# Patient Record
Sex: FEMALE | Race: WHITE | NOT HISPANIC OR LATINO | Employment: OTHER | URBAN - METROPOLITAN AREA
[De-identification: names, ages, dates, MRNs, and addresses within clinical notes are randomized per-mention and may not be internally consistent; named-entity substitution may affect disease eponyms.]

---

## 2017-01-04 DIAGNOSIS — R10.13 EPIGASTRIC PAIN: Primary | ICD-10-CM

## 2017-01-04 DIAGNOSIS — R10.9 FLANK PAIN: ICD-10-CM

## 2017-01-05 ENCOUNTER — TELEPHONE (OUTPATIENT)
Dept: FAMILY MEDICINE | Facility: CLINIC | Age: 62
End: 2017-01-05

## 2017-01-05 NOTE — TELEPHONE ENCOUNTER
Reason for Call:  Request for results:    Name of test or procedure:    CT    Date of test of procedure:  12/29/2016    Location of the test or procedure: [facility]    OK to leave the result message on voice mail or with a family member? YES    Phone number Patient can be reached at:  Home number on file [phone] (home)    Additional comments:    Please call patient    Call taken on 1/5/2017 at 11:36 AM by ANTONIO VALLADARES

## 2017-01-24 ENCOUNTER — OFFICE VISIT (OUTPATIENT)
Dept: FAMILY MEDICINE | Facility: CLINIC | Age: 62
End: 2017-01-24
Payer: COMMERCIAL

## 2017-01-24 ENCOUNTER — TRANSFERRED RECORDS (OUTPATIENT)
Dept: HEALTH INFORMATION MANAGEMENT | Facility: CLINIC | Age: 62
End: 2017-01-24

## 2017-01-24 VITALS
TEMPERATURE: 98.2 F | HEART RATE: 68 BPM | WEIGHT: 120 LBS | OXYGEN SATURATION: 99 % | HEIGHT: 68 IN | RESPIRATION RATE: 20 BRPM | BODY MASS INDEX: 18.19 KG/M2 | SYSTOLIC BLOOD PRESSURE: 126 MMHG | DIASTOLIC BLOOD PRESSURE: 66 MMHG

## 2017-01-24 DIAGNOSIS — N39.0 RECURRENT UTI: ICD-10-CM

## 2017-01-24 DIAGNOSIS — N39.41 URGENCY INCONTINENCE: Primary | ICD-10-CM

## 2017-01-24 LAB
ALBUMIN UR-MCNC: NEGATIVE MG/DL
APPEARANCE UR: CLEAR
BILIRUB UR QL STRIP: NEGATIVE
COLOR UR AUTO: YELLOW
GLUCOSE UR STRIP-MCNC: NEGATIVE MG/DL
HGB UR QL STRIP: NEGATIVE
KETONES UR STRIP-MCNC: NEGATIVE MG/DL
LEUKOCYTE ESTERASE UR QL STRIP: ABNORMAL
NITRATE UR QL: NEGATIVE
PH UR STRIP: 7 PH (ref 5–7)
RBC #/AREA URNS AUTO: NORMAL /HPF (ref 0–2)
SP GR UR STRIP: 1.01 (ref 1–1.03)
URN SPEC COLLECT METH UR: ABNORMAL
UROBILINOGEN UR STRIP-ACNC: 0.2 EU/DL (ref 0.2–1)
WBC #/AREA URNS AUTO: NORMAL /HPF (ref 0–2)

## 2017-01-24 PROCEDURE — 81001 URINALYSIS AUTO W/SCOPE: CPT | Performed by: INTERNAL MEDICINE

## 2017-01-24 PROCEDURE — 99213 OFFICE O/P EST LOW 20 MIN: CPT | Performed by: INTERNAL MEDICINE

## 2017-01-24 PROCEDURE — 87086 URINE CULTURE/COLONY COUNT: CPT | Performed by: INTERNAL MEDICINE

## 2017-01-24 NOTE — MR AVS SNAPSHOT
"              After Visit Summary   1/24/2017    Marylee Woods    MRN: [id number]           Patient Information     Date Of Birth          1955        Visit Information        Provider Department      1/24/2017 9:15 AM Kelechi Núñez MD [facility]        Today's Diagnoses     Urgency incontinence    -  1     Recurrent UTI            Follow-ups after your visit        Your next 10 appointments already scheduled     Jan 26, 2017   Procedure with Mayo Adkins MD, MD   [facility] ([facility])    [address] [place] [address] [phone]           [facility] is located at [address] [place]              Who to contact     If you have questions or need follow up information about today's clinic visit or your schedule please contact [facility] directly at [phone].  Normal or non-critical lab and imaging results will be communicated to you by MyChart, letter or phone within 4 business days after the clinic has received the results. If you do not hear from us within 7 days, please contact the clinic through MyChart or phone. If you have a critical or abnormal lab result, we will notify you by phone as soon as possible.  Submit refill requests through [email or web address] or call your pharmacy and they will forward the refill request to us. Please allow 3 business days for your refill to be completed.          Additional Information About Your Visit        Kizoomhart Information     [email or web address] lets you send messages to your doctor, view your test results, renew your prescriptions, schedule appointments and more. To sign up, go to [email or web address] . Click on \"Log in\" on the left side of the screen, which will take you to the Welcome page. Then click on \"Sign up Now\" on the right side of the page.     You will be asked to enter the access code listed below, as well as some personal " "information. Please follow the directions to create your username and password.     Your access code is: [email or web address]  Expires: 2017 10:45 AM     Your access code will  in 90 days. If you need help or a new code, please call your [facility] or [phone].        Care EveryWhere ID     This is your [facility] EveryWhere ID. This could be used by other organizations to access your [facility] medical records  [id number]        Your Vitals Were     Pulse Temperature Respirations    68 98.2  F (36.8  C) 20    Height BMI (Body Mass Index) Pulse Oximetry    5' 8\" (1.727 m) 18.25 kg/m2 99%    Last Period Breastfeeding?       (LMP Unknown) No        Blood Pressure from Last 3 Encounters:   17 126/66   16 100/64   16 120/74    Weight from Last 3 Encounters:   17 120 lb (54.432 kg)   16 120 lb (54.432 kg)   16 120 lb (54.432 kg)              We Performed the Following     UA reflex to Microscopic and Culture     Urine Culture Aerobic Bacterial     Urine Microscopic          Today's Medication Changes          These changes are accurate as of: 17 12:56 PM.  If you have any questions, ask your nurse or doctor.               These medicines have changed or have updated prescriptions.        Dose/Directions    sertraline 100 MG tablet   Commonly known as:  ZOLOFT   This may have changed:    - how much to take  - how to take this  - when to take this  - additional instructions   Used for:  Major depression in complete remission (H)        TAKE 1.5 TABLETS BY MOUTH DAILY   Quantity:  135 tablet   Refills:  1                Primary Care Provider Office Phone # Fax #    Carolina Pulliam [phone]       [address] [address]        Thank you!     Thank you for choosing [facility]  for your care. Our goal is always to provide you with excellent care. Hearing back from our patients is one way we " can continue to improve our services. Please take a few minutes to complete the written survey that you may receive in the mail after your visit with us. Thank you!             Your Updated Medication List - Protect others around you: Learn how to safely use, store and throw away your medicines at www.disposemymeds.org.          This list is accurate as of: 1/24/17 12:56 PM.  Always use your most recent med list.                   Brand Name Dispense Instructions for use    AUBAGIO 14 MG tablet   Generic drug:  teriflunomide          baclofen 10 MG tablet    LIORESAL     Take 10 mg by mouth 2 times daily       cholecalciferol 5000 UNITS Caps capsule    vitamin D3     Take 1 capsule by mouth daily.       gabapentin 300 MG capsule    NEURONTIN     Take 600 mg by mouth 2 times daily Pt takes 900mg at night       hydrochlorothiazide 12.5 MG Tabs tablet     90 tablet    TAKE 1 TABLET BY MOUTH DAILY       LORazepam 1 MG tablet    ATIVAN    30 tablet    Take 1 tablet (1 mg) by mouth At Bedtime       multivitamin, therapeutic with minerals Tabs tablet      Take 1 tablet by mouth daily.       NICOTROL 10 MG Inhaler   Generic drug:  nicotine          nitrofurantoin macrocrystal 100 MG capsule    MACRODANTIN     Take 100 mg by mouth daily       order for DME     1 Units    Equipment being ordered: medical lift chair       oxybutynin 10 MG 24 hr tablet    DITROPAN-XL     Take by mouth 2 times daily       sertraline 100 MG tablet    ZOLOFT    135 tablet    TAKE 1.5 TABLETS BY MOUTH DAILY       valACYclovir 500 MG tablet    VALTREX    12 tablet    Take 1 tablet (500 mg) by mouth 2 times daily

## 2017-01-24 NOTE — PROGRESS NOTES
SUBJECTIVE:                                                    Marylee Woods is a 61 year old female who presents to clinic today for the following health issues:      URINARY TRACT SYMPTOMS     Onset: couple days     Description:   Painful urination (Dysuria): no   Blood in urine (Hematuria): no   Delay in urine (Hesitancy): YES- occ.    Intensity: moderate    Progression of Symptoms:  same    Accompanying Signs & Symptoms:  Fever/chills: no   Flank pain YES- occ.  Nausea and vomiting: YES- nausea  Any vaginal symptoms: none  Abdominal/Pelvic Pain: YES- occ.   History:   History of frequent UTI's: YES  History of kidney stones: no   Sexually Active: no   Possibility of pregnancy: No    Precipitating factors:   nothing         Therapies Tried and outcome: nothing           This patient with multiple sclerosis, and urinary urge incontinence, with a history of recurrent UTI, has noted that her urine has a strong order and she was concerned about another infection. She is still taking Macrodantin 100 mg per day for prophylaxis.                She saw GI this morning, regarding her abnormal CT, and they're planning EGD and a colonoscopy.    Problem list and histories reviewed & adjusted, as indicated.      Current Outpatient Prescriptions   Medication Sig Dispense Refill     nitrofurantoin macrocrystal (MACRODANTIN) 100 MG capsule Take 100 mg by mouth daily  11     hydrochlorothiazide 12.5 MG TABS TAKE 1 TABLET BY MOUTH DAILY 90 tablet 2     valACYclovir (VALTREX) 500 MG tablet Take 1 tablet (500 mg) by mouth 2 times daily 12 tablet 5     sertraline (ZOLOFT) 100 MG tablet TAKE 1.5 TABLETS BY MOUTH DAILY (Patient taking differently: Take 100 mg by mouth daily TAKE 1.5 TABLETS BY MOUTH DAILY) 135 tablet 1     AUBAGIO 14 MG tablet   11     NICOTROL 10 MG inhaler   1     LORazepam (ATIVAN) 1 MG tablet Take 1 tablet (1 mg) by mouth At Bedtime 30 tablet 5     gabapentin (NEURONTIN) 300 MG capsule Take 600 mg by mouth 2  "times daily Pt takes 900mg at night       baclofen (LIORESAL) 10 MG tablet Take 10 mg by mouth 2 times daily        oxybutynin (DITROPAN-XL) 10 MG 24 hr tablet Take by mouth 2 times daily       ORDER FOR DME Equipment being ordered: medical lift chair 1 Units 0     cholecalciferol (VITAMIN D3) 5000 UNITS CAPS capsule Take 1 capsule by mouth daily.       multivitamin, therapeutic with minerals (THERA-VIT-M) TABS Take 1 tablet by mouth daily.       No Known Allergies  BP Readings from Last 3 Encounters:   01/24/17 126/66   12/28/16 100/64   12/12/16 120/74    Wt Readings from Last 3 Encounters:   01/24/17 120 lb (54.432 kg)   12/28/16 120 lb (54.432 kg)   12/12/16 120 lb (54.432 kg)                    ROS:  CONSTITUTIONAL:NEGATIVE  for chills and fever   : negative for and hematuria    OBJECTIVE:                                                    /66 mmHg  Pulse 68  Temp(Src) 98.2  F (36.8  C)  Resp 20  Ht 5' 8\" (1.727 m)  Wt 120 lb (54.432 kg)  BMI 18.25 kg/m2  SpO2 99%  LMP  (LMP Unknown)  Breastfeeding? No  Body mass index is 18.25 kg/(m^2).  GENERAL APPEARANCE: alert, no distress and in a wheelchair    Diagnostic test results:  Results for orders placed or performed in visit on 01/24/17 (from the past 24 hour(s))   UA reflex to Microscopic and Culture   Result Value Ref Range    Color Urine Yellow     Appearance Urine Clear     Glucose Urine Negative NEG mg/dL    Bilirubin Urine Negative NEG    Ketones Urine Negative NEG mg/dL    Specific Gravity Urine 1.010 1.003 - 1.035    Blood Urine Negative NEG    pH Urine 7.0 5.0 - 7.0 pH    Protein Albumin Urine Negative NEG mg/dL    Urobilinogen Urine 0.2 0.2 - 1.0 EU/dL    Nitrite Urine Negative NEG    Leukocyte Esterase Urine Small (A) NEG    Source Midstream Urine    Urine Microscopic   Result Value Ref Range    WBC Urine O - 2 0 - 2 /HPF    RBC Urine O - 2 0 - 2 /HPF        ASSESSMENT/PLAN:                                                        " ICD-10-CM    1. Urgency incontinence N39.41    2. Recurrent UTI N39.0        Urine looks unremarkable but we will culture it to be sure.         She will try to increase her fluid intake.  Follow up with Provider - lilo Núñez MD  [facility]

## 2017-01-24 NOTE — Clinical Note
[facility]  [address]  [address]  [place] [facility] [address] [phone]                                                                                                           Marylee Woods  [address] [place] [address]    January 25, 2017      Dear Marylee,    The results of your recent tests were reviewed and are enclosed.     Results for orders placed or performed in visit on 01/24/17   UA reflex to Microscopic and Culture   Result Value Ref Range    Color Urine Yellow     Appearance Urine Clear     Glucose Urine Negative NEG mg/dL    Bilirubin Urine Negative NEG    Ketones Urine Negative NEG mg/dL    Specific Gravity Urine 1.010 1.003 - 1.035    Blood Urine Negative NEG    pH Urine 7.0 5.0 - 7.0 pH    Protein Albumin Urine Negative NEG mg/dL    Urobilinogen Urine 0.2 0.2 - 1.0 EU/dL    Nitrite Urine Negative NEG    Leukocyte Esterase Urine Small (A) NEG    Source Midstream Urine    Urine Microscopic   Result Value Ref Range    WBC Urine O - 2 0 - 2 /HPF    RBC Urine O - 2 0 - 2 /HPF   Urine Culture Aerobic Bacterial   Result Value Ref Range    Specimen Description Midstream Urine     Special Requests Midstream Urine     Culture Micro No growth     Micro Report Status FINAL 01/25/2017      This is all negative for the urine culture.    Thank you for choosing [facility].  We appreciate the opportunity to serve you and look forward to supporting your healthcare needs in the future.    If you have any questions or concerns, please call me or my staff at [phone].      Sincerely,    Kelechi Núñez MD

## 2017-01-24 NOTE — NURSING NOTE
"Chief Complaint   Patient presents with     UTI       Initial /66 mmHg  Pulse 68  Temp(Src) 98.2  F (36.8  C)  Resp 20  Ht 5' 8\" (1.727 m)  Wt 120 lb (54.432 kg)  BMI 18.25 kg/m2  SpO2 99%  LMP  (LMP Unknown)  Breastfeeding? No Estimated body mass index is 18.25 kg/(m^2) as calculated from the following:    Height as of this encounter: 5' 8\" (1.727 m).    Weight as of this encounter: 120 lb (54.432 kg).  BP completed using cuff size: cris Marquez LPN  "

## 2017-01-25 LAB
BACTERIA SPEC CULT: NO GROWTH
Lab: NORMAL
MICRO REPORT STATUS: NORMAL
SPECIMEN SOURCE: NORMAL

## 2017-01-26 ENCOUNTER — HOSPITAL ENCOUNTER (OUTPATIENT)
Facility: CLINIC | Age: 62
Discharge: HOME HEALTH | End: 2017-01-26
Attending: INTERNAL MEDICINE | Admitting: INTERNAL MEDICINE
Payer: COMMERCIAL

## 2017-01-26 ENCOUNTER — TRANSFERRED RECORDS (OUTPATIENT)
Dept: HEALTH INFORMATION MANAGEMENT | Facility: CLINIC | Age: 62
End: 2017-01-26

## 2017-01-26 VITALS
OXYGEN SATURATION: 90 % | SYSTOLIC BLOOD PRESSURE: 141 MMHG | DIASTOLIC BLOOD PRESSURE: 88 MMHG | RESPIRATION RATE: 16 BRPM

## 2017-01-26 LAB
COLONOSCOPY: NORMAL
UPPER GI ENDOSCOPY: NORMAL

## 2017-01-26 PROCEDURE — 88305 TISSUE EXAM BY PATHOLOGIST: CPT | Performed by: INTERNAL MEDICINE

## 2017-01-26 PROCEDURE — 45380 COLONOSCOPY AND BIOPSY: CPT | Performed by: INTERNAL MEDICINE

## 2017-01-26 PROCEDURE — 99153 MOD SED SAME PHYS/QHP EA: CPT | Performed by: INTERNAL MEDICINE

## 2017-01-26 PROCEDURE — G0500 MOD SEDAT ENDO SERVICE >5YRS: HCPCS | Performed by: INTERNAL MEDICINE

## 2017-01-26 PROCEDURE — 25000132 ZZH RX MED GY IP 250 OP 250 PS 637: Performed by: INTERNAL MEDICINE

## 2017-01-26 PROCEDURE — 88305 TISSUE EXAM BY PATHOLOGIST: CPT | Mod: 26 | Performed by: INTERNAL MEDICINE

## 2017-01-26 PROCEDURE — 43239 EGD BIOPSY SINGLE/MULTIPLE: CPT | Performed by: INTERNAL MEDICINE

## 2017-01-26 PROCEDURE — 25000125 ZZHC RX 250: Performed by: INTERNAL MEDICINE

## 2017-01-26 RX ORDER — ONDANSETRON 2 MG/ML
4 INJECTION INTRAMUSCULAR; INTRAVENOUS EVERY 6 HOURS PRN
Status: DISCONTINUED | OUTPATIENT
Start: 2017-01-26 | End: 2017-01-26 | Stop reason: HOSPADM

## 2017-01-26 RX ORDER — ONDANSETRON 4 MG/1
4 TABLET, ORALLY DISINTEGRATING ORAL EVERY 6 HOURS PRN
Status: DISCONTINUED | OUTPATIENT
Start: 2017-01-26 | End: 2017-01-26 | Stop reason: HOSPADM

## 2017-01-26 RX ORDER — LIDOCAINE 40 MG/G
CREAM TOPICAL
Status: DISCONTINUED | OUTPATIENT
Start: 2017-01-26 | End: 2017-01-26 | Stop reason: HOSPADM

## 2017-01-26 RX ORDER — NALOXONE HYDROCHLORIDE 0.4 MG/ML
.1-.4 INJECTION, SOLUTION INTRAMUSCULAR; INTRAVENOUS; SUBCUTANEOUS
Status: DISCONTINUED | OUTPATIENT
Start: 2017-01-26 | End: 2017-01-26 | Stop reason: HOSPADM

## 2017-01-26 RX ORDER — ONDANSETRON 2 MG/ML
4 INJECTION INTRAMUSCULAR; INTRAVENOUS
Status: DISCONTINUED | OUTPATIENT
Start: 2017-01-26 | End: 2017-01-26 | Stop reason: HOSPADM

## 2017-01-26 RX ORDER — FENTANYL CITRATE 50 UG/ML
INJECTION, SOLUTION INTRAMUSCULAR; INTRAVENOUS PRN
Status: DISCONTINUED | OUTPATIENT
Start: 2017-01-26 | End: 2017-01-26 | Stop reason: HOSPADM

## 2017-01-26 RX ORDER — FLUMAZENIL 0.1 MG/ML
0.2 INJECTION, SOLUTION INTRAVENOUS
Status: DISCONTINUED | OUTPATIENT
Start: 2017-01-26 | End: 2017-01-26 | Stop reason: HOSPADM

## 2017-01-26 NOTE — H&P
Pre-Endoscopy History and Physical     Marylee Woods MRN# [id number]   YOB: 1955 Age: 61 year old     Date of Procedure: 1/26/2017  Primary care provider: Carolina Pulliam  Type of Endoscopy: colonoscopy and esophagogastroduodenoscopy (upper GI endoscopy)  Reason for Procedure: dysphagia, abdominal pain, abnormal ct scan  Type of Anesthesia Anticipated: Conscious Sedation    HPI:    Marylee is a 61 year old female who will be undergoing the above procedure.      A history and physical has been performed. The patient's medications and allergies have been reviewed. The risks and benefits of the procedure and the sedation options and risks were discussed with the patient.  All questions were answered and informed consent was obtained.      She denies a personal or family history of anesthesia complications or bleeding disorders.     Patient Active Problem List   Diagnosis     Thrombocytopenia (H)     Anemia     MS (multiple sclerosis) (H)     L tib fx s/p IM nailing     UTI prophylaxis     History of fracture of fibula     History of tibial fracture     HSV (herpes simplex virus) infection     Hyperlipidemia with target LDL less than 130     Major depression in complete remission (H)     Essential hypertension     Epigastric pain     ACP (advance care planning)     Essential hypertension with goal blood pressure less than 140/90     Screen for colon cancer        Past Medical History   Diagnosis Date     Multiple sclerosis (H)      Gastro-oesophageal reflux disease         Past Surgical History   Procedure Laterality Date     Hip surgery  2009     femur ortho surgery     C/section, low transverse  1992     Laparoscopic cholecystectomy  6/24/2014     Procedure: LAPAROSCOPIC CHOLECYSTECTOMY;  Surgeon: Randy Bailey MD;  [facility]     Colonoscopy  2007     Esophagoscopy, gastroscopy, duodenoscopy (egd), combined  1/26/2017     Dr. Adkins [facility]     Orthopedic surgery  2009     surgery right  upper femur fx near hip     Open reduction internal fixation rodding intramedullary tibia  4/14/2013     Procedure: OPEN REDUCTION INTERNAL FIXATION RODDING INTRAMEDULLARY TIBIA;;  Surgeon: Sajan Cast MD;  Location: [facility] OR       Relevant Family History: NONE    Relevant Social History: NONE     Prior to Admission medications    Medication Sig Start Date End Date Taking? Authorizing Provider   nitrofurantoin macrocrystal (MACRODANTIN) 100 MG capsule Take 100 mg by mouth daily 10/13/16  Yes Reported, Patient   hydrochlorothiazide 12.5 MG TABS TAKE 1 TABLET BY MOUTH DAILY 7/28/16  Yes Carolina Pulliam MD   sertraline (ZOLOFT) 100 MG tablet TAKE 1.5 TABLETS BY MOUTH DAILY  Patient taking differently: Take 100 mg by mouth daily TAKE 1.5 TABLETS BY MOUTH DAILY 6/27/16  Yes Carolina Pulliam MD   AUBAGIO 14 MG tablet  3/1/16  Yes Reported, Patient   NICOTROL 10 MG inhaler  11/3/14  Yes Reported, Patient   gabapentin (NEURONTIN) 300 MG capsule Take 600 mg by mouth 2 times daily Pt takes 900mg at night 6/17/14  Yes Malorie Bates MD   baclofen (LIORESAL) 10 MG tablet Take 10 mg by mouth 2 times daily  6/17/14  Yes Malorie Bates MD   oxybutynin (DITROPAN-XL) 10 MG 24 hr tablet Take by mouth 2 times daily 6/17/14  Yes Malorie Bates MD   ORDER FOR DME Equipment being ordered: medical lift chair 6/18/13  Yes Malorie Bates MD   cholecalciferol (VITAMIN D3) 5000 UNITS CAPS capsule Take 1 capsule by mouth daily. 4/30/13  Yes Randall Martinez MD   multivitamin, therapeutic with minerals (THERA-VIT-M) TABS Take 1 tablet by mouth daily. 12/15/11  Yes Ethel Miranda APRN CNP   valACYclovir (VALTREX) 500 MG tablet Take 1 tablet (500 mg) by mouth 2 times daily 7/12/16   Carolina Pulliam MD   LORazepam (ATIVAN) 1 MG tablet Take 1 tablet (1 mg) by mouth At Bedtime 12/18/14   Malorie Bates MD       No Known Allergies     REVIEW OF SYSTEMS:   A relevant review of systems was  "performed and was negative    PHYSICAL EXAM:   /80 mmHg  Resp 12  SpO2 99%  LMP  (LMP Unknown) Estimated body mass index is 18.25 kg/(m^2) as calculated from the following:    Height as of 1/24/17: 1.727 m (5' 8\").    Weight as of 1/24/17: 54.432 kg (120 lb).   GENERAL APPEARANCE: alert, and oriented  MENTAL STATUS: alert  AIRWAY EXAM: Normal  RESP: lungs clear to auscultation - no rales, rhonchi or wheezes  CV: regular rates and rhythm  DIAGNOSTICS:    Not indicated    IMPRESSION   ASA Class 2 - Mild systemic disease    PLAN:   Plan for EGD and colonoscopy. We discussed the risks, benefits and alternatives and the patient wished to proceed.      Signed Electronically by: Mayo Adkins  January 26, 2017              "

## 2017-01-27 LAB — COPATH REPORT: NORMAL

## 2017-03-17 ENCOUNTER — OFFICE VISIT (OUTPATIENT)
Dept: FAMILY MEDICINE | Facility: CLINIC | Age: 62
End: 2017-03-17
Payer: COMMERCIAL

## 2017-03-17 VITALS
DIASTOLIC BLOOD PRESSURE: 72 MMHG | OXYGEN SATURATION: 97 % | TEMPERATURE: 98.3 F | HEART RATE: 84 BPM | SYSTOLIC BLOOD PRESSURE: 130 MMHG

## 2017-03-17 DIAGNOSIS — I10 ESSENTIAL HYPERTENSION: ICD-10-CM

## 2017-03-17 DIAGNOSIS — M26.609 TEMPOROMANDIBULAR JOINT DISORDER: ICD-10-CM

## 2017-03-17 DIAGNOSIS — G35 MS (MULTIPLE SCLEROSIS) (H): ICD-10-CM

## 2017-03-17 DIAGNOSIS — R30.0 DYSURIA: Primary | ICD-10-CM

## 2017-03-17 DIAGNOSIS — F32.5 MAJOR DEPRESSION IN COMPLETE REMISSION (H): ICD-10-CM

## 2017-03-17 PROCEDURE — 99214 OFFICE O/P EST MOD 30 MIN: CPT | Performed by: FAMILY MEDICINE

## 2017-03-17 RX ORDER — CIPROFLOXACIN 250 MG/1
250 TABLET, FILM COATED ORAL 2 TIMES DAILY
Qty: 12 TABLET | Refills: 0 | Status: SHIPPED | OUTPATIENT
Start: 2017-03-17 | End: 2017-08-04

## 2017-03-17 NOTE — MR AVS SNAPSHOT
"              After Visit Summary   3/17/2017    Marylee Woods    MRN: [id number]           Patient Information     Date Of Birth          1955        Visit Information        Provider Department      3/17/2017 1:45 PM Carolina Pulliam MD [facility]        Today's Diagnoses     Dysuria    -  1    MS (multiple sclerosis) (H)        Major depression in complete remission (H)        Temporomandibular joint disorder        Essential hypertension           Follow-ups after your visit        Who to contact     If you have questions or need follow up information about today's clinic visit or your schedule please contact [facility] directly at [phone].  Normal or non-critical lab and imaging results will be communicated to you by MyChart, letter or phone within 4 business days after the clinic has received the results. If you do not hear from us within 7 days, please contact the clinic through MyChart or phone. If you have a critical or abnormal lab result, we will notify you by phone as soon as possible.  Submit refill requests through [email or web address] or call your pharmacy and they will forward the refill request to us. Please allow 3 business days for your refill to be completed.          Additional Information About Your Visit        MyChart Information     [email or web address] lets you send messages to your doctor, view your test results, renew your prescriptions, schedule appointments and more. To sign up, go to [email or web address] . Click on \"Log in\" on the left side of the screen, which will take you to the Welcome page. Then click on \"Sign up Now\" on the right side of the page.     You will be asked to enter the access code listed below, as well as some personal information. Please follow the directions to create your username and password.     Your access code is: [email or web address]  Expires: 2017 10:41 AM     Your access code will  in 90 days. " If you need help or a new code, please call your [place] clinic or [phone].        Care EveryWhere ID     This is your Care EveryWhere ID. This could be used by other organizations to access your [place] medical records  [id number]        Your Vitals Were     Pulse Temperature Last Period Pulse Oximetry          84 98.3  F (36.8  C) (Tympanic) (LMP Unknown) 97%         Blood Pressure from Last 3 Encounters:   03/17/17 130/72   01/26/17 141/88   01/24/17 126/66    Weight from Last 3 Encounters:   01/24/17 120 lb (54.4 kg)   12/28/16 120 lb (54.4 kg)   12/12/16 120 lb (54.4 kg)                 Today's Medication Changes          These changes are accurate as of: 3/17/17 11:59 PM.  If you have any questions, ask your nurse or doctor.               Start taking these medicines.        Dose/Directions    ciprofloxacin 250 MG tablet   Commonly known as:  CIPRO   Used for:  Dysuria   Started by:  Carolina Pulliam MD        Dose:  250 mg   Take 1 tablet (250 mg) by mouth 2 times daily   Quantity:  12 tablet   Refills:  0         These medicines have changed or have updated prescriptions.        Dose/Directions    sertraline 100 MG tablet   Commonly known as:  ZOLOFT   This may have changed:    - how much to take  - how to take this  - when to take this  - additional instructions   Used for:  Major depression in complete remission (H)        TAKE 1.5 TABLETS BY MOUTH DAILY   Quantity:  135 tablet   Refills:  1            Where to get your medicines      These medications were sent to [facility] [address] [place] [address] AT [address] [address]     Phone:  [phone]     ciprofloxacin 250 MG tablet                Primary Care Provider Office Phone # Fax #    Carolina Pulliam [phone]       [address] [address]        Thank you!     Thank you for choosing [facility]  for your care. Our goal is always to provide you with excellent care. Hearing back from our patients is one way we can continue to improve our services. Please take a few minutes to complete the written survey that you may receive in the mail after your visit with us. Thank you!             Your Updated Medication List - Protect others around you: Learn how to safely use, store and throw away your medicines at www.disposemymeds.org.          This list is accurate as of: 3/17/17 11:59 PM.  Always use your most recent med list.                   Brand Name Dispense Instructions for use    AUBAGIO 14 MG tablet   Generic drug:  teriflunomide          baclofen 10 MG tablet    LIORESAL     Take 10 mg by mouth 2 times daily       cholecalciferol 5000 UNITS Caps capsule    vitamin D3     Take 1 capsule by mouth daily.       ciprofloxacin 250 MG tablet    CIPRO    12 tablet    Take 1 tablet (250 mg) by mouth 2 times daily       FLUCONAZOLE PO          gabapentin 300 MG capsule    NEURONTIN     Take 600 mg by mouth 2 times daily Pt takes 900mg at night       hydrochlorothiazide 12.5 MG Tabs tablet     90 tablet    TAKE 1 TABLET BY MOUTH DAILY       LORazepam 1 MG tablet    ATIVAN    30 tablet    Take 1 tablet (1 mg) by mouth At Bedtime       multivitamin, therapeutic with minerals Tabs tablet      Take 1 tablet by mouth daily.       NICOTROL 10 MG Inhaler   Generic drug:  nicotine          nitrofurantoin macrocrystal 100 MG capsule    MACRODANTIN     Take 100 mg by mouth daily       order for DME     1 Units    Equipment being ordered: medical lift chair       oxybutynin 10 MG 24 hr tablet    DITROPAN-XL     Take by mouth 2 times daily       sertraline 100 MG tablet    ZOLOFT    135 tablet    TAKE 1.5 TABLETS BY MOUTH DAILY       valACYclovir 500 MG tablet    VALTREX    12 tablet    Take 1 tablet (500 mg) by mouth 2 times daily

## 2017-03-17 NOTE — PROGRESS NOTES
"  SUBJECTIVE:                                                    Marylee Woods is a 61 year old female who presents to clinic today for the following health issues:      URINARY TRACT SYMPTOMS      Duration: 1 week    Description  odor and burning    Intensity:  moderate    Accompanying signs and symptoms:  Fever/chills: YES  Flank pain YES  Nausea and vomiting: no   Vaginal symptoms: odor  Abdominal/Pelvic Pain: no     History  History of frequent UTI's: YES  History of kidney stones: no   Sexually Active: no   Possibility of pregnancy: No    Precipitating or alleviating factors: None    Therapies tried and outcome: none   Outcome:      61 year old with multiple sclerosis, major depression, thrombocytopenia wheelchair bound and followed by neurology, with a history of recurrent Urinary Tract Infection on daily prophylaxis with nitrofurantoin but also of having symptoms of Urinary Tract Infection without infection, here today with her  for possible Urinary Tract Infection with dysuria and \"swampy smell down there\".  Not sexually active currently.      Additionally notes pain in her left ear, right behind it.  Could we look?    Problem list and histories reviewed & adjusted, as indicated.  Additional history: as documented    BP Readings from Last 3 Encounters:   03/17/17 130/72   01/26/17 141/88   01/24/17 126/66    Wt Readings from Last 3 Encounters:   01/24/17 120 lb (54.4 kg)   12/28/16 120 lb (54.4 kg)   12/12/16 120 lb (54.4 kg)                    Reviewed and updated as needed this visit by clinical staff  Tobacco  Allergies  Med Hx  Surg Hx  Fam Hx  Soc Hx      Reviewed and updated as needed this visit by Provider         ROS:  Constitutional, HEENT, cardiovascular, pulmonary, gi and gu systems are negative, except as otherwise noted.    OBJECTIVE:                                                    /72  Pulse 84  Temp 98.3  F (36.8  C) (Tympanic)  LMP  (LMP Unknown)  SpO2 97%  There is no " height or weight on file to calculate BMI.   GENERAL: alert, no distress and wheelchair bound, dry mouth, very pleasant  NEURO: mentation intact and weakness and changes from MS  HENT: normal cephalic/atraumatic, ear canals and TM's normal, nose and mouth without ulcers or lesions, oropharynx clear, oral mucous membranes moist and tenderness to palpation over left TMJ joint  NECK: no adenopathy, no asymmetry, masses, or scars and thyroid normal to palpation       ASSESSMENT/PLAN:                                                        Marylee was seen today for uti.    Diagnoses and all orders for this visit:    Dysuria   Will bring in sample tomorrow; after 2 hours of trying unable to leave us a sample today, unfortunately.   If positive will start cipro; sent today for wait and see.   Also for future UTIs will send home with cup for Urinalysis, then can get us a sample at home and bring in rather than try to collect here as that's difficult for her.  -     Cancel: UA reflex to Microscopic and Culture  -     ciprofloxacin (CIPRO) 250 MG tablet; Take 1 tablet (250 mg) by mouth 2 times daily  -     UA reflex to Microscopic and Culture; Future    MS (multiple sclerosis) (H)   Followed by neurology, on aubagio.  Severely symptomatic, but not worsening.    Major depression in complete remission (H)   Stable for now, on zoloft daily.    Temporomandibular joint disorder   Left sided, discussed exercise, jaw rest, avoid chewing, massage and OTC pain txment including heat and cold     Essential hypertension   Continue hCTZ, she brought in records of home BP and in 120-140 over 60-80 range.  I don't want to add additional medications at this point as I worry about her getting too low.      Carolina Pulliam MD  [facility]

## 2017-03-18 DIAGNOSIS — R30.0 DYSURIA: ICD-10-CM

## 2017-03-18 LAB
ALBUMIN UR-MCNC: ABNORMAL MG/DL
APPEARANCE UR: ABNORMAL
BACTERIA #/AREA URNS HPF: ABNORMAL /HPF
BILIRUB UR QL STRIP: ABNORMAL
COLOR UR AUTO: YELLOW
GLUCOSE UR STRIP-MCNC: NEGATIVE MG/DL
HGB UR QL STRIP: ABNORMAL
KETONES UR STRIP-MCNC: 15 MG/DL
LEUKOCYTE ESTERASE UR QL STRIP: NEGATIVE
NITRATE UR QL: NEGATIVE
NON-SQ EPI CELLS #/AREA URNS LPF: ABNORMAL /LPF
PH UR STRIP: 5.5 PH (ref 5–7)
RBC #/AREA URNS AUTO: ABNORMAL /HPF (ref 0–2)
SP GR UR STRIP: 1.02 (ref 1–1.03)
URN SPEC COLLECT METH UR: ABNORMAL
UROBILINOGEN UR STRIP-ACNC: 1 EU/DL (ref 0.2–1)
WBC #/AREA URNS AUTO: ABNORMAL /HPF (ref 0–2)

## 2017-03-18 PROCEDURE — 81001 URINALYSIS AUTO W/SCOPE: CPT | Performed by: FAMILY MEDICINE

## 2017-03-20 ENCOUNTER — TELEPHONE (OUTPATIENT)
Dept: FAMILY MEDICINE | Facility: CLINIC | Age: 62
End: 2017-03-20

## 2017-03-20 NOTE — TELEPHONE ENCOUNTER
Reason for Call:  Request for results:    Name of test or procedure: UA    Date of test of procedure: 3/18/17    Location of the test or procedure: [facility]    OK to leave the result message on voice mail or with a family member? YES    Phone number Patient can be reached at:  Home number on file [phone] (home)    Additional comments: wants to know UA results    Call taken on 3/20/2017 at 10:13 AM by PANTERA SCHULTZ

## 2017-03-20 NOTE — TELEPHONE ENCOUNTER
Patient is calling for results of UA, have not been read yet. Wanting to know the results.  Trina De Jesus RN  03/20/17  12:42 PM

## 2017-03-21 NOTE — TELEPHONE ENCOUNTER
Call to patient without answer,left message that she needs to go ahead and take the antibiotic and have urine tested when done with them.Call back to triage with any questions.

## 2017-03-21 NOTE — TELEPHONE ENCOUNTER
Has blood in in; I'd recommend she go ahead and take antibiotic BUT should bring another urine sample in at end of antibiotics course to be sure the blood has cleared.  If not, we need to do further testing/looking into what's going on.    Could you let Marylee know?    Thank you!  Carolina Pulliam MD

## 2017-03-22 NOTE — PROGRESS NOTES
See phone encounter - treated and needs follow up to be sure hematuria clears; follow up ua in 2-4 weeks.  Carolina Pulliam MD

## 2017-04-13 DIAGNOSIS — F32.5 MAJOR DEPRESSION IN COMPLETE REMISSION (H): ICD-10-CM

## 2017-04-13 RX ORDER — SERTRALINE HYDROCHLORIDE 100 MG/1
TABLET, FILM COATED ORAL
Qty: 135 TABLET | Refills: 0 | Status: SHIPPED | OUTPATIENT
Start: 2017-04-13 | End: 2017-08-04

## 2017-04-13 NOTE — TELEPHONE ENCOUNTER
SERTRALINE 100MG TABLETS     Last Written Prescription Date: 06/27/16  Last Fill Quantity: 135, # refills: 1  Last Office Visit with [facility] primary care provider:  03/17/16        Last PHQ-9 score on record=   PHQ-9 SCORE 11/1/2016   Total Score -   Total Score 4

## 2017-04-13 NOTE — TELEPHONE ENCOUNTER
Medication is being filled for 1 time refill only due to:  :  For depression-PHQ9 due in May  Maria M Waters RN- Triage [employer]

## 2017-05-08 ENCOUNTER — TRANSFERRED RECORDS (OUTPATIENT)
Dept: HEALTH INFORMATION MANAGEMENT | Facility: CLINIC | Age: 62
End: 2017-05-08

## 2017-06-07 ENCOUNTER — TRANSFERRED RECORDS (OUTPATIENT)
Dept: HEALTH INFORMATION MANAGEMENT | Facility: CLINIC | Age: 62
End: 2017-06-07

## 2017-06-22 DIAGNOSIS — I10 ESSENTIAL HYPERTENSION, BENIGN: ICD-10-CM

## 2017-06-22 RX ORDER — HYDROCHLOROTHIAZIDE 12.5 MG/1
TABLET ORAL
Qty: 90 TABLET | Refills: 0 | Status: SHIPPED | OUTPATIENT
Start: 2017-06-22 | End: 2017-08-04

## 2017-06-22 NOTE — TELEPHONE ENCOUNTER
Hydrochlorothiazide 12.5 mg    Last Written Prescription Date: 7/28/16  Last Fill Quantity: 90, # refills: 2  Last Office Visit with [facility] prescribing provider: 3/17/17  Medication is being filled for 1 time refill only due to:  Patient needs labs Needs medication follow up and labs.         Potassium   Date Value Ref Range Status   05/17/2016 3.3 (L) 3.4 - 5.3 mmol/L Final     Creatinine   Date Value Ref Range Status   05/17/2016 0.74 0.52 - 1.04 mg/dL Final     BP Readings from Last 3 Encounters:   03/17/17 130/72   01/26/17 141/88   01/24/17 126/66

## 2017-07-09 ENCOUNTER — TRANSFERRED RECORDS (OUTPATIENT)
Dept: HEALTH INFORMATION MANAGEMENT | Facility: CLINIC | Age: 62
End: 2017-07-09

## 2017-07-24 DIAGNOSIS — R30.0 DYSURIA: Primary | ICD-10-CM

## 2017-07-27 ENCOUNTER — TELEPHONE (OUTPATIENT)
Dept: FAMILY MEDICINE | Facility: CLINIC | Age: 62
End: 2017-07-27

## 2017-07-27 DIAGNOSIS — R30.0 DYSURIA: Primary | ICD-10-CM

## 2017-07-27 NOTE — TELEPHONE ENCOUNTER
One future order placed. I cannot confirm that we will be able to treat her based upon its findings, and may require a visit if abnormal. She may leave a new sample and please process. Please notify her. Thanks! Nicole Joy Siegler, PA-C

## 2017-07-27 NOTE — TELEPHONE ENCOUNTER
Patient is calling stating that her  dropped off a UA sample on Monday. There was no corresponding order and there is not a urine sample found in lab.     Patient mentioned that Dr. Pulliam told her to just bring a sample in the next time she had symptoms. No standing order on file for a UA for this patient.     Routing to provider and her colleagues to see if someone would feel comfortable writing an standing order for a UA.     Trina De Jesus RN  07/27/17  4:07 PM      Patient requests a call back with the response from the provider if denied or ordered. Either way. Triage to call patient back.

## 2017-07-27 NOTE — TELEPHONE ENCOUNTER
Called patient to let her know there is an order in her chart if she wants to drop off another sample. She states she will discuss orders with Dr Pulliam at her appt next week

## 2017-07-27 NOTE — TELEPHONE ENCOUNTER
Reason for Call:  Request for results:    Name of test or procedure: UA    Date of test of procedure: 7-24-17    Location of the test or procedure: [facility]    OK to leave the result message on voice mail or with a family member? YES    Phone number Patient can be reached at:  Home number on file [phone] (home)    Additional comments: Pt dropped off speciman and has not heard results.     Call taken on 7/27/2017 at 11:47 AM by ERYN BRIDGES

## 2017-07-27 NOTE — TELEPHONE ENCOUNTER
Per chart review, no results or orders on file. Left voice message asking patient call triage back.

## 2017-08-01 DIAGNOSIS — R30.0 DYSURIA: ICD-10-CM

## 2017-08-01 LAB
ALBUMIN UR-MCNC: NEGATIVE MG/DL
APPEARANCE UR: CLEAR
BILIRUB UR QL STRIP: NEGATIVE
COLOR UR AUTO: YELLOW
GLUCOSE UR STRIP-MCNC: NEGATIVE MG/DL
HGB UR QL STRIP: NEGATIVE
KETONES UR STRIP-MCNC: NEGATIVE MG/DL
LEUKOCYTE ESTERASE UR QL STRIP: NEGATIVE
NITRATE UR QL: NEGATIVE
PH UR STRIP: 7.5 PH (ref 5–7)
SP GR UR STRIP: 1.01 (ref 1–1.03)
URN SPEC COLLECT METH UR: ABNORMAL
UROBILINOGEN UR STRIP-ACNC: 0.2 EU/DL (ref 0.2–1)

## 2017-08-01 PROCEDURE — 81003 URINALYSIS AUTO W/O SCOPE: CPT | Performed by: FAMILY MEDICINE

## 2017-08-01 NOTE — PROGRESS NOTES
No infection noted today - I called and let patient know.  Dr. Carolina Pulliam MD/[facility]    Home Phone      [phone]

## 2017-08-04 ENCOUNTER — OFFICE VISIT (OUTPATIENT)
Dept: FAMILY MEDICINE | Facility: CLINIC | Age: 62
End: 2017-08-04
Payer: COMMERCIAL

## 2017-08-04 VITALS
HEART RATE: 64 BPM | BODY MASS INDEX: 19.48 KG/M2 | DIASTOLIC BLOOD PRESSURE: 76 MMHG | TEMPERATURE: 97.5 F | HEIGHT: 68 IN | WEIGHT: 128.5 LBS | OXYGEN SATURATION: 99 % | SYSTOLIC BLOOD PRESSURE: 140 MMHG

## 2017-08-04 DIAGNOSIS — D69.6 THROMBOCYTOPENIA (H): ICD-10-CM

## 2017-08-04 DIAGNOSIS — N64.4 BREAST TENDERNESS IN FEMALE: ICD-10-CM

## 2017-08-04 DIAGNOSIS — Z00.00 ROUTINE GENERAL MEDICAL EXAMINATION AT A HEALTH CARE FACILITY: Primary | ICD-10-CM

## 2017-08-04 DIAGNOSIS — G35 MS (MULTIPLE SCLEROSIS) (H): ICD-10-CM

## 2017-08-04 DIAGNOSIS — F17.200 TOBACCO USE DISORDER: ICD-10-CM

## 2017-08-04 DIAGNOSIS — Z87.891 HISTORY OF TOBACCO USE: ICD-10-CM

## 2017-08-04 DIAGNOSIS — I10 ESSENTIAL HYPERTENSION, BENIGN: ICD-10-CM

## 2017-08-04 DIAGNOSIS — F32.5 MAJOR DEPRESSION IN COMPLETE REMISSION (H): ICD-10-CM

## 2017-08-04 LAB
ERYTHROCYTE [DISTWIDTH] IN BLOOD BY AUTOMATED COUNT: 13.8 % (ref 10–15)
HCT VFR BLD AUTO: 34.5 % (ref 35–47)
HGB BLD-MCNC: 11.2 G/DL (ref 11.7–15.7)
MCH RBC QN AUTO: 28.4 PG (ref 26.5–33)
MCHC RBC AUTO-ENTMCNC: 32.5 G/DL (ref 31.5–36.5)
MCV RBC AUTO: 87 FL (ref 78–100)
PLATELET # BLD AUTO: 97 10E9/L (ref 150–450)
RBC # BLD AUTO: 3.95 10E12/L (ref 3.8–5.2)
WBC # BLD AUTO: 3 10E9/L (ref 4–11)

## 2017-08-04 PROCEDURE — G0296 VISIT TO DETERM LDCT ELIG: HCPCS | Performed by: FAMILY MEDICINE

## 2017-08-04 PROCEDURE — 80061 LIPID PANEL: CPT | Performed by: FAMILY MEDICINE

## 2017-08-04 PROCEDURE — 80048 BASIC METABOLIC PNL TOTAL CA: CPT | Performed by: FAMILY MEDICINE

## 2017-08-04 PROCEDURE — 99396 PREV VISIT EST AGE 40-64: CPT | Performed by: FAMILY MEDICINE

## 2017-08-04 PROCEDURE — 85027 COMPLETE CBC AUTOMATED: CPT | Performed by: FAMILY MEDICINE

## 2017-08-04 PROCEDURE — 36415 COLL VENOUS BLD VENIPUNCTURE: CPT | Performed by: FAMILY MEDICINE

## 2017-08-04 RX ORDER — OMEPRAZOLE 40 MG/1
CAPSULE, DELAYED RELEASE ORAL
Refills: 3 | COMMUNITY
Start: 2017-06-23 | End: 2018-07-26

## 2017-08-04 RX ORDER — SERTRALINE HYDROCHLORIDE 100 MG/1
150 TABLET, FILM COATED ORAL DAILY
Qty: 135 TABLET | Refills: 3 | Status: ON HOLD | OUTPATIENT
Start: 2017-08-04 | End: 2017-11-06

## 2017-08-04 RX ORDER — HYDROCHLOROTHIAZIDE 12.5 MG/1
12.5 TABLET ORAL DAILY
Qty: 90 TABLET | Refills: 3 | Status: ON HOLD | OUTPATIENT
Start: 2017-08-04 | End: 2017-11-06

## 2017-08-04 ASSESSMENT — PATIENT HEALTH QUESTIONNAIRE - PHQ9
SUM OF ALL RESPONSES TO PHQ QUESTIONS 1-9: 7
SUM OF ALL RESPONSES TO PHQ QUESTIONS 1-9: 7
10. IF YOU CHECKED OFF ANY PROBLEMS, HOW DIFFICULT HAVE THESE PROBLEMS MADE IT FOR YOU TO DO YOUR WORK, TAKE CARE OF THINGS AT HOME, OR GET ALONG WITH OTHER PEOPLE: SOMEWHAT DIFFICULT

## 2017-08-04 NOTE — NURSING NOTE
"Chief Complaint   Patient presents with     Physical     Currently, fasting      Patient Request     Breast exam and blood work      Fatigue     Fall asleep a lot during the day      Derm Problem     Middle of chest - noticed a bump - moves - squeezed it once popped in the inside - sensitive to the touch      Breast Problem     under the left breast noticed a bump - snsitive to the touch - bilateral brast -grew - pain on bilateral brast      Toe Pain     Left foot big toe - painful - discomfort        Initial /76 (BP Location: Left arm, Patient Position: Chair, Cuff Size: Adult Regular)  Pulse 64  Temp 97.5  F (36.4  C) (Oral)  Ht 5' 8\" (1.727 m)  Wt 128 lb 8 oz (58.3 kg)  LMP  (LMP Unknown)  SpO2 99%  Breastfeeding? No  BMI 19.54 kg/m2 Estimated body mass index is 19.54 kg/(m^2) as calculated from the following:    Height as of this encounter: 5' 8\" (1.727 m).    Weight as of this encounter: 128 lb 8 oz (58.3 kg).  Medication Reconciliation: complete     Anna Nobles MA     "
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
## 2017-08-04 NOTE — LETTER
"      Marylee Woods  [address]  [facility] [address]        August 7, 2017          Dear ,    NORMAL GLUCOSE, RENAL AND BLOOD SALTS     HIGH TOTAL CHOLESTEROL   NORMAL TRIGLYCERIDES   NORMAL HDL OR \"GOOD\" CHOLESTEROL   HIGH LDL OR \"BAD\" CHOLESTEROL   HIGH VERY LOW DENSITY CHOLESTEROL   BORDERLINE  LOW WHITE BLOOD CELL COUNT   AND HEMOGLOBIN  AND HEMATOCRIT NOT SIGNIFICANTLY CHANGED FROM ONE YEAR AGO     Resulted Orders   Basic metabolic panel   Result Value Ref Range    Sodium 143 133 - 144 mmol/L    Potassium 4.9 3.4 - 5.3 mmol/L    Chloride 108 94 - 109 mmol/L    Carbon Dioxide 29 20 - 32 mmol/L    Anion Gap 6 3 - 14 mmol/L    Glucose 82 70 - 99 mg/dL      Comment:      Fasting specimen    Urea Nitrogen 13 7 - 30 mg/dL    Creatinine 0.79 0.52 - 1.04 mg/dL    GFR Estimate 74 >60 mL/min/1.7m2      Comment:      Non  GFR Calc    GFR Estimate If Black 89 >60 mL/min/1.7m2      Comment:       GFR Calc    Calcium 9.7 8.5 - 10.1 mg/dL   CBC with platelets   Result Value Ref Range    WBC 3.0 (L) 4.0 - 11.0 10e9/L    RBC Count 3.95 3.8 - 5.2 10e12/L    Hemoglobin 11.2 (L) 11.7 - 15.7 g/dL    Hematocrit 34.5 (L) 35.0 - 47.0 %    MCV 87 78 - 100 fl    MCH 28.4 26.5 - 33.0 pg    MCHC 32.5 31.5 - 36.5 g/dL    RDW 13.8 10.0 - 15.0 %    Platelet Count 97 (L) 150 - 450 10e9/L   Lipid panel reflex to direct LDL   Result Value Ref Range    Cholesterol 244 (H) <200 mg/dL      Comment:      Desirable:       <200 mg/dl    Triglycerides 82 <150 mg/dL      Comment:      Fasting specimen    HDL Cholesterol 70 >49 mg/dL    LDL Cholesterol Calculated 158 (H) <100 mg/dL      Comment:      Above desirable:  100-129 mg/dl   Borderline High:  130-159 mg/dL   High:             160-189 mg/dL   Very high:       >189 mg/dl      Non HDL Cholesterol 174 (H) <130 mg/dL      Comment:      Above Desirable:  130-159 mg/dl   Borderline high:  160-189 mg/dl   High:             190-219 mg/dl   Very high:   "     >219 mg/dl     If you have any questions or concerns, please call the clinic at the number listed above.       Sincerely,      Carolina Pulliam MD

## 2017-08-04 NOTE — PATIENT INSTRUCTIONS
You need a mammogram!  Please call to schedule this.    [facility]   Appointment line [phone]  or  [facility] Appointment line [phone]      Preventive Health Recommendations  Female Ages 50 - 64    Yearly exam: See your health care provider every year in order to  o Review health changes.   o Discuss preventive care.    o Review your medicines if your doctor has prescribed any.      Get a Pap test every three years (unless you have an abnormal result and your provider advises testing more often).    If you get Pap tests with HPV test, you only need to test every 5 years, unless you have an abnormal result.     You do not need a Pap test if your uterus was removed (hysterectomy) and you have not had cancer.    You should be tested each year for STDs (sexually transmitted diseases) if you're at risk.     Have a mammogram every 1 to 2 years.    Have a colonoscopy at age 50, or have a yearly FIT test (stool test). These exams screen for colon cancer.      Have a cholesterol test every 5 years, or more often if advised.    Have a diabetes test (fasting glucose) every three years. If you are at risk for diabetes, you should have this test more often.     If you are at risk for osteoporosis (brittle bone disease), think about having a bone density scan (DEXA).    Shots: Get a flu shot each year. Get a tetanus shot every 10 years.    Nutrition:     Eat at least 5 servings of fruits and vegetables each day.    Eat whole-grain bread, whole-wheat pasta and brown rice instead of white grains and rice.    Talk to your provider about Calcium and Vitamin D.     Lifestyle    Exercise at least 150 minutes a week (30 minutes a day, 5 days a week). This will help you control your weight and prevent disease.    Limit alcohol to one drink per day.    No smoking.     Wear sunscreen to prevent skin cancer.     See your dentist every six months for an exam and cleaning.    See your eye doctor  every 1 to 2 years.    Lung Cancer Screening   Frequently Asked Questions  If you are at high-risk for lung cancer, getting screened with low-dose computed tomography (LDCT) every year can help save your life. This handout offers answers to some of the most common questions about lung cancer screening. If you have other questions, please call [phone] ([phone]).     What is it?  Lung cancer screening uses special X-ray technology to create an image of your lung tissue. The exam is quick and easy and takes less than 10 seconds. We don t give you any medicine or use any needles. You can eat before and after the exam. You don t need to change your clothes as long as the clothing on your chest doesn t contain metal. But, you do need to be able to hold your breath for at least 6 seconds during the exam.    What is the goal of lung cancer screening?  The goal of lung cancer screening is to save lives. Many times, lung cancer is not found until a person starts having physical symptoms. Lung cancer screening can help detect lung cancer in the earliest stages when it may be easier to treat.    Who should be screened for lung cancer?  We suggest lung cancer screening for anyone who is at high-risk for lung cancer. You are in the high-risk group if you:      are between the ages of 55 and 79, and    have smoked at least 1 pack of cigarettes a day for 30 or more years, and    still smoke or have quit within the past 15 years.    However, if you have a new cough or shortness of breath, you should talk to your doctor before being screened.    Some national lung health advocacy groups also recommend screening for people ages 50 to 79 who have smoked an average of 1 pack of cigarettes a day for 20 years. They must also have at least 1 other risk factor for lung cancer, not including exposure to secondhand smoke. Other risk factors are having had cancer in the past, emphysema, pulmonary fibrosis, COPD, a family  history of lung cancer, or exposure to certain materials such as arsenic, asbestos, beryllium, cadmium, chromium, diesel fumes, nickel, radon or silica. Your care team can help you know if you have one of these risk factors.     Why does it matter if I have symptoms?  Certain symptoms can be a sign that you have a condition in your lungs that should be checked and treated by your doctor. These symptoms include fever, chest pain, a new or changing cough, shortness of breath that you have never felt before, coughing up blood or unexplained weight loss. Having any of these symptoms can greatly affect the results of lung cancer screening.       Should all smokers get an LDCT lung cancer screening exam?  It depends. Lung cancer screening is for a very specific group of men and women who have a history of heavy smoking over a long period of time (see  Who should be screened for lung cancer  above).  I am in the high-risk group, but have been diagnosed with cancer in the past. Is LDCT lung cancer screening right for me?  In some cases, you should not have LDCT lung screening, such as when your doctor is already following your cancer with CT scan studies. Your doctor will help you decide if LDCT lung screening is right for you.  Do I need to have a screening exam every year?  Yes. If you are in the high-risk group described earlier, you should get an LDCT lung cancer screening exam every year until you are 79, or are no longer willing or able to undergo screening and possible procedures to diagnose and treat lung cancer.  How effective is LDCT at preventing death from lung cancer?  Studies have shown that LDCT lung cancer screening can lower the risk of death from lung cancer by 20 percent in people who are at high-risk.  What are the risks?  There are some risks and limitations of LDCT lung cancer screening. We want to make sure you understand the risks and benefits, so please let us know if you have any questions. Your  doctor may want to talk with you more about these risks.    Radiation exposure: As with any exam that uses radiation, there is a very small increased risk of cancer. The amount of radiation in LDCT is small about the same amount a person would get from a mammogram. Your doctor orders the exam when he or she feels the potential benefits outweigh the risks.    False negatives: No test is perfect, including LDCT. It is possible that you may have a medical condition, including lung cancer, that is not found during your exam. This is called a false negative result.    False positives and more testing: LDCT very often finds something in the lung that could be cancer, but in fact is not. This is called a false positive result. False positive tests often cause anxiety. To make sure these findings are not cancer, you may need to have more tests. These tests will be done only if you give us permission. Sometimes patients need a treatment that can have side effects, such as a biopsy. For more information on false positives, see  What can I expect from the results?     Findings not related to lung cancer: Your LDCT exam also takes pictures of areas of your body next to your lungs. In a very small number of cases, the CT scan will show an abnormal finding in one of these areas, such as your kidneys, adrenal glands, liver or thyroid. This finding may not be serious, but you may need more tests. Your doctor can help you decide what other tests you may need, if any.  What can I expect from the results?  About 1 out of 4 LDCT exams will find something that may need more tests. Most of the time, these findings are lung nodules. Lung nodules are very small collections of tissue in the lung. These nodules are very common, and the vast majority more than 97 percent are not cancer (benign). Most are normal lymph nodes or small areas of scarring from past infections.  But, if a small lung nodule is found to be cancer, the cancer can be  cured more than 90 percent of the time. To know if the nodule is cancer, we may need to get more images before your next yearly screening exam. If the nodule has suspicious features (for example, it is large, has an odd shape or grows over time), we will refer you to a specialist for further testing.  Will my doctor also get the results?  Yes. Your doctor will get a copy of your results.  Is it okay to keep smoking now that there s a cancer screening exam?  No. Tobacco is one of the strongest cancer-causing agents. It causes not only lung cancer, but other cancers and cardiovascular (heart) diseases as well. The damage caused by smoking builds over time. This means that the longer you smoke, the higher your risk of disease. While it is never too late to quit, the sooner you quit, the better.  Where can I find help to quit smoking?  The best way to prevent lung cancer is to stop smoking. If you have already quit smoking, congratulations and keep it up! For help on quitting smoking, please call [employer] at 0-019-624-QEDV (5877) or the [employer] at 1-678.268.6773 to find local resources near you.  One-on-one health coaching:  If you d prefer to work individually with a health care provider on tobacco cessation, we offer:      Medication Therapy Management:  Our specially trained pharmacists work closely with you and your doctor to help you quit smoking.  Call [phone] or [phone] (toll free).     Can Do: Health coaching offered by [facility].  [email or web address]

## 2017-08-04 NOTE — PROGRESS NOTES
Lung Cancer Screening Shared Decision Making Visit     Marylee Woods is eligible for lung cancer screening on the basis of the information provided in my signed lung cancer screening order.     I have discussed with patient the risks and benefits of screening for lung cancer with low-dose CT.     The risks include:   radiation exposure    false positives     over-diagnosis    The benefit of early detection of lung cancer is contingent upon adherence to annual screening or more frequent follow up if indicated.     Furthermore, reaping the benefits of screening requires Marylee Woods to be willing and physically able to undergo diagnostic procedures, if indicated. Although no specific guide is available for determining severity of comorbidities, it is reasonable to withhold screening in patients who have greater mortality risk from other diseases.     We did discuss that the only way to prevent lung cancer is to not smoke. Smoking cessation assistance was offered.    I did offer risk estimation using a calculator such as this one:    ShouldIScreen      Answers for HPI/ROS submitted by the patient on 8/4/2017   Annual Exam:  Getting at least 3 servings of Calcium per day:: NO  Bi-annual eye exam:: NO  Dental care twice a year:: NO  Sleep apnea or symptoms of sleep apnea:: Daytime drowsiness, Sleep apnea  Diet:: Regular (no restrictions), Breakfast skipped  Frequency of exercise:: 2-3 days/week  Taking medications regularly:: Yes  Medication side effects:: Other  Additional concerns today:: YES  PHQ-2 Score: 2  Duration of exercise:: Less than 15 minutes  If you checked off any problems, how difficult have these problems made it for you to do your work, take care of things at home, or get along with other people?: Somewhat difficult  PHQ9 TOTAL SCORE: 7

## 2017-08-04 NOTE — MR AVS SNAPSHOT
After Visit Summary   8/4/2017    Marylee Woods    MRN: [id number]           Patient Information     Date Of Birth          1955        Visit Information        Provider Department      8/4/2017 10:40 AM Carolina Pulliam MD [facility]        Today's Diagnoses     Routine general medical examination at a health care facility    -  1    Tobacco use disorder        Essential hypertension        History of tobacco use        Breast tenderness in female          Care Instructions    You need a mammogram!  Please call to schedule this.    [facility]   Appointment line [phone]  or  [facility] Appointment line [phone]      Preventive Health Recommendations  Female Ages 50 - 64    Yearly exam: See your health care provider every year in order to  o Review health changes.   o Discuss preventive care.    o Review your medicines if your doctor has prescribed any.      Get a Pap test every three years (unless you have an abnormal result and your provider advises testing more often).    If you get Pap tests with HPV test, you only need to test every 5 years, unless you have an abnormal result.     You do not need a Pap test if your uterus was removed (hysterectomy) and you have not had cancer.    You should be tested each year for STDs (sexually transmitted diseases) if you're at risk.     Have a mammogram every 1 to 2 years.    Have a colonoscopy at age 50, or have a yearly FIT test (stool test). These exams screen for colon cancer.      Have a cholesterol test every 5 years, or more often if advised.    Have a diabetes test (fasting glucose) every three years. If you are at risk for diabetes, you should have this test more often.     If you are at risk for osteoporosis (brittle bone disease), think about having a bone density scan (DEXA).    Shots: Get a flu shot each year. Get a tetanus shot every 10 years.    Nutrition:      Eat at least 5 servings of fruits and vegetables each day.    Eat whole-grain bread, whole-wheat pasta and brown rice instead of white grains and rice.    Talk to your provider about Calcium and Vitamin D.     Lifestyle    Exercise at least 150 minutes a week (30 minutes a day, 5 days a week). This will help you control your weight and prevent disease.    Limit alcohol to one drink per day.    No smoking.     Wear sunscreen to prevent skin cancer.     See your dentist every six months for an exam and cleaning.    See your eye doctor every 1 to 2 years.    Lung Cancer Screening   Frequently Asked Questions  If you are at high-risk for lung cancer, getting screened with low-dose computed tomography (LDCT) every year can help save your life. This handout offers answers to some of the most common questions about lung cancer screening. If you have other questions, please call [phone] ([phone]).     What is it?  Lung cancer screening uses special X-ray technology to create an image of your lung tissue. The exam is quick and easy and takes less than 10 seconds. We don t give you any medicine or use any needles. You can eat before and after the exam. You don t need to change your clothes as long as the clothing on your chest doesn t contain metal. But, you do need to be able to hold your breath for at least 6 seconds during the exam.    What is the goal of lung cancer screening?  The goal of lung cancer screening is to save lives. Many times, lung cancer is not found until a person starts having physical symptoms. Lung cancer screening can help detect lung cancer in the earliest stages when it may be easier to treat.    Who should be screened for lung cancer?  We suggest lung cancer screening for anyone who is at high-risk for lung cancer. You are in the high-risk group if you:      are between the ages of 55 and 79, and    have smoked at least 1 pack of cigarettes a day for 30 or more years, and     still smoke or have quit within the past 15 years.    However, if you have a new cough or shortness of breath, you should talk to your doctor before being screened.    Some national lung health advocacy groups also recommend screening for people ages 50 to 79 who have smoked an average of 1 pack of cigarettes a day for 20 years. They must also have at least 1 other risk factor for lung cancer, not including exposure to secondhand smoke. Other risk factors are having had cancer in the past, emphysema, pulmonary fibrosis, COPD, a family history of lung cancer, or exposure to certain materials such as arsenic, asbestos, beryllium, cadmium, chromium, diesel fumes, nickel, radon or silica. Your care team can help you know if you have one of these risk factors.     Why does it matter if I have symptoms?  Certain symptoms can be a sign that you have a condition in your lungs that should be checked and treated by your doctor. These symptoms include fever, chest pain, a new or changing cough, shortness of breath that you have never felt before, coughing up blood or unexplained weight loss. Having any of these symptoms can greatly affect the results of lung cancer screening.       Should all smokers get an LDCT lung cancer screening exam?  It depends. Lung cancer screening is for a very specific group of men and women who have a history of heavy smoking over a long period of time (see  Who should be screened for lung cancer  above).  I am in the high-risk group, but have been diagnosed with cancer in the past. Is LDCT lung cancer screening right for me?  In some cases, you should not have LDCT lung screening, such as when your doctor is already following your cancer with CT scan studies. Your doctor will help you decide if LDCT lung screening is right for you.  Do I need to have a screening exam every year?  Yes. If you are in the high-risk group described earlier, you should get an LDCT lung cancer screening exam every year  until you are 79, or are no longer willing or able to undergo screening and possible procedures to diagnose and treat lung cancer.  How effective is LDCT at preventing death from lung cancer?  Studies have shown that LDCT lung cancer screening can lower the risk of death from lung cancer by 20 percent in people who are at high-risk.  What are the risks?  There are some risks and limitations of LDCT lung cancer screening. We want to make sure you understand the risks and benefits, so please let us know if you have any questions. Your doctor may want to talk with you more about these risks.    Radiation exposure: As with any exam that uses radiation, there is a very small increased risk of cancer. The amount of radiation in LDCT is small--about the same amount a person would get from a mammogram. Your doctor orders the exam when he or she feels the potential benefits outweigh the risks.    False negatives: No test is perfect, including LDCT. It is possible that you may have a medical condition, including lung cancer, that is not found during your exam. This is called a false negative result.    False positives and more testing: LDCT very often finds something in the lung that could be cancer, but in fact is not. This is called a false positive result. False positive tests often cause anxiety. To make sure these findings are not cancer, you may need to have more tests. These tests will be done only if you give us permission. Sometimes patients need a treatment that can have side effects, such as a biopsy. For more information on false positives, see  What can I expect from the results?     Findings not related to lung cancer: Your LDCT exam also takes pictures of areas of your body next to your lungs. In a very small number of cases, the CT scan will show an abnormal finding in one of these areas, such as your kidneys, adrenal glands, liver or thyroid. This finding may not be serious, but you may need more tests. Your  doctor can help you decide what other tests you may need, if any.  What can I expect from the results?  About 1 out of 4 LDCT exams will find something that may need more tests. Most of the time, these findings are lung nodules. Lung nodules are very small collections of tissue in the lung. These nodules are very common, and the vast majority--more than 97 percent--are not cancer (benign). Most are normal lymph nodes or small areas of scarring from past infections.  But, if a small lung nodule is found to be cancer, the cancer can be cured more than 90 percent of the time. To know if the nodule is cancer, we may need to get more images before your next yearly screening exam. If the nodule has suspicious features (for example, it is large, has an odd shape or grows over time), we will refer you to a specialist for further testing.  Will my doctor also get the results?  Yes. Your doctor will get a copy of your results.  Is it okay to keep smoking now that there s a cancer screening exam?  No. Tobacco is one of the strongest cancer-causing agents. It causes not only lung cancer, but other cancers and cardiovascular (heart) diseases as well. The damage caused by smoking builds over time. This means that the longer you smoke, the higher your risk of disease. While it is never too late to quit, the sooner you quit, the better.  Where can I find help to quit smoking?  The best way to prevent lung cancer is to stop smoking. If you have already quit smoking, congratulations and keep it up! For help on quitting smoking, please call [facility] at 9-452-848-FMHR ([phone]) or the American Cancer Society at 1-386.233.2370 to find local resources near you.  One-on-one health coaching:  If you d prefer to work individually with a health care provider on tobacco cessation, we offer:      Medication Therapy Management:  Our specially trained pharmacists work closely with you and your doctor to help you quit smoking.  Call [phone] or  "[phone] (toll free).     Can Do: Health coaching offered by [facility].  [email or web address]            Follow-ups after your visit        Future tests that were ordered for you today     Open Future Orders        Priority Expected Expires Ordered    US Breast Left Complete 4 Quadrants Routine  8/5/2018 8/4/2017    MA Diagnostic Digital Bilateral Routine  8/4/2018 8/4/2017    US Breast Right Complete 4 Quadrants Routine  8/5/2018 8/4/2017    CT Chest Lung Cancer Scrn Low Dose wo Routine  8/4/2018 8/4/2017            Who to contact     If you have questions or need follow up information about today's clinic visit or your schedule please contact [facility] directly at [phone].  Normal or non-critical lab and imaging results will be communicated to you by MyChart, letter or phone within 4 business days after the clinic has received the results. If you do not hear from us within 7 days, please contact the clinic through MyChart or phone. If you have a critical or abnormal lab result, we will notify you by phone as soon as possible.  Submit refill requests through [employer] or call your pharmacy and they will forward the refill request to us. Please allow 3 business days for your refill to be completed.          Additional Information About Your Visit        [employer] Information     [employer] lets you send messages to your doctor, view your test results, renew your prescriptions, schedule appointments and more. To sign up, go to [email or web address] . Click on \"Log in\" on the left side of the screen, which will take you to the Welcome page. Then click on \"Sign up Now\" on the right side of the page.     You will be asked to enter the access code listed below, as well as some personal information. Please follow the directions to create your username and password.     Your access code is: [email or web address]  Expires: 11/2/2017 11:58 AM     Your access code will " " in 90 days. If you need help or a new code, please call your [facility] or [phone].        Care EveryWhere ID     This is your Care EveryWhere ID. This could be used by other organizations to access your [facility] medical records  [id number]        Your Vitals Were     Pulse Temperature Height Last Period Pulse Oximetry Breastfeeding?    64 97.5  F (36.4  C) (Oral) 5' 8\" (1.727 m) (LMP Unknown) 99% No    BMI (Body Mass Index)                   19.54 kg/m2            Blood Pressure from Last 3 Encounters:   17 140/76   17 130/72   17 141/88    Weight from Last 3 Encounters:   17 128 lb 8 oz (58.3 kg)   17 120 lb (54.4 kg)   16 120 lb (54.4 kg)              We Performed the Following     Basic metabolic panel     CBC with platelets     Lipid panel reflex to direct LDL     Jayla for Smoking Cessation Study (PLUTO) to Contact Patient     Prof fee: Shared Decisionmaking for Lung Cancer Screening     TOBACCO CESSATION ORDER FOR         Primary Care Provider Office Phone # Fax #    Carolina Pulliam [phone]       [address] [address]        Equal Access to Services     BRIE PALOMINO AH: Hadii gabriela ku hadasho Soomaali, waaxda luqadaha, qaybta kaalmada adeegyada, waxay idiin pam watkins. So [facility] [phone].    ATENCIÓN: Si habla español, tiene a de oliveira disposición servicios gratuitos de asistencia lingüística. Llame al [phone].    We comply with applicable federal civil rights laws and [place] laws. We do not discriminate on the basis of race, color, national origin, age, disability sex, sexual orientation or gender identity.            Thank you!     Thank you for choosing [facility]  for your care. Our goal is always to provide you with excellent care. Hearing back from our patients is one way we can continue to improve our services. Please take a few " minutes to complete the written survey that you may receive in the mail after your visit with us. Thank you!             Your Updated Medication List - Protect others around you: Learn how to safely use, store and throw away your medicines at www.disposemymeds.org.          This list is accurate as of: 8/4/17 11:58 AM.  Always use your most recent med list.                   Brand Name Dispense Instructions for use Diagnosis    AUBAGIO 14 MG tablet   Generic drug:  teriflunomide           baclofen 10 MG tablet    LIORESAL     Take 10 mg by mouth 2 times daily    Preop general physical exam       cholecalciferol 5000 UNITS Caps capsule    vitamin D3     Take 1 capsule by mouth daily.        ciprofloxacin 250 MG tablet    CIPRO    12 tablet    Take 1 tablet (250 mg) by mouth 2 times daily    Dysuria       FLUCONAZOLE PO           gabapentin 300 MG capsule    NEURONTIN     Take 600 mg by mouth 2 times daily Pt takes 900mg at night    Preop general physical exam       hydrochlorothiazide 12.5 MG Tabs tablet     90 tablet    TAKE 1 TABLET BY MOUTH DAILY    Essential hypertension, benign       LORazepam 1 MG tablet    ATIVAN    30 tablet    Take 1 tablet (1 mg) by mouth At Bedtime    Insomnia       multivitamin, therapeutic with minerals Tabs tablet      Take 1 tablet by mouth daily.    Anemia       NICOTROL 10 MG Inhaler   Generic drug:  nicotine           nitroFURantoin macrocrystal 100 MG capsule    MACRODANTIN     Take 100 mg by mouth daily        omeprazole 40 MG capsule    priLOSEC     TK 1 C PO ONCE QD AC        order for DME     1 Units    Equipment being ordered: medical lift chair    MS (multiple sclerosis) (H)       oxybutynin 10 MG 24 hr tablet    DITROPAN-XL     Take by mouth 2 times daily    Preop general physical exam       sertraline 100 MG tablet    ZOLOFT    135 tablet    TAKE 1 AND 1/2 TABLETS BY MOUTH DAILY    Major depression in complete remission (H)       valACYclovir 500 MG tablet    VALTREX    12  tablet    Take 1 tablet (500 mg) by mouth 2 times daily    Herpes simplex disease

## 2017-08-05 LAB
ANION GAP SERPL CALCULATED.3IONS-SCNC: 6 MMOL/L (ref 3–14)
BUN SERPL-MCNC: 13 MG/DL (ref 7–30)
CALCIUM SERPL-MCNC: 9.7 MG/DL (ref 8.5–10.1)
CHLORIDE SERPL-SCNC: 108 MMOL/L (ref 94–109)
CHOLEST SERPL-MCNC: 244 MG/DL
CO2 SERPL-SCNC: 29 MMOL/L (ref 20–32)
CREAT SERPL-MCNC: 0.79 MG/DL (ref 0.52–1.04)
GFR SERPL CREATININE-BSD FRML MDRD: 74 ML/MIN/1.7M2
GLUCOSE SERPL-MCNC: 82 MG/DL (ref 70–99)
HDLC SERPL-MCNC: 70 MG/DL
LDLC SERPL CALC-MCNC: 158 MG/DL
NONHDLC SERPL-MCNC: 174 MG/DL
POTASSIUM SERPL-SCNC: 4.9 MMOL/L (ref 3.4–5.3)
SODIUM SERPL-SCNC: 143 MMOL/L (ref 133–144)
TRIGL SERPL-MCNC: 82 MG/DL

## 2017-08-05 ASSESSMENT — PATIENT HEALTH QUESTIONNAIRE - PHQ9: SUM OF ALL RESPONSES TO PHQ QUESTIONS 1-9: 7

## 2017-08-05 NOTE — PROGRESS NOTES
"Please send normal lab letter when labs are complete  NORMAL GLUCOSE, RENAL AND BLOOD SALTS    HIGH TOTAL CHOLESTEROL   NORMAL TRIGLYCERIDES   NORMAL HDL OR \"GOOD\" CHOLESTEROL   HIGH LDL OR \"BAD\" CHOLESTEROL   HIGH VERY LOW DENSITY CHOLESTEROL   BORDERLINE  LOW WHITE BLOOD CELL COUNT   AND HEMOGLOBIN  AND HEMATOCRIT NOT SIGNIFICANTLY CHANGED FROM ONE YEAR AGO   The 10-year ASCVD risk score (Ama CASTANO Jr, et al., 2013) is: 9.5%    Values used to calculate the score:      Age: 62 years      Sex: Female      Is Non- : No      Diabetic: No      Tobacco smoker: Yes      Systolic Blood Pressure: 140 mmHg      Is BP treated: No      HDL Cholesterol: 70 mg/dL      Total Cholesterol: 244 mg/dL  Current Outpatient Prescriptions:  omeprazole (PRILOSEC) 40 MG capsule  sertraline (ZOLOFT) 100 MG tablet  hydrochlorothiazide 12.5 MG TABS tablet  FLUCONAZOLE PO  nitrofurantoin macrocrystal (MACRODANTIN) 100 MG capsule  valACYclovir (VALTREX) 500 MG tablet  AUBAGIO 14 MG tablet  NICOTROL 10 MG inhaler  LORazepam (ATIVAN) 1 MG tablet  gabapentin (NEURONTIN) 300 MG capsule  baclofen (LIORESAL) 10 MG tablet  oxybutynin (DITROPAN-XL) 10 MG 24 hr tablet  ORDER FOR DME  cholecalciferol (VITAMIN D3) 5000 UNITS CAPS capsule  multivitamin, therapeutic with minerals (THERA-VIT-M) TABS    ASPIRIN AND STATIN MEDICATION AS WELL AS MEDITERRANEAN DIET   MIGHT LOWER YOUR RISK UP TO 70%  PLEASE CONTACT AMILCAR CUEVAS MD    ABOUT THIS   HUONG RODRIGUEZ JR., MD     "

## 2017-08-14 DIAGNOSIS — R30.0 DYSURIA: ICD-10-CM

## 2017-08-14 LAB
ALBUMIN UR-MCNC: NEGATIVE MG/DL
APPEARANCE UR: CLEAR
BILIRUB UR QL STRIP: NEGATIVE
COLOR UR AUTO: YELLOW
GLUCOSE UR STRIP-MCNC: NEGATIVE MG/DL
HGB UR QL STRIP: NEGATIVE
KETONES UR STRIP-MCNC: NEGATIVE MG/DL
LEUKOCYTE ESTERASE UR QL STRIP: NEGATIVE
NITRATE UR QL: NEGATIVE
PH UR STRIP: 7 PH (ref 5–7)
SP GR UR STRIP: 1.01 (ref 1–1.03)
URN SPEC COLLECT METH UR: NORMAL
UROBILINOGEN UR STRIP-ACNC: 0.2 EU/DL (ref 0.2–1)

## 2017-08-14 PROCEDURE — 81003 URINALYSIS AUTO W/O SCOPE: CPT | Performed by: FAMILY MEDICINE

## 2017-08-14 NOTE — LETTER
Marylee Woods  [address]  [facility] [address]    August 16, 2017      Dear ,    We are writing to inform you of your test results.    Your test results fall within the expected range(s) or remain unchanged from previous results.  Please continue with current treatment plan.    Resulted Orders   **UA reflex to Microscopic FUTURE anytime   Result Value Ref Range    Color Urine Yellow     Appearance Urine Clear     Glucose Urine Negative NEG mg/dL    Bilirubin Urine Negative NEG    Ketones Urine Negative NEG mg/dL    Specific Gravity Urine 1.015 1.003 - 1.035    Blood Urine Negative NEG    pH Urine 7.0 5.0 - 7.0 pH    Protein Albumin Urine Negative NEG mg/dL    Urobilinogen Urine 0.2 0.2 - 1.0 EU/dL    Nitrite Urine Negative NEG    Leukocyte Esterase Urine Negative NEG    Source Midstream Urine      If you have any questions or concerns, please call the clinic at the number listed above.       Sincerely,      Carolina Pulliam MD

## 2017-08-16 ENCOUNTER — TRANSFERRED RECORDS (OUTPATIENT)
Dept: HEALTH INFORMATION MANAGEMENT | Facility: CLINIC | Age: 62
End: 2017-08-16

## 2017-08-16 NOTE — PROGRESS NOTES
Could you call Marylee with negative Urinalysis results?  No Urinary Tract Infection.  Thanks,  Dr. Carolina Pulliam MD/[facility]

## 2017-08-20 DIAGNOSIS — G35 MS (MULTIPLE SCLEROSIS) (H): Primary | ICD-10-CM

## 2017-08-20 RX ORDER — DIPHENHYDRAMINE HCL 25 MG
25 CAPSULE ORAL EVERY 4 HOURS PRN
Status: CANCELLED
Start: 2017-08-25

## 2017-08-20 RX ORDER — EPINEPHRINE 0.3 MG/.3ML
0.3 INJECTION SUBCUTANEOUS
Status: CANCELLED | OUTPATIENT
Start: 2017-08-25

## 2017-08-20 RX ORDER — HYDROXYZINE HYDROCHLORIDE 50 MG/1
50 TABLET, FILM COATED ORAL
Status: CANCELLED
Start: 2017-08-25

## 2017-08-20 RX ORDER — HYDROXYZINE HYDROCHLORIDE 50 MG/1
50 TABLET, FILM COATED ORAL ONCE
Status: CANCELLED
Start: 2017-08-25 | End: 2017-08-25

## 2017-08-20 RX ORDER — MEPERIDINE HYDROCHLORIDE 50 MG/1
50 TABLET ORAL EVERY 4 HOURS PRN
Status: CANCELLED
Start: 2017-08-25

## 2017-08-20 RX ORDER — DIPHENHYDRAMINE HYDROCHLORIDE 50 MG/ML
25 INJECTION INTRAMUSCULAR; INTRAVENOUS
Status: CANCELLED | OUTPATIENT
Start: 2017-08-25

## 2017-08-20 RX ORDER — MEPERIDINE HYDROCHLORIDE 50 MG/1
50 TABLET ORAL ONCE
Status: CANCELLED
Start: 2017-08-25 | End: 2017-08-25

## 2017-08-20 RX ORDER — IBUPROFEN 200 MG
800 TABLET ORAL EVERY 6 HOURS PRN
Status: CANCELLED | OUTPATIENT
Start: 2017-08-25

## 2017-08-20 RX ORDER — ACETAMINOPHEN 500 MG
1000 TABLET ORAL EVERY 4 HOURS PRN
Status: CANCELLED | OUTPATIENT
Start: 2017-08-25

## 2017-08-20 RX ORDER — PROMETHAZINE HYDROCHLORIDE 25 MG/ML
25 INJECTION, SOLUTION INTRAMUSCULAR; INTRAVENOUS
Status: CANCELLED
Start: 2017-08-25

## 2017-08-20 RX ORDER — ACETAMINOPHEN 500 MG
1000 TABLET ORAL ONCE
Status: CANCELLED | OUTPATIENT
Start: 2017-08-25

## 2017-08-22 ENCOUNTER — HOSPITAL ENCOUNTER (OUTPATIENT)
Dept: MAMMOGRAPHY | Facility: CLINIC | Age: 62
Discharge: HOME | End: 2017-08-22
Attending: FAMILY MEDICINE | Admitting: FAMILY MEDICINE
Payer: COMMERCIAL

## 2017-08-22 ENCOUNTER — HOSPITAL ENCOUNTER (OUTPATIENT)
Dept: MAMMOGRAPHY | Facility: CLINIC | Age: 62
End: 2017-08-22
Attending: FAMILY MEDICINE
Payer: COMMERCIAL

## 2017-08-22 ENCOUNTER — HOSPITAL ENCOUNTER (OUTPATIENT)
Dept: CT IMAGING | Facility: CLINIC | Age: 62
End: 2017-08-22
Attending: FAMILY MEDICINE
Payer: COMMERCIAL

## 2017-08-22 DIAGNOSIS — N64.4 BREAST TENDERNESS IN FEMALE: ICD-10-CM

## 2017-08-22 DIAGNOSIS — Z00.00 ROUTINE GENERAL MEDICAL EXAMINATION AT A HEALTH CARE FACILITY: ICD-10-CM

## 2017-08-22 DIAGNOSIS — Z87.891 HISTORY OF TOBACCO USE: ICD-10-CM

## 2017-08-22 DIAGNOSIS — F17.200 TOBACCO USE DISORDER: ICD-10-CM

## 2017-08-22 PROCEDURE — G0204 DX MAMMO INCL CAD BI: HCPCS

## 2017-08-22 PROCEDURE — 76642 ULTRASOUND BREAST LIMITED: CPT | Mod: LT

## 2017-08-22 PROCEDURE — G0297 LDCT FOR LUNG CA SCREEN: HCPCS

## 2017-08-22 NOTE — LETTER
August 23, 2017      Marylee Woods  [address]  [place] [facility] [address]        Dear Marylee,     It was nice to see you recently!  I wanted to let you know your recent test results - details of the results can be found below. Briefly:     1. Your mammogram results look great; everything is normal.     Let me know if you have any questions about this, or other concerns.     Best,   Carolina Pulliam MD   Family Medicine   [facility]   [phone]         Results for orders placed or performed during the hospital encounter of 08/22/17   -US Breast Left                                                                                                                    Narrative   DIAGNOSTIC MAMMOGRAM BILATERAL, DIGITAL w/CAD AND TOMOSYNTHESIS,   ULTRASOUND LEFT BREAST, 8/22/2017 11:08 AM.     HISTORY: ,  Encounter for general adult medical examination without   abnormal findings, Mastodynia     BREAST DENSITY: Heterogeneously dense.     FINDINGS:                        Diagnostic mammogram: Negative. Stable exam                          Diagnostic ultrasound: evaluation of the area of   concern shows no worrisome findings.  Clinical followup recommended.                                                                                                                        Impression   IMPRESSION: BI-RADS CATEGORY: 1 -  Negative.     RECOMMENDED FOLLOW-UP: Annual Mammography.         HOLLY STEPHENSON MD

## 2017-08-23 ENCOUNTER — TELEPHONE (OUTPATIENT)
Dept: FAMILY MEDICINE | Facility: CLINIC | Age: 62
End: 2017-08-23

## 2017-08-23 DIAGNOSIS — R91.8 LUNG NODULES: ICD-10-CM

## 2017-08-23 NOTE — TELEPHONE ENCOUNTER
[facility] Incidental Finding result notification:     Exam date: 8/22/17  Exam: LDCT scan   Radiologist recommendations:  ACR Assessment Category:  Lung-RADS Category 3. Probably benign finding(s)- short term follow up suggested 6 month low dose CT .   Ordering Provider: Carolina Pulliam MD  Marylee Woods did not receive the remaining radiology results from her provider.   RN ordered a follow up CT to be completed in 6 months (Yes/No):  Yes   RN communicated the lung nodule finding to the patient (Yes/No):  Left message at 1:50P  RN ordered Lung nodule program referral (Yes/NA): No  The patient had the following questions: NA  RN sent correct letter as per Lung nodule protocol (Yes/No):  Sent by imaging department.  Miscellaneous information:  NA  RN has transferred the patient to scheduling team to schedule follow up (Yes/No): Left message requesting call back    Image Scheduling Team (LDCT's only): Hours available (all sites below):  Mon-Fri 7A to 8P; Sat 7A to 3P.  No schedulers available on Sunday.    [facility] ([facility]): [phone]    North region ([place], [place]): [phone]    South region ([facility]): [phone]    Complex Imaging Schedulers: [phone] [Scheduling both CT and Lung nodule clinic visit on the same day, CT scheduled first]  Complex imaging scheduling hours available: Mon, Tues, Thurs, Fri 7A - 7:30P;  Wed, 7A-6:45P   Left voicemail message requesting a call back to [phone] between 10 a.m. and 6:30 p.m. to discuss radiology finding.    [employer] RN  Lung Nodule and ED Lab Result F/u RN  Ph# [phone]

## 2017-08-23 NOTE — PROGRESS NOTES
Hi Team 3:  Please sent a lab result with the following note.  Thank you!    Dear Marylee,    It was nice to see you recently!  I wanted to let you know your recent test results - details of the results can be found below. Briefly:    1. Your mammogram results look great; everything is normal.    Let me know if you have any questions about this, or other concerns.    Best,  Carolina Pulliam MD  Family Medicine  [facility]  [phone]         Results for orders placed or performed during the hospital encounter of 08/22/17  -US Breast Left      Narrative    DIAGNOSTIC MAMMOGRAM BILATERAL, DIGITAL w/CAD AND TOMOSYNTHESIS,    ULTRASOUND LEFT BREAST, 8/22/2017 11:08 AM.        HISTORY: ,  Encounter for general adult medical examination without    abnormal findings, Mastodynia         BREAST DENSITY: Heterogeneously dense.        FINDINGS:                           Diagnostic mammogram: Negative. Stable exam                               Diagnostic ultrasound: evaluation of the area of    concern shows no worrisome findings.  Clinical followup recommended.               Impression    IMPRESSION: BI-RADS CATEGORY: 1 -  Negative.        RECOMMENDED FOLLOW-UP: Annual Mammography.                HOLLY STEPHENSON MD

## 2017-08-28 NOTE — TELEPHONE ENCOUNTER
Called back discussed some results, pt had several questions this RN answered what she was able to, pt did not have an abundance of time and has more questions verbalized intent to call back tomorrow and would like to talk to Mohan WOLF RN and will schedule follow up at that time.     Candy Moyer, RN  [facility] RN  Lung Nodule and ED Lab Result F/u RN  Epic pool (ED late result f/u RN): [id number]  [facility] INCIDENTAL RADIOLOGY F/U NURSES: [id number]  # [phone]

## 2017-08-29 NOTE — TELEPHONE ENCOUNTER
RN returned Marylee's call.  She is not able to talk at this time.  RN will call back around 4P.    Mohan Burdick RN  [facility] RN  Lung Nodule and ED Lab Result F/u RN  Epic pool (ED late result f/u RN): [id number]  [facility] INCIDENTAL RADIOLOGY F/U NURSES: P [id number]  # [phone]

## 2017-08-29 NOTE — TELEPHONE ENCOUNTER
Marylee returned call.  RN reviewed information about lung nodules and the Radiologist recommendation.  CT chest order placed and she will get a call from imaging 1 month prior to schedule.  Encouraged to call back if any further questions.    Mohan Burdick RN  [facility] RN  Lung Nodule and ED Lab Result F/u RN  Epic pool (ED late result f/u RN): [id number]  FV INCIDENTAL RADIOLOGY F/U NURSES: P [id number]  # [phone]

## 2017-08-31 ENCOUNTER — OFFICE VISIT (OUTPATIENT)
Dept: FAMILY MEDICINE | Facility: CLINIC | Age: 62
End: 2017-08-31
Payer: COMMERCIAL

## 2017-08-31 VITALS
OXYGEN SATURATION: 98 % | HEART RATE: 68 BPM | BODY MASS INDEX: 19.61 KG/M2 | TEMPERATURE: 98.2 F | WEIGHT: 129 LBS | SYSTOLIC BLOOD PRESSURE: 110 MMHG | RESPIRATION RATE: 16 BRPM | DIASTOLIC BLOOD PRESSURE: 66 MMHG

## 2017-08-31 DIAGNOSIS — L60.0 INGROWN TOENAIL: ICD-10-CM

## 2017-08-31 DIAGNOSIS — E04.1 THYROID NODULE: Primary | ICD-10-CM

## 2017-08-31 DIAGNOSIS — Z79.899 DRUG THERAPY: ICD-10-CM

## 2017-08-31 DIAGNOSIS — G35 MULTIPLE SCLEROSIS (H): ICD-10-CM

## 2017-08-31 LAB
BASOPHILS # BLD AUTO: 0 10E9/L (ref 0–0.2)
BASOPHILS NFR BLD AUTO: 1.1 %
DIFFERENTIAL METHOD BLD: ABNORMAL
EOSINOPHIL # BLD AUTO: 0.2 10E9/L (ref 0–0.7)
EOSINOPHIL NFR BLD AUTO: 6.5 %
ERYTHROCYTE [DISTWIDTH] IN BLOOD BY AUTOMATED COUNT: 14 % (ref 10–15)
HCT VFR BLD AUTO: 33.5 % (ref 35–47)
HGB BLD-MCNC: 10.8 G/DL (ref 11.7–15.7)
LYMPHOCYTES # BLD AUTO: 0.7 10E9/L (ref 0.8–5.3)
LYMPHOCYTES NFR BLD AUTO: 25.4 %
MCH RBC QN AUTO: 28.3 PG (ref 26.5–33)
MCHC RBC AUTO-ENTMCNC: 32.2 G/DL (ref 31.5–36.5)
MCV RBC AUTO: 88 FL (ref 78–100)
MONOCYTES # BLD AUTO: 0.2 10E9/L (ref 0–1.3)
MONOCYTES NFR BLD AUTO: 6.8 %
NEUTROPHILS # BLD AUTO: 1.7 10E9/L (ref 1.6–8.3)
NEUTROPHILS NFR BLD AUTO: 60.2 %
PLATELET # BLD AUTO: 111 10E9/L (ref 150–450)
RBC # BLD AUTO: 3.82 10E12/L (ref 3.8–5.2)
WBC # BLD AUTO: 2.8 10E9/L (ref 4–11)

## 2017-08-31 PROCEDURE — 86359 T CELLS TOTAL COUNT: CPT

## 2017-08-31 PROCEDURE — 87389 HIV-1 AG W/HIV-1&-2 AB AG IA: CPT

## 2017-08-31 PROCEDURE — 86787 VARICELLA-ZOSTER ANTIBODY: CPT

## 2017-08-31 PROCEDURE — 86355 B CELLS TOTAL COUNT: CPT

## 2017-08-31 PROCEDURE — 86357 NK CELLS TOTAL COUNT: CPT

## 2017-08-31 PROCEDURE — 99214 OFFICE O/P EST MOD 30 MIN: CPT | Performed by: FAMILY MEDICINE

## 2017-08-31 PROCEDURE — 86803 HEPATITIS C AB TEST: CPT

## 2017-08-31 PROCEDURE — 86360 T CELL ABSOLUTE COUNT/RATIO: CPT

## 2017-08-31 PROCEDURE — 36415 COLL VENOUS BLD VENIPUNCTURE: CPT

## 2017-08-31 PROCEDURE — 85025 COMPLETE CBC W/AUTO DIFF WBC: CPT

## 2017-08-31 PROCEDURE — 84443 ASSAY THYROID STIM HORMONE: CPT

## 2017-08-31 PROCEDURE — 86480 TB TEST CELL IMMUN MEASURE: CPT

## 2017-08-31 ASSESSMENT — PATIENT HEALTH QUESTIONNAIRE - PHQ9
10. IF YOU CHECKED OFF ANY PROBLEMS, HOW DIFFICULT HAVE THESE PROBLEMS MADE IT FOR YOU TO DO YOUR WORK, TAKE CARE OF THINGS AT HOME, OR GET ALONG WITH OTHER PEOPLE: SOMEWHAT DIFFICULT
SUM OF ALL RESPONSES TO PHQ QUESTIONS 1-9: 4
SUM OF ALL RESPONSES TO PHQ QUESTIONS 1-9: 4

## 2017-08-31 NOTE — LETTER
My Depression Action Plan  Name: Marylee Woods   Date of Birth 1955  Date: 8/31/2017    My doctor: Carolina Pulliam   My clinic: [address]  [address]  [facility] [address] [phone]          GREEN    ZONE   Good Control    What it looks like:     Things are going generally well. You have normal up s and down s. You may even feel depressed from time to time, but bad moods usually last less than a day.   What you need to do:  1. Continue to care for yourself (see self care plan)  2. Check your depression survival kit and update it as needed  3. Follow your physician s recommendations including any medication.  4. Do not stop taking medication unless you consult with your physician first.           YELLOW         ZONE Getting Worse    What it looks like:     Depression is starting to interfere with your life.     It may be hard to get out of bed; you may be starting to isolate yourself from others.    Symptoms of depression are starting to last most all day and this has happened for several days.     You may have suicidal thoughts but they are not constant.   What you need to do:     1. Call your care team, your response to treatment will improve if you keep your care team informed of your progress. Yellow periods are signs an adjustment may need to be made.     2. Continue your self-care, even if you have to fake it!    3. Talk to someone in your support network    4. Open up your depression survival kit           RED    ZONE Medical Alert - Get Help    What it looks like:     Depression is seriously interfering with your life.     You may experience these or other symptoms: You can t get out of bed most days, can t work or engage in other necessary activities, you have trouble taking care of basic hygiene, or basic responsibilities, thoughts of suicide or death that will not go away, self-injurious behavior.     What you need to  do:  1. Call your care team and request a same-day appointment. If they are not available (weekends or after hours) call your local crisis line, emergency room or 911.      Electronically signed by: Britta Byrne, August 31, 2017    Depression Self Care Plan / Survival Kit    Self-Care for Depression  Here s the deal. Your body and mind are really not as separate as most people think.  What you do and think affects how you feel and how you feel influences what you do and think. This means if you do things that people who feel good do, it will help you feel better.  Sometimes this is all it takes.  There is also a place for medication and therapy depending on how severe your depression is, so be sure to consult with your medical provider and/ or Behavioral Health Consultant if your symptoms are worsening or not improving.     In order to better manage my stress, I will:    Exercise  Get some form of exercise, every day. This will help reduce pain and release endorphins, the  feel good  chemicals in your brain. This is almost as good as taking antidepressants!  This is not the same as joining a gym and then never going! (they count on that by the way ) It can be as simple as just going for a walk or doing some gardening, anything that will get you moving.      Hygiene   Maintain good hygiene (Get out of bed in the morning, Make your bed, Brush your teeth, Take a shower, and Get dressed like you were going to work, even if you are unemployed).  If your clothes don't fit try to get ones that do.    Diet  I will strive to eat foods that are good for me, drink plenty of water, and avoid excessive sugar, caffeine, alcohol, and other mood-altering substances.  Some foods that are helpful in depression are: complex carbohydrates, B vitamins, flaxseed, fish or fish oil, fresh fruits and vegetables.    Psychotherapy  I agree to participate in Individual Therapy (if recommended).    Medication  If prescribed medications, I  agree to take them.  Missing doses can result in serious side effects.  I understand that drinking alcohol, or other illicit drug use, may cause potential side effects.  I will not stop my medication abruptly without first discussing it with my provider.    Staying Connected With Others  I will stay in touch with my friends, family members, and my primary care provider/team.    Use your imagination  Be creative.  We all have a creative side; it doesn t matter if it s oil painting, sand castles, or mud pies! This will also kick up the endorphins.    Witness Beauty  (AKA stop and smell the roses) Take a look outside, even in mid-winter. Notice colors, textures. Watch the squirrels and birds.     Service to others  Be of service to others.  There is always someone else in need.  By helping others we can  get out of ourselves  and remember the really important things.  This also provides opportunities for practicing all the other parts of the program.    Humor  Laugh and be silly!  Adjust your TV habits for less news and crime-drama and more comedy.    Control your stress  Try breathing deep, massage therapy, biofeedback, and meditation. Find time to relax each day.     My support system    Clinic Contact:  Phone number:    Contact 1:  Phone number:    Contact 2:  Phone number:    Confucianism/:  Phone number:    Therapist:  Phone number:    Local crisis center:    Phone number:    Other community support:  Phone number:

## 2017-08-31 NOTE — PATIENT INSTRUCTIONS
1) Follow up on thyroid ultrasound.  For Norma call [phone].      2) We'll fax your HPV and lab test results to Dr. Redman.

## 2017-08-31 NOTE — NURSING NOTE
"Chief Complaint   Patient presents with     RECHECK       Initial /66  Pulse 68  Temp 98.2  F (36.8  C)  Resp 16  Wt 129 lb (58.5 kg)  LMP  (LMP Unknown)  SpO2 98%  BMI 19.61 kg/m2 Estimated body mass index is 19.61 kg/(m^2) as calculated from the following:    Height as of 8/4/17: 5' 8\" (1.727 m).    Weight as of this encounter: 129 lb (58.5 kg).  Medication Reconciliation: complete   LOVE Byrne CMA      "

## 2017-08-31 NOTE — PROGRESS NOTES
SUBJECTIVE:   Marylee Woods is a 62 year old female who presents to clinic today for the following health issues:      Follow up      Duration:     Description (location/character/radiation): pt here to follow up from MS vallejo and to have labs done    Intensity:  moderate    Accompanying signs and symptoms: none    History (similar episodes/previous evaluation): None    Precipitating or alleviating factors: None    Therapies tried and outcome: None       62 year old well known to me with MS, wheelchair bound, here today for a few concerns:    1) Neurologist Dr. Redman is going to start her on new MS med and stop the Aubagio - new one is called Lemtrada.  It's an injection you take for 5 days, then not again for a year.  However before she starts it needs multiple lab tests and also needs HPV  Records sent to Dr. Redman (negative last year).    2) Had lung CT scan - would like to discuss results.  They showed calcified thyroid nodule possibly - wanted to know if that's what could be making her hoarse?  Would like to evaluate further.  Last TSH normal.  TSH   Date Value Ref Range Status   05/17/2016 0.54 0.40 - 4.00 mU/L Final       Problem list and histories reviewed & adjusted, as indicated.  Additional history: as documented    BP Readings from Last 3 Encounters:   08/31/17 110/66   08/04/17 140/76   03/17/17 130/72    Wt Readings from Last 3 Encounters:   08/31/17 129 lb (58.5 kg)   08/04/17 128 lb 8 oz (58.3 kg)   01/24/17 120 lb (54.4 kg)                      Reviewed and updated as needed this visit by clinical staffTobacco  Allergies  Med Hx  Surg Hx  Fam Hx  Soc Hx      Reviewed and updated as needed this visit by Provider         ROS:  Constitutional, HEENT, cardiovascular, pulmonary, gi and gu systems are negative, except as otherwise noted.      OBJECTIVE:   /66  Pulse 68  Temp 98.2  F (36.8  C)  Resp 16  Wt 129 lb (58.5 kg)  LMP  (LMP Unknown)  SpO2 98%  BMI 19.61 kg/m2  Body mass  index is 19.61 kg/(m^2).  GENERAL: wheelchair bound, alert and in NAD.  Voice is slightly hoarse.  Dry mouth.  NECK: no adenopathy, no asymmetry, masses, or scars and thyroid normal to palpation  RESP: lungs clear to auscultation - no rales, rhonchi or wheezes  CV: regular rate and rhythm, normal S1 S2, no S3 or S4, no murmur, click or rub, no peripheral edema and peripheral pulses strong  ABDOMEN: soft, nontender, no hepatosplenomegaly, no masses and bowel sounds normal  MS: no gross musculoskeletal defects noted, no edema.  AFOs on.    ASSESSMENT/PLAN:       Marylee was seen today for recheck.    Diagnoses and all orders for this visit:    Thyroid nodule  -     US Thyroid; Future    Ingrown toenail  -     PODIATRY/FOOT & ANKLE SURGERY REFERRAL    Drug therapy  -     CBC with platelets and differential  -     *UA reflex to Microscopic and Culture ([place] [facility] (except [place] and [place])  -     TSH with free T4 reflex  -     **Hepatitis C Screen Reflex to RNA FUTURE anytime  -     **HIV Antigen Antibody Combo FUTURE anytime  -     T cell subset extended profile  -     Varicella Zoster Virus Antibody IgG  -     M Tuberculosis by Quantiferon    Multiple sclerosis (H)  -     CBC with platelets and differential  -     *UA reflex to Microscopic and Culture ([place] [facility] (except [place] and [place])  -     TSH with free T4 reflex  -     **Hepatitis C Screen Reflex to RNA FUTURE anytime  -     **HIV Antigen Antibody Combo FUTURE anytime  -     T cell subset extended profile  -     Varicella Zoster Virus Antibody IgG  -     M Tuberculosis by Quantiferon    Other orders  -     DEPRESSION ACTION PLAN (DAP)        Patient Instructions   1) Follow up on thyroid ultrasound.  For Bishnuisaura call [phone].      2) We'll fax your HPV and lab test results to Dr. Redman.            Carolina Pulliam MD  [facility]

## 2017-08-31 NOTE — MR AVS SNAPSHOT
After Visit Summary   8/31/2017    Marylee Woods    MRN: [id number]           Patient Information     Date Of Birth          1955        Visit Information        Provider Department      8/31/2017 10:20 AM Carolina Pulliam MD [facility]        Today's Diagnoses     Thyroid nodule    -  1    Ingrown toenail          Care Instructions    1) Follow up on thyroid ultrasound.  For Norma call [phone].      2) We'll fax your HPV and lab test results to Dr. Redman.                Follow-ups after your visit        Additional Services     PODIATRY/FOOT & ANKLE SURGERY REFERRAL       Your provider has referred you to: [facility]: [facility] [phone]   [email or web address]    Please be aware that coverage of these services is subject to the terms and limitations of your health insurance plan.  Call member services at your health plan with any benefit or coverage questions.      Please bring the following to your appointment:  >>   Any x-rays, CTs or MRIs which have been performed.  Contact the facility where they were done to arrange for  prior to your scheduled appointment.    >>   List of current medications   >>   This referral request   >>   Any documents/labs given to you for this referral                  Your next 10 appointments already scheduled     Sep 25, 2017  7:30 AM CDT   Level 7 with  INFUSION CHAIR 14   [facility] ([facility])    [facility]  [address]  [facility] [address]   [phone]            Sep 26, 2017  7:30 AM CDT   Level 7 with  INFUSION CHAIR 17   [facility] ([facility])    [facility]  [address]  [place] [address]   [phone]            Sep 27, 2017  8:00 AM CDT   Level 7 with  INFUSION CHAIR 10  "  [facility] ([facility])    [facility] [place]  [address]  [place] [address]   [phone]            Sep 28, 2017  8:00 AM CDT   Level 7 with SH INFUSION CHAIR 19   [facility] ([facility])    [facility] [place]  [address]  [place] [address]   [phone]            Sep 29, 2017  8:00 AM CDT   Level 7 with SH INFUSION CHAIR 17   [facility] ([facility])    [facility] [place]  [address]  [place] [address]   [phone]              Future tests that were ordered for you today     Open Future Orders        Priority Expected Expires Ordered    US Thyroid Routine  8/31/2018 8/31/2017            Who to contact     If you have questions or need follow up information about today's clinic visit or your schedule please contact [facility] directly at [phone].  Normal or non-critical lab and imaging results will be communicated to you by Savvifyhart, letter or phone within 4 business days after the clinic has received the results. If you do not hear from us within 7 days, please contact the clinic through Savvifyhart or phone. If you have a critical or abnormal lab result, we will notify you by phone as soon as possible.  Submit refill requests through StyleQ or call your pharmacy and they will forward the refill request to us. Please allow 3 business days for your refill to be completed.          Additional Information About Your Visit        Savvifyhart Information     StyleQ lets you send messages to your doctor, view your test results, renew your prescriptions, schedule appointments and more. To sign up, go to [email or web address] . Click on \"Log in\" on the left side of the screen, which will take you to the Welcome page. Then click on \"Sign up Now\" " on the right side of the page.     You will be asked to enter the access code listed below, as well as some personal information. Please follow the directions to create your username and password.     Your access code is: [email or web address]  Expires: 2017 11:58 AM     Your access code will  in 90 days. If you need help or a new code, please call your [place] clinic or [phone].        Care EveryWhere ID     This is your Care EveryWhere ID. This could be used by other organizations to access your [place] medical records  [id number]        Your Vitals Were     Pulse Temperature Respirations Last Period Pulse Oximetry BMI (Body Mass Index)    68 98.2  F (36.8  C) 16 (LMP Unknown) 98% 19.61 kg/m2       Blood Pressure from Last 3 Encounters:   17 110/66   17 140/76   17 130/72    Weight from Last 3 Encounters:   17 129 lb (58.5 kg)   17 128 lb 8 oz (58.3 kg)   17 120 lb (54.4 kg)              We Performed the Following     DEPRESSION ACTION PLAN (DAP)     PODIATRY/FOOT & ANKLE SURGERY REFERRAL        Primary Care Provider Office Phone # Fax #    Carolina Pulliam [phone] [address] [facility] [address]        Equal Access to Services     BRIE PALOMINO : Hadii gabriela ku hadasho Soanmol, waaxda luqadaha, qaybta kaalmada adewilmerda, kaylen watkins. So [facility] [phone].    ATENCIÓN: Si habla español, tiene a de oliveira disposición servicios gratuitos de asistencia lingüística. Lldee al [phone].    We comply with applicable federal civil rights laws and [place] laws. We do not discriminate on the basis of race, color, national origin, age, disability sex, sexual orientation or gender identity.            Thank you!     Thank you for choosing [facility]  for your care. Our goal is always to provide you with excellent care. Hearing back from our patients is one way we can continue to improve  our services. Please take a few minutes to complete the written survey that you may receive in the mail after your visit with us. Thank you!             Your Updated Medication List - Protect others around you: Learn how to safely use, store and throw away your medicines at www.disposemymeds.org.          This list is accurate as of: 8/31/17 10:53 AM.  Always use your most recent med list.                   Brand Name Dispense Instructions for use Diagnosis    AUBAGIO 14 MG tablet   Generic drug:  teriflunomide           baclofen 10 MG tablet    LIORESAL     Take 10 mg by mouth 2 times daily    Preop general physical exam       cholecalciferol 5000 UNITS Caps capsule    vitamin D3     Take 1 capsule by mouth daily.        FLUCONAZOLE PO           gabapentin 300 MG capsule    NEURONTIN     Take 600 mg by mouth 2 times daily Pt takes 900mg at night    Preop general physical exam       hydrochlorothiazide 12.5 MG Tabs tablet     90 tablet    Take 1 tablet (12.5 mg) by mouth daily    Essential hypertension, benign       LORazepam 1 MG tablet    ATIVAN    30 tablet    Take 1 tablet (1 mg) by mouth At Bedtime    Insomnia       multivitamin, therapeutic with minerals Tabs tablet      Take 1 tablet by mouth daily.    Anemia       NICOTROL 10 MG Inhaler   Generic drug:  nicotine           nitroFURantoin macrocrystal 100 MG capsule    MACRODANTIN     Take 100 mg by mouth daily        omeprazole 40 MG capsule    priLOSEC     TK 1 C PO ONCE QD AC        order for DME     1 Units    Equipment being ordered: medical lift chair    MS (multiple sclerosis) (H)       oxybutynin 10 MG 24 hr tablet    DITROPAN-XL     Take by mouth 2 times daily    Preop general physical exam       sertraline 100 MG tablet    ZOLOFT    135 tablet    Take 1.5 tablets (150 mg) by mouth daily    Major depression in complete remission (H)       valACYclovir 500 MG tablet    VALTREX    12 tablet    Take 1 tablet (500 mg) by mouth 2 times daily    Herpes  simplex disease

## 2017-09-01 ENCOUNTER — TRANSFERRED RECORDS (OUTPATIENT)
Dept: HEALTH INFORMATION MANAGEMENT | Facility: CLINIC | Age: 62
End: 2017-09-01

## 2017-09-01 LAB
CD19 CELLS # BLD: 46 CELLS/UL (ref 107–698)
CD19 CELLS NFR BLD: 6 % (ref 6–27)
CD3 CELLS # BLD: 611 CELLS/UL (ref 603–2990)
CD3 CELLS NFR BLD: 84 % (ref 49–84)
CD3+CD4+ CELLS # BLD: 365 CELLS/UL (ref 441–2156)
CD3+CD4+ CELLS NFR BLD: 51 % (ref 28–63)
CD3+CD4+ CELLS/CD3+CD8+ CLL BLD: 1.5 % (ref 1.4–2.6)
CD3+CD8+ CELLS # BLD: 245 CELLS/UL (ref 125–1312)
CD3+CD8+ CELLS NFR BLD: 34 % (ref 10–40)
CD3-CD16+CD56+ CELLS # BLD: 61 CELLS/UL (ref 95–640)
CD3-CD16+CD56+ CELLS NFR BLD: 8 % (ref 4–25)
HCV AB SERPL QL IA: NONREACTIVE
HIV 1+2 AB+HIV1 P24 AG SERPL QL IA: NONREACTIVE
IFC SPECIMEN: ABNORMAL
TSH SERPL DL<=0.005 MIU/L-ACNC: 1.04 MU/L (ref 0.4–4)
VZV IGG SER QL IA: 1.2 AI (ref 0–0.8)

## 2017-09-02 DIAGNOSIS — R10.13 EPIGASTRIC PAIN: ICD-10-CM

## 2017-09-02 DIAGNOSIS — Z79.899 ENCOUNTER FOR LONG-TERM (CURRENT) USE OF MEDICATIONS: ICD-10-CM

## 2017-09-02 DIAGNOSIS — G35 MULTIPLE SCLEROSIS (H): Primary | ICD-10-CM

## 2017-09-02 LAB
ALBUMIN UR-MCNC: NEGATIVE MG/DL
APPEARANCE UR: CLEAR
BILIRUB UR QL STRIP: NEGATIVE
COLOR UR AUTO: YELLOW
GLUCOSE UR STRIP-MCNC: NEGATIVE MG/DL
HGB UR QL STRIP: NEGATIVE
KETONES UR STRIP-MCNC: NEGATIVE MG/DL
LEUKOCYTE ESTERASE UR QL STRIP: NEGATIVE
NITRATE UR QL: NEGATIVE
PH UR STRIP: 5.5 PH (ref 5–7)
SOURCE: NORMAL
SP GR UR STRIP: 1.01 (ref 1–1.03)
UROBILINOGEN UR STRIP-ACNC: 0.2 EU/DL (ref 0.2–1)

## 2017-09-02 PROCEDURE — 81003 URINALYSIS AUTO W/O SCOPE: CPT | Performed by: PSYCHIATRY & NEUROLOGY

## 2017-09-02 NOTE — TELEPHONE ENCOUNTER
omeprazole (PRILOSEC) 40 MG capsule  Last Written Prescription Date: 06/23/2017  Last Fill Quantity: 0,  # refills: 3   Last Office Visit with [facility], [facility] or [facility] prescribing provider: 08/31/2017

## 2017-09-03 LAB
M TB TUBERC IFN-G BLD QL: NEGATIVE
M TB TUBERC IFN-G/MITOGEN IGNF BLD: 0.04 IU/ML

## 2017-09-06 RX ORDER — OMEPRAZOLE 40 MG/1
40 CAPSULE, DELAYED RELEASE ORAL DAILY
Qty: 90 CAPSULE | Refills: 1 | Status: SHIPPED | OUTPATIENT
Start: 2017-09-06 | End: 2017-10-03

## 2017-09-07 ENCOUNTER — HOSPITAL ENCOUNTER (OUTPATIENT)
Dept: ULTRASOUND IMAGING | Facility: CLINIC | Age: 62
Discharge: HOME | End: 2017-09-07
Attending: FAMILY MEDICINE | Admitting: FAMILY MEDICINE
Payer: COMMERCIAL

## 2017-09-07 DIAGNOSIS — E04.1 THYROID NODULE: ICD-10-CM

## 2017-09-07 PROCEDURE — 76536 US EXAM OF HEAD AND NECK: CPT

## 2017-09-15 ENCOUNTER — TELEPHONE (OUTPATIENT)
Dept: FAMILY MEDICINE | Facility: CLINIC | Age: 62
End: 2017-09-15

## 2017-09-15 DIAGNOSIS — E04.1 THYROID NODULE: Primary | ICD-10-CM

## 2017-09-15 NOTE — TELEPHONE ENCOUNTER
Patient is requesting thyroid results. Please review and advice about follow up on nodules.      IMPRESSION: Four nodules in the thyroid and a few tiny cysts or  nodules measuring less than 5 mm noted. The dominant nodule is in the  inferior left lobe and demonstrate some microcalcifications and is  amenable to fine-needle aspiration if desired.

## 2017-09-16 NOTE — TELEPHONE ENCOUNTER
Called Marylee.  Left message: thyroid nodules.    Benign MOST of the time.  But we should get biopsy to be sure.  Ordered.    For Norma call [phone].  She needs appointment scheduled for Monday or Tuesday.  EARLY Monday is best.    I'll try to schedule, and call Marylee back.    Dr. Carolina Pulliam MD/[facility]

## 2017-09-16 NOTE — PROGRESS NOTES
I called Marylee to discuss results.  She does need FNA, particularly as starting lemtrada for MS in 2 weeks which increases risk of thyroid malignancy - should rule this out first.  Will try to schedule FNA for Monday.  -[facility]. Carolina Pulliam [facility]

## 2017-09-16 NOTE — TELEPHONE ENCOUNTER
Called [facility] radiology; ultrasound will call to schedule.  Dr. Carolina Pulliam MD/[facility]

## 2017-09-19 ENCOUNTER — HOSPITAL ENCOUNTER (OUTPATIENT)
Dept: ULTRASOUND IMAGING | Facility: CLINIC | Age: 62
End: 2017-09-19
Attending: FAMILY MEDICINE | Admitting: COLON & RECTAL SURGERY
Payer: COMMERCIAL

## 2017-09-19 ENCOUNTER — TRANSFERRED RECORDS (OUTPATIENT)
Dept: HEALTH INFORMATION MANAGEMENT | Facility: CLINIC | Age: 62
End: 2017-09-19

## 2017-09-19 ENCOUNTER — TELEPHONE (OUTPATIENT)
Dept: FAMILY MEDICINE | Facility: CLINIC | Age: 62
End: 2017-09-19

## 2017-09-19 ENCOUNTER — HOSPITAL ENCOUNTER (OUTPATIENT)
Facility: CLINIC | Age: 62
Discharge: HOME | End: 2017-09-19
Admitting: FAMILY MEDICINE
Payer: COMMERCIAL

## 2017-09-19 VITALS
SYSTOLIC BLOOD PRESSURE: 158 MMHG | OXYGEN SATURATION: 98 % | HEART RATE: 67 BPM | RESPIRATION RATE: 16 BRPM | DIASTOLIC BLOOD PRESSURE: 74 MMHG

## 2017-09-19 DIAGNOSIS — E04.1 THYROID NODULE: ICD-10-CM

## 2017-09-19 DIAGNOSIS — D69.6 THROMBOCYTOPENIA (H): Primary | ICD-10-CM

## 2017-09-19 PROCEDURE — 88173 CYTOPATH EVAL FNA REPORT: CPT | Mod: 26 | Performed by: RADIOLOGY

## 2017-09-19 PROCEDURE — 88173 CYTOPATH EVAL FNA REPORT: CPT | Performed by: RADIOLOGY

## 2017-09-19 PROCEDURE — 76942 ECHO GUIDE FOR BIOPSY: CPT

## 2017-09-19 PROCEDURE — 25000125 ZZHC RX 250: Performed by: RADIOLOGY

## 2017-09-19 RX ORDER — LIDOCAINE HYDROCHLORIDE 10 MG/ML
3 INJECTION, SOLUTION EPIDURAL; INFILTRATION; INTRACAUDAL; PERINEURAL ONCE
Status: COMPLETED | OUTPATIENT
Start: 2017-09-19 | End: 2017-09-19

## 2017-09-19 RX ADMIN — LIDOCAINE HYDROCHLORIDE 30 MG: 10 INJECTION, SOLUTION EPIDURAL; INFILTRATION; INTRACAUDAL; PERINEURAL at 14:45

## 2017-09-19 NOTE — IP AVS SNAPSHOT
[place] [address]    [place] [address]    Phone:  [phone]                                       After Visit Summary   9/19/2017    Marylee Woods    MRN: [id number]           After Visit Summary Signature Page     I have received my discharge instructions, and my questions have been answered. I have discussed any challenges I see with this plan with the nurse or doctor.    ..........................................................................................................................................  Patient/Patient Representative Signature      ..........................................................................................................................................  Patient Representative Print Name and Relationship to Patient    ..................................................               ................................................  Date                                            Time    ..........................................................................................................................................  Reviewed by Signature/Title    ...................................................              ..............................................  Date                                                            Time

## 2017-09-19 NOTE — PROGRESS NOTES
Post biopsy, lower neck bandage in place, area is without redness or swelling.  Minimal pain.  She had some crackers.   Patient was discharged per her own wheelchair with her .

## 2017-09-19 NOTE — TELEPHONE ENCOUNTER
Yes, hem/onc at [facility] is great.  I'll place referral.  Can you let her know, and I'll call her back at end of day or inbetween pts if I can?  Thanks!!!  Dr. Carolina Pulliam MD/[facility]

## 2017-09-19 NOTE — DISCHARGE INSTRUCTIONS
FNA Thyroid/Lymph Node Biopsy Discharge Instructions     After you go home:      You may resume your normal diet    Care of Puncture Site:      You may have mild bruising, soreness & swelling at the puncture site. This will go away in a few days    For swelling & bruising, you may use an ice pack on the site.     Activity:      You may go back to your normal activity    Avoid strenuous activity for 24 hours    Medicines:      You may resume all medications    For minor pain, you may take Acetaminophen (Tylenol) or Ibuprofen (Advil)            Call the provider who ordered this test if:      Increased redness or swelling at the site    Fluid or blood oozing from the site    Severe pain at the site    Chills or a fever greater than 101 F (38 C).    Any questions or concerns    Call  911 or go to the Emergency Room if:      Trouble breathing    Your neck swells    Bleeding that you cannot control    Severe difficulty swallowing    If you have questions call:      [facility] Radiology Dept @ [phone]    The provider who performed your procedure was _________________.

## 2017-09-19 NOTE — IP AVS SNAPSHOT
MRN:[id number]                      After Visit Summary   9/19/2017    Marylee Woods    MRN: [id number]           Visit Information        Department      9/19/2017  2:07 PM [facility]          Review of your medicines      UNREVIEWED medicines. Ask your doctor about these medicines        Dose / Directions    AUBAGIO 14 MG tablet   Generic drug:  teriflunomide        Refills:  11       baclofen 10 MG tablet   Commonly known as:  LIORESAL   Used for:  Preop general physical exam        Dose:  10 mg   Take 10 mg by mouth 2 times daily   Refills:  0       cholecalciferol 5000 UNITS Caps capsule   Commonly known as:  vitamin D3        Dose:  5000 Units   Take 1 capsule by mouth daily.   Refills:  0       FLUCONAZOLE PO        Refills:  0       gabapentin 300 MG capsule   Commonly known as:  NEURONTIN   Used for:  Preop general physical exam        Dose:  600 mg   Take 600 mg by mouth 2 times daily Pt takes 900mg at night   Refills:  0       hydrochlorothiazide 12.5 MG Tabs tablet   Used for:  Essential hypertension, benign        Dose:  12.5 mg   Take 1 tablet (12.5 mg) by mouth daily   Quantity:  90 tablet   Refills:  3       LORazepam 1 MG tablet   Commonly known as:  ATIVAN   Used for:  Insomnia        Dose:  1 mg   Take 1 tablet (1 mg) by mouth At Bedtime   Quantity:  30 tablet   Refills:  5       multivitamin, therapeutic with minerals Tabs tablet   Used for:  Anemia        Dose:  1 tablet   Take 1 tablet by mouth daily.   Refills:  0       NICOTROL 10 MG Inhaler   Generic drug:  nicotine        Refills:  1       nitroFURantoin macrocrystal 100 MG capsule   Commonly known as:  MACRODANTIN        Dose:  100 mg   Take 100 mg by mouth daily   Refills:  11       * omeprazole 40 MG capsule   Commonly known as:  priLOSEC        TK 1 C PO ONCE QD AC   Refills:  3       * omeprazole 40 MG capsule   Commonly known as:  priLOSEC   Used for:  Epigastric pain        Dose:  40 mg   Take 1  capsule (40 mg) by mouth daily   Quantity:  90 capsule   Refills:  1       oxybutynin 10 MG 24 hr tablet   Commonly known as:  DITROPAN-XL   Used for:  Preop general physical exam        Take by mouth 2 times daily   Refills:  0       sertraline 100 MG tablet   Commonly known as:  ZOLOFT   Used for:  Major depression in complete remission (H)        Dose:  150 mg   Take 1.5 tablets (150 mg) by mouth daily   Quantity:  135 tablet   Refills:  3       valACYclovir 500 MG tablet   Commonly known as:  VALTREX   Used for:  Herpes simplex disease        Dose:  500 mg   Take 1 tablet (500 mg) by mouth 2 times daily   Quantity:  12 tablet   Refills:  5       * Notice:  This list has 2 medication(s) that are the same as other medications prescribed for you. Read the directions carefully, and ask your doctor or other care provider to review them with you.      CONTINUE these medicines which have NOT CHANGED        Dose / Directions    order for DME   Used for:  MS (multiple sclerosis) (H)        Equipment being ordered: medical lift chair   Quantity:  1 Units   Refills:  0                Protect others around you: Learn how to safely use, store and throw away your medicines at www.disposemymeds.org.         Follow-ups after your visit         Care Instructions        Further instructions from your care team       FNA Thyroid/Lymph Node Biopsy Discharge Instructions     After you go home:      You may resume your normal diet    Care of Puncture Site:      You may have mild bruising, soreness & swelling at the puncture site. This will go away in a few days    For swelling & bruising, you may use an ice pack on the site.     Activity:      You may go back to your normal activity    Avoid strenuous activity for 24 hours    Medicines:      You may resume all medications    For minor pain, you may take Acetaminophen (Tylenol) or Ibuprofen (Advil)            Call the provider who ordered this test if:      Increased redness or  "swelling at the site    Fluid or blood oozing from the site    Severe pain at the site    Chills or a fever greater than 101 F (38 C).    Any questions or concerns    Call  911 or go to the Emergency Room if:      Trouble breathing    Your neck swells    Bleeding that you cannot control    Severe difficulty swallowing    If you have questions call:      [facility] Radiology Dept @ [phone]    The provider who performed your procedure was _________________.     Additional Information About Your Visit        [employer] Information     Blackboard lets you send messages to your doctor, view your test results, renew your prescriptions, schedule appointments and more. To sign up, go to [email or web address] . Click on \"Log in\" on the left side of the screen, which will take you to the Welcome page. Then click on \"Sign up Now\" on the right side of the page.     You will be asked to enter the access code listed below, as well as some personal information. Please follow the directions to create your username and password.     Your access code is: [email or web address]  Expires: 2017 11:58 AM     Your access code will  in 90 days. If you need help or a new code, please call your [place] clinic or [phone].        Care EveryWhere ID     This is your Care EveryWhere ID. This could be used by other organizations to access your [place] medical records  [id number]        Your Vitals Were     Blood Pressure Pulse Respirations Last Period Pulse Oximetry       158/74 67 16 (LMP Unknown) 98%        Primary Care Provider Office Phone # Fax #    Carolina Pulliam [phone]      Equal Access to Services     [facility] : Hadii gabriela Medrano, wadirkda ania, qaybkaylen patel . So [facility] [phone].    ATENCIÓN: Si habla español, tiene a de oliveira disposición servicios gratuitos de asistencia lingüística. Llame al [phone].    We comply with " applicable federal civil rights laws and [place] laws. We do not discriminate on the basis of race, color, national origin, age, disability sex, sexual orientation or gender identity.            Thank you!     Thank you for choosing [facility] for your care. Our goal is always to provide you with excellent care. Hearing back from our patients is one way we can continue to improve our services. Please take a few minutes to complete the written survey that you may receive in the mail after you visit with us. Thank you!             Medication List: This is a list of all your medications and when to take them. Check marks below indicate your daily home schedule. Keep this list as a reference.      Medications           Morning Afternoon Evening Bedtime As Needed    AUBAGIO 14 MG tablet   Generic drug:  teriflunomide                                baclofen 10 MG tablet   Commonly known as:  LIORESAL   Take 10 mg by mouth 2 times daily                                cholecalciferol 5000 UNITS Caps capsule   Commonly known as:  vitamin D3   Take 1 capsule by mouth daily.                                FLUCONAZOLE PO                                gabapentin 300 MG capsule   Commonly known as:  NEURONTIN   Take 600 mg by mouth 2 times daily Pt takes 900mg at night                                hydrochlorothiazide 12.5 MG Tabs tablet   Take 1 tablet (12.5 mg) by mouth daily                                LORazepam 1 MG tablet   Commonly known as:  ATIVAN   Take 1 tablet (1 mg) by mouth At Bedtime                                multivitamin, therapeutic with minerals Tabs tablet   Take 1 tablet by mouth daily.                                NICOTROL 10 MG Inhaler   Generic drug:  nicotine                                nitroFURantoin macrocrystal 100 MG capsule   Commonly known as:  MACRODANTIN   Take 100 mg by mouth daily                                * omeprazole 40 MG capsule   Commonly known as:  priLOSEC   TK 1 C PO  ONCE QD AC                                * omeprazole 40 MG capsule   Commonly known as:  priLOSEC   Take 1 capsule (40 mg) by mouth daily                                order for DME   Equipment being ordered: medical lift chair                                oxybutynin 10 MG 24 hr tablet   Commonly known as:  DITROPAN-XL   Take by mouth 2 times daily                                sertraline 100 MG tablet   Commonly known as:  ZOLOFT   Take 1.5 tablets (150 mg) by mouth daily                                valACYclovir 500 MG tablet   Commonly known as:  VALTREX   Take 1 tablet (500 mg) by mouth 2 times daily                                * Notice:  This list has 2 medication(s) that are the same as other medications prescribed for you. Read the directions carefully, and ask your doctor or other care provider to review them with you.

## 2017-09-19 NOTE — TELEPHONE ENCOUNTER
Reason for Call:  Other call back    Detailed comments   The patients neurologist has suggested that she sees a hematologist   Who does Dr Pulliam recommend      Also she has a biopsy scheduled this afternoon and would like a call from Dr Pulliam before she goes         Please call her    Phone Number Patient can be reached at: Home number on file [phone] (home)    Best Time:   anytime  Can we leave a detailed message on this number? YES    Call taken on 9/19/2017 at 10:12 AM by ANTONIO VALLADARES

## 2017-09-19 NOTE — TELEPHONE ENCOUNTER
Talked with pt.  She is having thyroid bx done today at [facility].  She saw neurologist today and her platelets on 8/31 were low (111). Neurologist wants her to see hematologist.  Do you recommend  Oncology hematolgy in [place].  Wanted to see them today- not sure we can make that possible. She is going out of town this weekend and would like to talk with you.  [phone]. Thank you

## 2017-09-19 NOTE — TELEPHONE ENCOUNTER
Please go ahead and fax; Dr. Redman ordered these.  Thanks,  Dr. Carolina Pulliam MD/[facility]

## 2017-09-19 NOTE — TELEPHONE ENCOUNTER
Reason for Call:  Request for results:    Name of test or procedure: labs 8/31 thru 9/2    Date of test of procedure: 8/31-9/2     Location of the test or procedure: [place]    OK to leave the result message on voice mail or with a family member? YES    Phone number Patient can be reached at:  [phone]    Additional comments: please fax all labs to Dr Redman office-they say they are the ordering provider and need the results fax to     Call taken on 9/19/2017 at 8:44 AM by PANTERA SCHULTZ

## 2017-09-21 LAB — COPATH REPORT: NORMAL

## 2017-09-21 NOTE — TELEPHONE ENCOUNTER
Called Marylee back.  No answer.  Left message.  Dr. Carolina Pulliam MD/[facility]

## 2017-09-22 NOTE — PROGRESS NOTES
Can you call Marylee (okay to leave message) and let her know - good news, her thyroid biopsy was benign!  Dr. Carolina Pulliam MD/[facility]

## 2017-10-03 ENCOUNTER — ONCOLOGY VISIT (OUTPATIENT)
Dept: ONCOLOGY | Facility: CLINIC | Age: 62
End: 2017-10-03
Attending: INTERNAL MEDICINE
Payer: COMMERCIAL

## 2017-10-03 ENCOUNTER — HOSPITAL ENCOUNTER (OUTPATIENT)
Facility: CLINIC | Age: 62
Setting detail: SPECIMEN
End: 2017-10-03
Attending: INTERNAL MEDICINE
Payer: MEDICARE

## 2017-10-03 ENCOUNTER — HOSPITAL ENCOUNTER (OUTPATIENT)
Facility: CLINIC | Age: 62
Setting detail: SPECIMEN
Discharge: HOME | End: 2017-10-03
Attending: INTERNAL MEDICINE | Admitting: INTERNAL MEDICINE
Payer: COMMERCIAL

## 2017-10-03 VITALS
SYSTOLIC BLOOD PRESSURE: 119 MMHG | RESPIRATION RATE: 16 BRPM | HEART RATE: 80 BPM | DIASTOLIC BLOOD PRESSURE: 78 MMHG | OXYGEN SATURATION: 99 % | TEMPERATURE: 98.5 F

## 2017-10-03 DIAGNOSIS — D61.818 PANCYTOPENIA (H): Primary | ICD-10-CM

## 2017-10-03 LAB
ALBUMIN SERPL-MCNC: 3.9 G/DL (ref 3.4–5)
ALP SERPL-CCNC: 78 U/L (ref 40–150)
ALT SERPL W P-5'-P-CCNC: 21 U/L (ref 0–50)
ANION GAP SERPL CALCULATED.3IONS-SCNC: 6 MMOL/L (ref 3–14)
AST SERPL W P-5'-P-CCNC: 23 U/L (ref 0–45)
BASOPHILS # BLD AUTO: 0 10E9/L (ref 0–0.2)
BASOPHILS NFR BLD AUTO: 1 %
BILIRUB SERPL-MCNC: 0.3 MG/DL (ref 0.2–1.3)
BUN SERPL-MCNC: 17 MG/DL (ref 7–30)
CALCIUM SERPL-MCNC: 9 MG/DL (ref 8.5–10.1)
CHLORIDE SERPL-SCNC: 105 MMOL/L (ref 94–109)
CO2 SERPL-SCNC: 32 MMOL/L (ref 20–32)
CREAT SERPL-MCNC: 0.8 MG/DL (ref 0.52–1.04)
DIFFERENTIAL METHOD BLD: ABNORMAL
EOSINOPHIL # BLD AUTO: 0.2 10E9/L (ref 0–0.7)
EOSINOPHIL NFR BLD AUTO: 5.2 %
ERYTHROCYTE [DISTWIDTH] IN BLOOD BY AUTOMATED COUNT: 14.2 % (ref 10–15)
FERRITIN SERPL-MCNC: 312 NG/ML (ref 8–252)
FOLATE SERPL-MCNC: 47.9 NG/ML
GFR SERPL CREATININE-BSD FRML MDRD: 73 ML/MIN/1.7M2
GLUCOSE SERPL-MCNC: 81 MG/DL (ref 70–99)
HCT VFR BLD AUTO: 32 % (ref 35–47)
HGB BLD-MCNC: 10.8 G/DL (ref 11.7–15.7)
IMM GRANULOCYTES # BLD: 0 10E9/L (ref 0–0.4)
IMM GRANULOCYTES NFR BLD: 0 %
IRON SATN MFR SERPL: 20 % (ref 15–46)
IRON SERPL-MCNC: 52 UG/DL (ref 35–180)
LDH SERPL L TO P-CCNC: 181 U/L (ref 81–234)
LYMPHOCYTES # BLD AUTO: 0.8 10E9/L (ref 0.8–5.3)
LYMPHOCYTES NFR BLD AUTO: 26 %
MCH RBC QN AUTO: 29 PG (ref 26.5–33)
MCHC RBC AUTO-ENTMCNC: 33.8 G/DL (ref 31.5–36.5)
MCV RBC AUTO: 86 FL (ref 78–100)
MONOCYTES # BLD AUTO: 0.2 10E9/L (ref 0–1.3)
MONOCYTES NFR BLD AUTO: 6.3 %
NEUTROPHILS # BLD AUTO: 1.8 10E9/L (ref 1.6–8.3)
NEUTROPHILS NFR BLD AUTO: 61.5 %
NRBC # BLD AUTO: 0 10*3/UL
NRBC BLD AUTO-RTO: 0 /100
PLATELET # BLD AUTO: 105 10E9/L (ref 150–450)
POTASSIUM SERPL-SCNC: 3.2 MMOL/L (ref 3.4–5.3)
PROT SERPL-MCNC: 7.3 G/DL (ref 6.8–8.8)
RBC # BLD AUTO: 3.73 10E12/L (ref 3.8–5.2)
RETICS # AUTO: 31.7 10E9/L (ref 25–95)
RETICS/RBC NFR AUTO: 0.9 % (ref 0.5–2)
SODIUM SERPL-SCNC: 143 MMOL/L (ref 133–144)
TIBC SERPL-MCNC: 261 UG/DL (ref 240–430)
WBC # BLD AUTO: 2.9 10E9/L (ref 4–11)

## 2017-10-03 PROCEDURE — 00000402 ZZHCL STATISTIC TOTAL PROTEIN: Performed by: INTERNAL MEDICINE

## 2017-10-03 PROCEDURE — 99203 OFFICE O/P NEW LOW 30 MIN: CPT | Performed by: INTERNAL MEDICINE

## 2017-10-03 PROCEDURE — 85045 AUTOMATED RETICULOCYTE COUNT: CPT | Performed by: INTERNAL MEDICINE

## 2017-10-03 PROCEDURE — 83550 IRON BINDING TEST: CPT | Performed by: INTERNAL MEDICINE

## 2017-10-03 PROCEDURE — 83615 LACTATE (LD) (LDH) ENZYME: CPT | Performed by: INTERNAL MEDICINE

## 2017-10-03 PROCEDURE — 83540 ASSAY OF IRON: CPT | Performed by: INTERNAL MEDICINE

## 2017-10-03 PROCEDURE — 82728 ASSAY OF FERRITIN: CPT | Performed by: INTERNAL MEDICINE

## 2017-10-03 PROCEDURE — 84165 PROTEIN E-PHORESIS SERUM: CPT | Performed by: INTERNAL MEDICINE

## 2017-10-03 PROCEDURE — 82746 ASSAY OF FOLIC ACID SERUM: CPT | Performed by: INTERNAL MEDICINE

## 2017-10-03 PROCEDURE — 82607 VITAMIN B-12: CPT | Performed by: INTERNAL MEDICINE

## 2017-10-03 PROCEDURE — 99211 OFF/OP EST MAY X REQ PHY/QHP: CPT

## 2017-10-03 PROCEDURE — 36415 COLL VENOUS BLD VENIPUNCTURE: CPT

## 2017-10-03 PROCEDURE — 40000847 ZZHCL STATISTIC MORPHOLOGY W/INTERP HISTOLOGY TC 85060: Performed by: INTERNAL MEDICINE

## 2017-10-03 PROCEDURE — 80053 COMPREHEN METABOLIC PANEL: CPT | Performed by: INTERNAL MEDICINE

## 2017-10-03 PROCEDURE — 85025 COMPLETE CBC W/AUTO DIFF WBC: CPT | Performed by: INTERNAL MEDICINE

## 2017-10-03 PROCEDURE — 85060 BLOOD SMEAR INTERPRETATION: CPT | Performed by: INTERNAL MEDICINE

## 2017-10-03 ASSESSMENT — PAIN SCALES - GENERAL: PAINLEVEL: NO PAIN (0)

## 2017-10-03 NOTE — MR AVS SNAPSHOT
"              After Visit Summary   10/3/2017    Marylee Woods    MRN: [id number]           Patient Information     Date Of Birth          1955        Visit Information        Provider Department      10/3/2017 2:00 PM Stan Guerrire MD [facility]        Today's Diagnoses     Pancytopenia (H)    -  1      Care Instructions    Labs today.- Drawn by MERLENE Sanon  Let me know when she starts lemtrada. Weekly CBC x 4 and after that CBC every 14 days for 8 weeks.  Please, get her neurologist on phone.  Dr. Favian Redman [facility] [phone].  Follow up as needed.            Follow-ups after your visit        Who to contact     If you have questions or need follow up information about today's clinic visit or your schedule please contact [facility] directly at [phone].  Normal or non-critical lab and imaging results will be communicated to you by MyChart, letter or phone within 4 business days after the clinic has received the results. If you do not hear from us within 7 days, please contact the clinic through [employer] or phone. If you have a critical or abnormal lab result, we will notify you by phone as soon as possible.  Submit refill requests through [employer] or call your pharmacy and they will forward the refill request to us. Please allow 3 business days for your refill to be completed.          Additional Information About Your Visit        MyChart Information     [employer] lets you send messages to your doctor, view your test results, renew your prescriptions, schedule appointments and more. To sign up, go to [email or web address] . Click on \"Log in\" on the left side of the screen, which will take you to the Welcome page. Then click on \"Sign up Now\" on the right side of the page.     You will be asked to enter the access code listed below, as well as some personal information. Please follow the directions to create your username and password.     Your " access code is: [email or web address]  Expires: 2017 11:58 AM     Your access code will  in 90 days. If you need help or a new code, please call your [place] clinic or [phone].        Care EveryWhere ID     This is your Care EveryWhere ID. This could be used by other organizations to access your [place] medical records  [id number]        Your Vitals Were     Pulse Temperature Respirations Last Period Pulse Oximetry       80 98.5  F (36.9  C) (Oral) 16 (LMP Unknown) 99%        Blood Pressure from Last 3 Encounters:   10/03/17 119/78   17 158/74   17 110/66    Weight from Last 3 Encounters:   17 58.5 kg (129 lb)   17 58.3 kg (128 lb 8 oz)   17 54.4 kg (120 lb)              We Performed the Following     Blood Morphology Pathologist Review     CBC with platelets differential     Comprehensive metabolic panel     Ferritin     Folate     Iron and iron binding capacity     Lactate Dehydrogenase     Protein electrophoresis     Reticulocyte count     Vitamin B12          Today's Medication Changes          These changes are accurate as of: 10/3/17 11:59 PM.  If you have any questions, ask your nurse or doctor.               These medicines have changed or have updated prescriptions.        Dose/Directions    omeprazole 40 MG capsule   Commonly known as:  priLOSEC   This may have changed:  Another medication with the same name was removed. Continue taking this medication, and follow the directions you see here.        TK 1 C PO ONCE QD AC   Refills:  3         Stop taking these medicines if you haven't already. Please contact your care team if you have questions.     FLUCONAZOLE PO                    Primary Care Provider Office Phone # Fax #    Carolina Pulliam [phone] [address] [facility] [address]        Equal Access to Services     BRIE PALOMINO AH: Liset Medrano, pete jorge, kaylen issa  chivo sladeaatonja ah. So [facility] [phone].    ATENCIÓN: Si habla charis, tiene a de oliveira disposición servicios gratuitos de asistencia lingüística. Mildred al [phone].    We comply with applicable federal civil rights laws and [place] laws. We do not discriminate on the basis of race, color, national origin, age, disability, sex, sexual orientation, or gender identity.            Thank you!     Thank you for choosing [facility]  for your care. Our goal is always to provide you with excellent care. Hearing back from our patients is one way we can continue to improve our services. Please take a few minutes to complete the written survey that you may receive in the mail after your visit with us. Thank you!             Your Updated Medication List - Protect others around you: Learn how to safely use, store and throw away your medicines at www.disposemymeds.org.          This list is accurate as of: 10/3/17 11:59 PM.  Always use your most recent med list.                   Brand Name Dispense Instructions for use Diagnosis    AUBAGIO 14 MG tablet   Generic drug:  teriflunomide           baclofen 10 MG tablet    LIORESAL     Take 10 mg by mouth 2 times daily    Preop general physical exam       cholecalciferol 5000 UNITS Caps capsule    vitamin D3     Take 1 capsule by mouth daily.        gabapentin 300 MG capsule    NEURONTIN     Take 600 mg by mouth 2 times daily Pt takes 900mg at night    Preop general physical exam       hydrochlorothiazide 12.5 MG Tabs tablet     90 tablet    Take 1 tablet (12.5 mg) by mouth daily    Essential hypertension, benign       LORazepam 1 MG tablet    ATIVAN    30 tablet    Take 1 tablet (1 mg) by mouth At Bedtime    Insomnia       multivitamin, therapeutic with minerals Tabs tablet      Take 1 tablet by mouth daily.    Anemia       NICOTROL 10 MG Inhaler   Generic drug:  nicotine           nitroFURantoin macrocrystal 100 MG capsule    MACRODANTIN     Take 100 mg by mouth daily         omeprazole 40 MG capsule    priLOSEC     TK 1 C PO ONCE QD AC        order for DME     1 Units    Equipment being ordered: medical lift chair    MS (multiple sclerosis) (H)       oxybutynin 10 MG 24 hr tablet    DITROPAN-XL     Take by mouth 2 times daily    Preop general physical exam       sertraline 100 MG tablet    ZOLOFT    135 tablet    Take 1.5 tablets (150 mg) by mouth daily    Major depression in complete remission (H)       valACYclovir 500 MG tablet    VALTREX    12 tablet    Take 1 tablet (500 mg) by mouth 2 times daily    Herpes simplex disease

## 2017-10-03 NOTE — PROGRESS NOTES
"Oncology Rooming Note    October 3, 2017 1:49 PM   Marylee Woods is a 62 year old female who presents for:    Chief Complaint   Patient presents with     Oncology Clinic Visit     Initial Vitals: /78 (BP Location: Left arm, Patient Position: Chair, Cuff Size: Adult Regular)  Pulse 80  Temp 98.5  F (36.9  C) (Oral)  Resp 16  LMP  (LMP Unknown)  SpO2 99% Estimated body mass index is 19.61 kg/(m^2) as calculated from the following:    Height as of 8/4/17: 1.727 m (5' 8\").    Weight as of 8/31/17: 58.5 kg (129 lb). There is no height or weight on file to calculate BSA.  No Pain (0) Comment: Data Unavailable   No LMP recorded (lmp unknown). Patient is postmenopausal.  Allergies reviewed: Yes  Medications reviewed: Yes    Medications: Medication refills not needed today.  Pharmacy name entered into [employer]: [facility] [address] [place] [address] AT [address]    Clinical concerns: None     5 minutes for nursing intake (face to face time)     Sarah Manzo [facility]    Medical Assistant Note:  Marylee Woods presents today for lab draw .    Patient seen by provider today: Yes: Dr. Guerrier.   present during visit today: Not Applicable.    Concerns: No Concerns.    Procedure:  Lab draw site: LAC, Needle type: BF, Gauge: 23 g gauze and coban applied.    Post Assessment:  Labs drawn without difficulty: Yes.    Discharge Plan:  Departure Mode: Ambulatory.    Face to Face Time: 5.    Talita Gutiérrez MA    DISCHARGE PLAN:  Next appointments: See patient instruction section.  Dr. Redman's nurse Ekta will schedule [facility] appointments as well as arranging home care for the lab draws.  HEIDY Montez will follow up with this discharge tomorrow 10/04/17 per HEIDY Jiménez.  Departure Mode: Ambulatory  Accompanied by: self  5 minutes for nursing discharge (face to face time)   Talita Gutiérrez MA              "

## 2017-10-04 DIAGNOSIS — E87.6 HYPOKALEMIA: Primary | ICD-10-CM

## 2017-10-04 LAB
ALBUMIN SERPL ELPH-MCNC: 4.5 G/DL (ref 3.7–5.1)
ALPHA1 GLOB SERPL ELPH-MCNC: 0.3 G/DL (ref 0.2–0.4)
ALPHA2 GLOB SERPL ELPH-MCNC: 0.7 G/DL (ref 0.5–0.9)
B-GLOBULIN SERPL ELPH-MCNC: 0.7 G/DL (ref 0.6–1)
COPATH REPORT: NORMAL
GAMMA GLOB SERPL ELPH-MCNC: 0.8 G/DL (ref 0.7–1.6)
M PROTEIN SERPL ELPH-MCNC: 0 G/DL
PROT PATTERN SERPL ELPH-IMP: NORMAL
VIT B12 SERPL-MCNC: 645 PG/ML (ref 193–986)

## 2017-10-04 RX ORDER — POTASSIUM CHLORIDE 750 MG/1
40 TABLET, EXTENDED RELEASE ORAL ONCE
Qty: 4 TABLET | Refills: 0 | Status: SHIPPED | OUTPATIENT
Start: 2017-10-04 | End: 2017-10-04

## 2017-10-04 NOTE — PROGRESS NOTES
This consult has been requested by Dr. Carolina Pulliam for pancytopenia.      Mrs. MaryLee Woods is a 62-year-old female with multiple sclerosis.  She is currently on treatment with Aubagio.  Plan is for patient to be treated with Lemtrada.  Patient has pancytopenia.  Hematology consult has been requested for evaluation of pancytopenia before Lemtrada can be started.  Lemtrada can cause worsening of cytopenia. Patient has pancytopenia for long time.  Her labs are reviewed and summarized below.   1.  In 11/2011, patient was admitted to the hospital with respiratory infection.  She had prolonged hospitalization requiring intubation.   -On 11/09/2011, WBC of 15.7, hemoglobin 10.4, and platelet of 217.   -On 12/13/2011, WBC of 4.1, hemoglobin 9.2, and platelet 130.   -Since then, multiple CBC have revealed cytopenia. On 11/23/2015, WBC of 3.4, hemoglobin 11.9, platelet of 94.   3.  Multiple labs were done on 08/04/2017.     -WBC 3.0, hemoglobin 11.1, platelet of 97.  MCV of 58.   - BMP is normal.   4.  On 08/31/2017:  -TSH of 1.04.   -WBC of 2.8, hemoglobin of 10.8 and platelets of 111.  MCV of 88.  Neutrophils 60%, lymphocytes 25%.   -HIV nonreactive.     -Hepatitis C is nonreactive.   5.  CT abdomen and pelvis on 11/29/2016 did not reveal any lymphadenopathy or splenomegaly.  It revealed wall thickening of transverse colon.   6.  EGD and colonoscopy was done on 01/26/2017.   -EGD was essentially normal.   -Colonoscopy was normal.   7.  CT chest was done on 08/22/2017.  There are small lung nodules.  Followup had been recommended.  There is a thyroid nodule.   8.  Ultrasound-guided thyroid FNA was done on 09/19/2017.  It is benign.      Patient does not have any complications related to pancytopenia.  No recurrent infection.  No bleeding from any site.  No easy bruising.      REVIEW OF SYSTEMS:    Patient has MS.  Because of that, she is wheelchair bound.  She has fatigue. She has been progressively getting more weak.       No headache.  There is some dizziness.  No chest pain.  No difficulty breathing.  No abdominal pain, nausea or vomiting.  Appetite is fairly good.  No bleeding from any site.  No black stool.  No fevers, chills or night sweats.      All other review of systems negative.      ALLERGIES:  Reviewed.      MEDICATIONS:  Reviewed.      PAST MEDICAL HISTORY:   1.  Pancytopenia.   2.  Lung nodules.   3.  Hypertension.   4.  Depression.   5.  Hyperlipidemia.   6.  Multiple sclerosis.    7.  Right femoral fracture.   8.  Cholecystectomy.      SOCIAL HISTORY:   -She is .     -She has been a smoker for 45 years.  She is trying to quit.   -No alcohol use.        FAMILY HISTORY:     -Father is 85.  He has atrial fibrillation.   -Mother  at the age of 65 from lupus.   -She has 1 sister who is in fairly good health.      PHYSICAL EXAMINATION:   GENERAL:  She is alert, oriented x3.   VITALS:  Reviewed.   EYES:  No icterus.   THROAT:  No ulcer or thrush.   NECK:  Supple, no lymphadenopathy.   LUNGS:  Good air entry bilaterally.  No wheezing.   HEART:  Regular.   ABDOMEN:  Soft, nontender.  Difficult palpation as he is in the wheelchair.  No mass felt.   EXTREMITIES:  Mild ankle edema.  She was wearing a splint.   SKIN:  No petechia.      LABORATORY:  Reviewed.      ASSESSMENT:   1.  A 62-year-old female with chronic pancytopenia. Pancytopenia is most likely secondary to medications.   2.  Multiple sclerosis.   3.  Lung nodules.   4.  Other medical problems including hypertension and hyperlipidemia.      RECOMMENDATIONS:   1.  I had a long discussion with the patient and her .  Labs were reviewed.  I explained to her that she has been cytopenic since .  Her thrombocytopenia in the last few years has been stable.  She also has mild leukopenia which has been stable the last couple of years.  Differential is normal.  She also has anemia.  Overall her pancytopenia has been stable for at least last 2 years.       Different causes of pancytopenia discussed.  It can be due to medications, vitamin B12 deficiency, folate deficiency, myelodysplastic syndrome, multiple myeloma, myelofibrosis, splenomegaly, autoimmune process, and other causes.        Patient has been pancytopenic for a long time. In last 2 years, her pancytopenia has not gotten worse.  More than likely her pancytopenia is drug related.  She is on multiple medications which can cause cytopenia, including Aubagio, Macrodantin and Neurontin.      Discussed regarding further workup.  My plan is to get some labs including peripheral blood smear review, vitamin B12, folate, SPEP, and other labs.  We also discussed regarding bone marrow biopsy.  At this time, my clinical impression is of drug-induced pancytopenia.  I told the patient that in the future we might do a bone marrow biopsy, but at this time we do not need it.      2.  For MS, patient will be starting on Lemtrada. Discussed regarding Lemtrada. It can cause cytopenia.  Cytopenia sometimes can last up to 12 weeks.      CBC is okay to start the Lemtrada.  She can start Lemtrada as per the neurology indication.  We will monitor CBC closely.  After she starts Lemtrada, CBC will be checked once a week for first 1 month.  After that, CBC will be checked every other week for 2 months.       If cytopenia gets worse with Lemtrada, supportive treatment will be done.  For thrombocytopenia or anemia, transfusion will be given.  For neutropenia, Neupogen will be given.  Patient agreeable for this plan.      Complications of pancytopenia discussed.  I advised her to see a physician if she has fever, chills, infection, bleeding, worsening weakness or any other concerns.      3. Patient and her  had multiple questions, which were all answered.  Patient will be seen back as needed.  She will continue to follow up with her primary care physician.      Thanks for the consult.     Addendum: Labs done on 10/03/2017  reviewed.  -WBC 2.9, hemoglobin of 10.8 and platelet of 105.  -CMP is normal except mildly low potassium of 3.2.  -Iron normal at 52.  Ferritin of 312.  -Folate normal at 47.9.  -Vitamin B12 normal at 645.    I called and spoke to her.  Labs were reviewed.  Pancytopenia is stable.  She can start Lemtrada.        NEIL BENITES MD             D: 10/03/2017 16:53   T: 10/03/2017 18:10   MT: BARRON      Name:     WOODS, MARYLEE   MRN:      [id number]        Account:      [id number]   :      1955           Visit Date:   10/03/2017      Document: [id number]       cc: Carolina Pulliam MD

## 2017-10-09 ENCOUNTER — TELEPHONE (OUTPATIENT)
Dept: FAMILY MEDICINE | Facility: CLINIC | Age: 62
End: 2017-10-09

## 2017-10-09 NOTE — TELEPHONE ENCOUNTER
Patient called and requested that I send the biopsy report of her thyroid to the M.S center at \[facility]\"" clinic of neurology at [phone]. I have faxed these for her.

## 2017-10-13 DIAGNOSIS — R30.0 DYSURIA: ICD-10-CM

## 2017-10-13 LAB
ALBUMIN UR-MCNC: 30 MG/DL
APPEARANCE UR: CLEAR
BILIRUB UR QL STRIP: NEGATIVE
COLOR UR AUTO: YELLOW
GLUCOSE UR STRIP-MCNC: NEGATIVE MG/DL
HGB UR QL STRIP: NEGATIVE
KETONES UR STRIP-MCNC: NEGATIVE MG/DL
LEUKOCYTE ESTERASE UR QL STRIP: NEGATIVE
NITRATE UR QL: NEGATIVE
NON-SQ EPI CELLS #/AREA URNS LPF: ABNORMAL /LPF
PH UR STRIP: 7 PH (ref 5–7)
RBC #/AREA URNS AUTO: ABNORMAL /HPF
SOURCE: ABNORMAL
SP GR UR STRIP: 1.01 (ref 1–1.03)
UROBILINOGEN UR STRIP-ACNC: 0.2 EU/DL (ref 0.2–1)
WBC #/AREA URNS AUTO: ABNORMAL /HPF

## 2017-10-13 PROCEDURE — 81001 URINALYSIS AUTO W/SCOPE: CPT | Performed by: FAMILY MEDICINE

## 2017-10-13 NOTE — PROGRESS NOTES
This looks okay - no acute infection.  Could you call patient and let her know?  Dr. Carolina Pulliam MD/[facility]

## 2017-10-31 ENCOUNTER — HOSPITAL ENCOUNTER (INPATIENT)
Facility: CLINIC | Age: 62
LOS: 5 days | Discharge: NURSING, REHAB OR LONG-TERM CARE | End: 2017-11-06
Attending: EMERGENCY MEDICINE | Admitting: PSYCHIATRY & NEUROLOGY
Payer: COMMERCIAL

## 2017-10-31 DIAGNOSIS — F41.9 ANXIETY: Primary | ICD-10-CM

## 2017-10-31 DIAGNOSIS — R41.0 CONFUSION: ICD-10-CM

## 2017-10-31 DIAGNOSIS — R20.2 PARESTHESIA: ICD-10-CM

## 2017-10-31 DIAGNOSIS — Z01.818 PREOP GENERAL PHYSICAL EXAM: ICD-10-CM

## 2017-10-31 DIAGNOSIS — J02.0 STREP THROAT: ICD-10-CM

## 2017-10-31 DIAGNOSIS — F32.5 MAJOR DEPRESSION IN COMPLETE REMISSION (H): ICD-10-CM

## 2017-10-31 DIAGNOSIS — M62.81 GENERALIZED MUSCLE WEAKNESS: ICD-10-CM

## 2017-10-31 DIAGNOSIS — G35 MS (MULTIPLE SCLEROSIS) (H): ICD-10-CM

## 2017-10-31 DIAGNOSIS — N31.9 NEUROGENIC BLADDER: ICD-10-CM

## 2017-10-31 DIAGNOSIS — I10 ESSENTIAL HYPERTENSION, BENIGN: ICD-10-CM

## 2017-10-31 PROCEDURE — 99285 EMERGENCY DEPT VISIT HI MDM: CPT | Mod: 25 | Performed by: EMERGENCY MEDICINE

## 2017-10-31 PROCEDURE — 99284 EMERGENCY DEPT VISIT MOD MDM: CPT | Mod: Z6 | Performed by: EMERGENCY MEDICINE

## 2017-11-01 ENCOUNTER — APPOINTMENT (OUTPATIENT)
Dept: CT IMAGING | Facility: CLINIC | Age: 62
End: 2017-11-01
Attending: EMERGENCY MEDICINE
Payer: COMMERCIAL

## 2017-11-01 ENCOUNTER — APPOINTMENT (OUTPATIENT)
Dept: MRI IMAGING | Facility: CLINIC | Age: 62
End: 2017-11-01
Attending: STUDENT IN AN ORGANIZED HEALTH CARE EDUCATION/TRAINING PROGRAM
Payer: COMMERCIAL

## 2017-11-01 ENCOUNTER — APPOINTMENT (OUTPATIENT)
Dept: GENERAL RADIOLOGY | Facility: CLINIC | Age: 62
End: 2017-11-01
Attending: EMERGENCY MEDICINE
Payer: COMMERCIAL

## 2017-11-01 PROBLEM — G35 MULTIPLE SCLEROSIS (H): Status: ACTIVE | Noted: 2017-11-01

## 2017-11-01 LAB
ALBUMIN SERPL-MCNC: 3.4 G/DL (ref 3.4–5)
ALBUMIN UR-MCNC: NEGATIVE MG/DL
ALP SERPL-CCNC: 77 U/L (ref 40–150)
ALT SERPL W P-5'-P-CCNC: 15 U/L (ref 0–50)
ANION GAP SERPL CALCULATED.3IONS-SCNC: 6 MMOL/L (ref 3–14)
APPEARANCE UR: CLEAR
AST SERPL W P-5'-P-CCNC: 18 U/L (ref 0–45)
BASOPHILS # BLD AUTO: 0 10E9/L (ref 0–0.2)
BASOPHILS NFR BLD AUTO: 0.6 %
BILIRUB SERPL-MCNC: 0.5 MG/DL (ref 0.2–1.3)
BILIRUB UR QL STRIP: NEGATIVE
BUN SERPL-MCNC: 16 MG/DL (ref 7–30)
CALCIUM SERPL-MCNC: 8.9 MG/DL (ref 8.5–10.1)
CHLORIDE SERPL-SCNC: 108 MMOL/L (ref 94–109)
CK SERPL-CCNC: 83 U/L (ref 30–225)
CO2 SERPL-SCNC: 28 MMOL/L (ref 20–32)
COLOR UR AUTO: NORMAL
CREAT SERPL-MCNC: 0.94 MG/DL (ref 0.52–1.04)
DEPRECATED S PYO AG THROAT QL EIA: NORMAL
DIFFERENTIAL METHOD BLD: ABNORMAL
EOSINOPHIL # BLD AUTO: 0.1 10E9/L (ref 0–0.7)
EOSINOPHIL NFR BLD AUTO: 4.1 %
ERYTHROCYTE [DISTWIDTH] IN BLOOD BY AUTOMATED COUNT: 13.2 % (ref 10–15)
FLUAV+FLUBV RNA SPEC QL NAA+PROBE: NEGATIVE
FLUAV+FLUBV RNA SPEC QL NAA+PROBE: NEGATIVE
GFR SERPL CREATININE-BSD FRML MDRD: 61 ML/MIN/1.7M2
GLUCOSE SERPL-MCNC: 94 MG/DL (ref 70–99)
GLUCOSE UR STRIP-MCNC: NEGATIVE MG/DL
HCT VFR BLD AUTO: 32.4 % (ref 35–47)
HGB BLD-MCNC: 10.7 G/DL (ref 11.7–15.7)
HGB UR QL STRIP: NEGATIVE
IMM GRANULOCYTES # BLD: 0 10E9/L (ref 0–0.4)
IMM GRANULOCYTES NFR BLD: 0.3 %
KETONES UR STRIP-MCNC: NEGATIVE MG/DL
LACTATE BLD-SCNC: 1.2 MMOL/L (ref 0.7–2)
LEUKOCYTE ESTERASE UR QL STRIP: NEGATIVE
LYMPHOCYTES # BLD AUTO: 0.5 10E9/L (ref 0.8–5.3)
LYMPHOCYTES NFR BLD AUTO: 14.9 %
MCH RBC QN AUTO: 29 PG (ref 26.5–33)
MCHC RBC AUTO-ENTMCNC: 33 G/DL (ref 31.5–36.5)
MCV RBC AUTO: 88 FL (ref 78–100)
MONOCYTES # BLD AUTO: 0.3 10E9/L (ref 0–1.3)
MONOCYTES NFR BLD AUTO: 7.9 %
NEUTROPHILS # BLD AUTO: 2.5 10E9/L (ref 1.6–8.3)
NEUTROPHILS NFR BLD AUTO: 72.2 %
NITRATE UR QL: NEGATIVE
NRBC # BLD AUTO: 0 10*3/UL
NRBC BLD AUTO-RTO: 0 /100
PH UR STRIP: 6 PH (ref 5–7)
PLATELET # BLD AUTO: 85 10E9/L (ref 150–450)
POTASSIUM SERPL-SCNC: 3.8 MMOL/L (ref 3.4–5.3)
PROCALCITONIN SERPL-MCNC: <0.05 NG/ML
PROT SERPL-MCNC: 6.9 G/DL (ref 6.8–8.8)
RBC # BLD AUTO: 3.69 10E12/L (ref 3.8–5.2)
RBC #/AREA URNS AUTO: 0 /HPF (ref 0–2)
RSV RNA SPEC NAA+PROBE: NEGATIVE
SODIUM SERPL-SCNC: 142 MMOL/L (ref 133–144)
SOURCE: NORMAL
SP GR UR STRIP: 1 (ref 1–1.03)
SPECIMEN SOURCE: NORMAL
SPECIMEN SOURCE: NORMAL
UROBILINOGEN UR STRIP-MCNC: NORMAL MG/DL (ref 0–2)
WBC # BLD AUTO: 3.4 10E9/L (ref 4–11)
WBC #/AREA URNS AUTO: 0 /HPF (ref 0–2)

## 2017-11-01 PROCEDURE — 72156 MRI NECK SPINE W/O & W/DYE: CPT

## 2017-11-01 PROCEDURE — 71020 XR CHEST 2 VW: CPT

## 2017-11-01 PROCEDURE — 84145 PROCALCITONIN (PCT): CPT | Performed by: EMERGENCY MEDICINE

## 2017-11-01 PROCEDURE — 70553 MRI BRAIN STEM W/O & W/DYE: CPT

## 2017-11-01 PROCEDURE — 85025 COMPLETE CBC W/AUTO DIFF WBC: CPT | Performed by: EMERGENCY MEDICINE

## 2017-11-01 PROCEDURE — 87880 STREP A ASSAY W/OPTIC: CPT | Performed by: EMERGENCY MEDICINE

## 2017-11-01 PROCEDURE — 81001 URINALYSIS AUTO W/SCOPE: CPT | Performed by: EMERGENCY MEDICINE

## 2017-11-01 PROCEDURE — 70450 CT HEAD/BRAIN W/O DYE: CPT

## 2017-11-01 PROCEDURE — 25000128 H RX IP 250 OP 636: Performed by: PSYCHIATRY & NEUROLOGY

## 2017-11-01 PROCEDURE — 25000128 H RX IP 250 OP 636: Performed by: EMERGENCY MEDICINE

## 2017-11-01 PROCEDURE — 25000132 ZZH RX MED GY IP 250 OP 250 PS 637: Performed by: STUDENT IN AN ORGANIZED HEALTH CARE EDUCATION/TRAINING PROGRAM

## 2017-11-01 PROCEDURE — 82550 ASSAY OF CK (CPK): CPT | Performed by: EMERGENCY MEDICINE

## 2017-11-01 PROCEDURE — 96360 HYDRATION IV INFUSION INIT: CPT | Performed by: EMERGENCY MEDICINE

## 2017-11-01 PROCEDURE — 40000275 ZZH STATISTIC RCP TIME EA 10 MIN: Performed by: OPTOMETRIST

## 2017-11-01 PROCEDURE — 25000132 ZZH RX MED GY IP 250 OP 250 PS 637: Performed by: EMERGENCY MEDICINE

## 2017-11-01 PROCEDURE — 87081 CULTURE SCREEN ONLY: CPT | Performed by: EMERGENCY MEDICINE

## 2017-11-01 PROCEDURE — 87631 RESP VIRUS 3-5 TARGETS: CPT | Performed by: EMERGENCY MEDICINE

## 2017-11-01 PROCEDURE — 80053 COMPREHEN METABOLIC PANEL: CPT | Performed by: EMERGENCY MEDICINE

## 2017-11-01 PROCEDURE — A9585 GADOBUTROL INJECTION: HCPCS | Performed by: PSYCHIATRY & NEUROLOGY

## 2017-11-01 PROCEDURE — 96361 HYDRATE IV INFUSION ADD-ON: CPT | Performed by: EMERGENCY MEDICINE

## 2017-11-01 PROCEDURE — 94660 CPAP INITIATION&MGMT: CPT | Performed by: OPTOMETRIST

## 2017-11-01 PROCEDURE — 87077 CULTURE AEROBIC IDENTIFY: CPT | Performed by: EMERGENCY MEDICINE

## 2017-11-01 PROCEDURE — 12000008 ZZH R&B INTERMEDIATE UMMC

## 2017-11-01 PROCEDURE — 25000128 H RX IP 250 OP 636: Performed by: STUDENT IN AN ORGANIZED HEALTH CARE EDUCATION/TRAINING PROGRAM

## 2017-11-01 PROCEDURE — 83605 ASSAY OF LACTIC ACID: CPT | Performed by: EMERGENCY MEDICINE

## 2017-11-01 RX ORDER — POTASSIUM CHLORIDE 7.45 MG/ML
10 INJECTION INTRAVENOUS
Status: DISCONTINUED | OUTPATIENT
Start: 2017-11-01 | End: 2017-11-06 | Stop reason: HOSPADM

## 2017-11-01 RX ORDER — ACETAMINOPHEN 325 MG/1
650 TABLET ORAL ONCE
Status: COMPLETED | OUTPATIENT
Start: 2017-11-01 | End: 2017-11-01

## 2017-11-01 RX ORDER — POTASSIUM CHLORIDE 1.5 G/1.58G
20-40 POWDER, FOR SOLUTION ORAL
Status: DISCONTINUED | OUTPATIENT
Start: 2017-11-01 | End: 2017-11-06 | Stop reason: HOSPADM

## 2017-11-01 RX ORDER — ONDANSETRON 2 MG/ML
4 INJECTION INTRAMUSCULAR; INTRAVENOUS EVERY 6 HOURS PRN
Status: DISCONTINUED | OUTPATIENT
Start: 2017-11-01 | End: 2017-11-06 | Stop reason: HOSPADM

## 2017-11-01 RX ORDER — TERIFLUNOMIDE 14 MG/1
14 TABLET, FILM COATED ORAL DAILY
Status: DISCONTINUED | OUTPATIENT
Start: 2017-11-01 | End: 2017-11-06 | Stop reason: HOSPADM

## 2017-11-01 RX ORDER — MAGNESIUM SULFATE HEPTAHYDRATE 40 MG/ML
4 INJECTION, SOLUTION INTRAVENOUS EVERY 4 HOURS PRN
Status: DISCONTINUED | OUTPATIENT
Start: 2017-11-01 | End: 2017-11-06 | Stop reason: HOSPADM

## 2017-11-01 RX ORDER — GABAPENTIN 300 MG/1
600 CAPSULE ORAL EVERY MORNING
Status: ON HOLD | COMMUNITY
End: 2017-11-06

## 2017-11-01 RX ORDER — NITROFURANTOIN 25; 75 MG/1; MG/1
100 CAPSULE ORAL DAILY
Status: DISCONTINUED | OUTPATIENT
Start: 2017-11-01 | End: 2017-11-06 | Stop reason: HOSPADM

## 2017-11-01 RX ORDER — HYDROCHLOROTHIAZIDE 12.5 MG/1
12.5 TABLET ORAL DAILY
Status: DISCONTINUED | OUTPATIENT
Start: 2017-11-01 | End: 2017-11-06 | Stop reason: HOSPADM

## 2017-11-01 RX ORDER — LORAZEPAM 1 MG/1
1 TABLET ORAL AT BEDTIME
Status: DISCONTINUED | OUTPATIENT
Start: 2017-11-01 | End: 2017-11-01

## 2017-11-01 RX ORDER — LORAZEPAM 1 MG/1
1 TABLET ORAL
Status: DISCONTINUED | OUTPATIENT
Start: 2017-11-01 | End: 2017-11-06 | Stop reason: HOSPADM

## 2017-11-01 RX ORDER — OXYBUTYNIN CHLORIDE 10 MG/1
10 TABLET, EXTENDED RELEASE ORAL
Status: DISCONTINUED | OUTPATIENT
Start: 2017-11-01 | End: 2017-11-06 | Stop reason: HOSPADM

## 2017-11-01 RX ORDER — GABAPENTIN 300 MG/1
900 CAPSULE ORAL AT BEDTIME
Status: ON HOLD | COMMUNITY
End: 2017-11-06

## 2017-11-01 RX ORDER — NITROFURANTOIN 25; 75 MG/1; MG/1
100 CAPSULE ORAL EVERY 12 HOURS SCHEDULED
Status: DISCONTINUED | OUTPATIENT
Start: 2017-11-01 | End: 2017-11-01

## 2017-11-01 RX ORDER — GADOBUTROL 604.72 MG/ML
7.5 INJECTION INTRAVENOUS ONCE
Status: COMPLETED | OUTPATIENT
Start: 2017-11-01 | End: 2017-11-01

## 2017-11-01 RX ORDER — POTASSIUM CHLORIDE 750 MG/1
20-40 TABLET, EXTENDED RELEASE ORAL
Status: DISCONTINUED | OUTPATIENT
Start: 2017-11-01 | End: 2017-11-06 | Stop reason: HOSPADM

## 2017-11-01 RX ORDER — AMOXICILLIN 250 MG
1 CAPSULE ORAL 2 TIMES DAILY PRN
Status: DISCONTINUED | OUTPATIENT
Start: 2017-11-01 | End: 2017-11-06 | Stop reason: HOSPADM

## 2017-11-01 RX ORDER — ACETAMINOPHEN 325 MG/1
650 TABLET ORAL EVERY 4 HOURS PRN
Status: DISCONTINUED | OUTPATIENT
Start: 2017-11-01 | End: 2017-11-06 | Stop reason: HOSPADM

## 2017-11-01 RX ORDER — ONDANSETRON 4 MG/1
4 TABLET, ORALLY DISINTEGRATING ORAL EVERY 6 HOURS PRN
Status: DISCONTINUED | OUTPATIENT
Start: 2017-11-01 | End: 2017-11-06 | Stop reason: HOSPADM

## 2017-11-01 RX ORDER — POTASSIUM CL/LIDO/0.9 % NACL 10MEQ/0.1L
10 INTRAVENOUS SOLUTION, PIGGYBACK (ML) INTRAVENOUS
Status: DISCONTINUED | OUTPATIENT
Start: 2017-11-01 | End: 2017-11-06 | Stop reason: HOSPADM

## 2017-11-01 RX ORDER — AMOXICILLIN 250 MG
2 CAPSULE ORAL 2 TIMES DAILY PRN
Status: DISCONTINUED | OUTPATIENT
Start: 2017-11-01 | End: 2017-11-06 | Stop reason: HOSPADM

## 2017-11-01 RX ORDER — SODIUM CHLORIDE 9 MG/ML
INJECTION, SOLUTION INTRAVENOUS CONTINUOUS
Status: DISCONTINUED | OUTPATIENT
Start: 2017-11-01 | End: 2017-11-04

## 2017-11-01 RX ORDER — POTASSIUM CHLORIDE 29.8 MG/ML
20 INJECTION INTRAVENOUS
Status: DISCONTINUED | OUTPATIENT
Start: 2017-11-01 | End: 2017-11-01 | Stop reason: RX

## 2017-11-01 RX ORDER — IBUPROFEN 200 MG
200-600 TABLET ORAL EVERY 6 HOURS PRN
Status: DISCONTINUED | OUTPATIENT
Start: 2017-11-01 | End: 2017-11-06 | Stop reason: HOSPADM

## 2017-11-01 RX ORDER — BACLOFEN 10 MG/1
10 TABLET ORAL 2 TIMES DAILY
Status: DISCONTINUED | OUTPATIENT
Start: 2017-11-01 | End: 2017-11-05

## 2017-11-01 RX ORDER — GABAPENTIN 300 MG/1
600 CAPSULE ORAL 2 TIMES DAILY
Status: DISCONTINUED | OUTPATIENT
Start: 2017-11-01 | End: 2017-11-02

## 2017-11-01 RX ADMIN — OXYBUTYNIN CHLORIDE 10 MG: 10 TABLET, EXTENDED RELEASE ORAL at 12:23

## 2017-11-01 RX ADMIN — BACLOFEN 10 MG: 10 TABLET ORAL at 21:31

## 2017-11-01 RX ADMIN — SODIUM CHLORIDE 1000 ML: 9 INJECTION, SOLUTION INTRAVENOUS at 00:46

## 2017-11-01 RX ADMIN — ONDANSETRON 4 MG: 2 INJECTION INTRAMUSCULAR; INTRAVENOUS at 21:32

## 2017-11-01 RX ADMIN — IBUPROFEN 600 MG: 200 TABLET, FILM COATED ORAL at 17:37

## 2017-11-01 RX ADMIN — ACETAMINOPHEN 650 MG: 325 TABLET, FILM COATED ORAL at 04:53

## 2017-11-01 RX ADMIN — CHOLECALCIFEROL CAP 125 MCG (5000 UNIT) 5000 UNITS: 125 CAP at 12:22

## 2017-11-01 RX ADMIN — OXYBUTYNIN CHLORIDE 10 MG: 10 TABLET, EXTENDED RELEASE ORAL at 22:03

## 2017-11-01 RX ADMIN — GADOBUTROL 7.5 ML: 604.72 INJECTION INTRAVENOUS at 20:43

## 2017-11-01 RX ADMIN — BACLOFEN 10 MG: 10 TABLET ORAL at 12:24

## 2017-11-01 RX ADMIN — SODIUM CHLORIDE 1000 MG: 9 INJECTION, SOLUTION INTRAVENOUS at 12:24

## 2017-11-01 RX ADMIN — OMEPRAZOLE 40 MG: 20 CAPSULE, DELAYED RELEASE ORAL at 12:23

## 2017-11-01 RX ADMIN — SODIUM CHLORIDE: 9 INJECTION, SOLUTION INTRAVENOUS at 08:44

## 2017-11-01 RX ADMIN — GABAPENTIN 600 MG: 300 CAPSULE ORAL at 12:24

## 2017-11-01 RX ADMIN — ACETAMINOPHEN 650 MG: 325 TABLET, FILM COATED ORAL at 16:02

## 2017-11-01 RX ADMIN — SERTRALINE HYDROCHLORIDE 150 MG: 50 TABLET ORAL at 21:31

## 2017-11-01 RX ADMIN — HYDROCHLOROTHIAZIDE 12.5 MG: 12.5 TABLET ORAL at 12:24

## 2017-11-01 RX ADMIN — GABAPENTIN 600 MG: 300 CAPSULE ORAL at 21:32

## 2017-11-01 RX ADMIN — NITROFURANTOIN (MONOHYDRATE/MACROCRYSTALS) 100 MG: 75; 25 CAPSULE ORAL at 12:22

## 2017-11-01 ASSESSMENT — PAIN DESCRIPTION - DESCRIPTORS
DESCRIPTORS: ACHING
DESCRIPTORS: ACHING

## 2017-11-01 ASSESSMENT — ENCOUNTER SYMPTOMS
FEVER: 0
DIARRHEA: 0
ABDOMINAL PAIN: 0
FATIGUE: 1
DYSURIA: 0
VOMITING: 0
SHORTNESS OF BREATH: 0
WEAKNESS: 1

## 2017-11-01 NOTE — ED PROVIDER NOTES
History     Chief Complaint   Patient presents with     Spasms     ahs been dx with MS, today was having a lot spasms today. Per spouse he noted that pt is incoherent, confusion. Per spouse Neurologist has been made aware but pt will not be seen until later in the PM [place] 11/1     HPI  Marylee Woods is a 62-year-old female with a history of MS who presents to Emergency Department today with complaint of altered mentation today.   states she had an MS,  exacerbation,  a couple weeks ago.  He states he is not 100% sure exactly what her increased symptoms were at that time because she was managing it.  He states that she got it,  Dosepak,  of steroids, in which he states that she received double dose of her normal steroids for 2 weeks.  He states this ended this past Thursday, 5 days ago.  He states she seemed to feel she was back to normal.  However, Saturday, she started feeling generally weak and having increased trouble with transfers.  Sunday she was more weak and was unable to transfer at all.  Sunday she did fall while in the bathroom trying to transfer.  He states that it was a slow fall, though she hyperextended both of her knees and landed on her butt.  She did not strike her head.  Monday she was not able to bear weight on her right leg due to pain, during physical therapy, went to [facility] and had x-rays which were negative.  Today she s been quite confused and complaining of increased spasms in her legs and arms.  She s been incoherent at times.  She s gotten that way in the past, even worse, with dehydration and infection.  Her  gave her some extra fluids and her mentation improved but her spasms seem to be worse.  She slept much of the day.  She also complained of left ear pain and feeling mentally foggy but no other specific localizing symptoms.  There s been no fevers, no vomiting or diarrhea, no complaints of dysuria.      I have reviewed the Medications, Allergies, Past  Medical and Surgical History, and Social History in the [facility] system.    Past Medical History:   Diagnosis Date     Gastro-oesophageal reflux disease      Multiple sclerosis (H)        Past Surgical History:   Procedure Laterality Date     C/SECTION, LOW TRANSVERSE  1992     COLONOSCOPY  2007     COLONOSCOPY N/A 1/26/2017    Procedure: COMBINED COLONOSCOPY, SINGLE OR MULTIPLE BIOPSY/POLYPECTOMY BY BIOPSY;  Surgeon: Mayo Adkins MD, MD;  Location:  GI     ESOPHAGOSCOPY, GASTROSCOPY, DUODENOSCOPY (EGD), COMBINED  1/26/2017    Dr. Adkins [facility]     ESOPHAGOSCOPY, GASTROSCOPY, DUODENOSCOPY (EGD), COMBINED N/A 1/26/2017    Procedure: COMBINED ESOPHAGOSCOPY, GASTROSCOPY, DUODENOSCOPY (EGD), BIOPSY SINGLE OR MULTIPLE;  Surgeon: Mayo Adkins MD, MD;  Location:  GI     HIP SURGERY  2009    femur ortho surgery     LAPAROSCOPIC CHOLECYSTECTOMY  6/24/2014    Procedure: LAPAROSCOPIC CHOLECYSTECTOMY;  Surgeon: Randy Bailey MD;  Location: [facility]     OPEN REDUCTION INTERNAL FIXATION RODDING INTRAMEDULLARY TIBIA  4/14/2013    Procedure: OPEN REDUCTION INTERNAL FIXATION RODDING INTRAMEDULLARY TIBIA;;  Surgeon: Sajan Cast MD;  Location:  OR     ORTHOPEDIC SURGERY  2009    surgery right upper femur fx near hip       Family History   Problem Relation Age of Onset     HEART DISEASE Maternal Grandmother      CANCER Maternal Grandfather      HEART DISEASE Paternal Grandfather      HEART DISEASE Mother      HEART DISEASE Father      DIABETES No family hx of      Coronary Artery Disease No family hx of      Hypertension No family hx of      Hyperlipidemia No family hx of      CEREBROVASCULAR DISEASE No family hx of      Breast Cancer No family hx of      Colon Cancer No family hx of      Prostate Cancer No family hx of      Other Cancer No family hx of      Depression No family hx of      Anxiety Disorder No family hx of      MENTAL ILLNESS No family hx of      Substance Abuse No family hx of      Anesthesia Reaction No  family hx of      Asthma No family hx of      OSTEOPOROSIS No family hx of      Genetic Disorder No family hx of      Thyroid Disease No family hx of      Obesity No family hx of      Unknown/Adopted No family hx of        Social History   Substance Use Topics     Smoking status: Light Tobacco Smoker     Packs/day: 0.50     Types: Cigarettes     Smokeless tobacco: Never Used     Alcohol use 0.0 oz/week     0 Standard drinks or equivalent per week      Comment: 2/month       No current facility-administered medications for this encounter.      Current Outpatient Prescriptions   Medication     omeprazole (PRILOSEC) 40 MG capsule     sertraline (ZOLOFT) 100 MG tablet     hydrochlorothiazide 12.5 MG TABS tablet     nitrofurantoin macrocrystal (MACRODANTIN) 100 MG capsule     valACYclovir (VALTREX) 500 MG tablet     gabapentin (NEURONTIN) 300 MG capsule     baclofen (LIORESAL) 10 MG tablet     oxybutynin (DITROPAN-XL) 10 MG 24 hr tablet     cholecalciferol (VITAMIN D3) 5000 UNITS CAPS capsule     AUBAGIO 14 MG tablet     NICOTROL 10 MG inhaler     LORazepam (ATIVAN) 1 MG tablet     ORDER FOR DME     multivitamin, therapeutic with minerals (THERA-VIT-M) TABS        Allergies   Allergen Reactions     Budesonide      Review of Systems   Constitutional: Positive for fatigue. Negative for fever.   Respiratory: Negative for shortness of breath.    Cardiovascular: Negative for chest pain.   Gastrointestinal: Negative for abdominal pain, diarrhea and vomiting.   Genitourinary: Negative for dysuria.   Neurological: Positive for weakness.   All other systems reviewed and are negative.      Physical Exam   BP: 150/72  Heart Rate: 70  Temp: 97.7  F (36.5  C)  Resp: 14  Weight:  (BOO, pt having MS exacerbation)  SpO2: 97 %      Physical Exam   Constitutional: No distress.   HENT:   Head: Atraumatic.   Mouth/Throat: Oropharynx is clear and moist. No oropharyngeal exudate.   TMs clear   Eyes: Pupils are equal, round, and reactive to  light. No scleral icterus.   Neck: Neck supple.   Cardiovascular: Normal heart sounds and intact distal pulses.    Pulmonary/Chest: Breath sounds normal. No respiratory distress.   Abdominal: Soft. There is no tenderness.   Musculoskeletal: She exhibits no edema or tenderness.   Neurological: She is alert.    strength equal bilaterally.  She has some movement of both her feet, though it's difficult to assess if it's equal acids fairly minor.  She answers basic questions, seemingly appropriately.  She does seem somewhat confused.   Skin: Skin is warm. No rash noted. She is not diaphoretic.       ED Course     ED Course     Procedures             Critical Care time:  none             Labs Ordered and Resulted from Time of ED Arrival Up to the Time of Departure from the ED   CBC WITH PLATELETS DIFFERENTIAL - Abnormal; Notable for the following:        Result Value    WBC 3.4 (*)     RBC Count 3.69 (*)     Hemoglobin 10.7 (*)     Hematocrit 32.4 (*)     Platelet Count 85 (*)     Absolute Lymphocytes 0.5 (*)     All other components within normal limits   COMPREHENSIVE METABOLIC PANEL   ROUTINE UA WITH MICROSCOPIC   LACTIC ACID WHOLE BLOOD   PROCALCITONIN   CK TOTAL   VITAL SIGNS   INFLUENZA A AND B AND RSV PCR   RAPID STREP SCREEN   BETA STREP GROUP A CULTURE            Assessments & Plan (with Medical Decision Making)   The cause of her symptoms is not clear.  I did consider meningitis, although the patient is afebrile, and denies headache.  Her neck is supple.  We did check for signs of infection including chest x-ray, urinalysis, rapid strep, influenza PCR (as her  says he was recently ill, though the patient has not had respiratory symptoms).  Basic labs are otherwise not remarkable.  The patient did become somewhat agitated after being in the ER for several hours, repeatedly stating that she thought she is going to die.  She then did complain of a headache.  Head CT was done and did not show anything  acute.  It is unclear to me what caused her symptoms.  Her  states she's had similar symptoms in the past with infections or dehydration.  She was given some fluids here.  There is not clear evidence of infection at this time, lactate is normal, pro-calcitonin is low.  She denies any abdominal pain or tenderness on exam.  There was a slow fall onto her bottom, after which she was evaluated by orthopedics with negative imaging.  The patient's  denies any head trauma or any other significant trauma I did speak with Dr. Brenner, from neurology, who does agree to accept the patient for admission.  She will be admitted to the [facility] for further evaluation, stabilization, treatment.    I have reviewed the nursing notes.    I have reviewed the findings, diagnosis, plan and need for follow up with the patient.    Current Discharge Medication List          Final diagnoses:   Confusion   Generalized muscle weakness   MS (multiple sclerosis) (H)   IJose, am serving as a trained medical scribe to document services personally performed by Maranda Amador MD, based on the provider's statements to me.   Maranda COLON MD, was physically present and have reviewed and verified the accuracy of this note documented by Jose Cole.    10/31/2017   [facility], [place], EMERGENCY DEPARTMENT     Maranda Amadro MD  11/01/17 0713

## 2017-11-01 NOTE — PHARMACY-ADMISSION MEDICATION HISTORY
Admission medication history interview status for the 10/31/2017 admission is complete. See Epic admission navigator for allergy information, pharmacy, prior to admission medications and immunization status.     Medication history interview sources:  Patient, Patient's , [employer] fill records, Chart Review    Changes made to PTA medication list (reason)  Added:        - Potassium Chloride (OTC strength for K supplementation).   Deleted:        - Lorazepam (Does not take this anymore).   Changed:        - Gabapentin (Changed directions-- pt reports taking 600 mg QAM and 900 mg QHS).        - Nitrofurantoin (Pt is taking this for UTI prophylaxis).        - Sertraline (Dose decreased from 150 mg QHS to 100 mg QHS per pt).     Additional medication history information (including reliability of information, actions taken by pharmacist):       - Pt is a good historian and was able to confirm PTA med list and last doses with minimal prompting.        - Pt's  will bring in her Aubagio.        - Last flu shot 11/1/2016.       Prior to Admission medications    Medication Sig Last Dose Taking? Auth Provider   gabapentin (NEURONTIN) 300 MG capsule Take 600 mg by mouth every morning 10/31/2017 at Unknown time Yes Unknown, Entered By History   gabapentin (NEURONTIN) 300 MG capsule Take 900 mg by mouth At Bedtime 10/31/2017 at Unknown time Yes Unknown, Entered By History   POTASSIUM CHLORIDE PO Take 99 mg by mouth daily 10/31/2017 at Unknown time Yes Unknown, Entered By History   omeprazole (PRILOSEC) 40 MG capsule Take one capsule by mouth daily with breakfast. 10/31/2017 at Unknown time Yes Reported, Patient   sertraline (ZOLOFT) 100 MG tablet Take 1.5 tablets (150 mg) by mouth daily  Patient taking differently: Take 100 mg by mouth At Bedtime  10/31/2017 at Unknown time Yes Carolina Pulliam MD   hydrochlorothiazide 12.5 MG TABS tablet Take 1 tablet (12.5 mg) by mouth daily 10/31/2017 at Unknown time Yes  Carolina Pulliam MD   nitrofurantoin macrocrystal (MACRODANTIN) 100 MG capsule Take 100 mg by mouth daily UTI prophylaxis 10/31/2017 at Unknown time Yes Reported, Patient   AUBAGIO 14 MG tablet Take 14 mg by mouth daily  10/31/2017 at Unknown time Yes Reported, Patient   NICOTROL 10 MG inhaler Inhale 1 Cartridge into the lungs daily as needed for smoking cessation  Past Month at Unknown time Yes Reported, Patient   baclofen (LIORESAL) 10 MG tablet Take 10 mg by mouth 2 times daily  10/31/2017 at Unknown time Yes Malorie Bates MD   oxybutynin (DITROPAN-XL) 10 MG 24 hr tablet Take by mouth 2 times daily 10/31/2017 at Unknown time Yes Malorie Bates MD   cholecalciferol (VITAMIN D3) 5000 UNITS CAPS capsule Take 1 capsule by mouth daily. 10/30/2017 at Unknown time Yes Randall Martinez MD   multivitamin, therapeutic with minerals (THERA-VIT-M) TABS Take 1 tablet by mouth daily. 10/31/2017 at Unknown time Yes Ethel Miranda APRN CNP   valACYclovir (VALTREX) 500 MG tablet Take 1 tablet (500 mg) by mouth 2 times daily  Patient taking differently: Take 500 mg by mouth 2 times daily as needed  More than a month at Unknown time  Carolina Pulliam MD   ORDER FOR DME Equipment being ordered: medical lift chair   Malorie Bates MD         Medication history completed by: Jessica Geronimo (Justin), PharmD Candidate

## 2017-11-01 NOTE — ED NOTES
" is historian: Patient experienced an MS exacerbation 2 weeks ago, took a \"double dose\" steroid as usual, stopped Thursday. Saturday night very weak. Sunday morning she could not transfer to her chair. Fell Sunday after Druze, hyperextending both of her knees. PT on Monday patient could not participate in therapy and recommended x-rayed right side hip, thigh, bi-lat knee and calf. No Fx noted. Patient awoke this morning disoriented, arm and back spasms. Hx of dehydration which has lead to confusion and UTI's which make her confused as well. After nap today at 1200 patient experienced increasing spasms. Neurologist said they could not see her until tomorrow and if increasing symptoms to come to ED. Today patient presents with left inner ear pain, increasing spasms in bi-lat arms, and saying her legs needed to be straightened out despite them already being straight. Patient expresses having a foggy brain, wants to see her Neurologist in [place] tomorrow for the 3 PM appointment, and wanting to go home.  is concerned patient may be dehydrated or have UTI or something. Reports patient has been sleeping all day which is very unusual for her. Patient normally able to care for self and manage Rx by herself.  has had to help patient increasingly over the last couple of days.   "

## 2017-11-01 NOTE — ED NOTES
"Pt yelling out \"Ow! Ow! It hurts! Head hurts!\" She reports a sudden increase in pain on the top of her head. MD notified.  "

## 2017-11-01 NOTE — PROGRESS NOTES
"I was contacted regarding Ms. Marinelli. She is a 62 year old woman with MS presenting with several days of increased difficulty with transfers, increased spasms, and confusion/\"feeling foggy\". She was treated two weeks ago for a MS exacerbation with a \"Dose Pack\",  was not certain what the symptoms were at that time. Infectious workup has been unrevealing.     We will admit to neurology for possible MS exacerbation and consider IV steroids.    "

## 2017-11-01 NOTE — H&P
"[facility]  Neurology Admission  11/1/2017      Marylee Woods MRN# [id number]   YOB: 1955 Age: 62 year old      Date of Admission:  10/31/2017    Primary care provider: Carolina Pulliam         Assessment and Plan:   Assessment: Marylee Woods is a 62 year old female with PMH significant for MS (follows with Dr. Redman at [facility]), painful paresthesias, thyroid nodule, and neurogenic bladder who presents in likely multiple sclerosis exacerbation. Patient was treated for exacerbation about two weeks ago with a \"dose pack\" - notes currently unavailable although likely this was a medrol dose pack given past treatment history. Strength worsened again after that treatment was completed. Follows with outside provider, most recent MRI available from 11/11/2011.     # Multiple sclerosis, acute exacerbation:  No clear infectious source causing exacerbation. CXR and UA negative in ED. Patient does have L ear pain, although these were normal appearing in the ED. Will obtain imaging and treat acute exacerbation with steroids.  -Continue home aubagio 14mg qd  -MRI brain and c-spine w/ and w/o contrast  -Methylprednisolone 1g qd x3 days   -Continue home baclofen 20mg BID for spasticity  -Continue vitamin D3 supplementation  -Continue home omeprazole 40mg qd while on steroids    # Pancytopenia:  Patient has been worked up as OP at heme/onc. This is thought to be due to medication side effect from MS drugs or nitrofurantoin. Currently stable.  -CBC daily    # Painful paresthesias:  -Continue home gabapentin 600mg BID    # Mood disorder:  -Continue home sertraline 150mg qd    # Sleep disorder:  -Continue home ativan 1mg qhs prn    # Neurogenic bladder:  -Continue home oxybutynin 20mg qd, nitrofurantoin 100mg daily    # HTN:  -Continue home HCTZ 12.5mg qd    Code: FULL  FEN: Regular adult diet  Px: heparin SQ    Dispo: pending MRI, treating with IV steroids, " "these may be changed to PO pending clinical course, dispo pending PT eval    Patient discussed with attending physician, Dr. Brenner.    Arsh De Leon  PGY-2 Neurology  [phone]             History of Present Illness:       CC: weakness    History is obtained from the patient and/or family and medical record      Marylee Woods is a 62 year old female PMH significant for MS (follows with Dr. Redman at [facility]), painful paresthesias, thyroid nodule, and neurogenic bladder who presents with worsening weakness over the last 4 days.    Patient developed weakness about 2 weeks ago and reports calling her primary neurologist who put her on a \"dose pack\" of steroids. Patient finished course on Thursday and reported feeling better. She then had decline on Saturday or Sunday where she started to have more difficulty with transfers. At baseline patient walks with a walker and uses wheelchair occasionally. The patient reports falling several times over the last few days. She has also developed a feeling of her brain \"feeling fuzzy.\" She is complaining of left ear pain. She is also complaining of blurry vision in both her eyes.    She presented to the ED for her worsening weakness. Infectious workup was negative. Patient is afebrile. She does not have an elevated WBC (although has pancytopenia at baseline). She was admitted for further workup and evaluation.            Past Medical History:     Past Medical History:   Diagnosis Date     Gastro-oesophageal reflux disease      Multiple sclerosis (H)                Social History:     Social History     Social History     Marital status:      Spouse name: N/A     Number of children: N/A     Years of education: N/A     Occupational History     Not on file.     Social History Main Topics     Smoking status: Light Tobacco Smoker     Packs/day: 0.50     Types: Cigarettes     Smokeless tobacco: Never Used     Alcohol use 0.0 oz/week     0 Standard drinks " or equivalent per week      Comment: 2/month     Drug use: No     Sexual activity: Yes     Partners: Male     Other Topics Concern     Not on file     Social History Narrative             Family History:     Family History   Problem Relation Age of Onset     HEART DISEASE Maternal Grandmother      CANCER Maternal Grandfather      HEART DISEASE Paternal Grandfather      HEART DISEASE Mother      HEART DISEASE Father      DIABETES No family hx of      Coronary Artery Disease No family hx of      Hypertension No family hx of      Hyperlipidemia No family hx of      CEREBROVASCULAR DISEASE No family hx of      Breast Cancer No family hx of      Colon Cancer No family hx of      Prostate Cancer No family hx of      Other Cancer No family hx of      Depression No family hx of      Anxiety Disorder No family hx of      MENTAL ILLNESS No family hx of      Substance Abuse No family hx of      Anesthesia Reaction No family hx of      Asthma No family hx of      OSTEOPOROSIS No family hx of      Genetic Disorder No family hx of      Thyroid Disease No family hx of      Obesity No family hx of      Unknown/Adopted No family hx of             Allergies:      Allergies   Allergen Reactions     Budesonide              Medications:     Prescription Medications as of 11/1/2017             omeprazole (PRILOSEC) 40 MG capsule TK 1 C PO ONCE QD AC    sertraline (ZOLOFT) 100 MG tablet Take 1.5 tablets (150 mg) by mouth daily    hydrochlorothiazide 12.5 MG TABS tablet Take 1 tablet (12.5 mg) by mouth daily    nitrofurantoin macrocrystal (MACRODANTIN) 100 MG capsule Take 100 mg by mouth daily    valACYclovir (VALTREX) 500 MG tablet Take 1 tablet (500 mg) by mouth 2 times daily    AUBAGIO 14 MG tablet     NICOTROL 10 MG inhaler     LORazepam (ATIVAN) 1 MG tablet Take 1 tablet (1 mg) by mouth At Bedtime    gabapentin (NEURONTIN) 300 MG capsule Take 600 mg by mouth 2 times daily Pt takes 900mg at night    baclofen (LIORESAL) 10 MG tablet  "Take 10 mg by mouth 2 times daily     oxybutynin (DITROPAN-XL) 10 MG 24 hr tablet Take by mouth 2 times daily    ORDER FOR DME Equipment being ordered: medical lift chair    cholecalciferol (VITAMIN D3) 5000 UNITS CAPS capsule Take 1 capsule by mouth daily.    multivitamin, therapeutic with minerals (THERA-VIT-M) TABS Take 1 tablet by mouth daily.      Facility Administered Medications as of 11/1/2017             0.9% sodium chloride BOLUS Inject 1,000 mLs into the vein once    acetaminophen (TYLENOL) tablet 650 mg Take 2 tablets (650 mg) by mouth once    0.9% sodium chloride infusion Inject into the vein continuous    potassium chloride SA (K-DUR/KLOR-CON M) CR tablet 20-40 mEq Take 2-4 tablets (20-40 mEq) by mouth every 2 hours as needed for potassium supplementation    potassium chloride (KLOR-CON) Packet 20-40 mEq 20-40 mEq by Oral or Feeding Tube route every 2 hours as needed for potassium supplementation    potassium chloride 10 mEq in 100 mL sterile water intermittent infusion (premix) Inject 100 mLs (10 mEq) into the vein every hour as needed for potassium supplementation    potassium chloride 10 mEq in 100 mL intermittent infusion with 10 mg lidocaine Inject 100 mLs (10 mEq) into the vein every hour as needed for potassium supplementation    magnesium sulfate 4 g in 100 mL sterile water (premade) Inject 100 mLs (4 g) into the vein every 4 hours as needed for magnesium supplementation    acetaminophen (TYLENOL) tablet 650 mg Take 2 tablets (650 mg) by mouth every 4 hours as needed for mild pain    senna-docusate (SENOKOT-S;PERICOLACE) 8.6-50 MG per tablet 1 tablet Take 1 tablet by mouth 2 times daily as needed for constipation    Linked Group 1:  \"Or\" Linked Group Details     senna-docusate (SENOKOT-S;PERICOLACE) 8.6-50 MG per tablet 2 tablet Take 2 tablets by mouth 2 times daily as needed for constipation    Linked Group 1:  \"Or\" Linked Group Details     magnesium hydroxide (MILK OF MAGNESIA) suspension 30 " "mL Take 30 mLs by mouth daily as needed for constipation    sodium phosphate (FLEET ENEMA) 1 enema Place 1 enema rectally once as needed for constipation    ondansetron (ZOFRAN-ODT) ODT tab 4 mg Take 1 tablet (4 mg) by mouth every 6 hours as needed for nausea or vomiting    Linked Group 2:  \"Or\" Linked Group Details     ondansetron (ZOFRAN) injection 4 mg Inject 2 mLs (4 mg) into the vein every 6 hours as needed for nausea or vomiting    Linked Group 2:  \"Or\" Linked Group Details     teriflunomide (AUBAGIO) tablet 14 mg Take 1 tablet (14 mg) by mouth daily    baclofen (LIORESAL) tablet 10 mg Take 1 tablet (10 mg) by mouth 2 times daily    cholecalciferol (vitamin D3) capsule CAPS 5,000 Units Take 1 capsule (5,000 Units) by mouth daily    gabapentin (NEURONTIN) capsule 600 mg Take 2 capsules (600 mg) by mouth 2 times daily    hydrochlorothiazide tablet 12.5 mg Take 1 tablet (12.5 mg) by mouth daily    LORazepam (ATIVAN) tablet 1 mg Take 1 tablet (1 mg) by mouth At Bedtime    sertraline (ZOLOFT) tablet 150 mg Take 150 mg by mouth daily    oxybutynin (DITROPAN-XL) 24 hr tablet 10 mg Take 1 tablet (10 mg) by mouth 2 times daily    omeprazole (priLOSEC) CR capsule 40 mg Take 2 capsules (40 mg) by mouth daily    methylPREDNISolone sodium succinate (solu-MEDROL) 1,000 mg in NaCl 0.9 % 250 mL intermittent infusion Inject 1,000 mg into the vein daily    potassium chloride 20 mEq in 50 mL intermittent infusion (Discontinued) Inject 50 mLs (20 mEq) into the vein every hour as needed for potassium supplementation                Review of Systems:   The 10 point Review of Systems is negative other than noted in the HPI         Physical Exam:   /65  Temp 97.7  F (36.5  C) (Oral)  Resp 14  LMP  (LMP Unknown)  SpO2 97%   Physical Exam:   General: Lays in bed with eyes close  HEENT: sclera anicteric  Resp: Breathing comfortably, no respiratory distress  CVS: RRR  Skin: warm and dry  Extremities: No " edema  Neurologic:  Mental Status: Fully alert, attentive and oriented. Speech soft but clear.   Cranial Nerves: Visual fields intact. PERRL. Does not look left when asked, however able to break midline with oculocephalics, otherwise EOMI. Facial movements symmetric. Hearing intact to conversation. Palate elevation symmetric, uvula midline. No dysarthria. Tongue protrusion midline.  Motor: Antigravity in UE bilaterally, strength 3-4/5 (improved with some coaching), strength worse on L. In LE patient able to move legs, no antigravity movement seen, again strength worse on L. Feet have 4/5 strength.   Reflexes: 3+ and symmetric in UE/LE  Sensory: Decreased light touch sensation on LUE/LLE  Coordination: unable to complete due to weakness  Station/Gait: Deferred              Data:   CBC:  Lab Results   Component Value Date    WBC 3.4 11/01/2017     Lab Results   Component Value Date    HGB 10.7 11/01/2017     Lab Results   Component Value Date    HCT 32.4 11/01/2017     Lab Results   Component Value Date    PLT 85 11/01/2017       Last Basic Metabolic Panel:  Lab Results   Component Value Date     11/01/2017      Lab Results   Component Value Date    POTASSIUM 3.8 11/01/2017     Lab Results   Component Value Date    CHLORIDE 108 11/01/2017     Lab Results   Component Value Date    JORDON 8.9 11/01/2017     Lab Results   Component Value Date    CO2 28 11/01/2017     Lab Results   Component Value Date    BUN 16 11/01/2017     Lab Results   Component Value Date    CR 0.94 11/01/2017     Lab Results   Component Value Date    GLC 94 11/01/2017

## 2017-11-01 NOTE — PLAN OF CARE
"Problem: Patient Care Overview  Goal: Plan of Care/Patient Progress Review  Outcome: No Change  Disoriented to time.  Strengths of 2/5 to upper and lower extremities.  Baseline ambulates with walker at home.  Recent Medrol Dose pack from primary for MS exacerbation.  Continues to have progressive weakness and feels \"foggy\" and showing more signs of confusion than usual.  IV methylprednisolone started and MR brain ordered.  MRI screening sent, MRI to happen tonight.  Incontinent of urine and stool.  Complaining of L ear pain with APAP and ibuprofen for relief.  Regular diet, no signs of aspiration.        "

## 2017-11-02 LAB
ANION GAP SERPL CALCULATED.3IONS-SCNC: 6 MMOL/L (ref 3–14)
BUN SERPL-MCNC: 19 MG/DL (ref 7–30)
CALCIUM SERPL-MCNC: 8.6 MG/DL (ref 8.5–10.1)
CHLORIDE SERPL-SCNC: 112 MMOL/L (ref 94–109)
CO2 SERPL-SCNC: 25 MMOL/L (ref 20–32)
CREAT SERPL-MCNC: 0.93 MG/DL (ref 0.52–1.04)
ERYTHROCYTE [DISTWIDTH] IN BLOOD BY AUTOMATED COUNT: 13.6 % (ref 10–15)
GFR SERPL CREATININE-BSD FRML MDRD: 61 ML/MIN/1.7M2
GLUCOSE SERPL-MCNC: 114 MG/DL (ref 70–99)
HCT VFR BLD AUTO: 30.5 % (ref 35–47)
HGB BLD-MCNC: 9.9 G/DL (ref 11.7–15.7)
MCH RBC QN AUTO: 28.3 PG (ref 26.5–33)
MCHC RBC AUTO-ENTMCNC: 32.5 G/DL (ref 31.5–36.5)
MCV RBC AUTO: 87 FL (ref 78–100)
PLATELET # BLD AUTO: 101 10E9/L (ref 150–450)
POTASSIUM SERPL-SCNC: 3.9 MMOL/L (ref 3.4–5.3)
RBC # BLD AUTO: 3.5 10E12/L (ref 3.8–5.2)
SODIUM SERPL-SCNC: 143 MMOL/L (ref 133–144)
WBC # BLD AUTO: 3.5 10E9/L (ref 4–11)

## 2017-11-02 PROCEDURE — 94660 CPAP INITIATION&MGMT: CPT

## 2017-11-02 PROCEDURE — 25000132 ZZH RX MED GY IP 250 OP 250 PS 637: Performed by: PSYCHIATRY & NEUROLOGY

## 2017-11-02 PROCEDURE — 25000132 ZZH RX MED GY IP 250 OP 250 PS 637: Performed by: STUDENT IN AN ORGANIZED HEALTH CARE EDUCATION/TRAINING PROGRAM

## 2017-11-02 PROCEDURE — 12000008 ZZH R&B INTERMEDIATE UMMC

## 2017-11-02 PROCEDURE — 80048 BASIC METABOLIC PNL TOTAL CA: CPT | Performed by: STUDENT IN AN ORGANIZED HEALTH CARE EDUCATION/TRAINING PROGRAM

## 2017-11-02 PROCEDURE — 40000809 ZZH STATISTIC NO DOCUMENTATION TO SUPPORT CHARGE

## 2017-11-02 PROCEDURE — 36415 COLL VENOUS BLD VENIPUNCTURE: CPT | Performed by: STUDENT IN AN ORGANIZED HEALTH CARE EDUCATION/TRAINING PROGRAM

## 2017-11-02 PROCEDURE — 85027 COMPLETE CBC AUTOMATED: CPT | Performed by: STUDENT IN AN ORGANIZED HEALTH CARE EDUCATION/TRAINING PROGRAM

## 2017-11-02 PROCEDURE — 25000128 H RX IP 250 OP 636: Performed by: STUDENT IN AN ORGANIZED HEALTH CARE EDUCATION/TRAINING PROGRAM

## 2017-11-02 RX ORDER — GABAPENTIN 600 MG/1
600 TABLET ORAL EVERY MORNING
Status: DISCONTINUED | OUTPATIENT
Start: 2017-11-03 | End: 2017-11-06 | Stop reason: HOSPADM

## 2017-11-02 RX ORDER — GABAPENTIN 300 MG/1
900 CAPSULE ORAL AT BEDTIME
Status: DISCONTINUED | OUTPATIENT
Start: 2017-11-02 | End: 2017-11-06 | Stop reason: HOSPADM

## 2017-11-02 RX ADMIN — NITROFURANTOIN (MONOHYDRATE/MACROCRYSTALS) 100 MG: 75; 25 CAPSULE ORAL at 08:29

## 2017-11-02 RX ADMIN — BACLOFEN 10 MG: 10 TABLET ORAL at 20:29

## 2017-11-02 RX ADMIN — SODIUM CHLORIDE 1000 MG: 9 INJECTION, SOLUTION INTRAVENOUS at 08:28

## 2017-11-02 RX ADMIN — CHOLECALCIFEROL CAP 125 MCG (5000 UNIT) 5000 UNITS: 125 CAP at 08:28

## 2017-11-02 RX ADMIN — OXYBUTYNIN CHLORIDE 10 MG: 10 TABLET, EXTENDED RELEASE ORAL at 20:30

## 2017-11-02 RX ADMIN — LORAZEPAM 1 MG: 1 TABLET ORAL at 20:30

## 2017-11-02 RX ADMIN — GABAPENTIN 600 MG: 300 CAPSULE ORAL at 08:29

## 2017-11-02 RX ADMIN — ACETAMINOPHEN 650 MG: 325 TABLET, FILM COATED ORAL at 04:18

## 2017-11-02 RX ADMIN — SERTRALINE HYDROCHLORIDE 150 MG: 50 TABLET ORAL at 20:29

## 2017-11-02 RX ADMIN — OXYBUTYNIN CHLORIDE 10 MG: 10 TABLET, EXTENDED RELEASE ORAL at 08:29

## 2017-11-02 RX ADMIN — SODIUM CHLORIDE: 9 INJECTION, SOLUTION INTRAVENOUS at 12:14

## 2017-11-02 RX ADMIN — HYDROCHLOROTHIAZIDE 12.5 MG: 12.5 TABLET ORAL at 08:28

## 2017-11-02 RX ADMIN — ACETAMINOPHEN 650 MG: 325 TABLET, FILM COATED ORAL at 14:48

## 2017-11-02 RX ADMIN — TERIFLUNOMIDE 14 MG: 14 TABLET, FILM COATED ORAL at 17:15

## 2017-11-02 RX ADMIN — BACLOFEN 10 MG: 10 TABLET ORAL at 08:29

## 2017-11-02 RX ADMIN — OMEPRAZOLE 40 MG: 20 CAPSULE, DELAYED RELEASE ORAL at 08:28

## 2017-11-02 RX ADMIN — LORAZEPAM 1 MG: 1 TABLET ORAL at 00:34

## 2017-11-02 RX ADMIN — IBUPROFEN 600 MG: 200 TABLET, FILM COATED ORAL at 16:02

## 2017-11-02 RX ADMIN — GABAPENTIN 900 MG: 300 CAPSULE ORAL at 20:30

## 2017-11-02 ASSESSMENT — VISUAL ACUITY
OU: NORMAL ACUITY
OU: NORMAL ACUITY
OU: BASELINE;GLASSES

## 2017-11-02 ASSESSMENT — PAIN DESCRIPTION - DESCRIPTORS: DESCRIPTORS: ACHING;HEADACHE

## 2017-11-02 NOTE — PLAN OF CARE
Problem: Patient Care Overview  Goal: Plan of Care/Patient Progress Review  Outcome: No Change  Pt on 6A after MS exacerbation. VSS on 2L NC overnight (pt refused CPAP). A&Ox4. Neurologically, BUE 3/5, BLE 2/5, numbness/tingling present in d/t MS. Tolerating regular diet, needs assist w/feeding. IVMF @100mL/hr into PIV. Up w/lift per report, not OOB this shift. Turned and repositioned Q2hrs. Incontinent of bowel and bladder. No BM this shift. Generalized pain managed with Tylenol. PCDs on. Will get IV methylpred today.  Will continue to monitor and follow plan of care.

## 2017-11-02 NOTE — PROGRESS NOTES
SPIRITUAL HEALTH SERVICES  SPIRITUAL ASSESSMENT Progress Note  [facility] ([place]) 6A     PRIMARY FOCUS:     Emotional/spiritual/Moravian distress    Support for coping    REFERRAL SOURCE: Patient requested a  visit     ILLNESS CIRCUMSTANCES:   Reviewed documentation. Reflective conversation shared with patient  which integrated elements of illness and family narratives.     Context of Serious Illness/Symptom(s) - Patient reports that she feels that she is getting weaker and her vision was also affected so she had her  bring her to the ED    Resources for Support - Patient has a , Manuel and she mentioned a sister, and she states many Pentecostal members who are praying for her.  She also has a good friend who stops by weekly to offer support.  Patient also mentioned her cat Bjorn as an important member of the family.     DISTRESS:     Emotional/Spiritual/Existential Distress - Patient's main concern today is that she does not want to be a burden to her .     Anglican Distress - She reports that she tries to pray but at times does not feel that God is supporting her through all of this.     Social/Cultural/Economic Distress - She did bring up the concern about her 's salary since he changed jobs recently.     SPIRITUAL/Gnosticist COPING:     Gnosticist/Chelsie - Temple     Spiritual Practice(s) - Members of a [employer].  She has people praying for her healing. Asked for a prayer.    Emotional/Relational/Existential Connections - She has a strong chelsie, points and explains a tattoo on her wrist that has Moravian meaning for her. Reflected on her worries today and what she may be able to do when they arise.     GOALS OF CARE:    Goals of Care - Patient hopes to be able to return home with her .  She is worried that if she has to start a new medication she may not be able to stay alone.     Meaning/Sense-Making - Trying to make meaning of the role that her MS may be playing  in her life and what she still has to offer others.     PLAN: Notify unit  of care provided and that the patient is open to SH visits. She also knows that she can have nursing  page the on call  if need arises.     Jacquelin Morales  Staff   Pager [phone]

## 2017-11-02 NOTE — PLAN OF CARE
Problem: Patient Care Overview  Goal: Plan of Care/Patient Progress Review  8931-0929: Pt VSS. Neuros unchanged with disorientation to exact time, AE 2/5. Had MRI done this shift. C/o mild nausea, zofran given with relief. SCDs on. Denies pain. Up with 2, lift. Turn/repo Q2hrs and PRN. Incontinent of bowel/bladder, changed x1. Pt able to report when wet/soiled. Reg diet, tolerated fair, total feed. CPAP on overnight.

## 2017-11-02 NOTE — PLAN OF CARE
Problem: Patient Care Overview  Goal: Plan of Care/Patient Progress Review  Outcome: Improving  Pt alert and oriented x 4.  Pt reported numbness/tingling BLE. Pt unable to lift legs, was able to flex feet weakly. BUE moderate strength. Pt had adequate PO intake and 3 incontinent voids. Bottom has an area non-blanchable red, barrier cream applied and pt turn q 2 hours. Continues on 100 ml/ hr NS, no pain or issues with IV site. Pt stated  will bring her MS med Aubagio tonight. Pt was able to feed herself, requiring some assistance with meal prep/cutting of food. Denied pain for most of the day. Spiritual health visited pt. Pt took nap after lunch- had 2LPM NC O2 on during nap. Pt stated  will also bring home CPAP machine tonight.

## 2017-11-02 NOTE — PROGRESS NOTES
[facility], [place]   Neurology Progress Note  11/2/2017    Summary: Marylee Woods is a 62 year old female with h/o MS who presents in presumed acute exacerbation of MS. MRI brain and c-spine completed without enhancing lesions. Started on 3 day course of solumedrol 1g qd 11/1.    Subjective:    No acute events overnight, patient with no new complaints. Appears improved from yesterday, patient does not feel she is back to her baseline.     Objective:    Vitals: /59  Temp 96.8  F (36  C) (Axillary)  Resp 14  LMP  (LMP Unknown)  SpO2 96%  General: NAD  HEENT: sclera anicteric  Cardiac: RRR  Chest: No respiratory distress  Extremities: No LE edema.    Neurologic:   Mental Status: Fully alert, attentive and oriented. Speech soft but clear. Improved mentation compared to yesterday.   Cranial Nerves: Visual fields intact. PERRL. EOMI. Facial movements symmetric. Hearing intact to conversation. Palate elevation symmetric, uvula midline. No dysarthria. Tongue protrusion midline.  Motor: Antigravity in UE bilaterally, strength 3-4/5 (improved with some coaching), strength worse on L. In LE: antigravity movement on R, movement of L.  Reflexes: 3+ and symmetric in UE/LE  Sensory: Decreased light touch sensation on LUE/LLE  Coordination: FNF intact  Station/Gait: Deferred      Pertinent Investigations:    MRI c-spine 11/1  Impression:   1. Few scattered areas of T2 hyperintensities in the cervical spine,  as detailed above, with no definite evidence of corresponding  enhancement. Findings likely related to demyelination foci within the  cervical spine with no definite evidence of active disease. Difficult  to evaluate for progression given the limited prior exams due to  motion artifact.  2. Mild multilevel degenerative changes of the cervical spine.      MRI brain 11/1  Impression: The study demonstrates numerous foci of T2-hyperintensity  within the cerebral white matter consistent with  the clinical  suspicion of demyelinating disease. There no abnormal enhancement  noted . There are likely increased number of lesions, but direct  comparison is difficult due to motion, especially on the prior study  and different imaging planes compared to prior study.     Assessment/Plan:   # Multiple sclerosis, acute exacerbation:  No clear infectious source causing exacerbation. CXR and UA negative in ED. Patient does have L ear pain, although these were normal appearing in the ED. MRI without enhancing lesions. Continue to treat with steroids.   -Continue home aubagio 14mg qd  -Methylprednisolone 1g qd x3 days (day 2/3)   -Continue home baclofen 20mg BID for spasticity  -Continue vitamin D3 supplementation  -Continue home omeprazole 40mg qd while on steroids     # Pancytopenia:  Patient has been worked up as OP at heme/onc. This is thought to be due to medication side effect from MS drugs or nitrofurantoin. Currently stable.  -CBC daily     # Painful paresthesias:  -Continue home gabapentin 600-900mg     # Mood disorder:  -Continue home sertraline 150mg qd     # Sleep disorder:  -Continue home ativan 1mg qhs prn     # Neurogenic bladder:  -Continue home oxybutynin 20mg qd, nitrofurantoin 100mg daily     # HTN:  -Continue home HCTZ 12.5mg qd     Code: FULL  FEN: Regular adult diet  Px: heparin SQ     Dispo: pending improvement in strength, likely can d/c to home vs rehab in 1-2 days      Patient seen and discussed with attending physician Dr. Brenner.    Arsh De Leon  Neurology PGY-2

## 2017-11-03 ENCOUNTER — APPOINTMENT (OUTPATIENT)
Dept: PHYSICAL THERAPY | Facility: CLINIC | Age: 62
End: 2017-11-03
Attending: STUDENT IN AN ORGANIZED HEALTH CARE EDUCATION/TRAINING PROGRAM
Payer: COMMERCIAL

## 2017-11-03 PROCEDURE — 25000132 ZZH RX MED GY IP 250 OP 250 PS 637: Performed by: PSYCHIATRY & NEUROLOGY

## 2017-11-03 PROCEDURE — 40000802 ZZH SITE CHECK

## 2017-11-03 PROCEDURE — 40000141 ZZH STATISTIC PERIPHERAL IV START W/O US GUIDANCE

## 2017-11-03 PROCEDURE — 25000128 H RX IP 250 OP 636: Performed by: STUDENT IN AN ORGANIZED HEALTH CARE EDUCATION/TRAINING PROGRAM

## 2017-11-03 PROCEDURE — 40000193 ZZH STATISTIC PT WARD VISIT

## 2017-11-03 PROCEDURE — 94660 CPAP INITIATION&MGMT: CPT | Performed by: OPTOMETRIST

## 2017-11-03 PROCEDURE — 12000008 ZZH R&B INTERMEDIATE UMMC

## 2017-11-03 PROCEDURE — 97530 THERAPEUTIC ACTIVITIES: CPT | Mod: GP

## 2017-11-03 PROCEDURE — 25000132 ZZH RX MED GY IP 250 OP 250 PS 637: Performed by: STUDENT IN AN ORGANIZED HEALTH CARE EDUCATION/TRAINING PROGRAM

## 2017-11-03 PROCEDURE — 97161 PT EVAL LOW COMPLEX 20 MIN: CPT | Mod: GP

## 2017-11-03 RX ORDER — AMOXICILLIN 500 MG/1
500 CAPSULE ORAL EVERY 12 HOURS SCHEDULED
Status: DISCONTINUED | OUTPATIENT
Start: 2017-11-03 | End: 2017-11-06 | Stop reason: HOSPADM

## 2017-11-03 RX ADMIN — GABAPENTIN 900 MG: 300 CAPSULE ORAL at 19:53

## 2017-11-03 RX ADMIN — ACETAMINOPHEN 650 MG: 325 TABLET, FILM COATED ORAL at 19:52

## 2017-11-03 RX ADMIN — ONDANSETRON 4 MG: 2 INJECTION INTRAMUSCULAR; INTRAVENOUS at 15:46

## 2017-11-03 RX ADMIN — HYDROCHLOROTHIAZIDE 12.5 MG: 12.5 TABLET ORAL at 08:07

## 2017-11-03 RX ADMIN — TERIFLUNOMIDE 14 MG: 14 TABLET, FILM COATED ORAL at 08:09

## 2017-11-03 RX ADMIN — BACLOFEN 10 MG: 10 TABLET ORAL at 19:52

## 2017-11-03 RX ADMIN — OXYBUTYNIN CHLORIDE 10 MG: 10 TABLET, EXTENDED RELEASE ORAL at 19:52

## 2017-11-03 RX ADMIN — OXYBUTYNIN CHLORIDE 10 MG: 10 TABLET, EXTENDED RELEASE ORAL at 08:06

## 2017-11-03 RX ADMIN — AMOXICILLIN 500 MG: 500 CAPSULE ORAL at 12:57

## 2017-11-03 RX ADMIN — CHOLECALCIFEROL CAP 125 MCG (5000 UNIT) 5000 UNITS: 125 CAP at 08:07

## 2017-11-03 RX ADMIN — GABAPENTIN 600 MG: 600 TABLET, FILM COATED ORAL at 08:07

## 2017-11-03 RX ADMIN — SERTRALINE HYDROCHLORIDE 150 MG: 50 TABLET ORAL at 19:52

## 2017-11-03 RX ADMIN — LORAZEPAM 1 MG: 1 TABLET ORAL at 19:52

## 2017-11-03 RX ADMIN — IBUPROFEN 400 MG: 200 TABLET, FILM COATED ORAL at 12:57

## 2017-11-03 RX ADMIN — NITROFURANTOIN (MONOHYDRATE/MACROCRYSTALS) 100 MG: 75; 25 CAPSULE ORAL at 08:07

## 2017-11-03 RX ADMIN — BACLOFEN 10 MG: 10 TABLET ORAL at 08:07

## 2017-11-03 RX ADMIN — IBUPROFEN 600 MG: 200 TABLET, FILM COATED ORAL at 18:52

## 2017-11-03 RX ADMIN — SODIUM CHLORIDE 1000 MG: 9 INJECTION, SOLUTION INTRAVENOUS at 08:11

## 2017-11-03 RX ADMIN — OMEPRAZOLE 40 MG: 20 CAPSULE, DELAYED RELEASE ORAL at 08:07

## 2017-11-03 RX ADMIN — ACETAMINOPHEN 650 MG: 325 TABLET, FILM COATED ORAL at 08:07

## 2017-11-03 RX ADMIN — ACETAMINOPHEN 650 MG: 325 TABLET, FILM COATED ORAL at 04:06

## 2017-11-03 RX ADMIN — AMOXICILLIN 500 MG: 500 CAPSULE ORAL at 19:52

## 2017-11-03 ASSESSMENT — VISUAL ACUITY
OU: BASELINE;GLASSES
OU: BASELINE;GLASSES

## 2017-11-03 ASSESSMENT — PAIN DESCRIPTION - DESCRIPTORS: DESCRIPTORS: HEADACHE

## 2017-11-03 NOTE — PLAN OF CARE
Problem: Patient Care Overview  Goal: Plan of Care/Patient Progress Review  Outcome: No Change  Pt alert and oriented x4. Pupils equal and reactive to light. BUE 4/5 strength, moderate grasp. BLE weak against gravity and resistance (1-2/5) L<R, and weak foot flexion.Reported numbness and tingling in BLE. Incontinent of bowel and bladder, occasionally continent and uses bed pan. Pt checked and turned q 2 hours. Pt appetite had good oral intake. Started on abx. Sister visited in the afternoon. Reported headache, received tylenol at 0800 and requested ibuprofen in the afternoon. Pt stated headache has not subsided. Will continue to monitor.

## 2017-11-03 NOTE — PLAN OF CARE
"Problem: Patient Care Overview  Goal: Plan of Care/Patient Progress Review  Discharge Planner PT   Patient plan for discharge: not stated   Current status: PT eval completed. Tx indicated. Pt performs rolling in bed with min A and strong use of UE on bedrail. Performed supine>sit with mod A x 2 for management of trunk and LEs with HOB slightly elevated. Pt requiring min A for sitting balance at EOB secondary to posterior lean. She performed stand-pivot transfer from bed>wc with min-mod A x 2. Significantly dec LE strength L>R. Pt also co of \"tingling\" below knee on L LE and above knee on R LE.   Barriers to return to prior living situation: level of A, support at home (SO works full time), current level of functional mobility vs baseline, medical needs   Recommendations for discharge: TCU   Rationale for recommendations: Pt would benefit from continued skilled PT to address deficits in weakness and activity tolerance to maximize indep with functional mobility.        Entered by: Jeanine Way 11/03/2017 9:07 AM             "

## 2017-11-03 NOTE — CONSULTS
"[facility]   PM&R CONSULT    Consulting Provider: Twila/Lisa  Reason for Consult: Assessment of rehabilitation   Location of Patient: 6A  Date of Encounter: 11/3/2017   Date of Admission: 10/31/2017        ASSESSMENT/RECOMMENDATIONS:    Ms. Marylee Woods is a right handed  62 year old yo female with a past medical history significant for MS, pancytopenia (thought to be medication related), GERD,  who presented with generalized weakness (LE>UE), confusion, and increased spasm in the setting of a B hemolytic strep infection being treated with Augmentin and with no new enhancing lesions on brain or cervical MRI.  She appears to have had a slow decline in function while at home over an extended period of time exacerbated by this recent infection.  She currently requires significant assistance for transfers, mobility and ADLs. Anticipate that she will have a more prolonged recovery period and would have difficulty tolerating the intensity of an ARU stay.  She is not safe to discharge home given the level of assistance she requires and lack of available support while  is at work.  Recommend TCU when medically stable for discharge.    HPI:    Patient is a  61 yo female with a past medical history significant for MS, pancytopenia (thought to be medication related), GERD,  who presented on 11/1 with symptoms of increased difficulty with transfers, increased spasms, and confusion.  She had been treated earlier in the monthwith a \"dose pack\" for another exacerbation that she states presented with similar LE weakness and blurry vision.  After completing her dose pack, she felt back to baseline for several days, then on 10/28 developed generalized weakness severe enough that she was unable to transfer, and had one fall during a transfer, as well as increased spasms primarily of LE and confusion/somnolence.  During her fall she injured her left leg and back, and reportedly had XRs " "at [facility] which were negative.     Symptoms were presumed to be due to a MS exacerbation.  MRI of brain and cervical spine did not show any enhancing lesion and throat culture showed B hemolytic strep and she was started on amoxicillin.  UCx and CXR were negative. She is receiving IV solumedrol (started 11/1) with plans for additional dosing tomorrow.  She reports noticing some improvement with the steroids, but continues to be worse than her baseline in terms of weakness, frequency of spasms, and headaches.         At baseline, her MS causes significant mobility impairment, spasticity, and neurogenic bladder.  She is on baclofen 20 mg BID for spasticity and oxybutynin for neurogenic bladder with reasonable control of her symptoms at baseline.          PREVIOUS LEVEL OF FUNCTION    She primarily used a wheelchair for mobility and would use a walker occasionally to walk short distances (~25 ft) if her  was available to walk with her when she used the walker due to fear of falling/poor balance.  She reports that she has been using the wheelchair more and more over time as she has been \"being lazy at home\" and not keeping up with her maintenance exercises.  She could transfers in/out of the wheelchair independently.  She was independent with ADLs at times, but on days when she felt fatigued or was feeling \"lazy\" she would require assistance (though she reported to PT that she had been needing bed baths).  Managed medications independently.   manages finances.        CURRENT FUNCTION   PT: rolling in bed with min A and strong use of UE on bedrail. Performed supine>sit with mod A x 2 for management of trunk and LEs with HOB slightly elevated. Pt requiring min A for sitting balance at EOB secondary to posterior lean. She performed stand-pivot transfer from bed>wc with min-mod A x 2. Significantly dec LE strength L>R. Pt also co of \"tingling\" below knee on L LE and above knee on R LE. Recommending " TCU.  OT: Consult not placed.  SLP: Consult not placed.     LIVING SITUATION/SUPPORT  Patient lives in [place] with her , Manuel, and a roommate, Chiki who lives upstairs.   works full time.  There are no other support people besides her  available to provide any additional care upon discharge.  Their house has a ramp to enter and then it is 2 levels with main bedroom and full bath on main level.      SOCIAL HISTORY  Social History     Social History     Marital status:      Spouse name: N/A     Number of children: N/A     Years of education: N/A     Social History Main Topics     Smoking status: Light Tobacco Smoker     Packs/day: 0.50     Types: Cigarettes     Smokeless tobacco: Never Used     Alcohol use 0.0 oz/week     0 Standard drinks or equivalent per week      Comment: 2/month     Drug use: No     Sexual activity: Yes     Partners: Male     Other Topics Concern     None     Social History Narrative                   Past Medical History:  Past Medical History:   Diagnosis Date     Gastro-oesophageal reflux disease      Multiple sclerosis (H)            Current Medications:  Current Facility-Administered Medications   Medication     [START ON 11/4/2017] methylPREDNISolone sodium succinate (solu-MEDROL) 1,000 mg in NaCl 0.9 % 250 mL intermittent infusion     amoxicillin (AMOXIL) capsule 500 mg     gabapentin (NEURONTIN) tablet 600 mg     gabapentin (NEURONTIN) capsule 900 mg     0.9% sodium chloride infusion     potassium chloride SA (K-DUR/KLOR-CON M) CR tablet 20-40 mEq     potassium chloride (KLOR-CON) Packet 20-40 mEq     potassium chloride 10 mEq in 100 mL sterile water intermittent infusion (premix)     potassium chloride 10 mEq in 100 mL intermittent infusion with 10 mg lidocaine     magnesium sulfate 4 g in 100 mL sterile water (premade)     acetaminophen (TYLENOL) tablet 650 mg     senna-docusate (SENOKOT-S;PERICOLACE) 8.6-50 MG per tablet 1 tablet    Or      senna-docusate (SENOKOT-S;PERICOLACE) 8.6-50 MG per tablet 2 tablet     magnesium hydroxide (MILK OF MAGNESIA) suspension 30 mL     sodium phosphate (FLEET ENEMA) 1 enema     ondansetron (ZOFRAN-ODT) ODT tab 4 mg    Or     ondansetron (ZOFRAN) injection 4 mg     teriflunomide (AUBAGIO) tablet 14 mg     baclofen (LIORESAL) tablet 10 mg     cholecalciferol (vitamin D3) capsule CAPS 5,000 Units     hydrochlorothiazide tablet 12.5 mg     sertraline (ZOLOFT) tablet 150 mg     oxybutynin (DITROPAN-XL) 24 hr tablet 10 mg     omeprazole (priLOSEC) CR capsule 40 mg     nitroFURantoin (macrocrystal-monohydrate) (MACROBID) capsule 100 mg     LORazepam (ATIVAN) tablet 1 mg     ibuprofen (ADVIL/MOTRIN) tablet 200-600 mg         Review of Systems:  Total of ten systems reviewed, pertinent positives and negatives as follows  Instability with standing and walking.    Feels generally fatigued  Reports generalized weakness.  Reports paresthesias of left distal leg.  Neurogenic bladder- symptoms at baseline.  No dysuria, abdominal pain, nausea.   No issues with bowels.  No chest pain. No cough or SOB.  Blurry vision.  Positive for headaches.  No photophobia.   No nausea, or abdominal pain.  Slept poorly last night  Back and left LE pain since fall.  Mood is good, denies any symptoms of depression.   Remainder of the review of the systems was negative.      Labs   Lab Results   Component Value Date    WBC 3.5 (L) 11/02/2017    HGB 9.9 (L) 11/02/2017    HCT 30.5 (L) 11/02/2017    MCV 87 11/02/2017     (L) 11/02/2017     Lab Results   Component Value Date     11/02/2017    POTASSIUM 3.9 11/02/2017    CHLORIDE 112 (H) 11/02/2017    CO2 25 11/02/2017     (H) 11/02/2017     Lab Results   Component Value Date    GFRESTIMATED 61 11/02/2017    GFRESTBLACK 73 11/02/2017     Lab Results   Component Value Date    AST 18 11/01/2017    ALT 15 11/01/2017    ALKPHOS 77 11/01/2017    BILITOTAL 0.5 11/01/2017    BILICONJ 0.0  11/11/2011    ALINA 12 11/11/2011     Lab Results   Component Value Date    INR 1.61 (H) 11/11/2011     Lab Results   Component Value Date    BUN 19 11/02/2017    CR 0.93 11/02/2017         ON EXAMINATION:  Vitals:    11/02/17 2300 11/03/17 0725 11/03/17 1000 11/03/17 1201   BP: 143/75 149/72  147/74   BP Location: Right arm Right arm  Right arm   Pulse:    82   Resp: 18 14  19   Temp: 96.3  F (35.7  C) 98.9  F (37.2  C)  96.7  F (35.9  C)   TempSrc: Oral Oral  Oral   SpO2: 95% 95% 100% 98%       Physical Exam:  Blood pressure 147/74, pulse 82, temperature 96.7  F (35.9  C), temperature source Oral, resp. rate 19, SpO2 98 %, not currently breastfeeding.    GEN: NAD, lying comfortably in bed.  She is alert, appropriate, cooperative  Speech is normal  Comprehension is intact  HEENT: NCAT  RESPIRATORY: Unlabored breathing  MSK: full passive ROM at all major joints of the bilaterally upper and lower extremities  No muscle atrophy noted  ABD: soft, non tender, pos BS  NEURO:   CRANIAL NERVES: Pupils equal, round. Extraocular movements full. Visual fields full. Face moves symmetrically. Tongue midline. Hearing intact to finger rubbing.    Sensation: sensation to light touch intact except for left lower extremity distally   Strength: deltoid 3 on R, 4 on L with giveway weakness, biceps/triceps 5 bilaterally, finger  and interossei 4 bilaterally, hip flexors and quads 2 bilaterally, dorsi and plantarflexors 4   Cognition: fund of knowledge and train of thought appropriate   Coordination: Dysmetria with finger-to-nose and FFM on left, no dysmetria on right.    Tone: No spasticity or clonus.   No spasms noted during H&P.  SKIN: no rashes or lesions noted.  Patient has PIV.  EXT: No edema.  Wearing compression stockings.  PSYCH: normal affect.  Mood is baseline with no signs of depression    Patient discussed with Dr. Gonzalez.      Rita COLON, personally examined and evaluated this patient on 11/3/17. DARLENE  discussed the patient with my resident Dr. Mattson and care team, and agree with the assessment and plan of care as documented in her note.    I personally reviewed the chart (vitals signs, medications, labs and imaging).     My key findings and key management decisions made by me: Marylee Woods is known to rehabilitation services from her previous stay in ARU in 05/2013 for tib/fib fracture. Since then, it appears she has had a gradual decline in function. At time of discharge from ARU, was ambulating with a walker. Is now primarily using a manual wheelchair and relies on significant other for assistance with some ADLs and most IADLs. She does have significant rehab needs at this time due to exacerbation of MS symptoms from current infection. Currently requiring significant assistance for transfers, mobility and ADLs. Anticipate that she will have a more prolonged recovery period and would have difficulty tolerating the intensity of an ARU stay.  She is not safe to discharge home given the level of assistance she requires and lack of available support while  is at work.  Recommend TCU when medically stable for discharge.         I spent a total of 70 minutes face-to-face and managing the care of Marylee Woods. Over 50% of my time on the unit was spent counseling the patient and coordinating care. See note for details.

## 2017-11-03 NOTE — PROGRESS NOTES
11/03/17 0800   Quick Adds   Type of Visit Initial PT Evaluation  (Jeanine Way, PT, DPT )       Present no   Language english    Living Environment   Lives With spouse   Living Arrangements house  (2 story home with basement )   Home Accessibility stairs within home;ramps present at home;bed and bath on same level   Number of Stairs to Enter Home 0   Number of Stairs Within Home 12   Stair Railings at Home inside, present on left side   Transportation Available family or friend will provide;other (see comments)  (metro if SO is unable to provide )   Living Environment Comment Pt lives in 2 story home with SO. Has full flight of stairs up and down, though bedroom and BR is on main level. SO works FT.    Self-Care   Dominant Hand right   Usual Activity Tolerance moderate   Current Activity Tolerance poor   Regular Exercise yes   Activity/Exercise Type other (see comments)  (OT 2x/week )   Exercise Amount/Frequency 2 times/wk;greater than 1 hr;1 hr   Equipment Currently Used at Home wheelchair, manual;walker, standard;grab bar;raised toilet  (diogoer, )   Activity/Exercise/Self-Care Comment Pt has wc and FWW to use at home. Pt reporting having taken bed baths at home. She notes she was able to transfer form wc to toilet, but at times taking her >30 mins. Was going to OP OT 2x/week. Reporting she plans to start attending MS support group as well.    Functional Level Prior   Ambulation 3-->assistive equipment and person   Transferring 1-->assistive equipment   Toileting 3-->assistive equipment and person   Bathing 1-->assistive equipment   Dressing 0-->independent   Eating 0-->independent   Communication 0-->understands/communicates without difficulty   Swallowing 0-->swallows foods/liquids without difficulty   Cognition 1 - attention or memory deficits   Fall history within last six months yes   Number of times patient has fallen within last six months 1   Which of the above functional risks had  a recent onset or change? ambulation;transferring;fall history   Prior Functional Level Comment Uses FWW for short distance ambulation, reporting ~25' with therapy as longest distance. Most of time spent in wc.    General Information   Onset of Illness/Injury or Date of Surgery - Date 10/31/17   Referring Physician Yair De Leon MD   Patient/Family Goals Statement inc strength    Pertinent History of Current Problem (include personal factors and/or comorbidities that impact the POC) 62 year old female with h/o MS who presents in presumed acute exacerbation of MS. MRI brain and c-spine completed without enhancing lesions. Started on 3 day course of solumedrol 1g qd 11/1.   Precautions/Limitations fall precautions   Weight-Bearing Status - LUE full weight-bearing   Weight-Bearing Status - RUE full weight-bearing   Weight-Bearing Status - LLE full weight-bearing   Weight-Bearing Status - RLE full weight-bearing   General Info Comments activity: up with A   Cognitive Status Examination   Orientation orientation to person, place and time   Level of Consciousness alert   Follows Commands and Answers Questions 100% of the time   Personal Safety and Judgment intact   Memory intact   Pain Assessment   Patient Currently in Pain Yes, see Vital Sign flowsheet  (HA)   Integumentary/Edema   Integumentary/Edema Comments Pt wearing personal joselyn hose, though some swelling noted in L LE    Posture    Posture Comments no deficits ID, did not obtain full standing during session    Range of Motion (ROM)   ROM Quick Adds No deficits were identified   ROM Comment limited AROM in LE secondary to weakness, PROM WFL    Strength   Strength Comments UE grossly 4-5/5, R LE 3/5 L LE <3/5 grossly    Bed Mobility   Bed Mobility Comments able to initiate rolling and supine>sit, requiring min A x 2 for management of trunk and LEs.    Transfer Skills   Transfer Comments Performed SPT from bed>wc with mod A x 2 and sejal knee blocking    Gait  "  Gait Comments unable today    Balance   Balance Comments Pt with posterior LOB in sitting at EOB, provided CGA-min A for support    Sensory Examination   Sensory Perception Comments Pt reporting tingling below knee on L LE and above knee on R LE    Coordination   Coordination no deficits were identified   Muscle Tone   Muscle Tone no deficits were identified   General Therapy Interventions   Planned Therapy Interventions balance training;gait training;strengthening;transfer training;home program guidelines;progressive activity/exercise   Clinical Impression   Criteria for Skilled Therapeutic Intervention yes, treatment indicated   PT Diagnosis Dec functional mobility    Influenced by the following impairments weakness, dec activity tolerance    Functional limitations due to impairments bed mobility, transfers, gait, balance,    Clinical Presentation Evolving/Changing   Clinical Presentation Rationale PMH, dx, functional mobility, clinical judgement    Clinical Decision Making (Complexity) Low complexity   Therapy Frequency` daily   Predicted Duration of Therapy Intervention (days/wks) 11/10/17   Anticipated Equipment Needs at Discharge (TBD)   Anticipated Discharge Disposition Transitional Care Facility   Risk & Benefits of therapy have been explained Yes   Patient, Family & other staff in agreement with plan of care Yes   Clinical Impression Comments PT eval completed, tx indicated    [facility] [employer] TM \"6 Clicks\"   2016, Trustees of [facility], under license to [employer].  All rights reserved.   6 Clicks Short Forms Basic Mobility Inpatient Short Form   [facility] AM-PAC  \"6 Clicks\" V.2 Basic Mobility Inpatient Short Form   1. Turning from your back to your side while in a flat bed without using bedrails? 3 - A Little   2. Moving from lying on your back to sitting on the side of a flat bed without using bedrails? 3 - A Little   3. Moving to and from a bed to a chair (including a " wheelchair)? 2 - A Lot   4. Standing up from a chair using your arms (e.g., wheelchair, or bedside chair)? 2 - A Lot   5. To walk in hospital room? 1 - Total   6. Climbing 3-5 steps with a railing? 1 - Total   Basic Mobility Raw Score (Score out of 24.Lower scores equate to lower levels of function) 12   Total Evaluation Time   Total Evaluation Time (Minutes) 7

## 2017-11-03 NOTE — PLAN OF CARE
Problem: Patient Care Overview  Goal: Plan of Care/Patient Progress Review  Outcome: No Change  Pt here for MS exacerbation. VSS. A&Ox4 but forgetful. Neuros unchanged w/ BUE 4/5 (R>L, baseline per pt), RLE 3/5 and LLE 2/5 and numbness/tingling to BUE. Given ibuprofen x1 for HA. On regular diet, able to feed self now. PIV SL. Incontinent of urine. Turn q2hrs. Continue with POC.

## 2017-11-03 NOTE — PROGRESS NOTES
[facility], [place]   Neurology Progress Note  11/2/2017    Summary: Marylee Woods is a 62 year old female with h/o MS who presents in presumed acute exacerbation of MS. MRI brain and c-spine completed without enhancing lesions. Started on 3 day course of solumedrol 1g qd 11/1. Throat culture positive for non-group A strep.     Subjective:    No acute events overnight. Prior to PT patient feeling well, no complaints. Seen after PT and patient is fatigued and complaining of headache.     Objective:    Vitals: /72 (BP Location: Right arm)  Temp 98.9  F (37.2  C) (Oral)  Resp 14  LMP  (LMP Unknown)  SpO2 100%  General: NAD  HEENT: sclera anicteric  Cardiac: RRR  Chest: No respiratory distress  Extremities: No LE edema  Neurologic:   Mental Status: Fully alert, attentive and oriented. Speech soft but clear.  Cranial Nerves: PERRL. EOMI. Facial movements symmetric. Hearing intact to conversation. Palate elevation symmetric, uvula midline. No dysarthria.   Motor: Antigravity in UE bilaterally, strength in biceps, brachioradialis is 4+/5 on R, 4/5 on L. Hip flexors 3/5 on R, 2/5 on L; dorsiflexors/plantarflexors are 4+/5 bilaterally  Reflexes: 3+ and symmetric in UE/LE  Sensory: Decreased light touch sensation on LUE/LLE  Coordination: FNF intact  Station/Gait: Deferred      Pertinent Investigations:    MRI c-spine 11/1  Impression:   1. Few scattered areas of T2 hyperintensities in the cervical spine,  as detailed above, with no definite evidence of corresponding  enhancement. Findings likely related to demyelination foci within the  cervical spine with no definite evidence of active disease. Difficult  to evaluate for progression given the limited prior exams due to  motion artifact.  2. Mild multilevel degenerative changes of the cervical spine.      MRI brain 11/1  Impression: The study demonstrates numerous foci of T2-hyperintensity  within the cerebral white matter consistent  with the clinical  suspicion of demyelinating disease. There no abnormal enhancement  noted . There are likely increased number of lesions, but direct  comparison is difficult due to motion, especially on the prior study  and different imaging planes compared to prior study.     Assessment/Plan:   # Multiple sclerosis, acute exacerbation:  No clear infectious source causing exacerbation. CXR and UA negative in ED. Patient does have L ear pain, although these were normal appearing in the ED. MRI without enhancing lesions. Completed 3 day course of steroids, however patient still feels she is not back to baseline. Will give another dose of high dose steroids tomorrow.   -Continue home aubagio 14mg qd  -Finished 3 day course of methylprednisolone 1g IV; will give one further dose tomorrow morning  -Continue home baclofen 20mg BID for spasticity  -Continue vitamin D3 supplementation  -Continue home omeprazole 40mg qd while on steroids     # Pancytopenia:  Patient has been worked up as OP at heme/onc. This is thought to be due to medication side effect from MS drugs or nitrofurantoin. Currently stable.  -CBC daily    # Strep pharyngitis:  Throat culture came back positive with non-group A strep. Patient asymptomatic although was complaining of ear pain on the L on admission.  -Amoxicillin 500mg BID x10 days    # Painful paresthesias:  -Continue home gabapentin 600-900mg     # Mood disorder:  -Continue home sertraline 150mg qd     # Sleep disorder:  -Continue home ativan 1mg qhs prn     # Neurogenic bladder:  -Continue home oxybutynin 20mg qd, nitrofurantoin 100mg daily     # HTN:  -Continue home HCTZ 12.5mg qd     Code: FULL  FEN: Regular adult diet  Px: heparin SQ     Dispo: another dose of IV steroids tomorrow, PT rec's TCU placement, SW assisting with placement      Patient seen and discussed with attending physician Dr. Brenner.    Arsh De Leon  Neurology PGY-2

## 2017-11-03 NOTE — PLAN OF CARE
Problem: Patient Care Overview  Goal: Plan of Care/Patient Progress Review  Pt A&Ox4 but forgetful, VSS. Pt on CPAP most of night, 2L NC put on. Pt c/o HA, PRN Tylenol given for relief. Neuros unchanged with BUE 4/5 (R>L, per pt baseline), RLE 3/5 and LLE 2/5, was unable to lift legs in bed but able to slightly flex feet. Numbness and tingling in BLE @ baseline, Assist of 2. Lift. Incont of urine. No BM overnight. Refused Q2 turning. PIV SL. Bed alarm activated. Pneumonic compression devices on. Pt calling appropriately and making needs known. Will continue to monitor and follow POC.

## 2017-11-04 ENCOUNTER — APPOINTMENT (OUTPATIENT)
Dept: PHYSICAL THERAPY | Facility: CLINIC | Age: 62
End: 2017-11-04
Payer: COMMERCIAL

## 2017-11-04 LAB
BACTERIA SPEC CULT: ABNORMAL
ERYTHROCYTE [DISTWIDTH] IN BLOOD BY AUTOMATED COUNT: 13.9 % (ref 10–15)
HCT VFR BLD AUTO: 28.8 % (ref 35–47)
HGB BLD-MCNC: 9.2 G/DL (ref 11.7–15.7)
Lab: ABNORMAL
MCH RBC QN AUTO: 28.1 PG (ref 26.5–33)
MCHC RBC AUTO-ENTMCNC: 31.9 G/DL (ref 31.5–36.5)
MCV RBC AUTO: 88 FL (ref 78–100)
PLATELET # BLD AUTO: 78 10E9/L (ref 150–450)
RBC # BLD AUTO: 3.27 10E12/L (ref 3.8–5.2)
SPECIMEN SOURCE: ABNORMAL
WBC # BLD AUTO: 5.4 10E9/L (ref 4–11)

## 2017-11-04 PROCEDURE — 40000275 ZZH STATISTIC RCP TIME EA 10 MIN

## 2017-11-04 PROCEDURE — 97530 THERAPEUTIC ACTIVITIES: CPT | Mod: GP

## 2017-11-04 PROCEDURE — 25000132 ZZH RX MED GY IP 250 OP 250 PS 637: Performed by: PSYCHIATRY & NEUROLOGY

## 2017-11-04 PROCEDURE — 85027 COMPLETE CBC AUTOMATED: CPT | Performed by: STUDENT IN AN ORGANIZED HEALTH CARE EDUCATION/TRAINING PROGRAM

## 2017-11-04 PROCEDURE — 12000008 ZZH R&B INTERMEDIATE UMMC

## 2017-11-04 PROCEDURE — 40000193 ZZH STATISTIC PT WARD VISIT

## 2017-11-04 PROCEDURE — 25000132 ZZH RX MED GY IP 250 OP 250 PS 637: Performed by: STUDENT IN AN ORGANIZED HEALTH CARE EDUCATION/TRAINING PROGRAM

## 2017-11-04 PROCEDURE — 97110 THERAPEUTIC EXERCISES: CPT | Mod: GP

## 2017-11-04 PROCEDURE — 25000128 H RX IP 250 OP 636: Performed by: STUDENT IN AN ORGANIZED HEALTH CARE EDUCATION/TRAINING PROGRAM

## 2017-11-04 PROCEDURE — 94660 CPAP INITIATION&MGMT: CPT

## 2017-11-04 PROCEDURE — 36415 COLL VENOUS BLD VENIPUNCTURE: CPT | Performed by: STUDENT IN AN ORGANIZED HEALTH CARE EDUCATION/TRAINING PROGRAM

## 2017-11-04 RX ADMIN — BACLOFEN 10 MG: 10 TABLET ORAL at 08:24

## 2017-11-04 RX ADMIN — GABAPENTIN 900 MG: 300 CAPSULE ORAL at 20:47

## 2017-11-04 RX ADMIN — GABAPENTIN 600 MG: 600 TABLET, FILM COATED ORAL at 08:24

## 2017-11-04 RX ADMIN — AMOXICILLIN 500 MG: 500 CAPSULE ORAL at 20:36

## 2017-11-04 RX ADMIN — LORAZEPAM 1 MG: 1 TABLET ORAL at 20:46

## 2017-11-04 RX ADMIN — TERIFLUNOMIDE 14 MG: 14 TABLET, FILM COATED ORAL at 08:25

## 2017-11-04 RX ADMIN — CHOLECALCIFEROL CAP 125 MCG (5000 UNIT) 5000 UNITS: 125 CAP at 08:24

## 2017-11-04 RX ADMIN — ACETAMINOPHEN 650 MG: 325 TABLET, FILM COATED ORAL at 09:17

## 2017-11-04 RX ADMIN — BACLOFEN 10 MG: 10 TABLET ORAL at 20:35

## 2017-11-04 RX ADMIN — SODIUM CHLORIDE 1000 MG: 9 INJECTION, SOLUTION INTRAVENOUS at 08:17

## 2017-11-04 RX ADMIN — NITROFURANTOIN (MONOHYDRATE/MACROCRYSTALS) 100 MG: 75; 25 CAPSULE ORAL at 08:24

## 2017-11-04 RX ADMIN — HYDROCHLOROTHIAZIDE 12.5 MG: 12.5 TABLET ORAL at 08:24

## 2017-11-04 RX ADMIN — SERTRALINE HYDROCHLORIDE 150 MG: 50 TABLET ORAL at 20:45

## 2017-11-04 RX ADMIN — OXYBUTYNIN CHLORIDE 10 MG: 10 TABLET, EXTENDED RELEASE ORAL at 08:24

## 2017-11-04 RX ADMIN — OXYBUTYNIN CHLORIDE 10 MG: 10 TABLET, EXTENDED RELEASE ORAL at 20:36

## 2017-11-04 RX ADMIN — IBUPROFEN 600 MG: 200 TABLET, FILM COATED ORAL at 09:17

## 2017-11-04 RX ADMIN — ACETAMINOPHEN 650 MG: 325 TABLET, FILM COATED ORAL at 20:45

## 2017-11-04 RX ADMIN — OMEPRAZOLE 40 MG: 20 CAPSULE, DELAYED RELEASE ORAL at 08:24

## 2017-11-04 RX ADMIN — AMOXICILLIN 500 MG: 500 CAPSULE ORAL at 08:25

## 2017-11-04 ASSESSMENT — PAIN DESCRIPTION - DESCRIPTORS: DESCRIPTORS: ACHING

## 2017-11-04 ASSESSMENT — VISUAL ACUITY
OU: GLASSES
OU: BASELINE;GLASSES
OU: GLASSES

## 2017-11-04 NOTE — PROGRESS NOTES
[facility], [place]   Neurology Progress Note  11/2/2017    Summary: Marylee Woods is a 62 year old female with h/o MS who presents in presumed acute exacerbation of MS. MRI brain and c-spine completed without enhancing lesions. Started on 5 day course of solumedrol 1g qd 11/1. Throat culture positive for non-group A strep.     Subjective:    No acute events overnight. Patient continues to have headache. No other acute concerns.     Objective:    Vitals: /71  Pulse 76  Temp 96.5  F (35.8  C) (Axillary)  Resp 18  LMP  (LMP Unknown)  SpO2 98%  General: NAD  HEENT: sclera anicteric  Cardiac: RRR  Chest: No respiratory distress  Extremities: No LE edema  Neurologic:   Mental Status: Fully alert, attentive and oriented. Speech soft but clear.  Cranial Nerves: PERRL. EOMI. Facial movements symmetric. Hearing intact to conversation. Palate elevation symmetric, uvula midline. No dysarthria.   Motor: Antigravity in UE bilaterally, strength in biceps, brachioradialis is 4+/5 on R, 4/5 on L. Hip flexors 3/5 on R, 2/5 on L; dorsiflexors/plantarflexors are 4+/5 bilaterally  Reflexes: 3+ and symmetric in UE/LE  Sensory: Decreased light touch sensation on LUE/LLE  Coordination: FNF intact  Station/Gait: Deferred      Pertinent Investigations:         Lab Results   Component Value Date     11/02/2017    Lab Results   Component Value Date    CHLORIDE 112 11/02/2017    Lab Results   Component Value Date    BUN 19 11/02/2017      Lab Results   Component Value Date    POTASSIUM 3.9 11/02/2017    Lab Results   Component Value Date    CO2 25 11/02/2017    Lab Results   Component Value Date    CR 0.93 11/02/2017        Lab Results   Component Value Date    WBC 5.4 11/04/2017    HGB 9.2 (L) 11/04/2017    HCT 28.8 (L) 11/04/2017    MCV 88 11/04/2017    PLT 78 (L) 11/04/2017         Assessment/Plan:   # Multiple sclerosis, acute exacerbation:  No clear infectious source causing exacerbation. CXR  and UA negative in ED. Patient does have L ear pain, although these were normal appearing in the ED. MRI without enhancing lesions. Patient continues to relay that she is not back to baseline. Will finish 5 day course of methylpred (ending 11/5).  -Continue home aubagio 14mg qd  -Will finish 5 day course of IV methylpred on 11/5  -Continue home baclofen 20mg BID for spasticity  -Continue vitamin D3 supplementation  -Continue home omeprazole 40mg qd while on steroids     # Pancytopenia:  Patient has been worked up as OP at heme/onc. This is thought to be due to medication side effect from MS drugs or nitrofurantoin. Currently stable.  -CBC daily    # Strep pharyngitis:  Throat culture came back positive with non-group A strep. Patient asymptomatic although was complaining of ear pain on the L on admission.  -Amoxicillin 500mg BID x10 days    # Painful paresthesias:  -Continue home gabapentin 600-900mg     # Mood disorder:  -Continue home sertraline 150mg qd     # Sleep disorder:  -Continue home ativan 1mg qhs prn     # Neurogenic bladder:  -Continue home oxybutynin 20mg qd, nitrofurantoin 100mg daily     # HTN:  -Continue home HCTZ 12.5mg qd     Code: FULL  FEN: Regular adult diet  Px: heparin SQ     Dispo: PT rec's TCU placement medically stable for discharge on 11/5. SW assisting with placement      Patient seen and discussed with attending physician Dr. Brenner.    Tacos Frazier MD  Neurology resident, PGY-2  (P) [phone]

## 2017-11-04 NOTE — PROGRESS NOTES
"SPIRITUAL HEALTH SERVICES  SPIRITUAL ASSESSMENT Progress Note  [facility] ([place] 7B    REFERRAL SOURCE: Follow up from on-call's previous visit 11/2.     Marylee was eating breakfast at the time of my visit. Reflective conversation, touching on health narrative, particularly around her experience of MS. Marylee described the present illness as, \"This is big.\" Explored health narrative in context of Marylee's [employer] luis alfredo, and her desire for God's nearness, \"I imagine God to have all our names on cards around him. Mine is way back in a corner.\"    We chose to discontinue the visit when Marylee's doctor arrived. Marylee asked for a bible.    PLAN: I will deliver a bible to Marylee before the end of my shift today. I will follow up for prayer and emotional support once per week as she remains on 6A.                                                                                                                                           Sea Arellano   Intern  Pager [phone]    "

## 2017-11-04 NOTE — PLAN OF CARE
Problem: Patient Care Overview  Goal: Plan of Care/Patient Progress Review  PT 6A:  Discharge Planner PT   Patient plan for discharge: rehab  Current status: Max A x2 sit<>stand, unsafe.  Dependent for transfer bed<>chair.  Stood in EZStand x4mins today with good tolerance.  Barriers to return to prior living situation: Significantly below baseline LE strength and transfer abilities   Recommendations for discharge: TCU  Rationale for recommendations: Pt dependent on caregivers for all mobility at this time.  Has potential to return home after transfer training and LE strengthening in TCU setting       Entered by: Helen Webber 11/04/2017 4:32 PM

## 2017-11-04 NOTE — PROGRESS NOTES
Social Work: Assessment with Discharge Plan    Patient Name:  Marylee Woods  :  1955  Age:  62 year old  MRN:  [id number]  Risk/Complexity Score:  Filed Complexity Screen Score: 6  Completed assessment with:  Pt    Presenting Information   Reason for Referral:  Discharge plan  Date of Intake:  2017  Referral Source:  Chart Review  Decision Maker:  Pt when able  Alternate Decision Maker:  TOMASZK policy  - Spouse Manuel  Health Care Directive:  Patient considering completing and Provided education  Living Situation:  House  Previous Functional Status:  Independent  Patient and family understanding of hospitalization:  Pt states that she feels she needs to go to TCU as her  works full time during the week.  Cultural/Language/Spiritual Considerations:  Pt is Orthodox.  Adjustment to Illness:  Pt adjusting appropriately in bed today.    Physical Health  Reason for Admission:    1. Confusion    2. Generalized muscle weakness    3. MS (multiple sclerosis) (H)      Services Needed/Recommended:  TCU    Mental Health/Chemical Dependency  Diagnosis:  None reported  Support/Services in Place:  None reported yet by team  Services Needed/Recommended:  Will follow for any needs.    Support System  Significant relationship at present time:  Spouse Manuel  Family of origin is available for support:  Yes  Other support available:  Yes  Gaps in support system:  Spouse works full time and pt feels she would benefit from rehab to get stronger before going home.  Patient is caregiver to:  None     Provider Information   Primary Care Physician:  Carolina Pulliam   [phone]   Clinic:  [address] [place] [id number]      :  None     Financial   Income Source:  Pt reports she is on medical disability  Financial Concerns:  None reported today.  SW provided education on TCU coverage.  Pt aware with BCBS primary, pt will need prior auth for TCU placement.  This can be started Monday as BCBS is not  open over weekend.  Insurance:    Payor/Plan Subscriber Name Rel Member # Group #   BCBS - [employer] [place]  [id number]      [address] [address]   MEDICARE - MEDICARE P* WOODS,MARYLEE  [id number]       ATTN CLAIMS, [address]       Discharge Plan   Patient and family discharge goal:  TCU  Provided education on discharge plan:  YES  Patient agreeable to discharge plan:  YES  A list of Medicare Certified Facilities was provided to the patient and/or family to encourage patient choice. Patient's choices for facility are:    [facility]  PH: *[phone]    Will NH provide Skilled rehabilitation or complex medical:  YES  General information regarding anticipated insurance coverage and possible out of pocket cost was discussed. Patient and patient's family are aware patient may incur the cost of transportation to the facility, pending insurance payment: YES  Barriers to discharge:  Medical stability, insurance auth Monday.    Discharge Recommendations   Anticipated Disposition:  Facility:  TCU  Transportation Needs:  Medical:  Wheelchair  Name of Transportation Company and Phone:  [employer] PH: [phone] if going to [facility].    Additional comments   SW met with pt to introduce self and role in consult.  Pt states she is very familiar with [facility] from previous rehab needs.  Pt states this is where she would prefer to continue her care post hospitalization.  Pt states her  is involved as able, however  is working full time and cannot be at home during the day to provide support.  SW provided education around insurance coverage and prior auth needs.  SW also started education around a HCD and pt will consider completing.  Monday SW to follow up with [facility] on prior auth status and discharge planning.  SW to start referral today so auth can be submitted Monday.    LAKHWINDER Johnson, APSW  Weekend Adult Acute Care   Pager [phone]  Care Management Dept [phone]

## 2017-11-04 NOTE — PLAN OF CARE
Problem: Patient Care Overview  Goal: Plan of Care/Patient Progress Review  Outcome: No Change  Pt here for MS exacerbation. VSS. A&Ox4 but forgetful. Neuros unchanged w/ BUE 4/5 (R>L, baseline per pt), RLE 3/5 and LLE 2/5 and numbness/tingling to BLE. Pt c/o HA, some relief with prn Tylenol and ibuprofen. On regular diet, able to feed self now. PIV TKO. Turn q2hrs. Up w/ A2 and lift. Pt normally incontinent of urine--had to be straight cathed this shift. Straight cathed at 2230 for 400ccs. Small BM. Will receive one more dose of methylpred tomorrow. Continue with POC.

## 2017-11-04 NOTE — PLAN OF CARE
Problem: Patient Care Overview  Goal: Individualization & Mutuality  Outcome: No Change  Pt admitted with MS exacerbation. VSS. A&Ox4 but forgetful at times. Neuros unchanged with  BUE 4/5, BLE 2/5 and numbness/tingling to BLE. On regular diet. Turn q2hrs. Up w/ A2 and lift during the day. Baseline urinary incontinence.   Plan: Continue with POC.

## 2017-11-05 ENCOUNTER — APPOINTMENT (OUTPATIENT)
Dept: PHYSICAL THERAPY | Facility: CLINIC | Age: 62
End: 2017-11-05
Payer: COMMERCIAL

## 2017-11-05 VITALS
BODY MASS INDEX: 21.62 KG/M2 | RESPIRATION RATE: 16 BRPM | TEMPERATURE: 96.4 F | WEIGHT: 142.2 LBS | HEART RATE: 76 BPM | OXYGEN SATURATION: 98 % | DIASTOLIC BLOOD PRESSURE: 58 MMHG | SYSTOLIC BLOOD PRESSURE: 141 MMHG

## 2017-11-05 PROCEDURE — 12000008 ZZH R&B INTERMEDIATE UMMC

## 2017-11-05 PROCEDURE — 25000132 ZZH RX MED GY IP 250 OP 250 PS 637: Performed by: STUDENT IN AN ORGANIZED HEALTH CARE EDUCATION/TRAINING PROGRAM

## 2017-11-05 PROCEDURE — 25000125 ZZHC RX 250: Performed by: STUDENT IN AN ORGANIZED HEALTH CARE EDUCATION/TRAINING PROGRAM

## 2017-11-05 PROCEDURE — 25000128 H RX IP 250 OP 636: Performed by: STUDENT IN AN ORGANIZED HEALTH CARE EDUCATION/TRAINING PROGRAM

## 2017-11-05 PROCEDURE — 97110 THERAPEUTIC EXERCISES: CPT | Mod: GP

## 2017-11-05 PROCEDURE — 25000132 ZZH RX MED GY IP 250 OP 250 PS 637: Performed by: PSYCHIATRY & NEUROLOGY

## 2017-11-05 PROCEDURE — 40000193 ZZH STATISTIC PT WARD VISIT

## 2017-11-05 PROCEDURE — 97530 THERAPEUTIC ACTIVITIES: CPT | Mod: GP

## 2017-11-05 RX ORDER — BACLOFEN 10 MG/1
20 TABLET ORAL 2 TIMES DAILY
Status: DISCONTINUED | OUTPATIENT
Start: 2017-11-05 | End: 2017-11-06 | Stop reason: HOSPADM

## 2017-11-05 RX ADMIN — GABAPENTIN 600 MG: 600 TABLET, FILM COATED ORAL at 08:40

## 2017-11-05 RX ADMIN — OMEPRAZOLE 40 MG: 20 CAPSULE, DELAYED RELEASE ORAL at 08:40

## 2017-11-05 RX ADMIN — TERIFLUNOMIDE 14 MG: 14 TABLET, FILM COATED ORAL at 08:41

## 2017-11-05 RX ADMIN — AMOXICILLIN 500 MG: 500 CAPSULE ORAL at 20:58

## 2017-11-05 RX ADMIN — ACETAMINOPHEN 650 MG: 325 TABLET, FILM COATED ORAL at 08:40

## 2017-11-05 RX ADMIN — IBUPROFEN 600 MG: 200 TABLET, FILM COATED ORAL at 14:38

## 2017-11-05 RX ADMIN — CHOLECALCIFEROL CAP 125 MCG (5000 UNIT) 5000 UNITS: 125 CAP at 08:40

## 2017-11-05 RX ADMIN — SERTRALINE HYDROCHLORIDE 150 MG: 50 TABLET ORAL at 20:57

## 2017-11-05 RX ADMIN — AMOXICILLIN 500 MG: 500 CAPSULE ORAL at 08:40

## 2017-11-05 RX ADMIN — BACLOFEN 20 MG: 10 TABLET ORAL at 08:40

## 2017-11-05 RX ADMIN — BACLOFEN 20 MG: 10 TABLET ORAL at 20:58

## 2017-11-05 RX ADMIN — GABAPENTIN 900 MG: 300 CAPSULE ORAL at 20:58

## 2017-11-05 RX ADMIN — HYDROCHLOROTHIAZIDE 12.5 MG: 12.5 TABLET ORAL at 08:40

## 2017-11-05 RX ADMIN — OXYBUTYNIN CHLORIDE 10 MG: 10 TABLET, EXTENDED RELEASE ORAL at 20:57

## 2017-11-05 RX ADMIN — NITROFURANTOIN (MONOHYDRATE/MACROCRYSTALS) 100 MG: 75; 25 CAPSULE ORAL at 08:40

## 2017-11-05 RX ADMIN — OXYBUTYNIN CHLORIDE 10 MG: 10 TABLET, EXTENDED RELEASE ORAL at 08:40

## 2017-11-05 RX ADMIN — ACETAMINOPHEN 650 MG: 325 TABLET, FILM COATED ORAL at 14:38

## 2017-11-05 RX ADMIN — SODIUM CHLORIDE 1000 MG: 0.9 INJECTION, SOLUTION INTRAVENOUS at 08:40

## 2017-11-05 RX ADMIN — ONDANSETRON 4 MG: 4 TABLET, ORALLY DISINTEGRATING ORAL at 11:09

## 2017-11-05 RX ADMIN — IBUPROFEN 600 MG: 200 TABLET, FILM COATED ORAL at 08:40

## 2017-11-05 ASSESSMENT — VISUAL ACUITY
OU: GLASSES

## 2017-11-05 ASSESSMENT — PAIN DESCRIPTION - DESCRIPTORS: DESCRIPTORS: ACHING

## 2017-11-05 NOTE — PLAN OF CARE
Problem: Patient Care Overview  Goal: Plan of Care/Patient Progress Review  PT 6A: Discharge Planner PT   Patient plan for discharge: rehab  Current status: Able to squat pivot with min A x1-2 bed<>manual w/c, as long as B shoes and AFO donned.  Significant BLE weakness continues to limit standing ability.  Barriers to return to prior living situation: Increased caregiver assist for ADL and mobility  Recommendations for discharge: TCU  Rationale for recommendations: Continued skilled PT indicated to increase ability for sit<>stands and ADL to return to baseline       Entered by: Helen Webber 11/05/2017 2:13 PM

## 2017-11-05 NOTE — PLAN OF CARE
Problem: Patient Care Overview  Goal: Plan of Care/Patient Progress Review  Outcome: No Change  Pt admitted with MS exacerbation. VSS. A&Ox4 but forgetful at times. Neuros unchanged with  BUE 4/5, BLE 2/5 and numbness/tingling to BLE. On regular diet. Turn q2hrs. Up w/ A2 and lift during the day. Baseline urinary incontinence.   Plan: Continue with POC.

## 2017-11-05 NOTE — PLAN OF CARE
Problem: Pain, Acute (Adult)  Goal: Identify Related Risk Factors and Signs and Symptoms  Related risk factors and signs and symptoms are identified upon initiation of Human [employer] Clinical Practice Guideline (CPG).   Outcome: No Change  VSS. Headache managed with tylenol and ibuprofen. Neuros unchanged; BUE 4/5 BLE 2/5. Slightly stronger on right side. Numbness in BLE that is baseline. Good po intake, pt is able to feed self. Inc of urine x 2, had BM yesterday. Repositioned every 2 hours. Worked with PT and is currently up in . Pt is heavy assist of 2 to pivot or lift. Pt had last IV steroid dose today. Plan to DC to rehab tomorrow.

## 2017-11-05 NOTE — PROGRESS NOTES
Social Work Services Progress Note    Hospital Day: 5  Date of Initial Social Work Evaluation:  11/4/17  Collaborated with:   TCU admissions    Data:  Pt is a 62 year old female being followed by JUSTIN for placement to  TCU.    Intervention:  [facility].  In anticipation of bed availability, JUSTIN has scheduled a [email or web address] wheelchair ride for 1300.  Weekday SW to confirm with admissions tomorrow if a bed is available for admission.    Assessment:  See assessment note from yesterday.    Plan:    Anticipated Disposition:  Facility:   TCU - referral with admissions team    Barriers to d/c plan:  Bed availability, medical stability    Follow Up:  Weekday SW to follow up with  TCU on admission tomorrow.  Ride scheduled in anticipation of bed availability.    LAKHWINDER Johnson, APSW  Weekend Adult Acute Care   Pager [phone]  Care Management Dept [phone]

## 2017-11-05 NOTE — PROGRESS NOTES
[facility], [place]   Neurology Progress Note  11/5/2017    Summary: Marylee Woods is a 62 year old female with h/o MS who presents in presumed acute exacerbation of MS. MRI brain and c-spine completed without enhancing lesions. Started on 5 day course of solumedrol 1g qd 11/1. Throat culture positive for non-group A strep.     Subjective:    No acute events overnight. Patient continues to have headache. No other acute concerns.     Objective:    Vitals: /81 (BP Location: Right arm)  Pulse 76  Temp 98.3  F (36.8  C) (Oral)  Resp 16  Wt 66.3 kg (146 lb 2.6 oz)  LMP  (LMP Unknown)  SpO2 97%  BMI 22.22 kg/m2  General: NAD  HEENT: sclera anicteric  Cardiac: Regular rate  Chest: No respiratory distress  Extremities: No LE edema  Neurologic:   Mental Status: Fully alert, attentive and oriented. Speech soft but clear. Able to follow basic commands.    Cranial Nerves: PERRL. EOMI. Facial movements symmetric. Hearing intact to conversation.No dysarthria.   Motor: Antigravity in UE bilaterally, strength in biceps, brachioradialis is 4+/5 on R, 4/5 on L. Hip flexors not antigravity on either.  L hip flexor weaker than R.   dorsiflexors/plantarflexors are 4+/5 bilaterally  Reflexes: 3+ and symmetric in UE/LE, toes upgoing.    Sensory: Decreased light touch sensation on LUE/LLE  Coordination: FNF intact  Station/Gait: Deferred      Pertinent Investigations:           Lab Results   Component Value Date    WBC 5.4 11/04/2017    HGB 9.2 (L) 11/04/2017    HCT 28.8 (L) 11/04/2017    MCV 88 11/04/2017    PLT 78 (L) 11/04/2017     MRI Brain and C-spine: No new enhancing lesions.      Assessment/Plan:   # Multiple sclerosis, acute exacerbation:  No clear infectious source causing exacerbation. CXR and UA negative in ED. Patient does have L ear pain, although these were normal appearing in the ED. MRI without enhancing lesions. Question whether may be progressing to secondary progressive phase  given age and worsening with lack of new enhancing lesions.Patient continues to relay that she is not back to baseline. Will finish 5 day course of methylpred (ending today).  -Continue home aubagio 14mg qd  -Will finish 5 day course of IV methylpred today  -Continue home baclofen 20mg BID for spasticity  -Continue vitamin D3 supplementation  -Continue home omeprazole 40mg qd while on steroids  -She inquired about f/u at [facility], did not want to commit at present, but f/u offered if she desires.       # Pancytopenia:  Patient has been worked up as OP at heme/onc. This is thought to be due to medication side effect from MS drugs or nitrofurantoin. Currently stable.  -CBC daily    # Strep pharyngitis:  Throat culture came back positive with non-group A strep. Patient asymptomatic although was complaining of ear pain on the L on admission.  -Amoxicillin 500mg BID x10 days    # Painful paresthesias:  -Continue home gabapentin 600-900mg     # Mood disorder:  -Continue home sertraline 150mg qd     # Sleep disorder:  -Continue home ativan 1mg qhs prn     # Neurogenic bladder:  -Continue home oxybutynin 20mg qd, nitrofurantoin 100mg daily     # HTN:  -Continue home HCTZ 12.5mg qd     Code: FULL  FEN: Regular adult diet  Px: heparin SQ     Dispo: PT rec's TCU placement medically stable for discharge on 11/5. SW assisting with placement likely tomorrow      Patient seen and discussed with attending physician Dr. Brenner.    Diana Sandoval PGY-4

## 2017-11-05 NOTE — PLAN OF CARE
Problem: Patient Care Overview  Goal: Plan of Care/Patient Progress Review  Outcome: Improving  Pt is on 6A for presumed MS flare. Pt is A&Ox4. VSS. Baseline BLE numbness and tingling. L hand ataxic. Generalized weakness; L side slightly weaker than R. RUE 5/5 LUE 4+/5. RLE 2+/5 LLE 2/5.  Up with lift. Tylenol given for headache. Amoxicillin is for nonsymptomatic positive strep on throat culture. Tolerating regular diet with fair appetite. Incontinent of urine. Repositioned every 2 hours. Ativan given at bedtime. Charge nurse notified of baclofen PTA dose discrepancy. Pt states she takes 20mg Baclofen BID; order is for 10mg. Solumedrol dose 5/5 tomorrow, after which pt can discharge home. Home CPAP at the bedside. Continue to monitor.

## 2017-11-06 ENCOUNTER — CARE COORDINATION (OUTPATIENT)
Dept: CARE COORDINATION | Facility: CLINIC | Age: 62
End: 2017-11-06

## 2017-11-06 ENCOUNTER — HOSPITAL ENCOUNTER (INPATIENT)
Facility: SKILLED NURSING FACILITY | Age: 62
LOS: 19 days | Discharge: HOME HEALTH | End: 2017-11-25
Attending: INTERNAL MEDICINE | Admitting: INTERNAL MEDICINE
Payer: COMMERCIAL

## 2017-11-06 DIAGNOSIS — Z01.818 PREOP GENERAL PHYSICAL EXAM: ICD-10-CM

## 2017-11-06 DIAGNOSIS — G35 MS (MULTIPLE SCLEROSIS) (H): ICD-10-CM

## 2017-11-06 DIAGNOSIS — G35 MULTIPLE SCLEROSIS EXACERBATION (H): Primary | ICD-10-CM

## 2017-11-06 DIAGNOSIS — I10 ESSENTIAL HYPERTENSION WITH GOAL BLOOD PRESSURE LESS THAN 140/90: ICD-10-CM

## 2017-11-06 LAB
ERYTHROCYTE [DISTWIDTH] IN BLOOD BY AUTOMATED COUNT: 13.4 % (ref 10–15)
HCT VFR BLD AUTO: 28.5 % (ref 35–47)
HGB BLD-MCNC: 9.5 G/DL (ref 11.7–15.7)
MCH RBC QN AUTO: 28.4 PG (ref 26.5–33)
MCHC RBC AUTO-ENTMCNC: 33.3 G/DL (ref 31.5–36.5)
MCV RBC AUTO: 85 FL (ref 78–100)
PLATELET # BLD AUTO: 77 10E9/L (ref 150–450)
RBC # BLD AUTO: 3.34 10E12/L (ref 3.8–5.2)
WBC # BLD AUTO: 4.1 10E9/L (ref 4–11)

## 2017-11-06 PROCEDURE — 36416 COLLJ CAPILLARY BLOOD SPEC: CPT | Performed by: PSYCHIATRY & NEUROLOGY

## 2017-11-06 PROCEDURE — 12000022 ZZH R&B SNF

## 2017-11-06 PROCEDURE — 25000132 ZZH RX MED GY IP 250 OP 250 PS 637: Performed by: INTERNAL MEDICINE

## 2017-11-06 PROCEDURE — 99306 1ST NF CARE HIGH MDM 50: CPT | Mod: AI | Performed by: INTERNAL MEDICINE

## 2017-11-06 PROCEDURE — 25000132 ZZH RX MED GY IP 250 OP 250 PS 637: Performed by: STUDENT IN AN ORGANIZED HEALTH CARE EDUCATION/TRAINING PROGRAM

## 2017-11-06 PROCEDURE — 94660 CPAP INITIATION&MGMT: CPT

## 2017-11-06 PROCEDURE — 85027 COMPLETE CBC AUTOMATED: CPT | Performed by: PSYCHIATRY & NEUROLOGY

## 2017-11-06 RX ORDER — BACLOFEN 10 MG/1
10 TABLET ORAL 2 TIMES DAILY
Qty: 90 TABLET | Status: ON HOLD | DISCHARGE
Start: 2017-11-06 | End: 2017-11-24

## 2017-11-06 RX ORDER — GABAPENTIN 300 MG/1
900 CAPSULE ORAL AT BEDTIME
Qty: 90 CAPSULE | DISCHARGE
Start: 2017-11-06 | End: 2018-10-31

## 2017-11-06 RX ORDER — ACETAMINOPHEN 650 MG/1
650 SUPPOSITORY RECTAL EVERY 4 HOURS PRN
Status: DISCONTINUED | OUTPATIENT
Start: 2017-11-06 | End: 2017-11-25 | Stop reason: HOSPADM

## 2017-11-06 RX ORDER — HYDROCHLOROTHIAZIDE 12.5 MG/1
12.5 TABLET ORAL DAILY
Qty: 90 TABLET | Refills: 3 | Status: ON HOLD | DISCHARGE
Start: 2017-11-06 | End: 2017-11-25

## 2017-11-06 RX ORDER — GABAPENTIN 300 MG/1
900 CAPSULE ORAL AT BEDTIME
Status: DISCONTINUED | OUTPATIENT
Start: 2017-11-06 | End: 2017-11-25 | Stop reason: HOSPADM

## 2017-11-06 RX ORDER — BACLOFEN 20 MG/1
20 TABLET ORAL 2 TIMES DAILY
Status: DISCONTINUED | OUTPATIENT
Start: 2017-11-06 | End: 2017-11-25 | Stop reason: HOSPADM

## 2017-11-06 RX ORDER — AMOXICILLIN 500 MG/1
500 CAPSULE ORAL EVERY 12 HOURS
Status: COMPLETED | OUTPATIENT
Start: 2017-11-06 | End: 2017-11-13

## 2017-11-06 RX ORDER — GABAPENTIN 300 MG/1
600 CAPSULE ORAL EVERY MORNING
Qty: 90 CAPSULE | DISCHARGE
Start: 2017-11-06 | End: 2018-09-05

## 2017-11-06 RX ORDER — HYDROCHLOROTHIAZIDE 12.5 MG/1
12.5 TABLET ORAL DAILY
Status: DISCONTINUED | OUTPATIENT
Start: 2017-11-07 | End: 2017-11-25 | Stop reason: HOSPADM

## 2017-11-06 RX ORDER — AMOXICILLIN 500 MG/1
500 CAPSULE ORAL EVERY 12 HOURS
Refills: 0 | Status: ON HOLD | DISCHARGE
Start: 2017-11-06 | End: 2017-11-24

## 2017-11-06 RX ORDER — GABAPENTIN 300 MG/1
600 CAPSULE ORAL EVERY MORNING
Status: DISCONTINUED | OUTPATIENT
Start: 2017-11-07 | End: 2017-11-25 | Stop reason: HOSPADM

## 2017-11-06 RX ORDER — RENAGEL 800 MG/1
14 TABLET ORAL DAILY
Refills: 11 | DISCHARGE
Start: 2017-11-06 | End: 2017-12-12

## 2017-11-06 RX ORDER — OXYBUTYNIN CHLORIDE 10 MG/1
10 TABLET, EXTENDED RELEASE ORAL
Status: DISCONTINUED | OUTPATIENT
Start: 2017-11-06 | End: 2017-11-25 | Stop reason: HOSPADM

## 2017-11-06 RX ORDER — ACETAMINOPHEN 325 MG/1
650 TABLET ORAL EVERY 4 HOURS PRN
Status: DISCONTINUED | OUTPATIENT
Start: 2017-11-06 | End: 2017-11-25 | Stop reason: HOSPADM

## 2017-11-06 RX ORDER — TERIFLUNOMIDE 14 MG/1
14 TABLET, FILM COATED ORAL DAILY
Status: DISCONTINUED | OUTPATIENT
Start: 2017-11-07 | End: 2017-11-25 | Stop reason: HOSPADM

## 2017-11-06 RX ORDER — VALACYCLOVIR HYDROCHLORIDE 500 MG/1
500 TABLET, FILM COATED ORAL 2 TIMES DAILY PRN
Status: DISCONTINUED | OUTPATIENT
Start: 2017-11-06 | End: 2017-11-11

## 2017-11-06 RX ORDER — LORAZEPAM 1 MG/1
1 TABLET ORAL
Qty: 60 TABLET | Status: SHIPPED | DISCHARGE
Start: 2017-11-06 | End: 2017-12-19

## 2017-11-06 RX ORDER — BACLOFEN 10 MG/1
10 TABLET ORAL 2 TIMES DAILY
Status: DISCONTINUED | OUTPATIENT
Start: 2017-11-06 | End: 2017-11-06

## 2017-11-06 RX ORDER — LORAZEPAM 0.5 MG/1
1 TABLET ORAL
Status: DISCONTINUED | OUTPATIENT
Start: 2017-11-06 | End: 2017-11-25 | Stop reason: HOSPADM

## 2017-11-06 RX ORDER — OXYBUTYNIN CHLORIDE 10 MG/1
10 TABLET, EXTENDED RELEASE ORAL
Qty: 30 TABLET | Status: ON HOLD | DISCHARGE
Start: 2017-11-06 | End: 2024-01-06

## 2017-11-06 RX ORDER — SERTRALINE HYDROCHLORIDE 100 MG/1
150 TABLET, FILM COATED ORAL DAILY
Qty: 135 TABLET | Refills: 3 | DISCHARGE
Start: 2017-11-06 | End: 2018-03-14

## 2017-11-06 RX ORDER — MULTIPLE VITAMINS W/ MINERALS TAB 9MG-400MCG
1 TAB ORAL DAILY
Status: DISCONTINUED | OUTPATIENT
Start: 2017-11-06 | End: 2017-11-25 | Stop reason: HOSPADM

## 2017-11-06 RX ADMIN — ACETAMINOPHEN 650 MG: 325 TABLET, FILM COATED ORAL at 20:28

## 2017-11-06 RX ADMIN — LORAZEPAM 1 MG: 1 TABLET ORAL at 00:40

## 2017-11-06 RX ADMIN — AMOXICILLIN 500 MG: 500 CAPSULE ORAL at 20:28

## 2017-11-06 RX ADMIN — TERIFLUNOMIDE 14 MG: 14 TABLET, FILM COATED ORAL at 08:23

## 2017-11-06 RX ADMIN — MULTIPLE VITAMINS W/ MINERALS TAB 1 TABLET: TAB at 16:30

## 2017-11-06 RX ADMIN — NITROFURANTOIN (MONOHYDRATE/MACROCRYSTALS) 100 MG: 75; 25 CAPSULE ORAL at 08:23

## 2017-11-06 RX ADMIN — LORAZEPAM 1 MG: 0.5 TABLET ORAL at 20:27

## 2017-11-06 RX ADMIN — AMOXICILLIN 500 MG: 500 CAPSULE ORAL at 08:23

## 2017-11-06 RX ADMIN — ACETAMINOPHEN 650 MG: 325 TABLET, FILM COATED ORAL at 00:40

## 2017-11-06 RX ADMIN — OXYBUTYNIN CHLORIDE 10 MG: 10 TABLET, EXTENDED RELEASE ORAL at 08:23

## 2017-11-06 RX ADMIN — OMEPRAZOLE 40 MG: 20 CAPSULE, DELAYED RELEASE ORAL at 08:22

## 2017-11-06 RX ADMIN — GABAPENTIN 900 MG: 300 CAPSULE ORAL at 20:29

## 2017-11-06 RX ADMIN — CHOLECALCIFEROL CAP 125 MCG (5000 UNIT) 5000 UNITS: 125 CAP at 08:23

## 2017-11-06 RX ADMIN — OXYBUTYNIN CHLORIDE 10 MG: 10 TABLET, FILM COATED, EXTENDED RELEASE ORAL at 16:31

## 2017-11-06 RX ADMIN — SERTRALINE HYDROCHLORIDE 150 MG: 100 TABLET ORAL at 20:28

## 2017-11-06 RX ADMIN — GABAPENTIN 600 MG: 600 TABLET, FILM COATED ORAL at 08:22

## 2017-11-06 RX ADMIN — ACETAMINOPHEN 650 MG: 325 TABLET, FILM COATED ORAL at 08:22

## 2017-11-06 RX ADMIN — BACLOFEN 20 MG: 10 TABLET ORAL at 08:23

## 2017-11-06 RX ADMIN — BACLOFEN 20 MG: 20 TABLET ORAL at 20:28

## 2017-11-06 RX ADMIN — HYDROCHLOROTHIAZIDE 12.5 MG: 12.5 TABLET ORAL at 08:22

## 2017-11-06 ASSESSMENT — PAIN DESCRIPTION - DESCRIPTORS: DESCRIPTORS: ACHING

## 2017-11-06 ASSESSMENT — VISUAL ACUITY
OU: GLASSES
OU: GLASSES

## 2017-11-06 NOTE — PROGRESS NOTES
Patient was discharged to [facility] so they will handle post discharge follow up cares and coordination          Anticipated Disposition:  [facility]

## 2017-11-06 NOTE — PHARMACY-TCU REVIEW OF H&P
I have reviewed this patient's TCU admission History & Physical for medication related changes/recommendations identified by the admitting provider.  I am confirming that there are no recommendations requiring changes to medication orders at this time based on the provider recommendations in the H&P.  Renee Abdalla PharmD, BCPS November 6, 2017

## 2017-11-06 NOTE — H&P
"  History and Physical      Marylee Woods MRN# [id number]   YOB: 1955 Age: 62 year old      Date of Admission:  11/6/2017    Primary care provider: Carolina Pulliam          Assessment and Plan:   MS exacerbation; MRI with and without contrast was performed of brain and c-spine and were without enhancing lesions. Question whether may be progressing to secondary progressive phase given age and worsening with lack of new enhancing lesions. She was treated for acute exacerbation with 5 days of IV solumedrol with mild improvement  - ct Aubagio 14mg qd, baclofefn 20 mg bid, D3   - f/u with neurology in 1-2 months  - Ct Gabapentin     # GERD- ct Omeprazole     # Pancytopenia- apparently worked up as o/p by hem-onc and thought sec to medicaitons for MS or nitrofurantoin  - stable. Monitor    # UTI ppx- on Nitrofurantoin at home. Current Cr Cl <60. Advised 2 L of PO fluids today. Recheck Cr and if better to resume it or need to change prophylaxis     # Strep Pharyngitis- non gp A strep  - Complete Amoxicillin 500 mg bid x 10 days (Stop date- 11/13/17)     # HTN and Lymphedema- HCTZ  # Anxiety- Ativan prn , Zoloft     # DVT ppx- mechanical for now as pancytopenia (Had mechanical in acute hospital)     Basil Bacon MD (Pager- [phone])   Internal Medicine/ Hospitalist                  Chief Complaint:   weakness    History obtained from                     \"History is obtained from the patient\"         History of Present Illness:    62 year-old female with a h/o MS admitted to neurology service from 10/31- 11/6 with presumed acute exacerbation of her MS with worsening parasthesias, diplopia, and decreased strength. Found to have strep  Throat and had Abx and course of steroid with some improvement -MRI with and without contrast was performed of brain and c-spine and were without enhancing lesions. Question whether may be progressing to secondary progressive phase given age and worsening with lack of new " enhancing lesions. (Please see previous d/c summary for details). Now transferred to TCU for continued rehab.   curenntly Denies Chest pain/ SOB/ cough,Denies urinary problems , Denies fever/chills/rigors   Says usually has nausea from heartburn  C/O headache since previous admission and chronic mild blurring of vision left eye                 Past Medical History:     Past Medical History:   Diagnosis Date     Gastro-oesophageal reflux disease      Multiple sclerosis (H)              Past Surgical History:     Past Surgical History:   Procedure Laterality Date     C/SECTION, LOW TRANSVERSE  1992     COLONOSCOPY  2007     COLONOSCOPY N/A 1/26/2017    Procedure: COMBINED COLONOSCOPY, SINGLE OR MULTIPLE BIOPSY/POLYPECTOMY BY BIOPSY;  Surgeon: Mayo Adkins MD, MD;  Location:  GI     ESOPHAGOSCOPY, GASTROSCOPY, DUODENOSCOPY (EGD), COMBINED  1/26/2017    Dr. Adkins [facility]     ESOPHAGOSCOPY, GASTROSCOPY, DUODENOSCOPY (EGD), COMBINED N/A 1/26/2017    Procedure: COMBINED ESOPHAGOSCOPY, GASTROSCOPY, DUODENOSCOPY (EGD), BIOPSY SINGLE OR MULTIPLE;  Surgeon: Mayo Adkins MD, MD;  Location:  GI     HIP SURGERY  2009    femur ortho surgery     LAPAROSCOPIC CHOLECYSTECTOMY  6/24/2014    Procedure: LAPAROSCOPIC CHOLECYSTECTOMY;  Surgeon: Randy Bailey MD;  Location[facility]     OPEN REDUCTION INTERNAL FIXATION RODDING INTRAMEDULLARY TIBIA  4/14/2013    Procedure: OPEN REDUCTION INTERNAL FIXATION RODDING INTRAMEDULLARY TIBIA;;  Surgeon: Sajan Cast MD;  Location:  OR     ORTHOPEDIC SURGERY  2009    surgery right upper femur fx near hip             Social History:     Social History     Social History     Marital status:      Spouse name: N/A     Number of children: N/A     Years of education: N/A     Social History Main Topics     Smoking status: Light Tobacco Smoker     Packs/day: 0.50     Types: Cigarettes     Smokeless tobacco: Never Used     Alcohol use 0.0 oz/week     0 Standard drinks or equivalent per  week      Comment: 2/month     Drug use: No     Sexual activity: Yes     Partners: Male     Other Topics Concern     Not on file     Social History Narrative             Family History:     Family History   Problem Relation Age of Onset     HEART DISEASE Maternal Grandmother      CANCER Maternal Grandfather      HEART DISEASE Paternal Grandfather      HEART DISEASE Mother      HEART DISEASE Father      DIABETES No family hx of      Coronary Artery Disease No family hx of      Hypertension No family hx of      Hyperlipidemia No family hx of      CEREBROVASCULAR DISEASE No family hx of      Breast Cancer No family hx of      Colon Cancer No family hx of      Prostate Cancer No family hx of      Other Cancer No family hx of      Depression No family hx of      Anxiety Disorder No family hx of      MENTAL ILLNESS No family hx of      Substance Abuse No family hx of      Anesthesia Reaction No family hx of      Asthma No family hx of      OSTEOPOROSIS No family hx of      Genetic Disorder No family hx of      Thyroid Disease No family hx of      Obesity No family hx of      Unknown/Adopted No family hx of              Immunizations:     Immunization History   Administered Date(s) Administered     Influenza (IIV3) 11/10/2011, 11/26/2011, 01/01/2013, 10/04/2014     Influenza Vaccine IM 3yrs+ 4 Valent IIV4 11/01/2016     Mantoux 11/23/2011, 12/13/2011, 10/05/2015     Pneumococcal 23 valent 11/09/2011, 11/26/2011     TD (ADULT, 7+) 10/21/2004     TDAP Vaccine (Adacel) 11/04/2014            Allergies:     Allergies   Allergen Reactions     Budesonide              Medications:     Prescriptions Prior to Admission   Medication Sig Dispense Refill Last Dose     LORazepam (ATIVAN) 1 MG tablet Take 1 tablet (1 mg) by mouth nightly as needed for anxiety 60 tablet       amoxicillin (AMOXIL) 500 MG capsule Take 1 capsule (500 mg) by mouth every 12 hours for 7 days  0      gabapentin (NEURONTIN) 300 MG capsule Take 2 capsules (600 mg)  by mouth every morning 90 capsule       gabapentin (NEURONTIN) 300 MG capsule Take 3 capsules (900 mg) by mouth At Bedtime 90 capsule       sertraline (ZOLOFT) 100 MG tablet Take 1.5 tablets (150 mg) by mouth daily 135 tablet 3      hydrochlorothiazide 12.5 MG TABS tablet Take 1 tablet (12.5 mg) by mouth daily 90 tablet 3      AUBAGIO 14 MG tablet Take 1 tablet (14 mg) by mouth daily  11      baclofen (LIORESAL) 10 MG tablet Take 1 tablet (10 mg) by mouth 2 times daily 90 tablet       oxybutynin (DITROPAN-XL) 10 MG 24 hr tablet Take 1 tablet (10 mg) by mouth 2 times daily 30 tablet       cholecalciferol (VITAMIN D3) 5000 UNITS CAPS capsule Take 1 capsule (5,000 Units) by mouth daily        POTASSIUM CHLORIDE PO Take 99 mg by mouth daily   10/31/2017 at Unknown time     omeprazole (PRILOSEC) 40 MG capsule Take one capsule by mouth daily with breakfast.  3 10/31/2017 at Unknown time     nitrofurantoin macrocrystal (MACRODANTIN) 100 MG capsule Take 100 mg by mouth daily UTI prophylaxis  11 10/31/2017 at Unknown time     valACYclovir (VALTREX) 500 MG tablet Take 1 tablet (500 mg) by mouth 2 times daily (Patient taking differently: Take 500 mg by mouth 2 times daily as needed ) 12 tablet 5 More than a month at Unknown time     NICOTROL 10 MG inhaler Inhale 1 Cartridge into the lungs daily as needed for smoking cessation   1 Past Month at Unknown time     ORDER FOR DME Equipment being ordered: medical lift chair 1 Units 0 Taking     multivitamin, therapeutic with minerals (THERA-VIT-M) TABS Take 1 tablet by mouth daily.   10/31/2017 at Unknown time             Review of Systems:   The 10 point Review of Systems is negative other than noted in the HPI      Vitals were reviewed  Vitals: BP (P) 149/76 (BP Location: Right arm)  Temp (P) 98.3  F (36.8  C) (Oral)  Resp (P) 16  LMP  (LMP Unknown)  BMI= There is no height or weight on file to calculate BMI.    GEN:    No acute distress  HEENT: Anicteric, extraocular muscles  are intact with no nystagmus    Moist mucous membranes with no oropharyngeal erythema or discharge                          Denture in place   NECK:  Supple with no jugular venous distention  CVS:  s1s2 show a regular rate and rhythm with no murmurs, rubs or gallops,      carotid pulses normal, distal pulses normal  CHEST: Bilaterally clear to auscultation with no rales, rhonchi or wheezes  ABDOMEN: Non-tender and non-distended     normal bowel sounds in all four quadrants    No rebound or guarding  EXT:  No cyanosis, clubbing. Has  edema  NEURO: Alert and oriented to person, place and time    Cranial nerves II-XII are grossly intact  PSCYH: Normal affect  SKIN:  No rashes or jaundice         Data:     Latest Labs:     BMP  Recent Labs  Lab 11/02/17  0905 11/01/17 0047    142   POTASSIUM 3.9 3.8   CHLORIDE 112* 108   JORDON 8.6 8.9   CO2 25 28   BUN 19 16   CR 0.93 0.94   * 94     CBC  Recent Labs  Lab 11/06/17  0812 11/04/17  0812 11/02/17  0905 11/01/17 0047   WBC 4.1 5.4 3.5* 3.4*   RBC 3.34* 3.27* 3.50* 3.69*   HGB 9.5* 9.2* 9.9* 10.7*   HCT 28.5* 28.8* 30.5* 32.4*   MCV 85 88 87 88   MCH 28.4 28.1 28.3 29.0   MCHC 33.3 31.9 32.5 33.0   RDW 13.4 13.9 13.6 13.2   PLT 77* 78* 101* 85*     INRNo lab results found in last 7 days.  LFTs  Recent Labs  Lab 11/01/17 0047   ALKPHOS 77   AST 18   ALT 15   BILITOTAL 0.5   PROTTOTAL 6.9   ALBUMIN 3.4      MRI brain with and w/out contrast:   Impression: The study demonstrates numerous foci of T2-hyperintensity  within the cerebral white matter consistent with the clinical  suspicion of demyelinating disease. There no abnormal enhancement  noted . There are likely increased number of lesions, but direct  comparison is difficult due to motion, especially on the prior study  and different imaging planes compared to prior study.      MRI C-spine  Impression:       1. Few scattered areas of T2 hyperintensities in the cervical spine,  as detailed above, with no  definite evidence of corresponding  enhancement. Findings likely related to demyelination foci within the  cervical spine with no definite evidence of active disease. Difficult  to evaluate for progression given the limited prior exams due to  motion artifact.  2. Mild multilevel degenerative changes of the cervical spine.     Other:

## 2017-11-06 NOTE — PROGRESS NOTES
Social Work Services Discharge Note      Patient Name:  Marylee Woods     Anticipated Discharge Date:  11/6/17    Discharge Disposition:   TCU:  [place] [address] [address]  [phone]    Following MD:  Per Facility Assignment     Pre-Admission Screening (PAS) online form has been completed.  The Level of Care (LOC) is:  Determined  Confirmation Code is:  [id number]  Patient/caregiver informed of referral to [facility] for Pre-Admission Screening for skilled nursing facility (SNF) placement and to expect a phone call post discharge from SNF.     Additional Services/Equipment Arranged:  Transportation arranged via [employer] for w/c van at 1:00pm.     Patient / Family response to discharge plan:  Pt is agreeable to transfer.     Persons notified of above discharge plan:  Pt, RN, MD, [facility], [facility].    Staff Discharge Instructions:  Please fax discharge orders and signed hard scripts for any controlled substances.  Please print a packet and send with patient.     CTS Handoff completed:  YES    Medicare Notice of Rights provided to the patient/family:  HUYEN Sahni  Emergency Department   Pager: [phone]

## 2017-11-06 NOTE — IP AVS SNAPSHOT
[address] [address]    Phone:  [phone]                                       After Visit Summary   11/6/2017    Marylee Woods    MRN: [id number]           After Visit Summary Signature Page     I have received my discharge instructions, and my questions have been answered. I have discussed any challenges I see with this plan with the nurse or doctor.    ..........................................................................................................................................  Patient/Patient Representative Signature      ..........................................................................................................................................  Patient Representative Print Name and Relationship to Patient    ..................................................               ................................................  Date                                            Time    ..........................................................................................................................................  Reviewed by Signature/Title    ...................................................              ..............................................  Date                                                            Time

## 2017-11-06 NOTE — IP AVS SNAPSHOT
MRN:[id number]                      After Visit Summary   11/6/2017    Marylee Woods    MRN: [id number]           Thank you!     Thank you for choosing [facility] for your care. Our goal is always to provide you with excellent care. Hearing back from our patients is one way we can continue to improve our services. Please take a few minutes to complete the written survey that you may receive in the mail after you visit with us. Thank you!        Patient Information     Date Of Birth          1955        Designated Caregiver       Most Recent Value    Caregiver    Will someone help with your care after discharge? yes    Name of designated caregiver Manuel Huerta)    Phone number of caregiver [phone]      About your hospital stay     You were admitted on:  November 6, 2017 You last received care in the:  [facility]    You were discharged on:  November 25, 2017        Reason for your hospital stay       Admitted with MS exacerbation. Was in TCU for rehab. Doing well., so being Discharged home.                  Who to Call     For medical emergencies, please call 911.  For non-urgent questions about your medical care, please call your primary care provider or clinic, [phone]          Attending Provider     Provider Specialty    Basil Bacon MD Internal Medicine    Carlos Zurita MD Internal Medicine       Primary Care Provider Office Phone # Fax #    Carolina Pulliam [phone]      After Care Instructions     Activity       Your activity upon discharge: activity as tolerated            Diet       Follow this diet upon discharge: Orders Placed This Encounter      Snacks/Supplements Adult: Ensure Plus (Adult); Between Meals      Snacks/Supplements Adult: Other - Please comment; Gelatein; With Meals      Regular Diet Adult                  Follow-up Appointments     Adult [facility] Follow-up and recommended labs and tests       Follow up with primary care  provider, Carolina Pulliam, within 7 days for hospital follow- up.  No follow up labs or test are needed.  FU with Neurology in one weeks time. Re Recent MS exacerbation fu      Appointments on [place] and/or [facility] (with [facility] or [facility] provider or service). Call [phone] if you haven't heard regarding these appointments within 7 days of discharge.                  Your next 10 appointments already scheduled     Dec 01, 2017  8:00 AM CST   Level 7 with  INFUSION CHAIR 7   [facility] ([facility])    [facility]  [address]  [place] [id number] [phone]              Additional Services     Home Care OT Referral for Hospital Discharge       OT to eval and treat ADL's/IADL's, energy conservation, home safety    Your provider has ordered home care - occupational therapy. If you have not been contacted within 2 days of your discharge please call the department phone number listed on the top of this document.            Home Care PT Referral for Hospital Discharge       PT to eval and treat mobility, strengthening, home safety    Home care services to begin 12/4/2017 since patient has daily all day infusions the week of 11/27/2017    Your provider has ordered home care - physical therapy. If you have not been contacted within 2 days of your discharge please call the department phone number listed on the top of this document.                  Future tests that were ordered for you     HOME BLOOD PRESSURE MONITORING           SPHYG/BP ABHISHEK W CUFF AND STET       Use as directed to check BP                  Pending Results     No orders found from 11/4/2017 to 11/7/2017.            Statement of Approval     Ordered          11/24/17 1423  I have reviewed and agree with all the recommendations and orders detailed in this document.  EFFECTIVE NOW     Approved and electronically signed by:  Pretty Carter MD         "     Admission Information     Date & Time Department Dept. Phone    2017 [facility] [phone]      Your Vitals Were     Blood Pressure Pulse Temperature Respirations Weight Last Period    121/62 (BP Location: Left arm) 76 96.3  F (35.7  C) (Oral) 16 59.1 kg (130 lb 4.8 oz) (LMP Unknown)    Pulse Oximetry BMI (Body Mass Index)                94% 19.81 kg/m2          MyChart Information     [facility] lets you send messages to your doctor, view your test results, renew your prescriptions, schedule appointments and more. To sign up, go to [email or web address] . Click on \"Log in\" on the left side of the screen, which will take you to the Welcome page. Then click on \"Sign up Now\" on the right side of the page.     You will be asked to enter the access code listed below, as well as some personal information. Please follow the directions to create your username and password.     Your access code is: [email or web address]  Expires: 2018 11:13 AM     Your access code will  in 90 days. If you need help or a new code, please call your [facility] clinic or [phone].        Care EveryWhere ID     This is your Care EveryWhere ID. This could be used by other organizations to access your [facility] medical records  [id number]        Equal Access to Services     BRIE PALOMINO AH: Haddelfin Medrano, waaxda luqadaha, qaybta kaalmada ernesto, kaylen watkins. So [facility] [phone].    ATENCIÓN: Si habla español, tiene a de oliveira disposición servicios gratuitos de asistencia lingüística. Mildred al [phone].    We comply with applicable federal civil rights laws and [place] laws. We do not discriminate on the basis of race, color, national origin, age, disability, sex, sexual orientation, or gender identity.               Review of your medicines      START taking        Dose / Directions    acetaminophen 325 MG tablet   Commonly known as:  TYLENOL   Used for:  Multiple sclerosis " exacerbation (H)        Dose:  650 mg   Take 2 tablets (650 mg) by mouth every 4 hours as needed for mild pain or fever (greater than 101 degrees)   Quantity:  100 tablet   Refills:  0       polyethylene glycol Packet   Commonly known as:  MIRALAX/GLYCOLAX   Used for:  Multiple sclerosis exacerbation (H)        Dose:  17 g   Take 17 g by mouth daily   Quantity:  30 packet   Refills:  1       senna-docusate 8.6-50 MG per tablet   Commonly known as:  SENOKOT-S;PERICOLACE   Used for:  Multiple sclerosis exacerbation (H)        Dose:  2 tablet   Take 2 tablets by mouth 2 times daily   Quantity:  100 tablet   Refills:  1         CONTINUE these medicines which may have CHANGED, or have new prescriptions. If we are uncertain of the size of tablets/capsules you have at home, strength may be listed as something that might have changed.        Dose / Directions    baclofen 10 MG tablet   Commonly known as:  LIORESAL   This may have changed:  how much to take   Used for:  Preop general physical exam, MS (multiple sclerosis) (H)        Dose:  20 mg   Take 2 tablets (20 mg) by mouth 2 times daily   Quantity:  90 tablet   Refills:  0       valACYclovir 500 MG tablet   Commonly known as:  VALTREX   This may have changed:    - when to take this  - reasons to take this   Used for:  Herpes simplex disease        Dose:  500 mg   Take 1 tablet (500 mg) by mouth 2 times daily   Quantity:  12 tablet   Refills:  5         CONTINUE these medicines which have NOT CHANGED        Dose / Directions    AUBAGIO 14 MG tablet   Used for:  MS (multiple sclerosis) (H)   Generic drug:  teriflunomide        Dose:  14 mg   Take 1 tablet (14 mg) by mouth daily   Refills:  11       cholecalciferol 5000 UNITS Caps capsule   Commonly known as:  vitamin D3   Used for:  MS (multiple sclerosis) (H)        Dose:  5000 Units   Take 1 capsule (5,000 Units) by mouth daily   Refills:  0       * gabapentin 300 MG capsule   Commonly known as:  NEURONTIN   Used for:   Paresthesia        Dose:  600 mg   Take 2 capsules (600 mg) by mouth every morning   Quantity:  90 capsule   Refills:  0       * gabapentin 300 MG capsule   Commonly known as:  NEURONTIN   Used for:  Paresthesia        Dose:  900 mg   Take 3 capsules (900 mg) by mouth At Bedtime   Quantity:  90 capsule   Refills:  0       LORazepam 1 MG tablet   Commonly known as:  ATIVAN   Used for:  Anxiety        Dose:  1 mg   Take 1 tablet (1 mg) by mouth nightly as needed for anxiety   Quantity:  60 tablet   Refills:  0       multivitamin, therapeutic with minerals Tabs tablet   Used for:  Anemia        Dose:  1 tablet   Take 1 tablet by mouth daily.   Refills:  0       NICOTROL 10 MG Inhaler   Generic drug:  nicotine        Dose:  1 Cartridge   Inhale 1 Cartridge into the lungs daily as needed for smoking cessation   Refills:  1       nitroFURantoin macrocrystal 100 MG capsule   Commonly known as:  MACRODANTIN        Dose:  100 mg   Take 100 mg by mouth daily UTI prophylaxis   Refills:  11       omeprazole 40 MG capsule   Commonly known as:  priLOSEC        Take one capsule by mouth daily with breakfast.   Refills:  3       order for DME   Used for:  MS (multiple sclerosis) (H)        Equipment being ordered: medical lift chair   Quantity:  1 Units   Refills:  0       oxybutynin 10 MG 24 hr tablet   Commonly known as:  DITROPAN-XL   Used for:  Neurogenic bladder        Dose:  10 mg   Take 1 tablet (10 mg) by mouth 2 times daily   Quantity:  30 tablet   Refills:  0       POTASSIUM CHLORIDE PO   Indication:  OTC for potassium supplementation.        Dose:  99 mg   Take 99 mg by mouth daily   Refills:  0       sertraline 100 MG tablet   Commonly known as:  ZOLOFT   Used for:  Major depression in complete remission (H)        Dose:  150 mg   Take 1.5 tablets (150 mg) by mouth daily   Quantity:  135 tablet   Refills:  3       * Notice:  This list has 2 medication(s) that are the same as other medications prescribed for you. Read  the directions carefully, and ask your doctor or other care provider to review them with you.      STOP taking     amoxicillin 500 MG capsule   Commonly known as:  AMOXIL           hydrochlorothiazide 12.5 MG Tabs tablet                Where to get your medicines      These medications were sent to [facility] [id number] [facility] AT [address] [address]     Phone:  [phone]     baclofen 10 MG tablet    polyethylene glycol Packet    senna-docusate 8.6-50 MG per tablet         Some of these will need a paper prescription and others can be bought over the counter. Ask your nurse if you have questions.     You don't need a prescription for these medications     acetaminophen 325 MG tablet                Protect others around you: Learn how to safely use, store and throw away your medicines at www.disposemymeds.org.             Medication List: This is a list of all your medications and when to take them. Check marks below indicate your daily home schedule. Keep this list as a reference.      Medications           Morning Afternoon Evening Bedtime As Needed    acetaminophen 325 MG tablet   Commonly known as:  TYLENOL   Take 2 tablets (650 mg) by mouth every 4 hours as needed for mild pain or fever (greater than 101 degrees)   Last time this was given:  1,000 mg on 11/30/2017  8:34 AM                                AUBAGIO 14 MG tablet   Take 1 tablet (14 mg) by mouth daily   Last time this was given:  14 mg on 11/25/2017  8:36 AM   Generic drug:  teriflunomide                                baclofen 10 MG tablet   Commonly known as:  LIORESAL   Take 2 tablets (20 mg) by mouth 2 times daily   Last time this was given:  20 mg on 11/25/2017  8:37 AM                                cholecalciferol 5000 UNITS Caps capsule   Commonly known as:  vitamin D3   Take 1 capsule (5,000 Units) by mouth daily   Last time this was given:  5,000 Units on 11/25/2017   8:37 AM                                * gabapentin 300 MG capsule   Commonly known as:  NEURONTIN   Take 2 capsules (600 mg) by mouth every morning   Last time this was given:  600 mg on 11/25/2017  8:37 AM                                * gabapentin 300 MG capsule   Commonly known as:  NEURONTIN   Take 3 capsules (900 mg) by mouth At Bedtime   Last time this was given:  600 mg on 11/25/2017  8:37 AM                                LORazepam 1 MG tablet   Commonly known as:  ATIVAN   Take 1 tablet (1 mg) by mouth nightly as needed for anxiety   Last time this was given:  1 mg on 11/24/2017  7:59 PM                                multivitamin, therapeutic with minerals Tabs tablet   Take 1 tablet by mouth daily.   Last time this was given:  1 tablet on 11/25/2017  8:37 AM                                NICOTROL 10 MG Inhaler   Inhale 1 Cartridge into the lungs daily as needed for smoking cessation   Generic drug:  nicotine                                nitroFURantoin macrocrystal 100 MG capsule   Commonly known as:  MACRODANTIN   Take 100 mg by mouth daily UTI prophylaxis   Last time this was given:  100 mg on 11/25/2017  8:37 AM                                omeprazole 40 MG capsule   Commonly known as:  priLOSEC   Take one capsule by mouth daily with breakfast.   Last time this was given:  40 mg on 11/25/2017  5:09 AM                                order for DME   Equipment being ordered: medical lift chair                                oxybutynin 10 MG 24 hr tablet   Commonly known as:  DITROPAN-XL   Take 1 tablet (10 mg) by mouth 2 times daily   Last time this was given:  10 mg on 11/25/2017  8:37 AM                                polyethylene glycol Packet   Commonly known as:  MIRALAX/GLYCOLAX   Take 17 g by mouth daily   Last time this was given:  17 g on 11/9/2017  7:37 AM                                POTASSIUM CHLORIDE PO   Take 99 mg by mouth daily                                senna-docusate 8.6-50  MG per tablet   Commonly known as:  SENOKOT-S;PERICOLACE   Take 2 tablets by mouth 2 times daily   Last time this was given:  2 tablets on 11/19/2017  8:35 PM                                sertraline 100 MG tablet   Commonly known as:  ZOLOFT   Take 1.5 tablets (150 mg) by mouth daily   Last time this was given:  150 mg on 11/24/2017  8:01 PM                                valACYclovir 500 MG tablet   Commonly known as:  VALTREX   Take 1 tablet (500 mg) by mouth 2 times daily                                * Notice:  This list has 2 medication(s) that are the same as other medications prescribed for you. Read the directions carefully, and ask your doctor or other care provider to review them with you.

## 2017-11-06 NOTE — PHARMACY-MEDICATION REGIMEN REVIEW
Pharmacy Medication Regimen Review  Marylee Woods is a 62 year old female who is currently in the Transitional Care Unit.    Assessment: All medications have an appropriate indications, durations and no unnecessary use was found    Plan:   No actions are needed at this time.  Attending provider will be sent usama portillo for review.  If there are any emergent issues noted above, pharmacist will contact provider directly by phone.      Pharmacy will periodically review the resident's medication regimen for any PRN medications not administered in > 72 hours and discontinue them. The pharmacist will discuss gradual dose reductions of psychopharmacologic medications with interdisciplinary team on a regular basis.    Please contact pharmacy if the above does not answer specific medication questions/concerns.    Background:  A pharmacist has reviewed all medications and pertinent medical history today.  Medications were reviewed for appropriate use and any irregularities found are listed with recommendations.      Current Facility-Administered Medications:      amoxicillin (AMOXIL) capsule 500 mg, 500 mg, Oral, Q12H, Basil Bacon MD     [START ON 11/7/2017] teriflunomide (AUBAGIO) tablet 14 mg, 14 mg, Oral, Daily, Basil Bacon MD     [START ON 11/7/2017] cholecalciferol (vitamin D3) capsule CAPS 5,000 Units, 5,000 Units, Oral, Daily, Basil Bacon MD     [START ON 11/7/2017] gabapentin (NEURONTIN) capsule 600 mg, 600 mg, Oral, QAM, Basil Bacon MD     gabapentin (NEURONTIN) capsule 900 mg, 900 mg, Oral, At Bedtime, Basil Bacon MD     [START ON 11/7/2017] hydrochlorothiazide tablet 12.5 mg, 12.5 mg, Oral, Daily, Basil Bacon MD     LORazepam (ATIVAN) tablet 1 mg, 1 mg, Oral, At Bedtime PRN, Basil Bacon MD     multivitamin, therapeutic with minerals (THERA-VIT-M) tablet 1 tablet, 1 tablet, Oral, Daily, Basil Bacon MD     [START ON 11/7/2017] omeprazole (priLOSEC) CR capsule 40 mg, 40 mg, Oral, QAM AC,  Basil Bacon MD     oxybutynin (DITROPAN-XL) 24 hr tablet 10 mg, 10 mg, Oral, BID, Basil Bacon MD     sertraline (ZOLOFT) tablet 150 mg, 150 mg, Oral, QPM, Basil Bacon MD     valACYclovir (VALTREX) tablet 500 mg, 500 mg, Oral, BID PRN, Basil Bacon MD     medication instructions, , Does not apply, Continuous PRN, Basil Bacon MD     acetaminophen (TYLENOL) tablet 650 mg, 650 mg, Oral, Q4H PRN, Basil Bacon MD     acetaminophen (TYLENOL) Suppository 650 mg, 650 mg, Rectal, Q4H PRN, Basil Bacon MD     [START ON 11/7/2017] tuberculin injection 5 Units, 5 Units, Intradermal, Q21 Days, Basil Bacon MD     [START ON 11/9/2017] - Skin Test Reading -, , Does not apply, Q21 Days, Basil Bacon MD     baclofen (LIORESAL) tablet 20 mg, 20 mg, Oral, BID, Basil Bacon MD  No current outpatient prescriptions on file.   PMH: Multiple Sclerosis, acute exacerbation  Strep pharyngitis  HTN  Pancytopenia  Neurogenic Bladder  Mood disorder  Sleep disorder    Renee Abdalla, [place] November 6, 2017

## 2017-11-06 NOTE — DISCHARGE SUMMARY
"[facility]  NEUROLOGY DISCHARGE SUMMARY    Patient Name:  Marylee Woods  MRN:  [id number]      :  1955      Date of Admission: 10/31/2017  Date of Discharge:: 2017  Admitting Physician: Martha Brenner MD  Discharge Physician: Arsh De Leon, PGY-2  Attending Physician: Randy Parmar MD  Primary Care Provider:   Carolina Pulliam  Discharge Disposition:   Discharged to  TCU    Admission Diagnoses:  1. Multiple Sclerosis, acute exacerbation  2. HTN  3. Pancytopenia  4. Neurogenic Bladder  5. Mood disorder  6. Sleep disorder    Discharge Diagnoses:    1. Multiple Sclerosis, acute exacerbation  2. Strep pharyngitis  3. HTN  4. Pancytopenia  5. Neurogenic Bladder  6. Mood disorder  7. Sleep disorder    Brief HPI:   Marylee Woods is a 62 year-old female with a h/o MS who presented in presumed acute exacerbation of her MS with worsening parasthesias, diplopia, and decreased strength. She had been treated with acute exacerbation 2 weeks prior to this admission. Ms. Marinelli notes that she had improved slightly with prior treatment but quickly worsened after completing the \"dose pack\" of steroids prompting her to return for current admission. She was admitted for further workup and management.     Please see H&P dated 10/31/2017 for further details.    Hospital Course:  # Multiple sclerosis, acute exacerbation:  Infectious workup was positive for non-group A strep pharyngitis. CXR and UA negative. MRI with and without contrast was performed of brain and c-spine and were without enhancing lesions. Question whether may be progressing to secondary progressive phase given age and worsening with lack of new enhancing lesions. She was treated for acute exacerbation with 5 days of IV solumedrol with mild improvement.   -Continue home aubagio 14mg qd  -S/P 5 day course of methylprednisolone 1g IV  -Continue home baclofen 20mg BID for spasticity  -Continue vitamin D3 " supplementation  -Continue home omeprazole 40mg qd while on steroids  -Follows with Dr. Redman at [facility]. Recommend following up within 1-2 months or as previously scheduled.       # Pancytopenia:  Patient has been worked up as OP at heme/onc. This is thought to be due to medication side effect from MS drugs or nitrofurantoin. Monitored with daily CBC and remained stable.     # Strep pharyngitis:  Throat culture came back positive with non-group A strep. Patient asymptomatic although was complaining of ear pain on the L on admission.  -Amoxicillin 500mg BID x10 days (end date 11/13/17)     Other conditions were managed with patient's home medications.      Pertinent Investigations:  MRI brain with and w/out contrast:   Impression: The study demonstrates numerous foci of T2-hyperintensity  within the cerebral white matter consistent with the clinical  suspicion of demyelinating disease. There no abnormal enhancement  noted . There are likely increased number of lesions, but direct  comparison is difficult due to motion, especially on the prior study  and different imaging planes compared to prior study.     MRI C-spine  Impression:      1. Few scattered areas of T2 hyperintensities in the cervical spine,  as detailed above, with no definite evidence of corresponding  enhancement. Findings likely related to demyelination foci within the  cervical spine with no definite evidence of active disease. Difficult  to evaluate for progression given the limited prior exams due to  motion artifact.  2. Mild multilevel degenerative changes of the cervical spine.    Consultations:    None    Discharge physical examination:   Vitals: /58 (BP Location: Right arm)  Pulse 76  Temp 96.4  F (35.8  C) (Oral)  Resp 16  Wt 64.5 kg (142 lb 3.2 oz)  LMP  (LMP Unknown)  SpO2 98%  BMI 21.62 kg/m2    EXAM:  General: NAD, cooperative  CV: normal rate, regular rhythm  Lungs: bilaterally clear to  auscultation  Extremities: Distal pulses intact  Skin: No rashes or bruising  Neuro Exam:  Mental Status: Fully alert, attentive and oriented. Speech soft but clear. Able to follow basic commands.    Cranial Nerves: PERRL. EOMI. Facial movements symmetric. Hearing intact to conversation. No dysarthria.   Motor: Antigravity in UE bilaterally, strength in biceps, brachioradialis is 4+/5 on R, 4/5 on L. Hip flexors not antigravity on either.  L hip flexor weaker than R. Dorsiflexors/plantarflexors are 4+/5 bilaterally  Reflexes: 3+ and symmetric in UE/LE, toes upgoing.    Sensory: Decreased light touch sensation on LUE/LLE  Coordination: FNF intact  Station/Gait: Deferred        Discharge Medications:  Current Discharge Medication List      START taking these medications    Details   amoxicillin (AMOXIL) 500 MG capsule Take 1 capsule (500 mg) by mouth every 12 hours for 7 days  Refills: 0    Associated Diagnoses: Strep throat         CONTINUE these medications which have CHANGED    Details   LORazepam (ATIVAN) 1 MG tablet Take 1 tablet (1 mg) by mouth nightly as needed for anxiety  Qty: 60 tablet    Associated Diagnoses: Anxiety      !! gabapentin (NEURONTIN) 300 MG capsule Take 2 capsules (600 mg) by mouth every morning  Qty: 90 capsule    Associated Diagnoses: Paresthesia      !! gabapentin (NEURONTIN) 300 MG capsule Take 3 capsules (900 mg) by mouth At Bedtime  Qty: 90 capsule    Associated Diagnoses: Paresthesia      sertraline (ZOLOFT) 100 MG tablet Take 1.5 tablets (150 mg) by mouth daily  Qty: 135 tablet, Refills: 3    Associated Diagnoses: Major depression in complete remission (H)      hydrochlorothiazide 12.5 MG TABS tablet Take 1 tablet (12.5 mg) by mouth daily  Qty: 90 tablet, Refills: 3    Associated Diagnoses: Essential hypertension, benign      AUBAGIO 14 MG tablet Take 1 tablet (14 mg) by mouth daily  Refills: 11    Associated Diagnoses: MS (multiple sclerosis) (H)      baclofen (LIORESAL) 10 MG tablet  Take 1 tablet (10 mg) by mouth 2 times daily  Qty: 90 tablet    Associated Diagnoses: Preop general physical exam; MS (multiple sclerosis) (H)      oxybutynin (DITROPAN-XL) 10 MG 24 hr tablet Take 1 tablet (10 mg) by mouth 2 times daily  Qty: 30 tablet    Associated Diagnoses: Neurogenic bladder      cholecalciferol (VITAMIN D3) 5000 UNITS CAPS capsule Take 1 capsule (5,000 Units) by mouth daily    Associated Diagnoses: MS (multiple sclerosis) (H)       !! - Potential duplicate medications found. Please discuss with provider.      CONTINUE these medications which have NOT CHANGED    Details   POTASSIUM CHLORIDE PO Take 99 mg by mouth daily      omeprazole (PRILOSEC) 40 MG capsule Take one capsule by mouth daily with breakfast.  Refills: 3      nitrofurantoin macrocrystal (MACRODANTIN) 100 MG capsule Take 100 mg by mouth daily UTI prophylaxis  Refills: 11      NICOTROL 10 MG inhaler Inhale 1 Cartridge into the lungs daily as needed for smoking cessation   Refills: 1      multivitamin, therapeutic with minerals (THERA-VIT-M) TABS Take 1 tablet by mouth daily.    Associated Diagnoses: Anemia      valACYclovir (VALTREX) 500 MG tablet Take 1 tablet (500 mg) by mouth 2 times daily  Qty: 12 tablet, Refills: 5    Associated Diagnoses: Herpes simplex disease      ORDER FOR DME Equipment being ordered: medical lift chair  Qty: 1 Units, Refills: 0    Comments: [facility], fx# [phone]    Associated Diagnoses: MS (multiple sclerosis) (H)             Discharge follow up and instructions:        General info for SNF   Length of Stay Estimate: Short Term Care: Estimated # of Days <30  Condition at Discharge: Stable  Level of care:skilled   Rehabilitation Potential: Fair  Admission H&P remains valid and up-to-date: Yes  Recent Chemotherapy: N/A  Use Nursing Home Standing Orders: N/A     Mantoux instructions   Give two-step Mantoux (PPD) Per Facility Policy Yes     Reason for your hospital stay   You were hospitalized  for MS exacerbation.     Activity - Up with nursing assistance     Follow Up and recommended labs and tests   Follow up with Nursing home physician.  No follow up labs or test are needed.  Follow up with primary care provider in 7 days from discharge from TCU for hospital follow up.  No follow up labs or test are needed.  Follow up with primary neurologist (sees Dr. Redman at [facility]) within 1-2 months of hospital discharge or as previously scheduled.     Physical Therapy Adult Consult   Evaluate and treat as clinically indicated.    Reason:  Patient with MS who has acutely worsened in strength.     Fall precautions     Advance Diet as Tolerated   Follow this diet upon discharge: Orders Placed This Encounter     Regular Diet Adult           Patient was discussed with staff physician, Dr. Dixon.     Personally saw and examined patient. Note made with contribution from Shanell Sandy, MS3, who is acting as a medical scribe.    Arsh De Leon  Neurology PGY-2  P: [phone]    Neurology Staff Addendum  I have seen and evaluated the patient today and discussed with the resident team.  Greater than 30 minutes spent in discharge preparation.    Also discussed case with TCU physician Dr Bacon.        LASHAUN DIXON MD

## 2017-11-06 NOTE — PLAN OF CARE
Problem: Patient Care Overview  Goal: Plan of Care/Patient Progress Review  Outcome: No Change  /58 (BP Location: Right arm)  Pulse 76  Temp 96.4  F (35.8  C) (Oral)  Resp 16  Wt 64.5 kg (142 lb 3.2 oz)  LMP  (LMP Unknown)  SpO2 98%  BMI 21.62 kg/m2  Pt has been avss, kimberly pain. She stayed in bed for entire evening, turned and repositioned per her request. Pt had one episode of urine incontinent. She decline to eat supper. Will continue to monitor.

## 2017-11-06 NOTE — LETTER
Transition Communication Hand-off for Care Transitions to Next Level of Care Provider    Name: Marylee Woods  MRN #: [id number]  Primary Care Provider: Carolina Pulliam     Primary Clinic: [address] [facility] [address]     Reason for Hospitalization:  MS Exhacerbation  Multiple sclerosis exacerbation (H)  Admit Date/Time: 11/6/2017  1:25 PM  Discharge Date: 11/6/17  Payor Source: Payor: BCBS / Plan: [place]BS [place] / Product Type: Indemnity /     Readmission Assessment Measure (LASHAUN) Risk Score/category: Average    Reason for Communication Hand-off Referral: Multiple providers/specialties    Discharge Plan:       Concern for non-adherence with plan of care:   Y/N N  Discharge Needs Assessment:  Needs       Most Recent Value    Equipment Currently Used at Home walker, standard, wheelchair, manual    Transportation Available family or friend will provide          Already enrolled in Tele-monitoring program and name of program:  No  Follow-up specialty is recommended: per discharge orders    Follow-up plan:  Future Appointments  Date Time Provider Department Center   11/7/2017 2:00 PM Anna Brown, OT [facility]   11/8/2017 10:00 AM Bere Waters, PT [facility]   11/8/2017 11:00 AM Ivanna Caballero OT [facility]   11/8/2017 1:00 PM Bere Waters, PT [facility]   11/27/2017 8:00 AM SH INFUSION CHAIR 12 [facility]   11/28/2017 8:00 AM SH INFUSION CHAIR 13 [facility]   11/29/2017 8:00 AM SH INFUSION CHAIR 19 [facility]   11/30/2017 8:00 AM SH INFUSION CHAIR 11 [facility]   12/1/2017 8:00 AM SH INFUSION CHAIR 7 [facility]       Any outstanding tests or procedures:              Key Recommendations:      Cecily Morales    AVS/Discharge Summary is the source of truth; this is a helpful guide for improved communication of patient story

## 2017-11-06 NOTE — PLAN OF CARE
Problem: Patient Care Overview  Goal: Plan of Care/Patient Progress Review  Outcome: No Change  Pt admitted with MS exacerbation. VSS. A&Ox4 but forgetful at times. Neuros unchanged with  BUE 4/5, BLE 2/5 and numbness/tingling to BLE. Baseline w/c bound. On regular diet. Turn q2hrs. Up w/ A2 and lift during the day. Baseline urinary incontinence. Slept well overnight after Tylenol for headache and Ativan for c/o inability to sleep at 0030.  Plan: Continue with POC. Probable d/c today.

## 2017-11-06 NOTE — PLAN OF CARE
Problem: Pain, Acute (Adult)  Goal: Identify Related Risk Factors and Signs and Symptoms  Related risk factors and signs and symptoms are identified upon initiation of Human [employer] Clinical Practice Guideline (CPG).   Outcome: Improving  VSS. Headache managed with tylenol. Neuros unchanged; BUE 4/5 BLE 2/5. Slightly stronger on right side. Numbness in BLE that is baseline. Good po intake, pt is able to feed self. Inc of urine x 2, had BM today. Repositioned every 2 hours. Pt was able to pivot with assist of 1 to . Pt is being discharged to [facility] at this time[facility] via her own WC.

## 2017-11-06 NOTE — PLAN OF CARE
Problem: Goal Outcome Summary  Goal: Goal Outcome Summary  Outcome: No Change  Pt arrived on floor approximately 1330. Alert, oriented, able to make needs known. Fall precautions r/t MS diagnosis. Writer started Adult Profile flowsheet. Not able to complete profile and admission MDS assessment and communicated that to evening shift RN. Completed skin assessment. No pressure injuries noted. Blanchable redness on heels; encouraged pt to elevate heels with pillow. Pt refused flu immunization r/t MS infusion she will be having. Evening RN will finish admission process.

## 2017-11-07 LAB
ANION GAP SERPL CALCULATED.3IONS-SCNC: 5 MMOL/L (ref 3–14)
BUN SERPL-MCNC: 27 MG/DL (ref 7–30)
CALCIUM SERPL-MCNC: 8.3 MG/DL (ref 8.5–10.1)
CHLORIDE SERPL-SCNC: 105 MMOL/L (ref 94–109)
CO2 SERPL-SCNC: 34 MMOL/L (ref 20–32)
CREAT SERPL-MCNC: 0.8 MG/DL (ref 0.52–1.04)
GFR SERPL CREATININE-BSD FRML MDRD: 73 ML/MIN/1.7M2
GLUCOSE SERPL-MCNC: 83 MG/DL (ref 70–99)
POTASSIUM SERPL-SCNC: 3.2 MMOL/L (ref 3.4–5.3)
POTASSIUM SERPL-SCNC: 3.8 MMOL/L (ref 3.4–5.3)
SODIUM SERPL-SCNC: 144 MMOL/L (ref 133–144)

## 2017-11-07 PROCEDURE — 99207 ZZC NO CHARGE VISIT/PATIENT NOT SEEN: CPT | Performed by: INTERNAL MEDICINE

## 2017-11-07 PROCEDURE — 36415 COLL VENOUS BLD VENIPUNCTURE: CPT | Performed by: INTERNAL MEDICINE

## 2017-11-07 PROCEDURE — 97530 THERAPEUTIC ACTIVITIES: CPT | Mod: GP

## 2017-11-07 PROCEDURE — 36416 COLLJ CAPILLARY BLOOD SPEC: CPT | Performed by: INTERNAL MEDICINE

## 2017-11-07 PROCEDURE — 84132 ASSAY OF SERUM POTASSIUM: CPT | Performed by: INTERNAL MEDICINE

## 2017-11-07 PROCEDURE — 12000022 ZZH R&B SNF

## 2017-11-07 PROCEDURE — 80048 BASIC METABOLIC PNL TOTAL CA: CPT | Performed by: INTERNAL MEDICINE

## 2017-11-07 PROCEDURE — 99221 1ST HOSP IP/OBS SF/LOW 40: CPT | Performed by: PSYCHIATRY & NEUROLOGY

## 2017-11-07 PROCEDURE — 25000125 ZZHC RX 250: Performed by: INTERNAL MEDICINE

## 2017-11-07 PROCEDURE — 97110 THERAPEUTIC EXERCISES: CPT | Mod: GP

## 2017-11-07 PROCEDURE — 25000132 ZZH RX MED GY IP 250 OP 250 PS 637: Performed by: INTERNAL MEDICINE

## 2017-11-07 PROCEDURE — 40000193 ZZH STATISTIC PT WARD VISIT

## 2017-11-07 PROCEDURE — 97163 PT EVAL HIGH COMPLEX 45 MIN: CPT | Mod: GP

## 2017-11-07 RX ORDER — POTASSIUM CHLORIDE 750 MG/1
20-40 TABLET, EXTENDED RELEASE ORAL
Status: DISCONTINUED | OUTPATIENT
Start: 2017-11-07 | End: 2017-11-25 | Stop reason: HOSPADM

## 2017-11-07 RX ORDER — POTASSIUM CHLORIDE 29.8 MG/ML
20 INJECTION INTRAVENOUS
Status: DISCONTINUED | OUTPATIENT
Start: 2017-11-07 | End: 2017-11-25 | Stop reason: HOSPADM

## 2017-11-07 RX ORDER — POTASSIUM CHLORIDE 7.45 MG/ML
10 INJECTION INTRAVENOUS
Status: DISCONTINUED | OUTPATIENT
Start: 2017-11-07 | End: 2017-11-25 | Stop reason: HOSPADM

## 2017-11-07 RX ORDER — POTASSIUM CL/LIDO/0.9 % NACL 10MEQ/0.1L
10 INTRAVENOUS SOLUTION, PIGGYBACK (ML) INTRAVENOUS
Status: DISCONTINUED | OUTPATIENT
Start: 2017-11-07 | End: 2017-11-25 | Stop reason: HOSPADM

## 2017-11-07 RX ORDER — POTASSIUM CHLORIDE 1.5 G/1.58G
20-40 POWDER, FOR SOLUTION ORAL
Status: DISCONTINUED | OUTPATIENT
Start: 2017-11-07 | End: 2017-11-25 | Stop reason: HOSPADM

## 2017-11-07 RX ADMIN — ACETAMINOPHEN 650 MG: 325 TABLET, FILM COATED ORAL at 11:42

## 2017-11-07 RX ADMIN — MULTIPLE VITAMINS W/ MINERALS TAB 1 TABLET: TAB at 07:39

## 2017-11-07 RX ADMIN — POTASSIUM CHLORIDE 20 MEQ: 750 TABLET, FILM COATED, EXTENDED RELEASE ORAL at 11:39

## 2017-11-07 RX ADMIN — SERTRALINE HYDROCHLORIDE 150 MG: 100 TABLET ORAL at 20:38

## 2017-11-07 RX ADMIN — GABAPENTIN 900 MG: 300 CAPSULE ORAL at 20:37

## 2017-11-07 RX ADMIN — AMOXICILLIN 500 MG: 500 CAPSULE ORAL at 07:39

## 2017-11-07 RX ADMIN — OXYBUTYNIN CHLORIDE 10 MG: 10 TABLET, FILM COATED, EXTENDED RELEASE ORAL at 07:39

## 2017-11-07 RX ADMIN — Medication 5 UNITS: at 09:16

## 2017-11-07 RX ADMIN — BACLOFEN 20 MG: 20 TABLET ORAL at 20:39

## 2017-11-07 RX ADMIN — AMOXICILLIN 500 MG: 500 CAPSULE ORAL at 20:38

## 2017-11-07 RX ADMIN — CHOLECALCIFEROL CAP 125 MCG (5000 UNIT) 5000 UNITS: 125 CAP at 07:39

## 2017-11-07 RX ADMIN — ACETAMINOPHEN 650 MG: 325 TABLET, FILM COATED ORAL at 16:13

## 2017-11-07 RX ADMIN — BACLOFEN 20 MG: 20 TABLET ORAL at 07:39

## 2017-11-07 RX ADMIN — POTASSIUM CHLORIDE 40 MEQ: 750 TABLET, FILM COATED, EXTENDED RELEASE ORAL at 09:17

## 2017-11-07 RX ADMIN — HYDROCHLOROTHIAZIDE 12.5 MG: 12.5 TABLET ORAL at 07:39

## 2017-11-07 RX ADMIN — ACETAMINOPHEN 650 MG: 325 TABLET, FILM COATED ORAL at 07:39

## 2017-11-07 RX ADMIN — TERIFLUNOMIDE 14 MG: 14 TABLET, FILM COATED ORAL at 07:40

## 2017-11-07 RX ADMIN — GABAPENTIN 600 MG: 300 CAPSULE ORAL at 07:39

## 2017-11-07 RX ADMIN — LORAZEPAM 1 MG: 0.5 TABLET ORAL at 20:38

## 2017-11-07 RX ADMIN — OXYBUTYNIN CHLORIDE 10 MG: 10 TABLET, FILM COATED, EXTENDED RELEASE ORAL at 16:13

## 2017-11-07 RX ADMIN — OMEPRAZOLE 40 MG: 20 CAPSULE, DELAYED RELEASE ORAL at 06:36

## 2017-11-07 ASSESSMENT — ACTIVITIES OF DAILY LIVING (ADL): PREVIOUS_RESPONSIBILITIES: MEAL PREP;MEDICATION MANAGEMENT

## 2017-11-07 NOTE — PLAN OF CARE
Problem: Goal Outcome Summary  Goal: Goal Outcome Summary  PT eval completed today. Pt is below baseline for functional mobility d/t recent MS exacerbation. She is limited by decreased core and BLE strength, coordination, balance, and sensation. Also with complicating factors of baseline BLE lymphedema and dizziness with positional changes.      She will benefit from skilled PT to improve strength, balance, functional transfers and mobility to safely return to home with . She will have assist with AM/PM cares, but is alone through most of the day. Will need to be MOD I with transfers, w/c mobility, and toileting to be able to return to home safely.      ELOS 2 weeks to achieve therapy goals.

## 2017-11-07 NOTE — CONSULTS
"REQUESTING PHYSICIAN:  Dr. Mary Sahu.      IDENTIFYING INFORMATION:  Marylee Woods is a 62-year-old  female who is here for MS exacerbation.  She has also other medical problems, GERD, pancytopenia.  She is having streptococcal pharyngitis, hypertension, DVT.  She is here for acute exacerbation for strength improvement.      HISTORY OF PRESENT ILLNESS:  The patient has depression and she is on Zoloft 100 mg.  She has a psychiatrist on the outside, who wants the patient to take 150 mg, but she is only on 100 mg of it.  More recently she is having more anxiety.  She says she is not sleeping.  She feels like nobody is giving any questions.  She feels overwhelmed.  She is worried about if she will be able to make up for the new medications that she is supposed to take care of.  The patient says that her stomach is jumpy, her ears are buzzing.  She feels anxious about the whole situation.      The patient does not have any suicidal ideation, does not have any auditory or visual hallucinations.  She states that she has depression.  Her depression is better now, but when she gets depressed she has lack of sleep, lack of energy, lack of motivation.  She is on Zoloft 100 mg.      PAST PSYCHIATRIC HISTORY:  Depression started in 1993.  She believes that she had MS symptoms in 1970s and 1993 was when she was actually diagnosed, that is when her depression started.  She was initially on Prozac and now is on Zoloft.  She that there was a time when she went off the antidepressants and went \"bat shit crazy.\"  When she is not taking medications,  she becomes depressed, tearful, hopeless, neglects self-health care and gets anxious in the past.  She does not use any drugs or alcohol.      FAMILY HISTORY:  She believes both her kids have depression and drug use is there in her family.      PHYSICAL EXAMINATION:     VITAL SIGNS:  Temperature of 98.3, pulse of 78, heart rate of 70, respiration rate of 18, blood pressure " 128/69.     Please review the detailed physical examination and review of systems done on this patient by Dr. Bacon on 2017.      SOCIAL HISTORY:  Born in [place], good childhood.  Parents .  Parents are Rastafarian and it was hard growing up.  She completed high school and worked as a .  She believes that her  is depressed and this adds further stress.      MENTAL STATUS EXAMINATION:  The patient is a 62-year-old  female who is lying in bed.  She is quite dramatic in her presentation.  Gait not tested.  Poor eye contact.  She keeps her eyes closed and then opens up at times.  Mood is described as anxious.  She appears irritable.  Affect is congruent.  Speech is spontaneous, normal rate.  Linear thought process, no loosening of association.  Does not have any suicidal ideation, denied auditory hallucinations.  Alert and oriented x3.  Recent and remote memory, language, fund of knowledge are all adequate.      DIAGNOSES:   Axis I:  Major depressive disorder, recurrent, without psychotic features.       Unspecified anxiety disorder.       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      RECOMMENDATIONS:  The patient is reluctant for me to increase her Zoloft to 150 mg.  She is willing to take gabapentin as needed.  Will go with gabapentin 100 mg twice a day as needed.  These recommendations given to Dr. Mary Sahu.      Thank you for this interesting consult.         BRICE ROCK MD             D: 2017 14:16   T: 2017 15:42   MT: CD      Name:     WOODS, MARYLEE   MRN:      [id number]        Account:       [id number]   :      1955           Consult Date:  2017      Document: [id number]       cc: Mary Sahu MD

## 2017-11-07 NOTE — PLAN OF CARE
Problem: Goal Outcome Summary  Goal: Goal Outcome Summary  OT-OT attempted to see pt for pm session for completion of eval and pt declined due to fatigue. Pt re-scheduled for eval on 11/8/17.

## 2017-11-07 NOTE — PROGRESS NOTES
11/07/17 0700   Quick Adds   Quick Adds Certification   Type of Visit Initial PT Evaluation   Living Environment   Lives With spouse   Living Arrangements house   Home Accessibility no concerns   Number of Stairs to Enter Home 0  (Ramp)   Number of Stairs Within Home 12   Stair Railings at Home (Pt does not use stairs)   Transportation Available family or friend will provide;car   Living Environment Comment Per chart: Patient lives in [place] with her , Manuel, and a roommate, Chiki who lives upstairs.   works full time.  There are no other support people besides her  available to provide any additional care upon discharge.  Their house has a ramp to enter and then it is 2 levels with main bedroom and full bath on main level.   Self-Care   Dominant Hand right   Usual Activity Tolerance moderate   Current Activity Tolerance fair   Regular Exercise yes   Activity/Exercise Type other (see comments)  (Physical therapy)   Equipment Currently Used at Home walker, standard;wheelchair, manual   Functional Level Prior   Ambulation 1-->assistive equipment   Transferring 1-->assistive equipment   Toileting 1-->assistive equipment   Bathing 1-->assistive equipment   Dressing 0-->independent   Eating 0-->independent   Communication 0-->understands/communicates without difficulty   Swallowing 0-->swallows foods/liquids without difficulty   Cognition 0 - no cognition issues reported   Fall history within last six months yes   Number of times patient has fallen within last six months 1   Which of the above functional risks had a recent onset or change? ambulation   Prior Functional Level Comment Per chart: She primarily used a wheelchair for mobility and would use a walker occasionally to walk short distances (~25 ft) if her  was available to walk with her when she used the walker due to fear of falling/poor balance.  She reports that she has been using the wheelchair more and more over time as she  "has been \"being lazy at home\" and not keeping up with her maintenance exercises.  She could transfers in/out of the wheelchair independently.  She was independent with ADLs at times, but on days when she felt fatigued or was feeling \"lazy\" she would require assistance (though she reported to PT that she had been needing bed baths).  Managed medications independently.   manages finances.   General Information   Onset of Illness/Injury or Date of Surgery - Date 10/31/17   Referring Physician Dr. Vikash Alvares   Patient/Family Goals Statement Pt wants to return to home - w/c based and ambulatory as able   Pertinent History of Current Problem (include personal factors and/or comorbidities that impact the POC) Per chart:  62 year-old female with a h/o MS admitted to neurology service from 10/31- 11/6 with presumed acute exacerbation of her MS with worsening parasthesias, diplopia, and decreased strength. Found to have strep  Throat and had Abx and course of steroid with some improvement -MRI with and without contrast was performed of brain and c-spine and were without enhancing lesions. Question whether may be progressing to secondary progressive phase given age and worsening with lack of new enhancing lesions. (Please see previous d/c summary for details). Now transferred to TCU for continued rehab.    Precautions/Limitations fall precautions   Weight-Bearing Status - LUE full weight-bearing   Weight-Bearing Status - RUE full weight-bearing   Weight-Bearing Status - LLE full weight-bearing   Weight-Bearing Status - RLE full weight-bearing   General Observations Pt is supine in bed at start of session, c/o \"ankles are cold\" - assisted to turn up heat in room for comfort   Cognitive Status Examination   Orientation orientation to person, place and time   Level of Consciousness alert   Follows Commands and Answers Questions 100% of the time   Personal Safety and Judgment intact   Memory intact   Cognitive Comment No " "initial concerns - will defer to OT for formal assessment   Pain Assessment   Patient Currently in Pain No   Integumentary/Edema   Integumentary/Edema Comments Pt with 2+ BLE edema in feet, ankles, and calves - assisted to don compression stockings    Posture    Posture Comments Poor core activation in sitting with kyphotic posture and forward head and shoulders   Range of Motion (ROM)   ROM Comment All BLE PROM WFL, tightness in B calves, AROM limited by weakness in all BLE joints.    Strength   Strength Comments Significant weakness in BLE musculature L>R. <3/5 in all major muscle groups, requiring active assist for all motions. L generally 1+/5 and R generally 2+/5.    Bed Mobility   Bed Mobility Comments Min A to roll to L, MOD I to R with rails from elevated bed. Supine > sit min A, sit > supine MOD A to manage BLE.   Transfer Skills   Transfer Comments Unable to initiate transfers today d/t significant dizziness while sitting EOB. States she has \"tunnel vision.\" Pt able to scoot 4 ft laterally with supervision and extra time at EOB.    Gait   Gait Comments Not safe at this time.    Balance   Balance Comments Multiple LOB in sitting requiring MOD A to right self.   Sensory Examination   Sensory Perception Comments C/o altered sensation and \"numbness\" in BLE from waist down. Impaired light touch, deep pressure, and prioprioception.    Coordination   Coordination Comments Decreased coordination throughout body - slow reaction times and impaired balance reactions.    Muscle Tone   Muscle Tone Comments decreased resting and active tone throughout BLE.    Modality Interventions   Planned Modality Interventions Comments None indicated at this time.    General Therapy Interventions   Planned Therapy Interventions ROM;strengthening;stretching;transfer training;progressive activity/exercise;home program guidelines;risk factor education;balance training;bed mobility training;gait training;neuromuscular " re-education;wheelchair management/propulsion training   Intervention Comments Pt is below baseline for functional mobility d/t recent MS exacerbation. She is limited by decreased core and BLE strength, coordination, balance, and sensation. Also with complicating factors of baseline BLE lymphedema and dizziness with positional changes. She will benefit from skilled PT to improve strength, balance, functional transfers and mobility to safely return to home with . She will have assist with AM/PM cares, but is alone through most of the day. Will need to be MOD I with transfers, w/c mobility, and toileting to be able to return to home safely.    Clinical Impression   Criteria for Skilled Therapeutic Intervention yes, treatment indicated   PT Diagnosis deconditioning, BLE weakness, decreased coordination   Influenced by the following impairments As listed above   Functional limitations due to impairments Needs assist for all mobility at this time - unable to return to home    Clinical Presentation Unstable/Unpredictable   Clinical Presentation Rationale Significant weakness and decreased functional mobility below baseline complicated by edema, baseline deconditioning, and dizziness.    Clinical Decision Making (Complexity) High complexity   Therapy Frequency` daily   Predicted Duration of Therapy Intervention (days/wks) 2 weeks   Anticipated Equipment Needs at Discharge (TBD)   Anticipated Discharge Disposition Home with Assist;Home with Outpatient Therapy  (would like to continue with [facility] rehab in [place])   Risk & Benefits of therapy have been explained Yes   Patient, Family & other staff in agreement with plan of care Yes   Therapy Certification   Start of care date 11/07/17   Certification date from 11/07/17   Certification date to 12/06/17   Medical Diagnosis Multiple sclerosis exacerbation    Certification I certify the need for these services furnished under this plan of treatment and while under my  care.  (Physician co-signature of this document indicates review and certification of the therapy plan).    Total Evaluation Time   Total Evaluation Time (Minutes) 25

## 2017-11-07 NOTE — PLAN OF CARE
Problem: Patient Care Overview  Goal: Plan of Care/Patient Progress Review  Physical Therapy Discharge Summary     Reason for therapy discharge:    Discharged to transitional care facility.     Progress towards therapy goal(s). [facility] for goal details.  Goals not met.  Barriers to achieving goals:   discharge from facility.     Therapy recommendation(s):    Continued therapy is recommended.  Rationale/Recommendations:  continue transfer training to increase (I) with standing transfers.  Increase LE strength.  Gait training for household distances.

## 2017-11-07 NOTE — PROGRESS NOTES
Social Work: Initial Assessment with Discharge Plan    Patient Name: Marylee Woods  : 1955  Age: 62 year old  MRN: [id number]  Completed assessment with: Pt  Admitted to TCU: 2017    Presenting Information   Date of SW assessment: 2017  Health Care Directive: Patient considering completing  Primary Health Care Agent: default to next ofkin  Secondary Health Care Agent: NA  Living Situation: Pt lives with her  and cat in [place]  Previous Functional Status: Pt stated she does a lot in her wheelchair and that is her baseline.   DME available: Wheelchair and walker.   Patient and family understanding of hospitalization: Pt stated that she is here to gain her strength before she returns back home.   Cultural/Language/Spiritual Considerations: English speaking.   Abuse concerns: No concerns.   BIMS: Pt scored 15 on BIMS indicating cognitively intact.   PHQ-9: Pt scored 02 on PHQ-9 indicating minimal depression.   PAS: confirmation number- [id number]  Has there been a level II screen?  No  Were there any recommendations in the screen? N/A  If yes, will the recommendations we incorporated into the Plan of Care?  N/A  Physical Health  Reason for admission: Multiple sclerosis exacerbation (H)    Provider Information   Primary Care Physician:Carolina Pulliam   : ISABELLE    Mental Health:   Diagnosis: Anxiety  Current Support/Services: Medications  Previous Services: Medications  Services Needed/Recommended: Pt will continue to take her medications as prescribed by a doctor.     Substance Use:  Diagnosis: None  Current Support/Services: NA  Previous Services: NA  Services Needed/Recommended: Pt indicated that she was a heavy smoker and she still craves it, but does not want any cessation tools to help with cravings.     Support System  Marital Status:    Family support: Pt has the support of her  and adult children.   Other support available: Roommate Chiki- helps from  time to time.   Gaps in support system: No apparent gaps.     Community Resources  Current in home services: None indicated   Previous services: None    Financial/Employment/Education  Employment Status: Disabled  Income Source: SSDI  Education: College  Financial Concerns:  No concerns.   Insurance: BCBS/ Medicare PT A only      Discharge Plan   Patient and family discharge goal: Pt's goal is to return home with her   Provided Education on discharge plan: YES  Patient agreeable to discharge plan:  YES    Barriers to discharge: Medical clearance, safe DC plan, complete therapy goals.     Discharge Recommendations   Disposition: Home with continued support.   Transportation Needs: Family will provide.   Name of Transportation Company and Phone: NA    Additional comments   Pt enjoys [place] and is looking forward to flower group that is held on Wednesdays.        11/07/17 1300   Living Arrangements   Lives With spouse;other (see comments)   Living Arrangements house   Able to Return to Prior Living Arrangements yes   Home Safety   Patient Feels Safe Living in Home? yes   Discharge Planning   Expected Discharge Date (TBD)   Discharge Needs Assessment   Patient/family verbalizes understanding of discharge plan recommendations? Yes   Equipment Currently Used at Home walker, standard;wheelchair, manual   Transportation Available car;family or friend will provide      STEPHANY Beltran  [facility]   P: [phone]  Pgr: [phone]

## 2017-11-07 NOTE — UTILIZATION REVIEW
Per pt interview:  Level of Schooling:college  Ethnicity:  Marital Status:  Dentures: Yes - full  Hearing Aid: No  Smoker:  No  Glasses: Yes  Occupation: disabled  Falls 0-1 mo: 1 2-6 mo: 0   Advanced Care Directive Referral to Social Work?No

## 2017-11-07 NOTE — PLAN OF CARE
Problem: Goal Outcome Summary  Goal: Goal Outcome Summary  Alert and oriented x4. C/O headache and tylenol 650 mg administered with some relief. Ativan 1 mg for anxiety. Oxygen at 2L/NC at NOC,  Patient states she uses oxygen at NOC instead of her CPAP. HO was paged and there's an order for oxygen use. Patient incontinent of bladder. Continue with current POC.

## 2017-11-07 NOTE — PLAN OF CARE
Problem: Goal Outcome Summary  Goal: Goal Outcome Summary  Outcome: No Change  Patient slept most of this shift, no complained of pain or discomfort, using supplemental oxygen at 2 liter via nasal cannula, vitals /76 (BP Location: Right arm)  Temp 98.3  F (36.8  C) (Oral)  Resp 16  Wt 63.2 kg (139 lb 4.8 oz)  LMP  (LMP Unknown)  SpO2 97%  BMI 21.18 kg/m2, reports headaches since admissions to hospital but states she is able to tolerate, wll request for prn Tylenol when in need. Patient incontinent of bladder, no BM this shift, call light is within reach, will continue to monitor

## 2017-11-07 NOTE — PLAN OF CARE
Problem: Individualization  Goal: Patient Preferences  Outcome: Improving            VS:    Afebrile, vitals stable, uses oxygen at 2 liters nasal cannula at night instead of CPAP. Oxygen off during waking hours.   Output:    Incontinent of urine. Last bm 11-6-17   Activity:    Wheelchair based    Skin: +2 pitting edema in lower extremities   Pain:    Ongoing headache.  Tylenol given with some relief.   CMS:    Baseline numbness in legs and feet.   Dressing:    None present.   Diet:    Regular with set up.    LDA:    None present    Equipment:    Walker, gait belt, wheelchair   Plan:    Hopefully home.  works during the day.    Additional Info:    Extremely anxious at times. Dr. Moore to make recommendations.

## 2017-11-07 NOTE — PROGRESS NOTES
H and P reviewed from last night   Spoke with  Manuel over phone, patients MS nurse Ekta and patient  Continue same treatment for now with abx, PT and OT   Psychiatry consult for anxiety   Manuel to update sister Raj

## 2017-11-08 ENCOUNTER — CARE COORDINATION (OUTPATIENT)
Dept: CARE COORDINATION | Facility: CLINIC | Age: 62
End: 2017-11-08

## 2017-11-08 LAB — POTASSIUM SERPL-SCNC: 4 MMOL/L (ref 3.4–5.3)

## 2017-11-08 PROCEDURE — 25000132 ZZH RX MED GY IP 250 OP 250 PS 637: Performed by: INTERNAL MEDICINE

## 2017-11-08 PROCEDURE — 97535 SELF CARE MNGMENT TRAINING: CPT | Mod: GO

## 2017-11-08 PROCEDURE — 40000133 ZZH STATISTIC OT WARD VISIT

## 2017-11-08 PROCEDURE — 36416 COLLJ CAPILLARY BLOOD SPEC: CPT | Performed by: INTERNAL MEDICINE

## 2017-11-08 PROCEDURE — 97166 OT EVAL MOD COMPLEX 45 MIN: CPT | Mod: GO

## 2017-11-08 PROCEDURE — 84132 ASSAY OF SERUM POTASSIUM: CPT | Performed by: INTERNAL MEDICINE

## 2017-11-08 PROCEDURE — 40000193 ZZH STATISTIC PT WARD VISIT: Performed by: PHYSICAL THERAPIST

## 2017-11-08 PROCEDURE — 12000022 ZZH R&B SNF

## 2017-11-08 PROCEDURE — 97112 NEUROMUSCULAR REEDUCATION: CPT | Mod: GP | Performed by: PHYSICAL THERAPIST

## 2017-11-08 PROCEDURE — 97530 THERAPEUTIC ACTIVITIES: CPT | Mod: GP | Performed by: PHYSICAL THERAPIST

## 2017-11-08 RX ORDER — BACLOFEN 10 MG/1
10-20 TABLET ORAL 2 TIMES DAILY PRN
Status: DISCONTINUED | OUTPATIENT
Start: 2017-11-08 | End: 2017-11-25 | Stop reason: HOSPADM

## 2017-11-08 RX ORDER — CALCIUM CARBONATE 500 MG/1
500 TABLET, CHEWABLE ORAL DAILY PRN
Status: DISCONTINUED | OUTPATIENT
Start: 2017-11-08 | End: 2017-11-25 | Stop reason: HOSPADM

## 2017-11-08 RX ORDER — POLYETHYLENE GLYCOL 3350 17 G/17G
17 POWDER, FOR SOLUTION ORAL DAILY
Status: DISCONTINUED | OUTPATIENT
Start: 2017-11-08 | End: 2017-11-25 | Stop reason: HOSPADM

## 2017-11-08 RX ORDER — AMOXICILLIN 250 MG
2 CAPSULE ORAL 2 TIMES DAILY
Status: DISCONTINUED | OUTPATIENT
Start: 2017-11-08 | End: 2017-11-25 | Stop reason: HOSPADM

## 2017-11-08 RX ORDER — GABAPENTIN 600 MG/1
600 TABLET ORAL 2 TIMES DAILY PRN
Status: DISCONTINUED | OUTPATIENT
Start: 2017-11-08 | End: 2017-11-25 | Stop reason: HOSPADM

## 2017-11-08 RX ADMIN — LORAZEPAM 1 MG: 0.5 TABLET ORAL at 20:42

## 2017-11-08 RX ADMIN — SERTRALINE HYDROCHLORIDE 150 MG: 100 TABLET ORAL at 20:43

## 2017-11-08 RX ADMIN — OXYBUTYNIN CHLORIDE 10 MG: 10 TABLET, FILM COATED, EXTENDED RELEASE ORAL at 20:46

## 2017-11-08 RX ADMIN — TERIFLUNOMIDE 14 MG: 14 TABLET, FILM COATED ORAL at 09:06

## 2017-11-08 RX ADMIN — AMOXICILLIN 500 MG: 500 CAPSULE ORAL at 20:45

## 2017-11-08 RX ADMIN — POLYETHYLENE GLYCOL 3350 17 G: 17 POWDER, FOR SOLUTION ORAL at 09:11

## 2017-11-08 RX ADMIN — AMOXICILLIN 500 MG: 500 CAPSULE ORAL at 09:05

## 2017-11-08 RX ADMIN — OMEPRAZOLE 40 MG: 20 CAPSULE, DELAYED RELEASE ORAL at 06:09

## 2017-11-08 RX ADMIN — SENNOSIDES AND DOCUSATE SODIUM 2 TABLET: 8.6; 5 TABLET ORAL at 20:46

## 2017-11-08 RX ADMIN — GABAPENTIN 900 MG: 300 CAPSULE ORAL at 20:42

## 2017-11-08 RX ADMIN — OXYBUTYNIN CHLORIDE 10 MG: 10 TABLET, FILM COATED, EXTENDED RELEASE ORAL at 09:05

## 2017-11-08 RX ADMIN — CHOLECALCIFEROL CAP 125 MCG (5000 UNIT) 5000 UNITS: 125 CAP at 09:05

## 2017-11-08 RX ADMIN — BACLOFEN 20 MG: 20 TABLET ORAL at 09:05

## 2017-11-08 RX ADMIN — BACLOFEN 20 MG: 20 TABLET ORAL at 16:59

## 2017-11-08 RX ADMIN — SENNOSIDES AND DOCUSATE SODIUM 2 TABLET: 8.6; 5 TABLET ORAL at 09:11

## 2017-11-08 RX ADMIN — GABAPENTIN 600 MG: 300 CAPSULE ORAL at 09:04

## 2017-11-08 RX ADMIN — ACETAMINOPHEN 650 MG: 325 TABLET, FILM COATED ORAL at 16:29

## 2017-11-08 RX ADMIN — ACETAMINOPHEN 650 MG: 325 TABLET, FILM COATED ORAL at 06:09

## 2017-11-08 RX ADMIN — ACETAMINOPHEN 650 MG: 325 TABLET, FILM COATED ORAL at 20:42

## 2017-11-08 RX ADMIN — MULTIPLE VITAMINS W/ MINERALS TAB 1 TABLET: TAB at 09:05

## 2017-11-08 NOTE — PROGRESS NOTES
Social Work Services Progress Note    Hospital Day: 3  Date of Initial Social Work Evaluation:  11/7/2017  Collaborated with:  [facility] [phone]    Data:  Linda is a 62 year old female, admitted to the TCU on 11/6/2017    Intervention:  Provider Call    Assessment:      Writer received a call from [facility] Care Coordinator from the [facility] location; asking how the pt is doing. Writer gave a brief update and will update Aminata on a daily basis until the pt is discharged. Aminata agreeable to this plan.     Plan:    Anticipated Disposition:  Home with services    Barriers to d/c plan:  Medical clearance, safe DC plan, complete therapy goals.     Follow Up:  SW will continue to follow and assist as needed.    STEPHANY Beltran  TCU   P: [phone]  Pgr: [phone]

## 2017-11-08 NOTE — PLAN OF CARE
Problem: Goal Outcome Summary  Goal: Goal Outcome Summary  Outcome: No Change  Patient sleeping between cares, incontinent of bladder, was turned and repositioned every 2 hrs and changed when incontinent of urine, no bowel movement noted this shift. Patient using supplemental oxygen at 2 liter via nasal cannula, denies shortness of breath, states she usually use C-pap at home but prefers O2, patient reports headache cont, decline pain medication this shift. Call light is within reach, she is not using call light, staff will continue to check frequently and anticipate patient needs.    PRN tylenol given at 0600 for headache, patient also requesting for prn medication for muscle spasm, none order in MAR, note in for Physician to address.

## 2017-11-08 NOTE — PLAN OF CARE
Problem: Goal Outcome Summary  Goal: Goal Outcome Summary  Outcome: No Change  Alert and oriented x 4, c/o headache gave tylenol around 1600, pt was anxious and c/o SOB, o2 sat was 91 % room air gave her oxygen 2L o2 was 97%, good appetite and fluid intake, done turn and reposition, no BM incontinent of urine, refused to use CPAP at bed time, gave ativan at bed time.

## 2017-11-08 NOTE — PLAN OF CARE
Problem: Goal Outcome Summary  Goal: Goal Outcome Summary  OT: Initial eval completed and tx initiated. estimated length of stay 2-3 weeks and assist at home by  as needed. Pt has all equipment for bathroom.

## 2017-11-08 NOTE — PLAN OF CARE
Problem: Goal Outcome Summary  Goal: Goal Outcome Summary  CGA/min A slide board transfer w/c<>bed. Pt eager to get back home; wants to be walking some as before. Per pt bathroom is not w/c accessible.

## 2017-11-08 NOTE — PROGRESS NOTES
11/07/17 0742   Quick Adds   Quick Adds Certification   Type of Visit Initial Occupational Therapy Evaluation   Living Environment   Lives With spouse   Living Arrangements house   Home Accessibility no concerns   Number of Stairs to Enter Home 0  (ramp to get into the house)   Number of Stairs Within Home 12   Stair Railings at Home other (see comments)  (pt has stairs but does not use them)   Transportation Available family or friend will provide   Living Environment Comment OT: pt lives with her , has a ramp into the house and pt stays in the main level. pt only does sponge  and has a bathroom on the main level. Pt has a higher toilet, grab bar on one side but  is getting another bar for the other side.    Self-Care   Dominant Hand right   Usual Activity Tolerance moderate   Current Activity Tolerance fair   Regular Exercise yes   Activity/Exercise Type other (see comments)  (physical therapy twice weekly)   Exercise Amount/Frequency 2 times/wk   Equipment Currently Used at Home walker, standard;grab bar;wheelchair, manual   Functional Level Prior   Ambulation 1-->assistive equipment   Transferring 1-->assistive equipment   Toileting 1-->assistive equipment   Bathing 1-->assistive equipment   Dressing 0-->independent   Eating 0-->independent   Communication 0-->understands/communicates without difficulty   Swallowing 0-->swallows foods/liquids without difficulty   Cognition 0 - no cognition issues reported   Fall history within last six months yes   Number of times patient has fallen within last six months 1  (legs gave out)   Which of the above functional risks had a recent onset or change? ambulation;transferring;toileting;bathing;dressing   Prior Functional Level Comment OT; PT was mod I for adls but did state that  sometimes helped with pulling jeans over buttocks. pt primarily uses w/c as a mode of trasnportation but does use RW for transfers and short distance mobility to bathroom.    "      Present no   General Information   Onset of Illness/Injury or Date of Surgery - Date 10/31/17   Referring Physician Dr. Bacon   Patient/Family Goals Statement OT: per pt, \" i want to be able to go home, walk more than i do so i can plush blanket.\"   Additional Occupational Profile Info/Pertinent History of Current Problem OT: per MD charts, \" 62 year-old female with a h/o MS admitted to neurology service from 10/31- 11/6 with presumed acute exacerbation of her MS with worsening parasthesias, diplopia, and decreased strength. Found to have strep  Throat and had Abx and course of steroid with some improvement -MRI with and without contrast was performed of brain and c-spine and were without enhancing lesions. Question whether may be progressing to secondary progressive phase given age and worsening with lack of new enhancing lesions.\"   Precautions/Limitations fall precautions   General Observations OT; pt is cooperative and engaging but did state she was  \"lazy:.    Cognitive Status Examination   Orientation orientation to person, place and time   Level of Consciousness alert   Able to Follow Commands success, 1-step commands   Sensory Examination   Sensory Comments OT: per pt, numbness and tingling in B hands but left side is the worst side.    Pain Assessment   Patient Currently in Pain Yes, see Vital Sign flowsheet   Integumentary/Edema   Integumentary/Edema no deficits were identifed   Posture   Posture forward head position;protracted shoulders   Range of Motion (ROM)   ROM Comment OT; BUE WFL   Strength   Strength Comments OT: BUE 4/5 for shoulder flexion and abduction   Coordination   Upper Extremity Coordination No deficits were identified   Gross Motor Coordination OT: no deficits noted   Fine Motor Coordination OT; able to manipulate a shirt, RW   Upper Body Dressing   Physical Assist/Nonphysical Assist: Dress Upper Body set-up required   Lower Body Dressing   Physical " Assist/Nonphysical Assist: Dress Lower Body 1 person assist   Instrumental Activities of Daily Living (IADL)   Previous Responsibilities meal prep;medication management  (making blankets, crafts)   Activities of Daily Living Analysis   Impairments Contributing to Impaired Activities of Daily Living balance impaired;coordination impaired;strength decreased;sensation decreased;pain;flexibility decreased   General Therapy Interventions   Planned Therapy Interventions ADL retraining;IADL retraining;balance training;motor coordination training;neuromuscular re-education;progressive activity/exercise   Clinical Impression   Criteria for Skilled Therapeutic Interventions Met yes, treatment indicated   OT Diagnosis OT: gernalized weaknss, impaired adls/iadls   Influenced by the following impairments OT: Pt currently demonstrate decreased ability to complete adls, iadls, impaired mobility, endurance, standing tolerance, impaired balance. Skilled OT needed to address above mentioned deficits and to decrease caregiver burden in order for a safe return home with  and assistance as needed from .   Assessment of Occupational Performance 3-5 Performance Deficits   Identified Performance Deficits OT: impaired bathing, dressing, social skills, home management.    Clinical Decision Making (Complexity) Moderate complexity   Therapy Frequency daily   Predicted Duration of Therapy Intervention (days/wks) 2- 3 weeks   Anticipated Equipment Needs at Discharge other (see comments)  (TBD)   Anticipated Discharge Disposition Home;Home with Assist;Home with Outpatient Therapy   Risks and Benefits of Treatment have been explained. Yes   Patient, Family & other staff in agreement with plan of care Yes   Clinical Impression Comments OT: Pt currently demonstrate decreased ability to complete adls, iadls, impaired mobility, endurance, standing tolerance, impaired balance. Skilled OT needed to address above mentioned deficits and to  decrease caregiver burden in order for a safe return home with  and assistance as needed from .   Therapy Certification   Start of Care Date 11/08/17   Certification date from 11/08/17   Certification date to 12/08/17   Medical Diagnosis MS exacerbation     Certification I certify the need for these services furnished under this plan of treatment and while under my care.  (Physician co-signature of this document indicates review and certification of the therapy plan).   Total Evaluation Time   Total Evaluation Time (Minutes) 30

## 2017-11-08 NOTE — PLAN OF CARE
Problem: Goal Outcome Summary  Goal: Goal Outcome Summary  Outcome: No Change  Patient constipated,miralax given plus senna,medium BM.Patient is incontinent of bladder.Denies pain.

## 2017-11-09 PROCEDURE — 25000132 ZZH RX MED GY IP 250 OP 250 PS 637: Performed by: INTERNAL MEDICINE

## 2017-11-09 PROCEDURE — 12000022 ZZH R&B SNF

## 2017-11-09 PROCEDURE — 40000193 ZZH STATISTIC PT WARD VISIT: Performed by: PHYSICAL THERAPIST

## 2017-11-09 PROCEDURE — 40000133 ZZH STATISTIC OT WARD VISIT: Performed by: OCCUPATIONAL THERAPIST

## 2017-11-09 PROCEDURE — 97535 SELF CARE MNGMENT TRAINING: CPT | Mod: GO | Performed by: OCCUPATIONAL THERAPIST

## 2017-11-09 PROCEDURE — 97530 THERAPEUTIC ACTIVITIES: CPT | Mod: GP | Performed by: PHYSICAL THERAPIST

## 2017-11-09 PROCEDURE — 97110 THERAPEUTIC EXERCISES: CPT | Mod: GP | Performed by: PHYSICAL THERAPIST

## 2017-11-09 RX ADMIN — OMEPRAZOLE 40 MG: 20 CAPSULE, DELAYED RELEASE ORAL at 05:49

## 2017-11-09 RX ADMIN — ACETAMINOPHEN 650 MG: 325 TABLET, FILM COATED ORAL at 07:35

## 2017-11-09 RX ADMIN — OXYBUTYNIN CHLORIDE 10 MG: 10 TABLET, FILM COATED, EXTENDED RELEASE ORAL at 20:26

## 2017-11-09 RX ADMIN — POLYETHYLENE GLYCOL 3350 17 G: 17 POWDER, FOR SOLUTION ORAL at 07:37

## 2017-11-09 RX ADMIN — BACLOFEN 20 MG: 20 TABLET ORAL at 20:27

## 2017-11-09 RX ADMIN — SENNOSIDES AND DOCUSATE SODIUM 2 TABLET: 8.6; 5 TABLET ORAL at 07:37

## 2017-11-09 RX ADMIN — OXYBUTYNIN CHLORIDE 10 MG: 10 TABLET, FILM COATED, EXTENDED RELEASE ORAL at 07:36

## 2017-11-09 RX ADMIN — MULTIPLE VITAMINS W/ MINERALS TAB 1 TABLET: TAB at 07:36

## 2017-11-09 RX ADMIN — BACLOFEN 20 MG: 20 TABLET ORAL at 07:37

## 2017-11-09 RX ADMIN — CALCIUM CARBONATE (ANTACID) CHEW TAB 500 MG 500 MG: 500 CHEW TAB at 15:45

## 2017-11-09 RX ADMIN — CHOLECALCIFEROL CAP 125 MCG (5000 UNIT) 5000 UNITS: 125 CAP at 07:36

## 2017-11-09 RX ADMIN — LORAZEPAM 1 MG: 0.5 TABLET ORAL at 20:26

## 2017-11-09 RX ADMIN — AMOXICILLIN 500 MG: 500 CAPSULE ORAL at 20:26

## 2017-11-09 RX ADMIN — HYDROCHLOROTHIAZIDE 12.5 MG: 12.5 TABLET ORAL at 07:36

## 2017-11-09 RX ADMIN — AMOXICILLIN 500 MG: 500 CAPSULE ORAL at 07:36

## 2017-11-09 RX ADMIN — ACETAMINOPHEN 650 MG: 325 TABLET, FILM COATED ORAL at 15:45

## 2017-11-09 RX ADMIN — GABAPENTIN 600 MG: 300 CAPSULE ORAL at 07:36

## 2017-11-09 RX ADMIN — GABAPENTIN 900 MG: 300 CAPSULE ORAL at 20:26

## 2017-11-09 RX ADMIN — ACETAMINOPHEN 650 MG: 325 TABLET, FILM COATED ORAL at 20:26

## 2017-11-09 RX ADMIN — TERIFLUNOMIDE 14 MG: 14 TABLET, FILM COATED ORAL at 07:35

## 2017-11-09 RX ADMIN — SERTRALINE HYDROCHLORIDE 150 MG: 100 TABLET ORAL at 20:27

## 2017-11-09 NOTE — PLAN OF CARE
Problem: Goal Outcome Summary  Goal: Goal Outcome Summary  Pt alert and oriented x4. Stayed in bed this shift. Denies any shortness of breath, wears 2 L O2 at bedtime. Incontinent of bladder x1. Had BM on day shift. Complained of increased muscle spasms and heartburn, got PRN order for baclofen and tums. PRN tylenol given x2 for headache. Pt denies any new concerns.

## 2017-11-09 NOTE — PLAN OF CARE
Problem: Goal Outcome Summary  Goal: Goal Outcome Summary  Pt A/O X 4. VSS. 2L NC overnight. Sleep: patient appeared to be sleeping and resting comfortably between repositioning and incontinence cares. Physical assessment at baseline. Denies nausea, shortness of breath, and chest pain. Reports headache pain as resolved. Skin intact. Incontinent of urine, checked every 2 hours, incontinence cares provided. Is on a regular diet. Bowels- audible/active in all four quadrants, is passing gas, last BM 11/9, patient had small amount of incontinent stool in pad when repositioned, normally is continent. Pt up with sliding board to wheel chair with assist of 1-2. Frequent repositioning encouraged, patient is able to use side-rails to assist with rolling from side to side. No PIV. Pt is able to make needs known and the call light is within the pt's reach. Continue to monitor.

## 2017-11-09 NOTE — PLAN OF CARE
Problem: Goal Outcome Summary  Goal: Goal Outcome Summary  Outcome: Improving  Pt stood pushing on parallel bars; min/mod A; relies heavily on arms to stay standing. Cont per POC

## 2017-11-09 NOTE — PLAN OF CARE
Problem: Goal Outcome Summary  Goal: Goal Outcome Summary  OT - Reviewed POC and OT goals with patient and adjusted care plan accordingly.     -30 mins due to patient using bedpan and needing additional time. Will continue POC 11/9/17 as patient is able to tolerate.

## 2017-11-10 PROCEDURE — 40000193 ZZH STATISTIC PT WARD VISIT

## 2017-11-10 PROCEDURE — 97116 GAIT TRAINING THERAPY: CPT | Mod: GP | Performed by: PHYSICAL THERAPIST

## 2017-11-10 PROCEDURE — 40000193 ZZH STATISTIC PT WARD VISIT: Performed by: PHYSICAL THERAPIST

## 2017-11-10 PROCEDURE — 97110 THERAPEUTIC EXERCISES: CPT | Mod: GP

## 2017-11-10 PROCEDURE — 12000022 ZZH R&B SNF

## 2017-11-10 PROCEDURE — 97110 THERAPEUTIC EXERCISES: CPT | Mod: GP | Performed by: PHYSICAL THERAPIST

## 2017-11-10 PROCEDURE — 25000132 ZZH RX MED GY IP 250 OP 250 PS 637: Performed by: INTERNAL MEDICINE

## 2017-11-10 RX ADMIN — HYDROCHLOROTHIAZIDE 12.5 MG: 12.5 TABLET ORAL at 07:52

## 2017-11-10 RX ADMIN — TERIFLUNOMIDE 14 MG: 14 TABLET, FILM COATED ORAL at 07:54

## 2017-11-10 RX ADMIN — CALCIUM CARBONATE (ANTACID) CHEW TAB 500 MG 500 MG: 500 CHEW TAB at 13:52

## 2017-11-10 RX ADMIN — SERTRALINE HYDROCHLORIDE 150 MG: 100 TABLET ORAL at 20:28

## 2017-11-10 RX ADMIN — OXYBUTYNIN CHLORIDE 10 MG: 10 TABLET, FILM COATED, EXTENDED RELEASE ORAL at 20:28

## 2017-11-10 RX ADMIN — AMOXICILLIN 500 MG: 500 CAPSULE ORAL at 07:52

## 2017-11-10 RX ADMIN — BACLOFEN 20 MG: 10 TABLET ORAL at 01:14

## 2017-11-10 RX ADMIN — ACETAMINOPHEN 650 MG: 325 TABLET, FILM COATED ORAL at 06:33

## 2017-11-10 RX ADMIN — BACLOFEN 20 MG: 20 TABLET ORAL at 07:53

## 2017-11-10 RX ADMIN — GABAPENTIN 600 MG: 300 CAPSULE ORAL at 07:51

## 2017-11-10 RX ADMIN — SENNOSIDES AND DOCUSATE SODIUM 2 TABLET: 8.6; 5 TABLET ORAL at 20:28

## 2017-11-10 RX ADMIN — ACETAMINOPHEN 650 MG: 325 TABLET, FILM COATED ORAL at 20:34

## 2017-11-10 RX ADMIN — MULTIPLE VITAMINS W/ MINERALS TAB 1 TABLET: TAB at 07:52

## 2017-11-10 RX ADMIN — BACLOFEN 20 MG: 20 TABLET ORAL at 20:28

## 2017-11-10 RX ADMIN — CHOLECALCIFEROL CAP 125 MCG (5000 UNIT) 5000 UNITS: 125 CAP at 07:52

## 2017-11-10 RX ADMIN — OMEPRAZOLE 40 MG: 20 CAPSULE, DELAYED RELEASE ORAL at 06:34

## 2017-11-10 RX ADMIN — GABAPENTIN 900 MG: 300 CAPSULE ORAL at 20:28

## 2017-11-10 RX ADMIN — ACETAMINOPHEN 650 MG: 325 TABLET, FILM COATED ORAL at 13:52

## 2017-11-10 RX ADMIN — OXYBUTYNIN CHLORIDE 10 MG: 10 TABLET, FILM COATED, EXTENDED RELEASE ORAL at 07:52

## 2017-11-10 RX ADMIN — AMOXICILLIN 500 MG: 500 CAPSULE ORAL at 20:29

## 2017-11-10 NOTE — PLAN OF CARE
Problem: Goal Outcome Summary  Goal: Goal Outcome Summary  Outcome: No Change  Patient sleeping between cares, she woke up with complained of spasm in bilateral lower legs and requested for prn baclofen, 20 mg given, patient reported relief and hour after taking medication and was able to fall back asleep. She was turned and repositioned every 2 hrs and change when incontinent of bladder, patient incontinent of extra large loose BM this morning, requested to sit on bedpan to continue with BM, had another medium loose Bm. Patient ordered breakfast, she has 0800 therapy and wants to eat before her therapy. Prn tylenol 650 mg given at 0630 for onset of headache, call light is with in reach, continue current plan of care

## 2017-11-10 NOTE — PLAN OF CARE
Problem: Goal Outcome Summary  Goal: Goal Outcome Summary  Pt alert and oriented x4. Remained in bed this shift. Turned and repositioned as tolerated. Wears 2 L O2 via nasal cannula at HS. Incontinent of urine x1, had large formed BM on bedpan at start of shift. Skin intact. Heels elevated on pillows. PRN tylenol given x2 for headache. BP 89/47 at start of shift, 95/50 on recheck. Encouraged fluids. Pt denies any new concerns.

## 2017-11-10 NOTE — PLAN OF CARE
Problem: Goal Outcome Summary  Goal: Goal Outcome Summary  Outcome: Therapy, progress toward functional goals as expected  PTA: Pt fatigued from long appt, did not return until mid-afternoon. Agreeable to supine PROM stretching for B LEs.

## 2017-11-10 NOTE — PLAN OF CARE
Problem: Goal Outcome Summary  Goal: Goal Outcome Summary  Outcome: No Change  Pt is alert and able to communicate needs.  at bedside and helps with cares. Off unit for most of shift for eye doctor appointment. Back on unit and has orders for eye drops 4x per day. PRN tylenol and Tums given at 1400. C/O some pain in lower left abdomen and stated she was having loose stools last night into this morning but then did not have any loose stools on this shift. Continue to monitor.

## 2017-11-10 NOTE — UTILIZATION REVIEW
Pain Interview  Admission    1. Have you had pain or hurting at any time in the last 5 days?  Yes  2. How much of the time have you experienced pain or hurting over the last 5 days? Almost Constantly  3. Over the past 5 days, has pain made it hard for you to sleep at night?  No  4. Over the past 5 days, have you limited your day-to-day activities because of pain?No  5. Rate pain intensity: Numeric 8       Where is your pain? In my head  How would you describe it? aches  What makes it better? tylenol  What makes it worse? Using my eyes, like reading

## 2017-11-11 PROCEDURE — 12000022 ZZH R&B SNF

## 2017-11-11 PROCEDURE — 40000193 ZZH STATISTIC PT WARD VISIT: Performed by: PHYSICAL THERAPIST

## 2017-11-11 PROCEDURE — 40000133 ZZH STATISTIC OT WARD VISIT: Performed by: OCCUPATIONAL THERAPIST

## 2017-11-11 PROCEDURE — 25000132 ZZH RX MED GY IP 250 OP 250 PS 637: Performed by: INTERNAL MEDICINE

## 2017-11-11 PROCEDURE — 97535 SELF CARE MNGMENT TRAINING: CPT | Mod: GO | Performed by: OCCUPATIONAL THERAPIST

## 2017-11-11 PROCEDURE — 97530 THERAPEUTIC ACTIVITIES: CPT | Mod: GP | Performed by: PHYSICAL THERAPIST

## 2017-11-11 PROCEDURE — 97110 THERAPEUTIC EXERCISES: CPT | Mod: GP | Performed by: PHYSICAL THERAPIST

## 2017-11-11 RX ADMIN — OXYBUTYNIN CHLORIDE 10 MG: 10 TABLET, FILM COATED, EXTENDED RELEASE ORAL at 08:05

## 2017-11-11 RX ADMIN — MULTIPLE VITAMINS W/ MINERALS TAB 1 TABLET: TAB at 08:05

## 2017-11-11 RX ADMIN — AMOXICILLIN 500 MG: 500 CAPSULE ORAL at 08:04

## 2017-11-11 RX ADMIN — BACLOFEN 20 MG: 20 TABLET ORAL at 08:05

## 2017-11-11 RX ADMIN — GABAPENTIN 900 MG: 300 CAPSULE ORAL at 20:23

## 2017-11-11 RX ADMIN — GABAPENTIN 600 MG: 300 CAPSULE ORAL at 08:04

## 2017-11-11 RX ADMIN — BACLOFEN 20 MG: 10 TABLET ORAL at 17:23

## 2017-11-11 RX ADMIN — SERTRALINE HYDROCHLORIDE 150 MG: 100 TABLET ORAL at 20:23

## 2017-11-11 RX ADMIN — ACETAMINOPHEN 650 MG: 325 TABLET, FILM COATED ORAL at 14:25

## 2017-11-11 RX ADMIN — LORAZEPAM 1 MG: 0.5 TABLET ORAL at 21:01

## 2017-11-11 RX ADMIN — ACETAMINOPHEN 650 MG: 325 TABLET, FILM COATED ORAL at 21:01

## 2017-11-11 RX ADMIN — ACETAMINOPHEN 650 MG: 325 TABLET, FILM COATED ORAL at 00:15

## 2017-11-11 RX ADMIN — CHOLECALCIFEROL CAP 125 MCG (5000 UNIT) 5000 UNITS: 125 CAP at 08:05

## 2017-11-11 RX ADMIN — ACETAMINOPHEN 650 MG: 325 TABLET, FILM COATED ORAL at 06:10

## 2017-11-11 RX ADMIN — OMEPRAZOLE 40 MG: 20 CAPSULE, DELAYED RELEASE ORAL at 06:09

## 2017-11-11 RX ADMIN — BACLOFEN 20 MG: 20 TABLET ORAL at 20:24

## 2017-11-11 RX ADMIN — POLYETHYLENE GLYCOL 400 AND PROPYLENE GLYCOL 1 DROP: 4; 3 SOLUTION/ DROPS OPHTHALMIC at 14:25

## 2017-11-11 RX ADMIN — TERIFLUNOMIDE 14 MG: 14 TABLET, FILM COATED ORAL at 08:05

## 2017-11-11 RX ADMIN — OXYBUTYNIN CHLORIDE 10 MG: 10 TABLET, FILM COATED, EXTENDED RELEASE ORAL at 20:23

## 2017-11-11 RX ADMIN — AMOXICILLIN 500 MG: 500 CAPSULE ORAL at 20:23

## 2017-11-11 RX ADMIN — CALCIUM CARBONATE (ANTACID) CHEW TAB 500 MG 500 MG: 500 CHEW TAB at 14:25

## 2017-11-11 NOTE — PLAN OF CARE
Problem: Goal Outcome Summary  Goal: Goal Outcome Summary  Outcome: No Change  Patient sleeping between cares, complained of headache, prn Tylenol given x 2 this shift with some relief. Patient incontinent of bladder, no bowel movement this shift, she was assist of one with turn and reposition every 3 hrs. Patient awake at 0500, states she has early therapy and will like to get ready for the morning, staff setup and she was able to clean up and ordered her breakfast. Patient has call light within reach, alert and able to make needs known.

## 2017-11-11 NOTE — PLAN OF CARE
Problem: Goal Outcome Summary  Goal: Goal Outcome Summary  Pt had good participation therapy session today.  Pt progressing well with SB transfers needing only SBA and set-up to complete.  Pt was able to get better LLE activation with standing in // bars.  Pt will continue to progress standing with fww in preparation for SPT training.

## 2017-11-11 NOTE — PLAN OF CARE
Problem: Goal Outcome Summary  Goal: Goal Outcome Summary  OT CX PM oT tX patient off campus for medical appointment OT unable to R/S will proceed with POC as patient tolerates 11/11/17

## 2017-11-11 NOTE — PLAN OF CARE
Problem: Goal Outcome Summary  Goal: Goal Outcome Summary  Outcome: No Change  Patient alert and oriented x 4. Able to make needs known. Turned and repositioned Q 2 hours. Incontinent of bladder. Appetite good. No BM this shift. Compression stockings on LEs. Denies pain and SOB. Continue to monitor.

## 2017-11-11 NOTE — PLAN OF CARE
Problem: Goal Outcome Summary  Goal: Goal Outcome Summary  Outcome: No Change  Pt alert and able to communicate needs. Continues to be incontinent of bladder and needs assistance being changed. Participated well in therapies. Took PRN Tums, Tylenol and eye drops in afternoon. No new concerns at this time. Continue to monitor.

## 2017-11-12 PROCEDURE — 40000193 ZZH STATISTIC PT WARD VISIT

## 2017-11-12 PROCEDURE — 40000193 ZZH STATISTIC PT WARD VISIT: Performed by: PHYSICAL THERAPIST

## 2017-11-12 PROCEDURE — 97530 THERAPEUTIC ACTIVITIES: CPT | Mod: GP | Performed by: PHYSICAL THERAPIST

## 2017-11-12 PROCEDURE — 40000133 ZZH STATISTIC OT WARD VISIT: Performed by: OCCUPATIONAL THERAPIST

## 2017-11-12 PROCEDURE — 97535 SELF CARE MNGMENT TRAINING: CPT | Mod: GO | Performed by: OCCUPATIONAL THERAPIST

## 2017-11-12 PROCEDURE — 97110 THERAPEUTIC EXERCISES: CPT | Mod: GP | Performed by: PHYSICAL THERAPIST

## 2017-11-12 PROCEDURE — 97530 THERAPEUTIC ACTIVITIES: CPT | Mod: GP

## 2017-11-12 PROCEDURE — 25000132 ZZH RX MED GY IP 250 OP 250 PS 637: Performed by: INTERNAL MEDICINE

## 2017-11-12 PROCEDURE — 97112 NEUROMUSCULAR REEDUCATION: CPT | Mod: GP

## 2017-11-12 PROCEDURE — 12000022 ZZH R&B SNF

## 2017-11-12 RX ADMIN — POTASSIUM CHLORIDE 40 MEQ: 750 TABLET, FILM COATED, EXTENDED RELEASE ORAL at 07:56

## 2017-11-12 RX ADMIN — POLYETHYLENE GLYCOL 400 AND PROPYLENE GLYCOL 1 DROP: 4; 3 SOLUTION/ DROPS OPHTHALMIC at 07:59

## 2017-11-12 RX ADMIN — SERTRALINE HYDROCHLORIDE 150 MG: 100 TABLET ORAL at 20:49

## 2017-11-12 RX ADMIN — MULTIPLE VITAMINS W/ MINERALS TAB 1 TABLET: TAB at 07:56

## 2017-11-12 RX ADMIN — HYDROCHLOROTHIAZIDE 12.5 MG: 12.5 TABLET ORAL at 07:58

## 2017-11-12 RX ADMIN — BACLOFEN 20 MG: 20 TABLET ORAL at 07:57

## 2017-11-12 RX ADMIN — BACLOFEN 20 MG: 20 TABLET ORAL at 20:50

## 2017-11-12 RX ADMIN — AMOXICILLIN 500 MG: 500 CAPSULE ORAL at 07:56

## 2017-11-12 RX ADMIN — LORAZEPAM 1 MG: 0.5 TABLET ORAL at 20:53

## 2017-11-12 RX ADMIN — POLYETHYLENE GLYCOL 400 AND PROPYLENE GLYCOL 1 DROP: 4; 3 SOLUTION/ DROPS OPHTHALMIC at 14:40

## 2017-11-12 RX ADMIN — TERIFLUNOMIDE 14 MG: 14 TABLET, FILM COATED ORAL at 07:57

## 2017-11-12 RX ADMIN — POLYETHYLENE GLYCOL 400 AND PROPYLENE GLYCOL 1 DROP: 4; 3 SOLUTION/ DROPS OPHTHALMIC at 20:49

## 2017-11-12 RX ADMIN — GABAPENTIN 600 MG: 300 CAPSULE ORAL at 07:56

## 2017-11-12 RX ADMIN — GABAPENTIN 900 MG: 300 CAPSULE ORAL at 20:50

## 2017-11-12 RX ADMIN — BACLOFEN 20 MG: 10 TABLET ORAL at 10:54

## 2017-11-12 RX ADMIN — CHOLECALCIFEROL CAP 125 MCG (5000 UNIT) 5000 UNITS: 125 CAP at 07:56

## 2017-11-12 RX ADMIN — OMEPRAZOLE 40 MG: 20 CAPSULE, DELAYED RELEASE ORAL at 06:56

## 2017-11-12 RX ADMIN — OXYBUTYNIN CHLORIDE 10 MG: 10 TABLET, FILM COATED, EXTENDED RELEASE ORAL at 20:49

## 2017-11-12 RX ADMIN — ACETAMINOPHEN 650 MG: 325 TABLET, FILM COATED ORAL at 07:57

## 2017-11-12 RX ADMIN — AMOXICILLIN 500 MG: 500 CAPSULE ORAL at 20:50

## 2017-11-12 RX ADMIN — ACETAMINOPHEN 650 MG: 325 TABLET, FILM COATED ORAL at 16:27

## 2017-11-12 RX ADMIN — OXYBUTYNIN CHLORIDE 10 MG: 10 TABLET, FILM COATED, EXTENDED RELEASE ORAL at 07:56

## 2017-11-12 NOTE — PROGRESS NOTES
Marylee is progressing well in PT. She tolerated increased standing time from EOM to standing and sliding board transfers are supervision level assist. Continue per POC.

## 2017-11-12 NOTE — PLAN OF CARE
Problem: Goal Outcome Summary  Goal: Goal Outcome Summary  Outcome: No Change  Pt is alert and able to communicate needs. Incontinent of bladder. Had one bowel movement on bedpan. Denies SOB. Took PRN tylenol in the AM for headache with relief. Pt stated that she thinks her blood pressure has been lower than normal. Last taken on shift it was 105/49. Sticky note left for Dr to address concern.

## 2017-11-12 NOTE — PROGRESS NOTES
SPIRITUAL HEALTH SERVICES  SPIRITUAL ASSESSMENT Progress Note  [facility] TCU     REFERRAL SOURCE: Initial Visit    Marylee said she was about to go outside for a little while, then had PT/OT, but she would welcome a visit later today or another day.  She inquired about  internship/CPE as she shared that her  is a CPE Resident with a different [facility].    PLAN: Check back with Marylee at 3:45pm today    Eric Trevino   Intern  Pager [phone]

## 2017-11-12 NOTE — PLAN OF CARE
Problem: Goal Outcome Summary  Goal: Goal Outcome Summary  OT: assessment completed. No tx indicated. Patient reports mod independence with chair to toilet and toileting and was able to demonstrate for OT assessment. Patient reports inability to wear clothing other than gown due to cast. Patient mod independent for grooming hygiene, oral cares and hair wash. Patient needing assist with bed mobility and L foot brace due to cast. Patient mod independent with sponge bathing of what she can around the cast. OT in agreement patient doing what she can and will need assist with other until cast is removed or modified. no OT needs identified. Patient getting enough UE strengthening from current mobility status with the wt of her body in the cast. OT will DC at this time unless patient's current status changes. Patient in agreement

## 2017-11-12 NOTE — PLAN OF CARE
Problem: Goal Outcome Summary  Goal: Goal Outcome Summary  Patient A&O, lungs sound clear, Denied CP, numbness, and SOB, repositioned and turned in bed, PRN Tylenol, baclofen and ativan administered, demonstrates the ability to use call light appropriately, will continue to monitor patient as POC.

## 2017-11-12 NOTE — PLAN OF CARE
Problem: Goal Outcome Summary  Goal: Goal Outcome Summary  Outcome: No Change  Patient sleeping between cares, staff assist with turning and repositioning every 3 hrs, patient incontinent of bladder, no BM noted this shift, continue to have headache, patient was asked if she will use her C-pap, she declined and said she has only been using Oxygen via nasal cannula at night, denies SOB, no sign of respiratory distress noted while patient sleeps, call light is with in reach, continue current plan of care.

## 2017-11-13 LAB — GLUCOSE BLDC GLUCOMTR-MCNC: 121 MG/DL (ref 70–99)

## 2017-11-13 PROCEDURE — 00220000 ZZH SNF RUG CODE OPNP

## 2017-11-13 PROCEDURE — 97535 SELF CARE MNGMENT TRAINING: CPT | Mod: GO | Performed by: OCCUPATIONAL THERAPIST

## 2017-11-13 PROCEDURE — 40000193 ZZH STATISTIC PT WARD VISIT

## 2017-11-13 PROCEDURE — 25000132 ZZH RX MED GY IP 250 OP 250 PS 637: Performed by: INTERNAL MEDICINE

## 2017-11-13 PROCEDURE — 12000022 ZZH R&B SNF

## 2017-11-13 PROCEDURE — 00000146 ZZHCL STATISTIC GLUCOSE BY METER IP

## 2017-11-13 PROCEDURE — 97110 THERAPEUTIC EXERCISES: CPT | Mod: GP

## 2017-11-13 PROCEDURE — 97530 THERAPEUTIC ACTIVITIES: CPT | Mod: GP

## 2017-11-13 PROCEDURE — 40000133 ZZH STATISTIC OT WARD VISIT: Performed by: OCCUPATIONAL THERAPIST

## 2017-11-13 RX ADMIN — AMOXICILLIN 500 MG: 500 CAPSULE ORAL at 20:30

## 2017-11-13 RX ADMIN — GABAPENTIN 900 MG: 300 CAPSULE ORAL at 20:30

## 2017-11-13 RX ADMIN — GABAPENTIN 600 MG: 300 CAPSULE ORAL at 08:37

## 2017-11-13 RX ADMIN — POLYETHYLENE GLYCOL 400 AND PROPYLENE GLYCOL 1 DROP: 4; 3 SOLUTION/ DROPS OPHTHALMIC at 08:38

## 2017-11-13 RX ADMIN — TERIFLUNOMIDE 14 MG: 14 TABLET, FILM COATED ORAL at 08:37

## 2017-11-13 RX ADMIN — CHOLECALCIFEROL CAP 125 MCG (5000 UNIT) 5000 UNITS: 125 CAP at 08:36

## 2017-11-13 RX ADMIN — ACETAMINOPHEN 650 MG: 325 TABLET, FILM COATED ORAL at 14:16

## 2017-11-13 RX ADMIN — BACLOFEN 20 MG: 20 TABLET ORAL at 20:30

## 2017-11-13 RX ADMIN — OXYBUTYNIN CHLORIDE 10 MG: 10 TABLET, FILM COATED, EXTENDED RELEASE ORAL at 20:30

## 2017-11-13 RX ADMIN — SENNOSIDES AND DOCUSATE SODIUM 2 TABLET: 8.6; 5 TABLET ORAL at 20:30

## 2017-11-13 RX ADMIN — BACLOFEN 20 MG: 20 TABLET ORAL at 08:36

## 2017-11-13 RX ADMIN — OMEPRAZOLE 40 MG: 20 CAPSULE, DELAYED RELEASE ORAL at 07:38

## 2017-11-13 RX ADMIN — MULTIPLE VITAMINS W/ MINERALS TAB 1 TABLET: TAB at 08:37

## 2017-11-13 RX ADMIN — BACLOFEN 20 MG: 10 TABLET ORAL at 22:28

## 2017-11-13 RX ADMIN — POLYETHYLENE GLYCOL 400 AND PROPYLENE GLYCOL 1 DROP: 4; 3 SOLUTION/ DROPS OPHTHALMIC at 20:31

## 2017-11-13 RX ADMIN — POLYETHYLENE GLYCOL 400 AND PROPYLENE GLYCOL 1 DROP: 4; 3 SOLUTION/ DROPS OPHTHALMIC at 15:00

## 2017-11-13 RX ADMIN — SERTRALINE HYDROCHLORIDE 150 MG: 100 TABLET ORAL at 20:30

## 2017-11-13 RX ADMIN — OXYBUTYNIN CHLORIDE 10 MG: 10 TABLET, FILM COATED, EXTENDED RELEASE ORAL at 08:36

## 2017-11-13 RX ADMIN — AMOXICILLIN 500 MG: 500 CAPSULE ORAL at 08:36

## 2017-11-13 NOTE — PLAN OF CARE
Problem: Goal Outcome Summary  Goal: Goal Outcome Summary  Outcome: No Change  Patient sleeping between cares, change for incontinent episodes x 2 and turned and repositioned x 3 this shift, no BM noted. Patient denies pain and shortness of breath, using supplemental O2 instead of C-pap, has call light with in reach and able to make needs known.

## 2017-11-13 NOTE — PLAN OF CARE
Problem: Goal Outcome Summary  Goal: Goal Outcome Summary  Alert and verbally expresses her needs. Participating in therapies. Good appetite. Pleasant. Requested tylenol this afternoon for headache with relief.

## 2017-11-13 NOTE — PLAN OF CARE
Problem: Goal Outcome Summary  Goal: Goal Outcome Summary  Outcome: Therapy, progress toward functional goals as expected  OT - focus of therapy session on trialing LE dressing while supine in bed for energy conservation strategy and standing pivot transfer to BS with 2WW.     Pt tolerating therapy session well but reports increased fatigue needing frequent rest breaks.

## 2017-11-13 NOTE — PROGRESS NOTES
CLINICAL NUTRITION SERVICES - ASSESSMENT NOTE     Nutrition Prescription    Malnutrition Status:    Patient does not meet two of the criteria necessary for diagnosing malnutrition but is at risk for malnutrition.    Recommendations already ordered by Registered Dietitian (RD):  1. Scheduled Ensure Plus strawberry at 2 PM  2. Add one-time cherry Gelatein Plus to dinner meal tray   3. PRN supplement order    Future/Additional Recommendations:  1. Continue regular diet  2. Encourage protein at all meals and snacks  3. Provide general healthy eating education, menu planning, and easy (or no-cook) meals prior to discharge      REASON FOR ASSESSMENT  Marylee Woods is a 62-year-old female assessed by the dietitian for [facility].       NUTRITION HISTORY  - Information obtained from EMR and patient in room. Pt has previously seen dietitians who have provided high-protein, high-calorie information. However, pt asked for clarification of what foods provide protein.  - Pt reports concern with gaining weight and not wanting to get fat. She notes that the swelling in her legs has been getting worse and that her left side is her weak side.  - Pt reports growing up and relying on process foods. She does not know how to cook but would like ideas prior to discharge on crockpot, microwave and no-cook meals.   - She eats a light breakfast because she doesn't want to have a bowel movement during physical therapy in the morning.   - She sips on water and soda throughout the day but also likes strawberry Ensure.       CURRENT NUTRITION ORDERS  Diet: Regular  Intake: Per nursing documentation and [employer], patient generally orders meals TID. Most meals 100%. Skipped supper 11/9 because she was not feeling well. Over the last 5 days, patient has averaged 1882 kcal and 43 g protein. She says loves grilled cheese and orders these often. Reports liking the meat loaf.   Tolerance: Per nursing documentation, good feeding tolerance. Pt reports no  "n/v.     LABS  Labs reviewed    MEDICATIONS  Medications reviewed    ANTHROPOMETRICS  Height: 5'8\" (172.7 cm)  Most Recent Weight: 63.2 kg (139 lb 4.8 oz)    IBW: 63.6 kg  BMI: 21.18 - Normal BMI  Weight History:  Wt Readings from Last 10 Encounters:   11/06/17 63.2 kg (139 lb 4.8 oz)   11/05/17 64.5 kg (142 lb 3.2 oz)   08/31/17 58.5 kg (129 lb)   08/04/17 58.3 kg (128 lb 8 oz)   01/24/17 54.4 kg (120 lb)   12/28/16 54.4 kg (120 lb)   12/12/16 54.4 kg (120 lb)   11/01/16 55.8 kg (123 lb)   07/08/16 55.8 kg (123 lb)   06/16/16 56.2 kg (123 lb 12.8 oz)     Pt has gained 7.4 kg (~16 lb) over the past year. May be related to fluid, 3+ edema documented in both ankles and feet on 11/9 and in H&P.    Dosing Weight: 63 kg (admit weight)    ASSESSED NUTRITION NEEDS  Estimated Energy Needs: 2337-2233 kcals/day (25-30 kcals/kg)  Justification: Increased needs with acute on chronic illness  Estimated Protein Needs: 63-76 g protein/day (1.2-1.5 g pro/kg)  Justification: Preservation of lean body mass  Estimated Fluid Needs: 7417-3364 mL/day (1 mL/kcal)   Justification: Maintenance    PHYSICAL FINDINGS  Poor dentition: wears a full set of dentures but reports no difficulty with chewing.   Skin is dry and somewhat flaky, particularly on legs.    See malnutrition section below for further findings.    MALNUTRITION  % Intake: No decreased intake noted  % Weight Loss: None noted  Subcutaneous Fat Loss: None observed  Muscle Loss: Pt is generally thin with minimal muscle and poor definition of posterior calf, quadriceps. However, unclear whether this represents muscle loss or is related to age and her minimal activity with MS.    Fluid Accumulation/Edema: 3+ edema (moderate), may be related to low protein intake   Malnutrition Diagnosis: Patient does not meet two of the above criteria necessary for diagnosing malnutrition but is at risk for malnutrition.    NUTRITION DIAGNOSIS  Predicted inadequate protein intake related to patient " food preferences and knowledge deficit regarding protein-containing foods as evidenced by patient average intake of 43 g protein over the last 5 days.    INTERVENTIONS  Implementation  Nutrition Education: Provided education on importance of protein for maintence of lean body mass, bone health, healing. Provided examples of dietary protein sources.   Collaboration with other providers  Medical food supplement therapy     Goals  Patient to continue to consume % of nutritionally adequate meal trays TID or the equivalent with supplements/snacks.     Monitoring/Evaluation  Progress toward goals will be monitored and evaluated per protocol.      Susan Herrera  Dietetic Intern

## 2017-11-13 NOTE — PLAN OF CARE
Problem: Goal Outcome Summary  Goal: Goal Outcome Summary  OT: patient very tired in PM. Will use caution with scheduling to allow rest between therapies.

## 2017-11-13 NOTE — PLAN OF CARE
Problem: Goal Outcome Summary  Goal: Goal Outcome Summary  Patient A&O x4, lungs sound clear, active bowel sound, Denied CP and SOB. CMS intact. Tylenol given once during the shift for headache, repositioned and turned in bed, heels elevated off bed, demonstrates the ability to use call light appropriately, will continue to monitor patient as POC.

## 2017-11-14 PROCEDURE — 97110 THERAPEUTIC EXERCISES: CPT | Mod: GP

## 2017-11-14 PROCEDURE — 40000193 ZZH STATISTIC PT WARD VISIT

## 2017-11-14 PROCEDURE — 97530 THERAPEUTIC ACTIVITIES: CPT | Mod: GP

## 2017-11-14 PROCEDURE — 25000132 ZZH RX MED GY IP 250 OP 250 PS 637: Performed by: INTERNAL MEDICINE

## 2017-11-14 PROCEDURE — 40000133 ZZH STATISTIC OT WARD VISIT: Performed by: OCCUPATIONAL THERAPIST

## 2017-11-14 PROCEDURE — 97535 SELF CARE MNGMENT TRAINING: CPT | Mod: GO | Performed by: OCCUPATIONAL THERAPIST

## 2017-11-14 PROCEDURE — 99308 SBSQ NF CARE LOW MDM 20: CPT | Performed by: INTERNAL MEDICINE

## 2017-11-14 PROCEDURE — 12000022 ZZH R&B SNF

## 2017-11-14 RX ORDER — NITROFURANTOIN MACROCRYSTALS 50 MG/1
100 CAPSULE ORAL DAILY
Status: DISCONTINUED | OUTPATIENT
Start: 2017-11-14 | End: 2017-11-25 | Stop reason: HOSPADM

## 2017-11-14 RX ADMIN — CHOLECALCIFEROL CAP 125 MCG (5000 UNIT) 5000 UNITS: 125 CAP at 07:52

## 2017-11-14 RX ADMIN — POLYETHYLENE GLYCOL 400 AND PROPYLENE GLYCOL 1 DROP: 4; 3 SOLUTION/ DROPS OPHTHALMIC at 14:15

## 2017-11-14 RX ADMIN — GABAPENTIN 600 MG: 300 CAPSULE ORAL at 07:52

## 2017-11-14 RX ADMIN — OXYBUTYNIN CHLORIDE 10 MG: 10 TABLET, FILM COATED, EXTENDED RELEASE ORAL at 20:46

## 2017-11-14 RX ADMIN — GABAPENTIN 900 MG: 300 CAPSULE ORAL at 20:46

## 2017-11-14 RX ADMIN — BACLOFEN 20 MG: 20 TABLET ORAL at 20:46

## 2017-11-14 RX ADMIN — SERTRALINE HYDROCHLORIDE 150 MG: 100 TABLET ORAL at 20:45

## 2017-11-14 RX ADMIN — ACETAMINOPHEN 650 MG: 325 TABLET, FILM COATED ORAL at 14:16

## 2017-11-14 RX ADMIN — LORAZEPAM 1 MG: 0.5 TABLET ORAL at 21:11

## 2017-11-14 RX ADMIN — BACLOFEN 20 MG: 20 TABLET ORAL at 07:59

## 2017-11-14 RX ADMIN — LORAZEPAM 1 MG: 0.5 TABLET ORAL at 04:05

## 2017-11-14 RX ADMIN — POLYETHYLENE GLYCOL 400 AND PROPYLENE GLYCOL 1 DROP: 4; 3 SOLUTION/ DROPS OPHTHALMIC at 20:47

## 2017-11-14 RX ADMIN — MULTIPLE VITAMINS W/ MINERALS TAB 1 TABLET: TAB at 07:52

## 2017-11-14 RX ADMIN — OMEPRAZOLE 40 MG: 20 CAPSULE, DELAYED RELEASE ORAL at 07:58

## 2017-11-14 RX ADMIN — POLYETHYLENE GLYCOL 400 AND PROPYLENE GLYCOL 1 DROP: 4; 3 SOLUTION/ DROPS OPHTHALMIC at 07:52

## 2017-11-14 RX ADMIN — ACETAMINOPHEN 650 MG: 325 TABLET, FILM COATED ORAL at 08:04

## 2017-11-14 RX ADMIN — POLYETHYLENE GLYCOL 400 AND PROPYLENE GLYCOL 1 DROP: 4; 3 SOLUTION/ DROPS OPHTHALMIC at 04:05

## 2017-11-14 RX ADMIN — TERIFLUNOMIDE 14 MG: 14 TABLET, FILM COATED ORAL at 07:58

## 2017-11-14 RX ADMIN — OXYBUTYNIN CHLORIDE 10 MG: 10 TABLET, FILM COATED, EXTENDED RELEASE ORAL at 07:52

## 2017-11-14 RX ADMIN — NITROFURANTOIN (MACROCRYSTALS) 100 MG: 50 CAPSULE ORAL at 16:40

## 2017-11-14 RX ADMIN — SENNOSIDES AND DOCUSATE SODIUM 2 TABLET: 8.6; 5 TABLET ORAL at 20:47

## 2017-11-14 NOTE — PLAN OF CARE
Problem: Goal Outcome Summary  Goal: Goal Outcome Summary  Outcome: Therapy, progress toward functional goals as expected  OT - focus of therapy session on standing pivot transfer training, toilet transfer to BSC, toilet hygiene, LE dressing, and shortened grooming and hygiene routine.     Pt pleasant and very talkative during session. Pt at max assist for standing pivot transfers will continue to practice for increased safety needed for safe home d/c.

## 2017-11-14 NOTE — PLAN OF CARE
Problem: Goal Outcome Summary  Goal: Goal Outcome Summary  Alert and oriented and verbally expresses her needs.Very pleasant. Took tylenol this morning for headache with relief. States she feels that she has a sinus infection due to the headache, and pain in right side of face and some bleeding from her gums on right side. Dr. Zurita notified of concern. Participates in therapies. Continues with some urinary incontinence. Therapies are working with her to enable her to use commode.

## 2017-11-14 NOTE — PLAN OF CARE
Problem: Goal Outcome Summary  Goal: Goal Outcome Summary  Outcome: No Change  Pt is alert and able to communicate needs. Pt was in bed most of shift watching movies with call light in reach. Incontinent of bladder x2 and last bowel movement was yesterday. Needs assistance with changing brief d/t MS. Denies pain and SOB. Took PRN baclofen at 2230 for muscle spasms. No new concerns at this time.

## 2017-11-14 NOTE — PROGRESS NOTES
11/13/17 1400   General Information   Patient Profile Review See Profile for full history and prior level of function   Daily Contact with Relatives or Friends Phone call;Visit   Pets Cat   Community Involvement   Community Involvement Disabled   Drives No   Spiritual Practice Zoroastrianism   Sees [facility] ? Yes   Hobbies/Interests   Word Puzzles Word Search   Craft/Art (Likes crafts/coloring)   Media   TV / Movies TV;Movie list   Impression   Open to Socializing with Others Independent   Barriers to Leisure Endurance;Mobility   Treatment Plan   Structured Groups Craft groups;Game groups;Social performances   Type of Intervention Independent with activity;1:1 intervention;Structured groups   One to One Therapeutic Intervention Hand massage;;Volunteer   Assessment Pt. will be invited to groups of interest and offered hand massage during stay on unit. Pt. is social and reports likes being with people.  Pt. will also have 1:1 visit from recreatinoal Services and/or volunteer during stay.

## 2017-11-14 NOTE — PROGRESS NOTES
Progress Note        Woods, Marylee [[id number]] (F) - 62 year old          Assessment and Plan:      MS exacerbation; MRI with and without contrast was performed of brain and c-spine and were without enhancing lesions. Question whether may be progressing to secondary progressive phase given age and worsening with lack of new enhancing lesions. She was treated for acute exacerbation with 5 days of IV solumedrol with mild improvement  - ct Aubagio 14mg qd, baclofefn 20 mg bid, D3   - f/u with neurology in 1-2 months  - Ct Gabapentin      # GERD- ct Omeprazole      # Pancytopenia- apparently worked up as o/p by hem-onc and thought sec to medicaitons for MS or nitrofurantoin  - stable. Monitor     # UTI ppx- on Nitrofurantoin at home. Up on admission  Cr Cl <60. Gave  2 L of PO fluids and repeat Cr cl was  More than 60   Resume nitrofurantoin 100 mg daily       # Strep Pharyngitis- non gp A strep  - Complete Amoxicillin 500 mg bid x 10 days (finished on 11/13/17)      # HTN and Lymphedema-continue on  HCTZ  # Anxiety- Ativan prn , Zoloft      # DVT ppx- mechanical for now as pancytopenia (Had mechanical in acute hospital)      Subjective: worried that she had little gum bleeding while brushing   No fever or chills   tolerating diet     Ros:  4-point ROS negative except in subjective/interval history.       Objective         Physical Exam:  /51 (BP Location: Left arm)  Pulse 73  Temp 98.1  F (36.7  C) (Oral)  Resp 16  Wt 63.2 kg (139 lb 4.8 oz)  LMP  (LMP Unknown)  SpO2 98%  BMI 21.18 kg/m2  General: No distress, cooperative, pleasant  Pulmonary: CTAB, normal respiratory effort. No wheezes, rales or rhonchi   Cardiovascular: RRR. S1 and S2 preset. No m/g/r.  Abdomen:BS+, soft, non-tender, non-distended.   Extremities: Warm and well perfused. No  edema.  Neuro: Alert and oriented x 3. Moves all extremities symmetrically.             Carlos Zurita  Hospitalist   [facility]

## 2017-11-14 NOTE — PLAN OF CARE
Problem: Goal Outcome Summary  Goal: Goal Outcome Summary  Outcome: No Change  Pt.A&Ox4. Assist of 1 with bed mobility. Requested and rec'd Ativan 1mg po @ 0405 - stated had been awake since 0100 and would like to get a few hours of sleep before therapy. No c/o's pain/discomfort. No CP/SOB. Using O2 @ 2L via NC. Incont.of bladder - no BM this shift.

## 2017-11-15 PROCEDURE — 97530 THERAPEUTIC ACTIVITIES: CPT | Mod: GO | Performed by: OCCUPATIONAL THERAPIST

## 2017-11-15 PROCEDURE — 97535 SELF CARE MNGMENT TRAINING: CPT | Mod: GO | Performed by: OCCUPATIONAL THERAPIST

## 2017-11-15 PROCEDURE — 25000132 ZZH RX MED GY IP 250 OP 250 PS 637: Performed by: INTERNAL MEDICINE

## 2017-11-15 PROCEDURE — 40000133 ZZH STATISTIC OT WARD VISIT: Performed by: OCCUPATIONAL THERAPIST

## 2017-11-15 PROCEDURE — 40000193 ZZH STATISTIC PT WARD VISIT: Performed by: PHYSICAL THERAPIST

## 2017-11-15 PROCEDURE — 97530 THERAPEUTIC ACTIVITIES: CPT | Mod: GP | Performed by: PHYSICAL THERAPIST

## 2017-11-15 PROCEDURE — 12000022 ZZH R&B SNF

## 2017-11-15 RX ADMIN — GABAPENTIN 600 MG: 300 CAPSULE ORAL at 07:56

## 2017-11-15 RX ADMIN — POLYETHYLENE GLYCOL 400 AND PROPYLENE GLYCOL 1 DROP: 4; 3 SOLUTION/ DROPS OPHTHALMIC at 13:14

## 2017-11-15 RX ADMIN — MULTIPLE VITAMINS W/ MINERALS TAB 1 TABLET: TAB at 07:56

## 2017-11-15 RX ADMIN — POLYETHYLENE GLYCOL 400 AND PROPYLENE GLYCOL 1 DROP: 4; 3 SOLUTION/ DROPS OPHTHALMIC at 21:43

## 2017-11-15 RX ADMIN — OXYBUTYNIN CHLORIDE 10 MG: 10 TABLET, FILM COATED, EXTENDED RELEASE ORAL at 21:34

## 2017-11-15 RX ADMIN — SERTRALINE HYDROCHLORIDE 150 MG: 100 TABLET ORAL at 21:34

## 2017-11-15 RX ADMIN — LORAZEPAM 1 MG: 0.5 TABLET ORAL at 21:34

## 2017-11-15 RX ADMIN — OXYBUTYNIN CHLORIDE 10 MG: 10 TABLET, FILM COATED, EXTENDED RELEASE ORAL at 07:57

## 2017-11-15 RX ADMIN — TERIFLUNOMIDE 14 MG: 14 TABLET, FILM COATED ORAL at 07:57

## 2017-11-15 RX ADMIN — ACETAMINOPHEN 650 MG: 325 TABLET, FILM COATED ORAL at 06:29

## 2017-11-15 RX ADMIN — CHOLECALCIFEROL CAP 125 MCG (5000 UNIT) 5000 UNITS: 125 CAP at 07:56

## 2017-11-15 RX ADMIN — POLYETHYLENE GLYCOL 400 AND PROPYLENE GLYCOL 1 DROP: 4; 3 SOLUTION/ DROPS OPHTHALMIC at 07:56

## 2017-11-15 RX ADMIN — OMEPRAZOLE 40 MG: 20 CAPSULE, DELAYED RELEASE ORAL at 06:29

## 2017-11-15 RX ADMIN — BACLOFEN 20 MG: 10 TABLET ORAL at 12:42

## 2017-11-15 RX ADMIN — NITROFURANTOIN (MACROCRYSTALS) 100 MG: 50 CAPSULE ORAL at 07:56

## 2017-11-15 RX ADMIN — GABAPENTIN 900 MG: 300 CAPSULE ORAL at 21:35

## 2017-11-15 RX ADMIN — BACLOFEN 20 MG: 20 TABLET ORAL at 21:36

## 2017-11-15 RX ADMIN — POLYETHYLENE GLYCOL 400 AND PROPYLENE GLYCOL 1 DROP: 4; 3 SOLUTION/ DROPS OPHTHALMIC at 01:11

## 2017-11-15 RX ADMIN — CALCIUM CARBONATE (ANTACID) CHEW TAB 500 MG 500 MG: 500 CHEW TAB at 21:34

## 2017-11-15 RX ADMIN — BACLOFEN 20 MG: 20 TABLET ORAL at 07:56

## 2017-11-15 NOTE — PLAN OF CARE
"Problem: Goal Outcome Summary  Goal: Goal Outcome Summary  Outcome: Therapy, progress toward functional goals as expected  OT - focus of therapy session on MoCA cognitive assessment and standing pivot transfer from WC to bed. Patient scored 27/30 on MoCA indicating \"normal\" cognition.      "

## 2017-11-15 NOTE — PLAN OF CARE
Problem: Goal Outcome Summary  Goal: Goal Outcome Summary  Outcome: No Change  Pt.sleeping between cares. Turned/repositioned with assist of 1. Incontinent of bladder x1. PCDs applied BLEs. No c/o's CP/SOB. Denies pain or discomfort. Pt.going on 4hr.pass today with .

## 2017-11-15 NOTE — PLAN OF CARE
Problem: Goal Outcome Summary  Goal: Goal Outcome Summary  CGA/SBA scoot/slide transfer w/c<>Easy Stand. Tolerated 12 min on Easy Stand but c/o L patella pain. Stand pivot with FWW mod A. Cont per POC

## 2017-11-15 NOTE — PLAN OF CARE
Problem: Goal Outcome Summary  Goal: Goal Outcome Summary  Outcome: No Change  Pt is able to make needs known, pleasant and cooperative with cares. Had a large BM after breakfast. Buttocks remain reddened, barrier cream applied. Bilateral L/E with Pneumatic Compression Device when in bed to promote adequate circulation. Medicated with Baclofen at 1242 for complaints of spasms. Pt is going on PHOEBE for 4 hours with her  at 1500 today.

## 2017-11-15 NOTE — PLAN OF CARE
Problem: Goal Outcome Summary  Goal: Goal Outcome Summary  Outcome: No Change  Alert and oriented x 4, denied having pain and discomfort, good appetite and fluid intake, done turn and reposition q2 hr, incontinent of bladder, gave ativan at bed per pharmacists, blanchable redness noted on coccyx area.

## 2017-11-15 NOTE — PLAN OF CARE
Problem: Goal Outcome Summary  Goal: Goal Outcome Summary  OT-Pt is making progress towards goals.

## 2017-11-16 PROCEDURE — 12000022 ZZH R&B SNF

## 2017-11-16 PROCEDURE — 97110 THERAPEUTIC EXERCISES: CPT | Mod: GP

## 2017-11-16 PROCEDURE — 25000132 ZZH RX MED GY IP 250 OP 250 PS 637: Performed by: INTERNAL MEDICINE

## 2017-11-16 PROCEDURE — 40000133 ZZH STATISTIC OT WARD VISIT: Performed by: OCCUPATIONAL THERAPIST

## 2017-11-16 PROCEDURE — 97535 SELF CARE MNGMENT TRAINING: CPT | Mod: GO | Performed by: OCCUPATIONAL THERAPIST

## 2017-11-16 PROCEDURE — 40000193 ZZH STATISTIC PT WARD VISIT

## 2017-11-16 PROCEDURE — 97530 THERAPEUTIC ACTIVITIES: CPT | Mod: GP | Performed by: PHYSICAL THERAPIST

## 2017-11-16 PROCEDURE — 97110 THERAPEUTIC EXERCISES: CPT | Mod: GP | Performed by: PHYSICAL THERAPIST

## 2017-11-16 PROCEDURE — 40000193 ZZH STATISTIC PT WARD VISIT: Performed by: PHYSICAL THERAPIST

## 2017-11-16 RX ADMIN — LORAZEPAM 1 MG: 0.5 TABLET ORAL at 21:07

## 2017-11-16 RX ADMIN — TERIFLUNOMIDE 14 MG: 14 TABLET, FILM COATED ORAL at 07:54

## 2017-11-16 RX ADMIN — SENNOSIDES AND DOCUSATE SODIUM 2 TABLET: 8.6; 5 TABLET ORAL at 21:17

## 2017-11-16 RX ADMIN — CALCIUM CARBONATE (ANTACID) CHEW TAB 500 MG 500 MG: 500 CHEW TAB at 21:16

## 2017-11-16 RX ADMIN — MULTIPLE VITAMINS W/ MINERALS TAB 1 TABLET: TAB at 07:51

## 2017-11-16 RX ADMIN — NITROFURANTOIN (MACROCRYSTALS) 100 MG: 50 CAPSULE ORAL at 07:53

## 2017-11-16 RX ADMIN — CHOLECALCIFEROL CAP 125 MCG (5000 UNIT) 5000 UNITS: 125 CAP at 07:53

## 2017-11-16 RX ADMIN — HYDROCHLOROTHIAZIDE 12.5 MG: 12.5 TABLET ORAL at 07:53

## 2017-11-16 RX ADMIN — GABAPENTIN 900 MG: 300 CAPSULE ORAL at 21:16

## 2017-11-16 RX ADMIN — POLYETHYLENE GLYCOL 400 AND PROPYLENE GLYCOL 1 DROP: 4; 3 SOLUTION/ DROPS OPHTHALMIC at 12:39

## 2017-11-16 RX ADMIN — GABAPENTIN 600 MG: 300 CAPSULE ORAL at 07:52

## 2017-11-16 RX ADMIN — BACLOFEN 20 MG: 10 TABLET ORAL at 02:50

## 2017-11-16 RX ADMIN — POLYETHYLENE GLYCOL 400 AND PROPYLENE GLYCOL 1 DROP: 4; 3 SOLUTION/ DROPS OPHTHALMIC at 21:20

## 2017-11-16 RX ADMIN — OMEPRAZOLE 40 MG: 20 CAPSULE, DELAYED RELEASE ORAL at 05:25

## 2017-11-16 RX ADMIN — OXYBUTYNIN CHLORIDE 10 MG: 10 TABLET, FILM COATED, EXTENDED RELEASE ORAL at 07:53

## 2017-11-16 RX ADMIN — OXYBUTYNIN CHLORIDE 10 MG: 10 TABLET, FILM COATED, EXTENDED RELEASE ORAL at 21:17

## 2017-11-16 RX ADMIN — POLYETHYLENE GLYCOL 400 AND PROPYLENE GLYCOL 1 DROP: 4; 3 SOLUTION/ DROPS OPHTHALMIC at 02:47

## 2017-11-16 RX ADMIN — ACETAMINOPHEN 650 MG: 325 TABLET, FILM COATED ORAL at 12:38

## 2017-11-16 RX ADMIN — POLYETHYLENE GLYCOL 400 AND PROPYLENE GLYCOL 1 DROP: 4; 3 SOLUTION/ DROPS OPHTHALMIC at 07:50

## 2017-11-16 RX ADMIN — BACLOFEN 20 MG: 20 TABLET ORAL at 21:17

## 2017-11-16 RX ADMIN — SERTRALINE HYDROCHLORIDE 150 MG: 100 TABLET ORAL at 21:06

## 2017-11-16 RX ADMIN — BACLOFEN 20 MG: 20 TABLET ORAL at 07:53

## 2017-11-16 NOTE — PLAN OF CARE
Patient requested for something else for GERD symptoms instead of TUMS. Left note for MD to address. Still pending further orders.

## 2017-11-16 NOTE — PLAN OF CARE
Problem: Goal Outcome Summary  Goal: Goal Outcome Summary  Outcome: Therapy, progress toward functional goals as expected  OT - focus of therapy session on LE dressing including undergarments, toileting hygiene while in bed, transfer training.    Pt fatiguing quickly today; attempted standing pivot transfer but not enough energy so opting for sliding board instead.

## 2017-11-16 NOTE — PLAN OF CARE
"Problem: Goal Outcome Summary  Goal: Goal Outcome Summary  Outcome: No Change  Pt alert and oriented. Forgetful. Pleasant and cooperative. Denies SOB. Having headache. Tylenol given. C/o heart burn. Declined Tums and said makes her nauseated. Charge nurse updated. Up in w/c with pivot transfer. Declined stool softer due to \"diarrhea\". Coccyx with blanchable redness. Barrier applied. Remains incontinent of stool. Appetite good. No nausea or vomit this shift. No new skin concern noted. Will anticipate needs.       "

## 2017-11-16 NOTE — PROGRESS NOTES
"CLINICAL NUTRITION SERVICES    Reason for Assessment:  Pt requested additional education on general healthy eating and strategies based on her limited mobility and cooking prior to d/c.    Diet History:  See nutrition assessment from 11/13 for detailed diet history.         Interventions:  Nutrition Education  1. Provided verbal and written nutrition education on general healthy eating and heart-healthy eating. Provided easy healthy recipes, crock-pot meals, no cook breakfasts.   2. Information provided: Crockpot meal recipies, choosing balanced frozen dinners, \"Food for Thought: MS and Nutrition\" booklet.     Goals:   Patient will verbalize at least three nutrition-related aspects of healthy eating.     Follow-up:    Patient to ask any further nutrition-related questions before discharge.  In addition, pt may request outpatient RD appointment.      Susan Herrera  Dietetic Intern  "

## 2017-11-16 NOTE — PLAN OF CARE
Problem: Goal Outcome Summary  Goal: Goal Outcome Summary  Outcome: No Change  Patient reported no new concerns overnight. Medicated with Baclofen x1 per prn orders. Patient asleep in between cares. Incontinent of bladder x2 tonight, assist of 1 with jana-cares. Complaint with turning and repositioning, declined turning x1 but otherwise agreeable with the plan. Call light in reach. Continue to monitor.

## 2017-11-16 NOTE — PLAN OF CARE
Problem: Goal Outcome Summary  Goal: Goal Outcome Summary  Outcome: No Change  Pt returned from [facility] at approximately 930 pm. No SOB, no pain. Some blanchable redness noted on her buttocks. Placed nursing shift order to reposition q two hours.

## 2017-11-17 PROCEDURE — 40000193 ZZH STATISTIC PT WARD VISIT: Performed by: PHYSICAL THERAPIST

## 2017-11-17 PROCEDURE — 97535 SELF CARE MNGMENT TRAINING: CPT | Mod: GO | Performed by: OCCUPATIONAL THERAPIST

## 2017-11-17 PROCEDURE — 25000132 ZZH RX MED GY IP 250 OP 250 PS 637: Performed by: INTERNAL MEDICINE

## 2017-11-17 PROCEDURE — 99307 SBSQ NF CARE SF MDM 10: CPT | Performed by: INTERNAL MEDICINE

## 2017-11-17 PROCEDURE — 12000022 ZZH R&B SNF

## 2017-11-17 PROCEDURE — 97530 THERAPEUTIC ACTIVITIES: CPT | Mod: GP | Performed by: PHYSICAL THERAPIST

## 2017-11-17 PROCEDURE — 40000133 ZZH STATISTIC OT WARD VISIT: Performed by: OCCUPATIONAL THERAPIST

## 2017-11-17 PROCEDURE — 97110 THERAPEUTIC EXERCISES: CPT | Mod: GP | Performed by: PHYSICAL THERAPIST

## 2017-11-17 RX ADMIN — SERTRALINE HYDROCHLORIDE 150 MG: 100 TABLET ORAL at 21:14

## 2017-11-17 RX ADMIN — BACLOFEN 20 MG: 20 TABLET ORAL at 09:25

## 2017-11-17 RX ADMIN — CHOLECALCIFEROL CAP 125 MCG (5000 UNIT) 5000 UNITS: 125 CAP at 09:24

## 2017-11-17 RX ADMIN — OMEPRAZOLE 40 MG: 20 CAPSULE, DELAYED RELEASE ORAL at 07:04

## 2017-11-17 RX ADMIN — SENNOSIDES AND DOCUSATE SODIUM 2 TABLET: 8.6; 5 TABLET ORAL at 21:14

## 2017-11-17 RX ADMIN — MULTIPLE VITAMINS W/ MINERALS TAB 1 TABLET: TAB at 09:26

## 2017-11-17 RX ADMIN — GABAPENTIN 900 MG: 300 CAPSULE ORAL at 21:13

## 2017-11-17 RX ADMIN — BACLOFEN 20 MG: 20 TABLET ORAL at 21:15

## 2017-11-17 RX ADMIN — NITROFURANTOIN (MACROCRYSTALS) 100 MG: 50 CAPSULE ORAL at 09:25

## 2017-11-17 RX ADMIN — POLYETHYLENE GLYCOL 400 AND PROPYLENE GLYCOL 1 DROP: 4; 3 SOLUTION/ DROPS OPHTHALMIC at 02:12

## 2017-11-17 RX ADMIN — LORAZEPAM 1 MG: 0.5 TABLET ORAL at 21:17

## 2017-11-17 RX ADMIN — TERIFLUNOMIDE 14 MG: 14 TABLET, FILM COATED ORAL at 09:27

## 2017-11-17 RX ADMIN — GABAPENTIN 600 MG: 300 CAPSULE ORAL at 09:25

## 2017-11-17 RX ADMIN — OXYBUTYNIN CHLORIDE 10 MG: 10 TABLET, FILM COATED, EXTENDED RELEASE ORAL at 09:24

## 2017-11-17 RX ADMIN — POLYETHYLENE GLYCOL 400 AND PROPYLENE GLYCOL 1 DROP: 4; 3 SOLUTION/ DROPS OPHTHALMIC at 09:26

## 2017-11-17 RX ADMIN — POLYETHYLENE GLYCOL 400 AND PROPYLENE GLYCOL 1 DROP: 4; 3 SOLUTION/ DROPS OPHTHALMIC at 21:18

## 2017-11-17 RX ADMIN — OXYBUTYNIN CHLORIDE 10 MG: 10 TABLET, FILM COATED, EXTENDED RELEASE ORAL at 21:15

## 2017-11-17 NOTE — PROGRESS NOTES
Progress Note        Woods, Marylee [[id number]] (F) - 62 year old          Assessment and Plan:      MS exacerbation; MRI with and without contrast was performed of brain and c-spine and were without enhancing lesions. Question whether may be progressing to secondary progressive phase given age and worsening with lack of new enhancing lesions. She was treated for acute exacerbation with 5 days of IV solumedrol with mild improvement  - ct Aubagio 14mg qd, baclofefn 20 mg bid, D3   - f/u with neurology in 1-2 months  - Ct Gabapentin      # GERD- ct Omeprazole and TUMs     # Pancytopenia- apparently worked up as o/p by hem-onc and thought sec to medicaitons for MS or nitrofurantoin  - stable. Monitor     # UTI ppx- on Nitrofurantoin at home. Up on admission  Cr Cl <60. Gave  2 L of PO fluids and repeat Cr cl was  More than 60   Resumed nitrofurantoin 100 mg daily       # Strep Pharyngitis- non gp A strep  - Complete Amoxicillin 500 mg bid x 10 days (finished on 11/13/17)      # HTN and Lymphedema-continue on  HCTZ  # Anxiety- Ativan prn , Zoloft   # sinus head ache: appears controlled with tylenol      # DVT ppx- mechanical for now as pancytopenia (Had mechanical in acute hospital)        Subjective:   No acute issues  Improving with therapy   Reports her hard ache is getting better     Ros:  4-point ROS negative except in subjective/interval history.       Objective         Physical Exam:  /54 (BP Location: Left arm)  Pulse 77  Temp 98  F (36.7  C) (Oral)  Resp 16  Wt 63.2 kg (139 lb 4.8 oz)  LMP  (LMP Unknown)  SpO2 95%  BMI 21.18 kg/m2    General: No distress, cooperative, pleasant  Pulmonary: CTAB, normal respiratory effort. No wheezes, rales or rhonchi   Cardiovascular: RRR. S1 and S2 preset. No m/g/r.  Abdomen:BS+, soft, non-tender, non-distended.   Extremities: Warm and well perfused. No  edema.  Neuro: Alert and oriented x 3. Moves all extremities symmetrically.        Carlos Zurita  Hospitalist    [facility]

## 2017-11-17 NOTE — PLAN OF CARE
Problem: Goal Outcome Summary  Goal: Goal Outcome Summary  Pt did well without board, seated scoot bed<>w/c, CGA. Pt's LE weaker in PM for pivot transfer. Cont per POC

## 2017-11-17 NOTE — PROGRESS NOTES
Care Conference has been scheduled for Monday November 20 th @ 2:00 pm. Provider and rehab scheduling aware.     SW will continue to follow and assist as needed.    STEPHANY Beltran  [facility]   P: [phone]  Pgr: [phone]

## 2017-11-17 NOTE — PLAN OF CARE
Problem: Goal Outcome Summary  Goal: Goal Outcome Summary  Outcome: No Change  Patient alert and oriented,up in her wheelchair.Patient participating in therapies.Denies pain.Pleasant,continue with POC.

## 2017-11-17 NOTE — PLAN OF CARE
Problem: Goal Outcome Summary  Goal: Goal Outcome Summary  Outcome: Therapy, progress toward functional goals as expected  OT - focus of therapy session on simple meal prep simulation, kitchen mobility and energy conservation strategies to use during simple meal prep and house tasks. Patient receptive to OT feedback.

## 2017-11-17 NOTE — PLAN OF CARE
Problem: Goal Outcome Summary  Goal: Goal Outcome Summary  Outcome: No Change  Alert and oriented x 4, pt was up on her w/c until 1800, pt took an nap until 1930, done turn and reposition, c/o nausea and heart burn gave tums and it was effective, voided x 2, good appetite and fluid intake.

## 2017-11-17 NOTE — UTILIZATION REVIEW
Pain Interview  14 day    1. Have you had pain or hurting at any time in the last 5 days?  Yes  2. How much of the time have you experienced pain or hurting over the last 5 days? Frequently  3. Over the past 5 days, has pain made it hard for you to sleep at night?  No  4. Over the past 5 days, have you limited your day-to-day activities because of pain?No  5. Rate pain intensity: Numeric      Verbal: Mild

## 2017-11-17 NOTE — PLAN OF CARE
Problem: Goal Outcome Summary  Goal: Goal Outcome Summary  Outcome: No Change  Alert and oriented x4. Reported no new complaints overnight. No complaints of pain during the night. Incontinent of bladder x1, total assist with jana-cares. Turned and repositioned q 2-3 hours. Call light in reach. Continue with POC.

## 2017-11-17 NOTE — PLAN OF CARE
Problem: Goal Outcome Summary  Goal: Goal Outcome Summary  Pt enjoyed Easy Stand--18 minutes; could feel stretching in LE; better position with neutral footplates. Scoot seated transfer today as states LE don't feel strong. Cont per POC

## 2017-11-18 PROCEDURE — 12000022 ZZH R&B SNF

## 2017-11-18 PROCEDURE — 25000132 ZZH RX MED GY IP 250 OP 250 PS 637: Performed by: INTERNAL MEDICINE

## 2017-11-18 PROCEDURE — 40000133 ZZH STATISTIC OT WARD VISIT: Performed by: OCCUPATIONAL THERAPIST

## 2017-11-18 PROCEDURE — 40000193 ZZH STATISTIC PT WARD VISIT: Performed by: PHYSICAL THERAPIST

## 2017-11-18 PROCEDURE — 97535 SELF CARE MNGMENT TRAINING: CPT | Mod: GO | Performed by: OCCUPATIONAL THERAPIST

## 2017-11-18 PROCEDURE — 97110 THERAPEUTIC EXERCISES: CPT | Mod: GP | Performed by: PHYSICAL THERAPIST

## 2017-11-18 PROCEDURE — 97530 THERAPEUTIC ACTIVITIES: CPT | Mod: GP | Performed by: PHYSICAL THERAPIST

## 2017-11-18 RX ADMIN — BACLOFEN 20 MG: 20 TABLET ORAL at 09:24

## 2017-11-18 RX ADMIN — OMEPRAZOLE 40 MG: 20 CAPSULE, DELAYED RELEASE ORAL at 06:26

## 2017-11-18 RX ADMIN — GABAPENTIN 900 MG: 300 CAPSULE ORAL at 20:44

## 2017-11-18 RX ADMIN — POLYETHYLENE GLYCOL 400 AND PROPYLENE GLYCOL 1 DROP: 4; 3 SOLUTION/ DROPS OPHTHALMIC at 14:00

## 2017-11-18 RX ADMIN — POLYETHYLENE GLYCOL 400 AND PROPYLENE GLYCOL 1 DROP: 4; 3 SOLUTION/ DROPS OPHTHALMIC at 09:27

## 2017-11-18 RX ADMIN — POLYETHYLENE GLYCOL 400 AND PROPYLENE GLYCOL 1 DROP: 4; 3 SOLUTION/ DROPS OPHTHALMIC at 01:03

## 2017-11-18 RX ADMIN — CHOLECALCIFEROL CAP 125 MCG (5000 UNIT) 5000 UNITS: 125 CAP at 09:22

## 2017-11-18 RX ADMIN — MULTIPLE VITAMINS W/ MINERALS TAB 1 TABLET: TAB at 09:23

## 2017-11-18 RX ADMIN — OXYBUTYNIN CHLORIDE 10 MG: 10 TABLET, FILM COATED, EXTENDED RELEASE ORAL at 20:46

## 2017-11-18 RX ADMIN — BACLOFEN 20 MG: 10 TABLET ORAL at 15:54

## 2017-11-18 RX ADMIN — LORAZEPAM 1 MG: 0.5 TABLET ORAL at 20:43

## 2017-11-18 RX ADMIN — SERTRALINE HYDROCHLORIDE 150 MG: 100 TABLET ORAL at 20:46

## 2017-11-18 RX ADMIN — POLYETHYLENE GLYCOL 400 AND PROPYLENE GLYCOL 1 DROP: 4; 3 SOLUTION/ DROPS OPHTHALMIC at 20:47

## 2017-11-18 RX ADMIN — GABAPENTIN 600 MG: 300 CAPSULE ORAL at 09:23

## 2017-11-18 RX ADMIN — NITROFURANTOIN (MACROCRYSTALS) 100 MG: 50 CAPSULE ORAL at 09:22

## 2017-11-18 RX ADMIN — BACLOFEN 20 MG: 20 TABLET ORAL at 20:45

## 2017-11-18 RX ADMIN — TERIFLUNOMIDE 14 MG: 14 TABLET, FILM COATED ORAL at 09:26

## 2017-11-18 RX ADMIN — SENNOSIDES AND DOCUSATE SODIUM 2 TABLET: 8.6; 5 TABLET ORAL at 20:46

## 2017-11-18 RX ADMIN — OXYBUTYNIN CHLORIDE 10 MG: 10 TABLET, FILM COATED, EXTENDED RELEASE ORAL at 09:23

## 2017-11-18 NOTE — PLAN OF CARE
Problem: Goal Outcome Summary  Goal: Goal Outcome Summary  Outcome: No Change  Pt.appears to be sleeping well between cares. Turned/repositioned q 2-3 hrs with assist of 1. Denies pain or discomfort. No c/o's CP/SOB. PCDs intact BLEs.

## 2017-11-18 NOTE — PLAN OF CARE
Problem: Goal Outcome Summary  Goal: Goal Outcome Summary  OT: patient CC set for Monday. Patient able to demonstrate greater activity tolerance today with 2 sit to stand from bed height adjusted for greater ease and min assist grossly 1 min duration and bed to WC transfer x2 with set up and modified squat/slide x2. Patient not at baseline but pleased with her greater ability today

## 2017-11-18 NOTE — PLAN OF CARE
Problem: Goal Outcome Summary  Goal: Goal Outcome Summary  Discussed last day of PT 11/21; care conference on Monday. OT working on stand pivots for toilet as pt's w/c won't fit. Pt informed PT she has a drop arm commode at home could use. Pt eager to get home. Doing well with seated scoot transfers--SBA; pt able to move footrests/armrest, brakes. Progressing per POC

## 2017-11-18 NOTE — PLAN OF CARE
Problem: Goal Outcome Summary  Goal: Goal Outcome Summary  Outcome: No Change  Alert and oriented x 4, denied having pain and discomfort, good appetite and fluid intake, pt was up on her w/c, for dinner and went outside to smoke x 1, blanchable redness noted on coccyx area done turn and reposition.

## 2017-11-18 NOTE — PLAN OF CARE
Problem: Goal Outcome Summary  Goal: Goal Outcome Summary  Outcome: Improving  Patient has no complains,eating and voiding okay,no BM this shift,declined her senna.Participating in therapies,steady on her feet with gait belt and walker.

## 2017-11-19 PROCEDURE — 25000132 ZZH RX MED GY IP 250 OP 250 PS 637: Performed by: INTERNAL MEDICINE

## 2017-11-19 PROCEDURE — 40000133 ZZH STATISTIC OT WARD VISIT: Performed by: OCCUPATIONAL THERAPIST

## 2017-11-19 PROCEDURE — 97110 THERAPEUTIC EXERCISES: CPT | Mod: GP | Performed by: PHYSICAL THERAPIST

## 2017-11-19 PROCEDURE — 40000193 ZZH STATISTIC PT WARD VISIT: Performed by: PHYSICAL THERAPIST

## 2017-11-19 PROCEDURE — 97112 NEUROMUSCULAR REEDUCATION: CPT | Mod: GP | Performed by: PHYSICAL THERAPIST

## 2017-11-19 PROCEDURE — 97530 THERAPEUTIC ACTIVITIES: CPT | Mod: GP | Performed by: PHYSICAL THERAPIST

## 2017-11-19 PROCEDURE — 00220000 ZZH SNF RUG CODE OPNP

## 2017-11-19 PROCEDURE — 12000022 ZZH R&B SNF

## 2017-11-19 PROCEDURE — 97535 SELF CARE MNGMENT TRAINING: CPT | Mod: GO | Performed by: OCCUPATIONAL THERAPIST

## 2017-11-19 RX ADMIN — CHOLECALCIFEROL CAP 125 MCG (5000 UNIT) 5000 UNITS: 125 CAP at 08:59

## 2017-11-19 RX ADMIN — GABAPENTIN 600 MG: 300 CAPSULE ORAL at 08:59

## 2017-11-19 RX ADMIN — LORAZEPAM 1 MG: 0.5 TABLET ORAL at 20:33

## 2017-11-19 RX ADMIN — SENNOSIDES AND DOCUSATE SODIUM 2 TABLET: 8.6; 5 TABLET ORAL at 20:35

## 2017-11-19 RX ADMIN — TERIFLUNOMIDE 14 MG: 14 TABLET, FILM COATED ORAL at 09:02

## 2017-11-19 RX ADMIN — OXYBUTYNIN CHLORIDE 10 MG: 10 TABLET, FILM COATED, EXTENDED RELEASE ORAL at 08:59

## 2017-11-19 RX ADMIN — GABAPENTIN 900 MG: 300 CAPSULE ORAL at 20:35

## 2017-11-19 RX ADMIN — BACLOFEN 20 MG: 20 TABLET ORAL at 08:59

## 2017-11-19 RX ADMIN — OMEPRAZOLE 40 MG: 20 CAPSULE, DELAYED RELEASE ORAL at 06:43

## 2017-11-19 RX ADMIN — BACLOFEN 20 MG: 10 TABLET ORAL at 18:08

## 2017-11-19 RX ADMIN — NITROFURANTOIN (MACROCRYSTALS) 100 MG: 50 CAPSULE ORAL at 09:00

## 2017-11-19 RX ADMIN — SERTRALINE HYDROCHLORIDE 150 MG: 100 TABLET ORAL at 20:35

## 2017-11-19 RX ADMIN — ACETAMINOPHEN 650 MG: 325 TABLET, FILM COATED ORAL at 06:43

## 2017-11-19 RX ADMIN — POLYETHYLENE GLYCOL 400 AND PROPYLENE GLYCOL 1 DROP: 4; 3 SOLUTION/ DROPS OPHTHALMIC at 20:37

## 2017-11-19 RX ADMIN — MULTIPLE VITAMINS W/ MINERALS TAB 1 TABLET: TAB at 09:00

## 2017-11-19 RX ADMIN — BACLOFEN 20 MG: 20 TABLET ORAL at 20:36

## 2017-11-19 RX ADMIN — OXYBUTYNIN CHLORIDE 10 MG: 10 TABLET, FILM COATED, EXTENDED RELEASE ORAL at 20:36

## 2017-11-19 RX ADMIN — POLYETHYLENE GLYCOL 400 AND PROPYLENE GLYCOL 1 DROP: 4; 3 SOLUTION/ DROPS OPHTHALMIC at 09:00

## 2017-11-19 NOTE — PLAN OF CARE
Problem: Goal Outcome Summary  Goal: Goal Outcome Summary  Outcome: No Change  No new issues or concerns. Sleeping well. Tuned/repositoned with assist of 1. Incontinent of urine x1. Denies pain or discomfort. No c/o's CP/SOB. PCDs intact BLEs.

## 2017-11-19 NOTE — PLAN OF CARE
Problem: Goal Outcome Summary  Goal: Goal Outcome Summary  RN: Makes needs known, denies pain.  Up in wheel chair independently without problems, out to smoke cigarettes independently, no problems noted.     Hydrochlorothiazide held this morning, no adverse sx with lower BP. Provider updated, continue to monitor.

## 2017-11-19 NOTE — PLAN OF CARE
Problem: Goal Outcome Summary  Goal: Goal Outcome Summary  Outcome: Therapy, progress toward functional goals as expected  PT: Pt ind at w/c level. Completed all aspects for lateral w/c to mat transfer safely today. Severe left LE mm and core weakness persists. Pt very motivated.

## 2017-11-20 PROCEDURE — 99366 TEAM CONF W/PAT BY HC PROF: CPT

## 2017-11-20 PROCEDURE — 97110 THERAPEUTIC EXERCISES: CPT | Mod: GP

## 2017-11-20 PROCEDURE — 25000132 ZZH RX MED GY IP 250 OP 250 PS 637: Performed by: INTERNAL MEDICINE

## 2017-11-20 PROCEDURE — 99307 SBSQ NF CARE SF MDM 10: CPT | Performed by: INTERNAL MEDICINE

## 2017-11-20 PROCEDURE — 12000022 ZZH R&B SNF

## 2017-11-20 PROCEDURE — 97535 SELF CARE MNGMENT TRAINING: CPT | Mod: GO | Performed by: OCCUPATIONAL THERAPIST

## 2017-11-20 PROCEDURE — 40000193 ZZH STATISTIC PT WARD VISIT

## 2017-11-20 PROCEDURE — 99366 TEAM CONF W/PAT BY HC PROF: CPT | Performed by: OCCUPATIONAL THERAPIST

## 2017-11-20 PROCEDURE — 40000133 ZZH STATISTIC OT WARD VISIT: Performed by: OCCUPATIONAL THERAPIST

## 2017-11-20 RX ADMIN — POLYETHYLENE GLYCOL 400 AND PROPYLENE GLYCOL 1 DROP: 4; 3 SOLUTION/ DROPS OPHTHALMIC at 20:12

## 2017-11-20 RX ADMIN — SERTRALINE HYDROCHLORIDE 150 MG: 100 TABLET ORAL at 20:12

## 2017-11-20 RX ADMIN — GABAPENTIN 900 MG: 300 CAPSULE ORAL at 20:12

## 2017-11-20 RX ADMIN — OMEPRAZOLE 40 MG: 20 CAPSULE, DELAYED RELEASE ORAL at 05:41

## 2017-11-20 RX ADMIN — NITROFURANTOIN (MACROCRYSTALS) 100 MG: 50 CAPSULE ORAL at 08:14

## 2017-11-20 RX ADMIN — BACLOFEN 20 MG: 20 TABLET ORAL at 20:12

## 2017-11-20 RX ADMIN — BACLOFEN 20 MG: 10 TABLET ORAL at 18:31

## 2017-11-20 RX ADMIN — HYDROCHLOROTHIAZIDE 12.5 MG: 12.5 TABLET ORAL at 08:14

## 2017-11-20 RX ADMIN — OXYBUTYNIN CHLORIDE 10 MG: 10 TABLET, FILM COATED, EXTENDED RELEASE ORAL at 08:14

## 2017-11-20 RX ADMIN — GABAPENTIN 600 MG: 300 CAPSULE ORAL at 08:13

## 2017-11-20 RX ADMIN — OXYBUTYNIN CHLORIDE 10 MG: 10 TABLET, FILM COATED, EXTENDED RELEASE ORAL at 20:12

## 2017-11-20 RX ADMIN — POLYETHYLENE GLYCOL 400 AND PROPYLENE GLYCOL 1 DROP: 4; 3 SOLUTION/ DROPS OPHTHALMIC at 15:15

## 2017-11-20 RX ADMIN — POLYETHYLENE GLYCOL 400 AND PROPYLENE GLYCOL 1 DROP: 4; 3 SOLUTION/ DROPS OPHTHALMIC at 08:17

## 2017-11-20 RX ADMIN — LORAZEPAM 1 MG: 0.5 TABLET ORAL at 20:17

## 2017-11-20 RX ADMIN — CHOLECALCIFEROL CAP 125 MCG (5000 UNIT) 5000 UNITS: 125 CAP at 08:14

## 2017-11-20 RX ADMIN — BACLOFEN 20 MG: 20 TABLET ORAL at 08:13

## 2017-11-20 RX ADMIN — MULTIPLE VITAMINS W/ MINERALS TAB 1 TABLET: TAB at 08:12

## 2017-11-20 RX ADMIN — TERIFLUNOMIDE 14 MG: 14 TABLET, FILM COATED ORAL at 08:18

## 2017-11-20 NOTE — PROGRESS NOTES
TCU Care Coordinator Progress Note    Admission date: 11/6/2017    Data: Marylee Woods is a 61 yo female on TCU for rehab following hospitalization for MS exacerbation.    Intervention: Met with patient and  Manuel as part of TCU Care Conference, introduced role of Care Coordinator, and held discussion of anticipated DC planning needs. Initiated referral with [facility] for home care services of PT/OT, per patient request.    Assessment: Patient will have above home care needs. She would like to begin home care the week of 12/4/2017 because she has all day outpatient infusions scheduled daily the week of 11/27/2017. She will be wheelchair based upon DC to home. She lives with supportive  who is able to assist as needed at home.    Plan: Will continue to follow DC planning needs throughout TCU stay. Likely DC 11/25/2017 if she meets TCU therapy goals and remains medically stable.    Shay Vazquez RN, BSN, Patient Care Management Coordinator  [facility]  [address], [address]  [address]  [email or web address]  [email or web address]   Desk: [phone] TCU Main [phone] Fax [phone] Pager [phone]

## 2017-11-20 NOTE — PLAN OF CARE
Problem: Goal Outcome Summary  Goal: Goal Outcome Summary  Outcome: No Change  Pt alert and able to communicate needs. Transfers by pivoting from chair to bed or bed to chair. Incontinent of bladder. Some redness noted to coccyx and barrier cream applied. No new concerns at this time.

## 2017-11-20 NOTE — PLAN OF CARE
Problem: Goal Outcome Summary  Goal: Goal Outcome Summary  Outcome: Therapy, progress toward functional goals as expected  OT - focus of therapy session on toileting IND using BSC and sliding board transfer from simulated home bed set up.    Patient progressing well with OT goals.    Care Conference  Patient, patient's , PT, OT, SW and care coordinator present for conference. Therapies set to end 11/24/17 so that patient can go home 11/25/17. Recommending home therapies.

## 2017-11-20 NOTE — PLAN OF CARE
Problem: Goal Outcome Summary  Goal: Goal Outcome Summary  Outcome: Therapy, progress toward functional goals as expected  PTA: Attended medical team care conference with pt and pt's  present for 30 min. Update given on current progress towards PT goals. Recommend pt remain at w/c level once d/c'd home until pt can safely manage sit<>Stands, stand pivot transfers, and amb using ww (progress with home PT). Pt is able to demo safe scoot pivot and sliding board transfers at this time. Pt will also be sent home with self stretching and care giver stretching handouts along with strengthening HEP so as to continue to progress these activities as well. Recommend home PT at time of d/c. Pt has all necessary equipment at this time.

## 2017-11-20 NOTE — PLAN OF CARE
Problem: Goal Outcome Summary  Goal: Goal Outcome Summary  Outcome: No Change  Alert and oriented x 4, c/o muscle spasms gave baclofen around 1800, pt was up on her w/c until 1800, good appetite and fluid intake, voided x 2, done turn and reposition, blanchable redness noted on coccyx area.

## 2017-11-20 NOTE — PROGRESS NOTES
Progress Note        Woods, Marylee [[id number]] (F) - 62 year old          Assessment and Plan:      MS exacerbation; MRI with and without contrast was performed of brain and c-spine and were without enhancing lesions. Question whether may be progressing to secondary progressive phase given age and worsening with lack of new enhancing lesions. She was treated for acute exacerbation with 5 days of IV solumedrol with mild improvement  - ct Aubagio 14mg qd, baclofefn 20 mg bid, D3   - f/u with neurology in 1-2 months  - Ct Gabapentin      # GERD- ct Omeprazole and TUMs     # Pancytopenia- apparently worked up as o/p by hem-onc and thought sec to medicaitons for MS or nitrofurantoin  - stable. Monitor     # UTI ppx- on Nitrofurantoin at home.   Resumed nitrofurantoin 100 mg daily  After completion of antibiotics      # Strep Pharyngitis- non gp A strep  - Complete Amoxicillin 500 mg bid x 10 days (finished on 11/13/17)      # HTN and Lymphedema-continue on  HCTZ  # Anxiety- Ativan prn , Zoloft   # sinus head ache: appears controlled with tylenol      # DVT ppx- mechanical for now as pancytopenia (Had mechanical in acute hospital)        Subjective:   No acute issues  Sitting in wheel chair  No fever or chills       Ros:  4-point ROS negative except in subjective/interval history.       Objective         Physical Exam:  /52 (BP Location: Left arm)  Pulse 81  Temp 97.7  F (36.5  C) (Oral)  Resp 18  Wt 63.2 kg (139 lb 4.8 oz)  LMP  (LMP Unknown)  SpO2 96%  BMI 21.18 kg/m2      Pulmonary: CTAB, normal respiratory effort.  Cardiovascular: RRR. S1 and S2 preset. No m/g/r.  Abdomen:BS+, soft, non-tender  Extremities: Warm and well perfused.   Neuro: Alert and oriented x 3.        Carlos Zurita  Hospitalist   [facility]

## 2017-11-20 NOTE — PLAN OF CARE
Problem: Goal Outcome Summary  Goal: Goal Outcome Summary  Outcome: No Change  RN: Pt sleeping this shift with no new concerns or complaints made. Pt is able to make needs known. Con't to monitor red blanchable coccyx area. Con't POC.

## 2017-11-21 ENCOUNTER — CARE COORDINATION (OUTPATIENT)
Dept: CARE COORDINATION | Facility: CLINIC | Age: 62
End: 2017-11-21

## 2017-11-21 PROCEDURE — 40000133 ZZH STATISTIC OT WARD VISIT: Performed by: OCCUPATIONAL THERAPIST

## 2017-11-21 PROCEDURE — 12000022 ZZH R&B SNF

## 2017-11-21 PROCEDURE — 25000132 ZZH RX MED GY IP 250 OP 250 PS 637: Performed by: INTERNAL MEDICINE

## 2017-11-21 PROCEDURE — 97535 SELF CARE MNGMENT TRAINING: CPT | Mod: GO | Performed by: OCCUPATIONAL THERAPIST

## 2017-11-21 PROCEDURE — 97530 THERAPEUTIC ACTIVITIES: CPT | Mod: GP

## 2017-11-21 PROCEDURE — 97110 THERAPEUTIC EXERCISES: CPT | Mod: GP

## 2017-11-21 PROCEDURE — 40000193 ZZH STATISTIC PT WARD VISIT

## 2017-11-21 RX ADMIN — TERIFLUNOMIDE 14 MG: 14 TABLET, FILM COATED ORAL at 09:47

## 2017-11-21 RX ADMIN — BACLOFEN 20 MG: 10 TABLET ORAL at 16:55

## 2017-11-21 RX ADMIN — CALCIUM CARBONATE (ANTACID) CHEW TAB 500 MG 500 MG: 500 CHEW TAB at 11:58

## 2017-11-21 RX ADMIN — OMEPRAZOLE 40 MG: 20 CAPSULE, DELAYED RELEASE ORAL at 06:08

## 2017-11-21 RX ADMIN — SERTRALINE HYDROCHLORIDE 150 MG: 100 TABLET ORAL at 20:52

## 2017-11-21 RX ADMIN — POLYETHYLENE GLYCOL 400 AND PROPYLENE GLYCOL 1 DROP: 4; 3 SOLUTION/ DROPS OPHTHALMIC at 13:31

## 2017-11-21 RX ADMIN — POLYETHYLENE GLYCOL 400 AND PROPYLENE GLYCOL 1 DROP: 4; 3 SOLUTION/ DROPS OPHTHALMIC at 09:47

## 2017-11-21 RX ADMIN — OXYBUTYNIN CHLORIDE 10 MG: 10 TABLET, FILM COATED, EXTENDED RELEASE ORAL at 09:43

## 2017-11-21 RX ADMIN — LORAZEPAM 1 MG: 0.5 TABLET ORAL at 20:51

## 2017-11-21 RX ADMIN — POLYETHYLENE GLYCOL 400 AND PROPYLENE GLYCOL 1 DROP: 4; 3 SOLUTION/ DROPS OPHTHALMIC at 20:56

## 2017-11-21 RX ADMIN — MULTIPLE VITAMINS W/ MINERALS TAB 1 TABLET: TAB at 09:47

## 2017-11-21 RX ADMIN — GABAPENTIN 600 MG: 300 CAPSULE ORAL at 09:46

## 2017-11-21 RX ADMIN — GABAPENTIN 900 MG: 300 CAPSULE ORAL at 20:52

## 2017-11-21 RX ADMIN — BACLOFEN 20 MG: 20 TABLET ORAL at 20:54

## 2017-11-21 RX ADMIN — OXYBUTYNIN CHLORIDE 10 MG: 10 TABLET, FILM COATED, EXTENDED RELEASE ORAL at 20:54

## 2017-11-21 RX ADMIN — BACLOFEN 20 MG: 20 TABLET ORAL at 09:43

## 2017-11-21 RX ADMIN — CHOLECALCIFEROL CAP 125 MCG (5000 UNIT) 5000 UNITS: 125 CAP at 09:46

## 2017-11-21 RX ADMIN — NITROFURANTOIN (MACROCRYSTALS) 100 MG: 50 CAPSULE ORAL at 09:43

## 2017-11-21 NOTE — PLAN OF CARE
Problem: Goal Outcome Summary  Goal: Goal Outcome Summary  Outcome: No Change  Patient is alert x 4 and she uses her call light appropriately. B/p 102/49 this am, patient was asymptomatic. Patient c/o of a upset stomach after having mccain for breakfast and Tums given before lunch. Patient transfers from w/c to bed with one SBA and use of the transfer belt. Weight 59.104 Kg this am down from 11/6/17 weight then 63.186 kg. Patient denies any SOB.

## 2017-11-21 NOTE — PLAN OF CARE
Problem: Goal Outcome Summary  Goal: Goal Outcome Summary  Outcome: Therapy, progress toward functional goals as expected  PTA: Plan for last day of PT 11/24. Pt on track.

## 2017-11-21 NOTE — PROGRESS NOTES
CLINICAL NUTRITION SERVICES - REASSESSMENT NOTE     Nutrition Prescription    RECOMMENDATIONS FOR MDs/PROVIDERS TO ORDER:  None today    Malnutrition Status:    Patient does not meet two of the above criteria necessary for diagnosing malnutrition    Recommendations already ordered by Registered Dietitian (RD):  Weekly weight checks  Change Gelatein to lunch  Continue Ensure Plus at PM Snack    Future/Additional Recommendations:  None today     EVALUATION OF THE PROGRESS TOWARD GOALS   Diet: Regular, Ensure Plus qd and Gelatein PRN    Intake: pt has been eating 100% of meals (1 instance of 75%) per flowsheet. On average, she is ordering 1780 kcal and 58 g protein via [employer]. This is meeting 100% of minimum energy needs and 92% minimum protein needs. She has been drinking 1 Ensure Plus and 1 Gelatein daily.      NEW FINDINGS   - No updated wts recorded.   - Pt reports good appetite.    MALNUTRITION  % Intake: Decreased intake does not meet criteria  % Weight Loss: Unable to assess, previously wt was trending up  Subcutaneous Fat Loss: None observed  Muscle Loss: generalized mild vs sarcopenia  Fluid Accumulation/Edema: Mild-Moderate  Malnutrition Diagnosis: Patient does not meet two of the above criteria necessary for diagnosing malnutrition    Previous Goals   Patient to continue to consume % of nutritionally adequate meal trays TID or the equivalent with supplements/snacks.  Evaluation: Met    Previous Nutrition Diagnosis  Predicted inadequate protein intake related to patient food preferences and knowledge deficit regarding protein-containing foods as evidenced by patient average intake of 43 g protein over the last 5 days.  Evaluation: No change, updated below    CURRENT NUTRITION DIAGNOSIS  Predicted inadequate protein intake related to patient food preferences and increased protein needs as evidenced by average protein intake meeting 92% minimum protein needs over the last 5 days.       INTERVENTIONS  Implementation  Reviewed high protein diet education and continued to discuss ways to increase protein intakes at home. Pt plans to continue drinking Ensure Plus qd and already as 2 cases at home. She also likes the Gelatein supplement and would like this to be sent at lunch. She is also planning to continue eating meals TID at home and will pack high protein snacks when she goes to appointments and may not eat a meal.     Goals  Patient to continue to consume % of nutritionally adequate meal trays TID or the equivalent with supplements/snacks.    Monitoring/Evaluation  Progress toward goals will be monitored and evaluated per protocol.      Holli Levy RD, LD  Unit Pager: [phone]

## 2017-11-21 NOTE — PROGRESS NOTES
Social Work Services Progress Note    Hospital Day: 16  Date of Initial Social Work Evaluation:  11/7/2017  Collaborated with:  Pt, pt's , provider, PT/OT, Shay SINGH and Coretta ANDERSON.    Data:  Linda is a 62 year old female, admitted to the TCU on 11/6/2017    Intervention:  Care Conference     Assessment:      Please see notes from Provider, Shay SINGH and therapies to see progress on pt status.     Pt feels comfortable going home and pt's  had no immediate concerns. Plan to DC pt on 11/25/2017 if TCU goals are met and remains medically stable.     Plan:    Anticipated Disposition:  Home with services    Barriers to d/c plan:  No current barriers.     Follow Up:  JUSTIN will continue to follow and assist as needed.    STEPHANY Beltran  TCU   P: [phone]  Pgr: [phone]

## 2017-11-21 NOTE — PROGRESS NOTES
Clinic Care Coordination Contact    Situation: Patient chart reviewed by care coordinator.    Background: Patient is currently in [place] acute rehab due to MS exacerbation.     Assessment: A care conference with patient and spouse was held in the acute rehab on 11/20/17. Tentative plan is for patient to discharge home on 11/25/17 with spouse. She will have orders for PT/OT home care but does not want those services to begin until 12/4/17. She has daily infusions of [place] beginning 11/27/17 and ending 12/1/17.      Plan/Recommendations: Clinic care coordinator will continue to monitor chart and follow up with patient once she discharges home.     Aminata Castillo RN, CCM - Care Coordinator     11/21/2017    8:35 AM  [phone]

## 2017-11-21 NOTE — PLAN OF CARE
Problem: Goal Outcome Summary  Goal: Goal Outcome Summary  Outcome: No Change  Patient sleeping between cares, incontinent of bladder and staff repositioned after each incontinent episode. Patient reported slight headache but declines pain medication stating it's manageable. Patient has call light within reach, continue current plan of care.

## 2017-11-21 NOTE — PLAN OF CARE
Problem: Goal Outcome Summary  Goal: Goal Outcome Summary  Outcome: Therapy, progress toward functional goals as expected  OT - focus of therapy session on grooming/hygiene and oral hygiene routine while seated in WC at sink and light demand household tasks.    Patient making good progress with OT goals. Nursing student in to take BP during session and reports patient running low, see vital flowsheet for exact meausures.

## 2017-11-21 NOTE — PLAN OF CARE
Problem: Goal Outcome Summary  Goal: Goal Outcome Summary  Alert and oriented x4. C/O muscle spasms and received prn baclofen 20 mg at 1830. Patient also on scheduled baclofen. Lorazepam 1 mg administered at HS for anxiety. Incontinent of bladder. Heels noted with blanchable redness, elevated on pillows. Turn and reposition every 2 hours.

## 2017-11-22 LAB
ANION GAP SERPL CALCULATED.3IONS-SCNC: 6 MMOL/L (ref 3–14)
BUN SERPL-MCNC: 16 MG/DL (ref 7–30)
CALCIUM SERPL-MCNC: 8.8 MG/DL (ref 8.5–10.1)
CHLORIDE SERPL-SCNC: 111 MMOL/L (ref 94–109)
CO2 SERPL-SCNC: 28 MMOL/L (ref 20–32)
CREAT SERPL-MCNC: 0.79 MG/DL (ref 0.52–1.04)
ERYTHROCYTE [DISTWIDTH] IN BLOOD BY AUTOMATED COUNT: 14.4 % (ref 10–15)
GFR SERPL CREATININE-BSD FRML MDRD: 74 ML/MIN/1.7M2
GLUCOSE SERPL-MCNC: 123 MG/DL (ref 70–99)
HCT VFR BLD AUTO: 29.9 % (ref 35–47)
HGB BLD-MCNC: 9.6 G/DL (ref 11.7–15.7)
MCH RBC QN AUTO: 28.3 PG (ref 26.5–33)
MCHC RBC AUTO-ENTMCNC: 32.1 G/DL (ref 31.5–36.5)
MCV RBC AUTO: 88 FL (ref 78–100)
PLATELET # BLD AUTO: 102 10E9/L (ref 150–450)
POTASSIUM SERPL-SCNC: 3.6 MMOL/L (ref 3.4–5.3)
RBC # BLD AUTO: 3.39 10E12/L (ref 3.8–5.2)
SODIUM SERPL-SCNC: 145 MMOL/L (ref 133–144)
WBC # BLD AUTO: 2.7 10E9/L (ref 4–11)

## 2017-11-22 PROCEDURE — 25000132 ZZH RX MED GY IP 250 OP 250 PS 637: Performed by: INTERNAL MEDICINE

## 2017-11-22 PROCEDURE — 97530 THERAPEUTIC ACTIVITIES: CPT | Mod: GP

## 2017-11-22 PROCEDURE — 80048 BASIC METABOLIC PNL TOTAL CA: CPT | Performed by: INTERNAL MEDICINE

## 2017-11-22 PROCEDURE — 97535 SELF CARE MNGMENT TRAINING: CPT | Mod: GO | Performed by: OCCUPATIONAL THERAPIST

## 2017-11-22 PROCEDURE — 36415 COLL VENOUS BLD VENIPUNCTURE: CPT | Performed by: INTERNAL MEDICINE

## 2017-11-22 PROCEDURE — 40000193 ZZH STATISTIC PT WARD VISIT

## 2017-11-22 PROCEDURE — 12000022 ZZH R&B SNF

## 2017-11-22 PROCEDURE — 40000133 ZZH STATISTIC OT WARD VISIT: Performed by: OCCUPATIONAL THERAPIST

## 2017-11-22 PROCEDURE — 85027 COMPLETE CBC AUTOMATED: CPT | Performed by: INTERNAL MEDICINE

## 2017-11-22 RX ADMIN — OXYBUTYNIN CHLORIDE 10 MG: 10 TABLET, FILM COATED, EXTENDED RELEASE ORAL at 20:41

## 2017-11-22 RX ADMIN — TERIFLUNOMIDE 14 MG: 14 TABLET, FILM COATED ORAL at 07:55

## 2017-11-22 RX ADMIN — NITROFURANTOIN (MACROCRYSTALS) 100 MG: 50 CAPSULE ORAL at 07:53

## 2017-11-22 RX ADMIN — SERTRALINE HYDROCHLORIDE 150 MG: 100 TABLET ORAL at 20:40

## 2017-11-22 RX ADMIN — LORAZEPAM 1 MG: 0.5 TABLET ORAL at 20:40

## 2017-11-22 RX ADMIN — MULTIPLE VITAMINS W/ MINERALS TAB 1 TABLET: TAB at 07:53

## 2017-11-22 RX ADMIN — BACLOFEN 20 MG: 20 TABLET ORAL at 07:53

## 2017-11-22 RX ADMIN — ACETAMINOPHEN 650 MG: 325 TABLET, FILM COATED ORAL at 07:53

## 2017-11-22 RX ADMIN — CHOLECALCIFEROL CAP 125 MCG (5000 UNIT) 5000 UNITS: 125 CAP at 07:53

## 2017-11-22 RX ADMIN — BACLOFEN 20 MG: 20 TABLET ORAL at 20:41

## 2017-11-22 RX ADMIN — GABAPENTIN 900 MG: 300 CAPSULE ORAL at 20:40

## 2017-11-22 RX ADMIN — POLYETHYLENE GLYCOL 400 AND PROPYLENE GLYCOL 1 DROP: 4; 3 SOLUTION/ DROPS OPHTHALMIC at 20:41

## 2017-11-22 RX ADMIN — OXYBUTYNIN CHLORIDE 10 MG: 10 TABLET, FILM COATED, EXTENDED RELEASE ORAL at 07:53

## 2017-11-22 RX ADMIN — POLYETHYLENE GLYCOL 400 AND PROPYLENE GLYCOL 1 DROP: 4; 3 SOLUTION/ DROPS OPHTHALMIC at 07:55

## 2017-11-22 RX ADMIN — GABAPENTIN 600 MG: 300 CAPSULE ORAL at 07:53

## 2017-11-22 RX ADMIN — OMEPRAZOLE 40 MG: 20 CAPSULE, DELAYED RELEASE ORAL at 06:27

## 2017-11-22 NOTE — PLAN OF CARE
Problem: Goal Outcome Summary  Goal: Goal Outcome Summary  Outcome: Therapy, progress toward functional goals as expected  OT - focus of therapy session on transfer training to BSC using sliding board, LE dressing including shoes, toileting hygiene simulated to home set up.    Patient making good progress towards OT goals.

## 2017-11-22 NOTE — PLAN OF CARE
Problem: Goal Outcome Summary  Goal: Goal Outcome Summary  Outcome: No Change  RN: Pt sleeping this shift with no new complaints or concerns made. Pt is able to make needs known and uses call light appropriately; up ad jaren in room. Con't to monitor blanchable redness. Con't POC.

## 2017-11-22 NOTE — PLAN OF CARE
Problem: Goal Outcome Summary  Goal: Goal Outcome Summary  Outcome: No Change  Alert and oriented x 4, c/o muscle spasms gave baclofen around 1600, good appetite and fluid intake, done turn and reposition q2hrs, pt was up on her w/c until 1800, gave ativan at bed time.

## 2017-11-22 NOTE — PLAN OF CARE
Problem: Goal Outcome Summary  Goal: Goal Outcome Summary  RN: Pt makes needs known.  Using tylenol this morning for general aches before therapy and effective pain control. Independent in manual wheel chair.

## 2017-11-23 LAB
ERYTHROCYTE [DISTWIDTH] IN BLOOD BY AUTOMATED COUNT: 14.4 % (ref 10–15)
HCT VFR BLD AUTO: 30.3 % (ref 35–47)
HGB BLD-MCNC: 9.8 G/DL (ref 11.7–15.7)
MCH RBC QN AUTO: 28.8 PG (ref 26.5–33)
MCHC RBC AUTO-ENTMCNC: 32.3 G/DL (ref 31.5–36.5)
MCV RBC AUTO: 89 FL (ref 78–100)
PLATELET # BLD AUTO: 99 10E9/L (ref 150–450)
RBC # BLD AUTO: 3.4 10E12/L (ref 3.8–5.2)
SODIUM SERPL-SCNC: 143 MMOL/L (ref 133–144)
WBC # BLD AUTO: 2.6 10E9/L (ref 4–11)

## 2017-11-23 PROCEDURE — 36415 COLL VENOUS BLD VENIPUNCTURE: CPT | Performed by: INTERNAL MEDICINE

## 2017-11-23 PROCEDURE — 12000022 ZZH R&B SNF

## 2017-11-23 PROCEDURE — 85027 COMPLETE CBC AUTOMATED: CPT | Performed by: INTERNAL MEDICINE

## 2017-11-23 PROCEDURE — 25000132 ZZH RX MED GY IP 250 OP 250 PS 637: Performed by: INTERNAL MEDICINE

## 2017-11-23 PROCEDURE — 84295 ASSAY OF SERUM SODIUM: CPT | Performed by: INTERNAL MEDICINE

## 2017-11-23 RX ADMIN — MULTIPLE VITAMINS W/ MINERALS TAB 1 TABLET: TAB at 08:46

## 2017-11-23 RX ADMIN — SERTRALINE HYDROCHLORIDE 150 MG: 100 TABLET ORAL at 21:01

## 2017-11-23 RX ADMIN — NITROFURANTOIN (MACROCRYSTALS) 100 MG: 50 CAPSULE ORAL at 08:46

## 2017-11-23 RX ADMIN — LORAZEPAM 1 MG: 0.5 TABLET ORAL at 21:01

## 2017-11-23 RX ADMIN — POLYETHYLENE GLYCOL 400 AND PROPYLENE GLYCOL 1 DROP: 4; 3 SOLUTION/ DROPS OPHTHALMIC at 13:39

## 2017-11-23 RX ADMIN — GABAPENTIN 900 MG: 300 CAPSULE ORAL at 21:01

## 2017-11-23 RX ADMIN — POLYETHYLENE GLYCOL 400 AND PROPYLENE GLYCOL 1 DROP: 4; 3 SOLUTION/ DROPS OPHTHALMIC at 08:47

## 2017-11-23 RX ADMIN — TERIFLUNOMIDE 14 MG: 14 TABLET, FILM COATED ORAL at 08:49

## 2017-11-23 RX ADMIN — OXYBUTYNIN CHLORIDE 10 MG: 10 TABLET, FILM COATED, EXTENDED RELEASE ORAL at 08:46

## 2017-11-23 RX ADMIN — BACLOFEN 20 MG: 20 TABLET ORAL at 08:46

## 2017-11-23 RX ADMIN — OMEPRAZOLE 40 MG: 20 CAPSULE, DELAYED RELEASE ORAL at 06:35

## 2017-11-23 RX ADMIN — GABAPENTIN 600 MG: 300 CAPSULE ORAL at 08:46

## 2017-11-23 RX ADMIN — BACLOFEN 20 MG: 20 TABLET ORAL at 21:01

## 2017-11-23 RX ADMIN — CHOLECALCIFEROL CAP 125 MCG (5000 UNIT) 5000 UNITS: 125 CAP at 08:46

## 2017-11-23 RX ADMIN — OXYBUTYNIN CHLORIDE 10 MG: 10 TABLET, FILM COATED, EXTENDED RELEASE ORAL at 21:01

## 2017-11-23 NOTE — PLAN OF CARE
Problem: Goal Outcome Summary  Goal: Goal Outcome Summary  RN: Pt alert and oriented, makes needs known. Independent in manual wheel chair. Plans to discharge to home 11/25.    1500 pt left out on pass with  transporting, resume cares upon return.

## 2017-11-23 NOTE — PLAN OF CARE
Problem: Goal Outcome Summary  Goal: Goal Outcome Summary  Outcome: No Change  Pt is alert and able to communicate needs. Incontinent of urine. No bowel movements noted on this shift.  at bedside for most of evening. No redness noted to coccyx. No new concerns at this time.

## 2017-11-23 NOTE — PLAN OF CARE
Patient alert and oriented x 4. Patient informed this RN if sleeping @ 0200 for systane eye drop- do not wake her up- not given patient was sleeping. No complaints of pain and discomfort, no respiratory distress noted. Patient discharging home 11/25/17. Will continue to monitor patient's status.

## 2017-11-24 ENCOUNTER — TELEPHONE (OUTPATIENT)
Dept: PHARMACY | Facility: CLINIC | Age: 62
End: 2017-11-24

## 2017-11-24 LAB
ERYTHROCYTE [DISTWIDTH] IN BLOOD BY AUTOMATED COUNT: 14.5 % (ref 10–15)
HCT VFR BLD AUTO: 27.5 % (ref 35–47)
HGB BLD-MCNC: 8.9 G/DL (ref 11.7–15.7)
MCH RBC QN AUTO: 28.1 PG (ref 26.5–33)
MCHC RBC AUTO-ENTMCNC: 32.4 G/DL (ref 31.5–36.5)
MCV RBC AUTO: 87 FL (ref 78–100)
PLATELET # BLD AUTO: 83 10E9/L (ref 150–450)
RBC # BLD AUTO: 3.17 10E12/L (ref 3.8–5.2)
WBC # BLD AUTO: 2.6 10E9/L (ref 4–11)

## 2017-11-24 PROCEDURE — 97110 THERAPEUTIC EXERCISES: CPT | Mod: GP

## 2017-11-24 PROCEDURE — 36416 COLLJ CAPILLARY BLOOD SPEC: CPT | Performed by: INTERNAL MEDICINE

## 2017-11-24 PROCEDURE — 97530 THERAPEUTIC ACTIVITIES: CPT | Mod: GP

## 2017-11-24 PROCEDURE — 85027 COMPLETE CBC AUTOMATED: CPT | Performed by: INTERNAL MEDICINE

## 2017-11-24 PROCEDURE — 40000133 ZZH STATISTIC OT WARD VISIT: Performed by: OCCUPATIONAL THERAPIST

## 2017-11-24 PROCEDURE — 40000193 ZZH STATISTIC PT WARD VISIT

## 2017-11-24 PROCEDURE — 12000022 ZZH R&B SNF

## 2017-11-24 PROCEDURE — 97535 SELF CARE MNGMENT TRAINING: CPT | Mod: GO | Performed by: OCCUPATIONAL THERAPIST

## 2017-11-24 PROCEDURE — 25000132 ZZH RX MED GY IP 250 OP 250 PS 637

## 2017-11-24 PROCEDURE — 25000132 ZZH RX MED GY IP 250 OP 250 PS 637: Performed by: INTERNAL MEDICINE

## 2017-11-24 RX ORDER — BACLOFEN 10 MG/1
20 TABLET ORAL 2 TIMES DAILY
Qty: 90 TABLET | Refills: 0 | Status: SHIPPED | OUTPATIENT
Start: 2017-11-24 | End: 2018-10-31

## 2017-11-24 RX ORDER — POLYETHYLENE GLYCOL 3350 17 G/17G
17 POWDER, FOR SOLUTION ORAL DAILY
Qty: 30 PACKET | Refills: 1 | Status: SHIPPED | OUTPATIENT
Start: 2017-11-25 | End: 2020-07-03

## 2017-11-24 RX ORDER — AMOXICILLIN 250 MG
2 CAPSULE ORAL 2 TIMES DAILY
Qty: 100 TABLET | Refills: 1 | Status: SHIPPED | OUTPATIENT
Start: 2017-11-24 | End: 2018-10-31

## 2017-11-24 RX ORDER — ACETAMINOPHEN 325 MG/1
650 TABLET ORAL EVERY 4 HOURS PRN
Qty: 100 TABLET
Start: 2017-11-24 | End: 2019-05-04

## 2017-11-24 RX ADMIN — POLYETHYLENE GLYCOL 400 AND PROPYLENE GLYCOL 1 DROP: 4; 3 SOLUTION/ DROPS OPHTHALMIC at 08:51

## 2017-11-24 RX ADMIN — OMEPRAZOLE 40 MG: 20 CAPSULE, DELAYED RELEASE ORAL at 06:10

## 2017-11-24 RX ADMIN — GABAPENTIN 900 MG: 300 CAPSULE ORAL at 20:01

## 2017-11-24 RX ADMIN — POLYETHYLENE GLYCOL 400 AND PROPYLENE GLYCOL 1 DROP: 4; 3 SOLUTION/ DROPS OPHTHALMIC at 20:00

## 2017-11-24 RX ADMIN — NITROFURANTOIN (MACROCRYSTALS) 100 MG: 50 CAPSULE ORAL at 08:48

## 2017-11-24 RX ADMIN — CHOLECALCIFEROL CAP 125 MCG (5000 UNIT) 5000 UNITS: 125 CAP at 08:48

## 2017-11-24 RX ADMIN — OXYBUTYNIN CHLORIDE 10 MG: 10 TABLET, FILM COATED, EXTENDED RELEASE ORAL at 08:50

## 2017-11-24 RX ADMIN — MULTIPLE VITAMINS W/ MINERALS TAB 1 TABLET: TAB at 08:48

## 2017-11-24 RX ADMIN — TERIFLUNOMIDE 14 MG: 14 TABLET, FILM COATED ORAL at 08:50

## 2017-11-24 RX ADMIN — BACLOFEN 20 MG: 20 TABLET ORAL at 08:48

## 2017-11-24 RX ADMIN — SERTRALINE HYDROCHLORIDE 150 MG: 25 TABLET ORAL at 20:01

## 2017-11-24 RX ADMIN — LORAZEPAM 1 MG: 0.5 TABLET ORAL at 19:59

## 2017-11-24 RX ADMIN — BACLOFEN 20 MG: 20 TABLET ORAL at 20:01

## 2017-11-24 RX ADMIN — ACETAMINOPHEN 650 MG: 325 TABLET, FILM COATED ORAL at 09:03

## 2017-11-24 RX ADMIN — GABAPENTIN 600 MG: 300 CAPSULE ORAL at 08:48

## 2017-11-24 RX ADMIN — OXYBUTYNIN CHLORIDE 10 MG: 10 TABLET, FILM COATED, EXTENDED RELEASE ORAL at 20:01

## 2017-11-24 NOTE — UTILIZATION REVIEW
Pain Interview  DC    1. Have you had pain or hurting at any time in the last 5 days?  Yes  2. How much of the time have you experienced pain or hurting over the last 5 days? Occasionally  3. Over the past 5 days, has pain made it hard for you to sleep at night?  No  4. Over the past 5 days, have you limited your day-to-day activities because of pain?No  5. Rate pain intensity: Numeric      Verbal: Mild

## 2017-11-24 NOTE — PLAN OF CARE
Problem: Goal Outcome Summary  Goal: Goal Outcome Summary  Physical Therapy Discharge Summary    Reason for therapy discharge:    Discharged to home with home therapy.    Progress towards therapy goal(s). See goals on Care Plan in [facility] electronic health record for goal details.  Goals partially met.  Barriers to achieving goals:   All goals met with exception of gait goal. She will d/c w/c based and continue gait and standing progression with HCPT and OP PT.  .    Therapy recommendation(s):    Continued therapy is recommended.  Rationale/Recommendations:  Pt is now safe to d/c with  and HCPT. Will return to OP PT at previous clinic once appropriate..

## 2017-11-24 NOTE — PLAN OF CARE
Problem: Goal Outcome Summary  Goal: Goal Outcome Summary  Pt returned from thanksgiving dinner around 1900, Alert and oriented 4, pleasant. No c/o pain or discomfort. Requested & received ativan at HS. Call  Within reach.

## 2017-11-24 NOTE — PROGRESS NOTES
Met with patient to update the DC planning discussion. She will DC to home tomorrow 11/25,  to transport around 1pm. Will have home care PT/OT. Updated [facility] Liaison.

## 2017-11-24 NOTE — PLAN OF CARE
Problem: Goal Outcome Summary  Goal: Goal Outcome Summary  RN: Using tylenol this morning for headache complaint and effective. Independent in manual wheel chair in room and halls and outside to smoke cigarettes. Inc urine, skin intact buttock and jana area, continue to monitor.

## 2017-11-24 NOTE — PLAN OF CARE
Patient alert and oriented. Sleeps in between cares. T and R in bed. No complaints of pain and discomfort so far. Will continue to monitor patient's status.

## 2017-11-24 NOTE — PLAN OF CARE
Problem: Goal Outcome Summary  Goal: Goal Outcome Summary  Occupational Therapy Discharge Summary    Reason for therapy discharge:    Patient/family request discontinuation of services.    Progress towards therapy goal(s). See goals on Care Plan in [facility] electronic health record for goal details.  Goals partially met.  Barriers to achieving goals:   discharge from facility.    Therapy recommendation(s):    Continued therapy is recommended.  Rationale/Recommendations:  Continue to build strength and endurance for safety during transfers and functional mobility.. Recommend home OT for home safety eval and home health aide for assist with bathing.Therapist has collaborated with patient to determine best affordable options for AE. No further AE needs at this time.

## 2017-11-24 NOTE — TELEPHONE ENCOUNTER
I contacted the patient by phone regarding her upcoming appointment for [facility] on Monday 11/27/17. I reminded the patient to take her hydroxyzine and ranitidine starting on Sunday as directed by Dr Redman. I also reminded her to start taking Acyclovir on Sunday or as directed. The patient's  will bring her to the appointment and will pick her up after the infusion. I told the patient to bring any medications that she is currently taking that she might need during the day.    The patient verbalized understanding.    Jerry Galindo PharmD.

## 2017-11-24 NOTE — PROGRESS NOTES
I went to see the patient 4 time now since 11 am. She has been out of her room. Nursing reports that she is outside a lot of times. We will check on her tomorrow. If there is any new issue or concern nursing will contact me or on call physician.

## 2017-11-24 NOTE — PLAN OF CARE
Problem: Goal Outcome Summary  Goal: Goal Outcome Summary  Outcome: Therapy, progress toward functional goals as expected  OT - focus of therapy session on transfer and toileting independence simulated to home set up. Patient to transfer from  to [facility] using sliding board and problem solving through home set up for maximum safety.

## 2017-11-25 VITALS
HEART RATE: 76 BPM | OXYGEN SATURATION: 94 % | WEIGHT: 130.3 LBS | TEMPERATURE: 96.3 F | BODY MASS INDEX: 19.81 KG/M2 | RESPIRATION RATE: 16 BRPM | DIASTOLIC BLOOD PRESSURE: 62 MMHG | SYSTOLIC BLOOD PRESSURE: 121 MMHG

## 2017-11-25 PROCEDURE — 99207 ZZC NON BILLABLE SERVICE FOR UNTIMELY FILING: CPT | Performed by: HOSPITALIST

## 2017-11-25 PROCEDURE — 25000132 ZZH RX MED GY IP 250 OP 250 PS 637: Performed by: INTERNAL MEDICINE

## 2017-11-25 RX ADMIN — TERIFLUNOMIDE 14 MG: 14 TABLET, FILM COATED ORAL at 08:36

## 2017-11-25 RX ADMIN — BACLOFEN 20 MG: 20 TABLET ORAL at 08:37

## 2017-11-25 RX ADMIN — ACETAMINOPHEN 650 MG: 325 TABLET, FILM COATED ORAL at 05:09

## 2017-11-25 RX ADMIN — MULTIPLE VITAMINS W/ MINERALS TAB 1 TABLET: TAB at 08:37

## 2017-11-25 RX ADMIN — HYDROCHLOROTHIAZIDE 12.5 MG: 12.5 TABLET ORAL at 08:38

## 2017-11-25 RX ADMIN — GABAPENTIN 600 MG: 300 CAPSULE ORAL at 08:37

## 2017-11-25 RX ADMIN — POLYETHYLENE GLYCOL 400 AND PROPYLENE GLYCOL 1 DROP: 4; 3 SOLUTION/ DROPS OPHTHALMIC at 14:13

## 2017-11-25 RX ADMIN — OXYBUTYNIN CHLORIDE 10 MG: 10 TABLET, FILM COATED, EXTENDED RELEASE ORAL at 08:37

## 2017-11-25 RX ADMIN — OMEPRAZOLE 40 MG: 20 CAPSULE, DELAYED RELEASE ORAL at 05:09

## 2017-11-25 RX ADMIN — POLYETHYLENE GLYCOL 400 AND PROPYLENE GLYCOL 1 DROP: 4; 3 SOLUTION/ DROPS OPHTHALMIC at 08:36

## 2017-11-25 RX ADMIN — CHOLECALCIFEROL CAP 125 MCG (5000 UNIT) 5000 UNITS: 125 CAP at 08:37

## 2017-11-25 RX ADMIN — NITROFURANTOIN (MACROCRYSTALS) 100 MG: 50 CAPSULE ORAL at 08:37

## 2017-11-25 NOTE — PLAN OF CARE
Problem: Goal Outcome Summary  Goal: Goal Outcome Summary  Outcome: No Change  Patient alert and oriented x 4. Able to make needs known. Up in wheelchair this evening. Able to reposition well in bed. Denies pain and SOB. Requested Ativan at bedtime for anxiety. Scheduled to discharge tomorrow.

## 2017-11-25 NOTE — PLAN OF CARE
Problem: Goal Outcome Summary  Goal: Goal Outcome Summary  Outcome: No Change  Patient sleeping between cares, incontinent of urine, turned and repositioned every 2 hrs, at 0500 patient complained of pain and requested for prn Pain medication, prn Tylenol given for headache with relief. Call light is with in reach, continue to monitor

## 2017-11-25 NOTE — DISCHARGE SUMMARY
[facility]  Discharge Summary    Marylee Woods MRN# [id number]   YOB: 1955 Age: 62 year old     Date of Admission:  11/6/2017  Date of Discharge:  11/25/2017  Admitting Physician:  Basil Bacon MD  Discharge Physician:  Pretty Carter MD  Discharging Service:  Internal Medicine     Primary Provider: Carolina Pulliam            Discharge Diagnosis:     MS exacerbation    GERD    Pancytopenia, likely from medications. Needs FU with her hematology  UTI ppx    Strep Pharyngitis    HTN and Lymphedema-continue on  HCTZ  Anxiety- Ativan prn , Zoloft   Sinus head ache       Past Medical History:   Diagnosis Date     Gastro-oesophageal reflux disease      Multiple sclerosis (H)                   Discharge Medications:     Current Discharge Medication List      START taking these medications    Details   acetaminophen (TYLENOL) 325 MG tablet Take 2 tablets (650 mg) by mouth every 4 hours as needed for mild pain or fever (greater than 101 degrees)  Qty: 100 tablet    Associated Diagnoses: Multiple sclerosis exacerbation (H)      polyethylene glycol (MIRALAX/GLYCOLAX) Packet Take 17 g by mouth daily  Qty: 30 packet, Refills: 1    Associated Diagnoses: Multiple sclerosis exacerbation (H)      senna-docusate (SENOKOT-S;PERICOLACE) 8.6-50 MG per tablet Take 2 tablets by mouth 2 times daily  Qty: 100 tablet, Refills: 1    Associated Diagnoses: Multiple sclerosis exacerbation (H)         CONTINUE these medications which have CHANGED    Details   baclofen (LIORESAL) 10 MG tablet Take 2 tablets (20 mg) by mouth 2 times daily  Qty: 90 tablet, Refills: 0    Associated Diagnoses: Preop general physical exam; MS (multiple sclerosis) (H)         CONTINUE these medications which have NOT CHANGED    Details   LORazepam (ATIVAN) 1 MG tablet Take 1 tablet (1 mg) by mouth nightly as needed for anxiety  Qty: 60 tablet    Associated Diagnoses: Anxiety      !! gabapentin (NEURONTIN) 300 MG capsule Take 2  capsules (600 mg) by mouth every morning  Qty: 90 capsule    Associated Diagnoses: Paresthesia      !! gabapentin (NEURONTIN) 300 MG capsule Take 3 capsules (900 mg) by mouth At Bedtime  Qty: 90 capsule    Associated Diagnoses: Paresthesia      sertraline (ZOLOFT) 100 MG tablet Take 1.5 tablets (150 mg) by mouth daily  Qty: 135 tablet, Refills: 3    Associated Diagnoses: Major depression in complete remission (H)      AUBAGIO 14 MG tablet Take 1 tablet (14 mg) by mouth daily  Refills: 11    Associated Diagnoses: MS (multiple sclerosis) (H)      oxybutynin (DITROPAN-XL) 10 MG 24 hr tablet Take 1 tablet (10 mg) by mouth 2 times daily  Qty: 30 tablet    Associated Diagnoses: Neurogenic bladder      cholecalciferol (VITAMIN D3) 5000 UNITS CAPS capsule Take 1 capsule (5,000 Units) by mouth daily    Associated Diagnoses: MS (multiple sclerosis) (H)      POTASSIUM CHLORIDE PO Take 99 mg by mouth daily      omeprazole (PRILOSEC) 40 MG capsule Take one capsule by mouth daily with breakfast.  Refills: 3      nitrofurantoin macrocrystal (MACRODANTIN) 100 MG capsule Take 100 mg by mouth daily UTI prophylaxis  Refills: 11      valACYclovir (VALTREX) 500 MG tablet Take 1 tablet (500 mg) by mouth 2 times daily  Qty: 12 tablet, Refills: 5    Associated Diagnoses: Herpes simplex disease      NICOTROL 10 MG inhaler Inhale 1 Cartridge into the lungs daily as needed for smoking cessation   Refills: 1      ORDER FOR DME Equipment being ordered: medical lift chair  Qty: 1 Units, Refills: 0    Comments: [facility], fx# [phone]    Associated Diagnoses: MS (multiple sclerosis) (H)      multivitamin, therapeutic with minerals (THERA-VIT-M) TABS Take 1 tablet by mouth daily.    Associated Diagnoses: Anemia       !! - Potential duplicate medications found. Please discuss with provider.      STOP taking these medications       amoxicillin (AMOXIL) 500 MG capsule Comments:   Reason for Stopping:         hydrochlorothiazide 12.5  MG TABS tablet Comments:   Reason for Stopping:                    TCU Course:     62 year-old female with a h/o MS admitted to neurology service from 10/31- 11/6 with presumed acute exacerbation of her MS with worsening parasthesias, diplopia, and decreased strength. Found to have strep  Throat and had Abx and course of steroid with some improvement -MRI with and without contrast was performed of brain and c-spine and were without enhancing lesions. Question whether may be progressing to secondary progressive phase given age and worsening with lack of new enhancing lesions.  Please refer to hospital DC summary for details. She did well with  PT and OT here. She is being DCed home with home care RN, PT and OT. She uses bedside commode and wheelchair and walker.  We will continue home care OT and PT.     MS exacerbation; MRI with and without contrast was performed of brain and c-spine and were without enhancing lesions. Question whether may be progressing to secondary progressive phase given age and worsening with lack of new enhancing lesions. She was treated for acute exacerbation with 5 days of IV solumedrol with mild improvement  - CT Aubagio 14mg qd, baclofefn 20 mg bid, D3   - f/u with neurology in 1-2 months  - Continue Gabapentin   - her baclofen dose has been increased to 20 mg BID to control her spasms/spasticity better       # GERD- CT Omeprazole and TUMs      # Pancytopenia- apparently worked up as o/p by hem-onc and thought sec to medicaitons for MS or nitrofurantoin. It has been stable here. We did not change her MS medications. FU with Hematology as out pt in one to two weeks time.       # UTI ppx- on Nitrofurantoin at home. Resumed nitrofurantoin 100 mg daily     # Strep Pharyngitis- non gp A strep  - Complete Amoxicillin 500 mg bid x 10 days (finished on 11/13/17)     # HTN and Lymphedema. She was on HCTZ 12.5 mg po daily. Her BP was dropping to low 110s. We are holding the HCTZ at [place] due to low BP.  We will monitor BP at home and she can FU with PCP to decide what to do with her HCTZ.    # Anxiety- continue Ativan prn , Zoloft   # sinus head ache: appears controlled with tylenol                      Discharge Disposition:   Discharged to home           Condition on Discharge:   Discharge condition: Stable   Code status on discharge: Full Code           Procedures:     none           Final Day of Progress before Discharge:       Physical Exam:  Blood pressure 121/62, pulse 76, temperature 96.3  F (35.7  C), temperature source Oral, resp. rate 16, weight 59.1 kg (130 lb 4.8 oz), SpO2 94 %, not currently breastfeeding.  GENERAL: Alert and oriented x 3. NAD.   HEENT: Anicteric sclera. PERRL. Mucous membranes moist and without lesions.   CV: RRR. S1, S2. No murmurs appreciated.   RESPIRATORY: Effort normal. Lungs CTAB with no wheezing, rales, rhonchi.   GI: Abdomen soft and non distended with normoactive bowel sounds present in all quadrants. No tenderness, rebound, guarding.    Data:    All laboratory data reviewed             Significant Results:     Results for orders placed or performed during the hospital encounter of 11/06/17   Basic metabolic panel   Result Value Ref Range    Sodium 144 133 - 144 mmol/L    Potassium 3.2 (L) 3.4 - 5.3 mmol/L    Chloride 105 94 - 109 mmol/L    Carbon Dioxide 34 (H) 20 - 32 mmol/L    Anion Gap 5 3 - 14 mmol/L    Glucose 83 70 - 99 mg/dL    Urea Nitrogen 27 7 - 30 mg/dL    Creatinine 0.80 0.52 - 1.04 mg/dL    GFR Estimate 73 >60 mL/min/1.7m2    GFR Estimate If Black 88 >60 mL/min/1.7m2    Calcium 8.3 (L) 8.5 - 10.1 mg/dL   Potassium   Result Value Ref Range    Potassium 3.8 3.4 - 5.3 mmol/L   Potassium   Result Value Ref Range    Potassium 4.0 3.4 - 5.3 mmol/L   Glucose by meter   Result Value Ref Range    Glucose 121 (H) 70 - 99 mg/dL   CBC with platelets   Result Value Ref Range    WBC 2.7 (L) 4.0 - 11.0 10e9/L    RBC Count 3.39 (L) 3.8 - 5.2 10e12/L    Hemoglobin 9.6 (L) 11.7  - 15.7 g/dL    Hematocrit 29.9 (L) 35.0 - 47.0 %    MCV 88 78 - 100 fl    MCH 28.3 26.5 - 33.0 pg    MCHC 32.1 31.5 - 36.5 g/dL    RDW 14.4 10.0 - 15.0 %    Platelet Count 102 (L) 150 - 450 10e9/L   Basic metabolic panel   Result Value Ref Range    Sodium 145 (H) 133 - 144 mmol/L    Potassium 3.6 3.4 - 5.3 mmol/L    Chloride 111 (H) 94 - 109 mmol/L    Carbon Dioxide 28 20 - 32 mmol/L    Anion Gap 6 3 - 14 mmol/L    Glucose 123 (H) 70 - 99 mg/dL    Urea Nitrogen 16 7 - 30 mg/dL    Creatinine 0.79 0.52 - 1.04 mg/dL    GFR Estimate 74 >60 mL/min/1.7m2    GFR Estimate If Black 89 >60 mL/min/1.7m2    Calcium 8.8 8.5 - 10.1 mg/dL   Sodium   Result Value Ref Range    Sodium 143 133 - 144 mmol/L   CBC with platelets   Result Value Ref Range    WBC 2.6 (L) 4.0 - 11.0 10e9/L    RBC Count 3.40 (L) 3.8 - 5.2 10e12/L    Hemoglobin 9.8 (L) 11.7 - 15.7 g/dL    Hematocrit 30.3 (L) 35.0 - 47.0 %    MCV 89 78 - 100 fl    MCH 28.8 26.5 - 33.0 pg    MCHC 32.3 31.5 - 36.5 g/dL    RDW 14.4 10.0 - 15.0 %    Platelet Count 99 (L) 150 - 450 10e9/L   CBC with platelets   Result Value Ref Range    WBC 2.6 (L) 4.0 - 11.0 10e9/L    RBC Count 3.17 (L) 3.8 - 5.2 10e12/L    Hemoglobin 8.9 (L) 11.7 - 15.7 g/dL    Hematocrit 27.5 (L) 35.0 - 47.0 %    MCV 87 78 - 100 fl    MCH 28.1 26.5 - 33.0 pg    MCHC 32.4 31.5 - 36.5 g/dL    RDW 14.5 10.0 - 15.0 %    Platelet Count 83 (L) 150 - 450 10e9/L   Psychiatry IP Consult: anxiety; Consultant may enter orders: Yes; Patient to be seen: Routine; Call back #: [phone]    Narrative    Raimundo Marino MD     11/7/2017  2:11 PM  Pt seen for 60 min for initial psych consult      No results found for this or any previous visit (from the past 48 hour(s)).             Pending Results:   Unresulted Labs Ordered in the Past 30 Days of this Admission     No orders found from 9/7/2017 to 11/7/2017.                  Discharge Instructions and Follow-Up:     Discharge Procedure Orders  SPHYG/BP ABHISHEK W CUFF AND STET  "  Order Comments: Use as directed to check BP     HOME BLOOD PRESSURE MONITORING   Scheduling Instructions: Home blood pressure monitorin. Avoid eating, smoking, and exercising for at least 30 minutes before taking a reading.    2. Put your left arm through the cuff loop. The bottom of the cuff should be about 1/2\" above your elbow. The cuff tubing should be positioned along the middle of the inside of your arm.    3. Pull the cuff so it is tightened evenly around your arm. Press the velcro material together to secure the cuff.    4. Sit in a chair with your feet flat on the floor. Rest your left arm on a table so that the arm cuff is at the same level as your heart.    5. Turn the monitor on. When the heart symbol appears next to a zero, the monitor is ready to measure. Inflate the cuff using either the automatic start button or the inflation bulb, depending on which monitor you are using.    6. Remain still during the reading. The cuff will deflate automatically.    7. When measurement is complete, your blood pressure and pulse readings will alternately display on the digital panel.    8. Wait 5-10 minutes before taking another reading.     Home Care PT Referral for Hospital Discharge   Referral Type: Home Health Therapies & Aides     Home Care OT Referral for Hospital Discharge     Reason for your hospital stay   Order Comments: Admitted with MS exacerbation. Was in TCU for rehab. Doing well., so being Discharged home.     Adult [facility] Follow-up and recommended labs and tests   Order Comments: Follow up with primary care provider, Carolina Pulliam, within 7 days for hospital follow- up.  No follow up labs or test are needed.  FU with Neurology in one weeks time. Re Recent MS exacerbation fu      Appointments on [place] and/or [facility] (with [facility] or [facility] provider or service). Call [phone] if you haven't heard regarding these appointments within 7 days of discharge.     Activity   Order " Comments: Your activity upon discharge: activity as tolerated   Order Specific Question Answer Comments   Is discharge order? Yes      MD face to face encounter   Order Comments: Documentation of Face to Face and Certification for Home Health Services    I certify that patient: Marylee Woods is under my care and that I, or a nurse practitioner or physician's assistant working with me, had a face-to-face encounter that meets the physician face-to-face encounter requirements with this patient on: 11/24/2017.    This encounter with the patient was in whole, or in part, for the following medical condition, which is the primary reason for home health care: weakness from multiple scleroris.    I certify that, based on my findings, the following services are medically necessary home health services: Nursing, Occupational Therapy and Physical Therapy.    My clinical findings support the need for the above services because: Nurse is needed: To provide assessment and oversight required in the home to assure adherence to the medical plan due to: severe multiple sclerosis. and To teach and train about the disease and treatments for MS  illness    Further, I certify that my clinical findings support that this patient is homebound (i.e. absences from home require considerable and taxing effort and are for medical reasons or Muslim services or infrequently or of short duration when for other reasons) because: Leaving home is medically contraindicated for the following reason(s): can not do it as she has severe weakness from MS..    Based on the above findings. I certify that this patient is confined to the home and needs intermittent skilled nursing care, physical therapy and/or speech therapy.  The patient is under my care, and I have initiated the establishment of the plan of care.  This patient will be followed by a physician who will periodically review the plan of care.  Physician/Provider to provide follow up care:  Carolina Pulliam    Attending hospital physician (the Medicare certified [facility] provider): Pretty Carter  Physician Signature: See electronic signature associated with these discharge orders.  Date: 11/24/2017     Diet   Order Comments: Follow this diet upon discharge: Orders Placed This Encounter     Snacks/Supplements Adult: Ensure Plus (Adult); Between Meals     Snacks/Supplements Adult: Other - Please comment; Gelatein; With Meals     Regular Diet Adult   Order Specific Question Answer Comments   Is discharge order? Yes         Pretty Carter  Internal Medicine Staff Hospitalist  [phone]  November 25, 2017      Time spent on patient: 40 minutes total including face to face and coordinating care time reviewing current illness, any medication changes, and the care plan for today.

## 2017-11-25 NOTE — PLAN OF CARE
Problem: Goal Outcome Summary  Goal: Goal Outcome Summary  Outcome: Adequate for Discharge Date Met: 11/25/17  Pt was discharged home with her , Manuel at 1440. Pt verbalized understanding of discharge instructions, medications and appointments. Declined the need to have medications filled at the discharge pharmacy. Pt indicated that she has all her medications at home. Had a bowel movement prior to discharge, no complaints of discomfort.

## 2017-11-27 ENCOUNTER — INFUSION THERAPY VISIT (OUTPATIENT)
Dept: INFUSION THERAPY | Facility: CLINIC | Age: 62
End: 2017-11-27
Attending: PSYCHIATRY & NEUROLOGY
Payer: COMMERCIAL

## 2017-11-27 ENCOUNTER — CARE COORDINATION (OUTPATIENT)
Dept: CARE COORDINATION | Facility: CLINIC | Age: 62
End: 2017-11-27

## 2017-11-27 VITALS
TEMPERATURE: 98.5 F | RESPIRATION RATE: 16 BRPM | HEART RATE: 96 BPM | DIASTOLIC BLOOD PRESSURE: 73 MMHG | OXYGEN SATURATION: 98 % | SYSTOLIC BLOOD PRESSURE: 120 MMHG

## 2017-11-27 DIAGNOSIS — G35 MS (MULTIPLE SCLEROSIS) (H): Primary | ICD-10-CM

## 2017-11-27 PROCEDURE — 96367 TX/PROPH/DG ADDL SEQ IV INF: CPT

## 2017-11-27 PROCEDURE — 96413 CHEMO IV INFUSION 1 HR: CPT

## 2017-11-27 PROCEDURE — 25000132 ZZH RX MED GY IP 250 OP 250 PS 637: Performed by: PSYCHIATRY & NEUROLOGY

## 2017-11-27 PROCEDURE — 96415 CHEMO IV INFUSION ADDL HR: CPT

## 2017-11-27 PROCEDURE — 25000128 H RX IP 250 OP 636: Performed by: PSYCHIATRY & NEUROLOGY

## 2017-11-27 RX ORDER — ACETAMINOPHEN 500 MG
1000 TABLET ORAL ONCE
Status: COMPLETED | OUTPATIENT
Start: 2017-11-27 | End: 2017-11-27

## 2017-11-27 RX ORDER — DIPHENHYDRAMINE HYDROCHLORIDE 50 MG/ML
25 INJECTION INTRAMUSCULAR; INTRAVENOUS
Status: CANCELLED | OUTPATIENT
Start: 2017-11-27

## 2017-11-27 RX ORDER — DIPHENHYDRAMINE HCL 25 MG
25 CAPSULE ORAL EVERY 4 HOURS PRN
Status: CANCELLED
Start: 2017-11-27

## 2017-11-27 RX ORDER — HYDROXYZINE HYDROCHLORIDE 50 MG/1
50 TABLET, FILM COATED ORAL ONCE
Status: CANCELLED
Start: 2017-11-27 | End: 2017-11-27

## 2017-11-27 RX ORDER — ACETAMINOPHEN 500 MG
1000 TABLET ORAL ONCE
Status: CANCELLED | OUTPATIENT
Start: 2017-11-27

## 2017-11-27 RX ORDER — MEPERIDINE HYDROCHLORIDE 50 MG/1
50 TABLET ORAL EVERY 4 HOURS PRN
Status: CANCELLED
Start: 2017-11-27

## 2017-11-27 RX ORDER — HYDROXYZINE HYDROCHLORIDE 50 MG/1
50 TABLET, FILM COATED ORAL ONCE
Status: COMPLETED | OUTPATIENT
Start: 2017-11-27 | End: 2017-11-27

## 2017-11-27 RX ORDER — PROMETHAZINE HYDROCHLORIDE 25 MG/ML
25 INJECTION, SOLUTION INTRAMUSCULAR; INTRAVENOUS
Status: CANCELLED
Start: 2017-11-27

## 2017-11-27 RX ORDER — EPINEPHRINE 0.3 MG/.3ML
0.3 INJECTION SUBCUTANEOUS
Status: CANCELLED | OUTPATIENT
Start: 2017-11-27

## 2017-11-27 RX ORDER — HYDROXYZINE HYDROCHLORIDE 50 MG/1
50 TABLET, FILM COATED ORAL
Status: CANCELLED
Start: 2017-11-27

## 2017-11-27 RX ORDER — ACETAMINOPHEN 500 MG
1000 TABLET ORAL EVERY 4 HOURS PRN
Status: CANCELLED | OUTPATIENT
Start: 2017-11-27

## 2017-11-27 RX ORDER — MEPERIDINE HYDROCHLORIDE 50 MG/1
50 TABLET ORAL ONCE
Status: COMPLETED | OUTPATIENT
Start: 2017-11-27 | End: 2017-11-27

## 2017-11-27 RX ORDER — MEPERIDINE HYDROCHLORIDE 50 MG/1
50 TABLET ORAL ONCE
Status: CANCELLED
Start: 2017-11-27 | End: 2017-11-27

## 2017-11-27 RX ORDER — IBUPROFEN 200 MG
800 TABLET ORAL EVERY 6 HOURS PRN
Status: CANCELLED | OUTPATIENT
Start: 2017-11-27

## 2017-11-27 RX ADMIN — RENAGEL 12 MG: 400 TABLET ORAL at 09:48

## 2017-11-27 RX ADMIN — SODIUM CHLORIDE 1000 MG: 0.9 INJECTION, SOLUTION INTRAVENOUS at 08:45

## 2017-11-27 RX ADMIN — MEPERIDINE HYDROCHLORIDE 50 MG: 50 TABLET ORAL at 09:19

## 2017-11-27 RX ADMIN — ACETAMINOPHEN 1000 MG: 500 TABLET ORAL at 09:16

## 2017-11-27 RX ADMIN — HYDROXYZINE HYDROCHLORIDE 50 MG: 50 TABLET, FILM COATED ORAL at 09:19

## 2017-11-27 RX ADMIN — RANITIDINE 150 MG: 150 TABLET, FILM COATED ORAL at 09:17

## 2017-11-27 ASSESSMENT — PAIN SCALES - GENERAL: PAINLEVEL: NO PAIN (0)

## 2017-11-27 NOTE — PROGRESS NOTES
"Lemtrada Infusion Nursing Note:  Marylee Woods presents today for Lemtrada Day 1.    Patient seen by provider today: No   present during visit today: Not Applicable.    Note: Patient noted to be very sleepy throughout entire infusion day and at times difficult to awaken. VSS throughout the day and patient oriented x3. Patient states this is normal for her and that \"sometimes she falls asleep and people have a hard time awakening her.\" Patient did receive Demerol as premed today. Patient also stating that she took Gabapentin and Baclofen this morning. RN questioning whether or not Demerol should be given as a premed for her consecutive treatments as she is quite drowsy and tolerated the Lemtrada infusion well.     Intravenous Access:  Peripheral IV placed.    Pre infusion Assessment:    1. Is patient authorized and have they received the Lemtrada treatment and infusion reactions patient guide? YES.  2. If ordered, has patient taken pre-medications? YES.  3. Does patient have any of the following signs of infection: fever, cough, sore throat, wounds, UTI symptoms? NO.  4. Is patient taking any oral, IV or topical anti-infective medication? YES. Taking Valcyclovir as prescribed by Dr. Redman.  5. Has patient been in contact with anyone with TB? NO.  6. Has patient traveled outside the country in the last 21 days? NO.  7. Has patient had a flu shot or vaccine in the last 6 weeks? NO  8. Plan~ A. Therapy is appropriate, will proceed with treatment today?                                        YES.      B. Medication will be held today and patient will be re-assessed at a later                      Date: NO.    Lemtrada - REMS requirements:    9. Lemtrada REMS infusion checklist reviewed and proper information documented: YES.  10. Lemtrada REMS infusion checklist completed online daily and submitted after FINAL infusion: Not Applicable.  11. Post-infusion instructions reviewed and given to patient: YES.  15. " Adverse reactions reported to: N/A.      Post Infusion Assessment:  Patient tolerated infusion without incident.  Patient observed for 120 minutes post Lemtrada per protocol.  Blood return noted pre and post infusion.  Site patent and intact, free from redness, edema or discomfort.  No evidence of extravasations.  Access saline locked and left in place for tomorrow's infusion.    Discharge Plan:   Patient declined prescription refills.  Discharge instructions reviewed with: Patient.  Patient verbalized understanding of discharge instructions and all questions answered.  Copy of AVS reviewed with patient.  Patient will return 11/28/17 for next appointment.  Patient discharged in stable condition accompanied by: self.  Departure Mode: Wheelchair.    Maru Cash RN

## 2017-11-27 NOTE — PROGRESS NOTES
Clinic Care Coordination Contact  Care Coordination Communication    Referral Source: [facility]    Clinical Data: Patient was hospitalized at [facility] from 10/31/17 to 11/6/17 with diagnosis of MS exacerbation. Patient transferred to [facility] rehab [place] on 11/6/17 and was discharged to home on 11/25/17.     Patient is receiving daily Lemtrada infusions this week.  She will not start home care until next week.     Home Care Contact:              Home Care Agency: [place]               Contact name () and phone number: [phone]   not assigned at this time.               Care Coordination contacted home care: Yes              Anticipated start of care date: 12/4/17    Patient Contact:               Introduced self and role of care coordination.  Left message with patient and requested a return call at her convenience.                   Plan: RN Care Coordinator will await notification from home care staff informing RN Care Coordinator of patients discharge plans/needs. RN Care Coordinator will review chart and outreach to home care every 4 weeks and as needed.      Aminata Castillo RN, CCM - Care Coordinator     11/27/2017    1:45 PM  [phone]

## 2017-11-27 NOTE — MR AVS SNAPSHOT
After Visit Summary   11/27/2017    Marylee Woods    MRN: [id number]           Patient Information     Date Of Birth          1955        Visit Information        Provider Department      11/27/2017 8:00 AM  INFUSION CHAIR 12 [facility]        Today's Diagnoses     MS (multiple sclerosis) (H)    -  1      Care Instructions    Thank you for choosing [facility] [place]. The following information is a summary of your appointment as well as important reminders:      Last dose Tylenol was 9:15am.  Last dose Ibuprofen was: none taken today.  Last dose Benadryl was: none taken today.    Care for yourself after your infusions:  1.  Notify your doctor or seek appropriate medical attention as soon as possible if you experience any discomfort or anything that feels out of the ordinary--including swelling in your mouth or throat, trouble breathing, weakness; fast, slow or irregular heartbeat, chest pain or rash.    2.  Please continue all post infusion medications as prescribed by your healthcare provider.  3.  Stay hydrated.  Be sure to drink plenty of water.  4.  Rest.  You may feel tired, so take it easy and don't overexert yourself.  5.  Eat your regular meals and usual portion sizes as tolerated.   6.  Be mindful of infections and ways to help reduce your risk.      We look forward in seeing you on your next appointment here at [facility] and [facility].  Please don t hesitate to call us at [phone] to reschedule any of your appointments or to speak with one of our registered nurses.  It was a pleasure taking care of you today.    Sincerely,    [facility]  [facility]  [address] [place] [address]  Phone: [phone]            Follow-ups after your visit        Your next 10 appointments already scheduled     Nov 28, 2017  8:00  AM CST   Level 7 with SH INFUSION CHAIR 13   [facility] ([facility])    formerly [facility] [place]  [address]  [place] [address]   [phone]            Nov 29, 2017  8:00 AM CST   Level 7 with SH INFUSION CHAIR 19   [facility] ([facility])    formerly [facility] [place]  [address]  [place] [address]   [phone]            Nov 30, 2017  8:00 AM CST   Level 7 with SH INFUSION CHAIR 11   [facility] ([facility])    formerly [facility] [place]  [address]  [place] [address]   [phone]            Dec 01, 2017  8:00 AM CST   Level 7 with SH INFUSION CHAIR 7   [facility] ([facility])    formerly [facility] [place]  [address]  [place] [address]   [phone]              Who to contact     If you have questions or need follow up information about today's clinic visit or your schedule please contact [facility] directly at [phone].  Normal or non-critical lab and imaging results will be communicated to you by Shirley Mae'shart, letter or phone within 4 business days after the clinic has received the results. If you do not hear from us within 7 days, please contact the clinic through Shirley Mae'shart or phone. If you have a critical or abnormal lab result, we will notify you by phone as soon as possible.  Submit refill requests through [employer] or call your pharmacy and they will forward the refill request to us. Please allow 3 business days for your refill to be completed.          Additional Information About Your Visit        [employer] Information     [employer] lets you send messages to your doctor, view your test results, renew your prescriptions, schedule appointments and more. To sign up, go to [email or web address]  [email or web address] Click on \"Log in\" on the left side of the screen, which will take you to the Welcome page. Then click on \"Sign up Now\" on the right side of the page.     You will be asked to enter the access code listed below, as well as some personal information. Please follow the directions to create your username and password.     Your access code is: [email or web address]  Expires: 2018 11:13 AM     Your access code will  in 90 days. If you need help or a new code, please call your [facility] or [phone].        Care EveryWhere ID     This is your Care EveryWhere ID. This could be used by other organizations to access your [place] medical records  [id number]        Your Vitals Were     Pulse Temperature Respirations Last Period Pulse Oximetry       98 98.6  F (37  C) (Oral) 16 (LMP Unknown) 98%        Blood Pressure from Last 3 Encounters:   17 121/70   17 121/62   17 141/58    Weight from Last 3 Encounters:   17 59.1 kg (130 lb 4.8 oz)   17 64.5 kg (142 lb 3.2 oz)   17 58.5 kg (129 lb)              Today, you had the following     No orders found for display         Today's Medication Changes          These changes are accurate as of: 17  1:40 PM.  If you have any questions, ask your nurse or doctor.               These medicines have changed or have updated prescriptions.        Dose/Directions    valACYclovir 500 MG tablet   Commonly known as:  VALTREX   This may have changed:    - when to take this  - reasons to take this   Used for:  Herpes simplex disease        Dose:  500 mg   Take 1 tablet (500 mg) by mouth 2 times daily   Quantity:  12 tablet   Refills:  5                Primary Care Provider Office Phone # Fax #    Carolina Pulliam [phone]       [facility] [place] [facility] [address]        Equal Access to Services     BRIE PALOMINO AH: Liset Medrano, pete jorge, kaylen issa " lavioleta watkins. So [facility] [phone].    ATENCIÓN: Si jerricala charis, tiene a de oliveira disposición servicios gratuitos de asistencia lingüística. Mildred wade [phone].    We comply with applicable federal civil rights laws and [place] laws. We do not discriminate on the basis of race, color, national origin, age, disability, sex, sexual orientation, or gender identity.            Thank you!     Thank you for choosing [facility] AND [facility]  for your care. Our goal is always to provide you with excellent care. Hearing back from our patients is one way we can continue to improve our services. Please take a few minutes to complete the written survey that you may receive in the mail after your visit with us. Thank you!             Your Updated Medication List - Protect others around you: Learn how to safely use, store and throw away your medicines at www.disposemymeds.org.          This list is accurate as of: 11/27/17  1:40 PM.  Always use your most recent med list.                   Brand Name Dispense Instructions for use Diagnosis    acetaminophen 325 MG tablet    TYLENOL    100 tablet    Take 2 tablets (650 mg) by mouth every 4 hours as needed for mild pain or fever (greater than 101 degrees)    Multiple sclerosis exacerbation (H)       AUBAGIO 14 MG tablet   Generic drug:  teriflunomide      Take 1 tablet (14 mg) by mouth daily    MS (multiple sclerosis) (H)       baclofen 10 MG tablet    LIORESAL    90 tablet    Take 2 tablets (20 mg) by mouth 2 times daily    Preop general physical exam, MS (multiple sclerosis) (H)       cholecalciferol 5000 UNITS Caps capsule    vitamin D3     Take 1 capsule (5,000 Units) by mouth daily    MS (multiple sclerosis) (H)       * gabapentin 300 MG capsule    NEURONTIN    90 capsule    Take 2 capsules (600 mg) by mouth every morning    Paresthesia       * gabapentin 300 MG capsule    NEURONTIN    90 capsule    Take 3 capsules (900 mg) by mouth At Bedtime    Paresthesia        LORazepam 1 MG tablet    ATIVAN    60 tablet    Take 1 tablet (1 mg) by mouth nightly as needed for anxiety    Anxiety       multivitamin, therapeutic with minerals Tabs tablet      Take 1 tablet by mouth daily.    Anemia       NICOTROL 10 MG Inhaler   Generic drug:  nicotine      Inhale 1 Cartridge into the lungs daily as needed for smoking cessation        nitroFURantoin macrocrystal 100 MG capsule    MACRODANTIN     Take 100 mg by mouth daily UTI prophylaxis        omeprazole 40 MG capsule    priLOSEC     Take one capsule by mouth daily with breakfast.        order for DME     1 Units    Equipment being ordered: medical lift chair    MS (multiple sclerosis) (H)       oxybutynin 10 MG 24 hr tablet    DITROPAN-XL    30 tablet    Take 1 tablet (10 mg) by mouth 2 times daily    Neurogenic bladder       polyethylene glycol Packet    MIRALAX/GLYCOLAX    30 packet    Take 17 g by mouth daily    Multiple sclerosis exacerbation (H)       POTASSIUM CHLORIDE PO      Take 99 mg by mouth daily        senna-docusate 8.6-50 MG per tablet    SENOKOT-S;PERICOLACE    100 tablet    Take 2 tablets by mouth 2 times daily    Multiple sclerosis exacerbation (H)       sertraline 100 MG tablet    ZOLOFT    135 tablet    Take 1.5 tablets (150 mg) by mouth daily    Major depression in complete remission (H)       valACYclovir 500 MG tablet    VALTREX    12 tablet    Take 1 tablet (500 mg) by mouth 2 times daily    Herpes simplex disease       * Notice:  This list has 2 medication(s) that are the same as other medications prescribed for you. Read the directions carefully, and ask your doctor or other care provider to review them with you.

## 2017-11-27 NOTE — PATIENT INSTRUCTIONS
Thank you for choosing [facility] [place]. The following information is a summary of your appointment as well as important reminders:      Last dose Tylenol was 9:15am.  Last dose Ibuprofen was: none taken today.  Last dose Benadryl was: none taken today.    Care for yourself after your infusions:  1.  Notify your doctor or seek appropriate medical attention as soon as possible if you experience any discomfort or anything that feels out of the ordinary--including swelling in your mouth or throat, trouble breathing, weakness; fast, slow or irregular heartbeat, chest pain or rash.    2.  Please continue all post infusion medications as prescribed by your healthcare provider.  3.  Stay hydrated.  Be sure to drink plenty of water.  4.  Rest.  You may feel tired, so take it easy and don't overexert yourself.  5.  Eat your regular meals and usual portion sizes as tolerated.   6.  Be mindful of infections and ways to help reduce your risk.      We look forward in seeing you on your next appointment here at [facility].  Please don t hesitate to call us at [phone] to reschedule any of your appointments or to speak with one of our registered nurses.  It was a pleasure taking care of you today.    Sincerely,    [facility]  [facility]  [address] [place] [address]  Phone: [phone]

## 2017-11-28 ENCOUNTER — INFUSION THERAPY VISIT (OUTPATIENT)
Dept: INFUSION THERAPY | Facility: CLINIC | Age: 62
End: 2017-11-28
Attending: PSYCHIATRY & NEUROLOGY
Payer: COMMERCIAL

## 2017-11-28 VITALS
DIASTOLIC BLOOD PRESSURE: 73 MMHG | TEMPERATURE: 98.3 F | SYSTOLIC BLOOD PRESSURE: 139 MMHG | HEART RATE: 81 BPM | OXYGEN SATURATION: 99 % | RESPIRATION RATE: 16 BRPM

## 2017-11-28 DIAGNOSIS — G35 MS (MULTIPLE SCLEROSIS) (H): Primary | ICD-10-CM

## 2017-11-28 PROCEDURE — 25000132 ZZH RX MED GY IP 250 OP 250 PS 637: Performed by: PSYCHIATRY & NEUROLOGY

## 2017-11-28 PROCEDURE — 25000128 H RX IP 250 OP 636: Performed by: PSYCHIATRY & NEUROLOGY

## 2017-11-28 PROCEDURE — 96415 CHEMO IV INFUSION ADDL HR: CPT

## 2017-11-28 PROCEDURE — 96367 TX/PROPH/DG ADDL SEQ IV INF: CPT

## 2017-11-28 PROCEDURE — 96413 CHEMO IV INFUSION 1 HR: CPT

## 2017-11-28 RX ORDER — HYDROXYZINE HYDROCHLORIDE 50 MG/1
50 TABLET, FILM COATED ORAL
Status: CANCELLED
Start: 2017-11-28

## 2017-11-28 RX ORDER — PROMETHAZINE HYDROCHLORIDE 25 MG/ML
25 INJECTION, SOLUTION INTRAMUSCULAR; INTRAVENOUS
Status: CANCELLED
Start: 2017-11-28

## 2017-11-28 RX ORDER — DIPHENHYDRAMINE HCL 25 MG
25 CAPSULE ORAL EVERY 4 HOURS PRN
Status: CANCELLED
Start: 2017-11-28

## 2017-11-28 RX ORDER — ACETAMINOPHEN 500 MG
1000 TABLET ORAL EVERY 4 HOURS PRN
Status: CANCELLED | OUTPATIENT
Start: 2017-11-28

## 2017-11-28 RX ORDER — ACETAMINOPHEN 500 MG
1000 TABLET ORAL ONCE
Status: CANCELLED | OUTPATIENT
Start: 2017-11-28

## 2017-11-28 RX ORDER — MEPERIDINE HYDROCHLORIDE 50 MG/1
50 TABLET ORAL EVERY 4 HOURS PRN
Status: CANCELLED
Start: 2017-11-28

## 2017-11-28 RX ORDER — DIPHENHYDRAMINE HYDROCHLORIDE 50 MG/ML
25 INJECTION INTRAMUSCULAR; INTRAVENOUS
Status: CANCELLED | OUTPATIENT
Start: 2017-11-28

## 2017-11-28 RX ORDER — HYDROXYZINE HYDROCHLORIDE 50 MG/1
50 TABLET, FILM COATED ORAL ONCE
Status: CANCELLED
Start: 2017-11-28 | End: 2017-11-28

## 2017-11-28 RX ORDER — MEPERIDINE HYDROCHLORIDE 50 MG/1
50 TABLET ORAL ONCE
Status: CANCELLED
Start: 2017-11-28 | End: 2017-11-28

## 2017-11-28 RX ORDER — EPINEPHRINE 0.3 MG/.3ML
0.3 INJECTION SUBCUTANEOUS
Status: CANCELLED | OUTPATIENT
Start: 2017-11-28

## 2017-11-28 RX ORDER — ACETAMINOPHEN 500 MG
1000 TABLET ORAL ONCE
Status: COMPLETED | OUTPATIENT
Start: 2017-11-28 | End: 2017-11-28

## 2017-11-28 RX ORDER — IBUPROFEN 200 MG
800 TABLET ORAL EVERY 6 HOURS PRN
Status: CANCELLED | OUTPATIENT
Start: 2017-11-28

## 2017-11-28 RX ADMIN — RENAGEL 12 MG: 400 TABLET ORAL at 10:02

## 2017-11-28 RX ADMIN — SODIUM CHLORIDE 1000 MG: 0.9 INJECTION, SOLUTION INTRAVENOUS at 08:56

## 2017-11-28 RX ADMIN — ACETAMINOPHEN 1000 MG: 500 TABLET ORAL at 08:55

## 2017-11-28 ASSESSMENT — PAIN SCALES - GENERAL: PAINLEVEL: EXTREME PAIN (8)

## 2017-11-28 NOTE — PROGRESS NOTES
"[facility] Infusion Nursing Note:  Marylee Woods presents today for Lemtrada.    Patient seen by provider today: No   present during visit today: Not Applicable.    Note: Only change to report is HA and refuses Demerol as she was \"so out of it\" and \"snowed\" yesterday. She did take home supply of Zantac and Atarax here..    Intravenous Access:  Peripheral IV placed.    Pre infusion Assessment:    1. Is patient authorized and have they received the Lemtrada treatment and infusion reactions patient guide? YES.  2. If ordered, has patient taken pre-medications? YES.  3. Does patient have any of the following signs of infection: fever, cough, sore throat, wounds, UTI symptoms? NO.  4. Is patient taking any oral, IV or topical anti-infective medication? YES.  5. Has patient been in contact with anyone with TB? NO.  6. Has patient traveled outside the country in the last 21 days? NO.  7. Has patient had a flu shot or vaccine in the last 6 weeks? NO  8. Plan~ A. Therapy is appropriate, will proceed with treatment today?                                        YES.      B. Medication will be held today and patient will be re-assessed at a later                      Date: NO.    Lemtrada - REMS requirements:    9. [facility]ada REMS infusion checklist reviewed and proper information documented: YES.  10. Lem[facility]ada REMS infusion checklist completed online daily and submitted after FINAL infusion: YES.  11. Post-infusion instructions reviewed and given to patient: YES.  15. Adverse reactions reported to: N/A.      Post Infusion Assessment:  Patient tolerated infusion without incident.  Patient observed for 120 minutes post Lemtrada per protocol.  Blood return noted pre and post infusion.  Site patent and intact, free from redness, edema or discomfort.  No evidence of extravasations.  IV left in for return tomorrow..    Discharge Plan:   Discharge instructions reviewed with: Family.  Patient and/or family verbalized " understanding of discharge instructions and all questions answered.  Copy of AVS reviewed with patient and/or family.  Patient will return tomorrow for next appointment.  Patient discharged in stable condition accompanied by: .  Departure Mode: Wheelchair.    Maritza Nur RN

## 2017-11-28 NOTE — MR AVS SNAPSHOT
After Visit Summary   11/28/2017    Marylee Woods    MRN: [id number]           Patient Information     Date Of Birth          1955        Visit Information        Provider Department      11/28/2017 8:00 AM  INFUSION CHAIR 13 [facility]        Today's Diagnoses     MS (multiple sclerosis) (H)    -  1      Care Instructions    Thank you for choosing [facility]. The following information is a summary of your appointment as well as important reminders:      Last dose Tylenol was 0855 am      Care for yourself after your infusions:  1.  Notify your doctor or seek appropriate medical attention as soon as possible if you experience any discomfort or anything that feels out of the ordinary--including swelling in your mouth or throat, trouble breathing, weakness; fast, slow or irregular heartbeat, chest pain or rash.    2.  Please continue all post infusion medications as prescribed by your healthcare provider.  3.  Stay hydrated.  Be sure to drink plenty of water.  4.  Rest.  You may feel tired, so take it easy and don't overexert yourself.  5.  Eat your regular meals and usual portion sizes as tolerated.   6.  Be mindful of infections and ways to help reduce your risk.      We look forward in seeing you on your next appointment here at [facility] [place].  Please don t hesitate to call us at [phone] to reschedule any of your appointments or to speak with one of our registered nurses.  It was a pleasure taking care of you today.    Sincerely,    [facility]  [facility]  [address]  [place] [address]  Phone: [phone]            Follow-ups after your visit        Your next 10 appointments already scheduled     Nov 29, 2017  8:00 AM CST   Level 7 with  INFUSION CHAIR 19   [facility]  "Center ([facility])    [facility] [place]  [address]  [place] [address]   [phone]            Nov 30, 2017  8:00 AM CST   Level 7 with SH INFUSION CHAIR 11   [facility] and Infusion Center ([facility])    [facility] [place]  [address]  [place] [address]   [phone]            Dec 01, 2017  8:00 AM CST   Level 7 with SH INFUSION CHAIR 7   [facility] ([facility])    [facility] [place]  [address]  [place] [address] [phone]              Who to contact     If you have questions or need follow up information about today's clinic visit or your schedule please contact [facility] directly at [phone].  Normal or non-critical lab and imaging results will be communicated to you by [employer], letter or phone within 4 business days after the clinic has received the results. If you do not hear from us within 7 days, please contact the clinic through [employer] or phone. If you have a critical or abnormal lab result, we will notify you by phone as soon as possible.  Submit refill requests through [employer] or call your pharmacy and they will forward the refill request to us. Please allow 3 business days for your refill to be completed.          Additional Information About Your Visit        [employer] Information     [employer] lets you send messages to your doctor, view your test results, renew your prescriptions, schedule appointments and more. To sign up, go to [email or web address] . Click on \"Log in\" on the left side of the screen, which will take you to the Welcome page. Then click on \"Sign up Now\" on the right side of the page.     You will be asked to enter the access code listed below, as well as some personal information. Please follow the directions to create your username and password.     Your " access code is: [email or web address]  Expires: 2018 11:13 AM     Your access code will  in 90 days. If you need help or a new code, please call your [place] clinic or [phone].        Care EveryWhere ID     This is your Care EveryWhere ID. This could be used by other organizations to access your [place] medical records  [id number]        Your Vitals Were     Pulse Temperature Respirations Last Period Pulse Oximetry       81 98.3  F (36.8  C) (Oral) 16 (LMP Unknown) 99%        Blood Pressure from Last 3 Encounters:   17 139/73   17 120/73   17 121/62    Weight from Last 3 Encounters:   17 59.1 kg (130 lb 4.8 oz)   17 64.5 kg (142 lb 3.2 oz)   17 58.5 kg (129 lb)              Today, you had the following     No orders found for display         Today's Medication Changes          These changes are accurate as of: 17  3:57 PM.  If you have any questions, ask your nurse or doctor.               These medicines have changed or have updated prescriptions.        Dose/Directions    valACYclovir 500 MG tablet   Commonly known as:  VALTREX   This may have changed:    - when to take this  - reasons to take this   Used for:  Herpes simplex disease        Dose:  500 mg   Take 1 tablet (500 mg) by mouth 2 times daily   Quantity:  12 tablet   Refills:  5                Primary Care Provider Office Phone # Fax #    Carolina Lena Pulliam [phone]       [facility] [address]        Equal Access to Services     BRIE PALOMINO : Liset Medrano, waaxda luqadaha, qaybta kaalmada kaylen orozco idijonatan watkins. So [facility] [phone].    ATENCIÓN: Si habla español, tiene a de oliveira disposición servicios gratuitos de asistencia lingüística. Llame al [phone].    We comply with applicable federal civil rights laws and [place] laws. We do not discriminate on the basis of race, color, national origin, age, disability, sex,  sexual orientation, or gender identity.            Thank you!     Thank you for choosing [facility] INF[facility]ION CENTER  for your care. Our goal is always to provide you with excellent care. Hearing back from our patients is one way we can continue to improve our services. Please take a few minutes to complete the written survey that you may receive in the mail after your visit with us. Thank you!             Your Updated Medication List - Protect others around you: Learn how to safely use, store and throw away your medicines at www.disposemymeds.org.          This list is accurate as of: 11/28/17  3:57 PM.  Always use your most recent med list.                   Brand Name Dispense Instructions for use Diagnosis    acetaminophen 325 MG tablet    TYLENOL    100 tablet    Take 2 tablets (650 mg) by mouth every 4 hours as needed for mild pain or fever (greater than 101 degrees)    Multiple sclerosis exacerbation (H)       AUBAGIO 14 MG tablet   Generic drug:  teriflunomide      Take 1 tablet (14 mg) by mouth daily    MS (multiple sclerosis) (H)       baclofen 10 MG tablet    LIORESAL    90 tablet    Take 2 tablets (20 mg) by mouth 2 times daily    Preop general physical exam, MS (multiple sclerosis) (H)       cholecalciferol 5000 UNITS Caps capsule    vitamin D3     Take 1 capsule (5,000 Units) by mouth daily    MS (multiple sclerosis) (H)       * gabapentin 300 MG capsule    NEURONTIN    90 capsule    Take 2 capsules (600 mg) by mouth every morning    Paresthesia       * gabapentin 300 MG capsule    NEURONTIN    90 capsule    Take 3 capsules (900 mg) by mouth At Bedtime    Paresthesia       LORazepam 1 MG tablet    ATIVAN    60 tablet    Take 1 tablet (1 mg) by mouth nightly as needed for anxiety    Anxiety       multivitamin, therapeutic with minerals Tabs tablet      Take 1 tablet by mouth daily.    Anemia       NICOTROL 10 MG Inhaler   Generic drug:  nicotine      Inhale 1 Cartridge into the lungs  daily as needed for smoking cessation        nitroFURantoin macrocrystal 100 MG capsule    MACRODANTIN     Take 100 mg by mouth daily UTI prophylaxis        omeprazole 40 MG capsule    priLOSEC     Take one capsule by mouth daily with breakfast.        order for DME     1 Units    Equipment being ordered: medical lift chair    MS (multiple sclerosis) (H)       oxybutynin 10 MG 24 hr tablet    DITROPAN-XL    30 tablet    Take 1 tablet (10 mg) by mouth 2 times daily    Neurogenic bladder       polyethylene glycol Packet    MIRALAX/GLYCOLAX    30 packet    Take 17 g by mouth daily    Multiple sclerosis exacerbation (H)       POTASSIUM CHLORIDE PO      Take 99 mg by mouth daily        senna-docusate 8.6-50 MG per tablet    SENOKOT-S;PERICOLACE    100 tablet    Take 2 tablets by mouth 2 times daily    Multiple sclerosis exacerbation (H)       sertraline 100 MG tablet    ZOLOFT    135 tablet    Take 1.5 tablets (150 mg) by mouth daily    Major depression in complete remission (H)       valACYclovir 500 MG tablet    VALTREX    12 tablet    Take 1 tablet (500 mg) by mouth 2 times daily    Herpes simplex disease       * Notice:  This list has 2 medication(s) that are the same as other medications prescribed for you. Read the directions carefully, and ask your doctor or other care provider to review them with you.

## 2017-11-28 NOTE — PATIENT INSTRUCTIONS
Thank you for choosing [facility]. The following information is a summary of your appointment as well as important reminders:      Last dose Tylenol was 0855 am      Care for yourself after your infusions:  1.  Notify your doctor or seek appropriate medical attention as soon as possible if you experience any discomfort or anything that feels out of the ordinary--including swelling in your mouth or throat, trouble breathing, weakness; fast, slow or irregular heartbeat, chest pain or rash.    2.  Please continue all post infusion medications as prescribed by your healthcare provider.  3.  Stay hydrated.  Be sure to drink plenty of water.  4.  Rest.  You may feel tired, so take it easy and don't overexert yourself.  5.  Eat your regular meals and usual portion sizes as tolerated.   6.  Be mindful of infections and ways to help reduce your risk.      We look forward in seeing you on your next appointment here at [facility].  Please don t hesitate to call us at [phone] to reschedule any of your appointments or to speak with one of our registered nurses.  It was a pleasure taking care of you today.    Sincerely,    [facility]  [facility]  [address] [facility] [address] [place] [address]  Phone: [phone]

## 2017-11-29 ENCOUNTER — INFUSION THERAPY VISIT (OUTPATIENT)
Dept: INFUSION THERAPY | Facility: CLINIC | Age: 62
End: 2017-11-29
Attending: PSYCHIATRY & NEUROLOGY
Payer: COMMERCIAL

## 2017-11-29 VITALS
RESPIRATION RATE: 16 BRPM | TEMPERATURE: 98.7 F | DIASTOLIC BLOOD PRESSURE: 82 MMHG | SYSTOLIC BLOOD PRESSURE: 136 MMHG | HEART RATE: 87 BPM

## 2017-11-29 DIAGNOSIS — G35 MS (MULTIPLE SCLEROSIS) (H): Primary | ICD-10-CM

## 2017-11-29 PROCEDURE — 96413 CHEMO IV INFUSION 1 HR: CPT

## 2017-11-29 PROCEDURE — 25000128 H RX IP 250 OP 636: Performed by: PSYCHIATRY & NEUROLOGY

## 2017-11-29 PROCEDURE — 96367 TX/PROPH/DG ADDL SEQ IV INF: CPT

## 2017-11-29 PROCEDURE — 25000132 ZZH RX MED GY IP 250 OP 250 PS 637: Performed by: PSYCHIATRY & NEUROLOGY

## 2017-11-29 PROCEDURE — 96415 CHEMO IV INFUSION ADDL HR: CPT

## 2017-11-29 RX ORDER — ACETAMINOPHEN 500 MG
1000 TABLET ORAL ONCE
Status: COMPLETED | OUTPATIENT
Start: 2017-11-29 | End: 2017-11-29

## 2017-11-29 RX ORDER — DIPHENHYDRAMINE HYDROCHLORIDE 50 MG/ML
25 INJECTION INTRAMUSCULAR; INTRAVENOUS
Status: CANCELLED | OUTPATIENT
Start: 2017-11-29

## 2017-11-29 RX ORDER — IBUPROFEN 200 MG
800 TABLET ORAL EVERY 6 HOURS PRN
Status: CANCELLED | OUTPATIENT
Start: 2017-11-29

## 2017-11-29 RX ORDER — HYDROXYZINE HYDROCHLORIDE 50 MG/1
50 TABLET, FILM COATED ORAL ONCE
Status: COMPLETED | OUTPATIENT
Start: 2017-11-29 | End: 2017-11-29

## 2017-11-29 RX ORDER — EPINEPHRINE 0.3 MG/.3ML
0.3 INJECTION SUBCUTANEOUS
Status: CANCELLED | OUTPATIENT
Start: 2017-11-29

## 2017-11-29 RX ORDER — HYDROXYZINE HYDROCHLORIDE 50 MG/1
50 TABLET, FILM COATED ORAL ONCE
Status: CANCELLED
Start: 2017-11-29 | End: 2017-11-29

## 2017-11-29 RX ORDER — ACETAMINOPHEN 500 MG
1000 TABLET ORAL ONCE
Status: CANCELLED | OUTPATIENT
Start: 2017-11-29

## 2017-11-29 RX ORDER — MEPERIDINE HYDROCHLORIDE 50 MG/1
50 TABLET ORAL ONCE
Status: CANCELLED
Start: 2017-11-29 | End: 2017-11-29

## 2017-11-29 RX ORDER — MEPERIDINE HYDROCHLORIDE 50 MG/1
50 TABLET ORAL EVERY 4 HOURS PRN
Status: CANCELLED
Start: 2017-11-29

## 2017-11-29 RX ORDER — PROMETHAZINE HYDROCHLORIDE 25 MG/ML
25 INJECTION, SOLUTION INTRAMUSCULAR; INTRAVENOUS
Status: CANCELLED
Start: 2017-11-29

## 2017-11-29 RX ORDER — DIPHENHYDRAMINE HCL 25 MG
25 CAPSULE ORAL EVERY 4 HOURS PRN
Status: CANCELLED
Start: 2017-11-29

## 2017-11-29 RX ORDER — HYDROXYZINE HYDROCHLORIDE 50 MG/1
50 TABLET, FILM COATED ORAL
Status: CANCELLED
Start: 2017-11-29

## 2017-11-29 RX ORDER — ACETAMINOPHEN 500 MG
1000 TABLET ORAL EVERY 4 HOURS PRN
Status: CANCELLED | OUTPATIENT
Start: 2017-11-29

## 2017-11-29 RX ADMIN — HYDROXYZINE HYDROCHLORIDE 50 MG: 50 TABLET, FILM COATED ORAL at 08:43

## 2017-11-29 RX ADMIN — RENAGEL 12 MG: 400 TABLET ORAL at 09:48

## 2017-11-29 RX ADMIN — SODIUM CHLORIDE 1000 MG: 0.9 INJECTION, SOLUTION INTRAVENOUS at 08:42

## 2017-11-29 RX ADMIN — ACETAMINOPHEN 1000 MG: 500 TABLET ORAL at 08:42

## 2017-11-29 RX ADMIN — RANITIDINE 150 MG: 150 TABLET, FILM COATED ORAL at 08:44

## 2017-11-29 ASSESSMENT — PAIN SCALES - GENERAL: PAINLEVEL: MODERATE PAIN (4)

## 2017-11-29 NOTE — PROGRESS NOTES
[facility] Infusion Nursing Note:  Marylee Woods presents today for Lemtrada.    Patient seen by provider today: No   present during visit today: Not Applicable.    Note: Patient did not take demerol as pre med because it makes her drowsy.    Intravenous Access:  Peripheral IV placed from 11/27/2017; flushes without difficulty.    Pre infusion Assessment:    1. Is patient authorized and have they received the Lemtrada treatment and infusion reactions patient guide? YES.  2. If ordered, has patient taken pre-medications? NO.  3. Does patient have any of the following signs of infection: fever, cough, sore throat, wounds, UTI symptoms? NO.  4. Is patient taking any oral, IV or topical anti-infective medication? NO.  5. Has patient been in contact with anyone with TB? NO.  6. Has patient traveled outside the country in the last 21 days? NO.  7. Has patient had a flu shot or vaccine in the last 6 weeks? NO  8. Plan~ A. Therapy is appropriate, will proceed with treatment today?                                        YES.      B. Medication will be held today and patient will be re-assessed at a later                      Date: NO.    Lemtrada - REMS requirements:    9. Lemtrada REMS infusion checklist reviewed and proper information documented: YES.  10. Lemtrada REMS infusion checklist completed online daily and submitted after FINAL infusion: YES.  11. Post-infusion instructions reviewed and given to patient: YES.  15. Adverse reactions reported to: N/A.      Post Infusion Assessment:  Patient tolerated infusion without incident.  Patient observed for 120 minutes post Lemtrada per protocol.  Site patent and intact, free from redness, edema or discomfort.  No evidence of extravasations.    Discharge Plan:   Discharge instructions reviewed with: Patient.  Patient and/or family verbalized understanding of discharge instructions and all questions answered.  Copy of AVS reviewed with patient and/or family.  Patient  will return 11/30/2017 for next appointment.  Patient discharged in stable condition accompanied by: self and .  Departure Mode: Wheelchair.    Elly Landis RN

## 2017-11-29 NOTE — MR AVS SNAPSHOT
After Visit Summary   11/29/2017    Marylee Woods    MRN: [id number]           Patient Information     Date Of Birth          1955        Visit Information        Provider Department      11/29/2017 8:00 AM  INFUSION CHAIR 19 [facility] [place]        Today's Diagnoses     MS (multiple sclerosis) (H)    -  1      Care Instructions    Thank you for choosing [facility]. The following information is a summary of your appointment as well as important reminders:      Last dose Tylenol was 0845  Last dose Ibuprofen was N/A  Last dose Benadryl was N/A    Care for yourself after your infusions:  1.  Notify your doctor or seek appropriate medical attention as soon as possible if you experience any discomfort or anything that feels out of the ordinary--including swelling in your mouth or throat, trouble breathing, weakness; fast, slow or irregular heartbeat, chest pain or rash.    2.  Please continue all post infusion medications as prescribed by your healthcare provider.  3.  Stay hydrated.  Be sure to drink plenty of water.  4.  Rest.  You may feel tired, so take it easy and don't overexert yourself.  5.  Eat your regular meals and usual portion sizes as tolerated.   6.  Be mindful of infections and ways to help reduce your risk.      We look forward in seeing you on your next appointment here at [facility].  Please don t hesitate to call us at [phone] to reschedule any of your appointments or to speak with one of our registered nurses.  It was a pleasure taking care of you today.    Sincerely,    [facility]  [facility]  [address] [place] [address]  Phone: [phone]          Follow-ups after your visit        Your next 10 appointments already scheduled     Nov 30, 2017  8:00 AM CST   Level 7 with  INFUSION  "CHAIR 11   [facility] ([facility])    [facility]  [address]  [place] [address] [phone]            Dec 01, 2017  8:00 AM CST   Level 7 with SH INFUSION CHAIR 7   [facility] ([facility])    [facility]  [address]  [place] [id number] [phone]              Who to contact     If you have questions or need follow up information about today's clinic visit or your schedule please contact [facility] directly at [phone].  Normal or non-critical lab and imaging results will be communicated to you by mygolahart, letter or phone within 4 business days after the clinic has received the results. If you do not hear from us within 7 days, please contact the clinic through mygolah[facility] or phone. If you have a critical or abnormal lab result, we will notify you by phone as soon as possible.  Submit refill requests through [facility] or call your pharmacy and they will forward the refill request to us. Please allow 3 business days for your refill to be completed.          Additional Information About Your Visit        [employer] Information     [facility] lets you send messages to your doctor, view your test results, renew your prescriptions, schedule appointments and more. To sign up, go to [email or web address] . Click on \"Log in\" on the left side of the screen, which will take you to the Welcome page. Then click on \"Sign up Now\" on the right side of the page.     You will be asked to enter the access code listed below, as well as some personal information. Please follow the directions to create your username and password.     Your access code is: [email or web address]  Expires: 2018 11:13 AM     Your access code will  in 90 days. If you need help or a new code, please call your [place] clinic or [phone].        Care " EveryWhere ID     This is your Care EveryWhere ID. This could be used by other organizations to access your [facility] medical records  [id number]        Your Vitals Were     Pulse Temperature Respirations Last Period          87 98.7  F (37.1  C) (Oral) 16 (LMP Unknown)         Blood Pressure from Last 3 Encounters:   11/29/17 136/82   11/28/17 139/73   11/27/17 120/73    Weight from Last 3 Encounters:   11/21/17 59.1 kg (130 lb 4.8 oz)   11/05/17 64.5 kg (142 lb 3.2 oz)   08/31/17 58.5 kg (129 lb)              Today, you had the following     No orders found for display         Today's Medication Changes          These changes are accurate as of: 11/29/17  4:02 PM.  If you have any questions, ask your nurse or doctor.               These medicines have changed or have updated prescriptions.        Dose/Directions    valACYclovir 500 MG tablet   Commonly known as:  VALTREX   This may have changed:    - when to take this  - reasons to take this   Used for:  Herpes simplex disease        Dose:  500 mg   Take 1 tablet (500 mg) by mouth 2 times daily   Quantity:  12 tablet   Refills:  5                Primary Care Provider Office Phone # Fax #    Carolina Pulliam [phone]       [facility] [facility] [address]        Equal Access to Services     BRIE PALOMINO AH: Liset Medrano, wavirginia jorge, qaybta kaalmajuan orozco, kaylen watkins. So [facility] [phone].    ATENCIÓN: Si habla español, tiene a de oliveira disposición servicios gratuitos de asistencia lingüística. dee al [phone].    We comply with applicable federal civil rights laws and [place] laws. We do not discriminate on the basis of race, color, national origin, age, disability, sex, sexual orientation, or gender identity.            Thank you!     Thank you for choosing [facility]  for your care. Our goal is always to provide you with excellent care. Hearing  back from our patients is one way we can continue to improve our services. Please take a few minutes to complete the written survey that you may receive in the mail after your visit with us. Thank you!             Your Updated Medication List - Protect others around you: Learn how to safely use, store and throw away your medicines at www.disposemymeds.org.          This list is accurate as of: 11/29/17  4:02 PM.  Always use your most recent med list.                   Brand Name Dispense Instructions for use Diagnosis    acetaminophen 325 MG tablet    TYLENOL    100 tablet    Take 2 tablets (650 mg) by mouth every 4 hours as needed for mild pain or fever (greater than 101 degrees)    Multiple sclerosis exacerbation (H)       AUBAGIO 14 MG tablet   Generic drug:  teriflunomide      Take 1 tablet (14 mg) by mouth daily    MS (multiple sclerosis) (H)       baclofen 10 MG tablet    LIORESAL    90 tablet    Take 2 tablets (20 mg) by mouth 2 times daily    Preop general physical exam, MS (multiple sclerosis) (H)       cholecalciferol 5000 UNITS Caps capsule    vitamin D3     Take 1 capsule (5,000 Units) by mouth daily    MS (multiple sclerosis) (H)       * gabapentin 300 MG capsule    NEURONTIN    90 capsule    Take 2 capsules (600 mg) by mouth every morning    Paresthesia       * gabapentin 300 MG capsule    NEURONTIN    90 capsule    Take 3 capsules (900 mg) by mouth At Bedtime    Paresthesia       LORazepam 1 MG tablet    ATIVAN    60 tablet    Take 1 tablet (1 mg) by mouth nightly as needed for anxiety    Anxiety       multivitamin, therapeutic with minerals Tabs tablet      Take 1 tablet by mouth daily.    Anemia       NICOTROL 10 MG Inhaler   Generic drug:  nicotine      Inhale 1 Cartridge into the lungs daily as needed for smoking cessation        nitroFURantoin macrocrystal 100 MG capsule    MACRODANTIN     Take 100 mg by mouth daily UTI prophylaxis        omeprazole 40 MG capsule    priLOSEC     Take one capsule  by mouth daily with breakfast.        order for DME     1 Units    Equipment being ordered: medical lift chair    MS (multiple sclerosis) (H)       oxybutynin 10 MG 24 hr tablet    DITROPAN-XL    30 tablet    Take 1 tablet (10 mg) by mouth 2 times daily    Neurogenic bladder       polyethylene glycol Packet    MIRALAX/GLYCOLAX    30 packet    Take 17 g by mouth daily    Multiple sclerosis exacerbation (H)       POTASSIUM CHLORIDE PO      Take 99 mg by mouth daily        senna-docusate 8.6-50 MG per tablet    SENOKOT-S;PERICOLACE    100 tablet    Take 2 tablets by mouth 2 times daily    Multiple sclerosis exacerbation (H)       sertraline 100 MG tablet    ZOLOFT    135 tablet    Take 1.5 tablets (150 mg) by mouth daily    Major depression in complete remission (H)       valACYclovir 500 MG tablet    VALTREX    12 tablet    Take 1 tablet (500 mg) by mouth 2 times daily    Herpes simplex disease       * Notice:  This list has 2 medication(s) that are the same as other medications prescribed for you. Read the directions carefully, and ask your doctor or other care provider to review them with you.

## 2017-11-29 NOTE — PATIENT INSTRUCTIONS
Thank you for choosing [facility]. The following information is a summary of your appointment as well as important reminders:      Last dose Tylenol was 0845  Last dose Ibuprofen was N/A  Last dose Benadryl was N/A    Care for yourself after your infusions:  1.  Notify your doctor or seek appropriate medical attention as soon as possible if you experience any discomfort or anything that feels out of the ordinary--including swelling in your mouth or throat, trouble breathing, weakness; fast, slow or irregular heartbeat, chest pain or rash.    2.  Please continue all post infusion medications as prescribed by your healthcare provider.  3.  Stay hydrated.  Be sure to drink plenty of water.  4.  Rest.  You may feel tired, so take it easy and don't overexert yourself.  5.  Eat your regular meals and usual portion sizes as tolerated.   6.  Be mindful of infections and ways to help reduce your risk.      We look forward in seeing you on your next appointment here at [facility].  Please don t hesitate to call us at [phone] to reschedule any of your appointments or to speak with one of our registered nurses.  It was a pleasure taking care of you today.    Sincerely,    [facility]  [facility]  [address]  [place] [address]  Phone: [phone]

## 2017-11-30 ENCOUNTER — INFUSION THERAPY VISIT (OUTPATIENT)
Dept: INFUSION THERAPY | Facility: CLINIC | Age: 62
End: 2017-11-30
Attending: PSYCHIATRY & NEUROLOGY
Payer: COMMERCIAL

## 2017-11-30 ENCOUNTER — TELEPHONE (OUTPATIENT)
Dept: FAMILY MEDICINE | Facility: CLINIC | Age: 62
End: 2017-11-30

## 2017-11-30 VITALS
HEART RATE: 83 BPM | RESPIRATION RATE: 16 BRPM | TEMPERATURE: 98.1 F | OXYGEN SATURATION: 98 % | SYSTOLIC BLOOD PRESSURE: 134 MMHG | DIASTOLIC BLOOD PRESSURE: 69 MMHG

## 2017-11-30 DIAGNOSIS — G35 MS (MULTIPLE SCLEROSIS) (H): Primary | ICD-10-CM

## 2017-11-30 PROCEDURE — 96413 CHEMO IV INFUSION 1 HR: CPT

## 2017-11-30 PROCEDURE — 96367 TX/PROPH/DG ADDL SEQ IV INF: CPT

## 2017-11-30 PROCEDURE — 25000128 H RX IP 250 OP 636: Performed by: PSYCHIATRY & NEUROLOGY

## 2017-11-30 PROCEDURE — 25000132 ZZH RX MED GY IP 250 OP 250 PS 637: Performed by: PSYCHIATRY & NEUROLOGY

## 2017-11-30 PROCEDURE — 96415 CHEMO IV INFUSION ADDL HR: CPT

## 2017-11-30 RX ORDER — IBUPROFEN 200 MG
800 TABLET ORAL EVERY 6 HOURS PRN
Status: CANCELLED | OUTPATIENT
Start: 2017-11-30

## 2017-11-30 RX ORDER — ACETAMINOPHEN 500 MG
1000 TABLET ORAL EVERY 4 HOURS PRN
Status: CANCELLED | OUTPATIENT
Start: 2017-11-30

## 2017-11-30 RX ORDER — ACETAMINOPHEN 500 MG
1000 TABLET ORAL ONCE
Status: COMPLETED | OUTPATIENT
Start: 2017-11-30 | End: 2017-11-30

## 2017-11-30 RX ORDER — HYDROXYZINE HYDROCHLORIDE 50 MG/1
50 TABLET, FILM COATED ORAL ONCE
Status: CANCELLED
Start: 2017-11-30 | End: 2017-11-30

## 2017-11-30 RX ORDER — MEPERIDINE HYDROCHLORIDE 50 MG/1
50 TABLET ORAL EVERY 4 HOURS PRN
Status: CANCELLED
Start: 2017-11-30

## 2017-11-30 RX ORDER — ACETAMINOPHEN 500 MG
1000 TABLET ORAL ONCE
Status: CANCELLED | OUTPATIENT
Start: 2017-11-30

## 2017-11-30 RX ORDER — PROMETHAZINE HYDROCHLORIDE 25 MG/ML
25 INJECTION, SOLUTION INTRAMUSCULAR; INTRAVENOUS
Status: CANCELLED
Start: 2017-11-30

## 2017-11-30 RX ORDER — HYDROXYZINE HYDROCHLORIDE 50 MG/1
50 TABLET, FILM COATED ORAL
Status: CANCELLED
Start: 2017-11-30

## 2017-11-30 RX ORDER — DIPHENHYDRAMINE HYDROCHLORIDE 50 MG/ML
25 INJECTION INTRAMUSCULAR; INTRAVENOUS
Status: CANCELLED | OUTPATIENT
Start: 2017-11-30

## 2017-11-30 RX ORDER — DIPHENHYDRAMINE HCL 25 MG
25 CAPSULE ORAL EVERY 4 HOURS PRN
Status: CANCELLED
Start: 2017-11-30

## 2017-11-30 RX ORDER — MEPERIDINE HYDROCHLORIDE 50 MG/1
50 TABLET ORAL ONCE
Status: CANCELLED
Start: 2017-11-30 | End: 2017-11-30

## 2017-11-30 RX ORDER — EPINEPHRINE 0.3 MG/.3ML
0.3 INJECTION SUBCUTANEOUS
Status: CANCELLED | OUTPATIENT
Start: 2017-11-30

## 2017-11-30 RX ADMIN — RENAGEL 12 MG: 400 TABLET ORAL at 09:54

## 2017-11-30 RX ADMIN — ACETAMINOPHEN 1000 MG: 500 TABLET ORAL at 08:34

## 2017-11-30 RX ADMIN — SODIUM CHLORIDE 500 MG: 0.9 INJECTION, SOLUTION INTRAVENOUS at 08:35

## 2017-11-30 ASSESSMENT — PAIN SCALES - GENERAL: PAINLEVEL: MILD PAIN (3)

## 2017-11-30 NOTE — PATIENT INSTRUCTIONS
Thank you for choosing [facility]. The following information is a summary of your appointment as well as important reminders:      Last dose Tylenol was  0834 am  Care for yourself after your infusions:  1.  Notify your doctor or seek appropriate medical attention as soon as possible if you experience any discomfort or anything that feels out of the ordinary--including swelling in your mouth or throat, trouble breathing, weakness; fast, slow or irregular heartbeat, chest pain or rash.    2.  Please continue all post infusion medications as prescribed by your healthcare provider.  3.  Stay hydrated.  Be sure to drink plenty of water.  4.  Rest.  You may feel tired, so take it easy and don't overexert yourself.  5.  Eat your regular meals and usual portion sizes as tolerated.   6.  Be mindful of infections and ways to help reduce your risk.      We look forward in seeing you on your next appointment here at [facility].  Please don t hesitate to call us at [phone] to reschedule any of your appointments or to speak with one of our registered nurses.  It was a pleasure taking care of you today.    Sincerely,    [facility]  [facility]  [address] [place] [address]  Phone: [phone]

## 2017-11-30 NOTE — TELEPHONE ENCOUNTER
BLOOD PRESSURE CREEPING UP AFTER SOLUMEDROL INFUSION     [facility]     148/100 DID NOT TAKE BLOOD PRESSURE MEDICINE TODAY     ON 3RD DAY OF INFUSION    WONDERED IF WE SHOULD INCREASE DOSAGE OF HYDROCHLORTHIAZIDE     SUGGESTED MAKE FOLLOW UP  APPOINTMENT with AMILCAR CUEVAS MD      LIKELY CAUSE SOLUMEDROL INFUSION 500-1000 MG FOR MULTIPLE SCLEROSIS    TREATMENT     HUONG RODRIGUEZ JR., MD

## 2017-11-30 NOTE — MR AVS SNAPSHOT
After Visit Summary   11/30/2017    Marylee Woods    MRN: [id number]           Patient Information     Date Of Birth          1955        Visit Information        Provider Department      11/30/2017 8:00 AM  INFUSION CHAIR 11 [facility], [place], [facility]        Today's Diagnoses     MS (multiple sclerosis) (H)    -  1      Care Instructions    Thank you for choosing [facility] [place]. The following information is a summary of your appointment as well as important reminders:      Last dose Tylenol was  0834 am  Care for yourself after your infusions:  1.  Notify your doctor or seek appropriate medical attention as soon as possible if you experience any discomfort or anything that feels out of the ordinary--including swelling in your mouth or throat, trouble breathing, weakness; fast, slow or irregular heartbeat, chest pain or rash.    2.  Please continue all post infusion medications as prescribed by your healthcare provider.  3.  Stay hydrated.  Be sure to drink plenty of water.  4.  Rest.  You may feel tired, so take it easy and don't overexert yourself.  5.  Eat your regular meals and usual portion sizes as tolerated.   6.  Be mindful of infections and ways to help reduce your risk.      We look forward in seeing you on your next appointment here at [facility] [place], operated [facility] [place].  Please don t hesitate to call us at [phone] to reschedule any of your appointments or to speak with one of our registered nurses.  It was a pleasure taking care of you today.    Sincerely,    [facility]  [facility]  [address] [place] [address]  Phone: [phone]            Follow-ups after your visit        Your next 10 appointments already scheduled     Dec 01, 2017  8:00 AM CST   Level 7 with  INFUSION CHAIR 7   [facility] [place], operated by [facility]  "([facility])    [facility]  [address]  [place] [address] [phone]              Who to contact     If you have questions or need follow up information about today's clinic visit or your schedule please contact [facility] directly at [phone].  Normal or non-critical lab and imaging results will be communicated to you by MyChart, letter or phone within 4 business days after the clinic has received the results. If you do not hear from us within 7 days, please contact the clinic through Tape TVhart or phone. If you have a critical or abnormal lab result, we will notify you by phone as soon as possible.  Submit refill requests through Clink or call your pharmacy and they will forward the refill request to us. Please allow 3 business days for your refill to be completed.          Additional Information About Your Visit        Tape [id number] Information     Clink lets you send messages to your doctor, view your test results, renew your prescriptions, schedule appointments and more. To sign up, go to [email or web address] . Click on \"Log in\" on the left side of the screen, which will take you to the Welcome page. Then click on \"Sign up Now\" on the right side of the page.     You will be asked to enter the access code listed below, as well as some personal information. Please follow the directions to create your username and password.     Your access code is: [email or web address]  Expires: 2018 11:13 AM     Your access code will  in 90 days. If you need help or a new code, please call your [facility] clinic or [phone].        Care EveryWhere ID     This is your Care EveryWhere ID. This could be used by other organizations to access your [facility] medical records  [id number]        Your Vitals Were     Pulse Temperature Respirations Last Period Pulse Oximetry       83 98.1  F (36.7  C) (Oral) 16 (LMP Unknown) 98%        Blood " Pressure from Last 3 Encounters:   11/30/17 134/69   11/29/17 136/82   11/28/17 139/73    Weight from Last 3 Encounters:   11/21/17 59.1 kg (130 lb 4.8 oz)   11/05/17 64.5 kg (142 lb 3.2 oz)   08/31/17 58.5 kg (129 lb)              Today, you had the following     No orders found for display         Today's Medication Changes          These changes are accurate as of: 11/30/17  3:35 PM.  If you have any questions, ask your nurse or doctor.               These medicines have changed or have updated prescriptions.        Dose/Directions    valACYclovir 500 MG tablet   Commonly known as:  VALTREX   This may have changed:    - when to take this  - reasons to take this   Used for:  Herpes simplex disease        Dose:  500 mg   Take 1 tablet (500 mg) by mouth 2 times daily   Quantity:  12 tablet   Refills:  5                Primary Care Provider Office Phone # Fax #    Carolina Pulliam [phone] [address] [facility] [address]        Equal Access to Services     BRIE PALOMINO : Liset calderon Soanmol, waaxda luqadaha, qaybta kaalmajuan orozco, kaylen palma . So [facility] [phone].    ATENCIÓN: Si habla español, tiene a de oliveira disposición servicios gratuitos de asistencia lingüística. LlThe [facility] [phone].    We comply with applicable federal civil rights laws and [place] laws. We do not discriminate on the basis of race, color, national origin, age, disability, sex, sexual orientation, or gender identity.            Thank you!     Thank you for choosing [facility]  for your care. Our goal is always to provide you with excellent care. Hearing back from our patients is one way we can continue to improve our services. Please take a few minutes to complete the written survey that you may receive in the mail after your visit with us. Thank you!             Your Updated Medication List - Protect others around you: Learn how to  safely use, store and throw away your medicines at www.disposemymeds.org.          This list is accurate as of: 11/30/17  3:35 PM.  Always use your most recent med list.                   Brand Name Dispense Instructions for use Diagnosis    acetaminophen 325 MG tablet    TYLENOL    100 tablet    Take 2 tablets (650 mg) by mouth every 4 hours as needed for mild pain or fever (greater than 101 degrees)    Multiple sclerosis exacerbation (H)       AUBAGIO 14 MG tablet   Generic drug:  teriflunomide      Take 1 tablet (14 mg) by mouth daily    MS (multiple sclerosis) (H)       baclofen 10 MG tablet    LIORESAL    90 tablet    Take 2 tablets (20 mg) by mouth 2 times daily    Preop general physical exam, MS (multiple sclerosis) (H)       cholecalciferol 5000 UNITS Caps capsule    vitamin D3     Take 1 capsule (5,000 Units) by mouth daily    MS (multiple sclerosis) (H)       * gabapentin 300 MG capsule    NEURONTIN    90 capsule    Take 2 capsules (600 mg) by mouth every morning    Paresthesia       * gabapentin 300 MG capsule    NEURONTIN    90 capsule    Take 3 capsules (900 mg) by mouth At Bedtime    Paresthesia       LORazepam 1 MG tablet    ATIVAN    60 tablet    Take 1 tablet (1 mg) by mouth nightly as needed for anxiety    Anxiety       multivitamin, therapeutic with minerals Tabs tablet      Take 1 tablet by mouth daily.    Anemia       NICOTROL 10 MG Inhaler   Generic drug:  nicotine      Inhale 1 Cartridge into the lungs daily as needed for smoking cessation        nitroFURantoin macrocrystal 100 MG capsule    MACRODANTIN     Take 100 mg by mouth daily UTI prophylaxis        omeprazole 40 MG capsule    priLOSEC     Take one capsule by mouth daily with breakfast.        order for DME     1 Units    Equipment being ordered: medical lift chair    MS (multiple sclerosis) (H)       oxybutynin 10 MG 24 hr tablet    DITROPAN-XL    30 tablet    Take 1 tablet (10 mg) by mouth 2 times daily    Neurogenic bladder        polyethylene glycol Packet    MIRALAX/GLYCOLAX    30 packet    Take 17 g by mouth daily    Multiple sclerosis exacerbation (H)       POTASSIUM CHLORIDE PO      Take 99 mg by mouth daily        senna-docusate 8.6-50 MG per tablet    SENOKOT-S;PERICOLACE    100 tablet    Take 2 tablets by mouth 2 times daily    Multiple sclerosis exacerbation (H)       sertraline 100 MG tablet    ZOLOFT    135 tablet    Take 1.5 tablets (150 mg) by mouth daily    Major depression in complete remission (H)       valACYclovir 500 MG tablet    VALTREX    12 tablet    Take 1 tablet (500 mg) by mouth 2 times daily    Herpes simplex disease       * Notice:  This list has 2 medication(s) that are the same as other medications prescribed for you. Read the directions carefully, and ask your doctor or other care provider to review them with you.

## 2017-11-30 NOTE — PROGRESS NOTES
Lemtrada Infusion Nursing Note:  Marylee Woods presents today for Lemtrada #4.    Patient seen by provider today: No   present during visit today: Not Applicable.    Note: No concerns or changes just mild HA again and tired, BP high today and had not taken her BP med yet for the day. She is taking it now and I will consult with . Again refusing Demerol today as it makes her quite tired and snowed and has done okay without it last few days-only took it on day one.    Intravenous Access:  Peripheral IV in place on arrival.  Pre infusion Assessment:  I did speak with Jennifer for  and ok to proceed, but also inform patient's primary MD Dr. Carolina Pulliam.  1. Is patient authorized and have they received the Lemtrada treatment and infusion reactions patient guide? YES.  2. If ordered, has patient taken pre-medications? YES. Took Atarax and Zantac from home supply  3. Does patient have any of the following signs of infection: fever, cough, sore throat, wounds, UTI symptoms? NO.  4. Is patient taking any oral, IV or topical anti-infective medication? YES.took Acyclovir at home this am  5. Has patient been in contact with anyone with TB? NO.  6. Has patient traveled outside the country in the last 21 days? NO.  7. Has patient had a flu shot or vaccine in the last 6 weeks? NO  8. Plan~ A. Therapy is appropriate, will proceed with treatment today?                                        YES.      B. Medication will be held today and patient will be re-assessed at a later                      Date: NO.    Lemtrada - REMS requirements:    9. Lemtrada REMS infusion checklist reviewed and proper information documented: YES.  10. Lemtrada REMS infusion checklist completed online daily and submitted after FINAL infusion: YES.  11. Post-infusion instructions reviewed and given to patient: YES.  15. Adverse reactions reported to: N/A.      Post Infusion Assessment:  Patient tolerated infusion without  incident.  Patient observed for 120 minutes post lemtrada per protocol.  Blood return noted pre and post infusion.  Site patent and intact, free from redness, edema or discomfort.  No evidence of extravasations.    Discharge Plan:   Discharge instructions reviewed with: Patient.  Patient and/or family verbalized understanding of discharge instructions and all questions answered.  Copy of AVS reviewed with patient and/or family.  Patient will return tomorrow for next appointment.  Patient discharged in stable condition accompanied by: .  Departure Mode: Wheelchair.    Maritza Nur RN

## 2017-12-01 ENCOUNTER — INFUSION THERAPY VISIT (OUTPATIENT)
Dept: INFUSION THERAPY | Facility: CLINIC | Age: 62
End: 2017-12-01
Attending: PSYCHIATRY & NEUROLOGY
Payer: COMMERCIAL

## 2017-12-01 VITALS
TEMPERATURE: 98.4 F | RESPIRATION RATE: 16 BRPM | DIASTOLIC BLOOD PRESSURE: 68 MMHG | OXYGEN SATURATION: 100 % | SYSTOLIC BLOOD PRESSURE: 129 MMHG | HEART RATE: 76 BPM

## 2017-12-01 DIAGNOSIS — G35 MS (MULTIPLE SCLEROSIS) (H): Primary | ICD-10-CM

## 2017-12-01 PROCEDURE — 96367 TX/PROPH/DG ADDL SEQ IV INF: CPT

## 2017-12-01 PROCEDURE — 96413 CHEMO IV INFUSION 1 HR: CPT

## 2017-12-01 PROCEDURE — 96415 CHEMO IV INFUSION ADDL HR: CPT

## 2017-12-01 PROCEDURE — 25000128 H RX IP 250 OP 636: Performed by: PSYCHIATRY & NEUROLOGY

## 2017-12-01 PROCEDURE — 25000132 ZZH RX MED GY IP 250 OP 250 PS 637: Performed by: PSYCHIATRY & NEUROLOGY

## 2017-12-01 RX ORDER — IBUPROFEN 200 MG
800 TABLET ORAL EVERY 6 HOURS PRN
Status: CANCELLED | OUTPATIENT
Start: 2017-12-01

## 2017-12-01 RX ORDER — ACETAMINOPHEN 500 MG
1000 TABLET ORAL ONCE
Status: CANCELLED | OUTPATIENT
Start: 2017-12-01

## 2017-12-01 RX ORDER — ACETAMINOPHEN 500 MG
1000 TABLET ORAL EVERY 4 HOURS PRN
Status: CANCELLED | OUTPATIENT
Start: 2017-12-01

## 2017-12-01 RX ORDER — DIPHENHYDRAMINE HYDROCHLORIDE 50 MG/ML
25 INJECTION INTRAMUSCULAR; INTRAVENOUS
Status: CANCELLED | OUTPATIENT
Start: 2017-12-01

## 2017-12-01 RX ORDER — MEPERIDINE HYDROCHLORIDE 50 MG/1
50 TABLET ORAL ONCE
Status: CANCELLED
Start: 2017-12-01 | End: 2017-12-01

## 2017-12-01 RX ORDER — EPINEPHRINE 0.3 MG/.3ML
0.3 INJECTION SUBCUTANEOUS
Status: CANCELLED | OUTPATIENT
Start: 2017-12-01

## 2017-12-01 RX ORDER — ACETAMINOPHEN 500 MG
1000 TABLET ORAL ONCE
Status: COMPLETED | OUTPATIENT
Start: 2017-12-01 | End: 2017-12-01

## 2017-12-01 RX ORDER — PROMETHAZINE HYDROCHLORIDE 25 MG/ML
25 INJECTION, SOLUTION INTRAMUSCULAR; INTRAVENOUS
Status: CANCELLED
Start: 2017-12-01

## 2017-12-01 RX ORDER — MEPERIDINE HYDROCHLORIDE 50 MG/1
50 TABLET ORAL EVERY 4 HOURS PRN
Status: CANCELLED
Start: 2017-12-01

## 2017-12-01 RX ORDER — HYDROXYZINE HYDROCHLORIDE 50 MG/1
50 TABLET, FILM COATED ORAL ONCE
Status: CANCELLED
Start: 2017-12-01 | End: 2017-12-01

## 2017-12-01 RX ORDER — DIPHENHYDRAMINE HCL 25 MG
25 CAPSULE ORAL EVERY 4 HOURS PRN
Status: CANCELLED
Start: 2017-12-01

## 2017-12-01 RX ORDER — HYDROXYZINE HYDROCHLORIDE 50 MG/1
50 TABLET, FILM COATED ORAL
Status: CANCELLED
Start: 2017-12-01

## 2017-12-01 RX ADMIN — RENAGEL 12 MG: 400 TABLET ORAL at 10:02

## 2017-12-01 RX ADMIN — ACETAMINOPHEN 1000 MG: 500 TABLET ORAL at 08:31

## 2017-12-01 RX ADMIN — SODIUM CHLORIDE 500 MG: 0.9 INJECTION, SOLUTION INTRAVENOUS at 08:32

## 2017-12-01 ASSESSMENT — PAIN SCALES - GENERAL: PAINLEVEL: MODERATE PAIN (4)

## 2017-12-01 NOTE — PATIENT INSTRUCTIONS
Thank you for choosing [facility]. The following information is a summary of your appointment as well as important reminders:      Last dose Tylenol was 0831    Care for yourself after your infusions:  1.  Notify your doctor or seek appropriate medical attention as soon as possible if you experience any discomfort or anything that feels out of the ordinary--including swelling in your mouth or throat, trouble breathing, weakness; fast, slow or irregular heartbeat, chest pain or rash.    2.  Please continue all post infusion medications as prescribed by your healthcare provider.  3.  Stay hydrated.  Be sure to drink plenty of water.  4.  Rest.  You may feel tired, so take it easy and don't overexert yourself.  5.  Eat your regular meals and usual portion sizes as tolerated.   6.  Be mindful of infections and ways to help reduce your risk.      We look forward in seeing you on your next appointment here at [facility] [place].  Please don t hesitate to call us at [phone] to reschedule any of your appointments or to speak with one of our registered nurses.  It was a pleasure taking care of you today.    Sincerely,    [facility]  [facility]  [address] [facility] [address] [place] [address]  Phone: [phone]

## 2017-12-01 NOTE — PROGRESS NOTES
"Lemtrada Infusion Nursing Note:  Marylee Woods presents today for Lemtrada #5.    Patient seen by provider today: No   present during visit today: Not Applicable.    Note: No concerns or questions today. Mild BALES. Took home supply of Atarax and Zantac. Refuses Demerol as it \"snows\" her and she has done well all week with Lemtrada.    Intravenous Access:  Peripheral IV placed.    Pre infusion Assessment:    1. Is patient authorized and have they received the Lemtrada treatment and infusion reactions patient guide? YES.  2. If ordered, has patient taken pre-medications? YES.  3. Does patient have any of the following signs of infection: fever, cough, sore throat, wounds, UTI symptoms? NO.  4. Is patient taking any oral, IV or topical anti-infective medication? YES.  5. Has patient been in contact with anyone with TB? NO.  6. Has patient traveled outside the country in the last 21 days? YES.  7. Has patient had a flu shot or vaccine in the last 6 weeks? NO  8. Plan~ A. Therapy is appropriate, will proceed with treatment today?                                        YES.      B. Medication will be held today and patient will be re-assessed at a later                      Date: No    Lemtrada - REMS requirements:    9. Lemtrada REMS infusion checklist reviewed and proper information documented: YES.  10. Lemtrada REMS infusion checklist completed online daily and submitted after FINAL infusion: YES.  11. Post-infusion instructions reviewed and given to patient: YES.  15. Adverse reactions reported to: N/A.      Post Infusion Assessment:  Patient tolerated infusion without incident.  Patient observed for 120 minutes post Lemtrada per protocol.  Blood return noted pre and post infusion.  Site patent and intact, free from redness, edema or discomfort.  No evidence of extravasations.  Access discontinued per protocol.  Patient wears attends several layers and needs help to BR per w/c and transfer belt. Poor weight " bearing.    Discharge Plan:   Discharge instructions reviewed with: Patient.  Patient and/or family verbalized understanding of discharge instructions and all questions answered.  Copy of AVS reviewed with patient and/or family.  Patient will return as needed only til one year Lemtrada due again .  Patient discharged in stable condition accompanied by: .  Departure Mode: Wheelchair.    Maritza Nur RN

## 2017-12-01 NOTE — MR AVS SNAPSHOT
After Visit Summary   12/1/2017    Marylee Woods    MRN: [id number]           Patient Information     Date Of Birth          1955        Visit Information        Provider Department      12/1/2017 8:00 AM  INFUSION CHAIR 7 [facility]        Today's Diagnoses     MS (multiple sclerosis) (H)    -  1      Care Instructions    Thank you for choosing [facility]. The following information is a summary of your appointment as well as important reminders:      Last dose Tylenol was 0831    Care for yourself after your infusions:  1.  Notify your doctor or seek appropriate medical attention as soon as possible if you experience any discomfort or anything that feels out of the ordinary--including swelling in your mouth or throat, trouble breathing, weakness; fast, slow or irregular heartbeat, chest pain or rash.    2.  Please continue all post infusion medications as prescribed by your healthcare provider.  3.  Stay hydrated.  Be sure to drink plenty of water.  4.  Rest.  You may feel tired, so take it easy and don't overexert yourself.  5.  Eat your regular meals and usual portion sizes as tolerated.   6.  Be mindful of infections and ways to help reduce your risk.      We look forward in seeing you on your next appointment here at [facility].  Please don t hesitate to call us at [phone] to reschedule any of your appointments or to speak with one of our registered nurses.  It was a pleasure taking care of you today.    Sincerely,    [facility]  [facility]  [address] [facility] [address] [place] [address]  Phone: [phone]            Follow-ups after your visit        Who to contact     If you have questions or need follow up information about today's clinic visit or your schedule please contact [facility]  [facility] directly at [phone].  Normal or non-critical lab and imaging results will be communicated to you by MyChart, letter or phone within 4 business days after the clinic has received the results. If you do not hear from us within 7 days, please contact the clinic through [employer]hart or phone. If you have a critical or abnormal lab result, we will notify you by phone as soon as possible.  Submit refill requests through TE2 or call your pharmacy and they will forward the refill request to us. Please allow 3 business days for your refill to be completed.          Additional Information About Your Visit        [employer] CompanyharDrew[employer] Information     TE2 lets you send messages to your doctor, view your test results, renew your prescriptions, schedule appointments and more. To sign up, go to [email or web address] . Click on \"Log in\" on the left side of the screen, which will take you to the Welcome page. Then click on \"Sign up Now\" on the right side of the page.     You will be asked to enter the access code listed below, as well as some personal information. Please follow the directions to create your username and password.     Your access code is: [email or web address]  Expires: 2018 11:13 AM     Your access code will  in 90 days. If you need help or a new code, please call your [place] clinic or [phone].        Care EveryWhere ID     This is your Care EveryWhere ID. This could be used by other organizations to access your [facility] medical records  [id number]        Your Vitals Were     Pulse Temperature Respirations Last Period Pulse Oximetry       76 98.4  F (36.9  C) (Oral) 16 (LMP Unknown) 100%        Blood Pressure from Last 3 Encounters:   17 129/68   17 134/69   17 136/82    Weight from Last 3 Encounters:   17 59.1 kg (130 lb 4.8 oz)   17 64.5 kg (142 lb 3.2 oz)   17 58.5 kg (129 lb)              Today, you had the following     No orders found for display       "   Today's Medication Changes          These changes are accurate as of: 12/1/17  4:21 PM.  If you have any questions, ask your nurse or doctor.               These medicines have changed or have updated prescriptions.        Dose/Directions    valACYclovir 500 MG tablet   Commonly known as:  VALTREX   This may have changed:    - when to take this  - reasons to take this   Used for:  Herpes simplex disease        Dose:  500 mg   Take 1 tablet (500 mg) by mouth 2 times daily   Quantity:  12 tablet   Refills:  5                Primary Care Provider Office Phone # Fax #    Carolinaelías Pulliam [phone] [address] [facility] [address]        Equal Access to Services     [facility] CenterGHASSAN : Liset Medrano, pete jorge, gary orozco, kaylen palma . So [facility] [phone].    ATENCIÓN: Si habla español, tiene a de oliveira disposición servicios gratuitos de asistencia lingüística. LlPremier Health [phone].    We comply with applicable federal civil rights laws and [place] laws. We do not discriminate on the basis of race, color, national origin, age, disability, sex, sexual orientation, or gender identity.            Thank you!     Thank you for choosing [facility] CL[facility]  for your care. Our goal is always to provide you with excellent care. Hearing back from our patients is one way we can continue to improve our services. Please take a few minutes to complete the written survey that you may receive in the mail after your visit with us. Thank you!             Your Updated Medication List - Protect others around you: Learn how to safely use, store and throw away your medicines at www.disposemymeds.org.          This list is accurate as of: 12/1/17  4:21 PM.  Always use your most recent med list.                   Brand Name Dispense Instructions for use Diagnosis    acetaminophen 325 MG tablet    TYLENOL    100 tablet    Take 2  tablets (650 mg) by mouth every 4 hours as needed for mild pain or fever (greater than 101 degrees)    Multiple sclerosis exacerbation (H)       AUBAGIO 14 MG tablet   Generic drug:  teriflunomide      Take 1 tablet (14 mg) by mouth daily    MS (multiple sclerosis) (H)       baclofen 10 MG tablet    LIORESAL    90 tablet    Take 2 tablets (20 mg) by mouth 2 times daily    Preop general physical exam, MS (multiple sclerosis) (H)       cholecalciferol 5000 UNITS Caps capsule    vitamin D3     Take 1 capsule (5,000 Units) by mouth daily    MS (multiple sclerosis) (H)       * gabapentin 300 MG capsule    NEURONTIN    90 capsule    Take 2 capsules (600 mg) by mouth every morning    Paresthesia       * gabapentin 300 MG capsule    NEURONTIN    90 capsule    Take 3 capsules (900 mg) by mouth At Bedtime    Paresthesia       LORazepam 1 MG tablet    ATIVAN    60 tablet    Take 1 tablet (1 mg) by mouth nightly as needed for anxiety    Anxiety       multivitamin, therapeutic with minerals Tabs tablet      Take 1 tablet by mouth daily.    Anemia       NICOTROL 10 MG Inhaler   Generic drug:  nicotine      Inhale 1 Cartridge into the lungs daily as needed for smoking cessation        nitroFURantoin macrocrystal 100 MG capsule    MACRODANTIN     Take 100 mg by mouth daily UTI prophylaxis        omeprazole 40 MG capsule    priLOSEC     Take one capsule by mouth daily with breakfast.        order for DME     1 Units    Equipment being ordered: medical lift chair    MS (multiple sclerosis) (H)       oxybutynin 10 MG 24 hr tablet    DITROPAN-XL    30 tablet    Take 1 tablet (10 mg) by mouth 2 times daily    Neurogenic bladder       polyethylene glycol Packet    MIRALAX/GLYCOLAX    30 packet    Take 17 g by mouth daily    Multiple sclerosis exacerbation (H)       POTASSIUM CHLORIDE PO      Take 99 mg by mouth daily        senna-docusate 8.6-50 MG per tablet    SENOKOT-S;PERICOLACE    100 tablet    Take 2 tablets by mouth 2 times daily     Multiple sclerosis exacerbation (H)       sertraline 100 MG tablet    ZOLOFT    135 tablet    Take 1.5 tablets (150 mg) by mouth daily    Major depression in complete remission (H)       valACYclovir 500 MG tablet    VALTREX    12 tablet    Take 1 tablet (500 mg) by mouth 2 times daily    Herpes simplex disease       * Notice:  This list has 2 medication(s) that are the same as other medications prescribed for you. Read the directions carefully, and ask your doctor or other care provider to review them with you.

## 2017-12-04 ENCOUNTER — TELEPHONE (OUTPATIENT)
Dept: FAMILY MEDICINE | Facility: CLINIC | Age: 62
End: 2017-12-04

## 2017-12-04 NOTE — TELEPHONE ENCOUNTER
Patient cannot go outside today for P T so will need to delay P T by one day.  Verbal ok given on behallf of Carolina Pulliam MD .

## 2017-12-05 ENCOUNTER — CARE COORDINATION (OUTPATIENT)
Dept: CARE COORDINATION | Facility: CLINIC | Age: 62
End: 2017-12-05

## 2017-12-05 NOTE — PROGRESS NOTES
Clinic Care Coordination Contact  [facility]/Voicemail    Referral Source: CTS    Clinical Data: Care Coordinator Outreach    Outreach to patient attempted.   Left message on voicemail with call back information and requested return call.    Plan: Clinic care coordinator will continue to outreach to patient. Patient needs to be seen by PCP for follow as well.     Aminata Castillo RN, CCM - Care Coordinator     12/5/2017    10:21 AM  [phone]

## 2017-12-06 ENCOUNTER — TELEPHONE (OUTPATIENT)
Dept: FAMILY MEDICINE | Facility: CLINIC | Age: 62
End: 2017-12-06

## 2017-12-06 ENCOUNTER — CARE COORDINATION (OUTPATIENT)
Dept: CARE COORDINATION | Facility: CLINIC | Age: 62
End: 2017-12-06

## 2017-12-06 NOTE — TELEPHONE ENCOUNTER
Thanks, noted, hopefully she'll come in post hospital soon and we can adjust med list.  Dr. Carolina Pulliam MD/[facility]

## 2017-12-06 NOTE — PROGRESS NOTES
Clinic Care Coordination Contact      Patient returned clinic care coordinator's call.     She states that she is having phone problems, the ringer on her phone is not working so she does not know when anyone is calling.  She states she is aware that she needs to make a PCP appointment and is hoping to do this in the next couple of days. She states she needs to check with her spouses schedule at work. He is her  and works full time. She does not want him to be called while working.     Patient states since finishing her Lemtr[place] infusions she has been experiencing some fevers, headaches and sore throat.  She states she called her neurology office regarding these symptoms and they instructed her to see her primary care MD.    Patient will make an appointment with PCP when her  can bring her. Clinic care coordinator will continue to follow.    Aminata Castillo RN, [facility] - Care Coordinator     12/6/2017    1:44 PM  [phone]

## 2017-12-06 NOTE — TELEPHONE ENCOUNTER
Dr Pulliam and Aminata,     I spoke with [facility] PT Malorie Thomson and she had concerns about Malorie Almendarez's  med list.  Do we know that she is off HCTZ as her BP was low in hospital?  She also has concerns about her taking 500 mg tylenol PRN.  The original order says tylenol 650 mg every 4 hours.  Also she is taking  potassium gluconate and her med list says potassium chloride.  She does not know what dose she is on. She also had questions on MS meds but I referred her to Neurology for those.   I gave verbal ok for home care orders below.  She said the patient knows she is supposed to schedule hospital f/u with Dr Pulliam- no appt yet.  She said the patient is very hard to get ahold of.  I left a message for the patient but I see you already left one Aminata.    Maria M Waters RN- Triage [employer]            [facility] Malorie Thomson-PT calling she did assessment yesterday. She was in the hospital for exaceration  Need orders for  2x/week for 2 weeks   1/x for 2 weeks    OT eval    Nurse eval    JUSTIN eval    HHA once per week for 4 weeks

## 2017-12-07 ENCOUNTER — TELEPHONE (OUTPATIENT)
Dept: FAMILY MEDICINE | Facility: CLINIC | Age: 62
End: 2017-12-07

## 2017-12-07 DIAGNOSIS — R30.0 DYSURIA: ICD-10-CM

## 2017-12-07 DIAGNOSIS — R82.90 NONSPECIFIC FINDING ON EXAMINATION OF URINE: Primary | ICD-10-CM

## 2017-12-07 DIAGNOSIS — N30.00 ACUTE CYSTITIS WITHOUT HEMATURIA: ICD-10-CM

## 2017-12-07 LAB
ALBUMIN UR-MCNC: 30 MG/DL
APPEARANCE UR: ABNORMAL
BACTERIA #/AREA URNS HPF: ABNORMAL /HPF
BILIRUB UR QL STRIP: NEGATIVE
COLOR UR AUTO: YELLOW
GLUCOSE UR STRIP-MCNC: NEGATIVE MG/DL
HGB UR QL STRIP: ABNORMAL
KETONES UR STRIP-MCNC: NEGATIVE MG/DL
LEUKOCYTE ESTERASE UR QL STRIP: ABNORMAL
NITRATE UR QL: POSITIVE
NON-SQ EPI CELLS #/AREA URNS LPF: ABNORMAL /LPF
PH UR STRIP: 6.5 PH (ref 5–7)
RBC #/AREA URNS AUTO: ABNORMAL /HPF
SOURCE: ABNORMAL
SP GR UR STRIP: 1.02 (ref 1–1.03)
UROBILINOGEN UR STRIP-ACNC: 0.2 EU/DL (ref 0.2–1)
WBC #/AREA URNS AUTO: >100 /HPF

## 2017-12-07 PROCEDURE — 81001 URINALYSIS AUTO W/SCOPE: CPT | Performed by: FAMILY MEDICINE

## 2017-12-07 PROCEDURE — 87088 URINE BACTERIA CULTURE: CPT | Performed by: FAMILY MEDICINE

## 2017-12-07 PROCEDURE — 87086 URINE CULTURE/COLONY COUNT: CPT | Performed by: FAMILY MEDICINE

## 2017-12-07 PROCEDURE — 87186 SC STD MICRODIL/AGAR DIL: CPT | Performed by: FAMILY MEDICINE

## 2017-12-07 NOTE — TELEPHONE ENCOUNTER
Verbal okay for skn visits 1x week x 3 week. Will send in order for signature. U/A was brought into lab today for dysuria.

## 2017-12-08 RX ORDER — SULFAMETHOXAZOLE/TRIMETHOPRIM 800-160 MG
1 TABLET ORAL 2 TIMES DAILY
Qty: 14 TABLET | Refills: 0 | Status: SHIPPED | OUTPATIENT
Start: 2017-12-08 | End: 2017-12-19

## 2017-12-08 NOTE — TELEPHONE ENCOUNTER
Okay, thanks.  There is a standing order for Urinalysis.  Was this just an FYI, or is there a follow up I need to do?  Thanks!  Dr. Carolina Pulliam MD/[facility]

## 2017-12-08 NOTE — PROGRESS NOTES
Patient has Urinary Tract Infection - can you call and let her know?  I'll send antibiotics for her.  Dr. Carolina Pulliam MD/[facility]

## 2017-12-09 LAB
BACTERIA SPEC CULT: ABNORMAL
SPECIMEN SOURCE: ABNORMAL

## 2017-12-11 ENCOUNTER — TELEPHONE (OUTPATIENT)
Dept: ONCOLOGY | Facility: CLINIC | Age: 62
End: 2017-12-11

## 2017-12-11 ENCOUNTER — TELEPHONE (OUTPATIENT)
Dept: FAMILY MEDICINE | Facility: CLINIC | Age: 62
End: 2017-12-11

## 2017-12-11 DIAGNOSIS — D69.6 THROMBOCYTOPENIA (H): Primary | ICD-10-CM

## 2017-12-11 NOTE — TELEPHONE ENCOUNTER
I called and spoke with [facility] and placed an order in Epic for weekly CBC x 4 and after that CBC every 14 days for 8 weeks.    Patient is aware and will be expecting a call for a visit and lab draw. Prerna Roberts

## 2017-12-11 NOTE — TELEPHONE ENCOUNTER
Paola MORENO called from [facility] requesting OT 1x/wk for 1 wk, 2x/wk for 2 wks, 1x/wk for 2 weeks. Ricky bates given.

## 2017-12-12 ENCOUNTER — OFFICE VISIT (OUTPATIENT)
Dept: FAMILY MEDICINE | Facility: CLINIC | Age: 62
End: 2017-12-12
Payer: COMMERCIAL

## 2017-12-12 ENCOUNTER — TELEPHONE (OUTPATIENT)
Dept: FAMILY MEDICINE | Facility: CLINIC | Age: 62
End: 2017-12-12

## 2017-12-12 VITALS
RESPIRATION RATE: 16 BRPM | BODY MASS INDEX: 19.77 KG/M2 | TEMPERATURE: 98.5 F | OXYGEN SATURATION: 95 % | SYSTOLIC BLOOD PRESSURE: 110 MMHG | WEIGHT: 130 LBS | DIASTOLIC BLOOD PRESSURE: 72 MMHG | HEART RATE: 83 BPM

## 2017-12-12 DIAGNOSIS — N39.0 URINARY TRACT INFECTION WITHOUT HEMATURIA, SITE UNSPECIFIED: Primary | ICD-10-CM

## 2017-12-12 DIAGNOSIS — I10 ESSENTIAL HYPERTENSION, BENIGN: ICD-10-CM

## 2017-12-12 PROCEDURE — 99214 OFFICE O/P EST MOD 30 MIN: CPT | Performed by: FAMILY MEDICINE

## 2017-12-12 RX ORDER — HYDROCHLOROTHIAZIDE 12.5 MG/1
12.5 TABLET ORAL DAILY
Qty: 90 TABLET | Refills: 3 | Status: SHIPPED | OUTPATIENT
Start: 2017-12-12 | End: 2017-12-12

## 2017-12-12 RX ORDER — HYDROCHLOROTHIAZIDE 12.5 MG/1
12.5 TABLET ORAL DAILY
Qty: 90 TABLET | Refills: 3 | Status: ON HOLD | OUTPATIENT
Start: 2017-12-12 | End: 2018-11-06

## 2017-12-12 NOTE — PROGRESS NOTES
SUBJECTIVE:   Marylee Woods is a 62 year old female who presents to clinic today for the following health issues:          Hospital Follow-up Visit:    Hospital/Nursing Home/IP Rehab Facility: [facility] started at [place]  Date of Admission: 10/31/2017  Date of Discharge: 11/25/82017, out of rehab  Reason(s) for Admission: Strep infection            Problems taking medications regularly:  None       Medication changes since discharge: None       Problems adhering to non-medication therapy:  None    Summary of hospitalization:  [facility] discharge summary reviewed  Diagnostic Tests/Treatments reviewed.  Follow up needed: check bp meds  Other Healthcare Providers Involved in Patient s Care:         None  Update since discharge: improved.     Post Discharge Medication Reconciliation: discharge medications reconciled and changed, per note/orders (see AVS).  Plan of care communicated with patient            Problem list and histories reviewed & adjusted, as indicated.  Additional history: as documented    BP Readings from Last 3 Encounters:   12/12/17 110/72   12/01/17 129/68   11/30/17 134/69    Wt Readings from Last 3 Encounters:   12/12/17 130 lb (59 kg)   11/21/17 130 lb 4.8 oz (59.1 kg)   11/05/17 142 lb 3.2 oz (64.5 kg)                      Reviewed and updated as needed this visit by clinical staff       Reviewed and updated as needed this visit by Provider         ROS:  Constitutional, HEENT, cardiovascular, pulmonary, gi and gu systems are negative, except as otherwise noted.      OBJECTIVE:   /72  Pulse 83  Temp 98.5  F (36.9  C)  Resp 16  Wt 130 lb (59 kg)  LMP  (LMP Unknown)  SpO2 95%  BMI 19.77 kg/m2  Body mass index is 19.77 kg/(m^2).  GENERAL: wheelchair bound, alert and in NAD.  Voice is slightly hoarse.  Dry mouth.  NECK: no adenopathy, no asymmetry, masses, or scars   RESP: lungs clear to auscultation - no rales, rhonchi or wheezes  CV: regular rate and  rhythm, normal S1 S2, no S3 or S4, no murmur, click or rub, no peripheral edema and peripheral pulses strong  ABDOMEN: soft, nontender, no hepatosplenomegaly, no masses and bowel sounds normal  MS: no gross musculoskeletal defects noted, no edema.  AFOs on.    Diagnostic Test Results:  Results for orders placed or performed in visit on 12/07/17   **UA reflex to Microscopic FUTURE anytime   Result Value Ref Range    Color Urine Yellow     Appearance Urine Cloudy     Glucose Urine Negative NEG^Negative mg/dL    Bilirubin Urine Negative NEG^Negative    Ketones Urine Negative NEG^Negative mg/dL    Specific Gravity Urine 1.020 1.003 - 1.035    Blood Urine Moderate (A) NEG^Negative    pH Urine 6.5 5.0 - 7.0 pH    Protein Albumin Urine 30 (A) NEG^Negative mg/dL    Urobilinogen Urine 0.2 0.2 - 1.0 EU/dL    Nitrite Urine Positive (A) NEG^Negative    Leukocyte Esterase Urine Large (A) NEG^Negative    Source Midstream Urine    Urine Microscopic   Result Value Ref Range    WBC Urine >100 (A) OTO2^O - 2 /HPF    RBC Urine 10-25 (A) OTO2^O - 2 /HPF    Squamous Epithelial /LPF Urine Few FEW^Few /LPF    Bacteria Urine Many (A) NEG^Negative /HPF   Urine Culture Aerobic Bacterial   Result Value Ref Range    Specimen Description Midstream Urine     Culture Micro >100,000 colonies/mL  Klebsiella pneumoniae   (A)        Susceptibility    Klebsiella pneumoniae - VICKI     AMPICILLIN >=32 Resistant ug/mL     CEFAZOLIN* <=4 Sensitive ug/mL      * Cefazolin VICKI breakpoints are for the treatment of uncomplicated urinary tract infections.  For the treatment of systemic infections, please contact the laboratory for additional testing.     CEFOXITIN 8 Sensitive ug/mL     CEFTAZIDIME <=1 Sensitive ug/mL     CEFTRIAXONE <=1 Sensitive ug/mL     CIPROFLOXACIN 1 Sensitive ug/mL     GENTAMICIN <=1 Sensitive ug/mL     LEVOFLOXACIN 2 Sensitive ug/mL     NITROFURANTOIN 256 Resistant ug/mL     TOBRAMYCIN <=1 Sensitive ug/mL     Trimethoprim/Sulfa <=1/19  Sensitive ug/mL     AMPICILLIN/SULBACTAM 8 Sensitive ug/mL     Piperacillin/Tazo 8 Sensitive ug/mL     CEFEPIME <=1 Sensitive ug/mL       ASSESSMENT/PLAN:     Marylee was seen today for hospital f/u.    Diagnoses and all orders for this visit:    Urinary tract infection without hematuria, site unspecified    Essential hypertension, benign  -     Discontinue: hydrochlorothiazide 12.5 MG TABS tablet; Take 1 tablet (12.5 mg) by mouth daily  -     hydrochlorothiazide 12.5 MG TABS tablet; Take 1 tablet (12.5 mg) by mouth daily        Patient Instructions   For your blood pressure:   Don't take the hydrochlorothiazide if your blood pressure is UNDER 120/80.     Take the hydrochlorothiazide if your blood pressure is OVER 120/80.     Don't worry unless it is UNDER 90/50 or OVER 180/110.          Carolina Pulliam MD  [facility]

## 2017-12-12 NOTE — NURSING NOTE
"Chief Complaint   Patient presents with     Hospital F/U       Initial /72  Pulse 83  Temp 98.5  F (36.9  C)  Resp 16  Wt 130 lb (59 kg)  LMP  (LMP Unknown)  SpO2 95%  BMI 19.77 kg/m2 Estimated body mass index is 19.77 kg/(m^2) as calculated from the following:    Height as of 8/4/17: 5' 8\" (1.727 m).    Weight as of this encounter: 130 lb (59 kg).  Medication Reconciliation: complete   S Caty CMA      "

## 2017-12-12 NOTE — PATIENT INSTRUCTIONS
For your blood pressure:   Don't take the hydrochlorothiazide if your blood pressure is UNDER 120/80.     Take the hydrochlorothiazide if your blood pressure is OVER 120/80.     Don't worry unless it is UNDER 90/50 or OVER 180/110.

## 2017-12-12 NOTE — TELEPHONE ENCOUNTER
Our goal is to have forms completed within 72 hours, however some forms may require a visit or additional information.    What clinic location was the form placed at [facility] or [place].?     Who is the form from? [facility] - SN  Where did the form come from? Faxed to clinic   The form was placed in the inbox of Carolina Pulliam MD      Please fax to [phone]    Additional comments: begin date 12/7/17    Call take on 12/12/2017 at 1:44 PM by Zahida Pulliam

## 2017-12-12 NOTE — MR AVS SNAPSHOT
"              After Visit Summary   12/12/2017    Marylee Woods    MRN: [id number]           Patient Information     Date Of Birth          1955        Visit Information        Provider Department      12/12/2017 4:40 PM Carolina Pulliam MD [facility]        Today's Diagnoses     Urinary tract infection without hematuria, site unspecified    -  1    Essential hypertension, benign          Care Instructions    For your blood pressure:   Don't take the hydrochlorothiazide if your blood pressure is UNDER 120/80.     Take the hydrochlorothiazide if your blood pressure is OVER 120/80.     Don't worry unless it is UNDER 90/50 or OVER 180/110.              Follow-ups after your visit        Who to contact     If you have questions or need follow up information about today's clinic visit or your schedule please contact [facility] directly at [phone].  Normal or non-critical lab and imaging results will be communicated to you by MyChart, letter or phone within 4 business days after the clinic has received the results. If you do not hear from us within 7 days, please contact the clinic through MyChart or phone. If you have a critical or abnormal lab result, we will notify you by phone as soon as possible.  Submit refill requests through [employer] or call your pharmacy and they will forward the refill request to us. Please allow 3 business days for your refill to be completed.          Additional Information About Your Visit        MyChart Information     [employer] lets you send messages to your doctor, view your test results, renew your prescriptions, schedule appointments and more. To sign up, go to [email or web address] . Click on \"Log in\" on the left side of the screen, which will take you to the Welcome page. Then click on \"Sign up Now\" on the right side of the page.     You will be asked to enter the access code listed below, as well as " some personal information. Please follow the directions to create your username and password.     Your access code is: [email or web address]  Expires: 2018 11:13 AM     Your access code will  in 90 days. If you need help or a new code, please call your [place] clinic or [phone].        Care EveryWhere ID     This is your Care EveryWhere ID. This could be used by other organizations to access your [facility] medical records  [id number]        Your Vitals Were     Pulse Temperature Respirations Last Period Pulse Oximetry BMI (Body Mass Index)    83 98.5  F (36.9  C) 16 (LMP Unknown) 95% 19.77 kg/m2       Blood Pressure from Last 3 Encounters:   17 110/72   17 129/68   17 134/69    Weight from Last 3 Encounters:   17 130 lb (59 kg)   17 130 lb 4.8 oz (59.1 kg)   17 142 lb 3.2 oz (64.5 kg)              Today, you had the following     No orders found for display         Today's Medication Changes          These changes are accurate as of: 17 11:59 PM.  If you have any questions, ask your nurse or doctor.               Start taking these medicines.        Dose/Directions    hydrochlorothiazide 12.5 MG Tabs tablet   Used for:  Essential hypertension, benign   Started by:  Carolina Pulliam MD        Dose:  12.5 mg   Take 1 tablet (12.5 mg) by mouth daily   Quantity:  90 tablet   Refills:  3         These medicines have changed or have updated prescriptions.        Dose/Directions    valACYclovir 500 MG tablet   Commonly known as:  VALTREX   This may have changed:    - when to take this  - reasons to take this   Used for:  Herpes simplex disease        Dose:  500 mg   Take 1 tablet (500 mg) by mouth 2 times daily   Quantity:  12 tablet   Refills:  5            Where to get your medicines      These medications were sent to [facility] [address] AT [address] [address]     Phone:  [phone]      hydrochlorothiazide 12.5 MG Tabs tablet                Primary Care Provider Office Phone # Fax #    Carolina Lena Pulliam [phone] [address] [facility] [address]        Equal Access to Services     BRIE PALOMINO : Hadii aad ku hadjimmyo Solizbethali, waaxda luqadaha, qaybta kaalmada adeegyada, kaylen paultonja houada waldron minerva watkins. So [facility] [phone].    ATENCIÓN: Si habla español, tiene a de oliveira disposición servicios gratuitos de asistencia lingüística. Llame al [phone].    We comply with applicable federal civil rights laws and [place] laws. We do not discriminate on the basis of race, color, national origin, age, disability, sex, sexual orientation, or gender identity.            Thank you!     Thank you for choosing [facility]  for your care. Our goal is always to provide you with excellent care. Hearing back from our patients is one way we can continue to improve our services. Please take a few minutes to complete the written survey that you may receive in the mail after your visit with us. Thank you!             Your Updated Medication List - Protect others around you: Learn how to safely use, store and throw away your medicines at www.disposemymeds.org.          This list is accurate as of: 12/12/17 11:59 PM.  Always use your most recent med list.                   Brand Name Dispense Instructions for use Diagnosis    acetaminophen 325 MG tablet    TYLENOL    100 tablet    Take 2 tablets (650 mg) by mouth every 4 hours as needed for mild pain or fever (greater than 101 degrees)    Multiple sclerosis exacerbation (H)       baclofen 10 MG tablet    LIORESAL    90 tablet    Take 2 tablets (20 mg) by mouth 2 times daily    Preop general physical exam, MS (multiple sclerosis) (H)       cholecalciferol 5000 UNITS Caps capsule    vitamin D3     Take 1 capsule (5,000 Units) by mouth daily    MS (multiple sclerosis) (H)       * gabapentin 300 MG  capsule    NEURONTIN    90 capsule    Take 2 capsules (600 mg) by mouth every morning    Paresthesia       * gabapentin 300 MG capsule    NEURONTIN    90 capsule    Take 3 capsules (900 mg) by mouth At Bedtime    Paresthesia       hydrochlorothiazide 12.5 MG Tabs tablet     90 tablet    Take 1 tablet (12.5 mg) by mouth daily    Essential hypertension, benign       LORazepam 1 MG tablet    ATIVAN    60 tablet    Take 1 tablet (1 mg) by mouth nightly as needed for anxiety    Anxiety       multivitamin, therapeutic with minerals Tabs tablet      Take 1 tablet by mouth daily.    Anemia       NICOTROL 10 MG Inhaler   Generic drug:  nicotine      Inhale 1 Cartridge into the lungs daily as needed for smoking cessation        nitroFURantoin macrocrystal 100 MG capsule    MACRODANTIN     Take 100 mg by mouth daily UTI prophylaxis        omeprazole 40 MG capsule    priLOSEC     Take one capsule by mouth daily with breakfast.        order for DME     1 Units    Equipment being ordered: medical lift chair    MS (multiple sclerosis) (H)       oxybutynin 10 MG 24 hr tablet    DITROPAN-XL    30 tablet    Take 1 tablet (10 mg) by mouth 2 times daily    Neurogenic bladder       polyethylene glycol Packet    MIRALAX/GLYCOLAX    30 packet    Take 17 g by mouth daily    Multiple sclerosis exacerbation (H)       POTASSIUM CHLORIDE PO      Take 99 mg by mouth daily        senna-docusate 8.6-50 MG per tablet    SENOKOT-S;PERICOLACE    100 tablet    Take 2 tablets by mouth 2 times daily    Multiple sclerosis exacerbation (H)       sertraline 100 MG tablet    ZOLOFT    135 tablet    Take 1.5 tablets (150 mg) by mouth daily    Major depression in complete remission (H)       sulfamethoxazole-trimethoprim 800-160 MG per tablet    BACTRIM DS/SEPTRA DS    14 tablet    Take 1 tablet by mouth 2 times daily    Acute cystitis without hematuria       valACYclovir 500 MG tablet    VALTREX    12 tablet    Take 1 tablet (500 mg) by mouth 2 times daily     Herpes simplex disease       * Notice:  This list has 2 medication(s) that are the same as other medications prescribed for you. Read the directions carefully, and ask your doctor or other care provider to review them with you.

## 2017-12-15 ENCOUNTER — TELEPHONE (OUTPATIENT)
Dept: FAMILY MEDICINE | Facility: CLINIC | Age: 62
End: 2017-12-15

## 2017-12-15 ENCOUNTER — CARE COORDINATION (OUTPATIENT)
Dept: CARE COORDINATION | Facility: CLINIC | Age: 62
End: 2017-12-15

## 2017-12-15 PROBLEM — G35 MULTIPLE SCLEROSIS (H): Status: RESOLVED | Noted: 2017-11-01 | Resolved: 2017-12-15

## 2017-12-15 NOTE — TELEPHONE ENCOUNTER
Our goal is to have forms completed within 72 hours, however some forms may require a visit or additional information.    What clinic location was the form placed at [facility] or [place].?     Who is the form from? [facility] - OT  Where did the form come from? Faxed to clinic   The form was placed in the inbox of Carolina Pulliam M.D.    Please fax to [phone]    Additional comments: Begin date 12/10/17    Call take on 12/15/2017 at 3:13 PM by Zahida Pulliam

## 2017-12-15 NOTE — PROGRESS NOTES
Clinic Care Coordination Contact    Situation: Patient chart reviewed by care coordinator.    Background: MS. Patient has had difficulty maintaining blood pressure.    Assessment: Patient was seen by PCP on 12/12/17. She was instructed to;            Don't take the hydrochlorothiazide if your blood pressure is UNDER 120/80.                           Take the hydrochlorothiazide if your blood pressure is OVER 120/80.                           Don't worry unless it is UNDER 90/50 or OVER 180/110.    Plan/Recommendations: Clinic care coordinator will continue to follow. Patient will participate with home care services.      Aminata Castillo RN, CCM - Care Coordinator     12/15/2017    10:23 AM  [phone]

## 2017-12-19 ENCOUNTER — OFFICE VISIT (OUTPATIENT)
Dept: FAMILY MEDICINE | Facility: CLINIC | Age: 62
End: 2017-12-19
Payer: COMMERCIAL

## 2017-12-19 VITALS
DIASTOLIC BLOOD PRESSURE: 71 MMHG | TEMPERATURE: 101 F | HEART RATE: 83 BPM | OXYGEN SATURATION: 98 % | SYSTOLIC BLOOD PRESSURE: 123 MMHG

## 2017-12-19 DIAGNOSIS — J02.9 SORE THROAT: ICD-10-CM

## 2017-12-19 DIAGNOSIS — D69.6 THROMBOCYTOPENIA (H): ICD-10-CM

## 2017-12-19 DIAGNOSIS — Z78.9 DISCOMFORT: ICD-10-CM

## 2017-12-19 DIAGNOSIS — B37.81 CANDIDA ESOPHAGITIS (H): Primary | ICD-10-CM

## 2017-12-19 DIAGNOSIS — R50.9 FEVER, UNSPECIFIED FEVER CAUSE: ICD-10-CM

## 2017-12-19 DIAGNOSIS — G35 MS (MULTIPLE SCLEROSIS) (H): ICD-10-CM

## 2017-12-19 LAB
BASOPHILS # BLD AUTO: 0 10E9/L (ref 0–0.2)
BASOPHILS NFR BLD AUTO: 0.2 %
DEPRECATED S PYO AG THROAT QL EIA: NORMAL
DIFFERENTIAL METHOD BLD: ABNORMAL
EOSINOPHIL # BLD AUTO: 0 10E9/L (ref 0–0.7)
EOSINOPHIL NFR BLD AUTO: 0 %
ERYTHROCYTE [DISTWIDTH] IN BLOOD BY AUTOMATED COUNT: 15.4 % (ref 10–15)
HCT VFR BLD AUTO: 29.3 % (ref 35–47)
HGB BLD-MCNC: 9.4 G/DL (ref 11.7–15.7)
LYMPHOCYTES # BLD AUTO: 0 10E9/L (ref 0.8–5.3)
LYMPHOCYTES NFR BLD AUTO: 0.7 %
MCH RBC QN AUTO: 29.7 PG (ref 26.5–33)
MCHC RBC AUTO-ENTMCNC: 32.1 G/DL (ref 31.5–36.5)
MCV RBC AUTO: 93 FL (ref 78–100)
MONOCYTES # BLD AUTO: 0.1 10E9/L (ref 0–1.3)
MONOCYTES NFR BLD AUTO: 2.6 %
NEUTROPHILS # BLD AUTO: 4 10E9/L (ref 1.6–8.3)
NEUTROPHILS NFR BLD AUTO: 96.5 %
PLATELET # BLD AUTO: 146 10E9/L (ref 150–450)
RBC # BLD AUTO: 3.16 10E12/L (ref 3.8–5.2)
SPECIMEN SOURCE: NORMAL
WBC # BLD AUTO: 4.2 10E9/L (ref 4–11)

## 2017-12-19 PROCEDURE — 36415 COLL VENOUS BLD VENIPUNCTURE: CPT | Performed by: FAMILY MEDICINE

## 2017-12-19 PROCEDURE — 87081 CULTURE SCREEN ONLY: CPT | Performed by: FAMILY MEDICINE

## 2017-12-19 PROCEDURE — 85025 COMPLETE CBC W/AUTO DIFF WBC: CPT | Performed by: FAMILY MEDICINE

## 2017-12-19 PROCEDURE — 87040 BLOOD CULTURE FOR BACTERIA: CPT | Performed by: FAMILY MEDICINE

## 2017-12-19 PROCEDURE — 87880 STREP A ASSAY W/OPTIC: CPT | Performed by: FAMILY MEDICINE

## 2017-12-19 PROCEDURE — 99214 OFFICE O/P EST MOD 30 MIN: CPT | Performed by: FAMILY MEDICINE

## 2017-12-19 RX ORDER — FLUCONAZOLE 100 MG/1
TABLET ORAL
Qty: 15 TABLET | Refills: 0 | Status: SHIPPED | OUTPATIENT
Start: 2017-12-19 | End: 2018-10-31

## 2017-12-19 NOTE — MR AVS SNAPSHOT
"              After Visit Summary   12/19/2017    Marylee Woods    MRN: [id number]           Patient Information     Date Of Birth          1955        Visit Information        Provider Department      12/19/2017 4:00 PM Carolina Pulliam MD [facility]        Today's Diagnoses     Candida esophagitis (H)    -  1    Discomfort        Fever, unspecified fever cause        Sore throat          Care Instructions    Your strep test was negative, and with your history of recent antibiotic use and esophageal candida infection we're going to treat you \"empirically\" (meaning best guess) for a yeast or esophageal candida infection again.    This means taking 200 mg of fluconazole today, and then 100 mg daily for 14 days total.    I also would like you to have a blood culture drawn today given your fever, to make sure we don't need to treat you with a stronger or different medicine for longer.    If your fever persists for 2-3 days more, please come back to clinic or go to ER if feeling really poorly or worse.              Follow-ups after your visit        Who to contact     If you have questions or need follow up information about today's clinic visit or your schedule please contact [facility] directly at [phone].  Normal or non-critical lab and imaging results will be communicated to you by [employer], letter or phone within 4 business days after the clinic has received the results. If you do not hear from us within 7 days, please contact the clinic through [employer] or phone. If you have a critical or abnormal lab result, we will notify you by phone as soon as possible.  Submit refill requests through [employer] or call your pharmacy and they will forward the refill request to us. Please allow 3 business days for your refill to be completed.          Additional Information About Your Visit        MyChart Information     [employer] lets you send messages " "to your doctor, view your test results, renew your prescriptions, schedule appointments and more. To sign up, go to [email or web address] . Click on \"Log in\" on the left side of the screen, which will take you to the Welcome page. Then click on \"Sign up Now\" on the right side of the page.     You will be asked to enter the access code listed below, as well as some personal information. Please follow the directions to create your username and password.     Your access code is: [email or web address]  Expires: 2018 11:13 AM     Your access code will  in 90 days. If you need help or a new code, please call your [place] clinic or [phone].        Care EveryWhere ID     This is your Care EveryWhere ID. This could be used by other organizations to access your [facility] medical records  [id number]        Your Vitals Were     Pulse Temperature Last Period Pulse Oximetry Breastfeeding?       83 101  F (38.3  C) (Oral) (LMP Unknown) 98% No        Blood Pressure from Last 3 Encounters:   17 123/71   17 110/72   17 129/68    Weight from Last 3 Encounters:   17 130 lb (59 kg)   17 130 lb 4.8 oz (59.1 kg)   17 142 lb 3.2 oz (64.5 kg)              We Performed the Following     Beta strep group A culture     Blood culture     Strep, Rapid Screen          Today's Medication Changes          These changes are accurate as of: 17  4:38 PM.  If you have any questions, ask your nurse or doctor.               Start taking these medicines.        Dose/Directions    fluconazole 100 MG tablet   Commonly known as:  DIFLUCAN   Used for:  Candida esophagitis (H)   Started by:  Carolina Pulliam MD        Take 200 mg today, then 100 mg daily x 14 days total.   Quantity:  15 tablet   Refills:  0         These medicines have changed or have updated prescriptions.        Dose/Directions    valACYclovir 500 MG tablet   Commonly known as:  VALTREX   This may have changed:    - when to take this  - " reasons to take this   Used for:  Herpes simplex disease        Dose:  500 mg   Take 1 tablet (500 mg) by mouth 2 times daily   Quantity:  12 tablet   Refills:  5            Where to get your medicines      These medications were sent to [facility] [address] [address] AT [address] [address]     Phone:  [phone]     fluconazole 100 MG tablet                Primary Care Provider Office Phone # Fax #    Carolina Pulliam [phone] [address] [facility] [address]        Equal Access to Services     KAYDEN PALOMINO : Hadii gabriela pablo hadasho Soomaali, waaxda luqadaha, qaybta kaalmada ernesto, kaylen palma . So [facility] [phone].    ATENCIÓN: Si habla español, tiene a de oliveira disposición servicios gratuitos de asistencia lingüística. [facility] [phone].    We comply with applicable federal civil rights laws and [place] laws. We do not discriminate on the basis of race, color, national origin, age, disability, sex, sexual orientation, or gender identity.            Thank you!     Thank you for choosing [facility]  for your care. Our goal is always to provide you with excellent care. Hearing back from our patients is one way we can continue to improve our services. Please take a few minutes to complete the written survey that you may receive in the mail after your visit with us. Thank you!             Your Updated Medication List - Protect others around you: Learn how to safely use, store and throw away your medicines at www.disposemymeds.org.          This list is accurate as of: 12/19/17  4:38 PM.  Always use your most recent med list.                   Brand Name Dispense Instructions for use Diagnosis    acetaminophen 325 MG tablet    TYLENOL    100 tablet    Take 2 tablets (650 mg) by mouth every 4 hours as needed for mild pain or fever (greater than 101  degrees)    Multiple sclerosis exacerbation (H)       baclofen 10 MG tablet    LIORESAL    90 tablet    Take 2 tablets (20 mg) by mouth 2 times daily    Preop general physical exam, MS (multiple sclerosis) (H)       cholecalciferol 5000 UNITS Caps capsule    vitamin D3     Take 1 capsule (5,000 Units) by mouth daily    MS (multiple sclerosis) (H)       fluconazole 100 MG tablet    DIFLUCAN    15 tablet    Take 200 mg today, then 100 mg daily x 14 days total.    Candida esophagitis (H)       * gabapentin 300 MG capsule    NEURONTIN    90 capsule    Take 2 capsules (600 mg) by mouth every morning    Paresthesia       * gabapentin 300 MG capsule    NEURONTIN    90 capsule    Take 3 capsules (900 mg) by mouth At Bedtime    Paresthesia       hydrochlorothiazide 12.5 MG Tabs tablet     90 tablet    Take 1 tablet (12.5 mg) by mouth daily    Essential hypertension, benign       multivitamin, therapeutic with minerals Tabs tablet      Take 1 tablet by mouth daily.    Anemia       NICOTROL 10 MG Inhaler   Generic drug:  nicotine      Inhale 1 Cartridge into the lungs daily as needed for smoking cessation        nitroFURantoin macrocrystal 100 MG capsule    MACRODANTIN     Take 100 mg by mouth daily UTI prophylaxis        omeprazole 40 MG capsule    priLOSEC     Take one capsule by mouth daily with breakfast.        order for DME     1 Units    Equipment being ordered: medical lift chair    MS (multiple sclerosis) (H)       oxybutynin 10 MG 24 hr tablet    DITROPAN-XL    30 tablet    Take 1 tablet (10 mg) by mouth 2 times daily    Neurogenic bladder       polyethylene glycol Packet    MIRALAX/GLYCOLAX    30 packet    Take 17 g by mouth daily    Multiple sclerosis exacerbation (H)       POTASSIUM CHLORIDE PO      Take 99 mg by mouth daily        senna-docusate 8.6-50 MG per tablet    SENOKOT-S;PERICOLACE    100 tablet    Take 2 tablets by mouth 2 times daily    Multiple sclerosis exacerbation (H)       sertraline 100 MG tablet     ZOLOFT    135 tablet    Take 1.5 tablets (150 mg) by mouth daily    Major depression in complete remission (H)       valACYclovir 500 MG tablet    VALTREX    12 tablet    Take 1 tablet (500 mg) by mouth 2 times daily    Herpes simplex disease       * Notice:  This list has 2 medication(s) that are the same as other medications prescribed for you. Read the directions carefully, and ask your doctor or other care provider to review them with you.

## 2017-12-19 NOTE — TELEPHONE ENCOUNTER
Faxed 12/19/17 1-2-17 Paola called to ask for extra visits; 2x2 then 1x1- verbal ok given- form to follow

## 2017-12-19 NOTE — PROGRESS NOTES
Negative strep test was reviewed with patient in office; see my office visit note for details.   Carolina Pulliam MD

## 2017-12-19 NOTE — PATIENT INSTRUCTIONS
"Your strep test was negative, and with your history of recent antibiotic use and esophageal candida infection we're going to treat you \"empirically\" (meaning best guess) for a yeast or esophageal candida infection again.    This means taking 200 mg of fluconazole today, and then 100 mg daily for 14 days total.    I also would like you to have a blood culture drawn today given your fever, to make sure we don't need to treat you with a stronger or different medicine for longer.    If your fever persists for 2-3 days more, please come back to clinic or go to ER if feeling really poorly or worse.      "

## 2017-12-19 NOTE — NURSING NOTE
"Chief Complaint   Patient presents with     Pharyngitis       Initial /71 (BP Location: Left arm, Patient Position: Chair, Cuff Size: Adult Regular)  Pulse 83  Temp 101  F (38.3  C) (Oral)  LMP  (LMP Unknown)  SpO2 98%  Breastfeeding? No Estimated body mass index is 19.77 kg/(m^2) as calculated from the following:    Height as of 8/4/17: 5' 8\" (1.727 m).    Weight as of 12/12/17: 130 lb (59 kg).  Medication Reconciliation: complete     Anna Nobles MA     "

## 2017-12-19 NOTE — PROGRESS NOTES
SUBJECTIVE:   Marylee Woods is a 62 year old female who presents to clinic today for the following health issues:      Acute Illness   Acute illness concerns: Fever   Onset: x 3 days     Fever: YES    Chills/Sweats: Yes - Chills     Headache (location?): YES    Sinus Pressure:no    Conjunctivitis:  no    Ear Pain: no    Rhinorrhea: no    Congestion: no     Sore Throat: YES     Cough: no    Wheeze: no    Decreased Appetite: YES    Nausea: YES    Vomiting: no     Diarrhea:  no    Dysuria/Freq.: YES-  Burning sensation     Fatigue/Achiness: YES    Sick/Strep Exposure: no     Therapies Tried and outcome: Tylenol - shows mild  improvement       62 year old with MS recently hospitalized with exacerbation and with history of recent antibiotics course x 2, here with 2-3 days history of painful swallowing and mild fever, similar to last time when she has yeast infection in throat.  No exposure to strep.  No runny nose or cough.    Problem list and histories reviewed & adjusted, as indicated.  Additional history: as documented    Reviewed and updated as needed this visit by clinical staffTobacco  Allergies  Meds  Med Hx  Surg Hx  Fam Hx  Soc Hx      Reviewed and updated as needed this visit by Provider         ROS:  C: NEGATIVE for fever, chills, change in weight  E/M: NEGATIVE for ear, mouth and throat problems  R: NEGATIVE for significant cough or SOB  CV: NEGATIVE for chest pain, palpitations or peripheral edema    OBJECTIVE:     /71 (BP Location: Left arm, Patient Position: Chair, Cuff Size: Adult Regular)  Pulse 83  Temp 101  F (38.3  C) (Oral)  LMP  (LMP Unknown)  SpO2 98%  Breastfeeding? No  There is no height or weight on file to calculate BMI.  GENERAL: healthy, alert and no distress  EYES: Eyes grossly normal to inspection, PERRL and conjunctivae and sclerae normal  HENT: ear canals and TM's normal, nose and mouth without ulcers or lesions  NECK: no adenopathy, no asymmetry, masses, or scars and  "thyroid normal to palpation  RESP: lungs clear to auscultation - no rales, rhonchi or wheezes    Diagnostic Test Results:  Results for orders placed or performed in visit on 12/19/17 (from the past 24 hour(s))   Strep, Rapid Screen   Result Value Ref Range    Specimen Description Throat     Rapid Strep A Screen       NEGATIVE: No Group A streptococcal antigen detected by immunoassay, await culture report.       ASSESSMENT/PLAN:       ICD-10-CM    1. Candida esophagitis (H) B37.81 Blood culture   2. Discomfort Z78.9 Strep, Rapid Screen     Beta strep group A culture   3. Fever, unspecified fever cause R50.9    4. Sore throat J02.9        Patient Instructions   Your strep test was negative, and with your history of recent antibiotic use and esophageal candida infection we're going to treat you \"empirically\" (meaning best guess) for a yeast or esophageal candida infection again.    This means taking 200 mg of fluconazole today, and then 100 mg daily for 14 days total.    I also would like you to have a blood culture drawn today given your fever, to make sure we don't need to treat you with a stronger or different medicine for longer.    If your fever persists for 2-3 days more, please come back to clinic or go to ER if feeling really poorly or worse.          Carolina Pulliam MD  [facility]  "

## 2017-12-20 LAB
BACTERIA SPEC CULT: NORMAL
SPECIMEN SOURCE: NORMAL

## 2017-12-20 NOTE — PROGRESS NOTES
Can you call Marylee and see how she's feeling tomorrow?  Thanks RNs!  We treated her with diflucan for sore throat and fever for suspected esophageal candidiasis - I want to see how she's doing after 2 days of medicine.  Thanks much!  Dr. Carolina Pulliam MD/[facility]

## 2017-12-21 ENCOUNTER — TELEPHONE (OUTPATIENT)
Dept: ONCOLOGY | Facility: CLINIC | Age: 62
End: 2017-12-21

## 2017-12-21 LAB
ERYTHROCYTE [DISTWIDTH] IN BLOOD BY AUTOMATED COUNT: 15.4 % (ref 10–15)
HCT VFR BLD AUTO: 28.1 % (ref 35–47)
HGB BLD-MCNC: 9 G/DL (ref 11.7–15.7)
MCH RBC QN AUTO: 28.8 PG (ref 26.5–33)
MCHC RBC AUTO-ENTMCNC: 32 G/DL (ref 31.5–36.5)
MCV RBC AUTO: 90 FL (ref 78–100)
PLATELET # BLD AUTO: 176 10E9/L (ref 150–450)
RBC # BLD AUTO: 3.13 10E12/L (ref 3.8–5.2)
WBC # BLD AUTO: 1.9 10E9/L (ref 4–11)

## 2017-12-21 PROCEDURE — 85027 COMPLETE CBC AUTOMATED: CPT | Performed by: INTERNAL MEDICINE

## 2017-12-21 NOTE — TELEPHONE ENCOUNTER
I called [facility] and had patient set up for another CBC p-d tomorrow. Patient is aware. Prerna Roberts

## 2017-12-22 LAB
BASOPHILS # BLD AUTO: 0 10E9/L (ref 0–0.2)
BASOPHILS NFR BLD AUTO: 0.5 %
DIFFERENTIAL METHOD BLD: ABNORMAL
EOSINOPHIL # BLD AUTO: 0 10E9/L (ref 0–0.7)
EOSINOPHIL NFR BLD AUTO: 0 %
ERYTHROCYTE [DISTWIDTH] IN BLOOD BY AUTOMATED COUNT: 15.5 % (ref 10–15)
HCT VFR BLD AUTO: 29.5 % (ref 35–47)
HGB BLD-MCNC: 9.4 G/DL (ref 11.7–15.7)
IMM GRANULOCYTES # BLD: 0 10E9/L (ref 0–0.4)
IMM GRANULOCYTES NFR BLD: 0 %
LYMPHOCYTES # BLD AUTO: 0.1 10E9/L (ref 0.8–5.3)
LYMPHOCYTES NFR BLD AUTO: 3.6 %
MCH RBC QN AUTO: 29 PG (ref 26.5–33)
MCHC RBC AUTO-ENTMCNC: 31.9 G/DL (ref 31.5–36.5)
MCV RBC AUTO: 91 FL (ref 78–100)
MONOCYTES # BLD AUTO: 0.2 10E9/L (ref 0–1.3)
MONOCYTES NFR BLD AUTO: 7.7 %
NEUTROPHILS # BLD AUTO: 1.9 10E9/L (ref 1.6–8.3)
NEUTROPHILS NFR BLD AUTO: 88.2 %
NRBC # BLD AUTO: 0 10*3/UL
NRBC BLD AUTO-RTO: 0 /100
PLATELET # BLD AUTO: 166 10E9/L (ref 150–450)
RBC # BLD AUTO: 3.24 10E12/L (ref 3.8–5.2)
WBC # BLD AUTO: 2.2 10E9/L (ref 4–11)

## 2017-12-22 PROCEDURE — 85025 COMPLETE CBC W/AUTO DIFF WBC: CPT | Performed by: INTERNAL MEDICINE

## 2017-12-22 NOTE — TELEPHONE ENCOUNTER
Update. Labs completed and reviewed with Dr. Guerrier he advised to recheck labs in 1 week as scheduled per HC and reevaluate at that time. No changes today. Patient is aware and verbalizes understanding. Prerna Roberts

## 2017-12-26 LAB
BACTERIA SPEC CULT: NO GROWTH
Lab: NORMAL
SPECIMEN SOURCE: NORMAL

## 2017-12-27 DIAGNOSIS — D72.819 LEUKOPENIA, UNSPECIFIED TYPE: Primary | ICD-10-CM

## 2017-12-27 LAB
ERYTHROCYTE [DISTWIDTH] IN BLOOD BY AUTOMATED COUNT: 16.5 % (ref 10–15)
HCT VFR BLD AUTO: 31.5 % (ref 35–47)
HGB BLD-MCNC: 9.9 G/DL (ref 11.7–15.7)
MCH RBC QN AUTO: 29.5 PG (ref 26.5–33)
MCHC RBC AUTO-ENTMCNC: 31.4 G/DL (ref 31.5–36.5)
MCV RBC AUTO: 94 FL (ref 78–100)
PLATELET # BLD AUTO: 140 10E9/L (ref 150–450)
RBC # BLD AUTO: 3.36 10E12/L (ref 3.8–5.2)
WBC # BLD AUTO: 2.3 10E9/L (ref 4–11)

## 2017-12-27 PROCEDURE — 85027 COMPLETE CBC AUTOMATED: CPT | Performed by: INTERNAL MEDICINE

## 2017-12-28 ENCOUNTER — TELEPHONE (OUTPATIENT)
Dept: FAMILY MEDICINE | Facility: CLINIC | Age: 62
End: 2017-12-28

## 2017-12-28 NOTE — TELEPHONE ENCOUNTER
Our goal is to have forms completed within 72 hours, however some forms may require a visit or additional information.    What clinic location was the form placed at [facility] or [place].?     Who is the form from? [facility] Period 12/5/17 - 2/2/18  Where did the form come from? Faxed to clinic   The form was placed in the inbox of Carolina Pulliam MD      Please fax to [phone]    Additional comments:     Call take on 12/28/2017 at 4:15 PM by Zahida Pulliam

## 2018-01-03 ENCOUNTER — TELEPHONE (OUTPATIENT)
Dept: FAMILY MEDICINE | Facility: CLINIC | Age: 63
End: 2018-01-03

## 2018-01-03 DIAGNOSIS — D61.818 PANCYTOPENIA (H): Primary | ICD-10-CM

## 2018-01-03 LAB
ERYTHROCYTE [DISTWIDTH] IN BLOOD BY AUTOMATED COUNT: 17.1 % (ref 10–15)
HCT VFR BLD AUTO: 29.2 % (ref 35–47)
HGB BLD-MCNC: 9.1 G/DL (ref 11.7–15.7)
MCH RBC QN AUTO: 29.4 PG (ref 26.5–33)
MCHC RBC AUTO-ENTMCNC: 31.2 G/DL (ref 31.5–36.5)
MCV RBC AUTO: 95 FL (ref 78–100)
PLATELET # BLD AUTO: 114 10E9/L (ref 150–450)
RBC # BLD AUTO: 3.09 10E12/L (ref 3.8–5.2)
WBC # BLD AUTO: 2 10E9/L (ref 4–11)

## 2018-01-03 PROCEDURE — 85027 COMPLETE CBC AUTOMATED: CPT | Performed by: INTERNAL MEDICINE

## 2018-01-04 ENCOUNTER — TELEPHONE (OUTPATIENT)
Dept: ONCOLOGY | Facility: CLINIC | Age: 63
End: 2018-01-04

## 2018-01-04 NOTE — TELEPHONE ENCOUNTER
I called patient and reviewed 1/3 CBC results and review Neutroepenic precautions. She verbalizes being tired but no other symptoms.     Will have CBC p-d checked in one week. Diff was unable to be added to yesterdays' labs because to much time has passed since specimen has been processed,    Will follow up with patient in 1 week with lab results. Prerna Roberts

## 2018-01-05 ENCOUNTER — TELEPHONE (OUTPATIENT)
Dept: FAMILY MEDICINE | Facility: CLINIC | Age: 63
End: 2018-01-05

## 2018-01-05 DIAGNOSIS — Z53.9 DIAGNOSIS NOT YET DEFINED: Primary | ICD-10-CM

## 2018-01-05 PROCEDURE — G0180 MD CERTIFICATION HHA PATIENT: HCPCS | Performed by: FAMILY MEDICINE

## 2018-01-05 NOTE — TELEPHONE ENCOUNTER
Our goal is to have forms completed within 72 hours, however some forms may require a visit or additional information.    What clinic location was the form placed at [facility] or [place].?     Who is the form from? [facility] - OT  Where did the form come from? Faxed to clinic   The form was placed in the inbox of Carolina Pulliam MD      Please fax to [phone]    Additional comments: Begin date 1/3/2018    Call take on 1/5/2018 at 2:36 PM by Zahida Pulliam

## 2018-01-09 ENCOUNTER — TELEPHONE (OUTPATIENT)
Dept: ONCOLOGY | Facility: CLINIC | Age: 63
End: 2018-01-09

## 2018-01-09 DIAGNOSIS — D69.6 THROMBOCYTOPENIA (H): ICD-10-CM

## 2018-01-09 LAB
BASOPHILS # BLD AUTO: 0 10E9/L (ref 0–0.2)
BASOPHILS NFR BLD AUTO: 1.2 %
DIFFERENTIAL METHOD BLD: ABNORMAL
EOSINOPHIL # BLD AUTO: 0.1 10E9/L (ref 0–0.7)
EOSINOPHIL NFR BLD AUTO: 8.1 %
ERYTHROCYTE [DISTWIDTH] IN BLOOD BY AUTOMATED COUNT: 17.2 % (ref 10–15)
HCT VFR BLD AUTO: 28.8 % (ref 35–47)
HGB BLD-MCNC: 9.2 G/DL (ref 11.7–15.7)
LYMPHOCYTES # BLD AUTO: 0.4 10E9/L (ref 0.8–5.3)
LYMPHOCYTES NFR BLD AUTO: 22.4 %
MCH RBC QN AUTO: 30.6 PG (ref 26.5–33)
MCHC RBC AUTO-ENTMCNC: 31.9 G/DL (ref 31.5–36.5)
MCV RBC AUTO: 96 FL (ref 78–100)
MONOCYTES # BLD AUTO: 0.2 10E9/L (ref 0–1.3)
MONOCYTES NFR BLD AUTO: 10.6 %
NEUTROPHILS # BLD AUTO: 0.9 10E9/L (ref 1.6–8.3)
NEUTROPHILS NFR BLD AUTO: 57.7 %
PLATELET # BLD AUTO: 93 10E9/L (ref 150–450)
RBC # BLD AUTO: 3.01 10E12/L (ref 3.8–5.2)
WBC # BLD AUTO: 1.6 10E9/L (ref 4–11)

## 2018-01-09 PROCEDURE — 36592 COLLECT BLOOD FROM PICC: CPT | Performed by: FAMILY MEDICINE

## 2018-01-09 PROCEDURE — 85025 COMPLETE CBC W/AUTO DIFF WBC: CPT | Performed by: FAMILY MEDICINE

## 2018-01-09 PROCEDURE — 36415 COLL VENOUS BLD VENIPUNCTURE: CPT | Performed by: FAMILY MEDICINE

## 2018-01-09 NOTE — TELEPHONE ENCOUNTER
Received a phone call from lab with an abnormal WBC count of 1.61. Will route to Dr. Guerrier and DR. Guerrier's clinic [facility]. Sarah Butler RN

## 2018-01-10 ENCOUNTER — TELEPHONE (OUTPATIENT)
Dept: FAMILY MEDICINE | Facility: CLINIC | Age: 63
End: 2018-01-10

## 2018-01-10 NOTE — TELEPHONE ENCOUNTER
Our goal is to have forms completed within 72 hours, however some forms may require a visit or additional information.    What clinic location was the form placed at [facility] or [place].?     Who is the form from? [facility] - SN  Where did the form come from? Faxed to clinic   The form was placed in the inbox of Carolina Pulliam MD      Please fax to [phone]    Additional comments: Begin date 1/7/2018    Call take on 1/10/2018 at 3:22 PM by Zahida Pulliam

## 2018-01-10 NOTE — TELEPHONE ENCOUNTER
I have informed Dr. Guerrier labs reviewed please see result note from Dr. Guerrier for details.    LVM with patient and awaiting return call.Prerna Roberts

## 2018-01-18 ENCOUNTER — CARE COORDINATION (OUTPATIENT)
Dept: CARE COORDINATION | Facility: CLINIC | Age: 63
End: 2018-01-18

## 2018-01-18 ENCOUNTER — TELEPHONE (OUTPATIENT)
Dept: NURSING | Facility: CLINIC | Age: 63
End: 2018-01-18

## 2018-01-18 ENCOUNTER — TELEPHONE (OUTPATIENT)
Dept: ONCOLOGY | Facility: CLINIC | Age: 63
End: 2018-01-18

## 2018-01-18 LAB
BASOPHILS # BLD AUTO: 0.1 10E9/L (ref 0–0.2)
BASOPHILS NFR BLD AUTO: 2.7 %
DIFFERENTIAL METHOD BLD: ABNORMAL
EOSINOPHIL # BLD AUTO: 0.2 10E9/L (ref 0–0.7)
EOSINOPHIL NFR BLD AUTO: 9.7 %
ERYTHROCYTE [DISTWIDTH] IN BLOOD BY AUTOMATED COUNT: 15.7 % (ref 10–15)
HCT VFR BLD AUTO: 31.5 % (ref 35–47)
HGB BLD-MCNC: 10.4 G/DL (ref 11.7–15.7)
IMM GRANULOCYTES # BLD: 0 10E9/L (ref 0–0.4)
IMM GRANULOCYTES NFR BLD: 0 %
LYMPHOCYTES # BLD AUTO: 0.4 10E9/L (ref 0.8–5.3)
LYMPHOCYTES NFR BLD AUTO: 23.1 %
MCH RBC QN AUTO: 30.4 PG (ref 26.5–33)
MCHC RBC AUTO-ENTMCNC: 33 G/DL (ref 31.5–36.5)
MCV RBC AUTO: 92 FL (ref 78–100)
MONOCYTES # BLD AUTO: 0.2 10E9/L (ref 0–1.3)
MONOCYTES NFR BLD AUTO: 9.7 %
NEUTROPHILS # BLD AUTO: 1 10E9/L (ref 1.6–8.3)
NEUTROPHILS NFR BLD AUTO: 54.8 %
NRBC # BLD AUTO: 0 10*3/UL
NRBC BLD AUTO-RTO: 0 /100
PLATELET # BLD AUTO: 107 10E9/L (ref 150–450)
RBC # BLD AUTO: 3.42 10E12/L (ref 3.8–5.2)
WBC # BLD AUTO: 1.9 10E9/L (ref 4–11)

## 2018-01-18 PROCEDURE — 85025 COMPLETE CBC W/AUTO DIFF WBC: CPT | Performed by: INTERNAL MEDICINE

## 2018-01-18 NOTE — PROGRESS NOTES
Clinic Care Coordination Contact  Care Team Conversations    Home care nurse, Paola, is requesting order for out patient PT and OT on behalf of patient. She wants to attend [facility].  This is not part of our system.     Patient remains stable at home. She sees oncology for thrombocytopenia from MS treatment.     Will close to clinic care coordination at this time. Re-open as needed.     Aminata Castillo RN, CCM - Care Coordinator     1/18/2018    1:42 PM  [phone]

## 2018-01-18 NOTE — TELEPHONE ENCOUNTER
Paola calling from [facility] requesting order for Out Patient Physical Therapy and Occupational Therapy evaluation and treat. Patient will be going to [facility] a non [place]

## 2018-01-18 NOTE — TELEPHONE ENCOUNTER
I called and spoke with patient regarding having a lab draw for this week.  She stated no one has come out to draw her labs from [facility].     I called [facility] and was able to confirm that someone would be able to go out tomorrow to draw labs to recheck CBC p-d.       Staff msg. Sent to Jessy MOODY to watch for lab results tomorrow 1/19 . Prerna Roberts

## 2018-01-19 NOTE — PROGRESS NOTES
Let her know that CBC is stable.  -WBC of 1.9.  ANC of 1.0.  -Hemoglobin of 10.4  -Platelet of 107.    She should have CBC rechecked in one week.  She should go to emergency room if there is any fever, chills or infection.    Stan Guerrier

## 2018-01-24 DIAGNOSIS — D69.6 THROMBOCYTOPENIA (H): ICD-10-CM

## 2018-01-24 LAB
BASOPHILS # BLD AUTO: 0 10E9/L (ref 0–0.2)
BASOPHILS NFR BLD AUTO: 0.6 %
DIFFERENTIAL METHOD BLD: ABNORMAL
EOSINOPHIL # BLD AUTO: 0.1 10E9/L (ref 0–0.7)
EOSINOPHIL NFR BLD AUTO: 7.4 %
ERYTHROCYTE [DISTWIDTH] IN BLOOD BY AUTOMATED COUNT: 15.2 % (ref 10–15)
HCT VFR BLD AUTO: 30.6 % (ref 35–47)
HGB BLD-MCNC: 9.9 G/DL (ref 11.7–15.7)
LYMPHOCYTES # BLD AUTO: 0.3 10E9/L (ref 0.8–5.3)
LYMPHOCYTES NFR BLD AUTO: 18.8 %
MCH RBC QN AUTO: 30.7 PG (ref 26.5–33)
MCHC RBC AUTO-ENTMCNC: 32.4 G/DL (ref 31.5–36.5)
MCV RBC AUTO: 95 FL (ref 78–100)
MONOCYTES # BLD AUTO: 0.2 10E9/L (ref 0–1.3)
MONOCYTES NFR BLD AUTO: 10.8 %
NEUTROPHILS # BLD AUTO: 1.1 10E9/L (ref 1.6–8.3)
NEUTROPHILS NFR BLD AUTO: 62.4 %
PLATELET # BLD AUTO: 93 10E9/L (ref 150–450)
RBC # BLD AUTO: 3.23 10E12/L (ref 3.8–5.2)
WBC # BLD AUTO: 1.8 10E9/L (ref 4–11)

## 2018-01-24 PROCEDURE — 85025 COMPLETE CBC W/AUTO DIFF WBC: CPT | Performed by: FAMILY MEDICINE

## 2018-01-24 PROCEDURE — 36415 COLL VENOUS BLD VENIPUNCTURE: CPT | Performed by: FAMILY MEDICINE

## 2018-01-30 ENCOUNTER — TRANSFERRED RECORDS (OUTPATIENT)
Dept: HEALTH INFORMATION MANAGEMENT | Facility: CLINIC | Age: 63
End: 2018-01-30

## 2018-01-30 LAB
CREAT SERPL-MCNC: 0.85 MG/DL (ref 0.57–1)
GFR SERPL CREATININE-BSD FRML MDRD: 74 ML/MIN/1.73M2
TSH SERPL-ACNC: 1.88 UIU/ML (ref 0.45–4.5)

## 2018-01-31 LAB
ERYTHROCYTE [DISTWIDTH] IN BLOOD BY AUTOMATED COUNT: 14.4 % (ref 10–15)
HCT VFR BLD AUTO: 31.9 % (ref 35–47)
HGB BLD-MCNC: 10.2 G/DL (ref 11.7–15.7)
MCH RBC QN AUTO: 30 PG (ref 26.5–33)
MCHC RBC AUTO-ENTMCNC: 32 G/DL (ref 31.5–36.5)
MCV RBC AUTO: 94 FL (ref 78–100)
PLATELET # BLD AUTO: 92 10E9/L (ref 150–450)
RBC # BLD AUTO: 3.4 10E12/L (ref 3.8–5.2)
WBC # BLD AUTO: 1.9 10E9/L (ref 4–11)

## 2018-01-31 PROCEDURE — 85027 COMPLETE CBC AUTOMATED: CPT | Performed by: FAMILY MEDICINE

## 2018-01-31 NOTE — PROGRESS NOTES
Hello Birendra - these results came to me but looks like you wanted a follow up in 1 week so I wanted to be sure you saw them.  -Carolina Pulliam MD/[facility]

## 2018-02-03 DIAGNOSIS — R30.0 DYSURIA: ICD-10-CM

## 2018-02-03 DIAGNOSIS — R82.90 ABNORMAL URINE FINDINGS: Primary | ICD-10-CM

## 2018-02-03 LAB
ALBUMIN UR-MCNC: NEGATIVE MG/DL
APPEARANCE UR: ABNORMAL
BACTERIA #/AREA URNS HPF: ABNORMAL /HPF
BILIRUB UR QL STRIP: NEGATIVE
COLOR UR AUTO: YELLOW
GLUCOSE UR STRIP-MCNC: NEGATIVE MG/DL
HGB UR QL STRIP: ABNORMAL
KETONES UR STRIP-MCNC: NEGATIVE MG/DL
LEUKOCYTE ESTERASE UR QL STRIP: ABNORMAL
NITRATE UR QL: POSITIVE
NON-SQ EPI CELLS #/AREA URNS LPF: ABNORMAL /LPF
PH UR STRIP: 5.5 PH (ref 5–7)
RBC #/AREA URNS AUTO: ABNORMAL /HPF
SOURCE: ABNORMAL
SP GR UR STRIP: 1.01 (ref 1–1.03)
UROBILINOGEN UR STRIP-ACNC: 0.2 EU/DL (ref 0.2–1)
WBC #/AREA URNS AUTO: ABNORMAL /HPF

## 2018-02-03 PROCEDURE — 87088 URINE BACTERIA CULTURE: CPT | Performed by: FAMILY MEDICINE

## 2018-02-03 PROCEDURE — 87186 SC STD MICRODIL/AGAR DIL: CPT | Performed by: FAMILY MEDICINE

## 2018-02-03 PROCEDURE — 81001 URINALYSIS AUTO W/SCOPE: CPT | Performed by: FAMILY MEDICINE

## 2018-02-03 PROCEDURE — 87086 URINE CULTURE/COLONY COUNT: CPT | Performed by: FAMILY MEDICINE

## 2018-02-05 ENCOUNTER — TELEPHONE (OUTPATIENT)
Dept: FAMILY MEDICINE | Facility: CLINIC | Age: 63
End: 2018-02-05

## 2018-02-05 DIAGNOSIS — N30.00 ACUTE CYSTITIS WITHOUT HEMATURIA: Primary | ICD-10-CM

## 2018-02-05 NOTE — TELEPHONE ENCOUNTER
Reason for Call:  Other     Detailed comments: brought in urine Sat. To the [facility] office.  Please let patient know either way about results, needs to notify a different Dr.    Phone Number Patient can be reached at: Home number on file [phone] (home)    Best Time: any    Can we leave a detailed message on this number? YES    Call taken on 2/5/2018 at 12:40 PM by KYRA BAILEY

## 2018-02-05 NOTE — TELEPHONE ENCOUNTER
Patient called wanting to know results of UA culture. Told her it was not in yet. Requests a call back when the results are in.

## 2018-02-06 LAB
BACTERIA SPEC CULT: ABNORMAL
SPECIMEN SOURCE: ABNORMAL

## 2018-02-06 RX ORDER — SULFAMETHOXAZOLE/TRIMETHOPRIM 800-160 MG
1 TABLET ORAL 2 TIMES DAILY
Qty: 6 TABLET | Refills: 0 | Status: SHIPPED | OUTPATIENT
Start: 2018-02-06 | End: 2018-03-02

## 2018-02-06 NOTE — TELEPHONE ENCOUNTER
Sent antibiotics; bactrim to [facility] in [facility] for Urinary Tract Infection.  Can you let her know?  Dr. Carolina Pulliam MD/[facility]

## 2018-02-08 ENCOUNTER — TELEPHONE (OUTPATIENT)
Dept: FAMILY MEDICINE | Facility: CLINIC | Age: 63
End: 2018-02-08

## 2018-02-08 NOTE — TELEPHONE ENCOUNTER
Our goal is to have forms completed within 72 hours, however some forms may require a visit or additional information.    What clinic location was the form placed at [facility] or [place].?     Who is the form from? [facility] - PT   Where did the form come from? Faxed to clinic   The form was placed in the inbox of Carolina Pulliam MD      Please fax to [phone]    Additional comments:     Call take on 2/8/2018 at 10:49 AM by Zahida Pulliam

## 2018-02-13 ENCOUNTER — TELEPHONE (OUTPATIENT)
Dept: FAMILY MEDICINE | Facility: CLINIC | Age: 63
End: 2018-02-13

## 2018-02-13 DIAGNOSIS — Z53.9 DIAGNOSIS NOT YET DEFINED: Primary | ICD-10-CM

## 2018-02-13 PROCEDURE — G0180 MD CERTIFICATION HHA PATIENT: HCPCS | Performed by: FAMILY MEDICINE

## 2018-02-13 NOTE — TELEPHONE ENCOUNTER
Our goal is to have forms completed within 72 hours, however some forms may require a visit or additional information.    What clinic location was the form placed at [facility] or [place].?     Who is the form from? [facility] -  Cert Period 1/9/18 - 3/9/18  Where did the form come from? Faxed to clinic   The form was placed in the inbox of Carolina Pulliam MD      Please fax to [phone]    Additional comments:     Call take on 2/13/2018 at 11:40 AM by Zahida Pulliam

## 2018-02-27 ENCOUNTER — TRANSFERRED RECORDS (OUTPATIENT)
Dept: HEALTH INFORMATION MANAGEMENT | Facility: CLINIC | Age: 63
End: 2018-02-27

## 2018-02-27 LAB
CREAT SERPL-MCNC: 1.07 MG/DL (ref 0.57–1)
GFR SERPL CREATININE-BSD FRML MDRD: 56 ML/MIN/1.73M2
TSH SERPL-ACNC: 1.97 UIU/ML (ref 0.45–4.5)

## 2018-03-01 ENCOUNTER — MEDICAL CORRESPONDENCE (OUTPATIENT)
Dept: HEALTH INFORMATION MANAGEMENT | Facility: CLINIC | Age: 63
End: 2018-03-01

## 2018-03-02 ENCOUNTER — TELEPHONE (OUTPATIENT)
Dept: FAMILY MEDICINE | Facility: CLINIC | Age: 63
End: 2018-03-02

## 2018-03-02 DIAGNOSIS — N30.00 ACUTE CYSTITIS WITHOUT HEMATURIA: Primary | ICD-10-CM

## 2018-03-02 RX ORDER — SULFAMETHOXAZOLE/TRIMETHOPRIM 800-160 MG
1 TABLET ORAL 2 TIMES DAILY
Qty: 14 TABLET | Refills: 0 | Status: SHIPPED | OUTPATIENT
Start: 2018-03-02 | End: 2018-04-24

## 2018-03-02 NOTE — TELEPHONE ENCOUNTER
Patient called, her urine was tested again at [facility] [phone] on 2/27/18 and she was told it looks exactly the same as last time. They need to know If the patient needs same treatment as last time. Report should have been faxed yesterday per pt. Will ask Dr. Pulliam if another report should be requested from [facility]?

## 2018-03-02 NOTE — TELEPHONE ENCOUNTER
I haven't seen results - but yes, if has infection again will send txment.  Same medication but longer treatment.  Can you let her know that I sent, and it would be great if they could fax results.  Dr. Carolina Pulliam MD/[facility]

## 2018-03-14 DIAGNOSIS — F32.5 MAJOR DEPRESSION IN COMPLETE REMISSION (H): ICD-10-CM

## 2018-03-15 NOTE — TELEPHONE ENCOUNTER
"Requested Prescriptions   Pending Prescriptions Disp Refills     sertraline (ZOLOFT) 100 MG tablet  Last Written Prescription Date:  11/6/20187  Last Fill Quantity: 135 tablet,  # refills: 3   Last Office Visit  12/19/2017        with  [facility], P or [facility] prescribing provider:     Future Office Visit:    135 tablet 3     Sig: Take 1.5 tablets (150 mg) by mouth daily    SSRIs Protocol Failed    3/14/2018 10:41 AM       Failed - PHQ-9 score less than 5 in past 6 months    The PHQ-9 criteria is meant to fail. It requires a PHQ-9 score review  PHQ-9 SCORE 11/1/2016 8/4/2017 8/31/2017   Total Score - - -   Total Score MyChart - 7 (Mild depression) 4 (Minimal depression)   Total Score 4 7 4     No flowsheet data found.         Passed - Patient is age 18 or older       Passed - No active pregnancy on record       Passed - No positive pregnancy test in last 12 months       Passed - Recent (6 mo) or future (30 days) visit within the authorizing provider's specialty    Patient had office visit in the last 6 months or has a visit in the next 30 days with authorizing provider or within the authorizing provider's specialty.  See \"Patient Info\" tab in inbasket, or \"Choose Columns\" in Meds & Orders section of the refill encounter.              "

## 2018-03-15 NOTE — TELEPHONE ENCOUNTER
Patient Contact    Attempt # 1    Was call answered?  No.  Left message on voicemail with information to call me back.    Patient needs PHQ 9.

## 2018-03-16 RX ORDER — SERTRALINE HYDROCHLORIDE 100 MG/1
150 TABLET, FILM COATED ORAL DAILY
Qty: 135 TABLET | Refills: 0 | Status: SHIPPED | OUTPATIENT
Start: 2018-03-16 | End: 2018-07-12

## 2018-03-16 NOTE — TELEPHONE ENCOUNTER
Prescription approved per [facility] Refill Protocol for 6 months of refills since last appointment, which was 12/15/17

## 2018-03-17 ASSESSMENT — PATIENT HEALTH QUESTIONNAIRE - PHQ9: SUM OF ALL RESPONSES TO PHQ QUESTIONS 1-9: 4

## 2018-04-24 ENCOUNTER — TELEPHONE (OUTPATIENT)
Dept: FAMILY MEDICINE | Facility: CLINIC | Age: 63
End: 2018-04-24

## 2018-04-24 DIAGNOSIS — N30.00 ACUTE CYSTITIS WITHOUT HEMATURIA: Primary | ICD-10-CM

## 2018-04-24 DIAGNOSIS — R82.90 NONSPECIFIC FINDING ON EXAMINATION OF URINE: Primary | ICD-10-CM

## 2018-04-24 DIAGNOSIS — R30.0 DYSURIA: ICD-10-CM

## 2018-04-24 LAB
ALBUMIN UR-MCNC: NEGATIVE MG/DL
APPEARANCE UR: CLEAR
BACTERIA #/AREA URNS HPF: ABNORMAL /HPF
BILIRUB UR QL STRIP: NEGATIVE
COLOR UR AUTO: YELLOW
GLUCOSE UR STRIP-MCNC: NEGATIVE MG/DL
HGB UR QL STRIP: NEGATIVE
KETONES UR STRIP-MCNC: NEGATIVE MG/DL
LEUKOCYTE ESTERASE UR QL STRIP: ABNORMAL
NITRATE UR QL: POSITIVE
NON-SQ EPI CELLS #/AREA URNS LPF: ABNORMAL /LPF
PH UR STRIP: 5 PH (ref 5–7)
RBC #/AREA URNS AUTO: ABNORMAL /HPF
SOURCE: ABNORMAL
SP GR UR STRIP: 1.01 (ref 1–1.03)
UROBILINOGEN UR STRIP-ACNC: 0.2 EU/DL (ref 0.2–1)
WBC #/AREA URNS AUTO: ABNORMAL /HPF

## 2018-04-24 PROCEDURE — 87186 SC STD MICRODIL/AGAR DIL: CPT | Performed by: FAMILY MEDICINE

## 2018-04-24 PROCEDURE — 81001 URINALYSIS AUTO W/SCOPE: CPT | Performed by: FAMILY MEDICINE

## 2018-04-24 PROCEDURE — 87086 URINE CULTURE/COLONY COUNT: CPT | Performed by: FAMILY MEDICINE

## 2018-04-24 PROCEDURE — 87088 URINE BACTERIA CULTURE: CPT | Performed by: FAMILY MEDICINE

## 2018-04-24 RX ORDER — SULFAMETHOXAZOLE/TRIMETHOPRIM 800-160 MG
1 TABLET ORAL 2 TIMES DAILY
Qty: 14 TABLET | Refills: 0 | Status: SHIPPED | OUTPATIENT
Start: 2018-04-24 | End: 2018-09-05

## 2018-04-24 NOTE — TELEPHONE ENCOUNTER
Patient has Urinary Tract Infection.  Sent antibiotics.  Dr. Carolina Pulliam MD/[facility]        Home Phone [phone]   Mobile [phone]

## 2018-04-26 ENCOUNTER — TRANSFERRED RECORDS (OUTPATIENT)
Dept: HEALTH INFORMATION MANAGEMENT | Facility: CLINIC | Age: 63
End: 2018-04-26

## 2018-04-28 LAB
BACTERIA SPEC CULT: ABNORMAL
SPECIMEN SOURCE: ABNORMAL

## 2018-04-30 NOTE — PROGRESS NOTES
Sensitive to bactrim which was sent 4/24.   Dr. Carolina Pulliam MD/[facility]

## 2018-06-10 DIAGNOSIS — F32.5 MAJOR DEPRESSION IN COMPLETE REMISSION (H): ICD-10-CM

## 2018-06-11 NOTE — TELEPHONE ENCOUNTER
"SERTRALINE  MG TABLET  Last Written Prescription Date:  3/16/2018  Last Fill Quantity: 135 tablets,  # refills: 0   Last office visit: 12/19/2017 with prescribing provider:     Future Office Visit:      Requested Prescriptions   Pending Prescriptions Disp Refills     sertraline (ZOLOFT) 100 MG tablet [Pharmacy Med Name: SERTRALINE  MG TABLET] 135 tablet 0     Sig: TAKE 1.5 TABLETS BY MOUTH DAILY    SSRIs Protocol Passed    6/10/2018 11:26 AM       Passed - PHQ-9 score less than 5 in past 6 months    Please review last PHQ-9 score.          Passed - Patient is age 18 or older       Passed - No active pregnancy on record       Passed - No positive pregnancy test in last 12 months       Passed - Recent (6 mo) or future (30 days) visit within the authorizing provider's specialty    Patient had office visit in the last 6 months or has a visit in the next 30 days with authorizing provider or within the authorizing provider's specialty.  See \"Patient Info\" tab in inbasket, or \"Choose Columns\" in Meds & Orders section of the refill encounter.              "

## 2018-06-11 NOTE — TELEPHONE ENCOUNTER
PHQ-9 SCORE 8/4/2017 8/31/2017 3/16/2018   Total Score - - -   Total Score MyChart 7 (Mild depression) 4 (Minimal depression) -   Total Score 7 4 4     Routing refill request to provider for review/approval because:  Needs to be seen every 6 months for depression

## 2018-06-12 NOTE — TELEPHONE ENCOUNTER
Please have patient do PHQ-9 over the phone or mychart - once we do this okay to fill with 1 refill.  Thanks,  Dr. Carolina Pulliam MD/[facility]

## 2018-06-13 RX ORDER — SERTRALINE HYDROCHLORIDE 100 MG/1
TABLET, FILM COATED ORAL
Qty: 135 TABLET | Refills: 0 | OUTPATIENT
Start: 2018-06-13

## 2018-06-13 NOTE — TELEPHONE ENCOUNTER
PHQ9,completed.Pt advised to schedule appointment in follow up of medications.Pt says that she does not need a refill at this time will call when she does. Patient states that she has cut down on the dosage that is why she does not need a refill.

## 2018-06-14 ASSESSMENT — PATIENT HEALTH QUESTIONNAIRE - PHQ9: SUM OF ALL RESPONSES TO PHQ QUESTIONS 1-9: 5

## 2018-06-29 DIAGNOSIS — R30.0 DYSURIA: ICD-10-CM

## 2018-06-29 LAB
ALBUMIN UR-MCNC: NEGATIVE MG/DL
APPEARANCE UR: CLEAR
BILIRUB UR QL STRIP: NEGATIVE
COLOR UR AUTO: YELLOW
GLUCOSE UR STRIP-MCNC: NEGATIVE MG/DL
HGB UR QL STRIP: NEGATIVE
KETONES UR STRIP-MCNC: NEGATIVE MG/DL
LEUKOCYTE ESTERASE UR QL STRIP: ABNORMAL
NITRATE UR QL: NEGATIVE
NON-SQ EPI CELLS #/AREA URNS LPF: NORMAL /LPF
PH UR STRIP: 6.5 PH (ref 5–7)
RBC #/AREA URNS AUTO: NORMAL /HPF
SOURCE: ABNORMAL
SP GR UR STRIP: 1.01 (ref 1–1.03)
UROBILINOGEN UR STRIP-ACNC: 0.2 EU/DL (ref 0.2–1)
WBC #/AREA URNS AUTO: NORMAL /HPF

## 2018-06-29 PROCEDURE — 81001 URINALYSIS AUTO W/SCOPE: CPT | Performed by: FAMILY MEDICINE

## 2018-06-29 NOTE — LETTER
[facility]  [address]  [address]  [place] [id number] [phone]                                                                                                           Marylee Woods  [address]  [place] [facility] [place] [address]    July 2, 2018      Dear Marylee,     I wanted to let you know your recent test results - details of the results can be found below. Briefly:     1. Your results look great; everything is normal.  No infection.     Let me know if you have any questions about this, or other concerns.     Best,   Carolina Pulliam MD   Family Medicine   [facility]   [phone]         Results for orders placed or performed in visit on 06/29/18   -**UA reflex to Microscopic FUTURE anytime        Result                                            Value                         Ref Range                        Color Urine                                       Yellow                                                        Appearance Urine                                  Clear                                                          Glucose Urine                                     Negative                      NEG^Negative mg/dL              Bilirubin Urine                                   Negative                      NEG^Negative                    Ketones Urine                                     Negative                      NEG^Negative mg/dL              Specific Gravity Urine                            1.015                         1.003 - 1.035                    Blood Urine                                       Negative                      NEG^Negative                    pH Urine                                          6.5                           5.0 - 7.0 pH                    Protein Albumin Urine                             Negative                      NEG^Negative mg/dL              Urobilinogen Urine                                 0.2                           0.2 - 1.0 EU/dL                  Nitrite Urine                                     Negative                      NEG^Negative                    Leukocyte Esterase Urine                          Trace (A)                     NEG^Negative                    Source                                            Midstream Urine                                           -Urine Microscopic        Result                                            Value                         Ref Range                        WBC Urine                                         0 - 5                         OTO5^0 - 5 /HPF                  RBC Urine                                         O - 2                         OTO2^O - 2 /HPF                  Squamous Epithelial /LPF Urine                    Few                           FEW^Few /LPF

## 2018-06-29 NOTE — PROGRESS NOTES
Hi Team 3:  Please sent a lab result with the following note.  Thank you!    Dear Marylee,    I wanted to let you know your recent test results - details of the results can be found below. Briefly:    1. Your results look great; everything is normal.  No infection.    Let me know if you have any questions about this, or other concerns.    Best,  Carolina Pulliam MD  Family [facility]  [phone]         Results for orders placed or performed in visit on 06/29/18  -**UA reflex to Microscopic FUTURE anytime       Result                                            Value                         Ref Range                       Color Urine                                       Yellow                                                        Appearance Urine                                  Clear                                                         Glucose Urine                                     Negative                      NEG^Negative mg/dL              Bilirubin Urine                                   Negative                      NEG^Negative                    Ketones Urine                                     Negative                      NEG^Negative mg/dL              Specific Gravity Urine                            1.015                         1.003 - 1.035                   Blood Urine                                       Negative                      NEG^Negative                    pH Urine                                          6.5                           5.0 - 7.0 pH                    Protein Albumin Urine                             Negative                      NEG^Negative mg/dL              Urobilinogen Urine                                0.2                           0.2 - 1.0 EU/dL                 Nitrite Urine                                     Negative                      NEG^Negative                    Leukocyte Esterase Urine                           Trace (A)                     NEG^Negative                    Source                                            Midstream Urine                                          -Urine Microscopic       Result                                            Value                         Ref Range                       WBC Urine                                         0 - 5                         OTO5^0 - 5 /HPF                 RBC Urine                                         O - 2                         OTO2^O - 2 /HPF                 Squamous Epithelial /LPF Urine                    Few                           FEW^Few /LPF

## 2018-07-03 DIAGNOSIS — G35 MULTIPLE SCLEROSIS (H): Primary | ICD-10-CM

## 2018-07-03 DIAGNOSIS — R30.0 DYSURIA: ICD-10-CM

## 2018-09-04 ENCOUNTER — TELEPHONE (OUTPATIENT)
Dept: FAMILY MEDICINE | Facility: CLINIC | Age: 63
End: 2018-09-04

## 2018-09-04 DIAGNOSIS — R30.0 DYSURIA: ICD-10-CM

## 2018-09-04 LAB
ALBUMIN UR-MCNC: NEGATIVE MG/DL
APPEARANCE UR: ABNORMAL
BACTERIA #/AREA URNS HPF: ABNORMAL /HPF
BILIRUB UR QL STRIP: NEGATIVE
COLOR UR AUTO: YELLOW
GLUCOSE UR STRIP-MCNC: NEGATIVE MG/DL
HGB UR QL STRIP: ABNORMAL
KETONES UR STRIP-MCNC: NEGATIVE MG/DL
LEUKOCYTE ESTERASE UR QL STRIP: ABNORMAL
NITRATE UR QL: POSITIVE
NON-SQ EPI CELLS #/AREA URNS LPF: ABNORMAL /LPF
PH UR STRIP: 5 PH (ref 5–7)
RBC #/AREA URNS AUTO: ABNORMAL /HPF
SOURCE: ABNORMAL
SP GR UR STRIP: 1.02 (ref 1–1.03)
UROBILINOGEN UR STRIP-ACNC: 0.2 EU/DL (ref 0.2–1)
WBC #/AREA URNS AUTO: ABNORMAL /HPF

## 2018-09-04 PROCEDURE — 87086 URINE CULTURE/COLONY COUNT: CPT | Performed by: FAMILY MEDICINE

## 2018-09-04 PROCEDURE — 81001 URINALYSIS AUTO W/SCOPE: CPT | Performed by: PHYSICIAN ASSISTANT

## 2018-09-04 PROCEDURE — 87088 URINE BACTERIA CULTURE: CPT | Performed by: FAMILY MEDICINE

## 2018-09-04 PROCEDURE — 87186 SC STD MICRODIL/AGAR DIL: CPT | Performed by: FAMILY MEDICINE

## 2018-09-04 NOTE — PROGRESS NOTES
Pls have patient schedule phone visit for this today -  has Urinary Tract Infection again, would like to discuss.  Dr. Carolina Pulliam MD/[facility]

## 2018-09-04 NOTE — TELEPHONE ENCOUNTER
Pls have patient schedule phone visit for this today -  has Urinary Tract Infection again, would like to discuss.   Dr. Carolina Pulliam MD/[facility]     Message left on VM to call triage back.

## 2018-09-05 ENCOUNTER — VIRTUAL VISIT (OUTPATIENT)
Dept: FAMILY MEDICINE | Facility: CLINIC | Age: 63
End: 2018-09-05
Payer: MEDICARE

## 2018-09-05 DIAGNOSIS — N39.0 COMPLICATED URINARY TRACT INFECTION: Primary | ICD-10-CM

## 2018-09-05 DIAGNOSIS — Z29.89 NEED FOR PROPHYLAXIS AGAINST URINARY TRACT INFECTION: ICD-10-CM

## 2018-09-05 DIAGNOSIS — G35 MS (MULTIPLE SCLEROSIS) (H): ICD-10-CM

## 2018-09-05 LAB
BACTERIA SPEC CULT: ABNORMAL
SPECIMEN SOURCE: ABNORMAL

## 2018-09-05 PROCEDURE — 99441 ZZC PHYSICIAN TELEPHONE EVALUATION 5-10 MIN: CPT | Performed by: FAMILY MEDICINE

## 2018-09-05 RX ORDER — NITROFURANTOIN MACROCRYSTAL 100 MG
100 CAPSULE ORAL DAILY
Qty: 90 CAPSULE | Refills: 3 | Status: SHIPPED | OUTPATIENT
Start: 2018-09-05 | End: 2018-10-31

## 2018-09-05 RX ORDER — CIPROFLOXACIN 250 MG/1
250 TABLET, FILM COATED ORAL 2 TIMES DAILY
Qty: 10 TABLET | Refills: 0 | Status: SHIPPED | OUTPATIENT
Start: 2018-09-05 | End: 2018-09-10

## 2018-09-05 NOTE — PROGRESS NOTES
"Marylee Woods is a 63 year old female who is being evaluated via a telephone visit.      The patient has been notified of following:     \"This telephone visit will be conducted via a call between you and your physician/provider. We have found that certain health care needs can be provided without the need for a physical exam.  This service lets us provide the care you need with a short phone conversation.  If a prescription is necessary we can send it directly to your pharmacy.  If lab work is needed we can place an order for that and you can then stop by our lab to have the test done at a later time.    We will bill your insurance company for this service.  Please check with your medical insurance if this type of visit is covered. You may be responsible for the cost of this type of visit if insurance coverage is denied.  The typical cost is $30 (10min), $59 (11-20min) and $85 (21-30min).  Most often these visits are shorter than 10 minutes.    If during the course of the call the physician/provider feels a telephone visit is not appropriate, you will not be charged for this service.\"       Consent has been obtained for this service by care team member: {YES/NO:[id number]}.   See the scanned image in the medical record.    Marylee Woods complains of  No chief complaint on file.      I have reviewed and updated the patient's Past Medical History, Social History, Family History and Medication List.    ALLERGIES  Budesonide    .Perry BLUNT   (MA signature)    Additional provider notes: ***    Assessment/Plan:  No diagnosis found.    I have reviewed the note as documented above.  This accurately captures the substance of my conversation with the patient,  ***    Total time of call between patient and provider was *** minutes     SUBJECTIVE:   Marylee Woods is a 63 year old female who presents to clinic today for the following health issues:      Pt says still peeing freguently    {additional problems for provider to " "add:151457}    Problem list and histories reviewed & adjusted, as indicated.  Additional history: {NONE - AS DOCUMENTED:598369::\"as documented\"}    {HIST REVIEW/ LINKS 2:398418}    Reviewed and updated as needed this visit by clinical staff       Reviewed and updated as needed this visit by Provider         {PROVIDER CHARTING PREFERENCE:900077}    "

## 2018-09-05 NOTE — PROGRESS NOTES
"Marylee Woods is a 63 year old female who is being evaluated via a telephone visit.      The patient has been notified of following:     \"This telephone visit will be conducted via a call between you and your physician/provider. We have found that certain health care needs can be provided without the need for a physical exam.  This service lets us provide the care you need with a short phone conversation.  If a prescription is necessary we can send it directly to your pharmacy.  If lab work is needed we can place an order for that and you can then stop by our lab to have the test done at a later time.    We will bill your insurance company for this service.  Please check with your medical insurance if this type of visit is covered. You may be responsible for the cost of this type of visit if insurance coverage is denied.  The typical cost is $30 (10min), $59 (11-20min) and $85 (21-30min).  Most often these visits are shorter than 10 minutes.    If during the course of the call the physician/provider feels a telephone visit is not appropriate, you will not be charged for this service.\"       Consent has been obtained for this service by care team member: yes.   See the scanned image in the medical record.    Marylee Woods complains of  Results      I have reviewed and updated the patient's Past Medical History, Social History, Family History and Medication List.    ALLERGIES  Budesonide    KR (MA signature)    63 year old with MS.  Every month for 5 years has to check urine sample and blood work at home because of MS medication.  So last week, neurologist from Dr. Redman's office called and said urine looked like infection.    Last time had an infection it was a strep infection.  So on Monday started feeling twitchy in arms and legs.  So that's why brought urine in.  Been better since.    Last time in April - gave a sulfa medication and didn't work for her.    Then longer dose for sulfa also didn't work.    Went to " neurology clinic; they gave her cipro.  That worked.    No yeast infection.    Not taking macrodantin any longer for prophylaxis.  Too expensive.    Assessment/Plan:  (N39.0) Complicated urinary tract infection  (primary encounter diagnosis)  Plan: nitroFURantoin macrocrystal (MACRODANTIN) 100         MG capsule, ciprofloxacin (CIPRO) 250 MG tablet            (G35) Multiple sclerosis(H)    (Z29.8) Need for prophylaxis against urinary tract infection  Plan: nitroFURantoin macrocrystal (MACRODANTIN) 100         MG capsule    Plan:  --restart macrodantin for Urinary Tract Infection prophylaxis.  --Tx complicated Urinary Tract Infection with cipro.  --If has more than 6 infection next year on macrodantin, will refer to urology.    Time on phone: 9 minutes.  Dr. Carolina Pulliam MD/[facility]

## 2018-09-05 NOTE — MR AVS SNAPSHOT
After Visit Summary   9/5/2018    Marylee Woods    MRN: [id number]           Patient Information     Date Of Birth          1955        Visit Information        Provider Department      9/5/2018 12:45 PM Carolina Pulliam MD [facility]        Today's Diagnoses     Complicated urinary tract infection    -  1    MS (multiple sclerosis) (H)        Need for prophylaxis against urinary tract infection           Follow-ups after your visit        Your next 10 appointments already scheduled     Sep 05, 2018 12:45 PM CDT   Telephone Visit with Craolina Pulliam MD   [facility]    [address]  [address]  [place] [address]   [phone]           Note: this is not an onsite visit; there is no need to come to the facility.            Dec 03, 2018  8:00 AM CST   Level 7 with SH INFUSION CHAIR 6   [facility] ([facility])    [facility]  [address]  [facility] [address]   [phone]            Dec 04, 2018  8:00 AM CST   Level 7 with SH INFUSION CHAIR 4   [facility] ([facility])    [facility]  [address]  [facility] [address]   [phone]            Dec 05, 2018  8:00 AM CST   Level 7 with SH INFUSION CHAIR 4   [facility] ([facility])    [facility]  [address]  [facility] [address]   [phone]              Who to contact     If you have questions or need follow up information about today's clinic visit or your schedule please contact [facility] directly at [phone].  Normal or non-critical lab and imaging results will be communicated to you by MyChart, letter or phone within 4 business  days after the clinic has received the results. If you do not hear from us within 7 days, please contact the clinic through [employer] or phone. If you have a critical or abnormal lab result, we will notify you by phone as soon as possible.  Submit refill requests through [employer] or call your pharmacy and they will forward the refill request to us. Please allow 3 business days for your refill to be completed.          Additional Information About Your Visit        Care EveryWhere ID     This is your Care EveryWhere ID. This could be used by other organizations to access your [facility] medical records  [id number]        Your Vitals Were     Last Period                   (LMP Unknown)            Blood Pressure from Last 3 Encounters:   12/19/17 123/71   12/12/17 110/72   12/01/17 129/68    Weight from Last 3 Encounters:   12/12/17 130 lb (59 kg)   11/21/17 130 lb 4.8 oz (59.1 kg)   11/05/17 142 lb 3.2 oz (64.5 kg)              Today, you had the following     No orders found for display         Today's Medication Changes          These changes are accurate as of 9/5/18 12:12 PM.  If you have any questions, ask your nurse or doctor.               Start taking these medicines.        Dose/Directions    ciprofloxacin 250 MG tablet   Commonly known as:  CIPRO   Used for:  Complicated urinary tract infection        Dose:  250 mg   Take 1 tablet (250 mg) by mouth 2 times daily for 5 days   Quantity:  10 tablet   Refills:  0         These medicines have changed or have updated prescriptions.        Dose/Directions    gabapentin 300 MG capsule   Commonly known as:  NEURONTIN   This may have changed:  Another medication with the same name was removed. Continue taking this medication, and follow the directions you see here.   Used for:  Paresthesia        Dose:  900 mg   Take 3 capsules (900 mg) by mouth At Bedtime   Quantity:  90 capsule   Refills:  0       valACYclovir 500 MG tablet   Commonly known as:  VALTREX   This may have  changed:    - when to take this  - reasons to take this   Used for:  Herpes simplex disease        Dose:  500 mg   Take 1 tablet (500 mg) by mouth 2 times daily   Quantity:  12 tablet   Refills:  5         Stop taking these medicines if you haven't already. Please contact your care team if you have questions.     sulfamethoxazole-trimethoprim 800-160 MG per tablet   Commonly known as:  BACTRIM DS/SEPTRA DS                Where to get your medicines      These medications were sent to [facility]/pharmacy #[id number] - [place], [place] - [address]  [address] [address]     Phone:  [phone]     ciprofloxacin 250 MG tablet    nitroFURantoin macrocrystal 100 MG capsule                Primary Care Provider Office Phone # Fax #    Carolina Pulliam [phone] [address] [address]        Equal Access to Services     BRIE PALOMINO : Hadii gabriela calderon Soanmol, waaxda luqadaha, qaybta kaalmada ernesto, kaylen palma . So [facility] [phone].    ATENCIÓN: Si habla español, tiene a de oliveira disposición servicios gratuitos de asistencia lingüística. Mildred al [phone].    We comply with applicable federal civil rights laws and [place] laws. We do not discriminate on the basis of race, color, national origin, age, disability, sex, sexual orientation, or gender identity.            Thank you!     Thank you for choosing [facility]  for your care. Our goal is always to provide you with excellent care. Hearing back from our patients is one way we can continue to improve our services. Please take a few minutes to complete the written survey that you may receive in the mail after your visit with us. Thank you!             Your Updated Medication List - Protect others around you: Learn how to safely use, store and throw away your medicines at www.disposemymeds.org.          This list is accurate as of  9/5/18 12:12 PM.  Always use your most recent med list.                   Brand Name Dispense Instructions for use Diagnosis    acetaminophen 325 MG tablet    TYLENOL    100 tablet    Take 2 tablets (650 mg) by mouth every 4 hours as needed for mild pain or fever (greater than 101 degrees)    Multiple sclerosis exacerbation (H)       baclofen 10 MG tablet    LIORESAL    90 tablet    Take 2 tablets (20 mg) by mouth 2 times daily    Preop general physical exam, MS (multiple sclerosis) (H)       cholecalciferol 5000 units Caps capsule    vitamin D3     Take 1 capsule (5,000 Units) by mouth daily    MS (multiple sclerosis) (H)       ciprofloxacin 250 MG tablet    CIPRO    10 tablet    Take 1 tablet (250 mg) by mouth 2 times daily for 5 days    Complicated urinary tract infection       fluconazole 100 MG tablet    DIFLUCAN    15 tablet    Take 200 mg today, then 100 mg daily x 14 days total.    Candida esophagitis (H)       gabapentin 300 MG capsule    NEURONTIN    90 capsule    Take 3 capsules (900 mg) by mouth At Bedtime    Paresthesia       hydrochlorothiazide 12.5 MG Tabs tablet     90 tablet    Take 1 tablet (12.5 mg) by mouth daily    Essential hypertension, benign       multivitamin, therapeutic with minerals Tabs tablet      Take 1 tablet by mouth daily.    Anemia       NICOTROL 10 MG Inhaler   Generic drug:  nicotine      Inhale 1 Cartridge into the lungs daily as needed for smoking cessation        nitroFURantoin macrocrystal 100 MG capsule    MACRODANTIN    90 capsule    Take 1 capsule (100 mg) by mouth daily UTI prophylaxis    Complicated urinary tract infection, Need for prophylaxis against urinary tract infection       omeprazole 40 MG capsule    priLOSEC    90 capsule    TAKE ONE CAPSULE BY MOUTH EVERY DAY    Gastroesophageal reflux disease, esophagitis presence not specified       order for DME     1 Units    Equipment being ordered: medical lift chair    MS (multiple sclerosis) (H)       oxybutynin 10 MG  24 hr tablet    DITROPAN-XL    30 tablet    Take 1 tablet (10 mg) by mouth 2 times daily    Neurogenic bladder       polyethylene glycol Packet    MIRALAX/GLYCOLAX    30 packet    Take 17 g by mouth daily    Multiple sclerosis exacerbation (H)       POTASSIUM CHLORIDE PO      Take 99 mg by mouth daily        senna-docusate 8.6-50 MG per tablet    SENOKOT-S;PERICOLACE    100 tablet    Take 2 tablets by mouth 2 times daily    Multiple sclerosis exacerbation (H)       sertraline 100 MG tablet    ZOLOFT    135 tablet    TAKE 1.5 TABLETS BY MOUTH DAILY    Major depression in complete remission (H)       valACYclovir 500 MG tablet    VALTREX    12 tablet    Take 1 tablet (500 mg) by mouth 2 times daily    Herpes simplex disease

## 2018-09-11 ENCOUNTER — TRANSFERRED RECORDS (OUTPATIENT)
Dept: HEALTH INFORMATION MANAGEMENT | Facility: CLINIC | Age: 63
End: 2018-09-11

## 2018-09-11 LAB
CREAT SERPL-MCNC: 1.26 MG/DL (ref 0.57–1)
GFR SERPL CREATININE-BSD FRML MDRD: 45 ML/MIN/1.73M2
HEP C HIM: NORMAL
TSH SERPL-ACNC: NORMAL UIU/ML (ref 0.45–4.5)

## 2018-09-13 ENCOUNTER — TELEPHONE (OUTPATIENT)
Dept: FAMILY MEDICINE | Facility: CLINIC | Age: 63
End: 2018-09-13

## 2018-09-13 DIAGNOSIS — N39.0 URINARY TRACT INFECTION: ICD-10-CM

## 2018-09-13 NOTE — TELEPHONE ENCOUNTER
Reason for call:  Patient reporting a symptom    Symptom or request: uti    Duration (how long have symptoms been present): never went away    Have you been treated for this before? Yes    Additional comments: refused to make appointment    Phone Number patient can be reached at:  Home number on file [phone] (home)    Best Time:  asap    Can we leave a detailed message on this number:  YES    Call taken on 9/13/2018 at 2:21 PM by PANTERA SCHULTZ

## 2018-09-13 NOTE — TELEPHONE ENCOUNTER
Patient called reporting UTi    URINARY TRACT SYMPTOMS  Onset: since last appointment, not better    Description:   Painful urination (Dysuria): no   Blood in urine (Hematuria): no   Delay in urine (Hesitency): no   Nocturnal incontinence of urine. Soaks through a protective underwear and the pad nightly the last two nights.     Intensity: mild    Progression of Symptoms:  same    Accompanying Signs & Symptoms:  Fever/chills: YES, low grade fever  Flank pain no   Nausea and vomiting: no   Any vaginal symptoms: none  Abdominal/Pelvic Pain: no     History:   History of frequent UTI's: YES, due to medications from MS  History of kidney stones: no   Sexually Active: no   Possibility of pregnancy: No    Precipitating factors:   n/a    Therapies Tried and outcome: have completed the course of ciprofloxacin and symptoms have not resolved.     Routing to provider, the patient is wondering if prescribing another antibiotic would be appropriate or if another round of the cipro would help. Asking for one or the other.       References used: Telephone Triage Protocols for Nurses fifth edition       Please advise as appropriate with further recommendations as appropriate.    Trina De Jesus, RN  Triage RN  [facility]

## 2018-09-14 DIAGNOSIS — N39.0 URINARY TRACT INFECTION: ICD-10-CM

## 2018-09-14 LAB
ALBUMIN UR-MCNC: 30 MG/DL
APPEARANCE UR: CLEAR
BILIRUB UR QL STRIP: NEGATIVE
COLOR UR AUTO: YELLOW
GLUCOSE UR STRIP-MCNC: NEGATIVE MG/DL
HGB UR QL STRIP: NEGATIVE
KETONES UR STRIP-MCNC: NEGATIVE MG/DL
LEUKOCYTE ESTERASE UR QL STRIP: NEGATIVE
NITRATE UR QL: NEGATIVE
NON-SQ EPI CELLS #/AREA URNS LPF: NORMAL /LPF
PH UR STRIP: 6.5 PH (ref 5–7)
RBC #/AREA URNS AUTO: NORMAL /HPF
SOURCE: ABNORMAL
SP GR UR STRIP: 1.02 (ref 1–1.03)
UROBILINOGEN UR STRIP-ACNC: 0.2 EU/DL (ref 0.2–1)
WBC #/AREA URNS AUTO: NORMAL /HPF

## 2018-09-14 PROCEDURE — 81001 URINALYSIS AUTO W/SCOPE: CPT | Performed by: FAMILY MEDICINE

## 2018-09-14 NOTE — TELEPHONE ENCOUNTER
FYI-  Pt was advised to bring urine sample to clinic today. UA/UC order was placed. No further action needed unless a different order is needed.

## 2018-09-14 NOTE — PROGRESS NOTES
No infection now.  Can you let Marylee know?  And if continuing to have urinary problems, run it by neurologist?  Thanks,  Dr. Carolina Pulliam MD/[facility]

## 2018-09-14 NOTE — TELEPHONE ENCOUNTER
I'd like her to bring  In another urine sample, can she do this today?  Thanks,  Dr. Carolina Pulliam MD/[facility]

## 2018-09-26 NOTE — CONSULTS
"[facility]   PM&R CONSULT    Consulting Provider: Twila/Lisa  Reason for Consult: Assessment of rehabilitation   Location of Patient: 6A  Date of Encounter: 11/3/2017   Date of Admission: 10/31/2017        ASSESSMENT/RECOMMENDATIONS:    Ms. Marylee Woods is a right handed  62 year old yo female with a past medical history significant for MS, pancytopenia (thought to be medication related), GERD,  who presented with generalized weakness (LE>UE), confusion, and increased spasm in the setting of a B hemolytic strep infection being treated with Augmentin and with no new enhancing lesions on brain or cervical MRI.  She appears to have had a slow decline in function while at home over an extended period of time exacerbated by this recent infection.  She currently requires significant assistance for transfers, mobility and ADLs. Anticipate that she will have a more prolonged recovery period and would have difficulty tolerating the intensity of an ARU stay.  She is not safe to discharge home given the level of assistance she requires and lack of available support while  is at work.  Recommend TCU when medically stable for discharge.    HPI:    Patient is a  61 yo female with a past medical history significant for MS, pancytopenia (thought to be medication related), GERD,  who presented on 11/1 with symptoms of increased difficulty with transfers, increased spasms, and confusion.  She had been treated earlier in the monthwith a \"dose pack\" for another exacerbation that she states presented with similar LE weakness and blurry vision.  After completing her dose pack, she felt back to baseline for several days, then on 10/28 developed generalized weakness severe enough that she was unable to transfer, and had one fall during a transfer, as well as increased spasms primarily of LE and confusion/somnolence.  During her fall she injured her left leg and back, and reportedly had XRs " "at [facility] which were negative.     Symptoms were presumed to be due to a MS exacerbation.  MRI of brain and cervical spine did not show any enhancing lesion and throat culture showed B hemolytic strep and she was started on amoxicillin.  UCx and CXR were negative. She is receiving IV solumedrol (started 11/1) with plans for additional dosing tomorrow.  She reports noticing some improvement with the steroids, but continues to be worse than her baseline in terms of weakness, frequency of spasms, and headaches.         At baseline, her MS causes significant mobility impairment, spasticity, and neurogenic bladder.  She is on baclofen 20 mg BID for spasticity and oxybutynin for neurogenic bladder with reasonable control of her symptoms at baseline.          PREVIOUS LEVEL OF FUNCTION    She primarily used a wheelchair for mobility and would use a walker occasionally to walk short distances (~25 ft) if her  was available to walk with her when she used the walker due to fear of falling/poor balance.  She reports that she has been using the wheelchair more and more over time as she has been \"being lazy at home\" and not keeping up with her maintenance exercises.  She could transfers in/out of the wheelchair independently.  She was independent with ADLs at times, but on days when she felt fatigued or was feeling \"lazy\" she would require assistance (though she reported to PT that she had been needing bed baths).  Managed medications independently.   manages finances.        CURRENT FUNCTION   PT: rolling in bed with min A and strong use of UE on bedrail. Performed supine>sit with mod A x 2 for management of trunk and LEs with HOB slightly elevated. Pt requiring min A for sitting balance at EOB secondary to posterior lean. She performed stand-pivot transfer from bed>wc with min-mod A x 2. Significantly dec LE strength L>R. Pt also co of \"tingling\" below knee on L LE and above knee on R LE. Recommending " TCU.  OT: Consult not placed.  SLP: Consult not placed.     LIVING SITUATION/SUPPORT  Patient lives in [place] with her , Manuel, and a roommate, Chiki who lives upstairs.   works full time.  There are no other support people besides her  available to provide any additional care upon discharge.  Their house has a ramp to enter and then it is 2 levels with main bedroom and full bath on main level.      SOCIAL HISTORY  Social History     Social History     Marital status:      Spouse name: N/A     Number of children: N/A     Years of education: N/A     Social History Main Topics     Smoking status: Light Tobacco Smoker     Packs/day: 0.50     Types: Cigarettes     Smokeless tobacco: Never Used     Alcohol use 0.0 oz/week     0 Standard drinks or equivalent per week      Comment: 2/month     Drug use: No     Sexual activity: Yes     Partners: Male     Other Topics Concern     None     Social History Narrative                   Past Medical History:  Past Medical History:   Diagnosis Date     Gastro-oesophageal reflux disease      Multiple sclerosis (H)            Current Medications:  Current Facility-Administered Medications   Medication     [START ON 11/4/2017] methylPREDNISolone sodium succinate (solu-MEDROL) 1,000 mg in NaCl 0.9 % 250 mL intermittent infusion     amoxicillin (AMOXIL) capsule 500 mg     gabapentin (NEURONTIN) tablet 600 mg     gabapentin (NEURONTIN) capsule 900 mg     0.9% sodium chloride infusion     potassium chloride SA (K-DUR/KLOR-CON M) CR tablet 20-40 mEq     potassium chloride (KLOR-CON) Packet 20-40 mEq     potassium chloride 10 mEq in 100 mL sterile water intermittent infusion (premix)     potassium chloride 10 mEq in 100 mL intermittent infusion with 10 mg lidocaine     magnesium sulfate 4 g in 100 mL sterile water (premade)     acetaminophen (TYLENOL) tablet 650 mg     senna-docusate (SENOKOT-S;PERICOLACE) 8.6-50 MG per tablet 1 tablet    Or      senna-docusate (SENOKOT-S;PERICOLACE) 8.6-50 MG per tablet 2 tablet     magnesium hydroxide (MILK OF MAGNESIA) suspension 30 mL     sodium phosphate (FLEET ENEMA) 1 enema     ondansetron (ZOFRAN-ODT) ODT tab 4 mg    Or     ondansetron (ZOFRAN) injection 4 mg     teriflunomide (AUBAGIO) tablet 14 mg     baclofen (LIORESAL) tablet 10 mg     cholecalciferol (vitamin D3) capsule CAPS 5,000 Units     hydrochlorothiazide tablet 12.5 mg     sertraline (ZOLOFT) tablet 150 mg     oxybutynin (DITROPAN-XL) 24 hr tablet 10 mg     omeprazole (priLOSEC) CR capsule 40 mg     nitroFURantoin (macrocrystal-monohydrate) (MACROBID) capsule 100 mg     LORazepam (ATIVAN) tablet 1 mg     ibuprofen (ADVIL/MOTRIN) tablet 200-600 mg         Review of Systems:  Total of ten systems reviewed, pertinent positives and negatives as follows  Instability with standing and walking.    Feels generally fatigued  Reports generalized weakness.  Reports paresthesias of left distal leg.  Neurogenic bladder- symptoms at baseline.  No dysuria, abdominal pain, nausea.   No issues with bowels.  No chest pain. No cough or SOB.  Blurry vision.  Positive for headaches.  No photophobia.   No nausea, or abdominal pain.  Slept poorly last night  Back and left LE pain since fall.  Mood is good, denies any symptoms of depression.   Remainder of the review of the systems was negative.      Labs   Lab Results   Component Value Date    WBC 3.5 (L) 11/02/2017    HGB 9.9 (L) 11/02/2017    HCT 30.5 (L) 11/02/2017    MCV 87 11/02/2017     (L) 11/02/2017     Lab Results   Component Value Date     11/02/2017    POTASSIUM 3.9 11/02/2017    CHLORIDE 112 (H) 11/02/2017    CO2 25 11/02/2017     (H) 11/02/2017     Lab Results   Component Value Date    GFRESTIMATED 61 11/02/2017    GFRESTBLACK 73 11/02/2017     Lab Results   Component Value Date    AST 18 11/01/2017    ALT 15 11/01/2017    ALKPHOS 77 11/01/2017    BILITOTAL 0.5 11/01/2017    BILICONJ 0.0  11/11/2011    ALINA 12 11/11/2011     Lab Results   Component Value Date    INR 1.61 (H) 11/11/2011     Lab Results   Component Value Date    BUN 19 11/02/2017    CR 0.93 11/02/2017         ON EXAMINATION:  Vitals:    11/02/17 2300 11/03/17 0725 11/03/17 1000 11/03/17 1201   BP: 143/75 149/72  147/74   BP Location: Right arm Right arm  Right arm   Pulse:    82   Resp: 18 14  19   Temp: 96.3  F (35.7  C) 98.9  F (37.2  C)  96.7  F (35.9  C)   TempSrc: Oral Oral  Oral   SpO2: 95% 95% 100% 98%       Physical Exam:  Blood pressure 147/74, pulse 82, temperature 96.7  F (35.9  C), temperature source Oral, resp. rate 19, SpO2 98 %, not currently breastfeeding.    GEN: NAD, lying comfortably in bed.  She is alert, appropriate, cooperative  Speech is normal  Comprehension is intact  HEENT: NCAT  RESPIRATORY: Unlabored breathing  MSK: full passive ROM at all major joints of the bilaterally upper and lower extremities  No muscle atrophy noted  ABD: soft, non tender, pos BS  NEURO:   CRANIAL NERVES: Pupils equal, round. Extraocular movements full. Visual fields full. Face moves symmetrically. Tongue midline. Hearing intact to finger rubbing.    Sensation: sensation to light touch intact except for left lower extremity distally   Strength: deltoid 3 on R, 4 on L with giveway weakness, biceps/triceps 5 bilaterally, finger  and interossei 4 bilaterally, hip flexors and quads 2 bilaterally, dorsi and plantarflexors 4   Cognition: fund of knowledge and train of thought appropriate   Coordination: Dysmetria with finger-to-nose and FFM on left, no dysmetria on right.    Tone: No spasticity or clonus.   No spasms noted during H&P.  SKIN: no rashes or lesions noted.  Patient has PIV.  EXT: No edema.  Wearing compression stockings.  PSYCH: normal affect.  Mood is baseline with no signs of depression    Patient discussed with Dr. Gonzalez.      Rita COLON, personally examined and evaluated this patient on 11/3/17. DARLENE  discussed the patient with my resident Dr. Mattson and care team, and agree with the assessment and plan of care as documented in her note.    I personally reviewed the chart (vitals signs, medications, labs and imaging).     My key findings and key management decisions made by me: Marylee Woods is known to rehabilitation services from her previous stay in ARU in 05/2013 for tib/fib fracture. Since then, it appears she has had a gradual decline in function. At time of discharge from ARU, was ambulating with a walker. Is now primarily using a manual wheelchair and relies on significant other for assistance with some ADLs and most IADLs. She does have significant rehab needs at this time due to exacerbation of MS symptoms from current infection. Currently requiring significant assistance for transfers, mobility and ADLs. Anticipate that she will have a more prolonged recovery period and would have difficulty tolerating the intensity of an ARU stay.  She is not safe to discharge home given the level of assistance she requires and lack of available support while  is at work.  Recommend TCU when medically stable for discharge.         I spent a total of 70 minutes face-to-face and managing the care of Marylee Woods. Over 50% of my time on the unit was spent counseling the patient and coordinating care. See note for details.    26-Sep-2018 09:10

## 2018-09-27 ENCOUNTER — TRANSFERRED RECORDS (OUTPATIENT)
Dept: HEALTH INFORMATION MANAGEMENT | Facility: CLINIC | Age: 63
End: 2018-09-27

## 2018-10-22 DIAGNOSIS — K21.9 GASTROESOPHAGEAL REFLUX DISEASE, ESOPHAGITIS PRESENCE NOT SPECIFIED: ICD-10-CM

## 2018-10-22 RX ORDER — OMEPRAZOLE 40 MG/1
CAPSULE, DELAYED RELEASE ORAL
Qty: 90 CAPSULE | Refills: 2 | Status: SHIPPED | OUTPATIENT
Start: 2018-10-22 | End: 2019-08-06

## 2018-10-22 NOTE — TELEPHONE ENCOUNTER
"Requested Prescriptions   Pending Prescriptions Disp Refills     omeprazole (PRILOSEC) 40 MG capsule [Pharmacy Med Name: OMEPRAZOLE DR 40 MG CAPSULE] 90 capsule 0    Last Written Prescription Date:  07/27/2018  Last Fill Quantity: 90,  # refills: 0   Last office visit: 9/5/2018 with prescribing provider:  Dr. Pulliam   Future Office Visit:   Sig: TAKE 1 CAPSULE BY MOUTH EVERY DAY    PPI Protocol Passed    10/22/2018 11:48 AM       Passed - Not on Clopidogrel (unless Pantoprazole ordered)       Passed - No diagnosis of osteoporosis on record       Passed - Recent (12 mo) or future (30 days) visit within the authorizing provider's specialty    Patient had office visit in the last 12 months or has a visit in the next 30 days with authorizing provider or within the authorizing provider's specialty.  See \"Patient Info\" tab in inbasket, or \"Choose Columns\" in Meds & Orders section of the refill encounter.             Passed - Patient is age 18 or older       Passed - No active pregnacy on record       Passed - No positive pregnancy test in past 12 months        "

## 2018-10-31 ENCOUNTER — APPOINTMENT (OUTPATIENT)
Dept: GENERAL RADIOLOGY | Facility: CLINIC | Age: 63
DRG: 481 | End: 2018-10-31
Attending: EMERGENCY MEDICINE
Payer: MEDICARE

## 2018-10-31 ENCOUNTER — HOSPITAL ENCOUNTER (INPATIENT)
Facility: CLINIC | Age: 63
LOS: 6 days | Discharge: NURSING, REHAB OR LONG-TERM CARE | DRG: 481 | End: 2018-11-06
Attending: EMERGENCY MEDICINE | Admitting: ORTHOPAEDIC SURGERY
Payer: MEDICARE

## 2018-10-31 DIAGNOSIS — G35 MS (MULTIPLE SCLEROSIS) (H): Primary | ICD-10-CM

## 2018-10-31 DIAGNOSIS — S72.402S: ICD-10-CM

## 2018-10-31 DIAGNOSIS — S72.402A CLOSED FRACTURE OF DISTAL END OF LEFT FEMUR, UNSPECIFIED FRACTURE MORPHOLOGY, INITIAL ENCOUNTER (H): ICD-10-CM

## 2018-10-31 LAB
ANION GAP SERPL CALCULATED.3IONS-SCNC: 5 MMOL/L (ref 3–14)
BASOPHILS # BLD AUTO: 0 10E9/L (ref 0–0.2)
BASOPHILS NFR BLD AUTO: 0.3 %
BUN SERPL-MCNC: 22 MG/DL (ref 7–30)
CALCIUM SERPL-MCNC: 8.9 MG/DL (ref 8.5–10.1)
CHLORIDE SERPL-SCNC: 106 MMOL/L (ref 94–109)
CO2 SERPL-SCNC: 33 MMOL/L (ref 20–32)
CREAT SERPL-MCNC: 1 MG/DL (ref 0.52–1.04)
DIFFERENTIAL METHOD BLD: ABNORMAL
EOSINOPHIL # BLD AUTO: 0.1 10E9/L (ref 0–0.7)
EOSINOPHIL NFR BLD AUTO: 1.7 %
ERYTHROCYTE [DISTWIDTH] IN BLOOD BY AUTOMATED COUNT: 13.5 % (ref 10–15)
GFR SERPL CREATININE-BSD FRML MDRD: 56 ML/MIN/1.7M2
GLUCOSE SERPL-MCNC: 98 MG/DL (ref 70–99)
HCT VFR BLD AUTO: 30.7 % (ref 35–47)
HGB BLD-MCNC: 9.9 G/DL (ref 11.7–15.7)
IMM GRANULOCYTES # BLD: 0 10E9/L (ref 0–0.4)
IMM GRANULOCYTES NFR BLD: 0.1 %
INR PPP: 1.03 (ref 0.86–1.14)
LYMPHOCYTES # BLD AUTO: 0.5 10E9/L (ref 0.8–5.3)
LYMPHOCYTES NFR BLD AUTO: 6.7 %
MCH RBC QN AUTO: 30.5 PG (ref 26.5–33)
MCHC RBC AUTO-ENTMCNC: 32.2 G/DL (ref 31.5–36.5)
MCV RBC AUTO: 95 FL (ref 78–100)
MONOCYTES # BLD AUTO: 0.4 10E9/L (ref 0–1.3)
MONOCYTES NFR BLD AUTO: 5.3 %
NEUTROPHILS # BLD AUTO: 6.1 10E9/L (ref 1.6–8.3)
NEUTROPHILS NFR BLD AUTO: 85.9 %
NRBC # BLD AUTO: 0 10*3/UL
NRBC BLD AUTO-RTO: 0 /100
PLATELET # BLD AUTO: 124 10E9/L (ref 150–450)
POTASSIUM SERPL-SCNC: 3.7 MMOL/L (ref 3.4–5.3)
RBC # BLD AUTO: 3.25 10E12/L (ref 3.8–5.2)
SODIUM SERPL-SCNC: 144 MMOL/L (ref 133–144)
WBC # BLD AUTO: 7.1 10E9/L (ref 4–11)

## 2018-10-31 PROCEDURE — 86850 RBC ANTIBODY SCREEN: CPT | Performed by: EMERGENCY MEDICINE

## 2018-10-31 PROCEDURE — 85025 COMPLETE CBC W/AUTO DIFF WBC: CPT | Performed by: EMERGENCY MEDICINE

## 2018-10-31 PROCEDURE — 86900 BLOOD TYPING SEROLOGIC ABO: CPT | Performed by: EMERGENCY MEDICINE

## 2018-10-31 PROCEDURE — 80048 BASIC METABOLIC PNL TOTAL CA: CPT | Performed by: EMERGENCY MEDICINE

## 2018-10-31 PROCEDURE — 86923 COMPATIBILITY TEST ELECTRIC: CPT | Performed by: EMERGENCY MEDICINE

## 2018-10-31 PROCEDURE — 12000001 ZZH R&B MED SURG/OB UMMC

## 2018-10-31 PROCEDURE — 99285 EMERGENCY DEPT VISIT HI MDM: CPT | Mod: Z6 | Performed by: EMERGENCY MEDICINE

## 2018-10-31 PROCEDURE — 73552 X-RAY EXAM OF FEMUR 2/>: CPT | Mod: LT

## 2018-10-31 PROCEDURE — 73502 X-RAY EXAM HIP UNI 2-3 VIEWS: CPT

## 2018-10-31 PROCEDURE — 99285 EMERGENCY DEPT VISIT HI MDM: CPT | Mod: 25 | Performed by: EMERGENCY MEDICINE

## 2018-10-31 PROCEDURE — 86901 BLOOD TYPING SEROLOGIC RH(D): CPT | Performed by: EMERGENCY MEDICINE

## 2018-10-31 PROCEDURE — 73590 X-RAY EXAM OF LOWER LEG: CPT | Mod: LT

## 2018-10-31 PROCEDURE — 25000128 H RX IP 250 OP 636: Performed by: EMERGENCY MEDICINE

## 2018-10-31 PROCEDURE — 96376 TX/PRO/DX INJ SAME DRUG ADON: CPT | Performed by: EMERGENCY MEDICINE

## 2018-10-31 PROCEDURE — 73560 X-RAY EXAM OF KNEE 1 OR 2: CPT | Mod: LT

## 2018-10-31 PROCEDURE — 85610 PROTHROMBIN TIME: CPT | Performed by: EMERGENCY MEDICINE

## 2018-10-31 PROCEDURE — 96374 THER/PROPH/DIAG INJ IV PUSH: CPT | Performed by: EMERGENCY MEDICINE

## 2018-10-31 RX ORDER — HYDROMORPHONE HYDROCHLORIDE 1 MG/ML
0.5 INJECTION, SOLUTION INTRAMUSCULAR; INTRAVENOUS; SUBCUTANEOUS ONCE
Status: COMPLETED | OUTPATIENT
Start: 2018-10-31 | End: 2018-10-31

## 2018-10-31 RX ORDER — GABAPENTIN 300 MG/1
300 CAPSULE ORAL
Status: ON HOLD | COMMUNITY
End: 2018-11-06

## 2018-10-31 RX ORDER — NITROFURANTOIN 25; 75 MG/1; MG/1
100 CAPSULE ORAL DAILY
COMMUNITY
End: 2019-10-23

## 2018-10-31 RX ORDER — BACLOFEN 10 MG/1
10 TABLET ORAL
COMMUNITY
End: 2018-10-31

## 2018-10-31 RX ORDER — HYDROMORPHONE HYDROCHLORIDE 1 MG/ML
0.5 INJECTION, SOLUTION INTRAMUSCULAR; INTRAVENOUS; SUBCUTANEOUS
Status: DISCONTINUED | OUTPATIENT
Start: 2018-10-31 | End: 2018-11-04

## 2018-10-31 RX ORDER — BACLOFEN 10 MG/1
10 TABLET ORAL
Status: ON HOLD | COMMUNITY
End: 2018-11-05

## 2018-10-31 RX ADMIN — Medication 0.5 MG: at 19:08

## 2018-10-31 RX ADMIN — Medication 0.5 MG: at 17:45

## 2018-10-31 RX ADMIN — Medication 0.5 MG: at 22:07

## 2018-10-31 ASSESSMENT — ENCOUNTER SYMPTOMS
HEADACHES: 0
FEVER: 0
CONSTIPATION: 0
FATIGUE: 0
SHORTNESS OF BREATH: 0
CHILLS: 0
SEIZURES: 0
VOMITING: 0
SORE THROAT: 0
ABDOMINAL PAIN: 0
NAUSEA: 0
LIGHT-HEADEDNESS: 0
NUMBNESS: 0
DIARRHEA: 0

## 2018-10-31 NOTE — ED TRIAGE NOTES
Pt came to ED via van with , and needed to be hoisted onto [employer]. She presents with pain her left knee area from a fall earlier this morning in the bathroom. This is her third fall in 6 months.

## 2018-10-31 NOTE — ED PROVIDER NOTES
"  History     Chief Complaint   Patient presents with     Fall     10/31/18 today at 12:30 pm in bathroom     Leg Pain     Left     HPI  Marylee Woods is a 63 year old female who has a history of multiple sclerosis, left tib-fib fracture presenting with fall.  Patient was being held and her wheelchair when her brace got caught on something and she fell to the ground hitting her knee on the ground.  They did hear a pop.  Denies any numbness or tingling.  Not able to put weight on the leg.  No bleeding or lacerations.  Did not hit her head.  She is on blood thinners.  Denies any other injuries.  No chest pain or shortness of breath.    I have reviewed the Medications, Allergies, Past Medical and Surgical History, and Social History in the Epic system.    Review of Systems   Constitutional: Negative for chills, fatigue and fever.   HENT: Negative for congestion and sore throat.    Respiratory: Negative for shortness of breath.    Cardiovascular: Negative for chest pain.   Gastrointestinal: Negative for abdominal pain, constipation, diarrhea, nausea and vomiting.   Neurological: Negative for seizures, syncope, light-headedness, numbness and headaches.   All other systems reviewed and are negative.      Physical Exam   BP: 116/61  Pulse: 73  Temp: 97.9  F (36.6  C)  Height: 172.7 cm (5' 8\")  Weight: 56.7 kg (125 lb) (stata)  SpO2: 100 %      Physical Exam  Physical Exam   Constitutional: oriented to person, place, and time. appears well-developed and well-nourished.   HENT:   Head: Normocephalic and atraumatic.   Neck: Normal range of motion.   Pulmonary/Chest: Effort normal. No respiratory distress.   Cardiac: No murmurs, rubs, gallops. RRR.  Abdominal: Abdomen soft, nontender, nondistended. No rebound tenderness.  MSK: Tenderness to palpation along the left hip joint, left distal femur, left knee and left proximal tib-fib.  Compartments of the left lower extremity.  Peripheral pulses intact in the bilateral lower " extremity's.  Sensation grossly intact in the left lower extremity.  Small amount of swelling on the left patella.  Neurological: alert and oriented to person, place, and time.   Skin: Skin is warm and dry.   Psychiatric:  normal mood and affect.  behavior is normal. Thought content normal.     ED Course     ED Course     Procedures      Results for orders placed or performed during the hospital encounter of 10/31/18   XR Pelvis w Hip Left G/E 2 Views    Narrative    PELVIS, LEFT FEMUR, TIBIA AND FIBULA AND KNEE EIGHT VIEWS   10/31/2018  6:30 PM     HISTORY: Fall.    COMPARISON: None.      Impression    IMPRESSION:   1. Mildly comminuted and slightly impacted acute transverse fracture  of the distal left femoral diaphysis. There is minimal posterior  angulation about the fracture.  2. No other findings suspicious for acute fracture of the pelvis, left  femur, knee or tibia or fibula.  3. An intramedullary alexus and surgical screws are present in the left  tibia. No evidence of hardware failure. Partial visualization of a  right hip screw.  4. Diffuse osteopenia.   XR Femur Left 2 Views    Narrative    PELVIS, LEFT FEMUR, TIBIA AND FIBULA AND KNEE EIGHT VIEWS   10/31/2018  6:30 PM     HISTORY: Fall.    COMPARISON: None.      Impression    IMPRESSION:   1. Mildly comminuted and slightly impacted acute transverse fracture  of the distal left femoral diaphysis. There is minimal posterior  angulation about the fracture.  2. No other findings suspicious for acute fracture of the pelvis, left  femur, knee or tibia or fibula.  3. An intramedullary alexus and surgical screws are present in the left  tibia. No evidence of hardware failure. Partial visualization of a  right hip screw.  4. Diffuse osteopenia.   XR Knee Left 1/2 Views    Narrative    PELVIS, LEFT FEMUR, TIBIA AND FIBULA AND KNEE EIGHT VIEWS   10/31/2018  6:30 PM     HISTORY: Fall.    COMPARISON: None.      Impression    IMPRESSION:   1. Mildly comminuted and slightly  impacted acute transverse fracture  of the distal left femoral diaphysis. There is minimal posterior  angulation about the fracture.  2. No other findings suspicious for acute fracture of the pelvis, left  femur, knee or tibia or fibula.  3. An intramedullary alexus and surgical screws are present in the left  tibia. No evidence of hardware failure. Partial visualization of a  right hip screw.  4. Diffuse osteopenia.   XR Tibia & Fibula Left 2 Views    Narrative    PELVIS, LEFT FEMUR, TIBIA AND FIBULA AND KNEE EIGHT VIEWS   10/31/2018  6:30 PM     HISTORY: Fall.    COMPARISON: None.      Impression    IMPRESSION:   1. Mildly comminuted and slightly impacted acute transverse fracture  of the distal left femoral diaphysis. There is minimal posterior  angulation about the fracture.  2. No other findings suspicious for acute fracture of the pelvis, left  femur, knee or tibia or fibula.  3. An intramedullary alexus and surgical screws are present in the left  tibia. No evidence of hardware failure. Partial visualization of a  right hip screw.  4. Diffuse osteopenia.   CBC with platelets differential   Result Value Ref Range    WBC 7.1 4.0 - 11.0 10e9/L    RBC Count 3.25 (L) 3.8 - 5.2 10e12/L    Hemoglobin 9.9 (L) 11.7 - 15.7 g/dL    Hematocrit 30.7 (L) 35.0 - 47.0 %    MCV 95 78 - 100 fl    MCH 30.5 26.5 - 33.0 pg    MCHC 32.2 31.5 - 36.5 g/dL    RDW 13.5 10.0 - 15.0 %    Platelet Count 124 (L) 150 - 450 10e9/L    Diff Method Automated Method     % Neutrophils 85.9 %    % Lymphocytes 6.7 %    % Monocytes 5.3 %    % Eosinophils 1.7 %    % Basophils 0.3 %    % Immature Granulocytes 0.1 %    Nucleated RBCs 0 0 /100    Absolute Neutrophil 6.1 1.6 - 8.3 10e9/L    Absolute Lymphocytes 0.5 (L) 0.8 - 5.3 10e9/L    Absolute Monocytes 0.4 0.0 - 1.3 10e9/L    Absolute Eosinophils 0.1 0.0 - 0.7 10e9/L    Absolute Basophils 0.0 0.0 - 0.2 10e9/L    Abs Immature Granulocytes 0.0 0 - 0.4 10e9/L    Absolute Nucleated RBC 0.0    INR   Result  Value Ref Range    INR 1.03 0.86 - 1.14   ABO/Rh type and screen   Result Value Ref Range    ABO PENDING     Antibody Screen PENDING     Test Valid Only At          [facility]    Specimen Expires 11/03/2018          Assessments & Plan (with Medical Decision Making)   MDM  Patient presenting with fall.  No consideration for cardiac etiology due to clear onset and lack of associated symptoms.  Here the patient looks well.  Dilaudid given for pain.  X-rays show distal femur fracture.  Low suspicion for dislocation of the knee, she does describe her leg going off in a funny direction, I did discuss this further sounded like the lower leg was pointing laterally but not the knee joint itself, the foot was laterally pointing.  Neurovascularly intact extremity.  Orthopedic surgery to see the patient in the emergency department.  Dilaudid given for pain.    Re eval: Patient to be admitted to orthopedic surgery.    I have reviewed the nursing notes.    I have reviewed the findings, diagnosis, plan and need for follow up with the patient.    New Prescriptions    No medications on file       Final diagnoses:   Closed fracture of distal end of left femur, sequela       10/31/2018   [facility], EMERGENCY DEPARTMENT     Steffen Mckeon MD  10/31/18 [id number]

## 2018-11-01 ENCOUNTER — SURGERY (OUTPATIENT)
Age: 63
End: 2018-11-01

## 2018-11-01 ENCOUNTER — APPOINTMENT (OUTPATIENT)
Dept: GENERAL RADIOLOGY | Facility: CLINIC | Age: 63
DRG: 481 | End: 2018-11-01
Attending: ORTHOPAEDIC SURGERY
Payer: MEDICARE

## 2018-11-01 ENCOUNTER — ANESTHESIA EVENT (OUTPATIENT)
Dept: SURGERY | Facility: CLINIC | Age: 63
DRG: 481 | End: 2018-11-01
Payer: MEDICARE

## 2018-11-01 ENCOUNTER — ANESTHESIA (OUTPATIENT)
Dept: SURGERY | Facility: CLINIC | Age: 63
DRG: 481 | End: 2018-11-01
Payer: MEDICARE

## 2018-11-01 ENCOUNTER — APPOINTMENT (OUTPATIENT)
Dept: GENERAL RADIOLOGY | Facility: CLINIC | Age: 63
DRG: 481 | End: 2018-11-01
Payer: MEDICARE

## 2018-11-01 PROBLEM — S72.402A FRACTURE OF FEMUR, DISTAL, LEFT, CLOSED (H): Status: ACTIVE | Noted: 2018-11-01

## 2018-11-01 LAB
ABO + RH BLD: NORMAL
ABO + RH BLD: NORMAL
ALBUMIN UR-MCNC: NEGATIVE MG/DL
ANION GAP SERPL CALCULATED.3IONS-SCNC: 4 MMOL/L (ref 3–14)
APPEARANCE UR: CLEAR
BILIRUB UR QL STRIP: NEGATIVE
BLD GP AB SCN SERPL QL: NORMAL
BLD PROD TYP BPU: NORMAL
BLD PROD TYP BPU: NORMAL
BLD UNIT ID BPU: 0
BLOOD BANK CMNT PATIENT-IMP: NORMAL
BLOOD PRODUCT CODE: NORMAL
BPU ID: NORMAL
BUN SERPL-MCNC: 20 MG/DL (ref 7–30)
CALCIUM SERPL-MCNC: 8.7 MG/DL (ref 8.5–10.1)
CHLORIDE SERPL-SCNC: 107 MMOL/L (ref 94–109)
CO2 SERPL-SCNC: 32 MMOL/L (ref 20–32)
COLOR UR AUTO: YELLOW
CREAT SERPL-MCNC: 0.83 MG/DL (ref 0.52–1.04)
ERYTHROCYTE [DISTWIDTH] IN BLOOD BY AUTOMATED COUNT: 13.6 % (ref 10–15)
GFR SERPL CREATININE-BSD FRML MDRD: 70 ML/MIN/1.7M2
GLUCOSE SERPL-MCNC: 86 MG/DL (ref 70–99)
GLUCOSE UR STRIP-MCNC: NEGATIVE MG/DL
HCT VFR BLD AUTO: 30.3 % (ref 35–47)
HGB BLD-MCNC: 10.3 G/DL (ref 11.7–15.7)
HGB BLD-MCNC: 9.6 G/DL (ref 11.7–15.7)
HGB UR QL STRIP: NEGATIVE
HYALINE CASTS #/AREA URNS LPF: 3 /LPF (ref 0–2)
KETONES UR STRIP-MCNC: NEGATIVE MG/DL
LEUKOCYTE ESTERASE UR QL STRIP: NEGATIVE
MCH RBC QN AUTO: 29.9 PG (ref 26.5–33)
MCHC RBC AUTO-ENTMCNC: 31.7 G/DL (ref 31.5–36.5)
MCV RBC AUTO: 94 FL (ref 78–100)
MUCOUS THREADS #/AREA URNS LPF: PRESENT /LPF
NITRATE UR QL: NEGATIVE
NUM BPU REQUESTED: 1
PH UR STRIP: 5.5 PH (ref 5–7)
PLATELET # BLD AUTO: 105 10E9/L (ref 150–450)
POTASSIUM SERPL-SCNC: 3.8 MMOL/L (ref 3.4–5.3)
RBC # BLD AUTO: 3.21 10E12/L (ref 3.8–5.2)
RBC #/AREA URNS AUTO: 1 /HPF (ref 0–2)
SODIUM SERPL-SCNC: 143 MMOL/L (ref 133–144)
SOURCE: ABNORMAL
SP GR UR STRIP: 1.02 (ref 1–1.03)
SPECIMEN EXP DATE BLD: NORMAL
TRANSFUSION STATUS PATIENT QL: NORMAL
TRANSFUSION STATUS PATIENT QL: NORMAL
UROBILINOGEN UR STRIP-MCNC: NORMAL MG/DL (ref 0–2)
WBC # BLD AUTO: 4.3 10E9/L (ref 4–11)
WBC #/AREA URNS AUTO: 1 /HPF (ref 0–5)

## 2018-11-01 PROCEDURE — 25000128 H RX IP 250 OP 636: Performed by: REGISTERED NURSE

## 2018-11-01 PROCEDURE — 40000170 ZZH STATISTIC PRE-PROCEDURE ASSESSMENT II: Performed by: ORTHOPAEDIC SURGERY

## 2018-11-01 PROCEDURE — 71000014 ZZH RECOVERY PHASE 1 LEVEL 2 FIRST HR: Performed by: ORTHOPAEDIC SURGERY

## 2018-11-01 PROCEDURE — 0QHC04Z INSERTION OF INTERNAL FIXATION DEVICE INTO LEFT LOWER FEMUR, OPEN APPROACH: ICD-10-PCS | Performed by: ORTHOPAEDIC SURGERY

## 2018-11-01 PROCEDURE — C1769 GUIDE WIRE: HCPCS | Performed by: ORTHOPAEDIC SURGERY

## 2018-11-01 PROCEDURE — 93005 ELECTROCARDIOGRAM TRACING: CPT

## 2018-11-01 PROCEDURE — 25000125 ZZHC RX 250: Performed by: NURSE ANESTHETIST, CERTIFIED REGISTERED

## 2018-11-01 PROCEDURE — 87086 URINE CULTURE/COLONY COUNT: CPT | Performed by: INTERNAL MEDICINE

## 2018-11-01 PROCEDURE — 25000125 ZZHC RX 250: Performed by: ORTHOPAEDIC SURGERY

## 2018-11-01 PROCEDURE — 37000008 ZZH ANESTHESIA TECHNICAL FEE, 1ST 30 MIN: Performed by: ORTHOPAEDIC SURGERY

## 2018-11-01 PROCEDURE — 25000128 H RX IP 250 OP 636: Performed by: INTERNAL MEDICINE

## 2018-11-01 PROCEDURE — 37000009 ZZH ANESTHESIA TECHNICAL FEE, EACH ADDTL 15 MIN: Performed by: ORTHOPAEDIC SURGERY

## 2018-11-01 PROCEDURE — 25000128 H RX IP 250 OP 636: Performed by: EMERGENCY MEDICINE

## 2018-11-01 PROCEDURE — P9016 RBC LEUKOCYTES REDUCED: HCPCS | Performed by: EMERGENCY MEDICINE

## 2018-11-01 PROCEDURE — 12000001 ZZH R&B MED SURG/OB UMMC

## 2018-11-01 PROCEDURE — 25000128 H RX IP 250 OP 636: Performed by: NURSE ANESTHETIST, CERTIFIED REGISTERED

## 2018-11-01 PROCEDURE — 25000128 H RX IP 250 OP 636: Performed by: ORTHOPAEDIC SURGERY

## 2018-11-01 PROCEDURE — 25000125 ZZHC RX 250: Performed by: REGISTERED NURSE

## 2018-11-01 PROCEDURE — 85027 COMPLETE CBC AUTOMATED: CPT | Performed by: ORTHOPAEDIC SURGERY

## 2018-11-01 PROCEDURE — 99207 ZZC CONSULT E&M CHANGED TO SUBSEQUENT LEVEL: CPT | Performed by: INTERNAL MEDICINE

## 2018-11-01 PROCEDURE — 99232 SBSQ HOSP IP/OBS MODERATE 35: CPT | Performed by: INTERNAL MEDICINE

## 2018-11-01 PROCEDURE — 36000066 ZZH SURGERY LEVEL 4 W FLUORO 1ST 30 MIN - UMMC: Performed by: ORTHOPAEDIC SURGERY

## 2018-11-01 PROCEDURE — 36415 COLL VENOUS BLD VENIPUNCTURE: CPT | Performed by: ORTHOPAEDIC SURGERY

## 2018-11-01 PROCEDURE — 73552 X-RAY EXAM OF FEMUR 2/>: CPT | Mod: LT

## 2018-11-01 PROCEDURE — 25000125 ZZHC RX 250: Performed by: ANESTHESIOLOGY

## 2018-11-01 PROCEDURE — 25000565 ZZH ISOFLURANE, EA 15 MIN: Performed by: ORTHOPAEDIC SURGERY

## 2018-11-01 PROCEDURE — 36000064 ZZH SURGERY LEVEL 4 EA 15 ADDTL MIN - UMMC: Performed by: ORTHOPAEDIC SURGERY

## 2018-11-01 PROCEDURE — 80048 BASIC METABOLIC PNL TOTAL CA: CPT | Performed by: ORTHOPAEDIC SURGERY

## 2018-11-01 PROCEDURE — 27210794 ZZH OR GENERAL SUPPLY STERILE: Performed by: ORTHOPAEDIC SURGERY

## 2018-11-01 PROCEDURE — 25000128 H RX IP 250 OP 636: Performed by: ANESTHESIOLOGY

## 2018-11-01 PROCEDURE — 40000278 XR SURGERY CARM FLUORO LESS THAN 5 MIN: Mod: TC

## 2018-11-01 PROCEDURE — 81001 URINALYSIS AUTO W/SCOPE: CPT | Performed by: INTERNAL MEDICINE

## 2018-11-01 PROCEDURE — 93010 ELECTROCARDIOGRAM REPORT: CPT | Performed by: INTERNAL MEDICINE

## 2018-11-01 PROCEDURE — 85018 HEMOGLOBIN: CPT | Performed by: ANESTHESIOLOGY

## 2018-11-01 PROCEDURE — 25000132 ZZH RX MED GY IP 250 OP 250 PS 637: Mod: GY | Performed by: STUDENT IN AN ORGANIZED HEALTH CARE EDUCATION/TRAINING PROGRAM

## 2018-11-01 PROCEDURE — 27211024 ZZHC OR SUPPLY OTHER OPNP: Performed by: ORTHOPAEDIC SURGERY

## 2018-11-01 PROCEDURE — 25000128 H RX IP 250 OP 636: Performed by: STUDENT IN AN ORGANIZED HEALTH CARE EDUCATION/TRAINING PROGRAM

## 2018-11-01 PROCEDURE — A9270 NON-COVERED ITEM OR SERVICE: HCPCS | Mod: GY | Performed by: STUDENT IN AN ORGANIZED HEALTH CARE EDUCATION/TRAINING PROGRAM

## 2018-11-01 PROCEDURE — C1713 ANCHOR/SCREW BN/BN,TIS/BN: HCPCS | Performed by: ORTHOPAEDIC SURGERY

## 2018-11-01 PROCEDURE — P9041 ALBUMIN (HUMAN),5%, 50ML: HCPCS | Performed by: NURSE ANESTHETIST, CERTIFIED REGISTERED

## 2018-11-01 DEVICE — IMP SCR SYN 5.0X38MM VA LOCK ST T25 STARDRIVE 02.231.238: Type: IMPLANTABLE DEVICE | Site: HIP | Status: FUNCTIONAL

## 2018-11-01 DEVICE — IMP SCR SYN 5.0X60MM VA LOCK ST T25 STARDRIVE 02.231.260: Type: IMPLANTABLE DEVICE | Site: HIP | Status: FUNCTIONAL

## 2018-11-01 DEVICE — IMP SCR SYN 5.0X34MM VA LOCK ST T25 STARDRIVE 02.231.234: Type: IMPLANTABLE DEVICE | Site: HIP | Status: FUNCTIONAL

## 2018-11-01 DEVICE — IMP SCR SYN CORTEX 4.5X44MM SELF TAP SS 214.844: Type: IMPLANTABLE DEVICE | Site: HIP | Status: FUNCTIONAL

## 2018-11-01 DEVICE — IMPLANTABLE DEVICE: Type: IMPLANTABLE DEVICE | Site: HIP | Status: FUNCTIONAL

## 2018-11-01 DEVICE — IMPLANTABLE DEVICE: Type: IMPLANTABLE DEVICE | Site: FEMUR | Status: FUNCTIONAL

## 2018-11-01 DEVICE — IMP SCR SYN CORTEX 4.5X46MM SELF TAP SS 214.846: Type: IMPLANTABLE DEVICE | Site: HIP | Status: FUNCTIONAL

## 2018-11-01 DEVICE — IMP SCR SYN 5.0X70MM VA LOCK ST T25 STARDRIVE 02.231.270: Type: IMPLANTABLE DEVICE | Site: HIP | Status: FUNCTIONAL

## 2018-11-01 DEVICE — IMP SCR SYN 5.0X75MM VA LOCK ST T25 STARDRIVE 02.231.275: Type: IMPLANTABLE DEVICE | Site: HIP | Status: FUNCTIONAL

## 2018-11-01 RX ORDER — PROPOFOL 10 MG/ML
INJECTION, EMULSION INTRAVENOUS PRN
Status: DISCONTINUED | OUTPATIENT
Start: 2018-11-01 | End: 2018-11-01

## 2018-11-01 RX ORDER — METOCLOPRAMIDE HYDROCHLORIDE 5 MG/ML
10 INJECTION INTRAMUSCULAR; INTRAVENOUS EVERY 6 HOURS PRN
Status: DISCONTINUED | OUTPATIENT
Start: 2018-11-01 | End: 2018-11-06 | Stop reason: HOSPADM

## 2018-11-01 RX ORDER — CEFAZOLIN SODIUM 2 G/100ML
2 INJECTION, SOLUTION INTRAVENOUS EVERY 8 HOURS
Status: COMPLETED | OUTPATIENT
Start: 2018-11-02 | End: 2018-11-02

## 2018-11-01 RX ORDER — BUPIVACAINE HYDROCHLORIDE 2.5 MG/ML
INJECTION, SOLUTION INFILTRATION; PERINEURAL PRN
Status: DISCONTINUED | OUTPATIENT
Start: 2018-11-01 | End: 2018-11-01 | Stop reason: HOSPADM

## 2018-11-01 RX ORDER — EPHEDRINE SULFATE 50 MG/ML
INJECTION, SOLUTION INTRAMUSCULAR; INTRAVENOUS; SUBCUTANEOUS PRN
Status: DISCONTINUED | OUTPATIENT
Start: 2018-11-01 | End: 2018-11-01

## 2018-11-01 RX ORDER — CEFAZOLIN SODIUM 1 G/3ML
INJECTION, POWDER, FOR SOLUTION INTRAMUSCULAR; INTRAVENOUS PRN
Status: DISCONTINUED | OUTPATIENT
Start: 2018-11-01 | End: 2018-11-01

## 2018-11-01 RX ORDER — METOCLOPRAMIDE 10 MG/1
10 TABLET ORAL EVERY 6 HOURS PRN
Status: DISCONTINUED | OUTPATIENT
Start: 2018-11-01 | End: 2018-11-06 | Stop reason: HOSPADM

## 2018-11-01 RX ORDER — KETOROLAC TROMETHAMINE 15 MG/ML
15 INJECTION, SOLUTION INTRAMUSCULAR; INTRAVENOUS EVERY 6 HOURS
Status: DISCONTINUED | OUTPATIENT
Start: 2018-11-01 | End: 2018-11-02

## 2018-11-01 RX ORDER — SODIUM CHLORIDE, SODIUM LACTATE, POTASSIUM CHLORIDE, CALCIUM CHLORIDE 600; 310; 30; 20 MG/100ML; MG/100ML; MG/100ML; MG/100ML
INJECTION, SOLUTION INTRAVENOUS CONTINUOUS
Status: DISCONTINUED | OUTPATIENT
Start: 2018-11-01 | End: 2018-11-04

## 2018-11-01 RX ORDER — SODIUM CHLORIDE 9 MG/ML
INJECTION, SOLUTION INTRAVENOUS CONTINUOUS PRN
Status: DISCONTINUED | OUTPATIENT
Start: 2018-11-01 | End: 2018-11-01

## 2018-11-01 RX ORDER — ONDANSETRON 2 MG/ML
4 INJECTION INTRAMUSCULAR; INTRAVENOUS EVERY 30 MIN PRN
Status: DISCONTINUED | OUTPATIENT
Start: 2018-11-01 | End: 2018-11-01 | Stop reason: HOSPADM

## 2018-11-01 RX ORDER — FENTANYL CITRATE 50 UG/ML
INJECTION, SOLUTION INTRAMUSCULAR; INTRAVENOUS PRN
Status: DISCONTINUED | OUTPATIENT
Start: 2018-11-01 | End: 2018-11-01

## 2018-11-01 RX ORDER — ONDANSETRON 2 MG/ML
4 INJECTION INTRAMUSCULAR; INTRAVENOUS EVERY 6 HOURS PRN
Status: DISCONTINUED | OUTPATIENT
Start: 2018-11-01 | End: 2018-11-06 | Stop reason: HOSPADM

## 2018-11-01 RX ORDER — CEFAZOLIN SODIUM 2 G/100ML
2 INJECTION, SOLUTION INTRAVENOUS
Status: DISCONTINUED | OUTPATIENT
Start: 2018-11-01 | End: 2018-11-01 | Stop reason: HOSPADM

## 2018-11-01 RX ORDER — FENTANYL CITRATE 50 UG/ML
25 INJECTION, SOLUTION INTRAMUSCULAR; INTRAVENOUS
Status: DISCONTINUED | OUTPATIENT
Start: 2018-11-01 | End: 2018-11-01 | Stop reason: HOSPADM

## 2018-11-01 RX ORDER — OXYBUTYNIN CHLORIDE 10 MG/1
10 TABLET, EXTENDED RELEASE ORAL
Status: DISCONTINUED | OUTPATIENT
Start: 2018-11-01 | End: 2018-11-06 | Stop reason: HOSPADM

## 2018-11-01 RX ORDER — NALOXONE HYDROCHLORIDE 0.4 MG/ML
.1-.4 INJECTION, SOLUTION INTRAMUSCULAR; INTRAVENOUS; SUBCUTANEOUS
Status: ACTIVE | OUTPATIENT
Start: 2018-11-01 | End: 2018-11-02

## 2018-11-01 RX ORDER — OXYCODONE HYDROCHLORIDE 5 MG/1
5-10 TABLET ORAL
Status: DISCONTINUED | OUTPATIENT
Start: 2018-11-01 | End: 2018-11-04

## 2018-11-01 RX ORDER — ONDANSETRON 4 MG/1
4 TABLET, ORALLY DISINTEGRATING ORAL EVERY 6 HOURS PRN
Status: DISCONTINUED | OUTPATIENT
Start: 2018-11-01 | End: 2018-11-06 | Stop reason: HOSPADM

## 2018-11-01 RX ORDER — PROCHLORPERAZINE MALEATE 5 MG
10 TABLET ORAL EVERY 6 HOURS PRN
Status: DISCONTINUED | OUTPATIENT
Start: 2018-11-01 | End: 2018-11-06 | Stop reason: HOSPADM

## 2018-11-01 RX ORDER — GABAPENTIN 300 MG/1
600 CAPSULE ORAL 3 TIMES DAILY
Status: DISCONTINUED | OUTPATIENT
Start: 2018-11-01 | End: 2018-11-02

## 2018-11-01 RX ORDER — HYDROMORPHONE HYDROCHLORIDE 1 MG/ML
0.3 INJECTION, SOLUTION INTRAMUSCULAR; INTRAVENOUS; SUBCUTANEOUS EVERY 5 MIN PRN
Status: DISCONTINUED | OUTPATIENT
Start: 2018-11-01 | End: 2018-11-01 | Stop reason: HOSPADM

## 2018-11-01 RX ORDER — MULTIPLE VITAMINS W/ MINERALS TAB 9MG-400MCG
1 TAB ORAL DAILY
Status: DISCONTINUED | OUTPATIENT
Start: 2018-11-01 | End: 2018-11-06 | Stop reason: HOSPADM

## 2018-11-01 RX ORDER — LIDOCAINE HYDROCHLORIDE 20 MG/ML
INJECTION, SOLUTION INFILTRATION; PERINEURAL PRN
Status: DISCONTINUED | OUTPATIENT
Start: 2018-11-01 | End: 2018-11-01

## 2018-11-01 RX ORDER — SODIUM CHLORIDE, SODIUM LACTATE, POTASSIUM CHLORIDE, CALCIUM CHLORIDE 600; 310; 30; 20 MG/100ML; MG/100ML; MG/100ML; MG/100ML
INJECTION, SOLUTION INTRAVENOUS CONTINUOUS
Status: DISCONTINUED | OUTPATIENT
Start: 2018-11-01 | End: 2018-11-01 | Stop reason: HOSPADM

## 2018-11-01 RX ORDER — NALOXONE HYDROCHLORIDE 0.4 MG/ML
.1-.4 INJECTION, SOLUTION INTRAMUSCULAR; INTRAVENOUS; SUBCUTANEOUS
Status: DISCONTINUED | OUTPATIENT
Start: 2018-11-01 | End: 2018-11-06 | Stop reason: HOSPADM

## 2018-11-01 RX ORDER — CEFAZOLIN SODIUM 1 G/3ML
1 INJECTION, POWDER, FOR SOLUTION INTRAMUSCULAR; INTRAVENOUS SEE ADMIN INSTRUCTIONS
Status: DISCONTINUED | OUTPATIENT
Start: 2018-11-01 | End: 2018-11-01 | Stop reason: HOSPADM

## 2018-11-01 RX ORDER — ONDANSETRON 4 MG/1
4 TABLET, ORALLY DISINTEGRATING ORAL EVERY 30 MIN PRN
Status: DISCONTINUED | OUTPATIENT
Start: 2018-11-01 | End: 2018-11-01 | Stop reason: HOSPADM

## 2018-11-01 RX ORDER — AMOXICILLIN 250 MG
1 CAPSULE ORAL 2 TIMES DAILY
Status: DISCONTINUED | OUTPATIENT
Start: 2018-11-01 | End: 2018-11-02

## 2018-11-01 RX ORDER — NITROFURANTOIN 25; 75 MG/1; MG/1
100 CAPSULE ORAL DAILY
Status: DISCONTINUED | OUTPATIENT
Start: 2018-11-01 | End: 2018-11-06 | Stop reason: HOSPADM

## 2018-11-01 RX ORDER — BACLOFEN 20 MG/1
20 TABLET ORAL DAILY
Status: DISCONTINUED | OUTPATIENT
Start: 2018-11-01 | End: 2018-11-04

## 2018-11-01 RX ORDER — HYDROCHLOROTHIAZIDE 12.5 MG/1
12.5 TABLET ORAL DAILY
Status: DISCONTINUED | OUTPATIENT
Start: 2018-11-01 | End: 2018-11-01

## 2018-11-01 RX ORDER — PROCHLORPERAZINE 25 MG
25 SUPPOSITORY, RECTAL RECTAL EVERY 12 HOURS PRN
Status: DISCONTINUED | OUTPATIENT
Start: 2018-11-01 | End: 2018-11-06 | Stop reason: HOSPADM

## 2018-11-01 RX ORDER — HYDROMORPHONE HYDROCHLORIDE 1 MG/ML
.3-.5 INJECTION, SOLUTION INTRAMUSCULAR; INTRAVENOUS; SUBCUTANEOUS
Status: DISCONTINUED | OUTPATIENT
Start: 2018-11-01 | End: 2018-11-06

## 2018-11-01 RX ORDER — MAGNESIUM HYDROXIDE 1200 MG/15ML
LIQUID ORAL PRN
Status: DISCONTINUED | OUTPATIENT
Start: 2018-11-01 | End: 2018-11-01 | Stop reason: HOSPADM

## 2018-11-01 RX ORDER — ALBUMIN HUMAN 5 %
INTRAVENOUS SOLUTION INTRAVENOUS PRN
Status: DISCONTINUED | OUTPATIENT
Start: 2018-11-01 | End: 2018-11-01

## 2018-11-01 RX ORDER — ONDANSETRON 2 MG/ML
INJECTION INTRAMUSCULAR; INTRAVENOUS PRN
Status: DISCONTINUED | OUTPATIENT
Start: 2018-11-01 | End: 2018-11-01

## 2018-11-01 RX ORDER — POLYETHYLENE GLYCOL 3350 17 G/17G
17 POWDER, FOR SOLUTION ORAL DAILY
Status: DISCONTINUED | OUTPATIENT
Start: 2018-11-01 | End: 2018-11-02

## 2018-11-01 RX ORDER — ACETAMINOPHEN 325 MG/1
975 TABLET ORAL EVERY 8 HOURS
Status: DISCONTINUED | OUTPATIENT
Start: 2018-11-01 | End: 2018-11-06 | Stop reason: HOSPADM

## 2018-11-01 RX ORDER — BISACODYL 10 MG
10 SUPPOSITORY, RECTAL RECTAL DAILY PRN
Status: DISCONTINUED | OUTPATIENT
Start: 2018-11-01 | End: 2018-11-06 | Stop reason: HOSPADM

## 2018-11-01 RX ORDER — AMOXICILLIN 250 MG
2 CAPSULE ORAL 2 TIMES DAILY
Status: DISCONTINUED | OUTPATIENT
Start: 2018-11-01 | End: 2018-11-02

## 2018-11-01 RX ADMIN — Medication 0.5 MG: at 08:35

## 2018-11-01 RX ADMIN — OMEPRAZOLE 40 MG: 20 CAPSULE, DELAYED RELEASE ORAL at 08:34

## 2018-11-01 RX ADMIN — CEFAZOLIN 2 G: 1 INJECTION, POWDER, FOR SOLUTION INTRAMUSCULAR; INTRAVENOUS at 16:35

## 2018-11-01 RX ADMIN — ACETAMINOPHEN 975 MG: 325 TABLET, FILM COATED ORAL at 00:46

## 2018-11-01 RX ADMIN — FENTANYL CITRATE 25 MCG: 50 INJECTION, SOLUTION INTRAMUSCULAR; INTRAVENOUS at 17:12

## 2018-11-01 RX ADMIN — OXYCODONE HYDROCHLORIDE 5 MG: 5 TABLET ORAL at 01:10

## 2018-11-01 RX ADMIN — PHENYLEPHRINE HYDROCHLORIDE 0.3 MCG/KG/MIN: 10 INJECTION, SOLUTION INTRAMUSCULAR; INTRAVENOUS; SUBCUTANEOUS at 16:55

## 2018-11-01 RX ADMIN — SODIUM CHLORIDE: 9 INJECTION, SOLUTION INTRAVENOUS at 16:14

## 2018-11-01 RX ADMIN — BUPIVACAINE HYDROCHLORIDE 20 ML: 2.5 INJECTION, SOLUTION INFILTRATION; PERINEURAL at 18:18

## 2018-11-01 RX ADMIN — ALBUMIN HUMAN 250 ML: 50 SOLUTION INTRAVENOUS at 16:14

## 2018-11-01 RX ADMIN — OXYBUTYNIN CHLORIDE 10 MG: 5 TABLET, EXTENDED RELEASE ORAL at 08:34

## 2018-11-01 RX ADMIN — SERTRALINE HYDROCHLORIDE 150 MG: 100 TABLET ORAL at 08:36

## 2018-11-01 RX ADMIN — SODIUM CHLORIDE, POTASSIUM CHLORIDE, SODIUM LACTATE AND CALCIUM CHLORIDE: 600; 310; 30; 20 INJECTION, SOLUTION INTRAVENOUS at 18:35

## 2018-11-01 RX ADMIN — GABAPENTIN 600 MG: 300 CAPSULE ORAL at 10:19

## 2018-11-01 RX ADMIN — HYDROCHLOROTHIAZIDE 12.5 MG: 12.5 TABLET ORAL at 08:35

## 2018-11-01 RX ADMIN — FENTANYL CITRATE 50 MCG: 50 INJECTION, SOLUTION INTRAMUSCULAR; INTRAVENOUS at 16:06

## 2018-11-01 RX ADMIN — LIDOCAINE HYDROCHLORIDE 40 MG: 20 INJECTION, SOLUTION INFILTRATION; PERINEURAL at 16:06

## 2018-11-01 RX ADMIN — Medication 0.5 MG: at 00:16

## 2018-11-01 RX ADMIN — SODIUM CHLORIDE, POTASSIUM CHLORIDE, SODIUM LACTATE AND CALCIUM CHLORIDE: 600; 310; 30; 20 INJECTION, SOLUTION INTRAVENOUS at 16:00

## 2018-11-01 RX ADMIN — ACETAMINOPHEN 975 MG: 325 TABLET, FILM COATED ORAL at 08:35

## 2018-11-01 RX ADMIN — HYDROMORPHONE HYDROCHLORIDE 0.2 MG: 1 INJECTION, SOLUTION INTRAMUSCULAR; INTRAVENOUS; SUBCUTANEOUS at 18:08

## 2018-11-01 RX ADMIN — SODIUM CHLORIDE 800 ML: 900 IRRIGANT IRRIGATION at 18:12

## 2018-11-01 RX ADMIN — FENTANYL CITRATE 25 MCG: 50 INJECTION, SOLUTION INTRAMUSCULAR; INTRAVENOUS at 17:05

## 2018-11-01 RX ADMIN — SODIUM CHLORIDE, POTASSIUM CHLORIDE, SODIUM LACTATE AND CALCIUM CHLORIDE 500 ML: 600; 310; 30; 20 INJECTION, SOLUTION INTRAVENOUS at 11:28

## 2018-11-01 RX ADMIN — PROPOFOL 110 MG: 10 INJECTION, EMULSION INTRAVENOUS at 16:06

## 2018-11-01 RX ADMIN — SUGAMMADEX 120 MG: 100 INJECTION, SOLUTION INTRAVENOUS at 18:12

## 2018-11-01 RX ADMIN — ALBUMIN HUMAN 250 ML: 50 SOLUTION INTRAVENOUS at 16:54

## 2018-11-01 RX ADMIN — FENTANYL CITRATE 25 MCG: 50 INJECTION, SOLUTION INTRAMUSCULAR; INTRAVENOUS at 17:18

## 2018-11-01 RX ADMIN — ROCURONIUM BROMIDE 40 MG: 10 INJECTION INTRAVENOUS at 16:06

## 2018-11-01 RX ADMIN — Medication 10 MG: at 16:10

## 2018-11-01 RX ADMIN — NITROFURANTOIN (MONOHYDRATE/MACROCRYSTALS) 100 MG: 75; 25 CAPSULE ORAL at 08:34

## 2018-11-01 RX ADMIN — KETOROLAC TROMETHAMINE 15 MG: 15 INJECTION, SOLUTION INTRAMUSCULAR; INTRAVENOUS at 22:11

## 2018-11-01 RX ADMIN — ONDANSETRON 4 MG: 2 INJECTION INTRAMUSCULAR; INTRAVENOUS at 18:12

## 2018-11-01 RX ADMIN — Medication 0.5 MG: at 10:18

## 2018-11-01 RX ADMIN — BACLOFEN 20 MG: 20 TABLET ORAL at 08:35

## 2018-11-01 RX ADMIN — PHENYLEPHRINE HYDROCHLORIDE 50 MCG: 10 INJECTION, SOLUTION INTRAMUSCULAR; INTRAVENOUS; SUBCUTANEOUS at 16:42

## 2018-11-01 RX ADMIN — Medication 0.5 MG: at 14:18

## 2018-11-01 RX ADMIN — PHENYLEPHRINE HYDROCHLORIDE 100 MCG: 10 INJECTION, SOLUTION INTRAMUSCULAR; INTRAVENOUS; SUBCUTANEOUS at 18:01

## 2018-11-01 RX ADMIN — SODIUM CHLORIDE, POTASSIUM CHLORIDE, SODIUM LACTATE AND CALCIUM CHLORIDE: 600; 310; 30; 20 INJECTION, SOLUTION INTRAVENOUS at 00:46

## 2018-11-01 RX ADMIN — Medication 5 MG: at 16:41

## 2018-11-01 RX ADMIN — PHENYLEPHRINE HYDROCHLORIDE 100 MCG: 10 INJECTION, SOLUTION INTRAMUSCULAR; INTRAVENOUS; SUBCUTANEOUS at 16:09

## 2018-11-01 RX ADMIN — OXYCODONE HYDROCHLORIDE 10 MG: 5 TABLET ORAL at 05:19

## 2018-11-01 ASSESSMENT — ACTIVITIES OF DAILY LIVING (ADL)
ADLS_ACUITY_SCORE: 18
COGNITION: 0 - NO COGNITION ISSUES REPORTED
RETIRED_COMMUNICATION: 0-->UNDERSTANDS/COMMUNICATES WITHOUT DIFFICULTY
DRESS: 0-->INDEPENDENT
TOILETING: 3 - ASSISTIVE EQUIPMENT AND PERSON
BATHING: 0-->INDEPENDENT
TRANSFERRING: 1-->ASSISTIVE EQUIPMENT
FALL_HISTORY_WITHIN_LAST_SIX_MONTHS: YES
COMMUNICATION: 0 - UNDERSTANDS/COMMUNICATES WITHOUT DIFFICULTY
AMBULATION: 1-->ASSISTIVE EQUIPMENT
AMBULATION: 4 - COMPLETELY DEPENDENT
SWALLOWING: 0-->SWALLOWS FOODS/LIQUIDS WITHOUT DIFFICULTY
ADLS_ACUITY_SCORE: 24
EATING: 0 - INDEPENDENT
RETIRED_EATING: 0-->INDEPENDENT
TRANSFERRING: 3 - ASSISTIVE EQUIPMENT AND PERSON
DRESS: 2 - ASSISTIVE PERSON
ADLS_ACUITY_SCORE: 24
BATHING: 2 - ASSISTIVE PERSON
ADLS_ACUITY_SCORE: 18
NUMBER_OF_TIMES_PATIENT_HAS_FALLEN_WITHIN_LAST_SIX_MONTHS: 3
SWALLOWING: 0 - SWALLOWS FOODS/LIQUIDS WITHOUT DIFFICULTY
TOILETING: 0-->INDEPENDENT

## 2018-11-01 NOTE — PHARMACY
Prescriber Notification Note    The pharmacist has communicated with this patient's provider regarding a concern or therapy recommendation.    Notified Person: Dr. Luna  Date/Time of Notification: 11/01/18  Interaction: Face to face  Concern/Recommendation:  1. Gabapentin home dose is listed as 600 mg 3-4 times (med history was done by pharmacy), now ordered 600 mg bid.    2. Nicotrol inhaler is nonformulary, no alternate.      Comments/Additional Details:  1. Change Gabapentin dose to 600 mg tid.  2. Discontinue Nicotrol inhaler.     Tanna Kaba, PharmD, BCPS

## 2018-11-01 NOTE — BRIEF OP NOTE
[facility], [place]    Brief Operative Note    Pre-operative diagnosis: Left distal femur fracture  Post-operative diagnosis Same   Procedure: Procedure(s):  OPEN REDUCTION INTERNAL FIXATION LEFT FEMUR DISTAL  Surgeon: Surgeon(s) and Role:     * Jose Valadez MD - Primary     * Paco Gannon MD - Resident - Assisting  Anesthesia: General   Estimated blood loss: 150cc   Drains: None  Specimens: * No specimens in log *  Findings:   None.  Complications: None.  Implants: Synthes lateral distal locking place    1 unit pRBC intraoperatively     Ortho Primary  Activity: Up with assist until independent.  Weight bearing status: TTWB LLE x 6-8 weeks.  Pain management: Transition from IV to PO as tolerated.   Antibiotics: Ancef x 24 hours.  Diet: Begin with clear fluids and progress diet as tolerated.   DVT prophylaxis: Lovenox x 4 weeks and mechanical while in the hospital  Imaging: PACU.  Labs: Hgb POD#1-2.  Bracing/Splinting: Hinged knee brace unlocked from 0-40, ok to be off for hygiene.  Dressings: Keep clean, dry and intact x 3 days. Ortho to change prior to discharge   Drains: none   Physical Therapy/Occupational Therapy: Eval and treat.  Consults: Hospitalist .  Follow-up: Clinic with Dr. Valadez in 2 weeks   Disposition: Pending progress with therapies, pain control on orals, and medical stability, anticipate discharge to home vs TCU on POD #2-3.    Paco Gannon MD  Orthopaedic Surgery, PGY-4  Pager: [phone]

## 2018-11-01 NOTE — PLAN OF CARE
Problem: Patient Care Overview  Goal: Plan of Care/Patient Progress Review  Outcome: No Change    Pt arrived to unit around 00:00 and was assisted to bed via hovermat.  She seemed to be in intolerable pain and could barely answer any admission question.   Denied headache, chest pain, SOB. No numbness/tingling.  Endorses constant throb/ache in left leg/thigh. Managed with 0.5mg of PRN IV dilaudid and 10mg of PRN oxycodone.  Maintained on strict bedrest. NPO status since midnight (except for meds).  Spouse, Manuel, is aware of hospitalization. Spoke with him this morning and provided updates.  Able to make needs known. Possible surgery today. Continue POC.

## 2018-11-01 NOTE — H&P
[facility] Physicians, Orthopaedic Surgery History and Physical    Marylee Woods MRN# [id number]   Age: 63 year old YOB: 1955     Date of Admission:  10/31/2018          Assessment and Plan:   Assessment:  62 yo F with MS who sustained a left distal femur fracture on 10/31.     Plan:  - Admit to ortho  - Medicine consult for pre op optimization   - Possible OR on 11/1/2018 pending further discussion with the patient   - NPO after midnight  - Labs completed in the ED  - Bedrest  - Nonweightbearing left lower extremity  - Bedpan for avoid, Verduzco if this is too painful  - Imaging complete  - PT OT postop          History of Present Illness:   Patient was seen and examined by me. History, PMH, Meds, SH, complete ROS (10 organ systems) and PE reviewed with patient and prior medical records.      62 yo F with MS who sustained a mechanical fall earlier today.  She fell out of her wheelchair when she got caught.  She landed on her left knee and had immediate pain in that area.  She denies any other injury or any head trauma or loss of consciousness.  She is now unable to ambulate since the injury.  She denies any new numbness or tingling.  She does have some edema and wears lymphedema compression socks.           Past Medical History:     Past Medical History:   Diagnosis Date     Gastro-oesophageal reflux disease      Multiple sclerosis (H)              Past Surgical History:     Past Surgical History:   Procedure Laterality Date     C/SECTION, LOW TRANSVERSE  1992     COLONOSCOPY  2007     COLONOSCOPY N/A 1/26/2017    Procedure: COMBINED COLONOSCOPY, SINGLE OR MULTIPLE BIOPSY/POLYPECTOMY BY BIOPSY;  Surgeon: Mayo Adkins MD, MD;  Location: [facility]     ESOPHAGOSCOPY, GASTROSCOPY, DUODENOSCOPY (EGD), COMBINED  1/26/2017    Dr. Adkins [facility]     ESOPHAGOSCOPY, GASTROSCOPY, DUODENOSCOPY (EGD), COMBINED N/A 1/26/2017    Procedure: COMBINED ESOPHAGOSCOPY, GASTROSCOPY, DUODENOSCOPY (EGD), BIOPSY SINGLE OR MULTIPLE;  Surgeon:  Mayo Adkins MD, MD;  Location:  GI     HIP SURGERY  2009    femur ortho surgery     LAPAROSCOPIC CHOLECYSTECTOMY  6/24/2014    Procedure: LAPAROSCOPIC CHOLECYSTECTOMY;  Surgeon: Randy Bailey MD;  Location: [facility]     OPEN REDUCTION INTERNAL FIXATION RODDING INTRAMEDULLARY TIBIA  4/14/2013    Procedure: OPEN REDUCTION INTERNAL FIXATION RODDING INTRAMEDULLARY TIBIA;;  Surgeon: Sajan Cast MD;  Location: [facility] OR     ORTHOPEDIC SURGERY  2009    surgery right upper femur fx near hip             Social History:     Social History     Marital status:      Spouse name: N/A     Number of children: N/A     Years of education: N/A     Social History Main Topics     Smoking status: Current Every Day Smoker     Packs/day: 0.25     Types: Cigarettes     Smokeless tobacco: Former User     Quit date: 12/16/2017     Alcohol use 0.0 oz/week     0 Standard drinks or equivalent per week      Comment: 1/wk     Drug use: No     Sexual activity: Yes     Partners: Male          Family History:     Family History   Problem Relation Age of Onset     HEART DISEASE Maternal Grandmother      Cancer Maternal Grandfather      HEART DISEASE Paternal Grandfather      HEART DISEASE Mother      HEART DISEASE Father               Medications:     Current Facility-Administered Medications   Medication     HYDROmorphone (PF) (DILAUDID) injection 0.5 mg     Current Outpatient Prescriptions   Medication Sig     acetaminophen (TYLENOL) 325 MG tablet Take 2 tablets (650 mg) by mouth every 4 hours as needed for mild pain or fever (greater than 101 degrees)     baclofen (LIORESAL) 10 MG tablet Take 10 mg by mouth Take 2 tablets by mouth in the morning and 3 tablets by mouth in the evening.     cholecalciferol (VITAMIN D3) 5000 units CAPS capsule Take 5,000 Units by mouth daily     gabapentin (NEURONTIN) 300 MG capsule Take 300 mg by mouth Take 2 tablets by mouth 3-4 times daily.     hydrochlorothiazide 12.5 MG TABS tablet Take 1 tablet  "(12.5 mg) by mouth daily     multivitamin, therapeutic with minerals (THERA-VIT-M) TABS Take 1 tablet by mouth daily.     nitroFURantoin, macrocrystal-monohydrate, (MACROBID) 100 MG capsule Take 100 mg by mouth daily     omeprazole (PRILOSEC) 40 MG capsule TAKE 1 CAPSULE BY MOUTH EVERY DAY     oxybutynin (DITROPAN-XL) 10 MG 24 hr tablet Take 1 tablet (10 mg) by mouth 2 times daily     sertraline (ZOLOFT) 100 MG tablet TAKE 1.5 TABLETS BY MOUTH DAILY     valACYclovir (VALTREX) 500 MG tablet Take 1 tablet (500 mg) by mouth 2 times daily     NICOTROL 10 MG inhaler Inhale 1 Cartridge into the lungs daily as needed for smoking cessation      ORDER FOR DME Equipment being ordered: medical lift chair     polyethylene glycol (MIRALAX/GLYCOLAX) Packet Take 17 g by mouth daily             Allergies:      Allergies   Allergen Reactions     Amantadine      hallucinations     Budesonide             Review of Systems:   A comprehensive 10 point review of systems (constitutional, ENT, cardiac, peripheral vascular, respiratory, GI, , Musculoskeletal, skin, Neurological) was performed and found to be negative except as described in this note.           Physical Exam:   COMPLETE EXAMINATION:   VITAL SIGNS: /61  Pulse 79  Temp 97.6  F (36.4  C) (Oral)  Resp 16  Ht 1.727 m (5' 8\")  Wt 56.7 kg (125 lb)  LMP  (LMP Unknown)  SpO2 100%  BMI 19.01 kg/m2  GEN: well appearing, no distress  RESP: Non labored breathing  SKIN: dry, non-diaphoretic   LYMPHATIC:  no edema   NEURO:  alert and oriented    VASCULAR: Satisfactory perfusion of all extremities  MUSCULOSKELETAL:  Examination reveals tenderness and pain with any motion of the left lower extremity.  Range of motion of the knee deferred. She has no pain to palpation about the ankle.  Sensation is intact in all nerves addition of the foot.  She does have compression socks on and edema in the foot.  Foot is warm.  Pulses are not able to be felt through the compression socks " and edema.  Opted to leave compression socks in place given how high they went with fracture just proximal to the knee.  No pain with palpation about the right lower extremity or bilateral upper extremities.  No pain with palpation of right lower extremity or logroll the hip.          Data:   All pertinent laboratory data reviewed  All imaging studies reviewed by me.    Recent Labs   Lab Test  10/31/18   1859  01/31/18   1030  01/24/18   1020   11/10/11   0751   HGB  9.9*  10.2*  9.9*   < >  10.0*   CRP   --    --    --    --   51.7*   WBC  7.1  1.9*  1.8*   < >  19.4*    < > = values in this interval not displayed.     Imaging:   AP pelvis: no acute fractures identified, significant bone demineralization throughout, sliding hip screw on the right   Left femur: distal femur fracture, slight flexion on the lateral, minimally displaced on the AP; no proximal fractures   Left tib/fib: intramedullary alexus in place, no fractures   Left knee: distal femur fracture, slight flexion on the lateral, minimally displaced on the AP    Signed:    This consultation has been discussed with Dr. Valadez, Attending Physician.    Paco Gannon

## 2018-11-01 NOTE — CONSULTS
Consult Date:  11/01/2018      HISTORY OF PRESENT ILLNESS:  A pleasant 63-year-old female with multiple sclerosis, essential hypertension, history of recurrent urinary tract infection and gastroesophageal reflux disease admitted to the Orthopedic Service through the Emergency Department, where she presented with a left distal femur fracture.  We were asked by Dr. Jose Valadez from the Orthopedic Surgery Service to see patient for Internal Medicine consultation to assess and intervene with regard to medical comorbidities as well as to medically clear patient for anticipated surgical intervention later today.      Patient with multiple sclerosis formally diagnosed in 1993, manifested primarily by left-sided weakness in association with gait instability and record indication of neurogenic bladder.  In her usual state of health until 10/31, when her  was assisting her up from the toilet to her wheelchair when her leg gave out, apparently landing on her left knee with severe left lower extremity pain.  Left lower extremity brace in place for left-sided foot drop.  Presented to the Emergency Department, where patient was found to have stable hemodynamics, with blood pressure 116/61, heart rate 70s, temperature normal, O2 sat 100%.  X-rays of the pelvis, left femur, tibia and fibula and knee demonstrated mildly comminuted and slightly impacted acute transverse fracture of the distal left femoral diaphysis.  Minimal posterior angulation above the fracture.  Intramedullary alexus and surgical screws in the left tibia.  No evidence for hardware failure.  Lab data included basic metabolic profile with normal electrolytes, BUN 22, creatinine 1.00.  White count 7100, hemoglobin 9.9, platelet count 124,000.  MCV of 95.  No recent EKG on record, with last tracing 05/17/2016 demonstrating sinus bradycardia with minimal voltage criteria for LVH.      Admission to the Orthopedic Service with anticipated surgical  "intervention later this afternoon.  Presently, maintained on oxycodone 5-10 mg q.3 hours p.r.n. pain in addition to IV Dilaudid with adequate control.  No indication of prior opiate intolerance.  Did tolerate Percocet in the past, subsequent to surgery in 2009, indicating only issue was withdrawal symptoms with abrupt discontinuation.  No known coronary disease.  Limited functional mobility due to MS with inability to accurately assess functional capacity.      No prior problems with general anesthesia.  No history of abnormal bleeding tendency.  No history of blood clots, except for indication of a clot involving the right elbow in 2011, for which anticoagulation was not required.  Venous Doppler right upper extremity 11/17/2011 demonstrated occlusive thrombus in one of the paired brachial veins at the mid upper arm below PICC insertion site.  Occlusive thrombus in short segment of the right basilic vein (superficial).  Transfusion with prior orthopedic surgery 2014.  No reaction.  Steroids approximately 1 year ago, possibly with MS disease exacerbation in the setting of strep infection.  No recent steroids.  No ongoing upper respiratory infection or flu-like illness.      PAST MEDICAL HISTORY:   1.  Multiple sclerosis (as above) diagnosed 1993, manifested again predominantly by left-sided weakness.   2.  Gastroesophageal reflux disease, symptomatically controlled on omeprazole.   3.  Essential hypertension, on hydrochlorothiazide 12.5 mg daily, last dose on 10/31.   4.  History of recurrent \"bladder infection\", often asymptomatic.   5.  Bilateral lower extremity edema, left greater than right with lymphedema stockings at home.   6.  Left foot drop, for which patient wears a brace.   7.  Prior issue of pancytopenia, presumably related to prior treatment for multiple sclerosis.   8.  Prior issue of painful paresthesias, for which patient had been on gabapentin.      PAST SURGICAL HISTORY:   1.  Screening colonoscopy "  and 2017.   2.  Low transverse  .   3.  Upper GI endoscopy 2017.   4.  Presumed ORIF right femur fracture .   5.  Laparoscopic cholecystectomy 2014.     6.  ORIF tib-fib fracture on the left 2013.  Plate and screws in place.   7.  Thyroid biopsy (benign).      Without known heart disease, diabetes, asthma, intrinsic renal disease, peptic ulcer disease, hepatitis, thyroid disease, seizure, or tuberculosis.  The patient relates chronic anemia since childhood.  Details not available.      MEDICATION ALLERGIES:  INCLUDE AMANTADINE (HALLUCINATIONS) AND BUDESONIDE.      MEDICATIONS PRIOR TO ADMISSION:   1.  P.r.n. Tylenol.   2.  Baclofen 20 mg daily.   3.  Vitamin D3 5000 units daily.   4.  Neurontin 600 mg b.i.d.   5.  Hydrochlorothiazide 12.5 mg daily.   6.  Multivitamin daily.     7.  Nicotrol 1 cartridge inhalation p.r.n.    8.  Macrobid 100 mg daily, first dose today; indication urinary tract infection (per record).   9.  Omeprazole 40 mg daily.   10.  Oxybutynin 10 mg b.i.d.   11.  MiraLax 17 grams daily.   12.  Zoloft 250 mg daily.   13.  Valtrex 500 mg b.i.d., last dose 10/31/2018.      FAMILY HISTORY:  Maternal grandmother with heart disease.  Maternal grandfather with cancer.      HABITS:  Three cigarettes daily.  Partial glass of wine weekly.      SOCIAL HISTORY:  .  Three children.      REVIEW OF SYSTEMS:  Denies fevers or sweats.  No headache or dizziness.  No visual change.  Without chest discomfort, dyspnea, cough.  No nausea.  Occasional mild reflux.  No abdominal pain.  No diarrhea or constipation.  Last bowel movement 2 days ago.  No signs of GI blood loss.  Urinary incontinence, basis for Ditropan.  Denies issue with retention.  Has had dysuria lately.  Left knee arthralgia.  No rash.  Left-sided weakness related to MS.      OBJECTIVE:   GENERAL:  Adult female lying supine in bed in no distress.  Alert and oriented.  Pleasant demeanor.   VITAL SIGNS:  Last blood  pressure 123/77, heart rate 80s and regular, respirations nonlabored, O2 sat 97% on room air, temperature normal.   HEENT:  Extraocular movements full.  No nystagmus.  Pupils equal and reacting symmetrically.  Sclerae nonicteric.  Fundi deferred.  Oropharynx is clear.  Dentures are in place.  Mucosal membranes are dry.  Carotid upstroke brisk.  No bruits.  There is no thyromegaly or lymphadenopathy.   SKIN:  No rash (exposed areas).   CHEST:  Clear lung fields.   CARDIAC:  Regular without audible gallop or murmur.  No click, no jugular venous distention.   BREASTS:  Exam deferred.   ABDOMEN:  Nondistended, soft, nontender, without palpable organomegaly or mass.  Bowel sounds are present.  No epigastric or ileac bruits.   EXTREMITIES:  Distal lower extremities are well perfused.  There is 1+ pedal edema bilaterally.  Left leg in a brace/immobilizer.  Right leg:  No posterior calf or thigh tenderness/induration to suggest DVT.   PELVIC/RECTAL:  Deferred.   NEUROLOGIC:  Consistent with known multiple sclerosis.      LABORATORY DATA:  As above.      ASSESSMENT:  Pleasant 63-year-old female admitted with the followin.  Left distal femur fracture 10/31 subsequent to a fall with transfer from the toilet to wheelchair.  Indication that her leg gave out.  No suggestion of near herminia syncope.  No other noted injury.   2.  Advanced multiple sclerosis with predominant left-sided weakness.  Record indicates a neurogenic bladder; however, patient indicates more of an issue with incontinence.  Last treated with Lemtrada 5-day infusion last year, with 3-day infusion planned to begin on 2018.   3.  Essential hypertension, adequate control off a.m. hydrochlorothiazide.   4.  History of recurrent urinary tract infection.  Unclear if Macrobid for suppression.  Urine culture 2018 did demonstrate greater than 100,000 colonies of Escherichia coli.   5.  Neurovesical dysfunction per record, with more in the way of urge  incontinence, on oxybutynin.  Monitor for retention perioperatively.   6.  Bilateral lower extremity edema, likely related to venous insufficiency, left greater than right, on lymphedema stockings.   7.  Superficial clot, right upper extremity, in association with PICC line, 2011.   8.  History of pancytopenia attributed to prior treatment for multiple sclerosis.   9.  History of mood and sleep disorder, per record.   10.  Painful paresthesias on gabapentin.   11.  Status post laparoscopic cholecystectomy.   12.  Open reduction, internal fixation left tibia-fibula fracture 04/14/2013.   13.  Presumed ORIF right femur fracture, 2009.   14.  Thyroid biopsy.  Benign.   15.  Normochromic normocytic anemia, which on record review appears to be chronic with hemoglobin of 10.2 grams percent 01/31/2018.  Not on iron replacement.  Potentially related to chronic disease.   16.  No known coronary disease with limited functional capacity due to MS.      DISCUSSION:  The patient has no ongoing complaints or findings to contraindicate proceeding with surgery later today as planned.  No known coronary disease.  Again, unable to assess functional capacity due to limited mobility.  Has tolerated prior orthopedic procedures without cardiopulmonary compromise.  No specific opiate intolerances.      PLAN:   1.  Surgery as scheduled.   2.  Review meds/orders.   3.  Add EKG to screening labs.   4.  Add iron studies, B12, folate to labs with regard to anemia.   5.  Clarify indication for Macrobid.  Suspect relates to recent E. coli urinary tract infection.   6.  EKG.   7.  Incentive spirometry.   8.  Continue present opiates.   9.  Bowel meds.   10.  Deep venous thrombosis prophylaxis as per Orthopedics.   11.  Recheck UA, UC.   12.  Hold hydrochlorothiazide perioperatively.      Thank you for the consultation.  We will follow along with you.         DICKSON MAURICIO MD             D: 11/01/2018   T: 11/01/2018   MT: [id number]      Name:      WOODS, MARYLEE   MRN:      [id number]        Account:       [id number]   :      1955           Consult Date:  2018      Document: [id number]       cc: Carolina Pulliam MD

## 2018-11-01 NOTE — ANESTHESIA PREPROCEDURE EVALUATION
Anesthesia Pre-Procedure Evaluation    Patient: Marylee Woods   MRN:     [id number] Gender:   female   Age:    63 year old :      1955        Preoperative Diagnosis: See H & P   Procedure(s):  OPEN REDUCTION INTERNAL FIXATION FEMUR DISTAL     Past Medical History:   Diagnosis Date     Gastro-oesophageal reflux disease      Multiple sclerosis (H)       Past Surgical History:   Procedure Laterality Date     C/SECTION, LOW TRANSVERSE       COLONOSCOPY       COLONOSCOPY N/A 2017    Procedure: COMBINED COLONOSCOPY, SINGLE OR MULTIPLE BIOPSY/POLYPECTOMY BY BIOPSY;  Surgeon: Mayo Adkins MD, MD;  Location:  GI     ESOPHAGOSCOPY, GASTROSCOPY, DUODENOSCOPY (EGD), COMBINED  2017    Dr. Adkins [facility]     ESOPHAGOSCOPY, GASTROSCOPY, DUODENOSCOPY (EGD), COMBINED N/A 2017    Procedure: COMBINED ESOPHAGOSCOPY, GASTROSCOPY, DUODENOSCOPY (EGD), BIOPSY SINGLE OR MULTIPLE;  Surgeon: Mayo Adkins MD, MD;  Location:  GI     HIP SURGERY      femur ortho surgery     LAPAROSCOPIC CHOLECYSTECTOMY  2014    Procedure: LAPAROSCOPIC CHOLECYSTECTOMY;  Surgeon: Randy Bailey MD;  Location: [facility]     OPEN REDUCTION INTERNAL FIXATION RODDING INTRAMEDULLARY TIBIA  2013    Procedure: OPEN REDUCTION INTERNAL FIXATION RODDING INTRAMEDULLARY TIBIA;;  Surgeon: Sajan Cast MD;  Location:  OR     ORTHOPEDIC SURGERY      surgery right upper femur fx near hip          Anesthesia Evaluation     . Pt has had prior anesthetic. Type: General    No history of anesthetic complications          ROS/MED HX    ENT/Pulmonary:       Neurologic:     (+)Multiple Sclerosis     Cardiovascular:     (+) hypertension----. : . . . :. .       METS/Exercise Tolerance:     Hematologic:     (+) History of Transfusion no previous transfusion reaction -      Musculoskeletal:         GI/Hepatic:     (+) GERD Asymptomatic on medication,       Renal/Genitourinary:  - ROS Renal section negative       Endo:  - neg endo  "ROS       Psychiatric:     (+) psychiatric history depression and anxiety      Infectious Disease:         Malignancy:      - no malignancy   Other:                         PHYSICAL EXAM:   Mental Status/Neuro: A/A/O   Airway: Facies: Feasible  Mallampati: I  Mouth/Opening: Full  TM distance: > 6 cm  Neck ROM: Full   Respiratory: Auscultation: CTAB     Resp. Rate: Normal     Resp. Effort: Normal      CV: Rhythm: Regular  Rate: Age appropriate  Heart: Normal Sounds   Comments:                    Lab Results   Component Value Date    WBC 4.3 11/01/2018    HGB 9.6 (L) 11/01/2018    HCT 30.3 (L) 11/01/2018     (L) 11/01/2018    CRP 51.7 (H) 11/10/2011     11/01/2018    POTASSIUM 3.8 11/01/2018    CHLORIDE 107 11/01/2018    CO2 32 11/01/2018    BUN 20 11/01/2018    CR 0.83 11/01/2018    GLC 86 11/01/2018    JORDON 8.7 11/01/2018    PHOS 3.1 04/15/2013    MAG 1.9 11/17/2011    ALBUMIN 3.4 11/01/2017    PROTTOTAL 6.9 11/01/2017    ALT 15 11/01/2017    AST 18 11/01/2017    ALKPHOS 77 11/01/2017    BILITOTAL 0.5 11/01/2017    LIPASE 101 05/04/2016    AMYLASE 31 05/04/2016    ALINA 12 11/11/2011    PTT 36 11/11/2011    INR 1.03 10/31/2018    FIBR 364 11/11/2011    TSH 3.0.80 09/11/2018    T4 1.14 11/11/2011       Preop Vitals  BP Readings from Last 3 Encounters:   11/01/18 103/54   12/19/17 123/71   12/12/17 110/72    Pulse Readings from Last 3 Encounters:   11/01/18 70   12/19/17 83   12/12/17 83      Resp Readings from Last 3 Encounters:   11/01/18 16   12/12/17 16   12/01/17 16    SpO2 Readings from Last 3 Encounters:   11/01/18 96%   12/19/17 98%   12/12/17 95%      Temp Readings from Last 1 Encounters:   11/01/18 37.1  C (98.8  F) (Oral)    Ht Readings from Last 1 Encounters:   11/01/18 1.727 m (5' 8\")      Wt Readings from Last 1 Encounters:   10/31/18 56.7 kg (125 lb)    Estimated body mass index is 19.01 kg/(m^2) as calculated from the following:    Height as of this encounter: 1.727 m (5' 8\").    Weight as " of this encounter: 56.7 kg (125 lb).     LDA:  Peripheral IV 10/31/18 Right Upper forearm (Active)   Site Assessment WDL 11/1/2018 10:00 AM   Line Status Infusing 11/1/2018 10:00 AM   Phlebitis Scale 0-->no symptoms 11/1/2018 10:00 AM   Infiltration Scale 0 11/1/2018 10:00 AM   Infiltration Site Treatment Method  None 11/1/2018 10:00 AM   Extravasation? No 11/1/2018 10:00 AM   Number of days:1       Urethral Catheter Non-latex 16 fr (Active)   Tube Description Positional 11/1/2018  2:00 PM   Catheter Care Done 11/1/2018  2:00 PM   Collection Container Standard 11/1/2018  2:00 PM   Securement Method Leg strap 11/1/2018  2:00 PM   Rationale for Continued Use Other (Comment) 11/1/2018  2:00 PM   Urine Output 350 mL 11/1/2018  2:00 PM   Number of days:0            Assessment:   ASA SCORE: 3    NPO Status: > 6 hours since completed [employer]   Documentation: H&P complete; Preop Testing complete; Consents complete   Proceeding: Proceed without further delay  Tobacco Use:  NO Active use of Tobacco/UNKNOWN Tobacco use status     Plan:   Anes. Type:  General   Pre-Induction: Midazolam IV; Acetaminophen PO   Induction:  IV (Standard)   Airway: Oral ETT   Access/Monitoring: PIV   Maintenance: Balanced; Propofol   Emergence: Procedure Site   Logistics: Observation/Admission     Postop Pain/Sedation Strategy:  Standard-Options: Opioids PRN     PONV Management:  Adult Risk Factors: Female, Non-Smoker, Postop Opioids  Prevention: Ondansetron; Dexamethasone     CONSENT: Direct conversation   Plan and risks discussed with: Patient   Blood Products: Consented (ALL Blood Products)                         Raúl Kinney MD, MD

## 2018-11-01 NOTE — PLAN OF CARE
Problem: Patient Care Overview  Goal: Plan of Care/Patient Progress Review  Outcome: Improving  Focus: Physio  D: NPO. Sips of water with medications this am. Lots of pain when moving. BP soft 84/51 HR 67. Bolus of 500 ml of lactated ringers given. With recheck bp 103/54 HR 70. Unable to tolerated bedpan. BP  Verduzco placed per md note do do so. Lungs are clear. Bowel sounds positive. Last bm 10-31-18. Left sided weakness with gait instability secondary to MS. I: Dilaudid 0.5mg given times two thus far into shift. P: Surgery this afternoon. May transfer to  if bed available on unit after surgery.

## 2018-11-01 NOTE — ED NOTES
[facility], [place]   ED Nurse to Floor Handoff     Marylee Woods is a 63 year old female who speaks English and lives with a spouse,  in a home  They arrived in the ED by car from home    ED Chief Complaint: Fall (10/31/18 today at 12:30 pm in bathroom) and Leg Pain (Left)    ED Dx;   Final diagnoses:   Closed fracture of distal end of left femur, sequela         Needed?: No    Allergies:   Allergies   Allergen Reactions     Amantadine      hallucinations     Budesonide    .  Past Medical Hx:   Past Medical History:   Diagnosis Date     Gastro-oesophageal reflux disease      Multiple sclerosis (H)       Baseline Mental status: WDL  Current Mental Status changes: at basesline    Infection present or suspected this encounter: no  Sepsis suspected: No  Isolation type: No active isolations     Activity level - Baseline/Home:  Stand with Assist   Due to her MS  Activity Level - Current:   Total Care    Bariatric equipment needed?: No    In the ED these meds were given:   Medications   HYDROmorphone (PF) (DILAUDID) injection 0.5 mg (0.5 mg Intravenous Given 10/31/18 1745)   HYDROmorphone (PF) (DILAUDID) injection 0.5 mg (0.5 mg Intravenous Given 10/31/18 1908)       Drips running?  No    Home pump  No    Current LDAs  Peripheral IV 10/31/18 Right Upper forearm (Active)   Line Status Saline locked 10/31/2018  7:09 PM   Number of days:0       Labs results:   Labs Ordered and Resulted from Time of ED Arrival Up to the Time of Departure from the ED   CBC WITH PLATELETS DIFFERENTIAL - Abnormal; Notable for the following:        Result Value    RBC Count 3.25 (*)     Hemoglobin 9.9 (*)     Hematocrit 30.7 (*)     Platelet Count 124 (*)     Absolute Lymphocytes 0.5 (*)     All other components within normal limits   BASIC METABOLIC PANEL - Abnormal; Notable for the following:     Carbon Dioxide 33 (*)     GFR Estimate 56 (*)     All other components within normal limits   INR   ABO/RH  TYPE AND SCREEN       Imaging Studies:   Recent Results (from the past 24 hour(s))   XR Pelvis w Hip Left G/E 2 Views    Narrative    PELVIS, LEFT FEMUR, TIBIA AND FIBULA AND KNEE EIGHT VIEWS   10/31/2018  6:30 PM     HISTORY: Fall.    COMPARISON: None.      Impression    IMPRESSION:   1. Mildly comminuted and slightly impacted acute transverse fracture  of the distal left femoral diaphysis. There is minimal posterior  angulation about the fracture.  2. No other findings suspicious for acute fracture of the pelvis, left  femur, knee or tibia or fibula.  3. An intramedullary alexus and surgical screws are present in the left  tibia. No evidence of hardware failure. Partial visualization of a  right hip screw.  4. Diffuse osteopenia.   XR Knee Left 1/2 Views    Narrative    PELVIS, LEFT FEMUR, TIBIA AND FIBULA AND KNEE EIGHT VIEWS   10/31/2018  6:30 PM     HISTORY: Fall.    COMPARISON: None.      Impression    IMPRESSION:   1. Mildly comminuted and slightly impacted acute transverse fracture  of the distal left femoral diaphysis. There is minimal posterior  angulation about the fracture.  2. No other findings suspicious for acute fracture of the pelvis, left  femur, knee or tibia or fibula.  3. An intramedullary alexus and surgical screws are present in the left  tibia. No evidence of hardware failure. Partial visualization of a  right hip screw.  4. Diffuse osteopenia.   XR Tibia & Fibula Left 2 Views    Narrative    PELVIS, LEFT FEMUR, TIBIA AND FIBULA AND KNEE EIGHT VIEWS   10/31/2018  6:30 PM     HISTORY: Fall.    COMPARISON: None.      Impression    IMPRESSION:   1. Mildly comminuted and slightly impacted acute transverse fracture  of the distal left femoral diaphysis. There is minimal posterior  angulation about the fracture.  2. No other findings suspicious for acute fracture of the pelvis, left  femur, knee or tibia or fibula.  3. An intramedullary alexus and surgical screws are present in the left  tibia. No evidence  "of hardware failure. Partial visualization of a  right hip screw.  4. Diffuse osteopenia.   XR Femur Left 2 Views    Narrative    PELVIS, LEFT FEMUR, TIBIA AND FIBULA AND KNEE EIGHT VIEWS   10/31/2018  6:30 PM     HISTORY: Fall.    COMPARISON: None.      Impression    IMPRESSION:   1. Mildly comminuted and slightly impacted acute transverse fracture  of the distal left femoral diaphysis. There is minimal posterior  angulation about the fracture.  2. No other findings suspicious for acute fracture of the pelvis, left  femur, knee or tibia or fibula.  3. An intramedullary alexus and surgical screws are present in the left  tibia. No evidence of hardware failure. Partial visualization of a  right hip screw.  4. Diffuse osteopenia.       Recent vital signs:   /61  Pulse 73  Temp 97.9  F (36.6  C) (Oral)  Ht 1.727 m (5' 8\")  Wt 56.7 kg (125 lb)  LMP  (LMP Unknown)  SpO2 99%  BMI 19.01 kg/m2    Cardiac Rhythm: Other  Pt needs tele? No  Skin/wound Issues: None    Code Status: Full Code    Pain control: good    Nausea control: pt had none    Abnormal labs/tests/findings requiring intervention: femer fracture    Family present during ED course? Yes   Family Comments/Social Situation comments:     Tasks needing completion: None    Yuan Hall, RN  [phone] [facility]  [phone] [facility] ED        "

## 2018-11-01 NOTE — PHARMACY-ADMISSION MEDICATION HISTORY
Admission medication history for the October 31, 2018 admission is complete.     Interview sources:  Patient, Pharmacy ([facility])    Reliability of source: Good    Medication compliance: Good    Preferred Outpatient Pharmacy: [facility]    Changes made to PTA medication list (reason)  Added: None  Deleted: fluconazole 100mg tablet; Take 2 tablets today, then 1 tablet by mouth for 14 days (course ended)  Potassium chloride; take 99mg by mouth daily (doesn't take)  Senna-docusate 8.6-50mg tablet; Take 2 tablet by mouth twice daily (doesn't take).  Changed: baclofen 10mg tablet take 2 tablet by mouth twice daily changed to 2 tablets by mouth in the morning and 3 tablets by mouth in the evening.  Gabapentin 300mg capsule take 3 capsules by mouth at bedtime changed to take 2 capsules by mouth 3-4 times daily.    Additional medication history information:   Patient is trying to quit smoking again and plans to re-start her nicotrol inhaler.    Prior to Admission Medication List:  Prior to Admission medications    Medication Sig Last Dose Taking? Auth Provider   acetaminophen (TYLENOL) 325 MG tablet Take 2 tablets (650 mg) by mouth every 4 hours as needed for mild pain or fever (greater than 101 degrees) Unknown Yes Pretty Carter MD   baclofen (LIORESAL) 10 MG tablet Take 10 mg by mouth Take 2 tablets by mouth in the morning and 3 tablets by mouth in the evening.  Yes Unknown, Entered By History   cholecalciferol (VITAMIN D3) 5000 units CAPS capsule Take 5,000 Units by mouth daily 10/31/2018 at AM Yes Unknown, Entered By History   gabapentin (NEURONTIN) 300 MG capsule Take 2 capsules (600mg) by mouth 3-4 times daily. 10/31/2018 at Unknown time Yes Unknown, Entered By History   hydrochlorothiazide 12.5 MG TABS tablet Take 1 tablet (12.5 mg) by mouth daily 10/31/2018 at AM Yes Carolina Pulliam MD   multivitamin, therapeutic with minerals (THERA-VIT-M) TABS Take 1 tablet by mouth daily. 10/31/2018 at AM Yes Ethel Miranda  SWAPNIL Mae CNP   nitroFURantoin, macrocrystal-monohydrate, (MACROBID) 100 MG capsule Take 100 mg by mouth daily 10/31/2018 at AM Yes Unknown, Entered By History   omeprazole (PRILOSEC) 40 MG capsule TAKE 1 CAPSULE BY MOUTH EVERY DAY 10/31/2018 at AM Yes Carolina Pulliam MD   oxybutynin (DITROPAN-XL) 10 MG 24 hr tablet Take 1 tablet (10 mg) by mouth 2 times daily 10/31/2018 at AM Yes Yair De Leon MD   sertraline (ZOLOFT) 100 MG tablet TAKE 1.5 TABLETS BY MOUTH DAILY 10/30/2018 at PM Yes Carolina Pulliam MD   valACYclovir (VALTREX) 500 MG tablet Take 1 tablet (500 mg) by mouth 2 times daily 10/31/2018 at AM Yes Carolina Pulliam MD   NICOTROL 10 MG inhaler Inhale 1 Cartridge into the lungs daily as needed for smoking cessation  Unknown at Unknown time  Reported, Patient   ORDER FOR DME Equipment being ordered: medical lift chair   Malorie Bates MD   polyethylene glycol (MIRALAX/GLYCOLAX) Packet Take 17 g by mouth daily Unknown at Unknown time  Pretty Carter MD       Time spent: 30 minutes    Medication history completed by:   Rich Pulliam, Pharmacy Intern

## 2018-11-01 NOTE — PROGRESS NOTES
"Orthopedics Daily Progress Note    S: no acute events overnight, pain controlled, minimal at rest, NPO, voiding, no numbness/tinglings, chest pain/shortness of breath, or fever/chills. Discussed risks benefits and alternatives to op vs non op treatment; plan for OR today    O: /77 (BP Location: Left arm)  Pulse 80  Temp 98.1  F (36.7  C) (Oral)  Resp 14  Ht 1.727 m (5' 8\")  Wt 56.7 kg (125 lb)  LMP  (LMP Unknown)  SpO2 97%  BMI 19.01 kg/m2    No acute distress  Non-labored respiration  LLE: dressing c,d,i, wiggles toes and ankle, limited by pain, sensation intact in deep peroneal, superficial peroneal, tibial, sural, saphenous nerve distributions, foot wwp    Labs:    Recent Labs  Lab 11/01/18  0556 10/31/18  1859    144   POTASSIUM 3.8 3.7   CHLORIDE 107 106   CO2 32 33*   BUN 20 22   CR 0.83 1.00   GLC 86 98       Recent Labs  Lab 11/01/18  0556 10/31/18  1859   WBC 4.3 7.1   HGB 9.6* 9.9*   * 124*       Recent Labs  Lab 10/31/18  1859   INR 1.03       A/P:   Marylee Woods is a 63 year old female with MS who sustained a distal femur fracture on the left during a mechanical fall on 10/31    Plan for OR later today for ORIF left femur   - consent on the chart   - labs complete   - medicine pre op eval pending     -Ortho primary   -WB: NWB LLE  -Activity: bedrest until post op   -DVT ppx: no anticoagulation, until post op   -Abx: jana op ancef  -PT/OT  -Pain: PO as tolerated with IV for breakthrough   -Diet: ADAT   -Labs: recheck CBC and BMP this am   -Consults: hospitalist   -Follow up: tbd  -Dispo: taylor Gannon MD   Orthopedic Surgery; PGY-4  Pager: [phone]    For any questions regarding this patient please page me prior to paging the ortho resident on call.    "

## 2018-11-02 ENCOUNTER — APPOINTMENT (OUTPATIENT)
Dept: PHYSICAL THERAPY | Facility: CLINIC | Age: 63
DRG: 481 | End: 2018-11-02
Payer: MEDICARE

## 2018-11-02 ENCOUNTER — APPOINTMENT (OUTPATIENT)
Dept: OCCUPATIONAL THERAPY | Facility: CLINIC | Age: 63
DRG: 481 | End: 2018-11-02
Payer: MEDICARE

## 2018-11-02 LAB
ANION GAP SERPL CALCULATED.3IONS-SCNC: 5 MMOL/L (ref 3–14)
BACTERIA SPEC CULT: NO GROWTH
BUN SERPL-MCNC: 20 MG/DL (ref 7–30)
CALCIUM SERPL-MCNC: 8.3 MG/DL (ref 8.5–10.1)
CHLORIDE SERPL-SCNC: 105 MMOL/L (ref 94–109)
CO2 SERPL-SCNC: 32 MMOL/L (ref 20–32)
CREAT SERPL-MCNC: 1.05 MG/DL (ref 0.52–1.04)
ERYTHROCYTE [DISTWIDTH] IN BLOOD BY AUTOMATED COUNT: 13.7 % (ref 10–15)
FERRITIN SERPL-MCNC: 223 NG/ML (ref 8–252)
FOLATE SERPL-MCNC: 59.8 NG/ML
GFR SERPL CREATININE-BSD FRML MDRD: 53 ML/MIN/1.7M2
GLUCOSE SERPL-MCNC: 95 MG/DL (ref 70–99)
HCT VFR BLD AUTO: 27.5 % (ref 35–47)
HGB BLD-MCNC: 9 G/DL (ref 11.7–15.7)
INTERPRETATION ECG - MUSE: NORMAL
IRON SATN MFR SERPL: 9 % (ref 15–46)
IRON SERPL-MCNC: 14 UG/DL (ref 35–180)
Lab: NORMAL
MAGNESIUM SERPL-MCNC: 1.8 MG/DL (ref 1.6–2.3)
MCH RBC QN AUTO: 30.8 PG (ref 26.5–33)
MCHC RBC AUTO-ENTMCNC: 32.7 G/DL (ref 31.5–36.5)
MCV RBC AUTO: 94 FL (ref 78–100)
PLATELET # BLD AUTO: 83 10E9/L (ref 150–450)
POTASSIUM SERPL-SCNC: 3.8 MMOL/L (ref 3.4–5.3)
RBC # BLD AUTO: 2.92 10E12/L (ref 3.8–5.2)
SODIUM SERPL-SCNC: 142 MMOL/L (ref 133–144)
SPECIMEN SOURCE: NORMAL
TIBC SERPL-MCNC: 161 UG/DL (ref 240–430)
TSH SERPL DL<=0.005 MIU/L-ACNC: 1.47 MU/L (ref 0.4–4)
VIT B12 SERPL-MCNC: 564 PG/ML (ref 193–986)
WBC # BLD AUTO: 4 10E9/L (ref 4–11)

## 2018-11-02 PROCEDURE — 97110 THERAPEUTIC EXERCISES: CPT | Mod: GP | Performed by: PHYSICAL THERAPIST

## 2018-11-02 PROCEDURE — 25000132 ZZH RX MED GY IP 250 OP 250 PS 637: Mod: GY | Performed by: ORTHOPAEDIC SURGERY

## 2018-11-02 PROCEDURE — 36415 COLL VENOUS BLD VENIPUNCTURE: CPT | Performed by: INTERNAL MEDICINE

## 2018-11-02 PROCEDURE — 25000132 ZZH RX MED GY IP 250 OP 250 PS 637: Mod: GY | Performed by: STUDENT IN AN ORGANIZED HEALTH CARE EDUCATION/TRAINING PROGRAM

## 2018-11-02 PROCEDURE — 25000128 H RX IP 250 OP 636: Performed by: STUDENT IN AN ORGANIZED HEALTH CARE EDUCATION/TRAINING PROGRAM

## 2018-11-02 PROCEDURE — 82728 ASSAY OF FERRITIN: CPT | Performed by: INTERNAL MEDICINE

## 2018-11-02 PROCEDURE — 97530 THERAPEUTIC ACTIVITIES: CPT | Mod: GP

## 2018-11-02 PROCEDURE — 40000193 ZZH STATISTIC PT WARD VISIT: Performed by: PHYSICAL THERAPIST

## 2018-11-02 PROCEDURE — 40000133 ZZH STATISTIC OT WARD VISIT: Performed by: OCCUPATIONAL THERAPIST

## 2018-11-02 PROCEDURE — 97535 SELF CARE MNGMENT TRAINING: CPT | Mod: GO | Performed by: OCCUPATIONAL THERAPIST

## 2018-11-02 PROCEDURE — 97162 PT EVAL MOD COMPLEX 30 MIN: CPT | Mod: GP

## 2018-11-02 PROCEDURE — 83540 ASSAY OF IRON: CPT | Performed by: INTERNAL MEDICINE

## 2018-11-02 PROCEDURE — 82746 ASSAY OF FOLIC ACID SERUM: CPT | Performed by: INTERNAL MEDICINE

## 2018-11-02 PROCEDURE — A9270 NON-COVERED ITEM OR SERVICE: HCPCS | Mod: GY | Performed by: STUDENT IN AN ORGANIZED HEALTH CARE EDUCATION/TRAINING PROGRAM

## 2018-11-02 PROCEDURE — 99207 ZZC CDG-MDM COMPONENT: MEETS LOW - DOWN CODED: CPT | Performed by: INTERNAL MEDICINE

## 2018-11-02 PROCEDURE — 25000132 ZZH RX MED GY IP 250 OP 250 PS 637: Mod: GY | Performed by: INTERNAL MEDICINE

## 2018-11-02 PROCEDURE — 25000128 H RX IP 250 OP 636: Performed by: EMERGENCY MEDICINE

## 2018-11-02 PROCEDURE — 99232 SBSQ HOSP IP/OBS MODERATE 35: CPT | Performed by: INTERNAL MEDICINE

## 2018-11-02 PROCEDURE — A9270 NON-COVERED ITEM OR SERVICE: HCPCS | Mod: GY | Performed by: INTERNAL MEDICINE

## 2018-11-02 PROCEDURE — 85027 COMPLETE CBC AUTOMATED: CPT | Performed by: INTERNAL MEDICINE

## 2018-11-02 PROCEDURE — 97165 OT EVAL LOW COMPLEX 30 MIN: CPT | Mod: GO | Performed by: OCCUPATIONAL THERAPIST

## 2018-11-02 PROCEDURE — A9270 NON-COVERED ITEM OR SERVICE: HCPCS | Mod: GY | Performed by: ORTHOPAEDIC SURGERY

## 2018-11-02 PROCEDURE — 83735 ASSAY OF MAGNESIUM: CPT | Performed by: INTERNAL MEDICINE

## 2018-11-02 PROCEDURE — 12000001 ZZH R&B MED SURG/OB UMMC

## 2018-11-02 PROCEDURE — 84443 ASSAY THYROID STIM HORMONE: CPT | Performed by: INTERNAL MEDICINE

## 2018-11-02 PROCEDURE — 80048 BASIC METABOLIC PNL TOTAL CA: CPT | Performed by: INTERNAL MEDICINE

## 2018-11-02 PROCEDURE — 97530 THERAPEUTIC ACTIVITIES: CPT | Mod: GP | Performed by: PHYSICAL THERAPIST

## 2018-11-02 PROCEDURE — 82607 VITAMIN B-12: CPT | Performed by: INTERNAL MEDICINE

## 2018-11-02 PROCEDURE — 40000193 ZZH STATISTIC PT WARD VISIT

## 2018-11-02 PROCEDURE — 83550 IRON BINDING TEST: CPT | Performed by: INTERNAL MEDICINE

## 2018-11-02 RX ORDER — VALACYCLOVIR HYDROCHLORIDE 500 MG/1
500 TABLET, FILM COATED ORAL 2 TIMES DAILY
Status: DISCONTINUED | OUTPATIENT
Start: 2018-11-02 | End: 2018-11-06 | Stop reason: HOSPADM

## 2018-11-02 RX ORDER — OXYCODONE HYDROCHLORIDE 5 MG/1
5-10 TABLET ORAL
Qty: 70 TABLET | Refills: 0 | Status: SHIPPED | OUTPATIENT
Start: 2018-11-02 | End: 2018-11-05

## 2018-11-02 RX ORDER — BISACODYL 10 MG
10 SUPPOSITORY, RECTAL RECTAL ONCE
Status: DISCONTINUED | OUTPATIENT
Start: 2018-11-02 | End: 2018-11-06 | Stop reason: HOSPADM

## 2018-11-02 RX ORDER — HYDROXYZINE HYDROCHLORIDE 25 MG/1
25 TABLET, FILM COATED ORAL EVERY 6 HOURS PRN
Status: DISCONTINUED | OUTPATIENT
Start: 2018-11-02 | End: 2018-11-06 | Stop reason: HOSPADM

## 2018-11-02 RX ORDER — CALCIUM CARBONATE 500 MG/1
500 TABLET, CHEWABLE ORAL DAILY PRN
Status: DISCONTINUED | OUTPATIENT
Start: 2018-11-02 | End: 2018-11-06 | Stop reason: HOSPADM

## 2018-11-02 RX ORDER — GABAPENTIN 300 MG/1
900 CAPSULE ORAL AT BEDTIME
Status: DISCONTINUED | OUTPATIENT
Start: 2018-11-02 | End: 2018-11-06 | Stop reason: HOSPADM

## 2018-11-02 RX ORDER — SODIUM CHLORIDE, SODIUM LACTATE, POTASSIUM CHLORIDE, CALCIUM CHLORIDE 600; 310; 30; 20 MG/100ML; MG/100ML; MG/100ML; MG/100ML
INJECTION, SOLUTION INTRAVENOUS
Status: DISPENSED
Start: 2018-11-02 | End: 2018-11-03

## 2018-11-02 RX ORDER — POLYETHYLENE GLYCOL 3350 17 G/17G
17 POWDER, FOR SOLUTION ORAL DAILY
Status: DISCONTINUED | OUTPATIENT
Start: 2018-11-03 | End: 2018-11-04

## 2018-11-02 RX ORDER — GABAPENTIN 300 MG/1
600 CAPSULE ORAL DAILY
Status: DISCONTINUED | OUTPATIENT
Start: 2018-11-03 | End: 2018-11-06 | Stop reason: HOSPADM

## 2018-11-02 RX ADMIN — SODIUM CHLORIDE, POTASSIUM CHLORIDE, SODIUM LACTATE AND CALCIUM CHLORIDE: 600; 310; 30; 20 INJECTION, SOLUTION INTRAVENOUS at 20:03

## 2018-11-02 RX ADMIN — OXYCODONE HYDROCHLORIDE 5 MG: 5 TABLET ORAL at 10:41

## 2018-11-02 RX ADMIN — ACETAMINOPHEN 975 MG: 325 TABLET, FILM COATED ORAL at 08:02

## 2018-11-02 RX ADMIN — ACETAMINOPHEN 975 MG: 325 TABLET, FILM COATED ORAL at 00:14

## 2018-11-02 RX ADMIN — CEFAZOLIN SODIUM 2 G: 2 INJECTION, SOLUTION INTRAVENOUS at 07:49

## 2018-11-02 RX ADMIN — VALACYCLOVIR HYDROCHLORIDE 500 MG: 500 TABLET, FILM COATED ORAL at 19:39

## 2018-11-02 RX ADMIN — BACLOFEN 20 MG: 20 TABLET ORAL at 07:51

## 2018-11-02 RX ADMIN — ACETAMINOPHEN 975 MG: 325 TABLET, FILM COATED ORAL at 16:12

## 2018-11-02 RX ADMIN — OXYCODONE HYDROCHLORIDE 10 MG: 5 TABLET ORAL at 19:31

## 2018-11-02 RX ADMIN — ACETAMINOPHEN 975 MG: 325 TABLET, FILM COATED ORAL at 23:55

## 2018-11-02 RX ADMIN — OXYCODONE HYDROCHLORIDE 5 MG: 5 TABLET ORAL at 09:36

## 2018-11-02 RX ADMIN — OXYCODONE HYDROCHLORIDE 5 MG: 5 TABLET ORAL at 06:21

## 2018-11-02 RX ADMIN — MULTIPLE VITAMINS W/ MINERALS TAB 1 TABLET: TAB at 07:51

## 2018-11-02 RX ADMIN — BACLOFEN 30 MG: 20 TABLET ORAL at 19:33

## 2018-11-02 RX ADMIN — Medication 0.5 MG: at 04:44

## 2018-11-02 RX ADMIN — Medication 0.5 MG: at 20:56

## 2018-11-02 RX ADMIN — ENOXAPARIN SODIUM 40 MG: 40 INJECTION SUBCUTANEOUS at 23:47

## 2018-11-02 RX ADMIN — OXYCODONE HYDROCHLORIDE 10 MG: 5 TABLET ORAL at 23:47

## 2018-11-02 RX ADMIN — GABAPENTIN 900 MG: 300 CAPSULE ORAL at 21:01

## 2018-11-02 RX ADMIN — POLYETHYLENE GLYCOL 3350 17 G: 17 POWDER, FOR SOLUTION ORAL at 07:52

## 2018-11-02 RX ADMIN — OMEPRAZOLE 40 MG: 20 CAPSULE, DELAYED RELEASE ORAL at 07:52

## 2018-11-02 RX ADMIN — ENOXAPARIN SODIUM 40 MG: 40 INJECTION SUBCUTANEOUS at 00:10

## 2018-11-02 RX ADMIN — GABAPENTIN 600 MG: 300 CAPSULE ORAL at 07:52

## 2018-11-02 RX ADMIN — OXYBUTYNIN CHLORIDE 10 MG: 5 TABLET, EXTENDED RELEASE ORAL at 07:51

## 2018-11-02 RX ADMIN — CEFAZOLIN SODIUM 2 G: 2 INJECTION, SOLUTION INTRAVENOUS at 00:05

## 2018-11-02 RX ADMIN — NITROFURANTOIN (MONOHYDRATE/MACROCRYSTALS) 100 MG: 75; 25 CAPSULE ORAL at 07:52

## 2018-11-02 RX ADMIN — SERTRALINE HYDROCHLORIDE 150 MG: 100 TABLET ORAL at 19:32

## 2018-11-02 RX ADMIN — CHOLECALCIFEROL CAP 125 MCG (5000 UNIT) 5000 UNITS: 125 CAP at 07:51

## 2018-11-02 RX ADMIN — OXYBUTYNIN CHLORIDE 10 MG: 5 TABLET, EXTENDED RELEASE ORAL at 16:12

## 2018-11-02 RX ADMIN — KETOROLAC TROMETHAMINE 15 MG: 15 INJECTION, SOLUTION INTRAMUSCULAR; INTRAVENOUS at 03:47

## 2018-11-02 ASSESSMENT — ACTIVITIES OF DAILY LIVING (ADL)
ADLS_ACUITY_SCORE: 20
ADLS_ACUITY_SCORE: 18
ADLS_ACUITY_SCORE: 25
ADLS_ACUITY_SCORE: 18
ADLS_ACUITY_SCORE: 25
ADLS_ACUITY_SCORE: 19

## 2018-11-02 NOTE — PLAN OF CARE
Problem: Patient Care Overview  Goal: Plan of Care/Patient Progress Review  Discharge Planner OT   Patient plan for discharge: Home with new PCA service, would like to discuss with SW  Current status: Pt requiring max A for bed mobility from supine > EOB for management of legs.  Pt then sitting on the EOB with CGA for ~15 minutes with three instance of falling back and requiring min to mod A to catch self.  Pt brushed teeth with SBA to min A with set-up required.  Pt requiring to bed with max A and Ax2 for boost  Barriers to return to prior living situation: Post surgical precautions, decreased activity tolerance  Recommendations for discharge: Anticipate TCU, may progress to home with assist from  and PCA  Rationale for recommendations: Pt will required greater amount of assistance for ADL following surgery       Entered by: Sudhir Mitchell 11/02/2018 3:21 PM

## 2018-11-02 NOTE — PROGRESS NOTES
11/02/18 0833   Quick Adds   Type of Visit Initial PT Evaluation   Living Environment   Lives With spouse   Living Arrangements house   Home Accessibility bed and bath on same level;ramps present at home   Number of Stairs to Enter Home 0   Number of Stairs Within Home 0   Transportation Available family or friend will provide   Living Environment Comment Pt lives with  with all cares met on single level.   Self-Care   Dominant Hand right   Usual Activity Tolerance moderate   Current Activity Tolerance fair   Equipment Currently Used at Home walker, rolling;wheelchair, manual;wheelchair, power   Activity/Exercise/Self-Care Comment PT currently participating in PT/OT   Functional Level Prior   Ambulation 1-->assistive equipment   Transferring 1-->assistive equipment   Toileting 0-->independent   Bathing 0-->independent   Dressing 2-->assistive person   Eating 0-->independent   Communication 0-->understands/communicates without difficulty   Swallowing 0-->swallows foods/liquids without difficulty   Cognition 0 - no cognition issues reported   Fall history within last six months yes   Number of times patient has fallen within last six months 1   Which of the above functional risks had a recent onset or change? ambulation;transferring   Prior Functional Level Comment pt walking short distances in therapy, requiring Charity for transfers ~50% of the time, but is able to be Naima at baseline. Pt's  assists with LE dressing and transfers. Pt and  have been trying to get PCA hours as   unable to assist as much with recent rotator cuff injury with anticipated surgery.   General Information   Onset of Illness/Injury or Date of Surgery - Date 10/31/18   Referring Physician Paco Gannon MD   Patient/Family Goals Statement go home, open to rehab if needed   Pertinent History of Current Problem (include personal factors and/or comorbidities that impact the POC) Marylee Woods is a 63 year  old female with MS who sustained a distal femur fracture on the left during a mechanical fall on 10/31   Precautions/Limitations fall precautions   Weight-Bearing Status - LLE toe touch weight-bearing   Weight-Bearing Status - RLE full weight-bearing   General Info Comments MS at baseline with L>R paresis   Cognitive Status Examination   Orientation orientation to person, place and time   Level of Consciousness alert   Follows Commands and Answers Questions 100% of the time   Personal Safety and Judgment intact   Memory intact   Pain Assessment   Patient Currently in Pain Yes, see Vital Sign flowsheet   Integumentary/Edema   Integumentary/Edema Comments lymphedema at basline, wears compression stockings, pt notes currently at baseline swelling   Posture    Posture Protracted shoulders;Forward head position   Range of Motion (ROM)   ROM Comment L LE limited by ORIF, all other extremeties WFL per functional mobility assessment   Strength   Strength Comments NT due to pain, pt with baseline weakness secondary to MS L>R. RLE and B UEs WFL >3/5 per functional mobility.   Bed Mobility   Bed Mobility Comments modA supine>sit, maxAx2 sit>supine   Transfer Skills   Transfer Comments modAx1 sit<>stand to FWW   Gait   Gait Comments not initiated   Balance   Balance Comments requires use of UE support due to TTWB status and weakness   Sensory Examination   Sensory Perception Comments baseline numbness/tingling in B LEs secondary to MS   General Therapy Interventions   Planned Therapy Interventions balance training;bed mobility training;gait training;neuromuscular re-education;ROM;strengthening;transfer training;progressive activity/exercise;home program guidelines   Clinical Impression   Criteria for Skilled Therapeutic Intervention yes, treatment indicated   PT Diagnosis impaired functional mobility   Influenced by the following impairments strength, balance, pain, ROM, sensation   Functional limitations due to impairments  "gait, transfers, bed mobility   Clinical Presentation Evolving/Changing   Clinical Presentation Rationale PMH, clinician impression, 2+ systems involved   Clinical Decision Making (Complexity) Moderate complexity   Therapy Frequency` 2 times/day   Predicted Duration of Therapy Intervention (days/wks) 3-4 days   Anticipated Equipment Needs at Discharge other (see comments)  (none)   Anticipated Discharge Disposition Transitional Care Facility   Risk & Benefits of therapy have been explained Yes   Patient, Family & other staff in agreement with plan of care Yes   Clinical Impression Comments eval complete, tx initiated   [facility] AM-[facility] TM \"6 Clicks\"   2016, Trustees of [facility], under license to [employer].  All rights reserved.   6 Clicks Short Forms Basic Mobility Inpatient Short Form   [facility] AM-PAC  \"6 Clicks\" V.2 Basic Mobility Inpatient Short Form   1. Turning from your back to your side while in a flat bed without using bedrails? 3 - A Little   2. Moving from lying on your back to sitting on the side of a flat bed without using bedrails? 2 - A Lot   3. Moving to and from a bed to a chair (including a wheelchair)? 2 - A Lot   4. Standing up from a chair using your arms (e.g., wheelchair, or bedside chair)? 2 - A Lot   5. To walk in hospital room? 1 - Total   6. Climbing 3-5 steps with a railing? 1 - Total   Basic Mobility Raw Score (Score out of 24.Lower scores equate to lower levels of function) 11   Total Evaluation Time   Total Evaluation Time (Minutes) 15     "

## 2018-11-02 NOTE — OP NOTE
PREOPERATIVE DIAGNOSIS:  Left distal femur fracture     POSTOPERATIVE DIAGNOSIS:  Left distal femur fracture     PROCEDURE:  Open reduction internal fixation left distal femur     ATTENDING SURGEON:  Dr. Valadez     ASSISTANT:  Paco Gannon, PGY-4     ANESTHESIA:  General     TOURNIQUET TIME:   N/a     ESTIMATED BLOOD LOSS:  150 ml      SPECIMENS:  None.      DRAINS:  None.      IMPLANTS:  synthes lateral locking plate      COMPLICATIONS:  None apparent.      BRIEF HISTORY:  62 yo F with MS who sustained a mechanical fall earlier today.  She fell out of her wheelchair when she got caught.  She landed on her left knee and had immediate pain in that area.  She denies any other injury or any head trauma or loss of consciousness.  She is now unable to ambulate since the injury.  She denies any new numbness or tingling.  She does have some edema and wears lymphedema compression socks.     INTRAOPERATIVE FINDINGS:  No significant findings     DESCRIPTION OF PROCEDURE:  The patient was identified in the preoperative holding area.  The informed consent was reviewed. The operative site was identified and marked. The patient was brought to the operating room and place supine on the operating room table.  General anesthesia was induced without any difficulty.  Patient was positioned with a bump under the left hip and blankets used to build up the left lower extremity above the right.  IV antibiotics were given prior to incision.  The left lower extremity was prepped and draped in the usual sterile fashion.    Fluoroscopy was used to evaluate the fracture and its displacement.  It was noted to be quite well reduced without any maneuvers needed.  A 6 cm incision was made over the center aspect of the lateral condyle and taken down to the IT band.  The IT band was split in line with the incision and manual palpation was used to identify the fracture site and relieved fracture hematoma.  A 10 hole plate was then slid up along  the bone evaluated with x-ray.  It was noted to be slightly proximal and the incision was extended distally including the IT band incision.  The plate was then brought more distal and pinned into position with K wires on the proximal and distal and using fluoroscopy to ensure that the plate was centered on the bone.  We then began our fixation of the distal end of the plate.  A large C-clamp was used to compress the plate down to the bone.  Multiple locking screws were then placed.  Fluoroscopy confirmed the position of the screws. With the plate secured distally our attention was turned to the proximal end.  Using the percutaneous aiming arm we attempted to place a screw in the fourth screw hole however, this screw did not have good purchase.  Given this, we abandoned the screw hole for the time being and placed a cortical screw in the hole just proximal to this.  In this hole we were able to pull the plate down to the bone more than it had been resting.  With this completed we then placed 3 locking screws proximal to this and one additional locking screw back in at the 4th hole.  A final lateral condyle screw was placed in the remaining hole distallyt.  Final x-rays were obtained to evaluate screw position and plate fixation.  Oblique x-rays were taken to ensure that all screws were out of the joint distally.  Wounds were irrigated and closed with 0 Vicryl for the IT band, 2-0 Vicryl for the subcutaneous tissue and 3-0 Monocryl for the skin.  A layer of Dermabond was then placed.  All sponge needle counts were correct at the end of the case.  The patient tolerated the procedure well and there were no immediate complications.     POSTOPERATIVE PLAN:  TTWB LLE, hinged knee brace 0-40 degrees, lovenox for dvt ppx x 4 weeks, follow up 2 weeks for wound check.     Dr. Valadez was scrubbed for all critical portions of the case.     Paco Gannon MD  Orthopedic Surgery, PGY-4    Attending MD (Dr. Jose WILLAMS  Rory) :  I reviewed and verified the history and physical exam of the patient and discussed the patient's management with the other clinical providers involved in this patient's care including any involved residents or physicians assistants. I reviewed the above note and agree with the documented findings and plan of care, which were communicated to the patient.  I was present and scrubbed for the entire procedure.    Jose Valadez MD

## 2018-11-02 NOTE — ANESTHESIA POSTPROCEDURE EVALUATION
Anesthesia POST Procedure Evaluation    Patient: Marylee Woods   MRN:     [id number] Gender:   female   Age:    63 year old :      1955        Preoperative Diagnosis: See H & P   Procedure(s):  OPEN REDUCTION INTERNAL FIXATION LEFT FEMUR DISTAL   Postop Comments: No value filed.       Anesthesia Type:  General    Reportable Event: NO     PAIN: Uncomplicated   Sign Out status: Comfortable, Well controlled pain     PONV: No PONV   Sign Out status:  No Nausea or Vomiting     Neuro/Psych: Uneventful perioperative course   Sign Out Status: Preoperative baseline; Age appropriate mentation     Airway/Resp.: Uneventful perioperative course   Sign Out Status: Airway Device present     CV: Uneventful perioperative course   Sign Out status: Appropriate BP and perfusion indices; Appropriate HR/Rhythm     Disposition:   Sign Out in:  Phase II  Disposition:  Floor  Recovery Course: Uneventful  Follow-Up: Not required     Comments/Narrative:  Patient doing well post-operatively.  No significant issues.  Hemodynamically stable, pain well controlled, nausea well controlled.  Stable for discharge from the PACU             Last Anesthesia Record Vitals:  CRNA VITALS  2018 1831 - 2018 1931      2018             NIBP: 136/85    Pulse: 93    NIBP Mean: 109    Temp: -     98    SpO2: 100 %    Resp Rate (observed): 10          Last PACU/Preop Vitals:  Vitals:    18 1930 18 1945 18   BP:  157/88 124/51   Pulse: 100     Resp:  8 8   Temp:  36.7  C (98  F)    SpO2:  98% 97%         Electronically Signed By: Jackson Ernandez MD, 2018, 8:08 PM

## 2018-11-02 NOTE — ANESTHESIA CARE TRANSFER NOTE
Patient: Marylee Woods    Procedure(s):  OPEN REDUCTION INTERNAL FIXATION LEFT FEMUR DISTAL    Diagnosis: See H & P  Diagnosis Additional Information: No value filed.    Anesthesia Type:   No value filed.     Note:  Airway :Face Mask  Patient transferred to:PACU  Comments: VSS.  Drowsy, but will open eyes to name.  Spontaneous respirations.  Satisfactory anesthetic recovery.Handoff Report: Identifed the Patient, Identified the Reponsible Provider, Reviewed the pertinent medical history, Discussed the surgical course, Reviewed Intra-OP anesthesia mangement and issues during anesthesia, Set expectations for post-procedure period and Allowed opportunity for questions and acknowledgement of understanding      Vitals: (Last set prior to Anesthesia Care Transfer)    CRNA VITALS  11/1/2018 1831 - 11/1/2018 1915      11/1/2018             NIBP: 136/85    Pulse: 93    NIBP Mean: 109    Temp: -     98    SpO2: 100 %    Resp Rate (observed): 10                Electronically Signed By: SWAPNIL Benz CRNA  November 1, 2018  7:15 PM

## 2018-11-02 NOTE — PROGRESS NOTES
11/02/18 1500   Quick Adds   Type of Visit Initial Occupational Therapy Evaluation   Living Environment   Lives With spouse   Living Arrangements house   Home Accessibility bed and bath on same level;ramps present at home   Number of Stairs to Enter Home 0   Number of Stairs Within Home 0   Stair Railings at Home other (see comments)   Transportation Available family or friend will provide;public transportation;agency transportation   Living Environment Comment Pt lives with , all needs met on same floor   Self-Care   Dominant Hand right   Usual Activity Tolerance moderate   Current Activity Tolerance fair   Regular Exercise yes   Activity/Exercise Type other (see comments)  (OT/PT)   Exercise Amount/Frequency 2 times/wk   Equipment Currently Used at Home walker, standard;wheelchair, power   Activity/Exercise/Self-Care Comment Pt seeing OT/PT 2x/ week   Functional Level Prior   Ambulation 1-->assistive equipment   Transferring 3-->assistive equipment and person   Toileting 3-->assistive equipment and person   Bathing 2-->assistive person   Dressing 2-->assistive person   Eating 0-->independent   Communication 0-->understands/communicates without difficulty   Swallowing 0-->swallows foods/liquids without difficulty   Cognition 0 - no cognition issues reported   Fall history within last six months yes   Number of times patient has fallen within last six months 1   Which of the above functional risks had a recent onset or change? transferring;toileting;bathing   Prior Functional Level Comment Pt reports needing assistance with ADL,  assists with dressing, toileting and bathing.  Pt completing sponge baths at home   General Information   Onset of Illness/Injury or Date of Surgery - Date 10/31/18   Referring Physician Jose Valadez   Patient/Family Goals Statement To return home   Additional Occupational Profile Info/Pertinent History of Current Problem Marylee Woods is a 63 year old female with MS  who sustained a distal femur fracture on the left during a mechanical fall on 10/31 now s/p ORIF on 11/1   Precautions/Limitations fall precautions   Weight-Bearing Status - LUE full weight-bearing   Weight-Bearing Status - RUE full weight-bearing   Weight-Bearing Status - LLE toe touch weight-bearing   Weight-Bearing Status - RLE full weight-bearing   Cognitive Status Examination   Orientation orientation to person, place and time   Level of Consciousness alert   Able to Follow Commands WNL/WFL   Personal Safety (Cognitive) WNL/WFL   Memory intact   Attention No deficits were identified   Organization/Problem Solving No deficits were identified   Executive Function No deficits were identified   Pain Assessment   Patient Currently in Pain Yes, see Vital Sign flowsheet   Integumentary/Edema   Integumentary/Edema no deficits were identifed   Range of Motion (ROM)   ROM Comment B UE ROM WFL   Strength   Strength Comments Strength appeared slightly decreased   Activities of Daily Living Analysis   Impairments Contributing to Impaired Activities of Daily Living strength decreased;post surgical precautions;pain   General Therapy Interventions   Planned Therapy Interventions ADL retraining;IADL retraining;bed mobility training;balance training;fine motor coordination training;strengthening;transfer training;home program guidelines;progressive activity/exercise   Clinical Impression   Criteria for Skilled Therapeutic Interventions Met yes, treatment indicated   OT Diagnosis decreased ADL independence with ADL   Influenced by the following impairments TTWB L LE, decreased activity tolerance   Assessment of Occupational Performance 3-5 Performance Deficits   Identified Performance Deficits dressing, toileting, grooming/hygiene, eating   Clinical Decision Making (Complexity) Low complexity   Therapy Frequency daily   Predicted Duration of Therapy Intervention (days/wks) 4 days   Anticipated Equipment Needs at Discharge  "reacher;sock aide;raised toilet seat;bedside commode   Anticipated Discharge Disposition Home with Home Therapy;Home with Outpatient Therapy;Transitional Care Facility   Risks and Benefits of Treatment have been explained. Yes   Patient, Family & other staff in agreement with plan of care Yes   Clinical Impression Comments Pt presents with TTWB on L LE resulting in decreased independence with ADL.  Pt will benefit from skilled OT services to progress activity tolerance and independence with ADL.   [facility] AM-PAC TM \"6 Clicks\"   2016, Trustees of [facility], under license to "[employer].  All rights reserved.   6 Clicks Short Forms Daily Activity Inpatient Short Form   [facility] AM-PAC  \"6 Clicks\" Daily Activity Inpatient Short Form   1. Putting on and taking off regular lower body clothing? 2 - A Lot   2. Bathing (including washing, rinsing, drying)? 2 - A Lot   3. Toileting, which includes using toilet, bedpan or urinal? 2 - A Lot   4. Putting on and taking off regular upper body clothing? 2 - A Lot   5. Taking care of personal grooming such as brushing teeth? 3 - A Little   6. Eating meals? 3 - A Little   Daily Activity Raw Score (Score out of 24.Lower scores equate to lower levels of function) 14   Total Evaluation Time   Total Evaluation Time (Minutes) 8     "

## 2018-11-02 NOTE — PLAN OF CARE
Problem: Patient Care Overview  Goal: Plan of Care/Patient Progress Review  Pt. admitted from PACU at 2030. Pt. arrived with personal belongings. Pt. is A&Ox4, pt was sedated at beginning of shift. VSS. CMS and Neuro's are intact. Denies numbness and tingling in all extremities. Toradol and dilaudid given for pain. Pt. denied nausea, CP, SOB, lightheadedness, and dizziness. Is on a Clear Liquid diet and tolerated well. Dressing is C/D/I, hinged knee brace on. Verduzco Catheter is patent and draining adequate amounts.  Pt. is oriented to the room and call light system and the call light is within reach. Continue to monitor.

## 2018-11-02 NOTE — PLAN OF CARE
Problem: Patient Care Overview  Goal: Plan of Care/Patient Progress Review  Discharge Planner PT   Patient plan for discharge: hoping to speak with SW about PCA help with return to home, but open to rehab if needed  Current status: pt modA supine>sit, maxAx2 sit>supine, requires assist to scoot EOB due to brace weight/restriction, performs x2 stands to FWW modA with good adherence to TTWB, unable to initiate gait due to weakness. Pt very motivated with good support. At baseline she only ambulates with PT for short distances due to history of MS and is primarily limited to transfers for mobility, uses power chair as main AD. According to , pt is able to perform bed mobility and transfers IND if needed at home, but requires assist about 50% of the time for safety and to reduce fatigue.  unable to continue being assistive caregiver due to recent rotator cuff injury that he is seeking surgery for. Pt and  both looking into PCA as option for assist at home, currently with no PCA hours and wanting to speak with SW about this option.  Barriers to return to prior living situation: requires heavy assist for all mobility, pain, reduced caregiver support at home  Recommendations for discharge: TCU  Rationale for recommendations: pt currently below baseline mobility (Naima to Charity for transfers, short distance gait CGA) and would benefit from ongoing therapy to address strength, balance, and activity tolerance deficits as well as to provide mobility training with new L TTWB status in conjunction with baseline MS deficits.       Entered by: Mi Smith 11/02/2018 9:35 AM

## 2018-11-02 NOTE — CONSULTS
"Social Work: Assessment with Discharge Plan    Patient Name:  Marylee Woods  :  1955  Age:  63 year old  MRN:  [id number]  Risk/Complexity Score:  Filed Complexity Screen Score: 11  Completed assessment with:  Pt, chart review, pt's friend  Presenting Information   Reason for Referral:  Discharge plan  Date of Intake:  2018  Referral Source:  Chart Review  Decision Maker:  pt  Alternate Decision Maker:  Spouse Manuel    Living Situation:  House  Previous Functional Status:  Independent  Patient and family understanding of hospitalization:  Seeking treatment for leg fx  Cultural/Language/Spiritual Considerations:  Pt has MS, is part of \"[employer]\" group, has support from adult children, friends, siblings and other family  Adjustment to Illness:  Anticipating need for more support -PCA when she returns home    Physical Health  Reason for Admission:    1. Closed fracture of distal end of left femur, sequela    2. Closed fracture of distal end of left femur, unspecified fracture morphology, initial encounter (H)      Services Needed/Recommended:  Other:  rheab    Mental Health/Chemical Dependency  Diagnosis:  None noted  Support/Services in Place:  NA  Services Needed/Recommended:  NA    Support System  Significant relationship at present time:  Spouse and friends  Family of origin is available for support:  yes  Other support available:  Other family  Gaps in support system:  None noted-pt expresses concern that her family members are gearing up for surgery or recovering from surgery and may not be able to help with caregiving at home  Patient is caregiver to:  None     Provider Information   Primary Care Physician:  Carolina Pulliam   [phone]   Clinic:  [address] [place] [address] / [facility] [id number]      :      Financial   Income Source:  Pension, social security  Financial Concerns:   None noted  Insurance:    Payor/Plan Subscriber Name Rel Member # Group #   MEDICARE - MEDICARE " WOODS,MARYLEE  [id number]       ATTN CLAIMS, [address]   BCBS - [place] WOODS,MARYLEE  [id number]      [address] [address]       Discharge Plan   Patient and family discharge goal:  [facility]  Provided education on discharge plan:  YES  Patient agreeable to discharge plan:  YES  A list of Medicare Certified Facilities was provided to the patient and/or family to encourage patient choice. Patient's choices for facility are:  Pt has had previous stay at  TCU and  ARU.   Will NH provide Skilled rehabilitation or complex medical:  YES  General information regarding anticipated insurance coverage and possible out of pocket cost was discussed. Patient and patient's family are aware patient may incur the cost of transportation to the facility, pending insurance payment: YES-pt is interested in pursuing PCA services. She does not have  or had PCA services prior to hospitalization.   Barriers to discharge:  Medical stability    Discharge Recommendations   [facility]  Transportation Needs:  Other:  to be determined  Name of Transportation Company and Phone:  To be determined    Additional comments   Writer introduced role/reason for visit. Pt is receptive to idea of having TCU/ARU stay and has had previous stays. Pt is also interested in referral for PCA assessment; writer discussed process and pt was receptive. Writer attempted to call [facility] for referral but [facility] Phones were down. Will call on Monday, 11/5/18.    Prior to hospitalization, pt used manual WC primarily for distances but would stand and transfer independently. She believes her spouse needs training on how to use Gait Belt as her fx occurred when he was assisting her with transfer.  She feels safe in her home; but acknowledges that her spouse is limited in his ability to care give due to his dx of torn rotator cuff.    Writer called referral to [facility] and spoke w/Jennifer. She will assess pt. JUSTIN  con't to follow. Contact information left for pt as a resource.

## 2018-11-02 NOTE — PLAN OF CARE
Problem: Surgery Nonspecified (Adult)  Goal: Signs and Symptoms of Listed Potential Problems Will be Absent, Minimized or Managed (Surgery Nonspecified)  Signs and symptoms of listed potential problems will be absent, minimized or managed by discharge/transition of care (reference Surgery Nonspecified (Adult) CPG).   Outcome: Improving    VS: Temp: 99.8  F (37.7  C) Temp src: Oral BP: 104/52 Pulse: 100 Heart Rate: 81 Resp: 12    O2: SpO2: 95 % O2 Device: None (Room air)   Output: Urinary incontinence at baseline, pt willing to try Pure Wick. Verduzco removed this AM around 0820 -PVR after first void was 177mL.   Last BM: 10/31   Activity: Stood at edge of bed with Ax2   Skin: Intact - incision    Pain: Managed with 5-10mg Oxycodone q3 hours, scheduled Tylenol, and ice packs   CMS: Intact - denies numbness or tingling   Dressing: CDI   Diet: Regular diet, well tolerated. No N/V reported   LDA: PIV, SL   Equipment: Hinged knee brace, PCDs, capno   Plan: TBD - call light within reach, able to make needs known, will continue to monitor   Additional Info:

## 2018-11-02 NOTE — OR NURSING
PACU to Inpatient Nursing Handoff    Patient Marylee Woods is a 63 year old female who speaks English.   Procedure Procedure(s):  OPEN REDUCTION INTERNAL FIXATION LEFT FEMUR DISTAL   Surgeon(s) Primary: Jose Valadez MD  Resident - Assisting: Paco Gannon MD     Allergies   Allergen Reactions     Amantadine      hallucinations     Budesonide        Isolation  [unfilled]     Past Medical History   has a past medical history of Gastro-oesophageal reflux disease and Multiple sclerosis (H). She also has no past medical history of Difficult intubation; Malignant hyperthermia; PONV (postoperative nausea and vomiting); or Spinal headache.    Anesthesia General   Dermatome Level     Preop Meds Not applicable   Nerve block Not applicable   Intraop Meds dexamethasone (Decadron)  fentanyl (Sublimaze): 125 mcg total  hydromorphone (Dilaudid): 0.2 mg total  ondansetron (Zofran): last given at 1812        Local Meds yes   Antibiotics cefazolin (Ancef) - last given at 1808     Pain Patient Currently in Pain: unable to assess  Comfort: comfortably manageable  Pain Control: partially effective   PACU meds  Not applicable   PCA / epidural No   Capnography  Sent with patient   Telemetry ECG Rhythm: Normal sinus rhythm   Inpatient Telemetry Monitor Ordered? No        Labs Glucose Lab Results   Component Value Date    GLC 86 11/01/2018       Hgb Lab Results   Component Value Date    HGB 10.3 11/01/2018       INR Lab Results   Component Value Date    INR 1.03 10/31/2018      PACU Imaging Not applicable     Wound/Incision Incision/Surgical Site 11/01/18 Medial;Left Leg (Active)   Incision Assessment UTV 11/1/2018  7:06 PM   Closure Approximated;Liquid bandage;Sutures 11/1/2018  6:29 PM   Incision Drainage Amount None 11/1/2018  7:06 PM   Dressing Intervention Clean, dry, intact 11/1/2018  7:06 PM   Number of days:0       Incision/Surgical Site 11/01/18 Left;Lateral Leg (Active)   Incision Assessment UTV  11/1/2018  7:06 PM   Closure Approximated;Sutures;Liquid bandage 11/1/2018  6:29 PM   Incision Drainage Amount None 11/1/2018  7:06 PM   Dressing Intervention Clean, dry, intact 11/1/2018  7:06 PM   Number of days:0       Incision/Surgical Site 11/01/18 Lateral;Left Leg (Active)   Incision Assessment [facility] 11/1/2018  7:06 PM   Incision Drainage Amount None 11/1/2018  7:06 PM   Dressing Intervention Clean, dry, intact 11/1/2018  7:06 PM   Number of days:0       Incision/Surgical Site 11/01/18 Lateral;Left Leg (Active)   Incision Assessment [facility] 11/1/2018  7:06 PM   Incision Drainage Amount None 11/1/2018  7:06 PM   Dressing Intervention Clean, dry, intact 11/1/2018  7:06 PM   Number of days:0       Incision/Surgical Site 11/01/18 Lateral;Left Leg (Active)   Incision Assessment [facility] 11/1/2018  7:06 PM   Closure Approximated;Sutures;Liquid bandage 11/1/2018  6:29 PM   Incision Drainage Amount None 11/1/2018  7:06 PM   Dressing Intervention Clean, dry, intact 11/1/2018  7:06 PM   Number of days:0       Incision/Surgical Site 11/01/18 Lateral;Left Leg (Active)   Incision Assessment [facility] 11/1/2018  7:06 PM   Closure Approximated;Sutures;Liquid bandage 11/1/2018  6:29 PM   Incision Drainage Amount None 11/1/2018  7:06 PM   Dressing Intervention Clean, dry, intact 11/1/2018  7:06 PM   Number of days:0       Incision/Surgical Site 11/01/18 Left;Lateral Leg (Active)   Incision Assessment [facility] 11/1/2018  7:06 PM   Incision Drainage Amount None 11/1/2018  7:06 PM   Dressing Intervention Clean, dry, intact 11/1/2018  7:06 PM   Number of days:0       Incision/Surgical Site 11/01/18 Left Hip (Active)   Incision Assessment [facility] 11/1/2018  7:06 PM   Incision Drainage Amount None 11/1/2018  7:06 PM   Dressing Intervention Clean, dry, intact 11/1/2018  7:06 PM   Number of days:0      CMS Peripheral Neurovascular WDL: WDL (11/01/18 1000)      Equipment Hinged knee brace   Other LDA ETT (adult) 7 (Active)   Number of days:0        IV Access  Peripheral IV 10/31/18 Right Upper forearm (Active)   Site Assessment WDL 11/1/2018  7:06 PM   Line Status Saline locked 11/1/2018  7:06 PM   Phlebitis Scale 0-->no symptoms 11/1/2018  7:06 PM   Infiltration Scale 0 11/1/2018  7:06 PM   Infiltration Site Treatment Method  None 11/1/2018 10:00 AM   Extravasation? No 11/1/2018 10:00 AM   Number of days:1       Peripheral IV 11/01/18 Right Hand (Active)   Site Assessment [facility] 11/1/2018  7:06 PM   Line Status Saline locked 11/1/2018  7:06 PM   Phlebitis Scale 0-->no symptoms 11/1/2018  7:06 PM   Infiltration Scale 0 11/1/2018  7:06 PM   Number of days:0      Blood Products Not applicable  mL   Intake/Output Date 11/01/18 0700 - 11/02/18 0659   Shift 1329-1747 8157-7346 6783-4967 24 Hour Total   I  N  T  A  K  E   I.V. 600 1250  1850    Blood Components  300  300    Shift Total  (mL/kg) 600  (10.58) 1550  (27.34)  2150  (37.92)   O  U  T  P  U  T   Urine 350 300  650    Shift Total  (mL/kg) 350  (6.17) 300  (5.29)  650  (11.46)   Weight (kg) 56.7 56.7 56.7 56.7        Drains / Cazares Urethral Catheter Non-latex 16 fr (Active)   Tube Description UTV 11/1/2018  7:06 PM   Catheter Care Done 11/1/2018  2:00 PM   Collection Container Standard;Patent 11/1/2018  7:06 PM   Securement Method Securing device (Describe) 11/1/2018  7:06 PM   Rationale for Continued Use Other (Comment) 11/1/2018  2:00 PM   Urine Output 350 mL 11/1/2018  2:00 PM   Number of days:0      Time of void PreOp Void Prior to Procedure:  (cazares in place ) (11/01/18 1000)    PostOp      Diapered? No   Bladder Scan     PO  (sips at 1000 with meds ) (11/01/18 1400)  ice chips     Vitals    B/P: 138/62  T: 98  F (36.7  C)    Temp src: Axillary  P:  Pulse: 100 (11/01/18 1930)    Heart Rate: 89 (11/01/18 1915)     R: 17  O2:  SpO2: 100 %    O2 Device: Simple face mask (11/01/18 1915)    Oxygen Delivery: 6 LPM (11/01/18 1915)         Family/support present    Patient belongings Patient Belongings:  none  Disposition of Belongings: Kept with patient   Patient transported on bed   DC meds/scripts (obs/outpt) Not applicable   Inpatient Pain Meds Released? No       Special needs/considerations None   Tasks needing completion None       Arnaldo Cooper, RN  [facility] [id number]

## 2018-11-02 NOTE — PROGRESS NOTES
"[facility] Internal Medicine Progress Note            Interval History:   Record reviewed.  Seen with RN. S/P OPEN REDUCTION INTERNAL FIXATION LEFT FEMUR DISTAL 11/1.   ccs.    Adequate pain control with oxycodone 10 mg.  Sat and stood at bedside with therapy.  No postural dizziness.    No CP, SOB, cough, nausea, reflux, abd pain or distention.  Passing  flatus.  Last BM  3 days ago.  No void since discontinuation of cazares.         Medications:   All medications reviewed today          Physical Exam:   Blood pressure 104/52, pulse 100, temperature 99.8  F (37.7  C), temperature source Oral, resp. rate 12, height 1.727 m (5' 8\"), weight 56.7 kg (125 lb), SpO2 95 %, not currently breastfeeding.    Intake/Output Summary (Last 24 hours) at 11/02/18 1228  Last data filed at 11/02/18 0815   Gross per 24 hour   Intake             2150 ml   Output             1625 ml   Net              525 ml       General:  Alert.  Appropriate.  No distress.  Off O2.     Heent:      Neck:    Skin:    Chest:  clear    Cardiac:  Reg without gallop, murmur.  No JVD.     Abdomen:  Non distended, soft, non-tender.  BS normal.     Extremities:  Perfused.  No edema, calf, posterior thigh tenderness to suggest DVT.     Neuro:            Data:     Results for orders placed or performed during the hospital encounter of 10/31/18 (from the past 24 hour(s))   XR Surgery KARLEE L/T 5 Min Fluoro    Narrative    This exam was marked as non-reportable because it will not be read by a   radiologist or a [place] non-radiologist provider.             Hemoglobin   Result Value Ref Range    Hemoglobin 10.3 (L) 11.7 - 15.7 g/dL   XR Femur Port Left 2 Views    Narrative    XR FEMUR PORT LEFT 2 VW 11/1/2018 9:44 PM    Comparison: 10/31/2018 E radiographs    History: s/p ORIF     Findings: 4 views of the left femur. Postoperative changes of external  plate and screw fixation of distal femoral diaphyseal displaced and  comminuted fracture. Improved " alignment. No evidence of hardware  malfunction. Small joint effusion/lipohemarthrosis. Surrounding soft  tissue thickening. Postoperative changes of intramedullary alexus and  screw within the tibia. Degenerative changes of the left hip with  subchondral cystic changes on the superior acetabulum.       Impression    Impression: Expected postoperative appearance of left distal femoral  fracture fixation. Improved alignment. No hardware complication.       I have personally reviewed the examination and initial interpretation  and I agree with the findings.    GABRIELA TOLBERT MD   Basic metabolic panel   Result Value Ref Range    Sodium 142 133 - 144 mmol/L    Potassium 3.8 3.4 - 5.3 mmol/L    Chloride 105 94 - 109 mmol/L    Carbon Dioxide 32 20 - 32 mmol/L    Anion Gap 5 3 - 14 mmol/L    Glucose 95 70 - 99 mg/dL    Urea Nitrogen 20 7 - 30 mg/dL    Creatinine 1.05 (H) 0.52 - 1.04 mg/dL    GFR Estimate 53 (L) >60 mL/min/1.7m2    GFR Estimate If Black 64 >60 mL/min/1.7m2    Calcium 8.3 (L) 8.5 - 10.1 mg/dL   Magnesium   Result Value Ref Range    Magnesium 1.8 1.6 - 2.3 mg/dL   CBC with platelets   Result Value Ref Range    WBC 4.0 4.0 - 11.0 10e9/L    RBC Count 2.92 (L) 3.8 - 5.2 10e12/L    Hemoglobin 9.0 (L) 11.7 - 15.7 g/dL    Hematocrit 27.5 (L) 35.0 - 47.0 %    MCV 94 78 - 100 fl    MCH 30.8 26.5 - 33.0 pg    MCHC 32.7 31.5 - 36.5 g/dL    RDW 13.7 10.0 - 15.0 %    Platelet Count 83 (L) 150 - 450 10e9/L   Iron and iron binding capacity   Result Value Ref Range    Iron 14 (L) 35 - 180 ug/dL    Iron Binding Cap 161 (L) 240 - 430 ug/dL    Iron Saturation Index 9 (L) 15 - 46 %   Ferritin   Result Value Ref Range    Ferritin 223 8 - 252 ng/mL   Vitamin B12   Result Value Ref Range    Vitamin B12 564 193 - 986 pg/mL   Folate   Result Value Ref Range    Folate 59.8 >5.4 ng/mL   TSH   Result Value Ref Range    TSH 1.47 0.40 - 4.00 mU/L              Assessment and Plan:   1.  Left distal femur fracture 10/31.  S/P ORIF.   Adequate pain control.    2.  Advanced multiple sclerosis with predominant left-sided weakness.  Slow to mobilize.   3.  Essential hypertension, low normal off AM hydrochlorothiazide.   4.  History of recurrent urinary tract infection.  On  Macrobid for suppression.  Urine culture 09/04/2018 did demonstrate greater than 100,000 colonies of Escherichia coli.   5.  Neurovesical dysfunction per record, with more in the way of urge incontinence, on oxybutynin.  Monitor for retention post operatively.   6.  Bilateral lower extremity edema, likely related to venous insufficiency, left greater than right, on lymphedema stockings.  Not an issue presently.   7.  Superficial clot, right upper extremity, in association with PICC line, 2011.   8.  History of pancytopenia attributed to prior treatment for multiple sclerosis.   9.  History of mood and sleep disorder, per record.   10.  Painful paresthesias on gabapentin.   11.  Status post laparoscopic cholecystectomy.   12.  Open reduction, internal fixation left tibia-fibula fracture 04/14/2013.   13.  Presumed ORIF right femur fracture, 2009.   14.  Thyroid biopsy.  Benign. Normal TSH.   15.  Acute blood loss superimposed on normochromic normocytic anemia, likely on the basis of CD per iron studies.  Cannot exclude component of iron deficiency.  Ferritin value may represent an acute phase reactant.   16.  No known coronary disease with limited functional capacity due to MS.    PLAN:  1)  Adjust IV with low normal BP.  2)  Resume valacyclovir (on chronically).  Defer iron therapy with constipation. Continue macrobid.  Adjust gabapentin dose to   HS.  3)  Same opiates, miralax, supp later if no BM, lovenox, mobilize.  4)  Trend labs.  5)  Monitor clinically.   Disposition Plan   Expected discharge in 2-3 days to transitional care unit vs home with PCA services once mobilizing safely, stable medically.      Entered: Domenico Luna 11/02/2018, 12:28 PM               Attestation:  I have reviewed today's vital signs, notes, medications, labs and imaging.     Domenico Luna MD

## 2018-11-02 NOTE — PROGRESS NOTES
"Orthopedics Daily Progress Note    S: no acute events overnight, Tmax 100.7. pain controlled with IV meds. Tolerating clears without n/v. Denies new n/t or chest pain/shortness of breath.     O: /45 (BP Location: Left arm)  Pulse 100  Temp 100.1  F (37.8  C) (Oral)  Resp 17  Ht 1.727 m (5' 8\")  Wt 56.7 kg (125 lb)  LMP  (LMP Unknown)  SpO2 93%  BMI 19.01 kg/m2    No acute distress  Non-labored respiration  LLE: HKB and ace in place, c,d,i, wiggles toes and ankle, limited by pain, does not vic the foot (baseline). sensation intact in deep peroneal, superficial peroneal, tibial, sural, saphenous nerve distributions, foot swollen, wwp    Labs:    Recent Labs  Lab 11/02/18 0521 11/01/18  0556 10/31/18  1859    143 144   POTASSIUM 3.8 3.8 3.7   CHLORIDE 105 107 106   CO2 32 32 33*   BUN 20 20 22   CR 1.05* 0.83 1.00   GLC 95 86 98   MAG 1.8  --   --        Recent Labs  Lab 11/02/18 0521 11/01/18  1916 11/01/18  0556 10/31/18  1859   WBC 4.0  --  4.3 7.1   HGB 9.0* 10.3* 9.6* 9.9*   PLT 83*  --  105* 124*       Recent Labs  Lab 10/31/18  1859   INR 1.03       A/P:   Marylee Woods is a 63 year old female with MS who sustained a distal femur fracture on the left during a mechanical fall on 10/31 now s/p ORIF on 11/1.    -Ortho primary   -WB: TTWB LLE  -Activity: with assist and walker   -DVT ppx: lovenox x 4 weeks   -Abx: ancef x 24 hrs   -PT/OT  -Brace: hinged knee brace unlocked 0-40 degrees  -Pain: PO as tolerated with IV for breakthrough; toradol discontinued.  -Diet: ADAT   -Labs: recheck CBC and BMP  -Acute on chronic anemia: 1 uRBC in the OR on 11/1.    -Consults: hospitalist, SW   -Follow up: 2 weeks with Dr. Valadez for a wound check   -Dispo: pending PT, pain and medical clearance, home vs TCU in 1-2 days    Paco Gannon MD   Orthopedic Surgery; PGY-4  Pager: [phone]    For any questions regarding this patient please page me prior to paging the ortho resident on call.    "

## 2018-11-03 ENCOUNTER — APPOINTMENT (OUTPATIENT)
Dept: PHYSICAL THERAPY | Facility: CLINIC | Age: 63
DRG: 481 | End: 2018-11-03
Payer: MEDICARE

## 2018-11-03 ENCOUNTER — APPOINTMENT (OUTPATIENT)
Dept: OCCUPATIONAL THERAPY | Facility: CLINIC | Age: 63
DRG: 481 | End: 2018-11-03
Payer: MEDICARE

## 2018-11-03 LAB
ANION GAP SERPL CALCULATED.3IONS-SCNC: 6 MMOL/L (ref 3–14)
BUN SERPL-MCNC: 17 MG/DL (ref 7–30)
CALCIUM SERPL-MCNC: 8.4 MG/DL (ref 8.5–10.1)
CHLORIDE SERPL-SCNC: 105 MMOL/L (ref 94–109)
CO2 SERPL-SCNC: 32 MMOL/L (ref 20–32)
CREAT SERPL-MCNC: 1.01 MG/DL (ref 0.52–1.04)
GFR SERPL CREATININE-BSD FRML MDRD: 55 ML/MIN/1.7M2
GLUCOSE SERPL-MCNC: 101 MG/DL (ref 70–99)
HGB BLD-MCNC: 8 G/DL (ref 11.7–15.7)
POTASSIUM SERPL-SCNC: 4 MMOL/L (ref 3.4–5.3)
SODIUM SERPL-SCNC: 143 MMOL/L (ref 133–144)

## 2018-11-03 PROCEDURE — 25000132 ZZH RX MED GY IP 250 OP 250 PS 637: Mod: GY | Performed by: STUDENT IN AN ORGANIZED HEALTH CARE EDUCATION/TRAINING PROGRAM

## 2018-11-03 PROCEDURE — 99232 SBSQ HOSP IP/OBS MODERATE 35: CPT | Performed by: INTERNAL MEDICINE

## 2018-11-03 PROCEDURE — 40000193 ZZH STATISTIC PT WARD VISIT: Performed by: PHYSICAL THERAPIST

## 2018-11-03 PROCEDURE — A9270 NON-COVERED ITEM OR SERVICE: HCPCS | Mod: GY | Performed by: INTERNAL MEDICINE

## 2018-11-03 PROCEDURE — 36415 COLL VENOUS BLD VENIPUNCTURE: CPT | Performed by: INTERNAL MEDICINE

## 2018-11-03 PROCEDURE — 25000132 ZZH RX MED GY IP 250 OP 250 PS 637: Mod: GY | Performed by: INTERNAL MEDICINE

## 2018-11-03 PROCEDURE — 12000001 ZZH R&B MED SURG/OB UMMC

## 2018-11-03 PROCEDURE — 25000132 ZZH RX MED GY IP 250 OP 250 PS 637: Mod: GY | Performed by: ORTHOPAEDIC SURGERY

## 2018-11-03 PROCEDURE — 40000133 ZZH STATISTIC OT WARD VISIT

## 2018-11-03 PROCEDURE — 97530 THERAPEUTIC ACTIVITIES: CPT | Mod: GP | Performed by: PHYSICAL THERAPIST

## 2018-11-03 PROCEDURE — 80048 BASIC METABOLIC PNL TOTAL CA: CPT | Performed by: INTERNAL MEDICINE

## 2018-11-03 PROCEDURE — A9270 NON-COVERED ITEM OR SERVICE: HCPCS | Mod: GY | Performed by: ORTHOPAEDIC SURGERY

## 2018-11-03 PROCEDURE — A9270 NON-COVERED ITEM OR SERVICE: HCPCS | Mod: GY | Performed by: STUDENT IN AN ORGANIZED HEALTH CARE EDUCATION/TRAINING PROGRAM

## 2018-11-03 PROCEDURE — 85018 HEMOGLOBIN: CPT | Performed by: INTERNAL MEDICINE

## 2018-11-03 PROCEDURE — 97110 THERAPEUTIC EXERCISES: CPT | Mod: GP | Performed by: PHYSICAL THERAPIST

## 2018-11-03 PROCEDURE — 97530 THERAPEUTIC ACTIVITIES: CPT | Mod: GO

## 2018-11-03 RX ORDER — BISACODYL 10 MG
10 SUPPOSITORY, RECTAL RECTAL ONCE
Status: COMPLETED | OUTPATIENT
Start: 2018-11-03 | End: 2018-11-03

## 2018-11-03 RX ADMIN — GABAPENTIN 900 MG: 300 CAPSULE ORAL at 22:21

## 2018-11-03 RX ADMIN — MULTIPLE VITAMINS W/ MINERALS TAB 1 TABLET: TAB at 08:05

## 2018-11-03 RX ADMIN — CHOLECALCIFEROL CAP 125 MCG (5000 UNIT) 5000 UNITS: 125 CAP at 08:05

## 2018-11-03 RX ADMIN — OXYBUTYNIN CHLORIDE 10 MG: 5 TABLET, EXTENDED RELEASE ORAL at 16:51

## 2018-11-03 RX ADMIN — NITROFURANTOIN (MONOHYDRATE/MACROCRYSTALS) 100 MG: 75; 25 CAPSULE ORAL at 08:05

## 2018-11-03 RX ADMIN — GABAPENTIN 600 MG: 300 CAPSULE ORAL at 08:04

## 2018-11-03 RX ADMIN — OXYCODONE HYDROCHLORIDE 5 MG: 5 TABLET ORAL at 09:03

## 2018-11-03 RX ADMIN — OXYCODONE HYDROCHLORIDE 5 MG: 5 TABLET ORAL at 12:27

## 2018-11-03 RX ADMIN — HYDROXYZINE HYDROCHLORIDE 25 MG: 25 TABLET ORAL at 12:27

## 2018-11-03 RX ADMIN — ACETAMINOPHEN 975 MG: 325 TABLET, FILM COATED ORAL at 16:50

## 2018-11-03 RX ADMIN — OXYCODONE HYDROCHLORIDE 5 MG: 5 TABLET ORAL at 05:15

## 2018-11-03 RX ADMIN — SERTRALINE HYDROCHLORIDE 150 MG: 100 TABLET ORAL at 19:59

## 2018-11-03 RX ADMIN — OXYBUTYNIN CHLORIDE 10 MG: 5 TABLET, EXTENDED RELEASE ORAL at 08:04

## 2018-11-03 RX ADMIN — BISACODYL 10 MG: 10 SUPPOSITORY RECTAL at 14:34

## 2018-11-03 RX ADMIN — OMEPRAZOLE 40 MG: 20 CAPSULE, DELAYED RELEASE ORAL at 08:05

## 2018-11-03 RX ADMIN — BACLOFEN 20 MG: 20 TABLET ORAL at 08:05

## 2018-11-03 RX ADMIN — ACETAMINOPHEN 975 MG: 325 TABLET, FILM COATED ORAL at 09:03

## 2018-11-03 RX ADMIN — VALACYCLOVIR HYDROCHLORIDE 500 MG: 500 TABLET, FILM COATED ORAL at 19:58

## 2018-11-03 RX ADMIN — BACLOFEN 30 MG: 20 TABLET ORAL at 19:59

## 2018-11-03 RX ADMIN — VALACYCLOVIR HYDROCHLORIDE 500 MG: 500 TABLET, FILM COATED ORAL at 08:04

## 2018-11-03 RX ADMIN — POLYETHYLENE GLYCOL 3350 17 G: 17 POWDER, FOR SOLUTION ORAL at 08:04

## 2018-11-03 ASSESSMENT — ACTIVITIES OF DAILY LIVING (ADL)
ADLS_ACUITY_SCORE: 21
ADLS_ACUITY_SCORE: 25
ADLS_ACUITY_SCORE: 21
ADLS_ACUITY_SCORE: 25
ADLS_ACUITY_SCORE: 25
ADLS_ACUITY_SCORE: 21

## 2018-11-03 NOTE — PROGRESS NOTES
"[facility] Internal Medicine Progress Note            Interval History:   Record reviewed.  Seen with RN. S/P ORIF left distal femur fracture 11/1.   ccs.  Adequate pain control on oxycodone 5-10 mg.  Sat  at bedside this AM.    No postural dizziness.    No CP, SOB, cough, nausea, reflux, abd pain or distention.  Tolerating diet.  Last BM 4 days ago.  Has pure wick in place to keep dry.          Medications:   All medications reviewed today          Physical Exam:   Blood pressure 128/58, pulse 103, temperature 100.8  F (38.2  C), temperature source Oral, resp. rate 16, height 1.727 m (5' 8\"), weight 56.7 kg (125 lb), SpO2 94 %, not currently breastfeeding.    Intake/Output Summary (Last 24 hours) at 11/02/18 1228  Last data filed at 11/02/18 0815   Gross per 24 hour   Intake             2150 ml   Output             1625 ml   Net              525 ml          General:  Alert.  Appropriate.  Good spirits. No distress.  Off O2.     Heent:      Neck:    Skin:    Chest:  clear    Cardiac:  Reg without gallop, murmur.  No JVD.     Abdomen:  Non distended, soft, non-tender.  BS normal.     Extremities:  Perfused.  No edema, calf, posterior thigh tenderness to suggest DVT.     Neuro:            Data:     Results for orders placed or performed during the hospital encounter of 10/31/18 (from the past 24 hour(s))   Basic metabolic panel   Result Value Ref Range    Sodium 143 133 - 144 mmol/L    Potassium 4.0 3.4 - 5.3 mmol/L    Chloride 105 94 - 109 mmol/L    Carbon Dioxide 32 20 - 32 mmol/L    Anion Gap 6 3 - 14 mmol/L    Glucose 101 (H) 70 - 99 mg/dL    Urea Nitrogen 17 7 - 30 mg/dL    Creatinine 1.01 0.52 - 1.04 mg/dL    GFR Estimate 55 (L) >60 mL/min/1.7m2    GFR Estimate If Black 67 >60 mL/min/1.7m2    Calcium 8.4 (L) 8.5 - 10.1 mg/dL   Hemoglobin   Result Value Ref Range    Hemoglobin 8.0 (L) 11.7 - 15.7 g/dL              Assessment and Plan:   1.  Left distal femur fracture 10/31.  S/P ORIF.  Adequate pain " control.    2.  Advanced multiple sclerosis with predominant left-sided weakness.  Slow to mobilize.   3.  Essential hypertension, adequate off AM hydrochlorothiazide.   4.  History of recurrent urinary tract infection.  On  Macrobid for suppression.  Urine culture 09/04/2018 did demonstrate greater than 100,000 colonies of Escherichia coli.  F/U UC neg 11/1.   5.  Neurovesical dysfunction per record, with more in the way of urge incontinence, on oxybutynin. On pure wick as above.   6.  Bilateral lower extremity edema, likely related to venous insufficiency, left greater than right, on lymphedema stockings.  Not an issue presently.  No suggestion of DVT.   7.  Superficial clot, right upper extremity, in association with PICC line, 2011.   8.  History of pancytopenia attributed to prior treatment for multiple sclerosis.   9.  History of mood and sleep disorder, per record.   10.  Painful paresthesias on gabapentin.   11.  Status post laparoscopic cholecystectomy.   12.  Open reduction, internal fixation left tibia-fibula fracture 04/14/2013.   13.  Presumed ORIF right femur fracture, 2009.   14.  Thyroid biopsy.  Benign. Normal TSH.   15.  Acute blood loss superimposed on normochromic normocytic anemia, likely on the basis of CD per iron studies.  Cannot exclude component of iron deficiency.  Ferritin value may represent an acute phase reactant.  Adequate.   16.  No known coronary disease with limited functional capacity due to MS.    PLAN:  1)  SL IV.   2)  Resumed valacyclovir (on chronically).  Deferred iron therapy with constipation. Continue macrobid.  Adjusted gabapentin dose to   HS.  3)  Same opiates, miralax, supp later if no BM, lovenox, mobilize.  4)  Trend labs.  5)  Monitor clinically.   Disposition Plan   Expected discharge in approx 2 days to transitional care unit vs home with PCA services once mobilizing safely, stable medically.      Entered: Domenico Luna 11/03/2018, 11:06 AM               Attestation:  I have reviewed today's vital signs, notes, medications, labs and imaging.     Domenico Luna MD

## 2018-11-03 NOTE — PROGRESS NOTES
"Orthopedics Daily Progress Note    S: no acute events overnight, Tmax 101.2. Pain notable, controlled with orals. Tolerating clears without n/v. Denies new n/t or chest pain/shortness of breath.     O: /58 (BP Location: Left arm)  Pulse 103  Temp 100.8  F (38.2  C) (Oral)  Resp 16  Ht 1.727 m (5' 8\")  Wt 56.7 kg (125 lb)  LMP  (LMP Unknown)  SpO2 94%  BMI 19.01 kg/m2    No acute distress  Non-labored respiration  LLE: HKB and ace in place, dressing laterally c,d,i, wiggles toes and ankle, limited by pain, does not vic the foot (baseline). sensation intact in deep peroneal, superficial peroneal, tibial, sural, saphenous nerve distributions, foot swollen, wwp; foot and ankle with increased swelling, ankle sore     Labs:    Recent Labs  Lab 11/03/18  0501 11/02/18  0521 11/01/18  0556 10/31/18  1859    142 143 144   POTASSIUM 4.0 3.8 3.8 3.7   CHLORIDE 105 105 107 106   CO2 32 32 32 33*   BUN 17 20 20 22   CR 1.01 1.05* 0.83 1.00   * 95 86 98   MAG  --  1.8  --   --        Recent Labs  Lab 11/03/18  0501 11/02/18  0521 11/01/18  1916 11/01/18  0556 10/31/18  1859   WBC  --  4.0  --  4.3 7.1   HGB 8.0* 9.0* 10.3* 9.6* 9.9*   PLT  --  83*  --  105* 124*       Recent Labs  Lab 10/31/18  1859   INR 1.03       A/P:   Marylee Woods is a 63 year old female with MS who sustained a distal femur fracture on the left during a mechanical fall on 10/31 now s/p ORIF on 11/1.    -Ortho primary   -WB: TTWB LLE  -Activity: with assist and walker   -DVT ppx: lovenox x 4 weeks   -Abx: ancef x 24 hrs   -Post op fever, 101.2 tmax in 24 hrs, encourage IS, no infectious workup at this time from ortho standpoint.  -PT/OT  -Brace: hinged knee brace unlocked 0-40 degrees  -Pain: PO as tolerated with IV for breakthrough  -Diet: ADAT   -Labs: follow hgb  -Acute on chronic anemia: 1 uRBC in the OR on 11/1.    -Consults: hospitalist, JUSTIN   -Follow up: 2 weeks with Dr. Valadez for a wound check   -Dispo: pending PT, pain " and medical clearance, home vs TCU in 1-2 days    Paco Gannon MD   Orthopedic Surgery; PGY-4  Pager: [phone]    For any questions regarding this patient please page me prior to paging the ortho resident on call.

## 2018-11-03 NOTE — PLAN OF CARE
Problem: Surgery Nonspecified (Adult)  Goal: Signs and Symptoms of Listed Potential Problems Will be Absent, Minimized or Managed (Surgery Nonspecified)  Signs and symptoms of listed potential problems will be absent, minimized or managed by discharge/transition of care (reference Surgery Nonspecified (Adult) CPG).   Outcome: Improving  VS: Temp: 100.8  F (38.2  C) Temp src: Oral BP: 128/58 Pulse: 103 Heart Rate: 99 Resp: 16      O2: SpO2: 94 % O2 Device: None (Room air)   Output: Urinary incontinence at baseline, Pure Wick in place.   Last BM: 10/31 - pt wants suppository after therapy   Activity: Stood at edge of bed with Ax2   Skin: Intact - incision    Pain: Managed with 5-10mg Oxycodone q3 hours, scheduled Tylenol, and ice packs   CMS: Intact - denies new numbness or tingling, baseline neuropathy in all extremities    Dressing: CDI   Diet: Regular diet, well tolerated. No N/V reported   LDA: Pure wick connected to 40mmHg suction    Equipment: Hinged knee brace, PCDs   Plan: TBD - call light within reach, able to make needs known, will continue to monitor   Additional Info:

## 2018-11-03 NOTE — PLAN OF CARE
Problem: Surgery Nonspecified (Adult)  Goal: Signs and Symptoms of Listed Potential Problems Will be Absent, Minimized or Managed (Surgery Nonspecified)  Signs and symptoms of listed potential problems will be absent, minimized or managed by discharge/transition of care (reference Surgery Nonspecified (Adult) CPG).   Outcome: Therapy, progress toward functional goals as expected  Pt A/O X 4. Afebrile. VSS. Lungs- clear bilaterally with both anterior and posterior. IS encouraged. Bowels- active in all four quadrants BM 10/30/18, refused suppository. CMS and Neuro's are intact. Baseline numbness and tingling in LE per patient report (MS) require assistive two . Denies nausea, shortness of breath, and chest pain. Has pain in the left leg  and given Oxycodone and 0.5mg dilaudid , ICE applied, and is tolerating well. Baseline incontinence, purewick is in use, at 40 continues suction. . Is on a regular diet and appetite was good this shift. LLE incisional dressing is clean, dry and intact. Has Hinge knee brace on at all time 0-40 degrees  Skin checked under and top ACE wrap checked tightens of straps .  PIV is patent in the right forearm and LR  infusing at 75ml/hr continuous . Pt refuses PCD's. Bilateral heels are elevated off the bed with pillow. Pt is able to make needs known and the call light is within the pt's reach. Continue to monitor.

## 2018-11-03 NOTE — PLAN OF CARE
Problem: Surgery Nonspecified (Adult)  Goal: Signs and Symptoms of Listed Potential Problems Will be Absent, Minimized or Managed (Surgery Nonspecified)  Signs and symptoms of listed potential problems will be absent, minimized or managed by discharge/transition of care (reference Surgery Nonspecified (Adult) CPG).   Pt A/O X 4. Afebrile. VSS. Lungs- clear bilaterally with both anterior and posterior. IS encouraged. Bowels- active in all four quadrants. CMS and Neuro's are intact. Base line numbness and tingling in LE (MS). Denies nausea, shortness of breath, and chest pain. Has pain in the left leg and rate pain 10/10 and given oxycodone, and  Dilaudid , ICE applied, and is tolerating well . Baseline incontinence Purewick is in use in continuously.  Is on a regular  diet and appetite was good this shift.  Knee incisional dressing is clean, dry and intact. PIV is patent in the right upper forearm and and infusing LR 75 mL/hr.  Bilateral heels are elevated off the bed with pillow. Pt is able to make needs known and the call light is within the pt's reach. Continue to monitor.

## 2018-11-03 NOTE — PLAN OF CARE
Problem: Patient Care Overview  Goal: Plan of Care/Patient Progress Review  Pt seen by PT, she is sleepy this AM.  Discussed chair transfers with either slideboard or lift if pt needing to get to WC or chair.  Pt did attempt standing but max A of 2 to get up for abut 10s and mod A to maintain. Pt at total assist for bed mobility. Pt limited by pain.    Discharge Planner PT   Patient plan for discharge: TCU, pt now in agreement  Current status: pt total assist for bed mobility, min A seating balance, sit to stand max A of 2  Barriers to return to prior living situation: mobility, TTWB, pain  Recommendations for discharge: TCU  Rationale for recommendations: Pt needing heavy assist of 2, has not been able to transfer to chair yet, will need rehab       Entered by: Cristina Vargas 11/03/2018 9:49 AM

## 2018-11-03 NOTE — PLAN OF CARE
Problem: Patient Care Overview  Goal: Plan of Care/Patient Progress Review  Discharge Planner OT   Patient plan for discharge: Did not state  Current status: Max Ax2 for supine<>sit. Patient sat EOB to engage in core strengthening activity. Standing deferred due to sleepiness.   Barriers to return to prior living situation: level of assist for mobility and ADLs, pain, precautions,  cannot physically assist due to RCT  Recommendations for discharge: TCU  Rationale for recommendations: To maximize safety and IND with functional mobility and ADLs/IADLs       Entered by: YENIFER CORONA 11/03/2018 1:51 PM

## 2018-11-04 ENCOUNTER — APPOINTMENT (OUTPATIENT)
Dept: OCCUPATIONAL THERAPY | Facility: CLINIC | Age: 63
DRG: 481 | End: 2018-11-04
Payer: MEDICARE

## 2018-11-04 ENCOUNTER — APPOINTMENT (OUTPATIENT)
Dept: PHYSICAL THERAPY | Facility: CLINIC | Age: 63
DRG: 481 | End: 2018-11-04
Payer: MEDICARE

## 2018-11-04 LAB
HGB BLD-MCNC: 8 G/DL (ref 11.7–15.7)
PLATELET # BLD AUTO: 83 10E9/L (ref 150–450)

## 2018-11-04 PROCEDURE — 36415 COLL VENOUS BLD VENIPUNCTURE: CPT | Performed by: STUDENT IN AN ORGANIZED HEALTH CARE EDUCATION/TRAINING PROGRAM

## 2018-11-04 PROCEDURE — A9270 NON-COVERED ITEM OR SERVICE: HCPCS | Mod: GY | Performed by: STUDENT IN AN ORGANIZED HEALTH CARE EDUCATION/TRAINING PROGRAM

## 2018-11-04 PROCEDURE — 40000193 ZZH STATISTIC PT WARD VISIT: Performed by: PHYSICAL THERAPIST

## 2018-11-04 PROCEDURE — 99232 SBSQ HOSP IP/OBS MODERATE 35: CPT | Performed by: INTERNAL MEDICINE

## 2018-11-04 PROCEDURE — 85018 HEMOGLOBIN: CPT | Performed by: STUDENT IN AN ORGANIZED HEALTH CARE EDUCATION/TRAINING PROGRAM

## 2018-11-04 PROCEDURE — 25000132 ZZH RX MED GY IP 250 OP 250 PS 637: Mod: GY | Performed by: STUDENT IN AN ORGANIZED HEALTH CARE EDUCATION/TRAINING PROGRAM

## 2018-11-04 PROCEDURE — 25000132 ZZH RX MED GY IP 250 OP 250 PS 637: Mod: GY | Performed by: INTERNAL MEDICINE

## 2018-11-04 PROCEDURE — 97530 THERAPEUTIC ACTIVITIES: CPT | Mod: GO

## 2018-11-04 PROCEDURE — 25000128 H RX IP 250 OP 636: Performed by: STUDENT IN AN ORGANIZED HEALTH CARE EDUCATION/TRAINING PROGRAM

## 2018-11-04 PROCEDURE — 97530 THERAPEUTIC ACTIVITIES: CPT | Mod: GP | Performed by: PHYSICAL THERAPIST

## 2018-11-04 PROCEDURE — 97535 SELF CARE MNGMENT TRAINING: CPT | Mod: GO

## 2018-11-04 PROCEDURE — 12000001 ZZH R&B MED SURG/OB UMMC

## 2018-11-04 PROCEDURE — A9270 NON-COVERED ITEM OR SERVICE: HCPCS | Mod: GY | Performed by: INTERNAL MEDICINE

## 2018-11-04 PROCEDURE — 85049 AUTOMATED PLATELET COUNT: CPT | Performed by: STUDENT IN AN ORGANIZED HEALTH CARE EDUCATION/TRAINING PROGRAM

## 2018-11-04 PROCEDURE — 40000133 ZZH STATISTIC OT WARD VISIT

## 2018-11-04 RX ORDER — POLYETHYLENE GLYCOL 3350 17 G/17G
17 POWDER, FOR SOLUTION ORAL 2 TIMES DAILY
Status: DISCONTINUED | OUTPATIENT
Start: 2018-11-04 | End: 2018-11-06 | Stop reason: HOSPADM

## 2018-11-04 RX ORDER — METHOCARBAMOL 500 MG/1
250 TABLET ORAL 4 TIMES DAILY PRN
Status: DISCONTINUED | OUTPATIENT
Start: 2018-11-04 | End: 2018-11-06

## 2018-11-04 RX ORDER — METHOCARBAMOL 500 MG/1
500 TABLET, FILM COATED ORAL 3 TIMES DAILY
Status: DISCONTINUED | OUTPATIENT
Start: 2018-11-04 | End: 2018-11-04

## 2018-11-04 RX ORDER — METHOCARBAMOL 500 MG/1
500 TABLET, FILM COATED ORAL 4 TIMES DAILY PRN
Status: DISCONTINUED | OUTPATIENT
Start: 2018-11-04 | End: 2018-11-04

## 2018-11-04 RX ADMIN — OXYBUTYNIN CHLORIDE 10 MG: 5 TABLET, EXTENDED RELEASE ORAL at 07:54

## 2018-11-04 RX ADMIN — GABAPENTIN 900 MG: 300 CAPSULE ORAL at 22:19

## 2018-11-04 RX ADMIN — Medication 5 MG: at 17:29

## 2018-11-04 RX ADMIN — OXYCODONE HYDROCHLORIDE 5 MG: 5 TABLET ORAL at 08:05

## 2018-11-04 RX ADMIN — ACETAMINOPHEN 975 MG: 325 TABLET, FILM COATED ORAL at 08:04

## 2018-11-04 RX ADMIN — GABAPENTIN 600 MG: 300 CAPSULE ORAL at 07:54

## 2018-11-04 RX ADMIN — ENOXAPARIN SODIUM 40 MG: 40 INJECTION SUBCUTANEOUS at 00:04

## 2018-11-04 RX ADMIN — METHOCARBAMOL 500 MG: 500 TABLET ORAL at 08:51

## 2018-11-04 RX ADMIN — CHOLECALCIFEROL CAP 125 MCG (5000 UNIT) 5000 UNITS: 125 CAP at 07:55

## 2018-11-04 RX ADMIN — MULTIPLE VITAMINS W/ MINERALS TAB 1 TABLET: TAB at 07:54

## 2018-11-04 RX ADMIN — SERTRALINE HYDROCHLORIDE 150 MG: 100 TABLET ORAL at 19:30

## 2018-11-04 RX ADMIN — OXYBUTYNIN CHLORIDE 10 MG: 5 TABLET, EXTENDED RELEASE ORAL at 15:45

## 2018-11-04 RX ADMIN — VALACYCLOVIR HYDROCHLORIDE 500 MG: 500 TABLET, FILM COATED ORAL at 07:54

## 2018-11-04 RX ADMIN — ACETAMINOPHEN 975 MG: 325 TABLET, FILM COATED ORAL at 00:04

## 2018-11-04 RX ADMIN — OMEPRAZOLE 40 MG: 20 CAPSULE, DELAYED RELEASE ORAL at 07:55

## 2018-11-04 RX ADMIN — POLYETHYLENE GLYCOL 3350 17 G: 17 POWDER, FOR SOLUTION ORAL at 19:29

## 2018-11-04 RX ADMIN — VALACYCLOVIR HYDROCHLORIDE 500 MG: 500 TABLET, FILM COATED ORAL at 19:30

## 2018-11-04 RX ADMIN — ACETAMINOPHEN 975 MG: 325 TABLET, FILM COATED ORAL at 17:29

## 2018-11-04 RX ADMIN — POLYETHYLENE GLYCOL 3350 17 G: 17 POWDER, FOR SOLUTION ORAL at 07:56

## 2018-11-04 RX ADMIN — Medication 35 MG: at 19:30

## 2018-11-04 RX ADMIN — NITROFURANTOIN (MONOHYDRATE/MACROCRYSTALS) 100 MG: 75; 25 CAPSULE ORAL at 07:54

## 2018-11-04 RX ADMIN — BACLOFEN 20 MG: 20 TABLET ORAL at 07:54

## 2018-11-04 ASSESSMENT — ACTIVITIES OF DAILY LIVING (ADL)
ADLS_ACUITY_SCORE: 21

## 2018-11-04 NOTE — PROGRESS NOTES
"[facility] Internal Medicine Progress Note            Interval History:   Record reviewed.  Seen with RN.  S/P ORIF left distal femur fracture 11/1.   ccs.  Adequate pain control on oxycodone 5 mg and robaxin 500 mg added this AM for spasm.  Sedated when seen.   Sat  at bedside this AM. No postural dizziness.    No CP, SOB, cough, nausea, reflux, abd pain or distention.  Tolerating diet.  Last BM 5 days ago.  Supp this AM.  No results thus far. Typically has BM 1-2 times per week.  Not desiring fleets.  Has pure wick in place to keep dry. Bladder scan 90.          Medications:   All medications reviewed today          Physical Exam:   Blood pressure 127/62, pulse 103, temperature 99.6  F (37.6  C), temperature source Oral, resp. rate 16, height 1.727 m (5' 8\"), weight 56.7 kg (125 lb), SpO2 97 %, not currently breastfeeding.    Intake/Output Summary (Last 24 hours) at 11/02/18 1228  Last data filed at 11/02/18 0815   Gross per 24 hour   Intake             2150 ml   Output             1625 ml   Net              525 ml   I/O this shift:  In: -   Out: 700 [Urine:700]      General:  Somnolent.  Tending to nod off.   Appropriate.  No distress.  Off O2.     Heent:      Neck:    Skin:    Chest:  clear    Cardiac:  Reg without gallop, murmur.  No JVD.     Abdomen:  Non distended, soft, non-tender.  BS normal.     Extremities:  Perfused.  No edema, calf, posterior thigh tenderness to suggest DVT.     Neuro:            Data:     Results for orders placed or performed during the hospital encounter of 10/31/18 (from the past 24 hour(s))   Hemoglobin   Result Value Ref Range    Hemoglobin 8.0 (L) 11.7 - 15.7 g/dL   Platelet count   Result Value Ref Range    Platelet Count 83 (L) 150 - 450 10e9/L     Last Comprehensive Metabolic Panel:  Sodium   Date Value Ref Range Status   11/03/2018 143 133 - 144 mmol/L Final     Potassium   Date Value Ref Range Status   11/03/2018 4.0 3.4 - 5.3 mmol/L Final     Chloride   Date " Value Ref Range Status   11/03/2018 105 94 - 109 mmol/L Final     Carbon Dioxide   Date Value Ref Range Status   11/03/2018 32 20 - 32 mmol/L Final     Anion Gap   Date Value Ref Range Status   11/03/2018 6 3 - 14 mmol/L Final     Glucose   Date Value Ref Range Status   11/03/2018 101 (H) 70 - 99 mg/dL Final     Urea Nitrogen   Date Value Ref Range Status   11/03/2018 17 7 - 30 mg/dL Final     Creatinine   Date Value Ref Range Status   11/03/2018 1.01 0.52 - 1.04 mg/dL Final     GFR Estimate   Date Value Ref Range Status   11/03/2018 55 (L) >60 mL/min/1.7m2 Final     Comment:     Non  GFR Calc     Calcium   Date Value Ref Range Status   11/03/2018 8.4 (L) 8.5 - 10.1 mg/dL Final     Hemoglobin   Date Value Ref Range Status   11/04/2018 8.0 (L) 11.7 - 15.7 g/dL Final   11/03/2018 8.0 (L) 11.7 - 15.7 g/dL Final                  Assessment and Plan:   1.  Left distal femur fracture 10/31.  S/P ORIF.  Adequate pain control.  Opiate and robaxin associated sedation.   2.  Advanced multiple sclerosis with predominant left-sided weakness.  Slow to mobilize. Indicates no disease worsening with ordeal of surgery, fracture.   3.  Essential hypertension, adequate off AM hydrochlorothiazide.   4.  History of recurrent urinary tract infection.  On  Macrobid for suppression.  Urine culture 09/04/2018 did demonstrate greater than 100,000 colonies of Escherichia coli.  F/U UC neg 11/1.   5.  Neurovesical dysfunction per record, with more in the way of urge incontinence, on oxybutynin. On pure wick as above.  Bladder scans OK on oxybutinin.   6.  Bilateral lower extremity edema, likely related to venous insufficiency, left greater than right, on lymphedema stockings.  Not an issue presently.  No suggestion of DVT.   7.  Superficial clot, right upper extremity, in association with PICC line, 2011.   8.  History of pancytopenia attributed to prior treatment for multiple sclerosis.   9.  History of mood and sleep  disorder, per record.   10.  Painful paresthesias on gabapentin.   11.  Status post laparoscopic cholecystectomy.   12.  Open reduction, internal fixation left tibia-fibula fracture 04/14/2013.   13.  Presumed ORIF right femur fracture, 2009.   14.  Thyroid biopsy.  Benign. Normal TSH.   15.  Acute blood loss superimposed on normochromic normocytic anemia, likely on the basis of CD per iron studies.  Cannot exclude component of iron deficiency.  Ferritin value may represent an acute phase reactant.  Adequate.   16.  No known coronary disease with limited functional capacity due to MS.  17.  Constipation, chronic 2nd to MS, aggravated by opiates.      PLAN:  1) Increase miralax.  To try prunes.  Consider enema if no results.   2)  Resumed valacyclovir (on chronically).  Deferred iron therapy with constipation. Continue macrobid.  Adjusted gabapentin dose to   HS.  3)  With sedation decrease oxycodone to 2.5-5 mg dose. Decrease robaxin to 250 mg.   lovenox, mobilize.  4)  Trend labs.  5)  Monitor clinically.   Disposition Plan   Expected discharge in approx 1-2 days to transitional care unit (prefers  TCU) vs home with PCA based on therapy needs.      Entered: Domenico Luna 11/04/2018, 10:34 AM              Attestation:  I have reviewed today's vital signs, notes, medications, labs and imaging.     Domenico Luna MD

## 2018-11-04 NOTE — PLAN OF CARE
Problem: Patient Care Overview  Goal: Plan of Care/Patient Progress Review  Pt sleepy this morning but more alert in PM. Goal is to get to chair. PT felt that based on performance with standing yesterday and sitting balance with OT today, Lift may be safest method. Pt does agree. BRACE LOCKED at 10 degrees for lift transfer and strap for L LE place very proximal on the L LE. Pt tolerated well with assist of 2, she felt good in chair with spouse.  Spouse questioning why the lift and why rehab. Pt has not been able to stand consistently even with assist of 2 people, rehab necessary to increase strength, standing tolerance and help to get equipment for return to home while pt likely will still be NWB.    Discharge Planner PT   Patient plan for discharge: agrees to rehab  Current status: max-total for bed mobility, max-total for standing trial but tolerates lift, sitting balance CGA while static and min-mod with activity today  Barriers to return to prior living situation: mobility  Recommendations for discharge: rehab  Rationale for recommendations: see above, work toward mobility with assist of 1 or will need to get lift and other equipment for home.       Entered by: Cristina Vargas 11/04/2018 2:30 PM

## 2018-11-04 NOTE — PLAN OF CARE
Problem: Surgery Nonspecified (Adult)  Goal: Signs and Symptoms of Listed Potential Problems Will be Absent, Minimized or Managed (Surgery Nonspecified)  Signs and symptoms of listed potential problems will be absent, minimized or managed by discharge/transition of care (reference Surgery Nonspecified (Adult) CPG).   Outcome: No Change    VS: Temp: 99.6  F (37.6  C) Temp src: Oral BP: 127/62   Heart Rate: 89 Resp: 16    O2: SpO2: 97 % O2 Device: None (Room air)     Output: Urine output adequate via pure wick external catheter - changed at 1000   Last BM: 10/31 - pt had suppository 11/3 and has been having smears since then but no BM    Activity: Sat on edge of bed today, Ax1-2   Skin: Intact - incision    Pain: Managed with 5mg Oxycodone, Robaxin, scheduled Tylenol. Pt has been very sleepy today, narcotic doses reduced   CMS: Intact - no new numbness or tingling, baseline neuropathy    Dressing: CDI   Diet: Regular diet, well tolerated. No N/V reported   LDA: Pure wick catheter   Equipment: Hinged knee brace   Plan: Discharge to TCU, date pending   Additional Info:

## 2018-11-04 NOTE — PROGRESS NOTES
"Orthopedics Daily Progress Note    S: no acute events overnight, afebrile. Muscle spasms primary source of distress. Tolerating clears without n/v. Denies new n/t or chest pain/shortness of breath.     O: /62 (BP Location: Left arm)  Pulse 103  Temp 99.6  F (37.6  C) (Oral)  Resp 16  Ht 1.727 m (5' 8\")  Wt 56.7 kg (125 lb)  LMP  (LMP Unknown)  SpO2 97%  BMI 19.01 kg/m2    No acute distress  Non-labored respiration  LLE: HKB and ace in place, dressings chagned, c,d,i; incisions healing well; sealed with dermabond; wiggles toes and ankle, limited by pain, does not vic the foot (baseline). sensation intact in deep peroneal, superficial peroneal, tibial, sural, saphenous nerve distributions, foot swollen, wwp; foot and ankle with increased swelling, ankle sore     Labs:    Recent Labs  Lab 11/03/18  0501 11/02/18  0521 11/01/18  0556 10/31/18  1859    142 143 144   POTASSIUM 4.0 3.8 3.8 3.7   CHLORIDE 105 105 107 106   CO2 32 32 32 33*   BUN 17 20 20 22   CR 1.01 1.05* 0.83 1.00   * 95 86 98   MAG  --  1.8  --   --        Recent Labs  Lab 11/04/18  0532 11/03/18  0501 11/02/18  0521 11/01/18  1916 11/01/18  0556 10/31/18  1859   WBC  --   --  4.0  --  4.3 7.1   HGB 8.0* 8.0* 9.0* 10.3* 9.6* 9.9*   PLT 83*  --  83*  --  105* 124*       Recent Labs  Lab 10/31/18  1859   INR 1.03       A/P:   Marylee Woods is a 63 year old female with MS who sustained a distal femur fracture on the left during a mechanical fall on 10/31 now s/p ORIF on 11/1.    -Ortho primary   -WB: TTWB LLE  -Activity: with assist and walker   -DVT ppx: lovenox x 4 weeks   -Abx: ancef x 24 hrs   -Post op fever, resolved   -PT/OT  -Brace: hinged knee brace unlocked 0-40 degrees  -Pain: PO as tolerated with IV for breakthrough; having a lot of spasm; increased baclofen and added PRN robaxin   -Diet: ADAT   -Labs: follow hgb  -Acute on chronic anemia: 1 uRBC in the OR on 11/1.  HGB stable today   -Consults: hospitalist, JUSTIN "   -Follow up: 2 weeks with Dr. Valadez for a wound check   -Dispo: awaiting social work TCU placement and medicine clearance; ok for discharge from ortho standpoint     Paco Gannon MD   Orthopedic Surgery; PGY-4  Pager: [phone]    For any questions regarding this patient please page me prior to paging the ortho resident on call.

## 2018-11-04 NOTE — PLAN OF CARE
Problem: Surgery Nonspecified (Adult)  Goal: Signs and Symptoms of Listed Potential Problems Will be Absent, Minimized or Managed (Surgery Nonspecified)  Signs and symptoms of listed potential problems will be absent, minimized or managed by discharge/transition of care (reference Surgery Nonspecified (Adult) CPG).   Outcome: Therapy, progress toward functional goals as expected    VS: VSS   O2: >94% on RA   Output: Baseline incontinence, Pure wick in continuous suction at 40mmHg. Bladder scan volume 199cc   Last BM: 11/3/18, small after suppository    Activity: Able to reposition with 1-assist    Skin: Intact- incsion   Pain: Pt denies pain, given scheduled tylenol     CMS: Denies new numbness and tingling- Baseline neuropathy, MS   Dressing: CDI   Diet: Regular, tolerated well    LDA: Pure wick continuous suction at 40mmHg   Equipment: Hinge knee brace, PCD   Plan: Call light within pt reach, patient able to make need known, will continue to monitor. discharge to TCU based on PT recommendations   Additional Info: Refuse her PCD,  Bilateral  foot +1 edema

## 2018-11-04 NOTE — PROGRESS NOTES
RN removed Pure Wick external catheter while patient is in the wheelchair so she is able to leave her room. Brief is in place, will replace new pure wick when pt returns to bed.

## 2018-11-04 NOTE — PLAN OF CARE
Problem: Patient Care Overview  Goal: Plan of Care/Patient Progress Review  Discharge Planner OT   Patient plan for discharge: Did not discuss  Current status: Patient continues to be groggy throughout session but willing to participate. Supine>sit with mod A. Initiated education on use of AE for LE dressing, patient overall max A with task. Patient engaged in AROM UE exercises seated EOB, needing more assist to maintain upright posture today. Max Ax2 for sit>supine.   Barriers to return to prior living situation: level of assist for mobility and ADLs, weakness, post op precautions  Recommendations for discharge: TCU  Rationale for recommendations: To maximize safety and IND with functional mobility and ADLs/IADLs       Entered by: YENIFER CORONA 11/04/2018 10:16 AM

## 2018-11-04 NOTE — PLAN OF CARE
"Problem: Patient Care Overview  Goal: Plan of Care/Patient Progress Review  Outcome: No Change  VS: VSS. LS clear. Pt denies chest pain and shortness of breath. /64 (BP Location: Left arm)  Pulse 103  Temp 99.1  F (37.3  C) (Oral)  Resp 16  Ht 1.727 m (5' 8\")  Wt 56.7 kg (125 lb)  LMP  (LMP Unknown)  SpO2 95%  BMI 19.01 kg/m2     O2: 95% on RA   Output: Pure wick in continuous suction at 40mmHg (baseline incontinence). Bladder scan for 90 mL   Last BM: Small BM 11/3/18   Activity: Repositions in bed Ax1. Slept throughout night.    Skin: Intact except for incision. +1 bilateral edema in feet.    Pain: Denies pain.    CMS: Denies new numbness and tingling (baseline neuropathy).    Dressing: CDI   Diet: Regular, tolerated well    LDA: Pure wick    Equipment: Hinge knee brace, PCD   Plan: Discharge to TCU based.   Pt able to make needs known, call light is within reach. Continue to monitor.    Additional Info:            "

## 2018-11-05 ENCOUNTER — APPOINTMENT (OUTPATIENT)
Dept: GENERAL RADIOLOGY | Facility: CLINIC | Age: 63
DRG: 481 | End: 2018-11-05
Payer: MEDICARE

## 2018-11-05 ENCOUNTER — APPOINTMENT (OUTPATIENT)
Dept: PHYSICAL THERAPY | Facility: CLINIC | Age: 63
DRG: 481 | End: 2018-11-05
Payer: MEDICARE

## 2018-11-05 LAB — PLATELET # BLD AUTO: 108 10E9/L (ref 150–450)

## 2018-11-05 PROCEDURE — A9270 NON-COVERED ITEM OR SERVICE: HCPCS | Mod: GY | Performed by: STUDENT IN AN ORGANIZED HEALTH CARE EDUCATION/TRAINING PROGRAM

## 2018-11-05 PROCEDURE — 36415 COLL VENOUS BLD VENIPUNCTURE: CPT | Performed by: ORTHOPAEDIC SURGERY

## 2018-11-05 PROCEDURE — 99231 SBSQ HOSP IP/OBS SF/LOW 25: CPT | Performed by: INTERNAL MEDICINE

## 2018-11-05 PROCEDURE — 25000132 ZZH RX MED GY IP 250 OP 250 PS 637: Mod: GY | Performed by: STUDENT IN AN ORGANIZED HEALTH CARE EDUCATION/TRAINING PROGRAM

## 2018-11-05 PROCEDURE — A9270 NON-COVERED ITEM OR SERVICE: HCPCS | Mod: GY | Performed by: NURSE PRACTITIONER

## 2018-11-05 PROCEDURE — 97530 THERAPEUTIC ACTIVITIES: CPT | Mod: GP | Performed by: PHYSICAL THERAPIST

## 2018-11-05 PROCEDURE — 73610 X-RAY EXAM OF ANKLE: CPT | Mod: LT

## 2018-11-05 PROCEDURE — 40000193 ZZH STATISTIC PT WARD VISIT: Performed by: PHYSICAL THERAPIST

## 2018-11-05 PROCEDURE — 97110 THERAPEUTIC EXERCISES: CPT | Mod: GP | Performed by: PHYSICAL THERAPIST

## 2018-11-05 PROCEDURE — 85049 AUTOMATED PLATELET COUNT: CPT | Performed by: ORTHOPAEDIC SURGERY

## 2018-11-05 PROCEDURE — 12000001 ZZH R&B MED SURG/OB UMMC

## 2018-11-05 PROCEDURE — 25000132 ZZH RX MED GY IP 250 OP 250 PS 637: Mod: GY | Performed by: INTERNAL MEDICINE

## 2018-11-05 PROCEDURE — A9270 NON-COVERED ITEM OR SERVICE: HCPCS | Mod: GY | Performed by: INTERNAL MEDICINE

## 2018-11-05 PROCEDURE — 40000449 ZZHC STATISTIC PT VISIT, LYMPHEDEMA: Performed by: PHYSICAL THERAPIST

## 2018-11-05 PROCEDURE — 25000128 H RX IP 250 OP 636: Performed by: STUDENT IN AN ORGANIZED HEALTH CARE EDUCATION/TRAINING PROGRAM

## 2018-11-05 PROCEDURE — 25000132 ZZH RX MED GY IP 250 OP 250 PS 637: Mod: GY | Performed by: NURSE PRACTITIONER

## 2018-11-05 RX ORDER — METHOCARBAMOL 500 MG/1
250 TABLET, FILM COATED ORAL 4 TIMES DAILY PRN
Qty: 240 TABLET | DISCHARGE
Start: 2018-11-05 | End: 2018-11-06

## 2018-11-05 RX ORDER — BACLOFEN 5 MG/1
35 TABLET ORAL EVERY EVENING
DISCHARGE
Start: 2018-11-05 | End: 2019-04-27

## 2018-11-05 RX ORDER — OXYCODONE HYDROCHLORIDE 5 MG/1
2.5-5 TABLET ORAL
Qty: 60 TABLET | Refills: 0 | Status: ON HOLD | OUTPATIENT
Start: 2018-11-05 | End: 2018-11-20

## 2018-11-05 RX ORDER — BACLOFEN 5 MG/1
25 TABLET ORAL EVERY MORNING
DISCHARGE
Start: 2018-11-05 | End: 2019-04-27

## 2018-11-05 RX ORDER — HYDROXYZINE HYDROCHLORIDE 25 MG/1
25 TABLET, FILM COATED ORAL EVERY 6 HOURS PRN
Qty: 120 TABLET | Status: ON HOLD | DISCHARGE
Start: 2018-11-05 | End: 2018-11-20

## 2018-11-05 RX ADMIN — ENOXAPARIN SODIUM 40 MG: 40 INJECTION SUBCUTANEOUS at 01:06

## 2018-11-05 RX ADMIN — OMEPRAZOLE 40 MG: 20 CAPSULE, DELAYED RELEASE ORAL at 07:42

## 2018-11-05 RX ADMIN — ACETAMINOPHEN 975 MG: 325 TABLET, FILM COATED ORAL at 08:20

## 2018-11-05 RX ADMIN — VALACYCLOVIR HYDROCHLORIDE 500 MG: 500 TABLET, FILM COATED ORAL at 07:42

## 2018-11-05 RX ADMIN — Medication 25 MG: at 07:41

## 2018-11-05 RX ADMIN — SERTRALINE HYDROCHLORIDE 150 MG: 100 TABLET ORAL at 20:50

## 2018-11-05 RX ADMIN — Medication 2.5 MG: at 08:20

## 2018-11-05 RX ADMIN — NITROFURANTOIN (MONOHYDRATE/MACROCRYSTALS) 100 MG: 75; 25 CAPSULE ORAL at 07:42

## 2018-11-05 RX ADMIN — MULTIPLE VITAMINS W/ MINERALS TAB 1 TABLET: TAB at 07:42

## 2018-11-05 RX ADMIN — ACETAMINOPHEN 975 MG: 325 TABLET, FILM COATED ORAL at 16:07

## 2018-11-05 RX ADMIN — GABAPENTIN 900 MG: 300 CAPSULE ORAL at 21:01

## 2018-11-05 RX ADMIN — GABAPENTIN 600 MG: 300 CAPSULE ORAL at 07:42

## 2018-11-05 RX ADMIN — ACETAMINOPHEN 975 MG: 325 TABLET, FILM COATED ORAL at 00:37

## 2018-11-05 RX ADMIN — CHOLECALCIFEROL CAP 125 MCG (5000 UNIT) 5000 UNITS: 125 CAP at 07:42

## 2018-11-05 RX ADMIN — Medication 25 MG: at 12:14

## 2018-11-05 RX ADMIN — OXYBUTYNIN CHLORIDE 10 MG: 5 TABLET, EXTENDED RELEASE ORAL at 07:42

## 2018-11-05 RX ADMIN — Medication 35 MG: at 20:51

## 2018-11-05 RX ADMIN — VALACYCLOVIR HYDROCHLORIDE 500 MG: 500 TABLET, FILM COATED ORAL at 20:50

## 2018-11-05 RX ADMIN — Medication 2.5 MG: at 20:57

## 2018-11-05 RX ADMIN — POLYETHYLENE GLYCOL 3350 17 G: 17 POWDER, FOR SOLUTION ORAL at 07:41

## 2018-11-05 RX ADMIN — OXYBUTYNIN CHLORIDE 10 MG: 5 TABLET, EXTENDED RELEASE ORAL at 16:07

## 2018-11-05 RX ADMIN — Medication 250 MG: at 07:42

## 2018-11-05 ASSESSMENT — ACTIVITIES OF DAILY LIVING (ADL)
ADLS_ACUITY_SCORE: 21

## 2018-11-05 NOTE — DISCHARGE SUMMARY
ORTHOPEDIC SURGERY DISCHARGE SUMMARY    Marylee Woods [id number] 10/31/2018  5:00 PM  63 year old female  11/5/2018    Date of Admission: 10/31/2018  Date of Discharge: 11/6/2018  Disposition: TCU  Primary care physician: Carolina Pulliam  Orthopaedic physician provider(s): Dr. Jose Valadez MD, Orthopedic Surgery    ADMISSION DIAGNOSIS:  L distal femur fracture       HPI:   62 yo F with MS who sustained a mechanical fall earlier today.  She fell out of her wheelchair when she got caught.  She landed on her left knee and had immediate pain in that area.  She denies any other injury or any head trauma or loss of consciousness.  She is now unable to ambulate since the injury.  She denies any new numbness or tingling.  She does have some edema and wears lymphedema compression socks.        DISCHARGE DIAGNOSIS:  same    SURGERY:  Open reduction internal fixation left distal femur 11/1/2018    HOSPITAL COURSE:  Surgery was uncomplicated. The patient has done well post-operatively. Medicine  consult was requested for co-management. She has a history of multiple sclerosis which has impacted her ability to transition to home from hospital. She has received routine nursing cares and is medically stable. Vital signs are stable. she is tolerating a regular diet without GI distress/nausea or vomiting; and voiding spontaneously. she has met PT/OT goals for safe mobility. Pain is controlled on oral medications which will be available at discharge. She will be given stool softeners to use while taking pain medications to help prevent constipation. she has a safe discharge plan to transitional care.    DVT prophylaxis:  *40 mg lovenox daily for 4 weeks  Antibiotics:   given periop and 24 hours postop.   Activity:   Toe Touch weight bearing LLE  Follow up:   Wound check in the orthopedic clinic in 2 weeks, then 6 week  follow up with Dr. Valadez.     Other discharge orders and instructions as below.      LABS:    Recent  Labs  Lab 11/05/18  0633 11/04/18  0532 11/03/18  0501 11/02/18  0521 11/01/18  1916 11/01/18  0556 10/31/18  1859   WBC  --   --   --  4.0  --  4.3 7.1   HGB  --  8.0* 8.0* 9.0* 10.3* 9.6* 9.9*   * 83*  --  83*  --  105* 124*       Recent Labs  Lab 11/03/18  0501 11/02/18  0521 11/01/18  0556 10/31/18  1859    142 143 144   POTASSIUM 4.0 3.8 3.8 3.7   CHLORIDE 105 105 107 106   CO2 32 32 32 33*   BUN 17 20 20 22   CR 1.01 1.05* 0.83 1.00   * 95 86 98   MAG  --  1.8  --   --        Recent Labs  Lab 10/31/18  1859   INR 1.03       ALLERGIES:     Allergies   Allergen Reactions     Amantadine      hallucinations     Budesonide        PENDING TESTS RESULTS:  None    SUB-SPECIALTY CONSULTANT RECOMMENDATIONS:  Pt is medically stable for discharge to TCU  Discontinue Methocarbamol  Continue to hold hydrochlorothiazide   Lovenox for DVT proph    PLANNED DISCHARGE ORDERS, RECOMMENDATIONS, AND FOLLOWUP:    Discharge Procedure Orders  General info for SNF   Order Comments: Length of Stay Estimate: Short Term Care: Estimated # of Days <30  Condition at Discharge: Improving  Level of care:skilled   Rehabilitation Potential: Fair  Admission H&P remains valid and up-to-date: Yes  Recent Chemotherapy: N/A  Use Nursing Home Standing Orders: Yes     Mantoux instructions   Order Comments: Give two-step Mantoux (PPD) Per Facility Policy Yes     Reason for your hospital stay   Order Comments: Left distal femur fracture     Wound care   Order Comments: Site:   Left lateral knee and thigh. Left medial knee  Instructions:  Change dressings every other day; no dressings needed after 11/14 if dry     Additional Discharge Instructions   Order Comments: -Pain control: Take medication as prescribed, but only as often as necessary. Do not drive or drink alcohol while you are taking pain medication. Remember that Tylenol and anti-inflammatories can be used for pain relief as you wean off the narcotics. Do not exceed 4,000 mg  of tylenol in 24 hours.    -Activity: Toe touch weightbearing on left lower extremity. Hinged knee brace from 0-40 degrees of ROM. Recommend locking brace for standing and transfers until quad supports transfers and WB.     -DVT prophylaxis: Lovenox for 4 weeks    -Call our clinic at [phone] during regular office hours, or [phone] for the ortho resident on call after hours for questions - ON-CALL RESIDENTS DO NOT HAVE ABILITY TO PRESCRIBE NARCOTICS AFTER HOURS OR ON WEEKENDS. If narcotic pain medication prescriptions are required during these times, you will have to go to an Urgent Care or Emergency Department, or wait until clinic opens.    -Reasons to call clinic: Pain in your surgical area persists or worsens in the first few days after discharge; excessive redness or drainage of cloudy or bloody material from the wounds (some light drainage is often normal in the first week after surgery); drainage of any kind greater than one week after surgery; temperature elevation greater than 101.0 ; You have pain, swelling or redness in your calf that does not improve with elevation; new numbness or weakness in your leg or foot which does not improve with repositioning.  Call your primary doctor or seek medical care if you have chest pain or shortness of breath.     Activity - Up with nursing assistance   Order Specific Question Answer Comments   Is discharge order? Yes      Weight bearing status   Order Comments: Toe touch WB of the LLE     Full Code   Order Specific Question Answer Comments   Code status determined by: Unable to discuss and no AD/POLST on file; continue PREVIOUSLY ORDERED code status      Physical Therapy Adult Consult   Order Comments: Evaluate and treat as clinically indicated.    Reason:  Safe transfers and mobility. Strengthening as able     Occupational Therapy Adult Consult   Order Comments: Evaluate and treat as clinically indicated.    Reason:  ADL's and transfers s/p femur fracture      Advance Diet as Tolerated   Order Comments: Follow this diet upon discharge: regular   Order Specific Question Answer Comments   Is discharge order? Yes           Review of your medicines      START taking       Dose / Directions    enoxaparin 40 MG/0.4ML injection   Commonly known as:  LOVENOX        Dose:  40 mg   Inject 0.4 mLs (40 mg) Subcutaneous every 24 hours for 23 days   Refills:  0       hydrOXYzine 25 MG tablet   Commonly known as:  ATARAX        Dose:  25 mg   Take 1 tablet (25 mg) by mouth every 6 hours as needed for other (adjuvant pain)   Quantity:  120 tablet   Refills:  0       oxyCODONE IR 5 MG tablet   Commonly known as:  ROXICODONE   Used for:  Closed fracture of distal end of left femur, sequela        Dose:  2.5-5 mg   Take 0.5-1 tablets (2.5-5 mg) by mouth every 3 hours as needed for moderate to severe pain (give half tablet for pain 4-6/10 and 1 tablet 7-10/10)   Quantity:  60 tablet   Refills:  0         CONTINUE these medicines which may have CHANGED, or have new prescriptions. If we are uncertain of the size of tablets/capsules you have at home, strength may be listed as something that might have changed.       Dose / Directions    * Baclofen 5 MG Tabs   This may have changed:    - medication strength  - how much to take  - when to take this  - additional instructions        Dose:  25 mg   Take 25 mg by mouth every morning   Refills:  0       * Baclofen 5 MG Tabs   This may have changed:  You were already taking a medication with the same name, and this prescription was added. Make sure you understand how and when to take each.        Dose:  35 mg   Take 35 mg by mouth every evening   Refills:  0       * Baclofen 5 MG Tabs   This may have changed:  You were already taking a medication with the same name, and this prescription was added. Make sure you understand how and when to take each.   Used for:  MS (multiple sclerosis) (H)        Dose:  25 mg   Take 25 mg by mouth 2 times daily as  needed for muscle spasms   Quantity:  90 tablet   Refills:  0       * gabapentin 300 MG capsule   Commonly known as:  NEURONTIN   This may have changed:  You were already taking a medication with the same name, and this prescription was added. Make sure you understand how and when to take each.   Used for:  MS (multiple sclerosis) (H)        Dose:  900 mg   Take 3 capsules (900 mg) by mouth At Bedtime   Quantity:  90 capsule   Refills:  0       * gabapentin 300 MG capsule   Commonly known as:  NEURONTIN   This may have changed:    - how much to take  - when to take this  - additional instructions   Used for:  MS (multiple sclerosis) (H)        Dose:  600 mg   Start taking on:  11/7/2018   Take 2 capsules (600 mg) by mouth daily   Quantity:  90 capsule   Refills:  0       * Notice:  This list has 5 medication(s) that are the same as other medications prescribed for you. Read the directions carefully, and ask your doctor or other care provider to review them with you.      CONTINUE these medicines which have NOT CHANGED       Dose / Directions    acetaminophen 325 MG tablet   Commonly known as:  TYLENOL   Used for:  Multiple sclerosis exacerbation (H)        Dose:  650 mg   Take 2 tablets (650 mg) by mouth every 4 hours as needed for mild pain or fever (greater than 101 degrees)   Quantity:  100 tablet   Refills:  0       cholecalciferol 5000 units Caps capsule   Commonly known as:  vitamin D3        Dose:  5000 Units   Take 5,000 Units by mouth daily   Refills:  0       multivitamin, therapeutic with minerals Tabs tablet   Used for:  Anemia        Dose:  1 tablet   Take 1 tablet by mouth daily.   Refills:  0       NICOTROL 10 MG Inhaler   Generic drug:  nicotine        Dose:  1 Cartridge   Inhale 1 Cartridge into the lungs daily as needed for smoking cessation   Refills:  1       nitroFURantoin (macrocrystal-monohydrate) 100 MG capsule   Commonly known as:  MACROBID        Dose:  100 mg   Take 100 mg by mouth daily    Refills:  0       omeprazole 40 MG capsule   Commonly known as:  priLOSEC   Used for:  Gastroesophageal reflux disease, esophagitis presence not specified        TAKE 1 CAPSULE BY MOUTH EVERY DAY   Quantity:  90 capsule   Refills:  2       order for DME   Used for:  MS (multiple sclerosis) (H)        Equipment being ordered: medical lift chair   Quantity:  1 Units   Refills:  0       oxybutynin 10 MG 24 hr tablet   Commonly known as:  DITROPAN-XL   Used for:  Neurogenic bladder        Dose:  10 mg   Take 1 tablet (10 mg) by mouth 2 times daily   Quantity:  30 tablet   Refills:  0       polyethylene glycol Packet   Commonly known as:  MIRALAX/GLYCOLAX   Used for:  Multiple sclerosis exacerbation (H)        Dose:  17 g   Take 17 g by mouth daily   Quantity:  30 packet   Refills:  1       sertraline 100 MG tablet   Commonly known as:  ZOLOFT   Used for:  Major depression in complete remission (H)        TAKE 1.5 TABLETS BY MOUTH DAILY   Quantity:  135 tablet   Refills:  1       valACYclovir 500 MG tablet   Commonly known as:  VALTREX   Used for:  Herpes simplex disease        Dose:  500 mg   Take 1 tablet (500 mg) by mouth 2 times daily   Quantity:  12 tablet   Refills:  5         STOP taking          hydrochlorothiazide 12.5 MG Tabs tablet                Where to get your medicines      Some of these will need a paper prescription and others can be bought over the counter. Ask your nurse if you have questions.     Bring a paper prescription for each of these medications      oxyCODONE IR 5 MG tablet       You don't need a prescription for these medications      Baclofen 5 MG Tabs     Baclofen 5 MG Tabs     Baclofen 5 MG Tabs     enoxaparin 40 MG/0.4ML injection     gabapentin 300 MG capsule     gabapentin 300 MG capsule     hydrOXYzine 25 MG tablet               Paco Gannon MD  Orthopedic Surgery, PGY-4  Pager: [phone]   Discharge summary updated by me to reflect change in plan/medications.

## 2018-11-05 NOTE — PLAN OF CARE
Problem: Patient Care Overview  Goal: Plan of Care/Patient Progress Review  Pt seen by PT 2x this AM. She was able to complete mod A slideboard transfers bed to/from WC both times to the R stronger side with use of bed rails. Pt with better core strength but still needing occasional balance catch by PT along with help for lift. Pt asked about using a standing frame in rehab and educated on risk of WB for the L foot when up but also not pressure through the knee to brace the L LE, nor can she flex to 90 degrees.  Pt will need rehab and is motivated.    Discharge Planner PT   Patient plan for discharge: TCU  Current status: max A bed mobility but improving, min A sitting balance for dynamic, mod A slideboard transfers, lift with nursing staff  Barriers to return to prior living situation: mobility with NWB and weakness  Recommendations for discharge: TCU  Rationale for recommendations: mobility training, strengthening       Entered by: Cristina Vargas 11/05/2018 11:04 AM

## 2018-11-05 NOTE — PROGRESS NOTES
Social Work Services Progress Note    Hospital Day: 6    Collaborated with:  Jennifer PARKS in [facility] intake    Data:  Discharge Planning    Intervention:  Writer informed pt could be ready for Discharge. Per Jennifer in [facility] Intake, pt has been assessed by [facility]U and does not meet admission criteria. [facility] TCU is full    Assessment:  pt remains agreeable w/DC plan    Plan:    Anticipated Disposition:  [facility] TCU    Barriers to d/c plan:  Bed availability    Follow Up:  JUSTIN con't to follow

## 2018-11-05 NOTE — PROGRESS NOTES
Pt seen, case reviewed with Dr Luna    Pain has been under fair control  No BM since 10/31  No nausea or emesis  No dizziness, chest pain, SOB  Good UO    VSS  Alert, pleasant, in NAD  Lungs clear  CV rrr  Abd soft, non-distended, non-tender  L LE in brace, soft, + pain with palp over calf      Plt 108 K    Assessment    1.  Left distal femur fracture 10/31.  S/P ORIF.  Adequate pain control.   2.  Advanced multiple sclerosis with predominant left-sided weakness.   3.  Essential hypertension, adequate off AM hydrochlorothiazide.   4.  History of recurrent urinary tract infection.  On  Macrobid for suppression.  Urine culture 09/04/2018 did demonstrate greater than 100,000 colonies of Escherichia coli.  F/U UC neg 11/1.   5. History of pancytopenia attributed to prior treatment for multiple sclerosis. Mild thrombocytopenia, stable  6. Acute blood loss superimposed on normochromic normocytic anemia   7.  Constipation, chronic 2nd to MS, aggravated by opiates.      Plan  Continue therapies  Bowel regimen (offer Fleet enema today)  Lovenox for DVT proph  discharge soon, hopefully to  TCU

## 2018-11-05 NOTE — PLAN OF CARE
Problem: Surgery Nonspecified (Adult)  Goal: Signs and Symptoms of Listed Potential Problems Will be Absent, Minimized or Managed (Surgery Nonspecified)  Signs and symptoms of listed potential problems will be absent, minimized or managed by discharge/transition of care (reference Surgery Nonspecified (Adult) CPG).   Outcome: Improving    VS: Temp: 99.9  F (37.7  C) Temp src: Oral BP: 125/73   Heart Rate: 92 Resp: 16    O2: SpO2: 97 % O2 Device: None (Room air)     Output: Urine output adequate via pure wick   Last BM: 11/5   Activity: Up with Ax2   Skin: Intact - incision    Pain: Managed with Oxycodone, scheduled Tylenol, ice packs   CMS: Baseline neuropathy, nothing new noted   Dressing: CDI   Diet: Regular diet, well tolerated. No N/V   LDA: Pure wick external catheter   Equipment: PCDs, hinged knee brace   Plan: Discharge to TCU when bed available   Additional Info:

## 2018-11-05 NOTE — PLAN OF CARE
Problem: Surgery Nonspecified (Adult)  Goal: Signs and Symptoms of Listed Potential Problems Will be Absent, Minimized or Managed (Surgery Nonspecified)  Signs and symptoms of listed potential problems will be absent, minimized or managed by discharge/transition of care (reference Surgery Nonspecified (Adult) CPG).   Outcome: Therapy, progress toward functional goals as expected    VS: VSS   O2: >94 on RA   Output: 0 output, with pure wick suction, dry all shift. bladder scan 150 ml. Baseline incontinence with absorbant pad   Last BM: BM 10/31 per patient report, suppository given 11/3, smear BM 11/4, Miralax given 11/4 no result.   Activity: Assist of X2   Skin: Intact- incision   Pain: Managed with scheduled Tylenol and 5 mg of oxycodone. Alert and oriented X4   CMS: Baseline neuropathy , MS   Dressing: CDI - intact   Diet: Regular , patient refused to order supper   LDA: Pure wick external catheter    Equipment: Incentive spirometer, SCD, hinge brace   Plan: Discharge Placement in TCU, date TBD   Additional Info:

## 2018-11-05 NOTE — PLAN OF CARE
Problem: Patient Care Overview  Goal: Individualization & Mutuality  Pt A&O x's 4. VSS.low grade fever, Is and po fluid encouraged. Scheduled tylenol administered. 02 sats in the 90s on RA.Lungs clear. Denies SOB, CP and nausea. Tolerating regular diet. Bowel sound active in all quadrants. No BM  During the shift but passing gas. Pure wick catheter out 200 ml,also pas was very saturated. Pain well managed, pt declined prn oxycodone stated will take in am prior to PT. CMS intact, denies new onset of  N/T. Left leg dressing clean, dry and intact pt declined braced, ace wraps removed.   Pt slept between care and is able to make needs known, call light with in reach. Will continue to monitor.     Okay to administer Lovenox per ortho, platelet @83

## 2018-11-06 ENCOUNTER — HOSPITAL ENCOUNTER (INPATIENT)
Facility: SKILLED NURSING FACILITY | Age: 63
LOS: 15 days | Discharge: HOME HEALTH | DRG: 560 | End: 2018-11-21
Attending: INTERNAL MEDICINE | Admitting: INTERNAL MEDICINE
Payer: MEDICARE

## 2018-11-06 ENCOUNTER — APPOINTMENT (OUTPATIENT)
Dept: OCCUPATIONAL THERAPY | Facility: CLINIC | Age: 63
DRG: 481 | End: 2018-11-06
Payer: MEDICARE

## 2018-11-06 VITALS
BODY MASS INDEX: 18.94 KG/M2 | HEIGHT: 68 IN | SYSTOLIC BLOOD PRESSURE: 118 MMHG | HEART RATE: 103 BPM | RESPIRATION RATE: 16 BRPM | OXYGEN SATURATION: 99 % | WEIGHT: 125 LBS | DIASTOLIC BLOOD PRESSURE: 64 MMHG | TEMPERATURE: 98.5 F

## 2018-11-06 DIAGNOSIS — Z87.81 HISTORY OF FRACTURE OF FIBULA: Primary | ICD-10-CM

## 2018-11-06 DIAGNOSIS — Z87.81 HISTORY OF TIBIAL FRACTURE: ICD-10-CM

## 2018-11-06 DIAGNOSIS — S72.402A CLOSED FRACTURE OF DISTAL END OF LEFT FEMUR, UNSPECIFIED FRACTURE MORPHOLOGY, INITIAL ENCOUNTER (H): ICD-10-CM

## 2018-11-06 DIAGNOSIS — G35 MS (MULTIPLE SCLEROSIS) (H): ICD-10-CM

## 2018-11-06 LAB — RADIOLOGIST FLAGS: ABNORMAL

## 2018-11-06 PROCEDURE — A9270 NON-COVERED ITEM OR SERVICE: HCPCS | Mod: GY | Performed by: INTERNAL MEDICINE

## 2018-11-06 PROCEDURE — 25000132 ZZH RX MED GY IP 250 OP 250 PS 637: Mod: GY | Performed by: INTERNAL MEDICINE

## 2018-11-06 PROCEDURE — 97530 THERAPEUTIC ACTIVITIES: CPT | Mod: GO | Performed by: OCCUPATIONAL THERAPIST

## 2018-11-06 PROCEDURE — 25000132 ZZH RX MED GY IP 250 OP 250 PS 637: Mod: GY | Performed by: STUDENT IN AN ORGANIZED HEALTH CARE EDUCATION/TRAINING PROGRAM

## 2018-11-06 PROCEDURE — A9270 NON-COVERED ITEM OR SERVICE: HCPCS | Mod: GY | Performed by: STUDENT IN AN ORGANIZED HEALTH CARE EDUCATION/TRAINING PROGRAM

## 2018-11-06 PROCEDURE — 12000022 ZZH R&B SNF

## 2018-11-06 PROCEDURE — 40000133 ZZH STATISTIC OT WARD VISIT: Performed by: OCCUPATIONAL THERAPIST

## 2018-11-06 PROCEDURE — 25000128 H RX IP 250 OP 636: Performed by: INTERNAL MEDICINE

## 2018-11-06 PROCEDURE — 25000128 H RX IP 250 OP 636: Performed by: STUDENT IN AN ORGANIZED HEALTH CARE EDUCATION/TRAINING PROGRAM

## 2018-11-06 PROCEDURE — 97535 SELF CARE MNGMENT TRAINING: CPT | Mod: GO | Performed by: OCCUPATIONAL THERAPIST

## 2018-11-06 PROCEDURE — 99231 SBSQ HOSP IP/OBS SF/LOW 25: CPT | Performed by: INTERNAL MEDICINE

## 2018-11-06 RX ORDER — GABAPENTIN 300 MG/1
900 CAPSULE ORAL AT BEDTIME
Qty: 90 CAPSULE | DISCHARGE
Start: 2018-11-06 | End: 2019-05-04

## 2018-11-06 RX ORDER — GABAPENTIN 300 MG/1
600 CAPSULE ORAL DAILY
Status: DISCONTINUED | OUTPATIENT
Start: 2018-11-07 | End: 2018-11-21 | Stop reason: HOSPADM

## 2018-11-06 RX ORDER — NALOXONE HYDROCHLORIDE 0.4 MG/ML
.1-.4 INJECTION, SOLUTION INTRAMUSCULAR; INTRAVENOUS; SUBCUTANEOUS
Status: DISCONTINUED | OUTPATIENT
Start: 2018-11-06 | End: 2018-11-21 | Stop reason: HOSPADM

## 2018-11-06 RX ORDER — VALACYCLOVIR HYDROCHLORIDE 500 MG/1
500 TABLET, FILM COATED ORAL 2 TIMES DAILY
Status: DISCONTINUED | OUTPATIENT
Start: 2018-11-06 | End: 2018-11-21 | Stop reason: HOSPADM

## 2018-11-06 RX ORDER — GABAPENTIN 300 MG/1
900 CAPSULE ORAL AT BEDTIME
Status: DISCONTINUED | OUTPATIENT
Start: 2018-11-06 | End: 2018-11-21 | Stop reason: HOSPADM

## 2018-11-06 RX ORDER — MULTIPLE VITAMINS W/ MINERALS TAB 9MG-400MCG
1 TAB ORAL DAILY
Status: DISCONTINUED | OUTPATIENT
Start: 2018-11-07 | End: 2018-11-21 | Stop reason: HOSPADM

## 2018-11-06 RX ORDER — HYDROXYZINE HYDROCHLORIDE 25 MG/1
25 TABLET, FILM COATED ORAL EVERY 6 HOURS PRN
Status: DISCONTINUED | OUTPATIENT
Start: 2018-11-06 | End: 2018-11-07

## 2018-11-06 RX ORDER — OXYBUTYNIN CHLORIDE 10 MG/1
10 TABLET, EXTENDED RELEASE ORAL
Status: DISCONTINUED | OUTPATIENT
Start: 2018-11-06 | End: 2018-11-21 | Stop reason: HOSPADM

## 2018-11-06 RX ORDER — ONDANSETRON 4 MG/1
4 TABLET, ORALLY DISINTEGRATING ORAL EVERY 6 HOURS PRN
Status: DISCONTINUED | OUTPATIENT
Start: 2018-11-06 | End: 2018-11-21 | Stop reason: HOSPADM

## 2018-11-06 RX ORDER — BACLOFEN 5 MG/1
25 TABLET ORAL 2 TIMES DAILY PRN
Status: DISCONTINUED | OUTPATIENT
Start: 2018-11-06 | End: 2018-11-06 | Stop reason: CLARIF

## 2018-11-06 RX ORDER — ONDANSETRON 2 MG/ML
4 INJECTION INTRAMUSCULAR; INTRAVENOUS EVERY 6 HOURS PRN
Status: DISCONTINUED | OUTPATIENT
Start: 2018-11-06 | End: 2018-11-21 | Stop reason: HOSPADM

## 2018-11-06 RX ORDER — BACLOFEN 5 MG/1
25 TABLET ORAL 2 TIMES DAILY PRN
Qty: 90 TABLET | DISCHARGE
Start: 2018-11-06 | End: 2019-04-27

## 2018-11-06 RX ORDER — BACLOFEN 10 MG/1
25 TABLET ORAL EVERY MORNING
Status: DISCONTINUED | OUTPATIENT
Start: 2018-11-07 | End: 2018-11-06

## 2018-11-06 RX ORDER — ACETAMINOPHEN 325 MG/1
650 TABLET ORAL EVERY 4 HOURS PRN
Status: DISCONTINUED | OUTPATIENT
Start: 2018-11-06 | End: 2018-11-21 | Stop reason: HOSPADM

## 2018-11-06 RX ORDER — NITROFURANTOIN 25; 75 MG/1; MG/1
100 CAPSULE ORAL DAILY
Status: DISCONTINUED | OUTPATIENT
Start: 2018-11-07 | End: 2018-11-14

## 2018-11-06 RX ORDER — BACLOFEN 5 MG/1
35 TABLET ORAL EVERY EVENING
Status: DISCONTINUED | OUTPATIENT
Start: 2018-11-06 | End: 2018-11-06

## 2018-11-06 RX ORDER — POLYETHYLENE GLYCOL 3350 17 G/17G
17 POWDER, FOR SOLUTION ORAL DAILY
Status: DISCONTINUED | OUTPATIENT
Start: 2018-11-07 | End: 2018-11-21 | Stop reason: HOSPADM

## 2018-11-06 RX ORDER — GABAPENTIN 300 MG/1
600 CAPSULE ORAL DAILY
Qty: 90 CAPSULE | DISCHARGE
Start: 2018-11-07 | End: 2019-04-27

## 2018-11-06 RX ORDER — CALCIUM CARBONATE 500 MG/1
1000 TABLET, CHEWABLE ORAL 4 TIMES DAILY PRN
Status: DISCONTINUED | OUTPATIENT
Start: 2018-11-06 | End: 2018-11-21 | Stop reason: HOSPADM

## 2018-11-06 RX ADMIN — OXYBUTYNIN CHLORIDE 10 MG: 5 TABLET, EXTENDED RELEASE ORAL at 09:14

## 2018-11-06 RX ADMIN — VALACYCLOVIR HYDROCHLORIDE 500 MG: 500 TABLET, FILM COATED ORAL at 19:45

## 2018-11-06 RX ADMIN — VALACYCLOVIR HYDROCHLORIDE 500 MG: 500 TABLET, FILM COATED ORAL at 09:12

## 2018-11-06 RX ADMIN — ACETAMINOPHEN 975 MG: 325 TABLET, FILM COATED ORAL at 00:09

## 2018-11-06 RX ADMIN — GABAPENTIN 600 MG: 300 CAPSULE ORAL at 09:13

## 2018-11-06 RX ADMIN — OXYCODONE HYDROCHLORIDE 2.5 MG: 5 TABLET ORAL at 19:42

## 2018-11-06 RX ADMIN — Medication 2.5 MG: at 09:26

## 2018-11-06 RX ADMIN — ACETAMINOPHEN 975 MG: 325 TABLET, FILM COATED ORAL at 09:12

## 2018-11-06 RX ADMIN — OXYBUTYNIN CHLORIDE 10 MG: 10 TABLET, FILM COATED, EXTENDED RELEASE ORAL at 17:42

## 2018-11-06 RX ADMIN — MULTIPLE VITAMINS W/ MINERALS TAB 1 TABLET: TAB at 09:12

## 2018-11-06 RX ADMIN — SERTRALINE HYDROCHLORIDE 150 MG: 50 TABLET ORAL at 19:45

## 2018-11-06 RX ADMIN — Medication 25 MG: at 09:12

## 2018-11-06 RX ADMIN — GABAPENTIN 900 MG: 300 CAPSULE ORAL at 19:45

## 2018-11-06 RX ADMIN — NITROFURANTOIN (MONOHYDRATE/MACROCRYSTALS) 100 MG: 75; 25 CAPSULE ORAL at 09:13

## 2018-11-06 RX ADMIN — Medication 35 MG: at 19:46

## 2018-11-06 RX ADMIN — OMEPRAZOLE 40 MG: 20 CAPSULE, DELAYED RELEASE ORAL at 09:12

## 2018-11-06 RX ADMIN — ENOXAPARIN SODIUM 40 MG: 40 INJECTION SUBCUTANEOUS at 00:09

## 2018-11-06 RX ADMIN — ENOXAPARIN SODIUM 40 MG: 40 INJECTION SUBCUTANEOUS at 19:46

## 2018-11-06 RX ADMIN — CHOLECALCIFEROL CAP 125 MCG (5000 UNIT) 5000 UNITS: 125 CAP at 09:14

## 2018-11-06 ASSESSMENT — ACTIVITIES OF DAILY LIVING (ADL)
ADLS_ACUITY_SCORE: 21
ADLS_ACUITY_SCORE: 25

## 2018-11-06 NOTE — PLAN OF CARE
Problem: Patient Care Overview  Goal: Plan of Care/Patient Progress Review      Physical Therapy Discharge Summary    Reason for therapy discharge:    Discharged to transitional care facility.    Progress towards therapy goal(s). See goals on Care Plan in [facility] electronic health record for goal details.  Goals partially met.  Barriers to achieving goals:   limited tolerance for therapy.    Therapy recommendation(s)to progress safety and independence with functional mobility prior to returning home  Continued therapy is recommended.  Rationale/Recommendations:  to progress safety and independence with functional mobility prior to returning home.

## 2018-11-06 NOTE — PROGRESS NOTES
RN: Pt arrived 1500, Pm nurse to complete admission assessment, report received by charge nurse.   Pt alert and oriented x 4 and makes needs known.  Denies pain at this time. States has been here one year ago and remembers staff here.  Instructed up with staff  Assist only, oriented to call light and room.

## 2018-11-06 NOTE — IP AVS SNAPSHOT
MRN:[id number]                      After Visit Summary   11/6/2018    Marylee Woods    MRN: [id number]           Thank you!     Thank you for choosing [facility] for your care. Our goal is always to provide you with excellent care. Hearing back from our patients is one way we can continue to improve our services. Please take a few minutes to complete the written survey that you may receive in the mail after you visit with us. Thank you!        Patient Information     Date Of Birth          1955        Designated Caregiver       Most Recent Value    Caregiver    Will someone help with your care after discharge? yes    Name of designated caregiver Manuel    Phone number of caregiver [phone]    Caregiver address [address] [place] [address]. [phone]      About your hospital stay     You were admitted on:  November 6, 2018 You last received care in the:   Transitional Care    You were discharged on:  November 21, 2018        Reason for your hospital stay       You were admitted to the TCU for rehab                  Who to Call     For medical emergencies, please call 911.  For non-urgent questions about your medical care, please call your primary care provider or clinic, [phone]          Attending Provider     Provider Specialty    North Wisdom MD Internal Medicine       Primary Care Provider Office Phone # Fax #    Carolina Pulliam [phone]      After Care Instructions     Activity       Toe touch bearing left lower extremity. Hinged brace 0-60 degrees of ROM. Lock brace for standing and for transfers until qudriceps suppot transfers and WB            Diet       Regular adult diet                  Follow-up Appointments     Adult [facility] Follow-up and recommended labs and tests       Orthopedic clinic in 2 weeks  PCP in one week for post TCU discharge follow up     Appointments on [facility] and/or [facility] (with [facility] or [facility] provider or service). Call  [phone] if you haven't heard regarding these appointments within 7 days of discharge.                  Your next 10 appointments already scheduled     Dec 03, 2018  8:00 AM CST   Level 7 with SH INFUSION CHAIR 6   [facility] ([facility])    Formerly [facility] [place]  [address]  [place] [address]   [phone]            Dec 04, 2018  8:00 AM CST   Level 7 with SH INFUSION CHAIR 4   [facility] ([facility])    [facility] access [facility] [place]  [address]  [place] [address]   [phone]            Dec 05, 2018  8:00 AM CST   Level 7 with SH INFUSION CHAIR 4   [facility] ([facility])    Formerly [facility] [place]  [address]  [place] [address]   [phone]              Additional Services     Home Care OT Referral for Hospital Discharge       OT to eval and treat ADL's/IADL's, energy conservation    Your provider has ordered home care - occupational therapy. If you have not been contacted within 2 days of your discharge please call the department phone number listed on the top of this document.            Home Care PT Referral for Hospital Discharge       PT to eval and treat mobility, strengthening, home safety    Your provider has ordered home care - physical therapy. If you have not been contacted within 2 days of your discharge please call the department phone number listed on the top of this document.            Home Care Social Service Referral for Hospital Discharge        to assess for community resources    Your provider has ordered home care - . If you have not been contacted within 2 days of your discharge please call the department phone number listed on the top of this document.            Home care nursing referral       RN skilled nursing visit. RN to assess  "vital signs and weight, respiratory and cardiac status, pain level and activity tolerance, incision for signs/symptoms of infection, hydration, nutrition and bowel status and home safety.  RN to teach medication management and pain management, incisional cares.  Home health aide to assist with bathing/ADL's    Home care services to begin within 48 hours of discharge from TCU    Your provider has ordered home care nursing services. If you have not been contacted within 2 days of your discharge please call the inpatient department phone number at [phone] .                  Further instructions from your care team       Toe touch weightbearing on left lower extremity. Hinged knee brace from 0-60 degrees of ROM. Recommend locking brace for standing and transfers until quad supports transfers and WB.    Use Tubi  to hold dressing in place and not an ace wrap.    Pending Results     No orders found from 11/4/2018 to 11/7/2018.            Statement of Approval     Ordered          11/20/18 1139  I have reviewed and agree with all the recommendations and orders detailed in this document.  EFFECTIVE NOW     Approved and electronically signed by:  Mary Sahu MD             Admission Information     Date & Time Provider Department Dept. Phone    11/6/2018 North Wisdom MD TR Transitional Care [phone]      Your Vitals Were     Blood Pressure Pulse Temperature Respirations Height Weight    99/58 (BP Location: Left arm) 81 98.5  F (36.9  C) (Oral) 18 1.746 m (5' 8.75\") 57.8 kg (127 lb 8 oz)    Last Period Pulse Oximetry BMI (Body Mass Index)             (LMP Unknown) 100% 18.97 kg/m2         Care EveryWhere ID     This is your Care EveryWhere ID. This could be used by other organizations to access your [facility] medical records  [id number]        Equal Access to Services     BRIE PALOMINO AH: Liset Medrano, pete jorge, kaylen issa " ah. So [facility] [phone].    ATENCIÓN: Si jeremie vizcaino, tiene a de oliveira disposición servicios gratuitos de asistencia lingüística. Mildred al [phone].    We comply with applicable federal civil rights laws and [place] laws. We do not discriminate on the basis of race, color, national origin, age, disability, sex, sexual orientation, or gender identity.               Review of your medicines      START taking        Dose / Directions    ferrous sulfate 325 (65 Fe) MG tablet   Commonly known as:  IRON   Indication:  Anemia From Inadequate Iron in the Body   Used for:  MS (multiple sclerosis) (H)        Dose:  325 mg   Take 1 tablet (325 mg) by mouth daily   Quantity:  100 tablet   Refills:  0         CONTINUE these medicines which have NOT CHANGED        Dose / Directions    acetaminophen 325 MG tablet   Commonly known as:  TYLENOL   Used for:  Multiple sclerosis exacerbation (H)        Dose:  650 mg   Take 2 tablets (650 mg) by mouth every 4 hours as needed for mild pain or fever (greater than 101 degrees)   Quantity:  100 tablet   Refills:  0       * Baclofen 5 MG Tabs   Used for:  Closed fracture of distal end of left femur, unspecified fracture morphology, initial encounter (H)        Dose:  25 mg   Take 25 mg by mouth every morning   Refills:  0       * Baclofen 5 MG Tabs   Used for:  Closed fracture of distal end of left femur, unspecified fracture morphology, initial encounter (H)        Dose:  35 mg   Take 35 mg by mouth every evening   Refills:  0       * Baclofen 5 MG Tabs   Used for:  MS (multiple sclerosis) (H)        Dose:  25 mg   Take 25 mg by mouth 2 times daily as needed for muscle spasms   Quantity:  90 tablet   Refills:  0       cholecalciferol 5000 units (125 mcg) Caps capsule   Commonly known as:  vitamin D3        Dose:  5000 Units   Take 5,000 Units by mouth daily   Refills:  0       enoxaparin 40 MG/0.4ML injection   Commonly known as:  LOVENOX   Indication:  DVT prophylaxis   Used for:  Closed  fracture of distal end of left femur, unspecified fracture morphology, initial encounter (H)        Dose:  40 mg   Inject 0.4 mLs (40 mg) Subcutaneous every 24 hours for 9 days   Quantity:  3.6 mL   Refills:  0       * gabapentin 300 MG capsule   Commonly known as:  NEURONTIN   Used for:  MS (multiple sclerosis) (H)        Dose:  900 mg   Take 3 capsules (900 mg) by mouth At Bedtime   Quantity:  90 capsule   Refills:  0       * gabapentin 300 MG capsule   Commonly known as:  NEURONTIN   Used for:  MS (multiple sclerosis) (H)        Dose:  600 mg   Take 2 capsules (600 mg) by mouth daily   Quantity:  90 capsule   Refills:  0       multivitamin, therapeutic with minerals Tabs tablet   Used for:  Anemia        Dose:  1 tablet   Take 1 tablet by mouth daily.   Refills:  0       NICOTROL 10 MG Inhaler   Generic drug:  nicotine        Dose:  1 Cartridge   Inhale 1 Cartridge into the lungs daily as needed for smoking cessation   Refills:  1       nitroFURantoin (macrocrystal-monohydrate) 100 MG capsule   Commonly known as:  MACROBID        Dose:  100 mg   Take 100 mg by mouth daily   Refills:  0       omeprazole 40 MG capsule   Commonly known as:  priLOSEC   Used for:  Gastroesophageal reflux disease, esophagitis presence not specified        TAKE 1 CAPSULE BY MOUTH EVERY DAY   Quantity:  90 capsule   Refills:  2       order for DME   Used for:  MS (multiple sclerosis) (H)        Equipment being ordered: medical lift chair   Quantity:  1 Units   Refills:  0       oxybutynin 10 MG 24 hr tablet   Commonly known as:  DITROPAN-XL   Used for:  Neurogenic bladder        Dose:  10 mg   Take 1 tablet (10 mg) by mouth 2 times daily   Quantity:  30 tablet   Refills:  0       polyethylene glycol Packet   Commonly known as:  MIRALAX/GLYCOLAX   Used for:  Multiple sclerosis exacerbation (H)        Dose:  17 g   Take 17 g by mouth daily   Quantity:  30 packet   Refills:  1       sertraline 100 MG tablet   Commonly known as:  ZOLOFT    Used for:  Major depression in complete remission (H)        TAKE 1.5 TABLETS BY MOUTH DAILY   Quantity:  135 tablet   Refills:  1       valACYclovir 500 MG tablet   Commonly known as:  VALTREX   Used for:  Herpes simplex disease        Dose:  500 mg   Take 1 tablet (500 mg) by mouth 2 times daily   Quantity:  12 tablet   Refills:  5       * Notice:  This list has 5 medication(s) that are the same as other medications prescribed for you. Read the directions carefully, and ask your doctor or other care provider to review them with you.      STOP taking     hydrOXYzine 25 MG tablet   Commonly known as:  ATARAX           oxyCODONE 5 MG tablet   Commonly known as:  ROXICODONE                Where to get your medicines      These medications were sent to [facility] [place], [place] - [address]  [address] [address]     Phone:  [phone]     enoxaparin 40 MG/0.4ML injection                Protect others around you: Learn how to safely use, store and throw away your medicines at www.disposemymeds.org.             Medication List: This is a list of all your medications and when to take them. Check marks below indicate your daily home schedule. Keep this list as a reference.      Medications           Morning Afternoon Evening Bedtime As Needed    acetaminophen 325 MG tablet   Commonly known as:  TYLENOL   Take 2 tablets (650 mg) by mouth every 4 hours as needed for mild pain or fever (greater than 101 degrees)   Last time this was given:  650 mg on 11/21/2018  7:57 AM                                * Baclofen 5 MG Tabs   Take 25 mg by mouth every morning   Last time this was given:  25 mg on 11/21/2018  7:58 AM                                * Baclofen 5 MG Tabs   Take 35 mg by mouth every evening   Last time this was given:  25 mg on 11/21/2018  7:58 AM                                * Baclofen 5 MG Tabs   Take 25 mg by mouth 2 times daily as needed for muscle spasms    Last time this was given:  25 mg on 11/21/2018  7:58 AM                                cholecalciferol 5000 units (125 mcg) Caps capsule   Commonly known as:  vitamin D3   Take 5,000 Units by mouth daily   Last time this was given:  5,000 Units on 11/21/2018  7:57 AM                                enoxaparin 40 MG/0.4ML injection   Commonly known as:  LOVENOX   Inject 0.4 mLs (40 mg) Subcutaneous every 24 hours for 9 days   Last time this was given:  40 mg on 11/20/2018  8:05 PM                                ferrous sulfate 325 (65 Fe) MG tablet   Commonly known as:  IRON   Take 1 tablet (325 mg) by mouth daily   Last time this was given:  325 mg on 11/21/2018  7:58 AM                                * gabapentin 300 MG capsule   Commonly known as:  NEURONTIN   Take 3 capsules (900 mg) by mouth At Bedtime   Last time this was given:  600 mg on 11/21/2018  7:57 AM                                * gabapentin 300 MG capsule   Commonly known as:  NEURONTIN   Take 2 capsules (600 mg) by mouth daily   Last time this was given:  600 mg on 11/21/2018  7:57 AM                                multivitamin, therapeutic with minerals Tabs tablet   Take 1 tablet by mouth daily.   Last time this was given:  1 tablet on 11/21/2018  7:58 AM                                NICOTROL 10 MG Inhaler   Inhale 1 Cartridge into the lungs daily as needed for smoking cessation   Generic drug:  nicotine                                nitroFURantoin (macrocrystal-monohydrate) 100 MG capsule   Commonly known as:  MACROBID   Take 100 mg by mouth daily   Last time this was given:  100 mg on 11/21/2018  7:58 AM                                omeprazole 40 MG capsule   Commonly known as:  priLOSEC   TAKE 1 CAPSULE BY MOUTH EVERY DAY   Last time this was given:  40 mg on 11/21/2018  7:58 AM                                order for DME   Equipment being ordered: medical lift chair                                oxybutynin 10 MG 24 hr tablet    Commonly known as:  DITROPAN-XL   Take 1 tablet (10 mg) by mouth 2 times daily   Last time this was given:  10 mg on 11/21/2018  7:58 AM                                polyethylene glycol Packet   Commonly known as:  MIRALAX/GLYCOLAX   Take 17 g by mouth daily   Last time this was given:  17 g on 11/12/2018  8:24 AM                                sertraline 100 MG tablet   Commonly known as:  ZOLOFT   TAKE 1.5 TABLETS BY MOUTH DAILY   Last time this was given:  150 mg on 11/20/2018  8:05 PM                                valACYclovir 500 MG tablet   Commonly known as:  VALTREX   Take 1 tablet (500 mg) by mouth 2 times daily   Last time this was given:  500 mg on 11/21/2018  7:58 AM                                * Notice:  This list has 5 medication(s) that are the same as other medications prescribed for you. Read the directions carefully, and ask your doctor or other care provider to review them with you.

## 2018-11-06 NOTE — IP AVS SNAPSHOT
[address] [address]    Phone:  [phone]                                       After Visit Summary   11/6/2018    Marylee Woods    MRN: [id number]           After Visit Summary Signature Page     I have received my discharge instructions, and my questions have been answered. I have discussed any challenges I see with this plan with the nurse or doctor.    ..........................................................................................................................................  Patient/Patient Representative Signature      ..........................................................................................................................................  Patient Representative Print Name and Relationship to Patient    ..................................................               ................................................  Date                                   Time    ..........................................................................................................................................  Reviewed by Signature/Title    ...................................................              ..............................................  Date                                               Time          22EPIC Rev 08/18

## 2018-11-06 NOTE — PROGRESS NOTES
Patient is a 63 year old female  admitted to room 404 via stretcher.  Patient is alert and oriented X 3. See Epic for VS and assessment.  Patient is able to transfer assist of 1 using walker. Patient was settled into their room, shown call light, tv, mealtimes etc. Oriented to unit. Will continue monitoring pain level and VS. Notifying MD with any concerns.  Follow MD orders for cares and medications.    Level of Schooling:college  Ethnicity:  Marital Status:  Dentures: Yes  Hearing Aid: No  Smoker:  No  Glasses: Yes  Occupation: on disability  Falls 0-1 mo: 2 2-6 mo: 3  Stairs prior function: Needed some help  Prior device use: Manual Wheelchair and Walker   Advanced Care Directive Referral to Social Work? Yes

## 2018-11-06 NOTE — PROGRESS NOTES
Pt seen, case reviewed with team    Pt feels well overall  Fair pain control  LE. + spasms L LE, helped most by prn + scheduled baclofen. Pt doesn't feel Robaxin is helpful  + BM yesterday  No chest pain, SOB or dizziness  Pt feels ready to discharge to TCU today    VSS  BP 110s/    Alert, fully oriented, appears well  Lungs clear  CV rrr  Abd soft, non-distended  L calf soft      No new labs      Assessment    1.  Left distal femur fracture 10/31.  S/P ORIF.  Adequate pain control. L LE spasms most helped by prn + scheduled baclofen  2.  Advanced multiple sclerosis  left-sided weakness.   3.  Essential hypertension, remains stable off PTA hydrochlorothiazide  4.  History of recurrent urinary tract infection.  On  Macrobid for suppression.  Urine culture 09/04/2018 did demonstrate greater than 100,000 colonies of Escherichia coli.  F/U UC neg 11/1.   5. History of pancytopenia attributed to prior treatment for multiple sclerosis. Mild thrombocytopenia, stable  6. Acute blood loss superimposed on normochromic normocytic anemia, stable  7.  Constipation, chronic 2nd to MS, aggravated by opiates, improved, with BM yesterday    Plan  Pt is medically stable for discharge to TCU  Discontinue Robaxin  Continue to hold hydrochlorothiazide   Lovenox for DVT proph

## 2018-11-06 NOTE — PLAN OF CARE
Problem: Surgery Nonspecified (Adult)  Goal: Signs and Symptoms of Listed Potential Problems Will be Absent, Minimized or Managed (Surgery Nonspecified)  Signs and symptoms of listed potential problems will be absent, minimized or managed by discharge/transition of care (reference Surgery Nonspecified (Adult) CPG).   Outcome: No Change          VS:     Pt A/O X 4. Afebrile. VSS. /64 (BP Location: Left arm)  Pulse 103  Temp 98.5  F (36.9  C) (Oral)  Resp 16  SpO2 99%.   Lungs- clear and equal bilaterally. IS encouraged. Denies nausea, shortness of breath, and chest pain.     Output:     Bowels- active in all four quadrants. Incontinent of bladder. LBM 11/4.      Activity:     Pt transfers assist of 1 with walker from bed to chair. Dorsiflexion and plantar flexion performed.    Skin: Edema to BLEs. Redness to right-side groin. Incision to left femur (UTV d/t dressing).      Pain:     Has pain in the Left posterior knee and given oxycodone 2.5mg for pain and is tolerating well.      CMS:     Baseline numbness and tingling in all extremities per pt report. Cap refill less than 3 seconds to BLEs. Pedial pulses +2.       Dressing:     Left leg incisional dressing is CDI with ACE wrap.       Diet:     Pt is on a Regular diet and appetite was good this shift.       LDA:     No PIV.     Equipment:     Walker, wheelchair, immobilization device for left leg. Removed once this shift for skin assessment. No redness/irritation noted.     Plan:     Pt to transfer to  TCU at 1330. Report given to Radha MOODY.

## 2018-11-06 NOTE — PROGRESS NOTES
Social Work Services Discharge Note     Patient Name:  Marylee Woods     Anticipated Discharge Date:  11/6/18  Discharge Disposition:   [facility]     Following MD:  [facility]     Pre-Admission Screening (PAS) online form has been completed.  The Level of Care (LOC) is:  Determined  Confirmation Code is:  [id number]  Patient/caregiver informed of referral to [facility] for Pre-Admission Screening for skilled nursing facility (SNF) placement and to expect a phone call post discharge from SNF.     Additional Services/Equipment Arranged:  Referral for PCA assessment to [facility].     Patient / Family response to discharge plan:  agreeable     Persons notified of above discharge plan:  Pt, 8A charge nurse Perlita Purcell Dr. Stein, and [facility]  Coretta     Staff Discharge Instructions:  Please fax discharge orders and signed hard scripts for any controlled substances.  Please print a packet and send with patient.      CTS Handoff completed:  YES     Medicare Notice of Rights provided to the patient/family:  Reviewed and signed

## 2018-11-06 NOTE — PROGRESS NOTES
"Orthopedics Daily Progress Note    S: no acute events overnight, afebrile. Muscle spasms continued but pain is better, and spasms better tolerated. Tolerating diet without n/v. Denies new n/t or chest pain/shortness of breath.     O: /64 (BP Location: Left arm)  Pulse 103  Temp 98.5  F (36.9  C) (Oral)  Resp 16  Ht 1.727 m (5' 8\")  Wt 56.7 kg (125 lb)  LMP  (LMP Unknown)  SpO2 99%  BMI 19.01 kg/m2    No acute distress  Non-labored respiration  LLE: distal exam deferred today     Labs:    Recent Labs  Lab 11/03/18  0501 11/02/18  0521 11/01/18  0556 10/31/18  1859    142 143 144   POTASSIUM 4.0 3.8 3.8 3.7   CHLORIDE 105 105 107 106   CO2 32 32 32 33*   BUN 17 20 20 22   CR 1.01 1.05* 0.83 1.00   * 95 86 98   MAG  --  1.8  --   --        Recent Labs  Lab 11/05/18  0633 11/04/18  0532 11/03/18  0501 11/02/18  0521 11/01/18  1916 11/01/18  0556 10/31/18  1859   WBC  --   --   --  4.0  --  4.3 7.1   HGB  --  8.0* 8.0* 9.0* 10.3* 9.6* 9.9*   * 83*  --  83*  --  105* 124*       Recent Labs  Lab 10/31/18  1859   INR 1.03       XR left ankle: shows non displaced fracture of the distal fibula, only visible on one view.     A/P:   Marylee Woods is a 63 year old female with MS who sustained a distal femur fracture on the left during a mechanical fall on 10/31 now s/p ORIF on 11/1.    -Ortho primary   -WB: TTWB LLE  -Activity: with assist and walker   -DVT ppx: lovenox x 4 weeks   -Abx: ancef x 24 hrs   -Post op fever, resolved   -PT/OT  -Brace: hinged knee brace unlocked 0-40 degrees; locked at 10 degrees when up given quad weakness  -Pain: PO as tolerated; baclofen and PRN robaxin   -Diet: ADAT   -Labs: follow hgb  -Imaging: complete  -Acute on chronic anemia: 1 uRBC in the OR on 11/1.  HGB stable   -Consults: hospitalist, JUSTIN   -Follow up: 2 weeks with Dr. Valadez for a wound check   -Dispo: patient ready for discharge, awaiting TCU placement, encouraged to send referrals to more " places.    Paco Gannon MD   Orthopedic Surgery; PGY-4  Pager: [phone]    For any questions regarding this patient please page me prior to paging the ortho resident on call.

## 2018-11-06 NOTE — PLAN OF CARE
"Problem: Patient Care Overview  Goal: Plan of Care/Patient Progress Review  Discharge Planner OT   Patient plan for discharge: TCU.  At baseline, patient was able to transfer to toilet with assist from spouse.  Patient reports fall/fx occurred with transfer to toilet and reports \"I will not be doing that anymore\".  At baseline, patient is incontinent of urine and wears 5 incontinence pads as goes through 5 per day and patient would be able to stand at walker and remove innermost incontinence pad without removing clothing and shoes.  Patient has been doing this since 2005 without skin breakdown issues.    Current status: Patient alert and oriented, agreeable to therapy.  A x 2 to assist with donning incontinence pads and completing toilet hygiene.  Supine to sit with Mod-Max A x 1.  Patient was able to stand at EOB with Min-Mod A x 2 and bed/chair moved behind patient.  Patient set up to complete g/h tasks while sitting in chair.  Sat up in chair for 2.5 hours and then assisted back to bed with Mod A x 2 to stand from chair and chair/bed moved behind patient.  A x 2 to assist patient to supine.  Patient improving with ability to reposition in bed and assist with sit to supine.  Barriers to return to prior living situation: MS/weakness (primarily L sided weakness at baseline), L LE fx with TTWB  Recommendations for discharge: TCU  Rationale for recommendations: Continued daily OT for maximum independence in ADLs/mobility       Entered by: Debbi Webster 11/06/2018 9:26 AM         Occupational Therapy Discharge Summary    Reason for therapy discharge:    Discharged to transitional care facility.    Progress towards therapy goal(s). See goals on Care Plan in [facility] electronic health record for goal details.  Goals partially met.  Barriers to achieving goals:   discharge from facility.    Therapy recommendation(s):    Continued therapy is recommended.  Rationale/Recommendations:  limited self-cares/mobility.      "

## 2018-11-06 NOTE — PLAN OF CARE
Problem: Surgery Nonspecified (Adult)  Goal: Signs and Symptoms of Listed Potential Problems Will be Absent, Minimized or Managed (Surgery Nonspecified)  Signs and symptoms of listed potential problems will be absent, minimized or managed by discharge/transition of care (reference Surgery Nonspecified (Adult) CPG).   Outcome: No Change  Pt is A&Ox4. VSS. LS clear, on RA. BS active, LBM 11/5. Voids in pure wick, new canister applied. Pain managed with 2.5mg oxycodone. LLE surgical dressing is c/d/i, hinged brace unlock at 0-40. Pt has baseline numbness in LEs. Continue to monitor.

## 2018-11-07 LAB
ANION GAP SERPL CALCULATED.3IONS-SCNC: 5 MMOL/L (ref 3–14)
BUN SERPL-MCNC: 17 MG/DL (ref 7–30)
CALCIUM SERPL-MCNC: 8.9 MG/DL (ref 8.5–10.1)
CHLORIDE SERPL-SCNC: 111 MMOL/L (ref 94–109)
CO2 SERPL-SCNC: 28 MMOL/L (ref 20–32)
CREAT SERPL-MCNC: 0.87 MG/DL (ref 0.52–1.04)
ERYTHROCYTE [DISTWIDTH] IN BLOOD BY AUTOMATED COUNT: 13.2 % (ref 10–15)
GFR SERPL CREATININE-BSD FRML MDRD: 66 ML/MIN/1.7M2
GLUCOSE SERPL-MCNC: 93 MG/DL (ref 70–99)
HCT VFR BLD AUTO: 24.4 % (ref 35–47)
HGB BLD-MCNC: 7.8 G/DL (ref 11.7–15.7)
MCH RBC QN AUTO: 30.1 PG (ref 26.5–33)
MCHC RBC AUTO-ENTMCNC: 32 G/DL (ref 31.5–36.5)
MCV RBC AUTO: 94 FL (ref 78–100)
PLATELET # BLD AUTO: 122 10E9/L (ref 150–450)
POTASSIUM SERPL-SCNC: 4 MMOL/L (ref 3.4–5.3)
RBC # BLD AUTO: 2.59 10E12/L (ref 3.8–5.2)
SODIUM SERPL-SCNC: 144 MMOL/L (ref 133–144)
WBC # BLD AUTO: 3.1 10E9/L (ref 4–11)

## 2018-11-07 PROCEDURE — 25000128 H RX IP 250 OP 636: Performed by: INTERNAL MEDICINE

## 2018-11-07 PROCEDURE — 99305 1ST NF CARE MODERATE MDM 35: CPT | Performed by: INTERNAL MEDICINE

## 2018-11-07 PROCEDURE — 25000132 ZZH RX MED GY IP 250 OP 250 PS 637: Mod: GY | Performed by: INTERNAL MEDICINE

## 2018-11-07 PROCEDURE — 12000022 ZZH R&B SNF

## 2018-11-07 PROCEDURE — A9270 NON-COVERED ITEM OR SERVICE: HCPCS | Mod: GY | Performed by: INTERNAL MEDICINE

## 2018-11-07 PROCEDURE — 97530 THERAPEUTIC ACTIVITIES: CPT | Mod: GP | Performed by: PHYSICAL THERAPIST

## 2018-11-07 PROCEDURE — 36415 COLL VENOUS BLD VENIPUNCTURE: CPT | Performed by: INTERNAL MEDICINE

## 2018-11-07 PROCEDURE — 40000193 ZZH STATISTIC PT WARD VISIT: Performed by: PHYSICAL THERAPIST

## 2018-11-07 PROCEDURE — 80048 BASIC METABOLIC PNL TOTAL CA: CPT | Performed by: INTERNAL MEDICINE

## 2018-11-07 PROCEDURE — 40000133 ZZH STATISTIC OT WARD VISIT

## 2018-11-07 PROCEDURE — 85027 COMPLETE CBC AUTOMATED: CPT | Performed by: INTERNAL MEDICINE

## 2018-11-07 PROCEDURE — 97162 PT EVAL MOD COMPLEX 30 MIN: CPT | Mod: GP | Performed by: PHYSICAL THERAPIST

## 2018-11-07 PROCEDURE — 97167 OT EVAL HIGH COMPLEX 60 MIN: CPT | Mod: GO

## 2018-11-07 PROCEDURE — 25000125 ZZHC RX 250: Performed by: INTERNAL MEDICINE

## 2018-11-07 PROCEDURE — 99207 ZZC CDG-MDM COMPONENT: MEETS LOW - DOWN CODED: CPT | Performed by: INTERNAL MEDICINE

## 2018-11-07 PROCEDURE — 97535 SELF CARE MNGMENT TRAINING: CPT | Mod: GO

## 2018-11-07 RX ORDER — HYDROXYZINE HYDROCHLORIDE 25 MG/1
25 TABLET, FILM COATED ORAL EVERY 6 HOURS PRN
Status: DISCONTINUED | OUTPATIENT
Start: 2018-11-07 | End: 2018-11-21 | Stop reason: HOSPADM

## 2018-11-07 RX ORDER — BACLOFEN 20 MG/1
20 TABLET ORAL DAILY PRN
Status: DISCONTINUED | OUTPATIENT
Start: 2018-11-07 | End: 2018-11-21 | Stop reason: HOSPADM

## 2018-11-07 RX ORDER — BISACODYL 10 MG
10 SUPPOSITORY, RECTAL RECTAL DAILY PRN
Status: DISCONTINUED | OUTPATIENT
Start: 2018-11-07 | End: 2018-11-21 | Stop reason: HOSPADM

## 2018-11-07 RX ADMIN — OXYCODONE HYDROCHLORIDE 5 MG: 5 TABLET ORAL at 09:10

## 2018-11-07 RX ADMIN — ENOXAPARIN SODIUM 40 MG: 40 INJECTION SUBCUTANEOUS at 22:32

## 2018-11-07 RX ADMIN — Medication 3 MG: at 22:39

## 2018-11-07 RX ADMIN — Medication 5 UNITS: at 10:55

## 2018-11-07 RX ADMIN — HYDROXYZINE HYDROCHLORIDE 25 MG: 25 TABLET ORAL at 01:20

## 2018-11-07 RX ADMIN — VALACYCLOVIR HYDROCHLORIDE 500 MG: 500 TABLET, FILM COATED ORAL at 09:15

## 2018-11-07 RX ADMIN — OXYBUTYNIN CHLORIDE 10 MG: 10 TABLET, FILM COATED, EXTENDED RELEASE ORAL at 16:19

## 2018-11-07 RX ADMIN — GABAPENTIN 900 MG: 300 CAPSULE ORAL at 22:32

## 2018-11-07 RX ADMIN — OXYCODONE HYDROCHLORIDE 2.5 MG: 5 TABLET ORAL at 01:20

## 2018-11-07 RX ADMIN — OXYBUTYNIN CHLORIDE 10 MG: 10 TABLET, FILM COATED, EXTENDED RELEASE ORAL at 09:04

## 2018-11-07 RX ADMIN — VALACYCLOVIR HYDROCHLORIDE 500 MG: 500 TABLET, FILM COATED ORAL at 22:33

## 2018-11-07 RX ADMIN — MULTIPLE VITAMINS W/ MINERALS TAB 1 TABLET: TAB at 09:04

## 2018-11-07 RX ADMIN — Medication 35 MG: at 22:32

## 2018-11-07 RX ADMIN — NITROFURANTOIN (MONOHYDRATE/MACROCRYSTALS) 100 MG: 75; 25 CAPSULE ORAL at 09:04

## 2018-11-07 RX ADMIN — OXYCODONE HYDROCHLORIDE 5 MG: 5 TABLET ORAL at 17:20

## 2018-11-07 RX ADMIN — BACLOFEN 20 MG: 20 TABLET ORAL at 17:19

## 2018-11-07 RX ADMIN — CHOLECALCIFEROL CAP 125 MCG (5000 UNIT) 5000 UNITS: 125 CAP at 09:04

## 2018-11-07 RX ADMIN — OMEPRAZOLE 40 MG: 20 CAPSULE, DELAYED RELEASE ORAL at 09:04

## 2018-11-07 RX ADMIN — SERTRALINE HYDROCHLORIDE 150 MG: 50 TABLET ORAL at 22:32

## 2018-11-07 RX ADMIN — Medication 25 MG: at 09:05

## 2018-11-07 RX ADMIN — GABAPENTIN 600 MG: 300 CAPSULE ORAL at 09:04

## 2018-11-07 NOTE — PLAN OF CARE
Problem: Goal Outcome Summary  Goal: Goal Outcome Summary  Will focus on slide board transfers--min of 2 today bed<>w/c to manage LE and safe movement across board. Pt will be NWB x 6 wk from 11/1; will hold off on gait goals until see how pt progresses.

## 2018-11-07 NOTE — PLAN OF CARE
"Problem: Goal Outcome Summary  Goal: Goal Outcome Summary  Ace wrap and hinged brace on left leg. Orthotics consult ordered for cam boot and to shorten the hinged brace. Patient informed and she said she has some \"boots\" at home and will have her  bring them in. Alert and correctly oriented and verbalizes her needs. Has been a patient here before and aware of nursing and therapy routine. Incontinent of urine. Good appetite. Participating in therapies.      "

## 2018-11-07 NOTE — PROGRESS NOTES
11/07/18 1404   Quick Adds   Quick Adds Certification   Type of Visit Initial Occupational Therapy Evaluation   Living Environment   Lives With spouse   Living Arrangements house   Home Accessibility ramps present at home;bed and bath on same level;tub/shower is not walk in  (claw foot bathtub)   Number of Stairs to Enter Home 0   Number of Stairs Within Home 0   Stair Railings at Home none   Living Environment Comment OT: pt lives on the mainlevel and complete sponge baths at home. Pt has a commode, power wheelchair, manual wheelchair, RW, 2 reachers.    Self-Care   Dominant Hand right   Usual Activity Tolerance moderate   Current Activity Tolerance fair   Regular Exercise yes   Activity/Exercise Type other (see comments)  (ot/PT 2 times a week)   Exercise Amount/Frequency 2 times/wk   Equipment Currently Used at Home commode;grab bar;walker, standard;wheelchair, power;wheelchair, manual   Functional Level Prior   Ambulation 3-->assistive equipment and person   Transferring 3-->assistive equipment and person   Toileting 3-->assistive equipment and person   Bathing 3-->assistive equipment and person   Dressing 2-->assistive person   Eating 0-->independent   Communication 0-->understands/communicates without difficulty   Swallowing 0-->swallows foods/liquids without difficulty   Cognition 0 - no cognition issues reported   Fall history within last six months yes   Number of times patient has fallen within last six months 2   Which of the above functional risks had a recent onset or change? ambulation;transferring;toileting;bathing;dressing;fall history   Prior Functional Level Comment OT: pt stated that she does most of the dressing but  helps out. He also helps with mobility, transfers to bathroom and with toileting. Pt sponge baths d/t shower being upstairs. Pt likes to read, easily distracted, games on the phone, [employer] projects,        Present no   General Information   Onset of  "Illness/Injury or Date of Surgery - Date 10/31/18   Referring Physician Dr. Wisdom   Patient/Family Goals Statement OT; per pt, \" I want to get strong enough to go back home and feel safe.\"    Precautions/Limitations fall precautions;other (see comments)   Weight-Bearing Status - LLE touch down weight-bearing  (cam boot, hinged knee extension brace)   General Observations OT; pt is very verbose and distracted. Pt has a femur fracture and has a hinged extension brace, cam boot for ankle fracture.    Cognitive Status Examination   Orientation orientation to person, place and time   Visual Perception   Visual Perception Wears glasses  (bifocals)   Sensory Examination   Sensory Comments OT: per pt, numbness and tingling in L hand currently.    Integumentary/Edema   Integumentary/Edema other (describe)   Integumentary/Edema Comments OT: LLE   Range of Motion (ROM)   ROM Comment OT; BUE WFL but c/o pain in the RUE during abduction.    Strength   Strength Comments OT: grossly 4/5 in all motions   Coordination   Upper Extremity Coordination No deficits were identified   Fine Motor Coordination OT: impaired finge rot nose bilaterally, opposition impaired R hand   Activities of Daily Living Analysis   Impairments Contributing to Impaired Activities of Daily Living balance impaired;coordination impaired;pain;postural control impaired;strength decreased   General Therapy Interventions   Planned Therapy Interventions ADL retraining;IADL retraining;bed mobility training;fine motor coordination training;motor coordination training;strengthening;transfer training;home program guidelines   Clinical Impression   Criteria for Skilled Therapeutic Interventions Met yes, treatment indicated   OT Diagnosis OT: generalized weakness, impaired adls/iadls   Influenced by the following impairments OT: pt currently demonstrates decreased ability to complete adls/iadls, impaired mobility, trasfers, endurance, TTWB on LLE. Skilled OT to improve " above mentioned deficits in order for a safe return home with  and assistance.    Assessment of Occupational Performance 5 or more Performance Deficits   Identified Performance Deficits OT: impaired social skills, home management, bathing, dressing, transfers   Clinical Decision Making (Complexity) High complexity   Therapy Frequency other (see comments)  (upto5- 6 times a week)   Predicted Duration of Therapy Intervention (days/wks) 3-4 weeks   Anticipated Equipment Needs at Discharge other (see comments)  (to be determined)   Anticipated Discharge Disposition Home;Home with Assist;Home with Home Therapy   Risks and Benefits of Treatment have been explained. Yes   Patient, Family & other staff in agreement with plan of care Yes   Clinical Impression Comments OT: pt currently demonstrates decreased ability to complete adls/iadls, impaired mobility, trasfers, endurance, TTWB on LLE. Skilled OT to improve above mentioned deficits in order for a safe return home with  and assistance.    Therapy Certification   Start of Care Date 11/07/18   Certification date from 11/07/18   Certification date to 12/05/18   Medical Diagnosis (femur fracture)   Certification I certify the need for these services furnished under this plan of treatment and while under my care.  (Physician co-signature of this document indicates review and certification of the therapy plan).   Total Evaluation Time   Total Evaluation Time (Minutes) 30

## 2018-11-07 NOTE — PROGRESS NOTES
11/07/18 1400   General Information   Patient Profile Review See Profile for full history and prior level of function   Daily Contact with Relatives or Friends Visit   Community Involvement   Community Involvement Retired   Media   Newspapers Daily   TV / Movies TV   Sports / Physical Activities   Sports Fan Other (comments)  (soccer)   Impression   Patient, family and / or staff in agreement with Plan of Care Yes   Treatment Plan   Independent Activities Pt. family visits daily and at times stays in room with patient. Pt. likes newspaper and watches t.v.    Structured Groups Social performances   Type of Intervention 1:1 intervention;Structured groups   One to One Therapeutic Intervention Hand massage;Volunteer   Equipment and Supplies While on Unit Newspaper   Assessment Assessment completed with patient present and family providing information for assessment.  Pt. family reports patient enjoys the news, likes to read the newspaper, Enjoys being social with family/friends.  Pt. will be invited to groups to increase socialization on unit and will have 1:1 visit from volunteer and/or recreational services during stay on unit.

## 2018-11-07 NOTE — PROGRESS NOTES
" 11/07/18 0800   Quick Adds   Quick Adds Certification   Type of Visit Initial PT Evaluation   Student Supervision-Eval Only    Student Supervision Direct supervision provided;Direct patient contact provided       Present no   Language English   Living Environment   Lives With spouse   Living Arrangements house   Home Accessibility bed and bath on same level;ramps present at home   Number of Stairs to Enter Home 0   Number of Stairs Within Home 0   Transportation Available family or friend will provide   Living Environment Comment PT: per patient, the shower is upstairs, so she gives herself sponge baths in the bathroom on the main level. She has a bed rail on her side of the bed at home which she uses when getting in/out of bed.   Self-Care   Dominant Hand right   Usual Activity Tolerance moderate   Current Activity Tolerance fair   Regular Exercise yes   Activity/Exercise Type other (see comments)  (PT/OT 2 times/week)   Exercise Amount/Frequency 2 times/wk   Equipment Currently Used at Home orthotic device;walker, rolling;wheelchair, manual;wheelchair, power  (gait belt)   Activity/Exercise/Self-Care Comment PT: patient primarily uses the manual wheelchair to get around in her home, and states she shoudl use the WW more to increase her LE strength. The power wheelchair is primarily used for long community distances - such as the state fair. She uses an AFO on her LLE. Most of her walking is being done in short distances with PT/OT.   Functional Level Prior   Ambulation 1-->assistive equipment   Transferring 3-->assistive equipment and person  (Occasionally  will help her transfer)   Toileting 1-->assistive equipment   Bathing 2-->assistive person   Dressing 2-->assistive person  (\" helps when we are in a hurry\")   Eating 0-->independent   Communication 0-->understands/communicates without difficulty   Swallowing 0-->swallows foods/liquids without difficulty   Cognition 0 - no " "cognition issues reported   Fall history within last six months yes   Number of times patient has fallen within last six months 2  (fall in Sep. w/o injury; fall in Oct. with L femur fracture)   Which of the above functional risks had a recent onset or change? ambulation;transferring;fall history   Prior Functional Level Comment PT: prior to admission, patient was taking short walks with PT/OT and primarily using her w/c in the home. She needed assist from her  occasionally for dressing and transferring, especially if in a hurry.  injured his rotator cuff so is limited currently in how much he can assist patient.    General Information   Onset of Illness/Injury or Date of Surgery - Date 10/31/18   Referring Physician [place] Wisdom MD; Dr. Vikash Alvares   Patient/Family Goals Statement \"I need to get my arms stronger to help support my leg\"   Pertinent History of Current Problem (include personal factors and/or comorbidities that impact the POC) Patient is a 63 year old female with MS who sustained a mechanical fall on 10/31/2018. She was caught in her wheelchair and fell out, landing on her left knee. She had immediate pain in that area. She denied any other injury, any head trauma, or loss of consciousness. She was unable to ambulate since the injury. She denied any new numbness or tingling. She underwent ORIF ofthe L distal femur on 11/01/2018. Surgery was uncomplicated, and the patient has done well post-operatively. Her history of multiple sclerosis which has impacted her ability to transition to home from hospital. [facility].   Precautions/Limitations fall precautions   Weight-Bearing Status - LUE full weight-bearing   Weight-Bearing Status - RUE full weight-bearing   Weight-Bearing Status - LLE toe touch weight-bearing   Weight-Bearing Status - RLE full weight-bearing   Heart Disease Risk Factors Lack of physical activity   General Observations PT: patient " in bed with hinged knee brace in place when entered room.   General Info Comments PT: according to patient, at baseline, her L side is weaker than her R secondary to MS.   Cognitive Status Examination   Orientation person;place;time   Level of Consciousness alert   Follows Commands and Answers Questions 100% of the time;able to follow multistep instructions;able to follow single-step instructions   Personal Safety and Judgment intact   Memory intact   Cognitive Comment PT: patient responses to questions were at times tangential, and she occasionally lost her train of thought. With cues she was able to finsih her thoughts.   Pain Assessment   Patient Currently in Pain (Pain in LLE)   Integumentary/Edema   Integumentary/Edema Comments PT: patient notes lymphedema at baseline, for which she wears compression stockings. She states her RLE looks to be at about normal. Her LLE is more swollen than usual, per the patient. Increased swelling on the top of L foot, sensitive to heavier touch.   Posture    Posture Forward head position   Range of Motion (ROM)   ROM Comment PT: LLE in hinged knee brace - did not assess. ROM of RLE and BUE WFL as evidenced through functional mobility.   Strength   Strength Comments PT: not formally assessed. Patient was unable to perform heel slide on RLE without assist of BUE - less than 3/5 for RLE. LLE not assessed due to pain and knee brace. Per patient, arms are stronger than her legs. BUE strength WFL as evidenced though slideboard transfer.    Bed Mobility   Bed Mobility Comments PT: patient unable to roll fully onto R side with use of bed rails alone - Charity needed. Scooting/bridging: Charity needed to help place patient's RLE in position to bridge/scoot. Patient then used bed rails and RLE to bridge. Charity of 1 to hold RLE in place and assist with moving LLE when scooting. Patient transferred supine to sit with modA of 1. Needed modA for management of primarily LLE to EOB. Once BLE were  dangling off EOB, patient pushed up through L arm and held onto therapists hand to pull herself up to sitting - modA. Supine to sit transfer with modA of 1 to place BLE in bed.   Bed Mobility Skill: Rolling/Turning   Level of [place]: Rolling/Turning minimum assist (75% patients effort)   Physical/Nonphysical Assist: Rolling/Turning 1 person assist   Assistive Device: Rolling/Turning bed rails   Bed Mobility Skill: Scooting/Bridging   Level of [place]: Scooting/Bridging minimum assist (75% patients effort)   Physical/Nonphysical Assist: Scooting/Bridging 2 persons   Assistive Device: Scooting/Bridging bed rails   Bed Mobility Skill: Sit to Supine   Level of [place]: Sit/Supine moderate assist (50% patients effort)   Physical Assist/Nonphysical Assist: Sit/Supine 1 person assist   Bed Mobility Skill: Supine to Sit   Level of [place]: Supine/Sit moderate assist (50% patients effort)   Physical Assist/Nonphysical Assist: Supine/Sit 1 person assist   Transfer Skills   Transfer Comments PT: patient completed slideboard transfer EOB<>w/c towards her R side with Charity of 2 persons. Charity of 1 for trunk and slideboard control. Charity of another to assist with the LLE.  Took increased time to complete the transfer. Patient used BUE and RLE to help push herself through the transfer.    Transfer Skill: Bed/Chair   Level of [place]: Bed/Chair minimum assist (75% patients effort)   Physical/Nonphysical Assist: Bed/Chair 2 persons  (second person there for safety)   Weightbearing Restrictions: Bed/Chair toe touch weight-bearing  (LLE ORIF with hinged knee brace)   Assistive Device: Bed/Chair other (see comments)  (slideboard)   Gait   Gait Comments Not assessed at this time - unsafe for patient due to medical condition   Balance   Balance Comments PT: static sitting balance was good (once established). Dynamic sitting balance fair, as arm motions occasionally caused patient to start losing balance posteriorly  - able to self-correct. Standing balance not assessed at this time.   Sensory Examination   Sensory Perception Comments PT: patient noted some numbness and tingling occasionally in BLE secondary to multiple sclerosis. No deficits noted.   Coordination   Coordination Comments PT: not formally assessed; however, patient able to brush hair and put hair up in pony tail - coordination appears intact through observation of mobility during evaluation/examination.   Muscle Tone   Muscle Tone no deficits were identified   Muscle Tone Comments PT: unable to assess LLE secondary to hinged knee brace and pain   Modality Interventions   Planned Modality Interventions Cryotherapy;Thermotherapy: Hydrocollator Packs   General Therapy Interventions   Planned Therapy Interventions ADL retraining;balance training;bed mobility training;gait training;groups;manual therapy;neuromuscular re-education;ROM;strengthening;stretching;transfer training;wheelchair management/propulsion training;risk factor education;home program guidelines;progressive activity/exercise   Clinical Impression   Criteria for Skilled Therapeutic Intervention yes, treatment indicated   PT Diagnosis impaired functional mobility   Influenced by the following impairments generalized weakness, impaired balance, LLE ORIF with hinged knee brace, WB precautions   Functional limitations due to impairments difficulty with bed mobility, transfers, gait   Clinical Presentation Evolving/Changing   Clinical Presentation Rationale personal factors, current medical status, medical history   Clinical Decision Making (Complexity) Moderate complexity   Therapy Frequency` other (see comments)  (6 times/week)   Predicted Duration of Therapy Intervention (days/wks) 11/20/2018   Anticipated Discharge Disposition Home with Home Therapy   Risk & Benefits of therapy have been explained Yes   Patient, Family & other staff in agreement with plan of care Yes   Clinical Impression Comments  Patient is a 63 year old female with multiple sclerosis who fell out of her wheelchair on 10/31/2018. She sustained a L distal femur fracture and underwent a L distal femur ORIF on 11/01/2018. She presents to physical therapy with decreased ability to perform bed mobility, transfers, and gait, secondary to generalized weakness, multiple sclerosis, impaired balance, and ORIF of LLE with knee brace and WB precautions. She would beneift from skilled PT services in order to address these impairements and return her to her prior level of function. Prognosis for return to home with continued therapy is good provided she makes progress toward her therapy goals. Prognosis for functional ambulation is fair to poor due to her WB restriction of LLE.   Therapy Certification   Start of care date 11/07/18   Certification date from 11/07/18   Certification date to 11/06/18   Medical Diagnosis L femur ORIF s/p L distal femur fracture; multiple sclerosis   Certification I certify the need for these services furnished under this plan of treatment and while under my care.  (Physician co-signature of this document indicates review and certification of the therapy plan).    Total Evaluation Time   Total Evaluation Time (Minutes) 30

## 2018-11-07 NOTE — PROGRESS NOTES
Social Work: Initial Assessment with Discharge Plan    Patient Name: Marylee Woods  : 1955  Age: 63 year old  MRN: [id number]  Completed assessment with: Pt  Admitted to TCU: 2018    Presenting Information   Date of SW assessment: 2018  Health Care Directive: Provided education  Primary Health Care Agent: Self  Secondary Health Care Agent: Pt's  would be NOK per policy   Living Situation: Pt lives with her  in [facility]  Previous Functional Status: Independent   DME available: See therapy notes   Patient and family understanding of hospitalization: Pt stated that she is here due to her leg, and needing additional therapy so she can return home.   Cultural/Language/Spiritual Considerations: English speaking  Abuse concerns: No concerns presented to writer   BIMS: Pt scored 15 on BIMS indicating cognitively intact.   PHQ-9: Pt scored 06 on PHQ-9 indicating mild depression   PAS: confirmation number- [id number]  Has there been a level II screen?  No  Were there any recommendations in the screen? N/A  If yes, will the recommendations we incorporated into the Plan of Care?  N/A  Physical Health  Reason for admission: Fracture of femur, distal, left, closed (H)    Provider Information   Primary Care Physician:Carolina Pulliam   : Pt does not have  or any [facility] services. Pt does not have a care coordinator and is not on Medical Assistance. Per chart review SW from the hospital called [facility] and began an referral. [facility] called the writer and did confirm the pt would need to be on MA to even start the PCA process. Pt has Medicare and BCBS for insurance.     Mental Health:   Diagnosis: Major depression in complete remission   Current Support/Services: Medication in past  Previous Services: NA  Services Needed/Recommended: Pt will continue her medication management.     Substance Use:  Diagnosis: No concerns   Current Support/Services:  NA  Previous Services: NA  Services Needed/Recommended: NA    Support System  Marital Status:    Family support: Pt has the support of her .   Other support available: Other family/friends in the community.   Gaps in support system: Pt is concerned of how much care the pt's  can provide.     Community Resources  Current in home services: NA  Previous services: [facility]- Closed as of February     Financial/Employment/Education  Employment Status: Retired   Income Source: SSDI  Education: College   Financial Concerns:  No concerns presented   Insurance: Medicare/BCBS      Discharge Plan   Patient and family discharge goal: Pt's goal is to return home with continued support.   Provided Education on discharge plan: YES  Patient agreeable to discharge plan:  YES  A list of Medicare Certified Facilities was provided to the patient and/or family to encourage patient choice. Based on location and rating, patient would like referrals made to: [facility]  General information regarding anticipated insurance coverage and possible out of pocket cost was discussed. Patient and patient's family are aware patient may incur the cost of transportation to the facility, pending insurance payment: YES  Barriers to discharge: Medical clearance, safe discharge plan, complete therapy goals.     Discharge Recommendations   Disposition: Home with continued support.   Transportation Needs: Family will transport   Name of Transportation Company and Phone: NA    Additional comments   Writer knows this pt from previous admission last year. Pt was pleasant and open to  services while here on TCU. Pt will receive care plan goals and orders tomorrow 11/8.     STEPHANY Beltran  U   P: [phone]  Pgr: [phone]               11/07/18 1100   Living Arrangements   Lives With spouse   Living Arrangements house   Able to Return to Prior Living Arrangements yes   Home Safety   Patient Feels Safe Living in Home? yes    Discharge Planning   Expected Discharge Date (TBD)   Anticipated Discharge Disposition home with home health   Discharge Needs Assessment   Patient/family verbalizes understanding of discharge plan recommendations? Yes   Transportation Available car;family or friend will provide

## 2018-11-07 NOTE — PROGRESS NOTES
Left ankle fracture on xray. Non displaced. Discussed with patient yesterday. Will recommend continue TTWB, hinged knee brace 0-40. Will place orthotics consult for short CAM boot to see if brace and boot can work together. Boot recommended for any WB activity.     Discussed with Dr. Valadez.     Paco Gannon MD  Orthopedic Surgery, PGY-4  Pager: [phone]

## 2018-11-07 NOTE — H&P
History and Physical     Marylee Woods MRN# [id number]   YOB: 1955 Age: 63 year old      Date of Admission:  11/6/2018      CC:  Generalized weakness.   Pain L LE. (muscle spasms)    HPI: Obtained from the patient, chart review, discharging provider.     63 year old Pleasant 63-year-old female admitted to hospital 10.31.18 with the following:   >>Left distal femur fracture 10/31 subsequent to a mechanical fall with transfer from the toilet to wheelchair.  Indication that her leg gave out.  No suggestion of near herminia syncope.  No other noted injury.   S/p Open reduction internal fixation left distal femur 11/1/2018.   Discharged to TCU 11/6/2018 for ongoing rehab needs.     At current reports pain L LE, mostly muscle spasms, better still not fully controlled. Difficulty sleeping last night 2.2 muscle spasms. Reported some anxiety and difficulty falling asleep last night 2/2 pain, new place. Anxiety.   Had BM yesterday. Incontinence w urine. No dysuria.   No fever or chills. No NV. No cp or sob or cough. Gen weakness. MS: stable.   No other concern.       I have reviewed this patient's Past Medical History, Past Surgical History, Allergies, Family History, Social History, Medications and updated it with pertinent information if needed.    Past Medical History:  Past Medical History:   Diagnosis Date     Gastro-oesophageal reflux disease      Multiple sclerosis (H)        Past Surgical History:  Past Surgical History:   Procedure Laterality Date     C/SECTION, LOW TRANSVERSE  1992     COLONOSCOPY  2007     COLONOSCOPY N/A 1/26/2017    Procedure: COMBINED COLONOSCOPY, SINGLE OR MULTIPLE BIOPSY/POLYPECTOMY BY BIOPSY;  Surgeon: Mayo Adkins MD, MD;  Location: [facility]     ESOPHAGOSCOPY, GASTROSCOPY, DUODENOSCOPY (EGD), COMBINED  1/26/2017    Dr. Adkins [facility]     ESOPHAGOSCOPY, GASTROSCOPY, DUODENOSCOPY (EGD), COMBINED N/A 1/26/2017    Procedure: COMBINED ESOPHAGOSCOPY, GASTROSCOPY, DUODENOSCOPY (EGD), BIOPSY  SINGLE OR MULTIPLE;  Surgeon: Mayo Adkins MD, MD;  Location:  GI     HIP SURGERY  2009    femur ortho surgery     LAPAROSCOPIC CHOLECYSTECTOMY  6/24/2014    Procedure: LAPAROSCOPIC CHOLECYSTECTOMY;  Surgeon: Randy Bailey MD;  Location: [facility]     OPEN REDUCTION INTERNAL FIXATION FEMUR DISTAL Left 11/1/2018    Procedure: OPEN REDUCTION INTERNAL FIXATION LEFT FEMUR DISTAL;  Surgeon: Jose Valadez MD;  Location: [facility]     OPEN REDUCTION INTERNAL FIXATION RODDING INTRAMEDULLARY TIBIA  4/14/2013    Procedure: OPEN REDUCTION INTERNAL FIXATION RODDING INTRAMEDULLARY TIBIA;;  Surgeon: Sajan Cast MD;  Location: [facility] OR     ORTHOPEDIC SURGERY  2009    surgery right upper femur fx near hip       Allergies:     Allergies   Allergen Reactions     Amantadine      hallucinations     Budesonide        Medications:    No current facility-administered medications on file prior to encounter.   Current Outpatient Prescriptions on File Prior to Encounter:  acetaminophen (TYLENOL) 325 MG tablet Take 2 tablets (650 mg) by mouth every 4 hours as needed for mild pain or fever (greater than 101 degrees)   baclofen 5 MG TABS Take 25 mg by mouth 2 times daily as needed for muscle spasms   baclofen 5 MG TABS Take 25 mg by mouth every morning   baclofen 5 MG TABS Take 35 mg by mouth every evening   cholecalciferol (VITAMIN D3) 5000 units CAPS capsule Take 5,000 Units by mouth daily   enoxaparin (LOVENOX) 40 MG/0.4ML injection Inject 0.4 mLs (40 mg) Subcutaneous every 24 hours for 23 days   gabapentin (NEURONTIN) 300 MG capsule Take 2 capsules (600 mg) by mouth daily   gabapentin (NEURONTIN) 300 MG capsule Take 3 capsules (900 mg) by mouth At Bedtime   hydrOXYzine (ATARAX) 25 MG tablet Take 1 tablet (25 mg) by mouth every 6 hours as needed for other (adjuvant pain)   multivitamin, therapeutic with minerals (THERA-VIT-M) TABS Take 1 tablet by mouth daily.   NICOTROL 10 MG inhaler Inhale 1 Cartridge into the lungs  daily as needed for smoking cessation    nitroFURantoin, macrocrystal-monohydrate, (MACROBID) 100 MG capsule Take 100 mg by mouth daily   omeprazole (PRILOSEC) 40 MG capsule TAKE 1 CAPSULE BY MOUTH EVERY DAY   ORDER FOR DME Equipment being ordered: medical lift chair   oxybutynin (DITROPAN-XL) 10 MG 24 hr tablet Take 1 tablet (10 mg) by mouth 2 times daily   oxyCODONE IR (ROXICODONE) 5 MG tablet Take 0.5-1 tablets (2.5-5 mg) by mouth every 3 hours as needed for moderate to severe pain (give half tablet for pain 4-6/10 and 1 tablet 7-10/10)   polyethylene glycol (MIRALAX/GLYCOLAX) Packet Take 17 g by mouth daily   sertraline (ZOLOFT) 100 MG tablet TAKE 1.5 TABLETS BY MOUTH DAILY   valACYclovir (VALTREX) 500 MG tablet Take 1 tablet (500 mg) by mouth 2 times daily       Social History:  Social History     Social History     Marital status:      Spouse name: N/A     Number of children: N/A     Years of education: N/A     Occupational History     Not on file.     Social History Main Topics     Smoking status: Current Every Day Smoker     Packs/day: 0.25     Types: Cigarettes     Smokeless tobacco: Former User     Quit date: 12/16/2017     Alcohol use 0.0 oz/week     0 Standard drinks or equivalent per week      Comment: 1/wk     Drug use: No     Sexual activity: Yes     Partners: Male     Other Topics Concern     Not on file     Social History Narrative       Family History:   Family History   Problem Relation Age of Onset     HEART DISEASE Maternal Grandmother      Cancer Maternal Grandfather      HEART DISEASE Paternal Grandfather      HEART DISEASE Mother      HEART DISEASE Father      Diabetes No family hx of      Coronary Artery Disease No family hx of      Hypertension No family hx of      Hyperlipidemia No family hx of      Cerebrovascular Disease No family hx of      Breast Cancer No family hx of      Colon Cancer No family hx of      Prostate Cancer No family hx of      Other Cancer No family hx of       "Depression No family hx of      Anxiety Disorder No family hx of      Mental Illness No family hx of      Substance Abuse No family hx of      Anesthesia Reaction No family hx of      Asthma No family hx of      Osteoporosis No family hx of      Genetic Disorder No family hx of      Thyroid Disease No family hx of      Obesity No family hx of      Unknown/Adopted No family hx of          ROS:  The remainder of the complete ROS was negative unless noted in the HPI.      Exam:    /60 (BP Location: Left arm)  Pulse 74  Temp 99.4  F (37.4  C) (Oral)  Resp 18  Ht 1.746 m (5' 8.75\")  LMP  (LMP Unknown)  SpO2 98%  BMI 18.59 kg/m2    Temp (24hrs), Av.4  F (37.4  C), Min:99.4  F (37.4  C), Max:99.4  F (37.4  C)      Wt Readings from Last 5 Encounters:   10/31/18 56.7 kg (125 lb)   17 59 kg (130 lb)   17 59.1 kg (130 lb 4.8 oz)   17 64.5 kg (142 lb 3.2 oz)   17 58.5 kg (129 lb)       General: Alert, interactive, NAD  HEENT: AT/NC, sclera anicteric, PERRL, moist MM  Neck: Supple, no JVD or lymphadenopathy  Resp/chest: clear to auscultation bilaterally, no crackles or wheezes  Cardiac/Heart: s1s2 regular rate and rhythm,  GI/Abdomen: Soft, nontender, nondistended. +BS.  No rebound or guarding.  MSK/Extremities: distally warm and well perfused.   Skin: Warm and dry, no jaundice or rash on exposed areas.   Neuro: Alert & oriented x 3, Cns 2-12 intact,   Psychiatry: stable mood.       Labs:    BMP    Recent Labs  Lab 18  0658 18  0501 18  0521 18  0556    143 142 143   POTASSIUM 4.0 4.0 3.8 3.8   CHLORIDE 111* 105 105 107   JORDON 8.9 8.4* 8.3* 8.7   CO2 28 32 32 32   BUN 17 17 20 20   CR 0.87 1.01 1.05* 0.83   GLC 93 101* 95 86     CBC  Recent Labs  Lab 18  0658 18  0633 18  0532  18  0521  18  0556   WBC 3.1*  --   --   --  4.0  --  4.3   RBC 2.59*  --   --   --  2.92*  --  3.21*   HGB 7.8*  --  8.0*  < > 9.0*  < > 9.6*   HCT 24.4*  " --   --   --  27.5*  --  30.3*   MCV 94  --   --   --  94  --  94   MCH 30.1  --   --   --  30.8  --  29.9   MCHC 32.0  --   --   --  32.7  --  31.7   RDW 13.2  --   --   --  13.7  --  13.6   * 108* 83*  --  83*  --  105*   < > = values in this interval not displayed.  INRNo lab results found in last 7 days.  LFTsNo lab results found in last 7 days.   PANCNo lab results found in last 7 days.    Imaging: No results found for this or any previous visit (from the past 24 hour(s)).          Assessment/ Plan:       # Left distal femur fracture 10/31.  S/P ORIF 11.01.   L LE pain, muscle spasms most helped by prn + scheduled baclofen (25mg, 30 mg), ordered 20 mg daily prn for muscle spasms as requested by the patient.   Oxycodone prn- minimize/avoid as able.   Acetaminophen prn.   Gabapentin 600, 900 mg  Hydroxyzine prn - adjuvant pain, anxiety, sleep.     PT/OT consult. Therapy as tolerated  -WB: TTWB LLE  -Activity: with assist and walker   DVT PPx: Per Ortho: Lovenox SQ total 4 weeks.       #  Advanced multiple sclerosis  diagnosed 1993, manifested again predominantly by left-sided weakness w Urinary incontinence ? Neurogenic bladder. Last treated with Lemtrada 5-day infusion last year.   Stable at current. Follows Neurology as outpatient.   - Continue Valacyclovir 500 bid ppx, Oxybutynin.   - Outpatient follow-up with Neurology.     # History of pancytopenia attributed to prior treatment for multiple sclerosis. Mild thrombocytopenia, stable  - follow-up CBC weekly    # Essential hypertension, remains stable off PTA hydrochlorothiazide  - Ct to monitor.   - Ct to hold hydrochlorothiazide    # History of recurrent urinary tract infection.  On Nitrofurantoin for suppression.  11.01 recent Urine culture: no growth.    - Continue home Nitrofurantoin.     # Acute blood loss superimposed on normochromic normocytic anemia, stable    # Constipation, chronic 2nd to MS, aggravated by opiates, improved, with BM yesterday:    - Ct bowel regimen.     # Depression, anxiety: mood stable.   - Continue home sertraline 150 mg daily.     # Pain Assessment:  Current Pain Score 11/7/2018   Patient currently in pain? yes   Pain score (0-10) -   Pain location Leg   Pain descriptors Aching;Cramping   Pain Mx as above.       FEN:   Active Diet Order      Regular Diet Adult  Prophylaxis: Lovenox.   CODE: full.     Immunization: uptodate in influenza and Pneumo vaccine. Not due per VICKI.     Dispo: [facility]STER    D/w RN, JUSTIN Wisdom MD   Hospitalist (Internal Medicine)  [facility]  Pager: [phone].

## 2018-11-07 NOTE — PLAN OF CARE
Problem: Goal Outcome Summary  Goal: Goal Outcome Summary  Patient alert and oriented x 4. Patient complained of L leg pain-medicated with oxycodone and atarax- patient reported helpful and effective. Patient L knee hinged brace on-dressing with ace wrap intact. B heels elevated on rolled towels, pillow in between legs applied to prevent abduction. Patient sleeps on and off this shift. Will continue with current plan of care.

## 2018-11-07 NOTE — PROGRESS NOTES
Patient wanted to discuss the Cam boot with her . I will follow up with the patient tomorrow.   Brown RETANA,ANABELLA

## 2018-11-08 DIAGNOSIS — R91.8 PULMONARY NODULES: ICD-10-CM

## 2018-11-08 DIAGNOSIS — F33.42 MAJOR DEPRESSIVE DISORDER, RECURRENT, IN FULL REMISSION (H): ICD-10-CM

## 2018-11-08 DIAGNOSIS — I10 ESSENTIAL HYPERTENSION: ICD-10-CM

## 2018-11-08 DIAGNOSIS — D69.6 THROMBOCYTOPENIA (H): ICD-10-CM

## 2018-11-08 DIAGNOSIS — F32.5 MAJOR DEPRESSIVE DISORDER, SINGLE EPISODE, IN REMISSION (H): ICD-10-CM

## 2018-11-08 DIAGNOSIS — Z51.81 ENCOUNTER FOR THERAPEUTIC DRUG MONITORING: ICD-10-CM

## 2018-11-08 DIAGNOSIS — E78.5 HYPERLIPIDEMIA WITH TARGET LDL LESS THAN 130: ICD-10-CM

## 2018-11-08 DIAGNOSIS — G35 MULTIPLE SCLEROSIS (H): Primary | ICD-10-CM

## 2018-11-08 PROCEDURE — 97110 THERAPEUTIC EXERCISES: CPT | Mod: GP | Performed by: PHYSICAL THERAPIST

## 2018-11-08 PROCEDURE — 97530 THERAPEUTIC ACTIVITIES: CPT | Mod: GP | Performed by: PHYSICAL THERAPIST

## 2018-11-08 PROCEDURE — 25000128 H RX IP 250 OP 636: Performed by: INTERNAL MEDICINE

## 2018-11-08 PROCEDURE — A9270 NON-COVERED ITEM OR SERVICE: HCPCS | Mod: GY | Performed by: INTERNAL MEDICINE

## 2018-11-08 PROCEDURE — 97535 SELF CARE MNGMENT TRAINING: CPT | Mod: GO | Performed by: OCCUPATIONAL THERAPIST

## 2018-11-08 PROCEDURE — 40000193 ZZH STATISTIC PT WARD VISIT: Performed by: PHYSICAL THERAPIST

## 2018-11-08 PROCEDURE — 25000132 ZZH RX MED GY IP 250 OP 250 PS 637: Mod: GY | Performed by: INTERNAL MEDICINE

## 2018-11-08 PROCEDURE — 40000133 ZZH STATISTIC OT WARD VISIT: Performed by: OCCUPATIONAL THERAPIST

## 2018-11-08 PROCEDURE — 12000022 ZZH R&B SNF

## 2018-11-08 RX ADMIN — OXYBUTYNIN CHLORIDE 10 MG: 10 TABLET, FILM COATED, EXTENDED RELEASE ORAL at 09:31

## 2018-11-08 RX ADMIN — VALACYCLOVIR HYDROCHLORIDE 500 MG: 500 TABLET, FILM COATED ORAL at 09:32

## 2018-11-08 RX ADMIN — ACETAMINOPHEN 650 MG: 325 TABLET, FILM COATED ORAL at 09:38

## 2018-11-08 RX ADMIN — ENOXAPARIN SODIUM 40 MG: 40 INJECTION SUBCUTANEOUS at 20:52

## 2018-11-08 RX ADMIN — OXYCODONE HYDROCHLORIDE 5 MG: 5 TABLET ORAL at 13:39

## 2018-11-08 RX ADMIN — OMEPRAZOLE 40 MG: 20 CAPSULE, DELAYED RELEASE ORAL at 09:28

## 2018-11-08 RX ADMIN — NITROFURANTOIN (MONOHYDRATE/MACROCRYSTALS) 100 MG: 75; 25 CAPSULE ORAL at 09:31

## 2018-11-08 RX ADMIN — VALACYCLOVIR HYDROCHLORIDE 500 MG: 500 TABLET, FILM COATED ORAL at 20:52

## 2018-11-08 RX ADMIN — MULTIPLE VITAMINS W/ MINERALS TAB 1 TABLET: TAB at 09:30

## 2018-11-08 RX ADMIN — Medication 25 MG: at 09:33

## 2018-11-08 RX ADMIN — Medication 3 MG: at 20:50

## 2018-11-08 RX ADMIN — CHOLECALCIFEROL CAP 125 MCG (5000 UNIT) 5000 UNITS: 125 CAP at 09:29

## 2018-11-08 RX ADMIN — GABAPENTIN 900 MG: 300 CAPSULE ORAL at 20:52

## 2018-11-08 RX ADMIN — GABAPENTIN 600 MG: 300 CAPSULE ORAL at 09:30

## 2018-11-08 RX ADMIN — ACETAMINOPHEN 650 MG: 325 TABLET, FILM COATED ORAL at 20:50

## 2018-11-08 RX ADMIN — OXYBUTYNIN CHLORIDE 10 MG: 10 TABLET, FILM COATED, EXTENDED RELEASE ORAL at 16:49

## 2018-11-08 RX ADMIN — SERTRALINE HYDROCHLORIDE 150 MG: 50 TABLET ORAL at 20:52

## 2018-11-08 RX ADMIN — Medication 35 MG: at 20:50

## 2018-11-08 RX ADMIN — HYDROXYZINE HYDROCHLORIDE 25 MG: 25 TABLET ORAL at 20:50

## 2018-11-08 NOTE — PROGRESS NOTES
I did follow up with patient and she has decided to decline the Short cam boot at this time.   Brown RETANALO.

## 2018-11-08 NOTE — PROGRESS NOTES
11/08/18 1130   General Information   Patient Profile Review See Profile for full history and prior level of function   Daily Contact with Relatives or Friends Phone call;Visit   Pets Cat  (2 cats)   Community Involvement   Community Involvement Disabled   Spiritual Practice Bahai   [facility] ? (pt. spouse is )   Hobbies/Interests   Cards Cribbage   Media   TV / Movies TV;Movie list   Reading Books;Has own reading materials   Impression   Open to Socializing with Others Independent   Barriers to Leisure No barriers / independent   Patient, family and / or staff in agreement with Plan of Care Yes   Treatment Plan   Independent Activities Pt. enjoys reading, watching t.v and likes visitors   Structured Groups Game groups;Social performances   Type of Intervention Independent with activity;1:1 intervention;Structured groups   One to One Therapeutic Intervention Hand massage;Volunteer   Equipment and Supplies While on Unit Movies   Assessment Assessment completed with patient. Pt. was patient on unit in past re educating patient on resources/activites provided on unit. Pt. will be invited to group activites although does report unsure if will attend them during stay . Pt.is interested in hand massage during stay and agreeable for  recreational services to monitor throughout stay to assure needs are being met.

## 2018-11-08 NOTE — PLAN OF CARE
Problem: Goal Outcome Summary  Goal: Goal Outcome Summary  PT: patient improving in her slideboard transfers to both her R and L sides; Charity of 1 to help with LLE and stand-by assist of 1 for safety. Patient mentioned she would like to keep brace locked when in bed to help with positioning of her LLE.

## 2018-11-08 NOTE — PHARMACY-MEDICATION REGIMEN REVIEW
Pharmacy Medication Regimen Review  Marylee Woods is a 63 year old female who is currently in the Transitional Care Unit.    Assessment: Upon review of the medications and patient chart the following irregularities were found: Medications without appropriate indications: Sertraline  Antimicrobes: nitrofurantoin po daily is for UTI suppression, valacyclovir is for prophylaxis of Herpes simplex     Plan:   Please clarify sertraline indication. Please assess the need of prn hydroxyzine every 14 days.  Continue current treatment.  Attending provider will be sent this note for review.  If there are any emergent issues noted above, pharmacist will contact provider directly by phone.      Pharmacy will periodically review the resident's medication regimen for any PRN medications not administered in > 72 hours and discontinue them. The pharmacist will discuss gradual dose reductions of psychopharmacologic medications with interdisciplinary team on a regular basis.    Please contact pharmacy if the above does not answer specific medication questions/concerns.    Background:  A pharmacist has reviewed all medications and pertinent medical history today.  Medications were reviewed for appropriate use and any irregularities found are listed with recommendations.      Current Facility-Administered Medications:      [START ON 11/9/2018] - Skin Test Reading -, , Does not apply, Q21 Days, [place] Wisdom MD     acetaminophen (TYLENOL) tablet 650 mg, 650 mg, Oral, Q4H PRN, [place] Wisdom MD     baclofen (LIORESAL) 5 mg, baclofen (LIORESAL) 20 mg, 25 mg, Oral, QAM, [place] Wisdom MD, 25 mg at 11/07/18 0905     baclofen (LIORESAL) half-tab 35 mg, 35 mg, Oral, QPM, [place] Wisdom MD, 35 mg at 11/07/18 2232     baclofen (LIORESAL) tablet 20 mg, 20 mg, Oral, Daily PRN, [place] Wisdom MD, 20 mg at 11/07/18 1719     bisacodyl (DULCOLAX) Suppository 10 mg, 10 mg, Rectal, Daily PRN, [place] Wisdom MD     calcium carbonate (TUMS)  chewable tablet 1,000 mg, 1,000 mg, Oral, 4x Daily PRN, [place] Wisdom MD     cholecalciferol (vitamin D3) capsule CAPS 5,000 Units, 5,000 Units, Oral, Daily, [place] Wisdom MD, 5,000 Units at 11/07/18 0904     enoxaparin (LOVENOX) injection 40 mg, 40 mg, Subcutaneous, Q24H, [place] Wisdom MD, 40 mg at 11/07/18 2232     eucerin cream, , Topical, Q1H PRN, [place] Wisdom MD     gabapentin (NEURONTIN) capsule 600 mg, 600 mg, Oral, Daily, [place] Wisdom MD, 600 mg at 11/07/18 0904     gabapentin (NEURONTIN) capsule 900 mg, 900 mg, Oral, At Bedtime, [place] Wisdom MD, 900 mg at 11/07/18 2232     hydrOXYzine (ATARAX) tablet 25 mg, 25 mg, Oral, Q6H PRN, [place] Wisdom MD     medication instructions, , Does not apply, Continuous PRN, [place] Wisdom MD     melatonin tablet 1-3 mg, 1-3 mg, Oral, At Bedtime PRN, [place] Wisdom MD, 3 mg at 11/07/18 2239     multivitamin, therapeutic with minerals (THERA-VIT-M) tablet 1 tablet, 1 tablet, Oral, Daily, [place] Wisdom MD, 1 tablet at 11/07/18 0904     naloxone (NARCAN) injection 0.1-0.4 mg, 0.1-0.4 mg, Intravenous, Q2 Min PRN, [place] Wisdom MD     nitroFURantoin (macrocrystal-monohydrate) (MACROBID) capsule 100 mg, 100 mg, Oral, Daily, [place] Wisdom MD, 100 mg at 11/07/18 0904     omeprazole (priLOSEC) CR capsule 40 mg, 40 mg, Oral, QAM, [place] Wisdom MD, 40 mg at 11/07/18 0904     ondansetron (ZOFRAN-ODT) ODT tab 4 mg, 4 mg, Oral, Q6H PRN **OR** ondansetron (ZOFRAN) injection 4 mg, 4 mg, Intravenous, Q6H PRN, [place] Wisdom MD     oxybutynin (DITROPAN-XL) 24 hr tablet 10 mg, 10 mg, Oral, BID, [place] Wisdom MD, 10 mg at 11/07/18 1619     oxyCODONE IR (ROXICODONE) half-tab 2.5-5 mg, 2.5-5 mg, Oral, Q3H PRN, [place] Wisdom MD, 5 mg at 11/07/18 1720     polyethylene glycol (MIRALAX/GLYCOLAX) Packet 17 g, 17 g, Oral, Daily, [place] Wisdom MD     sertraline (ZOLOFT) tablet 150 mg, 150 mg, Oral, Daily, [place] Wisdom MD, 150 mg at 11/07/18 0972      tuberculin injection 5 Units, 5 Units, Intradermal, Q21 Days, [place] Wisdom MD, 5 Units at 11/07/18 1055     valACYclovir (VALTREX) tablet 500 mg, 500 mg, Oral, BID, [place] Wisdom MD, 500 mg at 11/07/18 7917  No current outpatient prescriptions on file.   PMH: Left distal femur fracture 10/31, S/P ORIF 11/01; Advanced multiple sclerosis manifested again predominantly by left-sided weakness with Urinary incontinence, ?Neurogenic bladder, h/o pancytopenia attributed to prior treatment for multiple sclerosis, Essential hypertension, h/o recurrent urinary tract infection, on Nitrofurantoin for suppression, Acute blood loss superimposed on normochromic normocytic anemia, chronic constipation 2nd to MS, aggravated by opiates.

## 2018-11-08 NOTE — PLAN OF CARE
Problem: Goal Outcome Summary  Goal: Goal Outcome Summary  Patient alert and oriented x 4. No complaints of pain and discomfort. Patient L hinged knee brace intact-ace wrap on to L leg. Patient appears sleeping resting in bed during rounds. B heels elevated on rolled towel. No respiratory distress noted. Will continue to monitor patient's status.

## 2018-11-08 NOTE — PLAN OF CARE
Problem: Goal Outcome Summary  Goal: Goal Outcome Summary  Pt VSS. Pain to L knee treated with PRN tylenol x1 and oxycodone x1, effective for pt. L brace intact, ACE wrap intact. Dressings to be changed tomorrow. Pt working with therapies, tolerating well per pt report. Incontinent of urine, stool softener given, no BM this shift. Pt a/o and able to make needs known. Continue with POC.

## 2018-11-08 NOTE — PLAN OF CARE
Problem: Goal Outcome Summary  Goal: Goal Outcome Summary  OT: Completed full sponge bath with ADL routine. SB Min A x 2 EOB <> w/c, pt bringing manual w/c from home, replaced L standard leg rest with elevating leg rest to support LLE. Pt requiring Max A LB dressing in supine, Max A toilet hygiene and clothing management, and s/u UB dressing and grooming. Adjusted room set up to improve w/c accessibility to bathroom and to bedside.

## 2018-11-08 NOTE — PLAN OF CARE
Problem: Goal Outcome Summary  Goal: Goal Outcome Summary  RN: Pt alert and oriented, VSS. Pt has good appetite. Surgical incision is CDI, dressing changed. Pt is incontinent of urine, perineal skin care done per unit routine. Pain is well controlled with scheduled and PRN medication. Pt has no complaints. Continue with POC.

## 2018-11-09 LAB — PLATELET # BLD AUTO: 162 10E9/L (ref 150–450)

## 2018-11-09 PROCEDURE — 25000128 H RX IP 250 OP 636: Performed by: INTERNAL MEDICINE

## 2018-11-09 PROCEDURE — 40000193 ZZH STATISTIC PT WARD VISIT: Performed by: PHYSICAL THERAPIST

## 2018-11-09 PROCEDURE — 36416 COLLJ CAPILLARY BLOOD SPEC: CPT | Performed by: INTERNAL MEDICINE

## 2018-11-09 PROCEDURE — 40000133 ZZH STATISTIC OT WARD VISIT: Performed by: OCCUPATIONAL THERAPIST

## 2018-11-09 PROCEDURE — 12000022 ZZH R&B SNF

## 2018-11-09 PROCEDURE — 97110 THERAPEUTIC EXERCISES: CPT | Mod: GO | Performed by: OCCUPATIONAL THERAPIST

## 2018-11-09 PROCEDURE — 85049 AUTOMATED PLATELET COUNT: CPT | Performed by: INTERNAL MEDICINE

## 2018-11-09 PROCEDURE — A9270 NON-COVERED ITEM OR SERVICE: HCPCS | Mod: GY | Performed by: INTERNAL MEDICINE

## 2018-11-09 PROCEDURE — 25000132 ZZH RX MED GY IP 250 OP 250 PS 637: Mod: GY | Performed by: INTERNAL MEDICINE

## 2018-11-09 PROCEDURE — 97535 SELF CARE MNGMENT TRAINING: CPT | Mod: GO | Performed by: OCCUPATIONAL THERAPIST

## 2018-11-09 PROCEDURE — 97530 THERAPEUTIC ACTIVITIES: CPT | Mod: GP | Performed by: PHYSICAL THERAPIST

## 2018-11-09 PROCEDURE — 97110 THERAPEUTIC EXERCISES: CPT | Mod: GP | Performed by: PHYSICAL THERAPIST

## 2018-11-09 RX ORDER — AMOXICILLIN 250 MG
2 CAPSULE ORAL 2 TIMES DAILY PRN
Status: DISCONTINUED | OUTPATIENT
Start: 2018-11-09 | End: 2018-11-21 | Stop reason: HOSPADM

## 2018-11-09 RX ORDER — POLYETHYLENE GLYCOL 3350 17 G/17G
17 POWDER, FOR SOLUTION ORAL DAILY PRN
Status: DISCONTINUED | OUTPATIENT
Start: 2018-11-09 | End: 2018-11-21 | Stop reason: HOSPADM

## 2018-11-09 RX ADMIN — Medication 35 MG: at 20:18

## 2018-11-09 RX ADMIN — Medication 25 MG: at 09:13

## 2018-11-09 RX ADMIN — MULTIPLE VITAMINS W/ MINERALS TAB 1 TABLET: TAB at 09:15

## 2018-11-09 RX ADMIN — OXYBUTYNIN CHLORIDE 10 MG: 10 TABLET, FILM COATED, EXTENDED RELEASE ORAL at 16:59

## 2018-11-09 RX ADMIN — Medication 3 MG: at 20:17

## 2018-11-09 RX ADMIN — OMEPRAZOLE 40 MG: 20 CAPSULE, DELAYED RELEASE ORAL at 09:14

## 2018-11-09 RX ADMIN — GABAPENTIN 600 MG: 300 CAPSULE ORAL at 09:14

## 2018-11-09 RX ADMIN — VALACYCLOVIR HYDROCHLORIDE 500 MG: 500 TABLET, FILM COATED ORAL at 20:19

## 2018-11-09 RX ADMIN — SERTRALINE HYDROCHLORIDE 150 MG: 50 TABLET ORAL at 20:19

## 2018-11-09 RX ADMIN — ENOXAPARIN SODIUM 40 MG: 40 INJECTION SUBCUTANEOUS at 20:17

## 2018-11-09 RX ADMIN — HYDROXYZINE HYDROCHLORIDE 25 MG: 25 TABLET ORAL at 20:17

## 2018-11-09 RX ADMIN — ACETAMINOPHEN 650 MG: 325 TABLET, FILM COATED ORAL at 09:13

## 2018-11-09 RX ADMIN — GABAPENTIN 900 MG: 300 CAPSULE ORAL at 20:19

## 2018-11-09 RX ADMIN — CHOLECALCIFEROL CAP 125 MCG (5000 UNIT) 5000 UNITS: 125 CAP at 09:15

## 2018-11-09 RX ADMIN — ACETAMINOPHEN 650 MG: 325 TABLET, FILM COATED ORAL at 13:57

## 2018-11-09 RX ADMIN — BACLOFEN 20 MG: 20 TABLET ORAL at 13:57

## 2018-11-09 RX ADMIN — NITROFURANTOIN (MONOHYDRATE/MACROCRYSTALS) 100 MG: 75; 25 CAPSULE ORAL at 09:15

## 2018-11-09 RX ADMIN — ACETAMINOPHEN 650 MG: 325 TABLET, FILM COATED ORAL at 20:17

## 2018-11-09 RX ADMIN — OXYBUTYNIN CHLORIDE 10 MG: 10 TABLET, FILM COATED, EXTENDED RELEASE ORAL at 09:15

## 2018-11-09 RX ADMIN — VALACYCLOVIR HYDROCHLORIDE 500 MG: 500 TABLET, FILM COATED ORAL at 09:15

## 2018-11-09 NOTE — PLAN OF CARE
Problem: Goal Outcome Summary  Goal: Goal Outcome Summary  Pt stable. VSS. Pain to L knee treated with PRN tylenol x2, PRN baclofen given for muscle spasms L knee. Ice pack administered as well. Pt incontinent of urine, no BM this shift. Pt states had BM yesterday, pt declined miralax this AM. Per other staff, pt did not have BM 11/8. Brace/ ACE wrap/drsgs intact to L knee, drsgs to be changed on evening shift. Pt working well with therapies. Continue with POC for pt.

## 2018-11-09 NOTE — PLAN OF CARE
Problem: Goal Outcome Summary  Goal: Goal Outcome Summary  Progressing with SBA transfer--min A to R side. Educated pt on sitting position to sit on ischials--decrease sacral sitting. Cont per POC

## 2018-11-09 NOTE — PLAN OF CARE
Problem: Goal Outcome Summary  Goal: Goal Outcome Summary  Outcome: No Change  VSS. Alert and orientedx4. Able to used call light and make needs known. Incontinent of bladder. Pt stated she had a BM this afternoon. Pt would like to keep her hinged brace locked when in bed. Ace wraps CDI to LLE incision, wound dressing to be changed tomorrow as ordered. No new skin issue noted. Tylenol, atarax given per pts request for pain to L leg with relief. Denies any chest pain, SOB. Melatonin given prn at HS.    Temp: 98.3  F (36.8  C) Temp src: Oral BP: 125/56   Heart Rate: 67 Resp: 16 SpO2: 98 % O2 Device: None (Room air)

## 2018-11-09 NOTE — PLAN OF CARE
Problem: Goal Outcome Summary  Goal: Goal Outcome Summary  RN: Pt slept for entire of the shift, no c/o pain. Continue with POC.

## 2018-11-10 DIAGNOSIS — G35 MS (MULTIPLE SCLEROSIS) (H): Primary | ICD-10-CM

## 2018-11-10 PROCEDURE — 25000132 ZZH RX MED GY IP 250 OP 250 PS 637: Mod: GY | Performed by: INTERNAL MEDICINE

## 2018-11-10 PROCEDURE — 97535 SELF CARE MNGMENT TRAINING: CPT | Mod: GO | Performed by: OCCUPATIONAL THERAPIST

## 2018-11-10 PROCEDURE — A9270 NON-COVERED ITEM OR SERVICE: HCPCS | Mod: GY | Performed by: INTERNAL MEDICINE

## 2018-11-10 PROCEDURE — 40000133 ZZH STATISTIC OT WARD VISIT: Performed by: OCCUPATIONAL THERAPIST

## 2018-11-10 PROCEDURE — 25000128 H RX IP 250 OP 636: Performed by: INTERNAL MEDICINE

## 2018-11-10 PROCEDURE — 40000193 ZZH STATISTIC PT WARD VISIT: Performed by: PHYSICAL THERAPIST

## 2018-11-10 PROCEDURE — 12000022 ZZH R&B SNF

## 2018-11-10 PROCEDURE — 97530 THERAPEUTIC ACTIVITIES: CPT | Mod: GP | Performed by: PHYSICAL THERAPIST

## 2018-11-10 RX ORDER — PROMETHAZINE HYDROCHLORIDE 25 MG/ML
25 INJECTION, SOLUTION INTRAMUSCULAR; INTRAVENOUS
Status: CANCELLED
Start: 2018-12-03

## 2018-11-10 RX ORDER — HYDROXYZINE PAMOATE 50 MG/1
50 CAPSULE ORAL 3 TIMES DAILY PRN
Status: CANCELLED
Start: 2018-12-03

## 2018-11-10 RX ORDER — HYDROXYZINE PAMOATE 50 MG/1
50 CAPSULE ORAL ONCE
Status: CANCELLED
Start: 2018-12-03 | End: 2018-12-03

## 2018-11-10 RX ORDER — EPINEPHRINE 0.3 MG/.3ML
0.3 INJECTION SUBCUTANEOUS
Status: CANCELLED | OUTPATIENT
Start: 2018-12-03

## 2018-11-10 RX ORDER — MEPERIDINE HYDROCHLORIDE 50 MG/1
50 TABLET ORAL EVERY 4 HOURS PRN
Status: CANCELLED
Start: 2018-12-03

## 2018-11-10 RX ORDER — ACETAMINOPHEN 500 MG
1000 TABLET ORAL ONCE
Status: CANCELLED | OUTPATIENT
Start: 2018-12-03

## 2018-11-10 RX ORDER — MEPERIDINE HYDROCHLORIDE 50 MG/1
50 TABLET ORAL ONCE
Status: CANCELLED
Start: 2018-12-03 | End: 2018-12-03

## 2018-11-10 RX ORDER — DIPHENHYDRAMINE HYDROCHLORIDE 50 MG/ML
25 INJECTION INTRAMUSCULAR; INTRAVENOUS EVERY 6 HOURS PRN
Status: CANCELLED | OUTPATIENT
Start: 2018-12-03

## 2018-11-10 RX ORDER — IBUPROFEN 200 MG
800 TABLET ORAL EVERY 6 HOURS PRN
Status: CANCELLED | OUTPATIENT
Start: 2018-12-03

## 2018-11-10 RX ORDER — DIPHENHYDRAMINE HCL 25 MG
25 CAPSULE ORAL ONCE
Status: CANCELLED | OUTPATIENT
Start: 2018-12-03 | End: 2018-12-03

## 2018-11-10 RX ORDER — ACETAMINOPHEN 500 MG
1000 TABLET ORAL EVERY 4 HOURS PRN
Status: CANCELLED | OUTPATIENT
Start: 2018-12-03

## 2018-11-10 RX ADMIN — BACLOFEN 20 MG: 20 TABLET ORAL at 00:25

## 2018-11-10 RX ADMIN — OXYBUTYNIN CHLORIDE 10 MG: 10 TABLET, FILM COATED, EXTENDED RELEASE ORAL at 10:14

## 2018-11-10 RX ADMIN — OXYCODONE HYDROCHLORIDE 5 MG: 5 TABLET ORAL at 00:25

## 2018-11-10 RX ADMIN — HYDROXYZINE HYDROCHLORIDE 25 MG: 25 TABLET ORAL at 21:04

## 2018-11-10 RX ADMIN — OXYBUTYNIN CHLORIDE 10 MG: 10 TABLET, FILM COATED, EXTENDED RELEASE ORAL at 16:31

## 2018-11-10 RX ADMIN — Medication 25 MG: at 09:02

## 2018-11-10 RX ADMIN — CALCIUM CARBONATE (ANTACID) CHEW TAB 500 MG 1000 MG: 500 CHEW TAB at 11:35

## 2018-11-10 RX ADMIN — NITROFURANTOIN (MONOHYDRATE/MACROCRYSTALS) 100 MG: 75; 25 CAPSULE ORAL at 10:14

## 2018-11-10 RX ADMIN — Medication 35 MG: at 21:04

## 2018-11-10 RX ADMIN — ACETAMINOPHEN 650 MG: 325 TABLET, FILM COATED ORAL at 21:04

## 2018-11-10 RX ADMIN — Medication 3 MG: at 21:04

## 2018-11-10 RX ADMIN — MULTIPLE VITAMINS W/ MINERALS TAB 1 TABLET: TAB at 10:13

## 2018-11-10 RX ADMIN — GABAPENTIN 900 MG: 300 CAPSULE ORAL at 21:03

## 2018-11-10 RX ADMIN — GABAPENTIN 600 MG: 300 CAPSULE ORAL at 10:12

## 2018-11-10 RX ADMIN — BACLOFEN 20 MG: 20 TABLET ORAL at 19:08

## 2018-11-10 RX ADMIN — SERTRALINE HYDROCHLORIDE 150 MG: 50 TABLET ORAL at 21:03

## 2018-11-10 RX ADMIN — ENOXAPARIN SODIUM 40 MG: 40 INJECTION SUBCUTANEOUS at 21:03

## 2018-11-10 RX ADMIN — POLYETHYLENE GLYCOL 3350 17 G: 17 POWDER, FOR SOLUTION ORAL at 10:08

## 2018-11-10 RX ADMIN — VALACYCLOVIR HYDROCHLORIDE 500 MG: 500 TABLET, FILM COATED ORAL at 10:15

## 2018-11-10 RX ADMIN — CHOLECALCIFEROL CAP 125 MCG (5000 UNIT) 5000 UNITS: 125 CAP at 10:13

## 2018-11-10 RX ADMIN — OMEPRAZOLE 40 MG: 20 CAPSULE, DELAYED RELEASE ORAL at 10:14

## 2018-11-10 RX ADMIN — ACETAMINOPHEN 650 MG: 325 TABLET, FILM COATED ORAL at 09:02

## 2018-11-10 RX ADMIN — VALACYCLOVIR HYDROCHLORIDE 500 MG: 500 TABLET, FILM COATED ORAL at 21:04

## 2018-11-10 ASSESSMENT — PAIN DESCRIPTION - DESCRIPTORS: DESCRIPTORS: ACHING;SORE

## 2018-11-10 NOTE — PLAN OF CARE
Problem: Goal Outcome Summary  Goal: Goal Outcome Summary  Pt stood in parallel bars with mod A sit<>stand, stood 40 sec. Improved slide board transfer--pt assisting more with placing board and adjusting L LE position. Pt reports does not think spouse will be able to help her physically with transfers due to rotator cuff issues. Cont per POC

## 2018-11-10 NOTE — PLAN OF CARE
Problem: Goal Outcome Summary  Goal: Goal Outcome Summary  Outcome: No Change  VSS. Alert and orientedx4. Denies any chest pain, SOB. Able to used call light and make needs known. A-1 for transfer, toileting and ADL's. Verbalized pain to L left, prn Tylenol and atarax given with relief. Pt also receiving scheduled gabapentin and baclofen. Appetite good. DEclined prn senna and dulcolax supp. Offered prune juice, pt complied. Abdomen soft and non tender, bowel sounds active. Per pt she had BM a day after admitted here but there was no record that she had one. Melatonin given at HS per pts request. Wound care done to L lateral knee, and L medial knee. Incontinent of bladder.   Will continue plan of care.  Temp: 98.9  F (37.2  C) Temp src: Oral BP: 123/55 Pulse: 72 Heart Rate: 69 Resp: 17 SpO2: 96 % O2 Device: None (Room air)

## 2018-11-10 NOTE — PLAN OF CARE
"Problem: Goal Outcome Summary  Goal: Goal Outcome Summary  Outcome: Therapy, progress toward functional goals as expected  OT Patient with incontinence at start of session. Patient appeared anxious regarding \"I haven't had my meds yet\". Able to participate fully once set up.  Sliding board setup and instruction sheet placed in patient room on her wall for safe transfer set up     "

## 2018-11-10 NOTE — PLAN OF CARE
Problem: Goal Outcome Summary  Goal: Goal Outcome Summary  Pt alert and oriented, able to direct cares. VSS. Pain in L knee/leg and muscle spasms treated with PRN tylenol and scheduled baclofen. Pt wearing hinge brace to L leg. Given scheduled miralax with positive results of large BM. Good PO intake. Working with therapies.  came to visit. Continue with POC for pt.

## 2018-11-10 NOTE — PLAN OF CARE
Problem: Goal Outcome Summary  Goal: Goal Outcome Summary  Outcome: No Change  Alert and oriented x4. Requested and received prn Oxycodone and Baclofen for left knee pain and spasms. Appeared to be sleeping in between encounters. Incontinent of bladder, calls for assistance. No BM overnight. Call light in reach. Continue with POC.

## 2018-11-10 NOTE — PROGRESS NOTES
Patient requested for her monthly labs per her Neurology, while she is here, including cbc w diff, bmp, UA, TSH, Ft4, CD4. Scheduled for 11.12.18.     North Wisdom MD   Hospitalist (Internal Medicine)  [facility]  Pager: [phone].

## 2018-11-11 PROCEDURE — 25000128 H RX IP 250 OP 636: Performed by: INTERNAL MEDICINE

## 2018-11-11 PROCEDURE — 12000022 ZZH R&B SNF

## 2018-11-11 PROCEDURE — A9270 NON-COVERED ITEM OR SERVICE: HCPCS | Mod: GY | Performed by: INTERNAL MEDICINE

## 2018-11-11 PROCEDURE — 25000132 ZZH RX MED GY IP 250 OP 250 PS 637: Mod: GY | Performed by: INTERNAL MEDICINE

## 2018-11-11 RX ADMIN — HYDROXYZINE HYDROCHLORIDE 25 MG: 25 TABLET ORAL at 20:48

## 2018-11-11 RX ADMIN — ACETAMINOPHEN 650 MG: 325 TABLET, FILM COATED ORAL at 20:48

## 2018-11-11 RX ADMIN — ACETAMINOPHEN 650 MG: 325 TABLET, FILM COATED ORAL at 13:37

## 2018-11-11 RX ADMIN — BACLOFEN 20 MG: 20 TABLET ORAL at 10:32

## 2018-11-11 RX ADMIN — POLYETHYLENE GLYCOL 3350 17 G: 17 POWDER, FOR SOLUTION ORAL at 08:59

## 2018-11-11 RX ADMIN — CHOLECALCIFEROL CAP 125 MCG (5000 UNIT) 5000 UNITS: 125 CAP at 09:00

## 2018-11-11 RX ADMIN — VALACYCLOVIR HYDROCHLORIDE 500 MG: 500 TABLET, FILM COATED ORAL at 20:48

## 2018-11-11 RX ADMIN — OMEPRAZOLE 40 MG: 20 CAPSULE, DELAYED RELEASE ORAL at 09:01

## 2018-11-11 RX ADMIN — ENOXAPARIN SODIUM 40 MG: 40 INJECTION SUBCUTANEOUS at 20:47

## 2018-11-11 RX ADMIN — OXYBUTYNIN CHLORIDE 10 MG: 10 TABLET, FILM COATED, EXTENDED RELEASE ORAL at 09:00

## 2018-11-11 RX ADMIN — MULTIPLE VITAMINS W/ MINERALS TAB 1 TABLET: TAB at 09:01

## 2018-11-11 RX ADMIN — NITROFURANTOIN (MONOHYDRATE/MACROCRYSTALS) 100 MG: 75; 25 CAPSULE ORAL at 08:58

## 2018-11-11 RX ADMIN — OXYBUTYNIN CHLORIDE 10 MG: 10 TABLET, FILM COATED, EXTENDED RELEASE ORAL at 16:52

## 2018-11-11 RX ADMIN — Medication 25 MG: at 08:58

## 2018-11-11 RX ADMIN — VALACYCLOVIR HYDROCHLORIDE 500 MG: 500 TABLET, FILM COATED ORAL at 09:02

## 2018-11-11 RX ADMIN — ACETAMINOPHEN 650 MG: 325 TABLET, FILM COATED ORAL at 08:57

## 2018-11-11 RX ADMIN — SERTRALINE HYDROCHLORIDE 150 MG: 50 TABLET ORAL at 20:48

## 2018-11-11 RX ADMIN — GABAPENTIN 600 MG: 300 CAPSULE ORAL at 08:59

## 2018-11-11 RX ADMIN — GABAPENTIN 900 MG: 300 CAPSULE ORAL at 20:49

## 2018-11-11 RX ADMIN — Medication 35 MG: at 20:48

## 2018-11-11 RX ADMIN — Medication 3 MG: at 20:47

## 2018-11-11 ASSESSMENT — PAIN DESCRIPTION - DESCRIPTORS
DESCRIPTORS: ACHING;SORE;SPASM
DESCRIPTORS: ACHING;SORE

## 2018-11-11 NOTE — PLAN OF CARE
Problem: Goal Outcome Summary  Goal: Goal Outcome Summary  Outcome: No Change  Alert and oriented x4. Makes needs known. Endorsed mild pain to left lower extremity with repositioning. Incontinent of bladder, assist of 1 with cares. LLE hinged brace in place. Call light in reach. Continue with POC.

## 2018-11-11 NOTE — PLAN OF CARE
Problem: Goal Outcome Summary  Goal: Goal Outcome Summary  Outcome: No Change  VSS. Alert oriented. Able to used call light and make needs known. A-1 person for ADL's, transfer and toileting. Incontinent of bladder. +BM today. Abdomen soft and non tender. Verbalized leg spasm w/c is not new to L leg, prn baclofen, tylenol and atarax given with relief. Melatonin at HS given per pts request. Hinged brace on even when pt in bed per pt's request. Ace wraps/ wound dressing CDI to L leg, dressing to be changed tomorrow.   Will continue pan of care.  Temp: 99.2  F (37.3  C) Temp src: Oral BP: 109/65   Heart Rate: 71 Resp: 17 SpO2: 99 % O2 Device: None (Room air)

## 2018-11-11 NOTE — PLAN OF CARE
Problem: Goal Outcome Summary  Goal: Goal Outcome Summary  Pt VSS. Alert and oriented. Pain in L leg, muscle spasms BLE. Given PRN tylenol x2 this shift and baclofen x1. Effective per pt verbal report. Hinge brace and dressing LLE intact. Pt resting/sleeping throughout the day. Continue with POC for pt.

## 2018-11-12 LAB
ALBUMIN UR-MCNC: NEGATIVE MG/DL
ANION GAP SERPL CALCULATED.3IONS-SCNC: 6 MMOL/L (ref 3–14)
APPEARANCE UR: CLEAR
BACTERIA #/AREA URNS HPF: ABNORMAL /HPF
BASOPHILS # BLD AUTO: 0 10E9/L (ref 0–0.2)
BASOPHILS NFR BLD AUTO: 0.6 %
BILIRUB UR QL STRIP: NEGATIVE
BUN SERPL-MCNC: 20 MG/DL (ref 7–30)
CALCIUM SERPL-MCNC: 9 MG/DL (ref 8.5–10.1)
CD3 CELLS # BLD: 173 CELLS/UL (ref 603–2990)
CD3 CELLS NFR BLD: 54 % (ref 49–84)
CD3+CD4+ CELLS # BLD: 76 CELLS/UL (ref 441–2156)
CD3+CD4+ CELLS NFR BLD: 24 % (ref 28–63)
CD3+CD4+ CELLS/CD3+CD8+ CLL BLD: 0.77 % (ref 1.4–2.6)
CD3+CD8+ CELLS # BLD: 98 CELLS/UL (ref 125–1312)
CD3+CD8+ CELLS NFR BLD: 31 % (ref 10–40)
CHLORIDE SERPL-SCNC: 111 MMOL/L (ref 94–109)
CO2 SERPL-SCNC: 27 MMOL/L (ref 20–32)
COLOR UR AUTO: YELLOW
CREAT SERPL-MCNC: 0.85 MG/DL (ref 0.52–1.04)
DIFFERENTIAL METHOD BLD: ABNORMAL
EOSINOPHIL # BLD AUTO: 0.1 10E9/L (ref 0–0.7)
EOSINOPHIL NFR BLD AUTO: 3.8 %
ERYTHROCYTE [DISTWIDTH] IN BLOOD BY AUTOMATED COUNT: 13.4 % (ref 10–15)
GFR SERPL CREATININE-BSD FRML MDRD: 67 ML/MIN/1.7M2
GLUCOSE SERPL-MCNC: 101 MG/DL (ref 70–99)
GLUCOSE UR STRIP-MCNC: NEGATIVE MG/DL
HCT VFR BLD AUTO: 23.3 % (ref 35–47)
HGB BLD-MCNC: 7.4 G/DL (ref 11.7–15.7)
HGB UR QL STRIP: NEGATIVE
IFC SPECIMEN: ABNORMAL
IMM GRANULOCYTES # BLD: 0 10E9/L (ref 0–0.4)
IMM GRANULOCYTES NFR BLD: 0.3 %
KETONES UR STRIP-MCNC: NEGATIVE MG/DL
LEUKOCYTE ESTERASE UR QL STRIP: ABNORMAL
LYMPHOCYTES # BLD AUTO: 0.4 10E9/L (ref 0.8–5.3)
LYMPHOCYTES NFR BLD AUTO: 12 %
MCH RBC QN AUTO: 29.8 PG (ref 26.5–33)
MCHC RBC AUTO-ENTMCNC: 31.8 G/DL (ref 31.5–36.5)
MCV RBC AUTO: 94 FL (ref 78–100)
MONOCYTES # BLD AUTO: 0.2 10E9/L (ref 0–1.3)
MONOCYTES NFR BLD AUTO: 6.6 %
MUCOUS THREADS #/AREA URNS LPF: PRESENT /LPF
NEUTROPHILS # BLD AUTO: 2.4 10E9/L (ref 1.6–8.3)
NEUTROPHILS NFR BLD AUTO: 76.7 %
NITRATE UR QL: NEGATIVE
NRBC # BLD AUTO: 0 10*3/UL
NRBC BLD AUTO-RTO: 0 /100
PH UR STRIP: 5 PH (ref 5–7)
PLATELET # BLD AUTO: 204 10E9/L (ref 150–450)
POTASSIUM SERPL-SCNC: 4.4 MMOL/L (ref 3.4–5.3)
RBC # BLD AUTO: 2.48 10E12/L (ref 3.8–5.2)
RBC #/AREA URNS AUTO: 2 /HPF (ref 0–2)
SODIUM SERPL-SCNC: 144 MMOL/L (ref 133–144)
SOURCE: ABNORMAL
SP GR UR STRIP: 1.01 (ref 1–1.03)
SQUAMOUS #/AREA URNS AUTO: 2 /HPF (ref 0–1)
T4 FREE SERPL-MCNC: 0.78 NG/DL (ref 0.76–1.46)
TSH SERPL DL<=0.005 MIU/L-ACNC: 1.04 MU/L (ref 0.4–4)
UROBILINOGEN UR STRIP-MCNC: NORMAL MG/DL (ref 0–2)
WBC # BLD AUTO: 3.2 10E9/L (ref 4–11)
WBC #/AREA URNS AUTO: 29 /HPF (ref 0–5)

## 2018-11-12 PROCEDURE — 99309 SBSQ NF CARE MODERATE MDM 30: CPT | Performed by: INTERNAL MEDICINE

## 2018-11-12 PROCEDURE — 40000133 ZZH STATISTIC OT WARD VISIT: Performed by: OCCUPATIONAL THERAPIST

## 2018-11-12 PROCEDURE — 97530 THERAPEUTIC ACTIVITIES: CPT | Mod: GO | Performed by: OCCUPATIONAL THERAPIST

## 2018-11-12 PROCEDURE — 80048 BASIC METABOLIC PNL TOTAL CA: CPT | Performed by: INTERNAL MEDICINE

## 2018-11-12 PROCEDURE — 97110 THERAPEUTIC EXERCISES: CPT | Mod: GO | Performed by: OCCUPATIONAL THERAPIST

## 2018-11-12 PROCEDURE — 85025 COMPLETE CBC W/AUTO DIFF WBC: CPT | Performed by: INTERNAL MEDICINE

## 2018-11-12 PROCEDURE — A9270 NON-COVERED ITEM OR SERVICE: HCPCS | Mod: GY | Performed by: INTERNAL MEDICINE

## 2018-11-12 PROCEDURE — 12000022 ZZH R&B SNF

## 2018-11-12 PROCEDURE — 84443 ASSAY THYROID STIM HORMONE: CPT | Performed by: INTERNAL MEDICINE

## 2018-11-12 PROCEDURE — 97110 THERAPEUTIC EXERCISES: CPT | Mod: GP | Performed by: PHYSICAL THERAPIST

## 2018-11-12 PROCEDURE — 25000128 H RX IP 250 OP 636: Performed by: INTERNAL MEDICINE

## 2018-11-12 PROCEDURE — 86359 T CELLS TOTAL COUNT: CPT | Performed by: INTERNAL MEDICINE

## 2018-11-12 PROCEDURE — 86360 T CELL ABSOLUTE COUNT/RATIO: CPT | Performed by: INTERNAL MEDICINE

## 2018-11-12 PROCEDURE — 36415 COLL VENOUS BLD VENIPUNCTURE: CPT | Performed by: INTERNAL MEDICINE

## 2018-11-12 PROCEDURE — 40000193 ZZH STATISTIC PT WARD VISIT: Performed by: PHYSICAL THERAPIST

## 2018-11-12 PROCEDURE — 99207 ZZC CDG-MDM COMPONENT: MEETS MODERATE - UP CODED: CPT | Performed by: INTERNAL MEDICINE

## 2018-11-12 PROCEDURE — 84439 ASSAY OF FREE THYROXINE: CPT | Performed by: INTERNAL MEDICINE

## 2018-11-12 PROCEDURE — 25000132 ZZH RX MED GY IP 250 OP 250 PS 637: Mod: GY | Performed by: INTERNAL MEDICINE

## 2018-11-12 PROCEDURE — 81001 URINALYSIS AUTO W/SCOPE: CPT | Performed by: INTERNAL MEDICINE

## 2018-11-12 PROCEDURE — 97530 THERAPEUTIC ACTIVITIES: CPT | Mod: GP | Performed by: PHYSICAL THERAPIST

## 2018-11-12 PROCEDURE — 87086 URINE CULTURE/COLONY COUNT: CPT | Performed by: INTERNAL MEDICINE

## 2018-11-12 RX ADMIN — ACETAMINOPHEN 650 MG: 325 TABLET, FILM COATED ORAL at 13:58

## 2018-11-12 RX ADMIN — ENOXAPARIN SODIUM 40 MG: 40 INJECTION SUBCUTANEOUS at 21:01

## 2018-11-12 RX ADMIN — GABAPENTIN 600 MG: 300 CAPSULE ORAL at 08:25

## 2018-11-12 RX ADMIN — SERTRALINE HYDROCHLORIDE 150 MG: 50 TABLET ORAL at 20:55

## 2018-11-12 RX ADMIN — Medication 35 MG: at 20:55

## 2018-11-12 RX ADMIN — OXYBUTYNIN CHLORIDE 10 MG: 10 TABLET, FILM COATED, EXTENDED RELEASE ORAL at 08:25

## 2018-11-12 RX ADMIN — OXYCODONE HYDROCHLORIDE 2.5 MG: 5 TABLET ORAL at 22:49

## 2018-11-12 RX ADMIN — GABAPENTIN 900 MG: 300 CAPSULE ORAL at 21:01

## 2018-11-12 RX ADMIN — OMEPRAZOLE 40 MG: 20 CAPSULE, DELAYED RELEASE ORAL at 08:24

## 2018-11-12 RX ADMIN — HYDROXYZINE HYDROCHLORIDE 25 MG: 25 TABLET ORAL at 20:55

## 2018-11-12 RX ADMIN — VALACYCLOVIR HYDROCHLORIDE 500 MG: 500 TABLET, FILM COATED ORAL at 20:55

## 2018-11-12 RX ADMIN — CHOLECALCIFEROL CAP 125 MCG (5000 UNIT) 5000 UNITS: 125 CAP at 08:25

## 2018-11-12 RX ADMIN — ACETAMINOPHEN 650 MG: 325 TABLET, FILM COATED ORAL at 20:55

## 2018-11-12 RX ADMIN — POLYETHYLENE GLYCOL 3350 17 G: 17 POWDER, FOR SOLUTION ORAL at 08:24

## 2018-11-12 RX ADMIN — OXYBUTYNIN CHLORIDE 10 MG: 10 TABLET, FILM COATED, EXTENDED RELEASE ORAL at 20:55

## 2018-11-12 RX ADMIN — VALACYCLOVIR HYDROCHLORIDE 500 MG: 500 TABLET, FILM COATED ORAL at 08:25

## 2018-11-12 RX ADMIN — Medication 25 MG: at 08:25

## 2018-11-12 RX ADMIN — BACLOFEN 20 MG: 20 TABLET ORAL at 05:37

## 2018-11-12 RX ADMIN — MULTIPLE VITAMINS W/ MINERALS TAB 1 TABLET: TAB at 08:25

## 2018-11-12 RX ADMIN — Medication 3 MG: at 20:55

## 2018-11-12 RX ADMIN — NITROFURANTOIN (MONOHYDRATE/MACROCRYSTALS) 100 MG: 75; 25 CAPSULE ORAL at 08:25

## 2018-11-12 NOTE — PLAN OF CARE
Problem: Goal Outcome Summary  Goal: Goal Outcome Summary  Outcome: No Change  Alert and orientedx4. Able to make needs known and used call light. Denies any SOB, chest pain. Appetite good. Fluid intake adequate. Incontinent of bladder. Dressing changed to L lateral knee  And upper thigh. No drainage to incision, and its healing well. Pt continues to wear hinged brace on.   Will continue plan of care.

## 2018-11-12 NOTE — PLAN OF CARE
Problem: Goal Outcome Summary  Goal: Goal Outcome Summary  PT: educated patient on ortho recommendation for hinged knee brace 0-40 degrees; able to unlock brace in this range. Printed out ortho instructions (from Epic) and placed on patient's board for reference. Patient reports improved comfort once brace was unlocked. Patient able to maintain TTWB without cueing from PT. Patient improving with slide board transfers; min assist x 1, however continued cueing and increased time for completion.    Patient reports now wanting to trial the short Camboot- initially refused by MD and orthotist. Provider notified regarding patient's request.    Continue with POC focusing on bed mobility and transfers for safe discharge home.

## 2018-11-12 NOTE — UTILIZATION REVIEW
Pain Interview    1. Have you had pain or hurting at any time in the last 5 days?  Yes  2. How much of the time have you experienced pain or hurting over the last 5 days? Frequently  3. Over the past 5 days, has pain made it hard for you to sleep at night?  Yes  4. Over the past 5 days, have you limited your day-to-day activities because of pain?Yes  5. Rate pain intensity: Numeric 8

## 2018-11-12 NOTE — PLAN OF CARE
Problem: Goal Outcome Summary  Goal: Goal Outcome Summary  Outcome: No Change  Alert and oriented x4. Able to communicate needs. Tylenol 650 mg given for pain. Uses a bedpan in bed. Urine sample collected and sent to the lab, awaiting results. LBM today.

## 2018-11-12 NOTE — PLAN OF CARE
Problem: Goal Outcome Summary  Goal: Goal Outcome Summary  OT: Pt. Reports spouse will not be able to assist at home due to issues with arm. Pt.reports is nervous about what plan will be for discharge. Pt. Reports children said they would give her money for PCA for home. Will notify social work to meet with patient

## 2018-11-12 NOTE — PROGRESS NOTES
"CLINICAL NUTRITION SERVICES - ASSESSMENT NOTE     Nutrition Prescription    RECOMMENDATIONS FOR MDs/PROVIDERS TO ORDER:  None today    Malnutrition Status:    Patient does not meet two of the above criteria necessary for diagnosing malnutrition    Recommendations already ordered by Registered Dietitian (RD):  - Boost Plus every other day at AM snack (strawberry)  - Gelatein PRN (cherry)    Future/Additional Recommendations:  - If protein intakes remain sub par, consider scheduling Gelatein.  - Pt may benefit from protein handout for home use closer to discharge.     REASON FOR ASSESSMENT  Marylee Woods is a/an 63 year old female assessed by the dietitian for MDS/ELENA    NUTRITION HISTORY  - Pt reports she has been trying to follow a high protein diet but some days are better than others. She has Ensure at home but does not drink these on a regular basis. She follows a regular diet despite her dtrs following a vegan diet.     CURRENT NUTRITION ORDERS  Diet: Regular  Intake/Tolerance: % of meals per flowsheet. On average, pt is ordering 1550 kcal and 44 g protein daily per [employer]. This is likely meeting 100% minimum energy and 75% protein needs.    LABS  Labs reviewed    MEDICATIONS  Medications reviewed  - vitamin D3  - theravit-m    ANTHROPOMETRICS  Ht Readings from Last 1 Encounters:   11/06/18 1.746 m (5' 8.75\")   Most Recent Weight: 57.8 kg (127 lb 8 oz)  IBW: 65.3 kg (89% IBW)  BMI: Normal BMI  Weight History: pt has gained 2.5 lbs over the last ~week.   Wt Readings from Last 10 Encounters:   11/08/18 57.8 kg (127 lb 8 oz)   10/31/18 56.7 kg (125 lb)   12/12/17 59 kg (130 lb)     Dosing Weight: 58 kg - most recent wt    ASSESSED NUTRITION NEEDS  Estimated Energy Needs: 8491-5489 kcals/day (25 - 30 kcals/kg)  Justification: Maintenance  Estimated Protein Needs: 58-70 grams protein/day (1.0 - 1.2 grams of pro/kg)  Justification: Post-op  Estimated Fluid Needs: 1 mL/kcal  Justification: Per provider " pending fluid status    PHYSICAL FINDINGS  See malnutrition section below.    MALNUTRITION  % Intake: Decreased intake does not meet criteria  % Weight Loss: None noted  Subcutaneous Fat Loss: Facial region: mild  Muscle Loss: generalized mild vs sarcopenia 2/2 MS  Fluid Accumulation/Edema: None noted  Malnutrition Diagnosis: Patient does not meet two of the above criteria necessary for diagnosing malnutrition    NUTRITION DIAGNOSIS  Predicted inadequate protein intake related to increased needs 2/2 post op as evidenced by pt meeting ~75% protein needs.       INTERVENTIONS  Implementation  Nutrition Education: discussed following a high protein diet during this admission and pt reports trying to order eggs more frequently. She was able to list several high protein foods but appears pt struggles with this more so at home. Would appreciate high protein handout closer to discharge.    Medical food supplement therapy     Goals  Patient to consume % of nutritionally adequate meal trays TID, or the equivalent with supplements/snacks.     Monitoring/Evaluation  Progress toward goals will be monitored and evaluated per protocol.      Holli Levy RD, LD  Unit Pager: [phone]

## 2018-11-13 LAB
BACTERIA SPEC CULT: NORMAL
Lab: NORMAL
SPECIMEN SOURCE: NORMAL

## 2018-11-13 PROCEDURE — 97530 THERAPEUTIC ACTIVITIES: CPT | Mod: GP | Performed by: PHYSICAL THERAPIST

## 2018-11-13 PROCEDURE — A9270 NON-COVERED ITEM OR SERVICE: HCPCS | Mod: GY | Performed by: INTERNAL MEDICINE

## 2018-11-13 PROCEDURE — 97110 THERAPEUTIC EXERCISES: CPT | Mod: GP | Performed by: PHYSICAL THERAPIST

## 2018-11-13 PROCEDURE — 40000133 ZZH STATISTIC OT WARD VISIT: Performed by: OCCUPATIONAL THERAPIST

## 2018-11-13 PROCEDURE — 97530 THERAPEUTIC ACTIVITIES: CPT | Mod: GO | Performed by: OCCUPATIONAL THERAPIST

## 2018-11-13 PROCEDURE — 97110 THERAPEUTIC EXERCISES: CPT | Mod: GO | Performed by: OCCUPATIONAL THERAPIST

## 2018-11-13 PROCEDURE — 25000132 ZZH RX MED GY IP 250 OP 250 PS 637: Mod: GY | Performed by: INTERNAL MEDICINE

## 2018-11-13 PROCEDURE — 25000128 H RX IP 250 OP 636: Performed by: INTERNAL MEDICINE

## 2018-11-13 PROCEDURE — L4360 PNEUMAT WALKING BOOT PRE CST: HCPCS

## 2018-11-13 PROCEDURE — 12000022 ZZH R&B SNF

## 2018-11-13 PROCEDURE — 00220000 ZZH SNF RUG CODE OPNP

## 2018-11-13 PROCEDURE — 97535 SELF CARE MNGMENT TRAINING: CPT | Mod: GO | Performed by: OCCUPATIONAL THERAPIST

## 2018-11-13 PROCEDURE — 40000193 ZZH STATISTIC PT WARD VISIT: Performed by: PHYSICAL THERAPIST

## 2018-11-13 RX ADMIN — HYDROXYZINE HYDROCHLORIDE 25 MG: 25 TABLET ORAL at 21:24

## 2018-11-13 RX ADMIN — BACLOFEN 20 MG: 20 TABLET ORAL at 03:51

## 2018-11-13 RX ADMIN — GABAPENTIN 900 MG: 300 CAPSULE ORAL at 21:24

## 2018-11-13 RX ADMIN — SERTRALINE HYDROCHLORIDE 150 MG: 50 TABLET ORAL at 20:23

## 2018-11-13 RX ADMIN — Medication 35 MG: at 20:23

## 2018-11-13 RX ADMIN — NITROFURANTOIN (MONOHYDRATE/MACROCRYSTALS) 100 MG: 75; 25 CAPSULE ORAL at 09:40

## 2018-11-13 RX ADMIN — MULTIPLE VITAMINS W/ MINERALS TAB 1 TABLET: TAB at 09:30

## 2018-11-13 RX ADMIN — ACETAMINOPHEN 650 MG: 325 TABLET, FILM COATED ORAL at 18:11

## 2018-11-13 RX ADMIN — OXYBUTYNIN CHLORIDE 10 MG: 10 TABLET, FILM COATED, EXTENDED RELEASE ORAL at 20:23

## 2018-11-13 RX ADMIN — GABAPENTIN 600 MG: 300 CAPSULE ORAL at 09:29

## 2018-11-13 RX ADMIN — OXYBUTYNIN CHLORIDE 10 MG: 10 TABLET, FILM COATED, EXTENDED RELEASE ORAL at 09:31

## 2018-11-13 RX ADMIN — ENOXAPARIN SODIUM 40 MG: 40 INJECTION SUBCUTANEOUS at 21:24

## 2018-11-13 RX ADMIN — ACETAMINOPHEN 650 MG: 325 TABLET, FILM COATED ORAL at 09:40

## 2018-11-13 RX ADMIN — OMEPRAZOLE 40 MG: 20 CAPSULE, DELAYED RELEASE ORAL at 09:30

## 2018-11-13 RX ADMIN — Medication 3 MG: at 21:23

## 2018-11-13 RX ADMIN — CHOLECALCIFEROL CAP 125 MCG (5000 UNIT) 5000 UNITS: 125 CAP at 09:31

## 2018-11-13 RX ADMIN — Medication 25 MG: at 09:30

## 2018-11-13 RX ADMIN — VALACYCLOVIR HYDROCHLORIDE 500 MG: 500 TABLET, FILM COATED ORAL at 20:23

## 2018-11-13 RX ADMIN — VALACYCLOVIR HYDROCHLORIDE 500 MG: 500 TABLET, FILM COATED ORAL at 09:31

## 2018-11-13 NOTE — PLAN OF CARE
Problem: Goal Outcome Summary  Goal: Goal Outcome Summary  OT: Pt. Continues to reports increased anxiety and difficulty focusing due to concerned with discharge plan and ability to care for self and ability of spouse to be able to assist her following discharge.  Continued to work on transfer training, strengthening.  Plan to set up Care conference and LSW will meet with patient 11/14.

## 2018-11-13 NOTE — PROGRESS NOTES
11/13/18, 2PM, [facility] TR Transitional Care Room R404-01, Female, 68 , 57.8 Kg, Dr. Basil Bacon MD, Dx: Fracture of femur, distal, left closed (S72.402A); MS (G35).    S:   Patient was seen today at [facility] TR Transitional Care Room R404-01 for the evaluation/fit/delivery of left CAM Boot (Maxtrax Air Ankle Walker, Medium Ref #[id number]). Patient appears pleasant.   - Health History & Progression: Patient was admitted into the hospital with a left femoral Fx and requires a CAM boot for ankle control for ambulation. Patient requires a CAM boot for rehabilitation, PT and home activities upon discharge.  - Patient was utilizing a Breg ROM Tscope knee immobilizer on her left leg upon my arrival.    O:   - Sensation: Patient vocalized uncompromised sensation in left  - Appearance of limb & vasculature: Patient s left leg was bandaged and wearing the non-stick socks.  - Patient was not allowed for weight bearing when I arrived in the patient room and was in bed restrictions until further plans developed.    A:   - Patient can benefit from the CAM Boot because it will provide support of the foot, ankle, and tibia through pneumatic bladder total contact and control of ankle movement/tibial progression during gait. Patient s ailment recovery is expected to progress well with the utilization of the CAM Boot. Upon fitting the brace the patient vocalized satisfaction with the fit and feel of the orthosis. The trimlines and circumferences of the brace presented satisfactory around patient s foot, ankle and tibia. Verbal and written instructions were given to the patient on how to don/doff/care for the brace. Patient signed the delivery ticket and was given the [employer] receipt information sheet.  - Goal: A pneumatic walking boot is functionally and medically necessary for reduction of pain in foot. A pneumatic walker is used for variable volume total contact to reduce motion at the ankle, hind foot, mid foot and forefoot.    P:  -  Patient was fit and delivered for the CAM Boot. Patient will utilize the orthosis for the duration of rehabilitation/PT in the hospital and at home activities upon discharge for the duration of treatment of patient ailment or until use of orthosis is no longer necessary. Will follow-up PRN.    Electronically signed by ANABELLA Lopez, MSPO, Board Eligible Prosthetist.

## 2018-11-13 NOTE — PROGRESS NOTES
Pt alert and oriented, appears anxious at times, at start of shift spent time talking about how she is particular with how she likes things done and set up. She wanted dressing change done at 1700 and requested a second person to hold leg during wrapping to help with pain. Incisions approximated to left leg. No drainage. Edema present. New primapore placed as ordered, ace wrap and brace. Leg has been elevated on pillow, both heels floated. When coming back to room after therapy, pt reports she will need bedpan and has been having diarrhea since receiving laxatives and stool softners. Small amount of incontinent BM. Voided and formed BM in bedpan. At bedtime had 1 incontinent bladder episode. Assist of 1, uses slide board. Spouse brought food for patient. Pain relieved with PRN tylenol and scheduled baclofen.

## 2018-11-13 NOTE — PROGRESS NOTES
Patient has PRN baclofen 20mg today at 0300. Patient is now requesting additional dose of 20mg at this time. Writer paged MD Bacon for additional one time order now if MD agrees. Pending call back for orders at this time.       Addendum 1735: No call back from MD bacon yet. RN staff to continue to offer other PRN medications and if requesting baclofen, may give HS baclofen a little earlier tonight until MD can follow up in AM. Will continue to observe.

## 2018-11-13 NOTE — PLAN OF CARE
Problem: Goal Outcome Summary  Goal: Goal Outcome Summary  PT: patient wearing CAM boot; kept on for transfers and taken off during exercises. When performing exercises, patient's LLE began to spasm multiple times. Spasms caused pain to the patient, making it difficult for her to complete exercises and transfers towards the end of the session.

## 2018-11-13 NOTE — PLAN OF CARE
Problem: Goal Outcome Summary  Goal: Goal Outcome Summary  Outcome: No Change  No new issues or concerns overnight. Awake and watching TV during 0300 rounds. Stated she's been sleeping off and on. Denied pain, discomfort, CP and SOB. LLE elevated on pillow and hinged knee brace intact. Pt.stated she shifts her weight around indep.- refused assistance with repositioning. Has urine culture results pending.    0410 - Requested and rec'd Baclofen 20mg po @ 0350 for c/o LLE muscle spasms.

## 2018-11-13 NOTE — PLAN OF CARE
Problem: Goal Outcome Summary  Goal: Goal Outcome Summary  Outcome: No Change  Patient is alert x 4 and she directs her care and she uses her call light appropriately.  VSS.   Patient is incontinent of urine and wears disposable briefs. jana area pinkish red, barrier cream applied. No signs of any breakdown.  Patient requested Tylenol this am for back pain and left knee. Brace on left leg with ace wrap underneath. Brace not removed to inspect skin.

## 2018-11-14 PROCEDURE — 40000193 ZZH STATISTIC PT WARD VISIT: Performed by: PHYSICAL THERAPIST

## 2018-11-14 PROCEDURE — 25000132 ZZH RX MED GY IP 250 OP 250 PS 637: Mod: GY | Performed by: INTERNAL MEDICINE

## 2018-11-14 PROCEDURE — A9270 NON-COVERED ITEM OR SERVICE: HCPCS | Mod: GY | Performed by: INTERNAL MEDICINE

## 2018-11-14 PROCEDURE — 40000133 ZZH STATISTIC OT WARD VISIT: Performed by: OCCUPATIONAL THERAPIST

## 2018-11-14 PROCEDURE — 97535 SELF CARE MNGMENT TRAINING: CPT | Mod: GO | Performed by: OCCUPATIONAL THERAPIST

## 2018-11-14 PROCEDURE — 97530 THERAPEUTIC ACTIVITIES: CPT | Mod: GO | Performed by: OCCUPATIONAL THERAPIST

## 2018-11-14 PROCEDURE — 25000128 H RX IP 250 OP 636: Performed by: INTERNAL MEDICINE

## 2018-11-14 PROCEDURE — 97530 THERAPEUTIC ACTIVITIES: CPT | Mod: GP | Performed by: PHYSICAL THERAPIST

## 2018-11-14 PROCEDURE — 99309 SBSQ NF CARE MODERATE MDM 30: CPT | Performed by: INTERNAL MEDICINE

## 2018-11-14 PROCEDURE — 12000022 ZZH R&B SNF

## 2018-11-14 PROCEDURE — 97110 THERAPEUTIC EXERCISES: CPT | Mod: GP | Performed by: PHYSICAL THERAPIST

## 2018-11-14 PROCEDURE — 99207 ZZC CDG-MDM COMPONENT: MEETS MODERATE - UP CODED: CPT | Performed by: INTERNAL MEDICINE

## 2018-11-14 RX ORDER — METHOCARBAMOL 500 MG/1
500 TABLET ORAL 3 TIMES DAILY PRN
Status: DISCONTINUED | OUTPATIENT
Start: 2018-11-14 | End: 2018-11-21 | Stop reason: HOSPADM

## 2018-11-14 RX ORDER — SULFAMETHOXAZOLE/TRIMETHOPRIM 800-160 MG
1 TABLET ORAL DAILY
Status: DISCONTINUED | OUTPATIENT
Start: 2018-11-14 | End: 2018-11-15

## 2018-11-14 RX ORDER — FERROUS SULFATE 325(65) MG
325 TABLET ORAL DAILY
Status: DISCONTINUED | OUTPATIENT
Start: 2018-11-15 | End: 2018-11-21 | Stop reason: HOSPADM

## 2018-11-14 RX ORDER — NITROFURANTOIN 25; 75 MG/1; MG/1
100 CAPSULE ORAL
Status: DISCONTINUED | OUTPATIENT
Start: 2018-11-14 | End: 2018-11-15

## 2018-11-14 RX ADMIN — MULTIPLE VITAMINS W/ MINERALS TAB 1 TABLET: TAB at 09:04

## 2018-11-14 RX ADMIN — Medication 35 MG: at 21:10

## 2018-11-14 RX ADMIN — HYDROXYZINE HYDROCHLORIDE 25 MG: 25 TABLET ORAL at 21:24

## 2018-11-14 RX ADMIN — Medication 25 MG: at 08:59

## 2018-11-14 RX ADMIN — OXYBUTYNIN CHLORIDE 10 MG: 10 TABLET, FILM COATED, EXTENDED RELEASE ORAL at 08:59

## 2018-11-14 RX ADMIN — GABAPENTIN 600 MG: 300 CAPSULE ORAL at 08:59

## 2018-11-14 RX ADMIN — VALACYCLOVIR HYDROCHLORIDE 500 MG: 500 TABLET, FILM COATED ORAL at 08:59

## 2018-11-14 RX ADMIN — NITROFURANTOIN (MONOHYDRATE/MACROCRYSTALS) 100 MG: 75; 25 CAPSULE ORAL at 08:59

## 2018-11-14 RX ADMIN — OMEPRAZOLE 40 MG: 20 CAPSULE, DELAYED RELEASE ORAL at 08:58

## 2018-11-14 RX ADMIN — CHOLECALCIFEROL CAP 125 MCG (5000 UNIT) 5000 UNITS: 125 CAP at 08:59

## 2018-11-14 RX ADMIN — BACLOFEN 20 MG: 20 TABLET ORAL at 18:21

## 2018-11-14 RX ADMIN — OXYBUTYNIN CHLORIDE 10 MG: 10 TABLET, FILM COATED, EXTENDED RELEASE ORAL at 19:11

## 2018-11-14 RX ADMIN — ENOXAPARIN SODIUM 40 MG: 40 INJECTION SUBCUTANEOUS at 21:09

## 2018-11-14 RX ADMIN — SULFAMETHOXAZOLE AND TRIMETHOPRIM 1 TABLET: 800; 160 TABLET ORAL at 14:26

## 2018-11-14 RX ADMIN — VALACYCLOVIR HYDROCHLORIDE 500 MG: 500 TABLET, FILM COATED ORAL at 21:09

## 2018-11-14 RX ADMIN — SERTRALINE HYDROCHLORIDE 150 MG: 50 TABLET ORAL at 21:09

## 2018-11-14 RX ADMIN — ACETAMINOPHEN 650 MG: 325 TABLET, FILM COATED ORAL at 09:04

## 2018-11-14 RX ADMIN — GABAPENTIN 900 MG: 300 CAPSULE ORAL at 21:09

## 2018-11-14 RX ADMIN — CALCIUM CARBONATE (ANTACID) CHEW TAB 500 MG 1000 MG: 500 CHEW TAB at 08:58

## 2018-11-14 NOTE — PROGRESS NOTES
TCU Care Coordinator Progress Note    Admission date: 11/6/2018    Data: Marylee Woods is a 62 yo female on TCU for rehab following hospitalization for L femur with ORIF and L ankle fracture.    Intervention: Met with patient to introduce the role of Care Coordinator and to begin discussion of anticipated discharge planning needs. Initiated referral with  Home Care for SN/PT/OT, per patient request.    Assessment: Care Conference tomorrow. If she goes home, will have above home care needs. She can learn Lovenox injections, has given self another subcutaneous med in the past. Incisional cares to LLE can be taught and managed at home.    Plan: Will continue to follow discharge planning needs throughout TCU stay. Potential discharge in about a week.    Shay Vazquez RN, BSN, Patient Care Management Coordinator  [facility]  [address], [address] [place] [address]  [email or web address]  [email or web address]   Desk: [phone] TCU Main [phone] Fax [phone] Pager [phone]

## 2018-11-14 NOTE — PROGRESS NOTES
"Patient seen and examined    S- c/o spasms -increased at night with pain.  No other complaints     4 point ROS done and neg unless mentioned     O-   /60 (BP Location: Left arm)  Pulse 78  Temp 98.2  F (36.8  C) (Oral)  Resp 12  Ht 1.746 m (5' 8.75\")  Wt 57.8 kg (127 lb 8 oz)  LMP  (LMP Unknown)  SpO2 97%  BMI 18.97 kg/m2   Gen- pleasant   HEENT- NAD, LASHON  Neck- supple  CVS- I+II+ no m/r/g  RS- CTAB  Abdo- soft, no tenderness . No g/r/r   Ext- trace edema RLE  MSk - LLE in dsg and support    LABS:   BMP  Recent Labs  Lab 11/12/18  0622      POTASSIUM 4.4   CHLORIDE 111*   JORDON 9.0   CO2 27   BUN 20   CR 0.85   *     CBC  Recent Labs  Lab 11/12/18  0622 11/09/18  0538   WBC 3.2*  --    RBC 2.48*  --    HGB 7.4*  --    HCT 23.3*  --    MCV 94  --    MCH 29.8  --    MCHC 31.8  --    RDW 13.4  --     162     A/P:  Admitted to TCU 11/6/2018 for rehab needs.      # Left distal femur fracture 10/31.  S/P ORIF 11.01.   # L ankle fracture on Xray, non displaced. Reviewed by Ortho.      L LE pain, muscle spasms most helped by prn + scheduled baclofen (25mg, 30 mg), ordered 20 mg daily prn for muscle spasms .   Oxycodone prn- minimize/avoid as able.   Acetaminophen prn.   Gabapentin 600, 900 mg  Hydroxyzine prn - adjuvant pain, anxiety, sleep.   11/14- add prn Robaxin       PT/OT consult. Therapy as tolerated  -WB: TTWB LLE, hinged knee brace 0-40.  - Orthotics consult requested for short CAM boot to see if brace and boot can work together. Boot recommended for any WB activity.    DVT PPx: Per Ortho: Lovenox SQ total 4 weeks.      #  Advanced multiple sclerosis  diagnosed 1993, manifested again predominantly by left-sided weakness w Urinary incontinence ? Neurogenic bladder. Last treated with Lemtrada 5-day infusion last year.   Stable at current. Follows Neurology as outpatient.   - Continue Valacyclovir 500 bid ppx, Oxybutynin.   - Outpatient follow-up with Neurology.       * 11.10: Patient " requested for her monthly labs per her Neurology, while she is here, including cbc w diff, bmp, UA, TSH, Ft4, CD4. (absolute CD4-76!! - will start Bactrim ppx)      # History of pancytopenia attributed to prior treatment for multiple sclerosis. Mild thrombocytopenia, stable  - follow-up CBC weekly  - transfuse for Hgb<7.0. Will start Ferrous sulphate -11/14      # Essential hypertension, remains stable off PTA hydrochlorothiazide  - Ct to monitor.   - Ct to hold hydrochlorothiazide      # History of recurrent urinary tract infection.  On Nitrofurantoin for suppression.  11.01 recent Urine culture: no growth.    - Continue home Nitrofurantoin.     # Acute blood loss superimposed on normochromic normocytic anemia, stable      # Constipation, chronic 2nd to MS, aggravated by opiates, improved, with BM yesterday:   - Ct bowel regimen.     # Depression, anxiety: mood stable.   - Continue home sertraline 150 mg daily.     Basil Bacon MD (Pager- [phone])   Internal Medicine/ Hospitalist      Addendum: 11/15- D/w patient's Neurology clinic. Aware of low CD4 count due to her medicine for MS. They recommend only valcyclovir but no need for any other medicine for prophylaxis

## 2018-11-14 NOTE — PLAN OF CARE
Problem: Goal Outcome Summary  Goal: Goal Outcome Summary  Pt is alert and oriented x4, VSS, no complaints of SOB. Pt asked this nurse to contact the doctor to see if they would be willing to add in another PRN dose of Baclofen as she had already used her daily PRN dose at approx 0400 today. Pt stated she was having a lot of spasm in her left leg. Doctor was paged and no new order was placed. Pt offered tylenol, atarax and oxy for pain and spasms. Pt requested and given prn tylenol. Pt incont of urine this shift, calling to be changed. Frida area is reddened, barrier cream applied. Pt also given PRN atarax and melatonin at HS. Dressings on left leg CDI, changed yesterday and orders are for every other day.

## 2018-11-14 NOTE — PLAN OF CARE
Problem: Goal Outcome Summary  Goal: Goal Outcome Summary  OT: Pt. C/o increased pain in ankle today due to positioning with CAM boot and boot being heavy and unable to keep aligned with it being internally rotated while lying  Supine in bed.   Mod assist with slide board transfer and bed mobility today. Pt. Able to complete grooming/hygiene and oral cares Independently seated at sink in wheelchair.

## 2018-11-14 NOTE — PLAN OF CARE
Problem: Goal Outcome Summary  Goal: Goal Outcome Summary  Outcome: No Change  Alert and oriented x 4. Able to communicate needs. Denies SOB. Had pain, but declined oxycodone. Tylenol was administered with encouragement. Left leg dressing, ace wrap and hinge brace intact and in place. Remains toe touch wt bearing on left leg. Appetite fair. Declined Miralax. Assist of one for transfer. Continue to monitor.

## 2018-11-14 NOTE — PLAN OF CARE
Problem: Goal Outcome Summary  Goal: Goal Outcome Summary  PT: Charity for slideboard transfers and bed mobility. Patient expressed mild concern about the potential for going home too early. Explained purpose of care conference with patient. Noted increased pain in L ankle and discomfort with the CAM boot as it tends to internally rotate foot in bed -- placed pillows to help assist with keeping L foot straight when supine.

## 2018-11-14 NOTE — PROGRESS NOTES
Care conference has been scheduled per the pt's  schedule. It has been scheduled for tomorrow 11/15 at 1:00 pm. Provider and rehab scheduling aware. SW will continue to follow and assist as needed.    STEPHANY Beltran  [facility]   P: [phone]  Pgr: [phone]

## 2018-11-14 NOTE — PLAN OF CARE
Problem: Goal Outcome Summary  Goal: Goal Outcome Summary  Outcome: No Change  Pt.slept very well tonight. Incontinent of urine x1. LLE hinged knee brace intact - keeps elevated on pillows. Has no c/o's pain, discomfort, CP and SOB and no c/o's LLE spasms as of yet tonight.

## 2018-11-15 LAB — PLATELET # BLD AUTO: 233 10E9/L (ref 150–450)

## 2018-11-15 PROCEDURE — 40000133 ZZH STATISTIC OT WARD VISIT: Performed by: OCCUPATIONAL THERAPIST

## 2018-11-15 PROCEDURE — A9270 NON-COVERED ITEM OR SERVICE: HCPCS | Mod: GY | Performed by: INTERNAL MEDICINE

## 2018-11-15 PROCEDURE — 99366 TEAM CONF W/PAT BY HC PROF: CPT | Performed by: OCCUPATIONAL THERAPIST

## 2018-11-15 PROCEDURE — 97535 SELF CARE MNGMENT TRAINING: CPT | Mod: GO | Performed by: OCCUPATIONAL THERAPIST

## 2018-11-15 PROCEDURE — 85049 AUTOMATED PLATELET COUNT: CPT | Performed by: INTERNAL MEDICINE

## 2018-11-15 PROCEDURE — 25000132 ZZH RX MED GY IP 250 OP 250 PS 637: Mod: GY | Performed by: INTERNAL MEDICINE

## 2018-11-15 PROCEDURE — 97530 THERAPEUTIC ACTIVITIES: CPT | Mod: GP | Performed by: PHYSICAL THERAPIST

## 2018-11-15 PROCEDURE — 99366 TEAM CONF W/PAT BY HC PROF: CPT | Performed by: PHYSICAL THERAPIST

## 2018-11-15 PROCEDURE — 25000128 H RX IP 250 OP 636: Performed by: INTERNAL MEDICINE

## 2018-11-15 PROCEDURE — 12000022 ZZH R&B SNF

## 2018-11-15 PROCEDURE — 36415 COLL VENOUS BLD VENIPUNCTURE: CPT | Performed by: INTERNAL MEDICINE

## 2018-11-15 PROCEDURE — 40000193 ZZH STATISTIC PT WARD VISIT: Performed by: PHYSICAL THERAPIST

## 2018-11-15 RX ORDER — NITROFURANTOIN 25; 75 MG/1; MG/1
100 CAPSULE ORAL DAILY
Status: DISCONTINUED | OUTPATIENT
Start: 2018-11-15 | End: 2018-11-21 | Stop reason: HOSPADM

## 2018-11-15 RX ADMIN — SULFAMETHOXAZOLE AND TRIMETHOPRIM 1 TABLET: 800; 160 TABLET ORAL at 09:00

## 2018-11-15 RX ADMIN — VALACYCLOVIR HYDROCHLORIDE 500 MG: 500 TABLET, FILM COATED ORAL at 21:32

## 2018-11-15 RX ADMIN — OMEPRAZOLE 40 MG: 20 CAPSULE, DELAYED RELEASE ORAL at 09:00

## 2018-11-15 RX ADMIN — Medication 325 MG: at 09:00

## 2018-11-15 RX ADMIN — Medication 35 MG: at 21:32

## 2018-11-15 RX ADMIN — VALACYCLOVIR HYDROCHLORIDE 500 MG: 500 TABLET, FILM COATED ORAL at 09:00

## 2018-11-15 RX ADMIN — OXYBUTYNIN CHLORIDE 10 MG: 10 TABLET, FILM COATED, EXTENDED RELEASE ORAL at 09:00

## 2018-11-15 RX ADMIN — Medication 500 MG: at 01:02

## 2018-11-15 RX ADMIN — GABAPENTIN 900 MG: 300 CAPSULE ORAL at 21:32

## 2018-11-15 RX ADMIN — GABAPENTIN 600 MG: 300 CAPSULE ORAL at 09:00

## 2018-11-15 RX ADMIN — Medication 25 MG: at 08:59

## 2018-11-15 RX ADMIN — ACETAMINOPHEN 650 MG: 325 TABLET, FILM COATED ORAL at 09:00

## 2018-11-15 RX ADMIN — HYDROXYZINE HYDROCHLORIDE 25 MG: 25 TABLET ORAL at 21:42

## 2018-11-15 RX ADMIN — ENOXAPARIN SODIUM 40 MG: 40 INJECTION SUBCUTANEOUS at 21:31

## 2018-11-15 RX ADMIN — CHOLECALCIFEROL CAP 125 MCG (5000 UNIT) 5000 UNITS: 125 CAP at 09:00

## 2018-11-15 RX ADMIN — OXYBUTYNIN CHLORIDE 10 MG: 10 TABLET, FILM COATED, EXTENDED RELEASE ORAL at 21:37

## 2018-11-15 RX ADMIN — BACLOFEN 20 MG: 20 TABLET ORAL at 18:50

## 2018-11-15 RX ADMIN — SERTRALINE HYDROCHLORIDE 150 MG: 50 TABLET ORAL at 21:32

## 2018-11-15 RX ADMIN — NITROFURANTOIN (MONOHYDRATE/MACROCRYSTALS) 100 MG: 75; 25 CAPSULE ORAL at 16:15

## 2018-11-15 RX ADMIN — ACETAMINOPHEN 650 MG: 325 TABLET, FILM COATED ORAL at 21:51

## 2018-11-15 RX ADMIN — MULTIPLE VITAMINS W/ MINERALS TAB 1 TABLET: TAB at 09:00

## 2018-11-15 NOTE — PROGRESS NOTES
Social Work Services Progress Note    Hospital Day: 10    Intervention:  Care conference held.     Assessment:      Care conference was held today with the pt, pt's  Bere Jackson and Constanza PT, OT Dr. Arleen Arias provider and Coretta ANDERSON.     Medically the pt has been doing well, there were no questions or concerns at this time. Pt will discharge on Lovenox injections which was discussed with Shay SINGH earlier on. Pt will have x-rays done on Monday and maybe learn some new information on the pt's healing.     Please see updates from PT/OT on the progress of the pt. Pt's  will be coming in on Saturday to do some family training between 1-3 pm.     After much discussion the pt and her  Manuel are comfortable taking the pt home next week 11/21 Wednesday. Pt will discharge with  Home Care PT/OT/SN/HHA. Pt will be making her ride with [employer] on Saturday so she can get home on Wednesday.     Pt had no other questions or concerns and is AGREEABLE with her discharge plan of Wednesday 11/21/2018.     Plan:    Anticipated Disposition:  Home with services    Barriers to d/c plan:  Remain  Medically stable.     Follow Up:  JUSTIN will continue to follow and assist as needed.    STEPHANY Beltran  BISI   P: [phone]  Pgr: [phone]

## 2018-11-15 NOTE — PLAN OF CARE
Problem: Goal Outcome Summary  Goal: Goal Outcome Summary  Outcome: No Change  Alert, uses call light appropriately to make needs known. Turned and repositioned with assist of one-two staff. Incontinent of bladder x1, blanchable erythema continues to perirectal area, barrier applied. Left L/E hinged brace in place. Robaxin PRN given x1 for muscle spasms with relief. Denies SOB or chest pain.

## 2018-11-15 NOTE — PLAN OF CARE
Problem: Goal Outcome Summary  Goal: Goal Outcome Summary  Outcome: No Change  Pt alert and oriented x 4. Able to communicate needs. Denies SOB. C/O left foot and leg pain, but declined oxycodone and instead took Tylenol. Hinge brace 0-45 degree on. Placed pillow to keep left foot straight while in bed. Pt is using slide board and assist of one for transfer to chair. Toe touch stefano bearing on left leg maintained. Appetite fair. Decline Marilax. Continue to monitor.

## 2018-11-15 NOTE — PLAN OF CARE
Problem: Goal Outcome Summary  Goal: Goal Outcome Summary  Medical Team Conference with pt and spouse present. Reported on pt's current status--doing well with slideboard transfers only needing help to place board and reposition L LE from sticking on floor--pt assists with this. Explained pt will need help to raise LE onto bed. Spouse did not feel he would have any difficulty with this--states plans to hold off on any rotator cuff surgery until after pt is healed. OT had input regarding pt's increased A for toileting and dressing. Plan to see pt until 11/20 with plan to discharge home with home PT and A of spouse. Please see  note for full details.

## 2018-11-15 NOTE — PLAN OF CARE
Problem: Goal Outcome Summary  Goal: Goal Outcome Summary  Outcome: No Change  Patient alert and oriented, by her bedside.Left leg brace removed for skin care,wound/incision is healed,no drainage,dressing removed & kerlix applied plus ace wrap.Boot on left foot and hinged brace on the leg.Baclofen prn x 1 for left leg spasms,left knee tender on touch,both left knee and foot are edematous.Patient directs positioning of her left leg.

## 2018-11-15 NOTE — PLAN OF CARE
Problem: Goal Outcome Summary  Goal: Goal Outcome Summary  OT: Medical team conference held with patient and patient spouse, PT/OT, MD and LSW.  Plan for patient to work with OT with discharge date at this time 11/20. Family training planned for 11/17 with spouse.  OT educated patient and spouse on recommendations with safety for transfers and ADL tasks. Pt. And spouse receptive and working on plan to get patient home with assist.  Recommend HHA and OT for discharge.

## 2018-11-16 PROCEDURE — 97535 SELF CARE MNGMENT TRAINING: CPT | Mod: GO

## 2018-11-16 PROCEDURE — 40000133 ZZH STATISTIC OT WARD VISIT

## 2018-11-16 PROCEDURE — 25000128 H RX IP 250 OP 636: Performed by: INTERNAL MEDICINE

## 2018-11-16 PROCEDURE — A9270 NON-COVERED ITEM OR SERVICE: HCPCS | Mod: GY | Performed by: INTERNAL MEDICINE

## 2018-11-16 PROCEDURE — 40000193 ZZH STATISTIC PT WARD VISIT: Performed by: PHYSICAL THERAPIST

## 2018-11-16 PROCEDURE — 97110 THERAPEUTIC EXERCISES: CPT | Mod: GP | Performed by: PHYSICAL THERAPIST

## 2018-11-16 PROCEDURE — 25000132 ZZH RX MED GY IP 250 OP 250 PS 637: Mod: GY | Performed by: INTERNAL MEDICINE

## 2018-11-16 PROCEDURE — 12000022 ZZH R&B SNF

## 2018-11-16 PROCEDURE — 97530 THERAPEUTIC ACTIVITIES: CPT | Mod: GP | Performed by: PHYSICAL THERAPIST

## 2018-11-16 RX ADMIN — Medication 25 MG: at 08:47

## 2018-11-16 RX ADMIN — VALACYCLOVIR HYDROCHLORIDE 500 MG: 500 TABLET, FILM COATED ORAL at 21:22

## 2018-11-16 RX ADMIN — CALCIUM CARBONATE (ANTACID) CHEW TAB 500 MG 1000 MG: 500 CHEW TAB at 17:04

## 2018-11-16 RX ADMIN — OXYBUTYNIN CHLORIDE 10 MG: 10 TABLET, FILM COATED, EXTENDED RELEASE ORAL at 08:49

## 2018-11-16 RX ADMIN — ACETAMINOPHEN 650 MG: 325 TABLET, FILM COATED ORAL at 08:49

## 2018-11-16 RX ADMIN — ACETAMINOPHEN 650 MG: 325 TABLET, FILM COATED ORAL at 21:23

## 2018-11-16 RX ADMIN — NITROFURANTOIN (MONOHYDRATE/MACROCRYSTALS) 100 MG: 75; 25 CAPSULE ORAL at 08:47

## 2018-11-16 RX ADMIN — BACLOFEN 20 MG: 20 TABLET ORAL at 17:04

## 2018-11-16 RX ADMIN — GABAPENTIN 600 MG: 300 CAPSULE ORAL at 08:49

## 2018-11-16 RX ADMIN — HYDROXYZINE HYDROCHLORIDE 25 MG: 25 TABLET ORAL at 21:25

## 2018-11-16 RX ADMIN — GABAPENTIN 900 MG: 300 CAPSULE ORAL at 21:23

## 2018-11-16 RX ADMIN — SERTRALINE HYDROCHLORIDE 150 MG: 50 TABLET ORAL at 21:22

## 2018-11-16 RX ADMIN — ENOXAPARIN SODIUM 40 MG: 40 INJECTION SUBCUTANEOUS at 21:20

## 2018-11-16 RX ADMIN — OXYBUTYNIN CHLORIDE 10 MG: 10 TABLET, FILM COATED, EXTENDED RELEASE ORAL at 21:25

## 2018-11-16 RX ADMIN — CHOLECALCIFEROL CAP 125 MCG (5000 UNIT) 5000 UNITS: 125 CAP at 08:48

## 2018-11-16 RX ADMIN — MULTIPLE VITAMINS W/ MINERALS TAB 1 TABLET: TAB at 08:48

## 2018-11-16 RX ADMIN — VALACYCLOVIR HYDROCHLORIDE 500 MG: 500 TABLET, FILM COATED ORAL at 08:48

## 2018-11-16 RX ADMIN — ACETAMINOPHEN 650 MG: 325 TABLET, FILM COATED ORAL at 17:05

## 2018-11-16 RX ADMIN — Medication 325 MG: at 08:49

## 2018-11-16 RX ADMIN — Medication 35 MG: at 21:25

## 2018-11-16 RX ADMIN — OMEPRAZOLE 40 MG: 20 CAPSULE, DELAYED RELEASE ORAL at 08:48

## 2018-11-16 NOTE — PLAN OF CARE
Problem: Goal Outcome Summary  Goal: Goal Outcome Summary  Outcome: No Change  Alert and oriented x4. Reported no new issues overnight. No complaints of pain/SOB. Appears to be sleeping in between encounters. LLE ace wraps and hinged brace in place. Reported no problems with B/B, incontinent of bladder. Call light in reach. Continue with POC.

## 2018-11-16 NOTE — PLAN OF CARE
Problem: Goal Outcome Summary  Goal: Goal Outcome Summary  PT: family/caregiver training tomorrow (11/17/2018) at 1pm. Patient and  requesting help with transfers and home exercise programs.

## 2018-11-16 NOTE — PLAN OF CARE
"Problem: Goal Outcome Summary  Goal: Goal Outcome Summary  Outcome: No Change  RN: Pt AA&O x3, able to make needs known. Participated in therapies. Pt sleeping during rounds. Will request prn pain meds. Con't POC.     /64 (BP Location: Left arm)  Pulse 76  Temp 97.6  F (36.4  C) (Oral)  Resp 16  Ht 1.746 m (5' 8.75\")  Wt 57.8 kg (127 lb 8 oz)  LMP  (LMP Unknown)  SpO2 100%  BMI 18.97 kg/m2        "

## 2018-11-17 PROCEDURE — 97535 SELF CARE MNGMENT TRAINING: CPT | Mod: GO | Performed by: OCCUPATIONAL THERAPIST

## 2018-11-17 PROCEDURE — 97110 THERAPEUTIC EXERCISES: CPT | Mod: GP | Performed by: PHYSICAL THERAPIST

## 2018-11-17 PROCEDURE — 40000133 ZZH STATISTIC OT WARD VISIT: Performed by: OCCUPATIONAL THERAPIST

## 2018-11-17 PROCEDURE — A9270 NON-COVERED ITEM OR SERVICE: HCPCS | Mod: GY | Performed by: INTERNAL MEDICINE

## 2018-11-17 PROCEDURE — 25000128 H RX IP 250 OP 636: Performed by: INTERNAL MEDICINE

## 2018-11-17 PROCEDURE — 25000132 ZZH RX MED GY IP 250 OP 250 PS 637: Mod: GY | Performed by: INTERNAL MEDICINE

## 2018-11-17 PROCEDURE — 40000193 ZZH STATISTIC PT WARD VISIT: Performed by: PHYSICAL THERAPIST

## 2018-11-17 PROCEDURE — 12000022 ZZH R&B SNF

## 2018-11-17 PROCEDURE — 97530 THERAPEUTIC ACTIVITIES: CPT | Mod: GP | Performed by: PHYSICAL THERAPIST

## 2018-11-17 RX ADMIN — Medication 25 MG: at 09:03

## 2018-11-17 RX ADMIN — HYDROXYZINE HYDROCHLORIDE 25 MG: 25 TABLET ORAL at 20:47

## 2018-11-17 RX ADMIN — ENOXAPARIN SODIUM 40 MG: 40 INJECTION SUBCUTANEOUS at 20:42

## 2018-11-17 RX ADMIN — Medication 325 MG: at 09:05

## 2018-11-17 RX ADMIN — OXYBUTYNIN CHLORIDE 10 MG: 10 TABLET, FILM COATED, EXTENDED RELEASE ORAL at 20:43

## 2018-11-17 RX ADMIN — ACETAMINOPHEN 650 MG: 325 TABLET, FILM COATED ORAL at 09:17

## 2018-11-17 RX ADMIN — CALCIUM CARBONATE (ANTACID) CHEW TAB 500 MG 1000 MG: 500 CHEW TAB at 12:36

## 2018-11-17 RX ADMIN — Medication 35 MG: at 20:42

## 2018-11-17 RX ADMIN — VALACYCLOVIR HYDROCHLORIDE 500 MG: 500 TABLET, FILM COATED ORAL at 20:43

## 2018-11-17 RX ADMIN — GABAPENTIN 900 MG: 300 CAPSULE ORAL at 20:42

## 2018-11-17 RX ADMIN — GABAPENTIN 600 MG: 300 CAPSULE ORAL at 09:02

## 2018-11-17 RX ADMIN — Medication 500 MG: at 00:30

## 2018-11-17 RX ADMIN — CHOLECALCIFEROL CAP 125 MCG (5000 UNIT) 5000 UNITS: 125 CAP at 09:05

## 2018-11-17 RX ADMIN — SERTRALINE HYDROCHLORIDE 150 MG: 50 TABLET ORAL at 20:43

## 2018-11-17 RX ADMIN — NITROFURANTOIN (MONOHYDRATE/MACROCRYSTALS) 100 MG: 75; 25 CAPSULE ORAL at 09:05

## 2018-11-17 RX ADMIN — OMEPRAZOLE 40 MG: 20 CAPSULE, DELAYED RELEASE ORAL at 09:02

## 2018-11-17 RX ADMIN — OXYBUTYNIN CHLORIDE 10 MG: 10 TABLET, FILM COATED, EXTENDED RELEASE ORAL at 09:05

## 2018-11-17 RX ADMIN — BACLOFEN 20 MG: 20 TABLET ORAL at 12:36

## 2018-11-17 RX ADMIN — ACETAMINOPHEN 650 MG: 325 TABLET, FILM COATED ORAL at 20:47

## 2018-11-17 RX ADMIN — VALACYCLOVIR HYDROCHLORIDE 500 MG: 500 TABLET, FILM COATED ORAL at 09:03

## 2018-11-17 RX ADMIN — MULTIPLE VITAMINS W/ MINERALS TAB 1 TABLET: TAB at 09:05

## 2018-11-17 NOTE — PLAN OF CARE
Problem: Goal Outcome Summary  Goal: Goal Outcome Summary  Outcome: No Change  Pt.A&Ox4. Requested and rec'd Robaxin 500mg po @ 0030 for c/o RLE spasms. Pt.sleeping on reassessment. LLE with hinged knee brace intact and elevated on pillow.

## 2018-11-17 NOTE — PLAN OF CARE
"Problem: Goal Outcome Summary  Goal: Goal Outcome Summary  Outcome: No Change  RN: Pt AA&O x3, able to make needs known. Pt wearing brace to left leg; dsg changed, brace unhinged and foot boot added. Pt uses bedpan; small BM this shift. Con't POC.     /55 (BP Location: Left arm)  Pulse 71  Temp 98.7  F (37.1  C) (Oral)  Resp 16  Ht 1.746 m (5' 8.75\")  Wt 57.8 kg (127 lb 8 oz)  LMP  (LMP Unknown)  SpO2 97%  BMI 18.97 kg/m2    "

## 2018-11-17 NOTE — PLAN OF CARE
Problem: Goal Outcome Summary  Goal: Goal Outcome Summary  PT: patient and , Helder, present for family training. Educated on gait belt and slide board use for safety, knee brace alignment and adjustments, donning/doffing camboot and body mechanics for patient and  for safe transfer performance. Patient and  return demonstrated bed mobility and slide board transfers (incline/decline surfaces to simulate home environment), as well as, initiated HEP- would benefit from continued review with patient prior to discharge.    Recommending elevating leg rest on L for improved positioning; will attempt to contact wheelchair company ([facility] wheelchairs: phone number [phone] re: Melissa) for coverage possibilities

## 2018-11-17 NOTE — PLAN OF CARE
Problem: Goal Outcome Summary  Goal: Goal Outcome Summary  OTL Patient and  present for family training. Patient and family planning to target  work schedule around home for am routine , then again after lunch , and again around evening meal time. Patient and  plan to transfer to the bed for brief changes vs attempt to stand balance and be NWB with heavy L LE brace on. OT will continue to work on strategies for layered pull up or other reasonable garment choice for adequate  Protection, wearing and changing  Ease.

## 2018-11-18 PROCEDURE — 25000132 ZZH RX MED GY IP 250 OP 250 PS 637: Mod: GY | Performed by: INTERNAL MEDICINE

## 2018-11-18 PROCEDURE — 12000022 ZZH R&B SNF

## 2018-11-18 PROCEDURE — 99309 SBSQ NF CARE MODERATE MDM 30: CPT | Performed by: INTERNAL MEDICINE

## 2018-11-18 PROCEDURE — A9270 NON-COVERED ITEM OR SERVICE: HCPCS | Mod: GY | Performed by: INTERNAL MEDICINE

## 2018-11-18 PROCEDURE — 25000128 H RX IP 250 OP 636: Performed by: INTERNAL MEDICINE

## 2018-11-18 RX ADMIN — ACETAMINOPHEN 650 MG: 325 TABLET, FILM COATED ORAL at 20:30

## 2018-11-18 RX ADMIN — Medication 325 MG: at 08:11

## 2018-11-18 RX ADMIN — VALACYCLOVIR HYDROCHLORIDE 500 MG: 500 TABLET, FILM COATED ORAL at 08:10

## 2018-11-18 RX ADMIN — HYDROXYZINE HYDROCHLORIDE 25 MG: 25 TABLET ORAL at 20:31

## 2018-11-18 RX ADMIN — MULTIPLE VITAMINS W/ MINERALS TAB 1 TABLET: TAB at 08:11

## 2018-11-18 RX ADMIN — ACETAMINOPHEN 650 MG: 325 TABLET, FILM COATED ORAL at 14:24

## 2018-11-18 RX ADMIN — GABAPENTIN 600 MG: 300 CAPSULE ORAL at 08:10

## 2018-11-18 RX ADMIN — OXYBUTYNIN CHLORIDE 10 MG: 10 TABLET, FILM COATED, EXTENDED RELEASE ORAL at 20:31

## 2018-11-18 RX ADMIN — Medication 25 MG: at 08:11

## 2018-11-18 RX ADMIN — CHOLECALCIFEROL CAP 125 MCG (5000 UNIT) 5000 UNITS: 125 CAP at 08:11

## 2018-11-18 RX ADMIN — ENOXAPARIN SODIUM 40 MG: 40 INJECTION SUBCUTANEOUS at 20:31

## 2018-11-18 RX ADMIN — OMEPRAZOLE 40 MG: 20 CAPSULE, DELAYED RELEASE ORAL at 08:11

## 2018-11-18 RX ADMIN — SERTRALINE HYDROCHLORIDE 150 MG: 50 TABLET ORAL at 20:30

## 2018-11-18 RX ADMIN — NITROFURANTOIN (MONOHYDRATE/MACROCRYSTALS) 100 MG: 75; 25 CAPSULE ORAL at 08:11

## 2018-11-18 RX ADMIN — OXYBUTYNIN CHLORIDE 10 MG: 10 TABLET, FILM COATED, EXTENDED RELEASE ORAL at 08:11

## 2018-11-18 RX ADMIN — VALACYCLOVIR HYDROCHLORIDE 500 MG: 500 TABLET, FILM COATED ORAL at 20:31

## 2018-11-18 RX ADMIN — Medication 35 MG: at 20:31

## 2018-11-18 RX ADMIN — BACLOFEN 20 MG: 20 TABLET ORAL at 14:24

## 2018-11-18 RX ADMIN — GABAPENTIN 900 MG: 300 CAPSULE ORAL at 20:30

## 2018-11-18 RX ADMIN — ACETAMINOPHEN 650 MG: 325 TABLET, FILM COATED ORAL at 08:15

## 2018-11-18 NOTE — PLAN OF CARE
Problem: Goal Outcome Summary  Goal: Goal Outcome Summary  Outcome: No Change  Patient is alert and oriented x4. Speech is clear and is able to communicate needs. She c/o pain and muscle spams, tylenol prn given twice this shift. She is on scheduled baclofen but also received prn baclofen at 1420. Left knee incision with liquid bandage. Insicion site cleansed with wound cleanser and was rewrapped with ace wraps. Hinged knee brace in place. Continue with current POC.

## 2018-11-18 NOTE — PLAN OF CARE
Problem: Goal Outcome Summary  Goal: Goal Outcome Summary  Outcome: No Change  Alert and oriented x4. Able to verbalize needs. Patient c/o left leg pain and received tylenol at 2047 with good relief. Atarax also given for anxiety. Left knee incision dressing CDI. Patient was incontinent of bladder. Had a bowel movement this shift. Patient was offered a shower  this evening but she declined. She is in bed with the call light within reach.

## 2018-11-18 NOTE — PROGRESS NOTES
"Patient seen and examined    S- feels better. No new complaint    4 point ROS done and neg unless mentioned     O-   /57 (BP Location: Left arm)  Pulse 70  Temp 97.4  F (36.3  C) (Oral)  Resp 18  Ht 1.746 m (5' 8.75\")  Wt 57.8 kg (127 lb 8 oz)  LMP  (LMP Unknown)  SpO2 97%  BMI 18.97 kg/m2   Gen- pleasant   HEENT- NAD, LASHON  Neck- supple  CVS- I+II+ no m/r/g  RS- CTAB  Abdo- soft, no tenderness . No g/r/r   Ext- trace edema RLE  MSk - LLE in dsg and support    LABS:   BMP    Recent Labs  Lab 11/12/18  0622      POTASSIUM 4.4   CHLORIDE 111*   JORDON 9.0   CO2 27   BUN 20   CR 0.85   *     CBC    Recent Labs  Lab 11/15/18  0711 11/12/18  0622   WBC  --  3.2*   RBC  --  2.48*   HGB  --  7.4*   HCT  --  23.3*   MCV  --  94   MCH  --  29.8   MCHC  --  31.8   RDW  --  13.4    204     A/P:  Admitted to TCU 11/6/2018 for rehab needs.      11/18- no change    # Left distal femur fracture 10/31.  S/P ORIF 11.01.   # L ankle fracture on Xray, non displaced. Reviewed by Ortho.      L LE pain, muscle spasms most helped by prn + scheduled baclofen (25mg, 30 mg), ordered 20 mg daily prn for muscle spasms .   Oxycodone prn- minimize/avoid as able.   Acetaminophen prn.   Gabapentin 600, 900 mg  Hydroxyzine prn - adjuvant pain, anxiety, sleep.   11/14- added prn Robaxin       PT/OT consult. Therapy as tolerated  -WB: TTWB LLE, hinged knee brace 0-40.  - Orthotics consult requested for short CAM boot to see if brace and boot can work together. Boot recommended for any WB activity.    DVT PPx: Per Ortho: Lovenox SQ total 4 weeks.      #  Advanced multiple sclerosis  diagnosed 1993, manifested again predominantly by left-sided weakness w Urinary incontinence ? Neurogenic bladder. Last treated with Lemtrada 5-day infusion last year.   Stable at current. Follows Neurology as outpatient.   - Continue Valacyclovir 500 bid ppx, Oxybutynin.   - Outpatient follow-up with Neurology.       * 11.10: Patient " requested for her monthly labs per her Neurology, while she is here, including cbc w diff, bmp, UA, TSH, Ft4, CD4. (absolute CD4-76!! - will start Bactrim ppx)    11/15- D/w patient's Neurology clinic. Aware of low CD4 count due to her medicine for MS. They recommend only valcyclovir but no need for any other medicine for prophylaxis    # History of pancytopenia attributed to prior treatment for multiple sclerosis. Mild thrombocytopenia, stable  - follow-up CBC weekly  - transfuse for Hgb<7.0. started Ferrous sulphate -11/14      # Essential hypertension, remains stable off PTA hydrochlorothiazide  - Ct to monitor.   - Ct to hold hydrochlorothiazide      # History of recurrent urinary tract infection.  On Nitrofurantoin for suppression.  11.01 recent Urine culture: no growth.    - Continue home Nitrofurantoin.     # Acute blood loss superimposed on normochromic normocytic anemia, stable      # Constipation, chronic 2nd to MS, aggravated by opiates, improved, with BM yesterday:   - Ct bowel regimen.     # Depression, anxiety: mood stable.   - Continue home sertraline 150 mg daily.     Basil Bacon MD (Pager- [phone])   Internal Medicine/ Hospitalist      Addendum: 11/15- D/w patient's Neurology clinic. Aware of low CD4 count due to her medicine for MS. They recommend only valcyclovir but no need for any other medicine for prophylaxis

## 2018-11-18 NOTE — PLAN OF CARE
Problem: Goal Outcome Summary  Goal: Goal Outcome Summary  Outcome: No Change  No new issues or concerns. Incontinent of urine x1 - also called for bedpan x1 and was continent B&B, med.formed BM. Has no c/o's pain, discomfort, CP and SOB. LLE hinged knee brace intact and elevated on pillow.

## 2018-11-19 PROCEDURE — 97530 THERAPEUTIC ACTIVITIES: CPT | Mod: GP | Performed by: PHYSICAL THERAPIST

## 2018-11-19 PROCEDURE — 97110 THERAPEUTIC EXERCISES: CPT | Mod: GP | Performed by: PHYSICAL THERAPIST

## 2018-11-19 PROCEDURE — 25000132 ZZH RX MED GY IP 250 OP 250 PS 637: Performed by: INTERNAL MEDICINE

## 2018-11-19 PROCEDURE — 12000022 ZZH R&B SNF

## 2018-11-19 PROCEDURE — 97535 SELF CARE MNGMENT TRAINING: CPT | Mod: GO | Performed by: OCCUPATIONAL THERAPIST

## 2018-11-19 PROCEDURE — A9270 NON-COVERED ITEM OR SERVICE: HCPCS | Performed by: INTERNAL MEDICINE

## 2018-11-19 PROCEDURE — 40000193 ZZH STATISTIC PT WARD VISIT: Performed by: PHYSICAL THERAPIST

## 2018-11-19 PROCEDURE — 40000133 ZZH STATISTIC OT WARD VISIT: Performed by: OCCUPATIONAL THERAPIST

## 2018-11-19 PROCEDURE — 97110 THERAPEUTIC EXERCISES: CPT | Mod: GO | Performed by: OCCUPATIONAL THERAPIST

## 2018-11-19 PROCEDURE — 25000128 H RX IP 250 OP 636: Performed by: INTERNAL MEDICINE

## 2018-11-19 RX ADMIN — CHOLECALCIFEROL CAP 125 MCG (5000 UNIT) 5000 UNITS: 125 CAP at 07:53

## 2018-11-19 RX ADMIN — ACETAMINOPHEN 650 MG: 325 TABLET, FILM COATED ORAL at 07:54

## 2018-11-19 RX ADMIN — Medication 35 MG: at 20:09

## 2018-11-19 RX ADMIN — ACETAMINOPHEN 650 MG: 325 TABLET, FILM COATED ORAL at 20:10

## 2018-11-19 RX ADMIN — VALACYCLOVIR HYDROCHLORIDE 500 MG: 500 TABLET, FILM COATED ORAL at 20:10

## 2018-11-19 RX ADMIN — OXYBUTYNIN CHLORIDE 10 MG: 10 TABLET, FILM COATED, EXTENDED RELEASE ORAL at 07:54

## 2018-11-19 RX ADMIN — NITROFURANTOIN (MONOHYDRATE/MACROCRYSTALS) 100 MG: 75; 25 CAPSULE ORAL at 07:55

## 2018-11-19 RX ADMIN — ACETAMINOPHEN 650 MG: 325 TABLET, FILM COATED ORAL at 14:36

## 2018-11-19 RX ADMIN — SERTRALINE HYDROCHLORIDE 150 MG: 50 TABLET ORAL at 20:10

## 2018-11-19 RX ADMIN — Medication 500 MG: at 17:35

## 2018-11-19 RX ADMIN — BACLOFEN 20 MG: 20 TABLET ORAL at 14:36

## 2018-11-19 RX ADMIN — OMEPRAZOLE 40 MG: 20 CAPSULE, DELAYED RELEASE ORAL at 07:53

## 2018-11-19 RX ADMIN — Medication 325 MG: at 07:54

## 2018-11-19 RX ADMIN — GABAPENTIN 600 MG: 300 CAPSULE ORAL at 07:54

## 2018-11-19 RX ADMIN — MULTIPLE VITAMINS W/ MINERALS TAB 1 TABLET: TAB at 07:53

## 2018-11-19 RX ADMIN — GABAPENTIN 900 MG: 300 CAPSULE ORAL at 20:10

## 2018-11-19 RX ADMIN — CALCIUM CARBONATE (ANTACID) CHEW TAB 500 MG 1000 MG: 500 CHEW TAB at 20:09

## 2018-11-19 RX ADMIN — Medication 25 MG: at 07:54

## 2018-11-19 RX ADMIN — HYDROXYZINE HYDROCHLORIDE 25 MG: 25 TABLET ORAL at 20:10

## 2018-11-19 RX ADMIN — ENOXAPARIN SODIUM 40 MG: 40 INJECTION SUBCUTANEOUS at 20:10

## 2018-11-19 RX ADMIN — VALACYCLOVIR HYDROCHLORIDE 500 MG: 500 TABLET, FILM COATED ORAL at 07:54

## 2018-11-19 RX ADMIN — OXYBUTYNIN CHLORIDE 10 MG: 10 TABLET, FILM COATED, EXTENDED RELEASE ORAL at 20:10

## 2018-11-19 NOTE — PROGRESS NOTES
"O. Two week post op wound check. Pt denies pain, swelling, fevers, wound drainage. Wearing cam boot and hinged knee brace. 0-40 locked in extension when up.   /52 (BP Location: Left arm)  Pulse 73  Temp 98.4  F (36.9  C) (Oral)  Resp 16  Ht 1.746 m (5' 8.75\")  Wt 57.8 kg (127 lb 8 oz)  LMP  (LMP Unknown)  SpO2 98%  BMI 18.97 kg/m2  PE: nad, wound dressed, wound lateral side clean, no redness, edges approximated. No swelling,   A: s/p ORIF on 11-1. xrays pending.  R: continue to protect wound with tubi-, gauze to prevent irritation from HKB.  TTWB  Will review films when complete.  Follow up with Dr Valadez  6weeks from dos appointment requested.     "

## 2018-11-19 NOTE — PLAN OF CARE
Problem: Goal Outcome Summary  Goal: Goal Outcome Summary  Outcome: No Change  Alert and oriented x4. Able to communicate needs. No complain of SOB, no CP. Tylenol given twice this shift for left leg pain.Baclofen prn also given for muscle spasms Participated in therapy. Incontinent of bladder.

## 2018-11-19 NOTE — PLAN OF CARE
"Rehabilitation Medicine Wheelchair Face to Face     Diagnosis: ORIF left distal femur.   Currently has limited mobility due to left leg fracture with TTWB precautions leading to BLE strength deficits.  Current transfer status: minimal assist slide board transfer bed to/from wheelchair.    Patient requires a left elevating leg rest for her wheelchair due to inability to flex left knee greater than 40 degrees (per MD order of knee immobilizer brace from 0-40 degrees) in order to put it on a non-elevating leg rest. Patient requires her left leg to be supported for safety in sitting and propelling wheelchair at home.    Arm and leg strength: BUE grossly 4-/5.  LLE hip flexion 2-/5, knee extension 1/5, knee flexion 1/5. RLE hip flexion 2-/5, knee extension 3-/5, knee flexion 3-/5    Gait/balance/coordination: patient is non ambulatory at this time due to left leg fracture, knee immobilizer brace and TTWB precautions. Patient also has history of MS which further impacts safety in standing/weight bearing activities at this time.     Length of need: 6 months    Current height: 5'8.75\"  Current weight: 57.8 kg  : 1955    Dr. Basil Bacon MD. NPI # [id number]    Signature:  Date:  "

## 2018-11-19 NOTE — PLAN OF CARE
Problem: Goal Outcome Summary  Goal: Goal Outcome Summary  PT: patient IND with w/c propulsion 140 feet x 2. Pete for management of LLE during and set up for slideboard transfers - patient able to verbalize process without cues from therapist.

## 2018-11-19 NOTE — PLAN OF CARE
Problem: Goal Outcome Summary  Goal: Goal Outcome Summary  Outcome: Improving  VSS; alert and oriented x4. Independently positioning; stayed in bed this shift. L leg wrapped with brace present. Denies numbness/tingling. Mild L leg pain controlled with prn tylenol. Tolerating regular diet well. Incontinent of urine x2. Large BM in bedpan this shift. Cooperative with nursing cares; able to make needs known.

## 2018-11-19 NOTE — PLAN OF CARE
"Problem: Goal Outcome Summary  Goal: Goal Outcome Summary  Outcome: No Change  Pt.wound vac with \"blockage alert\" and \"low pressure alert\". Unable to maintain adequate seal > 2hrs. Vac dsg removed, slow removal of sponge in process d/t pain and sticking to wound. Large amounts of saline placed along edges and into sponge to ease removal of sponge. Noted increased redness along posterior portion on wound, there is thin, creamy drainage, no odor, that is pooling underneath vac dsg that is also causing vac dsg to no longer adhere to skin. Periwound cleansed with NS. Medicated pt.with Tylenol / Atarax and Oxycodone @ 0330 d/t severe pain from sponge removal.       "

## 2018-11-19 NOTE — PLAN OF CARE
Problem: Goal Outcome Summary  Goal: Goal Outcome Summary  OT: AM session focused on brief mgmt while in supine. Pt req'd min A to doff pull-up while rolling onto left side.     During PM session, pt voicing incontinence during PT session. Discussed with pt on trialling liner insert into pull-up. Complete UB strengthening in supine with HOB increased d/t pt voicing fatigue and declining getting OOB following recent xray.

## 2018-11-19 NOTE — PLAN OF CARE
Problem: Goal Outcome Summary  Goal: Goal Outcome Summary  Outcome: No Change  Pt.A&Ox4. Appears to be sleeping well tonight. Has no c/o's pain, discomfort, CP and SOB. LLE with hinged knee brace intact - elevated on pillow. Refused offer assistance with repositioning, states she shifts weight indep.

## 2018-11-20 ENCOUNTER — TELEPHONE (OUTPATIENT)
Dept: ORTHOPEDICS | Facility: CLINIC | Age: 63
End: 2018-11-20

## 2018-11-20 PROCEDURE — 25000132 ZZH RX MED GY IP 250 OP 250 PS 637: Performed by: INTERNAL MEDICINE

## 2018-11-20 PROCEDURE — 97530 THERAPEUTIC ACTIVITIES: CPT | Mod: GP | Performed by: PHYSICAL THERAPIST

## 2018-11-20 PROCEDURE — 12000022 ZZH R&B SNF

## 2018-11-20 PROCEDURE — A9270 NON-COVERED ITEM OR SERVICE: HCPCS | Performed by: INTERNAL MEDICINE

## 2018-11-20 PROCEDURE — 40000133 ZZH STATISTIC OT WARD VISIT: Performed by: OCCUPATIONAL THERAPIST

## 2018-11-20 PROCEDURE — 40000193 ZZH STATISTIC PT WARD VISIT: Performed by: PHYSICAL THERAPIST

## 2018-11-20 PROCEDURE — 25000128 H RX IP 250 OP 636: Performed by: INTERNAL MEDICINE

## 2018-11-20 PROCEDURE — 97535 SELF CARE MNGMENT TRAINING: CPT | Mod: GO | Performed by: OCCUPATIONAL THERAPIST

## 2018-11-20 PROCEDURE — 99316 NF DSCHRG MGMT 30 MIN+: CPT | Performed by: INTERNAL MEDICINE

## 2018-11-20 PROCEDURE — 97110 THERAPEUTIC EXERCISES: CPT | Mod: GO | Performed by: OCCUPATIONAL THERAPIST

## 2018-11-20 RX ORDER — FERROUS SULFATE 325(65) MG
325 TABLET ORAL DAILY
Qty: 100 TABLET | COMMUNITY
Start: 2018-11-21 | End: 2021-08-16

## 2018-11-20 RX ADMIN — ACETAMINOPHEN 650 MG: 325 TABLET, FILM COATED ORAL at 20:05

## 2018-11-20 RX ADMIN — ENOXAPARIN SODIUM 40 MG: 40 INJECTION SUBCUTANEOUS at 20:05

## 2018-11-20 RX ADMIN — CHOLECALCIFEROL CAP 125 MCG (5000 UNIT) 5000 UNITS: 125 CAP at 07:37

## 2018-11-20 RX ADMIN — OXYCODONE HYDROCHLORIDE 2.5 MG: 5 TABLET ORAL at 05:07

## 2018-11-20 RX ADMIN — OXYBUTYNIN CHLORIDE 10 MG: 10 TABLET, FILM COATED, EXTENDED RELEASE ORAL at 20:06

## 2018-11-20 RX ADMIN — Medication 25 MG: at 07:38

## 2018-11-20 RX ADMIN — HYDROXYZINE HYDROCHLORIDE 25 MG: 25 TABLET ORAL at 03:23

## 2018-11-20 RX ADMIN — VALACYCLOVIR HYDROCHLORIDE 500 MG: 500 TABLET, FILM COATED ORAL at 07:37

## 2018-11-20 RX ADMIN — OXYBUTYNIN CHLORIDE 10 MG: 10 TABLET, FILM COATED, EXTENDED RELEASE ORAL at 07:38

## 2018-11-20 RX ADMIN — SERTRALINE HYDROCHLORIDE 150 MG: 50 TABLET ORAL at 20:05

## 2018-11-20 RX ADMIN — CALCIUM CARBONATE (ANTACID) CHEW TAB 500 MG 1000 MG: 500 CHEW TAB at 20:12

## 2018-11-20 RX ADMIN — Medication 500 MG: at 09:30

## 2018-11-20 RX ADMIN — Medication 35 MG: at 20:05

## 2018-11-20 RX ADMIN — HYDROXYZINE HYDROCHLORIDE 25 MG: 25 TABLET ORAL at 20:05

## 2018-11-20 RX ADMIN — OMEPRAZOLE 40 MG: 20 CAPSULE, DELAYED RELEASE ORAL at 07:38

## 2018-11-20 RX ADMIN — Medication 325 MG: at 07:38

## 2018-11-20 RX ADMIN — VALACYCLOVIR HYDROCHLORIDE 500 MG: 500 TABLET, FILM COATED ORAL at 20:05

## 2018-11-20 RX ADMIN — MULTIPLE VITAMINS W/ MINERALS TAB 1 TABLET: TAB at 07:37

## 2018-11-20 RX ADMIN — NITROFURANTOIN (MONOHYDRATE/MACROCRYSTALS) 100 MG: 75; 25 CAPSULE ORAL at 07:37

## 2018-11-20 RX ADMIN — GABAPENTIN 900 MG: 300 CAPSULE ORAL at 20:05

## 2018-11-20 RX ADMIN — ACETAMINOPHEN 650 MG: 325 TABLET, FILM COATED ORAL at 07:38

## 2018-11-20 RX ADMIN — GABAPENTIN 600 MG: 300 CAPSULE ORAL at 07:37

## 2018-11-20 NOTE — PLAN OF CARE
Problem: Goal Outcome Summary  Goal: Goal Outcome Summary  Occupational Therapy Discharge Summary    Reason for therapy discharge:    Discharged to home with home therapy.    Progress towards therapy goal(s). See goals on [facility] health record for goal details.  Goals met    Therapy recommendation(s):    Continued therapy is recommended.  Rationale/Recommendations:  recommend PT/OT/HHA assistance.  Continue home exercise program.Patient discharging to home with  assist -able to manage light tasks. Patient has an UE HEP , has all AE/;AD at home already from previous stays.,

## 2018-11-20 NOTE — DISCHARGE SUMMARY
Medicine Discharge Summary       Marylee Woods MRN# [id number]   YOB: 1955 Age: 63 year old     Date of Admission:  11/6/2018  Date of Discharge:  11/20  Admitting Physician:  North Wisdom MD  Discharge Physician:  Mary Sahu  Discharging Service:  Internal Medicine     Primary Provider: Carolina Pulliam         Discharge Diagnosis:   Deconditioning   Left distal Femur fracture and ORIF 11/1  Left Ankle fracture , non displaced  Advanced MS  Hx Pancytopenia  Essential HTN  Recurrent UTI hx  Acute on chronic anemia ; due to blood loss   Depression             Consultations:   PT  OT  SW  CC  Orthopedic         Rehab Course by Problem:    Patient was admitted to TCU on 11/6 for rehab after ORIF of left distal fracture , subsequent to mechanical fall during transfer from toilet to wheel chair.  She was followed by orthopedic team during her stay in rehab and did OT and PT.  Kept  on lovenox once daily for DVT ppx.     Advanced multiple sclerosis  diagnosed 1993, manifested again predominantly by left-sided weakness w Urinary incontinence ? Neurogenic bladder. Last treated with Lemtrada 5-day infusion last year.   Stable at current. Follows Neurology as outpatient.   - Continue Valacyclovir 500 bid ppx, Oxybutynin.   - Outpatient follow-up with Neurology.        * 11.10: Patient requested for her monthly labs per her Neurology, while she is here, including cbc w diff, bmp, UA, TSH, Ft4, CD4. (absolute CD4-76- )   11/15- Dr Bacon d/w patient's Neurology clinic. Aware of low CD4 count due to her medicine for MS. They recommend only valcyclovir but no need for any other medicine like Bactrim  for prophylaxis     # History of pancytopenia attributed to prior treatment for multiple sclerosis. Mild thrombocytopenia, stable  - follow-up CBC weekly  - transfuse for Hgb<7.0. started Ferrous sulphate -11/14      # Essential hypertension, remains stable off PTA hydrochlorothiazide  - Ct to monitor.   - Ct  "to hold hydrochlorothiazide      # History of recurrent urinary tract infection.  On Nitrofurantoin for suppression.  11.01 recent Urine culture: no growth.    - Continue home Nitrofurantoin.     # Acute blood loss superimposed on normochromic normocytic anemia, stable  .     # Depression, anxiety: mood stable.   - Continue home sertraline 150 mg daily.     On Exam ;   Alert and oriented . No acute distress  Vital signs:  Temp: 98.2  F (36.8  C) Temp src: Oral BP: 103/54 Pulse: 68   Resp: 16 SpO2: 98 % O2 Device: None (Room air)   Height: 174.6 cm (5' 8.75\") Weight: 57.8 kg (127 lb 8 oz)  Estimated body mass index is 18.97 kg/(m^2) as calculated from the following:    Height as of this encounter: 1.746 m (5' 8.75\").    Weight as of this encounter: 57.8 kg (127 lb 8 oz).  Neck ; supple , no JVD  Chest ; equal BS bilaterally , no rales or rhonchi   CVS; RRR, no murmur /rubs or gallops  GI ; soft abdomen, non tender, BS positive      ROUTINE IP LABS (Last four results)  BMPNo lab results found in last 7 days.  CBC  Recent Labs  Lab 11/15/18  0711        INRNo lab results found in last 7 days.    Results for orders placed or performed during the hospital encounter of 11/06/18   XR Femur Left 2 Views    Narrative    Exam: XR FEMUR LT 2 VW, 11/19/2018 3:13 PM    Indication: post op ORIF, DOS 11-6-2018;     Comparison: X-ray 11/1/2018    Findings:   AP and lateral view of the left femur. Stable postsurgical changes of  distal femoral lateral screw and plate fixation. The plate is not  entirely flush to the lateral aspect of the femoral cortex similar to  prior. Unchanged fracture deformity of the distal femur. Mild  degenerative changes of the left hip. Partially visualized hardware in  the tibia. Osteopenic appearance of bone. Knee joint is grossly  congruent. Small knee joint effusion. Soft tissue unremarkable.      Impression    Impression: Stable postsurgical changes of surgical fixation for known  fracture on " the distal femur.    BERRY VALERIO MD   XR Ankle Left G/E 3 Views    Narrative    3 views left ankle radiographs 11/19/2018 3:46 PM    History: 2 week post op follow up;     Comparison: X-ray 11/5/2018    Findings:    AP, oblique, and lateral  views of the left ankle were obtained.     Previously noted oblique fracture on the distal fibula is not noted on  today's exam. A separate chronic deformity of the distal fibular shaft  which is likely from remote trauma is unchanged. Partially visualized  intramedullary alexus and distal interlocking screws of the tibia.  Osteopenic appearance of bone.    Ankle mortise and syndesmosis are congruent on this non-weight bearing  images.    Soft tissue is unremarkable.      Impression    Impression:  Previously noted oblique fracture on the distal fibula on 11/5/2018 is  not noted on today's examination radiographically.    BERRY VALERIO MD                Discharge Medications:     Current Discharge Medication List      START taking these medications    Details   ferrous sulfate (IRON) 325 (65 Fe) MG tablet Take 1 tablet (325 mg) by mouth daily  Qty: 100 tablet    Associated Diagnoses: MS (multiple sclerosis) (H)         CONTINUE these medications which have CHANGED    Details   enoxaparin (LOVENOX) 40 MG/0.4ML injection Inject 0.4 mLs (40 mg) Subcutaneous every 24 hours for 9 days  Qty: 3.6 mL, Refills: 0    Associated Diagnoses: Closed fracture of distal end of left femur, unspecified fracture morphology, initial encounter (H)         CONTINUE these medications which have NOT CHANGED    Details   acetaminophen (TYLENOL) 325 MG tablet Take 2 tablets (650 mg) by mouth every 4 hours as needed for mild pain or fever (greater than 101 degrees)  Qty: 100 tablet    Associated Diagnoses: Multiple sclerosis exacerbation (H)      !! baclofen 5 MG TABS Take 25 mg by mouth 2 times daily as needed for muscle spasms  Qty: 90 tablet    Associated Diagnoses: MS (multiple sclerosis) (H)      !!  baclofen 5 MG TABS Take 25 mg by mouth every morning    Associated Diagnoses: Closed fracture of distal end of left femur, unspecified fracture morphology, initial encounter (H)      !! baclofen 5 MG TABS Take 35 mg by mouth every evening    Associated Diagnoses: Closed fracture of distal end of left femur, unspecified fracture morphology, initial encounter (H)      cholecalciferol (VITAMIN D3) 5000 units CAPS capsule Take 5,000 Units by mouth daily      !! gabapentin (NEURONTIN) 300 MG capsule Take 2 capsules (600 mg) by mouth daily  Qty: 90 capsule    Associated Diagnoses: MS (multiple sclerosis) (H)      !! gabapentin (NEURONTIN) 300 MG capsule Take 3 capsules (900 mg) by mouth At Bedtime  Qty: 90 capsule    Associated Diagnoses: MS (multiple sclerosis) (H)      multivitamin, therapeutic with minerals (THERA-VIT-M) TABS Take 1 tablet by mouth daily.    Associated Diagnoses: Anemia      NICOTROL 10 MG inhaler Inhale 1 Cartridge into the lungs daily as needed for smoking cessation   Refills: 1      nitroFURantoin, macrocrystal-monohydrate, (MACROBID) 100 MG capsule Take 100 mg by mouth daily      omeprazole (PRILOSEC) 40 MG capsule TAKE 1 CAPSULE BY MOUTH EVERY DAY  Qty: 90 capsule, Refills: 2    Associated Diagnoses: Gastroesophageal reflux disease, esophagitis presence not specified      ORDER FOR DME Equipment being ordered: medical lift chair  Qty: 1 Units, Refills: 0    Comments: [employer], fx# [phone]    Associated Diagnoses: MS (multiple sclerosis) (H)      oxybutynin (DITROPAN-XL) 10 MG 24 hr tablet Take 1 tablet (10 mg) by mouth 2 times daily  Qty: 30 tablet    Associated Diagnoses: Neurogenic bladder      polyethylene glycol (MIRALAX/GLYCOLAX) Packet Take 17 g by mouth daily  Qty: 30 packet, Refills: 1    Associated Diagnoses: Multiple sclerosis exacerbation (H)      sertraline (ZOLOFT) 100 MG tablet TAKE 1.5 TABLETS BY MOUTH DAILY  Qty: 135 tablet, Refills: 1    Associated Diagnoses:  Major depression in complete remission (H)      valACYclovir (VALTREX) 500 MG tablet Take 1 tablet (500 mg) by mouth 2 times daily  Qty: 12 tablet, Refills: 5    Associated Diagnoses: Herpes simplex disease       !! - Potential duplicate medications found. Please discuss with provider.      STOP taking these medications       hydrOXYzine (ATARAX) 25 MG tablet Comments:   Reason for Stopping:         oxyCODONE IR (ROXICODONE) 5 MG tablet Comments:   Reason for Stopping:                    Discharge Instructions and Follow-Up:     Discharge Procedure Orders  Home care nursing referral   Referral Type: Home Health Therapies & Aides     Home Care PT Referral for Hospital Discharge   Referral Type: Home Health Therapies & Aides     Home Care OT Referral for Hospital Discharge     Home Care Social Service Referral for Hospital Discharge     Reason for your hospital stay   Order Comments: You were admitted to the TCU for rehab     Adult [facility] Follow-up and recommended labs and tests   Order Comments: Orthopedic clinic in 2 weeks  PCP in one week for post TCU discharge follow up     Appointments on [facility] and/or [facility] (with [facility] or [employer] [facility] provider or service). Call [phone] if you haven't heard regarding these appointments within 7 days of discharge.     Activity   Order Comments: Toe touch bearing left lower extremity. Hinged brace 0-60 degrees of ROM. Lock brace for standing and for transfers until qudriceps suppot transfers and WB   Order Specific Question Answer Comments   Is discharge order? Yes      MD face to face encounter   Order Comments: Documentation of Face to Face and Certification for Home Health Services    I certify that patient: Marylee Woods is under my care and that I, or a nurse practitioner or physician's assistant working with me, had a face-to-face encounter that meets the physician face-to-face encounter requirements with this patient on: 11/20/2018.    This encounter with the  patient was in whole, or in part, for the following medical condition, which is the primary reason for home health care: deconditioning .    I certify that, based on my findings, the following services are medically necessary home health services: Nursing.OT and PT and SW    My clinical findings support the need for the above services because: Nurse is needed: For complex aftercare of surgical procedures because the patient needs instruction and cannot perform care on their own due to: deconditioning . Wheel  chair bound..    Further, I certify that my clinical findings support that this patient is homebound (i.e. absences from home require considerable and taxing effort and are for medical reasons or [facility] services or infrequently or of short duration when for other reasons)    Based on the above findings. I certify that this patient is confined to the home and needs intermittent skilled nursing care, physical therapy and/or speech therapy.  The patient is under my care, and I have initiated the establishment of the plan of care.  This patient will be followed by a physician who will periodically review the plan of care.  Physician/Provider to provide follow up care: Carolina Pulliam    Attending hospital physician (the Medicare certified PEC provider): Mary Sahu  Physician Signature: See electronic signature associated with these discharge orders.  Date: 11/20/2018     Diet   Order Comments: Regular adult diet   Order Specific Question Answer Comments   Is discharge order? Yes                  Discharge Disposition:   32  minutes spent in discharge, including >50% in counseling and coordination of care, medication review and plan of care recommended on follow up. Questions were answered to satisfaction       Mary Sahu  Internal Medicine Hospitalist & Staff Physician  [facility]

## 2018-11-20 NOTE — PLAN OF CARE
"Problem: Goal Outcome Summary  Goal: Goal Outcome Summary  Outcome: No Change  Alert and oriented x4. Patient denies SOB,  Speech is clear and is able to communicate needs. C/O muscle spasms and received prn robaxin with good relief. PRN tylenol given prior to am therapy. Patient is also on scheduled baclofen. Incision to left leg ALEK, tubi- in place. Patient  Is incontinent of bladder. Staff to continue to monitor. /54 (BP Location: Left arm)  Pulse 68  Temp 98.2  F (36.8  C) (Oral)  Resp 16  Ht 1.746 m (5' 8.75\")  Wt 57.8 kg (127 lb 8 oz)  LMP  (LMP Unknown)  SpO2 98%  BMI 18.97 kg/m2.         "

## 2018-11-20 NOTE — UTILIZATION REVIEW
Pain Interview    1. Have you had pain or hurting at any time in the last 5 days?  Yes  2. How much of the time have you experienced pain or hurting over the last 5 days? Occasionally  3. Over the past 5 days, has pain made it hard for you to sleep at night?  Yes  4. Over the past 5 days, have you limited your day-to-day activities because of pain?Yes  5. Rate pain intensity: Numeric 9

## 2018-11-20 NOTE — PLAN OF CARE
Problem: Goal Outcome Summary  Goal: Goal Outcome Summary  Outcome: No Change  RN:  Pt c/o L leg pain but hesitant to take Oxycodone for the reason that she has not taken this for awhile now ( last 11/12). Denies any muscle spasm and doesn't want Tylenol since this pain is more than her usual. Atarax 25 mg tab given per pt request. Encouraged her to call if pain doesn't resolved. No further complains. L knee hinged brace in place. See evening nurse note re: timing of applying tubigrips as ordered. No numbness and tingling sensation. Appear to be sleeping/resting between cares. Continue with plan of care.

## 2018-11-20 NOTE — PLAN OF CARE
Problem: Goal Outcome Summary  Goal: Goal Outcome Summary  Physical Therapy Discharge Summary    Reason for therapy discharge:    Discharged to home with home therapy.    Progress towards therapy goal(s). [facility] for goal details.  Goals partially met.  Barriers to achieving goals: unable to be completely IND with management of w/c legs at this point in time due to knee brace ROM restrictions; unable to perform car transfer secondary to her medical condition - will take metro mobility when needed; still requires Charity for slideboard transfers.    Therapy recommendation(s):    Continued therapy is recommended.  Rationale/Recommendations:  to decrease caregiver burden with slideboard transfers and w/c set up; continue to improve BLE strength for ambulation when able..  Continue home exercise program.

## 2018-11-20 NOTE — PLAN OF CARE
Problem: Goal Outcome Summary  Goal: Goal Outcome Summary  OT: OT last day today. Pt. Spouse has completed training and will be able to support patient as needed following discharge. Pt. Recommended to have Home OT and HHA following discharge.  Pt. In agreement with OT discharge date and plans to return home 11/21.

## 2018-11-20 NOTE — TELEPHONE ENCOUNTER
----- Message from Mallory Blanca ATC sent at 11/20/2018  9:08 AM CST -----  Would you be able to call this patient and help them get scheduled?   ----- Message -----     From: Perlita Mott APRN CNP     Sent: 11/19/2018   8:46 AM       To: [facility]    Pt needs to be scheduled with Dr Valadez for a 6 week post op check with femur, ankle xray. DOS 11-1-2018    Thank you   Perlita

## 2018-11-20 NOTE — PLAN OF CARE
Problem: Goal Outcome Summary  Goal: Goal Outcome Summary  Outcome: No Change  VSS. Alert and orientedx4. Able to used call light and make needs known. Incontinent of bladder. ACE wraps and dressing to L leg CDI, to be changed tomorrow. Dr Bacon ordered for tubi  instead of ace wraps but pt declined and wanted to changed to tubi  tomorrow instead together with wound care. With complaints of mild pain and spasm to L leg, Prn Robaxin, tylenol and atarax given with relief. Denies any numbness or tingling sensation. Appetite good. Hinged brace intact and locked.   Will continue plan of care.   Vital signs:  Temp: 98.6  F (37  C) Temp src: Oral BP: 134/64 Pulse: 63   Resp: 16 SpO2: 99 % O2 Device: None (Room air)

## 2018-11-20 NOTE — PLAN OF CARE
Films reviewed by Ortho. All images acceptable.  Plan:  Continue cam boot, Hinged knee brace. Pt may have brace adjusted to 0-60 degrees of flexion from 0-40. Orders addended. Weight bearing status remains unchanged.

## 2018-11-20 NOTE — TELEPHONE ENCOUNTER
Called patient to assist them in scheduling a 6 wk pop with . Asked them to give us a call back. Did not mention available times due to being unsure if I was able to leave a detailed message. Times are 12/11/18 at 7:20 am, 10:40 am, or 11:50am or 12/14 at 7:50am or 12:50 am

## 2018-11-21 VITALS
TEMPERATURE: 98.5 F | OXYGEN SATURATION: 100 % | RESPIRATION RATE: 18 BRPM | SYSTOLIC BLOOD PRESSURE: 99 MMHG | DIASTOLIC BLOOD PRESSURE: 58 MMHG | BODY MASS INDEX: 18.88 KG/M2 | HEART RATE: 81 BPM | HEIGHT: 69 IN | WEIGHT: 127.5 LBS

## 2018-11-21 PROCEDURE — 25000132 ZZH RX MED GY IP 250 OP 250 PS 637: Performed by: INTERNAL MEDICINE

## 2018-11-21 PROCEDURE — A9270 NON-COVERED ITEM OR SERVICE: HCPCS | Performed by: INTERNAL MEDICINE

## 2018-11-21 PROCEDURE — 00220000 ZZH SNF RUG CODE OPNP

## 2018-11-21 RX ADMIN — Medication 500 MG: at 12:50

## 2018-11-21 RX ADMIN — Medication 25 MG: at 07:58

## 2018-11-21 RX ADMIN — OMEPRAZOLE 40 MG: 20 CAPSULE, DELAYED RELEASE ORAL at 07:58

## 2018-11-21 RX ADMIN — CHOLECALCIFEROL CAP 125 MCG (5000 UNIT) 5000 UNITS: 125 CAP at 07:57

## 2018-11-21 RX ADMIN — VALACYCLOVIR HYDROCHLORIDE 500 MG: 500 TABLET, FILM COATED ORAL at 07:58

## 2018-11-21 RX ADMIN — MULTIPLE VITAMINS W/ MINERALS TAB 1 TABLET: TAB at 07:58

## 2018-11-21 RX ADMIN — ACETAMINOPHEN 650 MG: 325 TABLET, FILM COATED ORAL at 12:50

## 2018-11-21 RX ADMIN — NITROFURANTOIN (MONOHYDRATE/MACROCRYSTALS) 100 MG: 75; 25 CAPSULE ORAL at 07:58

## 2018-11-21 RX ADMIN — OXYBUTYNIN CHLORIDE 10 MG: 10 TABLET, FILM COATED, EXTENDED RELEASE ORAL at 07:58

## 2018-11-21 RX ADMIN — GABAPENTIN 600 MG: 300 CAPSULE ORAL at 07:57

## 2018-11-21 RX ADMIN — Medication 325 MG: at 07:58

## 2018-11-21 RX ADMIN — ACETAMINOPHEN 650 MG: 325 TABLET, FILM COATED ORAL at 07:57

## 2018-11-21 NOTE — PLAN OF CARE
Problem: Goal Outcome Summary  Goal: Goal Outcome Summary  Outcome: Adequate for Discharge Date Met: 11/21/18  Alert and oriented x4. Able to communicate needs. Patient was medicated with tylenol twice this shift. Robaxin also given for muscle spasms. Patient is incontinent of bladder. Left leg incision with liquid bandage and is ALEK. Hinged knee brace in place. Patient discharged home with all her belongings including her medications.Prior to discharge, writer went through discharge instructions with patient, no new complains/concerns. Patient left through [facility] mobility transportation.

## 2018-11-21 NOTE — PLAN OF CARE
Problem: Goal Outcome Summary  Goal: Goal Outcome Summary  Outcome: Improving  VSS. Alert and orientedx4. Cooperative. Denies any SOB, chest pain, HA, dizziness. Continued TTWB to LLE. Incision to LLE ALEK, was covered with tubigrips and hinged knee brace. Hinged knee brace at 60 degrees of flexion noted and was unlocked.  Remains incontinent of bladder. Abdomen soft and non tender. Bowel sounds active. With c/o of muscle spasm/pain to LLE, atarax and Tylenol given per pts request with relief. Plan to discharge tomorrow.   Vital signs:  Temp: 98.3  F (36.8  C) Temp src: Oral BP: 103/49 Pulse: 71   Resp: 18 SpO2: 99 % O2 Device: None (Room air)

## 2018-11-21 NOTE — PLAN OF CARE
Problem: Goal Outcome Summary  Goal: Goal Outcome Summary  Patient alert and oriented. Incontinent of urine wears incontinent brief . Appears sleeping during rounds. Denies any pain and discomfort. No respiratory distress noted. L hinged brace and cam boot on. Patient able to communicate needs. Will continue to monitor patient's status.

## 2018-11-21 NOTE — DISCHARGE INSTRUCTIONS
Toe touch weightbearing on left lower extremity. Hinged knee brace from 0-60 degrees of ROM. Recommend locking brace for standing and transfers until quad supports transfers and WB.    Use Tubi  to hold dressing in place and not an ace wrap.

## 2018-11-21 NOTE — PROGRESS NOTES
[facility]   Met with pt to discuss plans for HC.  Pt to be discharged home 11 21 18  and has agreed to have [facility] follow with services of SN, PT, OT, SW and HHA. Patient care support center processing referral.  Pt verbalized understanding that initial visit is scheduled for  11 22 18 or 11 23 18.  Pt has 24 hour phone number for [facility] for any questions or concerns.

## 2018-11-23 ENCOUNTER — TELEPHONE (OUTPATIENT)
Dept: FAMILY MEDICINE | Facility: CLINIC | Age: 63
End: 2018-11-23

## 2018-11-23 NOTE — TELEPHONE ENCOUNTER
ED / Discharge Outreach Protocol    Patient Contact    Attempt # 1    Was call answered?  No.  Left message on voicemail with information to call triage back.

## 2018-11-23 NOTE — TELEPHONE ENCOUNTER
"Hospital/TCU/ED for chronic condition Discharge Protocol    \"Hi, my name is Constanza Zepeda, a registered nurse, and I am calling from [facility].  I am calling to follow up and see how things are going for you after your recent emergency visit/hospital/TCU stay.\"    Tell me how you are doing now that you are home?\"  Adjusting to moving around differently, but doing well.       Discharge Instructions    \"Let's review your discharge instructions.  What is/are the follow-up recommendations?  Pt. Response: no, I think I know it all    \"Has an appointment with your primary care provider been scheduled?\"   Yes. (confirm)    \"When you see the provider, I would recommend that you bring your medications with you.\"    Medications    \"Tell me what changed about your medicines when you discharged?\"    Changes to chronic meds?    0-1    \"What questions do you have about your medications?\"    None, just started lovenox     New diagnoses of heart failure, COPD, diabetes, or MI?    No              Medication reconciliation completed? Yes  Was MTM referral placed (*Make sure to put transitions as reason for referral)?   No    Call Summary    \"What questions or concerns do you have about your recent visit and your follow-up care?\"     none    \"If you have questions or things don't continue to improve, we encourage you contact us through the main clinic number (give number).  Even if the clinic is not open, triage nurses are available 24/7 to help you.     We would like you to know that our clinic has extended hours (provide information).  We also have urgent care (provide details on closest location and hours/contact info)\"      \"Thank you for your time and take care!\"             "

## 2018-11-26 ENCOUNTER — TELEPHONE (OUTPATIENT)
Dept: ORTHOPEDICS | Facility: CLINIC | Age: 63
End: 2018-11-26

## 2018-11-26 ENCOUNTER — TELEPHONE (OUTPATIENT)
Dept: FAMILY MEDICINE | Facility: CLINIC | Age: 63
End: 2018-11-26

## 2018-11-26 NOTE — TELEPHONE ENCOUNTER
Reason for Call:  Home Health Care    [facility]  [facility] called regarding (reason for call): orders    Orders are needed for this patient. OT    PT: na    OT: 2 x in November.  5 x in December.    Skilled Nursing: na    Pt Provider: Dr Pulliam    Phone Number Homecare Nurse can be reached at: [phone]    Can we leave a detailed message on this number? YES    Phone number patient can be reached at: Home number on file [phone] (home)    Best Time: any    Call taken on 11/26/2018 at 2:11 PM by ERYN BRIDGES

## 2018-11-26 NOTE — TELEPHONE ENCOUNTER
[facility] OT Contact    Attempt # 1    Was call answered?  No.  Left message on voicemail with information to call me back.

## 2018-11-26 NOTE — TELEPHONE ENCOUNTER
[facility] Call Center    Phone Message    May a detailed message be left on voicemail: yes    Reason for Call: Order(s): Home Care Orders: Physical Therapy (PT): 1x a wk for 1 wk - 2x a wk for 3 wks - 1x a wk for 2 wks     Action Taken: Message routed to:  [facility] ([facility] Orthopedics

## 2018-11-26 NOTE — TELEPHONE ENCOUNTER
Left a message for Malorie giving verbal authorization for the homecare PT orders stated below.    Basim Dixon, ATC

## 2018-11-27 ENCOUNTER — OFFICE VISIT (OUTPATIENT)
Dept: FAMILY MEDICINE | Facility: CLINIC | Age: 63
End: 2018-11-27
Payer: MEDICARE

## 2018-11-27 ENCOUNTER — TRANSFERRED RECORDS (OUTPATIENT)
Dept: HEALTH INFORMATION MANAGEMENT | Facility: CLINIC | Age: 63
End: 2018-11-27

## 2018-11-27 VITALS
DIASTOLIC BLOOD PRESSURE: 64 MMHG | SYSTOLIC BLOOD PRESSURE: 120 MMHG | HEART RATE: 66 BPM | TEMPERATURE: 98.8 F | OXYGEN SATURATION: 99 %

## 2018-11-27 DIAGNOSIS — D61.818 PANCYTOPENIA (H): ICD-10-CM

## 2018-11-27 DIAGNOSIS — S72.402A CLOSED FRACTURE OF DISTAL END OF LEFT FEMUR, UNSPECIFIED FRACTURE MORPHOLOGY, INITIAL ENCOUNTER (H): Primary | ICD-10-CM

## 2018-11-27 DIAGNOSIS — D64.9 ANEMIA, UNSPECIFIED TYPE: ICD-10-CM

## 2018-11-27 DIAGNOSIS — G35 MS (MULTIPLE SCLEROSIS) (H): ICD-10-CM

## 2018-11-27 DIAGNOSIS — D69.6 THROMBOCYTOPENIA (H): ICD-10-CM

## 2018-11-27 DIAGNOSIS — F32.5 MAJOR DEPRESSION IN COMPLETE REMISSION (H): ICD-10-CM

## 2018-11-27 PROCEDURE — 82728 ASSAY OF FERRITIN: CPT | Performed by: FAMILY MEDICINE

## 2018-11-27 PROCEDURE — 36415 COLL VENOUS BLD VENIPUNCTURE: CPT | Performed by: FAMILY MEDICINE

## 2018-11-27 PROCEDURE — 83540 ASSAY OF IRON: CPT | Performed by: FAMILY MEDICINE

## 2018-11-27 PROCEDURE — 85025 COMPLETE CBC W/AUTO DIFF WBC: CPT | Performed by: FAMILY MEDICINE

## 2018-11-27 PROCEDURE — 83550 IRON BINDING TEST: CPT | Performed by: FAMILY MEDICINE

## 2018-11-27 PROCEDURE — 99495 TRANSJ CARE MGMT MOD F2F 14D: CPT | Performed by: FAMILY MEDICINE

## 2018-11-27 RX ORDER — BUPROPION HYDROCHLORIDE 150 MG/1
150 TABLET ORAL EVERY MORNING
Qty: 90 TABLET | Refills: 1 | Status: SHIPPED | OUTPATIENT
Start: 2018-11-27 | End: 2019-05-13

## 2018-11-27 RX ORDER — SERTRALINE HYDROCHLORIDE 100 MG/1
TABLET, FILM COATED ORAL
Qty: 135 TABLET | Refills: 1 | Status: SHIPPED | OUTPATIENT
Start: 2018-11-27 | End: 2019-12-18

## 2018-11-27 ASSESSMENT — PATIENT HEALTH QUESTIONNAIRE - PHQ9
SUM OF ALL RESPONSES TO PHQ QUESTIONS 1-9: 5
10. IF YOU CHECKED OFF ANY PROBLEMS, HOW DIFFICULT HAVE THESE PROBLEMS MADE IT FOR YOU TO DO YOUR WORK, TAKE CARE OF THINGS AT HOME, OR GET ALONG WITH OTHER PEOPLE: SOMEWHAT DIFFICULT
SUM OF ALL RESPONSES TO PHQ QUESTIONS 1-9: 5

## 2018-11-27 NOTE — MR AVS SNAPSHOT
After Visit Summary   11/27/2018    Marylee Woods    MRN: [id number]           Patient Information     Date Of Birth          1955        Visit Information        Provider Department      11/27/2018 9:00 AM Carolina Pulliam MD [facility]        Today's Diagnoses     Closed fracture of distal end of left femur, unspecified fracture morphology, initial encounter (H)    -  1    Thrombocytopenia (H)        Pancytopenia (H)        MS (multiple sclerosis) (H)        Anemia, unspecified type        Major depression in complete remission (H)          Care Instructions    1. We'll recheck your hemoglobin and iron studies today.  2. Depending on results you may be able to stop your iron supplement (ferrous sulfate).  I'll send you a letter with these results and plan.  3. For your depression: Add Wellbutrin 1 pill once a day in the morning.  4. Make appointment with your really good therapist.  5. Have you looked into [facility] in [place].?  I printed information about this for you.  6. I'll refer you for home therapist and you can see if it's a good fit.          Follow-ups after your visit        Your next 10 appointments already scheduled     Dec 03, 2018  8:00 AM CST   Level 7 with SH INFUSION CHAIR 6   [facility] ([facility])    [facility]  [address]  [facility] [address]   [phone]            Dec 04, 2018  8:00 AM CST   Level 7 with SH INFUSION CHAIR 4   [facility] ([facility])    [facility]  [address]  [facility] [address]   [phone]            Dec 05, 2018  8:00 AM CST   Level 7 with SH INFUSION CHAIR 4   [facility] ([facility])    [facility]  [address]  [place] [address]   [phone]             Dec 14, 2018 12:50 PM CST   (Arrive by 12:35 PM)   Post-Op with Jose Valadez MD   [facility] ([facility] [place])    [address]  [address] [facility] [address] [phone]              Who to contact     If you have questions or need follow up information about today's clinic visit or your schedule please contact [facility] directly at [phone].  Normal or non-critical lab and imaging results will be communicated to you by MyChart, letter or phone within 4 business days after the clinic has received the results. If you do not hear from us within 7 days, please contact the clinic through MyChart or phone. If you have a critical or abnormal lab result, we will notify you by phone as soon as possible.  Submit refill requests through [facility] or call your pharmacy and they will forward the refill request to us. Please allow 3 business days for your refill to be completed.          Additional Information About Your Visit        Care EveryWhere ID     This is your Care EveryWhere ID. This could be used by other organizations to access your [facility] medical records  [id number]        Your Vitals Were     Pulse Temperature Last Period Pulse Oximetry          66 98.8  F (37.1  C) (Tympanic) (LMP Unknown) 99%         Blood Pressure from Last 3 Encounters:   11/27/18 120/64   11/21/18 99/58   11/06/18 118/64    Weight from Last 3 Encounters:   11/08/18 127 lb 8 oz (57.8 kg)   10/31/18 125 lb (56.7 kg)   12/12/17 130 lb (59 kg)              We Performed the Following     CBC with platelets differential     Ferritin     Iron and iron binding capacity          Today's Medication Changes          These changes are accurate as of 11/27/18  9:33 AM.  If you have any questions, ask your nurse or doctor.               Start taking these medicines.        Dose/Directions    buPROPion 150 MG 24 hr tablet   Commonly known as:   WELLBUTRIN XL   Used for:  Major depression in complete remission (H)   Started by:  Carolina Pulliam MD        Dose:  150 mg   Take 1 tablet (150 mg) by mouth every morning   Quantity:  90 tablet   Refills:  1         Stop taking these medicines if you haven't already. Please contact your care team if you have questions.     order for DME   Stopped by:  Carolina Pulliam MD                Where to get your medicines      These medications were sent to [facility]/pharmacy #[id number] - [place], [place] - [address]  [address] [facility] [address]     Phone:  [phone]     buPROPion 150 MG 24 hr tablet    sertraline 100 MG tablet                Primary Care Provider Office Phone # Fax #    Carolina Pulliam [phone] [address] [address]        Equal Access to Services     [facility] : Hadii gabriela calderon Soanmol, waaxda luqadaha, qaybta kaalmada ernesto, kaylen palma . So [facility] [phone].    ATENCIÓN: Si habla español, tiene a de oliveira disposición servicios gratuitos de asistencia lingüística. [facility] [phone].    We comply with applicable federal civil rights laws and [place] laws. We do not discriminate on the basis of race, color, national origin, age, disability, sex, sexual orientation, or gender identity.            Thank you!     Thank you for choosing [facility]  for your care. Our goal is always to provide you with excellent care. Hearing back from our patients is one way we can continue to improve our services. Please take a few minutes to complete the written survey that you may receive in the mail after your visit with us. Thank you!             Your Updated Medication List - Protect others around you: Learn how to safely use, store and throw away your medicines at www.disposemymeds.org.          This list is accurate as of 11/27/18  9:33 AM.  Always use your most recent  med list.                   Brand Name Dispense Instructions for use Diagnosis    acetaminophen 325 MG tablet    TYLENOL    100 tablet    Take 2 tablets (650 mg) by mouth every 4 hours as needed for mild pain or fever (greater than 101 degrees)    Multiple sclerosis exacerbation (H)       * Baclofen 5 MG Tabs      Take 25 mg by mouth every morning    Closed fracture of distal end of left femur, unspecified fracture morphology, initial encounter (H)       * Baclofen 5 MG Tabs      Take 35 mg by mouth every evening    Closed fracture of distal end of left femur, unspecified fracture morphology, initial encounter (H)       * Baclofen 5 MG Tabs     90 tablet    Take 25 mg by mouth 2 times daily as needed for muscle spasms    MS (multiple sclerosis) (H)       buPROPion 150 MG 24 hr tablet    WELLBUTRIN XL    90 tablet    Take 1 tablet (150 mg) by mouth every morning    Major depression in complete remission (H)       cholecalciferol 5000 units (125 mcg) Caps capsule    vitamin D3     Take 7,000 Units by mouth daily        enoxaparin 40 MG/0.4ML syringe    LOVENOX    3.6 mL    Inject 0.4 mLs (40 mg) Subcutaneous every 24 hours for 9 days    Closed fracture of distal end of left femur, unspecified fracture morphology, initial encounter (H)       ferrous sulfate 325 (65 Fe) MG tablet    IRON    100 tablet    Take 1 tablet (325 mg) by mouth daily    MS (multiple sclerosis) (H)       * gabapentin 300 MG capsule    NEURONTIN    90 capsule    Take 3 capsules (900 mg) by mouth At Bedtime    MS (multiple sclerosis) (H)       * gabapentin 300 MG capsule    NEURONTIN    90 capsule    Take 2 capsules (600 mg) by mouth daily    MS (multiple sclerosis) (H)       multivitamin w/minerals tablet      Take 1 tablet by mouth daily.    Anemia       NICOTROL 10 MG inhaler   Generic drug:  nicotine      Inhale 1 Cartridge into the lungs daily as needed for smoking cessation        nitroFURantoin macrocrystal-monohydrate 100 MG capsule     MACROBID     Take 100 mg by mouth daily        omeprazole 40 MG DR capsule    priLOSEC    90 capsule    TAKE 1 CAPSULE BY MOUTH EVERY DAY    Gastroesophageal reflux disease, esophagitis presence not specified       oxybutynin ER 10 MG 24 hr tablet    DITROPAN-XL    30 tablet    Take 1 tablet (10 mg) by mouth 2 times daily    Neurogenic bladder       polyethylene glycol Packet    MIRALAX/GLYCOLAX    30 packet    Take 17 g by mouth daily    Multiple sclerosis exacerbation (H)       sertraline 100 MG tablet    ZOLOFT    135 tablet    TAKE 1 TABLETS BY MOUTH DAILY    Major depression in complete remission (H)       valACYclovir 500 MG tablet    VALTREX    12 tablet    Take 1 tablet (500 mg) by mouth 2 times daily    Herpes simplex disease       * Notice:  This list has 5 medication(s) that are the same as other medications prescribed for you. Read the directions carefully, and ask your doctor or other care provider to review them with you.

## 2018-11-27 NOTE — PATIENT INSTRUCTIONS
1. We'll recheck your hemoglobin and iron studies today.  2. Depending on results you may be able to stop your iron supplement (ferrous sulfate).  I'll send you a letter with these results and plan.  3. For your depression: Add Wellbutrin 1 pill once a day in the morning.  4. Make appointment with your really good therapist.  5. Have you looked into [facility] in [place].?  I printed information about this for you.  6. I'll refer you for home therapist and you can see if it's a good fit.

## 2018-11-27 NOTE — LETTER
November 28, 2018      Marylee Woods  [address]  [facility] [address]        Dear ,    We are writing to inform you of your test results.    It was nice to see you in clinic yesterday.  Your hemoglobin is much better (up to 9.6 from 7.4) but still lower than I'd like.  Please continue your ferrous sulfate (iron pills) for another 2 months, then we'll check your lab tests again.  Most likely at that point you will be able to stop your iron pills, or at least cut down on them.      Resulted Orders   CBC with platelets differential   Result Value Ref Range    WBC 3.2 (L) 4.0 - 11.0 10e9/L    RBC Count 3.19 (L) 3.8 - 5.2 10e12/L    Hemoglobin 9.6 (L) 11.7 - 15.7 g/dL    Hematocrit 30.6 (L) 35.0 - 47.0 %    MCV 96 78 - 100 fl    MCH 30.1 26.5 - 33.0 pg    MCHC 31.4 (L) 31.5 - 36.5 g/dL    RDW 13.8 10.0 - 15.0 %    Platelet Count 179 150 - 450 10e9/L    % Neutrophils 78.0 %    % Lymphocytes 11.8 %    % Monocytes 6.2 %    % Eosinophils 3.4 %    % Basophils 0.6 %    Absolute Neutrophil 2.5 1.6 - 8.3 10e9/L    Absolute Lymphocytes 0.4 (L) 0.8 - 5.3 10e9/L    Absolute Monocytes 0.2 0.0 - 1.3 10e9/L    Absolute Eosinophils 0.1 0.0 - 0.7 10e9/L    Absolute Basophils 0.0 0.0 - 0.2 10e9/L    Diff Method Automated Method    Ferritin   Result Value Ref Range    Ferritin 351 (H) 8 - 252 ng/mL   Iron and iron binding capacity   Result Value Ref Range    Iron 60 35 - 180 ug/dL    Iron Binding Cap 240 240 - 430 ug/dL    Iron Saturation Index 25 15 - 46 %       If you have any questions or concerns, please call the clinic at the number listed above.       Sincerely,        Carolina Pulliam MD

## 2018-11-27 NOTE — PROGRESS NOTES
SUBJECTIVE:   Marylee Woods is a 63 year old female who presents to clinic today for the following health issues:    Hospital Follow-up Visit:    Hospital/Nursing Home/IP Rehab Facility: [facility]  Date of Admission: 11-6-18  Date of Discharge: 11-21-18  Reason(s) for Admission: broke left leg            Problems taking medications regularly:  None       Medication changes since discharge: None       Problems adhering to non-medication therapy:  None  Summary of hospitalization:  [facility] discharge summary reviewed  Diagnostic Tests/Treatments reviewed.  Follow up needed:   1. CBC recheck.  2. Ortho follow up (scheduled on 12/14  3. MS Neuro Follow Up (Dr. Redman)  Other Healthcare Providers Involved in Patient s Care:         Homecare, Specialist appointment - Ortho, MS Doc, Surgical follow-up appointment - Ortho and Physical Therapy  Update since discharge: improved.    Post Discharge Medication Reconciliation: discharge medications reconciled and changed, per note/orders (see AVS).  Plan of care communicated with patient              PROBLEMS TO ADD ON...    1. Still tired.  Sleep better now that home though.    2. Depression.  Still going on, feels down.  [facility].  Has excellent therapist but hasn't seen her in a year or more.  Wonders what else she can do?  At 150 mg of zoloft was too flattened, couldn't cry.  But now down.  PHQ-9 SCORE 3/16/2018 6/13/2018 11/27/2018   PHQ-9 Total Score - - -   PHQ-9 Total Score MyChart - - 5 (Mild depression)   PHQ-9 Total Score 4 5 5       Problem list and histories reviewed & adjusted, as indicated.  Additional history: as documented    Reviewed and updated as needed this visit by clinical staff  Tobacco  Allergies  Meds  Problems  Med Hx  Surg Hx  Fam Hx  Soc Hx        Reviewed and updated as needed this visit by Provider  Problems         ROS:  Constitutional, HEENT, cardiovascular, pulmonary, gi  and gu systems are negative, except as otherwise noted.    OBJECTIVE:     /64 (Cuff Size: Adult Regular)  Pulse 66  Temp 98.8  F (37.1  C) (Tympanic)  LMP  (LMP Unknown)  SpO2 99%  There is no height or weight on file to calculate BMI.   GENERAL: healthy, alert and no distress  NEURO: Wheelchair bound, dry mouth, findings of MS.  ORTHO: Left ankle in a walking boot    Unresulted Labs Ordered in the Past 30 Days of this Admission     Date and Time Order Name Status Description    11/27/2018 0906 IRON AND IRON BINDING CAPACITY In process     11/27/2018 0906 FERRITIN In process     11/27/2018 0906 CBC WITH PLATELETS DIFFERENTIAL In process             ASSESSMENT:       ICD-10-CM    1. Closed fracture of distal end of left femur, unspecified fracture morphology, initial encounter (H) S72.402A    2. Thrombocytopenia (H) D69.6 CBC with platelets differential     Ferritin     Iron and iron binding capacity   3. Pancytopenia (H) D61.818 CBC with platelets differential     Ferritin     Iron and iron binding capacity   4. MS (multiple sclerosis) (H) G35    5. Anemia, unspecified type D64.9 CBC with platelets differential     Ferritin     Iron and iron binding capacity   6. Major depression in complete remission (H) F32.5 sertraline (ZOLOFT) 100 MG tablet     buPROPion (WELLBUTRIN XL) 150 MG 24 hr tablet         PLAN:     For depression:     Add therapy.  Has therapist and also will [facility] to [facility] program.    Add wellbutrin.  Looks to be okay with MS and other medications.      Follow up with phone visit in 1 month to recheck PHQ9 and depression, or in person if would rather.    For anemia:    Check CBC, ferritin, TIBC.      Adjust iron dosage based on results.    For fracture:    Seeing ortho 12/14.    Pain well controlled.     Healing well.    For MS:    Next infusion in December.    Follows with Dr. Redman.    [place].        Patient Instructions   4. We'll recheck your  hemoglobin and iron studies today.  5. Depending on results you may be able to stop your iron supplement (ferrous sulfate).  I'll send you a letter with these results and plan.  6. For your depression: Add Wellbutrin 1 pill once a day in the morning.  7. Make appointment with your really good therapist.  8. Have you looked into [facility] in [place].?  I printed information about this for you.  9. I'll refer you for home therapist and you can see if it's a good fit.      Carolina Pulliam MD  [facility]    Answers for HPI/ROS submitted by the patient on 11/27/2018   If you checked off any problems, how difficult have these problems made it for you to do your work, take care of things at home, or get along with other people?: Somewhat difficult  PHQ9 TOTAL SCORE: 5

## 2018-11-28 LAB
BASOPHILS # BLD AUTO: 0 10E9/L (ref 0–0.2)
BASOPHILS NFR BLD AUTO: 0.6 %
DIFFERENTIAL METHOD BLD: ABNORMAL
EOSINOPHIL # BLD AUTO: 0.1 10E9/L (ref 0–0.7)
EOSINOPHIL NFR BLD AUTO: 3.4 %
ERYTHROCYTE [DISTWIDTH] IN BLOOD BY AUTOMATED COUNT: 13.8 % (ref 10–15)
FERRITIN SERPL-MCNC: 351 NG/ML (ref 8–252)
HCT VFR BLD AUTO: 30.6 % (ref 35–47)
HGB BLD-MCNC: 9.6 G/DL (ref 11.7–15.7)
IRON SATN MFR SERPL: 25 % (ref 15–46)
IRON SERPL-MCNC: 60 UG/DL (ref 35–180)
LYMPHOCYTES # BLD AUTO: 0.4 10E9/L (ref 0.8–5.3)
LYMPHOCYTES NFR BLD AUTO: 11.8 %
MCH RBC QN AUTO: 30.1 PG (ref 26.5–33)
MCHC RBC AUTO-ENTMCNC: 31.4 G/DL (ref 31.5–36.5)
MCV RBC AUTO: 96 FL (ref 78–100)
MONOCYTES # BLD AUTO: 0.2 10E9/L (ref 0–1.3)
MONOCYTES NFR BLD AUTO: 6.2 %
NEUTROPHILS # BLD AUTO: 2.5 10E9/L (ref 1.6–8.3)
NEUTROPHILS NFR BLD AUTO: 78 %
PLATELET # BLD AUTO: 179 10E9/L (ref 150–450)
RBC # BLD AUTO: 3.19 10E12/L (ref 3.8–5.2)
TIBC SERPL-MCNC: 240 UG/DL (ref 240–430)
WBC # BLD AUTO: 3.2 10E9/L (ref 4–11)

## 2018-11-29 ENCOUNTER — TELEPHONE (OUTPATIENT)
Dept: PHARMACY | Facility: CLINIC | Age: 63
End: 2018-11-29

## 2018-11-29 NOTE — TELEPHONE ENCOUNTER
I called Aisha at Dr Redman's office to find out why the [place] infusion scheduled to start 12/3/18 were cancelled. Per Aisha the patient recently had surgery s/p bone fracture and will need some time to heal. The provider the patient in a few weeks, then Aisha will call our clinic to schedule the start date again.    Jerry Galindo PharmD.

## 2018-11-30 ENCOUNTER — TELEPHONE (OUTPATIENT)
Dept: FAMILY MEDICINE | Facility: CLINIC | Age: 63
End: 2018-11-30

## 2018-11-30 NOTE — TELEPHONE ENCOUNTER
Our goal is to have forms completed within 72 hours, however some forms may require a visit or additional information.    What clinic location was the form placed at [facility] or [place].?     Who is the form from? Home Care  Where did the form come from? Faxed to clinic   The form was placed in the inbox of Carolina Pulliam MD      Please fax to [phone]    Additional comments: [facility] oders for OT 11/26/2018    Call take on 11/30/2018 at 12:09 PM by Britta Byrne

## 2018-12-12 ENCOUNTER — TELEPHONE (OUTPATIENT)
Dept: FAMILY MEDICINE | Facility: CLINIC | Age: 63
End: 2018-12-12

## 2018-12-12 DIAGNOSIS — M89.8X5 PAIN OF LEFT FEMUR: Primary | ICD-10-CM

## 2018-12-12 NOTE — TELEPHONE ENCOUNTER
Our goal is to have forms completed within 72 hours, however some forms may require a visit or additional information.    What clinic location was the form placed at [facility] or [place].?     Who is the form from? Home Care  Where did the form come from? Faxed to clinic   The form was placed in the inbox of Carolina Pulilam MD      Please fax to [phone]    Additional comments: [facility] cert 11/22/18-01/20/19    Call take on 12/12/2018 at 3:59 PM by Britta Byrne

## 2018-12-14 ENCOUNTER — ANCILLARY PROCEDURE (OUTPATIENT)
Dept: GENERAL RADIOLOGY | Facility: CLINIC | Age: 63
End: 2018-12-14
Attending: ORTHOPAEDIC SURGERY
Payer: COMMERCIAL

## 2018-12-14 ENCOUNTER — OFFICE VISIT (OUTPATIENT)
Dept: ORTHOPEDICS | Facility: CLINIC | Age: 63
End: 2018-12-14
Payer: COMMERCIAL

## 2018-12-14 VITALS — BODY MASS INDEX: 18.81 KG/M2 | HEIGHT: 69 IN | WEIGHT: 127 LBS

## 2018-12-14 DIAGNOSIS — S82.892A CLOSED FRACTURE OF LEFT ANKLE, INITIAL ENCOUNTER: ICD-10-CM

## 2018-12-14 DIAGNOSIS — S82.892A CLOSED FRACTURE OF LEFT ANKLE, INITIAL ENCOUNTER: Primary | ICD-10-CM

## 2018-12-14 ASSESSMENT — MIFFLIN-ST. JEOR: SCORE: 1191.48

## 2018-12-14 NOTE — LETTER
12/14/2018      RE: Marylee Woods  [address]  [facility] [address]       Spine Surgery Return Clinic Visit      Chief Complaint:   Surgical Followup      Interval HPI:  Symptom Profile Including: location of symptoms, onset, severity, exacerbating/alleviating factors, previous treatments:        Marylee Woods is a 63 year old female who returns now 6 weeks status post open reduction internal fixation of left distal femur fracture with a lateral plate.  Her wounds have healed well.  She has minimal knee pain.  She also had a nondisplaced lateral malleolus fracture which was treated in a boot.  She has been in a hinged knee brace nonweightbearing.  She has been doing physical therapy.  She has multiple sclerosis and was wheelchair-bound before the operation.            Past Medical History:     Past Medical History:   Diagnosis Date     Gastro-oesophageal reflux disease      Multiple sclerosis (H)             Past Surgical History:     Past Surgical History:   Procedure Laterality Date     C/SECTION, LOW TRANSVERSE  1992     COLONOSCOPY  2007     COLONOSCOPY N/A 1/26/2017    Procedure: COMBINED COLONOSCOPY, SINGLE OR MULTIPLE BIOPSY/POLYPECTOMY BY BIOPSY;  Surgeon: Mayo Adkins MD, MD;  Location:  GI     ESOPHAGOSCOPY, GASTROSCOPY, DUODENOSCOPY (EGD), COMBINED  1/26/2017    Dr. Adkins [facility]     ESOPHAGOSCOPY, GASTROSCOPY, DUODENOSCOPY (EGD), COMBINED N/A 1/26/2017    Procedure: COMBINED ESOPHAGOSCOPY, GASTROSCOPY, DUODENOSCOPY (EGD), BIOPSY SINGLE OR MULTIPLE;  Surgeon: Mayo Adkins MD, MD;  Location:  GI     HIP SURGERY  2009    femur ortho surgery     LAPAROSCOPIC CHOLECYSTECTOMY  6/24/2014    Procedure: LAPAROSCOPIC CHOLECYSTECTOMY;  Surgeon: Randy Bailey MD;  Location: [facility]     OPEN REDUCTION INTERNAL FIXATION FEMUR DISTAL Left 11/1/2018    Procedure: OPEN REDUCTION INTERNAL FIXATION LEFT FEMUR DISTAL;  Surgeon: Jose Valadez MD;  Location:  OR     OPEN REDUCTION INTERNAL  FIXATION RODDING INTRAMEDULLARY TIBIA  4/14/2013    Procedure: OPEN REDUCTION INTERNAL FIXATION RODDING INTRAMEDULLARY TIBIA;;  Surgeon: Sajan Cast MD;  Location: [facility] OR     ORTHOPEDIC SURGERY  2009    surgery right upper femur fx near hip            Social History:     Social History     Tobacco Use     Smoking status: Current Every Day Smoker     Packs/day: 0.25     Types: Cigarettes     Smokeless tobacco: Former User     Quit date: 12/16/2017   Substance Use Topics     Alcohol use: Yes     Alcohol/week: 0.0 oz     Comment: 1/wk            Family History:     Family History   Problem Relation Age of Onset     Heart Disease Maternal Grandmother      Cancer Maternal Grandfather      Heart Disease Paternal Grandfather      Heart Disease Mother      Heart Disease Father      Diabetes No family hx of      Coronary Artery Disease No family hx of      Hypertension No family hx of      Hyperlipidemia No family hx of      Cerebrovascular Disease No family hx of      Breast Cancer No family hx of      Colon Cancer No family hx of      Prostate Cancer No family hx of      Other Cancer No family hx of      Depression No family hx of      Anxiety Disorder No family hx of      Mental Illness No family hx of      Substance Abuse No family hx of      Anesthesia Reaction No family hx of      Asthma No family hx of      Osteoporosis No family hx of      Genetic Disorder No family hx of      Thyroid Disease No family hx of      Obesity No family hx of      Unknown/Adopted No family hx of             Allergies:     Allergies   Allergen Reactions     Amantadine      hallucinations     Budesonide      Symbicort Rash            Medications:     Current Outpatient Medications   Medication     acetaminophen (TYLENOL) 325 MG tablet     baclofen 5 MG TABS     baclofen 5 MG TABS     baclofen 5 MG TABS     buPROPion (WELLBUTRIN XL) 150 MG 24 hr tablet     cholecalciferol (VITAMIN D3) 5000 units CAPS capsule     ferrous sulfate (IRON) 325  "(65 Fe) MG tablet     gabapentin (NEURONTIN) 300 MG capsule     gabapentin (NEURONTIN) 300 MG capsule     multivitamin, therapeutic with minerals (THERA-VIT-M) TABS     NICOTROL 10 MG inhaler     nitroFURantoin, macrocrystal-monohydrate, (MACROBID) 100 MG capsule     omeprazole (PRILOSEC) 40 MG capsule     oxybutynin (DITROPAN-XL) 10 MG 24 hr tablet     polyethylene glycol (MIRALAX/GLYCOLAX) Packet     sertraline (ZOLOFT) 100 MG tablet     valACYclovir (VALTREX) 500 MG tablet     No current facility-administered medications for this visit.              Review of Systems:   A focused musculoskeletal and neurologic ROS was performed with pertinent positives and negatives noted in the HPI.  Additional systems were also reviewed and are documented at the bottom of the note.         Physical Exam:   Vitals: Ht 1.746 m (5' 8.75\")   Wt 57.6 kg (127 lb)   LMP  (LMP Unknown)   BMI 18.89 kg/m     Musculoskeletal, Neurologic, and Spine:     Left leg is examined.  Her incisions are healed well.  She has mild to moderate edema throughout the leg.  She is unable to perform a straight leg raise which is consistent with her preoperative exam from multiple sclerosis.  Intact sensation circumferentially in the leg.           Imaging:   We ordered and independently reviewed new radiographs at this clinic visit. The results were discussed with the patient. Findings include:     AP and lateral left knee radiographs show intact implants with no evidence of interval complication.  She also has AP and lateral left ankle films which show no fracture displacement.       Assessment and Plan:     63 year old female with expected healing 6 weeks status post open reduction internal fixation of left distal femur fracture.    At this point she can have knee range of motion from 0-90.  I am going to continue the brace for another 6 weeks.  At that point she can come out of the brace.  She can have toe-touch weightbearing on the left side for " transfers.  I think she can be done with the left ankle boot and wear normal shoe.    I was asked to comment on her ability to have steroids as a part of an infusion for her multiple sclerosis.  They told me this would be a 1 g dose on 3 separate days which I think is a low dose and likely would have minimal impact on her fracture healing so I am okay with this level of steroids if it is needed for her multiple sclerosis on a short-term basis.     Follow-up with me in 6 weeks with repeat left knee and left ankle radiographs at that time      Respectfully,  Jose Valadez MD  Spine Surgery  [facility]      Jose Valadez MD

## 2018-12-14 NOTE — NURSING NOTE
"Reason For Visit:   Chief Complaint   Patient presents with     Surgical Followup       Ht 1.746 m (5' 8.75\")   Wt 57.6 kg (127 lb)   LMP  (LMP Unknown)   BMI 18.89 kg/m      Pain Assessment  Patient Currently in Pain: Yes  0-10 Pain Scale: 6  Primary Pain Location: Leg  Pain Descriptors: Melania Dixon ATC  "

## 2018-12-14 NOTE — LETTER
12/14/2018       RE: Marylee Woods  [address]  [facility] [address]     Dear Colleague,    Thank you for referring your patient, Marylee Woods, [facility] Please see a copy of my visit note below.    Spine Surgery Return Clinic Visit    Chief Complaint:   Surgical Followup    Interval HPI:  Symptom Profile Including: location of symptoms, onset, severity, exacerbating/alleviating factors, previous treatments:        Marylee Woods is a 63 year old female who returns now 6 weeks status post open reduction internal fixation of left distal femur fracture with a lateral plate.  Her wounds have healed well.  She has minimal knee pain.  She also had a nondisplaced lateral malleolus fracture which was treated in a boot.  She has been in a hinged knee brace nonweightbearing.  She has been doing physical therapy.  She has multiple sclerosis and was wheelchair-bound before the operation.         Past Medical History:     Past Medical History:   Diagnosis Date     Gastro-oesophageal reflux disease      Multiple sclerosis (H)             Past Surgical History:     Past Surgical History:   Procedure Laterality Date     C/SECTION, LOW TRANSVERSE  1992     COLONOSCOPY  2007     COLONOSCOPY N/A 1/26/2017    Procedure: COMBINED COLONOSCOPY, SINGLE OR MULTIPLE BIOPSY/POLYPECTOMY BY BIOPSY;  Surgeon: Mayo Adkins MD, MD;  Location:  GI     ESOPHAGOSCOPY, GASTROSCOPY, DUODENOSCOPY (EGD), COMBINED  1/26/2017    [facility]     ESOPHAGOSCOPY, GASTROSCOPY, DUODENOSCOPY (EGD), COMBINED N/A 1/26/2017    Procedure: COMBINED ESOPHAGOSCOPY, GASTROSCOPY, DUODENOSCOPY (EGD), BIOPSY SINGLE OR MULTIPLE;  Surgeon: Mayo Adkins MD, MD;  Location:  GI     HIP SURGERY  2009    femur ortho surgery     LAPAROSCOPIC CHOLECYSTECTOMY  6/24/2014    Procedure: LAPAROSCOPIC CHOLECYSTECTOMY;  Surgeon: Randy Bailey MD;  Location: [facility]     OPEN REDUCTION  INTERNAL FIXATION FEMUR DISTAL Left 11/1/2018    Procedure: OPEN REDUCTION INTERNAL FIXATION LEFT FEMUR DISTAL;  Surgeon: Jose Valadez MD;  [facility]     OPEN REDUCTION INTERNAL FIXATION RODDING INTRAMEDULLARY TIBIA  4/14/2013    Procedure: OPEN REDUCTION INTERNAL FIXATION RODDING INTRAMEDULLARY TIBIA;;  Surgeon: Sajan Cast MD;  [facility]     ORTHOPEDIC SURGERY  2009    surgery right upper femur fx near hip          Social History:     Social History     Tobacco Use     Smoking status: Current Every Day Smoker     Packs/day: 0.25     Types: Cigarettes     Smokeless tobacco: Former User     Quit date: 12/16/2017   Substance Use Topics     Alcohol use: Yes     Alcohol/week: 0.0 oz     Comment: 1/wk            Family History:     Family History   Problem Relation Age of Onset     Heart Disease Maternal Grandmother      Cancer Maternal Grandfather      Heart Disease Paternal Grandfather      Heart Disease Mother      Heart Disease Father      Diabetes No family hx of      Coronary Artery Disease No family hx of      Hypertension No family hx of      Hyperlipidemia No family hx of      Cerebrovascular Disease No family hx of      Breast Cancer No family hx of      Colon Cancer No family hx of      Prostate Cancer No family hx of      Other Cancer No family hx of      Depression No family hx of      Anxiety Disorder No family hx of      Mental Illness No family hx of      Substance Abuse No family hx of      Anesthesia Reaction No family hx of      Asthma No family hx of      Osteoporosis No family hx of      Genetic Disorder No family hx of      Thyroid Disease No family hx of      Obesity No family hx of      Unknown/Adopted No family hx of           Allergies:     Allergies   Allergen Reactions     Amantadine      hallucinations     Budesonide      Symbicort Rash            Medications:     Current Outpatient Medications   Medication     acetaminophen (TYLENOL) 325 MG tablet      "baclofen 5 MG TABS     baclofen 5 MG TABS     baclofen 5 MG TABS     buPROPion (WELLBUTRIN XL) 150 MG 24 hr tablet     cholecalciferol (VITAMIN D3) 5000 units CAPS capsule     ferrous sulfate (IRON) 325 (65 Fe) MG tablet     gabapentin (NEURONTIN) 300 MG capsule     gabapentin (NEURONTIN) 300 MG capsule     multivitamin, therapeutic with minerals (THERA-VIT-M) TABS     NICOTROL 10 MG inhaler     nitroFURantoin, macrocrystal-monohydrate, (MACROBID) 100 MG capsule     omeprazole (PRILOSEC) 40 MG capsule     oxybutynin (DITROPAN-XL) 10 MG 24 hr tablet     polyethylene glycol (MIRALAX/GLYCOLAX) Packet     sertraline (ZOLOFT) 100 MG tablet     valACYclovir (VALTREX) 500 MG tablet     No current facility-administered medications for this visit.           Physical Exam:   Vitals: Ht 1.746 m (5' 8.75\")   Wt 57.6 kg (127 lb)   LMP  (LMP Unknown)   BMI 18.89 kg/m     Musculoskeletal, Neurologic, and Spine:     Left leg is examined.  Her incisions are healed well.  She has mild to moderate edema throughout the leg.  She is unable to perform a straight leg raise which is consistent with her preoperative exam from multiple sclerosis.  Intact sensation circumferentially in the leg.           Imaging:   We ordered and independently reviewed new radiographs at this clinic visit. The results were discussed with the patient. Findings include:     AP and lateral left knee radiographs show intact implants with no evidence of interval complication.  She also has AP and lateral left ankle films which show no fracture displacement.     Assessment and Plan:     63 year old female with expected healing 6 weeks status post open reduction internal fixation of left distal femur fracture.    At this point she can have knee range of motion from 0-90.  I am going to continue the brace for another 6 weeks.  At that point she can come out of the brace.  She can have toe-touch weightbearing on the left side for transfers.  I think she can be done " with the left ankle boot and wear normal shoe.    I was asked to comment on her ability to have steroids as a part of an infusion for her multiple sclerosis.  They told me this would be a 1 g dose on 3 separate days which I think is a low dose and likely would have minimal impact on her fracture healing so I am okay with this level of steroids if it is needed for her multiple sclerosis on a short-term basis.     Follow-up with me in 6 weeks with repeat left knee and left ankle radiographs at that time    Respectfully,  Jose Valadez MD  Spine Surgery  [facility]

## 2018-12-14 NOTE — PROGRESS NOTES
Spine Surgery Return Clinic Visit      Chief Complaint:   Surgical Followup      Interval HPI:  Symptom Profile Including: location of symptoms, onset, severity, exacerbating/alleviating factors, previous treatments:        Marylee Woods is a 63 year old female who returns now 6 weeks status post open reduction internal fixation of left distal femur fracture with a lateral plate.  Her wounds have healed well.  She has minimal knee pain.  She also had a nondisplaced lateral malleolus fracture which was treated in a boot.  She has been in a hinged knee brace nonweightbearing.  She has been doing physical therapy.  She has multiple sclerosis and was wheelchair-bound before the operation.            Past Medical History:     Past Medical History:   Diagnosis Date     Gastro-oesophageal reflux disease      Multiple sclerosis (H)             Past Surgical History:     Past Surgical History:   Procedure Laterality Date     C/SECTION, LOW TRANSVERSE  1992     COLONOSCOPY  2007     COLONOSCOPY N/A 1/26/2017    Procedure: COMBINED COLONOSCOPY, SINGLE OR MULTIPLE BIOPSY/POLYPECTOMY BY BIOPSY;  Surgeon: Mayo Adkins MD, MD;  Location:  GI     ESOPHAGOSCOPY, GASTROSCOPY, DUODENOSCOPY (EGD), COMBINED  1/26/2017    Dr. Adkins [facility]     ESOPHAGOSCOPY, GASTROSCOPY, DUODENOSCOPY (EGD), COMBINED N/A 1/26/2017    Procedure: COMBINED ESOPHAGOSCOPY, GASTROSCOPY, DUODENOSCOPY (EGD), BIOPSY SINGLE OR MULTIPLE;  Surgeon: Mayo Adkins MD, MD;  Location:  GI     HIP SURGERY  2009    femur ortho surgery     LAPAROSCOPIC CHOLECYSTECTOMY  6/24/2014    Procedure: LAPAROSCOPIC CHOLECYSTECTOMY;  Surgeon: Randy Bailey MD;  Location: [facility]     OPEN REDUCTION INTERNAL FIXATION FEMUR DISTAL Left 11/1/2018    Procedure: OPEN REDUCTION INTERNAL FIXATION LEFT FEMUR DISTAL;  Surgeon: Jose Valadez MD;  Location:  OR     OPEN REDUCTION INTERNAL FIXATION RODDING INTRAMEDULLARY TIBIA  4/14/2013    Procedure: OPEN REDUCTION  INTERNAL FIXATION RODDING INTRAMEDULLARY TIBIA;;  Surgeon: Sajan Cast MD;  [facility]     ORTHOPEDIC SURGERY  2009    surgery right upper femur fx near hip            Social History:     Social History     Tobacco Use     Smoking status: Current Every Day Smoker     Packs/day: 0.25     Types: Cigarettes     Smokeless tobacco: Former User     Quit date: 12/16/2017   Substance Use Topics     Alcohol use: Yes     Alcohol/week: 0.0 oz     Comment: 1/wk            Family History:     Family History   Problem Relation Age of Onset     Heart Disease Maternal Grandmother      Cancer Maternal Grandfather      Heart Disease Paternal Grandfather      Heart Disease Mother      Heart Disease Father      Diabetes No family hx of      Coronary Artery Disease No family hx of      Hypertension No family hx of      Hyperlipidemia No family hx of      Cerebrovascular Disease No family hx of      Breast Cancer No family hx of      Colon Cancer No family hx of      Prostate Cancer No family hx of      Other Cancer No family hx of      Depression No family hx of      Anxiety Disorder No family hx of      Mental Illness No family hx of      Substance Abuse No family hx of      Anesthesia Reaction No family hx of      Asthma No family hx of      Osteoporosis No family hx of      Genetic Disorder No family hx of      Thyroid Disease No family hx of      Obesity No family hx of      Unknown/Adopted No family hx of             Allergies:     Allergies   Allergen Reactions     Amantadine      hallucinations     Budesonide      Symbicort Rash            Medications:     Current Outpatient Medications   Medication     acetaminophen (TYLENOL) 325 MG tablet     baclofen 5 MG TABS     baclofen 5 MG TABS     baclofen 5 MG TABS     buPROPion (WELLBUTRIN XL) 150 MG 24 hr tablet     cholecalciferol (VITAMIN D3) 5000 units CAPS capsule     ferrous sulfate (IRON) 325 (65 Fe) MG tablet     gabapentin (NEURONTIN) 300 MG capsule     gabapentin  "(NEURONTIN) 300 MG capsule     multivitamin, therapeutic with minerals (THERA-VIT-M) TABS     NICOTROL 10 MG inhaler     nitroFURantoin, macrocrystal-monohydrate, (MACROBID) 100 MG capsule     omeprazole (PRILOSEC) 40 MG capsule     oxybutynin (DITROPAN-XL) 10 MG 24 hr tablet     polyethylene glycol (MIRALAX/GLYCOLAX) Packet     sertraline (ZOLOFT) 100 MG tablet     valACYclovir (VALTREX) 500 MG tablet     No current facility-administered medications for this visit.              Review of Systems:   A focused musculoskeletal and neurologic ROS was performed with pertinent positives and negatives noted in the HPI.  Additional systems were also reviewed and are documented at the bottom of the note.         Physical Exam:   Vitals: Ht 1.746 m (5' 8.75\")   Wt 57.6 kg (127 lb)   LMP  (LMP Unknown)   BMI 18.89 kg/m    Musculoskeletal, Neurologic, and Spine:     Left leg is examined.  Her incisions are healed well.  She has mild to moderate edema throughout the leg.  She is unable to perform a straight leg raise which is consistent with her preoperative exam from multiple sclerosis.  Intact sensation circumferentially in the leg.           Imaging:   We ordered and independently reviewed new radiographs at this clinic visit. The results were discussed with the patient. Findings include:     AP and lateral left knee radiographs show intact implants with no evidence of interval complication.  She also has AP and lateral left ankle films which show no fracture displacement.       Assessment and Plan:     63 year old female with expected healing 6 weeks status post open reduction internal fixation of left distal femur fracture.    At this point she can have knee range of motion from 0-90.  I am going to continue the brace for another 6 weeks.  At that point she can come out of the brace.  She can have toe-touch weightbearing on the left side for transfers.  I think she can be done with the left ankle boot and wear normal " shoe.    I was asked to comment on her ability to have steroids as a part of an infusion for her multiple sclerosis.  They told me this would be a 1 g dose on 3 separate days which I think is a low dose and likely would have minimal impact on her fracture healing so I am okay with this level of steroids if it is needed for her multiple sclerosis on a short-term basis.     Follow-up with me in 6 weeks with repeat left knee and left ankle radiographs at that time      Respectfully,  Jose Valadez MD  Spine Surgery  [facility]

## 2018-12-19 DIAGNOSIS — Z53.9 DIAGNOSIS NOT YET DEFINED: Primary | ICD-10-CM

## 2018-12-19 PROCEDURE — G0180 MD CERTIFICATION HHA PATIENT: HCPCS | Performed by: FAMILY MEDICINE

## 2018-12-20 ENCOUNTER — TELEPHONE (OUTPATIENT)
Dept: FAMILY MEDICINE | Facility: CLINIC | Age: 63
End: 2018-12-20

## 2018-12-20 NOTE — TELEPHONE ENCOUNTER
[facility] called requesting order approval for OT orders lymphedema therapy evaluation. Verbal approval was given.

## 2018-12-26 ENCOUNTER — TELEPHONE (OUTPATIENT)
Dept: FAMILY MEDICINE | Facility: CLINIC | Age: 63
End: 2018-12-26

## 2018-12-26 NOTE — TELEPHONE ENCOUNTER
Our goal is to have forms completed within 72 hours, however some forms may require a visit or additional information.    What clinic location was the form placed at [facility] or [place].?     Who is the form from?   Where did the form come from? Faxed to clinic   The form was placed in the inbox of Carolina Pulliam MD      Please fax to [phone]  Phone number: [phone]    Additional comments: [facility] - OT 1 every 30 days 1    Call take on 12/26/2018 at 2:25 PM by Omega Soriano

## 2019-01-01 ENCOUNTER — DOCUMENTATION ONLY (OUTPATIENT)
Dept: CARE COORDINATION | Facility: CLINIC | Age: 64
End: 2019-01-01

## 2019-01-01 NOTE — PROGRESS NOTES
[facility] now requests orders and shares plan of care/discharge summaries for some patients through [employer].  Please REPLY TO THIS MESSAGE OR ROUTE BACK TO THE AUTHOR in order to give authorization for orders when needed.  This is considered a verbal order, you will still receive a faxed copy of orders for signature.  Thank you for your assistance in improving collaboration for our patients.    ORDER    Requesting 4 visits with client to educate on compression garments, bandaging, and long term management of secondary lymphedema.     Matt Orr OTRL, CLT  [phone]  [email or web address]

## 2019-01-03 DIAGNOSIS — R30.0 DYSURIA: ICD-10-CM

## 2019-01-03 LAB
ALBUMIN UR-MCNC: NEGATIVE MG/DL
APPEARANCE UR: CLEAR
BILIRUB UR QL STRIP: NEGATIVE
COLOR UR AUTO: YELLOW
GLUCOSE UR STRIP-MCNC: NEGATIVE MG/DL
HGB UR QL STRIP: NEGATIVE
KETONES UR STRIP-MCNC: NEGATIVE MG/DL
LEUKOCYTE ESTERASE UR QL STRIP: ABNORMAL
NITRATE UR QL: NEGATIVE
PH UR STRIP: 5.5 PH (ref 5–7)
RBC #/AREA URNS AUTO: ABNORMAL /HPF
SOURCE: ABNORMAL
SP GR UR STRIP: 1.02 (ref 1–1.03)
UROBILINOGEN UR STRIP-ACNC: 0.2 EU/DL (ref 0.2–1)
WBC #/AREA URNS AUTO: ABNORMAL /HPF

## 2019-01-03 PROCEDURE — 81001 URINALYSIS AUTO W/SCOPE: CPT | Performed by: FAMILY MEDICINE

## 2019-01-04 ENCOUNTER — TELEPHONE (OUTPATIENT)
Dept: FAMILY MEDICINE | Facility: CLINIC | Age: 64
End: 2019-01-04

## 2019-01-04 NOTE — TELEPHONE ENCOUNTER
Our goal is to have forms completed within 72 hours, however some forms may require a visit or additional information.    What clinic location was the form placed at [facility] or [place].?     Who is the form from?   Where did the form come from? Faxed to clinic   The form was placed in the inbox of Carolina Pulliam MD      Please fax to [phone]  Phone number: [phone]    Additional comments: [facility] - OT 1 week 3    Call take on 1/4/2019 at 3:05 PM by Omega Soriano

## 2019-01-09 ENCOUNTER — TELEPHONE (OUTPATIENT)
Dept: FAMILY MEDICINE | Facility: CLINIC | Age: 64
End: 2019-01-09

## 2019-01-09 DIAGNOSIS — Z53.9 DIAGNOSIS NOT YET DEFINED: Primary | ICD-10-CM

## 2019-01-09 DIAGNOSIS — N39.0 FREQUENT UTI: Primary | ICD-10-CM

## 2019-01-09 DIAGNOSIS — R30.0 DYSURIA: ICD-10-CM

## 2019-01-09 LAB
ALBUMIN UR-MCNC: NEGATIVE MG/DL
APPEARANCE UR: CLEAR
BILIRUB UR QL STRIP: NEGATIVE
COLOR UR AUTO: YELLOW
GLUCOSE UR STRIP-MCNC: NEGATIVE MG/DL
HGB UR QL STRIP: NEGATIVE
KETONES UR STRIP-MCNC: NEGATIVE MG/DL
LEUKOCYTE ESTERASE UR QL STRIP: ABNORMAL
NITRATE UR QL: NEGATIVE
PH UR STRIP: 5.5 PH (ref 5–7)
SOURCE: ABNORMAL
SP GR UR STRIP: 1.02 (ref 1–1.03)
UROBILINOGEN UR STRIP-ACNC: 0.2 EU/DL (ref 0.2–1)

## 2019-01-09 PROCEDURE — 81001 URINALYSIS AUTO W/SCOPE: CPT | Performed by: FAMILY MEDICINE

## 2019-01-09 PROCEDURE — G0180 MD CERTIFICATION HHA PATIENT: HCPCS | Performed by: FAMILY MEDICINE

## 2019-01-09 NOTE — TELEPHONE ENCOUNTER
Reason for Call:  Request for results:    Name of test or procedure: UA    Date of test of procedure: 1-3-19    Location of the test or procedure: [facility]    OK to leave the result message on voice mail or with a family member? YES    Phone number Patient can be reached at:  [phone]    Additional comments: WOULD LIKE CALL WITH RESULTS TODAY.    Call taken on 1/9/2019 at 3:43 PM by ERYN BRIDGES

## 2019-01-11 ENCOUNTER — TELEPHONE (OUTPATIENT)
Dept: ONCOLOGY | Facility: CLINIC | Age: 64
End: 2019-01-11

## 2019-01-11 NOTE — TELEPHONE ENCOUNTER
I contacted the patient by phone regarding her upcoming appointment for [facility] on Monday 1/14/19. I reminded the patient to take her hydroxyzine and ranitidine starting on Sunday as directed by Leyda Beach NP. I confirmed that she is taking valacyclovir as ordered. The patient reports that she never needed meperidine last year so she was opting not to fill the prescription, but will bring the paper script just in case. The patient's  will bring her to the appointment and will pick her up after the infusion. I told the patient to bring any medications that she is currently taking that she might need during the day.     The patient verbalized understanding.     Jerry Galindo PharmD.

## 2019-01-11 NOTE — TELEPHONE ENCOUNTER
Pt was informed of providers message below. She is requesting a call when culture results are available.

## 2019-01-11 NOTE — TELEPHONE ENCOUNTER
No obvious Urinary Tract Infection - still waiting for culture results.  Can you call and let them know?  Dr. Carolina Pulliam MD/[facility]

## 2019-01-14 ENCOUNTER — INFUSION THERAPY VISIT (OUTPATIENT)
Dept: INFUSION THERAPY | Facility: CLINIC | Age: 64
End: 2019-01-14
Attending: PSYCHIATRY & NEUROLOGY
Payer: MEDICARE

## 2019-01-14 VITALS
RESPIRATION RATE: 16 BRPM | DIASTOLIC BLOOD PRESSURE: 69 MMHG | HEART RATE: 108 BPM | SYSTOLIC BLOOD PRESSURE: 107 MMHG | TEMPERATURE: 99.1 F

## 2019-01-14 DIAGNOSIS — G35 MS (MULTIPLE SCLEROSIS) (H): Primary | ICD-10-CM

## 2019-01-14 LAB
NON-SQ EPI CELLS #/AREA URNS LPF: ABNORMAL /LPF
RBC #/AREA URNS AUTO: ABNORMAL /HPF
WBC #/AREA URNS AUTO: ABNORMAL /HPF

## 2019-01-14 PROCEDURE — 96415 CHEMO IV INFUSION ADDL HR: CPT

## 2019-01-14 PROCEDURE — 96413 CHEMO IV INFUSION 1 HR: CPT

## 2019-01-14 PROCEDURE — 96367 TX/PROPH/DG ADDL SEQ IV INF: CPT

## 2019-01-14 PROCEDURE — 25000128 H RX IP 250 OP 636: Performed by: INTERNAL MEDICINE

## 2019-01-14 RX ORDER — MEPERIDINE HYDROCHLORIDE 50 MG/1
50 TABLET ORAL ONCE
Status: CANCELLED
Start: 2019-01-14 | End: 2019-01-14

## 2019-01-14 RX ORDER — MEPERIDINE HYDROCHLORIDE 50 MG/1
50 TABLET ORAL EVERY 4 HOURS PRN
Status: CANCELLED
Start: 2019-01-14

## 2019-01-14 RX ORDER — ACETAMINOPHEN 500 MG
1000 TABLET ORAL EVERY 4 HOURS PRN
Status: CANCELLED | OUTPATIENT
Start: 2019-01-14

## 2019-01-14 RX ORDER — PROMETHAZINE HYDROCHLORIDE 25 MG/ML
25 INJECTION, SOLUTION INTRAMUSCULAR; INTRAVENOUS
Status: CANCELLED
Start: 2019-01-14

## 2019-01-14 RX ORDER — DIPHENHYDRAMINE HCL 25 MG
25 CAPSULE ORAL ONCE
Status: CANCELLED | OUTPATIENT
Start: 2019-01-14 | End: 2019-01-14

## 2019-01-14 RX ORDER — EPINEPHRINE 0.3 MG/.3ML
0.3 INJECTION SUBCUTANEOUS
Status: CANCELLED | OUTPATIENT
Start: 2019-01-14

## 2019-01-14 RX ORDER — DIPHENHYDRAMINE HYDROCHLORIDE 50 MG/ML
25 INJECTION INTRAMUSCULAR; INTRAVENOUS EVERY 6 HOURS PRN
Status: CANCELLED | OUTPATIENT
Start: 2019-01-14

## 2019-01-14 RX ORDER — ACETAMINOPHEN 500 MG
1000 TABLET ORAL ONCE
Status: CANCELLED | OUTPATIENT
Start: 2019-01-14

## 2019-01-14 RX ORDER — HYDROXYZINE PAMOATE 50 MG/1
50 CAPSULE ORAL ONCE
Status: CANCELLED
Start: 2019-01-14 | End: 2019-01-14

## 2019-01-14 RX ORDER — IBUPROFEN 200 MG
800 TABLET ORAL EVERY 6 HOURS PRN
Status: CANCELLED | OUTPATIENT
Start: 2019-01-14

## 2019-01-14 RX ORDER — HYDROXYZINE PAMOATE 50 MG/1
50 CAPSULE ORAL 3 TIMES DAILY PRN
Status: CANCELLED
Start: 2019-01-14

## 2019-01-14 RX ORDER — HYDROXYZINE PAMOATE 50 MG/1
50 CAPSULE ORAL
COMMUNITY
End: 2019-04-27

## 2019-01-14 RX ADMIN — RENAGEL 12 MG: 400 TABLET ORAL at 11:51

## 2019-01-14 RX ADMIN — SODIUM CHLORIDE 1000 MG: 9 INJECTION, SOLUTION INTRAVENOUS at 10:48

## 2019-01-14 NOTE — PROGRESS NOTES
Lemtrada Infusion Nursing Note:  Marylee Woods presents today for lemtrada 1 of 3.    Patient seen by provider today: No   present during visit today: Not Applicable.    Note: Pt did not take zantac yesterday. Contacted Dr. Redman's office, given verbal order ok to treat. Pt arrived today in a wheel chair, has a broken leg and is non weightbearing and needs sliding board assistance to transfer. Pt informed that she will need a family member present to assist with transfers and toileting as clinic staff are not trained or equipped to assist..  Patient had taken tylenol prior to admission; held this pre med. Patient also was told she was only to take Demerol for pain; patient did not bring this med with. This premed not given either.    Intravenous Access:  Peripheral IV placed. Difficult IV start.    Pre infusion Assessment:    1. Is patient authorized and have they received the Lemtrada treatment and infusion reactions patient guide? YES.  2. If ordered, has patient taken pre-medications? NO. Discussed with Dr. Redman, ok to proceed with treatment despite not taking zantac.  3. Does patient have any of the following signs of infection: fever, cough, sore throat, wounds, UTI symptoms? NO.  4. Is patient taking any oral, IV or topical anti-infective medication? YES - macrodantin for UTI - just preventative  5. Has patient been in contact with anyone with TB? NO.  6. Has patient traveled outside the country in the last 21 days? NO.  7. Has patient had a flu shot or vaccine in the last 6 weeks? NO  8. Plan~ A. Therapy is appropriate, will proceed with treatment today?                                        YES.      B. Medication will be held today and patient will be re-assessed at a later                      Date: Not Applicable.    Lemtrada - REMS requirements:    9. Lemtrada REMS infusion checklist reviewed and proper information documented: YES.  10. Lemtrada REMS infusion checklist completed online  daily and submitted after FINAL infusion: Not Applicable.  11. Post-infusion instructions reviewed and given to patient: YES.  15. Adverse reactions reported to: N/A.      Post Infusion Assessment:  Patient tolerated infusion without incident.  Patient observed for 120 minutes post infusion per protocol.  Site patent and intact, free from redness, edema or discomfort.  No evidence of extravasations.    Discharge Plan:   Patient and/or family verbalized understanding of discharge instructions and all questions answered.  AVS to patient via [employer].  Patient will return tomorrow for next appointment.   Patient discharged in stable condition accompanied by: .  Departure Mode: Wheelchair.    Fernando Porter RN//Letha Melo RN

## 2019-01-14 NOTE — TELEPHONE ENCOUNTER
Doesn't appear UC was ordered, didn't reflex based on results. Checked with lab staff and sample too old to add-on today. Will forward to PCP for review to see if she'd like patient to leave another sample.

## 2019-01-14 NOTE — PROGRESS NOTES
Infusion Nursing Note:  Marylee Woods presents today for Lemtrada.    Patient seen by provider today: No   present during visit today: Not Applicable.    Note: Pt did not take zantac yesterday. Contacted Dr. Redman's office, given verbal order ok to treat. Pt arrived today in a wheel chair, has a broken leg and is non weightbearing and needs sliding board assistance to transfer. Pt informed that she will need a family member present to assist with transfers and toileting as clinic staff are not trained or equipped to assist.    Intravenous Access:  Peripheral IV placed. Difficult IV start.    Treatment Conditions:  {UMHTXCONDITIONS:220107}      Post Infusion Assessment:  {UMHPOSTINFUSION:376009}    Discharge Plan:   {UMHDISCHARGE:655970}    Letha Melo RN

## 2019-01-14 NOTE — TELEPHONE ENCOUNTER
Pt was called. She will be willing to bring new urine sample. Please review message and advice on culture order.

## 2019-01-14 NOTE — PATIENT INSTRUCTIONS
Thank you for choosing [facility]. The following information is a summary of your appointment as well as important reminders:      Last dose Tylenol was none today   Last dose Ibuprofen was none today.  Last dose Benadryl was none today.    Care for yourself after your infusions:  1.  Notify your doctor or seek appropriate medical attention as soon as possible if you experience any discomfort or anything that feels out of the ordinary--including swelling in your mouth or throat, trouble breathing, weakness; fast, slow or irregular heartbeat, chest pain or rash.    2.  Please continue all post infusion medications as prescribed by your healthcare provider.  3.  Stay hydrated.  Be sure to drink plenty of water.  4.  Rest.  You may feel tired, so take it easy and don't overexert yourself.  5.  Eat your regular meals and usual portion sizes as tolerated.   6.  Be mindful of infections and ways to help reduce your risk.      We look forward in seeing you on your next appointment here at [facility].  Please don t hesitate to call us at [phone] to reschedule any of your appointments or to speak with one of our registered nurses.  It was a pleasure taking care of you today.    Sincerely,      [facility]  [facility]  [address]  [place] [address]  Phone: [phone]

## 2019-01-15 ENCOUNTER — INFUSION THERAPY VISIT (OUTPATIENT)
Dept: INFUSION THERAPY | Facility: CLINIC | Age: 64
End: 2019-01-15
Attending: PSYCHIATRY & NEUROLOGY
Payer: MEDICARE

## 2019-01-15 VITALS
RESPIRATION RATE: 16 BRPM | SYSTOLIC BLOOD PRESSURE: 131 MMHG | DIASTOLIC BLOOD PRESSURE: 73 MMHG | TEMPERATURE: 98.1 F | HEART RATE: 90 BPM

## 2019-01-15 DIAGNOSIS — G35 MS (MULTIPLE SCLEROSIS) (H): Primary | ICD-10-CM

## 2019-01-15 PROCEDURE — 96415 CHEMO IV INFUSION ADDL HR: CPT

## 2019-01-15 PROCEDURE — 96367 TX/PROPH/DG ADDL SEQ IV INF: CPT

## 2019-01-15 PROCEDURE — 25000128 H RX IP 250 OP 636: Performed by: INTERNAL MEDICINE

## 2019-01-15 PROCEDURE — 96413 CHEMO IV INFUSION 1 HR: CPT

## 2019-01-15 RX ORDER — ACETAMINOPHEN 500 MG
1000 TABLET ORAL ONCE
Status: CANCELLED | OUTPATIENT
Start: 2019-01-15

## 2019-01-15 RX ORDER — ACETAMINOPHEN 500 MG
1000 TABLET ORAL EVERY 4 HOURS PRN
Status: CANCELLED | OUTPATIENT
Start: 2019-01-15

## 2019-01-15 RX ORDER — EPINEPHRINE 0.3 MG/.3ML
0.3 INJECTION SUBCUTANEOUS
Status: CANCELLED | OUTPATIENT
Start: 2019-01-15

## 2019-01-15 RX ORDER — PROMETHAZINE HYDROCHLORIDE 25 MG/ML
25 INJECTION, SOLUTION INTRAMUSCULAR; INTRAVENOUS
Status: CANCELLED
Start: 2019-01-15

## 2019-01-15 RX ORDER — MEPERIDINE HYDROCHLORIDE 50 MG/1
50 TABLET ORAL EVERY 4 HOURS PRN
Status: CANCELLED
Start: 2019-01-15

## 2019-01-15 RX ORDER — MEPERIDINE HYDROCHLORIDE 50 MG/1
50 TABLET ORAL ONCE
Status: CANCELLED
Start: 2019-01-15 | End: 2019-01-15

## 2019-01-15 RX ORDER — DIPHENHYDRAMINE HYDROCHLORIDE 50 MG/ML
25 INJECTION INTRAMUSCULAR; INTRAVENOUS EVERY 6 HOURS PRN
Status: CANCELLED | OUTPATIENT
Start: 2019-01-15

## 2019-01-15 RX ORDER — HYDROXYZINE PAMOATE 50 MG/1
50 CAPSULE ORAL 3 TIMES DAILY PRN
Status: CANCELLED
Start: 2019-01-15

## 2019-01-15 RX ORDER — HYDROXYZINE PAMOATE 50 MG/1
50 CAPSULE ORAL ONCE
Status: CANCELLED
Start: 2019-01-15 | End: 2019-01-15

## 2019-01-15 RX ORDER — IBUPROFEN 200 MG
800 TABLET ORAL EVERY 6 HOURS PRN
Status: CANCELLED | OUTPATIENT
Start: 2019-01-15

## 2019-01-15 RX ORDER — DIPHENHYDRAMINE HCL 25 MG
25 CAPSULE ORAL ONCE
Status: CANCELLED | OUTPATIENT
Start: 2019-01-15 | End: 2019-01-15

## 2019-01-15 RX ADMIN — SODIUM CHLORIDE 1000 MG: 9 INJECTION, SOLUTION INTRAVENOUS at 08:49

## 2019-01-15 RX ADMIN — RENAGEL 12 MG: 400 TABLET ORAL at 09:57

## 2019-01-15 ASSESSMENT — PAIN SCALES - GENERAL
PAINLEVEL: NO PAIN (0)
PAINLEVEL: NO PAIN (0)

## 2019-01-15 NOTE — RESULT ENCOUNTER NOTE
Dear Marylee,  These results are in an acceptable/normal range.  I recommend no change to your plan of care - please let me know if you have any questions or concerns.  Sincerely,  Dr. Carolina Pulliam MD  [facility]  [phone]

## 2019-01-15 NOTE — TELEPHONE ENCOUNTER
I ordered urine culture. Thanks.  Dr. Carolina Pulliam MD/[facility]

## 2019-01-15 NOTE — TELEPHONE ENCOUNTER
Information to pt.  She has infusion today and tomorrow so she is planning on doing it on thursday

## 2019-01-15 NOTE — PATIENT INSTRUCTIONS
Thank you for choosing [facility]. The following information is a summary of your appointment as well as important reminders:      Last dose Tylenol was none given today.  Last dose Ibuprofen was none given today.  Last dose Benadryl was none given today.    Care for yourself after your infusions:  1.  Notify your doctor or seek appropriate medical attention as soon as possible if you experience any discomfort or anything that feels out of the ordinary--including swelling in your mouth or throat, trouble breathing, weakness; fast, slow or irregular heartbeat, chest pain or rash.    2.  Please continue all post infusion medications as prescribed by your healthcare provider.  3.  Stay hydrated.  Be sure to drink plenty of water.  4.  Rest.  You may feel tired, so take it easy and don't overexert yourself.  5.  Eat your regular meals and usual portion sizes as tolerated.   6.  Be mindful of infections and ways to help reduce your risk.      We look forward in seeing you on your next appointment here at [facility].  Please don t hesitate to call us at [phone] to reschedule any of your appointments or to speak with one of our registered nurses.  It was a pleasure taking care of you today.    Sincerely,    [facility]  [facility]  [address] [place] [id number]  Phone: [phone]

## 2019-01-15 NOTE — PROGRESS NOTES
[facility] Nursing Note:  Marylee Woods presents today for lemtrada day 2/3.    Patient seen by provider today: No   present during visit today: Not Applicable.    Note: Oral premeds were taken by patient, including tylenol.  Intravenous Access:  Peripheral IV previously placed.    Pre infusion Assessment:    1. Is patient authorized and have they received the [facility] treatment and infusion reactions patient guide? YES.  2. If ordered, has patient taken pre-medications? YES.  3. Does patient have any of the following signs of infection: fever, cough, sore throat, wounds, UTI symptoms? NO.  4. Is patient taking any oral, IV or topical anti-infective medication? YES - preventative.  5. Has patient been in contact with anyone with TB? NO.  6. Has patient traveled outside the country in the last 21 days? NO.  7. Has patient had a flu shot or vaccine in the last 6 weeks? NO  8. Plan~ A. Therapy is appropriate, will proceed with treatment today?                                        YES.      B. Medication will be held today and patient will be re-assessed at a later                      Date: Not Applicable.    [facility] - REMS requirements:    9. [facility] REMS infusion checklist reviewed and proper information documented: YES.  10. [facility] REMS infusion checklist completed online daily and submitted after FINAL infusion: YES.  11. Post-infusion instructions reviewed and given to patient: YES.  15. Adverse reactions reported to: N/A.      Post Infusion Assessment:  Patient tolerated infusion without incident.  Patient observed for 120 minutes post lemtrada per protocol.  Site patent and intact, free from redness, edema or discomfort.  No evidence of extravasations.    Discharge Plan:   Copy of AVS reviewed with patient and/or family.  Patient will return 1/16 for next appointment.  Patient discharged in stable condition accompanied by: .  Departure Mode: Wheelchair.    Fernando Porter,  RN

## 2019-01-16 ENCOUNTER — INFUSION THERAPY VISIT (OUTPATIENT)
Dept: INFUSION THERAPY | Facility: CLINIC | Age: 64
End: 2019-01-16
Attending: PSYCHIATRY & NEUROLOGY
Payer: MEDICARE

## 2019-01-16 VITALS
HEART RATE: 79 BPM | TEMPERATURE: 98.4 F | RESPIRATION RATE: 16 BRPM | SYSTOLIC BLOOD PRESSURE: 156 MMHG | OXYGEN SATURATION: 96 % | DIASTOLIC BLOOD PRESSURE: 74 MMHG

## 2019-01-16 DIAGNOSIS — G35 MS (MULTIPLE SCLEROSIS) (H): Primary | ICD-10-CM

## 2019-01-16 PROCEDURE — 96413 CHEMO IV INFUSION 1 HR: CPT

## 2019-01-16 PROCEDURE — 96367 TX/PROPH/DG ADDL SEQ IV INF: CPT

## 2019-01-16 PROCEDURE — 25000128 H RX IP 250 OP 636: Performed by: INTERNAL MEDICINE

## 2019-01-16 PROCEDURE — 96415 CHEMO IV INFUSION ADDL HR: CPT

## 2019-01-16 RX ORDER — ACETAMINOPHEN 500 MG
1000 TABLET ORAL EVERY 4 HOURS PRN
Status: CANCELLED | OUTPATIENT
Start: 2019-01-16

## 2019-01-16 RX ORDER — DIPHENHYDRAMINE HYDROCHLORIDE 50 MG/ML
25 INJECTION INTRAMUSCULAR; INTRAVENOUS EVERY 6 HOURS PRN
Status: CANCELLED | OUTPATIENT
Start: 2019-01-16

## 2019-01-16 RX ORDER — MEPERIDINE HYDROCHLORIDE 50 MG/1
50 TABLET ORAL EVERY 4 HOURS PRN
Status: CANCELLED
Start: 2019-01-16

## 2019-01-16 RX ORDER — MEPERIDINE HYDROCHLORIDE 50 MG/1
50 TABLET ORAL ONCE
Status: CANCELLED
Start: 2019-01-16 | End: 2019-01-16

## 2019-01-16 RX ORDER — HYDROXYZINE PAMOATE 50 MG/1
50 CAPSULE ORAL ONCE
Status: CANCELLED
Start: 2019-01-16 | End: 2019-01-16

## 2019-01-16 RX ORDER — ACETAMINOPHEN 500 MG
1000 TABLET ORAL ONCE
Status: CANCELLED | OUTPATIENT
Start: 2019-01-16

## 2019-01-16 RX ORDER — IBUPROFEN 200 MG
800 TABLET ORAL EVERY 6 HOURS PRN
Status: CANCELLED | OUTPATIENT
Start: 2019-01-16

## 2019-01-16 RX ORDER — HYDROXYZINE PAMOATE 50 MG/1
50 CAPSULE ORAL 3 TIMES DAILY PRN
Status: CANCELLED
Start: 2019-01-16

## 2019-01-16 RX ORDER — PROMETHAZINE HYDROCHLORIDE 25 MG/ML
25 INJECTION, SOLUTION INTRAMUSCULAR; INTRAVENOUS
Status: CANCELLED
Start: 2019-01-16

## 2019-01-16 RX ORDER — EPINEPHRINE 0.3 MG/.3ML
0.3 INJECTION SUBCUTANEOUS
Status: CANCELLED | OUTPATIENT
Start: 2019-01-16

## 2019-01-16 RX ORDER — DIPHENHYDRAMINE HCL 25 MG
25 CAPSULE ORAL ONCE
Status: CANCELLED | OUTPATIENT
Start: 2019-01-16 | End: 2019-01-16

## 2019-01-16 RX ADMIN — RENAGEL 12 MG: 400 TABLET ORAL at 09:07

## 2019-01-16 RX ADMIN — SODIUM CHLORIDE 1000 MG: 9 INJECTION, SOLUTION INTRAVENOUS at 07:51

## 2019-01-16 ASSESSMENT — PAIN SCALES - GENERAL: PAINLEVEL: NO PAIN (0)

## 2019-01-16 NOTE — PROGRESS NOTES
Marylee Woods presents today for LEmtrada 3/3.    Patient seen by provider today: No   present during visit today: Not Applicable.    Note: N/A.    Intravenous Access:  Peripheral IV placed 1/14/19    Pre infusion Assessment:    1. Is patient authorized and have they received the [facility] treatment and infusion reactions patient guide? YES.  2. If ordered, has patient taken pre-medications? YES.  3. Does patient have any of the following signs of infection: fever, cough, sore throat, wounds, UTI symptoms? NO.  4. Is patient taking any oral, IV or topical anti-infective medication? NO.  5. Has patient been in contact with anyone with TB? NO.  6. Has patient traveled outside the country in the last 21 days? NO.  7. Has patient had a flu shot or vaccine in the last 6 weeks? NO  8. Plan~ A. Therapy is appropriate, will proceed with treatment today?                                        YES.      B. Medication will be held today and patient will be re-assessed at a later                      Date: NO.    [facility] - REMS requirements:    9. [facility] REMS infusion checklist reviewed and proper information documented: YES.  10. [facility] REMS infusion checklist completed online daily and submitted after FINAL infusion: YES.  11. Post-infusion instructions reviewed and given to patient: YES.  15. Adverse reactions reported to: N/A.      Post Infusion Assessment:  Patient tolerated infusion without incident. Observed for 120 min per [facility] protocol.  Blood return noted pre and post infusion.  Site patent and intact, free from redness, edema or discomfort.  No evidence of extravasations.  Access discontinued per protocol.    Discharge Plan:   Discharge instructions reviewed with: Patient and Family.  Patient and/or family verbalized understanding of discharge instructions and all questions answered.  Patient discharged in stable condition accompanied by: self and .  Departure Mode: Wheelchair.    Carolina BRANHAM  Taylor RN

## 2019-01-17 DIAGNOSIS — R30.0 DYSURIA: ICD-10-CM

## 2019-01-17 LAB
ALBUMIN UR-MCNC: NEGATIVE MG/DL
APPEARANCE UR: CLEAR
BILIRUB UR QL STRIP: NEGATIVE
COLOR UR AUTO: YELLOW
GLUCOSE UR STRIP-MCNC: NEGATIVE MG/DL
HGB UR QL STRIP: NEGATIVE
KETONES UR STRIP-MCNC: NEGATIVE MG/DL
LEUKOCYTE ESTERASE UR QL STRIP: NEGATIVE
NITRATE UR QL: NEGATIVE
PH UR STRIP: 7 PH (ref 5–7)
SOURCE: NORMAL
SP GR UR STRIP: 1.02 (ref 1–1.03)
UROBILINOGEN UR STRIP-ACNC: 0.2 EU/DL (ref 0.2–1)

## 2019-01-17 PROCEDURE — 81003 URINALYSIS AUTO W/O SCOPE: CPT | Performed by: FAMILY MEDICINE

## 2019-01-18 NOTE — RESULT ENCOUNTER NOTE
Dear Marylee,  I have reviewed your results, and they are all in an acceptable/normal range.  At this point I recommend no change to your plan of care - please let me know if you have any questions or concerns.  Sincerely,  Dr. Carolina Pulliam MD    Thank you for choosing the [facility] as your health care provider. Please don't hesitate to call with any questions or concerns [phone]. done

## 2019-01-21 DIAGNOSIS — M79.605 LEFT LEG PAIN: Primary | ICD-10-CM

## 2019-01-24 ENCOUNTER — TELEPHONE (OUTPATIENT)
Dept: ORTHOPEDICS | Facility: CLINIC | Age: 64
End: 2019-01-24

## 2019-01-24 NOTE — TELEPHONE ENCOUNTER
[facility]    Phone Message    May a detailed message be left on voicemail: no    Reason for Call: Other: Patient is requesting a xray for her ankle on 1/25/2019 she says that she is still having pain. Thank you.     Action Taken: Message routed to:  [facility] ([facility] Ortho

## 2019-01-25 ENCOUNTER — ANCILLARY PROCEDURE (OUTPATIENT)
Dept: GENERAL RADIOLOGY | Facility: CLINIC | Age: 64
End: 2019-01-25
Payer: COMMERCIAL

## 2019-01-25 ENCOUNTER — OFFICE VISIT (OUTPATIENT)
Dept: ORTHOPEDICS | Facility: CLINIC | Age: 64
End: 2019-01-25
Payer: COMMERCIAL

## 2019-01-25 VITALS — HEIGHT: 69 IN | BODY MASS INDEX: 19.26 KG/M2 | WEIGHT: 130 LBS

## 2019-01-25 DIAGNOSIS — S82.892A CLOSED FRACTURE OF LEFT ANKLE, INITIAL ENCOUNTER: Primary | ICD-10-CM

## 2019-01-25 ASSESSMENT — ENCOUNTER SYMPTOMS
MEMORY LOSS: 0
WEIGHT LOSS: 0
BRUISES/BLEEDS EASILY: 0
POLYPHAGIA: 0
MUSCLE CRAMPS: 0
DIZZINESS: 1
DISTURBANCES IN COORDINATION: 1
SPEECH CHANGE: 0
WEAKNESS: 1
FATIGUE: 1
NECK MASS: 0
FEVER: 1
POLYDIPSIA: 0
PARALYSIS: 0
EYE IRRITATION: 1
CHILLS: 1
TROUBLE SWALLOWING: 1
WEIGHT GAIN: 0
NUMBNESS: 1
SMELL DISTURBANCE: 0
MYALGIAS: 1
SEIZURES: 0
EYE REDNESS: 0
NIGHT SWEATS: 0
ARTHRALGIAS: 1
MUSCLE WEAKNESS: 1
HOARSE VOICE: 1
EYE WATERING: 0
DECREASED APPETITE: 0
ALTERED TEMPERATURE REGULATION: 1
TASTE DISTURBANCE: 0
SWOLLEN GLANDS: 1
BACK PAIN: 1
SINUS CONGESTION: 1
TREMORS: 0
JOINT SWELLING: 1
HALLUCINATIONS: 0
HEADACHES: 1
SORE THROAT: 1
LOSS OF CONSCIOUSNESS: 0
DOUBLE VISION: 0
NECK PAIN: 1
STIFFNESS: 0
INCREASED ENERGY: 1
EYE PAIN: 1
SINUS PAIN: 1
TINGLING: 1

## 2019-01-25 ASSESSMENT — MIFFLIN-ST. JEOR: SCORE: 1205.09

## 2019-01-25 NOTE — LETTER
1/25/2019       RE: Marylee Woods  [address]  [facility] [address]     Dear Colleague,    Thank you for referring your patient, Marylee Woods, to the [facility]. Please see a copy of my visit note below.    Spine Surgery Return Clinic Visit      Chief Complaint:   Surgical Followup (left femur fracture )      Interval HPI:  Symptom Profile Including: location of symptoms, onset, severity, exacerbating/alleviating factors, previous treatments:        Marylee Woods is a 63 year old female who returns now about 3 months status post left distal femur fracture open reduction internal fixation.  She is doing relatively well.  Minimal knee pain at this time.  She has been nonweightbearing doing some knee range of motion.  She has significant disability from before her operation due to multiple sclerosis and she had a left foot drop before the surgery.  She is always had ankle pain after the operation there was some concern over a nondisplaced fracture and she had been in a boot.  She still having some tenderness over the lateral ankle there.            Past Medical History:     Past Medical History:   Diagnosis Date     Gastro-oesophageal reflux disease      Multiple sclerosis (H)             Past Surgical History:     Past Surgical History:   Procedure Laterality Date     C/SECTION, LOW TRANSVERSE  1992     COLONOSCOPY  2007     COLONOSCOPY N/A 1/26/2017    Procedure: COMBINED COLONOSCOPY, SINGLE OR MULTIPLE BIOPSY/POLYPECTOMY BY BIOPSY;  Surgeon: Mayo Adkins MD, MD;  Location:  GI     ESOPHAGOSCOPY, GASTROSCOPY, DUODENOSCOPY (EGD), COMBINED  1/26/2017    Dr. Adkins [facility]     ESOPHAGOSCOPY, GASTROSCOPY, DUODENOSCOPY (EGD), COMBINED N/A 1/26/2017    Procedure: COMBINED ESOPHAGOSCOPY, GASTROSCOPY, DUODENOSCOPY (EGD), BIOPSY SINGLE OR MULTIPLE;  Surgeon: Mayo Adkins MD, MD;  Location:  GI     HIP SURGERY  2009    femur ortho surgery      LAPAROSCOPIC CHOLECYSTECTOMY  6/24/2014    Procedure: LAPAROSCOPIC CHOLECYSTECTOMY;  Surgeon: Randy Bailey MD;  Location:  [facility]     OPEN REDUCTION INTERNAL FIXATION FEMUR DISTAL Left 11/1/2018    Procedure: OPEN REDUCTION INTERNAL FIXATION LEFT FEMUR DISTAL;  Surgeon: Jose Valadez MD;  [facility]: [facility]     OPEN REDUCTION INTERNAL FIXATION RODDING INTRAMEDULLARY TIBIA  4/14/2013    Procedure: OPEN REDUCTION INTERNAL FIXATION RODDING INTRAMEDULLARY TIBIA;;  Surgeon: Sajan Cast MD;  [facility]: [facility]     ORTHOPEDIC SURGERY  2009    surgery right upper femur fx near hip            Social History:     Social History     Tobacco Use     Smoking status: Current Every Day Smoker     Packs/day: 0.25     Types: Cigarettes     Smokeless tobacco: Former User     Quit date: 12/16/2017   Substance Use Topics     Alcohol use: Yes     Alcohol/week: 0.0 oz     Comment: 1/wk            Family History:     Family History   Problem Relation Age of Onset     Heart Disease Maternal Grandmother      Cancer Maternal Grandfather      Heart Disease Paternal Grandfather      Heart Disease Mother      Heart Disease Father      Diabetes No family hx of      Coronary Artery Disease No family hx of      Hypertension No family hx of      Hyperlipidemia No family hx of      Cerebrovascular Disease No family hx of      Breast Cancer No family hx of      Colon Cancer No family hx of      Prostate Cancer No family hx of      Other Cancer No family hx of      Depression No family hx of      Anxiety Disorder No family hx of      Mental Illness No family hx of      Substance Abuse No family hx of      Anesthesia Reaction No family hx of      Asthma No family hx of      Osteoporosis No family hx of      Genetic Disorder No family hx of      Thyroid Disease No family hx of      Obesity No family hx of      Unknown/Adopted No family hx of             Allergies:     Allergies   Allergen Reactions     Amantadine      hallucinations  "    Budesonide      Symbicort Rash            Medications:     Current Outpatient Medications   Medication     acetaminophen (TYLENOL) 325 MG tablet     baclofen 5 MG TABS     baclofen 5 MG TABS     baclofen 5 MG TABS     buPROPion (WELLBUTRIN XL) 150 MG 24 hr tablet     cholecalciferol (VITAMIN D3) 5000 units CAPS capsule     ferrous sulfate (IRON) 325 (65 Fe) MG tablet     gabapentin (NEURONTIN) 300 MG capsule     gabapentin (NEURONTIN) 300 MG capsule     hydrOXYzine (VISTARIL) 50 MG capsule     multivitamin, therapeutic with minerals (THERA-VIT-M) TABS     NICOTROL 10 MG inhaler     nitroFURantoin, macrocrystal-monohydrate, (MACROBID) 100 MG capsule     omeprazole (PRILOSEC) 40 MG capsule     oxybutynin (DITROPAN-XL) 10 MG 24 hr tablet     polyethylene glycol (MIRALAX/GLYCOLAX) Packet     ranitidine (ZANTAC) 150 MG capsule     sertraline (ZOLOFT) 100 MG tablet     valACYclovir (VALTREX) 500 MG tablet     No current facility-administered medications for this visit.              Review of Systems:   A focused musculoskeletal and neurologic ROS was performed with pertinent positives and negatives noted in the HPI.  Additional systems were also reviewed and are documented at the bottom of the note.         Physical Exam:   Vitals: Ht 1.746 m (5' 8.75\")   Wt 59 kg (130 lb)   LMP  (LMP Unknown)   BMI 19.34 kg/m     Musculoskeletal, Neurologic, and Spine:     I examined the incisions and they all look well-healed.  She has knee range of motion from about  degrees.  Moderate knee swelling.  She is tender over the lateral malleolus of the ankle.  Known left foot drop is unchanged.         Imaging:   We ordered and independently reviewed new radiographs at this clinic visit. The results were discussed with the patient. Findings include:     AP and lateral left femur films January 25, 2019 show well-positioned implants with no evidence of interval complication.  I compared this against the December 24 films and it " looks like there is been some interval callus formation in the interim     AP lateral and mortise views of the left ankle January 25, 2019.  I cannot appreciate any fracture on these films       Assessment and Plan:     63 year old female with healing left distal femur fracture.    At this point I think she can start to advance her weightbearing.  I told her about 30 pounds partial weightbearing with a walker.  Like to get her into therapy so they can help her with this.  For her left ankle pain I told her we should also start some therapy for this.  If it does not improve my suspicion would be that she has a ligamentous injury and I would probably send her to 1 of our ankle doctors to get an opinion.  Follow-up with me in 8 weeks with repeat AP and lateral left femur films so I can assess the progress with her healing at that time     Respectfully,  Jose Valadez MD  Spine Surgery  [facility]

## 2019-01-25 NOTE — PROGRESS NOTES
Spine Surgery Return Clinic Visit      Chief Complaint:   Surgical Followup (left femur fracture )      Interval HPI:  Symptom Profile Including: location of symptoms, onset, severity, exacerbating/alleviating factors, previous treatments:        Marylee Woods is a 63 year old female who returns now about 3 months status post left distal femur fracture open reduction internal fixation.  She is doing relatively well.  Minimal knee pain at this time.  She has been nonweightbearing doing some knee range of motion.  She has significant disability from before her operation due to multiple sclerosis and she had a left foot drop before the surgery.  She is always had ankle pain after the operation there was some concern over a nondisplaced fracture and she had been in a boot.  She still having some tenderness over the lateral ankle there.            Past Medical History:     Past Medical History:   Diagnosis Date     Gastro-oesophageal reflux disease      Multiple sclerosis (H)             Past Surgical History:     Past Surgical History:   Procedure Laterality Date     C/SECTION, LOW TRANSVERSE  1992     COLONOSCOPY  2007     COLONOSCOPY N/A 1/26/2017    Procedure: COMBINED COLONOSCOPY, SINGLE OR MULTIPLE BIOPSY/POLYPECTOMY BY BIOPSY;  Surgeon: Mayo Adkins MD, MD;  Location:  GI     ESOPHAGOSCOPY, GASTROSCOPY, DUODENOSCOPY (EGD), COMBINED  1/26/2017    Dr. Adkins [facility]     ESOPHAGOSCOPY, GASTROSCOPY, DUODENOSCOPY (EGD), COMBINED N/A 1/26/2017    Procedure: COMBINED ESOPHAGOSCOPY, GASTROSCOPY, DUODENOSCOPY (EGD), BIOPSY SINGLE OR MULTIPLE;  Surgeon: Mayo Adkins MD, MD;  Location:  GI     HIP SURGERY  2009    femur ortho surgery     LAPAROSCOPIC CHOLECYSTECTOMY  6/24/2014    Procedure: LAPAROSCOPIC CHOLECYSTECTOMY;  Surgeon: Randy Bailey MD;  Location: [facility]     OPEN REDUCTION INTERNAL FIXATION FEMUR DISTAL Left 11/1/2018    Procedure: OPEN REDUCTION INTERNAL FIXATION LEFT FEMUR DISTAL;  Surgeon: Rory  Jose Mccracken MD;  [facility]     OPEN REDUCTION INTERNAL FIXATION RODDING INTRAMEDULLARY TIBIA  4/14/2013    Procedure: OPEN REDUCTION INTERNAL FIXATION RODDING INTRAMEDULLARY TIBIA;;  Surgeon: Sajan Cast MD;  [facility]     ORTHOPEDIC SURGERY  2009    surgery right upper femur fx near hip            Social History:     Social History     Tobacco Use     Smoking status: Current Every Day Smoker     Packs/day: 0.25     Types: Cigarettes     Smokeless tobacco: Former User     Quit date: 12/16/2017   Substance Use Topics     Alcohol use: Yes     Alcohol/week: 0.0 oz     Comment: 1/wk            Family History:     Family History   Problem Relation Age of Onset     Heart Disease Maternal Grandmother      Cancer Maternal Grandfather      Heart Disease Paternal Grandfather      Heart Disease Mother      Heart Disease Father      Diabetes No family hx of      Coronary Artery Disease No family hx of      Hypertension No family hx of      Hyperlipidemia No family hx of      Cerebrovascular Disease No family hx of      Breast Cancer No family hx of      Colon Cancer No family hx of      Prostate Cancer No family hx of      Other Cancer No family hx of      Depression No family hx of      Anxiety Disorder No family hx of      Mental Illness No family hx of      Substance Abuse No family hx of      Anesthesia Reaction No family hx of      Asthma No family hx of      Osteoporosis No family hx of      Genetic Disorder No family hx of      Thyroid Disease No family hx of      Obesity No family hx of      Unknown/Adopted No family hx of             Allergies:     Allergies   Allergen Reactions     Amantadine      hallucinations     Budesonide      Symbicort Rash            Medications:     Current Outpatient Medications   Medication     acetaminophen (TYLENOL) 325 MG tablet     baclofen 5 MG TABS     baclofen 5 MG TABS     baclofen 5 MG TABS     buPROPion (WELLBUTRIN XL) 150 MG 24 hr tablet      "cholecalciferol (VITAMIN D3) 5000 units CAPS capsule     ferrous sulfate (IRON) 325 (65 Fe) MG tablet     gabapentin (NEURONTIN) 300 MG capsule     gabapentin (NEURONTIN) 300 MG capsule     hydrOXYzine (VISTARIL) 50 MG capsule     multivitamin, therapeutic with minerals (THERA-VIT-M) TABS     NICOTROL 10 MG inhaler     nitroFURantoin, macrocrystal-monohydrate, (MACROBID) 100 MG capsule     omeprazole (PRILOSEC) 40 MG capsule     oxybutynin (DITROPAN-XL) 10 MG 24 hr tablet     polyethylene glycol (MIRALAX/GLYCOLAX) Packet     ranitidine (ZANTAC) 150 MG capsule     sertraline (ZOLOFT) 100 MG tablet     valACYclovir (VALTREX) 500 MG tablet     No current facility-administered medications for this visit.              Review of Systems:   A focused musculoskeletal and neurologic ROS was performed with pertinent positives and negatives noted in the HPI.  Additional systems were also reviewed and are documented at the bottom of the note.         Physical Exam:   Vitals: Ht 1.746 m (5' 8.75\")   Wt 59 kg (130 lb)   LMP  (LMP Unknown)   BMI 19.34 kg/m    Musculoskeletal, Neurologic, and Spine:     I examined the incisions and they all look well-healed.  She has knee range of motion from about  degrees.  Moderate knee swelling.  She is tender over the lateral malleolus of the ankle.  Known left foot drop is unchanged.         Imaging:   We ordered and independently reviewed new radiographs at this clinic visit. The results were discussed with the patient. Findings include:     AP and lateral left femur films January 25, 2019 show well-positioned implants with no evidence of interval complication.  I compared this against the December 24 films and it looks like there is been some interval callus formation in the interim     AP lateral and mortise views of the left ankle January 25, 2019.  I cannot appreciate any fracture on these films       Assessment and Plan:     63 year old female with healing left distal femur " fracture.    At this point I think she can start to advance her weightbearing.  I told her about 30 pounds partial weightbearing with a walker.  Like to get her into therapy so they can help her with this.  For her left ankle pain I told her we should also start some therapy for this.  If it does not improve my suspicion would be that she has a ligamentous injury and I would probably send her to 1 of our ankle doctors to get an opinion.  Follow-up with me in 8 weeks with repeat AP and lateral left femur films so I can assess the progress with her healing at that time           Respectfully,  Jose Valadez MD  Spine Surgery  [facility]    Answers for HPI/ROS submitted by the patient on 1/25/2019   General Symptoms: Yes  Skin Symptoms: No  HENT Symptoms: Yes  EYE SYMPTOMS: Yes  HEART SYMPTOMS: No  LUNG SYMPTOMS: No  INTESTINAL SYMPTOMS: No  URINARY SYMPTOMS: No  GYNECOLOGIC SYMPTOMS: No  BREAST SYMPTOMS: No  SKELETAL SYMPTOMS: Yes  BLOOD SYMPTOMS: Yes  NERVOUS SYSTEM SYMPTOMS: Yes  MENTAL HEALTH SYMPTOMS: No  Fever: Yes  Loss of appetite: No  Weight loss: No  Weight gain: No  Fatigue: Yes  Night sweats: No  Chills: Yes  Increased stress: No  Excessive hunger: No  Excessive thirst: No  Feeling hot or cold when others believe the temperature is normal: Yes  Loss of height: No  Post-operative complications: No  Surgical site pain: Yes  Hallucinations: No  Change in or Loss of Energy: Yes  Hyperactivity: No  Confusion: No  Ear pain: No  Ear discharge: No  Hearing loss: No  Tinnitus: No  Nosebleeds: No  Congestion: Yes  Sinus pain: Yes  Trouble swallowing: Yes   Voice hoarseness: Yes  Mouth sores: No  Sore throat: Yes  Tooth pain: No  Gum tenderness: No  Bleeding gums: No  Change in taste: No  Change in sense of smell: No  Dry mouth: Yes  Hearing aid used: No  Neck lump: No  Eye pain: Yes  Vision loss: Yes  Dry eyes: Yes  Watery eyes: No  Eye bulging: No  Double vision: No  Flashing of lights:  No  Spots: No  Floaters: No  Redness: No  Crossed eyes: No  Tunnel Vision: No  Yellowing of eyes: No  Eye irritation: Yes  Back pain: Yes  Muscle aches: Yes  Neck pain: Yes  Swollen joints: Yes  Joint pain: Yes  Bone pain: Yes  Muscle cramps: No  Muscle weakness: Yes  Joint stiffness: No  Bone fracture: Yes  Anemia: Yes  Swollen glands: Yes  Easy bleeding or bruising: No  Edema or swelling: Yes  Trouble with coordination: Yes  Dizziness or trouble with balance: Yes  Fainting or black-out spells: No  Memory loss: No  Headache: Yes  Seizures: No  Speech problems: No  Tingling: Yes  Tremor: No  Weakness: Yes  Difficulty walking: Yes  Paralysis: No  Numbness: Yes

## 2019-01-25 NOTE — NURSING NOTE
"Reason For Visit:   Chief Complaint   Patient presents with     Surgical Followup     left femur fracture        Ht 1.746 m (5' 8.75\")   Wt 59 kg (130 lb)   LMP  (LMP Unknown)   BMI 19.34 kg/m      Pain Assessment  Patient Currently in Pain: Yes  0-10 Pain Scale: 5  Primary Pain Location: Leg    Basim Dixon ATC  "

## 2019-01-31 ENCOUNTER — TRANSFERRED RECORDS (OUTPATIENT)
Dept: HEALTH INFORMATION MANAGEMENT | Facility: CLINIC | Age: 64
End: 2019-01-31

## 2019-01-31 LAB — TSH SERPL-ACNC: 2.25 UIU/ML (ref 0.45–4.5)

## 2019-02-04 DIAGNOSIS — I10 ESSENTIAL HYPERTENSION, BENIGN: ICD-10-CM

## 2019-02-04 NOTE — TELEPHONE ENCOUNTER
"Requested Prescriptions   Pending Prescriptions Disp Refills     hydrochlorothiazide (HYDRODIURIL) 12.5 MG tablet [Pharmacy Med Name: HYDROCHLOROTHIAZIDE 12.5 MG TB]  Last Written Prescription Date:  7/28/16  Last Fill Quantity: 90,  # refills: 2   Last office visit: 11/27/2018 with prescribing provider:  ulisses   Future Office Visit:     270 tablet 0     Sig: TAKE 1 TABLET BY MOUTH EVERY DAY    Diuretics (Including Combos) Protocol Failed - 2/4/2019  1:40 AM       Failed - Blood pressure under 140/90 in past 12 months    BP Readings from Last 3 Encounters:   01/16/19 156/74   01/15/19 131/73   01/14/19 107/69                Failed - Medication is active on med list       Passed - Recent (12 mo) or future (30 days) visit within the authorizing provider's specialty    Patient had office visit in the last 12 months or has a visit in the next 30 days with authorizing provider or within the authorizing provider's specialty.  See \"Patient Info\" tab in inbasket, or \"Choose Columns\" in Meds & Orders section of the refill encounter.             Passed - Patient is age 18 or older       Passed - No active pregancy on record       Passed - Normal serum creatinine on file in past 12 months    Recent Labs   Lab Test 11/12/18  0622   CR 0.85             Passed - Normal serum potassium on file in past 12 months    Recent Labs   Lab Test 11/12/18  0622   POTASSIUM 4.4                   Passed - Normal serum sodium on file in past 12 months    Recent Labs   Lab Test 11/12/18  0622                Passed - No positive pregnancy test in past 12 months          "

## 2019-02-05 NOTE — TELEPHONE ENCOUNTER
Patient Contact    Attempt # 1    Was call answered?  No.  Left message on voicemail with information to call the clinic back and ask to talk with triage.     Triage please follow up with Dr. Pulliam's questions

## 2019-02-05 NOTE — TELEPHONE ENCOUNTER
Hmm, I think this was d/c'd at patient's last hospitalization as her blood pressure was low.  Can you call patient and see why request was made?  Are her BPs higher again?  Repeatedly?  Thanks,  Dr. Carolina Pulliam MD/[facility]

## 2019-02-08 DIAGNOSIS — R30.0 DYSURIA: ICD-10-CM

## 2019-02-08 LAB
ALBUMIN UR-MCNC: NEGATIVE MG/DL
APPEARANCE UR: CLEAR
BILIRUB UR QL STRIP: NEGATIVE
COLOR UR AUTO: YELLOW
GLUCOSE UR STRIP-MCNC: NEGATIVE MG/DL
HGB UR QL STRIP: NEGATIVE
KETONES UR STRIP-MCNC: NEGATIVE MG/DL
LEUKOCYTE ESTERASE UR QL STRIP: ABNORMAL
NITRATE UR QL: NEGATIVE
PH UR STRIP: 6 PH (ref 5–7)
RBC #/AREA URNS AUTO: NORMAL /HPF
SOURCE: ABNORMAL
SP GR UR STRIP: 1.01 (ref 1–1.03)
UROBILINOGEN UR STRIP-ACNC: 0.2 EU/DL (ref 0.2–1)
WBC #/AREA URNS AUTO: NORMAL /HPF

## 2019-02-08 PROCEDURE — 81001 URINALYSIS AUTO W/SCOPE: CPT | Performed by: FAMILY MEDICINE

## 2019-02-11 RX ORDER — HYDROCHLOROTHIAZIDE 12.5 MG/1
TABLET ORAL
Qty: 270 TABLET | Refills: 0 | OUTPATIENT
Start: 2019-02-11

## 2019-02-11 NOTE — TELEPHONE ENCOUNTER
Patient Contact    Attempt # 3    Was call answered?  No.  Left message on voicemail with information to call the clinic back because provider had follow up questions for patient.

## 2019-02-11 NOTE — TELEPHONE ENCOUNTER
Patient returned call regarding request for hydrochlorothiazide. She has continued to take it post-hospitalization. She read off the following BPs that she has recorded.     1/9 135/77  1/12 132/80  1/14 146/79  1/15 118/61  1/17 146/80  2/4 111/68   2/5 110/70   2/7  117/68   2/11 110/67    Pulse has been 80-85    Routing to provider for review.

## 2019-02-13 RX ORDER — HYDROCHLOROTHIAZIDE 12.5 MG/1
12.5 TABLET ORAL DAILY
Qty: 90 TABLET | Refills: 3 | Status: SHIPPED | OUTPATIENT
Start: 2019-02-13 | End: 2020-01-26

## 2019-02-25 ENCOUNTER — TELEPHONE (OUTPATIENT)
Dept: ORTHOPEDICS | Facility: CLINIC | Age: 64
End: 2019-02-25

## 2019-02-25 DIAGNOSIS — N39.0 FREQUENT UTI: ICD-10-CM

## 2019-02-25 DIAGNOSIS — R30.0 DYSURIA: ICD-10-CM

## 2019-02-25 LAB
ALBUMIN UR-MCNC: NEGATIVE MG/DL
APPEARANCE UR: CLEAR
BACTERIA #/AREA URNS HPF: ABNORMAL /HPF
BILIRUB UR QL STRIP: NEGATIVE
COLOR UR AUTO: YELLOW
GLUCOSE UR STRIP-MCNC: NEGATIVE MG/DL
HGB UR QL STRIP: NEGATIVE
KETONES UR STRIP-MCNC: NEGATIVE MG/DL
LEUKOCYTE ESTERASE UR QL STRIP: ABNORMAL
NITRATE UR QL: NEGATIVE
NON-SQ EPI CELLS #/AREA URNS LPF: ABNORMAL /LPF
PH UR STRIP: 5.5 PH (ref 5–7)
RBC #/AREA URNS AUTO: ABNORMAL /HPF
SOURCE: ABNORMAL
SP GR UR STRIP: 1.02 (ref 1–1.03)
UROBILINOGEN UR STRIP-ACNC: 0.2 EU/DL (ref 0.2–1)
WBC #/AREA URNS AUTO: ABNORMAL /HPF

## 2019-02-25 PROCEDURE — 81001 URINALYSIS AUTO W/SCOPE: CPT | Performed by: FAMILY MEDICINE

## 2019-02-25 PROCEDURE — 87086 URINE CULTURE/COLONY COUNT: CPT | Performed by: FAMILY MEDICINE

## 2019-02-25 NOTE — TELEPHONE ENCOUNTER
[facility] Call Center    Phone Message    May a detailed message be left on voicemail: yes    Reason for Call: Other: [facility] called stating they need referral faxed to them for ankle PT from Dr. Blackman. Their fax is [phone].     Action Taken: Message routed to:  [facility] ([facility] Ortho

## 2019-02-26 LAB
BACTERIA SPEC CULT: NORMAL
SPECIMEN SOURCE: NORMAL

## 2019-02-26 NOTE — RESULT ENCOUNTER NOTE
Await culture.  I called Marylee with results.  Dr. Carolina Pulliam MD/[facility]

## 2019-03-21 DIAGNOSIS — M89.8X5 PAIN OF LEFT FEMUR: Primary | ICD-10-CM

## 2019-03-22 ENCOUNTER — OFFICE VISIT (OUTPATIENT)
Dept: ORTHOPEDICS | Facility: CLINIC | Age: 64
End: 2019-03-22
Payer: COMMERCIAL

## 2019-03-22 ENCOUNTER — ANCILLARY PROCEDURE (OUTPATIENT)
Dept: GENERAL RADIOLOGY | Facility: CLINIC | Age: 64
End: 2019-03-22
Attending: ORTHOPAEDIC SURGERY
Payer: COMMERCIAL

## 2019-03-22 VITALS — HEIGHT: 69 IN | BODY MASS INDEX: 19.26 KG/M2 | WEIGHT: 130 LBS

## 2019-03-22 DIAGNOSIS — S82.892A CLOSED FRACTURE OF LEFT ANKLE, INITIAL ENCOUNTER: Primary | ICD-10-CM

## 2019-03-22 ASSESSMENT — MIFFLIN-ST. JEOR: SCORE: 1205.09

## 2019-03-22 NOTE — PROGRESS NOTES
Spine Surgery Return Clinic Visit      Chief Complaint:   RECHECK (left femur fracture )      Interval HPI:  Symptom Profile Including: location of symptoms, onset, severity, exacerbating/alleviating factors, previous treatments:        Marylee Woods is a 63 year old female who returns to clinic 5 months status post open reduction internal fixation of the left distal third femur fracture with a plate and screws.  She has been doing some physical therapy.  She has been partial weightbearing.  She mostly gets around in her walker and wheelchair as a result of her multiple sclerosis.  This was her baseline mobility before surgery.  Minimal leg pain.  Therapy has been working on knee range of motion.            Past Medical History:     Past Medical History:   Diagnosis Date     Gastro-oesophageal reflux disease      Multiple sclerosis (H)             Past Surgical History:     Past Surgical History:   Procedure Laterality Date     C/SECTION, LOW TRANSVERSE  1992     COLONOSCOPY  2007     COLONOSCOPY N/A 1/26/2017    Procedure: COMBINED COLONOSCOPY, SINGLE OR MULTIPLE BIOPSY/POLYPECTOMY BY BIOPSY;  Surgeon: Mayo Adkins MD, MD;  Location:  GI     ESOPHAGOSCOPY, GASTROSCOPY, DUODENOSCOPY (EGD), COMBINED  1/26/2017    Dr. Adkins [facility]     ESOPHAGOSCOPY, GASTROSCOPY, DUODENOSCOPY (EGD), COMBINED N/A 1/26/2017    Procedure: COMBINED ESOPHAGOSCOPY, GASTROSCOPY, DUODENOSCOPY (EGD), BIOPSY SINGLE OR MULTIPLE;  Surgeon: Mayo Adkins MD, MD;  Location:  GI     HIP SURGERY  2009    femur ortho surgery     LAPAROSCOPIC CHOLECYSTECTOMY  6/24/2014    Procedure: LAPAROSCOPIC CHOLECYSTECTOMY;  Surgeon: Randy Bailey MD;  Location: [facility]     OPEN REDUCTION INTERNAL FIXATION FEMUR DISTAL Left 11/1/2018    Procedure: OPEN REDUCTION INTERNAL FIXATION LEFT FEMUR DISTAL;  Surgeon: Jose Valadez MD;  Location:  OR     OPEN REDUCTION INTERNAL FIXATION RODDING INTRAMEDULLARY TIBIA  4/14/2013    Procedure: OPEN  REDUCTION INTERNAL FIXATION RODDING INTRAMEDULLARY TIBIA;;  Surgeon: Sajan Cast MD;  [facility]     ORTHOPEDIC SURGERY  2009    surgery right upper femur fx near hip            Social History:     Social History     Tobacco Use     Smoking status: Current Every Day Smoker     Packs/day: 0.25     Types: Cigarettes     Smokeless tobacco: Former User     Quit date: 12/16/2017   Substance Use Topics     Alcohol use: Yes     Alcohol/week: 0.0 oz     Comment: 1/wk            Family History:     Family History   Problem Relation Age of Onset     Heart Disease Maternal Grandmother      Cancer Maternal Grandfather      Heart Disease Paternal Grandfather      Heart Disease Mother      Heart Disease Father      Diabetes No family hx of      Coronary Artery Disease No family hx of      Hypertension No family hx of      Hyperlipidemia No family hx of      Cerebrovascular Disease No family hx of      Breast Cancer No family hx of      Colon Cancer No family hx of      Prostate Cancer No family hx of      Other Cancer No family hx of      Depression No family hx of      Anxiety Disorder No family hx of      Mental Illness No family hx of      Substance Abuse No family hx of      Anesthesia Reaction No family hx of      Asthma No family hx of      Osteoporosis No family hx of      Genetic Disorder No family hx of      Thyroid Disease No family hx of      Obesity No family hx of      Unknown/Adopted No family hx of             Allergies:     Allergies   Allergen Reactions     Amantadine      hallucinations     Budesonide      Symbicort Rash            Medications:     Current Outpatient Medications   Medication     acetaminophen (TYLENOL) 325 MG tablet     baclofen 5 MG TABS     baclofen 5 MG TABS     baclofen 5 MG TABS     buPROPion (WELLBUTRIN XL) 150 MG 24 hr tablet     cholecalciferol (VITAMIN D3) 5000 units CAPS capsule     ferrous sulfate (IRON) 325 (65 Fe) MG tablet     gabapentin (NEURONTIN) 300 MG capsule      "gabapentin (NEURONTIN) 300 MG capsule     hydrochlorothiazide (HYDRODIURIL) 12.5 MG tablet     hydrOXYzine (VISTARIL) 50 MG capsule     multivitamin, therapeutic with minerals (THERA-VIT-M) TABS     NICOTROL 10 MG inhaler     nitroFURantoin, macrocrystal-monohydrate, (MACROBID) 100 MG capsule     omeprazole (PRILOSEC) 40 MG capsule     oxybutynin (DITROPAN-XL) 10 MG 24 hr tablet     polyethylene glycol (MIRALAX/GLYCOLAX) Packet     ranitidine (ZANTAC) 150 MG capsule     sertraline (ZOLOFT) 100 MG tablet     valACYclovir (VALTREX) 500 MG tablet     No current facility-administered medications for this visit.              Review of Systems:   A focused musculoskeletal and neurologic ROS was performed with pertinent positives and negatives noted in the HPI.  Additional systems were also reviewed and are documented at the bottom of the note.         Physical Exam:   Vitals: Ht 1.746 m (5' 8.75\")   Wt 59 kg (130 lb)   LMP  (LMP Unknown)   BMI 19.34 kg/m    Musculoskeletal, Neurologic, and Spine:   I examined the left leg.  Incisions are well-healed.  Knee range of motion is from .  Some stiffness and swelling about the knee.  Mild pain with range of motion.  The function in the foot is very limited in the setting of her multiple sclerosis and she wears her AFO at all times.  Sensation intact superficial peroneal deep peroneal tibial nerves and a warm well-perfused foot         Imaging:   We ordered and independently reviewed new radiographs at this clinic visit. The results were discussed with the patient. Findings include:    AP and lateral left field femur films show unchanged position of the implants with no evidence of interval complication       Assessment and Plan:     63 year old female with ongoing healing status post left open reduction internal fixation of femur fracture.  I think she can advance to full weightbearing at this time.  I would like to advance her activities.  Continue work with therapy " and work on knee range of motion and leg strengthening.  Follow-up in 6 months with repeat x-rays or questions if she has any concerns sooner than that           Respectfully,  Jose Valadez MD  Spine Surgery  [facility]

## 2019-03-22 NOTE — NURSING NOTE
"Reason For Visit:   Chief Complaint   Patient presents with     RECHECK     left femur fracture        Ht 1.746 m (5' 8.75\")   Wt 59 kg (130 lb)   LMP  (LMP Unknown)   BMI 19.34 kg/m      Pain Assessment  Patient Currently in Pain: Yes  0-10 Pain Scale: 4  Primary Pain Location: Leg(getting in and out of the car )    Basim Dixon ATC  "

## 2019-03-22 NOTE — LETTER
3/22/2019       RE: Marylee Woods  [address]  [place] [address]     Dear Colleague,    Thank you for referring your patient, Marylee Woods, to [facility]. Please see a copy of my visit note below.    Spine Surgery Return Clinic Visit      Chief Complaint:   RECHECK (left femur fracture )      Interval HPI:  Symptom Profile Including: location of symptoms, onset, severity, exacerbating/alleviating factors, previous treatments:        Marylee Woods is a 63 year old female who returns to clinic 5 months status post open reduction internal fixation of the left distal third femur fracture with a plate and screws.  She has been doing some physical therapy.  She has been partial weightbearing.  She mostly gets around in her walker and wheelchair as a result of her multiple sclerosis.  This was her baseline mobility before surgery.  Minimal leg pain.  Therapy has been working on knee range of motion.         Past Medical History:     Past Medical History:   Diagnosis Date     Gastro-oesophageal reflux disease      Multiple sclerosis (H)             Past Surgical History:     Past Surgical History:   Procedure Laterality Date     C/SECTION, LOW TRANSVERSE  1992     COLONOSCOPY  2007     COLONOSCOPY N/A 1/26/2017    Procedure: COMBINED COLONOSCOPY, SINGLE OR MULTIPLE BIOPSY/POLYPECTOMY BY BIOPSY;  Surgeon: Mayo Adkins MD, MD;  Location:  GI     ESOPHAGOSCOPY, GASTROSCOPY, DUODENOSCOPY (EGD), COMBINED  1/26/2017    Dr. Adkins [facility]     ESOPHAGOSCOPY, GASTROSCOPY, DUODENOSCOPY (EGD), COMBINED N/A 1/26/2017    Procedure: COMBINED ESOPHAGOSCOPY, GASTROSCOPY, DUODENOSCOPY (EGD), BIOPSY SINGLE OR MULTIPLE;  Surgeon: Mayo Adkins MD, MD;  Location:  GI     HIP SURGERY  2009    femur ortho surgery     LAPAROSCOPIC CHOLECYSTECTOMY  6/24/2014    Procedure: LAPAROSCOPIC CHOLECYSTECTOMY;  Surgeon: Randy Bailey MD;  Location: [facility]     OPEN  REDUCTION INTERNAL FIXATION FEMUR DISTAL Left 11/1/2018    Procedure: OPEN REDUCTION INTERNAL FIXATION LEFT FEMUR DISTAL;  Surgeon: Jose Valadez MD;  [facility]     OPEN REDUCTION INTERNAL FIXATION RODDING INTRAMEDULLARY TIBIA  4/14/2013    Procedure: OPEN REDUCTION INTERNAL FIXATION RODDING INTRAMEDULLARY TIBIA;;  Surgeon: Sajan Cast MD;  [facility]     ORTHOPEDIC SURGERY  2009    surgery right upper femur fx near hip            Social History:     Social History     Tobacco Use     Smoking status: Current Every Day Smoker     Packs/day: 0.25     Types: Cigarettes     Smokeless tobacco: Former User     Quit date: 12/16/2017   Substance Use Topics     Alcohol use: Yes     Alcohol/week: 0.0 oz     Comment: 1/wk            Family History:     Family History   Problem Relation Age of Onset     Heart Disease Maternal Grandmother      Cancer Maternal Grandfather      Heart Disease Paternal Grandfather      Heart Disease Mother      Heart Disease Father      Diabetes No family hx of      Coronary Artery Disease No family hx of      Hypertension No family hx of      Hyperlipidemia No family hx of      Cerebrovascular Disease No family hx of      Breast Cancer No family hx of      Colon Cancer No family hx of      Prostate Cancer No family hx of      Other Cancer No family hx of      Depression No family hx of      Anxiety Disorder No family hx of      Mental Illness No family hx of      Substance Abuse No family hx of      Anesthesia Reaction No family hx of      Asthma No family hx of      Osteoporosis No family hx of      Genetic Disorder No family hx of      Thyroid Disease No family hx of      Obesity No family hx of      Unknown/Adopted No family hx of             Allergies:     Allergies   Allergen Reactions     Amantadine      hallucinations     Budesonide      Symbicort Rash            Medications:     Current Outpatient Medications   Medication     acetaminophen (TYLENOL) 325 MG  "tablet     baclofen 5 MG TABS     baclofen 5 MG TABS     baclofen 5 MG TABS     buPROPion (WELLBUTRIN XL) 150 MG 24 hr tablet     cholecalciferol (VITAMIN D3) 5000 units CAPS capsule     ferrous sulfate (IRON) 325 (65 Fe) MG tablet     gabapentin (NEURONTIN) 300 MG capsule     gabapentin (NEURONTIN) 300 MG capsule     hydrochlorothiazide (HYDRODIURIL) 12.5 MG tablet     hydrOXYzine (VISTARIL) 50 MG capsule     multivitamin, therapeutic with minerals (THERA-VIT-M) TABS     NICOTROL 10 MG inhaler     nitroFURantoin, macrocrystal-monohydrate, (MACROBID) 100 MG capsule     omeprazole (PRILOSEC) 40 MG capsule     oxybutynin (DITROPAN-XL) 10 MG 24 hr tablet     polyethylene glycol (MIRALAX/GLYCOLAX) Packet     ranitidine (ZANTAC) 150 MG capsule     sertraline (ZOLOFT) 100 MG tablet     valACYclovir (VALTREX) 500 MG tablet     No current facility-administered medications for this visit.              Review of Systems:   A focused musculoskeletal and neurologic ROS was performed with pertinent positives and negatives noted in the HPI.  Additional systems were also reviewed and are documented at the bottom of the note.         Physical Exam:   Vitals: Ht 1.746 m (5' 8.75\")   Wt 59 kg (130 lb)   LMP  (LMP Unknown)   BMI 19.34 kg/m     Musculoskeletal, Neurologic, and Spine:   I examined the left leg.  Incisions are well-healed.  Knee range of motion is from .  Some stiffness and swelling about the knee.  Mild pain with range of motion.  The function in the foot is very limited in the setting of her multiple sclerosis and she wears her AFO at all times.  Sensation intact superficial peroneal deep peroneal tibial nerves and a warm well-perfused foot         Imaging:   We ordered and independently reviewed new radiographs at this clinic visit. The results were discussed with the patient. Findings include:    AP and lateral left field femur films show unchanged position of the implants with no evidence of interval " complication     Assessment and Plan:     63 year old female with ongoing healing status post left open reduction internal fixation of femur fracture.  I think she can advance to full weightbearing at this time.  I would like to advance her activities.  Continue work with therapy and work on knee range of motion and leg strengthening.  Follow-up in 6 months with repeat x-rays or questions if she has any concerns sooner than that     Respectfully,  Jose Valadez MD  Spine Surgery  [facility]

## 2019-04-22 ENCOUNTER — TRANSFERRED RECORDS (OUTPATIENT)
Dept: HEALTH INFORMATION MANAGEMENT | Facility: CLINIC | Age: 64
End: 2019-04-22

## 2019-04-24 DIAGNOSIS — R82.90 ABNORMAL URINE FINDINGS: Primary | ICD-10-CM

## 2019-04-24 LAB
ALBUMIN UR-MCNC: 30 MG/DL
APPEARANCE UR: CLEAR
BILIRUB UR QL STRIP: NEGATIVE
COLOR UR AUTO: YELLOW
GLUCOSE UR STRIP-MCNC: NEGATIVE MG/DL
HGB UR QL STRIP: ABNORMAL
KETONES UR STRIP-MCNC: NEGATIVE MG/DL
LEUKOCYTE ESTERASE UR QL STRIP: ABNORMAL
NITRATE UR QL: NEGATIVE
PH UR STRIP: 6 PH (ref 5–7)
RBC #/AREA URNS AUTO: ABNORMAL /HPF
SOURCE: ABNORMAL
SP GR UR STRIP: 1.01 (ref 1–1.03)
UROBILINOGEN UR STRIP-ACNC: 0.2 EU/DL (ref 0.2–1)
WBC #/AREA URNS AUTO: ABNORMAL /HPF

## 2019-04-24 PROCEDURE — 81001 URINALYSIS AUTO W/SCOPE: CPT | Performed by: FAMILY MEDICINE

## 2019-04-24 PROCEDURE — 87086 URINE CULTURE/COLONY COUNT: CPT | Performed by: FAMILY MEDICINE

## 2019-04-24 NOTE — RESULT ENCOUNTER NOTE
Hi Marylee:  Your tests today are equivocal - we're going to send the result in for a culture.  If it's positive I'll send in antibiotics.  Best,  Dr. Carolina Pulliam MD/[facility]

## 2019-04-25 LAB
BACTERIA SPEC CULT: NORMAL
SPECIMEN SOURCE: NORMAL

## 2019-04-26 NOTE — RESULT ENCOUNTER NOTE
Hi Marylee:  No infection this week.  Good news!  Let me know if you have any questions about this.  Dr. Carolina Pulliam MD  [facility]  [phone]

## 2019-04-27 ENCOUNTER — OFFICE VISIT (OUTPATIENT)
Dept: FAMILY MEDICINE | Facility: CLINIC | Age: 64
End: 2019-04-27
Payer: MEDICARE

## 2019-04-27 ENCOUNTER — ANCILLARY PROCEDURE (OUTPATIENT)
Dept: GENERAL RADIOLOGY | Facility: CLINIC | Age: 64
End: 2019-04-27
Attending: PHYSICIAN ASSISTANT
Payer: MEDICARE

## 2019-04-27 VITALS
DIASTOLIC BLOOD PRESSURE: 66 MMHG | OXYGEN SATURATION: 92 % | HEART RATE: 96 BPM | SYSTOLIC BLOOD PRESSURE: 102 MMHG | TEMPERATURE: 99.7 F | RESPIRATION RATE: 16 BRPM

## 2019-04-27 DIAGNOSIS — R05.9 COUGH: Primary | ICD-10-CM

## 2019-04-27 DIAGNOSIS — J18.9 COMMUNITY ACQUIRED PNEUMONIA, UNSPECIFIED LATERALITY: ICD-10-CM

## 2019-04-27 DIAGNOSIS — B37.0 THRUSH: ICD-10-CM

## 2019-04-27 DIAGNOSIS — G35 MS (MULTIPLE SCLEROSIS) (H): ICD-10-CM

## 2019-04-27 DIAGNOSIS — T30.0 THERMAL BURN: ICD-10-CM

## 2019-04-27 DIAGNOSIS — R05.9 COUGH: ICD-10-CM

## 2019-04-27 PROCEDURE — 71046 X-RAY EXAM CHEST 2 VIEWS: CPT | Mod: FY

## 2019-04-27 PROCEDURE — 99215 OFFICE O/P EST HI 40 MIN: CPT | Mod: 25 | Performed by: PHYSICIAN ASSISTANT

## 2019-04-27 PROCEDURE — 16020 DRESS/DEBRID P-THICK BURN S: CPT | Performed by: PHYSICIAN ASSISTANT

## 2019-04-27 RX ORDER — ALBUTEROL SULFATE 90 UG/1
1-2 AEROSOL, METERED RESPIRATORY (INHALATION) EVERY 4 HOURS PRN
Qty: 8.5 G | Refills: 0 | Status: SHIPPED | OUTPATIENT
Start: 2019-04-27 | End: 2020-06-30

## 2019-04-27 RX ORDER — DOXYCYCLINE HYCLATE 100 MG
100 TABLET ORAL 2 TIMES DAILY
Qty: 14 TABLET | Refills: 0 | Status: SHIPPED | OUTPATIENT
Start: 2019-04-27 | End: 2019-05-04

## 2019-04-27 RX ORDER — NYSTATIN 100000/ML
SUSPENSION, ORAL (FINAL DOSE FORM) ORAL
Qty: 200 ML | Refills: 0 | Status: SHIPPED | OUTPATIENT
Start: 2019-04-27 | End: 2019-05-04

## 2019-04-27 RX ORDER — BACLOFEN 10 MG/1
TABLET ORAL
Refills: 11 | COMMUNITY
Start: 2019-04-01 | End: 2021-07-01

## 2019-04-27 ASSESSMENT — ANXIETY QUESTIONNAIRES
2. NOT BEING ABLE TO STOP OR CONTROL WORRYING: NOT AT ALL
3. WORRYING TOO MUCH ABOUT DIFFERENT THINGS: NOT AT ALL
7. FEELING AFRAID AS IF SOMETHING AWFUL MIGHT HAPPEN: NOT AT ALL
GAD7 TOTAL SCORE: 2
GAD7 TOTAL SCORE: 2
7. FEELING AFRAID AS IF SOMETHING AWFUL MIGHT HAPPEN: NOT AT ALL
1. FEELING NERVOUS, ANXIOUS, OR ON EDGE: SEVERAL DAYS
4. TROUBLE RELAXING: NOT AT ALL
GAD7 TOTAL SCORE: 2
6. BECOMING EASILY ANNOYED OR IRRITABLE: SEVERAL DAYS
5. BEING SO RESTLESS THAT IT IS HARD TO SIT STILL: NOT AT ALL

## 2019-04-27 ASSESSMENT — PATIENT HEALTH QUESTIONNAIRE - PHQ9
10. IF YOU CHECKED OFF ANY PROBLEMS, HOW DIFFICULT HAVE THESE PROBLEMS MADE IT FOR YOU TO DO YOUR WORK, TAKE CARE OF THINGS AT HOME, OR GET ALONG WITH OTHER PEOPLE: SOMEWHAT DIFFICULT
SUM OF ALL RESPONSES TO PHQ QUESTIONS 1-9: 10
SUM OF ALL RESPONSES TO PHQ QUESTIONS 1-9: 10

## 2019-04-27 NOTE — PATIENT INSTRUCTIONS
Patient Education     Hot Water Burn  Hot water on the skin can cause a first- or second-degree burn. A first-degree burn causes only redness and heals in a few days. A second-degree burn is deeper. It causes a blister to form. The blister may break and leak clear fluid. It may become infected. Second-degree burns take 1 to 2 weeks to heal.  Home care  The following guidelines will help you care for your burn at home:    On the first day, put a small towel soaked in cool water on the area to ease severe pain.    If no blister formed, you may use creams with benzocaine if the burn is painful. You may also use moisturizers with aloe vera.    If a blister formed and broke and a bandage was applied, change it once a day, or as directed. If the bandage sticks, remove it by soaking it in warm water. Wash the burned area daily with soap and water. Pat dry with a clean towel. For the next 3 to 5 days, put an antibiotic cream or ointment on the area after washing. This will help to prevent an infection and to keep the bandage from sticking.    If a blister formed, it will go down by itself. Or it will break on its own in the next few days. If the blister breaks, a clear fluid will leak from it for a day or two. The loose skin from the broken blister has no feeling.  You can carefully trim away this skin with clean, small, sharp scissors. Sterilize by soaking in alcohol first. Or wash with soap and water. Wash the raw surface under the blister daily with soap and water. For the next 3 to 5 days, put an antibiotic cream or ointment on the area after washing. This will help prevent an infection and keep the bandage from sticking.    You may use over-the-counter medicine to control pain, unless another pain medicine was prescribed. If you have chronic liver or kidney disease, talk with your healthcare provider before using acetaminophen or ibuprofen. Also talk with your provider if you've had a stomach ulcer or GI bleeding.  Don't give ibuprofen to children younger than 6 months of age.    Don't pick or scratch at the affected areas. Use over-the-counter medicine for itching.    Wear a hat, sunscreen, and long sleeves while in the sun.    Don't wear tight-fitting clothes.    Add more calories and protein to your diet until the wound has healed. Drink plenty of water.  Follow-up care  Follow up with your healthcare provider, or as advised. Most hot water burns heal without becoming infected. Sometimes an infection happens even with proper treatment. You should watch for the signs of infection listed below.  When to seek medical advice  Call your healthcare provider right away if any of these occur:    Pain that gets worse    Redness or swelling that gets worse    Pus coming from the wound    Fever of 100.4  F (38  C) or higher, or as directed by your healthcare provider    The wound doesn't seem to be healing    Nausea or vomiting   Date Last Reviewed: 12/1/2016 2000-2018 [employer]. [address] [place] [address], [place] [address]. All rights reserved. This information is not intended as a substitute for professional medical care. Always follow your healthcare professional's instructions.
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
## 2019-04-28 ASSESSMENT — ANXIETY QUESTIONNAIRES: GAD7 TOTAL SCORE: 2
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
## 2019-05-04 ENCOUNTER — ANCILLARY PROCEDURE (OUTPATIENT)
Dept: GENERAL RADIOLOGY | Facility: CLINIC | Age: 64
End: 2019-05-04
Attending: FAMILY MEDICINE
Payer: MEDICARE

## 2019-05-04 ENCOUNTER — OFFICE VISIT (OUTPATIENT)
Dept: FAMILY MEDICINE | Facility: CLINIC | Age: 64
End: 2019-05-04
Payer: MEDICARE

## 2019-05-04 VITALS
RESPIRATION RATE: 16 BRPM | WEIGHT: 130 LBS | HEART RATE: 77 BPM | TEMPERATURE: 98.1 F | SYSTOLIC BLOOD PRESSURE: 108 MMHG | OXYGEN SATURATION: 97 % | BODY MASS INDEX: 19.34 KG/M2 | DIASTOLIC BLOOD PRESSURE: 64 MMHG

## 2019-05-04 DIAGNOSIS — G35 MS (MULTIPLE SCLEROSIS) (H): ICD-10-CM

## 2019-05-04 DIAGNOSIS — J18.9 COMMUNITY ACQUIRED PNEUMONIA, UNSPECIFIED LATERALITY: ICD-10-CM

## 2019-05-04 DIAGNOSIS — T30.0 THERMAL BURN: ICD-10-CM

## 2019-05-04 DIAGNOSIS — B37.81 CANDIDA ESOPHAGITIS (H): ICD-10-CM

## 2019-05-04 DIAGNOSIS — J18.9 COMMUNITY ACQUIRED PNEUMONIA, UNSPECIFIED LATERALITY: Primary | ICD-10-CM

## 2019-05-04 DIAGNOSIS — R91.8 LUNG NODULES: ICD-10-CM

## 2019-05-04 DIAGNOSIS — Z87.891 FORMER TOBACCO USE: ICD-10-CM

## 2019-05-04 PROCEDURE — 71046 X-RAY EXAM CHEST 2 VIEWS: CPT | Mod: FY

## 2019-05-04 PROCEDURE — 99215 OFFICE O/P EST HI 40 MIN: CPT | Performed by: FAMILY MEDICINE

## 2019-05-04 RX ORDER — ACETAMINOPHEN 500 MG
500-1000 TABLET ORAL EVERY 8 HOURS PRN
Start: 2019-05-04

## 2019-05-04 RX ORDER — GABAPENTIN 300 MG/1
600 CAPSULE ORAL AT BEDTIME
Start: 2019-05-04 | End: 2020-07-03

## 2019-05-04 RX ORDER — SILVER SULFADIAZINE 10 MG/G
CREAM TOPICAL 2 TIMES DAILY
Qty: 50 G | Refills: 1 | Status: SHIPPED | OUTPATIENT
Start: 2019-05-04 | End: 2020-07-03

## 2019-05-04 RX ORDER — FLUCONAZOLE 100 MG/1
100 TABLET ORAL DAILY
Qty: 7 TABLET | Refills: 0 | Status: SHIPPED | OUTPATIENT
Start: 2019-05-04 | End: 2019-10-23

## 2019-05-04 NOTE — RESULT ENCOUNTER NOTE
Here are the results we discussed in clinic today.  Let me know if you have any questions or concerns.  Dr. Carolina Pulliam MD/[facility]

## 2019-05-04 NOTE — PATIENT INSTRUCTIONS
1. For your yeast infection:  1. Fluconazole 100 mg daily x 7 days.  2. If no improvement in pain - see GI again.    2. For your pneumonia:  1. Your lungs still sound congested today.  2. I'd like you to use your ALBUTEROL inhaler twice a day, with the technique we discussed.  3. I'd like you to have the follow up chest CT scan we ordered in the past - your lung still sound congested today and I'd like to be sure we're not dealing with emphysema or other lung damage that we should be managing differently.  Call to schedule For [facility] Radiology call [phone].  4. Follow up 1 week if not continuing to improve.    3. For your burn:  1. Topical silver sulfadiazene ointment - thick layer - twice a day.  2. NONSTICK dressing.  Change twice a day.

## 2019-05-13 DIAGNOSIS — F32.5 MAJOR DEPRESSION IN COMPLETE REMISSION (H): ICD-10-CM

## 2019-05-13 NOTE — TELEPHONE ENCOUNTER
"Requested Prescriptions   Pending Prescriptions Disp Refills     buPROPion (WELLBUTRIN XL) 150 MG 24 hr tablet [Pharmacy Med Name:  Last Written Prescription Date:  11/27/2018  Last Fill Quantity: 90,  # refills: 1   Last office visit: 5/4/2019 with prescribing provider:  Dr Pulliam  PHQ-9 SCORE 6/13/2018 11/27/2018 4/27/2019   PHQ-9 Total Score - - -   PHQ-9 Total Score MyChart - 5 (Mild depression) 10 (Moderate depression)   PHQ-9 Total Score 5 5 10     MARGARITO-7 SCORE 4/27/2019   Total Score 2 (minimal anxiety)   Total Score 2          Future Office Visit:     BUPROPION HCL  MG TABLET] 90 tablet 1     Sig: TAKE 1 TABLET (150 MG) BY MOUTH EVERY MORNING       SSRIs Protocol Failed - 5/13/2019  1:35 AM        Failed - PHQ-9 score less than 5 in past 6 months     Please review last PHQ-9 score.           Passed - Medication is Bupropion     If the medication is Bupropion (Wellbutrin), and the patient is taking for smoking cessation; OK to refill.          Passed - Medication is active on med list        Passed - Patient is age 18 or older        Passed - No active pregnancy on record        Passed - No positive pregnancy test in last 12 months        Passed - Recent (6 mo) or future (30 days) visit within the authorizing provider's specialty     Patient had office visit in the last 6 months or has a visit in the next 30 days with authorizing provider or within the authorizing provider's specialty.  See \"Patient Info\" tab in inbasket, or \"Choose Columns\" in Meds & Orders section of the refill encounter.              "

## 2019-05-14 RX ORDER — BUPROPION HYDROCHLORIDE 150 MG/1
150 TABLET ORAL EVERY MORNING
Qty: 90 TABLET | Refills: 1 | Status: SHIPPED | OUTPATIENT
Start: 2019-05-14 | End: 2019-11-01

## 2019-05-14 NOTE — TELEPHONE ENCOUNTER
Can you repeat PHQ-9?  I'll fill in meantime.  Dr. Carolina Pulliam MD/[facility]

## 2019-05-14 NOTE — TELEPHONE ENCOUNTER
PHQ-9 score:    PHQ-9 SCORE 4/27/2019   PHQ-9 Total Score -   PHQ-9 Total Score MyChart 10 (Moderate depression)   PHQ-9 Total Score 10           Routing refill request to provider for review/approval because:  PHQ-9 > 4

## 2019-06-06 ENCOUNTER — HOSPITAL ENCOUNTER (OUTPATIENT)
Dept: CT IMAGING | Facility: CLINIC | Age: 64
Discharge: HOME | End: 2019-06-06
Attending: FAMILY MEDICINE | Admitting: FAMILY MEDICINE
Payer: MEDICARE

## 2019-06-06 DIAGNOSIS — R91.8 LUNG NODULES: ICD-10-CM

## 2019-06-06 PROCEDURE — 71250 CT THORAX DX C-: CPT

## 2019-06-07 NOTE — RESULT ENCOUNTER NOTE
Good news Marylee - your lung cancer screening CT looks normal.  We recommend you continue to check this yearly.  Let me know if you have any questions about this.  Dr. Carolina Pulliam MD  [facility]  [phone]

## 2019-06-17 ENCOUNTER — TRANSFERRED RECORDS (OUTPATIENT)
Dept: HEALTH INFORMATION MANAGEMENT | Facility: CLINIC | Age: 64
End: 2019-06-17

## 2019-07-09 ENCOUNTER — MEDICAL CORRESPONDENCE (OUTPATIENT)
Dept: HEALTH INFORMATION MANAGEMENT | Facility: CLINIC | Age: 64
End: 2019-07-09

## 2019-07-09 ENCOUNTER — TRANSFERRED RECORDS (OUTPATIENT)
Dept: HEALTH INFORMATION MANAGEMENT | Facility: CLINIC | Age: 64
End: 2019-07-09

## 2019-08-06 DIAGNOSIS — K21.9 GASTROESOPHAGEAL REFLUX DISEASE, ESOPHAGITIS PRESENCE NOT SPECIFIED: ICD-10-CM

## 2019-08-06 RX ORDER — OMEPRAZOLE 40 MG/1
CAPSULE, DELAYED RELEASE ORAL
Qty: 90 CAPSULE | Refills: 2 | Status: SHIPPED | OUTPATIENT
Start: 2019-08-06 | End: 2020-05-01

## 2019-08-06 NOTE — TELEPHONE ENCOUNTER
"Requested Prescriptions   Pending Prescriptions Disp Refills     omeprazole (PRILOSEC) 40 MG DR capsule [Pharmacy Med Name: OMEPRAZOLE DR 40 MG CAPSULE] 90 capsule 2     Sig: TAKE 1 CAPSULE BY MOUTH EVERY DAY   Last Written Prescription Date:  10/22/18  Last Fill Quantity: 90,  # refills: 2   Last office visit: 5/4/2019 with prescribing provider:     Future Office Visit:        PPI Protocol Passed - 8/6/2019  1:40 AM        Passed - Not on Clopidogrel (unless Pantoprazole ordered)        Passed - No diagnosis of osteoporosis on record        Passed - Recent (12 mo) or future (30 days) visit within the authorizing provider's specialty     Patient had office visit in the last 12 months or has a visit in the next 30 days with authorizing provider or within the authorizing provider's specialty.  See \"Patient Info\" tab in inbasket, or \"Choose Columns\" in Meds & Orders section of the refill encounter.              Passed - Medication is active on med list        Passed - Patient is age 18 or older        Passed - No active pregnacy on record        Passed - No positive pregnancy test in past 12 months          "

## 2019-09-19 ENCOUNTER — MYC MEDICAL ADVICE (OUTPATIENT)
Dept: FAMILY MEDICINE | Facility: CLINIC | Age: 64
End: 2019-09-19

## 2019-09-19 ENCOUNTER — TELEPHONE (OUTPATIENT)
Dept: FAMILY MEDICINE | Facility: CLINIC | Age: 64
End: 2019-09-19

## 2019-09-19 DIAGNOSIS — N39.0 RECURRENT UTI: Primary | ICD-10-CM

## 2019-09-19 LAB
ALBUMIN UR-MCNC: 30 MG/DL
APPEARANCE UR: ABNORMAL
BACTERIA #/AREA URNS HPF: ABNORMAL /HPF
BILIRUB UR QL STRIP: NEGATIVE
COLOR UR AUTO: YELLOW
GLUCOSE UR STRIP-MCNC: NEGATIVE MG/DL
HGB UR QL STRIP: NEGATIVE
KETONES UR STRIP-MCNC: NEGATIVE MG/DL
LEUKOCYTE ESTERASE UR QL STRIP: ABNORMAL
NITRATE UR QL: POSITIVE
PH UR STRIP: 8.5 PH (ref 5–7)
RBC #/AREA URNS AUTO: ABNORMAL /HPF
SOURCE: ABNORMAL
SP GR UR STRIP: 1.01 (ref 1–1.03)
UROBILINOGEN UR STRIP-ACNC: 0.2 EU/DL (ref 0.2–1)
WBC #/AREA URNS AUTO: ABNORMAL /HPF

## 2019-09-19 PROCEDURE — 81001 URINALYSIS AUTO W/SCOPE: CPT | Performed by: FAMILY MEDICINE

## 2019-09-19 PROCEDURE — 87088 URINE BACTERIA CULTURE: CPT | Performed by: FAMILY MEDICINE

## 2019-09-19 PROCEDURE — 87186 SC STD MICRODIL/AGAR DIL: CPT | Performed by: FAMILY MEDICINE

## 2019-09-19 PROCEDURE — 87086 URINE CULTURE/COLONY COUNT: CPT | Performed by: FAMILY MEDICINE

## 2019-09-19 NOTE — TELEPHONE ENCOUNTER
Message left on patients VM. She was told to check her mychart for a message from her PCP.      Mychart message sent to patient with provider note.

## 2019-09-19 NOTE — RESULT ENCOUNTER NOTE
This looks like a Urinary Tract Infection.  Can you ask Marylee to send in E-visit so we can prescribe antibiotics?  Thanks,  Dr. Carolina Pulliam MD/[facility]

## 2019-09-20 ENCOUNTER — E-VISIT (OUTPATIENT)
Dept: FAMILY MEDICINE | Facility: CLINIC | Age: 64
End: 2019-09-20
Payer: MEDICARE

## 2019-09-20 DIAGNOSIS — N39.0 COMPLICATED URINARY TRACT INFECTION: Primary | ICD-10-CM

## 2019-09-20 PROCEDURE — 99444 ZZC PHYSICIAN ONLINE EVALUATION & MANAGEMENT SERVICE: CPT | Performed by: FAMILY MEDICINE

## 2019-09-20 RX ORDER — CIPROFLOXACIN 250 MG/1
500 TABLET, FILM COATED ORAL 2 TIMES DAILY
Qty: 14 TABLET | Refills: 0 | Status: SHIPPED | OUTPATIENT
Start: 2019-09-20 | End: 2019-10-23

## 2019-09-20 NOTE — TELEPHONE ENCOUNTER
Patient called clinic and said she was unable to get to a computer at the moment but agreed to do an e-Visit. She will call the clinic back if she needs any assistance with this.

## 2019-09-22 LAB
BACTERIA SPEC CULT: ABNORMAL
SPECIMEN SOURCE: ABNORMAL

## 2019-09-23 NOTE — RESULT ENCOUNTER NOTE
Hi Marylee:  It looks like the antibiotic I sent for you should have worked well.  Let me know if you have any questions about this or if you are not feeling better.  Best,  Dr. Carolina Pulliam MD  [facility]  [phone]

## 2019-09-28 ENCOUNTER — HEALTH MAINTENANCE LETTER (OUTPATIENT)
Age: 64
End: 2019-09-28

## 2019-10-11 DIAGNOSIS — N39.0 COMPLICATED URINARY TRACT INFECTION: ICD-10-CM

## 2019-10-11 DIAGNOSIS — Z29.89 NEED FOR PROPHYLAXIS AGAINST URINARY TRACT INFECTION: ICD-10-CM

## 2019-10-11 NOTE — TELEPHONE ENCOUNTER
nitroFURantoin macrocrystal (MACRODANTIN) 100 MG capsule     Last Written Prescription Date:  na  Last Fill Quantity: na,  # refills: na   Last office visit: 5/4/2019 with prescribing provider:  Heraclio   Future Office Visit:           Routing refill request to provider for review/approval because:  Drug not on the [facility], [facility] or [facility] refill protocol or controlled substance

## 2019-10-15 NOTE — TELEPHONE ENCOUNTER
Patient Contact    Attempt # 1    Was call answered?  No.  Left message on voicemail with information to call triage back.    Upon callback:  clarify if she is requesting this medication. If yes, triage symptoms.

## 2019-10-18 NOTE — TELEPHONE ENCOUNTER
Patient Contact    Attempt # 1    Was call answered?  No.  Left message on voicemail with information to call the clinic back to talk with triage.     On call back: Dose she need this? Who prescribed it? How often is she taking? Does she need [facility] provider to fill? Send to PCP if needed as historical med for provider review.

## 2019-10-21 RX ORDER — NITROFURANTOIN MACROCRYSTAL 100 MG
100 CAPSULE ORAL DAILY
Qty: 90 CAPSULE | Refills: 3 | OUTPATIENT
Start: 2019-10-21

## 2019-10-23 ENCOUNTER — OFFICE VISIT (OUTPATIENT)
Dept: FAMILY MEDICINE | Facility: CLINIC | Age: 64
End: 2019-10-23
Payer: MEDICARE

## 2019-10-23 VITALS
TEMPERATURE: 99.5 F | OXYGEN SATURATION: 99 % | WEIGHT: 130 LBS | HEIGHT: 69 IN | HEART RATE: 94 BPM | SYSTOLIC BLOOD PRESSURE: 106 MMHG | BODY MASS INDEX: 19.26 KG/M2 | RESPIRATION RATE: 18 BRPM | DIASTOLIC BLOOD PRESSURE: 68 MMHG

## 2019-10-23 DIAGNOSIS — Z23 NEED FOR VACCINATION: ICD-10-CM

## 2019-10-23 DIAGNOSIS — Z23 NEED FOR PROPHYLACTIC VACCINATION AND INOCULATION AGAINST INFLUENZA: ICD-10-CM

## 2019-10-23 DIAGNOSIS — Z29.89 NEED FOR PROPHYLAXIS AGAINST URINARY TRACT INFECTION: ICD-10-CM

## 2019-10-23 DIAGNOSIS — Z78.0 ASYMPTOMATIC MENOPAUSAL STATE: ICD-10-CM

## 2019-10-23 DIAGNOSIS — G35 MS (MULTIPLE SCLEROSIS) (H): ICD-10-CM

## 2019-10-23 DIAGNOSIS — N39.0 COMPLICATED UTI (URINARY TRACT INFECTION): Primary | ICD-10-CM

## 2019-10-23 LAB
ALBUMIN UR-MCNC: 100 MG/DL
APPEARANCE UR: CLEAR
BILIRUB UR QL STRIP: NEGATIVE
COLOR UR AUTO: YELLOW
GLUCOSE UR STRIP-MCNC: NEGATIVE MG/DL
HGB UR QL STRIP: NEGATIVE
KETONES UR STRIP-MCNC: NEGATIVE MG/DL
LEUKOCYTE ESTERASE UR QL STRIP: ABNORMAL
NITRATE UR QL: NEGATIVE
NON-SQ EPI CELLS #/AREA URNS LPF: ABNORMAL /LPF
PH UR STRIP: 7 PH (ref 5–7)
RBC #/AREA URNS AUTO: ABNORMAL /HPF
SOURCE: ABNORMAL
SP GR UR STRIP: 1.01 (ref 1–1.03)
UROBILINOGEN UR STRIP-ACNC: 0.2 EU/DL (ref 0.2–1)
WBC #/AREA URNS AUTO: ABNORMAL /HPF

## 2019-10-23 PROCEDURE — 90732 PPSV23 VACC 2 YRS+ SUBQ/IM: CPT | Performed by: PHYSICIAN ASSISTANT

## 2019-10-23 PROCEDURE — 36415 COLL VENOUS BLD VENIPUNCTURE: CPT | Performed by: PSYCHIATRY & NEUROLOGY

## 2019-10-23 PROCEDURE — G0008 ADMIN INFLUENZA VIRUS VAC: HCPCS | Performed by: PHYSICIAN ASSISTANT

## 2019-10-23 PROCEDURE — 87086 URINE CULTURE/COLONY COUNT: CPT | Performed by: PHYSICIAN ASSISTANT

## 2019-10-23 PROCEDURE — 99214 OFFICE O/P EST MOD 30 MIN: CPT | Mod: 25 | Performed by: PHYSICIAN ASSISTANT

## 2019-10-23 PROCEDURE — G0009 ADMIN PNEUMOCOCCAL VACCINE: HCPCS | Performed by: PHYSICIAN ASSISTANT

## 2019-10-23 PROCEDURE — 81001 URINALYSIS AUTO W/SCOPE: CPT | Performed by: PHYSICIAN ASSISTANT

## 2019-10-23 PROCEDURE — 87088 URINE BACTERIA CULTURE: CPT | Performed by: PHYSICIAN ASSISTANT

## 2019-10-23 PROCEDURE — 90682 RIV4 VACC RECOMBINANT DNA IM: CPT | Performed by: PHYSICIAN ASSISTANT

## 2019-10-23 PROCEDURE — 80048 BASIC METABOLIC PNL TOTAL CA: CPT | Performed by: PSYCHIATRY & NEUROLOGY

## 2019-10-23 RX ORDER — NITROFURANTOIN 25; 75 MG/1; MG/1
100 CAPSULE ORAL DAILY
Qty: 90 CAPSULE | Refills: 3 | Status: SHIPPED | OUTPATIENT
Start: 2019-10-23 | End: 2020-07-03

## 2019-10-23 RX ORDER — CEFDINIR 300 MG/1
300 CAPSULE ORAL 2 TIMES DAILY
Qty: 28 CAPSULE | Refills: 0 | Status: SHIPPED | OUTPATIENT
Start: 2019-10-23 | End: 2019-11-06

## 2019-10-23 ASSESSMENT — MIFFLIN-ST. JEOR: SCORE: 1200.09
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
## 2019-10-23 NOTE — NURSING NOTE
Prior to immunization administration, verified patients identity using patient s name and date of birth. Please see Immunization Activity for additional information.     Screening Questionnaire for Adult Immunization    Are you sick today?   No   Do you have allergies to medications, food, a vaccine component or latex?   No   Have you ever had a serious reaction after receiving a vaccination?   No   Do you have a long-term health problem with heart disease, lung disease, asthma, kidney disease, metabolic disease (e.g. diabetes), anemia, or other blood disorder?   No   Do you have cancer, leukemia, HIV/AIDS, or any other immune system problem?   No   In the past 3 months, have you taken medications that affect  your immune system, such as prednisone, other steroids, or anticancer drugs; drugs for the treatment of rheumatoid arthritis, Crohn s disease, or psoriasis; or have you had radiation treatments?   No   Have you had a seizure, or a brain or other nervous system problem?   No   During the past year, have you received a transfusion of blood or blood     products, or been given immune (gamma) globulin or antiviral drug?   No   For women: Are you pregnant or is there a chance you could become        pregnant during the next month?   No   Have you received any vaccinations in the past 4 weeks?   No     Immunization questionnaire answers were all negative.        Per orders of Ayesha Hamilton PA-C, injection of Pneumococcal Vaccine Polyvalent given by Sarah Smith MA. Patient instructed to remain in clinic for 15 minutes afterwards, and to report any adverse reaction to me immediately.       Screening performed by Sarah Smith MA on 10/23/2019 at 3:40 PM.  Sarah Smith MA on 10/23/2019 at 3:40 PM

## 2019-10-24 LAB
ANION GAP SERPL CALCULATED.3IONS-SCNC: 6 MMOL/L (ref 3–14)
BACTERIA SPEC CULT: ABNORMAL
BUN SERPL-MCNC: 22 MG/DL (ref 7–30)
CALCIUM SERPL-MCNC: 9.4 MG/DL (ref 8.5–10.1)
CHLORIDE SERPL-SCNC: 104 MMOL/L (ref 94–109)
CO2 SERPL-SCNC: 29 MMOL/L (ref 20–32)
CREAT SERPL-MCNC: 1.3 MG/DL (ref 0.52–1.04)
GFR SERPL CREATININE-BSD FRML MDRD: 43 ML/MIN/{1.73_M2}
GLUCOSE SERPL-MCNC: 81 MG/DL (ref 70–99)
POTASSIUM SERPL-SCNC: 4.1 MMOL/L (ref 3.4–5.3)
SODIUM SERPL-SCNC: 139 MMOL/L (ref 133–144)
SPECIMEN SOURCE: ABNORMAL

## 2019-10-25 NOTE — RESULT ENCOUNTER NOTE
Hi Marylee,    I just wanted to let you know that your lab results have been reviewed and are attached.    -Urine culture is abnormal and grew out bacteria that are sensitive to the antibiotic you have been given. Complete the medication as prescribed and if you experience new, worsening or persistent symptoms, you should call or return for a recheck.    Please let me know if you have any questions.    Sincerely,    Ayesha Hamilton PA-C    [address] [address] [phone] (p)

## 2019-11-01 DIAGNOSIS — F32.5 MAJOR DEPRESSION IN COMPLETE REMISSION (H): ICD-10-CM

## 2019-11-01 NOTE — TELEPHONE ENCOUNTER
"Requested Prescriptions   Pending Prescriptions Disp Refills     buPROPion (WELLBUTRIN XL) 150 MG 24 hr tablet [Pharmacy Med Name: BUPROPION HCL  MG TABLET]  Last Written Prescription Date:  5/14/2019  Last Fill Quantity: 90 tablet,  # refills: 1   Last office visit: 10/23/2019 with prescribing provider:  Alfonso   Future Office Visit:     90 tablet 1     Sig: TAKE 1 TABLET (150 MG) BY MOUTH EVERY MORNING       SSRIs Protocol Failed - 11/1/2019  9:52 AM        Failed - PHQ-9 score less than 5 in past 6 months     Please review last PHQ-9 score.   PHQ-9 SCORE 6/13/2018 11/27/2018 4/27/2019   PHQ-9 Total Score - - -   PHQ-9 Total Score MyChart - 5 (Mild depression) 10 (Moderate depression)   PHQ-9 Total Score 5 5 10     MARGARITO-7 SCORE 4/27/2019   Total Score 2 (minimal anxiety)   Total Score 2             Passed - Medication is Bupropion     If the medication is Bupropion (Wellbutrin), and the patient is taking for smoking cessation; OK to refill.          Passed - Medication is active on med list        Passed - Patient is age 18 or older        Passed - No active pregnancy on record        Passed - No positive pregnancy test in last 12 months        Passed - Recent (6 mo) or future (30 days) visit within the authorizing provider's specialty     Patient had office visit in the last 6 months or has a visit in the next 30 days with authorizing provider or within the authorizing provider's specialty.  See \"Patient Info\" tab in inbasket, or \"Choose Columns\" in Meds & Orders section of the refill encounter.               "

## 2019-11-04 RX ORDER — BUPROPION HYDROCHLORIDE 150 MG/1
150 TABLET ORAL EVERY MORNING
Qty: 90 TABLET | Refills: 1 | Status: SHIPPED | OUTPATIENT
Start: 2019-11-04 | End: 2020-04-29

## 2019-11-04 NOTE — TELEPHONE ENCOUNTER
Routing refill request to provider for review/approval because:  PHQ-9 >4, and out of date.     Message left for patient to call the clinic back to complete a PHQ-9 over the phone.

## 2019-12-18 DIAGNOSIS — F32.5 MAJOR DEPRESSION IN COMPLETE REMISSION (H): ICD-10-CM

## 2019-12-18 RX ORDER — SERTRALINE HYDROCHLORIDE 100 MG/1
TABLET, FILM COATED ORAL
Qty: 135 TABLET | Refills: 1 | Status: SHIPPED | OUTPATIENT
Start: 2019-12-18 | End: 2020-07-03

## 2019-12-18 NOTE — TELEPHONE ENCOUNTER
"Requested Prescriptions   Pending Prescriptions Disp Refills     sertraline (ZOLOFT) 100 MG tablet [Pharmacy Med Name: SERTRALINE  MG TABLET]  Last Written Prescription Date:  11/27/2018  Last Fill Quantity: 135 tablet,  # refills: 1   Last office visit: 10/23/2019 with prescribing provider:  Alfonso   Future Office Visit:     135 tablet 1     Sig: TAKE 1.5 TABLETS BY MOUTH DAILY       SSRIs Protocol Failed - 12/18/2019  2:41 AM        Failed - PHQ-9 score less than 5 in past 6 months     Please review last PHQ-9 score.   PHQ-9 SCORE 6/13/2018 11/27/2018 4/27/2019   PHQ-9 Total Score - - -   PHQ-9 Total Score MyChart - 5 (Mild depression) 10 (Moderate depression)   PHQ-9 Total Score 5 5 10     MARGARITO-7 SCORE 4/27/2019   Total Score 2 (minimal anxiety)   Total Score 2             Passed - Medication is active on med list        Passed - Patient is age 18 or older        Passed - No active pregnancy on record        Passed - No positive pregnancy test in last 12 months        Passed - Recent (6 mo) or future (30 days) visit within the authorizing provider's specialty     Patient had office visit in the last 6 months or has a visit in the next 30 days with authorizing provider or within the authorizing provider's specialty.  See \"Patient Info\" tab in inbasket, or \"Choose Columns\" in Meds & Orders section of the refill encounter.               "

## 2019-12-18 NOTE — TELEPHONE ENCOUNTER
Routing refill request to provider for review/approval because:  Labs not current:  phq9  Sent patient a my chart message to complete phq9    REVA Enrique, RN  [facility]

## 2019-12-19 DIAGNOSIS — R30.0 DYSURIA: Primary | ICD-10-CM

## 2019-12-19 DIAGNOSIS — R82.90 NONSPECIFIC FINDING ON EXAMINATION OF URINE: ICD-10-CM

## 2019-12-19 LAB
ALBUMIN UR-MCNC: NEGATIVE MG/DL
APPEARANCE UR: CLEAR
BACTERIA #/AREA URNS HPF: ABNORMAL /HPF
BILIRUB UR QL STRIP: NEGATIVE
COLOR UR AUTO: YELLOW
GLUCOSE UR STRIP-MCNC: NEGATIVE MG/DL
HGB UR QL STRIP: ABNORMAL
KETONES UR STRIP-MCNC: NEGATIVE MG/DL
LEUKOCYTE ESTERASE UR QL STRIP: ABNORMAL
NITRATE UR QL: POSITIVE
NON-SQ EPI CELLS #/AREA URNS LPF: ABNORMAL /LPF
PH UR STRIP: 7 PH (ref 5–7)
RBC #/AREA URNS AUTO: ABNORMAL /HPF
SOURCE: ABNORMAL
SP GR UR STRIP: 1.01 (ref 1–1.03)
UROBILINOGEN UR STRIP-ACNC: 0.2 EU/DL (ref 0.2–1)
WBC #/AREA URNS AUTO: ABNORMAL /HPF

## 2019-12-19 PROCEDURE — 87086 URINE CULTURE/COLONY COUNT: CPT | Performed by: PHYSICIAN ASSISTANT

## 2019-12-19 PROCEDURE — 87088 URINE BACTERIA CULTURE: CPT | Performed by: PHYSICIAN ASSISTANT

## 2019-12-19 PROCEDURE — 81001 URINALYSIS AUTO W/SCOPE: CPT | Performed by: PHYSICIAN ASSISTANT

## 2019-12-19 PROCEDURE — 87186 SC STD MICRODIL/AGAR DIL: CPT | Performed by: PHYSICIAN ASSISTANT

## 2019-12-19 NOTE — LETTER
December 26, 2019      Marylee Woods  [address]  [facility] [address]        Dear ,    We are writing to inform you of your test results.    SENSITIVE TO CIPROFLOXIN   MAY START MEDICATION CIPRO 500MG PO TWICE DAILY   WATCH FOR ANOREXIA NAUSEA VOMITING OR DIARRHEA  AND RASH   WATCH FOR DIZZINESS   TAKE WITH FOOD   NO VITAMINS CALCIUM IRON OR ANTACIDS    Resulted Orders   Urine Culture Aerobic Bacterial   Result Value Ref Range    Specimen Description Midstream Urine     Culture Micro >100,000 colonies/mL  Pseudomonas aeruginosa   (A)        If you have any questions or concerns, please call the clinic at the number listed above.       Sincerely,      Dr. Dylan Rocha

## 2019-12-20 ENCOUNTER — TELEPHONE (OUTPATIENT)
Dept: FAMILY MEDICINE | Facility: CLINIC | Age: 64
End: 2019-12-20

## 2019-12-20 DIAGNOSIS — N30.00 ACUTE CYSTITIS WITHOUT HEMATURIA: Primary | ICD-10-CM

## 2019-12-20 RX ORDER — CEFDINIR 300 MG/1
300 CAPSULE ORAL 2 TIMES DAILY
Qty: 14 CAPSULE | Refills: 0 | Status: SHIPPED | OUTPATIENT
Start: 2019-12-20 | End: 2020-03-11

## 2019-12-20 NOTE — TELEPHONE ENCOUNTER
Please call patient with her results:    - Your urine sample appears positive for an infection. I have sent an antibiotic to your pharmacy. Take 1 capsule twice daily (with food) for the next 7 days. It's ok to stop taking your Macrobid while your on this different antibiotic.

## 2019-12-24 DIAGNOSIS — R30.0 DYSURIA: Primary | ICD-10-CM

## 2019-12-24 LAB
BACTERIA SPEC CULT: ABNORMAL
SPECIMEN SOURCE: ABNORMAL

## 2019-12-24 RX ORDER — CIPROFLOXACIN 500 MG/1
500 TABLET, FILM COATED ORAL 2 TIMES DAILY
Qty: 20 TABLET | Refills: 0 | Status: SHIPPED | OUTPATIENT
Start: 2019-12-24 | End: 2020-03-11

## 2020-01-20 DIAGNOSIS — I10 ESSENTIAL HYPERTENSION, BENIGN: ICD-10-CM

## 2020-01-24 NOTE — TELEPHONE ENCOUNTER
Routing refill request to provider for review/approval because:  Labs out of range:    Creatinine   Date Value Ref Range Status   10/23/2019 1.30 (H) 0.52 - 1.04 mg/dL Final

## 2020-01-26 RX ORDER — HYDROCHLOROTHIAZIDE 12.5 MG/1
12.5 TABLET ORAL DAILY
Qty: 90 TABLET | Refills: 0 | Status: SHIPPED | OUTPATIENT
Start: 2020-01-26 | End: 2020-04-24

## 2020-01-26 NOTE — TELEPHONE ENCOUNTER
Will fill for short term, but can you let her know she does need to come in for appointment for follow from kidney tests that were ordered last visit in October?   Thanks,  Dr. Carolina Pulliam MD/[facility]

## 2020-02-18 ENCOUNTER — TRANSFERRED RECORDS (OUTPATIENT)
Dept: HEALTH INFORMATION MANAGEMENT | Facility: CLINIC | Age: 65
End: 2020-02-18

## 2020-02-20 ENCOUNTER — TRANSFERRED RECORDS (OUTPATIENT)
Dept: HEALTH INFORMATION MANAGEMENT | Facility: CLINIC | Age: 65
End: 2020-02-20

## 2020-03-11 ENCOUNTER — OFFICE VISIT (OUTPATIENT)
Dept: FAMILY MEDICINE | Facility: CLINIC | Age: 65
End: 2020-03-11
Payer: MEDICARE

## 2020-03-11 VITALS
RESPIRATION RATE: 18 BRPM | SYSTOLIC BLOOD PRESSURE: 108 MMHG | DIASTOLIC BLOOD PRESSURE: 64 MMHG | TEMPERATURE: 99.2 F | HEART RATE: 81 BPM | OXYGEN SATURATION: 96 %

## 2020-03-11 DIAGNOSIS — Z12.31 VISIT FOR SCREENING MAMMOGRAM: ICD-10-CM

## 2020-03-11 DIAGNOSIS — G35 MS (MULTIPLE SCLEROSIS) (H): ICD-10-CM

## 2020-03-11 DIAGNOSIS — J18.9 PNEUMONIA OF LEFT UPPER LOBE DUE TO INFECTIOUS ORGANISM: Primary | ICD-10-CM

## 2020-03-11 DIAGNOSIS — N18.30 CKD (CHRONIC KIDNEY DISEASE) STAGE 3, GFR 30-59 ML/MIN (H): ICD-10-CM

## 2020-03-11 DIAGNOSIS — N39.0 FREQUENT UTI: ICD-10-CM

## 2020-03-11 PROCEDURE — 99214 OFFICE O/P EST MOD 30 MIN: CPT | Performed by: FAMILY MEDICINE

## 2020-03-11 RX ORDER — AZITHROMYCIN 250 MG/1
TABLET, FILM COATED ORAL
Qty: 6 TABLET | Refills: 0 | Status: SHIPPED | OUTPATIENT
Start: 2020-03-11 | End: 2020-07-03

## 2020-03-11 NOTE — PROGRESS NOTES
Subjective     Marylee Woods is a 64 year old female who presents to clinic today for the following health issues:    HPI   Acute Illness   Acute illness concerns: Cough - Worse at night  Onset: x 2 weeks    Fever: YES    Chills/Sweats: no     Headache (location?): YES    Sinus Pressure:YES    Conjunctivitis:  no    Ear Pain: no    Rhinorrhea: no     Congestion: no     Sore Throat: YES     Cough: YES - Dry and barky    Wheeze: no     Decreased Appetite: YES    Nausea: no     Vomiting: no     Diarrhea:  no     Dysuria/Freq.: no     Fatigue/Achiness: YES    Sick/Strep Exposure: YES     Therapies Tried and outcome:     64 year old well known to me, wheelchair bound and with MS.  Cough started 2 weeks ago.  Worsened and fevers x 1 week.  Then yesterday improved.  Still tight.  Last fever - last Sunday.  No pain in chest.    Lots of coughing at night.    Taken aspirin for fever.  Used dayquil - didn't help.     sick as well.    Quit smoking mostly - rarest use.    Also - for future UTIs could we place standing orders?    GFR Estimate   Date Value Ref Range Status   10/23/2019 43 (L) >60 mL/min/[1.73_m2] Final     Comment:     Non  GFR Calc  Starting 12/18/2018, serum creatinine based estimated GFR (eGFR) will be   calculated using the Chronic Kidney Disease Epidemiology Collaboration   (CKD-EPI) equation.     11/12/2018 67 >60 mL/min/1.7m2 Final     Comment:     Non  GFR Calc   11/07/2018 66 >60 mL/min/1.7m2 Final     Comment:     Non  GFR Calc   11/03/2018 55 (L) >60 mL/min/1.7m2 Final     Comment:     Non  GFR Calc   11/02/2018 53 (L) >60 mL/min/1.7m2 Final     Comment:     Non  GFR Calc     GFR Estimate If Black   Date Value Ref Range Status   10/23/2019 50 (L) >60 mL/min/[1.73_m2] Final     Comment:      GFR Calc  Starting 12/18/2018, serum creatinine based estimated GFR (eGFR) will be   calculated using the Chronic  Kidney Disease Epidemiology Collaboration   (CKD-EPI) equation.     11/12/2018 81 >60 mL/min/1.7m2 Final     Comment:      GFR Calc   11/07/2018 79 >60 mL/min/1.7m2 Final     Comment:      GFR Calc   11/03/2018 67 >60 mL/min/1.7m2 Final     Comment:      GFR Calc   11/02/2018 64 >60 mL/min/1.7m2 Final     Comment:      GFR Calc       Reviewed and updated as needed this visit by Provider         Review of Systems   Constitutional: Positive for activity change, appetite change, fatigue and fever. Negative for chills.   HENT: Positive for congestion, rhinorrhea and sore throat.    Respiratory: Positive for chest tightness and wheezing. Negative for shortness of breath.    Cardiovascular: Negative.    Gastrointestinal: Negative.    Endocrine: Negative.    Breasts:  negative.    Genitourinary: Negative.    Musculoskeletal: Positive for arthralgias.   Psychiatric/Behavioral: Negative.             Objective    /64   Pulse 81   Temp 99.2  F (37.3  C) (Tympanic)   Resp 18   LMP  (LMP Unknown)   SpO2 96%   There is no height or weight on file to calculate BMI.  Physical Exam  Vitals signs reviewed.   Constitutional:       General: She is not in acute distress.     Appearance: Normal appearance. She is ill-appearing. She is not toxic-appearing or diaphoretic.   HENT:      Head: Normocephalic and atraumatic.      Nose: Nose normal.      Mouth/Throat:      Mouth: Mucous membranes are moist.   Neck:      Musculoskeletal: Normal range of motion and neck supple.   Cardiovascular:      Rate and Rhythm: Normal rate and regular rhythm.   Pulmonary:      Effort: Pulmonary effort is normal. No respiratory distress.      Breath sounds: No stridor. Rhonchi and rales present. No wheezing.      Comments: Upper left lobe posterior  Chest:      Chest wall: No tenderness.   Abdominal:      General: Abdomen is flat.      Palpations: Abdomen is soft.   Musculoskeletal:  Normal range of motion.   Skin:     General: Skin is warm and dry.   Neurological:      General: No focal deficit present.      Mental Status: She is alert and oriented to person, place, and time.   Psychiatric:         Mood and Affect: Mood normal.         Behavior: Behavior normal.         Diagnostic Test Results:  Labs reviewed in Epic        Assessment & Plan     Marylee was seen today for uri.    Diagnoses and all orders for this visit:    Pneumonia of left upper lobe due to infectious organism (H)  -     azithromycin (ZITHROMAX Z-MELISSA) 250 MG tablet; Take 2 tablets (500 mg) by mouth daily for 1 day, THEN 1 tablet (250 mg) daily for 1 day.    CKD (chronic kidney disease) stage 3, GFR 30-59 ml/min (H)    MS (multiple sclerosis) (H)    Frequent UTI  -     UA with Microscopic reflex to Culture; Standing    Visit for screening mammogram  -     *MA Screening Digital Bilateral; Future           Patient Instructions     1. For your pneumonia:  Use z-melissa until all gone.  Keep up the good work with not smoking.  Let us know right away if fevers return.    2. For your kidneys:  No ibuprofen, aspirin.  We will recheck this at your preop on 3/27th.  Stay hydrated.  We might adjust your blood pressure medication at that visit if still showing kidney strain.    3. We'll schedule your mammogram at your visit on the 27th.          Patient Education     Treating Pneumonia  Pneumonia is an infection of one or both of the lungs. Pneumonia:    Is usually caused by either a virus or a bacteria    Can be very serious, especially in infants, young children, and older adults. It s also serious for those with other long-term health problems or weakened immune systems.    Is sometimes treated at home and sometimes in the hospital    Antibiotic medicines  Antibiotics may be prescribed for pneumonia caused by bacteria. They may be pills (oral medicines), or shots (injections). Or they may be given by IV (intravenously) into a vein. If you  are taking oral medicines at home:    Fill your prescription and start taking your medicine as soon as you can.    You will likely start to feel better in a day or 2, but don t stop taking the antibiotic.    Use a pill organizer to help you remember to take your medicine.    Let your healthcare provider know if you have side effects.    Take your medicine exactly as directed on the label. Talk to your provider or pharmacist if you have any questions.  Antiviral medicines  Antiviral medicine may be prescribed for pneumonia caused by a virus. For example, antiviral medicine may be prescribed for pneumonia caused by the flu virus. Antibiotics do not work against viruses. If you are taking antiviral medicine at home:    Fill your prescription and start taking your medicine as soon as you can.    Talk with your provider or pharmacist about possible side effects.    Take the medicine exactly as instructed.  To relieve symptoms  There are many medicines that can help relieve symptoms of pneumonia. Some are prescription and some are over-the-counter.  Your healthcare provider may recommend:    Acetaminophen or ibuprofen to lower your fever and to lessen headache or other pain    Cough medicine to loosen mucus or to reduce coughing  Make sure you check with your healthcare provider or pharmacist before taking any over-the-counter medicines.  Special treatments  If you are hospitalized for pneumonia, you may have other therapies, including:    Inhaled medicines to help with breathing or chest congestion    Supplemental oxygen to increase low oxygen levels  Drink fluids and eat healthy  You should eat healthy to help your body fight the infection. Drinking a lot of fluids helps to replace fluids lost from fever and to loosen mucus in your chest.    Diet. Make healthy food choices, including fruits and vegetables, lean meats and other proteins, 100% whole grain and low- or no-fat dairy products.    Fluids. Drink at least 6 to 8  tall glasses a day. Water and 100% fruit or vegetable juice are best.  Get plenty of rest and sleep  You may be more tired than usual for a while. It is important to get enough sleep at night. It s also important to rest during the day. Talk with your healthcare provider if coughing or other symptoms are interfering with your sleep.  Preventing the spread of germs  The best thing you can do to prevent spreading germs is to wash your hands often. You should:    Rub your hand with soap and water for 20 to 30 seconds.    Clean in between your fingers, the backs of your hands, and around your nails.    Dry your hands on a separate towel or use paper towels.  You should also:    Keep alcohol-based hand  nearby.    Make sure you also clean surfaces that you touch. Use a product that kills all types of germs.    Stay away from others until you are feeling better.  When to call your healthcare provider  Call your healthcare provider if you have any of the following:    Symptoms get worse    Fever continues    Shortness of breath gets worse    Increased mucus or mucus that is darker in color    Coughing gets worse    Lips or fingers are bluish in color    Side effects from your medicine   Date Last Reviewed: 12/1/2016 2000-2019 [employer]. [address] [place] [address], [place] [address]. All rights reserved. This information is not intended as a substitute for professional medical care. Always follow your healthcare professional's instructions.               Return in about 3 weeks (around 4/1/2020) for pre-op.    Carolina Pulliam MD  [facility]

## 2020-03-11 NOTE — PATIENT INSTRUCTIONS
1. For your pneumonia:  Use z-melissa until all gone.  Keep up the good work with not smoking.  Let us know right away if fevers return.    2. For your kidneys:  No ibuprofen, aspirin.  We will recheck this at your preop on 3/27th.  Stay hydrated.  We might adjust your blood pressure medication at that visit if still showing kidney strain.    3. We'll schedule your mammogram at your visit on the 27th.          Patient Education     Treating Pneumonia  Pneumonia is an infection of one or both of the lungs. Pneumonia:    Is usually caused by either a virus or a bacteria    Can be very serious, especially in infants, young children, and older adults. It s also serious for those with other long-term health problems or weakened immune systems.    Is sometimes treated at home and sometimes in the hospital    Antibiotic medicines  Antibiotics may be prescribed for pneumonia caused by bacteria. They may be pills (oral medicines), or shots (injections). Or they may be given by IV (intravenously) into a vein. If you are taking oral medicines at home:    Fill your prescription and start taking your medicine as soon as you can.    You will likely start to feel better in a day or 2, but don t stop taking the antibiotic.    Use a pill organizer to help you remember to take your medicine.    Let your healthcare provider know if you have side effects.    Take your medicine exactly as directed on the label. Talk to your provider or pharmacist if you have any questions.  Antiviral medicines  Antiviral medicine may be prescribed for pneumonia caused by a virus. For example, antiviral medicine may be prescribed for pneumonia caused by the flu virus. Antibiotics do not work against viruses. If you are taking antiviral medicine at home:    Fill your prescription and start taking your medicine as soon as you can.    Talk with your provider or pharmacist about possible side effects.    Take the medicine exactly as instructed.  To relieve  symptoms  There are many medicines that can help relieve symptoms of pneumonia. Some are prescription and some are over-the-counter.  Your healthcare provider may recommend:    Acetaminophen or ibuprofen to lower your fever and to lessen headache or other pain    Cough medicine to loosen mucus or to reduce coughing  Make sure you check with your healthcare provider or pharmacist before taking any over-the-counter medicines.  Special treatments  If you are hospitalized for pneumonia, you may have other therapies, including:    Inhaled medicines to help with breathing or chest congestion    Supplemental oxygen to increase low oxygen levels  Drink fluids and eat healthy  You should eat healthy to help your body fight the infection. Drinking a lot of fluids helps to replace fluids lost from fever and to loosen mucus in your chest.    Diet. Make healthy food choices, including fruits and vegetables, lean meats and other proteins, 100% whole grain and low- or no-fat dairy products.    Fluids. Drink at least 6 to 8 tall glasses a day. Water and 100% fruit or vegetable juice are best.  Get plenty of rest and sleep  You may be more tired than usual for a while. It is important to get enough sleep at night. It s also important to rest during the day. Talk with your healthcare provider if coughing or other symptoms are interfering with your sleep.  Preventing the spread of germs  The best thing you can do to prevent spreading germs is to wash your hands often. You should:    Rub your hand with soap and water for 20 to 30 seconds.    Clean in between your fingers, the backs of your hands, and around your nails.    Dry your hands on a separate towel or use paper towels.  You should also:    Keep alcohol-based hand  nearby.    Make sure you also clean surfaces that you touch. Use a product that kills all types of germs.    Stay away from others until you are feeling better.  When to call your healthcare provider  Call  your healthcare provider if you have any of the following:    Symptoms get worse    Fever continues    Shortness of breath gets worse    Increased mucus or mucus that is darker in color    Coughing gets worse    Lips or fingers are bluish in color    Side effects from your medicine   Date Last Reviewed: 12/1/2016 2000-2019 [employer]. [address] [place] [id number]. All rights reserved. This information is not intended as a substitute for professional medical care. Always follow your healthcare professional's instructions.

## 2020-03-15 ENCOUNTER — HEALTH MAINTENANCE LETTER (OUTPATIENT)
Age: 65
End: 2020-03-15

## 2020-03-17 ASSESSMENT — ENCOUNTER SYMPTOMS
ARTHRALGIAS: 1
PSYCHIATRIC NEGATIVE: 1
CHEST TIGHTNESS: 1
RHINORRHEA: 1
CARDIOVASCULAR NEGATIVE: 1
ENDOCRINE NEGATIVE: 1
APPETITE CHANGE: 1
SORE THROAT: 1
SHORTNESS OF BREATH: 0
GASTROINTESTINAL NEGATIVE: 1
FEVER: 1
FATIGUE: 1
ACTIVITY CHANGE: 1
CHILLS: 0
WHEEZING: 1

## 2020-04-24 DIAGNOSIS — I10 ESSENTIAL HYPERTENSION, BENIGN: ICD-10-CM

## 2020-04-24 RX ORDER — HYDROCHLOROTHIAZIDE 12.5 MG/1
TABLET ORAL
Qty: 90 TABLET | Refills: 0 | Status: SHIPPED | OUTPATIENT
Start: 2020-04-24 | End: 2020-08-17

## 2020-04-26 DIAGNOSIS — F32.5 MAJOR DEPRESSION IN COMPLETE REMISSION (H): ICD-10-CM

## 2020-04-28 NOTE — TELEPHONE ENCOUNTER
Routing refill request to provider for review/approval because:  --PHQ-9 out of date and >4.  --Last Office Visit: 3/11/2020     MA  --Please contact patient PHQ-9 .  Thank you.    PHQ 6/13/2018 11/27/2018 4/27/2019   PHQ-9 Total Score 5 5 10   Q9: Thoughts of better off dead/self-harm past 2 weeks Not at all Not at all Several days   F/U: Thoughts of suicide or self-harm - - No   F/U: Safety concerns - - No

## 2020-04-28 NOTE — TELEPHONE ENCOUNTER
Called patient and stated she could not do PHQ9 at this moment but will call back soon.    Latasha Salazar MA

## 2020-04-29 RX ORDER — BUPROPION HYDROCHLORIDE 150 MG/1
150 TABLET ORAL EVERY MORNING
Qty: 90 TABLET | Refills: 1 | Status: SHIPPED | OUTPATIENT
Start: 2020-04-29 | End: 2020-09-30

## 2020-05-01 DIAGNOSIS — K21.9 GASTROESOPHAGEAL REFLUX DISEASE, ESOPHAGITIS PRESENCE NOT SPECIFIED: ICD-10-CM

## 2020-05-01 RX ORDER — OMEPRAZOLE 40 MG/1
CAPSULE, DELAYED RELEASE ORAL
Qty: 90 CAPSULE | Refills: 2 | Status: SHIPPED | OUTPATIENT
Start: 2020-05-01 | End: 2021-04-14

## 2020-05-15 ENCOUNTER — VIRTUAL VISIT (OUTPATIENT)
Dept: URGENT CARE | Facility: CLINIC | Age: 65
End: 2020-05-15
Payer: MEDICARE

## 2020-05-15 ENCOUNTER — NURSE TRIAGE (OUTPATIENT)
Dept: NURSING | Facility: CLINIC | Age: 65
End: 2020-05-15

## 2020-05-15 DIAGNOSIS — R05.9 COUGH: Primary | ICD-10-CM

## 2020-05-15 PROCEDURE — 99441 ZZC PHYSICIAN TELEPHONE EVALUATION 5-10 MIN GOVT: CPT | Mod: GC | Performed by: STUDENT IN AN ORGANIZED HEALTH CARE EDUCATION/TRAINING PROGRAM

## 2020-05-15 NOTE — TELEPHONE ENCOUNTER
"Pt calling.  She has had a cough, headache and GI issues.  She has MS. Her  is a hospice chaplain where places have had COVID-19 outbreak.  She has tried [facility] multiple times and has been unsuccessful therefore a virtual urgent care visit was scheduled.    Reason for Disposition    HIGH RISK patient (e.g., age > 64 years, diabetes, heart or lung disease, weak immune system)    Additional Information    Negative: SEVERE difficulty breathing (e.g., struggling for each breath, speaks in single words)    Negative: Difficult to awaken or acting confused (e.g., disoriented, slurred speech)    Negative: Bluish (or gray) lips or face now    Negative: Shock suspected (e.g., cold/pale/clammy skin, too weak to stand, low BP, rapid pulse)    Negative: Sounds like a life-threatening emergency to the triager    Negative: SEVERE or constant chest pain (Exception: mild central chest pain, present only when coughing)    Negative: MODERATE difficulty breathing (e.g., speaks in phrases, SOB even at rest, pulse 100-120)    Negative: Patient sounds very sick or weak to the triager    Negative: MILD difficulty breathing (e.g., minimal/no SOB at rest, SOB with walking, pulse <100)    Negative: Chest pain    Negative: Fever > 103 F (39.4 C)    Negative: [1] Fever > 101 F (38.3 C) AND [2] age > 60    Negative: [1] Fever > 100.0 F (37.8 C) AND [2] bedridden (e.g., nursing home patient, CVA, chronic illness, recovering from surgery)    Answer Assessment - Initial Assessment Questions  1. COVID-19 DIAGNOSIS: \"Who made your Coronavirus (COVID-19) diagnosis?\" \"Was it confirmed by a positive lab test?\" If not diagnosed by a HCP, ask \"Are there lots of cases (community spread) where you live?\" (See public health department website, if unsure)    * MAJOR community spread: high number of cases; numbers of cases are increasing; many people hospitalized.    * MINOR community spread: low number of cases; not increasing; few or no people " "hospitalized       is a  in a hospice facility that has had some + COVID-19 cases  2. ONSET: \"When did the COVID-19 symptoms start?\"       Last weekend. In the morning it is worse.  3. WORST SYMPTOM: \"What is your worst symptom?\" (e.g., cough, fever, shortness of breath, muscle aches)      cough  4. COUGH: \"Do you have a cough?\" If so, ask: \"How bad is the cough?\"        It is worst at night.  It doesn't wake her up but when trying to sleep it is bad.  Sometimes she has coughing fits that effects her breathing.  The cough is a dry cough  5. FEVER: \"Do you have a fever?\" If so, ask: \"What is your temperature, how was it measured, and when did it start?\"      No. She has been taking her temperature daily  6. RESPIRATORY STATUS: \"Describe your breathing?\" (e.g., shortness of breath, wheezing, unable to speak)       No issues except when she has a coughing fit  7. BETTER-SAME-WORSE: \"Are you getting better, staying the same or getting worse compared to yesterday?\"  If getting worse, ask, \"In what way?\"      She thinks her cough is better.  8. HIGH RISK DISEASE: \"Do you have any chronic medical problems?\" (e.g., asthma, heart or lung disease, weak immune system, etc.)      MS  9. PREGNANCY: \"Is there any chance you are pregnant?\" \"When was your last menstrual period?\"      na  10. OTHER SYMPTOMS: \"Do you have any other symptoms?\"  (e.g., runny nose, headache, sore throat, loss of smell)        Headache and GI issues    Protocols used: CORONAVIRUS (COVID-19) DIAGNOSED OR [id number] 4.22.20    rusty conley RN on 5/15/2020 at 1:21 PM    "
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
## 2020-05-15 NOTE — PATIENT INSTRUCTIONS
Your symptoms show that you may have coronavirus (COVID-19). This illness can cause fever, cough and trouble breathing. Many people get a mild case and get better on their own. Some people can get very sick.     Not all patients are tested for COVID-19. If you need to be tested, your care team will let you know.     How can I protect others?    Stay home and away from others (self-isolate) until:    At least 10 days have passed since your symptoms started. And     You've had no fever--and no medicine that reduces fever--for 3 full days (72 hours). And      Your other symptoms have resolved (gotten better).     During this time:    Stay in your own room (and use your own bathroom), if you can.    Stay away from others in your home. No hugging, kissing or shaking hands.    No visitors.    Don't go to work, school or anywhere else.     Clean  high touch  surfaces often (doorknobs, counters, handles, etc.). Use a household cleaning spray or wipes.    Cover your mouth and nose with a mask, tissue or wash cloth to avoid spreading germs.    Wash your hands and face often. Use soap and water.    For more tips, go to https://www.cdc.gov/coronavirus/2019-ncov/downloads/10Things.pdf.    How can I take care of myself?    1. Get lots of rest. Drink extra fluids (unless a doctor has told you not to).     2. Take Tylenol (acetaminophen) for fever or pain. If you have liver or kidney problems, ask your family doctor if it's okay to take Tylenol.     Adults can take either:     650 mg (two 325 mg pills) every 4 to 6 hours, or     1,000 mg (two 500 mg pills) every 8 hours as needed.     Note: Don't take more than 3,000 mg in one day.   Acetaminophen is found in many medicines (both prescribed and over-the-counter medicines). Read all labels to be sure you don't take too much.   For children, check the Tylenol bottle for the right dose. The dose is based on the child's age or weight.    3. If you have other health problems (like  cancer, heart failure, an organ transplant or severe kidney disease): Call your specialty clinic if you don't feel better in the next 2 days.    4. Know when to call 911: If your breathing is so bad that it keeps you from doing normal activities, call 911 or go to the emergency room. Tell them that you've been staying home and may have COVID-19.      What are the symptoms of COVID-19?     The most common symptoms are cough, fever and trouble breathing.     Less common symptoms include body aches, chills, diarrhea (loose, watery poops), fatigue (feeling very tired), headache, runny nose, sore throat and loss of smell.     COVID-19 can cause severe coughing (bronchitis) and lung infection (pneumonia).    How does it spread?     The virus may spread when a person coughs or sneezes into the air. The virus can travel about 6 feet this way, and it can live on surfaces.      Common  (household disinfectants) will kill the virus.    Who is at risk?  Anyone can catch COVID-19 if they're around someone who has the virus.    How can others protect themselves?     Stay away from people who have COVID-19 (or symptoms of COVID-19).    Wash hands often with soap and water. Or, use hand  with at least 60% alcohol.    Avoid touching the eyes, nose or mouth.     Wear a face mask when you go out in public, when sick or when caring for a sick person.      For more about COVID-19 and caring for yourself at home, please visit the CDC website at https://www.cdc.gov/coronavirus/2019-ncov/about/steps-when-sick.html.     To learn about care at [facility], go to [email or web address].    Below are the COVID-19 hotlines at the [facility] ([facility]). Interpreters are available.     For health questions: Call 372-946-3979 or 1-220.129.4586 (7 a.m. to 7 p.m.)    For questions about schools and childcare: Call 063-872-0659 or 1-313.944.9846 (7 a.m. to 7 p.m.)

## 2020-05-18 ENCOUNTER — OFFICE VISIT - HEALTHEAST (OUTPATIENT)
Dept: INTERNAL MEDICINE | Facility: CLINIC | Age: 65
End: 2020-05-18

## 2020-05-18 ENCOUNTER — AMBULATORY - HEALTHEAST (OUTPATIENT)
Dept: FAMILY MEDICINE | Facility: CLINIC | Age: 65
End: 2020-05-18

## 2020-05-18 DIAGNOSIS — Z11.59 ENCOUNTER FOR SCREENING FOR OTHER VIRAL DISEASES: ICD-10-CM

## 2020-05-18 DIAGNOSIS — R05.9 COUGH: ICD-10-CM

## 2020-05-19 ENCOUNTER — COMMUNICATION - HEALTHEAST (OUTPATIENT)
Dept: INTERNAL MEDICINE | Facility: CLINIC | Age: 65
End: 2020-05-19

## 2020-05-20 ASSESSMENT — PATIENT HEALTH QUESTIONNAIRE - PHQ9: SUM OF ALL RESPONSES TO PHQ QUESTIONS 1-9: 8

## 2020-06-16 DIAGNOSIS — N39.0 FREQUENT UTI: ICD-10-CM

## 2020-06-16 DIAGNOSIS — R82.90 NONSPECIFIC FINDING ON EXAMINATION OF URINE: Primary | ICD-10-CM

## 2020-06-16 LAB
ALBUMIN UR-MCNC: NEGATIVE MG/DL
APPEARANCE UR: CLEAR
BACTERIA #/AREA URNS HPF: ABNORMAL /HPF
BILIRUB UR QL STRIP: NEGATIVE
COLOR UR AUTO: YELLOW
GLUCOSE UR STRIP-MCNC: NEGATIVE MG/DL
HGB UR QL STRIP: NEGATIVE
KETONES UR STRIP-MCNC: NEGATIVE MG/DL
LEUKOCYTE ESTERASE UR QL STRIP: ABNORMAL
NITRATE UR QL: NEGATIVE
NON-SQ EPI CELLS #/AREA URNS LPF: ABNORMAL /LPF
PH UR STRIP: 7 PH (ref 5–7)
RBC #/AREA URNS AUTO: ABNORMAL /HPF
SOURCE: ABNORMAL
SP GR UR STRIP: 1.01 (ref 1–1.03)
UROBILINOGEN UR STRIP-ACNC: 0.2 EU/DL (ref 0.2–1)
WBC #/AREA URNS AUTO: ABNORMAL /HPF

## 2020-06-16 PROCEDURE — 87088 URINE BACTERIA CULTURE: CPT | Performed by: FAMILY MEDICINE

## 2020-06-16 PROCEDURE — 87086 URINE CULTURE/COLONY COUNT: CPT | Performed by: FAMILY MEDICINE

## 2020-06-16 PROCEDURE — 87186 SC STD MICRODIL/AGAR DIL: CPT | Performed by: FAMILY MEDICINE

## 2020-06-16 PROCEDURE — 81001 URINALYSIS AUTO W/SCOPE: CPT | Performed by: FAMILY MEDICINE

## 2020-06-16 NOTE — RESULT ENCOUNTER NOTE
Cathy Ms. Marinelli,  Your results came back and urine shows a nonspecific finding most suggestive of collection contamination.  Awaiting culture results to determine what is going on. If you have any further concerns please do not hesitate to contact us by message, phone or making an appointment.  Have a good day   Dr Jero RICO

## 2020-06-18 ENCOUNTER — TELEPHONE (OUTPATIENT)
Dept: FAMILY MEDICINE | Facility: CLINIC | Age: 65
End: 2020-06-18

## 2020-06-18 DIAGNOSIS — N39.0 URINARY TRACT INFECTION WITHOUT HEMATURIA, SITE UNSPECIFIED: Primary | ICD-10-CM

## 2020-06-18 RX ORDER — SULFAMETHOXAZOLE/TRIMETHOPRIM 800-160 MG
1 TABLET ORAL 2 TIMES DAILY
Qty: 10 TABLET | Refills: 0 | Status: SHIPPED | OUTPATIENT
Start: 2020-06-18 | End: 2020-07-03

## 2020-06-18 NOTE — TELEPHONE ENCOUNTER
Reason for Call:  Request for results:    Name of test or procedure: labs    Date of test of procedure: 6-16-20    Location of the test or procedure: [facility]    OK to leave the result message on voice mail or with a family member? YES    Phone number Patient can be reached at:  Home number on file [phone] (home)    Additional comments: patient is calling for her results    Call taken on 6/18/2020 at 11:38 AM by Jennifer Gutierrez

## 2020-06-18 NOTE — TELEPHONE ENCOUNTER
"Dr. Nogueira-Please sign if agree and may close encounter.    \"Notes recorded by Suzie Nogueira MD on 6/18/2020 at 12:25 PM CDT   Helalirio Ms. Marinelli,   Your results came back and culture growing some staph. Final sensitivities not known or back yet. We can do empirical bactrim twice a day with food 5 days until final sensitivities known. If agreeable to this please let us know. If you have any further concerns please do not hesitate to contact us by message, phone or making an appointment.   Have a good day   Dr Jero RICO \"      Writer called patient and reviewed message per Dr. Nogueira.    Patient verbalized understanding and would like to start Bactrim.  Pharmacy confirmed with patient.    Thank you!  BABATUNDE Sher, BSN, RN              "

## 2020-06-18 NOTE — RESULT ENCOUNTER NOTE
Cathy Ms. Marinelli,  Your results came back and culture growing some staph. Final sensitivities not known or back yet. We can do empirical bactrim twice a day with food 5 days until final sensitivities known. If agreeable to this please let us know. If you have any further concerns please do not hesitate to contact us by message, phone or making an appointment.  Have a good day   Dr Jero RICO

## 2020-06-22 ENCOUNTER — TRANSFERRED RECORDS (OUTPATIENT)
Dept: HEALTH INFORMATION MANAGEMENT | Facility: CLINIC | Age: 65
End: 2020-06-22

## 2020-06-22 LAB
BACTERIA SPEC CULT: ABNORMAL
BACTERIA SPEC CULT: ABNORMAL
SPECIMEN SOURCE: ABNORMAL

## 2020-06-23 ENCOUNTER — HOSPITAL ENCOUNTER (OUTPATIENT)
Dept: MAMMOGRAPHY | Facility: CLINIC | Age: 65
Discharge: HOME | End: 2020-06-23
Attending: FAMILY MEDICINE | Admitting: FAMILY MEDICINE
Payer: MEDICARE

## 2020-06-23 DIAGNOSIS — Z12.31 VISIT FOR SCREENING MAMMOGRAM: ICD-10-CM

## 2020-06-23 PROCEDURE — 77067 SCR MAMMO BI INCL CAD: CPT

## 2020-06-24 NOTE — RESULT ENCOUNTER NOTE
Hello Marylee:  Good news!  Your mammogram looks normal and reassuring.  The breast center will send along a more detailed report.  Let us know if you have any questions about this.  Best,  Dr. Carolina Pulliam MD  [facility]  [phone]

## 2020-06-26 ENCOUNTER — TRANSFERRED RECORDS (OUTPATIENT)
Dept: HEALTH INFORMATION MANAGEMENT | Facility: CLINIC | Age: 65
End: 2020-06-26

## 2020-06-29 ENCOUNTER — TRANSFERRED RECORDS (OUTPATIENT)
Dept: HEALTH INFORMATION MANAGEMENT | Facility: CLINIC | Age: 65
End: 2020-06-29

## 2020-06-29 LAB
ALT SERPL-CCNC: 18 IU/L (ref 0–32)
AST SERPL-CCNC: 30 IU/L (ref 0–40)
CREAT SERPL-MCNC: 1.34 MG/DL (ref 0.57–1)
GFR SERPL CREATININE-BSD FRML MDRD: 42 ML/MIN/1.73
TSH SERPL-ACNC: 2 UIU/ML (ref 0.45–4.5)

## 2020-06-30 DIAGNOSIS — J18.9 COMMUNITY ACQUIRED PNEUMONIA, UNSPECIFIED LATERALITY: ICD-10-CM

## 2020-06-30 RX ORDER — ALBUTEROL SULFATE 90 UG/1
1-2 AEROSOL, METERED RESPIRATORY (INHALATION) EVERY 4 HOURS PRN
Qty: 8.5 G | Refills: 0 | Status: SHIPPED | OUTPATIENT
Start: 2020-06-30 | End: 2020-07-03

## 2020-07-02 ENCOUNTER — TRANSFERRED RECORDS (OUTPATIENT)
Dept: HEALTH INFORMATION MANAGEMENT | Facility: CLINIC | Age: 65
End: 2020-07-02

## 2020-07-03 ENCOUNTER — OFFICE VISIT (OUTPATIENT)
Dept: FAMILY MEDICINE | Facility: CLINIC | Age: 65
End: 2020-07-03
Payer: MEDICARE

## 2020-07-03 VITALS
BODY MASS INDEX: 19.26 KG/M2 | HEART RATE: 77 BPM | SYSTOLIC BLOOD PRESSURE: 116 MMHG | WEIGHT: 130 LBS | DIASTOLIC BLOOD PRESSURE: 66 MMHG | HEIGHT: 69 IN | RESPIRATION RATE: 16 BRPM | OXYGEN SATURATION: 97 % | TEMPERATURE: 97.9 F

## 2020-07-03 DIAGNOSIS — B37.81 CANDIDA ESOPHAGITIS (H): ICD-10-CM

## 2020-07-03 DIAGNOSIS — F32.5 MAJOR DEPRESSION IN COMPLETE REMISSION (H): ICD-10-CM

## 2020-07-03 DIAGNOSIS — G35 MS (MULTIPLE SCLEROSIS) (H): ICD-10-CM

## 2020-07-03 DIAGNOSIS — Z87.891 FORMER TOBACCO USE: ICD-10-CM

## 2020-07-03 DIAGNOSIS — D69.6 THROMBOCYTOPENIA (H): ICD-10-CM

## 2020-07-03 DIAGNOSIS — H26.8 OTHER CATARACT OF RIGHT EYE: ICD-10-CM

## 2020-07-03 DIAGNOSIS — D64.9 ANEMIA, UNSPECIFIED TYPE: ICD-10-CM

## 2020-07-03 DIAGNOSIS — N18.30 CKD (CHRONIC KIDNEY DISEASE) STAGE 3, GFR 30-59 ML/MIN (H): ICD-10-CM

## 2020-07-03 DIAGNOSIS — D61.818 PANCYTOPENIA (H): ICD-10-CM

## 2020-07-03 DIAGNOSIS — R05.9 COUGH: ICD-10-CM

## 2020-07-03 DIAGNOSIS — Z01.818 PREOP GENERAL PHYSICAL EXAM: Primary | ICD-10-CM

## 2020-07-03 DIAGNOSIS — U07.1 COVID-19 VIRUS INFECTION: ICD-10-CM

## 2020-07-03 LAB
ALBUMIN UR-MCNC: NEGATIVE MG/DL
APPEARANCE UR: CLEAR
BILIRUB UR QL STRIP: NEGATIVE
COLOR UR AUTO: YELLOW
ERYTHROCYTE [DISTWIDTH] IN BLOOD BY AUTOMATED COUNT: 12.4 % (ref 10–15)
GLUCOSE UR STRIP-MCNC: NEGATIVE MG/DL
HCT VFR BLD AUTO: 28.8 % (ref 35–47)
HGB BLD-MCNC: 9.2 G/DL (ref 11.7–15.7)
HGB UR QL STRIP: NEGATIVE
KETONES UR STRIP-MCNC: NEGATIVE MG/DL
LEUKOCYTE ESTERASE UR QL STRIP: NEGATIVE
MCH RBC QN AUTO: 30.7 PG (ref 26.5–33)
MCHC RBC AUTO-ENTMCNC: 31.9 G/DL (ref 31.5–36.5)
MCV RBC AUTO: 96 FL (ref 78–100)
NITRATE UR QL: NEGATIVE
PH UR STRIP: 5.5 PH (ref 5–7)
PLATELET # BLD AUTO: 181 10E9/L (ref 150–450)
RBC # BLD AUTO: 3 10E12/L (ref 3.8–5.2)
RBC #/AREA URNS AUTO: NORMAL /HPF
SOURCE: NORMAL
SP GR UR STRIP: 1.01 (ref 1–1.03)
UROBILINOGEN UR STRIP-ACNC: 0.2 EU/DL (ref 0.2–1)
WBC # BLD AUTO: 3.6 10E9/L (ref 4–11)
WBC #/AREA URNS AUTO: NORMAL /HPF

## 2020-07-03 PROCEDURE — 85027 COMPLETE CBC AUTOMATED: CPT | Performed by: FAMILY MEDICINE

## 2020-07-03 PROCEDURE — 99000 SPECIMEN HANDLING OFFICE-LAB: CPT | Performed by: FAMILY MEDICINE

## 2020-07-03 PROCEDURE — 80048 BASIC METABOLIC PNL TOTAL CA: CPT | Performed by: FAMILY MEDICINE

## 2020-07-03 PROCEDURE — 93000 ELECTROCARDIOGRAM COMPLETE: CPT | Performed by: FAMILY MEDICINE

## 2020-07-03 PROCEDURE — 36415 COLL VENOUS BLD VENIPUNCTURE: CPT | Performed by: FAMILY MEDICINE

## 2020-07-03 PROCEDURE — 86769 SARS-COV-2 COVID-19 ANTIBODY: CPT | Mod: 90 | Performed by: FAMILY MEDICINE

## 2020-07-03 PROCEDURE — 99214 OFFICE O/P EST MOD 30 MIN: CPT | Mod: CS | Performed by: FAMILY MEDICINE

## 2020-07-03 PROCEDURE — 81001 URINALYSIS AUTO W/SCOPE: CPT | Performed by: FAMILY MEDICINE

## 2020-07-03 RX ORDER — SERTRALINE HYDROCHLORIDE 100 MG/1
100 TABLET, FILM COATED ORAL AT BEDTIME
Qty: 135 TABLET | Refills: 1 | COMMUNITY
Start: 2020-07-03 | End: 2020-07-14

## 2020-07-03 RX ORDER — GABAPENTIN 300 MG/1
CAPSULE ORAL
COMMUNITY
Start: 2020-07-03 | End: 2021-07-01

## 2020-07-03 RX ORDER — ALENDRONATE SODIUM 70 MG/1
70 TABLET ORAL
COMMUNITY
Start: 2020-06-26 | End: 2021-07-02

## 2020-07-03 RX ORDER — OXYCODONE AND ACETAMINOPHEN 5; 325 MG/1; MG/1
TABLET ORAL
COMMUNITY
Start: 2020-06-26 | End: 2021-03-01

## 2020-07-03 RX ORDER — LATANOPROST 50 UG/ML
SOLUTION/ DROPS OPHTHALMIC
COMMUNITY
Start: 2020-06-19

## 2020-07-03 ASSESSMENT — MIFFLIN-ST. JEOR: SCORE: 1195.09

## 2020-07-03 NOTE — PROGRESS NOTES
"  Subjective     Marylee Woods is a 65 year old female who presents to clinic today for the following health issues:    HPI       {SUPERLIST (Optional):704024}  {additonal problems for provider to add (Optional):561081}    {HIST REVIEW/ LINKS 2 (Optional):434200}    Reviewed and updated as needed this visit by Provider  Tobacco  Allergies  Meds  Problems  Med Hx  Surg Hx  Fam Hx         Review of Systems   {ROS COMP (Optional):357304}      Objective    /66 (BP Location: Right arm, Patient Position: Sitting, Cuff Size: Adult Regular)   Pulse 77   Temp 97.9  F (36.6  C) (Tympanic)   Resp 16   Ht 1.746 m (5' 8.75\")   Wt 59 kg (130 lb)   LMP  (LMP Unknown)   SpO2 97%   BMI 19.34 kg/m    Body mass index is 19.34 kg/m .  Physical Exam   {Exam List (Optional):694110}    {Diagnostic Test Results (Optional):487942::\"Diagnostic Test Results:\",\"Labs reviewed in Epic\"}        {PROVIDER CHARTING PREFERENCE:891476}    "
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
## 2020-07-04 LAB
ANION GAP SERPL CALCULATED.3IONS-SCNC: 6 MMOL/L (ref 3–14)
BUN SERPL-MCNC: 26 MG/DL (ref 7–30)
CALCIUM SERPL-MCNC: 10 MG/DL (ref 8.5–10.1)
CHLORIDE SERPL-SCNC: 106 MMOL/L (ref 94–109)
CO2 SERPL-SCNC: 29 MMOL/L (ref 20–32)
CREAT SERPL-MCNC: 1.17 MG/DL (ref 0.52–1.04)
GFR SERPL CREATININE-BSD FRML MDRD: 49 ML/MIN/{1.73_M2}
GLUCOSE SERPL-MCNC: 83 MG/DL (ref 70–99)
POTASSIUM SERPL-SCNC: 4.2 MMOL/L (ref 3.4–5.3)
SODIUM SERPL-SCNC: 141 MMOL/L (ref 133–144)

## 2020-07-06 PROBLEM — D61.818 PANCYTOPENIA (H): Status: ACTIVE | Noted: 2017-10-03

## 2020-07-06 NOTE — RESULT ENCOUNTER NOTE
Hi Marylee:  It was very nice to see you last week!  Your tests show:  1. Your kidneys still show decreased GFR or moderated kidney disease.  It's a little better then your last check.  I'd like you to continue to avoid ibuprofen and other NSAID medications, and stay hydrated!  2. Your blood counts all remain low, most likely from your medications and [place].  This is unchanged from the last several years, and your hemoglobin looks a bit better.  If your labs worsen your next step would be to follow up with your hematologist again, who you last saw in 2017.  Let me know if you have any questions about this.  Best,  Dr. Carolina Pulliam MD/[facility]

## 2020-07-07 LAB
COVID-19 SPIKE RBD ABY TITER: NORMAL
COVID-19 SPIKE RBD ABY: POSITIVE

## 2020-07-07 NOTE — RESULT ENCOUNTER NOTE
Hi Marylee:  You are my FIRST patient with positive COVID antibodies, and you are immunosuppressed!  What a strong immune system you must have.  Glad to see these results, and let me know if you have any questions (although most likely we won't know the answer yet, as so much is unknown about what these antibodies mean!).  Best,  Dr. Carolina Pulliam MD / [facility]

## 2020-07-10 ENCOUNTER — TRANSFERRED RECORDS (OUTPATIENT)
Dept: HEALTH INFORMATION MANAGEMENT | Facility: CLINIC | Age: 65
End: 2020-07-10

## 2020-07-12 DIAGNOSIS — F32.5 MAJOR DEPRESSION IN COMPLETE REMISSION (H): ICD-10-CM

## 2020-07-14 RX ORDER — SERTRALINE HYDROCHLORIDE 100 MG/1
TABLET, FILM COATED ORAL
Qty: 135 TABLET | Refills: 1 | Status: SHIPPED | OUTPATIENT
Start: 2020-07-14 | End: 2020-11-20

## 2020-07-27 DIAGNOSIS — I10 ESSENTIAL HYPERTENSION, BENIGN: ICD-10-CM

## 2020-07-27 RX ORDER — HYDROCHLOROTHIAZIDE 12.5 MG/1
TABLET ORAL
Qty: 90 TABLET | Refills: 0
Start: 2020-07-27

## 2020-08-13 DIAGNOSIS — I10 ESSENTIAL HYPERTENSION, BENIGN: ICD-10-CM

## 2020-08-17 RX ORDER — HYDROCHLOROTHIAZIDE 12.5 MG/1
TABLET ORAL
Qty: 90 TABLET | Refills: 1 | Status: SHIPPED | OUTPATIENT
Start: 2020-08-17 | End: 2021-02-04

## 2020-08-28 ENCOUNTER — TRANSFERRED RECORDS (OUTPATIENT)
Dept: HEALTH INFORMATION MANAGEMENT | Facility: CLINIC | Age: 65
End: 2020-08-28

## 2020-09-01 ENCOUNTER — VIRTUAL VISIT (OUTPATIENT)
Dept: FAMILY MEDICINE | Facility: CLINIC | Age: 65
End: 2020-09-01
Payer: MEDICARE

## 2020-09-01 ENCOUNTER — TELEPHONE (OUTPATIENT)
Dept: FAMILY MEDICINE | Facility: CLINIC | Age: 65
End: 2020-09-01

## 2020-09-01 DIAGNOSIS — R82.90 FOUL SMELLING URINE: Primary | ICD-10-CM

## 2020-09-01 DIAGNOSIS — R82.90 NONSPECIFIC FINDING ON EXAMINATION OF URINE: Primary | ICD-10-CM

## 2020-09-01 DIAGNOSIS — N39.0 FREQUENT UTI: ICD-10-CM

## 2020-09-01 LAB
ALBUMIN UR-MCNC: NEGATIVE MG/DL
APPEARANCE UR: ABNORMAL
BACTERIA #/AREA URNS HPF: ABNORMAL /HPF
BILIRUB UR QL STRIP: NEGATIVE
COLOR UR AUTO: YELLOW
GLUCOSE UR STRIP-MCNC: NEGATIVE MG/DL
HGB UR QL STRIP: ABNORMAL
KETONES UR STRIP-MCNC: NEGATIVE MG/DL
LEUKOCYTE ESTERASE UR QL STRIP: ABNORMAL
NITRATE UR QL: POSITIVE
NON-SQ EPI CELLS #/AREA URNS LPF: ABNORMAL /LPF
PH UR STRIP: 6 PH (ref 5–7)
RBC #/AREA URNS AUTO: ABNORMAL /HPF
RENAL EPI CELLS #/AREA URNS HPF: ABNORMAL /HPF
SOURCE: ABNORMAL
SP GR UR STRIP: 1.01 (ref 1–1.03)
UROBILINOGEN UR STRIP-ACNC: 0.2 EU/DL (ref 0.2–1)
WBC #/AREA URNS AUTO: ABNORMAL /HPF

## 2020-09-01 PROCEDURE — 87086 URINE CULTURE/COLONY COUNT: CPT | Performed by: FAMILY MEDICINE

## 2020-09-01 PROCEDURE — 81001 URINALYSIS AUTO W/SCOPE: CPT | Performed by: FAMILY MEDICINE

## 2020-09-01 PROCEDURE — 99213 OFFICE O/P EST LOW 20 MIN: CPT | Mod: 95 | Performed by: FAMILY MEDICINE

## 2020-09-01 RX ORDER — SULFAMETHOXAZOLE/TRIMETHOPRIM 800-160 MG
1 TABLET ORAL 2 TIMES DAILY
Qty: 6 TABLET | Refills: 0 | Status: SHIPPED | OUTPATIENT
Start: 2020-09-01 | End: 2020-09-04

## 2020-09-01 NOTE — TELEPHONE ENCOUNTER
Patient called back and was scheduled for a video visit with Dr Landaverde as she is not currently able to access her My Chart to do an e-visit.  Kiya Mccabe RN

## 2020-09-01 NOTE — PROGRESS NOTES
"Marylee Woods is a 65 year old female who is being evaluated via a billable video visit.      The patient has been notified of following:     \"This video visit will be conducted via a call between you and your physician/provider. We have found that certain health care needs can be provided without the need for an in-person physical exam.  This service lets us provide the care you need with a video conversation.  If a prescription is necessary we can send it directly to your pharmacy.  If lab work is needed we can place an order for that and you can then stop by our lab to have the test done at a later time.    Video visits are billed at different rates depending on your insurance coverage.  Please reach out to your insurance provider with any questions.    If during the course of the call the physician/provider feels a video visit is not appropriate, you will not be charged for this service.\"    Patient has given verbal consent for Video visit? Yes  How would you like to obtain your AVS? MyChart  If you are dropped from the video visit, the video invite should be resent to: Text to cell phone: [phone]-doximity  Will anyone else be joining your video visit? No     Subjective     Marylee Woods is a 65 year old female who presents today via video visit for the following health issues:    HPI    Genitourinary - Female  Onset/Duration: more than 1 week   Description:   Painful urination (Dysuria): no           Frequency: YES  Blood in urine (Hematuria): no  Delay in urine (Hesitency): no  Intensity: mild  Progression of Symptoms:  same  Accompanying Signs & Symptoms:  Fever/chills: no  Flank pain: no  Nausea and vomiting: no  Vaginal symptoms: odor  Abdominal/Pelvic Pain: no  History:   History of frequent UTI s: YES  History of kidney stones: no  Sexually Active: no  Possibility of pregnancy: No  Precipitating or alleviating factors: None  Therapies tried and outcome:  none      Video Start Time: 2:49 PM    Staying " "safe.     For last week - urine is smelly and having frequency. No pain or blood in urine.   No douching or vaginal products.       Review of Systems   Constitutional, HEENT, cardiovascular, pulmonary, gi and gu systems are negative, except as otherwise noted.      Objective    Vitals - Patient Reported  Weight (Patient Reported): 61.2 kg (135 lb)  Height (Patient Reported): 174.6 cm (5' 8.75\")  BMI (Based on Pt Reported Ht/Wt): 20.08        Physical Exam     GENERAL: Healthy, alert and no distress  EYES: Eyes grossly normal to inspection.  No discharge or erythema, or obvious scleral/conjunctival abnormalities.  RESP: No audible wheeze, cough, or visible cyanosis.  No visible retractions or increased work of breathing.    SKIN: Visible skin clear. No significant rash, abnormal pigmentation or lesions.  NEURO: Cranial nerves grossly intact.  Mentation and speech appropriate for age.  PSYCH: Mentation appears normal, affect normal/bright, judgement and insight intact, normal speech and appearance well-groomed.      Results for orders placed or performed in visit on 09/01/20 (from the past 24 hour(s))   UA with Microscopic reflex to Culture    Specimen: Midstream Urine   Result Value Ref Range    Color Urine Yellow     Appearance Urine Cloudy     Glucose Urine Negative NEG^Negative mg/dL    Bilirubin Urine Negative NEG^Negative    Ketones Urine Negative NEG^Negative mg/dL    Specific Gravity Urine 1.015 1.003 - 1.035    pH Urine 6.0 5.0 - 7.0 pH    Protein Albumin Urine Negative NEG^Negative mg/dL    Urobilinogen Urine 0.2 0.2 - 1.0 EU/dL    Nitrite Urine Positive (A) NEG^Negative    Blood Urine Trace (A) NEG^Negative    Leukocyte Esterase Urine Large (A) NEG^Negative    Source Midstream Urine     WBC Urine  (A) OTO5^0 - 5 /HPF    RBC Urine O - 2 OTO2^O - 2 /HPF    Squamous Epithelial /LPF Urine Few FEW^Few /LPF    Renal Tub Epi Moderate (A) NEG^Negative /HPF    Bacteria Urine Many (A) NEG^Negative /HPF       "     Assessment & Plan     Foul smelling urine  ua is abnormal but she is largely asymptomatic except for foul-smelling urine.  Asymptomatic bacteriuria cannot be ruled out completely.  Due to her complaints of foul-smelling urine, it would be appropriate to treat her with Bactrim for 3 days while we are waiting for culture.  If culture is positive, we need to extend antibiotic course for total of 7 days or switch it to a different agent if it shows resistance.  She understood.  - sulfamethoxazole-trimethoprim (BACTRIM DS) 800-160 MG tablet; Take 1 tablet by mouth 2 times daily for 3 days     We discussed red flag symptoms and when to seek urgent care.    Jenaro Landaverde MD, MD  [facility]      Video-Visit Details    Type of service:  Video Visit    Video End Time:2:56 PM    Originating Location (pt. Location): Home    Distant Location (provider location):  home    Platform used for Video Visit: jayro

## 2020-09-01 NOTE — TELEPHONE ENCOUNTER
Carolina Pulliam MD  [facility]               Looks like patient has an INFECTION - can you call her and ask her to please start E-Visit so we can prescribe medication for her?   Thanks,   Dr. Carolina Pulliam MD / [facility]      Left message on answering machine for patient to call clinic triage.  HEIDY Stock

## 2020-09-02 LAB
BACTERIA SPEC CULT: NORMAL
SPECIMEN SOURCE: NORMAL

## 2020-09-24 DIAGNOSIS — N39.0 FREQUENT UTI: ICD-10-CM

## 2020-09-24 DIAGNOSIS — R82.90 NONSPECIFIC FINDING ON EXAMINATION OF URINE: Primary | ICD-10-CM

## 2020-09-24 LAB
ALBUMIN UR-MCNC: NEGATIVE MG/DL
APPEARANCE UR: ABNORMAL
BACTERIA #/AREA URNS HPF: ABNORMAL /HPF
BILIRUB UR QL STRIP: NEGATIVE
COLOR UR AUTO: YELLOW
GLUCOSE UR STRIP-MCNC: NEGATIVE MG/DL
HGB UR QL STRIP: ABNORMAL
KETONES UR STRIP-MCNC: NEGATIVE MG/DL
LEUKOCYTE ESTERASE UR QL STRIP: ABNORMAL
NITRATE UR QL: POSITIVE
PH UR STRIP: 6 PH (ref 5–7)
RBC #/AREA URNS AUTO: ABNORMAL /HPF
SOURCE: ABNORMAL
SP GR UR STRIP: 1.02 (ref 1–1.03)
TRANS CELLS #/AREA URNS HPF: ABNORMAL /HPF
UROBILINOGEN UR STRIP-ACNC: 0.2 EU/DL (ref 0.2–1)
WBC #/AREA URNS AUTO: ABNORMAL /HPF

## 2020-09-24 PROCEDURE — 81001 URINALYSIS AUTO W/SCOPE: CPT | Performed by: FAMILY MEDICINE

## 2020-09-24 PROCEDURE — 87086 URINE CULTURE/COLONY COUNT: CPT | Performed by: FAMILY MEDICINE

## 2020-09-25 ENCOUNTER — TELEPHONE (OUTPATIENT)
Dept: FAMILY MEDICINE | Facility: CLINIC | Age: 65
End: 2020-09-25

## 2020-09-25 DIAGNOSIS — N39.0 ACUTE UTI: Primary | ICD-10-CM

## 2020-09-25 LAB
BACTERIA SPEC CULT: NORMAL
SPECIMEN SOURCE: NORMAL

## 2020-09-25 RX ORDER — NITROFURANTOIN 25; 75 MG/1; MG/1
100 CAPSULE ORAL 2 TIMES DAILY
Qty: 14 CAPSULE | Refills: 0 | Status: SHIPPED | OUTPATIENT
Start: 2020-09-25 | End: 2021-04-01

## 2020-09-25 NOTE — RESULT ENCOUNTER NOTE
Wait for culture given past normal culture with similar Urinalysis findings.Dr. Carolina Pulliam MD

## 2020-09-25 NOTE — TELEPHONE ENCOUNTER
I called patient.  Given that she has had these symptoms for 3 weeks and typically responds to antibiotics and that Urinalysis was likely contaminated - will treat.  Sent antibiotics.  Follow up if worsens or doesn't improve.    Can you let patient know she needs to do E-visit or Phone Visit for Urinary Tract Infection in future?    Dr. Carolina Pulliam MD / [facility]

## 2020-09-25 NOTE — TELEPHONE ENCOUNTER
Pt called states she is wanting her urinalysis. Pt states she is having frequency, mild dysuria, and foul smelling urine.Still awaiting culture results. Carolina Drew RN

## 2020-10-09 ENCOUNTER — TRANSFERRED RECORDS (OUTPATIENT)
Dept: HEALTH INFORMATION MANAGEMENT | Facility: CLINIC | Age: 65
End: 2020-10-09

## 2020-11-20 ENCOUNTER — VIRTUAL VISIT (OUTPATIENT)
Dept: FAMILY MEDICINE | Facility: CLINIC | Age: 65
End: 2020-11-20
Payer: MEDICARE

## 2020-11-20 DIAGNOSIS — Z86.39 PERSONAL HISTORY OF OTHER ENDOCRINE, NUTRITIONAL AND METABOLIC DISEASE: ICD-10-CM

## 2020-11-20 DIAGNOSIS — E05.90 HYPERTHYROIDISM: ICD-10-CM

## 2020-11-20 DIAGNOSIS — K21.9 GASTROESOPHAGEAL REFLUX DISEASE WITHOUT ESOPHAGITIS: ICD-10-CM

## 2020-11-20 DIAGNOSIS — Z00.00 ENCOUNTER FOR MEDICARE ANNUAL WELLNESS EXAM: Primary | ICD-10-CM

## 2020-11-20 DIAGNOSIS — R79.89 LOW TSH LEVEL: ICD-10-CM

## 2020-11-20 DIAGNOSIS — G35 MS (MULTIPLE SCLEROSIS) (H): ICD-10-CM

## 2020-11-20 DIAGNOSIS — F33.1 MODERATE EPISODE OF RECURRENT MAJOR DEPRESSIVE DISORDER (H): ICD-10-CM

## 2020-11-20 PROCEDURE — 99214 OFFICE O/P EST MOD 30 MIN: CPT | Mod: 95 | Performed by: FAMILY MEDICINE

## 2020-11-20 RX ORDER — DULOXETIN HYDROCHLORIDE 60 MG/1
60 CAPSULE, DELAYED RELEASE ORAL DAILY
Qty: 30 CAPSULE | Refills: 1 | Status: SHIPPED | OUTPATIENT
Start: 2020-11-20 | End: 2020-12-17

## 2020-11-20 RX ORDER — FAMOTIDINE 40 MG/1
40 TABLET, FILM COATED ORAL 2 TIMES DAILY
Qty: 60 TABLET | Refills: 0 | Status: SHIPPED | OUTPATIENT
Start: 2020-11-20 | End: 2020-12-17

## 2020-11-20 NOTE — PROGRESS NOTES
"Marylee Woods is a 65 year old female who is being evaluated via a billable video visit.      The patient has been notified of following:     \"This video visit will be conducted via a call between you and your physician/provider. We have found that certain health care needs can be provided without the need for an in-person physical exam.  This service lets us provide the care you need with a video conversation.  If a prescription is necessary we can send it directly to your pharmacy.  If lab work is needed we can place an order for that and you can then stop by our lab to have the test done at a later time.    Video visits are billed at different rates depending on your insurance coverage.  Please reach out to your insurance provider with any questions.    If during the course of the call the physician/provider feels a video visit is not appropriate, you will not be charged for this service.\"    Patient has given verbal consent for Video visit? Yes  How would you like to obtain your AVS? Mail a copy  If you are dropped from the video visit, the video invite should be resent to: Send to e-mail at: [email or web address]  Will anyone else be joining your video visit? No    Subjective     Marylee Woods is a 65 year old female who presents today via video visit for the following health issues:    HPI          Are you in the first 12 months of your Medicare Part B coverage?  No    Physical Health:    In general, how would you rate your overall physical health? fair    Outside of work, how many days during the week do you exercise? 2-3 days/week    Outside of work, approximately how many minutes a day do you exercise?15-30 minutes    If you drink alcohol do you typically have >3 drinks per day or >7 drinks per week? No    Do you usually eat at least 4 servings of fruit and vegetables a day, include whole grains & fiber and avoid regularly eating high fat or \"junk\" foods? NO    Do you have any problems taking medications " regularly?  No    Do you have any side effects from medications? none    Needs assistance for the following daily activities: transportation, shopping and laundry    Which of the following safety concerns are present in your home?  poor lighting     Hearing impairment: No    In the past 6 months, have you been bothered by leaking of urine? yes    Mental Health:    In general, how would you rate your overall mental or emotional health? fair  PHQ-2 Score:      Do you feel safe in your environment? Yes    Have you ever done Advance Care Planning? (For example, a Health Directive, POLST, or a discussion with a medical provider or your loved ones about your wishes): No, advance care planning information given to patient to review.  Patient plans to discuss their wishes with loved ones or provider.      Additional concerns to address?  No    Fall risk:  Fallen 2 or more times in the past year?: No  Any fall with injury in the past year?: No    Cog Screen - not done as video visit     Video Start Time: 3:02 PM    COVID- No lingering cough.  Tends to be tired just because has MS. [place] - just got tested, it's negative.  Wonders - did she have antibodies?    Also - Dr. Redman her MS doctor does blood work for her Lemtrada every month.  They only call her if looks like she has a bladder infection or if something wrong with her blood.    They told her - TSH was low on last check.  Symptoms - does get hot all the time.  No weight loss.  No heart palpitations.      Also - omeprazole.  Every time it has the big white fold out - says you shouldn't take for longer than needed.    Hasn't tried to stop taking.  Sometimes heartburn.    Finally: antidepressants.  Still taking sertraline.    Also - Wellbutrin.  Still feels down.  Also, there are times when really depressed.    PHQ 11/27/2018 4/27/2019 5/20/2020   PHQ-9 Total Score 5 10 8   Q9: Thoughts of better off dead/self-harm past 2 weeks Not at all Several days Not at all    F/U: Thoughts of suicide or self-harm - No -   F/U: Safety concerns - No -             Review of Systems   Constitutional, HEENT, cardiovascular, pulmonary, gi and gu systems are negative, except as otherwise noted.      Objective           Vitals:  No vitals were obtained today due to virtual visit.    Physical Exam     GENERAL: Healthy, alert and no distress  EYES: Eyes grossly normal to inspection.  No discharge or erythema, or obvious scleral/conjunctival abnormalities.  RESP: No audible wheeze, cough, or visible cyanosis.  No visible retractions or increased work of breathing.    SKIN: Visible skin clear. No significant rash, abnormal pigmentation or lesions.  NEURO: Cranial nerves grossly intact.  Mentation and speech appropriate for age.  PSYCH: Mentation appears normal, affect normal/bright, judgement and insight intact, normal speech and appearance well-groomed.              Assessment & Plan     Marylee was seen today for physical.    Diagnoses and all orders for this visit:    Encounter for Medicare annual wellness exam  Comments:  Done today.  Needs shingrix, flu vaccine    Low TSH level  Comments:  Per lab tests with Dr. Redman MS doctor.  Recheck with our lab lab only next week.  If TSH low - will refer end.  Orders:  -     **TSH with free T4 reflex FUTURE anytime; Future    Personal history of other endocrine, nutritional and metabolic disease   Comments:  As above low TSH level  Orders:  -     **TSH with free T4 reflex FUTURE anytime; Future    Moderate episode of recurrent major depressive disorder (H)  Comments:  Worsened recently despite 100 mg sertraline and bupropion.  Feels like never really worked.  Low energy.  Trial of switch to cymbalta 60 mg  F/u 1 month  Orders:  -     DULoxetine (CYMBALTA) 60 MG capsule; Take 1 capsule (60 mg) by mouth daily    Gastroesophageal reflux disease without esophagitis  Comments:  Normal EGD 2018.  Will trial being off omeprazole.  Use pepcid for 2-4 weeks,  "tums prn  reassess 1 month visit  Orders:  -     famotidine (PEPCID) 40 MG tablet; Take 1 tablet (40 mg) by mouth 2 times daily          Discussed risks/benefits/side effects.  Discussed with patient, all questions answered, in agreement with this plan, will return or seek further care if not improving or worsening.        Return in about 53 weeks (around 11/26/2021) for Annual Wellness Visit.    Carolina Pulliam MD  [facility]      Video-Visit Details    Type of service:  Video Visit    Video End Time:3:31 PM    Originating Location (pt. Location): Home    Distant Location (provider location):  [facility]     Platform used for Video Visit: Affibody        Patient has been advised of split billing requirements and indicates understanding: Yes    COUNSELING:  Reviewed preventive health counseling, as reflected in patient instructions    Estimated body mass index is 19.34 kg/m  as calculated from the following:    Height as of 7/3/20: 1.746 m (5' 8.75\").    Weight as of 7/3/20: 59 kg (130 lb).        She reports that she has quit smoking. Her smoking use included cigarettes. She smoked 0.25 packs per day. She quit smokeless tobacco use about 2 years ago.    Appropriate preventive services were discussed with this patient, including applicable screening as appropriate for cardiovascular disease, diabetes, osteopenia/osteoporosis, and glaucoma.  As appropriate for age/gender, discussed screening for colorectal cancer, prostate cancer, breast cancer, and cervical cancer. Checklist reviewing preventive services available has been given to the patient.    Reviewed patients plan of care and provided an AVS. The Basic Care Plan (routine screening as documented in Health Maintenance) for Marylee meets the Care Plan requirement. This Care Plan has been established and reviewed with the Patient.    Counseling Resources:  ATP IV Guidelines  Pooled Cohorts Equation " Calculator  Breast Cancer Risk Calculator  BRCA-Related Cancer Risk Assessment: FHS-7 Tool  FRAX Risk Assessment  ICSI Preventive Guidelines  Dietary Guidelines for Americans, 2010  USDA's MyPlate  ASA Prophylaxis  Lung CA Screening    Carolina Pulliam MD  [facility]

## 2020-11-20 NOTE — PATIENT INSTRUCTIONS
For your depression:  1. Let's try a switch of medication.  2. Stop sertraline and bupropion.  3. Start Cymbalta 60 mg once a day in AM. This medicine works for depression and for chronic pain.  4. If you have bad side effects, let us know right away.  5. Otherwise follow up 1 month.     For your low TSH:  1. We'll recheck this.  2. If remains low, I may need to refer you to an endocrinologist.    For your omeprazole/reflux:  1. Let's try to get you off of this.  2. Stop omeprazole and take pepcid 40 mg twice a day for 2-4 weeks.  3. Follow up at visit in 1 month.          Patient Education   Personalized Prevention Plan  You are due for the preventive services outlined below.  Your care team is available to assist you in scheduling these services.  If you have already completed any of these items, please share that information with your care team to update in your medical record.  Health Maintenance Due   Topic Date Due     Osteoporosis Screening  1955     Zoster (Shingles) Vaccine (1 of 2) 05/14/2005     Lung Cancer Screening (CT Scan)  06/06/2020     Flu Vaccine (1) 09/01/2020     Depression Assessment  10/26/2020

## 2020-12-03 ENCOUNTER — TRANSFERRED RECORDS (OUTPATIENT)
Dept: HEALTH INFORMATION MANAGEMENT | Facility: CLINIC | Age: 65
End: 2020-12-03

## 2020-12-10 DIAGNOSIS — R79.89 LOW TSH LEVEL: ICD-10-CM

## 2020-12-10 DIAGNOSIS — Z86.39 PERSONAL HISTORY OF OTHER ENDOCRINE, NUTRITIONAL AND METABOLIC DISEASE: ICD-10-CM

## 2020-12-10 PROCEDURE — 84439 ASSAY OF FREE THYROXINE: CPT | Performed by: FAMILY MEDICINE

## 2020-12-10 PROCEDURE — 84443 ASSAY THYROID STIM HORMONE: CPT | Performed by: FAMILY MEDICINE

## 2020-12-10 PROCEDURE — 36415 COLL VENOUS BLD VENIPUNCTURE: CPT | Performed by: FAMILY MEDICINE

## 2020-12-11 ENCOUNTER — TELEPHONE (OUTPATIENT)
Dept: FAMILY MEDICINE | Facility: CLINIC | Age: 65
End: 2020-12-11

## 2020-12-11 LAB
T4 FREE SERPL-MCNC: 3.3 NG/DL (ref 0.76–1.46)
TSH SERPL DL<=0.005 MIU/L-ACNC: <0.01 MU/L (ref 0.4–4)

## 2020-12-11 NOTE — TELEPHONE ENCOUNTER
----- Message from Carolina Pulliam MD sent at 12/11/2020 10:28 AM CST -----  Patient will need to see endo - can you call and let her know?  Test results confirm hyperthyroidism that her neurologist noted on his labs - I think this may be a side effect of her MS medications.  I've discussed this with her - so she won't be surprised completely by this news but she doesn't check MyChart consistently and regularly so it would be wonderful if you could call her.   Thank you!  Dr. Carolina Pulliam MD / [facility]

## 2020-12-11 NOTE — RESULT ENCOUNTER NOTE
Patient will need to see endo - can you call and let her know?  Test results confirm hyperthyroidism that her neurologist noted on his labs - I think this may be a side effect of her MS medications.  I've discussed this with her - so she won't be surprised completely by this news but she doesn't check MyChart consistently and regularly so it would be wonderful if you could call her.   Thank you!  Dr. Carolina Pulliam MD / [facility]

## 2020-12-14 NOTE — TELEPHONE ENCOUNTER
Your provider has referred you to: FM:  [facility]  [phone]   Dr LOVE Pulliam's comments relayed to pt and she took the number down to schedule  She has asked thyroid test be sent to her neurologist Dr Palacios fax [phone]    Lab results faxed to Dr Suzanne Bowman, RN, BSN

## 2020-12-15 DIAGNOSIS — F33.1 MODERATE EPISODE OF RECURRENT MAJOR DEPRESSIVE DISORDER (H): ICD-10-CM

## 2020-12-15 DIAGNOSIS — K21.9 GASTROESOPHAGEAL REFLUX DISEASE WITHOUT ESOPHAGITIS: ICD-10-CM

## 2020-12-17 RX ORDER — FAMOTIDINE 40 MG/1
TABLET, FILM COATED ORAL
Qty: 180 TABLET | Refills: 2 | Status: SHIPPED | OUTPATIENT
Start: 2020-12-17 | End: 2021-09-07

## 2020-12-17 RX ORDER — DULOXETIN HYDROCHLORIDE 60 MG/1
CAPSULE, DELAYED RELEASE ORAL
Qty: 90 CAPSULE | Refills: 1 | Status: SHIPPED | OUTPATIENT
Start: 2020-12-17 | End: 2021-06-10

## 2020-12-18 ENCOUNTER — TELEPHONE (OUTPATIENT)
Dept: FAMILY MEDICINE | Facility: CLINIC | Age: 65
End: 2020-12-18

## 2020-12-18 NOTE — TELEPHONE ENCOUNTER
Pt just made an upcoming apt with [facility] in [place]. The appis not until 02/03/2021.  She is requesting some further instruction from  on- if her TSH is elevated then what to do until this appointment?    Thanks!     Jazzy Hernandez RN

## 2020-12-18 NOTE — TELEPHONE ENCOUNTER
Reason for Call:  Pt just made an upcoming apt with [facility] in [place]. The apt is not until 02/03/2021.    Detailed comments: Pt is requesting some further instruction from  on- if her TSH is elevated then what to do until this apt.    Phone Number Patient can be reached at: [phone]    Best Time: Any    Can we leave a detailed message on this number? Yes    Call taken on 12/18/2020 at 11:01 AM by Elodia Toussaint

## 2020-12-21 ENCOUNTER — E-CONSULT (OUTPATIENT)
Dept: ENDOCRINOLOGY | Facility: CLINIC | Age: 65
End: 2020-12-21

## 2020-12-21 ENCOUNTER — TELEPHONE (OUTPATIENT)
Dept: ENDOCRINOLOGY | Facility: CLINIC | Age: 65
End: 2020-12-21

## 2020-12-21 NOTE — TELEPHONE ENCOUNTER
Patient informed of message per Dr. Pulliam.    Patient verbalized understanding and in agreement with plan.    Awaiting provider response on plan.    SAEID LowN, RN

## 2020-12-21 NOTE — TELEPHONE ENCOUNTER
Can you let her know I'll put in an E-consult?  I'll hear back from endocrinology within 24 hours and get back to her.    I'll keep this encounter open to relay response from endo.    Thanks!    Dr. Carolina Pulliam MD / [facility]

## 2020-12-22 NOTE — PROGRESS NOTES
12/21/2020     E-Consult has been denied due to: Doesn't meet criteria for E-Consult.    Interprofessional consultation requested by: Carolina Pulliam MD    Clinical Question/Purpose: Newly diagnosed Hyperthyroidism - discovered by MS doctors in routine screening of Lemtrada that she is on.  Confirmed by recent lab tests with me.  Can't get in to see endo until February.  Can this wait?  Next steps?       Patient assessment and information reviewed:   12/10/2020 TSH < 0.01, free T4 3.3    Recommendations: We have mechanisms for gettting in urgent consults.  The call center should have asked for this per our process.  This is not appropriate e-consult.  We will get her in within a week.    The recommendations provided in this E-Consult are based on the clinical data available to me at this time, and are furnished without the benefit of a comprehensive in-person or virtual patient evaluation, Any new clinical issues or changes in patient status since the filing of this E-Consult will need to be taken into account when assessing these recommendations. Please contact me if you have further questions.    Report sent automatically to requesting provider once signed.     Charge code: [id number] (5+ minutes)     Juanita Og MD

## 2020-12-22 NOTE — TELEPHONE ENCOUNTER
To schedulers : please schedule with consult service (or open new/SKYLER) within the week. This could be a virtual visit.      Juanita Og MD  Endocrine triage

## 2020-12-22 NOTE — TELEPHONE ENCOUNTER
LVM for pt to c/b to schedule with consult service next week. Call Barb/Katelin for scheduling options.

## 2020-12-23 ENCOUNTER — TELEPHONE (OUTPATIENT)
Dept: ENDOCRINOLOGY | Facility: CLINIC | Age: 65
End: 2020-12-23

## 2020-12-23 NOTE — TELEPHONE ENCOUNTER
LVM on previous encounter. Pt can be scheduled in Dr. Bean's consult clinic next week for VIRTUAL VISIT NEW, or any other 60 minute SKYLER/PAN slot within the week.

## 2020-12-23 NOTE — TELEPHONE ENCOUNTER
2nd request.  Urgent.   To schedulers : please schedule with consult service (or open new/SKYLER) within the week. This could be a virtual visit.  She really needs to be seen.      Juanita Og MD  Endocrine triage

## 2020-12-28 NOTE — TELEPHONE ENCOUNTER
RECORDS RECEIVED FROM: internal    DATE RECEIVED: 12.29.20    NOTES (FOR ALL VISITS) STATUS DETAILS   OFFICE NOTES from referring provider Carolina Garibay   OFFICE NOTES from other specialist na    ED NOTES na    OPERATIVE REPORT  (thyroid, pituitary, adrenal, parathyroid) na    MEDICATION LIST Internal     IMAGING      DEXASCAN na    MRI (BRAIN) na    XR (Chest) Internal  5.4.19, 4.27.19,    CT (HEAD/NECK/CHEST/ABDOMEN) Internal  6.6.19   NUCLEAR  na    ULTRASOUND (HEAD/NECK) na    LABS     DIABETES: HBGA1C, CREATININE, FASTING LIPIDS, MICROALBUMIN URINE, POTASSIUM, TSH, T4    THYROID: TSH, T4, CBC, THYRODLONULIN, TOTAL T3, FREE T4, CALCITONIN, CEA Internal  [facility]- 7/3/20  CREATININE 6.29.20  [facility] 12/5/20 -- received   LIPIDS 8/4/17  POTASSIUM 11/8/17  TSH/T4- 12.10.20

## 2020-12-28 NOTE — TELEPHONE ENCOUNTER
Pt received message and writer unable to locate any openings (virtual new/valeri) with Dr Bean for this week. Please call pt back. Writer searched for other providers but nothing available for this week.

## 2020-12-28 NOTE — PROGRESS NOTES
"Outcome for 12/28/20 5:29 PM :Left Voicemail for patient to call back-so  Outcome for 12/29/20 8:06 AM :Left Voicemail for patient to call back    Marylee Woods is a 65 year old female who is being evaluated via a billable video visit.      The patient has been notified of following:     \"This video visit will be conducted via a call between you and your physician/provider. We have found that certain health care needs can be provided without the need for an in-person physical exam.  This service lets us provide the care you need with a video conversation.  If a prescription is necessary we can send it directly to your pharmacy.  If lab work is needed we can place an order for that and you can then stop by our lab to have the test done at a later time.    Video visits are billed at different rates depending on your insurance coverage.  Please reach out to your insurance provider with any questions.    If during the course of the call the physician/provider feels a video visit is not appropriate, you will not be charged for this service.\"    Patient has given verbal consent for Video visit? Yes  How would you like to obtain your AVS? [facility] would like avs faxed to :[phone] dr Palacios  If you are dropped from the video visit, the video invite should be resent to: Send to e-mail at: [email or web address]  Will anyone else be joining your video visit? No    Video-Visit Details    Type of service:  Video Visit    Video Start Time: 9:40 AM  Video End Time: 10:42 AM    Originating Location (pt. Location): Home    Distant Location (provider location):  [facility] [place]     Platform used for Video Visit: Dimitris Almendarez MD    Endocrinology Medical Student/ Resident / Fellow note    Chief complaint:  Marylee is a 65 year old female seen in consultation at the request of Dr. Pulliam      HISTORY OF PRESENT ILLNESS    65 year old female with Multiple Sclerosis, being referred " "for low TSH and high free T4    Relevant thyroid studies on 12/10/2020 showed low TSH < 0.01 and elevated FT4 3.3    She was on Lemtrada for 2 years in 2017 and 2018/2019  She sees Dr. Redman, at [facility].  The patient has had a thyroid hormone levels checked quite frequently, through her neurology clinic. According to the available records at the time of the visit, her TSH was normal in 2018, 2019 and June 2020.  On 6/26 9/20, TSH level was 2.  As per patient, about 3 months ago, TSH was checked by her neurologist and it was low.  She reports no palpitations or tremors. At times, she feels anxious, but mostly related to preparation [employer]rij[employer] & work but nothing out of ordinary.  For the last 4 months or so, she does endorse some heat intolerance.  She mainly reports feeling warm while undergoing physical therapy. She also reports occasional soft stools but not loose or watery.  In general, she has a bowel movement every 2 or 3 days.  Her weight has been stable. She has chronic dry eyes but she denies double vision.  For the last few weeks, she has noticed a lack of appetite.  However, she does admit that she eats \"a lot of sugar\".  Occasionally, for many years, solid food gets stuck in her throat.  On questioning, she denies voice hoarseness or cough.    About a week ago on Monday 12/21/2020, she missed her PT session. She was sneezing a lot and had runny nose but no neck swelling or pain.    Of note, she had low TSH of 0.13 and 0.18 in 11/2011. It improved spontaneously to 1.39 in 4/2013. Prior to the Lemtrada, she was on beta interferon since 1993 for almost 20 years.  Denies long-term treatment with steroids.  Last time she remembers being treated with a short 6-day course of steroids was in 2017.    In 2018, she was incidentally diagnosed with thyroid nodules during a CAT scan.  The thyroid ultrasound from 9/7/2017 revealed multiple thyroid nodules.  The left inferior #4 thyroid nodule, " measuring 2.2 cm, was benign on biopsy.  We reviewed the ultrasound images.  Overall, the nodules had a benign ultrasound appearance, mostly spongiform.  The biopsied nodule had an internal macrocalcification, minimal internal vascularity, but no definite features suggestive of malignancy.  The patient has no prior history of radiation exposure or a family history of thyroid disease.    Pertinent labs reviewed:  7/3/2020 calcium 10  2011 cortisol 23.2  GFR in the 40s    12/3/20:  WBC 3.6   RBC 3.26  Hb 9.8  H 29.6%  Platelets 163     9/11/2018  Vitamin D 83.4  Calcium 9.2    REVIEW OF SYSTEMS    10 system ROS otherwise as per the HPI or negative    Past Medical History  Past Medical History:   Diagnosis Date     Gastro-oesophageal reflux disease      Multiple sclerosis (H)    May 2020, diagnosed with Covid  Depression  Pancytopenia dating back to at least 2014  Hyperlipidemia  Lung nodules  Candidate esophagitis  UTI  Left tibial and fibular fracture in 2013  Left tibial plateau fracture June 2020   Left femoral fracture in 10/2018, after falling from standing height  Fosamax was recommended in 6/2020 but the patient denies taking it.     Medications  Current Outpatient Medications   Medication     acetaminophen (TYLENOL) 500 MG tablet     alendronate (FOSAMAX) 70 MG tablet     baclofen (LIORESAL) 10 MG tablet     cholecalciferol (VITAMIN D3) 5000 units CAPS capsule     DULoxetine (CYMBALTA) 60 MG capsule     famotidine (PEPCID) 40 MG tablet     ferrous sulfate (IRON) 325 (65 Fe) MG tablet     gabapentin (NEURONTIN) 300 MG capsule     hydrochlorothiazide (HYDRODIURIL) 12.5 MG tablet     multivitamin, therapeutic with minerals (THERA-VIT-M) TABS     valACYclovir (VALTREX) 500 MG tablet     latanoprost (XALATAN) 0.005 % ophthalmic solution     NICOTROL 10 MG inhaler     omeprazole (PRILOSEC) 40 MG DR capsule     oxybutynin (DITROPAN-XL) 10 MG 24 hr tablet     oxyCODONE-acetaminophen (PERCOCET) 5-325 MG tablet     No  current facility-administered medications for this visit.        Current Outpatient Medications   Medication Sig Dispense Refill     acetaminophen (TYLENOL) 500 MG tablet Take 1-2 tablets (500-1,000 mg) by mouth every 8 hours as needed for mild pain or fever (greater than 101 degrees)       alendronate (FOSAMAX) 70 MG tablet        baclofen (LIORESAL) 10 MG tablet TAKE 2TABLET BY MOUTH in AM AND 3 TABLETS BY MOUTH AT BEDTIME FOR 30 DAYS  11     cholecalciferol (VITAMIN D3) 5000 units CAPS capsule Take 7,000 Units by mouth daily        DULoxetine (CYMBALTA) 60 MG capsule TAKE 1 CAPSULE BY MOUTH EVERY DAY 90 capsule 1     famotidine (PEPCID) 40 MG tablet TAKE 1 TABLET BY MOUTH 2 TIMES DAILY 180 tablet 2     ferrous sulfate (IRON) 325 (65 Fe) MG tablet Take 1 tablet (325 mg) by mouth daily 100 tablet      gabapentin (NEURONTIN) 300 MG capsule Take 2 capsules (600 mg) by mouth daily before breakfast AND 3 capsules (900 mg) At Bedtime.       hydrochlorothiazide (HYDRODIURIL) 12.5 MG tablet TAKE 1 TABLET BY MOUTH EVERY DAY 90 tablet 1     multivitamin, therapeutic with minerals (THERA-VIT-M) TABS Take 1 tablet by mouth daily.       valACYclovir (VALTREX) 500 MG tablet Take 1 tablet (500 mg) by mouth 2 times daily 12 tablet 5     latanoprost (XALATAN) 0.005 % ophthalmic solution INSTILL 1 DROP INTO BOTH EYES EVERY DAY AS DIRECTED PT WILL NEED TO BE SEEN FOR FUTURE REFILLS       NICOTROL 10 MG inhaler Inhale 1 Cartridge into the lungs daily as needed for smoking cessation   1     omeprazole (PRILOSEC) 40 MG DR capsule TAKE 1 CAPSULE BY MOUTH EVERY DAY (Patient not taking: Reported on 12/29/2020) 90 capsule 2     oxybutynin (DITROPAN-XL) 10 MG 24 hr tablet Take 1 tablet (10 mg) by mouth 2 times daily 30 tablet      oxyCODONE-acetaminophen (PERCOCET) 5-325 MG tablet          Allergies  Allergies   Allergen Reactions     Amantadine      hallucinations     Budesonide      Symbicort Rash         Family History  family history  includes Cancer in her maternal grandfather; Heart Disease in her father, maternal grandmother, mother, and paternal grandfather.  She has positive family of autoimmune disease. Her mom has lupus and hypopituitarism. No family history of thyroid disease.  Maternal grandfather had throat cancer.    Social History  Social History     Tobacco Use     Smoking status: Former Smoker     Packs/day: 0.25     Types: Cigarettes     Smokeless tobacco: Former User     Quit date: 10/31/2018   Substance Use Topics     Alcohol use: Yes     Alcohol/week: 0.0 standard drinks     Comment: 1/wk     Drug use: No   She smokes infrequently.  mentions that she smokes 5 cigarettes per week.       Physical Exam  LMP  (LMP Unknown)   There is no height or weight on file to calculate BMI.  GENERAL :  In no apparent distress. Comfortable answering questions appropriately.  SKIN: Normal color, normal temperature, texture.    EYES: EOMI, No scleral icterus,  No proptosis, conjunctival redness, stare, retraction  NECK: No visible masses. THYROID:  No goiter  RESP:No wheezing or abnormal breath sound   NEURO: awake, alert, responds appropriately to questions.   Moves all extremities  Psychological: mentation appears normal, judgement and insight intact, normal speech  Physical exam is limited due to Phone visit related to COVID-19     DATA REVIEW     Ref. Range 12/10/2020 14:35   T4 Free Latest Ref Range: 0.76 - 1.46 ng/dL 3.30 (H)   TSH Latest Ref Range: 0.40 - 4.00 mU/L <0.01 (L)         ASSESSMENT/PLAN:     #Thyrotoxicosis, initially diagnosed 3 months ago, with some worsening on recent labs  Clinically, with exception of some heat intolerance noted over the last couple of months, the patient does not endorse definite signs or symptoms suggestive of hyperthyroidism.  Of note that some of the symptoms might be masked by multiple sclerosis.  Differential diagnosis: Alemtuzumab induced thyroid dyfunction, Grave's disease.  Less likely  subacute thyroiditis, Hashitoxicosis, given the progressive decline of TSH over the last 3 months.  Given the fairly recent onset and the degree of thyrotoxicosis, toxic multinodular goiter would be unlikely. She has positive family of autoimmune disease and MS which increase her risk for Grave's disease.  Of note that she does not have obvious evidence of Graves' ophthalmopathy.    Plan  Check TSI   If Thyroid US shows suspicious nodules, will proceed with thyroid uptake and scan to ensure absence of cold nodules  If TSI is positive, Grave's disease is confirmed, next step will be thyroid uptake and scan and LEE ablation   If TSI is negative, we will proceed with Thyroid uptake and scan, to confirm the diagnosis  We are reluctant to consider treatment with methimazole in the context of longstanding history of pancytopenia  We discussed about side effect of LEE therapy and also about precaution following LEE therapy   We will obtain her thyroid studies from the [facility]   Instructed the patient to check her heart rate at rest and contact us if her heart rate is above 90 (she has a blood pressure cuff at home).    #2  Multiple thyroid nodules, incidentally discovered during imaging tests  Overall, they had benign ultrasound features on the thyroid ultrasound from 2017.  Left #4 thyroid nodule was benign on biopsy.  The patient does not endorse local compressive neck symptoms.  Overall, very low risk of malignancy but would prefer to have the nodules reevaluated with both an ultrasound and a thyroid uptake and scan, before considering definitive radioiodine ablation.    #Osteoporosis  The diagnosis is based on the history of left femoral fracture in 2018, after falling from a standing height.  Of note the patient does not have a history of long-term treatment with steroids for MS.  Risk factors for osteoporosis identified: Postmenopausal status, MS/sedentary lifestyle, CKD, secondary  hyperparathyroidism, treatment with PPI, recent thyrotoxicosis.  The patient denies taking alendronate, which was recommended in 6/2020  Due to time restraints, we are going to readdress the osteoporosis at her follow-up visit.  In preparation for this, we are going to try to obtain her prior DEXA scan studies from the [facility].     Plans discussed with Attending, Dr. Vikas Almendarez  PGY 4   Fellow  Division of Endocrinology    I, Neli Bean, was present with the fellow who participated in the service and in the documentation of the note.  I have verified the history and personally performed the physical exam and medical decision making.  I agree with the assessment and plan of care as documented in the note.     Addendum:  I have counseled the patient on the signs and symptoms of hyperthyroidism, the interpretation of the thyroid hormone levels, the etiology of Graves' disease.  Discussed with the patient that although the risk of developing leukopenia, anemia or thrombocytopenia from methimazole is very low, considering that she already has low blood counts, I would be reluctant to consider this medication.  Also counseled on potential long-term consequences of untreated hyperthyroidism: Cardiac disease, worsening osteoporosis.  In general, surgery is not recommended unless the patient has an additional indication for surgery, besides Graves'.  131I therapy is likely to result in permanent hypothyroidism requiring daily medication for life.  Radioiodine therapy may be associated with an increased risk of the development or worsening of Graves' ophthalmopathy.  The patient has no evidence of Graves' ophthalmopathy but I did encourage her to try to quit smoking in preparation for potential radioiodine treatment.  I have counseled her on safety radiation precautions, stressing safety precautions relevant to her.     Contact radiation precautions instructions:  Sleep alone.   Kissing and sexual intercourse should be avoided  Avoid prolonged close contact with young children and pregnant women.  Wash your hands with soap and water after each visit to the bathroom  Flush the toilet 2 times after each use  Rinse the bathroom sink and tub after use  Drink plenty of liquids to assist in the removal of radioactive material that are circulating in the blood stream  Use separate eating utensils and wash them separately  Use separate towels and wash cloths and wash them separately to avoid cross contamination.  Avoid public transportation    Neli Bean MD    More than half of the visit time was spent counseling and coordinating care.

## 2020-12-29 ENCOUNTER — VIRTUAL VISIT (OUTPATIENT)
Dept: ENDOCRINOLOGY | Facility: CLINIC | Age: 65
End: 2020-12-29
Payer: MEDICARE

## 2020-12-29 ENCOUNTER — PRE VISIT (OUTPATIENT)
Dept: ENDOCRINOLOGY | Facility: CLINIC | Age: 65
End: 2020-12-29

## 2020-12-29 DIAGNOSIS — M81.0 OSTEOPOROSIS OF MULTIPLE SITES: ICD-10-CM

## 2020-12-29 DIAGNOSIS — E05.90 HYPERTHYROIDISM: ICD-10-CM

## 2020-12-29 DIAGNOSIS — E05.90 HYPERTHYROIDISM: Primary | ICD-10-CM

## 2020-12-29 DIAGNOSIS — E04.2 MULTIPLE THYROID NODULES: ICD-10-CM

## 2020-12-29 LAB — T3 SERPL-MCNC: 327 NG/DL (ref 60–181)

## 2020-12-29 PROCEDURE — 84439 ASSAY OF FREE THYROXINE: CPT | Performed by: INTERNAL MEDICINE

## 2020-12-29 PROCEDURE — 86376 MICROSOMAL ANTIBODY EACH: CPT | Performed by: INTERNAL MEDICINE

## 2020-12-29 PROCEDURE — 84480 ASSAY TRIIODOTHYRONINE (T3): CPT | Performed by: INTERNAL MEDICINE

## 2020-12-29 PROCEDURE — 99205 OFFICE O/P NEW HI 60 MIN: CPT | Mod: 95 | Performed by: INTERNAL MEDICINE

## 2020-12-29 PROCEDURE — 84445 ASSAY OF TSI GLOBULIN: CPT | Mod: 90 | Performed by: INTERNAL MEDICINE

## 2020-12-29 PROCEDURE — 86800 THYROGLOBULIN ANTIBODY: CPT | Performed by: INTERNAL MEDICINE

## 2020-12-29 PROCEDURE — 36415 COLL VENOUS BLD VENIPUNCTURE: CPT | Performed by: INTERNAL MEDICINE

## 2020-12-29 PROCEDURE — 84443 ASSAY THYROID STIM HORMONE: CPT | Performed by: INTERNAL MEDICINE

## 2020-12-29 PROCEDURE — 99000 SPECIMEN HANDLING OFFICE-LAB: CPT | Performed by: INTERNAL MEDICINE

## 2020-12-29 NOTE — LETTER
"12/29/2020       RE: Marylee Woods  [address]  [facility] [address]     Dear Colleague,    Thank you for referring your patient, Marylee Woods, to the [facility] [place] at [facility]. Please see a copy of my visit note below.    Outcome for 12/28/20 5:29 PM :Left Voicemail for patient to call back-soj  Outcome for 12/29/20 8:06 AM :Left Voicemail for patient to call back    Marylee Woods is a 65 year old female who is being evaluated via a billable video visit.      The patient has been notified of following:     \"This video visit will be conducted via a call between you and your physician/provider. We have found that certain health care needs can be provided without the need for an in-person physical exam.  This service lets us provide the care you need with a video conversation.  If a prescription is necessary we can send it directly to your pharmacy.  If lab work is needed we can place an order for that and you can then stop by our lab to have the test done at a later time.    Video visits are billed at different rates depending on your insurance coverage.  Please reach out to your insurance provider with any questions.    If during the course of the call the physician/provider feels a video visit is not appropriate, you will not be charged for this service.\"    Patient has given verbal consent for Video visit? Yes  How would you like to obtain your AVS? Kathi would like avs faxed to :[phone] dr Palacios  If you are dropped from the video visit, the video invite should be resent to: Send to e-mail at: [email or web address]  Will anyone else be joining your video visit? No    Video-Visit Details    Type of service:  Video Visit    Video Start Time: 9:40 AM  Video End Time: 10:42 AM    Originating Location (pt. Location): Home    Distant Location (provider location):  [facility] [place] " "    Platform used for Video Visit: Dimitris Almendarez MD    Endocrinology Medical Student/ Resident / Fellow note    Chief complaint:  Marylee is a 65 year old female seen in consultation at the request of Dr. Pulliam      HISTORY OF PRESENT ILLNESS    65 year old female with Multiple Sclerosis, being referred for low TSH and high free T4    Relevant thyroid studies on 12/10/2020 showed low TSH < 0.01 and elevated FT4 3.3    She was on Lemtrada for 2 years in 2017 and 2018/2019  She sees Dr. Redman, at [facility].  The patient has had a thyroid hormone levels checked quite frequently, through her neurology clinic. According to the available records at the time of the visit, her TSH was normal in 2018, 2019 and June 2020.  On 6/26 9/20, TSH level was 2.  As per patient, about 3 months ago, TSH was checked by her neurologist and it was low.  She reports no palpitations or tremors. At times, she feels anxious, but mostly related to preparation [employer] & work but nothing out of ordinary.  For the last 4 months or so, she does endorse some heat intolerance.  She mainly reports feeling warm while undergoing physical therapy. She also reports occasional soft stools but not loose or watery.  In general, she has a bowel movement every 2 or 3 days.  Her weight has been stable. She has chronic dry eyes but she denies double vision.  For the last few weeks, she has noticed a lack of appetite.  However, she does admit that she eats \"a lot of sugar\".  Occasionally, for many years, solid food gets stuck in her throat.  On questioning, she denies voice hoarseness or cough.    About a week ago on Monday 12/21/2020, she missed her PT session. She was sneezing a lot and had runny nose but no neck swelling or pain.    Of note, she had low TSH of 0.13 and 0.18 in 11/2011. It improved spontaneously to 1.39 in 4/2013. Prior to the Lemtrada, she was on beta interferon since 1993 for almost 20 years.  Denies " long-term treatment with steroids.  Last time she remembers being treated with a short 6-day course of steroids was in 2017.    In 2018, she was incidentally diagnosed with thyroid nodules during a CAT scan.  The thyroid ultrasound from 9/7/2017 revealed multiple thyroid nodules.  The left inferior #4 thyroid nodule, measuring 2.2 cm, was benign on biopsy.  We reviewed the ultrasound images.  Overall, the nodules had a benign ultrasound appearance, mostly spongiform.  The biopsied nodule had an internal macrocalcification, minimal internal vascularity, but no definite features suggestive of malignancy.  The patient has no prior history of radiation exposure or a family history of thyroid disease.    Pertinent labs reviewed:  7/3/2020 calcium 10  2011 cortisol 23.2  GFR in the 40s    12/3/20:  WBC 3.6   RBC 3.26  Hb 9.8  H 29.6%  Platelets 163     9/11/2018  Vitamin D 83.4  Calcium 9.2    REVIEW OF SYSTEMS    10 system ROS otherwise as per the HPI or negative    Past Medical History  Past Medical History:   Diagnosis Date     Gastro-oesophageal reflux disease      Multiple sclerosis (H)    May 2020, diagnosed with Covid  Depression  Pancytopenia dating back to at least 2014  Hyperlipidemia  Lung nodules  Candidate esophagitis  UTI  Left tibial and fibular fracture in 2013  Left tibial plateau fracture June 2020   Left femoral fracture in 10/2018, after falling from standing height  Fosamax was recommended in 6/2020 but the patient denies taking it.     Medications  Current Outpatient Medications   Medication     acetaminophen (TYLENOL) 500 MG tablet     alendronate (FOSAMAX) 70 MG tablet     baclofen (LIORESAL) 10 MG tablet     cholecalciferol (VITAMIN D3) 5000 units CAPS capsule     DULoxetine (CYMBALTA) 60 MG capsule     famotidine (PEPCID) 40 MG tablet     ferrous sulfate (IRON) 325 (65 Fe) MG tablet     gabapentin (NEURONTIN) 300 MG capsule     hydrochlorothiazide (HYDRODIURIL) 12.5 MG tablet     multivitamin,  therapeutic with minerals (THERA-VIT-M) TABS     valACYclovir (VALTREX) 500 MG tablet     latanoprost (XALATAN) 0.005 % ophthalmic solution     NICOTROL 10 MG inhaler     omeprazole (PRILOSEC) 40 MG DR capsule     oxybutynin (DITROPAN-XL) 10 MG 24 hr tablet     oxyCODONE-acetaminophen (PERCOCET) 5-325 MG tablet     No current facility-administered medications for this visit.        Current Outpatient Medications   Medication Sig Dispense Refill     acetaminophen (TYLENOL) 500 MG tablet Take 1-2 tablets (500-1,000 mg) by mouth every 8 hours as needed for mild pain or fever (greater than 101 degrees)       alendronate (FOSAMAX) 70 MG tablet        baclofen (LIORESAL) 10 MG tablet TAKE 2TABLET BY MOUTH in AM AND 3 TABLETS BY MOUTH AT BEDTIME FOR 30 DAYS  11     cholecalciferol (VITAMIN D3) 5000 units CAPS capsule Take 7,000 Units by mouth daily        DULoxetine (CYMBALTA) 60 MG capsule TAKE 1 CAPSULE BY MOUTH EVERY DAY 90 capsule 1     famotidine (PEPCID) 40 MG tablet TAKE 1 TABLET BY MOUTH 2 TIMES DAILY 180 tablet 2     ferrous sulfate (IRON) 325 (65 Fe) MG tablet Take 1 tablet (325 mg) by mouth daily 100 tablet      gabapentin (NEURONTIN) 300 MG capsule Take 2 capsules (600 mg) by mouth daily before breakfast AND 3 capsules (900 mg) At Bedtime.       hydrochlorothiazide (HYDRODIURIL) 12.5 MG tablet TAKE 1 TABLET BY MOUTH EVERY DAY 90 tablet 1     multivitamin, therapeutic with minerals (THERA-VIT-M) TABS Take 1 tablet by mouth daily.       valACYclovir (VALTREX) 500 MG tablet Take 1 tablet (500 mg) by mouth 2 times daily 12 tablet 5     latanoprost (XALATAN) 0.005 % ophthalmic solution INSTILL 1 DROP INTO BOTH EYES EVERY DAY AS DIRECTED PT WILL NEED TO BE SEEN FOR FUTURE REFILLS       NICOTROL 10 MG inhaler Inhale 1 Cartridge into the lungs daily as needed for smoking cessation   1     omeprazole (PRILOSEC) 40 MG DR capsule TAKE 1 CAPSULE BY MOUTH EVERY DAY (Patient not taking: Reported on 12/29/2020) 90 capsule 2      oxybutynin (DITROPAN-XL) 10 MG 24 hr tablet Take 1 tablet (10 mg) by mouth 2 times daily 30 tablet      oxyCODONE-acetaminophen (PERCOCET) 5-325 MG tablet          Allergies  Allergies   Allergen Reactions     Amantadine      hallucinations     Budesonide      Symbicort Rash         Family History  family history includes Cancer in her maternal grandfather; Heart Disease in her father, maternal grandmother, mother, and paternal grandfather.  She has positive family of autoimmune disease. Her mom has lupus and hypopituitarism. No family history of thyroid disease.  Maternal grandfather had throat cancer.    Social History  Social History     Tobacco Use     Smoking status: Former Smoker     Packs/day: 0.25     Types: Cigarettes     Smokeless tobacco: Former User     Quit date: 10/31/2018   Substance Use Topics     Alcohol use: Yes     Alcohol/week: 0.0 standard drinks     Comment: 1/wk     Drug use: No   She smokes infrequently.  mentions that she smokes 5 cigarettes per week.       Physical Exam  LMP  (LMP Unknown)   There is no height or weight on file to calculate BMI.  GENERAL :  In no apparent distress. Comfortable answering questions appropriately.  SKIN: Normal color, normal temperature, texture.    EYES: EOMI, No scleral icterus,  No proptosis, conjunctival redness, stare, retraction  NECK: No visible masses. THYROID:  No goiter  RESP:No wheezing or abnormal breath sound   NEURO: awake, alert, responds appropriately to questions.   Moves all extremities  Psychological: mentation appears normal, judgement and insight intact, normal speech  Physical exam is limited due to Phone visit related to COVID-19     DATA REVIEW     Ref. Range 12/10/2020 14:35   T4 Free Latest Ref Range: 0.76 - 1.46 ng/dL 3.30 (H)   TSH Latest Ref Range: 0.40 - 4.00 mU/L <0.01 (L)         ASSESSMENT/PLAN:     #Thyrotoxicosis, initially diagnosed 3 months ago, with some worsening on recent labs  Clinically, with exception of  some heat intolerance noted over the last couple of months, the patient does not endorse definite signs or symptoms suggestive of hyperthyroidism.  Of note that some of the symptoms might be masked by multiple sclerosis.  Differential diagnosis: Alemtuzumab induced thyroid dyfunction, Grave's disease.  Less likely subacute thyroiditis, Hashitoxicosis, given the progressive decline of TSH over the last 3 months.  Given the fairly recent onset and the degree of thyrotoxicosis, toxic multinodular goiter would be unlikely. She has positive family of autoimmune disease and MS which increase her risk for Grave's disease.  Of note that she does not have obvious evidence of Graves' ophthalmopathy.    Plan  Check TSI   If Thyroid US shows suspicious nodules, will proceed with thyroid uptake and scan to ensure absence of cold nodules  If TSI is positive, Grave's disease is confirmed, next step will be thyroid uptake and scan and LEE ablation   If TSI is negative, we will proceed with Thyroid uptake and scan, to confirm the diagnosis  We are reluctant to consider treatment with methimazole in the context of longstanding history of pancytopenia  We discussed about side effect of LEE therapy and also about precaution following LEE therapy   We will obtain her thyroid studies from the [facility]   Instructed the patient to check her heart rate at rest and contact us if her heart rate is above 90 (she has a blood pressure cuff at home).    #2  Multiple thyroid nodules, incidentally discovered during imaging tests  Overall, they had benign ultrasound features on the thyroid ultrasound from 2017.  Left #4 thyroid nodule was benign on biopsy.  The patient does not endorse local compressive neck symptoms.  Overall, very low risk of malignancy but would prefer to have the nodules reevaluated with both an ultrasound and a thyroid uptake and scan, before considering definitive radioiodine  ablation.    #Osteoporosis  The diagnosis is based on the history of left femoral fracture in 2018, after falling from a standing height.  Of note the patient does not have a history of long-term treatment with steroids for MS.  Risk factors for osteoporosis identified: Postmenopausal status, MS/sedentary lifestyle, CKD, secondary hyperparathyroidism, treatment with PPI, recent thyrotoxicosis.  The patient denies taking alendronate, which was recommended in 6/2020  Due to time restraints, we are going to readdress the osteoporosis at her follow-up visit.  In preparation for this, we are going to try to obtain her prior DEXA scan studies from the [facility].     Plans discussed with Attending, Dr. Vikas Almendarez  PGY 4   Fellow  Division of Endocrinology    I, Neli Bean, was present with the fellow who participated in the service and in the documentation of the note.  I have verified the history and personally performed the physical exam and medical decision making.  I agree with the assessment and plan of care as documented in the note.     Addendum:  I have counseled the patient on the signs and symptoms of hyperthyroidism, the interpretation of the thyroid hormone levels, the etiology of Graves' disease.  Discussed with the patient that although the risk of developing leukopenia, anemia or thrombocytopenia from methimazole is very low, considering that she already has low blood counts, I would be reluctant to consider this medication.  Also counseled on potential long-term consequences of untreated hyperthyroidism: Cardiac disease, worsening osteoporosis.  In general, surgery is not recommended unless the patient has an additional indication for surgery, besides Graves'.  131I therapy is likely to result in permanent hypothyroidism requiring daily medication for life.  Radioiodine therapy may be associated with an increased risk of the development or worsening of Graves'  ophthalmopathy.  The patient has no evidence of Graves' ophthalmopathy but I did encourage her to try to quit smoking in preparation for potential radioiodine treatment.  I have counseled her on safety radiation precautions, stressing safety precautions relevant to her.     Contact radiation precautions instructions:  Sleep alone.  Kissing and sexual intercourse should be avoided  Avoid prolonged close contact with young children and pregnant women.  Wash your hands with soap and water after each visit to the bathroom  Flush the toilet 2 times after each use  Rinse the bathroom sink and tub after use  Drink plenty of liquids to assist in the removal of radioactive material that are circulating in the blood stream  Use separate eating utensils and wash them separately  Use separate towels and wash cloths and wash them separately to avoid cross contamination.  Avoid public transportation    Neli Bean MD    More than half of the visit time was spent counseling and coordinating care.

## 2020-12-30 LAB
T4 FREE SERPL-MCNC: 2.63 NG/DL (ref 0.76–1.46)
THYROGLOB AB SERPL IA-ACNC: <20 IU/ML (ref 0–40)
THYROPEROXIDASE AB SERPL-ACNC: <10 IU/ML
TSH SERPL DL<=0.005 MIU/L-ACNC: <0.01 MU/L (ref 0.4–4)

## 2021-01-05 ENCOUNTER — TRANSFERRED RECORDS (OUTPATIENT)
Dept: HEALTH INFORMATION MANAGEMENT | Facility: CLINIC | Age: 66
End: 2021-01-05

## 2021-01-05 DIAGNOSIS — E05.90 HYPERTHYROIDISM: Primary | ICD-10-CM

## 2021-01-05 LAB
CREAT SERPL-MCNC: 0.85 MG/DL (ref 0.57–1)
GFR SERPL CREATININE-BSD FRML MDRD: 72 ML/MIN/1.73
TSH SERPL-ACNC: <0.005 UIU/ML (ref 0.45–4.5)

## 2021-01-11 LAB — TSI SER-ACNC: 6.4 TSI INDEX

## 2021-01-14 ENCOUNTER — ANCILLARY PROCEDURE (OUTPATIENT)
Dept: ULTRASOUND IMAGING | Facility: CLINIC | Age: 66
End: 2021-01-14
Attending: INTERNAL MEDICINE
Payer: MEDICARE

## 2021-01-14 DIAGNOSIS — E04.2 MULTIPLE THYROID NODULES: ICD-10-CM

## 2021-01-14 PROCEDURE — 76536 US EXAM OF HEAD AND NECK: CPT | Mod: GC | Performed by: RADIOLOGY

## 2021-01-20 NOTE — PROGRESS NOTES
Outcome for 01/20/21 2:57 PM :Left Voicemail for patient to call back   Outcome for 01/21/21 1:16 PM :Left Voicemail for patient to call back  Outcome for 01/22/21 11:37 AM :Left Voicemail for patient to call back

## 2021-01-25 ENCOUNTER — VIRTUAL VISIT (OUTPATIENT)
Dept: ENDOCRINOLOGY | Facility: CLINIC | Age: 66
End: 2021-01-25
Payer: MEDICARE

## 2021-01-25 DIAGNOSIS — E05.00 GRAVES' DISEASE: Primary | ICD-10-CM

## 2021-01-25 PROCEDURE — 99214 OFFICE O/P EST MOD 30 MIN: CPT | Mod: 95 | Performed by: INTERNAL MEDICINE

## 2021-01-25 RX ORDER — PROPRANOLOL HYDROCHLORIDE 20 MG/1
10 TABLET ORAL 3 TIMES DAILY
Qty: 270 TABLET | Refills: 0 | Status: SHIPPED | OUTPATIENT
Start: 2021-01-25 | End: 2021-01-25

## 2021-01-25 RX ORDER — PROPRANOLOL HYDROCHLORIDE 10 MG/1
10 TABLET ORAL 3 TIMES DAILY
Qty: 270 TABLET | Refills: 3 | Status: SHIPPED | OUTPATIENT
Start: 2021-01-25 | End: 2021-06-10

## 2021-01-25 NOTE — Clinical Note
Please schedule pt for thyroid scan and iodine treatment - pt prefers in 1 week. Labs should be done 1 day prior to treatment.

## 2021-01-25 NOTE — PROGRESS NOTES
Video-Visit Details    Type of service:  Video Visit    Video call duration:   Start: 01/25/2021 09:06 am  Stop: 01/25/2021 09:41 am    Originating Location (pt. Location): Home  Distant Location (provider location):  [facility]   Platform used for Video Visit: Dimitris    Due to the COVID 19 pandemic this visit was converted to a video visit in order to help prevent spread of infection in this patient and the general population.     HISTORY OF PRESENT ILLNESS    The patient is accompanied by her .     She was on Lemtrada for 2 years in 2017 and 2018/2019  She sees Dr. Redman, at [facility].  The patient has had the thyroid hormone levels checked quite frequently, through her neurology clinic, given prior history of treatment with Lemtrada, in [id number]. According to the available records, the TSH was normal in 2018, 2019 and June 2020.  On 6/26 9/20, TSH level was 2.  The lab work from December 10th revealed an undetectable TSH and an elevated free T4, of 3.3.  As per patient, she was told for the first time the thyroid hormone levels are elevated 3 months prior to December lab work.    Of note, she had low TSH of 0.13 and 0.18 in 11/2011. It improved spontaneously to 1.39 in 4/2013. Prior to the Lemtrada, she was on beta interferon since 1993, for almost 20 years.  Denies long-term treatment with steroids.  Last time she remembers being treated with a short 6-day course of steroids was in 2017.    In 2018, she was incidentally diagnosed with thyroid nodules during a CAT scan.  The thyroid ultrasound from 9/7/2017 revealed multiple thyroid nodules.  The left inferior #4 thyroid nodule, measuring 2.2 cm, was benign on biopsy. Overall, the nodules had a benign ultrasound appearance, mostly spongiform.  The biopsied nodule had an internal macrocalcification, minimal internal vascularity, but no definite features suggestive of malignancy.  The patient has no prior history  of radiation exposure or a family history of thyroid disease.  On the follow-up thyroid ultrasound from 1/14/2021, there were no significant changes of the thyroid nodules.  I reviewed the ultrasound images.    The patient complains of severe fatigue and intolerance to warm temperatures, hot flashes, intermittent episodes of jitteriness and insomnia.  She has chronic dry eyes and watery eyes and, as per patient, these symptoms have been more prominent recently.  Since having cataract surgery in June last year, she has been having some difficulty with her vision and she has to switch from far away to close-up glasses.  At her last appointment, I advised her to start checking her heart rate at rest.  She starting doing this last Sunday, when her heart rate was 94.  Today, she checked her heart rate 3 times and she reports values of 105, 97 and 101.  Denies tremors.    Pertinent labs reviewed:  7/3/2020 calcium 10  2011 cortisol 23.2  GFR in the 40s    12/3/20:  WBC 3.6   RBC 3.26  Hb 9.8  H 29.6%  Platelets 163     9/11/2018  Vitamin D 83.4  Calcium 9.2    REVIEW OF SYSTEMS  Worsening muscle weakness.  10 system ROS otherwise as per the HPI or negative    Past Medical History  Past Medical History:   Diagnosis Date     Gastro-oesophageal reflux disease      Multiple sclerosis (H)    May 2020, diagnosed with Covid  Depression  Pancytopenia dating back to at least 2014  Hyperlipidemia  Lung nodules  Candidate esophagitis  UTI  Left tibial and fibular fracture in 2013  Left tibial plateau fracture June 2020   Left femoral fracture in 10/2018, after falling from standing height  Fosamax was recommended in 6/2020 but the patient denies taking it.     Medications  Current Outpatient Medications   Medication     acetaminophen (TYLENOL) 500 MG tablet     alendronate (FOSAMAX) 70 MG tablet     baclofen (LIORESAL) 10 MG tablet     cholecalciferol (VITAMIN D3) 5000 units CAPS capsule     DULoxetine (CYMBALTA) 60 MG capsule      famotidine (PEPCID) 40 MG tablet     ferrous sulfate (IRON) 325 (65 Fe) MG tablet     gabapentin (NEURONTIN) 300 MG capsule     hydrochlorothiazide (HYDRODIURIL) 12.5 MG tablet     latanoprost (XALATAN) 0.005 % ophthalmic solution     multivitamin, therapeutic with minerals (THERA-VIT-M) TABS     NICOTROL 10 MG inhaler     omeprazole (PRILOSEC) 40 MG DR capsule     oxybutynin (DITROPAN-XL) 10 MG 24 hr tablet     oxyCODONE-acetaminophen (PERCOCET) 5-325 MG tablet     valACYclovir (VALTREX) 500 MG tablet     No current facility-administered medications for this visit.        Current Outpatient Medications   Medication Sig Dispense Refill     acetaminophen (TYLENOL) 500 MG tablet Take 1-2 tablets (500-1,000 mg) by mouth every 8 hours as needed for mild pain or fever (greater than 101 degrees)       alendronate (FOSAMAX) 70 MG tablet        baclofen (LIORESAL) 10 MG tablet TAKE 2TABLET BY MOUTH in AM AND 3 TABLETS BY MOUTH AT BEDTIME FOR 30 DAYS  11     cholecalciferol (VITAMIN D3) 5000 units CAPS capsule Take 7,000 Units by mouth daily        DULoxetine (CYMBALTA) 60 MG capsule TAKE 1 CAPSULE BY MOUTH EVERY DAY 90 capsule 1     famotidine (PEPCID) 40 MG tablet TAKE 1 TABLET BY MOUTH 2 TIMES DAILY 180 tablet 2     ferrous sulfate (IRON) 325 (65 Fe) MG tablet Take 1 tablet (325 mg) by mouth daily 100 tablet      gabapentin (NEURONTIN) 300 MG capsule Take 2 capsules (600 mg) by mouth daily before breakfast AND 3 capsules (900 mg) At Bedtime.       hydrochlorothiazide (HYDRODIURIL) 12.5 MG tablet TAKE 1 TABLET BY MOUTH EVERY DAY 90 tablet 1     latanoprost (XALATAN) 0.005 % ophthalmic solution INSTILL 1 DROP INTO BOTH EYES EVERY DAY AS DIRECTED PT WILL NEED TO BE SEEN FOR FUTURE REFILLS       multivitamin, therapeutic with minerals (THERA-VIT-M) TABS Take 1 tablet by mouth daily.       NICOTROL 10 MG inhaler Inhale 1 Cartridge into the lungs daily as needed for smoking cessation   1     omeprazole (PRILOSEC) 40 MG DR  capsule TAKE 1 CAPSULE BY MOUTH EVERY DAY (Patient not taking: Reported on 12/29/2020) 90 capsule 2     oxybutynin (DITROPAN-XL) 10 MG 24 hr tablet Take 1 tablet (10 mg) by mouth 2 times daily 30 tablet      oxyCODONE-acetaminophen (PERCOCET) 5-325 MG tablet        valACYclovir (VALTREX) 500 MG tablet Take 1 tablet (500 mg) by mouth 2 times daily 12 tablet 5       Allergies  Allergies   Allergen Reactions     Amantadine      hallucinations     Budesonide      Symbicort Rash         Family History  family history includes Cancer in her maternal grandfather; Heart Disease in her father, maternal grandmother, mother, and paternal grandfather.  She has positive family of autoimmune disease. Her mom has lupus and hypopituitarism. No family history of thyroid disease.  Maternal grandfather had throat cancer.    Social History  Social History     Tobacco Use     Smoking status: Former Smoker     Packs/day: 0.25     Types: Cigarettes     Smokeless tobacco: Former User     Quit date: 10/31/2018   Substance Use Topics     Alcohol use: Yes     Alcohol/week: 0.0 standard drinks     Comment: 1/wk     Drug use: No   She smokes infrequently.     Physical Exam  LMP  (LMP Unknown)   There is no height or weight on file to calculate BMI.  GENERAL :  In no apparent distress. Comfortable answering questions appropriately.  SKIN: Normal color, normal temperature, texture.    EYES: EOMI, No scleral icterus,  No proptosis, conjunctival redness, stare, retraction  NECK: No visible masses. THYROID:  No visible goiter  RESP:No wheezing or abnormal breath sound   NEURO: awake, alert, responds appropriately to questions.     Psychological: mentation appears normal, judgement and insight intact, normal speech  Physical exam is limited due to Phone visit related to COVID-19     DATA REVIEW    TSH   Date Value Ref Range Status   01/05/2021 <0.005 (L) 0.450 - 4.500 uIU/mL Final     T4 Total   Date Value Ref Range Status   02/28/2011 7.6 4.7 - 13.3  ug/dl Final     T4 Free   Date Value Ref Range Status   12/29/2020 2.63 (H) 0.76 - 1.46 ng/dL Final       ASSESSMENT/PLAN:     #Seropositive Graves' disease, formally diagnosed approximately 3 to 4 months ago  Clinically, the patient endorses worsening fatigue, heat intolerance, rapid heartbeats, episodes of jitteriness and a longstanding history of dry eyes.  On the limited video eye exam, there is no evidence of proptosis, lid lag or stare.  She also describes worsening muscle weakness, which is hard to interpret given her history of MS.    I had a long discussion with the patient regarding my recommendation of pursuing radioiodine treatment. Discussed with the patient that although the risk of developing leukopenia, anemia or thrombocytopenia from methimazole is very low, considering that she already has low blood counts, I would be reluctant to consider this medication.  Also counseled on potential long-term consequences of untreated hyperthyroidism: Cardiac disease, worsening osteoporosis. 131I therapy is likely to result in permanent hypothyroidism requiring daily treatment with thyroid hormone.  Radioiodine therapy may be associated with an increased risk of the development or worsening of Graves' ophthalmopathy. Clinically, the patient has no definite evidence of Graves' ophthalmopathy. For a short time following radioiodine treatment, the thyroid hormone levels are going to run higher.  Discussed about the importance of controlling the heart rate prior and following radioiodine treatment.  The expectation is for the thyroid nodules to shrink with radioiodine treatment.    Plan:  Start treatment with propranolol, at 10 mg 3 times daily  Check heart rate at rest and titrate the dose of propranolol by 10 mg 3 times daily, every 2 or 3 days, until heart rate at rest is below 90.  The patient is going to contact me on Wednesday afternoon, with the heart rate numbers.  Check the TFTs the day she receives the I123  dose for the thyroid scan  Thyroid uptake and scan to be ordered prior to radioiodine treatment.  Treatment to be scheduled the same day the thyroid uptake and scan is done.  Contact radiation precautions instructions to be followed for 5 days:   Sleep alone.  Kissing and sexual intercourse should be avoided  Avoid prolonged close contact with young children and pregnant women.  Wash your hands with soap and water after each visit to the bathroom  Flush the toilet 2 times after each use  Rinse the bathroom sink and tub after use  Drink plenty of liquids to assist in the removal of radioactive material that are circulating in the blood stream  Use separate eating utensils and wash them separately  Use separate towels and wash cloths and wash them separately to avoid cross contamination.  Avoid public transportation    More than half of the visit time was spent counseling and coordinating care.

## 2021-01-25 NOTE — LETTER
1/25/2021       RE: Marylee Woods  [address]  [facility] [address]     Dear Colleague,    Thank you for referring your patient, Marylee Woods, to the [facility] [place] [facility]. Please see a copy of my visit note below.    Outcome for 01/20/21 2:57 PM :Left Voicemail for patient to call back   Outcome for 01/21/21 1:16 PM :Left Voicemail for patient to call back  Outcome for 01/22/21 11:37 AM :Left Voicemail for patient to call back        Video-Visit Details    Type of service:  Video Visit    Video call duration:   Start: 01/25/2021 09:06 am  Stop: 01/25/2021 09:41 am    Originating Location (pt. Location): Home  Distant Location (provider location):  [facility]   Platform used for Video Visit: AmWell    Due to the COVID 19 pandemic this visit was converted to a video visit in order to help prevent spread of infection in this patient and the general population.     HISTORY OF PRESENT ILLNESS    The patient is accompanied by her .     She was on Lemtrada for 2 years in 2017 and 2018/2019  She sees Dr. Redman, at [facility].  The patient has had the thyroid hormone levels checked quite frequently, through her neurology clinic, given prior history of treatment with Lemtrada, in [id number]. According to the available records, the TSH was normal in 2018, 2019 and June 2020.  On 6/26 9/20, TSH level was 2.  The lab work from December 10th revealed an undetectable TSH and an elevated free T4, of 3.3.  As per patient, she was told for the first time the thyroid hormone levels are elevated 3 months prior to December lab work.    Of note, she had low TSH of 0.13 and 0.18 in 11/2011. It improved spontaneously to 1.39 in 4/2013. Prior to the Lemtrada, she was on beta interferon since 1993, for almost 20 years.  Denies long-term treatment with steroids.  Last time she remembers being treated with  a short 6-day course of steroids was in 2017.    In 2018, she was incidentally diagnosed with thyroid nodules during a CAT scan.  The thyroid ultrasound from 9/7/2017 revealed multiple thyroid nodules.  The left inferior #4 thyroid nodule, measuring 2.2 cm, was benign on biopsy. Overall, the nodules had a benign ultrasound appearance, mostly spongiform.  The biopsied nodule had an internal macrocalcification, minimal internal vascularity, but no definite features suggestive of malignancy.  The patient has no prior history of radiation exposure or a family history of thyroid disease.  On the follow-up thyroid ultrasound from 1/14/2021, there were no significant changes of the thyroid nodules.  I reviewed the ultrasound images.    The patient complains of severe fatigue and intolerance to warm temperatures, hot flashes, intermittent episodes of jitteriness and insomnia.  She has chronic dry eyes and watery eyes and, as per patient, these symptoms have been more prominent recently.  Since having cataract surgery in June last year, she has been having some difficulty with her vision and she has to switch from far away to close-up glasses.  At her last appointment, I advised her to start checking her heart rate at rest.  She starting doing this last Sunday, when her heart rate was 94.  Today, she checked her heart rate 3 times and she reports values of 105, 97 and 101.  Denies tremors.    Pertinent labs reviewed:  7/3/2020 calcium 10  2011 cortisol 23.2  GFR in the 40s    12/3/20:  WBC 3.6   RBC 3.26  Hb 9.8  H 29.6%  Platelets 163     9/11/2018  Vitamin D 83.4  Calcium 9.2    REVIEW OF SYSTEMS  Worsening muscle weakness.  10 system ROS otherwise as per the HPI or negative    Past Medical History  Past Medical History:   Diagnosis Date     Gastro-oesophageal reflux disease      Multiple sclerosis (H)    May 2020, diagnosed with Covid  Depression  Pancytopenia dating back to at least 2014  Hyperlipidemia  Lung  nodules  Candidate esophagitis  UTI  Left tibial and fibular fracture in 2013  Left tibial plateau fracture June 2020   Left femoral fracture in 10/2018, after falling from standing height  Fosamax was recommended in 6/2020 but the patient denies taking it.     Medications  Current Outpatient Medications   Medication     acetaminophen (TYLENOL) 500 MG tablet     alendronate (FOSAMAX) 70 MG tablet     baclofen (LIORESAL) 10 MG tablet     cholecalciferol (VITAMIN D3) 5000 units CAPS capsule     DULoxetine (CYMBALTA) 60 MG capsule     famotidine (PEPCID) 40 MG tablet     ferrous sulfate (IRON) 325 (65 Fe) MG tablet     gabapentin (NEURONTIN) 300 MG capsule     hydrochlorothiazide (HYDRODIURIL) 12.5 MG tablet     latanoprost (XALATAN) 0.005 % ophthalmic solution     multivitamin, therapeutic with minerals (THERA-VIT-M) TABS     NICOTROL 10 MG inhaler     omeprazole (PRILOSEC) 40 MG DR capsule     oxybutynin (DITROPAN-XL) 10 MG 24 hr tablet     oxyCODONE-acetaminophen (PERCOCET) 5-325 MG tablet     valACYclovir (VALTREX) 500 MG tablet     No current facility-administered medications for this visit.        Current Outpatient Medications   Medication Sig Dispense Refill     acetaminophen (TYLENOL) 500 MG tablet Take 1-2 tablets (500-1,000 mg) by mouth every 8 hours as needed for mild pain or fever (greater than 101 degrees)       alendronate (FOSAMAX) 70 MG tablet        baclofen (LIORESAL) 10 MG tablet TAKE 2TABLET BY MOUTH in AM AND 3 TABLETS BY MOUTH AT BEDTIME FOR 30 DAYS  11     cholecalciferol (VITAMIN D3) 5000 units CAPS capsule Take 7,000 Units by mouth daily        DULoxetine (CYMBALTA) 60 MG capsule TAKE 1 CAPSULE BY MOUTH EVERY DAY 90 capsule 1     famotidine (PEPCID) 40 MG tablet TAKE 1 TABLET BY MOUTH 2 TIMES DAILY 180 tablet 2     ferrous sulfate (IRON) 325 (65 Fe) MG tablet Take 1 tablet (325 mg) by mouth daily 100 tablet      gabapentin (NEURONTIN) 300 MG capsule Take 2 capsules (600 mg) by mouth daily  before breakfast AND 3 capsules (900 mg) At Bedtime.       hydrochlorothiazide (HYDRODIURIL) 12.5 MG tablet TAKE 1 TABLET BY MOUTH EVERY DAY 90 tablet 1     latanoprost (XALATAN) 0.005 % ophthalmic solution INSTILL 1 DROP INTO BOTH EYES EVERY DAY AS DIRECTED PT WILL NEED TO BE SEEN FOR FUTURE REFILLS       multivitamin, therapeutic with minerals (THERA-VIT-M) TABS Take 1 tablet by mouth daily.       NICOTROL 10 MG inhaler Inhale 1 Cartridge into the lungs daily as needed for smoking cessation   1     omeprazole (PRILOSEC) 40 MG DR capsule TAKE 1 CAPSULE BY MOUTH EVERY DAY (Patient not taking: Reported on 12/29/2020) 90 capsule 2     oxybutynin (DITROPAN-XL) 10 MG 24 hr tablet Take 1 tablet (10 mg) by mouth 2 times daily 30 tablet      oxyCODONE-acetaminophen (PERCOCET) 5-325 MG tablet        valACYclovir (VALTREX) 500 MG tablet Take 1 tablet (500 mg) by mouth 2 times daily 12 tablet 5       Allergies  Allergies   Allergen Reactions     Amantadine      hallucinations     Budesonide      Symbicort Rash         Family History  family history includes Cancer in her maternal grandfather; Heart Disease in her father, maternal grandmother, mother, and paternal grandfather.  She has positive family of autoimmune disease. Her mom has lupus and hypopituitarism. No family history of thyroid disease.  Maternal grandfather had throat cancer.    Social History  Social History     Tobacco Use     Smoking status: Former Smoker     Packs/day: 0.25     Types: Cigarettes     Smokeless tobacco: Former User     Quit date: 10/31/2018   Substance Use Topics     Alcohol use: Yes     Alcohol/week: 0.0 standard drinks     Comment: 1/wk     Drug use: No   She smokes infrequently.     Physical Exam  LMP  (LMP Unknown)   There is no height or weight on file to calculate BMI.  GENERAL :  In no apparent distress. Comfortable answering questions appropriately.  SKIN: Normal color, normal temperature, texture.    EYES: EOMI, No scleral icterus,  No  proptosis, conjunctival redness, stare, retraction  NECK: No visible masses. THYROID:  No visible goiter  RESP:No wheezing or abnormal breath sound   NEURO: awake, alert, responds appropriately to questions.     Psychological: mentation appears normal, judgement and insight intact, normal speech  Physical exam is limited due to Phone visit related to COVID-19     DATA REVIEW    TSH   Date Value Ref Range Status   01/05/2021 <0.005 (L) 0.450 - 4.500 uIU/mL Final     T4 Total   Date Value Ref Range Status   02/28/2011 7.6 4.7 - 13.3 ug/dl Final     T4 Free   Date Value Ref Range Status   12/29/2020 2.63 (H) 0.76 - 1.46 ng/dL Final       ASSESSMENT/PLAN:     #Seropositive Graves' disease, formally diagnosed approximately 3 to 4 months ago  Clinically, the patient endorses worsening fatigue, heat intolerance, rapid heartbeats, episodes of jitteriness and a longstanding history of dry eyes.  On the limited video eye exam, there is no evidence of proptosis, lid lag or stare.  She also describes worsening muscle weakness, which is hard to interpret given her history of MS.    I had a long discussion with the patient regarding my recommendation of pursuing radioiodine treatment. Discussed with the patient that although the risk of developing leukopenia, anemia or thrombocytopenia from methimazole is very low, considering that she already has low blood counts, I would be reluctant to consider this medication.  Also counseled on potential long-term consequences of untreated hyperthyroidism: Cardiac disease, worsening osteoporosis. 131I therapy is likely to result in permanent hypothyroidism requiring daily treatment with thyroid hormone.  Radioiodine therapy may be associated with an increased risk of the development or worsening of Graves' ophthalmopathy. Clinically, the patient has no definite evidence of Graves' ophthalmopathy. For a short time following radioiodine treatment, the thyroid hormone levels are going to run  higher.  Discussed about the importance of controlling the heart rate prior and following radioiodine treatment.  The expectation is for the thyroid nodules to shrink with radioiodine treatment.    Plan:  Start treatment with propranolol, at 10 mg 3 times daily  Check heart rate at rest and titrate the dose of propranolol by 10 mg 3 times daily, every 2 or 3 days, until heart rate at rest is below 90.  The patient is going to contact me on Wednesday afternoon, with the heart rate numbers.  Check the TFTs the day she receives the I123 dose for the thyroid scan  Thyroid uptake and scan to be ordered prior to radioiodine treatment.  Treatment to be scheduled the same day the thyroid uptake and scan is done.  Contact radiation precautions instructions to be followed for 5 days:   Sleep alone.  Kissing and sexual intercourse should be avoided  Avoid prolonged close contact with young children and pregnant women.  Wash your hands with soap and water after each visit to the bathroom  Flush the toilet 2 times after each use  Rinse the bathroom sink and tub after use  Drink plenty of liquids to assist in the removal of radioactive material that are circulating in the blood stream  Use separate eating utensils and wash them separately  Use separate towels and wash cloths and wash them separately to avoid cross contamination.  Avoid public transportation    More than half of the visit time was spent counseling and coordinating care.    Again, thank you for allowing me to participate in the care of your patient.      Sincerely,    Neli Bean MD

## 2021-01-26 ENCOUNTER — TELEPHONE (OUTPATIENT)
Dept: ENDOCRINOLOGY | Facility: CLINIC | Age: 66
End: 2021-01-26

## 2021-01-26 NOTE — TELEPHONE ENCOUNTER
LVM for pt to c/b to schedule Thyroid uptake & Scan with radiology. Pt to have therapy on day of scan. Page Dr Bean for dose.        Neli Bean MD  [id number]             Please schedule pt for thyroid scan and iodine treatment - pt prefers in 1 week. Labs should be done 1 day prior to treatment.

## 2021-02-08 ENCOUNTER — HOSPITAL ENCOUNTER (OUTPATIENT)
Dept: NUCLEAR MEDICINE | Facility: CLINIC | Age: 66
Setting detail: NUCLEAR MEDICINE
Discharge: HOME | End: 2021-02-08
Attending: INTERNAL MEDICINE | Admitting: INTERNAL MEDICINE
Payer: MEDICARE

## 2021-02-08 DIAGNOSIS — E05.00 GRAVES' DISEASE: ICD-10-CM

## 2021-02-08 PROCEDURE — A9516 IODINE I-123 SOD IODIDE MIC: HCPCS | Performed by: INTERNAL MEDICINE

## 2021-02-08 PROCEDURE — 78014 THYROID IMAGING W/BLOOD FLOW: CPT | Mod: 26

## 2021-02-08 PROCEDURE — 343N000001 HC RX 343: Performed by: INTERNAL MEDICINE

## 2021-02-08 PROCEDURE — G1004 CDSM NDSC: HCPCS | Mod: GC

## 2021-02-08 PROCEDURE — G1004 CDSM NDSC: HCPCS

## 2021-02-08 RX ADMIN — Medication 300 UCI.: at 08:00

## 2021-02-09 ENCOUNTER — HOSPITAL ENCOUNTER (OUTPATIENT)
Dept: NUCLEAR MEDICINE | Facility: CLINIC | Age: 66
Setting detail: NUCLEAR MEDICINE
End: 2021-02-09
Attending: INTERNAL MEDICINE
Payer: MEDICARE

## 2021-02-09 DIAGNOSIS — E05.00 GRAVES' DISEASE: ICD-10-CM

## 2021-02-09 PROCEDURE — G1004 CDSM NDSC: HCPCS | Mod: GC | Performed by: RADIOLOGY

## 2021-02-09 PROCEDURE — 344N000001 HC RX 344: Performed by: INTERNAL MEDICINE

## 2021-02-09 PROCEDURE — G1004 CDSM NDSC: HCPCS

## 2021-02-09 PROCEDURE — 79005 NUCLEAR RX ORAL ADMIN: CPT | Mod: 26 | Performed by: RADIOLOGY

## 2021-02-09 PROCEDURE — A9517 I131 IODIDE CAP, RX: HCPCS | Performed by: INTERNAL MEDICINE

## 2021-02-09 RX ADMIN — Medication 9.3 MCI.: at 11:02

## 2021-02-16 ENCOUNTER — TELEPHONE (OUTPATIENT)
Dept: ENDOCRINOLOGY | Facility: CLINIC | Age: 66
End: 2021-02-16

## 2021-02-16 DIAGNOSIS — E05.00 GRAVES' DISEASE: Primary | ICD-10-CM

## 2021-02-16 NOTE — TELEPHONE ENCOUNTER
[facility] Call Center    Phone Message    May a detailed message be left on voicemail: yes     Reason for Call: Other: Patient had radiation done on 02/09/2021 and she was wondering when she should schedule her FU with Dr. Bean. Please call patient to advise. Thank you.      Action Taken: Message routed to:  [facility]    Travel Screening: Not Applicable

## 2021-02-24 NOTE — PROGRESS NOTES
Outcome for 02/24/21 1:30 PM :Left Voicemail for patient to call back  Outcome for 02/25/21 12:30 PM :Unable to Leave VM   Patient reminded to log onto MyLikes 10 mintues before visit     Marylee Woods  is being evaluated via a billable video visit.      How would you like to obtain your AVS? Big Stage  For the video visit, send the invitation by: via [employer]  Will anyone else be joining your video visit? No

## 2021-02-25 ENCOUNTER — TELEPHONE (OUTPATIENT)
Dept: ENDOCRINOLOGY | Facility: CLINIC | Age: 66
End: 2021-02-25

## 2021-02-25 NOTE — TELEPHONE ENCOUNTER
[facility] Call Center    Phone Message    May a detailed message be left on voicemail: yes     Reason for Call:     Please call pt back due to missed call from clinic yesterday.     Action Taken: Message routed to:  [facility] endo    Travel Screening: Not Applicable

## 2021-03-01 ENCOUNTER — VIRTUAL VISIT (OUTPATIENT)
Dept: ENDOCRINOLOGY | Facility: CLINIC | Age: 66
End: 2021-03-01
Payer: MEDICARE

## 2021-03-01 DIAGNOSIS — E04.2 MULTIPLE THYROID NODULES: ICD-10-CM

## 2021-03-01 DIAGNOSIS — E05.00 GRAVES' DISEASE: Primary | ICD-10-CM

## 2021-03-01 DIAGNOSIS — M81.0 OSTEOPOROSIS OF MULTIPLE SITES: ICD-10-CM

## 2021-03-01 PROCEDURE — 99215 OFFICE O/P EST HI 40 MIN: CPT | Mod: 95 | Performed by: INTERNAL MEDICINE

## 2021-03-01 NOTE — LETTER
3/1/2021       RE: Marylee Woods  [address]  [facility] [address]     Dear Colleague,    Thank you for referring your patient, Marylee Woods, to the [facility] [place] at [facility]. Please see a copy of my visit note below.    Outcome for 02/24/21 1:30 PM :Left Voicemail for patient to call back  Outcome for 02/25/21 12:30 PM :Unable to Leave VM   Patient reminded to log onto [place] 10 mintues before visit     Marylee Woods  is being evaluated via a billable video visit.      How would you like to obtain your AVS? [employer]  For the video visit, send the invitation by: via [employer]  Will anyone else be joining your video visit? No            Video-Visit Details    Type of service:  Video Visit    Video call duration:   Start: 03/01/2021 11:16 am  Stop: 03/01/2021 11:57 am    Originating Location (pt. Location): Home  Distant Location (provider location):  [employer]  Platform used for Video Visit: Well    Due to the COVID 19 pandemic this visit was converted to a video visit in order to help prevent spread of infection in this patient and the general population.     HISTORY OF PRESENT ILLNESS    The patient is accompanied by her .     1. Graves disease   Marylee follows up with Dr. Redman for MS, at [facility].  The patient has had the thyroid hormone levels checked quite frequently, through her neurology clinic, given prior history of treatment with Lemtrada, in [id number]. The TSH was normal in 2018, 2019 and June 2020.  On 6/26/20, TSH level was 2.  The lab work from December 10th revealed an undetectable TSH and an elevated free T4, of 3.3.  As per patient, she was told for the first time the thyroid hormone levels are elevated 3 months prior to December lab work.    Of note, she had low TSH of 0.13 and 0.18 in 11/2011. It improved spontaneously to 1.39 in 4/2013. Prior to the Lemtrada, she was on beta  interferon since 1993, for almost 20 years.  Denies long-term treatment with steroids.  Last time she remembers being treated with a short 6-day course of steroids was in 2017.    In 2017, she was incidentally diagnosed with thyroid nodules during a CAT scan.  The thyroid ultrasound from 9/7/2017 revealed multiple thyroid nodules.  The left inferior #4 thyroid nodule, measuring 2.2 cm, was benign on biopsy. Overall, the nodules had a benign ultrasound appearance, mostly spongiform.  The biopsied nodule had an internal macrocalcification, minimal internal vascularity, but no definite features suggestive of malignancy.  The patient has no prior history of radiation exposure or a family history of thyroid disease.  On the follow-up thyroid ultrasound from 1/14/2021, there were no significant changes of the thyroid nodules and the thyroid gland was very vascular (images reviewed by me).  TSI was 6.4, on 1/11/21.     Subsequently, the patient underwent treatment with 9.3 mCi I-131, on 2/9/2021.  Prior to treatment, the scan revealed a 24-hour uptake of 87.5%, with a computer estimated weight of 42.2 g.  The radiotracer uptake was uniform, with the exception of the caudal region in the inferior left thyroid lobe, which corresponded to the nodule which was biopsied in 2017.  I reviewed the thyroid uptake and scan images.    At her prior visit, the patient complained of severe fatigue and intolerance to warm temperatures, hot flashes, intermittent episodes of jitteriness and insomnia.  She has chronic dry eyes and watery eyes and these symptoms have been more prominent since being diagnosed with hyperthyroidism.  Since having cataract surgery in June 2020, she has been having some difficulty with her vision and she has to switch from far away to close-up glasses.     Overall, the patient reports sleeping a little better.  She continues to feel tired.  The hot flashes have improved but they are still present.  Last week, her  heart rate has been in the 70s to 80s.  She does not know what was her baseline heart rate prior to developing hyperthyroidism.  She has been compliant in taking propranolol, 10 mg 3 times a day.      The patient has lab work drawn every month for her neurology clinic.  Advised to send us the thyroid hormone levels if those are checked by her neurologist.    2. Osteoporosis     Marylee has never had a DEXA scan done in the past.  She confirms the following fractures:   Right nondisplaced intertrochanteric fracture.   Left tibial and fibular fracture   2020 Left tibial plateau fracture   10/2018 Transverse fracture of the distal left femoral diaphysis, after falling from standing height.  I reviewed the prior XRays documented in our system.    Treatment with Fosamax was recommended by her orthopedic surgeon, in .  The patient reports being compliant in taking 1 tablet weekly.    For the last 5 years, she has been using a wheelchair.  Currently, she undergoes physical therapy, twice a week, with a goal of being able to use a walker.  She is not aware of decreased height over the years.  She does not drink milk and she only has cheese, intermittently.  Treatment with omeprazole was discontinued and it was replaced by Pepcid.  She does have heartburns, intermittently.  Marylee smoked for approximately 13 years.  She quit smoking in , for 10 years, and then she resumed.  Currently, she smokes 0 to 3 cigarettes a week.    I reviewed the current dosages of calcium and vitamin D she has been takin mg calcium +800 units vitamin D daily  7000 units vitamin D daily  A multivitamin which contains 1000 units vitamin D daily    Additional minerals:  Iron 85 mg daily   Zinc 50 mg   Magnesium 400 mg daily     Pertinent labs reviewed:    2021 GFR in the 70s     7/3/2020 calcium 10   cortisol 23.2  GFR in the 40s    12/3/20:  WBC 3.6   RBC 3.26  Hb 9.8  H 29.6%  Platelets 163     2018  Vitamin D  83.4  Calcium 9.2    REVIEW OF SYSTEMS  Generalized muscle weakness  10 point review of systems negative unless specified above    Past Medical History  Past Medical History:   Diagnosis Date     Gastro-oesophageal reflux disease      Multiple sclerosis (H) 1990s   May 2020, diagnosed with Covid  Depression  Pancytopenia dating back to at least 2014  Hyperlipidemia  Lung nodules  Candidate esophagitis  UTI  R intertrochanteric femoral fracture 2009   Left tibial and fibular fracture in 2013  Left tibial plateau fracture June 2020   Left femoral fracture in 10/2018, after falling from standing height (above the knee)    Medications    Current Outpatient Medications:      acetaminophen (TYLENOL) 500 MG tablet, Take 1-2 tablets (500-1,000 mg) by mouth every 8 hours as needed for mild pain or fever (greater than 101 degrees), Disp: , Rfl:      alendronate (FOSAMAX) 70 MG tablet, , Disp: , Rfl:      baclofen (LIORESAL) 10 MG tablet, TAKE 2TABLET BY MOUTH in AM AND 3 TABLETS BY MOUTH AT BEDTIME FOR 30 DAYS, Disp: , Rfl: 11     cholecalciferol (VITAMIN D3) 5000 units CAPS capsule, Take 7,000 Units by mouth daily , Disp: , Rfl:      DULoxetine (CYMBALTA) 60 MG capsule, TAKE 1 CAPSULE BY MOUTH EVERY DAY, Disp: 90 capsule, Rfl: 1     famotidine (PEPCID) 40 MG tablet, TAKE 1 TABLET BY MOUTH 2 TIMES DAILY, Disp: 180 tablet, Rfl: 2     ferrous sulfate (IRON) 325 (65 Fe) MG tablet, Take 1 tablet (325 mg) by mouth daily, Disp: 100 tablet, Rfl:      gabapentin (NEURONTIN) 300 MG capsule, Take 2 capsules (600 mg) by mouth daily before breakfast AND 3 capsules (900 mg) At Bedtime., Disp: , Rfl:      hydrochlorothiazide (HYDRODIURIL) 12.5 MG tablet, Take 1 tablet (12.5 mg) by mouth daily, Disp: 90 tablet, Rfl: 0     latanoprost (XALATAN) 0.005 % ophthalmic solution, INSTILL 1 DROP INTO BOTH EYES EVERY DAY AS DIRECTED PT WILL NEED TO BE SEEN FOR FUTURE REFILLS, Disp: , Rfl:      multivitamin, therapeutic with minerals (THERA-VIT-M)  TABS, Take 1 tablet by mouth daily., Disp: , Rfl:      oxybutynin (DITROPAN-XL) 10 MG 24 hr tablet, Take 1 tablet (10 mg) by mouth 2 times daily, Disp: 30 tablet, Rfl:      propranolol (INDERAL) 10 MG tablet, Take 1 tablet (10 mg) by mouth 3 times daily, Disp: 270 tablet, Rfl: 3     valACYclovir (VALTREX) 500 MG tablet, Take 1 tablet (500 mg) by mouth 2 times daily, Disp: 12 tablet, Rfl: 5     NICOTROL 10 MG inhaler, Inhale 1 Cartridge into the lungs daily as needed for smoking cessation , Disp: , Rfl: 1     omeprazole (PRILOSEC) 40 MG DR capsule, TAKE 1 CAPSULE BY MOUTH EVERY DAY (Patient not taking: Reported on 12/29/2020), Disp: 90 capsule, Rfl: 2     oxyCODONE-acetaminophen (PERCOCET) 5-325 MG tablet, , Disp: , Rfl:     Allergies  Allergies   Allergen Reactions     Amantadine      hallucinations     Budesonide      Symbicort Rash     Family History  family history includes Cancer in her maternal grandfather; Heart Disease in her father, maternal grandmother, mother, and paternal grandfather.  She has positive family of autoimmune disease. Her mom has lupus and hypopituitarism. No family history of thyroid disease.  Maternal grandfather had throat cancer. Type 1 diabetes - maternal grandmother. Diabetes - maternal aunt.     Social History  , 2 children.   Social History     Tobacco Use     Smoking status: Former Smoker     Packs/day: 0.25     Types: Cigarettes     Smokeless tobacco: Former User     Quit date: 10/31/2018   Substance Use Topics     Alcohol use: Yes     Alcohol/week: 0.0 standard drinks     Comment: 1/wk     Drug use: No   She smokes infrequently.     Physical Exam  BP Readings from Last 6 Encounters:   07/03/20 116/66   03/11/20 108/64   10/23/19 106/68   05/04/19 108/64   04/27/19 102/66   01/16/19 156/74     LMP  (LMP Unknown)   There is no height or weight on file to calculate BMI.  Physical Exam  General Appearance: alert, no distress noted   Eyes: grossly normal to inspection,  conjunctivae and sclerae normal, no lid lag or stare   Respiratory: no audible wheeze, cough, or visible cyanosis.  No visible retractions or increased work of breathing.  Able to speak fully in complete sentences.  Neurological: Cranial nerves grossly intact, mentation intact and speech normal; no tremor of the hands   Skin: no lesions on exposed skin   Psychological: mentation appears normal, affect normal, judgement and insight intact, normal speech and appearance well-groomed    DATA REVIEW    TSH   Date Value Ref Range Status   01/05/2021 <0.005 (L) 0.450 - 4.500 uIU/mL Final     T4 Total   Date Value Ref Range Status   02/28/2011 7.6 4.7 - 13.3 ug/dl Final     T4 Free   Date Value Ref Range Status   12/29/2020 2.63 (H) 0.76 - 1.46 ng/dL Final       ASSESSMENT/PLAN:     1. Seropositive Graves' disease, formally diagnosed in October 2020, status post radioiodine ablation with 9.3 mCi I-131, on 2/9/2021.  Recommendations:  Have the thyroid hormone levels checked 6 weeks post radioiodine treatment  If her neurologist draws the thyroid hormone levels, she should have the lab results forwarded to us  Quit smoking.  Clinically, she does not have evidence of Graves' ophthalmopathy but, given her eye symptoms and her history, I recommended to have a comprehensive eye exam done.    2.  Osteoporosis  The diagnosis is based on the prior history of femoral fractures after falling from a standing height, in 2009 and 2018.  Risk factors for osteoporosis identified: Postmenopausal status, sedentary lifestyle due to MS, prior treatment with PPI, smoking.    Since 2018, the patient has been medicated with Fosamax.  She takes a fairly high dose of vitamin D.  Her calcium levels have been high normal on recent lab work.    Recommendations:  Check calcium, albumin, phosphorus, PTH levels  Schedule DEX scan of LS, left femur and wrist  I am going to contact the patient with the lab results and determine long-term follow-up.    3.  Thyroid nodules  Consider a f/up thyroid US in August 2021, to f/up on the thyroid nodules.     Pt to have labs done in 3-4 weeks.   F/up to be scheduled pending the labs and DXA scan results.    Orders Placed This Encounter   Procedures     Dexa hip/pelvis/spine     Dexa Wrist/Heel     Albumin level     Phosphorus     25 Hydroxyvitamin D2 and D3     Parathyroid Hormone Intact     Basic metabolic panel     Bone specific alk phosphatase     TSH     T4 free     Over 50% of visit time was spend counseling and coordinating care.     Again, thank you for allowing me to participate in the care of your patient.      Sincerely,    Neli Bean MD

## 2021-03-01 NOTE — PROGRESS NOTES
Video-Visit Details    Type of service:  Video Visit    Video call duration:   Start: 03/01/2021 11:16 am  Stop: 03/01/2021 11:57 am    Originating Location (pt. Location): Home  Distant Location (provider location):  [facility]  Platform used for Video Visit: [employer]    Due to the COVID 19 pandemic this visit was converted to a video visit in order to help prevent spread of infection in this patient and the general population.     HISTORY OF PRESENT ILLNESS    The patient is accompanied by her .     1. Graves disease   Marylee follows up with Dr. Redman for MS, at [facility].  The patient has had the thyroid hormone levels checked quite frequently, through her neurology clinic, given prior history of treatment with Lemtrada, in [id number]. The TSH was normal in 2018, 2019 and June 2020.  On 6/26/20, TSH level was 2.  The lab work from December 10th revealed an undetectable TSH and an elevated free T4, of 3.3.  As per patient, she was told for the first time the thyroid hormone levels are elevated 3 months prior to December lab work.    Of note, she had low TSH of 0.13 and 0.18 in 11/2011. It improved spontaneously to 1.39 in 4/2013. Prior to the Lemtrada, she was on beta interferon since 1993, for almost 20 years.  Denies long-term treatment with steroids.  Last time she remembers being treated with a short 6-day course of steroids was in 2017.    In 2017, she was incidentally diagnosed with thyroid nodules during a CAT scan.  The thyroid ultrasound from 9/7/2017 revealed multiple thyroid nodules.  The left inferior #4 thyroid nodule, measuring 2.2 cm, was benign on biopsy. Overall, the nodules had a benign ultrasound appearance, mostly spongiform.  The biopsied nodule had an internal macrocalcification, minimal internal vascularity, but no definite features suggestive of malignancy.  The patient has no prior history of radiation exposure or a family history of thyroid disease.  On the  follow-up thyroid ultrasound from 1/14/2021, there were no significant changes of the thyroid nodules and the thyroid gland was very vascular (images reviewed by me).  TSI was 6.4, on 1/11/21.     Subsequently, the patient underwent treatment with 9.3 mCi I-131, on 2/9/2021.  Prior to treatment, the scan revealed a 24-hour uptake of 87.5%, with a computer estimated weight of 42.2 g.  The radiotracer uptake was uniform, with the exception of the caudal region in the inferior left thyroid lobe, which corresponded to the nodule which was biopsied in 2017.  I reviewed the thyroid uptake and scan images.    At her prior visit, the patient complained of severe fatigue and intolerance to warm temperatures, hot flashes, intermittent episodes of jitteriness and insomnia.  She has chronic dry eyes and watery eyes and these symptoms have been more prominent since being diagnosed with hyperthyroidism.  Since having cataract surgery in June 2020, she has been having some difficulty with her vision and she has to switch from far away to close-up glasses.     Overall, the patient reports sleeping a little better.  She continues to feel tired.  The hot flashes have improved but they are still present.  Last week, her heart rate has been in the 70s to 80s.  She does not know what was her baseline heart rate prior to developing hyperthyroidism.  She has been compliant in taking propranolol, 10 mg 3 times a day.      The patient has lab work drawn every month for her neurology clinic.  Advised to send us the thyroid hormone levels if those are checked by her neurologist.    2. Osteoporosis     Marylee has never had a DEXA scan done in the past.  She confirms the following fractures:  2009 Right nondisplaced intertrochanteric fracture.  2013 Left tibial and fibular fracture   6/2020 Left tibial plateau fracture   10/2018 Transverse fracture of the distal left femoral diaphysis, after falling from standing height.  I reviewed the prior  XRays documented in our system.    Treatment with Fosamax was recommended by her orthopedic surgeon, in 2018.  The patient reports being compliant in taking 1 tablet weekly.    For the last 5 years, she has been using a wheelchair.  Currently, she undergoes physical therapy, twice a week, with a goal of being able to use a walker.  She is not aware of decreased height over the years.  She does not drink milk and she only has cheese, intermittently.  Treatment with omeprazole was discontinued and it was replaced by Pepcid.  She does have heartburns, intermittently.  Marylee smoked for approximately 13 years.  She quit smoking in , for 10 years, and then she resumed.  Currently, she smokes 0 to 3 cigarettes a week.    I reviewed the current dosages of calcium and vitamin D she has been takin mg calcium +800 units vitamin D daily  7000 units vitamin D daily  A multivitamin which contains 1000 units vitamin D daily    Additional minerals:  Iron 85 mg daily   Zinc 50 mg   Magnesium 400 mg daily     Pertinent labs reviewed:    2021 GFR in the 70s     7/3/2020 calcium 10   cortisol 23.2  GFR in the 40s    12/3/20:  WBC 3.6   RBC 3.26  Hb 9.8  H 29.6%  Platelets 163     2018  Vitamin D 83.4  Calcium 9.2    REVIEW OF SYSTEMS  Generalized muscle weakness  10 point review of systems negative unless specified above    Past Medical History  Past Medical History:   Diagnosis Date     Gastro-oesophageal reflux disease      Multiple sclerosis (H) 1990s   May 2020, diagnosed with Covid  Depression  Pancytopenia dating back to at least   Hyperlipidemia  Lung nodules  Candidate esophagitis  UTI  R intertrochanteric femoral fracture    Left tibial and fibular fracture in   Left tibial plateau fracture 2020   Left femoral fracture in 10/2018, after falling from standing height (above the knee)    Medications    Current Outpatient Medications:      acetaminophen (TYLENOL) 500 MG tablet, Take 1-2  tablets (500-1,000 mg) by mouth every 8 hours as needed for mild pain or fever (greater than 101 degrees), Disp: , Rfl:      alendronate (FOSAMAX) 70 MG tablet, , Disp: , Rfl:      baclofen (LIORESAL) 10 MG tablet, TAKE 2TABLET BY MOUTH in AM AND 3 TABLETS BY MOUTH AT BEDTIME FOR 30 DAYS, Disp: , Rfl: 11     cholecalciferol (VITAMIN D3) 5000 units CAPS capsule, Take 7,000 Units by mouth daily , Disp: , Rfl:      DULoxetine (CYMBALTA) 60 MG capsule, TAKE 1 CAPSULE BY MOUTH EVERY DAY, Disp: 90 capsule, Rfl: 1     famotidine (PEPCID) 40 MG tablet, TAKE 1 TABLET BY MOUTH 2 TIMES DAILY, Disp: 180 tablet, Rfl: 2     ferrous sulfate (IRON) 325 (65 Fe) MG tablet, Take 1 tablet (325 mg) by mouth daily, Disp: 100 tablet, Rfl:      gabapentin (NEURONTIN) 300 MG capsule, Take 2 capsules (600 mg) by mouth daily before breakfast AND 3 capsules (900 mg) At Bedtime., Disp: , Rfl:      hydrochlorothiazide (HYDRODIURIL) 12.5 MG tablet, Take 1 tablet (12.5 mg) by mouth daily, Disp: 90 tablet, Rfl: 0     latanoprost (XALATAN) 0.005 % ophthalmic solution, INSTILL 1 DROP INTO BOTH EYES EVERY DAY AS DIRECTED PT WILL NEED TO BE SEEN FOR FUTURE REFILLS, Disp: , Rfl:      multivitamin, therapeutic with minerals (THERA-VIT-M) TABS, Take 1 tablet by mouth daily., Disp: , Rfl:      oxybutynin (DITROPAN-XL) 10 MG 24 hr tablet, Take 1 tablet (10 mg) by mouth 2 times daily, Disp: 30 tablet, Rfl:      propranolol (INDERAL) 10 MG tablet, Take 1 tablet (10 mg) by mouth 3 times daily, Disp: 270 tablet, Rfl: 3     valACYclovir (VALTREX) 500 MG tablet, Take 1 tablet (500 mg) by mouth 2 times daily, Disp: 12 tablet, Rfl: 5     NICOTROL 10 MG inhaler, Inhale 1 Cartridge into the lungs daily as needed for smoking cessation , Disp: , Rfl: 1     omeprazole (PRILOSEC) 40 MG DR capsule, TAKE 1 CAPSULE BY MOUTH EVERY DAY (Patient not taking: Reported on 12/29/2020), Disp: 90 capsule, Rfl: 2     oxyCODONE-acetaminophen (PERCOCET) 5-325 MG tablet, , Disp: , Rfl:      Allergies  Allergies   Allergen Reactions     Amantadine      hallucinations     Budesonide      Symbicort Rash     Family History  family history includes Cancer in her maternal grandfather; Heart Disease in her father, maternal grandmother, mother, and paternal grandfather.  She has positive family of autoimmune disease. Her mom has lupus and hypopituitarism. No family history of thyroid disease.  Maternal grandfather had throat cancer. Type 1 diabetes - maternal grandmother. Diabetes - maternal aunt.     Social History  , 2 children.   Social History     Tobacco Use     Smoking status: Former Smoker     Packs/day: 0.25     Types: Cigarettes     Smokeless tobacco: Former User     Quit date: 10/31/2018   Substance Use Topics     Alcohol use: Yes     Alcohol/week: 0.0 standard drinks     Comment: 1/wk     Drug use: No   She smokes infrequently.     Physical Exam  BP Readings from Last 6 Encounters:   07/03/20 116/66   03/11/20 108/64   10/23/19 106/68   05/04/19 108/64   04/27/19 102/66   01/16/19 156/74     LMP  (LMP Unknown)   There is no height or weight on file to calculate BMI.  Physical Exam  General Appearance: alert, no distress noted   Eyes: grossly normal to inspection, conjunctivae and sclerae normal, no lid lag or stare   Respiratory: no audible wheeze, cough, or visible cyanosis.  No visible retractions or increased work of breathing.  Able to speak fully in complete sentences.  Neurological: Cranial nerves grossly intact, mentation intact and speech normal; no tremor of the hands   Skin: no lesions on exposed skin   Psychological: mentation appears normal, affect normal, judgement and insight intact, normal speech and appearance well-groomed    DATA REVIEW    TSH   Date Value Ref Range Status   01/05/2021 <0.005 (L) 0.450 - 4.500 uIU/mL Final     T4 Total   Date Value Ref Range Status   02/28/2011 7.6 4.7 - 13.3 ug/dl Final     T4 Free   Date Value Ref Range Status   12/29/2020 2.63 (H) 0.76  - 1.46 ng/dL Final       ASSESSMENT/PLAN:     1. Seropositive Graves' disease, formally diagnosed in October 2020, status post radioiodine ablation with 9.3 mCi I-131, on 2/9/2021.  Recommendations:  Have the thyroid hormone levels checked 6 weeks post radioiodine treatment  If her neurologist draws the thyroid hormone levels, she should have the lab results forwarded to us  Quit smoking.  Clinically, she does not have evidence of Graves' ophthalmopathy but, given her eye symptoms and her history, I recommended to have a comprehensive eye exam done.    2.  Osteoporosis  The diagnosis is based on the prior history of femoral fractures after falling from a standing height, in 2009 and 2018.  Risk factors for osteoporosis identified: Postmenopausal status, sedentary lifestyle due to MS, prior treatment with PPI, smoking.    Since 2018, the patient has been medicated with Fosamax.  She takes a fairly high dose of vitamin D.  Her calcium levels have been high normal on recent lab work.    Recommendations:  Check calcium, albumin, phosphorus, PTH levels  Schedule DEX scan of LS, left femur and wrist  I am going to contact the patient with the lab results and determine long-term follow-up.    3. Thyroid nodules  Consider a f/up thyroid US in August 2021, to f/up on the thyroid nodules.     Pt to have labs done in 3-4 weeks.   F/up to be scheduled pending the labs and DXA scan results.    Orders Placed This Encounter   Procedures     Dexa hip/pelvis/spine     Dexa Wrist/Heel     Albumin level     Phosphorus     25 Hydroxyvitamin D2 and D3     Parathyroid Hormone Intact     Basic metabolic panel     Bone specific alk phosphatase     TSH     T4 free     Over 50% of visit time was spend counseling and coordinating care.

## 2021-03-31 ENCOUNTER — MYC MEDICAL ADVICE (OUTPATIENT)
Dept: FAMILY MEDICINE | Facility: CLINIC | Age: 66
End: 2021-03-31

## 2021-03-31 DIAGNOSIS — N39.0 FREQUENT UTI: ICD-10-CM

## 2021-03-31 DIAGNOSIS — R82.90 NONSPECIFIC FINDING ON EXAMINATION OF URINE: Primary | ICD-10-CM

## 2021-03-31 LAB
ALBUMIN UR-MCNC: NEGATIVE MG/DL
APPEARANCE UR: ABNORMAL
BACTERIA #/AREA URNS HPF: ABNORMAL /HPF
BILIRUB UR QL STRIP: NEGATIVE
COLOR UR AUTO: YELLOW
GLUCOSE UR STRIP-MCNC: NEGATIVE MG/DL
HGB UR QL STRIP: NEGATIVE
KETONES UR STRIP-MCNC: NEGATIVE MG/DL
LEUKOCYTE ESTERASE UR QL STRIP: ABNORMAL
NITRATE UR QL: POSITIVE
NON-SQ EPI CELLS #/AREA URNS LPF: ABNORMAL /LPF
PH UR STRIP: 5.5 PH (ref 5–7)
RBC #/AREA URNS AUTO: ABNORMAL /HPF
SOURCE: ABNORMAL
SP GR UR STRIP: 1.02 (ref 1–1.03)
UROBILINOGEN UR STRIP-ACNC: 0.2 EU/DL (ref 0.2–1)
WBC #/AREA URNS AUTO: ABNORMAL /HPF

## 2021-03-31 PROCEDURE — 87086 URINE CULTURE/COLONY COUNT: CPT | Performed by: FAMILY MEDICINE

## 2021-03-31 PROCEDURE — 87186 SC STD MICRODIL/AGAR DIL: CPT | Performed by: FAMILY MEDICINE

## 2021-03-31 PROCEDURE — 81001 URINALYSIS AUTO W/SCOPE: CPT | Performed by: FAMILY MEDICINE

## 2021-03-31 PROCEDURE — 87088 URINE BACTERIA CULTURE: CPT | Performed by: FAMILY MEDICINE

## 2021-04-01 ENCOUNTER — TELEPHONE (OUTPATIENT)
Dept: FAMILY MEDICINE | Facility: CLINIC | Age: 66
End: 2021-04-01

## 2021-04-01 DIAGNOSIS — N39.0 ACUTE UTI: ICD-10-CM

## 2021-04-01 LAB
BACTERIA SPEC CULT: ABNORMAL
BACTERIA SPEC CULT: ABNORMAL
Lab: ABNORMAL
SPECIMEN SOURCE: ABNORMAL

## 2021-04-01 RX ORDER — NITROFURANTOIN 25; 75 MG/1; MG/1
100 CAPSULE ORAL 2 TIMES DAILY
Qty: 14 CAPSULE | Refills: 0 | Status: SHIPPED | OUTPATIENT
Start: 2021-04-01 | End: 2021-04-14

## 2021-04-01 NOTE — TELEPHONE ENCOUNTER
"Can you call Marylee and let her know:  1. She has Urinary Tract Infection, I sent antibiotics.  2. In future when she has Urinary Tract Infection symptoms we're going to ask her to use E-visit - she can send to me OR send to \"1st available provider\" and will get a response within 1 year.    Thanks,  Dr. Carolina Pulliam MD / [facility]    "

## 2021-04-01 NOTE — TELEPHONE ENCOUNTER
Left message to call back and ask to speak with an available triage nurse.  SAEID LowN, RN  [facility]

## 2021-04-02 NOTE — RESULT ENCOUNTER NOTE
Good news - the antibiotic I sent should cover your infection well.  Let me know if you have any questions about this.  Best,  Dr. Carolina Pulliam MD/[facility]

## 2021-04-08 DIAGNOSIS — R82.90 NONSPECIFIC FINDING ON EXAMINATION OF URINE: ICD-10-CM

## 2021-04-08 DIAGNOSIS — R82.998 DARK URINE: Primary | ICD-10-CM

## 2021-04-08 LAB
ALBUMIN UR-MCNC: NEGATIVE MG/DL
APPEARANCE UR: CLEAR
BACTERIA #/AREA URNS HPF: ABNORMAL /HPF
BILIRUB UR QL STRIP: NEGATIVE
COLOR UR AUTO: ABNORMAL
GLUCOSE UR STRIP-MCNC: NEGATIVE MG/DL
HGB UR QL STRIP: NEGATIVE
KETONES UR STRIP-MCNC: NEGATIVE MG/DL
LEUKOCYTE ESTERASE UR QL STRIP: ABNORMAL
NITRATE UR QL: NEGATIVE
PH UR STRIP: 5 PH (ref 5–7)
RBC #/AREA URNS AUTO: ABNORMAL /HPF
SOURCE: ABNORMAL
SP GR UR STRIP: 1.01 (ref 1–1.03)
UROBILINOGEN UR STRIP-ACNC: 0.2 EU/DL (ref 0.2–1)
WBC #/AREA URNS AUTO: ABNORMAL /HPF

## 2021-04-08 PROCEDURE — 87086 URINE CULTURE/COLONY COUNT: CPT | Performed by: FAMILY MEDICINE

## 2021-04-08 PROCEDURE — 81001 URINALYSIS AUTO W/SCOPE: CPT | Performed by: FAMILY MEDICINE

## 2021-04-09 NOTE — RESULT ENCOUNTER NOTE
This needs a visit with a provider as initial antibiotics didn't work.  I see her message about concern charge but if she is sick she does need a visit.  urgent care would work if we don't have appointments.  This should not be an E-visit at this point.  Please let patient know.  Dr. Carolina Pulliam MD / [facility]

## 2021-04-10 ENCOUNTER — OFFICE VISIT (OUTPATIENT)
Dept: URGENT CARE | Facility: URGENT CARE | Age: 66
End: 2021-04-10
Payer: MEDICARE

## 2021-04-10 VITALS
SYSTOLIC BLOOD PRESSURE: 119 MMHG | OXYGEN SATURATION: 100 % | WEIGHT: 130 LBS | DIASTOLIC BLOOD PRESSURE: 74 MMHG | HEIGHT: 69 IN | HEART RATE: 84 BPM | BODY MASS INDEX: 19.26 KG/M2

## 2021-04-10 DIAGNOSIS — N34.2 INFECTIVE URETHRITIS: ICD-10-CM

## 2021-04-10 DIAGNOSIS — R30.0 DYSURIA: Primary | ICD-10-CM

## 2021-04-10 LAB
ALBUMIN UR-MCNC: NEGATIVE MG/DL
APPEARANCE UR: CLEAR
BACTERIA SPEC CULT: NORMAL
BILIRUB UR QL STRIP: NEGATIVE
COLOR UR AUTO: YELLOW
GLUCOSE UR STRIP-MCNC: NEGATIVE MG/DL
HGB UR QL STRIP: NEGATIVE
KETONES UR STRIP-MCNC: NEGATIVE MG/DL
LEUKOCYTE ESTERASE UR QL STRIP: ABNORMAL
Lab: NORMAL
NITRATE UR QL: NEGATIVE
PH UR STRIP: 5 PH (ref 5–7)
RBC #/AREA URNS AUTO: NORMAL /HPF
SOURCE: ABNORMAL
SP GR UR STRIP: 1.01 (ref 1–1.03)
SPECIMEN SOURCE: NORMAL
UROBILINOGEN UR STRIP-ACNC: 0.2 EU/DL (ref 0.2–1)
WBC #/AREA URNS AUTO: NORMAL /HPF

## 2021-04-10 PROCEDURE — 81001 URINALYSIS AUTO W/SCOPE: CPT | Performed by: PHYSICIAN ASSISTANT

## 2021-04-10 PROCEDURE — 99213 OFFICE O/P EST LOW 20 MIN: CPT | Performed by: PHYSICIAN ASSISTANT

## 2021-04-10 RX ORDER — CEPHALEXIN 500 MG/1
500 CAPSULE ORAL 3 TIMES DAILY
Qty: 21 CAPSULE | Refills: 0 | Status: SHIPPED | OUTPATIENT
Start: 2021-04-10 | End: 2021-04-14 | Stop reason: ALTCHOICE

## 2021-04-10 ASSESSMENT — ENCOUNTER SYMPTOMS
RESPIRATORY NEGATIVE: 1
FLANK PAIN: 0
DYSURIA: 1
FREQUENCY: 1
GASTROINTESTINAL NEGATIVE: 1
PSYCHIATRIC NEGATIVE: 1
HEMATURIA: 0
CARDIOVASCULAR NEGATIVE: 1
DIFFICULTY URINATING: 1
EYES NEGATIVE: 1
NEUROLOGICAL NEGATIVE: 1
CONSTITUTIONAL NEGATIVE: 1

## 2021-04-10 ASSESSMENT — MIFFLIN-ST. JEOR: SCORE: 1195.09

## 2021-04-10 NOTE — PROGRESS NOTES
Dysuri  - *UA reflex to Microscopic and Culture ([employer] and [facility] (except [place] and [place])  - Urine Microscopic  - cephALEXin (KEFLEX) 500 MG capsule; Take 1 capsule (500 mg) by mouth 3 times daily for 7 days    Infective urethritis  - cephALEXin (KEFLEX) 500 MG capsule; Take 1 capsule (500 mg) by mouth 3 times daily for 7 days    20 minutes spent on the date of the encounter doing chart review, history and exam, documentation and further activities per the note     See Patient Instructions  Patient Instructions     Patient Education     Understanding Urinary Tract Infections (UTIs)   Most UTIs are caused by bacteria, but they may also be caused by viruses or fungi. Bacteria from the bowel are the most common source of infection. The infection may start because of any of the following:     Sexual activity.  During sex, bacteria can travel from the penis, vagina, or rectum into the urethra.     Bacteria outside the rectum getting into the urethra.  Bacteria on the skin outside the rectum may travel into the urethra. This is more common in women since the rectum and urethra are closer to each other than in men. Wiping from front to back after using the toilet and keeping the area clean can help prevent germs from getting to the urethra.    Blocked urine flow through the urinary tract. If urine sits too long, germs may start to grow out of control.  Parts of the urinary tract  The infection can occur in any part of the urinary tract.       The kidneys. These organs collect and store urine.    The ureters. These tubes carry urine from the kidneys to the bladder.    The bladder. This holds urine until you are ready to let it out.    The urethra. This tube carries urine from the bladder out of the body. It is shorter in women, so bacteria can move through it more easily. The urethra is longer in men, so a UTI is less likely to reach the bladder or kidneys in men.  [employer] last reviewed this  educational content on 1/1/2020 2000-2021 [employer]. All rights reserved. This information is not intended as a substitute for professional medical care. Always follow your healthcare professional's instructions.               [facility] URGENT CARE    Subjective   65 year old year old who presents to clinic today for the following health issues:    Urgent Care and Dysuria       HPI     Genitourinary - Female  Onset/Duration: 2 weeks  Description:   Painful urination (Dysuria): YES           Frequency: YES  Blood in urine (Hematuria): no  Delay in urine (Hesitency): no  Intensity: moderate  Progression of Symptoms:  waxing and waning  Accompanying Signs & Symptoms:  Fever/chills: no  Flank pain: no  Nausea and vomiting: no  Vaginal symptoms: none  Abdominal/Pelvic Pain: no  Precipitating or alleviating factors: Patient was treated with Macrobid on 4/1/21 which alleviated her symptoms temporarily before returning yesterday.       Review of Systems   Review of Systems   Constitutional: Negative.    HENT: Negative.    Eyes: Negative.    Respiratory: Negative.    Cardiovascular: Negative.    Gastrointestinal: Negative.    Genitourinary: Positive for difficulty urinating, dysuria, frequency and urgency. Negative for flank pain, genital sores, hematuria, menstrual problem, vaginal bleeding, vaginal discharge and vaginal pain.   Neurological: Negative.    Psychiatric/Behavioral: Negative.         Objective        BP: 119/74 Pulse: 84     SpO2: 100 %       Physical Exam   Physical Exam  Constitutional:       General: She is not in acute distress.     Appearance: Normal appearance. She is normal weight. She is not ill-appearing, toxic-appearing or diaphoretic.   HENT:      Head: Normocephalic and atraumatic.   Cardiovascular:      Rate and Rhythm: Normal rate and regular rhythm.      Pulses: Normal pulses.      Heart sounds: Normal heart sounds. No murmur. No friction rub. No  gallop.    Pulmonary:      Effort: Pulmonary effort is normal.      Breath sounds: Normal breath sounds.   Abdominal:      General: Abdomen is flat. Bowel sounds are normal. There is no distension.      Palpations: Abdomen is soft.      Tenderness: There is no abdominal tenderness. There is no right CVA tenderness or left CVA tenderness.   Neurological:      General: No focal deficit present.      Mental Status: She is alert and oriented to person, place, and time. Mental status is at baseline.   Psychiatric:         Mood and Affect: Mood normal.         Behavior: Behavior normal.         Thought Content: Thought content normal.         Judgment: Judgment normal.

## 2021-04-10 NOTE — PATIENT INSTRUCTIONS
Patient Education     Understanding Urinary Tract Infections (UTIs)   Most UTIs are caused by bacteria, but they may also be caused by viruses or fungi. Bacteria from the bowel are the most common source of infection. The infection may start because of any of the following:     Sexual activity.  During sex, bacteria can travel from the penis, vagina, or rectum into the urethra.     Bacteria outside the rectum getting into the urethra.  Bacteria on the skin outside the rectum may travel into the urethra. This is more common in women since the rectum and urethra are closer to each other than in men. Wiping from front to back after using the toilet and keeping the area clean can help prevent germs from getting to the urethra.    Blocked urine flow through the urinary tract. If urine sits too long, germs may start to grow out of control.  Parts of the urinary tract  The infection can occur in any part of the urinary tract.       The kidneys. These organs collect and store urine.    The ureters. These tubes carry urine from the kidneys to the bladder.    The bladder. This holds urine until you are ready to let it out.    The urethra. This tube carries urine from the bladder out of the body. It is shorter in women, so bacteria can move through it more easily. The urethra is longer in men, so a UTI is less likely to reach the bladder or kidneys in men.  [employer] last reviewed this educational content on 1/1/2020 2000-2021 [employer]. All rights reserved. This information is not intended as a substitute for professional medical care. Always follow your healthcare professional's instructions.

## 2021-04-13 ENCOUNTER — TELEPHONE (OUTPATIENT)
Dept: FAMILY MEDICINE | Facility: CLINIC | Age: 66
End: 2021-04-13

## 2021-04-13 NOTE — TELEPHONE ENCOUNTER
Call received from patient.    This is follow up from 4/10/21 evaluation for UTI:  1. Started Keflex on 4/10/21  2. Symptoms persist: dysuria  3. New symptoms started on 4/10/21:   A. Pain across abdomen beneath ribs and above right hip   B. Sometimes a sharp pain   C. When presses on hip area pain is 10/10, otherwise it is moderate and tolerable   D. Overall pain seems a little better since 4/10/21   E. Urine is malodorous  4. Uses briefs due to incontinence caused by MS    Writer recommended office visit for re-evaluation and if pain worsens without palpation, be evaluated today in Urgent Care.   Reviewed [facility] Urgent Care hours.    Patient stated only available tomorrow afternoon.    Appt scheduled with KELSEY Tipton CNP, on 4/14/21.  Appt date, time and location confirmed with patient.    Patient verbalized understanding and in agreement with plan.    Patient stated she has sterile urine collection containers at home and will plan to bring in sample for tomorrow's appt.    SAEID LowN, RN  [facility]

## 2021-04-14 ENCOUNTER — OFFICE VISIT (OUTPATIENT)
Dept: FAMILY MEDICINE | Facility: CLINIC | Age: 66
End: 2021-04-14
Payer: MEDICARE

## 2021-04-14 VITALS
OXYGEN SATURATION: 100 % | DIASTOLIC BLOOD PRESSURE: 65 MMHG | RESPIRATION RATE: 14 BRPM | SYSTOLIC BLOOD PRESSURE: 115 MMHG | WEIGHT: 130 LBS | HEART RATE: 69 BPM | BODY MASS INDEX: 19.34 KG/M2 | TEMPERATURE: 98.9 F

## 2021-04-14 DIAGNOSIS — R30.0 DYSURIA: Primary | ICD-10-CM

## 2021-04-14 DIAGNOSIS — N10 ACUTE PYELONEPHRITIS: ICD-10-CM

## 2021-04-14 LAB
ALBUMIN UR-MCNC: NEGATIVE MG/DL
APPEARANCE UR: CLEAR
BACTERIA #/AREA URNS HPF: ABNORMAL /HPF
BILIRUB UR QL STRIP: NEGATIVE
COLOR UR AUTO: YELLOW
GLUCOSE UR STRIP-MCNC: NEGATIVE MG/DL
HGB UR QL STRIP: NEGATIVE
KETONES UR STRIP-MCNC: NEGATIVE MG/DL
LEUKOCYTE ESTERASE UR QL STRIP: NEGATIVE
NITRATE UR QL: NEGATIVE
NON-SQ EPI CELLS #/AREA URNS LPF: ABNORMAL /LPF
PH UR STRIP: 5 PH (ref 5–7)
RBC #/AREA URNS AUTO: ABNORMAL /HPF
SOURCE: ABNORMAL
SP GR UR STRIP: 1.01 (ref 1–1.03)
UROBILINOGEN UR STRIP-ACNC: 0.2 EU/DL (ref 0.2–1)
WBC #/AREA URNS AUTO: ABNORMAL /HPF

## 2021-04-14 PROCEDURE — 99203 OFFICE O/P NEW LOW 30 MIN: CPT | Performed by: NURSE PRACTITIONER

## 2021-04-14 PROCEDURE — 87086 URINE CULTURE/COLONY COUNT: CPT | Performed by: NURSE PRACTITIONER

## 2021-04-14 PROCEDURE — 81001 URINALYSIS AUTO W/SCOPE: CPT | Performed by: NURSE PRACTITIONER

## 2021-04-14 RX ORDER — CIPROFLOXACIN 500 MG/1
500 TABLET, FILM COATED ORAL 2 TIMES DAILY
Qty: 20 TABLET | Refills: 0 | Status: SHIPPED | OUTPATIENT
Start: 2021-04-14 | End: 2021-04-24

## 2021-04-14 NOTE — PATIENT INSTRUCTIONS
Patient Education     Discharge Instructions for Pyelonephritis  You have been told you have a kidney infection. This is called pyelonephritis. The infection can be serious. It can damage your kidneys and cause bacteria to enter your bloodstream. You were treated in the hospital. Once you return home, here s what you can do at home to aid in your recovery and prevent future infections.   Home care    Take all the medicine you were prescribed, even if you feel better. Not finishing the medicine can make the infection come back. It may also make a future infection harder to treat.    Unless told not to by your healthcare provider, drink 8 to 12 glasses of fluid every day. Clear fluids, such as water, are best. This may help flush the infection from your system.    Preventing future infection    Keep your genital area clean. Use mild soap. Rinse with water.    If you are a woman, always wipe the genital area from front to back.    Urinate frequently. Don't hold urine in your bladder for a long time.    Always urinate after having sex.    Practice safe sex. Protect yourself and your partner from sexually transmitted infections (STIs).    Follow-up care  Follow up with your healthcare provider, or as advised. And see your healthcare provider for regular lab tests as directed.   When to call your healthcare provider  Call your healthcare provider right away if you have any of the following:    Decreased urine output or trouble urinating    Severe pain in the lower back or flank    Fever of 100.4 F (38 C) or higher, or as directed by your healthcare provider    Shaking chills    Vomiting    Blood in your urine    Dark-colored or foul-smelling urine    Nausea or other problems that prevent you from taking your prescribed medicine    New or worsening symptoms  [employer] last reviewed this educational content on 4/1/2020 2000-2021 [employer]. All rights reserved. This information is not intended as a  substitute for professional medical care. Always follow your healthcare professional's instructions.

## 2021-04-14 NOTE — PROGRESS NOTES
Assessment & Plan     Dysuria  See below  - UA with Microscopic reflex to Culture  - Urine Culture Aerobic Bacterial    Acute pyelonephritis  UA does not look remarkable, but symptoms consistent for early pyelonephritis in patient with frequent UTIs and risk factor (MS).  Will send for culture.   Will treat with Cipro.  Discussed the use and indication of this medication as well as potential side effects (including tendon rupture).  If symptoms are not improving over the next 48 hours or if symptoms worsen, she will follow up.   - ciprofloxacin (CIPRO) 500 MG tablet; Take 1 tablet (500 mg) by mouth 2 times daily for 10 days      32 minutes spent on the date of the encounter doing chart review, patient visit and documentation        Tobacco Cessation:   reports that she has been smoking cigarettes. She has been smoking about 0.25 packs per day. She quit smokeless tobacco use about 2 years ago.          Return in about 2 days (around 4/16/2021), or if symptoms worsen or fail to improve.    Maru Tipton NP  [facility]    Subjective Marylee is a 65 year old who presents for the following health issues  accompanied by her spouse:    HPI     Genitourinary - Female  Onset/Duration: 1st or 2nd of April  Description:   Painful urination (Dysuria): YES - not as painful           Frequency: might be improving  Blood in urine (Hematuria): no  Delay in urine (Hesitency): unsure  Intensity: moderate -  Migrating pain to right hip/lower abdomen  Progression of Symptoms:  same  Accompanying Signs & Symptoms:  Fever/chills: no  Flank pain: no  Nausea and vomiting: YES just nausea  Vaginal symptoms: odor  Abdominal/Pelvic Pain: YES - lower abdomen, right flank  History:   History of frequent UTI s: YES  History of kidney stones: no  Sexually Active: YES  Possibility of pregnancy: No  Precipitating or alleviating factors: None  Therapies tried and outcome: Cephalexin prescribed on Saturday      She was initially prescribed Macrobid        Review of Systems         Objective    /65 (BP Location: Left arm, Patient Position: Sitting, Cuff Size: Adult Regular)   Pulse 69   Temp 98.9  F (37.2  C) (Tympanic)   Resp 14   Wt 59 kg (130 lb)   LMP  (LMP Unknown)   SpO2 100%   BMI 19.34 kg/m    Body mass index is 19.34 kg/m .  Physical Exam   GENERAL: healthy, alert and no distress  RESP: lungs clear to auscultation - no rales, rhonchi or wheezes  CV: regular rate and rhythm, normal S1 S2, no S3 or S4, no murmur, click or rub, no peripheral edema and peripheral pulses strong  ABDOMEN:  diffuse lower abdominal tenderness, R>L  MS: right CVA tenderness  PSYCH: mentation appears normal, affect normal/bright    Results for orders placed or performed in visit on 04/14/21 (from the past 24 hour(s))   UA with Microscopic reflex to Culture    Specimen: Midstream Urine   Result Value Ref Range    Color Urine Yellow     Appearance Urine Clear     Glucose Urine Negative NEG^Negative mg/dL    Bilirubin Urine Negative NEG^Negative    Ketones Urine Negative NEG^Negative mg/dL    Specific Gravity Urine 1.015 1.003 - 1.035    pH Urine 5.0 5.0 - 7.0 pH    Protein Albumin Urine Negative NEG^Negative mg/dL    Urobilinogen Urine 0.2 0.2 - 1.0 EU/dL    Nitrite Urine Negative NEG^Negative    Blood Urine Negative NEG^Negative    Leukocyte Esterase Urine Negative NEG^Negative    Source Midstream Urine     WBC Urine 0 - 5 OTO5^0 - 5 /HPF    RBC Urine O - 2 OTO2^O - 2 /HPF    Squamous Epithelial /LPF Urine Few FEW^Few /LPF    Bacteria Urine Few (A) NEG^Negative /HPF

## 2021-04-16 LAB
BACTERIA SPEC CULT: NO GROWTH
Lab: NORMAL
SPECIMEN SOURCE: NORMAL

## 2021-06-07 ENCOUNTER — TELEPHONE (OUTPATIENT)
Dept: FAMILY MEDICINE | Facility: CLINIC | Age: 66
End: 2021-06-07

## 2021-06-07 NOTE — TELEPHONE ENCOUNTER
Patient feeling pain in back. Pain sometimes goes around to front. Going on since mid April. Wants to see Maru Tipton or Shaylee Pulliam. No openings till end of month. Please advise. Ok to leave a detailed message.

## 2021-06-08 ENCOUNTER — TRANSFERRED RECORDS (OUTPATIENT)
Dept: HEALTH INFORMATION MANAGEMENT | Facility: CLINIC | Age: 66
End: 2021-06-08

## 2021-06-08 NOTE — TELEPHONE ENCOUNTER
Coronavirus (COVID-19) Notification    Patient  Marylee Woods    Reason for call  Notify of Positive Coronavirus (COVID-19) lab results, assess symptoms,  review [facility] recommendations    Lab Result    Lab test:  2019-nCoV rRt-PCR or SARS-CoV-2 PCR    Oropharyngeal AND/OR nasopharyngeal swabs is POSITIVE for 2019-nCoV RNA/SARS-COV-2 PCR (COVID-19 virus)    Instructions per [facility] Coronavirus COVID-19 recommendations    Writer shared the following:  Hi, My name is Sridevi a nurse and I am calling on behalf of [employer].  We were notified that your Coronavirus test (COVID-19) for was POSITIVE for the virus.  I have some information to relay to you but first I wanted to mention that the [place] Dept of [facility] will be contacting you shortly [it's possible MD already called Patient] to talk to you more about how you are feeling and other people you have had contact with who might now also have the virus.    Patient has cough at night and early am.  Barky sounding with occ productive.  States she has lung issues. Main question at this time is  What to do with both partners in same home do they need to stay apart.  Writer deferred to MD for these questions.     If your symptoms hare worsened, the Patient should contact 911 or have someone drive them to Emergency Dept promptly:      If Patient calling 911, inform 911 personal that you have tested positive for the Coronavirus (COVID-19).  Place mask on and await 911 to arrive.    If Emergency Dept, If possible, please have another adult drive you to the Emergency Dept but you need to wear mask when in contact with other people.      Reviewed  information with Patient    Since you tested POSITIVE for the COVID-19 virus, it is important that you protect others from being exposed and infected with this virus.    First, stay home and away from others (self-isolate) until:    You've had no fever--and no medicine that reduces fever--for 3 full days (72  hours). And      Your other symptoms have gotten better. For example, your cough or breathing has improved. And     At least 10 days have passed since your symptoms started.    During this time:    Stay in your own room (and use your own bathroom), if you can.    Stay away from others in your home. No hugging, kissing or shaking hands.    Don't let anyone visit.    Don't go to work, school or anywhere else.     Clean  high touch  surfaces often (doorknobs, counters, handles, etc.). Use a household cleaning spray or wipes.    Cover your mouth and nose with a mask, tissue or washcloth to avoid spreading germs.  Wash your hands and face often with soap and water.  You should not go back to work until you meet the guidelines above for ending your home isolation. You should meet these along with any other guidelines that your employer has. Letter sent per RN.  Employers: This document serves as formal notice of your employee's medical guidelines for going back to work. They must meet the above guidelines before going back to work in person.    How can I take care of myself?  1. Get lots of rest. Drink extra fluids (unless a doctor has told you not to).    2. Take Tylenol (acetaminophen) for fever or pain. If you have liver or kidney problems, ask your family doctor if it's okay to take Tylenol.     Take either:     650 mg (two 325 mg pills) every 4 to 6 hours, or     1,000 mg (two 500 mg pills) every 8 hours as needed.     Note: Don't take more than 3,000 mg in one day. Acetaminophen is found in many medicines (both prescribed and over-the-counter medicines). Read all labels to be sure you don't take too much.  3. If you have other health problems (like cancer, heart failure, an organ transplant or severe kidney disease): Call your specialty clinic if you don't feel better in the next 2 days.    4. Know when to call 911: If your breathing is so bad that it keeps you from doing normal activities, call 911 or go to the  emergency room. Tell them that you've been staying home and may have COVID-19.  5. Sign up for [employer]. We know it's scary to hear that you have COVID-19. We want to track your symptoms to make sure you're okay over the next 2 weeks. Please look for an email from [employer]--this is a free, online program that we'll use to keep in touch. To sign up, follow the link in the email. Learn more at [email or web address].    Where can I get more information?    To learn the [place]'s guidelines for staying home, please visit the [place] Department of Health website at [facility] .    To learn more about COVID-19 and how to care for yourself at home, please visit the CDC website at cdc.gov. For more options for care at [facility] ([facility]) COVID-19 Hotline:   [phone]

## 2021-06-08 NOTE — TELEPHONE ENCOUNTER
Coronavirus (COVID-19) Notification    Reason for call  Notify of POSITIVE  COVID-19 lab result, assess symptoms,  review [facility] recommendations    Lab Result   Lab test for 2019-nCoV rRt-PCR or SARS-COV-2 PCR  Oropharyngeal AND/OR nasopharyngeal swabs were POSITIVE for 2019-nCoV RNA [OR] SARS-COV-2 RNA (COVID-19) RNA     We have been unable to reach Patient by phone at this time to notify of their Positive COVID-19 result.  Left voicemail message requesting a call back between 8 am to 6:30 p.m. to [phone] [facility] for results.   (Weekends, this line is available from 10A to 6:30P)     POSITIVE COVID-19 Letter sent.    Maria Ines Ortez RN  Covid-19 Results Team  [phone]

## 2021-06-09 DIAGNOSIS — F33.1 MODERATE EPISODE OF RECURRENT MAJOR DEPRESSIVE DISORDER (H): ICD-10-CM

## 2021-06-09 NOTE — TELEPHONE ENCOUNTER
Feels sharp pain Low hip bone to gut on the rt side. Sometimes hurts from rt hip bone to under rt ribs  Doesn't hurt today  Has been going on since mid april  Eats but has no appetite  Denies fever, chills, sweats  Still has appendix  Appointment made for tomorrow.  Thanks!     Jazzy Hernandez RN

## 2021-06-10 ENCOUNTER — ANCILLARY PROCEDURE (OUTPATIENT)
Dept: GENERAL RADIOLOGY | Facility: CLINIC | Age: 66
End: 2021-06-10
Attending: FAMILY MEDICINE
Payer: MEDICARE

## 2021-06-10 ENCOUNTER — TELEPHONE (OUTPATIENT)
Dept: ENDOCRINOLOGY | Facility: CLINIC | Age: 66
End: 2021-06-10

## 2021-06-10 ENCOUNTER — OFFICE VISIT (OUTPATIENT)
Dept: FAMILY MEDICINE | Facility: CLINIC | Age: 66
End: 2021-06-10
Payer: MEDICARE

## 2021-06-10 VITALS
RESPIRATION RATE: 16 BRPM | OXYGEN SATURATION: 98 % | DIASTOLIC BLOOD PRESSURE: 65 MMHG | TEMPERATURE: 98.5 F | SYSTOLIC BLOOD PRESSURE: 110 MMHG | HEART RATE: 64 BPM

## 2021-06-10 DIAGNOSIS — R10.31 RLQ ABDOMINAL PAIN: Primary | ICD-10-CM

## 2021-06-10 DIAGNOSIS — E05.00 GRAVES' DISEASE: Primary | ICD-10-CM

## 2021-06-10 DIAGNOSIS — M54.50 LEFT-SIDED LOW BACK PAIN WITHOUT SCIATICA, UNSPECIFIED CHRONICITY: ICD-10-CM

## 2021-06-10 LAB
ERYTHROCYTE [DISTWIDTH] IN BLOOD BY AUTOMATED COUNT: 15.2 % (ref 10–15)
HCT VFR BLD AUTO: 35.2 % (ref 35–47)
HGB BLD-MCNC: 12.2 G/DL (ref 11.7–15.7)
MCH RBC QN AUTO: 31.9 PG (ref 26.5–33)
MCHC RBC AUTO-ENTMCNC: 34.7 G/DL (ref 31.5–36.5)
MCV RBC AUTO: 92 FL (ref 78–100)
PLATELET # BLD AUTO: 177 10E9/L (ref 150–450)
RBC # BLD AUTO: 3.82 10E12/L (ref 3.8–5.2)
WBC # BLD AUTO: 5.7 10E9/L (ref 4–11)

## 2021-06-10 PROCEDURE — 36415 COLL VENOUS BLD VENIPUNCTURE: CPT | Performed by: FAMILY MEDICINE

## 2021-06-10 PROCEDURE — 99215 OFFICE O/P EST HI 40 MIN: CPT | Performed by: FAMILY MEDICINE

## 2021-06-10 PROCEDURE — 74019 RADEX ABDOMEN 2 VIEWS: CPT | Performed by: RADIOLOGY

## 2021-06-10 PROCEDURE — 85027 COMPLETE CBC AUTOMATED: CPT | Performed by: FAMILY MEDICINE

## 2021-06-10 RX ORDER — DULOXETIN HYDROCHLORIDE 60 MG/1
CAPSULE, DELAYED RELEASE ORAL
Qty: 90 CAPSULE | Refills: 0 | Status: SHIPPED | OUTPATIENT
Start: 2021-06-10 | End: 2021-08-18

## 2021-06-10 RX ORDER — LEVOTHYROXINE SODIUM 25 UG/1
TABLET ORAL
Qty: 270 TABLET | Refills: 1 | Status: SHIPPED | OUTPATIENT
Start: 2021-06-10 | End: 2021-08-16

## 2021-06-10 RX ORDER — PROPRANOLOL HYDROCHLORIDE 10 MG/1
10 TABLET ORAL 2 TIMES DAILY
Qty: 270 TABLET | Refills: 3
Start: 2021-06-10 | End: 2021-08-16

## 2021-06-10 NOTE — LETTER
Patient:  Marylee Woods  :   1955  MRN:     [id number]        Ms.Marylee Woods  [address] [facility] [id number]        Maritza 10, 2021    Dear Marylee    As per our phone call today, your TSH was higher at 264. This is suggestive that your thyroid is no longer working and that you do need to be on thyroid hormone. This means 2 things:    #1 reduce your propranolol to 10 mg twice daily-potentially you might be able to be off the propranolol. Please continue to check your blood pressure and heart rate at least once per week.    #2 start levothyroxine. I will have you start at 25 mcg daily for 1 week, then increase to 50 mcg daily for 1 week, then increase to 75 mcg daily. We will have you stay at 75 mcg daily and then you will have your thyroid levels rechecked. If you have any issues with shortness of breath, or fast heart rate, please let us know.    If you have any questions, please feel free to contact my nurse at [phone] select option #3 for triage nurse..    Regards    Aisha Osullivan MD

## 2021-06-10 NOTE — TELEPHONE ENCOUNTER
Pt's primary endocrinologist is unavailable until 7/1/21    6/10/21 labs show TSH at 264    5/13/21 TSH at 11.2    Primary local provider Leyda Beach at [phone]    Pt had graves disease with I131 treatment in Feb 2021,     No known hx of CV disease      Pt weights about 59 kg - goal of levothyroxine around  mcg      Will have pt start at levothyroxine at 25 mcg daily for 1 weeks and then increase to 50 mcg daily for 1 week, and then increase to 75 mcg daily    --  Called pt - details above reviewed,     Pt reports remaining on propanolol at 10 mg TID - /65, pulse at 64,     --  Pt to reduce to propranolol to 10 mg twice daily for now-    Pt start at levothyroxine at 25 mcg daily for 1 weeks and then increase to 50 mcg daily for 1 week, and then increase to 75 mcg daily  -  Pt to do labs 2 months

## 2021-06-10 NOTE — PROGRESS NOTES
Assessment & Plan   Left-sided low back pain without sciatica, unspecified chronicity  RLQ abdominal pain  -Unclear if low back pain and RLQ abdominal pain are related. No urinary symptoms to suggest acute kidney stones. No history of fever, nausea, chills. Unlikely appendicitis as she has had symptoms for over two months and lack of other constitutional symptoms. Possibly constipation, arthritis of right hip, MSK.   -Benign exam today  - XR KUB - will call with results  - US Abdomen Complete   - CBC with platelets    44 minutes spent on the date of the encounter doing chart review, history and exam, documentation and further activities per the note    DO [facility]    Subjective Marylee is a 66 year old who presents for the following health issues  accompanied by her spouse:    HPI   Hx of MS. Uses wheelchair and walker at home. Here for right lower back and RLQ abdominal pain that has been on-going since April. States pain is there all the time, waxes and wanes, throbbing pain, intermittently stabbing. Worse in the morning when she is lying bed. Works with PT and has not found that pain is any worse with physical activity.  Endorses being constipated slightly worse recently-  taking miralax, fiber, laxative pills. Has not tried any other medications for pain. No recent history of trauma or falls.  Denies dysuria, hematuria, nausea, vomiting, decreased appetite, vaginal bleeding, diarrhea, weight loss.     Still has appendix, uterus and ovaries.   RLQ Pain   Onset/Duration: x 6 weeks   Description  Location: hip - right  Joint Swelling: no  Redness: no  Pain: YES  Warmth: no  Intensity:  moderate  Progression of Symptoms:  same  Accompanying signs and symptoms:   Fevers: no  Numbness/tingling/weakness: no  History  Trauma to the area: no  Recent illness:  no  Previous similar problem: no  Previous evaluation:  no  Precipitating or alleviating factors:  Aggravating  factors include: in the mornings pain is worse   Therapies tried and outcome: nothing    Review of Systems   CONSTITUTIONAL: NEGATIVE for fever, chills, change in weight  INTEGUMENTARY/SKIN: NEGATIVE for worrisome rashes, moles or lesions  RESP: NEGATIVE for significant cough or SOB  CV: NEGATIVE for chest pain, palpitations or peripheral edema  : NEGATIVE for frequency, dysuria, or hematuria  NEURO: NEGATIVE for weakness, dizziness or paresthesias  HEME: NEGATIVE for bleeding problems      Objective    /65 (BP Location: Right arm, Patient Position: Sitting, Cuff Size: Adult Regular)   Pulse 64   Temp 98.5  F (36.9  C) (Oral)   Resp 16   LMP  (LMP Unknown)   SpO2 98%   There is no height or weight on file to calculate BMI.  Physical Exam   GENERAL: healthy, alert and no distress  RESP: lungs clear to auscultation - no rales, rhonchi or wheezes  CV: regular rate and rhythm, normal S1 S2, no S3 or S4, no murmur, click or rub, no peripheral edema and peripheral pulses strong  ABDOMEN: soft, mild tenderness over RLQ, no hepatosplenomegaly, no masses and bowel sounds normal  MS: mild tenderness to palpation over left lower back  PSYCH: mentation appears normal, affect normal/bright

## 2021-06-17 ENCOUNTER — TELEPHONE (OUTPATIENT)
Dept: ENDOCRINOLOGY | Facility: CLINIC | Age: 66
End: 2021-06-17

## 2021-06-17 NOTE — TELEPHONE ENCOUNTER
----- Message from Aisha Osullivan MD sent at 6/10/2021  3:57 PM CDT -----  How is pt doing on the levothyroxine at 25 mcg daily?

## 2021-06-20 NOTE — LETTER
Letter by Maria Ines Ortez RN at      Author: Maria Ines Ortez RN Service: -- Author Type: --    Filed:  Encounter Date: 5/19/2020 Status: (Other)       5/19/2020        Marylee [address] [place] [id number]    This letter provides a written record that you were tested for COVID-19 on 5/18/20.     Your result was positive.     This means that we found the virus that causes COVID-19 in your sample.    How can I protect others?    For safety, its very important to follow these rules.    First, stay home and away from others (self-isolate) until:      Youve had no fever--and no medicine that reduces fever--for 3 full days (72 hours). And?     Your other symptoms have gotten better. For example, your cough or breathing has improved. And?    At least 10 days have passed since your symptoms started.    During this time:      Stay in your own room (and use your own bathroom), if you can.    Stay away from others in your home. No hugging, kissing or shaking hands.    Dont let anyone visit.    Dont go to work, school or anywhere else.     Clean high touch surfaces often (doorknobs, counters, handles, etc.). Use a household cleaning spray or wipes.    Cover your mouth and nose with a mask, tissue or washcloth to avoid spreading germs.    Wash your hands and face often with soap and water.    You should not go back to work until you meet the guidelines above for ending your home isolation. You should meet these along with any other guidelines that your employer has.    Employers: This document serves as formal notice of your employees medical guidelines for going back to work. They must meet the above guidelines before going back to work in person.    How can I take care of myself?    1. Get lots of rest. Drink extra fluids (unless a doctor has told you not to).    2. Take Tylenol (acetaminophen) for fever or pain. If you have liver or kidney problems, ask your family doctor if its okay to take Tylenol.     Take  either:     650 mg (two 325 mg pills) every 4 to 6 hours, or?    1,000 mg (two 500 mg pills) every 8 hours as needed.     Note: Dont take more than 3,000 mg in one day. Acetaminophen is found in many medicines (both prescribed and over-the-counter medicines). Read all labels to be sure you dont take too much.  For children, check the Tylenol bottle for the right dose. The dose is based on the catalino age or weight.    1. If you have other health problems (like cancer, heart failure, an organ transplant or severe kidney disease): Call your specialty clinic if you dont feel better in the next 2 days.    2. Know when to call 911: If your breathing is so bad that it keeps you from doing normal activities, call 911 or go to the emergency room. Tell them that youve been staying home and may have COVID-19.    3. Sign up for [employer]. We know its scary to hear that you have COVID-19. We want to track your symptoms to make sure youre okay over the next 2 weeks. Please look for an email from [employer]--this is a free, online program that well use to keep in touch. To sign up, follow the link in the email. Learn more at [email or web address].    4. Interested is participating in research? Visit the link below to view current clinical trials that apply to your situation:  [email or web address]    Where can I get more information?    To learn the [facility] guidelines for staying home, please visit the [place] Department of Health website at https://www.health.state.[place]/diseases/coronavirus/basics.html.    To learn more about COVID-19 and how to care for yourself at home, please visit the CDC website at https://www.cdc.gov/coronavirus/2019-ncov/about/steps-when-sick.html.    For more options for care at [facility], please visit our website at [email or web address]

## 2021-06-24 ENCOUNTER — MYC MEDICAL ADVICE (OUTPATIENT)
Dept: ENDOCRINOLOGY | Facility: CLINIC | Age: 66
End: 2021-06-24

## 2021-06-24 DIAGNOSIS — E05.00 GRAVES' DISEASE: Primary | ICD-10-CM

## 2021-06-28 NOTE — TELEPHONE ENCOUNTER
Pt called. Tomorrow starting 75 mcg levo daily. C/o feeling tired.   Advised to have the TFTs checked in 6 weeks, with RTC.

## 2021-06-30 ENCOUNTER — HOSPITAL ENCOUNTER (OUTPATIENT)
Facility: CLINIC | Age: 66
Setting detail: OBSERVATION
Discharge: HOME | End: 2021-07-02
Attending: EMERGENCY MEDICINE | Admitting: INTERNAL MEDICINE
Payer: MEDICARE

## 2021-06-30 ENCOUNTER — APPOINTMENT (OUTPATIENT)
Dept: CT IMAGING | Facility: CLINIC | Age: 66
End: 2021-06-30
Attending: EMERGENCY MEDICINE
Payer: MEDICARE

## 2021-06-30 ENCOUNTER — TELEPHONE (OUTPATIENT)
Dept: ENDOCRINOLOGY | Facility: CLINIC | Age: 66
End: 2021-06-30

## 2021-06-30 ENCOUNTER — APPOINTMENT (OUTPATIENT)
Dept: GENERAL RADIOLOGY | Facility: CLINIC | Age: 66
End: 2021-06-30
Attending: EMERGENCY MEDICINE
Payer: MEDICARE

## 2021-06-30 DIAGNOSIS — E05.00 GRAVES' DISEASE: Primary | ICD-10-CM

## 2021-06-30 DIAGNOSIS — Z87.81 HISTORY OF FRACTURE OF FIBULA: Primary | ICD-10-CM

## 2021-06-30 DIAGNOSIS — K59.00 CONSTIPATION, UNSPECIFIED CONSTIPATION TYPE: ICD-10-CM

## 2021-06-30 DIAGNOSIS — N30.00 ACUTE CYSTITIS WITHOUT HEMATURIA: ICD-10-CM

## 2021-06-30 DIAGNOSIS — E05.90 HYPERTHYROIDISM: ICD-10-CM

## 2021-06-30 DIAGNOSIS — Z11.52 ENCOUNTER FOR SCREENING LABORATORY TESTING FOR SEVERE ACUTE RESPIRATORY SYNDROME CORONAVIRUS 2 (SARS-COV-2): ICD-10-CM

## 2021-06-30 LAB
ALBUMIN SERPL-MCNC: 4 G/DL (ref 3.4–5)
ALBUMIN UR-MCNC: 300 MG/DL
ALP SERPL-CCNC: 72 U/L (ref 40–150)
ALT SERPL W P-5'-P-CCNC: 21 U/L (ref 0–50)
ANION GAP SERPL CALCULATED.3IONS-SCNC: 6 MMOL/L (ref 3–14)
APPEARANCE UR: ABNORMAL
AST SERPL W P-5'-P-CCNC: 26 U/L (ref 0–45)
BACTERIA #/AREA URNS HPF: ABNORMAL /HPF
BASOPHILS # BLD AUTO: 0 10E9/L (ref 0–0.2)
BASOPHILS NFR BLD AUTO: 0.5 %
BILIRUB SERPL-MCNC: 0.7 MG/DL (ref 0.2–1.3)
BILIRUB UR QL STRIP: NEGATIVE
BUN SERPL-MCNC: 13 MG/DL (ref 7–30)
CALCIUM SERPL-MCNC: 9.6 MG/DL (ref 8.5–10.1)
CHLORIDE SERPL-SCNC: 98 MMOL/L (ref 94–109)
CO2 SERPL-SCNC: 32 MMOL/L (ref 20–32)
COLOR UR AUTO: YELLOW
CREAT SERPL-MCNC: 0.97 MG/DL (ref 0.52–1.04)
DIFFERENTIAL METHOD BLD: ABNORMAL
EOSINOPHIL # BLD AUTO: 0.1 10E9/L (ref 0–0.7)
EOSINOPHIL NFR BLD AUTO: 1.4 %
ERYTHROCYTE [DISTWIDTH] IN BLOOD BY AUTOMATED COUNT: 15.2 % (ref 10–15)
GFR SERPL CREATININE-BSD FRML MDRD: 61 ML/MIN/{1.73_M2}
GLUCOSE SERPL-MCNC: 82 MG/DL (ref 70–99)
GLUCOSE UR STRIP-MCNC: NEGATIVE MG/DL
HCT VFR BLD AUTO: 43 % (ref 35–47)
HGB BLD-MCNC: 14.7 G/DL (ref 11.7–15.7)
HGB UR QL STRIP: ABNORMAL
IMM GRANULOCYTES # BLD: 0 10E9/L (ref 0–0.4)
IMM GRANULOCYTES NFR BLD: 0.4 %
KETONES UR STRIP-MCNC: NEGATIVE MG/DL
LEUKOCYTE ESTERASE UR QL STRIP: ABNORMAL
LIPASE SERPL-CCNC: 60 U/L (ref 73–393)
LYMPHOCYTES # BLD AUTO: 1.4 10E9/L (ref 0.8–5.3)
LYMPHOCYTES NFR BLD AUTO: 16.6 %
MAGNESIUM SERPL-MCNC: 1.9 MG/DL (ref 1.6–2.3)
MCH RBC QN AUTO: 32.7 PG (ref 26.5–33)
MCHC RBC AUTO-ENTMCNC: 34.2 G/DL (ref 31.5–36.5)
MCV RBC AUTO: 96 FL (ref 78–100)
MONOCYTES # BLD AUTO: 0.3 10E9/L (ref 0–1.3)
MONOCYTES NFR BLD AUTO: 3.6 %
NEUTROPHILS # BLD AUTO: 6.3 10E9/L (ref 1.6–8.3)
NEUTROPHILS NFR BLD AUTO: 77.5 %
NITRATE UR QL: NEGATIVE
NRBC # BLD AUTO: 0 10*3/UL
NRBC BLD AUTO-RTO: 0 /100
PH UR STRIP: 7 PH (ref 5–7)
PHOSPHATE SERPL-MCNC: 3.8 MG/DL (ref 2.5–4.5)
PLATELET # BLD AUTO: 194 10E9/L (ref 150–450)
POTASSIUM SERPL-SCNC: 2.9 MMOL/L (ref 3.4–5.3)
PROT SERPL-MCNC: 8.9 G/DL (ref 6.8–8.8)
RBC # BLD AUTO: 4.5 10E12/L (ref 3.8–5.2)
RBC #/AREA URNS AUTO: >182 /HPF (ref 0–2)
SODIUM SERPL-SCNC: 136 MMOL/L (ref 133–144)
SOURCE: ABNORMAL
SP GR UR STRIP: 1.01 (ref 1–1.03)
SQUAMOUS #/AREA URNS AUTO: 0 /HPF (ref 0–1)
T4 FREE SERPL-MCNC: 0.67 NG/DL (ref 0.76–1.46)
TSH SERPL DL<=0.005 MIU/L-ACNC: 191.68 MU/L (ref 0.4–4)
UROBILINOGEN UR STRIP-MCNC: NORMAL MG/DL (ref 0–2)
WBC # BLD AUTO: 8.1 10E9/L (ref 4–11)
WBC #/AREA URNS AUTO: >182 /HPF (ref 0–5)

## 2021-06-30 PROCEDURE — 250N000011 HC RX IP 250 OP 636: Performed by: EMERGENCY MEDICINE

## 2021-06-30 PROCEDURE — 96374 THER/PROPH/DIAG INJ IV PUSH: CPT | Mod: 59

## 2021-06-30 PROCEDURE — U0005 INFEC AGEN DETEC AMPLI PROBE: HCPCS | Performed by: EMERGENCY MEDICINE

## 2021-06-30 PROCEDURE — 84439 ASSAY OF FREE THYROXINE: CPT | Performed by: EMERGENCY MEDICINE

## 2021-06-30 PROCEDURE — 86140 C-REACTIVE PROTEIN: CPT | Performed by: EMERGENCY MEDICINE

## 2021-06-30 PROCEDURE — 74019 RADEX ABDOMEN 2 VIEWS: CPT | Mod: 26 | Performed by: RADIOLOGY

## 2021-06-30 PROCEDURE — U0003 INFECTIOUS AGENT DETECTION BY NUCLEIC ACID (DNA OR RNA); SEVERE ACUTE RESPIRATORY SYNDROME CORONAVIRUS 2 (SARS-COV-2) (CORONAVIRUS DISEASE [COVID-19]), AMPLIFIED PROBE TECHNIQUE, MAKING USE OF HIGH THROUGHPUT TECHNOLOGIES AS DESCRIBED BY CMS-2020-01-R: HCPCS | Performed by: EMERGENCY MEDICINE

## 2021-06-30 PROCEDURE — G1004 CDSM NDSC: HCPCS | Mod: GC | Performed by: RADIOLOGY

## 2021-06-30 PROCEDURE — 81001 URINALYSIS AUTO W/SCOPE: CPT | Performed by: EMERGENCY MEDICINE

## 2021-06-30 PROCEDURE — 83690 ASSAY OF LIPASE: CPT | Performed by: EMERGENCY MEDICINE

## 2021-06-30 PROCEDURE — C9803 HOPD COVID-19 SPEC COLLECT: HCPCS

## 2021-06-30 PROCEDURE — 258N000003 HC RX IP 258 OP 636: Performed by: EMERGENCY MEDICINE

## 2021-06-30 PROCEDURE — 74177 CT ABD & PELVIS W/CONTRAST: CPT | Mod: 26 | Performed by: RADIOLOGY

## 2021-06-30 PROCEDURE — 74019 RADEX ABDOMEN 2 VIEWS: CPT

## 2021-06-30 PROCEDURE — 250N000009 HC RX 250: Performed by: EMERGENCY MEDICINE

## 2021-06-30 PROCEDURE — 99285 EMERGENCY DEPT VISIT HI MDM: CPT | Mod: 25

## 2021-06-30 PROCEDURE — 83735 ASSAY OF MAGNESIUM: CPT | Performed by: EMERGENCY MEDICINE

## 2021-06-30 PROCEDURE — 80053 COMPREHEN METABOLIC PANEL: CPT | Performed by: EMERGENCY MEDICINE

## 2021-06-30 PROCEDURE — 250N000013 HC RX MED GY IP 250 OP 250 PS 637: Performed by: EMERGENCY MEDICINE

## 2021-06-30 PROCEDURE — 87088 URINE BACTERIA CULTURE: CPT | Performed by: PHYSICIAN ASSISTANT

## 2021-06-30 PROCEDURE — 999N000127 HC STATISTIC PERIPHERAL IV START W US GUIDANCE

## 2021-06-30 PROCEDURE — 85025 COMPLETE CBC W/AUTO DIFF WBC: CPT | Performed by: EMERGENCY MEDICINE

## 2021-06-30 PROCEDURE — 84100 ASSAY OF PHOSPHORUS: CPT | Performed by: EMERGENCY MEDICINE

## 2021-06-30 PROCEDURE — G0378 HOSPITAL OBSERVATION PER HR: HCPCS

## 2021-06-30 PROCEDURE — 87086 URINE CULTURE/COLONY COUNT: CPT | Performed by: PHYSICIAN ASSISTANT

## 2021-06-30 PROCEDURE — 99285 EMERGENCY DEPT VISIT HI MDM: CPT | Performed by: EMERGENCY MEDICINE

## 2021-06-30 PROCEDURE — 84443 ASSAY THYROID STIM HORMONE: CPT | Performed by: EMERGENCY MEDICINE

## 2021-06-30 PROCEDURE — 96361 HYDRATE IV INFUSION ADD-ON: CPT

## 2021-06-30 PROCEDURE — G1004 CDSM NDSC: HCPCS

## 2021-06-30 RX ORDER — ONDANSETRON 2 MG/ML
4 INJECTION INTRAMUSCULAR; INTRAVENOUS EVERY 30 MIN PRN
Status: DISCONTINUED | OUTPATIENT
Start: 2021-06-30 | End: 2021-07-02 | Stop reason: HOSPADM

## 2021-06-30 RX ORDER — HYDROMORPHONE HYDROCHLORIDE 1 MG/ML
0.5 INJECTION, SOLUTION INTRAMUSCULAR; INTRAVENOUS; SUBCUTANEOUS
Status: DISCONTINUED | OUTPATIENT
Start: 2021-06-30 | End: 2021-07-02 | Stop reason: HOSPADM

## 2021-06-30 RX ORDER — IOPAMIDOL 755 MG/ML
80 INJECTION, SOLUTION INTRAVASCULAR ONCE
Status: COMPLETED | OUTPATIENT
Start: 2021-06-30 | End: 2021-06-30

## 2021-06-30 RX ORDER — POTASSIUM CHLORIDE 20MEQ/15ML
40 LIQUID (ML) ORAL ONCE
Status: COMPLETED | OUTPATIENT
Start: 2021-06-30 | End: 2021-06-30

## 2021-06-30 RX ORDER — CEFPODOXIME PROXETIL 100 MG/1
100 TABLET, FILM COATED ORAL 2 TIMES DAILY
Status: DISCONTINUED | OUTPATIENT
Start: 2021-06-30 | End: 2021-07-02 | Stop reason: HOSPADM

## 2021-06-30 RX ORDER — POTASSIUM CHLORIDE 7.45 MG/ML
10 INJECTION INTRAVENOUS ONCE
Status: COMPLETED | OUTPATIENT
Start: 2021-06-30 | End: 2021-06-30

## 2021-06-30 RX ADMIN — POTASSIUM CHLORIDE 10 MEQ: 7.46 INJECTION, SOLUTION INTRAVENOUS at 19:45

## 2021-06-30 RX ADMIN — IOPAMIDOL 80 ML: 755 INJECTION, SOLUTION INTRAVENOUS at 19:13

## 2021-06-30 RX ADMIN — ONDANSETRON 4 MG: 2 INJECTION INTRAMUSCULAR; INTRAVENOUS at 18:13

## 2021-06-30 RX ADMIN — SODIUM CHLORIDE, PRESERVATIVE FREE 60 ML: 5 INJECTION INTRAVENOUS at 19:14

## 2021-06-30 RX ADMIN — CEFPODOXIME PROXETIL 100 MG: 100 TABLET, FILM COATED ORAL at 23:05

## 2021-06-30 RX ADMIN — POTASSIUM CHLORIDE 40 MEQ: 20 SOLUTION ORAL at 21:00

## 2021-06-30 RX ADMIN — SODIUM CHLORIDE 1000 ML: 9 INJECTION, SOLUTION INTRAVENOUS at 18:11

## 2021-06-30 RX ADMIN — DOCUSATE SODIUM 286 ML: 50 LIQUID ORAL at 20:05

## 2021-06-30 ASSESSMENT — ENCOUNTER SYMPTOMS
BLOOD IN STOOL: 0
FEVER: 0
SHORTNESS OF BREATH: 0
DIARRHEA: 0
WEAKNESS: 1
DYSURIA: 1
VOMITING: 0
ABDOMINAL DISTENTION: 0
CONSTIPATION: 1
NAUSEA: 1
ABDOMINAL PAIN: 1
APPETITE CHANGE: 1

## 2021-06-30 ASSESSMENT — MIFFLIN-ST. JEOR: SCORE: 1178.18

## 2021-06-30 NOTE — TELEPHONE ENCOUNTER
I reached Marylee today who just started the Levothyroxine TID yesterday. She is sick with constipation. Abdominal pain , nausea and in tears. Her last xray 6/10/21 showed large amount of stool. She tells me her bowel movements are few with really hard stool. I instructed her to go to the ER for bowel obstruction screening and help with pain management .Her  was going to buy her an enema but she is so miserable  She should be seen . Rochelle Ayala RN on 6/30/2021 at 9:51 AM

## 2021-06-30 NOTE — ED TRIAGE NOTES
C/o severe constipation, LBM 3 weeks ago  Not taking her laxatives, stool softeners, or fiber supplements due to not being able to get to the toilet without assistance due to MS  States she is taking her synthroid and other meds

## 2021-06-30 NOTE — ED PROVIDER NOTES
"    [facility] EMERGENCY DEPARTMENT ([facility])  6/30/21     History     Chief Complaint   Patient presents with     Constipation     The history is provided by the patient and medical records.     Marylee Woods is a 66 year old female with a past medical history significant for GERD, Grave's disease, CKD stage 3, and multiple sclerosis who presents here to the Emergency Department due to abdominal pain and constipation.  Patient reports she has had difficulty with constipation for her whole life. She currently denies being on any bowel regimen other than using miralax and other laxatives as needed.  She reports that the MiraLAX is usually successful when she takes this as needed, but reports that it has not been very beneficial for her as of late.  Patient reports she was also initiated on Fosamax over the past 1 month, and states that this has not been working either.  She reports she has been using that every few days.  Patient reports her last real bowel movement was roughly 20 days ago.  She states she has been able to pass very small \"pebble-like\" stools earlier today.  Patient also reports that she has been passing gas over the past 1 month, but states that she has not had flatulence today.  Patient reports the majority of her abdominal pain is in the right lower quadrant.  She states that she has not been eating very much over the last few days, and nothing today.  She reports she has been increasingly nauseous, but has not vomited.  Patient denies history of appendectomy, but does endorse history of cholecystectomy.  She has not had a fever, cough, or other URI symptoms.  Patient reports she has urinary incontinence and states that because of this she has intermittent dysuria.  She also reports that she has frequent UTIs.  Patient reports that she has not had any new symptoms regarding her MS.  She feels as if she is doing better with PT.    Per review of patient's chart, patient was seen in clinic " on 06/10/2021 with right lower quadrant abdominal pain.  XR KUB showed large amount of stool with constipation.  She was started on Levothyroxine on 06/10/2021 and the dose was increased to three times daily yesterday (75 mcg daily).           Past Medical History  Past Medical History:   Diagnosis Date     Gastro-oesophageal reflux disease      Multiple sclerosis (H)      Past Surgical History:   Procedure Laterality Date     C/SECTION, LOW TRANSVERSE  1992     COLONOSCOPY  2007     COLONOSCOPY N/A 1/26/2017    Procedure: COMBINED COLONOSCOPY, SINGLE OR MULTIPLE BIOPSY/POLYPECTOMY BY BIOPSY;  Surgeon: Mayo Adkins MD, MD;  Location:  GI     ESOPHAGOSCOPY, GASTROSCOPY, DUODENOSCOPY (EGD), COMBINED  1/26/2017    Dr. Adkins [facility]     ESOPHAGOSCOPY, GASTROSCOPY, DUODENOSCOPY (EGD), COMBINED N/A 1/26/2017    Procedure: COMBINED ESOPHAGOSCOPY, GASTROSCOPY, DUODENOSCOPY (EGD), BIOPSY SINGLE OR MULTIPLE;  Surgeon: Mayo Adkins MD, MD;  Location:  GI     HIP SURGERY  2009    femur ortho surgery     LAPAROSCOPIC CHOLECYSTECTOMY  6/24/2014    Procedure: LAPAROSCOPIC CHOLECYSTECTOMY;  Surgeon: Randy Bailey MD;  Location: [facility]     OPEN REDUCTION INTERNAL FIXATION FEMUR DISTAL Left 11/1/2018    Procedure: OPEN REDUCTION INTERNAL FIXATION LEFT FEMUR DISTAL;  Surgeon: Jose Valadez MD;  Location: [facility] OR     OPEN REDUCTION INTERNAL FIXATION RODDING INTRAMEDULLARY TIBIA  4/14/2013    Procedure: OPEN REDUCTION INTERNAL FIXATION RODDING INTRAMEDULLARY TIBIA;;  Surgeon: Sajan Cast MD;  Location: [facility] OR     ORTHOPEDIC SURGERY  2009    surgery right upper femur fx near hip     acetaminophen (TYLENOL) 500 MG tablet  alendronate (FOSAMAX) 70 MG tablet  baclofen (LIORESAL) 10 MG tablet  cholecalciferol (VITAMIN D3) 5000 units CAPS capsule  DULoxetine (CYMBALTA) 60 MG capsule  famotidine (PEPCID) 40 MG tablet  ferrous sulfate (IRON) 325 (65 Fe) MG tablet  gabapentin (NEURONTIN) 300 MG  capsule  hydrochlorothiazide (HYDRODIURIL) 12.5 MG tablet  latanoprost (XALATAN) 0.005 % ophthalmic solution  levothyroxine (SYNTHROID/LEVOTHROID) 25 MCG tablet  multivitamin, therapeutic with minerals (THERA-VIT-M) TABS  NICOTROL 10 MG inhaler  oxybutynin (DITROPAN-XL) 10 MG 24 hr tablet  propranolol (INDERAL) 10 MG tablet  valACYclovir (VALTREX) 500 MG tablet      Allergies   Allergen Reactions     Amantadine      hallucinations     Budesonide      Symbicort Rash     Family History  Family History   Problem Relation Age of Onset     Heart Disease Maternal Grandmother      Cancer Maternal Grandfather      Heart Disease Paternal Grandfather      Heart Disease Mother      Heart Disease Father      Diabetes No family hx of      Coronary Artery Disease No family hx of      Hypertension No family hx of      Hyperlipidemia No family hx of      Cerebrovascular Disease No family hx of      Breast Cancer No family hx of      Colon Cancer No family hx of      Prostate Cancer No family hx of      Other Cancer No family hx of      Depression No family hx of      Anxiety Disorder No family hx of      Mental Illness No family hx of      Substance Abuse No family hx of      Anesthesia Reaction No family hx of      Asthma No family hx of      Osteoporosis No family hx of      Genetic Disorder No family hx of      Thyroid Disease No family hx of      Obesity No family hx of      Unknown/Adopted No family hx of      Social History   Social History     Tobacco Use     Smoking status: Current Some Day Smoker     Packs/day: 0.25     Types: Cigarettes     Smokeless tobacco: Former User     Quit date: 10/31/2018   Substance Use Topics     Alcohol use: Yes     Alcohol/week: 0.0 standard drinks     Comment: 1/wk     Drug use: No      Past medical history, past surgical history, medications, allergies, family history, and social history were reviewed with the patient. No additional pertinent items.       Review of Systems   Constitutional:  "Positive for appetite change. Negative for fever.   Respiratory: Negative for shortness of breath.    Cardiovascular: Negative for chest pain.   Gastrointestinal: Positive for abdominal pain, constipation and nausea. Negative for abdominal distention, blood in stool, diarrhea and vomiting.   Genitourinary: Positive for dysuria.   Neurological: Positive for weakness.   All other systems reviewed and are negative.      Physical Exam   BP: (!) 148/111  Pulse: 96  Temp: 98.7  F (37.1  C)  Resp: 18  Height: 172.7 cm (5' 8\")  Weight: 59 kg (130 lb)  SpO2: 99 %  Physical Exam  Vitals signs and nursing note reviewed.   Constitutional:       General: She is not in acute distress.     Appearance: Normal appearance. She is well-developed and normal weight. She is not ill-appearing or diaphoretic.   HENT:      Head: Normocephalic and atraumatic.      Nose: Nose normal.      Mouth/Throat:      Mouth: Mucous membranes are dry.   Eyes:      General: No scleral icterus.     Conjunctiva/sclera: Conjunctivae normal.   Neck:      Musculoskeletal: Normal range of motion and neck supple. No neck rigidity.   Cardiovascular:      Rate and Rhythm: Normal rate.   Pulmonary:      Effort: Pulmonary effort is normal. No respiratory distress.      Breath sounds: No stridor.   Abdominal:      General: There is no distension.      Palpations: Abdomen is soft. There is no mass.      Tenderness: There is generalized abdominal tenderness and tenderness in the right lower quadrant. There is guarding and rebound. Positive signs include McBurney's sign. Negative signs include Craig's sign and Rovsing's sign.      Hernia: No hernia is present.   Musculoskeletal: Normal range of motion.         General: No deformity or signs of injury.   Skin:     General: Skin is warm and dry.      Coloration: Skin is not jaundiced or pale.      Findings: No rash.   Neurological:      Mental Status: She is alert and oriented to person, place, and time. Mental status is " at baseline.      Motor: Weakness present.   Psychiatric:         Mood and Affect: Mood normal.         Behavior: Behavior normal.         Thought Content: Thought content normal.         ED Course   5:21 PM  The patient was seen and examined by Cheyanne Huynh MD in Room ED21.     Procedures               Results for orders placed or performed during the hospital encounter of 06/30/21   Abdomen XR, 2 vw, flat and upright     Status: None    Narrative    EXAM: XR ABDOMEN 2VIEWS  6/30/2021 3:30 PM      HISTORY: abdominal pain, constipation    COMPARISON: Abdominal radiograph 6/10/2021    FINDINGS: Supine and upright abdominal radiographs. Cholecystectomy  clips. Large amount of stool, predominantly within the distal  descending and rectosigmoid colon. Gaseous distention of the colon at  the splenic flexure. No pneumatosis. No portal venous gas. Visualized  portions of the lung demonstrate no focal airspace opacities. Surgical  changes in the right hip.      Impression    IMPRESSION: Large colonic stool burden, predominantly involving the  distal colon. Gaseous distention of the splenic flexure.         I have personally reviewed the examination and initial interpretation  and I agree with the findings.    DORA VILLA MD          SYSTEM ID:  [id number]   CT Abdomen Pelvis w Contrast     Status: None    Narrative    EXAMINATION: CT ABDOMEN PELVIS W CONTRAST, 6/30/2021 7:22 PM    TECHNIQUE:  Helical CT images from the lung bases through the  symphysis pubis were obtained with IV contrast. Contrast dose: 80 mL    COMPARISON: CT abdomen and pelvis 6/9/2014    HISTORY: RLQ abdominal pain, appendicitis suspected (Age >= 14y);  Bowel obstruction suspected    FINDINGS:    Liver: Normal parenchymal attenuation without focal mass. Minor focal  fatty change along the falciform ligament.  Biliary system: Postoperative changes of cholecystectomy. Mild  prominence of the common duct and central intrahepatic ducts  consistent with  same. Duct tapers to the ampulla..  Pancreas: Atrophic appearance of the pancreas.  Stomach: Small sliding-type hiatal hernia.  Spleen: Within normal limits. Splenules in the hilum.  Adrenal glands: Within normal limits.  Kidneys: 2 subcentimeter right-sided simple appearing renal cysts.  Bilateral pelviectasis.  Bladder: Slight diffuse hyperenhancement of the bladder wall.  Reproductive organs: Within normal limits. Small amount of fluid  suspected in the vagina, possibly related to suspected urinary  incontinence.  Colon: Large colonic stool burden. There is some presacral/perirectal  stranding and fluid.  Appendix: Within normal limits as seen on coronal series 3 image 28.  Small Bowel: Within normal limits.  Lymph nodes: No intra-abdominal or pelvic lymphadenopathy.  Vasculature: Mild aortic atherosclerosis. No significant aneurysm or  stenosis.  Peritoneum: No intraabdominal free air. Trace ascites.    Lung bases: Minimal amount of bibasilar atelectasis.    Bones and soft tissues: No suspicious soft tissue mass or fluid  collection. 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 Postsurgical changes of right intertrochanteric  hip fixation, hardware appears intact. Transitional lumbosacral  vertebrae with partial lumbarization of S1. No suspicious osseous  lesion.      Impression    IMPRESSION:   1. Large colonic stool burden. Small amount of ascites demonstrated  with some presacral/perirectal edema and fluid. The adjacent wall of  the rectum does not appear significantly thickened but this could be  correlated clinically with symptoms for stercoral colitis given rectal  stool burden.  2. No evidence of appendicitis or bowel obstruction as questioned.  3. Enhancement of the bladder wall which could indicate cystitis  suggest correlation with urinalysis.      I have personally reviewed the examination and initial  interpretation  and I agree with the findings.    RICHARD MYERS MD          SYSTEM ID:  [id number]   CBC with platelets differential     Status: Abnormal   Result Value Ref Range    WBC 8.1 4.0 - 11.0 10e9/L    RBC Count 4.50 3.8 - 5.2 10e12/L    Hemoglobin 14.7 11.7 - 15.7 g/dL    Hematocrit 43.0 35.0 - 47.0 %    MCV 96 78 - 100 fl    MCH 32.7 26.5 - 33.0 pg    MCHC 34.2 31.5 - 36.5 g/dL    RDW 15.2 (H) 10.0 - 15.0 %    Platelet Count 194 150 - 450 10e9/L    Diff Method Automated Method     % Neutrophils 77.5 %    % Lymphocytes 16.6 %    % Monocytes 3.6 %    % Eosinophils 1.4 %    % Basophils 0.5 %    % Immature Granulocytes 0.4 %    Nucleated RBCs 0 0 /100    Absolute Neutrophil 6.3 1.6 - 8.3 10e9/L    Absolute Lymphocytes 1.4 0.8 - 5.3 10e9/L    Absolute Monocytes 0.3 0.0 - 1.3 10e9/L    Absolute Eosinophils 0.1 0.0 - 0.7 10e9/L    Absolute Basophils 0.0 0.0 - 0.2 10e9/L    Abs Immature Granulocytes 0.0 0 - 0.4 10e9/L    Absolute Nucleated RBC 0.0    Comprehensive metabolic panel     Status: Abnormal   Result Value Ref Range    Sodium 136 133 - 144 mmol/L    Potassium 2.9 (L) 3.4 - 5.3 mmol/L    Chloride 98 94 - 109 mmol/L    Carbon Dioxide 32 20 - 32 mmol/L    Anion Gap 6 3 - 14 mmol/L    Glucose 82 70 - 99 mg/dL    Urea Nitrogen 13 7 - 30 mg/dL    Creatinine 0.97 0.52 - 1.04 mg/dL    GFR Estimate 61 >60 mL/min/[1.73_m2]    GFR Estimate If Black 71 >60 mL/min/[1.73_m2]    Calcium 9.6 8.5 - 10.1 mg/dL    Bilirubin Total 0.7 0.2 - 1.3 mg/dL    Albumin 4.0 3.4 - 5.0 g/dL    Protein Total 8.9 (H) 6.8 - 8.8 g/dL    Alkaline Phosphatase 72 40 - 150 U/L    ALT 21 0 - 50 U/L    AST 26 0 - 45 U/L   UA reflex to Microscopic and Culture     Status: Abnormal    Specimen: Urine catheter; Catheterized Urine   Result Value Ref Range    Color Urine Yellow     Appearance Urine Slightly Cloudy     Glucose Urine Negative NEG^Negative mg/dL    Bilirubin Urine Negative NEG^Negative    Ketones Urine Negative NEG^Negative mg/dL     Specific Gravity Urine 1.010 1.003 - 1.035    Blood Urine Large (A) NEG^Negative    pH Urine 7.0 5.0 - 7.0 pH    Protein Albumin Urine 300 (A) NEG^Negative mg/dL    Urobilinogen mg/dL Normal 0.0 - 2.0 mg/dL    Nitrite Urine Negative NEG^Negative    Leukocyte Esterase Urine Large (A) NEG^Negative    Source Catheterized Urine     RBC Urine >182 (H) 0 - 2 /HPF    WBC Urine >182 (H) 0 - 5 /HPF    Bacteria Urine Few (A) NEG^Negative /HPF    Squamous Epithelial /HPF Urine 0 0 - 1 /HPF   TSH with free T4 reflex     Status: Abnormal   Result Value Ref Range    .68 (H) 0.40 - 4.00 mU/L   Lipase     Status: Abnormal   Result Value Ref Range    Lipase 60 (L) 73 - 393 U/L   T4 free     Status: Abnormal   Result Value Ref Range    T4 Free 0.67 (L) 0.76 - 1.46 ng/dL   Magnesium     Status: None   Result Value Ref Range    Magnesium 1.9 1.6 - 2.3 mg/dL   Phosphorus     Status: None   Result Value Ref Range    Phosphorus 3.8 2.5 - 4.5 mg/dL     Medications   ondansetron (ZOFRAN) injection 4 mg (4 mg Intravenous Given 6/30/21 1813)   HYDROmorphone (PF) (DILAUDID) injection 0.5 mg (0.5 mg Intravenous Not Given 6/30/21 1849)   cefpodoxime (VANTIN) tablet 100 mg (has no administration in time range)   pink lady enema (COMPOUNDED: docusate, magnesium citrate, mineral oil, sodium phosphate) (286 mLs Rectal Given 6/30/21 2005)   0.9% sodium chloride BOLUS (0 mLs Intravenous Stopped 6/30/21 1948)   CT scan flush (60 mLs Intravenous Given 6/30/21 1914)   iopamidol (ISOVUE-370) solution 80 mL (80 mLs Intravenous Given 6/30/21 1913)   potassium chloride 10 mEq in 100 mL sterile water intermittent infusion (premix) (0 mEq Intravenous Stopped 6/30/21 2133)   potassium chloride (KAYCIEL) solution 40 mEq (40 mEq Oral Given 6/30/21 2100)       XR KUB 06/10/2021  IMPRESSION: Large amount of stool throughout the colon consistent with  constipation. Bowel gas pattern is nonobstructed. Postop changes right  hip. No abnormal mass or  calcification. Cholecystectomy clips.  Assessments & Plan (with Medical Decision Making)   Marylee Woods is a 66 year old female with a past medical history significant for GERD, Grave's disease, CKD stage 3, and multiple sclerosis who presents here to the Emergency Department due to abdominal pain and constipation.    Ddx: appendicitis, diverticulitis, constipation, obstruction, colitis    Patient tender over RLQ with acute RLQ pain in setting of 2 weeks of constipation and generalized ap. No fever. CT w/o appendicitis or obstruction. Large colonic stool burden and trace ascites. Bladder wall enhanced. R/o cystitis. Awaiting UA. Labs w/ nl hgb, wbc. Potassium 2.9. Poor po intake. Given IV and po potassium replacement. Total 50 meq. Given IVF, dilaudid, zofran, pink lady enema. TSH elevated and T4 improved but low. Patient actively being treated for hypothyroidism with ongoing med adjustments.     UA c/w infection. Reviewed prior cultures. H/o e coli. With resistence to ampicillin. Will treat with cefpodoxime. No e/o pyelo.     Patient without significant stool output after enema. I do not think patient has stercoral colitis as she does not have significant rectal pain. No e/o sepsis. Will admit obs for ongoing rx. Admited to ED obs.       I have reviewed the nursing notes. I have reviewed the findings, diagnosis, plan and need for follow up with the patient.    New Prescriptions    No medications on file       Final diagnoses:   Constipation, unspecified constipation type   Acute cystitis without hematuria     I, David Og, am serving as a trained medical scribe to document services personally performed by Cheyanne Huynh MD, based on the provider's statements to me.      Cheyanne COLON MD, was physically present and have reviewed and verified the accuracy of this note documented by David Og.      --    [facility] EMERGENCY DEPARTMENT  6/30/2021     Cheyanne Hunyh MD  06/30/21  [id number]

## 2021-06-30 NOTE — TELEPHONE ENCOUNTER
----- Message from Aisha Osullivan MD sent at 6/10/2021  3:58 PM CDT -----  How is pt doing levothyrroxine at 75 mcg daily?

## 2021-07-01 ENCOUNTER — APPOINTMENT (OUTPATIENT)
Dept: GENERAL RADIOLOGY | Facility: CLINIC | Age: 66
End: 2021-07-01
Payer: MEDICARE

## 2021-07-01 LAB
ALBUMIN SERPL-MCNC: 3 G/DL (ref 3.4–5)
ALP SERPL-CCNC: 58 U/L (ref 40–150)
ALT SERPL W P-5'-P-CCNC: 16 U/L (ref 0–50)
ANION GAP SERPL CALCULATED.3IONS-SCNC: 7 MMOL/L (ref 3–14)
AST SERPL W P-5'-P-CCNC: 33 U/L (ref 0–45)
BASOPHILS # BLD AUTO: 0 10E9/L (ref 0–0.2)
BASOPHILS NFR BLD AUTO: 0.4 %
BILIRUB SERPL-MCNC: 0.8 MG/DL (ref 0.2–1.3)
BUN SERPL-MCNC: 12 MG/DL (ref 7–30)
CALCIUM SERPL-MCNC: 8.3 MG/DL (ref 8.5–10.1)
CHLORIDE SERPL-SCNC: 109 MMOL/L (ref 94–109)
CO2 SERPL-SCNC: 23 MMOL/L (ref 20–32)
CREAT SERPL-MCNC: 0.88 MG/DL (ref 0.52–1.04)
CRP SERPL-MCNC: <2.9 MG/L (ref 0–8)
DIFFERENTIAL METHOD BLD: ABNORMAL
EOSINOPHIL # BLD AUTO: 0.2 10E9/L (ref 0–0.7)
EOSINOPHIL NFR BLD AUTO: 1.4 %
ERYTHROCYTE [DISTWIDTH] IN BLOOD BY AUTOMATED COUNT: 15.4 % (ref 10–15)
ERYTHROCYTE [SEDIMENTATION RATE] IN BLOOD BY WESTERGREN METHOD: 17 MM/H (ref 0–30)
GFR SERPL CREATININE-BSD FRML MDRD: 69 ML/MIN/{1.73_M2}
GLUCOSE SERPL-MCNC: 78 MG/DL (ref 70–99)
HCT VFR BLD AUTO: 34.1 % (ref 35–47)
HGB BLD-MCNC: 12.2 G/DL (ref 11.7–15.7)
IMM GRANULOCYTES # BLD: 0.1 10E9/L (ref 0–0.4)
IMM GRANULOCYTES NFR BLD: 0.8 %
LABORATORY COMMENT REPORT: ABNORMAL
LYMPHOCYTES # BLD AUTO: 1 10E9/L (ref 0.8–5.3)
LYMPHOCYTES NFR BLD AUTO: 9.2 %
MAGNESIUM SERPL-MCNC: 1.8 MG/DL (ref 1.6–2.3)
MCH RBC QN AUTO: 33.1 PG (ref 26.5–33)
MCHC RBC AUTO-ENTMCNC: 35.8 G/DL (ref 31.5–36.5)
MCV RBC AUTO: 92 FL (ref 78–100)
MONOCYTES # BLD AUTO: 0.4 10E9/L (ref 0–1.3)
MONOCYTES NFR BLD AUTO: 3.4 %
NEUTROPHILS # BLD AUTO: 8.8 10E9/L (ref 1.6–8.3)
NEUTROPHILS NFR BLD AUTO: 84.8 %
NRBC # BLD AUTO: 0 10*3/UL
NRBC BLD AUTO-RTO: 0 /100
PLATELET # BLD AUTO: 149 10E9/L (ref 150–450)
POTASSIUM SERPL-SCNC: 4.4 MMOL/L (ref 3.4–5.3)
PROT SERPL-MCNC: 6.8 G/DL (ref 6.8–8.8)
RBC # BLD AUTO: 3.69 10E12/L (ref 3.8–5.2)
SARS-COV-2 RNA RESP QL NAA+PROBE: POSITIVE
SODIUM SERPL-SCNC: 139 MMOL/L (ref 133–144)
SPECIMEN SOURCE: ABNORMAL
WBC # BLD AUTO: 10.4 10E9/L (ref 4–11)

## 2021-07-01 PROCEDURE — G0378 HOSPITAL OBSERVATION PER HR: HCPCS

## 2021-07-01 PROCEDURE — 36415 COLL VENOUS BLD VENIPUNCTURE: CPT | Performed by: INTERNAL MEDICINE

## 2021-07-01 PROCEDURE — 85652 RBC SED RATE AUTOMATED: CPT | Performed by: PHYSICIAN ASSISTANT

## 2021-07-01 PROCEDURE — 80053 COMPREHEN METABOLIC PANEL: CPT | Performed by: INTERNAL MEDICINE

## 2021-07-01 PROCEDURE — 83735 ASSAY OF MAGNESIUM: CPT | Performed by: PHYSICIAN ASSISTANT

## 2021-07-01 PROCEDURE — 85025 COMPLETE CBC W/AUTO DIFF WBC: CPT | Performed by: PHYSICIAN ASSISTANT

## 2021-07-01 PROCEDURE — 83735 ASSAY OF MAGNESIUM: CPT | Performed by: INTERNAL MEDICINE

## 2021-07-01 PROCEDURE — 250N000013 HC RX MED GY IP 250 OP 250 PS 637: Performed by: NURSE PRACTITIONER

## 2021-07-01 PROCEDURE — 71045 X-RAY EXAM CHEST 1 VIEW: CPT

## 2021-07-01 PROCEDURE — 250N000013 HC RX MED GY IP 250 OP 250 PS 637: Performed by: EMERGENCY MEDICINE

## 2021-07-01 PROCEDURE — 250N000013 HC RX MED GY IP 250 OP 250 PS 637: Performed by: PHYSICIAN ASSISTANT

## 2021-07-01 PROCEDURE — 71045 X-RAY EXAM CHEST 1 VIEW: CPT | Mod: 26 | Performed by: RADIOLOGY

## 2021-07-01 PROCEDURE — 36415 COLL VENOUS BLD VENIPUNCTURE: CPT | Performed by: PHYSICIAN ASSISTANT

## 2021-07-01 RX ORDER — LEVOTHYROXINE SODIUM 75 UG/1
75 TABLET ORAL
Status: DISCONTINUED | OUTPATIENT
Start: 2021-07-01 | End: 2021-07-02 | Stop reason: HOSPADM

## 2021-07-01 RX ORDER — HYDROCHLOROTHIAZIDE 12.5 MG/1
12.5 TABLET ORAL DAILY
Status: DISCONTINUED | OUTPATIENT
Start: 2021-07-01 | End: 2021-07-02 | Stop reason: HOSPADM

## 2021-07-01 RX ORDER — FERROUS SULFATE 325(65) MG
325 TABLET ORAL DAILY
Status: DISCONTINUED | OUTPATIENT
Start: 2021-07-01 | End: 2021-07-02 | Stop reason: HOSPADM

## 2021-07-01 RX ORDER — ONDANSETRON 2 MG/ML
4 INJECTION INTRAMUSCULAR; INTRAVENOUS EVERY 6 HOURS PRN
Status: DISCONTINUED | OUTPATIENT
Start: 2021-07-01 | End: 2021-07-02 | Stop reason: HOSPADM

## 2021-07-01 RX ORDER — MAGNESIUM CARB/ALUMINUM HYDROX 105-160MG
296 TABLET,CHEWABLE ORAL ONCE
Status: COMPLETED | OUTPATIENT
Start: 2021-07-01 | End: 2021-07-01

## 2021-07-01 RX ORDER — OXYBUTYNIN CHLORIDE 10 MG/1
10 TABLET, EXTENDED RELEASE ORAL
Status: DISCONTINUED | OUTPATIENT
Start: 2021-07-01 | End: 2021-07-02 | Stop reason: HOSPADM

## 2021-07-01 RX ORDER — OXYBUTYNIN CHLORIDE 10 MG/1
10 TABLET, EXTENDED RELEASE ORAL
Status: DISCONTINUED | OUTPATIENT
Start: 2021-07-01 | End: 2021-07-01

## 2021-07-01 RX ORDER — BACLOFEN 10 MG/1
10 TABLET ORAL
Status: DISCONTINUED | OUTPATIENT
Start: 2021-07-01 | End: 2021-07-02 | Stop reason: HOSPADM

## 2021-07-01 RX ORDER — DULOXETIN HYDROCHLORIDE 60 MG/1
60 CAPSULE, DELAYED RELEASE ORAL DAILY
Status: DISCONTINUED | OUTPATIENT
Start: 2021-07-01 | End: 2021-07-02 | Stop reason: HOSPADM

## 2021-07-01 RX ORDER — ONDANSETRON 4 MG/1
4 TABLET, ORALLY DISINTEGRATING ORAL EVERY 6 HOURS PRN
Status: DISCONTINUED | OUTPATIENT
Start: 2021-07-01 | End: 2021-07-02 | Stop reason: HOSPADM

## 2021-07-01 RX ORDER — FAMOTIDINE 20 MG/1
40 TABLET, FILM COATED ORAL 2 TIMES DAILY
Status: DISCONTINUED | OUTPATIENT
Start: 2021-07-01 | End: 2021-07-02 | Stop reason: HOSPADM

## 2021-07-01 RX ORDER — BACLOFEN 10 MG/1
10 TABLET ORAL 2 TIMES DAILY
COMMUNITY

## 2021-07-01 RX ORDER — POTASSIUM CHLORIDE 750 MG/1
40 TABLET, EXTENDED RELEASE ORAL ONCE
Status: DISCONTINUED | OUTPATIENT
Start: 2021-07-01 | End: 2021-07-02 | Stop reason: HOSPADM

## 2021-07-01 RX ORDER — GABAPENTIN 300 MG/1
600 CAPSULE ORAL 2 TIMES DAILY
Status: DISCONTINUED | OUTPATIENT
Start: 2021-07-01 | End: 2021-07-01

## 2021-07-01 RX ORDER — ACETAMINOPHEN 650 MG/1
650 SUPPOSITORY RECTAL EVERY 4 HOURS PRN
Status: DISCONTINUED | OUTPATIENT
Start: 2021-07-01 | End: 2021-07-02 | Stop reason: HOSPADM

## 2021-07-01 RX ORDER — POLYETHYLENE GLYCOL 3350 17 G/17G
17 POWDER, FOR SOLUTION ORAL 2 TIMES DAILY
Status: DISCONTINUED | OUTPATIENT
Start: 2021-07-01 | End: 2021-07-02 | Stop reason: HOSPADM

## 2021-07-01 RX ORDER — BISACODYL 5 MG
10 TABLET, DELAYED RELEASE (ENTERIC COATED) ORAL ONCE
Status: COMPLETED | OUTPATIENT
Start: 2021-07-01 | End: 2021-07-01

## 2021-07-01 RX ORDER — GABAPENTIN 300 MG/1
600 CAPSULE ORAL 4 TIMES DAILY
Status: ON HOLD | COMMUNITY
End: 2023-11-30

## 2021-07-01 RX ORDER — BACLOFEN 10 MG/1
30 TABLET ORAL AT BEDTIME
COMMUNITY
End: 2021-07-01

## 2021-07-01 RX ORDER — LATANOPROST 50 UG/ML
1 SOLUTION/ DROPS OPHTHALMIC DAILY
Status: DISCONTINUED | OUTPATIENT
Start: 2021-07-01 | End: 2021-07-02 | Stop reason: HOSPADM

## 2021-07-01 RX ORDER — PROPRANOLOL HYDROCHLORIDE 10 MG/1
10 TABLET ORAL 2 TIMES DAILY
Status: DISCONTINUED | OUTPATIENT
Start: 2021-07-01 | End: 2021-07-01

## 2021-07-01 RX ORDER — PROPRANOLOL HYDROCHLORIDE 10 MG/1
10 TABLET ORAL 2 TIMES DAILY
Status: DISCONTINUED | OUTPATIENT
Start: 2021-07-01 | End: 2021-07-02 | Stop reason: HOSPADM

## 2021-07-01 RX ORDER — ACETAMINOPHEN 325 MG/1
650 TABLET ORAL EVERY 4 HOURS PRN
Status: DISCONTINUED | OUTPATIENT
Start: 2021-07-01 | End: 2021-07-02 | Stop reason: HOSPADM

## 2021-07-01 RX ORDER — VALACYCLOVIR HYDROCHLORIDE 500 MG/1
500 TABLET, FILM COATED ORAL 2 TIMES DAILY
Status: DISCONTINUED | OUTPATIENT
Start: 2021-07-01 | End: 2021-07-02 | Stop reason: HOSPADM

## 2021-07-01 RX ORDER — CHLORAL HYDRATE 500 MG
1 CAPSULE ORAL DAILY
COMMUNITY
End: 2022-03-23

## 2021-07-01 RX ORDER — POLYETHYLENE GLYCOL 3350 17 G/17G
1 POWDER, FOR SOLUTION ORAL DAILY
COMMUNITY
End: 2021-07-02

## 2021-07-01 RX ORDER — GABAPENTIN 300 MG/1
600 CAPSULE ORAL 2 TIMES DAILY
Status: DISCONTINUED | OUTPATIENT
Start: 2021-07-01 | End: 2021-07-02 | Stop reason: HOSPADM

## 2021-07-01 RX ADMIN — FERROUS SULFATE TAB 325 MG (65 MG ELEMENTAL FE) 325 MG: 325 (65 FE) TAB at 12:02

## 2021-07-01 RX ADMIN — OXYBUTYNIN 10 MG: 10 TABLET, FILM COATED, EXTENDED RELEASE ORAL at 20:26

## 2021-07-01 RX ADMIN — CEFPODOXIME PROXETIL 100 MG: 100 TABLET, FILM COATED ORAL at 20:26

## 2021-07-01 RX ADMIN — FAMOTIDINE 40 MG: 20 TABLET ORAL at 11:16

## 2021-07-01 RX ADMIN — MAGNESIUM CITRATE 296 ML: 1.75 LIQUID ORAL at 14:39

## 2021-07-01 RX ADMIN — OXYBUTYNIN 10 MG: 10 TABLET, FILM COATED, EXTENDED RELEASE ORAL at 09:44

## 2021-07-01 RX ADMIN — PROPRANOLOL HYDROCHLORIDE 10 MG: 10 TABLET ORAL at 02:00

## 2021-07-01 RX ADMIN — BACLOFEN 30 MG: 20 TABLET ORAL at 20:28

## 2021-07-01 RX ADMIN — OXYBUTYNIN 10 MG: 10 TABLET, FILM COATED, EXTENDED RELEASE ORAL at 02:00

## 2021-07-01 RX ADMIN — CEFPODOXIME PROXETIL 100 MG: 100 TABLET, FILM COATED ORAL at 09:44

## 2021-07-01 RX ADMIN — HYDROCHLOROTHIAZIDE 12.5 MG: 12.5 TABLET ORAL at 09:43

## 2021-07-01 RX ADMIN — BISACODYL 10 MG: 5 TABLET, COATED ORAL at 20:27

## 2021-07-01 RX ADMIN — DULOXETINE HYDROCHLORIDE 60 MG: 60 CAPSULE, DELAYED RELEASE ORAL at 09:43

## 2021-07-01 RX ADMIN — PROPRANOLOL HYDROCHLORIDE 10 MG: 10 TABLET ORAL at 20:27

## 2021-07-01 RX ADMIN — BACLOFEN 10 MG: 10 TABLET ORAL at 09:42

## 2021-07-01 RX ADMIN — POLYETHYLENE GLYCOL 3350, SODIUM CHLORIDE, SODIUM BICARBONATE AND POTASSIUM CHLORIDE WITH LEMON FLAVOR 2000 ML: 420; 11.2; 5.72; 1.48 POWDER, FOR SOLUTION ORAL at 08:28

## 2021-07-01 RX ADMIN — Medication 125 MCG: at 09:44

## 2021-07-01 RX ADMIN — GABAPENTIN 600 MG: 300 CAPSULE ORAL at 09:41

## 2021-07-01 RX ADMIN — FAMOTIDINE 40 MG: 20 TABLET ORAL at 02:00

## 2021-07-01 RX ADMIN — BACLOFEN 30 MG: 20 TABLET ORAL at 02:30

## 2021-07-01 RX ADMIN — POLYETHYLENE GLYCOL 3350 17 G: 17 POWDER, FOR SOLUTION ORAL at 20:27

## 2021-07-01 RX ADMIN — VALACYCLOVIR HYDROCHLORIDE 500 MG: 500 TABLET, FILM COATED ORAL at 20:25

## 2021-07-01 RX ADMIN — GABAPENTIN 600 MG: 300 CAPSULE ORAL at 02:00

## 2021-07-01 RX ADMIN — PROPRANOLOL HYDROCHLORIDE 10 MG: 10 TABLET ORAL at 09:43

## 2021-07-01 RX ADMIN — Medication 1 MG: at 02:00

## 2021-07-01 RX ADMIN — VALACYCLOVIR HYDROCHLORIDE 500 MG: 500 TABLET, FILM COATED ORAL at 09:43

## 2021-07-01 RX ADMIN — GABAPENTIN 600 MG: 300 CAPSULE ORAL at 20:24

## 2021-07-01 RX ADMIN — FAMOTIDINE 40 MG: 20 TABLET ORAL at 20:25

## 2021-07-01 RX ADMIN — LEVOTHYROXINE SODIUM 75 MCG: 75 TABLET ORAL at 08:29

## 2021-07-01 ASSESSMENT — ENCOUNTER SYMPTOMS
SUBJECTIVE PATIENT PAIN CONTROL: PARTIALLY CONTROLLED
SUBJECTIVE PAIN PROGRESSION: UNCHANGED
PAIN SEVERITY NOW: MILD
ACTIVITY IMPAIRMENT: NORMAL
DIETARY ISSUES: POOR INTAKE

## 2021-07-01 NOTE — PHARMACY-ADMISSION MEDICATION HISTORY
Admission Medication History Completed by Pharmacy    See [facility] Admission Navigator for allergy information, preferred outpatient pharmacy, prior to admission medications and immunization status.     Medication History Sources:     Dispense Report    Patient Interview    ED Provider Note 6/30/21    Changes made to PTA medication list (reason):    Added:   o Fish Oil Omega-3 Fatty Acids 1000 MG capsule  o Miralax 17 G/Dose powder    Deleted:   o Baclofen 10 MG tablet (duplicate)    Changed:   o Baclofen 10 MG tablet:  Take 10 MG by mouth daily before breakfast --> Take 2 tablets by mouth in the morning before breakfast, 1 tablet by mouth in the afternoon as needed, and 3 tablets by mouth every evening before bedtime    Additional Information:    Taking 75 MCG Levothyroxine daily since 6/31/21    Reports missing last Saturday's Alendronate dose.  Unsure when her last dose was.      Reports lessening efficacy of Miralax as of late.    Prior to Admission medications    Medication Sig Last Dose Taking? Auth Provider   acetaminophen (TYLENOL) 500 MG tablet Take 1-2 tablets (500-1,000 mg) by mouth every 8 hours as needed for mild pain or fever (greater than 101 degrees) Past Month Yes Carolina Pulliam MD   alendronate (FOSAMAX) 70 MG tablet Take 70 mg by mouth every 7 days On Saturdays Past Month Yes Reported, Patient   baclofen (LIORESAL) 10 MG tablet Take 2 tablets (20 MG) by mouth every morning, 1 tablet (10 MG) by mouth daily in the afternoon as needed, and 3 tablets (30 MG) by mouth every evening before bedtime. 7/1/2021 at AM Yes Reported, Patient   cholecalciferol (VITAMIN D3) 5000 units CAPS capsule Take 7,000 Units by mouth daily  7/1/2021 at AM Yes Unknown, Entered By History   DULoxetine (CYMBALTA) 60 MG capsule TAKE 1 CAPSULE BY MOUTH EVERY DAY 7/1/2021 at AM Yes Carolina Pulliam MD   famotidine (PEPCID) 40 MG tablet TAKE 1 TABLET BY MOUTH 2 TIMES DAILY 7/1/2021 at AM Yes Carolina Pulliam MD   ferrous  sulfate (IRON) 325 (65 Fe) MG tablet Take 1 tablet (325 mg) by mouth daily 7/1/2021 at AM Yes Mary Sahu MD   fish oil-omega-3 fatty acids 1000 MG capsule Take 1 g by mouth daily 7/1/2021 at AM Yes Unknown, Entered By History   gabapentin (NEURONTIN) 300 MG capsule Take 2 tablets (600 MG) by mouth 3-4 times daily. 7/1/2021 at AM Yes Reported, Patient   hydrochlorothiazide (HYDRODIURIL) 12.5 MG tablet TAKE 1 TABLET BY MOUTH EVERY DAY 7/1/2021 at AM Yes Carolina Pulliam MD   latanoprost (XALATAN) 0.005 % ophthalmic solution INSTILL 1 DROP INTO BOTH EYES EVERY DAY AS DIRECTED PT WILL NEED TO BE SEEN FOR FUTURE REFILLS 7/1/2021 at AM Yes Reported, Patient   levothyroxine (SYNTHROID/LEVOTHROID) 25 MCG tablet Pt take 1 tablet daily for 1 week, then 2 tablets a day for 1 week, then 3 tablets daily 7/1/2021 at AM Yes Aisah Osullivan MD   multivitamin, therapeutic with minerals (THERA-VIT-M) TABS Take 1 tablet by mouth daily. 7/1/2021 at AM Yes Ethel Miranda APRN CNP   NICOTROL 10 MG inhaler Inhale 1 Cartridge into the lungs daily as needed for smoking cessation  Past Month Yes Reported, Patient   oxybutynin (DITROPAN-XL) 10 MG 24 hr tablet Take 1 tablet (10 mg) by mouth 2 times daily 7/1/2021 at AM Yes Yair De Leon MD   polyethylene glycol (MIRALAX) 17 GM/Dose powder Take 1 capful by mouth daily Past Week Yes Unknown, Entered By History   propranolol (INDERAL) 10 MG tablet Take 1 tablet (10 mg) by mouth 2 times daily 7/1/2021 at AM Yes Aisha Osullivan MD   valACYclovir (VALTREX) 500 MG tablet Take 1 tablet (500 mg) by mouth 2 times daily 7/1/2021 at AM Yes Carolina Pulliam MD       Date completed: 07/01/21    Medication history completed by: Parminder Holden

## 2021-07-01 NOTE — PROGRESS NOTES
Marylee Woods is a 66 year old female patient.  1. Constipation, unspecified constipation type    2. Acute cystitis without hematuria      Past Medical History:   Diagnosis Date     Gastro-oesophageal reflux disease      Multiple sclerosis (H)      Current Outpatient Medications   Medication Sig Dispense Refill     baclofen (LIORESAL) 10 MG tablet Take 10 mg by mouth daily before breakfast       baclofen (LIORESAL) 10 MG tablet Take 30 mg by mouth At Bedtime       gabapentin (NEURONTIN) 300 MG capsule Take 600 mg by mouth 2 times daily       acetaminophen (TYLENOL) 500 MG tablet Take 1-2 tablets (500-1,000 mg) by mouth every 8 hours as needed for mild pain or fever (greater than 101 degrees)       alendronate (FOSAMAX) 70 MG tablet        cholecalciferol (VITAMIN D3) 5000 units CAPS capsule Take 7,000 Units by mouth daily        DULoxetine (CYMBALTA) 60 MG capsule TAKE 1 CAPSULE BY MOUTH EVERY DAY 90 capsule 0     famotidine (PEPCID) 40 MG tablet TAKE 1 TABLET BY MOUTH 2 TIMES DAILY 180 tablet 2     ferrous sulfate (IRON) 325 (65 Fe) MG tablet Take 1 tablet (325 mg) by mouth daily 100 tablet      hydrochlorothiazide (HYDRODIURIL) 12.5 MG tablet TAKE 1 TABLET BY MOUTH EVERY DAY 90 tablet 0     latanoprost (XALATAN) 0.005 % ophthalmic solution INSTILL 1 DROP INTO BOTH EYES EVERY DAY AS DIRECTED PT WILL NEED TO BE SEEN FOR FUTURE REFILLS       levothyroxine (SYNTHROID/LEVOTHROID) 25 MCG tablet Pt take 1 tablet daily for 1 week, then 2 tablets a day for 1 week, then 3 tablets daily 270 tablet 1     multivitamin, therapeutic with minerals (THERA-VIT-M) TABS Take 1 tablet by mouth daily.       NICOTROL 10 MG inhaler Inhale 1 Cartridge into the lungs daily as needed for smoking cessation   1     oxybutynin (DITROPAN-XL) 10 MG 24 hr tablet Take 1 tablet (10 mg) by mouth 2 times daily 30 tablet      propranolol (INDERAL) 10 MG tablet Take 1 tablet (10 mg) by mouth 2 times daily 270 tablet 3     valACYclovir (VALTREX) 500 MG  "tablet Take 1 tablet (500 mg) by mouth 2 times daily 12 tablet 5     Allergies   Allergen Reactions     Amantadine      hallucinations     Budesonide      Symbicort Rash     Active Problems:    Constipation    Blood pressure 111/64, pulse 67, temperature 98.6  F (37  C), temperature source Oral, resp. rate 16, height 1.727 m (5' 8\"), weight 59 kg (130 lb), SpO2 93 %, not currently breastfeeding.    Subjective:  Symptoms:  Stable.  (Constipation).    Diet:  Poor intake.    Activity level: Normal.    Pain:  She complains of pain that is mild.  She reports pain is unchanged.  Pain is partially controlled.      Objective:  General Appearance:  Comfortable.    Vital signs: (most recent): Blood pressure 111/64, pulse 67, temperature 98.6  F (37  C), temperature source Oral, resp. rate 16, height 1.727 m (5' 8\"), weight 59 kg (130 lb), SpO2 93 %, not currently breastfeeding.  Vital signs are normal.    Output: Producing urine and minimal stool output.    HEENT: Normal HEENT exam.    Lungs:  Normal effort and normal respiratory rate.  Breath sounds clear to auscultation.    Heart: Normal rate.  Regular rhythm.  S1 normal and S2 normal.    Abdomen: Abdomen is soft.  Bowel sounds are normal.   There is no abdominal tenderness.     Extremities: Normal range of motion.    Pulses: Distal pulses are intact.    Neurological: Patient is alert.    Pupils:  Pupils are equal, round, and reactive to light.    Skin:  Warm.      Assessment:    Condition: In stable condition.  Unchanged.   (66 yof with MS [facility] presents with constipation by CT. No relief with pink lady enema. Started golytely this am and await GI input.    Also noted UTI-started vantin po.    Hypokalemia-likely due to hydrochlorothiazide, may need regulr KCl supplement.).     The pt was seen and examined by myself. The case was reviewed and the plan was discussed with the ABHISHEK.    Ene Stephenson MD, MD  7/1/2021    "
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
## 2021-07-01 NOTE — PROGRESS NOTES
Care Management Follow Up    Length of Stay (days): 0    Expected Discharge Date:  TBD     Concerns to be Addressed:  GONZALEZ    Additional Information:  Writer unable to meet with pt in person d/t COVID++ status. Writer attempted to call into pt's room at 1545, but pt did not answer her room phone.     1700 - Writer spoke with pt over her phone. GONZALEZ paperwork was provide to pt by ED staff. Writer answered any of pt's questions. Writer requested pt sign the form. Donya reports pt signed the form and will collect it and place it on pt's chart after she next enters the room. SW to f/u on form being signed tomorrow.    ________________    SCOTT Almodovar, [facility]  ED/Observation   [facility]  Phone: [phone]  Pager: [phone]  Fax: [phone]    On-call pager, [phone], 4:00pm to midnight

## 2021-07-01 NOTE — H&P
[facility]    History and Physical - ED Observation Service       Date of Admission:  6/30/2021    Assessment & Plan      Marylee Woods is a 66 year old female admitted on 6/30/2021. She has a past medical history significant for GERD, Grave's disease, CKD stage 3, HTN, depression, recurrent UTI's and advanced multiple sclerosis who presented to the Emergency Department with abdominal pain and constipation.      ##. Constipation: RLQ abdominal pain and constipation. Usually takes MiraLAX, fiber, laxatives as needed but not on a daily basis but MiraLAX however has not been helping recently. Has not had a good BM in ~3 weeks.  She has been intermittently been able to pass pebble-like amount of stool.  Reports decreased appetite and p.o. intake, nausea. Admits to being able to pass gas. Denies vomiting, fever, chills, night sweats, chest pain, palpitations, headache, lightheadedness, hematochezia. Seen in clinic 6/10/2021 for similar complaint of RLQ pain x6 weeks. KUB reported a large amount of stool throughout the colon c\w constipation. Prior abdominal surgery include cholecystectomy.  She has not had an appendectomy. In ED, HR 64-96, -153/, RR 18, SaO2 % on RA, Temp 98.7. Labs show CMP with K2.9, TP 8.9 otherwise normal.  Normal lipase, CBC.   (264 on 6/10/2021); free T4 0.67.  UA with 300 protein, large blood, large leukocyte esterase, greater than 182 WBC, greater than 182 RBC.  Patient incidentally is Covid PCR positive (states she received her Covid immunization).  CT abdomen pelvis reports large colonic stool burden; adjacent wall of the rectum does not appear significantly thickened but this could be correlated clinically with symptoms for stercoral colitis given rectal stool burden; no evidence of appendicitis or bowel obstruction; enhancement of the bladder wall which could indicate cystitis. In the ED the patient was given 1 L NS bolus,  Zofran 4 mg IV x1, pink lady enema x1, KCl 40 M EQ x1 p.o., KCl 10 M EQ IV x1, Vantin 100 mg p.o. x1.  Patient has been admitted for management of severe constipation as she did not have significant response with pink lady enema.  Constipation is likely related to uncontrolled thyroid disease.  -2L golytely prep in AM  -Zofran, Reglan as needed nausea  -Consider GI consult  -We will need bowel regimen started after GoLYTELY    ##. Cystitis: Baseline incontinent; however had noted recent dysuria intermittently. Otherwise no frequency, urgency, hematuria, fever, chills, night sweats.  Currently afebrile.  No leukocytosis.  UA with 300 protein, large blood, large leukocyte esterase, greater than 182 WBC, greater than 182 RBC. CT abdomen pelvis reports enhancement of the bladder wall which could indicate cystitis. In ED patient was given 1 L NS bolus, Zofran 4 mg IV x1, Vantin 100 mg p.o. x1.  Previous urine cultures have grown E. coli, Enterococcus, Pseudomonas.  Last positive urine culture was 3/31/2021 with E. coli and was pansensitive.  -Continue Vantin 100 p.o. twice daily  -Add on urine culture to UA previously when  -Follow-up on urine culture  -Check postvoid residuals    ##. Hypokalemia: K2.9. In ED patient was given KCl 40 M EQ x1 p.o., KCl 10 M EQ IV x  -Replace K per protocol  -Recheck K level in a.m.  -Check magnesium and phosphorus levels as well    ##. Covid19 Positive: Received Covid vaccines on 2/18/2021 and [id number] (Moderna).  Patient reports cough but thought this was related to her tobacco use.  Also reports significant fatigue though this was thought to be associated with thyroid disease and TSH abnormality.  Denies fever, chills, night sweats, chest pain, palpitations, headache, lightheadedness.  -CXR in a.m.  -Consider repeating Covid 19 PCR swab    ##.  Graves' disease s/p radioactive treatment   ##.  Hypothyroidism  Graves disease s/p I131 treatment February 2021, Patient was seen by PCP with  TSH of 264 on 6/10/2021.  TSH 11.2 on 5/13/2021.  Patient's primary endocrinologist apparently is not available until 7/1/2021.  Looks like another Endocrinologists started levothyroxine on 6/10/2021 at 25 mcg daily and titrated up to 75 mcg daily which is patients currents dose with plan to repeat TSH in 2 months.  Also patient was on propanolol 10 mg 3 times daily, however, dose has been reduced to 10 mg twice daily for now.  TSH today is 191.68, free T4 0.67.  -Discuss with endocrinology if there is need to change dosing or if patient should continue current dosing and follow-up as planned    ##. Essential hypertension: In ED, HR 64-96, -153/.  Takes hydrochlorothiazide 12.5 mg daily.  -Continue PTA hydrochlorothiazide    ##. GERD: - Continue with PTA Famotidine     ##. CKD III: BUN 13, creatinine 0.97.  Baseline appears to be about 0.8-1.1.    ##.  Advanced multiple sclerosis:  diagnosed 1993, manifested again predominantly by left-sided weakness w urinary incontinence ? Neurogenic bladder. Follows up with Dr. Redman for MS, at [facility]. Stable at current. Follows Neurology as outpatient. given prior history of treatment with Lemtrada, in [id number]. Prior to the Lemtrada, she was on beta interferon since 1993, for almost 20 years.    - Continue Valacyclovir 500 bid ppx, Oxybutynin.   - Outpatient follow-up with Neurology.      ##. Depression, anxiety: - Continue PTA Cymbalta     ##. Painful paresthesias: - Continue PTA gabapentin 600mg bid      ##. Neurogenic bladder: - Continue PTA oxybutynin 10mg every day bid  -Check postvoid residuals    ##. Tobacco Abuse: Quit in recently started smoking again.  Declines nicotine patch.       Diet: Regular Diet Adult  DVT Prophylaxis: Pneumatic Compression Devices  Verduzco Catheter: Not present  Central Lines: None  Code Status: Full Code    Risk Factors Present on Admission        # Hypokalemia: K = 2.9 mmol/L (Ref range: 3.4 - 5.3  mmol/L) on admission, will replace as needed            Disposition Plan   Expected discharge: 2 - 3 days, recommended to prior living arrangement once adequate pain management/ tolerating PO medications.     The patient's care was discussed with the Attending Physician, Dr. Stephenson.    LOR Cash  [facility]  Securely message with the Vocera Web Console (learn more here)  Text page via [facility] Paging/Directory      ______________________________________________________________________    Chief Complaint   Abdominal pain and constipation     History is obtained from the patient    History of Present Illness   Marylee Woods is a 66 year old female with a past medical history significant for GERD, Grave's disease, CKD stage 3, HTN, depression, recurrent UTI's and advanced multiple sclerosis who presented to the Emergency Department with abdominal pain and constipation.  Patient reports she has had difficulty with constipation for her whole life. She states she takes MiraLAX, fiber, laxatives as needed but not on a daily basis. Patient reports that MiraLAX usually helps with BM however has not been helping recently.   She reports she has not had a good bowel movement in about 3 weeks.  She has been intermittently been able to pass pebble-like amount of stool.  Reports decreased appetite and p.o. intake, nausea.  No vomiting. Admits to being able to pass gas.  Admits to cough though has recently resumed smoking and thought this was related.  She also admits to extreme fatigue however she has related this to her abnormal thyroid levels.  Patient admits to intermittent dysuria, though she notes urinary incontinence most of the time.  Denies any hematuria otherwise.  Patient also denies any fever, chills, night sweats, chest pain, palpitations, headache, lightheadedness.    Patient was seen in the clinic on 6/10/2021 for similar complaint of right lower quadrant pain  x6 weeks. KUB at that time reported a large amount of stool throughout the colon consistent with constipation.  It appears that the clinic had called a yesterday to check on how she was doing with her levothyroxine dosing at which time she complained of constipation with really hard stools and was asked to come to the ED.  Prior abdominal surgery include cholecystectomy.  She has not had an appendectomy.       In the ED, HR 64-96, -153/, RR 18, SaO2 % on RA, Temp 98.7. Labs show CMP with K2.9, TP 8.9 otherwise normal.  Normal lipase, CBC.  ; free T4 0.67.  UA with 300 protein, large blood, large leukocyte esterase, greater than 182 WBC, greater than 182 RBC.  Patient incidentally is Covid PCR positive (states she received her Covid immunization).  CT abdomen pelvis reports large colonic stool burden; adjacent wall of the rectum does not appear significantly thickened but this could be correlated clinically with symptoms for stercoral colitis given rectal stool burden; no evidence of appendicitis or bowel obstruction; enhancement of the bladder wall which could indicate cystitis. In the ED the patient was given 1 L NS bolus, Zofran 4 mg IV x1, pink lady enema x1, KCl 40 M EQ x1 p.o., KCl 10 M EQ IV x1, Vantin 100 mg p.o. x1.  Patient has been admitted for management of severe constipation as she did not have significant response with pink lady enema.    Review of Systems    All other ROS negative except those mentioned in above note.      Past Medical History    I have reviewed this patient's medical history and updated it with pertinent information if needed.   Past Medical History:   Diagnosis Date     Gastro-oesophageal reflux disease      Multiple sclerosis (H)        Past Surgical History   I have reviewed this patient's surgical history and updated it with pertinent information if needed.  Past Surgical History:   Procedure Laterality Date     C/SECTION, LOW TRANSVERSE  1992      COLONOSCOPY  2007     COLONOSCOPY N/A 1/26/2017    Procedure: COMBINED COLONOSCOPY, SINGLE OR MULTIPLE BIOPSY/POLYPECTOMY BY BIOPSY;  Surgeon: Mayo Adkins MD, MD;  Location:  GI     ESOPHAGOSCOPY, GASTROSCOPY, DUODENOSCOPY (EGD), COMBINED  1/26/2017    Dr. Adkins [facility]     ESOPHAGOSCOPY, GASTROSCOPY, DUODENOSCOPY (EGD), COMBINED N/A 1/26/2017    Procedure: COMBINED ESOPHAGOSCOPY, GASTROSCOPY, DUODENOSCOPY (EGD), BIOPSY SINGLE OR MULTIPLE;  Surgeon: Mayo Adkins MD, MD;  Location:  GI     HIP SURGERY  2009    femur ortho surgery     LAPAROSCOPIC CHOLECYSTECTOMY  6/24/2014    Procedure: LAPAROSCOPIC CHOLECYSTECTOMY;  Surgeon: Randy Bailey MD;  Location: [facility]     OPEN REDUCTION INTERNAL FIXATION FEMUR DISTAL Left 11/1/2018    Procedure: OPEN REDUCTION INTERNAL FIXATION LEFT FEMUR DISTAL;  Surgeon: Jose Valadez MD;  Location:  OR     OPEN REDUCTION INTERNAL FIXATION RODDING INTRAMEDULLARY TIBIA  4/14/2013    Procedure: OPEN REDUCTION INTERNAL FIXATION RODDING INTRAMEDULLARY TIBIA;;  Surgeon: Sajan Cast MD;  Location: [facility] OR     ORTHOPEDIC SURGERY  2009    surgery right upper femur fx near hip       Social History   I have reviewed this patient's social history and updated it with pertinent information if needed.  Social History     Tobacco Use     Smoking status: Current Some Day Smoker     Packs/day: 0.25     Types: Cigarettes     Smokeless tobacco: Former User     Quit date: 10/31/2018   Substance Use Topics     Alcohol use: Yes     Alcohol/week: 0.0 standard drinks     Comment: 1/wk     Drug use: No       Family History   I have reviewed this patient's family history and updated it with pertinent information if needed.  Family History   Problem Relation Age of Onset     Heart Disease Maternal Grandmother      Cancer Maternal Grandfather      Heart Disease Paternal Grandfather      Heart Disease Mother      Heart Disease Father      Diabetes No family hx of      Coronary Artery  Disease No family hx of      Hypertension No family hx of      Hyperlipidemia No family hx of      Cerebrovascular Disease No family hx of      Breast Cancer No family hx of      Colon Cancer No family hx of      Prostate Cancer No family hx of      Other Cancer No family hx of      Depression No family hx of      Anxiety Disorder No family hx of      Mental Illness No family hx of      Substance Abuse No family hx of      Anesthesia Reaction No family hx of      Asthma No family hx of      Osteoporosis No family hx of      Genetic Disorder No family hx of      Thyroid Disease No family hx of      Obesity No family hx of      Unknown/Adopted No family hx of        Prior to Admission Medications   Prior to Admission Medications   Prescriptions Last Dose Informant Patient Reported? Taking?   DULoxetine (CYMBALTA) 60 MG capsule   No No   Sig: TAKE 1 CAPSULE BY MOUTH EVERY DAY   NICOTROL 10 MG inhaler   Yes No   Sig: Inhale 1 Cartridge into the lungs daily as needed for smoking cessation    acetaminophen (TYLENOL) 500 MG tablet   No No   Sig: Take 1-2 tablets (500-1,000 mg) by mouth every 8 hours as needed for mild pain or fever (greater than 101 degrees)   alendronate (FOSAMAX) 70 MG tablet   Yes No   baclofen (LIORESAL) 10 MG tablet   Yes Yes   Sig: Take 10 mg by mouth daily before breakfast   baclofen (LIORESAL) 10 MG tablet   Yes Yes   Sig: Take 30 mg by mouth At Bedtime   cholecalciferol (VITAMIN D3) 5000 units CAPS capsule   Yes No   Sig: Take 7,000 Units by mouth daily    famotidine (PEPCID) 40 MG tablet   No No   Sig: TAKE 1 TABLET BY MOUTH 2 TIMES DAILY   ferrous sulfate (IRON) 325 (65 Fe) MG tablet   Yes No   Sig: Take 1 tablet (325 mg) by mouth daily   gabapentin (NEURONTIN) 300 MG capsule   Yes Yes   Sig: Take 600 mg by mouth 2 times daily   hydrochlorothiazide (HYDRODIURIL) 12.5 MG tablet   No No   Sig: TAKE 1 TABLET BY MOUTH EVERY DAY   latanoprost (XALATAN) 0.005 % ophthalmic solution   Yes No   Sig:  INSTILL 1 DROP INTO BOTH EYES EVERY DAY AS DIRECTED PT WILL NEED TO BE SEEN FOR FUTURE REFILLS   levothyroxine (SYNTHROID/LEVOTHROID) 25 MCG tablet   No No   Sig: Pt take 1 tablet daily for 1 week, then 2 tablets a day for 1 week, then 3 tablets daily   multivitamin, therapeutic with minerals (THERA-VIT-M) TABS  Self No No   Sig: Take 1 tablet by mouth daily.   oxybutynin (DITROPAN-XL) 10 MG 24 hr tablet   No No   Sig: Take 1 tablet (10 mg) by mouth 2 times daily   propranolol (INDERAL) 10 MG tablet   No No   Sig: Take 1 tablet (10 mg) by mouth 2 times daily   valACYclovir (VALTREX) 500 MG tablet   No No   Sig: Take 1 tablet (500 mg) by mouth 2 times daily      Facility-Administered Medications: None     Allergies   Allergies   Allergen Reactions     Amantadine      hallucinations     Budesonide      Symbicort Rash       Physical Exam   Vital Signs: Temp: 98.9  F (37.2  C) Temp src: Oral BP: 102/61 Pulse: 93   Resp: 18 SpO2: 97 % O2 Device: None (Room air)    Weight: 130 lbs 0 oz    Constitutional: awake, alert, cooperative, no apparent distress, and appears stated age  Eyes: Lids and lashes normal, pupils equal, round and reactive to light, extra ocular muscles intact, sclera clear, conjunctiva normal  ENT: Normocephalic, without obvious abnormality, atraumatic, sinuses nontender on palpation, external ears without lesions, oral pharynx with moist mucous membranes, tonsils without erythema or exudates, gums normal and good dentition.  Hematologic / Lymphatic: no cervical lymphadenopathy  Respiratory: No increased work of breathing, good air exchange, clear to auscultation bilaterally, no crackles or wheezing  Cardiovascular: Normal apical impulse, regular rate and rhythm, normal S1 and S2, no S3 or S4, and no murmur noted  GI: No scars, normal bowel sounds, soft, non-distended, diffuse abdominal tenderness, no masses palpated, no hepatosplenomegally  Skin: no bruising or bleeding  Musculoskeletal: There is no  redness, warmth, or swelling of the joints.  Full range of motion noted.  Motor strength is 5 out of 5 all extremities bilaterally.  Tone is normal.  Neurologic: Awake, alert, oriented to name, place and time.  Cranial nerves II-XII are grossly intact.  Motor is 5 out of 5 bilaterally.  Cerebellar finger to nose, heel to shin intact.  Sensory is intact.  Babinski down going, Romberg negative, and gait is normal.  Neuropsychiatric: General: normal, calm and normal eye contact      Data   Data reviewed today: I reviewed all medications, new labs and imaging results over the last 24 hours. I personally reviewed   Recent Labs   Lab 06/30/21 1717   WBC 8.1   HGB 14.7   MCV 96         POTASSIUM 2.9*   CHLORIDE 98   CO2 32   BUN 13   CR 0.97   ANIONGAP 6   JORDON 9.6   GLC 82   ALBUMIN 4.0   PROTTOTAL 8.9*   BILITOTAL 0.7   ALKPHOS 72   ALT 21   AST 26   LIPASE 60*     Most Recent 3 CBC's:  Recent Labs   Lab Test 06/30/21  1717 06/10/21  0904 07/03/20  1525   WBC 8.1 5.7 3.6*   HGB 14.7 12.2 9.2*   MCV 96 92 96    177 181     Most Recent 3 BMP's:  Recent Labs   Lab Test 06/30/21 1717 01/05/21 07/03/20  1525 10/23/19  1540 10/23/19  1540     --  141  --  139   POTASSIUM 2.9*  --  4.2  --  4.1   CHLORIDE 98  --  106  --  104   CO2 32  --  29  --  29   BUN 13  --  26  --  22   CR 0.97 0.85 1.17*   < > 1.30*   ANIONGAP 6  --  6  --  6   JORDON 9.6  --  10.0  --  9.4   GLC 82  --  83  --  81    < > = values in this interval not displayed.     Most Recent 2 LFT's:  Recent Labs   Lab Test 06/30/21 1717 06/29/20 11/01/17  0047   AST 26 30 18   ALT 21 18 15   ALKPHOS 72  --  77   BILITOTAL 0.7  --  0.5     Most Recent 3 INR's:  Recent Labs   Lab Test 10/31/18  1859   INR 1.03     Most Recent 3 Creatinines:  Recent Labs   Lab Test 06/30/21  1717 01/05/21 07/03/20  1525   CR 0.97 0.85 1.17*     Most Recent 3 Hemoglobins:  Recent Labs   Lab Test 06/30/21  1717 06/10/21  0904 07/03/20  1525   HGB 14.7 12.2 9.2*      Most Recent 3 Troponin's:No lab results found.  Most Recent TSH and T4:  Recent Labs   Lab Test 06/30/21  1717   .68*   T4 0.67*     Most Recent Urinalysis:  Recent Labs   Lab Test 06/30/21  2132 04/14/21  1704   COLOR Yellow Yellow   APPEARANCE Slightly Cloudy Clear   URINEGLC Negative Negative   URINEBILI Negative Negative   URINEKETONE Negative Negative   SG 1.010 1.015   UBLD Large* Negative   URINEPH 7.0 5.0   PROTEIN 300* Negative   UROBILINOGEN  --  0.2   NITRITE Negative Negative   LEUKEST Large* Negative   RBCU >182* O - 2   WBCU >182* 0 - 5     Most Recent ESR & CRP:  Recent Labs   Lab Test 07/01/21  0243 06/30/21  1717   SED 17  --    CRP  --  <2.9     Recent Results (from the past 24 hour(s))   Abdomen XR, 2 vw, flat and upright    Narrative    EXAM: XR ABDOMEN 2VIEWS  6/30/2021 3:30 PM      HISTORY: abdominal pain, constipation    COMPARISON: Abdominal radiograph 6/10/2021    FINDINGS: Supine and upright abdominal radiographs. Cholecystectomy  clips. Large amount of stool, predominantly within the distal  descending and rectosigmoid colon. Gaseous distention of the colon at  the splenic flexure. No pneumatosis. No portal venous gas. Visualized  portions of the lung demonstrate no focal airspace opacities. Surgical  changes in the right hip.      Impression    IMPRESSION: Large colonic stool burden, predominantly involving the  distal colon. Gaseous distention of the splenic flexure.         I have personally reviewed the examination and initial interpretation  and I agree with the findings.    DORA VILLA MD          SYSTEM ID:  [id number]   CT Abdomen Pelvis w Contrast    Narrative    EXAMINATION: CT ABDOMEN PELVIS W CONTRAST, 6/30/2021 7:22 PM    TECHNIQUE:  Helical CT images from the lung bases through the  symphysis pubis were obtained with IV contrast. Contrast dose: 80 mL    COMPARISON: CT abdomen and pelvis 6/9/2014    HISTORY: RLQ abdominal pain, appendicitis suspected (Age >=  14y);  Bowel obstruction suspected    FINDINGS:    Liver: Normal parenchymal attenuation without focal mass. Minor focal  fatty change along the falciform ligament.  Biliary system: Postoperative changes of cholecystectomy. Mild  prominence of the common duct and central intrahepatic ducts  consistent with same. Duct tapers to the ampulla..  Pancreas: Atrophic appearance of the pancreas.  Stomach: Small sliding-type hiatal hernia.  Spleen: Within normal limits. Splenules in the hilum.  Adrenal glands: Within normal limits.  Kidneys: 2 subcentimeter right-sided simple appearing renal cysts.  Bilateral pelviectasis.  Bladder: Slight diffuse hyperenhancement of the bladder wall.  Reproductive organs: Within normal limits. Small amount of fluid  suspected in the vagina, possibly related to suspected urinary  incontinence.  Colon: Large colonic stool burden. There is some presacral/perirectal  stranding and fluid.  Appendix: Within normal limits as seen on coronal series 3 image 28.  Small Bowel: Within normal limits.  Lymph nodes: No intra-abdominal or pelvic lymphadenopathy.  Vasculature: Mild aortic atherosclerosis. No significant aneurysm or  stenosis.  Peritoneum: No intraabdominal free air. Trace ascites.    Lung bases: Minimal amount of bibasilar atelectasis.    Bones and soft tissues: No suspicious soft tissue mass or fluid  collection. 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 Postsurgical changes of right intertrochanteric  hip fixation, hardware appears intact. Transitional lumbosacral  vertebrae with partial lumbarization of S1. No suspicious osseous  lesion.      Impression    IMPRESSION:   1. Large colonic stool burden. Small amount of ascites demonstrated  with some presacral/perirectal edema and fluid. The adjacent wall of  the rectum does not appear significantly thickened but this could be  correlated  clinically with symptoms for stercoral colitis given rectal  stool burden.  2. No evidence of appendicitis or bowel obstruction as questioned.  3. Enhancement of the bladder wall which could indicate cystitis  suggest correlation with urinalysis.      I have personally reviewed the examination and initial interpretation  and I agree with the findings.    RICHARD MYERS MD          SYSTEM ID:  [id number]

## 2021-07-01 NOTE — TELEPHONE ENCOUNTER
Patient called.  No answer.  I reviewed the lab results:  5/13/2021 TSH 11.2  6/8/2021   6/30/2021 , free T4 0.67    She started taking the 75 mcg daily on 6/29/2021.  The plan is to schedule a follow-up appointment in 4 weeks, and recheck the thyroid hormone levels at that time.

## 2021-07-02 VITALS
BODY MASS INDEX: 19.7 KG/M2 | OXYGEN SATURATION: 100 % | WEIGHT: 130 LBS | HEART RATE: 77 BPM | TEMPERATURE: 98.5 F | RESPIRATION RATE: 16 BRPM | DIASTOLIC BLOOD PRESSURE: 57 MMHG | HEIGHT: 68 IN | SYSTOLIC BLOOD PRESSURE: 115 MMHG

## 2021-07-02 PROBLEM — N30.00 ACUTE CYSTITIS WITHOUT HEMATURIA: Status: ACTIVE | Noted: 2021-07-02

## 2021-07-02 LAB
ALBUMIN SERPL-MCNC: 3.4 G/DL (ref 3.4–5)
ALP SERPL-CCNC: 64 U/L (ref 40–150)
ALT SERPL W P-5'-P-CCNC: 19 U/L (ref 0–50)
ANION GAP SERPL CALCULATED.3IONS-SCNC: 9 MMOL/L (ref 3–14)
AST SERPL W P-5'-P-CCNC: 30 U/L (ref 0–45)
BACTERIA SPEC CULT: ABNORMAL
BACTERIA SPEC CULT: ABNORMAL
BASOPHILS # BLD AUTO: 0.1 10E9/L (ref 0–0.2)
BASOPHILS NFR BLD AUTO: 0.5 %
BILIRUB SERPL-MCNC: 0.7 MG/DL (ref 0.2–1.3)
BUN SERPL-MCNC: 25 MG/DL (ref 7–30)
CALCIUM SERPL-MCNC: 8.8 MG/DL (ref 8.5–10.1)
CHLORIDE SERPL-SCNC: 104 MMOL/L (ref 94–109)
CO2 SERPL-SCNC: 25 MMOL/L (ref 20–32)
CREAT SERPL-MCNC: 0.96 MG/DL (ref 0.52–1.04)
DIFFERENTIAL METHOD BLD: ABNORMAL
EOSINOPHIL # BLD AUTO: 0.2 10E9/L (ref 0–0.7)
EOSINOPHIL NFR BLD AUTO: 2.1 %
ERYTHROCYTE [DISTWIDTH] IN BLOOD BY AUTOMATED COUNT: 15.3 % (ref 10–15)
GFR SERPL CREATININE-BSD FRML MDRD: 62 ML/MIN/{1.73_M2}
GLUCOSE BLDC GLUCOMTR-MCNC: 95 MG/DL (ref 70–99)
GLUCOSE SERPL-MCNC: 57 MG/DL (ref 70–99)
HCT VFR BLD AUTO: 36.6 % (ref 35–47)
HGB BLD-MCNC: 12.6 G/DL (ref 11.7–15.7)
IMM GRANULOCYTES # BLD: 0.1 10E9/L (ref 0–0.4)
IMM GRANULOCYTES NFR BLD: 0.5 %
LYMPHOCYTES # BLD AUTO: 0.9 10E9/L (ref 0.8–5.3)
LYMPHOCYTES NFR BLD AUTO: 9.6 %
Lab: ABNORMAL
MCH RBC QN AUTO: 33.4 PG (ref 26.5–33)
MCHC RBC AUTO-ENTMCNC: 34.4 G/DL (ref 31.5–36.5)
MCV RBC AUTO: 97 FL (ref 78–100)
MONOCYTES # BLD AUTO: 0.3 10E9/L (ref 0–1.3)
MONOCYTES NFR BLD AUTO: 3.5 %
NEUTROPHILS # BLD AUTO: 8.2 10E9/L (ref 1.6–8.3)
NEUTROPHILS NFR BLD AUTO: 83.8 %
NRBC # BLD AUTO: 0 10*3/UL
NRBC BLD AUTO-RTO: 0 /100
PLATELET # BLD AUTO: 164 10E9/L (ref 150–450)
POTASSIUM SERPL-SCNC: 3.9 MMOL/L (ref 3.4–5.3)
POTASSIUM SERPL-SCNC: 3.9 MMOL/L (ref 3.4–5.3)
PROT SERPL-MCNC: 7.2 G/DL (ref 6.8–8.8)
RBC # BLD AUTO: 3.77 10E12/L (ref 3.8–5.2)
SODIUM SERPL-SCNC: 139 MMOL/L (ref 133–144)
SPECIMEN SOURCE: ABNORMAL
WBC # BLD AUTO: 9.8 10E9/L (ref 4–11)

## 2021-07-02 PROCEDURE — 250N000013 HC RX MED GY IP 250 OP 250 PS 637: Performed by: PHYSICIAN ASSISTANT

## 2021-07-02 PROCEDURE — 85025 COMPLETE CBC W/AUTO DIFF WBC: CPT | Performed by: EMERGENCY MEDICINE

## 2021-07-02 PROCEDURE — 999N001017 HC STATISTIC GLUCOSE BY METER IP

## 2021-07-02 PROCEDURE — 99235 HOSP IP/OBS SAME DATE MOD 70: CPT | Performed by: INTERNAL MEDICINE

## 2021-07-02 PROCEDURE — 80053 COMPREHEN METABOLIC PANEL: CPT | Performed by: EMERGENCY MEDICINE

## 2021-07-02 PROCEDURE — 250N000013 HC RX MED GY IP 250 OP 250 PS 637: Performed by: EMERGENCY MEDICINE

## 2021-07-02 PROCEDURE — G0378 HOSPITAL OBSERVATION PER HR: HCPCS

## 2021-07-02 PROCEDURE — 36415 COLL VENOUS BLD VENIPUNCTURE: CPT | Performed by: INTERNAL MEDICINE

## 2021-07-02 RX ORDER — POLYETHYLENE GLYCOL 3350 17 G/17G
17 POWDER, FOR SOLUTION ORAL DAILY
Qty: 510 G | Refills: 0 | Status: SHIPPED | OUTPATIENT
Start: 2021-07-02 | End: 2023-01-20

## 2021-07-02 RX ORDER — ALENDRONATE SODIUM 70 MG/1
70 TABLET ORAL
Qty: 2 TABLET | Refills: 0 | Status: SHIPPED | OUTPATIENT
Start: 2021-07-02 | End: 2021-08-16

## 2021-07-02 RX ORDER — CEFPODOXIME PROXETIL 100 MG/1
100 TABLET, FILM COATED ORAL 2 TIMES DAILY
Qty: 12 TABLET | Refills: 0 | Status: SHIPPED | OUTPATIENT
Start: 2021-07-02 | End: 2021-07-08

## 2021-07-02 RX ORDER — SENNOSIDES 8.6 MG
1 TABLET ORAL DAILY
Qty: 30 TABLET | Refills: 0 | Status: SHIPPED | OUTPATIENT
Start: 2021-07-02 | End: 2022-03-31

## 2021-07-02 RX ADMIN — BACLOFEN 10 MG: 10 TABLET ORAL at 09:41

## 2021-07-02 RX ADMIN — CEFPODOXIME PROXETIL 100 MG: 100 TABLET, FILM COATED ORAL at 09:40

## 2021-07-02 RX ADMIN — OXYBUTYNIN 10 MG: 10 TABLET, FILM COATED, EXTENDED RELEASE ORAL at 09:40

## 2021-07-02 RX ADMIN — HYDROCHLOROTHIAZIDE 12.5 MG: 12.5 TABLET ORAL at 09:39

## 2021-07-02 RX ADMIN — POLYETHYLENE GLYCOL 3350 17 G: 17 POWDER, FOR SOLUTION ORAL at 09:41

## 2021-07-02 RX ADMIN — GABAPENTIN 600 MG: 300 CAPSULE ORAL at 09:38

## 2021-07-02 RX ADMIN — LEVOTHYROXINE SODIUM 75 MCG: 75 TABLET ORAL at 08:36

## 2021-07-02 RX ADMIN — DULOXETINE HYDROCHLORIDE 60 MG: 60 CAPSULE, DELAYED RELEASE ORAL at 09:40

## 2021-07-02 RX ADMIN — PROPRANOLOL HYDROCHLORIDE 10 MG: 10 TABLET ORAL at 09:39

## 2021-07-02 RX ADMIN — VALACYCLOVIR HYDROCHLORIDE 500 MG: 500 TABLET, FILM COATED ORAL at 09:39

## 2021-07-02 RX ADMIN — FAMOTIDINE 40 MG: 20 TABLET ORAL at 09:38

## 2021-07-02 RX ADMIN — Medication 125 MCG: at 09:39

## 2021-07-02 ASSESSMENT — ENCOUNTER SYMPTOMS
SUBJECTIVE PAIN PROGRESSION: IMPROVING
ACTIVITY IMPAIRMENT: NORMAL
PAIN SEVERITY NOW: MILD
DIETARY ISSUES: POOR INTAKE
SUBJECTIVE PATIENT PAIN CONTROL: WELL CONTROLLED

## 2021-07-02 NOTE — DISCHARGE INSTRUCTIONS
You were admitted to the ER observation unit for constipation.  Your CT scan showed a large amount of stool.  But it did not show a bowel obstruction.  You were started on an aggressive bowel regimen.  You began to have moderate sized soft stools.  You were able to eat.  You were discharged home on the new bowel regimen of MiraLAX twice a day and senna once a day.  You can decrease these amounts as needed at home.  Your urinalysis also showed a possible bladder infection.  You were started on antibiotics for this.  Your urine culture is pending.  You will be called if your antibiotics need to be changed.  Your TSH was 191.  Continue to take your Synthroid 75 mcg/day.  Follow-up with your endocrinologist.  You will need repeat thyroid studies in 4 to 6 weeks.  Your potassium was low in the emergency room, you were given potassium replacement.  Your potassium was normal this morning.  Please follow-up with your primary care provider in 5 days to have your potassium rechecked.      Follow up with primary care provider, Carolina Pulliam, within 7 days for hospital follow- up.  The following labs/tests are recommended: BMP.       Appointments on [place] and/or [facility] (with [facility] or [facility] provider or service). Call [phone] if you haven't heard regarding these appointments within 7 days of discharge.    Return to the ER if you have worsening abdominal pain, fever, vomiting, blood in stool or vomit, or worsening constipation

## 2021-07-02 NOTE — INTERIM SUMMARY
Brief Endocrinology Note    Called by SWAPNIL Morgan CNP regarding recent TSH level noted to be elevated to 191.68 on 06/30/21 (records visible in care everywhere) and whether there are any new recommendations for levothyroxine dosing.    Per chart review, appears her primary Endocrinologist Dr. Bean had noted the results already in her note from 06/30/21 and outlined that the plan was continued levothyroxine dosing at 75 mcg daily. She has only recently started on levothyroxine dosing on 06/29/21 and it would be too early to see a change in her thyroid labs at this time. Per ED providers she is compliant with her levothyroxine dosing.     - Advised continuing current dose of levothyroxine 75 mcg daily  - Advised keeping follow up appointment in 4 weeks as per prior plan.    Eliu Chacko  Endocrinology Fellow PGY4

## 2021-07-02 NOTE — DISCHARGE SUMMARY
Discharge Summary    Marylee Woods MRN# [id number]   YOB: 1955 Age: 66 year old     Date of Admission:  6/30/2021  Date of Discharge:  7/2/2021  Admitting Physician:  Ene Stephenson MD  Discharge Physician:  Ene Stephenson MD   Discharging Service:  Emergency Medicine     Primary Provider: Carolina Pulliam          Discharge Diagnosis:     Constipation    Acute cystitis without hematuria    * No resolved hospital problems. *  hypothyroidism               Discharge Disposition:   Discharged to home           Condition on Discharge:   Discharge condition: Stable   Code status on discharge: Full Code           Procedures:   No procedures performed during this admission          Discharge Medications:     Current Discharge Medication List      START taking these medications    Details   cefpodoxime (VANTIN) 100 MG tablet Take 1 tablet (100 mg) by mouth 2 times daily for 6 days  Qty: 12 tablet, Refills: 0    Associated Diagnoses: Acute cystitis without hematuria      sennosides (SENOKOT) 8.6 MG tablet Take 1 tablet by mouth daily  Qty: 30 tablet, Refills: 0    Associated Diagnoses: Constipation, unspecified constipation type         CONTINUE these medications which have CHANGED    Details   polyethylene glycol (MIRALAX) 17 GM/Dose powder Take 17 g by mouth daily  Qty: 510 g, Refills: 0    Associated Diagnoses: Constipation, unspecified constipation type         CONTINUE these medications which have NOT CHANGED    Details   acetaminophen (TYLENOL) 500 MG tablet Take 1-2 tablets (500-1,000 mg) by mouth every 8 hours as needed for mild pain or fever (greater than 101 degrees)      alendronate (FOSAMAX) 70 MG tablet Take 70 mg by mouth every 7 days On Saturdays      baclofen (LIORESAL) 10 MG tablet Take 2 tablets (20 MG) by mouth every morning, 1 tablet (10 MG) by mouth daily in the afternoon as needed, and 3 tablets (30 MG) by mouth every evening before bedtime.      cholecalciferol (VITAMIN D3) 5000 units  CAPS capsule Take 7,000 Units by mouth daily       DULoxetine (CYMBALTA) 60 MG capsule TAKE 1 CAPSULE BY MOUTH EVERY DAY  Qty: 90 capsule, Refills: 0    Associated Diagnoses: Moderate episode of recurrent major depressive disorder (H)      famotidine (PEPCID) 40 MG tablet TAKE 1 TABLET BY MOUTH 2 TIMES DAILY  Qty: 180 tablet, Refills: 2    Associated Diagnoses: Gastroesophageal reflux disease without esophagitis      ferrous sulfate (IRON) 325 (65 Fe) MG tablet Take 1 tablet (325 mg) by mouth daily  Qty: 100 tablet    Associated Diagnoses: MS (multiple sclerosis) (H)      fish oil-omega-3 fatty acids 1000 MG capsule Take 1 g by mouth daily      gabapentin (NEURONTIN) 300 MG capsule Take 2 tablets (600 MG) by mouth 3-4 times daily.      hydrochlorothiazide (HYDRODIURIL) 12.5 MG tablet TAKE 1 TABLET BY MOUTH EVERY DAY  Qty: 90 tablet, Refills: 0    Associated Diagnoses: Essential hypertension, benign      latanoprost (XALATAN) 0.005 % ophthalmic solution INSTILL 1 DROP INTO BOTH EYES EVERY DAY AS DIRECTED PT WILL NEED TO BE SEEN FOR FUTURE REFILLS      levothyroxine (SYNTHROID/LEVOTHROID) 25 MCG tablet Pt take 1 tablet daily for 1 week, then 2 tablets a day for 1 week, then 3 tablets daily  Qty: 270 tablet, Refills: 1    Associated Diagnoses: Graves' disease      multivitamin, therapeutic with minerals (THERA-VIT-M) TABS Take 1 tablet by mouth daily.    Associated Diagnoses: Anemia      NICOTROL 10 MG inhaler Inhale 1 Cartridge into the lungs daily as needed for smoking cessation   Refills: 1      oxybutynin (DITROPAN-XL) 10 MG 24 hr tablet Take 1 tablet (10 mg) by mouth 2 times daily  Qty: 30 tablet    Associated Diagnoses: Neurogenic bladder      propranolol (INDERAL) 10 MG tablet Take 1 tablet (10 mg) by mouth 2 times daily  Qty: 270 tablet, Refills: 3    Associated Diagnoses: Graves' disease      valACYclovir (VALTREX) 500 MG tablet Take 1 tablet (500 mg) by mouth 2 times daily  Qty: 12 tablet, Refills: 5     Associated Diagnoses: Herpes simplex disease                   Consultations:   No consultations were requested during this admission             Brief History of Illness:   Please see detailed H&P from 7/2/21, in brief: Marylee Woods is a 66 year old female admitted on 6/30/2021. She has a past medical history significant for GERD, Grave's disease, CKD stage 3, HTN, depression, recurrent UTI's and advanced multiple sclerosis who presented to the Emergency Department with abdominal pain and constipation.          Hospital Course:   ##. Constipation: RLQ abdominal pain and constipation. Usually takes MiraLAX, fiber, laxatives as needed but not on a daily basis but MiraLAX however has not been helping recently. Has not had a good BM in ~3 weeks.  She has been intermittently been able to pass pebble-like amount of stool.  Reports decreased appetite and p.o. intake, nausea. Admits to being able to pass gas. Denies vomiting, fever, chills, night sweats, chest pain, palpitations, headache, lightheadedness, hematochezia. Seen in clinic 6/10/2021 for similar complaint of RLQ pain x6 weeks. KUB reported a large amount of stool throughout the colon c\w constipation. Prior abdominal surgery include cholecystectomy.  She has not had an appendectomy. In ED, HR 64-96, -153/, RR 18, SaO2 % on RA, Temp 98.7. Labs show CMP with K2.9, TP 8.9 otherwise normal.  Normal lipase, CBC.   (264 on 6/10/2021); free T4 0.67.  UA with 300 protein, large blood, large leukocyte esterase, greater than 182 WBC, greater than 182 RBC.  Patient incidentally is Covid PCR positive (states she received her Covid immunization).  CT abdomen pelvis reports large colonic stool burden; adjacent wall of the rectum does not appear significantly thickened but this could be correlated clinically with symptoms for stercoral colitis given rectal stool burden; no evidence of appendicitis or bowel obstruction; enhancement of the bladder wall  which could indicate cystitis. In the ED the patient was given 1 L NS bolus, Zofran 4 mg IV x1, pink lady enema x1, KCl 40 M EQ x1 p.o., KCl 10 M EQ IV x1, Vantin 100 mg p.o. x1.  Patient has been admitted for management of severe constipation as she did not have significant response with pink lady enema. In the ED obs she was started on golytely this morning. She had 3 small loose BM throughout the day. Magnesium citrate was added this afternoon. Miralax increased to BID. Will also add Senna daily. We will continue to monitor the patient overnight. She is currently able to tolerate po. Denies abdominal pain, nausea or vomiting. No blood in the stool.  Anticipate discharge tomorrow if constipation resolves   Overnight the patient had 1 bowel movement.  She had another bowel movement this morning that was moderate in size and soft.  Her vital signs are stable.  Bowel sounds present.  Slight lower quadrant abdominal tenderness, but no rebound or guarding.  She is eating well with no vomiting.  We will plan to discharge her on MiraLAX twice a day and senna daily.  She was in agreement with this.  She was discharged to home in stable condition, given instructions on when to return to the ED.  She was instructed to follow-up with her primary care provider in 5 to 7 days for repeat basic metabolic panel.    ##. Hypoglycemia: glucose 57 prior to eating.  She ate breakfast and recheck was 95.  Patient informed to eat small frequent meals.  Will have repeat basic metabolic panel with her primary care provider in 5 to 7 days.       ##. Cystitis: Baseline incontinent; however had noted recent dysuria intermittently. Otherwise no frequency, urgency, hematuria, fever, chills, night sweats.  Currently afebrile.  No leukocytosis.  UA with 300 protein, large blood, large leukocyte esterase, greater than 182 WBC, greater than 182 RBC. CT abdomen pelvis reports enhancement of the bladder wall which could indicate cystitis. In ED  patient was given 1 L NS bolus, Zofran 4 mg IV x1, Vantin 100 mg p.o. x1.  Previous urine cultures have grown E. coli, Enterococcus, Pseudomonas.  Last positive urine culture was 3/31/2021 with E. coli and was pansensitive.  She was started on Vantin 100 mg twice a day while on the observation unit.  Her urine culture is still pending.  We will continue to follow this.  No leukocytosis on morning labs.  She is urinating without difficulty.  -Continue Vantin 100 p.o. twice daily X 6 more days  -Follow-up on urine culture       ##. Hypokalemia: K2.9. In ED patient was given KCl 40 M EQ x1 p.o., KCl 10 M EQ IV x 1. Replace K per protocol. Hypokalemia resolved.   Today her potassium is 3.9.  She was instructed to follow-up with her primary care provider for a repeat basic metabolic panel in 5 to 7 days.  Her hypokalemia was likely secondary to poor p.o. intake.        ##. Covid19 Positive: Received Covid vaccines on 2/18/2021 and [id number] (Moderna).  Patient reports cough but thought this was related to her tobacco use.  Also reports significant fatigue though this was thought to be associated with thyroid disease and TSH abnormality.  Denies fever, chills, night sweats, chest pain, palpitations, headache, lightheadedness. CXR is clear.        ##.  Graves' disease s/p radioactive treatment   ##.  Hypothyroidism  Graves disease s/p I131 treatment February 2021, Patient was seen by PCP with TSH of 264 on 6/10/2021.  TSH 11.2 on 5/13/2021.  Patient's primary endocrinologist apparently is not available until 7/1/2021.  Looks like another Endocrinologists started levothyroxine on 6/10/2021 at 25 mcg daily and titrated up to 75 mcg daily which is patients currents dose with plan to repeat TSH in 2 months.  Also patient was on propanolol 10 mg 3 times daily, however, dose has been reduced to 10 mg twice daily for now.  TSH today is 191.68, free T4 0.67.  Discussed with endocrinology and they recommend she continue her  "Synthroid at 75 mcg daily.  Her primary endocrinologist is aware of her TSH of 191.  It will take some time for her TSH to normalize.  Endocrinology recommends that she follow-up with endocrinology in 4 weeks for repeat thyroid studies.  -Follow-up with endocrinologist in 4 weeks for repeat thyroid studies     ##. Essential hypertension: In ED, HR 64-96, -153/.  Takes hydrochlorothiazide 12.5 mg daily.  -Continue PTA hydrochlorothiazide     ##. GERD: - Continue with PTA Famotidine      ##. CKD III: BUN 13, creatinine 0.97.  Baseline appears to be about 0.8-1.1.     ##.  Advanced multiple sclerosis:  diagnosed 1993, manifested again predominantly by left-sided weakness w urinary incontinence ? Neurogenic bladder. Follows up with Dr. Redman for MS, at [facility]. Stable at current. Follows Neurology as outpatient. given prior history of treatment with Lemtrada, in [id number]. Prior to the Lemtrada, she was on beta interferon since 1993, for almost 20 years.    - Continue Valacyclovir 500 bid ppx, Oxybutynin.   - Outpatient follow-up with Neurology.      ##. Depression, anxiety: - Continue PTA Cymbalta      ##. Painful paresthesias: - Continue PTA gabapentin 600mg bid      ##. Neurogenic bladder: - Continue PTA oxybutynin 10mg every day bid  -Check postvoid residuals     ##. Tobacco Abuse: Quit in recently started smoking again.  Declines nicotine patch.               Final Day of Progress before Discharge:       Physical Exam:  Blood pressure 124/66, pulse 83, temperature 99.4  F (37.4  C), temperature source Oral, resp. rate 15, height 1.727 m (5' 8\"), weight 59 kg (130 lb), SpO2 95 %, not currently breastfeeding.    EXAM:  Exam:  Constitutional: healthy, alert and no distress  Head: Normocephalic. No masses, lesions, tenderness or abnormalities  Cardiovascular: No lifts, heaves, or thrills. RRR. No murmurs, clicks gallops or rub  Respiratory: Good diaphragmatic excursion. Lungs " "clear  Gastrointestinal: Abdomen soft, mild tenderness lower quadrants throughout, no guarding or rebound tenderness.  BS normal. No masses, organomegaly  Musculoskeletal: extremities normal- no gross deformities noted, and normal muscle tone  Skin: no suspicious lesions or rashes  Neurologic:Alert and oriented.   Psychiatric: mentation appears normal and affect normal/bright    /66   Pulse 83   Temp 99.4  F (37.4  C) (Oral)   Resp 15   Ht 1.727 m (5' 8\")   Wt 59 kg (130 lb)   LMP  (LMP Unknown)   SpO2 95%   Breastfeeding No   BMI 19.77 kg/m               Data:  All laboratory data reviewed             Significant Results:     Results for orders placed or performed during the hospital encounter of 06/30/21   Abdomen XR, 2 vw, flat and upright     Status: None    Narrative    EXAM: XR ABDOMEN 2VIEWS  6/30/2021 3:30 PM      HISTORY: abdominal pain, constipation    COMPARISON: Abdominal radiograph 6/10/2021    FINDINGS: Supine and upright abdominal radiographs. Cholecystectomy  clips. Large amount of stool, predominantly within the distal  descending and rectosigmoid colon. Gaseous distention of the colon at  the splenic flexure. No pneumatosis. No portal venous gas. Visualized  portions of the lung demonstrate no focal airspace opacities. Surgical  changes in the right hip.      Impression    IMPRESSION: Large colonic stool burden, predominantly involving the  distal colon. Gaseous distention of the splenic flexure.         I have personally reviewed the examination and initial interpretation  and I agree with the findings.    DORA VILLA MD          SYSTEM ID:  [id number]   CT Abdomen Pelvis w Contrast     Status: None    Narrative    EXAMINATION: CT ABDOMEN PELVIS W CONTRAST, 6/30/2021 7:22 PM    TECHNIQUE:  Helical CT images from the lung bases through the  symphysis pubis were obtained with IV contrast. Contrast dose: 80 mL    COMPARISON: CT abdomen and pelvis 6/9/2014    HISTORY: RLQ abdominal " pain, appendicitis suspected (Age >= 14y);  Bowel obstruction suspected    FINDINGS:    Liver: Normal parenchymal attenuation without focal mass. Minor focal  fatty change along the falciform ligament.  Biliary system: Postoperative changes of cholecystectomy. Mild  prominence of the common duct and central intrahepatic ducts  consistent with same. Duct tapers to the ampulla..  Pancreas: Atrophic appearance of the pancreas.  Stomach: Small sliding-type hiatal hernia.  Spleen: Within normal limits. Splenules in the hilum.  Adrenal glands: Within normal limits.  Kidneys: 2 subcentimeter right-sided simple appearing renal cysts.  Bilateral pelviectasis.  Bladder: Slight diffuse hyperenhancement of the bladder wall.  Reproductive organs: Within normal limits. Small amount of fluid  suspected in the vagina, possibly related to suspected urinary  incontinence.  Colon: Large colonic stool burden. There is some presacral/perirectal  stranding and fluid.  Appendix: Within normal limits as seen on coronal series 3 image 28.  Small Bowel: Within normal limits.  Lymph nodes: No intra-abdominal or pelvic lymphadenopathy.  Vasculature: Mild aortic atherosclerosis. No significant aneurysm or  stenosis.  Peritoneum: No intraabdominal free air. Trace ascites.    Lung bases: Minimal amount of bibasilar atelectasis.    Bones and soft tissues: No suspicious soft tissue mass or fluid  collection. 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 Postsurgical changes of right intertrochanteric  hip fixation, hardware appears intact. Transitional lumbosacral  vertebrae with partial lumbarization of S1. No suspicious osseous  lesion.      Impression    IMPRESSION:   1. Large colonic stool burden. Small amount of ascites demonstrated  with some presacral/perirectal edema and fluid. The adjacent wall of  the rectum does not appear significantly  thickened but this could be  correlated clinically with symptoms for stercoral colitis given rectal  stool burden.  2. No evidence of appendicitis or bowel obstruction as questioned.  3. Enhancement of the bladder wall which could indicate cystitis  suggest correlation with urinalysis.      I have personally reviewed the examination and initial interpretation  and I agree with the findings.    RICHARD MYERS MD          SYSTEM ID:  [id number]   XR Chest Port 1 View     Status: None    Narrative    XR CHEST PORT 1 VIEW  7/1/2021 8:24 AM      HISTORY: cough. covid 19 positive    COMPARISON: CT chest 6/6/2019.    FINDINGS:   Portable AP 60 degree upright chest x-ray.    Trachea is midline. Cardiac silhouette is within normal limits.  Pulmonary vasculature is distinct. No focal airspace opacity, pleural  effusion or pneumothorax. No focal consolidation.    No acute osseous abnormality. Visualized soft tissues and upper  abdomen are unremarkable.      Impression    IMPRESSION:   No focal airspace opacity.    I have personally reviewed the examination and initial interpretation  and I agree with the findings.    OMAR ROJAS MD          SYSTEM ID:  [id number]   CBC with platelets differential     Status: Abnormal   Result Value Ref Range    WBC 8.1 4.0 - 11.0 10e9/L    RBC Count 4.50 3.8 - 5.2 10e12/L    Hemoglobin 14.7 11.7 - 15.7 g/dL    Hematocrit 43.0 35.0 - 47.0 %    MCV 96 78 - 100 fl    MCH 32.7 26.5 - 33.0 pg    MCHC 34.2 31.5 - 36.5 g/dL    RDW 15.2 (H) 10.0 - 15.0 %    Platelet Count 194 150 - 450 10e9/L    Diff Method Automated Method     % Neutrophils 77.5 %    % Lymphocytes 16.6 %    % Monocytes 3.6 %    % Eosinophils 1.4 %    % Basophils 0.5 %    % Immature Granulocytes 0.4 %    Nucleated RBCs 0 0 /100    Absolute Neutrophil 6.3 1.6 - 8.3 10e9/L    Absolute Lymphocytes 1.4 0.8 - 5.3 10e9/L    Absolute Monocytes 0.3 0.0 - 1.3 10e9/L    Absolute Eosinophils 0.1 0.0 - 0.7 10e9/L    Absolute Basophils 0.0 0.0  - 0.2 10e9/L    Abs Immature Granulocytes 0.0 0 - 0.4 10e9/L    Absolute Nucleated RBC 0.0    Comprehensive metabolic panel     Status: Abnormal   Result Value Ref Range    Sodium 136 133 - 144 mmol/L    Potassium 2.9 (L) 3.4 - 5.3 mmol/L    Chloride 98 94 - 109 mmol/L    Carbon Dioxide 32 20 - 32 mmol/L    Anion Gap 6 3 - 14 mmol/L    Glucose 82 70 - 99 mg/dL    Urea Nitrogen 13 7 - 30 mg/dL    Creatinine 0.97 0.52 - 1.04 mg/dL    GFR Estimate 61 >60 mL/min/[1.73_m2]    GFR Estimate If Black 71 >60 mL/min/[1.73_m2]    Calcium 9.6 8.5 - 10.1 mg/dL    Bilirubin Total 0.7 0.2 - 1.3 mg/dL    Albumin 4.0 3.4 - 5.0 g/dL    Protein Total 8.9 (H) 6.8 - 8.8 g/dL    Alkaline Phosphatase 72 40 - 150 U/L    ALT 21 0 - 50 U/L    AST 26 0 - 45 U/L   UA reflex to Microscopic and Culture     Status: Abnormal    Specimen: Urine catheter; Catheterized Urine   Result Value Ref Range    Color Urine Yellow     Appearance Urine Slightly Cloudy     Glucose Urine Negative NEG^Negative mg/dL    Bilirubin Urine Negative NEG^Negative    Ketones Urine Negative NEG^Negative mg/dL    Specific Gravity Urine 1.010 1.003 - 1.035    Blood Urine Large (A) NEG^Negative    pH Urine 7.0 5.0 - 7.0 pH    Protein Albumin Urine 300 (A) NEG^Negative mg/dL    Urobilinogen mg/dL Normal 0.0 - 2.0 mg/dL    Nitrite Urine Negative NEG^Negative    Leukocyte Esterase Urine Large (A) NEG^Negative    Source Catheterized Urine     RBC Urine >182 (H) 0 - 2 /HPF    WBC Urine >182 (H) 0 - 5 /HPF    Bacteria Urine Few (A) NEG^Negative /HPF    Squamous Epithelial /HPF Urine 0 0 - 1 /HPF   TSH with free T4 reflex     Status: Abnormal   Result Value Ref Range    .68 (H) 0.40 - 4.00 mU/L   Lipase     Status: Abnormal   Result Value Ref Range    Lipase 60 (L) 73 - 393 U/L   T4 free     Status: Abnormal   Result Value Ref Range    T4 Free 0.67 (L) 0.76 - 1.46 ng/dL   Magnesium     Status: None   Result Value Ref Range    Magnesium 1.9 1.6 - 2.3 mg/dL   Phosphorus      Status: None   Result Value Ref Range    Phosphorus 3.8 2.5 - 4.5 mg/dL   Asymptomatic SARS-CoV-2 COVID-19 Virus (Coronavirus) by PCR     Status: Abnormal    Specimen: Nasopharyngeal   Result Value Ref Range    SARS-CoV-2 Virus Specimen Source Nasopharyngeal     SARS-CoV-2 PCR Result POSITIVE (AA)     SARS-CoV-2 PCR Comment       Testing was performed using the Xpert Xpress SARS-CoV-2 Assay on the Cepheid Gene-Xpert   Instrument Systems. Additional information about this Emergency Use Authorization (EUA)   assay can be found via the Lab Guide.     Erythrocyte sedimentation rate auto     Status: None   Result Value Ref Range    Sed Rate 17 0 - 30 mm/h   CRP inflammation     Status: None   Result Value Ref Range    CRP Inflammation <2.9 0.0 - 8.0 mg/L   CBC with platelets differential     Status: Abnormal   Result Value Ref Range    WBC 10.4 4.0 - 11.0 10e9/L    RBC Count 3.69 (L) 3.8 - 5.2 10e12/L    Hemoglobin 12.2 11.7 - 15.7 g/dL    Hematocrit 34.1 (L) 35.0 - 47.0 %    MCV 92 78 - 100 fl    MCH 33.1 (H) 26.5 - 33.0 pg    MCHC 35.8 31.5 - 36.5 g/dL    RDW 15.4 (H) 10.0 - 15.0 %    Platelet Count 149 (L) 150 - 450 10e9/L    Diff Method Automated Method     % Neutrophils 84.8 %    % Lymphocytes 9.2 %    % Monocytes 3.4 %    % Eosinophils 1.4 %    % Basophils 0.4 %    % Immature Granulocytes 0.8 %    Nucleated RBCs 0 0 /100    Absolute Neutrophil 8.8 (H) 1.6 - 8.3 10e9/L    Absolute Lymphocytes 1.0 0.8 - 5.3 10e9/L    Absolute Monocytes 0.4 0.0 - 1.3 10e9/L    Absolute Eosinophils 0.2 0.0 - 0.7 10e9/L    Absolute Basophils 0.0 0.0 - 0.2 10e9/L    Abs Immature Granulocytes 0.1 0 - 0.4 10e9/L    Absolute Nucleated RBC 0.0    Comprehensive metabolic panel     Status: Abnormal   Result Value Ref Range    Sodium 139 133 - 144 mmol/L    Potassium 4.4 3.4 - 5.3 mmol/L    Chloride 109 94 - 109 mmol/L    Carbon Dioxide 23 20 - 32 mmol/L    Anion Gap 7 3 - 14 mmol/L    Glucose 78 70 - 99 mg/dL    Urea Nitrogen 12 7 - 30 mg/dL     Creatinine 0.88 0.52 - 1.04 mg/dL    GFR Estimate 69 >60 mL/min/[1.73_m2]    GFR Estimate If Black 80 >60 mL/min/[1.73_m2]    Calcium 8.3 (L) 8.5 - 10.1 mg/dL    Bilirubin Total 0.8 0.2 - 1.3 mg/dL    Albumin 3.0 (L) 3.4 - 5.0 g/dL    Protein Total 6.8 6.8 - 8.8 g/dL    Alkaline Phosphatase 58 40 - 150 U/L    ALT 16 0 - 50 U/L    AST 33 0 - 45 U/L   Magnesium     Status: None   Result Value Ref Range    Magnesium 1.8 1.6 - 2.3 mg/dL   Potassium     Status: None   Result Value Ref Range    Potassium 3.9 3.4 - 5.3 mmol/L   CBC with platelets differential     Status: Abnormal   Result Value Ref Range    WBC 9.8 4.0 - 11.0 10e9/L    RBC Count 3.77 (L) 3.8 - 5.2 10e12/L    Hemoglobin 12.6 11.7 - 15.7 g/dL    Hematocrit 36.6 35.0 - 47.0 %    MCV 97 78 - 100 fl    MCH 33.4 (H) 26.5 - 33.0 pg    MCHC 34.4 31.5 - 36.5 g/dL    RDW 15.3 (H) 10.0 - 15.0 %    Platelet Count 164 150 - 450 10e9/L    Diff Method Automated Method     % Neutrophils 83.8 %    % Lymphocytes 9.6 %    % Monocytes 3.5 %    % Eosinophils 2.1 %    % Basophils 0.5 %    % Immature Granulocytes 0.5 %    Nucleated RBCs 0 0 /100    Absolute Neutrophil 8.2 1.6 - 8.3 10e9/L    Absolute Lymphocytes 0.9 0.8 - 5.3 10e9/L    Absolute Monocytes 0.3 0.0 - 1.3 10e9/L    Absolute Eosinophils 0.2 0.0 - 0.7 10e9/L    Absolute Basophils 0.1 0.0 - 0.2 10e9/L    Abs Immature Granulocytes 0.1 0 - 0.4 10e9/L    Absolute Nucleated RBC 0.0    Comprehensive metabolic panel     Status: Abnormal   Result Value Ref Range    Sodium 139 133 - 144 mmol/L    Potassium 3.9 3.4 - 5.3 mmol/L    Chloride 104 94 - 109 mmol/L    Carbon Dioxide 25 20 - 32 mmol/L    Anion Gap 9 3 - 14 mmol/L    Glucose 57 (L) 70 - 99 mg/dL    Urea Nitrogen 25 7 - 30 mg/dL    Creatinine 0.96 0.52 - 1.04 mg/dL    GFR Estimate 62 >60 mL/min/[1.73_m2]    GFR Estimate If Black 71 >60 mL/min/[1.73_m2]    Calcium 8.8 8.5 - 10.1 mg/dL    Bilirubin Total 0.7 0.2 - 1.3 mg/dL    Albumin 3.4 3.4 - 5.0 g/dL    Protein  Total 7.2 6.8 - 8.8 g/dL    Alkaline Phosphatase 64 40 - 150 U/L    ALT 19 0 - 50 U/L    AST 30 0 - 45 U/L   Urine Culture     Status: None (Preliminary result)    Specimen: Urine clean catch; Catheterized Urine   Result Value Ref Range    Specimen Description Catheterized Urine     Special Requests Specimen received in preservative     Culture Micro Culture in progress       Recent Results (from the past 48 hour(s))   Abdomen XR, 2 vw, flat and upright    Narrative    EXAM: XR ABDOMEN 2VIEWS  6/30/2021 3:30 PM      HISTORY: abdominal pain, constipation    COMPARISON: Abdominal radiograph 6/10/2021    FINDINGS: Supine and upright abdominal radiographs. Cholecystectomy  clips. Large amount of stool, predominantly within the distal  descending and rectosigmoid colon. Gaseous distention of the colon at  the splenic flexure. No pneumatosis. No portal venous gas. Visualized  portions of the lung demonstrate no focal airspace opacities. Surgical  changes in the right hip.      Impression    IMPRESSION: Large colonic stool burden, predominantly involving the  distal colon. Gaseous distention of the splenic flexure.         I have personally reviewed the examination and initial interpretation  and I agree with the findings.    DORA VILLA MD          SYSTEM ID:  [id number]   CT Abdomen Pelvis w Contrast    Narrative    EXAMINATION: CT ABDOMEN PELVIS W CONTRAST, 6/30/2021 7:22 PM    TECHNIQUE:  Helical CT images from the lung bases through the  symphysis pubis were obtained with IV contrast. Contrast dose: 80 mL    COMPARISON: CT abdomen and pelvis 6/9/2014    HISTORY: RLQ abdominal pain, appendicitis suspected (Age >= 14y);  Bowel obstruction suspected    FINDINGS:    Liver: Normal parenchymal attenuation without focal mass. Minor focal  fatty change along the falciform ligament.  Biliary system: Postoperative changes of cholecystectomy. Mild  prominence of the common duct and central intrahepatic ducts  consistent with  same. Duct tapers to the ampulla..  Pancreas: Atrophic appearance of the pancreas.  Stomach: Small sliding-type hiatal hernia.  Spleen: Within normal limits. Splenules in the hilum.  Adrenal glands: Within normal limits.  Kidneys: 2 subcentimeter right-sided simple appearing renal cysts.  Bilateral pelviectasis.  Bladder: Slight diffuse hyperenhancement of the bladder wall.  Reproductive organs: Within normal limits. Small amount of fluid  suspected in the vagina, possibly related to suspected urinary  incontinence.  Colon: Large colonic stool burden. There is some presacral/perirectal  stranding and fluid.  Appendix: Within normal limits as seen on coronal series 3 image 28.  Small Bowel: Within normal limits.  Lymph nodes: No intra-abdominal or pelvic lymphadenopathy.  Vasculature: Mild aortic atherosclerosis. No significant aneurysm or  stenosis.  Peritoneum: No intraabdominal free air. Trace ascites.    Lung bases: Minimal amount of bibasilar atelectasis.    Bones and soft tissues: No suspicious soft tissue mass or fluid  collection. 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 Postsurgical changes of right intertrochanteric  hip fixation, hardware appears intact. Transitional lumbosacral  vertebrae with partial lumbarization of S1. No suspicious osseous  lesion.      Impression    IMPRESSION:   1. Large colonic stool burden. Small amount of ascites demonstrated  with some presacral/perirectal edema and fluid. The adjacent wall of  the rectum does not appear significantly thickened but this could be  correlated clinically with symptoms for stercoral colitis given rectal  stool burden.  2. No evidence of appendicitis or bowel obstruction as questioned.  3. Enhancement of the bladder wall which could indicate cystitis  suggest correlation with urinalysis.      I have personally reviewed the examination and initial  interpretation  and I agree with the findings.    RICHARD MYERS MD          SYSTEM ID:  [id number]   XR Chest Port 1 View    Narrative    XR CHEST PORT 1 VIEW  7/1/2021 8:24 AM      HISTORY: cough. covid 19 positive    COMPARISON: CT chest 6/6/2019.    FINDINGS:   Portable AP 60 degree upright chest x-ray.    Trachea is midline. Cardiac silhouette is within normal limits.  Pulmonary vasculature is distinct. No focal airspace opacity, pleural  effusion or pneumothorax. No focal consolidation.    No acute osseous abnormality. Visualized soft tissues and upper  abdomen are unremarkable.      Impression    IMPRESSION:   No focal airspace opacity.    I have personally reviewed the examination and initial interpretation  and I agree with the findings.    OMAR ROJAS MD          SYSTEM ID:  [id number]                Pending Results:   Unresulted Labs Ordered in the Past 30 Days of this Admission     Date and Time Order Name Status Description    7/1/2021 0505 Urine Culture Preliminary                   Discharge Instructions and Follow-Up:     Discharge Procedure Orders   Reason for your hospital stay   Order Comments: You were admitted to the ER observation unit for constipation.  Your CT scan showed a large amount of stool.  But it did not show a bowel obstruction.  You were started on an aggressive bowel regimen.  You began to have moderate sized soft stools.  You were able to eat.  You were discharged home on the new bowel regimen of MiraLAX twice a day and senna once a day.  You can decrease these amounts as needed at home.  Your urinalysis also showed a possible bladder infection.  You were started on antibiotics for this.  Your urine culture is pending.  You will be called if your antibiotics need to be changed.  Your TSH was 191.  Continue to take your Synthroid 75 mcg/day.  Follow-up with your endocrinologist.  You will need repeat thyroid studies in 4 to 6 weeks.  Your potassium was low in the emergency room,  you were given potassium replacement.  Your potassium was normal this morning.  Please follow-up with your primary care provider in 5 days to have your potassium rechecked.     Adult [facility] Follow-up and recommended labs and tests   Order Comments: Follow up with primary care provider, Carolina Pulliam, within 7 days for hospital follow- up.  The following labs/tests are recommended: BMP.      Appointments on [facility] and/or [facility] (with [facility] or [facility] provider or service). Call [phone] if you haven't heard regarding these appointments within 7 days of discharge.     Activity   Order Comments: Your activity upon discharge: activity as tolerated     Order Specific Question Answer Comments   Is discharge order? Yes      When to contact your care team   Order Comments: Return to the ER if you have worsening abdominal pain, fever, vomiting, blood in stool or vomit, or worsening constipation.     Diet   Order Comments: Follow this diet upon discharge: Orders Placed This Encounter      Regular Diet Adult     Order Specific Question Answer Comments   Is discharge order? Yes           Attestation:  SWAPNIL Pham CNP.

## 2021-07-02 NOTE — PROGRESS NOTES
Marylee Woods is a 66 year old female patient.  1. Constipation, unspecified constipation type    2. Acute cystitis without hematuria      Past Medical History:   Diagnosis Date     Gastro-oesophageal reflux disease      Multiple sclerosis (H)      Current Outpatient Medications   Medication Sig Dispense Refill     acetaminophen (TYLENOL) 500 MG tablet Take 1-2 tablets (500-1,000 mg) by mouth every 8 hours as needed for mild pain or fever (greater than 101 degrees)       alendronate (FOSAMAX) 70 MG tablet Take 70 mg by mouth every 7 days On Saturdays       baclofen (LIORESAL) 10 MG tablet Take 2 tablets (20 MG) by mouth every morning, 1 tablet (10 MG) by mouth daily in the afternoon as needed, and 3 tablets (30 MG) by mouth every evening before bedtime.       cholecalciferol (VITAMIN D3) 5000 units CAPS capsule Take 7,000 Units by mouth daily        DULoxetine (CYMBALTA) 60 MG capsule TAKE 1 CAPSULE BY MOUTH EVERY DAY 90 capsule 0     famotidine (PEPCID) 40 MG tablet TAKE 1 TABLET BY MOUTH 2 TIMES DAILY 180 tablet 2     ferrous sulfate (IRON) 325 (65 Fe) MG tablet Take 1 tablet (325 mg) by mouth daily 100 tablet      fish oil-omega-3 fatty acids 1000 MG capsule Take 1 g by mouth daily       gabapentin (NEURONTIN) 300 MG capsule Take 2 tablets (600 MG) by mouth 3-4 times daily.       hydrochlorothiazide (HYDRODIURIL) 12.5 MG tablet TAKE 1 TABLET BY MOUTH EVERY DAY 90 tablet 0     latanoprost (XALATAN) 0.005 % ophthalmic solution INSTILL 1 DROP INTO BOTH EYES EVERY DAY AS DIRECTED PT WILL NEED TO BE SEEN FOR FUTURE REFILLS       levothyroxine (SYNTHROID/LEVOTHROID) 25 MCG tablet Pt take 1 tablet daily for 1 week, then 2 tablets a day for 1 week, then 3 tablets daily 270 tablet 1     multivitamin, therapeutic with minerals (THERA-VIT-M) TABS Take 1 tablet by mouth daily.       NICOTROL 10 MG inhaler Inhale 1 Cartridge into the lungs daily as needed for smoking cessation   1     oxybutynin (DITROPAN-XL) 10 MG 24 hr  "tablet Take 1 tablet (10 mg) by mouth 2 times daily 30 tablet      polyethylene glycol (MIRALAX) 17 GM/Dose powder Take 1 capful by mouth daily       propranolol (INDERAL) 10 MG tablet Take 1 tablet (10 mg) by mouth 2 times daily 270 tablet 3     valACYclovir (VALTREX) 500 MG tablet Take 1 tablet (500 mg) by mouth 2 times daily 12 tablet 5     Allergies   Allergen Reactions     Amantadine      hallucinations     Budesonide      Symbicort Rash     Active Problems:    Constipation    Acute cystitis without hematuria    Blood pressure 120/74, pulse 90, temperature 99.4  F (37.4  C), temperature source Oral, resp. rate 15, height 1.727 m (5' 8\"), weight 59 kg (130 lb), SpO2 95 %, not currently breastfeeding.    Subjective:  Symptoms:  Improved.  (Constipation).    Diet:  Poor intake.    Activity level: Normal.    Pain:  She complains of pain that is mild.  She reports pain is improving.  Pain is well controlled.      Objective:  General Appearance:  Comfortable.    Vital signs: (most recent): Blood pressure 120/74, pulse 90, temperature 99.4  F (37.4  C), temperature source Oral, resp. rate 15, height 1.727 m (5' 8\"), weight 59 kg (130 lb), SpO2 95 %, not currently breastfeeding.  Vital signs are normal.    Output: Producing urine and producing stool.    HEENT: Normal HEENT exam.    Lungs:  Normal effort and normal respiratory rate.  Breath sounds clear to auscultation.    Heart: Normal rate.  Regular rhythm.  S1 normal and S2 normal.    Abdomen: Abdomen is soft.  Bowel sounds are normal.   There is no abdominal tenderness.     Extremities: Normal range of motion.    Pulses: Distal pulses are intact.    Neurological: Patient is alert.    Pupils:  Pupils are equal, round, and reactive to light.    Skin:  Warm.      Assessment:    Condition: In stable condition.  Improving.   (66 yof with MS Graves HTN CRF presents with recurrent constipation-improving with golytley. Advance diet and if tolerate, plan to discharge with " regular bowel regimen.    Hypokalemia resolved, on hydrochlorothiazide-probably need regular supplement K.).     The pt was seen and examined by myself. The case was reviewed and the plan was discussed with the ABHISHEK.    Ene Stephenson MD, MD  7/2/2021

## 2021-07-05 ENCOUNTER — TELEPHONE (OUTPATIENT)
Dept: FAMILY MEDICINE | Facility: CLINIC | Age: 66
End: 2021-07-05

## 2021-07-05 NOTE — TELEPHONE ENCOUNTER
ED / Discharge Outreach Protocol    Patient Contact    Attempt # 1    Was call answered?  No.  Left message on voicemail with information to call triage back.    SAEID LowN, RN  [facility]

## 2021-07-07 ENCOUNTER — TELEPHONE (OUTPATIENT)
Dept: ENDOCRINOLOGY | Facility: CLINIC | Age: 66
End: 2021-07-07

## 2021-07-07 NOTE — TELEPHONE ENCOUNTER
Patient called. C/o constipation. Advised to continue to take 75 mcg daily, as the thyroid hormone levels are going to gradually improve.

## 2021-07-07 NOTE — TELEPHONE ENCOUNTER
Pt calling back for her hospital follow up -  DR Montserrat TURK for pt hospital follow up appointment on Friday    Pt doing ok at home- Not in as much pain as she was when she went into the ER-  Pt still hasn't pooped since she has been home from the hospital-She has miralax and senna and not working(per hosp rx)  I did advise she try a glycerin suppository tonight and if that if no results- try a fleets enema    In the hospital they tried an enema- and mag citrate  Then gave a small bottle of magnesium again the next day- then she finally started to poop   Now home and hasn't pooped again.  Wondering if this slow transit could be r/t her MS- ? Or her under active thyroid?  Writer advised pt to call her endo and inquire about a dose adjustment  Has always had issues with this- but lately it seems worse. Had thyroid radiated a few months ago  TSH   Date Value Ref Range Status   06/30/2021 191.68 (H) 0.40 - 4.00 mU/L Final       Pt said when she went to the ER she hadn't pooped in 21 days-   Has plan in place for tonight and follow up appointment scheduled Friday with dr root.  Follow up with me via my chart and/or call me.       Jazzy Hernandez RN

## 2021-07-07 NOTE — TELEPHONE ENCOUNTER
Health Call Center    Phone Message    May a detailed message be left on voicemail: yes     Reason for Call: Medication Question or concern regarding medication   Prescription Clarification    Name of Medication:   * levothyroxine (SYNTHROID/LEVOTHROID) 25 MCG tablet    Prescribing Provider: Vikas     Pharmacy: n/a     What on the order needs clarification? Patient had Thyroid Radiated, Patient is on Synthroid 75mcg a day. Patient states has an appt with Dr. Bean 8/16/2021. Patient states just got out of the hospital due to no bowel movement in 21 days. Patient talked to regular doctors for Hospital Follow up. Patient states has been home 6 days, and has been following regimen recommended by Hospital and has not had a bowel movement in 6 days, Patient states thyroid(TSH) is still going down and it is at 191. Patient states the doctors thinks needing to get it down quicker as it is contributing to not having a bowel movement.  Please advise.       Action Taken: Message routed to:  [facility] Endo    Travel Screening: Not Applicable

## 2021-07-08 NOTE — PROGRESS NOTES
"    Assessment & Plan     Hypokalemia  K was 2.9 in the ER which she attributes to lack of eating. Will recheck today.   - Basic metabolic panel  (Ca, Cl, CO2, Creat, Gluc, K, Na, BUN)    Constipation, unspecified constipation type  See patient instructions below for plan on constipation.     Hospital discharge follow-up    MS (multiple sclerosis) (H)    Postablative hypothyroidism    Constipation likely due to MS, hypothyroidism. Advised she discuss as well with neurology during her next MS follow up. We will try regimen listed below and if unsuccessful, plan on referral to GI.     Patient Instructions   For your constipation, do the following:  -Miralax twice daily  -Docusate (colace) 2  caps per day  - Senna 1 capsule daily  -magnesium citrate, Calm magnesium supplement (you can mix this with miralax)  -consider adding a probiotic  -drink lots of water water  -Consider adding prunes 3-5 daily    Tonight, try a fleet enema at home.     If this doesn't work, you can do a \"Clean Out\".   Drink 1 whole bottle 510 grams (30 dose) of Miralax mixed in 64 ounces of gatorade in one 12 hour period.  This will be a lot of pooping!  Stay at home near a bathroom.   Once this day is over, start 1-2 capfuls of miralax daily so that you have 1-2 soft poops every day.   Lt us know if this doesn't work!        Rita Hicks MD  [facility]    Subjective Marylee is a 66 year old who presents for the following health issues  accompanied by her spouse:    HPI     ED/UC Followup:    Facility:  [facility] Emergency Department  Date of visit: 06/30/2021  Reason for visit: Constipation  Current Status: still constipated, since she got out of the hospital a week ago she has only had a small bowel. She took some suppositories Wed 6/7  but have not helped much only had a small bowel movement. Has not been doing the suppositories since wed. She has stomach pain on the right side and has a lot of " gas. Has been drinking a lot of water. Takes miralax once a day.     Went to the ED as she hadn't had a BM in over 20 days. She was having significant abdominal pain, especially RLQ. This feels better although still has generalized tenderness. Work up included negative CT A/P and rule out for SBO, appendicities. She was given an enema, magnesium citrate and had a BM. Told to take miralax twice daily at home.     She hasn't had a large bowel movement since she returned home on 7/1. She tried a suppository at home and had a very small BM the following morning. She didn't try any enemas. She has been doing two miralax per day and a Senna daily. She also took 3 colace on Wednesday. Appetite has been good. No nausea or vomiting.     She took the prescribed antibiotics for UTI and does not have urinary symptoms, fever, chills.     Also found to have hypokalemia in the hospital. She attributes this to poor appetite then.    H/o thyroid radiation 2/9/2021 for Graves. She recently started Synthroid and increased the dose to 75 mcg daily 2 weeks ago. She will have a recheck in August through endocrine.    She also had MS diagnosed in her 20s. Not currently on any DMARDs. Sees Dr. Redman at [facility] in [place]. Due to be seen.  Does not have an appt scheduled yet.     Constipation has been an issue for her lifelong. Recalls when she was a teen she used to go only once a month. In recent years, she has had periods of time where it was 1-2 times daily but it was also normal for her to go only once a week. Has been like this leading up to her recent ED visit.       Objective    /70 (BP Location: Right arm, Patient Position: Sitting, Cuff Size: Adult Regular)   Pulse 74   Temp 97.3  F (36.3  C) (Temporal)   Resp 16   LMP  (LMP Unknown)   SpO2 100%   There is no height or weight on file to calculate BMI.  Physical Exam   GENERAL: healthy, alert and no distress  EYES: Eyes grossly normal to  inspection, PERRL and conjunctivae and sclerae normal  RESP: lungs clear to auscultation - no rales, rhonchi or wheezes  CV: regular rate and rhythm, normal S1 S2, no S3 or S4, no murmur, click or rub, no peripheral edema and peripheral pulses strong  ABDOMEN: soft, mild tenderness throughout, normal bowel sounds, no masses  MS: no gross musculoskeletal defects noted, no edema  SKIN: no suspicious lesions or rashes  PSYCH: mentation appears normal, affect normal/bright

## 2021-07-09 ENCOUNTER — OFFICE VISIT (OUTPATIENT)
Dept: FAMILY MEDICINE | Facility: CLINIC | Age: 66
End: 2021-07-09
Payer: MEDICARE

## 2021-07-09 VITALS
OXYGEN SATURATION: 100 % | SYSTOLIC BLOOD PRESSURE: 126 MMHG | RESPIRATION RATE: 16 BRPM | DIASTOLIC BLOOD PRESSURE: 70 MMHG | HEART RATE: 74 BPM | TEMPERATURE: 97.3 F

## 2021-07-09 DIAGNOSIS — G35 MS (MULTIPLE SCLEROSIS) (H): ICD-10-CM

## 2021-07-09 DIAGNOSIS — E89.0 POSTABLATIVE HYPOTHYROIDISM: ICD-10-CM

## 2021-07-09 DIAGNOSIS — K59.00 CONSTIPATION, UNSPECIFIED CONSTIPATION TYPE: Primary | ICD-10-CM

## 2021-07-09 DIAGNOSIS — Z09 HOSPITAL DISCHARGE FOLLOW-UP: ICD-10-CM

## 2021-07-09 DIAGNOSIS — E87.6 HYPOKALEMIA: ICD-10-CM

## 2021-07-09 PROCEDURE — 99214 OFFICE O/P EST MOD 30 MIN: CPT | Performed by: STUDENT IN AN ORGANIZED HEALTH CARE EDUCATION/TRAINING PROGRAM

## 2021-07-09 PROCEDURE — 36415 COLL VENOUS BLD VENIPUNCTURE: CPT | Performed by: STUDENT IN AN ORGANIZED HEALTH CARE EDUCATION/TRAINING PROGRAM

## 2021-07-09 PROCEDURE — 80048 BASIC METABOLIC PNL TOTAL CA: CPT | Performed by: STUDENT IN AN ORGANIZED HEALTH CARE EDUCATION/TRAINING PROGRAM

## 2021-07-09 NOTE — PATIENT INSTRUCTIONS
"For your constipation, do the following:  -Miralax twice daily  -Docusate (colace) 2  caps per day  - Senna 1 capsule daily  -magnesium citrate, Calm magnesium supplement (you can mix this with miralax)  -consider adding a probiotic  -drink lots of water water  -Consider adding prunes 3-5 daily    Tonight, try a fleet enema at home.     If this doesn't work, you can do a \"Clean Out\".   Drink 1 whole bottle 510 grams (30 dose) of Miralax mixed in 64 ounces of gatorade in one 12 hour period.  This will be a lot of pooping!  Stay at home near a bathroom.   Once this day is over, start 1-2 capfuls of miralax daily so that you have 1-2 soft poops every day.   Lt us know if this doesn't work!    "

## 2021-07-10 LAB
ANION GAP SERPL CALCULATED.3IONS-SCNC: <1 MMOL/L (ref 3–14)
BUN SERPL-MCNC: 9 MG/DL (ref 7–30)
CALCIUM SERPL-MCNC: 9.5 MG/DL (ref 8.5–10.1)
CHLORIDE SERPL-SCNC: 102 MMOL/L (ref 94–109)
CO2 SERPL-SCNC: 34 MMOL/L (ref 20–32)
CREAT SERPL-MCNC: 0.98 MG/DL (ref 0.52–1.04)
GFR SERPL CREATININE-BSD FRML MDRD: 60 ML/MIN/{1.73_M2}
GLUCOSE SERPL-MCNC: 90 MG/DL (ref 70–99)
POTASSIUM SERPL-SCNC: 4.5 MMOL/L (ref 3.4–5.3)
SODIUM SERPL-SCNC: 136 MMOL/L (ref 133–144)

## 2021-08-10 ENCOUNTER — TELEPHONE (OUTPATIENT)
Dept: FAMILY MEDICINE | Facility: CLINIC | Age: 66
End: 2021-08-10

## 2021-08-10 DIAGNOSIS — R39.9 URINARY SYMPTOM OR SIGN: Primary | ICD-10-CM

## 2021-08-10 NOTE — TELEPHONE ENCOUNTER
Dr. Pulliam-  1. Patient is requesting a standing order for UA/UC  2. Call received from patient and , Manuel, asking to drop off urine sample today to see if she has UTI   A. Reviewed options for evaluation and lab order: virtual visit, in-person visit, Urgent Care or eVisit  3. Patient and  declined evaluation options.   began yelling at writer over inconvenience and cost of visit   A. Patient used to have standing order for UA/UC, per patient and    B. Reviewed with patient and spouse, writer does not see a standing order for UA/UC    Patient and  informed message will be sent to Dr. Pulliam, who is not in clinic today but in clinic tomorrow.    Patient declined triage.    Writer encouraged patient to discuss UA/UC plan with PCP and offered to schedule a visit with PCP.  Patient declined.    Thank you!  SAEID LowN, RN  [facility]

## 2021-08-11 ENCOUNTER — E-VISIT (OUTPATIENT)
Dept: FAMILY MEDICINE | Facility: CLINIC | Age: 66
End: 2021-08-11
Payer: MEDICARE

## 2021-08-11 DIAGNOSIS — N39.0 ACUTE UTI (URINARY TRACT INFECTION): ICD-10-CM

## 2021-08-11 DIAGNOSIS — R30.0 DYSURIA: Primary | ICD-10-CM

## 2021-08-11 PROCEDURE — 99422 OL DIG E/M SVC 11-20 MIN: CPT | Performed by: FAMILY MEDICINE

## 2021-08-11 NOTE — TELEPHONE ENCOUNTER
Unfortunately she does need a visit for this. Can be E-visit which is our cheapest option and I have offered patient in past.  I've discussed this with family before.  In agreement with your recommendations above.  Dr. Carolina Pulliam MD / [facility]

## 2021-08-11 NOTE — TELEPHONE ENCOUNTER
Left message to call back and ask to speak with an available triage nurse.  SAEID LowN, RN  [facility]

## 2021-08-11 NOTE — TELEPHONE ENCOUNTER
Pt called back - notified her of provider's message. Pt verbalized understanding.    Shaylee Quezada RN  [facility]

## 2021-08-11 NOTE — TELEPHONE ENCOUNTER
Provider E-Visit time total (minutes):     In June - in hospital with UA c/w infection. Reviewed prior cultures. H/o e coli. With resistence to ampicillin. Will treat with cefpodoxime. No e/o pyelo.  Culture was susceptible to all cephalosporins.  Streptococcus agalactiae sero group B   This organism is susceptible to ampicillin, penicillin, vancomycin and the cephalosporins.    If treatment is required AND your patient is allergic to penicillin, contact the   Microbiology Lab within 5 days to request susceptibility testing.       In April - thought had Urinary Tract Infection, was negative.

## 2021-08-12 ENCOUNTER — LAB (OUTPATIENT)
Dept: LAB | Facility: CLINIC | Age: 66
End: 2021-08-12
Attending: INTERNAL MEDICINE
Payer: MEDICARE

## 2021-08-12 DIAGNOSIS — R30.0 DYSURIA: ICD-10-CM

## 2021-08-12 DIAGNOSIS — M81.0 OSTEOPOROSIS OF MULTIPLE SITES: ICD-10-CM

## 2021-08-12 DIAGNOSIS — E05.00 GRAVES' DISEASE: ICD-10-CM

## 2021-08-12 LAB
ALBUMIN SERPL-MCNC: 3.6 G/DL (ref 3.4–5)
ALBUMIN UR-MCNC: NEGATIVE MG/DL
ANION GAP SERPL CALCULATED.3IONS-SCNC: 1 MMOL/L (ref 3–14)
APPEARANCE UR: ABNORMAL
BACTERIA #/AREA URNS HPF: ABNORMAL /HPF
BILIRUB UR QL STRIP: NEGATIVE
BUN SERPL-MCNC: 15 MG/DL (ref 7–30)
CALCIUM SERPL-MCNC: 9.5 MG/DL (ref 8.5–10.1)
CHLORIDE BLD-SCNC: 102 MMOL/L (ref 94–109)
CO2 SERPL-SCNC: 34 MMOL/L (ref 20–32)
COLOR UR AUTO: YELLOW
CREAT SERPL-MCNC: 1.08 MG/DL (ref 0.52–1.04)
GFR SERPL CREATININE-BSD FRML MDRD: 54 ML/MIN/1.73M2
GLUCOSE BLD-MCNC: 80 MG/DL (ref 70–99)
GLUCOSE UR STRIP-MCNC: NEGATIVE MG/DL
HGB UR QL STRIP: ABNORMAL
HYALINE CASTS: 3 /LPF
KETONES UR STRIP-MCNC: NEGATIVE MG/DL
LEUKOCYTE ESTERASE UR QL STRIP: ABNORMAL
NITRATE UR QL: NEGATIVE
PH UR STRIP: 5 [PH] (ref 5–7)
PHOSPHATE SERPL-MCNC: 3.4 MG/DL (ref 2.5–4.5)
POTASSIUM BLD-SCNC: 3.8 MMOL/L (ref 3.4–5.3)
PTH-INTACT SERPL-MCNC: 96 PG/ML (ref 18–80)
RBC URINE: 16 /HPF
SODIUM SERPL-SCNC: 137 MMOL/L (ref 133–144)
SP GR UR STRIP: 1.01 (ref 1–1.03)
SQUAMOUS EPITHELIAL: 2 /HPF
T4 FREE SERPL-MCNC: 1.18 NG/DL (ref 0.76–1.46)
TSH SERPL DL<=0.005 MIU/L-ACNC: 13.67 MU/L (ref 0.4–4)
UROBILINOGEN UR STRIP-MCNC: NORMAL MG/DL
WBC CLUMPS #/AREA URNS HPF: PRESENT /HPF
WBC URINE: >182 /HPF

## 2021-08-12 PROCEDURE — 84439 ASSAY OF FREE THYROXINE: CPT | Performed by: PATHOLOGY

## 2021-08-12 PROCEDURE — 83970 ASSAY OF PARATHORMONE: CPT | Performed by: INTERNAL MEDICINE

## 2021-08-12 PROCEDURE — 87086 URINE CULTURE/COLONY COUNT: CPT | Performed by: FAMILY MEDICINE

## 2021-08-12 PROCEDURE — 81001 URINALYSIS AUTO W/SCOPE: CPT | Performed by: PATHOLOGY

## 2021-08-12 PROCEDURE — 82306 VITAMIN D 25 HYDROXY: CPT | Performed by: INTERNAL MEDICINE

## 2021-08-12 PROCEDURE — 84080 ASSAY ALKALINE PHOSPHATASES: CPT | Mod: 90 | Performed by: PATHOLOGY

## 2021-08-12 PROCEDURE — 36415 COLL VENOUS BLD VENIPUNCTURE: CPT | Performed by: PATHOLOGY

## 2021-08-12 PROCEDURE — 84443 ASSAY THYROID STIM HORMONE: CPT | Performed by: PATHOLOGY

## 2021-08-12 PROCEDURE — 80069 RENAL FUNCTION PANEL: CPT | Performed by: PATHOLOGY

## 2021-08-12 RX ORDER — AMOXICILLIN AND CLAVULANATE POTASSIUM 500; 125 MG/1; MG/1
1 TABLET, FILM COATED ORAL 2 TIMES DAILY
Qty: 14 TABLET | Refills: 0 | Status: SHIPPED | OUTPATIENT
Start: 2021-08-12 | End: 2021-08-19

## 2021-08-12 NOTE — TELEPHONE ENCOUNTER
Provider E-Visit time total (minutes): 11    I reviewed chart - last Urinary Tract Infection was susceptible to PCN and so sent augmentin.    Follow up if worsens or doesn't resolve.    Dr. Carolina Pulliam MD / [facility]

## 2021-08-12 NOTE — PATIENT INSTRUCTIONS
Dear Marylee Woods    After reviewing your responses, I've been able to diagnose you with a urinary tract infection, which is a common infection of the bladder with bacteria.  This is not a sexually transmitted infection, though urinating immediately after intercourse can help prevent infections.  Drinking lots of fluids is also helpful to clear your current infection and prevent the next one.      I have sent a prescription for antibiotics to your pharmacy to treat this infection.    It is important that you take all of your prescribed medication even if your symptoms are improving after a few doses.  Taking all of your medicine helps prevent the symptoms from returning.     If your symptoms worsen, you develop pain in your back or stomach, develop fevers, or are not improving in 5 days, please contact your primary care provider for an appointment or visit any of our convenient Walk-in or Urgent Care Centers to be seen, which can be found on our website here.    Thanks again for choosing us as your health care partner,    Carolina Pulliam MD

## 2021-08-13 LAB
ALP BONE SERPL-MCNC: 8.4 UG/L
DEPRECATED CALCIDIOL+CALCIFEROL SERPL-MC: <67 UG/L (ref 20–75)
VITAMIN D2 SERPL-MCNC: <5 UG/L
VITAMIN D3 SERPL-MCNC: 62 UG/L

## 2021-08-13 NOTE — PROGRESS NOTES
Marylee Woods  is being evaluated via a billable video visit.      How would you like to obtain your AVS? MyChart     Will anyone else be joining your video visit? Yes,  will join.

## 2021-08-14 LAB — BACTERIA UR CULT: ABNORMAL

## 2021-08-16 ENCOUNTER — VIRTUAL VISIT (OUTPATIENT)
Dept: ENDOCRINOLOGY | Facility: CLINIC | Age: 66
End: 2021-08-16
Payer: MEDICARE

## 2021-08-16 DIAGNOSIS — E89.0 POSTABLATIVE HYPOTHYROIDISM: Primary | ICD-10-CM

## 2021-08-16 DIAGNOSIS — E04.2 MULTIPLE THYROID NODULES: ICD-10-CM

## 2021-08-16 DIAGNOSIS — M81.0 OSTEOPOROSIS OF MULTIPLE SITES: ICD-10-CM

## 2021-08-16 PROCEDURE — 99215 OFFICE O/P EST HI 40 MIN: CPT | Mod: 95 | Performed by: INTERNAL MEDICINE

## 2021-08-16 RX ORDER — LEVOTHYROXINE SODIUM 75 UG/1
75 TABLET ORAL DAILY
Qty: 90 TABLET | Refills: 1 | Status: SHIPPED | OUTPATIENT
Start: 2021-08-16 | End: 2022-02-15

## 2021-08-16 RX ORDER — DEXAMETHASONE 1 MG
TABLET ORAL
Qty: 1 TABLET | Refills: 0 | Status: CANCELLED | OUTPATIENT
Start: 2021-08-16

## 2021-08-16 NOTE — RESULT ENCOUNTER NOTE
Hi Marylee:  The antibiotic I sent should have taken care of your Urinary Tract Infection.  Let me know if you have any questions.  Best,  Dr. Carolina Pulliam MD/[facility]
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
## 2021-08-18 DIAGNOSIS — F33.1 MODERATE EPISODE OF RECURRENT MAJOR DEPRESSIVE DISORDER (H): ICD-10-CM

## 2021-08-23 NOTE — TELEPHONE ENCOUNTER
Routing refill request to provider for review/approval because:  --Last PHQ-9 >4.            --Last visit:  7/9/2021     PHQ 11/27/2018 4/27/2019 5/20/2020   PHQ-9 Total Score 5 10 8   Q9: Thoughts of better off dead/self-harm past 2 weeks Not at all Several days Not at all   F/U: Thoughts of suicide or self-harm - No -   F/U: Safety concerns - No -

## 2021-08-24 RX ORDER — DULOXETIN HYDROCHLORIDE 60 MG/1
CAPSULE, DELAYED RELEASE ORAL
Qty: 90 CAPSULE | Refills: 0 | Status: SHIPPED | OUTPATIENT
Start: 2021-08-24 | End: 2021-11-17

## 2021-09-05 DIAGNOSIS — K21.9 GASTROESOPHAGEAL REFLUX DISEASE WITHOUT ESOPHAGITIS: ICD-10-CM

## 2021-09-07 RX ORDER — FAMOTIDINE 40 MG/1
TABLET, FILM COATED ORAL
Qty: 180 TABLET | Refills: 0 | Status: SHIPPED | OUTPATIENT
Start: 2021-09-07 | End: 2021-12-02

## 2021-09-07 NOTE — TELEPHONE ENCOUNTER
H2 Blockers Protocol Passed09/05/2021 07:27 AM   Patient is age 12 or older Protocol Details    Recent (12 mo) or future (30 days) visit within the authorizing provider's specialty     Medication is active on med list

## 2021-09-08 ENCOUNTER — E-VISIT (OUTPATIENT)
Dept: FAMILY MEDICINE | Facility: CLINIC | Age: 66
End: 2021-09-08
Payer: MEDICARE

## 2021-09-08 DIAGNOSIS — N39.0 ACUTE UTI (URINARY TRACT INFECTION): Primary | ICD-10-CM

## 2021-09-08 DIAGNOSIS — N18.31 STAGE 3A CHRONIC KIDNEY DISEASE (H): ICD-10-CM

## 2021-09-08 DIAGNOSIS — G35 MS (MULTIPLE SCLEROSIS) (H): ICD-10-CM

## 2021-09-08 PROCEDURE — 99421 OL DIG E/M SVC 5-10 MIN: CPT | Performed by: FAMILY MEDICINE

## 2021-09-08 NOTE — PATIENT INSTRUCTIONS
Dear Marylee Woods,     After reviewing your responses, I would like you to come in for a urine test to make sure we treat you correctly. This urine test is to evaluate you for a possible urinary tract infection, and should be scheduled for today or tomorrow. Schedule a Lab Only appointment here.     Lab appointments are not available at most locations on the weekends. If no Lab Only appointment is available, you should be seen in any of our convenient Walk-in or Urgent Care Centers, which can be found on our website here.     You will receive instructions with your results in [employer] once they are available.     If your symptoms worsen, you develop pain in your back or stomach, develop fevers, or are not improving in 5 days, please contact your primary care provider for an appointment or visit a Walk-in or Urgent Care Center to be seen.     Thanks again for choosing us as your health care partner,     Carolina Pulliam MD

## 2021-09-09 ENCOUNTER — LAB (OUTPATIENT)
Dept: LAB | Facility: CLINIC | Age: 66
End: 2021-09-09
Payer: MEDICARE

## 2021-09-09 DIAGNOSIS — N39.0 ACUTE UTI (URINARY TRACT INFECTION): ICD-10-CM

## 2021-09-09 LAB
ALBUMIN UR-MCNC: NEGATIVE MG/DL
APPEARANCE UR: CLEAR
BACTERIA #/AREA URNS HPF: ABNORMAL /HPF
BILIRUB UR QL STRIP: NEGATIVE
COLOR UR AUTO: YELLOW
GLUCOSE UR STRIP-MCNC: NEGATIVE MG/DL
HGB UR QL STRIP: ABNORMAL
KETONES UR STRIP-MCNC: NEGATIVE MG/DL
LEUKOCYTE ESTERASE UR QL STRIP: ABNORMAL
NITRATE UR QL: NEGATIVE
PH UR STRIP: 6.5 [PH] (ref 5–7)
RBC #/AREA URNS AUTO: ABNORMAL /HPF
SP GR UR STRIP: 1.01 (ref 1–1.03)
UROBILINOGEN UR STRIP-ACNC: 0.2 E.U./DL
WBC #/AREA URNS AUTO: >100 /HPF

## 2021-09-09 PROCEDURE — 81001 URINALYSIS AUTO W/SCOPE: CPT

## 2021-09-09 PROCEDURE — 87086 URINE CULTURE/COLONY COUNT: CPT

## 2021-09-09 PROCEDURE — 87186 SC STD MICRODIL/AGAR DIL: CPT

## 2021-09-10 LAB — BACTERIA UR CULT: ABNORMAL

## 2021-09-14 DIAGNOSIS — N39.0 ACUTE UTI (URINARY TRACT INFECTION): Primary | ICD-10-CM

## 2021-09-14 RX ORDER — SULFAMETHOXAZOLE/TRIMETHOPRIM 800-160 MG
1 TABLET ORAL 2 TIMES DAILY
Qty: 14 TABLET | Refills: 0 | Status: SHIPPED | OUTPATIENT
Start: 2021-09-14 | End: 2022-01-24

## 2021-10-23 ENCOUNTER — PATIENT OUTREACH (OUTPATIENT)
Dept: ENDOCRINOLOGY | Facility: CLINIC | Age: 66
End: 2021-10-23

## 2021-10-23 ENCOUNTER — HEALTH MAINTENANCE LETTER (OUTPATIENT)
Age: 66
End: 2021-10-23

## 2021-10-23 NOTE — PROGRESS NOTES
Attempted to reach patient to schedule follow up in the Endocrinology Clinic.  Renzo and Kathi message sent.     Schedule with Dr. Neli Bean, lab visit, and DEXA.

## 2021-10-25 ENCOUNTER — MYC MEDICAL ADVICE (OUTPATIENT)
Dept: INTERNAL MEDICINE | Facility: CLINIC | Age: 66
End: 2021-10-25

## 2021-10-25 ENCOUNTER — TELEPHONE (OUTPATIENT)
Dept: ENDOCRINOLOGY | Facility: CLINIC | Age: 66
End: 2021-10-25

## 2021-10-25 NOTE — TELEPHONE ENCOUNTER
Attempted to reach patient to schedule follow up in the Endocrinology Clinic.  Renzo and Kathi message sent.     Schedule lab and dexa scan as well as Dr Bean.

## 2021-11-05 ENCOUNTER — ANCILLARY PROCEDURE (OUTPATIENT)
Dept: BONE DENSITY | Facility: CLINIC | Age: 66
End: 2021-11-05
Attending: INTERNAL MEDICINE
Payer: MEDICARE

## 2021-11-05 ENCOUNTER — LAB (OUTPATIENT)
Dept: LAB | Facility: CLINIC | Age: 66
End: 2021-11-05
Payer: MEDICARE

## 2021-11-05 DIAGNOSIS — M81.0 OSTEOPOROSIS OF MULTIPLE SITES: ICD-10-CM

## 2021-11-05 DIAGNOSIS — E89.0 POSTABLATIVE HYPOTHYROIDISM: ICD-10-CM

## 2021-11-05 LAB
CORTIS SERPL-MCNC: 7.9 UG/DL (ref 4–22)
T4 FREE SERPL-MCNC: 1.15 NG/DL (ref 0.76–1.46)
TSH SERPL DL<=0.005 MIU/L-ACNC: 1.33 MU/L (ref 0.4–4)

## 2021-11-05 PROCEDURE — 82533 TOTAL CORTISOL: CPT | Performed by: INTERNAL MEDICINE

## 2021-11-05 PROCEDURE — 82024 ASSAY OF ACTH: CPT | Performed by: INTERNAL MEDICINE

## 2021-11-05 PROCEDURE — 77080 DXA BONE DENSITY AXIAL: CPT

## 2021-11-05 PROCEDURE — 77081 DXA BONE DENSITY APPENDICULR: CPT | Mod: XU

## 2021-11-05 PROCEDURE — 82306 VITAMIN D 25 HYDROXY: CPT | Performed by: INTERNAL MEDICINE

## 2021-11-05 PROCEDURE — 84439 ASSAY OF FREE THYROXINE: CPT | Performed by: PATHOLOGY

## 2021-11-05 PROCEDURE — 84443 ASSAY THYROID STIM HORMONE: CPT | Performed by: PATHOLOGY

## 2021-11-05 PROCEDURE — 36415 COLL VENOUS BLD VENIPUNCTURE: CPT | Performed by: PATHOLOGY

## 2021-11-08 LAB — ACTH PLAS-MCNC: <10 PG/ML

## 2021-11-09 ENCOUNTER — TELEPHONE (OUTPATIENT)
Dept: ENDOCRINOLOGY | Facility: CLINIC | Age: 66
End: 2021-11-09
Payer: MEDICARE

## 2021-11-09 LAB
DEPRECATED CALCIDIOL+CALCIFEROL SERPL-MC: <77 UG/L (ref 20–75)
VITAMIN D2 SERPL-MCNC: <5 UG/L
VITAMIN D3 SERPL-MCNC: 72 UG/L

## 2021-11-09 NOTE — TELEPHONE ENCOUNTER
LVM that [facility]Cs will be reaching out to help schedule follow up related to DEXA result.     Clinic coordinators notified to please help schedule follow up with Dr Bean per notes  Lisa Huber RN on 11/9/2021 at 9:03 AM     Neli Bean MD   11/8/2021 10:04 PM CST Back to Top        Please let the patient know the DEXA scan revealed severe osteoporosis.  I do need to talk with her regarding treatment.  She should schedule a video appointment.  She can be overbooked.

## 2021-11-09 NOTE — RESULT ENCOUNTER NOTE
Please let the patient know the DEXA scan revealed severe osteoporosis.  I do need to talk with her regarding treatment.  She should schedule a video appointment.  She can be overbooked.

## 2021-11-10 NOTE — TELEPHONE ENCOUNTER
LVM for patient to call and schedule appointment with Dr Bean. She should be seen next Monday 11/15 11:30 am for a FTF visit or at the end of the day on Monday 11/22 for a video visit. Sent [employer] message as well.

## 2021-11-12 NOTE — RESULT ENCOUNTER NOTE
The vitamin D level is slightly higher. The other labs are normal. What's the total daily dose of vitamin D you have been taking from supplements?

## 2021-11-15 DIAGNOSIS — F33.1 MODERATE EPISODE OF RECURRENT MAJOR DEPRESSIVE DISORDER (H): ICD-10-CM

## 2021-11-17 RX ORDER — DULOXETIN HYDROCHLORIDE 60 MG/1
CAPSULE, DELAYED RELEASE ORAL
Qty: 90 CAPSULE | Refills: 0 | Status: SHIPPED | OUTPATIENT
Start: 2021-11-17 | End: 2022-01-24

## 2021-11-17 NOTE — TELEPHONE ENCOUNTER
Serotonin-Norepinephrine Reuptake Inhibitors  Failed 11/15/2021 12:21 AM   Protocol Details  PHQ-9 score of less than 5 in past 6 months    Blood pressure under 140/90 in past 12 months    Medication is active on med list    Patient is age 18 or older    No active pregnancy on record    No positive pregnancy test in past 12 months    Recent (6 mo) or future (30 days) visit within the authorizing provider's specialty

## 2021-11-29 ENCOUNTER — TELEPHONE (OUTPATIENT)
Dept: ENDOCRINOLOGY | Facility: CLINIC | Age: 66
End: 2021-11-29
Payer: MEDICARE

## 2021-11-29 NOTE — TELEPHONE ENCOUNTER
LVM for Pt to please check [facility] for for messages from Dr Bean.   Lisa Huber, RN on 11/29/2021 at 9:18 AM         CCs: Please schedule sooner Per Dr Bean:     Result Care Coordination        Result Notes     Neli Bean MD   11/8/2021 10:04 PM CST Back to Top        Please let the patient know the DEXA scan revealed severe osteoporosis.  I do need to talk with her regarding treatment.  She should schedule a video appointment.  She can be overbooked.     And     Communication     Edit Comments   Add Notifications  Back to Top      The vitamin D level is slightly higher. The other labs are normal. What's the total daily dose of vitamin D you have been taking from supplements?   Written by Neli Bean MD on 11/12/2021  1:40 PM CST

## 2021-12-02 ENCOUNTER — VIRTUAL VISIT (OUTPATIENT)
Dept: ENDOCRINOLOGY | Facility: CLINIC | Age: 66
End: 2021-12-02
Payer: MEDICARE

## 2021-12-02 DIAGNOSIS — E89.0 POSTABLATIVE HYPOTHYROIDISM: ICD-10-CM

## 2021-12-02 DIAGNOSIS — E04.2 MULTIPLE THYROID NODULES: ICD-10-CM

## 2021-12-02 DIAGNOSIS — M81.0 OSTEOPOROSIS OF MULTIPLE SITES: Primary | ICD-10-CM

## 2021-12-02 PROCEDURE — 99214 OFFICE O/P EST MOD 30 MIN: CPT | Mod: 95 | Performed by: INTERNAL MEDICINE

## 2021-12-02 RX ORDER — DEXAMETHASONE 1 MG
TABLET ORAL
Qty: 1 TABLET | Refills: 0 | Status: SHIPPED | OUTPATIENT
Start: 2021-12-02 | End: 2022-01-24

## 2021-12-02 NOTE — PROGRESS NOTES
Marylee Woods  is being evaluated via a billable video visit.      How would you like to obtain your AVS? Opax  For the video visit, send the invitation by: Text to cell phone: [phone]  Will anyone else be joining your video visit? No

## 2021-12-02 NOTE — NURSING NOTE
Pt states she is not currently taking fish oil omega 3, nicotrol, or senokot.  Maranda Barillas on 12/2/2021 at 11:48 AM

## 2021-12-02 NOTE — PROGRESS NOTES
Video-Visit Details    Type of service:  Video Visit    Video call duration:   Started 12:19 pm   Ended: 12:41 pm     Originating Location (pt. Location): Home  [place]  [facility] [place] used for Video Visit: [employer]    Due to the COVID 19 pandemic this visit was converted to a video visit in order to help prevent spread of infection in this patient and the general population.     1. Graves disease   Marylee follows up with Dr. Redman for MS, at [facility].  The patient has had the thyroid hormone levels checked quite frequently, through her neurology clinic, given prior history of treatment with Lemtrada, in [id number]. The TSH was normal in 2018, 2019 and June 2020.  On 6/26/20, TSH level was 2.  The lab work from December 2020 revealed an undetectable TSH and an elevated free T4, of 3.3.    In 2017, she was incidentally diagnosed with thyroid nodules during a CAT scan.  The thyroid ultrasound from 9/7/2017 revealed multiple thyroid nodules.  The left inferior #4 thyroid nodule, measuring 2.2 cm, was benign on biopsy. Overall, the nodules had a benign ultrasound appearance, mostly spongiform.  The biopsied nodule had an internal macrocalcification, minimal internal vascularity, but no definite features suggestive of malignancy.  The patient has no prior history of radiation exposure or a family history of thyroid disease.  On the follow-up thyroid ultrasound from 1/14/2021, there were no significant changes of the thyroid nodules and the thyroid gland was very vascular.  TSI was 6.4, on 1/11/21.     Subsequently, the patient underwent treatment with 9.3 mCi I-131, on 2/9/2021.  Prior to treatment, the scan revealed a 24-hour uptake of 87.5%, with a computer estimated weight of 42.2 g.  The radiotracer uptake was uniform, with the exception of the caudal region in the inferior left thyroid lobe, which corresponded to the nodule which was biopsied in 2017.     She was  started on treatment with levothyroxine on 6/10/2021, at 25 mg daily for 1 week, then 2 tablets daily for 1 week, followed by a total daily dose of 75 mcg daily (since 6/28/21), which she continues to take. The TSH has decreased from 264 on 6/10/21 to 13.67 on 8/12/21, when free T4 was normal at 1.18.  Most recent TSH was 1.33, on 11/5/21.     2. Osteoporosis     Prior fractures:  2009 Right nondisplaced intertrochanteric fracture.  2013 Left tibial and fibular fracture   10/2018 Transverse fracture of the distal left femoral diaphysis, after falling from standing height.  6/2020 Left tibial plateau fracture     Treatment with Fosamax was recommended by her orthopedic surgeon, in 2018. As per patient, she started Fosamax in 2020 she took approximately 1 year.  For the last 5-6 years, she has been using a wheelchair.  She is not aware of decreased height over the years.  She does not drink milk and she only has cheese, intermittently.  Treatment with omeprazole was discontinued and it was replaced by Pepcid.  She does have heartburns, intermittently.  Marylee smoked for approximately 13 years.  She quit smoking in 1985, for 10 years, and then she resumed.  Currently, she smokes 4 cigarettes a day.    She has both upper and lower dentures. I reviewed the adrenal glands on the abdominal CT images from 6/30/21.     She confirms she has been taking the following calcium and vitamin D supplements:  600 mg calcium +800 units vitamin D BID  50 mcg vitamin D daily  A multivitamin which contains 700 units vitamin D daily (not taking it recently)    Pertinent labs reviewed:    11/5/21   Vitamin D 77     8/12/21 calcium 9.5, GFR 54, vitamin D 67, phos 3.4, albumin 3.6, PTH 96 (18-80).  The vitamin D supplement was discontinued a couple of months prior to this lab work.    7/3/2020 calcium 10  2011 cortisol 23.2  GFR in the 40s    9/11/2018  Vitamin D 83.4  Calcium 9.2    REVIEW OF SYSTEMS  Generalized muscle weakness  Dry eyes    Insomnia     Past Medical History  Past Medical History:   Diagnosis Date     Gastro-oesophageal reflux disease      Multiple sclerosis (H) 1990s   May 2020, diagnosed with Covid  Depression  Pancytopenia dating back to at least 2014  Hyperlipidemia  Lung nodules  Candidate esophagitis  UTI  R intertrochanteric femoral fracture 2009   Left tibial and fibular fracture in 2013  Left tibial plateau fracture June 2020   Left femoral fracture in 10/2018, after falling from standing height (above the knee)    Medications    Current Outpatient Medications:      acetaminophen (TYLENOL) 500 MG tablet, Take 1-2 tablets (500-1,000 mg) by mouth every 8 hours as needed for mild pain or fever (greater than 101 degrees), Disp: , Rfl:      baclofen (LIORESAL) 10 MG tablet, Take 2 tablets (20 MG) by mouth every morning, 1 tablet (10 MG) by mouth daily in the afternoon as needed, and 3 tablets (30 MG) by mouth every evening before bedtime., Disp: , Rfl:      DULoxetine (CYMBALTA) 60 MG capsule, TAKE 1 CAPSULE BY MOUTH EVERY DAY, Disp: 90 capsule, Rfl: 0     famotidine (PEPCID) 40 MG tablet, TAKE 1 TABLET BY MOUTH TWICE A DAY, Disp: 180 tablet, Rfl: 0     fish oil-omega-3 fatty acids 1000 MG capsule, Take 1 g by mouth daily, Disp: , Rfl:      gabapentin (NEURONTIN) 300 MG capsule, Take 2 tablets (600 MG) by mouth 3-4 times daily., Disp: , Rfl:      hydrochlorothiazide (HYDRODIURIL) 12.5 MG tablet, TAKE 1 TABLET BY MOUTH EVERY DAY, Disp: 90 tablet, Rfl: 0     latanoprost (XALATAN) 0.005 % ophthalmic solution, INSTILL 1 DROP INTO BOTH EYES EVERY DAY AS DIRECTED PT WILL NEED TO BE SEEN FOR FUTURE REFILLS, Disp: , Rfl:      levothyroxine (SYNTHROID/LEVOTHROID) 75 MCG tablet, Take 1 tablet (75 mcg) by mouth daily, Disp: 90 tablet, Rfl: 1     NICOTROL 10 MG inhaler, Inhale 1 Cartridge into the lungs daily as needed for smoking cessation , Disp: , Rfl: 1     oxybutynin (DITROPAN-XL) 10 MG 24 hr tablet, Take 1 tablet (10 mg) by mouth 2  times daily, Disp: 30 tablet, Rfl:      polyethylene glycol (MIRALAX) 17 GM/Dose powder, Take 17 g by mouth daily, Disp: 510 g, Rfl: 0     sennosides (SENOKOT) 8.6 MG tablet, Take 1 tablet by mouth daily, Disp: 30 tablet, Rfl: 0     valACYclovir (VALTREX) 500 MG tablet, Take 1 tablet (500 mg) by mouth 2 times daily, Disp: 12 tablet, Rfl: 5    Allergies  Allergies   Allergen Reactions     Amantadine      hallucinations     Budesonide      Symbicort Rash     Family History  family history includes Cancer in her maternal grandfather; Heart Disease in her father, maternal grandmother, mother, and paternal grandfather.  She has positive family of autoimmune disease. Her mom has lupus and hypopituitarism. No family history of thyroid disease.  Maternal grandfather had throat cancer. Type 1 diabetes - maternal grandmother. Diabetes - maternal aunt.     Social History  , 2 children.   Social History     Tobacco Use     Smoking status: Current Some Day Smoker     Packs/day: 0.25     Types: Cigarettes     Smokeless tobacco: Former User     Quit date: 10/31/2018   Substance Use Topics     Alcohol use: Yes     Alcohol/week: 0.0 standard drinks     Comment: 1/wk     Drug use: No   She smokes 4 ciggs a day. She has been smoking since age 17, with intermittent breaks. She rarely drinks alcohol.     Physical Exam   BP Readings from Last 6 Encounters:   07/09/21 126/70   07/02/21 115/57   06/10/21 110/65   04/14/21 115/65   04/10/21 119/74   07/03/20 116/66     LMP  (LMP Unknown)   There is no height or weight on file to calculate BMI.  Physical Exam  General Appearance: alert, no distress noted   Eyes: grossly normal to inspection, conjunctivae and sclerae normal, no lid lag or stare   Respiratory: no audible wheeze, cough, or visible cyanosis.  No visible retractions or increased work of breathing.  Able to speak fully in complete sentences.  Neurological: Cranial nerves grossly intact, mentation intact and speech normal;  no tremor of the hands   Skin: no lesions on exposed skin   Psychological: mentation appears normal, affect normal, judgement and insight intact, normal speech and appearance well-groomed    DATA REVIEW    TSH   Date Value Ref Range Status   11/05/2021 1.33 0.40 - 4.00 mU/L Final   06/30/2021 191.68 (H) 0.40 - 4.00 mU/L Final     T4 Total   Date Value Ref Range Status   02/28/2011 7.6 4.7 - 13.3 ug/dl Final     T4 Free   Date Value Ref Range Status   06/30/2021 0.67 (L) 0.76 - 1.46 ng/dL Final     Free T4   Date Value Ref Range Status   11/05/2021 1.15 0.76 - 1.46 ng/dL Final       ASSESSMENT/PLAN:     1.  Osteoporosis  The diagnosis is based on the prior history of femoral fractures after falling from a standing height, in 2009 and 2018.  Risk factors for osteoporosis identified: Postmenopausal status, sedentary lifestyle due to MS, prior treatment with PPI, smoking.    From 6/2020 until 2021, the patient was medicated with Fosamax (for ~1 year).   Recommendations:  Screen for subclinical Cushing by performing a dexamethasone suppression test   Screen for celiac disease  Discontinue the vitamin D supplements   Continue to take the comined calcium and vitamin D supplements and the MVI   Pending the above labwork, the plan is to start treatment with zoledronic acid (Reclast).  The patient was counseled on the administration of this medication, frequent side effects (flulike emergency), the rare risk of osteonecrosis of the jaw.    2.  Post ablative hypothyroidism  Clinically and biochemically, she is euthyroid. She should continue to take the same dose of levothyroxine.     3.  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  Recommendations:  Follow-up with ophthalmology    4. Thyroid nodules  Consider a f/up thyroid US at her next apt, in 6 months.     Orders Placed This Encounter   Procedures     [facility]; [id number] (Laboratory Miscellaneous Order)     Cortisol     Tissue transglutaminase antibody IgA     38 minutes spent on the date of the encounter doing chart review, history and exam, documentation and further activities as noted above.

## 2021-12-02 NOTE — LETTER
12/2/2021       RE: Marylee Woods  [address]  [facility] [address]     Dear Colleague,    Thank you for referring your patient, Marylee Woods, to the [facility] [place] [facility]. Please see a copy of my visit note below.    Video-Visit Details    Type of service:  Video Visit    Video call duration:   Started 12:19 pm   Ended: 12:41 pm     Originating Location (pt. Location): Home  Distant Location (provider location):  [facility] [place]  Platform used for Video Visit: [employer]Well    Due to the COVID 19 pandemic this visit was converted to a video visit in order to help prevent spread of infection in this patient and the general population.     1. Graves disease   Marylee follows up with Dr. Redman for MS, at [facility].  The patient has had the thyroid hormone levels checked quite frequently, through her neurology clinic, given prior history of treatment with Lemtrada, in [id number]. The TSH was normal in 2018, 2019 and June 2020.  On 6/26/20, TSH level was 2.  The lab work from December 2020 revealed an undetectable TSH and an elevated free T4, of 3.3.    In 2017, she was incidentally diagnosed with thyroid nodules during a CAT scan.  The thyroid ultrasound from 9/7/2017 revealed multiple thyroid nodules.  The left inferior #4 thyroid nodule, measuring 2.2 cm, was benign on biopsy. Overall, the nodules had a benign ultrasound appearance, mostly spongiform.  The biopsied nodule had an internal macrocalcification, minimal internal vascularity, but no definite features suggestive of malignancy.  The patient has no prior history of radiation exposure or a family history of thyroid disease.  On the follow-up thyroid ultrasound from 1/14/2021, there were no significant changes of the thyroid nodules and the thyroid gland was very vascular.  TSI was 6.4, on 1/11/21.     Subsequently, the patient underwent treatment with 9.3  mCi I-131, on 2/9/2021.  Prior to treatment, the scan revealed a 24-hour uptake of 87.5%, with a computer estimated weight of 42.2 g.  The radiotracer uptake was uniform, with the exception of the caudal region in the inferior left thyroid lobe, which corresponded to the nodule which was biopsied in 2017.     She was started on treatment with levothyroxine on 6/10/2021, at 25 mg daily for 1 week, then 2 tablets daily for 1 week, followed by a total daily dose of 75 mcg daily (since 6/28/21), which she continues to take. The TSH has decreased from 264 on 6/10/21 to 13.67 on 8/12/21, when free T4 was normal at 1.18.  Most recent TSH was 1.33, on 11/5/21.     2. Osteoporosis     Prior fractures:  2009 Right nondisplaced intertrochanteric fracture.  2013 Left tibial and fibular fracture   10/2018 Transverse fracture of the distal left femoral diaphysis, after falling from standing height.  6/2020 Left tibial plateau fracture     Treatment with Fosamax was recommended by her orthopedic surgeon, in 2018. As per patient, she started Fosamax in 2020 she took approximately 1 year.  For the last 5-6 years, she has been using a wheelchair.  She is not aware of decreased height over the years.  She does not drink milk and she only has cheese, intermittently.  Treatment with omeprazole was discontinued and it was replaced by Pepcid.  She does have heartburns, intermittently.  Marylee smoked for approximately 13 years.  She quit smoking in 1985, for 10 years, and then she resumed.  Currently, she smokes 4 cigarettes a day.    She has both upper and lower dentures. I reviewed the adrenal glands on the abdominal CT images from 6/30/21.     She confirms she has been taking the following calcium and vitamin D supplements:  600 mg calcium +800 units vitamin D BID  50 mcg vitamin D daily  A multivitamin which contains 700 units vitamin D daily (not taking it recently)    Pertinent labs reviewed:    11/5/21   Vitamin D 77     8/12/21  calcium 9.5, GFR 54, vitamin D 67, phos 3.4, albumin 3.6, PTH 96 (18-80).  The vitamin D supplement was discontinued a couple of months prior to this lab work.    7/3/2020 calcium 10  2011 cortisol 23.2  GFR in the 40s    9/11/2018  Vitamin D 83.4  Calcium 9.2    REVIEW OF SYSTEMS  Generalized muscle weakness  Dry eyes   Insomnia     Past Medical History  Past Medical History:   Diagnosis Date     Gastro-oesophageal reflux disease      Multiple sclerosis (H) 1990s   May 2020, diagnosed with Covid  Depression  Pancytopenia dating back to at least 2014  Hyperlipidemia  Lung nodules  Candidate esophagitis  UTI  R intertrochanteric femoral fracture 2009   Left tibial and fibular fracture in 2013  Left tibial plateau fracture June 2020   Left femoral fracture in 10/2018, after falling from standing height (above the knee)    Medications    Current Outpatient Medications:      acetaminophen (TYLENOL) 500 MG tablet, Take 1-2 tablets (500-1,000 mg) by mouth every 8 hours as needed for mild pain or fever (greater than 101 degrees), Disp: , Rfl:      baclofen (LIORESAL) 10 MG tablet, Take 2 tablets (20 MG) by mouth every morning, 1 tablet (10 MG) by mouth daily in the afternoon as needed, and 3 tablets (30 MG) by mouth every evening before bedtime., Disp: , Rfl:      DULoxetine (CYMBALTA) 60 MG capsule, TAKE 1 CAPSULE BY MOUTH EVERY DAY, Disp: 90 capsule, Rfl: 0     famotidine (PEPCID) 40 MG tablet, TAKE 1 TABLET BY MOUTH TWICE A DAY, Disp: 180 tablet, Rfl: 0     fish oil-omega-3 fatty acids 1000 MG capsule, Take 1 g by mouth daily, Disp: , Rfl:      gabapentin (NEURONTIN) 300 MG capsule, Take 2 tablets (600 MG) by mouth 3-4 times daily., Disp: , Rfl:      hydrochlorothiazide (HYDRODIURIL) 12.5 MG tablet, TAKE 1 TABLET BY MOUTH EVERY DAY, Disp: 90 tablet, Rfl: 0     latanoprost (XALATAN) 0.005 % ophthalmic solution, INSTILL 1 DROP INTO BOTH EYES EVERY DAY AS DIRECTED PT WILL NEED TO BE SEEN FOR FUTURE REFILLS, Disp: , Rfl:       levothyroxine (SYNTHROID/LEVOTHROID) 75 MCG tablet, Take 1 tablet (75 mcg) by mouth daily, Disp: 90 tablet, Rfl: 1     NICOTROL 10 MG inhaler, Inhale 1 Cartridge into the lungs daily as needed for smoking cessation , Disp: , Rfl: 1     oxybutynin (DITROPAN-XL) 10 MG 24 hr tablet, Take 1 tablet (10 mg) by mouth 2 times daily, Disp: 30 tablet, Rfl:      polyethylene glycol (MIRALAX) 17 GM/Dose powder, Take 17 g by mouth daily, Disp: 510 g, Rfl: 0     sennosides (SENOKOT) 8.6 MG tablet, Take 1 tablet by mouth daily, Disp: 30 tablet, Rfl: 0     valACYclovir (VALTREX) 500 MG tablet, Take 1 tablet (500 mg) by mouth 2 times daily, Disp: 12 tablet, Rfl: 5    Allergies  Allergies   Allergen Reactions     Amantadine      hallucinations     Budesonide      Symbicort Rash     Family History  family history includes Cancer in her maternal grandfather; Heart Disease in her father, maternal grandmother, mother, and paternal grandfather.  She has positive family of autoimmune disease. Her mom has lupus and hypopituitarism. No family history of thyroid disease.  Maternal grandfather had throat cancer. Type 1 diabetes - maternal grandmother. Diabetes - maternal aunt.     Social History  , 2 children.   Social History     Tobacco Use     Smoking status: Current Some Day Smoker     Packs/day: 0.25     Types: Cigarettes     Smokeless tobacco: Former User     Quit date: 10/31/2018   Substance Use Topics     Alcohol use: Yes     Alcohol/week: 0.0 standard drinks     Comment: 1/wk     Drug use: No   She smokes 4 ciggs a day. She has been smoking since age 17, with intermittent breaks. She rarely drinks alcohol.     Physical Exam   BP Readings from Last 6 Encounters:   07/09/21 126/70   07/02/21 115/57   06/10/21 110/65   04/14/21 115/65   04/10/21 119/74   07/03/20 116/66     LMP  (LMP Unknown)   There is no height or weight on file to calculate BMI.  Physical Exam  General Appearance: alert, no distress noted   Eyes: grossly  normal to inspection, conjunctivae and sclerae normal, no lid lag or stare   Respiratory: no audible wheeze, cough, or visible cyanosis.  No visible retractions or increased work of breathing.  Able to speak fully in complete sentences.  Neurological: Cranial nerves grossly intact, mentation intact and speech normal; no tremor of the hands   Skin: no lesions on exposed skin   Psychological: mentation appears normal, affect normal, judgement and insight intact, normal speech and appearance well-groomed    DATA REVIEW    TSH   Date Value Ref Range Status   11/05/2021 1.33 0.40 - 4.00 mU/L Final   06/30/2021 191.68 (H) 0.40 - 4.00 mU/L Final     T4 Total   Date Value Ref Range Status   02/28/2011 7.6 4.7 - 13.3 ug/dl Final     T4 Free   Date Value Ref Range Status   06/30/2021 0.67 (L) 0.76 - 1.46 ng/dL Final     Free T4   Date Value Ref Range Status   11/05/2021 1.15 0.76 - 1.46 ng/dL Final       ASSESSMENT/PLAN:     1.  Osteoporosis  The diagnosis is based on the prior history of femoral fractures after falling from a standing height, in 2009 and 2018.  Risk factors for osteoporosis identified: Postmenopausal status, sedentary lifestyle due to MS, prior treatment with PPI, smoking.    From 6/2020 until 2021, the patient was medicated with Fosamax (for ~1 year).   Recommendations:  Screen for subclinical [place] by performing a dexamethasone suppression test   Screen for celiac disease  Discontinue the vitamin D supplements   Continue to take the comined calcium and vitamin D supplements and the MVI   Pending the above labwork, the plan is to start treatment with zoledronic acid (Reclast).  The patient was counseled on the administration of this medication, frequent side effects (flulike emergency), the rare risk of osteonecrosis of the jaw.    2.  Post ablative hypothyroidism  Clinically and biochemically, she is euthyroid. She should continue to take the same dose of levothyroxine.     3.  Graves' ophthalmopathy due  to seropositive Graves' disease, formally diagnosed in October 2020, status post radioiodine ablation with 9.3 mCi I-131, on 2/9/2021.  Recommendations:  Follow-up with ophthalmology    4. Thyroid nodules  Consider a f/up thyroid US at her next apt, in 6 months.     Orders Placed This Encounter   Procedures     [facility]; [id number] (Laboratory Miscellaneous Order)     Cortisol     Tissue transglutaminase antibody IgA     38 minutes spent on the date of the encounter doing chart review, history and exam, documentation and further activities as noted above.      Marylee Woods  is being evaluated via a billable video visit.      How would you like to obtain your AVS? MyChart  For the video visit, send the invitation by: Text to cell phone: [phone]  Will anyone else be joining your video visit? No    Again, thank you for allowing me to participate in the care of your patient.      Sincerely,    Neli Bean MD

## 2021-12-03 ASSESSMENT — PATIENT HEALTH QUESTIONNAIRE - PHQ9: SUM OF ALL RESPONSES TO PHQ QUESTIONS 1-9: 14

## 2021-12-07 DIAGNOSIS — I10 ESSENTIAL HYPERTENSION, BENIGN: ICD-10-CM

## 2021-12-09 RX ORDER — HYDROCHLOROTHIAZIDE 12.5 MG/1
TABLET ORAL
Qty: 90 TABLET | Refills: 0 | Status: SHIPPED | OUTPATIENT
Start: 2021-12-09 | End: 2022-01-24

## 2021-12-09 NOTE — TELEPHONE ENCOUNTER
Routing refill request to provider for review/approval because:   Normal serum creatinine on file in past 12 months  Creatinine   Date Value Ref Range Status   08/12/2021 1.08 (H) 0.52 - 1.04 mg/dL Final   07/09/2021 0.98 0.52 - 1.04 mg/dL Final     Last Written Prescription Date:  5/10/21  Last Fill Quantity: 90,  # refills: 0   Last office visit: 7/9/2021 with Dep[facility]  Future Office Visit:      Dasia Ferreira RN  [facility]

## 2021-12-09 NOTE — TELEPHONE ENCOUNTER
Patient due for appiontment.  I sent kyrie refill x 90 days.  Reception: Please call to schedule patient for appointment.  Thank you!  VALERIE

## 2021-12-17 ENCOUNTER — LAB (OUTPATIENT)
Dept: LAB | Facility: CLINIC | Age: 66
End: 2021-12-17
Payer: MEDICARE

## 2021-12-17 DIAGNOSIS — M81.0 OSTEOPOROSIS OF MULTIPLE SITES: ICD-10-CM

## 2021-12-17 LAB
CORTIS SERPL-MCNC: 2.2 UG/DL (ref 4–22)
Lab: NORMAL
PERFORMING LABORATORY: NORMAL
TEST NAME: NORMAL

## 2021-12-17 PROCEDURE — 36415 COLL VENOUS BLD VENIPUNCTURE: CPT | Performed by: PATHOLOGY

## 2021-12-17 PROCEDURE — 82533 TOTAL CORTISOL: CPT | Performed by: INTERNAL MEDICINE

## 2021-12-17 PROCEDURE — 83516 IMMUNOASSAY NONANTIBODY: CPT | Performed by: INTERNAL MEDICINE

## 2021-12-17 PROCEDURE — 80299 QUANTITATIVE ASSAY DRUG: CPT | Performed by: PATHOLOGY

## 2021-12-21 LAB — TTG IGA SER-ACNC: 0.7 U/ML

## 2021-12-23 RX ORDER — ZOLEDRONIC ACID 5 MG/100ML
5 INJECTION, SOLUTION INTRAVENOUS ONCE
Status: CANCELLED
Start: 2022-01-12 | End: 2022-01-12

## 2021-12-23 NOTE — RESULT ENCOUNTER NOTE
The screening test for celiac disease came back negative.  The cortisol level was appropriately low following the administration of dexamethasone.  I placed an order to have the Reclast infusion scheduled.  Please let me know if you have questions.

## 2021-12-25 LAB — MISCELLANEOUS TEST 1 (ARUP): NORMAL

## 2021-12-27 NOTE — RESULT ENCOUNTER NOTE
The screening test for celiac disease came back negative.  The cortisol level following the administration of dexamethasone came back borderline elevated.  I recommend to have this evaluated by checking the salivary cortisol level.  I pasted below the instructions on how to collect the saliva.  You can  a collection kit (salivette) from any [facility] facility which is closer to your house.    Instructions for Collecting Saliva Samples    Do not eat a major meal within 60 minutes prior to sample collection.  Avoid eating dairy products during the 30 minutes prior to sample collection. Avoid acidic and high sugar foods.  Avoid alcohol consumption 24 hours prior to collection. No caffeinated products for 2 hours before sampling.  Do not apply creams or lotions that contain steroids immediately prior to collection (this is to avoid contamination of the salivette).  Avoid activities that can cause the gums to bleed for 3 hours prior to collection. This would include brushing or flowing your teeth. Do not collect a sample if the gums or the inside of the mouth is bleeding. Blood in the saliva will invalidate the measurment of Saliva Cortisol.  Wash the mouth out with water 15 minutes before collecting the sample.    If there is bleeding in the mouth DO NOT complete the test.    Using a Salivette    1.  Remove cylindrical shaped swab from the insert and place in the mouth.  2.  The swab should be chewed for 30-45 seconds or until one can no longer prevent swallowing excess saliva (swab is saturated).  3.  If the swab cannot be chewed it can be placed under the tongue for 30-45 seconds or until well saturated.  4.  After the swab is saturated, it is returned to the Salivette and the stopper is replaced immediately.   5.  Record time of collection on the test request form and on Salivette transport container.  Store sample in the refrigerator.

## 2022-01-10 ENCOUNTER — TRANSFERRED RECORDS (OUTPATIENT)
Dept: HEALTH INFORMATION MANAGEMENT | Facility: CLINIC | Age: 67
End: 2022-01-10

## 2022-01-10 ENCOUNTER — MYC MEDICAL ADVICE (OUTPATIENT)
Dept: ENDOCRINOLOGY | Facility: CLINIC | Age: 67
End: 2022-01-10
Payer: MEDICARE

## 2022-01-21 ENCOUNTER — MYC MEDICAL ADVICE (OUTPATIENT)
Dept: FAMILY MEDICINE | Facility: CLINIC | Age: 67
End: 2022-01-21

## 2022-01-24 ENCOUNTER — OFFICE VISIT (OUTPATIENT)
Dept: FAMILY MEDICINE | Facility: CLINIC | Age: 67
End: 2022-01-24
Payer: MEDICARE

## 2022-01-24 VITALS
DIASTOLIC BLOOD PRESSURE: 63 MMHG | SYSTOLIC BLOOD PRESSURE: 123 MMHG | HEART RATE: 65 BPM | TEMPERATURE: 96.8 F | OXYGEN SATURATION: 99 % | RESPIRATION RATE: 15 BRPM

## 2022-01-24 DIAGNOSIS — R39.9 SYMPTOMS INVOLVING URINARY SYSTEM: Primary | ICD-10-CM

## 2022-01-24 DIAGNOSIS — Z72.0 TOBACCO ABUSE DISORDER: ICD-10-CM

## 2022-01-24 DIAGNOSIS — Z23 HIGH PRIORITY FOR 2019-NCOV VACCINE: ICD-10-CM

## 2022-01-24 DIAGNOSIS — N39.0 ACUTE UTI (URINARY TRACT INFECTION): ICD-10-CM

## 2022-01-24 DIAGNOSIS — N39.0 FREQUENT UTI: ICD-10-CM

## 2022-01-24 DIAGNOSIS — N18.30 STAGE 3 CHRONIC KIDNEY DISEASE, UNSPECIFIED WHETHER STAGE 3A OR 3B CKD (H): ICD-10-CM

## 2022-01-24 DIAGNOSIS — N64.4 MASTALGIA: ICD-10-CM

## 2022-01-24 DIAGNOSIS — I10 ESSENTIAL HYPERTENSION, BENIGN: ICD-10-CM

## 2022-01-24 DIAGNOSIS — G35 MS (MULTIPLE SCLEROSIS) (H): ICD-10-CM

## 2022-01-24 DIAGNOSIS — F33.1 MODERATE EPISODE OF RECURRENT MAJOR DEPRESSIVE DISORDER (H): ICD-10-CM

## 2022-01-24 PROBLEM — D61.818 PANCYTOPENIA (H): Status: RESOLVED | Noted: 2017-10-03 | Resolved: 2022-01-24

## 2022-01-24 LAB
ALBUMIN UR-MCNC: NEGATIVE MG/DL
APPEARANCE UR: CLEAR
BACTERIA #/AREA URNS HPF: ABNORMAL /HPF
BILIRUB UR QL STRIP: NEGATIVE
COLOR UR AUTO: YELLOW
GLUCOSE UR STRIP-MCNC: NEGATIVE MG/DL
HGB UR QL STRIP: NEGATIVE
KETONES UR STRIP-MCNC: NEGATIVE MG/DL
LEUKOCYTE ESTERASE UR QL STRIP: ABNORMAL
NITRATE UR QL: NEGATIVE
PH UR STRIP: 6 [PH] (ref 5–7)
RBC #/AREA URNS AUTO: ABNORMAL /HPF
SP GR UR STRIP: 1.01 (ref 1–1.03)
SQUAMOUS #/AREA URNS AUTO: ABNORMAL /LPF
UROBILINOGEN UR STRIP-ACNC: 0.2 E.U./DL
WBC #/AREA URNS AUTO: ABNORMAL /HPF

## 2022-01-24 PROCEDURE — 96127 BRIEF EMOTIONAL/BEHAV ASSMT: CPT | Performed by: NURSE PRACTITIONER

## 2022-01-24 PROCEDURE — 99215 OFFICE O/P EST HI 40 MIN: CPT | Performed by: NURSE PRACTITIONER

## 2022-01-24 PROCEDURE — 87086 URINE CULTURE/COLONY COUNT: CPT | Performed by: NURSE PRACTITIONER

## 2022-01-24 PROCEDURE — 91306 COVID-19,PF,MODERNA (18+ YRS BOOSTER .25ML): CPT | Performed by: NURSE PRACTITIONER

## 2022-01-24 PROCEDURE — 81001 URINALYSIS AUTO W/SCOPE: CPT | Performed by: NURSE PRACTITIONER

## 2022-01-24 PROCEDURE — 87186 SC STD MICRODIL/AGAR DIL: CPT | Performed by: NURSE PRACTITIONER

## 2022-01-24 PROCEDURE — 0064A COVID-19,PF,MODERNA (18+ YRS BOOSTER .25ML): CPT | Performed by: NURSE PRACTITIONER

## 2022-01-24 RX ORDER — DULOXETIN HYDROCHLORIDE 60 MG/1
CAPSULE, DELAYED RELEASE ORAL
Qty: 90 CAPSULE | Refills: 3 | Status: SHIPPED | OUTPATIENT
Start: 2022-01-24 | End: 2023-01-20

## 2022-01-24 RX ORDER — DULOXETIN HYDROCHLORIDE 20 MG/1
20 CAPSULE, DELAYED RELEASE ORAL DAILY
Qty: 90 CAPSULE | Refills: 3 | Status: SHIPPED | OUTPATIENT
Start: 2022-01-24 | End: 2023-01-20

## 2022-01-24 RX ORDER — SULFAMETHOXAZOLE/TRIMETHOPRIM 800-160 MG
1 TABLET ORAL 2 TIMES DAILY
Qty: 14 TABLET | Refills: 0 | Status: SHIPPED | OUTPATIENT
Start: 2022-01-24 | End: 2022-03-23

## 2022-01-24 RX ORDER — HYDROCHLOROTHIAZIDE 12.5 MG/1
12.5 TABLET ORAL DAILY
Qty: 90 TABLET | Refills: 3 | Status: SHIPPED | OUTPATIENT
Start: 2022-01-24 | End: 2023-03-17

## 2022-01-24 RX ORDER — NITROFURANTOIN 25; 75 MG/1; MG/1
100 CAPSULE ORAL DAILY
Qty: 90 CAPSULE | Refills: 1 | Status: SHIPPED | OUTPATIENT
Start: 2022-01-24 | End: 2022-06-29

## 2022-01-24 ASSESSMENT — PATIENT HEALTH QUESTIONNAIRE - PHQ9: SUM OF ALL RESPONSES TO PHQ QUESTIONS 1-9: 12

## 2022-01-24 NOTE — LETTER
January 24, 2022      Marylee Woods  [address] [facility] [address]        To Whom It May Concern,      Marylee will be using a manual wheelchair for at least the next year due to weakness due to multiple sclerosis.            Sincerely,        Maru Tipton, DNP, CNP

## 2022-01-24 NOTE — TELEPHONE ENCOUNTER
ROSALEE to Dr Pulliam.  I did respond to patient's other message that included her concerns.  It looks like Pau Tipton was able to address them at today's appointment.  Kiya Mccabe RN  [facility]

## 2022-01-24 NOTE — PROGRESS NOTES
Assessment & Plan     (R39.9) Symptoms involving urinary system  (primary encounter diagnosis)  Comment:   Plan: UA with Microscopic reflex to Culture - lab         collect, Urine Microscopic, Urine Culture        Appears to have a UTI.  Will treat with Bactrim and await culture.  After treatment, will restart daily Macrobid.  If she continues to have breakthrough UTIs, will have her see urology again.     (I10) Essential hypertension, benign  Comment: at goal  Plan: hydrochlorothiazide (HYDRODIURIL) 12.5 MG         tablet, Basic metabolic panel  (Ca, Cl, CO2,         Creat, Gluc, K, Na, BUN)        Discussed need to stay well-hydrated.     (F33.1) Moderate episode of recurrent major depressive disorder (H)  Comment: not controlled  Plan: DULoxetine (CYMBALTA) 60 MG capsule, DULoxetine        (CYMBALTA) 20 MG capsule        Will try increasing dose to 80 mg.  Follow up in one month.     (Z72.0) Tobacco abuse disorder  Comment:   Plan: nicotine (NICOTROL) 10 MG inhaler        She has quit smoking but has requested to be able to use this infrequently if needed.     (N64.4) Mastalgia  Comment:   Plan: MA Diagnostic Digital Bilateral        Will order a diagnostic mammogram.     (Z23) High priority for 2019-nCoV vaccine  Comment:   Plan: COVID-19,PF,MODERNA (18+ Yrs BOOSTER .25mL)            (N39.0) Frequent UTI  Comment:   Plan: nitroFURantoin macrocrystal-monohydrate         (MACROBID) 100 MG capsule        See above.     (G35) MS (multiple sclerosis) (H)  Comment:   Plan: Currently having more weakness and needing to use her wheelchair more often.  Letter given as requested by patient so she can get her wheelchair repaired.     (N18.30) Stage 3 chronic kidney disease, unspecified whether stage 3a or 3b CKD (H)  Comment: stable  Plan: Will recheck bmp.     (N39.0) Acute UTI (urinary tract infection)  Comment:   Plan: sulfamethoxazole-trimethoprim (BACTRIM DS)         800-160 MG tablet        See above.         71  minutes spent on the date of the encounter doing chart review, patient visit and documentation          No follow-ups on file.    Maru Tipton NP  [facility]    Subjective Marylee is a 66 year old who presents for the following health issues  accompanied by her spouse.    HPI            Refills needed NICOTROL 10 MG inhaler        Hypertension Follow-up       Do you check your blood pressure regularly outside of the clinic? No     Are you following a low salt diet? No    Are your blood pressures ever more than 140 on the top number (systolic) OR more   than 90 on the bottom number (diastolic), for example 140/90? No        How many servings of fruits and vegetables do you eat daily?      On average, how many sweetened beverages do you drink each day (Examples: soda, juice, sweet tea, etc.  Do NOT count diet or artificially sweetened beverages)?       How many days per week do you exercise enough to make your heart beat faster?     How many minutes a day do you exercise enough to make your heart beat faster?     How many days per week do you miss taking your medication?     Feels that she has more down days than up days.   She has been having more leg weakness with her MS, needing to use her wheelchair more, which causes more depression.  She has had more fatigue during the day.  She has been on Duloxetine for over a year.  She has been on other antidepressants in the past, did have apathy on higher doses.      She has been getting more frequent UTIs over the past 6 months.   Typically, they start with an odor. Not all of them progress to causing pain.  She has had documented UTIs in March, June, August, September.  She has seen urology many years ago.  She was on daily Macrobid for prevention in the past.  She is at increased risk for UTI secondary to MS.     She is doing well on hydrochlorothiazide, but often does not drink enough fluids.  She does have CKD, most recently  creatinine was slightly elevated as well.           Review of Systems         Objective    /63 (BP Location: Left arm, Patient Position: Chair, Cuff Size: Adult Regular)   Pulse 65   Temp 96.8  F (36  C) (Temporal)   Resp 15   LMP  (LMP Unknown)   SpO2 99%   There is no height or weight on file to calculate BMI.  Physical Exam   GENERAL: healthy, alert and no distress  BREAST: no palpable axillary masses or adenopathy and left breast with tenderness upon palpation upper quadrant  CV: regular rate and rhythm, normal S1 S2, no S3 or S4, no murmur, click or rub, no peripheral edema and peripheral pulses strong  MS: in wheelchair, unable to independently transfer  PSYCH: mentation appears normal, affect normal

## 2022-01-25 LAB — BACTERIA UR CULT: ABNORMAL

## 2022-01-26 NOTE — TELEPHONE ENCOUNTER
Writer responded via [employer].    SAEID LowN, RN  [facility]

## 2022-01-31 ENCOUNTER — OFFICE VISIT (OUTPATIENT)
Dept: ENDOCRINOLOGY | Facility: CLINIC | Age: 67
End: 2022-01-31
Payer: MEDICARE

## 2022-01-31 ENCOUNTER — LAB (OUTPATIENT)
Dept: LAB | Facility: CLINIC | Age: 67
End: 2022-01-31
Payer: MEDICARE

## 2022-01-31 VITALS
SYSTOLIC BLOOD PRESSURE: 115 MMHG | WEIGHT: 130 LBS | BODY MASS INDEX: 19.26 KG/M2 | DIASTOLIC BLOOD PRESSURE: 69 MMHG | HEIGHT: 69 IN | HEART RATE: 80 BPM

## 2022-01-31 DIAGNOSIS — E04.2 MULTIPLE THYROID NODULES: ICD-10-CM

## 2022-01-31 DIAGNOSIS — R79.89 ELEVATED CORTISOL LEVEL: ICD-10-CM

## 2022-01-31 DIAGNOSIS — M81.0 OSTEOPOROSIS OF MULTIPLE SITES: Primary | ICD-10-CM

## 2022-01-31 DIAGNOSIS — E89.0 POSTABLATIVE HYPOTHYROIDISM: ICD-10-CM

## 2022-01-31 PROCEDURE — 99214 OFFICE O/P EST MOD 30 MIN: CPT | Performed by: INTERNAL MEDICINE

## 2022-01-31 ASSESSMENT — MIFFLIN-ST. JEOR: SCORE: 1190.09

## 2022-01-31 ASSESSMENT — PAIN SCALES - GENERAL: PAINLEVEL: NO PAIN (0)

## 2022-01-31 NOTE — LETTER
1/31/2022       RE: Marylee Woods  [address]  [facility] [address]     Dear Colleague,    Thank you for referring your patient, Marylee Woods, to the [facility] [place] [facility]. Please see a copy of my visit note below.      1. Graves disease   Marylee follows up with Dr. Redman for MS, at [facility].  The patient has had the thyroid hormone levels checked quite frequently, through her neurology clinic, given prior history of treatment with [place], in [id number].  The patient was formally diagnosed with Graves' disease in October 2020.  Subsequently, she underwent treatment with 9.3 mCi I-131, on 2/9/2021.  Prior to treatment, the scan revealed a 24-hour uptake of 87.5%, with a computer estimated weight of 42.2 g.  The radiotracer uptake was uniform, with the exception of the caudal region in the inferior left thyroid lobe, which corresponded to the nodule which was biopsied in 2017.     In 2017, she was incidentally diagnosed with thyroid nodules during a CAT scan.  The thyroid ultrasound from 9/7/2017 revealed multiple thyroid nodules.  The left inferior #4 thyroid nodule, measuring 2.2 cm, was benign on biopsy. Overall, the nodules had a benign ultrasound appearance, mostly spongiform.  The biopsied nodule had an internal macrocalcification, minimal internal vascularity, but no definite features suggestive of malignancy.  The patient has no prior history of radiation exposure or a family history of thyroid disease.  On the follow-up thyroid ultrasound from 1/14/2021, there were no significant changes of the thyroid nodules and the thyroid gland was very vascular.    She was started on treatment with levothyroxine on 6/10/2021, at 25 mg daily for 1 week, then 2 tablets daily for 1 week, followed by a total daily dose of 75 mcg daily (since 6/28/21), which she continues to take.Most recent TSH was  1.33, on 11/5/21.  As per patient, she continues to have the thyroid hormone levels checked through her neurology clinic on a monthly basis.    On questioning, she denies palpitations, tremor, significant hair loss or dry skin.  Her weight has been stable.  She does endorse a longstanding history of fatigue, constipation cold intolerance.  The dryness of the eyes got worse following the cataract surgery from last June.  Intermittently, for the last year, certain food products like lasagna get stuck in the epigastric area.  She rarely experiences heartburns.  Denies dysphagia, voice hoarseness or cough.    2. Osteoporosis     Prior fractures:  2009 Right nondisplaced intertrochanteric fracture.  2013 Left tibial and fibular fracture   10/2018 Transverse fracture of the distal left femoral diaphysis, after falling from standing height.  6/2020 Left tibial plateau fracture     Treatment with Fosamax was recommended by her orthopedic surgeon, in 2018. As per patient, she started Fosamax in 2020 she took it for approximately 1 year.  For the last 5-6 years, she has been using a wheelchair.  She is not aware of decreased height over the years.  She does not drink milk and she only has cheese, intermittently.  Treatment with omeprazole was discontinued and it was replaced by Pepcid.  Marylee smokes since age 17, with intermittent breaks, generally less than half a pack a day.  She quit smoking this month.  She has both upper and lower dentures.    The following calcium and vitamin D supplements were recommended.:  600 mg calcium +800 units vitamin D BID  A multivitamin which contains 700 units vitamin D daily  The patient is not sure of the current calcium and vitamin D dosages but she is going to contact us back when she gets home, if the dosages are different.    On the DEXA scan images from 11/5/2021, L2-L4 T score was -5.3, left femoral neck T score was -5.1, total left femur T score -6, 33% distal radius T score  -3.5.  The abdominal CT images from June 2021 revealed questionable small bilateral adrenal nodularity.    Pertinent labs reviewed:  12/21 negative tissue transglutaminase antibodies, 1 mg dexamethasone suppression test revealed a cortisol level 2.2, with an appropriate dexamethasone level.    11/5/21   Vitamin D 77, ACTH<10, cortisol 7.9  8/12/21 calcium 9.5, GFR 54, vitamin D 67, phos 3.4, albumin 3.6, PTH 96 (18-80).  The vitamin D supplement was discontinued a couple of months prior to this lab work.  7/3/2020 calcium 10  2011 cortisol 23.2  GFR in the 40s  9/11/2018  Vitamin D 83.4  Calcium 9.2    Past Medical History  Past Medical History:   Diagnosis Date     Gastro-oesophageal reflux disease      Multiple sclerosis (H) 1990s   May 2020, diagnosed with Covid  Depression  Pancytopenia dating back to at least 2014  Hyperlipidemia  Lung nodules  Candida esophagitis  UTI  R intertrochanteric femoral fracture 2009   Left tibial and fibular fracture in 2013  Left tibial plateau fracture June 2020   Left femoral fracture in 10/2018, after falling from standing height (above the knee)    Medications    Current Outpatient Medications:      acetaminophen (TYLENOL) 500 MG tablet, Take 1-2 tablets (500-1,000 mg) by mouth every 8 hours as needed for mild pain or fever (greater than 101 degrees), Disp: , Rfl:      baclofen (LIORESAL) 10 MG tablet, Take 2 tablets (20 MG) by mouth every morning, 1 tablet (10 MG) by mouth daily in the afternoon as needed, and 3 tablets (30 MG) by mouth every evening before bedtime., Disp: , Rfl:      DULoxetine (CYMBALTA) 20 MG capsule, Take 1 capsule (20 mg) by mouth daily Take along with 60 mg for a total of 80 mg, Disp: 90 capsule, Rfl: 3     DULoxetine (CYMBALTA) 60 MG capsule, TAKE 1 CAPSULE BY MOUTH EVERY DAY, Disp: 90 capsule, Rfl: 3     famotidine (PEPCID) 40 MG tablet, TAKE 1 TABLET BY MOUTH TWICE A DAY, Disp: 180 tablet, Rfl: 1     fish oil-omega-3 fatty acids 1000 MG capsule, Take  1 g by mouth daily  (Patient not taking: Reported on 1/24/2022), Disp: , Rfl:      gabapentin (NEURONTIN) 300 MG capsule, Take 2 tablets (600 MG) by mouth 3-4 times daily., Disp: , Rfl:      hydrochlorothiazide (HYDRODIURIL) 12.5 MG tablet, Take 1 tablet (12.5 mg) by mouth daily, Disp: 90 tablet, Rfl: 3     latanoprost (XALATAN) 0.005 % ophthalmic solution, INSTILL 1 DROP INTO BOTH EYES EVERY DAY AS DIRECTED PT WILL NEED TO BE SEEN FOR FUTURE REFILLS, Disp: , Rfl:      levothyroxine (SYNTHROID/LEVOTHROID) 75 MCG tablet, Take 1 tablet (75 mcg) by mouth daily, Disp: 90 tablet, Rfl: 1     nicotine (NICOTROL) 10 MG inhaler, Use 1 cartridge as needed for urge to smoke by puffing over course of 20min.  Use 6-16 cart/day; reduce number of cart/day over 6-12 weeks., Disp: 168 each, Rfl: 3     NICOTROL 10 MG inhaler, Inhale 1 Cartridge into the lungs daily as needed for smoking cessation , Disp: , Rfl: 1     nitroFURantoin macrocrystal-monohydrate (MACROBID) 100 MG capsule, Take 1 capsule (100 mg) by mouth daily, Disp: 90 capsule, Rfl: 1     oxybutynin (DITROPAN-XL) 10 MG 24 hr tablet, Take 1 tablet (10 mg) by mouth 2 times daily, Disp: 30 tablet, Rfl:      polyethylene glycol (MIRALAX) 17 GM/Dose powder, Take 17 g by mouth daily, Disp: 510 g, Rfl: 0     sennosides (SENOKOT) 8.6 MG tablet, Take 1 tablet by mouth daily, Disp: 30 tablet, Rfl: 0     sulfamethoxazole-trimethoprim (BACTRIM DS) 800-160 MG tablet, Take 1 tablet by mouth 2 times daily, Disp: 14 tablet, Rfl: 0     valACYclovir (VALTREX) 500 MG tablet, Take 1 tablet (500 mg) by mouth 2 times daily, Disp: 12 tablet, Rfl: 5    Allergies  Allergies   Allergen Reactions     Amantadine      hallucinations     Budesonide      Symbicort Rash     Family History  family history includes Cancer in her maternal grandfather; Heart Disease in her father, maternal grandmother, mother, and paternal grandfather.  She has positive family of autoimmune disease. Her mom has lupus and  hypopituitarism. No family history of thyroid disease.  Maternal grandfather had throat cancer. Type 1 diabetes - maternal grandmother. Diabetes - maternal aunt.     Social History  , 2 children.  She denies drinking alcohol or using illicit drugs.  History of long-term smoking.  Social History     Tobacco Use     Smoking status: Former Smoker     Packs/day: 0.25     Types: Cigarettes     Smokeless tobacco: Former User     Quit date: 10/31/2018   Substance Use Topics     Alcohol use: Yes     Alcohol/week: 0.0 standard drinks     Comment: 1/wk     Drug use: No        Physical Exam   BP Readings from Last 6 Encounters:   01/31/22 115/69   01/24/22 123/63   07/09/21 126/70   07/02/21 115/57   06/10/21 110/65   04/14/21 115/65     LMP  (LMP Unknown)   Body mass index is 19.34 kg/m .     General appearance: she is well-developed, well-nourished, and in no distress.  Eyes: conjunctivae and extraocular motions are normal. Pupils are equal, round, and reactive to light. No lid lag, no stare.  HENT: oropharynx clear and moist; neck no JVD, no bruits, no thyromegaly, no palpable nodules  Cardiovascular: regular rhythm, no murmurs, distal pulses palpable, no edema  Respiratory: chest clear, no rales, no rhonchi  Neurologic: Absent knee reflexes, no resting tremor  Psychiatric: affect and judgment normal   Skin: warm, no lesions    DATA REVIEW    TSH   Date Value Ref Range Status   11/05/2021 1.33 0.40 - 4.00 mU/L Final   06/30/2021 191.68 (H) 0.40 - 4.00 mU/L Final     T4 Total   Date Value Ref Range Status   02/28/2011 7.6 4.7 - 13.3 ug/dl Final     T4 Free   Date Value Ref Range Status   06/30/2021 0.67 (L) 0.76 - 1.46 ng/dL Final     Free T4   Date Value Ref Range Status   11/05/2021 1.15 0.76 - 1.46 ng/dL Final       ASSESSMENT/PLAN:     1.  Osteoporosis  The diagnosis is based on the prior history of femoral fractures after falling from a standing height, in 2009 and 2018.  The DEXA scan confirmed very low  T-scores.  Risk factors for osteoporosis identified: Postmenopausal status, sedentary lifestyle due to MS, prior treatment with PPI, hypothyroidism, smoking.    From 6/2020 until 2021, the patient was medicated with Fosamax (for ~1 year).  Screening for celiac disease was negative.  Recommendations:  Continue to take the combined calcium and vitamin D supplements and the MVI   Pending the above labwork, the plan is to start treatment with zoledronic acid (Reclast).  I rediscussed with the patient about side effects.  Advised to maintain a good hydration the day she receives the infusion.      2.  Borderline cortisol on the dexamethasone suppression test from December 2021  I recommended to check salivary cortisol.    3.  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  The patient has her thyroid hormone levels checked frequently through her neurology clinic.  Advised to contact us if the numbers are outside normal range.    4. Thyroid nodules, with no ultrasound features suggestive of malignancy  I recommended to continue to monitor them clinically.    Orders Placed This Encounter   Procedures     Cortisol Saliva     Again, thank you for allowing me to participate in the care of your patient.      Sincerely,    Neli Bean MD

## 2022-01-31 NOTE — PROGRESS NOTES
1. Graves disease   Marylee follows up with Dr. Redman for MS, at [facility].  The patient has had the thyroid hormone levels checked quite frequently, through her neurology clinic, given prior history of treatment with [place], in [id number].  The patient was formally diagnosed with Graves' disease in October 2020.  Subsequently, she underwent treatment with 9.3 mCi I-131, on 2/9/2021.  Prior to treatment, the scan revealed a 24-hour uptake of 87.5%, with a computer estimated weight of 42.2 g.  The radiotracer uptake was uniform, with the exception of the caudal region in the inferior left thyroid lobe, which corresponded to the nodule which was biopsied in 2017.     In 2017, she was incidentally diagnosed with thyroid nodules during a CAT scan.  The thyroid ultrasound from 9/7/2017 revealed multiple thyroid nodules.  The left inferior #4 thyroid nodule, measuring 2.2 cm, was benign on biopsy. Overall, the nodules had a benign ultrasound appearance, mostly spongiform.  The biopsied nodule had an internal macrocalcification, minimal internal vascularity, but no definite features suggestive of malignancy.  The patient has no prior history of radiation exposure or a family history of thyroid disease.  On the follow-up thyroid ultrasound from 1/14/2021, there were no significant changes of the thyroid nodules and the thyroid gland was very vascular.    She was started on treatment with levothyroxine on 6/10/2021, at 25 mg daily for 1 week, then 2 tablets daily for 1 week, followed by a total daily dose of 75 mcg daily (since 6/28/21), which she continues to take.Most recent TSH was 1.33, on 11/5/21.  As per patient, she continues to have the thyroid hormone levels checked through her neurology clinic on a monthly basis.    On questioning, she denies palpitations, tremor, significant hair loss or dry skin.  Her weight has been stable.  She does endorse a longstanding history of fatigue,  constipation cold intolerance.  The dryness of the eyes got worse following the cataract surgery from last June.  Intermittently, for the last year, certain food products like lasagna get stuck in the epigastric area.  She rarely experiences heartburns.  Denies dysphagia, voice hoarseness or cough.    2. Osteoporosis     Prior fractures:  2009 Right nondisplaced intertrochanteric fracture.  2013 Left tibial and fibular fracture   10/2018 Transverse fracture of the distal left femoral diaphysis, after falling from standing height.  6/2020 Left tibial plateau fracture     Treatment with Fosamax was recommended by her orthopedic surgeon, in 2018. As per patient, she started Fosamax in 2020 she took it for approximately 1 year.  For the last 5-6 years, she has been using a wheelchair.  She is not aware of decreased height over the years.  She does not drink milk and she only has cheese, intermittently.  Treatment with omeprazole was discontinued and it was replaced by Pepcid.  Marylee smokes since age 17, with intermittent breaks, generally less than half a pack a day.  She quit smoking this month.  She has both upper and lower dentures.    The following calcium and vitamin D supplements were recommended.:  600 mg calcium +800 units vitamin D BID  A multivitamin which contains 700 units vitamin D daily  The patient is not sure of the current calcium and vitamin D dosages but she is going to contact us back when she gets home, if the dosages are different.    On the DEXA scan images from 11/5/2021, L2-L4 T score was -5.3, left femoral neck T score was -5.1, total left femur T score -6, 33% distal radius T score -3.5.  The abdominal CT images from June 2021 revealed questionable small bilateral adrenal nodularity.    Pertinent labs reviewed:  12/21 negative tissue transglutaminase antibodies, 1 mg dexamethasone suppression test revealed a cortisol level 2.2, with an appropriate dexamethasone level.    11/5/21   Vitamin D  77, ACTH<10, cortisol 7.9  8/12/21 calcium 9.5, GFR 54, vitamin D 67, phos 3.4, albumin 3.6, PTH 96 (18-80).  The vitamin D supplement was discontinued a couple of months prior to this lab work.  7/3/2020 calcium 10  2011 cortisol 23.2  GFR in the 40s  9/11/2018  Vitamin D 83.4  Calcium 9.2    Past Medical History  Past Medical History:   Diagnosis Date     Gastro-oesophageal reflux disease      Multiple sclerosis (H) 1990s   May 2020, diagnosed with Covid  Depression  Pancytopenia dating back to at least 2014  Hyperlipidemia  Lung nodules  Candida esophagitis  UTI  R intertrochanteric femoral fracture 2009   Left tibial and fibular fracture in 2013  Left tibial plateau fracture June 2020   Left femoral fracture in 10/2018, after falling from standing height (above the knee)    Medications    Current Outpatient Medications:      acetaminophen (TYLENOL) 500 MG tablet, Take 1-2 tablets (500-1,000 mg) by mouth every 8 hours as needed for mild pain or fever (greater than 101 degrees), Disp: , Rfl:      baclofen (LIORESAL) 10 MG tablet, Take 2 tablets (20 MG) by mouth every morning, 1 tablet (10 MG) by mouth daily in the afternoon as needed, and 3 tablets (30 MG) by mouth every evening before bedtime., Disp: , Rfl:      DULoxetine (CYMBALTA) 20 MG capsule, Take 1 capsule (20 mg) by mouth daily Take along with 60 mg for a total of 80 mg, Disp: 90 capsule, Rfl: 3     DULoxetine (CYMBALTA) 60 MG capsule, TAKE 1 CAPSULE BY MOUTH EVERY DAY, Disp: 90 capsule, Rfl: 3     famotidine (PEPCID) 40 MG tablet, TAKE 1 TABLET BY MOUTH TWICE A DAY, Disp: 180 tablet, Rfl: 1     fish oil-omega-3 fatty acids 1000 MG capsule, Take 1 g by mouth daily  (Patient not taking: Reported on 1/24/2022), Disp: , Rfl:      gabapentin (NEURONTIN) 300 MG capsule, Take 2 tablets (600 MG) by mouth 3-4 times daily., Disp: , Rfl:      hydrochlorothiazide (HYDRODIURIL) 12.5 MG tablet, Take 1 tablet (12.5 mg) by mouth daily, Disp: 90 tablet, Rfl: 3      latanoprost (XALATAN) 0.005 % ophthalmic solution, INSTILL 1 DROP INTO BOTH EYES EVERY DAY AS DIRECTED PT WILL NEED TO BE SEEN FOR FUTURE REFILLS, Disp: , Rfl:      levothyroxine (SYNTHROID/LEVOTHROID) 75 MCG tablet, Take 1 tablet (75 mcg) by mouth daily, Disp: 90 tablet, Rfl: 1     nicotine (NICOTROL) 10 MG inhaler, Use 1 cartridge as needed for urge to smoke by puffing over course of 20min.  Use 6-16 cart/day; reduce number of cart/day over 6-12 weeks., Disp: 168 each, Rfl: 3     NICOTROL 10 MG inhaler, Inhale 1 Cartridge into the lungs daily as needed for smoking cessation , Disp: , Rfl: 1     nitroFURantoin macrocrystal-monohydrate (MACROBID) 100 MG capsule, Take 1 capsule (100 mg) by mouth daily, Disp: 90 capsule, Rfl: 1     oxybutynin (DITROPAN-XL) 10 MG 24 hr tablet, Take 1 tablet (10 mg) by mouth 2 times daily, Disp: 30 tablet, Rfl:      polyethylene glycol (MIRALAX) 17 GM/Dose powder, Take 17 g by mouth daily, Disp: 510 g, Rfl: 0     sennosides (SENOKOT) 8.6 MG tablet, Take 1 tablet by mouth daily, Disp: 30 tablet, Rfl: 0     sulfamethoxazole-trimethoprim (BACTRIM DS) 800-160 MG tablet, Take 1 tablet by mouth 2 times daily, Disp: 14 tablet, Rfl: 0     valACYclovir (VALTREX) 500 MG tablet, Take 1 tablet (500 mg) by mouth 2 times daily, Disp: 12 tablet, Rfl: 5    Allergies  Allergies   Allergen Reactions     Amantadine      hallucinations     Budesonide      Symbicort Rash     Family History  family history includes Cancer in her maternal grandfather; Heart Disease in her father, maternal grandmother, mother, and paternal grandfather.  She has positive family of autoimmune disease. Her mom has lupus and hypopituitarism. No family history of thyroid disease.  Maternal grandfather had throat cancer. Type 1 diabetes - maternal grandmother. Diabetes - maternal aunt.     Social History  , 2 children.  She denies drinking alcohol or using illicit drugs.  History of long-term smoking.  Social History      Tobacco Use     Smoking status: Former Smoker     Packs/day: 0.25     Types: Cigarettes     Smokeless tobacco: Former User     Quit date: 10/31/2018   Substance Use Topics     Alcohol use: Yes     Alcohol/week: 0.0 standard drinks     Comment: 1/wk     Drug use: No        Physical Exam   BP Readings from Last 6 Encounters:   01/31/22 115/69   01/24/22 123/63   07/09/21 126/70   07/02/21 115/57   06/10/21 110/65   04/14/21 115/65     LMP  (LMP Unknown)   Body mass index is 19.34 kg/m .     General appearance: she is well-developed, well-nourished, and in no distress.  Eyes: conjunctivae and extraocular motions are normal. Pupils are equal, round, and reactive to light. No lid lag, no stare.  HENT: oropharynx clear and moist; neck no JVD, no bruits, no thyromegaly, no palpable nodules  Cardiovascular: regular rhythm, no murmurs, distal pulses palpable, no edema  Respiratory: chest clear, no rales, no rhonchi  Neurologic: Absent knee reflexes, no resting tremor  Psychiatric: affect and judgment normal   Skin: warm, no lesions    DATA REVIEW    TSH   Date Value Ref Range Status   11/05/2021 1.33 0.40 - 4.00 mU/L Final   06/30/2021 191.68 (H) 0.40 - 4.00 mU/L Final     T4 Total   Date Value Ref Range Status   02/28/2011 7.6 4.7 - 13.3 ug/dl Final     T4 Free   Date Value Ref Range Status   06/30/2021 0.67 (L) 0.76 - 1.46 ng/dL Final     Free T4   Date Value Ref Range Status   11/05/2021 1.15 0.76 - 1.46 ng/dL Final       ASSESSMENT/PLAN:     1.  Osteoporosis  The diagnosis is based on the prior history of femoral fractures after falling from a standing height, in 2009 and 2018.  The DEXA scan confirmed very low T-scores.  Risk factors for osteoporosis identified: Postmenopausal status, sedentary lifestyle due to MS, prior treatment with PPI, hypothyroidism, smoking.    From 6/2020 until 2021, the patient was medicated with Fosamax (for ~1 year).  Screening for celiac disease was negative.  Recommendations:  Continue  to take the combined calcium and vitamin D supplements and the MVI   Pending the above labwork, the plan is to start treatment with zoledronic acid (Reclast).  I rediscussed with the patient about side effects.  Advised to maintain a good hydration the day she receives the infusion.      2.  Borderline cortisol on the dexamethasone suppression test from December 2021  I recommended to check salivary cortisol.    3.  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  The patient has her thyroid hormone levels checked frequently through her neurology clinic.  Advised to contact us if the numbers are outside normal range.    4. Thyroid nodules, with no ultrasound features suggestive of malignancy  I recommended to continue to monitor them clinically.    Orders Placed This Encounter   Procedures     Cortisol Saliva

## 2022-01-31 NOTE — PATIENT INSTRUCTIONS
Instructions for Collecting Saliva Samples     Do not eat a major meal within 60 minutes prior to sample collection.  Avoid eating dairy products during the 30 minutes prior to sample collection. Avoid acidic and high sugar foods.  Avoid alcohol consumption 24 hours prior to collection. No caffeinated products for 2 hours before sampling.  Do not apply creams or lotions that contain steroids immediately prior to collection (this is to avoid contamination of the salivette).  Avoid activities that can cause the gums to bleed for 3 hours prior to collection. This would include brushing or flowing your teeth. Do not collect a sample if the gums or the inside of the mouth is bleeding. Blood in the saliva will invalidate the measurment of Saliva Cortisol.  Wash the mouth out with water 15 minutes before collecting the sample.     If there is bleeding in the mouth DO NOT complete the test.     Using a Salivette     1.  Remove cylindrical shaped swab from the insert and place in the mouth.  2.  The swab should be chewed for 30-45 seconds or until one can no longer prevent swallowing excess saliva (swab is saturated).  3.  If the swab cannot be chewed it can be placed under the tongue for 30-45 seconds or until well saturated.  4.  After the swab is saturated, it is returned to the Salivette and the stopper is replaced immediately.   5.  Record time of collection on the test request form and on Salivette transport container.  Store sample in the refrigerator.

## 2022-02-03 ENCOUNTER — ANCILLARY PROCEDURE (OUTPATIENT)
Dept: MAMMOGRAPHY | Facility: CLINIC | Age: 67
End: 2022-02-03
Attending: NURSE PRACTITIONER
Payer: MEDICARE

## 2022-02-03 DIAGNOSIS — N64.4 MASTALGIA: ICD-10-CM

## 2022-02-03 PROCEDURE — 76642 ULTRASOUND BREAST LIMITED: CPT | Mod: RT | Performed by: RADIOLOGY

## 2022-02-03 PROCEDURE — 77066 DX MAMMO INCL CAD BI: CPT | Performed by: RADIOLOGY

## 2022-02-03 PROCEDURE — G0279 TOMOSYNTHESIS, MAMMO: HCPCS | Performed by: RADIOLOGY

## 2022-02-04 PROCEDURE — 82533 TOTAL CORTISOL: CPT | Mod: 90 | Performed by: PATHOLOGY

## 2022-02-04 PROCEDURE — 99000 SPECIMEN HANDLING OFFICE-LAB: CPT | Performed by: PATHOLOGY

## 2022-02-07 LAB — CORTIS SAL-MCNC: 0.62 UG/DL

## 2022-02-07 NOTE — RESULT ENCOUNTER NOTE
The salivary cortisol test was contaminated by blood and, as a result, uninterpretable.  The blood may be a consequence of you wearing dentures.  Considering that the cortisol level was only borderline elevated on the prior testing, I recommend to pursue treatment for osteoporosis and monitor the cortisol levels with dexamethasone suppression tests, on an yearly basis.  It looks like you are scheduled to receive the Reclast infusion this month.

## 2022-02-11 ENCOUNTER — ANCILLARY PROCEDURE (OUTPATIENT)
Dept: MAMMOGRAPHY | Facility: CLINIC | Age: 67
End: 2022-02-11
Attending: NURSE PRACTITIONER
Payer: MEDICARE

## 2022-02-11 DIAGNOSIS — R92.8 OTHER ABNORMAL AND INCONCLUSIVE FINDINGS ON DIAGNOSTIC IMAGING OF BREAST: ICD-10-CM

## 2022-02-11 DIAGNOSIS — N63.0 BREAST MASS: ICD-10-CM

## 2022-02-11 DIAGNOSIS — F33.1 MODERATE EPISODE OF RECURRENT MAJOR DEPRESSIVE DISORDER (H): ICD-10-CM

## 2022-02-11 DIAGNOSIS — E89.0 POSTABLATIVE HYPOTHYROIDISM: ICD-10-CM

## 2022-02-11 PROCEDURE — 88342 IMHCHEM/IMCYTCHM 1ST ANTB: CPT | Mod: 26 | Performed by: PATHOLOGY

## 2022-02-11 PROCEDURE — 88305 TISSUE EXAM BY PATHOLOGIST: CPT | Mod: 26 | Performed by: PATHOLOGY

## 2022-02-11 PROCEDURE — 88341 IMHCHEM/IMCYTCHM EA ADD ANTB: CPT | Mod: 26 | Performed by: PATHOLOGY

## 2022-02-11 PROCEDURE — G0279 TOMOSYNTHESIS, MAMMO: HCPCS | Mod: GC | Performed by: RADIOLOGY

## 2022-02-11 PROCEDURE — 88305 TISSUE EXAM BY PATHOLOGIST: CPT | Mod: TC | Performed by: NURSE PRACTITIONER

## 2022-02-11 PROCEDURE — 77066 DX MAMMO INCL CAD BI: CPT | Mod: GC | Performed by: RADIOLOGY

## 2022-02-11 PROCEDURE — 19083 BX BREAST 1ST LESION US IMAG: CPT | Mod: LT | Performed by: RADIOLOGY

## 2022-02-11 RX ORDER — IOPAMIDOL 612 MG/ML
100 INJECTION, SOLUTION INTRAVASCULAR ONCE
Status: COMPLETED | OUTPATIENT
Start: 2022-02-11 | End: 2022-02-11

## 2022-02-11 RX ORDER — DULOXETIN HYDROCHLORIDE 60 MG/1
CAPSULE, DELAYED RELEASE ORAL
Qty: 90 CAPSULE | Refills: 3 | OUTPATIENT
Start: 2022-02-11

## 2022-02-11 RX ADMIN — IOPAMIDOL 90 ML: 612 INJECTION, SOLUTION INTRAVASCULAR at 10:16

## 2022-02-11 NOTE — TELEPHONE ENCOUNTER
Refused; filled for one year 1/24/22.    Dasia Ferreira RN  [facility]

## 2022-02-12 ENCOUNTER — HEALTH MAINTENANCE LETTER (OUTPATIENT)
Age: 67
End: 2022-02-12

## 2022-02-14 LAB
PATH REPORT.COMMENTS IMP SPEC: NORMAL
PATH REPORT.FINAL DX SPEC: NORMAL
PATH REPORT.GROSS SPEC: NORMAL
PATH REPORT.MICROSCOPIC SPEC OTHER STN: NORMAL
PATH REPORT.RELEVANT HX SPEC: NORMAL
PHOTO IMAGE: NORMAL

## 2022-02-15 RX ORDER — LEVOTHYROXINE SODIUM 75 UG/1
75 TABLET ORAL DAILY
Qty: 90 TABLET | Refills: 3 | Status: SHIPPED | OUTPATIENT
Start: 2022-02-15 | End: 2022-02-25

## 2022-02-21 ENCOUNTER — TELEPHONE (OUTPATIENT)
Dept: MAMMOGRAPHY | Facility: CLINIC | Age: 67
End: 2022-02-21
Payer: MEDICARE

## 2022-02-21 NOTE — TELEPHONE ENCOUNTER
Left a message for Marylee regarding availability of her breast biopsy results.  Awaiting return phone call.  Call back number left was [phone].

## 2022-02-22 ENCOUNTER — PATIENT OUTREACH (OUTPATIENT)
Dept: ONCOLOGY | Facility: CLINIC | Age: 67
End: 2022-02-22
Payer: MEDICARE

## 2022-02-22 ENCOUNTER — TELEPHONE (OUTPATIENT)
Dept: MAMMOGRAPHY | Facility: CLINIC | Age: 67
End: 2022-02-22
Payer: MEDICARE

## 2022-02-22 DIAGNOSIS — N60.92 ATYPICAL DUCTAL HYPERPLASIA OF LEFT BREAST: Primary | ICD-10-CM

## 2022-02-22 NOTE — TELEPHONE ENCOUNTER
Spoke to Marylee about new finding of Atypical Ductal Hyperplasia found in her left breast. We discussed the Radiologist's recommendation of surgical consultation.  A referral has been placed and someone from their office will reach out to help coordinate the appointment.  Marylee verbalized understanding and all questions and concerns were answered at this time.

## 2022-02-22 NOTE — PROGRESS NOTES
New Patient Oncology Nurse Navigator Note     Referring provider: Maru Tipton NP     Referring Clinic/Organization:  in [facility]     Referred to (specialty:) Breast surgeon      Date Referral Received: February 22, 2022     Evaluation for:  Benign breast condition     Clinical History (per Nurse review of records provided):    Marylee had a bilateral diagnostic mammogram and bilateral ultrasound on 2/3/22.  She reports breast pain at multiple sites over most of the left breast but unable to pinpoint a specific site at ultrasound. There is a partially obscured mass 3:00 mid breast on the left by mammography. Otherwise there is no significant change. Focused ultrasound demonstrates a 5 x 5 x 5 mm solid appearing mass with a slightly indistinct margin at the 3:00 position 4 cm from nipple.    2/11/22 - Contrast enhanced mammogram showed a mild bilateral parenchymal enhancement. At the 3:00 position mid depth of the left breast there is enhancement corresponding to the mass visualized on mammography. It measures 9 mm in longest diameter. No suspicious enhancement of the right breast.    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 There is associated epithelial hyperplasia, which is atypical.    Records Location: See Bookmarked material     Writer received referral, reviewed for appropriate plan, and sent to New Patient Scheduling for completion.

## 2022-02-24 ENCOUNTER — INFUSION THERAPY VISIT (OUTPATIENT)
Dept: INFUSION THERAPY | Facility: CLINIC | Age: 67
End: 2022-02-24
Attending: INTERNAL MEDICINE
Payer: MEDICARE

## 2022-02-24 ENCOUNTER — LAB (OUTPATIENT)
Dept: LAB | Facility: CLINIC | Age: 67
End: 2022-02-24
Attending: INTERNAL MEDICINE
Payer: MEDICARE

## 2022-02-24 VITALS
OXYGEN SATURATION: 98 % | DIASTOLIC BLOOD PRESSURE: 73 MMHG | RESPIRATION RATE: 16 BRPM | TEMPERATURE: 98.1 F | HEART RATE: 70 BPM | SYSTOLIC BLOOD PRESSURE: 108 MMHG

## 2022-02-24 VITALS — WEIGHT: 130 LBS | HEIGHT: 69 IN | BODY MASS INDEX: 19.26 KG/M2

## 2022-02-24 DIAGNOSIS — M81.0 OSTEOPOROSIS OF MULTIPLE SITES: Primary | ICD-10-CM

## 2022-02-24 LAB
CALCIUM SERPL-MCNC: 9.2 MG/DL (ref 8.5–10.1)
CREAT SERPL-MCNC: 1.04 MG/DL (ref 0.52–1.04)
GFR SERPL CREATININE-BSD FRML MDRD: 59 ML/MIN/1.73M2

## 2022-02-24 PROCEDURE — 96365 THER/PROPH/DIAG IV INF INIT: CPT

## 2022-02-24 PROCEDURE — 82565 ASSAY OF CREATININE: CPT | Performed by: INTERNAL MEDICINE

## 2022-02-24 PROCEDURE — 36415 COLL VENOUS BLD VENIPUNCTURE: CPT | Performed by: INTERNAL MEDICINE

## 2022-02-24 PROCEDURE — 250N000011 HC RX IP 250 OP 636: Performed by: INTERNAL MEDICINE

## 2022-02-24 PROCEDURE — 82310 ASSAY OF CALCIUM: CPT | Performed by: INTERNAL MEDICINE

## 2022-02-24 RX ORDER — ZOLEDRONIC ACID 5 MG/100ML
5 INJECTION, SOLUTION INTRAVENOUS ONCE
Status: COMPLETED | OUTPATIENT
Start: 2022-02-24 | End: 2022-02-24

## 2022-02-24 RX ORDER — ZOLEDRONIC ACID 5 MG/100ML
5 INJECTION, SOLUTION INTRAVENOUS ONCE
Status: CANCELLED
Start: 2022-02-24 | End: 2022-02-24

## 2022-02-24 RX ADMIN — ZOLEDRONIC ACID 5 MG: 0.05 INJECTION, SOLUTION INTRAVENOUS at 09:26

## 2022-02-24 ASSESSMENT — PAIN SCALES - GENERAL: PAINLEVEL: NO PAIN (0)

## 2022-02-24 NOTE — PROGRESS NOTES
"Nursing Note  Marylee Woods presents [facility] for:   Chief Complaint   Patient presents with     Infusion     zoledronic Acid (RECLAST)     During today's [facility] appointment, orders from Neli Bean MD were completed.  Frequency: once    Progress note:  Patient identification verified by name and date of birth.  Assessment completed.  Vitals recorded in Doc Flowsheets.  Patient was provided with education regarding medication/procedure and possible side effects.  Patient verbalized understanding.     present during visit today: Not Applicable.    Treatment Conditions: Patient monitored for 20 minutes after medication was administered. First dose.    Premedications: were not ordered.    Drug Waste Record: No    Infusion length and rate:  infusion given over approximately 40 minutes    Labs: drawn today, prior to appointment in second floor lab.    Vascular access: peripheral IV was placed prior to appointment at [facility] on second floor lab.      Post Infusion Assessment:  Patient tolerated infusion without incident.     Discharge Plan:   Follow up plan of care with: primary care provider,  Discharge instructions were reviewed with patient.  Patient/representative verbalized understanding of discharge instructions and all questions answered.  Patient discharged from [facility] in stable condition.    Sarah Gale RN    Administrations This Visit     zoledronic Acid (RECLAST) infusion 5 mg     Admin Date  02/24/2022 Action  New Bag Dose  5 mg Rate  150 mL/hr Route  Intravenous Administered By  Lena Edwards, HEIDY                Ht 1.746 m (5' 8.74\")   Wt 59 kg (130 lb)   LMP  (LMP Unknown)   BMI 19.34 kg/m        "

## 2022-02-24 NOTE — LETTER
2/24/2022         RE: Marylee Woods  [address]  [facility] [address]        Dear Colleague,    Thank you for referring your patient, Marylee Woods, to the [facility]. Please see a copy of my visit note below.    Nursing Note  Marylee Woods presents [facility] for:   Chief Complaint   Patient presents with     Infusion     zoledronic Acid (RECLAST)     During [facility]'s [facility] appointment, orders from Neli Bean MD were completed.  Frequency: once    Progress note:  Patient identification verified by name and date of birth.  Assessment completed.  Vitals recorded in Doc Flowsheets.  Patient was provided with education regarding medication/procedure and possible side effects.  Patient verbalized understanding.     present during visit today: Not Applicable.    Treatment Conditions: Patient monitored for 20 minutes after medication was administered. First dose.    Premedications: were not ordered.    Drug Waste Record: No    Infusion length and rate:  infusion given over approximately 40 minutes    Labs: drawn today, prior to appointment in second floor lab.    Vascular access: peripheral IV was placed prior to appointment at [facility] on second floor lab.      Post Infusion Assessment:  Patient tolerated infusion without incident.     Discharge Plan:   Follow up plan of care with: primary care provider,  Discharge instructions were reviewed with patient.  Patient/representative verbalized understanding of discharge instructions and all questions answered.  Patient discharged from [facility] in stable condition.    Sarah Gale RN    Administrations This Visit     zoledronic Acid (RECLAST) infusion 5 mg     Admin Date  02/24/2022 Action  New Bag Dose  5 mg Rate  150 mL/hr Route  Intravenous  "Administered By  Lena Edwards, RN                 1.746 m (5' 8.74\")   Wt 59 kg (130 lb)   LMP  (LMP Unknown)   BMI 19.34 kg/m            Again, thank you for allowing me to participate in the care of your patient.      Sincerely,    [facility]    "

## 2022-02-24 NOTE — PATIENT INSTRUCTIONS
Patient Education     Patient Education    Zoledronic Acid Solution for injection    Zoledronic Acid Solution for injection [Hypercalcemia of Malignancy]    Zoledronic Acid Solution for injection [Pagets Disease]  Zoledronic Acid Solution for injection  What is this medicine?  ZOLEDRONIC ACID (CRISTI le dron ik AS id) lowers the amount of calcium loss from bone. It is used to treat Paget's disease and osteoporosis in women.  This medicine may be used for other purposes; ask your health care provider or pharmacist if you have questions.  What should I tell my health care provider before I take this medicine?  They need to know if you have any of these conditions:    aspirin-sensitive asthma    cancer, especially if you are receiving medicines used to treat cancer    dental disease or wear dentures    infection    kidney disease    low levels of calcium in the blood    past surgery on the parathyroid gland or intestines    receiving corticosteroids like dexamethasone or prednisone    an unusual or allergic reaction to zoledronic acid, other medicines, foods, dyes, or preservatives    pregnant or trying to get pregnant    breast-feeding  How should I use this medicine?  This medicine is for infusion into a vein. It is given by a health care professional in a hospital or clinic setting.  Talk to your pediatrician regarding the use of this medicine in children. This medicine is not approved for use in children.  Overdosage: If you think you have taken too much of this medicine contact a poison control center or emergency room at once.  NOTE: This medicine is only for you. Do not share this medicine with others.  What if I miss a dose?  It is important not to miss your dose. Call your doctor or health care professional if you are unable to keep an appointment.  What may interact with this medicine?    certain antibiotics given by injection    NSAIDs, medicines for pain and inflammation, like ibuprofen or naproxen    some  diuretics like bumetanide, furosemide    teriparatide  This list may not describe all possible interactions. Give your health care provider a list of all the medicines, herbs, non-prescription drugs, or dietary supplements you use. Also tell them if you smoke, drink alcohol, or use illegal drugs. Some items may interact with your medicine.  What should I watch for while using this medicine?  Visit your doctor or health care professional for regular checkups. It may be some time before you see the benefit from this medicine. Do not stop taking your medicine unless your doctor tells you to. Your doctor may order blood tests or other tests to see how you are doing.  Women should inform their doctor if they wish to become pregnant or think they might be pregnant. There is a potential for serious side effects to an unborn child. Talk to your health care professional or pharmacist for more information.  You should make sure that you get enough calcium and vitamin D while you are taking this medicine. Discuss the foods you eat and the vitamins you take with your health care professional.  Some people who take this medicine have severe bone, joint, and/or muscle pain. This medicine may also increase your risk for jaw problems or a broken thigh bone. Tell your doctor right away if you have severe pain in your jaw, bones, joints, or muscles. Tell your doctor if you have any pain that does not go away or that gets worse.  Tell your dentist and dental surgeon that you are taking this medicine. You should not have major dental surgery while on this medicine. See your dentist to have a dental exam and fix any dental problems before starting this medicine. Take good care of your teeth while on this medicine. Make sure you see your dentist for regular follow-up appointments.  What side effects may I notice from receiving this medicine?  Side effects that you should report to your doctor or health care professional as soon as  possible:    allergic reactions like skin rash, itching or hives, swelling of the face, lips, or tongue    anxiety, confusion, or depression    breathing problems    changes in vision    eye pain    feeling faint or lightheaded, falls    jaw pain, especially after dental work    mouth sores    muscle cramps, stiffness, or weakness    redness, blistering, peeling or loosening of the skin, including inside the mouth    trouble passing urine or change in the amount of urine  Side effects that usually do not require medical attention (report to your doctor or health care professional if they continue or are bothersome):    bone, joint, or muscle pain    constipation    diarrhea    fever    hair loss    irritation at site where injected    loss of appetite    nausea, vomiting    stomach upset    trouble sleeping    trouble swallowing    weak or tired  This list may not describe all possible side effects. Call your doctor for medical advice about side effects. You may report side effects to FDA at 1-015-FDA-5512.  Where should I keep my medicine?  This drug is given in a hospital or clinic and will not be stored at home.  NOTE:This sheet is a summary. It may not cover all possible information. If you have questions about this medicine, talk to your doctor, pharmacist, or health care provider. Copyright  2016 Gold Standard

## 2022-02-24 NOTE — NURSING NOTE
Chief Complaint   Patient presents with     Blood Draw     Labs drawn via PIV placed by RN in lab. VS taken.      Nick Nash RN     None

## 2022-02-25 ENCOUNTER — TELEPHONE (OUTPATIENT)
Dept: ENDOCRINOLOGY | Facility: CLINIC | Age: 67
End: 2022-02-25
Payer: MEDICARE

## 2022-02-25 DIAGNOSIS — E89.0 POSTABLATIVE HYPOTHYROIDISM: ICD-10-CM

## 2022-02-25 RX ORDER — LEVOTHYROXINE SODIUM 75 UG/1
75 TABLET ORAL DAILY
Qty: 90 TABLET | Refills: 3 | Status: SHIPPED | OUTPATIENT
Start: 2022-02-25 | End: 2023-01-20

## 2022-02-25 NOTE — TELEPHONE ENCOUNTER
[facility] Call Center    Phone Message    May a detailed message be left on voicemail: yes     Reason for Call: Medication Question or concern regarding medication   Prescription Clarification  Name of Medication: levothyroxine (SYNTHROID/LEVOTHROID) 75 MCG tablet     Prescribing Provider: Dr. Bean   Pharmacy: [facility] in [place]   What on the order needs clarification? Pt called and stated that her [facility] pharmacy will not transfer this script over to [facility], pt needing the clinic to send it           Action Taken: Message routed to:  [facility] ([facility] ghassan

## 2022-02-25 NOTE — TELEPHONE ENCOUNTER
Review and recommendation from Dr Bean mailed to Pt.via usps.   Lisa Huber RN on 2/25/2022 at 8:29 AM

## 2022-02-25 NOTE — TELEPHONE ENCOUNTER
levothyroxine (SYNTHROID/LEVOTHROID) 75 MCG tablet  90 Tabs, 3 Refills sent to updated/alternative pharmacy for Pt care.       Neli Ellis RN  Central Triage Red Flags/Med Refills

## 2022-03-17 ENCOUNTER — ONCOLOGY VISIT (OUTPATIENT)
Dept: ONCOLOGY | Facility: CLINIC | Age: 67
End: 2022-03-17
Attending: NURSE PRACTITIONER
Payer: MEDICARE

## 2022-03-17 ENCOUNTER — PATIENT OUTREACH (OUTPATIENT)
Dept: ONCOLOGY | Facility: CLINIC | Age: 67
End: 2022-03-17

## 2022-03-17 VITALS
TEMPERATURE: 98.2 F | HEART RATE: 77 BPM | RESPIRATION RATE: 18 BRPM | DIASTOLIC BLOOD PRESSURE: 68 MMHG | OXYGEN SATURATION: 100 % | SYSTOLIC BLOOD PRESSURE: 116 MMHG

## 2022-03-17 DIAGNOSIS — G35 MS (MULTIPLE SCLEROSIS) (H): ICD-10-CM

## 2022-03-17 DIAGNOSIS — Z11.59 ENCOUNTER FOR SCREENING FOR OTHER VIRAL DISEASES: Primary | ICD-10-CM

## 2022-03-17 DIAGNOSIS — N60.92 ATYPICAL DUCTAL HYPERPLASIA OF LEFT BREAST: ICD-10-CM

## 2022-03-17 DIAGNOSIS — N60.92 ATYPICAL DUCTAL HYPERPLASIA OF LEFT BREAST: Primary | ICD-10-CM

## 2022-03-17 PROCEDURE — 99204 OFFICE O/P NEW MOD 45 MIN: CPT | Performed by: SURGERY

## 2022-03-17 PROCEDURE — G0463 HOSPITAL OUTPT CLINIC VISIT: HCPCS

## 2022-03-17 RX ORDER — CEFAZOLIN SODIUM 2 G/50ML
2 SOLUTION INTRAVENOUS SEE ADMIN INSTRUCTIONS
Status: CANCELLED | OUTPATIENT
Start: 2022-03-17

## 2022-03-17 RX ORDER — ACETAMINOPHEN 325 MG/1
975 TABLET ORAL ONCE
Status: CANCELLED | OUTPATIENT
Start: 2022-03-17 | End: 2022-03-17

## 2022-03-17 RX ORDER — CEFAZOLIN SODIUM 2 G/50ML
2 SOLUTION INTRAVENOUS
Status: CANCELLED | OUTPATIENT
Start: 2022-03-17

## 2022-03-17 ASSESSMENT — PAIN SCALES - GENERAL: PAINLEVEL: NO PAIN (0)

## 2022-03-17 NOTE — TELEPHONE ENCOUNTER
FUTURE VISIT INFORMATION      SURGERY INFORMATION:    Date: 3/31/2022    [place] [facility] [place]    Surgeon:  Amanda Liu MD    Anesthesia Type:  Combined General with Block    Procedure: LEFT Radiofrequency Identification Seed-Localized Lumpectomy    Consult: 3/17/22    RECORDS REQUESTED FROM:       Primary Care Provider: Carolina Pulliam MD ([facility])     Pertinent Medical History: Anemia, CKD    Most recent EKG+ Tracin/3/2020    Most recent Sleep Study: 17

## 2022-03-17 NOTE — PROGRESS NOTES
RN CARE COORDINATION    Met with Marylee and her  following her visit with Dr. Liu on 3/17/2022. Introduced self and explained role of RNCC. Reviewed plan for LEFT LUMPECTOMY WITH TAGE PLACEMENT. and tentative location for surgery. Provided appropriate OR location map(s). Discussed pre-op requirements including H&P and Covid test. Confirmed Marylee has not had Covid within the last 90 days. Reviewed shower instructions and provided written hand-out and bottle of surgical scrub.     Informed patient they should get a call within the next five business days from our OR  with surgery date, H&P date, date of Covid test and date of post-op visit with their surgeon. Writer answered all questions and concerns to the best of her ability and provided her contact information. Reviewed use of [facility] as alternative way to contact team.  Encouraged patient to contact with questions.         REVA Reyes, RN  RN Care Coordinator  [facility]  Surgical Onolcogy      Approximately 20 minutes was spent in discussion with patient.

## 2022-03-17 NOTE — PROGRESS NOTES
NEW SURGICAL CONSULTATION  Mar 17, 2022    Marylee Woods is a 66 year old woman who presents with a left  breast complaint.  She was referred by Maru Tipton NP.    HPI:    She noted pain in the entire LEFT breast, particularly the left upper outer quadrant.  It started in the left axilla in January 2022.  She drinks about 18 oz caffeinated pop per day.    Imaging showed a 5 mm mass at 3:00 4 cm FN in the LEFT breast.    A biopsy was performed and a clip was placed.  It showed atypical ductal hyperplasia in a papilloma.      BREAST-SPECIFIC HISTORY:  Prior breast surgeries: No  Prior radiation history: No  Bra size: 34 C  Dominant hand: Right    FAMILY HISTORY:  Breast ca: No - unsure about paternal family history  Ovarian ca: No  Pancreatic ca: No  Melanoma: No  Gastric ca: No  Colon ca: No   Other cancer: No MGF w/ throat ca    Past Medical History:   Diagnosis Date     Gastro-oesophageal reflux disease      Multiple sclerosis (H)    Left sided weakness   Numbness  Mainly in wheelchair - minimally ambulates at home.  No MI, CVA, DM    Past Surgical History:   Procedure Laterality Date     C/SECTION, LOW TRANSVERSE  1992     COLONOSCOPY  2007     COLONOSCOPY N/A 1/26/2017    Procedure: COMBINED COLONOSCOPY, SINGLE OR MULTIPLE BIOPSY/POLYPECTOMY BY BIOPSY;  Surgeon: Mayo Adkins MD, MD;  Location:  GI     ESOPHAGOSCOPY, GASTROSCOPY, DUODENOSCOPY (EGD), COMBINED  1/26/2017    Dr. Adkins [facility]     ESOPHAGOSCOPY, GASTROSCOPY, DUODENOSCOPY (EGD), COMBINED N/A 1/26/2017    Procedure: COMBINED ESOPHAGOSCOPY, GASTROSCOPY, DUODENOSCOPY (EGD), BIOPSY SINGLE OR MULTIPLE;  Surgeon: Mayo Adkins MD, MD;  Location:  GI     HIP SURGERY  2009    femur ortho surgery     LAPAROSCOPIC CHOLECYSTECTOMY  6/24/2014    Procedure: LAPAROSCOPIC CHOLECYSTECTOMY;  Surgeon: Randy Bailey MD;  Location: [facility]     OPEN REDUCTION INTERNAL FIXATION FEMUR DISTAL Left 11/1/2018    Procedure: OPEN REDUCTION INTERNAL FIXATION LEFT  FEMUR DISTAL;  Surgeon: Jose Valadez MD;  Location: [facility] OR     OPEN REDUCTION INTERNAL FIXATION RODDING INTRAMEDULLARY TIBIA  4/14/2013    Procedure: OPEN REDUCTION INTERNAL FIXATION RODDING INTRAMEDULLARY TIBIA;;  Surgeon: Sajan Cast MD;  Location: [facility] OR     ORTHOPEDIC SURGERY  2009    surgery right upper femur fx near hip   No GA issues    Current Outpatient Medications   Medication Sig Dispense Refill     acetaminophen (TYLENOL) 500 MG tablet Take 1-2 tablets (500-1,000 mg) by mouth every 8 hours as needed for mild pain or fever (greater than 101 degrees)       baclofen (LIORESAL) 10 MG tablet Take 2 tablets (20 MG) by mouth every morning, 1 tablet (10 MG) by mouth daily in the afternoon as needed, and 3 tablets (30 MG) by mouth every evening before bedtime.       DULoxetine (CYMBALTA) 20 MG capsule Take 1 capsule (20 mg) by mouth daily Take along with 60 mg for a total of 80 mg 90 capsule 3     DULoxetine (CYMBALTA) 60 MG capsule TAKE 1 CAPSULE BY MOUTH EVERY DAY 90 capsule 3     famotidine (PEPCID) 40 MG tablet TAKE 1 TABLET BY MOUTH TWICE A  tablet 1     fish oil-omega-3 fatty acids 1000 MG capsule Take 1 g by mouth daily        gabapentin (NEURONTIN) 300 MG capsule Take 2 tablets (600 MG) by mouth 3-4 times daily.       hydrochlorothiazide (HYDRODIURIL) 12.5 MG tablet Take 1 tablet (12.5 mg) by mouth daily 90 tablet 3     latanoprost (XALATAN) 0.005 % ophthalmic solution INSTILL 1 DROP INTO BOTH EYES EVERY DAY AS DIRECTED PT WILL NEED TO BE SEEN FOR FUTURE REFILLS       levothyroxine (SYNTHROID/LEVOTHROID) 75 MCG tablet Take 1 tablet (75 mcg) by mouth daily 90 tablet 3     nicotine (NICOTROL) 10 MG inhaler Use 1 cartridge as needed for urge to smoke by puffing over course of 20min.  Use 6-16 cart/day; reduce number of cart/day over 6-12 weeks. 168 each 3     NICOTROL 10 MG inhaler Inhale 1 Cartridge into the lungs daily as needed for smoking cessation   1     nitroFURantoin  macrocrystal-monohydrate (MACROBID) 100 MG capsule Take 1 capsule (100 mg) by mouth daily 90 capsule 1     oxybutynin (DITROPAN-XL) 10 MG 24 hr tablet Take 1 tablet (10 mg) by mouth 2 times daily 30 tablet      polyethylene glycol (MIRALAX) 17 GM/Dose powder Take 17 g by mouth daily 510 g 0     sennosides (SENOKOT) 8.6 MG tablet Take 1 tablet by mouth daily 30 tablet 0     sulfamethoxazole-trimethoprim (BACTRIM DS) 800-160 MG tablet Take 1 tablet by mouth 2 times daily 14 tablet 0     valACYclovir (VALTREX) 500 MG tablet Take 1 tablet (500 mg) by mouth 2 times daily 12 tablet 5           Allergies   Allergen Reactions     Amantadine      hallucinations     Budesonide      Symbicort Rash      SOCIAL HISTORY:  Smokes: No - quit cigarettes Jan 2022; occasional cigars    She does not currently work.    ROS:  Easy bruising/bleeding: No    /68   Pulse 77   Temp 98.2  F (36.8  C) (Oral)   Resp 18   LMP  (LMP Unknown)   SpO2 100%    Physical Exam  Constitutional:       Appearance: She is well-developed.   Pulmonary:      Effort: No respiratory distress.   Chest:   Breasts: Breasts are symmetrical.      Right: No inverted nipple, mass, nipple discharge, skin change, tenderness, axillary adenopathy or supraclavicular adenopathy.      Left: No inverted nipple, mass, nipple discharge, skin change, tenderness, axillary adenopathy or supraclavicular adenopathy.        Comments: Patient was examined in the upright position only in her wheelchair.  Lymphadenopathy:      Cervical: No cervical adenopathy.      Right cervical: No superficial, deep or posterior cervical adenopathy.     Left cervical: No superficial, deep or posterior cervical adenopathy.      Upper Body:      Right upper body: No supraclavicular, axillary or pectoral adenopathy.      Left upper body: No supraclavicular, axillary or pectoral adenopathy.      Comments: No lymphedema in bilateral upper extremities.   Skin:     General: Skin is warm and dry.         INVESTIGATIONS:    Contrast-Enhanced Mammogram (2/11/2022) showed:  Standard Mammogram Findings. Redemonstration of a partially obscured, rounded, isodense mass at 3:00 portion of the left breast mid depth. No suspicious right breast finding.  Contrast Findings: There is mild bilateral parenchymal enhancement. At the 3:00 position mid depth of the left breast there is enhancement corresponding to the mass visualized on mammography. It measures 9 mm in longest diameter. No suspicious enhancement of the right breast.  IMPRESSION: BI-RADS CATEGORY: 4 - Suspicious Abnormality-Biopsy Should Be Considered.    Diagnostic Mammogram & Ultrasound (2/3/2022) showed:  BREAST DENSITY: Heterogeneously dense.  Findings: There is a partially obscured mass 3:00 mid breast on the left by mammography. Otherwise there is no significant change.  Focused ultrasound by radiologist and technologist demonstrates a 5 x 5 x 5 mm solid appearing mass with a slightly indistinct margin at the 3:00 position 4 cm from nipple.  IMPRESSION: BI-RADS CATEGORY: 4 - Suspicious Abnormality-Biopsy Should Be Considered.    Biopsy (2/11/2022) showed:  Final Diagnosis   LEFT breast, 3:00, 4 cm from nipple, ultrasound guided core biopsy:  -Atypical ductal hyperplasia (ADH) involving a sclerosed papillary lesion  -Calcifications associated with sclerotic stroma  -Negative for invasive carcinoma     ASSESSMENT:  Marylee Woods is a 66 year old woman with atypical ductal hyperplasia.    I personally reviewed the imaging above     I reviewed the imaging and diagnosis with Marylee Woods.  The natural history of atypical ductal hyperplasia were discussed with the patient and her .  This is a proliferative benign lesion of the breast.  An excision following the core needle biopsy is typically recommended for diagnostic purposes because there are a small percentage of atypical ductal hyperplasia that are upgraded to non-invasive or invasive cancer  following excision.  Because the lesion is not palpable, a wire-localized approach will be taken.  She would present on the day of surgery for an image-guided wire placement, followed by a surgical excision in the operating room.  It is a same day surgery. The risks of a wire-localized lumpectomy were discussed with the patient and family, including the risks of bleeding, wound infection, wound dehiscence, and post-operative contour change to the breast.  We discussed that surgical pathology results will be reviewed at the postoperative visit to allow for careful discussion of next steps and for answering questions.    We also reviewed the alternative of watchful waiting, which would involve interval breast imaging in 6 months.    We also reviewed that atypical ductal hyperplasia slightly increases her baseline risk for breast cancer.  After ruling out associated malignancy, a referral to our high risk clinic is recommended.    All questions were answered.  She did seem to understand the above.  Marylee Woods elected to proceed with LEFT RFID  Seed-localized lumpectomy.    Total time spent with the patient was 45minutes, of which 75% was counseling.     PLAN:  1. LEFT RFID seed-localized lumpectomy  2. Patient to report to her PCP for preoperative H&P and testing.    Amanda Liu MD MSc [facility] FACS  Assistant Professor of Surgery  Division of Surgical Oncology  [facility]      50 minutes spent on the date of the encounter doing chart review, review of test results, interpretation of tests, patient visit, documentation and discussion with family.

## 2022-03-17 NOTE — LETTER
3/17/2022         RE: Marylee Woods  [address]  [facility] [address]        Dear Colleague,    Thank you for referring your patient, Marylee Woods, to the [facility]. Please see a copy of my visit note below.    NEW SURGICAL CONSULTATION  Mar 17, 2022    Marylee Woods is a 66 year old woman who presents with a left  breast complaint.  She was referred by Maru Tipton NP.    HPI:    She noted pain in the entire LEFT breast, particularly the left upper outer quadrant.  It started in the left axilla in January 2022.  She drinks about 18 oz caffeinated pop per day.    Imaging showed a 5 mm mass at 3:00 4 cm FN in the LEFT breast.    A biopsy was performed and a clip was placed.  It showed atypical ductal hyperplasia in a papilloma.      BREAST-SPECIFIC HISTORY:  Prior breast surgeries: No  Prior radiation history: No  Bra size: 34 C  Dominant hand: Right    FAMILY HISTORY:  Breast ca: No - unsure about paternal family history  Ovarian ca: No  Pancreatic ca: No  Melanoma: No  Gastric ca: No  Colon ca: No   Other cancer: No MGF w/ throat ca    Past Medical History:   Diagnosis Date     Gastro-oesophageal reflux disease      Multiple sclerosis (H)    Left sided weakness   Numbness  Mainly in wheelchair - minimally ambulates at home.  No MI, CVA, DM    Past Surgical History:   Procedure Laterality Date     C/SECTION, LOW TRANSVERSE  1992     COLONOSCOPY  2007     COLONOSCOPY N/A 1/26/2017    Procedure: COMBINED COLONOSCOPY, SINGLE OR MULTIPLE BIOPSY/POLYPECTOMY BY BIOPSY;  Surgeon: Mayo Adkins MD, MD;  Location:  GI     ESOPHAGOSCOPY, GASTROSCOPY, DUODENOSCOPY (EGD), COMBINED  1/26/2017    Dr. Adkins [facility]     ESOPHAGOSCOPY, GASTROSCOPY, DUODENOSCOPY (EGD), COMBINED N/A 1/26/2017    Procedure: COMBINED ESOPHAGOSCOPY, GASTROSCOPY, DUODENOSCOPY (EGD), BIOPSY SINGLE OR MULTIPLE;  Surgeon: Mayo Adkins MD, MD;  Location:  GI     HIP SURGERY  2009    femur ortho surgery      LAPAROSCOPIC CHOLECYSTECTOMY  6/24/2014    Procedure: LAPAROSCOPIC CHOLECYSTECTOMY;  Surgeon: Randy Bailey MD;  Location: [facility]     OPEN REDUCTION INTERNAL FIXATION FEMUR DISTAL Left 11/1/2018    Procedure: OPEN REDUCTION INTERNAL FIXATION LEFT FEMUR DISTAL;  Surgeon: Jose Valadez MD;  Location: [facility]     OPEN REDUCTION INTERNAL FIXATION RODDING INTRAMEDULLARY TIBIA  4/14/2013    Procedure: OPEN REDUCTION INTERNAL FIXATION RODDING INTRAMEDULLARY TIBIA;;  Surgeon: Sajan Cast MD;  Location: [facility] OR     ORTHOPEDIC SURGERY  2009    surgery right upper femur fx near hip   No GA issues    Current Outpatient Medications   Medication Sig Dispense Refill     acetaminophen (TYLENOL) 500 MG tablet Take 1-2 tablets (500-1,000 mg) by mouth every 8 hours as needed for mild pain or fever (greater than 101 degrees)       baclofen (LIORESAL) 10 MG tablet Take 2 tablets (20 MG) by mouth every morning, 1 tablet (10 MG) by mouth daily in the afternoon as needed, and 3 tablets (30 MG) by mouth every evening before bedtime.       DULoxetine (CYMBALTA) 20 MG capsule Take 1 capsule (20 mg) by mouth daily Take along with 60 mg for a total of 80 mg 90 capsule 3     DULoxetine (CYMBALTA) 60 MG capsule TAKE 1 CAPSULE BY MOUTH EVERY DAY 90 capsule 3     famotidine (PEPCID) 40 MG tablet TAKE 1 TABLET BY MOUTH TWICE A  tablet 1     fish oil-omega-3 fatty acids 1000 MG capsule Take 1 g by mouth daily        gabapentin (NEURONTIN) 300 MG capsule Take 2 tablets (600 MG) by mouth 3-4 times daily.       hydrochlorothiazide (HYDRODIURIL) 12.5 MG tablet Take 1 tablet (12.5 mg) by mouth daily 90 tablet 3     latanoprost (XALATAN) 0.005 % ophthalmic solution INSTILL 1 DROP INTO BOTH EYES EVERY DAY AS DIRECTED PT WILL NEED TO BE SEEN FOR FUTURE REFILLS       levothyroxine (SYNTHROID/LEVOTHROID) 75 MCG tablet Take 1 tablet (75 mcg) by mouth daily 90 tablet 3     nicotine (NICOTROL) 10 MG inhaler Use 1 cartridge as  needed for urge to smoke by puffing over course of 20min.  Use 6-16 cart/day; reduce number of cart/day over 6-12 weeks. 168 each 3     NICOTROL 10 MG inhaler Inhale 1 Cartridge into the lungs daily as needed for smoking cessation   1     nitroFURantoin macrocrystal-monohydrate (MACROBID) 100 MG capsule Take 1 capsule (100 mg) by mouth daily 90 capsule 1     oxybutynin (DITROPAN-XL) 10 MG 24 hr tablet Take 1 tablet (10 mg) by mouth 2 times daily 30 tablet      polyethylene glycol (MIRALAX) 17 GM/Dose powder Take 17 g by mouth daily 510 g 0     sennosides (SENOKOT) 8.6 MG tablet Take 1 tablet by mouth daily 30 tablet 0     sulfamethoxazole-trimethoprim (BACTRIM DS) 800-160 MG tablet Take 1 tablet by mouth 2 times daily 14 tablet 0     valACYclovir (VALTREX) 500 MG tablet Take 1 tablet (500 mg) by mouth 2 times daily 12 tablet 5           Allergies   Allergen Reactions     Amantadine      hallucinations     Budesonide      Symbicort Rash      SOCIAL HISTORY:  Smokes: No - quit cigarettes Jan 2022; occasional cigars    She does not currently work.    ROS:  Easy bruising/bleeding: No    /68   Pulse 77   Temp 98.2  F (36.8  C) (Oral)   Resp 18   LMP  (LMP Unknown)   SpO2 100%    Physical Exam  Constitutional:       Appearance: She is well-developed.   Pulmonary:      Effort: No respiratory distress.   Chest:   Breasts: Breasts are symmetrical.      Right: No inverted nipple, mass, nipple discharge, skin change, tenderness, axillary adenopathy or supraclavicular adenopathy.      Left: No inverted nipple, mass, nipple discharge, skin change, tenderness, axillary adenopathy or supraclavicular adenopathy.        Comments: Patient was examined in the upright position only in her wheelchair.  Lymphadenopathy:      Cervical: No cervical adenopathy.      Right cervical: No superficial, deep or posterior cervical adenopathy.     Left cervical: No superficial, deep or posterior cervical adenopathy.      Upper Body:       Right upper body: No supraclavicular, axillary or pectoral adenopathy.      Left upper body: No supraclavicular, axillary or pectoral adenopathy.      Comments: No lymphedema in bilateral upper extremities.   Skin:     General: Skin is warm and dry.        INVESTIGATIONS:    Contrast-Enhanced Mammogram (2/11/2022) showed:  Standard Mammogram Findings. Redemonstration of a partially obscured, rounded, isodense mass at 3:00 portion of the left breast mid depth. No suspicious right breast finding.  Contrast Findings: There is mild bilateral parenchymal enhancement. At the 3:00 position mid depth of the left breast there is enhancement corresponding to the mass visualized on mammography. It measures 9 mm in longest diameter. No suspicious enhancement of the right breast.  IMPRESSION: BI-RADS CATEGORY: 4 - Suspicious Abnormality-Biopsy Should Be Considered.    Diagnostic Mammogram & Ultrasound (2/3/2022) showed:  BREAST DENSITY: Heterogeneously dense.  Findings: There is a partially obscured mass 3:00 mid breast on the left by mammography. Otherwise there is no significant change.  Focused ultrasound by radiologist and technologist demonstrates a 5 x 5 x 5 mm solid appearing mass with a slightly indistinct margin at the 3:00 position 4 cm from nipple.  IMPRESSION: BI-RADS CATEGORY: 4 - Suspicious Abnormality-Biopsy Should Be Considered.    Biopsy (2/11/2022) showed:  Final Diagnosis   LEFT breast, 3:00, 4 cm from nipple, ultrasound guided core biopsy:  -Atypical ductal hyperplasia (ADH) involving a sclerosed papillary lesion  -Calcifications associated with sclerotic stroma  -Negative for invasive carcinoma     ASSESSMENT:  Marylee Woods is a 66 year old woman with atypical ductal hyperplasia.    I personally reviewed the imaging above     I reviewed the imaging and diagnosis with Marylee Woods.  The natural history of atypical ductal hyperplasia were discussed with the patient and her .  This is a proliferative  benign lesion of the breast.  An excision following the core needle biopsy is typically recommended for diagnostic purposes because there are a small percentage of atypical ductal hyperplasia that are upgraded to non-invasive or invasive cancer following excision.  Because the lesion is not palpable, a wire-localized approach will be taken.  She would present on the day of surgery for an image-guided wire placement, followed by a surgical excision in the operating room.  It is a same day surgery. The risks of a wire-localized lumpectomy were discussed with the patient and family, including the risks of bleeding, wound infection, wound dehiscence, and post-operative contour change to the breast.  We discussed that surgical pathology results will be reviewed at the postoperative visit to allow for careful discussion of next steps and for answering questions.    We also reviewed the alternative of watchful waiting, which would involve interval breast imaging in 6 months.    We also reviewed that atypical ductal hyperplasia slightly increases her baseline risk for breast cancer.  After ruling out associated malignancy, a referral to our high risk clinic is recommended.    All questions were answered.  She did seem to understand the above.  Marylee Woods elected to proceed with LEFT RFID  Seed-localized lumpectomy.    Total time spent with the patient was 45minutes, of which 75% was counseling.     PLAN:  1. LEFT RFID seed-localized lumpectomy  2. Patient to report to her PCP for preoperative H&P and testing.    Amanda Liu MD MSc formerly [facility] FACS  Assistant Professor of Surgery  Division of Surgical Oncology  [facility]      50 minutes spent on the date of the encounter doing chart review, review of test results, interpretation of tests, patient visit, documentation and discussion with family.        Again, thank you for allowing me to participate in the care of your patient.      Sincerely,    Amanda Liu,  MD

## 2022-03-17 NOTE — PROGRESS NOTES
RN Care Coordinator: Sarah Giraldo; [phone]    Surgery is scheduled with Dr. Amanda Liu  on 3/31/2022 [facility].  Scheduled per case request orders.    H&P to be completed by PAC   Future Appointments   Date Time Provider Department Center   3/23/2022  2:30 PM Judith Ruiz PA-C [facility]                COVID-19 test: TBD - will monitor to ensure scheduled.     Post-op: will be scheduled by the clinic 4/14 at 715 am.     Patient will receive surgery arrival and start time from PAC.    Patient will wait for call from Central Scheduling to schedule COVID-19 test.    I left a detailed voicemail regarding the scheduled information and asked for a call back. Provided direct line. Will watch for call back from patient.     The surgery packet was sent via [employer].     Requested patient call back to discuss timing of tag placement. Patient can have same day or schedule tag placement a few days prior.       03/18/2022 11:57 AM - Called and verified all surgery dates and information. Informed her that we will send information via [employer] but I will also request packet is mailed.     She agreed with plan and understands she will be contacted to schedule covid testing.         Tag scheduled for 3/28 at the breast Center.

## 2022-03-22 DIAGNOSIS — Z79.899 NEED FOR PROPHYLACTIC CHEMOTHERAPY: Primary | ICD-10-CM

## 2022-03-23 ENCOUNTER — ANESTHESIA EVENT (OUTPATIENT)
Dept: SURGERY | Facility: AMBULATORY SURGERY CENTER | Age: 67
End: 2022-03-23
Payer: MEDICARE

## 2022-03-23 ENCOUNTER — VIRTUAL VISIT (OUTPATIENT)
Dept: SURGERY | Facility: CLINIC | Age: 67
End: 2022-03-23
Payer: MEDICARE

## 2022-03-23 ENCOUNTER — PRE VISIT (OUTPATIENT)
Dept: SURGERY | Facility: CLINIC | Age: 67
End: 2022-03-23

## 2022-03-23 DIAGNOSIS — Z01.818 PREOP EXAMINATION: Primary | ICD-10-CM

## 2022-03-23 PROCEDURE — 99204 OFFICE O/P NEW MOD 45 MIN: CPT | Mod: 95 | Performed by: PHYSICIAN ASSISTANT

## 2022-03-23 RX ORDER — IBUPROFEN 200 MG
2 CAPSULE ORAL AT BEDTIME
Status: ON HOLD | COMMUNITY
End: 2024-04-09

## 2022-03-23 RX ORDER — MULTIPLE VITAMINS W/ MINERALS TAB 9MG-400MCG
1 TAB ORAL EVERY EVENING
Status: ON HOLD | COMMUNITY
End: 2024-04-09

## 2022-03-23 ASSESSMENT — LIFESTYLE VARIABLES: TOBACCO_USE: 1

## 2022-03-23 ASSESSMENT — ENCOUNTER SYMPTOMS: SEIZURES: 0

## 2022-03-23 ASSESSMENT — PAIN SCALES - GENERAL: PAINLEVEL: NO PAIN (0)

## 2022-03-23 NOTE — H&P
Pre-Operative H & P     CC:  Preoperative exam to assess for increased cardiopulmonary risk while undergoing surgery and anesthesia.    Date of Encounter: 3/23/2022  Primary Care Physician:  Carolina Pulliam     Reason for Visit: Atypical ductal hyperplasia of left breast    HPI  Marylee Woods is a 66 year old female who presents for pre-operative H & P in preparation for  Procedure Information     Case: [id number] Date/Time: 03/31/22 1150    Procedure: LEFT Radiofrequency Identification Seed-Localized Lumpectomy (Left Breast)    Anesthesia type: Combined General with Block    Diagnosis: Atypical ductal hyperplasia of left breast [N60.92]    Pre-op diagnosis: Atypical ductal hyperplasia of left breast [N60.92]    Location: [place] [address] / [facility]    Providers: Amanda Liu MD          Patient is being evaluated for comorbid conditions of HTN, hx smoking, Graves disease s/p ablation w/ secondary hypothyroidism, multiple sclerosis, depression, mild CKD, osteoporosis, GERD.    Ms. Marinelli recently presented with pain in the entire LEFT breast, particularly the left upper outer quadrant.  Imaging showed a 5 mm mass at 3:00 4 cm FN in the LEFT breast. A biopsy was performed and a clip was placed.  It showed atypical ductal hyperplasia in a papilloma. She now presents for the above procedure.    History was obtained from patient & chart review.     Hx of abnormal bleeding or anti-platelet use: denies    Menstrual history: No LMP recorded (lmp unknown). Patient is postmenopausal.:       Past Medical History  Past Medical History:   Diagnosis Date     Atypical ductal hyperplasia of left breast 02/2022     Depression      Gastro-oesophageal reflux disease      Graves disease      Multiple sclerosis (H)      Osteoporosis      Postablative hypothyroidism        Past Surgical History  Past Surgical History:   Procedure Laterality Date     C/SECTION, LOW TRANSVERSE  1992      COLONOSCOPY  2007     COLONOSCOPY N/A 1/26/2017    Procedure: COMBINED COLONOSCOPY, SINGLE OR MULTIPLE BIOPSY/POLYPECTOMY BY BIOPSY;  Surgeon: Mayo Adkins MD, MD;  Location:  GI     ESOPHAGOSCOPY, GASTROSCOPY, DUODENOSCOPY (EGD), COMBINED  1/26/2017    Dr. Adkins [facility]     ESOPHAGOSCOPY, GASTROSCOPY, DUODENOSCOPY (EGD), COMBINED N/A 1/26/2017    Procedure: COMBINED ESOPHAGOSCOPY, GASTROSCOPY, DUODENOSCOPY (EGD), BIOPSY SINGLE OR MULTIPLE;  Surgeon: Mayo Adkins MD, MD;  Location:  GI     HIP SURGERY  2009    femur ortho surgery     LAPAROSCOPIC CHOLECYSTECTOMY  6/24/2014    Procedure: LAPAROSCOPIC CHOLECYSTECTOMY;  Surgeon: Randy Bailey MD;  Location: [facility]     OPEN REDUCTION INTERNAL FIXATION FEMUR DISTAL Left 11/1/2018    Procedure: OPEN REDUCTION INTERNAL FIXATION LEFT FEMUR DISTAL;  Surgeon: Jose Valadez MD;  Location: [facility]     OPEN REDUCTION INTERNAL FIXATION RODDING INTRAMEDULLARY TIBIA  4/14/2013    Procedure: OPEN REDUCTION INTERNAL FIXATION RODDING INTRAMEDULLARY TIBIA;;  Surgeon: Sajan Cast MD;  Location: [facility] OR     ORTHOPEDIC SURGERY  2009    surgery right upper femur fx near hip       Prior to Admission Medications  Current Outpatient Medications   Medication Sig Dispense Refill     acetaminophen (TYLENOL) 500 MG tablet Take 1-2 tablets (500-1,000 mg) by mouth every 8 hours as needed for mild pain or fever (greater than 101 degrees)       baclofen (LIORESAL) 10 MG tablet 2 times daily Take 2 tablets (20 MG) by mouth every morning, 1 tablet (10 MG) by mouth daily in the afternoon as needed, and 3 tablets (30 MG) by mouth every evening before bedtime.       calcium carbonate 500 mg, elemental, (OSCAL 500) 1250 (500 Ca) MG TABS tablet Take by mouth every evening       DULoxetine (CYMBALTA) 20 MG capsule Take 1 capsule (20 mg) by mouth daily Take along with 60 mg for a total of 80 mg (Patient taking differently: Take 20 mg by mouth every morning Take along with 60 mg  for a total of 80 mg) 90 capsule 3     DULoxetine (CYMBALTA) 60 MG capsule TAKE 1 CAPSULE BY MOUTH EVERY DAY (Patient taking differently: Take 60 mg by mouth every morning TAKE 1 CAPSULE BY MOUTH EVERY DAY) 90 capsule 3     famotidine (PEPCID) 40 MG tablet TAKE 1 TABLET BY MOUTH TWICE A DAY (Patient taking differently: Take 40 mg by mouth At Bedtime ) 180 tablet 1     gabapentin (NEURONTIN) 300 MG capsule Take 300 mg by mouth 2 times daily Take 2 tablets (600 MG) by mouth 3-4 times daily.       hydrochlorothiazide (HYDRODIURIL) 12.5 MG tablet Take 1 tablet (12.5 mg) by mouth daily (Patient taking differently: Take 12.5 mg by mouth every morning ) 90 tablet 3     latanoprost (XALATAN) 0.005 % ophthalmic solution INSTILL 1 DROP INTO BOTH EYES EVERY DAY AS DIRECTED PT WILL NEED TO BE SEEN FOR FUTURE REFILLS       levothyroxine (SYNTHROID/LEVOTHROID) 75 MCG tablet Take 1 tablet (75 mcg) by mouth daily (Patient taking differently: Take 75 mcg by mouth every morning ) 90 tablet 3     multivitamin w/minerals (MULTI-VITAMIN) tablet Take 1 tablet by mouth every evening       nitroFURantoin macrocrystal-monohydrate (MACROBID) 100 MG capsule Take 1 capsule (100 mg) by mouth daily (Patient taking differently: Take 100 mg by mouth every morning ) 90 capsule 1     oxybutynin (DITROPAN-XL) 10 MG 24 hr tablet Take 1 tablet (10 mg) by mouth 2 times daily 30 tablet      polyethylene glycol (MIRALAX) 17 GM/Dose powder Take 17 g by mouth daily (Patient taking differently: Take 17 g by mouth every morning ) 510 g 0     sennosides (SENOKOT) 8.6 MG tablet Take 1 tablet by mouth daily (Patient taking differently: Take 1 tablet by mouth every morning ) 30 tablet 0     valACYclovir (VALTREX) 500 MG tablet Take 1 tablet (500 mg) by mouth 2 times daily 12 tablet 5       Allergies  Allergies   Allergen Reactions     Amantadine      hallucinations     Budesonide      Symbicort Rash       Social History  Social History     Socioeconomic History      Marital status:      Spouse name: Not on file     Number of children: Not on file     Years of education: Not on file     Highest education level: Not on file   Occupational History     Not on file   Tobacco Use     Smoking status: Former Smoker     Packs/day: 0.25     Types: Cigarettes     Quit date: 2022     Years since quittin.2     Smokeless tobacco: Former User     Tobacco comment: Smoked on & off since age 17, < 1/2 ppd   Substance and Sexual Activity     Alcohol use: Yes     Alcohol/week: 0.0 standard drinks     Comment: 1/wk     Drug use: No     Sexual activity: Yes     Partners: Male   Other Topics Concern     Parent/sibling w/ CABG, MI or angioplasty before 65F 55M? Not Asked   Social History Narrative     Not on file     Social Determinants of Health     Financial Resource Strain: Not on file   Food Insecurity: Not on file   Transportation Needs: Not on file   Physical Activity: Not on file   Stress: Not on file   Social Connections: Not on file   Intimate Partner Violence: Not on file   Housing Stability: Not on file       Family History  Family History   Problem Relation Age of Onset     Heart Disease Maternal Grandmother      Cancer Maternal Grandfather      Heart Disease Paternal Grandfather      Heart Disease Mother      Heart Disease Father      Diabetes No family hx of      Coronary Artery Disease No family hx of      Hypertension No family hx of      Hyperlipidemia No family hx of      Cerebrovascular Disease No family hx of      Breast Cancer No family hx of      Colon Cancer No family hx of      Prostate Cancer No family hx of      Other Cancer No family hx of      Depression No family hx of      Anxiety Disorder No family hx of      Mental Illness No family hx of      Substance Abuse No family hx of      Anesthesia Reaction No family hx of      Asthma No family hx of      Osteoporosis No family hx of      Genetic Disorder No family hx of      Thyroid Disease No family hx of       Obesity No family hx of      Unknown/Adopted No family hx of        Review of Systems  The complete review of systems is negative other than noted in the HPI or here.     Anesthesia Evaluation   Pt has had prior anesthetic.     No history of anesthetic complications       ROS/MED HX  ENT/Pulmonary: Comment: Has another sleep study on 3/28/22 @ [facility] to assess CPAP machine - may not be working efficiently.    Denies pulmonary involvement w/ MS.      (+) sleep apnea, uses CPAP, tobacco use, Past use,  (-) asthma   Neurologic:     (+) Multiple Sclerosis, limitations: last took MS meds in 2019. Has LE weakness.  (-) no seizures and no CVA   Cardiovascular:     (+) hypertension-----Previous cardiac testing   Echo: Date: Results:    Stress Test: Date: Results:    ECG Reviewed: Date: 2018 Results:  Sinus rhythm   Normal ECG   When compared with ECG of 17-MAY-2016 19:34, No significant change was found  Ventricular rate 66 bpm    Cath: Date: Results:      METS/Exercise Tolerance: 1 - Eating, dressing Comment: Able to walk 30 feet (due to MS)   Hematologic:  - neg hematologic  ROS  (-) history of blood clots and history of blood transfusion   Musculoskeletal: Comment: Taking baclofen for spasticity in legs, L>R.    Osteoporosis        GI/Hepatic: Comment: Mildly symptomatic on Pepcid. Takes TUMS is symptoms for severe.    Takes Miralax & MagCitrate for constipation      (+) GERD,  (-) liver disease   Renal/Genitourinary: Comment: Urinary incontinence, wears protective underwear.    Creatinine 1.04, GFR 59 on 2/24/22        Endo:     (+) thyroid problem, hypothyroidism s/p ablation for Graves disease,  (-) Type II DM   Psychiatric/Substance Use:     (+) psychiatric history depression     Infectious Disease:  - neg infectious disease ROS     Malignancy:  - neg malignancy ROS     Other:      (+) , other significant disability Wheelchair bound (Able to stand w/ walker, needs help getting legs onto bed),           Virtual visit -  No vitals were obtained    Physical Exam  Constitutional: Awake, alert, cooperative, no apparent distress, and appears stated age.  HENT: Normocephalic  Respiratory: non labored breathing   Neurologic: Awake, alert, oriented to name, place and time.   Neuropsychiatric: Calm, cooperative. Normal affect.      Prior Labs/Diagnostic Studies   All labs and imaging personally reviewed     EKG  - please see in ROS above     PATHOLOGY 2/11/22  Pathology results:  LEFT breast, 3:00, 4 cm from nipple, ultrasound guided core biopsy:  -Atypical ductal hyperplasia (ADH) involving a sclerosed papillary  lesion  -Calcifications associated with sclerotic stroma  -Negative for invasive carcinoma  -See comment        The patient's records and results personally reviewed by this provider.       Assessment      Marylee Woods is a 66 year old female seen as a PAC referral for risk assessment and optimization for anesthesia.    Plan/Recommendations  Pt will be optimized for the proposed procedure.  See below for details on the assessment, risk, and preoperative recommendations    NEUROLOGY  - No history of TIA, CVA or seizure  - Multiple sclerosis, last took MS meds in 2019. Has LE weakness, primarily wheelchair bound. Able to stand w/ walker, walk ~ 30 feet, needs help getting legs onto bed    ENT  - No current airway concerns.  Will need to be reassessed day of surgery.  Mallampati: Unable to assess  TM: > 3    CARDIAC  - HTN, will hold hydrochlorothiazide on DOS  - METS (Metabolic Equivalents) 1    RCRI-Very low risk: Class 1 0.4% complication rate            Total Score: 0        PULMONARY  - MARYLU w/ CPAP. Has another sleep study on 3/28/22 @ [facility] to assess CPAP machine - may not be working efficiently.  - Denies pulmonary involvement w/ MS.    - Denies asthma or inhaler use  - Tobacco History      History   Smoking Status     Former Smoker     Packs/day: 0.25     Types: Cigarettes     Quit date: 1/1/2022  "  Smokeless Tobacco     Former User     Comment: Smoked on & off since age 17, < 1/2 ppd       GI  - GERD, mildly symptomatic on Pepcid. Takes TUMS if symptoms are severe.  - Takes Miralax & MagCitrate for constipation    PONV Medium Risk  Total Score: 2           1 AN PONV: Pt is Female    1 AN PONV: Patient is not a current smoker        /RENAL  - Urinary incontinence, wears protective underwear.  - Creatinine 1.04, GFR 59 on 2/24/22      ENDOCRINE    - BMI: Estimated body mass index is 19.34 kg/m  as calculated from the following:    Height as of 2/24/22: 1.746 m (5' 8.74\").    Weight as of 2/24/22: 59 kg (130 lb).    - No history of Diabetes Mellitus   - Grave's disease, postablative hypothyroidism    HEME  VTE Low Risk 0.26%            Total Score: 1    VTE: Greater than 59 yrs old      - No history of abnormal bleeding or antiplatelet use.      MSK  Patient is NOT Frail            Total Score: 0       - Taking baclofen for spasticity in legs, L>R.  - Osteoporosis      The patient is optimized for their procedure. AVS with information on surgery time/arrival time, meds and NPO status given by nursing staff. No further diagnostic testing indicated.    Please refer to the physical examination documented by the anesthesiologist in the anesthesia record on the day of surgery.    Video-Visit Details    Type of service:  Video Visit    Patient verbally consented to video service today: YES    Video Start Time: 1439  Video End Time (time video stopped): 1459    Originating Location (pt. Location): Home    Distant Location (provider location):  [facility]OPER[facility]     Mode of Communication:  Video Conference via [employer]  On the day of service:     Prep time: 15 minutes  Visit time: 20 minutes  Documentation time: 12 minutes  ------------------------------------------  Total time: 47 minutes      Judith Ruiz PA-C  Preoperative Assessment Center  [facility]  Clinic and Surgery " [place]  Phone: [phone]  Fax: [phone]

## 2022-03-23 NOTE — PROGRESS NOTES
Marylee is a 66 year old who is being evaluated via a billable video visit.      How would you like to obtain your AVS? MyChart    HPI     Review of Systems         Objective    Vitals - Patient Reported  Pain Score: No Pain (0)        Physical Exam   KAMI Schmitt LPN

## 2022-03-23 NOTE — H&P (VIEW-ONLY)
Pre-Operative H & P     CC:  Preoperative exam to assess for increased cardiopulmonary risk while undergoing surgery and anesthesia.    Date of Encounter: 3/23/2022  Primary Care Physician:  Carolina Pulliam     Reason for Visit: Atypical ductal hyperplasia of left breast    HPI  Marylee Woods is a 66 year old female who presents for pre-operative H & P in preparation for  Procedure Information     Case: [id number] Date/Time: 03/31/22 1150    Procedure: LEFT Radiofrequency Identification Seed-Localized Lumpectomy (Left Breast)    Anesthesia type: Combined General with Block    Diagnosis: Atypical ductal hyperplasia of left breast [N60.92]    Pre-op diagnosis: Atypical ductal hyperplasia of left breast [N60.92]    Location: [place] [address] / [facility]    Providers: Amanda Liu MD          Patient is being evaluated for comorbid conditions of HTN, hx smoking, Graves disease s/p ablation w/ secondary hypothyroidism, multiple sclerosis, depression, mild CKD, osteoporosis, GERD.    Ms. Marinelli recently presented with pain in the entire LEFT breast, particularly the left upper outer quadrant.  Imaging showed a 5 mm mass at 3:00 4 cm FN in the LEFT breast. A biopsy was performed and a clip was placed.  It showed atypical ductal hyperplasia in a papilloma. She now presents for the above procedure.    History was obtained from patient & chart review.     Hx of abnormal bleeding or anti-platelet use: denies    Menstrual history: No LMP recorded (lmp unknown). Patient is postmenopausal.:       Past Medical History  Past Medical History:   Diagnosis Date     Atypical ductal hyperplasia of left breast 02/2022     Depression      Gastro-oesophageal reflux disease      Graves disease      Multiple sclerosis (H)      Osteoporosis      Postablative hypothyroidism        Past Surgical History  Past Surgical History:   Procedure Laterality Date     C/SECTION, LOW TRANSVERSE  1992      COLONOSCOPY  2007     COLONOSCOPY N/A 1/26/2017    Procedure: COMBINED COLONOSCOPY, SINGLE OR MULTIPLE BIOPSY/POLYPECTOMY BY BIOPSY;  Surgeon: Mayo Adkins MD, MD;  Location:  GI     ESOPHAGOSCOPY, GASTROSCOPY, DUODENOSCOPY (EGD), COMBINED  1/26/2017    Dr. Adkins [facility]     ESOPHAGOSCOPY, GASTROSCOPY, DUODENOSCOPY (EGD), COMBINED N/A 1/26/2017    Procedure: COMBINED ESOPHAGOSCOPY, GASTROSCOPY, DUODENOSCOPY (EGD), BIOPSY SINGLE OR MULTIPLE;  Surgeon: Mayo Adkins MD, MD;  Location:  GI     HIP SURGERY  2009    femur ortho surgery     LAPAROSCOPIC CHOLECYSTECTOMY  6/24/2014    Procedure: LAPAROSCOPIC CHOLECYSTECTOMY;  Surgeon: Randy Bailey MD;  Location: [facility]     OPEN REDUCTION INTERNAL FIXATION FEMUR DISTAL Left 11/1/2018    Procedure: OPEN REDUCTION INTERNAL FIXATION LEFT FEMUR DISTAL;  Surgeon: Jose Valadez MD;  Location: [facility]     OPEN REDUCTION INTERNAL FIXATION RODDING INTRAMEDULLARY TIBIA  4/14/2013    Procedure: OPEN REDUCTION INTERNAL FIXATION RODDING INTRAMEDULLARY TIBIA;;  Surgeon: Sajan Cast MD;  Location: [facility] OR     ORTHOPEDIC SURGERY  2009    surgery right upper femur fx near hip       Prior to Admission Medications  Current Outpatient Medications   Medication Sig Dispense Refill     acetaminophen (TYLENOL) 500 MG tablet Take 1-2 tablets (500-1,000 mg) by mouth every 8 hours as needed for mild pain or fever (greater than 101 degrees)       baclofen (LIORESAL) 10 MG tablet 2 times daily Take 2 tablets (20 MG) by mouth every morning, 1 tablet (10 MG) by mouth daily in the afternoon as needed, and 3 tablets (30 MG) by mouth every evening before bedtime.       calcium carbonate 500 mg, elemental, (OSCAL 500) 1250 (500 Ca) MG TABS tablet Take by mouth every evening       DULoxetine (CYMBALTA) 20 MG capsule Take 1 capsule (20 mg) by mouth daily Take along with 60 mg for a total of 80 mg (Patient taking differently: Take 20 mg by mouth every morning Take along with 60 mg  for a total of 80 mg) 90 capsule 3     DULoxetine (CYMBALTA) 60 MG capsule TAKE 1 CAPSULE BY MOUTH EVERY DAY (Patient taking differently: Take 60 mg by mouth every morning TAKE 1 CAPSULE BY MOUTH EVERY DAY) 90 capsule 3     famotidine (PEPCID) 40 MG tablet TAKE 1 TABLET BY MOUTH TWICE A DAY (Patient taking differently: Take 40 mg by mouth At Bedtime ) 180 tablet 1     gabapentin (NEURONTIN) 300 MG capsule Take 300 mg by mouth 2 times daily Take 2 tablets (600 MG) by mouth 3-4 times daily.       hydrochlorothiazide (HYDRODIURIL) 12.5 MG tablet Take 1 tablet (12.5 mg) by mouth daily (Patient taking differently: Take 12.5 mg by mouth every morning ) 90 tablet 3     latanoprost (XALATAN) 0.005 % ophthalmic solution INSTILL 1 DROP INTO BOTH EYES EVERY DAY AS DIRECTED PT WILL NEED TO BE SEEN FOR FUTURE REFILLS       levothyroxine (SYNTHROID/LEVOTHROID) 75 MCG tablet Take 1 tablet (75 mcg) by mouth daily (Patient taking differently: Take 75 mcg by mouth every morning ) 90 tablet 3     multivitamin w/minerals (MULTI-VITAMIN) tablet Take 1 tablet by mouth every evening       nitroFURantoin macrocrystal-monohydrate (MACROBID) 100 MG capsule Take 1 capsule (100 mg) by mouth daily (Patient taking differently: Take 100 mg by mouth every morning ) 90 capsule 1     oxybutynin (DITROPAN-XL) 10 MG 24 hr tablet Take 1 tablet (10 mg) by mouth 2 times daily 30 tablet      polyethylene glycol (MIRALAX) 17 GM/Dose powder Take 17 g by mouth daily (Patient taking differently: Take 17 g by mouth every morning ) 510 g 0     sennosides (SENOKOT) 8.6 MG tablet Take 1 tablet by mouth daily (Patient taking differently: Take 1 tablet by mouth every morning ) 30 tablet 0     valACYclovir (VALTREX) 500 MG tablet Take 1 tablet (500 mg) by mouth 2 times daily 12 tablet 5       Allergies  Allergies   Allergen Reactions     Amantadine      hallucinations     Budesonide      Symbicort Rash       Social History  Social History     Socioeconomic History      Marital status:      Spouse name: Not on file     Number of children: Not on file     Years of education: Not on file     Highest education level: Not on file   Occupational History     Not on file   Tobacco Use     Smoking status: Former Smoker     Packs/day: 0.25     Types: Cigarettes     Quit date: 2022     Years since quittin.2     Smokeless tobacco: Former User     Tobacco comment: Smoked on & off since age 17, < 1/2 ppd   Substance and Sexual Activity     Alcohol use: Yes     Alcohol/week: 0.0 standard drinks     Comment: 1/wk     Drug use: No     Sexual activity: Yes     Partners: Male   Other Topics Concern     Parent/sibling w/ CABG, MI or angioplasty before 65F 55M? Not Asked   Social History Narrative     Not on file     Social Determinants of Health     Financial Resource Strain: Not on file   Food Insecurity: Not on file   Transportation Needs: Not on file   Physical Activity: Not on file   Stress: Not on file   Social Connections: Not on file   Intimate Partner Violence: Not on file   Housing Stability: Not on file       Family History  Family History   Problem Relation Age of Onset     Heart Disease Maternal Grandmother      Cancer Maternal Grandfather      Heart Disease Paternal Grandfather      Heart Disease Mother      Heart Disease Father      Diabetes No family hx of      Coronary Artery Disease No family hx of      Hypertension No family hx of      Hyperlipidemia No family hx of      Cerebrovascular Disease No family hx of      Breast Cancer No family hx of      Colon Cancer No family hx of      Prostate Cancer No family hx of      Other Cancer No family hx of      Depression No family hx of      Anxiety Disorder No family hx of      Mental Illness No family hx of      Substance Abuse No family hx of      Anesthesia Reaction No family hx of      Asthma No family hx of      Osteoporosis No family hx of      Genetic Disorder No family hx of      Thyroid Disease No family hx of       Obesity No family hx of      Unknown/Adopted No family hx of        Review of Systems  The complete review of systems is negative other than noted in the HPI or here.     Anesthesia Evaluation   Pt has had prior anesthetic.     No history of anesthetic complications       ROS/MED HX  ENT/Pulmonary: Comment: Has another sleep study on 3/28/22 @ [facility] to assess CPAP machine - may not be working efficiently.    Denies pulmonary involvement w/ MS.      (+) sleep apnea, uses CPAP, tobacco use, Past use,  (-) asthma   Neurologic:     (+) Multiple Sclerosis, limitations: last took MS meds in 2019. Has LE weakness.  (-) no seizures and no CVA   Cardiovascular:     (+) hypertension-----Previous cardiac testing   Echo: Date: Results:    Stress Test: Date: Results:    ECG Reviewed: Date: 2018 Results:  Sinus rhythm   Normal ECG   When compared with ECG of 17-MAY-2016 19:34, No significant change was found  Ventricular rate 66 bpm    Cath: Date: Results:      METS/Exercise Tolerance: 1 - Eating, dressing Comment: Able to walk 30 feet (due to MS)   Hematologic:  - neg hematologic  ROS  (-) history of blood clots and history of blood transfusion   Musculoskeletal: Comment: Taking baclofen for spasticity in legs, L>R.    Osteoporosis        GI/Hepatic: Comment: Mildly symptomatic on Pepcid. Takes TUMS is symptoms for severe.    Takes Miralax & MagCitrate for constipation      (+) GERD,  (-) liver disease   Renal/Genitourinary: Comment: Urinary incontinence, wears protective underwear.    Creatinine 1.04, GFR 59 on 2/24/22        Endo:     (+) thyroid problem, hypothyroidism s/p ablation for Graves disease,  (-) Type II DM   Psychiatric/Substance Use:     (+) psychiatric history depression     Infectious Disease:  - neg infectious disease ROS     Malignancy:  - neg malignancy ROS     Other:      (+) , other significant disability Wheelchair bound (Able to stand w/ walker, needs help getting legs onto bed),           Virtual visit -  No vitals were obtained    Physical Exam  Constitutional: Awake, alert, cooperative, no apparent distress, and appears stated age.  HENT: Normocephalic  Respiratory: non labored breathing   Neurologic: Awake, alert, oriented to name, place and time.   Neuropsychiatric: Calm, cooperative. Normal affect.      Prior Labs/Diagnostic Studies   All labs and imaging personally reviewed     EKG  - please see in ROS above     PATHOLOGY 2/11/22  Pathology results:  LEFT breast, 3:00, 4 cm from nipple, ultrasound guided core biopsy:  -Atypical ductal hyperplasia (ADH) involving a sclerosed papillary  lesion  -Calcifications associated with sclerotic stroma  -Negative for invasive carcinoma  -See comment        The patient's records and results personally reviewed by this provider.       Assessment      Marylee Woods is a 66 year old female seen as a PAC referral for risk assessment and optimization for anesthesia.    Plan/Recommendations  Pt will be optimized for the proposed procedure.  See below for details on the assessment, risk, and preoperative recommendations    NEUROLOGY  - No history of TIA, CVA or seizure  - Multiple sclerosis, last took MS meds in 2019. Has LE weakness, primarily wheelchair bound. Able to stand w/ walker, walk ~ 30 feet, needs help getting legs onto bed    ENT  - No current airway concerns.  Will need to be reassessed day of surgery.  Mallampati: Unable to assess  TM: > 3    CARDIAC  - HTN, will hold hydrochlorothiazide on DOS  - METS (Metabolic Equivalents) 1    RCRI-Very low risk: Class 1 0.4% complication rate            Total Score: 0        PULMONARY  - MARYLU w/ CPAP. Has another sleep study on 3/28/22 @ [facility] to assess CPAP machine - may not be working efficiently.  - Denies pulmonary involvement w/ MS.    - Denies asthma or inhaler use  - Tobacco History      History   Smoking Status     Former Smoker     Packs/day: 0.25     Types: Cigarettes     Quit date: 1/1/2022  "  Smokeless Tobacco     Former User     Comment: Smoked on & off since age 17, < 1/2 ppd       GI  - GERD, mildly symptomatic on Pepcid. Takes TUMS if symptoms are severe.  - Takes Miralax & MagCitrate for constipation    PONV Medium Risk  Total Score: 2           1 AN PONV: Pt is Female    1 AN PONV: Patient is not a current smoker        /RENAL  - Urinary incontinence, wears protective underwear.  - Creatinine 1.04, GFR 59 on 2/24/22      ENDOCRINE    - BMI: Estimated body mass index is 19.34 kg/m  as calculated from the following:    Height as of 2/24/22: 1.746 m (5' 8.74\").    Weight as of 2/24/22: 59 kg (130 lb).    - No history of Diabetes Mellitus   - Grave's disease, postablative hypothyroidism    HEME  VTE Low Risk 0.26%            Total Score: 1    VTE: Greater than 59 yrs old      - No history of abnormal bleeding or antiplatelet use.      MSK  Patient is NOT Frail            Total Score: 0       - Taking baclofen for spasticity in legs, L>R.  - Osteoporosis      The patient is optimized for their procedure. AVS with information on surgery time/arrival time, meds and NPO status given by nursing staff. No further diagnostic testing indicated.    Please refer to the physical examination documented by the anesthesiologist in the anesthesia record on the day of surgery.    Video-Visit Details    Type of service:  Video Visit    Patient verbally consented to video service today: YES    Video Start Time: 1439  Video End Time (time video stopped): 1459    Originating Location (pt. Location): Home    Distant Location (provider location):  [facility] PREOPERATIVE ASSESSMENT CENTER     Mode of Communication:  Video Conference via [employer]  On the day of service:     Prep time: 15 minutes  Visit time: 20 minutes  Documentation time: 12 minutes  ------------------------------------------  Total time: 47 minutes      Judith Ruiz PA-C  Preoperative Assessment Center  [facility]  Clinic and Surgery " [place]  Phone: [phone]  Fax: [phone]

## 2022-03-23 NOTE — PATIENT INSTRUCTIONS
Preparing for Your Surgery      Name:  Marylee Woods   MRN:  [id number]   :  1955   Today's Date:  3/23/2022         Arriving for surgery:  Surgery date:  3/31/22  Arrival time:  10:20 am    Restrictions due to COVID 19:    Effective 2022  1 visitor may accompany patient and wait in the surgery waiting room  All visitors must wear a mask and social distance      parking is available for anyone with mobility limitations or disabilities. (Monday- Friday 7 am- 5 pm)    Please come to:    [facility]  [address] [place] [id number]    Please check in on the 5th floor at the Ambulatory Surgery Center       What can I eat or drink?    -  You may eat and drink normally until 8 hours before surgery. (Until 3:50 am)  -  You may have clear liquids up to 4 hours before surgery. (Until 7:50 am)  Examples of clear liquids:  Water  Clear broth  Juices (apple, white grape, white cranberry  and cider) without pulp  Noncarbonated, powder based beverages  (lemonade and Palomo-Aid)  Sodas (Sprite, 7-Up, ginger ale and seltzer)  Coffee or tea (without milk or cream)  Gatorade    --No alcohol for at least 24 hours before surgery    Which medicines can I take?    Hold Aspirin for 7 days before surgery.   * Hold Multivitamins for 7 days before surgery. Take the last Multivitamin on 3-23-22 and then hold until surgery.  Hold Supplements for 7 days before surgery.  Hold Ibuprofen (Advil, Motrin) for 1 day before surgery--unless otherwise directed by surgeon.  Hold Naproxen (Aleve) for 4 days before surgery.    -  DO NOT take the following medications the day of surgery:  Hydrochlorothiazide, Miralax, Senokot,     -  PLEASE TAKE the following medications the day of surgery   Baclofen (Lioresal), Duloxetine (Cymbalta), Gabapentin, Levothyroxine, Nitrofurantoin (Macrobid), Oxybutynin (Ditropan), Valacyclovir (Valtrex),  Acetaminophen (Tylenol) if needed    How do I prepare myself?  -  Bring  CPAP.  - Please take 2 showers before surgery using Scrubcare or Hibiclens soap.    Use this soap only from the neck to your toes.     Leave the soap on your skin for one minute--then rinse thoroughly.      You may use your own shampoo and conditioner; no other hair products.   - Please remove all jewelry and body piercings.  - No lotions, deodorants or fragrance.  - No makeup or fingernail polish.   - Bring your ID and insurance card.    -If you have a Deep Brain Stimulator, a Spinal Cord Stimulator or any implanted Neuro Device you must bring the remote to the Surgery Center         - All patients are required to have a Covid-19 test within 4 days of surgery/procedure.      -Patients will be contacted by the [facility] scheduling team within 1 week of surgery to make an appointment.      - Patients may call the Scheduling team at [phone] if they have not been scheduled within 4 days of  surgery.      ALL PATIENTS ARE REQUIRED TO HAVE A RESPONSIBLE ADULT TO DRIVE AND BE IN ATTENDANCE WITH THEM FOR 24 HOURS FOLLOWING SURGERY       Questions or Concerns:    -For questions regarding the day of surgery please contact the Ambulatory Surgery Center at [phone].    -If you have health changes between today and your surgery please contact your surgeon.     For questions after surgery please call your surgeons office.

## 2022-03-28 ENCOUNTER — LAB (OUTPATIENT)
Dept: LAB | Facility: CLINIC | Age: 67
End: 2022-03-28
Attending: SURGERY

## 2022-03-28 ENCOUNTER — ANCILLARY PROCEDURE (OUTPATIENT)
Dept: MAMMOGRAPHY | Facility: CLINIC | Age: 67
End: 2022-03-28
Attending: SURGERY
Payer: MEDICARE

## 2022-03-28 DIAGNOSIS — Z11.59 ENCOUNTER FOR SCREENING FOR OTHER VIRAL DISEASES: ICD-10-CM

## 2022-03-28 DIAGNOSIS — N60.92 ATYPICAL DUCTAL HYPERPLASIA OF LEFT BREAST: ICD-10-CM

## 2022-03-28 LAB — SARS-COV-2 RNA RESP QL NAA+PROBE: NEGATIVE

## 2022-03-28 PROCEDURE — U0005 INFEC AGEN DETEC AMPLI PROBE: HCPCS | Performed by: SURGERY

## 2022-03-28 PROCEDURE — 19285 PERQ DEV BREAST 1ST US IMAG: CPT | Mod: LT | Performed by: RADIOLOGY

## 2022-03-30 RX ORDER — NALOXONE HYDROCHLORIDE 0.4 MG/ML
0.4 INJECTION, SOLUTION INTRAMUSCULAR; INTRAVENOUS; SUBCUTANEOUS
Status: DISCONTINUED | OUTPATIENT
Start: 2022-03-30 | End: 2022-04-01 | Stop reason: HOSPADM

## 2022-03-30 RX ORDER — NALOXONE HYDROCHLORIDE 0.4 MG/ML
0.2 INJECTION, SOLUTION INTRAMUSCULAR; INTRAVENOUS; SUBCUTANEOUS
Status: DISCONTINUED | OUTPATIENT
Start: 2022-03-30 | End: 2022-04-01 | Stop reason: HOSPADM

## 2022-03-31 ENCOUNTER — HOSPITAL ENCOUNTER (OUTPATIENT)
Facility: AMBULATORY SURGERY CENTER | Age: 67
Discharge: HOME | End: 2022-03-31
Attending: SURGERY
Payer: MEDICARE

## 2022-03-31 ENCOUNTER — ANESTHESIA (OUTPATIENT)
Dept: SURGERY | Facility: AMBULATORY SURGERY CENTER | Age: 67
End: 2022-03-31
Payer: MEDICARE

## 2022-03-31 ENCOUNTER — ANCILLARY PROCEDURE (OUTPATIENT)
Dept: MAMMOGRAPHY | Facility: CLINIC | Age: 67
End: 2022-03-31
Attending: SURGERY
Payer: MEDICARE

## 2022-03-31 VITALS
SYSTOLIC BLOOD PRESSURE: 161 MMHG | DIASTOLIC BLOOD PRESSURE: 89 MMHG | HEIGHT: 69 IN | BODY MASS INDEX: 19.26 KG/M2 | RESPIRATION RATE: 12 BRPM | WEIGHT: 130 LBS | OXYGEN SATURATION: 99 % | HEART RATE: 66 BPM | TEMPERATURE: 96.8 F

## 2022-03-31 DIAGNOSIS — N60.92 ATYPICAL DUCTAL HYPERPLASIA OF LEFT BREAST: ICD-10-CM

## 2022-03-31 PROCEDURE — 999N000127 HC STATISTIC PERIPHERAL IV START W US GUIDANCE

## 2022-03-31 PROCEDURE — 19125 EXCISION BREAST LESION: CPT | Mod: LT

## 2022-03-31 PROCEDURE — 88342 IMHCHEM/IMCYTCHM 1ST ANTB: CPT | Mod: TC | Performed by: SURGERY

## 2022-03-31 PROCEDURE — 88307 TISSUE EXAM BY PATHOLOGIST: CPT | Mod: TC | Performed by: SURGERY

## 2022-03-31 PROCEDURE — 19125 EXCISION BREAST LESION: CPT | Mod: LT | Performed by: SURGERY

## 2022-03-31 PROCEDURE — 76098 X-RAY EXAM SURGICAL SPECIMEN: CPT | Performed by: RADIOLOGY

## 2022-03-31 RX ORDER — PROPOFOL 10 MG/ML
INJECTION, EMULSION INTRAVENOUS CONTINUOUS PRN
Status: DISCONTINUED | OUTPATIENT
Start: 2022-03-31 | End: 2022-03-31

## 2022-03-31 RX ORDER — SODIUM CHLORIDE, SODIUM LACTATE, POTASSIUM CHLORIDE, CALCIUM CHLORIDE 600; 310; 30; 20 MG/100ML; MG/100ML; MG/100ML; MG/100ML
INJECTION, SOLUTION INTRAVENOUS CONTINUOUS
Status: DISCONTINUED | OUTPATIENT
Start: 2022-03-31 | End: 2022-03-31 | Stop reason: HOSPADM

## 2022-03-31 RX ORDER — FENTANYL CITRATE 50 UG/ML
25 INJECTION, SOLUTION INTRAMUSCULAR; INTRAVENOUS EVERY 5 MIN PRN
Status: DISCONTINUED | OUTPATIENT
Start: 2022-03-31 | End: 2022-03-31 | Stop reason: HOSPADM

## 2022-03-31 RX ORDER — FLUMAZENIL 0.1 MG/ML
0.2 INJECTION, SOLUTION INTRAVENOUS
Status: DISCONTINUED | OUTPATIENT
Start: 2022-03-31 | End: 2022-03-31 | Stop reason: HOSPADM

## 2022-03-31 RX ORDER — KETAMINE HYDROCHLORIDE 10 MG/ML
INJECTION INTRAMUSCULAR; INTRAVENOUS PRN
Status: DISCONTINUED | OUTPATIENT
Start: 2022-03-31 | End: 2022-03-31

## 2022-03-31 RX ORDER — BUPIVACAINE HYDROCHLORIDE 2.5 MG/ML
INJECTION, SOLUTION EPIDURAL; INFILTRATION; INTRACAUDAL
Status: DISCONTINUED | OUTPATIENT
Start: 2022-03-31 | End: 2022-03-31

## 2022-03-31 RX ORDER — ACETAMINOPHEN 325 MG/1
975 TABLET ORAL ONCE
Status: COMPLETED | OUTPATIENT
Start: 2022-03-31 | End: 2022-03-31

## 2022-03-31 RX ORDER — SODIUM CHLORIDE, SODIUM LACTATE, POTASSIUM CHLORIDE, CALCIUM CHLORIDE 600; 310; 30; 20 MG/100ML; MG/100ML; MG/100ML; MG/100ML
INJECTION, SOLUTION INTRAVENOUS CONTINUOUS
Status: DISCONTINUED | OUTPATIENT
Start: 2022-03-31 | End: 2022-04-01 | Stop reason: HOSPADM

## 2022-03-31 RX ORDER — CEFAZOLIN SODIUM 2 G/50ML
2 SOLUTION INTRAVENOUS
Status: DISCONTINUED | OUTPATIENT
Start: 2022-03-31 | End: 2022-03-31 | Stop reason: HOSPADM

## 2022-03-31 RX ORDER — OXYCODONE HYDROCHLORIDE 5 MG/1
5 TABLET ORAL EVERY 4 HOURS PRN
Status: DISCONTINUED | OUTPATIENT
Start: 2022-03-31 | End: 2022-04-01 | Stop reason: HOSPADM

## 2022-03-31 RX ORDER — ONDANSETRON 4 MG/1
4 TABLET, ORALLY DISINTEGRATING ORAL EVERY 30 MIN PRN
Status: DISCONTINUED | OUTPATIENT
Start: 2022-03-31 | End: 2022-04-01 | Stop reason: HOSPADM

## 2022-03-31 RX ORDER — CEFAZOLIN SODIUM 2 G/50ML
2 SOLUTION INTRAVENOUS SEE ADMIN INSTRUCTIONS
Status: DISCONTINUED | OUTPATIENT
Start: 2022-03-31 | End: 2022-03-31 | Stop reason: HOSPADM

## 2022-03-31 RX ORDER — FENTANYL CITRATE 50 UG/ML
25-50 INJECTION, SOLUTION INTRAMUSCULAR; INTRAVENOUS
Status: DISCONTINUED | OUTPATIENT
Start: 2022-03-31 | End: 2022-03-31 | Stop reason: HOSPADM

## 2022-03-31 RX ORDER — MEPERIDINE HYDROCHLORIDE 25 MG/ML
12.5 INJECTION INTRAMUSCULAR; INTRAVENOUS; SUBCUTANEOUS
Status: DISCONTINUED | OUTPATIENT
Start: 2022-03-31 | End: 2022-04-01 | Stop reason: HOSPADM

## 2022-03-31 RX ORDER — FENTANYL CITRATE 50 UG/ML
25 INJECTION, SOLUTION INTRAMUSCULAR; INTRAVENOUS
Status: DISCONTINUED | OUTPATIENT
Start: 2022-03-31 | End: 2022-04-01 | Stop reason: HOSPADM

## 2022-03-31 RX ORDER — HYDROMORPHONE HYDROCHLORIDE 1 MG/ML
0.2 INJECTION, SOLUTION INTRAMUSCULAR; INTRAVENOUS; SUBCUTANEOUS EVERY 5 MIN PRN
Status: DISCONTINUED | OUTPATIENT
Start: 2022-03-31 | End: 2022-03-31 | Stop reason: HOSPADM

## 2022-03-31 RX ORDER — ONDANSETRON 2 MG/ML
INJECTION INTRAMUSCULAR; INTRAVENOUS PRN
Status: DISCONTINUED | OUTPATIENT
Start: 2022-03-31 | End: 2022-03-31

## 2022-03-31 RX ORDER — ONDANSETRON 2 MG/ML
4 INJECTION INTRAMUSCULAR; INTRAVENOUS EVERY 30 MIN PRN
Status: DISCONTINUED | OUTPATIENT
Start: 2022-03-31 | End: 2022-04-01 | Stop reason: HOSPADM

## 2022-03-31 RX ORDER — ACETAMINOPHEN 325 MG/1
975 TABLET ORAL ONCE
Status: DISCONTINUED | OUTPATIENT
Start: 2022-03-31 | End: 2022-03-31 | Stop reason: HOSPADM

## 2022-03-31 RX ADMIN — PROPOFOL 100 MCG/KG/MIN: 10 INJECTION, EMULSION INTRAVENOUS at 12:27

## 2022-03-31 RX ADMIN — SODIUM CHLORIDE, SODIUM LACTATE, POTASSIUM CHLORIDE, CALCIUM CHLORIDE: 600; 310; 30; 20 INJECTION, SOLUTION INTRAVENOUS at 12:10

## 2022-03-31 RX ADMIN — OXYCODONE HYDROCHLORIDE 5 MG: 5 TABLET ORAL at 14:35

## 2022-03-31 RX ADMIN — CEFAZOLIN SODIUM 2 G: 2 SOLUTION INTRAVENOUS at 12:20

## 2022-03-31 RX ADMIN — BUPIVACAINE HYDROCHLORIDE 20 ML: 2.5 INJECTION, SOLUTION EPIDURAL; INFILTRATION; INTRACAUDAL at 12:15

## 2022-03-31 RX ADMIN — KETAMINE HYDROCHLORIDE 15 MG: 10 INJECTION INTRAMUSCULAR; INTRAVENOUS at 12:36

## 2022-03-31 RX ADMIN — ONDANSETRON 4 MG: 2 INJECTION INTRAMUSCULAR; INTRAVENOUS at 12:50

## 2022-03-31 RX ADMIN — ACETAMINOPHEN 975 MG: 325 TABLET ORAL at 11:18

## 2022-03-31 NOTE — ANESTHESIA CARE TRANSFER NOTE
Patient: Marylee Woods    Procedure: Procedure(s):  LEFT Radiofrequency Identification Seed-Localized Lumpectomy       Diagnosis: Atypical ductal hyperplasia of left breast [N60.92]  Diagnosis Additional Information: No value filed.    Anesthesia Type:   MAC     Note:    Oropharynx: oropharynx clear of all foreign objects and spontaneously breathing  Level of Consciousness: awake  Oxygen Supplementation: room air    Independent Airway: airway patency satisfactory and stable  Dentition: dentition unchanged  Vital Signs Stable: post-procedure vital signs reviewed and stable  Report to RN Given: handoff report given  Patient transferred to: Phase II  Comments: Uneventful transport   Report to RN  Exchanging well; color natl  Pt responds appropriately to command  IV patent  Lips/teeth/dentition as preop status  Questions answered    Handoff Report: Identifed the Patient, Identified the Reponsible Provider, Reviewed the pertinent medical history, Discussed the surgical course, Reviewed Intra-OP anesthesia mangement and issues during anesthesia, Set expectations for post-procedure period and Allowed opportunity for questions and acknowledgement of understanding      Vitals:  Vitals Value Taken Time   /83 03/31/22 1339   Temp 35.8  C (96.4  F) 03/31/22 1339   Pulse 65    Resp 12 03/31/22 1339   SpO2 98 % 03/31/22 1339       Electronically Signed By: SWAPNIL TRAN CRNA  March 31, 2022  1:42 PM

## 2022-03-31 NOTE — OP NOTE
DATE OF SURGERY: March 31, 2022     SURGEON: Amanda Liu MD  ASSISTANT(S): Jackson Renteria MD PGY-3    PREOPERATIVE DIAGNOSIS: Atypical ductal hyperplasia, 3:00 position of the left breast  POSTOPERATIVE DIAGNOSIS: same    PROCEDURE(S): Left RFID seed-localized lumpectomy    ANESTHESIA: MAC + Block    CLINICAL NOTE:  Marylee Woods is a 66 year old woman with atypical ductal hyperplasia of the left breast.  The risks and benefits of RFID seed-localized lumpectomy were discussed with the patient and she elected to proceed with informed consent.    OPERATIVE FINDINGS:  1. Specimen radiograph confirmed presence of the RFID seed and biopsy clip within the specimen.     OPERATIVE PROCEDURE:  The localization images were reviewed by me prior to the procedure today. The patient was brought to the operating room and placed in the supine position with appropriate padding for all of the pressure points. MAC was provided by the Anesthesia team.   A surgical safety checklist was performed to confirm the patient's identity, the site and laterality of the procedure. Perioperative antibiotics (Ancef) was provided.  VTE prophylaxis was provided with serial compression devices. The left breast was then prepped and draped in the usual sterile fashion using Chloraprep.     A periareolar incision was made in the lateral left breast.  Superficial skin flaps were raised.  The breast tissue denoted by the localizing RFID seed (ID [id number]) was dissected out. The specimen was inked and radiographed.  It was found to contain the RFID seed and biopsy clip.  The specimen was then sent to pathology. The wound was irrigated and hemostasis was achieved.  Deep 3-0 vicryl sutures were placed to reapproximate the breast tissue to avoid a divot.   The incision was closed in two layers with interrupted 3-0 vicryl and a running subcuticular 4-0 monocryl.  Steristrips and dressings were applied.  The chest was wrapped in an ACE bandage.   The patient  tolerated the procedure well. The sponge, needle, instrument counts were correct.  The patient was then awakened and taken to recovery in stable fashion.     I was present and scrubbed for the entire above procedure.    EBL: 5 mL    SPECIMEN(S):  A. Left breast mass    POSTOPERATIVE PLANS:  Marylee Woods will be discharged home today with wound care instructions and no prescription for analgesics.  She will follow-up with me in 2 weeks.     Amadna Liu MD MSc FRC FACS    Division of Surgical Oncology  [facility]

## 2022-03-31 NOTE — ANESTHESIA POSTPROCEDURE EVALUATION
Patient: Marylee Woods    Procedure: Procedure(s):  LEFT Radiofrequency Identification Seed-Localized Lumpectomy       Anesthesia Type:  MAC    Note:  Disposition: Outpatient   Postop Pain Control: Uneventful            Sign Out: Well controlled pain   PONV: No   Neuro/Psych: Uneventful            Sign Out: Acceptable/Baseline neuro status   Airway/Respiratory: Uneventful            Sign Out: Acceptable/Baseline resp. status   CV/Hemodynamics: Uneventful            Sign Out: Acceptable CV status   Other NRE: NONE   DID A NON-ROUTINE EVENT OCCUR? No           Last vitals:  Vitals Value Taken Time   /99 03/31/22 1415   Temp 35.8  C (96.4  F) 03/31/22 1339   Pulse     Resp 12 03/31/22 1415   SpO2 99 % 03/31/22 1415       Electronically Signed By: Parminder Philip MD  March 31, 2022  2:41 PM

## 2022-03-31 NOTE — DISCHARGE INSTRUCTIONS
"[facility]  Home Care Following Anesthesia  For 24 hours after surgery:  1. Get plenty of rest.  A responsible adult must stay with you for at least 24 hours after you leave the surgery center.  2. Do not drive or use heavy equipment.  If you have weakness or tingling, don't drive or use heavy equipment until this feeling goes away.   3. Do not drink alcohol.   4. Avoid strenuous or risky activities.  Ask for help when climbing stairs.  5. You may feel lightheaded.  IF so, sit for a few minutes before standing.  Have someone help you get up.   6. If you have nausea (feel sick to your stomach): Drink only clear liquids such as apple juice, ginger ale, broth or 7-Up.  Rest may also help.  Be sure to drink enough fluids.  Move to a regular diet as you feel able.   7. You may have a slight fever.  Call the doctor if your fever is over 100 F (37.7 C) (taken under the tongue) or lasts longer than 24 hours.  8. You may have a dry mouth, a sore throat, muscle aches or trouble sleeping. These should go away after 24 hours.  9. Do not make important or legal decisions.   10. It is recommended to avoid smoking.        Today you received an Exparel block to numb the nerves near your surgery site.  This is a block using local anesthetic or \"numbing\" medication injected around the nerves to anesthetize or \"numb\" the area supplied by those nerves.  This block is injected into the muscle layer near your surgical site.  This medication may numb the location where you had surgery up to 72 hours.  If your surgical site is an arm or leg you should be careful with your affected limb, since it is possible to injure your limb without being aware of it due to the numbing.  Until full feeling returns, you should guard against bumping or hitting your limb, and avoid extreme hot or cold temperatures on the skin.  As the block wears off, the feeling will return as a tingling or prickly sensation near your " surgical site.  You will experince more discomfort from your incision as the feeling returns.  You may want to take a pain pill (a narcotic or Tylenol if this was prescribed by your surgeon) when you start to experience mild pain before the pain beomes more severe.  If your pain medications do not control your pain, you should notify your surgeon.    Tips for taking pain medications  To get the best pain relief possible, remember these points:    Take pain medications as directed, before pain becomes severe.    Pain medication can upset your stomach: taking it with food may help.    Constipation is a common side effect of pain medication. Drink plenty of  fluids.    Eat foods high in fiber. Take a stool softener if recommended by your doctor or pharmacist.    Do not drink alcohol, drive or operate machinery while taking pain medications.    Ask about other ways to control pain, such as with heat, ice or relaxation.    Tylenol/Acetaminophen Consumption  To help encourage the safe use of acetaminophen, the makers of TYLENOL  have lowered the maximum daily dose for single-ingredient Extra Strength TYLENOL  (acetaminophen) products sold in the [place] from 8 pills per day (4,000 mg) to 6 pills per day (3,000 mg). The dosing interval has also changed from 2 pills every 4-6 hours to 2 pills every 6 hours.    If you feel your pain relief is insufficient, you may take Tylenol/Acetaminophen in addition to your narcotic pain medication.     Be careful not to exceed 3,000 mg of Tylenol/Acetaminophen in a 24 hour period from all sources.    If you are taking extra strength Tylenol/acetaminophen (500 mg), the maximum dose is 6 tablets in 24 hours.    If you are taking regular strength acetaminophen (325 mg), the maximum dose is 9 tablets in 24 hours.    You took 975mg tylenol at 11:15am.  You can take additional tylenol after 5:15pm.    Call a doctor for any of the followin. Signs of infection (fever, growing tenderness at the  surgery site, a large amount of drainage or bleeding, severe pain, foul-smelling drainage, redness, swelling).  2. It has been over 8 to 10 hours since surgery and you are still not able to urinate (pass water).  3. Headache for over 24 hours.  4. Signs of Covid-19 infection (temperature over 100 degrees, shortness of breath, cough, loss of taste/smell, generalized body aches, persistent headache, chills, sore throat, nausea/vomiting/diarrhea)  Your doctor is:  Dr. Amanda Liu, Breast Center: [phone]   (Monday-Friday, 8-4)                 Or dial [phone] and ask for the resident on call for:  Breast Center     (After Hours)  For emergency care, call the:  [place] [facility]:  [phone] (TTY for hearing impaired: [phone])

## 2022-03-31 NOTE — ANESTHESIA PREPROCEDURE EVALUATION
Anesthesia Pre-Procedure Evaluation    Patient: Marylee Woods   MRN: [id number] : 1955        Procedure : Procedure(s):  LEFT Radiofrequency Identification Seed-Localized Lumpectomy          Past Medical History:   Diagnosis Date     Atypical ductal hyperplasia of left breast 2022     Depression      Gastro-oesophageal reflux disease      Graves disease      Multiple sclerosis (H)      Osteoporosis      Postablative hypothyroidism       Past Surgical History:   Procedure Laterality Date     C/SECTION, LOW TRANSVERSE       COLONOSCOPY       COLONOSCOPY N/A 2017    Procedure: COMBINED COLONOSCOPY, SINGLE OR MULTIPLE BIOPSY/POLYPECTOMY BY BIOPSY;  Surgeon: Mayo Adkins MD, MD;  Location:  GI     ESOPHAGOSCOPY, GASTROSCOPY, DUODENOSCOPY (EGD), COMBINED  2017    Dr. Adkins [facility]     ESOPHAGOSCOPY, GASTROSCOPY, DUODENOSCOPY (EGD), COMBINED N/A 2017    Procedure: COMBINED ESOPHAGOSCOPY, GASTROSCOPY, DUODENOSCOPY (EGD), BIOPSY SINGLE OR MULTIPLE;  Surgeon: Mayo Adkins MD, MD;  Location:  GI     HIP SURGERY      femur ortho surgery     LAPAROSCOPIC CHOLECYSTECTOMY  2014    Procedure: LAPAROSCOPIC CHOLECYSTECTOMY;  Surgeon: Randy Bailey MD;  Location: [facility]     OPEN REDUCTION INTERNAL FIXATION FEMUR DISTAL Left 2018    Procedure: OPEN REDUCTION INTERNAL FIXATION LEFT FEMUR DISTAL;  Surgeon: Jose Valadez MD;  Location: [facility]     OPEN REDUCTION INTERNAL FIXATION RODDING INTRAMEDULLARY TIBIA  2013    Procedure: OPEN REDUCTION INTERNAL FIXATION RODDING INTRAMEDULLARY TIBIA;;  Surgeon: Sajan Cast MD;  Location: [facility] OR     ORTHOPEDIC SURGERY  2009    surgery right upper femur fx near hip      Allergies   Allergen Reactions     Amantadine      hallucinations     Budesonide      Symbicort Rash      Social History     Tobacco Use     Smoking status: Former Smoker     Packs/day: 0.25     Types: Cigarettes     Quit date: 2022     Years since  quittin.2     Smokeless tobacco: Former User     Tobacco comment: Smoked on & off since age 17, < 1/2 ppd   Substance Use Topics     Alcohol use: Yes     Alcohol/week: 0.0 standard drinks     Comment: occasional       Wt Readings from Last 1 Encounters:   22 59 kg (130 lb)        Anesthesia Evaluation            ROS/MED HX  ENT/Pulmonary:       Neurologic:     (+) Multiple Sclerosis,     Cardiovascular:       METS/Exercise Tolerance:     Hematologic:       Musculoskeletal:       GI/Hepatic:     (+) GERD,     Renal/Genitourinary:     (+) renal disease, type: CRI,     Endo:     (+) thyroid problem, hypothyroidism,     Psychiatric/Substance Use:       Infectious Disease:       Malignancy:       Other:            Physical Exam    Airway  airway exam normal      Mallampati: II   TM distance: > 3 FB   Neck ROM: full   Mouth opening: > 3 cm    Respiratory Devices and Support         Dental  no notable dental history         Cardiovascular          Rhythm and rate: regular and normal     Pulmonary   pulmonary exam normal        breath sounds clear to auscultation           OUTSIDE LABS:  CBC:   Lab Results   Component Value Date    WBC 9.8 2021    WBC 10.4 2021    HGB 12.6 2021    HGB 12.2 2021    HCT 36.6 2021    HCT 34.1 (L) 2021     2021     (L) 2021     BMP:   Lab Results   Component Value Date     2021     2021    POTASSIUM 3.8 2021    POTASSIUM 4.5 2021    CHLORIDE 102 2021    CHLORIDE 102 2021    CO2 34 (H) 2021    CO2 34 (H) 2021    BUN 15 2021    BUN 9 2021    CR 1.04 2022    CR 1.08 (H) 2021    GLC 80 2021    GLC 90 2021     COAGS:   Lab Results   Component Value Date    PTT 36 2011    INR 1.03 10/31/2018    FIBR 364 2011     POC:   Lab Results   Component Value Date    BGM 95 2021     HEPATIC:   Lab Results   Component Value Date     ALBUMIN 3.6 08/12/2021    PROTTOTAL 7.2 07/02/2021    ALT 19 07/02/2021    AST 30 07/02/2021    ALKPHOS 64 07/02/2021    BILITOTAL 0.7 07/02/2021    ALINA 12 11/11/2011     OTHER:   Lab Results   Component Value Date    PH 7.50 (H) 11/17/2011    LACT 1.2 11/01/2017    A1C 5.6 11/29/2010    JORDON 9.2 02/24/2022    PHOS 3.4 08/12/2021    MAG 1.8 07/01/2021    LIPASE 60 (L) 06/30/2021    AMYLASE 31 05/04/2016    TSH 1.33 11/05/2021    T4 1.15 11/05/2021    T3 327 (H) 12/29/2020    CRP <2.9 06/30/2021    SED 17 07/01/2021       Anesthesia Plan    ASA Status:  3   NPO Status:  NPO Appropriate    Anesthesia Type: MAC.     - Reason for MAC: immobility needed, straight local not clinically adequate   Induction: Intravenous.   Maintenance: TIVA.        Consents    Anesthesia Plan(s) and associated risks, benefits, and realistic alternatives discussed. Questions answered and patient/representative(s) expressed understanding.    - Discussed:     - Discussed with:  Patient      - Extended Intubation/Ventilatory Support Discussed: No.      - Patient is DNR/DNI Status: No    Use of blood products discussed: No .     Postoperative Care    Pain management: IV analgesics, Oral pain medications, Multi-modal analgesia.   PONV prophylaxis: Ondansetron (or other 5HT-3), Background Propofol Infusion     Comments:                Parminder Philip MD

## 2022-03-31 NOTE — ANESTHESIA PROCEDURE NOTES
Pectoralis Procedure Note    Pre-Procedure   Staff -        Anesthesiologist:  Parminder Philip MD       Performed By: anesthesiologist       Location: pre-op       Procedure Start/Stop Times: 3/31/2022 12:10 PM and 3/31/2022 12:16 PM       Pre-Anesthestic Checklist: patient identified, IV checked, site marked, risks and benefits discussed, informed consent, monitors and equipment checked, pre-op evaluation, at physician/surgeon's request and post-op pain management  Timeout:       Correct Patient: Yes        Correct Procedure: Yes        Correct Site: Yes        Correct Position: Yes        Correct Laterality: Yes        Site Marked: Yes  Procedure Documentation  Procedure: Pectoralis       Laterality: left       Patient Position: sitting       Patient Prep/Sterile Barriers: sterile gloves, mask       Skin prep: Chloraprep       Needle Type: short bevel       Needle Gauge: 21.        Needle Length (millimeters): 100        Ultrasound guided       1. Ultrasound was used to identify targeted nerve, plexus, vascular marker, or fascial plane and place a needle adjacent to it in real-time.       2. Ultrasound was used to visualize the spread of anesthetic in close proximity to the above referenced structure.       3. A permanent image is entered into the patient's record.       4. The visualized anatomic structures appeared normal.       5. There were no apparent abnormal pathologic findings.    Assessment/Narrative         The placement was negative for: blood aspirated, painful injection and site bleeding       Paresthesias: No.     Bolus given via needle..        Secured via.        Insertion/Infusion Method: Single Shot       Complications: none       Injection made incrementally with aspirations every 5 mL.    Medication(s) Administered   Bupivacaine 0.25% PF (Infiltration), 20 mL  Bupivacaine liposome (Exparel) 1.3% LA inj susp (Infiltration), 20 mL  Medication Administration Time: 3/31/2022 12:15  PM

## 2022-03-31 NOTE — OR NURSING
Patient received left side Pectoralis nerve block  with Exparel.   Versed 1mg given. Tolerated procedure well.

## 2022-04-07 LAB
PATH REPORT.COMMENTS IMP SPEC: ABNORMAL
PATH REPORT.COMMENTS IMP SPEC: ABNORMAL
PATH REPORT.COMMENTS IMP SPEC: YES
PATH REPORT.FINAL DX SPEC: ABNORMAL
PATH REPORT.GROSS SPEC: ABNORMAL
PATH REPORT.MICROSCOPIC SPEC OTHER STN: ABNORMAL
PATH REPORT.RELEVANT HX SPEC: ABNORMAL
PATHOLOGY SYNOPTIC REPORT: ABNORMAL
PHOTO IMAGE: ABNORMAL

## 2022-04-07 PROCEDURE — 88307 TISSUE EXAM BY PATHOLOGIST: CPT | Mod: 26 | Performed by: PATHOLOGY

## 2022-04-07 PROCEDURE — 88360 TUMOR IMMUNOHISTOCHEM/MANUAL: CPT | Mod: 26 | Performed by: PATHOLOGY

## 2022-04-07 PROCEDURE — 88341 IMHCHEM/IMCYTCHM EA ADD ANTB: CPT | Mod: 26 | Performed by: PATHOLOGY

## 2022-04-07 PROCEDURE — 88342 IMHCHEM/IMCYTCHM 1ST ANTB: CPT | Mod: 26 | Performed by: PATHOLOGY

## 2022-04-14 ENCOUNTER — ONCOLOGY VISIT (OUTPATIENT)
Dept: ONCOLOGY | Facility: CLINIC | Age: 67
End: 2022-04-14
Attending: SURGERY
Payer: MEDICARE

## 2022-04-14 VITALS
SYSTOLIC BLOOD PRESSURE: 131 MMHG | DIASTOLIC BLOOD PRESSURE: 79 MMHG | OXYGEN SATURATION: 100 % | RESPIRATION RATE: 16 BRPM | HEART RATE: 80 BPM | TEMPERATURE: 97.9 F

## 2022-04-14 DIAGNOSIS — D05.12 DUCTAL CARCINOMA IN SITU (DCIS) OF LEFT BREAST: Primary | ICD-10-CM

## 2022-04-14 PROCEDURE — G0463 HOSPITAL OUTPT CLINIC VISIT: HCPCS

## 2022-04-14 PROCEDURE — 99024 POSTOP FOLLOW-UP VISIT: CPT | Performed by: SURGERY

## 2022-04-14 ASSESSMENT — PAIN SCALES - GENERAL: PAINLEVEL: MILD PAIN (3)

## 2022-04-14 NOTE — NURSING NOTE
"Oncology Rooming Note    April 14, 2022 7:13 AM   Marylee Woods is a 66 year old female who presents for:    Chief Complaint   Patient presents with     Oncology Clinic Visit     Return; Hyperplasia of L breast     Initial Vitals: /79   Pulse 80   Temp 97.9  F (36.6  C) (Oral)   Resp 16   LMP  (LMP Unknown)   SpO2 100%  Estimated body mass index is 19.34 kg/m  as calculated from the following:    Height as of 3/31/22: 1.746 m (5' 8.74\").    Weight as of 3/31/22: 59 kg (130 lb). There is no height or weight on file to calculate BSA.  Mild Pain (3) Comment: Data Unavailable   No LMP recorded (lmp unknown). Patient is postmenopausal.  Allergies reviewed: Yes  Medications reviewed: Yes    Medications: Medication refills not needed today.  Pharmacy name entered into [employer]:    [facility]/PH[facility]ACY #[id number] [place], [place] - [address] [facility] PHARMACY [place], [place] - [address] [place] [address] PHARM[address] [place] [address] - [address] [place] [address] PHARMACY [place], [place] - [address] [facility] [phone]    Clinical concerns: Patient states there are no new concerns to discuss with provider.  Dr Liu was not notified.        Luz Benton CMA              "

## 2022-04-14 NOTE — PROGRESS NOTES
FOLLOW-UP  Apr 14, 2022    Marylee Woods is a 66 year old female who returns for her 1st post-operative follow-up visit.    Treatment to date:  1. Left RFID seed localized lumpectomy for ADH (3/31/2022)     HPI:    She underwent a left RFID seed localized lumpectomy for ADH on 3/31/2022.  She is currently 2 week(s) post-op.  Final surgical pathology showed 7 mm of ER+ grade 2 ductal carcinoma in situ with uninvolved margins.    Since the procedure, she has been doing well. She denies any redness or swelling, or drainage from the incision, or fever/chills.  She has good range of motion and denies any upper extremity swelling.  She is having some ongoing discomfort in the breast that is managed with tylenol.    /79   Pulse 80   Temp 97.9  F (36.6  C) (Oral)   Resp 16   LMP  (LMP Unknown)   SpO2 100%    Physical Exam  Constitutional:       Appearance: She is well-developed.   Pulmonary:      Effort: No respiratory distress.   Chest:      Comments: Breast incision healing well.  No seroma. No cellulitis or abscess. No breast contour abnormality or nipple-areolar complex distortion.    Skin:     General: Skin is warm and dry.       INVESTIGATIONS:    Surgical Pathology (3/31/2022):  Final Diagnosis   LEFT breast, 3:00, RFID seed localized lumpectomy:  -Ductal carcinoma in situ (DCIS), nuclear grade 2, size 7 mm, arising in a sclerosed intraductal papilloma  -No invasive carcinoma identified  -Margins are uninvolved by DCIS  -DCIS is 4 mm from the closest (posterior) margin, 5 mm from the lateral margin, and is > 5 mm from the anterior, superior, inferior, and medial margins  -Other findings: fibrocystic change (including microcysts) and usual ductal hyperplasia  -Calcifications associated with sclerotic stroma and with benign ducts and acini  -Prior core biopsy site change  -DCIS is estrogen receptor positive and progesterone receptor negative by immunohistochemistry (see biomarker reporting template below)      ASSESSMENT:    Marylee Woods is a 66 year old female with ductal carcinoma in situ of the LEFT breast, s/p surgery.    She is healing well.  I recommended she start moisturizing and massaging the scar with Vaseline.     We reviewed the pathology today and a copy of the report was provided. The diagnosis and treatment of ductal carcinoma in situ (DCIS) was discussed with Marylee Woods and her .  The mainstay of treatment for DCIS is surgical resection; her margins were uninvolved and further surgery is not indicated.  We discussed that adjuvant radiation is typically recommended to complete breast conservation therapy for locoregional recurrence risk reduction.  A referral to radiation oncology will be made.  A medical oncology referral will be placed to discuss endocrine therapy.  I will see her for follow up on an as needed basis.    All of the above was discussed with the patient and all questions were answered.     PLAN:  1. Radiation oncology referral  2. Medical oncology referral  3. Follow up with me ASHWIN Liu MD MSc [facility] FACS  Assistant Professor of Surgery  Division of Surgical Oncology  [facility]

## 2022-04-14 NOTE — LETTER
4/14/2022         RE: Marylee Woods  [address]  [facility] [address]        Dear Colleague,    Thank you for referring your patient, Marylee Woods, to the [facility]. Please see a copy of my visit note below.    FOLLOW-UP  Apr 14, 2022    Marylee Woods is a 66 year old female who returns for her 1st post-operative follow-up visit.    Treatment to date:  1. Left RFID seed localized lumpectomy for ADH (3/31/2022)     HPI:    She underwent a left RFID seed localized lumpectomy for ADH on 3/31/2022.  She is currently 2 week(s) post-op.  Final surgical pathology showed 7 mm of ER+ grade 2 ductal carcinoma in situ with uninvolved margins.    Since the procedure, she has been doing well. She denies any redness or swelling, or drainage from the incision, or fever/chills.  She has good range of motion and denies any upper extremity swelling.  She is having some ongoing discomfort in the breast that is managed with tylenol.    /79   Pulse 80   Temp 97.9  F (36.6  C) (Oral)   Resp 16   LMP  (LMP Unknown)   SpO2 100%    Physical Exam  Constitutional:       Appearance: She is well-developed.   Pulmonary:      Effort: No respiratory distress.   Chest:      Comments: Breast incision healing well.  No seroma. No cellulitis or abscess. No breast contour abnormality or nipple-areolar complex distortion.    Skin:     General: Skin is warm and dry.       INVESTIGATIONS:    Surgical Pathology (3/31/2022):  Final Diagnosis   LEFT breast, 3:00, RFID seed localized lumpectomy:  -Ductal carcinoma in situ (DCIS), nuclear grade 2, size 7 mm, arising in a sclerosed intraductal papilloma  -No invasive carcinoma identified  -Margins are uninvolved by DCIS  -DCIS is 4 mm from the closest (posterior) margin, 5 mm from the lateral margin, and is > 5 mm from the anterior, superior, inferior, and medial margins  -Other findings: fibrocystic change (including microcysts) and usual ductal  hyperplasia  -Calcifications associated with sclerotic stroma and with benign ducts and acini  -Prior core biopsy site change  -DCIS is estrogen receptor positive and progesterone receptor negative by immunohistochemistry (see biomarker reporting template below)     ASSESSMENT:    Marylee Woods is a 66 year old female with ductal carcinoma in situ of the LEFT breast, s/p surgery.    She is healing well.  I recommended she start moisturizing and massaging the scar with Vaseline.     We reviewed the pathology today and a copy of the report was provided. The diagnosis and treatment of ductal carcinoma in situ (DCIS) was discussed with Marylee Woods and her .  The mainstay of treatment for DCIS is surgical resection; her margins were uninvolved and further surgery is not indicated.  We discussed that adjuvant radiation is typically recommended to complete breast conservation therapy for locoregional recurrence risk reduction.  A referral to radiation oncology will be made.  A medical oncology referral will be placed to discuss endocrine therapy.  I will see her for follow up on an as needed basis.    All of the above was discussed with the patient and all questions were answered.     PLAN:  1. Radiation oncology referral  2. Medical oncology referral  3. Follow up with me PRN          Again, thank you for allowing me to participate in the care of your patient.      Sincerely,    Amanda Liu MD

## 2022-04-14 NOTE — PROGRESS NOTES
Marylee underwent a left RFID seed localized lumpectomy for ADH on 3/31/2022 with Dr. Liu.  Final surgical pathology showed 7 mm of ER+ grade 2 ductal carcinoma in situ with uninvolved margins.  Dr. Liu is referring to medical oncology and radiation oncology.      Writer received referral, reviewed for appropriate plan, and sent to New Patient Scheduling for completion.

## 2022-04-26 NOTE — PROGRESS NOTES
Marylee is a 66 year old who is being evaluated via a billable video visit.      How would you like to obtain your AVS? Mail a copy  If the video visit is dropped, the invitation should be resent by: Send to e-mail at: [email or web address]  Will anyone else be joining your video visit? No      Gracia Biggs VF    Video Start Time: 3:06  Video-Visit Details    Type of service:  Video Visit    Video End Time:3:46    Originating Location (pt. Location): Home    Distant Location (provider location):  [facility]     Platform used for Video Visit: [facility] Medical Oncology Note       Date of visit: April 28, 2022  New Outpatient Clinic Note        Assessment:     1. New diagnosis of 7 mm DCIS status postlumpectomy, in a 66-year-old woman who is on medical disability because of progressive multiple sclerosis.  She is in a wheelchair at baseline.  She has incontinence.  She can ambulate 20-25 steps with the assistance of a walker.  But we have to be careful about any therapy worsening her overall quality of life.  2. We treat ductal carcinoma in situ to prevent the development of invasive disease.  Adjuvant radiotherapy is standard in this setting.  It well, the cost of significant fatigue but hopefully this is a reversible phenomenon.  Perhaps there is less fatigue and the shorter course radiotherapy.  She should discuss all of this with Dr. Morales.  3. In terms of systemic therapy, both tamoxifen and aromatase inhibitors have been casted in this setting and have never been shown to affect survival.  They can decrease the relative risk of the development of invasive cancer, but the absolute benefit is in the low to mid single digits.  There is equivalence of 5 mg of tamoxifen for 3 years as compared to 20 mg for 5 years.  We will discuss this more when she has completed radiotherapy.  If she has had significant side effects from that, my recommendation will be to  forego systemic therapy and just be seen at 6-month intervals.    I had a long and involved conversation with Malorie Almendarez and her  Manuel during her virtual visit today.  Many questions were asked and hopefully answered to her satisfaction.        Plan:     1. Follow-up with Dr. Morales to discuss adjuvant radiotherapy  2. Return to clinic with me in 3 months and at that point we will have a more in-depth conversation, and hopefully in person, about the risks and benefits of adjuvant systemic therapy in this setting      Kenny Martinez MD, MSc  Associate Professor of Medicine  [employer]  [facility]  [address] [place] [id number]  [phone]    __________________________________________________________________    CHIEF COMPLAINT     Malorie Almendarez presents today to get an opinion regarding further evaluation and management of her recently diagnosed ductal carcinoma in situ.      History of Present Illness:     Marylee Woods is a 66 year old woman who recently developed pain in the entire LEFT breast, particularly the left upper outer quadrant.  Imaging showed a 5 mm mass at 3:00 4 cm FN in the LEFT breast. A biopsy was performed and a clip was placed.  It showed atypical ductal hyperplasia in a papilloma. Options for management were discussed with Marylee by .  On 3/31/2022 she had a left breast lumpectomy.    Final pathology showed a 7 mm area of grade 2 ductal carcinoma in situ.  There was no evidence of invasion in the specimen.  Margins were uninvolved with the closest margin being 4 mm away.  The tumor was estrogen receptor positive.    I am seeing Maryleetobrandon to weigh in with recommendations for further evaluation and management of all of this.    Review of physical symptoms finds that Malorie Almendarez is at baseline chronically disabled from her multiple sclerosis.  She spends most of the day in a wheelchair.  She has urinary incontinence.  She does do  physical therapy once a week.  With assistance from her  Manuel, she can ambulate about 20 feet with a walker.  She then gets tired.  She has issues with spasmatic muscles and is taking antispasmodics.  She has not had any specific drugs for her multiple sclerosis since 2017.  Things have been pretty stable.    In terms of other physical symptoms she denies cough or shortness of breath.  There are no other concerning areas of pain or lumps or bumps.  She denies fevers, nausea, or vomiting.  She is otherwise in her baseline state of health.      Past Medical History:   I have reviewed this patient's past medical history   Past Medical History:   Diagnosis Date     Atypical ductal hyperplasia of left breast 02/2022     Depression      Gastro-oesophageal reflux disease      Graves disease      Multiple sclerosis (H)      Osteoporosis      Postablative hypothyroidism           Past Surgical History:    I have reviewed this patient's past surgical history       Social History:   Tobacco, ETOH, and rec drugs reviewed and as noted below with the following exceptions:  Malorie Almendarez grew up in [place] and graduated from [employer] in 1973.  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  After that she was in the  of another [facility] and she really started thinking that she wanted to do something else with her life.  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  She really liked the work.  She ultimately had to retire because of progression of her multiple sclerosis.  Her  is Manuel whom she met outside of the [facility].  [place]  They  in 1985.  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  They have 3 children, Marleni, Joss, and Janeth with one 1-year-old grandson named Sami who lives in [place].  Her bucket list included seeing the [place] (done)  going to [employer] (done).  She is planning next week to go to [employer] and spent a lot of time in the [employer] themed areas since she is a big fan of those books.  She is a [employer]          Family History:     Family History   Problem Relation Age of Onset     Heart Disease Maternal Grandmother      Cancer Maternal Grandfather      Heart Disease Paternal Grandfather      Heart Disease Mother      Heart Disease Father      Diabetes No family hx of      Coronary Artery Disease No family hx of      Hypertension No family hx of      Hyperlipidemia No family hx of      Cerebrovascular Disease No family hx of      Breast Cancer No family hx of      Colon Cancer No family hx of      Prostate Cancer No family hx of      Other Cancer No family hx of      Depression No family hx of      Anxiety Disorder No family hx of      Mental Illness No family hx of      Substance Abuse No family hx of      Anesthesia Reaction No family hx of      Asthma No family hx of      Osteoporosis No family hx of      Genetic Disorder No family hx of      Thyroid Disease No family hx of      Obesity No family hx of      Unknown/Adopted No family hx of             Medications:     Current Outpatient Medications   Medication Sig Dispense Refill     acetaminophen (TYLENOL) 500 MG tablet Take 1-2 tablets (500-1,000 mg) by mouth every 8 hours as needed for mild pain or fever (greater than 101 degrees)       baclofen (LIORESAL) 10 MG tablet 2 times daily Take 2 tablets (20 MG) by mouth every morning, 1 tablet (10 MG) by mouth daily in the afternoon as needed, and 3 tablets (30 MG) by mouth every evening before bedtime.       calcium carbonate 500 mg, elemental, (OSCAL 500) 1250 (500 Ca) MG TABS tablet Take by mouth every evening       DULoxetine (CYMBALTA) 20 MG capsule Take 1 capsule (20 mg) by mouth daily Take along with 60 mg for a total of 80 mg (Patient taking differently: Take 20 mg by mouth every morning Take along with 60 mg  for a total of 80 mg) 90 capsule 3     DULoxetine (CYMBALTA) 60 MG capsule TAKE 1 CAPSULE BY MOUTH EVERY DAY (Patient taking differently: Take 60 mg by mouth every morning TAKE 1 CAPSULE BY MOUTH EVERY DAY) 90 capsule 3     famotidine (PEPCID) 40 MG tablet TAKE 1 TABLET BY MOUTH TWICE A DAY (Patient taking differently: Take 40 mg by mouth At Bedtime ) 180 tablet 1     gabapentin (NEURONTIN) 300 MG capsule Take 300 mg by mouth 2 times daily Take 2 tablets (600 MG) by mouth 3-4 times daily.       hydrochlorothiazide (HYDRODIURIL) 12.5 MG tablet Take 1 tablet (12.5 mg) by mouth daily (Patient taking differently: Take 12.5 mg by mouth every morning ) 90 tablet 3     latanoprost (XALATAN) 0.005 % ophthalmic solution INSTILL 1 DROP INTO BOTH EYES EVERY DAY AS DIRECTED PT WILL NEED TO BE SEEN FOR FUTURE REFILLS       levothyroxine (SYNTHROID/LEVOTHROID) 75 MCG tablet Take 1 tablet (75 mcg) by mouth daily (Patient taking differently: Take 75 mcg by mouth every morning ) 90 tablet 3     multivitamin w/minerals (MULTI-VITAMIN) tablet Take 1 tablet by mouth every evening       nitroFURantoin macrocrystal-monohydrate (MACROBID) 100 MG capsule Take 1 capsule (100 mg) by mouth daily (Patient taking differently: Take 100 mg by mouth every morning ) 90 capsule 1     oxybutynin (DITROPAN-XL) 10 MG 24 hr tablet Take 1 tablet (10 mg) by mouth 2 times daily 30 tablet      polyethylene glycol (MIRALAX) 17 GM/Dose powder Take 17 g by mouth daily (Patient taking differently: Take 17 g by mouth every morning ) 510 g 0     valACYclovir (VALTREX) 500 MG tablet Take 1 tablet (500 mg) by mouth 2 times daily 12 tablet 5              Physical Exam:   not currently breastfeeding.  The exam was abbreviated today because this was a virtual visit    Constitutional: WDWN female in NAD, pleasant and appropriate  Neurologic: alert, answering questions appropriately,   Psych: appropriate affect  Data:     Recent Labs   Lab Test 07/02/21  9131  07/01/21  0713 06/30/21  1717 06/10/21  0904 07/03/20  1525 11/27/18  0933 11/15/18  0711 11/12/18 0622   WBC 9.8 10.4 8.1 5.7 3.6* 3.2*  --  3.2*   NEUTROPHIL 83.8 84.8 77.5  --   --  78.0  --  76.7   HGB 12.6 12.2 14.7 12.2 9.2* 9.6*  --  7.4*    149* 194 177 181 179   < > 204    < > = values in this interval not displayed.     Recent Labs   Lab Test 02/24/22 0830 08/12/21 1033 07/09/21  1538 07/02/21 0727 07/01/21 0713 06/30/21 1717   NA  --  137 136 139 139 136   POTASSIUM  --  3.8 4.5 3.9  3.9 4.4 2.9*   CHLORIDE  --  102 102 104 109 98   CO2  --  34* 34* 25 23 32   ANIONGAP  --  1* <1* 9 7 6   BUN  --  15 9 25 12 13   CR 1.04 1.08* 0.98 0.96 0.88 0.97   JORDON 9.2 9.5 9.5 8.8 8.3* 9.6     Recent Labs   Lab Test 08/12/21 1033 07/01/21 0713 06/30/21 1717 11/02/18  0521 10/03/17  1449   MAG  --  1.8 1.9 1.8  --    PHOS 3.4  --  3.8  --   --    LDH  --   --   --   --  181     Recent Labs   Lab Test 08/12/21  1033 07/02/21  0727 07/01/21  0713 06/30/21 1717 11/01/17  0047 10/03/17  1449   BILITOTAL  --  0.7 0.8 0.7   < > 0.3   ALKPHOS  --  64 58 72   < > 78   ALT  --  19 16 21   < > 21   AST  --  30 33 26   < > 23   ALBUMIN 3.6 3.4 3.0* 4.0   < > 3.9   LDH  --   --   --   --   --  181    < > = values in this interval not displayed.       No results found for this or any previous visit (from the past 24 hour(s)).    Other Data     LEFT breast, 3:00, RFID seed localized lumpectomy:  -Ductal carcinoma in situ (DCIS), nuclear grade 2, size 7 mm, arising in a sclerosed intraductal papilloma  -No invasive carcinoma identified  -Margins are uninvolved by DCIS  -DCIS is 4 mm from the closest (posterior) margin, 5 mm from the lateral margin, and is > 5 mm from the  anterior, superior, inferior, and medial margins  -Other findings: fibrocystic change (including microcysts) and usual ductal hyperplasia  -Calcifications associated with sclerotic stroma and with benign ducts and acini  -Prior core biopsy site  change  -DCIS is estrogen receptor positive and progesterone receptor negative by immunohistochemistry (see  biomarker reporting template below)  -See tumor synoptic below  Electronically signed by Juana Palomares MD on 4/7/2022 at 10:45 PM  .  Synoptic Checklist  DCIS OF THE BREAST: Resection (DCIS OF THE BREAST: COMPLETE EXCISION - All Specimens) 8th Edition -  Protocol posted: 2/26/2020  SPECIMEN  Procedure Excision (less than total mastectomy)  Specimen Laterality Left  .      Labs, imaging and treatment plan reviewed with patient. All questions answered.        60 minutes spent on the date of the encounter doing chart review, review of outside records, review of test results, interpretation of tests, patient visit, documentation and discussion with family

## 2022-04-27 NOTE — TELEPHONE ENCOUNTER
RECORDS STATUS - BREAST    RECORDS REQUESTED FROM: Epic   DATE REQUESTED: 4/27   NOTES DETAILS STATUS   OFFICE NOTE from referring provider Amanda Watkins MD in  [facility]   OFFICE NOTE from surgeon [facility] Dr. Amanda Liu: 4/14/22       OFFICE NOTE from radiation oncologist Scheduled @  Dr. Vilma Morales: 4/28   OPERATIVE REPORT Epic 3/31/22: Localized Lumpectomy  2/11/22: US Breast Bx Core Needle, Left   MEDICATION LIST [facility] 2/25/22   LABS     PATHOLOGY REPORTS  (Tissue diagnosis, Stage, ER/RI percentage positive and intensity of staining, HER2 IHC, FISH, and all biopsies from breast and any distant metastasis)                 Epic 3/31/22, 2/11/22: Surg Path   IMAGING (NEED IMAGES & REPORT)     MAMMO PACS [facility]   ULTRASOUND PACS Epic

## 2022-04-28 ENCOUNTER — PRE VISIT (OUTPATIENT)
Dept: ONCOLOGY | Facility: CLINIC | Age: 67
End: 2022-04-28
Payer: MEDICARE

## 2022-04-28 ENCOUNTER — VIRTUAL VISIT (OUTPATIENT)
Dept: ONCOLOGY | Facility: CLINIC | Age: 67
End: 2022-04-28
Attending: SURGERY
Payer: MEDICARE

## 2022-04-28 DIAGNOSIS — D05.12 DUCTAL CARCINOMA IN SITU (DCIS) OF LEFT BREAST: ICD-10-CM

## 2022-04-28 PROCEDURE — 99205 OFFICE O/P NEW HI 60 MIN: CPT | Mod: 95 | Performed by: INTERNAL MEDICINE

## 2022-04-28 PROCEDURE — G0463 HOSPITAL OUTPT CLINIC VISIT: HCPCS | Mod: PN,RTG | Performed by: INTERNAL MEDICINE

## 2022-04-28 NOTE — LETTER
4/28/2022     RE: Marylee Woods  [address]  [facility] [address]    Dear Colleague,    Thank you for referring your patient, Marylee Woods, to the [facility]. Please see a copy of my visit note below.    Marylee is a 66 year old who is being evaluated via a billable video visit.      How would you like to obtain your AVS? Mail a copy  If the video visit is dropped, the invitation should be resent by: Send to e-mail at: [email or web address]  Will anyone else be joining your video visit? No      Gracia DAILY    Video Start Time: 3:06  Video-Visit Details    Type of service:  Video Visit    Video End Time:3:46    Originating Location (pt. Location): Home    Distant Location (provider location):  [facility] CANCER [facility]     Platform used for Video Visit: [facility] Medical Oncology Note       Date of visit: April 28, 2022  New Outpatient Clinic Note        Assessment:     1. New diagnosis of 7 mm DCIS status postlumpectomy, in a 66-year-old woman who is on medical disability because of progressive multiple sclerosis.  She is in a wheelchair at baseline.  She has incontinence.  She can ambulate 20-25 steps with the assistance of a walker.  But we have to be careful about any therapy worsening her overall quality of life.  2. We treat ductal carcinoma in situ to prevent the development of invasive disease.  Adjuvant radiotherapy is standard in this setting.  It well, the cost of significant fatigue but hopefully this is a reversible phenomenon.  Perhaps there is less fatigue and the shorter course radiotherapy.  She should discuss all of this with Dr. Morales.  3. In terms of systemic therapy, both tamoxifen and aromatase inhibitors have been casted in this setting and have never been shown to affect survival.  They can decrease the relative risk of the development of invasive cancer, but the absolute benefit is in the low to mid single  digits.  There is equivalence of 5 mg of tamoxifen for 3 years as compared to 20 mg for 5 years.  We will discuss this more when she has completed radiotherapy.  If she has had significant side effects from that, my recommendation will be to forego systemic therapy and just be seen at 6-month intervals.    I had a long and involved conversation with Malorie Almendarez and her  Manuel during her virtual visit today.  Many questions were asked and hopefully answered to her satisfaction.        Plan:     1. Follow-up with Dr. Morales to discuss adjuvant radiotherapy  2. Return to clinic with me in 3 months and at that point we will have a more in-depth conversation, and hopefully in person, about the risks and benefits of adjuvant systemic therapy in this setting      Kenny Martinez MD, MSc  Associate Professor of Medicine  [facility] [place] [address]  [phone]    __________________________________________________________________    CHIEF COMPLAINT     Malorie Almendarez presents today to get an opinion regarding further evaluation and management of her recently diagnosed ductal carcinoma in situ.      History of Present Illness:     Marylee Woods is a 66 year old woman who recently developed pain in the entire LEFT breast, particularly the left upper outer quadrant.  Imaging showed a 5 mm mass at 3:00 4 cm FN in the LEFT breast. A biopsy was performed and a clip was placed.  It showed atypical ductal hyperplasia in a papilloma. Options for management were discussed with Marylee by .  On 3/31/2022 she had a left breast lumpectomy.    Final pathology showed a 7 mm area of grade 2 ductal carcinoma in situ.  There was no evidence of invasion in the specimen.  Margins were uninvolved with the closest margin being 4 mm away.  The tumor was estrogen receptor positive.    I am seeing Maryleetoday to weigh in with recommendations for further evaluation  and management of all of this.    Review of physical symptoms finds that Malorie Almendarez is at baseline chronically disabled from her multiple sclerosis.  She spends most of the day in a wheelchair.  She has urinary incontinence.  She does do physical therapy once a week.  With assistance from her  Manuel, she can ambulate about 20 feet with a walker.  She then gets tired.  She has issues with spasmatic muscles and is taking antispasmodics.  She has not had any specific drugs for her multiple sclerosis since 2017.  Things have been pretty stable.    In terms of other physical symptoms she denies cough or shortness of breath.  There are no other concerning areas of pain or lumps or bumps.  She denies fevers, nausea, or vomiting.  She is otherwise in her baseline state of health.      Past Medical History:   I have reviewed this patient's past medical history   Past Medical History:   Diagnosis Date     Atypical ductal hyperplasia of left breast 02/2022     Depression      Gastro-oesophageal reflux disease      Graves disease      Multiple sclerosis (H)      Osteoporosis      Postablative hypothyroidism           Past Surgical History:    I have reviewed this patient's past surgical history       Social History:   Tobacco, ETOH, and rec drugs reviewed and as noted below with the following exceptions:  Malorie Almendarez grew up in [place] and graduated from [employer] in 1973.  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  After that she was in the  of another [employer] and she really started thinking that she wanted to do something else with her life.  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  She really liked the work.  She ultimately had to retire because of progression of her multiple sclerosis.  Her  is Manuel whom she met outside of the [employer] [facility].  [place] were [place] those [place].   They  in 1985.  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  They have 3 children, Marleni, Joss, and Janeth with one 1-year-old grandson named Sami who lives in [place].  Her bucket list included seeing the [place] in [place] (done) going to [employer] (done).  She is planning next week to go to [employer] and spent a lot of time in the [employer] themed areas since she is a big fan of those books.  She is a [employer]          Family History:     Family History   Problem Relation Age of Onset     Heart Disease Maternal Grandmother      Cancer Maternal Grandfather      Heart Disease Paternal Grandfather      Heart Disease Mother      Heart Disease Father      Diabetes No family hx of      Coronary Artery Disease No family hx of      Hypertension No family hx of      Hyperlipidemia No family hx of      Cerebrovascular Disease No family hx of      Breast Cancer No family hx of      Colon Cancer No family hx of      Prostate Cancer No family hx of      Other Cancer No family hx of      Depression No family hx of      Anxiety Disorder No family hx of      Mental Illness No family hx of      Substance Abuse No family hx of      Anesthesia Reaction No family hx of      Asthma No family hx of      Osteoporosis No family hx of      Genetic Disorder No family hx of      Thyroid Disease No family hx of      Obesity No family hx of      Unknown/Adopted No family hx of             Medications:     Current Outpatient Medications   Medication Sig Dispense Refill     acetaminophen (TYLENOL) 500 MG tablet Take 1-2 tablets (500-1,000 mg) by mouth every 8 hours as needed for mild pain or fever (greater than 101 degrees)       baclofen (LIORESAL) 10 MG tablet 2 times daily Take 2 tablets (20 MG) by mouth every morning, 1 tablet (10 MG) by mouth daily in the afternoon as needed, and 3 tablets (30 MG) by mouth every evening before bedtime.       calcium carbonate 500 mg, elemental, (OSCAL 500) 1250 (500 Ca) MG TABS tablet  Take by mouth every evening       DULoxetine (CYMBALTA) 20 MG capsule Take 1 capsule (20 mg) by mouth daily Take along with 60 mg for a total of 80 mg (Patient taking differently: Take 20 mg by mouth every morning Take along with 60 mg for a total of 80 mg) 90 capsule 3     DULoxetine (CYMBALTA) 60 MG capsule TAKE 1 CAPSULE BY MOUTH EVERY DAY (Patient taking differently: Take 60 mg by mouth every morning TAKE 1 CAPSULE BY MOUTH EVERY DAY) 90 capsule 3     famotidine (PEPCID) 40 MG tablet TAKE 1 TABLET BY MOUTH TWICE A DAY (Patient taking differently: Take 40 mg by mouth At Bedtime ) 180 tablet 1     gabapentin (NEURONTIN) 300 MG capsule Take 300 mg by mouth 2 times daily Take 2 tablets (600 MG) by mouth 3-4 times daily.       hydrochlorothiazide (HYDRODIURIL) 12.5 MG tablet Take 1 tablet (12.5 mg) by mouth daily (Patient taking differently: Take 12.5 mg by mouth every morning ) 90 tablet 3     latanoprost (XALATAN) 0.005 % ophthalmic solution INSTILL 1 DROP INTO BOTH EYES EVERY DAY AS DIRECTED PT WILL NEED TO BE SEEN FOR FUTURE REFILLS       levothyroxine (SYNTHROID/LEVOTHROID) 75 MCG tablet Take 1 tablet (75 mcg) by mouth daily (Patient taking differently: Take 75 mcg by mouth every morning ) 90 tablet 3     multivitamin w/minerals (MULTI-VITAMIN) tablet Take 1 tablet by mouth every evening       nitroFURantoin macrocrystal-monohydrate (MACROBID) 100 MG capsule Take 1 capsule (100 mg) by mouth daily (Patient taking differently: Take 100 mg by mouth every morning ) 90 capsule 1     oxybutynin (DITROPAN-XL) 10 MG 24 hr tablet Take 1 tablet (10 mg) by mouth 2 times daily 30 tablet      polyethylene glycol (MIRALAX) 17 GM/Dose powder Take 17 g by mouth daily (Patient taking differently: Take 17 g by mouth every morning ) 510 g 0     valACYclovir (VALTREX) 500 MG tablet Take 1 tablet (500 mg) by mouth 2 times daily 12 tablet 5              Physical Exam:   not currently breastfeeding.  The exam was abbreviated today  because this was a virtual visit    Constitutional: WDWN female in NAD, pleasant and appropriate  Neurologic: alert, answering questions appropriately,   Psych: appropriate affect  Data:     Recent Labs   Lab Test 07/02/21  0727 07/01/21  0713 06/30/21  1717 06/10/21  0904 07/03/20  1525 11/27/18  0933 11/15/18  0711 11/12/18  0622   WBC 9.8 10.4 8.1 5.7 3.6* 3.2*  --  3.2*   NEUTROPHIL 83.8 84.8 77.5  --   --  78.0  --  76.7   HGB 12.6 12.2 14.7 12.2 9.2* 9.6*  --  7.4*    149* 194 177 181 179   < > 204    < > = values in this interval not displayed.     Recent Labs   Lab Test 02/24/22 0830 08/12/21 1033 07/09/21  1538 07/02/21 0727 07/01/21 0713 06/30/21 1717   NA  --  137 136 139 139 136   POTASSIUM  --  3.8 4.5 3.9  3.9 4.4 2.9*   CHLORIDE  --  102 102 104 109 98   CO2  --  34* 34* 25 23 32   ANIONGAP  --  1* <1* 9 7 6   BUN  --  15 9 25 12 13   CR 1.04 1.08* 0.98 0.96 0.88 0.97   JORDON 9.2 9.5 9.5 8.8 8.3* 9.6     Recent Labs   Lab Test 08/12/21 1033 07/01/21 0713 06/30/21 1717 11/02/18  0521 10/03/17  1449   MAG  --  1.8 1.9 1.8  --    PHOS 3.4  --  3.8  --   --    LDH  --   --   --   --  181     Recent Labs   Lab Test 08/12/21 1033 07/02/21 0727 07/01/21 0713 06/30/21 1717 11/01/17  0047 10/03/17  1449   BILITOTAL  --  0.7 0.8 0.7   < > 0.3   ALKPHOS  --  64 58 72   < > 78   ALT  --  19 16 21   < > 21   AST  --  30 33 26   < > 23   ALBUMIN 3.6 3.4 3.0* 4.0   < > 3.9   LDH  --   --   --   --   --  181    < > = values in this interval not displayed.     No results found for this or any previous visit (from the past 24 hour(s)).    Other Data     LEFT breast, 3:00, RFID seed localized lumpectomy:  -Ductal carcinoma in situ (DCIS), nuclear grade 2, size 7 mm, arising in a sclerosed intraductal papilloma  -No invasive carcinoma identified  -Margins are uninvolved by DCIS  -DCIS is 4 mm from the closest (posterior) margin, 5 mm from the lateral margin, and is > 5 mm from the  anterior, superior,  inferior, and medial margins  -Other findings: fibrocystic change (including microcysts) and usual ductal hyperplasia  -Calcifications associated with sclerotic stroma and with benign ducts and acini  -Prior core biopsy site change  -DCIS is estrogen receptor positive and progesterone receptor negative by immunohistochemistry (see  biomarker reporting template below)  -See tumor synoptic below  Electronically signed by Juana Palomares MD on 4/7/2022 at 10:45 PM  .  Synoptic Checklist  DCIS OF THE BREAST: Resection (DCIS OF THE BREAST: COMPLETE EXCISION - All Specimens) 8th Edition -  Protocol posted: 2/26/2020  SPECIMEN  Procedure Excision (less than total mastectomy)  Specimen Laterality Left  .  Labs, imaging and treatment plan reviewed with patient. All questions answered.    60 minutes spent on the date of the encounter doing chart review, review of outside records, review of test results, interpretation of tests, patient visit, documentation and discussion with family       Kenny Martinez MD

## 2022-04-29 NOTE — PROGRESS NOTES
Department of Radiation Oncology                   [facility] [address]  [address] [place]  [address]  Office:  [phone]  Fax:  [phone]   Radiation Oncology Clinic  [address] [place] [address]  Phone:  [phone]  Fax:  [phone]     RE: Marylee Woods : 1955   MRN: [id number] PHILLY: 2022     OUTPATIENT VISIT NOTE       PROBLEM: DCIS of the left breast, status post lumpectomy, grade 2, ER+/MI-     was seen for initial consultation in the Dept of Therapeutic Radiology on 5/3/2022 at the request of Dr. Liu.     HISTORY OF PRESENT ILLNESS: Ms. Marinelli is a 67 yo female with a newly diagnosed DCIS of the left breast. She saw primary care Ms. Maru Tipton in 2022, during which time, she reported diffuse left breast pain that started earlier in the year. Diagnostic mammogram on 2/3/2022 showed a partially obscured mass 3:00 mid breast. On the ultrasound, there was a 5 mm solid appearing mass with a slightly indistinct margin at 3:00 4 cm from the nipple. Contrast mammogram on 2022 did confirm a 9 mm area of enhancement at this location. Ultrasound guided biopsy revealed atypical ductal hyperplasia involving a sclerosed papillary lesion, felt to be concordant with radiological finding.     She saw Dr. Liu, and proceeded with RFID Seed-localized lumpectomy on 3/31/2022. Pathology demonstrated DCIS, nuclear grade 2, measuring 7 mm in greatest dimension, arising in a sclerosed intraductal papilloma. Closest margin was 4 mm posteriorly. DCIS was ER 95% strongly positive, MI negative. She met with Dr. Martinez, who dicussed adjuvant endocrine therapy with Tamoxifen but emphasized the importance of maintaining her quality of life.     Her medical history is significant for Multiple Sclerosis. She was diagnosed in . She has received disease modifying drugs, but has not required any MS specific medication in the last 3 or 4 years. She has left sided  weakness and numbness and suffered a few fractures in the hip, tibia and femur, and is largely wheel chair bound. She can ambulate small amount of steps with walker. She has baseline incontinence. She enjoys traveling and is leaving for [place] [employer] for [employer].     PAST MEDICAL HISTORY:   Past Medical History:   Diagnosis Date     Atypical ductal hyperplasia of left breast 02/2022     Depression      Gastro-oesophageal reflux disease      Graves disease      Multiple sclerosis (H)      Osteoporosis      Postablative hypothyroidism      Past Surgical History:   Procedure Laterality Date     C/SECTION, LOW TRANSVERSE  1992     COLONOSCOPY  2007     COLONOSCOPY N/A 1/26/2017    Procedure: COMBINED COLONOSCOPY, SINGLE OR MULTIPLE BIOPSY/POLYPECTOMY BY BIOPSY;  Surgeon: Mayo Adkins MD, MD;  Location:  GI     ESOPHAGOSCOPY, GASTROSCOPY, DUODENOSCOPY (EGD), COMBINED  1/26/2017    Dr. Adkins [facility]     ESOPHAGOSCOPY, GASTROSCOPY, DUODENOSCOPY (EGD), COMBINED N/A 1/26/2017    Procedure: COMBINED ESOPHAGOSCOPY, GASTROSCOPY, DUODENOSCOPY (EGD), BIOPSY SINGLE OR MULTIPLE;  Surgeon: Mayo Adkins MD, MD;  Location:  GI     HIP SURGERY  2009    femur ortho surgery     LAPAROSCOPIC CHOLECYSTECTOMY  6/24/2014    Procedure: LAPAROSCOPIC CHOLECYSTECTOMY;  Surgeon: Randy Bailey MD;  Location:  [facility]     LUMPECTOMY BREAST Left 3/31/2022    Procedure: LEFT Radiofrequency Identification Seed-Localized Lumpectomy;  Surgeon: Amanda Liu MD;  [facility] [facility] [place]     OPEN REDUCTION INTERNAL FIXATION FEMUR DISTAL Left 11/1/2018    Procedure: OPEN REDUCTION INTERNAL FIXATION LEFT FEMUR DISTAL;  Surgeon: Jose Valadez MD;  Location: [facility] OR     OPEN REDUCTION INTERNAL FIXATION RODDING INTRAMEDULLARY TIBIA  4/14/2013    Procedure: OPEN REDUCTION INTERNAL FIXATION RODDING INTRAMEDULLARY TIBIA;;  Surgeon: Sajan Cast MD;  Location:  OR     ORTHOPEDIC SURGERY  2009    surgery right upper  femur fx near hip          CHEMOTHERAPY HISTORY:   None  Possible endocrine therapy      PAST RADIATION THERAPY HISTORY: None    MEDICATIONS:   Current Outpatient Medications   Medication Sig Dispense Refill     acetaminophen (TYLENOL) 500 MG tablet Take 1-2 tablets (500-1,000 mg) by mouth every 8 hours as needed for mild pain or fever (greater than 101 degrees)       baclofen (LIORESAL) 10 MG tablet 2 times daily Take 2 tablets (20 MG) by mouth every morning, 1 tablet (10 MG) by mouth daily in the afternoon as needed, and 3 tablets (30 MG) by mouth every evening before bedtime.       calcium carbonate 500 mg, elemental, (OSCAL 500) 1250 (500 Ca) MG TABS tablet Take by mouth every evening       DULoxetine (CYMBALTA) 20 MG capsule Take 1 capsule (20 mg) by mouth daily Take along with 60 mg for a total of 80 mg (Patient taking differently: Take 20 mg by mouth every morning Take along with 60 mg for a total of 80 mg) 90 capsule 3     DULoxetine (CYMBALTA) 60 MG capsule TAKE 1 CAPSULE BY MOUTH EVERY DAY (Patient taking differently: Take 60 mg by mouth every morning TAKE 1 CAPSULE BY MOUTH EVERY DAY) 90 capsule 3     famotidine (PEPCID) 40 MG tablet TAKE 1 TABLET BY MOUTH TWICE A DAY (Patient taking differently: Take 40 mg by mouth At Bedtime) 180 tablet 1     gabapentin (NEURONTIN) 300 MG capsule Take 300 mg by mouth 2 times daily Take 2 tablets (600 MG) by mouth 3-4 times daily.       hydrochlorothiazide (HYDRODIURIL) 12.5 MG tablet Take 1 tablet (12.5 mg) by mouth daily (Patient taking differently: Take 12.5 mg by mouth every morning) 90 tablet 3     latanoprost (XALATAN) 0.005 % ophthalmic solution INSTILL 1 DROP INTO BOTH EYES EVERY DAY AS DIRECTED PT WILL NEED TO BE SEEN FOR FUTURE REFILLS       levothyroxine (SYNTHROID/LEVOTHROID) 75 MCG tablet Take 1 tablet (75 mcg) by mouth daily (Patient taking differently: Take 75 mcg by mouth every morning) 90 tablet 3     multivitamin w/minerals (THERA-VIT-M) tablet Take 1  tablet by mouth every evening       nitroFURantoin macrocrystal-monohydrate (MACROBID) 100 MG capsule Take 1 capsule (100 mg) by mouth daily (Patient taking differently: Take 100 mg by mouth every morning) 90 capsule 1     oxybutynin (DITROPAN-XL) 10 MG 24 hr tablet Take 1 tablet (10 mg) by mouth 2 times daily 30 tablet      polyethylene glycol (MIRALAX) 17 GM/Dose powder Take 17 g by mouth daily (Patient taking differently: Take 17 g by mouth every morning) 510 g 0     valACYclovir (VALTREX) 500 MG tablet Take 1 tablet (500 mg) by mouth 2 times daily 12 tablet 5       ALLERGIES:  allergic to amantadine, budesonide, and symbicort.    SOCIAL HISTORY:   Quit smoking in 01/2022, but now picked up again, about   Occasional cigars   to  Manuel  3 children: Marleni, Joss, and Janeth. Grandson Sami.       FAMILY HISTORY:   Maternal grandfather: throat cancer  Unsure of paternal family history    REVIEW OF SYMPTOMS:  A full 14-point review of systems was performed. See HPI for details. Positives are primarily related to her multiple sclerosis.     PHYSICAL EXAMINATION:    LMP  (LMP Unknown)   /70   Pulse 72   Temp 98.2  F (36.8  C) (Oral)   LMP  (LMP Unknown)   SpO2 100%    Gen: Sitting in wheel chair, NAD  HEENT: unremarkable  Breasts: deferred  Resp: breathing comfortably on room air  CV: well perfused.    IMAGING:      ASSESSMENT AND PLAN: In summary, Ms. Marinelli is a 67 yo female with a DCIS of the left breast, status post lumpectomy. Pathology was significant for a nuclear grade 2 DCIS, 7 mm in size with 4 mm margin. It was ER+/WY-.    I discussed the biology and natural history of DCIS.  I explained that DCIS is a precancerous lesion. The goal of the treatment is to prevent invasive recurrence. We then discussed risk factors for recurrence, including age, size, margin, and nuclear grade. She fits the inclusion criteria for ECOG 5194, with tumor size and margin being fairly similar  to the median statistics of the patients enrolled. Thus, her risk of recurrence is likely to fall in the range reported by the study, i.e., about 14% ipsilateral breast recurrence in the next 12 years, with half being DCIS recurrence and half being invasive recurrence.     We next discussed that adjuvant radiation therapy typically reduces the local recurrence by about 50%. If she were to proceed with the treatment, she would be a candidate for the FAST-FORWARD regimen of 2600 cGy delivered over 5 fractions. I described the simulation and the potential side effects. I am unclear if she would be able to tolerate deep inspiration breath hold to heart sparing. If not, I plan to use multileaf collimator for complete heart blocking.     Ms. Marinelli has multiple sclerosis and is wheel chair bound. However, she is maintaining a reasonable quality of life and has many things that she enjoys in life. As such, I think it is reasonable to consider treatment to optimize local control. But I did also discuss the option of omitting radiation therapy and the balance of risks and benefits. I discussed that treatment does not carry a survival benefit. She would like to proceed with the treatment.    She is leaving for [place] [employer] and will be back on 5/16.  We will reach out to her for a simulation, likely later that week.      Thank you for allowing us to participate in this patient's care.  Please feel free to call with any questions or concerns.       Vilma Morales M.D./Ph.D.  Radiation Oncologist   Department of Radiation Oncology  [facility]  Phone: [phone]         I reviewed patient's chart, internal/external medical records, imaging studies (including actual images), labs and pathology reports.  I interviewed and counseled the patient face to face.     100 minutes were spent on the date of the encounter doing chart review, history and exam, documentation and further activities as noted  above.           Vilma Morales MD

## 2022-05-05 ENCOUNTER — OFFICE VISIT (OUTPATIENT)
Dept: RADIATION ONCOLOGY | Facility: CLINIC | Age: 67
End: 2022-05-05
Attending: SURGERY
Payer: MEDICARE

## 2022-05-05 VITALS
SYSTOLIC BLOOD PRESSURE: 120 MMHG | TEMPERATURE: 98.2 F | HEART RATE: 72 BPM | DIASTOLIC BLOOD PRESSURE: 70 MMHG | OXYGEN SATURATION: 100 %

## 2022-05-05 DIAGNOSIS — D05.12 DUCTAL CARCINOMA IN SITU (DCIS) OF LEFT BREAST: ICD-10-CM

## 2022-05-05 PROCEDURE — G0463 HOSPITAL OUTPT CLINIC VISIT: HCPCS

## 2022-05-05 ASSESSMENT — PAIN SCALES - GENERAL: PAINLEVEL: NO PAIN (0)

## 2022-05-05 NOTE — NURSING NOTE
"Oncology Rooming Note    May 5, 2022 8:22 AM   Marylee Woods is a 66 year old female who presents for:    Chief Complaint   Patient presents with     Oncology Clinic Visit     New eval for DCIS of left breast     Initial Vitals: /70   Pulse 72   Temp 98.2  F (36.8  C) (Oral)   LMP  (LMP Unknown)   SpO2 100%  Estimated body mass index is 19.34 kg/m  as calculated from the following:    Height as of 3/31/22: 1.746 m (5' 8.74\").    Weight as of 3/31/22: 59 kg (130 lb). There is no height or weight on file to calculate BSA.  No Pain (0) Comment: Data Unavailable   No LMP recorded (lmp unknown). Patient is postmenopausal.  Allergies reviewed: Yes  Medications reviewed: Yes    Medications: Medication refills not needed today.  Pharmacy name entered into EPIC:    [id number] [place], [place] - [address] [facility] [place], [place] - [address] [place] [address] PHARM[address] [place] [address] - [address] [place] [address] PHARMACY [place], [place] - [address] [facility] [phone]    Clinical concerns: none       Morelia Hamlin, EMT            "

## 2022-05-05 NOTE — LETTER
2022         RE: Marylee Woods  [address]  [facility] [address]        Dear Colleague,    Thank you for referring your patient, Marylee Woods, to the [facility] RADIATION ONCOLOGY [place]. Please see a copy of my visit note below.    Department of Radiation Oncology                   [place] Mail [address]  [address] [place]  [address]  Office:  [phone]  Fax:  [phone]   Radiation Oncology Clinic  [address] [place] [address]  Phone:  [phone]  Fax:  [phone]     RE: Marylee Woods : 1955   MRN: [id number] PHILLY: 2022     OUTPATIENT VISIT NOTE       PROBLEM: DCIS of the left breast, status post lumpectomy, grade 2, ER+/ND-     was seen for initial consultation in the Dept of Therapeutic Radiology on 5/3/2022 at the request of Dr. Liu.     HISTORY OF PRESENT ILLNESS: Ms. Marinelli is a 65 yo female with a newly diagnosed DCIS of the left breast. She saw primary care Ms. Maru Tipton in 2022, during which time, she reported diffuse left breast pain that started earlier in the year. Diagnostic mammogram on 2/3/2022 showed a partially obscured mass 3:00 mid breast. On the ultrasound, there was a 5 mm solid appearing mass with a slightly indistinct margin at 3:00 4 cm from the nipple. Contrast mammogram on 2022 did confirm a 9 mm area of enhancement at this location. Ultrasound guided biopsy revealed atypical ductal hyperplasia involving a sclerosed papillary lesion, felt to be concordant with radiological finding.     She saw Dr. Liu, and proceeded with RFID Seed-localized lumpectomy on 3/31/2022. Pathology demonstrated DCIS, nuclear grade 2, measuring 7 mm in greatest dimension, arising in a sclerosed intraductal papilloma. Closest margin was 4 mm posteriorly. DCIS was ER 95% strongly positive, ND negative. She met with Dr. Martinez, who dicussed adjuvant endocrine therapy with Tamoxifen but emphasized the importance of  maintaining her quality of life.     Her medical history is significant for Multiple Sclerosis. She was diagnosed in 1993. She has received disease modifying drugs, but has not required any MS specific medication in the last 3 or 4 years. She has left sided weakness and numbness and suffered a few fractures in the hip, tibia and femur, and is largely wheel chair bound. She can ambulate small amount of steps with walker. She has baseline incontinence. She enjoys traveling and is leaving for [place] [employer] for [employer].     PAST MEDICAL HISTORY:   Past Medical History:   Diagnosis Date     Atypical ductal hyperplasia of left breast 02/2022     Depression      Gastro-oesophageal reflux disease      Graves disease      Multiple sclerosis (H)      Osteoporosis      Postablative hypothyroidism      Past Surgical History:   Procedure Laterality Date     C/SECTION, LOW TRANSVERSE  1992     COLONOSCOPY  2007     COLONOSCOPY N/A 1/26/2017    Procedure: COMBINED COLONOSCOPY, SINGLE OR MULTIPLE BIOPSY/POLYPECTOMY BY BIOPSY;  Surgeon: Mayo Adkins MD, MD;  Location:  GI     ESOPHAGOSCOPY, GASTROSCOPY, DUODENOSCOPY (EGD), COMBINED  1/26/2017    Dr. Adkins [facility]     ESOPHAGOSCOPY, GASTROSCOPY, DUODENOSCOPY (EGD), COMBINED N/A 1/26/2017    Procedure: COMBINED ESOPHAGOSCOPY, GASTROSCOPY, DUODENOSCOPY (EGD), BIOPSY SINGLE OR MULTIPLE;  Surgeon: Mayo Adkins MD, MD;  Location:  GI     HIP SURGERY  2009    femur ortho surgery     LAPAROSCOPIC CHOLECYSTECTOMY  6/24/2014    Procedure: LAPAROSCOPIC CHOLECYSTECTOMY;  Surgeon: Randy Bailey MD;  Location:  SD     LUMPECTOMY BREAST Left 3/31/2022    Procedure: LEFT Radiofrequency Identification Seed-Localized Lumpectomy;  Surgeon: Amanda Liu MD;  Location: [facility] OR     OPEN REDUCTION INTERNAL FIXATION FEMUR DISTAL Left 11/1/2018    Procedure: OPEN REDUCTION INTERNAL FIXATION LEFT FEMUR DISTAL;  Surgeon: Jose Valadez MD;  Location:   OR     OPEN REDUCTION INTERNAL FIXATION RODDING INTRAMEDULLARY TIBIA  4/14/2013    Procedure: OPEN REDUCTION INTERNAL FIXATION RODDING INTRAMEDULLARY TIBIA;;  Surgeon: Sajan Cast MD;  Location:  OR     ORTHOPEDIC SURGERY  2009    surgery right upper femur fx near hip          CHEMOTHERAPY HISTORY:   None  Possible endocrine therapy      PAST RADIATION THERAPY HISTORY: None    MEDICATIONS:   Current Outpatient Medications   Medication Sig Dispense Refill     acetaminophen (TYLENOL) 500 MG tablet Take 1-2 tablets (500-1,000 mg) by mouth every 8 hours as needed for mild pain or fever (greater than 101 degrees)       baclofen (LIORESAL) 10 MG tablet 2 times daily Take 2 tablets (20 MG) by mouth every morning, 1 tablet (10 MG) by mouth daily in the afternoon as needed, and 3 tablets (30 MG) by mouth every evening before bedtime.       calcium carbonate 500 mg, elemental, (OSCAL 500) 1250 (500 Ca) MG TABS tablet Take by mouth every evening       DULoxetine (CYMBALTA) 20 MG capsule Take 1 capsule (20 mg) by mouth daily Take along with 60 mg for a total of 80 mg (Patient taking differently: Take 20 mg by mouth every morning Take along with 60 mg for a total of 80 mg) 90 capsule 3     DULoxetine (CYMBALTA) 60 MG capsule TAKE 1 CAPSULE BY MOUTH EVERY DAY (Patient taking differently: Take 60 mg by mouth every morning TAKE 1 CAPSULE BY MOUTH EVERY DAY) 90 capsule 3     famotidine (PEPCID) 40 MG tablet TAKE 1 TABLET BY MOUTH TWICE A DAY (Patient taking differently: Take 40 mg by mouth At Bedtime) 180 tablet 1     gabapentin (NEURONTIN) 300 MG capsule Take 300 mg by mouth 2 times daily Take 2 tablets (600 MG) by mouth 3-4 times daily.       hydrochlorothiazide (HYDRODIURIL) 12.5 MG tablet Take 1 tablet (12.5 mg) by mouth daily (Patient taking differently: Take 12.5 mg by mouth every morning) 90 tablet 3     latanoprost (XALATAN) 0.005 % ophthalmic solution INSTILL 1 DROP INTO BOTH EYES EVERY DAY AS DIRECTED PT WILL NEED  TO BE SEEN FOR FUTURE REFILLS       levothyroxine (SYNTHROID/LEVOTHROID) 75 MCG tablet Take 1 tablet (75 mcg) by mouth daily (Patient taking differently: Take 75 mcg by mouth every morning) 90 tablet 3     multivitamin w/minerals (THERA-VIT-M) tablet Take 1 tablet by mouth every evening       nitroFURantoin macrocrystal-monohydrate (MACROBID) 100 MG capsule Take 1 capsule (100 mg) by mouth daily (Patient taking differently: Take 100 mg by mouth every morning) 90 capsule 1     oxybutynin (DITROPAN-XL) 10 MG 24 hr tablet Take 1 tablet (10 mg) by mouth 2 times daily 30 tablet      polyethylene glycol (MIRALAX) 17 GM/Dose powder Take 17 g by mouth daily (Patient taking differently: Take 17 g by mouth every morning) 510 g 0     valACYclovir (VALTREX) 500 MG tablet Take 1 tablet (500 mg) by mouth 2 times daily 12 tablet 5       ALLERGIES:  allergic to amantadine, budesonide, and symbicort.    SOCIAL HISTORY:   Quit smoking in 01/2022, but now picked up again, about   Occasional cigars   to  [place]  3 children: Marleni, Joss, and Janeth. Grandson Sami.       FAMILY HISTORY:   Maternal grandfather: throat cancer  Unsure of paternal family history    REVIEW OF SYMPTOMS:  A full 14-point review of systems was performed. See HPI for details. Positives are primarily related to her multiple sclerosis.     PHYSICAL EXAMINATION:    LMP  (LMP Unknown)   /70   Pulse 72   Temp 98.2  F (36.8  C) (Oral)   LMP  (LMP Unknown)   SpO2 100%    Gen: Sitting in wheel chair, NAD  HEENT: unremarkable  Breasts: deferred  Resp: breathing comfortably on room air  CV: well perfused.    IMAGING:      ASSESSMENT AND PLAN: In summary, Ms. Marinelli is a 65 yo female with a DCIS of the left breast, status post lumpectomy. Pathology was significant for a nuclear grade 2 DCIS, 7 mm in size with 4 mm margin. It was ER+/TN-.    I discussed the biology and natural history of DCIS.  I explained that DCIS is a precancerous  lesion. The goal of the treatment is to prevent invasive recurrence. We then discussed risk factors for recurrence, including age, size, margin, and nuclear grade. She fits the inclusion criteria for ECOG 5194, with tumor size and margin being fairly similar to the median statistics of the patients enrolled. Thus, her risk of recurrence is likely to fall in the range reported by the study, i.e., about 14% ipsilateral breast recurrence in the next 12 years, with half being DCIS recurrence and half being invasive recurrence.     We next discussed that adjuvant radiation therapy typically reduces the local recurrence by about 50%. If she were to proceed with the treatment, she would be a candidate for the FAST-FORWARD regimen of 2600 cGy delivered over 5 fractions. I described the simulation and the potential side effects. I am unclear if she would be able to tolerate deep inspiration breath hold to heart sparing. If not, I plan to use multileaf collimator for complete heart blocking.     Ms. Marinelli has multiple sclerosis and is wheel chair bound. However, she is maintaining a reasonable quality of life and has many things that she enjoys in life. As such, I think it is reasonable to consider treatment to optimize local control. But I did also discuss the option of omitting radiation therapy and the balance of risks and benefits. I discussed that treatment does not carry a survival benefit. She would like to proceed with the treatment.    She is leaving for [place] and will be back on 5/16.  We will reach out to her for a simulation, likely later that week.      Thank you for allowing us to participate in this patient's care.  Please feel free to call with any questions or concerns.       Vilma Morales M.D./Ph.D.  Radiation Oncologist   Department of Radiation Oncology  [facility]  Phone: [phone]         I reviewed patient's chart, internal/external medical records, imaging  studies (including actual images), labs and pathology reports.  I interviewed and counseled the patient face to face.     100 minutes were spent on the date of the encounter doing chart review, history and exam, documentation and further activities as noted above.       Vilma Morales MD

## 2022-05-15 NOTE — PROGRESS NOTES
Department of Therapeutic Radiology--Radiation Oncology                   [place] Mail [address]  [address] [place]  [address]  Office:  [phone]  Fax:  [phone]   Radiation Oncology Clinic  [address] [place] [address]  Phone:  [phone]  Fax:  [phone]     RE: Marylee Woods : 1955   MRN: [id number] PHILLY: 5/15/2022     OUTPATIENT VISIT NOTE       DIAGNOSIS:  DCIS, nuclear grade 2, of the left breast, s/p lumpectomy  STAGE: Stage 0 pTispNx    HPI: Ms. Marinelli is a 67 year old female with MS and a recent diagnosis of DCIS, nuclear grade 2, of the left breast, now s/p lumpectomy.     Ms. Marinelli was last seen by Dr. Morales on 22.     Briefly, she initially presented after developing diffuse left breast pain in early .  Diagnostic mammogram and targeted US completed 2/3/22 demonstrated a 5 mm mass at the 3 oclock position, 4cm from the nipple. Follow up contrast mammography completed 22 demonstrated enhancement measuring 9 mm at the 3 oclock position, mid-depth. Biopsy of the site completed the same day showed atypical ductal hyperplasia involving a sclerosed papillary lesion, as well as calcifications associated with sclerotic stroma.     She underwent lumpectomy with Dr. Liu on 3/31/22. Surgical pathology demonstrated 7mm nuclear grade 2 DCIS arising from a sclerosed intraductal papilloma, ER+ (95%)/OK- (<1%). Margins were uninvolved by DCIS. The closest margin was 4 mm (posterior).     She was seen by Dr. Martinez of medical oncology on 22 for discussion of adjuvant endocrine therapy with tamoxifen vs an aromatase inhibitor, with plans for follow up discussion in 3 months.     During her visit with Dr. Morales on 22, the natural history of DCIS and role of adjuvant radiation for DCIS were discussed. Specifically, it was discussed that adjuvant radiation reduces the risk of local recurrence, but does not impact survival. It was discussed that adjuvant  radiation would be appropriate for Ms. Marinelli given her ongoing reasonable quality of life with her diagnosis of MS. It was determined that Ms. Marinelli would be a candidate for whole breast irradiation according to the FAST-FORWARD protocol with 2600cGy in 5 fractions. Ms. Marinelli elected to proceed with adjuvant radiation.     Ms. Marinelli was diagnosed with MS in 1993. She previously has required disease modified drugs, but has not been on MS targeted medications in the past 3-4 years. At baseline, she has left sided weakness and numbness, incontinence, and is largely wheel chair bound, but can ambulate a few steps with a walker.     INTERVAL HISTORY:   Ms. Marinelli has returned from her vacation and presents today for follow up to sign consent and undergo CT simulation.     On interview today, Ms. Marinelli is doing well. She had a great vacation to the [employer] [place] [employer]. Overall, she remains at baseline. Denies pain, changes in energy level, changes in appetite or weight loss. She is ready to proceed with radiation.      OBJECTIVE:   Physical exam:   General: Well appearing woman in no acute distress. Sitting in wheel chair  HEENT: Normocephalic and atraumatic. EOMI. Mucous membranes moist.   CV: Regular rate, well perfused  Resp: Breathing comfortably on room air. No audible breath sounds, wheezes, or ronchi.   Abd: Non-distended  Neuro: Alert and oriented. Cranial nerves grossly intact. Normal strength and tone in upper extremities. Weakness in lower extremities, consistent with baseline.   Breast: deferred    IMAGING:  Contrast enhanced mammogram, 2/11/22         ASSESSMENT AND PLAN:   Ms. Marinelli is a 67 year old female with MS and pTispNx DCIS, nuclear grade 2, ER+/WI-, of the left breast, s/p lumpectomy, who presents today to sign consent and complete CT simulation for adjuvant radiation therapy.     As per Dr. Morales's note 5/5/22, Ms. Marinelli is planned for whole breast irradiation per the FAST-FORWARD  protocol with 2600cGy delivered in 5 fractions. Please refer to Dr. Morales's note 5/5/22 for further detail.    The rationale for adjuvant radiation therapy, as well as associated risks and alternatives were again discussed today. Specifically, we discussed the rationale for adjuvant radiation therapy is increased local disease control to minimize risk of recurrence. We discussed alternatives, including adjuvant endocrine therapy and foregoing treatment. We discussed acute risks associated with radiation therapy to the breast, the most common being skin irritation and fatigue. We also discussed chronic risks, including pigmentation changes, scarring/fibrosis in the breast that may lead to breast asymmetry, lymphedema, and damage to surrounding structures including the heart and lungs. We discussed in detail the logistics of CT simulation and radiation treatment. All patient questions were answered. Informed consent was obtained. CT simulation was performed.     Samantha Jennings MS4         I saw and examined the patient with the student  I have reviewed and edited the student's note and agree with the plan of care.        30 minutes were spent on the date of the encounter doing chart review, history and exam, documentation and further activities as noted above.       Vilma Morales MD

## 2022-05-18 ENCOUNTER — OFFICE VISIT (OUTPATIENT)
Dept: RADIATION ONCOLOGY | Facility: CLINIC | Age: 67
End: 2022-05-18
Attending: RADIOLOGY
Payer: MEDICARE

## 2022-05-18 DIAGNOSIS — D05.12 DUCTAL CARCINOMA IN SITU (DCIS) OF LEFT BREAST: Primary | ICD-10-CM

## 2022-05-18 PROCEDURE — 77334 RADIATION TREATMENT AID(S): CPT | Performed by: RADIOLOGY

## 2022-05-18 PROCEDURE — 77334 RADIATION TREATMENT AID(S): CPT | Mod: 26 | Performed by: RADIOLOGY

## 2022-05-18 PROCEDURE — 77290 THER RAD SIMULAJ FIELD CPLX: CPT | Performed by: RADIOLOGY

## 2022-05-18 PROCEDURE — 77290 THER RAD SIMULAJ FIELD CPLX: CPT | Mod: 26 | Performed by: RADIOLOGY

## 2022-05-18 NOTE — LETTER
5/18/2022         RE: Marylee Woods  [address]  [facility] [address]        Dear Colleague,    Thank you for referring your patient, Marylee Woods, to the [facility] RADIATION ONCOLOGY. Please see a copy of my visit note below.    Radiation Therapy Patient Education    Person involved with teaching: Patient    Patient educational needs for self management of treatment-related side effects assessment completed.  EPIC Patient Ed tab contains Patient Learning Assessment    Education Materials Given  Skin Care During Radiation Treatment, sim pamphlet and schdule    Educational Topics Discussed  Side effects expected, Pain management, Skin care, Activity, Nutrition and weight loss and When to call MD/RN    Response To Teaching  Verbalizes understanding    Referrals sent: None    Chemotherapy?  No          Again, thank you for allowing me to participate in the care of your patient.        Sincerely,        Vilma Morales MD

## 2022-05-18 NOTE — LETTER
2022         RE: Marylee Woods  [address]  [facility] [address]        Dear Colleague,    Thank you for referring your patient, Marylee Woods, to the [facility] RADIATION ONCOLOGY. Please see a copy of my visit note below.    Department of Therapeutic Radiology--Radiation Oncology                   [place] Mail [address]  [address] [place]  [address]  Office:  [phone]  Fax:  [phone]   Radiation Oncology Clinic  [address] [place] [address]  Phone:  [phone]  Fax:  [phone]     RE: Marylee Woods : 1955   MRN: [id number] PHILLY: 5/15/2022     OUTPATIENT VISIT NOTE       DIAGNOSIS:  DCIS, nuclear grade 2, of the left breast, s/p lumpectomy  STAGE: Stage 0 pTispNx    HPI: Ms. Marinelli is a 67 year old female with MS and a recent diagnosis of DCIS, nuclear grade 2, of the left breast, now s/p lumpectomy.     Ms. Marinelli was last seen by Dr. Morales on 22.     Briefly, she initially presented after developing diffuse left breast pain in early .  Diagnostic mammogram and targeted US completed 2/3/22 demonstrated a 5 mm mass at the 3 oclock position, 4cm from the nipple. Follow up contrast mammography completed 22 demonstrated enhancement measuring 9 mm at the 3 oclock position, mid-depth. Biopsy of the site completed the same day showed atypical ductal hyperplasia involving a sclerosed papillary lesion, as well as calcifications associated with sclerotic stroma.     She underwent lumpectomy with Dr. Liu on 3/31/22. Surgical pathology demonstrated 7mm nuclear grade 2 DCIS arising from a sclerosed intraductal papilloma, ER+ (95%)/[place]- (<1%). Margins were uninvolved by DCIS. The closest margin was 4 mm (posterior).     She was seen by Dr. Martinez of medical oncology on 22 for discussion of adjuvant endocrine therapy with tamoxifen vs an aromatase inhibitor, with plans for follow up discussion in 3 months.     During her visit with   Andrew on 5/5/22, the natural history of DCIS and role of adjuvant radiation for DCIS were discussed. Specifically, it was discussed that adjuvant radiation reduces the risk of local recurrence, but does not impact survival. It was discussed that adjuvant radiation would be appropriate for Ms. Marinelli given her ongoing reasonable quality of life with her diagnosis of MS. It was determined that Ms. Marinelli would be a candidate for whole breast irradiation according to the FAST-FORWARD protocol with 2600cGy in 5 fractions. Ms. Marinelli elected to proceed with adjuvant radiation.     Ms. Marinelli was diagnosed with MS in 1993. She previously has required disease modified drugs, but has not been on MS targeted medications in the past 3-4 years. At baseline, she has left sided weakness and numbness, incontinence, and is largely wheel chair bound, but can ambulate a few steps with a walker.     INTERVAL HISTORY:   Ms. Marinelli has returned from her vacation and presents today for follow up to sign consent and undergo CT simulation.     On interview today, Ms. Marinelli is doing well. She had a great vacation to the [place] [place]. Overall, she remains at baseline. Denies pain, changes in energy level, changes in appetite or weight loss. She is ready to proceed with radiation.      OBJECTIVE:   Physical exam:   General: Well appearing woman in no acute distress. Sitting in wheel chair  HEENT: Normocephalic and atraumatic. EOMI. Mucous membranes moist.   CV: Regular rate, well perfused  Resp: Breathing comfortably on room air. No audible breath sounds, wheezes, or ronchi.   Abd: Non-distended  Neuro: Alert and oriented. Cranial nerves grossly intact. Normal strength and tone in upper extremities. Weakness in lower extremities, consistent with baseline.   Breast: deferred    IMAGING:  Contrast enhanced mammogram, 2/11/22         ASSESSMENT AND PLAN:   Ms. Marinelli is a 67 year old female with MS and pTispNx DCIS, nuclear  grade 2, ER+/OH-, of the left breast, s/p lumpectomy, who presents today to sign consent and complete CT simulation for adjuvant radiation therapy.     As per Dr. oMrales's note 5/5/22, Ms. Marinelli is planned for whole breast irradiation per the FAST-FORWARD protocol with 2600cGy delivered in 5 fractions. Please refer to Dr. Morales's note 5/5/22 for further detail.    The rationale for adjuvant radiation therapy, as well as associated risks and alternatives were again discussed today. Specifically, we discussed the rationale for adjuvant radiation therapy is increased local disease control to minimize risk of recurrence. We discussed alternatives, including adjuvant endocrine therapy and foregoing treatment. We discussed acute risks associated with radiation therapy to the breast, the most common being skin irritation and fatigue. We also discussed chronic risks, including pigmentation changes, scarring/fibrosis in the breast that may lead to breast asymmetry, lymphedema, and damage to surrounding structures including the heart and lungs. We discussed in detail the logistics of CT simulation and radiation treatment. All patient questions were answered. Informed consent was obtained. CT simulation was performed.     Samantha Jennings MS4         I saw and examined the patient with the student  I have reviewed and edited the student's note and agree with the plan of care.        30 minutes were spent on the date of the encounter doing chart review, history and exam, documentation and further activities as noted above.       Vilma Morales MD

## 2022-05-31 ENCOUNTER — APPOINTMENT (OUTPATIENT)
Dept: RADIATION ONCOLOGY | Facility: CLINIC | Age: 67
End: 2022-05-31
Attending: RADIOLOGY
Payer: MEDICARE

## 2022-05-31 PROCEDURE — 77280 THER RAD SIMULAJ FIELD SMPL: CPT | Performed by: RADIOLOGY

## 2022-05-31 PROCEDURE — 77280 THER RAD SIMULAJ FIELD SMPL: CPT | Mod: 26 | Performed by: RADIOLOGY

## 2022-06-01 ENCOUNTER — APPOINTMENT (OUTPATIENT)
Dept: RADIATION ONCOLOGY | Facility: CLINIC | Age: 67
End: 2022-06-01
Attending: RADIOLOGY
Payer: MEDICARE

## 2022-06-01 PROCEDURE — 77412 RADIATION TX DELIVERY LVL 3: CPT | Performed by: RADIOLOGY

## 2022-06-01 PROCEDURE — 77387 GUIDANCE FOR RADJ TX DLVR: CPT | Performed by: RADIOLOGY

## 2022-06-01 PROCEDURE — G6002 STEREOSCOPIC X-RAY GUIDANCE: HCPCS | Mod: 26 | Performed by: RADIOLOGY

## 2022-06-02 ENCOUNTER — APPOINTMENT (OUTPATIENT)
Dept: RADIATION ONCOLOGY | Facility: CLINIC | Age: 67
End: 2022-06-02
Attending: RADIOLOGY
Payer: MEDICARE

## 2022-06-02 PROCEDURE — 77412 RADIATION TX DELIVERY LVL 3: CPT | Performed by: RADIOLOGY

## 2022-06-02 PROCEDURE — G6002 STEREOSCOPIC X-RAY GUIDANCE: HCPCS | Mod: 26 | Performed by: RADIOLOGY

## 2022-06-02 PROCEDURE — 77387 GUIDANCE FOR RADJ TX DLVR: CPT | Performed by: RADIOLOGY

## 2022-06-03 ENCOUNTER — APPOINTMENT (OUTPATIENT)
Dept: RADIATION ONCOLOGY | Facility: CLINIC | Age: 67
End: 2022-06-03
Attending: RADIOLOGY
Payer: MEDICARE

## 2022-06-03 PROCEDURE — 77412 RADIATION TX DELIVERY LVL 3: CPT | Performed by: RADIOLOGY

## 2022-06-03 PROCEDURE — 77387 GUIDANCE FOR RADJ TX DLVR: CPT | Performed by: RADIOLOGY

## 2022-06-03 PROCEDURE — G6002 STEREOSCOPIC X-RAY GUIDANCE: HCPCS | Mod: 26 | Performed by: RADIOLOGY

## 2022-06-06 ENCOUNTER — APPOINTMENT (OUTPATIENT)
Dept: RADIATION ONCOLOGY | Facility: CLINIC | Age: 67
End: 2022-06-06
Attending: RADIOLOGY
Payer: MEDICARE

## 2022-06-06 VITALS — HEART RATE: 70 BPM | OXYGEN SATURATION: 98 % | SYSTOLIC BLOOD PRESSURE: 129 MMHG | DIASTOLIC BLOOD PRESSURE: 55 MMHG

## 2022-06-06 DIAGNOSIS — D05.12 DUCTAL CARCINOMA IN SITU (DCIS) OF LEFT BREAST: Primary | ICD-10-CM

## 2022-06-06 PROCEDURE — G6002 STEREOSCOPIC X-RAY GUIDANCE: HCPCS | Mod: 26 | Performed by: RADIOLOGY

## 2022-06-06 PROCEDURE — 77412 RADIATION TX DELIVERY LVL 3: CPT | Performed by: RADIOLOGY

## 2022-06-06 PROCEDURE — 77387 GUIDANCE FOR RADJ TX DLVR: CPT | Performed by: RADIOLOGY

## 2022-06-06 NOTE — PATIENT INSTRUCTIONS
Continuing Management of the Effects of Radiation Treatment    The side effects of radiation therapy should gradually decrease in 2 to 3 weeks after you have finished radiation.  Some effects take longer to resolve.    Skin reactions:  Skin changes (such as redness or irritation) should begin to get better gradually.  Some people will have a permanent change in skin color.  Their skin may be more pink or  tan  than the untreated skin.  The skin may be thinner or more fragile than before treatment.  Continue to use a gentle moisturizing lotion for several months.  You should always protect the skin in the area that was treated by using sunscreen of spf 30 or higher.      Other skin care instructions:    Fatigue caused by radiation therapy will decrease and your energy will improve.    For concerns or questions call Department of Therapeutic Radiology [phone]

## 2022-06-06 NOTE — LETTER
Date:June 7, 2022      Provider requested that no letter be sent. Do not send.       [facility]

## 2022-06-06 NOTE — LETTER
2022         RE: Marylee Woods  [address]  [facility] [address]        Dear Colleague,    Thank you for referring your patient, Marylee Woods, to the [facility] RADIATION ONCOLOGY. Please see a copy of my visit note below.    [facility] PHYSICIANS  SPECIALIZING IN BREAKTHROUGHS  Radiation Oncology    On Treatment Visit Note      Marylee Woods      Date: 2022   MRN: [id number]   : 1955  Diagnosis: Breast cancer      Reason for Visit:  On Radiation Treatment Visit     Treatment Summary to Date  Treatment Site: Lt breast Current Dose: 2080/2600 cGy Fractions: 4/5      Chemotherapy  Chemo concurrent with radx?: No    ED Visit/Hosiptal Admission: None    Treatment Breaks: None      Subjective:   Ms. Marinelli has completed 4 out of the 5 planned radiation therapy. She notes swelling of the left breast, especially in the nipple areolar region. She denies pain.     Nursing ROS:   Nutrition Alteration  Diet Type: Patient's Preference  Nutrition Note: appetite good  Skin  Skin Reaction: 1 - Faint erythema or dry desquamation  Skin Note: Sing aquaphor  CNS Alteration  CNS note: Hx of MS              Psychosocial  Psychosocial Note: feeling well, is mindful of her activity r/t her MS  Pain Assessment  0-10 Pain Scale: 0      Objective:   /55   Pulse 70   LMP  (LMP Unknown)   SpO2 98%   Gen: Appears well, in no acute distress  Skin: Very faint erythema around the nipple; mild edema in the breast    Labs:  CBC RESULTS: Recent Labs   Lab Test 21  0727   WBC 9.8   RBC 3.77*   HGB 12.6   HCT 36.6   MCV 97   MCH 33.4*   MCHC 34.4   RDW 15.3*        ELECTROLYTES:  Recent Labs   Lab Test 22  0830 21  1033   NA  --  137   POTASSIUM  --  3.8   CHLORIDE  --  102   JORDON 9.2 9.5   CO2  --  34*   BUN  --  15   CR 1.04 1.08*   GLC  --  80       Assessment:    Tolerating radiation therapy well.  All questions and concerns addressed.    Toxicities:  Fatigue:  Grade 0: No toxicity  Dermatitis: Grade 1: Faint erythema or dry desquamation    Plan:   1. Continue current therapy.    2. Will finish treatment in one more fraction tomorrow.  3. Follow up in one month.    4. Follow up with Dr. Martinez as scheduled.       Mosaiq chart and setup information reviewed  Ports checked    Medication Review  Med Note: No changes per pt    Educational Topic Discussed  Education Instructions: D/C instructions reviewed        Vilma Morales MD/PhD  [phone] clinic  Pager [phone]    Please do not send letter to referring physician.        15 minutes were spent on the date of the encounter doing chart review, history and exam, documentation and further activities as noted above.       Vilma Morales MD      Again, thank you for allowing me to participate in the care of your patient.        Sincerely,        Vilma Morales MD

## 2022-06-07 ENCOUNTER — APPOINTMENT (OUTPATIENT)
Dept: RADIATION ONCOLOGY | Facility: CLINIC | Age: 67
End: 2022-06-07
Attending: RADIOLOGY
Payer: MEDICARE

## 2022-06-07 PROCEDURE — G6002 STEREOSCOPIC X-RAY GUIDANCE: HCPCS | Mod: 26 | Performed by: RADIOLOGY

## 2022-06-07 PROCEDURE — 77336 RADIATION PHYSICS CONSULT: CPT | Performed by: RADIOLOGY

## 2022-06-07 PROCEDURE — 77427 RADIATION TX MANAGEMENT X5: CPT | Performed by: RADIOLOGY

## 2022-06-07 PROCEDURE — 77387 GUIDANCE FOR RADJ TX DLVR: CPT | Performed by: RADIOLOGY

## 2022-06-07 PROCEDURE — 77412 RADIATION TX DELIVERY LVL 3: CPT | Performed by: RADIOLOGY

## 2022-06-13 ENCOUNTER — ONCOLOGY VISIT (OUTPATIENT)
Dept: RADIATION ONCOLOGY | Facility: CLINIC | Age: 67
End: 2022-06-13
Payer: MEDICARE

## 2022-06-13 NOTE — PROCEDURES
Radiotherapy Treatment Summary          Date of Report: 2022     PATIENT: WOODS, MARYLEE  MEDICAL RECORD NO: [id number]  : 1955     DIAGNOSIS: C50.412 Malignant neoplasm of upper-outer quadrant of left female breast  INTENT OF RADIOTHERAPY: Cure  PATHOLOGY:   DCIS, grade 2, ER+/GA-                               STAGE: TisNx  CONCURRENT SYSTEMIC THERAPY:     None               Details of the treatments summarized below are found in records kept in the Department of Radiation Oncology at [facility].     Treatment Summary:  Radiation Oncology - Course: 1 Protocol:   Treatment Site Current Dose Modality From To Elapsed Days Fx.  1 L Breast  2,600 cGy 6X/10X  6/01/2022  2022   6  5                Dose per Fraction:     520 cGy   Total Dose:      2600 cGy        COMMENTS:    Ms. Sharma is a 65 yo female with a  DCIS of the left breast. She presented with left breast   pain.  Work up revealed a small mass in the lateral left breast. Ultrasound guided biopsy revealed atypical ductal   hyperplasia involving a sclerosed papillary lesion, felt to be concordant with radiological finding.      She proceeded with RFID Seed-localized lumpectomy on 3/31/2022. Pathology demonstrated DCIS, nuclear   grade 2, measuring 7 mm in greatest dimension, arising in a sclerosed intraductal papilloma. Closest margin was   4 mm posteriorly. DCIS was ER 95% strongly positive, GA negative. She met with Dr. Martinez, who dicussed   adjuvant endocrine therapy with Tamoxifen but emphasized the importance of maintaining her quality of life.      Her medical history is significant for Multiple Sclerosis, but it is under good control and she enjoys a good   quality of life. As such, A course of adjuvant radiation therapy was recommended for local control. She was   treated with the FAST-FORWARD regimen of 5 fractions. She tolerated the treatment well with only faint   erythema.                       ED visits/hospitalizations: None      Missed treatments: None     Acute Toxicity Profile by CTC v5.0:  Fatigue: Grade 0: No toxicity  Dermatitis: Grade 1: Faint erythema or dry desquamation     PAIN MANAGEMENT:     Not required                         FOLLOW UP PLAN:   Follow up in 1 month in our clinic.  Follow up with Dr. Martinez as scheduled.                            Staff Physician: Vilma Morales M.D.  Physicist: Ray Fink , PhD     MD Kenny Berger MD                                        Radiation Oncology:  [facility], [address] [place] [id number]

## 2022-06-27 DIAGNOSIS — N39.0 FREQUENT UTI: ICD-10-CM

## 2022-06-29 RX ORDER — NITROFURANTOIN 25; 75 MG/1; MG/1
CAPSULE ORAL
Qty: 90 CAPSULE | Refills: 0 | Status: SHIPPED | OUTPATIENT
Start: 2022-06-29 | End: 2022-09-26

## 2022-06-29 NOTE — TELEPHONE ENCOUNTER
Routing refill request to provider for review/approval because:  --Drug not on the [facility] refill protocol nitroFURantoin macrocrystal-monohydrate (MACROBID) 100 MG capsule.  --Appears to be a chronic medication.    --Last visit:  1/24/2022 Saeed for urinary system+9.    --Future Visit: none with FP.

## 2022-07-07 ENCOUNTER — OFFICE VISIT (OUTPATIENT)
Dept: RADIATION ONCOLOGY | Facility: CLINIC | Age: 67
End: 2022-07-07
Attending: RADIOLOGY
Payer: MEDICARE

## 2022-07-07 VITALS — DIASTOLIC BLOOD PRESSURE: 61 MMHG | SYSTOLIC BLOOD PRESSURE: 118 MMHG | HEART RATE: 77 BPM | OXYGEN SATURATION: 100 %

## 2022-07-07 DIAGNOSIS — D05.12 DUCTAL CARCINOMA IN SITU (DCIS) OF LEFT BREAST: Primary | ICD-10-CM

## 2022-07-07 PROCEDURE — G0463 HOSPITAL OUTPT CLINIC VISIT: HCPCS

## 2022-07-07 NOTE — PROGRESS NOTES
Department of Therapeutic Radiology--Radiation Oncology                   [place] [address]  [address] [place]  [address]  Office:  [phone]  Fax:  [phone]   Radiation Oncology Clinic  [address] [place] [address]  Phone:  [phone]  Fax:  [phone]     RE: Marylee Woods : 1955   MRN: [id number] PHILLY: 2022     OUTPATIENT VISIT NOTE       DIAGNOSIS: Left breast DCIS, grade 2, 7 mm, ER+/NH-    AREAS TREATED: Left breast    DOSE:  2600 cGy in 5 fractions    TYPES OF RADIATION GIVEN: 3D conformal radiotherapy        INTERVAL SINCE COMPLETION OF RADIATION THERAPY: ~ 1 month (she completed the treatment on 2022)    SUBJECTIVE: Ms. Marinelli is a 67 yo female with a  DCIS of the left breast. She presented with left breast pain.  Work up revealed a small mass in the lateral left breast. Ultrasound guided biopsy revealed atypical ductal hyperplasia involving a sclerosed papillary lesion, felt to be concordant with radiological finding.      She proceeded with RFID Seed-localized lumpectomy on 3/31/2022. Pathology demonstrated DCIS, nuclear grade 2, measuring 7 mm in greatest dimension, arising in a sclerosed intraductal papilloma. Closest margin was 4 mm posteriorly. DCIS was ER 95% strongly positive, NH negative. She met with Dr. Martinez, who dicussed adjuvant endocrine therapy with Tamoxifen but emphasized the importance of maintaining her quality of life.      Her medical history is significant for Multiple Sclerosis, but it is under good control and she enjoys a good  quality of life. As such, A course of adjuvant radiation therapy was recommended for local control. She was treated with the FAST-FORWARD regimen of 5 fractions. She tolerated the treatment well with only faint erythema.     Ms. Marinelli is here today for a routine 1 month follow up visit. On interview, she states that she didn't have side effects until recently in the bath when she noticed skin  peeling. There was peeling around the nipple, in the inframammary fold and in the upper breast. She has tenderness around the surgical site and feels that her left breast is larger than the right. She has fatigue, but this could be weather related and/or MS-related. She met with Dr. Martinez and has decided to hold off endocrine therapy for now until they meet again in August. She and her  are planning a road trip to [place] and several other [facility] in October.     OBJECTIVE:      LMP  (LMP Unknown)    /61   Pulse 77   LMP  (LMP Unknown)   SpO2 100%    Gen: appears well  Skin: Evidence of recent desquamation in the inframammary fold, NAC and upper breast. No weeping. Mild hyperpigmentation and erythema. No significant seroma beneath the scar.     ASSESSMENT AND PLAN: In summary, Ms. Marinelli is a 68 yo female with grade 2 DCIS of the left breast, s/p lumpectomy, 1 month s/p adjuvant radiation therapy. She did develop desquamation after treatment completion, which is healing now. The asymmetry in size could be a combination of edema and residual seroma.     Ms. Marinelli has no concerns with residual skin reaction. She is reassured that the discoloration will improve over time. Her tenderness will also subside as the inflammation resolves.     Ms. Marinelli will follow up with Dr. Martinez, who will arrange her future mammograms. I will have my nurse Ms. Ekta Ochoa give her a call in 6 months to follow up on her symptoms/cosmesis. She can be seen in our clinic on as needed basis.        Vilma Morales M.D./Ph.D.  Radiation Oncologist   Department of Therapeutic Radiology   [facility]  Phone: [phone]         30 minutes were spent on the date of the encounter doing chart review, history and exam, documentation and further activities as noted above.       Vilma Morales MD

## 2022-07-07 NOTE — LETTER
2022         RE: Marylee Woods  [address]  [facility] [address]        Dear Colleague,    Thank you for referring your patient, Marylee Woods, to the [facility] RADIATION ONCOLOGY. Please see a copy of my visit note below.    Department of Therapeutic Radiology--Radiation Oncology                   [place] [address]  [address] [place]  [address]  Office:  [phone]  Fax:  [phone]   Radiation Oncology Clinic  [address] [place] [address]  Phone:  [phone]  Fax:  [phone]     RE: Marylee Woods : 1955   MRN: [id number] PHILLY: 2022     OUTPATIENT VISIT NOTE       DIAGNOSIS: Left breast DCIS, grade 2, 7 mm, ER+/OR-    AREAS TREATED: Left breast    DOSE:  2600 cGy in 5 fractions    TYPES OF RADIATION GIVEN: 3D conformal radiotherapy        INTERVAL SINCE COMPLETION OF RADIATION THERAPY: ~ 1 month (she completed the treatment on 2022)    SUBJECTIVE: Ms. Marinelli is a 65 yo female with a  DCIS of the left breast. She presented with left breast pain.  Work up revealed a small mass in the lateral left breast. Ultrasound guided biopsy revealed atypical ductal hyperplasia involving a sclerosed papillary lesion, felt to be concordant with radiological finding.      She proceeded with RFID Seed-localized lumpectomy on 3/31/2022. Pathology demonstrated DCIS, nuclear grade 2, measuring 7 mm in greatest dimension, arising in a sclerosed intraductal papilloma. Closest margin was 4 mm posteriorly. DCIS was ER 95% strongly positive, OR negative. She met with Dr. Martinez, who dicussed adjuvant endocrine therapy with Tamoxifen but emphasized the importance of maintaining her quality of life.      Her medical history is significant for Multiple Sclerosis, but it is under good control and she enjoys a good  quality of life. As such, A course of adjuvant radiation therapy was recommended for local control. She was treated with the FAST-FORWARD regimen  of 5 fractions. She tolerated the treatment well with only faint erythema.     Ms. Marinelli is here today for a routine 1 month follow up visit. On interview, she states that she didn't have side effects until recently in the bath when she noticed skin peeling. There was peeling around the nipple, in the inframammary fold and in the upper breast. She has tenderness around the surgical site and feels that her left breast is larger than the right. She has fatigue, but this could be weather related and/or MS-related. She met with Dr. Martinez and has decided to hold off endocrine therapy for now until they meet again in August. She and her  are planning a road trip to [place] and several other [facility] in October.     OBJECTIVE:      LMP  (LMP Unknown)    /61   Pulse 77   LMP  (LMP Unknown)   SpO2 100%    Gen: appears well  Skin: Evidence of recent desquamation in the inframammary fold, NAC and upper breast. No weeping. Mild hyperpigmentation and erythema. No significant seroma beneath the scar.     ASSESSMENT AND PLAN: In summary, Ms. Marinelli is a 66 yo female with grade 2 DCIS of the left breast, s/p lumpectomy, 1 month s/p adjuvant radiation therapy. She did develop desquamation after treatment completion, which is healing now. The asymmetry in size could be a combination of edema and residual seroma.     Ms. Marinelli has no concerns with residual skin reaction. She is reassured that the discoloration will improve over time. Her tenderness will also subside as the inflammation resolves.     Ms. Marinelli will follow up with Dr. Martinez, who will arrange her future mammograms. I will have my nurse Ms. Dash Leidamelissa give her a call in 6 months to follow up on her symptoms/cosmesis. She can be seen in our clinic on as needed basis.        Vilma Morales M.D./Ph.D.  Radiation Oncologist   Department of Therapeutic Radiology   [facility]  Phone: [phone]         30 minutes were spent on the date  of the encounter doing chart review, history and exam, documentation and further activities as noted above.       Vilma Morales MD    FOLLOW-UP VISIT    Patient Name: Marylee Woods      : 1955     Age: 67 year old        ______________________________________________________________________________     Chief Complaint   Patient presents with     Breast Cancer     Radiation follow up     /61   Pulse 77   LMP  (LMP Unknown)   SpO2 100%      Date Radiation Completed: Lt Breast 2600 cGy completed 22    Pain  Incisional discomfort as well as some pealing under the breast.    Labs  Other Labs: No    Imaging  None    Other Appointments: No    Residual Radiation side effect: Skin continues to heal. Continues to feel tired.     Additional Instructions: Seeing Dr. Martinez before starting Tamoxifen.    Nurse face-to-face time: Level 3:  10 min face to face time            Again, thank you for allowing me to participate in the care of your patient.        Sincerely,        Vilma Morales MD

## 2022-07-07 NOTE — PROGRESS NOTES
FOLLOW-UP VISIT    Patient Name: Marylee Woods      : 1955     Age: 67 year old        ______________________________________________________________________________     Chief Complaint   Patient presents with     Breast Cancer     Radiation follow up     /61   Pulse 77   LMP  (LMP Unknown)   SpO2 100%      Date Radiation Completed: Lt Breast 2600 cGy completed 22    Pain  Incisional discomfort as well as some pealing under the breast.    Labs  Other Labs: No    Imaging  None    Other Appointments: No    Residual Radiation side effect: Skin continues to heal. Continues to feel tired.     Additional Instructions: Seeing Dr. Martinez before starting Tamoxifen.    Nurse face-to-face time: Level 3:  10 min face to face time

## 2022-07-08 DIAGNOSIS — N39.0 FREQUENT UTI: ICD-10-CM

## 2022-07-11 RX ORDER — NITROFURANTOIN 25; 75 MG/1; MG/1
CAPSULE ORAL
Qty: 0.1 CAPSULE | Refills: 0 | OUTPATIENT
Start: 2022-07-11

## 2022-07-11 NOTE — TELEPHONE ENCOUNTER
Duplicate see script sent 6/29/2022 nitroFURantoin macrocrystal-monohydrate (MACROBID) 100 MG capsule 90 capsule  6/29/2022

## 2022-08-09 NOTE — PROGRESS NOTES
[facility] Medical Oncology Note       Date of visit: August 11, 2022  New Outpatient Clinic Note        Assessment:     1. Subcm DCIS status postlumpectomy, in a 66-year-old woman who is on medical disability because of progressive multiple sclerosis.  She is in a wheelchair at baseline.  She has incontinence.  She can ambulate 20-25 steps with the assistance of a walker.  But we have to be careful about any therapy worsening her overall quality of life.  2. We treat ductal carcinoma in situ to prevent the development of invasive disease.  Adjuvant radiotherapy is standard in this setting.  She  Received this with only adequate tolerance. In fact, she still has significant fatigue a few months after completing the radiotherapy.  This colors her approach to any other therapies such as endocrine treatment.  3. In terms of systemic therapy, both tamoxifen and aromatase inhibitors have been studied in this setting and have never been shown to affect survival.  They can decrease the relative risk of the development of invasive cancer, but the absolute benefit is in the low to mid single digits.  There is equivalence of 5 mg of tamoxifen for 3 years as compared to 20 mg for 5 years.  But clearly there is no impact on survival.  Henrik wants us to focus on her quality of life.  Therefore in my opinion, the benefit of adjuvant endocrine treatment in this setting is far outweighed by any chance of worsening her tenuous quality of life right now.  4. I had a long and involved conversation with Malorie Almendarez and her  Manuel during her virtual visit today.  Many questions were asked and hopefully answered to her satisfaction.        Plan:     1. No intervention by me  2. Return to clinic in 1 year for routine follow-up.    Kenny Martinez MD, MSc  Associate Professor of Medicine  [employer]  [facility]  [address] [place] [address]  [id number]  [phone]    __________________________________________________________________    CHIEF COMPLAINT     1. Sub centimeter grade 2 DCIS, diagnosed at left breast lumpectomy 3/31/2022. Final pathology showed a 7 mm area of grade 2 ductal carcinoma in situ.  There was no evidence of invasion in the specimen.  Margins were uninvolved with the closest margin being 4 mm away.  The tumor was estrogen receptor positive.  2. Multiple Sclerosis, for which she is severely disabled. She spends most of the day in a wheelchair.  She has urinary incontinence.  She does do physical therapy once a week.  With assistance from her  Manuel, she can ambulate about 20 feet with a walker.  She then gets tired.  She has issues with spasmatic muscles and is taking antispasmodics.  She has not had any specific drugs for her multiple sclerosis since 2017.  Things have been pretty stable.      History of Present Illness/THERAPY TO DATE:     1. Left lumpectomy 3/31/2022.  2. Adjuvant RT from 6/1-6/5 2600 cGy in 5 fractions  3. Marylee is back to discuss where we go from here regarding therapy.        Interval History:       Tolerated RT poorly.  She had significant fatigue for which she is still recovering.    She still is having some cognitive issues.    She remains wheelchair-bound.  Is difficult for her to ambulate.  She still has chronic urinary incontinence.    She is not overly enthusiastic about receiving endocrine therapy.  She understands that this will have no impact on her overall survival.    She would like to continue to be seen on a yearly basis.        Past Medical History:   I have reviewed this patient's past medical history   Past Medical History:   Diagnosis Date     Atypical ductal hyperplasia of left breast 02/2022     Depression      Gastro-oesophageal reflux disease      Graves disease      Multiple sclerosis (H)      Osteoporosis      Postablative hypothyroidism           Past Surgical History:    I have  reviewed this patient's past surgical history       Social History:   Tobacco, ETOH, and rec drugs reviewed and as noted below with the following exceptions:  Malorie Almendarez grew up in [place] and graduated from [employer] in 1973.  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  After that she was in the  of another [employer] and she really started thinking that she wanted to do something else with her life.  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  She really liked the work.  She ultimately had to retire because of progression of her multiple sclerosis.  Her  is Manuel whom she met outside of the [employer]  [place] were [place].  They  in 1985.  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  They have 3 children, Marleni, Joss, and Janeth with one 1-year-old grandson named Sami who lives in [place].  Her bucket list included seeing the [place] (done) going to [employer] (done).  She is planning next week to go to [employer] and spent a lot of time in the [employer] themed areas since she is a big fan of those books.  She is a [employer]          Family History:     Family History   Problem Relation Age of Onset     Heart Disease Maternal Grandmother      Cancer Maternal Grandfather      Heart Disease Paternal Grandfather      Heart Disease Mother      Heart Disease Father      Diabetes No family hx of      Coronary Artery Disease No family hx of      Hypertension No family hx of      Hyperlipidemia No family hx of      Cerebrovascular Disease No family hx of      Breast Cancer No family hx of      Colon Cancer No family hx of      Prostate Cancer No family hx of      Other Cancer No family hx of      Depression No family hx of      Anxiety Disorder No family hx of      Mental Illness No family hx of      Substance Abuse No family hx of       Anesthesia Reaction No family hx of      Asthma No family hx of      Osteoporosis No family hx of      Genetic Disorder No family hx of      Thyroid Disease No family hx of      Obesity No family hx of      Unknown/Adopted No family hx of             Medications:     Current Outpatient Medications   Medication Sig Dispense Refill     acetaminophen (TYLENOL) 500 MG tablet Take 1-2 tablets (500-1,000 mg) by mouth every 8 hours as needed for mild pain or fever (greater than 101 degrees)       baclofen (LIORESAL) 10 MG tablet 2 times daily Take 2 tablets (20 MG) by mouth every morning, 1 tablet (10 MG) by mouth daily in the afternoon as needed, and 3 tablets (30 MG) by mouth every evening before bedtime.       calcium carbonate 500 mg, elemental, (OSCAL 500) 1250 (500 Ca) MG TABS tablet Take by mouth every evening       DULoxetine (CYMBALTA) 20 MG capsule Take 1 capsule (20 mg) by mouth daily Take along with 60 mg for a total of 80 mg (Patient taking differently: Take 20 mg by mouth every morning Take along with 60 mg for a total of 80 mg) 90 capsule 3     DULoxetine (CYMBALTA) 60 MG capsule TAKE 1 CAPSULE BY MOUTH EVERY DAY (Patient taking differently: Take 60 mg by mouth every morning TAKE 1 CAPSULE BY MOUTH EVERY DAY) 90 capsule 3     famotidine (PEPCID) 40 MG tablet TAKE 1 TABLET BY MOUTH TWICE A DAY (Patient taking differently: Take 40 mg by mouth At Bedtime) 180 tablet 1     gabapentin (NEURONTIN) 300 MG capsule Take 300 mg by mouth 2 times daily Take 2 tablets (600 MG) by mouth 3-4 times daily.       hydrochlorothiazide (HYDRODIURIL) 12.5 MG tablet Take 1 tablet (12.5 mg) by mouth daily (Patient taking differently: Take 12.5 mg by mouth every morning) 90 tablet 3     latanoprost (XALATAN) 0.005 % ophthalmic solution INSTILL 1 DROP INTO BOTH EYES EVERY DAY AS DIRECTED PT WILL NEED TO BE SEEN FOR FUTURE REFILLS       levothyroxine (SYNTHROID/LEVOTHROID) 75 MCG tablet Take 1 tablet (75 mcg) by mouth daily (Patient  taking differently: Take 75 mcg by mouth every morning) 90 tablet 3     multivitamin w/minerals (THERA-VIT-M) tablet Take 1 tablet by mouth every evening       nitroFURantoin macrocrystal-monohydrate (MACROBID) 100 MG capsule Take 1 capsule by mouth once daily 90 capsule 0     oxybutynin (DITROPAN-XL) 10 MG 24 hr tablet Take 1 tablet (10 mg) by mouth 2 times daily 30 tablet      polyethylene glycol (MIRALAX) 17 GM/Dose powder Take 17 g by mouth daily (Patient taking differently: Take 17 g by mouth every morning) 510 g 0     valACYclovir (VALTREX) 500 MG tablet Take 1 tablet (500 mg) by mouth 2 times daily 12 tablet 5              Physical Exam:   not currently breastfeeding.  Minimal exam was done today due to the consultative nature of our appointment.    Constitutional: WDWN female in NAD, pleasant and appropriate  Neurologic: alert, answering questions appropriately,   Psych: appropriate affect  Data:     Recent Labs   Lab Test 07/02/21  0727 07/01/21  0713 06/30/21  1717 06/10/21  0904 07/03/20  1525 11/27/18  0933 11/15/18  0711 11/12/18  0622   WBC 9.8 10.4 8.1 5.7 3.6* 3.2*  --  3.2*   NEUTROPHIL 83.8 84.8 77.5  --   --  78.0  --  76.7   HGB 12.6 12.2 14.7 12.2 9.2* 9.6*  --  7.4*    149* 194 177 181 179   < > 204    < > = values in this interval not displayed.     Recent Labs   Lab Test 02/24/22  0830 08/12/21  1033 07/09/21  1538 07/02/21  0727 07/01/21  0713 06/30/21  1717   NA  --  137 136 139 139 136   POTASSIUM  --  3.8 4.5 3.9  3.9 4.4 2.9*   CHLORIDE  --  102 102 104 109 98   CO2  --  34* 34* 25 23 32   ANIONGAP  --  1* <1* 9 7 6   BUN  --  15 9 25 12 13   CR 1.04 1.08* 0.98 0.96 0.88 0.97   JORDON 9.2 9.5 9.5 8.8 8.3* 9.6     Recent Labs   Lab Test 08/12/21  1033 07/01/21  0713 06/30/21  1717 11/02/18  0521 10/03/17  1449   MAG  --  1.8 1.9 1.8  --    PHOS 3.4  --  3.8  --   --    LDH  --   --   --   --  181     Recent Labs   Lab Test 08/12/21  1033 07/02/21  0727 07/01/21  0713 06/30/21  1717  11/01/17  0047 10/03/17  1449   BILITOTAL  --  0.7 0.8 0.7   < > 0.3   ALKPHOS  --  64 58 72   < > 78   ALT  --  19 16 21   < > 21   AST  --  30 33 26   < > 23   ALBUMIN 3.6 3.4 3.0* 4.0   < > 3.9   LDH  --   --   --   --   --  181    < > = values in this interval not displayed.       No results found for this or any previous visit (from the past 24 hour(s)).    Other Data     LEFT breast, 3:00, RFID seed localized lumpectomy:  -Ductal carcinoma in situ (DCIS), nuclear grade 2, size 7 mm, arising in a sclerosed intraductal papilloma  -No invasive carcinoma identified  -Margins are uninvolved by DCIS  -DCIS is 4 mm from the closest (posterior) margin, 5 mm from the lateral margin, and is > 5 mm from the  anterior, superior, inferior, and medial margins  -Other findings: fibrocystic change (including microcysts) and usual ductal hyperplasia  -Calcifications associated with sclerotic stroma and with benign ducts and acini  -Prior core biopsy site change  -DCIS is estrogen receptor positive and progesterone receptor negative by immunohistochemistry (see  biomarker reporting template below)  -See tumor synoptic below  Electronically signed by Juana Palomares MD on 4/7/2022 at 10:45 PM  .  Synoptic Checklist  DCIS OF THE BREAST: Resection (DCIS OF THE BREAST: COMPLETE EXCISION - All Specimens) 8th Edition -  Protocol posted: 2/26/2020  SPECIMEN  Procedure Excision (less than total mastectomy)  Specimen Laterality Left  .      Labs, imaging and treatment plan reviewed with patient. All questions answered.        25 minutes spent on the date of the encounter doing chart review, review of outside records, review of test results, interpretation of tests, patient visit, documentation and discussion with family

## 2022-08-11 ENCOUNTER — ONCOLOGY VISIT (OUTPATIENT)
Dept: ONCOLOGY | Facility: CLINIC | Age: 67
End: 2022-08-11
Attending: INTERNAL MEDICINE
Payer: MEDICARE

## 2022-08-11 VITALS
DIASTOLIC BLOOD PRESSURE: 67 MMHG | HEART RATE: 64 BPM | SYSTOLIC BLOOD PRESSURE: 121 MMHG | RESPIRATION RATE: 16 BRPM | TEMPERATURE: 98.7 F

## 2022-08-11 DIAGNOSIS — D05.12 DUCTAL CARCINOMA IN SITU (DCIS) OF LEFT BREAST: Primary | ICD-10-CM

## 2022-08-11 PROCEDURE — 99213 OFFICE O/P EST LOW 20 MIN: CPT | Performed by: INTERNAL MEDICINE

## 2022-08-11 PROCEDURE — G0463 HOSPITAL OUTPT CLINIC VISIT: HCPCS

## 2022-08-11 ASSESSMENT — PAIN SCALES - GENERAL: PAINLEVEL: NO PAIN (0)

## 2022-08-11 NOTE — NURSING NOTE
"Oncology Rooming Note    August 11, 2022 12:49 PM   Marylee Woods is a 67 year old female who presents for:    Chief Complaint   Patient presents with     Oncology Clinic Visit     Pt is here for a rt for Breast Cancer     Initial Vitals: Blood Pressure 121/67   Pulse 64   Temperature 98.7  F (37.1  C) (Oral)   Respiration 16   Last Menstrual Period  (LMP Unknown)  Estimated body mass index is 19.34 kg/m  as calculated from the following:    Height as of 3/31/22: 1.746 m (5' 8.74\").    Weight as of 3/31/22: 59 kg (130 lb). There is no height or weight on file to calculate BSA.  No Pain (0) Comment: Data Unavailable   No LMP recorded (lmp unknown). Patient is postmenopausal.  Allergies reviewed: Yes  Medications reviewed: Yes    Medications: Medication refills not needed today.  Pharmacy name entered into [employer]:    [id number] [place], [place] - [address] [facility] [place], [place] - [address] [place] [address] PHARMAC[address] [place] [address] - [address] [place] [address] PHARMACY [place], [place] - [address] [facility] [phone]    Clinical concerns: none       Kelli Anderson MA            "

## 2022-08-11 NOTE — LETTER
8/11/2022         RE: Marylee Woods  [address]  [facility] [address]        Dear Colleague,    Thank you for referring your patient, Marylee Woods, to the [facility]. Please see a copy of my visit note below.              [facility] Medical Oncology Note       Date of visit: August 11, 2022  New Outpatient Clinic Note        Assessment:     1. Subcm DCIS status postlumpectomy, in a 66-year-old woman who is on medical disability because of progressive multiple sclerosis.  She is in a wheelchair at baseline.  She has incontinence.  She can ambulate 20-25 steps with the assistance of a walker.  But we have to be careful about any therapy worsening her overall quality of life.  2. We treat ductal carcinoma in situ to prevent the development of invasive disease.  Adjuvant radiotherapy is standard in this setting.  She  Received this with only adequate tolerance. In fact, she still has significant fatigue a few months after completing the radiotherapy.  This colors her approach to any other therapies such as endocrine treatment.  3. In terms of systemic therapy, both tamoxifen and aromatase inhibitors have been studied in this setting and have never been shown to affect survival.  They can decrease the relative risk of the development of invasive cancer, but the absolute benefit is in the low to mid single digits.  There is equivalence of 5 mg of tamoxifen for 3 years as compared to 20 mg for 5 years.  But clearly there is no impact on survival.  Henrik wants us to focus on her quality of life.  Therefore in my opinion, the benefit of adjuvant endocrine treatment in this setting is far outweighed by any chance of worsening her tenuous quality of life right now.  4. I had a long and involved conversation with Malorie Almendarez and her  Manuel during her virtual visit today.  Many questions were asked and hopefully answered to her satisfaction.        Plan:     1. No  intervention by me  2. Return to clinic in 1 year for routine follow-up.    Kenny Martinez MD, MSc  Associate Professor of Medicine  [employer]  [facility]  [address] [place] [id number]  [phone]    __________________________________________________________________    CHIEF COMPLAINT     1. Sub centimeter grade 2 DCIS, diagnosed at left breast lumpectomy 3/31/2022. Final pathology showed a 7 mm area of grade 2 ductal carcinoma in situ.  There was no evidence of invasion in the specimen.  Margins were uninvolved with the closest margin being 4 mm away.  The tumor was estrogen receptor positive.  2. Multiple Sclerosis, for which she is severely disabled. She spends most of the day in a wheelchair.  She has urinary incontinence.  She does do physical therapy once a week.  With assistance from her  Manuel, she can ambulate about 20 feet with a walker.  She then gets tired.  She has issues with spasmatic muscles and is taking antispasmodics.  She has not had any specific drugs for her multiple sclerosis since 2017.  Things have been pretty stable.      History of Present Illness/THERAPY TO DATE:     1. Left lumpectomy 3/31/2022.  2. Adjuvant RT from 6/1-6/5 2600 cGy in 5 fractions  3. Marylee is back to discuss where we go from here regarding therapy.        Interval History:       Tolerated RT poorly.  She had significant fatigue for which she is still recovering.    She still is having some cognitive issues.    She remains wheelchair-bound.  Is difficult for her to ambulate.  She still has chronic urinary incontinence.    She is not overly enthusiastic about receiving endocrine therapy.  She understands that this will have no impact on her overall survival.    She would like to continue to be seen on a yearly basis.        Past Medical History:   I have reviewed this patient's past medical history   Past Medical History:   Diagnosis Date     Atypical  ductal hyperplasia of left breast 02/2022     Depression      Gastro-oesophageal reflux disease      Graves disease      Multiple sclerosis (H)      Osteoporosis      Postablative hypothyroidism           Past Surgical History:    I have reviewed this patient's past surgical history       Social History:   Tobacco, ETOH, and rec drugs reviewed and as noted below with the following exceptions:  Malorie Almendarez grew up in [place] and graduated from [employer] in 1973.  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  After that she was in the  of another [employer] and she really started thinking that she wanted to do something else with her life.  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  She really liked the work.  She ultimately had to retire because of progression of her multiple sclerosis.  Her  is Manuel whom she met outside of the [employer] [facility].  [place] were fans of [place].  They  in 1985.  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  They have 3 children, Marleni, Joss, and Janeth with one 1-year-old grandson named Sami who lives in [place].  Her bucket list included seeing the [place] (done) going to [employer] (done).  She is planning next week to go to [employer] and spent a lot of time in the [employer] themed areas since she is a big fan of those books.  She is a [employer]          Family History:     Family History   Problem Relation Age of Onset     Heart Disease Maternal Grandmother      Cancer Maternal Grandfather      Heart Disease Paternal Grandfather      Heart Disease Mother      Heart Disease Father      Diabetes No family hx of      Coronary Artery Disease No family hx of      Hypertension No family hx of      Hyperlipidemia No family hx of      Cerebrovascular Disease No family hx of      Breast Cancer No family hx of      Colon Cancer No  family hx of      Prostate Cancer No family hx of      Other Cancer No family hx of      Depression No family hx of      Anxiety Disorder No family hx of      Mental Illness No family hx of      Substance Abuse No family hx of      Anesthesia Reaction No family hx of      Asthma No family hx of      Osteoporosis No family hx of      Genetic Disorder No family hx of      Thyroid Disease No family hx of      Obesity No family hx of      Unknown/Adopted No family hx of             Medications:     Current Outpatient Medications   Medication Sig Dispense Refill     acetaminophen (TYLENOL) 500 MG tablet Take 1-2 tablets (500-1,000 mg) by mouth every 8 hours as needed for mild pain or fever (greater than 101 degrees)       baclofen (LIORESAL) 10 MG tablet 2 times daily Take 2 tablets (20 MG) by mouth every morning, 1 tablet (10 MG) by mouth daily in the afternoon as needed, and 3 tablets (30 MG) by mouth every evening before bedtime.       calcium carbonate 500 mg, elemental, (OSCAL 500) 1250 (500 Ca) MG TABS tablet Take by mouth every evening       DULoxetine (CYMBALTA) 20 MG capsule Take 1 capsule (20 mg) by mouth daily Take along with 60 mg for a total of 80 mg (Patient taking differently: Take 20 mg by mouth every morning Take along with 60 mg for a total of 80 mg) 90 capsule 3     DULoxetine (CYMBALTA) 60 MG capsule TAKE 1 CAPSULE BY MOUTH EVERY DAY (Patient taking differently: Take 60 mg by mouth every morning TAKE 1 CAPSULE BY MOUTH EVERY DAY) 90 capsule 3     famotidine (PEPCID) 40 MG tablet TAKE 1 TABLET BY MOUTH TWICE A DAY (Patient taking differently: Take 40 mg by mouth At Bedtime) 180 tablet 1     gabapentin (NEURONTIN) 300 MG capsule Take 300 mg by mouth 2 times daily Take 2 tablets (600 MG) by mouth 3-4 times daily.       hydrochlorothiazide (HYDRODIURIL) 12.5 MG tablet Take 1 tablet (12.5 mg) by mouth daily (Patient taking differently: Take 12.5 mg by mouth every morning) 90 tablet 3     latanoprost  (XALATAN) 0.005 % ophthalmic solution INSTILL 1 DROP INTO BOTH EYES EVERY DAY AS DIRECTED PT WILL NEED TO BE SEEN FOR FUTURE REFILLS       levothyroxine (SYNTHROID/LEVOTHROID) 75 MCG tablet Take 1 tablet (75 mcg) by mouth daily (Patient taking differently: Take 75 mcg by mouth every morning) 90 tablet 3     multivitamin w/minerals (THERA-VIT-M) tablet Take 1 tablet by mouth every evening       nitroFURantoin macrocrystal-monohydrate (MACROBID) 100 MG capsule Take 1 capsule by mouth once daily 90 capsule 0     oxybutynin (DITROPAN-XL) 10 MG 24 hr tablet Take 1 tablet (10 mg) by mouth 2 times daily 30 tablet      polyethylene glycol (MIRALAX) 17 GM/Dose powder Take 17 g by mouth daily (Patient taking differently: Take 17 g by mouth every morning) 510 g 0     valACYclovir (VALTREX) 500 MG tablet Take 1 tablet (500 mg) by mouth 2 times daily 12 tablet 5              Physical Exam:   not currently breastfeeding.  Minimal exam was done today due to the consultative nature of our appointment.    Constitutional: WDWN female in NAD, pleasant and appropriate  Neurologic: alert, answering questions appropriately,   Psych: appropriate affect  Data:     Recent Labs   Lab Test 07/02/21  0727 07/01/21  0713 06/30/21  1717 06/10/21  0904 07/03/20  1525 11/27/18  0933 11/15/18  0711 11/12/18  0622   WBC 9.8 10.4 8.1 5.7 3.6* 3.2*  --  3.2*   NEUTROPHIL 83.8 84.8 77.5  --   --  78.0  --  76.7   HGB 12.6 12.2 14.7 12.2 9.2* 9.6*  --  7.4*    149* 194 177 181 179   < > 204    < > = values in this interval not displayed.     Recent Labs   Lab Test 02/24/22  0830 08/12/21  1033 07/09/21  1538 07/02/21  0727 07/01/21  0713 06/30/21  1717   NA  --  137 136 139 139 136   POTASSIUM  --  3.8 4.5 3.9  3.9 4.4 2.9*   CHLORIDE  --  102 102 104 109 98   CO2  --  34* 34* 25 23 32   ANIONGAP  --  1* <1* 9 7 6   BUN  --  15 9 25 12 13   CR 1.04 1.08* 0.98 0.96 0.88 0.97   JORDON 9.2 9.5 9.5 8.8 8.3* 9.6     Recent Labs   Lab Test 08/12/21  1033  07/01/21  0713 06/30/21  1717 11/02/18  0521 10/03/17  1449   MAG  --  1.8 1.9 1.8  --    PHOS 3.4  --  3.8  --   --    LDH  --   --   --   --  181     Recent Labs   Lab Test 08/12/21  1033 07/02/21  0727 07/01/21  0713 06/30/21  1717 11/01/17  0047 10/03/17  1449   BILITOTAL  --  0.7 0.8 0.7   < > 0.3   ALKPHOS  --  64 58 72   < > 78   ALT  --  19 16 21   < > 21   AST  --  30 33 26   < > 23   ALBUMIN 3.6 3.4 3.0* 4.0   < > 3.9   LDH  --   --   --   --   --  181    < > = values in this interval not displayed.       No results found for this or any previous visit (from the past 24 hour(s)).    Other Data     LEFT breast, 3:00, RFID seed localized lumpectomy:  -Ductal carcinoma in situ (DCIS), nuclear grade 2, size 7 mm, arising in a sclerosed intraductal papilloma  -No invasive carcinoma identified  -Margins are uninvolved by DCIS  -DCIS is 4 mm from the closest (posterior) margin, 5 mm from the lateral margin, and is > 5 mm from the  anterior, superior, inferior, and medial margins  -Other findings: fibrocystic change (including microcysts) and usual ductal hyperplasia  -Calcifications associated with sclerotic stroma and with benign ducts and acini  -Prior core biopsy site change  -DCIS is estrogen receptor positive and progesterone receptor negative by immunohistochemistry (see  biomarker reporting template below)  -See tumor synoptic below  Electronically signed by Juana Palomares MD on 4/7/2022 at 10:45 PM  .  Synoptic Checklist  DCIS OF THE BREAST: Resection (DCIS OF THE BREAST: COMPLETE EXCISION - All Specimens) 8th Edition -  Protocol posted: 2/26/2020  SPECIMEN  Procedure Excision (less than total mastectomy)  Specimen Laterality Left  .      Labs, imaging and treatment plan reviewed with patient. All questions answered.        25 minutes spent on the date of the encounter doing chart review, review of outside records, review of test results, interpretation of tests, patient visit, documentation and  discussion with family         Sincerely,    Kenny Martinez MD

## 2022-08-25 NOTE — TELEPHONE ENCOUNTER
FYI-  Patient called to inform PCP she has been holding HCTZ if BP reading <120 systolic. She is mornitoring BP reading these include the following readin/69,103/66 and 109/65. Pt would like to know if PCP ok with hold. Denies any hypotension symptoms except fatigue. Advised pt continue to monitor BP. Message will be sent to PCP for review when back at clinic 2017. Patient verbalized agreement with plan.  
Yes, should be off hydrochlorothiazide with those blood pressures.  Thanks,  Dr. Carolina Pulliam MD/[facility]   
0

## 2022-09-26 DIAGNOSIS — N39.0 FREQUENT UTI: ICD-10-CM

## 2022-09-26 RX ORDER — NITROFURANTOIN 25; 75 MG/1; MG/1
CAPSULE ORAL
Qty: 90 CAPSULE | Refills: 0 | Status: SHIPPED | OUTPATIENT
Start: 2022-09-26 | End: 2023-01-04

## 2022-09-26 NOTE — TELEPHONE ENCOUNTER
Routing refill request to provider for review/approval because:  Drug not on the [facility] refill protocol   Takes 1 capsule daily for prevention of UTI    Last Written Prescription Date:  6/29/22  Last Fill Quantity: 90,  # refills: 0   Last office visit: 1/24/2022 with prescribing provider:     Future Office Visit:    Kiya Mccabe RN  [facility]

## 2022-09-26 NOTE — TELEPHONE ENCOUNTER
Patient due for appiontment.  I sent kyrie refill in.  Reception: Please call to schedule patient for yearly preventative appointment.  Thank you!  SJ

## 2022-10-10 ENCOUNTER — HEALTH MAINTENANCE LETTER (OUTPATIENT)
Age: 67
End: 2022-10-10

## 2022-10-31 NOTE — PROGRESS NOTES
[facility], [place]   Hospitalist Daily Progress Note                                                 Date of Admission:2018  ___________________________________  INTERVAL HISTORY (24 Hrs)/SUBJECTIVE:   Last 24 hr events, care team notes reviewed.     Overall doing well.   Pain controlled.   No fc  No NV  No cough or cp or sob.   Wanted to make sure we fax labs results to her Neurology (monthly labs)  L foot drop- chronic- seen by Orthotic before, does not want cam boot. Too heavy.       ROS: 4 point ROS neg other than the symptoms noted above in the interval history.    OBJECTIVE :   VITALS:    Temp:  [97.8  F (36.6  C)-98  F (36.7  C)] 98  F (36.7  C)  Pulse:  [68-78] 68  Resp:  [18] 18  BP: ()/(54-55) 101/55  SpO2:  [97 %-98 %] 97 %  Temp (24hrs), Av.9  F (36.6  C), Min:97.8  F (36.6  C), Max:98  F (36.7  C)    Wt Readings from Last 5 Encounters:   18 57.8 kg (127 lb 8 oz)   10/31/18 56.7 kg (125 lb)   17 59 kg (130 lb)   17 59.1 kg (130 lb 4.8 oz)   17 64.5 kg (142 lb 3.2 oz)        Intake/Output Summary (Last 24 hours) at 18 1412  Last data filed at 18 2209   Gross per 24 hour   Intake              480 ml   Output                0 ml   Net              480 ml       PHYSICAL EXAM:  General: Alert, interactive, NAD  HEENT: AT/NC, sclera anicteric, Moist mm  Neck: Supple, no JVD or lymphadenopathy  Resp/chest: non labored. CTA bl  Cardiac/Heart: s1s2 regular   GI/Abdomen: Soft, nontender, nondistended.   MSK/Extremities: distally warm and well perfused. L foot drop. L LE brace.   Skin: Warm and dry, no jaundice or rash on exposed areas.   Neuro: Alert & oriented x 4  Psychiatry: stable mood.           Medications:   I have reviewed this patient's current medications.      Data:   All laboratory and imaging data in the past 24 hours reviewed:    LABS:  [facility]  Recent Labs  Lab 18  0622 18  0658    144   POTASSIUM 4.4  4.0   CHLORIDE 111* 111*   CO2 27 28   ANIONGAP 6 5   * 93   BUN 20 17   CR 0.85 0.87   GFRESTIMATED 67 66   GFRESTBLACK 81 79   JORDON 9.0 8.9     CBC  Recent Labs  Lab 11/12/18  0622 11/09/18  0538 11/07/18  0658   WBC 3.2*  --  3.1*   RBC 2.48*  --  2.59*   HGB 7.4*  --  7.8*   HCT 23.3*  --  24.4*   MCV 94  --  94   MCH 29.8  --  30.1   MCHC 31.8  --  32.0   RDW 13.4  --  13.2    162 122*     INRNo lab results found in last 7 days.  Unresulted Labs Ordered in the Past 30 Days of this Admission     No orders found from 9/7/2018 to 11/7/2018.          No results found for this or any previous visit (from the past 24 hour(s)).      ASSESSMENT & PLAN :    Admitted to TCU 11/6/2018 for rehab needs.     # Left distal femur fracture 10/31.  S/P ORIF 11.01.   # L ankle fracture on Xray, non displaced. Reviewed by Ortho.     L LE pain, muscle spasms most helped by prn + scheduled baclofen (25mg, 30 mg), ordered 20 mg daily prn for muscle spasms as requested by the patient.   Oxycodone prn- minimize/avoid as able.   Acetaminophen prn.   Gabapentin 600, 900 mg  Hydroxyzine prn - adjuvant pain, anxiety, sleep.      PT/OT consult. Therapy as tolerated  -WB: TTWB LLE, hinged knee brace 0-40.  - Orthotics consult requested for short CAM boot to see if brace and boot can work together. Boot recommended for any WB activity.  - however patient declined cam boot, says its too heavy.   - Call Orthopedics and orthotics PRN    DVT PPx: Per Ortho: Lovenox SQ total 4 weeks.         #  Advanced multiple sclerosis  diagnosed 1993, manifested again predominantly by left-sided weakness w Urinary incontinence ? Neurogenic bladder. Last treated with Lemtrada 5-day infusion last year.   Stable at current. Follows Neurology as outpatient.   - Continue Valacyclovir 500 bid ppx, Oxybutynin.   - Outpatient follow-up with Neurology.      * 11.10: Patient requested for her monthly labs per her Neurology, while she is here, including cbc  w diff, bmp, UA, TSH, Ft4, CD4. Scheduled for 11.12.18. Charge RN to help to fax results.     # History of pancytopenia attributed to prior treatment for multiple sclerosis. Mild thrombocytopenia, stable  - follow-up CBC weekly  - transfuse for Hgb<7.0     # Essential hypertension, remains stable off PTA hydrochlorothiazide  - Ct to monitor.   - Ct to hold hydrochlorothiazide     # History of recurrent urinary tract infection.  On Nitrofurantoin for suppression.  11.01 recent Urine culture: no growth.    - Continue home Nitrofurantoin.     # Acute blood loss superimposed on normochromic normocytic anemia, stable     # Constipation, chronic 2nd to MS, aggravated by opiates, improved, with BM yesterday:   - Ct bowel regimen.     # Depression, anxiety: mood stable.   - Continue home sertraline 150 mg daily.      FEN:       Active Diet Order      Regular Diet Adult  Prophylaxis: Lovenox.   CODE: full.         Dispo: [facility]     D/w RN, JUSTIN Wisdom MD   Hospitalist (Internal Medicine)  Pager: [phone].              Metronidazole Counseling:  I discussed with the patient the risks of metronidazole including but not limited to seizures, nausea/vomiting, a metallic taste in the mouth, nausea/vomiting and severe allergy.

## 2022-11-05 ENCOUNTER — LAB (OUTPATIENT)
Dept: LAB | Facility: CLINIC | Age: 67
End: 2022-11-05
Payer: MEDICARE

## 2022-11-05 DIAGNOSIS — Z79.899 NEED FOR PROPHYLACTIC CHEMOTHERAPY: ICD-10-CM

## 2022-11-05 LAB
ALBUMIN UR-MCNC: NEGATIVE MG/DL
APPEARANCE UR: CLEAR
BILIRUB UR QL STRIP: NEGATIVE
COLOR UR AUTO: YELLOW
GLUCOSE UR STRIP-MCNC: NEGATIVE MG/DL
HGB UR QL STRIP: NEGATIVE
KETONES UR STRIP-MCNC: NEGATIVE MG/DL
LEUKOCYTE ESTERASE UR QL STRIP: NEGATIVE
NITRATE UR QL: NEGATIVE
PH UR STRIP: 7 [PH] (ref 5–7)
SP GR UR STRIP: 1.01 (ref 1–1.03)
UROBILINOGEN UR STRIP-ACNC: 0.2 E.U./DL

## 2022-11-05 PROCEDURE — 81003 URINALYSIS AUTO W/O SCOPE: CPT

## 2022-11-09 ENCOUNTER — LAB (OUTPATIENT)
Dept: LAB | Facility: CLINIC | Age: 67
End: 2022-11-09
Payer: MEDICARE

## 2022-11-09 ENCOUNTER — TELEPHONE (OUTPATIENT)
Dept: FAMILY MEDICINE | Facility: CLINIC | Age: 67
End: 2022-11-09

## 2022-11-09 DIAGNOSIS — Z20.822 ENCOUNTER FOR LABORATORY TESTING FOR COVID-19 VIRUS: ICD-10-CM

## 2022-11-09 PROCEDURE — U0005 INFEC AGEN DETEC AMPLI PROBE: HCPCS

## 2022-11-09 PROCEDURE — U0003 INFECTIOUS AGENT DETECTION BY NUCLEIC ACID (DNA OR RNA); SEVERE ACUTE RESPIRATORY SYNDROME CORONAVIRUS 2 (SARS-COV-2) (CORONAVIRUS DISEASE [COVID-19]), AMPLIFIED PROBE TECHNIQUE, MAKING USE OF HIGH THROUGHPUT TECHNOLOGIES AS DESCRIBED BY CMS-2020-01-R: HCPCS

## 2022-11-09 NOTE — TELEPHONE ENCOUNTER
Patient called to verify lab results which are negative for a UTI .  Patient requested the lab results be sent to neurologist- Dr. Favian Redman @ FAX: [phone].    Fax sent.     REVA Dorado RN  [facility]

## 2022-11-10 LAB — SARS-COV-2 RNA RESP QL NAA+PROBE: NEGATIVE

## 2022-12-31 DIAGNOSIS — N39.0 FREQUENT UTI: ICD-10-CM

## 2022-12-31 DIAGNOSIS — R35.0 URINARY FREQUENCY: ICD-10-CM

## 2023-01-03 ENCOUNTER — MYC MEDICAL ADVICE (OUTPATIENT)
Dept: FAMILY MEDICINE | Facility: CLINIC | Age: 68
End: 2023-01-03

## 2023-01-03 DIAGNOSIS — R35.0 URINARY FREQUENCY: Primary | ICD-10-CM

## 2023-01-03 NOTE — TELEPHONE ENCOUNTER
I called pt due to concerning symptoms    She says she is still having some fuzzy vision but says it is getting better. She wants to know how she can come in to drop off a urine sample.     She is incontinent so it is extremely difficult to get a sample collected at home.    She says she could have her   the urine sample supplies, get the sample at home and then her  can bring it back in.    Symptoms include frequent urination. No burning.     Says Dr. Pulliam used to have an ongoing order for you.     Dr. Pulliam and  Providers--    Are you willing to put in UA/UC orders for pt?    [phone]; ok to leave detailed message    REVA Smith RN  [facility]

## 2023-01-03 NOTE — TELEPHONE ENCOUNTER
Writer called patient and reviewed UA ordered by covering provider, MARTIN Spence PA-C; have  notify  staff he is here to  lab specimen collection supplies.    Patient verbalized understanding and in agreement with plan.    SAEID LowN, RN-[facility]

## 2023-01-03 NOTE — TELEPHONE ENCOUNTER
See 1/3/23 Parantezt message.    Routing refill request to provider for review/approval because:  Drug not on the [facility] refill protocol     Last Written Prescription Date:  9/26/22  Last Fill Quantity: 90,  # refills: 0   Last office visit: 1/24/2022 with prescribing provider:  KELSEY Tipton  Future Office Visit:        SAEID SmithN RN  [facility]

## 2023-01-04 DIAGNOSIS — N30.00 ACUTE CYSTITIS WITHOUT HEMATURIA: Primary | ICD-10-CM

## 2023-01-04 LAB
ALBUMIN UR-MCNC: NEGATIVE MG/DL
APPEARANCE UR: ABNORMAL
BACTERIA #/AREA URNS HPF: ABNORMAL /HPF
BILIRUB UR QL STRIP: NEGATIVE
COLOR UR AUTO: YELLOW
GLUCOSE UR STRIP-MCNC: NEGATIVE MG/DL
HGB UR QL STRIP: ABNORMAL
KETONES UR STRIP-MCNC: NEGATIVE MG/DL
LEUKOCYTE ESTERASE UR QL STRIP: ABNORMAL
NITRATE UR QL: NEGATIVE
PH UR STRIP: 5.5 [PH] (ref 5–7)
RBC #/AREA URNS AUTO: ABNORMAL /HPF
SP GR UR STRIP: <=1.005 (ref 1–1.03)
UROBILINOGEN UR STRIP-ACNC: 0.2 E.U./DL
WBC #/AREA URNS AUTO: ABNORMAL /HPF
WBC CLUMPS #/AREA URNS HPF: PRESENT /HPF
YEAST #/AREA URNS HPF: ABNORMAL /HPF

## 2023-01-04 PROCEDURE — 81001 URINALYSIS AUTO W/SCOPE: CPT | Performed by: FAMILY MEDICINE

## 2023-01-04 PROCEDURE — 87086 URINE CULTURE/COLONY COUNT: CPT | Performed by: FAMILY MEDICINE

## 2023-01-04 RX ORDER — NITROFURANTOIN 25; 75 MG/1; MG/1
CAPSULE ORAL
Qty: 90 CAPSULE | Refills: 0 | Status: SHIPPED | OUTPATIENT
Start: 2023-01-04 | End: 2023-03-17

## 2023-01-04 RX ORDER — NITROFURANTOIN 25; 75 MG/1; MG/1
100 CAPSULE ORAL 2 TIMES DAILY
Qty: 10 CAPSULE | Refills: 0 | Status: SHIPPED | OUTPATIENT
Start: 2023-01-04 | End: 2023-01-09

## 2023-01-06 LAB — BACTERIA UR CULT: NORMAL

## 2023-01-11 ENCOUNTER — TELEPHONE (OUTPATIENT)
Dept: RADIATION ONCOLOGY | Facility: CLINIC | Age: 68
End: 2023-01-11

## 2023-01-11 NOTE — TELEPHONE ENCOUNTER
RN attempted to touch base with pt Mon, Tuesday and Wed, No answer any of the days. Generic message on the answering so RN did not feel comfortable leaving a message. MD aware.

## 2023-01-13 ENCOUNTER — TRANSFERRED RECORDS (OUTPATIENT)
Dept: HEALTH INFORMATION MANAGEMENT | Facility: CLINIC | Age: 68
End: 2023-01-13

## 2023-01-18 ENCOUNTER — LAB (OUTPATIENT)
Dept: LAB | Facility: CLINIC | Age: 68
End: 2023-01-18
Payer: MEDICARE

## 2023-01-18 DIAGNOSIS — R30.0 DYSURIA: ICD-10-CM

## 2023-01-18 LAB
ALBUMIN UR-MCNC: NEGATIVE MG/DL
APPEARANCE UR: CLEAR
BACTERIA #/AREA URNS HPF: ABNORMAL /HPF
BILIRUB UR QL STRIP: NEGATIVE
COLOR UR AUTO: YELLOW
GLUCOSE UR STRIP-MCNC: NEGATIVE MG/DL
HGB UR QL STRIP: NEGATIVE
KETONES UR STRIP-MCNC: NEGATIVE MG/DL
LEUKOCYTE ESTERASE UR QL STRIP: ABNORMAL
NITRATE UR QL: NEGATIVE
PH UR STRIP: 6.5 [PH] (ref 5–7)
RBC #/AREA URNS AUTO: ABNORMAL /HPF
SP GR UR STRIP: 1.01 (ref 1–1.03)
UROBILINOGEN UR STRIP-ACNC: 0.2 E.U./DL
WBC #/AREA URNS AUTO: ABNORMAL /HPF
YEAST #/AREA URNS HPF: ABNORMAL /HPF

## 2023-01-18 PROCEDURE — 81001 URINALYSIS AUTO W/SCOPE: CPT

## 2023-01-20 ENCOUNTER — VIRTUAL VISIT (OUTPATIENT)
Dept: FAMILY MEDICINE | Facility: CLINIC | Age: 68
End: 2023-01-20
Payer: MEDICARE

## 2023-01-20 DIAGNOSIS — F33.1 MODERATE EPISODE OF RECURRENT MAJOR DEPRESSIVE DISORDER (H): ICD-10-CM

## 2023-01-20 DIAGNOSIS — E89.0 POSTABLATIVE HYPOTHYROIDISM: Primary | ICD-10-CM

## 2023-01-20 DIAGNOSIS — N39.0 RECURRENT URINARY TRACT INFECTION: ICD-10-CM

## 2023-01-20 DIAGNOSIS — G35 MULTIPLE SCLEROSIS (H): ICD-10-CM

## 2023-01-20 DIAGNOSIS — L22 DIAPER RASH: ICD-10-CM

## 2023-01-20 DIAGNOSIS — N18.30 STAGE 3 CHRONIC KIDNEY DISEASE, UNSPECIFIED WHETHER STAGE 3A OR 3B CKD (H): ICD-10-CM

## 2023-01-20 DIAGNOSIS — K59.09 CHRONIC CONSTIPATION: ICD-10-CM

## 2023-01-20 PROCEDURE — 99215 OFFICE O/P EST HI 40 MIN: CPT | Mod: 95 | Performed by: FAMILY MEDICINE

## 2023-01-20 PROCEDURE — 96127 BRIEF EMOTIONAL/BEHAV ASSMT: CPT | Mod: 95 | Performed by: FAMILY MEDICINE

## 2023-01-20 RX ORDER — FERROUS SULFATE 325(65) MG
325 TABLET ORAL
Status: ON HOLD | COMMUNITY
End: 2024-01-06

## 2023-01-20 RX ORDER — LEVOTHYROXINE SODIUM 100 UG/1
100 TABLET ORAL EVERY MORNING
Qty: 90 TABLET | Refills: 1 | Status: SHIPPED | OUTPATIENT
Start: 2023-01-20 | End: 2023-03-17

## 2023-01-20 RX ORDER — DULOXETIN HYDROCHLORIDE 20 MG/1
20 CAPSULE, DELAYED RELEASE ORAL DAILY
Qty: 90 CAPSULE | Refills: 3 | Status: SHIPPED | OUTPATIENT
Start: 2023-01-20 | End: 2023-03-17

## 2023-01-20 RX ORDER — DULOXETIN HYDROCHLORIDE 60 MG/1
CAPSULE, DELAYED RELEASE ORAL
Qty: 90 CAPSULE | Refills: 3 | Status: SHIPPED | OUTPATIENT
Start: 2023-01-20 | End: 2023-03-17

## 2023-01-20 ASSESSMENT — PATIENT HEALTH QUESTIONNAIRE - PHQ9: SUM OF ALL RESPONSES TO PHQ QUESTIONS 1-9: 10

## 2023-01-20 NOTE — PROGRESS NOTES
Marylee is a 67 year old who is being evaluated via a billable video visit.      How would you like to obtain your AVS? [employer] also mail a copy  If the video visit is dropped, the invitation should be resent by: Text to cell phone: [phone]  Will anyone else be joining your video visit? No        Assessment & Plan     Marylee was seen today for follow up.    Diagnoses and all orders for this visit:    Postablative hypothyroidism  Comments:  TSH at neurologist was 30  Has appt with endo Dr. Jones next week  In meantime, will adjust levothyroxine to 100 (from 75)  Recheck TSH 6 wks  Orders:  -     levothyroxine (SYNTHROID/LEVOTHROID) 100 MCG tablet; Take 1 tablet (100 mcg) by mouth every morning Recheck TSH with lab only visit before next refill    Multiple sclerosis (H)  Comments:  Managed by neurology team - Dr. Redman and Dr Martínez  They do labs every month    Moderate episode of recurrent major depressive disorder (H)  Comments:  Doing okay on Cymbalta 80 mg  Stable  PHQ9 up to date  Refilled  Orders:  -     DULoxetine (CYMBALTA) 20 MG capsule; Take 1 capsule (20 mg) by mouth daily Take along with 60 mg for a total of 80 mg  -     DULoxetine (CYMBALTA) 60 MG capsule; TAKE 1 CAPSULE BY MOUTH EVERY DAY    Rash from Protective Underwear  Comments:  Air time as much as possible  Be out of wheelchair as much as possible  Use prescription butt paste twice a day  LOADS of A&D on top  F/U if worsening  Orders:  -     COMPOUNDED NON-CONTROLLED SUBSTANCE (CMPD RX) - PHARMACY TO MIX COMPOUNDED MEDICATION; Use on sore areas twice a day.  Place A&D on top Mix for a total of 240 gm: Stomahesive paste 30 gm Talc 30 gm Nystatin 15 gm Mineral oil 45 ml Eucerin Crm 120 gm    Recurrent urinary tract infection  Comments:  E-visits not covered  Will put in standing order for UA given mobility issues  If positive, will need to call clinic to talk to triage RN to do phone visit   Orders:  -     UA Macro with Reflex to Micro  and Culture - lab collect; Standing    Chronic constipation  Comments:  Add miralax or mag citrate every other day to prevent build up and pain  If persists, let us know and consider repeat imaging (CT Abd/Pelvis) for RLQ pain    Stage 3 chronic kidney disease, unspecified whether stage 3a or 3b CKD (H)  Comments:  Consider switch from HCTZ to ACE  Ran out of time to discuss today; will put on docket for March discussion at visit  Avoid NSAIDs    Other orders  -     REVIEW OF HEALTH MAINTENANCE PROTOCOL ORDERS            I spent a total of 46 minutes on the day of the visit.   Time spent doing chart review, history and exam, documentation and further activities per the note        Return for already scheduled appt.    Carolina Pulliam MD  [facility]    Subjective Marylee is a 67 year old accompanied by her alone, presenting for the following health issues:  Follow Up (Lab work)      HPI     At neurologist -   Recent Lab work - showed hgb 9.01 hematocrit 27.6    TSH: 30    Had bladder infection last lab tests.    Thyroid really high.  30  Takes levothyroxine every morning when first gets up  Takes it an hour before other medicines.  Has noticed: since New Year's, slept all day.    Hgb low:  Still taking iron  Has been low for some time.        Also - because of incontinence.  Wears protective underwear  Wears more than one pair  Manuel helps her get them off  If gone for longer day, might be sitting in pee for awhile  Now: in between crease in front of legs and underneath legs in back skin is so raw  Painful  Been putting on A&D    Also - now when has BM feels like on right side it's kind of sore.  After a BM right side intestines are sore  BM every 5 days, then 2 or 3 days in a row      CT scan 2021:  IMPRESSION:   1. Large colonic stool burden. Small amount of ascites demonstrated  with some presacral/perirectal edema and fluid. The adjacent wall of  the rectum does not appear  significantly thickened but this could be  correlated clinically with symptoms for stercoral colitis given rectal  stool burden.  2. No evidence of appendicitis or bowel obstruction as questioned.  3. Enhancement of the bladder wall which could indicate cystitis  suggest correlation with urinalysis.       Review of Systems   Constitutional, HEENT, cardiovascular, pulmonary, gi and gu systems are negative, except as otherwise noted.      Objective           Vitals:  No vitals were obtained today due to virtual visit.    Physical Exam   GENERAL: Healthy, alert and no distress  EYES: Eyes grossly normal to inspection.  No discharge or erythema, or obvious scleral/conjunctival abnormalities.  RESP: No audible wheeze, cough, or visible cyanosis.  No visible retractions or increased work of breathing.    SKIN: Visible skin clear. No significant rash, abnormal pigmentation or lesions.  NEURO: Cranial nerves grossly intact.  Mentation and speech appropriate for age.  PSYCH: Mentation appears normal, affect normal/bright, judgement and insight intact, normal speech and appearance well-groomed.            Video-Visit Details    Type of service:  Video Visit   Video Start Time: 4:31 PM  Video End Time:5:07 PM    Originating Location (pt. Location): Home  Distant Location (provider location):  On-site  Platform used for Video Visit: Ting[employer]

## 2023-01-30 ENCOUNTER — LAB (OUTPATIENT)
Dept: LAB | Facility: CLINIC | Age: 68
End: 2023-01-30
Payer: MEDICARE

## 2023-01-30 ENCOUNTER — OFFICE VISIT (OUTPATIENT)
Dept: ENDOCRINOLOGY | Facility: CLINIC | Age: 68
End: 2023-01-30
Payer: MEDICARE

## 2023-01-30 VITALS — SYSTOLIC BLOOD PRESSURE: 138 MMHG | DIASTOLIC BLOOD PRESSURE: 76 MMHG

## 2023-01-30 DIAGNOSIS — E89.0 POSTABLATIVE HYPOTHYROIDISM: ICD-10-CM

## 2023-01-30 DIAGNOSIS — M81.0 OSTEOPOROSIS OF MULTIPLE SITES: ICD-10-CM

## 2023-01-30 DIAGNOSIS — N18.30 CKD (CHRONIC KIDNEY DISEASE) STAGE 3, GFR 30-59 ML/MIN (H): ICD-10-CM

## 2023-01-30 DIAGNOSIS — E04.2 MULTIPLE THYROID NODULES: ICD-10-CM

## 2023-01-30 DIAGNOSIS — Z13.220 SCREENING FOR HYPERLIPIDEMIA: ICD-10-CM

## 2023-01-30 DIAGNOSIS — E89.0 POSTABLATIVE HYPOTHYROIDISM: Primary | ICD-10-CM

## 2023-01-30 DIAGNOSIS — E78.5 HYPERLIPIDEMIA WITH TARGET LDL LESS THAN 130: ICD-10-CM

## 2023-01-30 LAB
ALBUMIN SERPL BCG-MCNC: 4 G/DL (ref 3.5–5.2)
ANION GAP SERPL CALCULATED.3IONS-SCNC: 8 MMOL/L (ref 7–15)
BUN SERPL-MCNC: 16.1 MG/DL (ref 8–23)
CALCIUM SERPL-MCNC: 10 MG/DL (ref 8.8–10.2)
CHLORIDE SERPL-SCNC: 104 MMOL/L (ref 98–107)
CREAT SERPL-MCNC: 0.96 MG/DL (ref 0.51–0.95)
DEPRECATED HCO3 PLAS-SCNC: 31 MMOL/L (ref 22–29)
GFR SERPL CREATININE-BSD FRML MDRD: 65 ML/MIN/1.73M2
GLUCOSE SERPL-MCNC: 94 MG/DL (ref 70–99)
PHOSPHATE SERPL-MCNC: 3.4 MG/DL (ref 2.5–4.5)
POTASSIUM SERPL-SCNC: 4 MMOL/L (ref 3.4–5.3)
PTH-INTACT SERPL-MCNC: 43 PG/ML (ref 15–65)
SODIUM SERPL-SCNC: 143 MMOL/L (ref 136–145)
T4 FREE SERPL-MCNC: 1.41 NG/DL (ref 0.9–1.7)
TSH SERPL DL<=0.005 MIU/L-ACNC: 6.65 UIU/ML (ref 0.3–4.2)

## 2023-01-30 PROCEDURE — 80069 RENAL FUNCTION PANEL: CPT | Performed by: PATHOLOGY

## 2023-01-30 PROCEDURE — 99215 OFFICE O/P EST HI 40 MIN: CPT | Performed by: INTERNAL MEDICINE

## 2023-01-30 PROCEDURE — 83970 ASSAY OF PARATHORMONE: CPT | Performed by: PATHOLOGY

## 2023-01-30 PROCEDURE — 82306 VITAMIN D 25 HYDROXY: CPT | Performed by: INTERNAL MEDICINE

## 2023-01-30 PROCEDURE — 84443 ASSAY THYROID STIM HORMONE: CPT | Performed by: PATHOLOGY

## 2023-01-30 PROCEDURE — 36415 COLL VENOUS BLD VENIPUNCTURE: CPT | Performed by: PATHOLOGY

## 2023-01-30 PROCEDURE — 84439 ASSAY OF FREE THYROXINE: CPT | Performed by: PATHOLOGY

## 2023-01-30 RX ORDER — ZOLEDRONIC ACID 5 MG/100ML
5 INJECTION, SOLUTION INTRAVENOUS ONCE
Status: CANCELLED
Start: 2023-02-28

## 2023-01-30 ASSESSMENT — PAIN SCALES - GENERAL: PAINLEVEL: NO PAIN (0)

## 2023-01-30 NOTE — PROGRESS NOTES
1.  Post ablative hypothyroidism, which ensured following radioiodine treatment for Graves' disease  Marylee follows up with Dr. Redman for MS, at [facility].  The patient has had the thyroid hormone levels checked quite frequently, through her neurology clinic, given prior history of treatment with [place], in [id number].  She was formally diagnosed with Graves' disease in October 2020.  Subsequently, she underwent treatment with 9.3 mCi I-131, on 2/9/2021.  Prior to treatment, the scan revealed a 24-hour uptake of 87.5%, with a computer estimated weight of 42.2 g.  The radiotracer uptake was uniform, with the exception of the caudal region in the inferior left thyroid lobe, which corresponded to a nodule biopsied in 2017.     In 2017, she was incidentally diagnosed with thyroid nodules during a CAT scan.  The thyroid ultrasound from 9/7/2017 revealed multiple thyroid nodules.  The left inferior #4 thyroid nodule, measuring 2.2 cm, was benign on biopsy. Overall, the nodules had a benign ultrasound appearance, mostly spongiform.  The biopsied nodule had an internal macrocalcification, minimal internal vascularity, but no definite features suggestive of malignancy.  The patient has no prior history of radiation exposure or a family history of thyroid disease.  On the follow-up thyroid ultrasound from 1/14/2021, there were no significant changes of the thyroid nodules and the thyroid gland was very vascular.    She was started on treatment with levothyroxine on 6/10/2021, and the dose was titrated to the current dose of 100 mcg daily, which the patient has been taking since 1/20/23, when the dose was increased from 75 mcg daily. The TSH level was 30.5 on 1/10/23, checked at her neurology clinic.  According to the patient, the patient has the thyroid hormone levels checked monthly by her neurologist and she reports having normal values until the recent lab work from January.    She has been  taking 100 mcg levothyroxine daily for a week or so.  She continues to endorse fatigue, constipation and cold intolerance.  The cold intolerance has worsened but this is hard to evaluate given the cold weather.  Denies hair loss, dysphagia, voice hoarseness or cough.  At times, when eating fast or with certain food products, the food gets stuck in the epigastric area.  She continues to take Pepcid for heartburns.    According to her , who is accompanying her today per her, it is very easy for her to fall asleep and take a nap.  In particular, he mentions an episode during New Year's Day, when she slept for over 24 hours.    Her weight has been stable.   She reports being compliant in taking the levothyroxine on an empty stomach, hours before having breakfast and 1 hour prior to having other medications.  She pays attention and she takes the Pepcid, the multivitamin, iron and calcium supplements at night.  MiraLAX is taken every other day, around lunchtime.    2. Osteoporosis     Prior fractures:  2009 Right nondisplaced intertrochanteric fracture.  2013 Left tibial and fibular fracture   10/2018 Transverse fracture of the distal left femoral diaphysis, after falling from standing height.  6/2020 Left tibial plateau fracture     Treatment with Fosamax was recommended by her orthopedic surgeon, in 2018. As per patient, she started Fosamax in 2020 and she took it for approximately 1 year.  For the last 5-6 years, she has been using a wheelchair.  She is not aware of decreased height over the years.  She does not drink milk and she only has cheese, intermittently.  Treatment with omeprazole was discontinued and it was replaced by Pepcid.  Marylee smoked since age 17, with intermittent breaks, generally less than half a pack a day.  She quit smoking in 2022.   She has both upper and lower dentures.    Her  confirms she has been taking the following calcium and vitamin D supplements:  600 mg calcium +800  units vitamin D BID  A multivitamin which contains 700 units vitamin D daily  5000 units vitamin D daily.  The dose of vitamin D was increased by her neurologist, based on her low vitamin D levels in their lab work.  The patient confirms she has been taking this dose for the last few months.    On the DEXA scan images from 11/5/2021, L2-L4 T score was -5.3, left femoral neck T score was -5.1, total left femur T score -6, 33% distal radius T score -3.5.    The patient received one dose of Reclast on 2/24/2022, without side effects.     Pertinent labs reviewed:  11/5/21   Vitamin D 77, ACTH<10, cortisol 7.9  8/12/21   calcium 9.5, GFR 54, vitamin D 67, phos 3.4, albumin 3.6, PTH 96 (18-80).  The vitamin D supplement was discontinued a couple of months prior to this lab work.    3. Adrenal nodularity, incidentally noticed on the abdominal CT images from June 2021, small and bilateral.     Pertinent labs reviewed:  2/4/22 salivary cortisol 0.624 (0.022-0.254) - contaminated by blood   12/21 negative tissue transglutaminase antibodies, 1 mg dexamethasone suppression test revealed a cortisol level 2.2, with an appropriate dexamethasone level.    2011 cortisol 23.2     Past Medical History  Past Medical History:   Diagnosis Date     Gastro-oesophageal reflux disease      Multiple sclerosis (H) 1990s   May 2020, diagnosed with Covid  Depression  Pancytopenia dating back to at least 2014  Hyperlipidemia  Lung nodules  Candida esophagitis  UTI  R intertrochanteric femoral fracture 2009   Left tibial and fibular fracture in 2013  Left tibial plateau fracture June 2020   Left femoral fracture in 10/2018, after falling from standing height (above the knee)    Medications    Current Outpatient Medications:      acetaminophen (TYLENOL) 500 MG tablet, Take 1-2 tablets (500-1,000 mg) by mouth every 8 hours as needed for mild pain or fever (greater than 101 degrees), Disp: , Rfl:      baclofen (LIORESAL) 10 MG tablet, 2 times daily  Take 2 tablets (20 MG) by mouth every morning, 1 tablet (10 MG) by mouth daily in the afternoon as needed, and 3 tablets (30 MG) by mouth every evening before bedtime., Disp: , Rfl:      calcium carbonate 500 mg, elemental, (OSCAL 500) 1250 (500 Ca) MG TABS tablet, Take by mouth every evening, Disp: , Rfl:      COMPOUNDED NON-CONTROLLED SUBSTANCE (CMPD RX) - PHARMACY TO MIX COMPOUNDED MEDICATION, Use on sore areas twice a day.  Place A&D on top Mix for a total of 240 gm: Stomahesive paste 30 gm Talc 30 gm Nystatin 15 gm Mineral oil 45 ml Eucerin Crm 120 gm, Disp: 480 g, Rfl: 11     DULoxetine (CYMBALTA) 20 MG capsule, Take 1 capsule (20 mg) by mouth daily Take along with 60 mg for a total of 80 mg, Disp: 90 capsule, Rfl: 3     DULoxetine (CYMBALTA) 60 MG capsule, TAKE 1 CAPSULE BY MOUTH EVERY DAY, Disp: 90 capsule, Rfl: 3     famotidine (PEPCID) 40 MG tablet, TAKE 1 TABLET BY MOUTH TWICE A DAY (Patient taking differently: Take 40 mg by mouth At Bedtime), Disp: 180 tablet, Rfl: 1     ferrous sulfate (FEROSUL) 325 (65 Fe) MG tablet, Take 325 mg by mouth daily (with breakfast), Disp: , Rfl:      gabapentin (NEURONTIN) 300 MG capsule, Take 300 mg by mouth 2 times daily Take 2 tablets (600 MG) by mouth 3-4 times daily., Disp: , Rfl:      hydrochlorothiazide (HYDRODIURIL) 12.5 MG tablet, Take 1 tablet (12.5 mg) by mouth daily (Patient taking differently: Take 12.5 mg by mouth every morning), Disp: 90 tablet, Rfl: 3     latanoprost (XALATAN) 0.005 % ophthalmic solution, INSTILL 1 DROP INTO BOTH EYES EVERY DAY AS DIRECTED PT WILL NEED TO BE SEEN FOR FUTURE REFILLS, Disp: , Rfl:      levothyroxine (SYNTHROID/LEVOTHROID) 100 MCG tablet, Take 1 tablet (100 mcg) by mouth every morning Recheck TSH with lab only visit before next refill, Disp: 90 tablet, Rfl: 1     magnesium citrate solution (NOT currently available), Take 296 mLs by mouth once, Disp: , Rfl:      multivitamin w/minerals (THERA-VIT-M) tablet, Take 1 tablet by  mouth every evening, Disp: , Rfl:      nitroFURantoin macrocrystal-monohydrate (MACROBID) 100 MG capsule, Take 1 capsule by mouth once daily, Disp: 90 capsule, Rfl: 0     oxybutynin (DITROPAN-XL) 10 MG 24 hr tablet, Take 1 tablet (10 mg) by mouth 2 times daily, Disp: 30 tablet, Rfl:      valACYclovir (VALTREX) 500 MG tablet, Take 1 tablet (500 mg) by mouth 2 times daily, Disp: 12 tablet, Rfl: 5    Allergies  Allergies   Allergen Reactions     Amantadine      hallucinations     Budesonide      Symbicort Rash     Family History  family history includes Cancer in her maternal grandfather; Heart Disease in her father, maternal grandmother, mother, and paternal grandfather.  She has positive family of autoimmune disease. Her mom has lupus and hypopituitarism. No family history of thyroid disease.  Maternal grandfather had throat cancer. Type 1 diabetes - maternal grandmother. Diabetes - maternal aunt.     Social History  , 2 children.  She denies drinking alcohol or using illicit drugs.  History of long-term smoking.  Social History     Tobacco Use     Smoking status: Former     Packs/day: 0.25     Types: Cigarettes     Quit date: 2022     Years since quittin.0     Smokeless tobacco: Former     Tobacco comments:     22 smoking 1 cigarette/day   Substance Use Topics     Alcohol use: Yes     Alcohol/week: 0.0 standard drinks     Comment: occasional      Drug use: No      Physical Exam     Wt Readings from Last 10 Encounters:   22 59 kg (130 lb)   22 59 kg (130 lb)   22 59 kg (130 lb)   21 59 kg (130 lb)   21 59 kg (130 lb)   04/10/21 59 kg (130 lb)   20 59 kg (130 lb)   10/23/19 59 kg (130 lb)   19 59 kg (130 lb)   19 59 kg (130 lb)     BP Readings from Last 6 Encounters:   23 138/76   22 121/67   22 118/61   22 129/55   22 120/70   22 131/79     /76 (BP Location: Right arm, Patient Position: Sitting, Cuff Size:  Adult Regular)   LMP  (LMP Unknown)   There is no height or weight on file to calculate BMI.     General appearance: Pleasant, sitting in a wheelchair  Eyes: conjunctivae and extraocular motions are normal. Pupils are equal, round, and reactive to light. No lid lag, no stare.  HENT: neck no JVD, no bruits, no thyromegaly, no palpable nodules  Cardiovascular: regular rhythm, no murmurs, distal pulses palpable, bilateral lower extremities edema, left foot brace  Respiratory: chest clear, no rales, no rhonchi  Neurologic: Absent knee reflexes, no resting tremor  Psychiatric: affect and judgment normal   Skin: No abdominal striae, no hirsutism or acne    DATA REVIEW    TSH   Date Value Ref Range Status   11/05/2021 1.33 0.40 - 4.00 mU/L Final   06/30/2021 191.68 (H) 0.40 - 4.00 mU/L Final     T4 Total   Date Value Ref Range Status   02/28/2011 7.6 4.7 - 13.3 ug/dl Final     T4 Free   Date Value Ref Range Status   06/30/2021 0.67 (L) 0.76 - 1.46 ng/dL Final     Free T4   Date Value Ref Range Status   11/05/2021 1.15 0.76 - 1.46 ng/dL Final       ASSESSMENT/PLAN:     1.  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  The patient has her thyroid hormone levels checked monthly through her neurology clinic.  Recent labs revealed hypothyroidism, although the patient reports taking levothyroxine correctly and her weight has not changed.  The dose of levothyroxine was appropriately increased to 100 mcg daily and the plan is to recheck the thyroid hormone levels in 5 weeks.    2.  Osteoporosis  The diagnosis is based on the prior history of femoral fractures after falling from a standing height, in 2009 and 2018.  The DEXA scan confirmed very low T-scores.  Risk factors for osteoporosis identified: Postmenopausal status, sedentary lifestyle due to MS, treatment with PPI, hyperthyroidism, smoking.  Screening for celiac disease was negative.   From 6/2020 until 2021, the  patient was medicated with Fosamax (for ~1 year).  She received one dose of Reclast in 2/22.  Recommendations:  Follow-up BMP, albumin, vitamin D and PTH levels today.  Adjust the dose of vitamin D based on the lab results. She takes a fairly high dose of vitamin D and we discussed about the risk of toxicity and side effects from vitamin D overdose.  Of note that the vitamin D level is high when checked with our assay, not consistent with the lab results from her neurology clinic.  This might be explained by a difference in assay methodology.  The plan is to schedule a second dose of Reclast in February.  Reminded the patient to maintain a good hydration the day of infusion.    2.  Borderline cortisol on the dexamethasone suppression test from December 2021  Salivary cortisol was unreliable due to mucosal bleeding.  Unable to check urinary cortisol given the impaired GFR.  Clinically, the patient does not endorse definite signs or symptoms suggestive of Cushing syndrome.   We are going to continue to monitor clinically.    4. Thyroid nodules, with no ultrasound features suggestive of malignancy  Clinical exam today was unremarkable.    Orders Placed This Encounter   Procedures     T4 free     TSH     25 Hydroxyvitamin D2 and D3     Albumin level     Basic metabolic panel     Phosphorus     Parathyroid Hormone Intact     43 minutes spent on the date of the encounter doing chart review, history and exam, documentation and further activities as noted above.

## 2023-01-30 NOTE — LETTER
1/30/2023       RE: Marylee Woods  [address]  [facility] [address]     Dear Colleague,    Thank you for referring your patient, Marylee Woods, to the [facility] [place] at [facility]. Please see a copy of my visit note below.      1.  Post ablative hypothyroidism, which ensured following radioiodine treatment for Graves' disease  Marylee follows up with Dr. Redman for MS, at [facility].  The patient has had the thyroid hormone levels checked quite frequently, through her neurology clinic, given prior history of treatment with [place], in [id number].  She was formally diagnosed with Graves' disease in October 2020.  Subsequently, she underwent treatment with 9.3 mCi I-131, on 2/9/2021.  Prior to treatment, the scan revealed a 24-hour uptake of 87.5%, with a computer estimated weight of 42.2 g.  The radiotracer uptake was uniform, with the exception of the caudal region in the inferior left thyroid lobe, which corresponded to a nodule biopsied in 2017.     In 2017, she was incidentally diagnosed with thyroid nodules during a CAT scan.  The thyroid ultrasound from 9/7/2017 revealed multiple thyroid nodules.  The left inferior #4 thyroid nodule, measuring 2.2 cm, was benign on biopsy. Overall, the nodules had a benign ultrasound appearance, mostly spongiform.  The biopsied nodule had an internal macrocalcification, minimal internal vascularity, but no definite features suggestive of malignancy.  The patient has no prior history of radiation exposure or a family history of thyroid disease.  On the follow-up thyroid ultrasound from 1/14/2021, there were no significant changes of the thyroid nodules and the thyroid gland was very vascular.    She was started on treatment with levothyroxine on 6/10/2021, and the dose was titrated to the current dose of 100 mcg daily, which the patient has been taking since  1/20/23, when the dose was increased from 75 mcg daily. The TSH level was 30.5 on 1/10/23, checked at her neurology clinic.  According to the patient, the patient has the thyroid hormone levels checked monthly by her neurologist and she reports having normal values until the recent lab work from January.    She has been taking 100 mcg levothyroxine daily for a week or so.  She continues to endorse fatigue, constipation and cold intolerance.  The cold intolerance has worsened but this is hard to evaluate given the cold weather.  Denies hair loss, dysphagia, voice hoarseness or cough.  At times, when eating fast or with certain food products, the food gets stuck in the epigastric area.  She continues to take Pepcid for heartburns.    According to her , who is accompanying her today per her, it is very easy for her to fall asleep and take a nap.  In particular, he mentions an episode during New Year's Day, when she slept for over 24 hours.    Her weight has been stable.   She reports being compliant in taking the levothyroxine on an empty stomach, hours before having breakfast and 1 hour prior to having other medications.  She pays attention and she takes the Pepcid, the multivitamin, iron and calcium supplements at night.  MiraLAX is taken every other day, around lunchtime.    2. Osteoporosis     Prior fractures:  2009 Right nondisplaced intertrochanteric fracture.  2013 Left tibial and fibular fracture   10/2018 Transverse fracture of the distal left femoral diaphysis, after falling from standing height.  6/2020 Left tibial plateau fracture     Treatment with Fosamax was recommended by her orthopedic surgeon, in 2018. As per patient, she started Fosamax in 2020 and she took it for approximately 1 year.  For the last 5-6 years, she has been using a wheelchair.  She is not aware of decreased height over the years.  She does not drink milk and she only has cheese, intermittently.  Treatment with omeprazole was  discontinued and it was replaced by Pepcid.  Marylee smoked since age 17, with intermittent breaks, generally less than half a pack a day.  She quit smoking in 2022.   She has both upper and lower dentures.    Her  confirms she has been taking the following calcium and vitamin D supplements:  600 mg calcium +800 units vitamin D BID  A multivitamin which contains 700 units vitamin D daily  5000 units vitamin D daily.  The dose of vitamin D was increased by her neurologist, based on her low vitamin D levels in their lab work.  The patient confirms she has been taking this dose for the last few months.    On the DEXA scan images from 11/5/2021, L2-L4 T score was -5.3, left femoral neck T score was -5.1, total left femur T score -6, 33% distal radius T score -3.5.    The patient received one dose of Reclast on 2/24/2022, without side effects.     Pertinent labs reviewed:  11/5/21   Vitamin D 77, ACTH<10, cortisol 7.9  8/12/21   calcium 9.5, GFR 54, vitamin D 67, phos 3.4, albumin 3.6, PTH 96 (18-80).  The vitamin D supplement was discontinued a couple of months prior to this lab work.    3. Adrenal nodularity, incidentally noticed on the abdominal CT images from June 2021, small and bilateral.     Pertinent labs reviewed:  2/4/22 salivary cortisol 0.624 (0.022-0.254) - contaminated by blood   12/21 negative tissue transglutaminase antibodies, 1 mg dexamethasone suppression test revealed a cortisol level 2.2, with an appropriate dexamethasone level.    2011 cortisol 23.2     Past Medical History  Past Medical History:   Diagnosis Date     Gastro-oesophageal reflux disease      Multiple sclerosis (H) 1990s   May 2020, diagnosed with Covid  Depression  Pancytopenia dating back to at least 2014  Hyperlipidemia  Lung nodules  Candida esophagitis  UTI  R intertrochanteric femoral fracture 2009   Left tibial and fibular fracture in 2013  Left tibial plateau fracture June 2020   Left femoral fracture in 10/2018, after  falling from standing height (above the knee)    Medications    Current Outpatient Medications:      acetaminophen (TYLENOL) 500 MG tablet, Take 1-2 tablets (500-1,000 mg) by mouth every 8 hours as needed for mild pain or fever (greater than 101 degrees), Disp: , Rfl:      baclofen (LIORESAL) 10 MG tablet, 2 times daily Take 2 tablets (20 MG) by mouth every morning, 1 tablet (10 MG) by mouth daily in the afternoon as needed, and 3 tablets (30 MG) by mouth every evening before bedtime., Disp: , Rfl:      calcium carbonate 500 mg, elemental, (OSCAL 500) 1250 (500 Ca) MG TABS tablet, Take by mouth every evening, Disp: , Rfl:      COMPOUNDED NON-CONTROLLED SUBSTANCE (CMPD RX) - PHARMACY TO MIX COMPOUNDED MEDICATION, Use on sore areas twice a day.  Place A&D on top Mix for a total of 240 gm: Stomahesive paste 30 gm Talc 30 gm Nystatin 15 gm Mineral oil 45 ml Eucerin Crm 120 gm, Disp: 480 g, Rfl: 11     DULoxetine (CYMBALTA) 20 MG capsule, Take 1 capsule (20 mg) by mouth daily Take along with 60 mg for a total of 80 mg, Disp: 90 capsule, Rfl: 3     DULoxetine (CYMBALTA) 60 MG capsule, TAKE 1 CAPSULE BY MOUTH EVERY DAY, Disp: 90 capsule, Rfl: 3     famotidine (PEPCID) 40 MG tablet, TAKE 1 TABLET BY MOUTH TWICE A DAY (Patient taking differently: Take 40 mg by mouth At Bedtime), Disp: 180 tablet, Rfl: 1     ferrous sulfate (FEROSUL) 325 (65 Fe) MG tablet, Take 325 mg by mouth daily (with breakfast), Disp: , Rfl:      gabapentin (NEURONTIN) 300 MG capsule, Take 300 mg by mouth 2 times daily Take 2 tablets (600 MG) by mouth 3-4 times daily., Disp: , Rfl:      hydrochlorothiazide (HYDRODIURIL) 12.5 MG tablet, Take 1 tablet (12.5 mg) by mouth daily (Patient taking differently: Take 12.5 mg by mouth every morning), Disp: 90 tablet, Rfl: 3     latanoprost (XALATAN) 0.005 % ophthalmic solution, INSTILL 1 DROP INTO BOTH EYES EVERY DAY AS DIRECTED PT WILL NEED TO BE SEEN FOR FUTURE REFILLS, Disp: , Rfl:      levothyroxine  (SYNTHROID/LEVOTHROID) 100 MCG tablet, Take 1 tablet (100 mcg) by mouth every morning Recheck TSH with lab only visit before next refill, Disp: 90 tablet, Rfl: 1     magnesium citrate solution (NOT currently available), Take 296 mLs by mouth once, Disp: , Rfl:      multivitamin w/minerals (THERA-VIT-M) tablet, Take 1 tablet by mouth every evening, Disp: , Rfl:      nitroFURantoin macrocrystal-monohydrate (MACROBID) 100 MG capsule, Take 1 capsule by mouth once daily, Disp: 90 capsule, Rfl: 0     oxybutynin (DITROPAN-XL) 10 MG 24 hr tablet, Take 1 tablet (10 mg) by mouth 2 times daily, Disp: 30 tablet, Rfl:      valACYclovir (VALTREX) 500 MG tablet, Take 1 tablet (500 mg) by mouth 2 times daily, Disp: 12 tablet, Rfl: 5    Allergies  Allergies   Allergen Reactions     Amantadine      hallucinations     Budesonide      Symbicort Rash     Family History  family history includes Cancer in her maternal grandfather; Heart Disease in her father, maternal grandmother, mother, and paternal grandfather.  She has positive family of autoimmune disease. Her mom has lupus and hypopituitarism. No family history of thyroid disease.  Maternal grandfather had throat cancer. Type 1 diabetes - maternal grandmother. Diabetes - maternal aunt.     Social History  , 2 children.  She denies drinking alcohol or using illicit drugs.  History of long-term smoking.  Social History     Tobacco Use     Smoking status: Former     Packs/day: 0.25     Types: Cigarettes     Quit date: 2022     Years since quittin.0     Smokeless tobacco: Former     Tobacco comments:     22 smoking 1 cigarette/day   Substance Use Topics     Alcohol use: Yes     Alcohol/week: 0.0 standard drinks     Comment: occasional      Drug use: No      Physical Exam     Wt Readings from Last 10 Encounters:   22 59 kg (130 lb)   22 59 kg (130 lb)   22 59 kg (130 lb)   21 59 kg (130 lb)   21 59 kg (130 lb)   04/10/21 59 kg (130 lb)    07/03/20 59 kg (130 lb)   10/23/19 59 kg (130 lb)   05/04/19 59 kg (130 lb)   03/22/19 59 kg (130 lb)     BP Readings from Last 6 Encounters:   01/30/23 138/76   08/11/22 121/67   07/07/22 118/61   06/06/22 129/55   05/05/22 120/70   04/14/22 131/79     /76 (BP Location: Right arm, Patient Position: Sitting, Cuff Size: Adult Regular)   LMP  (LMP Unknown)   There is no height or weight on file to calculate BMI.     General appearance: Pleasant, sitting in a wheelchair  Eyes: conjunctivae and extraocular motions are normal. Pupils are equal, round, and reactive to light. No lid lag, no stare.  HENT: neck no JVD, no bruits, no thyromegaly, no palpable nodules  Cardiovascular: regular rhythm, no murmurs, distal pulses palpable, bilateral lower extremities edema, left foot brace  Respiratory: chest clear, no rales, no rhonchi  Neurologic: Absent knee reflexes, no resting tremor  Psychiatric: affect and judgment normal   Skin: No abdominal striae, no hirsutism or acne    DATA REVIEW    TSH   Date Value Ref Range Status   11/05/2021 1.33 0.40 - 4.00 mU/L Final   06/30/2021 191.68 (H) 0.40 - 4.00 mU/L Final     T4 Total   Date Value Ref Range Status   02/28/2011 7.6 4.7 - 13.3 ug/dl Final     T4 Free   Date Value Ref Range Status   06/30/2021 0.67 (L) 0.76 - 1.46 ng/dL Final     Free T4   Date Value Ref Range Status   11/05/2021 1.15 0.76 - 1.46 ng/dL Final       ASSESSMENT/PLAN:     1.  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  The patient has her thyroid hormone levels checked monthly through her neurology clinic.  Recent labs revealed hypothyroidism, although the patient reports taking levothyroxine correctly and her weight has not changed.  The dose of levothyroxine was appropriately increased to 100 mcg daily and the plan is to recheck the thyroid hormone levels in 5 weeks.    2.  Osteoporosis  The diagnosis is based on the prior history of femoral  fractures after falling from a standing height, in 2009 and 2018.  The DEXA scan confirmed very low T-scores.  Risk factors for osteoporosis identified: Postmenopausal status, sedentary lifestyle due to MS, treatment with PPI, hyperthyroidism, smoking.  Screening for celiac disease was negative.   From 6/2020 until 2021, the patient was medicated with Fosamax (for ~1 year).  She received one dose of Reclast in 2/22.  Recommendations:  Follow-up BMP, albumin, vitamin D and PTH levels today.  Adjust the dose of vitamin D based on the lab results. She takes a fairly high dose of vitamin D and we discussed about the risk of toxicity and side effects from vitamin D overdose.  Of note that the vitamin D level is high when checked with our assay, not consistent with the lab results from her neurology clinic.  This might be explained by a difference in assay methodology.  The plan is to schedule a second dose of Reclast in February.  Reminded the patient to maintain a good hydration the day of infusion.    2.  Borderline cortisol on the dexamethasone suppression test from December 2021  Salivary cortisol was unreliable due to mucosal bleeding.  Unable to check urinary cortisol given the impaired GFR.  Clinically, the patient does not endorse definite signs or symptoms suggestive of Cushing syndrome.   We are going to continue to monitor clinically.    4. Thyroid nodules, with no ultrasound features suggestive of malignancy  Clinical exam today was unremarkable.    Orders Placed This Encounter   Procedures     T4 free     TSH     25 Hydroxyvitamin D2 and D3     Albumin level     Basic metabolic panel     Phosphorus     Parathyroid Hormone Intact     43 minutes spent on the date of the encounter doing chart review, history and exam, documentation and further activities as noted above.    Neli Bean MD

## 2023-01-30 NOTE — NURSING NOTE
"Chief Complaint   Patient presents with     Thyroid Problem     Thyroid Disease     Vital signs:      BP: 138/76               Weight:  (cannot stand)  Estimated body mass index is 19.34 kg/m  as calculated from the following:    Height as of 3/31/22: 1.746 m (5' 8.74\").    Weight as of 3/31/22: 59 kg (130 lb).        "

## 2023-01-31 NOTE — PROGRESS NOTES
Problem: Occupational Therapy  Goal: Occupational Therapy Goal  Description: Goals to be met by: 2/17/23     Patient will increase functional independence with ADLs by performing:    UE Dressing with Supervision.  LE Dressing with Supervision and Assistive Devices as needed.  Grooming while standing at sink with Supervision.  Toileting from bedside commode with Supervision for hygiene and clothing management.   Toilet transfer to bedside commode with Supervision.  Upper extremity exercise program x10 reps per handout, with supervision.    Outcome: Ongoing, Progressing      Letter sent with the following information:    It was nice to see you in clinic yesterday.  Your hemoglobin is much better (up to 9.6 from 7.4) but still lower than I'd like.  Please continue your ferrous sulfate (iron pills) for another 2 months, then we'll check your lab tests again.  Most likely at that point you will be able to stop your iron pills, or at least cut down on them.

## 2023-02-05 LAB
DEPRECATED CALCIDIOL+CALCIFEROL SERPL-MC: <119 UG/L (ref 20–75)
VITAMIN D2 SERPL-MCNC: <5 UG/L
VITAMIN D3 SERPL-MCNC: 114 UG/L

## 2023-02-07 NOTE — RESULT ENCOUNTER NOTE
As suspected, the vitamin D level is significantly elevated.  This explains the high calcium level.  I recommend to stop taking the 5000 units vitamin D daily.  The vitamin D you take from the daily calcium supplements and the multivitamin should be enough.  You do not need extra vitamin D.  The thyroid hormone level has significantly improved.  Since the dose of levothyroxine was recently increased, on January 20, I recommended to have it rechecked mid-March, to confirm the dose is correct.  If this is done through your neurology clinic, please contact me with the results.  The other lab results are stable/normal.

## 2023-02-10 DIAGNOSIS — N63.25 UNSPECIFIED LUMP IN THE LEFT BREAST, OVERLAPPING QUADRANTS: ICD-10-CM

## 2023-02-10 DIAGNOSIS — D05.12 DUCTAL CARCINOMA IN SITU (DCIS) OF LEFT BREAST: Primary | ICD-10-CM

## 2023-02-13 DIAGNOSIS — D05.12 DUCTAL CARCINOMA IN SITU (DCIS) OF LEFT BREAST: Primary | ICD-10-CM

## 2023-02-13 DIAGNOSIS — N63.25 UNSPECIFIED LUMP IN THE LEFT BREAST, OVERLAPPING QUADRANTS: ICD-10-CM

## 2023-02-18 ENCOUNTER — HEALTH MAINTENANCE LETTER (OUTPATIENT)
Age: 68
End: 2023-02-18

## 2023-02-24 ENCOUNTER — TELEPHONE (OUTPATIENT)
Dept: ENDOCRINOLOGY | Facility: CLINIC | Age: 68
End: 2023-02-24
Payer: MEDICARE

## 2023-03-09 ENCOUNTER — LAB (OUTPATIENT)
Dept: LAB | Facility: CLINIC | Age: 68
End: 2023-03-09
Payer: MEDICARE

## 2023-03-09 DIAGNOSIS — N39.0 RECURRENT URINARY TRACT INFECTION: ICD-10-CM

## 2023-03-09 DIAGNOSIS — N18.30 CKD (CHRONIC KIDNEY DISEASE) STAGE 3, GFR 30-59 ML/MIN (H): ICD-10-CM

## 2023-03-09 DIAGNOSIS — E78.5 HYPERLIPIDEMIA WITH TARGET LDL LESS THAN 130: ICD-10-CM

## 2023-03-09 DIAGNOSIS — Z79.899 NEED FOR PROPHYLACTIC CHEMOTHERAPY: ICD-10-CM

## 2023-03-09 DIAGNOSIS — Z13.220 SCREENING FOR HYPERLIPIDEMIA: ICD-10-CM

## 2023-03-09 LAB
ALBUMIN UR-MCNC: NEGATIVE MG/DL
ALT SERPL W P-5'-P-CCNC: 9 U/L (ref 10–35)
APPEARANCE UR: CLEAR
AST SERPL W P-5'-P-CCNC: 17 U/L (ref 10–35)
BILIRUB SERPL-MCNC: 0.2 MG/DL
BILIRUB UR QL STRIP: NEGATIVE
CHOLEST SERPL-MCNC: 216 MG/DL
COLOR UR AUTO: ABNORMAL
CREAT SERPL-MCNC: 1.06 MG/DL (ref 0.51–0.95)
CREAT UR-MCNC: 33.5 MG/DL
ERYTHROCYTE [DISTWIDTH] IN BLOOD BY AUTOMATED COUNT: 12.8 % (ref 10–15)
GFR SERPL CREATININE-BSD FRML MDRD: 57 ML/MIN/1.73M2
GLUCOSE UR STRIP-MCNC: NEGATIVE MG/DL
HCT VFR BLD AUTO: 28.3 % (ref 35–47)
HDLC SERPL-MCNC: 59 MG/DL
HGB BLD-MCNC: 9.4 G/DL (ref 11.7–15.7)
HGB UR QL STRIP: NEGATIVE
KETONES UR STRIP-MCNC: NEGATIVE MG/DL
LDLC SERPL CALC-MCNC: 128 MG/DL
LEUKOCYTE ESTERASE UR QL STRIP: ABNORMAL
MCH RBC QN AUTO: 30.7 PG (ref 26.5–33)
MCHC RBC AUTO-ENTMCNC: 33.2 G/DL (ref 31.5–36.5)
MCV RBC AUTO: 93 FL (ref 78–100)
MICROALBUMIN UR-MCNC: <12 MG/L
MICROALBUMIN/CREAT UR: NORMAL MG/G{CREAT}
NITRATE UR QL: NEGATIVE
NONHDLC SERPL-MCNC: 157 MG/DL
PH UR STRIP: 7 [PH] (ref 5–7)
PLATELET # BLD AUTO: 177 10E3/UL (ref 150–450)
RBC # BLD AUTO: 3.06 10E6/UL (ref 3.8–5.2)
RBC URINE: 7 /HPF
SP GR UR STRIP: 1.01 (ref 1–1.03)
SQUAMOUS EPITHELIAL: 1 /HPF
T4 FREE SERPL-MCNC: 1.84 NG/DL (ref 0.9–1.7)
TRIGL SERPL-MCNC: 145 MG/DL
TSH SERPL DL<=0.005 MIU/L-ACNC: 0.09 UIU/ML (ref 0.3–4.2)
UROBILINOGEN UR STRIP-MCNC: NORMAL MG/DL
WBC # BLD AUTO: 3.6 10E3/UL (ref 4–11)
WBC URINE: 23 /HPF
YEAST #/AREA URNS HPF: ABNORMAL /HPF

## 2023-03-09 PROCEDURE — 81001 URINALYSIS AUTO W/SCOPE: CPT | Performed by: PATHOLOGY

## 2023-03-09 PROCEDURE — 84460 ALANINE AMINO (ALT) (SGPT): CPT | Performed by: PATHOLOGY

## 2023-03-09 PROCEDURE — 80061 LIPID PANEL: CPT | Performed by: PATHOLOGY

## 2023-03-09 PROCEDURE — 84439 ASSAY OF FREE THYROXINE: CPT | Performed by: PATHOLOGY

## 2023-03-09 PROCEDURE — 85027 COMPLETE CBC AUTOMATED: CPT | Performed by: PATHOLOGY

## 2023-03-09 PROCEDURE — 82565 ASSAY OF CREATININE: CPT | Performed by: PATHOLOGY

## 2023-03-09 PROCEDURE — 84450 TRANSFERASE (AST) (SGOT): CPT | Performed by: PATHOLOGY

## 2023-03-09 PROCEDURE — 36415 COLL VENOUS BLD VENIPUNCTURE: CPT | Performed by: PATHOLOGY

## 2023-03-09 PROCEDURE — 87086 URINE CULTURE/COLONY COUNT: CPT | Performed by: FAMILY MEDICINE

## 2023-03-09 PROCEDURE — 84443 ASSAY THYROID STIM HORMONE: CPT | Performed by: PATHOLOGY

## 2023-03-09 PROCEDURE — 82570 ASSAY OF URINE CREATININE: CPT | Performed by: FAMILY MEDICINE

## 2023-03-09 PROCEDURE — 82247 BILIRUBIN TOTAL: CPT | Performed by: PATHOLOGY

## 2023-03-10 LAB — BACTERIA UR CULT: NORMAL

## 2023-03-17 ENCOUNTER — OFFICE VISIT (OUTPATIENT)
Dept: FAMILY MEDICINE | Facility: CLINIC | Age: 68
End: 2023-03-17
Payer: MEDICARE

## 2023-03-17 VITALS
TEMPERATURE: 96.8 F | HEART RATE: 67 BPM | WEIGHT: 149 LBS | SYSTOLIC BLOOD PRESSURE: 123 MMHG | DIASTOLIC BLOOD PRESSURE: 72 MMHG | BODY MASS INDEX: 26.4 KG/M2 | HEIGHT: 63 IN | OXYGEN SATURATION: 99 %

## 2023-03-17 DIAGNOSIS — B00.9 HSV (HERPES SIMPLEX VIRUS) INFECTION: ICD-10-CM

## 2023-03-17 DIAGNOSIS — Z86.19 HISTORY OF THRUSH: ICD-10-CM

## 2023-03-17 DIAGNOSIS — F33.1 MODERATE EPISODE OF RECURRENT MAJOR DEPRESSIVE DISORDER (H): ICD-10-CM

## 2023-03-17 DIAGNOSIS — Z00.00 ENCOUNTER FOR MEDICARE ANNUAL WELLNESS EXAM: Primary | ICD-10-CM

## 2023-03-17 DIAGNOSIS — Z00.00 ROUTINE GENERAL MEDICAL EXAMINATION AT A HEALTH CARE FACILITY: ICD-10-CM

## 2023-03-17 DIAGNOSIS — G47.39 TREATMENT-EMERGENT CENTRAL SLEEP APNEA: ICD-10-CM

## 2023-03-17 DIAGNOSIS — R10.13 DYSPEPSIA: ICD-10-CM

## 2023-03-17 DIAGNOSIS — I10 ESSENTIAL HYPERTENSION, BENIGN: ICD-10-CM

## 2023-03-17 DIAGNOSIS — Z87.891 PERSONAL HISTORY OF TOBACCO USE: ICD-10-CM

## 2023-03-17 DIAGNOSIS — N39.0 FREQUENT UTI: ICD-10-CM

## 2023-03-17 DIAGNOSIS — B37.81 CANDIDA ESOPHAGITIS (H): ICD-10-CM

## 2023-03-17 DIAGNOSIS — Z23 NEED FOR SHINGLES VACCINE: ICD-10-CM

## 2023-03-17 DIAGNOSIS — K21.9 GASTROESOPHAGEAL REFLUX DISEASE WITHOUT ESOPHAGITIS: ICD-10-CM

## 2023-03-17 DIAGNOSIS — E89.0 POSTABLATIVE HYPOTHYROIDISM: ICD-10-CM

## 2023-03-17 DIAGNOSIS — G35 MS (MULTIPLE SCLEROSIS) (H): ICD-10-CM

## 2023-03-17 DIAGNOSIS — R13.10 PAINFUL SWALLOWING: ICD-10-CM

## 2023-03-17 DIAGNOSIS — N18.31 STAGE 3A CHRONIC KIDNEY DISEASE (H): ICD-10-CM

## 2023-03-17 PROBLEM — T30.0 THERMAL BURN: Status: RESOLVED | Noted: 2019-05-04 | Resolved: 2023-03-17

## 2023-03-17 PROCEDURE — 99215 OFFICE O/P EST HI 40 MIN: CPT | Mod: 25 | Performed by: FAMILY MEDICINE

## 2023-03-17 PROCEDURE — G0296 VISIT TO DETERM LDCT ELIG: HCPCS | Performed by: FAMILY MEDICINE

## 2023-03-17 PROCEDURE — G0009 ADMIN PNEUMOCOCCAL VACCINE: HCPCS | Performed by: FAMILY MEDICINE

## 2023-03-17 PROCEDURE — G0438 PPPS, INITIAL VISIT: HCPCS | Performed by: FAMILY MEDICINE

## 2023-03-17 PROCEDURE — 90677 PCV20 VACCINE IM: CPT | Performed by: FAMILY MEDICINE

## 2023-03-17 RX ORDER — DULOXETIN HYDROCHLORIDE 20 MG/1
20 CAPSULE, DELAYED RELEASE ORAL DAILY
Qty: 90 CAPSULE | Refills: 3 | Status: SHIPPED | OUTPATIENT
Start: 2023-03-17 | End: 2024-03-19

## 2023-03-17 RX ORDER — DULOXETIN HYDROCHLORIDE 60 MG/1
CAPSULE, DELAYED RELEASE ORAL
Qty: 90 CAPSULE | Refills: 3 | Status: ON HOLD | OUTPATIENT
Start: 2023-03-17 | End: 2023-11-30

## 2023-03-17 RX ORDER — ARMODAFINIL 200 MG/1
200 TABLET ORAL
Status: ON HOLD | COMMUNITY
Start: 2023-03-03 | End: 2023-11-30

## 2023-03-17 RX ORDER — NITROFURANTOIN 25; 75 MG/1; MG/1
100 CAPSULE ORAL DAILY
Qty: 90 CAPSULE | Refills: 3 | Status: ON HOLD | OUTPATIENT
Start: 2023-03-17 | End: 2023-11-30

## 2023-03-17 RX ORDER — ACYCLOVIR 400 MG/1
400 TABLET ORAL EVERY 12 HOURS
Qty: 180 TABLET | Refills: 3 | Status: SHIPPED | OUTPATIENT
Start: 2023-03-17 | End: 2024-06-17

## 2023-03-17 RX ORDER — LEVOTHYROXINE SODIUM 100 UG/1
100 TABLET ORAL
Qty: 90 TABLET | Refills: 1
Start: 2023-03-17 | End: 2023-07-06

## 2023-03-17 RX ORDER — HYDROCHLOROTHIAZIDE 12.5 MG/1
12.5 TABLET ORAL EVERY MORNING
Qty: 90 TABLET | Refills: 3 | Status: ON HOLD | OUTPATIENT
Start: 2023-03-17 | End: 2024-01-06

## 2023-03-17 RX ORDER — FAMOTIDINE 40 MG/1
40 TABLET, FILM COATED ORAL AT BEDTIME
Qty: 90 TABLET | Refills: 3 | Status: SHIPPED | OUTPATIENT
Start: 2023-03-17 | End: 2024-04-02

## 2023-03-17 ASSESSMENT — ENCOUNTER SYMPTOMS
PARESTHESIAS: 0
WEAKNESS: 1
HEMATURIA: 0
DYSURIA: 0
CONSTIPATION: 1
SHORTNESS OF BREATH: 0
NAUSEA: 0
FREQUENCY: 0
BREAST MASS: 0
COUGH: 0
HEMATOCHEZIA: 0
DIARRHEA: 0
HEADACHES: 0
SORE THROAT: 0
EYE PAIN: 1
CHILLS: 1
JOINT SWELLING: 1
ABDOMINAL PAIN: 0
ARTHRALGIAS: 0
DIZZINESS: 0
PALPITATIONS: 0
FEVER: 0
MYALGIAS: 0
NERVOUS/ANXIOUS: 1

## 2023-03-17 ASSESSMENT — PATIENT HEALTH QUESTIONNAIRE - PHQ9
SUM OF ALL RESPONSES TO PHQ QUESTIONS 1-9: 10
10. IF YOU CHECKED OFF ANY PROBLEMS, HOW DIFFICULT HAVE THESE PROBLEMS MADE IT FOR YOU TO DO YOUR WORK, TAKE CARE OF THINGS AT HOME, OR GET ALONG WITH OTHER PEOPLE: SOMEWHAT DIFFICULT
SUM OF ALL RESPONSES TO PHQ QUESTIONS 1-9: 10

## 2023-03-17 ASSESSMENT — ACTIVITIES OF DAILY LIVING (ADL)
CURRENT_FUNCTION: BATHING REQUIRES ASSISTANCE
CURRENT_FUNCTION: PREPARING MEALS REQUIRES ASSISTANCE
CURRENT_FUNCTION: HOUSEWORK REQUIRES ASSISTANCE
CURRENT_FUNCTION: LAUNDRY REQUIRES ASSISTANCE
CURRENT_FUNCTION: TRANSPORTATION REQUIRES ASSISTANCE

## 2023-03-17 ASSESSMENT — PAIN SCALES - GENERAL: PAINLEVEL: NO PAIN (0)

## 2023-03-17 NOTE — PROGRESS NOTES
"SUBJECTIVE:   Marylee is a 67 year old who presents for Preventive Visit.  Patient has been advised of split billing requirements and indicates understanding: Yes  Are you in the first 12 months of your Medicare coverage?  No    Healthy Habits:     In general, how would you rate your overall health?  Fair    Frequency of exercise:  2-3 days/week    Duration of exercise:  Less than 15 minutes    Do you usually eat at least 4 servings of fruit and vegetables a day, include whole grains    & fiber and avoid regularly eating high fat or \"junk\" foods?  No    Taking medications regularly:  Yes    Medication side effects:  Other    Ability to successfully perform activities of daily living:  Transportation requires assistance, preparing meals requires assistance, housework requires assistance, bathing requires assistance and laundry requires assistance    Home Safety:  No safety concerns identified    Hearing Impairment:  Difficulty following a conversation in a noisy restaurant or crowded room    In the past 6 months, have you been bothered by leaking of urine? Yes    In general, how would you rate your overall mental or emotional health?  Good      PHQ-2 Total Score: 2    Additional concerns today:  No    67 year old well known to me - thyroid update since last visit when we canaged from 75 to 100  TSH   Date Value Ref Range Status   03/09/2023 0.09 (L) 0.30 - 4.20 uIU/mL Final   11/05/2021 1.33 0.40 - 4.00 mU/L Final   06/30/2021 191.68 (H) 0.40 - 4.00 mU/L Final       Saw sleep doctor - it's not MARYLU, it's more of an MS sleep apnea (treatment emergent central sleep apnea)  and changed from standard cpap to EPAP and they gave nuvigil.  They started 150 mg; 3 days left and then wants to switch to 250.  First day felt really hgih.  That settled down.  More level.  Now back to daytime sleeping.  So now going to try higher dose.      Valtrex - one of more expensive drugs.  Good RX - $80  Goes through [facility]    Needs " refills, multiple medications    Miralax every other day gave her too much pooping (twice a day)    Breast - mammogram up to date.  Following with breast specialist.    Swallowing: spicy feeling all the way down.    Have you ever done Advance Care Planning? (For example, a Health Directive, POLST, or a discussion with a medical provider or your loved ones about your wishes): No, advance care planning information given to patient to review.  Patient declined advance care planning discussion at this time.       Fall risk  Fallen 2 or more times in the past year?: No  Any fall with injury in the past year?: No    Cognitive Screening   1) Repeat 3 items (Leader, Season, Table)    2) Clock draw: NORMAL  3) 3 item recall: Recalls 3 objects  Results: 3 items recalled: COGNITIVE IMPAIRMENT LESS LIKELY    Mini-CogTM Copyright S Radha. Licensed by the author for use in [facility]; reprinted with permission (yvette@[email or web address]). All rights reserved.      Do you have sleep apnea, excessive snoring or daytime drowsiness?: yes    Reviewed and updated as needed this visit by clinical staff   Tobacco  Allergies  Meds  Problems  Med Hx  Surg Hx  Fam Hx          Reviewed and updated as needed this visit by Provider   Tobacco  Allergies  Meds  Problems  Med Hx  Surg Hx  Fam Hx         Social History     Tobacco Use     Smoking status: Former     Packs/day: 0.25     Types: Cigarettes     Quit date: 2022     Years since quittin.2     Smokeless tobacco: Former     Tobacco comments:     22 smoking 1 cigarette/day   Substance Use Topics     Alcohol use: Yes     Alcohol/week: 0.0 standard drinks     Comment: occasional          Alcohol Use 3/17/2023   Prescreen: >3 drinks/day or >7 drinks/week? No               Current providers sharing in care for this patient include:   Patient Care Team:  Carolina Pulliam MD as PCP - General (Family Medicine)  Jose Valadez MD as MD  (Orthopedics)  Neli Bean MD as Assigned Endocrinology Provider  Amanda Liu MD as MD (Surgery)  Maru Tipton NP as Referring Physician (Nurse Practitioner - Family)  Sarah Giraldo, HEIDY as Specialty Care Coordinator (Hematology & Oncology)  Amanda Liu MD as Assigned Surgical Provider  Vilma Morales MD as Assigned Cancer Care Provider  Carolina Pulliam MD as Assigned PCP    The following health maintenance items are reviewed in Epic and correct as of today:  Health Maintenance   Topic Date Due     ZOSTER IMMUNIZATION (1 of 2) Never done     LUNG CANCER SCREENING  06/06/2020     COVID-19 Vaccine (3 - Moderna risk series) 02/21/2022     INFLUENZA VACCINE (1) 09/01/2022     BMP  07/30/2023     PHQ-9  09/17/2023     ANNUAL REVIEW OF HM ORDERS  01/20/2024     MAMMO SCREENING  02/03/2024     LIPID  03/09/2024     MICROALBUMIN  03/09/2024     TSH W/FREE T4 REFLEX  03/09/2024     HEMOGLOBIN  03/09/2024     MEDICARE ANNUAL WELLNESS VISIT  03/17/2024     FALL RISK ASSESSMENT  03/17/2024     DTAP/TDAP/TD IMMUNIZATION (3 - Td or Tdap) 11/04/2024     COLORECTAL CANCER SCREENING  01/26/2027     ADVANCE CARE PLANNING  03/17/2028     DEXA  11/05/2036     HEPATITIS C SCREENING  Completed     DEPRESSION ACTION PLAN  Completed     Pneumococcal Vaccine: 65+ Years  Completed     URINALYSIS  Completed     IPV IMMUNIZATION  Aged Out     MENINGITIS IMMUNIZATION  Aged Out     PAP  Discontinued           FHS-7:   Breast CA Risk Assessment (FHS-7) 2/3/2022   Did any of your first-degree relatives have breast or ovarian cancer? No   Did any of your relatives have bilateral breast cancer? No   Did any man in your family have breast cancer? No   Did any woman in your family have breast and ovarian cancer? No   Did any woman in your family have breast cancer before age 50 y? No   Do you have 2 or more relatives with breast and/or ovarian cancer? No   Do you have 2 or more relatives with breast and/or  "bowel cancer? No       Mammogram Screening: Recommended mammography every 1-2 years with patient discussion and risk factor consideration  Pertinent mammograms are reviewed under the imaging tab.    Review of Systems   Constitutional: Positive for chills. Negative for fever.   HENT: Negative for congestion, ear pain, hearing loss and sore throat.    Eyes: Positive for pain and visual disturbance.   Respiratory: Negative for cough and shortness of breath.    Cardiovascular: Positive for peripheral edema. Negative for chest pain and palpitations.   Gastrointestinal: Positive for constipation. Negative for abdominal pain, diarrhea, hematochezia and nausea.   Breasts:  Positive for tenderness. Negative for breast mass and discharge.   Genitourinary: Negative for dysuria, frequency, genital sores, hematuria, pelvic pain, urgency, vaginal bleeding and vaginal discharge.   Musculoskeletal: Positive for joint swelling. Negative for arthralgias and myalgias.   Skin: Negative for rash.   Neurological: Positive for weakness. Negative for dizziness, headaches and paresthesias.   Psychiatric/Behavioral: Negative for mood changes. The patient is nervous/anxious.          OBJECTIVE:   /72 (BP Location: Right arm, Patient Position: Sitting, Cuff Size: Adult Regular)   Pulse 67   Temp 96.8  F (36  C) (Temporal)   Ht 1.6 m (5' 3\")   Wt 67.6 kg (149 lb)   LMP  (LMP Unknown)   SpO2 99%   BMI 26.39 kg/m   Estimated body mass index is 26.39 kg/m  as calculated from the following:    Height as of this encounter: 1.6 m (5' 3\").    Weight as of this encounter: 67.6 kg (149 lb).  Physical Exam  GENERAL: healthy, alert and no distress  Wheelchair bound    Diagnostic Test Results:  Labs reviewed in Epic    ASSESSMENT / PLAN:   Marylee was seen today for physical.    Diagnoses and all orders for this visit:    Encounter for Medicare annual wellness exam    Routine general medical examination at a [facility]" facility  Comments:  discussed lung cancer screen; ordered  utd on other screening  discussed shingrix vaccine; she'll do at pharmacy  declines other covid/flu    Postablative hypothyroidism  Comments:  Adjusted 6 wks ago from 75 to 100  Now overcorrected  Will adjust levothyroxine to 100 6 days a week  Recheck TSH 6 wks lab only  Orders:  -     **TSH with free T4 reflex FUTURE 2mo; Future  -     levothyroxine (SYNTHROID/LEVOTHROID) 100 MCG tablet; Take 1 tablet (100 mcg) by mouth six times a week Recheck TSH with lab only visit before next refill    Treatment-emergent central sleep apnea  Comments:  reviewed last sleep note  using EPAP rather than CPAP  getting good sleep  Started Nuvigil for daytime sleepiness  Finding right dose    MS (multiple sclerosis) (H)  Comments:  follows with dr. garcia neurology  last notes reviewed    HSV (herpes simplex virus) infection  Comments:  Valtrex expensive   needs to continue suppressant meds as on immunosuppresants for MS  Switch to acyclovir 400 mg BID (Can increase to 800 BID if needed)  Orders:  -     acyclovir (ZOVIRAX) 400 MG tablet; Take 1 tablet (400 mg) by mouth every 12 hours    Stage 3a chronic kidney disease (H)  and   Essential hypertension, benign  Comments:  well controlled on low dose hctz 12.5 mg  refilled  stable and utd on labs  Next visit - discuss switch to low dose ace or arb, ran out of time today  Orders:  -     hydrochlorothiazide (HYDRODIURIL) 12.5 MG tablet; Take 1 tablet (12.5 mg) by mouth every morning    Moderate episode of recurrent major depressive disorder (H)  Comments:  Doing okay on Cymbalta 80 mg  Stable  PHQ9 up to date  Refilled  Orders:  -     DULoxetine (CYMBALTA) 20 MG capsule; Take 1 capsule (20 mg) by mouth daily Take along with 60 mg for a total of 80 mg  -     DULoxetine (CYMBALTA) 60 MG capsule; TAKE 1 CAPSULE BY MOUTH EVERY DAY    Gastroesophageal reflux disease without esophagitis  Comments:  Normal EGD 2018.  doing well on  famotidine 40 mg nightly  refilled  Orders:  -     famotidine (PEPCID) 40 MG tablet; Take 1 tablet (40 mg) by mouth At Bedtime    Frequent UTI  Comments:  nitrofurantoin daily as preventative   refilled  Orders:  -     nitroFURantoin macrocrystal-monohydrate (MACROBID) 100 MG capsule; Take 1 capsule (100 mg) by mouth daily    Personal history of tobacco use  Comments:  discussed; qualifies and thinks would like to do lung cancer screen  ordered  Orders:  -     Prof fee: Shared Decision Making for Lung Cancer Screening  -     CT Chest Lung Cancer Scrn Low Dose wo; Future    Painful swallowing  Dyspepsia  Candida esophagitis (H)  and   History of thrush  Comments:  repeat EGD  ordered  has had in past and was thrush; also does have smoking hx so repeat endoscopy good idea  Orders:  -     Adult GI  Referral - Procedure Only; Future    Need for shingles vaccine  Comments:  discused  will do at pharmacy    Other orders  -     Pneumococcal 20 Valent Conjugate (PCV20)  -     PRIMARY CARE FOLLOW-UP SCHEDULING; Future  -     PRIMARY CARE FOLLOW-UP SCHEDULING; Future  -     PRIMARY CARE FOLLOW-UP SCHEDULING; Future          Patient has been advised of split billing requirements and indicates understanding: Yes    Hossein Mathews: This patient needs to be on Valtrex twice a day as she is on immunosuppressant.  It's expensive, even with good rx.  Any ideas of ways to get it less expensively?  My other idea is to switch to acyclovir.  Thanks!  Dr. Carolina Pulliam MD / [facility]      COUNSELING:  Reviewed preventive health counseling, as reflected in patient instructions        She reports that she quit smoking about 14 months ago. Her smoking use included cigarettes. She smoked an average of .25 packs per day. She has quit using smokeless tobacco.      Appropriate preventive services were discussed with this patient, including applicable screening as appropriate for cardiovascular disease, diabetes,  osteopenia/osteoporosis, and glaucoma.  As appropriate for age/gender, discussed screening for colorectal cancer, prostate cancer, breast cancer, and cervical cancer. Checklist reviewing preventive services available has been given to the patient.    Reviewed patients plan of care and provided an AVS. The Basic Care Plan (routine screening as documented in Health Maintenance) for Marylee meets the Care Plan requirement. This Care Plan has been established and reviewed with the Patient and spouse.        I spent a total of 59 minutes on the day of the visit.    50 minutes of today's visit was spent outside of medicare wellness exam.     Time spent doing chart review, history and exam, documentation and further activities per the note      Carolina Pulliam MD  [facility]    Identified Health Risks:  Answers for HPI/ROS submitted by the patient on 3/17/2023  If you checked off any problems, how difficult have these problems made it for you to do your work, take care of things at home, or get along with other people?: Somewhat difficult  PHQ9 TOTAL SCORE: 10      Lung Cancer Screening Shared Decision Making Visit     Marylee Woods, a 67 year old female, is eligible for lung cancer screening    History   Smoking Status     Former     Packs/day: 0.25     Types: Cigarettes     Quit date: 1/1/2022   Smokeless Tobacco     Former       I have discussed with patient the risks and benefits of screening for lung cancer with low-dose CT.     The risks include:    radiation exposure: one low dose chest CT has as much ionizing radiation as about 15 chest x-rays, or 6 months of background radiation living in [place]      false positives: most findings/nodules are NOT cancer, but some might still require additional diagnostic evaluation, including biopsy    over-diagnosis: some slow growing cancers that might never have been clinically significant will be detected and treated unnecessarily     The  benefit of early detection of lung cancer is contingent upon adherence to annual screening or more frequent follow up if indicated.     Furthermore, to benefit from screening, Marylee must be willing and able to undergo diagnostic procedures, if indicated. Although no specific guide is available for determining severity of comorbidities, it is reasonable to withhold screening in patients who have greater mortality risk from other diseases.     We did discuss that the best way to prevent lung cancer is to not smoke.    Some patients may value a numeric estimation of lung cancer risk when evaluating if lung cancer screening is right for them, here is one calculator:    ShouldIScreen    The patient was provided with suggestions to help her develop a healthy physical lifestyle.  The patient was counseled and encouraged to consider modifying their diet and eating habits. She was provided with information on recommended healthy diet options.  The patient reports that she has difficulty with activities of daily living. I have asked that the patient make a follow up appointment in 6 months weeks where this issue will be further evaluated and addressed.  The patient was provided with written information regarding signs of hearing loss.  Information on urinary incontinence and treatment options given to patient.  The patient s PHQ-9 score is consistent with moderate depression. She was provided with information regarding depression and was advised to schedule a follow up appointment in 6 months weeks to further address this issue.

## 2023-03-17 NOTE — PATIENT INSTRUCTIONS
Preventive Health Recommendations    See your health care provider every year to    Review health changes.     Discuss preventive care.      Review your medicines if your doctor has prescribed any.      You no longer need a yearly Pap test unless you've had an abnormal Pap test in the past 10 years. If you have vaginal symptoms, such as bleeding or discharge, be sure to talk with your provider about a Pap test.      Every 1 to 2 years, have a mammogram.  If you are over 69, talk with your health care provider about whether or not you want to continue having screening mammograms.      Every 10 years, have a colonoscopy. Or, have a yearly FIT test (stool test). These exams will check for colon cancer.       Have a cholesterol test every 5 years, or more often if your doctor advises it.       Have a diabetes test (fasting glucose) every three years. If you are at risk for diabetes, you should have this test more often.       At age 65, have a bone density scan (DEXA) to check for osteoporosis (brittle bone disease).    Shots:    Get a flu shot each year.    Get a tetanus shot every 10 years.    Talk to your doctor about your pneumonia vaccines. There are now two you should receive - Pneumovax (PPSV 23) and Prevnar (PCV 13).    Talk to your pharmacist about the shingles vaccine.    Talk to your doctor about the hepatitis B vaccine.    Nutrition:     Eat at least 5 servings of fruits and vegetables each day.      Eat whole-grain bread, whole-wheat pasta and brown rice instead of white grains and rice.      Get adequate about Calcium and Vitamin D.     Lifestyle    Exercise at least 150 minutes a week (30 minutes a day, 5 days a week). This will help you control your weight and prevent disease.      Limit alcohol to one drink per day.      No smoking.       Wear sunscreen to prevent skin cancer.       See your dentist twice a year for an exam and cleaning.      See your eye doctor every 1 to 2 years to screen for  conditions such as glaucoma, macular degeneration, cataracts, etc.    Personalized Prevention Plan  You are due for the preventive services outlined below.  Your care team is available to assist you in scheduling these services.  If you have already completed any of these items, please share that information with your care team to update in your medical record.    Health Maintenance Due   Topic Date Due     Zoster (Shingles) Vaccine (1 of 2) Never done     LUNG CANCER SCREENING  06/06/2020     Pneumococcal Vaccine (3 - PCV) 10/23/2020     Annual Wellness Visit  11/20/2021     COVID-19 Vaccine (3 - Moderna risk series) 02/21/2022     Flu Vaccine (1) 09/01/2022     Lung Cancer Screening   Frequently Asked Questions  If you are at high-risk for lung cancer, getting screened with low-dose computed tomography (LDCT) every year can help save your life. This handout offers answers to some of the most common questions about lung cancer screening. If you have other questions, please call [phone] ([phone]).     What is it?  Lung cancer screening uses special X-ray technology to create an image of your lung tissue. The exam is quick and easy and takes less than 10 seconds. We don t give you any medicine or use any needles. You can eat before and after the exam. You don t need to change your clothes as long as the clothing on your chest doesn t contain metal. But, you do need to be able to hold your breath for at least 6 seconds during the exam.    What is the goal of lung cancer screening?  The goal of lung cancer screening is to save lives. Many times, lung cancer is not found until a person starts having physical symptoms. Lung cancer screening can help detect lung cancer in the earliest stages when it may be easier to treat.    Who should be screened for lung cancer?  We suggest lung cancer screening for anyone who is at high-risk for lung cancer. You are in the high-risk group if you:      are between the  ages of 55 and 79, and    have smoked at least 1 pack of cigarettes a day for 20 or more years, and    still smoke or have quit within the past 15 years.    However, if you have a new cough or shortness of breath, you should talk to your doctor before being screened.    Why does it matter if I have symptoms?  Certain symptoms can be a sign that you have a condition in your lungs that should be checked and treated by your doctor. These symptoms include fever, chest pain, a new or changing cough, shortness of breath that you have never felt before, coughing up blood or unexplained weight loss. Having any of these symptoms can greatly affect the results of lung cancer screening.       Should all smokers get an LDCT lung cancer screening exam?  It depends. Lung cancer screening is for a very specific group of men and women who have a history of heavy smoking over a long period of time (see  Who should be screened for lung cancer  above).  I am in the high-risk group, but have been diagnosed with cancer in the past. Is LDCT lung cancer screening right for me?  In some cases, you should not have LDCT lung screening, such as when your doctor is already following your cancer with CT scan studies. Your doctor will help you decide if LDCT lung screening is right for you.  Do I need to have a screening exam every year?  Yes. If you are in the high-risk group described earlier, you should get an LDCT lung cancer screening exam every year until you are 79, or are no longer willing or able to undergo screening and possible procedures to diagnose and treat lung cancer.  How effective is LDCT at preventing death from lung cancer?  Studies have shown that LDCT lung cancer screening can lower the risk of death from lung cancer by 20 percent in people who are at high-risk.  What are the risks?  There are some risks and limitations of LDCT lung cancer screening. We want to make sure you understand the risks and benefits, so please let  us know if you have any questions. Your doctor may want to talk with you more about these risks.    Radiation exposure: As with any exam that uses radiation, there is a very small increased risk of cancer. The amount of radiation in LDCT is small--about the same amount a person would get from a mammogram. Your doctor orders the exam when he or she feels the potential benefits outweigh the risks.    False negatives: No test is perfect, including LDCT. It is possible that you may have a medical condition, including lung cancer, that is not found during your exam. This is called a false negative result.    False positives and more testing: LDCT very often finds something in the lung that could be cancer, but in fact is not. This is called a false positive result. False positive tests often cause anxiety. To make sure these findings are not cancer, you may need to have more tests. These tests will be done only if you give us permission. Sometimes patients need a treatment that can have side effects, such as a biopsy. For more information on false positives, see  What can I expect from the results?     Findings not related to lung cancer: Your LDCT exam also takes pictures of areas of your body next to your lungs. In a very small number of cases, the CT scan will show an abnormal finding in one of these areas, such as your kidneys, adrenal glands, liver or thyroid. This finding may not be serious, but you may need more tests. Your doctor can help you decide what other tests you may need, if any.  What can I expect from the results?  About 1 out of 4 LDCT exams will find something that may need more tests. Most of the time, these findings are lung nodules. Lung nodules are very small collections of tissue in the lung. These nodules are very common, and the vast majority--more than 97 percent--are not cancer (benign). Most are normal lymph nodes or small areas of scarring from past infections.  But, if a small lung nodule  is found to be cancer, the cancer can be cured more than 90 percent of the time. To know if the nodule is cancer, we may need to get more images before your next yearly screening exam. If the nodule has suspicious features (for example, it is large, has an odd shape or grows over time), we will refer you to a specialist for further testing.  Will my doctor also get the results?  Yes. Your doctor will get a copy of your results.  Is it okay to keep smoking now that there s a cancer screening exam?  No. Tobacco is one of the strongest cancer-causing agents. It causes not only lung cancer, but other cancers and cardiovascular (heart) diseases as well. The damage caused by smoking builds over time. This means that the longer you smoke, the higher your risk of disease. While it is never too late to quit, the sooner you quit, the better.  Where can I find help to quit smoking?  The best way to prevent lung cancer is to stop smoking. If you have already quit smoking, congratulations and keep it up! For help on quitting smoking, please call [facility] at 6-694-QUITNOW (1-693.213.9357) or the [employer] at 1-147.407.9869 to find local resources near you.  One-on-one health coaching:  If you d prefer to work individually with a health care provider on tobacco cessation, we offer:      Medication Therapy Management:  Our specially trained pharmacists work closely with you and your doctor to help you quit smoking.  Call [phone] or [phone] (toll free).    Patient Education   Personalized Prevention Plan  You are due for the preventive services outlined below.  Your care team is available to assist you in scheduling these services.  If you have already completed any of these items, please share that information with your care team to update in your medical record.  Health Maintenance Due   Topic Date Due     Zoster (Shingles) Vaccine (1 of 2) Never done     LUNG CANCER SCREENING  06/06/2020     COVID-19  Vaccine (3 - Moderna risk series) 02/21/2022     Flu Vaccine (1) 09/01/2022     Your Health Risk Assessment indicates you feel you are not in good health    A healthy lifestyle helps keep the body fit and the mind alert. It helps protect you from disease, helps you fight disease, and helps prevent chronic disease (disease that doesn't go away) from getting worse. This is important as you get older and begin to notice twinges in muscles and joints and a decline in the strength and stamina you once took for granted. A healthy lifestyle includes good healthcare, good nutrition, weight control, recreation, and regular exercise. Avoid harmful substances and do what you can to keep safe. Another part of a healthy lifestyle is stay mentally active and socially involved.    Good healthcare     Have a wellness visit every year.     If you have new symptoms, let us know right away. Don't wait until the next checkup.     Take medicines exactly as prescribed and keep your medicines in a safe place. Tell us if your medicine causes problems.   Healthy diet and weight control     Eat 3 or 4 small, nutritious, low-fat, high-fiber meals a day. Include a variety of fruits, vegetables, and whole-grain foods.     Make sure you get enough calcium in your diet. Calcium, vitamin D, and exercise help prevent osteoporosis (bone thinning).     If you live alone, try eating with others when you can. That way you get a good meal and have company while you eat it.     Try to keep a healthy weight. If you eat more calories than your body uses for energy, it will be stored as fat and you will gain weight.     Recreation   Recreation is not limited to sports and team events. It includes any activity that provides relaxation, interest, enjoyment, and exercise. Recreation provides an outlet for physical, mental, and social energy. It can give a sense of worth and achievement. It can help you stay healthy.    Mental Exercise and Social  Involvement  Mental and emotional health is as important as physical health. Keep in touch with friends and family. Stay as active as possible. Continue to learn and challenge yourself.   Things you can do to stay mentally active are:    Learn something new, like a foreign language or musical instrument.     Play SCRABBLE or do crossword puzzles. If you cannot find people to play these games with you at home, you can play them with others on your computer through the Internet.     Join a games club--anything from card games to chess or checkers or lawn bowling.     Start a new hobby.     Go back to school.     Volunteer.     Read.   Keep up with world events.    Understanding USDA MyPlate  The USDA has guidelines to help you make healthy food choices. These are called MyPlate. MyPlate shows the food groups that make up healthy meals using the image of a place setting. Before you eat, think about the healthiest choices for what to put on your plate or in your cup or bowl. To learn more about building a healthy plate, visit www.choosemyplate.gov.     The food groups    Fruits. Any fruit or 100% fruit juice counts as part of the Fruit Group. Fruits may be fresh, canned, frozen, or dried, and may be whole, cut-up, or pureed. Make 1/2 of your plate fruits and vegetables.    Vegetables. Any vegetable or 100% vegetable juice counts as a member of the Vegetable Group. Vegetables may be fresh, frozen, canned, or dried. They can be served raw or cooked and may be whole, cut-up, or mashed. Make 1/2 of your plate fruits and vegetables.    Grains. All foods made from grains are part of the Grains Group. These include wheat, rice, oats, cornmeal, and barley. Grains are often used to make foods such as bread, pasta, oatmeal, cereal, tortillas, and grits. Grains should be no more than 1/4 of your plate. At least half of your grains should be whole grains.    Protein. This group includes meat, poultry, seafood, beans and peas, eggs,  processed soy products (such as tofu), nuts (including nut butters), and seeds. Make protein choices no more than 1/4 of your plate. Meat and poultry choices should be lean or low fat.    Dairy. The Dairy Group includes all fluid milk products and foods made from milk that contain calcium, such as yogurt and cheese. (Foods that have little calcium, such as cream, butter, and cream cheese, are not part of this group.) Most dairy choices should be low-fat or fat-free.    Oils. Oils aren't a food group, but they do contain essential nutrients. However it's important to watch your intake of oils. These are fats that are liquid at room temperature. They include canola, corn, olive, soybean, vegetable, and sunflower oil. Foods that are mainly oil include mayonnaise, certain salad dressings, and soft margarines. You likely already get your daily oil allowance from the foods you eat.  Things to limit  Eating healthy also means limiting these things in your diet:    Salt (sodium). Many processed foods have a lot of sodium. To keep sodium intake down, eat fresh vegetables, meats, poultry, and seafood when possible. Purchase low-sodium, reduced-sodium, or no-salt-added food products at the store. And don't add salt to your meals at home. Instead, season them with herbs and spices such as dill, oregano, cumin, and paprika. Or try adding flavor with lemon or lime zest and juice.    Saturated fat. Saturated fats are most often found in animal products such as beef, pork, and chicken. They are often solid at room temperature, such as butter. To reduce your saturated fat intake, choose leaner cuts of meat and poultry. And try healthier cooking methods such as grilling, broiling, roasting, or baking. For a simple lower-fat swap, use plain nonfat yogurt instead of mayonnaise when making potato salad or macaroni salad.    Added sugars. These are sugars added to foods. They are in foods such as ice cream, candy, soda, fruit drinks,  sports drinks, energy drinks, cookies, pastries, jams, and syrups. Cut down on added sugars by sharing sweet treats with a family member or friend. You can also choose fruit for dessert, and drink water or other unsweetened beverages.  [employer] last reviewed this educational content on 6/1/2020 2000-2022 [employer]. All rights reserved. This information is not intended as a substitute for professional medical care. Always follow your healthcare professional's instructions.        Activities of Daily Living    Your Health Risk Assessment indicates you have difficulties with activities of daily living such as housework, bathing, preparing meals, taking medication, etc. Please make a follow up appointment for us to address this issue in more detail.    Signs of Hearing Loss  Hearing loss is a problem shared by many people. In fact, it's one of the most common health problems, particularly as people age. Most people aged 65 and older have some hearing loss. By age 80, almost everyone does. Hearing loss often occurs slowly over the years. So, you may not realize your hearing has gotten worse.   When sudden hearing loss occurs, it's important to contact your healthcare provider right away. Your provider will do a medical exam and a hearing exam as soon as possible. This is to help find the cause and type of your sudden hearing loss. Based on your diagnosis, your healthcare provider will discuss possible treatments.      Hearing much better with one ear can be a sign of hearing loss.     Have your hearing checked  Call your healthcare provider if you:     Have to strain to hear normal conversation    Have to watch other people s faces very carefully to follow what they re saying    Need to ask people to repeat what they ve said    Often misunderstand what people are saying    Turn the volume of the television or radio up so high that others complain    Feel that people are mumbling when they re talking to  you    Find that the effort to hear leaves you feeling tired and irritated    Notice, when using the phone, that you hear better with one ear than the other  [employer] last reviewed this educational content on 6/1/2022 2000-2022 [employer]. All rights reserved. This information is not intended as a substitute for professional medical care. Always follow your healthcare professional's instructions.          Urinary Incontinence, Female (Adult)   Urinary incontinence means loss of bladder control. This problem affects many women, especially as they get older. If you have incontinence, you may be embarrassed to ask for help. But know that this problem can be treated.   Types of Incontinence  There are different types of incontinence. Two of the main types are described here. You can have more than one type.     Stress incontinence. With this type, urine leaks when pressure (stress) is put on the bladder. This may happen when you cough, sneeze, or laugh. Stress incontinence most often occurs because the pelvic floor muscles that support the bladder and urethra are weak. This can happen after pregnancy and vaginal childbirth or a hysterectomy. It can also be due to excess body weight or hormone changes.    Urge incontinence (also called overactive bladder). With this type, a sudden urge to urinate is felt often. This may happen even though there may not be much urine in the bladder. The need to urinate often during the night is common. Urge incontinence most often occurs because of bladder spasms. This may be due to bladder irritation or infection. Damage to bladder nerves or pelvic muscles, constipation, and certain medicines can also lead to urge incontinence.  Treatment depends on the cause. Further evaluation is needed to find the type you have. This will likely include an exam and certain tests. Based on the results, you and your healthcare provider can then plan treatment. Until a diagnosis is  made, the home care tips below can help ease symptoms.   Home care    Do pelvic floor muscle exercises, if they are prescribed. The pelvic floor muscles help support the bladder and urethra. Many women find that their symptoms improve when doing special exercises that strengthen these muscles. To do the exercises, contract the muscles you would use to stop your stream of urine. But do this when you re not urinating. Hold for 10 seconds, then relax. Repeat 10 to 20 times in a row, at least 3 times a day. Your healthcare provider may give you other instructions for how to do the exercises and how often.    Keep a bladder diary. This helps track how often and how much you urinate over a set period of time. Bring this diary with you to your next visit with the provider. The information can help your provider learn more about your bladder problem.    Lose weight, if advised to by your provider. Extra weight puts pressure on the bladder. Your provider can help you create a weight-loss plan that s right for you. This may include exercising more and making certain diet changes.    Don't have foods and drinks that may irritate the bladder. These can include alcohol and caffeinated drinks.    Quit smoking. Smoking and other tobacco use can lead to a long-term (chronic) cough that strains the pelvic floor muscles. Smoking may also damage the bladder and urethra. Talk with your provider about treatments or methods you can use to quit smoking.    If drinking large amounts of fluid makes you have symptoms, you may be advised to limit your fluid intake. You may also be advised to drink most of your fluids during the day and to limit fluids at night.    If you re worried about urine leakage or accidents, you may wear absorbent pads to catch urine. Change the pads often. This helps reduce discomfort. It may also reduce the risk of skin or bladder infections.    Follow-up care  Follow up with your healthcare provider, or as directed.  "It may take some to find the right treatment for your problem. But healthy lifestyle changes can be made right away. These include such things as exercising on a regular basis, eating a healthy diet, losing weight (if needed), and quitting smoking. Your treatment plan may include special therapies or medicines. Certain procedures or surgery may also be options. Talk about any questions you have with your provider.   When to seek medical advice  Call the healthcare provider right away if any of these occur:    Fever of 100.4 F (38 C) or higher, or as directed by your provider    Bladder pain or fullness    Belly swelling    Nausea or vomiting    Back pain    Weakness, dizziness, or fainting  [employer] last reviewed this educational content on 1/1/2020 2000-2022 [employer]. All rights reserved. This information is not intended as a substitute for professional medical care. Always follow your healthcare professional's instructions.          Depression and Suicide in Older Adults  Nearly 2 million older adults in the [place] have some type of depression. Some of them even take their own lives. Yet depression among older adults is often ignored. Learning about the warning signs of depression may help spare a loved one needless pain. You may also save a life.   What is depression?  Depression is a common and serious illness. It affects the way you think and feel. It is not a normal part of aging. It is not a sign of weakness, a character flaw, or something you can \"snap out of.\" Most people with depression need treatment to get better. The most common symptom is a feeling of deep sadness. People who are depressed also may seem tired and listless. And nothing seems to give them pleasure. It s normal to grieve or be sad sometimes. But sadness lessens or passes with time. Depression rarely goes away or improves on its own. Other symptoms of depression are:     Sleeping more or less than normal    Eating more or " less than normal    Having headaches, stomachaches, or other pains that don t go away    Feeling nervous,  empty,  or worthless    Crying a lot    Thinking or talking about suicide or death    Loss of interest in activities previously enjoyed    Social isolation    Feeling confused or forgetful  What causes it?  The causes of depression aren t fully known. But it is thought to result from a complex blend of these factors:     Biochemistry. Certain chemicals in the brain play a role.    Genes. Depression does run in families.    Life stress. Life stresses can also trigger depression in some people. Older adults often face many stressors. These may include isolation, the death of friends or a spouse, health problems, and financial concerns.    Chronic health conditions. This includes diabetes, heart disease, or cancer. These can cause symptoms of depression. Medicine side effects can cause changes in thoughts and behaviors.  Giving support    Depressed people often may not want to ask for help. When they do, they may be ignored. Or they may get the wrong treatment. You can help by showing parents and older friends love and support. If they seem depressed, don t lecture the person or ignore the symptoms. Don't discount the symptoms as a  normal  part of aging. They are not. Get involved, listen, and show interest and support.   Help them understand that depression is a treatable illness. Tell them you can help them find the right treatment. Offer to go to their healthcare provider's appointment with them for support when the symptoms are discussed. With their approval, contact a local mental health center, social service agency, or hospital about services.   Helping at healthcare visits  You can be an advocate for them at healthcare appointments. Many older adults have chronic illnesses. Many of these can cause symptoms of depression. Medicine side effects can change thoughts and behaviors.   You can help make sure  that the healthcare provider looks at all of these factors. They should refer your family member or friend to a mental healthcare provider when needed. In some cases, untreated depression can lead to a misdiagnosis. A person may be diagnosed with a brain disorder such as dementia. If the healthcare provider does not take the issue of depression seriously, help your family member or friend find another provider.   Asking about self-harm thoughts  If you think an older person you care about could be suicidal, ask,  Have you thought about suicide?  Most people will tell you the truth. If they say yes, they may already have a plan for how and when they will attempt it. Find out as much as you can. The more detailed the plan, and the easier it is to carry out, the more danger the person is in right now. Tell the person you are there for them and you do not want them to get harmed. Don't wait to get help for the person. Call the person's healthcare provider, local hospital, or emergency services.   Call 988 in a crisis   Never leave the person alone. A person who is actively suicidal needs crisis care right away. They need constant supervision. Never leave the person out of sight. Call 988 Tell the crisis counselor you need help for a person who is thinking about suicide. The counselor will help you get the right level of crisis help. You may be advised to take the person to the nearest emergency room.   The National Suicide Prevention Lifeline is available at 988, or 114-778-JNZZ (563-962-9435), or www.suicidepreventionlifeline.org. When you call or text 988, you will be connected to trained counselors who are part of the National Suicide Prevention Lifeline network. An online chat option is also available. Lifeline is free and available 24/7.   To learn more    National Suicide Prevention Lifeline at www.suicidepreventionlifeline.org  or 634-230-DROC (364-657-1679)    National [place] on Mental Illness at www.anna.org   or 939-025-FAVD (509-691-0249)    Mental Health Maggy at www.[facility].org  or 631-302-6578    National [place] of Mental Health at www.nimh.nih.gov  or 724-285-8651    Rafael last reviewed this educational content on 7/1/2022 2000-2022 [employer]. All rights reserved. This information is not intended as a substitute for professional medical care. Always follow your healthcare professional's instructions.

## 2023-03-17 NOTE — Clinical Note
Hossein Mathews: This patient needs to be on Valtrex twice a day as she is on immunosuppressant.  It's expensive, even with good rx.  Any ideas of ways to get it less expensively?  My other idea is to switch to acyclovir. Thanks! Dr. Carolina Pulliam MD / [facility]

## 2023-03-17 NOTE — PROGRESS NOTES
Can you call patient and let her know msg from [facility] below?  I'll switch her from valacyclovir to acyclovir - should be cheaper.  Let us know if not!  Thanks,  Dr. Carolina Pulliam MD / [facility]      Sarah Ogden, [facility]  Carolina Pulliam MD; Bisi Zuñiga, [facility]  Dr. Pulliam,     Unfortunately no since there's no special  coupon for Valtrex and the pharmacy should already be giving her the cheaper generic version. Yes switching to acyclovir would be more affordable.     Sarah Ogden, PharmD, BCACP   Medication Therapy Management Provider, [facility]

## 2023-03-20 NOTE — PROGRESS NOTES
I called and left message stating a medication was changed to be more cost effective and directed her to call pharmacy    SAEID SmithN RN  [facility]

## 2023-03-24 ENCOUNTER — PATIENT OUTREACH (OUTPATIENT)
Dept: ONCOLOGY | Facility: CLINIC | Age: 68
End: 2023-03-24

## 2023-03-24 ENCOUNTER — TRANSCRIBE ORDERS (OUTPATIENT)
Dept: ONCOLOGY | Facility: CLINIC | Age: 68
End: 2023-03-24

## 2023-03-24 ENCOUNTER — INFUSION THERAPY VISIT (OUTPATIENT)
Dept: INFUSION THERAPY | Facility: CLINIC | Age: 68
End: 2023-03-24
Attending: INTERNAL MEDICINE
Payer: MEDICARE

## 2023-03-24 ENCOUNTER — ANCILLARY PROCEDURE (OUTPATIENT)
Dept: CT IMAGING | Facility: CLINIC | Age: 68
End: 2023-03-24
Attending: FAMILY MEDICINE
Payer: MEDICARE

## 2023-03-24 ENCOUNTER — TELEPHONE (OUTPATIENT)
Dept: FAMILY MEDICINE | Facility: CLINIC | Age: 68
End: 2023-03-24

## 2023-03-24 VITALS
OXYGEN SATURATION: 100 % | SYSTOLIC BLOOD PRESSURE: 119 MMHG | TEMPERATURE: 98.6 F | DIASTOLIC BLOOD PRESSURE: 75 MMHG | HEART RATE: 75 BPM

## 2023-03-24 DIAGNOSIS — M81.0 OSTEOPOROSIS OF MULTIPLE SITES: Primary | ICD-10-CM

## 2023-03-24 DIAGNOSIS — R91.8 LUNG NODULES: Primary | ICD-10-CM

## 2023-03-24 DIAGNOSIS — Z87.891 PERSONAL HISTORY OF TOBACCO USE: ICD-10-CM

## 2023-03-24 DIAGNOSIS — R91.8 LUNG NODULES: ICD-10-CM

## 2023-03-24 DIAGNOSIS — R91.8 PULMONARY NODULES: Primary | ICD-10-CM

## 2023-03-24 PROCEDURE — 258N000003 HC RX IP 258 OP 636: Performed by: INTERNAL MEDICINE

## 2023-03-24 PROCEDURE — 96365 THER/PROPH/DIAG IV INF INIT: CPT

## 2023-03-24 PROCEDURE — 71271 CT THORAX LUNG CANCER SCR C-: CPT | Mod: GC | Performed by: RADIOLOGY

## 2023-03-24 PROCEDURE — 250N000011 HC RX IP 250 OP 636: Performed by: INTERNAL MEDICINE

## 2023-03-24 PROCEDURE — 999N000127 HC STATISTIC PERIPHERAL IV START W US GUIDANCE

## 2023-03-24 RX ORDER — ZOLEDRONIC ACID 5 MG/100ML
5 INJECTION, SOLUTION INTRAVENOUS ONCE
Status: COMPLETED | OUTPATIENT
Start: 2023-03-24 | End: 2023-03-24

## 2023-03-24 RX ORDER — ZOLEDRONIC ACID 5 MG/100ML
5 INJECTION, SOLUTION INTRAVENOUS ONCE
Status: CANCELLED
Start: 2023-03-24

## 2023-03-24 RX ADMIN — ZOLEDRONIC ACID 5 MG: 0.05 INJECTION, SOLUTION INTRAVENOUS at 09:08

## 2023-03-24 RX ADMIN — SODIUM CHLORIDE 50 ML: 9 INJECTION, SOLUTION INTRAVENOUS at 09:12

## 2023-03-24 NOTE — TELEPHONE ENCOUNTER
[facility] Radiology Lung Cancer Screening CT result notification:     LDCT/Lung Cancer Screening CT Exam date: 3/24/2023  Radiologist Impression AND Recommendations:   1. ACR Assessment Category (2022):  Lung-RADS Category 4B. Very  Suspicious.  New nodule superior segment of right lower lobe.  Differential includes neoplasm, infection or inflammatory.     Recommendation:  Lung-RADS Category 4B. Very Suspicious.  Recommendation:  Chest CT with or without contrast, PET/CT and/or  tissue  sampling depending on the  *probability of malignancy and  comorbidities. PET/CT may be used when there is a ? 8 mm(? 268.1 mm3)  solid  component. For new large nodules that develop on an annual repeat  screening CT, a 1 month LDCT may be recommended to address potentially  infectious or inflammatory conditions    Pertinent Findings:  Nodules:   The largest and/or fastest of these nodule(s) are as follows:     - 14 x 8  mm solid nodule in the right lower lobe superior segment on  series:  4 image:  89.     - 4  mm solid nodule in the left lower lobe on series:  4 image:  133.        Ordering Provider: Johnson, Sara Hope, MD Marylee Woods did receive the remaining radiology results from their PCP.   Message to Patient from PCP,     Hello Marylee,     The provider who ordered your tests is currently out of the office. We wanted to let you know that your results have been reviewed.     Your lung cancer screening test showed a large nodule that is very concerning. I have put in a referral for you to see a lung nodule specialist, urgently. They will call you to schedule.     Your provider may get back to you with further information or additional next steps for the plan.     Take care,  Reny Spence PA-C    , regarding chest CT results (copied and pasted):       Lung Nodule Program Protocol recommendation [Pertaining to lung nodules]:      Lung RADS 4B/4X Protocol:  Results RN to notify Patient of  "results/recommendations, place referral to [facility] Lung Nodule clinic within 2 to 4 weeks.  o Results RN place referral to [facility] Lung nodule program [9023 - Adult Oncology/Hematology  referral  with reason for referral \"Interventional pulmonology (Lung Nodule)\"] and Patient will be scheduled to see the Lung Nodule Specialist within 2 to 4 weeks (Yes/No/NA): YES  o Relay information to Patient:  [facility] Scheduling team, [phone], will be calling Patient within 1 week to schedule appt - appt only M-F. (Yes/No/NA): YES  o Results RN routed telephone encounter to [facility] Lung Cancer Screening CNS team (Yes/No/NA): YES  o Results RN will inform the patient that a team of specialist will be discussing their case at the weekly lung nodule conference Friday morning and someone from the clinic will be contacting the Patient with the next steps. (Yes/No/NA): YES  o If Patient has questions or concerns, they are welcome to call the Lung Nodule clinic at [phone], press option 2 to speak with the nurse (Yes/No/NA): YES    RN communicated the lung nodule finding to the patient (Yes/No):  YES  The patient had the following questions: What is a lung nodule?  Many questions writer was unable to answer - discussed that these may be good questions for pulmonologist - encouraged writing down any questions and follow up with lung nodule nurse, PCP, pulmonologist on questions.  We reviewed what a lung nodule is, the lung nodules seen in CT, referral recommendations, follow up with PCP on any addition findings on CT, contacting lung nodule clinic as needed for scheduling or questions.  Correct letter sent as per [facility] Lung nodule protocol (Yes/No):  YES  Did Patient have any CT's of chest previous? (Yes/No/NA) NO  If no comparisons used, inquire if patient has had any chest CT's in the past and if so, request priors.    If scheduling LDCT only, RN will contact Image Scheduling Team  Hours " available (all sites below):  Mon-Fri 7A to 7P; Sat 7A to 3:30P.  No schedulers available on Sunday.    [facility] ([place] and [place]): [phone]    North region ([place], [place], [place]): [phone]    South region ([place] and [place]): [phone]    East region ([facility]): [phone]    Lung Nodule Clinics    Pulomonary (Lung Care) Clinic at [facility]  Floor 2, Check-In D, [address] [address] [place], [place]  Hours: Mon - Fri 7:00 AM to 5:00 PM    [facility] [place] at [facility]   Floor 2, [address], [place]  [phone]  Hours: Mon - Fri 7:00 AM - 7:00 PM;  Sat 8:00 AM to 12:00 PM (noon)    [facility] [place]  [address], [place], [place], [phone]  Hours: Mon - Fri 8:00 AM - 4:30 PM    [facility]  [address], [place] [address]  Hours: Mon-Thurs 7:00 AM- 6:30 PM;  Friday 7:00 AM- 5:30 PM    [facility] [place]  [address] [place] [address] [phone]  Hours: Mon -Thurs 7:00 AM-7:00 PM;  Friday 12:00 PM (noon) - 4 PM      Roberth Whipple RN  [facility] [employer] [place]  Lung Nodule and Emergency Dept Lab Result RN  Ph# [phone]

## 2023-03-24 NOTE — PROGRESS NOTES
New Patient: Interventional Pulmonary (Lung nodule) Nurse Navigator Note    Referring provider: Reny Spence PA-CSphp [facility]     Referred to (specialty): Interventional Pulmonary (Lung nodule)    Requested provider (if applicable): n/a    Date Referral Received: 3/24/2023    Evaluation for :  Lung nodule-Lung-RADs Category 4B nodule on lung cancer screening, smoker     Clinical History (per Nurse review of records provided):    **BOOK MARKED**    3/24/23   CT Chest Lung Cancer Scrn Low Dose wo   Impression:   1. ACR Assessment Category (2022):  Lung-RADS Category 4B. Very  Suspicious.  New nodule superior segment of right lower lobe. Differential includes neoplasm, infection or inflammatory.     Recommendation:  Lung-RADS Category 4B. Very Suspicious.  Recommendation:  Chest CT with or without contrast, PET/CT and/or tissue sampling depending on the  *probability of malignancy and comorbidities. PET/CT may be used when there is a ? 8 mm(? 268.1 mm3)  Solid component. For new large nodules that develop on an annual repeat screening CT, a 1 month LDCT may be recommended to address potentially infectious or inflammatory conditions      2. Significant Incidental Finding(s):  Category S: Yes.  a. Postradiation scarring/fibrosis in the anterior lateral left upper lobe. Postradiation skin changes in the left breast.  b.  Mixed groundglass and consolidative opacities in the posteriorv aspect of the right upper lobe abutting the oblique fissure. Differential includes subsegmental atelectasis and infection or radiation change.     3. Avoidance of tobacco smoke is strongly advised. Please consider referral for smoking cessation to [facility] Medication Therapy Management (MTM) if clinically appropriate.        Records Location (Care Everywhere, Media, etc.): [facility]     Records Needed: none    Additional testing needed prior to consult: PFT's

## 2023-03-24 NOTE — PATIENT INSTRUCTIONS
Dear Marylee Woods    Thank you for choosing [facility] ([facility]) for your infusion.  The following information is a summary of our appointment as well as important reminders.      We look forward in seeing you on your next appointment here at [facility] ([facility]).  Please don t hesitate to call us at [phone] to reschedule any of your appointments or to speak with one of the [facility] registered nurses.  It was a pleasure taking care of you today.    Sincerely,    [facility]  [facility]  [address] [place] [address]  [address]  Phone:  [phone]

## 2023-03-24 NOTE — PROGRESS NOTES
Nursing Note  Marylee Woods [facility] for:   Chief Complaint   Patient presents with     Infusion     Zoledronic acid (reclast)     During today's [facility] appointment, orders from AZRA LA MD were completed.  Frequency: once    Progress note:  Patient identification verified by name and date of birth.  Assessment completed.  Vitals recorded in Doc Flowsheets.  Patient was provided with education regarding medication/procedure and possible side effects.  Patient verbalized understanding.     present during visit today: Not Applicable.    Treatment Conditions:   Creatinine   Date Value Ref Range Status   03/09/2023 1.06 (H) 0.51 - 0.95 mg/dL Final     Calcium   Date Value Ref Range Status   01/30/2023 10.0 8.8 - 10.2 mg/dL Final     Premedications: were not ordered.  Drug Waste Record: No  Infusion length and rate:  200 ml/hr., over 30 minutes  Labs: were not ordered for this appointment.  Vascular access: peripheral IV was placed by vascular access nurse.  Is the next appt scheduled? no    Post Infusion Assessment:  Patient tolerated infusion without incident.  Site patent and intact, free from redness, edema or discomfort.  No evidence of extravasations.  Access discontinued per protocol.     Discharge Plan:   Follow up plan of care with: ordering provider as scheduled.  Discharge instructions were reviewed with patient.  Patient/representative verbalized understanding of discharge instructions and all questions answered.  Patient discharged from [facility] in stable condition.    Noy Varma RN    Administrations This Visit     0.9% sodium chloride BOLUS     Admin Date  03/24/2023 Action  $New Bag Dose  50 mL Route  Intravenous Administered By  Noy Varma RN          zoledronic Acid (RECLAST) infusion 5 mg     Admin Date  03/24/2023 Action  $New Bag Dose  5 mg  Rate  200 mL/hr Route  Intravenous Administered By  Noy Varma RN                /75   Pulse 75   Temp 98.6  F (37  C) (Oral)   LMP  (LMP Unknown)   SpO2 100%

## 2023-03-27 ENCOUNTER — TELEPHONE (OUTPATIENT)
Dept: GASTROENTEROLOGY | Facility: CLINIC | Age: 68
End: 2023-03-27
Payer: MEDICARE

## 2023-03-27 NOTE — TELEPHONE ENCOUNTER
Screening Questions  BLUE  KIND OF PREP RED  LOCATION [review exclusion criteria] GREEN  SEDATION TYPE        Y Are you active on mychart?       DOUGLAS CUEVAS Ordering/Referring Provider?        BCBS What type of coverage do you have?      N Have you had a positive covid test in the last 14 days?     26.4 1. BMI  [BMI 40+ - review exclusion criteria]    Y  2. Are you able to give consent for your medical care? [IF NO,RN REVIEW]          N  3. Are you taking any prescription pain medications on a routine schedule   (ex narcotics: oxycodone, roxicodone, oxycontin,  and percocet)? [RN Review]          3a. EXTENDED PREP What kind of prescription?     N 4. Do you have any chemical dependencies such as alcohol, street drugs, or methadone?        **If yes 3- 5 , please schedule with MAC sedation.**          IF YES TO ANY 6 - 10 - HOSPITAL SETTING ONLY.     Y 6.   Do you need assistance transferring?     N 7.   Have you had a heart or lung transplant?    N 8.   Are you currently on dialysis?   N 9.   Do you use daily home oxygen?   N 10. Do you take nitroglycerin?   10a.  If yes, how often?     11. [FEMALES]   Are you currently pregnant?    11a.  If yes, how many weeks? [ Greater than 12 weeks, OR NEEDED]    N 12. Do you have Pulmonary Hypertension? *NEED PAC APPT AT [facility] w/ MAC*     N 13. [review exclusion criteria]  Do you have any implantable devices in your body (pacemaker, defib, LVAD)?    N 14. In the past 6 months, have you had any heart related issues including cardiomyopathy or heart attack?     14a.  If yes, did it require cardiac stenting if so when?     N 15. Have you had a stroke or Transient ischemic attack (TIA - aka  mini stroke ) within 6 months?      N 16. Do you have mod to severe Obstructive Sleep Apnea?  [Hospital only]    N 17. Do you have SEVERE AND UNCONTROLLED asthma? *NEED PAC APPT AT [facility] w/MAC*     18. Are you currently taking any blood thinners?     18a. No. Continue to 19.   18b.  "Yes/no Blood Thinner: No. Inform patient to \"follow up w/ ordering provider for bridging instructions.\"    N 19. Do you take the medication Phentermine?    19a. If yes, \"Hold for 7 days before procedure.  Please consult your prescribing provider if you have questions about holding this medication.\"     N  20. Do you have chronic kidney disease?      N  21. Do you have a diagnosis of diabetes?      23. Preferred LOCAL Pharmacy for Pre Prescription    [ LIST ONLY ONE PHARMACY]        [address] 0-[address]    - CLOSING REMINDERS -    Informed patient they will need an adult    Cannot take any type of public or medical transportation alone    Conscious Sedation- Needs  for 6 hours after the procedure       MAC/General-Needs  for 24 hours after procedure    Pre-Procedure Covid test to be completed [[facility] PCR Testing Required]    Confirmed Nurse will call to complete assessment       - SCHEDULING DETAILS -  Y Hospital Setting Required? If yes, what is the exclusion?: TRANSFER ASSISTANCE   RAAD  Surgeon    4/20/2023  Date of Procedure  Upper Endoscopy [EGD]  Type of Procedure Scheduled  [facility]- [facility] Location      MODERATE Sedation Type     NO PAC / Pre-op Required                 "

## 2023-03-30 ENCOUNTER — OFFICE VISIT (OUTPATIENT)
Dept: PULMONOLOGY | Facility: CLINIC | Age: 68
End: 2023-03-30
Payer: MEDICARE

## 2023-03-30 VITALS — SYSTOLIC BLOOD PRESSURE: 128 MMHG | DIASTOLIC BLOOD PRESSURE: 76 MMHG | OXYGEN SATURATION: 99 % | HEART RATE: 82 BPM

## 2023-03-30 DIAGNOSIS — R13.19 ESOPHAGEAL DYSPHAGIA: Primary | ICD-10-CM

## 2023-03-30 DIAGNOSIS — R91.8 PULMONARY NODULES: ICD-10-CM

## 2023-03-30 PROCEDURE — 99214 OFFICE O/P EST MOD 30 MIN: CPT | Performed by: INTERNAL MEDICINE

## 2023-03-30 NOTE — PROGRESS NOTES
LUNG NODULE & INTERVENTIONAL PULMONARY CLINIC  CLINICS & SURGERY CENTER, [facility], [facility]     Marylee Woods MRN# [id number]   Age: 67 year old YOB: 1955       Requesting Physician: Reny Spence PA-C  [address]  [place] [address]       Assessment and Plan:    1. New solitary pulmonary lung nodule(s). Given the characteristics on current/previous imaging and risk factors; I would classify this to be Low (<6%) risk for cancer.  3-month CT.  We discussed the possibility of an early CT but she is comfortable with 3 months.    I acknowledge the correctness of the measurements but I think the coronal views suggest an inflammatory cause.  Overall risk of cancer is very low.    2.  Dysphagia.  Oral phase is fine and no recollection of regurgitation or aspiration.  Nevertheless she is at risk for aspiration as a cause for her pulmonary issues.  She is having an endoscopy in a few weeks.           History:     Marylee Woods is a 67 year old female with sig h/o for multiple sclerosis, smoking who is here for evaluation/followup of lung nodule(s).  She does not have a lot of pulmonary symptoms.  She is a light smoker who is trying to cut down or quit.    MS - decades, relapsing remitting. Esophageal dysphagia - food gets stuck down low in the esophagus.    Sleep issues- excessive daytime sleepiness she is working on a BiPAP setting with a sleep doctor.    - My interpretation of the images relevant for this visit includes: RUL consolidation, RLL sup seg nodules              Past Medical History:      Past Medical History:   Diagnosis Date     Atypical ductal hyperplasia of left breast 02/2022     Depression      Gastro-oesophageal reflux disease      Graves disease      Multiple sclerosis (H)      Osteoporosis      Postablative hypothyroidism            Past Surgical History:      Past Surgical History:   Procedure Laterality Date     C/SECTION, LOW TRANSVERSE        COLONOSCOPY       COLONOSCOPY N/A 2017    Procedure: COMBINED COLONOSCOPY, SINGLE OR MULTIPLE BIOPSY/POLYPECTOMY BY BIOPSY;  Surgeon: Mayo Adknis MD, MD;  Location:  GI     ESOPHAGOSCOPY, GASTROSCOPY, DUODENOSCOPY (EGD), COMBINED  2017    Dr. Adkins [facility]     ESOPHAGOSCOPY, GASTROSCOPY, DUODENOSCOPY (EGD), COMBINED N/A 2017    Procedure: COMBINED ESOPHAGOSCOPY, GASTROSCOPY, DUODENOSCOPY (EGD), BIOPSY SINGLE OR MULTIPLE;  Surgeon: Mayo Adkins MD, MD;  Location: [facility] GI     HIP SURGERY      femur ortho surgery     LAPAROSCOPIC CHOLECYSTECTOMY  2014    Procedure: LAPAROSCOPIC CHOLECYSTECTOMY;  Surgeon: Randy Bailey MD;  Location:  [facility]     LUMPECTOMY BREAST Left 3/31/2022    Procedure: LEFT Radiofrequency Identification Seed-Localized Lumpectomy;  Surgeon: Amanda Liu MD;  Location: [facility] OR     OPEN REDUCTION INTERNAL FIXATION FEMUR DISTAL Left 2018    Procedure: OPEN REDUCTION INTERNAL FIXATION LEFT FEMUR DISTAL;  Surgeon: Jose Valadez MD;  [facility]: [facility]     OPEN REDUCTION INTERNAL FIXATION RODDING INTRAMEDULLARY TIBIA  2013    Procedure: OPEN REDUCTION INTERNAL FIXATION RODDING INTRAMEDULLARY TIBIA;;  Surgeon: Sajan Cast MD;  [facility]     ORTHOPEDIC SURGERY  2009    surgery right upper femur fx near hip          Social History:     Social History     Tobacco Use     Smoking status: Every Day     Packs/day: 0.25     Types: Cigarettes     Last attempt to quit: 2022     Years since quittin.2     Smokeless tobacco: Never   Substance Use Topics     Alcohol use: Yes     Alcohol/week: 0.0 standard drinks     Comment: occasional           Family History:     Family History   Problem Relation Age of Onset     Heart Disease Maternal Grandmother      Cancer Maternal Grandfather      Heart Disease Paternal Grandfather      Heart Disease Mother      Heart Disease Father      Diabetes No family hx of      Coronary Artery  Disease No family hx of      Hypertension No family hx of      Hyperlipidemia No family hx of      Cerebrovascular Disease No family hx of      Breast Cancer No family hx of      Colon Cancer No family hx of      Prostate Cancer No family hx of      Other Cancer No family hx of      Depression No family hx of      Anxiety Disorder No family hx of      Mental Illness No family hx of      Substance Abuse No family hx of      Anesthesia Reaction No family hx of      Asthma No family hx of      Osteoporosis No family hx of      Genetic Disorder No family hx of      Thyroid Disease No family hx of      Obesity No family hx of      Unknown/Adopted No family hx of            Allergies:      Allergies   Allergen Reactions     Amantadine      hallucinations     Budesonide      Symbicort Rash          Medications:     Current Outpatient Medications   Medication Sig     acetaminophen (TYLENOL) 500 MG tablet Take 1-2 tablets (500-1,000 mg) by mouth every 8 hours as needed for mild pain or fever (greater than 101 degrees)     acyclovir (ZOVIRAX) 400 MG tablet Take 1 tablet (400 mg) by mouth every 12 hours     armodafinil (NUVIGIL) 250 MG TABS tablet Take 1 tablet by mouth daily at 2 pm     baclofen (LIORESAL) 10 MG tablet 2 times daily Take 2 tablets (20 MG) by mouth every morning, 1 tablet (10 MG) by mouth daily in the afternoon as needed, and 3 tablets (30 MG) by mouth every evening before bedtime.     calcium carbonate 500 mg, elemental, (OSCAL 500) 1250 (500 Ca) MG TABS tablet Take 1 tablet by mouth daily     DULoxetine (CYMBALTA) 20 MG capsule Take 1 capsule (20 mg) by mouth daily Take along with 60 mg for a total of 80 mg     DULoxetine (CYMBALTA) 60 MG capsule TAKE 1 CAPSULE BY MOUTH EVERY DAY     famotidine (PEPCID) 40 MG tablet Take 1 tablet (40 mg) by mouth At Bedtime     ferrous sulfate (FEROSUL) 325 (65 Fe) MG tablet Take 325 mg by mouth daily (with breakfast)     gabapentin (NEURONTIN) 300 MG capsule Take 300 mg by  mouth 2 times daily Take 2 tablets (600 MG) by mouth 3-4 times daily.     hydrochlorothiazide (HYDRODIURIL) 12.5 MG tablet Take 1 tablet (12.5 mg) by mouth every morning     latanoprost (XALATAN) 0.005 % ophthalmic solution INSTILL 1 DROP INTO BOTH EYES EVERY DAY AS DIRECTED PT WILL NEED TO BE SEEN FOR FUTURE REFILLS     levothyroxine (SYNTHROID/LEVOTHROID) 100 MCG tablet Take 1 tablet (100 mcg) by mouth six times a week Recheck TSH with lab only visit before next refill     multivitamin w/minerals (THERA-VIT-M) tablet Take 1 tablet by mouth every evening     nitroFURantoin macrocrystal-monohydrate (MACROBID) 100 MG capsule Take 1 capsule (100 mg) by mouth daily     oxybutynin (DITROPAN-XL) 10 MG 24 hr tablet Take 1 tablet (10 mg) by mouth 2 times daily     No current facility-administered medications for this visit.          Review of Systems:     See HPI         Physical Exam:   /76 (BP Location: Left arm, Patient Position: Chair, Cuff Size: Adult Regular)   Pulse 82   LMP  (LMP Unknown)   SpO2 99%     Constitutional - looks well, in no apparent distress  Eyes - no redness or discharge  Respiratory -breathing appears comfortable. No wheeze or rhonchi.   Cardiac -- Normal rate, rhythm.   Skin - No appreciable discoloration or lesions (very limited exam)  Neurological - No apparent tremors. Speech fluent and articlate  Psychiatric - no signs of delirium or anxiety          Current Laboratory Data:   All laboratory and imaging data reviewed.    No results found. However, due to the size of the patient record, not all encounters were searched. Please check Results Review for a complete set of results.

## 2023-04-06 ENCOUNTER — TELEPHONE (OUTPATIENT)
Dept: GASTROENTEROLOGY | Facility: CLINIC | Age: 68
End: 2023-04-06

## 2023-04-06 NOTE — TELEPHONE ENCOUNTER
Patient scheduled for Upper endoscopy (EGD) on 4.20.23.     Discuss Covid policy.     Pre op exam needed? N/A    Arrival time: 1345. Procedure time 1445    Facility location: [facility]; [address] [place] [address], [address] Floor, [place] [address]    Sedation type: Conscious sedation     Anticoagulations? No    Electronic implanted devices? No    Diabetic? No    Indication for procedure: reflux, burning sensation esophagus in setting of MS      Pre visit planning completed.    Vanessa Betts RN  Endoscopy Procedure Pre Assessment RN

## 2023-04-10 NOTE — TELEPHONE ENCOUNTER
Attempted to contact patient regarding upcoming Upper endoscopy (EGD) procedure on 4.20.23 for pre assessment questions. No answer.     Left message to return call to [phone] #[phone]      Vanessa Betts RN  Endoscopy Procedure Pre Assessment RN

## 2023-04-12 NOTE — TELEPHONE ENCOUNTER
Pre assessment questions completed for upcoming Upper endoscopy (EGD) procedure scheduled on 4/20/2023    COVID policy reviewed.     Reviewed procedural arrival time 1345 and facility location [facility]; [address], [address], [place] [address]    Designated  policy reviewed. Instructed to have someone stay 6 hours post procedure.     Anticoagulation/blood thinners? No    Electronic implanted devices? No    Diabetic? No    Reviewed procedure prep instructions.     Patient verbalized understanding and had no questions or concerns at this time.    Malorie Zuniga RN  Endoscopy Procedure Pre Assessment RN

## 2023-04-20 ENCOUNTER — HOSPITAL ENCOUNTER (OUTPATIENT)
Facility: CLINIC | Age: 68
Discharge: HOME | End: 2023-04-20
Attending: INTERNAL MEDICINE | Admitting: INTERNAL MEDICINE
Payer: MEDICARE

## 2023-04-20 VITALS
DIASTOLIC BLOOD PRESSURE: 78 MMHG | SYSTOLIC BLOOD PRESSURE: 142 MMHG | RESPIRATION RATE: 18 BRPM | HEART RATE: 88 BPM | OXYGEN SATURATION: 100 %

## 2023-04-20 DIAGNOSIS — G35 MS (MULTIPLE SCLEROSIS) (H): Primary | ICD-10-CM

## 2023-04-20 LAB — UPPER GI ENDOSCOPY: NORMAL

## 2023-04-20 PROCEDURE — 88305 TISSUE EXAM BY PATHOLOGIST: CPT | Mod: TC | Performed by: INTERNAL MEDICINE

## 2023-04-20 PROCEDURE — 43239 EGD BIOPSY SINGLE/MULTIPLE: CPT | Performed by: INTERNAL MEDICINE

## 2023-04-20 PROCEDURE — 250N000011 HC RX IP 250 OP 636: Performed by: INTERNAL MEDICINE

## 2023-04-20 PROCEDURE — 999N000099 HC STATISTIC MODERATE SEDATION < 10 MIN: Performed by: INTERNAL MEDICINE

## 2023-04-20 PROCEDURE — 88305 TISSUE EXAM BY PATHOLOGIST: CPT | Mod: 26 | Performed by: PATHOLOGY

## 2023-04-20 RX ORDER — NALOXONE HYDROCHLORIDE 0.4 MG/ML
0.2 INJECTION, SOLUTION INTRAMUSCULAR; INTRAVENOUS; SUBCUTANEOUS
Status: CANCELLED | OUTPATIENT
Start: 2023-04-20

## 2023-04-20 RX ORDER — ONDANSETRON 2 MG/ML
4 INJECTION INTRAMUSCULAR; INTRAVENOUS EVERY 6 HOURS PRN
Status: CANCELLED | OUTPATIENT
Start: 2023-04-20

## 2023-04-20 RX ORDER — NALOXONE HYDROCHLORIDE 0.4 MG/ML
0.4 INJECTION, SOLUTION INTRAMUSCULAR; INTRAVENOUS; SUBCUTANEOUS
Status: CANCELLED | OUTPATIENT
Start: 2023-04-20

## 2023-04-20 RX ORDER — FENTANYL CITRATE 50 UG/ML
INJECTION, SOLUTION INTRAMUSCULAR; INTRAVENOUS PRN
Status: DISCONTINUED | OUTPATIENT
Start: 2023-04-20 | End: 2023-04-20 | Stop reason: HOSPADM

## 2023-04-20 RX ORDER — ONDANSETRON 4 MG/1
4 TABLET, ORALLY DISINTEGRATING ORAL EVERY 6 HOURS PRN
Status: CANCELLED | OUTPATIENT
Start: 2023-04-20

## 2023-04-20 RX ORDER — FLUMAZENIL 0.1 MG/ML
0.2 INJECTION, SOLUTION INTRAVENOUS
Status: CANCELLED | OUTPATIENT
Start: 2023-04-20 | End: 2023-04-21

## 2023-04-20 RX ORDER — PROCHLORPERAZINE MALEATE 5 MG
5 TABLET ORAL EVERY 6 HOURS PRN
Status: CANCELLED | OUTPATIENT
Start: 2023-04-20

## 2023-04-20 ASSESSMENT — ACTIVITIES OF DAILY LIVING (ADL): ADLS_ACUITY_SCORE: 39

## 2023-04-25 ENCOUNTER — TELEPHONE (OUTPATIENT)
Dept: FAMILY MEDICINE | Facility: CLINIC | Age: 68
End: 2023-04-25
Payer: MEDICARE

## 2023-04-25 NOTE — TELEPHONE ENCOUNTER
Patient calling in wanting to be seen for a cough    Cough has been going on for about a week. It has been keeping her up at night.     Mild fever at 99.4.     She says she coughs for three hours straight.     Does smoke cigarettes. Only had 1 today.     No shortness of breath.    She has not tested for covid yet, she does have home tests.     I advised her to please test now, and call us back if it is positive to discuss treatment and cancel appt for tomorrow. If negative, ok to come to appt with Maru Tipton that we scheduled for tomorrow    REVA Smith RN  [facility]

## 2023-04-26 ENCOUNTER — ANCILLARY PROCEDURE (OUTPATIENT)
Dept: GENERAL RADIOLOGY | Facility: CLINIC | Age: 68
End: 2023-04-26
Attending: NURSE PRACTITIONER
Payer: MEDICARE

## 2023-04-26 ENCOUNTER — OFFICE VISIT (OUTPATIENT)
Dept: FAMILY MEDICINE | Facility: CLINIC | Age: 68
End: 2023-04-26
Payer: MEDICARE

## 2023-04-26 VITALS
WEIGHT: 145.7 LBS | DIASTOLIC BLOOD PRESSURE: 74 MMHG | RESPIRATION RATE: 18 BRPM | OXYGEN SATURATION: 96 % | TEMPERATURE: 97.4 F | HEART RATE: 96 BPM | SYSTOLIC BLOOD PRESSURE: 138 MMHG | BODY MASS INDEX: 25.81 KG/M2

## 2023-04-26 DIAGNOSIS — R05.1 ACUTE COUGH: Primary | ICD-10-CM

## 2023-04-26 DIAGNOSIS — R05.1 ACUTE COUGH: ICD-10-CM

## 2023-04-26 DIAGNOSIS — J18.9 PNEUMONIA OF RIGHT LOWER LOBE DUE TO INFECTIOUS ORGANISM: ICD-10-CM

## 2023-04-26 PROCEDURE — 71046 X-RAY EXAM CHEST 2 VIEWS: CPT | Mod: TC | Performed by: RADIOLOGY

## 2023-04-26 PROCEDURE — 99214 OFFICE O/P EST MOD 30 MIN: CPT | Performed by: NURSE PRACTITIONER

## 2023-04-26 RX ORDER — CODEINE PHOSPHATE AND GUAIFENESIN 10; 100 MG/5ML; MG/5ML
1-2 SOLUTION ORAL
Qty: 120 ML | Refills: 0 | Status: ON HOLD | OUTPATIENT
Start: 2023-04-26 | End: 2023-11-18

## 2023-04-26 RX ORDER — AZITHROMYCIN 250 MG/1
TABLET, FILM COATED ORAL
Qty: 6 TABLET | Refills: 0 | Status: SHIPPED | OUTPATIENT
Start: 2023-04-26 | End: 2023-05-01

## 2023-04-26 ASSESSMENT — PATIENT HEALTH QUESTIONNAIRE - PHQ9
SUM OF ALL RESPONSES TO PHQ QUESTIONS 1-9: 11
10. IF YOU CHECKED OFF ANY PROBLEMS, HOW DIFFICULT HAVE THESE PROBLEMS MADE IT FOR YOU TO DO YOUR WORK, TAKE CARE OF THINGS AT HOME, OR GET ALONG WITH OTHER PEOPLE: SOMEWHAT DIFFICULT
SUM OF ALL RESPONSES TO PHQ QUESTIONS 1-9: 11

## 2023-04-26 NOTE — PROGRESS NOTES
"  Assessment & Plan     (R05.1) Acute cough  (primary encounter diagnosis)  Comment:   Plan: XR Chest 2 Views, guaiFENesin-codeine         (ROBITUSSIN AC) 100-10 MG/5ML solution        See below    (J18.9) Pneumonia of right lower lobe due to infectious organism  Comment:   Plan: amoxicillin-clavulanate (AUGMENTIN) 875-125 MG         tablet, azithromycin (ZITHROMAX) 250 MG tablet        Blunting of right costophrenic angle, will treat for possible RLQ pneumonia.  Follow up with PCP next week if no improvement, sooner if worsening.   Discussed smoking cessation.           36 minutes spent by me on the date of the encounter doing chart review, patient visit and documentation        Nicotine/Tobacco Cessation:  She reports that she has been smoking cigarettes. She has been smoking an average of .25 packs per day. She has never used smokeless tobacco.  Nicotine/Tobacco Cessation Plan:   Information offered: Patient not interested at this time      BMI:   Estimated body mass index is 25.81 kg/m  as calculated from the following:    Height as of 3/17/23: 1.6 m (5' 3\").    Weight as of this encounter: 66.1 kg (145 lb 11.2 oz).           Maru Tipton NP  [facility]    Subjective Marylee is a 67 year old, presenting for the following health issues:  URI        4/26/2023    10:03 AM   Additional Questions   Roomed by khushbu   Accompanied by      CHARLINE    History of Present Illness       Reason for visit:  Barking cough, especialy at night. Fever, highest being 100.3.Feeling unwell,weakness in body.  Symptom onset:  3-7 days ago  Symptoms include:  Barking cough, sleeping a lot, feeling unwell. Sore throat, tonsils sore, feel swollen.  Symptom intensity:  Severe  Symptom progression:  Staying the same  Had these symptoms before:  No    She eats 0-1 servings of fruits and vegetables daily.She consumes 1 sweetened beverage(s) daily.She exercises with enough effort to increase her heart rate " 9 or less minutes per day.  She exercises with enough effort to increase her heart rate 3 or less days per week.   She is taking medications regularly.    Today's PHQ-9         PHQ-9 Total Score: 11    PHQ-9 Q9 Thoughts of better off dead/self-harm past 2 weeks :   Not at all    How difficult have these problems made it for you to do your work, take care of things at home, or get along with other people: Somewhat difficult     She started with mild cold symptoms about 1.5-2 weeks ago.  She feels like her symptoms are worsening.  Coughing for several hours at night.  Temp last night 100.3.  She denies any shortness of breath.   Right maxillary sinus pressure.  She did a home COVID test last night, which was negative.             Review of Systems         Objective    Wt 66.1 kg (145 lb 11.2 oz)   LMP  (LMP Unknown)   BMI 25.81 kg/m    Body mass index is 25.81 kg/m .  Physical Exam   GENERAL: healthy, alert and no distress  HENT: ear canals and TM's normal, nose and mouth without ulcers or lesions  NECK: no adenopathy, no asymmetry, masses, or scars and thyroid normal to palpation  RESP: decreased breath sounds RUL  CV: regular rate and rhythm, normal S1 S2, no S3 or S4, no murmur, click or rub, no peripheral edema and peripheral pulses strong  PSYCH: mentation appears normal, affect normal/bright

## 2023-05-08 ENCOUNTER — PATIENT OUTREACH (OUTPATIENT)
Dept: ONCOLOGY | Facility: CLINIC | Age: 68
End: 2023-05-08
Payer: MEDICARE

## 2023-05-08 NOTE — PROGRESS NOTES
[facility]: Cancer Care                                                                                          Attempted to call patient today to discuss the cancer treatment summary that was prepared and sent via [employer]. Left a generic voice message requesting a call back at my direct phone number: [phone].    Destiney Ascencio RN BSN  Cancer Survivorship Coordinator

## 2023-05-15 NOTE — PROGRESS NOTES
[facility]: Cancer Care                                                                                          Called Marylee today to review the cancer treatment summary that was sent to her mychart. She states she did receive and review the information. We discussed the intent and purpose of the document today. She has no questions or survivorship needs at this time.     Destiney Ascencio RN BSN  Cancer Survivorship Coordinator

## 2023-06-01 ENCOUNTER — TRANSCRIBE ORDERS (OUTPATIENT)
Dept: OTHER | Age: 68
End: 2023-06-01

## 2023-06-01 DIAGNOSIS — G35 MULTIPLE SCLEROSIS (H): Primary | ICD-10-CM

## 2023-06-16 ENCOUNTER — ANCILLARY PROCEDURE (OUTPATIENT)
Dept: CT IMAGING | Facility: CLINIC | Age: 68
End: 2023-06-16
Attending: INTERNAL MEDICINE
Payer: MEDICARE

## 2023-06-16 DIAGNOSIS — R91.8 PULMONARY NODULES: ICD-10-CM

## 2023-06-16 PROCEDURE — 71250 CT THORAX DX C-: CPT | Mod: MF | Performed by: RADIOLOGY

## 2023-06-16 PROCEDURE — G1010 CDSM STANSON: HCPCS | Mod: GC | Performed by: RADIOLOGY

## 2023-06-22 ENCOUNTER — VIRTUAL VISIT (OUTPATIENT)
Dept: PULMONOLOGY | Facility: CLINIC | Age: 68
End: 2023-06-22
Payer: MEDICARE

## 2023-06-22 DIAGNOSIS — R91.8 PULMONARY NODULES: Primary | ICD-10-CM

## 2023-06-22 PROCEDURE — 99213 OFFICE O/P EST LOW 20 MIN: CPT | Mod: VID | Performed by: INTERNAL MEDICINE

## 2023-06-22 NOTE — PROGRESS NOTES
Virtual Visit Details    Type of service:  Video Visit   Video Start Time: 1408  Video End Time:1415    Originating Location (pt. Location): Home    Distant Location (provider location):  On-site  Platform used for Video Visit: [facility]     LUNG NODULE & INTERVENTIONAL PULMONARY CLINIC  CLINICS & SURGERY CENTER, [facility], [facility]     Marylee Woods MRN# [id number]   Age: 67 year old YOB: 1955       Requesting Physician: Reny Spence PA-C  [address] [place] [address]       Assessment and Plan:    1.  Lung nodule: Area of concern has completely resolved.  She can return to yearly lung cancer screening.  She can follow this up with her primary care doctor.    2.  Bacot use.  We discussed this.  She understands the goal is smoking cessation.  Unfortunately she had a very stressful family event smoking as a coping mechanism.  When she is ready to start cutting down again she will contact me or her primary care doctor.           History:     Marylee Woods is a 68-year-old woman with history of tobacco use who is here for follow-up of lung nodules.  She has had no changes in her health since her last visit.    - My interpretation of the images relevant for this visit includes: Resolution of previous nodules.           Past Medical History:      Past Medical History:   Diagnosis Date     Atypical ductal hyperplasia of left breast 02/2022     Depression      Gastro-oesophageal reflux disease      Graves disease      Multiple sclerosis (H)      Osteoporosis      Postablative hypothyroidism            Past Surgical History:      Past Surgical History:   Procedure Laterality Date     C/SECTION, LOW TRANSVERSE  1992     COLONOSCOPY  2007     COLONOSCOPY N/A 1/26/2017    Procedure: COMBINED COLONOSCOPY, SINGLE OR MULTIPLE BIOPSY/POLYPECTOMY BY BIOPSY;  Surgeon: Mayo Adkins MD, MD;  Location:  GI     ESOPHAGOSCOPY, GASTROSCOPY, DUODENOSCOPY (EGD), COMBINED   2017    Dr. Adkins [facility]     ESOPHAGOSCOPY, GASTROSCOPY, DUODENOSCOPY (EGD), COMBINED N/A 2017    Procedure: COMBINED ESOPHAGOSCOPY, GASTROSCOPY, DUODENOSCOPY (EGD), BIOPSY SINGLE OR MULTIPLE;  Surgeon: Mayo Adkins MD, MD;  Location:  GI     ESOPHAGOSCOPY, GASTROSCOPY, DUODENOSCOPY (EGD), COMBINED N/A 2023    Procedure: ESOPHAGOGASTRODUODENOSCOPY, WITH BIOPSY;  Surgeon: Cristina Zuniga MD;  Location: [facility] GI     HIP SURGERY      femur ortho surgery     LAPAROSCOPIC CHOLECYSTECTOMY  2014    Procedure: LAPAROSCOPIC CHOLECYSTECTOMY;  Surgeon: Randy Bailey MD;  Location:  [facility]     LUMPECTOMY BREAST Left 3/31/2022    Procedure: LEFT Radiofrequency Identification Seed-Localized Lumpectomy;  Surgeon: Amanda Liu MD;  Location: [facility] OR     OPEN REDUCTION INTERNAL FIXATION FEMUR DISTAL Left 2018    Procedure: OPEN REDUCTION INTERNAL FIXATION LEFT FEMUR DISTAL;  Surgeon: Jose Valadez MD;  [facility]     OPEN REDUCTION INTERNAL FIXATION RODDING INTRAMEDULLARY TIBIA  2013    Procedure: OPEN REDUCTION INTERNAL FIXATION RODDING INTRAMEDULLARY TIBIA;;  Surgeon: Sajan Cast MD;  [facility]     ORTHOPEDIC SURGERY      surgery right upper femur fx near hip          Social History:     Social History     Tobacco Use     Smoking status: Every Day     Packs/day: 0.25     Types: Cigarettes     Last attempt to quit: 2022     Years since quittin.4     Smokeless tobacco: Never   Substance Use Topics     Alcohol use: Yes     Alcohol/week: 0.0 standard drinks of alcohol     Comment: occasional           Family History:     Family History   Problem Relation Age of Onset     Heart Disease Maternal Grandmother      Cancer Maternal Grandfather      Heart Disease Paternal Grandfather      Heart Disease Mother      Heart Disease Father      Diabetes No family hx of      Coronary Artery Disease No family hx of      Hypertension No family hx of       Hyperlipidemia No family hx of      Cerebrovascular Disease No family hx of      Breast Cancer No family hx of      Colon Cancer No family hx of      Prostate Cancer No family hx of      Other Cancer No family hx of      Depression No family hx of      Anxiety Disorder No family hx of      Mental Illness No family hx of      Substance Abuse No family hx of      Anesthesia Reaction No family hx of      Asthma No family hx of      Osteoporosis No family hx of      Genetic Disorder No family hx of      Thyroid Disease No family hx of      Obesity No family hx of      Unknown/Adopted No family hx of            Allergies:      Allergies   Allergen Reactions     Amantadine      hallucinations     Budesonide      Budesonide-Formoterol Fumarate Rash          Medications:     Current Outpatient Medications   Medication Sig     acetaminophen (TYLENOL) 500 MG tablet Take 1-2 tablets (500-1,000 mg) by mouth every 8 hours as needed for mild pain or fever (greater than 101 degrees)     acyclovir (ZOVIRAX) 400 MG tablet Take 1 tablet (400 mg) by mouth every 12 hours     armodafinil (NUVIGIL) 250 MG TABS tablet Take 1 tablet by mouth daily at 2 pm     baclofen (LIORESAL) 10 MG tablet 2 times daily Take 2 tablets (20 MG) by mouth every morning, 1 tablet (10 MG) by mouth daily in the afternoon as needed, and 3 tablets (30 MG) by mouth every evening before bedtime.     calcium carbonate 500 mg, elemental, (OSCAL 500) 1250 (500 Ca) MG TABS tablet Take 1 tablet by mouth daily     DULoxetine (CYMBALTA) 20 MG capsule Take 1 capsule (20 mg) by mouth daily Take along with 60 mg for a total of 80 mg     DULoxetine (CYMBALTA) 60 MG capsule TAKE 1 CAPSULE BY MOUTH EVERY DAY     famotidine (PEPCID) 40 MG tablet Take 1 tablet (40 mg) by mouth At Bedtime     ferrous sulfate (FEROSUL) 325 (65 Fe) MG tablet Take 325 mg by mouth daily (with breakfast)     gabapentin (NEURONTIN) 300 MG capsule Take 300 mg by mouth 2 times daily Take 2 tablets  (600 MG) by mouth 3-4 times daily.     hydrochlorothiazide (HYDRODIURIL) 12.5 MG tablet Take 1 tablet (12.5 mg) by mouth every morning     latanoprost (XALATAN) 0.005 % ophthalmic solution INSTILL 1 DROP INTO BOTH EYES EVERY DAY AS DIRECTED PT WILL NEED TO BE SEEN FOR FUTURE REFILLS     levothyroxine (SYNTHROID/LEVOTHROID) 100 MCG tablet Take 1 tablet (100 mcg) by mouth six times a week Recheck TSH with lab only visit before next refill     multivitamin w/minerals (THERA-VIT-M) tablet Take 1 tablet by mouth every evening     nitroFURantoin macrocrystal-monohydrate (MACROBID) 100 MG capsule Take 1 capsule (100 mg) by mouth daily     oxybutynin (DITROPAN-XL) 10 MG 24 hr tablet Take 1 tablet (10 mg) by mouth 2 times daily     guaiFENesin-codeine (ROBITUSSIN AC) 100-10 MG/5ML solution Take 5-10 mLs by mouth nightly as needed for cough (Patient not taking: Reported on 6/22/2023)     No current facility-administered medications for this visit.          Review of Systems:     See HPI         Physical Exam:   LMP  (LMP Unknown)     Constitutional - looks well, in no apparent distress  Eyes - no redness or discharge  Respiratory -breathing appears comfortable. No wheeze or rhonchi.   Skin - No appreciable discoloration or lesions (very limited exam)  Neurological - No apparent tremors. Speech fluent and articlate  Psychiatric - no signs of delirium or anxiety          Current Laboratory Data:   All laboratory and imaging data reviewed.    No results found. However, due to the size of the patient record, not all encounters were searched. Please check Results Review for a complete set of results.

## 2023-06-22 NOTE — NURSING NOTE
Is the patient currently in the state of [place]? YES    Visit mode:VIDEO    If the visit is dropped, the patient can be reconnected by: VIDEO VISIT: Send to e-mail at: [email or web address]    Will anyone else be joining the visit? NO      How would you like to obtain your AVS? MyChart    Are changes needed to the allergy or medication list? NO    Reason for visit: RECHECK (Follow up after CT scan)    Jael DAILY

## 2023-07-05 ENCOUNTER — THERAPY VISIT (OUTPATIENT)
Dept: OCCUPATIONAL THERAPY | Facility: CLINIC | Age: 68
End: 2023-07-05
Attending: PSYCHIATRY & NEUROLOGY
Payer: MEDICARE

## 2023-07-05 DIAGNOSIS — Z78.9 IMPAIRED MOBILITY AND ADLS: Primary | ICD-10-CM

## 2023-07-05 DIAGNOSIS — G35 MS (MULTIPLE SCLEROSIS) (H): ICD-10-CM

## 2023-07-05 DIAGNOSIS — Z74.09 IMPAIRED MOBILITY AND ADLS: Primary | ICD-10-CM

## 2023-07-05 PROCEDURE — 97542 WHEELCHAIR MNGMENT TRAINING: CPT | Mod: GO | Performed by: OCCUPATIONAL THERAPIST

## 2023-07-05 NOTE — PROGRESS NOTES
SEATING AND WHEELED MOBILITY ASSESSMENT    [facility]  Occupational Therapy   Date of service: July 5, 2023    Referring provider: Favian Redman    Order Date 6/1/23  Onset Date: 6/1/23    Order Details: Wheelchair assesment    Funding: Medicare/[employer]    Height/Weight: 5'8 / 145    Medical diagnosis:   Multiple sclerosis    Pertinent medical history/surgery:  Nervous and Auditory  MS (multiple sclerosis) (H)  Epigastric pain     Respiratory  Lung nodules  Community acquired pneumonia, unspecified laterality     Digestive  Candida esophagitis (H)  Constipation     Endocrine  Hyperlipidemia with target LDL less than 130  Graves disease  Postablative hypothyroidism  Multiple thyroid nodules     Musculoskeletal and Integumentary  L tib fx s/p IM nailing  Fracture of femur, distal, left, closed (H)  Osteoporosis of multiple sites     Urinary  CKD (chronic kidney disease) stage 3, GFR 30-59 ml/min (H)  Acute cystitis without hematuria     Hematologic  Anemia     Infectious/Inflammatory  HSV (herpes simplex virus) infection     Behavioral  Major depression in complete remission (H)  Former tobacco use     Other  UTI prophylaxis  History of fracture of fibula  History of tibial fracture  Atypical ductal hyperplasia of left breast  Ductal carcinoma in situ (DCIS) of left breast  Treatment-emergent central sleep apnea    Patient concerns/goals: wheelchair replacement    Living environment:  House    Living environment barriers:  Ramp(s) present      Current assistance/living environment:  Lives with     Community Mobility:  Transportation: Transportation Services, Van, [employer] mobility  Community Mobility Requirements: Medical Appointments, Shopping, [employer]  Accessibility Requirements for Community Settings: STEP therapies 1x per week      Current mobility equipment:  Front wheeled walker - for  "exercise only with STEP  Manual wheelchair - needs replacement  Power wheelchair - given to her and uses for community distances    Current Manual WC: Age: 2016, : [employer] 9000, Cushion: Gel, Wheelchair Back: Solid Curved, Footrest Style: swing away, Headrest: No, Settings Used: Home, Outdoor Community Mobility, Primary Means of Transportation, Condition: Fair, Beyond Further Justifiable Repair, Current Equipment Requires: Replacement, Rationale for Equipment Changes: full time heavy duty use, needs lighter options    Manual WC Propulsion: Assist required  WC Use: Ability to Perform Weight Shifts: Assist required, Bed Confined without WC: Yes, Hours in WC Daily: 10, Hours Spent Alone Daily: 5    Patient Measurements:- Per atp notes, via home visit    Fall Risk Screen:   Has the patient fallen 2 or more times in the last year? Yes      Has the patient fallen and had an injury in the past year? No       Timed Up and Go Score: Not able to perform due to unable    Is the patient a fall risk? Yes, department fall risk interventions implemented    ADL:   Feeding self:  Yes, assist with cutting  Grooming: ind  UE Dressing:  ind  LE Dressing:  LB assist  Showering/bathing: sponge bathes  Toileting/transfer:  Incontinent and changes brief while standing, assist for transfer to toilet and LB mangement with commode rails  Functional Mobility: 2ww for 10\" for exercise only, PWC and PWC back up     Current ADL equipment: Commode  Wall grab bar    IADL:   Meals: small meals only, microwave  Home management:  Spouse completes mostly, deep cleaning PRN  Driving:  Spouse completes  Occupation: n/a  Leisure: n/a  Emergency Call system: cell phone    Sleep Surface/Equipment: standard bed with rail, transfer board    Evaluation     Pain assessment:  Location: bottom/sacrum/Rating: 10 at times    Cognition:  Tangential    Vision: glasses    Hearing: WNL    Fatigue:  Reported Problems: limited sustained energy to support " full time w/c propulsion with UE, assist needed back to clinic today    Balance:  Unsupported Sitting Balance: Uses UE for Balance  Sitting Balance in Chair: Uses UE for Balance  Standing Balance: Uses UE for Balance, Physical Assistance Required    Ambulation:  Level of [place]: Max Assist for exercise only 10 ft  Assistive Device(s): Walker (front wheeled)    Transfers:   ind to max assist depending on environment, time of day ect    Neuromuscular:  History of Pressure Sores: No  Current Pressure Sores: No  Able to Perform Effective Pressure Relief/Reperfusion at Seated Surface: No  Methods of Pressure Relief: Leaning  Methods Performed Consistently Through the Day: No  Coordination:  UE Coordination: SLOW LASHAUN L>R  LE Coordination: Limited LASHAUN    Tone:   Hypotonic  Spasms in LE with pain    Sensation:  Sensory Deficits Reported: LB limitations  Head and Neck:  Head and Neck Position: Flexed  Head Control: Adequate    Upper Extremities  UE ROM: WFL with lagging end ranges and limited full hand oppostion  UE Strength: 4/5 with limited endraunce and grasp  Dominance: Right    Pelvis  Anterior/Posterior Pelvis Position: Posterior Tilt    Trunk:  Anterior/Posterior Trunk Position: Increased Thoracic Kyphosis    Lower Extremities:  LE ROM: Limited active quads, R knee full active, full passive  LE Strength: L 1/5 R quad 2-, knee 3-, ankle 2-  Foot Positioning: Inverted, Plantarflexed, L AFO    Edema: 1+ pitting edema with compression stockings on      Impairments:  Fatigue  Muscle atrophy  Coordination  Balance  Range of motion  Skin integrity     Treatment diagnosis:  Impaired mobility  Impaired activities of daily living    Recommendations/Plan of care:  Patient would benefit from interventions to enhance safety and independence.  Equipment recommended: power mobility.    Goals:   Target date:   Patient, family and/or caregiver will verbalize/demonstrate understanding of compensatory methods /equipment to enhance  functional independence and safety.    Educational assessment/barriers to learning:  Emotional  Cognitive    Treatment provided this date:   Wheelchair management, 60 minutes   Educated patient on pro/cons of power vs manual with progressive condition.  Patient repots severe bottom pain and start of sores as she sits in the same position for 10 plus hours without relief.  Educated on need to get out of chair to get relief but this is challenging.  Spouse joined mid eval and when therapist educated on use of power mobility to aid in fatigue mgmt, independence as well as pressure relief while in wheelchair and alone and not able to get out of chair.  Demo'd and educated on all seat functions and use.  Educated on home trial and coverage.  Handoff completed with Diogo Dillon    Response to treatment/recommendations: receptive but resistant    Goal attainment:  All goals met    Risks and benefits of evaluation/treatment have been explained.  Patient, family and/or caregiver are in agreement with Plan of Care.     Timed Code Treatment Minutes: 60  Total Treatment Time (sum of timed and untimed services): 60    Electronically signed by:  Kiara HIDALGO/MICHELLE, ATP      Occupational Therapist, Assistive   [phone]      fax: [phone]      [email or web address]  [facility], [address]  [address]  [place]   [address]    July 5, 2023

## 2023-08-15 NOTE — PROGRESS NOTES
[facility] Medical Oncology Note       Date of visit: August 17, 2023  Outpatient Clinic Note    ADDENDUM 1738  Marylee's further imaging reviewed:    Additional mammogram views obtained to further evaluate possible  asymmetry mid-posterior depth lower outer left breast MLO view  described on same date mammogram.  Additional views do not confirm any  suspicious finding in this region.  Normal fibroglandular breast  tissue noted.     Targeted left breast ultrasound by radiologist and technologist  confirms normal breast tissue in this region of inferior/lower outer  left breast which correlates with mammogram.  Examination performed in  wheelchair.                                                                      IMPRESSION: BI-RADS CATEGORY: 2 - Benign.     RECOMMENDED FOLLOW-UP: Annual Mammography.       We will see her again in 6 months for routine follow-up  SB      Assessment:     Subcm DCIS status postlumpectomy, in a 66-year-old woman who is on medical disability because of progressive multiple sclerosis.  She is in a wheelchair at baseline.  She has incontinence.  She can ambulate 20-25 steps with the assistance of a walker.  But we have to be careful about any therapy worsening her overall quality of life.  We treat ductal carcinoma in situ to prevent the development of invasive disease.  Adjuvant radiotherapy is standard in this setting.  She received this with only adequate tolerance. In fact, she still has significant fatigue a few months after completing the radiotherapy.  This colors her approach to any other therapies such as endocrine treatment.  In terms of systemic therapy, both tamoxifen and aromatase inhibitors have been studied in this setting and have never been shown to affect survival.  They can decrease the relative risk of invasive cancer, but the absolute benefit is in the low to mid single digits.  There is equivalence of 5 mg of tamoxifen for 3 years as compared to 20 mg  for 5 years.  But clearly there is no impact on survival.  Henrik wants us to focus on her quality of life.  Therefore in my opinion, the benefit of adjuvant endocrine treatment in this setting is far outweighed by any chance of worsening her tenuous quality of life right now.  Today there was a vague abnormality seen on her screening mammogram.  She will be getting compression views. I anticipate these will come out okay, but I will add an addendum to today's note if not.  I had a long and involved conversation with Malorie Almendarez and her  Manuel during her virtual visit today.  Many questions were asked and hopefully answered to her satisfaction.        Plan:     Follow-up results of today's compression views.  Sent back to radiology for spot compression of left breast. Discussed possibly need for biopsy depending on results of spot compression. Reassured that this density will likely resolve with spot compression and will not require any intervention  Return to clinic in 1 year for routine follow-up, if the above is normal.    Kenny Martinez MD, MSc  Associate Professor of Medicine  [facility] [place] [address]  [phone]    __________________________________________________________________    CHIEF COMPLAINT     Sub centimeter grade 2 DCIS, diagnosed at left breast lumpectomy 3/31/2022. Final pathology showed a 7 mm area of grade 2 ductal carcinoma in situ.  There was no evidence of invasion in the specimen.  Margins were uninvolved with the closest margin being 4 mm away.  The tumor was estrogen receptor positive.  Multiple Sclerosis, for which she is severely disabled. She spends most of the day in a wheelchair.  She has urinary incontinence.  She does do physical therapy once a week.  With assistance from her  Manuel, she can ambulate about 20 feet with a walker.  She then gets tired.  She has issues with spasmatic  muscles and is taking antispasmodics.  She has not had any specific drugs for her multiple sclerosis since 2017.  Things have been pretty stable.      History of Present Illness/THERAPY TO DATE:     Left lumpectomy 3/31/2022.  Adjuvant RT from 6/1-6/5 2600 cGy in 5 fractions. This was tolerated very poorly.  Observation in the interim. Endocrine therapy was discussed but the risk/benefit ratio was felt to be not worth prescribing.        Interval History:     Marylee is for a yearly visit, she states she has been feeling well overall physically in the past year  She has had to deal with some hypothyroid issues over the past year, but things are now stable with her medications.   Had an MS flare this past year, treated with a course of IV and then oral steroids   She continues to feels like she is bouncing back after these main issues   No changes in her breast over the past year, no lump/bumps. No skin changes   Sometimes has occasional tenderness in left breast that is self limited and resolves in a few days  No new onset fatigue, no abdominal pain, no back or bone pain   Her and her  monitor closely for pressure sores   Had her second grandchild this past year from her son. Unfortunately her daughter has a stillbirth this past year and that has been very difficult emotionally  Overall feels comfortable with observation at this time        Past Medical History:   I have reviewed this patient's past medical history   Past Medical History:   Diagnosis Date    Atypical ductal hyperplasia of left breast 02/2022    Depression     Gastro-oesophageal reflux disease     Graves disease     Multiple sclerosis (H)     Osteoporosis     Postablative hypothyroidism           Past Surgical History:    I have reviewed this patient's past surgical history       Social History:   Tobacco, ETOH, and rec drugs reviewed and as noted below with the following exceptions:  Malorie Almendarez grew up in [place] and graduated from [place]  [employer] in 1973.  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  After that she was in the  of another [employer] and she really started thinking that she wanted to do something else with her life.  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  She really liked the work.  She ultimately had to retire because of progression of her multiple sclerosis.  Her  is Manuel whom she met outside of the [facility].  [place] were [place]  They  in 1985.  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  They have 3 children, Marleni, Joss, and Janeth with one 1-year-old grandson named Sami who lives in [place].  Her bucket list included seeing the [place] (done) going to [employer] (done).  She is planning next week to go to [employer] and spent a lot of time in the [employer] themed areas since she is a big fan of those books.  She is a [employer]          Family History:     Family History   Problem Relation Age of Onset    Heart Disease Maternal Grandmother     Cancer Maternal Grandfather     Heart Disease Paternal Grandfather     Heart Disease Mother     Heart Disease Father     Diabetes No family hx of     Coronary Artery Disease No family hx of     Hypertension No family hx of     Hyperlipidemia No family hx of     Cerebrovascular Disease No family hx of     Breast Cancer No family hx of     Colon Cancer No family hx of     Prostate Cancer No family hx of     Other Cancer No family hx of     Depression No family hx of     Anxiety Disorder No family hx of     Mental Illness No family hx of     Substance Abuse No family hx of     Anesthesia Reaction No family hx of     Asthma No family hx of     Osteoporosis No family hx of     Genetic Disorder No family hx of     Thyroid Disease No family hx of     Obesity No family hx of     Unknown/Adopted No family hx of              Medications:     Current Outpatient Medications   Medication Sig Dispense Refill    acetaminophen (TYLENOL) 500 MG tablet Take 1-2 tablets (500-1,000 mg) by mouth every 8 hours as needed for mild pain or fever (greater than 101 degrees)      acyclovir (ZOVIRAX) 400 MG tablet Take 1 tablet (400 mg) by mouth every 12 hours 180 tablet 3    armodafinil (NUVIGIL) 250 MG TABS tablet Take 1 tablet by mouth daily at 2 pm      baclofen (LIORESAL) 10 MG tablet 2 times daily Take 2 tablets (20 MG) by mouth every morning, 1 tablet (10 MG) by mouth daily in the afternoon as needed, and 3 tablets (30 MG) by mouth every evening before bedtime.      calcium carbonate 500 mg, elemental, (OSCAL 500) 1250 (500 Ca) MG TABS tablet Take 1 tablet by mouth daily      DULoxetine (CYMBALTA) 20 MG capsule Take 1 capsule (20 mg) by mouth daily Take along with 60 mg for a total of 80 mg 90 capsule 3    DULoxetine (CYMBALTA) 60 MG capsule TAKE 1 CAPSULE BY MOUTH EVERY DAY 90 capsule 3    famotidine (PEPCID) 40 MG tablet Take 1 tablet (40 mg) by mouth At Bedtime 90 tablet 3    ferrous sulfate (FEROSUL) 325 (65 Fe) MG tablet Take 325 mg by mouth daily (with breakfast)      gabapentin (NEURONTIN) 300 MG capsule Take 300 mg by mouth 2 times daily Take 2 tablets (600 MG) by mouth 3-4 times daily.      guaiFENesin-codeine (ROBITUSSIN AC) 100-10 MG/5ML solution Take 5-10 mLs by mouth nightly as needed for cough (Patient not taking: Reported on 6/22/2023) 120 mL 0    hydrochlorothiazide (HYDRODIURIL) 12.5 MG tablet Take 1 tablet (12.5 mg) by mouth every morning 90 tablet 3    latanoprost (XALATAN) 0.005 % ophthalmic solution INSTILL 1 DROP INTO BOTH EYES EVERY DAY AS DIRECTED PT WILL NEED TO BE SEEN FOR FUTURE REFILLS      levothyroxine (SYNTHROID/LEVOTHROID) 100 MCG tablet Take 1 tablet (100 mcg) by mouth six times a week 30 tablet 0    multivitamin w/minerals (THERA-VIT-M) tablet Take 1 tablet by mouth every evening      nitroFURantoin  macrocrystal-monohydrate (MACROBID) 100 MG capsule Take 1 capsule (100 mg) by mouth daily 90 capsule 3    oxybutynin (DITROPAN-XL) 10 MG 24 hr tablet Take 1 tablet (10 mg) by mouth 2 times daily 30 tablet               Physical Exam:   not currently breastfeeding.  Minimal exam was done today due to the consultative nature of our appointment.    Constitutional: WDWN female in NAD, pleasant and appropriate  Neurologic: alert, answering questions appropriately,   Psych: appropriate affect  Data:     Recent Labs   Lab Test 07/02/21 0727 07/01/21  0713 06/30/21  1717 06/10/21  0904 07/03/20  1525 11/27/18  0933 11/15/18  0711 11/12/18  0622   WBC 9.8 10.4 8.1 5.7 3.6* 3.2*  --  3.2*   NEUTROPHIL 83.8 84.8 77.5  --   --  78.0  --  76.7   HGB 12.6 12.2 14.7 12.2 9.2* 9.6*  --  7.4*    149* 194 177 181 179   < > 204    < > = values in this interval not displayed.     Recent Labs   Lab Test 02/24/22 0830 08/12/21 1033 07/09/21  1538 07/02/21 0727 07/01/21 0713 06/30/21 1717   NA  --  137 136 139 139 136   POTASSIUM  --  3.8 4.5 3.9  3.9 4.4 2.9*   CHLORIDE  --  102 102 104 109 98   CO2  --  34* 34* 25 23 32   ANIONGAP  --  1* <1* 9 7 6   BUN  --  15 9 25 12 13   CR 1.04 1.08* 0.98 0.96 0.88 0.97   JORDON 9.2 9.5 9.5 8.8 8.3* 9.6     Recent Labs   Lab Test 08/12/21  1033 07/01/21  0713 06/30/21  1717 11/02/18  0521 10/03/17  1449   MAG  --  1.8 1.9 1.8  --    PHOS 3.4  --  3.8  --   --    LDH  --   --   --   --  181     Recent Labs   Lab Test 08/12/21  1033 07/02/21  0727 07/01/21  0713 06/30/21  1717 11/01/17  0047 10/03/17  1449   BILITOTAL  --  0.7 0.8 0.7   < > 0.3   ALKPHOS  --  64 58 72   < > 78   ALT  --  19 16 21   < > 21   AST  --  30 33 26   < > 23   ALBUMIN 3.6 3.4 3.0* 4.0   < > 3.9   LDH  --   --   --   --   --  181    < > = values in this interval not displayed.       No results found for this or any previous visit (from the past 24 hour(s)).    Other Data     LEFT breast, 3:00, RFID seed localized  lumpectomy:  -Ductal carcinoma in situ (DCIS), nuclear grade 2, size 7 mm, arising in a sclerosed intraductal papilloma  -No invasive carcinoma identified  -Margins are uninvolved by DCIS  -DCIS is 4 mm from the closest (posterior) margin, 5 mm from the lateral margin, and is > 5 mm from the  anterior, superior, inferior, and medial margins  -Other findings: fibrocystic change (including microcysts) and usual ductal hyperplasia  -Calcifications associated with sclerotic stroma and with benign ducts and acini  -Prior core biopsy site change  -DCIS is estrogen receptor positive and progesterone receptor negative by immunohistochemistry (see  biomarker reporting template below)  -See tumor synoptic below  Electronically signed by Juana Palomares MD on 4/7/2022 at 10:45 PM  .  Synoptic Checklist  DCIS OF THE BREAST: Resection (DCIS OF THE BREAST: COMPLETE EXCISION - All Specimens) 8th Edition -  Protocol posted: 2/26/2020  SPECIMEN  Procedure Excision (less than total mastectomy)  Specimen Laterality Left  .      Labs, imaging and treatment plan reviewed with patient. All questions answered.    Seen with JEREMY Franklin    30 minutes spent on the date of the encounter doing chart review, review of outside records, review of test results, interpretation of tests, patient visit, documentation and discussion with family

## 2023-08-17 ENCOUNTER — ANCILLARY PROCEDURE (OUTPATIENT)
Dept: MAMMOGRAPHY | Facility: CLINIC | Age: 68
End: 2023-08-17
Attending: INTERNAL MEDICINE
Payer: MEDICARE

## 2023-08-17 ENCOUNTER — ONCOLOGY VISIT (OUTPATIENT)
Dept: ONCOLOGY | Facility: CLINIC | Age: 68
End: 2023-08-17
Attending: INTERNAL MEDICINE
Payer: MEDICARE

## 2023-08-17 VITALS
SYSTOLIC BLOOD PRESSURE: 119 MMHG | OXYGEN SATURATION: 99 % | DIASTOLIC BLOOD PRESSURE: 73 MMHG | TEMPERATURE: 98.6 F | HEART RATE: 80 BPM | RESPIRATION RATE: 16 BRPM

## 2023-08-17 DIAGNOSIS — N63.25 UNSPECIFIED LUMP IN THE LEFT BREAST, OVERLAPPING QUADRANTS: ICD-10-CM

## 2023-08-17 DIAGNOSIS — D05.12 DUCTAL CARCINOMA IN SITU (DCIS) OF LEFT BREAST: ICD-10-CM

## 2023-08-17 DIAGNOSIS — D05.12 DUCTAL CARCINOMA IN SITU (DCIS) OF LEFT BREAST: Primary | ICD-10-CM

## 2023-08-17 PROCEDURE — G0279 TOMOSYNTHESIS, MAMMO: HCPCS | Mod: GG | Performed by: STUDENT IN AN ORGANIZED HEALTH CARE EDUCATION/TRAINING PROGRAM

## 2023-08-17 PROCEDURE — 99214 OFFICE O/P EST MOD 30 MIN: CPT | Performed by: INTERNAL MEDICINE

## 2023-08-17 PROCEDURE — 76642 ULTRASOUND BREAST LIMITED: CPT | Mod: LT | Performed by: STUDENT IN AN ORGANIZED HEALTH CARE EDUCATION/TRAINING PROGRAM

## 2023-08-17 PROCEDURE — 77067 SCR MAMMO BI INCL CAD: CPT | Mod: XE | Performed by: STUDENT IN AN ORGANIZED HEALTH CARE EDUCATION/TRAINING PROGRAM

## 2023-08-17 PROCEDURE — 77063 BREAST TOMOSYNTHESIS BI: CPT | Mod: XE | Performed by: STUDENT IN AN ORGANIZED HEALTH CARE EDUCATION/TRAINING PROGRAM

## 2023-08-17 PROCEDURE — G0463 HOSPITAL OUTPT CLINIC VISIT: HCPCS | Performed by: INTERNAL MEDICINE

## 2023-08-17 PROCEDURE — 77065 DX MAMMO INCL CAD UNI: CPT | Mod: LT | Performed by: STUDENT IN AN ORGANIZED HEALTH CARE EDUCATION/TRAINING PROGRAM

## 2023-08-17 ASSESSMENT — PAIN SCALES - GENERAL: PAINLEVEL: NO PAIN (0)

## 2023-08-17 NOTE — NURSING NOTE
"Oncology Rooming Note    August 17, 2023 8:36 AM   Marylee Woods is a 68 year old female who presents for:    Chief Complaint   Patient presents with    Oncology Clinic Visit     Ductal carcinoma in situ of left breast; unspecified lump in left breast     Initial Vitals: /73 (BP Location: Right arm, Patient Position: Sitting, Cuff Size: Adult Regular)   Pulse 80   Temp 98.6  F (37  C) (Oral)   Resp 16   LMP  (LMP Unknown)   SpO2 99%  Estimated body mass index is 25.81 kg/m  as calculated from the following:    Height as of 3/17/23: 1.6 m (5' 3\").    Weight as of 4/26/23: 66.1 kg (145 lb 11.2 oz). There is no height or weight on file to calculate BSA.  No Pain (0) Comment: Data Unavailable   No LMP recorded (lmp unknown). Patient is postmenopausal.  Allergies reviewed: Yes  Medications reviewed: Yes    Medications: Medication refills not needed today.  Pharmacy name entered into [facility]: [facility] - [place], [place] - [address]    Clinical concerns:       Bud Castaneda              "

## 2023-08-17 NOTE — LETTER
8/17/2023         RE: Marylee Woods  [address]  [facility] [address]        Dear Colleague,    Thank you for referring your patient, Marylee Woods, to the [facility]. Please see a copy of my visit note below.              [facility] Medical Oncology Note       Date of visit: August 17, 2023  Outpatient Clinic Note        Assessment:     Subcm DCIS status postlumpectomy, in a 66-year-old woman who is on medical disability because of progressive multiple sclerosis.  She is in a wheelchair at baseline.  She has incontinence.  She can ambulate 20-25 steps with the assistance of a walker.  But we have to be careful about any therapy worsening her overall quality of life.  We treat ductal carcinoma in situ to prevent the development of invasive disease.  Adjuvant radiotherapy is standard in this setting.  She received this with only adequate tolerance. In fact, she still has significant fatigue a few months after completing the radiotherapy.  This colors her approach to any other therapies such as endocrine treatment.  In terms of systemic therapy, both tamoxifen and aromatase inhibitors have been studied in this setting and have never been shown to affect survival.  They can decrease the relative risk of invasive cancer, but the absolute benefit is in the low to mid single digits.  There is equivalence of 5 mg of tamoxifen for 3 years as compared to 20 mg for 5 years.  But clearly there is no impact on survival.  Henrik wants us to focus on her quality of life.  Therefore in my opinion, the benefit of adjuvant endocrine treatment in this setting is far outweighed by any chance of worsening her tenuous quality of life right now.  Today there was a vague abnormality seen on her screening mammogram.  She will be getting compression views. I anticipate these will come out okay, but I will add an addendum to today's note if not.  I had a long and involved conversation with Malorie  Erickson and her  Manuel during her virtual visit today.  Many questions were asked and hopefully answered to her satisfaction.        Plan:     Follow-up results of today's compression views.  Sent back to radiology for spot compression of left breast. Discussed possibly need for biopsy depending on results of spot compression. Reassured that this density will likely resolve with spot compression and will not require any intervention  Return to clinic in 1 year for routine follow-up, if the above is normal.    Kenny Martinez MD, MSc  Associate Professor of Medicine  [facility] [employer]  [facility] [address] [address]  [phone]    __________________________________________________________________    CHIEF COMPLAINT     Sub centimeter grade 2 DCIS, diagnosed at left breast lumpectomy 3/31/2022. Final pathology showed a 7 mm area of grade 2 ductal carcinoma in situ.  There was no evidence of invasion in the specimen.  Margins were uninvolved with the closest margin being 4 mm away.  The tumor was estrogen receptor positive.  Multiple Sclerosis, for which she is severely disabled. She spends most of the day in a wheelchair.  She has urinary incontinence.  She does do physical therapy once a week.  With assistance from her  Manuel, she can ambulate about 20 feet with a walker.  She then gets tired.  She has issues with spasmatic muscles and is taking antispasmodics.  She has not had any specific drugs for her multiple sclerosis since 2017.  Things have been pretty stable.      History of Present Illness/THERAPY TO DATE:     Left lumpectomy 3/31/2022.  Adjuvant RT from 6/1-6/5 2600 cGy in 5 fractions. This was tolerated very poorly.  Observation in the interim. Endocrine therapy was discussed but the risk/benefit ratio was felt to be not worth prescribing.        Interval History:     Marylee is for a yearly visit, she states she has been feeling well  overall physically in the past year  She has had to deal with some hypothyroid issues over the past year, but things are now stable with her medications.   Had an MS flare this past year, treated with a course of IV and then oral steroids   She continues to feels like she is bouncing back after these main issues   No changes in her breast over the past year, no lump/bumps. No skin changes   Sometimes has occasional tenderness in left breast that is self limited and resolves in a few days  No new onset fatigue, no abdominal pain, no back or bone pain   Her and her  monitor closely for pressure sores   Had her second grandchild this past year from her son. Unfortunately her daughter has a stillbirth this past year and that has been very difficult emotionally  Overall feels comfortable with observation at this time        Past Medical History:   I have reviewed this patient's past medical history   Past Medical History:   Diagnosis Date    Atypical ductal hyperplasia of left breast 02/2022    Depression     Gastro-oesophageal reflux disease     Graves disease     Multiple sclerosis (H)     Osteoporosis     Postablative hypothyroidism           Past Surgical History:    I have reviewed this patient's past surgical history       Social History:   Tobacco, ETOH, and rec drugs reviewed and as noted below with the following exceptions:  Malorie Almendarez grew up in [place] and graduated from [employer] school in 1973.  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  After that she was in the  of another [employer] and she really started thinking that she wanted to do something else with her life.  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  She really liked the work.  She ultimately had to retire because of progression of her multiple sclerosis.  Her  is Manuel whom she met outside of the [employer].  [place] were fans of [place]  They  in 1985.  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  They have 3 children, Marleni, Joss, and Janeth with one 1-year-old grandson named Sami who lives in [place].  Her bucket list included seeing the [place] in [place] (done) going to [employer] (done).  She is planning next week to go to [employer] and spent a lot of time in the [employer] themed areas since she is a big fan of those books.  She is a [employer]          Family History:     Family History   Problem Relation Age of Onset    Heart Disease Maternal Grandmother     Cancer Maternal Grandfather     Heart Disease Paternal Grandfather     Heart Disease Mother     Heart Disease Father     Diabetes No family hx of     Coronary Artery Disease No family hx of     Hypertension No family hx of     Hyperlipidemia No family hx of     Cerebrovascular Disease No family hx of     Breast Cancer No family hx of     Colon Cancer No family hx of     Prostate Cancer No family hx of     Other Cancer No family hx of     Depression No family hx of     Anxiety Disorder No family hx of     Mental Illness No family hx of     Substance Abuse No family hx of     Anesthesia Reaction No family hx of     Asthma No family hx of     Osteoporosis No family hx of     Genetic Disorder No family hx of     Thyroid Disease No family hx of     Obesity No family hx of     Unknown/Adopted No family hx of             Medications:     Current Outpatient Medications   Medication Sig Dispense Refill    acetaminophen (TYLENOL) 500 MG tablet Take 1-2 tablets (500-1,000 mg) by mouth every 8 hours as needed for mild pain or fever (greater than 101 degrees)      acyclovir (ZOVIRAX) 400 MG tablet Take 1 tablet (400 mg) by mouth every 12 hours 180 tablet 3    armodafinil (NUVIGIL) 250 MG TABS tablet Take 1 tablet by mouth daily at 2 pm      baclofen (LIORESAL) 10 MG tablet 2 times daily Take 2 tablets (20 MG) by mouth every morning, 1 tablet (10  MG) by mouth daily in the afternoon as needed, and 3 tablets (30 MG) by mouth every evening before bedtime.      calcium carbonate 500 mg, elemental, (OSCAL 500) 1250 (500 Ca) MG TABS tablet Take 1 tablet by mouth daily      DULoxetine (CYMBALTA) 20 MG capsule Take 1 capsule (20 mg) by mouth daily Take along with 60 mg for a total of 80 mg 90 capsule 3    DULoxetine (CYMBALTA) 60 MG capsule TAKE 1 CAPSULE BY MOUTH EVERY DAY 90 capsule 3    famotidine (PEPCID) 40 MG tablet Take 1 tablet (40 mg) by mouth At Bedtime 90 tablet 3    ferrous sulfate (FEROSUL) 325 (65 Fe) MG tablet Take 325 mg by mouth daily (with breakfast)      gabapentin (NEURONTIN) 300 MG capsule Take 300 mg by mouth 2 times daily Take 2 tablets (600 MG) by mouth 3-4 times daily.      guaiFENesin-codeine (ROBITUSSIN AC) 100-10 MG/5ML solution Take 5-10 mLs by mouth nightly as needed for cough (Patient not taking: Reported on 6/22/2023) 120 mL 0    hydrochlorothiazide (HYDRODIURIL) 12.5 MG tablet Take 1 tablet (12.5 mg) by mouth every morning 90 tablet 3    latanoprost (XALATAN) 0.005 % ophthalmic solution INSTILL 1 DROP INTO BOTH EYES EVERY DAY AS DIRECTED PT WILL NEED TO BE SEEN FOR FUTURE REFILLS      levothyroxine (SYNTHROID/LEVOTHROID) 100 MCG tablet Take 1 tablet (100 mcg) by mouth six times a week 30 tablet 0    multivitamin w/minerals (THERA-VIT-M) tablet Take 1 tablet by mouth every evening      nitroFURantoin macrocrystal-monohydrate (MACROBID) 100 MG capsule Take 1 capsule (100 mg) by mouth daily 90 capsule 3    oxybutynin (DITROPAN-XL) 10 MG 24 hr tablet Take 1 tablet (10 mg) by mouth 2 times daily 30 tablet               Physical Exam:   not currently breastfeeding.  Minimal exam was done today due to the consultative nature of our appointment.    Constitutional: WDWN female in NAD, pleasant and appropriate  Neurologic: alert, answering questions appropriately,   Psych: appropriate affect  Data:     Recent Labs   Lab Test 07/02/21  9064  07/01/21  0713 06/30/21  1717 06/10/21  0904 07/03/20  1525 11/27/18  0933 11/15/18  0711 11/12/18 0622   WBC 9.8 10.4 8.1 5.7 3.6* 3.2*  --  3.2*   NEUTROPHIL 83.8 84.8 77.5  --   --  78.0  --  76.7   HGB 12.6 12.2 14.7 12.2 9.2* 9.6*  --  7.4*    149* 194 177 181 179   < > 204    < > = values in this interval not displayed.     Recent Labs   Lab Test 02/24/22 0830 08/12/21 1033 07/09/21  1538 07/02/21 0727 07/01/21 0713 06/30/21 1717   NA  --  137 136 139 139 136   POTASSIUM  --  3.8 4.5 3.9  3.9 4.4 2.9*   CHLORIDE  --  102 102 104 109 98   CO2  --  34* 34* 25 23 32   ANIONGAP  --  1* <1* 9 7 6   BUN  --  15 9 25 12 13   CR 1.04 1.08* 0.98 0.96 0.88 0.97   JORDON 9.2 9.5 9.5 8.8 8.3* 9.6     Recent Labs   Lab Test 08/12/21 1033 07/01/21 0713 06/30/21 1717 11/02/18  0521 10/03/17  1449   MAG  --  1.8 1.9 1.8  --    PHOS 3.4  --  3.8  --   --    LDH  --   --   --   --  181     Recent Labs   Lab Test 08/12/21  1033 07/02/21  0727 07/01/21  0713 06/30/21 1717 11/01/17  0047 10/03/17  1449   BILITOTAL  --  0.7 0.8 0.7   < > 0.3   ALKPHOS  --  64 58 72   < > 78   ALT  --  19 16 21   < > 21   AST  --  30 33 26   < > 23   ALBUMIN 3.6 3.4 3.0* 4.0   < > 3.9   LDH  --   --   --   --   --  181    < > = values in this interval not displayed.       No results found for this or any previous visit (from the past 24 hour(s)).    Other Data     LEFT breast, 3:00, RFID seed localized lumpectomy:  -Ductal carcinoma in situ (DCIS), nuclear grade 2, size 7 mm, arising in a sclerosed intraductal papilloma  -No invasive carcinoma identified  -Margins are uninvolved by DCIS  -DCIS is 4 mm from the closest (posterior) margin, 5 mm from the lateral margin, and is > 5 mm from the  anterior, superior, inferior, and medial margins  -Other findings: fibrocystic change (including microcysts) and usual ductal hyperplasia  -Calcifications associated with sclerotic stroma and with benign ducts and acini  -Prior core biopsy site  change  -DCIS is estrogen receptor positive and progesterone receptor negative by immunohistochemistry (see  biomarker reporting template below)  -See tumor synoptic below  Electronically signed by Juana Palomares MD on 4/7/2022 at 10:45 PM  .  Synoptic Checklist  DCIS OF THE BREAST: Resection (DCIS OF THE BREAST: COMPLETE EXCISION - All Specimens) 8th Edition -  Protocol posted: 2/26/2020  SPECIMEN  Procedure Excision (less than total mastectomy)  Specimen Laterality Left  .      Labs, imaging and treatment plan reviewed with patient. All questions answered.    Seen with JEREMY Franklin    30 minutes spent on the date of the encounter doing chart review, review of outside records, review of test results, interpretation of tests, patient visit, documentation and discussion with family         Kenny Martinez MD

## 2023-08-29 DIAGNOSIS — E89.0 POSTABLATIVE HYPOTHYROIDISM: ICD-10-CM

## 2023-08-29 DIAGNOSIS — N18.31 STAGE 3A CHRONIC KIDNEY DISEASE (H): Primary | ICD-10-CM

## 2023-08-31 RX ORDER — LEVOTHYROXINE SODIUM 100 UG/1
TABLET ORAL
Qty: 30 TABLET | Refills: 0 | Status: SHIPPED | OUTPATIENT
Start: 2023-08-31 | End: 2023-10-03

## 2023-08-31 NOTE — TELEPHONE ENCOUNTER
I refilled medicine.   RN:  Patient due for lab only appointment.  Please pend labs that patient is due for and let them know to schedule lab only    Thank you!  SJ

## 2023-08-31 NOTE — TELEPHONE ENCOUNTER
TSH ordered as future.  BMP order signed.  [employer] message sent to patient.  Dasia LOUIS RN  [facility]

## 2023-09-27 NOTE — PROGRESS NOTES
REQUISITION AND JUSTIFICATION FOR DURABLE MEDICAL EQUIPMENT    Patient Name:  Marylee Woods  MR #:  [id number]  :  1955  Age/Gender:  68 year old female  Visit Date:  Marylee Woods seen for seating and wheeled mobility evaluation by Kiara HIDALGO/L,ATP on 2023.    CLINICAL CRITERIA FOR MOBILITY ASSISTIVE EQUIPMENT  Coverage Criteria Per Local Coverage Determination    A) Marylee has mobility limitations due to multiple sclerosis, lung nodules, Graves' disease, left femur and tibia fracture with ORIF, chronic kidney disease and prior breast cancer that significantly impairs her ability to participate in all of her mobility-related activities of daily living (MRADL). Specifically affected are toileting (being able to get there in time to prevent accidents), dressing, and bathing (getting into the bathroom of designated place). Current equipment used is indicated 9000 manual wheelchair from 2016 that is challenging for her to propel full-time due to MS progression. This patient needs the new equipment requested to be able to allow for safe and independent participation in MRADLS and assist with offloading for pressure relief. Please see additional documentation in the seating and wheeled mobility report for details.   Marylee had a successful clinical trial here, and also a successful trial at home with the recommended equipment. Marylee is very willing and physically / cognitively able to use the recommended equipment to assist her with mobility-related activities of daily living and general mobility. A group 3 power wheelchair is being requested because it has better suspension for a smooth ride and has the capabilities of expandable electronics to operate the  power seat functions Marylee needs for independence with her activities of daily living. A Group 2 power wheelchair does not have sufficient electronics to support this patient's progressive neurological deficits due to MS.  B) Marylee's  mobility limitation cannot be sufficiently and safely resolved by the use of an appropriately fitted cane or walker because she is nonambulatory requiring max assistance for 10 feet for exercise only with PT using a walker. TUG results not tested as unable. Strength of legs is left 1/5 right quad 2/5 knee 3 -/5 ankle 2 -/5 for one maximal repetition. Fatigue also impacts this patient's ability to ambulate, regardless of the gait aid.    C) Marylee does not have sufficient upper extremity function to self-propel an optimally-configured manual wheelchair in her home to perform MRADLs during a typical day due to limitations in strength, endurance, range of motion, and coordination. Distance and time to propel a light weight manual wheelchair not tested as she required assistance in clinic today with limited endurance and grasp of hands.  Strength of arms is 4/5.  D)  Marylee is not able to use a POV/scooter because it will not fit in her home environment. Marylee is unable to safely transfer to and from a POV, unable to operate the tiller steering system, and unable to maintain postural stability and position while operating the POV. Marylee needs more appropriate seating and positioning than any scooter seat provides.  E) The need for this equipment is LIFETIME.     RECOMMENDATIONS FOR MOBILITY BASE, SEATING SYSTEM AND COMPONENTS  [facility] - this mid wheel drive power wheelchair is needed for this patient to continue to have independent mobility and to be able to allow her to complete or assist in all of her mobility related activities of daily living (MRADLs). This wheelchair will also have the seating and positioning system and seat function she needs to be able to use and tolerate the wheelchair full time, and have functional and comfortable positioning for a full day's activities. She has Ms which impairs her ability to move in her home without the use of the requested wheelchair. She lives in  ramped home with level access.    100 amp Q-Logic 3 EX controller -  Needed for operation of the joystick for mobility and seat functions    Harness for expandable controller - needs to be inline for communication between the controller and the joystick    Q-Logic 3 EX Joystick - this is the joystick needed to be used by this patient for moving this wheelchair and also controlling the movement of the seat functions.    Swing away joystick mount - needed to be able to move the joystick out of the way during transfers, or when at the desk using the computer, or at the table eating, so as not to inadvertently hit the joystick, thus moving the wheelchair or turning the power on or off without her knowledge.    AMARILIS Balance Power Tilt and Recline  - This seating function combination is needed for this patient to be able to perform independent weight shifts and also to be able to change her seated position without the request of a care giver. She requires a powered seating system with both tilt and recline to optimize pressure distribution and postural repositioning.   The power tilt seat allows her to change her position by rotating rearward without changing the angle of her hips or knees. Due to atrophy of her buttocks she is at high risk of developing pressure ulcers if not able to change her position. Due to compromised ability to exert herself for weight shifting, the power tilt seat allows her to do this by operating it through the joystick. She can also use a slight degree of tilt for assisting in her seating and positioning for normal functions during the day a to assist keeping her in a midline, erect and upright position by using the effect of gravity.   The power reclining back feature also reduces pressures by allowing her to change the angle of her hips and knees into a more open and supine / recumbent position. This increases her tolerance and be able to remain in the requested wheelchair for a complete day  and not be dependent on care givers to change her position or transfer her to another surface for comfort or resting. The recline feature can be used to access the perineal area necessary for toileting assistance. The power tilt seat and power recline back are necessary features that allow tasks to be done by the care giver without the need for transferring back to bed for these activities.  The medical justification for these seat functions is consistent with the RESNA Position Papers regarding these seat function interventions (Jose et al., 2009). These seat functions will also reduce upper extremity strain per the [employer]aly[employer] [place] Guidelines for upper limb preservation (Boninger, et al., 2005).    Power elevating seat - Vertical movement is necessary to allow Marylee to function and participate in a three-dimensional world. This seat feature will increase her ability to reach by bringing her closer for better access with weak arms while reaching into cupboards or closets.  During the home trial she was able to use this feature to reach cupboards encounters to assist with cooking cleaning and self-cares.  She is transferring up to maximal assist depending on environment and time of day. This requested seat lift feature promotes safety with and improved independence with lateral transfers by allowing a level transfer or transfer from a higher to lower surface, which is gravity-assisted.  It also facilitates forward transfer by allowing legs, hips to be more extended, thereby lessening the strength required for the user to perform a stand-pivot transfer.  Power seat elevation also allows the user to have eye contact with others and reduces cervical strain and pain (including headaches from poor positioning). Vertical rise also provides psycho-social benefits of being on peer level and speaking eye-to-eye. Additionally, seat elevation allows certain medications to be kept out of reach of children but  "remain accessible to the user.    AMARILIS comfort Solid curved and padded back support - firm and contoured back support is needed to support Marylee's thoracolumbar area in an upright and midline position, with appropriate support pads as needed. This will provide support whether she is in the upright or tilted/reclined position. This back support is essential to provide sufficient lateral contour to maximize her postural alignment and minimize her tendency to develop scoliosis and other secondary complications.    Lateral wedges- needed to provide support for weak trunk muscles in order to provide upright posture for optimal environmental engagement.      Stealth height adjustable  needed to place the joystick of the wheelchair in a safe positioning for driving.    Stealth Comfort Plus 10\" headrest and mounting hardware - needed to keep Marylee's head in an erect, midline and upright position whether she is in the upright, tilted or reclined position. Her head and neck need to be supported as well as the rest of her body.    Multiaxis best mounting hardware - needed for mounting the requested headrest in the most appropriate position on the back of the wheelchair for optimal head and neck support and to be able to be removed for transfers.     Power Positioning electronics to be operated through the Ariisto logic controller - this is needed to allow her to use the joystick of the wheelchair for control of mobility and operation of the power seat function. This is important for this patient with limited strength and coordination so as not to require a separate switch for operation of the seat function.    Power elevating foot legrest- Allows for elevation and extension of lower extremities while elevating. This can improve circulation and prevent/reduce edema (with recline/tilt combination).  The lower legs of this wheelchair user act as a reservoir for fluid accumulation due to lack of movement. Elevation of this " patient's legs above the level of the heart (left atrium) is recommended as part of the management of edema in conjunction with other measures such as support garments  This patient has lower extremity dependent edema while sitting in her wheelchair, which resolves with elevation of her legs. This feature, when used with the power recline back or tilt seat, can increase Marylee's sitting tolerance while positioning her in a more natural position. This can also be helpful if she fatigues and requires rests throughout the day, without transferring her back to bed.  Due to inability to perform a functional weight shifting, she is at risk for developing pressure ulcers. With the use of this feature it will allow for optimal weight shifting in conjunction with tilt and recline.    Per RESNA white paper on elevating legrests, using elevating legrests and tilting more than 30 degrees in combination with full recline significantly improves lower leg hemodynamics status as measured by near-infrared spectroscopy (Whit et al., 2010)  Additionally, these legrests can improve ground clearance to navigate thresholds and slopes and still allowing the legs to achieve a tight 90 degree position for typical driving conditions. This position shortens the overall functional wheelbase for improved maneuverability    With extension for footplate-needed to safely support feet with chair use calf supports- angle and height adjustable pads that attach to the legrests. These padded calf supports are necessary to support her lower legs when the leg is elevated, or to keep feet from falling behind the footplates when in the neutral seated position. Calf supports are especially important when using a tilt-in-space power seating system and/or power articulating elevating legrests. The padding provides an additional surface to distribute pressure, and has a mild contour to accommodate the lower leg.    Samir bethea cryo seat cushion - this  pressure distribution and positioning seat cushion will optimally  distribute seating pressures to prevent pressure ulcers, but also provide a stable base of support for her to use during MRADLs.    2 Batteries and  - gel sealed, and two are necessary to power the wheelchair. They are maintenance free and are safe for travel on the road or in the air. They are necessary to provide reliable use of the power wheelchair on a single charge.    Backpack jacome for medical supplies- This is used to secure a medical necessity bag to carry essential items such as emergency medications, catheterization/toileting supplies, etc. The clips lock to provide a safe and secure means to transport medical items    This equipment is reasonable and necessary with reference to accepted standards of medical practice and treatment of this patient's condition and is not being recommended as a convenience item. Without this recommended equipment, she is highly likely to sustain injuries from falls, develop pressure sores or postural compensation, and/or be bed confined, which those costs far exceed the cost of the requested equipment.    Electronically signed by:  Kiara ACEVEDO, ATP      Occupational Therapist, Assistive   [phone]      fax: [phone]      [email or web address]  Seating Clinic- [facility], [address]  [place] [address]  September 27, 2023    I have read and concur with the above recommendations.    Physician Printed Name __________________________________________    Physician SIgnature  _____________________________________________    Date of SIgnature ______________________________    Physician Phone  ______________________________

## 2023-10-02 DIAGNOSIS — E89.0 POSTABLATIVE HYPOTHYROIDISM: ICD-10-CM

## 2023-10-03 RX ORDER — LEVOTHYROXINE SODIUM 100 UG/1
TABLET ORAL
Qty: 30 TABLET | Refills: 0 | Status: SHIPPED | OUTPATIENT
Start: 2023-10-03 | End: 2023-11-10

## 2023-10-04 NOTE — TELEPHONE ENCOUNTER
Called patient and reviewed TSH recheck plan and Levothyroxine refill.    Patient verbalized understanding and in agreement with plan.    Lab appt scheduled on 10/5/23 at 1445.  Visit date, time and location confirmed with patient.    Patient then stated she would like to submit urine sample to make sure she does not have a UTI; her Neurologist would need to know if she has UTI. Stated she believes symptoms she is experiencing are from MS but wants to test urine to make sure.    Informed patient there is a urine analysis order from Dr. Redman that is active.  Patient stated she will use that order.      SAEID LowN, RN-[facility]      
Looks like patient never saw [facility] [employer] message sent 8/31 to come in for lab only for TSH recheck.  Can you call her and relay message?    I'll refill medications for now.  Dr. Carolina Pulliam MD / [facility]    
How Severe Are Your Spot(S)?: moderate
Have Your Spot(S) Been Treated In The Past?: has not been treated
Hpi Title: Evaluation of Skin Lesions

## 2023-10-05 ENCOUNTER — LAB (OUTPATIENT)
Dept: LAB | Facility: CLINIC | Age: 68
End: 2023-10-05
Payer: MEDICARE

## 2023-10-05 DIAGNOSIS — E78.5 HYPERLIPIDEMIA WITH TARGET LDL LESS THAN 130: ICD-10-CM

## 2023-10-05 DIAGNOSIS — N18.31 STAGE 3A CHRONIC KIDNEY DISEASE (H): ICD-10-CM

## 2023-10-05 DIAGNOSIS — Z79.899 NEED FOR PROPHYLACTIC CHEMOTHERAPY: ICD-10-CM

## 2023-10-05 DIAGNOSIS — N39.0 RECURRENT URINARY TRACT INFECTION: ICD-10-CM

## 2023-10-05 DIAGNOSIS — N18.30 CKD (CHRONIC KIDNEY DISEASE) STAGE 3, GFR 30-59 ML/MIN (H): ICD-10-CM

## 2023-10-05 DIAGNOSIS — Z13.220 SCREENING FOR HYPERLIPIDEMIA: ICD-10-CM

## 2023-10-05 LAB
ALBUMIN UR-MCNC: NEGATIVE MG/DL
ANION GAP SERPL CALCULATED.3IONS-SCNC: 11 MMOL/L (ref 7–15)
APPEARANCE UR: CLEAR
BACTERIA #/AREA URNS HPF: ABNORMAL /HPF
BILIRUB UR QL STRIP: NEGATIVE
BUN SERPL-MCNC: 10.1 MG/DL (ref 8–23)
CALCIUM SERPL-MCNC: 9.8 MG/DL (ref 8.8–10.2)
CHLORIDE SERPL-SCNC: 97 MMOL/L (ref 98–107)
COLOR UR AUTO: YELLOW
CREAT SERPL-MCNC: 0.84 MG/DL (ref 0.51–0.95)
DEPRECATED HCO3 PLAS-SCNC: 28 MMOL/L (ref 22–29)
EGFRCR SERPLBLD CKD-EPI 2021: 75 ML/MIN/1.73M2
GLUCOSE SERPL-MCNC: 92 MG/DL (ref 70–99)
GLUCOSE UR STRIP-MCNC: NEGATIVE MG/DL
HGB UR QL STRIP: ABNORMAL
KETONES UR STRIP-MCNC: NEGATIVE MG/DL
LEUKOCYTE ESTERASE UR QL STRIP: ABNORMAL
NITRATE UR QL: NEGATIVE
PH UR STRIP: 6.5 [PH] (ref 5–7)
POTASSIUM SERPL-SCNC: 4.1 MMOL/L (ref 3.4–5.3)
RBC #/AREA URNS AUTO: ABNORMAL /HPF
SODIUM SERPL-SCNC: 136 MMOL/L (ref 135–145)
SP GR UR STRIP: 1.01 (ref 1–1.03)
SQUAMOUS #/AREA URNS AUTO: ABNORMAL /LPF
UROBILINOGEN UR STRIP-ACNC: 0.2 E.U./DL
WBC #/AREA URNS AUTO: ABNORMAL /HPF
YEAST #/AREA URNS HPF: ABNORMAL /HPF
YEAST #/AREA URNS HPF: ABNORMAL /HPF

## 2023-10-05 PROCEDURE — 80048 BASIC METABOLIC PNL TOTAL CA: CPT

## 2023-10-05 PROCEDURE — 87086 URINE CULTURE/COLONY COUNT: CPT

## 2023-10-05 PROCEDURE — 36415 COLL VENOUS BLD VENIPUNCTURE: CPT

## 2023-10-05 PROCEDURE — 87186 SC STD MICRODIL/AGAR DIL: CPT

## 2023-10-05 PROCEDURE — 87088 URINE BACTERIA CULTURE: CPT

## 2023-10-05 PROCEDURE — 81001 URINALYSIS AUTO W/SCOPE: CPT

## 2023-10-07 LAB — BACTERIA UR CULT: ABNORMAL

## 2023-10-18 ENCOUNTER — TELEPHONE (OUTPATIENT)
Dept: FAMILY MEDICINE | Facility: CLINIC | Age: 68
End: 2023-10-18
Payer: MEDICARE

## 2023-10-18 DIAGNOSIS — E89.0 POSTABLATIVE HYPOTHYROIDISM: Primary | ICD-10-CM

## 2023-10-18 NOTE — TELEPHONE ENCOUNTER
Patient called wanting to know the TSH level.  Chart review, TSH was not done during lab only appt scheduled for 10/5.  Patient has a future for TSH with Free T4 placed on file for future.  It appeared order (?).      Direct patient to contact Dr. Shah for UA test results.  Patient verbalized understood.    Patient made aware Dr. Pulliam not in clinic this week.  Will route encounter to covering provider to place a new order for patient to come in for test.

## 2023-10-18 NOTE — TELEPHONE ENCOUNTER
Pt informed that lab was ordered and scheduled for Friday 10/20.    Dasia LOUIS RN  [facility];

## 2023-10-20 ENCOUNTER — LAB (OUTPATIENT)
Dept: LAB | Facility: CLINIC | Age: 68
End: 2023-10-20
Payer: MEDICARE

## 2023-10-20 DIAGNOSIS — E89.0 POSTABLATIVE HYPOTHYROIDISM: ICD-10-CM

## 2023-10-20 DIAGNOSIS — G35 MULTIPLE SCLEROSIS (H): Primary | ICD-10-CM

## 2023-10-20 LAB
ALT SERPL W P-5'-P-CCNC: 8 U/L (ref 0–50)
AST SERPL W P-5'-P-CCNC: 19 U/L (ref 0–45)
CD19 CELLS # BLD: 117 CELLS/UL (ref 107–698)
CD19 CELLS NFR BLD: 13 % (ref 6–27)
CD3 CELLS # BLD: 704 CELLS/UL (ref 603–2990)
CD3 CELLS NFR BLD: 79 % (ref 49–84)
CD3+CD4+ CELLS # BLD: 300 CELLS/UL (ref 441–2156)
CD3+CD4+ CELLS NFR BLD: 34 % (ref 28–63)
CD3+CD4+ CELLS/CD3+CD8+ CLL BLD: 0.74 % (ref 1.4–2.6)
CD3+CD8+ CELLS # BLD: 404 CELLS/UL (ref 125–1312)
CD3+CD8+ CELLS NFR BLD: 45 % (ref 10–40)
CD3-CD16+CD56+ CELLS # BLD: 69 CELLS/UL (ref 95–640)
CD3-CD16+CD56+ CELLS NFR BLD: 8 % (ref 4–25)
HBV CORE IGM SERPL QL IA: NONREACTIVE
HBV SURFACE AG SERPL QL IA: NONREACTIVE
T CELL EXTENDED COMMENT: ABNORMAL
TSH SERPL DL<=0.005 MIU/L-ACNC: 0.39 UIU/ML (ref 0.3–4.2)

## 2023-10-20 PROCEDURE — 87340 HEPATITIS B SURFACE AG IA: CPT

## 2023-10-20 PROCEDURE — 82784 ASSAY IGA/IGD/IGG/IGM EACH: CPT

## 2023-10-20 PROCEDURE — 86359 T CELLS TOTAL COUNT: CPT | Mod: 59

## 2023-10-20 PROCEDURE — 86705 HEP B CORE ANTIBODY IGM: CPT

## 2023-10-20 PROCEDURE — 84443 ASSAY THYROID STIM HORMONE: CPT

## 2023-10-20 PROCEDURE — 86355 B CELLS TOTAL COUNT: CPT

## 2023-10-20 PROCEDURE — 82784 ASSAY IGA/IGD/IGG/IGM EACH: CPT | Mod: 59

## 2023-10-20 PROCEDURE — 36415 COLL VENOUS BLD VENIPUNCTURE: CPT

## 2023-10-20 PROCEDURE — 84450 TRANSFERASE (AST) (SGOT): CPT

## 2023-10-20 PROCEDURE — 86481 TB AG RESPONSE T-CELL SUSP: CPT

## 2023-10-20 PROCEDURE — 86357 NK CELLS TOTAL COUNT: CPT

## 2023-10-20 PROCEDURE — 84460 ALANINE AMINO (ALT) (SGPT): CPT

## 2023-10-20 PROCEDURE — 86360 T CELL ABSOLUTE COUNT/RATIO: CPT

## 2023-10-20 PROCEDURE — 82787 IGG 1 2 3 OR 4 EACH: CPT

## 2023-10-21 LAB
GAMMA INTERFERON BACKGROUND BLD IA-ACNC: 0 IU/ML
M TB IFN-G BLD-IMP: NEGATIVE
M TB IFN-G CD4+ BCKGRND COR BLD-ACNC: 3.43 IU/ML
MITOGEN IGNF BCKGRD COR BLD-ACNC: 0.18 IU/ML
MITOGEN IGNF BCKGRD COR BLD-ACNC: 0.25 IU/ML
QUANTIFERON MITOGEN: 3.43 IU/ML
QUANTIFERON NIL TUBE: 0 IU/ML
QUANTIFERON TB1 TUBE: 0.25 IU/ML
QUANTIFERON TB2 TUBE: 0.18

## 2023-10-23 DIAGNOSIS — G35 MULTIPLE SCLEROSIS (H): Primary | ICD-10-CM

## 2023-10-24 LAB
IGA SERPL-MCNC: 330 MG/DL (ref 84–499)
IGG SERPL-MCNC: 1279 MG/DL (ref 610–1616)
IGG SERPL-MCNC: 1279 MG/DL (ref 610–1616)
IGG1 SER-MCNC: 763 MG/DL (ref 382–929)
IGG2 SER-MCNC: 339 MG/DL (ref 242–700)
IGG3 SER-MCNC: 50 MG/DL (ref 22–176)
IGG4 SER-MCNC: 61 MG/DL (ref 4–86)
IGM SERPL-MCNC: 55 MG/DL (ref 35–242)
SUBCLASSES, PERCENT: 95 %

## 2023-10-25 ENCOUNTER — LAB (OUTPATIENT)
Dept: LAB | Facility: CLINIC | Age: 68
End: 2023-10-25
Payer: MEDICARE

## 2023-10-25 DIAGNOSIS — G35 MULTIPLE SCLEROSIS (H): ICD-10-CM

## 2023-10-25 LAB
Lab: NORMAL
PERFORMING LABORATORY: NORMAL
SPECIMEN STATUS: NORMAL
TEST NAME: NORMAL

## 2023-10-25 PROCEDURE — 36415 COLL VENOUS BLD VENIPUNCTURE: CPT

## 2023-10-25 PROCEDURE — 99000 SPECIMEN HANDLING OFFICE-LAB: CPT

## 2023-10-25 PROCEDURE — 83520 IMMUNOASSAY QUANT NOS NONAB: CPT | Mod: 90

## 2023-10-27 LAB — MAYO MISC RESULT: ABNORMAL

## 2023-11-10 DIAGNOSIS — E89.0 POSTABLATIVE HYPOTHYROIDISM: ICD-10-CM

## 2023-11-10 RX ORDER — LEVOTHYROXINE SODIUM 100 UG/1
TABLET ORAL
Qty: 30 TABLET | Refills: 0 | Status: SHIPPED | OUTPATIENT
Start: 2023-11-10 | End: 2024-01-04

## 2023-11-16 ENCOUNTER — HOSPITAL ENCOUNTER (INPATIENT)
Facility: CLINIC | Age: 68
LOS: 13 days | Discharge: HOME | DRG: 533 | End: 2023-11-30
Attending: EMERGENCY MEDICINE | Admitting: INTERNAL MEDICINE
Payer: MEDICARE

## 2023-11-16 ENCOUNTER — APPOINTMENT (OUTPATIENT)
Dept: GENERAL RADIOLOGY | Facility: CLINIC | Age: 68
DRG: 533 | End: 2023-11-16
Attending: EMERGENCY MEDICINE
Payer: MEDICARE

## 2023-11-16 DIAGNOSIS — G35 MS (MULTIPLE SCLEROSIS) (H): ICD-10-CM

## 2023-11-16 DIAGNOSIS — R41.82 ALTERED MENTAL STATUS, UNSPECIFIED ALTERED MENTAL STATUS TYPE: ICD-10-CM

## 2023-11-16 DIAGNOSIS — M25.561 ACUTE PAIN OF RIGHT KNEE: ICD-10-CM

## 2023-11-16 DIAGNOSIS — K59.00 CONSTIPATION, UNSPECIFIED CONSTIPATION TYPE: ICD-10-CM

## 2023-11-16 DIAGNOSIS — S72.491A OTHER CLOSED FRACTURE OF DISTAL END OF RIGHT FEMUR, INITIAL ENCOUNTER (H): Primary | ICD-10-CM

## 2023-11-16 DIAGNOSIS — T14.8XXA CONTUSION OF BONE: ICD-10-CM

## 2023-11-16 PROCEDURE — 99291 CRITICAL CARE FIRST HOUR: CPT | Mod: 25 | Performed by: STUDENT IN AN ORGANIZED HEALTH CARE EDUCATION/TRAINING PROGRAM

## 2023-11-16 PROCEDURE — 73562 X-RAY EXAM OF KNEE 3: CPT | Mod: LT

## 2023-11-16 PROCEDURE — 250N000013 HC RX MED GY IP 250 OP 250 PS 637: Performed by: EMERGENCY MEDICINE

## 2023-11-16 PROCEDURE — 72170 X-RAY EXAM OF PELVIS: CPT

## 2023-11-16 PROCEDURE — 99285 EMERGENCY DEPT VISIT HI MDM: CPT | Mod: 25 | Performed by: EMERGENCY MEDICINE

## 2023-11-16 PROCEDURE — 73610 X-RAY EXAM OF ANKLE: CPT | Mod: LT

## 2023-11-16 PROCEDURE — 99285 EMERGENCY DEPT VISIT HI MDM: CPT | Performed by: EMERGENCY MEDICINE

## 2023-11-16 RX ORDER — HYDROCODONE BITARTRATE AND ACETAMINOPHEN 5; 325 MG/1; MG/1
1 TABLET ORAL ONCE
Status: COMPLETED | OUTPATIENT
Start: 2023-11-16 | End: 2023-11-16

## 2023-11-16 RX ADMIN — HYDROCODONE BITARTRATE AND ACETAMINOPHEN 1 TABLET: 5; 325 TABLET ORAL at 23:57

## 2023-11-16 ASSESSMENT — ACTIVITIES OF DAILY LIVING (ADL): ADLS_ACUITY_SCORE: 39

## 2023-11-17 ENCOUNTER — APPOINTMENT (OUTPATIENT)
Dept: GENERAL RADIOLOGY | Facility: CLINIC | Age: 68
DRG: 533 | End: 2023-11-17
Attending: EMERGENCY MEDICINE
Payer: MEDICARE

## 2023-11-17 ENCOUNTER — APPOINTMENT (OUTPATIENT)
Dept: CT IMAGING | Facility: CLINIC | Age: 68
DRG: 533 | End: 2023-11-17
Attending: PSYCHIATRY & NEUROLOGY
Payer: MEDICARE

## 2023-11-17 PROBLEM — M25.561 ACUTE PAIN OF RIGHT KNEE: Status: ACTIVE | Noted: 2023-11-17

## 2023-11-17 PROBLEM — T14.8XXA CONTUSION OF BONE: Status: ACTIVE | Noted: 2023-11-17

## 2023-11-17 LAB
ABO/RH(D): NORMAL
ANION GAP SERPL CALCULATED.3IONS-SCNC: 7 MMOL/L (ref 7–15)
ANTIBODY SCREEN: NEGATIVE
APTT PPP: 30 SECONDS (ref 22–38)
BASOPHILS # BLD AUTO: 0 10E3/UL (ref 0–0.2)
BASOPHILS NFR BLD AUTO: 0 %
BUN SERPL-MCNC: 18.4 MG/DL (ref 8–23)
CALCIUM SERPL-MCNC: 9.6 MG/DL (ref 8.8–10.2)
CHLORIDE SERPL-SCNC: 97 MMOL/L (ref 98–107)
CREAT SERPL-MCNC: 1.74 MG/DL (ref 0.51–0.95)
DEPRECATED HCO3 PLAS-SCNC: 31 MMOL/L (ref 22–29)
EGFRCR SERPLBLD CKD-EPI 2021: 31 ML/MIN/1.73M2
EOSINOPHIL # BLD AUTO: 0 10E3/UL (ref 0–0.7)
EOSINOPHIL NFR BLD AUTO: 0 %
ERYTHROCYTE [DISTWIDTH] IN BLOOD BY AUTOMATED COUNT: 13.2 % (ref 10–15)
GLUCOSE BLDC GLUCOMTR-MCNC: 89 MG/DL (ref 70–99)
GLUCOSE SERPL-MCNC: 125 MG/DL (ref 70–99)
HCT VFR BLD AUTO: 24.5 % (ref 35–47)
HGB BLD-MCNC: 8.3 G/DL (ref 11.7–15.7)
IMM GRANULOCYTES # BLD: 0 10E3/UL
IMM GRANULOCYTES NFR BLD: 1 %
INR PPP: 1.26 (ref 0.85–1.15)
LYMPHOCYTES # BLD AUTO: 0.7 10E3/UL (ref 0.8–5.3)
LYMPHOCYTES NFR BLD AUTO: 12 %
MCH RBC QN AUTO: 31.6 PG (ref 26.5–33)
MCHC RBC AUTO-ENTMCNC: 33.9 G/DL (ref 31.5–36.5)
MCV RBC AUTO: 93 FL (ref 78–100)
MONOCYTES # BLD AUTO: 0.4 10E3/UL (ref 0–1.3)
MONOCYTES NFR BLD AUTO: 6 %
NEUTROPHILS # BLD AUTO: 4.7 10E3/UL (ref 1.6–8.3)
NEUTROPHILS NFR BLD AUTO: 81 %
NRBC # BLD AUTO: 0 10E3/UL
NRBC BLD AUTO-RTO: 0 /100
PLATELET # BLD AUTO: 171 10E3/UL (ref 150–450)
POTASSIUM SERPL-SCNC: 3.5 MMOL/L (ref 3.4–5.3)
RBC # BLD AUTO: 2.63 10E6/UL (ref 3.8–5.2)
SODIUM SERPL-SCNC: 135 MMOL/L (ref 135–145)
SPECIMEN EXPIRATION DATE: NORMAL
WBC # BLD AUTO: 5.8 10E3/UL (ref 4–11)

## 2023-11-17 PROCEDURE — 85025 COMPLETE CBC W/AUTO DIFF WBC: CPT | Performed by: EMERGENCY MEDICINE

## 2023-11-17 PROCEDURE — 120N000002 HC R&B MED SURG/OB UMMC

## 2023-11-17 PROCEDURE — 86901 BLOOD TYPING SEROLOGIC RH(D): CPT

## 2023-11-17 PROCEDURE — 80048 BASIC METABOLIC PNL TOTAL CA: CPT | Performed by: EMERGENCY MEDICINE

## 2023-11-17 PROCEDURE — 99223 1ST HOSP IP/OBS HIGH 75: CPT | Mod: AI | Performed by: PEDIATRICS

## 2023-11-17 PROCEDURE — 250N000011 HC RX IP 250 OP 636: Mod: JZ | Performed by: EMERGENCY MEDICINE

## 2023-11-17 PROCEDURE — 85610 PROTHROMBIN TIME: CPT

## 2023-11-17 PROCEDURE — 250N000013 HC RX MED GY IP 250 OP 250 PS 637: Performed by: PEDIATRICS

## 2023-11-17 PROCEDURE — 250N000011 HC RX IP 250 OP 636: Mod: JZ

## 2023-11-17 PROCEDURE — 36415 COLL VENOUS BLD VENIPUNCTURE: CPT | Performed by: EMERGENCY MEDICINE

## 2023-11-17 PROCEDURE — 250N000013 HC RX MED GY IP 250 OP 250 PS 637: Performed by: EMERGENCY MEDICINE

## 2023-11-17 PROCEDURE — 0QSGXZZ REPOSITION RIGHT TIBIA, EXTERNAL APPROACH: ICD-10-PCS | Performed by: STUDENT IN AN ORGANIZED HEALTH CARE EDUCATION/TRAINING PROGRAM

## 2023-11-17 PROCEDURE — 85730 THROMBOPLASTIN TIME PARTIAL: CPT

## 2023-11-17 PROCEDURE — 86850 RBC ANTIBODY SCREEN: CPT

## 2023-11-17 PROCEDURE — 250N000011 HC RX IP 250 OP 636: Performed by: PEDIATRICS

## 2023-11-17 PROCEDURE — 258N000003 HC RX IP 258 OP 636: Performed by: INTERNAL MEDICINE

## 2023-11-17 PROCEDURE — 86923 COMPATIBILITY TEST ELECTRIC: CPT | Performed by: NURSE PRACTITIONER

## 2023-11-17 PROCEDURE — 2W3LX2Z IMMOBILIZATION OF RIGHT LOWER EXTREMITY USING CAST: ICD-10-PCS | Performed by: STUDENT IN AN ORGANIZED HEALTH CARE EDUCATION/TRAINING PROGRAM

## 2023-11-17 PROCEDURE — G1010 CDSM STANSON: HCPCS

## 2023-11-17 PROCEDURE — 29345 APPLICATION OF LONG LEG CAST: CPT | Mod: RT | Performed by: STUDENT IN AN ORGANIZED HEALTH CARE EDUCATION/TRAINING PROGRAM

## 2023-11-17 PROCEDURE — 99222 1ST HOSP IP/OBS MODERATE 55: CPT | Mod: 25 | Performed by: STUDENT IN AN ORGANIZED HEALTH CARE EDUCATION/TRAINING PROGRAM

## 2023-11-17 PROCEDURE — 73552 X-RAY EXAM OF FEMUR 2/>: CPT | Mod: RT

## 2023-11-17 PROCEDURE — 82962 GLUCOSE BLOOD TEST: CPT

## 2023-11-17 PROCEDURE — 36415 COLL VENOUS BLD VENIPUNCTURE: CPT

## 2023-11-17 PROCEDURE — 250N000011 HC RX IP 250 OP 636: Performed by: EMERGENCY MEDICINE

## 2023-11-17 RX ORDER — NALOXONE HYDROCHLORIDE 0.4 MG/ML
INJECTION, SOLUTION INTRAMUSCULAR; INTRAVENOUS; SUBCUTANEOUS
Status: COMPLETED
Start: 2023-11-17 | End: 2023-11-17

## 2023-11-17 RX ORDER — LATANOPROST 50 UG/ML
1 SOLUTION/ DROPS OPHTHALMIC DAILY
Status: DISCONTINUED | OUTPATIENT
Start: 2023-11-17 | End: 2023-11-30 | Stop reason: HOSPADM

## 2023-11-17 RX ORDER — AMOXICILLIN 250 MG
1 CAPSULE ORAL 2 TIMES DAILY PRN
Status: DISCONTINUED | OUTPATIENT
Start: 2023-11-17 | End: 2023-11-19

## 2023-11-17 RX ORDER — ACYCLOVIR 400 MG/1
400 TABLET ORAL EVERY 12 HOURS
Status: DISCONTINUED | OUTPATIENT
Start: 2023-11-17 | End: 2023-11-19

## 2023-11-17 RX ORDER — ONDANSETRON 2 MG/ML
4 INJECTION INTRAMUSCULAR; INTRAVENOUS EVERY 6 HOURS PRN
Status: DISCONTINUED | OUTPATIENT
Start: 2023-11-17 | End: 2023-11-30 | Stop reason: HOSPADM

## 2023-11-17 RX ORDER — HEPARIN SODIUM 5000 [USP'U]/.5ML
5000 INJECTION, SOLUTION INTRAVENOUS; SUBCUTANEOUS EVERY 8 HOURS
Status: DISCONTINUED | OUTPATIENT
Start: 2023-11-17 | End: 2023-11-30 | Stop reason: HOSPADM

## 2023-11-17 RX ORDER — GABAPENTIN 300 MG/1
300 CAPSULE ORAL 2 TIMES DAILY
Status: DISCONTINUED | OUTPATIENT
Start: 2023-11-17 | End: 2023-11-30 | Stop reason: HOSPADM

## 2023-11-17 RX ORDER — ONDANSETRON 4 MG/1
4 TABLET, ORALLY DISINTEGRATING ORAL EVERY 6 HOURS PRN
Status: DISCONTINUED | OUTPATIENT
Start: 2023-11-17 | End: 2023-11-30 | Stop reason: HOSPADM

## 2023-11-17 RX ORDER — ACETAMINOPHEN 325 MG/1
650 TABLET ORAL EVERY 4 HOURS PRN
Status: DISCONTINUED | OUTPATIENT
Start: 2023-11-17 | End: 2023-11-17

## 2023-11-17 RX ORDER — NITROFURANTOIN 25; 75 MG/1; MG/1
100 CAPSULE ORAL DAILY
Status: DISCONTINUED | OUTPATIENT
Start: 2023-11-17 | End: 2023-11-19

## 2023-11-17 RX ORDER — ACETAMINOPHEN 500 MG
500-1000 TABLET ORAL EVERY 8 HOURS PRN
Status: DISCONTINUED | OUTPATIENT
Start: 2023-11-17 | End: 2023-11-30 | Stop reason: HOSPADM

## 2023-11-17 RX ORDER — BACLOFEN 20 MG/1
20 TABLET ORAL ONCE
Status: COMPLETED | OUTPATIENT
Start: 2023-11-17 | End: 2023-11-17

## 2023-11-17 RX ORDER — OXYBUTYNIN CHLORIDE 5 MG/1
10 TABLET, EXTENDED RELEASE ORAL
Status: DISCONTINUED | OUTPATIENT
Start: 2023-11-17 | End: 2023-11-21

## 2023-11-17 RX ORDER — ENOXAPARIN SODIUM 100 MG/ML
40 INJECTION SUBCUTANEOUS EVERY 24 HOURS
Status: DISCONTINUED | OUTPATIENT
Start: 2023-11-17 | End: 2023-11-17

## 2023-11-17 RX ORDER — OXYCODONE HYDROCHLORIDE 5 MG/1
5 TABLET ORAL EVERY 4 HOURS PRN
Status: DISCONTINUED | OUTPATIENT
Start: 2023-11-17 | End: 2023-11-20

## 2023-11-17 RX ORDER — MULTIPLE VITAMINS W/ MINERALS TAB 9MG-400MCG
1 TAB ORAL EVERY EVENING
Status: DISCONTINUED | OUTPATIENT
Start: 2023-11-17 | End: 2023-11-30 | Stop reason: HOSPADM

## 2023-11-17 RX ORDER — FERROUS SULFATE 325(65) MG
325 TABLET ORAL
Status: DISCONTINUED | OUTPATIENT
Start: 2023-11-17 | End: 2023-11-30 | Stop reason: HOSPADM

## 2023-11-17 RX ORDER — KETOROLAC TROMETHAMINE 15 MG/ML
15 INJECTION, SOLUTION INTRAMUSCULAR; INTRAVENOUS ONCE
Status: COMPLETED | OUTPATIENT
Start: 2023-11-17 | End: 2023-11-17

## 2023-11-17 RX ORDER — HYDROMORPHONE HYDROCHLORIDE 1 MG/ML
0.5 INJECTION, SOLUTION INTRAMUSCULAR; INTRAVENOUS; SUBCUTANEOUS
Status: COMPLETED | OUTPATIENT
Start: 2023-11-17 | End: 2023-11-17

## 2023-11-17 RX ORDER — CEFAZOLIN SODIUM 2 G/100ML
2 INJECTION, SOLUTION INTRAVENOUS SEE ADMIN INSTRUCTIONS
Status: CANCELLED | OUTPATIENT
Start: 2023-11-17

## 2023-11-17 RX ORDER — ACETAMINOPHEN 650 MG/1
650 SUPPOSITORY RECTAL EVERY 4 HOURS PRN
Status: DISCONTINUED | OUTPATIENT
Start: 2023-11-17 | End: 2023-11-17

## 2023-11-17 RX ORDER — SODIUM CHLORIDE 9 MG/ML
INJECTION, SOLUTION INTRAVENOUS CONTINUOUS
Status: DISCONTINUED | OUTPATIENT
Start: 2023-11-17 | End: 2023-11-19

## 2023-11-17 RX ORDER — LEVOTHYROXINE SODIUM 100 UG/1
100 TABLET ORAL
Status: DISCONTINUED | OUTPATIENT
Start: 2023-11-17 | End: 2023-11-17

## 2023-11-17 RX ORDER — HYDROCHLOROTHIAZIDE 12.5 MG/1
12.5 TABLET ORAL EVERY MORNING
Status: DISCONTINUED | OUTPATIENT
Start: 2023-11-17 | End: 2023-11-30 | Stop reason: HOSPADM

## 2023-11-17 RX ORDER — LEVOTHYROXINE SODIUM 50 UG/1
100 TABLET ORAL
Status: DISCONTINUED | OUTPATIENT
Start: 2023-11-18 | End: 2023-11-19

## 2023-11-17 RX ORDER — AMOXICILLIN 250 MG
2 CAPSULE ORAL 2 TIMES DAILY PRN
Status: DISCONTINUED | OUTPATIENT
Start: 2023-11-17 | End: 2023-11-19

## 2023-11-17 RX ORDER — BACLOFEN 10 MG/1
30 TABLET ORAL AT BEDTIME
Status: DISCONTINUED | OUTPATIENT
Start: 2023-11-17 | End: 2023-11-19

## 2023-11-17 RX ORDER — FAMOTIDINE 20 MG/1
40 TABLET, FILM COATED ORAL AT BEDTIME
Status: DISCONTINUED | OUTPATIENT
Start: 2023-11-17 | End: 2023-11-19

## 2023-11-17 RX ORDER — LIDOCAINE 40 MG/G
CREAM TOPICAL
Status: DISCONTINUED | OUTPATIENT
Start: 2023-11-17 | End: 2023-11-30 | Stop reason: HOSPADM

## 2023-11-17 RX ORDER — CEFAZOLIN SODIUM 2 G/100ML
2 INJECTION, SOLUTION INTRAVENOUS
Status: CANCELLED | OUTPATIENT
Start: 2023-11-17

## 2023-11-17 RX ORDER — BACLOFEN 10 MG/1
10 TABLET ORAL
Status: DISCONTINUED | OUTPATIENT
Start: 2023-11-17 | End: 2023-11-19

## 2023-11-17 RX ORDER — IBUPROFEN 200 MG
1 CAPSULE ORAL DAILY
Status: DISCONTINUED | OUTPATIENT
Start: 2023-11-17 | End: 2023-11-18

## 2023-11-17 RX ORDER — BACLOFEN 10 MG/1
20 TABLET ORAL DAILY
Status: DISCONTINUED | OUTPATIENT
Start: 2023-11-17 | End: 2023-11-18

## 2023-11-17 RX ADMIN — FERROUS SULFATE TAB 325 MG (65 MG ELEMENTAL FE) 325 MG: 325 (65 FE) TAB at 10:16

## 2023-11-17 RX ADMIN — HEPARIN SODIUM 5000 UNITS: 5000 INJECTION, SOLUTION INTRAVENOUS; SUBCUTANEOUS at 06:21

## 2023-11-17 RX ADMIN — GABAPENTIN 300 MG: 300 CAPSULE ORAL at 20:59

## 2023-11-17 RX ADMIN — CALCIUM 500 MG: 500 TABLET ORAL at 10:15

## 2023-11-17 RX ADMIN — BACLOFEN 20 MG: 20 TABLET ORAL at 00:46

## 2023-11-17 RX ADMIN — BACLOFEN 30 MG: 20 TABLET ORAL at 22:41

## 2023-11-17 RX ADMIN — MULTIPLE VITAMINS W/ MINERALS TAB 1 TABLET: TAB at 20:00

## 2023-11-17 RX ADMIN — OXYCODONE HYDROCHLORIDE 5 MG: 5 TABLET ORAL at 02:36

## 2023-11-17 RX ADMIN — HEPARIN SODIUM 5000 UNITS: 5000 INJECTION, SOLUTION INTRAVENOUS; SUBCUTANEOUS at 15:23

## 2023-11-17 RX ADMIN — FAMOTIDINE 40 MG: 20 TABLET ORAL at 21:34

## 2023-11-17 RX ADMIN — NITROFURANTOIN MONOHYDRATE/MACROCRYSTALS 100 MG: 25; 75 CAPSULE ORAL at 10:14

## 2023-11-17 RX ADMIN — HYDROMORPHONE HYDROCHLORIDE 0.5 MG: 1 INJECTION, SOLUTION INTRAMUSCULAR; INTRAVENOUS; SUBCUTANEOUS at 05:18

## 2023-11-17 RX ADMIN — OXYBUTYNIN CHLORIDE 10 MG: 10 TABLET, EXTENDED RELEASE ORAL at 10:14

## 2023-11-17 RX ADMIN — SODIUM CHLORIDE: 9 INJECTION, SOLUTION INTRAVENOUS at 18:30

## 2023-11-17 RX ADMIN — ACETAMINOPHEN 1000 MG: 325 TABLET, FILM COATED ORAL at 05:07

## 2023-11-17 RX ADMIN — ACYCLOVIR 400 MG: 400 TABLET ORAL at 22:40

## 2023-11-17 RX ADMIN — DULOXETINE 80 MG: 60 CAPSULE, DELAYED RELEASE ORAL at 10:15

## 2023-11-17 RX ADMIN — KETOROLAC TROMETHAMINE 15 MG: 15 INJECTION, SOLUTION INTRAMUSCULAR; INTRAVENOUS at 00:46

## 2023-11-17 RX ADMIN — GABAPENTIN 300 MG: 300 CAPSULE ORAL at 10:16

## 2023-11-17 RX ADMIN — HEPARIN SODIUM 5000 UNITS: 5000 INJECTION, SOLUTION INTRAVENOUS; SUBCUTANEOUS at 21:34

## 2023-11-17 RX ADMIN — NALOXONE HYDROCHLORIDE 0.3 MG: 0.4 INJECTION, SOLUTION INTRAMUSCULAR; INTRAVENOUS; SUBCUTANEOUS at 11:36

## 2023-11-17 RX ADMIN — BACLOFEN 20 MG: 20 TABLET ORAL at 10:15

## 2023-11-17 ASSESSMENT — ACTIVITIES OF DAILY LIVING (ADL)
ADLS_ACUITY_SCORE: 45
ADLS_ACUITY_SCORE: 39
ADLS_ACUITY_SCORE: 41
ADLS_ACUITY_SCORE: 45
ADLS_ACUITY_SCORE: 41
ADLS_ACUITY_SCORE: 41
ADLS_ACUITY_SCORE: 45
ADLS_ACUITY_SCORE: 45
ADLS_ACUITY_SCORE: 41

## 2023-11-17 NOTE — ED PROVIDER NOTES
"     Emergency Department Patient Sign-out       Brief HPI and ED course:  Patient is a 68 year old female signed out to me by the previous physician.  See initial ED Provider note for details of the presentation. In brief, 68-year-old female with a history of MS, CKD stage III and previous orthopedic surgeries who presented to the emergency department last night due to altered mental a fall, found to have evidence of a distal femur fracture and fibular fracture admitted to medicine due to her fall.    Vitals:   Patient Vitals for the past 24 hrs:   BP Temp Temp src Pulse Resp SpO2 Height Weight   11/17/23 0804 90/52 -- -- 73 16 100 % -- --   11/17/23 0627 90/47 -- -- -- -- -- -- --   11/17/23 0626 (!) 81/45 -- -- 85 16 97 % -- --   11/17/23 0600 98/47 -- -- 80 16 97 % -- --   11/17/23 0248 94/41 -- -- 88 16 99 % -- --   11/17/23 0244 -- 97.9  F (36.6  C) Oral -- -- -- 1.727 m (5' 8\") 63.5 kg (140 lb)   11/16/23 2202 100/52 -- -- 84 18 98 % -- --   11/16/23 2155 (!) 84/49 -- -- -- 18 -- -- --       Events after assuming care:  After care was assumed, a focused history and physical was performed. Agree with findings relayed by previous provider.   --      Patient was brought back from CAT scan and nursing called to ask for a physician at bedside due to her mental status.  Noted that she was significantly altered, difficult to arouse, and at times apneic.    Noted to be very sleepy, with small pupils, arousable only to painful stimuli and for very brief periods of time, and with borderline hypotension at 90/52.  Rate apneic into the low teens, and saturating 89% on room air.    On arrival, repeat point-of-care glucose found to be in the 80s.  She was given slow doses of Narcan for a total of 0.28 mg with significant improvement of her mental status, but still not in a significant amount of pain.  Able to move all extremities, now able to speak, and heart rate improving into the 70s, blood pressure improving into the " low 100s, saturation now 100% on 2 L.    Decision was made at that time also to obtain CT head as she has not had 1 done after her fall today.  But otherwise medicine is at bedside and will continue to take the remainder of her care from here     Subsequent Critical Care Addendum (Modifier -25)  This patient had an Emergency Department evaluation and management performed by  previous ED docs & admitted to medicine  at an earlier time that the patient did not require critical care. Subsequently, the patient's state changed such that critical care was medically necessary. The critical care provided was separate and distinct from the Emergency Department evaluation and management performed prior.     Based on my evaluation of the patient, Marylee Woods has altered mental status, which requires immediate intervention, and therefore she is critically ill.     The care team initiated medication therapy with narcan  to provide stabilization care. Due to the critical nature of this patient, I reassessed nursing observations, vital signs, physical exam, mental status, and neurologic status multiple times prior to her disposition.     Time also spent performing documentation, reviewing test results, discussion with consultants, and coordination of care.       Critical care time (excluding teaching time and procedures): 30 minutes.        --  Ana Seo MD   Emergency Medicine         Ana Seo MD  11/17/23 6868

## 2023-11-17 NOTE — PROGRESS NOTES
8MS ADMISSION    Patient admitted/transferred from ED via cart for Fracture of the distal metadiaphysis, history of MS and Graves disease. Upon arrival to the unit at 2:48 pm patient asleep, Patient s height, weight, and vital signs were obtained. Head to toe assessment completed. Care plan initiated. Vital signs stable upon admission, no facial expression of pain observed. Continue with plan of care. Notify provider with any concerns or changes in patient status.

## 2023-11-17 NOTE — H&P
[facility]    History and Physical - Hospitalist Service, GOLD TEAM        Date of Admission:  11/16/2023    Assessment & Plan      Marylee Woods is a 68 year old female with a history of MS and Graves disease admitted on 11/16/2023 after two falls. She is hemodynamically stable, but imaging shows lower extremity fractures. She was admitted for further management.      # Mechanical fall  # Suspected L medial malleolus fracture  # Fracture of the distal R femoral metadiaphysis   Fell out of wheelchair onto knees when going over a curb then fell out of her wheelchair a second time. Imaging read suggests fracture of L medial malleolus and distal R femoral metadiaphysis. Knee films still in process.    - Bilateral knee XR in process  - Ortho consult placed  - PT and OT orders placed  - Pain control with PRN tylenol and oxycodone  -  consult for dispo planning    # Multiple sclerosis  - Continue home gabapentin 300 mg BID  - Continue home baclofen 20 mg qAM, 10 mg qPM PRN, and 30 mg at bedtime   - Continue home oxybutynin 10 mg BID  - Continue home nitrofurntoin ppx 100 mg daily and acyclovir ppx 400 mg BID    # Graves Disease  - Continue home levothyroxine 100 mcg daily     # Sleep disorder  - Continue home armodafinil 200 mg daily    # GERD  - Continue home famotidine 40 mg daily    # HTN  - Hold home hydrochlorothiazide 12.5 mg daily due to softer blood pressures    # Depression  - Continue home duloxetine 80 mg daily    # Ductal carcinoma in situ  - Follow with outpatient radiation oncology and medical oncology (annual follow-up) as scheduled        Observation Goals: -adequate pain control on oral analgesics, -returns to baseline functional status, -safe disposition plan has been identified, Nurse to notify provider when observation goals have been met and patient is ready for discharge.  Diet: Regular Diet Adult    DVT Prophylaxis: Heparin in case of ortho  procedure  Verduzco Catheter: Not present  Lines: None     Cardiac Monitoring: None  Code Status: Full Code      Clinically Significant Risk Factors Present on Admission                                  Disposition Plan      Expected Discharge Date: 11/18/2023                  YASMIN SCHOFIELD MD  Hospitalist Service, [facility]  Securely message with [employer] (more info)  Text page via [facility] Paging/Directory   See signed in provider for up to date coverage information    ______________________________________________________________________    Chief Complaint   Fall    History is obtained from the patient    History of Present Illness   Marylee Woods is a 68 year old female with a history of MS, Graves disease, and atypical ductal carcinoma of the L breast s/p lumpectomy admitted after a mechanical fall. She fell onto both knees from her wheelchair when going downhill over a slight curb earlier today. She then had a second fall out of her wheelchair onto her R knee after her  accidentally let go of her wheelchair when they were going downhill. She was holding her grandson during both of these falls and remembers falling on her knees then her R hip. She does not remember hitting her head during either fall. She notes that her ROM is now worse than her baseline and she has new swelling over her R knee. She endorses pain in her L ankle, L knee, R knee, and R hip.     She does smoke about 1 pack of cigarettes per week and declines a nicotine patch, as she says this has made her cravings worse in the past.     ED Course:  In the ED, vitals were HR 84, RR 18, BP 84/49, and SpO2 98% on RA. CBC and BMP have been ordered and will be drawn shortly. XR L ankle shows a likely nondisplaced fracture of the medial malleolus. XR of her R femur shows an acute comminuted and impacted fracture of the distal R femoral metadiaphysis. XRs of her knees are still in process.  She was given her home baclofen, norco x1 and toradol x1 then admitted for further management.       Past Medical History    Past Medical History:   Diagnosis Date    Atypical ductal hyperplasia of left breast 02/2022    Depression     Gastro-oesophageal reflux disease     Graves disease     Multiple sclerosis (H)     Osteoporosis     Postablative hypothyroidism        Past Surgical History   Past Surgical History:   Procedure Laterality Date    C/SECTION, LOW TRANSVERSE  1992    COLONOSCOPY  2007    COLONOSCOPY N/A 1/26/2017    Procedure: COMBINED COLONOSCOPY, SINGLE OR MULTIPLE BIOPSY/POLYPECTOMY BY BIOPSY;  Surgeon: Mayo Adkins MD, MD;  Location:  GI    ESOPHAGOSCOPY, GASTROSCOPY, DUODENOSCOPY (EGD), COMBINED  1/26/2017    Dr. Adkins [facility]    ESOPHAGOSCOPY, GASTROSCOPY, DUODENOSCOPY (EGD), COMBINED N/A 1/26/2017    Procedure: COMBINED ESOPHAGOSCOPY, GASTROSCOPY, DUODENOSCOPY (EGD), BIOPSY SINGLE OR MULTIPLE;  Surgeon: Mayo Adkins MD, MD;  Location:  GI    ESOPHAGOSCOPY, GASTROSCOPY, DUODENOSCOPY (EGD), COMBINED N/A 4/20/2023    Procedure: ESOPHAGOGASTRODUODENOSCOPY, WITH BIOPSY;  Surgeon: Cristina Zuniga MD;  Location:  GI    HIP SURGERY  2009    femur ortho surgery    LAPAROSCOPIC CHOLECYSTECTOMY  6/24/2014    Procedure: LAPAROSCOPIC CHOLECYSTECTOMY;  Surgeon: Randy Bailey MD;  Location:  SD    LUMPECTOMY BREAST Left 3/31/2022    Procedure: LEFT Radiofrequency Identification Seed-Localized Lumpectomy;  Surgeon: Amanda Liu MD;  [place] [facility] [place]    OPEN REDUCTION INTERNAL FIXATION FEMUR DISTAL Left 11/1/2018    Procedure: OPEN REDUCTION INTERNAL FIXATION LEFT FEMUR DISTAL;  Surgeon: Jose Valadez MD;  Location:  OR    OPEN REDUCTION INTERNAL FIXATION RODDING INTRAMEDULLARY TIBIA  4/14/2013    Procedure: OPEN REDUCTION INTERNAL FIXATION RODDING INTRAMEDULLARY TIBIA;;  Surgeon: Sajan Cast MD;  Location:  OR    ORTHOPEDIC SURGERY  2009    surgery  right upper femur fx near hip       Prior to Admission Medications   Prior to Admission Medications   Prescriptions Last Dose Informant Patient Reported? Taking?   Armodafinil 200 MG TABS   Yes No   Sig: Take 1 tablet by mouth daily at 2 pm   DULoxetine (CYMBALTA) 20 MG capsule   No No   Sig: Take 1 capsule (20 mg) by mouth daily Take along with 60 mg for a total of 80 mg   DULoxetine (CYMBALTA) 60 MG capsule   No No   Sig: TAKE 1 CAPSULE BY MOUTH EVERY DAY   acetaminophen (TYLENOL) 500 MG tablet   No No   Sig: Take 1-2 tablets (500-1,000 mg) by mouth every 8 hours as needed for mild pain or fever (greater than 101 degrees)   acyclovir (ZOVIRAX) 400 MG tablet   No No   Sig: Take 1 tablet (400 mg) by mouth every 12 hours   baclofen (LIORESAL) 10 MG tablet   Yes No   Si times daily Take 2 tablets (20 MG) by mouth every morning, 1 tablet (10 MG) by mouth daily in the afternoon as needed, and 3 tablets (30 MG) by mouth every evening before bedtime.   calcium carbonate 500 mg, elemental, (OSCAL 500) 1250 (500 Ca) MG TABS tablet   Yes No   Sig: Take 1 tablet by mouth daily   famotidine (PEPCID) 40 MG tablet   No No   Sig: Take 1 tablet (40 mg) by mouth At Bedtime   ferrous sulfate (FEROSUL) 325 (65 Fe) MG tablet   Yes No   Sig: Take 325 mg by mouth daily (with breakfast)   gabapentin (NEURONTIN) 300 MG capsule   Yes No   Sig: Take 300 mg by mouth 2 times daily Take 2 tablets (600 MG) by mouth 3-4 times daily.   guaiFENesin-codeine (ROBITUSSIN AC) 100-10 MG/5ML solution   No No   Sig: Take 5-10 mLs by mouth nightly as needed for cough   Patient not taking: Reported on 2023   hydrochlorothiazide (HYDRODIURIL) 12.5 MG tablet   No No   Sig: Take 1 tablet (12.5 mg) by mouth every morning   latanoprost (XALATAN) 0.005 % ophthalmic solution   Yes No   Sig: INSTILL 1 DROP INTO BOTH EYES EVERY DAY AS DIRECTED PT WILL NEED TO BE SEEN FOR FUTURE REFILLS   levothyroxine (SYNTHROID/LEVOTHROID) 100 MCG tablet   No No   Sig:  TAKE 1 TABLET BY MOUTH 6 TIMES A WEEK   multivitamin w/minerals (THERA-VIT-M) tablet   Yes No   Sig: Take 1 tablet by mouth every evening   nitroFURantoin macrocrystal-monohydrate (MACROBID) 100 MG capsule   No No   Sig: Take 1 capsule (100 mg) by mouth daily   oxybutynin (DITROPAN-XL) 10 MG 24 hr tablet   No No   Sig: Take 1 tablet (10 mg) by mouth 2 times daily      Facility-Administered Medications: None        Allergies   Allergies   Allergen Reactions    Amantadine      hallucinations    Budesonide     Budesonide-Formoterol Fumarate Rash        Physical Exam   Vital Signs:     BP: 100/52 Pulse: 84   Resp: 18 SpO2: 98 % O2 Device: None (Room air)    Weight: 0 lbs 0 oz    Constitutional: Awake, alert, resting in bed in no acute distress  Eyes: extra-ocular muscles intact and sclera clear  ENT: normocepalic, without obvious abnormality, MMM  Respiratory: No increased work of breathing, good air exchange, clear to auscultation bilaterally, no crackles or wheezing  Cardiovascular: regular rate and rhythm, normal S1 and S2, and no murmur noted  GI: normal bowel sounds, soft, non-distended, and non-tender  Skin: normal skin color, texture, turgor  Musculoskeletal: contractures noted, swelling of R knee  Neurologic: Awake, alert, oriented, moving bilateral upper extremities, diffuse bilateral weakness of lower extremities     Medical Decision Making       80 MINUTES SPENT BY ME on the date of service doing chart review, history, exam, documentation & further activities per the note.      Data         Imaging results reviewed over the past 24 hrs:   Recent Results (from the past 24 hour(s))   XR Knee Left 3 Views    Narrative    EXAM: XR KNEE LEFT 3 VIEWS  LOCATION: [facility]  DATE: 11/17/2023    INDICATION: Fall, pain and swelling. History of MS.  COMPARISON: X-ray left femur 2 views (4 films) 03/22/2019 at 1316 hours.      Impression    IMPRESSION: Mild tricompartmental  degenerative arthritis left knee without acute fracture, dislocation, effusion or calcified loose bodies. Postoperative changes of plate and screw fixation of previously healed distal left femoral metaphyseal fracture,   interval healing of the fracture lines. Intramedullary nail with proximal locking screws in the visualized left tibia. Visualized hardware is well seated with good alignment. No acute fracture or dislocation. Healed fracture deformity of the proximal   metaphysis of the left fibula. No x-ray evidence of avascular necrosis. Demineralization of the visualized bones.   XR Ankle Left G/E 3 Views    Narrative    EXAM: XR ANKLE LEFT G/E 3 VIEWS  LOCATION: [facility]  DATE: 11/17/2023    INDICATION: fall, pain, swelling, history of MS  COMPARISON: None.      Impression    IMPRESSION: Probable nondisplaced fracture of the medial malleolus. No other acute fracture or dislocation. The ankle mortise is intact. Postoperative change in the distal tibia and fibula. The bones are demineralized.   XR Pelvis 1/2 Views    Narrative    EXAM: XR PELVIS 1/2 VIEWS  LOCATION: [facility]  DATE: 11/17/2023    INDICATION: Fall. Swelling. History of MS.  COMPARISON: X-ray abdomen 2 views (3 films) 06/30/2021 at 1509 hours.      Impression    IMPRESSION: Demineralization of the visualized bones. Degenerative changes lower lumbar spine and joints of the pelvis. No acute fracture or dislocation. Dynamic hip screw and sideplate proximal right femur, partially visualized, unchanged. Prominent   amount of formed stool material within the visualized redundant colon.   XR Knee Right 3 Views    Narrative    EXAM: XR KNEE RIGHT 3 VIEWS  LOCATION: [facility]  DATE: 11/17/2023    INDICATION: Pain after fall.  COMPARISON: Pelvis and right hip radiographs 10/30/2017.      Impression    IMPRESSION: Bones  are demineralized. Acute comminuted and impacted fracture of the distal right femoral metadiaphysis without definite joint involvement. No significant joint effusion. Post ORIF of the proximal right femur. Hardware in satisfactory   position without complication detected.     XR Femur Right 2 Views    Narrative    EXAM: XR FEMUR RIGHT 2 VIEWS  LOCATION: [facility]  DATE: 11/17/2023    INDICATION: Pain after fall.  COMPARISON: Pelvis and right hip radiographs 10/30/2017.      Impression    IMPRESSION: Bones are demineralized. Acute comminuted and impacted fracture of the distal right femoral metadiaphysis without definite joint involvement. No significant joint effusion. Post ORIF of the proximal right femur. Hardware in satisfactory   position without complication detected.

## 2023-11-17 NOTE — CONSULTS
[facility]  Orthopedic Surgery Consult    Name: Marylee Woods  Age: 68 year old  MRN: [id number]  YOB: 1955    Reason for Consult: Right distal femur fracture, left medial malleolus     Requesting Provider: Ml Thompson MD    Assessment and Plan:     Assessment:  Marylee Woods is a 68 y.o. primarily wheelchair bound woman with PMH of MS and Graves disease who was admitted after falls with a displaced, comminuted right distal femur fracture and left minimally displaced medial malleolus fracture. Patient has a significant amount of hardware from prior orthopedic injuries.  Osteopenic appearing bone.  Wheelchair-bound.  Given patient's lower demand lifestyle and prior hardware in the right femur, concern of surgical intervention is the creation of a stress riser or increasing risk for a new fracture along with the risks associated with surgery/ORIF.  Alternatively we discussed the options of nonsurgical treatment.  After discussing these options with the patient it was decided to proceed with nonsurgical treatment.  The right lower extremity of patient was placed in a long leg cast at this time. With regards to her medial malleolus fracture, again given lower demand will plan to treat in a CAM boot at this time. Will plan to have the patient follow-up in the outpatient setting to follow fracture healing. Patient's  was spoken to on the phone and expressed understanding of this plan.     Procedure: Closed reduction and long-leg casting      Prior to start of the procedure informed consent was obtained after discussing the risks benefits and alternatives with patient's . Neurovascular status prior to the procedure was checked and no deficits were noted.  A preprocedure timeout was performed with all members of the care team identifying the correct patient, laterality, and procedure to be performed.  Imaging was available.  No additional sedation was required.   A well-padded with 6-7 layer thick long-leg cast was applied.  The patient tolerated the procedure well.  No complications were noted. Neurovascular status was assessed post procedure and found to be normal and unchanged.      Plan:  Medicine primary  - Plan for OR: No plan for OR  - Anticoagulation/DVT prophylaxis: Per primary  - Antibiotics: None indicated  - Imaging: Post-cast xrays pending  - Activity: As tolerated with weight bearing restrictions. Recommend elevating extremities while in bed.   - Weight bearing: NWB BLE  - Pain control: Per primary  - Diet: Ok for a diet from orthopedic standpoint  - Brace/Cast: Long leg cast RLE, CAM boot LLE. Recommend skin checks over the left ankle periodically.  - Follow-up: In 1 week with Dr. Em with new xrays  - Disposition: Pending safe discharge plan, PT/OT recommendations, medical stability.     Staff: Jero Em MD    Respectfully,    Anirudh Villarreal MD  Orthopedic Surgery PGY1  [phone]    Please page me directly with any questions/concerns during regular weekday hours before 5 pm. If there is no response, if it is a weekend, or if it is during evening hours then please page the orthopedic surgery resident on call.    History of Present Illness:     Patient was seen and examined by me. History, PMH, Meds, SH, complete ROS (10 organ systems) and PE reviewed with patient and prior medical records.      History limited secondary to patient's sedated status on evaluation.     Marylee Woods is a 68 y.o. woman with PMH of MS, Graves disease, and multiple orthopedic surgeries to her bilateral lower extremities who sustained a mechanical fall from her wheelchair. She was unable to stop herself going downhill holding her grandson and her  was unable to stop the wheelchair before she fell onto her right knee. Did not hit her head. No other injuries reported by EM staff.      Past Medical History:     Past Medical History:   Diagnosis Date    Atypical  ductal hyperplasia of left breast 02/2022    Depression     Gastro-oesophageal reflux disease     Graves disease     Multiple sclerosis (H)     Osteoporosis     Postablative hypothyroidism        Past Surgical History:     Past Surgical History:   Procedure Laterality Date    C/SECTION, LOW TRANSVERSE  1992    COLONOSCOPY  2007    COLONOSCOPY N/A 1/26/2017    Procedure: COMBINED COLONOSCOPY, SINGLE OR MULTIPLE BIOPSY/POLYPECTOMY BY BIOPSY;  Surgeon: Mayo Adkins MD, MD;  Location:  GI    ESOPHAGOSCOPY, GASTROSCOPY, DUODENOSCOPY (EGD), COMBINED  1/26/2017    Dr. Adkins [facility]    ESOPHAGOSCOPY, GASTROSCOPY, DUODENOSCOPY (EGD), COMBINED N/A 1/26/2017    Procedure: COMBINED ESOPHAGOSCOPY, GASTROSCOPY, DUODENOSCOPY (EGD), BIOPSY SINGLE OR MULTIPLE;  Surgeon: Mayo Adkins MD, MD;  Location:  GI    ESOPHAGOSCOPY, GASTROSCOPY, DUODENOSCOPY (EGD), COMBINED N/A 4/20/2023    Procedure: ESOPHAGOGASTRODUODENOSCOPY, WITH BIOPSY;  Surgeon: Cristina Zuniga MD;  Location: [facility]    HIP SURGERY  2009    femur ortho surgery    LAPAROSCOPIC CHOLECYSTECTOMY  6/24/2014    Procedure: LAPAROSCOPIC CHOLECYSTECTOMY;  Surgeon: Randy Bailey MD;  Location:  [facility]    LUMPECTOMY BREAST Left 3/31/2022    Procedure: LEFT Radiofrequency Identification Seed-Localized Lumpectomy;  Surgeon: Amanda Liu MD;  Location: [facility] OR    OPEN REDUCTION INTERNAL FIXATION FEMUR DISTAL Left 11/1/2018    Procedure: OPEN REDUCTION INTERNAL FIXATION LEFT FEMUR DISTAL;  Surgeon: Jose Valadez MD;  Location: [facility] OR    OPEN REDUCTION INTERNAL FIXATION RODDING INTRAMEDULLARY TIBIA  4/14/2013    Procedure: OPEN REDUCTION INTERNAL FIXATION RODDING INTRAMEDULLARY TIBIA;;  Surgeon: Sajan Cast MD;  Location: [facility] OR    ORTHOPEDIC SURGERY  2009    surgery right upper femur fx near hip       Social History:   1 pack of cigarettes per week  Social History     Socioeconomic History    Marital status:      Spouse name: None     Number of children: None    Years of education: None    Highest education level: None   Tobacco Use    Smoking status: Every Day     Packs/day: .25     Types: Cigarettes     Last attempt to quit: 2022     Years since quittin.8    Smokeless tobacco: Never   Vaping Use    Vaping Use: Never used   Substance and Sexual Activity    Alcohol use: Yes     Alcohol/week: 0.0 standard drinks of alcohol     Comment: occasional     Drug use: No    Sexual activity: Yes     Partners: Male       Family History:     Family History   Problem Relation Age of Onset    Heart Disease Maternal Grandmother     Cancer Maternal Grandfather     Heart Disease Paternal Grandfather     Heart Disease Mother     Heart Disease Father     Diabetes No family hx of     Coronary Artery Disease No family hx of     Hypertension No family hx of     Hyperlipidemia No family hx of     Cerebrovascular Disease No family hx of     Breast Cancer No family hx of     Colon Cancer No family hx of     Prostate Cancer No family hx of     Other Cancer No family hx of     Depression No family hx of     Anxiety Disorder No family hx of     Mental Illness No family hx of     Substance Abuse No family hx of     Anesthesia Reaction No family hx of     Asthma No family hx of     Osteoporosis No family hx of     Genetic Disorder No family hx of     Thyroid Disease No family hx of     Obesity No family hx of     Unknown/Adopted No family hx of        Medications:     Current Facility-Administered Medications   Medication    acetaminophen (TYLENOL) tablet 500-1,000 mg    acyclovir (ZOVIRAX) tablet 400 mg    Armodafinil TABS 1 tablet    baclofen (LIORESAL) tablet 10 mg    baclofen (LIORESAL) tablet 20 mg    baclofen (LIORESAL) tablet 30 mg    calcium carbonate 500 mg (elemental) (OSCAL 500) tablet 500 mg    DULoxetine (CYMBALTA) DR capsule 80 mg    famotidine (PEPCID) tablet 40 mg    ferrous sulfate (FEROSUL) tablet 325 mg    gabapentin (NEURONTIN) capsule 300 mg     heparin ANTICOAGULANT injection 5,000 Units    [Held by provider] hydrochlorothiazide (HYDRODIURIL) tablet 12.5 mg    latanoprost (XALATAN) 0.005 % ophthalmic solution 1 drop    levothyroxine (SYNTHROID/LEVOTHROID) tablet 100 mcg    lidocaine (LMX4) cream    lidocaine 1 % 0.1-1 mL    melatonin tablet 1 mg    multivitamin w/minerals (THERA-VIT-M) tablet 1 tablet    nitroFURantoin macrocrystal-monohydrate (MACROBID) capsule 100 mg    ondansetron (ZOFRAN ODT) ODT tab 4 mg    Or    ondansetron (ZOFRAN) injection 4 mg    oxyBUTYnin ER (DITROPAN XL) 24 hr tablet 10 mg    oxyCODONE (ROXICODONE) tablet 5 mg    oxyCODONE IR (ROXICODONE) half-tab 2.5 mg    senna-docusate (SENOKOT-S/PERICOLACE) 8.6-50 MG per tablet 1 tablet    Or    senna-docusate (SENOKOT-S/PERICOLACE) 8.6-50 MG per tablet 2 tablet    sodium chloride (PF) 0.9% PF flush 3 mL    sodium chloride (PF) 0.9% PF flush 3 mL     Current Outpatient Medications   Medication Sig    acetaminophen (TYLENOL) 500 MG tablet Take 1-2 tablets (500-1,000 mg) by mouth every 8 hours as needed for mild pain or fever (greater than 101 degrees)    acyclovir (ZOVIRAX) 400 MG tablet Take 1 tablet (400 mg) by mouth every 12 hours    Armodafinil 200 MG TABS Take 1 tablet by mouth daily at 2 pm    baclofen (LIORESAL) 10 MG tablet 2 times daily Take 2 tablets (20 MG) by mouth every morning, 1 tablet (10 MG) by mouth daily in the afternoon as needed, and 3 tablets (30 MG) by mouth every evening before bedtime.    calcium carbonate 500 mg, elemental, (OSCAL 500) 1250 (500 Ca) MG TABS tablet Take 1 tablet by mouth daily    DULoxetine (CYMBALTA) 20 MG capsule Take 1 capsule (20 mg) by mouth daily Take along with 60 mg for a total of 80 mg    DULoxetine (CYMBALTA) 60 MG capsule TAKE 1 CAPSULE BY MOUTH EVERY DAY    famotidine (PEPCID) 40 MG tablet Take 1 tablet (40 mg) by mouth At Bedtime    ferrous sulfate (FEROSUL) 325 (65 Fe) MG tablet Take 325 mg by mouth daily (with breakfast)    gabapentin  "(NEURONTIN) 300 MG capsule Take 300 mg by mouth 2 times daily Take 2 tablets (600 MG) by mouth 3-4 times daily.    guaiFENesin-codeine (ROBITUSSIN AC) 100-10 MG/5ML solution Take 5-10 mLs by mouth nightly as needed for cough (Patient not taking: Reported on 6/22/2023)    hydrochlorothiazide (HYDRODIURIL) 12.5 MG tablet Take 1 tablet (12.5 mg) by mouth every morning    latanoprost (XALATAN) 0.005 % ophthalmic solution INSTILL 1 DROP INTO BOTH EYES EVERY DAY AS DIRECTED PT WILL NEED TO BE SEEN FOR FUTURE REFILLS    levothyroxine (SYNTHROID/LEVOTHROID) 100 MCG tablet TAKE 1 TABLET BY MOUTH 6 TIMES A WEEK    multivitamin w/minerals (THERA-VIT-M) tablet Take 1 tablet by mouth every evening    nitroFURantoin macrocrystal-monohydrate (MACROBID) 100 MG capsule Take 1 capsule (100 mg) by mouth daily    oxybutynin (DITROPAN-XL) 10 MG 24 hr tablet Take 1 tablet (10 mg) by mouth 2 times daily       Allergies:     Allergies   Allergen Reactions    Amantadine      hallucinations    Budesonide     Budesonide-Formoterol Fumarate Rash       Review of Systems:     ROS limited secondary to patient's sedated status.      Physical Exam:     Vital Signs: BP 90/52   Pulse 73   Temp 97.9  F (36.6  C) (Oral)   Resp 16   Ht 1.727 m (5' 8\")   Wt 63.5 kg (140 lb)   LMP  (LMP Unknown)   SpO2 100%   BMI 21.29 kg/m    General: Snoring, lying in bed, not responsive to voice, movement or painful stimuli, no apparent distress, appears stated age.    Musculoskeletal:  BUE: No gross deformity. Skin intact. No palpable crepitus. No responses to pain.  Radial pulse 2+ and fingers warm and well-perfused with <2 seconds capillary refill.  BLE: Swelling about the right knee.  Skin intact. No palpable crepitus. No response to pain around the right knee or left ankle.  Dorsalis pedis and posterior tibial arteries 1+, foot warm and well perfused.      Imaging:     Xrays of the right knee demonstrate a shortened comminuted distal femoral shaft fracture " with an intercondylar split with disruption of the articular surface.     CT of the right knee demonstrates significant comminution of the distal femur fracture visualized in the above xray    Left ankle xrays demonstrate minimal displacement of the medial malleolus with associated distal tibial hardware.     Data:     CBC:  Lab Results   Component Value Date    WBC 5.8 11/17/2023    HGB 8.3 (L) 11/17/2023     11/17/2023     BMP:  Lab Results   Component Value Date     11/17/2023    POTASSIUM 3.5 11/17/2023    CHLORIDE 97 (L) 11/17/2023    CO2 31 (H) 11/17/2023    BUN 18.4 11/17/2023    CR 1.74 (H) 11/17/2023    ANIONGAP 7 11/17/2023    JORDON 9.6 11/17/2023     (H) 11/17/2023     Inflammatory Markers:  Lab Results   Component Value Date    WBC 5.8 11/17/2023    WBC 3.6 (L) 03/09/2023    WBC 9.8 07/02/2021    CRP <2.9 06/30/2021    CRP 51.7 (H) 11/10/2011    SED 17 07/01/2021

## 2023-11-17 NOTE — ED TRIAGE NOTES
Patient fell out of her wheelchair over a curb onto her knees and then fell out again onto her right side when trying to get back home.      Triage Assessment (Adult)       Row Name 11/16/23 1252          Triage Assessment    Airway WDL WDL        Respiratory WDL    Respiratory WDL WDL        Skin Circulation/Temperature WDL    Skin Circulation/Temperature WDL WDL        Cardiac WDL    Cardiac WDL WDL        Peripheral/Neurovascular WDL    Peripheral Neurovascular WDL WDL        Cognitive/Neuro/Behavioral WDL    Cognitive/Neuro/Behavioral WDL WDL

## 2023-11-18 ENCOUNTER — APPOINTMENT (OUTPATIENT)
Dept: CT IMAGING | Facility: CLINIC | Age: 68
DRG: 533 | End: 2023-11-18
Attending: NURSE PRACTITIONER
Payer: MEDICARE

## 2023-11-18 ENCOUNTER — ANESTHESIA EVENT (OUTPATIENT)
Dept: INTENSIVE CARE | Facility: CLINIC | Age: 68
DRG: 533 | End: 2023-11-18
Payer: MEDICARE

## 2023-11-18 ENCOUNTER — ANESTHESIA (OUTPATIENT)
Dept: INTENSIVE CARE | Facility: CLINIC | Age: 68
DRG: 533 | End: 2023-11-18
Payer: MEDICARE

## 2023-11-18 ENCOUNTER — APPOINTMENT (OUTPATIENT)
Dept: GENERAL RADIOLOGY | Facility: CLINIC | Age: 68
DRG: 533 | End: 2023-11-18
Attending: NURSE PRACTITIONER
Payer: MEDICARE

## 2023-11-18 ENCOUNTER — APPOINTMENT (OUTPATIENT)
Dept: GENERAL RADIOLOGY | Facility: CLINIC | Age: 68
DRG: 533 | End: 2023-11-18
Attending: PSYCHIATRY & NEUROLOGY
Payer: MEDICARE

## 2023-11-18 LAB
ALBUMIN SERPL BCG-MCNC: 3.4 G/DL (ref 3.5–5.2)
ALBUMIN UR-MCNC: 20 MG/DL
ALLEN'S TEST: NO
ALP SERPL-CCNC: 61 U/L (ref 40–150)
ALT SERPL W P-5'-P-CCNC: 16 U/L (ref 0–50)
ANION GAP SERPL CALCULATED.3IONS-SCNC: 11 MMOL/L (ref 7–15)
ANION GAP SERPL CALCULATED.3IONS-SCNC: 6 MMOL/L (ref 7–15)
APPEARANCE UR: ABNORMAL
AST SERPL W P-5'-P-CCNC: 32 U/L (ref 0–45)
BASE EXCESS BLDA CALC-SCNC: 1 MMOL/L (ref -9–1.8)
BASE EXCESS BLDV CALC-SCNC: 1.1 MMOL/L (ref -7.7–1.9)
BASE EXCESS BLDV CALC-SCNC: 2.1 MMOL/L (ref -7.7–1.9)
BASE EXCESS BLDV CALC-SCNC: 2.2 MMOL/L (ref -7.7–1.9)
BILIRUB SERPL-MCNC: 0.2 MG/DL
BILIRUB UR QL STRIP: NEGATIVE
BUN SERPL-MCNC: 21.6 MG/DL (ref 8–23)
BUN SERPL-MCNC: 22.4 MG/DL (ref 8–23)
CALCIUM SERPL-MCNC: 8.5 MG/DL (ref 8.8–10.2)
CALCIUM SERPL-MCNC: 8.8 MG/DL (ref 8.8–10.2)
CHLORIDE SERPL-SCNC: 102 MMOL/L (ref 98–107)
CHLORIDE SERPL-SCNC: 104 MMOL/L (ref 98–107)
COLOR UR AUTO: YELLOW
CREAT SERPL-MCNC: 1.4 MG/DL (ref 0.51–0.95)
CREAT SERPL-MCNC: 2.11 MG/DL (ref 0.51–0.95)
DEPRECATED HCO3 PLAS-SCNC: 26 MMOL/L (ref 22–29)
DEPRECATED HCO3 PLAS-SCNC: 30 MMOL/L (ref 22–29)
EGFRCR SERPLBLD CKD-EPI 2021: 25 ML/MIN/1.73M2
EGFRCR SERPLBLD CKD-EPI 2021: 41 ML/MIN/1.73M2
ERYTHROCYTE [DISTWIDTH] IN BLOOD BY AUTOMATED COUNT: 13.1 % (ref 10–15)
FLUAV RNA SPEC QL NAA+PROBE: NEGATIVE
FLUBV RNA RESP QL NAA+PROBE: NEGATIVE
GLUCOSE BLDC GLUCOMTR-MCNC: 101 MG/DL (ref 70–99)
GLUCOSE BLDC GLUCOMTR-MCNC: 103 MG/DL (ref 70–99)
GLUCOSE BLDC GLUCOMTR-MCNC: 103 MG/DL (ref 70–99)
GLUCOSE BLDC GLUCOMTR-MCNC: 88 MG/DL (ref 70–99)
GLUCOSE BLDC GLUCOMTR-MCNC: 89 MG/DL (ref 70–99)
GLUCOSE SERPL-MCNC: 100 MG/DL (ref 70–99)
GLUCOSE SERPL-MCNC: 99 MG/DL (ref 70–99)
GLUCOSE UR STRIP-MCNC: NEGATIVE MG/DL
HCO3 BLD-SCNC: 24 MMOL/L (ref 21–28)
HCO3 BLDV-SCNC: 27 MMOL/L (ref 21–28)
HCO3 BLDV-SCNC: 27 MMOL/L (ref 21–28)
HCO3 BLDV-SCNC: 30 MMOL/L (ref 21–28)
HCT VFR BLD AUTO: 22.7 % (ref 35–47)
HGB BLD-MCNC: 7.6 G/DL (ref 11.7–15.7)
HGB UR QL STRIP: ABNORMAL
HOLD SPECIMEN: NORMAL
HYALINE CASTS: 2 /LPF
KETONES UR STRIP-MCNC: 20 MG/DL
LACTATE SERPL-SCNC: 1.1 MMOL/L (ref 0.7–2)
LACTATE SERPL-SCNC: 1.5 MMOL/L (ref 0.7–2)
LEUKOCYTE ESTERASE UR QL STRIP: ABNORMAL
MAGNESIUM SERPL-MCNC: 1.6 MG/DL (ref 1.7–2.3)
MCH RBC QN AUTO: 31.4 PG (ref 26.5–33)
MCHC RBC AUTO-ENTMCNC: 33.5 G/DL (ref 31.5–36.5)
MCV RBC AUTO: 94 FL (ref 78–100)
MUCOUS THREADS #/AREA URNS LPF: PRESENT /LPF
NITRATE UR QL: NEGATIVE
O2/TOTAL GAS SETTING VFR VENT: 21 %
O2/TOTAL GAS SETTING VFR VENT: 32 %
PCO2 BLD: 30 MM HG (ref 35–45)
PCO2 BLDV: 44 MM HG (ref 40–50)
PCO2 BLDV: 47 MM HG (ref 40–50)
PCO2 BLDV: 65 MM HG (ref 40–50)
PH BLD: 7.51 [PH] (ref 7.35–7.45)
PH BLDV: 7.27 [PH] (ref 7.32–7.43)
PH BLDV: 7.37 [PH] (ref 7.32–7.43)
PH BLDV: 7.4 [PH] (ref 7.32–7.43)
PH UR STRIP: 5 [PH] (ref 5–7)
PHOSPHATE SERPL-MCNC: 3.7 MG/DL (ref 2.5–4.5)
PLATELET # BLD AUTO: 127 10E3/UL (ref 150–450)
PO2 BLD: 97 MM HG (ref 80–105)
PO2 BLDV: 28 MM HG (ref 25–47)
PO2 BLDV: 28 MM HG (ref 25–47)
PO2 BLDV: 39 MM HG (ref 25–47)
POTASSIUM SERPL-SCNC: 3.2 MMOL/L (ref 3.4–5.3)
POTASSIUM SERPL-SCNC: 3.5 MMOL/L (ref 3.4–5.3)
PROCALCITONIN SERPL IA-MCNC: 0.2 NG/ML
PROT SERPL-MCNC: 6.3 G/DL (ref 6.4–8.3)
RBC # BLD AUTO: 2.42 10E6/UL (ref 3.8–5.2)
RBC URINE: 45 /HPF
RSV RNA SPEC NAA+PROBE: NEGATIVE
SARS-COV-2 RNA RESP QL NAA+PROBE: NEGATIVE
SODIUM SERPL-SCNC: 138 MMOL/L (ref 135–145)
SODIUM SERPL-SCNC: 141 MMOL/L (ref 135–145)
SP GR UR STRIP: 1.02 (ref 1–1.03)
SQUAMOUS EPITHELIAL: 1 /HPF
TSH SERPL DL<=0.005 MIU/L-ACNC: 0.64 UIU/ML (ref 0.3–4.2)
UROBILINOGEN UR STRIP-MCNC: NORMAL MG/DL
WBC # BLD AUTO: 4.3 10E3/UL (ref 4–11)
WBC CLUMPS #/AREA URNS HPF: PRESENT /HPF
WBC URINE: >182 /HPF

## 2023-11-18 PROCEDURE — 87637 SARSCOV2&INF A&B&RSV AMP PRB: CPT | Performed by: NURSE PRACTITIONER

## 2023-11-18 PROCEDURE — 73560 X-RAY EXAM OF KNEE 1 OR 2: CPT | Mod: RT

## 2023-11-18 PROCEDURE — 82803 BLOOD GASES ANY COMBINATION: CPT | Performed by: NURSE PRACTITIONER

## 2023-11-18 PROCEDURE — 83605 ASSAY OF LACTIC ACID: CPT

## 2023-11-18 PROCEDURE — 36415 COLL VENOUS BLD VENIPUNCTURE: CPT | Performed by: INTERNAL MEDICINE

## 2023-11-18 PROCEDURE — 250N000009 HC RX 250: Performed by: NURSE PRACTITIONER

## 2023-11-18 PROCEDURE — 84145 PROCALCITONIN (PCT): CPT | Performed by: NURSE PRACTITIONER

## 2023-11-18 PROCEDURE — 250N000009 HC RX 250

## 2023-11-18 PROCEDURE — 84100 ASSAY OF PHOSPHORUS: CPT | Performed by: NURSE PRACTITIONER

## 2023-11-18 PROCEDURE — 82803 BLOOD GASES ANY COMBINATION: CPT

## 2023-11-18 PROCEDURE — 250N000011 HC RX IP 250 OP 636

## 2023-11-18 PROCEDURE — 83735 ASSAY OF MAGNESIUM: CPT | Performed by: NURSE PRACTITIONER

## 2023-11-18 PROCEDURE — 999N000065 XR KNEE PORT RIGHT 1/2 VIEWS

## 2023-11-18 PROCEDURE — 258N000003 HC RX IP 258 OP 636: Performed by: INTERNAL MEDICINE

## 2023-11-18 PROCEDURE — G1010 CDSM STANSON: HCPCS | Mod: GC | Performed by: RADIOLOGY

## 2023-11-18 PROCEDURE — 99207 PR NO BILLABLE SERVICE THIS VISIT: CPT

## 2023-11-18 PROCEDURE — 87086 URINE CULTURE/COLONY COUNT: CPT | Performed by: NURSE PRACTITIONER

## 2023-11-18 PROCEDURE — 94002 VENT MGMT INPAT INIT DAY: CPT

## 2023-11-18 PROCEDURE — 999N000157 HC STATISTIC RCP TIME EA 10 MIN

## 2023-11-18 PROCEDURE — 81001 URINALYSIS AUTO W/SCOPE: CPT | Performed by: NURSE PRACTITIONER

## 2023-11-18 PROCEDURE — 250N000013 HC RX MED GY IP 250 OP 250 PS 637: Performed by: INTERNAL MEDICINE

## 2023-11-18 PROCEDURE — 36415 COLL VENOUS BLD VENIPUNCTURE: CPT | Performed by: NURSE PRACTITIONER

## 2023-11-18 PROCEDURE — 999N000127 HC STATISTIC PERIPHERAL IV START W US GUIDANCE

## 2023-11-18 PROCEDURE — 80053 COMPREHEN METABOLIC PANEL: CPT | Performed by: NURSE PRACTITIONER

## 2023-11-18 PROCEDURE — 36600 WITHDRAWAL OF ARTERIAL BLOOD: CPT

## 2023-11-18 PROCEDURE — 99292 CRITICAL CARE ADDL 30 MIN: CPT | Mod: FS

## 2023-11-18 PROCEDURE — 83605 ASSAY OF LACTIC ACID: CPT | Performed by: NURSE PRACTITIONER

## 2023-11-18 PROCEDURE — 258N000003 HC RX IP 258 OP 636: Performed by: NURSE ANESTHETIST, CERTIFIED REGISTERED

## 2023-11-18 PROCEDURE — 87040 BLOOD CULTURE FOR BACTERIA: CPT | Performed by: INTERNAL MEDICINE

## 2023-11-18 PROCEDURE — 99291 CRITICAL CARE FIRST HOUR: CPT | Performed by: NURSE PRACTITIONER

## 2023-11-18 PROCEDURE — 999N000065 XR CHEST PORT 1 VIEW

## 2023-11-18 PROCEDURE — 85027 COMPLETE CBC AUTOMATED: CPT | Performed by: NURSE PRACTITIONER

## 2023-11-18 PROCEDURE — 36415 COLL VENOUS BLD VENIPUNCTURE: CPT

## 2023-11-18 PROCEDURE — 258N000003 HC RX IP 258 OP 636

## 2023-11-18 PROCEDURE — 250N000011 HC RX IP 250 OP 636: Mod: JZ | Performed by: NURSE ANESTHETIST, CERTIFIED REGISTERED

## 2023-11-18 PROCEDURE — 999N000065 XR ABDOMEN PORT 1 VIEW

## 2023-11-18 PROCEDURE — 82310 ASSAY OF CALCIUM: CPT

## 2023-11-18 PROCEDURE — 250N000013 HC RX MED GY IP 250 OP 250 PS 637: Performed by: PEDIATRICS

## 2023-11-18 PROCEDURE — 5A1945Z RESPIRATORY VENTILATION, 24-96 CONSECUTIVE HOURS: ICD-10-PCS | Performed by: INTERNAL MEDICINE

## 2023-11-18 PROCEDURE — 258N000003 HC RX IP 258 OP 636: Performed by: NURSE PRACTITIONER

## 2023-11-18 PROCEDURE — 250N000011 HC RX IP 250 OP 636: Mod: JZ

## 2023-11-18 PROCEDURE — 250N000013 HC RX MED GY IP 250 OP 250 PS 637: Performed by: NURSE PRACTITIONER

## 2023-11-18 PROCEDURE — 71045 X-RAY EXAM CHEST 1 VIEW: CPT | Mod: 26 | Performed by: STUDENT IN AN ORGANIZED HEALTH CARE EDUCATION/TRAINING PROGRAM

## 2023-11-18 PROCEDURE — 999N000285 HC STATISTIC VASC ACCESS LAB DRAW WITH PIV START

## 2023-11-18 PROCEDURE — 70450 CT HEAD/BRAIN W/O DYE: CPT | Mod: 26 | Performed by: RADIOLOGY

## 2023-11-18 PROCEDURE — 200N000002 HC R&B ICU UMMC

## 2023-11-18 PROCEDURE — 370N000003 HC ANESTHESIA WARD SERVICE: Performed by: NURSE ANESTHETIST, CERTIFIED REGISTERED

## 2023-11-18 PROCEDURE — 84443 ASSAY THYROID STIM HORMONE: CPT

## 2023-11-18 PROCEDURE — 250N000013 HC RX MED GY IP 250 OP 250 PS 637

## 2023-11-18 PROCEDURE — 74018 RADEX ABDOMEN 1 VIEW: CPT | Mod: 26 | Performed by: STUDENT IN AN ORGANIZED HEALTH CARE EDUCATION/TRAINING PROGRAM

## 2023-11-18 PROCEDURE — 70450 CT HEAD/BRAIN W/O DYE: CPT | Mod: MG

## 2023-11-18 RX ORDER — NALOXONE HYDROCHLORIDE 0.4 MG/ML
0.4 INJECTION, SOLUTION INTRAMUSCULAR; INTRAVENOUS; SUBCUTANEOUS
Status: DISCONTINUED | OUTPATIENT
Start: 2023-11-18 | End: 2023-11-30 | Stop reason: HOSPADM

## 2023-11-18 RX ORDER — NICOTINE POLACRILEX 4 MG
15-30 LOZENGE BUCCAL
Status: DISCONTINUED | OUTPATIENT
Start: 2023-11-18 | End: 2023-11-30 | Stop reason: HOSPADM

## 2023-11-18 RX ORDER — DEXTROSE MONOHYDRATE 25 G/50ML
25-50 INJECTION, SOLUTION INTRAVENOUS
Status: DISCONTINUED | OUTPATIENT
Start: 2023-11-18 | End: 2023-11-30 | Stop reason: HOSPADM

## 2023-11-18 RX ORDER — SODIUM CHLORIDE 9 MG/ML
INJECTION, SOLUTION INTRAVENOUS
Status: DISCONTINUED
Start: 2023-11-18 | End: 2023-11-18 | Stop reason: HOSPADM

## 2023-11-18 RX ORDER — NALOXONE HYDROCHLORIDE 0.4 MG/ML
INJECTION, SOLUTION INTRAMUSCULAR; INTRAVENOUS; SUBCUTANEOUS
Status: COMPLETED
Start: 2023-11-18 | End: 2023-11-18

## 2023-11-18 RX ORDER — PROPOFOL 10 MG/ML
INJECTION, EMULSION INTRAVENOUS
Status: COMPLETED
Start: 2023-11-18 | End: 2023-11-18

## 2023-11-18 RX ORDER — NALOXONE HYDROCHLORIDE 0.4 MG/ML
0.2 INJECTION, SOLUTION INTRAMUSCULAR; INTRAVENOUS; SUBCUTANEOUS
Status: DISCONTINUED | OUTPATIENT
Start: 2023-11-18 | End: 2023-11-30 | Stop reason: HOSPADM

## 2023-11-18 RX ORDER — CHLORHEXIDINE GLUCONATE ORAL RINSE 1.2 MG/ML
15 SOLUTION DENTAL EVERY 12 HOURS
Status: DISCONTINUED | OUTPATIENT
Start: 2023-11-18 | End: 2023-11-24

## 2023-11-18 RX ORDER — ALBUTEROL SULFATE 0.83 MG/ML
2.5 SOLUTION RESPIRATORY (INHALATION)
Status: DISCONTINUED | OUTPATIENT
Start: 2023-11-18 | End: 2023-11-30 | Stop reason: HOSPADM

## 2023-11-18 RX ORDER — FENTANYL CITRATE 50 UG/ML
25-50 INJECTION, SOLUTION INTRAMUSCULAR; INTRAVENOUS
Status: DISCONTINUED | OUTPATIENT
Start: 2023-11-18 | End: 2023-11-21

## 2023-11-18 RX ORDER — PROPOFOL 10 MG/ML
INJECTION, EMULSION INTRAVENOUS
Status: COMPLETED | OUTPATIENT
Start: 2023-11-18 | End: 2023-11-18

## 2023-11-18 RX ORDER — BACLOFEN 10 MG/1
20 TABLET ORAL DAILY
Status: DISCONTINUED | OUTPATIENT
Start: 2023-11-18 | End: 2023-11-19

## 2023-11-18 RX ORDER — POTASSIUM CHLORIDE 20MEQ/15ML
40 LIQUID (ML) ORAL ONCE
Status: COMPLETED | OUTPATIENT
Start: 2023-11-18 | End: 2023-11-18

## 2023-11-18 RX ORDER — ROPIVACAINE IN 0.9% SOD CHL/PF 0.1 %
.03-.125 PLASTIC BAG, INJECTION (ML) EPIDURAL CONTINUOUS
Status: DISCONTINUED | OUTPATIENT
Start: 2023-11-18 | End: 2023-11-20

## 2023-11-18 RX ORDER — PROPOFOL 10 MG/ML
5-75 INJECTION, EMULSION INTRAVENOUS CONTINUOUS
Status: DISCONTINUED | OUTPATIENT
Start: 2023-11-18 | End: 2023-11-19

## 2023-11-18 RX ORDER — CEFTRIAXONE 1 G/1
1 INJECTION, POWDER, FOR SOLUTION INTRAMUSCULAR; INTRAVENOUS EVERY 24 HOURS
Status: COMPLETED | OUTPATIENT
Start: 2023-11-18 | End: 2023-11-24

## 2023-11-18 RX ADMIN — HEPARIN SODIUM 5000 UNITS: 5000 INJECTION, SOLUTION INTRAVENOUS; SUBCUTANEOUS at 15:15

## 2023-11-18 RX ADMIN — LATANOPROST 1 DROP: 50 SOLUTION OPHTHALMIC at 14:36

## 2023-11-18 RX ADMIN — PHENYLEPHRINE HYDROCHLORIDE 100 MCG: 10 INJECTION INTRAVENOUS at 05:50

## 2023-11-18 RX ADMIN — LEVOTHYROXINE SODIUM 100 MCG: 50 TABLET ORAL at 12:39

## 2023-11-18 RX ADMIN — MULTIPLE VITAMINS W/ MINERALS TAB 1 TABLET: TAB at 20:02

## 2023-11-18 RX ADMIN — ACETAMINOPHEN 1000 MG: 325 TABLET, FILM COATED ORAL at 16:56

## 2023-11-18 RX ADMIN — NITROFURANTOIN MONOHYDRATE/MACROCRYSTALS 100 MG: 25; 75 CAPSULE ORAL at 15:58

## 2023-11-18 RX ADMIN — SODIUM CHLORIDE, POTASSIUM CHLORIDE, SODIUM LACTATE AND CALCIUM CHLORIDE 500 ML: 600; 310; 30; 20 INJECTION, SOLUTION INTRAVENOUS at 15:14

## 2023-11-18 RX ADMIN — POTASSIUM CHLORIDE 40 MEQ: 40 SOLUTION ORAL at 20:02

## 2023-11-18 RX ADMIN — BACLOFEN 30 MG: 20 TABLET ORAL at 21:46

## 2023-11-18 RX ADMIN — ACYCLOVIR 400 MG: 400 TABLET ORAL at 14:35

## 2023-11-18 RX ADMIN — Medication 40 MG: at 12:39

## 2023-11-18 RX ADMIN — NALOXONE HYDROCHLORIDE 0.4 MG: 0.4 INJECTION, SOLUTION INTRAMUSCULAR; INTRAVENOUS; SUBCUTANEOUS at 05:14

## 2023-11-18 RX ADMIN — SODIUM CHLORIDE 0.2 MCG/KG/HR: 9 INJECTION, SOLUTION INTRAVENOUS at 12:28

## 2023-11-18 RX ADMIN — PROPOFOL 10 MCG/KG/MIN: 10 INJECTION, EMULSION INTRAVENOUS at 06:00

## 2023-11-18 RX ADMIN — FAMOTIDINE 40 MG: 20 TABLET ORAL at 21:46

## 2023-11-18 RX ADMIN — CHLORHEXIDINE GLUCONATE 15 ML: 1.2 SOLUTION ORAL at 12:38

## 2023-11-18 RX ADMIN — SODIUM CHLORIDE: 9 INJECTION, SOLUTION INTRAVENOUS at 04:44

## 2023-11-18 RX ADMIN — BACLOFEN 20 MG: 10 TABLET ORAL at 12:39

## 2023-11-18 RX ADMIN — HEPARIN SODIUM 5000 UNITS: 5000 INJECTION, SOLUTION INTRAVENOUS; SUBCUTANEOUS at 21:43

## 2023-11-18 RX ADMIN — PROPOFOL 130 MG: 10 INJECTION, EMULSION INTRAVENOUS at 05:48

## 2023-11-18 RX ADMIN — SODIUM CHLORIDE, POTASSIUM CHLORIDE, SODIUM LACTATE AND CALCIUM CHLORIDE 500 ML: 600; 310; 30; 20 INJECTION, SOLUTION INTRAVENOUS at 23:33

## 2023-11-18 RX ADMIN — SODIUM CHLORIDE, POTASSIUM CHLORIDE, SODIUM LACTATE AND CALCIUM CHLORIDE 500 ML: 600; 310; 30; 20 INJECTION, SOLUTION INTRAVENOUS at 18:48

## 2023-11-18 RX ADMIN — CEFTRIAXONE SODIUM 1 G: 1 INJECTION, POWDER, FOR SOLUTION INTRAMUSCULAR; INTRAVENOUS at 19:58

## 2023-11-18 RX ADMIN — NOREPINEPHRINE BITARTRATE 0.03 MCG/KG/MIN: 0.02 INJECTION, SOLUTION INTRAVENOUS at 06:01

## 2023-11-18 RX ADMIN — Medication 10 MG: at 07:14

## 2023-11-18 RX ADMIN — CHLORHEXIDINE GLUCONATE 15 ML: 1.2 SOLUTION ORAL at 20:12

## 2023-11-18 RX ADMIN — ACYCLOVIR 400 MG: 400 TABLET ORAL at 20:02

## 2023-11-18 ASSESSMENT — ACTIVITIES OF DAILY LIVING (ADL)
ADLS_ACUITY_SCORE: 45

## 2023-11-18 NOTE — PROGRESS NOTES
Orthopaedic Surgery Progress Note 11/18/2023    E: intubated in ICU overnight.     S: intubated/sedated     O:  Temp: (!) 96.3  F (35.7  C) Temp src: Axillary BP: 130/60 Pulse: 90   Resp: 22 SpO2: 100 % O2 Device: Mechanical Ventilator Oxygen Delivery: 13 LPM    Exam:  Gen: intubated/sedated   Resp: ventilated     MSK:  RLE  No bruising or skin breakdown.  Foot WWP    LLE  Cast in place, well fitting. No skin breakdown around edges.   Toes WWP        Recent Labs   Lab 11/18/23  0532 11/17/23  0339   WBC 4.3 5.8   HGB 7.6* 8.3*   * 171       X-rays: post cast xrays pending     Assessment: Marylee Woods is a 68 year old female who sustained a fall from her wheelchair on 11/16 and was found to have a right distal femur fracture and minimally displaced left medial malleolus fracture. Plan to treat both injuries non operatively, right leg in a long leg cast and left ankle in CAM boot. While intubated/sedated no need for CAM boot on left leg due to concern for skin breakdown. Please check for skin breakdown around edges of cast and contact orthopedic surgery for any concerns.       Plan:  Medicine Primary  Activity: NWB BLE  Weight bearing status: NWB BLE  Antibiotics: none  Diet: per primary   DVT prophylaxis: per primary   Bracing/Splinting: right leg long leg cast to be kept clean dry and intact. Left leg cam boot once no longer intubated/sedated   X-rays: post casting films right leg pending   Physical Therapy: ADL's  Occupational Therapy: ADL's  Follow-up: TBD    Disposition: TBD      Jude Moraes MD 11/18/2023  Orthopaedic Surgery Resident, PGY4

## 2023-11-18 NOTE — PROGRESS NOTES
"[id number]    VS: Blood pressure 112/47, pulse 61, temperature 97.2  F (36.2  C), temperature source Axillary, resp. rate 16, height 1.727 m (5' 8\"), weight 54.6 kg (120 lb 5.9 oz), SpO2 98%, not currently breastfeeding.   O2: Pt. on 3L oxygen via NC   Output: Incontinent of bladder   Last BM: None   Activity: Not oob, A2   Skin: Visible skin intact   Pain: No facial expression of pain observed   Neuro: Arouse with touch   Dressing: Cast on right foot and sling on left foot   Diet: Regular diet,    LDA: Left PIV, infusing   Equipment: Personal items,    Plan: Continue POC.   Additional Info:      "

## 2023-11-18 NOTE — H&P
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 Overnight a rapid response was called as patient was found to be obtunded and transferred to the ICU.      CHANGES and MAJOR THINGS TODAY:   -Head CT   -Check TSH with free T4   -Pending UA, Sputum   -Reduce/hold sedating medications   -Neuro assessments   -Wean ventilator as able   -Switch propofol to precedex     PLAN:    Neuro:  # Pain and sedation  - RASS goal 0 to -1  - Switch propofol gtt to precedex (if no improvement in neurological function keep precedex off as well)   - Oxycodone held in the setting of being recently obtunded   - PRN Fentanyl boluses available analgesia while intubated     # Major Depressive Disorder   - Hold pta Cymbalta in the setting of DENNISE with AMS     # Altered Mental Status    Rapid response was called overnight as patient was found to be obtunded and non-arousable. Attempts to arousing patient including Narcan were unsuccessful and was later transferred here. With concerns for protecting her airway, patient was intubated. Ruling out infectious process vs. Metabolic vs. Drug induced. Concern patient has DENNISE, and sedating medications contributing to being obtunded. Less likely this is infection, CXR not concerning for pneumonia, WBC, lactate, and procalcitonin normal. Patient also did not arrive with any infectious symptoms and currently afebrile and hemodynamically stable. Head CT without acute pathology.   -  Hold pta Nuvigil for sleep, to reduce AMS   - Reduce sedating medications; hold oxycodone as above  - Hold pta Cymbalta as above for AMS   - Neuro assessments per unit protocol   - Head CT without acute pathology   - CBC, BMP daily   - Obtain UA and Sputum sample   - Ensure delirium prevention       # Progressive Multiple Sclerosis (wheelchair bound)   Patient has longstanding diagnosis of progressive MS now wheelchair bound. Appears she sees Immunology and Neurology outpatient basis. Last brain MRI in 2017 showing suspicion of demyelinating disease. Unsure of exact biologics patient has was on, appears she has received Lemtrada (2017, 2019), and other past DMTs included Betaseron, Aubagio.   - Continue pta baclofen 20 mg daily, and 30 mg at bedtime to reduce withdrawal symptoms   - Hold pta oxybutynin for neurogenic bladder 2/2 MS       Pulmonary:  # Sleep apnea 2/2 MS   Appears patient follows with sleep clinic/doctor and found to have sleep apnea 2/2 her MS   -Wears a CPAP at home     # Tobacco use; current smoker?   - Hx of tobacco use, and appears she is current     # Chronic hypercarbia?   Patient was found to be obtunded overnight, unable to arouse despite Narcan d/t concerns r/t pain medication use from femur fracture, however this was unsuccessful. Patient was then transferred to ICU and do to concerns of protecting her airway she was intubated. Additionally, patient has longstanding tobacco use, and not able to see recent PFTs, but concerns she could be chronic co2 retainer at baseline and should consider outpatient testing for COPD. CXR obtained post intubation does not appear to show infectious processes.   - Vent settings: VC   -Fio2 21%    -TV: 400   -PEEP 5    -RR 12   -Repeat VBG   -Pulmonary toilet   -Oral hygiene with chlorhexidine  -Obtain sputum sample if able    -Wean vent as able; trial SBT as appropriate     # History of pulmonary nodules   Recent CT Chest from 6/16/23 shows right lower lobe  nodule has resolved with a stable left lower lobe nodule.   - Now on yearly chest CT follow-ups     Cardiovascular:  # Hypertension   - Hold pta hydrochlorothiazide in the setting of hemodynamic changes   - Norepinephrine for MAP goal 65    GI/Nutrition:  # GERD   - pantoprazole 40 mg suspension daily   - Feeding tube okay to use       Renal/Fluids/Electrolytes:  # DENNISE   # CKD   # Reccurent UTI   Patients baseline creatinine around 0.96-1.08. Currently 2.11. Patient also has history of CKD and GFR around 54 (varies). Could be possible concern for intrarenal DENNISE 2/2 medications, or infectious process. DENNISE with CKD concerns for any sedating related medications could have caused patient to become obtunded.   - Reduce nephrotoxic agents   - Verduzco placement   - Monitor I/Os   - Obtain UA   - Daily BMP     Endocrine:  # Graves s/p thyroid ablation   Not inclined that acute AMS/Obtunded is related to metabolic issues.   - Check TSH with free T4   - Continue pta synthroid 100 mcg daily   - Hypoglycemia protocol     ID:  # Herpes simplex virus; prophylaxis on Acyclovir   - Continue pta Acyclovir 400 mg table BID     # Reccurent UTIs   AMS could be related to reccurrent UTI but unlikely given the acute course of becoming obtunded. WBC, lactate and procal within normal limits and patient hemodynamically stable so not pointing towards infection.   - Continue pta macrobid 100 mg daily for recurrent UTIs   - Pending UA      Hematology/Oncology:    # Anemia of chronic disease 2/2 CKD   - Hold pta ferrous sulfate in the setting of DENNISE   - Daily CBC     Musculoskeletal:  # Two outpatient mechanical falls   # Right distal femur fracture and left malleolus fracture  # Osteoporosis   - Orthopedics following    -No plans for surgery (has significant amount of hardware from prior orthopedic injuries)    -NWB to BLE   - Head CT to rule out acute pathology; negative   - Monitor pulses     Skin:  No acute issues       General  Cares/Prophylaxis:    DVT Prophylaxis: Pneumatic Compression Devices, SubQ Heparin   GI Prophylaxis: PPI  Restraints: Soft wrist restraints    Family Communication: Manuel () updated this morning   Code Status: Full     Lines/tubes/drains:  Padilla Verduzco     Disposition:  - Medical ICU [facility]    Patient seen and findings/plan discussed with medical ICU staff, Dr. Jacobs.    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 Overnight a rapid response was called as patient was found to be obtunded and transferred to the ICU. Patient was unable to protect her airway and therefore intubated. Concerns for AMS are related to drug accumulation in the setting of her DENNISE on CKD. Infectious workup including WBC, Lactate, Procal all within normal limits and CXR without evidence of infectious process. CT head was ruled out for any possible acute pathology which was also negative.       REVIEW OF SYSTEMS: Limited d/t intubation    PAST MEDICAL HISTORY:   Past Medical History:   Diagnosis Date    Atypical ductal hyperplasia of left breast 02/2022    Depression     Gastro-oesophageal reflux disease     Graves disease     Multiple sclerosis (H)     Osteoporosis     Postablative hypothyroidism      SURGICAL HISTORY:  Past Surgical History:   Procedure Laterality Date    C/SECTION, LOW TRANSVERSE  1992    COLONOSCOPY  2007    COLONOSCOPY N/A 1/26/2017    Procedure: COMBINED COLONOSCOPY, SINGLE OR MULTIPLE BIOPSY/POLYPECTOMY BY BIOPSY;  Surgeon: Mayo Adkins  MD RSOAMARIA, MD;  Location:  GI    ESOPHAGOSCOPY, GASTROSCOPY, DUODENOSCOPY (EGD), COMBINED  2017    Dr. Adkins [facility]    ESOPHAGOSCOPY, GASTROSCOPY, DUODENOSCOPY (EGD), COMBINED N/A 2017    Procedure: COMBINED ESOPHAGOSCOPY, GASTROSCOPY, DUODENOSCOPY (EGD), BIOPSY SINGLE OR MULTIPLE;  Surgeon: Mayo Adkins MD, MD;  Location:  GI    ESOPHAGOSCOPY, GASTROSCOPY, DUODENOSCOPY (EGD), COMBINED N/A 2023    Procedure: ESOPHAGOGASTRODUODENOSCOPY, WITH BIOPSY;  Surgeon: Cristina Zuniga MD;  Location: [facility] GI    HIP SURGERY      femur ortho surgery    LAPAROSCOPIC CHOLECYSTECTOMY  2014    Procedure: LAPAROSCOPIC CHOLECYSTECTOMY;  Surgeon: Randy Bailey MD;  Location:  [facility]    LUMPECTOMY BREAST Left 3/31/2022    Procedure: LEFT Radiofrequency Identification Seed-Localized Lumpectomy;  Surgeon: Amanda Liu MD;  Location: [facility] OR    OPEN REDUCTION INTERNAL FIXATION FEMUR DISTAL Left 2018    Procedure: OPEN REDUCTION INTERNAL FIXATION LEFT FEMUR DISTAL;  Surgeon: Jose Valadez MD;  Location: [facility]    OPEN REDUCTION INTERNAL FIXATION RODDING INTRAMEDULLARY TIBIA  2013    Procedure: OPEN REDUCTION INTERNAL FIXATION RODDING INTRAMEDULLARY TIBIA;;  Surgeon: Sajan Cast MD;  Location: [facility] OR    ORTHOPEDIC SURGERY  2009    surgery right upper femur fx near hip     SOCIAL HISTORY:  Social History     Socioeconomic History    Marital status:      Spouse name: None    Number of children: None    Years of education: None    Highest education level: None   Tobacco Use    Smoking status: Every Day     Packs/day: .25     Types: Cigarettes     Last attempt to quit: 2022     Years since quittin.8    Smokeless tobacco: Never   Vaping Use    Vaping Use: Never used   Substance and Sexual Activity    Alcohol use: Yes     Alcohol/week: 0.0 standard drinks of alcohol     Comment: occasional     Drug use: No    Sexual activity: Yes     Partners: Male     FAMILY  HISTORY:   Family History   Problem Relation Age of Onset    Heart Disease Maternal Grandmother     Cancer Maternal Grandfather     Heart Disease Paternal Grandfather     Heart Disease Mother     Heart Disease Father     Diabetes No family hx of     Coronary Artery Disease No family hx of     Hypertension No family hx of     Hyperlipidemia No family hx of     Cerebrovascular Disease No family hx of     Breast Cancer No family hx of     Colon Cancer No family hx of     Prostate Cancer No family hx of     Other Cancer No family hx of     Depression No family hx of     Anxiety Disorder No family hx of     Mental Illness No family hx of     Substance Abuse No family hx of     Anesthesia Reaction No family hx of     Asthma No family hx of     Osteoporosis No family hx of     Genetic Disorder No family hx of     Thyroid Disease No family hx of     Obesity No family hx of     Unknown/Adopted No family hx of      ALLERGIES:   Allergies   Allergen Reactions    Amantadine      hallucinations    Budesonide     Budesonide-Formoterol Fumarate Rash     MEDICATIONS:  No current facility-administered medications on file prior to encounter.  acetaminophen (TYLENOL) 500 MG tablet, Take 1-2 tablets (500-1,000 mg) by mouth every 8 hours as needed for mild pain or fever (greater than 101 degrees)  acyclovir (ZOVIRAX) 400 MG tablet, Take 1 tablet (400 mg) by mouth every 12 hours  Armodafinil 200 MG TABS, Take 1 tablet by mouth daily at 2 pm  baclofen (LIORESAL) 10 MG tablet, 2 times daily Take 2 tablets (20 MG) by mouth every morning, 1 tablet (10 MG) by mouth daily in the afternoon as needed, and 3 tablets (30 MG) by mouth every evening before bedtime.  calcium carbonate 500 mg, elemental, (OSCAL 500) 1250 (500 Ca) MG TABS tablet, Take 1 tablet by mouth daily  DULoxetine (CYMBALTA) 20 MG capsule, Take 1 capsule (20 mg) by mouth daily Take along with 60 mg for a total of 80 mg  DULoxetine (CYMBALTA) 60 MG capsule, TAKE 1 CAPSULE BY MOUTH  EVERY DAY  famotidine (PEPCID) 40 MG tablet, Take 1 tablet (40 mg) by mouth At Bedtime  ferrous sulfate (FEROSUL) 325 (65 Fe) MG tablet, Take 325 mg by mouth daily (with breakfast)  gabapentin (NEURONTIN) 300 MG capsule, Take 300 mg by mouth 2 times daily Take 2 tablets (600 MG) by mouth 3-4 times daily.  guaiFENesin-codeine (ROBITUSSIN AC) 100-10 MG/5ML solution, Take 5-10 mLs by mouth nightly as needed for cough (Patient not taking: Reported on 6/22/2023)  hydrochlorothiazide (HYDRODIURIL) 12.5 MG tablet, Take 1 tablet (12.5 mg) by mouth every morning  latanoprost (XALATAN) 0.005 % ophthalmic solution, INSTILL 1 DROP INTO BOTH EYES EVERY DAY AS DIRECTED PT WILL NEED TO BE SEEN FOR FUTURE REFILLS  levothyroxine (SYNTHROID/LEVOTHROID) 100 MCG tablet, TAKE 1 TABLET BY MOUTH 6 TIMES A WEEK  multivitamin w/minerals (THERA-VIT-M) tablet, Take 1 tablet by mouth every evening  nitroFURantoin macrocrystal-monohydrate (MACROBID) 100 MG capsule, Take 1 capsule (100 mg) by mouth daily  oxybutynin (DITROPAN-XL) 10 MG 24 hr tablet, Take 1 tablet (10 mg) by mouth 2 times daily        PHYSICAL EXAMINATION:  Temp:  [96.3  F (35.7  C)-97.2  F (36.2  C)] 96.3  F (35.7  C)  Pulse:  [] 90  Resp:  [11-31] 22  BP: ()/() 130/60  FiO2 (%):  [21 %] 21 %  SpO2:  [93 %-100 %] 100 %  General: Withdrawals upper extremities to stimulation, otherwise sedated   HEENT: atraumatic, normocephalic, PERRL  Neuro: Sedated, withdrawal upper extremities to stimulation, pupils round, sluggish reactions  Pulm/Resp: Clear breath sounds bilaterally without rhonchi, crackles or wheeze, breathing synchronous with the ventilator   CV: RRR, S3 or S4 heart sound, no palpitation, murmurs or rub auscultated   Abdomen: Soft, non-distended, non-tender  : cazares catheter in place, urine yellow and clear  Incisions/Skin: Intact, without erythema  MSK: <3 seconds capillary refill bilaterally, right long lower extremity cast, left knee brace placed      LABS: Reviewed.   Arterial Blood Gases   Recent Labs   Lab 11/18/23  0831   PH 7.51*   PCO2 30*   PO2 97   HCO3 24     Complete Blood Count   Recent Labs   Lab 11/18/23  0532 11/17/23  0339   WBC 4.3 5.8   HGB 7.6* 8.3*   * 171     Basic Metabolic Panel  Recent Labs   Lab 11/18/23  0543 11/18/23  0532 11/18/23  0530 11/17/23  1130 11/17/23  0339   NA  --  138  --   --  135   POTASSIUM  --  3.5  --   --  3.5   CHLORIDE  --  102  --   --  97*   CO2  --  30*  --   --  31*   BUN  --  22.4  --   --  18.4   CR  --  2.11*  --   --  1.74*   GLC 88 99 103* 89 125*     Liver Function Tests  Recent Labs   Lab 11/18/23  0532 11/17/23  1215   AST 32  --    ALT 16  --    ALKPHOS 61  --    BILITOTAL 0.2  --    ALBUMIN 3.4*  --    INR  --  1.26*     Coagulation Profile  Recent Labs   Lab 11/17/23  1215   INR 1.26*   PTT 30       IMAGING:  Recent Results (from the past 24 hour(s))   CT Knee Right w/o Contrast    Narrative    CT KNEE RIGHT WITHOUT CONTRAST 11/17/2023 11:27 AM    CLINICAL HISTORY: distal femur fracture, rule out Hoffa's fragment.  Right knee pain.    TECHNIQUE: CT scan of the abdomen and pelvis was performed without IV  contrast. Multiplanar reformats were obtained. Dose reduction  techniques were used.  CONTRAST: None.    COMPARISON: 11/17/2023 radiographs.    FINDINGS:   Bones: There is an acute comminuted moderately displaced fracture of  the distal femoral metaphysis without significant angulation at the  fracture site. There are a few anteriorly displaced cortical fracture  fragments, as seen on series 2 image 23 and series 4 image 135.  Portions of this fracture extended intra-articularly at the level of  the trochlear groove as seen on series 2 image 40 and the anterior  portion of the intercondylar notch. Osteopenia. No evidence of  additional fractures.    Soft tissues: There is some more defined soft tissue stranding around  the femoral fracture site. There is moderate global atrophy of  the  visualized knee and proximal calf musculature. Small knee joint  effusion. Evidence of intra-articular fracture fragments.      Impression    IMPRESSION:   1.  Acute comminuted mildly displaced fracture of the distal femoral  metaphysis with intra-articular extension.    JOCELYNN LYON MD         SYSTEM ID:  [id number]   XR Chest Port 1 View    Impression    RESIDENT PRELIMINARY INTERPRETATION  Impression: Endotracheal tube tip projects over the high/mid thoracic  trachea. The chest is relatively clear.   XR Abdomen Port 1 View    Impression    RESIDENT PRELIMINARY INTERPRETATION  IMPRESSION: Enteric tube tip and sidehole project over the stomach.      CT HEAD W/O CONTRAST 11/18/2023 9:23 AM     Provided History: AMS w/hx of recent trauma, rule out ICH     Comparison: MRI brain on 6 1/17, CT head 11/1/2017.     Technique: Using multidetector thin collimation helical acquisition  technique, axial, coronal and sagittal CT images from the skull base  to the vertex were obtained without intravenous contrast.      Findings:    No intracranial hemorrhage. No mass effect. No midline shift. No  extra-axial fluid collection. The gray to white matter differentiation  of the cerebral hemispheres is preserved. Ventricles are proportionate  to the sulci. No sulcal effacement. The basal cisterns are patent.  Moderate diffuse cerebral volume loss. Periventricular white matter  hypodensities which are nonspecific, but likely represent chronic  small vessel ischemic disease.     The visualized paranasal sinuses are clear. Left mastoid air cells are  clear. Chronic opacification of the right mastoid air cells.. Orbits  appear unremarkable. No acute fracture.                                                                      Impression:   1. No acute intracranial pathology.  2. Chronic changes including leukoaraiosis, diffuse cerebral volume  loss, right mastoid air cell opacification.

## 2023-11-18 NOTE — PLAN OF CARE
Admitted/transferred from: 8MS  Reason for admission/transfer: Rapid response called d/t pt being unresponsive  Result of skin assessment: Brace to L leg, cast to R leg  Height, weight, drug calc weight: Done  Patient belongings: two rings to bilateral hands    Pt intubated at 0549, 6.5mm at 21 at lip. OG placed, 60cm at lip. Xray obtained for ETT and OG, pending radiology read. Covid swab sent. Verduzco placement attempted. CT scan to be done once pt stable.  ?

## 2023-11-18 NOTE — PHARMACY-ADMISSION MEDICATION HISTORY
Pharmacist Admission Medication History    Admission medication history is complete. The information provided in this note is only as accurate as the sources available at the time of the update.    Information Source(s): Clinic records, [facility]    Pertinent Information:   Patient was too altered interview and  didn't know medications. Reviewed PTA medications with fill history and previous notes. Most prescriptions had recent fills per Surescripts.  Unable to confirm last doses, OTC medications, or frequency of PRN medications.   Levothyroxine:   Patient takes 6 days per week. Unable to confirm which day the off day.  Armodafinil:   Patient prescribed 200 mg daily. Unable to confirm whether patient takes at 2 pm or not.   Last filled 10/22 for #90 tablets  Of note, baclofen gets refilled monthly, likely is often taking 6 tablets per day.    Changes made to PTA medication list:  Added: None  Deleted: guaifenesin-codeine (was listed as not taking in June and received 7 day supply 4/26/23)  Changed:   Gabapentin: Take 300 mg BID; 600 mg 3-4 times daily --> 600 mg 4 time daily (per Surescripts)  Baclofen: 2 times daily --> 10-30 mg 2 times daily (in addition to written instructions)    Medication Affordability:  Not including over the counter (OTC) medications, was there a time in the past 3 months when you did not take your medications as prescribed because of cost?: Unable to Assess    Allergies reviewed with patient and updates made in EHR: unable to assess    Medication History Completed By: Delores Luna [facility] 11/18/2023 3:55 PM    PTA Med List   Medication Sig Last Dose    acetaminophen (TYLENOL) 500 MG tablet Take 1-2 tablets (500-1,000 mg) by mouth every 8 hours as needed for mild pain or fever (greater than 101 degrees)     acyclovir (ZOVIRAX) 400 MG tablet Take 1 tablet (400 mg) by mouth every 12 hours     Armodafinil 200 MG TABS Take 200 mg by mouth  daily at 2 pm     baclofen (LIORESAL) 10 MG tablet Take 10-30 mg by mouth 2 times daily Take 2 tablets (20 MG) by mouth every morning, 1 tablet (10 MG) by mouth daily in the afternoon as needed, and 3 tablets (30 MG) by mouth every evening before bedtime.     calcium carbonate 500 mg, elemental, (OSCAL 500) 1250 (500 Ca) MG TABS tablet Take 1 tablet by mouth daily     DULoxetine (CYMBALTA) 20 MG capsule Take 1 capsule (20 mg) by mouth daily Take along with 60 mg for a total of 80 mg     DULoxetine (CYMBALTA) 60 MG capsule TAKE 1 CAPSULE BY MOUTH EVERY DAY     famotidine (PEPCID) 40 MG tablet Take 1 tablet (40 mg) by mouth At Bedtime     ferrous sulfate (FEROSUL) 325 (65 Fe) MG tablet Take 325 mg by mouth daily (with breakfast)     gabapentin (NEURONTIN) 300 MG capsule Take 600 mg by mouth 4 times daily     hydrochlorothiazide (HYDRODIURIL) 12.5 MG tablet Take 1 tablet (12.5 mg) by mouth every morning     latanoprost (XALATAN) 0.005 % ophthalmic solution INSTILL 1 DROP INTO BOTH EYES EVERY DAY AS DIRECTED PT WILL NEED TO BE SEEN FOR FUTURE REFILLS     levothyroxine (SYNTHROID/LEVOTHROID) 100 MCG tablet TAKE 1 TABLET BY MOUTH 6 TIMES A WEEK     multivitamin w/minerals (THERA-VIT-M) tablet Take 1 tablet by mouth every evening     nitroFURantoin macrocrystal-monohydrate (MACROBID) 100 MG capsule Take 1 capsule (100 mg) by mouth daily     oxybutynin (DITROPAN-XL) 10 MG 24 hr tablet Take 1 tablet (10 mg) by mouth 2 times daily

## 2023-11-18 NOTE — PROGRESS NOTES
"  VS: Blood pressure 92/41, pulse 61, temperature 97.2  F (36.2  C), temperature source Axillary, resp. rate 16, height 1.727 m (5' 8\"), weight 54.6 kg (120 lb 5.9 oz), SpO2 100%, not currently breastfeeding.   O2: 3 liters of oxygen via NC   Output: Incontinent of bladder   Last BM: None on shift   Activity: Not oob, A2    Skin: Visible intact   Pain: No facial expression of pain noted   Neuro: Unable to assess, pt asleep   Dressing: Cast on right foot and sling on left foot    Diet:  NPO   LDA: Left PIV, infusing NaCl @ 100 mL/hr   Equipment: Personal items,    Plan: Continue POC.   Additional Info:      "

## 2023-11-18 NOTE — ANESTHESIA PROCEDURE NOTES
Airway       Patient location during procedure: ICU       Procedure Start/Stop Times: 11/18/2023 5:49 AM  Staff -        CRNA: Aisha Zuniga APRN CRNA       Other Anesthesia Staff: Corina Flores APRN CRNA       Performed By: CRNA  Consent for Airway        Urgency: emergent       Consent: The procedure was performed in an emergent situation.  Report Obtained from Primary Care Team       History regarding most recent potassium obtained: Yes       History regarding presence/absence of renal failure obtained:Yes       History regarding stroke/CVA obtained:Yes       History regarding presence/absence of NM disorder: YesIndications and Patient Condition       Indications for airway management: airway protection and altered level of consciousness       Mallampati: II     Induction type:intravenous       Mask difficulty assessment: 1 - vent by mask    Final Airway Details       Final airway type: endotracheal airway       Successful airway: ETT - single  Endotracheal Airway Details        ETT size (mm): 6.5       Cuffed: yes       Successful intubation technique: video laryngoscopy       VL Blade Size: MAC 3       Grade View of Cords: 1       Adjucts: stylet       Position: Right       Measured from: lips       Secured at (cm): 20       Bite block used: None    Post intubation assessment        ETT secured, Vent settings by primary/ICU team, Primary/ICU team to review CXR, Sedation to be ordered by primary/ICU team and No apparent complications       Placement verified by: capnometry, equal breath sounds and chest rise        Number of attempts at approach: 1       Number of other approaches attempted: 0       Secured with: commercial tube jacome       Ease of procedure: easy       Dentition: Intact and Unchanged    Medication(s) Administered   propofol (DIPRIVAN) injection 10 mg/mL vial - Intravenous, Left Hand   130 mg - 11/18/2023 5:48:00 AM  Medication Administration Time: 11/18/2023 5:49  AM    Additional Comments       Called by ICU NP to intubate patient unable to protect her airway. Preoxygenated with 100% FiO2. Intubated via CMAC 3 blade with 6.5 cuffed ETT. Vocal cords clear and open, grade 1 view. Positive ETCO2, BBS, VSS. ETT secured by Respiratory Therapy.

## 2023-11-18 NOTE — CODE/RAPID RESPONSE
Rapid Response Team Note    Assessment   In assessment a rapid response was called on Marylee Woods due to inability to protect airway. This presentation is likely due to DENNISE with accumulation of sedating medications, but have not ruled out acute intracranial anomaly.    Plan   -  Narcan without effect   -  Transfer to ICU for intubation  -  Head CT to rule out intracranial catastrophe   -  Hold heparin while awaiting head CT   -  Hold gabapentin and nuvigil, continue baclofen  -  The Internal Medicine primary team was able to be reached and they are in agreement with the above plan.  -  Disposition: The patient will be transferred to the ICU.  -  Reassessment and plan follow-up will be performed by the accepting ICU team    Marylee is critically ill with acute hypoxic respiratory failure and acute encephalopathy with obtundation. These features are alarming and indicate that the patient is at imminent risk of life-threatening organ failure. Acute deterioration is being managed by the following critical interventions: endotracheal intubation, oxygen by BVM while awaiting intubation, and vasopressors    Total Critical Care time spent by me, excluding procedures, was 60 minutes   Preeti Hendrickson NP  [facility] R[facility] Job Code Contact #[phone]  [facility] Paging/Directory    Hospital Course   Brief Summary of events leading to rapid response:     Marylee Woods is a 68 y.o. F with a PMH notable for hypthyroidism and MS. Admitted 11/16/23 after a fall suffering a fracture to her RLE complicated by DENNISE. A rapid response was called for acute obtundation at 0506 this AM. On arrival, pt was somnolent, would not arouse to sternal rub or trap squeeze. Pupils equal and at times would withdraw in bilateral upper extremities. No reports of Hx of seizures. She was on 2L at the time which escalated to 7L, other VSS. Narcan trialed without response. Given her lack of improvement and inability to protect her airway she was  transferred to ICU for intubation and ongoing evaluation of AMS. Plan for head CT this AM to rule out intracranial catastrophe.     Admission Diagnosis:   MS (multiple sclerosis) (H) [G35]  Contusion of bone [T14.8XXA]  Acute pain of right knee [M25.561]  Other closed fracture of distal end of right femur, initial encounter (H) [S72.491A]    Physical Exam   Temp: (!) 96.3  F (35.7  C) Temp  Min: 96.3  F (35.7  C)  Max: 97.2  F (36.2  C)  Resp: 22 Resp  Min: 11  Max: 31  SpO2: 100 % SpO2  Min: 93 %  Max: 100 %  Pulse: 90 Pulse  Min: 58  Max: 103    No data recorded  BP: 130/60 Systolic (24hrs), Av , Min:65 , Max:161   Diastolic (24hrs), Av, Min:41, Max:104     I/Os: No intake/output data recorded.     Exam:   General:  unarousable   Mental Status: altered level of consciousness based on unarousable  .  Resp: Shallow slow breathing pattern  CV: S1S2, no RMG, extremities warm  GI: abdomen soft    Significant Results and Procedures   Lactic Acid:   Recent Labs   Lab Test 23  0533 17  0047   LACT 1.1 1.2     CBC:   Recent Labs   Lab Test 23  0532 23  0339 23  0903   WBC 4.3 5.8 3.6*   HGB 7.6* 8.3* 9.4*   HCT 22.7* 24.5* 28.3*   * 171 177        Sepsis Evaluation   The patient is not known to have an infection.  NO EVIDENCE OF SEPSIS at this time.  Vital sign, physical exam, and lab findings are due to presumed CNS active medication accumulation in the setting of DENNISE.

## 2023-11-18 NOTE — PROGRESS NOTES
Pt not responsive, no eye response or verbal response to sternum rub only arm flexion seen. Rapid response called and provider paged at 0500. First narcan dose 0.2 mg given at 0516. Second narcan dose 0.2 mg given at 0518. Stat lab called at 0525. Pt repeatedly desat in the low 70's, oxy mask applied and oxygen bumped to 13LPM. Pt transported to ICU at 0530.

## 2023-11-19 ENCOUNTER — APPOINTMENT (OUTPATIENT)
Dept: MRI IMAGING | Facility: CLINIC | Age: 68
DRG: 533 | End: 2023-11-19
Payer: MEDICARE

## 2023-11-19 ENCOUNTER — APPOINTMENT (OUTPATIENT)
Dept: GENERAL RADIOLOGY | Facility: CLINIC | Age: 68
DRG: 533 | End: 2023-11-19
Attending: NURSE PRACTITIONER
Payer: MEDICARE

## 2023-11-19 LAB
ANION GAP SERPL CALCULATED.3IONS-SCNC: 6 MMOL/L (ref 7–15)
ANION GAP SERPL CALCULATED.3IONS-SCNC: 7 MMOL/L (ref 7–15)
APTT PPP: 35 SECONDS (ref 22–38)
BACTERIA UR CULT: ABNORMAL
BASE EXCESS BLDV CALC-SCNC: 0.6 MMOL/L (ref -7.7–1.9)
BASE EXCESS BLDV CALC-SCNC: 1.6 MMOL/L (ref -7.7–1.9)
BLD PROD TYP BPU: NORMAL
BLOOD COMPONENT TYPE: NORMAL
BUN SERPL-MCNC: 19.4 MG/DL (ref 8–23)
BUN SERPL-MCNC: 21.3 MG/DL (ref 8–23)
CALCIUM SERPL-MCNC: 8 MG/DL (ref 8.8–10.2)
CALCIUM SERPL-MCNC: 8.3 MG/DL (ref 8.8–10.2)
CHLORIDE SERPL-SCNC: 105 MMOL/L (ref 98–107)
CHLORIDE SERPL-SCNC: 106 MMOL/L (ref 98–107)
CK SERPL-CCNC: 588 U/L (ref 26–192)
CODING SYSTEM: NORMAL
CREAT SERPL-MCNC: 1.04 MG/DL (ref 0.51–0.95)
CREAT SERPL-MCNC: 1.22 MG/DL (ref 0.51–0.95)
CROSSMATCH: NORMAL
DEPRECATED HCO3 PLAS-SCNC: 26 MMOL/L (ref 22–29)
DEPRECATED HCO3 PLAS-SCNC: 27 MMOL/L (ref 22–29)
EGFRCR SERPLBLD CKD-EPI 2021: 48 ML/MIN/1.73M2
EGFRCR SERPLBLD CKD-EPI 2021: 58 ML/MIN/1.73M2
ERYTHROCYTE [DISTWIDTH] IN BLOOD BY AUTOMATED COUNT: 13.2 % (ref 10–15)
ERYTHROCYTE [DISTWIDTH] IN BLOOD BY AUTOMATED COUNT: 13.4 % (ref 10–15)
FIBRINOGEN PPP-MCNC: 448 MG/DL (ref 170–490)
GLUCOSE BLDC GLUCOMTR-MCNC: 102 MG/DL (ref 70–99)
GLUCOSE BLDC GLUCOMTR-MCNC: 125 MG/DL (ref 70–99)
GLUCOSE BLDC GLUCOMTR-MCNC: 138 MG/DL (ref 70–99)
GLUCOSE BLDC GLUCOMTR-MCNC: 87 MG/DL (ref 70–99)
GLUCOSE BLDC GLUCOMTR-MCNC: 97 MG/DL (ref 70–99)
GLUCOSE SERPL-MCNC: 108 MG/DL (ref 70–99)
GLUCOSE SERPL-MCNC: 131 MG/DL (ref 70–99)
HCO3 BLDV-SCNC: 25 MMOL/L (ref 21–28)
HCO3 BLDV-SCNC: 28 MMOL/L (ref 21–28)
HCT VFR BLD AUTO: 16.1 % (ref 35–47)
HCT VFR BLD AUTO: 18.9 % (ref 35–47)
HGB BLD-MCNC: 5.2 G/DL (ref 11.7–15.7)
HGB BLD-MCNC: 6.2 G/DL (ref 11.7–15.7)
HGB BLD-MCNC: 9 G/DL (ref 11.7–15.7)
INR PPP: 1.17 (ref 0.85–1.15)
ISSUE DATE AND TIME: NORMAL
LACTATE SERPL-SCNC: 1 MMOL/L (ref 0.7–2)
MAGNESIUM SERPL-MCNC: 1.4 MG/DL (ref 1.7–2.3)
MAGNESIUM SERPL-MCNC: 2 MG/DL (ref 1.7–2.3)
MCH RBC QN AUTO: 30.4 PG (ref 26.5–33)
MCH RBC QN AUTO: 31.3 PG (ref 26.5–33)
MCHC RBC AUTO-ENTMCNC: 32.3 G/DL (ref 31.5–36.5)
MCHC RBC AUTO-ENTMCNC: 32.8 G/DL (ref 31.5–36.5)
MCV RBC AUTO: 94 FL (ref 78–100)
MCV RBC AUTO: 96 FL (ref 78–100)
O2/TOTAL GAS SETTING VFR VENT: 21 %
O2/TOTAL GAS SETTING VFR VENT: 21 %
PCO2 BLDV: 39 MM HG (ref 40–50)
PCO2 BLDV: 49 MM HG (ref 40–50)
PH BLDV: 7.36 [PH] (ref 7.32–7.43)
PH BLDV: 7.42 [PH] (ref 7.32–7.43)
PHOSPHATE SERPL-MCNC: 1.8 MG/DL (ref 2.5–4.5)
PHOSPHATE SERPL-MCNC: 2.1 MG/DL (ref 2.5–4.5)
PLATELET # BLD AUTO: 119 10E3/UL (ref 150–450)
PLATELET # BLD AUTO: 145 10E3/UL (ref 150–450)
PO2 BLDV: 22 MM HG (ref 25–47)
PO2 BLDV: 39 MM HG (ref 25–47)
POTASSIUM SERPL-SCNC: 3.6 MMOL/L (ref 3.4–5.3)
POTASSIUM SERPL-SCNC: 4.6 MMOL/L (ref 3.4–5.3)
RBC # BLD AUTO: 1.71 10E6/UL (ref 3.8–5.2)
RBC # BLD AUTO: 1.98 10E6/UL (ref 3.8–5.2)
SODIUM SERPL-SCNC: 138 MMOL/L (ref 135–145)
SODIUM SERPL-SCNC: 139 MMOL/L (ref 135–145)
UNIT ABO/RH: NORMAL
UNIT NUMBER: NORMAL
UNIT STATUS: NORMAL
UNIT TYPE ISBT: 600
WBC # BLD AUTO: 4.3 10E3/UL (ref 4–11)
WBC # BLD AUTO: 8.2 10E3/UL (ref 4–11)

## 2023-11-19 PROCEDURE — 84100 ASSAY OF PHOSPHORUS: CPT | Performed by: NURSE PRACTITIONER

## 2023-11-19 PROCEDURE — 999N000007 HC SITE CHECK

## 2023-11-19 PROCEDURE — 250N000013 HC RX MED GY IP 250 OP 250 PS 637: Performed by: NURSE PRACTITIONER

## 2023-11-19 PROCEDURE — 250N000009 HC RX 250: Performed by: NURSE PRACTITIONER

## 2023-11-19 PROCEDURE — 82803 BLOOD GASES ANY COMBINATION: CPT

## 2023-11-19 PROCEDURE — 85384 FIBRINOGEN ACTIVITY: CPT | Performed by: NURSE PRACTITIONER

## 2023-11-19 PROCEDURE — 258N000003 HC RX IP 258 OP 636: Performed by: NURSE PRACTITIONER

## 2023-11-19 PROCEDURE — 250N000013 HC RX MED GY IP 250 OP 250 PS 637: Performed by: INTERNAL MEDICINE

## 2023-11-19 PROCEDURE — 999N000127 HC STATISTIC PERIPHERAL IV START W US GUIDANCE

## 2023-11-19 PROCEDURE — 200N000002 HC R&B ICU UMMC

## 2023-11-19 PROCEDURE — 99291 CRITICAL CARE FIRST HOUR: CPT | Mod: FS

## 2023-11-19 PROCEDURE — 71045 X-RAY EXAM CHEST 1 VIEW: CPT

## 2023-11-19 PROCEDURE — 70553 MRI BRAIN STEM W/O & W/DYE: CPT | Mod: MG

## 2023-11-19 PROCEDURE — 71045 X-RAY EXAM CHEST 1 VIEW: CPT | Mod: 26 | Performed by: RADIOLOGY

## 2023-11-19 PROCEDURE — 250N000011 HC RX IP 250 OP 636: Mod: JZ

## 2023-11-19 PROCEDURE — 85027 COMPLETE CBC AUTOMATED: CPT | Performed by: NURSE PRACTITIONER

## 2023-11-19 PROCEDURE — 36415 COLL VENOUS BLD VENIPUNCTURE: CPT | Performed by: NURSE PRACTITIONER

## 2023-11-19 PROCEDURE — 250N000011 HC RX IP 250 OP 636: Performed by: NURSE PRACTITIONER

## 2023-11-19 PROCEDURE — 85610 PROTHROMBIN TIME: CPT | Performed by: NURSE PRACTITIONER

## 2023-11-19 PROCEDURE — 36415 COLL VENOUS BLD VENIPUNCTURE: CPT

## 2023-11-19 PROCEDURE — 85730 THROMBOPLASTIN TIME PARTIAL: CPT | Performed by: NURSE PRACTITIONER

## 2023-11-19 PROCEDURE — 250N000009 HC RX 250

## 2023-11-19 PROCEDURE — 258N000003 HC RX IP 258 OP 636

## 2023-11-19 PROCEDURE — P9016 RBC LEUKOCYTES REDUCED: HCPCS | Performed by: NURSE PRACTITIONER

## 2023-11-19 PROCEDURE — 80048 BASIC METABOLIC PNL TOTAL CA: CPT

## 2023-11-19 PROCEDURE — 255N000002 HC RX 255 OP 636: Mod: JZ | Performed by: INTERNAL MEDICINE

## 2023-11-19 PROCEDURE — 85018 HEMOGLOBIN: CPT

## 2023-11-19 PROCEDURE — 82803 BLOOD GASES ANY COMBINATION: CPT | Performed by: NURSE PRACTITIONER

## 2023-11-19 PROCEDURE — 250N000011 HC RX IP 250 OP 636: Mod: JZ | Performed by: NURSE PRACTITIONER

## 2023-11-19 PROCEDURE — 999N000206 HC STATISTICAL VASC ACCESS NURSE TIME, 61+ MINUTES

## 2023-11-19 PROCEDURE — 80048 BASIC METABOLIC PNL TOTAL CA: CPT | Performed by: NURSE PRACTITIONER

## 2023-11-19 PROCEDURE — 99207 PR NO BILLABLE SERVICE THIS VISIT: CPT

## 2023-11-19 PROCEDURE — G1010 CDSM STANSON: HCPCS | Performed by: RADIOLOGY

## 2023-11-19 PROCEDURE — 70553 MRI BRAIN STEM W/O & W/DYE: CPT | Mod: 26 | Performed by: RADIOLOGY

## 2023-11-19 PROCEDURE — 250N000011 HC RX IP 250 OP 636

## 2023-11-19 PROCEDURE — A9585 GADOBUTROL INJECTION: HCPCS | Mod: JZ | Performed by: INTERNAL MEDICINE

## 2023-11-19 PROCEDURE — 82550 ASSAY OF CK (CPK): CPT | Performed by: NURSE PRACTITIONER

## 2023-11-19 PROCEDURE — 999N000157 HC STATISTIC RCP TIME EA 10 MIN

## 2023-11-19 PROCEDURE — 94003 VENT MGMT INPAT SUBQ DAY: CPT

## 2023-11-19 PROCEDURE — 83605 ASSAY OF LACTIC ACID: CPT | Performed by: NURSE PRACTITIONER

## 2023-11-19 PROCEDURE — 31622 DX BRONCHOSCOPE/WASH: CPT

## 2023-11-19 PROCEDURE — 85027 COMPLETE CBC AUTOMATED: CPT

## 2023-11-19 PROCEDURE — 250N000013 HC RX MED GY IP 250 OP 250 PS 637

## 2023-11-19 PROCEDURE — 250N000013 HC RX MED GY IP 250 OP 250 PS 637: Performed by: PEDIATRICS

## 2023-11-19 PROCEDURE — 83735 ASSAY OF MAGNESIUM: CPT | Performed by: NURSE PRACTITIONER

## 2023-11-19 PROCEDURE — G1010 CDSM STANSON: HCPCS

## 2023-11-19 RX ORDER — BACLOFEN 10 MG/1
20 TABLET ORAL AT BEDTIME
Status: DISCONTINUED | OUTPATIENT
Start: 2023-11-19 | End: 2023-11-19

## 2023-11-19 RX ORDER — PROPOFOL 10 MG/ML
5-75 INJECTION, EMULSION INTRAVENOUS CONTINUOUS
Status: DISCONTINUED | OUTPATIENT
Start: 2023-11-19 | End: 2023-11-20

## 2023-11-19 RX ORDER — LEVOTHYROXINE SODIUM 100 UG/1
100 TABLET ORAL
Status: DISCONTINUED | OUTPATIENT
Start: 2023-11-20 | End: 2023-11-30 | Stop reason: HOSPADM

## 2023-11-19 RX ORDER — POLYETHYLENE GLYCOL 3350 17 G/17G
17 POWDER, FOR SOLUTION ORAL 2 TIMES DAILY
Status: DISCONTINUED | OUTPATIENT
Start: 2023-11-19 | End: 2023-11-21

## 2023-11-19 RX ORDER — GADOBUTROL 604.72 MG/ML
0.1 INJECTION INTRAVENOUS ONCE
Status: COMPLETED | OUTPATIENT
Start: 2023-11-19 | End: 2023-11-19

## 2023-11-19 RX ORDER — DEXTROSE MONOHYDRATE 100 MG/ML
INJECTION, SOLUTION INTRAVENOUS CONTINUOUS PRN
Status: DISCONTINUED | OUTPATIENT
Start: 2023-11-19 | End: 2023-11-30 | Stop reason: HOSPADM

## 2023-11-19 RX ORDER — ACYCLOVIR 400 MG/1
400 TABLET ORAL EVERY 12 HOURS
Status: DISCONTINUED | OUTPATIENT
Start: 2023-11-19 | End: 2023-11-23

## 2023-11-19 RX ORDER — AMOXICILLIN 250 MG
1 CAPSULE ORAL 2 TIMES DAILY PRN
Status: DISCONTINUED | OUTPATIENT
Start: 2023-11-19 | End: 2023-11-21

## 2023-11-19 RX ORDER — BACLOFEN 10 MG/1
10 TABLET ORAL DAILY
Status: DISCONTINUED | OUTPATIENT
Start: 2023-11-20 | End: 2023-11-24

## 2023-11-19 RX ORDER — POTASSIUM CHLORIDE 20MEQ/15ML
40 LIQUID (ML) ORAL ONCE
Status: COMPLETED | OUTPATIENT
Start: 2023-11-19 | End: 2023-11-19

## 2023-11-19 RX ORDER — BACLOFEN 10 MG/1
10 TABLET ORAL
Status: DISCONTINUED | OUTPATIENT
Start: 2023-11-19 | End: 2023-11-30 | Stop reason: HOSPADM

## 2023-11-19 RX ORDER — FAMOTIDINE 20 MG/1
40 TABLET, FILM COATED ORAL AT BEDTIME
Status: DISCONTINUED | OUTPATIENT
Start: 2023-11-19 | End: 2023-11-30 | Stop reason: HOSPADM

## 2023-11-19 RX ORDER — MAGNESIUM SULFATE HEPTAHYDRATE 40 MG/ML
2 INJECTION, SOLUTION INTRAVENOUS ONCE
Status: COMPLETED | OUTPATIENT
Start: 2023-11-19 | End: 2023-11-19

## 2023-11-19 RX ORDER — AMOXICILLIN 250 MG
2 CAPSULE ORAL 2 TIMES DAILY PRN
Status: DISCONTINUED | OUTPATIENT
Start: 2023-11-19 | End: 2023-11-21

## 2023-11-19 RX ORDER — BACLOFEN 10 MG/1
20 TABLET ORAL AT BEDTIME
Status: DISCONTINUED | OUTPATIENT
Start: 2023-11-19 | End: 2023-11-24

## 2023-11-19 RX ORDER — POLYETHYLENE GLYCOL 3350 17 G/17G
17 POWDER, FOR SOLUTION ORAL 2 TIMES DAILY
Status: DISCONTINUED | OUTPATIENT
Start: 2023-11-19 | End: 2023-11-19

## 2023-11-19 RX ADMIN — LATANOPROST 1 DROP: 50 SOLUTION OPHTHALMIC at 09:06

## 2023-11-19 RX ADMIN — CEFTRIAXONE SODIUM 1 G: 1 INJECTION, POWDER, FOR SOLUTION INTRAMUSCULAR; INTRAVENOUS at 20:03

## 2023-11-19 RX ADMIN — CHLORHEXIDINE GLUCONATE 15 ML: 1.2 SOLUTION ORAL at 19:55

## 2023-11-19 RX ADMIN — ACYCLOVIR 400 MG: 400 TABLET ORAL at 20:07

## 2023-11-19 RX ADMIN — ACETAMINOPHEN 1000 MG: 325 TABLET, FILM COATED ORAL at 09:05

## 2023-11-19 RX ADMIN — FENTANYL CITRATE 25 MCG: 50 INJECTION INTRAMUSCULAR; INTRAVENOUS at 08:39

## 2023-11-19 RX ADMIN — FENTANYL CITRATE 25 MCG: 50 INJECTION INTRAMUSCULAR; INTRAVENOUS at 10:13

## 2023-11-19 RX ADMIN — FENTANYL CITRATE 25 MCG: 50 INJECTION INTRAMUSCULAR; INTRAVENOUS at 07:01

## 2023-11-19 RX ADMIN — Medication 600 MG: at 09:05

## 2023-11-19 RX ADMIN — BACLOFEN 20 MG: 10 TABLET ORAL at 21:42

## 2023-11-19 RX ADMIN — POTASSIUM & SODIUM PHOSPHATES POWDER PACK 280-160-250 MG 1 PACKET: 280-160-250 PACK at 09:05

## 2023-11-19 RX ADMIN — SENNOSIDES 10 ML: 8.8 LIQUID ORAL at 20:13

## 2023-11-19 RX ADMIN — FENTANYL CITRATE 25 MCG: 50 INJECTION INTRAMUSCULAR; INTRAVENOUS at 03:41

## 2023-11-19 RX ADMIN — MULTIPLE VITAMINS W/ MINERALS TAB 1 TABLET: TAB at 20:07

## 2023-11-19 RX ADMIN — GADOBUTROL 6.05 ML: 604.72 INJECTION INTRAVENOUS at 17:20

## 2023-11-19 RX ADMIN — FENTANYL CITRATE 25 MCG: 50 INJECTION INTRAMUSCULAR; INTRAVENOUS at 23:49

## 2023-11-19 RX ADMIN — POTASSIUM CHLORIDE 40 MEQ: 40 SOLUTION ORAL at 06:45

## 2023-11-19 RX ADMIN — BACLOFEN 20 MG: 10 TABLET ORAL at 09:05

## 2023-11-19 RX ADMIN — ACYCLOVIR 400 MG: 400 TABLET ORAL at 09:05

## 2023-11-19 RX ADMIN — PROPOFOL 30 MCG/KG/MIN: 10 INJECTION, EMULSION INTRAVENOUS at 19:51

## 2023-11-19 RX ADMIN — SODIUM CHLORIDE 0.8 MCG/KG/HR: 9 INJECTION, SOLUTION INTRAVENOUS at 11:05

## 2023-11-19 RX ADMIN — CHLORHEXIDINE GLUCONATE 15 ML: 1.2 SOLUTION ORAL at 09:05

## 2023-11-19 RX ADMIN — Medication 40 MG: at 06:45

## 2023-11-19 RX ADMIN — HEPARIN SODIUM 5000 UNITS: 5000 INJECTION, SOLUTION INTRAVENOUS; SUBCUTANEOUS at 21:49

## 2023-11-19 RX ADMIN — SODIUM CHLORIDE, POTASSIUM CHLORIDE, SODIUM LACTATE AND CALCIUM CHLORIDE 500 ML: 600; 310; 30; 20 INJECTION, SOLUTION INTRAVENOUS at 05:23

## 2023-11-19 RX ADMIN — MAGNESIUM SULFATE HEPTAHYDRATE 2 G: 40 INJECTION, SOLUTION INTRAVENOUS at 12:35

## 2023-11-19 RX ADMIN — SODIUM PHOSPHATE, MONOBASIC, MONOHYDRATE AND SODIUM PHOSPHATE, DIBASIC, ANHYDROUS 15 MMOL: 142; 276 INJECTION, SOLUTION INTRAVENOUS at 20:30

## 2023-11-19 RX ADMIN — POLYETHYLENE GLYCOL 3350 17 G: 17 POWDER, FOR SOLUTION ORAL at 20:07

## 2023-11-19 RX ADMIN — POTASSIUM & SODIUM PHOSPHATES POWDER PACK 280-160-250 MG 1 PACKET: 280-160-250 PACK at 12:36

## 2023-11-19 RX ADMIN — SODIUM CHLORIDE, POTASSIUM CHLORIDE, SODIUM LACTATE AND CALCIUM CHLORIDE 500 ML: 600; 310; 30; 20 INJECTION, SOLUTION INTRAVENOUS at 09:06

## 2023-11-19 RX ADMIN — MAGNESIUM SULFATE HEPTAHYDRATE 2 G: 40 INJECTION, SOLUTION INTRAVENOUS at 08:03

## 2023-11-19 RX ADMIN — ACETAMINOPHEN 1000 MG: 325 TABLET, FILM COATED ORAL at 01:06

## 2023-11-19 RX ADMIN — NITROFURANTOIN MONOHYDRATE/MACROCRYSTALS 100 MG: 25; 75 CAPSULE ORAL at 09:05

## 2023-11-19 RX ADMIN — POLYETHYLENE GLYCOL 3350 17 G: 17 POWDER, FOR SOLUTION ORAL at 12:36

## 2023-11-19 RX ADMIN — FAMOTIDINE 40 MG: 20 TABLET ORAL at 21:42

## 2023-11-19 RX ADMIN — PROPOFOL 10 MCG/KG/MIN: 10 INJECTION, EMULSION INTRAVENOUS at 13:34

## 2023-11-19 ASSESSMENT — ACTIVITIES OF DAILY LIVING (ADL)
ADLS_ACUITY_SCORE: 49
ADLS_ACUITY_SCORE: 49
ADLS_ACUITY_SCORE: 53
ADLS_ACUITY_SCORE: 53
ADLS_ACUITY_SCORE: 49

## 2023-11-19 NOTE — PROGRESS NOTES
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 On 11/17 a rapid response was called as patient was found to be obtunded and transferred to the ICU.       CHANGES and MAJOR THINGS TODAY:   - 1U PRBC  - Trend Hgb post transfusion   - Trial SBT when appropriate   - Follow Neuro assessments  - Neurology consulted  - STAT Brain MRI   - EEG to r/out possible Seizure    -Restarted propofol gtt for neuroprotection until EEG possible       PLAN:     Neuro:  # Pain and sedation  - RASS goal -1 to -2  - Switch back to propofol gtt for neuro-protection until EEG possible (unable to come today)   - Oxycodone held in the setting of being recently obtunded   - PRN Fentanyl boluses available analgesia while intubated      # Major Depressive Disorder   - Hold pta Cymbalta in the setting of DENNISE with AMS      # Altered Mental Status    # Possible NCS Seizures  Rapid response was called 11/17 as patient was found to be obtunded and non-arousable. Attempts to arousing patient including Narcan were unsuccessful and was later transferred here. With concerns for protecting her airway, patient was intubated. Ruling out infectious process vs. Metabolic vs. Drug induced. Concern patient has DENNISE, and sedating medications contributing to being obtunded. CXR not concerning for pneumonia, WBC, lactate, and procalcitonin normal. However found to have UTI so could be contributing to AMS. Head CT without acute pathology.     Despite sedation holiday this morning patient still not following commands and notable cyclic tongue/gumming of  ETT. Cannot rule out possible NCS seizures and could consider herpes simplex encephalopathy and brain MRI can give us more information.   - Hold pta Nuvigil for sleep, to reduce AMS   - Hold oxycodone as above  - Hold pta Cymbalta as above for AMS   - Neuro assessments per unit protocol   - Head CT without acute pathology   - CBC, BMP daily   - Obtain Sputum sample as able   - Ensure delirium prevention   - Neurology consulted   - Brain MRI   - EEG monitoring (unable to come today), will come tomorrow  - Restart Propofol as above      # Progressive Multiple Sclerosis (wheelchair bound)   Patient has longstanding diagnosis of progressive MS now wheelchair bound. Appears she sees Immunology and Neurology outpatient basis. Last brain MRI in 2017 showing suspicion of demyelinating disease. Unsure of exact biologics patient has was on, appears she has received Lemtrada (2017, 2019), and other past DMTs included Betaseron, Aubagio.   - Reduce PTA  baclofen 20 mg ->10mg daily, and 30 mg -> 20mg at bedtime given AMS and DENNISE   - Hold pta oxybutynin for neurogenic bladder 2/2 MS   - Brain MRI as above         Pulmonary:  # Sleep apnea 2/2 MS   Appears patient follows with sleep clinic/doctor and found to have sleep apnea 2/2 her MS   -Wears a CPAP at home      # Tobacco use; current smoker?   - Hx of tobacco use, and appears she is current      # Chronic hypercarbia?   Patient was found to be obtunded 11/17, unable to arouse despite Narcan d/t concerns r/t pain medication use from femur fracture, however this was unsuccessful. Patient was then transferred to ICU and do to concerns of protecting her airway she was intubated. Additionally, patient has longstanding tobacco use, and not able to see recent PFTs, but concerns she could be chronic co2 retainer at baseline and should consider outpatient testing. CXR obtained post intubation does not appear to show infectious processes.   - Vent settings: VC               -Fio2 21%                 -TV: 400               -PEEP 5                -RR 12    -Goal pH >7.20; P/F ratio >150, Ppeak/Plat </=30, SpO2 >92%  -Repeat VBG this afternoon   -Pulmonary toilet               -Oral hygiene with chlorhexidine  -Obtain sputum sample if able    -Trailed SBT; failed immediately. Patient no longer appropriate as unable to follow any commands and sedating due to neuroprotection until further eval with EEG and neurology      # History of pulmonary nodules   Recent CT Chest from 6/16/23 shows right lower lobe nodule has resolved with a stable left lower lobe nodule.   - Now on yearly chest CT follow-ups      Cardiovascular:  # Hx of hypertension   Patient has been on/off levophed. Has received a total of 1.5L of crystalloid.   - Hold pta hydrochlorothiazide in the setting of hemodynamic changes   - Norepinephrine for MAP goal 65      GI/Nutrition:  # GERD   - pantoprazole 40 mg suspension daily   - Feeding tube okay to use      Renal/Fluids/Electrolytes:  # DENNISE   # CKD   # Reccurent UTI   Patients baseline creatinine around 0.96-1.08, now downtrending from 2.11 and now 1.04. DENNISE with CKD concerns for any sedating related medications could have caused patient to become obtunded. Confirmed UTI as below; previous recurrent UTI with klebsiella pneuomiae as well.   - Reduce nephrotoxic agents as able   - Monitor I/Os   - UC growing Klebsiella pneumoniae 11/19  - Continue ceftriaxone 1g q24h (prior cx sensitive)  - Daily BMP   - Mg, K, Phos protocols      Endocrine:  # Graves s/p thyroid ablation   Not inclined that acute AMS/Obtunded is related to metabolic issues.   - TSH 0.64   - Continue pta synthroid 100 mcg daily   - Hypoglycemia protocol      ID:  # Herpes simplex virus; prophylaxis on Acyclovir   - Continue pta Acyclovir 400 mg table BID      # Recurrent UTIs   Suspect AMS related to reccurrent UTI  - Stopped PTA macrobid  - Continue ceftriaxone 1g q24h   - UC with Klebsiella pneumoniae 10/7 and 11/19  -  NGTD 11/19 on blood cx      Hematology/Oncology:    # Anemia of chronic disease 2/2 CKD   # Thrombocytopenia   Hgb with downtrend; 6.2 this morning. No acute signs of bleeding, and patient received a total of 1.5L of fluid so could be partially dilutional.   - Hold pta ferrous sulfate in the setting of DENNISE   - Daily CBC   - 1 U PRBC give   - Trend Hgb     # Ductal carcinoma in situ of left breast   -Follows with outpatient radiation/oncology and medical oncology annually     Musculoskeletal:  # Two outpatient mechanical falls   # Right distal femur fracture and left malleolus fracture  # Osteoporosis   - Orthopedics following                -No plans for surgery (has significant amount of hardware from prior orthopedic injuries)                -NWB to BLE  - Monitor pulses      Skin:  No acute issues         General Cares/Prophylaxis:    DVT Prophylaxis: Pneumatic Compression Devices, SubQ Heparin (holding given anemia)  GI Prophylaxis: PPI  Restraints: Soft wrist restraints    Family Communication: Manuel () updated this morning   Code Status: Full      Lines/tubes/drains:  - PIV   - Verduzco     Disposition:  - [facility]     Patient seen and findings/plan discussed with medical ICU staff, Dr. Jacobs.    SWAPNIL Blair CNP    Total patient care time includes 45 minutes       ====================================  INTERVAL HISTORY:   Patient remains intubated and sedated. Not following commands, but will open eyes, however not tracking. Has occasionally cyclic movement/gumming of ETT. Has been on/off levophed for BP support.     OBJECTIVE:   1. VITAL SIGNS:   Temp:  [97.7  F (36.5  C)-100.9  F (38.3  C)] 98.4  F (36.9  C)  Pulse:  [] 124  Resp:  [11-33] 24  BP: ()/() 151/57  FiO2 (%):  [21 %] 21 %  SpO2:  [91 %-100 %] 98 %  Vent Mode: CMV/AC  (Continuous Mandatory Ventilation/ Assist Control)  FiO2 (%): 21 %  Resp Rate (Set): 12 breaths/min  Tidal Volume (Set, mL): 400  mL  PEEP (cm H2O): 5 cmH2O  Resp: 24    2. INTAKE/ OUTPUT:   I/O last 3 completed shifts:  In: 1862.36 [I.V.:202.36; NG/GT:160; IV Piggyback:1500]  Out: 1025 [Urine:1025]    3. PHYSICAL EXAMINATION:  General: Withdrawals upper extremities to stimulation, otherwise sedated   HEENT: atraumatic, normocephalic, PERRL  Neuro: Sedated, withdrawal upper extremities to stimulation, pupils round, sluggish reactions. Cyclic mouth/gumming of ETT. Will open eyes, does not track or follow commands   Pulm/Resp: Clear breath sounds bilaterally without rhonchi, crackles or wheeze, breathing synchronous with the ventilator   CV: RRR, S3 or S4 heart sound, no palpitation, murmurs or rub auscultated   Abdomen: Soft, non-distended, non-tender  : cazares catheter in place, urine yellow and clear  Incisions/Skin: Intact, without erythema  MSK: <3 seconds capillary refill bilaterally, right long lower extremity cast, left knee brace placed   4. LABS:     Venous Blood Gas  Recent Labs   Lab 11/19/23  0519 11/18/23  1838 11/18/23  1313 11/18/23  0831 11/18/23  0533   PHV 7.42 7.40 7.37  --  7.27*   PCO2V 39* 44 47  --  65*   PO2V 39 28 39  --  28   HCO3V 25 27 27  --  30*   INDERJIT 0.6 2.2* 1.1  --  2.1*   O2PER 21 21 21 21 32         Arterial Blood Gases   Recent Labs   Lab 11/18/23  0831   PH 7.51*   PCO2 30*   PO2 97   HCO3 24     Complete Blood Count   Recent Labs   Lab 11/19/23  0609 11/19/23  0519 11/18/23  0532 11/17/23  0339   WBC 8.2 4.3 4.3 5.8   HGB 6.2* 5.2* 7.6* 8.3*   * 119* 127* 171     Basic Metabolic Panel  Recent Labs   Lab 11/19/23  0519 11/19/23  0505 11/19/23  0001 11/18/23  1947 11/18/23  1837 11/18/23  0543 11/18/23  0532 11/17/23  1130 11/17/23  0339     --   --   --  141  --  138  --  135   POTASSIUM 3.6  --   --   --  3.2*  --  3.5  --  3.5   CHLORIDE 105  --   --   --  104  --  102  --  97*   CO2 27  --   --   --  26  --  30*  --  31*   BUN 21.3  --   --   --  21.6  --  22.4  --  18.4   CR 1.22*  --   --    --  1.40*  --  2.11*  --  1.74*   * 102* 97 101* 100*   < > 99   < > 125*    < > = values in this interval not displayed.     Liver Function Tests  Recent Labs   Lab 11/19/23  0608 11/18/23  0532 11/17/23  1215   AST  --  32  --    ALT  --  16  --    ALKPHOS  --  61  --    BILITOTAL  --  0.2  --    ALBUMIN  --  3.4*  --    INR 1.17*  --  1.26*     Coagulation Profile  Recent Labs   Lab 11/19/23  0608 11/17/23  1215   INR 1.17* 1.26*   PTT 35 30       5. RADIOLOGY:   Recent Results (from the past 24 hour(s))   XR Chest Port 1 View    Narrative    Exam: XR CHEST PORT 1 VIEW, 11/19/2023 3:45 AM    Comparison: Chest x-ray 11/18/2023    History: ETT positioning    Findings:  Single portable supine view of the chest. Interval slight retraction  of the endotracheal tube with tip now projecting 5 cm above the  charlie, previously 2.8 cm. Enteric tube courses below the diaphragm  and out of the field-of-view. Trachea is midline. Cardiomediastinal  silhouette is within normal limits. No focal airspace opacity. No  pneumothorax or pleural effusion. The visualized upper abdomen is  unremarkable. No acute osseous abnormalities.      Impression    Impression:   Slight interval retraction of the endotracheal tube with tip now  projecting 5 cm above the charlie, previously 3.8 cm.    I have personally reviewed the examination and initial interpretation  and I agree with the findings.    SUKHWINDER REZA MD         SYSTEM ID:  [id number]   IMAGING:      Recent Results (from the past 24 hour(s))   CT Knee Right w/o Contrast     Narrative     CT KNEE RIGHT WITHOUT CONTRAST 11/17/2023 11:27 AM     CLINICAL HISTORY: distal femur fracture, rule out Hoffa's fragment.  Right knee pain.     TECHNIQUE: CT scan of the abdomen and pelvis was performed without IV  contrast. Multiplanar reformats were obtained. Dose reduction  techniques were used.  CONTRAST: None.     COMPARISON: 11/17/2023 radiographs.     FINDINGS:   Bones: There is an  acute comminuted moderately displaced fracture of  the distal femoral metaphysis without significant angulation at the  fracture site. There are a few anteriorly displaced cortical fracture  fragments, as seen on series 2 image 23 and series 4 image 135.  Portions of this fracture extended intra-articularly at the level of  the trochlear groove as seen on series 2 image 40 and the anterior  portion of the intercondylar notch. Osteopenia. No evidence of  additional fractures.     Soft tissues: There is some more defined soft tissue stranding around  the femoral fracture site. There is moderate global atrophy of the  visualized knee and proximal calf musculature. Small knee joint  effusion. Evidence of intra-articular fracture fragments.        Impression     IMPRESSION:   1.  Acute comminuted mildly displaced fracture of the distal femoral  metaphysis with intra-articular extension.     JOCELYNN LYON MD         SYSTEM ID:  [id number]   XR Chest Port 1 View     Impression     RESIDENT PRELIMINARY INTERPRETATION  Impression: Endotracheal tube tip projects over the high/mid thoracic  trachea. The chest is relatively clear.   XR Abdomen Port 1 View     Impression     RESIDENT PRELIMINARY INTERPRETATION  IMPRESSION: Enteric tube tip and sidehole project over the stomach.      CT HEAD W/O CONTRAST 11/18/2023 9:23 AM     Provided History: AMS w/hx of recent trauma, rule out ICH     Comparison: MRI brain on 6 1/17, CT head 11/1/2017.     Technique: Using multidetector thin collimation helical acquisition  technique, axial, coronal and sagittal CT images from the skull base  to the vertex were obtained without intravenous contrast.      Findings:    No intracranial hemorrhage. No mass effect. No midline shift. No  extra-axial fluid collection. The gray to white matter differentiation  of the cerebral hemispheres is preserved. Ventricles are proportionate  to the sulci. No sulcal effacement. The basal cisterns are  patent.  Moderate diffuse cerebral volume loss. Periventricular white matter  hypodensities which are nonspecific, but likely represent chronic  small vessel ischemic disease.     The visualized paranasal sinuses are clear. Left mastoid air cells are  clear. Chronic opacification of the right mastoid air cells.. Orbits  appear unremarkable. No acute fracture.                                                                      Impression:   1. No acute intracranial pathology.  2. Chronic changes including leukoaraiosis, diffuse cerebral volume  loss, right mastoid air cell opacification.

## 2023-11-19 NOTE — PROGRESS NOTES
Major Shift Events:  Pt Neuro status labile - sedation off at 1600.  Head CT completed per order. Tmax 100.9. Patient maintaining MAP > 65 w/o intervention. Vent setting stable.Unable to obtain sputum sample due to scant production. Verduzco placed x2, Urine output inadequate. U/A and blood cultures completed.  Extend dwell placed for frequent lab draws. SO updated       Plan: Monitor neurology status and Urine output. Spontaneous breathing trial if neuro status improves.     For vital signs and complete assessments, please see documentation flowsheets.

## 2023-11-19 NOTE — PLAN OF CARE
"10A ICU End of Shift Summary.     Changes this shift: Pt tongued/gummed ETT and OG tube out of mouth slightly, ABHISHEK notified, xray obtained, and RT advanced tubes 1cm- ETT now 21 at gum and OG 60 at gum. Hgb of 5.2, recheck of 6.2- per ABHISHEK \"will call  later in morning to get consent for blood.\" Per ortho resident \"ok to take off brace to L knee, eventually will have a boot to L foot.\"  Neuro: Patient not following commands, occasionally opens eyes but does not track. RASS of -3 to +2, precedex drip restarted. Soft wrist restraints in place.  Cardiac: BP labile throughout shift. Levophed drip restarted to maintain MAP goal >65. Tachy occasionally, sinus rhythm throughout.   Respiratory: ETT 6.5mm 21 at gums. Moderate creamy thick secretions suctioned from ETT inline.      Mechanical Ventilator Setting: CMV  FiO2: 21%  Rate: 12  Volume: 400  PEEP 5     GI/: No BM this shift. Verduzco in place with low output.  Diet/appetite: NPO  Pain: CPOT of 6- gave fentanyl twice with relief. Pain to L foot/leg.  Skin: Prophylactic mepilex in place to sacrum. Cast to R leg.  LDA's: PIV to RFA and extended dwell to JANET.  Activities: Q2h turns.    Drips: Precedex at 0.8. Levophed at 0.03.    See flowsheets for vital signs and detailed assessment.     "

## 2023-11-19 NOTE — PROGRESS NOTES
Orthopaedic Surgery Progress Note 11/19/2023    E: PELON    S: intubated/sedated     O:  Temp: 98.4  F (36.9  C) Temp src: Esophageal BP: (!) 151/57 Pulse: (!) 124   Resp: 24 SpO2: 98 % O2 Device: Mechanical Ventilator      Exam:  Gen: intubated/sedated   Resp: ventilated     MSK:  RLE  No bruising or skin breakdown.  Foot WWP    LLE  Cast in place, well fitting. No skin breakdown around edges.   Toes WWP        Recent Labs   Lab 11/19/23  0609 11/19/23  0519 11/18/23  0532   WBC 8.2 4.3 4.3   HGB 6.2* 5.2* 7.6*   * 119* 127*       X-rays: complete    Assessment: Marylee Woods is a 68 year old female who sustained a fall from her wheelchair on 11/16 and was found to have a right distal femur fracture and minimally displaced left medial malleolus fracture. Plan to treat both injuries non operatively, right leg in a long leg cast and left ankle in CAM boot. While intubated/sedated no need for CAM boot on left leg due to concern for skin breakdown. Please check for skin breakdown around edges of cast and contact orthopedic surgery for any concerns.       Plan:  Medicine Primary  Activity: NWB BLE  Weight bearing status: NWB BLE  Antibiotics: none  Diet: per primary   DVT prophylaxis: per primary   Bracing/Splinting: right leg long leg cast to be kept clean dry and intact. Left leg cam boot once no longer intubated/sedated   X-rays: post casting films right leg pending   Physical Therapy: ADL's  Occupational Therapy: ADL's  Follow-up: TBD    Disposition: TBD      Jude Moraes MD 11/19/2023  Orthopaedic Surgery Resident, PGY4

## 2023-11-19 NOTE — PROGRESS NOTES
Called by ICU.  68F admitted with a femur fracture.  Rapid called for encephalopathy.  No seizures seen.  Weaned sedation this AM and patient not waking up.   No seizure activity in limbs or eyes but reports there are tongue movements. Tongue movements went away with fentanyl.   Patient has DENNISE and UTI.   Hemoglobin is 5.        Recommend EEG.  If unable to be completed today and concerned for ongoing seizure activity can give sedation overnight.   DENNISE improving.  Would slightly lower dose of baclofen.  Do not stop altogether.  Limit CNS acting meds as able.  If unable to get EEG done on weekend given low hemoglobin and severe hypotension this AM would consider Brain MRI.  Will be seen by Neurology tomorrow.  Call if further questions or concerns. Discussed with ICU staff.       Diana Sandoval, DO

## 2023-11-20 ENCOUNTER — APPOINTMENT (OUTPATIENT)
Dept: GENERAL RADIOLOGY | Facility: CLINIC | Age: 68
DRG: 533 | End: 2023-11-20
Payer: MEDICARE

## 2023-11-20 ENCOUNTER — ANCILLARY PROCEDURE (OUTPATIENT)
Dept: NEUROLOGY | Facility: CLINIC | Age: 68
DRG: 533 | End: 2023-11-20
Attending: PSYCHIATRY & NEUROLOGY
Payer: MEDICARE

## 2023-11-20 LAB
ANION GAP SERPL CALCULATED.3IONS-SCNC: 10 MMOL/L (ref 7–15)
BUN SERPL-MCNC: 16.2 MG/DL (ref 8–23)
CALCIUM SERPL-MCNC: 8.2 MG/DL (ref 8.8–10.2)
CHLORIDE SERPL-SCNC: 104 MMOL/L (ref 98–107)
CREAT SERPL-MCNC: 0.9 MG/DL (ref 0.51–0.95)
DEPRECATED HCO3 PLAS-SCNC: 25 MMOL/L (ref 22–29)
EGFRCR SERPLBLD CKD-EPI 2021: 69 ML/MIN/1.73M2
ERYTHROCYTE [DISTWIDTH] IN BLOOD BY AUTOMATED COUNT: 18 % (ref 10–15)
ERYTHROCYTE [DISTWIDTH] IN BLOOD BY AUTOMATED COUNT: 18.1 % (ref 10–15)
GLUCOSE BLDC GLUCOMTR-MCNC: 100 MG/DL (ref 70–99)
GLUCOSE BLDC GLUCOMTR-MCNC: 133 MG/DL (ref 70–99)
GLUCOSE BLDC GLUCOMTR-MCNC: 162 MG/DL (ref 70–99)
GLUCOSE BLDC GLUCOMTR-MCNC: 76 MG/DL (ref 70–99)
GLUCOSE BLDC GLUCOMTR-MCNC: 88 MG/DL (ref 70–99)
GLUCOSE BLDC GLUCOMTR-MCNC: 97 MG/DL (ref 70–99)
GLUCOSE SERPL-MCNC: 90 MG/DL (ref 70–99)
HCT VFR BLD AUTO: 21.4 % (ref 35–47)
HCT VFR BLD AUTO: 24.1 % (ref 35–47)
HGB BLD-MCNC: 7 G/DL (ref 11.7–15.7)
HGB BLD-MCNC: 8.1 G/DL (ref 11.7–15.7)
HGB BLD-MCNC: 8.3 G/DL (ref 11.7–15.7)
MAGNESIUM SERPL-MCNC: 2 MG/DL (ref 1.7–2.3)
MCH RBC QN AUTO: 29.3 PG (ref 26.5–33)
MCH RBC QN AUTO: 29.4 PG (ref 26.5–33)
MCHC RBC AUTO-ENTMCNC: 32.7 G/DL (ref 31.5–36.5)
MCHC RBC AUTO-ENTMCNC: 34.4 G/DL (ref 31.5–36.5)
MCV RBC AUTO: 86 FL (ref 78–100)
MCV RBC AUTO: 90 FL (ref 78–100)
PHOSPHATE SERPL-MCNC: 2 MG/DL (ref 2.5–4.5)
PHOSPHATE SERPL-MCNC: 2.6 MG/DL (ref 2.5–4.5)
PHOSPHATE SERPL-MCNC: 2.9 MG/DL (ref 2.5–4.5)
PLATELET # BLD AUTO: 132 10E3/UL (ref 150–450)
PLATELET # BLD AUTO: 154 10E3/UL (ref 150–450)
POTASSIUM SERPL-SCNC: 4.3 MMOL/L (ref 3.4–5.3)
RBC # BLD AUTO: 2.39 10E6/UL (ref 3.8–5.2)
RBC # BLD AUTO: 2.82 10E6/UL (ref 3.8–5.2)
SODIUM SERPL-SCNC: 139 MMOL/L (ref 135–145)
WBC # BLD AUTO: 4.2 10E3/UL (ref 4–11)
WBC # BLD AUTO: 7.8 10E3/UL (ref 4–11)

## 2023-11-20 PROCEDURE — 71045 X-RAY EXAM CHEST 1 VIEW: CPT

## 2023-11-20 PROCEDURE — 85027 COMPLETE CBC AUTOMATED: CPT | Performed by: NURSE PRACTITIONER

## 2023-11-20 PROCEDURE — 250N000009 HC RX 250: Performed by: NURSE PRACTITIONER

## 2023-11-20 PROCEDURE — 250N000011 HC RX IP 250 OP 636: Performed by: NURSE PRACTITIONER

## 2023-11-20 PROCEDURE — 250N000011 HC RX IP 250 OP 636

## 2023-11-20 PROCEDURE — 250N000013 HC RX MED GY IP 250 OP 250 PS 637: Performed by: PEDIATRICS

## 2023-11-20 PROCEDURE — 250N000011 HC RX IP 250 OP 636: Mod: JZ

## 2023-11-20 PROCEDURE — 80048 BASIC METABOLIC PNL TOTAL CA: CPT

## 2023-11-20 PROCEDURE — 36415 COLL VENOUS BLD VENIPUNCTURE: CPT

## 2023-11-20 PROCEDURE — 258N000003 HC RX IP 258 OP 636

## 2023-11-20 PROCEDURE — 86923 COMPATIBILITY TEST ELECTRIC: CPT

## 2023-11-20 PROCEDURE — 99207 EEG VIDEO 2-12 HRS UNMONITORED: CPT | Performed by: PSYCHIATRY & NEUROLOGY

## 2023-11-20 PROCEDURE — 99291 CRITICAL CARE FIRST HOUR: CPT | Mod: FS | Performed by: EMERGENCY MEDICINE

## 2023-11-20 PROCEDURE — 36415 COLL VENOUS BLD VENIPUNCTURE: CPT | Performed by: NURSE PRACTITIONER

## 2023-11-20 PROCEDURE — 250N000009 HC RX 250

## 2023-11-20 PROCEDURE — 36416 COLLJ CAPILLARY BLOOD SPEC: CPT | Performed by: NURSE PRACTITIONER

## 2023-11-20 PROCEDURE — 999N000157 HC STATISTIC RCP TIME EA 10 MIN

## 2023-11-20 PROCEDURE — 85027 COMPLETE CBC AUTOMATED: CPT

## 2023-11-20 PROCEDURE — 250N000013 HC RX MED GY IP 250 OP 250 PS 637: Performed by: NURSE PRACTITIONER

## 2023-11-20 PROCEDURE — 86901 BLOOD TYPING SEROLOGIC RH(D): CPT | Performed by: INTERNAL MEDICINE

## 2023-11-20 PROCEDURE — 250N000013 HC RX MED GY IP 250 OP 250 PS 637: Performed by: INTERNAL MEDICINE

## 2023-11-20 PROCEDURE — 95711 VEEG 2-12 HR UNMONITORED: CPT

## 2023-11-20 PROCEDURE — 36416 COLLJ CAPILLARY BLOOD SPEC: CPT

## 2023-11-20 PROCEDURE — 84100 ASSAY OF PHOSPHORUS: CPT

## 2023-11-20 PROCEDURE — 94003 VENT MGMT INPAT SUBQ DAY: CPT

## 2023-11-20 PROCEDURE — 200N000002 HC R&B ICU UMMC

## 2023-11-20 PROCEDURE — 250N000013 HC RX MED GY IP 250 OP 250 PS 637

## 2023-11-20 PROCEDURE — 84100 ASSAY OF PHOSPHORUS: CPT | Performed by: NURSE PRACTITIONER

## 2023-11-20 PROCEDURE — 258N000003 HC RX IP 258 OP 636: Performed by: NURSE PRACTITIONER

## 2023-11-20 PROCEDURE — 95718 EEG PHYS/QHP 2-12 HR W/VEEG: CPT | Performed by: PSYCHIATRY & NEUROLOGY

## 2023-11-20 PROCEDURE — 71045 X-RAY EXAM CHEST 1 VIEW: CPT | Mod: 26 | Performed by: RADIOLOGY

## 2023-11-20 PROCEDURE — 83735 ASSAY OF MAGNESIUM: CPT

## 2023-11-20 PROCEDURE — 85018 HEMOGLOBIN: CPT

## 2023-11-20 PROCEDURE — 99223 1ST HOSP IP/OBS HIGH 75: CPT | Performed by: PSYCHIATRY & NEUROLOGY

## 2023-11-20 RX ORDER — MAGNESIUM OXIDE 400 MG/1
400 TABLET ORAL EVERY 4 HOURS
Status: COMPLETED | OUTPATIENT
Start: 2023-11-20 | End: 2023-11-20

## 2023-11-20 RX ORDER — OXYCODONE HYDROCHLORIDE 5 MG/1
5 TABLET ORAL EVERY 6 HOURS PRN
Status: DISCONTINUED | OUTPATIENT
Start: 2023-11-20 | End: 2023-11-20

## 2023-11-20 RX ADMIN — Medication 40 MG: at 06:34

## 2023-11-20 RX ADMIN — ACETAMINOPHEN 1000 MG: 325 TABLET, FILM COATED ORAL at 05:30

## 2023-11-20 RX ADMIN — HEPARIN SODIUM 5000 UNITS: 5000 INJECTION, SOLUTION INTRAVENOUS; SUBCUTANEOUS at 22:00

## 2023-11-20 RX ADMIN — PROPOFOL 20 MCG/KG/MIN: 10 INJECTION, EMULSION INTRAVENOUS at 08:10

## 2023-11-20 RX ADMIN — FAMOTIDINE 40 MG: 20 TABLET ORAL at 22:00

## 2023-11-20 RX ADMIN — HEPARIN SODIUM 5000 UNITS: 5000 INJECTION, SOLUTION INTRAVENOUS; SUBCUTANEOUS at 13:52

## 2023-11-20 RX ADMIN — Medication 600 MG: at 08:22

## 2023-11-20 RX ADMIN — CHLORHEXIDINE GLUCONATE 15 ML: 1.2 SOLUTION ORAL at 19:42

## 2023-11-20 RX ADMIN — BACLOFEN 10 MG: 10 TABLET ORAL at 08:22

## 2023-11-20 RX ADMIN — SODIUM CHLORIDE 0.4 MCG/KG/HR: 9 INJECTION, SOLUTION INTRAVENOUS at 14:38

## 2023-11-20 RX ADMIN — SODIUM CHLORIDE 0.9 MCG/KG/HR: 9 INJECTION, SOLUTION INTRAVENOUS at 23:45

## 2023-11-20 RX ADMIN — SODIUM PHOSPHATE, MONOBASIC, MONOHYDRATE AND SODIUM PHOSPHATE, DIBASIC, ANHYDROUS 9 MMOL: 142; 276 INJECTION, SOLUTION INTRAVENOUS at 06:49

## 2023-11-20 RX ADMIN — MAGNESIUM OXIDE TAB 400 MG (241.3 MG ELEMENTAL MG) 400 MG: 400 (241.3 MG) TAB at 12:13

## 2023-11-20 RX ADMIN — POLYETHYLENE GLYCOL 3350 17 G: 17 POWDER, FOR SOLUTION ORAL at 08:22

## 2023-11-20 RX ADMIN — PROPOFOL 40 MCG/KG/MIN: 10 INJECTION, EMULSION INTRAVENOUS at 02:00

## 2023-11-20 RX ADMIN — CHLORHEXIDINE GLUCONATE 15 ML: 1.2 SOLUTION ORAL at 08:22

## 2023-11-20 RX ADMIN — SENNOSIDES 10 ML: 8.8 LIQUID ORAL at 08:31

## 2023-11-20 RX ADMIN — POLYETHYLENE GLYCOL 3350 17 G: 17 POWDER, FOR SOLUTION ORAL at 19:42

## 2023-11-20 RX ADMIN — CEFTRIAXONE SODIUM 1 G: 1 INJECTION, POWDER, FOR SOLUTION INTRAMUSCULAR; INTRAVENOUS at 19:39

## 2023-11-20 RX ADMIN — ACYCLOVIR 400 MG: 400 TABLET ORAL at 08:22

## 2023-11-20 RX ADMIN — HEPARIN SODIUM 5000 UNITS: 5000 INJECTION, SOLUTION INTRAVENOUS; SUBCUTANEOUS at 05:34

## 2023-11-20 RX ADMIN — SENNOSIDES 10 ML: 8.8 LIQUID ORAL at 19:47

## 2023-11-20 RX ADMIN — FENTANYL CITRATE 50 MCG: 50 INJECTION INTRAMUSCULAR; INTRAVENOUS at 17:40

## 2023-11-20 RX ADMIN — LEVOTHYROXINE SODIUM 100 MCG: 100 TABLET ORAL at 07:31

## 2023-11-20 RX ADMIN — ACETAMINOPHEN 1000 MG: 325 TABLET, FILM COATED ORAL at 17:40

## 2023-11-20 RX ADMIN — LATANOPROST 1 DROP: 50 SOLUTION OPHTHALMIC at 08:29

## 2023-11-20 RX ADMIN — FENTANYL CITRATE 25 MCG: 50 INJECTION INTRAMUSCULAR; INTRAVENOUS at 08:22

## 2023-11-20 RX ADMIN — MULTIPLE VITAMINS W/ MINERALS TAB 1 TABLET: TAB at 19:42

## 2023-11-20 RX ADMIN — Medication 2.5 MG: at 22:00

## 2023-11-20 RX ADMIN — ACYCLOVIR 400 MG: 400 TABLET ORAL at 19:42

## 2023-11-20 RX ADMIN — MAGNESIUM OXIDE TAB 400 MG (241.3 MG ELEMENTAL MG) 400 MG: 400 (241.3 MG) TAB at 05:42

## 2023-11-20 RX ADMIN — BACLOFEN 20 MG: 10 TABLET ORAL at 22:00

## 2023-11-20 RX ADMIN — OXYCODONE HYDROCHLORIDE 5 MG: 5 TABLET ORAL at 12:13

## 2023-11-20 ASSESSMENT — ACTIVITIES OF DAILY LIVING (ADL)
ADLS_ACUITY_SCORE: 55
ADLS_ACUITY_SCORE: 55
ADLS_ACUITY_SCORE: 59
ADLS_ACUITY_SCORE: 55
ADLS_ACUITY_SCORE: 49
ADLS_ACUITY_SCORE: 55
ADLS_ACUITY_SCORE: 51
ADLS_ACUITY_SCORE: 51
ADLS_ACUITY_SCORE: 49
ADLS_ACUITY_SCORE: 51

## 2023-11-20 NOTE — CONSULTS
[facility]  Neurology Consultation    Patient Name:  Marylee Woods  MRN:  [id number]    :  1955  Date of Service:  2023  Primary care provider:  Carolina Pulliam      Neurology consultation service was asked to see Marylee Woods by Dr. Estefania KRAFT to evaluate encephalopathy and possible non convulsive seizures.    Chief Complaint:  intubated    History of Present Illness:   Marylee Woods is a 68 year old female with history of multiple sclerosis, previously treated with Lemtrada who presents with a fall and was felt to have a distal femur fracture.  History primarily obtained from  who is at bedside.  States she had previous fractured that leg and had hardware.  Due to this orthopedics recommended conservative management.  She was casted.   states he left her the first night around midnight and she doing well.  She was alert, talkative, interactive.  At some point over the night with which she was given baclofen, Cymbalta, Dilaudid, oxycodone she became acutely altered.  Rapid response notes that she was obtunded.  She required intubation due to concern for ability to protect her airway.  She was sent to the ICU, and head CT was obtained which was reassuring.   While in ICU she was not waking up when sedation was held.  They noted she was not blinking to threat.  They also noticed movements of her tongue and jaw which cause concern for nonconvulsive seizures and prompted neurology consult.     provides much additional history today.  It sounds like she has quite severe MS given that she is treated with elotuzumab and is wheelchair-bound.  States at baseline she can walk with grab bars in rehab for 18 feet.  He states she is extremely sensitive to medications.  He states she has gotten very altered when she has been ill with things such as urinary tract infections in the past.  In fact, he states that she has had episodes at home  where she does not wake up or respond for up to 36 hours which have been frequent enough that he did not seek treatment.  He notes she is very sensitive to medications, and states her presentation with the amount of medications and finding urinary tract infection is not entirely unexpected.    Hemoglobin was 5.2 and blood pressure was noted to be as low as 63/34 around time of consult.  She was found to have DENNISE and klebsiella UTI     ROS  A comprehensive ROS was performed and pertinent findings were included in HPI.     PMH  Past Medical History:   Diagnosis Date    Atypical ductal hyperplasia of left breast 02/2022    Depression     Gastro-oesophageal reflux disease     Graves disease     Multiple sclerosis (H)     Osteoporosis     Postablative hypothyroidism      Past Surgical History:   Procedure Laterality Date    C/SECTION, LOW TRANSVERSE  1992    COLONOSCOPY  2007    COLONOSCOPY N/A 1/26/2017    Procedure: COMBINED COLONOSCOPY, SINGLE OR MULTIPLE BIOPSY/POLYPECTOMY BY BIOPSY;  Surgeon: Mayo Adkins MD, MD;  Location:  GI    ESOPHAGOSCOPY, GASTROSCOPY, DUODENOSCOPY (EGD), COMBINED  1/26/2017    Dr. Adkins [facility]    ESOPHAGOSCOPY, GASTROSCOPY, DUODENOSCOPY (EGD), COMBINED N/A 1/26/2017    Procedure: COMBINED ESOPHAGOSCOPY, GASTROSCOPY, DUODENOSCOPY (EGD), BIOPSY SINGLE OR MULTIPLE;  Surgeon: Mayo Adkins MD, MD;  Location:  GI    ESOPHAGOSCOPY, GASTROSCOPY, DUODENOSCOPY (EGD), COMBINED N/A 4/20/2023    Procedure: ESOPHAGOGASTRODUODENOSCOPY, WITH BIOPSY;  Surgeon: Cristina Zuniga MD;  Location:  GI    HIP SURGERY  2009    femur ortho surgery    LAPAROSCOPIC CHOLECYSTECTOMY  6/24/2014    Procedure: LAPAROSCOPIC CHOLECYSTECTOMY;  Surgeon: Randy Bailey MD;  Location:  [facility]    LUMPECTOMY BREAST Left 3/31/2022    Procedure: LEFT Radiofrequency Identification Seed-Localized Lumpectomy;  Surgeon: Amanda Liu MD;  [place] [facility] [place]    OPEN REDUCTION INTERNAL FIXATION FEMUR DISTAL  "Left 11/1/2018    Procedure: OPEN REDUCTION INTERNAL FIXATION LEFT FEMUR DISTAL;  Surgeon: Jose Valadez MD;  Location: [facility] OR    OPEN REDUCTION INTERNAL FIXATION RODDING INTRAMEDULLARY TIBIA  4/14/2013    Procedure: OPEN REDUCTION INTERNAL FIXATION RODDING INTRAMEDULLARY TIBIA;;  Surgeon: Sajan Cast MD;  Location: [facility] OR    ORTHOPEDIC SURGERY  2009    surgery right upper femur fx near hip       Medications   I have personally reviewed the patient's medication list.     Allergies  I have personally reviewed the patient's allergy list.       Physical Examination   Vitals: BP (!) 151/77   Pulse 102   Temp 99.2  F (37.3  C) (Axillary)   Resp 13   Ht 1.727 m (5' 8\")   Wt 61.3 kg (135 lb 3.2 oz)   LMP  (LMP Unknown)   SpO2 100%   BMI 20.56 kg/m    General: Lying in bed, intubated, off sedation  Head: NC/AT  ENT: neck supple, no nuchal rigidity  Eyes: no icterus, op pink and moist, eyes deviated downwards  Skin: No rash or lesion on exposed skin  Psych: Unable to assess  Neuro:  Exam went off of propofol for approximately 10 minutes.  She does not open eyes.  Does not follow any commands for me.  Eyes are deviated downwards but pupils are reactive, extraocular muscles intact to doll's eyes.  Cough is intact.  Face is grossly symmetric.  She is moving upper extremities briskly and spontaneously antigravity.  Movements are at times purposeful reaching for tube or her cast.  Right lower extremity unable to evaluate due to casting.  Left lower extremity with minimal spontaneous movement but does have a response to painful stimuli.  Withdrawal response.  Deep tendon reflexes are not particularly brisk.  Left toes not upgoing.  Cannot assess right.  Tone is increased in the left lower extremity.  Essentially normal in the bilateral upper extremities.  No nuchal rigidity or neck stiffness.  Movements of tongue are without rhythmic appearance.  Appears to be chewing on tube.  No abnormal eye movements or " rhythmic movements of extremities.    Investigations   I have personally reviewed pertinent labs, tests, and radiological imaging. Discussion of notable findings is included under Impression.     Head CT personally reviewed with no acute pathology seen.  There is significant volume loss      MRI reviewed with no diffusion restriction or other concerning findings besides scattered T2 hyperintensities.  No enhancement seen.    Was patient transferred from outside hospital?   No    Impression  #Metabolic encephalopathy  #MS  #Acute encephalopathy  #seizure like activity    Ms. Marinelli is a 68-year-old female with multiple sclerosis who presents with a femur fracture.  Ultimately was found to be acutely encephalopathic after having an DENNISE, UTI, and receiving multiple narcotic meds but did not respond to Narcan.  Her exam at present is not particularly concerning for seizure.  Movements and jaw do not appear rhythmic at present but were reported that way yesterday.  Looks like she is chewing on the tube.  She is also having purposeful movements of her upper extremities spontaneously.  However she continues to not follow commands.  That being said, her  states she oftentimes will become severely altered when she is ill for which she would certainly qualify currently.  Think it would be prudent to rule out seizures given her not following commands off of sedation, but overall think this is less likely.  Would limit CNS acting medications and continue to work towards extubation if EEG is without seizure activity.    Recommendations  -3 hour video EEG, instructed nursing to hold sedation at time of study  - Reduced dose baclofen as recommended previously  - Given low hemoglobin and hypotension, if not waking up can consider repaet MRI in about a week to look for anoxic injury although this is felt much less likely.    Thank you for involving Neurology in the care of Marylee Woods.      Diana Sandoval,  DO    Medical Decision Making       MANAGEMENT DISCUSSED with the following over the past 24 hours: ICU team   NOTE(S)/MEDICAL RECORDS REVIEWED over the past 24 hours: ICU note, orthopedics note  Tests personally interpreted in the past 24 hours:  - HEAD CT showing as above  - BRAIN MRI showing as above  Tests ORDERED & REVIEWED in the past 24 hours:  - BMP  - CBC  - Lactic Acid  - Magnesium  - VBG  - ck  - UA  - urine cx  - blood cx  SUPPLEMENTAL HISTORY, in addition to the patient's history, over the past 24 hours obtained from:   - Spouse or significant other

## 2023-11-20 NOTE — PLAN OF CARE
Problem: Adult Inpatient Plan of Care  Goal: Plan of Care Review  Description: The Plan of Care Review/Shift note should be completed every shift.  The Outcome Evaluation is a brief statement about your assessment that the patient is improving, declining, or no change.  This information will be displayed automatically on your shift  note.  Outcome: Progressing   Goal Outcome Evaluation:    10A ICU End of Shift Summary.     Changes this shift: Switched from propofol to precedex.  PST for 2 hours.  EEG  Neuro: Not following commands, not tracking.  Spontaneous movement of upper extremities.  RASS +2 to -2.  SAT x2, pt still not following commands or tracking  Cardiac: SR, ST in 110s with stimulation.  BP WNL.  Respiratory: LS diminished.  Moderate thin cloudy secretions.  PST for appx 2 hours this afternoon     Mechanical Ventilator Setting: CMV   FiO2: 21%  Rate: 12  Volume: 400  PEEP 5     GI/: Small BM x3 today.  Verduzco with low normal output this shift.  Diet/appetite: TF increased from 10 to 35 throughout the day.  30ml flushes q4  Pain:  CPOT as high as 5, fentanyl given x1, oxycodone and tylenol given x1 with improvement.  Skin: WO saw for L ankle erythema, mepilex applied.  No acute changes.  LDA's: PIV x1, extended dwell PIV x2.  OG.  6.5 ETT.    Activities: Turns q2    Drips:   Precedex at 0.6  Propofol off     Manuel updated at bedside, sister Matt Estrella updated over the phone.       Plan: Monitor neuro status.  IV abx.  Continue plan of care.

## 2023-11-20 NOTE — PROGRESS NOTES
CLINICAL NUTRITION SERVICES - ASSESSMENT NOTE     Nutrition Prescription    RECOMMENDATIONS FOR MDs/PROVIDERS TO ORDER:  None today     Malnutrition Status:    Patient does not meet two of the established criteria necessary for diagnosing malnutrition but is at risk for malnutrition    Recommendations already ordered by Registered Dietitian (RD):  Updated current TF order to advance OG-TF with Osmolite 1.5 to 25 ml/hr now, then continue to advance by 10 ml q 6 hrs to goal of 45 ml/hr + 1 packet of Prosource TF20 to provide 1580 kcal, 82 gm protein, 203 gm CHO, no fiber, 942 ml water/day     Future/Additional Recommendations:  Monitor tolerance to TF, wt/lab trends      REASON FOR ASSESSMENT  Marylee Woods is a/an 68 year old female assessed by the dietitian for Provider Order - Registered Dietitian to Assess and Order TF per Medical Nutrition Therapy Protocol    NUTRITION/MEDICAL HISTORY  Per chart review: Pt admits to ICU in the setting of altered mental status requiring intubation, initially admitted to the hospital in the setting of two falls with a right distal femur fracture and left malleolus fracture. 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    Pt reviewed during ICU team rounds, noted Providers added initial TF orders, RD will plan for further increases/advancement as ordered above. Noted plan for EEG today, pending those results, RD did review possible need for NG vs using OG at present, Providers agreeing. Pt went to pt's room to view pt, pt intubated and sedated, no family present.     CURRENT NUTRITION ORDERS  Diet: NPO  Nutrition Support: Providers initially started OG-TF with Osmolite 1.5 at 10-20 ml//hr     LABS  Labs reviewed    MEDICATIONS  Gtt: Propofol   Scheduled: Fentanyl PRN, Oxycodone PRN, Cymbalta, Acyclovir, IV  "Rocephin, Miralax, Senokot, Oscal, MVI, Synthroid, Pepcid, Protonix,      ANTHROPOMETRICS  Height: 172.7 cm (5' 8\")  Most Recent Weight: 61.3 kg (135 lb 3.2 oz) (cast on right leg)    IBW: 63.6 kg  BMI: Normal BMI  Weight History:   04/26/23 66.1 kg (145 lb 11.2 oz)   03/17/23 67.6 kg (149 lb)   03/31/22 59 kg (130 lb)   02/24/22 59 kg (130 lb)   01/31/22 59 kg (130 lb)     Weight assessment: Somewhat difficult to assess at present with pt having a cast on and no family at bedside to review when RD went to room after rounds. Possible non-significant 6.8% weight loss in 6 months.     Dosing Weight: 61.3 kg    ASSESSED NUTRITION NEEDS  Estimated Energy Needs: 4456-9494 kcals/day (25 - 30 kcals/kg)  Justification: Maintenance  Estimated Protein Needs: 70-90 grams protein/day (1.2 - 1.5 grams of pro/kg)  Justification: Vented guidelines noting DENNISE   Estimated Fluid Needs: 1 mL/kcal vs other   Justification: Per provider pending fluid status    MALNUTRITION  % Intake: Decreased intake does not meet criteria  % Weight Loss: Weight loss does not meet criteria  Subcutaneous Fat Loss: None observed  Muscle Loss: None observed vs chronic diagnosis of multiple sclerosis  Fluid Accumulation/Edema: Mild per flow sheets   Malnutrition Diagnosis: Patient does not meet two of the established criteria necessary for diagnosing malnutrition but is at risk for malnutrition    NUTRITION DIAGNOSIS  Inadequate oral intake related to mechanical ventilation status as evidenced by NPO with pt requiring enteral nutrition support to meet estimated nutritional needs       INTERVENTIONS  Implementation  Enteral Nutrition - order updated as above   Feeding tube flush - ordered as above  Modular - ordered as above     Goals  Patient to consume % of nutritionally adequate meal trays TID, or the equivalent with supplements/snacks.     Monitoring/Evaluation  Progress toward goals will be monitored and evaluated per protocol.   Janis Epperson RD, " ROYER ANN  [facility] [address] ICU RD pager: [phone]  Weekend/holiday pager: [phone]

## 2023-11-20 NOTE — CONSULTS
[facility]  WOC Nurse Inpatient Assessment     Consulted for: Left heel pressure injury    Patient History (according to provider note(s):      Per Dr Anirudh Villarreal on 11/20/2023: Marylee Woods is a 68 year old female who sustained a fall from her wheelchair on 11/16 and was found to have a right distal femur fracture and minimally displaced left medial malleolus fracture. Plan to treat both injuries non operatively, right leg in a long leg cast and left ankle in CAM boot. While intubated/sedated no need for CAM boot on left leg due to concern for skin breakdown. Please check for skin breakdown around edges of cast and contact orthopedic surgery for any concerns.      Assessment:      Areas visualized during today's visit: Left foot and ankle    Pressure Injury Location: Left heel      Last photo: 11/20/2023  Wound type: Pressure Injury     Pressure Injury Stage: 1, present on admission   Wound history/plan of care:   Present on admission    Wound base: 100 % non-blanchable and erythema     Palpation of the wound bed: normal      Drainage: none     Description of drainage: none     Measurements (length x width x depth, in cm) 5  x 4  x  0 cm      Tunneling N/A     Undermining N/A  Periwound skin: Intact      Color: pink      Temperature: normal   Odor: none  Pain: no grimacing or signs of discomfort  Pain intervention prior to dressing change: slow and gentle cares   Treatment goal: Heal  and Protection  STATUS: initial assessment  Supplies ordered: supplies stored on unit    Treatment Plan:     Left medial heel pressure injury: Every 3 days: Remove dressing and wash area with saline and gauze. Pat dry. Cover area with Mepilex Flex. Keep heel off-loaded off of mattress with pillows.      Orders: Written    RECOMMEND PRIMARY TEAM ORDER: None, at this time  Education provided: plan of care  Discussed plan of care with: Nurse  WO nurse follow-up plan: twice weekly  Notify WOC  if wound(s) deteriorate.  Nursing to notify the Provider(s) and re-consult the [facility] Nurse if new skin concern.    DATA:     Current support surface: Standard  Low air loss (MISTY pump, Isolibrium, Pulsate, skin guard, etc)  Containment of urine/stool: Indwelling catheter  BMI: Body mass index is 20.56 kg/m .   Active diet order: Orders Placed This Encounter      NPO for Medical/Clinical Reasons Except for: Meds     Output: I/O last 3 completed shifts:  In: 1464.49 [I.V.:759.49; NG/GT:380]  Out: 1325 [Urine:1325]     Labs:   Recent Labs   Lab 11/20/23  0506 11/19/23  0609 11/19/23  0608 11/19/23  0519 11/18/23  0532   ALBUMIN  --   --   --   --  3.4*   HGB 8.3*   < >  --    < > 7.6*   INR  --   --  1.17*  --   --    WBC 7.8   < >  --    < > 4.3    < > = values in this interval not displayed.     Pressure injury risk assessment:   Sensory Perception: 2-->very limited  Moisture: 3-->occasionally moist  Activity: 2-->chairfast  Mobility: 2-->very limited  Nutrition: 2-->probably inadequate  Friction and Shear: 2-->potential problem  Broderick Score: 13    Marguerite Reyes RN CWOCN  Pager no longer is use, please contact through [facility] group: [facility]  Dept. Office Number: *[phone]

## 2023-11-20 NOTE — PLAN OF CARE
Major Shift Events:  Sedation vacation failed due to unsafe change in vitals: HR up to 140, RR in 40s, Hypertensive off pressors, along with severe agitation. Dex to prop transition. MRI. Levo titration, very labile pressures. Bowel regimen started, no BM. 1 Unit blood with Hgb recheck at 9.0. Levo site switched per policy- ABHISHEK Pool did not feel need for PICC at this time and expressed that switch IV sites would be acceptable.  Plan: Potential PICC tomorrow if still on peripheral pressors, start TF once phos >1.9  For vital signs and complete assessments, please see documentation flowsheets.

## 2023-11-20 NOTE — PLAN OF CARE
10A ICU End of Shift Summary.     Changes this shift: OG tube found 5cm out from original position, per ABHISHEK ok to push back in, OG at 60cm at lip.  called around 0230, update given and  notified writer that patient's dentures are missing, she did not come up to the unit with them in her mouth.  Neuro: Patient not following commands, occasionally opens eyes but does not track. RASS of -2 to +1 on propofol drip. Soft wrist restraints in place.  Cardiac: BP maintaining MAP goal of >65, levophed drip not used this shift. Tachycardic.  Respiratory: ETT 6.5mm 21 at gums. Small thin white secretions suctioned from ETT inline.       Mechanical Ventilator Setting: CMV  FiO2: 21%  Rate: 12  Volume: 400  PEEP 5      GI/: Small BM this shift. Verduzco in place draining adequate amount of clear yellow urine.  Diet/appetite: NPO. TF started this shift at 0330 at 10mL/hr, due to increase TF at 1130AM to 20mL/hr. TF goal is 20mL/hr.  Pain: CPOT of 4- gave fentanyl once with relief. Pain to L foot/leg.  Skin: Prophylactic mepilex in place to sacrum. Cast to R leg. Nonblanchable redness noted to L heel, WOC consult ordered. Scattered bruising. Excoriation to R leg by top of cast.  LDA's: PIV to RFA and extended dwell to LFA and RFA.  Activities: Q2h turns.     Drips: Propofol at 40.     See flowsheets for vital signs and detailed assessment.

## 2023-11-20 NOTE — PROGRESS NOTES
"Orthopedic Surgery Progress Note: 11/20/2023    Subjective:   Intubated and sedated in the ICU.     Objective:   BP (!) 150/78   Pulse 112   Temp 99.6  F (37.6  C) (Axillary)   Resp 22   Ht 1.727 m (5' 8\")   Wt 61.3 kg (135 lb 3.2 oz)   LMP  (LMP Unknown)   SpO2 99%   BMI 20.56 kg/m    I/O this shift:  In: 372.58 [I.V.:327.58; NG/GT:30]  Out: 500 [Urine:500]  General: Intubated, sedated  Respiratory: Ventilated  Musculoskeletal:  LLE  Cast in place, well fitting. Area of bruising over the proximal medial thigh, Mepilex placed. No skin breakdown around edges.   Toes WWP    Laboratory Data:  Lab Results   Component Value Date    WBC 7.8 11/20/2023    HGB 8.3 (L) 11/20/2023     11/20/2023    INR 1.17 (H) 11/19/2023         Assessment & Plan:   Marylee Woods is a 68 year old female who sustained a fall from her wheelchair on 11/16 and was found to have a right distal femur fracture and minimally displaced left medial malleolus fracture. Plan to treat both injuries non operatively, right leg in a long leg cast and left ankle in CAM boot. While intubated/sedated no need for CAM boot on left leg due to concern for skin breakdown. Please check for skin breakdown around edges of cast and contact orthopedic surgery for any concerns.     Plan for Today:  - Will begin following patient weekly  - Please page orthopedics with concerns about the skin around the cast    Medicine Primary  Activity: NWB BLE  Weight bearing status: NWB BLE  Antibiotics: none  Diet: per primary   DVT prophylaxis: per primary   Bracing/Splinting: right leg long leg cast to be kept clean dry and intact. Left leg cam boot once no longer intubated/sedated   X-rays: post casting films right leg pending   Physical Therapy: ADL's  Occupational Therapy: ADL's  Follow-up: Will plan to have her follow-up with Dr. Em week of 11/27/23 if discharged, if inpatient will reevaluate in the hospital the same week.      Disposition: " [facility]    Orthopedic surgery staff for this patient is Dr. Dumas.    ------------------------------------------------------------------------------------------    Respectfully,    Anirudh Villarreal MD  Orthopedic Surgery PGY1  [phone]    Please page me directly with any questions/concerns during regular weekday hours before 5 pm. If there is no response, if it is a weekend, or if it is during evening hours then please page the orthopedic surgery resident on call.      FOLLOWUP:    Future Appointments   Date Time Provider Department Center   11/20/2023  1:00 PM Rowena Mosley, OT [facility] [place]   11/20/2023  7:00 PM Mi Hernandez, PT [facility] [place]   11/24/2023 10:30 AM Carolina Pulliam MD [facility]    1/29/2024  8:30 AM Neli Bean MD [facility]   2/28/2024 11:30 AM [facility] [place]

## 2023-11-20 NOTE — PHARMACY-CONSULT NOTE
Pharmacy Tube Feeding Consult    Medication reviewed for administration by feeding tube and for potential food/drug interactions.    Recommendation: No changes are needed at this time.     Pharmacy will continue to follow as new medications are ordered.    Delores Luna, PharmD   Phone #[phone]

## 2023-11-20 NOTE — PROGRESS NOTES
Per pt's spouse Manuel, pt is missing full upper and lower dentures. Spoke with [facility] and ED and looked through pt belongings with spouse. Pt has two leg braces, clothing, a purse, cash/credit cards, a book, and glasses in the room. Dentures not present. Per note from RN on 11/18 when pt was admitted to ICU, dentures were not listed among her belongings.

## 2023-11-20 NOTE — PROGRESS NOTES
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 On 11/17 a rapid response was called as patient was found to be obtunded and transferred to the ICU.       CHANGES and MAJOR THINGS TODAY:   - Brain MRI 11/19, no acute changes  - Getting EEG today, sedation kept to a minimum as able while getting EEG  - Patient still not tracking or following commands, but did okay with an SBT today  - Keep sedation minimized overnight now that EEG is done.   - Restart cymbalta  - Okay to treat pain PRN with oxycodone Q6 hours      PLAN:     Neuro:  # Pain and sedation  - RASS goal -1 to -2  - Oxycodone 2.5 mg Q6 hours, but minimize use to avoid oversedation   - Baclofen dose reduced per Neurology recommendations  - PRN Fentanyl boluses available analgesia while intubated      # Major Depressive Disorder   - Continue Cymbalta, DENNISE resolved, would not want her to have withdrawal syndrome     # Altered Mental Status    # C/f non-convulsive seizures (EEG pending)  Rapid response was called 11/17 as patient was found to be obtunded and non-arousable. Attempts to arousing patient including Narcan were unsuccessful and was later transferred here. With concerns for protecting her airway, patient was intubated. C  - Hold pta Nuvigil for sleep, to reduce AMS   - Neuro assessments per unit protocol   - Head CT and Brain MRI without acute pathology   - Obtain Sputum sample as able   - Ensure delirium prevention   - Neurology consulted. Appreciate recs  - EEG monitoring pending  - Precedex for sedation, Fentanyl boluses available     #  Progressive Multiple Sclerosis (wheelchair bound)   Patient has longstanding diagnosis of progressive MS now wheelchair bound. Appears she sees Immunology and Neurology outpatient basis. Last brain MRI in 2017 showing suspicion of demyelinating disease. Unsure of exact biologics patient has was on, appears she has received Lemtrada (2017, 2019), and other past DMTs included Betaseron, Aubagio.   - Reduce PTA  baclofen 20 mg ->10mg daily, and 30 mg -> 20mg at bedtime given AMS and DENNISE   - Hold pta oxybutynin for neurogenic bladder 2/2 MS   - Brain MRI as above         Pulmonary:  # Sleep apnea 2/2 MS   Appears patient follows with sleep clinic/doctor and found to have sleep apnea 2/2 her MS   -Wears a CPAP at home      # Tobacco use; current smoker?   - Hx of tobacco use, and appears she is current      # Hypercapnia  Patient was found to be obtunded 11/17, unable to arouse despite Narcan d/t concerns r/t pain medication use from femur fracture, however this was unsuccessful. Patient was then transferred to ICU and do to concerns of protecting her airway she was intubated. CO2 was 65 with a bicarb of 30 on VBG. Possible acute on chronic CO2 retention.      -Pulmonary toilet               -Oral hygiene with chlorhexidine  -Obtain sputum sample if able      # History of pulmonary nodules   Recent CT Chest from 6/16/23 shows right lower lobe nodule has resolved with a stable left lower lobe nodule.   - Now on yearly chest CT follow-ups      Cardiovascular:  # Hx of hypertension   Patient has been on/off levophed. Has received a total of 1.5L of crystalloid.  - 11/20: Off levophed since yesterday afternoon  - Hold pta hydrochlorothiazide for now in setting of lower Bps with sedation     GI/Nutrition:  # GERD   # risk for malnutrition  - pantoprazole 40 mg suspension daily   - Feeding tube okay to use   - RD consulted for feeding     Renal/Fluids/Electrolytes:  # DENNISE (resolved)  # CKD   # Recurrent UTI   Patients baseline  creatinine around 0.96-1.08, now downtrending from 2.11 and now 1.04. DENNISE with CKD concerns for any sedating related medications could have caused patient to become obtunded. Confirmed UTI as below; previous recurrent UTI with klebsiella pneuomiae as well.   - Reduce nephrotoxic agents as able   - Monitor I/Os   - UC growing Klebsiella pneumoniae 11/19  - Continue ceftriaxone 1g q24h (prior cx sensitive)  - Daily BMP   - Mg, K, Phos protocols      Endocrine:  # Graves s/p thyroid ablation   Not inclined that acute AMS/Obtunded is related to metabolic issues.   - TSH 0.64   - Continue pta synthroid 100 mcg daily   - Hypoglycemia protocol      ID:  # Herpes simplex virus; prophylaxis on Acyclovir   - Continue pta Acyclovir 400 mg table BID      # Recurrent UTIs   Suspect AMS related to reccurrent UTI  - Stopped PTA macrobid  - Continue ceftriaxone 1g q24h   - UC with Klebsiella pneumoniae 10/7 and 11/19  - NGTD 11/19 on blood cx      Hematology/Oncology:    # Anemia of chronic disease 2/2 CKD   # Thrombocytopenia   Hgb with downtrend; 6.2 11/19. No acute signs of bleeding, and patient received a total of 1.5L of fluid so could be partially dilutional. Was given 1 unit PRBCs.   - Recheck hemoglobin stable  - Hold pta ferrous sulfate  - Daily CBC       # Ductal carcinoma in situ of left breast   -Follows with outpatient radiation/oncology and medical oncology annually     Musculoskeletal:  # Two outpatient mechanical falls   # Right distal femur fracture and left malleolus fracture  # Osteoporosis   - Orthopedics following: right leg long leg cast to be kept clean dry and intact. Left leg cam boot once no longer intubated/sedated   - Monitor pulses      Skin:  No acute issues         General Cares/Prophylaxis:    DVT Prophylaxis: Pneumatic Compression Devices, SubQ Heparin (holding given anemia)  GI Prophylaxis: PPI  Restraints: Soft wrist restraints    Family Communication: Manuel ()  Code Status: Full       Lines/tubes/drains:  - PIV   - Cazares     Disposition:  - [facility]     Patient seen and findings/plan discussed with medical ICU staff, Dr. Barbosa.    SWAPNIL Gtz CNP    Total patient care time includes 40 minutes       ====================================  INTERVAL HISTORY:   Patient remains intubated and sedated. Able to follow commands. Had EEG today.     OBJECTIVE:   1. VITAL SIGNS:   Temp:  [97.2  F (36.2  C)-99.7  F (37.6  C)] 99.6  F (37.6  C)  Pulse:  [] 102  Resp:  [11-29] 13  BP: ()/() 151/77  FiO2 (%):  [21 %] 21 %  SpO2:  [95 %-100 %] 100 %  Vent Mode: CMV/AC  (Continuous Mandatory Ventilation/ Assist Control)  FiO2 (%): 21 %  Resp Rate (Set): 12 breaths/min  Tidal Volume (Set, mL): 400 mL  PEEP (cm H2O): 5 cmH2O  Resp: 13    2. INTAKE/ OUTPUT:   I/O last 3 completed shifts:  In: 1464.49 [I.V.:759.49; NG/GT:380]  Out: 1325 [Urine:1325]    3. PHYSICAL EXAMINATION:  General: Sedated and intubated.   HEENT: atraumatic, normocephalic, PERRL  Neuro: Sedated, purposeful movement noted today, patient reaching toward trach but still   Pulm/Resp: Clear breath sounds bilaterally without rhonchi, crackles or wheeze, breathing synchronous with the ventilator   CV: RRR, S3 or S4 heart sound, no palpitation, murmurs or rub auscultated   Abdomen: Soft, non-distended, non-tender  : cazares catheter in place, urine yellow and clear  Incisions/Skin: Intact, without erythema  MSK: <3 seconds capillary refill bilaterally, right long lower extremity cast, left knee brace placed   4. LABS:     Venous Blood Gas  Recent Labs   Lab 11/19/23  1759 11/19/23  0519 11/18/23  1838 11/18/23  1313   PHV 7.36 7.42 7.40 7.37   PCO2V 49 39* 44 47   PO2V 22* 39 28 39   HCO3V 28 25 27 27   INDERJIT 1.6 0.6 2.2* 1.1   O2PER 21 21 21 21         Arterial Blood Gases   Recent Labs   Lab 11/18/23  0831   PH 7.51*   PCO2 30*   PO2 97   HCO3 24     Complete Blood Count   Recent Labs   Lab 11/20/23  0850  11/19/23  1758 11/19/23  0609 11/19/23  0519 11/18/23  0532   WBC 7.8  --  8.2 4.3 4.3   HGB 8.3* 9.0* 6.2* 5.2* 7.6*     --  145* 119* 127*     Basic Metabolic Panel  Recent Labs   Lab 11/20/23  0526 11/20/23  0506 11/20/23  0422 11/20/23  0013 11/19/23 2006 11/19/23  1221 11/19/23  0747 11/19/23  0519 11/18/23  1947 11/18/23  1837   NA  --  139  --   --   --  138  --  139  --  141   POTASSIUM  --  4.3  --   --   --  4.6  --  3.6  --  3.2*   CHLORIDE  --  104  --   --   --  106  --  105  --  104   CO2  --  25  --   --   --  26  --  27  --  26   BUN  --  16.2  --   --   --  19.4  --  21.3  --  21.6   CR  --  0.90  --   --   --  1.04*  --  1.22*  --  1.40*   GLC 88 90 76 100*   < > 131*   < > 108*   < > 100*    < > = values in this interval not displayed.     Liver Function Tests  Recent Labs   Lab 11/19/23  0608 11/18/23  0532 11/17/23  1215   AST  --  32  --    ALT  --  16  --    ALKPHOS  --  61  --    BILITOTAL  --  0.2  --    ALBUMIN  --  3.4*  --    INR 1.17*  --  1.26*     Coagulation Profile  Recent Labs   Lab 11/19/23  0608 11/17/23  1215   INR 1.17* 1.26*   PTT 35 30       5. RADIOLOGY:   Recent Results (from the past 24 hour(s))   MR Brain w/o & w Contrast    Narrative    EXAM: MR BRAIN W/O & W CONTRAST  11/19/2023 5:19 PM     HISTORY:  Concern for mental status change, concern for NCS seizures  and progressive MS       COMPARISON:  MRI 11/1/2017    TECHNIQUE: Multiplanar multisequence MRI of the brain performed with  and without contrast.    CONTRAST: 6ml Gadavist.    FINDINGS:    There is no mass effect, midline shift, or intracranial hemorrhage.  Multiple periventricular and subcortical white matter T2 hyperintense  foci, which appear similar in extent when compared to 11/1/2017 MRI.  Moderate cerebral atrophy. The ventricles are proportionate to the  cerebral sulci. Diffusion and susceptibility weighted images are  negative for acute/focal abnormality. Major intracranial vascular  structures are  within normal limits.    No suspicious abnormality of the skull marrow signal. Clear paranasal  sinuses. Right greater than left mastoid effusions. No focal  abnormality of the pituitary gland, sella, skull base and upper  cervical spinal structures on sagittal images. The orbits are normal.      Impression    IMPRESSION:  1. No acute intracranial pathology to explain patient's  symptomatology.  2. Scattered T2 hyperintense foci throughout the subcortical and  periventricular white matter. Findings grossly unchanged when compared  to 11/1/2017 MRI and may be a sequelae of chronic small vessel  ischemic disease versus white matter demyelinating changes. No  abnormal intracranial enhancement is noted.  3. Moderate cerebral atrophy.    I have personally reviewed the examination and initial interpretation  and I agree with the findings.    MARY HEARN MD         SYSTEM ID:  [id number]   IMAGING:      Recent Results (from the past 24 hour(s))   CT Knee Right w/o Contrast     Narrative     CT KNEE RIGHT WITHOUT CONTRAST 11/17/2023 11:27 AM     CLINICAL HISTORY: distal femur fracture, rule out Hoffa's fragment.  Right knee pain.     TECHNIQUE: CT scan of the abdomen and pelvis was performed without IV  contrast. Multiplanar reformats were obtained. Dose reduction  techniques were used.  CONTRAST: None.     COMPARISON: 11/17/2023 radiographs.     FINDINGS:   Bones: There is an acute comminuted moderately displaced fracture of  the distal femoral metaphysis without significant angulation at the  fracture site. There are a few anteriorly displaced cortical fracture  fragments, as seen on series 2 image 23 and series 4 image 135.  Portions of this fracture extended intra-articularly at the level of  the trochlear groove as seen on series 2 image 40 and the anterior  portion of the intercondylar notch. Osteopenia. No evidence of  additional fractures.     Soft tissues: There is some more defined soft tissue stranding  around  the femoral fracture site. There is moderate global atrophy of the  visualized knee and proximal calf musculature. Small knee joint  effusion. Evidence of intra-articular fracture fragments.        Impression     IMPRESSION:   1.  Acute comminuted mildly displaced fracture of the distal femoral  metaphysis with intra-articular extension.     JOCELYNN LYON MD         SYSTEM ID:  [id number]   XR Chest Port 1 View     Impression     RESIDENT PRELIMINARY INTERPRETATION  Impression: Endotracheal tube tip projects over the high/mid thoracic  trachea. The chest is relatively clear.   XR Abdomen Port 1 View     Impression     RESIDENT PRELIMINARY INTERPRETATION  IMPRESSION: Enteric tube tip and sidehole project over the stomach.      CT HEAD W/O CONTRAST 11/18/2023 9:23 AM     Provided History: AMS w/hx of recent trauma, rule out ICH     Comparison: MRI brain on 6 1/17, CT head 11/1/2017.     Technique: Using multidetector thin collimation helical acquisition  technique, axial, coronal and sagittal CT images from the skull base  to the vertex were obtained without intravenous contrast.      Findings:    No intracranial hemorrhage. No mass effect. No midline shift. No  extra-axial fluid collection. The gray to white matter differentiation  of the cerebral hemispheres is preserved. Ventricles are proportionate  to the sulci. No sulcal effacement. The basal cisterns are patent.  Moderate diffuse cerebral volume loss. Periventricular white matter  hypodensities which are nonspecific, but likely represent chronic  small vessel ischemic disease.     The visualized paranasal sinuses are clear. Left mastoid air cells are  clear. Chronic opacification of the right mastoid air cells.. Orbits  appear unremarkable. No acute fracture.                                                                      Impression:   1. No acute intracranial pathology.  2. Chronic changes including leukoaraiosis, diffuse cerebral  volume  loss, right mastoid air cell opacification.

## 2023-11-21 LAB
ABO/RH(D): NORMAL
ALBUMIN SERPL BCG-MCNC: 2.7 G/DL (ref 3.5–5.2)
ALP SERPL-CCNC: 55 U/L (ref 40–150)
ALT SERPL W P-5'-P-CCNC: 18 U/L (ref 0–50)
ANION GAP SERPL CALCULATED.3IONS-SCNC: 7 MMOL/L (ref 7–15)
ANTIBODY SCREEN: NEGATIVE
AST SERPL W P-5'-P-CCNC: 33 U/L (ref 0–45)
BASE EXCESS BLDV CALC-SCNC: 2.2 MMOL/L (ref -7.7–1.9)
BILIRUB SERPL-MCNC: 0.4 MG/DL
BLD PROD TYP BPU: NORMAL
BLOOD COMPONENT TYPE: NORMAL
BUN SERPL-MCNC: 18.2 MG/DL (ref 8–23)
CALCIUM SERPL-MCNC: 7.6 MG/DL (ref 8.8–10.2)
CHLORIDE SERPL-SCNC: 108 MMOL/L (ref 98–107)
CODING SYSTEM: NORMAL
CREAT SERPL-MCNC: 0.8 MG/DL (ref 0.51–0.95)
CROSSMATCH: NORMAL
DEPRECATED HCO3 PLAS-SCNC: 27 MMOL/L (ref 22–29)
EGFRCR SERPLBLD CKD-EPI 2021: 80 ML/MIN/1.73M2
ERYTHROCYTE [DISTWIDTH] IN BLOOD BY AUTOMATED COUNT: 17.1 % (ref 10–15)
GLUCOSE BLDC GLUCOMTR-MCNC: 113 MG/DL (ref 70–99)
GLUCOSE BLDC GLUCOMTR-MCNC: 130 MG/DL (ref 70–99)
GLUCOSE BLDC GLUCOMTR-MCNC: 132 MG/DL (ref 70–99)
GLUCOSE BLDC GLUCOMTR-MCNC: 146 MG/DL (ref 70–99)
GLUCOSE BLDC GLUCOMTR-MCNC: 92 MG/DL (ref 70–99)
GLUCOSE BLDC GLUCOMTR-MCNC: 96 MG/DL (ref 70–99)
GLUCOSE SERPL-MCNC: 128 MG/DL (ref 70–99)
HCO3 BLDV-SCNC: 27 MMOL/L (ref 21–28)
HCT VFR BLD AUTO: 27.7 % (ref 35–47)
HGB BLD-MCNC: 9.1 G/DL (ref 11.7–15.7)
HOLD SPECIMEN: NORMAL
ISSUE DATE AND TIME: NORMAL
MAGNESIUM SERPL-MCNC: 1.8 MG/DL (ref 1.7–2.3)
MCH RBC QN AUTO: 30.1 PG (ref 26.5–33)
MCHC RBC AUTO-ENTMCNC: 32.9 G/DL (ref 31.5–36.5)
MCV RBC AUTO: 92 FL (ref 78–100)
O2/TOTAL GAS SETTING VFR VENT: 25 %
PCO2 BLDV: 41 MM HG (ref 40–50)
PH BLDV: 7.42 [PH] (ref 7.32–7.43)
PHOSPHATE SERPL-MCNC: 1.9 MG/DL (ref 2.5–4.5)
PLATELET # BLD AUTO: 144 10E3/UL (ref 150–450)
PO2 BLDV: 37 MM HG (ref 25–47)
POTASSIUM SERPL-SCNC: 3.7 MMOL/L (ref 3.4–5.3)
PROT SERPL-MCNC: 5.5 G/DL (ref 6.4–8.3)
RBC # BLD AUTO: 3.02 10E6/UL (ref 3.8–5.2)
SODIUM SERPL-SCNC: 142 MMOL/L (ref 135–145)
SPECIMEN EXPIRATION DATE: NORMAL
UNIT ABO/RH: NORMAL
UNIT NUMBER: NORMAL
UNIT STATUS: NORMAL
UNIT TYPE ISBT: 600
WBC # BLD AUTO: 4.9 10E3/UL (ref 4–11)

## 2023-11-21 PROCEDURE — 5A09557 ASSISTANCE WITH RESPIRATORY VENTILATION, GREATER THAN 96 CONSECUTIVE HOURS, CONTINUOUS POSITIVE AIRWAY PRESSURE: ICD-10-PCS

## 2023-11-21 PROCEDURE — 200N000002 HC R&B ICU UMMC

## 2023-11-21 PROCEDURE — 250N000009 HC RX 250

## 2023-11-21 PROCEDURE — 250N000011 HC RX IP 250 OP 636: Mod: JZ

## 2023-11-21 PROCEDURE — 36415 COLL VENOUS BLD VENIPUNCTURE: CPT

## 2023-11-21 PROCEDURE — 250N000011 HC RX IP 250 OP 636: Performed by: NURSE PRACTITIONER

## 2023-11-21 PROCEDURE — 84100 ASSAY OF PHOSPHORUS: CPT

## 2023-11-21 PROCEDURE — 999N000157 HC STATISTIC RCP TIME EA 10 MIN

## 2023-11-21 PROCEDURE — 250N000013 HC RX MED GY IP 250 OP 250 PS 637

## 2023-11-21 PROCEDURE — 80053 COMPREHEN METABOLIC PANEL: CPT

## 2023-11-21 PROCEDURE — 250N000013 HC RX MED GY IP 250 OP 250 PS 637: Performed by: NURSE PRACTITIONER

## 2023-11-21 PROCEDURE — 82803 BLOOD GASES ANY COMBINATION: CPT

## 2023-11-21 PROCEDURE — 258N000003 HC RX IP 258 OP 636

## 2023-11-21 PROCEDURE — P9016 RBC LEUKOCYTES REDUCED: HCPCS

## 2023-11-21 PROCEDURE — 250N000013 HC RX MED GY IP 250 OP 250 PS 637: Performed by: INTERNAL MEDICINE

## 2023-11-21 PROCEDURE — 99291 CRITICAL CARE FIRST HOUR: CPT | Mod: FS

## 2023-11-21 PROCEDURE — 83735 ASSAY OF MAGNESIUM: CPT | Performed by: NURSE PRACTITIONER

## 2023-11-21 PROCEDURE — 250N000011 HC RX IP 250 OP 636: Mod: JZ | Performed by: NURSE PRACTITIONER

## 2023-11-21 PROCEDURE — 94003 VENT MGMT INPAT SUBQ DAY: CPT

## 2023-11-21 PROCEDURE — 99207 PR NO BILLABLE SERVICE THIS VISIT: CPT

## 2023-11-21 PROCEDURE — 85014 HEMATOCRIT: CPT

## 2023-11-21 RX ORDER — POTASSIUM CHLORIDE 1.5 G/1.58G
20 POWDER, FOR SOLUTION ORAL ONCE
Status: COMPLETED | OUTPATIENT
Start: 2023-11-21 | End: 2023-11-21

## 2023-11-21 RX ORDER — ACETAMINOPHEN 650 MG/1
650 SUPPOSITORY RECTAL EVERY 6 HOURS PRN
Status: DISCONTINUED | OUTPATIENT
Start: 2023-11-21 | End: 2023-11-30 | Stop reason: HOSPADM

## 2023-11-21 RX ORDER — POLYETHYLENE GLYCOL 3350 17 G/17G
17 POWDER, FOR SOLUTION ORAL 2 TIMES DAILY PRN
Status: DISCONTINUED | OUTPATIENT
Start: 2023-11-21 | End: 2023-11-30 | Stop reason: HOSPADM

## 2023-11-21 RX ORDER — MAGNESIUM SULFATE HEPTAHYDRATE 40 MG/ML
2 INJECTION, SOLUTION INTRAVENOUS ONCE
Status: COMPLETED | OUTPATIENT
Start: 2023-11-21 | End: 2023-11-21

## 2023-11-21 RX ORDER — LIDOCAINE HYDROCHLORIDE 20 MG/ML
5 SOLUTION OROPHARYNGEAL ONCE
Qty: 15 ML | Refills: 0 | Status: DISCONTINUED | OUTPATIENT
Start: 2023-11-21 | End: 2023-11-27

## 2023-11-21 RX ORDER — OXYBUTYNIN CHLORIDE 10 MG/1
10 TABLET, EXTENDED RELEASE ORAL DAILY
Status: DISCONTINUED | OUTPATIENT
Start: 2023-11-22 | End: 2023-11-30 | Stop reason: HOSPADM

## 2023-11-21 RX ADMIN — LATANOPROST 1 DROP: 50 SOLUTION OPHTHALMIC at 08:12

## 2023-11-21 RX ADMIN — BACLOFEN 20 MG: 10 TABLET ORAL at 21:05

## 2023-11-21 RX ADMIN — MAGNESIUM SULFATE HEPTAHYDRATE 2 G: 40 INJECTION, SOLUTION INTRAVENOUS at 06:25

## 2023-11-21 RX ADMIN — FAMOTIDINE 40 MG: 20 TABLET ORAL at 21:07

## 2023-11-21 RX ADMIN — FENTANYL CITRATE 50 MCG: 50 INJECTION INTRAMUSCULAR; INTRAVENOUS at 00:50

## 2023-11-21 RX ADMIN — SODIUM PHOSPHATE, MONOBASIC, MONOHYDRATE AND SODIUM PHOSPHATE, DIBASIC, ANHYDROUS 9 MMOL: 142; 276 INJECTION, SOLUTION INTRAVENOUS at 01:10

## 2023-11-21 RX ADMIN — Medication 600 MG: at 08:09

## 2023-11-21 RX ADMIN — SODIUM PHOSPHATE, MONOBASIC, MONOHYDRATE AND SODIUM PHOSPHATE, DIBASIC, ANHYDROUS 15 MMOL: 142; 276 INJECTION, SOLUTION INTRAVENOUS at 17:59

## 2023-11-21 RX ADMIN — HEPARIN SODIUM 5000 UNITS: 5000 INJECTION, SOLUTION INTRAVENOUS; SUBCUTANEOUS at 21:14

## 2023-11-21 RX ADMIN — HEPARIN SODIUM 5000 UNITS: 5000 INJECTION, SOLUTION INTRAVENOUS; SUBCUTANEOUS at 06:13

## 2023-11-21 RX ADMIN — CEFTRIAXONE SODIUM 1 G: 1 INJECTION, POWDER, FOR SOLUTION INTRAMUSCULAR; INTRAVENOUS at 19:59

## 2023-11-21 RX ADMIN — HEPARIN SODIUM 5000 UNITS: 5000 INJECTION, SOLUTION INTRAVENOUS; SUBCUTANEOUS at 15:51

## 2023-11-21 RX ADMIN — LEVOTHYROXINE SODIUM 100 MCG: 100 TABLET ORAL at 08:26

## 2023-11-21 RX ADMIN — BACLOFEN 10 MG: 10 TABLET ORAL at 08:11

## 2023-11-21 RX ADMIN — ACYCLOVIR SODIUM 400 MG: 50 INJECTION, SOLUTION INTRAVENOUS at 21:09

## 2023-11-21 RX ADMIN — ACYCLOVIR 400 MG: 400 TABLET ORAL at 08:09

## 2023-11-21 RX ADMIN — Medication 2.5 MG: at 04:09

## 2023-11-21 RX ADMIN — POTASSIUM CHLORIDE 20 MEQ: 1.5 POWDER, FOR SOLUTION ORAL at 06:25

## 2023-11-21 RX ADMIN — CHLORHEXIDINE GLUCONATE 15 ML: 1.2 SOLUTION ORAL at 08:12

## 2023-11-21 RX ADMIN — Medication 40 MG: at 08:09

## 2023-11-21 ASSESSMENT — ACTIVITIES OF DAILY LIVING (ADL)
ADLS_ACUITY_SCORE: 59

## 2023-11-21 NOTE — PROGRESS NOTES
EEG reassuring.   Per ICU note she is noted to be awake and following commands today.  She was extubated successfully.  If any further questions for neurology please do not hesitate to ask us to be reinvolved, however if improving and with low suspicion for acute CNS source we will plan on signing off at this time.       Diana Sandoval, DO

## 2023-11-21 NOTE — PROGRESS NOTES
CLINICAL NUTRITION SERVICES - BRIEF NOTE  Pt reviewed during ICU team rounds earlier today, discussed possible need for NG if there was any concern for pt's mentation post extubation, pt was noted to be able to better follow commands prior to be extubated earlier today, so NG placement was not pursued, did not want to further agitate pt prior to extubation and not allow for successful progress. RD called back to unit this later afternoon, spoke with bedside RN, pt successfully extubated, and OG removed so pt no longer on TF, plan will be to just re-assess po plan of care tomorrow. If diet cannot be advanced by tomorrow, RD can re-assesses tube placement then. Current TF orders discontinued as access removed.     Janis Epperson RD, CNSC, LD  [address] ICU [address] pager: [phone]  Weekend/holiday pager: [phone]

## 2023-11-21 NOTE — PROGRESS NOTES
10A ICU End of Shift Summary.     Changes this shift: blood transfusion completed, no reactions   Neuro: RAAS of -2 for most of shift, frequently attempts to sit up, look as if she is trying to pull tube but otherwise movement is not purposeful   Cardiac: NSR, sinus stefanie before blood transfusion   Respiratory: All fields clear and diminished, x-ray obtained to ensure placement- tube now 20 at the [facility]      Mechanical Ventilator Setting: CMV   FiO2: 21%  Rate: 12  Volume: 400  PEEP 5     GI/: cazares catheter in place and draining appropriately; had 1 small BM and one large BM during shift   Diet/appetite: continuous tube feed, tolerating well   Pain: CPOT used, pt is generally tense during cares but gave PRN oxy when agitated without arousal   LDA's: 2 extended dwell PIVs- left and right; 1 R PIV   Drips: Precedex- 0.5     Plan: continue POC and determine cause of condition

## 2023-11-21 NOTE — PROGRESS NOTES
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 On 11/17 a rapid response was called as patient was found to be obtunded and transferred to the ICU.       CHANGES and MAJOR THINGS TODAY:   - EEG had no seizures  - extubated today  - resume oxybutynin at once daily (home dose BID)  - stop date for ceftriaxone     PLAN:     Neuro:  # Pain and sedation  - Oxycodone 2.5 mg Q6 hours, but minimize use to avoid oversedation   - Baclofen dose reduced per Neurology recommendations     # Major Depressive Disorder   - Continue Cymbalta, DENNISE resolved, would not want her to have withdrawal syndrome     # Altered Mental Status    # C/f non-convulsive seizures (ruled out)  Rapid response was called 11/17 as patient was found to be obtunded and non-arousable. Attempts to arousing patient including Narcan were unsuccessful and was later transferred here. With concerns for protecting her airway, patient was intubated.   - Hold pta Nuvigil for sleep, to reduce AMS   - Neuro assessments per unit protocol   - Head CT and Brain MRI without acute pathology   - Ensure delirium prevention   - Neurology consulted. Appreciate recs  - EEG 11/20: When sedation was in use, moderate to severe encephalopathy noted. When sedation was not in use, moderate diffuse encephalopathy was noted. No seizures were seen.  - Continue Precedex for sedation, but attempt to minimize dose     # Progressive Multiple Sclerosis (wheelchair bound)   Patient has longstanding diagnosis of progressive MS now wheelchair bound. Appears she sees Immunology and  Neurology outpatient basis. Last brain MRI in 2017 showing suspicion of demyelinating disease. Unsure of exact biologics patient has was on, appears she has received Lemtrada (2017, 2019), and other past DMTs included Betaseron, Aubagio.   - Reduce PTA  baclofen 20 mg ->10mg daily, and 30 mg -> 20mg at bedtime given AMS and DENNISE   - Hold pta oxybutynin for neurogenic bladder 2/2 MS   - Brain MRI as above      Pulmonary:  # Sleep apnea 2/2 MS   Appears patient follows with sleep clinic/doctor and found to have sleep apnea 2/2 her MS   -Wears a CPAP at home   - Wear CPAP at HS now that she is extubated     # Tobacco use; current smoker?   - Hx of tobacco use, and appears she is current      # Hypercapnia  Patient was found to be obtunded 11/17, unable to arouse despite Narcan d/t concerns r/t pain medication use from femur fracture, however this was unsuccessful. Patient was then transferred to ICU and do to concerns of protecting her airway she was intubated. CO2 was 65 with a bicarb of 30 on VBG. Possible acute on chronic CO2 retention.      # History of pulmonary nodules   Recent CT Chest from 6/16/23 shows right lower lobe nodule has resolved with a stable left lower lobe nodule.   - Now on yearly chest CT follow-ups      Cardiovascular:  # Hx of hypertension   Patient has been on/off levophed. Has received a total of 1.5L of crystalloid.  - 11/20: Off levophed since yesterday afternoon  - Hold pta hydrochlorothiazide for now in setting of lower Bps with sedation     GI/Nutrition:  # GERD   # risk for malnutrition  - pantoprazole 40 mg suspension daily   - Feeding tube okay to use   - RD consulted for feeding     Renal/Fluids/Electrolytes:  # DENNISE (resolved)  # CKD   # Recurrent UTI   # Hypophosphatemia  Patients baseline creatinine around 0.96-1.08. DENNISE with CKD concerns for any sedating related medications could have caused patient to become obtunded. Confirmed UTI as below; previous recurrent UTI with klebsiella  pneuomiae as well.   - Reduce nephrotoxic agents as able   - Monitor I/Os   - UC growing Klebsiella pneumoniae 11/19  - Continue ceftriaxone 1g q24h, treat for 7 days total  - Daily BMP   - Mg, K, Phos protocols      Endocrine:  # Graves s/p thyroid ablation   - TSH 0.64   - Continue pta synthroid 100 mcg daily   - Hypoglycemia protocol      ID:  # Herpes simplex virus; prophylaxis on Acyclovir   - Continue pta Acyclovir 400 mg table BID      # Recurrent UTIs   Suspect AMS related to recurrent UTI  - Stopped PTA macrobid  - Continue ceftriaxone 1g q24h X 7 days  - UC with Klebsiella pneumoniae 10/7 and 11/19  - NGTD 11/19 on blood cx      Hematology/Oncology:    # Anemia of chronic disease 2/2 CKD   # Thrombocytopenia   Hgb with downtrend; 6.2 11/19. No acute signs of bleeding, and patient received a total of 1.5L of fluid so could be partially dilutional. Was given 1 unit PRBCs.   - Recheck hemoglobin stable  - Hold pta ferrous sulfate  - Daily CBC     # Ductal carcinoma in situ of left breast   -Follows with outpatient radiation/oncology and medical oncology annually     Musculoskeletal:  # Two outpatient mechanical falls   # Right distal femur fracture and left malleolus fracture  # Osteoporosis   - Orthopedics following: right leg long leg cast to be kept clean dry and intact. Left leg cam boot once no longer intubated/sedated  - Monitor pulses      Skin:  No acute issues    - Monitor     General Cares/Prophylaxis:    DVT Prophylaxis: Pneumatic Compression Devices, SubQ Heparin (holding given anemia)  GI Prophylaxis: PPI  Restraints: Soft wrist restraints    Family Communication: Manuel ()  Code Status: Full      Lines/tubes/drains:  - PIV   - Verduzco     Disposition:  - [facility] ICU. If she remains stable she can transfer out of ICU tomorrow.     Patient seen and findings/plan discussed with medical ICU staff, Dr. Barbosa.    Selena Watson, APRN CNP    Total patient care time includes 35 minutes    ====================================  INTERVAL HISTORY:   Patient extubated this morning.     OBJECTIVE:   1. VITAL SIGNS:   Temp:  [97.5  F (36.4  C)-99.5  F (37.5  C)] 97.6  F (36.4  C)  Pulse:  [] 70  Resp:  [11-22] 13  BP: ()/(46-98) 128/58  FiO2 (%):  [21 %] 21 %  SpO2:  [79 %-100 %] 99 %  Vent Mode: CMV/AC  (Continuous Mandatory Ventilation/ Assist Control)  FiO2 (%): 21 %  Resp Rate (Set): 12 breaths/min  Tidal Volume (Set, mL): 400 mL  PEEP (cm H2O): 5 cmH2O  Pressure Support (cm H2O): 8 cmH2O  Resp: 13    2. INTAKE/ OUTPUT:   I/O last 3 completed shifts:  In: 1704.97 [I.V.:224.97; NG/GT:560]  Out: 950 [Urine:950]    3. PHYSICAL EXAMINATION:  General: Awake, following commands  HEENT: atraumatic, normocephalic, PERRL  Neuro: Following commands. Tracking.   Pulm/Resp: Clear breath sounds bilaterally without rhonchi, crackles or wheeze, breathing synchronous with the ventilator   CV: RRR  Abdomen: Soft, non-distended, non-tender  : cazares catheter in place, urine yellow and clear  Incisions/Skin: Intact, without erythema  MSK: <3 seconds capillary refill bilaterally, right long lower extremity cast, left knee brace placed. Left foot edematous.   4. LABS:     Venous Blood Gas  Recent Labs   Lab 11/19/23  1759 11/19/23  0519 11/18/23  1838 11/18/23  1313   PHV 7.36 7.42 7.40 7.37   PCO2V 49 39* 44 47   PO2V 22* 39 28 39   HCO3V 28 25 27 27   INDERJIT 1.6 0.6 2.2* 1.1   O2PER 21 21 21 21         Arterial Blood Gases   Recent Labs   Lab 11/18/23  0831   PH 7.51*   PCO2 30*   PO2 97   HCO3 24     Complete Blood Count   Recent Labs   Lab 11/21/23  0459 11/20/23  2337 11/20/23  1412 11/20/23  0506 11/19/23  1758 11/19/23  0609   WBC 4.9 4.2  --  7.8  --  8.2   HGB 9.1* 7.0* 8.1* 8.3*   < > 6.2*   * 132*  --  154  --  145*    < > = values in this interval not displayed.     Basic Metabolic Panel  Recent Labs   Lab 11/21/23  0459 11/21/23  0412 11/21/23  0011 11/20/23  2023 11/20/23  0526  11/20/23  0506 11/19/23 2006 11/19/23  1221 11/19/23  0747 11/19/23  0519     --   --   --   --  139  --  138  --  139   POTASSIUM 3.7  --   --   --   --  4.3  --  4.6  --  3.6   CHLORIDE 108*  --   --   --   --  104  --  106  --  105   CO2 27  --   --   --   --  25  --  26  --  27   BUN 18.2  --   --   --   --  16.2  --  19.4  --  21.3   CR 0.80  --   --   --   --  0.90  --  1.04*  --  1.22*   * 130* 146* 162*   < > 90   < > 131*   < > 108*    < > = values in this interval not displayed.     Liver Function Tests  Recent Labs   Lab 11/21/23  0459 11/19/23  0608 11/18/23  0532 11/17/23  1215   AST 33  --  32  --    ALT 18  --  16  --    ALKPHOS 55  --  61  --    BILITOTAL 0.4  --  0.2  --    ALBUMIN 2.7*  --  3.4*  --    INR  --  1.17*  --  1.26*     Coagulation Profile  Recent Labs   Lab 11/19/23  0608 11/17/23  1215   INR 1.17* 1.26*   PTT 35 30       5. RADIOLOGY:   Recent Results (from the past 24 hour(s))   XR Chest Port 1 View    Impression    RESIDENT PRELIMINARY INTERPRETATION  Impression:   1. Stable support devices.  2. Mildly increased streaky perihilar and bibasilar opacities, likely  atelectasis/edema.   IMAGING:      Recent Results (from the past 24 hour(s))   CT Knee Right w/o Contrast     Narrative     CT KNEE RIGHT WITHOUT CONTRAST 11/17/2023 11:27 AM     CLINICAL HISTORY: distal femur fracture, rule out Hoffa's fragment.  Right knee pain.     TECHNIQUE: CT scan of the abdomen and pelvis was performed without IV  contrast. Multiplanar reformats were obtained. Dose reduction  techniques were used.  CONTRAST: None.     COMPARISON: 11/17/2023 radiographs.     FINDINGS:   Bones: There is an acute comminuted moderately displaced fracture of  the distal femoral metaphysis without significant angulation at the  fracture site. There are a few anteriorly displaced cortical fracture  fragments, as seen on series 2 image 23 and series 4 image 135.  Portions of this fracture extended  intra-articularly at the level of  the trochlear groove as seen on series 2 image 40 and the anterior  portion of the intercondylar notch. Osteopenia. No evidence of  additional fractures.     Soft tissues: There is some more defined soft tissue stranding around  the femoral fracture site. There is moderate global atrophy of the  visualized knee and proximal calf musculature. Small knee joint  effusion. Evidence of intra-articular fracture fragments.        Impression     IMPRESSION:   1.  Acute comminuted mildly displaced fracture of the distal femoral  metaphysis with intra-articular extension.     JOCELYNN LYON MD         SYSTEM ID:  [id number]   XR Chest Port 1 View     Impression     RESIDENT PRELIMINARY INTERPRETATION  Impression: Endotracheal tube tip projects over the high/mid thoracic  trachea. The chest is relatively clear.   XR Abdomen Port 1 View     Impression     RESIDENT PRELIMINARY INTERPRETATION  IMPRESSION: Enteric tube tip and sidehole project over the stomach.      CT HEAD W/O CONTRAST 11/18/2023 9:23 AM     Provided History: AMS w/hx of recent trauma, rule out ICH     Comparison: MRI brain on 6 1/17, CT head 11/1/2017.     Technique: Using multidetector thin collimation helical acquisition  technique, axial, coronal and sagittal CT images from the skull base  to the vertex were obtained without intravenous contrast.      Findings:    No intracranial hemorrhage. No mass effect. No midline shift. No  extra-axial fluid collection. The gray to white matter differentiation  of the cerebral hemispheres is preserved. Ventricles are proportionate  to the sulci. No sulcal effacement. The basal cisterns are patent.  Moderate diffuse cerebral volume loss. Periventricular white matter  hypodensities which are nonspecific, but likely represent chronic  small vessel ischemic disease.     The visualized paranasal sinuses are clear. Left mastoid air cells are  clear. Chronic opacification of the right  mastoid air cells.. Orbits  appear unremarkable. No acute fracture.                                                                      Impression:   1. No acute intracranial pathology.  2. Chronic changes including leukoaraiosis, diffuse cerebral volume  loss, right mastoid air cell opacification.

## 2023-11-21 NOTE — SIGNIFICANT EVENT
Patient suctioned and electively extubated per physician order. Placed on room air, breath sounds were cource and equal bilaterally, labs pending. Patient appears to continue to be slightly mentally altered and cannot follow commands at this time.     Blanca Wilkinson, RRT  11/21/2023 12:00 PM

## 2023-11-22 ENCOUNTER — APPOINTMENT (OUTPATIENT)
Dept: SPEECH THERAPY | Facility: CLINIC | Age: 68
DRG: 533 | End: 2023-11-22
Payer: MEDICARE

## 2023-11-22 LAB
ALBUMIN SERPL BCG-MCNC: 2.8 G/DL (ref 3.5–5.2)
ALP SERPL-CCNC: 56 U/L (ref 40–150)
ALT SERPL W P-5'-P-CCNC: 18 U/L (ref 0–50)
ANION GAP SERPL CALCULATED.3IONS-SCNC: 8 MMOL/L (ref 7–15)
AST SERPL W P-5'-P-CCNC: 27 U/L (ref 0–45)
BILIRUB SERPL-MCNC: 0.4 MG/DL
BUN SERPL-MCNC: 14.6 MG/DL (ref 8–23)
CALCIUM SERPL-MCNC: 7.6 MG/DL (ref 8.8–10.2)
CHLORIDE SERPL-SCNC: 108 MMOL/L (ref 98–107)
CREAT SERPL-MCNC: 0.68 MG/DL (ref 0.51–0.95)
DEPRECATED HCO3 PLAS-SCNC: 24 MMOL/L (ref 22–29)
EGFRCR SERPLBLD CKD-EPI 2021: >90 ML/MIN/1.73M2
ERYTHROCYTE [DISTWIDTH] IN BLOOD BY AUTOMATED COUNT: 17.1 % (ref 10–15)
GLUCOSE SERPL-MCNC: 98 MG/DL (ref 70–99)
HCT VFR BLD AUTO: 29.3 % (ref 35–47)
HGB BLD-MCNC: 9.4 G/DL (ref 11.7–15.7)
MAGNESIUM SERPL-MCNC: 1.7 MG/DL (ref 1.7–2.3)
MCH RBC QN AUTO: 29.5 PG (ref 26.5–33)
MCHC RBC AUTO-ENTMCNC: 32.1 G/DL (ref 31.5–36.5)
MCV RBC AUTO: 92 FL (ref 78–100)
PHOSPHATE SERPL-MCNC: 2.4 MG/DL (ref 2.5–4.5)
PHOSPHATE SERPL-MCNC: 3.8 MG/DL (ref 2.5–4.5)
PLATELET # BLD AUTO: 154 10E3/UL (ref 150–450)
POTASSIUM SERPL-SCNC: 3.8 MMOL/L (ref 3.4–5.3)
PROT SERPL-MCNC: 5.8 G/DL (ref 6.4–8.3)
RBC # BLD AUTO: 3.19 10E6/UL (ref 3.8–5.2)
SODIUM SERPL-SCNC: 140 MMOL/L (ref 135–145)
WBC # BLD AUTO: 4.3 10E3/UL (ref 4–11)

## 2023-11-22 PROCEDURE — 92610 EVALUATE SWALLOWING FUNCTION: CPT | Mod: GN

## 2023-11-22 PROCEDURE — 85014 HEMATOCRIT: CPT

## 2023-11-22 PROCEDURE — 36415 COLL VENOUS BLD VENIPUNCTURE: CPT

## 2023-11-22 PROCEDURE — 258N000003 HC RX IP 258 OP 636

## 2023-11-22 PROCEDURE — 999N000157 HC STATISTIC RCP TIME EA 10 MIN

## 2023-11-22 PROCEDURE — 250N000013 HC RX MED GY IP 250 OP 250 PS 637: Performed by: PEDIATRICS

## 2023-11-22 PROCEDURE — 83735 ASSAY OF MAGNESIUM: CPT

## 2023-11-22 PROCEDURE — 94660 CPAP INITIATION&MGMT: CPT

## 2023-11-22 PROCEDURE — 99233 SBSQ HOSP IP/OBS HIGH 50: CPT | Mod: FS

## 2023-11-22 PROCEDURE — 250N000013 HC RX MED GY IP 250 OP 250 PS 637: Performed by: INTERNAL MEDICINE

## 2023-11-22 PROCEDURE — 200N000002 HC R&B ICU UMMC

## 2023-11-22 PROCEDURE — 80053 COMPREHEN METABOLIC PANEL: CPT

## 2023-11-22 PROCEDURE — 99232 SBSQ HOSP IP/OBS MODERATE 35: CPT | Performed by: STUDENT IN AN ORGANIZED HEALTH CARE EDUCATION/TRAINING PROGRAM

## 2023-11-22 PROCEDURE — 250N000013 HC RX MED GY IP 250 OP 250 PS 637

## 2023-11-22 PROCEDURE — 84100 ASSAY OF PHOSPHORUS: CPT

## 2023-11-22 PROCEDURE — 99207 PR NO BILLABLE SERVICE THIS VISIT: CPT

## 2023-11-22 PROCEDURE — 250N000011 HC RX IP 250 OP 636: Mod: JZ | Performed by: INTERNAL MEDICINE

## 2023-11-22 PROCEDURE — 250N000011 HC RX IP 250 OP 636: Mod: JZ

## 2023-11-22 PROCEDURE — 250N000013 HC RX MED GY IP 250 OP 250 PS 637: Performed by: NURSE PRACTITIONER

## 2023-11-22 PROCEDURE — 250N000011 HC RX IP 250 OP 636: Performed by: NURSE PRACTITIONER

## 2023-11-22 PROCEDURE — 250N000011 HC RX IP 250 OP 636: Mod: JZ | Performed by: NURSE PRACTITIONER

## 2023-11-22 PROCEDURE — 250N000009 HC RX 250: Performed by: INTERNAL MEDICINE

## 2023-11-22 PROCEDURE — 258N000003 HC RX IP 258 OP 636: Performed by: INTERNAL MEDICINE

## 2023-11-22 RX ORDER — MAGNESIUM SULFATE HEPTAHYDRATE 40 MG/ML
2 INJECTION, SOLUTION INTRAVENOUS ONCE
Status: COMPLETED | OUTPATIENT
Start: 2023-11-22 | End: 2023-11-22

## 2023-11-22 RX ORDER — CHOLESTYRAMINE 4 G/9G
1 POWDER, FOR SUSPENSION ORAL 2 TIMES DAILY
Status: DISCONTINUED | OUTPATIENT
Start: 2023-11-22 | End: 2023-11-30 | Stop reason: HOSPADM

## 2023-11-22 RX ORDER — HYDROCHLOROTHIAZIDE 12.5 MG/1
12.5 TABLET ORAL ONCE
Status: COMPLETED | OUTPATIENT
Start: 2023-11-22 | End: 2023-11-22

## 2023-11-22 RX ORDER — LACTOBACILLUS RHAMNOSUS GG 10B CELL
1 CAPSULE ORAL 2 TIMES DAILY
Status: DISCONTINUED | OUTPATIENT
Start: 2023-11-22 | End: 2023-11-30 | Stop reason: HOSPADM

## 2023-11-22 RX ORDER — POTASSIUM CHLORIDE 7.45 MG/ML
10 INJECTION INTRAVENOUS
Status: COMPLETED | OUTPATIENT
Start: 2023-11-22 | End: 2023-11-22

## 2023-11-22 RX ADMIN — CEFTRIAXONE SODIUM 1 G: 1 INJECTION, POWDER, FOR SOLUTION INTRAMUSCULAR; INTRAVENOUS at 20:22

## 2023-11-22 RX ADMIN — LEVOTHYROXINE SODIUM 100 MCG: 100 TABLET ORAL at 08:16

## 2023-11-22 RX ADMIN — HEPARIN SODIUM 5000 UNITS: 5000 INJECTION, SOLUTION INTRAVENOUS; SUBCUTANEOUS at 22:02

## 2023-11-22 RX ADMIN — CHOLESTYRAMINE 4 G: 4 POWDER, FOR SUSPENSION ORAL at 20:22

## 2023-11-22 RX ADMIN — HEPARIN SODIUM 5000 UNITS: 5000 INJECTION, SOLUTION INTRAVENOUS; SUBCUTANEOUS at 05:59

## 2023-11-22 RX ADMIN — Medication 1 CAPSULE: at 13:15

## 2023-11-22 RX ADMIN — MAGNESIUM SULFATE HEPTAHYDRATE 2 G: 40 INJECTION, SOLUTION INTRAVENOUS at 05:59

## 2023-11-22 RX ADMIN — ACETAMINOPHEN 1000 MG: 325 TABLET, FILM COATED ORAL at 12:50

## 2023-11-22 RX ADMIN — BACLOFEN 10 MG: 10 TABLET ORAL at 13:15

## 2023-11-22 RX ADMIN — Medication 2.5 MG: at 20:22

## 2023-11-22 RX ADMIN — Medication 1 CAPSULE: at 20:22

## 2023-11-22 RX ADMIN — MULTIPLE VITAMINS W/ MINERALS TAB 1 TABLET: TAB at 20:22

## 2023-11-22 RX ADMIN — ACYCLOVIR SODIUM 400 MG: 50 INJECTION, SOLUTION INTRAVENOUS at 08:17

## 2023-11-22 RX ADMIN — POTASSIUM CHLORIDE 10 MEQ: 10 INJECTION, SOLUTION INTRAVENOUS at 07:03

## 2023-11-22 RX ADMIN — SODIUM PHOSPHATE, MONOBASIC, MONOHYDRATE AND SODIUM PHOSPHATE, DIBASIC, ANHYDROUS 9 MMOL: 142; 276 INJECTION, SOLUTION INTRAVENOUS at 06:15

## 2023-11-22 RX ADMIN — DULOXETINE 80 MG: 60 CAPSULE, DELAYED RELEASE ORAL at 07:55

## 2023-11-22 RX ADMIN — ACYCLOVIR SODIUM 400 MG: 50 INJECTION, SOLUTION INTRAVENOUS at 21:59

## 2023-11-22 RX ADMIN — BACLOFEN 20 MG: 10 TABLET ORAL at 21:59

## 2023-11-22 RX ADMIN — Medication 1 MG: at 20:22

## 2023-11-22 RX ADMIN — OXYBUTYNIN CHLORIDE 10 MG: 10 TABLET, EXTENDED RELEASE ORAL at 07:54

## 2023-11-22 RX ADMIN — HEPARIN SODIUM 5000 UNITS: 5000 INJECTION, SOLUTION INTRAVENOUS; SUBCUTANEOUS at 13:16

## 2023-11-22 RX ADMIN — HYDROCHLOROTHIAZIDE 12.5 MG: 12.5 TABLET ORAL at 15:07

## 2023-11-22 RX ADMIN — FAMOTIDINE 40 MG: 20 TABLET ORAL at 21:59

## 2023-11-22 RX ADMIN — LATANOPROST 1 DROP: 50 SOLUTION OPHTHALMIC at 08:12

## 2023-11-22 RX ADMIN — ACETAMINOPHEN 1000 MG: 325 TABLET, FILM COATED ORAL at 21:59

## 2023-11-22 RX ADMIN — POTASSIUM CHLORIDE 10 MEQ: 10 INJECTION, SOLUTION INTRAVENOUS at 05:59

## 2023-11-22 RX ADMIN — Medication 2.5 MG: at 13:53

## 2023-11-22 RX ADMIN — Medication 1 MG: at 00:47

## 2023-11-22 RX ADMIN — BACLOFEN 10 MG: 10 TABLET ORAL at 07:56

## 2023-11-22 ASSESSMENT — ACTIVITIES OF DAILY LIVING (ADL)
ADLS_ACUITY_SCORE: 59
DEPENDENT_IADLS:: CLEANING;COOKING;LAUNDRY;SHOPPING;MEAL PREPARATION;MEDICATION MANAGEMENT;MONEY MANAGEMENT;TRANSPORTATION;INCONTINENCE
ADLS_ACUITY_SCORE: 59

## 2023-11-22 NOTE — CONSULTS
Care Management Initial Consult    General Information  Assessment completed with:  ,         Primary Care Provider verified and updated as needed:     Readmission within the last 30 days:           Advance Care Planning:            Communication Assessment  Patient's communication style: spoken language (English or Bilingual)             Cognitive  Cognitive/Neuro/Behavioral: .WDL except, all  Level of Consciousness: alert  Arousal Level: opens eyes spontaneously  Orientation:  (says no when asked if she knows where she is)  Mood/Behavior: anxious, agitated, restless  Best Language:  (word finding difficulty)  Speech:  (repeats the same words, word finding difficulty)    Living Environment:   People in home:       Current living Arrangements:        Able to return to prior arrangements:         Family/Social Support:  Care provided by:    Provides care for:                  Description of Support System:           Current Resources:   Patient receiving home care services:       Community Resources:    Equipment currently used at home:    Supplies currently used at home:      Employment/Financial:  Employment Status:          Financial Concerns:             Does the patient's insurance plan have a 3 day qualifying hospital stay waiver?  No    Lifestyle & Psychosocial Needs:  Social Determinants of Health     Food Insecurity: Not on file   Depression: At risk (4/26/2023)    PHQ-2     PHQ-2 Score: 4   Housing Stability: Not on file   Tobacco Use: High Risk (11/16/2023)    Patient History     Smoking Tobacco Use: Every Day     Smokeless Tobacco Use: Never     Passive Exposure: Not on file   Financial Resource Strain: Not on file   Alcohol Use: Not on file   Transportation Needs: Not on file   Physical Activity: Not on file   Interpersonal Safety: Not on file   Stress: Not on file   Social Connections: Not on file       Functional Status:  Prior to admission patient needed assistance:              Mental Health  Status:          Chemical Dependency Status:                Values/Beliefs:  Spiritual, Cultural Beliefs, Zoroastrianism Practices, Values that affect care:                 Additional Information:    Writer went to meet with patient in room. Introduced self and RNCC role. Patient repeatedly stated that her leg hurt and wanted assistance with putting a pillow under her leg. Writer explained to patient that she already had a pillow under her leg. She continued to yell out in agony and asked for help. Patient was unable to describe pain, but nodded yes when asked if her right leg hurt. Patient was unable to rate pain on a scale of 0-10. Writer notified nurse about patient's pain.  Patient declined answering questions and deferred questions to her , Manuel. Called Manuel and left a message requesting a call back.      Elly Pulliam, MSN, APRN, AGCNS-BC - RN Care Coordinator  5MS [phone]  Sitting at [phone]    SEARCHABLE in [facility] - search CARE COORDINATOR     [place] ([phone]) Saturday & Sunday; ([id number])  Recognized Holidays     Units: 5A, 5B & 5C  Pager: [phone]  Units: 6B, 6C & 6D    Pager: [phone]  Units: 7A, 7B, 7C & 7D    Pager: [phone]  Units: 6A & ICU   Pager: [phone]  Units: 5 Ortho, 5MS & WB ED Pager: [phone]  Units: 6MS, 8A & 10 ICU  Pager [phone]     [facility] ([phone]) Saturday and Sunday    Units: 6 Med/Surg, 8A, 10 ICU, & Pediatric Units: [phone]    Units: 5 Ortho, 5Med/Surg & WB ED: [phone]    Elly Pulliam RN

## 2023-11-22 NOTE — CONSULTS
Care Management Initial Consult    General Information  Assessment completed with: Spouse or significant other, Manuel Marinelli  Type of CM/SW Visit: Initial Assessment    Primary Care Provider verified and updated as needed: Yes   Readmission within the last 30 days: no previous admission in last 30 days      Reason for Consult: discharge planning  Advance Care Planning: Advance Care Planning Reviewed: other (see comments) ( declined further information regarding HCD. He wants to review with wife outside of hospital.)          Communication Assessment  Patient's communication style: spoken language (English or Bilingual)             Cognitive  Cognitive/Neuro/Behavioral: .WDL except, all  Level of Consciousness: intermittent confusion  Arousal Level: opens eyes spontaneously  Orientation:  (says no when asked if she knows where she is)  Mood/Behavior: anxious, agitated, restless  Best Language:  (word finding difficulty)  Speech:  (repeats the same words, word finding difficulty)    Living Environment:   People in home: bharat Marinelli  Current living Arrangements: house      Able to return to prior arrangements: yes       Family/Social Support:  Care provided by: spouse/significant other  Provides care for: no one, unable/limited ability to care for self  Marital Status:     Manuel Marinelli       Description of Support System: Supportive, Involved    Support Assessment: Adequate family and caregiver support, Adequate social supports, Caregiver experiencing high stress    Current Resources:   Patient receiving home care services: No (Has received home care in the past but unsure of through which company.)     Community Resources: DME  Equipment currently used at home:  Walker, wheelchair, grab bars toilet, commode, bipap  Supplies currently used at home: Incontinence Supplies    Employment/Financial:  Employment Status: disabled, retired        Financial Concerns: none   Referral to Financial  Worker: No       Does the patient's insurance plan have a 3 day qualifying hospital stay waiver?  No    Lifestyle & Psychosocial Needs:  Social Determinants of Health     Food Insecurity: Not on file   Depression: At risk (4/26/2023)    PHQ-2     PHQ-2 Score: 4   Housing Stability: Not on file   Tobacco Use: High Risk (11/16/2023)    Patient History     Smoking Tobacco Use: Every Day     Smokeless Tobacco Use: Never     Passive Exposure: Not on file   Financial Resource Strain: Not on file   Alcohol Use: Not on file   Transportation Needs: Not on file   Physical Activity: Not on file   Interpersonal Safety: Not on file   Stress: Not on file   Social Connections: Not on file       Functional Status:  Prior to admission patient needed assistance:   Dependent ADLs:: Ambulation-walker, Bathing, Dressing, Incontinence, Transfers, Wheelchair-with assist  Dependent IADLs:: Cleaning, Cooking, Laundry, Shopping, Meal Preparation, Medication Management, Money Management, Transportation, Incontinence  Assesssment of Functional Status: Not at  functional baseline, Not at baseline with ADL Functioning, Not at baseline with mobility    Mental Health Status:  Mental Health Status: No Current Concerns  Mental Health Management:  (n/a)    Chemical Dependency Status:  Chemical Dependency Status: Current Concern  Chemical Dependency Management:  (tobacco smoker)          Values/Beliefs:  Spiritual, Cultural Beliefs, Uatsdin Practices, Values that affect care: no       Cultural/Uatsdin Practices Patient Routinely Participates In: prayer       Additional Information:    HPI: Marylee Woods is a 68 year old female with PMH for graves disease with hx of thyroid ablation, multiple sclerosis, CKD, hypertension, sleep apnea, depression, 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 On 11/17 a rapid  response was called as patient was found to be obtunded and transferred to the ICU after intubation for airway protection. Acute mental status change and respiratory failure thought likely multifactorial including narcotic pain medication, acute infection (UTI), and acute on chronic hypercapneic respiratory failure. Extubated to room air 11/21, and transferred to the general medical floor 11/22.     , Manuel, returned call. Verified address, phone number, PCP, and insurance. He declined further information regarding healthcare directives and stated that he wants to review with his wife outside of the hospital.    Patient lives in a 2 story house with a wheelchair ramp to the main door. She lives on the main floor and cannot access the 2nd floor.     is the patient's main caregiver for most ADLs and all IADLs. Patient is unable to use shower/bath tub. She occasionally takes baths with assistance or she washes up in the kitchen sink. They do have a son and daughter in law nearby but they are unable to provide much help because they have young children.    Patient does Step Physical therapy weekly.    She is a current tobacco smoker. She smokes about 1.5-2 packs per week.        Elly Pulliam, MSN, APRN, AGCNS-BC - RN Care Coordinator  5MS [phone]    SEARCHABLE in [facility] - search CARE COORDINATOR     For Weekend & Holiday on call RN Care Coordinator:  (Tasks: Home care, home infusion, medical equipment, transportation, IMM & GONZALEZ forms, etc.)  [place] (0800 - 1630) Saturday and Sunday    Units: 5A, 5B, & 5C: [phone]    Units: 6B, 6C, 6D: [phone]    Units: 7A, 7B, 7C, 7D: [phone]    Units: 6A/ICU : [phone]     [facility] ([phone]) Saturday and Sunday    Units: 6 Med/Surg, 8A, 10 ICU, & Pediatric Units: [phone]    Units: 5 Ortho, 5Med/Surg & WB ED: [phone]

## 2023-11-22 NOTE — PLAN OF CARE
ICU End of Shift Summary:       Neuro: alert, has word finding difficulty   Pulm/Resp: room air, then placed on CPAP at night- tolerated well.  CV: normal sinus rhythm, hypertensive  GI: NPO. Passed swallow study. Had 4 times bowel movement, large, brown, loose to soft in consistency. Rectal tube insertion attempted, unsuccessful as patient started to pass soft stool.  : Verduzco catheter patent, urine output    Access: peripheral IV on right forearm, extended dwell peripheral IV on right lower forearm and left lower forearm  Skin: cast on right leg- upper area soiled with stool- night shift provider informed; left heel PU, abrasion on right upper thigh, bruise on right and left arms  Gtts: TKO normal saline 10 ml/hr on both extended dwell PIV  Drains: Verduzco catheter    - Sodium phosphate. Magnesium sulfate and potassium chloride replaced.    Plan: Discuss with ortho team regarding the right leg cast that got soiled

## 2023-11-22 NOTE — PROGRESS NOTES
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 On 11/17 a rapid response was called as patient was found to be obtunded and transferred to the ICU after intubation for airway protection. Acute mental status change and respiratory failure thought likely multifactorial including narcotic pain medication, acute infection (UTI), and acute on chronic hypercapneic respiratory failure. Extubated to room air 11/21, and transferred to the general medical floor 11/22.     PLAN:     # Altered Mental Status    # C/f non-convulsive seizures (ruled out)  Rapid response was called 11/17 as patient was found to be obtunded and non-arousable. Attempts to arousing patient including Narcan were unsuccessful and was later transferred here. With concerns for protecting her airway, patient was intubated.   - Hold pta Nuvigil for sleep, to reduce AMS   - Neuro assessments per unit protocol   - Head CT and Brain MRI on 11/19 without acute pathology  - Ensure delirium prevention  - Neurology consulted. Appreciate recs. They did not appreciate any seizures on EEG and have signed off at this point. If things do not improve they can be reconsulted, otherwise, repeat MRI of the head in one week was recommended to evaluate for anoxic brain injury (though unlikely)   - EEG 11/20: When sedation was in use, moderate to severe encephalopathy noted. When sedation was not in use, moderate diffuse encephalopathy was noted. No seizures were seen.    # Progressive Multiple Sclerosis  (wheelchair bound)   Patient has longstanding diagnosis of progressive MS now wheelchair bound. Appears she sees Immunology and Neurology outpatient basis. Last brain MRI in 2017 showing suspicion of demyelinating disease. Repeat brain MRI on 11/19/23 showed scattered T2 hyperintense foci throughout the subcorical and periventricular white matter grossly unchanged when compared to MRI on 11/1/2017. Unsure of exact biologics patient has was on, appears she has received Lemtrada (2017, 2019), and other past DMTs included Betaseron, Aubagio.   - Reduce PTA  baclofen 20 mg ->10mg daily, and 30 mg -> 20mg at bedtime given AMS and DENNISE  - Hold pta oxybutynin for neurogenic bladder 2/2 MS    # Two outpatient mechanical falls   # Right distal femur fracture and left malleolus fracture  # Osteoporosis   - Orthopedics following: right leg long leg cast to be kept clean dry and intact. Left leg cam boot once no longer intubated/sedated  - Monitor pulses  - Oxycodone 2.5 mg Q6 hours, but minimize use to avoid oversedation  - Baclofen dose reduced per Neurology recommendations     # Major Depressive Disorder   - Continue Cymbalta, DENNISE resolved     # Sleep apnea 2/2 MS   Appears patient follows with sleep clinic/doctor and found to have sleep apnea 2/2 her MS   - Wears a CPAP at home   - Wear CPAP at HS now that she is extubated     # Tobacco use; current smoker?   - Hx of tobacco use, and appears she is current      # Hypercapnia - Resolved  # History of pulmonary nodules   Recent CT Chest from 6/16/23 shows right lower lobe nodule has resolved with a stable left lower lobe nodule.   - Now on yearly chest CT follow-ups      # Hx of hypertension  Patient has been on/off levophed. Has received a total of 1.5L of crystalloid.  - 11/22 restarted PTA hydrochlorothiazide     # GERD  # risk for malnutrition  - pantoprazole 40 mg suspension daily  - Feeding tube okay to use  - RD consulted for feeding  - Speech consulted for swallow eval       # DENNISE (resolved)  # CKD   # Recurrent UTI   # Hypophosphatemia  Patients baseline creatinine around 0.96-1.08. DENNISE with CKD concerns for any sedating related medications could have caused patient to become obtunded. Confirmed UTI as below; previous recurrent UTI with klebsiella pneuomiae as well.   - Reduce nephrotoxic agents as able   - Monitor I/Os   - UC growing Klebsiella pneumoniae 11/19  - Continue ceftriaxone 1g q24h, treat for 7 days total  - Daily BMP  - Mg, K, Phos protocols     # Hx Graves s/p thyroid ablation   - TSH 0.64   - Continue pta synthroid 100 mcg daily   - Hypoglycemia protocol     # Herpes simplex virus; prophylaxis on Acyclovir   - Continue pta Acyclovir 400 mg (Changed to IV for now and can change back to tablet once swallow is evaluated and passed)     # Recurrent UTIs  Suspect AMS related to recurrent UTI  - Stopped PTA macrobid  - Continue ceftriaxone 1g q24h X 7 days  - UC with Klebsiella pneumoniae 10/7 and 11/19  - NGTD 11/19 on blood cx      # Anemia of chronic disease 2/2 CKD   # Thrombocytopenia   Hgb 6.2 11/19. Was given 1 unit PRBCs.   - hemoglobin stable  - Hold pta ferrous sulfate  - Daily CBC      # Ductal carcinoma in situ of left breast   - Follows with outpatient radiation/oncology and medical oncology annually            Diet: NPO for Medical/Clinical Reasons Except for: Meds    DVT Prophylaxis: Heparin SQ  Verduzco Catheter: PRESENT, indication: Deep Sedation/Paralysis;Strict 1-2 Hour I&O, Strict 1-2 Hour I&O  Lines: None     Cardiac Monitoring: ACTIVE order. Indication: ICU  Code Status: Full Code      Clinically Significant Risk Factors              # Hypoalbuminemia: Lowest albumin = 2.7 g/dL at 11/21/2023  4:59 AM, will monitor as appropriate                       Disposition Plan             Domonique Lao MD  Hospitalist Service, GOLD TEAM [address] [place] [address]  Securely message with [employer] (more info)  Text page via [employer]  Paging/Directory   See signed in provider for up to date coverage information  ______________________________________________________________________    Interval History   Transferred from ICU.   Currently Marylee is having increased pain in the R leg. About to receive pain medication. She is alert but continues to have some confusion.     Physical Exam   Vital Signs: Temp: 97.2  F (36.2  C) Temp src: Axillary BP: (!) 151/71 Pulse: 94   Resp: 19 SpO2: 99 % O2 Device: None (Room air)    Weight: 134 lbs 11.22 oz    General: Awake, alert, appears in distress.   Mouth: edentulous   CV: Normal rate and rhythm, normal S1 and S2. No murmurs, rubs, gallops. Radial pulses 2+ and symmetric.  Respiratory: Lungs clear to auscultation bilaterally. No wheezing, rhonchi, or rales.  Abdomen: Soft, non-distended. Non-tender to palpation. No rebound tenderness or guarding. +BS.   MSK: RLE ace wrapped. Joint contractures.   Extremities: Trace edema of the feet bilaterally  Skin: Warm, dry. No rash on visible skin.   Neuro: Alert. Face symmetric, speech slow but intact.   Psych: Tearful      Medical Decision Making       45 MINUTES SPENT BY ME on the date of service doing chart review, history, exam, documentation & further activities per the note.      Data     I have personally reviewed the following data over the past 24 hrs:    4.3  \   9.4 (L)   / 154     140 108 (H) 14.6 /  98   3.8 24 0.68 \     ALT: 18 AST: 27 AP: 56 TBILI: 0.4   ALB: 2.8 (L) TOT PROTEIN: 5.8 (L) LIPASE: N/A       Imaging results reviewed over the past 24 hrs:   No results found for this or any previous visit (from the past 24 hour(s)).

## 2023-11-22 NOTE — PROGRESS NOTES
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 On 11/17 a rapid response was called as patient was found to be obtunded and transferred to the ICU.    CHANGES and MAJOR THINGS TODAY:  - Transfer to floor  - Restart home hydrochlorothiazide    PLAN:    Neuro:  # Pain and sedation  - Oxycodone 2.5 mg Q6 hours, but minimize use to avoid oversedation  - Baclofen dose reduced per Neurology recommendations    # Major Depressive Disorder   - Continue Cymbalta, DENNISE resolved    # Altered Mental Status    # C/f non-convulsive seizures (ruled out)  Rapid response was called 11/17 as patient was found to be obtunded and non-arousable. Attempts to arousing patient including Narcan were unsuccessful and was later transferred here. With concerns for protecting her airway, patient was intubated.   - Hold pta Nuvigil for sleep, to reduce AMS   - Neuro assessments per unit protocol   - Head CT and Brain MRI on 11/19 without acute pathology  - Ensure delirium prevention  - Neurology consulted. Appreciate recs. They did not appreciate any seizures on EEG and have signed off at this point. If things do not improve they can be reconsulted, otherwise, repeat MRI of the head in one week was recommended to evaluate for anoxic brain injury (though unlikely)   - EEG 11/20: When sedation was in use, moderate to severe encephalopathy noted. When sedation was not in use, moderate diffuse encephalopathy was noted. No seizures were seen.    # Progressive Multiple Sclerosis (wheelchair bound)   Patient has longstanding diagnosis of progressive MS  now wheelchair bound. Appears she sees Immunology and Neurology outpatient basis. Last brain MRI in 2017 showing suspicion of demyelinating disease. Repeat brain MRI on 11/19/23 showed scattered T2 hyperintense foci throughout the subcorical and periventricular white matter grossly unchanged when compared to MRI on 11/1/2017. Unsure of exact biologics patient has was on, appears she has received Lemtrada (2017, 2019), and other past DMTs included Betaseron, Aubagio.   - Reduce PTA  baclofen 20 mg ->10mg daily, and 30 mg -> 20mg at bedtime given AMS and DENNISE  - Hold pta oxybutynin for neurogenic bladder 2/2 MS    Pulmonary:  # Sleep apnea 2/2 MS   Appears patient follows with sleep clinic/doctor and found to have sleep apnea 2/2 her MS   - Wears a CPAP at home   - Wear CPAP at HS now that she is extubated    # Tobacco use; current smoker?   - Hx of tobacco use, and appears she is current     # Hypercapnia - Resolved  # History of pulmonary nodules   Recent CT Chest from 6/16/23 shows right lower lobe nodule has resolved with a stable left lower lobe nodule.   - Now on yearly chest CT follow-ups     Cardiovascular:  # Hx of hypertension  Patient has been on/off levophed. Has received a total of 1.5L of crystalloid.  - 11/22 restarted PTA hydrochlorothiazide    GI/Nutrition:  # GERD  # risk for malnutrition  - pantoprazole 40 mg suspension daily  - Feeding tube okay to use  - RD consulted for feeding  - Speech consulted for swallow eval     Renal/Fluids/Electrolytes:  # DENNISE (resolved)  # CKD   # Recurrent UTI   # Hypophosphatemia  Patients baseline creatinine around 0.96-1.08. DENNISE with CKD concerns for any sedating related medications could have caused patient to become obtunded. Confirmed UTI as below; previous recurrent UTI with klebsiella pneuomiae as well.   - Reduce nephrotoxic agents as able   - Monitor I/Os   - UC growing Klebsiella pneumoniae 11/19  - Continue ceftriaxone 1g q24h, treat for 7 days total  - Daily  BMP  - Mg, K, Phos protocols    Endocrine:  # Hx Graves s/p thyroid ablation   - TSH 0.64   - Continue pta synthroid 100 mcg daily   - Hypoglycemia protocol     ID:  # Herpes simplex virus; prophylaxis on Acyclovir   - Continue pta Acyclovir 400 mg (Changed to IV for now and can change back to tablet once swallow is evaluated and passed)    # Recurrent UTIs  Suspect AMS related to recurrent UTI  - Stopped PTA macrobid  - Continue ceftriaxone 1g q24h X 7 days  - UC with Klebsiella pneumoniae 10/7 and 11/19  - NGTD 11/19 on blood cx     Hematology/Oncology:    # Anemia of chronic disease 2/2 CKD   # Thrombocytopenia   Hgb 6.2 11/19. Was given 1 unit PRBCs.   - hemoglobin stable  - Hold pta ferrous sulfate  - Daily CBC      # Ductal carcinoma in situ of left breast   - Follows with outpatient radiation/oncology and medical oncology annually     Musculoskeletal:  # Two outpatient mechanical falls   # Right distal femur fracture and left malleolus fracture  # Osteoporosis   - Orthopedics following: right leg long leg cast to be kept clean dry and intact. Left leg cam boot once no longer intubated/sedated  - Monitor pulses    Skin:  No acute issues   - Monitor    General Cares/Prophylaxis:    DVT Prophylaxis: Heparin SQ  GI Prophylaxis: H2 Blocker  Restraints: None  Family Communication:  updated at bedside  Code Status: Full Code    Lines/tubes/drains:  - PIV x 3  - Verduzco    Disposition:  - Transfer out of ICU    Patient seen and findings/plan discussed with medical ICU staff, Dr. Liam Barbosa.    I spent a total of 30 minutes (excluding procedure time) personally providing and directing critical care services at the bedside and on the critical care unit for Marylee Woods    Jose Cole PA-C    ====================================  INTERVAL HISTORY:   - No acute events overnight. Patient continues to have incoherent speech.    OBJECTIVE:   1. VITAL SIGNS:   Temp:  [97.4  F (36.3  C)-99  F (37.2  C)]  97.4  F (36.3  C)  Pulse:  [] 83  Resp:  [14-26] 15  BP: (137-166)/(60-92) 143/65  FiO2 (%):  [21 %-25 %] 21 %  SpO2:  [97 %-100 %] 99 %  Vent Mode: (S) CPAP/PS  (Continuous positive airway pressure with Pressure Support)  FiO2 (%): 21 %  Resp Rate (Set): 12 breaths/min  Tidal Volume (Set, mL): 400 mL  PEEP (cm H2O): 5 cmH2O  Pressure Support (cm H2O): 8 cmH2O  Resp: 15    2. INTAKE/ OUTPUT:   I/O last 3 completed shifts:  In: 1118.44 [I.V.:793.44; NG/GT:150]  Out: 1720 [Urine:1720]    3. PHYSICAL EXAMINATION:  PHYSICAL EXAM  General: Lying supine in bed, in no acute distress  Skin:  Pale, warm and dry.   Neuro: CNII-XII grossly intact  ENT: PERRL, EOMI, no nystagmus, anicteric  Heart:Tachycardic rate and regular rhythm, no murmurs, rubs, or gallops appreciated  Lungs: CTA BL, unlabored breathing, on room air  Abdomen: Soft, nondistended, nontender, BS x4  Extremities: Moves to command, no edema, capillary refill <3 sec BL upper and lower extremities.   Mental:  Alert and oriented to person, place, and time. Somnolent but arousable. Incoherent speech.    4. LABS:   Arterial Blood Gases   Recent Labs   Lab 11/18/23  0831   PH 7.51*   PCO2 30*   PO2 97   HCO3 24     Complete Blood Count   Recent Labs   Lab 11/22/23  0437 11/21/23  0459 11/20/23  2337 11/20/23  1412 11/20/23  0506   WBC 4.3 4.9 4.2  --  7.8   HGB 9.4* 9.1* 7.0* 8.1* 8.3*    144* 132*  --  154     Basic Metabolic Panel  Recent Labs   Lab 11/22/23  0437 11/21/23  2352 11/21/23  1955/23  1557 11/21/23  0805 11/21/23  0459 11/20/23  0526 11/20/23  0506 11/19/23 2006 11/19/23  1221     --   --   --   --  142  --  139  --  138   POTASSIUM 3.8  --   --   --   --  3.7  --  4.3  --  4.6   CHLORIDE 108*  --   --   --   --  108*  --  104  --  106   CO2 24  --   --   --   --  27  --  25  --  26   BUN 14.6  --   --   --   --  18.2  --  16.2  --  19.4   CR 0.68  --   --   --   --  0.80  --  0.90  --  1.04*   GLC 98 96 92 113*   < > 128*   <  > 90   < > 131*    < > = values in this interval not displayed.     Liver Function Tests  Recent Labs   Lab 11/22/23  0437 11/21/23  0459 11/19/23  0608 11/18/23  0532 11/17/23  1215   AST 27 33  --  32  --    ALT 18 18  --  16  --    ALKPHOS 56 55  --  61  --    BILITOTAL 0.4 0.4  --  0.2  --    ALBUMIN 2.8* 2.7*  --  3.4*  --    INR  --   --  1.17*  --  1.26*     Coagulation Profile  Recent Labs   Lab 11/19/23  0608 11/17/23  1215   INR 1.17* 1.26*   PTT 35 30       5. RADIOLOGY:   No results found for this or any previous visit (from the past 24 hour(s)).

## 2023-11-22 NOTE — PLAN OF CARE
Goal Outcome Evaluation:      Plan of Care Reviewed With: spouse    Overall Patient Progress: improvingOverall Patient Progress: improving    Outcome Evaluation: Met with patient in room. She was unwilling to review questions regarding discharge planning. Spoke with , Manuel, over phone.    Elly Pulliam, MSN, APRN, AGCNS-BC - RN Care Coordinator  5MS [phone]    SEARCHABLE in [facility] - search CARE COORDINATOR     For Weekend & Holiday on call RN Care Coordinator:  (Tasks: Home care, home infusion, medical equipment, transportation, IMM & GONZALEZ forms, etc.)  [place] (0800 - 1630) Saturday and Sunday    Units: 5A, 5B, & 5C: [phone]    Units: 6B, 6C, 6D: [phone]    Units: 7A, 7B, 7C, 7D: [phone]    Units: 6A/ICU : [phone]     [facility] ([phone]) Saturday and Sunday    Units: 6 Med/Surg, 8A, 10 ICU, & Pediatric Units: [phone]    Units: 5 Ortho, 5Med/Surg & WB ED: [phone]

## 2023-11-22 NOTE — PLAN OF CARE
Major Shift Events:  Patient extubated by RT at 1200 to Room air. Patient intermittently follows commands, increasing verbal response this evening but still having word finding difficulty. One large watery BM. LLE remains in brace, RLE swollen, ice applied.   Plan: Monitor close neuro status and respiratory status.   For vital signs and complete assessments, please see documentation flowsheets.

## 2023-11-22 NOTE — PROGRESS NOTES
CLINICAL NUTRITION SERVICES - BRIEF NOTE     Nutrition Prescription      Recommendations already ordered by Registered Dietitian (RD):  Start chocolate Ensure once daily at dinner meal + PRN (pt/family can request additional servings at any time)    Discontinue Protein flush as no longer needed (pt no longer on TF)     As diet advanced and due to continued somewhat altered mental status, will start room service assist ( will go to room and obtain meal orders from pt, if they are having difficulty obtaining orders from pt, they can ask  if in the room, if not, then send a non-select tray)    Will updated diet order to have  leave trays at nursing station as pt requires set up/feeding assistance per SLP, otherwise diet per SLP recommendations     Future/Additional Recommendations:  Monitor meal/supplement intakes, wt/lab trends      NEW FINDINGS   Pt reviewed during ICU team rounds earlier today, then visited pt and  at bedside, reviewed SLP to see pt for diet advancement (they did and advanced pt's diet to puree with thin liquids with pt needing to be alert and sitting fully upright for all intake, assist w/ tray set-up and feeding as needed). Reviewed nutrition history with , he noted pt's UBW can range from 120-135 lbs, and he notes pt usually has more constipation type bowel patterns at home and is on bowel regimen, reviewed pt having loose stools now, so will discuss with Providers adding medications to combat this, reviewed likely need for supplement pending diet advancement,  agreeing. Spoke with ICU Providers after pt/family visit, and they added probiotic and questran for loose stools.     INTERVENTIONS  Implementation  Collaboration with staff/Providers - as noted/ordered above   Medical food supplement therapy - ordered as above  Modular - discontinued as pt no longer on TF    Monitoring/Evaluation  Will continue to monitor and evaluate per protocol.      Janis Epperson RD, CNSC, LD  [facility] [address] pager: [phone]  Weekend/holiday pager: [phone]

## 2023-11-22 NOTE — PROGRESS NOTES
Care Management Follow Up    Length of Stay (days): 5    Expected Discharge Date: Unknown     Concerns to be Addressed:       Patient plan of care discussed at interdisciplinary rounds: Yes    Anticipated Discharge Disposition:  unknown     Anticipated Discharge Services:  unknown  Anticipated Discharge DME:  wheelchair    Patient/family educated on Medicare website which has current facility and service quality ratings:  N/A  Education Provided on the Discharge Plan:  N/A  Patient/Family in Agreement with the Plan:  N/A    Referrals Placed by CM/SW:  N/A  Private pay costs discussed: Not applicable    Additional Information:  Writer went to complete initial assessment at 14:00.  Writer went to meet with patient in room. Introduced self and RNCC role. Patient repeatedly stated that her leg hurt and wanted assistance with putting a pillow under her leg. Writer explained to patient that she already had a pillow under her leg. She continued to yell out in agony and asked for help. Patient was unable to describe pain, but nodded yes when asked if her right leg hurt. Patient was unable to rate pain on a scale of 0-10. Writer notified nurse about patient's pain.  Patient declined answering questions and deferred questions to her , Manuel. Called Manuel and left a message requesting a call back.      Elly Pulliam, MSN, APRN, [facility] Hospital[facility] - RN Care Coordinator  5MS [phone]    SEARCHABLE in [facility] - search CARE COORDINATOR     For Weekend & Holiday on call RN Care Coordinator:  (Tasks: Home care, home infusion, medical equipment, transportation, IMM & GONZALEZ forms, etc.)  [place] (0800 - 1630) Saturday and Sunday    Units: 5A, 5B, & 5C: [phone]    Units: 6B, 6C, 6D: [phone]    Units: 7A, 7B, 7C, 7D: [phone]    Units: 6A/ICU : [phone]     [facility] ([phone]) Saturday and Sunday    Units: 6 Med/Surg, 8A, 10 ICU, & Pediatric Units: [phone]    Units: 5 Ortho, 5Med/Surg & WB ED:  [phone]

## 2023-11-23 LAB
ALBUMIN SERPL BCG-MCNC: 2.9 G/DL (ref 3.5–5.2)
ALP SERPL-CCNC: 55 U/L (ref 40–150)
ALT SERPL W P-5'-P-CCNC: 19 U/L (ref 0–50)
ANION GAP SERPL CALCULATED.3IONS-SCNC: 9 MMOL/L (ref 7–15)
AST SERPL W P-5'-P-CCNC: 23 U/L (ref 0–45)
BACTERIA BLD CULT: NO GROWTH
BACTERIA BLD CULT: NO GROWTH
BILIRUB SERPL-MCNC: 0.4 MG/DL
BUN SERPL-MCNC: 9.5 MG/DL (ref 8–23)
CALCIUM SERPL-MCNC: 8.1 MG/DL (ref 8.8–10.2)
CHLORIDE SERPL-SCNC: 103 MMOL/L (ref 98–107)
CREAT SERPL-MCNC: 0.64 MG/DL (ref 0.51–0.95)
DEPRECATED HCO3 PLAS-SCNC: 23 MMOL/L (ref 22–29)
EGFRCR SERPLBLD CKD-EPI 2021: >90 ML/MIN/1.73M2
ERYTHROCYTE [DISTWIDTH] IN BLOOD BY AUTOMATED COUNT: 16.7 % (ref 10–15)
GLUCOSE BLDC GLUCOMTR-MCNC: 86 MG/DL (ref 70–99)
GLUCOSE BLDC GLUCOMTR-MCNC: 88 MG/DL (ref 70–99)
GLUCOSE BLDC GLUCOMTR-MCNC: 90 MG/DL (ref 70–99)
GLUCOSE BLDC GLUCOMTR-MCNC: 90 MG/DL (ref 70–99)
GLUCOSE BLDC GLUCOMTR-MCNC: 95 MG/DL (ref 70–99)
GLUCOSE SERPL-MCNC: 91 MG/DL (ref 70–99)
HCT VFR BLD AUTO: 29.8 % (ref 35–47)
HGB BLD-MCNC: 9.7 G/DL (ref 11.7–15.7)
HOLD SPECIMEN: NORMAL
MAGNESIUM SERPL-MCNC: 1.8 MG/DL (ref 1.7–2.3)
MCH RBC QN AUTO: 29.4 PG (ref 26.5–33)
MCHC RBC AUTO-ENTMCNC: 32.6 G/DL (ref 31.5–36.5)
MCV RBC AUTO: 90 FL (ref 78–100)
PHOSPHATE SERPL-MCNC: 1.9 MG/DL (ref 2.5–4.5)
PHOSPHATE SERPL-MCNC: 2.5 MG/DL (ref 2.5–4.5)
PLATELET # BLD AUTO: 175 10E3/UL (ref 150–450)
POTASSIUM SERPL-SCNC: 3.8 MMOL/L (ref 3.4–5.3)
PROT SERPL-MCNC: 5.8 G/DL (ref 6.4–8.3)
RBC # BLD AUTO: 3.3 10E6/UL (ref 3.8–5.2)
SODIUM SERPL-SCNC: 135 MMOL/L (ref 135–145)
WBC # BLD AUTO: 4.6 10E3/UL (ref 4–11)

## 2023-11-23 PROCEDURE — 250N000013 HC RX MED GY IP 250 OP 250 PS 637

## 2023-11-23 PROCEDURE — 200N000002 HC R&B ICU UMMC

## 2023-11-23 PROCEDURE — 250N000011 HC RX IP 250 OP 636: Performed by: NURSE PRACTITIONER

## 2023-11-23 PROCEDURE — 80053 COMPREHEN METABOLIC PANEL: CPT

## 2023-11-23 PROCEDURE — 250N000013 HC RX MED GY IP 250 OP 250 PS 637: Performed by: NURSE PRACTITIONER

## 2023-11-23 PROCEDURE — 258N000003 HC RX IP 258 OP 636: Performed by: INTERNAL MEDICINE

## 2023-11-23 PROCEDURE — 250N000013 HC RX MED GY IP 250 OP 250 PS 637: Performed by: PEDIATRICS

## 2023-11-23 PROCEDURE — 250N000009 HC RX 250: Performed by: INTERNAL MEDICINE

## 2023-11-23 PROCEDURE — 250N000011 HC RX IP 250 OP 636: Mod: JZ | Performed by: PEDIATRICS

## 2023-11-23 PROCEDURE — 250N000013 HC RX MED GY IP 250 OP 250 PS 637: Performed by: INTERNAL MEDICINE

## 2023-11-23 PROCEDURE — 84100 ASSAY OF PHOSPHORUS: CPT | Performed by: INTERNAL MEDICINE

## 2023-11-23 PROCEDURE — 250N000011 HC RX IP 250 OP 636: Mod: JZ | Performed by: NURSE PRACTITIONER

## 2023-11-23 PROCEDURE — 258N000003 HC RX IP 258 OP 636

## 2023-11-23 PROCEDURE — 85027 COMPLETE CBC AUTOMATED: CPT

## 2023-11-23 PROCEDURE — 250N000013 HC RX MED GY IP 250 OP 250 PS 637: Performed by: PHYSICIAN ASSISTANT

## 2023-11-23 PROCEDURE — 250N000011 HC RX IP 250 OP 636: Mod: JZ

## 2023-11-23 PROCEDURE — 83735 ASSAY OF MAGNESIUM: CPT | Performed by: INTERNAL MEDICINE

## 2023-11-23 PROCEDURE — 250N000011 HC RX IP 250 OP 636: Mod: JZ | Performed by: INTERNAL MEDICINE

## 2023-11-23 PROCEDURE — 99233 SBSQ HOSP IP/OBS HIGH 50: CPT | Performed by: STUDENT IN AN ORGANIZED HEALTH CARE EDUCATION/TRAINING PROGRAM

## 2023-11-23 PROCEDURE — 36415 COLL VENOUS BLD VENIPUNCTURE: CPT

## 2023-11-23 PROCEDURE — 36415 COLL VENOUS BLD VENIPUNCTURE: CPT | Performed by: INTERNAL MEDICINE

## 2023-11-23 RX ORDER — MAGNESIUM SULFATE HEPTAHYDRATE 40 MG/ML
2 INJECTION, SOLUTION INTRAVENOUS ONCE
Status: COMPLETED | OUTPATIENT
Start: 2023-11-23 | End: 2023-11-23

## 2023-11-23 RX ORDER — ACYCLOVIR 400 MG/1
400 TABLET ORAL EVERY 12 HOURS
Status: DISCONTINUED | OUTPATIENT
Start: 2023-11-23 | End: 2023-11-30 | Stop reason: HOSPADM

## 2023-11-23 RX ORDER — CYCLOBENZAPRINE HCL 10 MG
10 TABLET ORAL
Status: COMPLETED | OUTPATIENT
Start: 2023-11-23 | End: 2023-11-23

## 2023-11-23 RX ORDER — POTASSIUM CHLORIDE 7.45 MG/ML
10 INJECTION INTRAVENOUS
Status: COMPLETED | OUTPATIENT
Start: 2023-11-23 | End: 2023-11-23

## 2023-11-23 RX ADMIN — Medication 2.5 MG: at 09:00

## 2023-11-23 RX ADMIN — POTASSIUM CHLORIDE 10 MEQ: 10 INJECTION, SOLUTION INTRAVENOUS at 06:37

## 2023-11-23 RX ADMIN — Medication 1 CAPSULE: at 08:52

## 2023-11-23 RX ADMIN — Medication 1 CAPSULE: at 19:33

## 2023-11-23 RX ADMIN — OXYBUTYNIN CHLORIDE 10 MG: 10 TABLET, EXTENDED RELEASE ORAL at 08:52

## 2023-11-23 RX ADMIN — ONDANSETRON 4 MG: 2 INJECTION INTRAMUSCULAR; INTRAVENOUS at 21:56

## 2023-11-23 RX ADMIN — Medication 600 MG: at 08:52

## 2023-11-23 RX ADMIN — HEPARIN SODIUM 5000 UNITS: 5000 INJECTION, SOLUTION INTRAVENOUS; SUBCUTANEOUS at 21:35

## 2023-11-23 RX ADMIN — CHOLESTYRAMINE 4 G: 4 POWDER, FOR SUSPENSION ORAL at 08:53

## 2023-11-23 RX ADMIN — LATANOPROST 1 DROP: 50 SOLUTION OPHTHALMIC at 08:53

## 2023-11-23 RX ADMIN — Medication 2.5 MG: at 02:16

## 2023-11-23 RX ADMIN — HEPARIN SODIUM 5000 UNITS: 5000 INJECTION, SOLUTION INTRAVENOUS; SUBCUTANEOUS at 05:19

## 2023-11-23 RX ADMIN — CHLORHEXIDINE GLUCONATE 15 ML: 1.2 SOLUTION ORAL at 08:53

## 2023-11-23 RX ADMIN — CYCLOBENZAPRINE 10 MG: 10 TABLET, FILM COATED ORAL at 22:53

## 2023-11-23 RX ADMIN — BACLOFEN 20 MG: 10 TABLET ORAL at 21:32

## 2023-11-23 RX ADMIN — POTASSIUM CHLORIDE 10 MEQ: 10 INJECTION, SOLUTION INTRAVENOUS at 05:18

## 2023-11-23 RX ADMIN — CHOLESTYRAMINE 4 G: 4 POWDER, FOR SUSPENSION ORAL at 19:29

## 2023-11-23 RX ADMIN — ACETAMINOPHEN 1000 MG: 325 TABLET, FILM COATED ORAL at 18:45

## 2023-11-23 RX ADMIN — HEPARIN SODIUM 5000 UNITS: 5000 INJECTION, SOLUTION INTRAVENOUS; SUBCUTANEOUS at 13:54

## 2023-11-23 RX ADMIN — BACLOFEN 10 MG: 10 TABLET ORAL at 08:52

## 2023-11-23 RX ADMIN — LEVOTHYROXINE SODIUM 100 MCG: 100 TABLET ORAL at 09:00

## 2023-11-23 RX ADMIN — Medication 2.5 MG: at 18:45

## 2023-11-23 RX ADMIN — MULTIPLE VITAMINS W/ MINERALS TAB 1 TABLET: TAB at 19:33

## 2023-11-23 RX ADMIN — FAMOTIDINE 40 MG: 20 TABLET ORAL at 21:32

## 2023-11-23 RX ADMIN — ACYCLOVIR SODIUM 400 MG: 50 INJECTION, SOLUTION INTRAVENOUS at 09:00

## 2023-11-23 RX ADMIN — MAGNESIUM SULFATE HEPTAHYDRATE 2 G: 40 INJECTION, SOLUTION INTRAVENOUS at 05:19

## 2023-11-23 RX ADMIN — SODIUM PHOSPHATE, MONOBASIC, MONOHYDRATE AND SODIUM PHOSPHATE, DIBASIC, ANHYDROUS 15 MMOL: 142; 276 INJECTION, SOLUTION INTRAVENOUS at 06:38

## 2023-11-23 RX ADMIN — ACETAMINOPHEN 650 MG: 325 TABLET, FILM COATED ORAL at 09:00

## 2023-11-23 RX ADMIN — DULOXETINE 80 MG: 60 CAPSULE, DELAYED RELEASE ORAL at 08:52

## 2023-11-23 RX ADMIN — HYDROCHLOROTHIAZIDE 12.5 MG: 12.5 TABLET ORAL at 08:52

## 2023-11-23 RX ADMIN — ACYCLOVIR 400 MG: 400 TABLET ORAL at 21:32

## 2023-11-23 RX ADMIN — CEFTRIAXONE SODIUM 1 G: 1 INJECTION, POWDER, FOR SOLUTION INTRAMUSCULAR; INTRAVENOUS at 19:31

## 2023-11-23 ASSESSMENT — ACTIVITIES OF DAILY LIVING (ADL)
ADLS_ACUITY_SCORE: 55
ADLS_ACUITY_SCORE: 55
ADLS_ACUITY_SCORE: 59
ADLS_ACUITY_SCORE: 55
ADLS_ACUITY_SCORE: 55
ADLS_ACUITY_SCORE: 59
ADLS_ACUITY_SCORE: 55

## 2023-11-23 NOTE — PLAN OF CARE
"Goal Outcome Evaluation:  Patient complaining of pain throughout shift, stating \"Ow, Ow, Ow\" frequently. PRN Tylenol, Baclofen, and Roxicodone used with some improvement in symptoms noted. Patient no longer stating this at rest and only when extremities are moved or she is turned. She continues to experience word finding difficulties and becomes easily frustrated. Some improvement noted as shift progressed. She is able to make some of her needs known but often gets stuck repetitively stating \" I want.... I want...\" but not able to finish sentence. At other times she is able to say full sentences that make her needs known, such as \" I want to go home\", and \"I don't feel well\". Encouraged patient to take her time with finding words. Currently resting comfortably with stable vital signs.                       " Patient arrived on unit for glucagon stimulation test; height and weight obtained, orders released. Mom and Dad accompanied patient. Admission information and assessment/vital signs completed. 22g PIV placed in left AC. All labs drawn and sent to lab, and medication was administered per MD order. Patient tolerated medication well without any complications. Patient c/o of some itching and mild redness to PIV site; resolved with a cold pack in place for about 5 minutes. Tolerated a snack and water without any nausea or vomiting. PIV removed, tip intact. Patient sent home with parents in NAD.

## 2023-11-23 NOTE — PLAN OF CARE
ICU End of Shift Summary. See flowsheets for vital signs and detailed assessment.    Changes this shift: Pt drowsy and lethargic throughout shift, oriented to person and intermittently to place. Continues to have word finding difficulty. Endorses RLE pain that is controlled with PRN oxy and tylenol. Pt prefers to lay on left side. VSS on RA. Small BM, adequate output via Verduzco. Very poor PO intake, pt frequently encouraged to drink water and assisted with pureed diet however refusing most attempts to feed pt. Took meds crushed in apple sauce. Cast to RLE.     Plan: Transfer to medicine floor as able.     Problem: Adult Inpatient Plan of Care  Goal: Optimal Comfort and Wellbeing  Intervention: Monitor Pain and Promote Comfort  Recent Flowsheet Documentation  Taken 11/23/2023 0800 by Mei Gomez, RN  Pain Management Interventions: medication (see MAR)

## 2023-11-23 NOTE — PROGRESS NOTES
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 On 11/17 a rapid response was called as patient was found to be obtunded and transferred to the ICU after intubation for airway protection. Acute mental status change and respiratory failure thought likely multifactorial including narcotic pain medication, acute infection (UTI), and acute on chronic hypercapneic respiratory failure. Extubated to room air 11/21, and transferred to the general medical floor 11/22.     PLAN:     # Toxic metabolic encephalopathy, multifactorial including hypercapnea, infection, and medication toxicity   # C/f non-convulsive seizures (ruled out)  Rapid response was called 11/17 as patient was found to be obtunded and non-arousable. Attempts to arousing patient including Narcan were unsuccessful and was later transferred to the ICU. With concerns for protecting her airway, patient was intubated. Likely a combination of acute on chronic hypercapnea, opioid toxicity, and infection from UTI. Head CT and Brain MRI on 11/19 without acute pathology. Mental status improving but not yet back to baseline.   - Delirium precautions   - Hold pta Nuvigil for sleep, to reduce AMS for now  - Neurology consulted, now signed off. They did not appreciate any seizures on EEG. If mental status does not improve they can be reconsulted, and recommend repeat MRI of the head in one week to evaluate for anoxic brain injury (though unlikely)   - EEG 11/20: When sedation was in use, moderate to severe  encephalopathy noted. When sedation was not in use, moderate diffuse encephalopathy was noted.  - Careful use of opiate and other sedating medications     # Progressive Multiple Sclerosis (wheelchair bound)   Patient has longstanding diagnosis of progressive MS now wheelchair bound. Follows with Immunology and Neurology outpatient. Last brain MRI in 2017 showing suspicion of demyelinating disease. Repeat brain MRI on 11/19/23 showed scattered T2 hyperintense foci throughout the subcorical and periventricular white matter grossly unchanged when compared to MRI on 11/1/2017. Unsure of exact biologics patient has was on, appears she has received Lemtrada (2017, 2019), and other past DMTs included Betaseron, Aubagio.   - Reduce PTA  baclofen 20 mg ->10mg daily, and 30 mg -> 20mg at bedtime given AMS and DENNISE  - Hold pta oxybutynin for neurogenic bladder 2/2 MS    # Two outpatient mechanical falls   # Right distal femur fracture and left malleolus fracture  # Osteoporosis   - Orthopedics following: right leg long leg cast to be kept clean dry and intact. Left leg cam boot once no longer intubated/sedated  - Monitor pulses  - Oxycodone 2.5 mg Q6 hours, but minimize use to avoid oversedation  - Baclofen dose reduced per Neurology recommendations     # Major Depressive Disorder   - Continue Cymbalta     # Sleep apnea 2/2 MS   Appears patient follows with sleep clinic/doctor and found to have sleep apnea 2/2 her MS   - Wears a CPAP at home   - Wear CPAP at HS now that she is extubated     # Tobacco use; current smoker      # Hypercapnia - Resolved  # History of pulmonary nodules   Recent CT Chest from 6/16/23 shows right lower lobe nodule has resolved with a stable left lower lobe nodule.   - Now on yearly chest CT follow-ups      # Hx of hypertension  Patient has been on/off levophed. Has received a total of 1.5L of crystalloid.  - 11/22 restarted PTA hydrochlorothiazide     # GERD  # risk for malnutrition  - pantoprazole 40  mg suspension daily  - RD consulted, following   - Speech consulted, following  - Cleared for pureed diet with thin liquids      # DENNISE (resolved)  # CKD   # Recurrent UTI   # Hypophosphatemia  Patients baseline creatinine around 0.96-1.08. DENNISE with CKD concerns for any sedating related medications could have caused patient to become obtunded. Confirmed UTI as below; previous recurrent UTI with klebsiella pneuomiae as well.   - Reduce nephrotoxic agents as able   - Monitor I/Os   - UC growing Klebsiella pneumoniae 11/19  - Continue ceftriaxone 1g q24h, treat for 7 days total  - Daily BMP  - Mg, K, Phos protocols     # Hx Graves s/p thyroid ablation   - TSH 0.64   - Continue pta synthroid 100 mcg daily   - Hypoglycemia protocol     # Herpes simplex virus; prophylaxis on Acyclovir   - Continue pta Acyclovir 400 mg (Changed to IV for now and can change back to tablet once swallow is evaluated and passed)     # Recurrent UTIs  Suspect AMS related to recurrent UTI  - Stopped PTA macrobid  - Continue ceftriaxone 1g q24h X 7 days  - UC with Klebsiella pneumoniae 10/7 and 11/19  - NGTD 11/19 on blood cx      # Anemia of chronic disease 2/2 CKD   # Thrombocytopenia   Hgb 6.2 11/19. Was given 1 unit PRBCs.   - hemoglobin stable  - Hold pta ferrous sulfate  - Daily CBC      # Ductal carcinoma in situ of left breast   - Follows with outpatient radiation/oncology and medical oncology annually            Diet: Snacks/Supplements Adult: Ensure Enlive; With Meals  Room Service  Pureed Diet (level 4) Thin Liquids (level 0)    DVT Prophylaxis: Heparin SQ  Verduzco Catheter: PRESENT, indication: Deep Sedation/Paralysis;Strict 1-2 Hour I&O, Strict 1-2 Hour I&O  Lines: None     Cardiac Monitoring: ACTIVE order. Indication: ICU  Code Status: Full Code      Clinically Significant Risk Factors              # Hypoalbuminemia: Lowest albumin = 2.7 g/dL at 11/21/2023  4:59 AM, will monitor as appropriate                # Financial/Environmental  Concerns: none         Disposition Plan             Domonique Lao MD  Hospitalist Service, GOLD TEAM 21  [facility]  Securely message with [employer] (more info)  Text page via [employer] Paging/Directory   See signed in provider for up to date coverage information  ______________________________________________________________________    Interval History   Mental status continues to improve, although not at baseline. Continues to have word finding and memory difficulty. Easily reassured and pain improves with pain medication.      Physical Exam   Vital Signs: Temp: 97.2  F (36.2  C) Temp src: Axillary BP: (!) 154/82 Pulse: 93   Resp: 15 SpO2: 98 % O2 Device: Nasal cannula    Weight: 134 lbs 11.22 oz    General: Sleeping. Wakes to voice. Initially distressed and tearful, became calm with reassurance and support.  Mouth: edentulous   CV: Normal rate and rhythm, normal S1 and S2. No murmurs, rubs, gallops. Radial pulses 2+ and symmetric.  Respiratory: Lungs clear to auscultation bilaterally. No wheezing, rhonchi, or rales.  Abdomen: Soft, non-distended. Non-tender to palpation. No rebound tenderness or guarding. +BS.   MSK: RLE ace wrapped. Joint contractures.   Extremities: Trace edema of the feet bilaterally  Skin: Warm, dry. No rash on visible skin.   Neuro: Alert. Face symmetric, speech slow but intact.   Psych: Tearful, anxious. Calm when sleeping.       Medical Decision Making       50 MINUTES SPENT BY ME on the date of service doing chart review, history, exam, documentation & further activities per the note.      Data     I have personally reviewed the following data over the past 24 hrs:    4.6  \   9.7 (L)   / 175     135 103 9.5 /  88   3.8 23 0.64 \     ALT: 19 AST: 23 AP: 55 TBILI: 0.4   ALB: 2.9 (L) TOT PROTEIN: 5.8 (L) LIPASE: N/A       Imaging results reviewed over the past 24 hrs:   No results found for this or any previous visit (from the past 24 hour(s)).

## 2023-11-23 NOTE — PROGRESS NOTES
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 On 11/17 a rapid response was called as patient was found to be obtunded and transferred to the ICU after intubation for airway protection. Acute mental status change and respiratory failure thought likely multifactorial including narcotic pain medication, acute infection (UTI), and acute on chronic hypercapneic respiratory failure. Extubated to room air 11/21, and transferred to the general medical floor 11/22.\"     Speech Therapy was consulted to evaluate swallow function in the setting of respiratory failure requiring recent intubation and advanced MS. Patient was intubated 11/18/23 to 11/21/23.     General Observations Patient alert but confused. Intermittent word-finding difficulty noted. Patient cooperative but perseverating on wanting to lay down in bed.   Type of Evaluation   Type of Evaluation Swallow Evaluation   Oral Motor   Oral Musculature generally intact   Mucosal Quality adequate   Dentition (Oral Motor)   Comment, Dentition (Oral Motor) Patient has upper and lower dentures. These were inserted prior to trial of a solid texture.   Dentition (Oral Motor) edentulous;dental appliance/dentures   Dental Appliance/Denture (Oral Motor) upper and lower;dentures, full   Facial Symmetry (Oral Motor)   Facial " Symmetry (Oral Motor) WNL   Lip Function (Oral Motor)   Lip Range of Motion (Oral Motor) WNL   Lip Strength (Oral Motor) WNL   Tongue Function (Oral Motor)   Tongue ROM (Oral Motor) WNL   Jaw Function (Oral Motor)   Jaw Function (Oral Motor) WNL   Cough/Swallow/Gag Reflex (Oral Motor)   Volitional Throat Clear/Cough (Oral Motor) unable/difficult to assess   Volitional Swallow (Oral Motor) unable/difficult to assess   Comment, Cough/Swallow/Gag Reflex (Oral Motor) Patient unable to follow commands to complete a volitional swallow or volitional throat clear.   Vocal Quality/Secretion Management (Oral Motor)   Vocal Quality (Oral Motor) WNL   Secretion Management (Oral Motor) WNL   Comment, Vocal Quality/Secretion Management (Oral Motor) Patient's vocal quality was clear. Slight hoarseness noted.   General Swallowing Observations   Past History of Dysphagia None per review of EMR. Patient also denies any history of swallowing difficulty.   Respiratory Support room air   Current Diet/Method of Nutritional Intake (General Swallowing Observations, NIS) NPO   Swallowing Evaluation Clinical swallow evaluation   Clinical Swallow Evaluation   Feeding Assistance set up only required   Clinical Swallow Evaluation Textures Trialed thin liquids;pureed;solid foods   Clinical Swallow Eval: Thin Liquid Texture Trial   Mode of Presentation, Thin Liquids spoon;fed by clinician;cup;straw;self-fed   Volume of Liquid or Food Presented 3 ice chips, >4 oz water   Oral Phase of Swallow WFL   Pharyngeal Phase of Swallow intact  (No overt s/sx of aspiration)   Diagnostic Statement Subjectively, swallow response appeared timely. No overt clinical s/sx of aspiration observed.   Clinical Swallow Evaluation: Puree Solid Texture Trial   Mode of Presentation, Puree spoon;fed by clinician   Volume of Puree Presented 4 bites   Oral Phase, Puree WFL   Pharyngeal Phase, Puree intact  (No overt s/sx of aspiration)   Diagnostic Statement Oropharyngeal  swallow function was WNL with puree consistency. No overt clinical s/sx of aspiration observed.   Clinical Swallow Evaluation: Solid Food Texture Trial   Mode of Presentation self-fed   Volume Presented 2 bites   Oral Phase impaired mastication;delayed AP movement;effortful AP movement;residue in oral cavity   Oral Residue mid posterior tongue   Pharyngeal Phase intact  (No overt s/sx of aspiration)   Diagnostic Statement Patient demonstrated prolonged mastication, effortful A-P bolus transit, and incomplete oral clearance. Lower denture is loose-fitting, which appeared to make chewing and bolus prep/transit difficult. Moderate stasis noted on mid posterior portion of tongue. Multiple liquid washes needed to clear this. No overt clinical s/sx of aspiration observed.   Esophageal Phase of Swallow   Patient reports or presents with symptoms of esophageal dysphagia No   Swallowing Recommendations   Diet Consistency Recommendations pureed (level 4);thin liquids (level 0)   Supervision Level for Intake 1:1 supervision needed   Mode of Delivery Recommendations bolus size, small;slow rate of intake   Swallowing Maneuver Recommendations alternate food and liquid intake   Monitoring/Assistance Required (Eating/Swallowing) stop eating activities when fatigue is present;monitor for cough or change in vocal quality with intake   Recommended Feeding/Eating Techniques (Swallow Eval) maintain upright sitting position for eating;encourage use of dentures;set-up and prepare tray;other (see comments)  (Assist w/ feeding as needed)   Medication Administration Recommendations, Swallowing (SLP) Give meds singly, mixed in applesauce or pudding.   Instrumental Assessment Recommendations instrumental evaluation not recommended at this time   General Therapy Interventions   Planned Therapy Interventions Dysphagia Treatment   Dysphagia treatment Modified diet education;Instruction of safe swallow strategies   Clinical Impression   Criteria  for Skilled Therapeutic Interventions Met (SLP Eval) Yes, treatment indicated   SLP Diagnosis Dysphagia  (In the setting of recent respiratory failure requiring intubation)   Risks & Benefits of therapy have been explained evaluation/treatment results reviewed;care plan/treatment goals reviewed;current/potential barriers reviewed;participants voiced agreement with care plan;participants included;patient  (Suspect limited true comprehension given current confusion)   Clinical Impression Comments   Clinical Swallow Evaluation completed per provider order. Patient presents with mild-moderate oral dysphagia characterized by prolonged bolus prep/mastication, effortful & delayed A-P transit, and incomplete oral clearance with a regular texture solid. No oral dysphagia observed with trials of thin liquid and puree consistency. There was no direct evidence of pharyngeal dysphagia during this evaluation.     At this time, patient appears appropriate for diet of Pureed tetures (Level 4) and thin liquids. Due to current confusion, recommend 1:1 supervision during meals to ensure that she adheres to the safe swallowing strategies indicated above. Speech Therapy will follow to monitor diet tolerance, reinforce strategies with patient & caregivers, and trial advanced diet textures as cognition clears.     SLP Total Evaluation Time   Eval: oral/pharyngeal swallow function, clinical swallow Minutes (09577) 17   SLP Goals   Therapy Frequency (SLP Eval) 5 times/week   SLP Predicted Duration/Target Date for Goal Attainment 12/06/23   SLP Goals Swallow   SLP: Safely tolerate diet without signs/symptoms of aspiration Regular diet;Thin liquids;With use of swallow precautions;Independently   SLP Discharge Planning   SLP Plan F/U re: diet tolerance. Trial advanced textures as cognition clears.   SLP Discharge Recommendation Transitional Care Facility   SLP Rationale for DC Rec Swallow function is below baseline   SLP Brief overview of  current status  Recommend diet of Pureed textures and thin liquids. Patient must be alert and sitting fully upright for all intake. 1:1 supervision with meals to ensure that patient adheres to safe swallowing strategies. If s/sx of aspiration are observed, change to NPO. Speech Therapy will continue to follow for dysphagia management.   Total Session Time   Total Session Time (sum of timed and untimed services) 17

## 2023-11-23 NOTE — CARE PLAN
Pt alert, sekou orientation due to pt's word finding difficulty, sating >98% in RA, had 2 small BM,cazares with good output, RLE with cast, edema on LLE, taking pills crushed with apple sauce, turned q2h

## 2023-11-24 ENCOUNTER — APPOINTMENT (OUTPATIENT)
Dept: PHYSICAL THERAPY | Facility: CLINIC | Age: 68
DRG: 533 | End: 2023-11-24
Attending: PEDIATRICS
Payer: MEDICARE

## 2023-11-24 ENCOUNTER — APPOINTMENT (OUTPATIENT)
Dept: SPEECH THERAPY | Facility: CLINIC | Age: 68
DRG: 533 | End: 2023-11-24
Attending: PEDIATRICS
Payer: MEDICARE

## 2023-11-24 LAB
ALBUMIN SERPL BCG-MCNC: 2.9 G/DL (ref 3.5–5.2)
ALP SERPL-CCNC: 52 U/L (ref 40–150)
ALT SERPL W P-5'-P-CCNC: 15 U/L (ref 0–50)
ANION GAP SERPL CALCULATED.3IONS-SCNC: 6 MMOL/L (ref 7–15)
AST SERPL W P-5'-P-CCNC: 22 U/L (ref 0–45)
BILIRUB SERPL-MCNC: 0.3 MG/DL
BUN SERPL-MCNC: 10.3 MG/DL (ref 8–23)
CALCIUM SERPL-MCNC: 8.6 MG/DL (ref 8.8–10.2)
CHLORIDE SERPL-SCNC: 104 MMOL/L (ref 98–107)
CREAT SERPL-MCNC: 0.72 MG/DL (ref 0.51–0.95)
DEPRECATED HCO3 PLAS-SCNC: 25 MMOL/L (ref 22–29)
EGFRCR SERPLBLD CKD-EPI 2021: >90 ML/MIN/1.73M2
ERYTHROCYTE [DISTWIDTH] IN BLOOD BY AUTOMATED COUNT: 16.1 % (ref 10–15)
GLUCOSE BLDC GLUCOMTR-MCNC: 103 MG/DL (ref 70–99)
GLUCOSE BLDC GLUCOMTR-MCNC: 81 MG/DL (ref 70–99)
GLUCOSE BLDC GLUCOMTR-MCNC: 86 MG/DL (ref 70–99)
GLUCOSE SERPL-MCNC: 82 MG/DL (ref 70–99)
HCT VFR BLD AUTO: 29.3 % (ref 35–47)
HGB BLD-MCNC: 9.6 G/DL (ref 11.7–15.7)
MAGNESIUM SERPL-MCNC: 1.6 MG/DL (ref 1.7–2.3)
MCH RBC QN AUTO: 29.4 PG (ref 26.5–33)
MCHC RBC AUTO-ENTMCNC: 32.8 G/DL (ref 31.5–36.5)
MCV RBC AUTO: 90 FL (ref 78–100)
PHOSPHATE SERPL-MCNC: 3 MG/DL (ref 2.5–4.5)
PLATELET # BLD AUTO: 174 10E3/UL (ref 150–450)
POTASSIUM SERPL-SCNC: 4.2 MMOL/L (ref 3.4–5.3)
PROT SERPL-MCNC: 5.7 G/DL (ref 6.4–8.3)
RBC # BLD AUTO: 3.26 10E6/UL (ref 3.8–5.2)
SODIUM SERPL-SCNC: 135 MMOL/L (ref 135–145)
WBC # BLD AUTO: 4.8 10E3/UL (ref 4–11)

## 2023-11-24 PROCEDURE — 999N000127 HC STATISTIC PERIPHERAL IV START W US GUIDANCE

## 2023-11-24 PROCEDURE — 250N000013 HC RX MED GY IP 250 OP 250 PS 637

## 2023-11-24 PROCEDURE — 200N000002 HC R&B ICU UMMC

## 2023-11-24 PROCEDURE — 36415 COLL VENOUS BLD VENIPUNCTURE: CPT

## 2023-11-24 PROCEDURE — 80053 COMPREHEN METABOLIC PANEL: CPT

## 2023-11-24 PROCEDURE — 97161 PT EVAL LOW COMPLEX 20 MIN: CPT | Mod: GP

## 2023-11-24 PROCEDURE — 97530 THERAPEUTIC ACTIVITIES: CPT | Mod: GP

## 2023-11-24 PROCEDURE — 250N000011 HC RX IP 250 OP 636: Mod: JZ | Performed by: NURSE PRACTITIONER

## 2023-11-24 PROCEDURE — 250N000013 HC RX MED GY IP 250 OP 250 PS 637: Performed by: INTERNAL MEDICINE

## 2023-11-24 PROCEDURE — 83735 ASSAY OF MAGNESIUM: CPT | Performed by: INTERNAL MEDICINE

## 2023-11-24 PROCEDURE — 250N000013 HC RX MED GY IP 250 OP 250 PS 637: Performed by: NURSE PRACTITIONER

## 2023-11-24 PROCEDURE — 92526 ORAL FUNCTION THERAPY: CPT | Mod: GN | Performed by: SPEECH-LANGUAGE PATHOLOGIST

## 2023-11-24 PROCEDURE — L4396 STATIC OR DYNAMI AFO PRE CST: HCPCS

## 2023-11-24 PROCEDURE — 250N000011 HC RX IP 250 OP 636: Performed by: NURSE PRACTITIONER

## 2023-11-24 PROCEDURE — G0463 HOSPITAL OUTPT CLINIC VISIT: HCPCS

## 2023-11-24 PROCEDURE — 250N000013 HC RX MED GY IP 250 OP 250 PS 637: Performed by: STUDENT IN AN ORGANIZED HEALTH CARE EDUCATION/TRAINING PROGRAM

## 2023-11-24 PROCEDURE — 84100 ASSAY OF PHOSPHORUS: CPT | Performed by: PEDIATRICS

## 2023-11-24 PROCEDURE — 250N000013 HC RX MED GY IP 250 OP 250 PS 637: Performed by: PEDIATRICS

## 2023-11-24 PROCEDURE — 85027 COMPLETE CBC AUTOMATED: CPT

## 2023-11-24 PROCEDURE — 99232 SBSQ HOSP IP/OBS MODERATE 35: CPT | Performed by: STUDENT IN AN ORGANIZED HEALTH CARE EDUCATION/TRAINING PROGRAM

## 2023-11-24 PROCEDURE — 250N000011 HC RX IP 250 OP 636: Mod: JZ | Performed by: PEDIATRICS

## 2023-11-24 RX ORDER — BACLOFEN 10 MG/1
10 TABLET ORAL ONCE
Status: COMPLETED | OUTPATIENT
Start: 2023-11-24 | End: 2023-11-24

## 2023-11-24 RX ORDER — BACLOFEN 20 MG/1
20 TABLET ORAL DAILY
Status: DISCONTINUED | OUTPATIENT
Start: 2023-11-25 | End: 2023-11-30 | Stop reason: HOSPADM

## 2023-11-24 RX ORDER — MAGNESIUM SULFATE HEPTAHYDRATE 40 MG/ML
2 INJECTION, SOLUTION INTRAVENOUS ONCE
Qty: 50 ML | Refills: 0 | Status: COMPLETED | OUTPATIENT
Start: 2023-11-24 | End: 2023-11-24

## 2023-11-24 RX ADMIN — DULOXETINE 80 MG: 60 CAPSULE, DELAYED RELEASE ORAL at 09:37

## 2023-11-24 RX ADMIN — LATANOPROST 1 DROP: 50 SOLUTION OPHTHALMIC at 09:38

## 2023-11-24 RX ADMIN — Medication 1 CAPSULE: at 21:03

## 2023-11-24 RX ADMIN — MULTIPLE VITAMINS W/ MINERALS TAB 1 TABLET: TAB at 21:03

## 2023-11-24 RX ADMIN — HEPARIN SODIUM 5000 UNITS: 5000 INJECTION, SOLUTION INTRAVENOUS; SUBCUTANEOUS at 21:03

## 2023-11-24 RX ADMIN — LEVOTHYROXINE SODIUM 100 MCG: 100 TABLET ORAL at 06:45

## 2023-11-24 RX ADMIN — Medication 2.5 MG: at 06:45

## 2023-11-24 RX ADMIN — ACYCLOVIR 400 MG: 400 TABLET ORAL at 21:03

## 2023-11-24 RX ADMIN — ACYCLOVIR 400 MG: 400 TABLET ORAL at 09:36

## 2023-11-24 RX ADMIN — CHOLESTYRAMINE 4 G: 4 POWDER, FOR SUSPENSION ORAL at 21:04

## 2023-11-24 RX ADMIN — Medication 1 CAPSULE: at 09:37

## 2023-11-24 RX ADMIN — CEFTRIAXONE SODIUM 1 G: 1 INJECTION, POWDER, FOR SOLUTION INTRAMUSCULAR; INTRAVENOUS at 21:03

## 2023-11-24 RX ADMIN — ACETAMINOPHEN 1000 MG: 325 TABLET, FILM COATED ORAL at 06:01

## 2023-11-24 RX ADMIN — ACETAMINOPHEN 1000 MG: 325 TABLET, FILM COATED ORAL at 14:17

## 2023-11-24 RX ADMIN — OXYBUTYNIN CHLORIDE 10 MG: 10 TABLET, EXTENDED RELEASE ORAL at 09:38

## 2023-11-24 RX ADMIN — BACLOFEN 30 MG: 20 TABLET ORAL at 21:06

## 2023-11-24 RX ADMIN — CHOLESTYRAMINE 4 G: 4 POWDER, FOR SUSPENSION ORAL at 09:37

## 2023-11-24 RX ADMIN — Medication 1 MG: at 21:03

## 2023-11-24 RX ADMIN — FAMOTIDINE 40 MG: 20 TABLET ORAL at 21:03

## 2023-11-24 RX ADMIN — BACLOFEN 10 MG: 10 TABLET ORAL at 12:10

## 2023-11-24 RX ADMIN — HYDROCHLOROTHIAZIDE 12.5 MG: 12.5 TABLET ORAL at 09:44

## 2023-11-24 RX ADMIN — Medication 2.5 MG: at 01:07

## 2023-11-24 RX ADMIN — HEPARIN SODIUM 5000 UNITS: 5000 INJECTION, SOLUTION INTRAVENOUS; SUBCUTANEOUS at 14:17

## 2023-11-24 RX ADMIN — HEPARIN SODIUM 5000 UNITS: 5000 INJECTION, SOLUTION INTRAVENOUS; SUBCUTANEOUS at 05:45

## 2023-11-24 RX ADMIN — MAGNESIUM SULFATE HEPTAHYDRATE 2 G: 40 INJECTION, SOLUTION INTRAVENOUS at 06:44

## 2023-11-24 RX ADMIN — Medication 600 MG: at 09:37

## 2023-11-24 RX ADMIN — BACLOFEN 10 MG: 10 TABLET ORAL at 09:36

## 2023-11-24 RX ADMIN — ACETAMINOPHEN 650 MG: 325 TABLET, FILM COATED ORAL at 21:03

## 2023-11-24 RX ADMIN — Medication 2.5 MG: at 16:41

## 2023-11-24 ASSESSMENT — ACTIVITIES OF DAILY LIVING (ADL)
ADLS_ACUITY_SCORE: 59
ADLS_ACUITY_SCORE: 59
ADLS_ACUITY_SCORE: 55
ADLS_ACUITY_SCORE: 59
ADLS_ACUITY_SCORE: 55
ADLS_ACUITY_SCORE: 59
ADLS_ACUITY_SCORE: 59
ADLS_ACUITY_SCORE: 55
ADLS_ACUITY_SCORE: 59

## 2023-11-24 NOTE — PROGRESS NOTES
Care Management Follow Up    Length of Stay (days): 7    Expected Discharge Date:       Concerns to be Addressed: discharge planning     Patient plan of care discussed at interdisciplinary rounds: Yes    Anticipated Discharge Disposition:       Anticipated Discharge Services:  LTC v/ home w/ assist and home PT   Anticipated Discharge DME:  unknown    Patient/family educated on Medicare website which has current facility and service quality ratings:  N/A  Education Provided on the Discharge Plan:  N/A  Patient/Family in Agreement with the Plan: yes    Referrals Placed by CM/SW:  N/A  Private pay costs discussed: Not applicable    Additional Information:    Writer received call this morning from nurse with an update about the patient. Provider had requested that writer and provider meet with patient's , Manuel, when he visits. Writer was awaiting a call from the provider.    Writer did not receive call from provider. Nurse notified writer that Manuel was available in the room. Writer went to room and met patient's daughter and friend. They stated that Manuel had just left.    Writer attempted calling the patient on his phone and left a voicemail.    Awaiting call back.    Elly Pulliam, MSN, APRN, AGCNS-BC - RN Care Coordinator  [facility] [phone]    SEARCHABLE in [facility] - search CARE COORDINATOR     For Weekend & Holiday on call RN Care Coordinator:  (Tasks: Home care, home infusion, medical equipment, transportation, IMM & GONZALEZ forms, etc.)  Reading (0800 - 1630) Saturday and Sunday    Units: 5A, 5B, & 5C: [phone]    Units: 6B, 6C, 6D: [phone]    Units: 7A, 7B, 7C, 7D: [phone]    Units: 6A/ICU : [phone]     [facility] ([phone]) Saturday and Sunday    Units: 6 Med/Surg, 8A, 10 ICU, & Pediatric Units: [phone]    Units: 5 Ortho, 5Med/Surg & WB ED: [phone]

## 2023-11-24 NOTE — PLAN OF CARE
10A ICU End of Shift Summary.     Neuro: A&O x1-2, oriented to self and occasionally time or situation. Confused.   Cardiac: VSS  Respiratory: O2 sats stable on room air. Clear diminished lung sounds throughout.  GI/: 1 loose BM this shift. Verduzco in place draining adequate amount of clear yellow urine.  Diet/appetite: Pureed diet, thin liquids. Poor appetite. Takes pills crushed in apple sauce or pudding.  Pain: Pt reports 10/10 pain to left leg and right hip, flexeril given once this shift for cramping pain to left leg. Oxycodone given x2 and tylenol x1 with good relief.  Skin: Cast to right leg. Mepilex to pressure injury to L heel. Scattered bruising.  LDA's: Extended dwell to RFA.  Activities: W/C bound at baseline, up to chair with ceiling lift. Q2h turns in bed.    See flowsheets for vital signs and detailed assessment.

## 2023-11-24 NOTE — PROGRESS NOTES
Paged hospitalist about patient's pain level.  Bacolofen increased back to home level.  Discussed with her that pt still has cazares but may soil the cast if it is removed.  She will assess when she comes to the room.  Paged her when  arrived.   has many concerns regarding discharge.   Jacob Block RN

## 2023-11-24 NOTE — PROGRESS NOTES
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 On 11/17 a rapid response was called as patient was found to be obtunded and transferred to the ICU after intubation for airway protection. Acute mental status change and respiratory failure thought likely multifactorial including narcotic pain medication, acute infection (UTI), and acute on chronic hypercapneic respiratory failure. Extubated to room air 11/21, and transferred to the general medical floor 11/22.     PLAN:     # Toxic metabolic encephalopathy, multifactorial including hypercapnea, infection, and medication toxicity, improving   # C/f non-convulsive seizures (ruled out)  Rapid response was called 11/17 as patient was found to be obtunded and non-arousable. Attempts to arousing patient including Narcan were unsuccessful and was later transferred to the ICU. With concerns for protecting her airway, patient was intubated. Likely a combination of acute on chronic hypercapnea, opioid toxicity, and infection from UTI. Head CT and Brain MRI on 11/19 without acute pathology. Mental status improving but not yet back to baseline.   - Delirium precautions   - Hold pta Nuvigil for sleep, to reduce AMS for now  - Neurology consulted, now signed off. They did not appreciate any seizures on EEG. If mental status does not improve they can be reconsulted, and recommend repeat MRI of the head in one week to evaluate for anoxic brain injury (though unlikely)   - EEG 11/20: When sedation was in use, moderate to  severe encephalopathy noted. When sedation was not in use, moderate diffuse encephalopathy was noted.  - Careful use of opiate and other sedating medications     # Progressive Multiple Sclerosis (wheelchair bound)   Patient has longstanding diagnosis of progressive MS now wheelchair bound. Follows with Immunology and Neurology outpatient. Last brain MRI in 2017 showing suspicion of demyelinating disease. Repeat brain MRI on 11/19/23 showed scattered T2 hyperintense foci throughout the subcorical and periventricular white matter grossly unchanged when compared to MRI on 11/1/2017. Unsure of exact biologics patient has was on, appears she has received Lemtrada (2017, 2019), and other past DMTs included Betaseron, Aubagio.   - Resume PTA dose of Baclofen 20 mg QAM and 30 mg QHS  - Hold pta oxybutynin for neurogenic bladder 2/2 MS    # Two outpatient mechanical falls   # Right distal femur fracture and left malleolus fracture  # Osteoporosis   - Orthopedics following  - NWB on bilateral lower extremities  - R leg long cast, L leg PRAFO   - Monitor pulses  - Oxycodone 2.5 mg Q6 hours, monitor for over sedation  - PT consulted     # Major Depressive Disorder   - Continue Cymbalta     # Sleep apnea 2/2 MS   Appears patient follows with sleep clinic/doctor and found to have sleep apnea 2/2 her MS   - Wears a CPAP at home   - Wear CPAP at HS now that she is extubated     # Tobacco use; current smoker      # Hypercapnia - Resolved  # History of pulmonary nodules   Recent CT Chest from 6/16/23 shows right lower lobe nodule has resolved with a stable left lower lobe nodule.   - Now on yearly chest CT follow-ups      # Hx of hypertension  Patient has been on/off levophed. Has received a total of 1.5L of crystalloid.  - 11/22 restarted PTA hydrochlorothiazide     # GERD  # risk for malnutrition  - pantoprazole 40 mg suspension daily  - RD consulted, following   - Speech consulted, following  - Cleared for pureed diet with thin  liquids      # DENNISE (resolved)  # CKD   # Recurrent UTI   # Hypophosphatemia  Patients baseline creatinine around 0.96-1.08. DENNISE with CKD concerns for any sedating related medications could have caused patient to become obtunded. Confirmed UTI as below; previous recurrent UTI with klebsiella pneuomiae as well.   - Reduce nephrotoxic agents as able   - Monitor I/Os   - UC growing Klebsiella pneumoniae 11/19  - Continue ceftriaxone 1g q24h, treat for 7 days total  - Daily BMP  - Mg, K, Phos protocols     # Hx Graves s/p thyroid ablation   - TSH 0.64   - Continue pta synthroid 100 mcg daily   - Hypoglycemia protocol     # Herpes simplex virus; prophylaxis on Acyclovir   - Continue pta Acyclovir 400 mg (Changed to IV for now and can change back to tablet once swallow is evaluated and passed)     # Recurrent UTIs  Suspect AMS related to recurrent UTI  - Stopped PTA macrobid  - Continue ceftriaxone 1g q24h X 7 days  - UC with Klebsiella pneumoniae 10/7 and 11/19  - NGTD 11/19 on blood cx      # Anemia of chronic disease 2/2 CKD   # Thrombocytopenia   Hgb 6.2 11/19. Was given 1 unit PRBCs.   - hemoglobin stable  - Hold pta ferrous sulfate  - Daily CBC      # Ductal carcinoma in situ of left breast   - Follows with outpatient radiation/oncology and medical oncology annually            Diet: Snacks/Supplements Adult: Ensure Enlive; With Meals  Room Service  Combination Diet Regular Diet; Thin Liquids (level 0)    DVT Prophylaxis: Heparin SQ  Verduzco Catheter: Not present  Lines: None     Cardiac Monitoring: None  Code Status: Full Code      Clinically Significant Risk Factors            # Hypomagnesemia: Lowest Mg = 1.6 mg/dL in last 2 days, will replace as needed   # Hypoalbuminemia: Lowest albumin = 2.7 g/dL at 11/21/2023  4:59 AM, will monitor as appropriate                # Financial/Environmental Concerns: none         Disposition Plan             Domonique Lao MD  Hospitalist Service, GOLD TEAM [address] [place] [address]  [facility]  Securely message with [employer] (more info)  Text page via [facility] Paging/Directory   See signed in provider for up to date coverage information  ______________________________________________________________________    Interval History   Mental status continues to improve. Having painful spasms in leg. Also asking for Verduzco to be removed.    Met with patient's  and close friend at bedside to give updates and answer questions.     Physical Exam   Vital Signs: Temp: 98.3  F (36.8  C) Temp src: Oral BP: (!) 147/67 Pulse: 77   Resp: 13 SpO2: 92 % O2 Device: None (Room air)    Weight: 137 lbs 2.02 oz    General: Awake and alert. Brighter and more interactive today.  Mouth: edentulous   CV: Normal rate and rhythm, normal S1 and S2. No murmurs, rubs, gallops. Radial pulses 2+ and symmetric.  Respiratory: Lungs clear to auscultation bilaterally. No wheezing, rhonchi, or rales.  Abdomen: Soft, non-distended. Non-tender to palpation. No rebound tenderness or guarding. +BS.   MSK: RLE casted. Joint contractures.   Extremities: Trace edema of the feet bilaterally  Skin: Warm, dry. No rash on visible skin.   Neuro: Alert. Face symmetric, speech slow but intact. Oriented to person and place. Answers questions appropriately.       Medical Decision Making       50 MINUTES SPENT BY ME on the date of service doing chart review, history, exam, documentation & further activities per the note.      Data     I have personally reviewed the following data over the past 24 hrs:    4.8  \   9.6 (L)   / 174     135 104 10.3 /  82   4.2 25 0.72 \     ALT: 15 AST: 22 AP: 52 TBILI: 0.3   ALB: 2.9 (L) TOT PROTEIN: 5.7 (L) LIPASE: N/A       Imaging results reviewed over the past 24 hrs:   No results found for this or any previous visit (from the past 24 hour(s)).

## 2023-11-24 NOTE — PROGRESS NOTES
S: Pt seen at [facility] hosp room 1025 with two family members and nurse available for fitting/delivery and instructions. O: I see the EPIC order to replace the camboot with a PRAFO LT. A: I applied the PRAFO and instructions were given and seemed to be understood by all. P: FU PRN. G: The goal is to reduce pressure on the heel and to dorsiflex the foot to reduce instances of contracture.  Electronically signed Andrés Ball , LPO.

## 2023-11-24 NOTE — PROGRESS NOTES
[facility]  WO Nurse Inpatient Assessment     Consulted for: Left heel pressure injury    Patient History (according to provider note(s):      Per Dr Anirudh Villarreal on 11/20/2023: Marylee Woods is a 68 year old female who sustained a fall from her wheelchair on 11/16 and was found to have a right distal femur fracture and minimally displaced left medial malleolus fracture. Plan to treat both injuries non operatively, right leg in a long leg cast and left ankle in CAM boot. While intubated/sedated no need for CAM boot on left leg due to concern for skin breakdown. Please check for skin breakdown around edges of cast and contact orthopedic surgery for any concerns.      Assessment:      Areas visualized during today's visit: Left foot and ankle    Pressure Injury Location: Left heel    11/20 11/24  Last photo: 11/20/2023  Wound type: Pressure Injury     Pressure Injury Stage: 2, present on admission   Wound history/plan of care:   Present on admission    Wound base: 100 % non-blanchable, erythema, and blister     Palpation of the wound bed: normal      Drainage: none     Description of drainage: none     Measurements (length x width x depth, in cm) 5  x 4  x  0 cm      Tunneling N/A     Undermining N/A  Periwound skin: Intact      Color: pink      Temperature: normal   Odor: none  Pain: no grimacing or signs of discomfort  Pain intervention prior to dressing change: slow and gentle cares   Treatment goal: Heal  and Protection  STATUS: initial assessment  Supplies ordered: supplies stored on unit    Treatment Plan:     Left medial heel pressure injury: Every 3 days: Remove dressing and wash area with saline and gauze. Pat dry. Cover area with Mepilex Flex. Keep heel off-loaded off of mattress with pillows.      Orders: Written    RECOMMEND PRIMARY TEAM ORDER: None, at this time  Education provided: plan of care  Discussed plan of care with: Nurse  WO nurse follow-up plan:  twice weekly  Notify [facility] if wound(s) deteriorate.  Nursing to notify the Provider(s) and re-consult the [facility] Nurse if new skin concern.    DATA:     Current support surface: Standard  Low air loss (MISTY pump, Isolibrium, Pulsate, skin guard, etc)  Containment of urine/stool: Indwelling catheter  BMI: Body mass index is 20.85 kg/m .   Active diet order: Orders Placed This Encounter      Pureed Diet (level 4) Thin Liquids (level 0)     Output: I/O last 3 completed shifts:  In: 320 [I.V.:320]  Out: 1565 [Urine:1565]     Labs:   Recent Labs   Lab 11/24/23  0547 11/19/23  0609 11/19/23  0608   ALBUMIN 2.9*   < >  --    HGB 9.6*   < >  --    INR  --   --  1.17*   WBC 4.8   < >  --     < > = values in this interval not displayed.     Pressure injury risk assessment:   Sensory Perception: 3-->slightly limited  Moisture: 3-->occasionally moist  Activity: 2-->chairfast  Mobility: 2-->very limited  Nutrition: 2-->probably inadequate  Friction and Shear: 2-->potential problem  Broderick Score: 14    Marguerite Reyes RN CWOCN  Pager no longer is use, please contact through [employer] group: [facility]  Dept. Office Number: [phone]

## 2023-11-24 NOTE — PROGRESS NOTES
Brief Hospital Medicine Note:     Paged by RN regarding leg cramps. One time PRN flexeril 5mg ordered.       Jeanine Zacarias PA-C  Hospitalist Service  Contact information available via [facility] Paging/Directory

## 2023-11-24 NOTE — PROGRESS NOTES
Called ortho PA to clarify orders for CAM boot usage as pt is NWB to BLE and her left ankle is at an angle that the boot puts pressure on it.  Armida said she would place order for a different boot.  Also mentioned the stool on the cast and she said that ABD pads can be utilized and slipped in between.    Spoke with care coordinator regarding placement.  Will call her again once  Manuel gets here.  Jacob Block RN

## 2023-11-24 NOTE — PROGRESS NOTES
11/24/23 1100   Appointment Info   Signing Clinician's Name / Credentials (PT) Shaylee Lanza DPT   Rehab Comments (PT) NWB B LEs; R LE cast; L LE CAM boot  (PT obtained subj info from spouse via phone call)   Living Environment   Living Environment Comments From home w/ husb. 2 level home but pt can only access main level; ramp to enter.  primary caregiver.   Self-Care   Equipment Currently Used at Home walker, rolling;wheelchair, manual   Activity/Exercise/Self-Care Comment Pt w/ progressive MS and recent fall out of WC causing B LE fx (R femur and L ankle).  caregiver baseline. He assists w/ lower body dressing in bed (occasionally will stand to pull up pants) and pt does upper body dressing seated. SPT w/ FWW at baseline to . Uses kitchen sink for bathing as shower not accessible. Has been working on short distance amb w/ home PT. They do not have OH lift at home and spouse has never used, would need training.   General Information   Onset of Illness/Injury or Date of Surgery 11/18/23   Referring Physician Ml Thompson MD   Patient/Family Therapy Goals Statement (PT) Pt states she wants to go home multiple times during session;  states he would prefer LTC until pt closer to baseline then return to home; he is open to pt going home lift based if LTC not covered.   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 On 11/17 a rapid response was called as patient was found to be obtunded and transferred to the ICU after intubation for airway protection. Acute mental status change and  respiratory failure thought likely multifactorial including narcotic pain medication, acute infection (UTI), and acute on chronic hypercapneic respiratory failure. Extubated to room air 11/21, and transferred to the general medical floor 11/22.   Existing Precautions/Restrictions fall;brace worn when out of bed;weight bearing   Weight-Bearing Status - LUE full weight-bearing   Weight-Bearing Status - RUE full weight-bearing   Weight-Bearing Status - LLE nonweight-bearing   Weight-Bearing Status - RLE nonweight-bearing   General Observations Pt supine in bed and is screaming and crying in pain on entering; she calms down and converses w/ staff and is then actually very motivated to work w/ PT and get up to a chair.   Cognition   Cognitive Status Comments Pt confused overall and perseverates on wanting to go home and call her son; struggling w/ pain as well.  states via phone call that she has continued to be more confused than baseline   Pain Assessment   Patient Currently in Pain Yes, see Vital Sign flowsheet   Integumentary/Edema   Integumentary/Edema Comments pt has R cast on; L LE no boot on on PT arrival, did don this prior to mobilizing per orders   Posture    Posture Comments pt has maintained flexed posture; unsure if this is baseline or pain response. Note L foot posturing of inverted and PF position, alerted nursing that CAM boot may not be best for this positioning as anticipate skin issues w/ prolonged wearing   Range of Motion (ROM)   Range of Motion ROM deficits secondary to pain  (as well as bracing/casting impacting functional LE ROM; functional UE ROM noted)   Strength (Manual Muscle Testing)   Strength (Manual Muscle Testing) Deficits observed during functional mobility   Bed Mobility   Comment, (Bed Mobility) modAx2 rolling   Transfers   Comment, (Transfers) OH lift for transfers   Gait/Stairs (Locomotion)   Comment, (Gait/Stairs) NT - unable to amb w/ current NWB B LE recs, minimally amb at  baseline; only amb short distances w/ home PT   Balance   Balance Comments needs pillow supports in seated position for balance   Sensory Examination   Sensory Perception Comments does not verbalize   Clinical Impression   Criteria for Skilled Therapeutic Intervention Yes, treatment indicated   PT Diagnosis (PT) impaired functional mobility   Influenced by the following impairments pain, post op precautions, baseline debility 2/2 progressive MS   Functional limitations due to impairments impaired bed mob, transfers, amb   Clinical Presentation (PT Evaluation Complexity) stable   Clinical Presentation Rationale per clinical judgment   Clinical Decision Making (Complexity) low complexity   Planned Therapy Interventions (PT) bed mobility training;home exercise program;patient/family education;strengthening;transfer training  (lift training)   Risk & Benefits of therapy have been explained evaluation/treatment results reviewed;care plan/treatment goals reviewed;risks/benefits reviewed;current/potential barriers reviewed;participants voiced agreement with care plan;participants included;patient;spouse/significant other   Clinical Impression Comments Pt w/ baseline debility and largely using WC w/ SPT only 2/2 progressive MS.  is primary caregiver baseline. W/ current NWB status of B LEs pt will have to be lift based for foreseeable future. At this time plan is for LTC v/ home w/ assist and home PT. Spouse would prefer LTC w/ therapy until pt is closer to baseline. If progress to return home then pt would need to progress to be 1A and would need caregiver training on OH lift.   PT Total Evaluation Time   PT Sheldon Low Complexity Minutes (49063) 5   Physical Therapy Goals   PT Frequency 2x/day   PT Predicted Duration/Target Date for Goal Attainment 12/29/23   PT Goals Bed Mobility;Transfers   PT: Bed Mobility Moderate assist;Supine to/from sit;Rolling;Within precautions  (Ax1 for bed mob to return home w/ caregiver   assist)   PT: Transfers Minimal assist  (Ax1 w/ caregiver for OH lift if return home)   Interventions   Interventions Quick Adds Therapeutic Activity;Therapeutic Procedure   Therapeutic Activity   Therapeutic Activities: dynamic activities to improve functional performance Minutes (44818) 23   Symptoms Noted During/After Treatment Increased pain   Treatment Detail/Skilled Intervention Pt supine in bed, agrees to participate in PT despite appearing to be in notable pain. Did don CAM boot on L LE per orders; R cast in place. Room and lines prepped for mobility. Pt needed modAx2 for rolling in each direction to place sling and support LEs, does have pain in LEs w/ this but overall agrees to continue. Pt dependently lifted from bed to chair, unable to assist w/ ext either LE. Pt positioned in chair and has notable L lean, pillows positioned on each side accordingly for postural support. Pt reclined and made comfortable here. Pt grateful to be up. Discussed recs w/ nursing and care team, discussed w/ nursing updated ortho recs would be appreciated as well.   PT Discharge Planning   PT Plan improve ind w/ bed mob; caregiver training on OH lift if plan for home   PT Discharge Recommendation (DC Rec) Long term care facility;home with assist;home with home care physical therapy   PT Rationale for DC Rec Pt w/ baseline debility and largely using WC w/ SPT only 2/2 progressive MS.  is primary caregiver baseline. W/ current NWB status of B LEs pt will have to be lift based for foreseeable future. At this time plan is for LTC v/ home w/ assist and home PT. Spouse would prefer LTC w/ therapy until pt is closer to baseline. If progress to return home then pt would need to progress to be 1A and would need caregiver training on OH lift.   PT Brief overview of current status Ax2 bed mob; OH lift transfers   Total Session Time   Timed Code Treatment Minutes 23   Total Session Time (sum of timed and untimed services) 28

## 2023-11-24 NOTE — PROGRESS NOTES
Ortho Interval Note:    Recommend patient's left leg goes into a PRAFO while in the hospital, due to concerns for pressure sore from CAM boot.  Pt to remain NWB on bilat LE, they are using a lift for transfers.    Also instructed nurse in clean up of scant dried stool on outside of cast and edge of padding.  Use ABD pad between skin and padding proximally as an extra barrier.  All questions answered.

## 2023-11-25 LAB
ALBUMIN SERPL BCG-MCNC: 2.9 G/DL (ref 3.5–5.2)
ALP SERPL-CCNC: 54 U/L (ref 40–150)
ALT SERPL W P-5'-P-CCNC: 15 U/L (ref 0–50)
ANION GAP SERPL CALCULATED.3IONS-SCNC: 7 MMOL/L (ref 7–15)
AST SERPL W P-5'-P-CCNC: 18 U/L (ref 0–45)
BILIRUB SERPL-MCNC: 0.3 MG/DL
BUN SERPL-MCNC: 13.4 MG/DL (ref 8–23)
CALCIUM SERPL-MCNC: 8.9 MG/DL (ref 8.8–10.2)
CHLORIDE SERPL-SCNC: 104 MMOL/L (ref 98–107)
CREAT SERPL-MCNC: 0.77 MG/DL (ref 0.51–0.95)
DEPRECATED HCO3 PLAS-SCNC: 26 MMOL/L (ref 22–29)
EGFRCR SERPLBLD CKD-EPI 2021: 84 ML/MIN/1.73M2
ERYTHROCYTE [DISTWIDTH] IN BLOOD BY AUTOMATED COUNT: 16.1 % (ref 10–15)
GLUCOSE BLDC GLUCOMTR-MCNC: 111 MG/DL (ref 70–99)
GLUCOSE SERPL-MCNC: 99 MG/DL (ref 70–99)
HCT VFR BLD AUTO: 28.3 % (ref 35–47)
HGB BLD-MCNC: 9.5 G/DL (ref 11.7–15.7)
MAGNESIUM SERPL-MCNC: 1.6 MG/DL (ref 1.7–2.3)
MCH RBC QN AUTO: 30 PG (ref 26.5–33)
MCHC RBC AUTO-ENTMCNC: 33.6 G/DL (ref 31.5–36.5)
MCV RBC AUTO: 89 FL (ref 78–100)
PHOSPHATE SERPL-MCNC: 3.6 MG/DL (ref 2.5–4.5)
PLATELET # BLD AUTO: 210 10E3/UL (ref 150–450)
POTASSIUM SERPL-SCNC: 4.2 MMOL/L (ref 3.4–5.3)
PROT SERPL-MCNC: 5.9 G/DL (ref 6.4–8.3)
RBC # BLD AUTO: 3.17 10E6/UL (ref 3.8–5.2)
SODIUM SERPL-SCNC: 137 MMOL/L (ref 135–145)
WBC # BLD AUTO: 6.3 10E3/UL (ref 4–11)

## 2023-11-25 PROCEDURE — 250N000013 HC RX MED GY IP 250 OP 250 PS 637

## 2023-11-25 PROCEDURE — 83735 ASSAY OF MAGNESIUM: CPT | Performed by: PEDIATRICS

## 2023-11-25 PROCEDURE — 36415 COLL VENOUS BLD VENIPUNCTURE: CPT

## 2023-11-25 PROCEDURE — 250N000013 HC RX MED GY IP 250 OP 250 PS 637: Performed by: PEDIATRICS

## 2023-11-25 PROCEDURE — 80053 COMPREHEN METABOLIC PANEL: CPT

## 2023-11-25 PROCEDURE — 250N000011 HC RX IP 250 OP 636: Mod: JZ | Performed by: PEDIATRICS

## 2023-11-25 PROCEDURE — 250N000013 HC RX MED GY IP 250 OP 250 PS 637: Performed by: INTERNAL MEDICINE

## 2023-11-25 PROCEDURE — 250N000013 HC RX MED GY IP 250 OP 250 PS 637: Performed by: STUDENT IN AN ORGANIZED HEALTH CARE EDUCATION/TRAINING PROGRAM

## 2023-11-25 PROCEDURE — 84100 ASSAY OF PHOSPHORUS: CPT | Performed by: PEDIATRICS

## 2023-11-25 PROCEDURE — 99232 SBSQ HOSP IP/OBS MODERATE 35: CPT | Performed by: STUDENT IN AN ORGANIZED HEALTH CARE EDUCATION/TRAINING PROGRAM

## 2023-11-25 PROCEDURE — 250N000011 HC RX IP 250 OP 636: Performed by: NURSE PRACTITIONER

## 2023-11-25 PROCEDURE — 120N000002 HC R&B MED SURG/OB UMMC

## 2023-11-25 PROCEDURE — 85027 COMPLETE CBC AUTOMATED: CPT

## 2023-11-25 RX ORDER — MAGNESIUM SULFATE HEPTAHYDRATE 40 MG/ML
2 INJECTION, SOLUTION INTRAVENOUS ONCE
Status: COMPLETED | OUTPATIENT
Start: 2023-11-25 | End: 2023-11-25

## 2023-11-25 RX ADMIN — ACETAMINOPHEN 1000 MG: 325 TABLET, FILM COATED ORAL at 06:25

## 2023-11-25 RX ADMIN — HEPARIN SODIUM 5000 UNITS: 5000 INJECTION, SOLUTION INTRAVENOUS; SUBCUTANEOUS at 06:26

## 2023-11-25 RX ADMIN — LEVOTHYROXINE SODIUM 100 MCG: 100 TABLET ORAL at 10:05

## 2023-11-25 RX ADMIN — BACLOFEN 20 MG: 20 TABLET ORAL at 10:00

## 2023-11-25 RX ADMIN — CHOLESTYRAMINE 4 G: 4 POWDER, FOR SUSPENSION ORAL at 21:05

## 2023-11-25 RX ADMIN — BACLOFEN 30 MG: 20 TABLET ORAL at 21:04

## 2023-11-25 RX ADMIN — FAMOTIDINE 40 MG: 20 TABLET ORAL at 21:05

## 2023-11-25 RX ADMIN — Medication 2.5 MG: at 17:37

## 2023-11-25 RX ADMIN — MULTIPLE VITAMINS W/ MINERALS TAB 1 TABLET: TAB at 21:04

## 2023-11-25 RX ADMIN — MAGNESIUM SULFATE HEPTAHYDRATE 2 G: 40 INJECTION, SOLUTION INTRAVENOUS at 06:26

## 2023-11-25 RX ADMIN — LATANOPROST 1 DROP: 50 SOLUTION OPHTHALMIC at 10:06

## 2023-11-25 RX ADMIN — Medication 1 CAPSULE: at 21:04

## 2023-11-25 RX ADMIN — Medication 600 MG: at 09:59

## 2023-11-25 RX ADMIN — OXYBUTYNIN CHLORIDE 10 MG: 10 TABLET, EXTENDED RELEASE ORAL at 10:00

## 2023-11-25 RX ADMIN — CHOLESTYRAMINE 4 G: 4 POWDER, FOR SUSPENSION ORAL at 10:00

## 2023-11-25 RX ADMIN — Medication 1 CAPSULE: at 09:59

## 2023-11-25 RX ADMIN — Medication 2.5 MG: at 06:25

## 2023-11-25 RX ADMIN — HEPARIN SODIUM 5000 UNITS: 5000 INJECTION, SOLUTION INTRAVENOUS; SUBCUTANEOUS at 21:05

## 2023-11-25 RX ADMIN — DULOXETINE 80 MG: 60 CAPSULE, DELAYED RELEASE ORAL at 09:59

## 2023-11-25 RX ADMIN — Medication 1 MG: at 21:04

## 2023-11-25 RX ADMIN — ACYCLOVIR 400 MG: 400 TABLET ORAL at 10:00

## 2023-11-25 RX ADMIN — ACETAMINOPHEN 1000 MG: 325 TABLET, FILM COATED ORAL at 17:36

## 2023-11-25 RX ADMIN — HEPARIN SODIUM 5000 UNITS: 5000 INJECTION, SOLUTION INTRAVENOUS; SUBCUTANEOUS at 14:55

## 2023-11-25 RX ADMIN — HYDROCHLOROTHIAZIDE 12.5 MG: 12.5 TABLET ORAL at 10:05

## 2023-11-25 RX ADMIN — ACYCLOVIR 400 MG: 400 TABLET ORAL at 21:04

## 2023-11-25 ASSESSMENT — ACTIVITIES OF DAILY LIVING (ADL)
ADLS_ACUITY_SCORE: 56
ADLS_ACUITY_SCORE: 56
ADLS_ACUITY_SCORE: 59
ADLS_ACUITY_SCORE: 54
ADLS_ACUITY_SCORE: 59
ADLS_ACUITY_SCORE: 60
ADLS_ACUITY_SCORE: 54
ADLS_ACUITY_SCORE: 59
ADLS_ACUITY_SCORE: 60
ADLS_ACUITY_SCORE: 54
ADLS_ACUITY_SCORE: 56
ADLS_ACUITY_SCORE: 60

## 2023-11-25 NOTE — PLAN OF CARE
Problem: Adult Inpatient Plan of Care  Goal: Plan of Care Review  Description: The Plan of Care Review/Shift note should be completed every shift.  The Outcome Evaluation is a brief statement about your assessment that the patient is improving, declining, or no change.  This information will be displayed automatically on your shift  note.  Outcome: Progressing     Goal Outcome Evaluation:    Pt alert to self and place.  Disoriented to time and situation.  Awake and cooperative for most of the day.  Got into the chair this am via lift with PT.      Spoke with MD about removing CAM boot as pt is non weight bearing.  A PLAFO was ordered instead for neutral alignment and heel offloading.    Spoke with MD about pain regiment.  Baclofen increased back to home dose.  Have not yet restarted the neurontin.  MD did speak with  at bedside.  Verduzco removed at 1430 and purwick in place.      [facility] notified this am as the ext dwell was infiltrated.  They called and said they would come this evening to start new IV.    Speech saw and adv patient to regular diet.     Jacob Block RN

## 2023-11-25 NOTE — PROGRESS NOTES
Dinner tray was ordered for patient by family.  At 1815, RN called the kitchen and asked why the tray hadn't been sent and they said they would put the order through again.    The tray has still not been delivered at 1850.  Jacob Block RN

## 2023-11-25 NOTE — PLAN OF CARE
Shift: 11/25/2023 [id number]  Neuro: Alert to self and place, disoriented to time and situation. PERRL. Afebrile. C/o R leg pain, declined PRN meds, repositioned, rest promoted.    CV: no tele. BP   PU: on RA. LS clear/diminished.  GI: no BM this shift. Regular diet, poor appetite, need encouragement to eat.  : incontinent of urine, purewick in place.  Skin: bruising R groin/upper thigh. Wounds on R thigh/L heel, mepilex in place.    Lines: L PIV.  Other info: family at bedside. discharge pending placement. Transferred to [facility], report given to RN, all belonging sent with patient.         Problem: Adult Inpatient Plan of Care  Goal: Plan of Care Review  Description: The Plan of Care Review/Shift note should be completed every shift.  The Outcome Evaluation is a brief statement about your assessment that the patient is improving, declining, or no change.  This information will be displayed automatically on your shift  note.  11/25/2023 1306 by Archana Choudhury RN  Outcome: Progressing  11/25/2023 1306 by Archana Choudhury RN  Outcome: Progressing  Flowsheets (Taken 11/25/2023 1306)  Plan of Care Reviewed With: patient  Goal: Absence of Hospital-Acquired Illness or Injury  Intervention: Identify and Manage Fall Risk  Recent Flowsheet Documentation  Taken 11/25/2023 1200 by Archana Choudhury RN  Safety Promotion/Fall Prevention:   activity supervised   clutter free environment maintained   increased rounding and observation   increase visualization of patient   room door open   room near nurse's station   room organization consistent   safety round/check completed  Taken 11/25/2023 1000 by Archana Choudhury RN  Safety Promotion/Fall Prevention:   activity supervised   clutter free environment maintained   increased rounding and observation   increase visualization of patient   room door open   room near nurse's station   room organization consistent   safety round/check completed  Intervention: Prevent Skin Injury  Recent Flowsheet  Documentation  Taken 11/25/2023 1200 by Archana Choudhury RN  Body Position:   turned   left   side-lying  Taken 11/25/2023 1000 by Archana Choudhury RN  Body Position:   weight shifting   supine, head elevated  Taken 11/25/2023 0800 by Archana Choudhury RN  Body Position:   turned   right   side-lying  Intervention: Prevent and Manage VTE (Venous Thromboembolism) Risk  Recent Flowsheet Documentation  Taken 11/25/2023 1200 by Archana Choudhury RN  VTE Prevention/Management: SCDs (sequential compression devices) off  Taken 11/25/2023 1000 by Archana Choudhury RN  VTE Prevention/Management: SCDs (sequential compression devices) off  Goal: Optimal Comfort and Wellbeing  Intervention: Provide Person-Centered Care  Recent Flowsheet Documentation  Taken 11/25/2023 1200 by Archana Choudhury RN  Trust Relationship/Rapport:   care explained   questions answered   questions encouraged   reassurance provided   choices provided  Taken 11/25/2023 1000 by Archana Choudhury RN  Trust Relationship/Rapport:   care explained   questions answered   questions encouraged   reassurance provided   choices provided     Problem: Restraint, Nonviolent  Goal: Absence of Harm or Injury  Intervention: Protect Dignity, Rights and Personal Wellbeing  Recent Flowsheet Documentation  Taken 11/25/2023 1200 by Archana Choudhury RN  Trust Relationship/Rapport:   care explained   questions answered   questions encouraged   reassurance provided   choices provided  Taken 11/25/2023 1000 by Archana Choudhury RN  Trust Relationship/Rapport:   care explained   questions answered   questions encouraged   reassurance provided   choices provided  Intervention: Protect Skin and Joint Integrity  Recent Flowsheet Documentation  Taken 11/25/2023 1200 by Archana Choudhury RN  Body Position:   turned   left   side-lying  Taken 11/25/2023 1000 by Archana Choudhury RN  Body Position:   weight shifting   supine, head elevated  Taken 11/25/2023 0800 by Archana Choudhury RN  Body Position:   turned   right   side-lying     Problem: Fall  Injury Risk  Goal: Absence of Fall and Fall-Related Injury  Intervention: Identify and Manage Contributors  Recent Flowsheet Documentation  Taken 11/25/2023 1200 by Archana Choudhury, RN  Medication Review/Management: medications reviewed  Taken 11/25/2023 1000 by Archana Choudhury RN  Medication Review/Management: medications reviewed  Intervention: Promote Injury-Free Environment  Recent Flowsheet Documentation  Taken 11/25/2023 1200 by Archana Choudhury, RN  Safety Promotion/Fall Prevention:   activity supervised   clutter free environment maintained   increased rounding and observation   increase visualization of patient   room door open   room near nurse's station   room organization consistent   safety round/check completed  Taken 11/25/2023 1000 by Archana Choudhury, RN  Safety Promotion/Fall Prevention:   activity supervised   clutter free environment maintained   increased rounding and observation   increase visualization of patient   room door open   room near nurse's station   room organization consistent   safety round/check completed   Goal Outcome Evaluation:      Plan of Care Reviewed With: patient

## 2023-11-25 NOTE — PROGRESS NOTES
10A ICU End of Shift Summary.      Neuro: A&O x1-2, oriented to self and occasionally time or situation. Confused.   Cardiac: VSS, NSR, normotensive  Respiratory: O2 sats stable on room air. Clear diminished lung sounds throughout.  GI/: NO BM this shift. Verduzco removed yesterday. Purewick in place, output adequate  Diet/appetite: Regular diet with thins, tray set up.   Pain: Pt complaining of pain to left leg and right hip, flexeril, Oxycodone and tylenol given with good relief.  Skin: Cast to right leg. Mepilex to pressure injury to L heel. Orthotic brace/boot to left foot. Scattered bruising.  LDA's: Extended dwell to LFA.  Activities: W/C bound at baseline, up to chair with ceiling lift. Q2h turns in bed.     See flowsheets for vital signs and detailed assessment.

## 2023-11-25 NOTE — PROGRESS NOTES
Pt admitted from ICU to room 546. Pt has all belongings. Pt transferred from w/c to bed via lift. Pt placed on continuous pulse monitoring. Family at bedside.     Ana Moody RN on 11/25/2023 at 5:06 PM

## 2023-11-25 NOTE — PROGRESS NOTES
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 On 11/17 a rapid response was called as patient was found to be obtunded and transferred to the ICU after intubation for airway protection. Acute mental status change and respiratory failure thought likely multifactorial including narcotic pain medication, acute infection (UTI), and acute on chronic hypercapneic respiratory failure. Extubated to room air 11/21, and transferred to the general medical floor 11/22.     PLAN:     # Toxic metabolic encephalopathy, multifactorial including hypercapnea, infection, and medication toxicity, improving   # C/f non-convulsive seizures (ruled out)  Rapid response was called 11/17 as patient was found to be obtunded and non-arousable. Attempts to arousing patient including Narcan were unsuccessful and was later transferred to the ICU. With concerns for protecting her airway, patient was intubated. Likely a combination of acute on chronic hypercapnea, opioid toxicity, and infection from UTI. Head CT and Brain MRI on 11/19 without acute pathology. Mental status improving but not yet back to baseline.   - Delirium precautions   - Hold pta Nuvigil for sleep, to reduce AMS for now  - Slow re-introduction of home medications   - Neurology consulted, now signed off. They did not appreciate any seizures on EEG. If mental status does not improve they can be reconsulted, and recommend repeat MRI of the head in one week to evaluate for anoxic brain injury (though unlikely)   - EEG  11/20: When sedation was in use, moderate to severe encephalopathy noted. When sedation was not in use, moderate diffuse encephalopathy was noted.  - Careful use of opiate and other sedating medications     # Progressive Multiple Sclerosis (wheelchair bound)   Patient has longstanding diagnosis of progressive MS now wheelchair bound. Follows with Immunology and Neurology outpatient. Last brain MRI in 2017 showing suspicion of demyelinating disease. Repeat brain MRI on 11/19/23 showed scattered T2 hyperintense foci throughout the subcorical and periventricular white matter grossly unchanged when compared to MRI on 11/1/2017. Unsure of exact biologics patient has was on, appears she has received Lemtrada (2017, 2019), and other past DMTs included Betaseron, Aubagio.   - Resume PTA dose of Baclofen 20 mg QAM and 30 mg at bedtime  - Will consider restarting PTA gabapentin   - Hold pta oxybutynin for neurogenic bladder 2/2 MS    # Two outpatient mechanical falls   # Right distal femur fracture and left malleolus fracture  # Osteoporosis   - Orthopedics following  - NWB on bilateral lower extremities  - R leg long cast, L leg PRAFO   - Monitor pulses  - Oxycodone 2.5 mg Q6 hours, monitor for over sedation  - PT consulted     # Major Depressive Disorder   - Continue Cymbalta     # Sleep apnea 2/2 MS   Appears patient follows with sleep clinic/doctor and found to have sleep apnea 2/2 her MS   - Wears a CPAP at home   - Wear CPAP at HS now that she is extubated     # Tobacco use; current smoker      # Hypercapnia - Resolved  # History of pulmonary nodules   Recent CT Chest from 6/16/23 shows right lower lobe nodule has resolved with a stable left lower lobe nodule.   - Now on yearly chest CT follow-ups      # Hx of hypertension  Patient has been on/off levophed. Has received a total of 1.5L of crystalloid.  - 11/22 restarted PTA hydrochlorothiazide     # GERD  # risk for malnutrition  - pantoprazole 40 mg suspension daily  -  RD consulted, following   - Speech consulted, following  - Cleared for pureed diet with thin liquids      # DENNISE (resolved)  # CKD   # Recurrent UTI   # Hypophosphatemia  Patients baseline creatinine around 0.96-1.08. DENNISE with CKD concerns for any sedating related medications could have caused patient to become obtunded. Confirmed UTI as below; previous recurrent UTI with klebsiella pneuomiae as well.   - Reduce nephrotoxic agents as able   - Monitor I/Os   - UC growing Klebsiella pneumoniae 11/19  - Continue ceftriaxone 1g q24h, treat for 7 days total  - Daily BMP  - Mg, K, Phos protocols     # Hx Graves s/p thyroid ablation   - TSH 0.64   - Continue pta synthroid 100 mcg daily   - Hypoglycemia protocol     # Herpes simplex virus; prophylaxis on Acyclovir   - Continue pta Acyclovir 400 mg (Changed to IV for now and can change back to tablet once swallow is evaluated and passed)     # Recurrent UTIs  Suspect AMS related to recurrent UTI  - Stopped PTA macrobid  - Continue ceftriaxone 1g q24h X 7 days  - UC with Klebsiella pneumoniae 10/7 and 11/19  - NGTD 11/19 on blood cx      # Anemia of chronic disease 2/2 CKD   # Thrombocytopenia   Hgb 6.2 11/19. Was given 1 unit PRBCs.   - hemoglobin stable  - Hold pta ferrous sulfate  - Daily CBC      # Ductal carcinoma in situ of left breast   - Follows with outpatient radiation/oncology and medical oncology annually            Diet: Snacks/Supplements Adult: Ensure Enlive; With Meals  Room Service  Combination Diet Regular Diet; Thin Liquids (level 0)    DVT Prophylaxis: Heparin SQ  Verduzco Catheter: Not present  Lines: None     Cardiac Monitoring: None  Code Status: Full Code      Clinically Significant Risk Factors            # Hypomagnesemia: Lowest Mg = 1.6 mg/dL in last 2 days, will replace as needed   # Hypoalbuminemia: Lowest albumin = 2.7 g/dL at 11/21/2023  4:59 AM, will monitor as appropriate                # Financial/Environmental Concerns: none         Disposition  Plan             Domonique Lao MD  Hospitalist Service, GOLD TEAM 21  [facility]  Securely message with [employer] (more info)  Text page via [employer] Paging/Directory   See signed in provider for up to date coverage information  ______________________________________________________________________    Interval History   Mental status continues to improve. Diet was advanced to regular which she was pleased about. Pain is controlled with oral medications.     Physical Exam   Vital Signs: Temp: 98.7  F (37.1  C) Temp src: Oral BP: (!) 146/65 Pulse: 88   Resp: 18 SpO2: 100 % O2 Device: None (Room air)    Weight: 138 lbs .13 oz    General: Sleeping, wakes to voice. Pleasant and interactive.  Mouth: edentulous   CV: Normal rate and rhythm, normal S1 and S2. No murmurs, rubs, gallops. Radial pulses 2+ and symmetric.  Respiratory: Lungs clear to auscultation bilaterally. No wheezing, rhonchi, or rales.  Abdomen: Soft, non-distended. Non-tender to palpation. No rebound tenderness or guarding. +BS.   MSK: RLE casted. Joint contractures.   Extremities: Trace edema of the feet bilaterally  Skin: Warm, dry. No rash on visible skin.   Neuro: Alert. Face symmetric, speech slow but intact. Oriented to person and place. Answers questions appropriately.       Medical Decision Making       45 MINUTES SPENT BY ME on the date of service doing chart review, history, exam, documentation & further activities per the note.      Data     I have personally reviewed the following data over the past 24 hrs:    6.3  \   9.5 (L)   / 210     137 104 13.4 /  99   4.2 26 0.77 \     ALT: 15 AST: 18 AP: 54 TBILI: 0.3   ALB: 2.9 (L) TOT PROTEIN: 5.9 (L) LIPASE: N/A       Imaging results reviewed over the past 24 hrs:   No results found for this or any previous visit (from the past 24 hour(s)).

## 2023-11-25 NOTE — PROGRESS NOTES
RNCC contacted the pt's  to discuss LTC vs home with home care. The pt's  is unsure if LTC is needed and is wondering why PT informed him that TCU would be inappropriate. I informed the pt's  that I will have the weekday SW contact him and coordinate a meeting with the SW, him, therapy, and maybe the hospitalist, to solidify a plan moving forward. The pt's  is agreeable to this. SW please follow up Monday.  The pt's  has questions about what long term care looks like, if insurance would cver this, if TCU is an option, etc.  Elizabeth Ríos, RN, BSN, PHN  Weekend/Holiday RNCC Pager [phone]

## 2023-11-25 NOTE — PLAN OF CARE
Patient has been educated on potential risks of choosing to leave the unit and that the responsibility for patient well-being will belong to the patient. Pt has been informed that admission to hospital is due to need for medical treatment. Education given to the patient on some of the potential risks included but are not limited to:      - lack of access to nursing intervention      - possible missed appointments with MD, therapies, tests      - possible missed medications, antibiotics, management of IV's    Patient Response: patient understand the risk    Patient notified staff prior to leaving unit: yes  Coban wrap placed over IV prior to pt leaving unit yes    Opioid Pregnancy And Lactation Text: These medications can lead to premature delivery and should be avoided during pregnancy. These medications are also present in breast milk in small amounts.

## 2023-11-26 ENCOUNTER — APPOINTMENT (OUTPATIENT)
Dept: SPEECH THERAPY | Facility: CLINIC | Age: 68
DRG: 533 | End: 2023-11-26
Attending: PEDIATRICS
Payer: MEDICARE

## 2023-11-26 LAB
ALBUMIN SERPL BCG-MCNC: 3.2 G/DL (ref 3.5–5.2)
ALP SERPL-CCNC: 67 U/L (ref 40–150)
ALT SERPL W P-5'-P-CCNC: 14 U/L (ref 0–50)
ANION GAP SERPL CALCULATED.3IONS-SCNC: 9 MMOL/L (ref 7–15)
AST SERPL W P-5'-P-CCNC: 23 U/L (ref 0–45)
BILIRUB SERPL-MCNC: 0.4 MG/DL
BUN SERPL-MCNC: 13 MG/DL (ref 8–23)
CALCIUM SERPL-MCNC: 9.6 MG/DL (ref 8.8–10.2)
CHLORIDE SERPL-SCNC: 100 MMOL/L (ref 98–107)
CREAT SERPL-MCNC: 0.72 MG/DL (ref 0.51–0.95)
DEPRECATED HCO3 PLAS-SCNC: 28 MMOL/L (ref 22–29)
EGFRCR SERPLBLD CKD-EPI 2021: >90 ML/MIN/1.73M2
ERYTHROCYTE [DISTWIDTH] IN BLOOD BY AUTOMATED COUNT: 16.6 % (ref 10–15)
GLUCOSE SERPL-MCNC: 92 MG/DL (ref 70–99)
HCT VFR BLD AUTO: 32.7 % (ref 35–47)
HGB BLD-MCNC: 10.8 G/DL (ref 11.7–15.7)
MAGNESIUM SERPL-MCNC: 1.8 MG/DL (ref 1.7–2.3)
MCH RBC QN AUTO: 29.7 PG (ref 26.5–33)
MCHC RBC AUTO-ENTMCNC: 33 G/DL (ref 31.5–36.5)
MCV RBC AUTO: 90 FL (ref 78–100)
PHOSPHATE SERPL-MCNC: 3.7 MG/DL (ref 2.5–4.5)
PLATELET # BLD AUTO: 239 10E3/UL (ref 150–450)
POTASSIUM SERPL-SCNC: 4.1 MMOL/L (ref 3.4–5.3)
PROT SERPL-MCNC: 6.3 G/DL (ref 6.4–8.3)
RBC # BLD AUTO: 3.64 10E6/UL (ref 3.8–5.2)
SODIUM SERPL-SCNC: 137 MMOL/L (ref 135–145)
WBC # BLD AUTO: 6.6 10E3/UL (ref 4–11)

## 2023-11-26 PROCEDURE — 92526 ORAL FUNCTION THERAPY: CPT | Mod: GN

## 2023-11-26 PROCEDURE — 250N000013 HC RX MED GY IP 250 OP 250 PS 637: Performed by: PEDIATRICS

## 2023-11-26 PROCEDURE — 250N000011 HC RX IP 250 OP 636: Performed by: NURSE PRACTITIONER

## 2023-11-26 PROCEDURE — 99232 SBSQ HOSP IP/OBS MODERATE 35: CPT | Performed by: STUDENT IN AN ORGANIZED HEALTH CARE EDUCATION/TRAINING PROGRAM

## 2023-11-26 PROCEDURE — 36415 COLL VENOUS BLD VENIPUNCTURE: CPT

## 2023-11-26 PROCEDURE — 250N000013 HC RX MED GY IP 250 OP 250 PS 637: Performed by: INTERNAL MEDICINE

## 2023-11-26 PROCEDURE — 120N000002 HC R&B MED SURG/OB UMMC

## 2023-11-26 PROCEDURE — 250N000013 HC RX MED GY IP 250 OP 250 PS 637: Performed by: STUDENT IN AN ORGANIZED HEALTH CARE EDUCATION/TRAINING PROGRAM

## 2023-11-26 PROCEDURE — 250N000013 HC RX MED GY IP 250 OP 250 PS 637

## 2023-11-26 PROCEDURE — 84100 ASSAY OF PHOSPHORUS: CPT | Performed by: PEDIATRICS

## 2023-11-26 PROCEDURE — 85014 HEMATOCRIT: CPT

## 2023-11-26 PROCEDURE — 83735 ASSAY OF MAGNESIUM: CPT | Performed by: PEDIATRICS

## 2023-11-26 PROCEDURE — 80053 COMPREHEN METABOLIC PANEL: CPT

## 2023-11-26 RX ADMIN — Medication 2.5 MG: at 17:59

## 2023-11-26 RX ADMIN — OXYBUTYNIN CHLORIDE 10 MG: 10 TABLET, EXTENDED RELEASE ORAL at 07:52

## 2023-11-26 RX ADMIN — HYDROCHLOROTHIAZIDE 12.5 MG: 12.5 TABLET ORAL at 07:51

## 2023-11-26 RX ADMIN — Medication 1 CAPSULE: at 19:42

## 2023-11-26 RX ADMIN — HEPARIN SODIUM 5000 UNITS: 5000 INJECTION, SOLUTION INTRAVENOUS; SUBCUTANEOUS at 21:37

## 2023-11-26 RX ADMIN — BACLOFEN 20 MG: 20 TABLET ORAL at 07:52

## 2023-11-26 RX ADMIN — ACYCLOVIR 400 MG: 400 TABLET ORAL at 08:38

## 2023-11-26 RX ADMIN — HEPARIN SODIUM 5000 UNITS: 5000 INJECTION, SOLUTION INTRAVENOUS; SUBCUTANEOUS at 05:43

## 2023-11-26 RX ADMIN — FAMOTIDINE 40 MG: 20 TABLET ORAL at 21:37

## 2023-11-26 RX ADMIN — HEPARIN SODIUM 5000 UNITS: 5000 INJECTION, SOLUTION INTRAVENOUS; SUBCUTANEOUS at 14:11

## 2023-11-26 RX ADMIN — MULTIPLE VITAMINS W/ MINERALS TAB 1 TABLET: TAB at 19:42

## 2023-11-26 RX ADMIN — DULOXETINE 80 MG: 60 CAPSULE, DELAYED RELEASE ORAL at 07:51

## 2023-11-26 RX ADMIN — Medication 1 CAPSULE: at 07:51

## 2023-11-26 RX ADMIN — ACYCLOVIR 400 MG: 400 TABLET ORAL at 19:42

## 2023-11-26 RX ADMIN — BACLOFEN 30 MG: 20 TABLET ORAL at 21:37

## 2023-11-26 RX ADMIN — Medication 600 MG: at 07:51

## 2023-11-26 RX ADMIN — GABAPENTIN 300 MG: 300 CAPSULE ORAL at 19:42

## 2023-11-26 ASSESSMENT — ACTIVITIES OF DAILY LIVING (ADL)
ADLS_ACUITY_SCORE: 47
ADLS_ACUITY_SCORE: 54
ADLS_ACUITY_SCORE: 54
ADLS_ACUITY_SCORE: 47
ADLS_ACUITY_SCORE: 54
ADLS_ACUITY_SCORE: 47
ADLS_ACUITY_SCORE: 54

## 2023-11-26 NOTE — PLAN OF CARE
Pt is A&Ox4. VSS. LS clear, on RA. BS active, LBM on 11/25/2023. Pt is incontinent of bowel and bladder. Purewick in place. Pain managed with 2.5mg oxycodone. Pt up with 2 assist with lift. RLE has cast. Continue to monitor.

## 2023-11-26 NOTE — PROGRESS NOTES
Fall, left femur fx, right ankle fx, ICU stay  HX: graves disease, MS, MARYLU,     Orientation: A&o x2-3, can get confused easily and forgetful   Bowel: incont LBM 11/24  Bladder: incont purewick   Pain:10/10 pain, Pt has tylenol, oxy 2.5 Q6, and scheduled baclofen   Ambulation/Transfers: A2-3 to repo and lift to w/c   Diet/ Liquids: regular, thin, whole   Tubes/ Lines/ Drains: PIV left forearm   Oxygen: RA. On continuous pulse monitoring   Skin: bruise right forearm, bruising around thigh by cast R, mepilex on bilateral groin/upper thighs, coccyx protective mepilex applied. Left heal pressure injury under boot.     Pt extubated 11/21.     Last turned at 1825. Purewick in place. Pt declined dinner. Pt very uncomfortable multiple position changes, ice packs applied. Has BiPap for nights.     Ana Moody RN on 11/25/2023 at 6:33 PM

## 2023-11-26 NOTE — PLAN OF CARE
[id number]:    Pt is alert, intermittent confusion, disoriented to time and placement.     Denied chest pain, SOB, nausea, or N/T.     C/o left hip pain when repositioning, managed with PRN oxy.     Bedrest.     Incontinent of B/B, purewick in place, LBM 11/26.     RLE on cast, LLE on brace, BLE elevated on pillows.       Regular diet, takes pills as whole with water.     BRAVO LICEA.     Plan to discharge LTC vs Homecare.

## 2023-11-26 NOTE — PLAN OF CARE
Speech Language Therapy Discharge Summary    Reason for therapy discharge:    All goals and outcomes met, no further needs identified.    Progress towards therapy goal(s). See goals on Care Plan in [facility] electronic health record for goal details.  Goals met    Therapy recommendation(s):    No further therapy is recommended. Recommend continue regular diet and thin liquids. Patient must be alert and sitting upright for all intake. Distant supervision for safe PO intake. Recommend slow rate and alternate bites/sips. Pt is following diet and strategies with no concerns. Speech Therapy will sign off. Please re-consult if change in status.

## 2023-11-26 NOTE — PROGRESS NOTES
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 On 11/17 a rapid response was called as patient was found to be obtunded and transferred to the ICU after intubation for airway protection. Acute mental status change and respiratory failure thought likely multifactorial including narcotic pain medication, acute infection (UTI), and acute on chronic hypercapneic respiratory failure. Extubated to room air 11/21, and transferred to the general medical floor 11/22.     Today:  - Restart PTA gabapentin for better pain control, monitor mental status closely. Start at lower dose and work up as tolerated.  - Care team meeting with family tomorrow to discuss disposition to LTC versus home with home care/equipment     PLAN:     # Toxic metabolic encephalopathy, multifactorial including hypercapnea, infection, and medication toxicity, improving   # C/f non-convulsive seizures (ruled out)  Rapid response was called 11/17 as patient was found to be obtunded and non-arousable. Attempts to arousing patient including Narcan were unsuccessful and was later transferred to the ICU. With concerns for protecting her airway, patient was intubated. Likely a combination of acute on chronic hypercapnea, opioid toxicity, and infection from UTI. Head CT and Brain MRI on 11/19 without acute pathology. Mental status improving but not yet back to baseline.   - Delirium precautions   - Hold pta Nuvigil for sleep, to reduce AMS for now  - Slow re-introduction of home medications   -  Neurology consulted, now signed off. They did not appreciate any seizures on EEG. If mental status does not improve they can be reconsulted, and recommend repeat MRI of the head in one week to evaluate for anoxic brain injury (though unlikely)   - EEG 11/20: When sedation was in use, moderate to severe encephalopathy noted. When sedation was not in use, moderate diffuse encephalopathy was noted.  - Careful use of opiate and other sedating medications     # Progressive Multiple Sclerosis (wheelchair bound)   Patient has longstanding diagnosis of progressive MS now wheelchair bound. Follows with Immunology and Neurology outpatient. Last brain MRI in 2017 showing suspicion of demyelinating disease. Repeat brain MRI on 11/19/23 showed scattered T2 hyperintense foci throughout the subcorical and periventricular white matter grossly unchanged when compared to MRI on 11/1/2017. Unsure of exact biologics patient has was on, appears she has received Lemtrada (2017, 2019), and other past DMTs included Betaseron, Aubagio.   - Resume PTA dose of Baclofen 20 mg QAM and 30 mg at bedtime  - Restart PTA gabapentin at lower dose, 300 mg BID. At home takes 600 mg QID.   - Hold pta oxybutynin for neurogenic bladder 2/2 MS    # Two outpatient mechanical falls   # Right distal femur fracture and left malleolus fracture  # Osteoporosis   - Orthopedics following  - NWB on bilateral lower extremities  - R leg long cast, L leg PRAFO   - Monitor pulses  - Oxycodone 2.5 mg Q6 hours, monitor for over sedation  - PT consulted     # Major Depressive Disorder   - Continue Cymbalta     # Sleep apnea 2/2 MS   Appears patient follows with sleep clinic/doctor and found to have sleep apnea 2/2 her MS   - Wears a CPAP at home   - Wear CPAP at  now that she is extubated     # Tobacco use; current smoker      # Hypercapnia - Resolved  # History of pulmonary nodules   Recent CT Chest from 6/16/23 shows right lower lobe nodule has resolved with a stable  left lower lobe nodule.   - Now on yearly chest CT follow-ups      # Hx of hypertension  Patient has been on/off levophed. Has received a total of 1.5L of crystalloid.  - 11/22 restarted PTA hydrochlorothiazide     # GERD  # risk for malnutrition  - pantoprazole 40 mg suspension daily  - RD consulted, following   - Speech consulted, following  - Cleared for pureed diet with thin liquids      # DENNISE (resolved)  # CKD   # Recurrent UTI   # Hypophosphatemia  Patients baseline creatinine around 0.96-1.08. DENNISE with CKD concerns for any sedating related medications could have caused patient to become obtunded. Confirmed UTI as below; previous recurrent UTI with klebsiella pneuomiae as well.   - Reduce nephrotoxic agents as able   - Monitor I/Os   - UC growing Klebsiella pneumoniae 11/19  - s/p ceftriaxone 1g q24h x 7 days total  - Daily BMP  - Mg, K, Phos protocols     # Hx Graves s/p thyroid ablation   - TSH 0.64   - Continue pta synthroid 100 mcg daily   - Hypoglycemia protocol     # Herpes simplex virus; prophylaxis on Acyclovir   - Continue pta Acyclovir 400 mg (Changed to IV for now and can change back to tablet once swallow is evaluated and passed)     # Recurrent UTIs  Suspect AMS related to recurrent UTI  - Stopped PTA macrobid  - S/p ceftriaxone 1g q24h X 7 days  - UC with Klebsiella pneumoniae 10/7 and 11/19  - NGTD 11/19 on blood cx      # Anemia of chronic disease 2/2 CKD   # Thrombocytopenia   Hgb 6.2 11/19. Was given 1 unit PRBCs.   - hemoglobin stable  - Hold pta ferrous sulfate  - Daily CBC      # Ductal carcinoma in situ of left breast   - Follows with outpatient radiation/oncology and medical oncology annually            Diet: Snacks/Supplements Adult: Ensure Enlive; With Meals  Room Service  Combination Diet Regular Diet; Thin Liquids (level 0)    DVT Prophylaxis: Heparin SQ  Verduzco Catheter: Not present  Lines: None     Cardiac Monitoring: None  Code Status: Full Code      Clinically Significant Risk  Factors           # Hypercalcemia: corrected calcium is >10.1, will monitor as appropriate  # Hypomagnesemia: Lowest Mg = 1.6 mg/dL in last 2 days, will replace as needed   # Hypoalbuminemia: Lowest albumin = 2.7 g/dL at 11/21/2023  4:59 AM, will monitor as appropriate                # Financial/Environmental Concerns: none         Disposition Plan             Domonique Lao MD  Hospitalist Service, GOLD TEAM 21  [facility]  Securely message with [employer] (more info)  Text page via [facility] Paging/Directory   See signed in provider for up to date coverage information  ______________________________________________________________________    Interval History   Doing well this morning. Denied any pain currently. Asking about when she can go home. Discussed we are going to have a meeting tomorrow with Manuel and the care team to talk about discharge. No other concerns.     Physical Exam   Vital Signs: Temp: 99.1  F (37.3  C) Temp src: Oral BP: (!) 153/62 Pulse: 90   Resp: 16 SpO2: 99 % O2 Device: None (Room air)    Weight: 138 lbs .13 oz    General: Sleeping, wakes to voice. Pleasant and interactive.  Mouth: edentulous   CV: Normal rate and rhythm, normal S1 and S2. No murmurs, rubs, gallops. Radial pulses 2+ and symmetric.  Respiratory: Lungs clear to auscultation bilaterally. No wheezing, rhonchi, or rales.  Abdomen: Soft, non-distended. Non-tender to palpation. No rebound tenderness or guarding. +BS.   MSK: RLE casted. Left lower extremity in boot. Joint contractures.   Extremities: Trace edema of the feet bilaterally  Skin: Warm, dry. No rash on visible skin.   Neuro: Alert. Face symmetric, speech slow but intact. Oriented to person and place. Answers questions appropriately.       Medical Decision Making       45 MINUTES SPENT BY ME on the date of service doing chart review, history, exam, documentation & further activities per the note.      Data     I have personally  reviewed the following data over the past 24 hrs:    6.6  \   10.8 (L)   / 239     137 100 13.0 /  92   4.1 28 0.72 \     ALT: 14 AST: 23 AP: 67 TBILI: 0.4   ALB: 3.2 (L) TOT PROTEIN: 6.3 (L) LIPASE: N/A       Imaging results reviewed over the past 24 hrs:   No results found for this or any previous visit (from the past 24 hour(s)).

## 2023-11-26 NOTE — PLAN OF CARE
"Goal Outcome Evaluation:      VS: BP (!) 157/72 (BP Location: Right arm, Patient Position: Semi-Liriano's, Cuff Size: Adult Regular)   Pulse 82   Temp 98.6  F (37  C) (Oral)   Resp 18   Ht 1.727 m (5' 8\")   Wt 62.6 kg (138 lb 0.1 oz)   LMP  (LMP Unknown)   SpO2 99%   BMI 20.98 kg/m       O2: RA   Output: Incontinent of bowel and bladder   Last BM: Before admission   Activity: A2 Lift   Skin: Wound: R heel   Pain: Denied during this shift   CMS: Alert and orientedx3, intermittently confused/forgetful   Dressing: CDI   Diet: Reg   LDA: L PIV   Equipment: LAURI Drew cast   Plan: TBD, continue to monitor   Additional Info: BIpap at night       "

## 2023-11-27 ENCOUNTER — APPOINTMENT (OUTPATIENT)
Dept: GENERAL RADIOLOGY | Facility: CLINIC | Age: 68
DRG: 533 | End: 2023-11-27
Attending: PEDIATRICS
Payer: MEDICARE

## 2023-11-27 ENCOUNTER — APPOINTMENT (OUTPATIENT)
Dept: PHYSICAL THERAPY | Facility: CLINIC | Age: 68
DRG: 533 | End: 2023-11-27
Attending: PEDIATRICS
Payer: MEDICARE

## 2023-11-27 LAB
ALBUMIN SERPL BCG-MCNC: 3.1 G/DL (ref 3.5–5.2)
ALP SERPL-CCNC: 72 U/L (ref 40–150)
ALT SERPL W P-5'-P-CCNC: 15 U/L (ref 0–50)
ANION GAP SERPL CALCULATED.3IONS-SCNC: 6 MMOL/L (ref 7–15)
AST SERPL W P-5'-P-CCNC: 21 U/L (ref 0–45)
BILIRUB SERPL-MCNC: 0.4 MG/DL
BUN SERPL-MCNC: 12.3 MG/DL (ref 8–23)
CALCIUM SERPL-MCNC: 9.1 MG/DL (ref 8.8–10.2)
CHLORIDE SERPL-SCNC: 97 MMOL/L (ref 98–107)
CREAT SERPL-MCNC: 0.75 MG/DL (ref 0.51–0.95)
DEPRECATED HCO3 PLAS-SCNC: 29 MMOL/L (ref 22–29)
EGFRCR SERPLBLD CKD-EPI 2021: 86 ML/MIN/1.73M2
ERYTHROCYTE [DISTWIDTH] IN BLOOD BY AUTOMATED COUNT: 16.6 % (ref 10–15)
GLUCOSE SERPL-MCNC: 93 MG/DL (ref 70–99)
HCT VFR BLD AUTO: 28.9 % (ref 35–47)
HGB BLD-MCNC: 9.7 G/DL (ref 11.7–15.7)
MAGNESIUM SERPL-MCNC: 1.3 MG/DL (ref 1.7–2.3)
MCH RBC QN AUTO: 30.2 PG (ref 26.5–33)
MCHC RBC AUTO-ENTMCNC: 33.6 G/DL (ref 31.5–36.5)
MCV RBC AUTO: 90 FL (ref 78–100)
PHOSPHATE SERPL-MCNC: 3.2 MG/DL (ref 2.5–4.5)
PLATELET # BLD AUTO: 251 10E3/UL (ref 150–450)
POTASSIUM SERPL-SCNC: 4.1 MMOL/L (ref 3.4–5.3)
PROT SERPL-MCNC: 6 G/DL (ref 6.4–8.3)
RBC # BLD AUTO: 3.21 10E6/UL (ref 3.8–5.2)
SODIUM SERPL-SCNC: 132 MMOL/L (ref 135–145)
WBC # BLD AUTO: 5.2 10E3/UL (ref 4–11)

## 2023-11-27 PROCEDURE — 83735 ASSAY OF MAGNESIUM: CPT | Performed by: STUDENT IN AN ORGANIZED HEALTH CARE EDUCATION/TRAINING PROGRAM

## 2023-11-27 PROCEDURE — 36415 COLL VENOUS BLD VENIPUNCTURE: CPT

## 2023-11-27 PROCEDURE — 36415 COLL VENOUS BLD VENIPUNCTURE: CPT | Performed by: INTERNAL MEDICINE

## 2023-11-27 PROCEDURE — 250N000013 HC RX MED GY IP 250 OP 250 PS 637: Performed by: STUDENT IN AN ORGANIZED HEALTH CARE EDUCATION/TRAINING PROGRAM

## 2023-11-27 PROCEDURE — 99232 SBSQ HOSP IP/OBS MODERATE 35: CPT | Performed by: STUDENT IN AN ORGANIZED HEALTH CARE EDUCATION/TRAINING PROGRAM

## 2023-11-27 PROCEDURE — 250N000013 HC RX MED GY IP 250 OP 250 PS 637: Performed by: INTERNAL MEDICINE

## 2023-11-27 PROCEDURE — 85027 COMPLETE CBC AUTOMATED: CPT

## 2023-11-27 PROCEDURE — 84100 ASSAY OF PHOSPHORUS: CPT | Performed by: STUDENT IN AN ORGANIZED HEALTH CARE EDUCATION/TRAINING PROGRAM

## 2023-11-27 PROCEDURE — 250N000013 HC RX MED GY IP 250 OP 250 PS 637: Performed by: PEDIATRICS

## 2023-11-27 PROCEDURE — 250N000013 HC RX MED GY IP 250 OP 250 PS 637

## 2023-11-27 PROCEDURE — 97530 THERAPEUTIC ACTIVITIES: CPT | Mod: GP

## 2023-11-27 PROCEDURE — 73560 X-RAY EXAM OF KNEE 1 OR 2: CPT | Mod: RT

## 2023-11-27 PROCEDURE — 250N000011 HC RX IP 250 OP 636: Mod: JZ | Performed by: STUDENT IN AN ORGANIZED HEALTH CARE EDUCATION/TRAINING PROGRAM

## 2023-11-27 PROCEDURE — 250N000011 HC RX IP 250 OP 636: Performed by: NURSE PRACTITIONER

## 2023-11-27 PROCEDURE — 83735 ASSAY OF MAGNESIUM: CPT | Performed by: INTERNAL MEDICINE

## 2023-11-27 PROCEDURE — 73610 X-RAY EXAM OF ANKLE: CPT | Mod: LT

## 2023-11-27 PROCEDURE — 80053 COMPREHEN METABOLIC PANEL: CPT

## 2023-11-27 PROCEDURE — 120N000002 HC R&B MED SURG/OB UMMC

## 2023-11-27 RX ORDER — MAGNESIUM SULFATE HEPTAHYDRATE 40 MG/ML
4 INJECTION, SOLUTION INTRAVENOUS ONCE
Qty: 100 ML | Refills: 0 | Status: COMPLETED | OUTPATIENT
Start: 2023-11-27 | End: 2023-11-27

## 2023-11-27 RX ADMIN — Medication 2.5 MG: at 13:39

## 2023-11-27 RX ADMIN — BACLOFEN 20 MG: 20 TABLET ORAL at 10:30

## 2023-11-27 RX ADMIN — HEPARIN SODIUM 5000 UNITS: 5000 INJECTION, SOLUTION INTRAVENOUS; SUBCUTANEOUS at 06:37

## 2023-11-27 RX ADMIN — HEPARIN SODIUM 5000 UNITS: 5000 INJECTION, SOLUTION INTRAVENOUS; SUBCUTANEOUS at 13:40

## 2023-11-27 RX ADMIN — HEPARIN SODIUM 5000 UNITS: 5000 INJECTION, SOLUTION INTRAVENOUS; SUBCUTANEOUS at 21:26

## 2023-11-27 RX ADMIN — Medication 600 MG: at 13:28

## 2023-11-27 RX ADMIN — HYDROCHLOROTHIAZIDE 12.5 MG: 12.5 TABLET ORAL at 10:33

## 2023-11-27 RX ADMIN — ACYCLOVIR 400 MG: 400 TABLET ORAL at 20:25

## 2023-11-27 RX ADMIN — Medication 2.5 MG: at 07:27

## 2023-11-27 RX ADMIN — ACYCLOVIR 400 MG: 400 TABLET ORAL at 10:30

## 2023-11-27 RX ADMIN — MAGNESIUM SULFATE HEPTAHYDRATE 4 G: 40 INJECTION, SOLUTION INTRAVENOUS at 12:47

## 2023-11-27 RX ADMIN — Medication 1 CAPSULE: at 10:34

## 2023-11-27 RX ADMIN — LEVOTHYROXINE SODIUM 100 MCG: 100 TABLET ORAL at 09:27

## 2023-11-27 RX ADMIN — OXYBUTYNIN CHLORIDE 10 MG: 10 TABLET, EXTENDED RELEASE ORAL at 10:35

## 2023-11-27 RX ADMIN — GABAPENTIN 300 MG: 300 CAPSULE ORAL at 10:33

## 2023-11-27 RX ADMIN — Medication 1 CAPSULE: at 20:25

## 2023-11-27 RX ADMIN — LATANOPROST 1 DROP: 50 SOLUTION OPHTHALMIC at 10:25

## 2023-11-27 RX ADMIN — BACLOFEN 30 MG: 20 TABLET ORAL at 21:26

## 2023-11-27 RX ADMIN — MULTIPLE VITAMINS W/ MINERALS TAB 1 TABLET: TAB at 20:25

## 2023-11-27 RX ADMIN — GABAPENTIN 300 MG: 300 CAPSULE ORAL at 20:25

## 2023-11-27 RX ADMIN — DULOXETINE 80 MG: 60 CAPSULE, DELAYED RELEASE ORAL at 10:32

## 2023-11-27 RX ADMIN — ACETAMINOPHEN 1000 MG: 325 TABLET, FILM COATED ORAL at 10:28

## 2023-11-27 RX ADMIN — FAMOTIDINE 40 MG: 20 TABLET ORAL at 21:26

## 2023-11-27 RX ADMIN — Medication 2.5 MG: at 20:53

## 2023-11-27 ASSESSMENT — ACTIVITIES OF DAILY LIVING (ADL)
ADLS_ACUITY_SCORE: 47
ADLS_ACUITY_SCORE: 45
ADLS_ACUITY_SCORE: 47
ADLS_ACUITY_SCORE: 49
ADLS_ACUITY_SCORE: 47
ADLS_ACUITY_SCORE: 47
ADLS_ACUITY_SCORE: 45
ADLS_ACUITY_SCORE: 45

## 2023-11-27 NOTE — PLAN OF CARE
VS: VSS. Denies CP/SOB. Alert, confused intermittently. Word finding difficulty   O2: >90% on RA    Output: Voiding adequate amounts w/o pain or difficulty, using purewick   Last BM:    Activity:     Skin:    Pain:    CMS:    Dressing:    Diet: Regular, fair appetite. Needs help ordering meals.   LDA: PIV SL   Equipment:    Plan:    Additional Info: RN managed-   Potassium WNL  Magnesium  Phosphorus WNL

## 2023-11-27 NOTE — PROGRESS NOTES
CLINICAL NUTRITION SERVICES - REASSESSMENT NOTE     Nutrition Prescription    RECOMMENDATIONS FOR MDs/PROVIDERS TO ORDER:  None    Malnutrition Status:    Moderate malnutrition in the context of acute illness    Recommendations already ordered by Registered Dietitian (RD):  Encouraged intakes, use of supplements  Changed supplement order to allow for substitutions    Future/Additional Recommendations:  Monitor labs, intakes, and weight trends.     EVALUATION OF THE PROGRESS TOWARD GOALS   Diet: Regular  Intake/[place]: mostly 25 - 75% of documented meals over last week.  Snacks/supplements: Ensure Enlive daily + PRN    Pt ordering (on 5-day average) 1848 kcal and 79 g protein per day per [employer]. With documented intakes, pt is likely meeting ~70% minimum energy and protein needs.     NEW FINDINGS/REVIEW OF SYSTEMS    Nutrition/GI: RD met with pt and  in room. Patient's  reports that the patient eats very small amounts of her foods. Patient repots that she eats what she wants. Encouraged small frequent meals and use of supplements and floor stock foods. Patient stated she knew what she was having for dinner and when and did not need to eat anything else today. Patient states she enjoys her supplements but they are not coming anymore. Explained stocking issues and offered alternatives to Ensure Enlive. Patient agreeable to trying other options as long as they are chocolate and similar to what she is getting. Otherwise, patient with no questions or concerns at this time.     Weights: Pt with limited weight history prior to admission, unsure of accuracy of weights 11/17-11/18 during admission as they are all bed scale weights and pt likely did not gain 12 lb in 1 day. If not accurate, pt with non-significant weight loss over 7 months.  Wt Readings from Last Encounters:   11/25/23 62.6 kg (138 lb 0.1 oz) - bed scale   11/22/23 61.1 kg (134 lb 11.2 oz) - bed scale   11/20/23 61.3 kg (135 lb 3.2 oz) -  bed scale   11/19/23 60.5 kg (133 lb 6.4 oz) - bed scale   11/18/23 54.9 kg (121 lb 0.5 oz) - bed scale   11/17/23 54.6 kg (120 lb 5.9 oz) - bed scale   10/19/23 63.503 kg (140 lb) - Care everywhere   09/05/23 65.771 kg (145 lb) - Care everywhere   04/26/23 66.1 kg (145 lb 11.2 oz)   03/17/23 67.6 kg (149 lb)   03/31/22 59 kg (130 lb)   02/24/22 59 kg (130 lb)   01/31/22 59 kg (130 lb)     Skin: pressure wound heel     Labs reviewed   11/24/23 05:47 11/25/23 04:04 11/26/23 07:49 11/27/23 06:31   Sodium 135 137 137 132 (L)   Potassium 4.2 4.2 4.1 4.1   Urea Nitrogen 10.3 13.4 13.0 12.3   Creatinine 0.72 0.77 0.72 0.75   Magnesium 1.6 (L) 1.6 (L) 1.8 1.3 (L)   Phosphorus 3.0 3.6 3.7 3.2   Albumin 2.9 (L) 2.9 (L) 3.2 (L) 3.1 (L)   Glucose 82 99 92 93      Medications reviewed: oscal, cholestyramine, pepcid, hydrochlorothiazide, culturell, levothyroxine, mag sulfate, multivitamin    MALNUTRITION  % Intake: < 75% for > 7 days (moderate)  % Weight Loss:  Difficult to assess with lack of weight history and likely inaccurate weights  Subcutaneous Fat Loss: Facial/Jaw Region mild  Muscle Loss: Temporal moderate and Facial/jaw region moderate - vs anticipated with chronic MS  Fluid Accumulation/Edema: Mild  Malnutrition Diagnosis: Moderate malnutrition in the context of acute illness    Previous Goals   Patient to consume % of nutritionally adequate meal trays TID, or the equivalent with supplements/snacks.  Evaluation: Almost met    Previous Nutrition Diagnosis  Inadequate oral intake related to mechanical ventilation status as evidenced by NPO with pt requiring enteral nutrition support to meet estimated nutritional needs   Evaluation: No longer applicable, nutrition diagnosis changed below    CURRENT NUTRITION DIAGNOSIS  Inadequate oral intake related to varied appetite and previous modified diet as evidenced by patient report, documented intakes, evident muscle/fat wasting.      INTERVENTIONS  Implementation  Nutrition education for nutrition relationship to health/disease   Medical food supplement therapy - Updated to allow  substitutions    Goals  Patient to consume % of nutritionally adequate meal trays TID, or the equivalent with supplements/snacks.    Monitoring/Evaluation  Progress toward goals will be monitored and evaluated per protocol.    Mallory Gilbert MS, RDN, LD  RD pager: [phone], or [employer] Karla  [facility] Weekend/Holiday Pager: [phone]   jaden

## 2023-11-27 NOTE — PROGRESS NOTES
Care Management Follow Up    Length of Stay (days): 10    Expected Discharge Date:       Concerns to be Addressed: discharge planning     Patient plan of care discussed at interdisciplinary rounds: Yes    Anticipated Discharge Disposition:  home with home PT     Anticipated Discharge Services:  TBD  Anticipated Discharge DME:  Lift, hospital bed, commode    Patient/family educated on Medicare website which has current facility and service quality ratings:  Not at this time  Education Provided on the Discharge Plan: Yes   Patient/Family in Agreement with the Plan: Family requesting to talk to therapies about recommendations    Referrals Placed by CM/SW:    Private pay costs discussed: Not applicable    Additional Information:    Met with pt and spouse at bedside to answer question and concerns related to discharge planning. SW discussed current therapies recommendations and explained why TCU was not being recommended. Spouse would like to speak to therapies directly. Spouse is not interested in LTC and would not like to discuss this option further. Spouse inquired on equipment, informing a lift and hospital bed would need to be ordered for pt to discharge home. SW will follow up with RNCC regarding this need. SW provided information on home PT, informed RNCC would assist in getting home care set up for pt. SW provided information on private pay caregivers, family unsure if they want to pursue private pay caregivers but will inform if a list is needed.    Left VM for therapies requesting they reach out to spouse to explain why TCU is not being recommended at this time.  Therapies informed they anticipate pt needing a lift, bed, commode, but will update with further recommendations as needed.    RNCC updated family prefers home rather than LTC.    STEPHANY Stephenson BSW  [facility]   Covering [facility]  [phone]

## 2023-11-27 NOTE — PROGRESS NOTES
Care Management Follow Up    Length of Stay (days): 10    Expected Discharge Date:       Concerns to be Addressed: discharge planning     Patient plan of care discussed at interdisciplinary rounds: Yes    Anticipated Discharge Disposition:  Home     Anticipated Discharge Services:  Home care for RN/PT/OT/HHA  Anticipated Discharge DME: Commode, Bed, Other (see comment) (lift)    Patient/family educated on Medicare website which has current facility and service quality ratings:    Education Provided on the Discharge Plan:  yes  Patient/Family in Agreement with the Plan: yes    Referrals Placed by CM/SW:  Home care  Private pay costs discussed: Not applicable    Additional Information:  Per team, pt and family prefer discharge to home. PT recommends pt will need a hospital bed, commode and lift. There will be a care conference tomorrow to determine any further needs.     Referral placed for home care RN/PT/OT/HHA with [facility] and home care referral order placed.     Veronica Sanchez RNCC  Covering for [facility]  Phone [phone]  Pager [phone]      SEARCHABLE in [facility] - search CARE COORDINATOR      [place] & [place] ([phone]) Saturday & Sunday; [phone]) [facility] Recognized Holidays    Weekend Pager--  Units: 5A, 5B & 5C  Pager: [phone]  Units: 6B, 6C & 6D    Pager: [phone]  Units: 7A, 7B, 7C & 7D    Pager: [phone]  Units: 6A & ICU   Pager: [phone]  Units: 5 Ortho, 5MS & WB ED Pager: [phone]  Units: 6MS, 8A & 10 ICU  Pager [phone]

## 2023-11-27 NOTE — PROGRESS NOTES
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 On 11/17 a rapid response was called as patient was found to be obtunded and transferred to the ICU after intubation for airway protection. Acute mental status change and respiratory failure thought likely multifactorial including narcotic pain medication, acute infection (UTI), and acute on chronic hypercapneic respiratory failure. Extubated to room air 11/21, and transferred to the general medical floor 11/22.     Today:  - XR ordered today per Ortho for routine follow up   - Continues to improve. Medically stable for discharge once plan determined with family between LTC and home with medical equipment/home care. Appreciate SW/RNCC assistance.     PLAN:     # Toxic metabolic encephalopathy, multifactorial including hypercapnea, infection, and medication toxicity, improving   # C/f non-convulsive seizures (ruled out)  Rapid response was called 11/17 as patient was found to be obtunded and non-arousable. Attempts to arousing patient including Narcan were unsuccessful and was later transferred to the ICU. With concerns for protecting her airway, patient was intubated. Likely a combination of acute on chronic hypercapnea, opioid toxicity, and infection from UTI. Head CT and Brain MRI on 11/19 without acute pathology. Mental status improving but not yet back to baseline.   - Delirium precautions   - Hold pta Nuvigil for sleep, to reduce AMS for now  - Slow re-introduction of home medications   - Neurology  consulted, now signed off. They did not appreciate any seizures on EEG. If mental status does not improve they can be reconsulted, and recommend repeat MRI of the head in one week to evaluate for anoxic brain injury (though unlikely)   - EEG 11/20: When sedation was in use, moderate to severe encephalopathy noted. When sedation was not in use, moderate diffuse encephalopathy was noted.  - Careful use of opiate and other sedating medications     # Progressive Multiple Sclerosis (wheelchair bound)   Patient has longstanding diagnosis of progressive MS now wheelchair bound. Follows with Immunology and Neurology outpatient. Last brain MRI in 2017 showing suspicion of demyelinating disease. Repeat brain MRI on 11/19/23 showed scattered T2 hyperintense foci throughout the subcorical and periventricular white matter grossly unchanged when compared to MRI on 11/1/2017. Unsure of exact biologics patient has was on, appears she has received Lemtrada (2017, 2019), and other past DMTs included Betaseron, Aubagio.   - Resume PTA dose of Baclofen 20 mg QAM and 30 mg at bedtime  - Restart PTA gabapentin at lower dose, 300 mg BID. At home takes 600 mg QID.   - Resume pta oxybutynin for neurogenic bladder    # Two outpatient mechanical falls   # Right distal femur fracture and left malleolus fracture  # Osteoporosis   - Orthopedics following  - NWB on bilateral lower extremities  - R leg long cast, L leg PRAFO   - Monitor pulses  - Oxycodone 2.5 mg Q6 hours, monitor for over sedation  - PT consulted  - Follow up with Dr. Em week of 12/11/23      # Major Depressive Disorder   - Continue Cymbalta     # Sleep apnea 2/2 MS   Appears patient follows with sleep clinic/doctor and found to have sleep apnea 2/2 her MS   - Wears a CPAP at home   - Wear CPAP at  now that she is extubated     # Tobacco use; current smoker      # Hypercapnia - Resolved  # History of pulmonary nodules   Recent CT Chest from 6/16/23 shows right lower  lobe nodule has resolved with a stable left lower lobe nodule.   - Now on yearly chest CT follow-ups      # Hx of hypertension  Patient has been on/off levophed. Has received a total of 1.5L of crystalloid.  - Resume PTA hydrochlorothiazide     # GERD  # risk for malnutrition  - pantoprazole 40 mg suspension daily  - RD consulted, following   - Speech consulted, following  - Cleared for regular diet with thin liquids      # DENNISE (resolved)  # CKD   # Recurrent UTI   # Hypophosphatemia  Patients baseline creatinine around 0.96-1.08. DENNISE with CKD concerns for any sedating related medications could have caused patient to become obtunded. Confirmed UTI as below; previous recurrent UTI with klebsiella pneuomiae as well.   - Reduce nephrotoxic agents as able   - Monitor I/Os   - UC growing Klebsiella pneumoniae 11/19  - s/p ceftriaxone 1g q24h x 7 days total  - Daily BMP  - Mg, K, Phos protocols     # Hx Graves s/p thyroid ablation   - TSH 0.64   - Continue pta synthroid 100 mcg daily   - Hypoglycemia protocol     # Herpes simplex virus; prophylaxis on Acyclovir   - Continue pta Acyclovir 400 mg (Changed to IV for now and can change back to tablet once swallow is evaluated and passed)     # Recurrent UTIs  Suspect AMS related to recurrent UTI  - Stopped PTA macrobid  - S/p ceftriaxone 1g q24h X 7 days  - UC with Klebsiella pneumoniae 10/7 and 11/19  - NGTD 11/19 on blood cx      # Anemia of chronic disease 2/2 CKD   # Thrombocytopenia   Hgb 6.2 11/19. Was given 1 unit PRBCs.   - hemoglobin stable  - Hold pta ferrous sulfate  - Daily CBC      # Ductal carcinoma in situ of left breast   - Follows with outpatient radiation/oncology and medical oncology annually            Diet: Snacks/Supplements Adult: Ensure Enlive; With Meals  Room Service  Combination Diet Regular Diet; Thin Liquids (level 0)    DVT Prophylaxis: Heparin SQ  Verduzco Catheter: Not present  Lines: None     Cardiac Monitoring: None  Code Status: Full Code       Clinically Significant Risk Factors           # Hypercalcemia: corrected calcium is >10.1, will monitor as appropriate  # Hypomagnesemia: Lowest Mg = 1.3 mg/dL in last 2 days, will replace as needed   # Hypoalbuminemia: Lowest albumin = 2.7 g/dL at 11/21/2023  4:59 AM, will monitor as appropriate                # Financial/Environmental Concerns: none         Disposition Plan             Domonique Lao MD  Hospitalist Service, GOLD TEAM 21  [facility]  Securely message with [employer] (more info)  Text page via [facility] Paging/Directory   See signed in provider for up to date coverage information  ______________________________________________________________________    Interval History   Doing well this morning. Pain has been better controlled. Looking forward to going home. Denies any nausea, has good appetite.     Physical Exam   Vital Signs: Temp: 98.1  F (36.7  C) Temp src: Oral BP: 112/61 Pulse: 98   Resp: 16 SpO2: 97 % O2 Device: None (Room air)    Weight: 138 lbs .13 oz    General: Sleeping, wakes to voice. Pleasant and interactive.  Mouth: edentulous   CV: Normal rate and rhythm, normal S1 and S2. No murmurs, rubs, gallops.   Respiratory: Lungs clear to auscultation bilaterally. No wheezing, rhonchi, or rales.  Abdomen: Soft, non-distended. Non-tender to palpation. No rebound tenderness or guarding. +BS.   MSK: RLE casted. Left lower extremity in boot. Joint contractures.   Extremities: Trace edema of the feet bilaterally  Skin: Warm, dry. No rash on visible skin.   Neuro: Alert. Face symmetric, speech slow but intact. Oriented to person and place. Answers questions appropriately.       Medical Decision Making       45 MINUTES SPENT BY ME on the date of service doing chart review, history, exam, documentation & further activities per the note.      Data     I have personally reviewed the following data over the past 24 hrs:    5.2  \   9.7 (L)   / 251     132 (L)  97 (L) 12.3 /  93   4.1 29 0.75 \     ALT: 15 AST: 21 AP: 72 TBILI: 0.4   ALB: 3.1 (L) TOT PROTEIN: 6.0 (L) LIPASE: N/A       Imaging results reviewed over the past 24 hrs:   No results found for this or any previous visit (from the past 24 hour(s)).

## 2023-11-27 NOTE — PROGRESS NOTES
"Orthopedic Surgery Progress Note: 11/27/2023    Subjective:   No acute events overnight. Pain well-controlled. No concerns this AM.     Objective:   /64 (BP Location: Right arm)   Pulse 85   Temp 98.8  F (37.1  C) (Oral)   Resp 16   Ht 1.727 m (5' 8\")   Wt 62.6 kg (138 lb 0.1 oz)   LMP  (LMP Unknown)   SpO2 96%   BMI 20.98 kg/m    I/O this shift:  In: -   Out: 600 [Urine:600]  General: NAD. Resting comfortably in bed.  Respiratory: Nonlabored breathing  Musculoskeletal:  RLE  Cast in place, well fitting.  No skin breakdown around edges. Dried stool on proximal medial cast. Moves toes. Sensation intact in the toes.   Toes WWP  LLE: AFO in place. 2+ DP pulse. No skin breakdown. Wiggles toes.       Laboratory Data:  Lab Results   Component Value Date    WBC 5.2 11/27/2023    HGB 9.7 (L) 11/27/2023     11/27/2023    INR 1.17 (H) 11/19/2023         Assessment & Plan:   Marylee Woods is a 68 year old female who sustained a fall from her wheelchair on 11/16 and was found to have a right distal femur fracture and minimally displaced left medial malleolus fracture. Plan to treat both injuries non operatively, right leg in a long leg cast and left ankle in CAM boot. While intubated/sedated no need for CAM boot on left leg due to concern for skin breakdown. Please check for skin breakdown around edges of cast and contact orthopedic surgery for any concerns.      Plan for Today:  - Skin check today  - Disposition planning  - Xrays of the right knee and left ankle    Medicine Primary  Activity: NWB BLE  Weight bearing status: NWB BLE. Lift for transfers.   Antibiotics: none  Diet: per primary   DVT prophylaxis: per primary   Bracing/Splinting: right leg long leg cast to be kept clean dry and intact. Left leg in PRAFO to avoid sores from CAM.  X-rays: post casting films right leg pending   Physical Therapy: ADL's  Occupational Therapy: ADL's  Follow-up: Will plan to have her follow-up with Dr. Em " week of 12/11/23 if discharged, if inpatient will reevaluate in the hospital the same week.      Disposition: Pending discussion with family, LTC vs home with home care.     Orthopedic surgery staff for this patient is Dr. Em.    ------------------------------------------------------------------------------------------    Respectfully,    Anirudh Villarreal MD  Orthopedic Surgery PGY1  [phone]    Please page me directly with any questions/concerns during regular weekday hours before 5 pm. If there is no response, if it is a weekend, or if it is during evening hours then please page the orthopedic surgery resident on call.      FOLLOWUP:    Future Appointments   Date Time Provider Department Center   11/27/2023 11:00 AM Mi Hernandez, PT [facility] [place]   11/30/2023  7:00 PM UR OT WAITLIST [facility] [place]   1/29/2024  8:30 AM Neli Bean MD [facility]   2/14/2024  2:00 PM Carolina Pulliam MD [facility]   2/28/2024 11:30 AM Carolina Pulliam MD [facility]

## 2023-11-27 NOTE — PLAN OF CARE
Pt is A&Ox4. VSS. LS clear, on RA. BS active, LBM on 11/26/2023. Pt is incontinent of bowel and bladder. Purewick in place. Pain managed with 2.5mg oxycodone. Pt up with 2 assist with lift. RLE has hard cast. LLE has a ankle brace. Continue to monitor.

## 2023-11-28 ENCOUNTER — APPOINTMENT (OUTPATIENT)
Dept: PHYSICAL THERAPY | Facility: CLINIC | Age: 68
DRG: 533 | End: 2023-11-28
Attending: PEDIATRICS
Payer: MEDICARE

## 2023-11-28 LAB
ALBUMIN SERPL BCG-MCNC: 3.1 G/DL (ref 3.5–5.2)
ALP SERPL-CCNC: 79 U/L (ref 40–150)
ALT SERPL W P-5'-P-CCNC: 11 U/L (ref 0–50)
ANION GAP SERPL CALCULATED.3IONS-SCNC: 9 MMOL/L (ref 7–15)
AST SERPL W P-5'-P-CCNC: 16 U/L (ref 0–45)
BILIRUB SERPL-MCNC: 0.3 MG/DL
BUN SERPL-MCNC: 13.9 MG/DL (ref 8–23)
CALCIUM SERPL-MCNC: 9.4 MG/DL (ref 8.8–10.2)
CHLORIDE SERPL-SCNC: 97 MMOL/L (ref 98–107)
CREAT SERPL-MCNC: 0.85 MG/DL (ref 0.51–0.95)
DEPRECATED HCO3 PLAS-SCNC: 28 MMOL/L (ref 22–29)
EGFRCR SERPLBLD CKD-EPI 2021: 74 ML/MIN/1.73M2
ERYTHROCYTE [DISTWIDTH] IN BLOOD BY AUTOMATED COUNT: 16.7 % (ref 10–15)
GLUCOSE SERPL-MCNC: 100 MG/DL (ref 70–99)
HCT VFR BLD AUTO: 28.7 % (ref 35–47)
HGB BLD-MCNC: 9.6 G/DL (ref 11.7–15.7)
MAGNESIUM SERPL-MCNC: 1.8 MG/DL (ref 1.7–2.3)
MAGNESIUM SERPL-MCNC: 2.6 MG/DL (ref 1.7–2.3)
MCH RBC QN AUTO: 29.8 PG (ref 26.5–33)
MCHC RBC AUTO-ENTMCNC: 33.4 G/DL (ref 31.5–36.5)
MCV RBC AUTO: 89 FL (ref 78–100)
PHOSPHATE SERPL-MCNC: 3.3 MG/DL (ref 2.5–4.5)
PLATELET # BLD AUTO: 270 10E3/UL (ref 150–450)
POTASSIUM SERPL-SCNC: 4.4 MMOL/L (ref 3.4–5.3)
PROT SERPL-MCNC: 6.1 G/DL (ref 6.4–8.3)
RBC # BLD AUTO: 3.22 10E6/UL (ref 3.8–5.2)
SODIUM SERPL-SCNC: 134 MMOL/L (ref 135–145)
WBC # BLD AUTO: 5.8 10E3/UL (ref 4–11)

## 2023-11-28 PROCEDURE — 120N000002 HC R&B MED SURG/OB UMMC

## 2023-11-28 PROCEDURE — 84100 ASSAY OF PHOSPHORUS: CPT | Performed by: STUDENT IN AN ORGANIZED HEALTH CARE EDUCATION/TRAINING PROGRAM

## 2023-11-28 PROCEDURE — 250N000011 HC RX IP 250 OP 636: Performed by: NURSE PRACTITIONER

## 2023-11-28 PROCEDURE — 85027 COMPLETE CBC AUTOMATED: CPT

## 2023-11-28 PROCEDURE — 97110 THERAPEUTIC EXERCISES: CPT | Mod: GP

## 2023-11-28 PROCEDURE — 83735 ASSAY OF MAGNESIUM: CPT | Performed by: INTERNAL MEDICINE

## 2023-11-28 PROCEDURE — 250N000013 HC RX MED GY IP 250 OP 250 PS 637

## 2023-11-28 PROCEDURE — 250N000013 HC RX MED GY IP 250 OP 250 PS 637: Performed by: STUDENT IN AN ORGANIZED HEALTH CARE EDUCATION/TRAINING PROGRAM

## 2023-11-28 PROCEDURE — 80053 COMPREHEN METABOLIC PANEL: CPT

## 2023-11-28 PROCEDURE — 999N000157 HC STATISTIC RCP TIME EA 10 MIN

## 2023-11-28 PROCEDURE — 250N000013 HC RX MED GY IP 250 OP 250 PS 637: Performed by: INTERNAL MEDICINE

## 2023-11-28 PROCEDURE — 97530 THERAPEUTIC ACTIVITIES: CPT | Mod: GP

## 2023-11-28 PROCEDURE — 36415 COLL VENOUS BLD VENIPUNCTURE: CPT | Performed by: INTERNAL MEDICINE

## 2023-11-28 PROCEDURE — G0463 HOSPITAL OUTPT CLINIC VISIT: HCPCS

## 2023-11-28 PROCEDURE — 250N000013 HC RX MED GY IP 250 OP 250 PS 637: Performed by: PEDIATRICS

## 2023-11-28 PROCEDURE — 36416 COLLJ CAPILLARY BLOOD SPEC: CPT

## 2023-11-28 PROCEDURE — 99232 SBSQ HOSP IP/OBS MODERATE 35: CPT | Performed by: INTERNAL MEDICINE

## 2023-11-28 RX ADMIN — HEPARIN SODIUM 5000 UNITS: 5000 INJECTION, SOLUTION INTRAVENOUS; SUBCUTANEOUS at 06:11

## 2023-11-28 RX ADMIN — DULOXETINE 80 MG: 60 CAPSULE, DELAYED RELEASE ORAL at 09:32

## 2023-11-28 RX ADMIN — Medication 1 CAPSULE: at 19:59

## 2023-11-28 RX ADMIN — HEPARIN SODIUM 5000 UNITS: 5000 INJECTION, SOLUTION INTRAVENOUS; SUBCUTANEOUS at 15:05

## 2023-11-28 RX ADMIN — Medication 1 CAPSULE: at 09:33

## 2023-11-28 RX ADMIN — FERROUS SULFATE TAB 325 MG (65 MG ELEMENTAL FE) 325 MG: 325 (65 FE) TAB at 09:33

## 2023-11-28 RX ADMIN — ACETAMINOPHEN 1000 MG: 325 TABLET, FILM COATED ORAL at 09:33

## 2023-11-28 RX ADMIN — Medication 2.5 MG: at 04:26

## 2023-11-28 RX ADMIN — MULTIPLE VITAMINS W/ MINERALS TAB 1 TABLET: TAB at 19:59

## 2023-11-28 RX ADMIN — Medication 2.5 MG: at 17:29

## 2023-11-28 RX ADMIN — HEPARIN SODIUM 5000 UNITS: 5000 INJECTION, SOLUTION INTRAVENOUS; SUBCUTANEOUS at 22:13

## 2023-11-28 RX ADMIN — BACLOFEN 30 MG: 20 TABLET ORAL at 22:13

## 2023-11-28 RX ADMIN — LATANOPROST 1 DROP: 50 SOLUTION OPHTHALMIC at 09:46

## 2023-11-28 RX ADMIN — ACYCLOVIR 400 MG: 400 TABLET ORAL at 22:13

## 2023-11-28 RX ADMIN — LEVOTHYROXINE SODIUM 100 MCG: 100 TABLET ORAL at 06:11

## 2023-11-28 RX ADMIN — BACLOFEN 20 MG: 20 TABLET ORAL at 09:33

## 2023-11-28 RX ADMIN — Medication 2.5 MG: at 11:10

## 2023-11-28 RX ADMIN — GABAPENTIN 300 MG: 300 CAPSULE ORAL at 19:59

## 2023-11-28 RX ADMIN — Medication 600 MG: at 09:33

## 2023-11-28 RX ADMIN — HYDROCHLOROTHIAZIDE 12.5 MG: 12.5 TABLET ORAL at 09:33

## 2023-11-28 RX ADMIN — GABAPENTIN 300 MG: 300 CAPSULE ORAL at 09:33

## 2023-11-28 RX ADMIN — FAMOTIDINE 40 MG: 20 TABLET ORAL at 22:13

## 2023-11-28 RX ADMIN — ACETAMINOPHEN 1000 MG: 325 TABLET, FILM COATED ORAL at 19:59

## 2023-11-28 RX ADMIN — CHOLESTYRAMINE 4 G: 4 POWDER, FOR SUSPENSION ORAL at 19:59

## 2023-11-28 RX ADMIN — OXYBUTYNIN CHLORIDE 10 MG: 10 TABLET, EXTENDED RELEASE ORAL at 09:33

## 2023-11-28 RX ADMIN — ACYCLOVIR 400 MG: 400 TABLET ORAL at 09:33

## 2023-11-28 ASSESSMENT — ACTIVITIES OF DAILY LIVING (ADL)
ADLS_ACUITY_SCORE: 53
ADLS_ACUITY_SCORE: 49
ADLS_ACUITY_SCORE: 49
ADLS_ACUITY_SCORE: 53
ADLS_ACUITY_SCORE: 49
ADLS_ACUITY_SCORE: 53

## 2023-11-28 NOTE — PROGRESS NOTES
[facility]  WO Nurse Inpatient Assessment     Consulted for: Left heel pressure injury    Patient History (according to provider note(s):      Per Dr Anirudh Villarreal on 11/20/2023: Marylee Woods is a 68 year old female who sustained a fall from her wheelchair on 11/16 and was found to have a right distal femur fracture and minimally displaced left medial malleolus fracture. Plan to treat both injuries non operatively, right leg in a long leg cast and left ankle in CAM boot. While intubated/sedated no need for CAM boot on left leg due to concern for skin breakdown. Please check for skin breakdown around edges of cast and contact orthopedic surgery for any concerns.      Assessment:      Areas visualized during today's visit: Left foot and ankle    Pressure Injury Location: Left heel    11/20 11/24 11/28  Last photo: 11/28/2023  Wound type: Pressure Injury     Pressure Injury Stage: 2, present on admission   Wound history/plan of care:   Present on admission    Wound base: 100 % non-blanchable, erythema, and blister     Palpation of the wound bed: normal      Drainage: none     Description of drainage: none     Measurements (length x width x depth, in cm) 5  x 4  x  0 cm      Tunneling N/A     Undermining N/A  Periwound skin: Intact      Color: pink      Temperature: normal   Odor: none  Pain: no grimacing or signs of discomfort  Pain intervention prior to dressing change: slow and gentle cares   Treatment goal: Heal  and Protection  STATUS: healing  Supplies ordered: supplies stored on unit    Treatment Plan:     Left medial heel pressure injury: Every 3 days: Remove dressing and wash area with saline and gauze. Pat dry. Cover area with Mepilex Flex. Keep heel off-loaded off of mattress with pillows.      Orders: Reviewed    RECOMMEND PRIMARY TEAM ORDER: None, at this time  Education provided: plan of care  Discussed plan of care with: Nurse  WO nurse follow-up plan:  weekly  Notify [facility] if wound(s) deteriorate.  Nursing to notify the Provider(s) and re-consult the WO Nurse if new skin concern.    DATA:     Current support surface: Standard  Low air loss (MISTY pump, Isolibrium, Pulsate, skin guard, etc)  Containment of urine/stool: Indwelling catheter  BMI: Body mass index is 20.98 kg/m .   Active diet order: Orders Placed This Encounter      Combination Diet Regular Diet; Thin Liquids (level 0)     Output: I/O last 3 completed shifts:  In: 290 [P.O.:290]  Out: 875 [Urine:875]     Labs:   Recent Labs   Lab 11/28/23  0539   ALBUMIN 3.1*   HGB 9.6*   WBC 5.8     Pressure injury risk assessment:   Sensory Perception: 4-->no impairment  Moisture: 3-->occasionally moist  Activity: 2-->chairfast  Mobility: 2-->very limited  Nutrition: 3-->adequate  Friction and Shear: 2-->potential problem  Broderick Score: 16    Marguerite Reyes RN CWOCN  Pager no longer is use, please contact through [employer] group: [facility]  Dept. Office Number: *[phone]

## 2023-11-28 NOTE — PROGRESS NOTES
"Documentation of Face to Face and Certification for DME    Miscellaneous DME Order      Order Details Per Staff Recommendation - Signature Required - Ordering Jennifer Porter RN at 11/28/2023  4:12 PM   Diagnoses: MS (multiple sclerosis) (H); Other closed fracture of distal end of right femur, initial encounter (H)   Reason For Exam  Priority: Routine  Dx: MS (multiple sclerosis) (H) [G35 (ICD-10-CM)]; Other closed fracture of distal end of right femur, initial encounter (H) [S72.491A (ICD-10-CM)]   Comments: DME Documentation:  Describe the reason for need to support medical necessity: Multiple Sclerosis, Bilateral Broken Lower Extremities.    PATIENT LIFT QUESTION:    Does the patient need Karyn to transfer between bed, chair, wheelchair, and commode or requires assistance of more than one person: Yes  Without the use of this lift patient would be bed confined?  Yes    I, the undersigned, certify that the above prescribed supplies are medically necessary for this patient and is both reasonable and necessary in reference to accepted standards of medical and necessary in reference to accepted standards of medical practice in the treatment of this patient's condition and is not prescribed as a convenience.     Hospital Bed      Order Details Per Staff Recommendation - Signature Required - Ordering Jennifer Porter RN at 11/28/2023  4:12 PM   Diagnoses: MS (multiple sclerosis) (H); Other closed fracture of distal end of right femur, initial encounter (H)   Reason For Exam  Priority: Routine  Dx: MS (multiple sclerosis) (H) [G35 (ICD-10-CM)]; Other closed fracture of distal end of right femur, initial encounter (H) [S72.491A (ICD-10-CM)]   Comments: Hospital Bed Documentation:  Hospital bed is required for body positioning, to allow for safe transfers to wheelchair and standing and frequent changes in body position, not feasible in an ordinary bed     NOTE: Patient must have a \"Yes\" in one of the four following " questions to qualify for a hospital bed.    1. Does the patient require positioning of the body in ways not feasible with an ordinary bed due to a medical condition that is expected to last at least 1 month? Yes (Please explain):   multiple sclerosis, graves disease with osteoporosis, after two falls now with a right distal femur fracture and left malleolus fracture    2. Does the patient require, for the alleviation of pain, positioning of the body in ways not feasible with an ordinary bed? Yes (Please explain): multiple sclerosis with right distal femur fracture and left malleolus fracture     3. Does the patient require the head of the bed to be elevated more than 30 degrees most of the time due to congestive heart failure, chronic pulmonary disease, or aspiration? No    4. Does the patient require traction that can only be attached to a hospital bed? No    Additional Criteria:    Does the patient require frequent changes in body position and/or have an immediate need for change in body position? Yes - Patient qualifies for Semi Electric Bed     Trapeze Criteria:  (Patient must meet standard hospital bed criteria also)  1. Does patient need this device to sit up because of a respiratory condition, for change in body position for other medical reasons, or to get in or out of bed? No    I, the undersigned, certify that the above prescribed supplies are medically necessary for this patient and is both reasonable and necessary in reference to accepted standards of medical and necessary in reference to accepted standards of medical practice in the treatment of this patient's condition and is not prescribed as a convenience.     Commode      Order Details Per Staff Recommendation - Signature Required - Ordering Jennifer Porter RN at 11/28/2023  4:14 PM   Diagnoses: MS (multiple sclerosis) (H); Other closed fracture of distal end of right femur, initial encounter (H)   Reason For Exam  Priority: Routine  Dx: MS  (multiple sclerosis) (H) [G35 (ICD-10-CM)]; Other closed fracture of distal end of right femur, initial encounter (H) [S72.491A (ICD-10-CM)]   Comments: DME Documentation:  Describe the reason for need to support medical necessity: multiple sclerosis,  right distal femur fracture and left malleolus fracture.    I, the undersigned, certify that the above prescribed supplies are medically necessary for this patient and is both reasonable and necessary in reference to accepted standards of medical and necessary in reference to accepted standards of medical practice in the treatment of this patient's condition and is not prescribed as a convenience.       Demetri Sinclair MD  Associate Professor of Medicine  Med-Peds Hospitalist  Pager [phone]    Signed: 11/28/23

## 2023-11-28 NOTE — PLAN OF CARE
Goal Outcome Evaluation:      Plan of Care Reviewed With: patient    Overall Patient Progress: improving    Outcome Evaluation: Patient with inadequate yet improving intakes over last week. Continuing daily nutrition supplement, encouraged small frequent meals.

## 2023-11-28 NOTE — PROGRESS NOTES
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 On 11/17 a rapid response was called as patient was found to be obtunded and transferred to the ICU after intubation for airway protection. Acute mental status change and respiratory failure thought likely multifactorial including narcotic pain medication, acute infection (UTI), and acute on chronic hypercapneic respiratory failure. Extubated to room air 11/21, and transferred to the general medical floor 11/22.     Today:  - Continues to improve. Medically stable for discharge once plan determined with family between LTC and home with medical equipment/home care. Appreciate SW/RNCC assistance.     Toxic metabolic encephalopathy, multifactorial including hypercapnea, infection, and medication toxicity, improving   Rapid response was called 11/17 as patient was found to be obtunded and non-arousable. Attempts to arouse patient including Narcan were unsuccessful and was later transferred to the ICU where she was intubated for airway protection . With concerns for protecting her airway, patient was intubated. Head CT and Brain MRI on 11/19 without acute pathology. Neurology consulted.  EEG 11/20: When sedation was in use, moderate to severe encephalopathy noted. When sedation was not in use, moderate diffuse encephalopathy was noted.They did not appreciate any seizures on EEG.     Mental status improving. At this point clinical picture most consistent with resolving toxic metabolic encephalopathy related to  hospitalization, chronic hypercapnea, opioid toxicity, and infection from UTI.  - Delirium precautions   - Hold pta Nuvigil for sleep, to reduce AMS for now  - Slow re-introduction of home medications   - Careful use of opiate and other sedating medications     Progressive Multiple Sclerosis (wheelchair bound)   Patient has longstanding diagnosis of progressive MS now wheelchair bound. Follows with Immunology and Neurology outpatient. Last brain MRI in 2017 showing suspicion of demyelinating disease. Repeat brain MRI on 11/19/23 showed scattered T2 hyperintense foci throughout the subcorical and periventricular white matter grossly unchanged when compared to MRI on 11/1/2017. Unsure of exact biologics patient has was on, appears she has received Lemtrada (2017, 2019), and other past DMTs included Betaseron, Aubagio.   - Restarted PTA dose of Baclofen 20 mg QAM and 30 mg at bedtime  - Restarted PTA gabapentin at lower dose, 300 mg BID. At home takes 600 mg QID.   - Restarted pta oxybutynin for neurogenic bladder    Two outpatient mechanical falls   Right distal femur fracture and left malleolus fracture  Osteoporosis   - Orthopedics following  - NWB on bilateral lower extremities  - R leg long cast, L leg PRAFO   - Monitor pulses  - Oxycodone 2.5 mg Q6 hours, monitor for over sedation  - PT consulted  - Follow up with Dr. Em week of 12/11/23     Klebsiella UTI  Recurrent UTI  UA consistent with UTI. UC with Klebsiella pneumoniae. Suspect AMS related to recurrent UTI  - Stopped PTA macrobid  - S/p ceftriaxone 1g q24h X 7 days     Major Depressive Disorder   - Continue Cymbalta     Sleep apnea 2/2 MS   Appears patient follows with sleep clinic/doctor and found to have sleep apnea 2/2 her MS   - Wears a CPAP at home   - Wear CPAP at HS now that she is extubated     Tobacco use; current smoker      Hypercapnia - Resolved  History of pulmonary nodules   Recent CT Chest from 6/16/23 shows right lower lobe nodule  has resolved with a stable left lower lobe nodule.   - Now on yearly chest CT follow-ups      Hx of hypertension  Required levophed in ICU for hypotension while intubated/sedated.   - Restarted PTA hydrochlorothiazide     GERD  risk for malnutrition  - pantoprazole 40 mg suspension daily  - RD consulted, following   - Speech consulted, following  - Cleared for regular diet with thin liquids      DENNISE (resolved)  CKD   Recurrent UTI   Hypophosphatemia  Cr peaked at 2.11 on 11/18. Baseline creatinine around 0.96-1.08. DENNISE with CKD concerns for any sedating related medications could have caused patient to become obtunded. Confirmed UTI as below; previous recurrent UTI with klebsiella pneuomiae as well.   - Reduce nephrotoxic agents as able   - Monitor I/Os   - UC growing Klebsiella pneumoniae 11/19  - s/p ceftriaxone 1g q24h x 7 days total  - Daily BMP  - Mg, K, Phos protocols     Hx Graves s/p thyroid ablation   - TSH 0.64   - Continue pta synthroid 100 mcg daily   - Hypoglycemia protocol     Herpes simplex virus; prophylaxis on Acyclovir   - Continue pta Acyclovir 400 mg (Changed to IV for now and can change back to tablet once swallow is evaluated and passed)       Anemia of chronic disease 2/2 CKD   hrombocytopenia   Hgb 6.2 11/19. Was given 1 unit PRBCs.   - hemoglobin stable  - Hold pta ferrous sulfate  - Daily CBC      Ductal carcinoma in situ of left breast   - Follows with outpatient radiation/oncology and medical oncology annually            Diet: Snacks/Supplements Adult: Ensure Enlive; With Meals  Room Service  Combination Diet Regular Diet; Thin Liquids (level 0)    DVT Prophylaxis: Heparin SQ  Verduzco Catheter: Not present  Lines: None     Cardiac Monitoring: None  Code Status: Full Code      Clinically Significant Risk Factors           # Hypercalcemia: corrected calcium is >10.1, will monitor as appropriate  # Hypomagnesemia: Lowest Mg = 1.3 mg/dL in last 2 days, will replace as needed   # Hypoalbuminemia:  Lowest albumin = 2.7 g/dL at 11/21/2023  4:59 AM, will monitor as appropriate                # Financial/Environmental Concerns: none         Disposition Plan             Randell Sinclair MD  Hospitalist Service, GOLD TEAM 21  [facility]  Securely message with [employer] (more info)  Text page via [facility] Paging/Directory   See signed in provider for up to date coverage information  ______________________________________________________________________    Interval History   Doing well this morning. Pain has been better controlled. Looking forward to going home. Denies any nausea, has good appetite.     Physical Exam   Vital Signs: Temp: 98.2  F (36.8  C) Temp src: Oral BP: 111/57 Pulse: 80   Resp: 16 SpO2: 98 % O2 Device: None (Room air)    Weight: 138 lbs .13 oz    General: Awake, alert. Pleasant and interactive.  Mouth: edentulous   CV: Normal rate and rhythm, normal S1 and S2. No murmurs, rubs, gallops.   Respiratory: Lungs clear to auscultation bilaterally. No wheezing, rhonchi, or rales.  Abdomen: Soft, non-distended. Non-tender to palpation. No rebound tenderness or guarding. +BS.   MSK: RLE casted. Left lower extremity in boot. Joint contractures.   Extremities: Trace edema of the feet bilaterally  Skin: Warm, dry. No rash on visible skin.   Neuro: Alert. Oriented x3     Medical Decision Making       35 MINUTES SPENT BY ME on the date of service doing chart review, history, exam, documentation & further activities per the note.      Data     I have personally reviewed the following data over the past 24 hrs:    5.8  \   9.6 (L)   / 270     134 (L) 97 (L) 13.9 /  100 (H)   4.4 28 0.85 \     ALT: 11 AST: 16 AP: 79 TBILI: 0.3   ALB: 3.1 (L) TOT PROTEIN: 6.1 (L) LIPASE: N/A       Imaging results reviewed over the past 24 hrs:   Recent Results (from the past 24 hour(s))   XR Knee Right 1/2 Views    Narrative    EXAM: XR KNEE RIGHT 1/2 VIEWS  LOCATION: [facility]  DATE: 11/27/2023    INDICATION: 1.5 weeks post casting distal femur fx  COMPARISON: 11/18/2023      Impression    IMPRESSION: Overlying immobilization material obscures osseous detail. Again seen is an impacted, comminuted fracture of the distal femoral metaphysis, fairly similar in alignment and appearance compared to the 11/18/2023 study. Otherwise, unchanged.   XR Ankle Left G/E 3 Views    Narrative    EXAM: XR ANKLE LEFT G/E 3 VIEWS  LOCATION: [facility]  DATE: 11/27/2023    INDICATION: medial mall fx, 1.5 weeks after injury  COMPARISON: None.      Impression    IMPRESSION: Postoperative changes of IM alexus fixation of the tibia with additional syndesmotic fixation. This is stable from the prior examination. There is slight irregularity of the lateral cortex of the lateral malleolus worrisome for a new fracture.   There is also a fracture of the medial malleolus but this was seen on the prior examination and does not appear significantly changed. Soft tissue swelling about the ankle. Diffuse demineralization.

## 2023-11-28 NOTE — PLAN OF CARE
Plan of Care Reviewed With: patient  Overall Patient Progress: no change  Outcome Evaluation: Alert with intermit confusion.  BIPAP O/N + cont pulse ox.  BLE pain managed with PRNs.  RLE hard cast, L ankle brace on.  NOOB, lift tx.  A2 with turns.  Incont B/B, purewick on.  Pending dispo.    For vital signs and complete assessments, please see documentation flowsheets.      no

## 2023-11-29 ENCOUNTER — APPOINTMENT (OUTPATIENT)
Dept: PHYSICAL THERAPY | Facility: CLINIC | Age: 68
DRG: 533 | End: 2023-11-29
Attending: PEDIATRICS
Payer: MEDICARE

## 2023-11-29 LAB
ALBUMIN SERPL BCG-MCNC: 3.2 G/DL (ref 3.5–5.2)
ALP SERPL-CCNC: 99 U/L (ref 40–150)
ALT SERPL W P-5'-P-CCNC: 12 U/L (ref 0–50)
ANION GAP SERPL CALCULATED.3IONS-SCNC: 7 MMOL/L (ref 7–15)
AST SERPL W P-5'-P-CCNC: 20 U/L (ref 0–45)
BILIRUB SERPL-MCNC: 0.3 MG/DL
BUN SERPL-MCNC: 13.3 MG/DL (ref 8–23)
CALCIUM SERPL-MCNC: 9.5 MG/DL (ref 8.8–10.2)
CHLORIDE SERPL-SCNC: 99 MMOL/L (ref 98–107)
CREAT SERPL-MCNC: 0.91 MG/DL (ref 0.51–0.95)
DEPRECATED HCO3 PLAS-SCNC: 29 MMOL/L (ref 22–29)
EGFRCR SERPLBLD CKD-EPI 2021: 68 ML/MIN/1.73M2
ERYTHROCYTE [DISTWIDTH] IN BLOOD BY AUTOMATED COUNT: 16.5 % (ref 10–15)
GLUCOSE SERPL-MCNC: 94 MG/DL (ref 70–99)
HCT VFR BLD AUTO: 30 % (ref 35–47)
HGB BLD-MCNC: 9.8 G/DL (ref 11.7–15.7)
MCH RBC QN AUTO: 30.1 PG (ref 26.5–33)
MCHC RBC AUTO-ENTMCNC: 32.7 G/DL (ref 31.5–36.5)
MCV RBC AUTO: 92 FL (ref 78–100)
PHOSPHATE SERPL-MCNC: 3.4 MG/DL (ref 2.5–4.5)
PLATELET # BLD AUTO: 253 10E3/UL (ref 150–450)
POTASSIUM SERPL-SCNC: 4.2 MMOL/L (ref 3.4–5.3)
PROT SERPL-MCNC: 6.3 G/DL (ref 6.4–8.3)
RBC # BLD AUTO: 3.26 10E6/UL (ref 3.8–5.2)
SODIUM SERPL-SCNC: 135 MMOL/L (ref 135–145)
WBC # BLD AUTO: 4.7 10E3/UL (ref 4–11)

## 2023-11-29 PROCEDURE — 250N000011 HC RX IP 250 OP 636: Performed by: NURSE PRACTITIONER

## 2023-11-29 PROCEDURE — 250N000013 HC RX MED GY IP 250 OP 250 PS 637: Performed by: INTERNAL MEDICINE

## 2023-11-29 PROCEDURE — 250N000013 HC RX MED GY IP 250 OP 250 PS 637

## 2023-11-29 PROCEDURE — 97530 THERAPEUTIC ACTIVITIES: CPT | Mod: GP

## 2023-11-29 PROCEDURE — 120N000002 HC R&B MED SURG/OB UMMC

## 2023-11-29 PROCEDURE — 99231 SBSQ HOSP IP/OBS SF/LOW 25: CPT | Performed by: INTERNAL MEDICINE

## 2023-11-29 PROCEDURE — 250N000013 HC RX MED GY IP 250 OP 250 PS 637: Performed by: STUDENT IN AN ORGANIZED HEALTH CARE EDUCATION/TRAINING PROGRAM

## 2023-11-29 PROCEDURE — 250N000013 HC RX MED GY IP 250 OP 250 PS 637: Performed by: PEDIATRICS

## 2023-11-29 PROCEDURE — 97110 THERAPEUTIC EXERCISES: CPT | Mod: GP

## 2023-11-29 PROCEDURE — 85027 COMPLETE CBC AUTOMATED: CPT

## 2023-11-29 PROCEDURE — 36415 COLL VENOUS BLD VENIPUNCTURE: CPT

## 2023-11-29 PROCEDURE — 80053 COMPREHEN METABOLIC PANEL: CPT

## 2023-11-29 PROCEDURE — 84100 ASSAY OF PHOSPHORUS: CPT | Performed by: INTERNAL MEDICINE

## 2023-11-29 RX ORDER — MAGNESIUM OXIDE 400 MG/1
400 TABLET ORAL EVERY 4 HOURS
Status: COMPLETED | OUTPATIENT
Start: 2023-11-29 | End: 2023-11-29

## 2023-11-29 RX ADMIN — LATANOPROST 1 DROP: 50 SOLUTION OPHTHALMIC at 09:20

## 2023-11-29 RX ADMIN — MAGNESIUM OXIDE TAB 400 MG (241.3 MG ELEMENTAL MG) 400 MG: 400 (241.3 MG) TAB at 17:31

## 2023-11-29 RX ADMIN — BACLOFEN 30 MG: 20 TABLET ORAL at 22:05

## 2023-11-29 RX ADMIN — HYDROCHLOROTHIAZIDE 12.5 MG: 12.5 TABLET ORAL at 09:10

## 2023-11-29 RX ADMIN — HEPARIN SODIUM 5000 UNITS: 5000 INJECTION, SOLUTION INTRAVENOUS; SUBCUTANEOUS at 14:04

## 2023-11-29 RX ADMIN — Medication 600 MG: at 09:10

## 2023-11-29 RX ADMIN — OXYBUTYNIN CHLORIDE 10 MG: 10 TABLET, EXTENDED RELEASE ORAL at 09:11

## 2023-11-29 RX ADMIN — HEPARIN SODIUM 5000 UNITS: 5000 INJECTION, SOLUTION INTRAVENOUS; SUBCUTANEOUS at 07:31

## 2023-11-29 RX ADMIN — DULOXETINE 80 MG: 60 CAPSULE, DELAYED RELEASE ORAL at 09:10

## 2023-11-29 RX ADMIN — GABAPENTIN 300 MG: 300 CAPSULE ORAL at 09:10

## 2023-11-29 RX ADMIN — Medication 2.5 MG: at 01:18

## 2023-11-29 RX ADMIN — ACETAMINOPHEN 1000 MG: 325 TABLET, FILM COATED ORAL at 09:10

## 2023-11-29 RX ADMIN — GABAPENTIN 300 MG: 300 CAPSULE ORAL at 20:17

## 2023-11-29 RX ADMIN — FERROUS SULFATE TAB 325 MG (65 MG ELEMENTAL FE) 325 MG: 325 (65 FE) TAB at 09:10

## 2023-11-29 RX ADMIN — FAMOTIDINE 40 MG: 20 TABLET ORAL at 22:05

## 2023-11-29 RX ADMIN — Medication 2.5 MG: at 07:39

## 2023-11-29 RX ADMIN — MULTIPLE VITAMINS W/ MINERALS TAB 1 TABLET: TAB at 20:17

## 2023-11-29 RX ADMIN — Medication 1 CAPSULE: at 09:11

## 2023-11-29 RX ADMIN — HEPARIN SODIUM 5000 UNITS: 5000 INJECTION, SOLUTION INTRAVENOUS; SUBCUTANEOUS at 22:06

## 2023-11-29 RX ADMIN — Medication 2.5 MG: at 17:35

## 2023-11-29 RX ADMIN — CHOLESTYRAMINE 4 G: 4 POWDER, FOR SUSPENSION ORAL at 22:27

## 2023-11-29 RX ADMIN — ACETAMINOPHEN 1000 MG: 325 TABLET, FILM COATED ORAL at 20:17

## 2023-11-29 RX ADMIN — ACYCLOVIR 400 MG: 400 TABLET ORAL at 20:17

## 2023-11-29 RX ADMIN — Medication 1 CAPSULE: at 20:17

## 2023-11-29 RX ADMIN — ACYCLOVIR 400 MG: 400 TABLET ORAL at 09:10

## 2023-11-29 RX ADMIN — MAGNESIUM OXIDE TAB 400 MG (241.3 MG ELEMENTAL MG) 400 MG: 400 (241.3 MG) TAB at 20:46

## 2023-11-29 RX ADMIN — LEVOTHYROXINE SODIUM 100 MCG: 100 TABLET ORAL at 07:38

## 2023-11-29 RX ADMIN — BACLOFEN 20 MG: 20 TABLET ORAL at 09:10

## 2023-11-29 ASSESSMENT — ACTIVITIES OF DAILY LIVING (ADL)
ADLS_ACUITY_SCORE: 53
ADLS_ACUITY_SCORE: 49
ADLS_ACUITY_SCORE: 49
ADLS_ACUITY_SCORE: 53

## 2023-11-29 NOTE — PLAN OF CARE
Physical Therapy Discharge Summary    Reason for therapy discharge:    No further expectations of functional progress.    Progress towards therapy goal(s). See goals on Care Plan in [facility] electronic health record for goal details.  Goals met    Therapy recommendation(s):    Continued therapy is recommended.  Rationale/Recommendations:  rec continued therapy via home PT to help further train in home set up and home equipment that is issued. Pt has HEP she can continue w/ in the meantime.

## 2023-11-29 NOTE — PROGRESS NOTES
"Care Management Follow Up    Length of Stay (days): 12    Expected Discharge Date: 11/30/2023     Concerns to be Addressed: discharge planning     Patient plan of care discussed at interdisciplinary rounds: Yes    Anticipated Discharge Disposition: Discharge planning       Anticipated Discharge Services:  [facility] RN, PT, OT, and HHA   Anticipated Discharge DME: Commode, Bed, Other (see comment) (lift)    Private pay costs discussed: Not applicable    Additional Information:  RNCC followed up with DME orders. [facility] declined DME orders and requested additional documentation.    RNCC worked with Dr. Sinclair to update DME orders and progress note and faxed back to [facility]. RNCC also emailed , Maru at 10:54am to confirm fax was submitted at her request so she could pull order from the que.     RNCC called [facility] at 1330 to follow up with updated DME orders and left a VM.    RNCC called [facility] at 1630 to follow up with updated DME orders. Intake declined hospital bed stating DME documentation needs to be a \"narrative\". RNCC requested example of verbiage required by Medicare to approve DME order. Maru stated she would email this to RNCC. Awaiting email.    0646 Update:  Will need the following documentation added to the DME order and progress note for tomorrow. DME order updated with necessary verbiage. Will need new progress note and refax DME order tomorrow.     \"The patient requires positioning of the body in ways not feasible with an ordinary bed due to MS (multiple sclerosis), graves disease with osteoporosis that is expected to last a lifetime, and for alleviation of pain from right distal femur fracture and left malleolus fracture.  The patient requires frequent changes in body position due to multiple fractures, and MS.  Pt requires 2 person assist and is no weight bearing\"      Maru Marquez  DME INTAKE SERVICES  E: [email or web address]  P: " [phone] Extension [phone]  F: [phone]    Care management will continue to follow and support safe discharge planning as needed.    Jennifer Porter RN  [facility]   [phone]

## 2023-11-29 NOTE — PLAN OF CARE
VS: VSS, pt denied CP or SOB.   O2: Room air sat. > 90%.   Output: Voiding adequate amount, with external cathter in place.    Last BM: 11/28/23    Activity: Up to chair using lift and repositioned in bed.    Skin: Intact except some redness on buttocks/IT and left heel.   Pain: Comfortably manageable with PRN medication.    Neuro: CMS and neuro intact to baseline.    Dressing: CDI.    Diet: Regular tolerating okay.    LDA: FATOUMATA CORDON.    Equipment: IV pole and personal belongings.    Plan: TBD.    Additional Info:

## 2023-11-29 NOTE — PLAN OF CARE
VS: VSS, pt denied CP or SOB.   O2: Room air sat. > 90%.   Output: Voiding adequate amount, with external cathter in place.    Last BM: 11/27/23    Activity: Up to chair using lift and repositioned in bed.    Skin: Intact except some redness on buttocks/IT and left   Pain: Comfortably manageable with PRN medication.    Neuro: CMS and neuro intact to baseline.    Dressing: CDI.    Diet: Regular tolerating okay.    LDA: FATOUMATA CORDON.    Equipment: IV pole and personal belongings.    Plan: TBD.    Additional Info:

## 2023-11-29 NOTE — PROGRESS NOTES
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 On 11/17 a rapid response was called as patient was found to be obtunded and transferred to the ICU after intubation for airway protection. Acute mental status change and respiratory failure thought likely multifactorial including narcotic pain medication, acute infection (UTI), and acute on chronic hypercapneic respiratory failure. Extubated to room air 11/21, and transferred to the general medical floor 11/22.     Today:  - Remains medically stable for discharge once medical equipment/home care in place. Appreciate SW/RNCC assistance.     Toxic metabolic encephalopathy, multifactorial including hypercapnea, infection, and medication toxicity, improving   Rapid response was called 11/17 as patient was found to be obtunded and non-arousable. Attempts to arouse patient including Narcan were unsuccessful and was later transferred to the ICU where she was intubated for airway protection . With concerns for protecting her airway, patient was intubated. Head CT and Brain MRI on 11/19 without acute pathology. Neurology consulted.  EEG 11/20: When sedation was in use, moderate to severe encephalopathy noted. When sedation was not in use, moderate diffuse encephalopathy was noted.They did not appreciate any seizures on EEG.     Mental status improving. At this point clinical picture most consistent with resolving toxic metabolic encephalopathy related to hospitalization, chronic hypercapnea, opioid toxicity, and  infection from UTI.  - Delirium precautions   - Hold pta Nuvigil for sleep, to reduce AMS  - Slow re-introduction of home medications   - Careful use of opiate and other sedating medications     Progressive Multiple Sclerosis (wheelchair bound)   Patient has longstanding diagnosis of progressive MS now wheelchair bound. Follows with Immunology and Neurology outpatient. Last brain MRI in 2017 showing suspicion of demyelinating disease. Repeat brain MRI on 11/19/23 showed scattered T2 hyperintense foci throughout the subcorical and periventricular white matter grossly unchanged when compared to MRI on 11/1/2017. Unsure of exact biologics patient has was on, appears she has received Lemtrada (2017, 2019), and other past DMTs included Betaseron, Aubagio.   - Restarted PTA dose of Baclofen 20 mg QAM and 30 mg at bedtime  - Restarted PTA gabapentin at lower dose, 300 mg BID. At home takes 600 mg QID.   - Restarted pta oxybutynin for neurogenic bladder    Two outpatient mechanical falls   Right distal femur fracture and left malleolus fracture  Osteoporosis   - Orthopedics following  - NWB on bilateral lower extremities  - R leg long cast, L leg PRAFO   - Monitor pulses  - Oxycodone 2.5 mg Q6 hours, monitor for over sedation  - PT consulted  - Follow up with Dr. Em week of 12/11/23     Klebsiella UTI  Recurrent UTI  UA consistent with UTI. UC with Klebsiella pneumoniae. Suspect AMS related to recurrent UTI  - Stopped PTA macrobid  - S/p ceftriaxone 1g q24h X 7 days     Major Depressive Disorder   - Continue Cymbalta     Sleep apnea 2/2 MS   Appears patient follows with sleep clinic/doctor and found to have sleep apnea 2/2 her MS   - Wears a CPAP at home   - Wear CPAP at  now that she is extubated     Tobacco use; current smoker      Hypercapnia - Resolved  History of pulmonary nodules   Recent CT Chest from 6/16/23 shows right lower lobe nodule has resolved with a stable left lower lobe nodule.   - Now on yearly  chest CT follow-ups      Hx of hypertension  Required levophed in ICU for hypotension while intubated/sedated.   - Restarted PTA hydrochlorothiazide     GERD  risk for malnutrition  - pantoprazole 40 mg suspension daily  - RD consulted, following   - Speech consulted, following  - Cleared for regular diet with thin liquids      DENNISE (resolved)  CKD   Recurrent UTI   Hypophosphatemia  Cr peaked at 2.11 on 11/18. Baseline creatinine around 0.96-1.08. DENNISE with CKD concerns for any sedating related medications could have caused patient to become obtunded. Confirmed UTI as below; previous recurrent UTI with klebsiella pneuomiae as well.   - Reduce nephrotoxic agents as able   - Monitor I/Os   - UC growing Klebsiella pneumoniae 11/19  - s/p ceftriaxone 1g q24h x 7 days total  - Daily BMP  - Mg, K, Phos protocols     Hx Graves s/p thyroid ablation   - TSH 0.64   - Continue pta synthroid 100 mcg daily   - Hypoglycemia protocol     Herpes simplex virus; prophylaxis on Acyclovir   - Continue pta Acyclovir 400 mg (Changed to IV for now and can change back to tablet once swallow is evaluated and passed)     Anemia of chronic disease 2/2 CKD   Thrombocytopenia   Hgb 6.2 11/19. Was given 1 unit PRBCs.   - hemoglobin stable  - Hold pta ferrous sulfate  - Daily CBC      Ductal carcinoma in situ of left breast   - Follows with outpatient radiation/oncology and medical oncology annually            Diet: Room Service  Combination Diet Regular Diet; Thin Liquids (level 0)  Snacks/Supplements Adult: Ensure Enlive; With Meals    DVT Prophylaxis: Heparin SQ  Verduzco Catheter: Not present  Lines: None     Cardiac Monitoring: None  Code Status: Full Code      Clinically Significant Risk Factors           # Hypercalcemia: corrected calcium is >10.1, will monitor as appropriate    # Hypoalbuminemia: Lowest albumin = 2.7 g/dL at 11/21/2023  4:59 AM, will monitor as appropriate             # Moderate Malnutrition: based on nutrition assessment     # Financial/Environmental Concerns: none         Disposition Plan               Documentation of Face to Face and Certification for DME        Miscellaneous DME Order      Order Details Per Staff Recommendation - Signature Required - Ordering Jennifer Porter RN at 11/29/2023 10:17 AM   Diagnoses: MS (multiple sclerosis) (H); Other closed fracture of distal end of right femur, initial encounter (H)   Reason For Exam  Priority: Routine  Dx: MS (multiple sclerosis) (H) [G35 (ICD-10-CM)]; Other closed fracture of distal end of right femur, initial encounter (H) [S72.491A (ICD-10-CM)]   Comments: DME Documentation:  Describe the reason for need to support medical necessity: Multiple Sclerosis, Bilateral Broken Lower Extremities requiring a karyn lift for safe positioning and transferring from bed to chair.    PATIENT LIFT QUESTION:    Does the patient need Karyn to transfer between bed, chair, wheelchair, and commode or requires assistance of more than one person: Yes  Without the use of this lift patient would be bed confined?  Yes    I, the undersigned, certify that the above prescribed supplies are medically necessary for this patient and is both reasonable and necessary in reference to accepted standards of medical and necessary in reference to accepted standards of medical practice in the treatment of this patient's condition and is not prescribed as a convenience.     Commode      Order Details Per Staff Recommendation - Signature Required - Ordering Jennifer Porter RN at 11/29/2023 10:17 AM   Diagnoses: MS (multiple sclerosis) (H); Other closed fracture of distal end of right femur, initial encounter (H)   Reason For Exam  Priority: Routine  Dx: MS (multiple sclerosis) (H) [G35 (ICD-10-CM)]; Other closed fracture of distal end of right femur, initial encounter (H) [S72.491A (ICD-10-CM)]   Comments: DME Documentation:  Describe the reason for need to support medical necessity: multiple sclerosis,  right distal  femur fracture and left malleolus fracture confining her to a single room for safety.    I, the undersigned, certify that the above prescribed supplies are medically necessary for this patient and is both reasonable and necessary in reference to accepted standards of medical and necessary in reference to accepted standards of medical practice in the treatment of this patient's condition and is not prescribed as a convenience.   Hospital Bed      Order Details Per Staff Recommendation - Signature Required - Ordering Jennifer Porter RN at 11/29/2023  5:10 PM   Diagnoses: MS (multiple sclerosis) (H); Other closed fracture of distal end of right femur, initial encounter (H)   Reason For Exam  Priority: Routine  Dx: MS (multiple sclerosis) (H) [G35 (ICD-10-CM)]; Other closed fracture of distal end of right femur, initial encounter (H) [S72.491A (ICD-10-CM)]   Comments: Hospital Bed Documentation:  A hospital bed is required for body positioning, to allow for safe transfers to wheelchair and standing and frequent changes in body position, not feasible in an ordinary bed. The patient requires positioning of the body in ways not feasible with an ordinary bed due to MS (multiple sclerosis), graves disease with osteoporosis that is expected to last a lifetime, and for alleviation of pain from right distal femur fracture and left malleolus fracture that is expected to last greater than 6 months.  The patient requires frequent changes in body position due to multiple fractures, and MS.  Pt requires 2 person assist and is non weight bearing. These medical conditions are expected to last 6 months or longer. Because she is non weight bearing on her bilateral lower extremities she will require frequent positioning for pain control and healing. Pt will require a semi electric bed for safe positioning and transferring to accommodate this medical need.       I, the undersigned, certify that the above prescribed supplies are medically  necessary for this patient and is both reasonable and necessary in reference to accepted standards of medical and necessary in reference to accepted standards of medical practice in the treatment of this patient's condition and is not prescribed as a convenience.              Randell Sinclair MD  Hospitalist Service, GOLD TEAM 21  [facility]  Securely message with [employer] (more info)  Text page via [facility] Paging/Directory   See signed in provider for up to date coverage information  ______________________________________________________________________    Interval History   Doing well this morning. Pain has been better controlled. Looking forward to going home. Denies any nausea, has good appetite. Hopes to get to     Physical Exam   Vital Signs: Temp: 98.1  F (36.7  C) Temp src: Axillary BP: 108/51 Pulse: 85   Resp: 16 SpO2: 98 % O2 Device: None (Room air)    Weight: 138 lbs .13 oz    General: Awake, alert. Pleasant and interactive.  Mouth: edentulous   CV: Normal rate and rhythm, normal S1 and S2. No murmurs, rubs, gallops.   Respiratory: Breathing comfortably.   Abdomen: Soft, non-distended. Non-tender to palpation. No rebound tenderness or guarding.   MSK: RLE casted. Left lower extremity in boot. Joint contractures.   Extremities: Trace edema of the feet bilaterally  Skin: Warm, dry. No rash on visible skin.   Neuro: Alert. Oriented x3     Medical Decision Making       30 MINUTES SPENT BY ME on the date of service doing chart review, history, exam, documentation & further activities per the note.      Data     I have personally reviewed the following data over the past 24 hrs:    4.7  \   9.8 (L)   / 253     135 99 13.3 /  94   4.2 29 0.91 \     ALT: 12 AST: 20 AP: 99 TBILI: 0.3   ALB: 3.2 (L) TOT PROTEIN: 6.3 (L) LIPASE: N/A       Imaging results reviewed over the past 24 hrs:   No results found for this or any previous visit (from the past 24 hour(s)).

## 2023-11-29 NOTE — PLAN OF CARE
Goal Outcome Evaluation:      Plan of Care Reviewed With: patient    Overall Patient Progress: improvingOverall Patient Progress: improving    Outcome Evaluation: Alert with occasional disorientation to time, easily redirectable.  BIPAP O/N + cont pulse ox.  BLE pain managed with PRNs.  RLE hard cast, L ankle brace on.  NOOB, lift tx.  A2 with turns.  Incont B/B, purewick on.  Plan is to discharge home, but equipment needs to be set up at home.

## 2023-11-30 VITALS
SYSTOLIC BLOOD PRESSURE: 112 MMHG | DIASTOLIC BLOOD PRESSURE: 53 MMHG | BODY MASS INDEX: 20.92 KG/M2 | TEMPERATURE: 98.1 F | RESPIRATION RATE: 16 BRPM | OXYGEN SATURATION: 97 % | WEIGHT: 138.01 LBS | HEART RATE: 78 BPM | HEIGHT: 68 IN

## 2023-11-30 LAB
ALBUMIN SERPL BCG-MCNC: 3.2 G/DL (ref 3.5–5.2)
ALP SERPL-CCNC: 118 U/L (ref 40–150)
ALT SERPL W P-5'-P-CCNC: 10 U/L (ref 0–50)
ANION GAP SERPL CALCULATED.3IONS-SCNC: 8 MMOL/L (ref 7–15)
AST SERPL W P-5'-P-CCNC: 16 U/L (ref 0–45)
BILIRUB SERPL-MCNC: 0.3 MG/DL
BUN SERPL-MCNC: 16.3 MG/DL (ref 8–23)
CALCIUM SERPL-MCNC: 9.5 MG/DL (ref 8.8–10.2)
CHLORIDE SERPL-SCNC: 98 MMOL/L (ref 98–107)
CREAT SERPL-MCNC: 0.96 MG/DL (ref 0.51–0.95)
DEPRECATED HCO3 PLAS-SCNC: 30 MMOL/L (ref 22–29)
EGFRCR SERPLBLD CKD-EPI 2021: 64 ML/MIN/1.73M2
ERYTHROCYTE [DISTWIDTH] IN BLOOD BY AUTOMATED COUNT: 16.8 % (ref 10–15)
GLUCOSE SERPL-MCNC: 96 MG/DL (ref 70–99)
HCT VFR BLD AUTO: 31.6 % (ref 35–47)
HGB BLD-MCNC: 10.3 G/DL (ref 11.7–15.7)
MAGNESIUM SERPL-MCNC: 1.7 MG/DL (ref 1.7–2.3)
MCH RBC QN AUTO: 30.2 PG (ref 26.5–33)
MCHC RBC AUTO-ENTMCNC: 32.6 G/DL (ref 31.5–36.5)
MCV RBC AUTO: 93 FL (ref 78–100)
PHOSPHATE SERPL-MCNC: 3.3 MG/DL (ref 2.5–4.5)
PLATELET # BLD AUTO: 299 10E3/UL (ref 150–450)
POTASSIUM SERPL-SCNC: 4.3 MMOL/L (ref 3.4–5.3)
PROT SERPL-MCNC: 6.6 G/DL (ref 6.4–8.3)
RBC # BLD AUTO: 3.41 10E6/UL (ref 3.8–5.2)
SODIUM SERPL-SCNC: 136 MMOL/L (ref 135–145)
WBC # BLD AUTO: 4.8 10E3/UL (ref 4–11)

## 2023-11-30 PROCEDURE — 999N000111 HC STATISTIC OT IP EVAL DEFER

## 2023-11-30 PROCEDURE — 80053 COMPREHEN METABOLIC PANEL: CPT

## 2023-11-30 PROCEDURE — 99239 HOSP IP/OBS DSCHRG MGMT >30: CPT | Performed by: INTERNAL MEDICINE

## 2023-11-30 PROCEDURE — 250N000013 HC RX MED GY IP 250 OP 250 PS 637

## 2023-11-30 PROCEDURE — 250N000013 HC RX MED GY IP 250 OP 250 PS 637: Performed by: STUDENT IN AN ORGANIZED HEALTH CARE EDUCATION/TRAINING PROGRAM

## 2023-11-30 PROCEDURE — 250N000013 HC RX MED GY IP 250 OP 250 PS 637: Performed by: INTERNAL MEDICINE

## 2023-11-30 PROCEDURE — 83735 ASSAY OF MAGNESIUM: CPT | Performed by: INTERNAL MEDICINE

## 2023-11-30 PROCEDURE — 85027 COMPLETE CBC AUTOMATED: CPT

## 2023-11-30 PROCEDURE — 250N000011 HC RX IP 250 OP 636: Performed by: NURSE PRACTITIONER

## 2023-11-30 PROCEDURE — 84100 ASSAY OF PHOSPHORUS: CPT | Performed by: INTERNAL MEDICINE

## 2023-11-30 PROCEDURE — 36415 COLL VENOUS BLD VENIPUNCTURE: CPT

## 2023-11-30 RX ORDER — CHOLESTYRAMINE 4 G/9G
1 POWDER, FOR SUSPENSION ORAL 2 TIMES DAILY
Qty: 60 PACKET | Refills: 1 | Status: ON HOLD | OUTPATIENT
Start: 2023-11-30 | End: 2024-01-06

## 2023-11-30 RX ORDER — CHOLESTYRAMINE 4 G/9G
1 POWDER, FOR SUSPENSION ORAL 2 TIMES DAILY
Qty: 60 PACKET | Refills: 1 | Status: SHIPPED | OUTPATIENT
Start: 2023-11-30 | End: 2023-11-30

## 2023-11-30 RX ORDER — POLYETHYLENE GLYCOL 3350 17 G/17G
17 POWDER, FOR SOLUTION ORAL DAILY
Qty: 510 G | Refills: 1 | Status: ON HOLD | OUTPATIENT
Start: 2023-11-30 | End: 2024-04-09

## 2023-11-30 RX ORDER — MAGNESIUM OXIDE 400 MG/1
400 TABLET ORAL EVERY 4 HOURS
Status: COMPLETED | OUTPATIENT
Start: 2023-11-30 | End: 2023-11-30

## 2023-11-30 RX ORDER — GABAPENTIN 300 MG/1
300 CAPSULE ORAL 2 TIMES DAILY
Qty: 60 CAPSULE | Refills: 1 | Status: ON HOLD | OUTPATIENT
Start: 2023-11-30 | End: 2024-01-18

## 2023-11-30 RX ORDER — OXYCODONE HYDROCHLORIDE 5 MG/1
2.5 TABLET ORAL EVERY 6 HOURS PRN
Qty: 15 TABLET | Refills: 0 | Status: SHIPPED | OUTPATIENT
Start: 2023-11-30 | End: 2023-12-08

## 2023-11-30 RX ADMIN — Medication 600 MG: at 08:34

## 2023-11-30 RX ADMIN — OXYBUTYNIN CHLORIDE 10 MG: 10 TABLET, EXTENDED RELEASE ORAL at 08:35

## 2023-11-30 RX ADMIN — HYDROCHLOROTHIAZIDE 12.5 MG: 12.5 TABLET ORAL at 08:34

## 2023-11-30 RX ADMIN — MAGNESIUM OXIDE TAB 400 MG (241.3 MG ELEMENTAL MG) 400 MG: 400 (241.3 MG) TAB at 12:50

## 2023-11-30 RX ADMIN — MAGNESIUM OXIDE TAB 400 MG (241.3 MG ELEMENTAL MG) 400 MG: 400 (241.3 MG) TAB at 08:36

## 2023-11-30 RX ADMIN — GABAPENTIN 300 MG: 300 CAPSULE ORAL at 08:35

## 2023-11-30 RX ADMIN — HEPARIN SODIUM 5000 UNITS: 5000 INJECTION, SOLUTION INTRAVENOUS; SUBCUTANEOUS at 15:24

## 2023-11-30 RX ADMIN — ACYCLOVIR 400 MG: 400 TABLET ORAL at 08:36

## 2023-11-30 RX ADMIN — LATANOPROST 1 DROP: 50 SOLUTION OPHTHALMIC at 08:43

## 2023-11-30 RX ADMIN — HEPARIN SODIUM 5000 UNITS: 5000 INJECTION, SOLUTION INTRAVENOUS; SUBCUTANEOUS at 06:55

## 2023-11-30 RX ADMIN — BACLOFEN 20 MG: 20 TABLET ORAL at 08:35

## 2023-11-30 RX ADMIN — FERROUS SULFATE TAB 325 MG (65 MG ELEMENTAL FE) 325 MG: 325 (65 FE) TAB at 08:36

## 2023-11-30 RX ADMIN — Medication 1 CAPSULE: at 08:33

## 2023-11-30 RX ADMIN — LEVOTHYROXINE SODIUM 100 MCG: 100 TABLET ORAL at 06:54

## 2023-11-30 RX ADMIN — Medication 2.5 MG: at 08:28

## 2023-11-30 RX ADMIN — Medication 2.5 MG: at 15:49

## 2023-11-30 RX ADMIN — DULOXETINE 80 MG: 60 CAPSULE, DELAYED RELEASE ORAL at 08:34

## 2023-11-30 ASSESSMENT — ACTIVITIES OF DAILY LIVING (ADL)
ADLS_ACUITY_SCORE: 49

## 2023-11-30 NOTE — PLAN OF CARE
Goal Outcome Evaluation:                 Outcome Evaluation: A/O2-3 TIMES PLEASANT. PT IS TOTAL CARE DOES NOT WALK, USES Cyling lift. Pt has hard cast on the right leg from thigh down. Left foot has a brace. Using purewick had 500 ml out. Uses BIPA/CPAP at night. multiples bruising on various parts, arms, leg

## 2023-11-30 NOTE — PLAN OF CARE
Occupational Therapy: Orders received. Chart reviewed and discussed with care team.? Occupational Therapy not indicated due to per discussion with PT, pt dependent with all cares and transfers at this time and family training completed with spouse on functional transfers with PT. Plan in place for pt to discharge home with lift device, hospital bed, and commode. No IP OT needs.? Defer discharge recommendations to PT.? Will complete orders.

## 2023-11-30 NOTE — DISCHARGE SUMMARY
[facility]  Hospitalist Discharge Summary      Date of Admission:  11/16/2023  Date of Discharge:  11/30/2023  Discharging Provider: Randell Sinclair MD  Discharge Service: Hospitalist Service, GOLD TEAM 21    Discharge Diagnoses   See hospital course below    Clinically Significant Risk Factors     # Moderate Malnutrition: based on nutrition assessment      Follow-ups Needed After Discharge   Follow-up Appointments     Adult [facility] Follow-up and recommended labs and tests      Follow up with Dr. Em week of 12/11/23   Follow up with primary care provider, Carolina Pulliam, as needed    Appointments on [place] and/or [facility] (with [facility] or [facility]   provider or service). Call [phone] if you haven't heard regarding   these appointments within 7 days of discharge.            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 On 11/17 a rapid response was called as patient was found to be obtunded and transferred to the ICU after intubation for airway protection. Acute mental status change and respiratory failure thought likely multifactorial including narcotic pain medication, acute infection (UTI), and acute on chronic hypercapneic respiratory failure. Extubated to room air 11/21, and transferred to the general medical floor 11/22.     Toxic metabolic encephalopathy, multifactorial including hypercapnea, infection, and medication toxicity, improving   Rapid response was called 11/17 as patient was found to be obtunded and non-arousable. Attempts to arouse patient  including Narcan were unsuccessful and was later transferred to the ICU where she was intubated for airway protection . With concerns for protecting her airway, patient was intubated. Head CT and Brain MRI on 11/19 without acute pathology. Neurology consulted.  EEG 11/20: When sedation was in use, moderate to severe encephalopathy noted. When sedation was not in use, moderate diffuse encephalopathy was noted.They did not appreciate any seizures on EEG.     Mental status improved and returned to baseline. Clinical picture most consistent with resolving toxic metabolic encephalopathy related to hospitalization, chronic hypercapnea, opioid toxicity, and infection from UTI.  - Would continue to hold pta Nuvigil  - Careful use of opiate and other sedating medications     Progressive Multiple Sclerosis (wheelchair bound)   Patient has longstanding diagnosis of progressive MS now wheelchair bound. Follows with Immunology and Neurology outpatient. Last brain MRI in 2017 showing suspicion of demyelinating disease. Repeat brain MRI on 11/19/23 showed scattered T2 hyperintense foci throughout the subcorical and periventricular white matter grossly unchanged when compared to MRI on 11/1/2017. Unsure of exact biologics patient has was on, appears she has received Lemtrada (2017, 2019), and other past DMTs included Betaseron, Aubagio.   - Restarted PTA dose of Baclofen 20 mg QAM and 30 mg at bedtime  - Restarted PTA gabapentin at lower dose, 300 mg BID with good response. (Previously on 600 mg QID)  - Restarted pta oxybutynin for neurogenic bladder    Two outpatient mechanical falls   Right distal femur fracture and left malleolus fracture  Osteoporosis   - Orthopedics following and managing conservatively.   - NWB on bilateral lower extremities  - R leg long cast, L leg PRAFO   - Oxycodone 2.5 mg Q6 hours with good pain control  - PT consulted  - Follow up with Dr. Em week of 12/11/23     Klebsiella UTI  Recurrent  UTI  UA was consistent with UTI. UC with Klebsiella pneumoniae. Suspect AMS related to recurrent UTI  - S/p ceftriaxone 1g q24h X 7 days (completed 11/24)     Major Depressive Disorder   - Continue Cymbalta     Sleep apnea 2/2 MS   Appears patient follows with sleep clinic/doctor and found to have sleep apnea 2/2 her MS   - continue cpap per home routine     Tobacco use; current smoker      Hypercapnia - Resolved  History of pulmonary nodules   Recent CT Chest from 6/16/23 shows right lower lobe nodule has resolved with a stable left lower lobe nodule.   - Now on yearly chest CT follow-ups      Hx of hypertension  Required levophed in ICU for hypotension while intubated/sedated.   - Restarted PTA hydrochlorothiazide     GERD  risk for malnutrition  - pantoprazole 40 mg suspension daily  - nutrition followed     DENNISE (resolved)  CKD   Recurrent UTI   Hypophosphatemia  Cr peaked at 2.11 on 11/18. Baseline creatinine around 0.96-1.08. DENNISE with CKD concerns for any sedating related medications could have caused patient to become obtunded. Confirmed UTI as below; previous recurrent UTI with klebsiella pneuomiae as well. DENNISE resolved.      Hx Graves s/p thyroid ablation   - TSH 0.64   - Continue pta synthroid 100 mcg daily   - Hypoglycemia protocol     Herpes simplex virus; prophylaxis on Acyclovir   - Continue pta Acyclovir 400 mg (Changed to IV for now and can change back to tablet once swallow is evaluated and passed)     Anemia of chronic disease 2/2 CKD   Thrombocytopenia   Hgb 6.2 11/19. Was given 1 unit PRBCs.   - hemoglobin stable     Ductal carcinoma in situ of left breast   - Follows with outpatient radiation/oncology and medical oncology annually        Consultations This Hospital Stay   PHYSICAL THERAPY ADULT IP CONSULT  OCCUPATIONAL THERAPY ADULT IP CONSULT  CARE MANAGEMENT / SOCIAL WORK IP CONSULT  ORTHOPAEDIC SURGERY ADULT/PEDS IP CONSULT  ANESTHESIOLOGY IP CONSULT  NURSING TO CONSULT FOR VASCULAR ACCESS  CARE IP CONSULT  NEUROLOGY GENERAL ADULT IP CONSULT  NUTRITION SERVICES ADULT IP CONSULT  PHARMACY IP CONSULT  WOUND OSTOMY CONTINENCE NURSE  IP CONSULT  NUTRITION SERVICES ADULT IP CONSULT  SPEECH LANGUAGE PATH ADULT IP CONSULT  ORTHOSIS EXTREMITY LOWER REFERRAL IP CONSULT    Code Status   Full Code    Time Spent on this Encounter   I, Randell Sinclair MD, personally saw the patient today and spent greater than 30 minutes discharging this patient.       Randell Sinclair MD  [facility] MED SURG ORTHOPEDIC  [address] [place] [id number]  Phone: [phone]  Fax: [phone]  ______________________________________________________________________    Physical Exam   Vital Signs: Temp: 98.1  F (36.7  C) Temp src: Oral BP: 112/53 Pulse: 78   Resp: 16 SpO2: 97 % O2 Device: None (Room air)    Weight: 138 lbs .13 oz  General: Awake, alert. Pleasant and interactive.  Mouth: edentulous   CV: Normal rate and rhythm, normal S1 and S2. No murmurs, rubs, gallops.   Respiratory: Breathing comfortably.   Abdomen: Soft, non-distended. Non-tender to palpation. No rebound tenderness or guarding.   MSK: RLE casted. Left lower extremity in boot. Joint contractures.   Extremities: Trace edema of the feet bilaterally  Skin: Warm, dry. No rash on visible skin.   Neuro: Alert. Oriented x3        Primary Care Physician   Carolina Pulliam    Discharge Orders      Home Care Referral      Reason for your hospital stay    You were admitted to the hospital for fractures of your legs after a fall.     Activity    Your activity upon discharge: non-weight bearing bilateral lower extremities     Adult [facility] Follow-up and recommended labs and tests    Follow up with Dr. Em week of 12/11/23   Follow up with primary care provider, Carolina Pulliam, as needed    Appointments on [place] and/or [facility] (with [facility] or [facility] provider or service). Call [phone] if you haven't heard regarding  these appointments within 7 days of discharge.     Miscellaneous DME Order    DME Documentation:   Describe the reason for need to support medical necessity: Multiple Sclerosis, Bilateral Broken Lower Extremities requiring a karyn lift for safe positioning and transferring from bed to chair.    PATIENT LIFT QUESTION:     Does the patient need Karyn to transfer between bed, chair, wheelchair, and commode or requires assistance of more than one person: Yes   Without the use of this lift patient would be bed confined?  Yes     I, the undersigned, certify that the above prescribed supplies are medically necessary for this patient and is both reasonable and necessary in reference to accepted standards of medical and necessary in reference to accepted standards of medical practice in the treatment of this patient's condition and is not prescribed as a convenience.     Commode    DME Documentation:   Describe the reason for need to support medical necessity: multiple sclerosis,  right distal femur fracture and left malleolus fracture confining her to a single room for safety.     I, the undersigned, certify that the above prescribed supplies are medically necessary for this patient and is both reasonable and necessary in reference to accepted standards of medical and necessary in reference to accepted standards of medical practice in the treatment of this patient's condition and is not prescribed as a convenience.     Hospital Bed    Hospital Bed Documentation:   A hospital bed is required for body positioning, to allow for safe transfers to wheelchair and standing and frequent changes in body position, not feasible in an ordinary bed. The patient requires positioning of the body in ways not feasible with an ordinary bed due to MS (multiple sclerosis), graves disease with osteoporosis that is expected to last a lifetime, and for alleviation of pain from right distal femur fracture and left malleolus fracture that is  expected to last greater than 6 months.  The patient requires frequent changes in body position due to multiple fractures, and MSRodrigo  Pt requires 2 person assist and is non weight bearing. These medical conditions are expected to last 6 months or longer. Because she is non weight bearing on her bilateral lower extremities she will require frequent positioning for pain control and healing. Pt will require a semi electric bed for safe positioning and transferring to accommodate this medical need.        I, the undersigned, certify that the above prescribed supplies are medically necessary for this patient and is both reasonable and necessary in reference to accepted standards of medical and necessary in reference to accepted standards of medical practice in the treatment of this patient's condition and is not prescribed as a convenience.     Diet    Follow this diet upon discharge: Orders Placed This Encounter      Room Service      Snacks/Supplements Adult: Ensure Enlive; With Meals      Combination Diet Regular Diet; Thin Liquids (level 0)       Significant Results and Procedures   Results for orders placed or performed during the hospital encounter of 11/16/23   XR Knee Left 3 Views    Narrative    EXAM: XR KNEE LEFT 3 VIEWS  LOCATION: [facility]  DATE: 11/17/2023    INDICATION: Fall, pain and swelling. History of MS.  COMPARISON: X-ray left femur 2 views (4 films) 03/22/2019 at 1316 hours.      Impression    IMPRESSION: Mild tricompartmental degenerative arthritis left knee without acute fracture, dislocation, effusion or calcified loose bodies. Postoperative changes of plate and screw fixation of previously healed distal left femoral metaphyseal fracture,   interval healing of the fracture lines. Intramedullary nail with proximal locking screws in the visualized left tibia. Visualized hardware is well seated with good alignment. No acute fracture or dislocation. Healed  fracture deformity of the proximal   metaphysis of the left fibula. No x-ray evidence of avascular necrosis. Demineralization of the visualized bones.   XR Knee Right 3 Views    Narrative    EXAM: XR KNEE RIGHT 3 VIEWS  LOCATION: [facility]  DATE: 11/17/2023    INDICATION: Pain after fall.  COMPARISON: Pelvis and right hip radiographs 10/30/2017.      Impression    IMPRESSION: Bones are demineralized. Acute comminuted and impacted fracture of the distal right femoral metadiaphysis without definite joint involvement. No significant joint effusion. Post ORIF of the proximal right femur. Hardware in satisfactory   position without complication detected.     XR Ankle Left G/E 3 Views    Narrative    EXAM: XR ANKLE LEFT G/E 3 VIEWS  LOCATION: [facility]  DATE: 11/17/2023    INDICATION: fall, pain, swelling, history of MS  COMPARISON: None.      Impression    IMPRESSION: Probable nondisplaced fracture of the medial malleolus. No other acute fracture or dislocation. The ankle mortise is intact. Postoperative change in the distal tibia and fibula. The bones are demineralized.   XR Pelvis 1/2 Views    Narrative    EXAM: XR PELVIS 1/2 VIEWS  LOCATION: [facility]  DATE: 11/17/2023    INDICATION: Fall. Swelling. History of MS.  COMPARISON: X-ray abdomen 2 views (3 films) 06/30/2021 at 1509 hours.      Impression    IMPRESSION: Demineralization of the visualized bones. Degenerative changes lower lumbar spine and joints of the pelvis. No acute fracture or dislocation. Dynamic hip screw and sideplate proximal right femur, partially visualized, unchanged. Prominent   amount of formed stool material within the visualized redundant colon.   XR Femur Right 2 Views    Narrative    EXAM: XR FEMUR RIGHT 2 VIEWS  LOCATION: [facility]  DATE:  11/17/2023    INDICATION: Pain after fall.  COMPARISON: Pelvis and right hip radiographs 10/30/2017.      Impression    IMPRESSION: Bones are demineralized. Acute comminuted and impacted fracture of the distal right femoral metadiaphysis without definite joint involvement. No significant joint effusion. Post ORIF of the proximal right femur. Hardware in satisfactory   position without complication detected.   CT Knee Right w/o Contrast    Narrative    CT KNEE RIGHT WITHOUT CONTRAST 11/17/2023 11:27 AM    CLINICAL HISTORY: distal femur fracture, rule out Hoffa's fragment.  Right knee pain.    TECHNIQUE: CT scan of the abdomen and pelvis was performed without IV  contrast. Multiplanar reformats were obtained. Dose reduction  techniques were used.  CONTRAST: None.    COMPARISON: 11/17/2023 radiographs.    FINDINGS:   Bones: There is an acute comminuted moderately displaced fracture of  the distal femoral metaphysis without significant angulation at the  fracture site. There are a few anteriorly displaced cortical fracture  fragments, as seen on series 2 image 23 and series 4 image 135.  Portions of this fracture extended intra-articularly at the level of  the trochlear groove as seen on series 2 image 40 and the anterior  portion of the intercondylar notch. Osteopenia. No evidence of  additional fractures.    Soft tissues: There is some more defined soft tissue stranding around  the femoral fracture site. There is moderate global atrophy of the  visualized knee and proximal calf musculature. Small knee joint  effusion. Evidence of intra-articular fracture fragments.      Impression    IMPRESSION:   1.  Acute comminuted mildly displaced fracture of the distal femoral  metaphysis with intra-articular extension.    JOCELYNN LYON MD         SYSTEM ID:  [id number]   XR Knee Port Right 1/2 Views    Narrative    EXAM: XR KNEE PORT RIGHT 1/2 VIEWS  LOCATION: [facility]  DATE:  11/18/2023    INDICATION: Fracture follow-up.  COMPARISON: 11/17/2023.      Impression    IMPRESSION: Impacted, mildly comminuted fracture of the distal femoral metaphysis. No change in fracture position or alignment. No significant deformity/displacement of the femoral condyles are normal in the joint alignment. Soft tissue swelling in the   knee. Bones are demineralized. Cast in place.   XR Chest Port 1 View    Narrative    Exam: XR CHEST PORT 1 VIEW, 11/18/2023 6:34 AM    Comparison: CT chest 6/16/2023    History: Endotracheal tube positioning    Findings:  Single portable supine view of the chest. Endotracheal tube tip  projects over the high/mid thoracic trachea. Enteric tube courses  below the diaphragm and out of the field of view.. Trachea is midline.  Cardiomediastinal silhouette is within normal limits. No focal  consolidation. No pneumothorax or pleural effusion. The visualized  upper abdomen is unremarkable. No acute osseous abnormalities.      Impression    Impression: Endotracheal tube tip projects over the mid thoracic  trachea. No confluent consolidation    I have personally reviewed the examination and initial interpretation  and I agree with the findings.    JOSY JACQUES MD         SYSTEM ID:  [id number]   XR Abdomen Port 1 View    Narrative    EXAMINATION:  XR ABDOMEN PORT 1 VIEW 11/18/2023     COMPARISON: None..    HISTORY: Check NG/OG tube placement.    FINDINGS: Two portable supine views of the abdomen and lower chest.  Enteric tube tip and sidehole project over the stomach. No pneumatosis  in the visualized abdomen. Surgical clips project over the right upper  quadrant. No portal venous gas.  The lung bases are unremarkable.  Nonobstructive bowel gas pattern. Moderate colonic stool burden.      Impression    IMPRESSION: Enteric tube tip and sidehole project over the stomach.  Moderate colonic stool burden    I have personally reviewed the examination and initial interpretation  and I agree  with the findings.    JOSY JACQUES MD         SYSTEM ID:  [id number]   CT Head w/o Contrast    Narrative    CT HEAD W/O CONTRAST 11/18/2023 9:23 AM    Provided History: AMS w/hx of recent trauma, rule out ICH    Comparison: MRI brain on 6 1/17, CT head 11/1/2017.    Technique: Using multidetector thin collimation helical acquisition  technique, axial, coronal and sagittal CT images from the skull base  to the vertex were obtained without intravenous contrast.     Findings:    No intracranial hemorrhage. No mass effect. No midline shift. No  extra-axial fluid collection. The gray to white matter differentiation  of the cerebral hemispheres is preserved. Ventricles are proportionate  to the sulci. No sulcal effacement. The basal cisterns are patent.  Moderate diffuse cerebral volume loss. Periventricular white matter  hypodensities which are nonspecific, but likely represent chronic  small vessel ischemic disease.    The visualized paranasal sinuses are clear. Left mastoid air cells are  clear. Chronic opacification of the right mastoid air cells.. Orbits  appear unremarkable. No acute fracture.      Impression    Impression:   1. No acute intracranial pathology.  2. Chronic changes including leukoaraiosis, diffuse cerebral volume  loss, right mastoid air cell opacification.    I have personally reviewed the examination and initial interpretation  and I agree with the findings.    MARY HEARN MD         SYSTEM ID:  [id number]   XR Chest Port 1 View    Narrative    Exam: XR CHEST PORT 1 VIEW, 11/19/2023 3:45 AM    Comparison: Chest x-ray 11/18/2023    History: ETT positioning    Findings:  Single portable supine view of the chest. Interval slight retraction  of the endotracheal tube with tip now projecting 5 cm above the  charlie, previously 2.8 cm. Enteric tube courses below the diaphragm  and out of the field-of-view. Trachea is midline. Cardiomediastinal  silhouette is within normal limits. No focal airspace  opacity. No  pneumothorax or pleural effusion. The visualized upper abdomen is  unremarkable. No acute osseous abnormalities.      Impression    Impression:   Slight interval retraction of the endotracheal tube with tip now  projecting 5 cm above the charlie, previously 3.8 cm.    I have personally reviewed the examination and initial interpretation  and I agree with the findings.    SUKHWINDER REZA MD         SYSTEM ID:  [id number]   MR Brain w/o & w Contrast    Narrative    EXAM: MR BRAIN W/O & W CONTRAST  11/19/2023 5:19 PM     HISTORY:  Concern for mental status change, concern for NCS seizures  and progressive MS       COMPARISON:  MRI 11/1/2017    TECHNIQUE: Multiplanar multisequence MRI of the brain performed with  and without contrast.    CONTRAST: 6ml Gadavist.    FINDINGS:    There is no mass effect, midline shift, or intracranial hemorrhage.  Multiple periventricular and subcortical white matter T2 hyperintense  foci, which appear similar in extent when compared to 11/1/2017 MRI.  Moderate cerebral atrophy. The ventricles are proportionate to the  cerebral sulci. Diffusion and susceptibility weighted images are  negative for acute/focal abnormality. Major intracranial vascular  structures are within normal limits.    No suspicious abnormality of the skull marrow signal. Clear paranasal  sinuses. Right greater than left mastoid effusions. No focal  abnormality of the pituitary gland, sella, skull base and upper  cervical spinal structures on sagittal images. The orbits are normal.      Impression    IMPRESSION:  1. No acute intracranial pathology to explain patient's  symptomatology.  2. Scattered T2 hyperintense foci throughout the subcortical and  periventricular white matter. Findings grossly unchanged when compared  to 11/1/2017 MRI and may be a sequelae of chronic small vessel  ischemic disease versus white matter demyelinating changes. No  abnormal intracranial enhancement is noted.  3. Moderate  cerebral atrophy.    I have personally reviewed the examination and initial interpretation  and I agree with the findings.    MARY HEARN MD         SYSTEM ID:  [id number]   XR Chest Port 1 View    Narrative    Exam: XR CHEST PORT 1 VIEW, 11/20/2023 9:58 PM    Comparison: Chest x-ray 11/19/2023    History: check et tube position    Findings:  Single portable semiupright view of the chest. Endotracheal tube  project over the mid thoracic trachea, stable in position.  Endotracheal tube cuff diameter  measures approximately 3.2 cm.  Enteric tube sidehole projects over the stomach.     Trachea is midline. Cardiomediastinal silhouette is within normal  limits. Mildly increased streaky perihilar and bibasilar opacities. No  pneumothorax or pleural effusion. The visualized upper abdomen is  unremarkable. No acute osseous abnormalities.      Impression    Impression:   1. Stable support devices. Endotracheal tube cuff diameter measures  approximately 3.2 cm raising concern for overinflation.  2. Mildly increased streaky perihilar and bibasilar opacities, likely  atelectasis/edema.    I have personally reviewed the examination and initial interpretation  and I agree with the findings.    ESTEFANI JOHN MD         SYSTEM ID:  [id number]   XR Ankle Left G/E 3 Views    Narrative    EXAM: XR ANKLE LEFT G/E 3 VIEWS  LOCATION: [facility]  DATE: 11/27/2023    INDICATION: medial mall fx, 1.5 weeks after injury  COMPARISON: None.      Impression    IMPRESSION: Postoperative changes of IM alexus fixation of the tibia with additional syndesmotic fixation. This is stable from the prior examination. There is slight irregularity of the lateral cortex of the lateral malleolus worrisome for a new fracture.   There is also a fracture of the medial malleolus but this was seen on the prior examination and does not appear significantly changed. Soft tissue swelling about the ankle. Diffuse  demineralization.   XR Knee Right 1/2 Views    Narrative    EXAM: XR KNEE RIGHT 1/2 VIEWS  LOCATION: [facility]  DATE: 11/27/2023    INDICATION: 1.5 weeks post casting distal femur fx  COMPARISON: 11/18/2023      Impression    IMPRESSION: Overlying immobilization material obscures osseous detail. Again seen is an impacted, comminuted fracture of the distal femoral metaphysis, fairly similar in alignment and appearance compared to the 11/18/2023 study. Otherwise, unchanged.     *Note: Due to a large number of results and/or encounters for the requested time period, some results have not been displayed. A complete set of results can be found in Results Review.       Discharge Medications   Current Discharge Medication List        START taking these medications    Details   cholestyramine (QUESTRAN) 4 g packet Take 1 packet (4 g) by mouth 2 times daily  Qty: 60 packet, Refills: 1    Associated Diagnoses: MS (multiple sclerosis) (H)      oxyCODONE (ROXICODONE) 5 MG tablet Take 0.5 tablets (2.5 mg) by mouth every 6 hours as needed for moderate pain  Qty: 15 tablet, Refills: 0    Associated Diagnoses: Other closed fracture of distal end of right femur, initial encounter (H)      polyethylene glycol (MIRALAX) 17 GM/Dose powder Take 17 g by mouth daily  Qty: 510 g, Refills: 1    Associated Diagnoses: Constipation, unspecified constipation type           CONTINUE these medications which have CHANGED    Details   gabapentin (NEURONTIN) 300 MG capsule Take 1 capsule (300 mg) by mouth 2 times daily  Qty: 60 capsule, Refills: 1    Associated Diagnoses: MS (multiple sclerosis) (H)           CONTINUE these medications which have NOT CHANGED    Details   acetaminophen (TYLENOL) 500 MG tablet Take 1-2 tablets (500-1,000 mg) by mouth every 8 hours as needed for mild pain or fever (greater than 101 degrees)      acyclovir (ZOVIRAX) 400 MG tablet Take 1 tablet (400 mg) by mouth every 12  hours  Qty: 180 tablet, Refills: 3    Associated Diagnoses: HSV (herpes simplex virus) infection      baclofen (LIORESAL) 10 MG tablet Take 10-30 mg by mouth 2 times daily Take 2 tablets (20 MG) by mouth every morning, 1 tablet (10 MG) by mouth daily in the afternoon as needed, and 3 tablets (30 MG) by mouth every evening before bedtime.      calcium carbonate 500 mg, elemental, (OSCAL 500) 1250 (500 Ca) MG TABS tablet Take 1 tablet by mouth daily      DULoxetine (CYMBALTA) 20 MG capsule Take 1 capsule (20 mg) by mouth daily Take along with 60 mg for a total of 80 mg  Qty: 90 capsule, Refills: 3    Associated Diagnoses: Moderate episode of recurrent major depressive disorder (H)      famotidine (PEPCID) 40 MG tablet Take 1 tablet (40 mg) by mouth At Bedtime  Qty: 90 tablet, Refills: 3    Associated Diagnoses: Gastroesophageal reflux disease without esophagitis      ferrous sulfate (FEROSUL) 325 (65 Fe) MG tablet Take 325 mg by mouth daily (with breakfast)      hydrochlorothiazide (HYDRODIURIL) 12.5 MG tablet Take 1 tablet (12.5 mg) by mouth every morning  Qty: 90 tablet, Refills: 3    Associated Diagnoses: Essential hypertension, benign      latanoprost (XALATAN) 0.005 % ophthalmic solution INSTILL 1 DROP INTO BOTH EYES EVERY DAY AS DIRECTED PT WILL NEED TO BE SEEN FOR FUTURE REFILLS      levothyroxine (SYNTHROID/LEVOTHROID) 100 MCG tablet TAKE 1 TABLET BY MOUTH 6 TIMES A WEEK  Qty: 30 tablet, Refills: 0    Associated Diagnoses: Postablative hypothyroidism      multivitamin w/minerals (THERA-VIT-M) tablet Take 1 tablet by mouth every evening      oxybutynin (DITROPAN-XL) 10 MG 24 hr tablet Take 1 tablet (10 mg) by mouth 2 times daily  Qty: 30 tablet    Associated Diagnoses: Neurogenic bladder           STOP taking these medications       Armodafinil 200 MG TABS Comments:   Reason for Stopping:         nitroFURantoin macrocrystal-monohydrate (MACROBID) 100 MG capsule Comments:   Reason for Stopping:              Allergies   Allergies   Allergen Reactions    Amantadine      hallucinations    Budesonide     Budesonide-Formoterol Fumarate Rash

## 2023-11-30 NOTE — PROGRESS NOTES
Care Management Follow Up    Length of Stay (days): 13    Expected Discharge Date: 11/30/2023     Concerns to be Addressed: discharge planning     Patient plan of care discussed at interdisciplinary rounds: Yes    Anticipated Discharge Disposition:  Home     Anticipated Discharge Services:  Home care for RN/PT/OT/HHA  Anticipated Discharge DME: Commode, Bed, Other (see comment) (lift)    Patient/family educated on Medicare website which has current facility and service quality ratings:    Education Provided on the Discharge Plan:  yes  Patient/Family in Agreement with the Plan: yes    Referrals Placed by CM/SW:  DME coordination and home care  Private pay costs discussed: Transportation    Additional Information:  Writer followed up on home care plan. DME orders of hospital bed, commode and lift placed with [facility]. Per Maru, liaison with [facility] all orders are complete and they will coordinate with pt's  Manuel for delivery today. Pt received education on DME with PT yesterday and will contact [employer] if there are further questions or concerns with equipment.     Pt has been accepted for home care services with [facility] for RN/PT/OT/HHA.    Ride arranged with [employer] for wheelchair ride, window time 3:40-4:20pm. Pt's  given potential OOP cost.     Contacts-   [employer]- for hospital bed, commode, and lift  Maru Marquez  E: [email or web address]  P: [phone] [place] [phone]  F: [phone]     Manuel for coordination of DME delivery this afternoon  [phone]    [facility]- for RN/PT/OT/HHA  Phone: [phone]  Fax: [phone]      Veronica Sanchez RNCC  Covering for 5 Ortho  Phone [phone]  Pager [phone]      SEARCHABLE in AMCOM - search CARE COORDINATOR      [place] [facility] [place] ([phone]) Saturday & Sunday; ([phone]) [place] Recognized Holidays    Weekend Pager--  Units: 5A, 5B & 5C  Pager: [phone]  Units:  6B, 6C & 6D    Pager: [phone]  Units: 7A, 7B, 7C & 7D    Pager: [phone]  Units: 6A & ICU   Pager: [phone]  Units: 5 Ortho, 5MS & WB ED Pager: [phone]  Units: 6MS, 8A & 10 ICU  Pager [phone]

## 2023-11-30 NOTE — PROGRESS NOTES
Care Management Discharge Note    Discharge Date: 11/30/2023       Discharge Disposition:      Discharge Services:      Discharge DME: Commode, Bed, Other (see comment) (lift)    Discharge Transportation: family or friend will provide, health plan transportation    Private pay costs discussed:  potential OOP transportation costs    Does the patient's insurance plan have a 3 day qualifying hospital stay waiver?  No    PAS Confirmation Code:    Patient/family educated on Medicare website which has current facility and service quality ratings:      Education Provided on the Discharge Plan:  yes  Persons Notified of Discharge Plans: Pt's spouse, bedside RN, provider  Patient/Family in Agreement with the Plan: yes    Handoff Referral Completed: Yes    Additional Information:  DME and home care coordinated and pt medically ready for discharge. See previous RNCC note for details.     Veronica Sanchez RNCC  Covering for 5 Ortho  Phone [phone]  Pager [phone]      SEARCHABLE in AMCOM - search CARE COORDINATOR      [place] [facility] [place] ([phone]) Saturday & Sunday; ([phone]) [place] Recognized Holidays    Weekend Pager--  Units: 5A, 5B & 5C  Pager: [phone]  Units: 6B, 6C & 6D    Pager: [phone]  Units: 7A, 7B, 7C & 7D    Pager: [phone]  Units: 6A & ICU   Pager: [phone]  Units: 5 Ortho, 5MS & WB ED Pager: [phone]  Units: 6MS, 8A & 10 ICU  Pager [phone]

## 2023-11-30 NOTE — PROGRESS NOTES
11/30/23  Care Management Follow Up    CHW scheduled a wheelchair ride for pt through [employer] ([phone]). Window time frame of ride is from 1540 to 1625. This was notified to the RNCC following, Charge RN, bedside RN, and pt.    Chela Guillory  Inpatient CHW  [facility] 5 Ortho, 8 Med Surge, & ED  PH: [phone]

## 2023-11-30 NOTE — PLAN OF CARE
VS: VSS, pt denied CP or SOB.   O2: Room air sat. > 90%.   Output: Voiding adequate amount, with external cathter in place.    Last BM: 11/28/23    Activity:  Repositioned in bed, using lift when out of bed.    Skin: Intact except some redness on buttocks/IT and left heel.   Pain: Comfortably manageable with PRN medication.    Neuro: CMS and neuro intact to baseline.    Dressing: CDI.    Diet: Regular tolerating okay.    LDA: Dcd none.    Equipment: IV pole and personal belongings.    Plan: Discharge home today.    Additional Info:          DISCHARGE SUMMARY    Pt discharging to: Home.  Transportation: [facility]  AVS given and discussed: Pt was given AVS and pt states understanding of content. Pt has no further questions.   Stoplight Tool given and discussed: NA  Medications given: Yes, discussed. No further questions.   Belongings returned: Yes, ensured all belongings packed and sent with pt. No items in security.   Comments:   Pt understand discharge plan.

## 2023-12-01 ENCOUNTER — TELEPHONE (OUTPATIENT)
Dept: ORTHOPEDICS | Facility: CLINIC | Age: 68
End: 2023-12-01
Payer: MEDICARE

## 2023-12-01 ENCOUNTER — PATIENT OUTREACH (OUTPATIENT)
Dept: CARE COORDINATION | Facility: CLINIC | Age: 68
End: 2023-12-01
Payer: MEDICARE

## 2023-12-01 NOTE — PROGRESS NOTES
Clinic Care Coordination Contact  [facility]: Post-Discharge Note  SITUATION                                                      Admission:    Admission Date: 11/16/23   Reason for Admission: MS, Toxic metabolic encephalopathy, multifactorial including hypercapnea, infection, and medication toxicity, improving  Discharge:   Discharge Date: 11/30/23  Discharge Diagnosis: MS, Toxic metabolic encephalopathy, multifactorial including hypercapnea, infection, and medication toxicity, improving    BACKGROUND                                                      Per hospital discharge summary and inpatient provider notes:  Hospital Course  Marylee Woods is a 68 year old woman with history of  multiple sclerosis, graves disease with hx of thyroid ablation,CKD, hypertension, sleep apnea, depression, 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 On 11/17 a rapid response was called as patient was found to be obtunded and transferred to the ICU after intubation for airway protection. Acute mental status change and respiratory failure thought likely multifactorial including narcotic pain medication, acute infection (UTI), and acute on chronic hypercapneic respiratory failure. Extubated to room air 11/21, and transferred to the general medical floor 11/22.      Toxic metabolic encephalopathy, multifactorial including hypercapnea, infection, and medication toxicity, improving   Rapid response was called 11/17 as patient was found to be obtunded and non-arousable. Attempts to arouse patient including Narcan were unsuccessful and was later transferred to the ICU where she was intubated for airway protection . With concerns for protecting her airway, patient was intubated. Head CT and Brain MRI on 11/19 without acute pathology. Neurology consulted.  EEG 11/20: When sedation was in use,  moderate to severe encephalopathy noted. When sedation was not in use, moderate diffuse encephalopathy was noted.They did not appreciate any seizures on EEG.      Mental status improved and returned to baseline. Clinical picture most consistent with resolving toxic metabolic encephalopathy related to hospitalization, chronic hypercapnea, opioid toxicity, and infection from UTI.  - Would continue to hold pta Nuvigil  - Careful use of opiate and other sedating medications      Progressive Multiple Sclerosis (wheelchair bound)   Patient has longstanding diagnosis of progressive MS now wheelchair bound. Follows with Immunology and Neurology outpatient. Last brain MRI in 2017 showing suspicion of demyelinating disease. Repeat brain MRI on 11/19/23 showed scattered T2 hyperintense foci throughout the subcorical and periventricular white matter grossly unchanged when compared to MRI on 11/1/2017. Unsure of exact biologics patient has was on, appears she has received Lemtrada (2017, 2019), and other past DMTs included Betaseron, Aubagio.   - Restarted PTA dose of Baclofen 20 mg QAM and 30 mg at bedtime  - Restarted PTA gabapentin at lower dose, 300 mg BID with good response. (Previously on 600 mg QID)  - Restarted pta oxybutynin for neurogenic bladder     Two outpatient mechanical falls   Right distal femur fracture and left malleolus fracture  Osteoporosis   - Orthopedics following and managing conservatively.   - NWB on bilateral lower extremities  - R leg long cast, L leg PRAFO   - Oxycodone 2.5 mg Q6 hours with good pain control  - PT consulted  - Follow up with Dr. Em week of 12/11/23      Klebsiella UTI  Recurrent UTI  UA was consistent with UTI. UC with Klebsiella pneumoniae. Suspect AMS related to recurrent UTI  - S/p ceftriaxone 1g q24h X 7 days (completed 11/24)     Major Depressive Disorder   - Continue Cymbalta     Sleep apnea 2/2 MS   Appears patient follows with sleep clinic/doctor and found to have  sleep apnea 2/2 her MS   - continue cpap per home routine     Tobacco use; current smoker      Hypercapnia - Resolved  History of pulmonary nodules   Recent CT Chest from 6/16/23 shows right lower lobe nodule has resolved with a stable left lower lobe nodule.   - Now on yearly chest CT follow-ups      Hx of hypertension  Required levophed in ICU for hypotension while intubated/sedated.   - Restarted PTA hydrochlorothiazide     GERD  risk for malnutrition  - pantoprazole 40 mg suspension daily  - nutrition followed     DENNISE (resolved)  CKD   Recurrent UTI   Hypophosphatemia  Cr peaked at 2.11 on 11/18. Baseline creatinine around 0.96-1.08. DENNISE with CKD concerns for any sedating related medications could have caused patient to become obtunded. Confirmed UTI as below; previous recurrent UTI with klebsiella pneuomiae as well. DENNISE resolved.      Hx Graves s/p thyroid ablation   - TSH 0.64   - Continue pta synthroid 100 mcg daily   - Hypoglycemia protocol      Herpes simplex virus; prophylaxis on Acyclovir   - Continue pta Acyclovir 400 mg (Changed to IV for now and can change back to tablet once swallow is evaluated and passed)     Anemia of chronic disease 2/2 CKD   Thrombocytopenia   Hgb 6.2 11/19. Was given 1 unit PRBCs.   - hemoglobin stable     Ductal carcinoma in situ of left breast   - Follows with outpatient radiation/oncology and medical oncology annually     ASSESSMENT           Discharge Assessment  How are you doing now that you are home?: I am doing good.  How are your symptoms? (Red Flag symptoms escalate to triage hotline per guidelines): Improved  Do you feel your condition is stable enough to be safe at home until your provider visit?: Yes  Does the patient have their discharge instructions? : Yes  Does the patient have questions regarding their discharge instructions? : No  Were you started on any new medications or were there changes to any of your previous medications? : No  Does the patient have all  of their medications?: Yes  Do you have questions regarding any of your medications? : No  Do you have all of your needed medical supplies or equipment (DME)?  (i.e. oxygen tank, CPAP, cane, etc.): Yes  Discharge follow-up appointment scheduled within 14 calendar days? : Yes  Discharge Follow Up Appointment Date: 12/11/23  Discharge Follow Up Appointment Scheduled with?: Specialty Care Provider (Pt will call to schedule with PMD.)    Post-op (CHW CTA Only)  If the patient had a surgery or procedure, do they have any questions for a nurse?: No    Post-op (Clinicians Only)  Did the patient have surgery or a procedure: Yes  Drainage: No  Fever: No  Chills: No  Redness: No  Warmth: No  Swelling: No  Incision site pain: No  Closure: none  Eating & Drinking: eating and drinking without complaints/concerns  Bowel Function: normal  Urinary Status: voiding without complaint/concerns        PLAN                                                      Outpatient Plan:  Patient/caregiver will call RNCC with questions, concerns, support needs. RNCC will be available as needed. Patient reports she has received her equipment and has heard from Home care. No other Care Coordination needs identified. Will send Care Coordination letter for future reference.     Future Appointments   Date Time Provider Department Center   1/29/2024  8:30 AM Neli Bean MD [facility]   2/14/2024  2:00 PM Carolina Pulliam MD [facility]   2/28/2024 11:30 AM Carolina Pulliam MD [facility]         For any urgent concerns, please contact our 24 hour nurse triage line: [phone] ([phone])         REVA Molina, RN, PHN   Care Coordinator-Ambulatory Care Management  [facility] and [facility]  [phone]

## 2023-12-01 NOTE — LETTER
[facility] CARE COORDINATION  [address]  [place] [address]    December 1, 2023    Marylee Woods  [address]  [place] [address]      Dear Marylee,    I am a clinic care coordinator who works with Carolina Pulliam MD with the [facility]. I wanted to thank you for spending the time to talk with me.  Below is a description of clinic care coordination and how I can further assist you.       The clinic care coordination team is made up of a registered nurse, , financial resource worker and community health worker who understand the health care system. The goal of clinic care coordination is to help you manage your health and improve access to the health care system. Our team works alongside your provider to assist you in determining your health and social needs. We can help you obtain health care and community resources, providing you with necessary information and education. We can work with you through any barriers and develop a care plan that helps coordinate and strengthen the communication between you and your care team.  Our services are voluntary and are offered without charge to you personally.    Please feel free to contact me with any questions or concerns regarding care coordination and what we can offer.      We are focused on providing you with the highest-quality healthcare experience possible.    Sincerely,     SAEID MolinaN, RN, PHN   Care Coordinator-Ambulatory Care Management  [facility] and [facility]  [phone]

## 2023-12-04 ENCOUNTER — TELEPHONE (OUTPATIENT)
Dept: ORTHOPEDICS | Facility: CLINIC | Age: 68
End: 2023-12-04
Payer: MEDICARE

## 2023-12-05 ENCOUNTER — TELEPHONE (OUTPATIENT)
Dept: ORTHOPEDICS | Facility: CLINIC | Age: 68
End: 2023-12-05
Payer: MEDICARE

## 2023-12-05 NOTE — TELEPHONE ENCOUNTER
[facility] Call Center    Phone Message    May a detailed message be left on voicemail: yes     Reason for Call: PT IS CALLING BACK TO SCHEDULE WITH DR BAIG FOR THE FOLLOW UP FROM HER 11/16/23 VISIT IN THE HOSPITAL. THERE ARE NO APPTS TILL MARCH PLEASE CALL HER AND SCHEDULE     Action Taken: [facility] [place][id number][place]    Travel Screening: Not Applicable

## 2023-12-07 ENCOUNTER — TELEPHONE (OUTPATIENT)
Dept: ORTHOPEDICS | Facility: CLINIC | Age: 68
End: 2023-12-07
Payer: MEDICARE

## 2023-12-07 ENCOUNTER — TELEPHONE (OUTPATIENT)
Dept: FAMILY MEDICINE | Facility: CLINIC | Age: 68
End: 2023-12-07
Payer: MEDICARE

## 2023-12-07 ENCOUNTER — MEDICAL CORRESPONDENCE (OUTPATIENT)
Dept: HEALTH INFORMATION MANAGEMENT | Facility: CLINIC | Age: 68
End: 2023-12-07

## 2023-12-07 NOTE — TELEPHONE ENCOUNTER
Home Care is calling regarding an established patient with [facility].       Requesting orders from: Carolina Pulliam  Provider is following patient: Yes  Is this a 60-day recertification request?  No    Orders Requested    Skilled Nursing  Request for initial evaluation and treatment (one time)     Occupational Therapy  Request for initial evaluation and treatment (one time)     Also requesting for a Home Health Aid for 3 visits.     Provider review needed.  RN will contact Home Care with information after provider review.  Confirmed ok to leave a detailed message with call back.  Contact information confirmed and updated as needed.    Conor Almendarez RN

## 2023-12-07 NOTE — TELEPHONE ENCOUNTER
Patient calling in, was discharged from the hospital 11/30 with a right femur fracture    They discharged her with a short course of oxycodone for pain. She is scheduled to see ortho 12/13 and only has 7 half tablets left. Her pharmacy told her to ask her PCP for a refill.    I did inform her that she should reach out to ortho who is helping with pain management for either a refill or other options for pain management until 12/13 appointment with them.     She completely understands and will do this.      SAEID HolleyN RN  [facility]

## 2023-12-07 NOTE — TELEPHONE ENCOUNTER
RNs:    Okay for services outlined in message below.  Thank you!    Thanks much,  Dr. Carolina Pulliam MD / [facility]

## 2023-12-07 NOTE — TELEPHONE ENCOUNTER
Writer called Camille MOODY from [facility] Care to give verbal orders.   Camille remembered that patient has a pressure ulcer stage 2 on left heel most likely due to a boot.   Would like wound care orders.       Home Care is calling regarding an established patient with [facility].       Requesting orders from: Carolina Pulliam  Provider is following patient: Yes  Is this a 60-day recertification request?  No    Orders Requested    Skilled Nursing  Request for continuation of care with no increase or decrease in frequency  Frequency:  Same as already requested        Wound Care Supplies    Nurse requesting wound care orders:   Pressure ulcer stage 2 to left heel.   Cleanse wound with wound cleanser  Apply antibiotic ointment to wound surface.   Cover with mepilex.     Information was gathered and will be sent to provider for review.  RN will contact Home Care with information after provider review.  Confirmed ok to leave a detailed message with call back.  Contact information confirmed and updated as needed.    Noy Cristobal RN

## 2023-12-07 NOTE — TELEPHONE ENCOUNTER
[facility] Call Center     Phone Message     May a detailed message be left on voicemail: Yes     Reason for Call:  Patient is calling to see if Dr. Em can prescribe her oxyCODONE (ROXICODONE) 5 MG tablet . She also got questions regarding her cast and would like to speak to her care team.

## 2023-12-08 ENCOUNTER — TELEPHONE (OUTPATIENT)
Dept: FAMILY MEDICINE | Facility: CLINIC | Age: 68
End: 2023-12-08
Payer: MEDICARE

## 2023-12-08 ENCOUNTER — TELEPHONE (OUTPATIENT)
Dept: ORTHOPEDICS | Facility: CLINIC | Age: 68
End: 2023-12-08
Payer: MEDICARE

## 2023-12-08 DIAGNOSIS — S72.491A OTHER CLOSED FRACTURE OF DISTAL END OF RIGHT FEMUR, INITIAL ENCOUNTER (H): ICD-10-CM

## 2023-12-08 RX ORDER — OXYCODONE HYDROCHLORIDE 5 MG/1
2.5 TABLET ORAL EVERY 6 HOURS PRN
Qty: 15 TABLET | Refills: 0 | Status: ON HOLD | OUTPATIENT
Start: 2023-12-08 | End: 2024-01-18

## 2023-12-08 NOTE — TELEPHONE ENCOUNTER
Writer spoke with Camille MOODY from [facility] and relayed verbal orders for wound care.     SAEID DoradoN RN  [facility]

## 2023-12-08 NOTE — TELEPHONE ENCOUNTER
Called and explained that we can not give out medications without doing surgery. Expalined she should reach out to her PCP for medication.     Vandana REINA

## 2023-12-08 NOTE — TELEPHONE ENCOUNTER
DIAGNOSIS:  Right distal femur fracture and left malleolus fracture / R leg long cast, L leg PRAFO    APPOINTMENT DATE:    NOTES STATUS DETAILS   OFFICE NOTE from referring provider SELF 12/08/2023 - Telephone Encounter   OFFICE NOTE from other specialist Internal    DISCHARGE SUMMARY from hospital Internal 11/16/2023 to 11/30/2203 - [facility]    OPERATIVE REPORT Internal 11/01/2018 - Left Femur ORIF    04/14/2013 - ORIF Left Tibia Fx Using IM Josh    03/19/2009 - Stabilization of Right Intertrochanteric Femur Fx   MEDICATION LIST Internal    IMPLANT RECORD/STICKER Internal    LABS     IMAGING Internal      
Home

## 2023-12-08 NOTE — TELEPHONE ENCOUNTER
Message addressed in separate encounter. Closing encounter.    Rita Choi RN on 12/8/2023 at 10:43 AM

## 2023-12-08 NOTE — TELEPHONE ENCOUNTER
Patient calls stating she was seen in ER and has a fractured femur on her right leg and a fractured ankle on the left.  Having a hard time getting around.  Patient has been taking Oxycodone 5 mg 1/2 tablet (2.5 mg) every 6 hours for pain.  She only has 5 doses left (2,5 tablets) and she is asking for refill.  Willing to do a phone or video visit.  Would need to arrange a ride from [facility] to get to the clinic and they do not do same day.    Ortho apparently told patient that because she did not have surgery on the fractures, they would not give any pain medication.   Please see Telephoine encunter from today for Sports Medicine    States she doesn't necessarily need oxycodone, but she does need something for pain.  Please advise   Kiya Mccabe RN  [facility]

## 2023-12-08 NOTE — TELEPHONE ENCOUNTER
I can cover her until her appointment with sports medicine next week, but no further as I would ask that they take over pain management after that as I haven't seen her for this.    But I'll send in prescription until her appointment with them next week.    Can you let her know?  Thanks,  Dr. Carolina Pulliam MD / [facility]

## 2023-12-08 NOTE — TELEPHONE ENCOUNTER
RNs:    Okay for services outlined in message below.  Thank you!    Thanks much,  Dr. Carolina Pulliam MD / [facility]

## 2023-12-08 NOTE — TELEPHONE ENCOUNTER
Patient informed refill sent and  will .  Advised will need to show ID to .  Kiya Mccabe RN  [facility]

## 2023-12-08 NOTE — TELEPHONE ENCOUNTER
[facility]    Phone Message    May a detailed message be left on voicemail: yes     Reason for Call: Other: Patient would like to get a refill on her Oxycodone 5mg because she is still in a lot of pain.  Also she has a lot of questions regarding her cast and surgery.  Please call patient back.  Thank you.     Action Taken: Other: [id number]    Travel Screening: Not Applicable

## 2023-12-13 ENCOUNTER — PRE VISIT (OUTPATIENT)
Dept: ORTHOPEDICS | Facility: CLINIC | Age: 68
End: 2023-12-13

## 2023-12-13 ENCOUNTER — ANCILLARY PROCEDURE (OUTPATIENT)
Dept: GENERAL RADIOLOGY | Facility: CLINIC | Age: 68
End: 2023-12-13
Attending: STUDENT IN AN ORGANIZED HEALTH CARE EDUCATION/TRAINING PROGRAM
Payer: MEDICARE

## 2023-12-13 ENCOUNTER — OFFICE VISIT (OUTPATIENT)
Dept: ORTHOPEDICS | Facility: CLINIC | Age: 68
End: 2023-12-13
Payer: MEDICARE

## 2023-12-13 DIAGNOSIS — M25.561 ACUTE PAIN OF RIGHT KNEE: ICD-10-CM

## 2023-12-13 DIAGNOSIS — M25.561 ACUTE PAIN OF RIGHT KNEE: Primary | ICD-10-CM

## 2023-12-13 DIAGNOSIS — L89.629 PRESSURE INJURY OF SKIN OF LEFT HEEL, UNSPECIFIED INJURY STAGE: Primary | ICD-10-CM

## 2023-12-13 DIAGNOSIS — S82.52XD CLOSED DISP FRACTURE OF LEFT MEDIAL MALLEOLUS WITH ROUTINE HEALING: ICD-10-CM

## 2023-12-13 DIAGNOSIS — S72.401D CLOSED FRACTURE OF DISTAL END OF RIGHT FEMUR WITH ROUTINE HEALING, UNSPECIFIED FRACTURE MORPHOLOGY, SUBSEQUENT ENCOUNTER: ICD-10-CM

## 2023-12-13 PROCEDURE — 73560 X-RAY EXAM OF KNEE 1 OR 2: CPT | Mod: RT | Performed by: RADIOLOGY

## 2023-12-13 PROCEDURE — 99214 OFFICE O/P EST MOD 30 MIN: CPT | Performed by: STUDENT IN AN ORGANIZED HEALTH CARE EDUCATION/TRAINING PROGRAM

## 2023-12-13 NOTE — PROGRESS NOTES
[facility]  Orthopaedic Surgery Consultation by Jero Em M.D.    Marylee Woods MRN# [id number]   Age: 68 year old YOB: 1955     Requesting physician: No ref. provider found  Carolina Pulliam     Background history:  DX:  Multiple sclerosis wheelchair-bound  Graves' disease  CKD  Hypertension  OSAS  Depressive disorder  Osteoporosis  Tobacco use disorder    TREATMENTS:  2009, right intertrochanteric femur fracture for which DHS, Dr. Xie  2013, ORIF tibia fracture with a medullary nail, Dr. Cast  2014, laparoscopic cholecystectomy  2018, ORIF left distal femur fracture, Dr. 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     On 11/17 a rapid response was called as patient was found to be obtunded and transferred to the ICU after intubation for airway protection. Acute mental status change and respiratory failure thought likely multifactorial including narcotic pain medication, acute infection (UTI), and acute on chronic hypercapneic respiratory failure.     Patient was admitted after falls with a displaced, comminuted right distal femur fracture and left minimally displaced medial malleolus fracture. Patient has a significant amount of hardware from prior orthopedic injuries.  Osteopenic appearing bone.  Wheelchair-bound.  Given patient's lower demand lifestyle and prior hardware in the right femur, concern of surgical intervention is the creation of a stress riser or increasing risk for a new fracture along with the risks associated with surgery/ORIF.  Alternatively we discussed the options of nonsurgical treatment.  After discussing these options with the  patient it was decided to proceed with nonsurgical treatment.  The right lower extremity of patient was placed in a long leg cast . With regards to her medial malleolus fracture, again given lower demand will plan to treat in a CAM boot.     Today patient states that she is doing relatively well.  She has pain that is controlled with oxycodone in the morning and baclofen and Tylenol throughout the day.  The cast appears to be well-fitted.  She has pain upon logroll and during personal care.  She has developed a small pressure sore on left buttock and left heel.  She is being prescribed her oxycodone via her primary care physician.    At baseline patient is able to transfer from bed to chair and walk a few feet with the assistance of a walker.    Social:   Occupation:   Living situation: With   Hobbies / Sports:    Smoking:Yes  Alcohol: No  Illicit drug use: No         Physical Exam:     EXAMINATION pertinent findings:   PSYCH: Pleasant, healthy-appearing, alert, oriented x3, cooperative. Normal mood and affect.  VITAL SIGNS: not currently breastfeeding.  Reviewed nursing intake notes.   There is no height or weight on file to calculate BMI.  RESP: non labored breathing   ABD: benign, soft, non-tender, no acute peritoneal findings  SKIN: grossly normal   LYMPHATIC: grossly normal, no adenopathy, no extremity edema  NEURO: grossly normal , no motor deficits  VASCULAR: satisfactory perfusion of all extremities   MUSCULOSKELETAL:   Alignment: Appears to be acceptable in cast.    Patient is seated in wheelchair.  Edema and small pressure sore left lower extremity.           Data:   All laboratory data reviewed  All imaging studies reviewed by me personally.    XR knee/femur right 12/13/2023:  My interpretation: Shortening of distal femur but adequate alignment in both AP and lateral imaging studies.  Appearance of callus formation around fracture site.  No signs of secondary displacement.  Severely osteopenic  bone.    XR ankle left 11/17/2023:  My interpretation: Minimally displaced medial malleolar fracture.         Assessment and Plan:   Assessment:  68-year-old female presenting for follow-up after minimally displaced and shortened distal femur fracture for which nonsurgical treatment was initiated by means of long-leg cast.  Also following up after minimally displaced left medial malleolar fracture for which cam boot was advised.     Plan:  I extensively discussed the fracture pattern and treatment options with the patient.  We discussed the reasons why it is thought that nonsurgical treatment would be more beneficial for her in the setting of her low demand lifestyle and severely osteopenic appearing bone.  We discussed that after cast immobilization her knee will be stiff and this would require physical therapy afterwards in an attempt to optimize this.  In regards to the developing pressure sores left buttock and left heel a referral to home care coordination was provided.  We discussed that at this point in time it is too early to remove the cast and I prefer an immobilization of 8 weeks rather than 6.  Patient and spouse understand and agree to the treatment plan as set forth.  We will follow-up with patient in approximately 4 weeks from now with renewed radiographic imaging studies.  All questions were answered.    Thank you for your referral.    Jero Em MD, PhD     Adult Reconstruction  [facility] Department of Orthopaedic Surgery    This note was created using dictation software and may contain errors.  Please contact the creator for any clarifications that are needed.    DATA for DOCUMENTATION:         Past Medical History:     Patient Active Problem List   Diagnosis    Anemia    MS (multiple sclerosis) (H)    L tib fx s/p IM nailing    UTI prophylaxis    History of fracture of fibula    History of tibial fracture    HSV (herpes simplex virus) infection     Hyperlipidemia with target LDL less than 130    Major depression in complete remission (H)    Epigastric pain    Lung nodules    Fracture of femur, distal, left, closed (H)    Former tobacco use    Candida esophagitis (H)    Community acquired pneumonia, unspecified laterality    CKD (chronic kidney disease) stage 3, GFR 30-59 ml/min (H)    Graves disease    Constipation    Acute cystitis without hematuria    Postablative hypothyroidism    Multiple thyroid nodules    Osteoporosis of multiple sites    Atypical ductal hyperplasia of left breast    Ductal carcinoma in situ (DCIS) of left breast    Treatment-emergent central sleep apnea    Contusion of bone    Acute pain of right knee     Past Medical History:   Diagnosis Date    Atypical ductal hyperplasia of left breast 02/2022    Depression     Gastro-oesophageal reflux disease     Graves disease     Multiple sclerosis (H)     Osteoporosis     Postablative hypothyroidism        Also see scanned health assessment forms.       Past Surgical History:     Past Surgical History:   Procedure Laterality Date    C/SECTION, LOW TRANSVERSE  1992    COLONOSCOPY  2007    COLONOSCOPY N/A 1/26/2017    Procedure: COMBINED COLONOSCOPY, SINGLE OR MULTIPLE BIOPSY/POLYPECTOMY BY BIOPSY;  Surgeon: Mayo Adkins MD, MD;  Location:  GI    ESOPHAGOSCOPY, GASTROSCOPY, DUODENOSCOPY (EGD), COMBINED  1/26/2017    Dr. Adkins [facility]    ESOPHAGOSCOPY, GASTROSCOPY, DUODENOSCOPY (EGD), COMBINED N/A 1/26/2017    Procedure: COMBINED ESOPHAGOSCOPY, GASTROSCOPY, DUODENOSCOPY (EGD), BIOPSY SINGLE OR MULTIPLE;  Surgeon: Mayo Adkins MD, MD;  Location:  GI    ESOPHAGOSCOPY, GASTROSCOPY, DUODENOSCOPY (EGD), COMBINED N/A 4/20/2023    Procedure: ESOPHAGOGASTRODUODENOSCOPY, WITH BIOPSY;  Surgeon: Cristina Zuniga MD;  Location:  GI    HIP SURGERY  2009    femur ortho surgery    LAPAROSCOPIC CHOLECYSTECTOMY  6/24/2014    Procedure: LAPAROSCOPIC CHOLECYSTECTOMY;  Surgeon: Randy Bailey,  MD;  Location: [facility]    LUMPECTOMY BREAST Left 3/31/2022    Procedure: LEFT Radiofrequency Identification Seed-Localized Lumpectomy;  Surgeon: Amanda Liu MD;  Location: [facility] OR    OPEN REDUCTION INTERNAL FIXATION FEMUR DISTAL Left 2018    Procedure: OPEN REDUCTION INTERNAL FIXATION LEFT FEMUR DISTAL;  Surgeon: Jose Valadez MD;  Location: [facility]    OPEN REDUCTION INTERNAL FIXATION RODDING INTRAMEDULLARY TIBIA  2013    Procedure: OPEN REDUCTION INTERNAL FIXATION RODDING INTRAMEDULLARY TIBIA;;  Surgeon: Sajan Cast MD;  Location: [facility] OR    ORTHOPEDIC SURGERY  2009    surgery right upper femur fx near hip            Social History:     Social History     Socioeconomic History    Marital status:      Spouse name: Not on file    Number of children: Not on file    Years of education: Not on file    Highest education level: Not on file   Occupational History    Not on file   Tobacco Use    Smoking status: Every Day     Packs/day: .25     Types: Cigarettes     Last attempt to quit: 2022     Years since quittin.9    Smokeless tobacco: Never   Vaping Use    Vaping Use: Never used   Substance and Sexual Activity    Alcohol use: Yes     Alcohol/week: 0.0 standard drinks of alcohol     Comment: occasional     Drug use: No    Sexual activity: Yes     Partners: Male   Other Topics Concern    Parent/sibling w/ CABG, MI or angioplasty before 65F 55M? Not Asked   Social History Narrative    Not on file     Social Determinants of Health     Financial Resource Strain: Not on file   Food Insecurity: Not on file   Transportation Needs: Not on file   Physical Activity: Not on file   Stress: Not on file   Social Connections: Not on file   Interpersonal Safety: Not on file   Housing Stability: Not on file            Family History:       Family History   Problem Relation Age of Onset    Heart Disease Maternal Grandmother     Cancer Maternal Grandfather     Heart Disease Paternal  Grandfather     Heart Disease Mother     Heart Disease Father     Diabetes No family hx of     Coronary Artery Disease No family hx of     Hypertension No family hx of     Hyperlipidemia No family hx of     Cerebrovascular Disease No family hx of     Breast Cancer No family hx of     Colon Cancer No family hx of     Prostate Cancer No family hx of     Other Cancer No family hx of     Depression No family hx of     Anxiety Disorder No family hx of     Mental Illness No family hx of     Substance Abuse No family hx of     Anesthesia Reaction No family hx of     Asthma No family hx of     Osteoporosis No family hx of     Genetic Disorder No family hx of     Thyroid Disease No family hx of     Obesity No family hx of     Unknown/Adopted No family hx of             Medications:     Current Outpatient Medications   Medication Sig    acetaminophen (TYLENOL) 500 MG tablet Take 1-2 tablets (500-1,000 mg) by mouth every 8 hours as needed for mild pain or fever (greater than 101 degrees)    acyclovir (ZOVIRAX) 400 MG tablet Take 1 tablet (400 mg) by mouth every 12 hours    baclofen (LIORESAL) 10 MG tablet Take 10-30 mg by mouth 2 times daily Take 2 tablets (20 MG) by mouth every morning, 1 tablet (10 MG) by mouth daily in the afternoon as needed, and 3 tablets (30 MG) by mouth every evening before bedtime.    calcium carbonate 500 mg, elemental, (OSCAL 500) 1250 (500 Ca) MG TABS tablet Take 1 tablet by mouth daily    cholestyramine (QUESTRAN) 4 g packet Take 1 packet (4 g) by mouth 2 times daily    DULoxetine (CYMBALTA) 20 MG capsule Take 1 capsule (20 mg) by mouth daily Take along with 60 mg for a total of 80 mg    famotidine (PEPCID) 40 MG tablet Take 1 tablet (40 mg) by mouth At Bedtime    ferrous sulfate (FEROSUL) 325 (65 Fe) MG tablet Take 325 mg by mouth daily (with breakfast)    gabapentin (NEURONTIN) 300 MG capsule Take 1 capsule (300 mg) by mouth 2 times daily    hydrochlorothiazide (HYDRODIURIL) 12.5 MG tablet Take 1  tablet (12.5 mg) by mouth every morning    latanoprost (XALATAN) 0.005 % ophthalmic solution INSTILL 1 DROP INTO BOTH EYES EVERY DAY AS DIRECTED PT WILL NEED TO BE SEEN FOR FUTURE REFILLS    levothyroxine (SYNTHROID/LEVOTHROID) 100 MCG tablet TAKE 1 TABLET BY MOUTH 6 TIMES A WEEK    multivitamin w/minerals (THERA-VIT-M) tablet Take 1 tablet by mouth every evening    oxybutynin (DITROPAN-XL) 10 MG 24 hr tablet Take 1 tablet (10 mg) by mouth 2 times daily    oxyCODONE (ROXICODONE) 5 MG tablet Take 0.5 tablets (2.5 mg) by mouth every 6 hours as needed for moderate pain    polyethylene glycol (MIRALAX) 17 GM/Dose powder Take 17 g by mouth daily     No current facility-administered medications for this visit.              Review of Systems:   A comprehensive 10 point review of systems (constitutional, ENT, cardiac, peripheral vascular, lymphatic, respiratory, GI, , Musculoskeletal, skin, Neurological) was performed and found to be negative except as described in this note.     See intake form completed by patient

## 2023-12-14 ENCOUNTER — TELEPHONE (OUTPATIENT)
Dept: ORTHOPEDICS | Facility: CLINIC | Age: 68
End: 2023-12-14
Payer: MEDICARE

## 2023-12-14 NOTE — TELEPHONE ENCOUNTER
[facility]    Phone Message    May a detailed message be left on voicemail: yes     Reason for Call: Other: Patient is requesting to speak to Vandana regarding the Primary care coordination referral and possible pressure ulcer please call      Action Taken: Message routed to:  [facility] Maria Antonia     Travel Screening: Not Applicable

## 2023-12-14 NOTE — TELEPHONE ENCOUNTER
Called and spoke with patient. Patient relates that [facility] home care called her and informed her that they only have two people that do home care and neither are available. The also advised that she call the main line.     I provided patient with the number that is provided on the website. Patient will try to call this.     Will route to team to follow up with patient.

## 2023-12-15 ENCOUNTER — PATIENT OUTREACH (OUTPATIENT)
Dept: CARE COORDINATION | Facility: CLINIC | Age: 68
End: 2023-12-15
Payer: MEDICARE

## 2023-12-15 NOTE — LETTER
[facility] CARE COORDINATION  [address]  [place] [address]    December 20, 2023    Marylee Woods  [address]  [facility] [address]      Dear Marylee,    I am a clinic care coordinator who works with Carolina Pulliam MD with the [facility]. I wanted to introduce myself and provide you with my contact information for you to be able to call me with any questions or concerns. Below is a description of clinic care coordination and how I can further assist you.       The clinic care coordination team is made up of a registered nurse, , financial resource worker and community health worker who understand the health care system. The goal of clinic care coordination is to help you manage your health and improve access to the health care system. Our team works alongside your provider to assist you in determining your health and social needs. We can help you obtain health care and community resources, providing you with necessary information and education. We can work with you through any barriers and develop a care plan that helps coordinate and strengthen the communication between you and your care team.  Our services are voluntary and are offered without charge to you personally.    Please feel free to contact me with any questions or concerns regarding care coordination and what we can offer.      We are focused on providing you with the highest-quality healthcare experience possible.    Sincerely,     Sarah Fajardo, CHW, B.A. [facility] Care Coordination  [facility]:   [facility]  [phone]

## 2023-12-15 NOTE — PROGRESS NOTES
Clinic Care Coordination Contact  [facility]/Voicemail    Clinical Data: Care Coordinator Outreach    Outreach Documentation Number of Outreach Attempt   12/15/2023   3:48 PM 1       CHW unable to leave a message on patient's phone.    Plan: Care Coordinator will try to reach patient again in 1-2 business days.    REBEKA House, B.A. [facility] Care Coordination  [facility]:   [employer]  [phone]

## 2023-12-18 ENCOUNTER — TELEPHONE (OUTPATIENT)
Dept: FAMILY MEDICINE | Facility: CLINIC | Age: 68
End: 2023-12-18
Payer: MEDICARE

## 2023-12-18 NOTE — TELEPHONE ENCOUNTER
Home Health Care    Reason for call:  Verbal Orders     Orders are needed for this patient.  Skilled Nursing: Patient refused visit last week for nursing, says to cancel it     Pt Provider: Dr. Pulliam    Phone Number Homecare Nurse can be reached at: [phone]    VM is acceptable

## 2023-12-19 NOTE — TELEPHONE ENCOUNTER
No thanks; if patient refused visit no further action needed on our part.  Dr. Carolina Pulliam MD / [facility]

## 2023-12-20 ENCOUNTER — TELEPHONE (OUTPATIENT)
Dept: FAMILY MEDICINE | Facility: CLINIC | Age: 68
End: 2023-12-20
Payer: MEDICARE

## 2023-12-20 NOTE — TELEPHONE ENCOUNTER
Mar OT [facility]     (Skilled nursing did go out to see her a few times)    OT 1 visit per week times 2 weeks and 1 visit every other week fo 4 weeks . They were there for initial visit      Home Care is calling regarding an established patient with [facility].       Requesting orders from: Carolina Pulliam  Provider is following patient: Yes  Is this a 60-day recertification request?  No    Orders Requested         Occupational Therapy  Request for continuation of care with no increase or decrease in frequency  Frequency:  1 visit per week times 2 weeks and 1 visit every other week for 4 weeks        Verbal orders given.  Home Care will send orders for provider to sign.    Mabel Bowman RN

## 2023-12-20 NOTE — PROGRESS NOTES
Clinic Care Coordination Contact  [facility]/Voicemail    Clinical Data: Care Coordinator Outreach    Outreach Documentation Number of Outreach Attempt   12/15/2023   3:48 PM 1   12/20/2023   1:21 PM 2       Left message on patient's voicemail with call back information and requested return call.    Plan: Care Coordinator will send care coordination introduction letter with care coordinator contact information and explanation of care coordination services via [employer] [facility] [employer]. Care Coordinator will do no further outreaches at this time.    Sarah Fajardo, CHW, B.A. [facility] Care Coordination  [facility]:   [facility]  [phone]

## 2023-12-26 ENCOUNTER — LAB (OUTPATIENT)
Dept: LAB | Facility: CLINIC | Age: 68
End: 2023-12-26
Payer: MEDICARE

## 2023-12-26 ENCOUNTER — NURSE TRIAGE (OUTPATIENT)
Dept: FAMILY MEDICINE | Facility: CLINIC | Age: 68
End: 2023-12-26
Payer: MEDICARE

## 2023-12-26 DIAGNOSIS — N39.0 RECURRENT URINARY TRACT INFECTION: ICD-10-CM

## 2023-12-26 LAB
ALBUMIN UR-MCNC: 30 MG/DL
APPEARANCE UR: ABNORMAL
BACTERIA #/AREA URNS HPF: ABNORMAL /HPF
BILIRUB UR QL STRIP: NEGATIVE
COLOR UR AUTO: YELLOW
GLUCOSE UR STRIP-MCNC: NEGATIVE MG/DL
HGB UR QL STRIP: ABNORMAL
KETONES UR STRIP-MCNC: 40 MG/DL
LEUKOCYTE ESTERASE UR QL STRIP: ABNORMAL
NITRATE UR QL: NEGATIVE
PH UR STRIP: 5.5 [PH] (ref 5–7)
RBC #/AREA URNS AUTO: ABNORMAL /HPF
SP GR UR STRIP: 1.01 (ref 1–1.03)
SQUAMOUS #/AREA URNS AUTO: ABNORMAL /LPF
UROBILINOGEN UR STRIP-ACNC: 0.2 E.U./DL
WBC #/AREA URNS AUTO: >100 /HPF
WBC CLUMPS #/AREA URNS HPF: PRESENT /HPF
YEAST #/AREA URNS HPF: ABNORMAL /HPF
YEAST #/AREA URNS HPF: ABNORMAL /HPF

## 2023-12-26 PROCEDURE — 87088 URINE BACTERIA CULTURE: CPT

## 2023-12-26 PROCEDURE — 81001 URINALYSIS AUTO W/SCOPE: CPT

## 2023-12-26 PROCEDURE — 87186 SC STD MICRODIL/AGAR DIL: CPT

## 2023-12-26 PROCEDURE — 87086 URINE CULTURE/COLONY COUNT: CPT

## 2023-12-26 NOTE — TELEPHONE ENCOUNTER
Difficult to get good history and symptom description per patient, but once she put phone on speaker for  to fill in gaps, clearly same symptoms as previous UTIs and he states she has a standing order for UA and was advised by Dr. Pulliam to just drop off a sample when needed.  They will drop a sample today.    Myriam Gonzalez RN, BSN, PHN  [facility]  [phone]      Reason for Disposition   Bad or foul-smelling urine    Additional Information   Negative: Difficult to awaken or acting confused (disoriented, slurred speech) and has diabetes mellitus   Negative: Difficult to awaken or acting confused (disoriented, slurred speech) and new-onset   Negative: Weakness of the face, arm, or leg on one side of the body and new-onset   Negative: Numbness of the face, arm, or leg on one side of the body and new-onset   Negative: Loss of speech or garbled speech and new-onset   Negative: Difficulty breathing and bluish (or gray) lips or face   Negative: Shock suspected (e.g., cold/pale/clammy skin, too weak to stand, low BP, rapid pulse)   Negative: Seeing or hearing or feeling things that are not there (i.e., auditory, visual, or tactile hallucinations)   Negative: Followed a head injury   Negative: Drug overdose suspected   Negative: Violent behavior, or threatening to physically hurt or kill someone   Negative: Sounds like a life-threatening emergency to the triager   Negative: Questions or concerns about alcohol use, unhealthy alcohol use, binge drinking, intoxication, or withdrawal   Negative: Questions or concerns about substance use (drug use), unhealthy drug use, intoxication, or withdrawal   Negative: Diabetes mellitus and confusion from low blood sugar (i.e., < 60 mg/dl or 3.5 mmol/l)   Negative: Headache or vomiting   Negative: Stiff neck (can't touch chin to chest)   Negative: Very strange or paranoid behavior   Negative: Fever > 100.4 F (38.0 C)   Negative: Patient sounds very  "sick or weak to the triager   Negative: Brief confusion (now gone)   Negative: Patient wants to be seen (or caregiver requests)   Negative: Shock suspected (e.g., cold/pale/clammy skin, too weak to stand, low BP, rapid pulse)   Negative: Sounds like a life-threatening emergency to the triager   Negative: Followed a female genital area injury (e.g., labia, vagina, vulva)   Negative: Followed a male genital area injury (penis, scrotum)   Negative: Vaginal discharge   Negative: Pus (white, yellow) or bloody discharge from end of penis   Negative: Pain or burning with passing urine (urination) and pregnant   Negative: Pain or burning with passing urine (urination) and female   Negative: Pain or burning with passing urine (urination) and male   Negative: Pain or itching in the vulvar area   Negative: Pain in scrotum is main symptom   Negative: Blood in the urine is main symptom   Negative: Symptoms arising from use of a urinary catheter (e.g., Coude, Verduzco)   Negative: Unable to urinate (or only a few drops) > 4 hours and bladder feels very full (e.g., palpable bladder or strong urge to urinate)   Negative: Decreased urination and drinking very little and dehydration suspected (e.g., dark urine, no urine > 12 hours, very dry mouth, very lightheaded)   Negative: Patient sounds very sick or weak to the triager   Negative: Fever > 100.4 F  (38.0 C)    Answer Assessment - Initial Assessment Questions  1. SYMPTOM: \"What's the main symptom you're concerned about?\" (e.g., frequency, incontinence)      States her behavior is different - falling asleep during the day - confused  2. ONSET: \"When did the  symptoms  start?\"      One week ago  3. PAIN: \"Is there any pain?\" If Yes, ask: \"How bad is it?\" (Scale: 1-10; mild, moderate, severe)      no  4. CAUSE: \"What do you think is causing the symptoms?\"      uti  5. OTHER SYMPTOMS: \"Do you have any other symptoms?\" (e.g., blood in urine, fever, flank pain, pain with urination)      " "Has had other symptoms with previous UTIs  6. PREGNANCY: \"Is there any chance you are pregnant?\" \"When was your last menstrual period?\"      no    Answer Assessment - Initial Assessment Questions  1. LEVEL OF CONSCIOUSNESS: \"How is he (she, the patient) acting right now?\" (e.g., alert-oriented, confused, lethargic, stuporous, comatose)      Alert knows person, place and time  2. ONSET: \"When did the confusion start?\"  (minutes, hours, days)      One week ago  3. PATTERN \"Does this come and go, or has it been constant since it started?\"  \"Is it present now?\"      intermittent  4. ALCOHOL or DRUGS: \"Has he been drinking alcohol or taking any drugs?\"       prescription  5. NARCOTIC MEDICINES: \"Has he been receiving any narcotic medications?\" (e.g., morphine, Vicodin)      oxycodone  6. CAUSE: \"What do you think is causing the confusion?\"       She thinks its a UTI  7. OTHER SYMPTOMS: \"Are there any other symptoms?\" (e.g., difficulty breathing, headache, fever, weakness)      *No Answer*    Protocols used: Urinary Symptoms-A-OH, Confusion - Delirium-A-OH    "

## 2023-12-27 ENCOUNTER — TELEPHONE (OUTPATIENT)
Dept: FAMILY MEDICINE | Facility: CLINIC | Age: 68
End: 2023-12-27
Payer: MEDICARE

## 2023-12-27 DIAGNOSIS — N39.0 URINARY TRACT INFECTION WITH HEMATURIA, SITE UNSPECIFIED: Primary | ICD-10-CM

## 2023-12-27 DIAGNOSIS — R31.9 URINARY TRACT INFECTION WITH HEMATURIA, SITE UNSPECIFIED: Primary | ICD-10-CM

## 2023-12-27 RX ORDER — SULFAMETHOXAZOLE/TRIMETHOPRIM 800-160 MG
1 TABLET ORAL 2 TIMES DAILY
Qty: 14 TABLET | Refills: 0 | Status: SHIPPED | OUTPATIENT
Start: 2023-12-27 | End: 2024-01-03

## 2023-12-27 NOTE — TELEPHONE ENCOUNTER
Writer spoke with patient and relayed message regarding UTI and antibiotic. Patient very appreciative.     ----- Message from Tamiko Kay DO sent at 12/27/2023  8:00 AM   Please call patient to make sure she gets this message- I sent 7 days of antibiotics based on her history of getting quite ill with UTIs in the past.  Thank you!    Noy Cristobal, SAEIDN HEIDY  [facility]

## 2023-12-28 ENCOUNTER — TELEPHONE (OUTPATIENT)
Dept: FAMILY MEDICINE | Facility: CLINIC | Age: 68
End: 2023-12-28
Payer: MEDICARE

## 2023-12-28 LAB — BACTERIA UR CULT: ABNORMAL

## 2023-12-28 NOTE — TELEPHONE ENCOUNTER
Provider Response to 2nd Level Triage Request    I have reviewed the RN documentation.     My recommendation is:  I recommend she be seen at [facility] or the ED today. Need to rule out new fracture or DVT. Best case scenario is this is just soft tissue swelling from the fall.  If she is not agreeable to this, then she should schedule a ride to the [facility] at 8am tomorrow (knowing that if her pain/swelling worsens, she becomes short of breath, chest pain, loss of sensation or discoloration of the leg/ankle, she should go to the ED immediately)    In the mean time, elevate, ice, tylenol as needed.     Dr. Figueredo

## 2023-12-28 NOTE — TELEPHONE ENCOUNTER
Dahiana RN with [facility] calling in to report patient update from visit today.     Fell out of her wheelchair on her deck last night- fall was unwitnessed.  Dropped her cigarettes, tried to reach out to get them and completely fell out of wheelchair.     Called her  who helped her get back into the wheelchair. Says she hit her left hip when she fell, but not having pain in this area. Nurse was not able to assess the hip since she was in the chair.     When HEIDY Talbot was assessing her today, saw her Left ankle was very swollen +3 pitting edema that goes up into the mid calf. This ankle already broke back in November and has been wearing a boot, but now seems to be more swollen that it was previously, per Dahiana- saw her two weeks ago and ankle was not this swollen. Patient denied to Dahiana that she twisted or landed on this ankle. Patient was asking if a lymphedema wrap could be put on this ankle to help with swelling. Dahiana RN denied noticing any warmth to touch, redness, or pain to the ankle/calf.    Unrelated, patients  keeps a stool log for her and Dahiana noted that on 12/20 and 12/23 they had logged she had black tarry stools that have since resolved. Dahiana says she didn't notice her seeming dizzy, weak, fatigued more than usual. BP stable today at 143/85.     [employer] [facility] has to be set up the day before, would potentially be up to getting seen tomorrow if need be.     I called patient as well to clarify that she didn't hit her head-- she confirms she did not hit her head. She says nothing hurts, she doesn't have any significant injuries. Patient confirmed she is not feeling dizzy, weak, extremely fatigued. Also denies shortness of breath. She does admit to having a DVT near her elbow many years ago. Not on anticoagulants.     REVA Holley RN  [facility]

## 2023-12-28 NOTE — TELEPHONE ENCOUNTER
I called patient, she said being evaluated today is not possible. She understands if she develops worsening pain/swelling, she becomes short of breath, chest pain, loss of sensation or discoloration of the leg/ankle she needs to go to ER right away even if that means calling 911.     She is willing to go to [facility] tomorrow.   I called [facility], spoke with Dr. Delgado, who will take her at 9:15 AM.    Called patient and let her know, she will set up ride with [employer].    SAEID HolleyN RN  [facility]

## 2023-12-29 ENCOUNTER — TRANSFERRED RECORDS (OUTPATIENT)
Dept: HEALTH INFORMATION MANAGEMENT | Facility: CLINIC | Age: 68
End: 2023-12-29

## 2023-12-29 ENCOUNTER — OFFICE VISIT (OUTPATIENT)
Dept: PEDIATRICS | Facility: CLINIC | Age: 68
End: 2023-12-29
Payer: MEDICARE

## 2023-12-29 ENCOUNTER — TELEPHONE (OUTPATIENT)
Dept: FAMILY MEDICINE | Facility: CLINIC | Age: 68
End: 2023-12-29

## 2023-12-29 VITALS
TEMPERATURE: 98.1 F | OXYGEN SATURATION: 99 % | DIASTOLIC BLOOD PRESSURE: 76 MMHG | HEART RATE: 75 BPM | SYSTOLIC BLOOD PRESSURE: 129 MMHG | WEIGHT: 138 LBS | HEIGHT: 68 IN | BODY MASS INDEX: 20.92 KG/M2 | RESPIRATION RATE: 16 BRPM

## 2023-12-29 DIAGNOSIS — M79.89 LEG SWELLING: Primary | ICD-10-CM

## 2023-12-29 PROCEDURE — 99213 OFFICE O/P EST LOW 20 MIN: CPT | Performed by: FAMILY MEDICINE

## 2023-12-29 ASSESSMENT — PAIN SCALES - GENERAL: PAINLEVEL: NO PAIN (0)

## 2023-12-29 NOTE — PROGRESS NOTES
Assessment & Plan     Leg swelling         We came to a shared decision to not proceed with imaging of the bones or with duplex ultrasound to assess for DVT as suspicion overall for new fracture or clot is low.     Should symptoms develop/escalate she can return to be evaluated    Vital signs stable, has remained afebrile. Jennings thru course of antibiotics at this time for UTI, if overall energy doesn't improve in the next day they understand to seek help right away.     Domenico Delgado MD   [facility]    Subjective Marylee is a 68 year old female who presents to clinic today for the following health issues:  Chief Complaint   Patient presents with    Joint Swelling     HPI  Patient is accompanied by her  after a home nurse aide arranged for patient to be seen today --       Fell out of her chair Weds evening while outside smoking slipping out of her motor chair -- was alert and able to speak. No neck pain/headache- sustained small abrasion to her left knee. Treated for UTI for positive culture on 12/26 -- treating with Bactrim - no fevers - -on day 2/3 of treatment. Her  is not concerned regarding swelling of the left lower extremity.    Hx of multiple sclerosis ( wheelchair bound).    Fracture Nov. 16th -- cast placed for femur. Recent R leg fracture      No breathing  difficulties. No prior hx of leg clots.    Patient Active Problem List    Diagnosis Date Noted    Contusion of bone 11/17/2023     Priority: Medium    Acute pain of right knee 11/17/2023     Priority: Medium    Treatment-emergent central sleep apnea 03/17/2023     Priority: Medium     reviewed last sleep note  using EPAP rather than CPAP  getting good sleep  Started Nuvigil for daytime sleepiness  Finding right dose      Ductal carcinoma in situ (DCIS) of left breast 04/14/2022     Priority: Medium    Atypical ductal hyperplasia of left breast 03/17/2022     Priority: Medium     Added automatically from request for  surgery [id number]      Osteoporosis of multiple sites 12/23/2021     Priority: Medium    Postablative hypothyroidism 08/16/2021     Priority: Medium    Multiple thyroid nodules 08/16/2021     Priority: Medium    Acute cystitis without hematuria 07/02/2021     Priority: Medium    Constipation 06/30/2021     Priority: Medium    Graves disease 06/10/2021     Priority: Medium    CKD (chronic kidney disease) stage 3, GFR 30-59 ml/min (H) 03/11/2020     Priority: Medium    Former tobacco use 05/04/2019     Priority: Medium    Candida esophagitis (H) 05/04/2019     Priority: Medium    Community acquired pneumonia, unspecified laterality 05/04/2019     Priority: Medium    Fracture of femur, distal, left, closed (H) 11/01/2018     Priority: Medium    Lung nodules 08/23/2017     Priority: Medium     Currently managed with follow up imaging by [facility] result Team.        Epigastric pain 05/04/2016     Priority: Medium    Major depression in complete remission (H) 01/08/2015     Priority: Medium    Hyperlipidemia with target LDL less than 130 12/23/2014     Priority: Medium    History of fracture of fibula 06/17/2014     Priority: Medium    History of tibial fracture 06/17/2014     Priority: Medium    HSV (herpes simplex virus) infection 06/17/2014     Priority: Medium    UTI prophylaxis 04/27/2013     Priority: Medium    L tib fx s/p IM nailing 04/13/2013     Priority: Medium    MS (multiple sclerosis) (H) 04/12/2013     Priority: Medium     Diagnosed in 1993  Followed by Dr. Redman; \[facility]      Anemia 11/27/2011     Priority: Medium       Current Outpatient Medications   Medication    acetaminophen (TYLENOL) 500 MG tablet    acyclovir (ZOVIRAX) 400 MG tablet    baclofen (LIORESAL) 10 MG tablet    calcium carbonate 500 mg, elemental, (OSCAL 500) 1250 (500 Ca) MG TABS tablet    cholestyramine (QUESTRAN) 4 g packet    DULoxetine (CYMBALTA) 20 MG capsule    famotidine (PEPCID) 40  "MG tablet    ferrous sulfate (FEROSUL) 325 (65 Fe) MG tablet    gabapentin (NEURONTIN) 300 MG capsule    hydrochlorothiazide (HYDRODIURIL) 12.5 MG tablet    latanoprost (XALATAN) 0.005 % ophthalmic solution    levothyroxine (SYNTHROID/LEVOTHROID) 100 MCG tablet    multivitamin w/minerals (THERA-VIT-M) tablet    oxybutynin (DITROPAN-XL) 10 MG 24 hr tablet    oxyCODONE (ROXICODONE) 5 MG tablet    polyethylene glycol (MIRALAX) 17 GM/Dose powder    sulfamethoxazole-trimethoprim (BACTRIM DS) 800-160 MG tablet     No current facility-administered medications for this visit.         Objective    /76 (BP Location: Right arm, Patient Position: Sitting, Cuff Size: Adult Regular)   Pulse 75   Temp 98.1  F (36.7  C) (Oral)   Resp 16   Ht 1.727 m (5' 8\")   Wt 62.6 kg (138 lb)   LMP  (LMP Unknown)   SpO2 99%   BMI 20.98 kg/m    Physical Exam       GEN: somnolent but follows instructions appropriately  Eyes: PERRL  Head: no lacerations/hematomas. Negative racoon/augilar's.   Left knee: <2mm abrasion, no pain with palpation   Left lower leg: minimal discomfort of posterior leg ( ending at the area of her leg splint)   Right leg: Cast in place extending from upper thigh to foot  No results found for any visits on 12/29/23.          The use of Dragon/LINAGORA dictation services may have been used to construct the content in this note; any grammatical or spelling errors are non-intentional. Please contact the author of this note directly if you are in need of any clarification.   "

## 2023-12-29 NOTE — RESULT ENCOUNTER NOTE
Hello -    Here are my comments about your recent results:  -Urine culture is abnormal and grew out Klebsiella bacteria that are sensitive to the antibiotic bactrim you have been given.  Complete the medication as prescribed and if you experience new, worsening or persistent symptoms, you should call or return for a recheck.  For additional lab test information, labtestsonline.org is an excellent reference..    Please let us know if you have any questions or concerns.     Regards,  Suzie Nogueira MD

## 2023-12-29 NOTE — PROGRESS NOTES
"{PROVIDER CHARTING PREFERENCE:586421}    Subjective   Marylee is a 68 year old, presenting for the following health issues:  Joint Swelling      HPI     Evaluation for possible DVT  Onset/Duration: Wednesday night she fell from the chair  Description:       Location: Right ankle       Redness: no        Pain: mild       Warmth: no        Joint swelling no   Progression of symptoms {.:645322}  Accompanying signs and symptoms:       Fevers: no        Numbness/tingling/weakness: {.:315553}       Chest pain/pleurisy: no        Shortness of breath: no   History        Trauma: YES- fall from chair        Recent travel/when: no         Previous history of DVT: no         Family history of DVT: no         Recent surgery: no   Aggravating factors include: none  Therapies tried and outcome: nothing  Prior surgery on arteries of veins in this area: No  {Link to age adjusted D-dimer (UTD)   :658789}        Review of Systems   {ROS COMP (Optional):068847}      Objective    /76 (BP Location: Right arm, Patient Position: Sitting, Cuff Size: Adult Regular)   Pulse 75   Temp 98.1  F (36.7  C) (Oral)   Resp 16   Ht 1.727 m (5' 8\")   Wt 62.6 kg (138 lb)   LMP  (LMP Unknown)   SpO2 99%   BMI 20.98 kg/m    Body mass index is 20.98 kg/m .  Physical Exam   {Exam List (Optional):897480}    {Diagnostic Test Results (Optional):667878}    {AMBULATORY ATTESTATION (Optional):082138}              "

## 2023-12-29 NOTE — TELEPHONE ENCOUNTER
Forms/Letter Request    Type of form/letter:  Home Health Certification    Have you been seen for this request: N/A    Do we have the form/letter: Yes: Placed in care team 4    Who is the form from? Home care    Where did/will the form come from? form was faxed in    When is form/letter needed by: as soon as able    How would you like the form/letter returned: Fax : [phone]

## 2023-12-30 NOTE — TELEPHONE ENCOUNTER
Will complete when I am at [place] on Wed 1/3.  Dr. Carolina Pulliam MD / [facility]

## 2024-01-02 ENCOUNTER — NURSE TRIAGE (OUTPATIENT)
Dept: FAMILY MEDICINE | Facility: CLINIC | Age: 69
End: 2024-01-02
Payer: MEDICARE

## 2024-01-02 NOTE — TELEPHONE ENCOUNTER
Nurse Triage SBAR    Is this a 2nd Level Triage? YES, LICENSED PRACTITIONER REVIEW IS REQUIRED    Situation/Background: Treated for UTI on 12/27. Current course of abx will be finished tomorrow    Assessment: UTI sx have improved some but not completely resolved  -still some burning and frequency  Patient reports that people around her are still saying she is less coherent, cognition still not back to 100%, confused at times during conversation  Noticing more fatigue than baseline  Patient reports that 2-3 days ago she was having visual hallucinations but this has since resolved  Patient states she is able to get adequate PO fluid intake  Sores on butt, patient feels these are pressure sores  -treating them with an OTC ointment her  is helping her apply    Protocol Recommended Disposition:   See in Office Today or Tomorrow    Disposition: Patient does sound at least slightly confused (writer does not have a baseline as have not had much previous interaction with this patient), which she endorses as well. Patient would like to  be seen for non-resolved sx, writer in agreement. Patient states she is unable to present to the clinic today, but could be seen tomorrow. Scheduled for 1/3/24 with Dr. Celaya for evaluation of sx. Patient has assistance at home from . Reviewed worsening UTI sx as well as the need to call us back with worsening confusion, hallucinations recurring, etc. The patient indicates understanding of these issues and agrees with the plan.    Routing as 2nd level triage for review by provider.    SAEID CarnesN, RN  [facility]    Reason for Disposition   Patient wants to be seen    Additional Information   Negative: SEVERE difficulty breathing (e.g., struggling for each breath, speaks in single words)   Negative: Sounds like a life-threatening emergency to the triager   Negative: Sinus infection and taking an antibiotic   Negative: Wound infection and taking  an antibiotic    Protocols used: Infection on Antibiotic Follow-up Call-A-[place]

## 2024-01-02 NOTE — TELEPHONE ENCOUNTER
Paco Huerta PA-C Vandeputte, Alex K, RN; [place] Nurse Ow[place]3 minutes ago (11:38 AM)     AS  As long as mentation is improving and patient has remained afebrile, I feel that it is appropriate that she is seen tomorrow.  If she has any worsening symptoms or relapse of her hallucinations she should be seen in the emergency room ASAP.    Paco Huerta PA-C       I called patient and went over recommendations above. She is in agreement with plan. She did take her temperature while we were on the phone which was 98.3.    SAEID HolleyN RN  [facility]

## 2024-01-03 ENCOUNTER — OFFICE VISIT (OUTPATIENT)
Dept: FAMILY MEDICINE | Facility: CLINIC | Age: 69
End: 2024-01-03
Payer: MEDICARE

## 2024-01-03 ENCOUNTER — MEDICAL CORRESPONDENCE (OUTPATIENT)
Dept: HEALTH INFORMATION MANAGEMENT | Facility: CLINIC | Age: 69
End: 2024-01-03

## 2024-01-03 ENCOUNTER — OFFICE VISIT (OUTPATIENT)
Dept: PEDIATRICS | Facility: CLINIC | Age: 69
End: 2024-01-03
Payer: MEDICARE

## 2024-01-03 ENCOUNTER — TELEPHONE (OUTPATIENT)
Dept: FAMILY MEDICINE | Facility: CLINIC | Age: 69
End: 2024-01-03

## 2024-01-03 VITALS
SYSTOLIC BLOOD PRESSURE: 132 MMHG | TEMPERATURE: 97.4 F | DIASTOLIC BLOOD PRESSURE: 78 MMHG | HEART RATE: 91 BPM | OXYGEN SATURATION: 99 % | RESPIRATION RATE: 20 BRPM

## 2024-01-03 VITALS
HEART RATE: 81 BPM | RESPIRATION RATE: 13 BRPM | OXYGEN SATURATION: 98 % | HEIGHT: 68 IN | TEMPERATURE: 97.5 F | DIASTOLIC BLOOD PRESSURE: 73 MMHG | BODY MASS INDEX: 20.98 KG/M2 | SYSTOLIC BLOOD PRESSURE: 121 MMHG

## 2024-01-03 DIAGNOSIS — E87.1 HYPONATREMIA: ICD-10-CM

## 2024-01-03 DIAGNOSIS — L89.629 PRESSURE INJURY OF SKIN OF LEFT HEEL, UNSPECIFIED INJURY STAGE: ICD-10-CM

## 2024-01-03 DIAGNOSIS — R79.89 ELEVATED SERUM CREATININE: ICD-10-CM

## 2024-01-03 DIAGNOSIS — N30.01 ACUTE CYSTITIS WITH HEMATURIA: Primary | ICD-10-CM

## 2024-01-03 DIAGNOSIS — M53.3 SACRAL PAIN: ICD-10-CM

## 2024-01-03 DIAGNOSIS — L89.309 PRESSURE INJURY OF SKIN OF BUTTOCK, UNSPECIFIED INJURY STAGE, UNSPECIFIED LATERALITY: Primary | ICD-10-CM

## 2024-01-03 DIAGNOSIS — N10 PYELONEPHRITIS, ACUTE: Primary | ICD-10-CM

## 2024-01-03 DIAGNOSIS — N18.2 STAGE 2 CHRONIC KIDNEY DISEASE: ICD-10-CM

## 2024-01-03 DIAGNOSIS — L89.156 PRESSURE INJURY OF DEEP TISSUE OF SACRAL REGION: ICD-10-CM

## 2024-01-03 DIAGNOSIS — R41.89 ALTERATION IN COGNITION: ICD-10-CM

## 2024-01-03 DIAGNOSIS — L89.309 PRESSURE INJURY OF SKIN OF BUTTOCK, UNSPECIFIED INJURY STAGE, UNSPECIFIED LATERALITY: ICD-10-CM

## 2024-01-03 DIAGNOSIS — G35 MS (MULTIPLE SCLEROSIS) (H): ICD-10-CM

## 2024-01-03 LAB
ALBUMIN UR-MCNC: 30 MG/DL
AMORPH CRY #/AREA URNS HPF: ABNORMAL /HPF
ANION GAP SERPL CALCULATED.3IONS-SCNC: 10 MMOL/L (ref 7–15)
APPEARANCE UR: ABNORMAL
BACTERIA #/AREA URNS HPF: ABNORMAL /HPF
BASOPHILS # BLD AUTO: 0.1 10E3/UL (ref 0–0.2)
BASOPHILS NFR BLD AUTO: 1 %
BILIRUB UR QL STRIP: ABNORMAL
BUN SERPL-MCNC: 25.2 MG/DL (ref 8–23)
CALCIUM SERPL-MCNC: 9.6 MG/DL (ref 8.8–10.2)
CHLORIDE SERPL-SCNC: 87 MMOL/L (ref 98–107)
COLOR UR AUTO: YELLOW
CREAT SERPL-MCNC: 1.5 MG/DL (ref 0.51–0.95)
DEPRECATED HCO3 PLAS-SCNC: 29 MMOL/L (ref 22–29)
EGFRCR SERPLBLD CKD-EPI 2021: 38 ML/MIN/1.73M2
EOSINOPHIL # BLD AUTO: 0.2 10E3/UL (ref 0–0.7)
EOSINOPHIL NFR BLD AUTO: 3 %
ERYTHROCYTE [DISTWIDTH] IN BLOOD BY AUTOMATED COUNT: 14.9 % (ref 10–15)
GLUCOSE SERPL-MCNC: 90 MG/DL (ref 70–99)
GLUCOSE UR STRIP-MCNC: NEGATIVE MG/DL
HCT VFR BLD AUTO: 32 % (ref 35–47)
HGB BLD-MCNC: 10.9 G/DL (ref 11.7–15.7)
HGB UR QL STRIP: ABNORMAL
IMM GRANULOCYTES # BLD: 0 10E3/UL
IMM GRANULOCYTES NFR BLD: 0 %
KETONES UR STRIP-MCNC: ABNORMAL MG/DL
LEUKOCYTE ESTERASE UR QL STRIP: ABNORMAL
LYMPHOCYTES # BLD AUTO: 1.2 10E3/UL (ref 0.8–5.3)
LYMPHOCYTES NFR BLD AUTO: 19 %
MCH RBC QN AUTO: 30.9 PG (ref 26.5–33)
MCHC RBC AUTO-ENTMCNC: 34.1 G/DL (ref 31.5–36.5)
MCV RBC AUTO: 91 FL (ref 78–100)
MONOCYTES # BLD AUTO: 0.3 10E3/UL (ref 0–1.3)
MONOCYTES NFR BLD AUTO: 5 %
MUCOUS THREADS #/AREA URNS LPF: PRESENT /LPF
NEUTROPHILS # BLD AUTO: 4.5 10E3/UL (ref 1.6–8.3)
NEUTROPHILS NFR BLD AUTO: 72 %
NITRATE UR QL: NEGATIVE
PH UR STRIP: 6 [PH] (ref 5–7)
PLATELET # BLD AUTO: 267 10E3/UL (ref 150–450)
POTASSIUM SERPL-SCNC: 3.7 MMOL/L (ref 3.4–5.3)
RBC # BLD AUTO: 3.53 10E6/UL (ref 3.8–5.2)
RBC #/AREA URNS AUTO: ABNORMAL /HPF
SODIUM SERPL-SCNC: 126 MMOL/L (ref 135–145)
SP GR UR STRIP: 1.01 (ref 1–1.03)
SQUAMOUS #/AREA URNS AUTO: ABNORMAL /LPF
TRANS CELLS #/AREA URNS HPF: ABNORMAL /HPF
UROBILINOGEN UR STRIP-ACNC: 0.2 E.U./DL
WBC # BLD AUTO: 6.2 10E3/UL (ref 4–11)
WBC #/AREA URNS AUTO: >100 /HPF
YEAST #/AREA URNS HPF: ABNORMAL /HPF
YEAST #/AREA URNS HPF: ABNORMAL /HPF

## 2024-01-03 PROCEDURE — 87040 BLOOD CULTURE FOR BACTERIA: CPT | Performed by: NURSE PRACTITIONER

## 2024-01-03 PROCEDURE — 80048 BASIC METABOLIC PNL TOTAL CA: CPT | Performed by: NURSE PRACTITIONER

## 2024-01-03 PROCEDURE — 85025 COMPLETE CBC W/AUTO DIFF WBC: CPT | Performed by: NURSE PRACTITIONER

## 2024-01-03 PROCEDURE — 99417 PROLNG OP E/M EACH 15 MIN: CPT | Performed by: FAMILY MEDICINE

## 2024-01-03 PROCEDURE — 99215 OFFICE O/P EST HI 40 MIN: CPT | Mod: 25 | Performed by: NURSE PRACTITIONER

## 2024-01-03 PROCEDURE — 99417 PROLNG OP E/M EACH 15 MIN: CPT | Performed by: NURSE PRACTITIONER

## 2024-01-03 PROCEDURE — 99215 OFFICE O/P EST HI 40 MIN: CPT | Performed by: FAMILY MEDICINE

## 2024-01-03 PROCEDURE — 87086 URINE CULTURE/COLONY COUNT: CPT | Performed by: FAMILY MEDICINE

## 2024-01-03 PROCEDURE — 96372 THER/PROPH/DIAG INJ SC/IM: CPT | Performed by: NURSE PRACTITIONER

## 2024-01-03 PROCEDURE — 36415 COLL VENOUS BLD VENIPUNCTURE: CPT | Performed by: NURSE PRACTITIONER

## 2024-01-03 PROCEDURE — 81001 URINALYSIS AUTO W/SCOPE: CPT | Performed by: FAMILY MEDICINE

## 2024-01-03 RX ORDER — RESPIRATORY SYNCYTIAL VIRUS VACCINE 120MCG/0.5
0.5 KIT INTRAMUSCULAR ONCE
Qty: 1 EACH | Refills: 0 | Status: CANCELLED | OUTPATIENT
Start: 2024-01-03 | End: 2024-01-03

## 2024-01-03 RX ORDER — CEPHALEXIN 500 MG/1
500 CAPSULE ORAL 2 TIMES DAILY
Qty: 14 CAPSULE | Refills: 0 | Status: ON HOLD | OUTPATIENT
Start: 2024-01-03 | End: 2024-01-18

## 2024-01-03 RX ORDER — CEFTRIAXONE SODIUM 1 G
1 VIAL (EA) INJECTION ONCE
Status: COMPLETED | OUTPATIENT
Start: 2024-01-03 | End: 2024-01-03

## 2024-01-03 RX ADMIN — Medication 1 G: at 15:50

## 2024-01-03 ASSESSMENT — PATIENT HEALTH QUESTIONNAIRE - PHQ9
SUM OF ALL RESPONSES TO PHQ QUESTIONS 1-9: 9
10. IF YOU CHECKED OFF ANY PROBLEMS, HOW DIFFICULT HAVE THESE PROBLEMS MADE IT FOR YOU TO DO YOUR WORK, TAKE CARE OF THINGS AT HOME, OR GET ALONG WITH OTHER PEOPLE: VERY DIFFICULT
SUM OF ALL RESPONSES TO PHQ QUESTIONS 1-9: 9

## 2024-01-03 ASSESSMENT — PAIN SCALES - GENERAL
PAINLEVEL: MODERATE PAIN (4)
PAINLEVEL: NO PAIN (0)

## 2024-01-03 NOTE — PATIENT INSTRUCTIONS
return for CT scan to [facility] at 230 PM tomorrow.    Take all medications as prescribed.    If she becomes more lethargic please call an ambulance to take her to the hospital.      Primary care provider has been asked to order your dressing supplies.    Recheck labs in 1 week    Add table salt to foods or eat some salty foods over the next couple of days.

## 2024-01-03 NOTE — Clinical Note
Her sodium and kidney function test came back abnormal.  These were discussed with the patient but she will need close follow-up.  Please have your office contact her to recheck labs and set up follow-up visit with you.

## 2024-01-03 NOTE — TELEPHONE ENCOUNTER
Dr. Pulliam --    Please review and advise.   ADS sent over this list:   Sacral: wound cleanser, loaf of 4x4 gauze, mepilex - sacral heart shape.   Lotion: Cetaphil.   Heel: 4x4 mepilex.   Mapping for wheelchair. Pended below.     Medicare will most likely not pay for Cetaphil.   We will need measurements for the heel in order to obtain the 4x4 Mepilex.   Can we order mapping for patient's wheelchair.     There are a few things we need according to Taye from [facility] in order to submit request to Medicare:     Office Note (must include the following):  - Type of wounds  - Location of wounds  - size of wounds  - amount of drainage  - primary and secondary dressings needed    This could be added into the note if possible:   Pressure ulcer stage 2 on left superior- medial sacral area. 1cm x 1cm x 0.2 cm  Pressure ulcer stage 2 on left inferior-medial sacral area 1cm x 1cm x 0.2 cm  Pressure ulcer unstageable: 3cm x 5cm   Moderate drainage.   Primary dressing: Mepilex sacral (heart shaped) bandage.   Secondary dressing: 4x4 gauze for cleansing wound with wound cleanser    Wound Care Patient Instructions   Cleanse wound with wound cleanser and pat dry with 4x4 gauze.   Cover the wound with Mepilex sacral (heart shaped) bandage. As wounds heal dressing can be reduced to 4x4 Mepilex.  Change dressing daily unless soiled.     Follow Up  Continue to do the dressing changes as ordered until your follow up appointment.   Call your provider if there is an increase in redness, drainage or you develop a fever.     2. Rx.   Writer has filled out the Rx as needed.     Noy Cristobal, SAEIDN HEIDY  [facility]

## 2024-01-03 NOTE — NURSING NOTE
Writer viewed wounds on sacral area. Two stage 2 pressure ulcers both about the size of a dime. A third area that is lateral to superior pressure ulcer is unstageable. Dark red-black area about 2 cm x 2 cm.     Patient sent to [facility] for assessment of possible osteomyelitis to unstageable area.     Spouse educated on care of pressure ulcers:  -- repositioning at the very least every 2 hours. Stay off of backside as much as possible.   -- wound care: cleanse with wound cleanser and pat dry. Place large coccyx heart shaped mepilex over area to protect. May turn Mepilex up side down for better coverage.   -- wound is not wet at this time so does not need Desitin to protect jana-skin.     Noy Cristobal, BSN HEIDY  [facility]

## 2024-01-03 NOTE — PROGRESS NOTES
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     Acute cystitis with hematuria  Alteration in cognition    Some improvement in malaise, urinary frequency/incontinence and cognition - able to provide mostly coherent history today and some mild speech slurring, improved per her , but not at baseline. Has had 6 days of bactrim (noting history of hallucinations on bactrim, which occurred after starting the bactrim for this episode as well)   UA   with evidence of ongoing infection.   Referred to [facility] for further evaluation     Pressure injury of skin of buttock, unspecified injury stage, unspecified laterality  Referred to [facility]S  - with bruising present, unable to clearly evaluate the depth and severity of the ulceration.     Pressure injury of skin of left heel, unspecified injury stage  Referred to ADS     CKD2  Most recent GFR 64, Cr 0.96 11/30/23         Note: Addendum with Wound Care:  Pressure ulcer stage 2 on left superior- medial sacral area. 1cm x 1cm x 0.2 cm  Pressure ulcer stage 2 on left inferior-medial sacral area 1cm x 1cm x 0.2 cm  Pressure ulcer unstageable: 3cm x 5cm   Moderate drainage.   Primary dressing: Mepilex sacral (heart shaped) bandage.   Secondary dressing: 4x4 gauze for cleansing wound with wound cleanser     Wound Care Patient Instructions   Cleanse wound with wound cleanser and pat dry with 4x4 gauze.   Cover the wound with Mepilex sacral (heart shaped) bandage. As wounds heal dressing can be reduced to 4x4 Mepilex.  Change dressing daily unless soiled.      Follow Up  Continue to do the dressing changes as ordered until your follow up appointment.   Call your provider if there is an increase in  "redness, drainage or you develop a fever.     Neli Celaya MD  [facility]    Subjective Marylee is a 68 year old, presenting for the following health issues:  UTI (Dx with UTI, was given sulfa have hx of allergy to Sulfa. Sore on buttock of left side, open sore. )       History of Present Illness       Headaches:   Since the patient's last clinic visit, headaches are: no change  The patient is getting headaches:  Four  She is able to do normal daily activities when she has a migraine.  The patient is taking the following rescue/relief medications:  Ibuprofen (Advil, Motrin)   Patient states \"I get total relief\" from the rescue/relief medications.   The patient is taking the following medications to prevent migraines:  Other  In the past 4 weeks, the patient has gone to an Urgent Care or Emergency Room 0 times times due to headaches.    She eats 0-1 servings of fruits and vegetables daily.She consumes 2 sweetened beverage(s) daily.She exercises with enough effort to increase her heart rate 9 or less minutes per day.  She exercises with enough effort to increase her heart rate 3 or less days per week. She is missing 1 dose(s) of medications per week.  She is not taking prescribed medications regularly due to other.       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       When she thinks she has a bladder infection, instead of having to come in, she has a standing order. She brings her urine in.      Around Brijesh she started having symptoms that she feels are typical for UTIs for her - confusion, slurred speech, malaise. Generally does not get dysuria. Does notice an increase in episodes of incontinence (has " incontinence at baseline). The Thursday before christmas she said she thought she might be getting a uti. - told her daughter. Her  Manuel provides some history today. She did not mention to anyone else. When she called the triage line, she did so because she had confusion and slurred speech.  Just did not feel well - had some malaise. Was going through times when she was sleeping a lot, sometimes incoherent per her . Slept most of [place]. The next day was more coherent and doing better. Did the urine sample. She got bactrim and started it, though her  notes they did not realize it was a sulfa nor remember when she started it, but she has had hallucinations on bactrim in the past.  Then she started having hallucinations.      Used to be able to get up and use the toilet, but unable to now and also notice urinary incontinence which increases in frequency with UTI.     Over the course of the past week on the antibiotic, has had intermittent hallucinations (similar to previous experience on bactrim), but has had some improvement in general malaise and in lucidity.     Has a pressure wound on her buttock which has gotten worse this past week. The area is somewhat tender. Has a nurse visiting her once weekly, but the nurse that visted last week did not look at her bottom.  Has been using an antibacterial ointment and zinc, a and d and putting a gauze pad on. She has been spending significant time in her new wheelchair and when she is in bed, she is mostly lying on her back - has not been relieving the pressure off of her wound as much as she has been encouraged to do.     She denies any fevers. There is a general trajectory of some improvement in terms of her cognition per pt and her  since she was started on antibiotics. The pressure sore is getting worse.     Where the brace was, therw was swelling and the nurse sent them to  to evaluate to see if she needed further eval for DVT.  "They did not know of the pressure would and did not check it during her UC visit.        She was treated for UTI on 12/27 with bactrim for 7 days - last day of antibiotics would have been today.      From nurse triage note from yesterday:   \"Assessment: UTI sx have improved some but not completely resolved  -still some burning and frequency  Patient reports that people around her are still saying she is less coherent, cognition still not back to 100%, confused at times during conversation  Noticing more fatigue than baseline  Patient reports that 2-3 days ago she was having visual hallucinations but this has since resolved  Patient states she is able to get adequate PO fluid intake  Sores on butt, patient feels these are pressure sores  -treating them with an OTC ointment her  is helping her apply     Protocol Recommended Disposition:   See in Office Today or Tomorrow     Disposition: Patient does sound at least slightly confused (writer does not have a baseline as have not had much previous interaction with this patient), which she endorses as well. Patient would like to  be seen for non-resolved sx, writer in agreement. Patient states she is unable to present to the clinic today, but could be seen tomorrow. Scheduled for 1/3/24 with Dr. Celaya for evaluation of sx. Patient has assistance at home from . Reviewed worsening UTI sx as well as the need to call us back with worsening confusion, hallucinations recurring, etc. The patient indicates understanding of these issues and agrees with the plan.     Routing as 2nd level triage for review by provider.     SAEID CarnesN, RN  [facility]     Reason for Disposition   Patient wants to be seen :\"    Review of Systems        Objective    /78 (BP Location: Right arm, Patient Position: Sitting, Cuff Size: Adult Regular)   Pulse 91   Temp 97.4  F (36.3  C) (Temporal)   Resp 20   LMP  (LMP Unknown)   SpO2 99%   There is " no height or weight on file to calculate BMI.  Physical Exam   GENERAL: healthy, alert and no distress  RESP: lungs clear to auscultation - no rales, rhonchi or wheezes  CV: regular rate and rhythm, normal S1 S2, no S3 or S4, no murmur, click or rub, no peripheral edema and peripheral pulses strong  SKIN: there are two ulcers on her bottom, one with surrounding bruising, mildly ttp with increased warmth. One ulcer present at the heel of her left foot.     Lab on 12/26/2023   Component Date Value Ref Range Status    Color Urine 12/26/2023 Yellow  Colorless, Straw, Light Yellow, Yellow Final    Appearance Urine 12/26/2023 Cloudy (A)  Clear Final    Glucose Urine 12/26/2023 Negative  Negative mg/dL Final    Bilirubin Urine 12/26/2023 Negative  Negative Final    Ketones Urine 12/26/2023 40 (A)  Negative mg/dL Final    Specific Gravity Urine 12/26/2023 1.015  1.003 - 1.035 Final    Blood Urine 12/26/2023 Moderate (A)  Negative Final    pH Urine 12/26/2023 5.5  5.0 - 7.0 Final    Protein Albumin Urine 12/26/2023 30 (A)  Negative mg/dL Final    Urobilinogen Urine 12/26/2023 0.2  0.2, 1.0 E.U./dL Final    Nitrite Urine 12/26/2023 Negative  Negative Final    Leukocyte Esterase Urine 12/26/2023 Large (A)  Negative Final    Bacteria Urine 12/26/2023 Moderate (A)  None Seen /HPF Final    RBC Urine 12/26/2023 10-25 (A)  0-2 /HPF /HPF Final    WBC Urine 12/26/2023 >100 (A)  0-5 /HPF /HPF Final    Squamous Epithelials Urine 12/26/2023 Few (A)  None Seen /LPF Final    WBC Clumps Urine 12/26/2023 Present (A)  None Seen /HPF Final    Budding Yeast Urine 12/26/2023 Moderate (A)  None Seen /HPF Final    Hyphal Yeast Urine 12/26/2023 Moderate (A)  None Seen /HPF Final    Culture 12/26/2023 50,000-100,000 CFU/mL Klebsiella pneumoniae (A)   Final

## 2024-01-03 NOTE — PROGRESS NOTES
Chief Complaint   Patient presents with    Urinary Problem         ICD-10-CM    1. Pyelonephritis, acute  N10 cefTRIAXone (ROCEPHIN) in lidocaine 1% (PF) for IM administration 1 g     Basic metabolic panel     CBC with platelets differential     Blood Culture Peripheral Blood     Blood Culture Peripheral Blood     Basic metabolic panel     CBC with platelets differential     cephALEXin (KEFLEX) 500 MG capsule      2. Sacral pain  M53.3 Basic metabolic panel     CBC with platelets differential     Blood Culture Peripheral Blood     Blood Culture Peripheral Blood     CT Pelvis Bone wo Contrast     Basic metabolic panel     CBC with platelets differential      3. Pressure injury of deep tissue of sacral region  L89.156 Blood Culture Peripheral Blood     Blood Culture Peripheral Blood     CT Pelvis Bone wo Contrast      4. Hyponatremia  E87.1       5. Elevated serum creatinine  R79.89       Ceftriaxone injection given here to start treatment for urinary infection and there was no allergic reaction after 25 minutes.  Rx was also sent for cephalexin to her pharmacy to begin tomorrow.  Unfortunately patient has allergies and the last urine culture was resistant to many of the typical antibiotics.  Did seem to have good response to the cephalosporins.  Blood cultures are pending.    Patient has two pressure ulcers.  On the left heel she has a very large area of deep redness going on to the bottom of the foot and the sides of the heel, in the center of this area.  Unable to obtain CT scan of the sacrum to determine the depth of the wound as requested by primary care but she was set up for outpatient CT tomorrow at 2:30 PM at [facility].  I will monitor for these results and contact the patient.  She does have a home health nurse that will be coming out to assist with wound care.    Creatinine was elevated with a low GFR.   been reports this has been abnormal previously but has returned back to normal levels.   On 11/18/2023 her creatinine was 2.11 with a GFR of 25 and it did indeed return to normal levels over the course of the next week.  She is also hyponatremic which may be contributing to some of the lethargy that  has described.  Patient is instructed to eat some high sodium foods over the next couple of days and have all labs rechecked in 1 week.    72 minutes spent by me on the date of the encounter doing chart review, history and exam, documentation and further activities per the note    Differential diagnosis includes but is not limited to urinary tract infection, pyelonephritis, urosepsis, pressure ulcers, infection of wounds    Results for orders placed or performed in visit on 01/03/24 (from the past 24 hour(s))   Basic metabolic panel   Result Value Ref Range    Sodium 126 (L) 135 - 145 mmol/L    Potassium 3.7 3.4 - 5.3 mmol/L    Chloride 87 (L) 98 - 107 mmol/L    Carbon Dioxide (CO2) 29 22 - 29 mmol/L    Anion Gap 10 7 - 15 mmol/L    Urea Nitrogen 25.2 (H) 8.0 - 23.0 mg/dL    Creatinine 1.50 (H) 0.51 - 0.95 mg/dL    GFR Estimate 38 (L) >60 mL/min/1.73m2    Calcium 9.6 8.8 - 10.2 mg/dL    Glucose 90 70 - 99 mg/dL   CBC with platelets differential    Narrative    The following orders were created for panel order CBC with platelets differential.  Procedure                               Abnormality         Status                     ---------                               -----------         ------                     CBC with platelets and d...[[id number]]  Abnormal            Final result                 Please view results for these tests on the individual orders.   CBC with platelets and differential   Result Value Ref Range    WBC Count 6.2 4.0 - 11.0 10e3/uL    RBC Count 3.53 (L) 3.80 - 5.20 10e6/uL    Hemoglobin 10.9 (L) 11.7 - 15.7 g/dL    Hematocrit 32.0 (L) 35.0 - 47.0 %    MCV 91 78 - 100 fL    MCH 30.9 26.5 - 33.0 pg    MCHC 34.1 31.5 - 36.5 g/dL    RDW 14.9 10.0 - 15.0 %    Platelet Count 267  "150 - 450 10e3/uL    % Neutrophils 72 %    % Lymphocytes 19 %    % Monocytes 5 %    % Eosinophils 3 %    % Basophils 1 %    % Immature Granulocytes 0 %    Absolute Neutrophils 4.5 1.6 - 8.3 10e3/uL    Absolute Lymphocytes 1.2 0.8 - 5.3 10e3/uL    Absolute Monocytes 0.3 0.0 - 1.3 10e3/uL    Absolute Eosinophils 0.2 0.0 - 0.7 10e3/uL    Absolute Basophils 0.1 0.0 - 0.2 10e3/uL    Absolute Immature Granulocytes 0.0 <=0.4 10e3/uL     *Note: Due to a large number of results and/or encounters for the requested time period, some results have not been displayed. A complete set of results can be found in Results Review.       Subjective Marylee is a 68 year old, presenting for the following health issues:  No chief complaint on file.  HPI     Skin Lesion  Onset/Duration: 12/27  Description  Location: left butt cheek and left heel  Color: brown and \"part of the skin is opened\" on buttocks and pink on left heel  Border description: irregular border, irregularly pigmented, pearly edges on buttocks  Flaky yellow tinted skin with pink dot in the middle of wound edges are raised on heel  Character: round, blotchy on buttocks  Round pink and flaky dry skin on heel  Itching: no  Bleeding:  No  Intensity:  moderate  Progression of Symptoms:  worsening  Accompanying signs and symptoms:   Bleeding: No  Scaling: YES on both sites  Excessive sun exposure/tanning: No  Sunscreen used: No  History:           Any previous history of skin cancer: No  Any family history of melanoma: No  Previous episodes of similar lesion: No  Precipitating or alleviating factors: sitting for long periods of time  Therapies tried and outcome: zincoxyde, A&D cream, topical OTC antibiotic    Genitourinary - Female  Onset/Duration: 10 days  Description:   Painful urination (Dysuria): No           Frequency: YES  Blood in urine (Hematuria): No  Delay in urine (Hesitency): No  Intensity: mild  Progression of Symptoms:  improving  Accompanying Signs & " "Symptoms:  Fever/chills: No  Flank pain: No  Nausea and vomiting: YES  Vaginal symptoms: none  Abdominal/Pelvic Pain: No  History:   History of frequent UTI s: YES  History of kidney stones: No  Sexually Active: No  Possibility of pregnancy: No  Precipitating or alleviating factors: None  Therapies tried and outcome: Cranberry juice prn (contraindicated in Coumadin patients) and Increase fluid intake      ROS: 10 point ROS neg other than the symptoms noted above in the HPI.       Objective    /73 (BP Location: Right arm, Patient Position: Sitting, Cuff Size: Adult Regular)   Pulse 81   Temp 97.5  F (36.4  C) (Oral)   Resp 13   Ht 1.727 m (5' 8\")   LMP  (LMP Unknown)   SpO2 98%   BMI 20.98 kg/m    Nurses notes and VS have been reviewed.    Physical Exam   Physical Exam  HENT:      Head: Normocephalic.      Mouth/Throat:      Mouth: Mucous membranes are moist.   Eyes:      Pupils: Pupils are equal, round, and reactive to light.   Cardiovascular:      Rate and Rhythm: Normal rate and regular rhythm.   Pulmonary:      Effort: Pulmonary effort is normal.      Breath sounds: Normal breath sounds.   Abdominal:      General: Abdomen is flat.      Palpations: Abdomen is soft.   Musculoskeletal:      Cervical back: Normal range of motion.      Comments:   Patient has a full leg cast on the right side for a femur fracture she sustained, there is a splint patient has been wearing on the left lower extremity that is applying pressure directly onto her heel, legs are both very weak with diminished sensation.    Skin:            Comments: Sacral area has dark purple discoloration with some deep redness around the area, area measures 5 inches x 3 inches    Left heel has deep red discoloration with a central area of skin scaling   Neurological:      Mental Status: She is alert.      Motor: Weakness present.      Gait: Gait abnormal.      Comments: Wheelchair-bound     Psychiatric:         Mood and Affect: Mood normal. "            SWAPNIL Nicholas, CNP  [facility]    The use of Dragon/[employer] dictation services may have been used to construct the content in this note; any grammatical or spelling errors are non-intentional. Please contact the author of this note directly if you are in need of any clarification.

## 2024-01-04 ENCOUNTER — HOSPITAL ENCOUNTER (OUTPATIENT)
Dept: CT IMAGING | Facility: CLINIC | Age: 69
Discharge: HOME | DRG: 644 | End: 2024-01-04
Attending: NURSE PRACTITIONER | Admitting: NURSE PRACTITIONER
Payer: MEDICARE

## 2024-01-04 DIAGNOSIS — M53.3 SACRAL PAIN: ICD-10-CM

## 2024-01-04 DIAGNOSIS — L89.156 PRESSURE INJURY OF DEEP TISSUE OF SACRAL REGION: ICD-10-CM

## 2024-01-04 DIAGNOSIS — E89.0 POSTABLATIVE HYPOTHYROIDISM: ICD-10-CM

## 2024-01-04 LAB — BACTERIA UR CULT: NORMAL

## 2024-01-04 PROCEDURE — 72192 CT PELVIS W/O DYE: CPT | Mod: MG

## 2024-01-04 RX ORDER — LEVOTHYROXINE SODIUM 100 UG/1
TABLET ORAL
Qty: 30 TABLET | Refills: 0 | Status: SHIPPED | OUTPATIENT
Start: 2024-01-04 | End: 2024-02-14

## 2024-01-04 NOTE — TELEPHONE ENCOUNTER
Thank you Beatriz!  I signed orders, and addended Dr. Celaya' last note.  Let me know if there is anything else I need to do!  Also - thank you! Thank you!  Dr. Carolina Pulliam MD / [facility]

## 2024-01-04 NOTE — TELEPHONE ENCOUNTER
Writer called [facility] to let them know that the order was sent over. Writer spoke with Fabienne.    Writer called  of patient to let him know that wound care order for supplies was sent in. Medicare will not pay for wound cleanser or the lotion.   Referral for the MAPPING of the wheelchair was also sent in by Dr. Pulliam. You should be contacted within two business days to schedule an appointment.     SAEID DoradoN HEIDY  [facility]

## 2024-01-05 ENCOUNTER — OFFICE VISIT (OUTPATIENT)
Dept: FAMILY MEDICINE | Facility: CLINIC | Age: 69
End: 2024-01-05
Payer: MEDICARE

## 2024-01-05 ENCOUNTER — HOSPITAL ENCOUNTER (INPATIENT)
Facility: CLINIC | Age: 69
LOS: 14 days | Discharge: HOME | DRG: 644 | End: 2024-01-20
Attending: EMERGENCY MEDICINE | Admitting: STUDENT IN AN ORGANIZED HEALTH CARE EDUCATION/TRAINING PROGRAM
Payer: MEDICARE

## 2024-01-05 ENCOUNTER — TELEPHONE (OUTPATIENT)
Dept: FAMILY MEDICINE | Facility: CLINIC | Age: 69
End: 2024-01-05

## 2024-01-05 ENCOUNTER — NURSE TRIAGE (OUTPATIENT)
Dept: NURSING | Facility: CLINIC | Age: 69
End: 2024-01-05

## 2024-01-05 VITALS
OXYGEN SATURATION: 96 % | DIASTOLIC BLOOD PRESSURE: 60 MMHG | RESPIRATION RATE: 16 BRPM | HEART RATE: 94 BPM | TEMPERATURE: 97.4 F | SYSTOLIC BLOOD PRESSURE: 112 MMHG

## 2024-01-05 DIAGNOSIS — G35 MS (MULTIPLE SCLEROSIS) (H): ICD-10-CM

## 2024-01-05 DIAGNOSIS — S72.491A OTHER CLOSED FRACTURE OF DISTAL END OF RIGHT FEMUR, INITIAL ENCOUNTER (H): ICD-10-CM

## 2024-01-05 DIAGNOSIS — N18.31 STAGE 3A CHRONIC KIDNEY DISEASE (H): ICD-10-CM

## 2024-01-05 DIAGNOSIS — R41.89 ALTERATION IN COGNITION: ICD-10-CM

## 2024-01-05 DIAGNOSIS — N17.9 AKI (ACUTE KIDNEY INJURY) (H): ICD-10-CM

## 2024-01-05 DIAGNOSIS — N30.01 ACUTE CYSTITIS WITH HEMATURIA: ICD-10-CM

## 2024-01-05 DIAGNOSIS — L89.222 PRESSURE INJURY OF LEFT HIP, STAGE 2 (H): ICD-10-CM

## 2024-01-05 DIAGNOSIS — B37.49 CANDIDURIA: ICD-10-CM

## 2024-01-05 DIAGNOSIS — E87.1 HYPONATREMIA: Primary | ICD-10-CM

## 2024-01-05 DIAGNOSIS — L89.156 PRESSURE INJURY OF DEEP TISSUE OF SACRAL REGION: ICD-10-CM

## 2024-01-05 DIAGNOSIS — L89.153 PRESSURE INJURY OF SACRAL REGION, STAGE 3 (H): ICD-10-CM

## 2024-01-05 DIAGNOSIS — E87.1 HYPONATREMIA: ICD-10-CM

## 2024-01-05 DIAGNOSIS — L89.226 PRESSURE INJURY OF DEEP TISSUE OF LEFT HIP: Primary | ICD-10-CM

## 2024-01-05 DIAGNOSIS — L89.622 PRESSURE INJURY OF LEFT HEEL, STAGE 2 (H): ICD-10-CM

## 2024-01-05 LAB
ANION GAP SERPL CALCULATED.3IONS-SCNC: 10 MMOL/L (ref 7–15)
BASOPHILS # BLD AUTO: 0 10E3/UL (ref 0–0.2)
BASOPHILS NFR BLD AUTO: 1 %
BUN SERPL-MCNC: 21.9 MG/DL (ref 8–23)
CALCIUM SERPL-MCNC: 9.4 MG/DL (ref 8.8–10.2)
CHLORIDE SERPL-SCNC: 85 MMOL/L (ref 98–107)
CREAT SERPL-MCNC: 0.84 MG/DL (ref 0.51–0.95)
DEPRECATED HCO3 PLAS-SCNC: 24 MMOL/L (ref 22–29)
EGFRCR SERPLBLD CKD-EPI 2021: 75 ML/MIN/1.73M2
EOSINOPHIL # BLD AUTO: 0.2 10E3/UL (ref 0–0.7)
EOSINOPHIL NFR BLD AUTO: 3 %
ERYTHROCYTE [DISTWIDTH] IN BLOOD BY AUTOMATED COUNT: 15.1 % (ref 10–15)
ERYTHROCYTE [DISTWIDTH] IN BLOOD BY AUTOMATED COUNT: 15.2 % (ref 10–15)
GLUCOSE SERPL-MCNC: 95 MG/DL (ref 70–99)
HCT VFR BLD AUTO: 30 % (ref 35–47)
HCT VFR BLD AUTO: 33 % (ref 35–47)
HGB BLD-MCNC: 10.2 G/DL (ref 11.7–15.7)
HGB BLD-MCNC: 11.3 G/DL (ref 11.7–15.7)
IMM GRANULOCYTES # BLD: 0 10E3/UL
IMM GRANULOCYTES NFR BLD: 0 %
LYMPHOCYTES # BLD AUTO: 0.9 10E3/UL (ref 0.8–5.3)
LYMPHOCYTES NFR BLD AUTO: 12 %
MCH RBC QN AUTO: 30.8 PG (ref 26.5–33)
MCH RBC QN AUTO: 30.9 PG (ref 26.5–33)
MCHC RBC AUTO-ENTMCNC: 34 G/DL (ref 31.5–36.5)
MCHC RBC AUTO-ENTMCNC: 34.2 G/DL (ref 31.5–36.5)
MCV RBC AUTO: 90 FL (ref 78–100)
MCV RBC AUTO: 91 FL (ref 78–100)
MONOCYTES # BLD AUTO: 0.4 10E3/UL (ref 0–1.3)
MONOCYTES NFR BLD AUTO: 6 %
NEUTROPHILS # BLD AUTO: 5.7 10E3/UL (ref 1.6–8.3)
NEUTROPHILS NFR BLD AUTO: 78 %
NRBC # BLD AUTO: 0 10E3/UL
NRBC BLD AUTO-RTO: 0 /100
OSMOLALITY SERPL: 262 MMOL/KG (ref 280–301)
PLATELET # BLD AUTO: 248 10E3/UL (ref 150–450)
PLATELET # BLD AUTO: 264 10E3/UL (ref 150–450)
POTASSIUM SERPL-SCNC: 4.3 MMOL/L (ref 3.4–5.3)
RBC # BLD AUTO: 3.31 10E6/UL (ref 3.8–5.2)
RBC # BLD AUTO: 3.66 10E6/UL (ref 3.8–5.2)
SODIUM SERPL-SCNC: 119 MMOL/L (ref 135–145)
WBC # BLD AUTO: 7.2 10E3/UL (ref 4–11)
WBC # BLD AUTO: 8.6 10E3/UL (ref 4–11)

## 2024-01-05 PROCEDURE — 36415 COLL VENOUS BLD VENIPUNCTURE: CPT | Performed by: NURSE PRACTITIONER

## 2024-01-05 PROCEDURE — 84300 ASSAY OF URINE SODIUM: CPT

## 2024-01-05 PROCEDURE — 96360 HYDRATION IV INFUSION INIT: CPT | Performed by: EMERGENCY MEDICINE

## 2024-01-05 PROCEDURE — 99291 CRITICAL CARE FIRST HOUR: CPT | Mod: 25 | Performed by: EMERGENCY MEDICINE

## 2024-01-05 PROCEDURE — 84295 ASSAY OF SERUM SODIUM: CPT | Performed by: EMERGENCY MEDICINE

## 2024-01-05 PROCEDURE — 85025 COMPLETE CBC W/AUTO DIFF WBC: CPT | Performed by: NURSE PRACTITIONER

## 2024-01-05 PROCEDURE — 36415 COLL VENOUS BLD VENIPUNCTURE: CPT | Performed by: EMERGENCY MEDICINE

## 2024-01-05 PROCEDURE — 93005 ELECTROCARDIOGRAM TRACING: CPT | Performed by: EMERGENCY MEDICINE

## 2024-01-05 PROCEDURE — 83735 ASSAY OF MAGNESIUM: CPT | Performed by: INTERNAL MEDICINE

## 2024-01-05 PROCEDURE — 83935 ASSAY OF URINE OSMOLALITY: CPT | Performed by: EMERGENCY MEDICINE

## 2024-01-05 PROCEDURE — 87106 FUNGI IDENTIFICATION YEAST: CPT | Performed by: EMERGENCY MEDICINE

## 2024-01-05 PROCEDURE — 84439 ASSAY OF FREE THYROXINE: CPT | Performed by: INTERNAL MEDICINE

## 2024-01-05 PROCEDURE — 99417 PROLNG OP E/M EACH 15 MIN: CPT | Performed by: NURSE PRACTITIONER

## 2024-01-05 PROCEDURE — 99215 OFFICE O/P EST HI 40 MIN: CPT | Performed by: NURSE PRACTITIONER

## 2024-01-05 PROCEDURE — 80069 RENAL FUNCTION PANEL: CPT | Performed by: NURSE PRACTITIONER

## 2024-01-05 PROCEDURE — 83930 ASSAY OF BLOOD OSMOLALITY: CPT | Performed by: EMERGENCY MEDICINE

## 2024-01-05 PROCEDURE — 81001 URINALYSIS AUTO W/SCOPE: CPT | Performed by: INTERNAL MEDICINE

## 2024-01-05 PROCEDURE — 85027 COMPLETE CBC AUTOMATED: CPT | Performed by: EMERGENCY MEDICINE

## 2024-01-05 PROCEDURE — 84100 ASSAY OF PHOSPHORUS: CPT | Performed by: INTERNAL MEDICINE

## 2024-01-05 PROCEDURE — 81001 URINALYSIS AUTO W/SCOPE: CPT | Performed by: EMERGENCY MEDICINE

## 2024-01-05 PROCEDURE — 84443 ASSAY THYROID STIM HORMONE: CPT | Performed by: EMERGENCY MEDICINE

## 2024-01-05 PROCEDURE — 85027 COMPLETE CBC AUTOMATED: CPT | Mod: 59 | Performed by: NURSE PRACTITIONER

## 2024-01-05 PROCEDURE — 84300 ASSAY OF URINE SODIUM: CPT | Performed by: INTERNAL MEDICINE

## 2024-01-05 PROCEDURE — 93010 ELECTROCARDIOGRAM REPORT: CPT | Performed by: EMERGENCY MEDICINE

## 2024-01-05 PROCEDURE — 258N000003 HC RX IP 258 OP 636: Performed by: EMERGENCY MEDICINE

## 2024-01-05 RX ORDER — SODIUM CHLORIDE 9 MG/ML
INJECTION, SOLUTION INTRAVENOUS CONTINUOUS
Status: DISCONTINUED | OUTPATIENT
Start: 2024-01-05 | End: 2024-01-08

## 2024-01-05 RX ORDER — RESPIRATORY SYNCYTIAL VIRUS VACCINE 120MCG/0.5
0.5 KIT INTRAMUSCULAR ONCE
Qty: 1 EACH | Refills: 0 | Status: CANCELLED | OUTPATIENT
Start: 2024-01-05 | End: 2024-01-05

## 2024-01-05 RX ADMIN — SODIUM CHLORIDE 1000 ML: 9 INJECTION, SOLUTION INTRAVENOUS at 23:42

## 2024-01-05 RX ADMIN — SODIUM CHLORIDE: 9 INJECTION, SOLUTION INTRAVENOUS at 23:56

## 2024-01-05 ASSESSMENT — ACTIVITIES OF DAILY LIVING (ADL): ADLS_ACUITY_SCORE: 39

## 2024-01-05 ASSESSMENT — PAIN SCALES - GENERAL: PAINLEVEL: MODERATE PAIN (4)

## 2024-01-05 NOTE — Clinical Note
Hello, can we communicate the wound care instructions to home health.  I would also like them to increase frequency of visits to twice per week.  I ordered an airflow mattress.  Patient should also be participating in PT as well as OT.  Would have OT consider brace for left hand.

## 2024-01-05 NOTE — PROGRESS NOTES
Assessment & Plan     Hyponatremia  Secondary to poor oral intake.  Will recheck BMP today.  Patient has been drinking plenty of water and I encouraged her to switch to Gatorade.  Hyponatremia is complicated by DENNISE on CKD.  She does need fluid and fortunately today her mentation is improved.  Recommended she also liberalize sodium intake.  We could consider addition of sodium tablets for short-term therapy if needed.  Discussed if her sodium has dropped significantly we may want to consider referral for IV fluid.  At this time and will be we can manage in the outpatient setting.  As long as her sodium results are noncritical we will continue to promote oral replacement.  - Basic metabolic panel  (Ca, Cl, CO2, Creat, Gluc, K, Na, BUN); Future  - Basic metabolic panel  (Ca, Cl, CO2, Creat, Gluc, K, Na, BUN)    Stage 3a chronic kidney disease (H)  DENNISE (acute kidney injury) (H24)  Last creatinine was 1.5, baseline is less than 1.  Patient is been increasing oral hydration.  Rechecking BMP and suspecting this will have resolved.  - Basic metabolic panel  (Ca, Cl, CO2, Creat, Gluc, K, Na, BUN); Future  - Basic metabolic panel  (Ca, Cl, CO2, Creat, Gluc, K, Na, BUN)    Acute cystitis with hematuria  Continues on antibiotics, tolerating Keflex without significant side effects.  - CBC with platelets; Future  - CBC with platelets    Alteration in cognition  2/2 to recent sulfa reaction, patient has history of altered cognition with this medication.  Current and oriented x 3 today appears her mentation has improved.  - CBC with platelets; Future  - CBC with platelets    Pressure injury of sacral region, stage 3 (H)  Patient is receiving home care services, unfortunately they have not received Mepilex dressings.  Wounds inspected today and photos updated in her chart.  Strongly recommended frequent position changes for offloading.   reports they have multiple positioning aids at home.  Recommending while she is in her  wheelchair they offload each hip at least every 2 hours.  Would also avoid prolonged sitting in her wheelchair suspect this is contributing.  Patient does have a hospital bed at home and I am ordering an air mattress through DME.  Recommend increasing frequency of home health visits to at least 2 times weekly.  May benefit from home health aid as well.  Discussed increasing protein consumption for wound healing.  Recommend ensure between meals.     Recommended wound care:  -Cleanse wound daily with saline or wound spray  -Pat dry with guaze  -Apply mepilex (heart dressing) to coccyx, ok to reuse dressing x 7 days, change if soiled.  -Apply Aquaphor or other moisture barrier to the surrounding skin/buttocks.  - Miscellaneous Order for DME - ONLY FOR DME    Pressure injury of left heel, stage 2 (H)  Recommended offloading kim boot, especially while in chair.  Offload with heel suspended while in bed or W/C.   reports they have boot at home and other positioning aids.  Per above, patient would benefit from air mattress at home.  Wound care:  -cleanse wound daily with saline or wound spray  -pat dry with guaze  -Apply mepilex dressing to affected areas.  - Miscellaneous Order for DME - ONLY FOR DME    Pressure injury of left hip, stage 2 (H)  Same wound care as above, frequent repositioning and offloading recommended.    - Miscellaneous Order for DME - ONLY FOR DME      65 minutes spent by me on the date of the encounter doing chart review, history and exam, documentation and further activities per the note           SWAPNIL [facility]    Subjective Marylee is a 68 year old, presenting for the following health issues:  RECHECK (Lab work/)      1/5/2024    10:28 AM   Additional Questions   Roomed by Cortney MEDINA     Yesterday was very lethargic/somnolent, difficult to arrouse.  Confused when she did wake up, slurred speech.  Speech is more slurred now.  Left  hand is weaker- this is a recent development in the last week.  Will have occasional days like this  Sleeps all night and will be much more coherent    Was recently given sulfa drugs- has a bad history of lab results.  Did not resolve the UTI.    Now on Keflex.    When sore first started, RNs were going to order mepilex.  Now has been 2 weeks.  Feels like they are getting multiple stories.  Still haven't received home dressings.    Feeling frustrated that she has not been getting PT therapies.  Has OT therapy once, not scheduled to have a visit     She has hospital bed at home but not on an air mattress.  They do have different pads and boots available as well.    Was not eating much this week.  She had 2 ensure drinks yesterday, trying to promote water.  They have gatorade, but she was too lethargic to drink it.        Review of Systems   Constitutional, HEENT, cardiovascular, pulmonary, gi and gu systems are negative, except as otherwise noted.      Objective    Pulse 94   Temp 97.4  F (36.3  C) (Temporal)   Resp 16   LMP  (LMP Unknown)   SpO2 96%   There is no height or weight on file to calculate BMI.  Physical Exam   GENERAL: healthy, alert and no distress  CV: regular rate and rhythm, normal S1 S2, no S3 or S4, no murmur, click or rub, no peripheral edema and peripheral pulses strong  ABDOMEN: soft, nontender, no hepatosplenomegaly, no masses and bowel sounds normal  MS: no gross musculoskeletal defects noted, no edema  NEURO: A&Ox 3 today  SKIN: Multiple stage 2 pressure ulcers.    Coccyx:      Left heel:      Hip:

## 2024-01-05 NOTE — TELEPHONE ENCOUNTER
"Home Care nurse calling to report update prior to patient's appointment today.     states she has had altered mental status past week.  [Is on antibiotics for UTI] States that she has eaten nothing in past 2 days, but drinking some fluids. Weight loss of 10# in past week per home care. Home care reports absence of bowel sounds today.  When queried, home care nurse denies that patient shows signs of pain.  When queried, home care nurse states no concerns for safety outside of weight loss.    Home care nurse has several areas of concern:    Low back \"protrusion\" that is hard and swollen. No bruising noted  Left hip area that is hard and swollen with deep tissue injury.   Coccyx area with Stage 3 wound.    Patient did fall last week per home care.    Unable to be weighted at home due to mobility limitations so requesting if clinic can get weight during visit today.     Also requesting increase in frequency of skilled nursing visits to 2 x weekly.    Myriam Gonzalez, RN, BSN, PHN  [facility]  [phone]      "

## 2024-01-06 PROBLEM — E87.1 HYPONATREMIA: Status: ACTIVE | Noted: 2024-01-06

## 2024-01-06 LAB
ALBUMIN SERPL BCG-MCNC: 3.6 G/DL (ref 3.5–5.2)
ALBUMIN UR-MCNC: 30 MG/DL
ALBUMIN UR-MCNC: 30 MG/DL
ALP SERPL-CCNC: 108 U/L (ref 40–150)
ALT SERPL W P-5'-P-CCNC: 17 U/L (ref 0–50)
ANION GAP SERPL CALCULATED.3IONS-SCNC: 10 MMOL/L (ref 7–15)
ANION GAP SERPL CALCULATED.3IONS-SCNC: 14 MMOL/L (ref 7–15)
APPEARANCE UR: ABNORMAL
APPEARANCE UR: ABNORMAL
AST SERPL W P-5'-P-CCNC: 36 U/L (ref 0–45)
ATRIAL RATE - MUSE: 79 BPM
BILIRUB SERPL-MCNC: 0.2 MG/DL
BILIRUB UR QL STRIP: NEGATIVE
BILIRUB UR QL STRIP: NEGATIVE
BUN SERPL-MCNC: 20 MG/DL (ref 8–23)
BUN SERPL-MCNC: 24.3 MG/DL (ref 8–23)
CALCIUM SERPL-MCNC: 9.1 MG/DL (ref 8.8–10.2)
CALCIUM SERPL-MCNC: 9.2 MG/DL (ref 8.8–10.2)
CHLORIDE SERPL-SCNC: 84 MMOL/L (ref 98–107)
CHLORIDE SERPL-SCNC: 88 MMOL/L (ref 98–107)
COLOR UR AUTO: ABNORMAL
COLOR UR AUTO: ABNORMAL
CORTIS SERPL-MCNC: 12.2 UG/DL
CREAT SERPL-MCNC: 0.84 MG/DL (ref 0.51–0.95)
CREAT SERPL-MCNC: 1.14 MG/DL (ref 0.51–0.95)
DEPRECATED HCO3 PLAS-SCNC: 21 MMOL/L (ref 22–29)
DEPRECATED HCO3 PLAS-SCNC: 24 MMOL/L (ref 22–29)
DIASTOLIC BLOOD PRESSURE - MUSE: NORMAL MMHG
EGFRCR SERPLBLD CKD-EPI 2021: 52 ML/MIN/1.73M2
EGFRCR SERPLBLD CKD-EPI 2021: 75 ML/MIN/1.73M2
ERYTHROCYTE [DISTWIDTH] IN BLOOD BY AUTOMATED COUNT: 14.8 % (ref 10–15)
GLUCOSE SERPL-MCNC: 107 MG/DL (ref 70–99)
GLUCOSE SERPL-MCNC: 95 MG/DL (ref 70–99)
GLUCOSE UR STRIP-MCNC: NEGATIVE MG/DL
GLUCOSE UR STRIP-MCNC: NEGATIVE MG/DL
HCT VFR BLD AUTO: 26.9 % (ref 35–47)
HGB BLD-MCNC: 9 G/DL (ref 11.7–15.7)
HGB UR QL STRIP: ABNORMAL
HGB UR QL STRIP: ABNORMAL
HOLD SPECIMEN: NORMAL
INTERPRETATION ECG - MUSE: NORMAL
KETONES UR STRIP-MCNC: NEGATIVE MG/DL
KETONES UR STRIP-MCNC: NEGATIVE MG/DL
LACTATE SERPL-SCNC: 1 MMOL/L (ref 0.7–2)
LACTATE SERPL-SCNC: 2.5 MMOL/L (ref 0.7–2)
LEUKOCYTE ESTERASE UR QL STRIP: ABNORMAL
LEUKOCYTE ESTERASE UR QL STRIP: ABNORMAL
MAGNESIUM SERPL-MCNC: 1.6 MG/DL (ref 1.7–2.3)
MCH RBC QN AUTO: 31.6 PG (ref 26.5–33)
MCHC RBC AUTO-ENTMCNC: 33.5 G/DL (ref 31.5–36.5)
MCV RBC AUTO: 94 FL (ref 78–100)
MRSA DNA SPEC QL NAA+PROBE: NEGATIVE
NITRATE UR QL: NEGATIVE
NITRATE UR QL: NEGATIVE
OSMOLALITY SERPL: 266 MMOL/KG (ref 280–301)
OSMOLALITY UR: 353 MMOL/KG (ref 100–1200)
P AXIS - MUSE: 75 DEGREES
PH UR STRIP: 5.5 [PH] (ref 5–7)
PH UR STRIP: 5.5 [PH] (ref 5–7)
PHOSPHATE SERPL-MCNC: 3.1 MG/DL (ref 2.5–4.5)
PLATELET # BLD AUTO: 199 10E3/UL (ref 150–450)
POTASSIUM SERPL-SCNC: 3.5 MMOL/L (ref 3.4–5.3)
POTASSIUM SERPL-SCNC: 4.3 MMOL/L (ref 3.4–5.3)
PR INTERVAL - MUSE: 166 MS
PROT SERPL-MCNC: 6.9 G/DL (ref 6.4–8.3)
QRS DURATION - MUSE: 90 MS
QT - MUSE: 388 MS
QTC - MUSE: 444 MS
R AXIS - MUSE: 17 DEGREES
RBC # BLD AUTO: 2.85 10E6/UL (ref 3.8–5.2)
RBC URINE: 20 /HPF
RBC URINE: 20 /HPF
SA TARGET DNA: POSITIVE
SODIUM SERPL-SCNC: 118 MMOL/L (ref 135–145)
SODIUM SERPL-SCNC: 120 MMOL/L (ref 135–145)
SODIUM SERPL-SCNC: 123 MMOL/L (ref 135–145)
SODIUM SERPL-SCNC: 124 MMOL/L (ref 135–145)
SODIUM UR-SCNC: 46 MMOL/L
SODIUM UR-SCNC: 48 MMOL/L
SP GR UR STRIP: 1.02 (ref 1–1.03)
SP GR UR STRIP: 1.02 (ref 1–1.03)
SQUAMOUS EPITHELIAL: 9 /HPF
SQUAMOUS EPITHELIAL: 9 /HPF
SYSTOLIC BLOOD PRESSURE - MUSE: NORMAL MMHG
T AXIS - MUSE: 56 DEGREES
T4 FREE SERPL-MCNC: 1.09 NG/DL (ref 0.9–1.7)
TSH SERPL DL<=0.005 MIU/L-ACNC: 52.6 UIU/ML (ref 0.3–4.2)
UROBILINOGEN UR STRIP-MCNC: NORMAL MG/DL
UROBILINOGEN UR STRIP-MCNC: NORMAL MG/DL
VENTRICULAR RATE- MUSE: 79 BPM
WBC # BLD AUTO: 6 10E3/UL (ref 4–11)
WBC CLUMPS #/AREA URNS HPF: PRESENT /HPF
WBC CLUMPS #/AREA URNS HPF: PRESENT /HPF
WBC URINE: >182 /HPF
WBC URINE: >182 /HPF
YEAST #/AREA URNS HPF: ABNORMAL /HPF
YEAST #/AREA URNS HPF: ABNORMAL /HPF

## 2024-01-06 PROCEDURE — 250N000011 HC RX IP 250 OP 636: Mod: JZ | Performed by: EMERGENCY MEDICINE

## 2024-01-06 PROCEDURE — 87641 MR-STAPH DNA AMP PROBE: CPT

## 2024-01-06 PROCEDURE — 84295 ASSAY OF SERUM SODIUM: CPT | Performed by: INTERNAL MEDICINE

## 2024-01-06 PROCEDURE — 99207 PR NO BILLABLE SERVICE THIS VISIT: CPT | Performed by: STUDENT IN AN ORGANIZED HEALTH CARE EDUCATION/TRAINING PROGRAM

## 2024-01-06 PROCEDURE — 999N000147 HC STATISTIC PT IP EVAL DEFER

## 2024-01-06 PROCEDURE — 250N000011 HC RX IP 250 OP 636

## 2024-01-06 PROCEDURE — 99223 1ST HOSP IP/OBS HIGH 75: CPT | Mod: AI | Performed by: INTERNAL MEDICINE

## 2024-01-06 PROCEDURE — 84295 ASSAY OF SERUM SODIUM: CPT

## 2024-01-06 PROCEDURE — 250N000013 HC RX MED GY IP 250 OP 250 PS 637: Performed by: STUDENT IN AN ORGANIZED HEALTH CARE EDUCATION/TRAINING PROGRAM

## 2024-01-06 PROCEDURE — 258N000003 HC RX IP 258 OP 636: Performed by: INTERNAL MEDICINE

## 2024-01-06 PROCEDURE — 250N000013 HC RX MED GY IP 250 OP 250 PS 637: Performed by: INTERNAL MEDICINE

## 2024-01-06 PROCEDURE — 83930 ASSAY OF BLOOD OSMOLALITY: CPT | Performed by: INTERNAL MEDICINE

## 2024-01-06 PROCEDURE — 85027 COMPLETE CBC AUTOMATED: CPT

## 2024-01-06 PROCEDURE — 82533 TOTAL CORTISOL: CPT

## 2024-01-06 PROCEDURE — 36415 COLL VENOUS BLD VENIPUNCTURE: CPT | Performed by: INTERNAL MEDICINE

## 2024-01-06 PROCEDURE — 36415 COLL VENOUS BLD VENIPUNCTURE: CPT

## 2024-01-06 PROCEDURE — 120N000002 HC R&B MED SURG/OB UMMC

## 2024-01-06 PROCEDURE — 250N000013 HC RX MED GY IP 250 OP 250 PS 637

## 2024-01-06 PROCEDURE — 83605 ASSAY OF LACTIC ACID: CPT | Performed by: INTERNAL MEDICINE

## 2024-01-06 PROCEDURE — 80048 BASIC METABOLIC PNL TOTAL CA: CPT | Performed by: EMERGENCY MEDICINE

## 2024-01-06 RX ORDER — BACLOFEN 10 MG/1
10 TABLET ORAL
Status: DISCONTINUED | OUTPATIENT
Start: 2024-01-06 | End: 2024-01-06

## 2024-01-06 RX ORDER — CHOLESTYRAMINE 4 G/9G
1 POWDER, FOR SUSPENSION ORAL 2 TIMES DAILY
Status: DISCONTINUED | OUTPATIENT
Start: 2024-01-06 | End: 2024-01-06

## 2024-01-06 RX ORDER — ACETAMINOPHEN 325 MG/1
975 TABLET ORAL EVERY 8 HOURS
Status: DISCONTINUED | OUTPATIENT
Start: 2024-01-06 | End: 2024-01-20 | Stop reason: HOSPADM

## 2024-01-06 RX ORDER — CALCIUM CARBONATE 500 MG/1
1000 TABLET, CHEWABLE ORAL 4 TIMES DAILY PRN
Status: DISCONTINUED | OUTPATIENT
Start: 2024-01-06 | End: 2024-01-20 | Stop reason: HOSPADM

## 2024-01-06 RX ORDER — BACLOFEN 20 MG/1
20 TABLET ORAL EVERY MORNING
Status: DISCONTINUED | OUTPATIENT
Start: 2024-01-06 | End: 2024-01-20 | Stop reason: HOSPADM

## 2024-01-06 RX ORDER — OXYBUTYNIN CHLORIDE 10 MG/1
10 TABLET, EXTENDED RELEASE ORAL
Status: DISCONTINUED | OUTPATIENT
Start: 2024-01-06 | End: 2024-01-06

## 2024-01-06 RX ORDER — FAMOTIDINE 20 MG/1
40 TABLET, FILM COATED ORAL AT BEDTIME
Status: DISCONTINUED | OUTPATIENT
Start: 2024-01-06 | End: 2024-01-06

## 2024-01-06 RX ORDER — CEFTRIAXONE 1 G/1
1 INJECTION, POWDER, FOR SOLUTION INTRAMUSCULAR; INTRAVENOUS EVERY 24 HOURS
Status: DISCONTINUED | OUTPATIENT
Start: 2024-01-06 | End: 2024-01-09

## 2024-01-06 RX ORDER — LIDOCAINE 40 MG/G
CREAM TOPICAL
Status: DISCONTINUED | OUTPATIENT
Start: 2024-01-06 | End: 2024-01-20 | Stop reason: HOSPADM

## 2024-01-06 RX ORDER — OXYCODONE HYDROCHLORIDE 5 MG/1
5 TABLET ORAL EVERY 4 HOURS PRN
Status: DISCONTINUED | OUTPATIENT
Start: 2024-01-06 | End: 2024-01-20 | Stop reason: HOSPADM

## 2024-01-06 RX ORDER — DULOXETIN HYDROCHLORIDE 20 MG/1
20 CAPSULE, DELAYED RELEASE ORAL DAILY
Status: DISCONTINUED | OUTPATIENT
Start: 2024-01-06 | End: 2024-01-06

## 2024-01-06 RX ORDER — GABAPENTIN 300 MG/1
300 CAPSULE ORAL 2 TIMES DAILY
Status: DISCONTINUED | OUTPATIENT
Start: 2024-01-06 | End: 2024-01-18

## 2024-01-06 RX ORDER — LEVOTHYROXINE SODIUM 100 UG/1
100 TABLET ORAL
Qty: 6 TABLET | Refills: 0 | Status: DISCONTINUED | OUTPATIENT
Start: 2024-01-06 | End: 2024-01-09

## 2024-01-06 RX ORDER — LEVOTHYROXINE SODIUM 100 UG/1
100 TABLET ORAL ONCE
Qty: 1 TABLET | Refills: 0 | Status: DISCONTINUED | OUTPATIENT
Start: 2024-01-06 | End: 2024-01-06

## 2024-01-06 RX ORDER — AMOXICILLIN 250 MG
1 CAPSULE ORAL 2 TIMES DAILY PRN
Status: DISCONTINUED | OUTPATIENT
Start: 2024-01-06 | End: 2024-01-20 | Stop reason: HOSPADM

## 2024-01-06 RX ORDER — ACYCLOVIR 400 MG/1
400 TABLET ORAL EVERY 12 HOURS
Status: DISCONTINUED | OUTPATIENT
Start: 2024-01-06 | End: 2024-01-20 | Stop reason: HOSPADM

## 2024-01-06 RX ORDER — BACLOFEN 10 MG/1
10 TABLET ORAL DAILY PRN
Status: DISCONTINUED | OUTPATIENT
Start: 2024-01-07 | End: 2024-01-20 | Stop reason: HOSPADM

## 2024-01-06 RX ORDER — SODIUM CHLORIDE 3 G/100ML
100 INJECTION, SOLUTION INTRAVENOUS ONCE
Status: COMPLETED | OUTPATIENT
Start: 2024-01-06 | End: 2024-01-06

## 2024-01-06 RX ORDER — AMOXICILLIN 250 MG
2 CAPSULE ORAL 2 TIMES DAILY PRN
Status: DISCONTINUED | OUTPATIENT
Start: 2024-01-06 | End: 2024-01-20 | Stop reason: HOSPADM

## 2024-01-06 RX ORDER — LATANOPROST 50 UG/ML
1 SOLUTION/ DROPS OPHTHALMIC DAILY
Status: DISCONTINUED | OUTPATIENT
Start: 2024-01-06 | End: 2024-01-20 | Stop reason: HOSPADM

## 2024-01-06 RX ORDER — BACLOFEN 10 MG/1
10-30 TABLET ORAL 2 TIMES DAILY
Status: DISCONTINUED | OUTPATIENT
Start: 2024-01-06 | End: 2024-01-06

## 2024-01-06 RX ORDER — FAMOTIDINE 20 MG/1
20 TABLET, FILM COATED ORAL AT BEDTIME
Status: DISCONTINUED | OUTPATIENT
Start: 2024-01-06 | End: 2024-01-09

## 2024-01-06 RX ORDER — ARMODAFINIL 200 MG/1
200 TABLET ORAL EVERY MORNING
COMMUNITY

## 2024-01-06 RX ADMIN — GABAPENTIN 300 MG: 300 CAPSULE ORAL at 20:48

## 2024-01-06 RX ADMIN — OXYBUTYNIN CHLORIDE 10 MG: 10 TABLET, EXTENDED RELEASE ORAL at 18:45

## 2024-01-06 RX ADMIN — Medication 600 MG: at 07:42

## 2024-01-06 RX ADMIN — SODIUM CHLORIDE 100 ML: 3 INJECTION, SOLUTION INTRAVENOUS at 02:00

## 2024-01-06 RX ADMIN — ACYCLOVIR 400 MG: 400 TABLET ORAL at 20:50

## 2024-01-06 RX ADMIN — ACETAMINOPHEN 975 MG: 325 TABLET, FILM COATED ORAL at 18:44

## 2024-01-06 RX ADMIN — DULOXETINE HYDROCHLORIDE 80 MG: 60 CAPSULE, DELAYED RELEASE ORAL at 14:15

## 2024-01-06 RX ADMIN — FAMOTIDINE 20 MG: 20 TABLET ORAL at 21:44

## 2024-01-06 RX ADMIN — SODIUM CHLORIDE: 9 INJECTION, SOLUTION INTRAVENOUS at 09:08

## 2024-01-06 RX ADMIN — ACYCLOVIR 400 MG: 400 TABLET ORAL at 07:43

## 2024-01-06 RX ADMIN — LEVOTHYROXINE SODIUM 100 MCG: 100 TABLET ORAL at 09:08

## 2024-01-06 RX ADMIN — OXYBUTYNIN CHLORIDE 10 MG: 10 TABLET, EXTENDED RELEASE ORAL at 07:42

## 2024-01-06 RX ADMIN — BACLOFEN 30 MG: 20 TABLET ORAL at 21:43

## 2024-01-06 RX ADMIN — GABAPENTIN 300 MG: 300 CAPSULE ORAL at 10:10

## 2024-01-06 RX ADMIN — FAMOTIDINE 40 MG: 20 TABLET ORAL at 07:42

## 2024-01-06 RX ADMIN — LATANOPROST 1 DROP: 50 SOLUTION OPHTHALMIC at 07:42

## 2024-01-06 RX ADMIN — CEFTRIAXONE SODIUM 1 G: 1 INJECTION, POWDER, FOR SOLUTION INTRAMUSCULAR; INTRAVENOUS at 09:05

## 2024-01-06 RX ADMIN — BACLOFEN 20 MG: 20 TABLET ORAL at 09:05

## 2024-01-06 RX ADMIN — ACETAMINOPHEN 975 MG: 325 TABLET, FILM COATED ORAL at 10:10

## 2024-01-06 ASSESSMENT — ACTIVITIES OF DAILY LIVING (ADL)
ADLS_ACUITY_SCORE: 39
ADLS_ACUITY_SCORE: 38
ADLS_ACUITY_SCORE: 39
DOING_ERRANDS_INDEPENDENTLY_DIFFICULTY: NO
ADLS_ACUITY_SCORE: 38
ADLS_ACUITY_SCORE: 39
ADLS_ACUITY_SCORE: 38
ADLS_ACUITY_SCORE: 39
ADLS_ACUITY_SCORE: 38
ADLS_ACUITY_SCORE: 39

## 2024-01-06 NOTE — ED TRIAGE NOTES
Presents with c/o an abnormal lab. States that she was notified of a critical low sodium. States that she is being treated for a UTI and has been having some intermittent confusion.      Triage Assessment (Adult)       Row Name 01/05/24 9471          Triage Assessment    Airway WDL WDL        Respiratory WDL    Respiratory WDL WDL        Cardiac WDL    Cardiac WDL WDL        Peripheral/Neurovascular WDL    Peripheral Neurovascular WDL WDL        Cognitive/Neuro/Behavioral WDL    Cognitive/Neuro/Behavioral WDL X     Level of Consciousness intermittent confusion     Arousal Level opens eyes spontaneously     Orientation oriented x 4     Speech clear;spontaneous     Mood/Behavior calm;cooperative        [place] Coma Scale    Best Eye Response 4-->(E4) spontaneous     Best Motor Response 6-->(M6) obeys commands     Best Verbal Response 5-->(V5) oriented     [place] Coma Scale Score 15

## 2024-01-06 NOTE — PLAN OF CARE
EB ED PT Hold: Per chart review, patient recently discharged home with hospital bed and lift with plan for dependent cares from spouse due to firm LE NWB status for 8 weeks.  Unfortunately, despite diligent cares from spouse patient still developed  stage 2 & 3 pressure wounds.    At time of this hospital admission, patient is nearing the 8 week elizabeth for being NWB and may benefit from re-consult from ortho to re-assess NWB status. If patient remains firm NWB, anticipate patient will again have limited benefit from IP PT service and will return home with support from spouse. If patient is in fact able to begin bearing weight with PT, she may benefit from TCU placement to maximize IND as she was previously able to perform pivot transfers and walk ~18' with walker.     Discussed with patient and attending. Will see patient for PT eval after WB status is clarified.

## 2024-01-06 NOTE — ED PROVIDER NOTES
[place] EMERGENCY DEPARTMENT ([facility])    1/05/24       ED PROVIDER NOTE      History     Chief Complaint   Patient presents with    Abnormal Labs     HPI  Marylee Woods is a 60-year-old female with a history of multiple sclerosis currently with a right leg fracture in cast and is wheelchair dependent. The patient also has a history of Graves' disease and chronic kidney disease.  Patient is on medications that include hydrochlorothiazide and Synthroid. The patient recently was seen this morning in the clinic and found to have a sodium of 119. Patient states she was called and was told to come to the ER for further evaluation. Patient states that otherwise she feels like she is at her baseline state of health and would not of come to the ER otherwise. Patient denies any coughing, any fevers, any vomiting, any diarrhea, but states that she has not been eating lately. The patient has been trying to supplement with Gatorade at home and Ensure shakes. The clinic apparently has been following her sodium recently. Today it was found to be 119 and she was told to come to the ER for further evaluation.    This part of the document was transcribed by Shailesh Catibceline.     Past Medical History  Past Medical History:   Diagnosis Date    Atypical ductal hyperplasia of left breast 02/2022    Depression     Gastro-oesophageal reflux disease     Graves disease     Multiple sclerosis (H)     Osteoporosis     Postablative hypothyroidism      Past Surgical History:   Procedure Laterality Date    C/SECTION, LOW TRANSVERSE  1992    COLONOSCOPY  2007    COLONOSCOPY N/A 1/26/2017    Procedure: COMBINED COLONOSCOPY, SINGLE OR MULTIPLE BIOPSY/POLYPECTOMY BY BIOPSY;  Surgeon: Mayo Adkins MD, MD;  [facility]    ESOPHAGOSCOPY, GASTROSCOPY, DUODENOSCOPY (EGD), COMBINED  1/26/2017    Dr. Adkins [facility]    ESOPHAGOSCOPY, GASTROSCOPY, DUODENOSCOPY (EGD), COMBINED N/A 1/26/2017    Procedure: COMBINED ESOPHAGOSCOPY,  GASTROSCOPY, DUODENOSCOPY (EGD), BIOPSY SINGLE OR MULTIPLE;  Surgeon: Mayo Adkins MD, MD;  Location:  GI    ESOPHAGOSCOPY, GASTROSCOPY, DUODENOSCOPY (EGD), COMBINED N/A 4/20/2023    Procedure: ESOPHAGOGASTRODUODENOSCOPY, WITH BIOPSY;  Surgeon: Cristina Zuniga MD;  Location: [facility]    HIP SURGERY  2009    femur ortho surgery    LAPAROSCOPIC CHOLECYSTECTOMY  6/24/2014    Procedure: LAPAROSCOPIC CHOLECYSTECTOMY;  Surgeon: Randy Bailey MD;  Location: [facility]    LUMPECTOMY BREAST Left 3/31/2022    Procedure: LEFT Radiofrequency Identification Seed-Localized Lumpectomy;  Surgeon: Amanda Liu MD;  Location: [facility] OR    OPEN REDUCTION INTERNAL FIXATION FEMUR DISTAL Left 11/1/2018    Procedure: OPEN REDUCTION INTERNAL FIXATION LEFT FEMUR DISTAL;  Surgeon: Jose Valadez MD;  Location: [facility]    OPEN REDUCTION INTERNAL FIXATION RODDING INTRAMEDULLARY TIBIA  4/14/2013    Procedure: OPEN REDUCTION INTERNAL FIXATION RODDING INTRAMEDULLARY TIBIA;;  Surgeon: Sajan Cast MD;  Location: [facility] OR    ORTHOPEDIC SURGERY  2009    surgery right upper femur fx near hip   acetaminophen (TYLENOL) 500 MG tablet  acyclovir (ZOVIRAX) 400 MG tablet  baclofen (LIORESAL) 10 MG tablet  calcium carbonate 500 mg, elemental, (OSCAL 500) 1250 (500 Ca) MG TABS tablet  cephALEXin (KEFLEX) 500 MG capsule  cholestyramine (QUESTRAN) 4 g packet  DULoxetine (CYMBALTA) 20 MG capsule  famotidine (PEPCID) 40 MG tablet  ferrous sulfate (FEROSUL) 325 (65 Fe) MG tablet  gabapentin (NEURONTIN) 300 MG capsule  hydrochlorothiazide (HYDRODIURIL) 12.5 MG tablet  latanoprost (XALATAN) 0.005 % ophthalmic solution  levothyroxine (SYNTHROID/LEVOTHROID) 100 MCG tablet  multivitamin w/minerals (THERA-VIT-M) tablet  oxybutynin (DITROPAN-XL) 10 MG 24 hr tablet  oxyCODONE (ROXICODONE) 5 MG tablet  polyethylene glycol (MIRALAX) 17 GM/Dose powder      Allergies   Allergen Reactions    Bactrim [Sulfamethoxazole-Trimethoprim]  Hallucination    Sulfamethizole     Amantadine      hallucinations    Budesonide     Budesonide-Formoterol Fumarate Rash     Family History  Family History   Problem Relation Age of Onset    Heart Disease Maternal Grandmother     Cancer Maternal Grandfather     Heart Disease Paternal Grandfather     Heart Disease Mother     Heart Disease Father     Diabetes No family hx of     Coronary Artery Disease No family hx of     Hypertension No family hx of     Hyperlipidemia No family hx of     Cerebrovascular Disease No family hx of     Breast Cancer No family hx of     Colon Cancer No family hx of     Prostate Cancer No family hx of     Other Cancer No family hx of     Depression No family hx of     Anxiety Disorder No family hx of     Mental Illness No family hx of     Substance Abuse No family hx of     Anesthesia Reaction No family hx of     Asthma No family hx of     Osteoporosis No family hx of     Genetic Disorder No family hx of     Thyroid Disease No family hx of     Obesity No family hx of     Unknown/Adopted No family hx of      Social History   Social History     Tobacco Use    Smoking status: Every Day     Packs/day: .25     Types: Cigarettes     Last attempt to quit: 2022     Years since quittin.0    Smokeless tobacco: Never   Vaping Use    Vaping Use: Never used   Substance Use Topics    Alcohol use: Yes     Alcohol/week: 0.0 standard drinks of alcohol     Comment: occasional     Drug use: No         A medically appropriate review of systems was performed with pertinent positives and negatives noted in the HPI, and all other systems negative.    Physical Exam   BP: 101/61  Pulse: 79  Temp: 97.5  F (36.4  C)  Resp: 16  Weight: 63.5 kg (140 lb)  SpO2: 99 %  Physical Exam  Vitals reviewed.   HENT:      Head: Atraumatic.      Comments: Thin hair  Eyes:      Extraocular Movements: Extraocular movements intact.      Pupils: Pupils are equal, round, and reactive to light.   Cardiovascular:      Rate and  Rhythm: Regular rhythm.   Pulmonary:      Breath sounds: Normal breath sounds.   Musculoskeletal:      Cervical back: Neck supple.      Comments: Cast on right lower extremity   Neurological:      Mental Status: She is alert and oriented to person, place, and time.      Cranial Nerves: No cranial nerve deficit.   Psychiatric:         Mood and Affect: Mood normal.           ED Course, Procedures, & Data      Procedures        IV initiated by nursing personnel for blood draw and fluid administration.  EKG revealed a normal sinus rhythm at a rate of 79 with a AL interval point 166 and a QRS duration of 0.090.  The patient had a normal axis with no acute ST or T wave changes significant for ischemia.  This is read by me personally.    Results for orders placed or performed during the hospital encounter of 01/05/24   Comprehensive metabolic panel     Status: Abnormal   Result Value Ref Range    Sodium 118 (LL) 135 - 145 mmol/L    Potassium 4.3 3.4 - 5.3 mmol/L    Carbon Dioxide (CO2) 24 22 - 29 mmol/L    Anion Gap 10 7 - 15 mmol/L    Urea Nitrogen 24.3 (H) 8.0 - 23.0 mg/dL    Creatinine 1.14 (H) 0.51 - 0.95 mg/dL    GFR Estimate 52 (L) >60 mL/min/1.73m2    Calcium 9.2 8.8 - 10.2 mg/dL    Chloride 84 (L) 98 - 107 mmol/L    Glucose 107 (H) 70 - 99 mg/dL    Alkaline Phosphatase 108 40 - 150 U/L    AST 36 0 - 45 U/L    ALT 17 0 - 50 U/L    Protein Total 6.9 6.4 - 8.3 g/dL    Albumin 3.6 3.5 - 5.2 g/dL    Bilirubin Total 0.2 <=1.2 mg/dL   TSH     Status: Abnormal   Result Value Ref Range    TSH 52.60 (H) 0.30 - 4.20 uIU/mL   Osmolality     Status: Abnormal   Result Value Ref Range    Osmolality Blood 262 (L) 280 - 301 mmol/kg    Narrative    Greater than 385 mmol/kg relates to stupor in hyperglycemia   Greater than 400 mmol/kg can relate to seizures   Greater than 420 mmol/kg can be lethal    Serum Osmalar Gap:   Normal <10   Larger suggest unmeasured substances present in serum (ethanol, methanol, isopropanol, mannitol,  ethylene glycol).   UA with Microscopic reflex to Culture     Status: Abnormal    Specimen: Urine, Clean Catch   Result Value Ref Range    Color Urine Orange (A) Colorless, Straw, Light Yellow, Yellow    Appearance Urine Cloudy (A) Clear    Glucose Urine Negative Negative mg/dL    Bilirubin Urine Negative Negative    Ketones Urine Negative Negative mg/dL    Specific Gravity Urine 1.016 1.003 - 1.035    Blood Urine Moderate (A) Negative    pH Urine 5.5 5.0 - 7.0    Protein Albumin Urine 30 (A) Negative mg/dL    Urobilinogen Urine Normal Normal, 2.0 mg/dL    Nitrite Urine Negative Negative    Leukocyte Esterase Urine Large (A) Negative    WBC Clumps Urine Present (A) None Seen /HPF    Hyphal Yeast Urine Few (A) None Seen /HPF    RBC Urine 20 (H) <=2 /HPF    WBC Urine >182 (H) <=5 /HPF    Squamous Epithelials Urine 9 (H) <=1 /HPF    Narrative    Urine Culture ordered based on laboratory criteria   Osmolality urine     Status: Normal   Result Value Ref Range    Osmolality Urine 353 100 - 1,200 mmol/kg    Narrative    Reference Ranges depend on patient's hydration status and renal function.   Neonates:  mmol/kg   2 years and older, random specimens: 100-1200 mmol/kg; Greater than 850 mmol/kg after 12 hour fluid restriction  Urine/serum osmolality ratio: 2 years and older: 1.0-3.0; 3.0-4.7 after 12 hour fluid restriction   CBC with platelets and differential     Status: Abnormal   Result Value Ref Range    WBC Count 7.2 4.0 - 11.0 10e3/uL    RBC Count 3.31 (L) 3.80 - 5.20 10e6/uL    Hemoglobin 10.2 (L) 11.7 - 15.7 g/dL    Hematocrit 30.0 (L) 35.0 - 47.0 %    MCV 91 78 - 100 fL    MCH 30.8 26.5 - 33.0 pg    MCHC 34.0 31.5 - 36.5 g/dL    RDW 15.2 (H) 10.0 - 15.0 %    Platelet Count 248 150 - 450 10e3/uL    % Neutrophils 78 %    % Lymphocytes 12 %    % Monocytes 6 %    % Eosinophils 3 %    % Basophils 1 %    % Immature Granulocytes 0 %    NRBCs per 100 WBC 0 <1 /100    Absolute Neutrophils 5.7 1.6 - 8.3 10e3/uL     Absolute Lymphocytes 0.9 0.8 - 5.3 10e3/uL    Absolute Monocytes 0.4 0.0 - 1.3 10e3/uL    Absolute Eosinophils 0.2 0.0 - 0.7 10e3/uL    Absolute Basophils 0.0 0.0 - 0.2 10e3/uL    Absolute Immature Granulocytes 0.0 <=0.4 10e3/uL    Absolute NRBCs 0.0 10e3/uL   EKG 12-lead, tracing only     Status: None (Preliminary result)   Result Value Ref Range    Systolic Blood Pressure  mmHg    Diastolic Blood Pressure  mmHg    Ventricular Rate 79 BPM    Atrial Rate 79 BPM    IA Interval 166 ms    QRS Duration 90 ms     ms    QTc 444 ms    P Axis 75 degrees    R AXIS 17 degrees    T Axis 56 degrees    Interpretation ECG       Sinus rhythm  Possible Anterior infarct , age undetermined  Abnormal ECG     CBC with platelets differential     Status: Abnormal    Narrative    The following orders were created for panel order CBC with platelets differential.  Procedure                               Abnormality         Status                     ---------                               -----------         ------                     CBC with platelets and d...[[id number]]  Abnormal            Final result                 Please view results for these tests on the individual orders.     Medications   sodium chloride (PF) 0.9% PF flush 3 mL (3 mLs Intracatheter Not Given 1/5/24 2349)   sodium chloride (PF) 0.9% PF flush 3 mL (has no administration in time range)   sodium chloride 0.9 % infusion ( Intravenous $New Bag 1/5/24 5211)   sodium chloride 3% BOLUS 100 mL (has no administration in time range)   sodium chloride 0.9% BOLUS 1,000 mL (1,000 mLs Intravenous $New Bag 1/5/24 2643)           Critical care was performed.   Critical Care Addendum  My initial assessment, based on my initial review of history and lab report, established a high suspicion that Marylee Woods has  severe hyponatremia , which requires immediate intervention, and therefore she is critically ill.     After the initial assessment, the care team initiated multiple  lab tests, initiated IV fluid administration, and initiated medication therapy with 3% saline  to provide stabilization care. Due to the critical nature of this patient, I reassessed physical exam, mental status, and neurologic status multiple times prior to her disposition.     Time also spent performing documentation and reviewing test results.     Critical care time (excluding teaching time and procedures): Greater than 30 minutes.     Assessment & Plan      I have reviewed the nursing notes.     I have reviewed the findings, diagnosis, and plan  with the patient.        Final diagnoses:   Hyponatremia     Adm Med    Christiano Hong MD  [facility] EMERGENCY DEPARTMENT  1/5/2024     Christiano Hong MD  01/06/24 0052

## 2024-01-06 NOTE — ED NOTES
"Patient states she was feeling \"foggy\" and now having dull pain to the back of her head. Dr. Hong notified.   "

## 2024-01-06 NOTE — PROGRESS NOTES
[facility]    Medicine Progress Note - Hospitalist Service, GOLD TEAM 11    Date of Admission:  1/5/2024    Assessment & Plan      Marylee Woods is a 68 year old female admitted on 1/5/2024. She has a history of Graves disease s/p ablation , HTN, CKD< MARYLU, depression, grade 2 DCIS of L breast, GERD, osteoporosis, and MS c/b neurogenic bladder and is admitted for severe hyponatremia, also found to have new pressure ulcers.      #) Hyponatremia    Patient found to be hyponatremic to 119.  Was given IV fluids and 3% saline in the [place] [facility] with improvement that has continued with gentle administration of normal saline.  Multiple likely causes of hyponatremia but do expect true hypovolemia superimposed on high ADH state. Urine indices directionally helpful but not accurate given diuretics.     - appreciate nephrology input  - continue NS at 75 ml/h   - follow Na q4h  - Goal of increasing ~6 meq/liter over next 24h - aim for ~129 by tomorrow morning  - restart duloxetine  - stop hydrochlorothiazide, would not restart  - cortisol normal     #) Recent right distal femur fracture  #) Recent left medial malleolus fracture    Patient to be NWB for 8 weeks (until at least 1/11) but scheduled to see ortho on 1/24.  Patient cannot go to TCU until she is able to weight bear and she would very much benefit from a rehab stay.     - after Na improved, will discuss again with ortho about timeline to see if we can expedite rehab stay    #) Cystitis with hematuria  #) Pyuria     Patient was started on Keflex 500 mg prior to admission and vancomycin/ceftriaxone after admission.    - follow urine culture  - stop vancomycin, continue ceftriaxone for now, would treat for 3 days unless culture negative.    #) Sacral wounds, present on admission    Patient not able to mobilize well and has been resisting turns at home.  Suspect a combination of poor mobility as well as suboptimal  nutrition.    - would consult  - nutrition consult  - would benefit from rehab stay  - turn q2h    #) Lactic acidosis    Improved with hydration; suspect related to hypovolemia.     - follow PRN      #) Acute kidney injury on chronic kidney disease    Patient noted to have a history of CKD.  Baseline creatinine on prior discharge summary was estimated to have been around 1, was 1.14 at admission. Resolved with fluids.    - follow    #) Multiple sclerosis  #) Neurogenic bladder  -Continue PTA oxybutynin       #) Hx of Graves s/p thyroid ablation  -TSH elevated to 52 on admission but has been missing doses  -repeat in 2-4 weeks  -Continue PTA levothyroxine    #) HSV prophylaxis   -Continue pta acyclovir 400mg    #)  Anemia of chronic disease  Admission Hgb of 10.2. and stable  - follow           Diet: Regular Diet Adult    DVT Prophylaxis: Pneumatic Compression Devices  Verduzco Catheter: Not present  Lines: None     Cardiac Monitoring: None  Code Status: Full Code      Clinically Significant Risk Factors Present on Admission         # Hyponatremia: Lowest Na = 118 mmol/L in last 2 days, will monitor as appropriate    # Hypomagnesemia: Lowest Mg = 1.6 mg/dL in last 2 days, will replace as needed                  # Financial/Environmental Concerns:           Disposition Plan      Expected Discharge Date: 01/10/2024                    Paco Collins MD  Hospitalist Service, GOLD TEAM [address] [place] [address]  Securely message with [employer] (more info)  Text page via [employer] Paging/Directory   See signed in provider for up to date coverage information  ______________________________________________________________________    Interval History     Feeling better after fluids.  Patient and her spouse would like her to rehab but not able to go until she is weight bearing.     Physical Exam   Vital Signs: Temp: 98.5  F (36.9  C) Temp src: Oral BP: 138/77 Pulse: 83   Resp: 16 SpO2: 100 % O2  Device: None (Room air)    Weight: 140 lbs 0 oz    General Appearance: Lying in gurney, conversational  Respiratory: clear to auscultation bilaterally with good air entry   Cardiovascular: normal rate, regular rhythm. No murmurs, rubs, or gallops   GI: soft, non-tender, non-distended.  No hepatosplenomegaly or mass.  Skin: There is a stage 3 sacral pressure ulcer with mild surrounding erythema and another left buttock wound  Other: Right lower extremity in cast.      Medical Decision Making             Data     I have personally reviewed the following data over the past 24 hrs:    6.0  \   9.0 (L)   / 199     123 (L) 88 (L) 20.0 /  95   3.5 21 (L) 0.84 \     ALT: 17 AST: 36 AP: 108 TBILI: 0.2   ALB: 3.6 TOT PROTEIN: 6.9 LIPASE: N/A     TSH: 52.60 (H) T4: 1.09 A1C: N/A     Procal: N/A CRP: N/A Lactic Acid: 1.0         Imaging results reviewed over the past 24 hrs:   No results found for this or any previous visit (from the past 24 hour(s)).

## 2024-01-06 NOTE — TELEPHONE ENCOUNTER
ON CALL NOTE:  S: Paged to call lab for critical result.  Na of 119.    O:  Component      Latest Ref Rng 11/19/2023  12:21 PM 11/20/2023  5:06 AM 1/3/2024  4:13 PM 1/5/2024  12:05 PM   Sodium      135 - 145 mmol/L 138  139  126 (L)  119 (LL)    Potassium      3.4 - 5.3 mmol/L 4.6  4.3  3.7  4.3    Chloride      98 - 107 mmol/L 106  104  87 (L)  85 (L)    Carbon Dioxide (CO2)      22 - 29 mmol/L 26  25  29  24    Anion Gap      7 - 15 mmol/L 6 (L)  10  10  10    Urea Nitrogen      8.0 - 23.0 mg/dL 19.4  16.2  25.2 (H)  21.9    Creatinine      0.51 - 0.95 mg/dL 1.04 (H)  0.90  1.50 (H)  0.84    GFR Estimate      >60 mL/min/1.73m2 58 (L)  69  38 (L)  75    Calcium      8.8 - 10.2 mg/dL 8.3 (L)  8.2 (L)  9.6  9.4    Glucose      70 - 99 mg/dL 131 (H)  90  90  95       Legend:  (L) Low  (H) High  (LL) Low Panic    P: I recommend she present to ER as sodium is < 120.   states she hadn't eaten for a couple of days but has been eating today and feeling better today following treatment for pyelonephritis.  Nevertheless I do feel it best that she be assessed in ER given critical low Na.   wanted to go to [facility] but I did advise them to go to [facility].  Transportation is an issue as she typically takes [employer].  They may need to call for ambulance if no other option.    Neli Azul MD  [facility]

## 2024-01-06 NOTE — PROGRESS NOTES
Gave report to charge nurse on [facility]. [facility] called for transport.

## 2024-01-06 NOTE — PHARMACY-VANCOMYCIN DOSING SERVICE
Pharmacy Vancomycin Initial Note  Date of Service 2024  Patient's  1955  68 year old, female    Indication: Skin and Soft Tissue Infection    Current estimated CrCl = Estimated Creatinine Clearance: 47.3 mL/min (A) (based on SCr of 1.14 mg/dL (H)).    Creatinine for last 3 days  1/3/2024:  4:13 PM Creatinine 1.50 mg/dL  2024: 12:05 PM Creatinine 0.84 mg/dL; 11:40 PM Creatinine 1.14 mg/dL    Recent Vancomycin Level(s) for last 3 days  No results found for requested labs within last 3 days.      Vancomycin IV Administrations (past 72 hours)        No vancomycin orders with administrations in past 72 hours.                    Nephrotoxins and other renal medications (From now, onward)      Start     Dose/Rate Route Frequency Ordered Stop    24 0810  vancomycin (VANCOCIN) 1,250 mg in 0.9% NaCl 250 mL intermittent infusion         1,250 mg  over 90 Minutes Intravenous EVERY 24 HOURS 24 0805      24 0800  acyclovir (ZOVIRAX) tablet 400 mg        Note to Pharmacy: PTA Sig:Take 1 tablet (400 mg) by mouth every 12 hours      400 mg Oral EVERY 12 HOURS 24 0520              Contrast Orders - past 72 hours (72h ago, onward)      None            [employer] Prediction of Planned Initial Vancomycin Regimen  Loading dose: N/A  Regimen: 1250 mg IV every 24 hours.  Start time: 08:05 on 2024  Exposure target: AUC24 (range)400-600 mg/L.hr   AUC24,ss: 548 mg/L.hr  Probability of AUC24 > 400: 83 %  Ctrough,ss: 16.5 mg/L  Probability of Ctrough,ss > 20: 31 %  Probability of nephrotoxicity (Lodise ANDREA ): 12 %        Plan:  Start vancomycin 1250 mg IV q24h.   Vancomycin monitoring method: AUC  Vancomycin therapeutic monitoring goal: 400-600 mg*h/L  Pharmacy will check vancomycin levels as appropriate in 1-3 Days.    Serum creatinine levels will be ordered daily for the first week of therapy and at least twice weekly for subsequent weeks.      Letha Ortiz, [facility]

## 2024-01-06 NOTE — H&P
[facility]    History and Physical - Medicine Service, MAROON TEAM        Date of Admission:  1/5/2024    Assessment & Plan      Marylee Woods is a 68 year old female admitted on 1/5/2024. She has a history of Graves disease s/p ablation , HTN, CKD< MARYLU, depression, grade 2 DCIS of L breast, GERD, osteoporosis, and MS c/b neurogenic bladder and is admitted for confusion and found to have hyponatremia in clinic.     Hyponatremia, symptomatic, improved   Patient found to be hyponatremic to 119.  Was given IV fluids and 3% saline in the ED with rise to 120 by  0300.  Patient was noted to have experienced headache, confusion, and hallucinations that been worsening over the last week.  Symptoms have since improved with IV fluids and 3% saline in the ED. alert oriented x 3.  Serum awesome's was 262 and urine awesome's of 353.  Urine sodium was 48.  Notably patient has had decreased solute intake due to recent fall resulting in a right distal femur fracture and left malleolus fracture.  This led to a sacral wound, and discomfort which contributed to patient limiting p.o. intake as endorsed by patient and her .  On exam patient appears hypovolemic.  Overall appears to be hypoosmolar hypovolemic hyponatremia.  Component of SIADH also possible.  Also given history of Graves' disease and patient having recently missed some levothyroxine doses, hypothyroidism could be contributing.  Patient also has recurrent UTIs and was recently given antibiotics for concerns of cystitis.  Infection could be contributing to SIADH and leading to worsening hyponatremia.  Overall patient has many reasons to be hyponatremic.  -Every 2 hours sodium checks  -3% saline given in the ED with improved symptoms, help further with 3% saline   -Goal of 124-126 over 24 hours,    -Consider continuing 3% if Na continues to be <120   -Fluid restriction of 1L   -AM cortisol  -Continue PTA levothyroxine    -PT/OT consulted  -Holding PTA hydrochlorothiazide   -Holding PTA Duloxtine      Cystitis with hematuria  Pyuria   Patient was started on Keflex 500 mg prior to admission.  There was concern that acute cystitis was contributing to altered cognition.  Given the patient's presenting symptom was confusion, this is entirely possible, however WBC was not elevated.  Patient's symptoms improved with hypertonic saline to treat hyponatremia, go against cystitis as a possible cause of the presenting symptoms.    -Patient not meeting SIRS criteria and normal WBC, but given lactic acidosis of unclear etiology and hx of cystitis and frequent UTI, will start IV ceftriaxone and vanc on admission.  -IV Vanc + Ceftri  -MRSA Nares pending      Sacral wounds  Patient noted to have had a mechanical fall with recent hospitalization for distal fracture of femur.  Due to this patient notes that she has been less mobile.  Patient's  is worried this is related to development of sacral wounds over the past week.  -Wound ostomy care nursing consult      Lactic acidosis  Patient presented with a lactic acid of 2.5.  Patient did not meet SIRS criteria on admission, no tachycardia or tachypnea.  Patient was recently started on Keflex prior to admission, for concerns of cystitis/UTI.  Possible that lactic acidosis is explained by infection.  Patient also has new sacral wounds that could be causing localized skin infection that is contributing to elevated lactic acid.  Another possibility is that patient is euvolemic, but noted DENNISE on admission labs.  Unlikely but could be contributing to elevated lactic acid.        DENNISE on CKD   Patient noted to have a history of CKD.  Baseline creatinine on prior discharge summary was estimated to have been 0.96-1.08.  Patient presenting with elevated creatinine of 1.14.  Given concerns for hypovolemic hypoosmolar hyponatremia, possible prerenal etiology in the setting of poor intake.      MS  Hx  Neurogenic bladder  -Continue PTA oxybutynin       Hx of Graves s/p thyroid ablation  -TSH elevated to 52 on admission, pt notes missing some doses of levothyroxine this past week  -Continue PTA levothyroxine    HSV Ppx  -Continue pta acyclovir 400mg    Anemia of chronic disease  Admission Hgb of 10.2. and stable  -CTM with AM labs          Diet:  Fluid restriction of 1L   DVT Prophylaxis: Pneumatic Compression Devices, Padua of 3  Verduzco Catheter: Not present  Fluids: s/p 3%NS and NS in ED   Lines: None     Cardiac Monitoring: None  Code Status: Full Code      Clinically Significant Risk Factors Present on Admission         # Hyponatremia: Lowest Na = 118 mmol/L in last 2 days, will monitor as appropriate    # Hypomagnesemia: Lowest Mg = 1.6 mg/dL in last 2 days, will replace as needed                # Financial/Environmental Concerns:           Disposition Plan      Expected Discharge Date: 01/10/2024                The patient's care to be staffed in the AM       MAGDALENA OSEGUERA MD  Medicine Service, [facility]  Securely message with [employer] (more info)  Text page via [facility] Paging/Directory   See signed in provider for up to date coverage information  ______________________________________________________________________    Chief Complaint   Confusion and hallucinations, found to be hyponatremic    History is obtained from the patient    History of Present Illness   Marylee Woods is a 68 year old female admitted on 1/5/2024. She has a history of Graves disease s/p ablation , HTN, CKD< MARYLU, depression, grade 2 DCIS of L breast, GERD, osteoporosis, and MS c/b neurogenic bladder and is admitted for confusion and found to have hyponatremia in clinic.     History is provided by patient and her .  Patient  notes that since she was discharged from her prior admission in November, patient has become increasingly confused, particularly over the last 2  weeks.   endorsed episodes of hallucinations and confusion at home.  This was attributed to a UTI, for which they were given antibiotics by their primary care provider.  Patient and the  deny any fevers, cold symptoms, cough, or sick contacts.    Patient's  is also concerned about recent development of sacral wound, given patient has been more mobile after femur fracture.  Patient also notes that due to the discomfort from her fracture and recent confusion, she has had poor oral intake.  Patient's  notes that she has been drinking mostly water without food intake.    Patient and her  note she has continued to take her prescribed medications.  Patient's  notes his suspicions that antibiotics may be contributing to his wife's confusion.     Patient denies any chest pain, shortness of breath, or abdominal pain.  She notes transient numbness/tingling, that has since resolved.  She notes she felt foggy when she first presented to the ED.  She also describes a headache which is since resolved.  Patient's  notes that she is now much improved in terms of confusion at interview.      Past Medical History    Past Medical History:   Diagnosis Date    Atypical ductal hyperplasia of left breast 02/2022    Depression     Gastro-oesophageal reflux disease     Graves disease     Multiple sclerosis (H)     Osteoporosis     Postablative hypothyroidism        Past Surgical History   Past Surgical History:   Procedure Laterality Date    C/SECTION, LOW TRANSVERSE  1992    COLONOSCOPY  2007    COLONOSCOPY N/A 1/26/2017    Procedure: COMBINED COLONOSCOPY, SINGLE OR MULTIPLE BIOPSY/POLYPECTOMY BY BIOPSY;  Surgeon: Mayo Adkins MD, MD;  Location: [facility]    ESOPHAGOSCOPY, GASTROSCOPY, DUODENOSCOPY (EGD), COMBINED  1/26/2017    Dr. Adkins [facility]    ESOPHAGOSCOPY, GASTROSCOPY, DUODENOSCOPY (EGD), COMBINED N/A 1/26/2017    Procedure: COMBINED ESOPHAGOSCOPY, GASTROSCOPY, DUODENOSCOPY (EGD), BIOPSY  SINGLE OR MULTIPLE;  Surgeon: Mayo Adkins MD, MD;  Location:  GI    ESOPHAGOSCOPY, GASTROSCOPY, DUODENOSCOPY (EGD), COMBINED N/A 4/20/2023    Procedure: ESOPHAGOGASTRODUODENOSCOPY, WITH BIOPSY;  Surgeon: Cristina Zuniga MD;  Location:  GI    HIP SURGERY  2009    femur ortho surgery    LAPAROSCOPIC CHOLECYSTECTOMY  6/24/2014    Procedure: LAPAROSCOPIC CHOLECYSTECTOMY;  Surgeon: Randy Bailey MD;  Location:  [facility]    LUMPECTOMY BREAST Left 3/31/2022    Procedure: LEFT Radiofrequency Identification Seed-Localized Lumpectomy;  Surgeon: Amanda Liu MD;  Location: [facility] OR    OPEN REDUCTION INTERNAL FIXATION FEMUR DISTAL Left 11/1/2018    Procedure: OPEN REDUCTION INTERNAL FIXATION LEFT FEMUR DISTAL;  Surgeon: Jose Valadez MD;  Location: [facility]    OPEN REDUCTION INTERNAL FIXATION RODDING INTRAMEDULLARY TIBIA  4/14/2013    Procedure: OPEN REDUCTION INTERNAL FIXATION RODDING INTRAMEDULLARY TIBIA;;  Surgeon: Sajan Cast MD;  Location: [facility] OR    ORTHOPEDIC SURGERY  2009    surgery right upper femur fx near hip       Prior to Admission Medications   Prior to Admission Medications   Prescriptions Last Dose Informant Patient Reported? Taking?   DULoxetine (CYMBALTA) 20 MG capsule   No No   Sig: Take 1 capsule (20 mg) by mouth daily Take along with 60 mg for a total of 80 mg   acetaminophen (TYLENOL) 500 MG tablet   No No   Sig: Take 1-2 tablets (500-1,000 mg) by mouth every 8 hours as needed for mild pain or fever (greater than 101 degrees)   acyclovir (ZOVIRAX) 400 MG tablet   No No   Sig: Take 1 tablet (400 mg) by mouth every 12 hours   baclofen (LIORESAL) 10 MG tablet   Yes No   Sig: Take 10-30 mg by mouth 2 times daily Take 2 tablets (20 MG) by mouth every morning, 1 tablet (10 MG) by mouth daily in the afternoon as needed, and 3 tablets (30 MG) by mouth every evening before bedtime.   calcium carbonate 500 mg, elemental, (OSCAL 500) 1250 (500 Ca) MG TABS tablet   Yes No    Sig: Take 1 tablet by mouth daily   cephALEXin (KEFLEX) 500 MG capsule   No No   Sig: Take 1 capsule (500 mg) by mouth 2 times daily for 7 days   cholestyramine (QUESTRAN) 4 g packet   No No   Sig: Take 1 packet (4 g) by mouth 2 times daily   famotidine (PEPCID) 40 MG tablet   No No   Sig: Take 1 tablet (40 mg) by mouth At Bedtime   ferrous sulfate (FEROSUL) 325 (65 Fe) MG tablet   Yes No   Sig: Take 325 mg by mouth daily (with breakfast)   gabapentin (NEURONTIN) 300 MG capsule   No No   Sig: Take 1 capsule (300 mg) by mouth 2 times daily   hydrochlorothiazide (HYDRODIURIL) 12.5 MG tablet   No No   Sig: Take 1 tablet (12.5 mg) by mouth every morning   latanoprost (XALATAN) 0.005 % ophthalmic solution   Yes No   Sig: INSTILL 1 DROP INTO BOTH EYES EVERY DAY AS DIRECTED PT WILL NEED TO BE SEEN FOR FUTURE REFILLS   levothyroxine (SYNTHROID/LEVOTHROID) 100 MCG tablet   No No   Sig: TAKE 1 TABLET BY MOUTH SIX TIMES A WEEK   multivitamin w/minerals (THERA-VIT-M) tablet   Yes No   Sig: Take 1 tablet by mouth every evening   oxyCODONE (ROXICODONE) 5 MG tablet   No No   Sig: Take 0.5 tablets (2.5 mg) by mouth every 6 hours as needed for moderate pain   oxybutynin (DITROPAN-XL) 10 MG 24 hr tablet   No No   Sig: Take 1 tablet (10 mg) by mouth 2 times daily   polyethylene glycol (MIRALAX) 17 GM/Dose powder   No No   Sig: Take 17 g by mouth daily      Facility-Administered Medications: None        Review of Systems    Negative unless noted in HPI above     Physical Exam   Vital Signs: Temp: 97.5  F (36.4  C) Temp src: Temporal BP: 101/61 Pulse: 79   Resp: 16 SpO2: 99 % O2 Device: None (Room air)    Weight: 140 lbs 0 oz    Constitutional: Alert oriented x 3  ENT: Normocephalic, without obvious abnormality, atraumatic, sinuses nontender on palpation, external ears without lesions, oral pharynx with moist mucous membranes, tonsils without erythema or exudates, gums normal and good dentition.  Respiratory: No increased work of  breathing, good air exchange, clear to auscultation bilaterally, no crackles or wheezing  Cardiovascular: Normal apical impulse, regular rate and rhythm, normal S1 and S2, no S3 or S4, and no murmur noted  GI: No scars, normal bowel sounds, soft, non-distended, non-tender, no masses palpated, no hepatosplenomegally  Skin: Large erythematous weeping sacral wound present over buttock.  No bleeding or pus noted.  Musculoskeletal: Right leg in cast.  Moves all extremities spontaneously.    Medical Decision Making       Please see A&P for additional details of medical decision making.      Data   ------------------------- PAST 24 HR DATA REVIEWED -----------------------------------------------

## 2024-01-06 NOTE — TELEPHONE ENCOUNTER
Call from Lab with critical value  Lab was ordered by SWAPNIL Butcher   transferred to answering service.      Clau Hanna RN, BSN  Triage Nurse Advisor    Reason for Disposition    Lab or radiology calling with CRITICAL test results    Protocols used: PCP Call - No Triage-A-

## 2024-01-06 NOTE — PROGRESS NOTES
Woods, Marylee is a 68 year old female hx of Graves disease s/p ablation, HTN, MARYLU, depression admitted for severe hyponatremia likely due to hypovolemia is the setting of poor po intake and use of diuretics. She has remained on normal saline IVF with improvement in her Na. Na at 5 pm is 124 which is on track. Continue NS 75 ml/hr and continue Q4H Na checks.       GALI TRUONG MD  Hospitalist Service  [facility]

## 2024-01-07 LAB
BACTERIA UR CULT: ABNORMAL
HOLD SPECIMEN: NORMAL
SODIUM SERPL-SCNC: 125 MMOL/L (ref 135–145)
SODIUM SERPL-SCNC: 129 MMOL/L (ref 135–145)
SODIUM SERPL-SCNC: 130 MMOL/L (ref 135–145)
SODIUM SERPL-SCNC: 133 MMOL/L (ref 135–145)

## 2024-01-07 PROCEDURE — 84295 ASSAY OF SERUM SODIUM: CPT | Performed by: INTERNAL MEDICINE

## 2024-01-07 PROCEDURE — 250N000013 HC RX MED GY IP 250 OP 250 PS 637: Performed by: INTERNAL MEDICINE

## 2024-01-07 PROCEDURE — 250N000013 HC RX MED GY IP 250 OP 250 PS 637

## 2024-01-07 PROCEDURE — 258N000003 HC RX IP 258 OP 636: Performed by: INTERNAL MEDICINE

## 2024-01-07 PROCEDURE — 250N000011 HC RX IP 250 OP 636

## 2024-01-07 PROCEDURE — 36415 COLL VENOUS BLD VENIPUNCTURE: CPT | Performed by: INTERNAL MEDICINE

## 2024-01-07 PROCEDURE — 250N000013 HC RX MED GY IP 250 OP 250 PS 637: Performed by: STUDENT IN AN ORGANIZED HEALTH CARE EDUCATION/TRAINING PROGRAM

## 2024-01-07 PROCEDURE — 99222 1ST HOSP IP/OBS MODERATE 55: CPT | Performed by: INTERNAL MEDICINE

## 2024-01-07 PROCEDURE — G0463 HOSPITAL OUTPT CLINIC VISIT: HCPCS

## 2024-01-07 PROCEDURE — 120N000002 HC R&B MED SURG/OB UMMC

## 2024-01-07 PROCEDURE — 999N000111 HC STATISTIC OT IP EVAL DEFER

## 2024-01-07 PROCEDURE — 99232 SBSQ HOSP IP/OBS MODERATE 35: CPT | Performed by: INTERNAL MEDICINE

## 2024-01-07 RX ADMIN — BACLOFEN 30 MG: 20 TABLET ORAL at 21:28

## 2024-01-07 RX ADMIN — DULOXETINE HYDROCHLORIDE 80 MG: 60 CAPSULE, DELAYED RELEASE ORAL at 07:57

## 2024-01-07 RX ADMIN — SODIUM CHLORIDE: 9 INJECTION, SOLUTION INTRAVENOUS at 07:42

## 2024-01-07 RX ADMIN — ACYCLOVIR 400 MG: 400 TABLET ORAL at 07:57

## 2024-01-07 RX ADMIN — Medication 600 MG: at 07:57

## 2024-01-07 RX ADMIN — ACETAMINOPHEN 975 MG: 325 TABLET, FILM COATED ORAL at 00:07

## 2024-01-07 RX ADMIN — ACETAMINOPHEN 975 MG: 325 TABLET, FILM COATED ORAL at 08:05

## 2024-01-07 RX ADMIN — CEFTRIAXONE SODIUM 1 G: 1 INJECTION, POWDER, FOR SOLUTION INTRAMUSCULAR; INTRAVENOUS at 07:54

## 2024-01-07 RX ADMIN — LATANOPROST 1 DROP: 50 SOLUTION OPHTHALMIC at 08:05

## 2024-01-07 RX ADMIN — ACETAMINOPHEN 975 MG: 325 TABLET, FILM COATED ORAL at 16:25

## 2024-01-07 RX ADMIN — Medication 2.5 MG: at 21:28

## 2024-01-07 RX ADMIN — GABAPENTIN 300 MG: 300 CAPSULE ORAL at 07:58

## 2024-01-07 RX ADMIN — GABAPENTIN 300 MG: 300 CAPSULE ORAL at 21:28

## 2024-01-07 RX ADMIN — BACLOFEN 20 MG: 20 TABLET ORAL at 07:59

## 2024-01-07 RX ADMIN — ACYCLOVIR 400 MG: 400 TABLET ORAL at 21:29

## 2024-01-07 RX ADMIN — OXYCODONE HYDROCHLORIDE 5 MG: 5 TABLET ORAL at 00:09

## 2024-01-07 RX ADMIN — SODIUM CHLORIDE: 9 INJECTION, SOLUTION INTRAVENOUS at 21:57

## 2024-01-07 RX ADMIN — FAMOTIDINE 20 MG: 20 TABLET ORAL at 21:29

## 2024-01-07 RX ADMIN — Medication 2.5 MG: at 14:32

## 2024-01-07 ASSESSMENT — ACTIVITIES OF DAILY LIVING (ADL)
ADLS_ACUITY_SCORE: 43
ADLS_ACUITY_SCORE: 49
ADLS_ACUITY_SCORE: 49
ADLS_ACUITY_SCORE: 39
ADLS_ACUITY_SCORE: 39
ADLS_ACUITY_SCORE: 43
ADLS_ACUITY_SCORE: 43
ADLS_ACUITY_SCORE: 39
ADLS_ACUITY_SCORE: 49
ADLS_ACUITY_SCORE: 43
ADLS_ACUITY_SCORE: 49
ADLS_ACUITY_SCORE: 49

## 2024-01-07 NOTE — PROGRESS NOTES
Patient admitted to unit this shift accompanied by  and family member Pt. A&O x4 able to make her needs known. Pt. Original lives home and cared for by . Pt. Came in due to low sodium. Skin assessment done with 2nd RN Deena. Old scaring and faded bruises. Left heel (red) Mepliex applied, stage 2&3 pressure wounds on buttocks and coccyx area.   Pt. On 75 NS ml/hr. New PIV was put in. Pt. Has a HX. Of osteoporosis and MS, can assist with turns however is a Ax2 with lift. Uses electrical Wheelchair to get around. Wheelchair was delivered around the time of admission. Bed changed to low air loss this shift. Pure wick in place. Pt. Takes medication whole with thin liquids. Continue POC.

## 2024-01-07 NOTE — PLAN OF CARE
Occupational Therapy: Orders received. Chart reviewed and discussed with care team.? Occupational Therapy not indicated due to pt dependent for all cares at baseline, was limited to OH lift for transfers 2/2 NWB status.? Spouse completes all cares for patient. Defer discharge recommendations to medical.? Will complete orders.     Please re-consult if there is a change in status and skilled need arises.

## 2024-01-07 NOTE — CARE PLAN
Additional med-rec completed. Deleted cholestyramine & oxybutynin. Added prn baclofen. Did not change dosing of gabapentin, day team can consider.         GALI TRUONG MD  Hospitalist Service  [facility]

## 2024-01-07 NOTE — CONSULTS
[facility] Nurse Inpatient Assessment     Consulted for: Buttocks, left hip (IT), and left heel    Summary: Deep tissue pressure injury to coccyx and left IT. Peeling skin to left heel.     Patient History (according to provider note(s):      Marylee Woods is a 68 year old female admitted on 1/5/2024. She has a history of Graves disease s/p ablation , HTN, CKD< MARYLU, depression, grade 2 DCIS of L breast, GERD, osteoporosis, and MS c/b neurogenic bladder and is admitted for severe hyponatremia, also found to have new pressure ulcers.     Assessment:      Areas visualized during today's visit: Perineal area, Sacrum/coccyx, and Lower extremities     Heels: peeling skin on heels, not a pressure injury at this time.     1/5 photo per provider    Pressure Injury Location: coccyx    1/7/24  Last photo: 1/7/24  Wound type: Pressure Injury     Pressure Injury Stage: Deep Tissue Pressure Injury (DTPI), present on admission   Wound history/plan of care:  Deep tissue pressure injury which is starting to peel.   Wound base: 50 % maroon, 50 % fibrin vs slough     Palpation of the wound bed: normal      Drainage: small     Description of drainage: serosanguinous     Measurements (length x width x depth, in cm) 2.5  x 4  x  0.2 cm   Periwound skin: Intact      Color: normal and consistent with surrounding tissue      Temperature: normal   Odor: none  Pain: mild, tender  Pain intervention prior to dressing change: slow and gentle cares   Treatment goal: Heal   STATUS: initial assessment  Supplies ordered: supplies stored on unit, discussed with RN, and discussed with patient     Pressure Injury Location: Left IT    1/7/24  Last photo: 1/7/24  Wound type: Pressure Injury     Pressure Injury Stage: Deep Tissue Pressure Injury (DTPI), present on admission   Wound history/plan of care:  Non blanchable deep maroon bruise within slowly blanchable erythema.   Wound base: 100 % maroon,      Palpation of the wound bed: normal      Drainage: none     Description of drainage: none     Measurements (length x width x depth, in cm)   Erythema: 3.5 x 2.5 x 0 cm  Maroon: 1 x 2 x 0 cm   Periwound skin: Intact      Color: normal and consistent with surrounding tissue      Temperature: normal   Odor: none  Pain: denies , none  Pain intervention prior to dressing change: no significant pain present   Treatment goal: Heal  and Protection  STATUS: initial assessment  Supplies ordered: supplies stored on unit and discussed with RN         Treatment Plan:     Coccyx and Left IT wound(s): Every 3 days  Cleanse the area with wound cleanser and pat dry.  Apply No sting film barrier to periwound skin.  Cover wound with Sacral Mepilex or mepilex (Left IT)  Change dressing Q 3 days.  Turn and reposition Q 2hrs side to side only.  Avoid HOB greather than 30 except for meals. Lower HOB when done eating.   Ensure pt has Redd-cushion while sitting up in the chair.  FYI- If pt has constant incontinent loose stools needing dressing changes Q shift please discontinue the Mepilex dressing and apply criticaid barrier paste BID and PRN.    Heels: Daily  Prevalon boot to left foot for prevention.   Elevate leg on 2 pillows to float heels.     Orders: Written    RECOMMEND PRIMARY TEAM ORDER: None, at this time  Education provided: importance of repositioning, plan of care, and wound progress  Discussed plan of care with: Patient and Nurse  WOC nurse follow-up plan: weekly  Notify WOC if wound(s) deteriorate.  Nursing to notify the Provider(s) and re-consult the WOC Nurse if new skin concern.    DATA:     Current support surface: Standard  Low air loss (MISTY pump, Isolibrium, Pulsate, skin guard, etc)  Containment of urine/stool: Purewick external catheter   BMI: Body mass index is 21.29 kg/m .   Active diet order: Orders Placed This Encounter      Regular Diet Adult     Output: I/O last 3 completed shifts:  In: 240 [P.O.:240]  Out: -       Labs:   Recent Labs   Lab 01/06/24  1057 01/05/24  2340   ALBUMIN  --  3.6   HGB 9.0* 10.2*   WBC 6.0 7.2     Pressure injury risk assessment:   Sensory Perception: 2-->very limited  Moisture: 3-->occasionally moist  Activity: 2-->chairfast  Mobility: 2-->very limited  Nutrition: 3-->adequate  Friction and Shear: 2-->potential problem  Broderick Score: 14    Shannan Alexander RN CWOCN  Pager no longer is use, please contact through [employer]   Ruslan group: [facility] Nurse [facility]  Dept. Office Number: *[phone]

## 2024-01-07 NOTE — PROGRESS NOTES
[facility]    Medicine Progress Note - Hospitalist Service, GOLD TEAM 16    Date of Admission:  1/5/2024    Assessment & Plan      Marylee Woods is a 68 year old female admitted on 1/5/2024. She has a history of Graves disease s/p ablation , HTN, CKD< MARYLU, depression, grade 2 DCIS of L breast, GERD, osteoporosis, and MS c/b neurogenic bladder and is admitted for severe hyponatremia, also found to have new pressure ulcers.    #) Hyponatremia  Patient found to be hyponatremic to 119. Was given IV fluids and 3% saline in the [place] Department with improvement that has continued with gentle administration of normal saline. Multiple likely causes of hyponatremia but do expect true hypovolemia superimposed on high ADH state. Patient taking hydrochlorothiazide.     - Hyponatremia improving. Na 130. Continue monitoring.   - Continue on normal saline.   - Stop hydrochlorothiazide, would not restart    #) Recent right distal femur fracture  #) Recent left medial malleolus fracture  - Patient to be NWB for 8 weeks (until at least 1/11) but scheduled to see ortho on 1/24.    - SW consult. Continue working with PT / OT.     #) Cystitis with hematuria  #) Pyuria   Patient was started on Keflex 500 mg prior to admission and vancomycin/ceftriaxone after admission.  - follow urine culture  - stop vancomycin, continue ceftriaxone for now, would treat for 3 days unless culture negative.    #) Sacral wounds, present on admission  Patient not able to mobilize well and has been resisting turns at home.  Suspect a combination of poor mobility as well as suboptimal nutrition.  - would consult  - nutrition consult  - would benefit from rehab stay  - turn q2h    #) Lactic acidosis - resolved.     #) Acute kidney injury on chronic kidney disease - DENNISE resolved.     #) Multiple sclerosis  #) Neurogenic bladder  - Continue PTA oxybutynin     #) Hx of Graves s/p thyroid ablation  -TSH elevated to  52 on admission but has been missing doses  -repeat in 2-4 weeks  -Continue PTA levothyroxine    #) HSV prophylaxis   -Continue pta acyclovir 400mg    #)  Anemia of chronic disease  Admission Hgb of 10.2. and stable  - follow           Diet: Regular Diet Adult    DVT Prophylaxis: Pneumatic Compression Devices  Verduzco Catheter: Not present  Lines: None     Cardiac Monitoring: None  Code Status: Full Code      Clinically Significant Risk Factors         # Hyponatremia: Lowest Na = 118 mmol/L in last 2 days, will monitor as appropriate    # Hypomagnesemia: Lowest Mg = 1.6 mg/dL in last 2 days, will replace as needed                    # Financial/Environmental Concerns:           Disposition Plan     Expected Discharge Date: 01/10/2024                    Pankaj Cloud MD  Hospitalist Service, GOLD TEAM 16  [facility]  Securely message with [employer] (more info)  Text page via [employer] Paging/Directory   See signed in provider for up to date coverage information  ______________________________________________________________________    Interval History     Patient transferred to .   No acute events overnight.   Patient was pleasant.   Hyponatremia improving.     Physical Exam   Vital Signs: Temp: 97.9  F (36.6  C) Temp src: Oral BP: 105/51 Pulse: 79   Resp: 18 SpO2: 96 % O2 Device: None (Room air)    Weight: 140 lbs 0 oz    General Appearance: Alert, room air, no acute distress.   Respiratory: clear to auscultation bilaterally with good air entry   Cardiovascular: RRR. No murmurs, rubs, or gallops   GI: soft, non-tender, non-distended.  No hepatosplenomegaly or mass.  Other: Right lower extremity in cast.      Medical Decision Making             Data     I have personally reviewed the following data over the past 24 hrs:    6.0  \   9.0 (L)   / 199     133 (L) N/A N/A /  N/A   N/A N/A N/A \       Imaging results reviewed over the past 24 hrs:   No results found for this or  any previous visit (from the past 24 hour(s)).

## 2024-01-07 NOTE — PHARMACY-ADMISSION MEDICATION HISTORY
Pharmacist Admission Medication History    Admission medication history is complete. The information provided in this note is only as accurate as the sources available at the time of the update.    Information Source(s): Patient and Family member via in-person    Pertinent Information: n/a    Changes made to PTA medication list:  Added: armodanifil  Deleted: cholestyramine, ferrous sulfate, hydrochlorothiazide, oxybutyninb  Changed: changed calcium carbonate to BID    Medication Affordability:       Allergies reviewed with patient and updates made in EHR: yes    Medication History Completed By: Irwin Choudhury [facility] 1/6/2024 6:14 PM       Prior to Admission medications    Medication Sig Last Dose Taking? Auth Provider Long Term End Date   acetaminophen (TYLENOL) 500 MG tablet Take 1-2 tablets (500-1,000 mg) by mouth every 8 hours as needed for mild pain or fever (greater than 101 degrees) 1/5/2024 at pm Yes Carolina Pulliam MD     acyclovir (ZOVIRAX) 400 MG tablet Take 1 tablet (400 mg) by mouth every 12 hours 1/5/2024 at pm Yes Carolina Pulliam MD Yes    Armodafinil 200 MG TABS Take 200 mg by mouth every morning 1/5/2024 at am Yes Unknown, Entered By History No    baclofen (LIORESAL) 10 MG tablet Take 10-30 mg by mouth 2 times daily Take 2 tablets (20 MG) by mouth every morning, 1 tablet (10 MG) by mouth daily in the afternoon as needed, and 3 tablets (30 MG) by mouth every evening before bedtime. 1/5/2024 at pm Yes Reported, Patient No    calcium carbonate 500 mg, elemental, (OSCAL 500) 1250 (500 Ca) MG TABS tablet Take 2 tablets by mouth at bedtime 1/5/2024 at pm Yes Reported, Patient     cephALEXin (KEFLEX) 500 MG capsule Take 1 capsule (500 mg) by mouth 2 times daily for 7 days 1/5/2024 at pm Yes Bambi Ny CNP  1/10/24   DULoxetine (CYMBALTA) 20 MG capsule Take 1 capsule (20 mg) by mouth daily Take along with 60 mg for a total of 80 mg 1/5/2024 at am Yes Carolina Pulliam MD Yes    famotidine  (PEPCID) 40 MG tablet Take 1 tablet (40 mg) by mouth At Bedtime 1/5/2024 at pm Yes Carolina Pulliam MD     gabapentin (NEURONTIN) 300 MG capsule Take 1 capsule (300 mg) by mouth 2 times daily  Patient taking differently: Take 600 mg by mouth 4 times daily 1/5/2024 at pm Yes Randell Sinclair MD Yes    latanoprost (XALATAN) 0.005 % ophthalmic solution INSTILL 1 DROP INTO BOTH EYES EVERY DAY AS DIRECTED PT WILL NEED TO BE SEEN FOR FUTURE REFILLS 1/5/2024 at am Yes Reported, Patient Yes    levothyroxine (SYNTHROID/LEVOTHROID) 100 MCG tablet TAKE 1 TABLET BY MOUTH SIX TIMES A WEEK 1/5/2024 at am Yes Carolina Pulliam MD Yes    multivitamin w/minerals (THERA-VIT-M) tablet Take 1 tablet by mouth every evening 1/5/2024 at pm Yes Reported, Patient     oxyCODONE (ROXICODONE) 5 MG tablet Take 0.5 tablets (2.5 mg) by mouth every 6 hours as needed for moderate pain More than a month Yes Carolina Pulliam MD No    polyethylene glycol (MIRALAX) 17 GM/Dose powder Take 17 g by mouth daily Past Week Yes Randell Sinclair MD No

## 2024-01-07 NOTE — PROGRESS NOTES
End of shift Summary      0.9% NS 75 ml/hr          Neuro: Afebrile, A&OX4, Pain managed with PRN oxycodone and scheduled  tynelol a  Cardiac: Normotensive  Resp: LS CTA, Sats >95% on RA, LE edema  GI/: Reg diet, No BM. Pure wick in place, partially functional.   Activity: Q2h turns in bed/ electric w/c PTA  Skin : Sacral wounds from previous PI, Rt leg cast   LDA's : ( R) PIV x 1      Plan:  Continue Plan of Care/ Na trending q4h

## 2024-01-08 LAB
BACTERIA BLD CULT: NO GROWTH
BACTERIA BLD CULT: NO GROWTH
HOLD SPECIMEN: NORMAL
SODIUM SERPL-SCNC: 128 MMOL/L (ref 135–145)
SODIUM SERPL-SCNC: 129 MMOL/L (ref 135–145)
SODIUM SERPL-SCNC: 131 MMOL/L (ref 135–145)

## 2024-01-08 PROCEDURE — 36415 COLL VENOUS BLD VENIPUNCTURE: CPT | Performed by: INTERNAL MEDICINE

## 2024-01-08 PROCEDURE — 250N000013 HC RX MED GY IP 250 OP 250 PS 637

## 2024-01-08 PROCEDURE — 250N000013 HC RX MED GY IP 250 OP 250 PS 637: Performed by: INTERNAL MEDICINE

## 2024-01-08 PROCEDURE — 99233 SBSQ HOSP IP/OBS HIGH 50: CPT | Performed by: INTERNAL MEDICINE

## 2024-01-08 PROCEDURE — 99232 SBSQ HOSP IP/OBS MODERATE 35: CPT | Performed by: INTERNAL MEDICINE

## 2024-01-08 PROCEDURE — 250N000011 HC RX IP 250 OP 636

## 2024-01-08 PROCEDURE — 258N000003 HC RX IP 258 OP 636: Performed by: INTERNAL MEDICINE

## 2024-01-08 PROCEDURE — 84295 ASSAY OF SERUM SODIUM: CPT | Performed by: INTERNAL MEDICINE

## 2024-01-08 PROCEDURE — 120N000002 HC R&B MED SURG/OB UMMC

## 2024-01-08 PROCEDURE — 250N000013 HC RX MED GY IP 250 OP 250 PS 637: Performed by: STUDENT IN AN ORGANIZED HEALTH CARE EDUCATION/TRAINING PROGRAM

## 2024-01-08 RX ORDER — SODIUM CHLORIDE 9 MG/ML
INJECTION, SOLUTION INTRAVENOUS CONTINUOUS
Status: DISCONTINUED | OUTPATIENT
Start: 2024-01-08 | End: 2024-01-08

## 2024-01-08 RX ADMIN — DULOXETINE HYDROCHLORIDE 80 MG: 60 CAPSULE, DELAYED RELEASE ORAL at 09:06

## 2024-01-08 RX ADMIN — Medication 2.5 MG: at 16:47

## 2024-01-08 RX ADMIN — ACETAMINOPHEN 975 MG: 325 TABLET, FILM COATED ORAL at 16:51

## 2024-01-08 RX ADMIN — LEVOTHYROXINE SODIUM 100 MCG: 100 TABLET ORAL at 09:06

## 2024-01-08 RX ADMIN — BACLOFEN 10 MG: 10 TABLET ORAL at 16:47

## 2024-01-08 RX ADMIN — ACETAMINOPHEN 975 MG: 325 TABLET, FILM COATED ORAL at 00:17

## 2024-01-08 RX ADMIN — BACLOFEN 30 MG: 20 TABLET ORAL at 20:02

## 2024-01-08 RX ADMIN — ACYCLOVIR 400 MG: 400 TABLET ORAL at 20:03

## 2024-01-08 RX ADMIN — LATANOPROST 1 DROP: 50 SOLUTION OPHTHALMIC at 12:42

## 2024-01-08 RX ADMIN — BACLOFEN 20 MG: 20 TABLET ORAL at 09:06

## 2024-01-08 RX ADMIN — SODIUM CHLORIDE: 9 INJECTION, SOLUTION INTRAVENOUS at 11:09

## 2024-01-08 RX ADMIN — Medication 600 MG: at 12:42

## 2024-01-08 RX ADMIN — GABAPENTIN 300 MG: 300 CAPSULE ORAL at 09:06

## 2024-01-08 RX ADMIN — GABAPENTIN 300 MG: 300 CAPSULE ORAL at 20:03

## 2024-01-08 RX ADMIN — ACYCLOVIR 400 MG: 400 TABLET ORAL at 09:06

## 2024-01-08 RX ADMIN — FAMOTIDINE 20 MG: 20 TABLET ORAL at 20:03

## 2024-01-08 RX ADMIN — CEFTRIAXONE SODIUM 1 G: 1 INJECTION, POWDER, FOR SOLUTION INTRAMUSCULAR; INTRAVENOUS at 09:10

## 2024-01-08 RX ADMIN — ACETAMINOPHEN 975 MG: 325 TABLET, FILM COATED ORAL at 09:06

## 2024-01-08 ASSESSMENT — ACTIVITIES OF DAILY LIVING (ADL)
ADLS_ACUITY_SCORE: 49
DEPENDENT_IADLS:: CLEANING;COOKING;LAUNDRY;SHOPPING;MEAL PREPARATION;TRANSPORTATION;INCONTINENCE
ADLS_ACUITY_SCORE: 49
ADLS_ACUITY_SCORE: 53
ADLS_ACUITY_SCORE: 49

## 2024-01-08 NOTE — CONSULTS
Care Management Initial Consult    General Information  Assessment completed with: Patient,    Type of CM/SW Visit: Initial Assessment    Primary Care Provider verified and updated as needed: Yes   Readmission within the last 30 days: no previous admission in last 30 days      Reason for Consult: discharge planning  Advance Care Planning: Advance Care Planning Reviewed: no concerns identified          Communication Assessment  Patient's communication style: spoken language (English or Bilingual)    Hearing Difficulty or Deaf: no   Wear Glasses or Blind: yes    Cognitive  Cognitive/Neuro/Behavioral: WDL  Level of Consciousness: alert  Arousal Level: opens eyes spontaneously  Orientation: oriented x 4  Mood/Behavior: calm, cooperative  Best Language: 0 - No aphasia  Speech: clear, spontaneous    Living Environment:   People in home: spouse     Current living Arrangements: house      Able to return to prior arrangements: other (see comments)  Living Arrangement Comments: Patient and spouse requesting TCU    Family/Social Support:  Care provided by: self, homecare agency  Provides care for: no one, unable/limited ability to care for self  Marital Status:   , Sibling(s), Other (specify) (multiple friends)  Manuel       Description of Support System: Supportive, Involved    Support Assessment: Adequate family and caregiver support    Current Resources:   Patient receiving home care services: Yes  Skilled Home Care Services: Skilled Nursing, Physical Therapy, Occupational Therapy, Home Health Aid  Community Resources:    Equipment currently used at home: lift device, hospital bed, wheelchair, power, commode chair  Supplies currently used at home: Incontinence Supplies, Gloves, Wipes    Employment/Financial:  Employment Status: disabled, retired        Financial Concerns: none   Referral to Financial Worker: No       Does the patient's insurance plan have a 3 day qualifying hospital stay waiver?   No    Lifestyle & Psychosocial Needs:  Social Determinants of Health     Food Insecurity: Low Risk  (1/3/2024)    Food Insecurity     Within the past 12 months, did you worry that your food would run out before you got money to buy more?: No     Within the past 12 months, did the food you bought just not last and you didn t have money to get more?: No   Depression: At risk (1/3/2024)    PHQ-2     PHQ-2 Score: 3   Housing Stability: Low Risk  (1/3/2024)    Housing Stability     Do you have housing? : Yes     Are you worried about losing your housing?: No   Tobacco Use: High Risk (1/5/2024)    Patient History     Smoking Tobacco Use: Every Day     Smokeless Tobacco Use: Never     Passive Exposure: Not on file   Financial Resource Strain: Low Risk  (1/3/2024)    Financial Resource Strain     Within the past 12 months, have you or your family members you live with been unable to get utilities (heat, electricity) when it was really needed?: No   Alcohol Use: Not on file   Transportation Needs: Low Risk  (1/3/2024)    Transportation Needs     Within the past 12 months, has lack of transportation kept you from medical appointments, getting your medicines, non-medical meetings or appointments, work, or from getting things that you need?: No   Physical Activity: Not on file   Interpersonal Safety: Not on file   Stress: Not on file   Social Connections: Not on file       Functional Status:  Prior to admission patient needed assistance:   Dependent ADLs:: Bathing, Dressing, Incontinence, Wheelchair-with assist  Dependent IADLs:: Cleaning, Cooking, Laundry, Shopping, Meal Preparation, Transportation, Incontinence       Mental Health Status:  Mental Health Status: No Current Concerns       Chemical Dependency Status:  Chemical Dependency Status: No Current Concerns             Values/Beliefs:  Spiritual, Cultural Beliefs, Sikh Practices, Values that affect care: no       Cultural/Sikh Practices Patient Routinely  Participates In: prayer  Values/Beliefs Comment: Stanley    Additional Information:  Chart Review:  Marylee Woods is a 60-year-old female with a history of multiple sclerosis currently with a right leg fracture in cast and is wheelchair dependent. The patient also has a history of Graves' disease and chronic kidney disease.  Patient is on medications that include hydrochlorothiazide and Synthroid. The patient recently was seen this morning in the clinic and found to have a sodium of 119. Patient states she was called and was told to come to the ER for further evaluation. Patient states that otherwise she feels like she is at her baseline state of health and would not of come to the ER otherwise. Patient denies any coughing, any fevers, any vomiting, any diarrhea, but states that she has not been eating lately. The patient has been trying to supplement with Gatorade at home and Ensure shakes. The clinic apparently has been following her sodium recently. Today it was found to be 119 and she was told to come to the ER for further evaluation.       This RNCC met with patient at bedside to introduce role of RNCC/SW and complete initial assessment. Patient had three friends at bedside, was okay to continue assessment while they were at the bedside.  Patient verified home address and PC were still the same since the last admit in November. Patient is also still receiving home care from [facility]. Patient states she wants to discharge to a TCU for continued therapy as well as help with her wound care. Patient states she has been having her  do the wound care and turn her every two hours but does not feel he is doing an adequate job. Informed patient that she needs to work with PT/OT as she will need recommendations from therapies, patient  states she is aware of this and can't start until she is weight bearing. Patient would like TCU referrals sent to [facility], [facility], [facility] when she is  cleared for TCU.  Provided patient with RNCC/SW contact information.    Returned call and left VM message left for patients sister Matt Gil [phone] .      Nydia Mehta BSN RN CCM  RN Care Coordinator 5 MS and 10 ICU  [address] [place] [address]  [email or web address]   Office:   [phone]   Pager: [phone]    [facility] Pager:5 ortho,5 Med/Surg & WB ED  [phone]   Weekend 6MS, 8A, 10ICU- Pager: [phone]     For weekend RN care coordinator needs (home discharge with needs including home care, assisted living facility returns, durable medical equip, IV antibiotics  [facility] Pager:5 ortho,5 Med/Surg & WB ED   [phone]  RNCC Weekend 6MS, 8A, 10ICU- Pager: [phone]

## 2024-01-08 NOTE — PLAN OF CARE
Goal Outcome Evaluation:      Plan of Care Reviewed With: patient    Overall Patient Progress: Improving    Outcome Evaluation: A/O x4,  at bedside earlier in evening helping with turns/ cares. Coccyx & IT wounds, dressings changed. Denies sob or chest pain. Q2 hour turns. Prevalon boot to LLE.

## 2024-01-08 NOTE — PROGRESS NOTES
CLINICAL NUTRITION SERVICES - ASSESSMENT NOTE     Nutrition Prescription    RECOMMENDATIONS FOR MDs/PROVIDERS TO ORDER:  None today     Malnutrition Status:    Moderate malnutrition in the context of acute on chronic illness or injury    Recommendations already ordered by Registered Dietitian (RD):  Chocolate Ensure once daily at 2 pm snack time + PRN (pt/staff can request additional servings at any time)     Future/Additional Recommendations:  Monitor meal/supplement intakes, wt/lab trends      REASON FOR ASSESSMENT  Marylee Woods is a/an 68 year old female assessed by the dietitian for Positive Admission Nutrition Risk Screen (14-23 lb wt loss, decreased appetite)     NUTRITION/MEDICAL HISTORY  Per chart review: Pt admits to the hospital in the setting of hyponatremia, new pressure ulcer, cystitis with hematuria, and pyuria. Other past medical history noted for Graves disease s/p ablation , HTN, CKD, MARYLU, depression, grade 2 DCIS of L breast, GERD, osteoporosis, and MS c/b neurogenic bladder.     Per pt visit: RD is familiar with pt from previous admission, reviewed weight trends and concern for loss, unsure if pt was reported wrong number, but pt notes recent trend has been around 140 lbs and that is stable from current weight, did review last noted weight to assure appears stable, pt still feels has lost and has some chronic poor appetite (1-2 meals per day), reviewed the importance of adequate nutritional intakes, reviewed/noted reason for current admit (Na and wounds), encouraged good source of protein at all meals, having more consistent meal intakes of solid foods, reviewed supplement options noting pt has taken in the past, pt agreeing with order as above and agreeing to attempt RD recommendations for improving nutritional intakes at home.      CURRENT NUTRITION ORDERS  Diet: Regular  Intake/Tolerance: 50-75% thus far     LABS  Labs reviewed, Na trends noted     MEDICATIONS  Medications  "reviewed    ANTHROPOMETRICS  Ht Readings from Last 1 Encounters:   01/03/24 1.727 m (5' 8\")   Most Recent Weight: 63.5 kg (140 lb)    IBW: 63.6 kg  BMI: Normal BMI  Weight History:   12/29/23 62.6 kg (138 lb)   11/25/23 62.6 kg (138 lb 0.1 oz)   04/26/23 66.1 kg (145 lb 11.2 oz)   03/17/23 67.6 kg (149 lb)   03/31/22 59 kg (130 lb)     Weight assessment: Current weight appears stable/up from 2 months ago, non-significant 3.4% weight loss in >6 months.     Dosing Weight: 63.5 kg    ASSESSED NUTRITION NEEDS  Estimated Energy Needs: 7070-2273 kcals/day (25 - 30 kcals/kg)  Justification: Maintenance  Estimated Protein Needs: 65-75 grams protein/day (1 - 1.2 grams of pro/kg)  Justification: Maintenance vs increased needs   Estimated Fluid Needs: 1 mL/kcal  Justification: Maintenance    PHYSICAL FINDINGS  WOC RN note reviewed - DTPI to coccyx and left IT    MALNUTRITION  % Intake: Decreased intake does not meet criteria  % Weight Loss: Weight loss does not meet criteria  Subcutaneous Fat Loss: Facial region: mild  Muscle Loss: Temporal, facial & jaw region: mild - underlying chronic medical diagnosis noted as well   Fluid Accumulation/Edema: Mild to moderate per flow sheets   Malnutrition Diagnosis: Moderate malnutrition in the context of acute on chronic illness or injury     NUTRITION DIAGNOSIS  Predicted inadequate nutrient intake related to multifactorial (acute on chronic illness, reduced appetite, other) as evidenced by pt reports       INTERVENTIONS  Implementation  Nutrition Education: Reviewed the importance of adequate nutritional intakes at bedside   Medical food supplement therapy - ordered as above per pt preference     Goals  Patient to consume % of nutritionally adequate meal trays TID, or the equivalent with supplements/snacks.     Monitoring/Evaluation  Progress toward goals will be monitored and evaluated per protocol.   Janis Epperson RD, CNSC, LD  [facility] 5 Med/Surg RD pager: [phone]  "

## 2024-01-08 NOTE — PROGRESS NOTES
Nephrology progress Note  January 8, 2024        ASSESSMENT AND RECOMMENDATIONS:   # Hyponatremia   # DENNISE- resolved   # Cystitis   # recent rt distal femoral fracture   # Multiple sclerosis/ neurogenic bladder     Her hyponatremia slightly worsening with continuing IV fluids. This can suggest an element of SIADH. This could have been precipitated by the pain from the fracture and nausea from antibiotics she received earlier.  -I have stopped her fluids and her sodium is still pending.  -check serum Sodium BID  -Please send a new urine sample for sodium and urine osm.  -Continue to hold hydrochlorothiazide   -continue levothyroxine and adjust as appropriate  -Fluid restriction to 1 L per day. Patient agreed to that.    Recommendations were communicated to primary team via this note     KIRK JONES MD   Division of Renal Disease and Hypertension  [employer]  Vocera Web Console      REASON FOR CONSULT: Hyponatremia     HISTORY OF PRESENT ILLNESS:  Admitting provider and nursing notes reviewed  Marylee Woods is a 68 year old female with a history of MS, neurogenic bladder, recent rt femoral fracture, cystitis is admitted with hyponatremia. She reports of having decreased PO intake at home as she was feeling weak and had poor appetite. She reports she is feeling much better now and has some appetite now. She denies any nausea, vomiting,headaches.     PAST MEDICAL HISTORY:  Reviewed with patient on 01/08/2024     Past Medical History:   Diagnosis Date    Atypical ductal hyperplasia of left breast 02/2022    Depression     Gastro-oesophageal reflux disease     Graves disease     Multiple sclerosis (H)     Osteoporosis     Postablative hypothyroidism        Past Surgical History:   Procedure Laterality Date    C/SECTION, LOW TRANSVERSE  1992    COLONOSCOPY  2007    COLONOSCOPY N/A 1/26/2017    Procedure: COMBINED COLONOSCOPY, SINGLE OR MULTIPLE BIOPSY/POLYPECTOMY BY BIOPSY;  Surgeon: Mayo Adkins MD, MD;  Location:  GI     ESOPHAGOSCOPY, GASTROSCOPY, DUODENOSCOPY (EGD), COMBINED  1/26/2017    Dr. Adkins [facility]    ESOPHAGOSCOPY, GASTROSCOPY, DUODENOSCOPY (EGD), COMBINED N/A 1/26/2017    Procedure: COMBINED ESOPHAGOSCOPY, GASTROSCOPY, DUODENOSCOPY (EGD), BIOPSY SINGLE OR MULTIPLE;  Surgeon: Mayo Adkins MD, MD;  Location:  GI    ESOPHAGOSCOPY, GASTROSCOPY, DUODENOSCOPY (EGD), COMBINED N/A 4/20/2023    Procedure: ESOPHAGOGASTRODUODENOSCOPY, WITH BIOPSY;  Surgeon: Cristina Zuniga MD;  Location: [facility]    HIP SURGERY  2009    femur ortho surgery    LAPAROSCOPIC CHOLECYSTECTOMY  6/24/2014    Procedure: LAPAROSCOPIC CHOLECYSTECTOMY;  Surgeon: Randy Bailey MD;  Location: [facility]    LUMPECTOMY BREAST Left 3/31/2022    Procedure: LEFT Radiofrequency Identification Seed-Localized Lumpectomy;  Surgeon: Amanda Liu MD;  Location: [facility] OR    OPEN REDUCTION INTERNAL FIXATION FEMUR DISTAL Left 11/1/2018    Procedure: OPEN REDUCTION INTERNAL FIXATION LEFT FEMUR DISTAL;  Surgeon: Jose Valadez MD;  Location: [facility]    OPEN REDUCTION INTERNAL FIXATION RODDING INTRAMEDULLARY TIBIA  4/14/2013    Procedure: OPEN REDUCTION INTERNAL FIXATION RODDING INTRAMEDULLARY TIBIA;;  Surgeon: Sajan Cast MD;  Location: [facility]    ORTHOPEDIC SURGERY  2009    surgery right upper femur fx near hip        MEDICATIONS:  PTA Meds  Prior to Admission medications    Medication Sig Last Dose Taking? Auth Provider Long Term End Date   acetaminophen (TYLENOL) 500 MG tablet Take 1-2 tablets (500-1,000 mg) by mouth every 8 hours as needed for mild pain or fever (greater than 101 degrees) 1/5/2024 at pm Yes Carolina Pulliam MD     acyclovir (ZOVIRAX) 400 MG tablet Take 1 tablet (400 mg) by mouth every 12 hours 1/5/2024 at pm Yes Carolina Pulliam MD Yes    Armodafinil 200 MG TABS Take 200 mg by mouth every morning 1/5/2024 at am Yes Unknown, Entered By History No    baclofen (LIORESAL) 10 MG tablet Take 10-30 mg by mouth 2 times  daily Take 2 tablets (20 MG) by mouth every morning, 1 tablet (10 MG) by mouth daily in the afternoon as needed, and 3 tablets (30 MG) by mouth every evening before bedtime. 1/5/2024 at pm Yes Reported, Patient No    calcium carbonate 500 mg, elemental, (OSCAL 500) 1250 (500 Ca) MG TABS tablet Take 2 tablets by mouth at bedtime 1/5/2024 at pm Yes Reported, Patient     cephALEXin (KEFLEX) 500 MG capsule Take 1 capsule (500 mg) by mouth 2 times daily for 7 days 1/5/2024 at pm Yes Bambi Ny CNP  1/10/24   DULoxetine (CYMBALTA) 20 MG capsule Take 1 capsule (20 mg) by mouth daily Take along with 60 mg for a total of 80 mg 1/5/2024 at am Yes Carolina Pulliam MD Yes    famotidine (PEPCID) 40 MG tablet Take 1 tablet (40 mg) by mouth At Bedtime 1/5/2024 at pm Yes Carolina Pulliam MD     gabapentin (NEURONTIN) 300 MG capsule Take 1 capsule (300 mg) by mouth 2 times daily  Patient taking differently: Take 600 mg by mouth 4 times daily 1/5/2024 at pm Yes Randell Sinclair MD Yes    latanoprost (XALATAN) 0.005 % ophthalmic solution INSTILL 1 DROP INTO BOTH EYES EVERY DAY AS DIRECTED PT WILL NEED TO BE SEEN FOR FUTURE REFILLS 1/5/2024 at am Yes Reported, Patient Yes    levothyroxine (SYNTHROID/LEVOTHROID) 100 MCG tablet TAKE 1 TABLET BY MOUTH SIX TIMES A WEEK  Patient taking differently: Does not take on Sunday 1/5/2024 at am Yes Carolina Pulliam MD Yes    multivitamin w/minerals (THERA-VIT-M) tablet Take 1 tablet by mouth every evening 1/5/2024 at pm Yes Reported, Patient     oxyCODONE (ROXICODONE) 5 MG tablet Take 0.5 tablets (2.5 mg) by mouth every 6 hours as needed for moderate pain More than a month Yes Carolina Pulliam MD No    polyethylene glycol (MIRALAX) 17 GM/Dose powder Take 17 g by mouth daily Past Week Yes Randell Sinclair MD No       Current Meds   acetaminophen  975 mg Oral Q8H    acyclovir  400 mg Oral Q12H    Baclofen  20 mg Oral QAM    Baclofen  30 mg Oral At Bedtime    calcium carbonate   600 mg Oral Daily    cefTRIAXone  1 g Intravenous Q24H    DULoxetine  80 mg Oral Daily    famotidine  20 mg Oral At Bedtime    gabapentin  300 mg Oral BID    latanoprost  1 drop Both Eyes Daily    levothyroxine  100 mcg Oral Once per day on     sodium chloride (PF)  3 mL Intracatheter Q8H    sodium chloride (PF)  3 mL Intracatheter Q8H     Infusion Meds   sodium chloride 75 mL/hr at 24 [id number]       ALLERGIES:    Allergies   Allergen Reactions    Bactrim [Sulfamethoxazole-Trimethoprim] Hallucination    Sulfamethizole     Amantadine      hallucinations    Budesonide     Budesonide-Formoterol Fumarate Rash       REVIEW OF SYSTEMS:  A comprehensive of systems was negative except as noted above.    SOCIAL HISTORY:   Social History     Socioeconomic History    Marital status:      Spouse name: Not on file    Number of children: Not on file    Years of education: Not on file    Highest education level: Not on file   Occupational History    Not on file   Tobacco Use    Smoking status: Every Day     Packs/day: .25     Types: Cigarettes     Last attempt to quit: 2022     Years since quittin.0    Smokeless tobacco: Never   Vaping Use    Vaping Use: Never used   Substance and Sexual Activity    Alcohol use: Yes     Alcohol/week: 0.0 standard drinks of alcohol     Comment: occasional     Drug use: No    Sexual activity: Yes     Partners: Male   Other Topics Concern    Parent/sibling w/ CABG, MI or angioplasty before 65F 55M? Not Asked   Social History Narrative    Not on file     Social Determinants of Health     Financial Resource Strain: Low Risk  (1/3/2024)    Financial Resource Strain     Within the past 12 months, have you or your family members you live with been unable to get utilities (heat, electricity) when it was really needed?: No   Food Insecurity: Low Risk  (1/3/2024)    Food Insecurity     Within the past 12 months, did you worry that your food  would run out before you got money to buy more?: No     Within the past 12 months, did the food you bought just not last and you didn t have money to get more?: No   Transportation Needs: Low Risk  (1/3/2024)    Transportation Needs     Within the past 12 months, has lack of transportation kept you from medical appointments, getting your medicines, non-medical meetings or appointments, work, or from getting things that you need?: No   Physical Activity: Not on file   Stress: Not on file   Social Connections: Not on file   Interpersonal Safety: Not on file   Housing Stability: Low Risk  (1/3/2024)    Housing Stability     Do you have housing? : Yes     Are you worried about losing your housing?: No     Reviewed with patient     FAMILY MEDICAL HISTORY:   Family History   Problem Relation Age of Onset    Heart Disease Maternal Grandmother     Cancer Maternal Grandfather     Heart Disease Paternal Grandfather     Heart Disease Mother     Heart Disease Father     Diabetes No family hx of     Coronary Artery Disease No family hx of     Hypertension No family hx of     Hyperlipidemia No family hx of     Cerebrovascular Disease No family hx of     Breast Cancer No family hx of     Colon Cancer No family hx of     Prostate Cancer No family hx of     Other Cancer No family hx of     Depression No family hx of     Anxiety Disorder No family hx of     Mental Illness No family hx of     Substance Abuse No family hx of     Anesthesia Reaction No family hx of     Asthma No family hx of     Osteoporosis No family hx of     Genetic Disorder No family hx of     Thyroid Disease No family hx of     Obesity No family hx of     Unknown/Adopted No family hx of        Reviewed with patient     PHYSICAL EXAM:   Temp  Av.9  F (36.6  C)  Min: 97.4  F (36.3  C)  Max: 98.5  F (36.9  C)      Pulse  Av.1  Min: 74  Max: 94 Resp  Av  Min: 16  Max: 18  SpO2  Av.9 %  Min: 82 %  Max: 100 %       /61 (BP Location: Left arm)    Pulse 74   Temp 97.7  F (36.5  C) (Oral)   Resp 18   Wt 63.5 kg (140 lb)   LMP  (LMP Unknown)   SpO2 98%   BMI 21.29 kg/m     Date 01/07/24 0700 - 01/08/24 0659   Shift 5783-5376 3666-7089 2405-4212 24 Hour Total   INTAKE   P.O. 350   350   Shift Total(mL/kg) 350(5.51)   350(5.51)   OUTPUT   Shift Total(mL/kg)       Weight (kg) 63.5 63.5 63.5 63.5      Admit Weight: 63.5 kg (140 lb)     GENERAL APPEARANCE: no distress,  awake  EYES: no scleral icterus, pupils equal  Lymphatics: no cervical or supraclavicular LAD  Pulmonary: lungs clear to auscultation with equal breath sounds bilaterally,  no clubbing  CV: regular rhythm, normal rate, no rub   - JVD none   - Edema : rt leg in a cast, trace dependent LE edema   GI: soft, nontender, normal bowel sounds  MS: no evidence of inflammation in joints, no muscle tenderness  : no  cazares  SKIN: no rash, warm, dry, no cyanosis  NEURO: face symmetric, no asterixis     LABS:   I have reviewed all the labs    IMAGING:  All imaging studies reviewed by me.     KIRK JONES MD

## 2024-01-08 NOTE — PROGRESS NOTES
[facility]    Medicine Progress Note - Hospitalist Service, GOLD TEAM 16    Date of Admission:  1/5/2024    Assessment & Plan      Marylee Woods is a 68 year old female admitted on 1/5/2024. She has a history of Graves disease s/p ablation , HTN, CKD< MARYLU, depression, grade 2 DCIS of L breast, GERD, osteoporosis, and MS c/b neurogenic bladder and is admitted for severe hyponatremia, also found to have new pressure ulcers.    #) Hyponatremia  Patient found to be hyponatremic to 119. Was given IV fluids and 3% saline in the [place] Department with improvement that has continued with gentle administration of normal saline. Multiple likely causes of hyponatremia but do expect true hypovolemia superimposed on high ADH state. Patient taking hydrochlorothiazide.     - Hyponatremia improving. Na 128. Continue monitoring.   - Continue on normal saline.   - Stop hydrochlorothiazide, would not restart  - Nephrology following the patient, input appreciated.    #) Recent right distal femur fracture  #) Recent left medial malleolus fracture  - Patient to be NWB for 8 weeks (until at least 1/11) but scheduled to see ortho on 1/24.    - SW consult. Continue working with PT / OT.     #) Cystitis with hematuria  #) Pyuria   Patient was started on Keflex 500 mg prior to admission and vancomycin/ceftriaxone after admission.  - follow urine culture  - stop vancomycin, continue ceftriaxone for now, would treat for 3 days unless culture negative.    #) Sacral wounds, present on admission  Patient not able to mobilize well and has been resisting turns at home.  Suspect a combination of poor mobility as well as suboptimal nutrition.  - would consult  - nutrition consult  - would benefit from rehab stay  - turn q2h    #) Lactic acidosis - resolved.     #) Acute kidney injury on chronic kidney disease - DENNISE resolved.     #) Multiple sclerosis  #) Neurogenic bladder  - Continue PTA oxybutynin      #) Hx of Graves s/p thyroid ablation  -TSH elevated to 52 on admission but has been missing doses  -repeat in 2-4 weeks  -Continue PTA levothyroxine    #) HSV prophylaxis   -Continue pta acyclovir 400mg    #)  Anemia of chronic disease  Admission Hgb of 10.2. and stable  - follow           Diet: Regular Diet Adult    DVT Prophylaxis: Pneumatic Compression Devices  Verduzco Catheter: Not present  Lines: None     Cardiac Monitoring: None  Code Status: Full Code      Clinically Significant Risk Factors         # Hyponatremia: Lowest Na = 123 mmol/L in last 2 days, will monitor as appropriate                       # Financial/Environmental Concerns:           Disposition Plan     Discussed during care rounds, social work following the patient as she needs placement.         Pankaj Cloud MD  Hospitalist Service, GOLD TEAM [address] [place] [address]  Securely message with [email or web address] (more info)  Text page via [email or web address] Paging/Directory   See signed in provider for up to date coverage information  ______________________________________________________________________    Interval History     No acute events overnight.  Patient's family at bedside.  Patient was pleasant.  No specific complaints today.  Patient denies fever, chills, diaphoresis, chest pain, cough, shortness of breath, abdominal pain, dysuria or diarrhea.    Physical Exam   Vital Signs: Temp: 98.1  F (36.7  C) Temp src: Oral BP: 105/64 Pulse: 74   Resp: 18 SpO2: 98 % O2 Device: None (Room air)    Weight: 140 lbs 0 oz    General Appearance: Alert, room air, no acute distress.   Respiratory: clear to auscultation bilaterally with good air entry   Cardiovascular: RRR. No murmurs, rubs, or gallops   GI: soft, non-tender, non-distended.  No hepatosplenomegaly or mass.  Other: Right lower extremity in cast.      Medical Decision Making             Data     I have personally reviewed the following data over the past 24 hrs:    N/A   \   N/A   / N/A     128 (L) N/A N/A /  N/A   N/A N/A N/A \       Imaging results reviewed over the past 24 hrs:   No results found for this or any previous visit (from the past 24 hour(s)).

## 2024-01-08 NOTE — CONSULTS
Nephrology Initial Consult  January 7, 2024      Marylee Woods MRN:[id number] YOB: 1955  Date of Admission:1/5/2024  Primary care provider: Carolina Pulliam  Requesting physician: Iram Vaughn MD    ASSESSMENT AND RECOMMENDATIONS:     # Hyponatremia   # DENNISE- resolved   # Cystitis   # recent rt distal femoral fracture   # Multiple sclerosis/ neurogenic bladder     Her hyponatremia is precipitated 2/2 hypovolemia along with contribution from hydrochlorothiazide and hypothyroid state (TSH of 52) urine Osm is 353 and urine Na is 46 after saline administration. Her Na has improved as expected with initial 3% followed by 0.9% NaCl administration at 75 ml/hr. She has a baseline creatinine of 0.76-0.8 mg/dl, creatinine of 1.14 on admission which has improved with saline as well.   --agree with saline administration at this time.  --agree with discontinuation of hydrochlorothiazide  --continue levothyroxine     Recommendations were communicated to primary team via this note     Dimple Mukherjee MD   Division of Renal Disease and Hypertension  [employer]  Vocera Web Console      REASON FOR CONSULT: Hyponatremia     HISTORY OF PRESENT ILLNESS:  Admitting provider and nursing notes reviewed  Marylee Woods is a 68 year old female with a history of MS, neurogenic bladder, recent rt femoral fracture, cystitis is admitted with hyponatremia. She reports of having decreased PO intake at home as she was feeling weak and had poor appetite. She reports she is feeling much better now and has some appetite now. She denies any nausea, vomiting,headaches.     PAST MEDICAL HISTORY:  Reviewed with patient on 01/07/2024     Past Medical History:   Diagnosis Date    Atypical ductal hyperplasia of left breast 02/2022    Depression     Gastro-oesophageal reflux disease     Graves disease     Multiple sclerosis (H)     Osteoporosis     Postablative hypothyroidism        Past Surgical History:   Procedure Laterality Date     C/SECTION, LOW TRANSVERSE  1992    COLONOSCOPY  2007    COLONOSCOPY N/A 1/26/2017    Procedure: COMBINED COLONOSCOPY, SINGLE OR MULTIPLE BIOPSY/POLYPECTOMY BY BIOPSY;  Surgeon: Mayo Adkins MD, MD;  Location:  GI    ESOPHAGOSCOPY, GASTROSCOPY, DUODENOSCOPY (EGD), COMBINED  1/26/2017    Dr. Adkins [facility]    ESOPHAGOSCOPY, GASTROSCOPY, DUODENOSCOPY (EGD), COMBINED N/A 1/26/2017    Procedure: COMBINED ESOPHAGOSCOPY, GASTROSCOPY, DUODENOSCOPY (EGD), BIOPSY SINGLE OR MULTIPLE;  Surgeon: Mayo Adkins MD, MD;  Location:  GI    ESOPHAGOSCOPY, GASTROSCOPY, DUODENOSCOPY (EGD), COMBINED N/A 4/20/2023    Procedure: ESOPHAGOGASTRODUODENOSCOPY, WITH BIOPSY;  Surgeon: Cristina Zuniga MD;  [facility]: [facility]    HIP SURGERY  2009    femur ortho surgery    LAPAROSCOPIC CHOLECYSTECTOMY  6/24/2014    Procedure: LAPAROSCOPIC CHOLECYSTECTOMY;  Surgeon: Randy Bailey MD;  Location:  [facility]    LUMPECTOMY BREAST Left 3/31/2022    Procedure: LEFT Radiofrequency Identification Seed-Localized Lumpectomy;  Surgeon: Amanda Liu MD;  Location: [facility] OR    OPEN REDUCTION INTERNAL FIXATION FEMUR DISTAL Left 11/1/2018    Procedure: OPEN REDUCTION INTERNAL FIXATION LEFT FEMUR DISTAL;  Surgeon: Jose Valadez MD;  Location: [facility]    OPEN REDUCTION INTERNAL FIXATION RODDING INTRAMEDULLARY TIBIA  4/14/2013    Procedure: OPEN REDUCTION INTERNAL FIXATION RODDING INTRAMEDULLARY TIBIA;;  Surgeon: Sajan Cast MD;  Location: [facility]    ORTHOPEDIC SURGERY  2009    surgery right upper femur fx near hip        MEDICATIONS:  PTA Meds  Prior to Admission medications    Medication Sig Last Dose Taking? Auth Provider Long Term End Date   acetaminophen (TYLENOL) 500 MG tablet Take 1-2 tablets (500-1,000 mg) by mouth every 8 hours as needed for mild pain or fever (greater than 101 degrees) 1/5/2024 at pm Yes Carolina Pulliam MD     acyclovir (ZOVIRAX) 400 MG tablet Take 1 tablet (400 mg) by mouth every 12 hours 1/5/2024  at pm Yes Carolina Pulliam MD Yes    Armodafinil 200 MG TABS Take 200 mg by mouth every morning 1/5/2024 at am Yes Unknown, Entered By History No    baclofen (LIORESAL) 10 MG tablet Take 10-30 mg by mouth 2 times daily Take 2 tablets (20 MG) by mouth every morning, 1 tablet (10 MG) by mouth daily in the afternoon as needed, and 3 tablets (30 MG) by mouth every evening before bedtime. 1/5/2024 at pm Yes Reported, Patient No    calcium carbonate 500 mg, elemental, (OSCAL 500) 1250 (500 Ca) MG TABS tablet Take 2 tablets by mouth at bedtime 1/5/2024 at pm Yes Reported, Patient     cephALEXin (KEFLEX) 500 MG capsule Take 1 capsule (500 mg) by mouth 2 times daily for 7 days 1/5/2024 at pm Yes Bambi Ny CNP  1/10/24   DULoxetine (CYMBALTA) 20 MG capsule Take 1 capsule (20 mg) by mouth daily Take along with 60 mg for a total of 80 mg 1/5/2024 at am Yes Carolina Pulliam MD Yes    famotidine (PEPCID) 40 MG tablet Take 1 tablet (40 mg) by mouth At Bedtime 1/5/2024 at pm Yes Carolina Pulliam MD     gabapentin (NEURONTIN) 300 MG capsule Take 1 capsule (300 mg) by mouth 2 times daily  Patient taking differently: Take 600 mg by mouth 4 times daily 1/5/2024 at pm Yes Randell Sinclair MD Yes    latanoprost (XALATAN) 0.005 % ophthalmic solution INSTILL 1 DROP INTO BOTH EYES EVERY DAY AS DIRECTED PT WILL NEED TO BE SEEN FOR FUTURE REFILLS 1/5/2024 at am Yes Reported, Patient Yes    levothyroxine (SYNTHROID/LEVOTHROID) 100 MCG tablet TAKE 1 TABLET BY MOUTH SIX TIMES A WEEK  Patient taking differently: Does not take on Sunday 1/5/2024 at am Yes Carolina Pulliam MD Yes    multivitamin w/minerals (THERA-VIT-M) tablet Take 1 tablet by mouth every evening 1/5/2024 at pm Yes Reported, Patient     oxyCODONE (ROXICODONE) 5 MG tablet Take 0.5 tablets (2.5 mg) by mouth every 6 hours as needed for moderate pain More than a month Yes Carolina Pulliam MD No    polyethylene glycol (MIRALAX) 17 GM/Dose powder Take 17 g by  mouth daily Past Week Yes Randell Sinclair MD No       Current Meds   acetaminophen  975 mg Oral Q8H    acyclovir  400 mg Oral Q12H    Baclofen  20 mg Oral QAM    Baclofen  30 mg Oral At Bedtime    calcium carbonate  600 mg Oral Daily    cefTRIAXone  1 g Intravenous Q24H    DULoxetine  80 mg Oral Daily    famotidine  20 mg Oral At Bedtime    gabapentin  300 mg Oral BID    latanoprost  1 drop Both Eyes Daily    levothyroxine  100 mcg Oral Once per day on     sodium chloride (PF)  3 mL Intracatheter Q8H    sodium chloride (PF)  3 mL Intracatheter Q8H     Infusion Meds   sodium chloride 75 mL/hr at 24 0742       ALLERGIES:    Allergies   Allergen Reactions    Bactrim [Sulfamethoxazole-Trimethoprim] Hallucination    Sulfamethizole     Amantadine      hallucinations    Budesonide     Budesonide-Formoterol Fumarate Rash       REVIEW OF SYSTEMS:  A comprehensive of systems was negative except as noted above.    SOCIAL HISTORY:   Social History     Socioeconomic History    Marital status:      Spouse name: Not on file    Number of children: Not on file    Years of education: Not on file    Highest education level: Not on file   Occupational History    Not on file   Tobacco Use    Smoking status: Every Day     Packs/day: .25     Types: Cigarettes     Last attempt to quit: 2022     Years since quittin.0    Smokeless tobacco: Never   Vaping Use    Vaping Use: Never used   Substance and Sexual Activity    Alcohol use: Yes     Alcohol/week: 0.0 standard drinks of alcohol     Comment: occasional     Drug use: No    Sexual activity: Yes     Partners: Male   Other Topics Concern    Parent/sibling w/ CABG, MI or angioplasty before 65F 55M? Not Asked   Social History Narrative    Not on file     Social Determinants of Health     Financial Resource Strain: Low Risk  (1/3/2024)    Financial Resource Strain     Within the past 12 months, have you or your family  members you live with been unable to get utilities (heat, electricity) when it was really needed?: No   Food Insecurity: Low Risk  (1/3/2024)    Food Insecurity     Within the past 12 months, did you worry that your food would run out before you got money to buy more?: No     Within the past 12 months, did the food you bought just not last and you didn t have money to get more?: No   Transportation Needs: Low Risk  (1/3/2024)    Transportation Needs     Within the past 12 months, has lack of transportation kept you from medical appointments, getting your medicines, non-medical meetings or appointments, work, or from getting things that you need?: No   Physical Activity: Not on file   Stress: Not on file   Social Connections: Not on file   Interpersonal Safety: Not on file   Housing Stability: Low Risk  (1/3/2024)    Housing Stability     Do you have housing? : Yes     Are you worried about losing your housing?: No     Reviewed with patient     FAMILY MEDICAL HISTORY:   Family History   Problem Relation Age of Onset    Heart Disease Maternal Grandmother     Cancer Maternal Grandfather     Heart Disease Paternal Grandfather     Heart Disease Mother     Heart Disease Father     Diabetes No family hx of     Coronary Artery Disease No family hx of     Hypertension No family hx of     Hyperlipidemia No family hx of     Cerebrovascular Disease No family hx of     Breast Cancer No family hx of     Colon Cancer No family hx of     Prostate Cancer No family hx of     Other Cancer No family hx of     Depression No family hx of     Anxiety Disorder No family hx of     Mental Illness No family hx of     Substance Abuse No family hx of     Anesthesia Reaction No family hx of     Asthma No family hx of     Osteoporosis No family hx of     Genetic Disorder No family hx of     Thyroid Disease No family hx of     Obesity No family hx of     Unknown/Adopted No family hx of        Reviewed with patient     PHYSICAL EXAM:   Temp  Avg:  97.9  F (36.6  C)  Min: 97.4  F (36.3  C)  Max: 98.5  F (36.9  C)      Pulse  Av.1  Min: 74  Max: 94 Resp  Av  Min: 16  Max: 18  SpO2  Av.9 %  Min: 82 %  Max: 100 %       /58 (BP Location: Left arm)   Pulse 80   Temp 98.4  F (36.9  C) (Oral)   Resp 18   Wt 63.5 kg (140 lb)   LMP  (LMP Unknown)   SpO2 (!) 82%   BMI 21.29 kg/m     Date 24 07 - 24 0659   Shift 5159-8088 7431-2847 2947-1026 24 Hour Total   INTAKE   P.O. 350   350   Shift Total(mL/kg) 350(5.51)   350(5.51)   OUTPUT   Shift Total(mL/kg)       Weight (kg) 63.5 63.5 63.5 63.5      Admit Weight: 63.5 kg (140 lb)     GENERAL APPEARANCE: no distress,  awake  EYES: no scleral icterus, pupils equal  Lymphatics: no cervical or supraclavicular LAD  Pulmonary: lungs clear to auscultation with equal breath sounds bilaterally,  no clubbing  CV: regular rhythm, normal rate, no rub   - JVD none   - Edema : rt leg in a cast, trace dependent LE edema   GI: soft, nontender, normal bowel sounds  MS: no evidence of inflammation in joints, no muscle tenderness  : no  cazares  SKIN: no rash, warm, dry, no cyanosis  NEURO: face symmetric, no asterixis     LABS:   I have reviewed the following labs:  CMP  Recent Labs   Lab 24  1837 24  1139 24  1037 24  0905 24  1659 24  0938 24  0311 24  2340 24  1205 24  1613   * 130* 130* 133*   < > 123*   < > 118* 119* 126*   POTASSIUM  --   --   --   --   --  3.5  --  4.3 4.3 3.7   CHLORIDE  --   --   --   --   --  88*  --  84* 85* 87*   CO2  --   --   --   --   --  21*  --  24 24 29   ANIONGAP  --   --   --   --   --  14  --  10 10 10   GLC  --   --   --   --   --  95  --  107* 95 90   BUN  --   --   --   --   --  20.0  --  24.3* 21.9 25.2*   CR  --   --   --   --   --  0.84  --  1.14* 0.84 1.50*   GFRESTIMATED  --   --   --   --   --  75  --  52* 75 38*   JORDON  --   --   --   --   --  9.1  --  9.2 9.4 9.6   MAG  --   --   --   --    --   --   --  1.6*  --   --    PHOS  --   --   --   --   --   --   --  3.1  --   --    PROTTOTAL  --   --   --   --   --   --   --  6.9  --   --    ALBUMIN  --   --   --   --   --   --   --  3.6  --   --    BILITOTAL  --   --   --   --   --   --   --  0.2  --   --    ALKPHOS  --   --   --   --   --   --   --  108  --   --    AST  --   --   --   --   --   --   --  36  --   --    ALT  --   --   --   --   --   --   --  17  --   --     < > = values in this interval not displayed.     CBC  Recent Labs   Lab 01/06/24  1057 01/05/24  2340 01/05/24  1205 01/03/24  1614   HGB 9.0* 10.2* 11.3* 10.9*   WBC 6.0 7.2 8.6 6.2   RBC 2.85* 3.31* 3.66* 3.53*   HCT 26.9* 30.0* 33.0* 32.0*   MCV 94 91 90 91   MCH 31.6 30.8 30.9 30.9   MCHC 33.5 34.0 34.2 34.1   RDW 14.8 15.2* 15.1* 14.9    248 264 267     INRNo lab results found in last 7 days.  ABGNo lab results found in last 7 days.   URINE STUDIES  Recent Labs   Lab Test 01/05/24  2354 01/03/24  1051 12/26/23  0345 11/18/23  1532 10/05/23  1454 03/09/23  0934 01/18/23  0759   COLOR Orange*  Orange* Yellow Yellow Yellow Yellow   < > Yellow   APPEARANCE Cloudy*  Cloudy* Cloudy* Cloudy* Slightly Cloudy* Clear   < > Clear   URINEGLC Negative  Negative Negative Negative Negative Negative   < > Negative   URINEBILI Negative  Negative Small* Negative Negative Negative   < > Negative   URINEKETONE Negative  Negative Trace* 40* 20* Negative   < > Negative   SG 1.016  1.016 1.015 1.015 1.016 1.010   < > 1.010   UBLD Moderate*  Moderate* Moderate* Moderate* Large* Trace*   < > Negative   URINEPH 5.5  5.5 6.0 5.5 5.0 6.5   < > 6.5   PROTEIN 30*  30* 30* 30* 20* Negative   < > Negative   UROBILINOGEN  --  0.2 0.2  --  0.2  --  0.2   NITRITE Negative  Negative Negative Negative Negative Negative   < > Negative   LEUKEST Large*  Large* Large* Large* Large* Large*   < > Small*   RBCU 20*  20* 5-10* 10-25* 45* None Seen   < > None Seen   WBCU >182*  >182* >100* >100* >182* 10-25*    < > 0-5    < > = values in this interval not displayed.     No lab results found.  PTH  Recent Labs   Lab Test 01/30/23  1007 08/12/21  1033   PTHI 43 96*     IRON STUDIES  Recent Labs   Lab Test 11/27/18  0933 11/02/18  0521 10/03/17  1449   IRON 60 14* 52    161* 261   IRONSAT 25 9* 20   LISSET 351* 223 312*       IMAGING:  All imaging studies reviewed by me.     Dimple Mukherjee MD

## 2024-01-08 NOTE — PLAN OF CARE
Patient is alert and oriented.  Does need help repositioning when in bed.  Right leg is in full cast.  Is using Purewick for incontinence.  WOCN saw patient today and left wound care orders.  PIV is patent and infusing fluids.   and sister were in to visit patient this afternoon/evening.  Call light is within reach.  Is able to make needs known.  Will continue with plan of care.

## 2024-01-09 LAB
HOLD SPECIMEN: NORMAL
OSMOLALITY UR: 269 MMOL/KG (ref 100–1200)
SODIUM SERPL-SCNC: 134 MMOL/L (ref 135–145)
SODIUM SERPL-SCNC: 134 MMOL/L (ref 135–145)
SODIUM UR-SCNC: 69 MMOL/L

## 2024-01-09 PROCEDURE — 99232 SBSQ HOSP IP/OBS MODERATE 35: CPT | Performed by: INTERNAL MEDICINE

## 2024-01-09 PROCEDURE — 36415 COLL VENOUS BLD VENIPUNCTURE: CPT | Performed by: INTERNAL MEDICINE

## 2024-01-09 PROCEDURE — 83935 ASSAY OF URINE OSMOLALITY: CPT | Performed by: INTERNAL MEDICINE

## 2024-01-09 PROCEDURE — 250N000013 HC RX MED GY IP 250 OP 250 PS 637

## 2024-01-09 PROCEDURE — 250N000013 HC RX MED GY IP 250 OP 250 PS 637: Performed by: STUDENT IN AN ORGANIZED HEALTH CARE EDUCATION/TRAINING PROGRAM

## 2024-01-09 PROCEDURE — 250N000013 HC RX MED GY IP 250 OP 250 PS 637: Performed by: INTERNAL MEDICINE

## 2024-01-09 PROCEDURE — 250N000011 HC RX IP 250 OP 636: Performed by: HOSPITALIST

## 2024-01-09 PROCEDURE — 120N000002 HC R&B MED SURG/OB UMMC

## 2024-01-09 PROCEDURE — 250N000011 HC RX IP 250 OP 636

## 2024-01-09 PROCEDURE — 84295 ASSAY OF SERUM SODIUM: CPT | Performed by: INTERNAL MEDICINE

## 2024-01-09 PROCEDURE — 84300 ASSAY OF URINE SODIUM: CPT | Performed by: INTERNAL MEDICINE

## 2024-01-09 RX ORDER — FAMOTIDINE 20 MG/1
40 TABLET, FILM COATED ORAL AT BEDTIME
Status: DISCONTINUED | OUTPATIENT
Start: 2024-01-09 | End: 2024-01-20 | Stop reason: HOSPADM

## 2024-01-09 RX ORDER — LEVOTHYROXINE SODIUM 100 UG/1
100 TABLET ORAL
Status: DISCONTINUED | OUTPATIENT
Start: 2024-01-10 | End: 2024-01-20 | Stop reason: HOSPADM

## 2024-01-09 RX ORDER — HYDROMORPHONE HCL IN WATER/PF 6 MG/30 ML
0.2 PATIENT CONTROLLED ANALGESIA SYRINGE INTRAVENOUS ONCE
Status: COMPLETED | OUTPATIENT
Start: 2024-01-10 | End: 2024-01-09

## 2024-01-09 RX ADMIN — DULOXETINE HYDROCHLORIDE 80 MG: 60 CAPSULE, DELAYED RELEASE ORAL at 08:13

## 2024-01-09 RX ADMIN — Medication 2.5 MG: at 20:54

## 2024-01-09 RX ADMIN — Medication 2.5 MG: at 16:17

## 2024-01-09 RX ADMIN — ACYCLOVIR 400 MG: 400 TABLET ORAL at 20:54

## 2024-01-09 RX ADMIN — GABAPENTIN 300 MG: 300 CAPSULE ORAL at 08:14

## 2024-01-09 RX ADMIN — BACLOFEN 30 MG: 20 TABLET ORAL at 21:01

## 2024-01-09 RX ADMIN — GABAPENTIN 300 MG: 300 CAPSULE ORAL at 20:54

## 2024-01-09 RX ADMIN — HYDROMORPHONE HYDROCHLORIDE 0.2 MG: 0.2 INJECTION, SOLUTION INTRAMUSCULAR; INTRAVENOUS; SUBCUTANEOUS at 23:54

## 2024-01-09 RX ADMIN — BACLOFEN 20 MG: 20 TABLET ORAL at 08:14

## 2024-01-09 RX ADMIN — LATANOPROST 1 DROP: 50 SOLUTION OPHTHALMIC at 08:23

## 2024-01-09 RX ADMIN — Medication 2.5 MG: at 09:40

## 2024-01-09 RX ADMIN — Medication 600 MG: at 12:40

## 2024-01-09 RX ADMIN — LEVOTHYROXINE SODIUM 100 MCG: 100 TABLET ORAL at 08:16

## 2024-01-09 RX ADMIN — ACETAMINOPHEN 975 MG: 325 TABLET, FILM COATED ORAL at 18:15

## 2024-01-09 RX ADMIN — ACETAMINOPHEN 975 MG: 325 TABLET, FILM COATED ORAL at 00:39

## 2024-01-09 RX ADMIN — CEFTRIAXONE SODIUM 1 G: 1 INJECTION, POWDER, FOR SOLUTION INTRAMUSCULAR; INTRAVENOUS at 08:25

## 2024-01-09 RX ADMIN — BACLOFEN 10 MG: 10 TABLET ORAL at 22:49

## 2024-01-09 RX ADMIN — OXYCODONE HYDROCHLORIDE 5 MG: 5 TABLET ORAL at 22:42

## 2024-01-09 RX ADMIN — ACETAMINOPHEN 975 MG: 325 TABLET, FILM COATED ORAL at 08:16

## 2024-01-09 RX ADMIN — FAMOTIDINE 40 MG: 20 TABLET ORAL at 21:13

## 2024-01-09 RX ADMIN — ACYCLOVIR 400 MG: 400 TABLET ORAL at 08:13

## 2024-01-09 ASSESSMENT — ACTIVITIES OF DAILY LIVING (ADL)
ADLS_ACUITY_SCORE: 53

## 2024-01-09 NOTE — PLAN OF CARE
/63 (BP Location: Left arm)   Pulse 82   Temp 98.1  F (36.7  C) (Oral)   Resp 16   Wt 63.5 kg (140 lb)   LMP  (LMP Unknown)   SpO2 95%   BMI 21.29 kg/m    Pt is A & O X 4. Repositioned from prone to supine position. Pt stated she got claustrophobic when she laid prone as she could not independently reposition herself. Pt verbalized pain. Pain managed with scheduled tylenol. Call button placed within reach, Plan of care is ongoing.

## 2024-01-09 NOTE — PROGRESS NOTES
Nephrology progress Note  January 9, 2024        ASSESSMENT AND RECOMMENDATIONS:   # Hyponatremia   # DENNISE- resolved   # Cystitis   # recent rt distal femoral fracture   # Multiple sclerosis/ neurogenic bladder     Her hyponatremia slightly worsening with continuing IV fluids. This can suggest an element of SIADH. This could have been precipitated by the pain from the fracture and nausea from antibiotics she received earlier. Her repeat urine studies off hydrochlorothiazide are more suggestive of SIADH.  -I have stopped her fluids and her sodium is improving.  -Continue to hold hydrochlorothiazide   -continue levothyroxine and adjust as appropriate  -Fluid restriction to 1 L per day. Patient agreed to that.    Recommendations were communicated to primary team via this note   We will sign off.    KIRK JONES MD   Division of Renal Disease and Hypertension  [employer]  Vocera Web Console      REASON FOR CONSULT: Hyponatremia     HISTORY OF PRESENT ILLNESS:  Admitting provider and nursing notes reviewed  Marylee Woods is a 68 year old female with a history of MS, neurogenic bladder, recent rt femoral fracture, cystitis is admitted with hyponatremia. She reports of having decreased PO intake at home as she was feeling weak and had poor appetite. She reports she is feeling much better now and has some appetite now. She denies any nausea, vomiting,headaches.     PAST MEDICAL HISTORY:  Reviewed with patient on 01/09/2024     Past Medical History:   Diagnosis Date    Atypical ductal hyperplasia of left breast 02/2022    Depression     Gastro-oesophageal reflux disease     Graves disease     Multiple sclerosis (H)     Osteoporosis     Postablative hypothyroidism        Past Surgical History:   Procedure Laterality Date    C/SECTION, LOW TRANSVERSE  1992    COLONOSCOPY  2007    COLONOSCOPY N/A 1/26/2017    Procedure: COMBINED COLONOSCOPY, SINGLE OR MULTIPLE BIOPSY/POLYPECTOMY BY BIOPSY;  Surgeon: Mayo Adkins MD, MD;   Location: [facility]    ESOPHAGOSCOPY, GASTROSCOPY, DUODENOSCOPY (EGD), COMBINED  1/26/2017    Dr. Adkins [facility]    ESOPHAGOSCOPY, GASTROSCOPY, DUODENOSCOPY (EGD), COMBINED N/A 1/26/2017    Procedure: COMBINED ESOPHAGOSCOPY, GASTROSCOPY, DUODENOSCOPY (EGD), BIOPSY SINGLE OR MULTIPLE;  Surgeon: Mayo Adkins MD, MD;  Location:  GI    ESOPHAGOSCOPY, GASTROSCOPY, DUODENOSCOPY (EGD), COMBINED N/A 4/20/2023    Procedure: ESOPHAGOGASTRODUODENOSCOPY, WITH BIOPSY;  Surgeon: Cristina Zuniga MD;  Location: [facility]    HIP SURGERY  2009    femur ortho surgery    LAPAROSCOPIC CHOLECYSTECTOMY  6/24/2014    Procedure: LAPAROSCOPIC CHOLECYSTECTOMY;  Surgeon: Randy aBiley MD;  Location: [facility]    LUMPECTOMY BREAST Left 3/31/2022    Procedure: LEFT Radiofrequency Identification Seed-Localized Lumpectomy;  Surgeon: Amanda Liu MD;  Location: [facility]    OPEN REDUCTION INTERNAL FIXATION FEMUR DISTAL Left 11/1/2018    Procedure: OPEN REDUCTION INTERNAL FIXATION LEFT FEMUR DISTAL;  Surgeon: Jose Valadez MD;  Location: [facility]    OPEN REDUCTION INTERNAL FIXATION RODDING INTRAMEDULLARY TIBIA  4/14/2013    Procedure: OPEN REDUCTION INTERNAL FIXATION RODDING INTRAMEDULLARY TIBIA;;  Surgeon: Sajan Cast MD;  Location: [facility]    ORTHOPEDIC SURGERY  2009    surgery right upper femur fx near hip        MEDICATIONS:  PTA Meds  Prior to Admission medications    Medication Sig Last Dose Taking? Auth Provider Long Term End Date   acetaminophen (TYLENOL) 500 MG tablet Take 1-2 tablets (500-1,000 mg) by mouth every 8 hours as needed for mild pain or fever (greater than 101 degrees) 1/5/2024 at pm Yes Carolina Pulliam MD     acyclovir (ZOVIRAX) 400 MG tablet Take 1 tablet (400 mg) by mouth every 12 hours 1/5/2024 at pm Yes Carolina Pulliam MD Yes    Armodafinil 200 MG TABS Take 200 mg by mouth every morning 1/5/2024 at am Yes Unknown, Entered By History No    baclofen (LIORESAL) 10 MG tablet Take 10-30 mg by  mouth 2 times daily Take 2 tablets (20 MG) by mouth every morning, 1 tablet (10 MG) by mouth daily in the afternoon as needed, and 3 tablets (30 MG) by mouth every evening before bedtime. 1/5/2024 at pm Yes Reported, Patient No    calcium carbonate 500 mg, elemental, (OSCAL 500) 1250 (500 Ca) MG TABS tablet Take 2 tablets by mouth at bedtime 1/5/2024 at pm Yes Reported, Patient     cephALEXin (KEFLEX) 500 MG capsule Take 1 capsule (500 mg) by mouth 2 times daily for 7 days 1/5/2024 at pm Yes Bambi Ny CNP  1/10/24   DULoxetine (CYMBALTA) 20 MG capsule Take 1 capsule (20 mg) by mouth daily Take along with 60 mg for a total of 80 mg 1/5/2024 at am Yes Carolina Pulliam MD Yes    famotidine (PEPCID) 40 MG tablet Take 1 tablet (40 mg) by mouth At Bedtime 1/5/2024 at pm Yes Carolina Pulliam MD     gabapentin (NEURONTIN) 300 MG capsule Take 1 capsule (300 mg) by mouth 2 times daily  Patient taking differently: Take 600 mg by mouth 4 times daily 1/5/2024 at pm Yes Randell Sinclair MD Yes    latanoprost (XALATAN) 0.005 % ophthalmic solution INSTILL 1 DROP INTO BOTH EYES EVERY DAY AS DIRECTED PT WILL NEED TO BE SEEN FOR FUTURE REFILLS 1/5/2024 at am Yes Reported, Patient Yes    levothyroxine (SYNTHROID/LEVOTHROID) 100 MCG tablet TAKE 1 TABLET BY MOUTH SIX TIMES A WEEK  Patient taking differently: Does not take on Sunday 1/5/2024 at am Yes Carolina Pulliam MD Yes    multivitamin w/minerals (THERA-VIT-M) tablet Take 1 tablet by mouth every evening 1/5/2024 at pm Yes Reported, Patient     oxyCODONE (ROXICODONE) 5 MG tablet Take 0.5 tablets (2.5 mg) by mouth every 6 hours as needed for moderate pain More than a month Yes Carolina Pulliam MD No    polyethylene glycol (MIRALAX) 17 GM/Dose powder Take 17 g by mouth daily Past Week Yes Randell Sinclair MD No       Current Meds   acetaminophen  975 mg Oral Q8H    acyclovir  400 mg Oral Q12H    Baclofen  20 mg Oral QAM    Baclofen  30 mg Oral At Bedtime     calcium carbonate  600 mg Oral Daily    cefTRIAXone  1 g Intravenous Q24H    DULoxetine  80 mg Oral Daily    famotidine  20 mg Oral At Bedtime    gabapentin  300 mg Oral BID    latanoprost  1 drop Both Eyes Daily    levothyroxine  100 mcg Oral Once per day on     sodium chloride (PF)  3 mL Intracatheter Q8H    sodium chloride (PF)  3 mL Intracatheter Q8H     Infusion Meds        ALLERGIES:    Allergies   Allergen Reactions    Bactrim [Sulfamethoxazole-Trimethoprim] Hallucination    Sulfamethizole     Amantadine      hallucinations    Budesonide     Budesonide-Formoterol Fumarate Rash       REVIEW OF SYSTEMS:  A comprehensive of systems was negative except as noted above.    SOCIAL HISTORY:   Social History     Socioeconomic History    Marital status:      Spouse name: Not on file    Number of children: Not on file    Years of education: Not on file    Highest education level: Not on file   Occupational History    Not on file   Tobacco Use    Smoking status: Every Day     Packs/day: .25     Types: Cigarettes     Last attempt to quit: 2022     Years since quittin.0    Smokeless tobacco: Never   Vaping Use    Vaping Use: Never used   Substance and Sexual Activity    Alcohol use: Yes     Alcohol/week: 0.0 standard drinks of alcohol     Comment: occasional     Drug use: No    Sexual activity: Yes     Partners: Male   Other Topics Concern    Parent/sibling w/ CABG, MI or angioplasty before 65F 55M? Not Asked   Social History Narrative    Not on file     Social Determinants of Health     Financial Resource Strain: Low Risk  (1/3/2024)    Financial Resource Strain     Within the past 12 months, have you or your family members you live with been unable to get utilities (heat, electricity) when it was really needed?: No   Food Insecurity: Low Risk  (1/3/2024)    Food Insecurity     Within the past 12 months, did you worry that your food would run out before you  got money to buy more?: No     Within the past 12 months, did the food you bought just not last and you didn t have money to get more?: No   Transportation Needs: Low Risk  (1/3/2024)    Transportation Needs     Within the past 12 months, has lack of transportation kept you from medical appointments, getting your medicines, non-medical meetings or appointments, work, or from getting things that you need?: No   Physical Activity: Not on file   Stress: Not on file   Social Connections: Not on file   Interpersonal Safety: Not on file   Housing Stability: Low Risk  (1/3/2024)    Housing Stability     Do you have housing? : Yes     Are you worried about losing your housing?: No     Reviewed with patient     FAMILY MEDICAL HISTORY:   Family History   Problem Relation Age of Onset    Heart Disease Maternal Grandmother     Cancer Maternal Grandfather     Heart Disease Paternal Grandfather     Heart Disease Mother     Heart Disease Father     Diabetes No family hx of     Coronary Artery Disease No family hx of     Hypertension No family hx of     Hyperlipidemia No family hx of     Cerebrovascular Disease No family hx of     Breast Cancer No family hx of     Colon Cancer No family hx of     Prostate Cancer No family hx of     Other Cancer No family hx of     Depression No family hx of     Anxiety Disorder No family hx of     Mental Illness No family hx of     Substance Abuse No family hx of     Anesthesia Reaction No family hx of     Asthma No family hx of     Osteoporosis No family hx of     Genetic Disorder No family hx of     Thyroid Disease No family hx of     Obesity No family hx of     Unknown/Adopted No family hx of        Reviewed with patient     PHYSICAL EXAM:   Temp  Av.9  F (36.6  C)  Min: 97.4  F (36.3  C)  Max: 98.5  F (36.9  C)      Pulse  Av.1  Min: 74  Max: 94 Resp  Av  Min: 16  Max: 18  SpO2  Av.9 %  Min: 82 %  Max: 100 %       /69   Pulse 89   Temp 98.5  F (36.9  C) (Oral)    Resp 16   Wt 63.5 kg (140 lb)   LMP  (LMP Unknown)   SpO2 98%   BMI 21.29 kg/m     Date 01/07/24 0700 - 01/08/24 0659   Shift 6273-1613 5096-4473 0746-2672 24 Hour Total   INTAKE   P.O. 350   350   Shift Total(mL/kg) 350(5.51)   350(5.51)   OUTPUT   Shift Total(mL/kg)       Weight (kg) 63.5 63.5 63.5 63.5      Admit Weight: 63.5 kg (140 lb)     GENERAL APPEARANCE: no distress,  awake  EYES: no scleral icterus, pupils equal  Lymphatics: no cervical or supraclavicular LAD  Pulmonary: lungs clear to auscultation with equal breath sounds bilaterally,  no clubbing  CV: regular rhythm, normal rate, no rub   - JVD none   - Edema : rt leg in a cast, trace dependent LE edema   GI: soft, nontender, normal bowel sounds  MS: no evidence of inflammation in joints, no muscle tenderness  : no  cazares  SKIN: no rash, warm, dry, no cyanosis  NEURO: face symmetric, no asterixis     LABS:   I have reviewed all the labs    IMAGING:  All imaging studies reviewed by me.     KIRK JONES MD

## 2024-01-09 NOTE — PLAN OF CARE
[id number]  VS: VSS   O2: >90% on RA   Output: Incontinent of urine x1   Last BM: 1/8; incontinent of formed BM   Activity: 1-2A to reposition in bed   Up for meals? NA   Skin: Coccyx, L IT, L heel wounds   Pain: Managed with 2.5mg oxycodone on previous shift.    CMS: All extremities warm, brisk cap refil   Dressing: Wound dressings CDI   Diet: Regular   LDA: L PIV SL   Plan: TBD; CC involved for disposition   Additional Info:  Hard cast intact LLE

## 2024-01-09 NOTE — PLAN OF CARE
Goal Outcome Evaluation:    A&Ox4, VSS    Sacral mepilex is CDI. Square mepilex on left posterior leg changed d/t soiling      Per orthopedics patient is to be non-weight bearing of her right leg until cleared. First potential date for x-rays and cast removal is 1/11 (however not guaranteed that cast will be removed). Otherwise patient was scheduled to see  Dr. Em out patient in two weeks.     Cast is visibly soiled with urine and stool. Hospitalist and ortho both notified of this. Previous nursing notes also mention this finding.     Wound present on sacrum and IT. Left heel in a boot for protection as well as covered w/ mepilex.       Plan: Na still low, continue to trend and provide continuous fluids. Also potential discharge to TCU when medically stable.

## 2024-01-09 NOTE — PROGRESS NOTES
Care Management Follow Up    Length of Stay (days): 3    Expected Discharge Date:TBD     Concerns to be Addressed: discharge planning     Patient plan of care discussed at interdisciplinary rounds: Yes    Anticipated Discharge Disposition: Transitional Care     Anticipated Discharge Services:  Rehab  Anticipated Discharge DME:      Patient/family educated on Medicare website which has current facility and service quality ratings: yes  Education Provided on the Discharge Plan: Yes  Patient/Family in Agreement with the Plan: yes    Referrals Placed by CM/SW: External Care Coordination  Private pay costs discussed:  Not at this time    Additional Information:  This RNCC spoke with patient's sister who is very concerned regarding patients current condition. States that she feels patient was discharged to early last time she was hospitalized and is too much work for spouse at home as he stills works. Inquired about PCA services through insurance, informed her that Medicare does not cover PCA services, would need to be on an elderly waiver and would need to qualify for MA. Sister, Matt Estrella would like to talk with ortho doc and medicine doc and would like her MS doctor involved also, is concerned about patient not being able to use her left arm, informed Matt Estrella that there is an order in for occupational therapy and I will reachout to medicine team to give her a call tomorrow and that we are still awaiting to hear from Ortho team.    Care Management will continue to follow.    Nydia BREAUXN RN CCM  RN Care Coordinator 5 MS and [address] [place] [address]  [email or web address]   Office:   [phone]   Pager: [phone]     Weekend [place] Pager:5 ortho,5 Med/Surg & WB ED  [phone]   Weekend 6MS, 8A, 10ICU- Pager: [phone]     For weekend RN care coordinator needs (home discharge with needs including home care, assisted living facility returns, durable  medical equip, IV antibiotics  RNCC Weekend [place] Pager:5 ortho,5 Med/Surg & [facility] ED     [facility] Weekend 6MS, 8A, 10ICU- Pager: [phone]

## 2024-01-09 NOTE — PROGRESS NOTES
[facility]    Medicine Progress Note - Hospitalist Service, GOLD TEAM 16    Date of Admission:  1/5/2024    Assessment & Plan      Marylee Woods is a 68 year old female admitted on 1/5/2024. She has a history of Graves disease s/p ablation , HTN, CKD< MARYLU, depression, grade 2 DCIS of L breast, GERD, osteoporosis, and MS c/b neurogenic bladder and is admitted for severe hyponatremia, also found to have new pressure ulcers.    # Hyponatremia  - On admission, patient found to be hyponatremic to 119.   - Was given IV fluids and 3% saline in the Emegency Department with improvement that has continued with gentle administration of normal saline.   -Initially, hyponatremia thought to be due to volume depletion for which patient required normal saline infusion.    -Subsequently became hyponatremic again with culprit being SIADH, hence, patient had to be placed on fluid restriction with subsequent improvement in her sodium at this point.  HCTZ discontinued.   Continue fluid restriction   Appreciate neprhology recs   Continue q12h Na checks      # Recent right distal femur fracture  # Recent left medial malleolus fracture  - Patient to be NWB for 8 weeks (until at least 1/11) but scheduled to see ortho on 1/24.    - Ortho reconsulted, and per discussion with RN, she's to remain NWB in RLE until 1/11/24 then ?xray afterwards   - SW consult. Continue working with PT / OT.     # Cystitis with hematuria  # Pyuria   - Patient was started on Keflex 500 mg prior to admission and vancomycin/ceftriaxone after admission.  - She recieived x4 days of CTX. Last dose of CTX 1/9/24.   - Urine cx this admission was negative for bacterial growth.     # Sacral wounds, present on admission  - Patient not able to mobilize well and has been resisting turns at home.    - Suspect a combination of poor mobility as well as suboptimal nutrition.  - would consult  - nutrition consult  - would benefit from  rehab stay  - turn q2h    # Lactic acidosis - resolved.     # Acute kidney injury on chronic kidney disease - DENNISE resolved.     # Multiple sclerosis  # Neurogenic bladder  - Continue PTA oxybutynin     # Hx of Graves s/p thyroid ablation  -TSH elevated to 52 on admission but has been missing doses  -repeat in 2-4 weeks  -Continue PTA levothyroxine    # HSV prophylaxis   -Continue pta acyclovir 400mg    #  Anemia of chronic disease  Admission Hgb of 10.2. and stable  - follow           Diet: Regular Diet Adult  Snacks/Supplements Adult: Ensure Enlive; Between Meals  Fluid restriction 1000 ML FLUID    DVT Prophylaxis: Pneumatic Compression Devices  Verduzco Catheter: Not present  Lines: None     Cardiac Monitoring: None  Code Status: Full Code      Clinically Significant Risk Factors         # Hyponatremia: Lowest Na = 128 mmol/L in last 2 days, will monitor as appropriate                  # Moderate Malnutrition: based on nutrition assessment, PRESENT ON ADMISSION     # Financial/Environmental Concerns: none         Disposition Plan  Pending further recs from ortho with respect to weightbearing status, then PT/OT consult afterwards          GALI TRUONG MD  Hospitalist Service, GOLD TEAM 16  [facility]  Securely message with [employer] (more info)  Text page via [facility] Paging/Directory   See signed in provider for up to date coverage information  ______________________________________________________________________    Interval History   -Afebrile and HDS  -Na is improving   -Denies any acute complaints today.     Physical Exam   Vital Signs: Temp: 98.5  F (36.9  C) Temp src: Oral BP: 130/69 Pulse: 89   Resp: 16 SpO2: 98 % O2 Device: None (Room air)    Weight: 140 lbs 0 oz    General Appearance: Alert, room air, no acute distress.   Respiratory: clear to auscultation bilaterally with good air entry   Cardiovascular: RRR. No murmurs, rubs, or gallops   GI: soft, non-tender,  non-distended.  No hepatosplenomegaly or mass.  Other: Right lower extremity in hard cast.      Medical Decision Making             Data     I have personally reviewed the following data over the past 24 hrs:    N/A  \   N/A   / N/A     134 (L) N/A N/A /  N/A   N/A N/A N/A \       Imaging results reviewed over the past 24 hrs:   No results found for this or any previous visit (from the past 24 hour(s)).

## 2024-01-10 ENCOUNTER — TELEPHONE (OUTPATIENT)
Dept: FAMILY MEDICINE | Facility: CLINIC | Age: 69
End: 2024-01-10

## 2024-01-10 ENCOUNTER — APPOINTMENT (OUTPATIENT)
Dept: GENERAL RADIOLOGY | Facility: CLINIC | Age: 69
DRG: 644 | End: 2024-01-10
Payer: MEDICARE

## 2024-01-10 LAB — SODIUM SERPL-SCNC: 138 MMOL/L (ref 135–145)

## 2024-01-10 PROCEDURE — 250N000013 HC RX MED GY IP 250 OP 250 PS 637: Performed by: INTERNAL MEDICINE

## 2024-01-10 PROCEDURE — 36415 COLL VENOUS BLD VENIPUNCTURE: CPT | Performed by: INTERNAL MEDICINE

## 2024-01-10 PROCEDURE — 250N000011 HC RX IP 250 OP 636: Performed by: INTERNAL MEDICINE

## 2024-01-10 PROCEDURE — 250N000013 HC RX MED GY IP 250 OP 250 PS 637: Performed by: STUDENT IN AN ORGANIZED HEALTH CARE EDUCATION/TRAINING PROGRAM

## 2024-01-10 PROCEDURE — 84295 ASSAY OF SERUM SODIUM: CPT | Performed by: INTERNAL MEDICINE

## 2024-01-10 PROCEDURE — 120N000002 HC R&B MED SURG/OB UMMC

## 2024-01-10 PROCEDURE — 250N000013 HC RX MED GY IP 250 OP 250 PS 637

## 2024-01-10 PROCEDURE — 99232 SBSQ HOSP IP/OBS MODERATE 35: CPT | Performed by: INTERNAL MEDICINE

## 2024-01-10 PROCEDURE — 73560 X-RAY EXAM OF KNEE 1 OR 2: CPT | Mod: RT

## 2024-01-10 RX ORDER — ENOXAPARIN SODIUM 100 MG/ML
40 INJECTION SUBCUTANEOUS EVERY 24 HOURS
Status: DISCONTINUED | OUTPATIENT
Start: 2024-01-10 | End: 2024-01-20 | Stop reason: HOSPADM

## 2024-01-10 RX ORDER — DIAZEPAM 10 MG/2ML
2.5 INJECTION, SOLUTION INTRAMUSCULAR; INTRAVENOUS ONCE
Status: COMPLETED | OUTPATIENT
Start: 2024-01-10 | End: 2024-01-10

## 2024-01-10 RX ADMIN — ENOXAPARIN SODIUM 40 MG: 40 INJECTION SUBCUTANEOUS at 09:10

## 2024-01-10 RX ADMIN — BACLOFEN 20 MG: 20 TABLET ORAL at 09:10

## 2024-01-10 RX ADMIN — GABAPENTIN 300 MG: 300 CAPSULE ORAL at 19:45

## 2024-01-10 RX ADMIN — ACETAMINOPHEN 975 MG: 325 TABLET, FILM COATED ORAL at 09:08

## 2024-01-10 RX ADMIN — FAMOTIDINE 40 MG: 20 TABLET ORAL at 22:15

## 2024-01-10 RX ADMIN — DULOXETINE HYDROCHLORIDE 80 MG: 60 CAPSULE, DELAYED RELEASE ORAL at 09:09

## 2024-01-10 RX ADMIN — GABAPENTIN 300 MG: 300 CAPSULE ORAL at 09:08

## 2024-01-10 RX ADMIN — BACLOFEN 30 MG: 20 TABLET ORAL at 22:15

## 2024-01-10 RX ADMIN — OXYCODONE HYDROCHLORIDE 5 MG: 5 TABLET ORAL at 12:53

## 2024-01-10 RX ADMIN — ACETAMINOPHEN 975 MG: 325 TABLET, FILM COATED ORAL at 00:16

## 2024-01-10 RX ADMIN — ACETAMINOPHEN 975 MG: 325 TABLET, FILM COATED ORAL at 16:59

## 2024-01-10 RX ADMIN — Medication 600 MG: at 09:09

## 2024-01-10 RX ADMIN — ACYCLOVIR 400 MG: 400 TABLET ORAL at 09:09

## 2024-01-10 RX ADMIN — OXYCODONE HYDROCHLORIDE 5 MG: 5 TABLET ORAL at 16:59

## 2024-01-10 RX ADMIN — BACLOFEN 10 MG: 10 TABLET ORAL at 16:59

## 2024-01-10 RX ADMIN — ACYCLOVIR 400 MG: 400 TABLET ORAL at 19:45

## 2024-01-10 ASSESSMENT — ACTIVITIES OF DAILY LIVING (ADL)
ADLS_ACUITY_SCORE: 53

## 2024-01-10 NOTE — TELEPHONE ENCOUNTER
Received the following message on 1/5/24    Hello, can we communicate the wound care instructions to home health.  I would also like them to increase frequency of visits to twice per week.  I ordered an airflow mattress.  Patient should also be participating in PT as well as OT.  Would have OT consider brace for left hand.   ---    Pt is currently admitted.    Will hold for discharge.    Dasia LOUIS RN  [facility]

## 2024-01-10 NOTE — PLAN OF CARE
Goal Outcome Evaluation:    Alert and oriented x4  Patient repositioned Q2.   Hard cast on left leg. Visibly soiled with bowel and stool. Non weightbearing at this time.   1000 ml fluid restriction  Sodium improving. 138 @ 0600  Pressure ulcers on left heel and sacrum at admission. Mepilex applied to sacrum and left heel. Dressing change due every 3 days or when soiled. Not changed this shift.      and friend at bedside.   Call light within reach, able to make needs known.     Plan: TCU when medically cleared.

## 2024-01-10 NOTE — PROGRESS NOTES
[facility]    Medicine Progress Note - Hospitalist Service, GOLD TEAM 16    Date of Admission:  1/5/2024    Assessment & Plan      Marylee Woods is a 68 year old female admitted on 1/5/2024. She has a history of Graves disease s/p ablation , HTN, CKD< MARYLU, depression, grade 2 DCIS of L breast, GERD, osteoporosis, and Multiple Sclerosis c/b neurogenic bladder and is admitted for severe hyponatremia, also found to have new pressure ulcers present on admission.     # Hyponatremia - resolved   - On admission, patient found to be hyponatremic to 119.   - Was given IV fluids and 3% saline in the Emegency Department with improvement that has continued with gentle administration of normal saline.   -Initially, hyponatremia thought to be due to volume depletion for which patient required normal saline infusion.    -Subsequently became hyponatremic again with culprit being SIADH, hence, patient had to be placed on fluid restriction with subsequent improvement in her sodium at this point.  -Na has since returned to normal levels   HCTZ discontinued.   Continue fluid restriction   Okay for Q48H BMP checks for now      # Recent right distal femur fracture  # Recent left medial malleolus fracture  - Patient to be NWB for 8 weeks (until at least 1/11) but scheduled to see ortho on 1/24.    - Ortho reconsulted, and per discussion with RN, she's to remain NWB in RLE until 1/11/24 then ?xray afterwards   - SW consult. Continue working with PT / OT.     # Cystitis with hematuria  # Pyuria   - Patient was started on Keflex 500 mg prior to admission and vancomycin/ceftriaxone after admission.  - She recieived x4 days of CTX. Last dose of CTX 1/9/24.   - Urine cx this admission was negative for bacterial growth.     # Sacral wounds, present on admission  - Patient not able to mobilize well and has been resisting turns at home.    - Suspect a combination of poor mobility as well as suboptimal  nutrition.  - would consult  - nutrition consult  - would benefit from rehab stay  - turn q2h    # Lactic acidosis - resolved.     # Acute kidney injury on chronic kidney disease - DENNISE resolved.     # Multiple sclerosis  # Neurogenic bladder  -Of note, she has received Lemtrada in 2017 & 2019.  Continue baclofen 20 mg every morning, 30 mg every afternoon.    # Hx of Graves s/p thyroid ablation  -TSH elevated to 52 on admission but has been missing doses  -repeat in 2-4 weeks  -Continue PTA levothyroxine    # HSV prophylaxis   -Continue pta acyclovir 400mg    #  Anemia of chronic disease  Admission Hgb of 10.2. and stable  - follow           Diet: Regular Diet Adult  Snacks/Supplements Adult: Ensure Enlive; Between Meals  Fluid restriction 1000 ML FLUID    DVT Prophylaxis: Pneumatic Compression Devices  Verduzco Catheter: Not present  Lines: None     Cardiac Monitoring: None  Code Status: Full Code      Clinically Significant Risk Factors                          # Moderate Malnutrition: based on nutrition assessment      # Financial/Environmental Concerns: none         Disposition Plan  Pending further recs from ortho with respect to weightbearing status, then PT/OT consult afterwards          GALI TRUONG MD  Hospitalist Service, GOLD TEAM [address] [place] [address]  Securely message with Vocera (more info)  Text page via [facility] Paging/Directory   See signed in provider for up to date coverage information  ______________________________________________________________________    Interval History   -Afebrile and HDS  -Na has since resolved.  -Denies any acute complaints today.   -Attempted to call patient's sister, went to voicemail.    Physical Exam   Vital Signs: Temp: 97.9  F (36.6  C) Temp src: Oral BP: 128/71 Pulse: 83   Resp: 18 SpO2: 95 % O2 Device: None (Room air)    Weight: 140 lbs 0 oz    General Appearance: Alert, room air, no acute distress.   Respiratory: clear to  auscultation bilaterally with good air entry   Cardiovascular: RRR. No murmurs, rubs, or gallops   GI: soft, non-tender, non-distended.  No hepatosplenomegaly or mass.  Other: Right lower extremity in hard cast.  Patient able to move bilateral toes.  Normal plantarflexion dorsiflexion bilaterally.    Medical Decision Making             Data     I have personally reviewed the following data over the past 24 hrs:    N/A  \   N/A   / N/A     138 N/A N/A /  N/A   N/A N/A N/A \       Imaging results reviewed over the past 24 hrs:   No results found for this or any previous visit (from the past 24 hour(s)).

## 2024-01-10 NOTE — PLAN OF CARE
Goal Outcome Evaluation:      Plan of Care Reviewed With: patient    Overall Patient Progress: no changeOverall Patient Progress: no change    At around 2315, pt began to c/o severe 10/10 pain on her R hip. Pt stated it felt spasmy, felt like something snapped, and felt like a new pain. Provider notified and was given orders for 1x IV dilaudid. Pt subsequently felt relief and was able to fall asleep comfortably.   No other events overnight.

## 2024-01-10 NOTE — PLAN OF CARE
Goal Outcome Evaluation:    No acute changes today     Sacral mepilex changed d/t incontinence this AM.     Heel in boot for protection     Na redraw in for 1800 but not yet collected. Oncoming nurse aware.     Pt on 1L FR

## 2024-01-10 NOTE — PROVIDER NOTIFICATION
Pt has not had BM in 2 days. Writer offered ASHWIN Baugh, pt stated this gives her diarrhea and Miralax is better for her, which is not ordered. Requested Miralax.     Pt also asked about CPAP and if she can be on it in hospital. Spouse corrected her to BiPAP and mentioned her being on it in ICU and that they had asked about it a few times. Pt does have dx of MARYLU, no current orders for CPAP/BiPAP or flowsheets from respiratory. Asking if she can be on CPAP or?? O2 sats currently at 96+ on room air.     Messaged Shemar Jensen on call via [employer].

## 2024-01-11 ENCOUNTER — APPOINTMENT (OUTPATIENT)
Dept: OCCUPATIONAL THERAPY | Facility: CLINIC | Age: 69
DRG: 644 | End: 2024-01-11
Attending: INTERNAL MEDICINE
Payer: MEDICARE

## 2024-01-11 LAB
ANION GAP SERPL CALCULATED.3IONS-SCNC: 4 MMOL/L (ref 7–15)
BUN SERPL-MCNC: 11.5 MG/DL (ref 8–23)
CALCIUM SERPL-MCNC: 8.8 MG/DL (ref 8.8–10.2)
CHLORIDE SERPL-SCNC: 105 MMOL/L (ref 98–107)
CREAT SERPL-MCNC: 0.52 MG/DL (ref 0.51–0.95)
DEPRECATED HCO3 PLAS-SCNC: 28 MMOL/L (ref 22–29)
EGFRCR SERPLBLD CKD-EPI 2021: >90 ML/MIN/1.73M2
GLUCOSE SERPL-MCNC: 93 MG/DL (ref 70–99)
PLATELET # BLD AUTO: 214 10E3/UL (ref 150–450)
POTASSIUM SERPL-SCNC: 3.8 MMOL/L (ref 3.4–5.3)
SODIUM SERPL-SCNC: 137 MMOL/L (ref 135–145)

## 2024-01-11 PROCEDURE — 36415 COLL VENOUS BLD VENIPUNCTURE: CPT | Performed by: INTERNAL MEDICINE

## 2024-01-11 PROCEDURE — 80048 BASIC METABOLIC PNL TOTAL CA: CPT | Performed by: INTERNAL MEDICINE

## 2024-01-11 PROCEDURE — 85049 AUTOMATED PLATELET COUNT: CPT | Performed by: INTERNAL MEDICINE

## 2024-01-11 PROCEDURE — 99232 SBSQ HOSP IP/OBS MODERATE 35: CPT | Performed by: INTERNAL MEDICINE

## 2024-01-11 PROCEDURE — 120N000002 HC R&B MED SURG/OB UMMC

## 2024-01-11 PROCEDURE — 250N000013 HC RX MED GY IP 250 OP 250 PS 637

## 2024-01-11 PROCEDURE — 97166 OT EVAL MOD COMPLEX 45 MIN: CPT | Mod: GO

## 2024-01-11 PROCEDURE — 250N000013 HC RX MED GY IP 250 OP 250 PS 637: Performed by: INTERNAL MEDICINE

## 2024-01-11 PROCEDURE — 250N000013 HC RX MED GY IP 250 OP 250 PS 637: Performed by: STUDENT IN AN ORGANIZED HEALTH CARE EDUCATION/TRAINING PROGRAM

## 2024-01-11 PROCEDURE — 250N000011 HC RX IP 250 OP 636: Performed by: INTERNAL MEDICINE

## 2024-01-11 PROCEDURE — 97530 THERAPEUTIC ACTIVITIES: CPT | Mod: GO

## 2024-01-11 RX ADMIN — BACLOFEN 30 MG: 20 TABLET ORAL at 22:11

## 2024-01-11 RX ADMIN — ACYCLOVIR 400 MG: 400 TABLET ORAL at 08:52

## 2024-01-11 RX ADMIN — ACETAMINOPHEN 975 MG: 325 TABLET, FILM COATED ORAL at 09:07

## 2024-01-11 RX ADMIN — OXYCODONE HYDROCHLORIDE 5 MG: 5 TABLET ORAL at 23:00

## 2024-01-11 RX ADMIN — ENOXAPARIN SODIUM 40 MG: 40 INJECTION SUBCUTANEOUS at 08:53

## 2024-01-11 RX ADMIN — FAMOTIDINE 40 MG: 20 TABLET ORAL at 22:11

## 2024-01-11 RX ADMIN — OXYCODONE HYDROCHLORIDE 5 MG: 5 TABLET ORAL at 16:05

## 2024-01-11 RX ADMIN — ACETAMINOPHEN 975 MG: 325 TABLET, FILM COATED ORAL at 01:20

## 2024-01-11 RX ADMIN — ACETAMINOPHEN 975 MG: 325 TABLET, FILM COATED ORAL at 18:14

## 2024-01-11 RX ADMIN — BACLOFEN 20 MG: 20 TABLET ORAL at 08:56

## 2024-01-11 RX ADMIN — LATANOPROST 1 DROP: 50 SOLUTION OPHTHALMIC at 08:53

## 2024-01-11 RX ADMIN — DULOXETINE HYDROCHLORIDE 80 MG: 60 CAPSULE, DELAYED RELEASE ORAL at 08:53

## 2024-01-11 RX ADMIN — GABAPENTIN 300 MG: 300 CAPSULE ORAL at 08:52

## 2024-01-11 RX ADMIN — ACYCLOVIR 400 MG: 400 TABLET ORAL at 20:21

## 2024-01-11 RX ADMIN — GABAPENTIN 300 MG: 300 CAPSULE ORAL at 20:21

## 2024-01-11 RX ADMIN — Medication 600 MG: at 08:51

## 2024-01-11 ASSESSMENT — ACTIVITIES OF DAILY LIVING (ADL)
ADLS_ACUITY_SCORE: 57

## 2024-01-11 NOTE — PLAN OF CARE
Pt here for hyponatremia and has MS.     Goal Outcome Evaluation:      Plan of Care Reviewed With: patient    Overall Patient Progress: no change    Outcome Evaluation: Pain controlled, saw therapy today       VS: Temp: 98.1  F (36.7  C) Temp src: Oral BP: 112/66 Pulse: 87   Resp: 18 SpO2: 95 % O2 Device: None (Room air)      O2: RA   Output: Per Purewick   Last BM: 1/11   Activity: Ax2, wheelchair bound at baseline, limited use of BLEs, R leg in cast. Turn q2h but occasionally refuses. Seen by therapy today, was up in motorized chair for meal.    Skin: Pressure injuries to coccyx and L IT, wound to L heel and L IT mostly healed   Pain: Moderate, received oxycodone at 1605   Neuro: A&O x4   Dressing: Mepilex, changed q3d or when soiled, due for change tomorrow.    Diet: Regular   LDA: L PIV SL   Equipment: Lift   Plan: Able to discharge to TCU when stable   Additional Info:

## 2024-01-11 NOTE — PLAN OF CARE
Goal Outcome Evaluation:      Plan of Care Reviewed With: Patient    Overall Patient Progress: Improving    Outcome Evaluation: Pt slept well overnight, Q2 hour turns. Complained of muscle spams, managed with an extra dose of PRN baclofen. Denies sob or chest pain.

## 2024-01-11 NOTE — PROGRESS NOTES
Care Management Follow Up    Length of Stay (days): 5    Expected Discharge Date:       Concerns to be Addressed: discharge planning     Patient plan of care discussed at interdisciplinary rounds: Yes    Anticipated Discharge Disposition: Transitional Care vs. Home vs. respite     Anticipated Discharge Services:  Home care  Anticipated Discharge DME:  TBD    Patient/family educated on Medicare website which has current facility and service quality ratings: yes  Education Provided on the Discharge Plan: Yes  Patient/Family in Agreement with the Plan: yes    Referrals Placed by CM/SW: External Care Coordination  Private pay costs discussed:  Private pay home care, respite stay.     Additional Information:  RNCC reached out to pt's spouse r/t discharge planning. Per IDT rounds, pt is not currently a candidate for TCU as she is NWB to BLE at least for two more weeks until clinic follow up. IDT discussed alternate needs that would qualify pt for TCU coverage, but pt does not have any qualifying conditions. Team further discussed alternative discharge dispositions such as respite care and private in home care. Both would be out of pocket cost and not covered by insurance.     Writer relayed this information with pt's spouse Manuel. Initially, Manuel was very frustrated stating that pt has not received any therapies in hospital and that pt needs it for core strengthening and left hand exercise. Additionally, Manuel expressed frustration that TCU referrals had not been sent. Writer explained that without PT/OT recommendations, most facilities would not take as insurance would not cover cost. Writer did agree to send referrals in the case that there is an accepting facility. SW sent referrals to pt's preference sites.     Writer paged PT requesting call back as Manuel would like to speak with their team about his concerns. Awaiting call back at time of this note.     Care management will follow up with Manuel tomorrow once TCU  referrals are reviewed.     Pending Referrals:  [facility] [place]  Ph: [phone]  Fax: [phone]    Declined:  [facility]  Ph: [phone]  Fax: [phone]      Sandra Barton, RN   Nurse Coordinator    Covering for: 5 M/S  Phone: *[phone]    Social Work and Care Management Department       SEARCHABLE in [facility] - search CARE COORDINATOR       [place] ([phone]) Saturday & Sunday; ([phone]) [facility] Recognized Holidays     Units: 5A, 5B & 5C  Pager: [phone]    Units: 6B, 6C & 6D    Pager: [phone]    Units: 7A, 7B, 7C & 7D    Pager: [phone]    Units: 6A & ICU   Pager: [phone]    Units: 5 Ortho, 5MS & WB ED Pager: [phone]    Units: 6MS, 8A & 10 ICU  Pager [phone]

## 2024-01-11 NOTE — PROGRESS NOTES
[facility]    Medicine Progress Note - Hospitalist Service, GOLD TEAM 16    Date of Admission:  1/5/2024    Assessment & Plan      Marylee Woods is a 68 year old female admitted on 1/5/2024. She has a history of Graves disease s/p ablation , HTN, CKD< MARYLU, depression, grade 2 DCIS of L breast, GERD, osteoporosis, and Multiple Sclerosis c/b neurogenic bladder and is admitted for severe hyponatremia, also found to have new pressure ulcers present on admission.     # Hyponatremia - resolved   - On admission, patient found to be hyponatremic to 119.   - Was given IV fluids and 3% saline in the [place] Department with improvement that has continued with gentle administration of normal saline.   -Initially, hyponatremia thought to be due to volume depletion for which patient required normal saline infusion.    -Subsequently became hyponatremic again with culprit being SIADH, hence, patient had to be placed on fluid restriction with subsequent improvement in her sodium at this point.  -Na has since returned to normal levels   HCTZ discontinued.   Continue fluid restriction   Okay for Q48H BMP checks for now      # Recent right distal femur fracture  # Recent left medial malleolus fracture  - Patient to be NWB for 8 weeks (until at least 1/11) but scheduled to see ortho on 1/24.    - Ortho reconsulted, they are now recommending nonweightbearing until clinic follow-up      # Cystitis with hematuria  # Pyuria   - Patient was started on Keflex 500 mg prior to admission and vancomycin/ceftriaxone after admission.  - She recieived x4 days of CTX. Last dose of CTX 1/9/24.   - Urine cx this admission was negative for bacterial growth.     # Sacral wounds, present on admission  # Coccyx left IT  - Patient not able to mobilize well and has been resisting turns at home.    - Suspect a combination of poor mobility as well as suboptimal nutrition.  - would consult, recs below  -Change  dressings every 3 days  - Cleanse the area with wound cleanser and pat dry.  - Apply No sting film barrier to periwound skin.  - Cover wound with Sacral Mepilex or mepilex (Left IT)  - Turn and reposition Q 2hrs side to side only.  - Avoid HOB greather than 30 except for meals. Lower HOB when done eating.   - Ensure pt has Redd-cushion while sitting up in the chair.  - FYI- If pt has constant incontinent loose stools needing dressing changes Q shift please discontinue the Mepilex dressing and apply criticaid barrier paste BID and PRN  nutrition consult      # Lactic acidosis - resolved.     # Acute kidney injury on chronic kidney disease - DENNISE resolved.     # Multiple sclerosis  # Neurogenic bladder  -Of note, she has received Lemtrada in 2017 & 2019.  Continue baclofen 20 mg every morning, 30 mg every afternoon.    # Hx of Graves s/p thyroid ablation  -TSH elevated to 52 on admission but has been missing doses  -repeat in 2-4 weeks  -Continue PTA levothyroxine    # HSV prophylaxis   -Continue pta acyclovir 400mg    #  Anemia of chronic disease  Admission Hgb of 10.2. and stable  - follow           Diet: Regular Diet Adult  Snacks/Supplements Adult: Ensure Enlive; Between Meals  Fluid restriction 1000 ML FLUID    DVT Prophylaxis: Pneumatic Compression Devices  Verduzco Catheter: Not present  Lines: None     Cardiac Monitoring: None  Code Status: Full Code      Clinically Significant Risk Factors                          # Moderate Malnutrition: based on nutrition assessment      # Financial/Environmental Concerns: none         Disposition Plan  Patient is currently medically cleared for discharge at this time.  Following discussion with therapy, patient will not qualify for TCU since she is to remain nonweightbearing until clinic follow-up.      GALI TRUONG MD  Hospitalist Service, GOLD TEAM [address] [place] [address]  Securely message with [employer] (more info)  Text page via [employer]  Paging/Directory   See signed in provider for up to date coverage information  ______________________________________________________________________    Interval History   -Afebrile and HDS  -Na has since resolved.  -Denies any acute complaints today.   - and sister updated today.    Physical Exam   Vital Signs: Temp: 98.3  F (36.8  C) Temp src: Oral BP: 128/69 Pulse: 88   Resp: 16 SpO2: 94 % O2 Device: None (Room air)    Weight: 140 lbs 0 oz    General Appearance: Alert, room air, no acute distress.   Respiratory: clear to auscultation bilaterally with good air entry   Cardiovascular: RRR.  Soft systolic murmur, no rubs, or gallops   GI: soft, non-tender, non-distended.  No hepatosplenomegaly or mass.  Other: Right lower extremity in hard cast.  Patient able to move bilateral toes.  Normal plantarflexion dorsiflexion bilaterally.    Medical Decision Making             Data         Imaging results reviewed over the past 24 hrs:   Recent Results (from the past 24 hour(s))   XR Knee Right 1/2 Views    Narrative    EXAM: XR KNEE RIGHT 1/2 VIEWS  LOCATION: [facility]  DATE: 1/10/2024    INDICATION: s p 8 week casting for distal femoral metadiaphyseal fracture  COMPARISON: 12/13/2023      Impression    IMPRESSION: Overlying cast material which limits fine bony detail. Distal femoral metaphysis fracture with similar impaction and displacement. Interval healing with increased mature callus formation. There is likely partial osseous bridging. Normal joint   alignment. No significant knee joint effusion. Osteopenia.

## 2024-01-11 NOTE — PLAN OF CARE
Pt here for hyponatremia, has MS.     Goal Outcome Evaluation:      Plan of Care Reviewed With: patient    Overall Patient Progress: improving    Outcome Evaluation: L heel wound improved      VS: Temp: 98.6  F (37  C) Temp src: Oral BP: 138/72 Pulse: 107   Resp: 18 SpO2: 98 % O2 Device: None (Room air)      O2: RA   Output: Per Purewick   Last BM: 1/8 per chart, see provider notification note   Activity: Ax2, wheelchair bound at baseline, limited use of BLEs, R leg in cast. Turn q2h but occasionally refuses.    Skin: Pressure injuries to coccyx and L IT, wound to L heel mostly healed   Pain: Moderate, received oxycodone and baclofen x1   Neuro: A&O x4   Dressing: Per POC   Diet: Regular   LDA: L PIV SL   Equipment: Lift   Plan: Able to discharge to TCU when stable   Additional Info: Had Xray of RLE today. See prior note about provider notification- did not receive response. Left sticky.

## 2024-01-11 NOTE — PLAN OF CARE
Goal Outcome Evaluation:    Alert/oriented x4  Room air  VSS  Reposition Q2  Right leg cased. Xray taken yesterday and not given the okay to bear weight on it.   Coccyx wound. Mepilex applied. Left heel peeling. Pillows placed between legs  Wheelchair at baseline.  Sodium level 137  1000 ml fluid restriction  Incontinent of bladder. Pure wick in place.   and son at bedside this afternoon.  Good appetite. Drinking supplemental shakes between meals.  Call light within reach, able to make needs known.

## 2024-01-12 ENCOUNTER — APPOINTMENT (OUTPATIENT)
Dept: OCCUPATIONAL THERAPY | Facility: CLINIC | Age: 69
DRG: 644 | End: 2024-01-12
Payer: MEDICARE

## 2024-01-12 ENCOUNTER — APPOINTMENT (OUTPATIENT)
Dept: PHYSICAL THERAPY | Facility: CLINIC | Age: 69
DRG: 644 | End: 2024-01-12
Payer: MEDICARE

## 2024-01-12 PROCEDURE — 99232 SBSQ HOSP IP/OBS MODERATE 35: CPT | Performed by: INTERNAL MEDICINE

## 2024-01-12 PROCEDURE — 250N000013 HC RX MED GY IP 250 OP 250 PS 637: Performed by: STUDENT IN AN ORGANIZED HEALTH CARE EDUCATION/TRAINING PROGRAM

## 2024-01-12 PROCEDURE — 250N000011 HC RX IP 250 OP 636: Performed by: INTERNAL MEDICINE

## 2024-01-12 PROCEDURE — 97535 SELF CARE MNGMENT TRAINING: CPT | Mod: GO

## 2024-01-12 PROCEDURE — 250N000013 HC RX MED GY IP 250 OP 250 PS 637: Performed by: INTERNAL MEDICINE

## 2024-01-12 PROCEDURE — 97110 THERAPEUTIC EXERCISES: CPT | Mod: GP

## 2024-01-12 PROCEDURE — G0463 HOSPITAL OUTPT CLINIC VISIT: HCPCS

## 2024-01-12 PROCEDURE — 97161 PT EVAL LOW COMPLEX 20 MIN: CPT | Mod: GP

## 2024-01-12 PROCEDURE — 97530 THERAPEUTIC ACTIVITIES: CPT | Mod: GP

## 2024-01-12 PROCEDURE — 250N000013 HC RX MED GY IP 250 OP 250 PS 637

## 2024-01-12 PROCEDURE — 120N000002 HC R&B MED SURG/OB UMMC

## 2024-01-12 RX ORDER — POLYETHYLENE GLYCOL 3350 17 G/17G
17 POWDER, FOR SOLUTION ORAL DAILY
Status: DISCONTINUED | OUTPATIENT
Start: 2024-01-12 | End: 2024-01-20 | Stop reason: HOSPADM

## 2024-01-12 RX ADMIN — BACLOFEN 20 MG: 20 TABLET ORAL at 08:45

## 2024-01-12 RX ADMIN — GABAPENTIN 300 MG: 300 CAPSULE ORAL at 21:30

## 2024-01-12 RX ADMIN — ACYCLOVIR 400 MG: 400 TABLET ORAL at 08:45

## 2024-01-12 RX ADMIN — FAMOTIDINE 40 MG: 20 TABLET ORAL at 21:30

## 2024-01-12 RX ADMIN — Medication 600 MG: at 08:34

## 2024-01-12 RX ADMIN — ACYCLOVIR 400 MG: 400 TABLET ORAL at 21:30

## 2024-01-12 RX ADMIN — DULOXETINE HYDROCHLORIDE 80 MG: 60 CAPSULE, DELAYED RELEASE ORAL at 08:34

## 2024-01-12 RX ADMIN — ACETAMINOPHEN 975 MG: 325 TABLET, FILM COATED ORAL at 08:33

## 2024-01-12 RX ADMIN — ENOXAPARIN SODIUM 40 MG: 40 INJECTION SUBCUTANEOUS at 08:34

## 2024-01-12 RX ADMIN — LEVOTHYROXINE SODIUM 100 MCG: 100 TABLET ORAL at 06:16

## 2024-01-12 RX ADMIN — BACLOFEN 30 MG: 20 TABLET ORAL at 21:29

## 2024-01-12 RX ADMIN — OXYCODONE HYDROCHLORIDE 5 MG: 5 TABLET ORAL at 06:16

## 2024-01-12 RX ADMIN — LATANOPROST 1 DROP: 50 SOLUTION OPHTHALMIC at 08:36

## 2024-01-12 RX ADMIN — OXYCODONE HYDROCHLORIDE 5 MG: 5 TABLET ORAL at 21:30

## 2024-01-12 RX ADMIN — GABAPENTIN 300 MG: 300 CAPSULE ORAL at 08:34

## 2024-01-12 ASSESSMENT — ACTIVITIES OF DAILY LIVING (ADL)
ADLS_ACUITY_SCORE: 57

## 2024-01-12 NOTE — PROGRESS NOTES
Care Management Follow Up    Length of Stay (days): 6    Expected Discharge Date:       Concerns to be Addressed: discharge planning     Patient plan of care discussed at interdisciplinary rounds: Yes    Anticipated Discharge Disposition: Transitional Care     Anticipated Discharge Services:  TBD  Anticipated Discharge DME:  TBD    Patient/family educated on Medicare website which has current facility and service quality ratings: yes  Education Provided on the Discharge Plan: Yes  Patient/Family in Agreement with the Plan: yes    Referrals Placed by CM/SW:   Pending Referrals:  [facility]  Ph: [phone]  Fax: [phone]    [facility]  [address] [place]  [address]  P: [phone]  Admissions: [phone]  F: [phone]    [facility]  [address] [place]  [address]  P: [phone]  F: [phone]    [facility]  [address] [facility] [address] [address]  Contact Information  P: [phone]   F: [phone]    [facility]  [address] [place] [address]  Ph: [phone]  F: [phone]     Declined:  [facility]  Ph: [phone]  Fax: [phone]  Private pay costs discussed: Not applicable    Additional Information:    Called spouse for additional TCU referrals - sent as requested. SW dropped off a medicare care compare list to pt room for pt and spouse to review as well.    STEPHANY Stephenson  Float   Covering 10 ICU & [facility]  Ph: [phone]  Pager: [phone]

## 2024-01-12 NOTE — PLAN OF CARE
Pt is alert Ox4. On 1000 ml fluid restriction.  Pure wick in place.  Denies pain and Sob during this shift. In a great mood. ceiling lift into her chair for meals. Pt right leg casted ( don't bear weight on it).  Left PIV SL. Pt is waiting to be discharge to TCU. No acute changes during this shift.  All dressings are dry, clean and intact. Call light within reach.  POC.

## 2024-01-12 NOTE — PROGRESS NOTES
[facility] Nurse Inpatient Assessment     Consulted for: Buttocks, left hip (IT), and left heel    Summary: Deep tissue pressure injury to coccyx and left IT. Peeling skin to left heel.     Patient History (according to provider note(s):      Marylee Woods is a 68 year old female admitted on 1/5/2024. She has a history of Graves disease s/p ablation , HTN, CKD< MARYLU, depression, grade 2 DCIS of L breast, GERD, osteoporosis, and MS c/b neurogenic bladder and is admitted for severe hyponatremia, also found to have new pressure ulcers.     Assessment:      Areas visualized during today's visit: Perineal area, Sacrum/coccyx, and Lower extremities     Heels: Right heel unable to assess as it is casted. Right heel is intact, no signs of pressure injuy.    Pressure Injury Location: coccyx     1/7/24  Last photo: 1/7/24  Wound type: Pressure Injury     Pressure Injury Stage: 2, present on admission   Wound history/plan of care:  Evolving pressure injury   Wound base: 90% dermis, 10% fibrin     Palpation of the wound bed: normal      Drainage: small     Description of drainage: serosanguinous     Measurements (length x width x depth, in cm) Total area 2.5  x 4  x  0.2 cm   Periwound skin: Intact      Color: normal and consistent with surrounding tissue      Temperature: normal   Odor: none  Pain: mild, tender  Pain intervention prior to dressing change: slow and gentle cares   Treatment goal: Heal   STATUS: evolving  Supplies ordered: supplies stored on unit, discussed with RN, and discussed with patient     Pressure Injury Location: Left IT     1/7/24 1/12  Last photo: 1/12/24  Wound type: Pressure Injury     Pressure Injury Stage: 2, present on admission   Wound history/plan of care:  Open dermis within slowly blanchable erythema.   Wound base: 100 % maroon,     Palpation of the wound bed: normal      Drainage:  none     Description of drainage: none     Measurements (length x width x depth, in cm)   Dermis: 3.5 x 2.5 x 0 cm  Maroon: 1 x 2 x 0 cm   Periwound skin: Intact      Color: normal and consistent with surrounding tissue      Temperature: normal   Odor: none  Pain: denies , none  Pain intervention prior to dressing change: no significant pain present   Treatment goal: Heal  and Protection  STATUS: evolving  Supplies ordered: supplies stored on unit and discussed with RN         Treatment Plan:     Coccyx and Left IT wound(s): Every 3 days  Cleanse the area with wound cleanser and pat dry.  Apply No sting film barrier to periwound skin.  Cover wound with Sacral Mepilex or mepilex (Left IT)  Change dressing Q 3 days.  Turn and reposition Q 2hrs side to side only.  Avoid HOB greather than 30 except for meals. Lower HOB when done eating.   Ensure pt has Redd-cushion while sitting up in the chair.  FYI- If pt has constant incontinent loose stools needing dressing changes Q shift please discontinue the Mepilex dressing and apply criticaid barrier paste BID and PRN.    Heels: Daily  Prevalon boot to left foot for prevention.   Elevate leg on 2 pillows to float heels.     Orders: Reviewed    RECOMMEND PRIMARY TEAM ORDER: None, at this time  Education provided: importance of repositioning, plan of care, and wound progress  Discussed plan of care with: Patient and Nurse  WOC nurse follow-up plan: weekly  Notify WOC if wound(s) deteriorate.  Nursing to notify the Provider(s) and re-consult the WOC Nurse if new skin concern.    DATA:     Current support surface: Standard  Low air loss (MISTY pump, Isolibrium, Pulsate, skin guard, etc)  Containment of urine/stool: Purewick external catheter   BMI: Body mass index is 21.29 kg/m .   Active diet order: Orders Placed This Encounter      Regular Diet Adult     Output: I/O last 3 completed shifts:  In: 400 [P.O.:400]  Out: -      Labs:   Recent Labs   Lab 01/06/24  1057 01/05/24  1172    ALBUMIN  --  3.6   HGB 9.0* 10.2*   WBC 6.0 7.2     Pressure injury risk assessment:   Sensory Perception: 2-->very limited  Moisture: 3-->occasionally moist  Activity: 2-->chairfast  Mobility: 2-->very limited  Nutrition: 3-->adequate  Friction and Shear: 2-->potential problem  Broderick Score: 14    Marguerite Reyes RN CWOCN  Pager no longer is use, please contact through [employer] group: [facility] Nurse [facility]  Dept. Office Number: [phone]

## 2024-01-12 NOTE — PROGRESS NOTES
"Pt Karyn lift up to her power chair. Please ensure pt up to chair for meals at least, ideally chair<>bed every couple hours to promote wound healing/offloading.       01/12/24 1500   Appointment Info   Signing Clinician's Name / Credentials (PT) Mi Hernandez DPT   Living Environment   People in Home spouse   Current Living Arrangements house   Home Accessibility wheelchair accessible   Transportation Anticipated health plan transportation   Living Environment Comments pt lives in home with spouse that is fully wc-accessible.   Self-Care   Usual Activity Tolerance fair   Current Activity Tolerance fair   Regular Exercise No   Equipment Currently Used at Home lift device;hospital bed;wheelchair, power;commode chair;walker, rolling   Fall history within last six months yes   Number of times patient has fallen within last six months 1   Activity/Exercise/Self-Care Comment prior to initial fractures mid nov pt was participating in OP PT/OT 1x/week, needed assist for LB dressing but could perform UB dressing sitting up EOB, Charity from spouse for transfers with walker and was working on walking x18' with OP PT but this was not yet functional daily mobility in home. Pt was wc-based for mobility. Since B LE fractures that left her NWB B LE mid november pt has been home using new power wc full time, karyn lift or spouse providing dependent lateral transfer from bed<>power wc and getting assist for all ADLs. She was to recieve HH therapies, but only came once. Pt went home with HEP from last admission as well but did not follow through with use.   General Information   Onset of Illness/Injury or Date of Surgery 01/05/24   Referring Physician Harry Castellanos MD   Patient/Family Therapy Goals Statement (PT) \"get stronger and go to TCU\"   Pertinent History of Current Problem (include personal factors and/or comorbidities that impact the POC) per chart review, \"Marylee Woods is a 68 year old female admitted on 1/5/2024. She has a " "history of Graves disease s/p ablation , HTN, CKD< MARYLU, depression, grade 2 DCIS of L breast, GERD, osteoporosis, and Multiple Sclerosis c/b neurogenic bladder and is admitted for severe hyponatremia, also found to have new pressure ulcers present on admission. \"   Existing Precautions/Restrictions fall;weight bearing   Weight-Bearing Status - LLE nonweight-bearing   Weight-Bearing Status - RLE nonweight-bearing   General Observations spouse and friend present, pt up in power chair   Cognition   Affect/Mental Status (Cognition) WNL   Orientation Status (Cognition) oriented x 4   Follows Commands (Cognition) WNL   Pain Assessment   Patient Currently in Pain No   Integumentary/Edema   Integumentary/Edema Comments R LE in hard cast, L LE 1+ pitting edema around ankle, frail skin but overall intact   Posture    Posture Kyphosis   Range of Motion (ROM)   ROM Comment PROM WNL L LE, NT on R LE 2/2 hard cast   Strength (Manual Muscle Testing)   Strength Comments very weak B LEs, pt with weakness at baseline from severe MS, currently DF 2+ and 1+ quad and hamstring activation, pt reports weaker than baseline but still was very wak at baseline. Unable to assess R LE 2/2 hard cast, unable to lift off bed but would not be able to do at baseline either 2/2 MS status per pt   Bed Mobility   Comment, (Bed Mobility) rolls with CGA with rail use while spouse manages OH Lift sling   Transfers   Comment, (Transfers) dependent OH lift bed>chair, spouse demonstrating excellent knowledge and safety with use   Gait/Stairs (Locomotion)   Comment, (Gait/Stairs) NT- not functionally ambulatory at baseline as currently B LE NWB   Balance   Balance Comments NT in standing due B LE NWB   Sensory Examination   Sensory Perception patient reports no sensory changes   Clinical Impression   Criteria for Skilled Therapeutic Intervention Yes, treatment indicated   PT Diagnosis (PT) impaired functional mobility   Influenced by the following impairments " deconditioning and weakness from prolonged B LE NWB status   Functional limitations due to impairments none from most recent baseline, pt has been dependent transfer since mid Nov while NWB   Clinical Presentation (PT Evaluation Complexity) stable   Clinical Presentation Rationale PMH, clinician impression   Clinical Decision Making (Complexity) low complexity   Planned Therapy Interventions (PT) home exercise program;strengthening;home program guidelines   Risk & Benefits of therapy have been explained evaluation/treatment results reviewed;care plan/treatment goals reviewed;risks/benefits reviewed;current/potential barriers reviewed;participants voiced agreement with care plan;participants included;patient;spouse/significant other;friend   Clinical Impression Comments pt overall near her most recent baseline of dependent transfers and power wc-use since B LE fractures in november. Continues to have limited PT functional progression until B NWB can be lifted. She does present with increasd  L LE and core weakness that would be expected given limited activity under NWB status but are not impairing her status of being dependent transfers.   PT Total Evaluation Time   PT Eval, Low Complexity Minutes (97558) 20   Physical Therapy Goals   PT Frequency 2x/week   PT Predicted Duration/Target Date for Goal Attainment 01/26/24   PT Goals PT Goal 1   PT: Goal 1 pt and family will demo IND with LE strengthening HEP to progress L side and prevent further deconditioning until can bear weight   Interventions   Interventions Quick Adds Therapeutic Activity;Therapeutic Procedure   Therapeutic Procedure/Exercise   Ther. Procedure: strength, endurance, ROM, flexibillity Minutes (92356) 9   Symptoms Noted During/After Treatment fatigue   Treatment Detail/Skilled Intervention L LE HEP instructions. Pt did receive before going home did not perform- x10 each AAROM ankle pumps, SAQ, and heel slide + isometric glute, quad and hamstring  sets. Counseled pt on the ones who coud preethie IND and otherwise family to assist with AAROM exercises. Will have tech drop off handout later this pm.   Therapeutic Activity   Therapeutic Activities: dynamic activities to improve functional performance Minutes (52718) 15   Treatment Detail/Skilled Intervention lengthy discussion around d/c planning and dispo recs, pt and family dissaointed but understsanding that PT needs alone would not warrant TCU stay. Asking questions around difference between PT/OT and roles of each. Thorough explanation of roles and why pt's needs are greater OT than PT. Counseled on TCU acceptance options if this is strongly their wish could be OT alone but can be difficult to find placement with this. Continued to  on importance of offloading and rotating bewteen bed<>chair for sacral wound healing, they verblize understanding and to use tilt features on pt's power chair to suppor this as well. Pt and family in agreement for plan for low frequency PT while IP with goals to focus on L LE and HEP progression. Left supine in bed with all cares in reach, OT to follow soon.   PT Discharge Planning   PT Plan follow up on HEP, trial or discuss sitting exercises as well for when up in chair   PT Discharge Recommendation (DC Rec) home with assist;home with home care physical therapy   PT Rationale for DC Rec pt is overall near her most recent basline, spouse and pt demo adequate bed mobility and transfer with OH lift or dependent lateral transfer as they were performing at home. Unfortunately, meaningful PT rehab potential is stalled by NWB B LE use as pt not able to progress transfers or gait until can bear weight. She otherwise continues to have all equipment and assist at home for safe discharge home (has hospital bed, power wc, Karyn lift). Pt does have PT needs as is generally weak and deconditioned from prolonged immobility and PMH of MS. However, these needs are not great enough to  warrant a TCU stay as PT specific needs and could be addressed with HH/OP PT for general LE exercise program. Pt simply dose not have enough skilled functional mobility opportunities until can bear weight. Of note, OT is recommending TCU. If pt were to be accepted to TCU she could benefit from PT there at low frequency until able to bear weight (currently next clinic visit 1/24 to assess readiness). Once pt is able to bear weight she will certainly have increased skilled PT needs. However, again, limited and not justifiable for TCU stay based on PT/mobility needs alone while remains NWB B LE.   PT Brief overview of current status rolls CGA with rail use, OH lift to chair   Total Session Time   Timed Code Treatment Minutes 24   Total Session Time (sum of timed and untimed services) 44

## 2024-01-12 NOTE — PROGRESS NOTES
Care Management Follow Up    Length of Stay (days): 6    Expected Discharge Date:       Concerns to be Addressed: discharge planning     Patient plan of care discussed at interdisciplinary rounds: Yes    Anticipated Discharge Disposition: Transitional Care     Anticipated Discharge Services:    Anticipated Discharge DME:      Patient/family educated on Medicare website which has current facility and service quality ratings: yes  Education Provided on the Discharge Plan: Yes  Patient/Family in Agreement with the Plan: yes    Referrals Placed by CM/SW: TCU referrals sent by   Private pay costs discussed: Not applicable    Additional Information:    Spouse, Manuel, called and requested that [facility] be added to the list of referrals. Writer sent referral via [email or web address].    Pending Referrals:  [facility]  P: [phone]  Fax: [phone]    [facility]  Ph: [phone]  Fax: [phone]     [facility]  [address] [place]  [address]  P: [phone]  Admissions: [phone]  F: [phone]     [facility]  [address] [place]  [address]  P: [phone]  F: [phone]     [facility]  [address] [facility] [address]  Contact Information  P: [phone]   F: [phone]     [facility]  [address] [place] [address]  Ph: [phone]  F: [phone]     Declined:  [facility]  Ph: [phone]  Fax: [phone]  Private pay costs discussed: Not applicable       Elly Pulliam, MSN, APRN, AGCNS-BC   5MS [phone]  Nurse Coordinator    SEARCHABLE in [facility] - search CARE COORDINATOR     For Weekend & Holiday on call RN Care Coordinator:  (Tasks: Home care, home infusion, medical equipment, transportation, IMM & GONZALEZ forms, etc.)  [place] (0800 - 1630) Saturday and Sunday    Units: 5A, 5B, & 5C: [phone]    Units: 6B, 6C, 6D: [phone]    Units: 7A, 7B, 7C,  7D: [phone]    Units: 6A/ICU : [phone]     [facility] (4830-5231) Saturday and Sunday    Units: 6 Med/Surg, 8A, 10 ICU, & Pediatric Units: [phone]    Units: 5 Ortho, 5Med/Surg & WB ED: [phone]

## 2024-01-12 NOTE — PLAN OF CARE
Goal Outcome Evaluation:      Plan of Care Reviewed With: patient    Overall Patient Progress: no changeOverall Patient Progress: no change    Pain managed with oxy x2 this shift.  Purewick in place. Turn and repo as allowed. 1L fluid restriction.   Pt is to be ceiling lifted into her chair for all meals, per spouse and OT.

## 2024-01-12 NOTE — PROGRESS NOTES
"Occupational Therapy Evaluation       01/11/24 1600   Appointment Info   Signing Clinician's Name / Credentials (OT) Minerva Gonzalez OTR/L   Rehab Comments (OT) NWB BLE until ortho follow up 1/24   Living Environment   People in Home spouse   Current Living Arrangements house   Home Accessibility wheelchair accessible   Transportation Anticipated health plan transportation   Living Environment Comments Pt lives in a house with her , PTA utilized lift, wheelchair, and hospital bed due to BLE NWB. At baseline, pt uses power wheelchair for functional mobility and transfers via SPT.   Self-Care   Usual Activity Tolerance moderate   Current Activity Tolerance fair   Equipment Currently Used at Home lift device;hospital bed;wheelchair, power;commode chair   Fall history within last six months yes   Number of times patient has fallen within last six months 3   Activity/Exercise/Self-Care Comment Prior to fractures preceding prior admission, pt completed OT/PT outpatient 1x/wk for MS - walked 18 ft w FWW. mod IND with most ADLs at baseline from wheelchair   Instrumental Activities of Daily Living (IADL)   IADL Comments mod IND with IADLs from wheelchair and shares with spouse at baseline   General Information   Onset of Illness/Injury or Date of Surgery 01/05/23   Referring Physician Kanu Vazquez MD   Patient/Family Therapy Goal Statement (OT) to gain strength and independence with ADLs   Additional Occupational Profile Info/Pertinent History of Current Problem Per chart, \"Marylee Woods is a 68 year old female admitted on 1/5/2024. She has a history of Graves disease s/p ablation , HTN, CKD< MARYLU, depression, grade 2 DCIS of L breast, GERD, osteoporosis, and Multiple Sclerosis c/b neurogenic bladder and is admitted for severe hyponatremia, also found to have new pressure ulcers present on admission.\"   Existing Precautions/Restrictions weight bearing   Left Upper Extremity (Weight-bearing Status) full " weight-bearing (FWB)   Right Upper Extremity (Weight-bearing Status) full weight-bearing (FWB)   Left Lower Extremity (Weight-bearing Status) non weight-bearing (NWB)   Right Lower Extremity (Weight-bearing Status) non weight-bearing (NWB)   General Observations and Info Activity: up ad jaren   Cognitive Status Examination   Orientation Status orientation to person, place and time   Affect/Mental Status (Cognitive) WFL   Follows Commands WNL   Visual Perception   Visual Impairment/Limitations corrective lenses full-time   Sensory   Sensory Quick Adds sensation intact   Pain Assessment   Patient Currently in Pain Yes, see Vital Sign flowsheet   Posture   Posture kyphosis;protracted shoulders;forward head position   Range of Motion Comprehensive   General Range of Motion bilateral upper extremity ROM WFL   Strength Comprehensive (MMT)   Comment, General Manual Muscle Testing (MMT) Assessment LUE weaker than RUE at baseline. Grossly 4/5 strength shoulder and elbow, 3/5 wrist and hand   Coordination   Coordination Comments LUE FM coordination impaired   Bed Mobility   Bed Mobility supine-sit;sit-supine   Supine-Sit [place] (Bed Mobility) maximum assist (25% patient effort);2 person assist   Sit-Supine [place] (Bed Mobility) maximum assist (25% patient effort);2 person assist   Transfers   Transfer Comments Lift   Balance   Balance Comments Sitting balance impaired, requiring BUE support and CGA for static sitting balance, mod A for dynamic sitting balance   Activities of Daily Living   BADL Assessment/Intervention bathing;upper body dressing;lower body dressing;clothing fastener management;grooming;toileting   Bathing Assessment/Intervention   [place] Level (Bathing) maximum assist (25% patient effort)   Comment, (Bathing) Per clinical judgement   Upper Body Dressing Assessment/Training   Comment, (Upper Body Dressing) Per clinical judgement   [place] Level (Upper Body Dressing) maximum assist (25%  patient effort)   Lower Body Dressing Assessment/Training   [place] Level (Lower Body Dressing) dependent (less than 25% patient effort)   Clothes Fastener Management   [place] Level (Clothes Fastener Management) minimum assist (75% patient effort)   Comment, (Clothes Fastener Management) Per clinical judgement   Grooming Assessment/Training   [place] Level (Grooming) moderate assist (50% patient effort)   Toileting   Comment, (Toileting) Per clinical judgement   [place] Level (Toileting) maximum assist (25% patient effort)   Clinical Impression   Criteria for Skilled Therapeutic Interventions Met (OT) Yes, treatment indicated   OT Diagnosis decreased ADL independence   OT Problem List-Impairments impacting ADL problems related to;activity tolerance impaired;balance;coordination;strength  (precautions)   Assessment of Occupational Performance 5 or more Performance Deficits   Identified Performance Deficits ADL including bathing, toileting, dressing, g/h; IADLs   Planned Therapy Interventions (OT) ADL retraining;IADL retraining;bed mobility training;fine motor coordination training;strengthening;home program guidelines;progressive activity/exercise   Clinical Decision Making Complexity (OT) detailed assessment/moderate complexity   Risk & Benefits of therapy have been explained evaluation/treatment results reviewed;care plan/treatment goals reviewed;risks/benefits reviewed;current/potential barriers reviewed;participants voiced agreement with care plan;participants included;patient;spouse/significant other   Clinical Impression Comments Pt presents below functional baseline, limited primarily by BLE NWB, deconditioning, and impaired coordination impacting ADL independence. Pt would benefit from continued OT to progress ADL IND within precautions as well as overall strength and conditioning while NWB.   OT Total Evaluation Time   OT Eval, Moderate Complexity Minutes (69706) 30   OT Goals   Therapy  Frequency (OT) 5 times/week   OT Predicted Duration/Target Date for Goal Attainment 02/16/24   OT Goals Hygiene/Grooming;Upper Body Dressing;Upper Body Bathing;Bed Mobility;Toilet Transfer/Toileting   OT: Hygiene/Grooming modified independent;from wheelchair   OT: Upper Body Dressing Modified independent;from wheelchair   OT: Upper Body Bathing Modified independent   OT: Bed Mobility Modified independent;supine to/from sitting   OT: Toilet Transfer/Toileting Minimal assist;cleaning and garment management;within precautions   Therapeutic Activities   Therapeutic Activity Minutes (67638) 26   Symptoms noted during/after treatment fatigue;increased pain   Treatment Detail/Skilled Intervention OT: Following evaluation, treatment indicated. Facilitated ADL for increased IND and activity tolerance. Pt sat EoB with max Ax2 and increased time, pt able to complete partial trunk elevation, assist for all BLE management. Completed static and dynamic EoB activity to progress overall trunk strengthening and sitting balance. Pt CGA sitting EoB with BUEs on rails, mod A without BUE support and with weight shifting. Educated pt on recommendation to be up from bed to wheelchair for meals to maintain upright activity tolerance. Educated and demonstrated for pt amount of tilt recommended in wheelchair for pressure relief off seated surface, educated pt on frequency and duration of pressure reliefs when up in chair during day, pt demonstrated good understanding. Pt returned to supine max Ax2, repositioned to sidelying. Pt left with call light within reach, all needs met.   OT Discharge Planning   OT Plan OT: trial OH lift to commode, EoB activity, ADLs from wheelchair, LUE coordination   OT Discharge Recommendation (DC Rec) Transitional Care Facility   OT Rationale for DC Rec Pt presents below functional baseline, limited primarily by BLE NWB, deconditioning, and impaired coordination impacting ADL independence. Recommend TCU to  progress ADL independence toward PLOF within precautions.   OT Brief overview of current status max Ax2 bed mobility, CGA EoB with BUE support, mod A without BUE support EoB   Total Session Time   Timed Code Treatment Minutes 26   Total Session Time (sum of timed and untimed services) 56

## 2024-01-12 NOTE — PROGRESS NOTES
[facility]    Medicine Progress Note - Hospitalist Service, GOLD TEAM 16    Date of Admission:  1/5/2024    Assessment & Plan      Marylee Woods is a 68 year old female admitted on 1/5/2024. She has a history of Graves disease s/p ablation , HTN, CKD< MARYLU, depression, grade 2 DCIS of L breast, GERD, osteoporosis, and Multiple Sclerosis c/b neurogenic bladder and is admitted for severe hyponatremia, also found to have new pressure ulcers present on admission.     # Hyponatremia - resolved   - On admission, patient found to be hyponatremic to 119.   - Was given IV fluids and 3% saline in the Emegency Department with improvement that has continued with gentle administration of normal saline.   -Initially, hyponatremia thought to be due to volume depletion for which patient required normal saline infusion.    -Subsequently became hyponatremic again with culprit being SIADH, hence, patient had to be placed on fluid restriction with subsequent improvement in her sodium at this point.  - Na has since returned to normal levels   HCTZ discontinued.   Continue on same fluid restriction for now, reassess tomorrow.   Continue monitoring Na.       # Recent right distal femur fracture  # Recent left medial malleolus fracture  - Patient to be NWB for 8 weeks (until at least 1/11) but scheduled to see ortho on 1/24.    - Ortho reconsulted, they are now recommending nonweightbearing until clinic follow-up      # Cystitis with hematuria  # Pyuria   - Patient was started on Keflex 500 mg prior to admission and vancomycin/ceftriaxone after admission.  - She recieived x4 days of CTX. Last dose of CTX 1/9/24.   - Urine cx this admission was negative for bacterial growth.     # Sacral wounds, present on admission  # Coccyx left IT  - Patient not able to mobilize well and has been resisting turns at home.    - Suspect a combination of poor mobility as well as suboptimal nutrition.  - would  consult, recs below  - Change dressings every 3 days  - Cleanse the area with wound cleanser and pat dry.  - Apply No sting film barrier to periwound skin.  - Cover wound with Sacral Mepilex or mepilex (Left IT)  - Turn and reposition Q 2hrs side to side only.  - Avoid HOB greather than 30 except for meals. Lower HOB when done eating.   - Ensure pt has Redd-cushion while sitting up in the chair.    # Lactic acidosis - resolved.     # Acute kidney injury on chronic kidney disease - DENNISE resolved.     # Multiple sclerosis  # Neurogenic bladder  -Of note, she has received Lemtrada in 2017 & 2019.  Continue baclofen 20 mg every morning, 30 mg every afternoon.    # Hx of Graves s/p thyroid ablation  -TSH elevated to 52 on admission but has been missing doses  -repeat in 2-4 weeks  -Continue PTA levothyroxine    # HSV prophylaxis   -Continue pta acyclovir 400mg    #  Anemia of chronic disease  Admission Hgb of 10.2. and stable  - follow           Diet: Regular Diet Adult  Snacks/Supplements Adult: Ensure Enlive; Between Meals  Fluid restriction 1000 ML FLUID    DVT Prophylaxis: Pneumatic Compression Devices  Verduzco Catheter: Not present  Lines: None     Cardiac Monitoring: None  Code Status: Full Code      Clinically Significant Risk Factors                          # Moderate Malnutrition: based on nutrition assessment      # Financial/Environmental Concerns: none         Disposition Plan   TBD. Pt needs TCU.       Pankaj Cloud MD  Hospitalist Service, GOLD TEAM [address] [place] [address]  Securely message with [employer] (more info)  Text page via [employer] Paging/Directory   See signed in provider for up to date coverage information  ______________________________________________________________________    Interval History   -No acute events overnight.  Patient was pleasant.  Patient is waiting for placement.  - and sister updated today.    Physical Exam   Vital Signs: Temp: 98.2   F (36.8  C) Temp src: Oral BP: 118/68 Pulse: 79   Resp: 18 SpO2: 97 % O2 Device: None (Room air)    Weight: 140 lbs 0 oz    General Appearance: Alert, room air, no acute distress.   Respiratory: clear to auscultation bilaterally with good air entry   Cardiovascular: RRR.  Soft systolic murmur, no rubs, or gallops   GI: soft, non-tender, non-distended.  No hepatosplenomegaly or mass.  Other: Right lower extremity in hard cast.  Patient able to move bilateral toes.  Normal plantarflexion dorsiflexion bilaterally.    Medical Decision Making             Data     I have personally reviewed the following data over the past 24 hrs:    N/A  \   N/A   / N/A     N/A N/A N/A /  N/A   N/A N/A N/A \       Imaging results reviewed over the past 24 hrs:   No results found for this or any previous visit (from the past 24 hour(s)).

## 2024-01-13 LAB
ANION GAP SERPL CALCULATED.3IONS-SCNC: 6 MMOL/L (ref 7–15)
BUN SERPL-MCNC: 13.4 MG/DL (ref 8–23)
CALCIUM SERPL-MCNC: 8.8 MG/DL (ref 8.8–10.2)
CHLORIDE SERPL-SCNC: 106 MMOL/L (ref 98–107)
CREAT SERPL-MCNC: 0.64 MG/DL (ref 0.51–0.95)
DEPRECATED HCO3 PLAS-SCNC: 28 MMOL/L (ref 22–29)
EGFRCR SERPLBLD CKD-EPI 2021: >90 ML/MIN/1.73M2
ERYTHROCYTE [DISTWIDTH] IN BLOOD BY AUTOMATED COUNT: 16.5 % (ref 10–15)
GLUCOSE SERPL-MCNC: 92 MG/DL (ref 70–99)
HCT VFR BLD AUTO: 26.1 % (ref 35–47)
HGB BLD-MCNC: 8.5 G/DL (ref 11.7–15.7)
MCH RBC QN AUTO: 31.7 PG (ref 26.5–33)
MCHC RBC AUTO-ENTMCNC: 32.6 G/DL (ref 31.5–36.5)
MCV RBC AUTO: 97 FL (ref 78–100)
PLATELET # BLD AUTO: 201 10E3/UL (ref 150–450)
POTASSIUM SERPL-SCNC: 4.1 MMOL/L (ref 3.4–5.3)
RBC # BLD AUTO: 2.68 10E6/UL (ref 3.8–5.2)
SODIUM SERPL-SCNC: 140 MMOL/L (ref 135–145)
WBC # BLD AUTO: 3.4 10E3/UL (ref 4–11)

## 2024-01-13 PROCEDURE — 250N000013 HC RX MED GY IP 250 OP 250 PS 637: Performed by: INTERNAL MEDICINE

## 2024-01-13 PROCEDURE — 120N000002 HC R&B MED SURG/OB UMMC

## 2024-01-13 PROCEDURE — 5A09357 ASSISTANCE WITH RESPIRATORY VENTILATION, LESS THAN 24 CONSECUTIVE HOURS, CONTINUOUS POSITIVE AIRWAY PRESSURE: ICD-10-PCS | Performed by: INTERNAL MEDICINE

## 2024-01-13 PROCEDURE — 250N000011 HC RX IP 250 OP 636: Performed by: INTERNAL MEDICINE

## 2024-01-13 PROCEDURE — 80048 BASIC METABOLIC PNL TOTAL CA: CPT | Performed by: INTERNAL MEDICINE

## 2024-01-13 PROCEDURE — 85014 HEMATOCRIT: CPT | Performed by: INTERNAL MEDICINE

## 2024-01-13 PROCEDURE — 36415 COLL VENOUS BLD VENIPUNCTURE: CPT | Performed by: INTERNAL MEDICINE

## 2024-01-13 PROCEDURE — 250N000013 HC RX MED GY IP 250 OP 250 PS 637

## 2024-01-13 PROCEDURE — 99232 SBSQ HOSP IP/OBS MODERATE 35: CPT | Performed by: INTERNAL MEDICINE

## 2024-01-13 PROCEDURE — 250N000013 HC RX MED GY IP 250 OP 250 PS 637: Performed by: STUDENT IN AN ORGANIZED HEALTH CARE EDUCATION/TRAINING PROGRAM

## 2024-01-13 RX ORDER — LIDOCAINE 4 G/G
3 PATCH TOPICAL
Status: DISCONTINUED | OUTPATIENT
Start: 2024-01-14 | End: 2024-01-20 | Stop reason: HOSPADM

## 2024-01-13 RX ORDER — NALOXONE HYDROCHLORIDE 0.4 MG/ML
0.4 INJECTION, SOLUTION INTRAMUSCULAR; INTRAVENOUS; SUBCUTANEOUS
Status: DISCONTINUED | OUTPATIENT
Start: 2024-01-13 | End: 2024-01-20 | Stop reason: HOSPADM

## 2024-01-13 RX ORDER — NALOXONE HYDROCHLORIDE 0.4 MG/ML
0.2 INJECTION, SOLUTION INTRAMUSCULAR; INTRAVENOUS; SUBCUTANEOUS
Status: DISCONTINUED | OUTPATIENT
Start: 2024-01-13 | End: 2024-01-20 | Stop reason: HOSPADM

## 2024-01-13 RX ORDER — OXYCODONE HYDROCHLORIDE 5 MG/1
5 TABLET ORAL ONCE
Status: COMPLETED | OUTPATIENT
Start: 2024-01-14 | End: 2024-01-14

## 2024-01-13 RX ADMIN — GABAPENTIN 300 MG: 300 CAPSULE ORAL at 08:56

## 2024-01-13 RX ADMIN — GABAPENTIN 300 MG: 300 CAPSULE ORAL at 21:18

## 2024-01-13 RX ADMIN — BACLOFEN 30 MG: 20 TABLET ORAL at 21:18

## 2024-01-13 RX ADMIN — ACETAMINOPHEN 975 MG: 325 TABLET, FILM COATED ORAL at 08:56

## 2024-01-13 RX ADMIN — ACYCLOVIR 400 MG: 400 TABLET ORAL at 21:18

## 2024-01-13 RX ADMIN — FAMOTIDINE 40 MG: 20 TABLET ORAL at 21:18

## 2024-01-13 RX ADMIN — Medication 600 MG: at 09:03

## 2024-01-13 RX ADMIN — BACLOFEN 10 MG: 10 TABLET ORAL at 16:17

## 2024-01-13 RX ADMIN — OXYCODONE HYDROCHLORIDE 5 MG: 5 TABLET ORAL at 21:18

## 2024-01-13 RX ADMIN — ACYCLOVIR 400 MG: 400 TABLET ORAL at 08:56

## 2024-01-13 RX ADMIN — ENOXAPARIN SODIUM 40 MG: 40 INJECTION SUBCUTANEOUS at 08:55

## 2024-01-13 RX ADMIN — POLYETHYLENE GLYCOL 3350 17 G: 17 POWDER, FOR SOLUTION ORAL at 08:55

## 2024-01-13 RX ADMIN — ACETAMINOPHEN 975 MG: 325 TABLET, FILM COATED ORAL at 16:17

## 2024-01-13 RX ADMIN — BACLOFEN 20 MG: 20 TABLET ORAL at 10:36

## 2024-01-13 RX ADMIN — LEVOTHYROXINE SODIUM 100 MCG: 100 TABLET ORAL at 10:36

## 2024-01-13 RX ADMIN — BACLOFEN 10 MG: 10 TABLET ORAL at 23:36

## 2024-01-13 RX ADMIN — LATANOPROST 1 DROP: 50 SOLUTION OPHTHALMIC at 10:37

## 2024-01-13 RX ADMIN — OXYCODONE HYDROCHLORIDE 5 MG: 5 TABLET ORAL at 16:43

## 2024-01-13 RX ADMIN — DULOXETINE HYDROCHLORIDE 80 MG: 60 CAPSULE, DELAYED RELEASE ORAL at 08:56

## 2024-01-13 ASSESSMENT — ACTIVITIES OF DAILY LIVING (ADL)
ADLS_ACUITY_SCORE: 59
ADLS_ACUITY_SCORE: 57
ADLS_ACUITY_SCORE: 59
ADLS_ACUITY_SCORE: 57
ADLS_ACUITY_SCORE: 57
ADLS_ACUITY_SCORE: 61
ADLS_ACUITY_SCORE: 59
ADLS_ACUITY_SCORE: 57
ADLS_ACUITY_SCORE: 61
ADLS_ACUITY_SCORE: 59

## 2024-01-13 NOTE — PLAN OF CARE
End of shift Summary: See flowsheet for VS and detail assessments.     Changes this Shift: 2161-5109: A&Ox4, VSS, sats maintained on RA. Purewick in place. Back in bed after being in wheelchair, ceiling lift.  at bedside. Declined scheduled tylenol. Safety checks completed, call light remains within reach, able to make needs known.     Plan: Cont POC.

## 2024-01-13 NOTE — PLAN OF CARE
A/Ox4. VSS on Room Air. Denied SOB, CP, Cough, N/V, Dizziness, Headache. Pain rated treated with Oxy and repositioning. Pt declined ice pads and heating packs. Regular diet, thin liquids, takes pills whole. Incontinent of B/B, LBM 1/11/24. PureWick intact. Assist of 2 with ceiling lift to chair. L PIV SL, patent with flush. Call light within reach, able to make needs known. Appears to be sleeping during rounds. Pt requested to not be woken up overnight while she is sleeping. Pt refused Miralax and Tylenol.

## 2024-01-13 NOTE — PROGRESS NOTES
Care Management Follow Up    Length of Stay (days): 7    Expected Discharge Date: TBD     Concerns to be Addressed: Discharge Planning       Patient plan of care discussed at interdisciplinary rounds: Yes    Anticipated Discharge Disposition: Transitional Care     Anticipated Discharge Services: Post Acute Therapies     Anticipated Discharge DME: None     Patient/family educated on Medicare website which has current facility and service quality ratings: Yes    Education Provided on the Discharge Plan: Yes    Patient/Family in Agreement with the Plan: Yes    Referrals Placed by CM/SW: External Care Coordination    Private pay costs discussed: Not applicable    Additional Information:    Pending Referrals    [facility] [place]  P: [phone]  F: [phone]  1/13: Writer left message with Vanessa in admissions requesting a call back regarding referral status.     [facility]  [address] [place]  [address]  P: [phone]  Admissions: [phone]  F: [phone]  1/13: Writer left message with admissions requesting a call back regarding referrals status.     [facility]  [address] [place]  [address]  P: [phone]  F: [phone]  1/13: Writer spoke with Mimi in admissions. She did not receive the referral. Writer sent referral.     [facility]  [address] [facility] [address]  P: [phone]   F: [phone]  1/13: Writer sent referral as it had not been previously sent.     Declined Referrals    [facility]  P: [phone]  F: [phone]  1/13: Declined due to no skilled need/no therapy goals to be working on.    [facility]  [address] [place] [address]  P: [phone]  F: [phone]  1/13: Declined due to cannot meet patient's needs.    [facility]  P: [phone]  F: [phone]    HUYEN Santiago, SCOTT  6th Floor Medical Surgical Unit  Phone  [phone]

## 2024-01-13 NOTE — PLAN OF CARE
"End of shift Summary: See flowsheet for VS and detail assessments.     Changes this Shift: A&Ox4, VSS, sats maintained on RA.   Denies chest pain, SOB, cough.  Voiding spontaneously, purewick in place. Small formed BM this morning, ceiling lift up to commode.  Q2 turns, offered to get pt in chair for breakfast/lunch, declined, said she wanted to \"take it easy today.\"  Wound dressings CDI. Due to be changed 1/14. Redness to perineum.  Declined PRN pain medication throughout day until 1600, began having RLE spasms, baclofen & tylenol given. Pt then crying out in pain, additional 5 mg oxy given.  R Leg cast, L leg offloading boot in place.    L PIV patent, SL.      Plan: Cont POC.      "

## 2024-01-13 NOTE — PROGRESS NOTES
[facility]    Medicine Progress Note - Hospitalist Service, GOLD TEAM 16    Date of Admission:  1/5/2024    Assessment & Plan      Marylee Woods is a 68 year old female admitted on 1/5/2024. She has a history of Graves disease s/p ablation , HTN, CKD< MARYLU, depression, grade 2 DCIS of L breast, GERD, osteoporosis, and Multiple Sclerosis c/b neurogenic bladder and is admitted for severe hyponatremia, also found to have new pressure ulcers present on admission.     Today's changes:  No acute events overnight.  Patient was pleasant.  Sodium 140.  I decrease fluid restriction and allow the patient to have 1500 mL of fluids per day.  I had a long conversation with the patient about this and she expressed understanding.  Continue monitoring sodium levels and readjust fluid restriction accordingly.  Patient had a meeting with PT yesterday.  Patient waiting for TCU.    # Hyponatremia - resolved   - On admission, patient found to be hyponatremic to 119.   - Was given IV fluids and 3% saline in the Emegency Department with improvement that has continued with gentle administration of normal saline.   -Initially, hyponatremia thought to be due to volume depletion for which patient required normal saline infusion.    -Subsequently became hyponatremic again with culprit being SIADH, hence, patient had to be placed on fluid restriction with subsequent improvement in her sodium at this point.  - Na has since returned to normal levels   HCTZ discontinued.   Fluid restriction as above.  Continue monitoring Na.       # Recent right distal femur fracture  # Recent left medial malleolus fracture  - Patient to be NWB for 8 weeks (until at least 1/11) but scheduled to see ortho on 1/24.    - Ortho reconsulted, they are now recommending nonweightbearing until clinic follow-up    # Cystitis with hematuria  # Pyuria   - Patient was started on Keflex 500 mg prior to admission and  vancomycin/ceftriaxone after admission.  - She recieived x4 days of CTX. Last dose of CTX 1/9/24.   - Urine cx this admission was negative for bacterial growth.     # Sacral wounds, present on admission  # Coccyx left IT  - Patient not able to mobilize well and has been resisting turns at home.    - Suspect a combination of poor mobility as well as suboptimal nutrition.  - would consult, recs below  - Change dressings every 3 days  - Cleanse the area with wound cleanser and pat dry.  - Apply No sting film barrier to periwound skin.  - Cover wound with Sacral Mepilex or mepilex (Left IT)  - Turn and reposition Q 2hrs side to side only.  - Avoid HOB greather than 30 except for meals. Lower HOB when done eating.   - Ensure pt has Redd-cushion while sitting up in the chair.    # Lactic acidosis - resolved.     # Acute kidney injury on chronic kidney disease - DENNISE resolved.     # Multiple sclerosis  # Neurogenic bladder  -Of note, she has received Lemtrada in 2017 & 2019.  Continue baclofen 20 mg every morning, 30 mg every afternoon.    # Hx of Graves s/p thyroid ablation  -TSH elevated to 52 on admission but has been missing doses  -repeat in 2-4 weeks  -Continue PTA levothyroxine    # HSV prophylaxis   -Continue pta acyclovir 400mg    #  Anemia of chronic disease  Admission Hgb of 10.2. and stable  - follow     # MARYLU   -Patient on BiPAP at night.  Order was placed and RT will adjust settings for BiPAP.          Diet: Regular Diet Adult  Snacks/Supplements Adult: Ensure Enlive; Between Meals  Fluid restriction 1500 ML FLUID    DVT Prophylaxis: Pneumatic Compression Devices  Verduzco Catheter: Not present  Lines: None     Cardiac Monitoring: None  Code Status: Full Code      Clinically Significant Risk Factors                          # Moderate Malnutrition: based on nutrition assessment      # Financial/Environmental Concerns: none         Disposition Plan   TBD. Pt needs TCU.       Pankaj Cloud MD  Hospitalist  Service, GOLD TEAM 16  [facility]  Securely message with [employer] (more info)  Text page via [employer] Paging/Directory   See signed in provider for up to date coverage information  ______________________________________________________________________    Interval History   -No acute events overnight.  Patient was pleasant.  Patient is waiting for placement.    Physical Exam   Vital Signs: Temp: 98.7  F (37.1  C) Temp src: Oral BP: 126/58 Pulse: 80   Resp: 18 SpO2: 98 % O2 Device: None (Room air)    Weight: 140 lbs 0 oz    General Appearance: Alert, room air, no acute distress.   Respiratory: clear to auscultation bilaterally with good air entry   Cardiovascular: RRR.  Soft systolic murmur, no rubs, or gallops   GI: soft, non-tender, non-distended.  No hepatosplenomegaly or mass.  Other: Right lower extremity in hard cast.  Patient able to move bilateral toes.  Normal plantarflexion dorsiflexion bilaterally.    Medical Decision Making             Data     I have personally reviewed the following data over the past 24 hrs:    3.4 (L)  \   8.5 (L)   / 201     140 106 13.4 /  92   4.1 28 0.64 \       Imaging results reviewed over the past 24 hrs:   No results found for this or any previous visit (from the past 24 hour(s)).

## 2024-01-14 LAB
ANION GAP SERPL CALCULATED.3IONS-SCNC: 3 MMOL/L (ref 7–15)
BUN SERPL-MCNC: 16.4 MG/DL (ref 8–23)
CALCIUM SERPL-MCNC: 8.9 MG/DL (ref 8.8–10.2)
CHLORIDE SERPL-SCNC: 107 MMOL/L (ref 98–107)
CREAT SERPL-MCNC: 0.67 MG/DL (ref 0.51–0.95)
DEPRECATED HCO3 PLAS-SCNC: 29 MMOL/L (ref 22–29)
EGFRCR SERPLBLD CKD-EPI 2021: >90 ML/MIN/1.73M2
GLUCOSE SERPL-MCNC: 90 MG/DL (ref 70–99)
PLATELET # BLD AUTO: 193 10E3/UL (ref 150–450)
POTASSIUM SERPL-SCNC: 4.2 MMOL/L (ref 3.4–5.3)
SODIUM SERPL-SCNC: 139 MMOL/L (ref 135–145)

## 2024-01-14 PROCEDURE — 80048 BASIC METABOLIC PNL TOTAL CA: CPT | Performed by: INTERNAL MEDICINE

## 2024-01-14 PROCEDURE — 36415 COLL VENOUS BLD VENIPUNCTURE: CPT | Performed by: INTERNAL MEDICINE

## 2024-01-14 PROCEDURE — 250N000013 HC RX MED GY IP 250 OP 250 PS 637

## 2024-01-14 PROCEDURE — 250N000013 HC RX MED GY IP 250 OP 250 PS 637: Performed by: INTERNAL MEDICINE

## 2024-01-14 PROCEDURE — 250N000013 HC RX MED GY IP 250 OP 250 PS 637: Performed by: STUDENT IN AN ORGANIZED HEALTH CARE EDUCATION/TRAINING PROGRAM

## 2024-01-14 PROCEDURE — 120N000002 HC R&B MED SURG/OB UMMC

## 2024-01-14 PROCEDURE — 99232 SBSQ HOSP IP/OBS MODERATE 35: CPT | Performed by: INTERNAL MEDICINE

## 2024-01-14 PROCEDURE — 85049 AUTOMATED PLATELET COUNT: CPT | Performed by: INTERNAL MEDICINE

## 2024-01-14 PROCEDURE — 250N000011 HC RX IP 250 OP 636: Performed by: INTERNAL MEDICINE

## 2024-01-14 RX ADMIN — ACETAMINOPHEN 975 MG: 325 TABLET, FILM COATED ORAL at 01:55

## 2024-01-14 RX ADMIN — Medication 600 MG: at 09:42

## 2024-01-14 RX ADMIN — LIDOCAINE 3 PATCH: 4 PATCH TOPICAL at 20:39

## 2024-01-14 RX ADMIN — POLYETHYLENE GLYCOL 3350 17 G: 17 POWDER, FOR SOLUTION ORAL at 09:42

## 2024-01-14 RX ADMIN — LATANOPROST 1 DROP: 50 SOLUTION OPHTHALMIC at 09:42

## 2024-01-14 RX ADMIN — ACETAMINOPHEN 975 MG: 325 TABLET, FILM COATED ORAL at 09:40

## 2024-01-14 RX ADMIN — BACLOFEN 20 MG: 20 TABLET ORAL at 09:43

## 2024-01-14 RX ADMIN — GABAPENTIN 300 MG: 300 CAPSULE ORAL at 20:38

## 2024-01-14 RX ADMIN — FAMOTIDINE 40 MG: 20 TABLET ORAL at 20:39

## 2024-01-14 RX ADMIN — BACLOFEN 30 MG: 20 TABLET ORAL at 20:38

## 2024-01-14 RX ADMIN — ACYCLOVIR 400 MG: 400 TABLET ORAL at 20:39

## 2024-01-14 RX ADMIN — OXYCODONE HYDROCHLORIDE 5 MG: 5 TABLET ORAL at 00:24

## 2024-01-14 RX ADMIN — OXYCODONE HYDROCHLORIDE 5 MG: 5 TABLET ORAL at 05:29

## 2024-01-14 RX ADMIN — ACYCLOVIR 400 MG: 400 TABLET ORAL at 09:41

## 2024-01-14 RX ADMIN — ACETAMINOPHEN 975 MG: 325 TABLET, FILM COATED ORAL at 16:39

## 2024-01-14 RX ADMIN — ENOXAPARIN SODIUM 40 MG: 40 INJECTION SUBCUTANEOUS at 09:41

## 2024-01-14 RX ADMIN — DULOXETINE HYDROCHLORIDE 80 MG: 60 CAPSULE, DELAYED RELEASE ORAL at 09:42

## 2024-01-14 RX ADMIN — GABAPENTIN 300 MG: 300 CAPSULE ORAL at 09:42

## 2024-01-14 RX ADMIN — LIDOCAINE 1 PATCH: 4 PATCH TOPICAL at 00:26

## 2024-01-14 ASSESSMENT — ACTIVITIES OF DAILY LIVING (ADL)
ADLS_ACUITY_SCORE: 61
ADLS_ACUITY_SCORE: 57
ADLS_ACUITY_SCORE: 61
ADLS_ACUITY_SCORE: 57
ADLS_ACUITY_SCORE: 61
ADLS_ACUITY_SCORE: 57
ADLS_ACUITY_SCORE: 61
ADLS_ACUITY_SCORE: 57
ADLS_ACUITY_SCORE: 57

## 2024-01-14 NOTE — PLAN OF CARE
Goal Outcome Evaluation:      Plan of Care Reviewed With: patient    Overall Patient Progress: no change  VS: Blood pressure 126/72, pulse 74, temperature 98.2  F (36.8  C), temperature source Oral, resp. rate 18, weight 63.5 kg (140 lb), SpO2 97%, not currently breastfeeding.   O2: Stable on room air , denies SOB, dizziness and chest pain   Output: Purewick in place with some intermittent incontinence of urine     Last BM: No BM on shift   Activity: A2 with transfer with ceiling, A1-2 with check/change, pt is able to help roll from side to side with guidance.   Skin: Wound on coccyx/sacrum, has a cast on right foot.   Pain: Rating pain at 2/10, managed with Tylenol   Neuro: A&Ox4, denies numbness and tingling.   Dressing: Coccyx/sacrum dressing changed   Diet: Regular diet, denies N/V   LDA: Right PIV   Equipment: Personal items,    Plan: Continue POC.   Additional Info: Pt. transfer in wheelchair at 1600, still in w/c with spouse at bedside. Pt expresses no concerns at this, call light within, plan of care ongoing.

## 2024-01-14 NOTE — PROGRESS NOTES
"Care Management Follow Up    Length of Stay (days): 8    Expected Discharge Date:       Concerns to be Addressed: discharge planning     Patient plan of care discussed at interdisciplinary rounds: Yes    Anticipated Discharge Disposition: Transitional Care     Anticipated Discharge Services:    Anticipated Discharge DME:      Patient/family educated on Medicare website which has current facility and service quality ratings: yes  Education Provided on the Discharge Plan: Yes  Patient/Family in Agreement with the Plan: yes    Referrals Placed by CM/SW: External Care Coordination  Private pay costs discussed: Not applicable    Additional Information:  Discussed in rounds this morning with the provider the next steps for discharge. The patient is medically ready but she needs a safe discharge plan. Per chart review, patient has the appropriate medical equipment at home. Her , Manuel, is the main care giver. HEIDY FloresCC had talked to Manuel on 1/11 about other options for getting assistance at home including respite care or PCA services.     TCU referrals have been sent out and we are awaiting updates. However, the PT/OT recs are for home with homecare as patient does not meet the criteria for short term rehab. Also, her weight bearing status prevents her from being able to participate in rehab.    Writer called Manuel and left a voicemail to discuss in more detail.    14:45: Manuel returned call. He explained that he does not have family or friends who can assist with care giving. He also said that private pay respite care or PCA services are not an option due to finances; \"TCU is the only option.\" Writer reiterated why patient does not qualify for TCU per in hospital PT. But writer TCU referrals were sent and writer told Manuel which TCUs declined and what is still pending. Manuel wants to wait to here back regarding the pending TCU referrals.        Pending Referrals - per Frances, LICSW     [facility] [place] [facility] [place]  P: [phone]  F: [phone]  1/13: Writer left message with Vanessa in admissions requesting a call back regarding referral status.     [facility]  [address] [place]  [address]  P: [phone]  Admissions: [phone]  F: [phone]  1/13: Writer left message with admissions requesting a call back regarding referrals status.     [facility]  [address] [place]  [address]  P: [phone]  F: [phone]  1/13: Writer spoke with Mimi in admissions. She did not receive the referral. Writer sent referral.     [facility]  [address] [facility] [address]  P: [phone]   F: [phone]  1/13: Writer sent referral as it had not been previously sent.     Declined Referrals     [facility]  P: [phone]  F: [phone]  1/13: Declined due to no skilled need/no therapy goals to be working on.     [facility]  [address] [place] [address]  P: [phone]  F: [phone]  1/13: Declined due to cannot meet patient's needs.     [facility]  P: [phone]  F: [phone]      Elly Pulliam, MSN, APRN, [facility] [phone]  Nurse Coordinator    SEARCHABLE in [facility] - search CARE COORDINATOR     For Weekend & Holiday on call RN Care Coordinator:  (Tasks: Home care, home infusion, medical equipment, transportation, IMM & GONZALEZ forms, etc.)  [place] (0800 - 1630) Saturday and Sunday    Units: 5A, 5B, & 5C: [phone]    Units: 6B, 6C, 6D: [phone]    Units: 7A, 7B, 7C, 7D: [phone]    Units: 6A/ICU : [phone]     [facility] ([id number]) Saturday and Sunday    Units: 6 Med/Surg, 8A, 10 ICU, & Pediatric Units: [phone]    Units: 5 Ortho, 5Med/Surg &  ED: [phone]

## 2024-01-14 NOTE — PROGRESS NOTES
[facility]    Medicine Progress Note - Hospitalist Service, GOLD TEAM 16    Date of Admission:  1/5/2024    Assessment & Plan      Marylee Woods is a 68 year old female admitted on 1/5/2024. She has a history of Graves disease s/p ablation , HTN, CKD< MARYLU, depression, grade 2 DCIS of L breast, GERD, osteoporosis, and Multiple Sclerosis c/b neurogenic bladder and is admitted for severe hyponatremia, also found to have new pressure ulcers present on admission.       # Hyponatremia - resolved   - On admission, patient found to be hyponatremic to 119.   - Was given IV fluids and 3% saline in the Emegency Department with improvement that has continued with gentle administration of normal saline.   -Initially, hyponatremia thought to be due to volume depletion for which patient required normal saline infusion.    -Subsequently became hyponatremic again with culprit being SIADH, hence, patient had to be placed on fluid restriction with subsequent improvement in her sodium at this point.  - Na has since returned to normal levels   HCTZ discontinued.   Fluid restriction now 1.5 L     # Recent right distal femur fracture  # Recent left medial malleolus fracture  - Patient to be NWB for 8 weeks (until at least 1/11) but scheduled to see ortho on 1/24.    - Ortho reconsulted, they are now recommending nonweightbearing until clinic follow-up    # Cystitis with hematuria  # Pyuria   - Patient was started on Keflex 500 mg prior to admission and vancomycin/ceftriaxone after admission.  - She recieived x4 days of CTX. Last dose of CTX 1/9/24.   - Urine cx this admission was negative for bacterial growth.     # Sacral wounds, present on admission  # Coccyx left IT  - Patient not able to mobilize well and has been resisting turns at home.    - Suspect a combination of poor mobility as well as suboptimal nutrition.  - would consult, recs below  - Change dressings every 3 days  - Cleanse the  area with wound cleanser and pat dry.  - Apply No sting film barrier to periwound skin.  - Cover wound with Sacral Mepilex or mepilex (Left IT)  - Turn and reposition Q 2hrs side to side only.  - Avoid HOB greather than 30 except for meals. Lower HOB when done eating.   - Ensure pt has Redd-cushion while sitting up in the chair.    # Lactic acidosis - resolved.     # Acute kidney injury on chronic kidney disease - DENNISE resolved.     # Multiple sclerosis  # Neurogenic bladder  -Of note, she has received Lemtrada in 2017 & 2019.  Continue baclofen 20 mg every morning, 30 mg every afternoon.    # Hx of Graves s/p thyroid ablation  -TSH elevated to 52 on admission but has been missing doses  -repeat in 2-4 weeks  -Continue PTA levothyroxine    # HSV prophylaxis   -Continue pta acyclovir 400mg    #  Anemia of chronic disease  Admission Hgb of 10.2. and stable  - follow     # MARYLU   -Patient on BiPAP at night.  Order was placed and RT will adjust settings for BiPAP.          Diet: Regular Diet Adult  Snacks/Supplements Adult: Ensure Enlive; Between Meals  Fluid restriction 1500 ML FLUID    DVT Prophylaxis: Pneumatic Compression Devices  Verduzco Catheter: Not present  Lines: None     Cardiac Monitoring: None  Code Status: Full Code      Clinically Significant Risk Factors                          # Moderate Malnutrition: based on nutrition assessment      # Financial/Environmental Concerns: none         Disposition Plan  Seen by PT, recommending home with therapy, OT recommending TCU.       GALI TRUONG MD  Hospitalist Service, GOLD TEAM [address] [place] [address]  Securely message with [employer] (more info)  Text page via [facility] Paging/Directory   See signed in provider for up to date coverage information  ______________________________________________________________________    Interval History   - reported some increased pain in RLE  - received x1 oxycodone with good effect   - denies any  acute complaints  - patient hopeful about discharge to TCU    Physical Exam   Vital Signs: Temp: 97.8  F (36.6  C) Temp src: Oral BP: 135/54 Pulse: 73   Resp: 18 SpO2: 96 % O2 Device: None (Room air)    Weight: 140 lbs 0 oz    General Appearance: Alert, room air, no acute distress.   Respiratory: clear to auscultation bilaterally with good air entry   Cardiovascular: RRR.  Soft systolic murmur, no rubs, or gallops   GI: soft, non-tender, non-distended.  No hepatosplenomegaly or mass.  Other: Right lower extremity in hard cast.  Patient able to move bilateral toes.     Medical Decision Making             Data     I have personally reviewed the following data over the past 24 hrs:    N/A  \   N/A   / 193     139 107 16.4 /  90   4.2 29 0.67 \       Imaging results reviewed over the past 24 hrs:   No results found for this or any previous visit (from the past 24 hour(s)).

## 2024-01-14 NOTE — PROGRESS NOTES
Brief Cross Cover Note:     RN messaged regarding increased R.leg pain not due for oxycodone or tylenol and already given baclofen as well as noticing slightly lower BP (90s/40s), asymptomatic at this time. Patient w/ Hx of CKD (will avoid NSAIDS). Will given 1x spot dose of oxycodone for now and place order for Lidocaine patch. RN to continue to monitor BP closely.     Malorie Read MD

## 2024-01-15 ENCOUNTER — APPOINTMENT (OUTPATIENT)
Dept: OCCUPATIONAL THERAPY | Facility: CLINIC | Age: 69
DRG: 644 | End: 2024-01-15
Payer: MEDICARE

## 2024-01-15 LAB
ALBUMIN UR-MCNC: 70 MG/DL
APPEARANCE UR: ABNORMAL
BILIRUB UR QL STRIP: NEGATIVE
COLOR UR AUTO: YELLOW
ERYTHROCYTE [DISTWIDTH] IN BLOOD BY AUTOMATED COUNT: 17 % (ref 10–15)
GLUCOSE BLDC GLUCOMTR-MCNC: 100 MG/DL (ref 70–99)
GLUCOSE UR STRIP-MCNC: NEGATIVE MG/DL
HCT VFR BLD AUTO: 29.6 % (ref 35–47)
HGB BLD-MCNC: 9.3 G/DL (ref 11.7–15.7)
HGB UR QL STRIP: ABNORMAL
KETONES UR STRIP-MCNC: NEGATIVE MG/DL
LEUKOCYTE ESTERASE UR QL STRIP: ABNORMAL
MCH RBC QN AUTO: 31.7 PG (ref 26.5–33)
MCHC RBC AUTO-ENTMCNC: 31.4 G/DL (ref 31.5–36.5)
MCV RBC AUTO: 101 FL (ref 78–100)
MUCOUS THREADS #/AREA URNS LPF: PRESENT /LPF
NITRATE UR QL: NEGATIVE
PH UR STRIP: 6 [PH] (ref 5–7)
PLATELET # BLD AUTO: 235 10E3/UL (ref 150–450)
RBC # BLD AUTO: 2.93 10E6/UL (ref 3.8–5.2)
RBC URINE: 64 /HPF
SP GR UR STRIP: 1.02 (ref 1–1.03)
SQUAMOUS EPITHELIAL: 2 /HPF
UROBILINOGEN UR STRIP-MCNC: NORMAL MG/DL
WBC # BLD AUTO: 4.4 10E3/UL (ref 4–11)
WBC CLUMPS #/AREA URNS HPF: PRESENT /HPF
WBC URINE: >182 /HPF

## 2024-01-15 PROCEDURE — 99231 SBSQ HOSP IP/OBS SF/LOW 25: CPT | Performed by: INTERNAL MEDICINE

## 2024-01-15 PROCEDURE — 250N000013 HC RX MED GY IP 250 OP 250 PS 637

## 2024-01-15 PROCEDURE — 36415 COLL VENOUS BLD VENIPUNCTURE: CPT | Performed by: INTERNAL MEDICINE

## 2024-01-15 PROCEDURE — 250N000013 HC RX MED GY IP 250 OP 250 PS 637: Performed by: INTERNAL MEDICINE

## 2024-01-15 PROCEDURE — 120N000002 HC R&B MED SURG/OB UMMC

## 2024-01-15 PROCEDURE — 97110 THERAPEUTIC EXERCISES: CPT | Mod: GO

## 2024-01-15 PROCEDURE — 87106 FUNGI IDENTIFICATION YEAST: CPT | Performed by: INTERNAL MEDICINE

## 2024-01-15 PROCEDURE — 85027 COMPLETE CBC AUTOMATED: CPT | Performed by: INTERNAL MEDICINE

## 2024-01-15 PROCEDURE — 250N000011 HC RX IP 250 OP 636: Performed by: INTERNAL MEDICINE

## 2024-01-15 PROCEDURE — 250N000013 HC RX MED GY IP 250 OP 250 PS 637: Performed by: STUDENT IN AN ORGANIZED HEALTH CARE EDUCATION/TRAINING PROGRAM

## 2024-01-15 PROCEDURE — 81001 URINALYSIS AUTO W/SCOPE: CPT | Performed by: INTERNAL MEDICINE

## 2024-01-15 RX ORDER — ONDANSETRON 4 MG/1
4 TABLET, ORALLY DISINTEGRATING ORAL EVERY 6 HOURS PRN
Status: DISCONTINUED | OUTPATIENT
Start: 2024-01-15 | End: 2024-01-20 | Stop reason: HOSPADM

## 2024-01-15 RX ADMIN — ACYCLOVIR 400 MG: 400 TABLET ORAL at 20:34

## 2024-01-15 RX ADMIN — FAMOTIDINE 40 MG: 20 TABLET ORAL at 21:56

## 2024-01-15 RX ADMIN — BACLOFEN 10 MG: 10 TABLET ORAL at 18:22

## 2024-01-15 RX ADMIN — ACYCLOVIR 400 MG: 400 TABLET ORAL at 09:06

## 2024-01-15 RX ADMIN — ACETAMINOPHEN 975 MG: 325 TABLET, FILM COATED ORAL at 09:06

## 2024-01-15 RX ADMIN — BACLOFEN 20 MG: 20 TABLET ORAL at 09:05

## 2024-01-15 RX ADMIN — DULOXETINE HYDROCHLORIDE 80 MG: 60 CAPSULE, DELAYED RELEASE ORAL at 09:05

## 2024-01-15 RX ADMIN — LIDOCAINE 3 PATCH: 4 PATCH TOPICAL at 20:34

## 2024-01-15 RX ADMIN — BACLOFEN 30 MG: 20 TABLET ORAL at 21:56

## 2024-01-15 RX ADMIN — ENOXAPARIN SODIUM 40 MG: 40 INJECTION SUBCUTANEOUS at 09:07

## 2024-01-15 RX ADMIN — MAGNESIUM HYDROXIDE 30 ML: 400 SUSPENSION ORAL at 18:22

## 2024-01-15 RX ADMIN — OXYCODONE HYDROCHLORIDE 5 MG: 5 TABLET ORAL at 20:33

## 2024-01-15 RX ADMIN — LEVOTHYROXINE SODIUM 100 MCG: 100 TABLET ORAL at 09:10

## 2024-01-15 RX ADMIN — GABAPENTIN 300 MG: 300 CAPSULE ORAL at 09:06

## 2024-01-15 RX ADMIN — Medication 600 MG: at 11:26

## 2024-01-15 RX ADMIN — GABAPENTIN 300 MG: 300 CAPSULE ORAL at 20:34

## 2024-01-15 RX ADMIN — LATANOPROST 1 DROP: 50 SOLUTION OPHTHALMIC at 09:10

## 2024-01-15 RX ADMIN — ACETAMINOPHEN 975 MG: 325 TABLET, FILM COATED ORAL at 18:21

## 2024-01-15 ASSESSMENT — ACTIVITIES OF DAILY LIVING (ADL)
ADLS_ACUITY_SCORE: 57

## 2024-01-15 NOTE — PROGRESS NOTES
[facility]    Medicine Progress Note - Hospitalist Service, GOLD TEAM 16    Date of Admission:  1/5/2024    Assessment & Plan      Marylee Woods is a 68 year old female admitted on 1/5/2024. She has a history of Graves disease s/p ablation , HTN, CKD< MARYLU, depression, grade 2 DCIS of L breast, GERD, osteoporosis, and Multiple Sclerosis c/b neurogenic bladder and is admitted for severe hyponatremia, also found to have new pressure ulcers present on admission.       # Hyponatremia - resolved   - On admission, patient found to be hyponatremic to 119.   - Was given IV fluids and 3% saline in the [facility] with improvement that has continued with gentle administration of normal saline.   -Initially, hyponatremia thought to be due to volume depletion for which patient required normal saline infusion.    -Subsequently became hyponatremic again with culprit being SIADH, hence, patient had to be placed on fluid restriction with subsequent improvement in her sodium at this point.  - Na has since returned to normal levels   HCTZ discontinued.   Fluid restriction now 1.5 L     # Recent right distal femur fracture  # Recent left medial malleolus fracture  - Patient to be NWB for 8 weeks (until at least 1/11) but scheduled to see ortho on 1/24.    - Ortho reconsulted, they are now recommending nonweightbearing until clinic follow-up    # Cystitis with hematuria  # Pyuria   - Patient was started on Keflex 500 mg prior to admission and vancomycin/ceftriaxone after admission.  - She recieived x4 days of CTX. Last dose of CTX 1/9/24.   - Urine cx this admission was negative for bacterial growth.     # Sacral wounds, present on admission  # Coccyx left IT  - Patient not able to mobilize well and has been resisting turns at home.    - Suspect a combination of poor mobility as well as suboptimal nutrition.  - would consult, recs below  - Change dressings every 3 days  - Cleanse the  area with wound cleanser and pat dry.  - Apply No sting film barrier to periwound skin.  - Cover wound with Sacral Mepilex or mepilex (Left IT)  - Turn and reposition Q 2hrs side to side only.  - Avoid HOB greather than 30 except for meals. Lower HOB when done eating.   - Ensure pt has Redd-cushion while sitting up in the chair.    # Lactic acidosis - resolved.     # Acute kidney injury on chronic kidney disease - DENNISE resolved.     # Multiple sclerosis  # Neurogenic bladder  -Of note, she has received Lemtrada in 2017 & 2019.  Continue baclofen 20 mg every morning, 30 mg every afternoon.    # Hx of Graves s/p thyroid ablation  -TSH elevated to 52 on admission but has been missing doses  -repeat in 2-4 weeks  -Continue PTA levothyroxine    # HSV prophylaxis   -Continue pta acyclovir 400mg    #  Anemia of chronic disease  Admission Hgb of 10.2. and stable  - follow     # MARYLU   -Patient on BiPAP at night.  Order was placed and RT will adjust settings for BiPAP.          Diet: Regular Diet Adult  Snacks/Supplements Adult: Ensure Enlive; Between Meals  Fluid restriction 1500 ML FLUID    DVT Prophylaxis: Pneumatic Compression Devices  Verduzco Catheter: Not present  Lines: None     Cardiac Monitoring: None  Code Status: Full Code      Clinically Significant Risk Factors                          # Moderate Malnutrition: based on nutrition assessment      # Financial/Environmental Concerns: none         Disposition Plan  Seen by PT, recommending home with therapy, OT recommending TCU.       GALI TRUONG MD  Hospitalist Service, GOLD TEAM [address] [place] [address]  Securely message with [employer] (more info)  Text page via [facility] Paging/Directory   See signed in provider for up to date coverage information  ______________________________________________________________________    Interval History   -No acute events overnight  -Patient reporting some burning with urination, urinalysis  obtained  -She understands she will have to go home if no TCU acceptance.  Of note, only 1 place is still pending at this time.    Physical Exam   Vital Signs: Temp: 98.6  F (37  C) Temp src: Oral BP: 116/63 Pulse: 90   Resp: 18 SpO2: 99 % O2 Device: None (Room air)    Weight: 140 lbs 0 oz    General Appearance: Alert, room air, no acute distress.   Respiratory: clear to auscultation bilaterally with good air entry   Cardiovascular: RRR.  Soft systolic murmur, no rubs, or gallops   GI: soft, non-tender, non-distended.  No hepatosplenomegaly or mass.  Other: Right lower extremity in hard cast.  Patient able to move bilateral toes.     Medical Decision Making             Data         Imaging results reviewed over the past 24 hrs:   No results found for this or any previous visit (from the past 24 hour(s)).

## 2024-01-15 NOTE — PLAN OF CARE
[id number]    Pt is Aox4. Not oob this shift. Denies pain, sob, n/v. T/R q 2hr as pt allows. Bipap on overnight. Refused purewick. Brief in place. Foam dressing to coccyx CDI. Cast in place to RLE. R PIV SL. Sleeping between cares. Discharge pending placement.

## 2024-01-15 NOTE — PROGRESS NOTES
Care Management Follow Up    Length of Stay (days): 9    Expected Discharge Date:  Unknown     Concerns to be Addressed: discharge planning       Patient plan of care discussed at interdisciplinary rounds: Yes    Anticipated Discharge Disposition: Home     Anticipated Discharge Services: Therapies    Anticipated Discharge DME: TBD    Patient/family educated on Medicare website which has current facility and service quality ratings: yes    Education Provided on the Discharge Plan: Yes    Patient/Family in Agreement with the Plan: yes    Referrals Placed by CM/SW:     Pending:  S[facility]ivasa T[facility]  [place]  P: [phone]  F: [phone]  1/13: Left VM.  1/15: Left VM.    Declined:  [facility]  [address] [place] [address]  P: [phone]  F: [phone]  1/15: Declined via Epic message: cannot meet patient's needs    [facility]  [address] [place] [address]  P: [phone]  Admissions: [phone]  F: [phone]  1/15: Declined via Epic message: poor rehab candidate while NWB, unable to skill until weightbearing changes, no private pay funds to bridge gap until WB status changes    [address] [address]  P: [phone]   F: [phone]  1/13: Referral sent  1/15: Spoke with Admissions; they have no openings for 1-2 weeks.     TCU  P: [phone]  1/13: Declined due to no skilled need/no therapy goals to be working on.     [facility]  [address]   [place] [address]  P: [phone]  F: [phone]  1/13: Declined due to cannot meet patient's needs.    [facility]  P: [phone]  F: [phone]  1/11: Declined due to acuity too high/cannot meet patient's needs.    Private pay costs discussed:  Not at this time    Additional Information: SW followed up on pending TCU referrals. There is no accepting facility. SW informed provider Kanu Vazquez.    Paola Collins LGSW  [facility]    Covering for Taylor Gardner - Phone: [phone]

## 2024-01-16 ENCOUNTER — APPOINTMENT (OUTPATIENT)
Dept: PHYSICAL THERAPY | Facility: CLINIC | Age: 69
DRG: 644 | End: 2024-01-16
Payer: MEDICARE

## 2024-01-16 LAB
ANION GAP SERPL CALCULATED.3IONS-SCNC: 5 MMOL/L (ref 7–15)
BUN SERPL-MCNC: 20.5 MG/DL (ref 8–23)
CALCIUM SERPL-MCNC: 9.1 MG/DL (ref 8.8–10.2)
CHLORIDE SERPL-SCNC: 105 MMOL/L (ref 98–107)
CREAT SERPL-MCNC: 0.73 MG/DL (ref 0.51–0.95)
DEPRECATED HCO3 PLAS-SCNC: 29 MMOL/L (ref 22–29)
EGFRCR SERPLBLD CKD-EPI 2021: 89 ML/MIN/1.73M2
GLUCOSE SERPL-MCNC: 94 MG/DL (ref 70–99)
POTASSIUM SERPL-SCNC: 4.1 MMOL/L (ref 3.4–5.3)
SODIUM SERPL-SCNC: 139 MMOL/L (ref 135–145)

## 2024-01-16 PROCEDURE — 80048 BASIC METABOLIC PNL TOTAL CA: CPT | Performed by: INTERNAL MEDICINE

## 2024-01-16 PROCEDURE — 250N000013 HC RX MED GY IP 250 OP 250 PS 637: Performed by: INTERNAL MEDICINE

## 2024-01-16 PROCEDURE — 120N000002 HC R&B MED SURG/OB UMMC

## 2024-01-16 PROCEDURE — 250N000013 HC RX MED GY IP 250 OP 250 PS 637: Performed by: STUDENT IN AN ORGANIZED HEALTH CARE EDUCATION/TRAINING PROGRAM

## 2024-01-16 PROCEDURE — 250N000013 HC RX MED GY IP 250 OP 250 PS 637

## 2024-01-16 PROCEDURE — 250N000011 HC RX IP 250 OP 636: Performed by: INTERNAL MEDICINE

## 2024-01-16 PROCEDURE — 97110 THERAPEUTIC EXERCISES: CPT | Mod: GP

## 2024-01-16 PROCEDURE — 99231 SBSQ HOSP IP/OBS SF/LOW 25: CPT | Performed by: INTERNAL MEDICINE

## 2024-01-16 PROCEDURE — 36415 COLL VENOUS BLD VENIPUNCTURE: CPT | Performed by: INTERNAL MEDICINE

## 2024-01-16 PROCEDURE — 97530 THERAPEUTIC ACTIVITIES: CPT | Mod: GP

## 2024-01-16 RX ORDER — CEFTRIAXONE 1 G/1
1 INJECTION, POWDER, FOR SOLUTION INTRAMUSCULAR; INTRAVENOUS EVERY 24 HOURS
Status: DISCONTINUED | OUTPATIENT
Start: 2024-01-16 | End: 2024-01-17

## 2024-01-16 RX ADMIN — Medication 600 MG: at 12:33

## 2024-01-16 RX ADMIN — ACETAMINOPHEN 975 MG: 325 TABLET, FILM COATED ORAL at 10:31

## 2024-01-16 RX ADMIN — GABAPENTIN 300 MG: 300 CAPSULE ORAL at 10:34

## 2024-01-16 RX ADMIN — GABAPENTIN 300 MG: 300 CAPSULE ORAL at 21:10

## 2024-01-16 RX ADMIN — LIDOCAINE 3 PATCH: 4 PATCH TOPICAL at 21:07

## 2024-01-16 RX ADMIN — ACYCLOVIR 400 MG: 400 TABLET ORAL at 10:33

## 2024-01-16 RX ADMIN — DULOXETINE HYDROCHLORIDE 80 MG: 60 CAPSULE, DELAYED RELEASE ORAL at 10:33

## 2024-01-16 RX ADMIN — LATANOPROST 1 DROP: 50 SOLUTION OPHTHALMIC at 10:42

## 2024-01-16 RX ADMIN — MAGNESIUM HYDROXIDE 30 ML: 400 SUSPENSION ORAL at 21:10

## 2024-01-16 RX ADMIN — ENOXAPARIN SODIUM 40 MG: 40 INJECTION SUBCUTANEOUS at 10:34

## 2024-01-16 RX ADMIN — BACLOFEN 20 MG: 20 TABLET ORAL at 10:37

## 2024-01-16 RX ADMIN — FAMOTIDINE 40 MG: 20 TABLET ORAL at 21:14

## 2024-01-16 RX ADMIN — BACLOFEN 30 MG: 20 TABLET ORAL at 21:08

## 2024-01-16 RX ADMIN — CEFTRIAXONE SODIUM 1 G: 1 INJECTION, POWDER, FOR SOLUTION INTRAMUSCULAR; INTRAVENOUS at 10:58

## 2024-01-16 RX ADMIN — LEVOTHYROXINE SODIUM 100 MCG: 100 TABLET ORAL at 10:43

## 2024-01-16 RX ADMIN — ACETAMINOPHEN 975 MG: 325 TABLET, FILM COATED ORAL at 01:48

## 2024-01-16 RX ADMIN — ONDANSETRON 4 MG: 4 TABLET, ORALLY DISINTEGRATING ORAL at 13:42

## 2024-01-16 RX ADMIN — ACETAMINOPHEN 975 MG: 325 TABLET, FILM COATED ORAL at 19:25

## 2024-01-16 RX ADMIN — ACYCLOVIR 400 MG: 400 TABLET ORAL at 21:10

## 2024-01-16 ASSESSMENT — ACTIVITIES OF DAILY LIVING (ADL)
ADLS_ACUITY_SCORE: 61
ADLS_ACUITY_SCORE: 57
ADLS_ACUITY_SCORE: 57
ADLS_ACUITY_SCORE: 61
ADLS_ACUITY_SCORE: 57
ADLS_ACUITY_SCORE: 57
ADLS_ACUITY_SCORE: 61
ADLS_ACUITY_SCORE: 61
ADLS_ACUITY_SCORE: 57
ADLS_ACUITY_SCORE: 61

## 2024-01-16 NOTE — PROGRESS NOTES
Pt is alert&ox4, can make needs known. Pt denies chest pain, SOB, and N/V. Pt is on RA.     Bipap overnight. Brief on. C/o burning with voiding and had small dark/tarry. Notified provider. Hgb and VS within normal range. UA to be collected, needs to be straight cath. Order in place. Passed on to oncoming RN.    Cast on R leg. Pressure ulcer on sacrum/coccyx, L IT. Dressings changed every thirday. Changed sacrum one today after having small bowel movement.     Pt c/o constipation. MOM given.     Pt c/o nausea end of shift, informed provider, and Zofran order. Passed on to oncoming RN.     Pain managed with tylenol and baclofen.    Q 2hr repositioning  with pillows in place.   Rpiv SL  P:pending placement, continue with plan of care

## 2024-01-16 NOTE — PROGRESS NOTES
Care Management Follow Up    Length of Stay (days): 10    Expected Discharge Date:  Unknown     Concerns to be Addressed: discharge planning       Patient plan of care discussed at interdisciplinary rounds: Yes    Anticipated Discharge Disposition: Home     Anticipated Discharge Services: Therapies    Anticipated Discharge DME: TBD    Patient/family educated on Medicare website which has current facility and service quality ratings: yes    Education Provided on the Discharge Plan: Yes    Patient/Family in Agreement with the Plan: yes    Referrals Placed by CM/SW:     Pending:  [facility]iv[facility] [place]  P: [phone]  F: [phone]  1/13: Left VM.  1/15: Left VM.  1/16: Spoke with Admissions (see below for details) then did not hear back. Left VM.     Declined:  [facility]  [address]  [place] [address]  P: [phone]  F: [phone]  1/15: Declined via Epic message: cannot meet patient's needs     [facility]  [address] [place] [address]  P: [phone]  Admissions: [phone]  F: [phone]  1/15: Declined via Epic message: poor rehab candidate while NWB, unable to skill until weightbearing changes, no private pay funds to bridge gap until WB status changes     [facility]  [address] [facility] [address]  P: [phone]   F: [phone]  1/13: Referral sent  1/15: Spoke with Admissions; they have no openings for 1-2 weeks.      TCU  P: [phone]  1/13: Declined due to no skilled need/no therapy goals to be working on.     [facility]  [address] [place] [address]  P: [phone]  F: [phone]  1/13: Declined due to cannot meet patient's needs.     [facility]  P: [phone]  F: [phone]  1/11: Declined due to acuity too high/cannot meet patient's needs.    Private pay costs discussed:  Not at this time    Additional Information:  SW spoke with Vanessa in Admissions at  "[facility]. Vanessa reported that this pt is \"a friend\" of someone who works there. Vanessa would like to review updated clinicals for Marylee. JUSTIN sent referral via [employer].    A couple hours passed. JUSTIN did not hear back from Vanessa. JUSTIN left a voicemail requesting to know if upon additional review pt is accepted or declined.    Paola Collins, [facility]   Covering for Taylor Gardner - Phone: [phone]    "

## 2024-01-16 NOTE — PROGRESS NOTES
Pt is alert&ox4, can make needs known. Pt denies chest pain, SOB. Pt is on RA.     Bipap overnight.   Cast on R leg. Pressure ulcer on sacrum/coccyx, L IT Dressings changed every thirday. Both done today    Pt c/o nausea and dizziness, informed provider, and was given Zofran. VS stable.     Pain managed with tylenol and baclofen.    Q 2hr repositioning  with pillows in place.   Rpiv SL    Was in chair few hours today.    P:pending placement, continue with plan of care

## 2024-01-16 NOTE — PROGRESS NOTES
[facility]    Medicine Progress Note - Hospitalist Service, GOLD TEAM 18    Date of Admission:  1/5/2024    Assessment & Plan      Marylee Woods is a 68 year old female admitted on 1/5/2024. She has a history of Graves disease s/p ablation , HTN, CKD< MARYLU, depression, grade 2 DCIS of L breast, GERD, osteoporosis, and Multiple Sclerosis c/b neurogenic bladder and is admitted for severe hyponatremia, also found to have new pressure ulcers present on admission.       # Hyponatremia - resolved   - On admission, patient found to be hyponatremic to 119.   - Was given IV fluids and 3% saline in the [facility] Department with improvement that has continued with gentle administration of normal saline.   -Initially, hyponatremia thought to be due to volume depletion for which patient required normal saline infusion.    -Subsequently became hyponatremic again with culprit being SIADH, hence, patient had to be placed on fluid restriction with subsequent improvement in her sodium at this point.  - Na has since returned to normal levels   HCTZ discontinued.   Fluid restriction now 1.5 L     # Recent right distal femur fracture  # Recent left medial malleolus fracture  - Patient to be NWB for 8 weeks (until at least 1/11) but scheduled to see ortho on 1/24.    - Ortho reconsulted, they are now recommending nonweightbearing until clinic follow-up    # Cystitis with hematuria  # Pyuria   - Patient was started on Keflex 500 mg prior to admission and vancomycin/ceftriaxone after admission.  - She recieived x4 days of CTX. Last dose of CTX 1/9/24.   - Urine cx this admission was negative for bacterial growth.   - Patient again reported dysuria on 1/15/24, with abnormal urinalysis for which CTX has been restarted again  Follow-up urine cx    # Sacral wounds, present on admission  # Coccyx left IT  - Patient not able to mobilize well and has been resisting turns at home.    - Suspect a  combination of poor mobility as well as suboptimal nutrition.  - would consult, recs below  - Change dressings every 3 days  - Cleanse the area with wound cleanser and pat dry.  - Apply No sting film barrier to periwound skin.  - Cover wound with Sacral Mepilex or mepilex (Left IT)  - Turn and reposition Q 2hrs side to side only.  - Avoid HOB greather than 30 except for meals. Lower HOB when done eating.   - Ensure pt has Redd-cushion while sitting up in the chair.    # Lactic acidosis - resolved.     # Acute kidney injury on chronic kidney disease - DENNISE resolved.     # Multiple sclerosis  # Neurogenic bladder  -Of note, she has received Lemtrada in 2017 & 2019.  Continue baclofen 20 mg every morning, 30 mg every afternoon.    # Hx of Graves s/p thyroid ablation  -TSH elevated to 52 on admission but has been missing doses  -repeat in 2-4 weeks  -Continue PTA levothyroxine    # HSV prophylaxis   -Continue pta acyclovir 400mg    #  Anemia of chronic disease  Admission Hgb of 10.2. and stable  - follow     # MARYLU   -Patient on BiPAP at night.  Order was placed and RT will adjust settings for BiPAP.          Diet: Regular Diet Adult  Snacks/Supplements Adult: Ensure Enlive; Between Meals  Fluid restriction 1500 ML FLUID    DVT Prophylaxis: Pneumatic Compression Devices  Verduzco Catheter: Not present  Lines: None     Cardiac Monitoring: None  Code Status: Full Code      Clinically Significant Risk Factors                          # Moderate Malnutrition: based on nutrition assessment      # Financial/Environmental Concerns: none         Disposition Plan  Anticipate discharge back home on 1/17/24.       GALI TRUONG MD  Hospitalist Service, GOLD TEAM [address] [place] [address]  Securely message with [employer] (more info)  Text page via [facility] Paging/Directory   See signed in provider for up to date coverage  information  ______________________________________________________________________    Interval History   -No acute events overnight  -Still reporting some burning with urination.   -Initiated on CTX today.     Physical Exam   Vital Signs: Temp: 98.7  F (37.1  C) Temp src: Oral BP: 99/55 Pulse: 75   Resp: 18 SpO2: 99 % O2 Device: None (Room air)    Weight: 140 lbs 0 oz    General Appearance: Alert, room air, no acute distress.   Respiratory: clear to auscultation bilaterally with good air entry   Cardiovascular: RRR.  Soft systolic murmur, no rubs, or gallops   GI: soft, non-tender, non-distended.  No hepatosplenomegaly or mass.  Other: Right lower extremity in hard cast.  Patient able to move bilateral toes.     Medical Decision Making             Data     I have personally reviewed the following data over the past 24 hrs:    4.4  \   9.3 (L)   / 235     139 105 20.5 /  94   4.1 29 0.73 \       Imaging results reviewed over the past 24 hrs:   No results found for this or any previous visit (from the past 24 hour(s)).

## 2024-01-16 NOTE — PROGRESS NOTES
Pt has no acute changes during this shift.  Pt is alert and Oriented x4. Denied SOB or chest pain. Bipap used overnight. Pt reported burning sensation during straight cath. Formed, Dark tarry stool noted on bm. Denied n/v. Urine specimen sent to the lab. Cast on R leg. Pt denied numbness and tingling on her right leg. Pt repositioned and pain managed with Tylenol and pillow in place. Plan of care continues.

## 2024-01-17 ENCOUNTER — APPOINTMENT (OUTPATIENT)
Dept: OCCUPATIONAL THERAPY | Facility: CLINIC | Age: 69
DRG: 644 | End: 2024-01-17
Payer: MEDICARE

## 2024-01-17 LAB
BACTERIA UR CULT: ABNORMAL
PLATELET # BLD AUTO: 174 10E3/UL (ref 150–450)

## 2024-01-17 PROCEDURE — 250N000011 HC RX IP 250 OP 636: Performed by: INTERNAL MEDICINE

## 2024-01-17 PROCEDURE — 85049 AUTOMATED PLATELET COUNT: CPT | Performed by: INTERNAL MEDICINE

## 2024-01-17 PROCEDURE — 250N000013 HC RX MED GY IP 250 OP 250 PS 637: Performed by: INTERNAL MEDICINE

## 2024-01-17 PROCEDURE — 97110 THERAPEUTIC EXERCISES: CPT | Mod: GO

## 2024-01-17 PROCEDURE — 36415 COLL VENOUS BLD VENIPUNCTURE: CPT | Performed by: INTERNAL MEDICINE

## 2024-01-17 PROCEDURE — 97530 THERAPEUTIC ACTIVITIES: CPT | Mod: GO

## 2024-01-17 PROCEDURE — 99232 SBSQ HOSP IP/OBS MODERATE 35: CPT | Performed by: INTERNAL MEDICINE

## 2024-01-17 PROCEDURE — 250N000013 HC RX MED GY IP 250 OP 250 PS 637

## 2024-01-17 PROCEDURE — 250N000013 HC RX MED GY IP 250 OP 250 PS 637: Performed by: STUDENT IN AN ORGANIZED HEALTH CARE EDUCATION/TRAINING PROGRAM

## 2024-01-17 PROCEDURE — 120N000002 HC R&B MED SURG/OB UMMC

## 2024-01-17 RX ORDER — FLUCONAZOLE 100 MG/1
200 TABLET ORAL DAILY
Status: DISCONTINUED | OUTPATIENT
Start: 2024-01-17 | End: 2024-01-19

## 2024-01-17 RX ADMIN — ACETAMINOPHEN 975 MG: 325 TABLET, FILM COATED ORAL at 16:12

## 2024-01-17 RX ADMIN — FAMOTIDINE 40 MG: 20 TABLET ORAL at 21:21

## 2024-01-17 RX ADMIN — LEVOTHYROXINE SODIUM 100 MCG: 100 TABLET ORAL at 08:13

## 2024-01-17 RX ADMIN — CEFTRIAXONE SODIUM 1 G: 1 INJECTION, POWDER, FOR SOLUTION INTRAMUSCULAR; INTRAVENOUS at 10:29

## 2024-01-17 RX ADMIN — ACYCLOVIR 400 MG: 400 TABLET ORAL at 21:08

## 2024-01-17 RX ADMIN — LATANOPROST 1 DROP: 50 SOLUTION OPHTHALMIC at 08:13

## 2024-01-17 RX ADMIN — Medication 600 MG: at 10:27

## 2024-01-17 RX ADMIN — POLYETHYLENE GLYCOL 3350 17 G: 17 POWDER, FOR SOLUTION ORAL at 08:08

## 2024-01-17 RX ADMIN — DULOXETINE HYDROCHLORIDE 80 MG: 60 CAPSULE, DELAYED RELEASE ORAL at 08:08

## 2024-01-17 RX ADMIN — ENOXAPARIN SODIUM 40 MG: 40 INJECTION SUBCUTANEOUS at 08:08

## 2024-01-17 RX ADMIN — LIDOCAINE 3 PATCH: 4 PATCH TOPICAL at 21:09

## 2024-01-17 RX ADMIN — GABAPENTIN 300 MG: 300 CAPSULE ORAL at 08:08

## 2024-01-17 RX ADMIN — BACLOFEN 20 MG: 20 TABLET ORAL at 08:08

## 2024-01-17 RX ADMIN — ACETAMINOPHEN 975 MG: 325 TABLET, FILM COATED ORAL at 08:13

## 2024-01-17 RX ADMIN — BACLOFEN 30 MG: 20 TABLET ORAL at 21:21

## 2024-01-17 RX ADMIN — ACYCLOVIR 400 MG: 400 TABLET ORAL at 08:08

## 2024-01-17 RX ADMIN — Medication 2.5 MG: at 00:36

## 2024-01-17 RX ADMIN — ACETAMINOPHEN 975 MG: 325 TABLET, FILM COATED ORAL at 00:32

## 2024-01-17 RX ADMIN — Medication 2.5 MG: at 03:17

## 2024-01-17 RX ADMIN — GABAPENTIN 300 MG: 300 CAPSULE ORAL at 21:08

## 2024-01-17 RX ADMIN — FLUCONAZOLE 200 MG: 100 TABLET ORAL at 16:12

## 2024-01-17 RX ADMIN — BACLOFEN 10 MG: 10 TABLET ORAL at 03:17

## 2024-01-17 ASSESSMENT — ACTIVITIES OF DAILY LIVING (ADL)
ADLS_ACUITY_SCORE: 57
ADLS_ACUITY_SCORE: 61
ADLS_ACUITY_SCORE: 57

## 2024-01-17 NOTE — PROGRESS NOTES
[facility]    Medicine Progress Note - Hospitalist Service, GOLD TEAM 18    Date of Admission:  1/5/2024    Assessment & Plan      Marylee Woods is a 68 year old female admitted on 1/5/2024. She has a history of Graves disease s/p ablation , HTN, CKD< MARYLU, depression, grade 2 DCIS of L breast, GERD, osteoporosis, and Multiple Sclerosis c/b neurogenic bladder.  Patient presented to the hospital on 1/5/2024 from clinic due to confusion and was found to have acute symptomatic hyponatremia.  Hyponatremia has since resolved and mental status is back to baseline.      # Hyponatremia - resolved   -Initially due to hypovolemic hyponatremia in the setting of use of hydrochlorothiazide which has since been discontinued this admission.  She also had elements of SIADH and has been on fluid restriction with sodium remained in normal.  Discontinued adequate thiazide  Continue fluid restriction at 1.5 L.    # Recent right distal femur fracture  # Recent left medial malleolus fracture  -Per orthopedic surgery, patient to be NWB for 8 weeks initially until at least 1/11; however, following discussion with orthopedic surgery this admission, patient is to continue nonweightbearing status until she sees orthopedic surgery in clinic currently scheduled for 1/24/2024.        # Cystitis with hematuria  # Pyuria   -Prior to this admission, patient was started on Keflex for management of UTI.  Initially in the ER, she received Vanco and Zosyn but completed her antibiotic course with ceftriaxone on 1/9/2024.  -Patient also reporting some dysuria as of 1/15/2024.  Urinalysis obtained appears infectious, she started on ceftriaxone again.  -Urine culture this admission now growing candida albicans x2; given complaints of dysuria and patient's overall clinical picture,   Start fluconazole 200 mg every day x14 days 9EKG on admission Qtc 444)  Stop CTX       # Sacral wounds, present on admission  #  Coccyx left IT  - Patient not able to mobilize well and has been resisting turns at home.    - Suspect a combination of poor mobility as well as suboptimal nutrition.  - would consult, recs below  - Change dressings every 3 days  - Cleanse the area with wound cleanser and pat dry.  - Apply No sting film barrier to periwound skin.  - Cover wound with Sacral Mepilex or mepilex (Left IT)  - Turn and reposition Q 2hrs side to side only.  - Avoid HOB greather than 30 except for meals. Lower HOB when done eating.   - Ensure pt has Redd-cushion while sitting up in the chair.    # Lactic acidosis - resolved.     # Acute kidney injury on chronic kidney disease - DENNISE resolved.     # Multiple sclerosis  # Neurogenic bladder  -Of note, she has received Lemtrada in 2017 & 2019.  Continue baclofen 20 mg every morning, 30 mg every afternoon.    # Hx of Graves s/p thyroid ablation  -TSH elevated to 52 on admission but has been missing doses  -repeat in 2-4 weeks  -Continue PTA levothyroxine    # HSV prophylaxis   -Continue pta acyclovir 400mg    #  Anemia of chronic disease  Admission Hgb of 10.2. and stable  - follow     # MARYLU   -Patient on BiPAP at night.  Order was placed and RT will adjust settings for BiPAP.          Diet: Regular Diet Adult  Snacks/Supplements Adult: Ensure Enlive; Between Meals  Fluid restriction 1500 ML FLUID    DVT Prophylaxis: Pneumatic Compression Devices  Verduzco Catheter: Not present  Lines: None     Cardiac Monitoring: None  Code Status: Full Code      Clinically Significant Risk Factors                          # Moderate Malnutrition: based on nutrition assessment      # Financial/Environmental Concerns: none         Disposition Plan  Anticipate discharge back home on 1/17/24.       GALI TRUONG MD  Hospitalist Service, GOLD TEAM [address] [place] [address]  Securely message with [employer] (more info)  Text page via [facility] Paging/Directory   See signed in provider for up  to date coverage information  ______________________________________________________________________    Interval History   -No acute events overnight  -RNs notes reviewed, no acute issues.  -She has been able to sit on her chair for a few hours.    Physical Exam   Vital Signs: Temp: 97.5  F (36.4  C) Temp src: Axillary BP: (!) 86/54 Pulse: 77   Resp: 18 SpO2: 100 % O2 Device: BiPAP/CPAP    Weight: 140 lbs 0 oz    General Appearance: Alert, room air, no acute distress.   Respiratory: clear to auscultation bilaterally with good air entry   Cardiovascular: RRR.  Soft systolic murmur, no rubs, or gallops   GI: soft, non-tender, non-distended.  No hepatosplenomegaly or mass.  Other: Right lower extremity in hard cast.  Patient able to move bilateral toes.     Medical Decision Making             Data     I have personally reviewed the following data over the past 24 hrs:    N/A  \   N/A   / N/A     139 105 20.5 /  94   4.1 29 0.73 \       Imaging results reviewed over the past 24 hrs:   No results found for this or any previous visit (from the past 24 hour(s)).

## 2024-01-17 NOTE — PROGRESS NOTES
"Care Management Follow Up    Length of Stay (days): 11    Expected Discharge Date: 2024     Concerns to be Addressed: discharge planning     Patient plan of care discussed at interdisciplinary rounds: Yes    Anticipated Discharge Disposition: Transitional Care     Anticipated Discharge Services:    Anticipated Discharge DME:      Patient/family educated on Medicare website which has current facility and service quality ratings: yes  Education Provided on the Discharge Plan: Yes  Patient/Family in Agreement with the Plan: yes    Referrals Placed by CM/SW: External Care Coordination  Private pay costs discussed: Not applicable    Additional Information:  As discussed in rounds patient is medically ready for discharge.    The recommendation is for the patient to discharge home with home care PT/OT. Writer sent referral to [facility].    There is still one TCU referral pending. Writer called [facility] and left a voicemail for admissions to discuss placement.    Writer left voicemail for , Manuel, to discuss discharge planning.    Patient will need stretcher transport upon discharge.    14:45: Accepting home care agency - [facility].  Phone: [phone]  Fax: [phone]    15:30: Writer had gone to talk to patient about discharging. Writer allowed patient to express her feelings about discharging home rather than the TCU. She stated, \"I am sad.\" Patient stated that her cousin may be able to assist and her son may be able to assist if he gets training. She requested a list of PCA companies who can help her. Patient acknowledged understanding that she has to discharge home.  Wrier explained that unable to get a hold of patient's spouse. Patient explained that her spouse has been busy today with  planning and he may get back later.  Writer discussed home care. Patient stated that she does not wish to use [facility] services anymore and she wants a " different home care agency. Reached out to coordinator at the HUB to send out more referrals.  Writer cuca Davidson RN liaison with [facility] and she will have management reach out to the patient about her grievances.     16:45: left another voicemail for  to discuss discharge.    Accepting Home Care Agency  Accepting home healthcare agency  [facility]  [address] [place] [address]  Phone: [phone]  Fax:[phone]    Pending:  [facility]  P: [phone]  F: [phone]     Declined:  [facility]  [address] [place] [address]  P: [phone]  F: [phone]  1/15: Declined via Epic message: cannot meet patient's needs     [facility]  [address] [place] [address]  P: [phone]  Admissions: [phone]  F: [phone]  1/15: Declined via Epic message: poor rehab candidate while NWB, unable to skill until weightbearing changes, no private pay funds to bridge gap until WB status changes     [facility]  [address] [place] [address]  P: [phone]   F: [phone]  1/13: Referral sent  1/15: Spoke with Admissions; they have no openings for 1-2 weeks.     [facility]  P: [phone]  1/13: Declined due to no skilled need/no therapy goals to be working on.     [facility]  [address] [place] [address]  P: [phone]  F: [phone]  1/13: Declined due to cannot meet patient's needs.     [facility]  P: [phone]  F: [phone]  1/11: Declined due to acuity too high/cannot meet patient's needs.      Elly Pulliam, MSN, APRN, AGCNS-BC   5MS [phone]  Nurse Coordinator    SEARCHABLE in [facility] - search CARE COORDINATOR     For Weekend & Holiday on call RN Care Coordinator:  (Tasks: Home care, home infusion, medical equipment, transportation, IMM & GONZALEZ forms, etc.)  [place] (0800 - 1630) Saturday and Sunday    Units: 5A, 5B, & 5C: [phone]     Units: 6B, 6C, 6D: [phone]    Units: 7A, 7B, 7C, 7D: [phone]    Units: 6A/ICU : [phone]     [facility] ([phone]) Saturday and Sunday    Units: 6 Med/Surg, 8A, 10 ICU, & Pediatric Units: [phone]    Units: 5 Ortho, 5Med/Surg &  ED: [phone]

## 2024-01-17 NOTE — PROGRESS NOTES
No acute changes during this shift.  Pt is alert and Oriented x4 and can make needs known. Denied SOB or chest pain. Bipap used overnight. No bm overnight, PRN Milk of magnesia administered.   Denied n/v. Cast on R leg. Mepilex on Left heel and Sacrum/Coccyx.  Pt denied numbness and tingling on her right leg. Pt repositioned with pillows in place. Pt BP was 99/56, 86/54 overnight. Left forearm is PIV Saline Locked. Pt  pain managed with Tylenol and Oxycodone. POC

## 2024-01-17 NOTE — DISCHARGE INSTRUCTIONS
Accepting home healthcare agency  [facility]  [address] [place] [address]  Phone: [phone]

## 2024-01-17 NOTE — PROGRESS NOTES
[facility]  Patient was currently receiving services with [facility]. The patient was receiving RN PT OT and HHA services.  Patient's  and home health team have been notified that patient is under inpatient status Per patient request [facility] will close her to our home care services.

## 2024-01-18 ENCOUNTER — TELEPHONE (OUTPATIENT)
Dept: ORTHOPEDICS | Facility: CLINIC | Age: 69
End: 2024-01-18
Payer: MEDICARE

## 2024-01-18 PROCEDURE — 250N000011 HC RX IP 250 OP 636: Performed by: INTERNAL MEDICINE

## 2024-01-18 PROCEDURE — 99231 SBSQ HOSP IP/OBS SF/LOW 25: CPT | Performed by: INTERNAL MEDICINE

## 2024-01-18 PROCEDURE — 250N000013 HC RX MED GY IP 250 OP 250 PS 637: Performed by: INTERNAL MEDICINE

## 2024-01-18 PROCEDURE — 250N000013 HC RX MED GY IP 250 OP 250 PS 637

## 2024-01-18 PROCEDURE — G0463 HOSPITAL OUTPT CLINIC VISIT: HCPCS

## 2024-01-18 PROCEDURE — 120N000002 HC R&B MED SURG/OB UMMC

## 2024-01-18 PROCEDURE — 250N000013 HC RX MED GY IP 250 OP 250 PS 637: Performed by: STUDENT IN AN ORGANIZED HEALTH CARE EDUCATION/TRAINING PROGRAM

## 2024-01-18 RX ORDER — BISACODYL 10 MG
10 SUPPOSITORY, RECTAL RECTAL DAILY PRN
Status: DISCONTINUED | OUTPATIENT
Start: 2024-01-18 | End: 2024-01-20 | Stop reason: HOSPADM

## 2024-01-18 RX ORDER — FLUCONAZOLE 200 MG/1
200 TABLET ORAL DAILY
Qty: 13 TABLET | Refills: 0 | Status: SHIPPED | OUTPATIENT
Start: 2024-01-19 | End: 2024-01-20

## 2024-01-18 RX ORDER — OXYCODONE HYDROCHLORIDE 5 MG/1
2.5 TABLET ORAL EVERY 6 HOURS PRN
Qty: 15 TABLET | Refills: 0 | Status: SHIPPED | OUTPATIENT
Start: 2024-01-18 | End: 2024-02-14

## 2024-01-18 RX ORDER — GABAPENTIN 300 MG/1
600 CAPSULE ORAL 4 TIMES DAILY
Status: DISCONTINUED | OUTPATIENT
Start: 2024-01-18 | End: 2024-01-20 | Stop reason: HOSPADM

## 2024-01-18 RX ORDER — GABAPENTIN 300 MG/1
600 CAPSULE ORAL 4 TIMES DAILY
Status: ON HOLD
Start: 2024-01-18 | End: 2024-04-09

## 2024-01-18 RX ORDER — AMOXICILLIN 250 MG
2 CAPSULE ORAL 2 TIMES DAILY
Status: DISCONTINUED | OUTPATIENT
Start: 2024-01-18 | End: 2024-01-20 | Stop reason: HOSPADM

## 2024-01-18 RX ADMIN — FAMOTIDINE 40 MG: 20 TABLET ORAL at 21:26

## 2024-01-18 RX ADMIN — ACETAMINOPHEN 975 MG: 325 TABLET, FILM COATED ORAL at 08:50

## 2024-01-18 RX ADMIN — ACYCLOVIR 400 MG: 400 TABLET ORAL at 08:50

## 2024-01-18 RX ADMIN — BACLOFEN 10 MG: 10 TABLET ORAL at 17:30

## 2024-01-18 RX ADMIN — DULOXETINE HYDROCHLORIDE 80 MG: 60 CAPSULE, DELAYED RELEASE ORAL at 08:49

## 2024-01-18 RX ADMIN — ACETAMINOPHEN 975 MG: 325 TABLET, FILM COATED ORAL at 01:04

## 2024-01-18 RX ADMIN — LATANOPROST 1 DROP: 50 SOLUTION OPHTHALMIC at 13:28

## 2024-01-18 RX ADMIN — BACLOFEN 20 MG: 20 TABLET ORAL at 08:50

## 2024-01-18 RX ADMIN — ENOXAPARIN SODIUM 40 MG: 40 INJECTION SUBCUTANEOUS at 08:51

## 2024-01-18 RX ADMIN — BACLOFEN 30 MG: 20 TABLET ORAL at 21:25

## 2024-01-18 RX ADMIN — LIDOCAINE 3 PATCH: 4 PATCH TOPICAL at 21:24

## 2024-01-18 RX ADMIN — POLYETHYLENE GLYCOL 3350 17 G: 17 POWDER, FOR SOLUTION ORAL at 12:34

## 2024-01-18 RX ADMIN — LEVOTHYROXINE SODIUM 100 MCG: 100 TABLET ORAL at 08:55

## 2024-01-18 RX ADMIN — SENNOSIDES AND DOCUSATE SODIUM 2 TABLET: 8.6; 5 TABLET ORAL at 21:26

## 2024-01-18 RX ADMIN — GABAPENTIN 600 MG: 300 CAPSULE ORAL at 13:28

## 2024-01-18 RX ADMIN — ACETAMINOPHEN 975 MG: 325 TABLET, FILM COATED ORAL at 17:30

## 2024-01-18 RX ADMIN — FLUCONAZOLE 200 MG: 100 TABLET ORAL at 08:50

## 2024-01-18 RX ADMIN — BACLOFEN 10 MG: 10 TABLET ORAL at 01:11

## 2024-01-18 RX ADMIN — GABAPENTIN 600 MG: 300 CAPSULE ORAL at 17:30

## 2024-01-18 RX ADMIN — ACYCLOVIR 400 MG: 400 TABLET ORAL at 21:25

## 2024-01-18 RX ADMIN — GABAPENTIN 600 MG: 300 CAPSULE ORAL at 21:26

## 2024-01-18 RX ADMIN — GABAPENTIN 300 MG: 300 CAPSULE ORAL at 08:50

## 2024-01-18 ASSESSMENT — ACTIVITIES OF DAILY LIVING (ADL)
ADLS_ACUITY_SCORE: 57

## 2024-01-18 NOTE — PROGRESS NOTES
[facility]    Medicine Progress Note - Hospitalist Service, GOLD TEAM 18    Date of Admission:  1/5/2024    Assessment & Plan      Marylee Woods is a 68 year old female admitted on 1/5/2024. She has a history of Graves disease s/p ablation , HTN, CKD< MARYLU, depression, grade 2 DCIS of L breast, GERD, osteoporosis, and Multiple Sclerosis c/b neurogenic bladder.  Patient presented to the hospital on 1/5/2024 from clinic due to confusion and was found to have acute symptomatic hyponatremia.  Hyponatremia has since resolved and mental status is back to baseline.      # Hyponatremia - resolved   -Initially due to hypovolemic hyponatremia in the setting of use of hydrochlorothiazide which has since been discontinued this admission.  She also had elements of SIADH and has been on fluid restriction with sodium remained in normal.  Discontinued adequate thiazide  Continue fluid restriction at 1.5 L.    # Recent right distal femur fracture  # Recent left medial malleolus fracture  -Per orthopedic surgery, patient to be NWB for 8 weeks initially until at least 1/11; however, following discussion with orthopedic surgery this admission, patient is to continue nonweightbearing status until she sees orthopedic surgery in clinic currently scheduled for 1/24/2024.        # Concern for cystitis  # Pyuria  # Possible vulvovaginal candidiasis  -Prior to this admission, patient was started on Keflex for management of UTI.  Initially in the ER, she received Vanco and Zosyn but completed her antibiotic course with ceftriaxone on 1/9/2024.  -Patient also reporting some dysuria as of 1/15/2024.  Urinalysis obtained appears infectious, she started on ceftriaxone again.  -Urine culture this admission now growing candida albicans x2; given complaints of dysuria and patient's overall clinical picture,   Start fluconazole 200 mg every day x14 days 9EKG on admission Qtc 444)  Stop CTX   Continue to  monitor for resolution of symptoms.      # Sacral wounds, present on admission  # Coccyx left IT  - Patient not able to mobilize well and has been resisting turns at home.    - Suspect a combination of poor mobility as well as suboptimal nutrition.  - would consult, recs below  - Change dressings every 3 days  - Cleanse the area with wound cleanser and pat dry.  - Apply No sting film barrier to periwound skin.  - Cover wound with Sacral Mepilex or mepilex (Left IT)  - Turn and reposition Q 2hrs side to side only.  - Avoid HOB greather than 30 except for meals. Lower HOB when done eating.   - Ensure pt has Redd-cushion while sitting up in the chair.    # Lactic acidosis - resolved.     # Acute kidney injury on chronic kidney disease - DENNISE resolved.     # Multiple sclerosis  # Neurogenic bladder  -Of note, she has received Lemtrada in 2017 & 2019.  Continue baclofen 20 mg every morning, 30 mg every afternoon.    # Hx of Graves s/p thyroid ablation  -TSH elevated to 52 on admission but has been missing doses  -repeat in 2-4 weeks  -Continue PTA levothyroxine    # HSV prophylaxis   -Continue pta acyclovir 400mg    #  Anemia of chronic disease  Admission Hgb of 10.2. and stable  - follow     # MARYLU   -Patient on BiPAP at night.  Order was placed and RT will adjust settings for BiPAP.          Diet: Regular Diet Adult  Snacks/Supplements Adult: Ensure Enlive; Between Meals  Fluid restriction 1500 ML FLUID    DVT Prophylaxis: Pneumatic Compression Devices  Verduzco Catheter: Not present  Lines: None     Cardiac Monitoring: None  Code Status: Full Code      Clinically Significant Risk Factors                          # Moderate Malnutrition: based on nutrition assessment      # Financial/Environmental Concerns: none         Disposition Plan patient is medically cleared for discharge.  She is currently appealing her discharge.      GALI TRUONG MD  Hospitalist Service, GOLD TEAM 18  [facility]  [facility]  Securely message with [employer] (more info)  Text page via [employer] Paging/Directory   See signed in provider for up to date coverage information  ______________________________________________________________________    Interval History   -No acute events overnight  -Appealing discharge   -Remains afebrile and HDS. Attempted to reach spouse several times w/o success.     Physical Exam   Vital Signs: Temp: 98.1  F (36.7  C) Temp src: Oral BP: 117/67 Pulse: 87   Resp: 18 SpO2: 97 % O2 Device: None (Room air)    Weight: 140 lbs 0 oz    General Appearance: Alert, room air, no acute distress.   Respiratory: clear to auscultation bilaterally with good air entry   Cardiovascular: RRR.  Soft systolic murmur, no rubs, or gallops   GI: soft, non-tender, non-distended.  No hepatosplenomegaly or mass.  Other: Right lower extremity in hard cast.  Patient able to move bilateral toes.     Medical Decision Making             Data         Imaging results reviewed over the past 24 hrs:   No results found for this or any previous visit (from the past 24 hour(s)).

## 2024-01-18 NOTE — CONSULTS
"SPIRITUAL HEALTH SERVICES Consult Note  [facility] ([facility] 5B     initiated visit with pt Marylee. Marylee shared that her  is a  at a [facility], \"but it's not quite the same when your  is your \". She was eating lunch and watching a movie, and requested that I visit tomorrow morning instead before she discharges in the afternoon. I will plan on following up then.     Faina Larsen MDiv  Chaplain Resident  Pager [phone]    * [facility] remains available 24/7 for emergent requests/referrals, either by having the switchboard page the on-call  or by entering an ASAP/STAT consult in Epic (this will also page the on-call ). Routine Epic consults receive an initial response within 24 hours.*    "

## 2024-01-18 NOTE — PROGRESS NOTES
Care Management Follow Up    Length of Stay (days): 12    Expected Discharge Date: 01/18/2024     Concerns to be Addressed: discharge planning     Patient plan of care discussed at interdisciplinary rounds: Yes    Anticipated Discharge Disposition: Transitional Care     Anticipated Discharge Services:    Anticipated Discharge DME:      Patient/family educated on Medicare website which has current facility and service quality ratings: yes  Education Provided on the Discharge Plan: Yes  Patient/Family in Agreement with the Plan: yes    Referrals Placed by CM/SW: External Care Coordination  Private pay costs discussed: Not applicable    Additional Information:  Writer had called spouse this morning to discuss discharge planning. Then patient had appealed her discharge. Writer spoke to spouse, Manuel. He would prefer that patient receives home care services from [facility] instead of [facility].    [facility]. Awaiting response.    13:30: [facility] unable to accept patient due to at capacity. Patient will remain with [facility] for homecare services.  Also, Manuel had asked for information on the company that supplied their hospital bed. Per chart review, the hospital bed is from [employer]. Writer provided a printed copy of the information from [employer] to the patient.    14:18: Writer spoke with spouse. He wants a referral sent to [facility] for home care nursing services. Writer spoke with Yaz at [facility], and [facility] is not a home care agency. Writer left detailed message for Manuel. Patient will remain with [facility] for homecare services.    16:34: Manuel called and explained that he wants a referral sent to [facility] (TCU). Unable to send referral because through a quick Google search it appears that this facility is closed.     [facility]  [address] [place] [address]  Phone: [phone]  Fax:[phone]    Elly Pulliam, MSN, APRN,  AGCNS[address] [phone]  Nurse Coordinator    SEARCHABLE in [facility] - search CARE COORDINATOR     For Weekend & Holiday on call RN Care Coordinator:  (Tasks: Home care, home infusion, medical equipment, transportation, IMM & GONZALEZ forms, etc.)  [place] (0800 - 1630) Saturday and Sunday    Units: 5A, 5B, & 5C: [phone]    Units: 6B, 6C, 6D: [phone]    Units: 7A, 7B, 7C, 7D: [phone]    Units: 6A/ICU : [phone]     [facility] ([phone]) Saturday and Sunday    Units: 6 Med/Surg, 8A, 10 ICU, & Pediatric Units: [phone]    Units: 5 Ortho, 5Med/Surg & WB ED: [phone]

## 2024-01-18 NOTE — TELEPHONE ENCOUNTER
Attempted to contact patient, no answer. Message left with clinic number.   Spoke with patient spouse.  Per Arnaldo HOROWITZ, Xrays show progressive healing but should be remain NWB to RLE until follow up. Leave cast clean dry and intact.  Spouse states they are trying to get TCU placement and makes more difficult if NWB.  He verbalized understanding though and they will comply.     He states that she was in the hospital and went home, then ended up back in the hospital, so she doesn't want to go again too soon.  She has MS and has trouble ambulating anyway, and is laying in bed getting more deconditioned, so they were hoping for TCU for PT/OT therapies.      They will follow up on 1-24-24 for recheck.     Also informed per Arnaldo NUNO,  informed that  in general we direct patients that are inpatient to discuss treatment plan with the inpatient team and providers and not to reach out to the clinic until they are discharged.   Spouse states that this was all a part of the discharge process and that is why he reached out.   Destiney Leyva RN

## 2024-01-18 NOTE — PROGRESS NOTES
[facility]  Orthopaedic Surgery Consultation by Jero Em M.D.    Marylee Woods MRN# [id number]   Age: 68 year old YOB: 1955     Requesting physician: No ref. provider found  Carolina Pulliam     Background history:  DX:  Multiple sclerosis wheelchair-bound  Graves' disease  CKD  Hypertension  OSAS  Depressive disorder  Osteoporosis  Tobacco use disorder    TREATMENTS:  2009, right intertrochanteric femur fracture for which DHS, Dr. Xie  2013, ORIF tibia fracture with a medullary nail, Dr. Cast  2014, laparoscopic cholecystectomy  2018, ORIF left distal femur fracture, Dr. Valdaez           History of Present Illness:   68 year old woman with history of  multiple sclerosis, Graves disease with hx of thyroid ablation,CKD, hypertension, sleep apnea, depression, 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     Patient was admitted after falls with a displaced, comminuted right distal femur fracture and left minimally displaced medial malleolus fracture. Patient has a significant amount of hardware from prior orthopedic injuries.  Osteopenic appearing bone.  Wheelchair-bound.  Given patient's lower demand lifestyle and prior hardware in the right femur, concern of surgical intervention is the creation of a stress riser or increasing risk for a new fracture along with the risks associated with surgery/ORIF.  Alternatively we discussed the options of nonsurgical treatment.  After discussing these options with the patient it was decided to proceed with nonsurgical treatment.  The right lower extremity of patient was placed in a long leg cast . With regards to her medial malleolus fracture, again given lower demand will plan to treat in a CAM boot.     Today patient is approximately 8 weeks after the above-mentioned traumatic event.  States that she  is doing relatively well.  She is not experiencing any significant pain of the right lower extremity.  The cast has been removed.  The cam boot has been removed.  She is wondering when she can have her AFOs fitted.    At baseline patient is able to transfer from bed to chair and walk a few feet with the assistance of a walker.    Social:   Occupation:   Living situation: With   Hobbies / Sports:    Smoking:Yes  Alcohol: No  Illicit drug use: No         Physical Exam:     EXAMINATION pertinent findings:   PSYCH: Pleasant, healthy-appearing, alert, oriented x3, cooperative. Normal mood and affect.  VITAL SIGNS: not currently breastfeeding.  Reviewed nursing intake notes.   There is no height or weight on file to calculate BMI.  RESP: non labored breathing   ABD: benign, soft, non-tender, no acute peritoneal findings  SKIN: grossly normal   LYMPHATIC: grossly normal, no adenopathy, no extremity edema  NEURO: grossly normal , no motor deficits  VASCULAR: satisfactory perfusion of all extremities   MUSCULOSKELETAL:   Alignment: Normal alignment right lower extremity.  Patient is seated in wheelchair.  Peripheral edema right lower extremity.  No tenderness to palpation over fracture site.  Axial compression not painful.  Range of motion knee right flexion/extension 100/0/0.  Deep flexion is painful.           Data:   All laboratory data reviewed  All imaging studies reviewed by me personally.    XR knee/femur right 1/24/2024:  My interpretation: Shortening of distal femur but adequate alignment in both AP and lateral imaging studies.  Appearance of progressive callus formation around fracture site.  No signs of secondary displacement.  Severely osteopenic bone.    XR ankle left 1/24/2024:  My interpretation: Minimally displaced medial malleolar fracture.         Assessment and Plan:   Assessment:  68-year-old female presenting for follow-up after minimally displaced and shortened distal femur fracture for which  nonsurgical treatment was initiated by means of long-leg cast.  Also following up after minimally displaced left medial malleolar fracture for which cam boot was advised.  Clinically and radiographically healing fractures.     Plan:  I extensively discussed the fracture pattern and treatment options with the patient and her spouse.  At this point it appears that the fractures of the femur and distal tibia have healed well.  No further immobilization is needed.  For the first 4 weeks from now I would recommend wearing the hinged knee brace that she has for transfers.  She can start doing her pivoting transfers weightbearing as tolerated.  I would recommend her waiting for another month or so until the residual peripheral edema has subsided before fitting her AFOs.  All questions were answered.  Patient understands and agrees to the treatment plan as set forth.  We will follow-up with her on an as-needed basis.      Jero Em MD, PhD     Adult Reconstruction  [facility] Department of Orthopaedic Surgery    This note was created using dictation software and may contain errors.  Please contact the creator for any clarifications that are needed.

## 2024-01-18 NOTE — DISCHARGE SUMMARY
[facility]  Hospitalist Discharge Summary      Date of Admission:  1/5/2024  Date of Discharge:  1/18/2024  Discharging Provider: GALI TRUONG MD  Discharge Service: Hospitalist Service, GOLD TEAM 18    Discharge Diagnoses   See below    Clinically Significant Risk Factors     # Moderate Malnutrition: based on nutrition assessment      Follow-ups Needed After Discharge   Follow-up Appointments     Adult [facility] Follow-up and recommended labs and tests      Follow up with primary care provider, Carolina Pulliam, within 7 days   for hospital follow- up.  No follow up labs or test are needed.    Patient also needs to follow-up with orthopedic surgery as scheduled.    Appointments on [place] and/or [facility] (with [facility] or [facility]   provider or service). Call [phone] if you haven't heard regarding   these appointments within 7 days of discharge.            Discharge Disposition   Discharged to home  Condition at discharge: Good    Hospital Course   Marylee Woods is a 68 year old female admitted on 1/5/2024. She has a history of Graves disease s/p ablation , HTN, CKD< MARYLU, depression, grade 2 DCIS of L breast, GERD, osteoporosis, and Multiple Sclerosis c/b neurogenic bladder.  Patient presented to the hospital on 1/5/2024 from clinic due to confusion and was found to have acute symptomatic hyponatremia.  Hyponatremia has since resolved and mental status is back to baseline.        # Hyponatremia - resolved   -Initially due to hypovolemic hyponatremia in the setting of use of hydrochlorothiazide which has since been discontinued this admission.  She also had elements of SIADH and has been on fluid restriction with sodium remained in normal.  Discontinued adequate thiazide  Continue fluid restriction at 2.0 L.     # Recent right distal femur fracture  # Recent left medial malleolus fracture  -Per orthopedic surgery, patient to be NWB for 8 weeks initially until  at least 1/11; however, following discussion with orthopedic surgery this admission, patient is to continue nonweightbearing status until she sees orthopedic surgery in clinic currently scheduled for 1/24/2024.          # Concern for cystitis  # Pyuria  # Possible vulvovaginal candidiasis  -Prior to this admission, patient was started on Keflex for management of UTI.  Initially in the ER, she received Vanco and Zosyn but completed her antibiotic course with ceftriaxone on 1/9/2024.  -Patient also reporting some dysuria as of 1/15/2024.  Urinalysis obtained appears infectious, she started on ceftriaxone again.  -Urine culture this admission now growing candida albicans x2; given complaints of dysuria and patient's overall clinical picture,   S/p fluconazole 200 mg every day x3 doses.          # Sacral wounds, present on admission  # Stage 2 coccyx pressure ulcer - present on admission  # Coccyx left IT  - Patient not able to mobilize well and has been resisting turns at home.    - Suspect a combination of poor mobility as well as suboptimal nutrition.  - would consult, recs below; seen again most recently 1/18/2024  - Change dressings every 3 days  - Cleanse the area with wound cleanser and pat dry.  - Apply No sting film barrier to periwound skin.  - Cover wound with Sacral Mepilex or mepilex (Left IT)  - Turn and reposition Q 2hrs side to side only.  - Avoid HOB greather than 30 except for meals. Lower HOB when done eating.   - Ensure pt has Redd-cushion while sitting up in the chair.     # Lactic acidosis - resolved.      # Acute kidney injury on chronic kidney disease - DENNISE resolved.      # Multiple sclerosis  # Neurogenic bladder  -Of note, she has received Lemtrada in 2017 & 2019.  Continue baclofen 20 mg every morning, 30 mg every afternoon.     # Hx of Graves s/p thyroid ablation  -TSH elevated to 52 on admission but has been missing doses  -repeat in 2-4 weeks  -Continue PTA levothyroxine     # HSV  prophylaxis   -Continue pta acyclovir 400mg     #  Anemia of chronic disease  Admission Hgb of 10.2. and stable  - follow      # MARYLU   -Patient on BiPAP at night.  Order was placed and RT will adjust settings for BiPAP.    Consultations This Hospital Stay   PHYSICAL THERAPY ADULT IP CONSULT  OCCUPATIONAL THERAPY ADULT IP CONSULT  WOUND OSTOMY CONTINENCE NURSE  IP CONSULT  WOUND OSTOMY CONTINENCE NURSE  IP CONSULT  PHARMACY TO DOSE VANCO  NEPHROLOGY GENERAL ADULT IP CONSULT  ORTHOPAEDIC SURGERY ADULT/PEDS IP CONSULT  WOUND OSTOMY CONTINENCE NURSE  IP CONSULT  CARE MANAGEMENT / SOCIAL WORK IP CONSULT  OCCUPATIONAL THERAPY ADULT IP CONSULT  ORTHOPAEDIC SURGERY ADULT/PEDS IP CONSULT  SPIRITUAL HEALTH SERVICES IP CONSULT  SPIRITUAL HEALTH SERVICES IP CONSULT    Code Status   Full Code    Time Spent on this Encounter   I, GALI TRUONG MD, personally saw the patient today and spent greater than 30 minutes discharging this patient.       GALI TRUONG MD  [facility] MED SURG  [address] [place] [id number]  Phone: [phone]  Fax: [phone]  ______________________________________________________________________    Physical Exam   Vital Signs: Temp: 98.1  F (36.7  C) Temp src: Oral BP: 116/53 Pulse: 76   Resp: 17 SpO2: 97 % O2 Device: None (Room air)    Weight: 129 lbs 6.56 oz    General Appearance:  Alert, room air, no acute distress.   Respiratory: clear to auscultation bilaterally with good air entry   Cardiovascular: RRR.  Soft systolic murmur, no rubs, or gallops   GI: soft, non-tender, non-distended.  No hepatosplenomegaly or mass.  Other:  Right lower extremity in hard cast.  Patient able to move bilateral toes.        Primary Care Physician   Carolina Pulliam    Discharge Orders      Home Care Referral      Wound care    Coccyx and Left IT wound(s): Every 3 days  Cleanse the area with wound cleanser and pat dry.  Apply No sting film barrier to periwound skin.  Cover wound with  Sacral Mepilex or mepilex (Left IT)  Change dressing Q 3 days.  Turn and reposition Q 2hrs side to side only.  Avoid HOB greather than 30 except for meals. Lower HOB when done eating.   Ensure pt has Redd-cushion while sitting up in the chair.  FYI- If pt has constant incontinent loose stools needing dressing changes Q shift please discontinue the Mepilex dressing and apply criticaid barrier paste BID and PRN.     Heels: Daily  Prevalon boot to left foot for prevention.   Elevate leg on 2 pillows to float heels.     Activity    Your activity upon discharge: activity as tolerated     Adult [facility] Follow-up and recommended labs and tests    Follow up with primary care provider, Carolina Pulliam, within 7 days for hospital follow- up.  No follow up labs or test are needed.    Patient also needs to follow-up with orthopedic surgery as scheduled.    Appointments on [place] and/or [facility] (with [facility] or [facility] provider or service). Call [phone] if you haven't heard regarding these appointments within 7 days of discharge.     NO weight bearing    No weight bearing right right lower extremity     Dressing / Wound Care - Wound    Leave the cast on the right lower extremity clean, dry and intact until follow up with Dr Em on 1/24/24.     Reason for your hospital stay    Presented to the hospital due to confusion and found to have severe hyponatremia which has since resolved.  She also completed her treatment for Klebsiella UTI.  She continues to complain of dysuria, was growing Candida in the urine but likely due to vulvovaginal candidiasis for which she received fluconazole x3 doses with adequate effect.  Case reviewed by orthopedic surgery, patient to remain nonweightbearing in lower extremity until she sees orthopedic surgery of 1/24/2024.     Diet    Follow this diet upon discharge: Orders Placed This Encounter      Snacks/Supplements Adult: Ensure Enlive; Between Meals      Fluid restriction 1500  ML FLUID      Regular Diet Adult       Significant Results and Procedures   Most Recent 3 CBC's:  Recent Labs   Lab Test 01/20/24  0756 01/17/24  0726 01/15/24  1457 01/14/24  0631 01/13/24  0758 01/11/24  0907 01/06/24  1057   WBC  --   --  4.4  --  3.4*  --  6.0   HGB  --   --  9.3*  --  8.5*  --  9.0*   MCV  --   --  101*  --  97  --  94    174 235   < > 201   < > 199    < > = values in this interval not displayed.     Most Recent 3 BMP's:  Recent Labs   Lab Test 01/16/24  0917 01/15/24  0947 01/14/24  0631 01/13/24  0758     --  139 140   POTASSIUM 4.1  --  4.2 4.1   CHLORIDE 105  --  107 106   CO2 29  --  29 28   BUN 20.5  --  16.4 13.4   CR 0.73  --  0.67 0.64   ANIONGAP 5*  --  3* 6*   JORDON 9.1  --  8.9 8.8   GLC 94 100* 90 92     Most Recent 2 LFT's:  Recent Labs   Lab Test 01/05/24  2340 11/30/23  0654   AST 36 16   ALT 17 10   ALKPHOS 108 118   BILITOTAL 0.2 0.3     Most Recent 3 INR's:  Recent Labs   Lab Test 11/19/23  0608 11/17/23  1215 10/31/18  1859   INR 1.17* 1.26* 1.03   ,   Results for orders placed or performed during the hospital encounter of 01/05/24   XR Knee Right 1/2 Views    Narrative    EXAM: XR KNEE RIGHT 1/2 VIEWS  LOCATION: [facility]  DATE: 1/10/2024    INDICATION: s p 8 week casting for distal femoral metadiaphyseal fracture  COMPARISON: 12/13/2023      Impression    IMPRESSION: Overlying cast material which limits fine bony detail. Distal femoral metaphysis fracture with similar impaction and displacement. Interval healing with increased mature callus formation. There is likely partial osseous bridging. Normal joint   alignment. No significant knee joint effusion. Osteopenia.      *Note: Due to a large number of results and/or encounters for the requested time period, some results have not been displayed. A complete set of results can be found in Results Review.       Discharge Medications   Current Discharge Medication List         CONTINUE these medications which have CHANGED    Details   gabapentin (NEURONTIN) 300 MG capsule Take 2 capsules (600 mg) by mouth 4 times daily    Associated Diagnoses: MS (multiple sclerosis) (H)      oxyCODONE (ROXICODONE) 5 MG tablet Take 0.5 tablets (2.5 mg) by mouth every 6 hours as needed for moderate pain  Qty: 15 tablet, Refills: 0    Associated Diagnoses: Other closed fracture of distal end of right femur, initial encounter (H)           CONTINUE these medications which have NOT CHANGED    Details   acetaminophen (TYLENOL) 500 MG tablet Take 1-2 tablets (500-1,000 mg) by mouth every 8 hours as needed for mild pain or fever (greater than 101 degrees)      acyclovir (ZOVIRAX) 400 MG tablet Take 1 tablet (400 mg) by mouth every 12 hours  Qty: 180 tablet, Refills: 3    Associated Diagnoses: HSV (herpes simplex virus) infection      Armodafinil 200 MG TABS Take 200 mg by mouth every morning      baclofen (LIORESAL) 10 MG tablet Take 10-30 mg by mouth 2 times daily Take 2 tablets (20 MG) by mouth every morning, 1 tablet (10 MG) by mouth daily in the afternoon as needed, and 3 tablets (30 MG) by mouth every evening before bedtime.      calcium carbonate 500 mg, elemental, (OSCAL 500) 1250 (500 Ca) MG TABS tablet Take 2 tablets by mouth at bedtime      DULoxetine (CYMBALTA) 20 MG capsule Take 1 capsule (20 mg) by mouth daily Take along with 60 mg for a total of 80 mg  Qty: 90 capsule, Refills: 3    Associated Diagnoses: Moderate episode of recurrent major depressive disorder (H)      famotidine (PEPCID) 40 MG tablet Take 1 tablet (40 mg) by mouth At Bedtime  Qty: 90 tablet, Refills: 3    Associated Diagnoses: Gastroesophageal reflux disease without esophagitis      latanoprost (XALATAN) 0.005 % ophthalmic solution INSTILL 1 DROP INTO BOTH EYES EVERY DAY AS DIRECTED PT WILL NEED TO BE SEEN FOR FUTURE REFILLS      levothyroxine (SYNTHROID/LEVOTHROID) 100 MCG tablet TAKE 1 TABLET BY MOUTH SIX TIMES A  WEEK  Qty: 30 tablet, Refills: 0    Associated Diagnoses: Postablative hypothyroidism      multivitamin w/minerals (THERA-VIT-M) tablet Take 1 tablet by mouth every evening      polyethylene glycol (MIRALAX) 17 GM/Dose powder Take 17 g by mouth daily  Qty: 510 g, Refills: 1    Associated Diagnoses: Constipation, unspecified constipation type           STOP taking these medications       cephALEXin (KEFLEX) 500 MG capsule Comments:   Reason for Stopping:             Allergies   Allergies   Allergen Reactions    Bactrim [Sulfamethoxazole-Trimethoprim] Hallucination    Sulfamethizole     Amantadine      hallucinations    Budesonide     Budesonide-Formoterol Fumarate Rash

## 2024-01-18 NOTE — PLAN OF CARE
/66 (BP Location: Left arm)   Pulse 83   Temp 98.8  F (37.1  C) (Oral)   Resp 18   Wt 63.5 kg (140 lb)   LMP  (LMP Unknown)   SpO2 98%   BMI 21.29 kg/m   ON RA     Marylee Woods is a 68 year old female who is here for Hyponatremia.    NURSING ASSESSMENT:  Neuro: A&Ox4 denies CP or SOB   Respiratory: WDL. Bipap over night.   Cardiac: WDL  GI/: Using Briefs  Activity/Musculoskeletal: Q2 repositioning.   Skin: Pressure wounds to Sacrum/Coccyx & L heel. Cast to R leg   Psychosocial: WDL    Changes this shift: No acute changes    PAIN MANAGEMENT:   Tylenol, repositioning     NURSING PLAN:  Plan to discontinue home tomorrow 1/18/24. Follow POC.    DISCHARGE DISPOSITION:  Estimated discharge date: 1/18/24. Pt to go transitional care    Cortney Kurtz RN

## 2024-01-18 NOTE — TELEPHONE ENCOUNTER
[facility] Call Center    Phone Message    May a detailed message be left on voicemail: yes     Reason for Call: Other: Pt has appt on 01/24 for cast removal, pt spouse is requesting for care team to call him back in regards to img results. Pt is in the hospital currently and would llike to know for status of weight bearing prior to going to TCU. Hospital care team would like to discharge pt today, but spouse is appealing. Can care team call pt back to discuss?     Action Taken: Other: [facility] [[id number]]    Travel Screening: Not Applicable

## 2024-01-18 NOTE — PLAN OF CARE
End of shift Summary: See flowsheet for VS and detail assessments.     Changes this Shift: 2656-5723: No significant changes. A&Ox4, VSS, sats maintained on RA.  Repositioning in bed L to R, pillows to support.  Scheduled tylenol and gabapentin given, PRN baclofen per pt request.   Call light remains within reach, able to make needs known.     Plan: Cont POC. Possibly home tomorrow pending discharge appeal.

## 2024-01-18 NOTE — PROGRESS NOTES
[facility] Nurse Inpatient Assessment     Consulted for: Buttocks, left hip (IT), and left heel    Summary: Deep tissue pressure injury to coccyx and left IT. Peeling skin to left heel.     Patient History (according to provider note(s):      Marylee Woods is a 68 year old female admitted on 1/5/2024. She has a history of Graves disease s/p ablation , HTN, CKD< MARYLU, depression, grade 2 DCIS of L breast, GERD, osteoporosis, and MS c/b neurogenic bladder and is admitted for severe hyponatremia, also found to have new pressure ulcers.     Assessment:      Areas visualized during today's visit: Perineal area, Sacrum/coccyx, and Lower extremities     Heels: Right heel unable to assess as it is casted. Right heel is intact, no signs of pressure injuy.    Pressure Injury Location: coccyx     1/7/24 1/18    Last photo: 1/18/24  Wound type: Pressure Injury     Pressure Injury Stage: 2, present on admission   Wound history/plan of care:  Evolving pressure injury   Wound base: 90% dermis, 10% fibrin     Palpation of the wound bed: normal      Drainage: small     Description of drainage: serosanguinous     Measurements (length x width x depth, in cm) Total area 2.5  x 4  x  0.2 cm   Periwound skin: Intact      Color: normal and consistent with surrounding tissue      Temperature: normal   Odor: none  Pain: mild, tender  Pain intervention prior to dressing change: slow and gentle cares   Treatment goal: Heal   STATUS: evolving  Supplies ordered: supplies stored on unit, discussed with RN, and discussed with patient     Pressure Injury Location: Left IT     1/7/24 1/12 1/18    Last photo: 1/18/24  Wound type: Pressure Injury     Pressure Injury Stage: 2, present on admission   Wound history/plan of care:  Open dermis within slowly blanchable erythema.   Wound base: 100 % maroon, dermis     Palpation of the wound  bed: normal      Drainage: none     Description of drainage: none     Measurements (length x width x depth, in cm)   Dermis: 3.5 x 2.5 x 0 cm  Maroon: 1 x 2 x 0 cm   Periwound skin: Intact      Color: normal and consistent with surrounding tissue      Temperature: normal   Odor: none  Pain: denies , none  Pain intervention prior to dressing change: no significant pain present   Treatment goal: Heal  and Protection  STATUS: evolving  Supplies ordered: supplies stored on unit and discussed with RN         Treatment Plan:     Coccyx and Left IT wound(s): Every 3 days  Cleanse the area with wound cleanser and pat dry.  Apply No sting film barrier to periwound skin.  Cover wound with Sacral Mepilex or mepilex (Left IT)  Change dressing Q 3 days.  Turn and reposition Q 2hrs side to side only.  Avoid HOB greather than 30 except for meals. Lower HOB when done eating.   Ensure pt has Redd-cushion while sitting up in the chair.  FYI- If pt has constant incontinent loose stools needing dressing changes Q shift please discontinue the Mepilex dressing and apply criticaid barrier paste BID and PRN.    Heels: Daily  Prevalon boot to left foot for prevention.   Elevate leg on 2 pillows to float heels.     Orders: Reviewed    RECOMMEND PRIMARY TEAM ORDER: None, at this time  Education provided: importance of repositioning, plan of care, and wound progress  Discussed plan of care with: Patient and Nurse  WOC nurse follow-up plan: weekly  Notify WOC if wound(s) deteriorate.  Nursing to notify the Provider(s) and re-consult the WOC Nurse if new skin concern.    DATA:     Current support surface: Standard  Low air loss (MISTY pump, Isolibrium, Pulsate, skin guard, etc)  Containment of urine/stool: Purewick external catheter   BMI: Body mass index is 21.29 kg/m .   Active diet order: Orders Placed This Encounter      Regular Diet Adult      Diet     Output: I/O last 3 completed shifts:  In: 450 [P.O.:450]  Out: -      Labs:   Recent Labs    Lab 01/15/24  1457   HGB 9.3*   WBC 4.4     Pressure injury risk assessment:   Sensory Perception: 3-->slightly limited  Moisture: 3-->occasionally moist  Activity: 2-->chairfast  Mobility: 2-->very limited  Nutrition: 3-->adequate  Friction and Shear: 1-->problem  Broderick Score: 14    Marguerite Reyes RN CWOCN  Pager no longer is use, please contact through [email or web address] group: [facility] Nurse [facility]  Dept. Office Number: [phone]

## 2024-01-18 NOTE — PROGRESS NOTES
Care Management Follow Up    Length of Stay (days): 12    Expected Discharge Date: 01/18/2024     Concerns to be Addressed: discharge appeal     Patient plan of care discussed at interdisciplinary rounds: Yes    Anticipated Discharge Disposition: Home     Anticipated Discharge Services: n/a    Anticipated Discharge DME: n/a    Patient/family educated on Medicare website which has current facility and service quality ratings: yes    Education Provided on the Discharge Plan: Yes    Patient/Family in Agreement with the Plan: yes    Referrals Placed by CM/SW: Post Acute Facilities - however recommendations from OT and PT changed from TCU to home    Private pay costs discussed: Not applicable    Additional Information: JUSTIN learned that pt/family are appealing discharge.    JUSTIN sent IMM form, detailed notice of discharge, and [facility] fax to Release of Information department.    JUSTIN called [employer] department @ [phone] and spoke with a representative who reported this will be expedited to the supervisor.    Plan: [facility] to fax hospital records to [facility] for review. They will get back to us on if appeal is upheld or denied.    Paola Collins JUSTIN  [facility]   Covering for Taylor Gardner - Phone: [phone]

## 2024-01-18 NOTE — PLAN OF CARE
Wound care completed 1/17 - next due 1/20. Q2 repositioning - pt refused twice overnight.     Incontinent - brief on. LBM 1/17.     CPAP on overnight.     Plan: discharge home w/ home PT/OT per notes.

## 2024-01-18 NOTE — TELEPHONE ENCOUNTER
Pt is still admitted.  New orders will be needed upon discharge.  Closing encounter.    HEIYD Anglin [facility]

## 2024-01-19 ENCOUNTER — APPOINTMENT (OUTPATIENT)
Dept: OCCUPATIONAL THERAPY | Facility: CLINIC | Age: 69
DRG: 644 | End: 2024-01-19
Payer: MEDICARE

## 2024-01-19 PROCEDURE — 99231 SBSQ HOSP IP/OBS SF/LOW 25: CPT | Performed by: INTERNAL MEDICINE

## 2024-01-19 PROCEDURE — 120N000002 HC R&B MED SURG/OB UMMC

## 2024-01-19 PROCEDURE — 250N000013 HC RX MED GY IP 250 OP 250 PS 637: Performed by: INTERNAL MEDICINE

## 2024-01-19 PROCEDURE — 97110 THERAPEUTIC EXERCISES: CPT | Mod: GO

## 2024-01-19 PROCEDURE — 250N000013 HC RX MED GY IP 250 OP 250 PS 637: Performed by: STUDENT IN AN ORGANIZED HEALTH CARE EDUCATION/TRAINING PROGRAM

## 2024-01-19 PROCEDURE — 250N000011 HC RX IP 250 OP 636: Performed by: INTERNAL MEDICINE

## 2024-01-19 PROCEDURE — 250N000013 HC RX MED GY IP 250 OP 250 PS 637

## 2024-01-19 RX ADMIN — LEVOTHYROXINE SODIUM 100 MCG: 100 TABLET ORAL at 09:17

## 2024-01-19 RX ADMIN — ENOXAPARIN SODIUM 40 MG: 40 INJECTION SUBCUTANEOUS at 09:18

## 2024-01-19 RX ADMIN — SENNOSIDES AND DOCUSATE SODIUM 2 TABLET: 8.6; 5 TABLET ORAL at 20:17

## 2024-01-19 RX ADMIN — ACYCLOVIR 400 MG: 400 TABLET ORAL at 20:16

## 2024-01-19 RX ADMIN — Medication 600 MG: at 13:07

## 2024-01-19 RX ADMIN — ACETAMINOPHEN 975 MG: 325 TABLET, FILM COATED ORAL at 17:05

## 2024-01-19 RX ADMIN — GABAPENTIN 600 MG: 300 CAPSULE ORAL at 13:05

## 2024-01-19 RX ADMIN — DULOXETINE HYDROCHLORIDE 80 MG: 60 CAPSULE, DELAYED RELEASE ORAL at 09:16

## 2024-01-19 RX ADMIN — POLYETHYLENE GLYCOL 3350 17 G: 17 POWDER, FOR SOLUTION ORAL at 09:23

## 2024-01-19 RX ADMIN — GABAPENTIN 600 MG: 300 CAPSULE ORAL at 09:16

## 2024-01-19 RX ADMIN — LATANOPROST 1 DROP: 50 SOLUTION OPHTHALMIC at 09:18

## 2024-01-19 RX ADMIN — ACYCLOVIR 400 MG: 400 TABLET ORAL at 09:16

## 2024-01-19 RX ADMIN — BACLOFEN 30 MG: 20 TABLET ORAL at 20:16

## 2024-01-19 RX ADMIN — BACLOFEN 20 MG: 20 TABLET ORAL at 09:16

## 2024-01-19 RX ADMIN — LIDOCAINE 3 PATCH: 4 PATCH TOPICAL at 20:17

## 2024-01-19 RX ADMIN — GABAPENTIN 600 MG: 300 CAPSULE ORAL at 17:05

## 2024-01-19 RX ADMIN — FAMOTIDINE 40 MG: 20 TABLET ORAL at 20:17

## 2024-01-19 RX ADMIN — ACETAMINOPHEN 975 MG: 325 TABLET, FILM COATED ORAL at 01:09

## 2024-01-19 RX ADMIN — GABAPENTIN 600 MG: 300 CAPSULE ORAL at 20:16

## 2024-01-19 RX ADMIN — ACETAMINOPHEN 975 MG: 325 TABLET, FILM COATED ORAL at 09:16

## 2024-01-19 RX ADMIN — FLUCONAZOLE 200 MG: 100 TABLET ORAL at 09:16

## 2024-01-19 ASSESSMENT — ACTIVITIES OF DAILY LIVING (ADL)
ADLS_ACUITY_SCORE: 57
ADLS_ACUITY_SCORE: 61
ADLS_ACUITY_SCORE: 57
ADLS_ACUITY_SCORE: 61
ADLS_ACUITY_SCORE: 57
ADLS_ACUITY_SCORE: 57

## 2024-01-19 NOTE — PROGRESS NOTES
[facility]    Medicine Progress Note - Hospitalist Service, GOLD TEAM 18    Date of Admission:  1/5/2024    Assessment & Plan      Marylee Woods is a 68 year old female admitted on 1/5/2024. She has a history of Graves disease s/p ablation , HTN, CKD< MARYLU, depression, grade 2 DCIS of L breast, GERD, osteoporosis, and Multiple Sclerosis c/b neurogenic bladder.  Patient presented to the hospital on 1/5/2024 from clinic due to confusion and was found to have acute symptomatic hyponatremia.  Hyponatremia has since resolved and mental status is back to baseline.      # Hyponatremia - resolved   -Initially due to hypovolemic hyponatremia in the setting of use of hydrochlorothiazide which has since been discontinued this admission.  She also had elements of SIADH and has been on fluid restriction with sodium remained in normal.  Discontinued adequate thiazide  Continue fluid restriction at 2.0 L.    # Recent right distal femur fracture  # Recent left medial malleolus fracture  -Per orthopedic surgery, patient to be NWB for 8 weeks initially until at least 1/11; however, following discussion with orthopedic surgery this admission, patient is to continue nonweightbearing status until she sees orthopedic surgery in clinic currently scheduled for 1/24/2024.        # Concern for cystitis  # Pyuria  # Possible vulvovaginal candidiasis  -Prior to this admission, patient was started on Keflex for management of UTI.  Initially in the ER, she received Vanco and Zosyn but completed her antibiotic course with ceftriaxone on 1/9/2024.  -Patient also reporting some dysuria as of 1/15/2024.  Urinalysis obtained appears infectious, she started on ceftriaxone again.  -Urine culture this admission now growing candida albicans x2; given complaints of dysuria and patient's overall clinical picture,   S/p fluconazole 200 mg every day x3 doses.        # Sacral wounds, present on admission  # Coccyx  left IT  - Patient not able to mobilize well and has been resisting turns at home.    - Suspect a combination of poor mobility as well as suboptimal nutrition.  - would consult, recs below; seen again most recently 1/18/2024  - Change dressings every 3 days  - Cleanse the area with wound cleanser and pat dry.  - Apply No sting film barrier to periwound skin.  - Cover wound with Sacral Mepilex or mepilex (Left IT)  - Turn and reposition Q 2hrs side to side only.  - Avoid HOB greather than 30 except for meals. Lower HOB when done eating.   - Ensure pt has Redd-cushion while sitting up in the chair.    # Lactic acidosis - resolved.     # Acute kidney injury on chronic kidney disease - DENNISE resolved.     # Multiple sclerosis  # Neurogenic bladder  -Of note, she has received Lemtrada in 2017 & 2019.  Continue baclofen 20 mg every morning, 30 mg every afternoon.    # Hx of Graves s/p thyroid ablation  -TSH elevated to 52 on admission but has been missing doses  -repeat in 2-4 weeks  -Continue PTA levothyroxine    # HSV prophylaxis   -Continue pta acyclovir 400mg    #  Anemia of chronic disease  Admission Hgb of 10.2. and stable  - follow     # MARYLU   -Patient on BiPAP at night.  Order was placed and RT will adjust settings for BiPAP.          Diet: Regular Diet Adult  Diet  Fluid restriction 2000 ML FLUID  Snacks/Supplements Adult: Ensure Enlive; Between Meals    DVT Prophylaxis: Pneumatic Compression Devices  Verduzco Catheter: Not present  Lines: None     Cardiac Monitoring: None  Code Status: Full Code      Clinically Significant Risk Factors                          # Moderate Malnutrition: based on nutrition assessment      # Financial/Environmental Concerns: none         Disposition Plan patient is medically cleared for discharge.  She is currently appealing her discharge.      GALI TRUONG MD  Hospitalist Service, GOLD TEAM 18  [facility]  Securely message with IMANIN  (more info)  Text page via [facility] Paging/Directory   See signed in provider for up to date coverage information  ______________________________________________________________________    Interval History   -No acute events overnight  -Appealing discharge   -Afebrile and hemodynamically stable      Physical Exam   Vital Signs: Temp: 98.4  F (36.9  C) Temp src: Oral BP: 131/81 Pulse: 94   Resp: 16 SpO2: 98 % O2 Device: None (Room air)    Weight: 129 lbs 6.56 oz    General Appearance: Alert, room air, no acute distress.   Respiratory: clear to auscultation bilaterally with good air entry   Cardiovascular: RRR.  Soft systolic murmur, no rubs, or gallops   GI: soft, non-tender, non-distended.  No hepatosplenomegaly or mass.  Other: Right lower extremity in hard cast.  Patient able to move bilateral toes.     Medical Decision Making             Data         Imaging results reviewed over the past 24 hrs:   No results found for this or any previous visit (from the past 24 hour(s)).

## 2024-01-19 NOTE — PROGRESS NOTES
CLINICAL NUTRITION SERVICES - REASSESSMENT NOTE     Nutrition Prescription    RECOMMENDATIONS FOR MDs/PROVIDERS TO ORDER:  If pt to remain admitted, suggest resuming home meds of Oscal and MVI     Spoke with rounding Provider face to face, and Provider agreeing to increasing fluid restriction to 2 L     Malnutrition Status:    Moderate malnutrition in the context of acute on chronic illness or injury      Recommendations already ordered by Registered Dietitian (RD):  Added weekly weight order and requested nursing obtain updated weight     Updated supplement order to PRN as pt has excess stock in room, pt aware can call for additional servings once room stock consumed     Future/Additional Recommendations:  Continue to monitor meal/supplement intakes, wt/lab trends      EVALUATION OF THE PROGRESS TOWARD GOALS   Diet: Regular, 1500 ml FR  Supplement: Chocolate Ensure once daily at 2 pm snack time + PRN (pt/staff can request additional servings at any time)   Intake: % per flow sheets        NEW FINDINGS   MAR reviewed. Labs reviewed, Na trends noted. Noted pt was to discharge, but appealed, possibly now awaiting home vs TCU per LSW note. RD visited pt at bedside this afternoon, pt notes taking meals well, again reviewed fluid recommendations and did discuss plan for above as likely pt can have at least 2 L of fluid per day, reviewed tips for monitoring fluids at home, reviewed the continued importance of consistent meals at home for improved nutritional status and wound healing, reviewed supplement noting pt has some excess stock in room, pt agreeing to work on consuming those and adjustments to order above.     Wt Readings from Last 20 Encounters:   01/05/24 63.5 kg (140 lb)   12/29/23 62.6 kg (138 lb)   11/25/23 62.6 kg (138 lb 0.1 oz)   04/26/23 66.1 kg (145 lb 11.2 oz)   03/17/23 67.6 kg (149 lb)   03/31/22 59 kg (130 lb)     Weight assessment: No current weight to assess, per prior assessment, current  weight appears stable/up from 2 months ago, non-significant 3.4% weight loss in >6 months.      MALNUTRITION  % Intake: No decreased intake noted  % Weight Loss: Weight loss does not meet criteria  Subcutaneous Fat Loss: Facial region: mild  Muscle Loss: Temporal, facial & jaw region: mild - underlying chronic medical diagnosis noted as well   Fluid Accumulation/Edema: Mild to trace per flowsheets   Malnutrition Diagnosis: Moderate malnutrition in the context of acute on chronic illness or injury      Previous Goals   Patient to consume % of nutritionally adequate meal trays TID, or the equivalent with supplements/snacks  Evaluation: Met    Previous Nutrition Diagnosis  Predicted inadequate nutrient intake related to multifactorial (acute on chronic illness, reduced appetite, other) as evidenced by pt reports   Evaluation: Improving, diagnosis adjusted to below     CURRENT NUTRITION DIAGNOSIS  Predicted increased nutrient needs related to wound healing as evidenced by therapeutic recommendations       INTERVENTIONS  Implementation  Collaboration with Provider - as noted above   Medical food supplement therapy - order adjusted as above     Goals  Patient to consume % of nutritionally adequate meal trays TID, or the equivalent with supplements/snacks.    Monitoring/Evaluation  Progress toward goals will be monitored and evaluated per protocol.     Janis Epperson RD, CNSC, LD  [place] [address] Med/Surg RD pager: [phone]

## 2024-01-19 NOTE — PLAN OF CARE
Physical Therapy Discharge Summary    Reason for therapy discharge:    All goals and outcomes met, no further needs identified.    Progress towards therapy goal(s). See goals on Care Plan in [facility] electronic health record for goal details.  Goals met    Therapy recommendation(s):    Continued therapy is recommended.  Rationale/Recommendations:  HH PT consult for home safety and progress sitting balance/core strengthening. .    pt is overall near her most recent basline, spouse and pt demo adequate bed mobility and transfer with OH lift or dependent lateral transfer as they were performing at home. Unfortunately, meaningful PT rehab potential is stalled by NWB B LE use as pt not able to progress transfers or gait until can bear weight. She otherwise continues to have all equipment and assist at home for safe discharge home (has hospital bed, power wc, Karyn lift). Pt does have PT needs as is generally weak and deconditioned from prolonged immobility and PMH of MS. However, these needs are not great enough to warrant a TCU stay as PT specific needs and could be addressed with HH/OP PT for general LE/core exercise program. Currently next clinic visit 1/24 to assess readiness to bear weight. Once pt is able to bear weight she will certainly have increased skilled PT needs. However, again, limited and not justifiable for TCU stay based on PT/mobility needs alone while remains NWB B LE. Pt has received handout for LE HEP for use at home at discharge.

## 2024-01-19 NOTE — PLAN OF CARE
Repositioned Q2. Prevalon boot on L foot. R leg cast.     LBM 1/18. Brief on. Incontinent of urine.     1500ml fluid restriction.     L PIV SL.     Wound care due 1/20.     Plan: pt appealed discharge. See SW note for details on appeal.

## 2024-01-19 NOTE — PROGRESS NOTES
Care Management Follow Up    Length of Stay (days): 13    Expected Discharge Date: 01/18/2024     Concerns to be Addressed: discharge planning     Patient plan of care discussed at interdisciplinary rounds: Yes    Anticipated Discharge Disposition: Transitional Care     Anticipated Discharge Services:    Anticipated Discharge DME:      Patient/family educated on Medicare website which has current facility and service quality ratings: yes  Education Provided on the Discharge Plan: Yes  Patient/Family in Agreement with the Plan: yes    Referrals Placed by CM/SW: External Care Coordination  Private pay costs discussed: Not applicable    Additional Information:  Received call from spouse, Manuel, requesting that writer follow up with [facility] referral. Writer assured him that the referral was sent on 1/11 and 1/16 and several phone calls had been made to [facility] for follow up. But no one has heard back on the pending request. Writer called [facility] to follow up with Vanessa in admissions. She explained that the patient was denied but could not give a reason. Writer explained the situation and Vanessa requested that the referral be re-sent. Writer resent referral and will await determination.      TCU referrals pending  [facility]  [address] [place] [address]  Phone: [phone]  Fax: [phone]      Elly Pulliam, MSN, APRN, [address] [phone]  Nurse Coordinator    SEARCHABLE in [facility] - search CARE COORDINATOR     For Weekend & Holiday on call RN Care Coordinator:  (Tasks: Home care, home infusion, medical equipment, transportation, IMM & GONZALEZ forms, etc.)  [facility] (0800 - 1630) Saturday and Sunday    Units: 5A, 5B, & 5C: [phone]    Units: 6B, 6C, 6D: [phone]    Units: 7A, 7B, 7C, 7D: [phone]    Units: 6A/ICU : [phone]     [facility] ([phone]) Saturday and Sunday    Units: 6 Med/Surg, 8A, 10 ICU, & Pediatric Units: [phone]     Units: 5 Ortho, 5Med/Surg & WB ED: [phone]

## 2024-01-19 NOTE — CONSULTS
"SPIRITUAL HEALTH SERVICES - Consult Note  5B  Referral Source/Reason for Visit: Patient request, routine consult    Summary and Recommendations -  Pt Marylee reflected on feeling the tension of \"wanting to be home but wanting to heal my wounds well\". She is hopeful to have her cast removed next week.   She identified concern re: burning her  out as a caretaker.   Marylee is a verbal processor and can tend to tangential thinking at times.   Marylee has a strong support network, though became tearful as she remembered her eldest daughter experiencing a stillbirth in April.   Her  works as a  and her luis alfredo is important to her. She attends an Religion [employer] in [facility], which is a significant source of comfort and community. I offered to pray, and she accepted gladly.     Plan: Marylee expressed appreciation for  support, but will likely be discharged before follow up is possible. Will note this in case of future hospital stays.     Faina Larsen M.Div.  Chaplain Resident  Pager [phone]    SHS available 24/7 for emergent requests/referrals, either by paging the on-call  or by entering an ASAP/STAT consult in [employer], which will also page the on-call .    Assessment    Saw pt Marylee Woods per routine consult.    Patient/Family Understanding of Illness and Goals of Care - Marylee is unclear on where she will be discharged, ie a TCU vs. Home. She is hopeful her cast will be removed next week, which will enable her PT to begin. She identified a desire to be home, but concern over \"my yeast infection\" and \"my wound doesn't look much better to me\". She believes she is waiting to hear from a TCU currently.     Distress and Loss - Marylee exhibited some stress about the liminal space she is in and her health status. She also reflected on a difficult week in November during which she ended up \"intubated for five days in the ICU... I still don't know what " "happened\". Marylee also became tearful as she told the story of her eldest daughter's stillbirth in April. I provided emotional support as Marylee reviewed these difficult experiences.     Strengths, Coping, and Resources - Marylee is very close with her  and three children, though her daughters both live in [place]. She also shared, \"My sister is so on top of it\" and told stories about supportive friends in Bible study. She nay well and enjoys watching movies and reading in the hospital.     Meaning, Beliefs, and Spirituality - Marylee attends an [employer] in [facility], and derives great meaning and comfort from her luis alfredo. Her  works as a hospice chaplain. She values prayer and  support.         "

## 2024-01-19 NOTE — PLAN OF CARE
Occupational Therapy Discharge Summary    Reason for therapy discharge:    All goals and outcomes met, no further needs identified.    Progress towards therapy goal(s). See goals on Care Plan in [facility] electronic health record for goal details.  Goals met    Therapy recommendation(s):    Pt has met hospital based goals with current restrictions. Pt is near recent baseline (since fracturing B LE's) with DME needs in place at home. Due to ongoing NWB B LE's, progression with functional transfers and ADLs is stalled until cleared to weight bear through LE's. Pt would benefit from home OT/PT safety evals to continue working on wc based ADLs in home, core strengthening/sitting balance, and overall UE strengthening/FM coord. Once pt cleared for LE weight bearing, pt would certainly benefit from ongoing OT/PT for strengthening, and progression of safety and IND with mobility and ADLs.    Pt to continue getting up to wc with nursing using OH lift. Pt has UE HEP in place in room and demonstrated understanding and motivation to complete.

## 2024-01-20 VITALS
OXYGEN SATURATION: 97 % | HEART RATE: 76 BPM | TEMPERATURE: 98.1 F | DIASTOLIC BLOOD PRESSURE: 53 MMHG | WEIGHT: 129.41 LBS | RESPIRATION RATE: 17 BRPM | BODY MASS INDEX: 19.68 KG/M2 | SYSTOLIC BLOOD PRESSURE: 116 MMHG

## 2024-01-20 LAB — PLATELET # BLD AUTO: 194 10E3/UL (ref 150–450)

## 2024-01-20 PROCEDURE — 250N000013 HC RX MED GY IP 250 OP 250 PS 637: Performed by: STUDENT IN AN ORGANIZED HEALTH CARE EDUCATION/TRAINING PROGRAM

## 2024-01-20 PROCEDURE — 250N000013 HC RX MED GY IP 250 OP 250 PS 637

## 2024-01-20 PROCEDURE — 250N000013 HC RX MED GY IP 250 OP 250 PS 637: Performed by: INTERNAL MEDICINE

## 2024-01-20 PROCEDURE — 85049 AUTOMATED PLATELET COUNT: CPT | Performed by: INTERNAL MEDICINE

## 2024-01-20 PROCEDURE — 250N000011 HC RX IP 250 OP 636: Performed by: INTERNAL MEDICINE

## 2024-01-20 PROCEDURE — 99239 HOSP IP/OBS DSCHRG MGMT >30: CPT | Performed by: INTERNAL MEDICINE

## 2024-01-20 PROCEDURE — 36415 COLL VENOUS BLD VENIPUNCTURE: CPT | Performed by: INTERNAL MEDICINE

## 2024-01-20 RX ADMIN — LEVOTHYROXINE SODIUM 100 MCG: 100 TABLET ORAL at 09:03

## 2024-01-20 RX ADMIN — ENOXAPARIN SODIUM 40 MG: 40 INJECTION SUBCUTANEOUS at 09:00

## 2024-01-20 RX ADMIN — DULOXETINE HYDROCHLORIDE 80 MG: 60 CAPSULE, DELAYED RELEASE ORAL at 08:58

## 2024-01-20 RX ADMIN — OXYCODONE HYDROCHLORIDE 5 MG: 5 TABLET ORAL at 01:20

## 2024-01-20 RX ADMIN — ACYCLOVIR 400 MG: 400 TABLET ORAL at 08:58

## 2024-01-20 RX ADMIN — BACLOFEN 20 MG: 20 TABLET ORAL at 08:58

## 2024-01-20 RX ADMIN — LATANOPROST 1 DROP: 50 SOLUTION OPHTHALMIC at 09:00

## 2024-01-20 RX ADMIN — ACETAMINOPHEN 975 MG: 325 TABLET, FILM COATED ORAL at 08:59

## 2024-01-20 RX ADMIN — GABAPENTIN 600 MG: 300 CAPSULE ORAL at 08:58

## 2024-01-20 ASSESSMENT — ACTIVITIES OF DAILY LIVING (ADL)
ADLS_ACUITY_SCORE: 57

## 2024-01-20 NOTE — PLAN OF CARE
[id number]    Pt is Aox4. Not oob this shift. T/R as pt allows overnight. Woke up with spasming pain down R leg around 0100. Prn oxy given. Pt able to fall back asleep. L PIV SL. Continue POC.       to follow up on the appeal/discharge matter in the AM.

## 2024-01-20 NOTE — PLAN OF CARE
Goal Outcome Evaluation:    Medically stable for discharge. Discharge appealed.     Wound care on sacrum done today.     Incontinent of bowel and bladder (no BM today)     Up in power wheelchair for a few hours today.     Will continue with plan of care

## 2024-01-20 NOTE — PROGRESS NOTES
Care Management Discharge Note    Discharge Date: 01/20/2024       Discharge Disposition: HHC, [facility]    Discharge Services:  W/C Transportation    Discharge DME:  N/A    Discharge Transportation: health plan transportation    Private pay costs discussed: Not applicable    Does the patient's insurance plan have a 3 day qualifying hospital stay waiver?  No    PAS Confirmation Code:  N/A  Patient/family educated on Medicare website which has current facility and service quality ratings: yes    Education Provided on the Discharge Plan: Yes  Persons Notified of Discharge Plans: Patient  Patient/Family in Agreement with the Plan: yes    Handoff Referral Completed: Yes    Additional Information:  SW reviewed pt chart for discharge plan. Pt had appealed discharge home with home care. [facility] letter received 1/19@6:24PM.     SW called by charge with request from pt spouse. Pt spouse reports they would like to decline transportation assistance and scheduled van through [facility] discharge rides for return home. SW cancelled [facility] ride scheduled 11:37AM - 12:22PM. SW also updated pt spouse on TCU referral and [facility] decision on appeal. Pt spouse informed SW that 2nd appeal was requested over phone, but following further discussion, SW informed they would like to cancel that 2nd request. SW faxed forms to [facility] and will continue to follow for any additional needs.    STEPHANY Vargas   1/20/2024       Social Work and Care Management Department       SEARCHABLE in [facility] - search SOCIAL WORK       [facility] (0800 - 1630) Saturday and Sunday     Units: 4A, 4C, & 4E Pager: [phone]     Units: 5A & 5C Pager: [phone]     Units: 6A & 6B   Pager: [phone]     Units: 6C Pager: [phone]     Units: 7A & 7B  Pager: [phone]     Units: 7C Pager: [phone]     Unit: [facility] ED Pager: [phone]      [facility] ([phone]) Saturday and Sunday      Units: 5 Ortho,  5 Med/Surg & WB ED  Pager:[phone]     Units: 6 Med/Surg, 8A, & 10A ICU  Pager: [phone]        After hours (1630 - 0000) Saturday & Sunday; (0682-1724) Mon-Fri; ([phone]) [facility] Recognized Holidays     Units: ALL  Pager: [phone]      STEPHANY Vargas

## 2024-01-22 ENCOUNTER — PATIENT OUTREACH (OUTPATIENT)
Dept: CARE COORDINATION | Facility: CLINIC | Age: 69
End: 2024-01-22
Payer: MEDICARE

## 2024-01-22 NOTE — PROGRESS NOTES
Connected Care Resource Center:   [facility] Care Resource Center Contact  [facility]/Voicemail     Clinical Data: Post-Discharge Outreach     Outreach attempted x 2.  Left message on patient's voicemail, providing [facility]'s central phone number of [phone] ([phone]) for questions/concerns and/or to schedule an appt with an [facility] provider, if they do not have a PCP.      Plan:  [facility] will do no further outreaches at this time.       REBEKA Gaona  Connected Care Resource Center, [facility]    *Connected Care Resource Team does NOT follow patient ongoing. Referrals are identified based on internal discharge reports and the outreach is to ensure patient has an understanding of their discharge instructions.     None known

## 2024-01-23 ENCOUNTER — TELEPHONE (OUTPATIENT)
Dept: FAMILY MEDICINE | Facility: CLINIC | Age: 69
End: 2024-01-23
Payer: MEDICARE

## 2024-01-23 DIAGNOSIS — R11.0 NAUSEA: Primary | ICD-10-CM

## 2024-01-23 RX ORDER — ONDANSETRON 4 MG/1
4 TABLET, ORALLY DISINTEGRATING ORAL EVERY 8 HOURS PRN
Qty: 20 TABLET | Refills: 0 | Status: SHIPPED | OUTPATIENT
Start: 2024-01-23 | End: 2024-01-31

## 2024-01-23 NOTE — TELEPHONE ENCOUNTER
Situation: Was seen in ED for hyponatremia and was told to discontinue hydrochlorothiazide. Concerned about this discontinued med and has concerns due to ongoing nausea    Background and Assessment: Just returned home from hospital on Saturday, has not yet been checking BP at home  -in agreement to start checking BP at home  Does endorse nausea and mild dizziness at times  Denies headache, SOB, chest pain  -taking oxycodone as prescribed at discharge  -patient feels she has also had worse  strength and is dropping things frequently  -requesting an anti-emetic, states she was taking zofran while hospitalized and now cannot eat a meal without some nausea    Disposition/Recommendation from PCP: Currently scheduled for 1/31/24 to see PCP. Can this wait until 1/31? Can any anti-emetic be prescribed in the interim? Patient will record home BP readings and report.    SAEID CarnesN, RN  [facility]

## 2024-01-23 NOTE — TELEPHONE ENCOUNTER
I sent Zofran in.  Do you think patient is stable enough to wait until 1/31; or should she be seen sooner per your conversation?  Is constipation playing a part in this at all with pain medication she is taking?  Regardless; I do think she should have labs sooner than next week if feeling nausea and dizziness.  I've ordered; she can make lab only appointment or if she has home care RN they should be able to draw it.      Dr. Carolina Pulliam MD / [facility]

## 2024-01-23 NOTE — TELEPHONE ENCOUNTER
Great; thank you.  Agree with plan below.  Home care orders approved.  Dr. Carolina Pulliam MD / [facility]

## 2024-01-23 NOTE — TELEPHONE ENCOUNTER
Home Health Care    Reason for call:  Verbal Orders    Orders are needed for this patient.  PT: Delay due to staffing   OT: Delay due to staffing  Skilled Nursing: Delay due to staffing    Pt Provider: Dr. Pulliam    Phone Number Homecare Nurse can be reached at: [phone]

## 2024-01-23 NOTE — TELEPHONE ENCOUNTER
Dr. Pulliam - updates below, also home care orders requested    1) and 4) Patient notified of rx sent in, home care RN to do lab draw on 1/26 unless for some reason unable, in which case pt prefers labs pre-visit on 1/31 so she doesn't have to make a second trip in-clinic  2) RN discussed with patient, both feel comfortable with waiting to be seen on 1/31; patient preference is to see PCP on scheduled date. Patient feels she may have something more neurologic going on in relation to her  strength. Neurology follows and patient is planning to call them for sx triage specific to  strength and dropping things more frequently.  3) Patient did not endorse any constipation concerns at present, but did endorse it has been challenging to drink enough when nausea occurs, adequate PO fluid intake when not nauseated. Patient currently taking miralax as recommended at discharge and glycerin suppositories if needed. Has had two small bowel movements since discharge. Pt stated she is currently far less mobile in her powerchair d/t femur fx, is fully non-weight bearing. Home PT currently with orthopedic surgery visit scheduled tomorrow.    Writer spoke with home care intake RN. Assigned home care RN can do draw at second home care visit date this week with faxed orders.  Orders faxed to home care intake fax number:  [phone]    ==========    Home Care Orders Requested  Home Care is calling regarding an established patient with [facility].    Requesting orders from: Carolina Pulliam  Provider is following patient: Yes  Is this a 60-day recertification request?  No    Skilled Nursing  Request for initial certification (first set of orders)   Frequency:  2x/wk for 8 wks    Information was gathered and will be sent to provider for review.  RN will contact Home Care with information after provider review.  Confirmed ok to leave a detailed message with call back.  Contact information confirmed and updated as  needed.    Ever Logan RN

## 2024-01-23 NOTE — TELEPHONE ENCOUNTER
Called home care to get an estimated start date for services. Left detailed voicemail.    Myriam Gonzalez RN, BSN, PHN  [facility]  [phone]

## 2024-01-24 ENCOUNTER — OFFICE VISIT (OUTPATIENT)
Dept: ORTHOPEDICS | Facility: CLINIC | Age: 69
End: 2024-01-24
Payer: MEDICARE

## 2024-01-24 ENCOUNTER — ANCILLARY PROCEDURE (OUTPATIENT)
Dept: GENERAL RADIOLOGY | Facility: CLINIC | Age: 69
End: 2024-01-24
Attending: STUDENT IN AN ORGANIZED HEALTH CARE EDUCATION/TRAINING PROGRAM
Payer: MEDICARE

## 2024-01-24 DIAGNOSIS — S72.401D CLOSED FRACTURE OF DISTAL END OF RIGHT FEMUR WITH ROUTINE HEALING, UNSPECIFIED FRACTURE MORPHOLOGY, SUBSEQUENT ENCOUNTER: ICD-10-CM

## 2024-01-24 DIAGNOSIS — S82.52XD CLOSED DISP FRACTURE OF LEFT MEDIAL MALLEOLUS WITH ROUTINE HEALING: ICD-10-CM

## 2024-01-24 DIAGNOSIS — S72.401D CLOSED FRACTURE OF DISTAL END OF RIGHT FEMUR WITH ROUTINE HEALING, UNSPECIFIED FRACTURE MORPHOLOGY, SUBSEQUENT ENCOUNTER: Primary | ICD-10-CM

## 2024-01-24 PROCEDURE — 73600 X-RAY EXAM OF ANKLE: CPT | Mod: LT | Performed by: RADIOLOGY

## 2024-01-24 PROCEDURE — 73560 X-RAY EXAM OF KNEE 1 OR 2: CPT | Mod: RT | Performed by: RADIOLOGY

## 2024-01-24 PROCEDURE — 99213 OFFICE O/P EST LOW 20 MIN: CPT | Performed by: STUDENT IN AN ORGANIZED HEALTH CARE EDUCATION/TRAINING PROGRAM

## 2024-01-24 NOTE — LETTER
1/24/2024         RE: Marylee Woods  [address]  [facility] [address]        Dear Colleague,    Thank you for referring your patient, Marylee Woods, to the [facility] [place]. Please see a copy of my visit note below.        [facility]  Orthopaedic Surgery Consultation by Jero Em M.D.    Marylee Woods MRN# [id number]   Age: 68 year old YOB: 1955     Requesting physician: No ref. provider found  Carolina Pulliam     Background history:  DX:  Multiple sclerosis wheelchair-bound  Graves' disease  CKD  Hypertension  OSAS  Depressive disorder  Osteoporosis  Tobacco use disorder    TREATMENTS:  2009, right intertrochanteric femur fracture for which DHS, Dr. Xie  2013, ORIF tibia fracture with a medullary nail, Dr. Cast  2014, laparoscopic cholecystectomy  2018, ORIF left distal femur fracture, Dr. 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     Patient was admitted after falls with a displaced, comminuted right distal femur fracture and left minimally displaced medial malleolus fracture. Patient has a significant amount of hardware from prior orthopedic injuries.  Osteopenic appearing bone.  Wheelchair-bound.  Given patient's lower demand lifestyle and prior hardware in the right femur, concern of surgical intervention is the creation of a stress riser or increasing risk for a new fracture along with the risks associated with surgery/ORIF.  Alternatively we discussed the options of nonsurgical treatment.  After discussing these options with the patient it was decided to proceed with nonsurgical treatment.  The right  lower extremity of patient was placed in a long leg cast . With regards to her medial malleolus fracture, again given lower demand will plan to treat in a CAM boot.     Today patient is approximately 8 weeks after the above-mentioned traumatic event.  States that she is doing relatively well.  She is not experiencing any significant pain of the right lower extremity.  The cast has been removed.  The cam boot has been removed.  She is wondering when she can have her AFOs fitted.    At baseline patient is able to transfer from bed to chair and walk a few feet with the assistance of a walker.    Social:   Occupation:   Living situation: With   Hobbies / Sports:    Smoking:Yes  Alcohol: No  Illicit drug use: No         Physical Exam:     EXAMINATION pertinent findings:   PSYCH: Pleasant, healthy-appearing, alert, oriented x3, cooperative. Normal mood and affect.  VITAL SIGNS: not currently breastfeeding.  Reviewed nursing intake notes.   There is no height or weight on file to calculate BMI.  RESP: non labored breathing   ABD: benign, soft, non-tender, no acute peritoneal findings  SKIN: grossly normal   LYMPHATIC: grossly normal, no adenopathy, no extremity edema  NEURO: grossly normal , no motor deficits  VASCULAR: satisfactory perfusion of all extremities   MUSCULOSKELETAL:   Alignment: Normal alignment right lower extremity.  Patient is seated in wheelchair.  Peripheral edema right lower extremity.  No tenderness to palpation over fracture site.  Axial compression not painful.  Range of motion knee right flexion/extension 100/0/0.  Deep flexion is painful.           Data:   All laboratory data reviewed  All imaging studies reviewed by me personally.    XR knee/femur right 1/24/2024:  My interpretation: Shortening of distal femur but adequate alignment in both AP and lateral imaging studies.  Appearance of progressive callus formation around fracture site.  No signs of secondary displacement.  Severely  osteopenic bone.    XR ankle left 1/24/2024:  My interpretation: Minimally displaced medial malleolar fracture.         Assessment and Plan:   Assessment:  68-year-old female presenting for follow-up after minimally displaced and shortened distal femur fracture for which nonsurgical treatment was initiated by means of long-leg cast.  Also following up after minimally displaced left medial malleolar fracture for which cam boot was advised.  Clinically and radiographically healing fractures.     Plan:  I extensively discussed the fracture pattern and treatment options with the patient and her spouse.  At this point it appears that the fractures of the femur and distal tibia have healed well.  No further immobilization is needed.  For the first 4 weeks from now I would recommend wearing the hinged knee brace that she has for transfers.  She can start doing her pivoting transfers weightbearing as tolerated.  I would recommend her waiting for another month or so until the residual peripheral edema has subsided before fitting her AFOs.  All questions were answered.  Patient understands and agrees to the treatment plan as set forth.  We will follow-up with her on an as-needed basis.      Jero Em MD, PhD     Adult Reconstruction  [facility] Department of Orthopaedic Surgery    This note was created using dictation software and may contain errors.  Please contact the creator for any clarifications that are needed.

## 2024-01-29 ENCOUNTER — OFFICE VISIT (OUTPATIENT)
Dept: ENDOCRINOLOGY | Facility: CLINIC | Age: 69
End: 2024-01-29
Payer: MEDICARE

## 2024-01-29 ENCOUNTER — LAB (OUTPATIENT)
Dept: LAB | Facility: CLINIC | Age: 69
End: 2024-01-29
Payer: MEDICARE

## 2024-01-29 VITALS — SYSTOLIC BLOOD PRESSURE: 160 MMHG | HEART RATE: 76 BPM | DIASTOLIC BLOOD PRESSURE: 83 MMHG | OXYGEN SATURATION: 99 %

## 2024-01-29 DIAGNOSIS — R11.0 NAUSEA: ICD-10-CM

## 2024-01-29 DIAGNOSIS — M81.0 OSTEOPOROSIS OF MULTIPLE SITES: ICD-10-CM

## 2024-01-29 DIAGNOSIS — E89.0 POSTABLATIVE HYPOTHYROIDISM: ICD-10-CM

## 2024-01-29 DIAGNOSIS — R79.89 ELEVATED CORTISOL LEVEL: ICD-10-CM

## 2024-01-29 DIAGNOSIS — E04.2 MULTIPLE THYROID NODULES: ICD-10-CM

## 2024-01-29 DIAGNOSIS — R27.8 OTHER LACK OF COORDINATION: ICD-10-CM

## 2024-01-29 DIAGNOSIS — M81.0 OSTEOPOROSIS OF MULTIPLE SITES: Primary | ICD-10-CM

## 2024-01-29 LAB
ALBUMIN SERPL BCG-MCNC: 3.9 G/DL (ref 3.5–5.2)
ANION GAP SERPL CALCULATED.3IONS-SCNC: 8 MMOL/L (ref 7–15)
BASOPHILS # BLD AUTO: 0 10E3/UL (ref 0–0.2)
BASOPHILS NFR BLD AUTO: 1 %
BUN SERPL-MCNC: 15.3 MG/DL (ref 8–23)
CALCIUM SERPL-MCNC: 9.8 MG/DL (ref 8.8–10.2)
CALCIUM SERPL-MCNC: 9.8 MG/DL (ref 8.8–10.2)
CHLORIDE SERPL-SCNC: 104 MMOL/L (ref 98–107)
CREAT SERPL-MCNC: 0.71 MG/DL (ref 0.51–0.95)
DEPRECATED HCO3 PLAS-SCNC: 30 MMOL/L (ref 22–29)
EGFRCR SERPLBLD CKD-EPI 2021: >90 ML/MIN/1.73M2
EOSINOPHIL # BLD AUTO: 0.2 10E3/UL (ref 0–0.7)
EOSINOPHIL NFR BLD AUTO: 6 %
ERYTHROCYTE [DISTWIDTH] IN BLOOD BY AUTOMATED COUNT: 15.9 % (ref 10–15)
GLUCOSE SERPL-MCNC: 77 MG/DL (ref 70–99)
HCT VFR BLD AUTO: 31.9 % (ref 35–47)
HGB BLD-MCNC: 10 G/DL (ref 11.7–15.7)
IMM GRANULOCYTES # BLD: 0 10E3/UL
IMM GRANULOCYTES NFR BLD: 0 %
LYMPHOCYTES # BLD AUTO: 0.8 10E3/UL (ref 0.8–5.3)
LYMPHOCYTES NFR BLD AUTO: 26 %
Lab: NORMAL
MCH RBC QN AUTO: 32.1 PG (ref 26.5–33)
MCHC RBC AUTO-ENTMCNC: 31.3 G/DL (ref 31.5–36.5)
MCV RBC AUTO: 102 FL (ref 78–100)
MONOCYTES # BLD AUTO: 0.2 10E3/UL (ref 0–1.3)
MONOCYTES NFR BLD AUTO: 7 %
NEUTROPHILS # BLD AUTO: 1.9 10E3/UL (ref 1.6–8.3)
NEUTROPHILS NFR BLD AUTO: 60 %
NRBC # BLD AUTO: 0 10E3/UL
NRBC BLD AUTO-RTO: 0 /100
PERFORMING LABORATORY: NORMAL
PHOSPHATE SERPL-MCNC: 3.7 MG/DL (ref 2.5–4.5)
PLATELET # BLD AUTO: 240 10E3/UL (ref 150–450)
POTASSIUM SERPL-SCNC: 4.6 MMOL/L (ref 3.4–5.3)
PTH-INTACT SERPL-MCNC: 70 PG/ML (ref 15–65)
RBC # BLD AUTO: 3.12 10E6/UL (ref 3.8–5.2)
SODIUM SERPL-SCNC: 142 MMOL/L (ref 135–145)
SPECIMEN STATUS: NORMAL
TEST NAME: NORMAL
WBC # BLD AUTO: 3.1 10E3/UL (ref 4–11)

## 2024-01-29 PROCEDURE — 80069 RENAL FUNCTION PANEL: CPT | Performed by: PATHOLOGY

## 2024-01-29 PROCEDURE — 99215 OFFICE O/P EST HI 40 MIN: CPT | Performed by: INTERNAL MEDICINE

## 2024-01-29 PROCEDURE — 36415 COLL VENOUS BLD VENIPUNCTURE: CPT | Performed by: PATHOLOGY

## 2024-01-29 PROCEDURE — 85025 COMPLETE CBC W/AUTO DIFF WBC: CPT | Performed by: PATHOLOGY

## 2024-01-29 PROCEDURE — 82533 TOTAL CORTISOL: CPT | Mod: 90 | Performed by: PATHOLOGY

## 2024-01-29 PROCEDURE — 83970 ASSAY OF PARATHORMONE: CPT | Performed by: PATHOLOGY

## 2024-01-29 PROCEDURE — 82306 VITAMIN D 25 HYDROXY: CPT | Performed by: INTERNAL MEDICINE

## 2024-01-29 PROCEDURE — 99000 SPECIMEN HANDLING OFFICE-LAB: CPT | Performed by: PATHOLOGY

## 2024-01-29 RX ORDER — ALBUTEROL SULFATE 90 UG/1
1-2 AEROSOL, METERED RESPIRATORY (INHALATION)
Status: CANCELLED
Start: 2024-02-21

## 2024-01-29 RX ORDER — EPINEPHRINE 1 MG/ML
0.3 INJECTION, SOLUTION, CONCENTRATE INTRAVENOUS EVERY 5 MIN PRN
Status: CANCELLED | OUTPATIENT
Start: 2024-02-21

## 2024-01-29 RX ORDER — DEXAMETHASONE 4 MG/1
TABLET ORAL
Qty: 2 TABLET | Refills: 0 | Status: SHIPPED | OUTPATIENT
Start: 2024-01-29 | End: 2024-02-06

## 2024-01-29 RX ORDER — ALBUTEROL SULFATE 0.83 MG/ML
2.5 SOLUTION RESPIRATORY (INHALATION)
Status: CANCELLED | OUTPATIENT
Start: 2024-02-21

## 2024-01-29 RX ORDER — DIPHENHYDRAMINE HYDROCHLORIDE 50 MG/ML
50 INJECTION INTRAMUSCULAR; INTRAVENOUS
Status: CANCELLED
Start: 2024-02-21

## 2024-01-29 ASSESSMENT — PAIN SCALES - GENERAL: PAINLEVEL: MILD PAIN (2)

## 2024-01-29 NOTE — PROGRESS NOTES
=========================================================================================    ASSESSMENT/PLAN:     1.  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  Recent TSH has been elevated, in the context of missed levothyroxine dosages.  The patient confirms she has been taking levothyroxine as instructed since being recently discharged from the hospital.  The plan is to recheck the thyroid hormone levels in 1 month and adjust the dose of levothyroxine accordingly.    2.  Osteoporosis, severe, based on history of femoral fractures after falling from a standing height, in 2009 and 2018 and after a wheelchair accident in 11/2023.    The DXA scan from 11/21 confirmed very low T-scores.  Risk factors for osteoporosis identified: Postmenopausal status, sedentary lifestyle due to MS, treatment with PPI, hyperthyroidism, smoking.  Screening for celiac disease was negative.   From 6/2020 until 2021, the patient was medicated with Fosamax (for ~1 year).  She received 2 dosages of Reclast in 2/22 and 3/23.    Recommendations:  Follow-up BMP, albumin, vitamin D and PTH levels  Adjust the dose of vitamin D and calcium based on the lab results.   Transition to Evenity for one year, then resume Reclast.  Counseled the patient on the questionable increased risk of cardiovascular disease.    2.  Borderline cortisol on the dexamethasone suppression test from December 2021  Salivary cortisol was unreliable due to mucosal bleeding.   Clinically, the patient does not endorse definite signs or symptoms suggestive of Cushing syndrome.   Plan:   Pursue an 8 mg dexamethasone suppression test.     4. Thyroid nodules, with no ultrasound features suggestive of malignancy  Clinical exam today was unremarkable.    Orders Placed This Encounter   Procedures    Dexa hip/pelvis/spine    Dexa Wrist/Heel    Phosphorus    Albumin level    Calcium    Parathyroid Hormone Intact    25  Hydroxyvitamin D2 and D3    TSH    T4 free    [facility]; CINP; Cortisol, Mass Spectrometry, Serum (Laboratory Miscellaneous Order)     =========================================================================================    1.  Post ablative hypothyroidism, which ensured following radioiodine treatment for Graves' disease  Marylee follows up with Dr. Redman for MS, at [facility].  The patient has had the thyroid hormone levels checked quite frequently, through her neurology clinic, given prior history of treatment with [place], in [id number].  She was formally diagnosed with seropositive Graves' disease in October 2020.  Thyroid-stimulating immunoglobulins were positive at 6.4. Subsequently, she underwent treatment with 9.3 mCi I-131, on 2/9/2021.  Prior to treatment, the scan revealed a 24-hour uptake of 87.5%, with a computer estimated weight of 42.2 g.  The radiotracer uptake was uniform, with the exception of the caudal region in the inferior left thyroid lobe, which corresponded to a nodule biopsied in 2017.     In 2017, she was incidentally diagnosed with thyroid nodules during a CAT scan.  The thyroid ultrasound from 9/7/2017 revealed multiple thyroid nodules.  The left inferior #4 thyroid nodule, measuring 2.2 cm, was benign on biopsy. Overall, the nodules had a benign ultrasound appearance, mostly spongiform.  The biopsied nodule had an internal macrocalcification, minimal internal vascularity, but no definite features suggestive of malignancy.  The patient has no prior history of radiation exposure or a family history of thyroid disease.  On the follow-up thyroid ultrasound from 1/14/2021, there were no significant changes of the thyroid nodules.    This month, she was hospitalized for hyponatremia, UTI, sacral ulcers, lactic acidosis, DENNISE.  At the time of the hospitalization, on 1/5/2024, TSH was elevated at 52.6, while free T4 was normal at 1.06.  According to  the notes, the patient missed levothyroxine dosages and the dose of levothyroxine was not changed.  Prior TSH level was 30.5, in January 2023.  Reports being compliant in taking the levothyroxine as instructed.  She continues to endorse fatigue, constipation and cold intolerance.      2. Osteoporosis     Prior fractures:  2009 Right nondisplaced intertrochanteric fracture.  2013 Left tibial and fibular fracture   10/2018 Transverse fracture of the distal left femoral diaphysis, after falling from standing height.  6/2020 Left tibial plateau fracture   11/2023 right distal femoral fracture and left malleolus fracture after the wheelchair felt on a side.  Orthopedics following and managing conservatively.    Treatment with Fosamax was recommended by her orthopedic surgeon, in 2018. As per patient, she started Fosamax in 2020 and she took it for approximately 1 year.  For the last 6-7 years, she has been using a wheelchair.    She remains on treatment with Pepcid, at night.  Marylee smoked since age 17, with intermittent breaks, generally less than half a pack a day.  She quit smoking in 2022.   She has both upper and lower dentures.   The left foot has a heel sore and wears a boot.     Her  confirms she has been taking the following calcium and vitamin D supplements:  600 mg calcium +800 units vitamin D BID. The calcium is  >4 hrs from levothyroxine. Takes 2 tablets at night.   A multivitamin which contains 1000 units vitamin D daily. Was told to change to prenatal MVI.   She used to take vitamin D 5000 units daily and this was discontinued by me in 2023, given the vitamin D toxic levels.  Trying to have cheese in her diet. Doesn't drink milk.     On the DEXA scan images from 11/5/2021, L2-L4 T score was -5.3, left femoral neck T score was -5.1, total left femur T score -6, 33% distal radius T score -3.5.    The patient received 2 dosages of Reclast, on 2/24/2022 and 3/24/23.   Recent calcium levels  have been normal.  GFR in the 80s to 90s.  Most recent PTH was 43, in January 2023, when vitamin D level was 119.  Following this lab work, the high-dose of 5000 units vitamin D was discontinued.    3. Adrenal nodularity, incidentally noticed on the abdominal CT images from June 2021, small and bilateral.     Pertinent labs reviewed:  1/6/24 cortisol 12.2 at 9:38 am   11/5/21   ACTH<10, cortisol 7.9  2/4/22 salivary cortisol 0.624 (0.022-0.254) - contaminated by blood   12/21 negative tissue transglutaminase antibodies, 1 mg dexamethasone suppression test revealed a cortisol level 2.2, with an appropriate dexamethasone level.    2011 cortisol 23.2     RA  Systemic symptoms: fatigue; weight stable. Appetite is good.   Longstanding dry eyes - uses eye drops   Otolaryngeal symptoms: some dysphagia - intermittent and longstanding   Cardiovascular symptoms: no CP or palpitations   Pulmonary symptoms: No pulmonary symptoms.  Gastrointestinal symptoms: constipation relived by miralax   Genitourinary symptoms: No genitourinary symptoms.  Endocrine symptoms: longstanding cold intolerance  Hematologic symptoms: No hematologic symptoms.  Musculoskeletal symptoms: occasional hip pain with movement     Skin symptoms: dry skin, hair loss; no easy bruising noted; no stretch marks, no hirsutism or acne    Past Medical History  Past Medical History:   Diagnosis Date    Gastro-oesophageal reflux disease     Multiple sclerosis (H) 1990s   May 2020, diagnosed with Covid  Depression  Pancytopenia dating back to at least 2014  Hyperlipidemia  Lung nodules  Candida esophagitis  UTI  R intertrochanteric femoral fracture 2009   Left tibial and fibular fracture in 2013  Left tibial plateau fracture June 2020   Left femoral fracture in 10/2018, after falling from standing height (above the knee)    Medications    Current Outpatient Medications:     acetaminophen (TYLENOL) 500 MG tablet, Take 1-2 tablets (500-1,000 mg) by mouth every 8 hours  as needed for mild pain or fever (greater than 101 degrees), Disp: , Rfl:     acyclovir (ZOVIRAX) 400 MG tablet, Take 1 tablet (400 mg) by mouth every 12 hours, Disp: 180 tablet, Rfl: 3    Armodafinil 200 MG TABS, Take 200 mg by mouth every morning, Disp: , Rfl:     baclofen (LIORESAL) 10 MG tablet, Take 10-30 mg by mouth 2 times daily Take 2 tablets (20 MG) by mouth every morning, 1 tablet (10 MG) by mouth daily in the afternoon as needed, and 3 tablets (30 MG) by mouth every evening before bedtime., Disp: , Rfl:     calcium carbonate 500 mg, elemental, (OSCAL 500) 1250 (500 Ca) MG TABS tablet, Take 2 tablets by mouth at bedtime, Disp: , Rfl:     DULoxetine (CYMBALTA) 20 MG capsule, Take 1 capsule (20 mg) by mouth daily Take along with 60 mg for a total of 80 mg, Disp: 90 capsule, Rfl: 3    famotidine (PEPCID) 40 MG tablet, Take 1 tablet (40 mg) by mouth At Bedtime, Disp: 90 tablet, Rfl: 3    gabapentin (NEURONTIN) 300 MG capsule, Take 2 capsules (600 mg) by mouth 4 times daily, Disp: , Rfl:     latanoprost (XALATAN) 0.005 % ophthalmic solution, INSTILL 1 DROP INTO BOTH EYES EVERY DAY AS DIRECTED PT WILL NEED TO BE SEEN FOR FUTURE REFILLS, Disp: , Rfl:     levothyroxine (SYNTHROID/LEVOTHROID) 100 MCG tablet, TAKE 1 TABLET BY MOUTH SIX TIMES A WEEK (Patient taking differently: Does not take on Sunday), Disp: 30 tablet, Rfl: 0    multivitamin w/minerals (THERA-VIT-M) tablet, Take 1 tablet by mouth every evening, Disp: , Rfl:     NITROFURANTOIN PO, , Disp: , Rfl:     ondansetron (ZOFRAN ODT) 4 MG ODT tab, Take 1 tablet (4 mg) by mouth every 8 hours as needed for nausea, Disp: 20 tablet, Rfl: 0    oxyCODONE (ROXICODONE) 5 MG tablet, Take 0.5 tablets (2.5 mg) by mouth every 6 hours as needed for moderate pain, Disp: 15 tablet, Rfl: 0    polyethylene glycol (MIRALAX) 17 GM/Dose powder, Take 17 g by mouth daily, Disp: 510 g, Rfl: 1    Allergies  Allergies   Allergen Reactions    Bactrim [Sulfamethoxazole-Trimethoprim]  Hallucination    Sulfamethizole     Amantadine      hallucinations    Budesonide     Budesonide-Formoterol Fumarate Rash     Family History  family history includes Cancer in her maternal grandfather; Heart Disease in her father, maternal grandmother, mother, and paternal grandfather.  She has positive family of autoimmune disease. Her mom has lupus and hypopituitarism. No family history of thyroid disease.  Maternal grandfather had throat cancer. Type 1 diabetes - maternal grandmother. Diabetes - maternal aunt.     Social History  , 2 children.  She denies drinking alcohol or using illicit drugs.  History of long-term smoking.  Social History     Tobacco Use    Smoking status: Every Day     Packs/day: .25     Types: Cigarettes     Last attempt to quit: 2022     Years since quittin.0    Smokeless tobacco: Never   Vaping Use    Vaping Use: Never used   Substance Use Topics    Alcohol use: Yes     Alcohol/week: 0.0 standard drinks of alcohol     Comment: occasional     Drug use: No      Physical Exam     Wt Readings from Last 10 Encounters:   24 58.7 kg (129 lb 6.6 oz)   23 62.6 kg (138 lb)   23 62.6 kg (138 lb 0.1 oz)   23 66.1 kg (145 lb 11.2 oz)   23 67.6 kg (149 lb)   22 59 kg (130 lb)   22 59 kg (130 lb)   22 59 kg (130 lb)   21 59 kg (130 lb)   21 59 kg (130 lb)     BP Readings from Last 6 Encounters:   24 116/53   24 112/60   24 121/73   24 132/78   23 129/76   23 112/53     BP (!) 160/83   Pulse 76   LMP  (LMP Unknown)   SpO2 99%   There is no height or weight on file to calculate BMI.     General appearance: Pleasant, lying in an electric scooter   Eyes: conjunctivae and extraocular motions are normal. Pupils are equal, round, and reactive to light. No lid lag, no stare.  HENT: neck no JVD, no bruits, no thyromegaly, no palpable nodules  Cardiovascular: regular rhythm, no murmurs, distal pulses  palpable, bilateral lower extremities edema, left foot boot  Respiratory: chest clear, no rales, no rhonchi  Neurologic: No resting tremor; upper left extremity paresis   Psychiatric: affect and judgment normal   Skin: No abdominal striae, no hirsutism or acne    DATA REVIEW    TSH   Date Value Ref Range Status   01/05/2024 52.60 (H) 0.30 - 4.20 uIU/mL Final   11/05/2021 1.33 0.40 - 4.00 mU/L Final   06/30/2021 191.68 (H) 0.40 - 4.00 mU/L Final     T4 Total   Date Value Ref Range Status   02/28/2011 7.6 4.7 - 13.3 ug/dl Final     T4 Free   Date Value Ref Range Status   06/30/2021 0.67 (L) 0.76 - 1.46 ng/dL Final     Free T4   Date Value Ref Range Status   01/05/2024 1.09 0.90 - 1.70 ng/dL Final     45 minutes spent on the date of the encounter doing chart review, history and exam, documentation and further activities as noted above.

## 2024-01-29 NOTE — LETTER
1/29/2024       RE: Marylee Woods  [address]  [facility] [address]     Dear Colleague,    Thank you for referring your patient, Marylee Woods, to the [facility]. Please see a copy of my visit note below.    =========================================================================================    ASSESSMENT/PLAN:     1.  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  Recent TSH has been elevated, in the context of missed levothyroxine dosages.  The patient confirms she has been taking levothyroxine as instructed since being recently discharged from the hospital.  The plan is to recheck the thyroid hormone levels in 1 month and adjust the dose of levothyroxine accordingly.    2.  Osteoporosis, severe, based on history of femoral fractures after falling from a standing height, in 2009 and 2018 and after a wheelchair accident in 11/2023.    The DXA scan from 11/21 confirmed very low T-scores.  Risk factors for osteoporosis identified: Postmenopausal status, sedentary lifestyle due to MS, treatment with PPI, hyperthyroidism, smoking.  Screening for celiac disease was negative.   From 6/2020 until 2021, the patient was medicated with Fosamax (for ~1 year).  She received 2 dosages of Reclast in 2/22 and 3/23.    Recommendations:  Follow-up BMP, albumin, vitamin D and PTH levels  Adjust the dose of vitamin D and calcium based on the lab results.   Transition to Evenity for one year, then resume Reclast.  Counseled the patient on the questionable increased risk of cardiovascular disease.    2.  Borderline cortisol on the dexamethasone suppression test from December 2021  Salivary cortisol was unreliable due to mucosal bleeding.   Clinically, the patient does not endorse definite signs or symptoms suggestive of Cushing syndrome.   Plan:   Pursue an 8 mg  dexamethasone suppression test.     4. Thyroid nodules, with no ultrasound features suggestive of malignancy  Clinical exam today was unremarkable.    Orders Placed This Encounter   Procedures    Dexa hip/pelvis/spine    Dexa Wrist/Heel    Phosphorus    Albumin level    Calcium    Parathyroid Hormone Intact    25 Hydroxyvitamin D2 and D3    TSH    T4 free    [facility]; [facility]; Cortisol, Mass Spectrometry, Serum (Laboratory Miscellaneous Order)     =========================================================================================    1.  Post ablative hypothyroidism, which ensured following radioiodine treatment for Graves' disease  Marylee follows up with Dr. Redman for MS, at [facility].  The patient has had the thyroid hormone levels checked quite frequently, through her neurology clinic, given prior history of treatment with [place], in [id number].  She was formally diagnosed with seropositive Graves' disease in October 2020.  Thyroid-stimulating immunoglobulins were positive at 6.4. Subsequently, she underwent treatment with 9.3 mCi I-131, on 2/9/2021.  Prior to treatment, the scan revealed a 24-hour uptake of 87.5%, with a computer estimated weight of 42.2 g.  The radiotracer uptake was uniform, with the exception of the caudal region in the inferior left thyroid lobe, which corresponded to a nodule biopsied in 2017.     In 2017, she was incidentally diagnosed with thyroid nodules during a CAT scan.  The thyroid ultrasound from 9/7/2017 revealed multiple thyroid nodules.  The left inferior #4 thyroid nodule, measuring 2.2 cm, was benign on biopsy. Overall, the nodules had a benign ultrasound appearance, mostly spongiform.  The biopsied nodule had an internal macrocalcification, minimal internal vascularity, but no definite features suggestive of malignancy.  The patient has no prior history of radiation exposure or a family history of thyroid disease.  On the follow-up  thyroid ultrasound from 1/14/2021, there were no significant changes of the thyroid nodules.    This month, she was hospitalized for hyponatremia, UTI, sacral ulcers, lactic acidosis, DENNISE.  At the time of the hospitalization, on 1/5/2024, TSH was elevated at 52.6, while free T4 was normal at 1.06.  According to the notes, the patient missed levothyroxine dosages and the dose of levothyroxine was not changed.  Prior TSH level was 30.5, in January 2023.  Reports being compliant in taking the levothyroxine as instructed.  She continues to endorse fatigue, constipation and cold intolerance.      2. Osteoporosis     Prior fractures:  2009 Right nondisplaced intertrochanteric fracture.  2013 Left tibial and fibular fracture   10/2018 Transverse fracture of the distal left femoral diaphysis, after falling from standing height.  6/2020 Left tibial plateau fracture   11/2023 right distal femoral fracture and left malleolus fracture after the wheelchair felt on a side.  Orthopedics following and managing conservatively.    Treatment with Fosamax was recommended by her orthopedic surgeon, in 2018. As per patient, she started Fosamax in 2020 and she took it for approximately 1 year.  For the last 6-7 years, she has been using a wheelchair.    She remains on treatment with Pepcid, at night.  Marylee smoked since age 17, with intermittent breaks, generally less than half a pack a day.  She quit smoking in 2022.   She has both upper and lower dentures.   The left foot has a heel sore and wears a boot.     Her  confirms she has been taking the following calcium and vitamin D supplements:  600 mg calcium +800 units vitamin D BID. The calcium is  >4 hrs from levothyroxine. Takes 2 tablets at night.   A multivitamin which contains 1000 units vitamin D daily. Was told to change to prenatal MVI.   She used to take vitamin D 5000 units daily and this was discontinued by me in 2023, given the vitamin D toxic  levels.  Trying to have cheese in her diet. Doesn't drink milk.     On the DEXA scan images from 11/5/2021, L2-L4 T score was -5.3, left femoral neck T score was -5.1, total left femur T score -6, 33% distal radius T score -3.5.    The patient received 2 dosages of Reclast, on 2/24/2022 and 3/24/23.   Recent calcium levels have been normal.  GFR in the 80s to 90s.  Most recent PTH was 43, in January 2023, when vitamin D level was 119.  Following this lab work, the high-dose of 5000 units vitamin D was discontinued.    3. Adrenal nodularity, incidentally noticed on the abdominal CT images from June 2021, small and bilateral.     Pertinent labs reviewed:  1/6/24 cortisol 12.2 at 9:38 am   11/5/21   ACTH<10, cortisol 7.9  2/4/22 salivary cortisol 0.624 (0.022-0.254) - contaminated by blood   12/21 negative tissue transglutaminase antibodies, 1 mg dexamethasone suppression test revealed a cortisol level 2.2, with an appropriate dexamethasone level.    2011 cortisol 23.2     RA  Systemic symptoms: fatigue; weight stable. Appetite is good.   Longstanding dry eyes - uses eye drops   Otolaryngeal symptoms: some dysphagia - intermittent and longstanding   Cardiovascular symptoms: no CP or palpitations   Pulmonary symptoms: No pulmonary symptoms.  Gastrointestinal symptoms: constipation relived by miralax   Genitourinary symptoms: No genitourinary symptoms.  Endocrine symptoms: longstanding cold intolerance  Hematologic symptoms: No hematologic symptoms.  Musculoskeletal symptoms: occasional hip pain with movement     Skin symptoms: dry skin, hair loss; no easy bruising noted; no stretch marks, no hirsutism or acne    Past Medical History  Past Medical History:   Diagnosis Date    Gastro-oesophageal reflux disease     Multiple sclerosis (H) 1990s   May 2020, diagnosed with Covid  Depression  Pancytopenia dating back to at least 2014  Hyperlipidemia  Lung nodules  Candida esophagitis  UTI  R intertrochanteric femoral fracture  2009   Left tibial and fibular fracture in 2013  Left tibial plateau fracture June 2020   Left femoral fracture in 10/2018, after falling from standing height (above the knee)    Medications    Current Outpatient Medications:     acetaminophen (TYLENOL) 500 MG tablet, Take 1-2 tablets (500-1,000 mg) by mouth every 8 hours as needed for mild pain or fever (greater than 101 degrees), Disp: , Rfl:     acyclovir (ZOVIRAX) 400 MG tablet, Take 1 tablet (400 mg) by mouth every 12 hours, Disp: 180 tablet, Rfl: 3    Armodafinil 200 MG TABS, Take 200 mg by mouth every morning, Disp: , Rfl:     baclofen (LIORESAL) 10 MG tablet, Take 10-30 mg by mouth 2 times daily Take 2 tablets (20 MG) by mouth every morning, 1 tablet (10 MG) by mouth daily in the afternoon as needed, and 3 tablets (30 MG) by mouth every evening before bedtime., Disp: , Rfl:     calcium carbonate 500 mg, elemental, (OSCAL 500) 1250 (500 Ca) MG TABS tablet, Take 2 tablets by mouth at bedtime, Disp: , Rfl:     DULoxetine (CYMBALTA) 20 MG capsule, Take 1 capsule (20 mg) by mouth daily Take along with 60 mg for a total of 80 mg, Disp: 90 capsule, Rfl: 3    famotidine (PEPCID) 40 MG tablet, Take 1 tablet (40 mg) by mouth At Bedtime, Disp: 90 tablet, Rfl: 3    gabapentin (NEURONTIN) 300 MG capsule, Take 2 capsules (600 mg) by mouth 4 times daily, Disp: , Rfl:     latanoprost (XALATAN) 0.005 % ophthalmic solution, INSTILL 1 DROP INTO BOTH EYES EVERY DAY AS DIRECTED PT WILL NEED TO BE SEEN FOR FUTURE REFILLS, Disp: , Rfl:     levothyroxine (SYNTHROID/LEVOTHROID) 100 MCG tablet, TAKE 1 TABLET BY MOUTH SIX TIMES A WEEK (Patient taking differently: Does not take on Sunday), Disp: 30 tablet, Rfl: 0    multivitamin w/minerals (THERA-VIT-M) tablet, Take 1 tablet by mouth every evening, Disp: , Rfl:     NITROFURANTOIN PO, , Disp: , Rfl:     ondansetron (ZOFRAN ODT) 4 MG ODT tab, Take 1 tablet (4 mg) by mouth every 8 hours as needed for nausea, Disp: 20 tablet, Rfl: 0     oxyCODONE (ROXICODONE) 5 MG tablet, Take 0.5 tablets (2.5 mg) by mouth every 6 hours as needed for moderate pain, Disp: 15 tablet, Rfl: 0    polyethylene glycol (MIRALAX) 17 GM/Dose powder, Take 17 g by mouth daily, Disp: 510 g, Rfl: 1    Allergies  Allergies   Allergen Reactions    Bactrim [Sulfamethoxazole-Trimethoprim] Hallucination    Sulfamethizole     Amantadine      hallucinations    Budesonide     Budesonide-Formoterol Fumarate Rash     Family History  family history includes Cancer in her maternal grandfather; Heart Disease in her father, maternal grandmother, mother, and paternal grandfather.  She has positive family of autoimmune disease. Her mom has lupus and hypopituitarism. No family history of thyroid disease.  Maternal grandfather had throat cancer. Type 1 diabetes - maternal grandmother. Diabetes - maternal aunt.     Social History  , 2 children.  She denies drinking alcohol or using illicit drugs.  History of long-term smoking.  Social History     Tobacco Use    Smoking status: Every Day     Packs/day: .25     Types: Cigarettes     Last attempt to quit: 2022     Years since quittin.0    Smokeless tobacco: Never   Vaping Use    Vaping Use: Never used   Substance Use Topics    Alcohol use: Yes     Alcohol/week: 0.0 standard drinks of alcohol     Comment: occasional     Drug use: No      Physical Exam     Wt Readings from Last 10 Encounters:   24 58.7 kg (129 lb 6.6 oz)   23 62.6 kg (138 lb)   23 62.6 kg (138 lb 0.1 oz)   23 66.1 kg (145 lb 11.2 oz)   23 67.6 kg (149 lb)   22 59 kg (130 lb)   22 59 kg (130 lb)   22 59 kg (130 lb)   21 59 kg (130 lb)   21 59 kg (130 lb)     BP Readings from Last 6 Encounters:   24 116/53   24 112/60   24 121/73   24 132/78   23 129/76   23 112/53     BP (!) 160/83   Pulse 76   LMP  (LMP Unknown)   SpO2 99%   There is no height or weight on file to calculate  BMI.     General appearance: Pleasant, lying in an electric scooter   Eyes: conjunctivae and extraocular motions are normal. Pupils are equal, round, and reactive to light. No lid lag, no stare.  HENT: neck no JVD, no bruits, no thyromegaly, no palpable nodules  Cardiovascular: regular rhythm, no murmurs, distal pulses palpable, bilateral lower extremities edema, left foot boot  Respiratory: chest clear, no rales, no rhonchi  Neurologic: No resting tremor; upper left extremity paresis   Psychiatric: affect and judgment normal   Skin: No abdominal striae, no hirsutism or acne    DATA REVIEW    TSH   Date Value Ref Range Status   01/05/2024 52.60 (H) 0.30 - 4.20 uIU/mL Final   11/05/2021 1.33 0.40 - 4.00 mU/L Final   06/30/2021 191.68 (H) 0.40 - 4.00 mU/L Final     T4 Total   Date Value Ref Range Status   02/28/2011 7.6 4.7 - 13.3 ug/dl Final     T4 Free   Date Value Ref Range Status   06/30/2021 0.67 (L) 0.76 - 1.46 ng/dL Final     Free T4   Date Value Ref Range Status   01/05/2024 1.09 0.90 - 1.70 ng/dL Final     45 minutes spent on the date of the encounter doing chart review, history and exam, documentation and further activities as noted above.      Neli Bean MD

## 2024-01-29 NOTE — PATIENT INSTRUCTIONS
Take 8 mg dexamethasone at 11 pm and have a cortisol level checked the following day at 8 am     Please call and schedule at one of these locations that provide the service you need.     DEXA, CT, MRI, US, XRAY    [facility]   ([facility] [place]) [phone]   [facility] ([place]) [phone]   [facility] ([facility]) [phone]   [facility] ([facility]) [phone]       Welcome to the [facility] Endocrinology and Diabetes Clinics     Our Endocrinology Clinics are here to provide you with a team-based, collaborative approach in the diagnosis and treatment of patients with diabetes and endocrine disorders. The team is made up of Physicians, Physician Assistants, Certified Diabetes Educators, Registered Nurses, Medical Assistants, Emergency Medical Technicians, and many others, all of whom have the unified goal of providing our patients with high quality care.     Please see below for some helpful tips to best navigate and use the [facility] Endocrinology clinic:     [place] Respect: At [facility], we are committed to a respectful and safe space for all patients, visitors, and staff.  We believe that mutual respect between patients and their care team is the foundation of quality care.  It is our expectation that you will be treated with respect by your care team.  In turn, we ask that all communication with the care team (written and verbal) be respectful and free from profanity, threatening, or abusive language.  Disrespectful communication undermines our therapeutic relationship with you and may result in us being unable to continue to provide your care.    Refills: A provider must see you at least annually to prescribe and refill medications. This is to ensure your safety as well as meet insurance and compliance regulations.    Scheduling: Many of our Providers offer both in-person or video visits. Please call to schedule any needed  follow ups as soon as possible because our provider schedules fill up very quickly. Our care team has the right to require an in-person visit when they believe that it is medically necessary. Please remember that for any virtual visits, you must be in the state of [place] at the time of the visit, otherwise we are unable to see you and you will need to be rescheduled.    Missed Appointments: If you need to cancel or miss your scheduled appointment, please call the clinic at [phone] to reschedule.  Please note if you repeatedly miss appointments or repeatedly miss appointments without calling to inform us ahead of time (no-show), the clinic may elect to not allow you to reschedule without speaking to a manager, may require a Partnership In Care Agreement prior to rescheduling, or could result in you no longer being able to receive care from the clinic. Providing the clinic with timely notification if you have to miss an appointment, allows us to better serve the needs of all of our patients.    Primary Care Provider: Our Endocrinologists are Specialists in their field. We expect you to have a Primary Care Provider established to handle any needs outside of your diabetes and endocrine care.  We would be happy to assist you find a Primary Care Provider, if you do not have one.    Tvoop: Tvoop is a wonderful resource that allows you access to your Care Team via online or the rama. Please ask a member of the team if you would like help creating an account. Please note that it may take up to 2 business days for a response. Tvoop messages are not reviewed on weekends or after business hours.  Emergent or urgent care needs should never be communicated via Tvoop.  If you experience a medical emergency call 911 or go to the nearest emergency room.    Labs: It is recommended that you stay within the [facility] for labs but you are welcome to obtain ordered labs (with some exceptions) from any location  of your choice as long as they are able to complete and process the needed labs. If you need us to fax orders to your preferred lab, please provide us the name and fax number of the lab you would like to go to so we can fax the orders. If your labs are drawn outside of the [facility], please have them fax the results to [phone] ([place]) or [phone] ([place]) or via [facility]. It is your responsibility to ensure that outside lab results are sent to us.    We look forward to working with you. Please do not hesitate to reach out with any questions.    Thank you,    The Endocrine Team    [facility] Address:   [place] Address:     [address] [place] [address]    Phone: [phone]  Fax: [phone]   [address] [place] [address]    Phone: [phone]  Fax: [phone]     [facility] Cost Estimate Phone Number: [phone]    General Lab and Imaging Scheduling Phone Number: [phone]

## 2024-01-30 LAB
DEPRECATED CALCIDIOL+CALCIFEROL SERPL-MC: <80 UG/L (ref 20–75)
MAYO MISC RESULT: NORMAL
VITAMIN D2 SERPL-MCNC: <5 UG/L
VITAMIN D3 SERPL-MCNC: 75 UG/L

## 2024-01-31 ENCOUNTER — TELEPHONE (OUTPATIENT)
Dept: FAMILY MEDICINE | Facility: CLINIC | Age: 69
End: 2024-01-31

## 2024-01-31 ENCOUNTER — OFFICE VISIT (OUTPATIENT)
Dept: FAMILY MEDICINE | Facility: CLINIC | Age: 69
End: 2024-01-31
Payer: MEDICARE

## 2024-01-31 ENCOUNTER — MEDICAL CORRESPONDENCE (OUTPATIENT)
Dept: HEALTH INFORMATION MANAGEMENT | Facility: CLINIC | Age: 69
End: 2024-01-31

## 2024-01-31 VITALS
HEART RATE: 89 BPM | WEIGHT: 153 LBS | SYSTOLIC BLOOD PRESSURE: 132 MMHG | OXYGEN SATURATION: 100 % | DIASTOLIC BLOOD PRESSURE: 70 MMHG | TEMPERATURE: 98.5 F | BODY MASS INDEX: 23.26 KG/M2

## 2024-01-31 DIAGNOSIS — E87.1 HYPONATREMIA: ICD-10-CM

## 2024-01-31 DIAGNOSIS — G35 MS (MULTIPLE SCLEROSIS) (H): ICD-10-CM

## 2024-01-31 DIAGNOSIS — F33.42 RECURRENT MAJOR DEPRESSIVE DISORDER, IN FULL REMISSION (H): ICD-10-CM

## 2024-01-31 DIAGNOSIS — M81.0 OSTEOPOROSIS OF MULTIPLE SITES: ICD-10-CM

## 2024-01-31 DIAGNOSIS — Z09 HOSPITAL DISCHARGE FOLLOW-UP: Primary | ICD-10-CM

## 2024-01-31 DIAGNOSIS — G47.39 TREATMENT-EMERGENT CENTRAL SLEEP APNEA: ICD-10-CM

## 2024-01-31 DIAGNOSIS — I10 HYPERTENSION GOAL BP (BLOOD PRESSURE) < 140/90: ICD-10-CM

## 2024-01-31 DIAGNOSIS — D63.8 ANEMIA OF CHRONIC DISEASE: ICD-10-CM

## 2024-01-31 DIAGNOSIS — M79.89 RIGHT LEG SWELLING: ICD-10-CM

## 2024-01-31 PROBLEM — E89.0 POSTABLATIVE HYPOTHYROIDISM: Status: ACTIVE | Noted: 2021-08-16

## 2024-01-31 PROBLEM — R26.0 ATAXIC GAIT: Status: ACTIVE | Noted: 2024-01-31

## 2024-01-31 PROBLEM — E04.2 MULTIPLE THYROID NODULES: Status: ACTIVE | Noted: 2021-08-16

## 2024-01-31 PROBLEM — B37.81 CANDIDA ESOPHAGITIS (H): Status: ACTIVE | Noted: 2019-05-04

## 2024-01-31 PROBLEM — B37.81 CANDIDA ESOPHAGITIS (H): Status: RESOLVED | Noted: 2019-05-04 | Resolved: 2024-01-31

## 2024-01-31 PROBLEM — H46.9 OPTIC NEUROPATHY: Status: ACTIVE | Noted: 2024-01-31

## 2024-01-31 PROBLEM — K59.09 OTHER CONSTIPATION: Status: ACTIVE | Noted: 2021-06-30

## 2024-01-31 PROBLEM — N30.00 ACUTE CYSTITIS WITHOUT HEMATURIA: Status: RESOLVED | Noted: 2021-07-02 | Resolved: 2024-01-31

## 2024-01-31 PROBLEM — T14.8XXA CONTUSION OF BONE: Status: RESOLVED | Noted: 2023-11-17 | Resolved: 2024-01-31

## 2024-01-31 PROBLEM — N60.92 ATYPICAL DUCTAL HYPERPLASIA OF LEFT BREAST: Status: RESOLVED | Noted: 2022-03-17 | Resolved: 2024-01-31

## 2024-01-31 PROBLEM — R91.8 LUNG NODULES: Status: RESOLVED | Noted: 2017-08-23 | Resolved: 2024-01-31

## 2024-01-31 PROBLEM — M25.561 ACUTE PAIN OF RIGHT KNEE: Status: RESOLVED | Noted: 2023-11-17 | Resolved: 2024-01-31

## 2024-01-31 PROBLEM — Z87.891 FORMER TOBACCO USE: Status: RESOLVED | Noted: 2019-05-04 | Resolved: 2024-01-31

## 2024-01-31 PROBLEM — N18.30 CKD (CHRONIC KIDNEY DISEASE) STAGE 3, GFR 30-59 ML/MIN (H): Status: RESOLVED | Noted: 2020-03-11 | Resolved: 2024-01-31

## 2024-01-31 PROBLEM — N31.9 NEUROGENIC BLADDER: Status: ACTIVE | Noted: 2024-01-31

## 2024-01-31 PROBLEM — D05.12 DUCTAL CARCINOMA IN SITU (DCIS) OF LEFT BREAST: Status: ACTIVE | Noted: 2022-04-14

## 2024-01-31 PROBLEM — S72.402A FRACTURE OF FEMUR, DISTAL, LEFT, CLOSED (H): Status: RESOLVED | Noted: 2018-11-01 | Resolved: 2024-01-31

## 2024-01-31 PROBLEM — E05.00 GRAVES DISEASE: Status: RESOLVED | Noted: 2021-06-10 | Resolved: 2024-01-31

## 2024-01-31 PROBLEM — R25.2 SPASTICITY: Status: ACTIVE | Noted: 2024-01-31

## 2024-01-31 PROBLEM — J18.9 COMMUNITY ACQUIRED PNEUMONIA, UNSPECIFIED LATERALITY: Status: RESOLVED | Noted: 2019-05-04 | Resolved: 2024-01-31

## 2024-01-31 PROCEDURE — 99495 TRANSJ CARE MGMT MOD F2F 14D: CPT | Performed by: FAMILY MEDICINE

## 2024-01-31 RX ORDER — LOSARTAN POTASSIUM 25 MG/1
25 TABLET ORAL DAILY
Qty: 90 TABLET | Refills: 3 | Status: SHIPPED | OUTPATIENT
Start: 2024-01-31 | End: 2024-02-21

## 2024-01-31 RX ORDER — UBLITUXIMAB 150 MG/6ML
INJECTION, SOLUTION, CONCENTRATE INTRAVENOUS
COMMUNITY
Start: 2023-10-24 | End: 2024-06-11

## 2024-01-31 NOTE — PROGRESS NOTES
Assessment & Plan     Marylee was seen today for hospital f/u, edema, recheck medication, derm problem and home care/hospice.    Diagnoses and all orders for this visit:    Hospital discharge follow-up  Comments:  Reviewed recent ortho and endo visits and follow ups.  Will ask home RN to do 8 am blood draw for Dr. Valera's dexamethasone challenge order    Right leg swelling  Comments:  Likely from removing cast and increased mobility; but will order US to rule out blood clot.  Orders:  -     US Lower Extremity Venous Duplex Right; Future    Hypertension goal BP (blood pressure) < 140/90  Comments:  Can no longer be on hctz; hyponatremia  Plan: switch to losartan.  Recheck at visit next month  Orders:  -     losartan (COZAAR) 25 MG tablet; Take 1 tablet (25 mg) by mouth daily    Treatment-emergent central sleep apnea and Hypersomnia - followed by Dr Say Lopez 2024  Comments:  2024: reviewed last sleep note. using EPAP rather than CPAP. Started Nuvigil for daytime sleepiness    MS (multiple sclerosis) (H) - dx'd in 1993, followed by Dr. Redman [facility]  Comments:  Reviewed last note, appears to be from 11/2022  They will see him yearly; schedule appt    Osteoporosis, severe  Comments:  Reviewed last endocrinology note; wll do Evenity    Anemia of chronic disease  Comments:  Stable, last hgb 10.  Thought to be due to chronic disease and MS medications    Recurrent major depressive disorder, in full remission (H24)  Comments:  Stable on duloxetine.    Hyponatremia  Comments:  Resolved, will stay off hctz and switch to losartan    Other orders  -     REVIEW OF HEALTH MAINTENANCE PROTOCOL ORDERS  -     Cancel: Referral to Acute and Diagnostic Services (Day of diagnostic / First order acute); Future          I spent a total of 84 minutes on the day of the visit.   Time spent by me doing chart review, history and exam, documentation and further activities per the  note        MED REC REQUIRED  Post Medication Reconciliation Status:  Discharge medications reconciled and changed, see notes/orders    Follow up - with me for physical in [facility]    Subjective Marylee is a 68 year old, presenting for the following health issues:  Hospital F/U (11/16/23-11/30/23 & 1/5/24-1/18/24 Right distal femur Fracture; low sodium, blood pressure was causing low sodium needs change of meds), Edema (Both legs-lymphedema socks- do help), Recheck Medication, Derm Problem (Pressure sores- ), and Home Care/Hospice (Hoping to have a home nurse come out in the mornings for her blood draws)        1/31/2024     9:12 AM   Additional Questions   Roomed by [facility]   Accompanied by Spouse- Manuel         1/31/2024     9:12 AM   Patient Reported Additional Medications   Patient reports taking the following new medications Cymbalta 60mg     HPI       Hospital Follow-up Visit:    Hospital/Nursing Home/IP Rehab Facility: [facility]  Date of Admission: 11/16/23 & 1/5/24  Date of Discharge: 11/30/23 & 1/18/24  Reason(s) for Admission: Right distal femur fracture, low sodium    Was your hospitalization related to COVID-19? No   Problems taking medications regularly:  None  Medication changes since discharge: stopped blood pressure medication; and stopped oxybutin  Problems adhering to non-medication therapy:  None    Summary of hospitalization:  [facility] discharge summary reviewed  Diagnostic Tests/Treatments reviewed.  Follow up needed: yes  Other Healthcare Providers Involved in Patient s Care:         Homecare and Specialist appointment -    Update since discharge: improved.       We need to switch hydrochlorothiazide as caused hyponatremia.  She needs blood draw at home on 8 am.  Saw ortho last week; he said she would continue healing.  But has swelling in right leg.  Since cast off - swelling in leg.      Plan of care communicated with patient  and family                 Objective    /70   Pulse 89   Temp 98.5  F (36.9  C) (Tympanic)   Wt 69.4 kg (153 lb)   LMP 01/31/2004 (Approximate)   SpO2 100%   Breastfeeding No   BMI 23.26 kg/m    Body mass index is 23.26 kg/m .  Physical Exam   GENERAL: alert and no distress  MS: wheelchair bound.  Right leg with very swollen right knee, tenderness, and swelling right hip and inner thigh.              Signed Electronically by: Carolina Pulliam MD

## 2024-01-31 NOTE — TELEPHONE ENCOUNTER
Writer called and left message on [facility] [facility] to call back and speak with a triage nurse regarding patient and need for a blood draw on Friday @ 8 am.     Contacted Dr. Pulliam regarding clarification questions:   -- this Friday blood draw  -- ongoing blood draws  -- lab order needs to be placed    TC will fax orders. Need FAX number from nurse on return call.     Dr. Pulliam:  One time lab draw, the orders were placed by her endocrinologist, and ideally it would be this Friday but could be another day, we'd just have to let pt know when as she has to take a medication at 11 pm the night before the 8 am lab draw.    Noy Cristobal, SAEIDN RN  [facility]

## 2024-01-31 NOTE — TELEPHONE ENCOUNTER
Writer called and left message on Maranda's voicemail to call back and speak with a triage nurse regarding patient and need for a blood draw on Friday @ 8 am.     Contacted Dr. Pulliam regarding clarification questions:   -- this Friday blood draw  -- ongoing blood draws  -- lab order needs to be placed    TC will fax orders. Need FAX number from nurse on return call.     Dr. Pulliam:  One time lab draw, the orders were placed by her endocrinologist, and ideally it would be this Friday but could be another day, we'd just have to let pt know when as she has to take a medication at 11 pm the night before the 8 am lab draw.    Maranda with [facility] @ [phone]     Noy Cristobal, SAEIDN RN  [facility]

## 2024-01-31 NOTE — PROGRESS NOTES
Message left on [facility] voicemail to call back and speak to a triage nurse.   Go to 1/23/2024 telephone encounter.     Noy Cristobal, SAEIDN RN  [facility]

## 2024-02-01 ENCOUNTER — TELEPHONE (OUTPATIENT)
Dept: FAMILY MEDICINE | Facility: CLINIC | Age: 69
End: 2024-02-01
Payer: MEDICARE

## 2024-02-01 ENCOUNTER — ANCILLARY PROCEDURE (OUTPATIENT)
Dept: ULTRASOUND IMAGING | Facility: CLINIC | Age: 69
End: 2024-02-01
Attending: FAMILY MEDICINE
Payer: MEDICARE

## 2024-02-01 DIAGNOSIS — M79.89 RIGHT LEG SWELLING: ICD-10-CM

## 2024-02-01 PROCEDURE — 93971 EXTREMITY STUDY: CPT | Mod: RT

## 2024-02-01 NOTE — TELEPHONE ENCOUNTER
Ambar, PT, [facility]:  Got an order to see patient from hospital provider   Saw patient today and started PT assessment but did not know patient had an order to do an US bilaterally to rule out DVTs  Patient left during home PT exam to get US completed  Need verbal order for 1 more session next week to complete initial PT evaluation    Writer gave verbal authorization for requested home care order.    SAEID LowN, RN-[facility]

## 2024-02-01 NOTE — TELEPHONE ENCOUNTER
Writer called Endocrinology clinic for Dr. Neli Bean.   Writer spoke with Alda, , who pass message on.     Order for home care lab draw.   Include directions for medication (dexamethasone. 2 tablets) to be taken at 11 pm the night before blood draw.   Blood draw to occur at 8 am the following day after taking the dexamethasone.   FAX orders to [facility] @ [phone]   Call Pau MOODY ([phone] ) from [facility] to let her know orders are faxed.    Thank you,   Noy Cristobal, BSN RN  [facility]

## 2024-02-01 NOTE — TELEPHONE ENCOUNTER
Pau, RN with [facility] returned call and writer relayed below message and Pau says;      -They have not received any recent orders  -Orders need to be faxed to them at [phone]  -Each order must have diagnosis and signature      Writer does see TSH and T4 Free active ordered by a Neli Bean, unsure if this is endo provider-      Pau's callback is [phone]

## 2024-02-02 ENCOUNTER — TELEPHONE (OUTPATIENT)
Dept: FAMILY MEDICINE | Facility: CLINIC | Age: 69
End: 2024-02-02
Payer: MEDICARE

## 2024-02-02 NOTE — TELEPHONE ENCOUNTER
General Call    Contacts         Type Contact Phone/Fax    02/02/2024 02:31 PM CST Phone (Incoming) Armida, Home Care Nurse [phone]     Health system    Ok to leave a detailed VM.          Reason for Call:  Clarification on lab orders    What are your questions or concerns:      Armida from [facility] is calling to get clarification on lab orders that was faxed and received from them this morning.     1) Are they just drawing the Cortisol level?   (They just want to ensure there is no other labs they have to do).    2) Which doctor are they sending these labs to?   (Dr. Neli Bean or Dr. Carolina Pulliam?)    3) In the received lab order there was something about the [facility]? Can they drop off the drawn blood at any [facility]?    Date of last appointment with provider: 1/31/2024    Writer will route message to both Dr. Pulliam and Dr. Bean to review and advise.    SAEID TorresN, RN   [facility]

## 2024-02-02 NOTE — RESULT ENCOUNTER NOTE
Hi Marylee!    The vitamin D level is elevated.  You have had this issues for a few years.  According to my notes, you are currently taking vitamin D only from the calcium supplements and the multivitamins.  Would you please confirm for me what is the total daily dose you have been taking?  The lab has released my order for cortisol, despite you not taking dexamethasone prior.  So we would have to repeat this test.  My nurse is going to contact you to determine if the blood can be drawn by your home nurse.  The parathyroid hormone level is a little elevated and I would recommend to continue to monitor it for now. Given this finding, I do not think treatment with Evenity would be indicated. I will contact you with the treatment plan for osteoporosis once I get the DXA scan results.

## 2024-02-03 NOTE — TELEPHONE ENCOUNTER
Yes to cortisol level    2. Send to Dr. Bean    3. I don't know - it's Dr. Bean's order.    Dr. Carolina Pulliam MD / [facility]

## 2024-02-05 DIAGNOSIS — E89.0 POSTABLATIVE HYPOTHYROIDISM: ICD-10-CM

## 2024-02-05 NOTE — TELEPHONE ENCOUNTER
Writer called Armida and relayed message per Dr. Pulliam.    Armida verbalized understanding and in agreement with plan.    SAEID LowN, RN-BC  [facility]

## 2024-02-06 ENCOUNTER — TELEPHONE (OUTPATIENT)
Dept: FAMILY MEDICINE | Facility: CLINIC | Age: 69
End: 2024-02-06
Payer: MEDICARE

## 2024-02-06 DIAGNOSIS — R79.89 ELEVATED CORTISOL LEVEL: ICD-10-CM

## 2024-02-06 RX ORDER — LEVOTHYROXINE SODIUM 100 UG/1
TABLET ORAL
Qty: 30 TABLET | Refills: 0 | OUTPATIENT
Start: 2024-02-06

## 2024-02-06 RX ORDER — DEXAMETHASONE 4 MG/1
TABLET ORAL
Qty: 2 TABLET | Refills: 0 | Status: SHIPPED | OUTPATIENT
Start: 2024-02-06 | End: 2024-04-03

## 2024-02-06 NOTE — TELEPHONE ENCOUNTER
Dr. Pulliam,    [facility] is requesting continuation of PT. Please see notes below and advise.       The Home Care/Assisted Living/Nursing Facility is calling regarding an established patient.  Has the patient seen Home Care in the past or is currently residing in Assisted Living or Nursing Facility? Yes.     Ambar calling from [facility] requesting the following orders that are within the Home Care, Assisted Living or Nursing Home Eval and Treatment standing order and can be signed as standing order signature required by RN.    Preferred Call Back Number: [phone]    PT/OT/Speech Therapy    Any additional Orders:  Are there any orders requested, not stated above, that are outside of the standing order and must be routed to a licensed practitioner for approval?    Yes: PT extension orders as of 2/10/2024  PT 2 times per wk x 4 wks then 1 times per wk for 2 wks. This is a request for continuation of service.    Writer has verified Requestor will send fax to have orders signed.     Destiney LOUIS RN  [facility]

## 2024-02-06 NOTE — TELEPHONE ENCOUNTER
Dr Pulliam,    Wants refill for oxycodone due to pain from the physical therapy  Last rx was from the hospital. She was in hospital for low sodium  Feels pain in her hip and knee - hx of femur rx    Are you able to do e visit for this or What do you advise?    She has appt on 2/14 with you    Mabel Bowman RN

## 2024-02-07 NOTE — TELEPHONE ENCOUNTER
Patient appt 2/14 was cancelled for some reason. Rescheduled for telephone visit 2/14 at 1730 to discuss ongoing pain management. Patient states that right now she is taking 2 tylenol morning and evening with adequate pain relief, but needs additional pain relief after PT or increased activity.    SAEID CarnesN, RN  [facility]

## 2024-02-07 NOTE — TELEPHONE ENCOUNTER
I don't recommend we continue oxycodone as she is 8 wks out from her fracture.    I guess yes, we need another visit to discuss her pain and management plan.  Probably not an eVisit - but could be phone or video.    Dr. Carolina Pulliam MD / [facility]

## 2024-02-08 ENCOUNTER — MYC MEDICAL ADVICE (OUTPATIENT)
Dept: ENDOCRINOLOGY | Facility: CLINIC | Age: 69
End: 2024-02-08

## 2024-02-09 ENCOUNTER — TELEPHONE (OUTPATIENT)
Dept: ENDOCRINOLOGY | Facility: CLINIC | Age: 69
End: 2024-02-09
Payer: MEDICARE

## 2024-02-09 PROCEDURE — 82533 TOTAL CORTISOL: CPT | Mod: ORL | Performed by: INTERNAL MEDICINE

## 2024-02-09 NOTE — TELEPHONE ENCOUNTER
Spoke w/ Pt's . Confirms understanding they were to have the lab drawn here at the [facility]. They will have their home care nurse drop the specimen at [facility] lab within 2 hours, as they believe this is the instruction.   Lisa Huber RN on 2/9/2024 at 9:30 AM

## 2024-02-14 ENCOUNTER — VIRTUAL VISIT (OUTPATIENT)
Dept: FAMILY MEDICINE | Facility: CLINIC | Age: 69
End: 2024-02-14
Payer: MEDICARE

## 2024-02-14 DIAGNOSIS — M79.661 PAIN OF RIGHT LOWER LEG: Primary | ICD-10-CM

## 2024-02-14 DIAGNOSIS — Z87.81 HX OF FRACTURE OF FEMUR: ICD-10-CM

## 2024-02-14 DIAGNOSIS — E89.0 POSTABLATIVE HYPOTHYROIDISM: ICD-10-CM

## 2024-02-14 DIAGNOSIS — R30.0 DYSURIA: ICD-10-CM

## 2024-02-14 PROCEDURE — 99443 PR PHYSICIAN TELEPHONE EVALUATION 21-30 MIN: CPT | Mod: 93 | Performed by: FAMILY MEDICINE

## 2024-02-14 RX ORDER — LEVOTHYROXINE SODIUM 100 UG/1
100 TABLET ORAL DAILY
Qty: 90 TABLET | Refills: 4 | Status: ON HOLD | OUTPATIENT
Start: 2024-02-14 | End: 2024-04-09

## 2024-02-14 RX ORDER — OXYCODONE HYDROCHLORIDE 5 MG/1
2.5 TABLET ORAL EVERY 6 HOURS PRN
Qty: 15 TABLET | Refills: 0 | Status: SHIPPED | OUTPATIENT
Start: 2024-02-14 | End: 2024-09-24

## 2024-02-14 ASSESSMENT — PATIENT HEALTH QUESTIONNAIRE - PHQ9
SUM OF ALL RESPONSES TO PHQ QUESTIONS 1-9: 4
SUM OF ALL RESPONSES TO PHQ QUESTIONS 1-9: 4
10. IF YOU CHECKED OFF ANY PROBLEMS, HOW DIFFICULT HAVE THESE PROBLEMS MADE IT FOR YOU TO DO YOUR WORK, TAKE CARE OF THINGS AT HOME, OR GET ALONG WITH OTHER PEOPLE: NOT DIFFICULT AT ALL

## 2024-02-14 NOTE — PROGRESS NOTES
Marylee is a 68 year old who is being evaluated via a billable telephone visit.      What phone number would you like to be contacted at? [phone]   How would you like to obtain your AVS? Mail a copy    Distant Location (provider location):  On-site    Assessment & Plan     Pain of right leg  Other closed fracture of distal end of right femur, initial encounter (H)  Will refill #15 for now (has used #15 in 1 month) as using sparingly  I'm concerned there is something else going on with her leg that she still has this much pain - very atypical for her.  Sent message to her orthopedist Dr. Barros for 2nd look evaluation with him or one of his partners.  - oxyCODONE (ROXICODONE) 5 MG tablet; Take 0.5 tablets (2.5 mg) by mouth every 6 hours as needed for moderate pain    Nitrofurantoin:  Stopped during hospital stay in November 2023.  She's going to stay off it; see if has urinary tract infection.    Low grade fever.  Should she leave Urinalysis?    Levothyroxine.  Out of medicine - needs repeat blood testing.  She'll schedule lab only.    Blood pressure:  We changed med last visit - has been normal in AM, higher after pain or exercise.    The longitudinal plan of care for the condition(s) below were addressed during this visit. Due to the added complexity in care, I will continue to support Marylee in the subsequent management of this condition(s) and with the ongoing continuity of care of this condition(s).    Problem List Items Addressed This Visit as of 2/14/2024         Medium    Postablative hypothyroidism - which ensued post radioiodine ablation for Graves with 9.3 mCi I-131, on 2/9/2021.        Subjective   Marylee is a 68 year old, presenting for the following health issues:  Pain (Pain management)        2/14/2024     4:04 PM   Additional Questions   Roomed by Cortney RUVALCABA     Musculoskeletal Problem    History of Present Illness       Reason for visit:  Pain    She eats 2-3 servings of fruits and  vegetables daily.She consumes 0 sweetened beverage(s) daily.She exercises with enough effort to increase her heart rate 9 or less minutes per day.  She exercises with enough effort to increase her heart rate 3 or less days per week.   She is taking medications regularly.     Pain - can be pretty bad.  Usually at night is when pain is the worse.    After a long day - knee is sore, hip is sore.    Very uncomfortable.  By the time goes to bed at night in a lot of pain and will just cry because in so much pain.    Had first OT visit on Monday; was painful.  Putting brace on and off leg.  If going to [employer]; goes outside and goes to [employer]. Legs get bent a lot.  Very painful.    Just since the broken bones.  Used to not have this much pain.  Not sure why has this much pain.    Alternates Tylenol (acetaminophen) and ibuprofen; keeps it calmed down somewhat during the day.    Was given oxycodone in the hospital; taking it 2.5 mg just at night only if really bad.        Objective           Vitals:  No vitals were obtained today due to virtual visit.    Physical Exam   General: Alert and no distress //Respiratory: No audible wheeze, cough, or shortness of breath // Psychiatric:  Appropriate affect, tone, and pace of words            Phone call duration: 27 minutes  Signed Electronically by: Carolina Pulliam MD

## 2024-02-15 ENCOUNTER — LAB (OUTPATIENT)
Dept: LAB | Facility: CLINIC | Age: 69
End: 2024-02-15
Payer: MEDICARE

## 2024-02-15 DIAGNOSIS — R30.0 DYSURIA: ICD-10-CM

## 2024-02-15 LAB
ALBUMIN UR-MCNC: NEGATIVE MG/DL
APPEARANCE UR: CLEAR
BILIRUB UR QL STRIP: NEGATIVE
COLOR UR AUTO: YELLOW
GLUCOSE UR STRIP-MCNC: NEGATIVE MG/DL
HGB UR QL STRIP: NEGATIVE
KETONES UR STRIP-MCNC: NEGATIVE MG/DL
LEUKOCYTE ESTERASE UR QL STRIP: NEGATIVE
NITRATE UR QL: NEGATIVE
PH UR STRIP: 6 [PH] (ref 5–7)
SP GR UR STRIP: 1.01 (ref 1–1.03)
UROBILINOGEN UR STRIP-ACNC: 0.2 E.U./DL

## 2024-02-15 PROCEDURE — 81003 URINALYSIS AUTO W/O SCOPE: CPT

## 2024-02-16 ENCOUNTER — TELEPHONE (OUTPATIENT)
Dept: ORTHOPEDICS | Facility: CLINIC | Age: 69
End: 2024-02-16
Payer: MEDICARE

## 2024-02-16 NOTE — TELEPHONE ENCOUNTER
Writer called and left a voice message with provider name and date and time of appointment to discuss knee pain.     Maranda Sampson LPN

## 2024-02-16 NOTE — TELEPHONE ENCOUNTER
[facility] Call Center    Phone Message    May a detailed message be left on voicemail: yes     Reason for Call: Other: Per pt and spouse would like to ask if the lab that their home care nurse did , was it process? What are the results? Per pt would also like to ask about the new medication she sees. Please call pt or spouse back as they have some more questions. Please and thank you!     Action Taken: Message routed to:  [facility] ENDO    Travel Screening: Not Applicable

## 2024-02-16 NOTE — TELEPHONE ENCOUNTER
----- Message from Wendie Abdi RN sent at 2/16/2024  1:08 PM CST -----  Regarding: RE: Uncharacteristic Pain // Request from Dr. Maria Antonia Morgan,     I spoke with Arnaldo about this and this patient can be added on at 4:00pm on Monday.     Wendie Ramey RN   ----- Message -----  From: Luis Miguel Anguiano RN  Sent: 2/16/2024  12:14 PM CST  To: Arnaldo Kang PA-C; [facility]  Subject: Uncharacteristic Pain // Request from Dr. Lydia Morgan all,     Dr. Em sent a message to Rita, who sent a message to me regarding this pt. Apparently she was seen by her PCP who is concerned about chronic pain or R leg keeping pt up all night. Pt states to be crying in pain most nights (uncharacteristic per PCP). Dr. Em wondered if Arnaldo could see patient today or Monday for check up.    Thanks,    HEIDY Bolanos

## 2024-02-16 NOTE — TELEPHONE ENCOUNTER
My chart sent for normal cortisol level. I don't see any new medications added by Endocrine . Rochelle Ayala RN on 2/16/2024 at 12:00 PM

## 2024-02-19 ENCOUNTER — NURSE TRIAGE (OUTPATIENT)
Dept: FAMILY MEDICINE | Facility: CLINIC | Age: 69
End: 2024-02-19

## 2024-02-19 ENCOUNTER — ANCILLARY PROCEDURE (OUTPATIENT)
Dept: GENERAL RADIOLOGY | Facility: CLINIC | Age: 69
End: 2024-02-19
Attending: PHYSICIAN ASSISTANT
Payer: MEDICARE

## 2024-02-19 ENCOUNTER — OFFICE VISIT (OUTPATIENT)
Dept: ORTHOPEDICS | Facility: CLINIC | Age: 69
End: 2024-02-19
Payer: MEDICARE

## 2024-02-19 ENCOUNTER — TELEPHONE (OUTPATIENT)
Dept: FAMILY MEDICINE | Facility: CLINIC | Age: 69
End: 2024-02-19

## 2024-02-19 ENCOUNTER — TELEPHONE (OUTPATIENT)
Dept: ORTHOPEDICS | Facility: CLINIC | Age: 69
End: 2024-02-19

## 2024-02-19 DIAGNOSIS — M79.89 SWELLING OF CALF: ICD-10-CM

## 2024-02-19 DIAGNOSIS — S82.52XD CLOSED DISP FRACTURE OF LEFT MEDIAL MALLEOLUS WITH ROUTINE HEALING: ICD-10-CM

## 2024-02-19 DIAGNOSIS — M25.561 ACUTE PAIN OF RIGHT KNEE: ICD-10-CM

## 2024-02-19 DIAGNOSIS — S72.401D CLOSED FRACTURE OF DISTAL END OF RIGHT FEMUR WITH ROUTINE HEALING, UNSPECIFIED FRACTURE MORPHOLOGY, SUBSEQUENT ENCOUNTER: Primary | ICD-10-CM

## 2024-02-19 DIAGNOSIS — S72.401D CLOSED FRACTURE OF DISTAL END OF RIGHT FEMUR WITH ROUTINE HEALING, UNSPECIFIED FRACTURE MORPHOLOGY, SUBSEQUENT ENCOUNTER: ICD-10-CM

## 2024-02-19 DIAGNOSIS — M25.561 ACUTE PAIN OF RIGHT KNEE: Primary | ICD-10-CM

## 2024-02-19 PROCEDURE — 73502 X-RAY EXAM HIP UNI 2-3 VIEWS: CPT | Mod: RT | Performed by: RADIOLOGY

## 2024-02-19 PROCEDURE — 99214 OFFICE O/P EST MOD 30 MIN: CPT | Performed by: PHYSICIAN ASSISTANT

## 2024-02-19 PROCEDURE — 73560 X-RAY EXAM OF KNEE 1 OR 2: CPT | Mod: RT | Performed by: RADIOLOGY

## 2024-02-19 PROCEDURE — 73610 X-RAY EXAM OF ANKLE: CPT | Mod: LT | Performed by: RADIOLOGY

## 2024-02-19 NOTE — LETTER
2/19/2024         RE: Marylee Woods  [address]  [place] [facility] [place] [facility] [address]        Dear Colleague,    Thank you for referring your patient, Marylee Woods, to the [facility] [place]. Please see a copy of my visit note below.        [facility]  Orthopaedic Surgery Consultation by Jero Em M.D.    Marylee Woods MRN# [id number]   Age: 68 year old YOB: 1955     Requesting physician: No ref. provider found  Carolina Pulliam     Background history:  DX:  Multiple sclerosis wheelchair-bound  Graves' disease  CKD  Hypertension  OSAS  Depressive disorder  Osteoporosis  Tobacco use disorder    TREATMENTS:  2009, right intertrochanteric femur fracture for which DHS, Dr. Xie  2013, ORIF tibia fracture with a medullary nail, Dr. Cast  2014, laparoscopic cholecystectomy  2018, ORIF left distal femur fracture, Dr. 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     Patient was admitted after falls with a displaced, comminuted right distal femur fracture and left minimally displaced medial malleolus fracture. Patient has a significant amount of hardware from prior orthopedic injuries.  Osteopenic appearing bone.  Wheelchair-bound.  Given patient's lower demand lifestyle and prior hardware in the right femur, concern of surgical intervention is the creation of a stress riser or increasing risk for a new fracture along with the risks associated with surgery/ORIF.  Alternatively we discussed the options of nonsurgical treatment.  After discussing these options with the patient it was decided to proceed with nonsurgical treatment.  The right  lower extremity of patient was placed in a long leg cast . With regards to her medial malleolus fracture, again given lower demand will plan to treat in a CAM boot.     Today the patient presents approximately 3 months status post the above-mentioned procedure.  Over the last month she has had progressively increasing pain in her right hip, knee and left ankle.  The pain is worse with palpation and range of motion.  States the pain is so severe it keeps her up at night.  She was seen by her primary care physician who ordered an ultrasound of the right lower extremity which was negative for DVT.    Social:   Occupation:   Living situation: With   Hobbies / Sports:    Smoking:Yes  Alcohol: No  Illicit drug use: No         Physical Exam:     EXAMINATION pertinent findings:   PSYCH: Pleasant, healthy-appearing, alert, oriented x3, cooperative. Normal mood and affect.  VITAL SIGNS: Last menstrual period 01/31/2004, not currently breastfeeding.  Reviewed nursing intake notes.   There is no height or weight on file to calculate BMI.  RESP: non labored breathing   ABD: benign, soft, non-tender, no acute peritoneal findings  SKIN: grossly normal   LYMPHATIC: grossly normal, no adenopathy, no extremity edema  NEURO: grossly normal , no motor deficits  VASCULAR: satisfactory perfusion of all extremities   MUSCULOSKELETAL:   Alignment: Normal alignment right lower extremity.    Patient is seated in wheelchair.  Severe peripheral edema bilateral lower extremities.  Pain with palpation of the right distal femur and knee.  Severe swelling of the knee noted.  Pain with range of motion of the right knee.  Pain with varus valgus stress and manipulation of the knee.  Axial compression not painful.  Range of motion knee right flexion/extension 100/0/0.  Deep flexion is painful.    Pain with palpation of the medial and lateral malleolus of the left ankle.    Bilateral positive Homans' sign    Dorsalis pedis and posterior pulses  intact           Data:   All laboratory data reviewed  All imaging studies reviewed by me personally.    XR knee/femur right 2/19/2024:  My interpretation: Shortening of distal femur but adequate alignment in both AP and lateral imaging studies.  Appearance of progressive callus formation around fracture site.  No signs of secondary displacement.  Severely osteopenic bone.  No significant change in alignment or position noted from previous x-ray    XR ankle left 2/19/2024:  My interpretation: Minimally displaced medial malleolar fracture.  Stable changes from previous x-rays    X-ray pelvis lateral right hip 2/19/2024: Stable postoperative changes of ORIF of right hip with no signs of mechanical failure.  Severe osteopenia noted         Assessment and Plan:   Assessment:  68-year-old female presenting for follow-up after minimally displaced and shortened distal femur fracture for which nonsurgical treatment was initiated by means of long-leg cast.  Also following up after minimally displaced left medial malleolar fracture for which cam boot was advised.  Clinically and radiographically healing fractures.  Severe lymphedema noted today      Plan:  I extensively discussed the fracture pattern and treatment options with the patient and her spouse.  I explained the complexly of her pain given she has polyarthralgia with no significant changes on x-ray or recent injuries.  I most notable significant physical exam plan today was the severe lymphedema.  I explained that this can cause severe pain and I recommend she continue her lymphedema therapy which she has recently discontinued.  If she would like an updated order she will reach out to us.  She has had new onset of pain in the right knee, increased swelling and pain with palpation or manipulation of the knee I recommend we get a CT scan to evaluate the healing of the knee.  Given she is also having swelling of the left lower extremity an ultrasound was ordered today  to rule out DVT.  Reinforced the importance of working on her peripheral edema/lymphedema as this can be a source of pain.  If she continues to have pain once her lymphedema has resolved we can continue to rule out other sources of potential generators.  The patient and her  agree with the plan.  I will reach out to them with results of the above-mentioned studies.    Arnaldo Kang PA-C  Physician Assistant   Oncology and Adult Reconstructive Surgery  Dept Orthopaedic Surgery, [facility]    This note was created using dictation software and may contain errors.  Please contact the creator for any clarifications that are needed.

## 2024-02-19 NOTE — TELEPHONE ENCOUNTER
Dhara calling again. She took her BP and it is 180/99 P 83. She is in her home now  Severe uncontrolled pain in hip and ankle  Her appt is with ortho today  (See the other phone triage note of 2/19/2024)  THis separate issue of high BP will be dealt with by primary clinic     Message about pain and xray requests for today will still be going to ortho    Mabel Bowman RN, BSN  [facility]

## 2024-02-19 NOTE — TELEPHONE ENCOUNTER
Pt was contacted.    Her BP will be checked in Ortho today and I did ask she call us back with these readings  Dr Pulliam is out of office until Wednesday    If over 200 on the top or 120 on the bottom to go she was made aware to go to ED or if she develops chest pain or severe headaches.     She is taking metro mobility to her ortho appt. She has no ability for same day drive as that takes a 48 hour notice    Possibly we can give her virtual visit tomorrow with another provider .    Pt had to get off phone as her ride coming. She will call us back with BP readings later today    Mabel Bowman RN, BSN  [facility]

## 2024-02-19 NOTE — NURSING NOTE
Reason For Visit:   Chief Complaint   Patient presents with    RECHECK     Discuss right leg pain and swelling. Denies recent falls or trauma        68 year old  1955    Primary MD: Carolina Pulliam      [facility] 01/31/2004 (Approximate)     Pain Assessment  Patient Currently in Pain: Yes  0-10 Pain Scale: 6  Primary Pain Location: Leg (the whole right leg)      Colin Grijalva ATC

## 2024-02-19 NOTE — TELEPHONE ENCOUNTER
Dhara- Occupational Therapist calling from [facility]. She's currently working with pt in PT and is calling to see if we can add in images for pt's left Ankle and right hip at [facility] appt as pt has uncontrollable pain since her last PT appt with Dhara last week. Please call Dhara back at [phone] to discuss

## 2024-02-19 NOTE — TELEPHONE ENCOUNTER
"Reason for Disposition    Systolic BP >= 180 OR Diastolic >= 110    Additional Information    Negative: Symptom is main concern (e.g., headache, chest pain)    Negative: Low blood pressure is main concern    Negative: Systolic BP >= 160 OR Diastolic >= 100, and any cardiac (e.g., breathing difficulty, chest pain) or neurologic symptoms (e.g., new-onset blurred or double vision)    Negative: Pregnant 20 or more weeks (or postpartum < 6 weeks) with new hand or face swelling    Negative: Pregnant 20 or more weeks (or postpartum < 6 weeks) and Systolic BP >= 160 OR Diastolic >= 110    Negative: Patient sounds very sick or weak to the triager    Negative: Systolic BP >= 200 OR Diastolic >= 120 and having NO cardiac or neurologic symptoms    Negative: Pregnant 20 or more weeks (or postpartum < 6 weeks) with Systolic BP >= 140 OR Diastolic >= 90    Negative: Systolic BP >= 180 OR Diastolic >= 110, and missed most recent dose of blood pressure medication    Answer Assessment - Initial Assessment Questions  1. BLOOD PRESSURE: \"What is the blood pressure?\" \"Did you take at least two measurements 5 minutes apart?\"    177/96   180/99 P 83    2. ONSET: \"When did you take your blood pressure?\"      Just now  3. HOW: \"How did you take your blood pressure?\" (e.g., automatic home BP monitor, visiting nurse)      PT took it   4. HISTORY: \"Do you have a history of high blood pressure?\"      yes  5. MEDICINES: \"Are you taking any medicines for blood pressure?\" \"Have you missed any doses recently?\"      Was taking hydrochlorothiazide but when in hospital for low sodium they stopped it  Taking losartan and took it this morning  6. OTHER SYMPTOMS: \"Do you have any symptoms?\" (e.g., blurred vision, chest pain, difficulty breathing, headache, weakness)      In tears all morning due to leg pain, PT in home with pt now   No other sx but the leg pain    Protocols used: Blood Pressure - High-A-OH    "

## 2024-02-19 NOTE — TELEPHONE ENCOUNTER
Nurse Triage SBAR    Is this a 2nd Level Triage? YES, LICENSED PRACTITIONER REVIEW IS REQUIRED    Situation: Physical Therapist at home with pt today and checked her BP.   Second check 180/99 P 83    Pt has a lot of severe leg pain today and  going to Ortho appt this afternoon . Although they will check her BP they will not manage it    Background: had been on hydrochlorothiazide but was dc'd due to eletrolyte issue  Pt currently on losartan and did take her usual dose this morning.   Pt does have mobility issues, would be hard to get to clinic    Assessment: need direction from primary for BP control - perhaps just due to current pain but quite elevated    Protocol Recommended Disposition:   See in Office Today    Recommendation: what do you advise?      Routed to provider    Does the patient meet one of the following criteria for ADS visit consideration? 16+ years old, with an [facility] PCP     TIP  Providers, please consider if this condition is appropriate for management at one of our Acute and Diagnostic Services sites.     If patient is a good candidate, please use dotphrase <dot>triageresponse and select Refer to ADS to document.

## 2024-02-19 NOTE — PROGRESS NOTES
[facility]  Orthopaedic Surgery Consultation by Jero Em M.D.    Marylee Woods MRN# [id number]   Age: 68 year old YOB: 1955     Requesting physician: No ref. provider found  Carolina Pulliam     Background history:  DX:  Multiple sclerosis wheelchair-bound  Graves' disease  CKD  Hypertension  OSAS  Depressive disorder  Osteoporosis  Tobacco use disorder    TREATMENTS:  2009, right intertrochanteric femur fracture for which DHS, Dr. Xie  2013, ORIF tibia fracture with a medullary nail, Dr. Cast  2014, laparoscopic cholecystectomy  2018, ORIF left distal femur fracture, Dr. 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     Patient was admitted after falls with a displaced, comminuted right distal femur fracture and left minimally displaced medial malleolus fracture. Patient has a significant amount of hardware from prior orthopedic injuries.  Osteopenic appearing bone.  Wheelchair-bound.  Given patient's lower demand lifestyle and prior hardware in the right femur, concern of surgical intervention is the creation of a stress riser or increasing risk for a new fracture along with the risks associated with surgery/ORIF.  Alternatively we discussed the options of nonsurgical treatment.  After discussing these options with the patient it was decided to proceed with nonsurgical treatment.  The right lower extremity of patient was placed in a long leg cast . With regards to her medial malleolus fracture, again given lower demand will plan to treat in a CAM boot.     Today the patient presents approximately 3 months status post the above-mentioned procedure.  Over  the last month she has had progressively increasing pain in her right hip, knee and left ankle.  The pain is worse with palpation and range of motion.  States the pain is so severe it keeps her up at night.  She was seen by her primary care physician who ordered an ultrasound of the right lower extremity which was negative for DVT.    Social:   Occupation:   Living situation: With   Hobbies / Sports:    Smoking:Yes  Alcohol: No  Illicit drug use: No         Physical Exam:     EXAMINATION pertinent findings:   PSYCH: Pleasant, healthy-appearing, alert, oriented x3, cooperative. Normal mood and affect.  VITAL SIGNS: Last menstrual period 01/31/2004, not currently breastfeeding.  Reviewed nursing intake notes.   There is no height or weight on file to calculate BMI.  RESP: non labored breathing   ABD: benign, soft, non-tender, no acute peritoneal findings  SKIN: grossly normal   LYMPHATIC: grossly normal, no adenopathy, no extremity edema  NEURO: grossly normal , no motor deficits  VASCULAR: satisfactory perfusion of all extremities   MUSCULOSKELETAL:   Alignment: Normal alignment right lower extremity.    Patient is seated in wheelchair.  Severe peripheral edema bilateral lower extremities.  Pain with palpation of the right distal femur and knee.  Severe swelling of the knee noted.  Pain with range of motion of the right knee.  Pain with varus valgus stress and manipulation of the knee.  Axial compression not painful.  Range of motion knee right flexion/extension 100/0/0.  Deep flexion is painful.    Pain with palpation of the medial and lateral malleolus of the left ankle.    Bilateral positive Homans' sign    Dorsalis pedis and posterior pulses intact           Data:   All laboratory data reviewed  All imaging studies reviewed by me personally.    XR knee/femur right 2/19/2024:  My interpretation: Shortening of distal femur but adequate alignment in both AP and lateral imaging studies.  Appearance of  progressive callus formation around fracture site.  No signs of secondary displacement.  Severely osteopenic bone.  No significant change in alignment or position noted from previous x-ray    XR ankle left 2/19/2024:  My interpretation: Minimally displaced medial malleolar fracture.  Stable changes from previous x-rays    X-ray pelvis lateral right hip 2/19/2024: Stable postoperative changes of ORIF of right hip with no signs of mechanical failure.  Severe osteopenia noted         Assessment and Plan:   Assessment:  68-year-old female presenting for follow-up after minimally displaced and shortened distal femur fracture for which nonsurgical treatment was initiated by means of long-leg cast.  Also following up after minimally displaced left medial malleolar fracture for which cam boot was advised.  Clinically and radiographically healing fractures.  Severe lymphedema noted today      Plan:  I extensively discussed the fracture pattern and treatment options with the patient and her spouse.  I explained the complexly of her pain given she has polyarthralgia with no significant changes on x-ray or recent injuries.  I most notable significant physical exam plan today was the severe lymphedema.  I explained that this can cause severe pain and I recommend she continue her lymphedema therapy which she has recently discontinued.  If she would like an updated order she will reach out to us.  She has had new onset of pain in the right knee, increased swelling and pain with palpation or manipulation of the knee I recommend we get a CT scan to evaluate the healing of the knee.  Given she is also having swelling of the left lower extremity an ultrasound was ordered today to rule out DVT.  Reinforced the importance of working on her peripheral edema/lymphedema as this can be a source of pain.  If she continues to have pain once her lymphedema has resolved we can continue to rule out other sources of potential generators.  The  patient and her  agree with the plan.  I will reach out to them with results of the above-mentioned studies.    Arnaldo Kang PA-C  Physician Assistant   Oncology and Adult Reconstructive Surgery  Dept Orthopaedic Surgery, [facility]    This note was created using dictation software and may contain errors.  Please contact the creator for any clarifications that are needed.

## 2024-02-20 ENCOUNTER — TELEPHONE (OUTPATIENT)
Dept: FAMILY MEDICINE | Facility: CLINIC | Age: 69
End: 2024-02-20
Payer: MEDICARE

## 2024-02-20 LAB — RADIOLOGIST FLAGS: NORMAL

## 2024-02-20 NOTE — TELEPHONE ENCOUNTER
Forms/Letter Request    Type of form/letter: Home Health Certification      Do we have the form/letter: Yes: Placed in care team 4    Who is the form from? Home care    Where did/will the form come from? form was faxed in    When is form/letter needed by: as soon as able    How would you like the form/letter returned: Fax : [phone]

## 2024-02-21 ENCOUNTER — TELEPHONE (OUTPATIENT)
Dept: FAMILY MEDICINE | Facility: CLINIC | Age: 69
End: 2024-02-21

## 2024-02-21 DIAGNOSIS — L89.222 PRESSURE INJURY OF LEFT HIP, STAGE 2 (H): ICD-10-CM

## 2024-02-21 DIAGNOSIS — Z74.1 REQUIRES ASSISTANCE WITH ACTIVITIES OF DAILY LIVING (ADL): ICD-10-CM

## 2024-02-21 DIAGNOSIS — Z99.3 WHEELCHAIR DEPENDENT: ICD-10-CM

## 2024-02-21 DIAGNOSIS — I10 HYPERTENSION GOAL BP (BLOOD PRESSURE) < 140/90: ICD-10-CM

## 2024-02-21 DIAGNOSIS — G35 MS (MULTIPLE SCLEROSIS) (H): Primary | ICD-10-CM

## 2024-02-21 DIAGNOSIS — Z53.9 DIAGNOSIS NOT YET DEFINED: Primary | ICD-10-CM

## 2024-02-21 PROCEDURE — G0180 MD CERTIFICATION HHA PATIENT: HCPCS | Performed by: FAMILY MEDICINE

## 2024-02-21 RX ORDER — LOSARTAN POTASSIUM 25 MG/1
25 TABLET ORAL 2 TIMES DAILY
Qty: 180 TABLET | Refills: 3 | Status: SHIPPED | OUTPATIENT
Start: 2024-02-21 | End: 2024-04-03

## 2024-02-21 NOTE — TELEPHONE ENCOUNTER
TSH ordered for Dr. Pulliam.    Dr. Figueredo   [Loss Of Hearing] : hearing loss [Confused] : confusion [Limb Weakness] : limb weakness [Difficulty Walking] : difficulty walking [Easy Bruising] : a tendency for easy bruising [Negative] : Endocrine [FreeTextEntry2] : eats well [de-identified] : left hemiparesis

## 2024-02-21 NOTE — TELEPHONE ENCOUNTER
Home PTA calling to report home blood pressure outside of their parameters.  BP was 174/79 immediately after practicing slide board transfers with .      10 mins later was 169/56.    Pt did not have any complaints per PTA but I asked if she imagined patient was having discomfort and she thought so.    Dr. Pulliam: point of awareness.  If there are different parameters you'd like them to abide by, please let us now.    Triage: no need to call back unless indicated.  OK to LDM on [facility]'s VM.    Dasia LOUIS RN  [facility]

## 2024-02-22 NOTE — TELEPHONE ENCOUNTER
Okay; can you let them know we should increase her losartan to 25 mg twice a day as this has been persistent since we stopped hydrochlorothiazide after hospital stay?    I sent new prescription to pharmacy.    Dr. Carolina Pulliam MD / [facility]

## 2024-02-22 NOTE — TELEPHONE ENCOUNTER
Left message to call back and ask to speak with an available triage nurse.    We need to relay the below message to patient and obtain the name of the home care nurse to relay the information to as well.  Not appropriate to call back to PT assistant.    Dasia LOUIS RN  [facility]

## 2024-02-22 NOTE — TELEPHONE ENCOUNTER
"Dr. Pulliam-Please review and sign if agree.  Writer unable to locate 32\" transfer board order in database.    Patient called back and was informed of message per Dr. Pluliam, along with to which pharmacy medication was sent.    Patient verbalized understanding and in agreement with plan.    Patient requested Dr. Pulliam order a sliding board to get from wheelchair to bed.   Description: 32\" by 10\" wooden transfer board with 2 handles.  2. Medicare says she needs an order.  3. Fax to [facility]: [phone]    Thank you!  SAEID LowN, RN-BC  [facility]        "

## 2024-02-23 NOTE — TELEPHONE ENCOUNTER
Erickson, home PTA calling with elevated home BP readings  after therapy exercises: 173/92  Repeat 10 mins later 193/97  Repeat 1455 191/97    No symptoms  Was having R knee/hip pain 3/10 prior, increased to 4/10 with exercises  Losartan increased just last night  Advised we may simply need time with increase in medication.    Dr. Pulliam: FYI per home care policy    Pt and caller advised to call back with any concerning symptom including headache, nose bleed, or chest pain.    Dasia LOUIS RN  [facility]

## 2024-02-23 NOTE — TELEPHONE ENCOUNTER
Noted, let's give the losartan a few more days to work as I don't want to get her blood pressure too low.    Also - I'm not sure it makes sense to take her blood pressure after therapy exercises or after transfer, or any time she is in a lot of pain. Can you do some education for them:    Best time for blood pressure check is:    For your blood pressure:  It's important that you take your blood pressure correctly:    The best time is first thing in the morning, after you've gone to the bathroom and before you've eaten, and after sitting quietly 5 minutes (no talking, drinking coffee, etc).  2nd best time is before dinner - also after sitting quietly for 5-10 minutes  Note your blood pressure on a paper log or on your phone  Best diet for blood pressure is low sodium, high potassium (DASH) diet, limit or no alcohol.

## 2024-02-23 NOTE — TELEPHONE ENCOUNTER
Signed (FYI that I used DME Miscellaneous and typed in 32X10 wooden transfer board) and it says printed in Care Team 4.    Can you fax to [facility] along with this note below?    Thanks!  Dr. Carolina Pulliam MD / [facility]      DME (Durable Medical Equipment) Orders and Documentation  Orders Placed This Encounter   Procedures    Miscellaneous Order for DME - ONLY FOR DME      The patient was assessed and it was determined the patient is in need of the following listed DME Supplies/Equipment. Please complete supporting documentation below to demonstrate medical necessity.      DME All Other Item(s) Documentation    List reason for need and supporting documentation for medical necessity below for each DME item.     1. Inability to independently transfer given MS, femur fracture, lymphedema, and pressure ulcers.  Patient was seen and evaluated by me on 2/14/2024 and 1/31/2024.    Dr. Carolina Pulliam MD / [facility]

## 2024-02-26 NOTE — TELEPHONE ENCOUNTER
Called patient and advised per Dr. Pulliam.    She took her blood pressure this am prior to getting out of bed and it was 138/74. Discussed best practice as recommended and she will follow those directions.    Myriam Gonzalez RN, BSN, PHN  [facility]

## 2024-02-27 ENCOUNTER — TELEPHONE (OUTPATIENT)
Dept: FAMILY MEDICINE | Facility: CLINIC | Age: 69
End: 2024-02-27
Payer: MEDICARE

## 2024-02-27 NOTE — TELEPHONE ENCOUNTER
The Home Care/Assisted Living/Nursing Facility is calling regarding an established patient.  Has the patient seen Home Care in the past or is currently residing in Assisted Living or Nursing Facility? Yes.     Hansa calling from [facility]  requesting the following orders that are within the Home Care, Assisted Living or Nursing Home Eval and Treatment standing order and can be signed as standing order signature required by RN.    Preferred Call Back Number: [phone]    Patient Care Assistant (PCA)  and Social work evaluation and treatment    Any additional Orders:  Are there any orders requested, not stated above, that are outside of the standing order and must be routed to a licensed practitioner for approval?    No    Writer has verified [facility] will send fax to have orders signed.     Destiney LOUIS RN  [facility]

## 2024-02-28 ENCOUNTER — MEDICAL CORRESPONDENCE (OUTPATIENT)
Dept: HEALTH INFORMATION MANAGEMENT | Facility: CLINIC | Age: 69
End: 2024-02-28

## 2024-02-29 DIAGNOSIS — F33.1 MODERATE EPISODE OF RECURRENT MAJOR DEPRESSIVE DISORDER (H): ICD-10-CM

## 2024-03-01 ENCOUNTER — TELEPHONE (OUTPATIENT)
Dept: FAMILY MEDICINE | Facility: CLINIC | Age: 69
End: 2024-03-01
Payer: MEDICARE

## 2024-03-01 RX ORDER — DULOXETIN HYDROCHLORIDE 60 MG/1
CAPSULE, DELAYED RELEASE ORAL
Qty: 90 CAPSULE | Refills: 3 | Status: SHIPPED | OUTPATIENT
Start: 2024-03-01

## 2024-03-01 NOTE — TELEPHONE ENCOUNTER
Writer called TIFFANIE Jules, and left detailed message on identified, confidential voicemail giving verbal authorization for requested home care orders.    SAEID LowN, RN-BC  [facility]

## 2024-03-01 NOTE — TELEPHONE ENCOUNTER
[facility] PT calling to report updated BP readings. These were all taken while patient was resting comfortably upon [facility]'s arrival to patient's home, prior to any exercises.     170/89  165/86 - 5 min recheck, both taken with [facility]'s BP cuff  153/86 - taken immediately after on patient's home BP cuff    Patient denied pain at the time of BP readings and did not have any symptoms of HTN.     [facility] additionally requesting increase in frequency of PT visits to 2x/week for 3 weeks. (Previous frequency was 2x/week for 1 week and 1x/week for 2 weeks). Please return call to [facility] at [phone], ok to LDM.     Jes Gupta RN BSN  [facility]

## 2024-03-01 NOTE — TELEPHONE ENCOUNTER
Jayla for requested home care orders.  Dr. Carolina Pulliam MD / [facility]

## 2024-03-01 NOTE — TELEPHONE ENCOUNTER
Please advise if home care orders may be given?        Home Care is calling regarding an established patient with [facility].        Orders Requested       Extension of current Occupational Therapy orders as follows:     1 x weekly for 3 weeks, effective 3/2/24.      Verbal order given per protocol.  Thank you.    Myriam Gonzalez, RN, BSN, PHN  [facility]

## 2024-03-04 NOTE — TELEPHONE ENCOUNTER
1-Please give approval on this increased frequency.  Home care order-  [facility] additionally requesting increase in frequency of PT visits to 2x/week for 3 weeks. (Previous frequency was 2x/week for 1 week and 1x/week for 2 weeks). Please return call to [facility] at [phone], ok to LDM.      2-Can they call with blood pressure readings next week?     If still high, we'll increase the losartan.    I reviewed this order with [facility], PT.  HEIDY Stock

## 2024-03-04 NOTE — TELEPHONE ENCOUNTER
Can they call with blood pressure readings next week?    If still high, we'll increase the losartan.    Dr. Carolina Pulliam MD / [facility]

## 2024-03-05 NOTE — TELEPHONE ENCOUNTER
Gave this VO to [facility].  Dayami RN      Home care order-  [facility] additionally requesting increase in frequency of PT visits to 2x/week for 3 weeks. (Previous frequency was 2x/week for 1 week and 1x/week for 2 weeks). Please return call to [facility] at [phone], ok to LDM.

## 2024-03-05 NOTE — TELEPHONE ENCOUNTER
Jayla for requested home care orders.  Dr. Carolina Pulliam MD / [facility]

## 2024-03-13 NOTE — TELEPHONE ENCOUNTER
Hansa, [facility], calling with home blood pressures    All initial BPs prior to therapy and at rest, seated    L 3/6 162/88  R 3/8 154/72 one hour later, supine 129/63  L 3/11 155/88   L 3/12 146/72  R 3/13 175/88    Dasia LOUIS RN  [facility]

## 2024-03-14 NOTE — TELEPHONE ENCOUNTER
Called to schedule initial RN managed HTN visit, ideally for early next week to allow two weeks of interim medication changes if called for between now and upcoming PCP visit. Patient states she would prefer not to have to come in to the clinic an additional time, as she already has an appointment with Dr. Pulliam on 4/3. Patient asked if she could be seen by RN team before this visit; scheduled patient as requested.    123/81 BP this morning  Patient will continue to monitor BP at home and will send updates if elevated. Will continue on initial dose of medication until upcoming visit.    SAEID CarnesN, RN  [facility]

## 2024-03-14 NOTE — TELEPHONE ENCOUNTER
Let's do RN HYPERTENSION visits.    I placed order.    Can you let them know if needed?    Dr. Carolina Pulliam MD / [facility]

## 2024-03-15 ENCOUNTER — TELEPHONE (OUTPATIENT)
Dept: FAMILY MEDICINE | Facility: CLINIC | Age: 69
End: 2024-03-15

## 2024-03-15 NOTE — TELEPHONE ENCOUNTER
Message reviewed and information noted.  No further action at this time.  Thank you,  Dr. Carolina Pulliam MD/[facility]

## 2024-03-15 NOTE — TELEPHONE ENCOUNTER
FYI - Status Update    Who is Calling: [facility]    Update: Today 03/15/24 session patient BP reading checked while laying down at 146/73. After doing leg exercise BP reading checked again at 161/83. Please feel free to call Eric[facility]on back with any question at [phone], ok to leave message.    Does caller want a call/response back: No

## 2024-03-18 ENCOUNTER — TELEPHONE (OUTPATIENT)
Dept: FAMILY MEDICINE | Facility: CLINIC | Age: 69
End: 2024-03-18
Payer: MEDICARE

## 2024-03-18 NOTE — TELEPHONE ENCOUNTER
Dr. Pulliam-Please advise:  Patient unable to come into clinic prior to 4/3/24, which is same day patient is seeing you  Patient is unwilling to come in for additional visits outside of the days she is seeing you  Writer notes patient taking Losartan 25 mg BID  RN is able to titrate once daily dosing of this medication per standing order; this would mean possibly titration patient's Losartan to 100 mg daily as next step  Writer unsure if BID dosing is essential to patient's hypertension management, or if once daily dosing would be an appropriate option?    Thank you!  BABATUNDE Sher BSN, RN-[facility]

## 2024-03-19 DIAGNOSIS — F33.1 MODERATE EPISODE OF RECURRENT MAJOR DEPRESSIVE DISORDER (H): ICD-10-CM

## 2024-03-19 RX ORDER — DULOXETIN HYDROCHLORIDE 20 MG/1
CAPSULE, DELAYED RELEASE ORAL
Qty: 90 CAPSULE | Refills: 1 | Status: SHIPPED | OUTPATIENT
Start: 2024-03-19 | End: 2024-09-11

## 2024-03-19 NOTE — TELEPHONE ENCOUNTER
RN Team-Need to let patient know Dr. Pulliam will manage blood pressure/medication and no need for Registered Nurse blood pressure appt on 4/3/24-this can be cancelled.    Left message to call back and ask to speak with an available triage nurse.    SAEID LowN, RN-[facility]

## 2024-03-19 NOTE — TELEPHONE ENCOUNTER
"\"Hossein Estrella:  In that case, I can just see her on the April date and adjust her medications at that time.  Thank you!  Dr. Carolina Pulliam MD / [facility] \"      "

## 2024-03-19 NOTE — TELEPHONE ENCOUNTER
Writer took incoming call from patient. Relayed Delores's message below to patient. Patient verbalizes understanding, and has no further questions. Patient will keep Preventative visit as scheduled on 4/03/2024.    Cancelled RN Hypertension visit as recommended.    No further action needed.    Closing encounter.    SAEID TorresN, RN   [facility]

## 2024-03-21 ENCOUNTER — TELEPHONE (OUTPATIENT)
Dept: FAMILY MEDICINE | Facility: CLINIC | Age: 69
End: 2024-03-21
Payer: MEDICARE

## 2024-03-21 NOTE — TELEPHONE ENCOUNTER
Writer called and left message on patient's voicemail to call back and speak with a triage nurse.    Confidential voicemail: Relayed message from Dr. Pulliam regarding approval for ongoing home care orders for nursing and HHA.     REVA Dorado RN  [facility]

## 2024-03-21 NOTE — TELEPHONE ENCOUNTER
Jayla for requested home care orders.  Dr. Carolina Pulliam MD / [facility]

## 2024-03-21 NOTE — TELEPHONE ENCOUNTER
Dr. Pulliam,     [facility] is requesting HC visits continuation for SNV and HHA. Please see notes below and provide recommendation.       The Home Care/Assisted Living/Nursing Facility is calling regarding an established patient.  Has the patient seen Home Care in the past or is currently residing in Assisted Living or Nursing Facility? Yes.     Armida calling from [facility] requesting the following orders that are within the Home Care, Assisted Living or Nursing Home Eval and Treatment standing order and can be signed as standing order signature required by RN.    Preferred Call Back Number: [phone]    Home Care Visits Continuation    SNV twice a week x 3 wks for wound and medication management    HHA once a week x 3 wks for shower and bath assist    Any additional Orders:  Are there any orders requested, not stated above, that are outside of the standing order and must be routed to a licensed practitioner for approval?    No    Writer has verified [employer] will send fax to have orders signed.     Destiney LOUIS RN  [facility]

## 2024-03-22 ENCOUNTER — TELEPHONE (OUTPATIENT)
Dept: FAMILY MEDICINE | Facility: CLINIC | Age: 69
End: 2024-03-22
Payer: MEDICARE

## 2024-03-22 NOTE — TELEPHONE ENCOUNTER
Home Care is calling regarding an established patient with [facility].       Requesting orders from: Carolina Pulliam  Provider is following patient: Yes  Is this a 60-day recertification request?  No    Orders Requested    Physical Therapy  Request for continuation of care with increase in frequency  Frequency:  2x/wk for 1 wks (requesting for next week. Patient will be due for recertification after next week).         Information was gathered and will be sent to provider for review.  RN will contact Home Care with information after provider review.  Confirmed ok to leave a detailed message with call back.  Contact information confirmed and updated as needed.    HEIDY Wood PT @ [phone] w/[facility].

## 2024-03-22 NOTE — TELEPHONE ENCOUNTER
PCP is currently out of the clinic through next week.  I am in agreement with the proposed orders for continued PT with increased frequency.    Paco Huerta PA-C

## 2024-03-23 ENCOUNTER — MEDICAL CORRESPONDENCE (OUTPATIENT)
Dept: HEALTH INFORMATION MANAGEMENT | Facility: CLINIC | Age: 69
End: 2024-03-23
Payer: MEDICARE

## 2024-03-26 ENCOUNTER — THERAPY VISIT (OUTPATIENT)
Dept: OCCUPATIONAL THERAPY | Facility: CLINIC | Age: 69
End: 2024-03-26
Attending: FAMILY MEDICINE
Payer: COMMERCIAL

## 2024-03-26 DIAGNOSIS — Z74.09 IMPAIRED MOBILITY AND ADLS: Primary | ICD-10-CM

## 2024-03-26 DIAGNOSIS — G35 MS (MULTIPLE SCLEROSIS) (H): ICD-10-CM

## 2024-03-26 DIAGNOSIS — Z78.9 IMPAIRED MOBILITY AND ADLS: Primary | ICD-10-CM

## 2024-03-26 DIAGNOSIS — L89.309 PRESSURE INJURY OF SKIN OF BUTTOCK, UNSPECIFIED INJURY STAGE, UNSPECIFIED LATERALITY: ICD-10-CM

## 2024-03-26 PROCEDURE — 97542 WHEELCHAIR MNGMENT TRAINING: CPT | Mod: GO | Performed by: OCCUPATIONAL THERAPIST

## 2024-03-26 NOTE — PROGRESS NOTES
SEATING AND WHEELED MOBILITY ASSESSMENT     [facility]  Occupational Therapy   Date of service: March 26, 2024     Referring provider: Carolina gtz    Order Date 1/4/24  Onset Date: 1/4/24     Order Details: pressure sores     Funding: Medicare/[employer]     Height/Weight: 5'8 / 153     Medical diagnosis:     Multiple sclerosis     Pertinent medical history/surgery:  Nervous and Auditory  MS (multiple sclerosis) (H)  Epigastric pain     Respiratory  Lung nodules  Community acquired pneumonia, unspecified laterality     Digestive  Candida esophagitis (H)  Constipation     Endocrine  Hyperlipidemia with target LDL less than 130  Graves disease  Postablative hypothyroidism  Multiple thyroid nodules     Musculoskeletal and Integumentary  L tib fx s/p IM nailing  Fracture of femur, distal, left, closed (H)  Osteoporosis of multiple sites     Urinary  CKD (chronic kidney disease) stage 3, GFR 30-59 ml/min (H)  Acute cystitis without hematuria     Hematologic  Anemia     Infectious/Inflammatory  HSV (herpes simplex virus) infection     Behavioral  Major depression in complete remission (H)  Former tobacco use     Other  UTI prophylaxis  History of fracture of fibula  History of tibial fracture  Atypical ductal hyperplasia of left breast  Ductal carcinoma in situ (DCIS) of left breast  Treatment-emergent central sleep apnea     Patient concerns/goals: wheelchair replacement     Living environment:  House     Living environment barriers:  Ramp(s) present                         Current assistance/living environment:  Lives with      Community Mobility:  Transportation: Transportation Services, Van, [employer] mobility  Community Mobility Requirements: Medical Appointments, Shopping, Restorationist  Accessibility Requirements for Community Settings: STEP therapies 1x per week                            Current mobility  equipment:  Front wheeled walker - for exercise only with STEP     Current Manual WC: Age: 2016, : [facility] 9000, Cushion: Gel, Wheelchair Back: Solid Curved, Footrest Style: swing away, Headrest: No, Settings Used: Home, Outdoor Community Mobility, Primary Means of Transportation, Condition: Fair, Beyond Further Justifiable Repair, Current Equipment Requires: Replacement, Rationale for Equipment Changes: full time heavy duty use, needs lighter options    Current Power w/c Age: 12/6/2023 Quantum stretto T ilt recline, power legs- Smair cryo cushion     WC Use: Ability to Perform Weight Shifts: Assist required via seat functions Bed Confined without WC: Yes, Hours in WC Daily: 10, Hours Spent Alone Daily: 5     Patient Measurements:- Per atp notes, via home visit     Fall Risk Screen:   Has the patient fallen 2 or more times in the last year? Yes                            Has the patient fallen and had an injury in the past year? Yes broken leg                                        Timed Up and Go Score: Not able to perform due to unable     Is the patient a fall risk? Yes, department fall risk interventions implemented     ADL:   Feeding self:  Yes, assist with cutting  Grooming: ind  UE Dressing:  ind  LE Dressing:  LB assist  Showering/bathing: sponge bathes  Toileting/transfer:  Incontinent   Functional Mobility: PWC     Current ADL equipment: Commode  Wall grab bar     IADL:   Meals: small meals only, microwave  Home management:  Spouse completes mostly, deep cleaning PRN  Driving:  Spouse completes  Occupation: n/a  Leisure: n/a  Emergency Call system: cell phone     Sleep Surface/Equipment: standard bed with rail, transfer board     Evaluation                 Pain assessment:  Location: bottom/sacrum/Rating: 10 at times     Cognition:  Tangential     Vision: glasses     Hearing: WNL     Fatigue:  Reported Problems: limited sustained energy to support full time w/c propulsion with UE, assist needed  back to clinic today     Balance:  Unsupported Sitting Balance: Uses UE for Balance  Sitting Balance in Chair: Uses UE for Balance  Standing Balance: Uses UE for Balance, Physical Assistance Required     Ambulation:  Level of Harlan: Dependent  Assistive Device(s): Power wheelchair working on getting back to standing     Transfers:            Slide board- ind to max assist depending on environment, time of day ect     Neuromuscular:  History of Pressure Sores: yes  Current Pressure Sores: yes L IT- healing, 2x per week   Able to Perform Effective Pressure Relief/Reperfusion at Seated Surface: No  Methods of Pressure Relief: Leaning  Methods Performed Consistently Through the Day: No  Coordination:  UE Coordination: SLOW LASHAUN L>R  LE Coordination: Limited LASHAUN     Tone:   Hypotonic  Spasms in LE with pain     Sensation:  Sensory Deficits Reported: LB limitations  Head and Neck:  Head and Neck Position: Flexed  Head Control: Adequate     Upper Extremities  UE ROM: WFL with lagging end ranges and limited full hand oppostion  UE Strength: 4/5 with limited endraunce and grasp  Dominance: Right     Pelvis  Anterior/Posterior Pelvis Position: Posterior Tilt     Trunk:  Anterior/Posterior Trunk Position: Increased Thoracic Kyphosis     Lower Extremities:  LE ROM: Limited active quads, R knee full active, full passive  LE Strength: L 1/5 R quad 2-, knee 3-, ankle 2-  Foot Positioning: Inverted, Plantarflexed, L AFO     Edema: 1+ pitting edema with compression stockings on                 Impairments:  Fatigue  Muscle atrophy  Coordination  Balance  Range of motion  Skin integrity                Treatment diagnosis:  Impaired mobility  Impaired activities of daily living     Recommendations/Plan of care:  Patient would benefit from interventions to enhance safety and independence.  Equipment recommended: power mobility.     Goals:    Target date:   Patient, family and/or caregiver will verbalize/demonstrate understanding  of compensatory methods /equipment to enhance functional independence and safety.     Educational assessment/barriers to learning:  Emotional  Cognitive     Treatment provided this date:   Wheelchair management, 60 minutes              Patient assessed and new power wheelchair that she received in December.  Educated on use of power seat functions and limiting recline as she is mostly been using that.  Use pressure mapping as a tool to assess cushion and left IT very high pressure at 250 mmHg when sitting upright.  Also use it as a tool for her to understand use of power seat functions tilt then recline in order to best manage pressures throughout the day.  Message new motion for home trials of different cushion such as the Roho hybrid in order to best manage pressure.     Response to treatment/recommendations: receptive but resistant     Goal attainment:  All goals met     Risks and benefits of evaluation/treatment have been explained.  Patient, family and/or caregiver are in agreement with Plan of Care.                Timed Code Treatment Minutes: 75  Total Treatment Time (sum of timed and untimed services): 75     Electronically signed by:  Kiara HIDALGO/MICHELLE, ATP          Occupational Therapist, Assistive   [phone]      fax: [phone]      [email or web address]  [facility], [address]  [address]  [place]   [address]  March 26, 2024

## 2024-03-27 ENCOUNTER — ANCILLARY PROCEDURE (OUTPATIENT)
Dept: BONE DENSITY | Facility: CLINIC | Age: 69
End: 2024-03-27
Attending: INTERNAL MEDICINE
Payer: MEDICARE

## 2024-03-27 ENCOUNTER — LAB (OUTPATIENT)
Dept: LAB | Facility: CLINIC | Age: 69
End: 2024-03-27
Payer: MEDICARE

## 2024-03-27 DIAGNOSIS — M81.0 OSTEOPOROSIS OF MULTIPLE SITES: ICD-10-CM

## 2024-03-27 DIAGNOSIS — E89.0 POSTABLATIVE HYPOTHYROIDISM: ICD-10-CM

## 2024-03-27 LAB
T4 FREE SERPL-MCNC: 1.13 NG/DL (ref 0.9–1.7)
TSH SERPL DL<=0.005 MIU/L-ACNC: 11.28 UIU/ML (ref 0.3–4.2)

## 2024-03-27 PROCEDURE — 84443 ASSAY THYROID STIM HORMONE: CPT | Performed by: PATHOLOGY

## 2024-03-27 PROCEDURE — 77080 DXA BONE DENSITY AXIAL: CPT | Performed by: INTERNAL MEDICINE

## 2024-03-27 PROCEDURE — 36415 COLL VENOUS BLD VENIPUNCTURE: CPT | Performed by: PATHOLOGY

## 2024-03-27 PROCEDURE — 84439 ASSAY OF FREE THYROXINE: CPT | Performed by: PATHOLOGY

## 2024-03-29 ENCOUNTER — TELEPHONE (OUTPATIENT)
Dept: FAMILY MEDICINE | Facility: CLINIC | Age: 69
End: 2024-03-29
Payer: MEDICARE

## 2024-03-29 DIAGNOSIS — M21.372 FOOT DROP, BILATERAL: Primary | ICD-10-CM

## 2024-03-29 DIAGNOSIS — M21.371 FOOT DROP, BILATERAL: Primary | ICD-10-CM

## 2024-03-29 NOTE — TELEPHONE ENCOUNTER
Home Care is calling regarding an established patient with [facility].       Requesting orders from: Carolina Pulliam  Provider is following patient: Yes  Is this a 60-day recertification request?  No    Orders Requested    Physical Therapy  Request for continuation of care with increase in frequency  Frequency:  1x/wk for 4 wks      Referral for AFOs bilateral to [facility]    Information was gathered and will be sent to provider for review.  RN will contact Home Care with information after provider review.  Confirmed ok to leave a detailed message with call back.  Contact information confirmed and updated as needed.    Noy Cristobal, RN      [place] PT with [facility] @ [phone]

## 2024-04-01 ENCOUNTER — TELEPHONE (OUTPATIENT)
Dept: ENDOCRINOLOGY | Facility: CLINIC | Age: 69
End: 2024-04-01
Payer: MEDICARE

## 2024-04-01 NOTE — RESULT ENCOUNTER NOTE
The thyroid hormone levels have improved but they remain slightly lowish.  Have you been taking the levothyroxine as instructed: On an empty stomach, with water, 1 hour before breakfast and 4 hours  from multivitamins, over-the-counter supplements and medications for heartburns?

## 2024-04-01 NOTE — TELEPHONE ENCOUNTER
Gave the VO for the PT orders.    I don't see the orthotics order signed.  Please sign and have this faxed to [facility] as fax # [phone]    Please call PT to let them know this was done.  Thanks!  HEIDY Stock

## 2024-04-01 NOTE — TELEPHONE ENCOUNTER
"TC --    Please grab printed order signed from Dr. Pulliam. Looks like printed in clinic in room 4 perhaps (or my office)?    FAX to [facility] as fax # [phone]  Please call PT to let them know this was done. Verbal orders. \"Okay for requested home care orders.\" -- Dr. Pulliam.       Noy Cristobal, BSN RN  [facility]       "

## 2024-04-01 NOTE — TELEPHONE ENCOUNTER
Left Voicemail (1st Attempt) and Sent Mychart (1st Attempt) for the patient to call back and schedule the following:    Appointment type: return endocrine   Provider: Leoncio Flowers or Abhay   Return date: first available for 2nd opinion   Specialty phone number: [phone]  Additional appointment(s) needed:   Additonal Notes:   Neli Bean MD  [id number]  I am interested in getting a second opinion on osteoporosis on this patient. Please schedule her as a return patient with a provider interested in bone disease: Leoncio Flowers Harindhanavudhi.    Steven Parks on 4/1/2024 at 5:30 PM

## 2024-04-01 NOTE — TELEPHONE ENCOUNTER
I signed - but am offsite.  Looks like printed in clinic in room 4 perhaps (or my office)?  Dr. Carolina Pulliam MD / [facility]

## 2024-04-02 ENCOUNTER — LAB (OUTPATIENT)
Dept: LAB | Facility: CLINIC | Age: 69
End: 2024-04-02
Payer: MEDICARE

## 2024-04-02 ENCOUNTER — MEDICAL CORRESPONDENCE (OUTPATIENT)
Dept: HEALTH INFORMATION MANAGEMENT | Facility: CLINIC | Age: 69
End: 2024-04-02

## 2024-04-02 DIAGNOSIS — R30.0 DYSURIA: Primary | ICD-10-CM

## 2024-04-02 LAB
ALBUMIN UR-MCNC: ABNORMAL MG/DL
APPEARANCE UR: ABNORMAL
BACTERIA #/AREA URNS HPF: ABNORMAL /HPF
BILIRUB UR QL STRIP: NEGATIVE
COLOR UR AUTO: YELLOW
GLUCOSE UR STRIP-MCNC: NEGATIVE MG/DL
HGB UR QL STRIP: ABNORMAL
KETONES UR STRIP-MCNC: NEGATIVE MG/DL
LEUKOCYTE ESTERASE UR QL STRIP: ABNORMAL
NITRATE UR QL: POSITIVE
PH UR STRIP: 5 [PH] (ref 5–7)
RBC #/AREA URNS AUTO: ABNORMAL /HPF
SP GR UR STRIP: 1.02 (ref 1–1.03)
SQUAMOUS #/AREA URNS AUTO: ABNORMAL /LPF
UROBILINOGEN UR STRIP-ACNC: 0.2 E.U./DL
WBC #/AREA URNS AUTO: ABNORMAL /HPF

## 2024-04-02 PROCEDURE — 81001 URINALYSIS AUTO W/SCOPE: CPT

## 2024-04-02 PROCEDURE — 87086 URINE CULTURE/COLONY COUNT: CPT

## 2024-04-02 PROCEDURE — 87186 SC STD MICRODIL/AGAR DIL: CPT

## 2024-04-02 SDOH — HEALTH STABILITY: PHYSICAL HEALTH: ON AVERAGE, HOW MANY DAYS PER WEEK DO YOU ENGAGE IN MODERATE TO STRENUOUS EXERCISE (LIKE A BRISK WALK)?: 0 DAYS

## 2024-04-02 SDOH — HEALTH STABILITY: PHYSICAL HEALTH: ON AVERAGE, HOW MANY MINUTES DO YOU ENGAGE IN EXERCISE AT THIS LEVEL?: 20 MIN

## 2024-04-02 ASSESSMENT — SOCIAL DETERMINANTS OF HEALTH (SDOH): HOW OFTEN DO YOU GET TOGETHER WITH FRIENDS OR RELATIVES?: ONCE A WEEK

## 2024-04-02 ASSESSMENT — PATIENT HEALTH QUESTIONNAIRE - PHQ9
SUM OF ALL RESPONSES TO PHQ QUESTIONS 1-9: 6
SUM OF ALL RESPONSES TO PHQ QUESTIONS 1-9: 6
10. IF YOU CHECKED OFF ANY PROBLEMS, HOW DIFFICULT HAVE THESE PROBLEMS MADE IT FOR YOU TO DO YOUR WORK, TAKE CARE OF THINGS AT HOME, OR GET ALONG WITH OTHER PEOPLE: SOMEWHAT DIFFICULT

## 2024-04-02 NOTE — COMMUNITY RESOURCES LIST (ENGLISH)
April 2, 2024           YOUR PERSONALIZED LIST OF SERVICES & PROGRAMS           & RECREATION    Sports      [employer] - Walking Group  [address] [place] [address] (Distance: 0.6 miles)  Phone: [phone]  Language: English  Fee: Free  Accessibility: Ada accessible, Translation services      [facility] - Adult Enrichment  Phone: [phone]  Website: https://[employer]/adults-seniors/adult-enrichment/  Language: English  Hours: Mon 7:30 AM - 4:00 PM Tue 7:30 AM - 4:00 PM Wed 7:30 AM - 4:00 PM Thu 7:30 AM - 4:00 PM Fri 7:30 AM - 4:00 PM      [employer] - LITTLE LEAGUE BASEBALL AND SOFTBALL  Website: http://www.[employer].org    Classes/Groups      [employer] [place] [address] (Distance: 3.5 miles)  Phone: [phone]) [phone]  Website: http://www.[facility].mn/  Language: English  Fee: Free      [facility] - Group Exercise  [address] [place] [address] (Distance: 5.9 miles)  Phone: [phone]) [phone]  Website: https://www.[employer]ia.com.org/locations/[place]/foreverwell/classes_programs  Language: English  Fee: Self pay      [facility] - Adult Enrichment  Phone: [phone]) [phone]  Website: https://[employer]/adults-seniors/adult-enrichment/  Language: English  Hours: Mon 7:30 AM - 4:00 PM Tue 7:30 AM - 4:00 PM Wed 7:30 AM - 4:00 PM Thu 7:30 AM - 4:00 PM Fri 7:30 AM - 4:00 PM               IMPORTANT NUMBERS & WEBSITES        Emergency Services  911  .   [employer] Way  211 http://211unitedway.org  .   Poison Control  (238) 285-1892 http://[place]poison.org http://wis[place]poison.org  .     Suicide and Crisis Lifeline  988 http://988[facility] Hospit[facility].org  .   Childhelp [place] Child Abuse Hotline  247.889.2411 http://Childhelphotline.org   .   National Sexual Assault Hotline  (210) 929-8197 (HOPE) http://Rainn.org   .     National Runaway Safeline  (903) 927-9915 (RUNAWAY)  http://1800runaway.org  .   Pregnancy & Postpartum Support  Call/text 130-693-1876  [place]: http://ppsupportmn.org  [place]: http://psichapters.com/wi  .   Substance Abuse National Helpline ([facility])  451-502-HELP (1755) http://Findtreatment.gov   .                DISCLAIMER: These resources have been generated via the [employer] Platform. [employer] does not endorse any service providers mentioned in this resource list. [employer] does not guarantee that the services mentioned in this resource list will be available to you or will improve your health or wellness.    RUST

## 2024-04-03 ENCOUNTER — OFFICE VISIT (OUTPATIENT)
Dept: FAMILY MEDICINE | Facility: CLINIC | Age: 69
End: 2024-04-03
Payer: MEDICARE

## 2024-04-03 VITALS
DIASTOLIC BLOOD PRESSURE: 79 MMHG | OXYGEN SATURATION: 97 % | HEIGHT: 68 IN | RESPIRATION RATE: 16 BRPM | HEART RATE: 89 BPM | TEMPERATURE: 97.3 F | BODY MASS INDEX: 23.26 KG/M2 | SYSTOLIC BLOOD PRESSURE: 152 MMHG

## 2024-04-03 DIAGNOSIS — N30.00 ACUTE CYSTITIS WITHOUT HEMATURIA: ICD-10-CM

## 2024-04-03 DIAGNOSIS — N39.0 RECURRENT UTI: ICD-10-CM

## 2024-04-03 DIAGNOSIS — Z00.00 ENCOUNTER FOR MEDICARE ANNUAL WELLNESS EXAM: Primary | ICD-10-CM

## 2024-04-03 DIAGNOSIS — F33.1 MODERATE EPISODE OF RECURRENT MAJOR DEPRESSIVE DISORDER (H): ICD-10-CM

## 2024-04-03 DIAGNOSIS — N64.4 BREAST PAIN: ICD-10-CM

## 2024-04-03 DIAGNOSIS — E87.1 HYPONATREMIA: ICD-10-CM

## 2024-04-03 DIAGNOSIS — I10 HYPERTENSION GOAL BP (BLOOD PRESSURE) < 140/90: ICD-10-CM

## 2024-04-03 LAB — BACTERIA UR CULT: ABNORMAL

## 2024-04-03 PROCEDURE — G0439 PPPS, SUBSEQ VISIT: HCPCS | Performed by: FAMILY MEDICINE

## 2024-04-03 PROCEDURE — 99214 OFFICE O/P EST MOD 30 MIN: CPT | Mod: 25 | Performed by: FAMILY MEDICINE

## 2024-04-03 RX ORDER — NITROFURANTOIN 25; 75 MG/1; MG/1
100 CAPSULE ORAL DAILY
Qty: 90 CAPSULE | Refills: 4 | Status: SHIPPED | OUTPATIENT
Start: 2024-04-03

## 2024-04-03 RX ORDER — LOSARTAN POTASSIUM 25 MG/1
TABLET ORAL
Qty: 270 TABLET | Refills: 3 | Status: SHIPPED | OUTPATIENT
Start: 2024-04-03 | End: 2024-04-17

## 2024-04-03 RX ORDER — CIPROFLOXACIN 500 MG/1
500 TABLET, FILM COATED ORAL 2 TIMES DAILY
Qty: 14 TABLET | Refills: 0 | Status: ON HOLD | OUTPATIENT
Start: 2024-04-03 | End: 2024-04-09

## 2024-04-03 ASSESSMENT — COLUMBIA-SUICIDE SEVERITY RATING SCALE - C-SSRS
1. WITHIN THE PAST MONTH, HAVE YOU WISHED YOU WERE DEAD OR WISHED YOU COULD GO TO SLEEP AND NOT WAKE UP?: YES
2. IN THE PAST MONTH, HAVE YOU ACTUALLY HAD ANY THOUGHTS OF KILLING YOURSELF?: NO
6. HAVE YOU EVER DONE ANYTHING, STARTED TO DO ANYTHING, OR PREPARED TO DO ANYTHING TO END YOUR LIFE?: NO

## 2024-04-03 ASSESSMENT — PAIN SCALES - GENERAL: PAINLEVEL: MILD PAIN (3)

## 2024-04-03 NOTE — PATIENT INSTRUCTIONS
I think counseling will be very helpful for you. I'm  glad you are taking this step.    [place] based: [phone]   Our Staff - [facility]: Dedicated to transforming mind and spirit     [phone]  [facility] (One Month)     My colleague Dr. Suzie Nogueira suggested this group as well:  Mental Health Therapy and Counseling  [facility] COUNSELING & PSYCHOLOGY ([facility])       Or in our system:  Amanda HOPKINS, LP at [phone]      Preventive Care Advice   This is general advice given by our system to help you stay healthy. However, your care team may have specific advice just for you. Please talk to your care team about your preventive care needs.  Nutrition  Eat 5 or more servings of fruits and vegetables each day.  Try wheat bread, brown rice and whole grain pasta (instead of white bread, rice, and pasta).  Get enough calcium and vitamin D. Check the label on foods and aim for 100% of the RDA (recommended daily allowance).  Lifestyle  Exercise at least 150 minutes each week   (30 minutes a day, 5 days a week).  Do muscle strengthening activities 2 days a week. These help control your weight and prevent disease.  No smoking.  Wear sunscreen to prevent skin cancer.  Have a dental exam and cleaning every 6 months.  Yearly exams  See your health care team every year to talk about:  Any changes in your health.  Any medicines your care team has prescribed.  Preventive care, family planning, and ways to prevent chronic diseases.  Shots (vaccines)   HPV shots (up to age 26), if you've never had them before.  Hepatitis B shots (up to age 59), if you've never had them before.  COVID-19 shot: Get this shot when it's due.  Flu shot: Get a flu shot every year.  Tetanus shot: Get a tetanus shot every 10 years.  Pneumococcal, hepatitis A, and RSV shots: Ask your care team if you need these based on your risk.  Shingles shot (for age 50 and  up).  General health tests  Diabetes screening:  Starting at age 35, Get screened for diabetes at least every 3 years.  If you are younger than age 35, ask your care team if you should be screened for diabetes.  Cholesterol test: At age 39, start having a cholesterol test every 5 years, or more often if advised.  Bone density scan (DEXA): At age 50, ask your care team if you should have this scan for osteoporosis (brittle bones).  Hepatitis C: Get tested at least once in your life.  STIs (sexually transmitted infections)  Before age 24: Ask your care team if you should be screened for STIs.  After age 24: Get screened for STIs if you're at risk. You are at risk for STIs (including HIV) if:  You are sexually active with more than one person.  You don't use condoms every time.  You or a partner was diagnosed with a sexually transmitted infection.  If you are at risk for HIV, ask about PrEP medicine to prevent HIV.  Get tested for HIV at least once in your life, whether you are at risk for HIV or not.  Cancer screening tests  Cervical cancer screening: If you have a cervix, begin getting regular cervical cancer screening tests at age 21. Most people who have regular screenings with normal results can stop after age 65. Talk about this with your provider.  Breast cancer scan (mammogram): If you've ever had breasts, begin having regular mammograms starting at age 40. This is a scan to check for breast cancer.  Colon cancer screening: It is important to start screening for colon cancer at age 45.  Have a colonoscopy test every 10 years (or more often if you're at risk) Or, ask your provider about stool tests like a FIT test every year or Cologuard test every 3 years.  To learn more about your testing options, visit: [email or web address].pdf.  For help making a decision, visit: https://bit.ly/cn26208.  Prostate cancer screening test: If you have a prostate and are age 55 to 69, ask your provider if you would  benefit from a yearly prostate cancer screening test.  Lung cancer screening: If you are a current or former smoker age 50 to 80, ask your care team if ongoing lung cancer screenings are right for you.  For informational purposes only. Not to replace the advice of your health care provider. Copyright   2023 [employer]. All rights reserved. Clinically reviewed by the [facility]. [employer] [id number] - REV 01/24.    Learning About Stress  What is stress?     Stress is your body's response to a hard situation. Your body can have a physical, emotional, or mental response. Stress is a fact of life for most people, and it affects everyone differently. What causes stress for you may not be stressful for someone else.  A lot of things can cause stress. You may feel stress when you go on a job interview, take a test, or run a race. This kind of short-term stress is normal and even useful. It can help you if you need to work hard or react quickly. For example, stress can help you finish an important job on time.  Long-term stress is caused by ongoing stressful situations or events. Examples of long-term stress include long-term health problems, ongoing problems at work, or conflicts in your family. Long-term stress can harm your health.  How does stress affect your health?  When you are stressed, your body responds as though you are in danger. It makes hormones that speed up your heart, make you breathe faster, and give you a burst of energy. This is called the fight-or-flight stress response. If the stress is over quickly, your body goes back to normal and no harm is done.  But if stress happens too often or lasts too long, it can have bad effects. Long-term stress can make you more likely to get sick, and it can make symptoms of some diseases worse. If you tense up when you are stressed, you may develop neck, shoulder, or low back pain. Stress is linked to high blood pressure and heart  disease.  Stress also harms your emotional health. It can make you cervantes, tense, or depressed. Your relationships may suffer, and you may not do well at work or school.  What can you do to manage stress?  You can try these things to help manage stress:   Do something active. Exercise or activity can help reduce stress. Walking is a great way to get started. Even everyday activities such as housecleaning or yard work can help.  Try yoga or zoltan chi. These techniques combine exercise and meditation. You may need some training at first to learn them.  Do something you enjoy. For example, listen to music or go to a movie. Practice your hobby or do volunteer work.  Meditate. This can help you relax, because you are not worrying about what happened before or what may happen in the future.  Do guided imagery. Imagine yourself in any setting that helps you feel calm. You can use online videos, books, or a teacher to guide you.  Do breathing exercises. For example:  From a standing position, bend forward from the waist with your knees slightly bent. Let your arms dangle close to the floor.  Breathe in slowly and deeply as you return to a standing position. Roll up slowly and lift your head last.  Hold your breath for just a few seconds in the standing position.  Breathe out slowly and bend forward from the waist.  Let your feelings out. Talk, laugh, cry, and express anger when you need to. Talking with supportive friends or family, a counselor, or a luis alfredo leader about your feelings is a healthy way to relieve stress. Avoid discussing your feelings with people who make you feel worse.  Write. It may help to write about things that are bothering you. This helps you find out how much stress you feel and what is causing it. When you know this, you can find better ways to cope.  What can you do to prevent stress?  You might try some of these things to help prevent stress:  Manage your time. This helps you find time to do the  "things you want and need to do.  Get enough sleep. Your body recovers from the stresses of the day while you are sleeping.  Get support. Your family, friends, and community can make a difference in how you experience stress.  Limit your news feed. Avoid or limit time on social media or news that may make you feel stressed.  Do something active. Exercise or activity can help reduce stress. Walking is a great way to get started.  Where can you learn more?  Go to https://www.[employer].net/patiented  Enter N032 in the search box to learn more about \"Learning About Stress.\"  Current as of: October 24, 2023               Content Version: 14.0    0226-0196 [employer].   Care instructions adapted under license by your healthcare professional. If you have questions about a medical condition or this instruction, always ask your healthcare professional. [employer] disclaims any warranty or liability for your use of this information.      Learning About Sleeping Well  What does sleeping well mean?     Sleeping well means getting enough sleep to feel good and stay healthy. How much sleep is enough varies among people.  The number of hours you sleep and how you feel when you wake up are both important. If you do not feel refreshed, you probably need more sleep. Another sign of not getting enough sleep is feeling tired during the day.  Experts recommend that adults get at least 7 or more hours of sleep per day. Children and older adults need more sleep.  Why is getting enough sleep important?  Getting enough quality sleep is a basic part of good health. When your sleep suffers, your physical health, mood, and your thoughts can suffer too. You may find yourself feeling more grumpy or stressed. Not getting enough sleep also can lead to serious problems, including injury, accidents, anxiety, and depression.  What might cause poor sleeping?  Many things can cause sleep problems, including:  Changes to your " "sleep schedule.  Stress. Stress can be caused by fear about a single event, such as giving a speech. Or you may have ongoing stress, such as worry about work or school.  Depression, anxiety, and other mental or emotional conditions.  Changes in your sleep habits or surroundings. This includes changes that happen where you sleep, such as noise, light, or sleeping in a different bed. It also includes changes in your sleep pattern, such as having jet lag or working a late shift.  Health problems, such as pain, breathing problems, and restless legs syndrome.  Lack of regular exercise.  Using alcohol, nicotine, or caffeine before bed.  How can you help yourself?  Here are some tips that may help you sleep more soundly and wake up feeling more refreshed.  Your sleeping area   Use your bedroom only for sleeping and sex. A bit of light reading may help you fall asleep. But if it doesn't, do your reading elsewhere in the house. Try not to use your TV, computer, smartphone, or tablet while you are in bed.  Be sure your bed is big enough to stretch out comfortably, especially if you have a sleep partner.  Keep your bedroom quiet, dark, and cool. Use curtains, blinds, or a sleep mask to block out light. To block out noise, use earplugs, soothing music, or a \"white noise\" machine.  Your evening and bedtime routine   Create a relaxing bedtime routine. You might want to take a warm shower or bath, or listen to soothing music.  Go to bed at the same time every night. And get up at the same time every morning, even if you feel tired.  What to avoid   Limit caffeine (coffee, tea, caffeinated sodas) during the day, and don't have any for at least 6 hours before bedtime.  Avoid drinking alcohol before bedtime. Alcohol can cause you to wake up more often during the night.  Try not to smoke or use tobacco, especially in the evening. Nicotine can keep you awake.  Limit naps during the day, especially close to bedtime.  Avoid lying in bed " "awake for too long. If you can't fall asleep or if you wake up in the middle of the night and can't get back to sleep within about 20 minutes, get out of bed and go to another room until you feel sleepy.  Avoid taking medicine right before bed that may keep you awake or make you feel hyper or energized. Your doctor can tell you if your medicine may do this and if you can take it earlier in the day.  If you can't sleep   Imagine yourself in a peaceful, pleasant scene. Focus on the details and feelings of being in a place that is relaxing.  Get up and do a quiet or boring activity until you feel sleepy.  Avoid drinking any liquids before going to bed to help prevent waking up often to use the bathroom.  Where can you learn more?  Go to https://www.[employer]net/patiented  Enter J942 in the search box to learn more about \"Learning About Sleeping Well.\"  Current as of: July 10, 2023               Content Version: 14.0    4128-8643 [employer].   Care instructions adapted under license by your healthcare professional. If you have questions about a medical condition or this instruction, always ask your healthcare professional. [employer] disclaims any warranty or liability for your use of this information.      Learning About Depression Screening  What is depression screening?  Depression screening is a way to see if you have depression symptoms. It may be done by a doctor or counselor. It's often part of a routine checkup. That's because your mental health is just as important as your physical health.  Depression is a mental health condition that affects how you feel, think, and act. You may:  Have less energy.  Lose interest in your daily activities.  Feel sad and grouchy for a long time.  Depression is very common. It affects people of all ages.  Many things can lead to depression. Some people become depressed after they have a stroke or find out they have a major illness like cancer or " "heart disease. The death of a loved one or a breakup may lead to depression. It can run in families. Most experts believe that a combination of inherited genes and stressful life events can cause it.  What happens during screening?  You may be asked to fill out a form about your depression symptoms. You and the doctor will discuss your answers. The doctor may ask you more questions to learn more about how you think, act, and feel.  What happens after screening?  If you have symptoms of depression, your doctor will talk to you about your options.  Doctors usually treat depression with medicines or counseling. Often, combining the two works best. Many people don't get help because they think that they'll get over the depression on their own. But people with depression may not get better unless they get treatment.  The cause of depression is not well understood. There may be many factors involved. But if you have depression, it's not your fault.  A serious symptom of depression is thinking about death or suicide. If you or someone you care about talks about this or about feeling hopeless, get help right away.  It's important to know that depression can be treated. Medicine, counseling, and self-care may help.  Where can you learn more?  Go to https://www.[employer].net/patiented  Enter T185 in the search box to learn more about \"Learning About Depression Screening.\"  Current as of: June 24, 2023               Content Version: 14.0    1588-5366 [employer].   Care instructions adapted under license by your healthcare professional. If you have questions about a medical condition or this instruction, always ask your healthcare professional. [employer] disclaims any warranty or liability for your use of this information.      Chronic Pain: Care Instructions  Your Care Instructions     Chronic pain is pain that lasts a long time (months or even years) and may or may not have a clear cause. It is " different from acute pain, which usually does have a clear cause--like an injury or illness--and gets better over time. Chronic pain:  Lasts over time but may vary from day to day.  Does not go away despite efforts to end it.  May disrupt your sleep and lead to fatigue.  May cause depression or anxiety.  May make your muscles tense, causing more pain.  Can disrupt your work, hobbies, home life, and relationships with friends and family.  Chronic pain is a very real condition. It is not just in your head. Treatment can help and usually includes several methods used together, such as medicines, physical therapy, exercise, and other treatments. Learning how to relax and changing negative thought patterns can also help you cope.  Chronic pain is complex. Taking an active role in your treatment will help you better manage your pain. Tell your doctor if you have trouble dealing with your pain. You may have to try several things before you find what works best for you.  Follow-up care is a key part of your treatment and safety. Be sure to make and go to all appointments, and call your doctor if you are having problems. It's also a good idea to know your test results and keep a list of the medicines you take.  How can you care for yourself at home?  Pace yourself. Break up large jobs into smaller tasks. Save harder tasks for days when you have less pain, or go back and forth between hard tasks and easier ones. Take rest breaks.  Relax, and reduce stress. Relaxation techniques such as deep breathing or meditation can help.  Keep moving. Gentle, daily exercise can help reduce pain over the long run. Try low- or no-impact exercises such as walking, swimming, and stationary biking. Do stretches to stay flexible.  Try heat, cold packs, and massage.  Get enough sleep. Chronic pain can make you tired and drain your energy. Talk with your doctor if you have trouble sleeping because of pain.  Think positive. Your thoughts can affect  your pain level. Do things that you enjoy to distract yourself when you have pain instead of focusing on the pain. See a movie, read a book, listen to music, or spend time with a friend.  If you think you are depressed, talk to your doctor about treatment.  Keep a daily pain diary. Record how your moods, thoughts, sleep patterns, activities, and medicine affect your pain. You may find that your pain is worse during or after certain activities or when you are feeling a certain emotion. Having a record of your pain can help you and your doctor find the best ways to treat your pain.  Take pain medicines exactly as directed.  If the doctor gave you a prescription medicine for pain, take it as prescribed.  If you are not taking a prescription pain medicine, ask your doctor if you can take an over-the-counter medicine.  Reducing constipation caused by pain medicine  Talk to your doctor about a laxative. If a laxative doesn't work, your doctor may suggest a prescription medicine.  Include fruits, vegetables, beans, and whole grains in your diet each day. These foods are high in fiber.  If your doctor recommends it, get more exercise. Walking is a good choice. Bit by bit, increase the amount you walk every day. Try for at least 30 minutes on most days of the week.  Schedule time each day for a bowel movement. A daily routine may help. Take your time and do not strain when having a bowel movement.  When should you call for help?   Call your doctor now or seek immediate medical care if:    Your pain gets worse or is out of control.     You feel down or blue, or you do not enjoy things like you once did. You may be depressed, which is common in people with chronic pain. Depression can be treated.     You have vomiting or cramps for more than 2 hours.   Watch closely for changes in your health, and be sure to contact your doctor if:    You cannot sleep because of pain.     You are very worried or anxious about your pain.      "You have trouble taking your pain medicine.     You have any concerns about your pain medicine.     You have trouble with bowel movements, such as:  No bowel movement in 3 days.  Blood in the anal area, in your stool, or on the toilet paper.  Diarrhea for more than 24 hours.   Where can you learn more?  Go to https://www.[employer].net/patiented  Enter N004 in the search box to learn more about \"Chronic Pain: Care Instructions.\"  Current as of: July 10, 2023               Content Version: 14.0    8584-0094 [employer].   Care instructions adapted under license by your healthcare professional. If you have questions about a medical condition or this instruction, always ask your healthcare professional. [employer] disclaims any warranty or liability for your use of this information.      "

## 2024-04-03 NOTE — PROGRESS NOTES
Preventive Care Visit  [facility]  Carolina Pulliam MD, Family Medicine  Apr 3, 2024      Assessment & Plan     Marylee was seen today for annual visit.    Diagnoses and all orders for this visit:    Encounter for Medicare annual wellness exam    Moderate episode of recurrent major depressive disorder (H)  Comments:  Referred therapy - [facility] and also referred within our system.  Orders:  -     CBC with platelets; Future  -     Adult Mental Health  Referral; Future    Acute cystitis without hematuria  Comments:  Cipro.  Has had more UTI since stopping nitrofurantoin in Feb  Restart.  Recheck BMP in 1 month  Orders:  -     ciprofloxacin (CIPRO) 500 MG tablet; Take 1 tablet (500 mg) by mouth 2 times daily for 7 days  -     nitroFURantoin macrocrystal-monohydrate (MACROBID) 100 MG capsule; Take 1 capsule (100 mg) by mouth daily    Hypertension goal BP (blood pressure) < 140/90  Comments:  Can no longer be on hctz; hyponatremia  BP at goal only ~50% of time.  Increase losartan to 75 mg (2 in PM, 1 in AM)  Orders:  -     losartan (COZAAR) 25 MG tablet; Take 1 in the AM (25 mg) and 2 in the PM (50 mg)    Hyponatremia  Comments:  Hx of; we stopped nitrofurantion and hctz to help with this.  Restart nitrofurantoin today for UTI prophylaxis  Recheck sodium 1 month  Orders:  -     Basic metabolic panel  (Ca, Cl, CO2, Creat, Gluc, K, Na, BUN); Standing    Breast pain  Comments:  with hx of breast cancer  Ordered diag mammo and US  Orders:  -     US Breast Left Complete 4 Quadrants; Future  -     MA Diagnostic Digital Bilateral; Future    Recurrent UTI  Comments:  2024: restart nitrofurantoin and watch carefully for hyponatremia    Other orders  -     PRIMARY CARE FOLLOW-UP SCHEDULING; Future  -     UA Macroscopic with reflex to Microscopic and Culture - Lab Collect  -     PRIMARY CARE FOLLOW-UP SCHEDULING; Future          Nicotine/Tobacco Cessation  She reports that she has been  smoking cigarettes. She has been smoking an average of 0.3 packs per day. She has been exposed to tobacco smoke. She has never used smokeless tobacco.  Nicotine/Tobacco Cessation Plan  Information offered: Patient not interested at this time      Depression Screening Follow Up              4/3/2024     4:28 PM   [facility] ([facility])   Within the last month, have you wished you were dead or wished you could go to sleep and not wake up? Yes   Within the last month, have you had any actual thoughts of killing yourself? No   Within the last month, have you ever done anything, started to do anything, or prepared to do anything to end your life? No           Follow Up Actions Taken  Crisis resource information provided in the After Visit Summary    Discussed the following ways the patient can remain in a safe environment:    Counseling  Appropriate preventive services were discussed with this patient, including applicable screening as appropriate for fall prevention, nutrition, physical activity, Tobacco-use cessation, weight loss and cognition.  Checklist reviewing preventive services available has been given to the patient.  Reviewed patient's diet, addressing concerns and/or questions.   The patient was instructed to see the dentist every 6 months.   She is at risk for psychosocial distress and has been provided with information to reduce risk.   Discussed possible causes of fatigue. Updated plan of care.  Patient reported difficulty with activities of daily living were addressed today.The patient was provided with written information regarding signs of hearing loss.   Information on urinary incontinence and treatment options given to patient.   The patient's PHQ-9 score is consistent with mild depression. She was provided with information regarding depression.   I have reviewed Opioid Use Disorder and Substance Use Disorder risk factors and made any needed referrals.           Subjective Marylee is a 68 year old,  presenting for the following:  Annual Visit        4/3/2024     3:18 PM   Additional Questions   Roomed by Sheng Iqbal   Accompanied by Manuel ()         Health Care Directive  Patient does not have a Health Care Directive or Living Will: Patient states has Advance Directive and will bring in a copy to clinic. Have the forms and will fill them out    HPI              4/2/2024   General Health   How would you rate your overall physical health? (!) FAIR   Feel stress (tense, anxious, or unable to sleep) Only a little   (!) STRESS CONCERN      4/2/2024   Nutrition   Diet: Regular (no restrictions)         4/2/2024   Exercise   Days per week of moderate/strenous exercise 0 days   Average minutes spent exercising at this level 20 min   (!) EXERCISE CONCERN      4/2/2024   Social Factors   Frequency of gathering with friends or relatives Once a week   Worry food won't last until get money to buy more No   Food not last or not have enough money for food? No   Do you have housing?  Yes   Are you worried about losing your housing? No   Lack of transportation? No   Unable to get utilities (heat,electricity)? No         4/2/2024   Activities of Daily Living- Home Safety   Needs help with the following daily activites Transportation    Shopping    Preparing meals    Housework    Bathing    Laundry    Toileting    Dressing   Safety concerns in the home None of the above         4/2/2024   Dental   Dentist two times every year? (!) NO         4/2/2024   Hearing Screening   Hearing concerns? (!) IT'S HARD TO FOLLOW A CONVERSATION IN A NOISY RESTAURANT OR CROWDED ROOM.         4/2/2024   Driving Risk Screening   Patient/family members have concerns about driving No         4/2/2024   General Alertness/Fatigue Screening   Have you been more tired than usual lately? (!) YES         4/2/2024   Urinary Incontinence Screening   Bothered by leaking urine in past 6 months Yes          Today's PHQ-9 Score:       4/2/2024    12:29 PM    PHQ-9 SCORE   PHQ-9 Total Score MyChart 6 (Mild depression)   PHQ-9 Total Score 6         2024   Substance Use   Alcohol more than 3/day or more than 7/wk No   Do you have a current opioid prescription? (!) YES   How severe/bad is pain from 1 to 10? 3/10   Do you use any other substances recreationally? (!) ALCOHOL    (!) CANNABIS PRODUCTS          No data to display              Low Risk (0-3)  Moderate Risk (4-7)  High Risk (>8)  Social History     Tobacco Use    Smoking status: Some Days     Packs/day: .25     Types: Cigarettes     Last attempt to quit: 2022     Years since quittin.2     Passive exposure: Current    Smokeless tobacco: Never    Tobacco comments:     Once in a while    Vaping Use    Vaping Use: Never used   Substance Use Topics    Alcohol use: Yes     Alcohol/week: 0.0 standard drinks of alcohol     Comment: occasional     Drug use: No           2023   LAST FHS-7 RESULTS   1st degree relative breast or ovarian cancer No   Any relative bilateral breast cancer No   Any male have breast cancer No   Any ONE woman have BOTH breast AND ovarian cancer No   Any woman with breast cancer before 50yrs No   2 or more relatives with breast AND/OR ovarian cancer No   2 or more relatives with breast AND/OR bowel cancer No                    Reviewed and updated as needed this visit by Provider                      Current providers sharing in care for this patient include:  Patient Care Team:  Carolina Pulliam MD as PCP - General (Family Medicine)  Jose Valadez MD as MD (Orthopedics)  Neli Bean MD as Assigned Endocrinology Provider  Amanda Liu MD as MD (Surgery)  Maru Tipton NP as Referring Physician (Nurse Practitioner - Family)  Rod Cintron MD as MD (Critical Care)  Reny Spence PA-C as Physician Assistant (Physician Assistant - Medical)  Carolina Pulliam MD as Assigned PCP  Rod Cintron MD as Assigned Pulmonology  "Provider  Radha Dias, RN as Specialty Care Coordinator (Hematology & Oncology)  Kenny Martinez MD as Assigned Cancer Care Provider  Jero Em MD as Assigned Musculoskeletal Provider    The following health maintenance items are reviewed in Epic and correct as of today:  Health Maintenance   Topic Date Due    COVID-19 Vaccine (3 - Moderna risk series) 02/21/2022    ZOSTER IMMUNIZATION (2 of 2) 11/29/2023    MEDICARE ANNUAL WELLNESS VISIT  03/17/2024    NICOTINE/TOBACCO CESSATION COUNSELING Q 1 YR  04/26/2024    LUNG CANCER SCREENING  06/16/2024    BMP  07/29/2024    MAMMO SCREENING  08/17/2024    PHQ-9  10/03/2024    DTAP/TDAP/TD IMMUNIZATION (2 - Td or Tdap) 11/04/2024    ANNUAL REVIEW OF HM ORDERS  01/31/2025    TSH W/FREE T4 REFLEX  03/27/2025    FALL RISK ASSESSMENT  04/03/2025    COLORECTAL CANCER SCREENING  01/26/2027    GLUCOSE  01/29/2027    LIPID  03/09/2028    ADVANCE CARE PLANNING  03/17/2028    DEXA  03/27/2039    HEPATITIS C SCREENING  Completed    DEPRESSION ACTION PLAN  Completed    INFLUENZA VACCINE  Completed    Pneumococcal Vaccine: 65+ Years  Completed    RSV VACCINE (Pregnancy & 60+)  Completed    IPV IMMUNIZATION  Aged Out    HPV IMMUNIZATION  Aged Out    MENINGITIS IMMUNIZATION  Aged Out    RSV MONOCLONAL ANTIBODY  Aged Out    PAP  Discontinued            Objective    Exam  BP (!) 152/79 (BP Location: Right arm, Patient Position: Sitting, Cuff Size: Adult Regular)   Pulse 89   Temp 97.3  F (36.3  C) (Temporal)   Resp 16   Ht 1.727 m (5' 8\")   LMP 01/31/2004 (Approximate)   SpO2 97%   Breastfeeding No   BMI 23.26 kg/m     Estimated body mass index is 23.26 kg/m  as calculated from the following:    Height as of this encounter: 1.727 m (5' 8\").    Weight as of 1/31/24: 69.4 kg (153 lb).    Physical Exam  GENERAL: alert and no distress  BREAST: left breast pain        4/3/2024   Mini Cog   Clock Draw Score 2 Normal   3 Item Recall 3 objects recalled   Mini Cog Total " Score 5              Signed Electronically by: Carolina Pulliam MD    Answers submitted by the patient for this visit:  Patient Health Questionnaire (Submitted on 4/2/2024)  If you checked off any problems, how difficult have these problems made it for you to do your work, take care of things at home, or get along with other people?: Somewhat difficult  PHQ9 TOTAL SCORE: 6

## 2024-04-03 NOTE — TELEPHONE ENCOUNTER
Left Voicemail (2nd Attempt) and Sent Mychart (2nd Attempt) for the patient to call back and schedule the following:    Appointment type: return endocrine   Provider: Leoncio Flowers or Abhay   Return date: first available for 2nd opinion   Specialty phone number: [phone]  Additional appointment(s) needed:   Additonal Notes:   Neli Bean MD  [id number]  I am interested in getting a second opinion on osteoporosis on this patient. Please schedule her as a return patient with a provider interested in bone disease: Leoncio Flowers Harindhanavudhi.       Steven Parks on 4/3/2024 at 11:40 AM

## 2024-04-05 ENCOUNTER — APPOINTMENT (OUTPATIENT)
Dept: GENERAL RADIOLOGY | Facility: CLINIC | Age: 69
DRG: 202 | End: 2024-04-05
Attending: EMERGENCY MEDICINE
Payer: MEDICARE

## 2024-04-05 ENCOUNTER — NURSE TRIAGE (OUTPATIENT)
Dept: FAMILY MEDICINE | Facility: CLINIC | Age: 69
End: 2024-04-05

## 2024-04-05 ENCOUNTER — APPOINTMENT (OUTPATIENT)
Dept: CT IMAGING | Facility: CLINIC | Age: 69
DRG: 202 | End: 2024-04-05
Attending: EMERGENCY MEDICINE
Payer: MEDICARE

## 2024-04-05 ENCOUNTER — OFFICE VISIT (OUTPATIENT)
Dept: PEDIATRICS | Facility: CLINIC | Age: 69
End: 2024-04-05
Payer: MEDICARE

## 2024-04-05 ENCOUNTER — HOSPITAL ENCOUNTER (INPATIENT)
Facility: CLINIC | Age: 69
LOS: 5 days | Discharge: HOME HEALTH | DRG: 202 | End: 2024-04-10
Attending: EMERGENCY MEDICINE | Admitting: INTERNAL MEDICINE
Payer: MEDICARE

## 2024-04-05 VITALS
OXYGEN SATURATION: 85 % | DIASTOLIC BLOOD PRESSURE: 73 MMHG | RESPIRATION RATE: 22 BRPM | TEMPERATURE: 98 F | HEART RATE: 109 BPM | SYSTOLIC BLOOD PRESSURE: 120 MMHG

## 2024-04-05 DIAGNOSIS — Z78.9 ON SUPPLEMENTAL OXYGEN BY NASAL CANNULA: ICD-10-CM

## 2024-04-05 DIAGNOSIS — K59.00 CONSTIPATION, UNSPECIFIED CONSTIPATION TYPE: ICD-10-CM

## 2024-04-05 DIAGNOSIS — N39.0 RECURRENT UTI: ICD-10-CM

## 2024-04-05 DIAGNOSIS — G47.39 TREATMENT-EMERGENT CENTRAL SLEEP APNEA: ICD-10-CM

## 2024-04-05 DIAGNOSIS — R05.9 COUGH, UNSPECIFIED TYPE: ICD-10-CM

## 2024-04-05 DIAGNOSIS — R79.81 LOW OXYGEN SATURATION: ICD-10-CM

## 2024-04-05 DIAGNOSIS — Z91.89 AT HIGH RISK FOR SKIN BREAKDOWN: Primary | ICD-10-CM

## 2024-04-05 DIAGNOSIS — R50.9 FEVER, UNSPECIFIED FEVER CAUSE: ICD-10-CM

## 2024-04-05 DIAGNOSIS — D05.12 DUCTAL CARCINOMA IN SITU (DCIS) OF LEFT BREAST: ICD-10-CM

## 2024-04-05 DIAGNOSIS — N31.9 NEUROGENIC BLADDER: ICD-10-CM

## 2024-04-05 DIAGNOSIS — R09.02 HYPOXEMIA: ICD-10-CM

## 2024-04-05 DIAGNOSIS — E89.0 POSTABLATIVE HYPOTHYROIDISM: ICD-10-CM

## 2024-04-05 DIAGNOSIS — J96.01 ACUTE RESPIRATORY FAILURE WITH HYPOXIA (H): ICD-10-CM

## 2024-04-05 DIAGNOSIS — G35 MS (MULTIPLE SCLEROSIS) (H): ICD-10-CM

## 2024-04-05 DIAGNOSIS — R05.1 ACUTE COUGH: Primary | ICD-10-CM

## 2024-04-05 LAB
ALBUMIN SERPL BCG-MCNC: 4.1 G/DL (ref 3.5–5.2)
ALP SERPL-CCNC: 89 U/L (ref 40–150)
ALT SERPL W P-5'-P-CCNC: 12 U/L (ref 0–50)
ANION GAP SERPL CALCULATED.3IONS-SCNC: 12 MMOL/L (ref 7–15)
AST SERPL W P-5'-P-CCNC: 22 U/L (ref 0–45)
ATRIAL RATE - MUSE: 93 BPM
BASOPHILS # BLD AUTO: 0 10E3/UL (ref 0–0.2)
BASOPHILS NFR BLD AUTO: 0 %
BILIRUB SERPL-MCNC: 0.2 MG/DL
BUN SERPL-MCNC: 14.6 MG/DL (ref 8–23)
CALCIUM SERPL-MCNC: 9.1 MG/DL (ref 8.8–10.2)
CHLORIDE SERPL-SCNC: 100 MMOL/L (ref 98–107)
CREAT SERPL-MCNC: 0.84 MG/DL (ref 0.51–0.95)
D DIMER PPP FEU-MCNC: 1.12 UG/ML FEU (ref 0–0.5)
DEPRECATED HCO3 PLAS-SCNC: 26 MMOL/L (ref 22–29)
DIASTOLIC BLOOD PRESSURE - MUSE: NORMAL MMHG
EGFRCR SERPLBLD CKD-EPI 2021: 75 ML/MIN/1.73M2
EOSINOPHIL # BLD AUTO: 0 10E3/UL (ref 0–0.7)
EOSINOPHIL NFR BLD AUTO: 1 %
ERYTHROCYTE [DISTWIDTH] IN BLOOD BY AUTOMATED COUNT: 12 % (ref 10–15)
FLUAV RNA SPEC QL NAA+PROBE: NEGATIVE
FLUBV RNA RESP QL NAA+PROBE: NEGATIVE
GLUCOSE SERPL-MCNC: 96 MG/DL (ref 70–99)
HCT VFR BLD AUTO: 33 % (ref 35–47)
HGB BLD-MCNC: 10.7 G/DL (ref 11.7–15.7)
IMM GRANULOCYTES # BLD: 0 10E3/UL
IMM GRANULOCYTES NFR BLD: 0 %
INTERPRETATION ECG - MUSE: NORMAL
LACTATE SERPL-SCNC: 0.9 MMOL/L (ref 0.7–2)
LYMPHOCYTES # BLD AUTO: 0.9 10E3/UL (ref 0.8–5.3)
LYMPHOCYTES NFR BLD AUTO: 14 %
MCH RBC QN AUTO: 30.6 PG (ref 26.5–33)
MCHC RBC AUTO-ENTMCNC: 32.4 G/DL (ref 31.5–36.5)
MCV RBC AUTO: 94 FL (ref 78–100)
MONOCYTES # BLD AUTO: 0.3 10E3/UL (ref 0–1.3)
MONOCYTES NFR BLD AUTO: 4 %
NEUTROPHILS # BLD AUTO: 4.8 10E3/UL (ref 1.6–8.3)
NEUTROPHILS NFR BLD AUTO: 81 %
NRBC # BLD AUTO: 0 10E3/UL
NRBC BLD AUTO-RTO: 0 /100
P AXIS - MUSE: 84 DEGREES
PLATELET # BLD AUTO: 178 10E3/UL (ref 150–450)
POTASSIUM SERPL-SCNC: 4 MMOL/L (ref 3.4–5.3)
PR INTERVAL - MUSE: 156 MS
PROT SERPL-MCNC: 7.8 G/DL (ref 6.4–8.3)
QRS DURATION - MUSE: 80 MS
QT - MUSE: 344 MS
QTC - MUSE: 427 MS
R AXIS - MUSE: 33 DEGREES
RBC # BLD AUTO: 3.5 10E6/UL (ref 3.8–5.2)
RSV RNA SPEC NAA+PROBE: NEGATIVE
SARS-COV-2 RNA RESP QL NAA+PROBE: NEGATIVE
SODIUM SERPL-SCNC: 138 MMOL/L (ref 135–145)
SYSTOLIC BLOOD PRESSURE - MUSE: NORMAL MMHG
T AXIS - MUSE: 66 DEGREES
TROPONIN T SERPL HS-MCNC: 34 NG/L
TROPONIN T SERPL HS-MCNC: 34 NG/L
VENTRICULAR RATE- MUSE: 93 BPM
WBC # BLD AUTO: 6 10E3/UL (ref 4–11)

## 2024-04-05 PROCEDURE — 250N000009 HC RX 250: Performed by: EMERGENCY MEDICINE

## 2024-04-05 PROCEDURE — 99214 OFFICE O/P EST MOD 30 MIN: CPT | Performed by: NURSE PRACTITIONER

## 2024-04-05 PROCEDURE — 250N000011 HC RX IP 250 OP 636: Performed by: EMERGENCY MEDICINE

## 2024-04-05 PROCEDURE — 36415 COLL VENOUS BLD VENIPUNCTURE: CPT | Performed by: EMERGENCY MEDICINE

## 2024-04-05 PROCEDURE — 999N000128 HC STATISTIC PERIPHERAL IV START W/O US GUIDANCE

## 2024-04-05 PROCEDURE — 85379 FIBRIN DEGRADATION QUANT: CPT | Performed by: EMERGENCY MEDICINE

## 2024-04-05 PROCEDURE — 85041 AUTOMATED RBC COUNT: CPT | Performed by: EMERGENCY MEDICINE

## 2024-04-05 PROCEDURE — 99223 1ST HOSP IP/OBS HIGH 75: CPT | Mod: AI | Performed by: PHYSICIAN ASSISTANT

## 2024-04-05 PROCEDURE — 258N000003 HC RX IP 258 OP 636: Performed by: PHYSICIAN ASSISTANT

## 2024-04-05 PROCEDURE — G1010 CDSM STANSON: HCPCS

## 2024-04-05 PROCEDURE — 71046 X-RAY EXAM CHEST 2 VIEWS: CPT

## 2024-04-05 PROCEDURE — 87637 SARSCOV2&INF A&B&RSV AMP PRB: CPT | Performed by: EMERGENCY MEDICINE

## 2024-04-05 PROCEDURE — 250N000013 HC RX MED GY IP 250 OP 250 PS 637: Performed by: EMERGENCY MEDICINE

## 2024-04-05 PROCEDURE — 93005 ELECTROCARDIOGRAM TRACING: CPT

## 2024-04-05 PROCEDURE — 80053 COMPREHEN METABOLIC PANEL: CPT | Performed by: EMERGENCY MEDICINE

## 2024-04-05 PROCEDURE — 250N000011 HC RX IP 250 OP 636: Performed by: PHYSICIAN ASSISTANT

## 2024-04-05 PROCEDURE — 99285 EMERGENCY DEPT VISIT HI MDM: CPT | Mod: 25

## 2024-04-05 PROCEDURE — 120N000001 HC R&B MED SURG/OB

## 2024-04-05 PROCEDURE — 84484 ASSAY OF TROPONIN QUANT: CPT | Performed by: EMERGENCY MEDICINE

## 2024-04-05 PROCEDURE — 250N000013 HC RX MED GY IP 250 OP 250 PS 637: Performed by: PHYSICIAN ASSISTANT

## 2024-04-05 PROCEDURE — 83605 ASSAY OF LACTIC ACID: CPT | Performed by: EMERGENCY MEDICINE

## 2024-04-05 RX ORDER — AZITHROMYCIN 500 MG/1
500 INJECTION, POWDER, LYOPHILIZED, FOR SOLUTION INTRAVENOUS ONCE
Status: DISCONTINUED | OUTPATIENT
Start: 2024-04-05 | End: 2024-04-05

## 2024-04-05 RX ORDER — AZITHROMYCIN 500 MG/1
500 INJECTION, POWDER, LYOPHILIZED, FOR SOLUTION INTRAVENOUS EVERY 24 HOURS
Status: DISCONTINUED | OUTPATIENT
Start: 2024-04-05 | End: 2024-04-10 | Stop reason: HOSPADM

## 2024-04-05 RX ORDER — BACLOFEN 10 MG/1
10 TABLET ORAL 2 TIMES DAILY
Status: DISCONTINUED | OUTPATIENT
Start: 2024-04-06 | End: 2024-04-10 | Stop reason: HOSPADM

## 2024-04-05 RX ORDER — AMOXICILLIN 250 MG
2 CAPSULE ORAL 2 TIMES DAILY PRN
Status: DISCONTINUED | OUTPATIENT
Start: 2024-04-05 | End: 2024-04-10 | Stop reason: HOSPADM

## 2024-04-05 RX ORDER — ARMODAFINIL 50 MG/1
200 TABLET ORAL EVERY MORNING
Status: DISCONTINUED | OUTPATIENT
Start: 2024-04-06 | End: 2024-04-10 | Stop reason: HOSPADM

## 2024-04-05 RX ORDER — CALCIUM CARBONATE 500 MG/1
1000 TABLET, CHEWABLE ORAL 4 TIMES DAILY PRN
Status: DISCONTINUED | OUTPATIENT
Start: 2024-04-05 | End: 2024-04-10 | Stop reason: HOSPADM

## 2024-04-05 RX ORDER — IOPAMIDOL 755 MG/ML
58 INJECTION, SOLUTION INTRAVASCULAR ONCE
Status: COMPLETED | OUTPATIENT
Start: 2024-04-05 | End: 2024-04-05

## 2024-04-05 RX ORDER — LEVOTHYROXINE SODIUM 100 UG/1
100 TABLET ORAL
Status: DISCONTINUED | OUTPATIENT
Start: 2024-04-06 | End: 2024-04-10 | Stop reason: HOSPADM

## 2024-04-05 RX ORDER — POLYETHYLENE GLYCOL 3350 17 G/17G
17 POWDER, FOR SOLUTION ORAL DAILY
Status: DISCONTINUED | OUTPATIENT
Start: 2024-04-06 | End: 2024-04-10 | Stop reason: HOSPADM

## 2024-04-05 RX ORDER — ACETAMINOPHEN 650 MG/1
650 SUPPOSITORY RECTAL EVERY 4 HOURS PRN
Status: DISCONTINUED | OUTPATIENT
Start: 2024-04-05 | End: 2024-04-10 | Stop reason: HOSPADM

## 2024-04-05 RX ORDER — AMOXICILLIN 250 MG
1 CAPSULE ORAL 2 TIMES DAILY PRN
Status: DISCONTINUED | OUTPATIENT
Start: 2024-04-05 | End: 2024-04-10 | Stop reason: HOSPADM

## 2024-04-05 RX ORDER — LOSARTAN POTASSIUM 50 MG/1
50 TABLET ORAL AT BEDTIME
Status: DISCONTINUED | OUTPATIENT
Start: 2024-04-05 | End: 2024-04-10 | Stop reason: HOSPADM

## 2024-04-05 RX ORDER — FAMOTIDINE 20 MG/1
40 TABLET, FILM COATED ORAL AT BEDTIME
Status: DISCONTINUED | OUTPATIENT
Start: 2024-04-05 | End: 2024-04-10 | Stop reason: HOSPADM

## 2024-04-05 RX ORDER — BACLOFEN 10 MG/1
10 TABLET ORAL DAILY PRN
Status: DISCONTINUED | OUTPATIENT
Start: 2024-04-05 | End: 2024-04-05

## 2024-04-05 RX ORDER — LOSARTAN POTASSIUM 25 MG/1
25 TABLET ORAL EVERY MORNING
Status: DISCONTINUED | OUTPATIENT
Start: 2024-04-06 | End: 2024-04-10 | Stop reason: HOSPADM

## 2024-04-05 RX ORDER — GABAPENTIN 300 MG/1
900 CAPSULE ORAL 2 TIMES DAILY
Status: DISCONTINUED | OUTPATIENT
Start: 2024-04-05 | End: 2024-04-10 | Stop reason: HOSPADM

## 2024-04-05 RX ORDER — BACLOFEN 20 MG/1
40 TABLET ORAL AT BEDTIME
Status: DISCONTINUED | OUTPATIENT
Start: 2024-04-05 | End: 2024-04-10 | Stop reason: HOSPADM

## 2024-04-05 RX ORDER — ONDANSETRON 2 MG/ML
4 INJECTION INTRAMUSCULAR; INTRAVENOUS EVERY 6 HOURS PRN
Status: DISCONTINUED | OUTPATIENT
Start: 2024-04-05 | End: 2024-04-10 | Stop reason: HOSPADM

## 2024-04-05 RX ORDER — POLYETHYLENE GLYCOL 3350 17 G/17G
17 POWDER, FOR SOLUTION ORAL 2 TIMES DAILY PRN
Status: DISCONTINUED | OUTPATIENT
Start: 2024-04-05 | End: 2024-04-10 | Stop reason: HOSPADM

## 2024-04-05 RX ORDER — SODIUM CHLORIDE 9 MG/ML
INJECTION, SOLUTION INTRAVENOUS CONTINUOUS
Status: DISCONTINUED | OUTPATIENT
Start: 2024-04-05 | End: 2024-04-06

## 2024-04-05 RX ORDER — ACETAMINOPHEN 325 MG/1
650 TABLET ORAL ONCE
Status: COMPLETED | OUTPATIENT
Start: 2024-04-05 | End: 2024-04-05

## 2024-04-05 RX ORDER — ONDANSETRON 4 MG/1
4 TABLET, ORALLY DISINTEGRATING ORAL EVERY 6 HOURS PRN
Status: DISCONTINUED | OUTPATIENT
Start: 2024-04-05 | End: 2024-04-10 | Stop reason: HOSPADM

## 2024-04-05 RX ORDER — ACYCLOVIR 400 MG/1
400 TABLET ORAL EVERY 12 HOURS SCHEDULED
Status: DISCONTINUED | OUTPATIENT
Start: 2024-04-05 | End: 2024-04-10 | Stop reason: HOSPADM

## 2024-04-05 RX ORDER — CEFTRIAXONE 2 G/1
2 INJECTION, POWDER, FOR SOLUTION INTRAMUSCULAR; INTRAVENOUS ONCE
Status: DISCONTINUED | OUTPATIENT
Start: 2024-04-05 | End: 2024-04-05

## 2024-04-05 RX ORDER — CEFTRIAXONE 2 G/1
2 INJECTION, POWDER, FOR SOLUTION INTRAMUSCULAR; INTRAVENOUS EVERY 24 HOURS
Status: DISCONTINUED | OUTPATIENT
Start: 2024-04-05 | End: 2024-04-10 | Stop reason: HOSPADM

## 2024-04-05 RX ORDER — AMOXICILLIN 250 MG
2 CAPSULE ORAL 2 TIMES DAILY
Status: DISCONTINUED | OUTPATIENT
Start: 2024-04-05 | End: 2024-04-10 | Stop reason: HOSPADM

## 2024-04-05 RX ORDER — ACETAMINOPHEN 325 MG/1
650 TABLET ORAL EVERY 4 HOURS PRN
Status: DISCONTINUED | OUTPATIENT
Start: 2024-04-05 | End: 2024-04-10 | Stop reason: HOSPADM

## 2024-04-05 RX ORDER — AMOXICILLIN 250 MG
1 CAPSULE ORAL 2 TIMES DAILY
Status: DISCONTINUED | OUTPATIENT
Start: 2024-04-05 | End: 2024-04-10 | Stop reason: HOSPADM

## 2024-04-05 RX ORDER — LIDOCAINE 40 MG/G
CREAM TOPICAL
Status: DISCONTINUED | OUTPATIENT
Start: 2024-04-05 | End: 2024-04-10 | Stop reason: HOSPADM

## 2024-04-05 RX ORDER — GUAIFENESIN 600 MG/1
1200 TABLET, EXTENDED RELEASE ORAL 2 TIMES DAILY
Status: DISCONTINUED | OUTPATIENT
Start: 2024-04-05 | End: 2024-04-10 | Stop reason: HOSPADM

## 2024-04-05 RX ORDER — BACLOFEN 10 MG/1
40 TABLET ORAL AT BEDTIME
COMMUNITY

## 2024-04-05 RX ORDER — BISACODYL 10 MG
10 SUPPOSITORY, RECTAL RECTAL DAILY PRN
Status: DISCONTINUED | OUTPATIENT
Start: 2024-04-05 | End: 2024-04-10 | Stop reason: HOSPADM

## 2024-04-05 RX ADMIN — GABAPENTIN 900 MG: 300 CAPSULE ORAL at 22:23

## 2024-04-05 RX ADMIN — IOPAMIDOL 58 ML: 755 INJECTION, SOLUTION INTRAVENOUS at 17:27

## 2024-04-05 RX ADMIN — LOSARTAN POTASSIUM 50 MG: 50 TABLET, FILM COATED ORAL at 22:24

## 2024-04-05 RX ADMIN — DOCUSATE SODIUM 50 MG AND SENNOSIDES 8.6 MG 1 TABLET: 8.6; 5 TABLET, FILM COATED ORAL at 22:23

## 2024-04-05 RX ADMIN — SODIUM CHLORIDE: 9 INJECTION, SOLUTION INTRAVENOUS at 21:44

## 2024-04-05 RX ADMIN — FAMOTIDINE 40 MG: 20 TABLET, FILM COATED ORAL at 22:24

## 2024-04-05 RX ADMIN — GUAIFENESIN 1200 MG: 600 TABLET, EXTENDED RELEASE ORAL at 22:24

## 2024-04-05 RX ADMIN — CEFTRIAXONE SODIUM 2 G: 2 INJECTION, POWDER, FOR SOLUTION INTRAMUSCULAR; INTRAVENOUS at 22:22

## 2024-04-05 RX ADMIN — ACYCLOVIR 400 MG: 400 TABLET ORAL at 22:27

## 2024-04-05 RX ADMIN — ACETAMINOPHEN 650 MG: 325 TABLET, FILM COATED ORAL at 15:22

## 2024-04-05 RX ADMIN — BACLOFEN 40 MG: 20 TABLET ORAL at 22:26

## 2024-04-05 RX ADMIN — SODIUM CHLORIDE 85 ML: 9 INJECTION, SOLUTION INTRAVENOUS at 17:28

## 2024-04-05 ASSESSMENT — ACTIVITIES OF DAILY LIVING (ADL)
ADLS_ACUITY_SCORE: 43
ADLS_ACUITY_SCORE: 60

## 2024-04-05 ASSESSMENT — COLUMBIA-SUICIDE SEVERITY RATING SCALE - C-SSRS
6. HAVE YOU EVER DONE ANYTHING, STARTED TO DO ANYTHING, OR PREPARED TO DO ANYTHING TO END YOUR LIFE?: NO
2. HAVE YOU ACTUALLY HAD ANY THOUGHTS OF KILLING YOURSELF IN THE PAST MONTH?: NO
1. IN THE PAST MONTH, HAVE YOU WISHED YOU WERE DEAD OR WISHED YOU COULD GO TO SLEEP AND NOT WAKE UP?: NO

## 2024-04-05 NOTE — TELEPHONE ENCOUNTER
Armida, Home Care RN calling with abnormal vitals and sx concerns  Patient recently started on Cipro 7 day course for UTI, has taken 4 doses  Patient has developed a fever, moist cough, headache, and sore throat  Temp this morning was 103.7 - patient took cold med containing 650mg acetaminophen which brought temp down to 101.8, O2 sats mid-upper 80s, tachy 110s  AOx4 but lethargic    Patient/family preference is to be seen in [facility] if possible given her mobility issues. They have been seen at [place] in the past. RN calling to see if [facility] feels appropriate/willing to accept for further labs/cultures, fluids, assessment, etc.    [facility] in agreement to see patient today at 1130. Patient/spouse in agreement and home care RN monitoring in interim.    SAEID CarnesN, RN  [facility]      Reason for Disposition   MODERATE difficulty breathing (e.g., speaks in phrases, SOB even at rest, pulse 100-120)   Fever > 103 F  (39.4 C)    Additional Information   Negative: Sinus infection and taking an antibiotic   Negative: Wound infection and taking an antibiotic   Negative: SEVERE difficulty breathing (e.g., struggling for each breath, speaks in single words)   Negative: Sounds like a life-threatening emergency to the triager    Protocols used: Infection on Antibiotic Follow-up Call-A-[place]

## 2024-04-05 NOTE — ED PROVIDER NOTES
History     Chief Complaint:  Fever, Cough, Hypertension, and Nausea       HPI   Marylee Woods is a 68 year old female with a history of MS who presents to the emergency department for persistent dry cough. The patient mentions some leg edema at baseline, managing with compression stockings. She notes additional complaints of fever and elevated blood pressure. The patient mentions oxygen in the 80s this morning when visited by home health provider. Marylee denies a history of heart failure, chest pain, prior blood clots, asthma or COPD. Of note, the patient mentions possible exposure to influenza during a family [employer] gathering.    Independent Historian:   The patient is present with her  in the exam room, contributing to the history as described above.    Review of External Notes:   Clinic note from 4/3/2024 reviewed.  Seen for annual health maintenance.  Was noted to be in the midst of an episode of recurrent major depressive disorder.  Also had cystitis with hematuria and was placed on ciprofloxacin and nitrofurantoin.  Losartan was increased for chronic hypertension.  Had breast pain with history of breast cancer, mammogram ordered at that time.      Medications:    Zovirax  Armodafinil  Lioresal  Ciprofloxacin  Cymbalta  Pepcid  Neurontin  Synthroid  Cozaar  Macrobid    Past Medical History:    Past Medical History:   Diagnosis Date    Acute cystitis without hematuria     Acute pain of right knee     Atypical ductal hyperplasia of left breast 02/2022    Candida esophagitis - 2019 (H)     CKD (chronic kidney disease) stage 3, GFR 30-59 ml/min (H)     Community acquired pneumonia, unspecified laterality     Depression     Epigastric pain     Former tobacco use     Fracture of femur, distal, left, closed (H)     Gastro-oesophageal reflux disease     Graves disease     History of fracture of fibula     History of tibial fracture     HSV (herpes simplex virus) infection     Hyperlipidemia with target LDL  less than 130     Hyponatremia     L tib fx s/p IM nailing     Lung nodules     Multiple sclerosis (H)     Osteoporosis     Postablative hypothyroidism        Past Surgical History:    Past Surgical History:   Procedure Laterality Date    C/SECTION, LOW TRANSVERSE  1992    COLONOSCOPY  2007    COLONOSCOPY N/A 1/26/2017    Procedure: COMBINED COLONOSCOPY, SINGLE OR MULTIPLE BIOPSY/POLYPECTOMY BY BIOPSY;  Surgeon: Mayo Adkins MD, MD;  Location:  GI    ESOPHAGOSCOPY, GASTROSCOPY, DUODENOSCOPY (EGD), COMBINED  1/26/2017    Dr. Adkins [facility]    ESOPHAGOSCOPY, GASTROSCOPY, DUODENOSCOPY (EGD), COMBINED N/A 1/26/2017    Procedure: COMBINED ESOPHAGOSCOPY, GASTROSCOPY, DUODENOSCOPY (EGD), BIOPSY SINGLE OR MULTIPLE;  Surgeon: Mayo Adkins MD, MD;  Location:  GI    ESOPHAGOSCOPY, GASTROSCOPY, DUODENOSCOPY (EGD), COMBINED N/A 4/20/2023    Procedure: ESOPHAGOGASTRODUODENOSCOPY, WITH BIOPSY;  Surgeon: Cristina Zuniga MD;  Location: [facility]    HIP SURGERY  2009    femur ortho surgery    LAPAROSCOPIC CHOLECYSTECTOMY  6/24/2014    Procedure: LAPAROSCOPIC CHOLECYSTECTOMY;  Surgeon: Randy Bailey MD;  Location:  [facility]    LUMPECTOMY BREAST Left 3/31/2022    Procedure: LEFT Radiofrequency Identification Seed-Localized Lumpectomy;  Surgeon: Amanda Liu MD;  [place] [facility] [place]    OPEN REDUCTION INTERNAL FIXATION FEMUR DISTAL Left 11/1/2018    Procedure: OPEN REDUCTION INTERNAL FIXATION LEFT FEMUR DISTAL;  Surgeon: Jose Valadez MD;  Location: [facility] OR    OPEN REDUCTION INTERNAL FIXATION RODDING INTRAMEDULLARY TIBIA  4/14/2013    Procedure: OPEN REDUCTION INTERNAL FIXATION RODDING INTRAMEDULLARY TIBIA;;  Surgeon: Sajan Cast MD;  Location: [facility] OR    ORTHOPEDIC SURGERY  2009    surgery right upper femur fx near hip        Physical Exam   Patient Vitals for the past 24 hrs:   BP Temp Temp src Pulse Resp SpO2 Height Weight   04/05/24 2044 131/64 98.4  F (36.9  C) Oral 82 18 93 % -- --   04/05/24  "1700 -- -- -- -- -- 95 % -- --   04/05/24 1626 (!) 151/68 -- -- 92 20 92 % -- --   04/05/24 1615 -- -- -- -- -- 94 % -- --   04/05/24 1610 -- -- -- -- -- (!) 89 % -- --   04/05/24 1440 -- -- -- -- -- 92 % -- --   04/05/24 1435 -- -- -- -- -- 93 % -- --   04/05/24 1330 -- -- -- -- -- 92 % -- --   04/05/24 1301 126/55 98.7  F (37.1  C) Temporal 100 22 90 % 1.727 m (5' 8\") 63.5 kg (140 lb)        Physical Exam  General: Sitting on the ED wheelchair  HEENT: Normocephalic, atraumatic  Cardiac: Warm and well perfused, regular rate and rhythm  Pulm: Breathing comfortably, no accessory muscle usage, no conversational dyspnea, and lungs clear bilaterally  GI: Abdomen soft, nontender, no rigidity or guarding  MSK: No bony deformities  Skin: Warm and dry  Neuro: Moves all extremities  Psych: Pleasant mood and affect      Emergency Department Course   ECG  ECG taken at 1258, ECG read at 1400  Normal sinus  No significant change as compared to prior, dated 3/15/24.  Rate 93 bpm. DE interval 156 ms. QRS duration 80 ms. QT/QTc 344/427 ms. P-R-T axes 84 33 66.     Imaging:  CT Chest Pulmonary Embolism w Contrast   Final Result   IMPRESSION:   1.  No pulmonary emboli.   2.  Inflammatory bronchial wall thickening with scattered endoluminal mucous plugs and mild atelectasis.      XR Chest 2 Views   Final Result   IMPRESSION: No acute cardiopulmonary disease.      SHI SEWELL MD            SYSTEM ID:  [id number]             Laboratory:  Labs Ordered and Resulted from Time of ED Arrival to Time of ED Departure   TROPONIN T, HIGH SENSITIVITY - Abnormal       Result Value    Troponin T, High Sensitivity 34 (*)    CBC WITH PLATELETS AND DIFFERENTIAL - Abnormal    WBC Count 6.0      RBC Count 3.50 (*)     Hemoglobin 10.7 (*)     Hematocrit 33.0 (*)     MCV 94      MCH 30.6      MCHC 32.4      RDW 12.0      Platelet Count 178      % Neutrophils 81      % Lymphocytes 14      % Monocytes 4      % Eosinophils 1      % Basophils 0      % " Immature Granulocytes 0      NRBCs per 100 WBC 0      Absolute Neutrophils 4.8      Absolute Lymphocytes 0.9      Absolute Monocytes 0.3      Absolute Eosinophils 0.0      Absolute Basophils 0.0      Absolute Immature Granulocytes 0.0      Absolute NRBCs 0.0     TROPONIN T, HIGH SENSITIVITY - Abnormal    Troponin T, High Sensitivity 34 (*)    D DIMER QUANTITATIVE - Abnormal    D-Dimer Quantitative 1.12 (*)    INFLUENZA A/B, RSV, & SARS-COV2 PCR - Normal    Influenza A PCR Negative      Influenza B PCR Negative      RSV PCR Negative      SARS CoV2 PCR Negative     COMPREHENSIVE METABOLIC PANEL - Normal    Sodium 138      Potassium 4.0      Carbon Dioxide (CO2) 26      Anion Gap 12      Urea Nitrogen 14.6      Creatinine 0.84      GFR Estimate 75      Calcium 9.1      Chloride 100      Glucose 96      Alkaline Phosphatase 89      AST 22      ALT 12      Protein Total 7.8      Albumin 4.1      Bilirubin Total 0.2     LACTIC ACID WHOLE BLOOD - Normal    Lactic Acid 0.9          Procedures   None    Emergency Department Course & Assessments:    Interventions:  Medications   acyclovir (ZOVIRAX) tablet 400 mg (has no administration in time range)   armodafinil (NUVIGIL) tablet TABS 200 mg (has no administration in time range)   baclofen (LIORESAL) tablet 10 mg (has no administration in time range)   baclofen (LIORESAL) tablet 40 mg (has no administration in time range)   DULoxetine (CYMBALTA) DR capsule 80 mg (has no administration in time range)   famotidine (PEPCID) tablet 40 mg (has no administration in time range)   gabapentin (NEURONTIN) capsule 900 mg (has no administration in time range)   levothyroxine (SYNTHROID/LEVOTHROID) tablet 100 mcg (has no administration in time range)   losartan (COZAAR) tablet 25 mg (has no administration in time range)   losartan (COZAAR) tablet 50 mg (has no administration in time range)   polyethylene glycol (MIRALAX) powder 17 g (has no administration in time range)   lidocaine 1 %  0.1-1 mL (has no administration in time range)   lidocaine (LMX4) cream (has no administration in time range)   sodium chloride (PF) 0.9% PF flush 3 mL (has no administration in time range)   sodium chloride (PF) 0.9% PF flush 3 mL (has no administration in time range)   acetaminophen (TYLENOL) tablet 650 mg (has no administration in time range)     Or   acetaminophen (TYLENOL) Suppository 650 mg (has no administration in time range)   senna-docusate (SENOKOT-S/PERICOLACE) 8.6-50 MG per tablet 1 tablet (has no administration in time range)     Or   senna-docusate (SENOKOT-S/PERICOLACE) 8.6-50 MG per tablet 2 tablet (has no administration in time range)   polyethylene glycol (MIRALAX) Packet 17 g (has no administration in time range)   bisacodyl (DULCOLAX) suppository 10 mg (has no administration in time range)   ondansetron (ZOFRAN ODT) ODT tab 4 mg (has no administration in time range)     Or   ondansetron (ZOFRAN) injection 4 mg (has no administration in time range)   calcium carbonate (TUMS) chewable tablet 1,000 mg (has no administration in time range)   senna-docusate (SENOKOT-S/PERICOLACE) 8.6-50 MG per tablet 1 tablet (has no administration in time range)     Or   senna-docusate (SENOKOT-S/PERICOLACE) 8.6-50 MG per tablet 2 tablet (has no administration in time range)   cefTRIAXone (ROCEPHIN) 2 g vial to attach to  ml bag for ADULTS or NS 50 ml bag for PEDS (has no administration in time range)   azithromycin (ZITHROMAX) 500 mg vial to attach to  mL bag (has no administration in time range)   guaiFENesin (MUCINEX) 12 hr tablet 1,200 mg (has no administration in time range)   sodium chloride 0.9 % infusion (has no administration in time range)   acetaminophen (TYLENOL) tablet 650 mg (650 mg Oral $Given 4/5/24 1522)   Saline Flush - CT (85 mLs Intravenous $Given 4/5/24 1728)   iopamidol (ISOVUE-370) solution 58 mL (58 mLs Intravenous $Given 4/5/24 0691)        Assessments:  ED Course as of 04/05/24  2122   1352 I saw the patient for an initial evaluation and exam.        Independent Interpretation (X-rays, CTs, rhythm strip):  Chest x-ray on my review is clear without lobar infiltrate, pneumothorax, large pleural effusion, or significant edema.     Consultations/Discussion of Management or Tests:  I discussed the patient with Demetri Hein PA-C, PA-C who accepts the patient for Dr. Miller, hospitalist.      Social Determinants of Health affecting care:   None    Disposition:  The patient was admitted to the hospital under the care of Dr. Miller.     Impression & Plan    CMS Diagnoses: None      MIPS (If applicable):  N/A    Medical Decision Makin-year-old female with with history as above presents with cough, recent influenza exposure.  Also reportedly febrile and hypoxemic at home this morning.  Borderline saturations on room air here.  Afebrile.  Viral multiplex negative.  D-dimer was positive.  CT PE shows no evidence of pulm embolism but does show findings consistent with bronchitis.  Other lab work shows a mild troponin elevation which in this case I think represents type II demand ischemia.  The patient's oxygen saturation dropped as low as 85% in the ED.  Overall picture could be bronchitis with mucous plugging, but given the patient's fever at home, hypoxemia here, covering for community-acquired pneumonia as well.  Plan is for admission to the hospitalist service for further care.      Diagnosis:    ICD-10-CM    1. Cough, unspecified type  R05.9       2. Hypoxemia  R09.02          Scribe Disclosure:  I, Hans Rush, am serving as a scribe at 1:25 PM on 2024 to document services personally performed by Gavin England MD based on my observations and the provider's statements to me.        Gavin England MD  24

## 2024-04-05 NOTE — ED TRIAGE NOTES
Pt has been sick lately with a cough, fever and nausea for the past week   Pt was told she had low 02

## 2024-04-05 NOTE — PROGRESS NOTES
Assessment & Plan      Diagnosis Comments   1. Acute cough        2. Fever, unspecified fever cause        3. Low oxygen saturation        4. On supplemental oxygen by nasal cannula      placed in clinic      5. Ductal carcinoma in situ (DCIS) of left breast        6. Treatment-emergent central sleep apnea and Hypersomnia - followed by Dr Say MCCLURE [facility] 2024        7. Neurogenic bladder        8. Recurrent UTI          Discussed concerns related to low oxygenation that rebounded well with nasal cannula 2 L. Due to patient history of MS places her increased risk of infection, she is also being treated for UTI as noted.   Discussed in detail symptoms that would warrant emergent evaluation in the ED. Patient agrees with plan and will follow up as needed.     DDX: ARDS, Pneumonia, influenza, covid or other viral uri.     30 minutes spent on the date of the encounter doing chart review, review of outside records, review of test results, interpretation of tests, patient visit, documentation, and discussion with family       Subjective   Marylee is a 68 year old, presenting for the following health issues:    Acute Illness  Acute illness concerns: Cough, Low O2, high fever and BP  Onset/Duration: Couple nights ago  Symptoms:  Fever: YES  Chills/Sweats: YES, sweats  Headache (location?): YES, frontal  Sinus Pressure: YES           Decreased energy level: YES  Conjunctivitis:  No  Ear Pain: no  Rhinorrhea: No  Congestion: YES  Sore Throat: YES  Cough: YES-non-productive, waxing and waning over time  Wheeze: No           Breathing fast: No  Decreased Appetite/Intake: YES  Nausea: YES  Vomiting: No  Diarrhea: No  Genitourinary symptoms: YES, UTI  Progression of symptoms: same waxing and waning, worse at night when laying down  Sick/Strep Exposure: Children who was sick before - on Sunday  Therapies tried and outcome: Dayquil, tylenol      Patient vitally unstable with oxygen saturation low mid 80's cough and elevated  fever at home (103 max) she states cough is productive. States her family has exposed her to influenza and pneumonia. Her symptoms started 3 days ago. Patient has history of MS, she is currently being treated for a UTI with Ciprofloxacin on day 4.      Oxygen applied in clinic via NC saturation increased to 94%.     Patient spouse is reluctant to take wife to ER which is my recommendation due to patient history and inability to maintain her oxygenation.       Review of Systems  Constitutional, HEENT, cardiovascular, pulmonary, gi and gu systems are negative, except as otherwise noted.      Objective    /73 (BP Location: Right arm, Patient Position: Sitting, Cuff Size: Adult Regular)   Pulse 109   Temp 98  F (36.7  C) (Oral)   Resp 22   LMP 01/31/2004 (Approximate)   SpO2 (!) 85%   There is no height or weight on file to calculate BMI.  [unfilled]   GENERAL: no distress and fatigued  EYES: Eyes grossly normal to inspection, PERRL and conjunctivae and sclerae normal  HENT: ear canals and TM's normal, nose and mouth without ulcers or lesions  NECK: no adenopathy, no asymmetry, masses, or scars  RESP: lungs clear to auscultation - no rales, rhonchi or wheezes  CV: regular rate and rhythm, normal S1 S2, no S3 or S4, no murmur, click or rub, no peripheral edema  MS: no gross musculoskeletal defects noted, no edema  SKIN: no suspicious lesions or rashes            Signed Electronically by: SWAPNIL Neal CNP

## 2024-04-05 NOTE — H&P
[facility]  History and Physical - Hospitalist Service       Date of Admission:  4/5/2024  PRIMARY CARE PROVIDER:    Carolina Pulliam    Assessment & Plan   Marylee Woods is a 68 year old female admitted on 4/5/2024 due to acute hypoxic respiratory failure secondary to acute bronchitis.      Past medical history significant for Current E. Coli UTI with history of reoccurrence (on day 4 of Cipro), MS, Neurogenic bladder, Urinary incontinence, CKD stage 3, HTN, HLP, GERD, Postablative hypothyroidism, HSV, Osteopenia, MDD with anxiety, Central sleep apnea, Closed pressure injury of the sacrum, Lymphedema, Known lung nodules, History of left sided breast cancer.      Discussed with Tomás and reviewed ED notes.  Patient presented to the ED after being seen [facility] where she initially presented due to acute illness and not feeling well.  Patient had reported having a cough, high fever, elevated blood pressure and low O2 saturations that began 3 nights prior to presentation.      Per Clinic note, O2 saturations were reported in the low-mid 80's, productive coughing and a tempt as high as 103.  She reported having an exposure to influenza and pneumonia.  She was placed on 2 L/m of supplemental O2 and advised/sent to the ED.      Work-up in the ED included an EKG that showed normal sinus rhythm.  CMP that was unremarkable.  CBC with diff that revealed a HGB of 10.7, Hematocrit of 33, RBC Count of 3.50 otherwise within normal limits.  Respiratory viral PCR panel was negative.  Troponin T was elevated at 34 and when repeated remained at 34.  D-Dimer was elevated at 1.12.  Lactic acid level was within normal limits at 0.9.  2 view Chest Xray was negative for acute cardiopulmonary disease.  Chest CT/PE study with contrast was negative for PE but revealed inflammatory bronchial wall thickening with scattered endoluminal mucous plugs and mild atelectasis.      While in the ED  patient was noted to be hypoxic with a O2 saturation of 85% on RA which improved following placement of 1 L/m of supplemental O2.  She otherwise remained afebrile and HR remained in the 80-90's.  Blood pressure was initially within normal limits at 126/55 but increased on repeat to 151/68.      Patient received PO APAP 650 mg while in the ED.      Acute hypoxic respiratory failure  *Baseline patient does not require supplemental O2.  Currently requiring 1-2 L/m per nasal cannula.    - Supplemental O2 available; wean as able.    - Encourage use of IS/Flutter valve.    - Continue antibiotic management as below.    - RCAT consult requested.      Acute bronchitis  *With acute hypoxic respiratory failure with reported fevers (Max of 103) and persistent productive cough will start IV antibiotics.    - IV Ceftriaxone 1 g/d.    - IV Azithromycin 500 mg/d.    - Mucinex 1200 mg BID.    - Encourage PO fluids.    - IV fluids with NS at 75 ml/hr.      E. Coli UTI   History of recurrent UTI  *UCx from 4/2 was sensitive to everything but ampicillin.    - Stop PTA Cipro in favor of IV antibiotics above.        Closed sacral pressure injury  - WOC Nurse consult requested.    - Low air flow mattress ordered.    - Patient care order in place to reposition patient every 2 hours while awake.      Troponin elevation  *Troponin elevated at 34 and on repeat remained at 34.  Patient denied chest pain.  EKG with normal sinus rhythm.  No further work-up at this time.      MS  *Wheelchair bound at baseline.    - Resumed on PTA baclofen (20 mg every morning, 10 mg every afternoon as needed and 30 mg at bedtime).  - Resumed on PTA gabapentin 600 mg QID.    - Hold PTA BRIUMVI injection and resume at discharge.      Neurogenic bladder  Urinary incontinence  *Noted on chart review.      Chronic kidney disease stage 3  Baseline creatinine 0.5-0.9.      HTN  - Resumed on PTA losartan 25 mg every morning and 50 mg every evening.  Hold parameters in  place.    - PRN IV hydralazine 10 mg every 4 hours for SBP GREATER THAN 180.      HLP  *Noted on chart review.  Not currently on any medications.      GERD  - Resumed on PTA Pepcid 40 mg at bedtime.        Postablative hypothyroidism  - Resumed on PTA synthroid 100 mcg (takes 6 times a week).      Lymphedema  *Utilizes compression stocking at home and working with home health.    - PCD's ordered.      HSV  - Resumed on PTA acyclovir 400 mg every 12 hours.      Osteopenia  - Hold PTA supplements and resume at discharge.      Major depressive disorder with anxiety  - Resumed on PTA Cymbalta 80 mg/d.    Central sleep apnea with day time drowsiness  - B- BiPAP ordered with home settings.    - Resumed on PTA Armodafinil 200 mg every morning.      Known lung nodules  *Noted on chart review but not reported on Chest CT/PE study completed in the ED.  Recommend continued follow-up with PCP.      History of left sided breast cancer  *S/p lumpectomy and radiation therapy.  No interventions.      Clinically Significant Risk Factors Present on Admission                  # Hypertension: Noted on problem list          # Financial/Environmental Concerns:                Diet: Regular diet  DVT Prophylaxis: Pneumatic Compression Devices  Verduzco Catheter: Not present  Lines: None     Cardiac Monitoring: None  Code Status: FULL CODE; confirmed with the patient          Disposition Plan   Inpatient status.  Anticipate greater than 2 evening hospitalization while undergoing continued work-up/management of acute hypoxic respiratory failure with acute bronchitis.      The patient's care was discussed with the Patient, Patient's Family, and Dr. England .    The patient has been discussed with Dr. Miller, who agrees with the assessment and plan at this time.    Ramon Hein PA-C  [facility]  Securely message with the Vocera Web Console (learn more here)  Text page via u.sit  Paging/Directory    ______________________________________________________________________    Chief Complaint   Hypoxic, fevers and productive cough     History is obtained from Dr. England, the patient and EMR.      History of Present Illness   Marylee Woods is a 68 year old female admitted on 4/5/2024 due to acute hypoxic respiratory failure secondary to acute bronchitis.      Past medical history significant for Current E. Coli UTI with history of reoccurrence (on day 4 of Cipro), MS, Neurogenic bladder, Urinary incontinence, CKD stage 3, HTN, HLP, GERD, Postablative hypothyroidism, HSV, Osteopenia, MDD with anxiety, Central sleep apnea, Closed pressure injury of the sacrum, Lymphedema, Known lung nodules, History of left sided breast cancer.      Discussed with  and reviewed ED notes.  Patient presented to the ED after being seen at  [facility] where she initially presented due to acute illness and not feeling well.  Patient had reported having a cough, high fever, elevated blood pressure and low O2 saturations that began 3 nights prior to presentation.      Per Clinic note, O2 saturations were reported in the low-mid 80's, productive coughing and a tempt as high as 103.  She reported having an exposure to influenza and pneumonia.  She was placed on 2 L/m of supplemental O2 and advised/sent to the ED.      Work-up in the ED included an EKG that showed normal sinus rhythm.  CMP that was unremarkable.  CBC with diff that revealed a HGB of 10.7, Hematocrit of 33, RBC Count of 3.50 otherwise within normal limits.  Respiratory viral PCR panel was negative.  Troponin T was elevated at 34 and when repeated remained at 34.  D-Dimer was elevated at 1.12.  Lactic acid level was within normal limits at 0.9.  2 view Chest Xray was negative for acute cardiopulmonary disease.  Chest CT/PE study with contrast was negative for PE but revealed inflammatory bronchial wall thickening with scattered endoluminal  mucous plugs and mild atelectasis.      While in the ED patient was noted to be hypoxic with a O2 saturation of 85% on RA which improved following placement of 1 L/m of supplemental O2.  She otherwise remained afebrile and HR remained in the 80-90's.  Blood pressure was initially within normal limits at 126/55 but increased on repeat to 151/68.      Patient received PO APAP 650 mg while in the ED.      Patient was seen in the ED with her  present in the room.  Initially, we medical history and home medications.  We then reviewed events that led to patient's presentation to the ED.    Upon questioning, patient has been experiencing fevers and chills for the last several nights and reported a temperature as high as 103  F.  She is also developed night sweats for the past 3 nights and indicated that she awakens most mornings drenched in sweat.  She has had some increased fatigue as well as some increase in weakness but indicated it is difficult to assess due to her baseline MS.  Patient has a closed pressure injury on her buttocks/sacrum and  indicated that a pulsatile or special mattress should be utilized and the patient should be repositioned every 2 hours.    Patient has described having a headache intermittently this past week.  She describes having some slight change in vision most mornings and difficulty with vision of her right eye but unclear if this is new for this past week or ongoing issue.  When patient is experiencing coughing fits she becomes nauseated and experiences chest palpitations.  Patient has chronic swelling of her legs but indicated that this is increased in the last 4 months prior to right femur fracture surgery.  Patient's voice is more raspy and she has been experiencing a sore throat due to her frequent coughing fits.  She has ongoing issues with constipation but seems to manage with MiraLAX and stool softeners and indicated that she primarily has 1 bowel movement a  "week.    Patient has ongoing issues with urinary incontinence for which she utilizes briefs.  She stated that coughing fits have increased the amount of incontinent episodes that are occurring.  We discussed and reviewed neurogenic bladder and she indicated she is not on any medication does not utilize catheters.  Patient indicated that she has ongoing right leg (hip and knee) pain/soreness as well as left ankle discomfort/pain.  Some mornings patient has been experiencing some lightheadedness or \"woozy\" feeling.  Due to her baseline MS she has numbness and tingling affecting multiple areas of her body but indicated that this is unchanged.    Patient resides in a house in [place], [place].  She resides there with her  2 cats and their daughter resides in the upper part of the house with her 2 cats.  Patient indicated she continues to smoke but indicated that she does not inhale and typically goes through 2 to 5 cigarettes a day but this past week is only had 2 cigarettes total.  Patient does consume alcohol mainly in social settings but not very frequently.  She seldomly/rarely utilizes marijuana.  She has been in a wheelchair for some time now.  And she is compliant with the BiPAP however has been unable to utilize this in the last week due to her frequent coughing fits.    Discussed and reviewed CODE STATUS and patient elected to be full code.      Past Medical History    I have reviewed this patient's medical history and updated it with pertinent information if needed.   Past Medical History:   Diagnosis Date    Acute cystitis without hematuria     Acute pain of right knee     Atypical ductal hyperplasia of left breast 02/2022    Candida esophagitis - 2019 (H)     CKD (chronic kidney disease) stage 3, GFR 30-59 ml/min (H)     Community acquired pneumonia, unspecified laterality     Depression     Epigastric pain     Former tobacco use     Fracture of femur, distal, left, closed (H)     " Gastro-oesophageal reflux disease     Graves disease     History of fracture of fibula     History of tibial fracture     HSV (herpes simplex virus) infection     Hyperlipidemia with target LDL less than 130     Hyponatremia     L tib fx s/p IM nailing     Lung nodules     Currently managed with follow up imaging by [facility] result Team.       Multiple sclerosis (H)     Osteoporosis     Postablative hypothyroidism    Current E. Coli UTI with history of reoccurrence (on day 4 of Cipro), MS, Neurogenic bladder, Urinary incontinence, CKD stage 3, HTN, HLP, GERD, Postablative hypothyroidism, HSV, Osteopenia, MDD with anxiety, Central sleep apnea, Closed pressure injury of the sacrum, Lymphedema, Known lung nodules, History of left sided breast cancer.      Prior to Admission Medications   Prior to Admission Medications   Prescriptions Last Dose Informant Patient Reported? Taking?   Armodafinil 200 MG TABS   Yes No   Sig: Take 200 mg by mouth every morning   DULoxetine (CYMBALTA) 20 MG capsule   No No   Sig: TAKE 1 CAPSULE BY MOUTH ONCE DAILY ALONG  WITH  A  60  MG  TABLET  FOR  A  TOTAL  DOSE  OF  80  MG  DAILY   DULoxetine (CYMBALTA) 60 MG capsule   No No   Sig: Take 1 capsule by mouth once daily   acetaminophen (TYLENOL) 500 MG tablet   No No   Sig: Take 1-2 tablets (500-1,000 mg) by mouth every 8 hours as needed for mild pain or fever (greater than 101 degrees)   acyclovir (ZOVIRAX) 400 MG tablet   No No   Sig: Take 1 tablet (400 mg) by mouth every 12 hours   baclofen (LIORESAL) 10 MG tablet   Yes No   Sig: Take 10-30 mg by mouth 2 times daily Take 2 tablets (20 MG) by mouth every morning, 1 tablet (10 MG) by mouth daily in the afternoon as needed, and 3 tablets (30 MG) by mouth every evening before bedtime.   calcium carbonate 500 mg, elemental, (OSCAL 500) 1250 (500 Ca) MG TABS tablet   Yes No   Sig: Take 2 tablets by mouth at bedtime   ciprofloxacin (CIPRO) 500 MG tablet   No No   Sig: Take 1  tablet (500 mg) by mouth 2 times daily for 7 days   famotidine (PEPCID) 40 MG tablet   No No   Sig: TAKE 1 TABLET BY MOUTH AT BEDTIME   gabapentin (NEURONTIN) 300 MG capsule   No No   Sig: Take 2 capsules (600 mg) by mouth 4 times daily   latanoprost (XALATAN) 0.005 % ophthalmic solution   Yes No   Sig: INSTILL 1 DROP INTO BOTH EYES EVERY DAY AS DIRECTED PT WILL NEED TO BE SEEN FOR FUTURE REFILLS   levothyroxine (SYNTHROID/LEVOTHROID) 100 MCG tablet   No No   Sig: Take 1 tablet (100 mcg) by mouth daily SIX TIMES A WEEK   losartan (COZAAR) 25 MG tablet   No No   Sig: Take 1 in the AM (25 mg) and 2 in the PM (50 mg)   multivitamin w/minerals (THERA-VIT-M) tablet   Yes No   Sig: Take 1 tablet by mouth every evening   nitroFURantoin macrocrystal-monohydrate (MACROBID) 100 MG capsule   No No   Sig: Take 1 capsule (100 mg) by mouth daily   oxyCODONE (ROXICODONE) 5 MG tablet   No No   Sig: Take 0.5 tablets (2.5 mg) by mouth every 6 hours as needed for moderate pain   polyethylene glycol (MIRALAX) 17 GM/Dose powder   No No   Sig: Take 17 g by mouth daily   ublituximab-xiiy (BRIUMVI) 150 MG/6ML injection   Yes No   Sig: Administer BRIUMVI 25mg IV Day 1 150mg, Day 15 450mg, 24 weeks from initial dose 450mg      Facility-Administered Medications: None     Allergies   Allergies   Allergen Reactions    Bactrim [Sulfamethoxazole-Trimethoprim] Hallucination    Hydrochlorothiazide      Hyponatremia    Sulfamethizole     Amantadine      hallucinations    Budesonide     Budesonide-Formoterol Fumarate Rash       Physical Exam   Vital Signs: Temp: 98.7  F (37.1  C) Temp src: Temporal BP: (!) 151/68 Pulse: 92   Resp: 20 SpO2: 95 % O2 Device: Nasal cannula Oxygen Delivery: 1 LPM  Weight: 140 lbs 0 oz    Constitutional: Awake, alert, cooperative, no apparent distress but did appear fatigued and ill.  ENT: Normocephalic, without obvious abnormality, atraumatic, oral pharynx with dry mucus membranes.  Eyes extra occular movements intact.   Normal sclera.    Neck: Supple, symmetrical, trachea midline, no adenopathy.  Pulmonary: No increased work of breathing, diminished breath sounds at the bases and expiratory coarse breath sounds/rhonchi present.  Deep breathing elicits frequent coughing fits.  Cardiovascular: Regular rate and rhythm, normal S1 and S2, no S3 or S4, and no murmur noted.  GI: Normal bowel sounds, soft, non-distended, non-tender.    Skin/Integumen: Visualized skin appeared clear.  Neuro: CN II-XII grossly intact.    Psych:  Alert and oriented x 3. Normal affect.  Extremities: Bilateral lower extremity edema noted, and calves are non-tender to palpation bilaterally.  Left lower extremity in a protective covering/Rooke boot.    Medical Decision Making       Please see A&P for additional details of medical decision making.  GREATER THAN 75 MINUTES SPENT BY ME on the date of service doing chart review, history, exam, documentation & further activities per the note.         Data   Data reviewed today: I reviewed all medications, new labs and imaging results over the last 24 hours. I personally reviewed the EKG tracing showing sinus rhythm .      I have personally reviewed the following data over the past 24 hrs:    6.0  \   10.7 (L)   / 178     138 100 14.6 /  96   4.0 26 0.84 \     ALT: 12 AST: 22 AP: 89 TBILI: 0.2   ALB: 4.1 TOT PROTEIN: 7.8 LIPASE: N/A     Trop: 34 (H) BNP: N/A     Procal: N/A CRP: N/A Lactic Acid: 0.9       INR:  N/A PTT:  N/A   D-dimer:  1.12 (H) Fibrinogen:  N/A       Imaging results reviewed over the past 24 hrs:   Recent Results (from the past 24 hour(s))   XR Chest 2 Views    Narrative    XR CHEST 2 VIEWS   4/5/2024 1:51 PM     HISTORY: Cough    COMPARISON: Chest radiograph 11/20/2023      Impression    IMPRESSION: No acute cardiopulmonary disease.    SHI SEWELL MD         SYSTEM ID:  [id number]   CT Chest Pulmonary Embolism w Contrast    Narrative    EXAM: CT CHEST PULMONARY EMBOLISM W CONTRAST  LOCATION: [facility]  DATE: 4/5/2024    INDICATION: SOB, dimer up  COMPARISON: None.  TECHNIQUE: CT chest pulmonary angiogram during arterial phase injection of IV contrast. Multiplanar reformats and MIP reconstructions were performed. Dose reduction techniques were used.   CONTRAST: 58 mL Isovue 370    FINDINGS:  ANGIOGRAM CHEST: Pulmonary arteries are normal caliber and negative for pulmonary emboli. Thoracic aorta is negative for dissection. No CT evidence of right heart strain.    LUNGS AND PLEURA: Moderate diffuse inflammatory bronchial wall thickening. Foci of endobronchial mucous plugs or secretions are seen scattered within lower lobes. Slight groundglass opacity posteriorly should represent dependent atelectasis. Mild   scarring within lingula.    MEDIASTINUM/AXILLAE: Patulous esophagus. No adenopathy.    CORONARY ARTERY CALCIFICATION: Mild.    UPPER ABDOMEN: Cholecystectomy.    MUSCULOSKELETAL: Normal.      Impression    IMPRESSION:  1.  No pulmonary emboli.  2.  Inflammatory bronchial wall thickening with scattered endoluminal mucous plugs and mild atelectasis.

## 2024-04-05 NOTE — TELEPHONE ENCOUNTER
[facility] calling to follow up on Lynsey order.  Informed it was faxed on 4/1/24.      Dasia LOUIS RN  [facility]

## 2024-04-06 LAB
ANION GAP SERPL CALCULATED.3IONS-SCNC: 15 MMOL/L (ref 7–15)
BUN SERPL-MCNC: 14.1 MG/DL (ref 8–23)
CALCIUM SERPL-MCNC: 8.3 MG/DL (ref 8.8–10.2)
CHLORIDE SERPL-SCNC: 102 MMOL/L (ref 98–107)
CREAT SERPL-MCNC: 0.74 MG/DL (ref 0.51–0.95)
DEPRECATED HCO3 PLAS-SCNC: 20 MMOL/L (ref 22–29)
EGFRCR SERPLBLD CKD-EPI 2021: 88 ML/MIN/1.73M2
ERYTHROCYTE [DISTWIDTH] IN BLOOD BY AUTOMATED COUNT: 11.9 % (ref 10–15)
GLUCOSE SERPL-MCNC: 75 MG/DL (ref 70–99)
HCT VFR BLD AUTO: 27.4 % (ref 35–47)
HGB BLD-MCNC: 8.9 G/DL (ref 11.7–15.7)
MCH RBC QN AUTO: 30.4 PG (ref 26.5–33)
MCHC RBC AUTO-ENTMCNC: 32.5 G/DL (ref 31.5–36.5)
MCV RBC AUTO: 94 FL (ref 78–100)
PLATELET # BLD AUTO: 115 10E3/UL (ref 150–450)
POTASSIUM SERPL-SCNC: 3.8 MMOL/L (ref 3.4–5.3)
RBC # BLD AUTO: 2.93 10E6/UL (ref 3.8–5.2)
SODIUM SERPL-SCNC: 137 MMOL/L (ref 135–145)
WBC # BLD AUTO: 3.2 10E3/UL (ref 4–11)

## 2024-04-06 PROCEDURE — 258N000003 HC RX IP 258 OP 636: Performed by: PHYSICIAN ASSISTANT

## 2024-04-06 PROCEDURE — 250N000013 HC RX MED GY IP 250 OP 250 PS 637: Performed by: HOSPITALIST

## 2024-04-06 PROCEDURE — 120N000001 HC R&B MED SURG/OB

## 2024-04-06 PROCEDURE — 36415 COLL VENOUS BLD VENIPUNCTURE: CPT | Performed by: PHYSICIAN ASSISTANT

## 2024-04-06 PROCEDURE — 85027 COMPLETE CBC AUTOMATED: CPT | Performed by: PHYSICIAN ASSISTANT

## 2024-04-06 PROCEDURE — 99232 SBSQ HOSP IP/OBS MODERATE 35: CPT | Performed by: HOSPITALIST

## 2024-04-06 PROCEDURE — 250N000011 HC RX IP 250 OP 636: Performed by: PHYSICIAN ASSISTANT

## 2024-04-06 PROCEDURE — 250N000013 HC RX MED GY IP 250 OP 250 PS 637: Performed by: PHYSICIAN ASSISTANT

## 2024-04-06 PROCEDURE — 80048 BASIC METABOLIC PNL TOTAL CA: CPT | Performed by: PHYSICIAN ASSISTANT

## 2024-04-06 RX ORDER — BACLOFEN 10 MG/1
10 TABLET ORAL DAILY PRN
Status: DISCONTINUED | OUTPATIENT
Start: 2024-04-06 | End: 2024-04-10 | Stop reason: HOSPADM

## 2024-04-06 RX ORDER — PHENAZOPYRIDINE HYDROCHLORIDE 100 MG/1
100 TABLET, FILM COATED ORAL
Qty: 6 TABLET | Refills: 0 | Status: COMPLETED | OUTPATIENT
Start: 2024-04-06 | End: 2024-04-08

## 2024-04-06 RX ADMIN — LEVOTHYROXINE SODIUM 100 MCG: 100 TABLET ORAL at 06:47

## 2024-04-06 RX ADMIN — LOSARTAN POTASSIUM 50 MG: 50 TABLET, FILM COATED ORAL at 21:27

## 2024-04-06 RX ADMIN — ACYCLOVIR 400 MG: 400 TABLET ORAL at 08:56

## 2024-04-06 RX ADMIN — DULOXETINE HYDROCHLORIDE 80 MG: 30 CAPSULE, DELAYED RELEASE ORAL at 08:55

## 2024-04-06 RX ADMIN — ACETAMINOPHEN 650 MG: 325 TABLET, FILM COATED ORAL at 19:49

## 2024-04-06 RX ADMIN — CEFTRIAXONE SODIUM 2 G: 2 INJECTION, POWDER, FOR SOLUTION INTRAMUSCULAR; INTRAVENOUS at 21:40

## 2024-04-06 RX ADMIN — PHENAZOPYRIDINE HYDROCHLORIDE 100 MG: 100 TABLET ORAL at 18:01

## 2024-04-06 RX ADMIN — BACLOFEN 10 MG: 10 TABLET ORAL at 08:56

## 2024-04-06 RX ADMIN — BACLOFEN 10 MG: 10 TABLET ORAL at 12:18

## 2024-04-06 RX ADMIN — GUAIFENESIN 1200 MG: 600 TABLET, EXTENDED RELEASE ORAL at 21:26

## 2024-04-06 RX ADMIN — PHENAZOPYRIDINE HYDROCHLORIDE 100 MG: 100 TABLET ORAL at 12:18

## 2024-04-06 RX ADMIN — ACYCLOVIR 400 MG: 400 TABLET ORAL at 19:49

## 2024-04-06 RX ADMIN — AZITHROMYCIN MONOHYDRATE 500 MG: 500 INJECTION, POWDER, LYOPHILIZED, FOR SOLUTION INTRAVENOUS at 22:09

## 2024-04-06 RX ADMIN — GUAIFENESIN 1200 MG: 600 TABLET, EXTENDED RELEASE ORAL at 08:55

## 2024-04-06 RX ADMIN — LOSARTAN POTASSIUM 25 MG: 25 TABLET, FILM COATED ORAL at 08:56

## 2024-04-06 RX ADMIN — GABAPENTIN 900 MG: 300 CAPSULE ORAL at 21:26

## 2024-04-06 RX ADMIN — GABAPENTIN 900 MG: 300 CAPSULE ORAL at 08:56

## 2024-04-06 RX ADMIN — BACLOFEN 40 MG: 20 TABLET ORAL at 21:26

## 2024-04-06 RX ADMIN — ACETAMINOPHEN 650 MG: 325 TABLET, FILM COATED ORAL at 08:56

## 2024-04-06 RX ADMIN — FAMOTIDINE 40 MG: 20 TABLET, FILM COATED ORAL at 21:26

## 2024-04-06 RX ADMIN — SODIUM CHLORIDE: 9 INJECTION, SOLUTION INTRAVENOUS at 12:29

## 2024-04-06 RX ADMIN — AZITHROMYCIN MONOHYDRATE 500 MG: 500 INJECTION, POWDER, LYOPHILIZED, FOR SOLUTION INTRAVENOUS at 00:03

## 2024-04-06 ASSESSMENT — ACTIVITIES OF DAILY LIVING (ADL)
ADLS_ACUITY_SCORE: 57
ADLS_ACUITY_SCORE: 56
ADLS_ACUITY_SCORE: 61
ADLS_ACUITY_SCORE: 57
ADLS_ACUITY_SCORE: 57
ADLS_ACUITY_SCORE: 61
ADLS_ACUITY_SCORE: 54
ADLS_ACUITY_SCORE: 57
ADLS_ACUITY_SCORE: 54
ADLS_ACUITY_SCORE: 57
ADLS_ACUITY_SCORE: 61
ADLS_ACUITY_SCORE: 54
ADLS_ACUITY_SCORE: 56
ADLS_ACUITY_SCORE: 57
ADLS_ACUITY_SCORE: 54
ADLS_ACUITY_SCORE: 57
ADLS_ACUITY_SCORE: 56
ADLS_ACUITY_SCORE: 57
ADLS_ACUITY_SCORE: 54

## 2024-04-06 NOTE — PROGRESS NOTES
.RECEIVING UNIT ED HANDOFF REVIEW    ED Nurse Handoff Report was reviewed by: Jose Recinos RN on April 5, 2024 at 7:49 PM     Awaiting floor orders for pt.   Room 632 set up for pt.     Please message floor when pt is ready for transfer.

## 2024-04-06 NOTE — PROGRESS NOTES
[facility]    Medicine Progress Note - Hospitalist Service    Date of Admission:  4/5/2024    Assessment & Plan   Marylee Woods is a 68 year old female with medical history significant for Current E. Coli UTI with history of reoccurrence (on day 4 of Cipro), MS, Neurogenic bladder, Urinary incontinence, CKD stage 3, HTN, HLP, GERD, Postablative hypothyroidism, HSV, Osteopenia, MDD with anxiety, Central sleep apnea, Closed pressure injury of the sacrum, Lymphedema, Known lung nodules, History of left sided breast cancer who was admitted on 4/5/2024 due to acute hypoxic respiratory failure secondary to acute bronchitis.  She had cough, fever, high BP, and hypoxia starting 3 days prior to presentation.       Acute hypoxic respiratory failure  *Baseline patient does not require supplemental O2.  Currently still requiring 1-2 L/m per nasal cannula as of 4/6  *D Dimer 1.12  *CT PE was negative for PE but revealed inflammatory bronchial wall thickening with scattered endoluminal mucous plugs and mild atelectasis   - Supplemental O2 available; wean as able.  Goal O2 sat 90% or higher.  - Encourage use of IS/Flutter valve.    - Continue management as below.    - RCAT consult requested.       Acute bronchitis  *With acute hypoxic respiratory failure with reported fevers (Max of 103) and persistent productive cough will start IV antibiotics.    - IV Ceftriaxone 1 g/d.    - IV Azithromycin 500 mg/d.    - Mucinex 1200 mg BID.    - Encourage PO fluids.    - IV fluids with NS at 75 ml/hr initially, now stopped - encourage PO intake     E. Coli UTI   History of recurrent UTI  *UCx from 4/2 was sensitive to everything but ampicillin.    - Stopped PTA Cipro in favor of IV antibiotics above.         Pancytopenia  Hgb 8.9 - mildly lower than baseline and likely somewhat dilutional. WBC 3.2. Platelets 115.   - no bleeding concerns  - likely in part with chronic disease, CKD, MS etc and somewhat in relation to  infection and illness  - monitor CBC in AM    Closed sacral pressure injury  - WOC Nurse consult requested.    - Low air flow mattress ordered.    - Patient care order in place to reposition patient every 2 hours while awake.       Troponin elevation  *Troponin elevated at 34 and on repeat remained at 34.  Patient denied chest pain.  EKG with normal sinus rhythm.  No further work-up at this time.       MS  *Wheelchair bound at baseline.    - Resumed on PTA baclofen (10 mg every morning, 10 mg every afternoon as needed and 40 mg at bedtime).   - added daily prn 10 mg baclofen (confirmed with pt/spouse)  - Resumed on PTA gabapentin 600 mg QID.    - Hold PTA BRIUMVI injection and resume at discharge.       Neurogenic bladder  Urinary incontinence  *Noted on chart review.       Chronic kidney disease stage 3  Baseline creatinine 0.5-0.9.       HTN  - Resumed on PTA losartan 25 mg every morning and 50 mg every evening.  Hold parameters in place.    - PRN IV hydralazine 10 mg every 4 hours for SBP GREATER THAN 180.       HLP  *Noted on chart review.  Not currently on any medications.       GERD  - Resumed on PTA Pepcid 40 mg at bedtime.         Postablative hypothyroidism  - Resumed on PTA synthroid 100 mcg (takes 6 times a week).       Lymphedema  *Utilizes compression stocking at home and working with home health.    - PCD's ordered.       HSV  - Resumed on PTA acyclovir 400 mg every 12 hours.       Osteopenia  - Hold PTA supplements and resume at discharge.       Major depressive disorder with anxiety  - Resumed on PTA Cymbalta 80 mg/d.     Central sleep apnea with day time drowsiness  - B- BiPAP ordered with home settings.    - Resumed on PTA Armodafinil 200 mg every morning.       Known lung nodules  *Noted on chart review but not reported on Chest CT/PE study completed in the ED.  Recommend continued follow-up with PCP.       History of left sided breast cancer  *S/p lumpectomy and radiation therapy.  No  interventions.               Diet: Combination Diet Regular Diet Adult    DVT Prophylaxis: Pneumatic Compression Devices  Verduzco Catheter: Not present  Lines: None     Cardiac Monitoring: None  Code Status: Full Code      Clinically Significant Risk Factors Present on Admission                  # Hypertension: Noted on problem list          # Financial/Environmental Concerns:           Disposition Plan      Expected Discharge Date: 04/07/2024                    Paco Mata MD  Hospitalist Service  [facility]  Securely message with [employer] (more info)  Text page via [employer] Paging/Directory   ______________________________________________________________________    Interval History   Seen and examined. No new major complaints/issues. Spouse at bedside. Reports some improvement but still sob and needing O2. Discussed continued abx and other treatments.  No fevers chills or chest pain currently.  They report her LE edema is actually improved from baseline right now.      Physical Exam   Vital Signs: Temp: 98  F (36.7  C) Temp src: Oral BP: 138/68 Pulse: 84   Resp: 18 SpO2: 94 % O2 Device: Nasal cannula Oxygen Delivery: 2 LPM  Weight: 140 lbs 0 oz    Gen: NAD, pleasant  HEENT: EOMI, MMM  Resp: no focal crackles,  no wheezes, no increased work of resp  CV: S1S2 heard, reg rhythm, reg rate  Abdo: soft, nontender, nondistended, bowel sounds present  Ext: calves nontender, well perfused  Neuro: aa, conversant, moving ext, CN grossly intact, no facial asymmetry      Medical Decision Making       42 MINUTES SPENT BY ME on the date of service doing chart review, history, exam, documentation & further activities per the note.      Data     I have personally reviewed the following data over the past 24 hrs:    3.2 (L)  \   8.9 (L)   / 115 (L)     137 102 14.1 /  75   3.8 20 (L) 0.74 \

## 2024-04-06 NOTE — ED NOTES
[facility]  ED Nurse Handoff Report    ED Chief complaint: Fever, Cough, Hypertension, and Nausea      ED Diagnosis:   Final diagnoses:   Cough, unspecified type   Hypoxemia       Code Status: Refer to hospitalist order    Allergies:   Allergies   Allergen Reactions    Bactrim [Sulfamethoxazole-Trimethoprim] Hallucination    Hydrochlorothiazide      Hyponatremia    Sulfamethizole     Amantadine      hallucinations    Budesonide     Budesonide-Formoterol Fumarate Rash       Patient Story: Pt has been sick lately with a cough, fever and nausea for the past week   Pt was told she had low 02   Focused Assessment:  See Chart  Results for orders placed or performed during the hospital encounter of 04/05/24   XR Chest 2 Views     Status: None    Narrative    XR CHEST 2 VIEWS   4/5/2024 1:51 PM     HISTORY: Cough    COMPARISON: Chest radiograph 11/20/2023      Impression    IMPRESSION: No acute cardiopulmonary disease.    SHI SEWELL MD         SYSTEM ID:  [id number]   CT Chest Pulmonary Embolism w Contrast     Status: None    Narrative    EXAM: CT CHEST PULMONARY EMBOLISM W CONTRAST  LOCATION: [facility]  DATE: 4/5/2024    INDICATION: SOB, dimer up  COMPARISON: None.  TECHNIQUE: CT chest pulmonary angiogram during arterial phase injection of IV contrast. Multiplanar reformats and MIP reconstructions were performed. Dose reduction techniques were used.   CONTRAST: 58 mL Isovue 370    FINDINGS:  ANGIOGRAM CHEST: Pulmonary arteries are normal caliber and negative for pulmonary emboli. Thoracic aorta is negative for dissection. No CT evidence of right heart strain.    LUNGS AND PLEURA: Moderate diffuse inflammatory bronchial wall thickening. Foci of endobronchial mucous plugs or secretions are seen scattered within lower lobes. Slight groundglass opacity posteriorly should represent dependent atelectasis. Mild   scarring within lingula.    MEDIASTINUM/AXILLAE: Patulous esophagus.  No adenopathy.    CORONARY ARTERY CALCIFICATION: Mild.    UPPER ABDOMEN: Cholecystectomy.    MUSCULOSKELETAL: Normal.      Impression    IMPRESSION:  1.  No pulmonary emboli.  2.  Inflammatory bronchial wall thickening with scattered endoluminal mucous plugs and mild atelectasis.   Symptomatic Influenza A/B, RSV, & SARS-CoV2 PCR (COVID-19) Nasopharyngeal     Status: Normal    Specimen: Nasopharyngeal; Swab   Result Value Ref Range    Influenza A PCR Negative Negative    Influenza B PCR Negative Negative    RSV PCR Negative Negative    SARS CoV2 PCR Negative Negative    Narrative    Testing was performed using the Xpert Xpress CoV2/Flu/RSV Assay on the Cepheid GeneXpert Instrument. This test should be ordered for the detection of SARS-CoV-2, influenza, and RSV viruses in individuals who meet clinical and/or epidemiological criteria. Test performance is unknown in asymptomatic patients. This test is for in vitro diagnostic use under the FDA EUA for laboratories certified under CLIA to perform high or moderate complexity testing. This test has not been FDA cleared or approved. A negative result does not rule out the presence of PCR inhibitors in the specimen or target RNA in concentration below the limit of detection for the assay. If only one viral target is positive but coinfection with multiple targets is suspected, the sample should be re-tested with another FDA cleared, approved, or authorized test, if coinfection would change clinical management. This test was validated by the [facility] [employer]. These laboratories are certified under the Clinical Laboratory Improvement Amendments of 1988 (CLIA-88) as qualified to perform high complexity laboratory testing.   Comprehensive metabolic panel     Status: Normal   Result Value Ref Range    Sodium 138 135 - 145 mmol/L    Potassium 4.0 3.4 - 5.3 mmol/L    Carbon Dioxide (CO2) 26 22 - 29 mmol/L    Anion Gap 12 7 - 15 mmol/L    Urea Nitrogen 14.6 8.0 - 23.0  mg/dL    Creatinine 0.84 0.51 - 0.95 mg/dL    GFR Estimate 75 >60 mL/min/1.73m2    Calcium 9.1 8.8 - 10.2 mg/dL    Chloride 100 98 - 107 mmol/L    Glucose 96 70 - 99 mg/dL    Alkaline Phosphatase 89 40 - 150 U/L    AST 22 0 - 45 U/L    ALT 12 0 - 50 U/L    Protein Total 7.8 6.4 - 8.3 g/dL    Albumin 4.1 3.5 - 5.2 g/dL    Bilirubin Total 0.2 <=1.2 mg/dL   Lactic acid whole blood     Status: Normal   Result Value Ref Range    Lactic Acid 0.9 0.7 - 2.0 mmol/L   Troponin T, High Sensitivity (now)     Status: Abnormal   Result Value Ref Range    Troponin T, High Sensitivity 34 (H) <=14 ng/L   CBC with platelets and differential     Status: Abnormal   Result Value Ref Range    WBC Count 6.0 4.0 - 11.0 10e3/uL    RBC Count 3.50 (L) 3.80 - 5.20 10e6/uL    Hemoglobin 10.7 (L) 11.7 - 15.7 g/dL    Hematocrit 33.0 (L) 35.0 - 47.0 %    MCV 94 78 - 100 fL    MCH 30.6 26.5 - 33.0 pg    MCHC 32.4 31.5 - 36.5 g/dL    RDW 12.0 10.0 - 15.0 %    Platelet Count 178 150 - 450 10e3/uL    % Neutrophils 81 %    % Lymphocytes 14 %    % Monocytes 4 %    % Eosinophils 1 %    % Basophils 0 %    % Immature Granulocytes 0 %    NRBCs per 100 WBC 0 <1 /100    Absolute Neutrophils 4.8 1.6 - 8.3 10e3/uL    Absolute Lymphocytes 0.9 0.8 - 5.3 10e3/uL    Absolute Monocytes 0.3 0.0 - 1.3 10e3/uL    Absolute Eosinophils 0.0 0.0 - 0.7 10e3/uL    Absolute Basophils 0.0 0.0 - 0.2 10e3/uL    Absolute Immature Granulocytes 0.0 <=0.4 10e3/uL    Absolute NRBCs 0.0 10e3/uL   Troponin T, High Sensitivity     Status: Abnormal   Result Value Ref Range    Troponin T, High Sensitivity 34 (H) <=14 ng/L   D dimer quantitative     Status: Abnormal   Result Value Ref Range    D-Dimer Quantitative 1.12 (H) 0.00 - 0.50 ug/mL FEU    Narrative    This D-dimer assay is intended for use in conjunction with a clinical pretest probability assessment model to exclude pulmonary embolism (PE) and deep venous thrombosis (DVT) in outpatients suspected of PE or DVT. The cut-off value  is 0.50 ug/mL FEU.    For patients 50 years of age or older, the application of age-adjusted cut-off values for D-Dimer may increase the specificity without significant effect on sensitivity. The literature suggested calculation age adjusted cut-off in ug/L = age in years x 10 ug/L. The results in this laboratory are reported as ug/mL rather than ug/L. The calculation for age adjusted cut off in ug/mL= age in years x 0.01 ug/mL. For example, the cut off for a 76 year old male is 76 x 0.01 ug/mL = 0.76 ug/mL (760 ug/L).    M Uvaldo et al. Age adjusted D-dimer cut-off levels to rule out pulmonary embolism: The ADJUST-PE Study. ROJAS 2014;311:6765-1993.; HJ Gordon et al. Diagnostic accuracy of conventional or age adjusted D-dimer cutoff values in older patients with suspected venous thromboembolism. Systemic review and meta-analysis. BMJ 2013:346:f2492.   EKG 12-lead, tracing only     Status: None   Result Value Ref Range    Systolic Blood Pressure  mmHg    Diastolic Blood Pressure  mmHg    Ventricular Rate 93 BPM    Atrial Rate 93 BPM    ID Interval 156 ms    QRS Duration 80 ms     ms    QTc 427 ms    P Axis 84 degrees    R AXIS 33 degrees    T Axis 66 degrees    Interpretation ECG       Sinus rhythm  Normal ECG  When compared with ECG of 05-JAN-2024 23:14,  No significant change was found  Confirmed by GENERATED REPORT, COMPUTER ([id number]),  Hailee Stanley ([id number]) on 4/5/2024 2:26:17 PM     CBC with platelets + differential     Status: Abnormal    Narrative    The following orders were created for panel order CBC with platelets + differential.  Procedure                               Abnormality         Status                     ---------                               -----------         ------                     CBC with platelets and d...[[id number]]  Abnormal            Final result                 Please view results for these tests on the individual orders.         Treatments and/or interventions  provided: See Chart  Patient's response to treatments and/or interventions: Seechart    To be done/followed up on inpatient unit:  See chart    Does this patient have any cognitive concerns?:  None    Activity level - Baseline/Home:  Wheelchair  Activity Level - Current:   Wheelchair    Patient's Preferred language: English   Needed?: No    Isolation: None  Infection: Not Applicable  Patient tested for COVID 19 prior to admission: YES  Bariatric?: No    Vital Signs:   Vitals:    04/05/24 1610 04/05/24 1615 04/05/24 1626 04/05/24 1700   BP:   (!) 151/68    Pulse:   92    Resp:   20    Temp:       TempSrc:       SpO2: (!) 89% 94% 92% 95%   Weight:       Height:           Cardiac Rhythm:     Was the PSS-3 completed:   Yes  What interventions are required if any?               Family Comments: None  OBS brochure/video discussed/provided to patient/family: N/A              Name of person given brochure if not patient: NA              Relationship to patient: NA    For the majority of the shift this patient's behavior was Green.   Behavioral interventions performed were None.    ED NURSE PHONE NUMBER: [phone]

## 2024-04-06 NOTE — PLAN OF CARE
Summary: Hypoxia   DATE & TIME: 04/05/2024 2100-230    Cognitive Concerns/ Orientation : A&Ox4. Calm and cooperative.    BEHAVIOR & AGGRESSION TOOL COLOR: Green   CIWA SCORE: NA    ABNL VS/O2: VSS with BP in the 130's. On 2L O2 via NC. Coarse, congested cough.   MOBILITY: WC baseline. Lift or slider for transfers. BLE weak with notable contractions. Good UE strength.   PAIN MANAGMENT: Denies   DIET: Regular   BOWEL/BLADDER: Purewick in place, hx of neurogenic bladder. No BM this shift.   ABNL LAB/BG: Trop 34, Hgb 10.7, D-Dim 1.12,   UA/UC (+)  DRAIN/DEVICES: PIV in L wrist infusing NS at 75mL/hr with intermittent abx.   TELEMETRY RHYTHM: NA   SKIN: Old, preexisting healed pressure wound on coccyx. BLE edema. WOC ordered.    TESTS/PROCEDURES: NONE  D/C DAY/GOALS/PLACE: Pending  OTHER IMPORTANT INFO: Bed alarm on for safety.

## 2024-04-06 NOTE — PHARMACY-ADMISSION MEDICATION HISTORY
Pharmacist Admission Medication History    Admission medication history is complete. The information provided in this note is only as accurate as the sources available at the time of the update.    Information Source(s): Patient and CareEverywhere/SureScripts via in-person    Pertinent Information: Losartan has been increased from 25 mg twice daily to now 25 mg every morning and 50 mg evening    Changes made to PTA medication list:  Added: None  Deleted: None  Changed: baclofen--patient takes 10 mg every morning and noon and 40 mg at bedtime    Allergies reviewed with patient and updates made in EHR:  Assessed prior to this interview    Medication History Completed By: Luz Hanna [facility] 4/5/2024 8:28 PM    PTA Med List   Medication Sig Note Last Dose    acyclovir (ZOVIRAX) 400 MG tablet Take 1 tablet (400 mg) by mouth every 12 hours  4/5/2024 at am    Armodafinil 200 MG TABS Take 200 mg by mouth every morning  4/5/2024 at am    baclofen (LIORESAL) 10 MG tablet Take 40 mg by mouth at bedtime In addition to morning and noon doses  4/4/2024 at pm    baclofen (LIORESAL) 10 MG tablet Take 10 mg by mouth 2 times daily 0800 and noon (in addition to bedtime dose)  4/5/2024 at am    ciprofloxacin (CIPRO) 500 MG tablet Take 1 tablet (500 mg) by mouth 2 times daily for 7 days (Patient taking differently: Take 500 mg by mouth 2 times daily For 7 days (4/3/24-4/10/24))  4/5/2024 at am    DULoxetine (CYMBALTA) 20 MG capsule TAKE 1 CAPSULE BY MOUTH ONCE DAILY ALONG  WITH  A  60  MG  TABLET  FOR  A  TOTAL  DOSE  OF  80  MG  DAILY  4/5/2024 at am    DULoxetine (CYMBALTA) 60 MG capsule Take 1 capsule by mouth once daily  4/5/2024 at am    famotidine (PEPCID) 40 MG tablet TAKE 1 TABLET BY MOUTH AT BEDTIME  4/4/2024 at pm    gabapentin (NEURONTIN) 300 MG capsule Take 2 capsules (600 mg) by mouth 4 times daily (Patient taking differently: Take 900 mg by mouth 2 times daily)  4/5/2024 at am    latanoprost (XALATAN) 0.005 % ophthalmic  solution INSTILL 1 DROP INTO BOTH EYES EVERY DAY AS DIRECTED PT WILL NEED TO BE SEEN FOR FUTURE REFILLS  4/4/2024 at pm    levothyroxine (SYNTHROID/LEVOTHROID) 100 MCG tablet Take 1 tablet (100 mcg) by mouth daily SIX TIMES A WEEK (Patient taking differently: Take 100 mcg by mouth daily SIX TIMES A WEEK (skip Sundays))  4/5/2024 at am    losartan (COZAAR) 25 MG tablet Take 1 in the AM (25 mg) and 2 in the PM (50 mg)  4/5/2024 at am    nitroFURantoin macrocrystal-monohydrate (MACROBID) 100 MG capsule Take 1 capsule (100 mg) by mouth daily 4/5/2024: Patient has been holding this since starting ciprofloxacin for 7 days Past Week    oxyCODONE (ROXICODONE) 5 MG tablet Take 0.5 tablets (2.5 mg) by mouth every 6 hours as needed for moderate pain  PRN    polyethylene glycol (MIRALAX) 17 GM/Dose powder Take 17 g by mouth daily (Patient taking differently: Take 17 g by mouth daily as needed for constipation)  PRN

## 2024-04-07 LAB
ANION GAP SERPL CALCULATED.3IONS-SCNC: 11 MMOL/L (ref 7–15)
BASOPHILS # BLD AUTO: 0 10E3/UL (ref 0–0.2)
BASOPHILS NFR BLD AUTO: 0 %
BUN SERPL-MCNC: 12.2 MG/DL (ref 8–23)
CALCIUM SERPL-MCNC: 8.7 MG/DL (ref 8.8–10.2)
CHLORIDE SERPL-SCNC: 107 MMOL/L (ref 98–107)
CREAT SERPL-MCNC: 0.67 MG/DL (ref 0.51–0.95)
DEPRECATED HCO3 PLAS-SCNC: 25 MMOL/L (ref 22–29)
EGFRCR SERPLBLD CKD-EPI 2021: >90 ML/MIN/1.73M2
EOSINOPHIL # BLD AUTO: 0.1 10E3/UL (ref 0–0.7)
EOSINOPHIL NFR BLD AUTO: 4 %
ERYTHROCYTE [DISTWIDTH] IN BLOOD BY AUTOMATED COUNT: 11.9 % (ref 10–15)
GLUCOSE SERPL-MCNC: 92 MG/DL (ref 70–99)
HCT VFR BLD AUTO: 29.1 % (ref 35–47)
HGB BLD-MCNC: 9.5 G/DL (ref 11.7–15.7)
IMM GRANULOCYTES # BLD: 0 10E3/UL
IMM GRANULOCYTES NFR BLD: 0 %
LYMPHOCYTES # BLD AUTO: 0.9 10E3/UL (ref 0.8–5.3)
LYMPHOCYTES NFR BLD AUTO: 34 %
MCH RBC QN AUTO: 30.3 PG (ref 26.5–33)
MCHC RBC AUTO-ENTMCNC: 32.6 G/DL (ref 31.5–36.5)
MCV RBC AUTO: 93 FL (ref 78–100)
MONOCYTES # BLD AUTO: 0.2 10E3/UL (ref 0–1.3)
MONOCYTES NFR BLD AUTO: 7 %
NEUTROPHILS # BLD AUTO: 1.4 10E3/UL (ref 1.6–8.3)
NEUTROPHILS NFR BLD AUTO: 55 %
NRBC # BLD AUTO: 0 10E3/UL
NRBC BLD AUTO-RTO: 0 /100
PLATELET # BLD AUTO: 116 10E3/UL (ref 150–450)
POTASSIUM SERPL-SCNC: 3.9 MMOL/L (ref 3.4–5.3)
RBC # BLD AUTO: 3.14 10E6/UL (ref 3.8–5.2)
SODIUM SERPL-SCNC: 143 MMOL/L (ref 135–145)
WBC # BLD AUTO: 2.5 10E3/UL (ref 4–11)

## 2024-04-07 PROCEDURE — 85041 AUTOMATED RBC COUNT: CPT | Performed by: HOSPITALIST

## 2024-04-07 PROCEDURE — 36415 COLL VENOUS BLD VENIPUNCTURE: CPT | Performed by: HOSPITALIST

## 2024-04-07 PROCEDURE — 250N000011 HC RX IP 250 OP 636: Performed by: PHYSICIAN ASSISTANT

## 2024-04-07 PROCEDURE — 120N000001 HC R&B MED SURG/OB

## 2024-04-07 PROCEDURE — 250N000013 HC RX MED GY IP 250 OP 250 PS 637: Performed by: HOSPITALIST

## 2024-04-07 PROCEDURE — 250N000013 HC RX MED GY IP 250 OP 250 PS 637: Performed by: PHYSICIAN ASSISTANT

## 2024-04-07 PROCEDURE — 80048 BASIC METABOLIC PNL TOTAL CA: CPT | Performed by: HOSPITALIST

## 2024-04-07 PROCEDURE — 99232 SBSQ HOSP IP/OBS MODERATE 35: CPT | Performed by: HOSPITALIST

## 2024-04-07 RX ADMIN — GUAIFENESIN 1200 MG: 600 TABLET, EXTENDED RELEASE ORAL at 10:18

## 2024-04-07 RX ADMIN — ARMODAFINIL 200 MG: 50 TABLET ORAL at 10:09

## 2024-04-07 RX ADMIN — ACYCLOVIR 400 MG: 400 TABLET ORAL at 10:18

## 2024-04-07 RX ADMIN — GABAPENTIN 900 MG: 300 CAPSULE ORAL at 21:31

## 2024-04-07 RX ADMIN — GUAIFENESIN 1200 MG: 600 TABLET, EXTENDED RELEASE ORAL at 21:31

## 2024-04-07 RX ADMIN — POLYETHYLENE GLYCOL 3350 17 G: 17 POWDER, FOR SOLUTION ORAL at 14:39

## 2024-04-07 RX ADMIN — BACLOFEN 10 MG: 10 TABLET ORAL at 14:39

## 2024-04-07 RX ADMIN — ACYCLOVIR 400 MG: 400 TABLET ORAL at 20:35

## 2024-04-07 RX ADMIN — BACLOFEN 10 MG: 10 TABLET ORAL at 13:01

## 2024-04-07 RX ADMIN — ACETAMINOPHEN 650 MG: 325 TABLET, FILM COATED ORAL at 19:38

## 2024-04-07 RX ADMIN — ACETAMINOPHEN 650 MG: 325 TABLET, FILM COATED ORAL at 14:39

## 2024-04-07 RX ADMIN — PHENAZOPYRIDINE HYDROCHLORIDE 100 MG: 100 TABLET ORAL at 10:17

## 2024-04-07 RX ADMIN — FAMOTIDINE 40 MG: 20 TABLET, FILM COATED ORAL at 22:11

## 2024-04-07 RX ADMIN — BACLOFEN 10 MG: 10 TABLET ORAL at 10:09

## 2024-04-07 RX ADMIN — ACETAMINOPHEN 650 MG: 325 TABLET, FILM COATED ORAL at 10:07

## 2024-04-07 RX ADMIN — LOSARTAN POTASSIUM 25 MG: 25 TABLET, FILM COATED ORAL at 10:17

## 2024-04-07 RX ADMIN — BACLOFEN 40 MG: 20 TABLET ORAL at 21:40

## 2024-04-07 RX ADMIN — CEFTRIAXONE SODIUM 2 G: 2 INJECTION, POWDER, FOR SOLUTION INTRAMUSCULAR; INTRAVENOUS at 22:10

## 2024-04-07 RX ADMIN — PHENAZOPYRIDINE HYDROCHLORIDE 100 MG: 100 TABLET ORAL at 13:02

## 2024-04-07 RX ADMIN — AZITHROMYCIN MONOHYDRATE 500 MG: 500 INJECTION, POWDER, LYOPHILIZED, FOR SOLUTION INTRAVENOUS at 22:54

## 2024-04-07 RX ADMIN — PHENAZOPYRIDINE HYDROCHLORIDE 100 MG: 100 TABLET ORAL at 17:47

## 2024-04-07 RX ADMIN — DULOXETINE HYDROCHLORIDE 80 MG: 30 CAPSULE, DELAYED RELEASE ORAL at 10:18

## 2024-04-07 RX ADMIN — GABAPENTIN 900 MG: 300 CAPSULE ORAL at 10:17

## 2024-04-07 RX ADMIN — LOSARTAN POTASSIUM 50 MG: 50 TABLET, FILM COATED ORAL at 22:11

## 2024-04-07 RX ADMIN — ONDANSETRON 4 MG: 4 TABLET, ORALLY DISINTEGRATING ORAL at 10:15

## 2024-04-07 ASSESSMENT — ACTIVITIES OF DAILY LIVING (ADL)
ADLS_ACUITY_SCORE: 52
ADLS_ACUITY_SCORE: 52
ADLS_ACUITY_SCORE: 53
ADLS_ACUITY_SCORE: 54
ADLS_ACUITY_SCORE: 52
ADLS_ACUITY_SCORE: 54
ADLS_ACUITY_SCORE: 53
ADLS_ACUITY_SCORE: 54
ADLS_ACUITY_SCORE: 54
ADLS_ACUITY_SCORE: 52
ADLS_ACUITY_SCORE: 53
ADLS_ACUITY_SCORE: 54
ADLS_ACUITY_SCORE: 52

## 2024-04-07 NOTE — PLAN OF CARE
Goal Outcome Evaluation:  Summary: Hypoxia   DATE & TIME: 04/06/24 2584-6068  Cognitive Concerns/ Orientation : A&Ox4. Calm and cooperative.    BEHAVIOR & AGGRESSION TOOL COLOR: Green   CIWA SCORE: NA    ABNL VS/O2: VSS  On 2L O2 via NC.   MOBILITY: WC baseline. Lift or slider for transfers. BLE weak with notable contractions, baseline neuropathy. Good UE strength.  T/R q2h  PAIN MANAGMENT: chronic joint pain, tylenol effective  BOWEL/BLADDER: Purewick in place, hx of neurogenic bladder. No BM this shift. Pt refused  Senna & miralax this evening.   ABNL LAB/BG: Trop 34, Hgb 8.9,  D-Dim 1.12 (CT neg for PE), UA/UC (+)  DRAIN/DEVICES: PIV SL  TELEMETRY RHYTHM: NA   SKIN: Old, preexisting healed pressure wound on coccyx- notable erythema and small scabbing/excoriation . BLE edema. WOC ordered.    TESTS/PROCEDURES: labs   D/C DAY/GOALS/PLACE: Pending  OTHER IMPORTANT INFO: MCALLISTER, lungs have fine crackles in bases. Coarse, non productive congested cough.

## 2024-04-07 NOTE — PLAN OF CARE
Goal Outcome Evaluation:       DATE & TIME: 04/06/24-4/7/24 4938-1169  Cognitive Concerns/ Orientation : A&Ox4. Calm and cooperative.    BEHAVIOR & AGGRESSION TOOL COLOR: Green   CIWA SCORE: NA    ABNL VS/O2: VSS  On 2L O2 via NC.   MOBILITY: WC baseline. Lift or slider for transfers. BLE weak with contractions, baseline neuropathy. T/R q2h  PAIN MANAGMENT: chronic joint pain, Given PRN Tylenol x1  BOWEL/BLADDER: Purewick in place, hx of neurogenic bladder. No BM this shift. Pt refused  Senna    ABNL LAB/BG: Trop 34, Hgb 8.9,  D-Dim 1.12 (CT neg for PE), UA/UC (+)  DRAIN/DEVICES: PIV SL  TELEMETRY RHYTHM: NA   SKIN: Old, preexisting pressure wound on coccyx. BLE edema. WOC ordered.    TESTS/PROCEDURES: labs   D/C DAY/GOALS/PLACE: Pending  OTHER IMPORTANT INFO: MCALLISTER

## 2024-04-07 NOTE — PROGRESS NOTES
[facility]    Medicine Progress Note - Hospitalist Service    Date of Admission:  4/5/2024    Assessment & Plan   Marylee Woods is a 68 year old female with medical history significant for Current E. Coli UTI with history of reoccurrence (on day 4 of Cipro), MS, Neurogenic bladder, Urinary incontinence, CKD stage 3, HTN, HLP, GERD, Postablative hypothyroidism, HSV, Osteopenia, MDD with anxiety, Central sleep apnea, Closed pressure injury of the sacrum, Lymphedema, Known lung nodules, History of left sided breast cancer who was admitted on 4/5/2024 due to acute hypoxic respiratory failure secondary to acute bronchitis.  She had cough, fever, high BP, and hypoxia starting 3 days prior to presentation.        Acute hypoxic respiratory failure  *Baseline patient does not require supplemental O2.  Currently still requiring 1-2 L/m per nasal cannula as of 4/6  *D Dimer 1.12  *CT PE was negative for PE but revealed inflammatory bronchial wall thickening with scattered endoluminal mucous plugs and mild atelectasis   - Supplemental O2 available; wean as able.  Goal O2 sat 90% or higher.  - Encourage use of IS/Flutter valve.    - Continue management as below.    - RCAT consult requested.    - 4/7 down to 1L , continue IS and activity/weaning as able     Acute bronchitis  *With acute hypoxic respiratory failure with reported fevers (Max of 103) and persistent productive cough will start IV antibiotics.    - IV Ceftriaxone 1 g/d.    - IV Azithromycin 500 mg/d.    - Mucinex 1200 mg BID.    - Encourage PO fluids.    - encourage PO intake     E. Coli UTI   History of recurrent UTI  *UCx from 4/2 was sensitive to everything but ampicillin.    - Stopped PTA Cipro in favor of IV antibiotics above.         Pancytopenia  Hgb 8.9 - mildly lower than baseline and likely somewhat dilutional. WBC 3.2. Platelets 115.   - no bleeding concerns  - likely in part with chronic disease, CKD, MS etc and somewhat in  relation to infection and illness  - follow CBC - will need outpatient follow up and recheck also     Closed sacral pressure injury  - [facility] Nurse consult requested.    - Low air flow mattress ordered.    - Patient care order in place to reposition patient every 2 hours while awake.       Troponin elevation  *Troponin elevated at 34 and on repeat remained at 34.  Patient denied chest pain.  EKG with normal sinus rhythm.  No further work-up at this time.       MS  *Wheelchair bound at baseline.    - Resumed on PTA baclofen (10 mg every morning, 10 mg every afternoon as needed and 40 mg at bedtime).              - added daily prn 10 mg baclofen (confirmed with pt/spouse)  - Resumed on PTA gabapentin 600 mg QID.    - Hold PTA BRIUMVI injection and resume at discharge.       Neurogenic bladder  Urinary incontinence  *Noted on chart review.       Chronic kidney disease stage 3  Baseline creatinine 0.5-0.9.       HTN  - Resumed on PTA losartan 25 mg every morning and 50 mg every evening.  Hold parameters in place.    - PRN IV hydralazine 10 mg every 4 hours for SBP GREATER THAN 180.       HLP  *Noted on chart review.  Not currently on any medications.       GERD  - Resumed on PTA Pepcid 40 mg at bedtime.         Postablative hypothyroidism  - Resumed on PTA synthroid 100 mcg (takes 6 times a week).       Lymphedema  *Utilizes compression stocking at home and working with home health.    - PCD's ordered.       HSV  - Resumed on PTA acyclovir 400 mg every 12 hours.       Osteopenia  - Hold PTA supplements and resume at discharge.       Major depressive disorder with anxiety  - Resumed on PTA Cymbalta 80 mg/d.     Central sleep apnea with day time drowsiness  - B- BiPAP ordered with home settings.    - Resumed on PTA Armodafinil 200 mg every morning.       Known lung nodules  *Noted on chart review but not reported on Chest CT/PE study completed in the ED.  Recommend continued follow-up with PCP.       History of left sided  breast cancer  *S/p lumpectomy and radiation therapy.  No interventions.            Diet: Combination Diet Regular Diet Adult    DVT Prophylaxis: Pneumatic Compression Devices  Verduzco Catheter: Not present  Lines: None     Cardiac Monitoring: None  Code Status: Full Code      Clinically Significant Risk Factors          # Hypocalcemia: Lowest Ca = 8.3 mg/dL in last 2 days, will monitor and replace as appropriate       # Thrombocytopenia: Lowest platelets = 115 in last 2 days, will monitor for bleeding   # Hypertension: Noted on problem list            # Financial/Environmental Concerns:           Disposition Plan     Expected Discharge Date: 04/07/2024                    Paco Mata MD  Hospitalist Service  [facility]  Securely message with [employer] (more info)  Text page via [employer] Paging/Directory   ______________________________________________________________________    Interval History   Seen and examined. No new major complaints/issues.  Spouse and dtr at bedside. Coughing still but using IS more and O2 need slightly improved.    Physical Exam   Vital Signs: Temp: 97.3  F (36.3  C) Temp src: Oral BP: 126/62 Pulse: 70   Resp: 16 SpO2: 95 % O2 Device: Nasal cannula Oxygen Delivery: 2 LPM  Weight: 140 lbs 0 oz    Gen: NAD, pleasant  HEENT: EOMI, MMM  Resp: no focal crackles,  no wheezes, no increased work of resp  CV: S1S2 heard, reg rhythm, reg rate  Abdo: soft, nontender, nondistended, bowel sounds present  Ext: calves nontender, well perfused  Neuro: aa, conversant, moving ext, CN grossly intact, no facial asymmetry      Medical Decision Making       39 MINUTES SPENT BY ME on the date of service doing chart review, history, exam, documentation & further activities per the note.      Data     I have personally reviewed the following data over the past 24 hrs:    2.5 (L)  \   9.5 (L)   / 116 (L)     143 107 12.2 /  92   3.9 25 0.67 \

## 2024-04-07 NOTE — PLAN OF CARE
Goal Outcome Evaluation:      Summary: Hypoxia secondary to  acute bronchitis  DATE & TIME: 4/7/24 0449-1556  Cognitive Concerns/ Orientation : A&Ox4. Calm and cooperative.    BEHAVIOR & AGGRESSION TOOL COLOR: Green   CIWA SCORE: NA    ABNL VS/O2: VSS  weaned down to 1L O2 via NC.   MOBILITY: WC baseline. Lift or slider for transfers. BLE weak with contractions, baseline neuropathy. T/R q2h  PAIN MANAGMENT: chronic joint pain, Given PRN Tylenol x2  BOWEL/BLADDER: Purewick in place, hx of neurogenic bladder. No BM this shift. Pt refused  Senna, took miralax. Last bm 7 days ago    ABNL LAB/BG:  Hgb 9.5, , UA/UC (+)  DRAIN/DEVICES: PIV SL  TELEMETRY RHYTHM: NA   SKIN: Old, preexisting healed pressure wound on coccyx- notable erythema and small scabbing . BLE edema. WOC to see tomorrow.    TESTS/PROCEDURES: labs, WOC to see  D/C DAY/GOALS/PLACE: Pending  OTHER IMPORTANT INFO: MCALLISTER, lungs have fine crackles in bases. Coarse, non productive congested cough.

## 2024-04-08 ENCOUNTER — APPOINTMENT (OUTPATIENT)
Dept: GENERAL RADIOLOGY | Facility: CLINIC | Age: 69
DRG: 202 | End: 2024-04-08
Attending: HOSPITALIST
Payer: MEDICARE

## 2024-04-08 LAB
ACANTHOCYTES BLD QL SMEAR: NORMAL
AUER BODIES BLD QL SMEAR: NORMAL
BASO STIPL BLD QL SMEAR: NORMAL
BASOPHILS # BLD AUTO: 0 10E3/UL (ref 0–0.2)
BASOPHILS NFR BLD AUTO: 1 %
BITE CELLS BLD QL SMEAR: NORMAL
BLISTER CELLS BLD QL SMEAR: NORMAL
BURR CELLS BLD QL SMEAR: NORMAL
DACRYOCYTES BLD QL SMEAR: NORMAL
ELLIPTOCYTES BLD QL SMEAR: NORMAL
EOSINOPHIL # BLD AUTO: 0.1 10E3/UL (ref 0–0.7)
EOSINOPHIL NFR BLD AUTO: 4 %
ERYTHROCYTE [DISTWIDTH] IN BLOOD BY AUTOMATED COUNT: 11.9 % (ref 10–15)
FRAGMENTS BLD QL SMEAR: NORMAL
HCT VFR BLD AUTO: 29.3 % (ref 35–47)
HGB BLD-MCNC: 9.8 G/DL (ref 11.7–15.7)
HGB C CRYSTALS: NORMAL
HOWELL-JOLLY BOD BLD QL SMEAR: NORMAL
IMM GRANULOCYTES # BLD: 0 10E3/UL
IMM GRANULOCYTES NFR BLD: 0 %
LYMPHOCYTES # BLD AUTO: 1.2 10E3/UL (ref 0.8–5.3)
LYMPHOCYTES NFR BLD AUTO: 36 %
MCH RBC QN AUTO: 30.5 PG (ref 26.5–33)
MCHC RBC AUTO-ENTMCNC: 33.4 G/DL (ref 31.5–36.5)
MCV RBC AUTO: 91 FL (ref 78–100)
MONOCYTES # BLD AUTO: 0.2 10E3/UL (ref 0–1.3)
MONOCYTES NFR BLD AUTO: 5 %
NEUTROPHILS # BLD AUTO: 1.9 10E3/UL (ref 1.6–8.3)
NEUTROPHILS NFR BLD AUTO: 54 %
NEUTS HYPERSEG BLD QL SMEAR: NORMAL
NRBC # BLD AUTO: 0 10E3/UL
NRBC BLD AUTO-RTO: 0 /100
PLAT MORPH BLD: NORMAL
PLATELET # BLD AUTO: 142 10E3/UL (ref 150–450)
POLYCHROMASIA BLD QL SMEAR: NORMAL
RBC # BLD AUTO: 3.21 10E6/UL (ref 3.8–5.2)
RBC AGGLUT BLD QL: NORMAL
RBC MORPH BLD: NORMAL
ROULEAUX BLD QL SMEAR: NORMAL
SICKLE CELLS BLD QL SMEAR: NORMAL
SMUDGE CELLS BLD QL SMEAR: NORMAL
SPHEROCYTES BLD QL SMEAR: NORMAL
STOMATOCYTES BLD QL SMEAR: NORMAL
TARGETS BLD QL SMEAR: NORMAL
TOXIC GRANULES BLD QL SMEAR: NORMAL
VARIANT LYMPHS BLD QL SMEAR: NORMAL
WBC # BLD AUTO: 3.4 10E3/UL (ref 4–11)

## 2024-04-08 PROCEDURE — 99233 SBSQ HOSP IP/OBS HIGH 50: CPT | Performed by: HOSPITALIST

## 2024-04-08 PROCEDURE — 250N000013 HC RX MED GY IP 250 OP 250 PS 637: Performed by: HOSPITALIST

## 2024-04-08 PROCEDURE — 120N000001 HC R&B MED SURG/OB

## 2024-04-08 PROCEDURE — G0463 HOSPITAL OUTPT CLINIC VISIT: HCPCS

## 2024-04-08 PROCEDURE — 85025 COMPLETE CBC W/AUTO DIFF WBC: CPT | Performed by: HOSPITALIST

## 2024-04-08 PROCEDURE — 36415 COLL VENOUS BLD VENIPUNCTURE: CPT | Performed by: HOSPITALIST

## 2024-04-08 PROCEDURE — 250N000011 HC RX IP 250 OP 636: Performed by: PHYSICIAN ASSISTANT

## 2024-04-08 PROCEDURE — 73552 X-RAY EXAM OF FEMUR 2/>: CPT | Mod: RT

## 2024-04-08 PROCEDURE — 250N000013 HC RX MED GY IP 250 OP 250 PS 637: Performed by: PHYSICIAN ASSISTANT

## 2024-04-08 RX ORDER — NALOXONE HYDROCHLORIDE 0.4 MG/ML
0.4 INJECTION, SOLUTION INTRAMUSCULAR; INTRAVENOUS; SUBCUTANEOUS
Status: DISCONTINUED | OUTPATIENT
Start: 2024-04-08 | End: 2024-04-10 | Stop reason: HOSPADM

## 2024-04-08 RX ORDER — NALOXONE HYDROCHLORIDE 0.4 MG/ML
0.2 INJECTION, SOLUTION INTRAMUSCULAR; INTRAVENOUS; SUBCUTANEOUS
Status: DISCONTINUED | OUTPATIENT
Start: 2024-04-08 | End: 2024-04-10 | Stop reason: HOSPADM

## 2024-04-08 RX ADMIN — DULOXETINE HYDROCHLORIDE 80 MG: 30 CAPSULE, DELAYED RELEASE ORAL at 08:55

## 2024-04-08 RX ADMIN — ACETAMINOPHEN 650 MG: 325 TABLET, FILM COATED ORAL at 13:20

## 2024-04-08 RX ADMIN — GABAPENTIN 900 MG: 300 CAPSULE ORAL at 08:59

## 2024-04-08 RX ADMIN — ARMODAFINIL 200 MG: 50 TABLET ORAL at 08:58

## 2024-04-08 RX ADMIN — ACYCLOVIR 400 MG: 400 TABLET ORAL at 19:57

## 2024-04-08 RX ADMIN — POLYETHYLENE GLYCOL 3350 17 G: 17 POWDER, FOR SOLUTION ORAL at 08:59

## 2024-04-08 RX ADMIN — GABAPENTIN 900 MG: 300 CAPSULE ORAL at 20:00

## 2024-04-08 RX ADMIN — ACYCLOVIR 400 MG: 400 TABLET ORAL at 08:57

## 2024-04-08 RX ADMIN — PHENAZOPYRIDINE HYDROCHLORIDE 100 MG: 100 TABLET ORAL at 12:32

## 2024-04-08 RX ADMIN — CEFTRIAXONE SODIUM 2 G: 2 INJECTION, POWDER, FOR SOLUTION INTRAMUSCULAR; INTRAVENOUS at 21:38

## 2024-04-08 RX ADMIN — ACETAMINOPHEN 650 MG: 325 TABLET, FILM COATED ORAL at 09:11

## 2024-04-08 RX ADMIN — BACLOFEN 10 MG: 10 TABLET ORAL at 15:01

## 2024-04-08 RX ADMIN — BACLOFEN 40 MG: 20 TABLET ORAL at 21:42

## 2024-04-08 RX ADMIN — LEVOTHYROXINE SODIUM 100 MCG: 100 TABLET ORAL at 06:49

## 2024-04-08 RX ADMIN — ACETAMINOPHEN 650 MG: 325 TABLET, FILM COATED ORAL at 19:57

## 2024-04-08 RX ADMIN — FAMOTIDINE 40 MG: 20 TABLET, FILM COATED ORAL at 21:42

## 2024-04-08 RX ADMIN — ONDANSETRON 4 MG: 4 TABLET, ORALLY DISINTEGRATING ORAL at 13:36

## 2024-04-08 RX ADMIN — GUAIFENESIN 1200 MG: 600 TABLET, EXTENDED RELEASE ORAL at 08:57

## 2024-04-08 RX ADMIN — OXYCODONE HYDROCHLORIDE 2.5 MG: 5 TABLET ORAL at 15:05

## 2024-04-08 RX ADMIN — BACLOFEN 10 MG: 10 TABLET ORAL at 08:58

## 2024-04-08 RX ADMIN — GUAIFENESIN 1200 MG: 600 TABLET, EXTENDED RELEASE ORAL at 20:00

## 2024-04-08 RX ADMIN — LOSARTAN POTASSIUM 50 MG: 50 TABLET, FILM COATED ORAL at 21:43

## 2024-04-08 RX ADMIN — AZITHROMYCIN MONOHYDRATE 500 MG: 500 INJECTION, POWDER, LYOPHILIZED, FOR SOLUTION INTRAVENOUS at 22:10

## 2024-04-08 RX ADMIN — LOSARTAN POTASSIUM 25 MG: 25 TABLET, FILM COATED ORAL at 08:58

## 2024-04-08 RX ADMIN — BACLOFEN 10 MG: 10 TABLET ORAL at 12:32

## 2024-04-08 RX ADMIN — PHENAZOPYRIDINE HYDROCHLORIDE 100 MG: 100 TABLET ORAL at 08:57

## 2024-04-08 ASSESSMENT — ACTIVITIES OF DAILY LIVING (ADL)
ADLS_ACUITY_SCORE: 63
ADLS_ACUITY_SCORE: 63
ADLS_ACUITY_SCORE: 57
ADLS_ACUITY_SCORE: 63
ADLS_ACUITY_SCORE: 57
ADLS_ACUITY_SCORE: 57
ADLS_ACUITY_SCORE: 63
ADLS_ACUITY_SCORE: 57
ADLS_ACUITY_SCORE: 63
ADLS_ACUITY_SCORE: 57
ADLS_ACUITY_SCORE: 57
ADLS_ACUITY_SCORE: 63
ADLS_ACUITY_SCORE: 63
ADLS_ACUITY_SCORE: 57
ADLS_ACUITY_SCORE: 53
ADLS_ACUITY_SCORE: 63
ADLS_ACUITY_SCORE: 63
ADLS_ACUITY_SCORE: 57
ADLS_ACUITY_SCORE: 57

## 2024-04-08 NOTE — PLAN OF CARE
Goal Outcome Evaluation:    SUMMARY: ethel    DATE & TIME: 4/7/24, [id number]      Cognitive Concerns/ Orientation : A/Ox4     BEHAVIOR & AGGRESSION TOOL COLOR: Green    CIWA SCORE: NA     ABNL VS/O2: mild  hypertension on 1L O2    MOBILITY: A2 Lift, T&R q2hr    PAIN MANAGMENT: prn tylenol given    DIET: Regular diet    BOWEL/BLADDER: purewick in place, no BM this shift, refused senna    ABNL LAB/BG: hgb 9.5, plt 116, UA+    DRAIN/DEVICES: purewick, R PIV SL    TELEMETRY RHYTHM: NA    SKIN: healing ulcer on coccyx, preexisting; BLE edema    TESTS/PROCEDURES: WOC consult tmrw    D/C DAY/GOALS/PLACE: Pending    OTHER IMPORTANT INFO:

## 2024-04-08 NOTE — PLAN OF CARE
Goal Outcome Evaluation:    [id number]    Orientation: A&O x4  Activity: Ax2 w/ lift. T/R q2h while awake, baseline wheelchair  Diet/BS Checks: regular  Tele:  n/a  IV Access/Drains: R PIV SL w/ intermittent abx  Pain Management: denies pain.  Abnormal VS/Results: VSS on RA, weaned off oxygen. SpO2 90-95% on RA.   Bowel/Bladder: Incontinent of B/B. Purewick in place, no BM this shift. Urinal orange/ fausto color.   Skin/Wounds: healed PI on coccyx w/ small scabbing.   Consults: WOC  D/C Disposition: pending   Other Info: frequent nonproductive cough, continuous pulse ox on.

## 2024-04-08 NOTE — CONSULTS
WOC RN Consult Note    Today's Visit: [facility] was consulted for sacrum.  Patient has some scar tissue noted, no current concerns. Mepilex in place for prevention.  [facility] will sign off.  REVA Figueroa, RN, PHN, HNB-BC, CWOCN  Pager no longer is use, please contact through [employer] group: [facility]

## 2024-04-08 NOTE — PLAN OF CARE
Summary: Hypoxia secondary to acute bronchitis  DATE & TIME: 4/8/24 AM shift  Cognitive Concerns/ Orientation : A&Ox4. Calm and cooperative.    BEHAVIOR & AGGRESSION TOOL COLOR: Green   ABNL VS/O2: VSS. Pt was weaned off during the night. Had to be put back on 1L O2 via NC, due to oxygen saturation 85-89%. LS with crackles, infrequent cough. Encouraged to use IS  MOBILITY: WC baseline. Lift or slider for transfers. BLE weak with contractions, baseline neuropathy. T/R q2h. Pt was offered to sit in her wheelchair or chair, but declined  PAIN MANAGMENT: pt c/o R hip/knee pain, managed with  PRN Tylenol x2. Also, PRN Oxycodone has been added this afternoon, not given since pt left for xray.    BOWEL/BLADDER: Purewick in place, hx of neurogenic bladder. No BM this shift. Abdomen soft and tender to palpitation. Pt is on bowel regiment, taking scheduled miralax, declined senna . Poor appetite, dislikes some food from cafeteria. Encouraged fluid intake.   ABNL LAB/BG:  no new labs  DRAIN/DEVICES: PIV SL  TELEMETRY RHYTHM: NA   SKIN: Old, preexisting healed pressure wound on coccyx- notable erythema and scar tissue. BLE edema.   TESTS/PROCEDURES: X-ray done-right femur, results pending.  D/C DAY/GOALS/PLACE: Pending, once off oxygen and pain well controlled.   OTHER IMPORTANT INFO: n/a

## 2024-04-08 NOTE — PROGRESS NOTES
[facility]    Medicine Progress Note - Hospitalist Service    Date of Admission:  4/5/2024    Assessment & Plan   Marylee Woods is a 68 year old female with medical history significant for Current E. Coli UTI with history of reoccurrence (on day 4 of Cipro), MS, Neurogenic bladder, Urinary incontinence, CKD stage 3, HTN, HLP, GERD, Postablative hypothyroidism, HSV, Osteopenia, MDD with anxiety, Central sleep apnea, Closed pressure injury of the sacrum, Lymphedema, Known lung nodules, History of left sided breast cancer who was admitted on 4/5/2024 due to acute hypoxic respiratory failure secondary to acute bronchitis.  She had cough, fever, high BP, and hypoxia starting 3 days prior to presentation.     As of 4/8, we are working on weaning O2 and hoping for discharge home 4/9.         Acute hypoxic respiratory failure  *Baseline patient does not require supplemental O2.  Currently still requiring 1-2 L/m per nasal cannula as of 4/6  *D Dimer 1.12  *CT PE was negative for PE but revealed inflammatory bronchial wall thickening with scattered endoluminal mucous plugs and mild atelectasis   - Supplemental O2 available; wean as able.  Goal O2 sat 90% or higher.  - Encourage use of IS/Flutter valve.    - Continue management as below.    - RCAT consult requested.    - 4/7 and 4/8 down to 1L, and was briefly on RA, continue IS and activity/weaning as able   - she is agreeable to trying to get off O2 but if necessary is willing to have it at home 4/9     Acute bronchitis  *With acute hypoxic respiratory failure with reported fevers (Max of 103) and persistent productive cough she was started on IV abx   - IV Ceftriaxone 1 g/d & IV Azithromycin 500 mg/d.     --> consider PO 4/9 or at discharge  - Mucinex 1200 mg BID.    - Encourage PO fluids.    - encourage PO intake     E. Coli UTI   History of recurrent UTI  *UCx from 4/2 was sensitive to everything but ampicillin.    - Stopped PTA Cipro in favor  of IV antibiotics above.         R thigh pain  Hx of R distal femur fracture (nonoperative managed Nov 2023)  Hx of R hip surgery  Reports no new or recent trauma, but has been having R thigh pain, no new swelling endorsed.  - XR R femur, low suspicion of acute findings - likely will need to have ortho follow up as an outpatient after discharge    Pancytopenia  *likely in part with chronic disease, CKD, MS etc and somewhat in relation to infection and illness  *Hgb 8.9 - mildly lower than baseline and likely somewhat dilutional. WBC 3.2. Platelets 115.   - no bleeding concerns  - follow CBC - will need outpatient follow up and recheck also     Closed sacral pressure injury  - [facility] Nurse consult requested.    - Low air flow mattress ordered.    - Patient care order in place to reposition patient every 2 hours while awake.       Troponin elevation, likely mild demand ischemia  *Troponin elevated at 34 and on repeat remained at 34.  Patient denied chest pain.  EKG with normal sinus rhythm.  No further work-up at this time.       MS  *Wheelchair bound at baseline.    - Resumed on PTA baclofen (10 mg every morning, 10 mg every afternoon as needed and 40 mg at bedtime).              - added daily prn 10 mg baclofen (confirmed with pt/spouse)  - Resumed on PTA gabapentin 600 mg QID.    - Hold PTA BRIUMVI injection and resume at discharge.       Neurogenic bladder  Urinary incontinence  *Noted on chart review.       Chronic kidney disease stage 3  Baseline creatinine 0.5-0.9.       HTN  - Resumed on PTA losartan 25 mg every morning and 50 mg every evening.  Hold parameters in place.    - PRN IV hydralazine 10 mg every 4 hours for SBP GREATER THAN 180.       HLP  *Noted on chart review.  Not currently on any medications.       GERD  - Resumed on PTA Pepcid 40 mg at bedtime.         Postablative hypothyroidism  - Resumed on PTA synthroid 100 mcg (takes 6 times a week).       Lymphedema  *Utilizes compression stocking at home  "and working with home health.    - PCD's ordered.       HSV  - Resumed on PTA acyclovir 400 mg every 12 hours.       Osteopenia  - Hold PTA supplements and resume at discharge.       Major depressive disorder with anxiety  - Resumed on PTA Cymbalta 80 mg/d.     Central sleep apnea with day time drowsiness  - B- BiPAP ordered with home settings.    - Resumed on PTA Armodafinil 200 mg every morning.       Known lung nodules  *Noted on chart review but not reported on Chest CT/PE study completed in the ED.  Recommend continued follow-up with PCP.       History of left sided breast cancer  *S/p lumpectomy and radiation therapy.  No interventions.            Diet: Combination Diet Regular Diet Adult    DVT Prophylaxis: Pneumatic Compression Devices  Verduzco Catheter: Not present  Lines: None     Cardiac Monitoring: None  Code Status: Full Code      Clinically Significant Risk Factors                # Thrombocytopenia: Lowest platelets = 115 in last 2 days, will monitor for bleeding   # Hypertension: Noted on problem list        # Obesity: Estimated body mass index is 30.53 kg/m  as calculated from the following:    Height as of this encounter: 1.727 m (5' 8\").    Weight as of this encounter: 91.1 kg (200 lb 12.8 oz)., PRESENT ON ADMISSION     # Financial/Environmental Concerns:           Disposition Plan      Expected Discharge Date: 04/09/2024                    Paco Mata MD  Hospitalist Service  [facility]  Securely message with [employer] (more info)  Text page via [facility] Paging/Directory   ______________________________________________________________________    Interval History   Seen and examined.  RA / 1L O2 at times. Cough somewhat improved. No fevers or chills. R thigh pain despite no recent trauma. Denies swelling or redness. Discussed hx of femur fx November last year. She has since seen ortho in clinic - we acknowledge that she may need to follow up with them as an " outpatient.      Physical Exam   Vital Signs: Temp: (!) 96.6  F (35.9  C) Temp src: Oral BP: (!) 149/63 Pulse: 77   Resp: 16 SpO2: 91 % O2 Device: Nasal cannula Oxygen Delivery: 1 LPM  Weight: 200 lbs 12.8 oz    Gen: NAD, pleasant  HEENT: EOMI, MMM  Resp: no focal crackles,  a few wheezes, no increased work of resp  CV: S1S2 heard, reg rhythm, reg rate  Abdo: soft, nontender, nondistended, bowel sounds present  Ext: calves nontender, well perfused, R thigh without erythema or new edema  Neuro: aa, conversant, CN grossly intact, no facial asymmetry      Medical Decision Making       52 MINUTES SPENT BY ME on the date of service doing chart review, history, exam, documentation & further activities per the note.      Data

## 2024-04-08 NOTE — PLAN OF CARE
Summary: Hypoxia secondary to acute bronchitis  DATE & TIME: 4/8/24 PM shift  Cognitive Concerns/ Orientation : A&Ox4. Calm and cooperative.    BEHAVIOR & AGGRESSION TOOL COLOR: Green   ABNL VS/O2: VSS on 1 L of oxygen, weaned off this evening-on continue pulse ox-sating 90%-93%. LS with crackles, infrequent cough. Encouraged to use IS  MOBILITY: WC baseline. Lift or slider for transfers. BLE weak with contractions, baseline neuropathy. T/R q2h. Pt was offered to sit in her wheelchair or chair, but declined  PAIN MANAGMENT: pt c/o R hip/knee pain, repositioned for comfort. Also, PRN Oxycodone and tylenol available.  BOWEL/BLADDER: Purewick in place, No BM this shift. Abdomen soft and tender to palpitation. Pt is on bowel regiment, taking scheduled miralax, declined senna. Ate well for dinner. Encouraged fluid intake.   ABNL LAB/BG: WBC 3.4. Hg 9.8. Platelets 142  DRAIN/DEVICES: PIV SL  TELEMETRY RHYTHM: NA   SKIN: Old, preexisting healed pressure wound on coccyx- notable erythema and scar tissue. BLE edema.   TESTS/PROCEDURES: X-ray done-right femur, negative for any acute or new issues-MD aware.   D/C DAY/GOALS/PLACE: Pending, once off oxygen and pain well controlled.   OTHER IMPORTANT INFO: n/a

## 2024-04-09 LAB
ERYTHROCYTE [DISTWIDTH] IN BLOOD BY AUTOMATED COUNT: 11.9 % (ref 10–15)
HCT VFR BLD AUTO: 30.2 % (ref 35–47)
HGB BLD-MCNC: 10 G/DL (ref 11.7–15.7)
MCH RBC QN AUTO: 30.7 PG (ref 26.5–33)
MCHC RBC AUTO-ENTMCNC: 33.1 G/DL (ref 31.5–36.5)
MCV RBC AUTO: 93 FL (ref 78–100)
PLATELET # BLD AUTO: 162 10E3/UL (ref 150–450)
RBC # BLD AUTO: 3.26 10E6/UL (ref 3.8–5.2)
WBC # BLD AUTO: 3.1 10E3/UL (ref 4–11)

## 2024-04-09 PROCEDURE — 250N000011 HC RX IP 250 OP 636: Performed by: PHYSICIAN ASSISTANT

## 2024-04-09 PROCEDURE — 99232 SBSQ HOSP IP/OBS MODERATE 35: CPT | Performed by: HOSPITALIST

## 2024-04-09 PROCEDURE — 85027 COMPLETE CBC AUTOMATED: CPT | Performed by: HOSPITALIST

## 2024-04-09 PROCEDURE — 36415 COLL VENOUS BLD VENIPUNCTURE: CPT | Performed by: HOSPITALIST

## 2024-04-09 PROCEDURE — 250N000013 HC RX MED GY IP 250 OP 250 PS 637: Performed by: PHYSICIAN ASSISTANT

## 2024-04-09 PROCEDURE — 120N000001 HC R&B MED SURG/OB

## 2024-04-09 PROCEDURE — 250N000009 HC RX 250: Performed by: HOSPITALIST

## 2024-04-09 PROCEDURE — 94640 AIRWAY INHALATION TREATMENT: CPT

## 2024-04-09 RX ORDER — IPRATROPIUM BROMIDE AND ALBUTEROL SULFATE 2.5; .5 MG/3ML; MG/3ML
3 SOLUTION RESPIRATORY (INHALATION) ONCE
Status: COMPLETED | OUTPATIENT
Start: 2024-04-09 | End: 2024-04-09

## 2024-04-09 RX ORDER — IPRATROPIUM BROMIDE AND ALBUTEROL SULFATE 2.5; .5 MG/3ML; MG/3ML
1 SOLUTION RESPIRATORY (INHALATION) 2 TIMES DAILY
Qty: 90 ML | Refills: 0 | Status: SHIPPED | OUTPATIENT
Start: 2024-04-09 | End: 2024-04-10

## 2024-04-09 RX ORDER — GABAPENTIN 300 MG/1
900 CAPSULE ORAL 2 TIMES DAILY
Status: SHIPPED
Start: 2024-04-09

## 2024-04-09 RX ORDER — LEVALBUTEROL INHALATION SOLUTION 1.25 MG/3ML
1.25 SOLUTION RESPIRATORY (INHALATION)
Status: DISCONTINUED | OUTPATIENT
Start: 2024-04-09 | End: 2024-04-10 | Stop reason: HOSPADM

## 2024-04-09 RX ORDER — IPRATROPIUM BROMIDE AND ALBUTEROL SULFATE 2.5; .5 MG/3ML; MG/3ML
3 SOLUTION RESPIRATORY (INHALATION) ONCE
Status: DISCONTINUED | OUTPATIENT
Start: 2024-04-09 | End: 2024-04-09

## 2024-04-09 RX ORDER — POLYETHYLENE GLYCOL 3350 17 G/17G
17 POWDER, FOR SOLUTION ORAL DAILY PRN
Status: SHIPPED
Start: 2024-04-09

## 2024-04-09 RX ORDER — CEFUROXIME AXETIL 500 MG/1
500 TABLET ORAL 2 TIMES DAILY
Qty: 8 TABLET | Refills: 0 | Status: SHIPPED | OUTPATIENT
Start: 2024-04-09 | End: 2024-04-17

## 2024-04-09 RX ORDER — BUDESONIDE 1 MG/2ML
1 INHALANT ORAL ONCE
Status: COMPLETED | OUTPATIENT
Start: 2024-04-09 | End: 2024-04-09

## 2024-04-09 RX ORDER — LEVOTHYROXINE SODIUM 100 UG/1
100 TABLET ORAL DAILY
Status: SHIPPED
Start: 2024-04-09 | End: 2024-05-08

## 2024-04-09 RX ORDER — BUDESONIDE 0.5 MG/2ML
0.5 INHALANT ORAL DAILY
Qty: 28 ML | Refills: 0 | Status: SHIPPED | OUTPATIENT
Start: 2024-04-09 | End: 2024-04-10

## 2024-04-09 RX ORDER — BUDESONIDE 0.5 MG/2ML
0.5 INHALANT ORAL 2 TIMES DAILY
Status: DISCONTINUED | OUTPATIENT
Start: 2024-04-10 | End: 2024-04-10 | Stop reason: HOSPADM

## 2024-04-09 RX ADMIN — BACLOFEN 10 MG: 10 TABLET ORAL at 08:26

## 2024-04-09 RX ADMIN — LEVOTHYROXINE SODIUM 100 MCG: 100 TABLET ORAL at 06:19

## 2024-04-09 RX ADMIN — ACYCLOVIR 400 MG: 400 TABLET ORAL at 20:03

## 2024-04-09 RX ADMIN — FAMOTIDINE 40 MG: 20 TABLET, FILM COATED ORAL at 21:08

## 2024-04-09 RX ADMIN — ACETAMINOPHEN 650 MG: 325 TABLET, FILM COATED ORAL at 20:07

## 2024-04-09 RX ADMIN — BUDESONIDE 1 MG: 1 INHALANT ORAL at 15:59

## 2024-04-09 RX ADMIN — BACLOFEN 10 MG: 10 TABLET ORAL at 12:33

## 2024-04-09 RX ADMIN — GUAIFENESIN 1200 MG: 600 TABLET, EXTENDED RELEASE ORAL at 08:27

## 2024-04-09 RX ADMIN — DULOXETINE HYDROCHLORIDE 80 MG: 30 CAPSULE, DELAYED RELEASE ORAL at 08:25

## 2024-04-09 RX ADMIN — GUAIFENESIN 1200 MG: 600 TABLET, EXTENDED RELEASE ORAL at 20:03

## 2024-04-09 RX ADMIN — ACETAMINOPHEN 650 MG: 325 TABLET, FILM COATED ORAL at 08:38

## 2024-04-09 RX ADMIN — LOSARTAN POTASSIUM 25 MG: 25 TABLET, FILM COATED ORAL at 08:27

## 2024-04-09 RX ADMIN — ARMODAFINIL 200 MG: 50 TABLET ORAL at 08:27

## 2024-04-09 RX ADMIN — ACETAMINOPHEN 650 MG: 325 TABLET, FILM COATED ORAL at 13:25

## 2024-04-09 RX ADMIN — AZITHROMYCIN MONOHYDRATE 500 MG: 500 INJECTION, POWDER, LYOPHILIZED, FOR SOLUTION INTRAVENOUS at 20:36

## 2024-04-09 RX ADMIN — CEFTRIAXONE SODIUM 2 G: 2 INJECTION, POWDER, FOR SOLUTION INTRAMUSCULAR; INTRAVENOUS at 20:03

## 2024-04-09 RX ADMIN — ACYCLOVIR 400 MG: 400 TABLET ORAL at 08:25

## 2024-04-09 RX ADMIN — IPRATROPIUM BROMIDE AND ALBUTEROL SULFATE 3 ML: .5; 3 SOLUTION RESPIRATORY (INHALATION) at 15:59

## 2024-04-09 RX ADMIN — BACLOFEN 40 MG: 20 TABLET ORAL at 21:08

## 2024-04-09 RX ADMIN — GABAPENTIN 900 MG: 300 CAPSULE ORAL at 20:03

## 2024-04-09 RX ADMIN — LOSARTAN POTASSIUM 50 MG: 50 TABLET, FILM COATED ORAL at 21:08

## 2024-04-09 RX ADMIN — GABAPENTIN 900 MG: 300 CAPSULE ORAL at 08:27

## 2024-04-09 ASSESSMENT — ACTIVITIES OF DAILY LIVING (ADL)
ADLS_ACUITY_SCORE: 63

## 2024-04-09 NOTE — PROGRESS NOTES
Patient has been assessed for Home Oxygen needs. Oxygen readings:    *Pulse oximetry (SpO2) = 88% on room air at rest while awake.    *SpO2 improved to 93% on 1 liters/minute at rest.    *SpO2 = 88% on room air during activity/with exercise.    *SpO2 improved to 94% on 1 liters/minute during activity/with exercise.

## 2024-04-09 NOTE — PLAN OF CARE
Goal Outcome Evaluation:  Summary: Hypoxia secondary to acute bronchitis  DATE & TIME: 4/9/24 5965-2776    Cognitive Concerns/ Orientation : Aox4, calm and cooperative  BEHAVIOR & AGGRESSION TOOL COLOR: green  ABNL VS/O2: Hypertensive, on 1L nasal cannula weaned to room air during day shift.  MOBILITY: Lift, turn and repo Q2, wheelchair bound at baseline, refuses repositioning at times  PAIN MANAGMENT: PRN oxy and Tylenol available  DIET: Reg  BOWEL/BLADDER: Incontinent of urine, purewick in place, No BM for 9 days- MD aware, on bowel regimen, at baseline only has 1 BM per week   ABNL LAB/BG: WBC 3.1  DRAIN/DEVICES: R PIV SL with intermittent antibiotics  SKIN: Healed pressure injury on sacrum with scar tissue, scattered bruising  TESTS/PROCEDURES: None  D/C DATE: Discharging home tomorrow w/ home O2 and nebs,  OTHER IMPORTANT INFO: On continuous pulse ox, IV azithromycin and Rocephin Q24

## 2024-04-09 NOTE — DISCHARGE SUMMARY
"[facility]  Hospitalist Discharge Summary      Date of Admission:  4/5/2024  Date of Discharge:  4/9/2024  Discharging Provider: Jose Decker MD  Discharge Service: Hospitalist Service    Discharge Diagnoses   Acute hypoxic respiratory failure  Acute bronchitis  E. Coli urinary tract infection   Pancytopenia   Closed sacral pressure injury present on admission   Type 2 non-ST-elevation myocardial infarction     History of   Multiple sclerosis  Neurogenic bladder  Urinary incontinence  Recurrent urinary tract infections  Chronic kidney disease stage 3  Hyperlipidemia   Gastroesophageal reflux disease     Postablative hypothyroidism  Lymphedema  HSV  Osteopenia  Major depressive disorder with anxiety  Central sleep apnea with day time drowsiness  Lung nodules  Left sided breast cancer      Clinically Significant Risk Factors     # Obesity: Estimated body mass index is 30.53 kg/m  as calculated from the following:    Height as of this encounter: 1.727 m (5' 8\").    Weight as of this encounter: 91.1 kg (200 lb 12.8 oz).       Follow-ups Needed After Discharge   Follow-up Appointments     Follow-up and recommended labs and tests       Follow up with primary care provider, Carolina Pulliam, within 7 days   for hospital follow- up.  The following labs/tests are recommended:   complete blood count and basic metabolic profile.            Unresulted Labs Ordered in the Past 30 Days of this Admission       No orders found from 3/6/2024 to 4/6/2024.            Discharge Disposition   Discharged to home  Condition at discharge: Stable    Hospital Course   Marylee Woods is a 68 year old female with medical history significant for Current E. Coli UTI with history of reoccurrence (on day 4 of Cipro), MS, Neurogenic bladder, Urinary incontinence, CKD stage 3, HTN, HLP, GERD, Postablative hypothyroidism, HSV, Osteopenia, MDD with anxiety, Central sleep apnea, Closed pressure injury of the sacrum, " Lymphedema, Known lung nodules, History of left sided breast cancer who was admitted on 4/5/2024 due to acute hypoxic respiratory failure secondary to acute bronchitis.  She had cough, fever, high BP, and hypoxia starting 3 days prior to presentation.   She has improved and will discharge home on 1 LPM oxygen and an oral antibiotic along with inhaled bronchodilators and inhaled steroid.     Acute hypoxic respiratory failure  Baseline patient does not require supplemental O2.  Currently still requiring 1-2 L/m per nasal cannula as of 4/6  *D Dimer 1.12  *CT PE was negative for PE but revealed inflammatory bronchial wall thickening with scattered endoluminal mucous plugs and mild atelectasis   -She still needs 1 LPM to go home  -Try inhaled bronchodilators at home as well   -Follow up with primary care provider      Acute bronchitis  CT pulmonary embolism   1.  No pulmonary emboli.  2.  Inflammatory bronchial wall thickening with scattered endoluminal mucous plugs and mild atelectasis.  With acute hypoxic respiratory failure with reported fevers (Max of 103) and persistent productive cough she was started on IV abx.   -Discharge on cefuroxime, Duonebs and nebulized budesonide  -Follow up with primary care provider      E. Coli UTI   History of recurrent UTI  Treated with ceftriaxone.  -Follow-up with primary care provider         R thigh pain  Hx of R distal femur fracture (nonoperative managed Nov 2023)  Hx of R hip surgery  Reports no new or recent trauma, but has been having R thigh pain, no new swelling endorsed.  - XR R femur, low suspicion of acute findings -   -Follow up with primary care provider      Pancytopenia  Recent Labs   Lab 04/09/24  1115 04/08/24  1700 04/07/24  0902 04/06/24  1029 04/05/24  1307   HGB 10.0* 9.8* 9.5* 8.9* 10.7*      Recent Labs   Lab 04/09/24  1115 04/08/24  1700 04/07/24  0902 04/06/24  1029 04/05/24  1307    142* 116* 115* 178          Likely in part with chronic disease,  CKD, MS etc and somewhat in relation to infection and illness  Hgb 8.9 - mildly lower than baseline and likely somewhat dilutional. WBC 3.2. Platelets 115.   -follow up with primary care provider      Closed sacral pressure injury  [facility] Nurse consult requested.    Low air flow mattress ordered.       Troponin elevation, likely mild demand ischemia  Troponin elevated at 34 and on repeat remained at 34.  Patient denied chest pain.  EKG with normal sinus rhythm.  No further work-up at this time.       MS  Wheelchair bound at baseline.    -Continue PTA baclofen and gabapentin  -Resume BRIUMVI injection '    Neurogenic bladder  Urinary incontinence  Noted on chart review.       Chronic kidney disease stage 3  Baseline creatinine 0.5-0.9.       HTN  -continue PTA losartan 25 mg every morning and 50 mg every evening.     HLP  Noted on chart review.  Not currently on any medications.       GERD  - continue PTA Pepcid 40 mg at bedtime.         Postablative hypothyroidism  -Continue PTA synthroid 100 mcg (takes 6 times a week).       Lymphedema  -Utilizes compression stocking at home and working with home health     HSV  - continue PTA acyclovir 400 mg every 12 hours.       Osteopenia  -Resume  PTA supplements      Major depressive disorder with anxiety  -continue PTA Cymbalta 80 mg/d.     Central sleep apnea with day time drowsiness  -continue home BiLevel positive airway pressure     -continue PTA Armodafinil 200 mg every morning.       Known lung nodules  *Noted on chart review but not reported on Chest CT/PE study completed in the ED.  Recommend continued follow-up with PCP.       History of left sided breast cancer  *S/p lumpectomy and radiation therapy.  No interventions.    Consultations This Hospital Stay   WOUND OSTOMY CONTINENCE NURSE  IP CONSULT  NURSING TO CONSULT FOR VASCULAR ACCESS CARE IP CONSULT  CARE MANAGEMENT / SOCIAL WORK IP CONSULT    Code Status   Full Code    Time Spent on this Encounter   Jose COLON  KELLIE Decker MD, personally saw the patient today and spent greater than 30 minutes discharging this patient.       Jose Decker MD  [place] [address] [facility]  [address] [place] [address]  Phone: [phone]  ______________________________________________________________________    Physical Exam   Vital Signs: Temp: 98.2  F (36.8  C) Temp src: Oral BP: (!) 166/77 Pulse: 88   Resp: 18 SpO2: 91 % O2 Device: None (Room air) Oxygen Delivery: 1 LPM  Weight: 200 lbs 12.8 oz  Lungs- some rhonchi, no wheezing       Primary Care Physician   Carolina Pulliam    Discharge Orders      Reason for your hospital stay    bronchitis     Follow-up and recommended labs and tests     Follow up with primary care provider, Carolina Pulliam, within 7 days for hospital follow- up.  The following labs/tests are recommended: complete blood count and basic metabolic profile.     Activity    Your activity upon discharge: activity as tolerated     Oxygen Adult/Peds    Oxygen Documentation  I certify that this patient, Marylee Woods has been under my care (or a nurse practitioner or physican's assistant working with me). This is the face-to-face encounter for oxygen medical necessity.      At the time of this encounter, I have reviewed the qualifying testing and have determined that supplemental oxygen is reasonable and necessary and is expected to improve the patient's condition in a home setting.         Patient has continued oxygen desaturation due to Acute Respiratory Failure J96.01.    If portability is ordered, is the patient mobile within the home? yes    Was this visit performed as a telehealth visit: No    {DME TeleHealth Reminder (Optional) For telehealth visits to be accepted, there must be a valid reason documented as to why an in-person visit could not be completed. If O2 Sat testing is needed, pt must have an in-person visit with nurse or provider.:     Miscellaneous DME Order     DME Documentation:   Describe the reason for need to support medical necessity: nebulized medications for bronchitis.     I, the undersigned, certify that the above prescribed supplies are medically necessary for this patient and is both reasonable and necessary in reference to accepted standards of medical and necessary in reference to accepted standards of medical practice in the treatment of this patient's condition and is not prescribed as a convenience.     Miscellaneous DME Order    DME Documentation:   Describe the reason for need to support medical necessity: home oxygen saturation monitoring.     I, the undersigned, certify that the above prescribed supplies are medically necessary for this patient and is both reasonable and necessary in reference to accepted standards of medical and necessary in reference to accepted standards of medical practice in the treatment of this patient's condition and is not prescribed as a convenience.     Diet    Follow this diet upon discharge: Orders Placed This Encounter      Combination Diet Regular Diet Adult       Significant Results and Procedures   Most Recent 3 CBC's:  Recent Labs   Lab Test 04/09/24  1115 04/08/24  1700 04/07/24  0902   WBC 3.1* 3.4* 2.5*   HGB 10.0* 9.8* 9.5*   MCV 93 91 93    142* 116*     Most Recent 3 BMP's:  Recent Labs   Lab Test 04/07/24  0902 04/06/24  1029 04/05/24  1307    137 138   POTASSIUM 3.9 3.8 4.0   CHLORIDE 107 102 100   CO2 25 20* 26   BUN 12.2 14.1 14.6   CR 0.67 0.74 0.84   ANIONGAP 11 15 12   JORDON 8.7* 8.3* 9.1   GLC 92 75 96   ,   Results for orders placed or performed during the hospital encounter of 04/05/24   XR Chest 2 Views    Narrative    XR CHEST 2 VIEWS   4/5/2024 1:51 PM     HISTORY: Cough    COMPARISON: Chest radiograph 11/20/2023      Impression    IMPRESSION: No acute cardiopulmonary disease.    SHI SEWELL MD         SYSTEM ID:  [id number]   CT Chest Pulmonary Embolism w Contrast    Narrative    EXAM:  CT CHEST PULMONARY EMBOLISM W CONTRAST  LOCATION: [facility]  DATE: 4/5/2024    INDICATION: SOB, dimer up  COMPARISON: None.  TECHNIQUE: CT chest pulmonary angiogram during arterial phase injection of IV contrast. Multiplanar reformats and MIP reconstructions were performed. Dose reduction techniques were used.   CONTRAST: 58 mL Isovue 370    FINDINGS:  ANGIOGRAM CHEST: Pulmonary arteries are normal caliber and negative for pulmonary emboli. Thoracic aorta is negative for dissection. No CT evidence of right heart strain.    LUNGS AND PLEURA: Moderate diffuse inflammatory bronchial wall thickening. Foci of endobronchial mucous plugs or secretions are seen scattered within lower lobes. Slight groundglass opacity posteriorly should represent dependent atelectasis. Mild   scarring within lingula.    MEDIASTINUM/AXILLAE: Patulous esophagus. No adenopathy.    CORONARY ARTERY CALCIFICATION: Mild.    UPPER ABDOMEN: Cholecystectomy.    MUSCULOSKELETAL: Normal.      Impression    IMPRESSION:  1.  No pulmonary emboli.  2.  Inflammatory bronchial wall thickening with scattered endoluminal mucous plugs and mild atelectasis.   XR Femur Right 2 Views    Narrative    XR FEMUR RIGHT 2 VIEWS 4/8/2024 2:19 PM     HISTORY: pain, femur fracture in Nov, Hx R hip surgery also    COMPARISON: 11/17/2023         Impression    IMPRESSION: Progression of bony remodeling of a fracture of the distal  femur seen on the old study. There is still posttraumatic irregularity  and deformity at the fracture site with mild impaction but this is  unchanged. No significant knee joint effusion. Stable postoperative  changes of plate and screw fixation of an old healed fracture of the  right hip. This is in good alignment. Diffuse demineralization. No new  fracture. Chronic calcification within the subcutaneous fat  superficial to the greater trochanter, stable.    JOSEFA MATA MD         SYSTEM ID:  [id number]     *Note: Due to a  large number of results and/or encounters for the requested time period, some results have not been displayed. A complete set of results can be found in Results Review.       Discharge Medications   Current Discharge Medication List        START taking these medications    Details   budesonide (PULMICORT) 0.5 MG/2ML neb solution Take 2 mLs (0.5 mg) by nebulization daily  Qty: 28 mL, Refills: 0    Associated Diagnoses: Cough, unspecified type      cefuroxime (CEFTIN) 500 MG tablet Take 1 tablet (500 mg) by mouth 2 times daily  Qty: 8 tablet, Refills: 0    Associated Diagnoses: Cough, unspecified type      ipratropium - albuterol 0.5 mg/2.5 mg/3 mL (DUONEB) 0.5-2.5 (3) MG/3ML neb solution Take 1 vial (3 mLs) by nebulization 2 times daily  Qty: 90 mL, Refills: 0    Associated Diagnoses: Cough, unspecified type           CONTINUE these medications which have CHANGED    Details   gabapentin (NEURONTIN) 300 MG capsule Take 3 capsules (900 mg) by mouth 2 times daily    Associated Diagnoses: MS (multiple sclerosis) (H)      levothyroxine (SYNTHROID/LEVOTHROID) 100 MCG tablet Take 1 tablet (100 mcg) by mouth daily SIX TIMES A WEEK (skip Sundays)    Associated Diagnoses: Postablative hypothyroidism      polyethylene glycol (MIRALAX) 17 GM/Dose powder Take 17 g by mouth daily as needed for constipation    Associated Diagnoses: Constipation, unspecified constipation type           CONTINUE these medications which have NOT CHANGED    Details   acyclovir (ZOVIRAX) 400 MG tablet Take 1 tablet (400 mg) by mouth every 12 hours  Qty: 180 tablet, Refills: 3    Associated Diagnoses: HSV (herpes simplex virus) infection      Armodafinil 200 MG TABS Take 200 mg by mouth every morning      !! baclofen (LIORESAL) 10 MG tablet Take 40 mg by mouth at bedtime In addition to morning and noon doses      !! baclofen (LIORESAL) 10 MG tablet Take 10 mg by mouth 2 times daily 0800 and noon (in addition to bedtime dose)      !! DULoxetine (CYMBALTA)  20 MG capsule TAKE 1 CAPSULE BY MOUTH ONCE DAILY ALONG  WITH  A  60  MG  TABLET  FOR  A  TOTAL  DOSE  OF  80  MG  DAILY  Qty: 90 capsule, Refills: 1    Associated Diagnoses: Moderate episode of recurrent major depressive disorder (H)      !! DULoxetine (CYMBALTA) 60 MG capsule Take 1 capsule by mouth once daily  Qty: 90 capsule, Refills: 3    Associated Diagnoses: Moderate episode of recurrent major depressive disorder (H)      famotidine (PEPCID) 40 MG tablet TAKE 1 TABLET BY MOUTH AT BEDTIME  Qty: 90 tablet, Refills: 0    Associated Diagnoses: Gastroesophageal reflux disease without esophagitis      latanoprost (XALATAN) 0.005 % ophthalmic solution INSTILL 1 DROP INTO BOTH EYES EVERY DAY AS DIRECTED PT WILL NEED TO BE SEEN FOR FUTURE REFILLS      losartan (COZAAR) 25 MG tablet Take 1 in the AM (25 mg) and 2 in the PM (50 mg)  Qty: 270 tablet, Refills: 3    Comments: Please file this prescription and don't fill until patient calls for this prescription, thanks!  Associated Diagnoses: Hypertension goal BP (blood pressure) < 140/90      nitroFURantoin macrocrystal-monohydrate (MACROBID) 100 MG capsule Take 1 capsule (100 mg) by mouth daily  Qty: 90 capsule, Refills: 4    Associated Diagnoses: Acute cystitis without hematuria      oxyCODONE (ROXICODONE) 5 MG tablet Take 0.5 tablets (2.5 mg) by mouth every 6 hours as needed for moderate pain  Qty: 15 tablet, Refills: 0    Associated Diagnoses: Hx of fracture of femur      acetaminophen (TYLENOL) 500 MG tablet Take 1-2 tablets (500-1,000 mg) by mouth every 8 hours as needed for mild pain or fever (greater than 101 degrees)      ublituximab-xiiy (BRIUMVI) 150 MG/6ML injection Administer BRIUMVI 25mg IV Day 1 150mg, Day 15 450mg, 24 weeks from initial dose 450mg       !! - Potential duplicate medications found. Please discuss with provider.        STOP taking these medications       calcium carbonate 500 mg, elemental, (OSCAL 500) 1250 (500 Ca) MG TABS tablet Comments:    Reason for Stopping:         ciprofloxacin (CIPRO) 500 MG tablet Comments:   Reason for Stopping:         multivitamin w/minerals (THERA-VIT-M) tablet Comments:   Reason for Stopping:             Allergies   Allergies   Allergen Reactions    Bactrim [Sulfamethoxazole-Trimethoprim] Hallucination    Hydrochlorothiazide      Hyponatremia    Sulfamethizole     Amantadine      hallucinations    Budesonide     Budesonide-Formoterol Fumarate Rash

## 2024-04-09 NOTE — PLAN OF CARE
Goal Outcome Evaluation:    Summary: Hypoxia secondary to acute bronchitis  DATE & TIME: 4/8/24 1930-2300    Cognitive Concerns/ Orientation : Aox4, calm and cooperative  BEHAVIOR & AGGRESSION TOOL COLOR: green  ABNL VS/O2: Hypertensive, 148/66, on 1L nasal cannula. Was weaned to room air during day shift, sats 88-89 on room air, 94-95 on 1L  MOBILITY: Lift, turn and repo Q2, wheelchair bound at baseline, refuses repositioning at times  PAIN MANAGMENT: R hip and knee pain, X-ray completed today- negative. PRN Tylenol given x1-effective  DIET: Reg, good appetite  BOWEL/BLADDER: Incontinent of urine, purewick in place, No BM for 8 days- MD aware, on bowel regimen, at baseline only has 1 BM per week   ABNL LAB/BG: WBC 3.4, Hgb 9.8  DRAIN/DEVICES: R PIV SL with intermittent antibiotics  SKIN: Healed pressure injury on sacrum with scar tissue, scattered bruising  TESTS/PROCEDURES: X-ray of R knee and hip  D/C DATE: Pending, needs to be off O2, then will discharge home    OTHER IMPORTANT INFO: On continuous pulse ox, IV azithromycin and Rocephin Q24

## 2024-04-09 NOTE — PLAN OF CARE
Summary: Hypoxia secondary to acute bronchitis  DATE & TIME: 4/9/24 [id number]    Cognitive Concerns/ Orientation : Aox4, calm and cooperative  BEHAVIOR & AGGRESSION TOOL COLOR: green  ABNL VS/O2: Hypertensive, 138/79, 1L nasal cannula weaned to room air during day shift.  MOBILITY: Lift, turn and repo Q2, wheelchair bound at baseline, refuses repositioning at times  PAIN MANAGMENT: R hip and knee pain, PRN Tylenol given x1-effective  DIET: Reg, good appetite  BOWEL/BLADDER: Incontinent of urine, purewick in place, No BM for 9 days- MD aware, on bowel regimen, at baseline only has 1 BM per week   ABNL LAB/BG: WBC 3.1  DRAIN/DEVICES: R PIV SL with intermittent antibiotics  SKIN: Healed pressure injury on sacrum with scar tissue, scattered bruising  TESTS/PROCEDURES: None  D/C DATE: Possibly today, pending tailoring room air with oxygen sats greater that 90%, then will discharge home    OTHER IMPORTANT INFO: On continuous pulse ox, IV azithromycin and Rocephin Q24

## 2024-04-09 NOTE — PROGRESS NOTES
Summary: Hypoxia secondary to acute bronchitis. Hx of MS.  Orientation: AxO x4.  Vitals/Tele: VSS on 1L O2 ex HTN. Attempted to ween overnight without success.   IV Access/drains: R PIV SL. Intermittent antibiotics   Diet: Regular.  Mobility: Assist of 2 total care. T/R Q2. Wheelchair bound at baseline.   GI/: Incont of urine. Purewick in place. No BM since 3/31. MD aware. Patient usually has 1 BM per week at baseline.  Wound/Skin: Healed pressure injury to coccyx. Scattered bruising.  Pain: R hip and knee pain 4/10. Repositioning effective.   Discharge Plan: Pending back home with home care. Needs to ween down to room air.        See Flow sheets for assessment

## 2024-04-10 VITALS
HEART RATE: 74 BPM | SYSTOLIC BLOOD PRESSURE: 142 MMHG | HEIGHT: 68 IN | RESPIRATION RATE: 16 BRPM | OXYGEN SATURATION: 93 % | TEMPERATURE: 98 F | WEIGHT: 200.8 LBS | BODY MASS INDEX: 30.43 KG/M2 | DIASTOLIC BLOOD PRESSURE: 77 MMHG

## 2024-04-10 PROCEDURE — 250N000013 HC RX MED GY IP 250 OP 250 PS 637: Performed by: HOSPITALIST

## 2024-04-10 PROCEDURE — 250N000013 HC RX MED GY IP 250 OP 250 PS 637: Performed by: PHYSICIAN ASSISTANT

## 2024-04-10 PROCEDURE — 99239 HOSP IP/OBS DSCHRG MGMT >30: CPT | Performed by: HOSPITALIST

## 2024-04-10 RX ADMIN — GABAPENTIN 900 MG: 300 CAPSULE ORAL at 08:31

## 2024-04-10 RX ADMIN — LOSARTAN POTASSIUM 25 MG: 25 TABLET, FILM COATED ORAL at 08:32

## 2024-04-10 RX ADMIN — BACLOFEN 10 MG: 10 TABLET ORAL at 08:32

## 2024-04-10 RX ADMIN — LEVOTHYROXINE SODIUM 100 MCG: 100 TABLET ORAL at 06:45

## 2024-04-10 RX ADMIN — DULOXETINE HYDROCHLORIDE 80 MG: 30 CAPSULE, DELAYED RELEASE ORAL at 08:31

## 2024-04-10 RX ADMIN — ACETAMINOPHEN 650 MG: 325 TABLET, FILM COATED ORAL at 09:35

## 2024-04-10 RX ADMIN — GUAIFENESIN 1200 MG: 600 TABLET, EXTENDED RELEASE ORAL at 08:30

## 2024-04-10 RX ADMIN — ARMODAFINIL 200 MG: 50 TABLET ORAL at 08:30

## 2024-04-10 RX ADMIN — ACYCLOVIR 400 MG: 400 TABLET ORAL at 08:31

## 2024-04-10 RX ADMIN — BACLOFEN 10 MG: 10 TABLET ORAL at 00:23

## 2024-04-10 RX ADMIN — ACETAMINOPHEN 650 MG: 325 TABLET, FILM COATED ORAL at 00:23

## 2024-04-10 ASSESSMENT — ACTIVITIES OF DAILY LIVING (ADL)
DEPENDENT_IADLS:: CLEANING;LAUNDRY;TRANSPORTATION
ADLS_ACUITY_SCORE: 59
ADLS_ACUITY_SCORE: 59
ADLS_ACUITY_SCORE: 58
ADLS_ACUITY_SCORE: 59
ADLS_ACUITY_SCORE: 58
ADLS_ACUITY_SCORE: 59
ADLS_ACUITY_SCORE: 58

## 2024-04-10 NOTE — DISCHARGE SUMMARY
"[facility]  Hospitalist Discharge Summary      Date of Admission:  4/5/2024  Date of Discharge:  4/9/2024  Discharging Provider: Jose Decker MD  Discharge Service: Hospitalist Service    Discharge Diagnoses   Acute hypoxic respiratory failure  Acute bronchitis  E. Coli urinary tract infection   Pancytopenia   Closed sacral pressure injury present on admission   Type 2 non-ST-elevation myocardial infarction     History of   Multiple sclerosis  Neurogenic bladder  Urinary incontinence  Recurrent urinary tract infections  Chronic kidney disease stage 3  Hyperlipidemia   Gastroesophageal reflux disease     Postablative hypothyroidism  Lymphedema  HSV  Osteopenia  Major depressive disorder with anxiety  Central sleep apnea with day time drowsiness  Lung nodules  Left sided breast cancer      Clinically Significant Risk Factors     # Obesity: Estimated body mass index is 30.53 kg/m  as calculated from the following:    Height as of this encounter: 1.727 m (5' 8\").    Weight as of this encounter: 91.1 kg (200 lb 12.8 oz).       Follow-ups Needed After Discharge   Follow-up Appointments     Follow-up and recommended labs and tests       Follow up with primary care provider, Carolina Pulliam, within 7 days   for hospital follow- up.  The following labs/tests are recommended:   complete blood count and basic metabolic profile.            Unresulted Labs Ordered in the Past 30 Days of this Admission       No orders found from 3/6/2024 to 4/6/2024.            Discharge Disposition   Discharged to home  Condition at discharge: Stable    Hospital Course   Marylee Woods is a 68 year old female with medical history significant for Current E. Coli UTI with history of reoccurrence (on day 4 of Cipro), MS, Neurogenic bladder, Urinary incontinence, CKD stage 3, HTN, HLP, GERD, Postablative hypothyroidism, HSV, Osteopenia, MDD with anxiety, Central sleep apnea, Closed pressure injury of the sacrum, " Lymphedema, Known lung nodules, History of left sided breast cancer who was admitted on 4/5/2024 due to acute hypoxic respiratory failure secondary to acute bronchitis.  She had cough, fever, high BP, and hypoxia starting 3 days prior to presentation.   She has improved and will discharge home on 1 LPM oxygen and an oral antibiotic along with inhaled bronchodilators and inhaled steroid.     Acute hypoxic respiratory failure, resolved  Baseline patient does not require supplemental O2.  Currently still requiring 1-2 L/m per nasal cannula as of 4/6  *D Dimer 1.12  *CT PE was negative for PE but revealed inflammatory bronchial wall thickening with scattered endoluminal mucous plugs and mild atelectasis   She is on room air today- she received a Duoneb yesterday but became tachycardic and did not want to do anymore.  She also received inhaled budesonide but this was no covered by insurance so she did not want to continue with it.     Acute bronchitis  CT pulmonary embolism   1.  No pulmonary emboli.  2.  Inflammatory bronchial wall thickening with scattered endoluminal mucous plugs and mild atelectasis.  With acute hypoxic respiratory failure with reported fevers (Max of 103) and persistent productive cough she was started on IV abx.  -Discharge on cefuroxime  -Follow up with primary care provider      E. Coli UTI   History of recurrent UTI  Treated with ceftriaxone.  -Follow-up with primary care provider         R thigh pain  Hx of R distal femur fracture (nonoperative managed Nov 2023)  Hx of R hip surgery  Reports no new or recent trauma, but has been having R thigh pain, no new swelling endorsed.  - XR R femur, low suspicion of acute findings -   -Follow up with primary care provider      Pancytopenia  Recent Labs   Lab 04/09/24  1115 04/08/24  1700 04/07/24  0902 04/06/24  1029 04/05/24  1307   HGB 10.0* 9.8* 9.5* 8.9* 10.7*      Recent Labs   Lab 04/09/24  1115 04/08/24  1700 04/07/24  0902 04/06/24  1029  04/05/24  1307    142* 116* 115* 178   Likely in part with chronic disease, CKD, MS etc and somewhat in relation to infection and illness  Hgb 8.9 - mildly lower than baseline and likely somewhat dilutional. WBC 3.2. Platelets 115.   -follow up with primary care provider      Closed sacral pressure injury  WO Nurse consult requested.    Low air flow mattress ordered.       Troponin elevation, likely mild demand ischemia  Troponin elevated at 34 and on repeat remained at 34.  Patient denied chest pain.  EKG with normal sinus rhythm.  No further work-up at this time.       MS  Wheelchair bound at baseline.    -Continue PTA baclofen and gabapentin  -Resume BRIUMVI injection '    Neurogenic bladder  Urinary incontinence  Noted on chart review.       Chronic kidney disease stage 3  Baseline creatinine 0.5-0.9.       HTN  -continue PTA losartan 25 mg every morning and 50 mg every evening.     HLP  Noted on chart review.  Not currently on any medications.       GERD  - continue PTA Pepcid 40 mg at bedtime.         Postablative hypothyroidism  -Continue PTA synthroid 100 mcg (takes 6 times a week).       Lymphedema  -Utilizes compression stocking at home and working with home health     HSV  - continue PTA acyclovir 400 mg every 12 hours.       Osteopenia  -Resume  PTA supplements      Major depressive disorder with anxiety  -continue PTA Cymbalta 80 mg/d.     Central sleep apnea with day time drowsiness  -continue home BiLevel positive airway pressure     -continue PTA Armodafinil 200 mg every morning.       Known lung nodules  *Noted on chart review but not reported on Chest CT/PE study completed in the ED.  Recommend continued follow-up with PCP.       History of left sided breast cancer  *S/p lumpectomy and radiation therapy.  No interventions.    Consultations This Hospital Stay   WOUND OSTOMY CONTINENCE NURSE  IP CONSULT  NURSING TO CONSULT FOR VASCULAR ACCESS CARE IP CONSULT  CARE MANAGEMENT / SOCIAL WORK IP  CONSULT    Code Status   Full Code    Time Spent on this Encounter   IJose MD, personally saw the patient today and spent greater than 30 minutes discharging this patient.       Jose Decker MD  [place] [address] [facility]  [address] [address]  Phone: [phone]  ______________________________________________________________________    Physical Exam   Vital Signs: Temp: 98  F (36.7  C) Temp src: Oral BP: (!) 142/77 Pulse: 74   Resp: 16 SpO2: 93 % O2 Device: None (Room air)    Weight: 200 lbs 12.8 oz  Lungs- some rhonchi, no wheezing       Primary Care Physician   Carolina Pulliam    Discharge Orders      Reason for your hospital stay    bronchitis     Follow-up and recommended labs and tests     Follow up with primary care provider, Carolina Pulliam, within 7 days for hospital follow- up.  The following labs/tests are recommended: complete blood count and basic metabolic profile.     Activity    Your activity upon discharge: activity as tolerated     Resume Home Care Services    RN, physcial therapy and home health aide     Wound care and dressings    Instructions to care for your wound at home:Mepilex to sacrum     Miscellaneous DME Order    DME Documentation:   Describe the reason for need to support medical necessity: nebulized medications for bronchitis.     I, the undersigned, certify that the above prescribed supplies are medically necessary for this patient and is both reasonable and necessary in reference to accepted standards of medical and necessary in reference to accepted standards of medical practice in the treatment of this patient's condition and is not prescribed as a convenience.     Miscellaneous DME Order    DME Documentation:   Describe the reason for need to support medical necessity: home oxygen saturation monitoring.     I, the undersigned, certify that the above prescribed supplies are medically necessary for this  patient and is both reasonable and necessary in reference to accepted standards of medical and necessary in reference to accepted standards of medical practice in the treatment of this patient's condition and is not prescribed as a convenience.     Diet    Follow this diet upon discharge: Orders Placed This Encounter      Combination Diet Regular Diet Adult       Significant Results and Procedures   Most Recent 3 CBC's:  Recent Labs   Lab Test 04/09/24  1115 04/08/24  1700 04/07/24  0902   WBC 3.1* 3.4* 2.5*   HGB 10.0* 9.8* 9.5*   MCV 93 91 93    142* 116*     Most Recent 3 BMP's:  Recent Labs   Lab Test 04/07/24  0902 04/06/24  1029 04/05/24  1307    137 138   POTASSIUM 3.9 3.8 4.0   CHLORIDE 107 102 100   CO2 25 20* 26   BUN 12.2 14.1 14.6   CR 0.67 0.74 0.84   ANIONGAP 11 15 12   JORDON 8.7* 8.3* 9.1   GLC 92 75 96   ,   Results for orders placed or performed during the hospital encounter of 04/05/24   XR Chest 2 Views    Narrative    XR CHEST 2 VIEWS   4/5/2024 1:51 PM     HISTORY: Cough    COMPARISON: Chest radiograph 11/20/2023      Impression    IMPRESSION: No acute cardiopulmonary disease.    SHI SEWELL MD         SYSTEM ID:  [id number]   CT Chest Pulmonary Embolism w Contrast    Narrative    EXAM: CT CHEST PULMONARY EMBOLISM W CONTRAST  LOCATION: [facility]  DATE: 4/5/2024    INDICATION: SOB, dimer up  COMPARISON: None.  TECHNIQUE: CT chest pulmonary angiogram during arterial phase injection of IV contrast. Multiplanar reformats and MIP reconstructions were performed. Dose reduction techniques were used.   CONTRAST: 58 mL Isovue 370    FINDINGS:  ANGIOGRAM CHEST: Pulmonary arteries are normal caliber and negative for pulmonary emboli. Thoracic aorta is negative for dissection. No CT evidence of right heart strain.    LUNGS AND PLEURA: Moderate diffuse inflammatory bronchial wall thickening. Foci of endobronchial mucous plugs or secretions are seen scattered within  lower lobes. Slight groundglass opacity posteriorly should represent dependent atelectasis. Mild   scarring within lingula.    MEDIASTINUM/AXILLAE: Patulous esophagus. No adenopathy.    CORONARY ARTERY CALCIFICATION: Mild.    UPPER ABDOMEN: Cholecystectomy.    MUSCULOSKELETAL: Normal.      Impression    IMPRESSION:  1.  No pulmonary emboli.  2.  Inflammatory bronchial wall thickening with scattered endoluminal mucous plugs and mild atelectasis.   XR Femur Right 2 Views    Narrative    XR FEMUR RIGHT 2 VIEWS 4/8/2024 2:19 PM     HISTORY: pain, femur fracture in Nov, Hx R hip surgery also    COMPARISON: 11/17/2023         Impression    IMPRESSION: Progression of bony remodeling of a fracture of the distal  femur seen on the old study. There is still posttraumatic irregularity  and deformity at the fracture site with mild impaction but this is  unchanged. No significant knee joint effusion. Stable postoperative  changes of plate and screw fixation of an old healed fracture of the  right hip. This is in good alignment. Diffuse demineralization. No new  fracture. Chronic calcification within the subcutaneous fat  superficial to the greater trochanter, stable.    JOSEFA MATA MD         SYSTEM ID:  [id number]     *Note: Due to a large number of results and/or encounters for the requested time period, some results have not been displayed. A complete set of results can be found in Results Review.       Discharge Medications   Current Discharge Medication List        START taking these medications    Details   cefuroxime (CEFTIN) 500 MG tablet Take 1 tablet (500 mg) by mouth 2 times daily  Qty: 8 tablet, Refills: 0    Associated Diagnoses: Cough, unspecified type           CONTINUE these medications which have CHANGED    Details   gabapentin (NEURONTIN) 300 MG capsule Take 3 capsules (900 mg) by mouth 2 times daily    Associated Diagnoses: MS (multiple sclerosis) (H)      levothyroxine (SYNTHROID/LEVOTHROID) 100 MCG tablet  Take 1 tablet (100 mcg) by mouth daily SIX TIMES A WEEK (skip Sundays)    Associated Diagnoses: Postablative hypothyroidism      polyethylene glycol (MIRALAX) 17 GM/Dose powder Take 17 g by mouth daily as needed for constipation    Associated Diagnoses: Constipation, unspecified constipation type           CONTINUE these medications which have NOT CHANGED    Details   acyclovir (ZOVIRAX) 400 MG tablet Take 1 tablet (400 mg) by mouth every 12 hours  Qty: 180 tablet, Refills: 3    Associated Diagnoses: HSV (herpes simplex virus) infection      Armodafinil 200 MG TABS Take 200 mg by mouth every morning      !! baclofen (LIORESAL) 10 MG tablet Take 40 mg by mouth at bedtime In addition to morning and noon doses      !! baclofen (LIORESAL) 10 MG tablet Take 10 mg by mouth 2 times daily 0800 and noon (in addition to bedtime dose)      !! DULoxetine (CYMBALTA) 20 MG capsule TAKE 1 CAPSULE BY MOUTH ONCE DAILY ALONG  WITH  A  60  MG  TABLET  FOR  A  TOTAL  DOSE  OF  80  MG  DAILY  Qty: 90 capsule, Refills: 1    Associated Diagnoses: Moderate episode of recurrent major depressive disorder (H)      !! DULoxetine (CYMBALTA) 60 MG capsule Take 1 capsule by mouth once daily  Qty: 90 capsule, Refills: 3    Associated Diagnoses: Moderate episode of recurrent major depressive disorder (H)      famotidine (PEPCID) 40 MG tablet TAKE 1 TABLET BY MOUTH AT BEDTIME  Qty: 90 tablet, Refills: 0    Associated Diagnoses: Gastroesophageal reflux disease without esophagitis      latanoprost (XALATAN) 0.005 % ophthalmic solution INSTILL 1 DROP INTO BOTH EYES EVERY DAY AS DIRECTED PT WILL NEED TO BE SEEN FOR FUTURE REFILLS      losartan (COZAAR) 25 MG tablet Take 1 in the AM (25 mg) and 2 in the PM (50 mg)  Qty: 270 tablet, Refills: 3    Comments: Please file this prescription and don't fill until patient calls for this prescription, thanks!  Associated Diagnoses: Hypertension goal BP (blood pressure) < 140/90      nitroFURantoin  macrocrystal-monohydrate (MACROBID) 100 MG capsule Take 1 capsule (100 mg) by mouth daily  Qty: 90 capsule, Refills: 4    Associated Diagnoses: Acute cystitis without hematuria      oxyCODONE (ROXICODONE) 5 MG tablet Take 0.5 tablets (2.5 mg) by mouth every 6 hours as needed for moderate pain  Qty: 15 tablet, Refills: 0    Associated Diagnoses: Hx of fracture of femur      acetaminophen (TYLENOL) 500 MG tablet Take 1-2 tablets (500-1,000 mg) by mouth every 8 hours as needed for mild pain or fever (greater than 101 degrees)      ublituximab-xiiy (BRIUMVI) 150 MG/6ML injection Administer BRIUMVI 25mg IV Day 1 150mg, Day 15 450mg, 24 weeks from initial dose 450mg       !! - Potential duplicate medications found. Please discuss with provider.        STOP taking these medications       calcium carbonate 500 mg, elemental, (OSCAL 500) 1250 (500 Ca) MG TABS tablet Comments:   Reason for Stopping:         ciprofloxacin (CIPRO) 500 MG tablet Comments:   Reason for Stopping:         multivitamin w/minerals (THERA-VIT-M) tablet Comments:   Reason for Stopping:             Allergies   Allergies   Allergen Reactions    Bactrim [Sulfamethoxazole-Trimethoprim] Hallucination    Hydrochlorothiazide      Hyponatremia    Sulfamethizole     Amantadine      hallucinations    Budesonide     Budesonide-Formoterol Fumarate Rash

## 2024-04-10 NOTE — CARE PLAN
Discharge    Patient discharged to home via privet car with    Care plan note VSS on RA. No c/o of sob or chest pain. Spoke with pt about home Neb and Home O2. Pt declined neb and states that she doesn't feel she will need home O2. [facility] called to  home O2.     Listed belongings gathered and given to patient (including from security/pharmacy). Yes  Care Plan and Patient education resolved: Yes  Prescriptions if needed, hard copies sent with patient  NA  Medication Bin checked and emptied on discharge Yes  SW/care coordinator/charge RN aware of discharge: Yes

## 2024-04-10 NOTE — CONSULTS
Care Management Initial Consult    General Information  Assessment completed with: Patient,    Type of CM/SW Visit: Offer D/C Planning    Primary Care Provider verified and updated as needed: Yes   Readmission within the last 30 days: no previous admission in last 30 days      Reason for Consult: discharge planning  Advance Care Planning: Advance Care Planning Reviewed: no concerns identified     General Information Comments: High readmission risk    Communication Assessment  Patient's communication style: spoken language (English or Bilingual)    Hearing Difficulty or Deaf: no   Wear Glasses or Blind: yes    Cognitive  Cognitive/Neuro/Behavioral: WDL                      Living Environment:   People in home: child(ally), adult, spouse  Spouse Manuel and youngest daughter  Current living Arrangements: house      Able to return to prior arrangements: yes       Family/Social Support:  Care provided by: self, spouse/significant other, homecare agency  Provides care for: no one, unable/limited ability to care for self  Marital Status:             Description of Support System: Involved    Support Assessment: Adequate family and caregiver support    Current Resources:   Patient receiving home care services: Yes  Skilled Home Care Services: Home Health Aid, Skilled Nursing, Physical Therapy  Community Resources: None  Equipment currently used at home: grab bar, toilet, grab bar, tub/shower, hospital bed, shower chair, transfer board, walker, standard, wheelchair, manual, wheelchair, power  Supplies currently used at home:      Employment/Financial:  Employment Status: retired     Employment/ Comments: Patient was a   Financial Concerns: none      Finance Comments: Patient planning to hire private PCA in the future    Does the patient's insurance plan have a 3 day qualifying hospital stay waiver?  Yes     Which insurance plan 3 day waiver is available? [employer] REACH    Will the waiver be used  for post-acute placement? No    Lifestyle & Psychosocial Needs:  Social Determinants of Health     Food Insecurity: Low Risk  (4/2/2024)    Food Insecurity     Within the past 12 months, did you worry that your food would run out before you got money to buy more?: No     Within the past 12 months, did the food you bought just not last and you didn t have money to get more?: No   Depression: Not at risk (4/3/2024)    PHQ-2     PHQ-2 Score: 2   Housing Stability: Low Risk  (4/2/2024)    Housing Stability     Do you have housing? : Yes     Are you worried about losing your housing?: No   Tobacco Use: High Risk (4/5/2024)    Patient History     Smoking Tobacco Use: Some Days     Smokeless Tobacco Use: Never     Passive Exposure: Current   Financial Resource Strain: Low Risk  (4/2/2024)    Financial Resource Strain     Within the past 12 months, have you or your family members you live with been unable to get utilities (heat, electricity) when it was really needed?: No   Alcohol Use: Not on file   Transportation Needs: Low Risk  (4/2/2024)    Transportation Needs     Within the past 12 months, has lack of transportation kept you from medical appointments, getting your medicines, non-medical meetings or appointments, work, or from getting things that you need?: No   Physical Activity: Inactive (4/2/2024)    Exercise Vital Sign     Days of Exercise per Week: 0 days     Minutes of Exercise per Session: 20 min   Interpersonal Safety: Not on file   Stress: No Stress Concern Present (4/2/2024)    [employer] [place] [employer] - Occupational Stress Questionnaire     Feeling of Stress : Only a little   Social Connections: Unknown (4/2/2024)    Social Connection and Isolation Panel [NHANES]     Frequency of Communication with Friends and Family: Not on file     Frequency of Social Gatherings with Friends and Family: Once a week     Attends Jew Services: Not on file     Active Member of Clubs or Organizations: Not  on file     Attends Club or Organization Meetings: Not on file     Marital Status: Not on file   Health Literacy: Not on file       Functional Status:  Prior to admission patient needed assistance:   Dependent ADLs:: Bathing, Dressing, Positioning, Transfers, Wheelchair-with assist, Wheelchair-independent, Toileting  Dependent IADLs:: Cleaning, Laundry, Transportation       Mental Health Status:  Mental Health Status: No Current Concerns       Chemical Dependency Status:  Chemical Dependency Status: No Current Concerns             Values/Beliefs:  Spiritual, Cultural Beliefs, Amish Practices, Values that affect care:            Values/Beliefs Comment: Spouse is a Hospice Chaplain with [employer]    Additional Information:  Care Coordinator met with patient briefly yesterday and again this AM per automatic CM consult for high readmission risk and discharge planning.  Role of care management was explained.  Patient's discharge was cancelled late in the day yesterday due to tachy rhythm after receiving Albuterol.    Both a nebulizer and home oxygen were originally ordered and delivered to her bedside.  Patient no longer is needing either nebs or oxygen, so they will need to be returned to  Home Medical.  RN Charge and Nursing were updated.    Patient has MS and does not walk.  She has home care through [facility] for SN,PT and HHA.  Patient noted the home PT is working on pivot transfer with patient wearing her brace and CAM boot on her right foot.  Patient otherwise uses a transfer board.  Patient's spouse is her caregiver.  He is a Hospice Chaplain, so does work out of the home.  Patient spends much of her day in her power chair that reclines to allow her to relieve pressure in areas.  Patient is planning to hire a private duty PCA in the future.  Patient's youngest daughter is temporarily living with her parents until she moves to [place].    Patient had a mammogram scheduled at the [facility]  for this AM.  This has been rescheduled on 4/16/24.  Patient will see her Neurologist, Dr Redman at the [facility], tomorrow and has a Sleep Medicine appointment with Dr Deal scheduled on Friday of this week.  Patient prefers to schedule her own PCP follow up appointment at [facility], as she may want to have this as a virtual appointment.  [facility] is aware patient is discharging home today and she has orders to resume her home care.  CC has been in communication with their  [place].   Patient has stated more than once how great her home care was.    Patient reports her spouse will arrive around 1:00 PM to provide transportation home.  She manages her own medications and felt Nsg can go over the discharge orders with her at their convenience, they do not need to sammi until her spouse arrives.      Destiney Torrez, RN  Inpatient Care Management  [phone]

## 2024-04-10 NOTE — PROGRESS NOTES
[facility]    Medicine Progress Note - Hospitalist Service    Date of Admission:  4/5/2024    Assessment & Plan   Marylee Woods is a 68 year old female with medical history significant for Current E. Coli UTI with history of reoccurrence (on day 4 of Cipro), MS, Neurogenic bladder, Urinary incontinence, CKD stage 3, HTN, HLP, GERD, Postablative hypothyroidism, HSV, Osteopenia, MDD with anxiety, Central sleep apnea, Closed pressure injury of the sacrum, Lymphedema, Known lung nodules, History of left sided breast cancer who was admitted on 4/5/2024 due to acute hypoxic respiratory failure secondary to acute bronchitis.  She had cough, fever, high BP, and hypoxia starting 3 days prior to presentation.   She has improved and will discharge home on 1 LPM oxygen and an oral antibiotic along with inhaled bronchodilators and inhaled steroid.     Acute hypoxic respiratory failure, resolved  Baseline patient does not require supplemental O2.  Currently still requiring 1-2 L/m per nasal cannula as of 4/6  *D Dimer 1.12  *CT PE was negative for PE but revealed inflammatory bronchial wall thickening with scattered endoluminal mucous plugs and mild atelectasis   She is on room air today- she received a Duoneb yesterday but became tachycardic and did not want to do anymore.  She also received inhaled budesonide but this was no covered by insurance so she did not want to continue with it.     Acute bronchitis  CT pulmonary embolism   1.  No pulmonary emboli.  2.  Inflammatory bronchial wall thickening with scattered endoluminal mucous plugs and mild atelectasis.  With acute hypoxic respiratory failure with reported fevers (Max of 103) and persistent productive cough she was started on IV abx.  -Discharge on cefuroxime  -Follow up with primary care provider      E. Coli UTI   History of recurrent UTI  Treated with ceftriaxone.  -Follow-up with primary care provider         R thigh pain  Hx of R distal  femur fracture (nonoperative managed Nov 2023)  Hx of R hip surgery  Reports no new or recent trauma, but has been having R thigh pain, no new swelling endorsed.  - XR R femur, low suspicion of acute findings -   -Follow up with primary care provider      Pancytopenia          Recent Labs   Lab 04/09/24  1115 04/08/24  1700 04/07/24  0902 04/06/24  1029 04/05/24  1307   HGB 10.0* 9.8* 9.5* 8.9* 10.7*              Recent Labs   Lab 04/09/24  1115 04/08/24  1700 04/07/24  0902 04/06/24  1029 04/05/24  1307    142* 116* 115* 178   Likely in part with chronic disease, CKD, MS etc and somewhat in relation to infection and illness  Hgb 8.9 - mildly lower than baseline and likely somewhat dilutional. WBC 3.2. Platelets 115.   -follow up with primary care provider      Closed sacral pressure injury  WOC Nurse consult requested.    Low air flow mattress ordered.       Troponin elevation, likely mild demand ischemia  Troponin elevated at 34 and on repeat remained at 34.  Patient denied chest pain.  EKG with normal sinus rhythm.  No further work-up at this time.       MS  Wheelchair bound at baseline.    -Continue PTA baclofen and gabapentin  -Resume BRIUMVI injection '     Neurogenic bladder  Urinary incontinence  Noted on chart review.       Chronic kidney disease stage 3  Baseline creatinine 0.5-0.9.       HTN  -continue PTA losartan 25 mg every morning and 50 mg every evening.      HLP  Noted on chart review.  Not currently on any medications.       GERD  - continue PTA Pepcid 40 mg at bedtime.         Postablative hypothyroidism  -Continue PTA synthroid 100 mcg (takes 6 times a week).       Lymphedema  -Utilizes compression stocking at home and working with home health     HSV  - continue PTA acyclovir 400 mg every 12 hours.       Osteopenia  -Resume  PTA supplements      Major depressive disorder with anxiety  -continue PTA Cymbalta 80 mg/d.     Central sleep apnea with day time drowsiness  -continue home BiLevel  "positive airway pressure     -continue PTA Armodafinil 200 mg every morning.       Known lung nodules  *Noted on chart review but not reported on Chest CT/PE study completed in the ED.  Recommend continued follow-up with PCP.       History of left sided breast cancer  *S/p lumpectomy and radiation therapy.  No interventions.          Diet: Combination Diet Regular Diet Adult  Diet    DVT Prophylaxis: Pneumatic Compression Devices  Verduzco Catheter: Not present  Lines: None     Cardiac Monitoring: None  Code Status: Full Code      Clinically Significant Risk Factors                  # Hypertension: Noted on problem list        # Obesity: Estimated body mass index is 30.53 kg/m  as calculated from the following:    Height as of this encounter: 1.727 m (5' 8\").    Weight as of this encounter: 91.1 kg (200 lb 12.8 oz).      # Financial/Environmental Concerns: none         Disposition Plan     Medically Ready for Discharge: Anticipated Tomorrow       Jose Decker MD  Hospitalist Service  [facility]  Securely message with [employer] (more info)  Text page via [employer] Paging/Directory   ______________________________________________________________________    Interval History   Still has a cough. Still on 1LPM nasal canula.     Physical Exam   Vital Signs: Temp: 98  F (36.7  C) Temp src: Oral BP: (!) 142/77 Pulse: 74   Resp: 16 SpO2: 93 % O2 Device: None (Room air)    Weight: 200 lbs 12.8 oz  Awake and alert   Some rhonchi but no wheezing    Medical Decision Making       MANAGEMENT DISCUSSED with the following over the past 24 hours: patient   NOTE(S)/MEDICAL RECORDS REVIEWED over the past 24 hours: EPIC       Data     I have personally reviewed the following data over the past 24 hrs:    N/A  \   N/A   / N/A     N/A N/A N/A /  N/A   N/A N/A N/A \       Imaging results reviewed over the past 24 hrs:   No results found for this or any previous visit (from the past 24 hour(s)).  "

## 2024-04-10 NOTE — PLAN OF CARE
Goal Outcome Evaluation:    Summary: Hypoxia secondary to acute bronchitis  DATE & TIME: 4/9/24 1930-2300   Cognitive Concerns/ Orientation : Aox4, calm and cooperative  BEHAVIOR & AGGRESSION TOOL COLOR: green  ABNL VS/O2: Hypertensive (142/71), on room air, weaned off 1L this afternoon  MOBILITY: Lift, turn and repo Q2, wheelchair bound at baseline  PAIN MANAGMENT: PRN Tylenol given for right knee pain  DIET: Reg, good appetite  BOWEL/BLADDER: Incontinent of urine, purewick in place, No BM for 9 days- MD aware, on bowel regimen, at baseline only has 1 BM per week   ABNL LAB/BG: WBC 3.1, Hgb 10  DRAIN/DEVICES: R PIV SL with intermittent antibiotics  SKIN: Healed pressure injury on sacrum with scar tissue, mepilex in place, scattered bruising  TESTS/PROCEDURES: None  D/C DATE: Discharging home tomorrow w/ home O2 and nebs,  OTHER IMPORTANT INFO: On continuous pulse ox, IV azithromycin and Rocephin Q24

## 2024-04-10 NOTE — PLAN OF CARE
Goal Outcome Evaluation:    SUMMARY: hypoxia r/t bronchitis    DATE & TIME: 4/9/24-4/10/24; 1828-5560      Cognitive Concerns/ Orientation : A/Ox4     BEHAVIOR & AGGRESSION TOOL COLOR: green    CIWA SCORE: NA     ABNL VS/O2: VSS on RA    MOBILITY: A2 Lift, T/R q2hr    PAIN MANAGMENT: pain 3/10 requested prn tylenol and baclofen    DIET: Regular diet    BOWEL/BLADDER: incont. BB, purewick in place, no BM this shift    ABNL LAB/BG: WBC 3.1    DRAIN/DEVICES: R PIV SL    TELEMETRY RHYTHM: NA    SKIN: scattered bruising, healed abrasian on coccyx covered with mepilex    TESTS/PROCEDURES: NA    D/C DAY/GOALS/PLACE: discharge today pending medication list    OTHER IMPORTANT INFO: continuous pulse ox

## 2024-04-11 ENCOUNTER — TELEPHONE (OUTPATIENT)
Dept: FAMILY MEDICINE | Facility: CLINIC | Age: 69
End: 2024-04-11
Payer: MEDICARE

## 2024-04-11 ENCOUNTER — TRANSFERRED RECORDS (OUTPATIENT)
Dept: HEALTH INFORMATION MANAGEMENT | Facility: CLINIC | Age: 69
End: 2024-04-11
Payer: MEDICARE

## 2024-04-11 NOTE — TELEPHONE ENCOUNTER
Writer called patient/spouse (CTC) in regards to appointment for hospital f/up.     Scheduled appointment as agreed upon.     SAEID DoradoN RN  [facility]

## 2024-04-12 ENCOUNTER — TRANSFERRED RECORDS (OUTPATIENT)
Dept: HEALTH INFORMATION MANAGEMENT | Facility: CLINIC | Age: 69
End: 2024-04-12
Payer: MEDICARE

## 2024-04-12 ENCOUNTER — TELEPHONE (OUTPATIENT)
Dept: FAMILY MEDICINE | Facility: CLINIC | Age: 69
End: 2024-04-12
Payer: MEDICARE

## 2024-04-12 ENCOUNTER — PATIENT OUTREACH (OUTPATIENT)
Dept: CARE COORDINATION | Facility: CLINIC | Age: 69
End: 2024-04-12
Payer: MEDICARE

## 2024-04-12 NOTE — PROGRESS NOTES
Clinic Care Coordination Contact  [facility]/Voicemail    Clinical Data: Care Coordinator Outreach    Outreach Documentation Number of Outreach Attempt   4/12/2024   8:52 AM 2       Left message on patient's voicemail with call back information and requested return call.  Care Coordinator will do no further outreaches at this time.    Cristina Quezada  [phone]  Care

## 2024-04-12 NOTE — TELEPHONE ENCOUNTER
Maria Ines RN with [facility] calling for verbal orders and patient update.     Requesting orders from: Carolina Pulliam  Provider is following patient: Yes  Is this a 60-day recertification request?  No    Orders Requested    Skilled Nursing  Request for resumption in care.   2x/wk for 5 wks, then 1x/wk for 1 wk, with 2 PRN visits    HHA (Home Health Aide)  Request for resumption in care.   1x/wk for bathing assistance    Physical Therapy  Request for initial evaluation and treatment (one time)  **Verbal order provided per RN protocol**    Patient update for PCP:  Stage III pressure ulcer to L buttock is now healed, however, patient has a new area of tenderness to L buttock distal to where healed pressure ulcer was. Home care RN reports no erythema or borders, just tenderness when palpated. Would like this assessed during upcoming hospital follow up visit with Dr. Pulliam on 4/16.    Verbal orders given for PT eval/treat.    Information was gathered for skilled nursing & HHA resumption.  Provider review needed.      RN will contact Home Care with information after provider review.  Confirmed ok to leave a detailed message with call back.  Contact information confirmed and updated as needed.    Jes Gupta RN BSN  [facility]

## 2024-04-15 ENCOUNTER — TELEPHONE (OUTPATIENT)
Dept: ENDOCRINOLOGY | Facility: CLINIC | Age: 69
End: 2024-04-15
Payer: MEDICARE

## 2024-04-15 NOTE — TELEPHONE ENCOUNTER
Maria Ines RN calling calling from [facility] and orders approved by DR Pulliam,  No further action needed.    Luz Mathews RN  [facility] for requested home care orders.  Dr. Carolina Pulliam MD / [facility]

## 2024-04-16 ENCOUNTER — ANCILLARY PROCEDURE (OUTPATIENT)
Dept: MAMMOGRAPHY | Facility: CLINIC | Age: 69
End: 2024-04-16
Attending: FAMILY MEDICINE
Payer: MEDICARE

## 2024-04-16 DIAGNOSIS — N64.4 BREAST PAIN: ICD-10-CM

## 2024-04-16 PROCEDURE — G0279 TOMOSYNTHESIS, MAMMO: HCPCS | Mod: LT | Performed by: RADIOLOGY

## 2024-04-16 PROCEDURE — 76642 ULTRASOUND BREAST LIMITED: CPT | Mod: LT | Performed by: RADIOLOGY

## 2024-04-16 PROCEDURE — 77065 DX MAMMO INCL CAD UNI: CPT | Mod: LT | Performed by: RADIOLOGY

## 2024-04-17 ENCOUNTER — TELEPHONE (OUTPATIENT)
Dept: FAMILY MEDICINE | Facility: CLINIC | Age: 69
End: 2024-04-17

## 2024-04-17 ENCOUNTER — OFFICE VISIT (OUTPATIENT)
Dept: FAMILY MEDICINE | Facility: CLINIC | Age: 69
End: 2024-04-17
Payer: MEDICARE

## 2024-04-17 VITALS
HEART RATE: 76 BPM | DIASTOLIC BLOOD PRESSURE: 90 MMHG | RESPIRATION RATE: 18 BRPM | TEMPERATURE: 97.2 F | OXYGEN SATURATION: 98 % | SYSTOLIC BLOOD PRESSURE: 169 MMHG

## 2024-04-17 DIAGNOSIS — L85.3 DRY SKIN: ICD-10-CM

## 2024-04-17 DIAGNOSIS — N10 PYELONEPHRITIS, ACUTE: ICD-10-CM

## 2024-04-17 DIAGNOSIS — G35 MS (MULTIPLE SCLEROSIS) (H): ICD-10-CM

## 2024-04-17 DIAGNOSIS — R31.9 URINARY TRACT INFECTION WITH HEMATURIA, SITE UNSPECIFIED: ICD-10-CM

## 2024-04-17 DIAGNOSIS — N39.0 URINARY TRACT INFECTION WITH HEMATURIA, SITE UNSPECIFIED: ICD-10-CM

## 2024-04-17 DIAGNOSIS — Z09 HOSPITAL DISCHARGE FOLLOW-UP: Primary | ICD-10-CM

## 2024-04-17 DIAGNOSIS — I10 HYPERTENSION GOAL BP (BLOOD PRESSURE) < 140/90: ICD-10-CM

## 2024-04-17 PROBLEM — R05.9 COUGH, UNSPECIFIED TYPE: Status: RESOLVED | Noted: 2024-04-05 | Resolved: 2024-04-17

## 2024-04-17 LAB
ALBUMIN UR-MCNC: NEGATIVE MG/DL
APPEARANCE UR: ABNORMAL
BACTERIA #/AREA URNS HPF: ABNORMAL /HPF
BASOPHILS # BLD AUTO: 0 10E3/UL (ref 0–0.2)
BASOPHILS NFR BLD AUTO: 1 %
BILIRUB UR QL STRIP: NEGATIVE
COLOR UR AUTO: YELLOW
EOSINOPHIL # BLD AUTO: 0.2 10E3/UL (ref 0–0.7)
EOSINOPHIL NFR BLD AUTO: 3 %
ERYTHROCYTE [DISTWIDTH] IN BLOOD BY AUTOMATED COUNT: 12.7 % (ref 10–15)
GLUCOSE UR STRIP-MCNC: NEGATIVE MG/DL
HCT VFR BLD AUTO: 30.6 % (ref 35–47)
HGB BLD-MCNC: 9.8 G/DL (ref 11.7–15.7)
HGB UR QL STRIP: ABNORMAL
IMM GRANULOCYTES # BLD: 0 10E3/UL
IMM GRANULOCYTES NFR BLD: 0 %
KETONES UR STRIP-MCNC: NEGATIVE MG/DL
LEUKOCYTE ESTERASE UR QL STRIP: ABNORMAL
LYMPHOCYTES # BLD AUTO: 1.4 10E3/UL (ref 0.8–5.3)
LYMPHOCYTES NFR BLD AUTO: 26 %
MCH RBC QN AUTO: 29.6 PG (ref 26.5–33)
MCHC RBC AUTO-ENTMCNC: 32 G/DL (ref 31.5–36.5)
MCV RBC AUTO: 92 FL (ref 78–100)
MONOCYTES # BLD AUTO: 0.4 10E3/UL (ref 0–1.3)
MONOCYTES NFR BLD AUTO: 7 %
NEUTROPHILS # BLD AUTO: 3.3 10E3/UL (ref 1.6–8.3)
NEUTROPHILS NFR BLD AUTO: 63 %
NITRATE UR QL: NEGATIVE
PH UR STRIP: 5.5 [PH] (ref 5–7)
PLATELET # BLD AUTO: 260 10E3/UL (ref 150–450)
RBC # BLD AUTO: 3.31 10E6/UL (ref 3.8–5.2)
RBC #/AREA URNS AUTO: ABNORMAL /HPF
SP GR UR STRIP: 1.02 (ref 1–1.03)
SQUAMOUS #/AREA URNS AUTO: ABNORMAL /LPF
UROBILINOGEN UR STRIP-ACNC: 0.2 E.U./DL
WBC # BLD AUTO: 5.3 10E3/UL (ref 4–11)
WBC #/AREA URNS AUTO: ABNORMAL /HPF

## 2024-04-17 PROCEDURE — 87186 SC STD MICRODIL/AGAR DIL: CPT | Performed by: FAMILY MEDICINE

## 2024-04-17 PROCEDURE — 85025 COMPLETE CBC W/AUTO DIFF WBC: CPT | Performed by: FAMILY MEDICINE

## 2024-04-17 PROCEDURE — G2211 COMPLEX E/M VISIT ADD ON: HCPCS | Performed by: FAMILY MEDICINE

## 2024-04-17 PROCEDURE — 99213 OFFICE O/P EST LOW 20 MIN: CPT | Performed by: FAMILY MEDICINE

## 2024-04-17 PROCEDURE — 80048 BASIC METABOLIC PNL TOTAL CA: CPT | Performed by: FAMILY MEDICINE

## 2024-04-17 PROCEDURE — 87088 URINE BACTERIA CULTURE: CPT | Performed by: FAMILY MEDICINE

## 2024-04-17 PROCEDURE — 36415 COLL VENOUS BLD VENIPUNCTURE: CPT | Performed by: FAMILY MEDICINE

## 2024-04-17 PROCEDURE — 87086 URINE CULTURE/COLONY COUNT: CPT | Performed by: FAMILY MEDICINE

## 2024-04-17 PROCEDURE — 81001 URINALYSIS AUTO W/SCOPE: CPT | Performed by: FAMILY MEDICINE

## 2024-04-17 RX ORDER — LOSARTAN POTASSIUM 50 MG/1
50 TABLET ORAL 2 TIMES DAILY
Qty: 180 TABLET | Refills: 4 | Status: SHIPPED | OUTPATIENT
Start: 2024-04-17 | End: 2024-04-18

## 2024-04-17 ASSESSMENT — PAIN SCALES - GENERAL: PAINLEVEL: MILD PAIN (3)

## 2024-04-17 NOTE — TELEPHONE ENCOUNTER
Okay for requested home care orders.    For blood pressure: Lets increase to 50 mg of losartan morning and night.  I will send a new prescription.        Dr. Carolina Pulliam MD / [facility]

## 2024-04-17 NOTE — TELEPHONE ENCOUNTER
Dr. Pulliam-Please review and advise:  Do you agree with home care orders?  Any change to BP medication recommended?    Call received fro Ambar PT, with [facility]:  [facility] received Home Care referral upon patient's hospital discharge  PT order request:  Starting 4/20/24: 2 visits per week for 3 weeks, 1 visit per week for 1 week  Blood pressure was 182/84; recheck was 180/82; manual and then automatic  Focused on ROM and strength; did not do too much physical activity with today's home care visit    Informed Ambar patient also saw Dr. Pulliam, PCP, today and blood pressure was elevated but not as high as reported from home care PT visit.  Will send message to Dr. Pulliam and Ambar will be updated on clinician's response.    Ambar verbalized understanding and in agreement with plan.    Thank you!  SAEID LowN, RN-BC  [facility]

## 2024-04-17 NOTE — PROGRESS NOTES
Assessment & Plan     Hospital discharge follow-up  Doing much better  No further shortness of breath  Check follow-up labs CBC and BMP per discharge summary    Dry skin  Pressure sore healing well  Does have a scabby area over ischial tuberosity without any skin breakage  Will work to heal with hydra cortisone and Aquaphor  Continues to have home nurse evaluate regularly and if anything worsens we will avoid pressure on that area      MED REC REQUIRED  Post Medication Reconciliation Status: discharge medications reconciled, continue medications without change    DME (Durable Medical Equipment) Orders and Documentation  Orders Placed This Encounter   Procedures    Ankle/Foot Bracing Supplies Order Ankle Brace; Bilateral      The patient was assessed and it was determined the patient is in need of the following listed DME Supplies/Equipment. Please complete supporting documentation below to demonstrate medical necessity.      Ankle/Foot Bracing Supplies Documentation  Patient requires the use of the ordered bracing device due to following medical need/condition: Multiple Sclerosis                                 Subjective Marylee is a 68 year old, presenting for the following health issues:  Hospital F/U        4/17/2024     1:02 PM   Additional Questions   Roomed by Tatyana HARDING   Accompanied by Manuel     Hospitals in [place]       Hospital Follow-up Visit:    Hospital/Nursing Home/IP Rehab Facility: [facility]  Date of Admission: 04/05/2024  Date of Discharge: 04/10/2024  Reason(s) for Admission: Hypoxemia   Was the patient in the ICU or did the patient experience delirium during hospitalization?  No  Do you have any other stressors you would like to discuss with your provider? Relationship Concerns and Grief or Loss    Problems taking medications regularly:  None  Medication changes since discharge: Finished anitbiotic  Problems adhering to non-medication therapy:  None    Summary of  hospitalization:  [facility] discharge summary reviewed  Diagnostic Tests/Treatments reviewed.  Follow up needed: none  Other Healthcare Providers Involved in Patient s Care:         specialists, home care  Update since discharge: improved.         Plan of care communicated with patient and family               Objective    BP (!) 169/90 (BP Location: Right arm, Patient Position: Sitting, Cuff Size: Adult Regular)   Pulse 76   Temp 97.2  F (36.2  C) (Temporal)   Resp 18   LMP 01/31/2004 (Approximate)   SpO2 98%   There is no height or weight on file to calculate BMI.  Physical Exam   GENERAL: alert and no distress  SKIN: no suspicious lesions or rashes.  Well healing pressure sore.  Has scaling over left tuberosity without any skin breakage.              The longitudinal plan of care for the diagnosis(es)/condition(s) as documented were addressed during this visit. Due to the added complexity in care, I will continue to support Marylee in the subsequent management and with ongoing continuity of care.      Signed Electronically by: Carolina Pulliam MD

## 2024-04-17 NOTE — PATIENT INSTRUCTIONS
We'll do labs today  For your scaly sore spot on your bottom:  Aquaphor twice a day mixed with hydrocortisone.

## 2024-04-18 RX ORDER — LOSARTAN POTASSIUM 50 MG/1
50 TABLET ORAL 2 TIMES DAILY
Qty: 180 TABLET | Refills: 4 | Status: SHIPPED | OUTPATIENT
Start: 2024-04-18

## 2024-04-18 NOTE — TELEPHONE ENCOUNTER
I called Ambar PT and approved of verbal orders as well as informed about increased losartan dosage to 50 mg BID.    Called patient as well to inform of increased dosage.  Currently she has 25 mg tablets so she will begin taking 2 tablets in AM and 2 in PM (was previously taking 25mg in AM and 50 mg in PM).  Informed her that once she gets the new script, it will be 50 mg tabs so just needs to take one tablet twice daily- she provided understanding.       Current script that PCP sent in 4/17 has both sets of instructions (new and previous)    (SCRIPT FROM 4/17)   Disp Refills Start End AVELINO   losartan (COZAAR) 50 MG tablet 180 tablet 4 4/17/2024 -- No   Sig - Route: Take 1 tablet (50 mg) by mouth 2 times daily Take 1 in the AM (25 mg) and 2 in the PM (50 mg) - Oral      -- clear documentation below from Dr. Carolina Pulliam to take 50 mg BID. Sent in new script without the old instructions.    SCRIPT SENT TODAY 4/18- fulfilling signed order    losartan (COZAAR) 50 MG tablet Take 1 tablet (50 mg) by mouth 2 times daily 180 tablet 4 ordered 04/18/2024         SAEID HolleyN RN  [facility]

## 2024-04-19 ENCOUNTER — TELEPHONE (OUTPATIENT)
Dept: FAMILY MEDICINE | Facility: CLINIC | Age: 69
End: 2024-04-19
Payer: MEDICARE

## 2024-04-19 DIAGNOSIS — Z53.9 DIAGNOSIS NOT YET DEFINED: Primary | ICD-10-CM

## 2024-04-19 LAB
ANION GAP SERPL CALCULATED.3IONS-SCNC: 12 MMOL/L (ref 7–15)
BUN SERPL-MCNC: 23 MG/DL (ref 8–23)
CALCIUM SERPL-MCNC: 10.5 MG/DL (ref 8.8–10.2)
CHLORIDE SERPL-SCNC: 105 MMOL/L (ref 98–107)
CREAT SERPL-MCNC: 0.68 MG/DL (ref 0.51–0.95)
DEPRECATED HCO3 PLAS-SCNC: 23 MMOL/L (ref 22–29)
EGFRCR SERPLBLD CKD-EPI 2021: >90 ML/MIN/1.73M2
GLUCOSE SERPL-MCNC: 83 MG/DL (ref 70–99)
POTASSIUM SERPL-SCNC: 4.7 MMOL/L (ref 3.4–5.3)
SODIUM SERPL-SCNC: 140 MMOL/L (ref 135–145)

## 2024-04-19 PROCEDURE — G0179 MD RECERTIFICATION HHA PT: HCPCS | Performed by: FAMILY MEDICINE

## 2024-04-19 PROCEDURE — 99207 PR MD RECERTIFICATION HHA PT: CPT | Performed by: FAMILY MEDICINE

## 2024-04-19 RX ORDER — CEPHALEXIN 500 MG/1
500 CAPSULE ORAL 2 TIMES DAILY
Qty: 14 CAPSULE | Refills: 0 | Status: SHIPPED | OUTPATIENT
Start: 2024-04-19 | End: 2024-04-23

## 2024-04-19 NOTE — TELEPHONE ENCOUNTER
Forms/Letter Request    Type of form/letter: Home Health Certification      Do we have the form/letter: Yes: placed in providers folder     Who is the form from? Home care    Where did/will the form come from? form was faxed in    When is form/letter needed by: ASAP     How would you like the form/letter returned: Fax : [phone]

## 2024-04-22 ENCOUNTER — TELEPHONE (OUTPATIENT)
Dept: FAMILY MEDICINE | Facility: CLINIC | Age: 69
End: 2024-04-22
Payer: MEDICARE

## 2024-04-22 DIAGNOSIS — N39.0 URINARY TRACT INFECTION WITH HEMATURIA, SITE UNSPECIFIED: Primary | ICD-10-CM

## 2024-04-22 DIAGNOSIS — R31.9 URINARY TRACT INFECTION WITH HEMATURIA, SITE UNSPECIFIED: Primary | ICD-10-CM

## 2024-04-22 LAB
BACTERIA UR CULT: ABNORMAL
BACTERIA UR CULT: ABNORMAL

## 2024-04-22 NOTE — TELEPHONE ENCOUNTER
See most recent TE created today at 2:40pm (below):      Jeyson Quigley, RN   to Carolina Pulliam MD   TM      4/22/24  2:43 PM  FYI of patient update UTI symptoms.    Thank you    Jeyson Espinosa, RN     TM    4/22/24  2:43 PM  Note  ----- Message from Carolina Pulliam MD sent at 4/22/2024 11:46 AM CDT -----  Can you call Marylee and find out if she is feeling better from a UTI perspective?  If not I am going to change her medication slightly to Macrobid twice daily for 7 days.         Patient returned call from message left from earlier encounter.  Patient reported wears depend due to incontinency, can't really tell if having urgency to urinate, but changes her incontinent pads 2-3 times daily depending on how much water drank. Reported residual burning pain occasionally with urination into pad but has improved.  Started the Keflex since Saturday.  Denied side affects from medication thus far other than fatigue.  Clinic RN advised to call back to clinic if symptoms failed to improve or new developing symptoms arise. Patient verbalized understood.     Chart reviewed, it appeared the Macrobid was first prescribed followed by Keflex sent recently from 4/19/24.     Will route to provider for an update.     Jeyson Quigley RN  [facility]     cephALEXin (KEFLEX) 500 MG capsule 14 capsule 0 4/19/2024 -- No   Sig - Route: Take 1 capsule (500 mg) by mouth 2 times daily - Oral   Sent to pharmacy as: Cephalexin 500 MG Oral Capsule (KEFLEX)   Class: E-Prescribe   Order: [id number]   E-Prescribing Status: Receipt confirmed by pharmacy (4/19/2024  5:57 PM CDT)                Rae Grijalva, MSN, RN   [facility]

## 2024-04-22 NOTE — PROGRESS NOTES
[facility] PHYSICIANS  SPECIALIZING IN BREAKTHROUGHS  Radiation Oncology    On Treatment Visit Note      Marylee Woods      Date: 2022   MRN: [id number]   : 1955  Diagnosis: Breast cancer      Reason for Visit:  On Radiation Treatment Visit     Treatment Summary to Date  Treatment Site: Lt breast Current Dose: 2080/2600 cGy Fractions: 4/5      Chemotherapy  Chemo concurrent with radx?: No    ED Visit/Hosiptal Admission: None    Treatment Breaks: None      Subjective:   Ms. Marinelli has completed 4 out of the 5 planned radiation therapy. She notes swelling of the left breast, especially in the nipple areolar region. She denies pain.     Nursing ROS:   Nutrition Alteration  Diet Type: Patient's Preference  Nutrition Note: appetite good  Skin  Skin Reaction: 1 - Faint erythema or dry desquamation  Skin Note: Sing aquaphor  CNS Alteration  CNS note: Hx of MS              Psychosocial  Psychosocial Note: feeling well, is mindful of her activity r/t her MS  Pain Assessment  0-10 Pain Scale: 0      Objective:   /55   Pulse 70   LMP  (LMP Unknown)   SpO2 98%   Gen: Appears well, in no acute distress  Skin: Very faint erythema around the nipple; mild edema in the breast    Labs:  CBC RESULTS: Recent Labs   Lab Test 21  0727   WBC 9.8   RBC 3.77*   HGB 12.6   HCT 36.6   MCV 97   MCH 33.4*   MCHC 34.4   RDW 15.3*        ELECTROLYTES:  Recent Labs   Lab Test 22  0830 21  1033   NA  --  137   POTASSIUM  --  3.8   CHLORIDE  --  102   JORDON 9.2 9.5   CO2  --  34*   BUN  --  15   CR 1.04 1.08*   GLC  --  80       Assessment:    Tolerating radiation therapy well.  All questions and concerns addressed.    Toxicities:  Fatigue: Grade 0: No toxicity  Dermatitis: Grade 1: Faint erythema or dry desquamation    Plan:   1. Continue current therapy.    2. Will finish treatment in one more fraction tomorrow.  3. Follow up in one month.    4. Follow up with Dr. Martinez as scheduled.        Mosaiq chart and setup information reviewed  Ports checked    Medication Review  Med Note: No changes per pt    Educational Topic Discussed  Education Instructions: D/C instructions reviewed        Vilma Morales MD/PhD  [phone] clinic  Pager [phone]    Please do not send letter to referring physician.        15 minutes were spent on the date of the encounter doing chart review, history and exam, documentation and further activities as noted above.       Vilma Morales MD   HPI: 68-year-old male past surgical history of right craniotomy for cerebral aneurysm on April 15, 2024, hypertension, COPD, emphysema, BPH presents for hematuria.  Patient denies abdominal pain, dysuria, fever, chills, nausea, vomiting, diarrhea, chest pain, difficulty breathing.  States he has been seeing nova blood in his [place] for the last 3 days, denies AC use, denies blood clot passage.  No other current complaints at this time.    ROS:   General: No fever, no chills, no malaise, no fatigue  Respiratory: No cough, no dyspnea, no pleuritic chest pain  Cardiac: no chest pain, no palpitations, no edema, no jvd  Abdomen: No abdominal pain, no nausea, no vomiting, no diarrhea  : See HPI  Musculoskeletal: No myalgia, no arthralgia  Neurologic: No headache, no vertigo, no paresthesia, no focal deficits  Skin: No rash, no evidence of trauma  All other ROS are negative    PE:  General: NAD; well appearing; A&O x3   Head: NC/AT  Eyes: PERRL, EOMI  ENT: Airway patent, mmm.  Pulmonary: CTA b/l, symmetric breath sounds. No W/R/R.  Cardiac: s1s2, RRR, no M,G,R, No JVD  Abdomen: +BS, ND, NT, soft, no guarding, no rebound, no masses , no rigidity  : No CVA TTP, no suprapubic TTP, nova hematuria and leg bag  Back: Normal  spine  Extremities: FROM, symmetric pulses, capillary refill < 2 seconds, no edema, 5/5 strength in b/l UE and LE  Skin: Healing surgical scar overlying parietalfrontal scalp  Neurologic: alert, speech clear, no focal deficits  Psych: nl mood/affect, nl insight.    MDM: 68-year-old male past surgical history of right craniotomy for cerebral aneurysm on April 15, 2024, hypertension, COPD, emphysema, BPH presents for hematuria.  POCUS suggestive of empty bladder without clot burden.  Will obtain CTAP for further evaluation of hematuria.  Consider malignancy.  Obtain CBC, CMP for baseline metabolic and infectious evaluation.  Provide IV hydration.  Consider discussing case with urology pending results.  Disposition pending results.

## 2024-04-22 NOTE — TELEPHONE ENCOUNTER
----- Message from Carolina Pulliam MD sent at 4/22/2024 11:46 AM CDT -----  Can you call Marylee and find out if she is feeling better from a UTI perspective?  If not I am going to change her medication slightly to Macrobid twice daily for 7 days.    Thanks,  Dr. Carolina Pulliam MD / [facility]

## 2024-04-22 NOTE — TELEPHONE ENCOUNTER
Writer called patient:  Left message to call back and ask to speak with an available triage nurse.    SAEID LowN, RN-BC  [facility]

## 2024-04-22 NOTE — TELEPHONE ENCOUNTER
----- Message from Carolina Pulliam MD sent at 4/22/2024 11:46 AM CDT -----  Can you call Marylee and find out if she is feeling better from a UTI perspective?  If not I am going to change her medication slightly to Macrobid twice daily for 7 days.        Patient returned call from message left from earlier encounter.  Patient reported wears depend due to incontinency, can't really tell if having urgency to urinate, but changes her incontinent pads 2-3 times daily depending on how much water drank. Reported residual burning pain occasionally with urination into pad but has improved.  Started the Keflex since Saturday.  Denied side affects from medication thus far other than fatigue.  Clinic RN advised to call back to clinic if symptoms failed to improve or new developing symptoms arise. Patient verbalized understood.    Chart reviewed, it appeared the Macrobid was first prescribed followed by Keflex sent recently from 4/19/24.    Will route to provider for an update.    Jeyson Quigley RN  [facility]    cephALEXin (KEFLEX) 500 MG capsule 14 capsule 0 4/19/2024 -- No   Sig - Route: Take 1 capsule (500 mg) by mouth 2 times daily - Oral   Sent to pharmacy as: Cephalexin 500 MG Oral Capsule (KEFLEX)   Class: E-Prescribe   Order: [id number]   E-Prescribing Status: Receipt confirmed by pharmacy (4/19/2024  5:57 PM CDT)      Patent

## 2024-04-23 RX ORDER — NITROFURANTOIN 25; 75 MG/1; MG/1
100 CAPSULE ORAL 2 TIMES DAILY
Qty: 14 CAPSULE | Refills: 0 | Status: SHIPPED | OUTPATIENT
Start: 2024-04-23 | End: 2024-04-30

## 2024-04-23 NOTE — TELEPHONE ENCOUNTER
Thank you!  Can you call and let her know I'd like to change her antibiotics?  I sent the new one to her pharmacy - she can continue taking what she is taking until she picks it up.    Note - it's the same as her daily preventative medicine (nitrofurantoin) but in a BID dosing.  She should HOLD her daily preventative dose while on this medicine.      Dr. Carolina Pulliam MD / [facility]

## 2024-04-23 NOTE — TELEPHONE ENCOUNTER
Pt will stop keflex  States she hasn't been taking her macrobid while on the keflex, but she will start taking BID. She states she does not need to  the signed Rx as she has a significant amount of this medication on hand already. Reviewed BID dosing for 7 days (holding the once daily dose) and then resuming maintenance dose, patient repeated back to writer accurately.   Patient states no other questions at this time.    SAEID CarnesN, RN (she/her)  [facility] RN

## 2024-04-30 LAB
ALBUMIN UR-MCNC: NEGATIVE MG/DL
APPEARANCE UR: CLEAR
BILIRUB UR QL STRIP: NEGATIVE
COLOR UR AUTO: ABNORMAL
GLUCOSE UR STRIP-MCNC: NEGATIVE MG/DL
HGB UR QL STRIP: NEGATIVE
KETONES UR STRIP-MCNC: NEGATIVE MG/DL
LEUKOCYTE ESTERASE UR QL STRIP: NEGATIVE
NITRATE UR QL: NEGATIVE
PH UR STRIP: 7 [PH] (ref 5–7)
RBC #/AREA URNS AUTO: ABNORMAL /HPF
SP GR UR STRIP: 1.02 (ref 1–1.03)
UROBILINOGEN UR STRIP-ACNC: 0.2 E.U./DL
WBC #/AREA URNS AUTO: ABNORMAL /HPF

## 2024-04-30 PROCEDURE — 81001 URINALYSIS AUTO W/SCOPE: CPT | Performed by: FAMILY MEDICINE

## 2024-04-30 NOTE — TELEPHONE ENCOUNTER
Please have her bring in another Urinalysis sample - there are standing orders in chart.      Also I do think she should see urology as we discussed recently - does she have upcoming appointment?      Dr. Carolina Pulliam MD / [facility]

## 2024-04-30 NOTE — TELEPHONE ENCOUNTER
Dr. Pulliam - home care RN states UTI sx have not resolved after tx, advisement appreciated on next steps.    Vesna, RN with [facility] calling. Patient has unresolved UTI sx after completing prescribed macrobid course  Sx include burning sensation with urination  Otherwise vitally stable, afebrile, denies other sx    BABATUNDE Logan, BSN, RN (she/her)  [facility] RN

## 2024-04-30 NOTE — TELEPHONE ENCOUNTER
Dr. Pulliam --  ROSALEE.     Writer called and spoke with home care nurse, Vesna, who was still at patient's home. UA will be dropped off.     Marylee has an urology appointment the end of May.     Noy Cristobal, SAEIDN RN  [facility]

## 2024-05-02 ENCOUNTER — LAB (OUTPATIENT)
Dept: LAB | Facility: CLINIC | Age: 69
End: 2024-05-02
Payer: MEDICARE

## 2024-05-02 ENCOUNTER — TELEPHONE (OUTPATIENT)
Dept: FAMILY MEDICINE | Facility: CLINIC | Age: 69
End: 2024-05-02

## 2024-05-02 DIAGNOSIS — E87.1 HYPONATREMIA: ICD-10-CM

## 2024-05-02 DIAGNOSIS — G35 MS (MULTIPLE SCLEROSIS) (H): Primary | ICD-10-CM

## 2024-05-02 PROCEDURE — 80048 BASIC METABOLIC PNL TOTAL CA: CPT

## 2024-05-02 PROCEDURE — 36415 COLL VENOUS BLD VENIPUNCTURE: CPT

## 2024-05-02 NOTE — TELEPHONE ENCOUNTER
Order/Referral Request    Who is requesting: [facility]    Orders being requested: Needing an order for ankle braces, Marylee needed to be ambulatory before they would approve and she now takes 4-6 steps with a walker. She needs a face to face with provider and [facility] face to face guild lines, ins plan requires, written document with specific codes provided.    Reason service is needed/diagnosis: MS    When are orders needed by: ASAP    Has this been discussed with Provider: Yes    Does patient have a preference on a Group/Provider/Facility? [facility]     Does patient have an appointment scheduled?: No    Where to send orders: Fax [phone]    Could we send this information to you in [facility] or would you prefer to receive a phone call?:   Patient would prefer a phone call   Okay to leave a detailed message?: Yes at Other phone number:  [facility] [phone]

## 2024-05-03 LAB
ANION GAP SERPL CALCULATED.3IONS-SCNC: 9 MMOL/L (ref 7–15)
BUN SERPL-MCNC: 26.7 MG/DL (ref 8–23)
CALCIUM SERPL-MCNC: 9.8 MG/DL (ref 8.8–10.2)
CHLORIDE SERPL-SCNC: 103 MMOL/L (ref 98–107)
CREAT SERPL-MCNC: 0.89 MG/DL (ref 0.51–0.95)
DEPRECATED HCO3 PLAS-SCNC: 28 MMOL/L (ref 22–29)
EGFRCR SERPLBLD CKD-EPI 2021: 70 ML/MIN/1.73M2
GLUCOSE SERPL-MCNC: 84 MG/DL (ref 70–99)
POTASSIUM SERPL-SCNC: 4.5 MMOL/L (ref 3.4–5.3)
SODIUM SERPL-SCNC: 140 MMOL/L (ref 135–145)

## 2024-05-03 NOTE — TELEPHONE ENCOUNTER
I placed order, but I placed order in the visit from 4/17 so the documentation would be available to fax if needed.

## 2024-05-06 NOTE — RESULT ENCOUNTER NOTE
Hello Marylee,     Thanks for coming to clinic.  The clinician who ordered these tests is currently out of the office, but we wanted to let you know that your results have been reviewed and there are no urgent or worrisome findings.      Your clinician will review your results upon their return and may get back to you with further information if needed.      Dr.Louisa Jero RICO / [facility]
No.

## 2024-05-08 DIAGNOSIS — E89.0 POSTABLATIVE HYPOTHYROIDISM: ICD-10-CM

## 2024-05-08 NOTE — TELEPHONE ENCOUNTER
PT needing a refill on the levothryoxine med  Pt has enough meds for 2 weeks.     4/6/24-The med had an odd local print from a hosp unit.    Pended med with pharmacy and will send to PCP.  PT will get refill picked up next week.    Dayami Nogueira RN-[facility]

## 2024-05-09 NOTE — TELEPHONE ENCOUNTER
It looks like her thyroid is managed by her endo, Dr. Bean.  Can you send to her team?    Dr. Carolina Pulliam MD / [facility]

## 2024-05-13 RX ORDER — LEVOTHYROXINE SODIUM 100 UG/1
TABLET ORAL
Qty: 90 TABLET | Refills: 3 | Status: SHIPPED | OUTPATIENT
Start: 2024-05-13 | End: 2024-06-15

## 2024-05-16 ENCOUNTER — TELEPHONE (OUTPATIENT)
Dept: FAMILY MEDICINE | Facility: CLINIC | Age: 69
End: 2024-05-16
Payer: MEDICARE

## 2024-05-16 NOTE — TELEPHONE ENCOUNTER
Relayed orders approval via confidential voicemail.  SAEID CarnesN, RN (she/her)  [facility] RN

## 2024-05-16 NOTE — TELEPHONE ENCOUNTER
Home Health Care    Reason for call:  Physical Therapist requesting verbal order approval from PCP     Orders are needed for this patient.  PT: one additional visit for PT for this week     Pt Provider: Dr. Pulliam     Phone Number Homecare PT can be reached at: [facility], [phone], okay to leave detailed msg on this phone         Rae Grijalva, MSN, RN   [facility]

## 2024-05-17 ENCOUNTER — TELEPHONE (OUTPATIENT)
Dept: FAMILY MEDICINE | Facility: CLINIC | Age: 69
End: 2024-05-17
Payer: MEDICARE

## 2024-05-17 NOTE — TELEPHONE ENCOUNTER
Dr Pulliam,    RN Chemi, [facility] calling for orders    SKN wound care one time a week for 8 weeks  and then also home health aide for bathing one time a week for 8 weeks     OK?    Mabel Bowman RN, BSN  [facility]

## 2024-05-20 NOTE — TELEPHONE ENCOUNTER
Left voicemail for home care giving verbal orders for wound care and home health aide as outlined below.    Myriam Gonzalez RN, BSN, PHN  [facility]

## 2024-05-22 ENCOUNTER — TELEPHONE (OUTPATIENT)
Dept: FAMILY MEDICINE | Facility: CLINIC | Age: 69
End: 2024-05-22

## 2024-05-22 ENCOUNTER — VIRTUAL VISIT (OUTPATIENT)
Dept: FAMILY MEDICINE | Facility: CLINIC | Age: 69
End: 2024-05-22
Payer: MEDICARE

## 2024-05-22 DIAGNOSIS — G35 MS (MULTIPLE SCLEROSIS) (H): Primary | ICD-10-CM

## 2024-05-22 PROCEDURE — G2211 COMPLEX E/M VISIT ADD ON: HCPCS | Mod: 95 | Performed by: FAMILY MEDICINE

## 2024-05-22 PROCEDURE — 99212 OFFICE O/P EST SF 10 MIN: CPT | Mod: 95 | Performed by: FAMILY MEDICINE

## 2024-05-22 NOTE — TELEPHONE ENCOUNTER
A scheduled appointment today, 5/22 @ 5:10pm    Per Dr. Pulliam: Do not have any forms. Is there anything she would like to discuss and if not, would be best to reschedule for an different day when forms have been recieved. Would be best to have her reach out to PT to resend those forms.    Called patient and left voicemail and would like to discuss about more information before today's visit. Will try her again in 5-10 minutes.    Tatyana Her  Visit Facilitator  [facility]

## 2024-05-22 NOTE — LETTER
"    5/22/2024        RE: Marylee Woods  [address]  [facility] [address]        Marylee is a 69 year old who is being evaluated via a billable video visit.    How would you like to obtain your AVS? MyChart  If the video visit is dropped, the invitation should be resent by: Send to e-mail at: [email or web address]  Will anyone else be joining your video visit? Yes: Manuel, . How would they like to receive their invitation? Send to e-mail at: [email or web address]  {If patient encounters technical issues they should call [phone] :027782}    {PROVIDER CHARTING PREFERENCE:609815}    Subjective  Marylee is a 69 year old, presenting for the following health issues:  Forms      5/22/2024     5:12 PM   Additional Questions   Roomed by Tatyana HARDING   Accompanied by Manuel         5/22/2024   Forms   Any forms needing to be completed Yes     Video Start Time: {video visit start/end time for provider to select:471170}    History of Present Illness       Reason for visit:  Forms        {SUPERLIST (Optional):315730}  {additonal problems for provider to add (Optional):418929}    {ROS Picklists (Optional):966301}      Objective   Vitals - Patient Reported  Pain Score: Mild Pain (3)  Pain Loc: Knee        Physical Exam   {video visit exam brief selected:607021}    {Diagnostic Test Results (Optional):209423}      Video-Visit Details    Type of service:  Video Visit   Video End Time:{video visit start/end time for provider to select:072957}  Originating Location (pt. Location): {video visit patient location:458728::\"Home\"}  {PROVIDER LOCATION On-site should be selected for visits conducted from your clinic location or adjoining [facility] hospital, academic office, or other nearby [facility] building. Off-site should be selected for all other provider locations, including home:980792}  Distant Location (provider location):  {virtual location provider:544249}  Platform used for Video Visit: {Virtual Visit " "Platforms:934064::\"Dimitris\"}  Signed Electronically by: Carolina Pulliam MD  {Email feedback regarding this note to [email or web address]   :392933}    Marylee is a 69 year old who is being evaluated via a billable video visit.    How would you like to obtain your AVS? MyChart  If the video visit is dropped, the invitation should be resent by: Send to e-mail at: [email or web address]  Will anyone else be joining your video visit? Yes: Manuel, . How would they like to receive their invitation? Send to e-mail at: [email or web address]  {If patient encounters technical issues they should call [phone] :245288}    Assessment & Plan    MS (multiple sclerosis) (H) - dx'd in 1993, followed by Dr. [facility]    Face to face completed and team will fax to [facility] [phone]      Subjective  Marylee is a 69 year old, presenting for the following health issues:  Forms        5/22/2024     5:12 PM   Additional Questions   Roomed by Tatyana HARDING   Accompanied by Manuel         5/22/2024   Forms   Any forms needing to be completed Yes     Video Start Time: 5:45 PM    History of Present Illness       Reason for visit:  La Almendarez is a 69-year-old with significant MS and recent multiple lower extremity fractures as well as osteopenia here today for face-to-face so that she can get AFOs covered so that she is hopefully able to ambulate which will help her bone health.  Currently she is wheelchair-bound.    She needs an AFO custom fit by an orthotist to be used during ambulation for both legs:  1.  She is expected to be ambulatory with the AFO  2.  She has a specific diagnosed weakness of her bilateral f feet and ankles which is multiple sclerosis  3.  Marylee require stabilization for medical reasons of multiple sclerosis.  4.  Marylee has the PET potential to benefit functionally as with appropriate AFOs she should be able to bear weight and walk " which will help her independence, reduce pressure sores, and help her osteoporosis.      Additionally she needs an AFO that is custom fabricated to be used during ambulation for both legs:  1.  Given her MS there is a need to control the knee, ankle, and foot in more than 1 plane given her overall weakness.  Additionally she has a documented history of pressure sores and so she needs a custom fabricating to prevent tissue injury.  Additionally the condition that is necessitating the AFO is MS which is permanent.      Additionally, for her left leg she needs a new brace even though she has had 1 within the last 5 years because:  1.  She has MS  2.  This MS is lifelong condition and she has functional limitations of being unable to bear weight  3.  She developed a pressure ulcer of her left heel since her last AFO and so needs a new one as the last one cause tissue damage and was ineffective.    Additionally, patient weakness has progressed significantly with recent right femur fracture and hospitalization.  This increased weakness is not addressed with her current AFO.                Objective   Vitals - Patient Reported  Pain Score: Mild Pain (3)  Pain Loc: Knee        Physical Exam   GENERAL: alert and no distress  EYES: Eyes grossly normal to inspection.  No discharge or erythema, or obvious scleral/conjunctival abnormalities.  RESP: No audible wheeze, cough, or visible cyanosis.    MS:MS - wheelchair bound  SKIN: Visible skin clear. No significant rash, abnormal pigmentation or lesions.  NEURO: Cranial nerves grossly intact.  Mentation and speech appropriate for age.  PSYCH: Appropriate affect, tone, and pace of words          Video-Visit Details    Type of service:  Video Visit   Video End Time:5:55 pm  Originating Location (pt. Location): Home  {PROVIDER LOCATION On-site should be selected for visits conducted from your clinic location or adjoining [facility] hospital, academic office, or other nearby [facility] building.  Off-site should be selected for all other provider locations, including home:223769}  Distant Location (provider location):  On-site  Platform used for Video Visit: Dimitris  Signed Electronically by: Carolina Pulliam MD  {Email feedback regarding this note to [email or web address]   :083642}    The longitudinal plan of care for the diagnosis(es)/condition(s) as documented were addressed during this visit. Due to the added complexity in care, I will continue to support Marylee in the subsequent management and with ongoing continuity of care.       Sincerely,        Carolina Pulliam MD

## 2024-05-22 NOTE — TELEPHONE ENCOUNTER
I completed face-to-face required for orthotics for patient.    I printed encounter from today as well as form from [employer].  Please fax back to them and keep a copy of the note for files.    Dr. Carolina Pulliam MD / [facility]

## 2024-05-22 NOTE — PROGRESS NOTES
Marylee is a 69 year old who is being evaluated via a billable video visit.    How would you like to obtain your AVS? MyChart  If the video visit is dropped, the invitation should be resent by: Send to e-mail at: [email or web address]  Will anyone else be joining your video visit? Yes: Manuel, . How would they like to receive their invitation? Send to e-mail at: [email or web address]      Assessment & Plan     MS (multiple sclerosis) (H) - dx'd in 1993, followed by Dr. Redman [facility]    Face to face completed and team will fax to [place] [phone]      Subjective   Marylee is a 69 year old, presenting for the following health issues:  Forms        5/22/2024     5:12 PM   Additional Questions   Roomed by Tatyana HARDING   Accompanied by Manuel         5/22/2024   Forms   Any forms needing to be completed Yes     Video Start Time: 5:45 PM    History of Present Illness       Reason for visit:  Forms        Malorie Almendarez is a 69-year-old with significant MS and recent multiple lower extremity fractures as well as osteopenia here today for face-to-face so that she can get AFOs covered so that she is hopefully able to ambulate which will help her bone health.  Currently she is wheelchair-bound.    She needs an AFO custom fit by an orthotist to be used during ambulation for both legs:  1.  She is expected to be ambulatory with the AFO  2.  She has a specific diagnosed weakness of her bilateral f feet and ankles which is multiple sclerosis  3.  Marylee require stabilization for medical reasons of multiple sclerosis.  4.  Marylee has the PET potential to benefit functionally as with appropriate AFOs she should be able to bear weight and walk which will help her independence, reduce pressure sores, and help her osteoporosis.      Additionally she needs an AFO that is custom fabricated to be used during ambulation for both legs:  1.  Given her MS there is a need to control the knee, ankle, and  foot in more than 1 plane given her overall weakness.  Additionally she has a documented history of pressure sores and so she needs a custom fabricating to prevent tissue injury.  Additionally the condition that is necessitating the AFO is MS which is permanent.      Additionally, for her left leg she needs a new brace even though she has had 1 within the last 5 years because:  1.  She has MS  2.  This MS is lifelong condition and she has functional limitations of being unable to bear weight  3.  She developed a pressure ulcer of her left heel since her last AFO and so needs a new one as the last one cause tissue damage and was ineffective.    Additionally, patient weakness has progressed significantly with recent right femur fracture and hospitalization.  This increased weakness is not addressed with her current AFO.                Objective    Vitals - Patient Reported  Pain Score: Mild Pain (3)  Pain Loc: Knee        Physical Exam   GENERAL: alert and no distress  EYES: Eyes grossly normal to inspection.  No discharge or erythema, or obvious scleral/conjunctival abnormalities.  RESP: No audible wheeze, cough, or visible cyanosis.    MS:MS - wheelchair bound  SKIN: Visible skin clear. No significant rash, abnormal pigmentation or lesions.  NEURO: Cranial nerves grossly intact.  Mentation and speech appropriate for age.  PSYCH: Appropriate affect, tone, and pace of words          Video-Visit Details    Type of service:  Video Visit   Video End Time:5:55 pm  Originating Location (pt. Location): Home    Distant Location (provider location):  On-site  Platform used for Video Visit: Dimitris  Signed Electronically by: Carolina Pulliam MD      The longitudinal plan of care for the diagnosis(es)/condition(s) as documented were addressed during this visit. Due to the added complexity in care, I will continue to support Marylee in the subsequent management and with ongoing continuity of care.

## 2024-05-22 NOTE — LETTER
5/22/2024        RE: Marylee Woods  [address]  [facility] [address]        Marylee is a 69 year old who is being evaluated via a billable video visit.    How would you like to obtain your AVS? MyChart  If the video visit is dropped, the invitation should be resent by: Send to e-mail at: [email or web address]  Will anyone else be joining your video visit? Yes: Manuel, . How would they like to receive their invitation? Send to e-mail at: [email or web address]      Assessment & Plan    MS (multiple sclerosis) (H) - dx'd in 1993, followed by Dr. Redman [facility]    Face to face completed and team will fax to [facility] [phone]      Subjective  Marylee is a 69 year old, presenting for the following health issues:  Forms        5/22/2024     5:12 PM   Additional Questions   Roomed by Tatyana HARDING   Accompanied by Manuel         5/22/2024   Forms   Any forms needing to be completed Yes     Video Start Time: 5:45 PM    History of Present Illness       Reason for visit:  Forms        Malorie Almendarez is a 69-year-old with significant MS and recent multiple lower extremity fractures as well as osteopenia here today for face-to-face so that she can get AFOs covered so that she is hopefully able to ambulate which will help her bone health.  Currently she is wheelchair-bound.    She needs an AFO custom fit by an orthotist to be used during ambulation for both legs:  1.  She is expected to be ambulatory with the AFO  2.  She has a specific diagnosed weakness of her bilateral f feet and ankles which is multiple sclerosis  3.  Marylee require stabilization for medical reasons of multiple sclerosis.  4.  Marylee has the PET potential to benefit functionally as with appropriate AFOs she should be able to bear weight and walk which will help her independence, reduce pressure sores, and help her osteoporosis.      Additionally she needs an AFO that is custom fabricated to be used during  ambulation for both legs:  1.  Given her MS there is a need to control the knee, ankle, and foot in more than 1 plane given her overall weakness.  Additionally she has a documented history of pressure sores and so she needs a custom fabricating to prevent tissue injury.  Additionally the condition that is necessitating the AFO is MS which is permanent.      Additionally, for her left leg she needs a new brace even though she has had 1 within the last 5 years because:  1.  She has MS  2.  This MS is lifelong condition and she has functional limitations of being unable to bear weight  3.  She developed a pressure ulcer of her left heel since her last AFO and so needs a new one as the last one cause tissue damage and was ineffective.    Additionally, patient weakness has progressed significantly with recent right femur fracture and hospitalization.  This increased weakness is not addressed with her current AFO.                Objective   Vitals - Patient Reported  Pain Score: Mild Pain (3)  Pain Loc: Knee        Physical Exam   GENERAL: alert and no distress  EYES: Eyes grossly normal to inspection.  No discharge or erythema, or obvious scleral/conjunctival abnormalities.  RESP: No audible wheeze, cough, or visible cyanosis.    MS:MS - wheelchair bound  SKIN: Visible skin clear. No significant rash, abnormal pigmentation or lesions.  NEURO: Cranial nerves grossly intact.  Mentation and speech appropriate for age.  PSYCH: Appropriate affect, tone, and pace of words          Video-Visit Details    Type of service:  Video Visit   Video End Time:5:55 pm  Originating Location (pt. Location): Home    Distant Location (provider location):  On-site  Platform used for Video Visit: Dimitris  Signed Electronically by: Carolina Pulliam MD      The longitudinal plan of care for the diagnosis(es)/condition(s) as documented were addressed during this visit. Due to the added complexity in care, I will continue to support Marylee in  the subsequent management and with ongoing continuity of care.      Sincerely,        Carolina Pulliam MD

## 2024-05-23 ENCOUNTER — TELEPHONE (OUTPATIENT)
Dept: FAMILY MEDICINE | Facility: CLINIC | Age: 69
End: 2024-05-23
Payer: MEDICARE

## 2024-05-23 ENCOUNTER — TRANSFERRED RECORDS (OUTPATIENT)
Dept: HEALTH INFORMATION MANAGEMENT | Facility: CLINIC | Age: 69
End: 2024-05-23
Payer: MEDICARE

## 2024-05-23 NOTE — TELEPHONE ENCOUNTER
I completed this last night - I think.  Can you check what we faxed last night and see if it's the same or additional forms that are needed?  Dr. Carolina Pulliam MD / [facility]

## 2024-05-23 NOTE — TELEPHONE ENCOUNTER
Forms/Letter Request    Type of form/letter: OTHER:  AFO standard written order       Do we have the form/letter: Yes: placed in care team 3 providers sign folder    Who is the form from? [facility] (if other please explain)    Where did/will the form come from? form was faxed in    When is form/letter needed by: urgent    How would you like the form/letter returned: Fax : [phone]

## 2024-06-10 ENCOUNTER — TELEPHONE (OUTPATIENT)
Dept: FAMILY MEDICINE | Facility: CLINIC | Age: 69
End: 2024-06-10
Payer: MEDICARE

## 2024-06-10 NOTE — TELEPHONE ENCOUNTER
Dr Pulliam,     Please see Home Care order request and approve if agree.    The Home Care/Assisted Living/Nursing Facility is calling regarding an established patient.  Has the patient seen Home Care in the past or is currently residing in Assisted Living or Nursing Facility? Yes.     [facility] calling from [facility] requesting the following orders that are within the Home Care, Assisted Living or Nursing Home Eval and Treatment standing order and can be signed as standing order signature required by RN.    Preferred Call Back Number: [phone]    PT/OT/Speech Therapy     Any additional Orders:  Are there any orders requested, not stated above, that are outside of the standing order and must be routed to a licensed practitioner for approval?    Yes: Caller is requesting for PT extension:   Starting 6/11/24:  PT 1xwk for 1 wk, then 2 times a wk for 2 wks, then 1 x wk for 1 week, then 2 times a week for 1 wk then 1 times a week for 1 wk due to upcoming holidays. Pt is getting new AFO this Friday for bilateral.     Writer has verified Requestor will send fax to have orders signed.     Destiney LOUIS RN  [facility]

## 2024-06-10 NOTE — TELEPHONE ENCOUNTER
RNs:    I approve of requested home care orders.    Thanks much,  Dr. Carolina Pulliam MD / [facility]

## 2024-06-11 ENCOUNTER — OFFICE VISIT (OUTPATIENT)
Dept: ENDOCRINOLOGY | Facility: CLINIC | Age: 69
End: 2024-06-11
Payer: MEDICARE

## 2024-06-11 ENCOUNTER — LAB (OUTPATIENT)
Dept: LAB | Facility: CLINIC | Age: 69
End: 2024-06-11
Payer: MEDICARE

## 2024-06-11 ENCOUNTER — TELEPHONE (OUTPATIENT)
Dept: FAMILY MEDICINE | Facility: CLINIC | Age: 69
End: 2024-06-11

## 2024-06-11 VITALS — SYSTOLIC BLOOD PRESSURE: 162 MMHG | OXYGEN SATURATION: 99 % | DIASTOLIC BLOOD PRESSURE: 91 MMHG | HEART RATE: 75 BPM

## 2024-06-11 DIAGNOSIS — E89.0 POSTABLATIVE HYPOTHYROIDISM: ICD-10-CM

## 2024-06-11 DIAGNOSIS — M81.0 OSTEOPOROSIS OF MULTIPLE SITES: ICD-10-CM

## 2024-06-11 DIAGNOSIS — E89.0 POSTABLATIVE HYPOTHYROIDISM: Primary | ICD-10-CM

## 2024-06-11 DIAGNOSIS — I10 HYPERTENSION GOAL BP (BLOOD PRESSURE) < 140/90: Primary | ICD-10-CM

## 2024-06-11 LAB
CALCIUM SERPL-MCNC: 10.3 MG/DL (ref 8.8–10.2)
MAGNESIUM SERPL-MCNC: 1.8 MG/DL (ref 1.7–2.3)
PHOSPHATE SERPL-MCNC: 3.8 MG/DL (ref 2.5–4.5)
T4 FREE SERPL-MCNC: 1.1 NG/DL (ref 0.9–1.7)
TSH SERPL DL<=0.005 MIU/L-ACNC: 4.81 UIU/ML (ref 0.3–4.2)

## 2024-06-11 PROCEDURE — 84100 ASSAY OF PHOSPHORUS: CPT | Performed by: PATHOLOGY

## 2024-06-11 PROCEDURE — 82040 ASSAY OF SERUM ALBUMIN: CPT | Performed by: PATHOLOGY

## 2024-06-11 PROCEDURE — 99215 OFFICE O/P EST HI 40 MIN: CPT | Performed by: INTERNAL MEDICINE

## 2024-06-11 PROCEDURE — 84439 ASSAY OF FREE THYROXINE: CPT | Performed by: PATHOLOGY

## 2024-06-11 PROCEDURE — 99000 SPECIMEN HANDLING OFFICE-LAB: CPT | Performed by: PATHOLOGY

## 2024-06-11 PROCEDURE — 84443 ASSAY THYROID STIM HORMONE: CPT | Performed by: PATHOLOGY

## 2024-06-11 PROCEDURE — 36415 COLL VENOUS BLD VENIPUNCTURE: CPT | Performed by: PATHOLOGY

## 2024-06-11 PROCEDURE — 82306 VITAMIN D 25 HYDROXY: CPT | Performed by: INTERNAL MEDICINE

## 2024-06-11 PROCEDURE — 82310 ASSAY OF CALCIUM: CPT | Performed by: PATHOLOGY

## 2024-06-11 PROCEDURE — G2211 COMPLEX E/M VISIT ADD ON: HCPCS | Performed by: INTERNAL MEDICINE

## 2024-06-11 PROCEDURE — 83735 ASSAY OF MAGNESIUM: CPT | Performed by: PATHOLOGY

## 2024-06-11 RX ORDER — ALBUTEROL SULFATE 0.83 MG/ML
2.5 SOLUTION RESPIRATORY (INHALATION)
OUTPATIENT
Start: 2024-06-18

## 2024-06-11 RX ORDER — DIPHENHYDRAMINE HYDROCHLORIDE 50 MG/ML
50 INJECTION INTRAMUSCULAR; INTRAVENOUS
Start: 2024-06-18

## 2024-06-11 RX ORDER — EPINEPHRINE 1 MG/ML
0.3 INJECTION, SOLUTION, CONCENTRATE INTRAVENOUS EVERY 5 MIN PRN
OUTPATIENT
Start: 2024-06-18

## 2024-06-11 RX ORDER — ALBUTEROL SULFATE 90 UG/1
1-2 AEROSOL, METERED RESPIRATORY (INHALATION)
Start: 2024-06-18

## 2024-06-11 ASSESSMENT — PAIN SCALES - GENERAL: PAINLEVEL: NO PAIN (0)

## 2024-06-11 NOTE — TELEPHONE ENCOUNTER
RNs:    I approve of requested home care orders.    Thanks much,  Dr. Carolina Pulliam MD / [facility]

## 2024-06-11 NOTE — PROGRESS NOTES
Endocrinology and Diabetes Clinic       Follow upf or Osteoporosus and psot-ablative hypothyroidism      ASSESSMENT/PLAN:     1.  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  Recent TSH has been elevated, recheck    2.  Osteoporosis, severe, based on history of femoral fractures after falling from a standing height, in 2009 and 2018 and after a wheelchair accident in 11/2023.  Left total hip T-score -6 in 3/27/2024, risk factors history of smoking, menopause, aging, Graves disease, low dietary calcium intake      Recommendations:  Lab check today VitD, Ca Creat   Start EVenity  Follow up in 10 m     The Longitudinal plan of care for osteoporosis condition was addressed during this visit. Due to added complexity of care, we will continue to support Marylee , and the subsequent management of this condition(s) and with the ongoing continuity of care of this condition.    40 minutes spent on the date of the encounter doing chart review, review of test results, interpretation of tests, patient visit and documentation     This note was generated using computer recognized voice recognition. This might result in some expected imperfection.    Estela Flowers MD  Endocrinology and Diabetes  Telephone contact:  [facility] [place] [phone]  [facility] [phone]           =========================================================================================    Interval history  Previously seen by Dr Johnston, Evenity injections were discussed however due to bone density results it was delayed    Osteoporosis   - weight stale,   - since last visit falls no, fractures no    Most recen DXA scan 3/27/24 T-score -6 left total hip    Osteoporosis medication since last visit: last Reclast infusion 3/23, tolerated this well  received Reclast infusion in 3/23, 2/22  Treatment with Fosamax was recommended by her  orthopedic surgeon, in 2018. As per patient, she started Fosamax in 2020 and she took it for approximately 1 year.    Calcium supplement 2 daily  Vitamin D: no  Protein intake good  Physical activity: standing twice a week, about 1-2 min    Prior fractures:  2009 Right nondisplaced intertrochanteric fracture.  2013 Left tibial and fibular fracture   10/2018 Transverse fracture of the distal left femoral diaphysis, after falling from standing height.  6/2020 Left tibial plateau fracture   11/2023 right distal femoral fracture and left malleolus fracture after the wheelchair felt on a side.  Orthopedics following and managing conservatively.    Hypothyroidism  Has been takign LT4 100 mcg in am as directed    HPI  1.  Post ablative hypothyroidism, which ensured following radioiodine treatment for Graves' disease  Marylee follows up with Dr. Redman for MS, at [facility].  The patient has had the thyroid hormone levels checked quite frequently, through her neurology clinic, given prior history of treatment with Lemtrada, in [id number].  She was formally diagnosed with seropositive Graves' disease in October 2020.  Thyroid-stimulating immunoglobulins were positive at 6.4. Subsequently, she underwent treatment with 9.3 mCi I-131, on 2/9/2021.  Prior to treatment, the scan revealed a 24-hour uptake of 87.5%, with a computer estimated weight of 42.2 g.  The radiotracer uptake was uniform, with the exception of the caudal region in the inferior left thyroid lobe, which corresponded to a nodule biopsied in 2017.     In 2017, she was incidentally diagnosed with thyroid nodules during a CAT scan.  The thyroid ultrasound from 9/7/2017 revealed multiple thyroid nodules.  The left inferior #4 thyroid nodule, measuring 2.2 cm, was benign on biopsy. Overall, the nodules had a benign ultrasound appearance, mostly spongiform.  The biopsied nodule had an internal macrocalcification, minimal internal vascularity,  but no definite features suggestive of malignancy.  The patient has no prior history of radiation exposure or a family history of thyroid disease.  On the follow-up thyroid ultrasound from 1/14/2021, there were no significant changes of the thyroid nodules.  For the last 6-7 years, she has been using a wheelchair, prior to hip fracture in 11/23, she was able to stand and pivot, is working with  PT and homehealth to get back to standing and taking a few steps.    She remains on treatment with Pepcid, at night.  Marylee smoked since age 17, with intermittent breaks, generally less than half a pack a day.  She quit smoking in 2022.   She has both upper and lower dentures.   The left foot has a heel sore and wears a boot.             Past Medical History  Past Medical History:   Diagnosis Date    Gastro-oesophageal reflux disease     Multiple sclerosis (H) 1990s   May 2020, diagnosed with Covid  Depression  Pancytopenia dating back to at least 2014  Hyperlipidemia  Lung nodules  Candida esophagitis  UTI  R intertrochanteric femoral fracture 2009   Left tibial and fibular fracture in 2013  Left tibial plateau fracture June 2020   Left femoral fracture in 10/2018, after falling from standing height (above the knee)    Medications    Current Outpatient Medications:     acetaminophen (TYLENOL) 500 MG tablet, Take 1-2 tablets (500-1,000 mg) by mouth every 8 hours as needed for mild pain or fever (greater than 101 degrees), Disp: , Rfl:     acyclovir (ZOVIRAX) 400 MG tablet, Take 1 tablet (400 mg) by mouth every 12 hours, Disp: 180 tablet, Rfl: 3    Armodafinil 200 MG TABS, Take 200 mg by mouth every morning, Disp: , Rfl:     baclofen (LIORESAL) 10 MG tablet, Take 40 mg by mouth at bedtime In addition to morning and noon doses, Disp: , Rfl:     baclofen (LIORESAL) 10 MG tablet, Take 10 mg by mouth 2 times daily 0800 and noon (in addition to bedtime dose), Disp: , Rfl:     DULoxetine (CYMBALTA) 20 MG capsule, TAKE 1 CAPSULE BY  MOUTH ONCE DAILY ALONG  WITH  A  60  MG  TABLET  FOR  A  TOTAL  DOSE  OF  80  MG  DAILY, Disp: 90 capsule, Rfl: 1    DULoxetine (CYMBALTA) 60 MG capsule, Take 1 capsule by mouth once daily, Disp: 90 capsule, Rfl: 3    famotidine (PEPCID) 40 MG tablet, TAKE 1 TABLET BY MOUTH AT BEDTIME, Disp: 90 tablet, Rfl: 0    gabapentin (NEURONTIN) 300 MG capsule, Take 3 capsules (900 mg) by mouth 2 times daily, Disp: , Rfl:     latanoprost (XALATAN) 0.005 % ophthalmic solution, INSTILL 1 DROP INTO BOTH EYES EVERY DAY AS DIRECTED PT WILL NEED TO BE SEEN FOR FUTURE REFILLS, Disp: , Rfl:     levothyroxine (SYNTHROID/LEVOTHROID) 100 MCG tablet, SIX TIMES A WEEK (skip Sundays), Disp: 90 tablet, Rfl: 3    losartan (COZAAR) 50 MG tablet, Take 1 tablet (50 mg) by mouth 2 times daily, Disp: 180 tablet, Rfl: 4    nitroFURantoin macrocrystal-monohydrate (MACROBID) 100 MG capsule, Take 1 capsule (100 mg) by mouth daily, Disp: 90 capsule, Rfl: 4    oxyCODONE (ROXICODONE) 5 MG tablet, Take 0.5 tablets (2.5 mg) by mouth every 6 hours as needed for moderate pain, Disp: 15 tablet, Rfl: 0    polyethylene glycol (MIRALAX) 17 GM/Dose powder, Take 17 g by mouth daily as needed for constipation, Disp: , Rfl:     ublituximab-xiiy (BRIUMVI) 150 MG/6ML injection, Administer BRIUMVI 25mg IV Day 1 150mg, Day 15 450mg, 24 weeks from initial dose 450mg, Disp: , Rfl:     Allergies  Allergies   Allergen Reactions    Bactrim [Sulfamethoxazole-Trimethoprim] Hallucination    Hydrochlorothiazide      Hyponatremia    Sulfamethizole     Amantadine      hallucinations    Budesonide     Budesonide-Formoterol Fumarate Rash     Family History  family history includes Cancer in her maternal grandfather; Heart Disease in her father, maternal grandmother, mother, and paternal grandfather.  She has positive family of autoimmune disease. Her mom has lupus and hypopituitarism. No family history of thyroid disease.  Maternal grandfather had throat cancer. Type 1 diabetes -  maternal grandmother. Diabetes - maternal aunt.     Social History  , 2 children.  She denies drinking alcohol or using illicit drugs.  History of long-term smoking.  Social History     Tobacco Use    Smoking status: Former     Current packs/day: 0.00     Types: Cigarettes     Quit date: 2022     Years since quittin.4     Passive exposure: Current    Smokeless tobacco: Never    Tobacco comments:     Once in a while    Vaping Use    Vaping status: Never Used   Substance Use Topics    Alcohol use: Yes     Alcohol/week: 0.0 standard drinks of alcohol     Comment: occasional     Drug use: No      Physical Exam   BP (!) 162/91   Pulse 75   LMP 2004 (Approximate)   SpO2 99%     Wt Readings from Last 10 Encounters:   24 91.1 kg (200 lb 12.8 oz)   24 69.4 kg (153 lb)   24 58.7 kg (129 lb 6.6 oz)   23 62.6 kg (138 lb)   23 62.6 kg (138 lb 0.1 oz)   23 66.1 kg (145 lb 11.2 oz)   23 67.6 kg (149 lb)   22 59 kg (130 lb)   22 59 kg (130 lb)   22 59 kg (130 lb)     General appearance: Pleasant, lying in an electric scooter , here with  Manuel  Psychiatric: affect and judgment normal   Skin: No abdominal striae, no hirsutism or acne    DATA REVIEW  Lab Results   Component Value Date    JORDON 9.8 2024    JORDON 10.5 (H) 2024    JORDON 8.7 (L) 2024    JORDON 8.3 (L) 2024    ALBUMIN 4.1 2024    ALT 12 2024    PHOS 3.7 2024    PTHI 70 (H) 2024    PTHI 43 2023    PTHI 96 (H) 2021    PTHI 30 04/15/2013    AST 22 2024    BILITOTAL 0.2 2024    CR 0.89 2024    CR 0.68 2024    CR 0.67 2024     2024    TSH 11.28 (H) 2024    ALKPHOS 89 2024    VITDT 28 (L) 04/15/2013    HGB 9.8 (L) 2024       TSH   Date Value Ref Range Status   2024 11.28 (H) 0.30 - 4.20 uIU/mL Final   2021 1.33 0.40 - 4.00 mU/L Final   2021 191.68 (H) 0.40 -  4.00 mU/L Final

## 2024-06-11 NOTE — LETTER
6/11/2024       RE: Marylee Woods  [address]  [facility] [address]     Dear Colleague,    Thank you for referring your patient, Marylee Woods, to the [facility] [place] at [facility]. Please see a copy of my visit note below.        Endocrinology and Diabetes Clinic       Follow upf or Osteoporosus and psot-ablative hypothyroidism      ASSESSMENT/PLAN:     1.  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  Recent TSH has been elevated, recheck    2.  Osteoporosis, severe, based on history of femoral fractures after falling from a standing height, in 2009 and 2018 and after a wheelchair accident in 11/2023.  Left total hip T-score -6 in 3/27/2024, risk factors history of smoking, menopause, aging, Graves disease, low dietary calcium intake      Recommendations:  Lab check today VitD, Ca Creat   Start EVenity  Follow up in 10 m     The Longitudinal plan of care for osteoporosis condition was addressed during this visit. Due to added complexity of care, we will continue to support Marylee , and the subsequent management of this condition(s) and with the ongoing continuity of care of this condition.    40 minutes spent on the date of the encounter doing chart review, review of test results, interpretation of tests, patient visit and documentation     This note was generated using computer recognized voice recognition. This might result in some expected imperfection.    Estela Flowers MD  Endocrinology and Diabetes  Telephone contact:  [facility] [phone]  [facility] [phone]           =========================================================================================    Interval history  Previously seen by Dr Johnston, Evenity injections were discussed however due to bone density results it was  delayed    Osteoporosis   - weight stale,   - since last visit falls no, fractures no    Most recen DXA scan 3/27/24 T-score -6 left total hip    Osteoporosis medication since last visit: last Reclast infusion 3/23, tolerated this well  received Reclast infusion in 3/23, 2/22  Treatment with Fosamax was recommended by her orthopedic surgeon, in 2018. As per patient, she started Fosamax in 2020 and she took it for approximately 1 year.    Calcium supplement 2 daily  Vitamin D: no  Protein intake good  Physical activity: standing twice a week, about 1-2 min    Prior fractures:  2009 Right nondisplaced intertrochanteric fracture.  2013 Left tibial and fibular fracture   10/2018 Transverse fracture of the distal left femoral diaphysis, after falling from standing height.  6/2020 Left tibial plateau fracture   11/2023 right distal femoral fracture and left malleolus fracture after the wheelchair felt on a side.  Orthopedics following and managing conservatively.    Hypothyroidism  Has been takign LT4 100 mcg in am as directed    HPI  1.  Post ablative hypothyroidism, which ensured following radioiodine treatment for Graves' disease  Marylee follows up with Dr. Redman for MS, at [facility].  The patient has had the thyroid hormone levels checked quite frequently, through her neurology clinic, given prior history of treatment with Lemtrada, in [id number].  She was formally diagnosed with seropositive Graves' disease in October 2020.  Thyroid-stimulating immunoglobulins were positive at 6.4. Subsequently, she underwent treatment with 9.3 mCi I-131, on 2/9/2021.  Prior to treatment, the scan revealed a 24-hour uptake of 87.5%, with a computer estimated weight of 42.2 g.  The radiotracer uptake was uniform, with the exception of the caudal region in the inferior left thyroid lobe, which corresponded to a nodule biopsied in 2017.     In 2017, she was incidentally diagnosed with thyroid nodules during a  CAT scan.  The thyroid ultrasound from 9/7/2017 revealed multiple thyroid nodules.  The left inferior #4 thyroid nodule, measuring 2.2 cm, was benign on biopsy. Overall, the nodules had a benign ultrasound appearance, mostly spongiform.  The biopsied nodule had an internal macrocalcification, minimal internal vascularity, but no definite features suggestive of malignancy.  The patient has no prior history of radiation exposure or a family history of thyroid disease.  On the follow-up thyroid ultrasound from 1/14/2021, there were no significant changes of the thyroid nodules.  For the last 6-7 years, she has been using a wheelchair, prior to hip fracture in 11/23, she was able to stand and pivot, is working with  PT and homehealth to get back to standing and taking a few steps.    She remains on treatment with Pepcid, at night.  Marylee smoked since age 17, with intermittent breaks, generally less than half a pack a day.  She quit smoking in 2022.   She has both upper and lower dentures.   The left foot has a heel sore and wears a boot.             Past Medical History  Past Medical History:   Diagnosis Date    Gastro-oesophageal reflux disease     Multiple sclerosis (H) 1990s   May 2020, diagnosed with Covid  Depression  Pancytopenia dating back to at least 2014  Hyperlipidemia  Lung nodules  Candida esophagitis  UTI  R intertrochanteric femoral fracture 2009   Left tibial and fibular fracture in 2013  Left tibial plateau fracture June 2020   Left femoral fracture in 10/2018, after falling from standing height (above the knee)    Medications    Current Outpatient Medications:     acetaminophen (TYLENOL) 500 MG tablet, Take 1-2 tablets (500-1,000 mg) by mouth every 8 hours as needed for mild pain or fever (greater than 101 degrees), Disp: , Rfl:     acyclovir (ZOVIRAX) 400 MG tablet, Take 1 tablet (400 mg) by mouth every 12 hours, Disp: 180 tablet, Rfl: 3    Armodafinil 200 MG TABS, Take 200 mg by mouth every  morning, Disp: , Rfl:     baclofen (LIORESAL) 10 MG tablet, Take 40 mg by mouth at bedtime In addition to morning and noon doses, Disp: , Rfl:     baclofen (LIORESAL) 10 MG tablet, Take 10 mg by mouth 2 times daily 0800 and noon (in addition to bedtime dose), Disp: , Rfl:     DULoxetine (CYMBALTA) 20 MG capsule, TAKE 1 CAPSULE BY MOUTH ONCE DAILY ALONG  WITH  A  60  MG  TABLET  FOR  A  TOTAL  DOSE  OF  80  MG  DAILY, Disp: 90 capsule, Rfl: 1    DULoxetine (CYMBALTA) 60 MG capsule, Take 1 capsule by mouth once daily, Disp: 90 capsule, Rfl: 3    famotidine (PEPCID) 40 MG tablet, TAKE 1 TABLET BY MOUTH AT BEDTIME, Disp: 90 tablet, Rfl: 0    gabapentin (NEURONTIN) 300 MG capsule, Take 3 capsules (900 mg) by mouth 2 times daily, Disp: , Rfl:     latanoprost (XALATAN) 0.005 % ophthalmic solution, INSTILL 1 DROP INTO BOTH EYES EVERY DAY AS DIRECTED PT WILL NEED TO BE SEEN FOR FUTURE REFILLS, Disp: , Rfl:     levothyroxine (SYNTHROID/LEVOTHROID) 100 MCG tablet, SIX TIMES A WEEK (skip Sundays), Disp: 90 tablet, Rfl: 3    losartan (COZAAR) 50 MG tablet, Take 1 tablet (50 mg) by mouth 2 times daily, Disp: 180 tablet, Rfl: 4    nitroFURantoin macrocrystal-monohydrate (MACROBID) 100 MG capsule, Take 1 capsule (100 mg) by mouth daily, Disp: 90 capsule, Rfl: 4    oxyCODONE (ROXICODONE) 5 MG tablet, Take 0.5 tablets (2.5 mg) by mouth every 6 hours as needed for moderate pain, Disp: 15 tablet, Rfl: 0    polyethylene glycol (MIRALAX) 17 GM/Dose powder, Take 17 g by mouth daily as needed for constipation, Disp: , Rfl:     ublituximab-xiiy (BRIUMVI) 150 MG/6ML injection, Administer BRIUMVI 25mg IV Day 1 150mg, Day 15 450mg, 24 weeks from initial dose 450mg, Disp: , Rfl:     Allergies  Allergies   Allergen Reactions    Bactrim [Sulfamethoxazole-Trimethoprim] Hallucination    Hydrochlorothiazide      Hyponatremia    Sulfamethizole     Amantadine      hallucinations    Budesonide     Budesonide-Formoterol Fumarate Rash     Family  History  family history includes Cancer in her maternal grandfather; Heart Disease in her father, maternal grandmother, mother, and paternal grandfather.  She has positive family of autoimmune disease. Her mom has lupus and hypopituitarism. No family history of thyroid disease.  Maternal grandfather had throat cancer. Type 1 diabetes - maternal grandmother. Diabetes - maternal aunt.     Social History  , 2 children.  She denies drinking alcohol or using illicit drugs.  History of long-term smoking.  Social History     Tobacco Use    Smoking status: Former     Current packs/day: 0.00     Types: Cigarettes     Quit date: 2022     Years since quittin.4     Passive exposure: Current    Smokeless tobacco: Never    Tobacco comments:     Once in a while    Vaping Use    Vaping status: Never Used   Substance Use Topics    Alcohol use: Yes     Alcohol/week: 0.0 standard drinks of alcohol     Comment: occasional     Drug use: No      Physical Exam   BP (!) 162/91   Pulse 75   LMP 2004 (Approximate)   SpO2 99%     Wt Readings from Last 10 Encounters:   24 91.1 kg (200 lb 12.8 oz)   24 69.4 kg (153 lb)   24 58.7 kg (129 lb 6.6 oz)   23 62.6 kg (138 lb)   23 62.6 kg (138 lb 0.1 oz)   23 66.1 kg (145 lb 11.2 oz)   23 67.6 kg (149 lb)   22 59 kg (130 lb)   22 59 kg (130 lb)   22 59 kg (130 lb)     General appearance: Pleasant, lying in an electric scooter , here with  Manuel  Psychiatric: affect and judgment normal   Skin: No abdominal striae, no hirsutism or acne    DATA REVIEW  Lab Results   Component Value Date    JORDON 9.8 2024    JORDON 10.5 (H) 2024    JORDON 8.7 (L) 2024    JORDON 8.3 (L) 2024    ALBUMIN 4.1 2024    ALT 12 2024    PHOS 3.7 2024    PTHI 70 (H) 2024    PTHI 43 2023    PTHI 96 (H) 2021    PTHI 30 04/15/2013    AST 22 2024    BILITOTAL 0.2 2024    CR 0.89  05/02/2024    CR 0.68 04/17/2024    CR 0.67 04/07/2024     05/02/2024    TSH 11.28 (H) 03/27/2024    ALKPHOS 89 04/05/2024    VITDT 28 (L) 04/15/2013    HGB 9.8 (L) 04/17/2024       TSH   Date Value Ref Range Status   03/27/2024 11.28 (H) 0.30 - 4.20 uIU/mL Final   11/05/2021 1.33 0.40 - 4.00 mU/L Final   06/30/2021 191.68 (H) 0.40 - 4.00 mU/L Final       Estela Flowers MD

## 2024-06-11 NOTE — TELEPHONE ENCOUNTER
Forms/Letter Request    Type of form/letter: OTHER: patient care order       Do we have the form/letter: Yes: placed in care team 3 providers sign folder    Who is the form from? Home care    Where did/will the form come from? form was faxed in    When is form/letter needed by: asap    How would you like the form/letter returned: Fax : [phone]

## 2024-06-11 NOTE — TELEPHONE ENCOUNTER
Called Home care nurse and gave approved verbal order. She verbalized understanding.      Cleve Lester BSN RN  [facility]

## 2024-06-11 NOTE — TELEPHONE ENCOUNTER
Karis with [facility] calling to report hypertension    Blood pressure 161/93 at rest today    Yesterday systolic BP in the 160s as well    Takes losartan BID as prescribed without any issues    She is asymptomatic without any new symptoms of headaches, worsening blurry vision, chest pain, dizziness.    Pharmacy pended if clinician wanting to make medication changes.    Justine Garcia, SAEIDN RN  [facility]

## 2024-06-11 NOTE — PATIENT INSTRUCTIONS
Continue Calcium and Vitamin D    Start Evenity injections for 12 monthly injections soon    Continue with physiotherapy    Try to incorporate more vegetables in your diet

## 2024-06-11 NOTE — TELEPHONE ENCOUNTER
Home Health Care    Reason for call:  Requesting verbal orders     Orders are needed for this patient: 1 PRN visit for wound care on Friday, 6/14/24     Pt Provider: Dr. Pulliam     Phone Number Homecare Nurse can be reached at: [phone], it is okay to leave detailed VM on this phone        Rae Grijalva, MSN, RN   [facility]

## 2024-06-11 NOTE — NURSING NOTE
"Chief Complaint   Patient presents with    Osteoporosis     Vital signs:      BP: (!) 162/91 Pulse: 75     SpO2: 99 %     Height:  (Scooter) Weight:  (Scooter)  Estimated body mass index is 30.53 kg/m  as calculated from the following:    Height as of 4/5/24: 1.727 m (5' 8\").    Weight as of 4/8/24: 91.1 kg (200 lb 12.8 oz).        "

## 2024-06-12 ENCOUNTER — TELEPHONE (OUTPATIENT)
Dept: ENDOCRINOLOGY | Facility: CLINIC | Age: 69
End: 2024-06-12
Payer: MEDICARE

## 2024-06-12 ENCOUNTER — MEDICAL CORRESPONDENCE (OUTPATIENT)
Dept: HEALTH INFORMATION MANAGEMENT | Facility: CLINIC | Age: 69
End: 2024-06-12
Payer: MEDICARE

## 2024-06-12 DIAGNOSIS — M81.0 OSTEOPOROSIS OF MULTIPLE SITES: Primary | ICD-10-CM

## 2024-06-12 LAB — ALBUMIN SERPL BCG-MCNC: 4.1 G/DL (ref 3.5–5.2)

## 2024-06-13 RX ORDER — AMLODIPINE BESYLATE 5 MG/1
5 TABLET ORAL DAILY
Qty: 30 TABLET | Refills: 0 | Status: SHIPPED | OUTPATIENT
Start: 2024-06-13 | End: 2024-07-09

## 2024-06-13 NOTE — TELEPHONE ENCOUNTER
Relayed POC to patient who verbalizes understanding. States she/spouse will  medication later today.    Patient preference is a reported BP by either Chemi, home care RN (if she is still seeing the pt for wound cares) or pt reported home BP. Will postpone encounter to come back to RN pool in the event we have not heard a reported BP week of 7/1.    SAEID CarnesN, RN (she/her)  [facility] RN

## 2024-06-13 NOTE — TELEPHONE ENCOUNTER
Okay, lets try adding low-dose amlodipine.    I sent 1 month prescription to her pharmacy    Can you make a nurse only blood pressure follow-up appointment for 3 weeks?  Alternatively, you could just make a note to call home nurse in 3 weeks to check on home blood pressures.  That would actually be much easier for this wheelchair-bound patient.    Dr. Carolina Pulliam MD / [facility]

## 2024-06-15 ENCOUNTER — TELEPHONE (OUTPATIENT)
Dept: ENDOCRINOLOGY | Facility: CLINIC | Age: 69
End: 2024-06-15
Payer: MEDICARE

## 2024-06-15 DIAGNOSIS — E89.0 POSTABLATIVE HYPOTHYROIDISM: Primary | ICD-10-CM

## 2024-06-15 LAB
DEPRECATED CALCIDIOL+CALCIFEROL SERPL-MC: <57 UG/L (ref 20–75)
VITAMIN D2 SERPL-MCNC: <5 UG/L
VITAMIN D3 SERPL-MCNC: 52 UG/L

## 2024-06-15 RX ORDER — LEVOTHYROXINE SODIUM 112 UG/1
112 TABLET ORAL DAILY
Qty: 90 TABLET | Refills: 3 | Status: SHIPPED | OUTPATIENT
Start: 2024-06-15

## 2024-06-15 NOTE — TELEPHONE ENCOUNTER
Increase LT4 to 112 mcg daily    Estela Flowers MD  Staff Physician  Division of Endocrinology  [facility]  Pager #[phone]

## 2024-06-17 DIAGNOSIS — B00.9 HSV (HERPES SIMPLEX VIRUS) INFECTION: ICD-10-CM

## 2024-06-17 RX ORDER — ACYCLOVIR 400 MG/1
400 TABLET ORAL EVERY 12 HOURS
Qty: 180 TABLET | Refills: 2 | Status: SHIPPED | OUTPATIENT
Start: 2024-06-17

## 2024-06-25 ENCOUNTER — TELEPHONE (OUTPATIENT)
Dept: FAMILY MEDICINE | Facility: CLINIC | Age: 69
End: 2024-06-25

## 2024-06-25 ENCOUNTER — LAB (OUTPATIENT)
Dept: LAB | Facility: CLINIC | Age: 69
End: 2024-06-25
Payer: MEDICARE

## 2024-06-25 LAB
ALBUMIN UR-MCNC: NEGATIVE MG/DL
APPEARANCE UR: CLEAR
BACTERIA #/AREA URNS HPF: ABNORMAL /HPF
BILIRUB UR QL STRIP: NEGATIVE
COLOR UR AUTO: YELLOW
GLUCOSE UR STRIP-MCNC: NEGATIVE MG/DL
HGB UR QL STRIP: ABNORMAL
KETONES UR STRIP-MCNC: NEGATIVE MG/DL
LEUKOCYTE ESTERASE UR QL STRIP: ABNORMAL
MUCOUS THREADS #/AREA URNS LPF: PRESENT /LPF
NITRATE UR QL: NEGATIVE
PH UR STRIP: 5.5 [PH] (ref 5–7)
RBC #/AREA URNS AUTO: ABNORMAL /HPF
SP GR UR STRIP: 1.01 (ref 1–1.03)
SQUAMOUS #/AREA URNS AUTO: ABNORMAL /LPF
UROBILINOGEN UR STRIP-ACNC: 0.2 E.U./DL
WBC #/AREA URNS AUTO: ABNORMAL /HPF
WBC CLUMPS #/AREA URNS HPF: PRESENT /HPF
YEAST #/AREA URNS HPF: ABNORMAL /HPF

## 2024-06-25 PROCEDURE — 87086 URINE CULTURE/COLONY COUNT: CPT

## 2024-06-25 PROCEDURE — 81001 URINALYSIS AUTO W/SCOPE: CPT

## 2024-06-25 NOTE — TELEPHONE ENCOUNTER
Forms/Letter Request    Type of form/letter: Home Health Certification      Do we have the form/letter: Yes: Form folder CARE TEAM 4    Who is the form from? Home care    Where did/will the form come from? form was faxed in    When is form/letter needed by: N/A    How would you like the form/letter returned: Fax : Number listed in contact

## 2024-06-26 ENCOUNTER — TELEPHONE (OUTPATIENT)
Dept: FAMILY MEDICINE | Facility: CLINIC | Age: 69
End: 2024-06-26
Payer: MEDICARE

## 2024-06-26 LAB — BACTERIA UR CULT: NORMAL

## 2024-06-26 NOTE — TELEPHONE ENCOUNTER
Dr. Pulliam-Please review and may close encounter.  BP and pulse entered into Pt reported vitals.    Call received from HEIDY Dyson, with [facility]:  Patient relayed to RN her blood pressure medication was changed  2. Today's BP readin/79, pulse: 70    Thank you!  SAEID LowN, RN-[facility]

## 2024-06-26 NOTE — TELEPHONE ENCOUNTER
Message reviewed and information noted.  No further action required at this time.  Thank you,  Dr. Carolina Pulliam MD/[facility]

## 2024-06-29 DIAGNOSIS — I10 HYPERTENSION GOAL BP (BLOOD PRESSURE) < 140/90: ICD-10-CM

## 2024-06-30 ENCOUNTER — TELEPHONE (OUTPATIENT)
Dept: NURSING | Facility: CLINIC | Age: 69
End: 2024-06-30
Payer: MEDICARE

## 2024-06-30 DIAGNOSIS — B37.2 YEAST INFECTION OF THE SKIN: Primary | ICD-10-CM

## 2024-07-01 RX ORDER — AMLODIPINE BESYLATE 5 MG/1
5 TABLET ORAL DAILY
Qty: 30 TABLET | Refills: 0 | OUTPATIENT
Start: 2024-07-01

## 2024-07-01 NOTE — TELEPHONE ENCOUNTER
Home care RN calling, to send a message to Dr. Pulliam needing an order for over-the-counter fungal cream or fungal powder for patient's sacral wound.     Routed to Dr. KIRIT Pulliam per request. Please return the call at [phone], voice mail is secure.     Jaleesa Mercedes RN  [facility]  June 30, 2024, 8:10 PM

## 2024-07-02 ENCOUNTER — TELEPHONE (OUTPATIENT)
Dept: WOUND CARE | Facility: CLINIC | Age: 69
End: 2024-07-02
Payer: MEDICARE

## 2024-07-02 RX ORDER — MICONAZOLE NITRATE 20 MG/G
CREAM TOPICAL 2 TIMES DAILY
Status: SHIPPED
Start: 2024-07-02 | End: 2024-09-24

## 2024-07-02 NOTE — TELEPHONE ENCOUNTER
Fermin is calling back.  She is using this OTC and its working.  Less redness.  Can she get the order?    OK to leave a detailed message.  HEIDY Stock

## 2024-07-02 NOTE — TELEPHONE ENCOUNTER
Returned call to Manuel. He reports the patient does not have a pressure ulcer and has redness on the sacrum/perineum from moisture. Discussed with Manuel that DARLENE is not able to see without an open and draining wound. Manuel will reach out to the patients PCP for more assistance. No further questions or concerns.

## 2024-07-02 NOTE — TELEPHONE ENCOUNTER
"  Earlier TE stated-\" order for over-the-counter fungal cream or fungal powder for patient's sacral wound. \"    I called Fermin home care nurse back.  She will have to ask the pt what the OTC cream is.      She will call back to list the OTC cream name-----------.  Please document and send to Dr. Pulliam.      Dayami Nogueira, RN-[facility]           "

## 2024-07-02 NOTE — TELEPHONE ENCOUNTER
Dr. Pulliam-Please review and advise if agree to requested order for applying the following medicated cream to patient's sacral wound?    HEIDY Christensen with [facility] called back and states the name of over-the-counter medication is Miconazole nitrate 2% antifungal cream.    Thank you!  SAEID LowN, RN-[facility]

## 2024-07-02 NOTE — TELEPHONE ENCOUNTER
Yes, thank you!    I ordered the medicine if needed and it's okay for home care to use.      Dr. Carolina Pulliam MD / [facility]

## 2024-07-02 NOTE — TELEPHONE ENCOUNTER
Relayed to patient who verbalizes understanding and will communicate to home care RN at next visit.    SAEID CarnesN, RN (she/her)  [facility] RN

## 2024-07-03 NOTE — TELEPHONE ENCOUNTER
Relayed approval to [facility] home care RN as well.    BABATUNDE Logan BSN, RN (she/her)  [facility] RN

## 2024-07-08 ENCOUNTER — TELEPHONE (OUTPATIENT)
Dept: FAMILY MEDICINE | Facility: CLINIC | Age: 69
End: 2024-07-08
Payer: MEDICARE

## 2024-07-08 DIAGNOSIS — G35 MS (MULTIPLE SCLEROSIS) (H): Primary | ICD-10-CM

## 2024-07-08 NOTE — TELEPHONE ENCOUNTER
Forms/Letter Request    Type of form/letter: DME (wheelchair, hospital bed)    Type of DME requested: Bed/ Hosp bed semi-electr w/ freedom    Do we have the form/letter: Yes: placed in care team 3 providers form folder    Who is the form from? [employer] (if other please explain)    Where did/will the form come from? form was faxed in    When is form/letter needed by: n/a    How would you like the form/letter returned: Fax : [phone]

## 2024-07-09 ENCOUNTER — TELEPHONE (OUTPATIENT)
Dept: FAMILY MEDICINE | Facility: CLINIC | Age: 69
End: 2024-07-09
Payer: MEDICARE

## 2024-07-09 DIAGNOSIS — I10 HYPERTENSION GOAL BP (BLOOD PRESSURE) < 140/90: ICD-10-CM

## 2024-07-09 RX ORDER — AMLODIPINE BESYLATE 5 MG/1
5 TABLET ORAL DAILY
Qty: 90 TABLET | Refills: 1 | Status: SHIPPED | OUTPATIENT
Start: 2024-07-09

## 2024-07-09 NOTE — TELEPHONE ENCOUNTER
FYI - Status Update    Who is Calling: family member, spouse Manuel    Update: blood pressure reading for today 123/69    Does caller want a call/response back: No

## 2024-07-09 NOTE — TELEPHONE ENCOUNTER
I didn't order a hospital bed.  Does she need a new one or is this a fake request from a DME company?    I placed care coordiation referral to help figure this out.    Dr. Carolina Pulliam MD / [employer]

## 2024-07-10 ENCOUNTER — MEDICAL CORRESPONDENCE (OUTPATIENT)
Dept: HEALTH INFORMATION MANAGEMENT | Facility: CLINIC | Age: 69
End: 2024-07-10
Payer: MEDICARE

## 2024-07-11 ENCOUNTER — PATIENT OUTREACH (OUTPATIENT)
Dept: CARE COORDINATION | Facility: CLINIC | Age: 69
End: 2024-07-11
Payer: MEDICARE

## 2024-07-11 ENCOUNTER — TELEPHONE (OUTPATIENT)
Dept: FAMILY MEDICINE | Facility: CLINIC | Age: 69
End: 2024-07-11
Payer: MEDICARE

## 2024-07-11 ENCOUNTER — MEDICAL CORRESPONDENCE (OUTPATIENT)
Dept: HEALTH INFORMATION MANAGEMENT | Facility: CLINIC | Age: 69
End: 2024-07-11
Payer: MEDICARE

## 2024-07-11 NOTE — TELEPHONE ENCOUNTER
Dr. Pulliam - [facility] requesting 1x order for wound care RN to visit and assess patient today. [facility] nurse already en route so writer gave verbal approval and routing to clinician for formal review and agreement.    SAEID CarnesN, RN (she/her)  [facility] RN

## 2024-07-11 NOTE — PROGRESS NOTES
Clinic Care Coordination Contact  [facility]/Voicemail    Clinical Data: Care Coordinator Outreach    Outreach Documentation Number of Outreach Attempt   7/11/2024   3:40 PM 1       Left message on patient's voicemail with call back information and requested return call.    Plan: Care Coordinator will try to reach patient again in 1-2 business days.    Sarah Fajardo CHW, B.A. [facility] Care Coordination  [facility]:   [facility]  [phone]

## 2024-07-11 NOTE — LETTER
[facility] CARE COORDINATION  [address]  [place] [address]    July 12, 2024    Marylee Woods  [address]  [facility] [place] [address]      Dear Marylee,    I am a clinic care coordinator who works with Carolina Pulliam MD with the [facility]. I wanted to introduce myself and provide you with my contact information for you to be able to call me with any questions or concerns. Below is a description of clinic care coordination and how I can further assist you.       The clinic care coordination team is made up of a registered nurse, , financial resource worker and community health worker who understand the health care system. The goal of clinic care coordination is to help you manage your health and improve access to the health care system. Our team works alongside your provider to assist you in determining your health and social needs. We can help you obtain health care and community resources, providing you with necessary information and education. We can work with you through any barriers and develop a care plan that helps coordinate and strengthen the communication between you and your care team.  Our services are voluntary and are offered without charge to you personally.    Please feel free to contact me with any questions or concerns regarding care coordination and what we can offer.      We are focused on providing you with the highest-quality healthcare experience possible.    Sincerely,     Sarah Fajardo, CHW, B.A. [facility] Care Coordination  [facility]:   [facility]  [phone]

## 2024-07-12 ENCOUNTER — TELEPHONE (OUTPATIENT)
Dept: FAMILY MEDICINE | Facility: CLINIC | Age: 69
End: 2024-07-12
Payer: MEDICARE

## 2024-07-12 DIAGNOSIS — G35 MS (MULTIPLE SCLEROSIS) (H): Primary | ICD-10-CM

## 2024-07-12 NOTE — TELEPHONE ENCOUNTER
Forms/Letter Request    Type of form/letter: DME (wheelchair, hospital bed)    Type of DME requested: Hosp Bed Semi-Electr w/freedom      Do we have the form/letter: Yes: placed in care team 3 providers form folder    Who is the form from? [employer] (if other please explain)    Where did/will the form come from? form was faxed in    When is form/letter needed by: n/a    How would you like the form/letter returned: Fax : [phone]

## 2024-07-12 NOTE — TELEPHONE ENCOUNTER
Notified Chimee that wound care visit was approved.  SAEID AnglinN, PHN, RN  [facility]  [phone]

## 2024-07-12 NOTE — PROGRESS NOTES
Clinic Care Coordination Contact  [facility]/Voicemail    Clinical Data: Care Coordinator Outreach    Outreach Documentation Number of Outreach Attempt   7/11/2024   3:40 PM 1   7/12/2024   2:43 PM 2       Left message on patient's voicemail with call back information and requested return call.    Plan: Care Coordinator will send care coordination introduction letter with care coordinator contact information and explanation of care coordination services via [employer]. Care Coordinator will do no further outreaches at this time.    Sarah Fajardo, CHW, B.A. Critical [facility] hospital Care Coordination  [facility]:   [facility]  [phone]

## 2024-07-12 NOTE — TELEPHONE ENCOUNTER
Home Care is calling regarding an established patient with [facility].       Requesting orders from: Carolina Pulliam  Provider is following patient: Yes  Is this a 60-day recertification request?  No      Home care will end on 7/17. Nothing else they can do at home.    Orders Requested  Outpatient Physical therapy to [facility]    Phone: [phone]  Fax: [phone]    [facility]. [phone]    Confirmed ok to leave a detailed message with call back.  Contact information confirmed and updated as needed.    Cleve Lester, RN

## 2024-07-15 ENCOUNTER — TELEPHONE (OUTPATIENT)
Dept: ENDOCRINOLOGY | Facility: CLINIC | Age: 69
End: 2024-07-15
Payer: MEDICARE

## 2024-07-15 NOTE — TELEPHONE ENCOUNTER
,    Please call pt and ask if there is urgency here or ok to wait for Dr Pulliam .    Thank you,  Mabel Bowman RN  [facility]

## 2024-07-15 NOTE — TELEPHONE ENCOUNTER
[facility]    Phone Message    May a detailed message be left on voicemail: yes     Reason for Call: Other: Patient calling to discuss her last visit on 6/11/2024, she was then told to start evenity injections but was not given a date or instructions. Patient wants to discuss her other medication as well. Please call thank you.      Action Taken: Message routed to:  [facility] endo    Travel Screening: Not Applicable     Date of Service:

## 2024-07-18 DIAGNOSIS — K21.9 GASTROESOPHAGEAL REFLUX DISEASE WITHOUT ESOPHAGITIS: ICD-10-CM

## 2024-07-19 ENCOUNTER — TRANSFERRED RECORDS (OUTPATIENT)
Dept: HEALTH INFORMATION MANAGEMENT | Facility: CLINIC | Age: 69
End: 2024-07-19
Payer: MEDICARE

## 2024-07-19 RX ORDER — FAMOTIDINE 40 MG/1
40 TABLET, FILM COATED ORAL AT BEDTIME
Qty: 90 TABLET | Refills: 2 | Status: SHIPPED | OUTPATIENT
Start: 2024-07-19

## 2024-07-20 ENCOUNTER — MEDICAL CORRESPONDENCE (OUTPATIENT)
Dept: FAMILY MEDICINE | Facility: CLINIC | Age: 69
End: 2024-07-20
Payer: MEDICARE

## 2024-07-22 ENCOUNTER — MEDICAL CORRESPONDENCE (OUTPATIENT)
Dept: HEALTH INFORMATION MANAGEMENT | Facility: CLINIC | Age: 69
End: 2024-07-22
Payer: MEDICARE

## 2024-07-23 ENCOUNTER — TRANSFERRED RECORDS (OUTPATIENT)
Dept: HEALTH INFORMATION MANAGEMENT | Facility: CLINIC | Age: 69
End: 2024-07-23
Payer: MEDICARE

## 2024-07-25 ENCOUNTER — TRANSFERRED RECORDS (OUTPATIENT)
Dept: HEALTH INFORMATION MANAGEMENT | Facility: CLINIC | Age: 69
End: 2024-07-25
Payer: MEDICARE

## 2024-07-29 ENCOUNTER — TELEPHONE (OUTPATIENT)
Dept: ENDOCRINOLOGY | Facility: CLINIC | Age: 69
End: 2024-07-29
Payer: MEDICARE

## 2024-07-29 NOTE — TELEPHONE ENCOUNTER
Hi, does your team have any insight on if this form should be completed? The last message on here was this was addressed in 7/9 CC TE. Let me know if we should shred physical copy on site thanks!

## 2024-07-30 NOTE — TELEPHONE ENCOUNTER
Edit plan    Estela Flowers MD  Staff Physician  Division of Endocrinology  [facility]  Pager #[phone]

## 2024-08-12 ENCOUNTER — TELEPHONE (OUTPATIENT)
Dept: FAMILY MEDICINE | Facility: CLINIC | Age: 69
End: 2024-08-12
Payer: MEDICARE

## 2024-08-12 NOTE — TELEPHONE ENCOUNTER
Forms/Letter Request    Type of form/letter: DME (wheelchair, hospital bed)    Type of DME requested: Wheelchair backseat    Do we have the form/letter: Yes: Placed in care team 3    Who is the form from? [employer]    Where did/will the form come from? form was faxed in    When is form/letter needed by: as soon as able    How would you like the form/letter returned: Fax : [phone]

## 2024-08-13 NOTE — PROGRESS NOTES
[facility] Medical Oncology Note  Date of visit: August 15, 2024  Outpatient Clinic Note    Assessment:     Subcm DCIS status postlumpectomy, in a 66-year-old woman who is on medical disability because of progressive multiple sclerosis.  She is in a wheelchair at baseline.  She has incontinence.  She can ambulate 20-25 steps with the assistance of a walker.  But we have to be careful about any therapy worsening her overall quality of life.  She continues to have multiple hospitalizations yearly due to complications from the MS.  We treat ductal carcinoma in situ to prevent the development of invasive disease.  Adjuvant radiotherapy is standard in this setting.  She received this with only adequate tolerance. In fact, she had significant fatigue months after completing the radiotherapy.  This colors her approach to any other therapies such as endocrine treatment.  In terms of systemic therapy, both tamoxifen and aromatase inhibitors have been studied in this setting and have never been shown to affect survival.  They can decrease the relative risk of invasive cancer, but the absolute benefit is in the low to mid single digits.  There is equivalence of 5 mg of tamoxifen for 3 years as compared to 20 mg for 5 years.  But clearly there is no impact on survival.  Marylee wants us to focus on her quality of life.  Therefore in my opinion, the benefit of adjuvant endocrine treatment in this setting is far outweighed by any chance of worsening her tenuous quality of life right now.  Now, 2-1/2 years from the time of her diagnosis, with no obvious evidence of recurrent disease by history or physical exam..  I had a long and involved conversation with Malorie Almendarez. Many questions were asked and hopefully answered to her satisfaction.    Plan:       Return to clinic in 8 months for routine follow-up, with the next annual mammogram.    Kenny Martinez MD, MSc  Associate Professor of Medicine  [facility]  [facility] [employer]  [facility]  [address] [place] [address]  [phone]    __________________________________________________________________    CHIEF COMPLAINT     Sub centimeter grade 2 DCIS, diagnosed at left breast lumpectomy 3/31/2022. Final pathology showed a 7 mm area of grade 2 ductal carcinoma in situ.  There was no evidence of invasion in the specimen.  Margins were uninvolved with the closest margin being 4 mm away.  The tumor was estrogen receptor positive.  Multiple Sclerosis, for which she is severely disabled. She spends most of the day in a wheelchair.  She has urinary incontinence due to a neurogenic bladder.  She does do physical therapy once a week.  With assistance from her  Manuel, she can ambulate about 20 feet with a walker.  She then gets tired.  She has issues with spasmatic muscles and is taking antispasmodics.  She has not had any specific drugs for her multiple sclerosis since 2017.  Things have been pretty stable.      History of Present Illness/THERAPY TO DATE:     Left lumpectomy 3/31/2022.  Adjuvant RT from 6/1-6/5/2022. (2600 cGy in 5 fractions). This was tolerated very poorly.  Observation in the interim. Endocrine therapy was discussed but the risk/benefit ratio was felt to be not worth prescribing.    Interval History:     Marylee is for a yearly visit, she states she has been feeling well overall physically in the past year  Saw Dr. Mercer of urology, who recommended topical estorgen for atrophic vaginitis.  Hospitalized 4/5-9/2024 with acute hypoxic respiratory failure, coincident E. coli UTI, sacral decubitus ulcer, and a type II non-ST elevated myocardial infarction.  Prior to that, she sustained a right distal femur fracture 11/2023.  Doing pretty well these days.  Has no cough or shortness of breath.  Saw the musical written by an overrated oncologist and brings the program for an autograph.  Wants to know if she should get another mammogram  (she should)  No new onset fatigue, no abdominal pain, no back or bone pain   Her and her  monitor closely for pressure sores out  Overall feels comfortable with any adjuvant systemic therapy.        Past Medical History:   I have reviewed this patient's past medical history   Past Medical History:   Diagnosis Date    Acute cystitis without hematuria     Acute pain of right knee     Atypical ductal hyperplasia of left breast 02/2022    Candida esophagitis - 2019 (H)     CKD (chronic kidney disease) stage 3, GFR 30-59 ml/min (H)     Community acquired pneumonia, unspecified laterality     Cough, unspecified type 04/05/2024    Depression     Epigastric pain     Former tobacco use     Fracture of femur, distal, left, closed (H)     Gastro-oesophageal reflux disease     Graves disease     History of fracture of fibula     History of tibial fracture     HSV (herpes simplex virus) infection     Hyperlipidemia with target LDL less than 130     Hyponatremia     L tib fx s/p IM nailing     Lung nodules     Currently managed with follow up imaging by [facility] result Team.       Multiple sclerosis (H)     Osteoporosis     Postablative hypothyroidism           Past Surgical History:    I have reviewed this patient's past surgical history       Social History:   Tobacco, ETOH, and rec drugs reviewed and as noted below with the following exceptions:  Malorie Almendarez grew up in [place] and graduated from [employer] in 1973.  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  After that she was in the  of another [employer] and she really started thinking that she wanted to do something else with her life.  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  She really liked the work.  She ultimately had to retire because of progression of her multiple sclerosis.  Her  is Manuel whom she met outside of the [employer].  They  [Normal Appearance] : normal appearance [General Appearance - In No Acute Distress] : no acute distress [place].  They  in 1985.  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  They have 3 children, Marleni, Joss, and Janeth with one 1-year-old grandson named Sami who lives in [place].  Her bucket list included seeing the [place] in [place] (done) going to [employer] (done).  She is planning next week to go to [employer] and spent a lot of time in the [employer] themed areas since she is a big fan of those books.  She is a [employer]          Family History:     Family History   Problem Relation Age of Onset    Heart Disease Maternal Grandmother     Cancer Maternal Grandfather     Heart Disease Paternal Grandfather     Heart Disease Mother     Heart Disease Father     Diabetes No family hx of     Coronary Artery Disease No family hx of     Hypertension No family hx of     Hyperlipidemia No family hx of     Cerebrovascular Disease No family hx of     Breast Cancer No family hx of     Colon Cancer No family hx of     Prostate Cancer No family hx of     Other Cancer No family hx of     Depression No family hx of     Anxiety Disorder No family hx of     Mental Illness No family hx of     Substance Abuse No family hx of     Anesthesia Reaction No family hx of     Asthma No family hx of     Osteoporosis No family hx of     Genetic Disorder No family hx of     Thyroid Disease No family hx of     Obesity No family hx of     Unknown/Adopted No family hx of             Medications:     Current Outpatient Medications   Medication Sig Dispense Refill    acetaminophen (TYLENOL) 500 MG tablet Take 1-2 tablets (500-1,000 mg) by mouth every 8 hours as needed for mild pain or fever (greater than 101 degrees)      acyclovir (ZOVIRAX) 400 MG tablet TAKE 1 TABLET BY MOUTH EVERY 12 HOURS 180 tablet 2    amLODIPine (NORVASC) 5 MG tablet Take 1 tablet (5 mg) by mouth daily 90 tablet 1    Armodafinil 200 MG TABS Take 200 mg by mouth every morning      baclofen (LIORESAL) 10 MG  [Normal Conjunctiva] : the conjunctiva exhibited no abnormalities tablet Take 40 mg by mouth at bedtime In addition to morning and noon doses      baclofen (LIORESAL) 10 MG tablet Take 10 mg by mouth 2 times daily 0800 and noon (in addition to bedtime dose)      DULoxetine (CYMBALTA) 20 MG capsule TAKE 1 CAPSULE BY MOUTH ONCE DAILY ALONG  WITH  A  60  MG  TABLET  FOR  A  TOTAL  DOSE  OF  80  MG  DAILY 90 capsule 1    DULoxetine (CYMBALTA) 60 MG capsule Take 1 capsule by mouth once daily 90 capsule 3    famotidine (PEPCID) 40 MG tablet TAKE 1 TABLET BY MOUTH AT BEDTIME 90 tablet 2    gabapentin (NEURONTIN) 300 MG capsule Take 3 capsules (900 mg) by mouth 2 times daily      latanoprost (XALATAN) 0.005 % ophthalmic solution INSTILL 1 DROP INTO BOTH EYES EVERY DAY AS DIRECTED PT WILL NEED TO BE SEEN FOR FUTURE REFILLS      levothyroxine (SYNTHROID/LEVOTHROID) 112 MCG tablet Take 1 tablet (112 mcg) by mouth daily 90 tablet 3    losartan (COZAAR) 50 MG tablet Take 1 tablet (50 mg) by mouth 2 times daily 180 tablet 4    miconazole (MICATIN) 2 % external cream Apply topically 2 times daily      nitroFURantoin macrocrystal-monohydrate (MACROBID) 100 MG capsule Take 1 capsule (100 mg) by mouth daily 90 capsule 4    oxyCODONE (ROXICODONE) 5 MG tablet Take 0.5 tablets (2.5 mg) by mouth every 6 hours as needed for moderate pain 15 tablet 0    polyethylene glycol (MIRALAX) 17 GM/Dose powder Take 17 g by mouth daily as needed for constipation                Physical Exam:   Last menstrual period 01/31/2004, not currently breastfeeding.      Constitutional: WDWN female in NAD, pleasant and appropriate.  She has slight slurred speech and was examined in her wheelchair today.  Head: Full head of hair  Sclera: Anicteric  Neck: Supple, free range of motion, no obvious jugular venous distention   Respirations: Unforced respiratory effort. Clear to auscultation bilaterally  Cardiovascular: S1-S2 regular rate and rhythm  GI: Nontender   Extremities: She has bilateral braces.  No obvious edema proximal  [Normal Oral Mucosa] : normal oral mucosa of the knee  Neurologic: alert, answering questions appropriately, with some slurred speech.  She does not move her lower extremities since she is in the wheelchair.  Psych: appropriate affect  Breast exam: The left breast is status post lumpectomy and there is a well-healed periareolar scar without any obvious abnormality.  There is minimal skin thickening and hyperpigmentation from the prior radiation.  There is no discrete palpable mass.  The left axilla is negative.  The right breast has no significant palpable abnormality in the right axilla is negative.  Data:     Mammo/US 4/2024  Examinations: MA DIAGNOSTIC LEFT W/ KIM, LEFT breast ultrasound  4/16/2024 11:04 AM     Comparisons: 8/17/2023, 3/28/2022, 2/11/2022     History: Left breast pain for about a month. History of left breast  DCIS, status post lumpectomy.     BREAST DENSITY: The breasts are heterogeneously dense which may  obscure small masses.     Exam is limited due to patient positioning as the patient is in a  wheelchair.     Findings:     Mammogram:  Posttreatment changes in the left breast. No suspicious findings in  the left breast or significant change compared to prior.     Ultrasound:  Targeted left breast ultrasound evaluation was performed by the  technologist and radiologist. In the area of pain at the o'clock  position, 4 cm from the nipple, there is only normal-appearing breast  tissue. No suspicious findings.                                                                      IMPRESSION: BI-RADS CATEGORY: 2 - Benign.     RECOMMENDED FOLLOW-UP: Routine yearly mammography beginning at age 40  or as discussed with your provider.     Given lack of imaging findings in the left breast, management of pain  would need to be based on clinical grounds.      Recent Labs   Lab Test 07/02/21  0727 07/01/21  0713 06/30/21  1717 06/10/21  0904 07/03/20  1525 11/27/18  0933 11/15/18  0711 11/12/18  0622   WBC 9.8 10.4 8.1 5.7 3.6* 3.2*  --  3.2*    NEUTROPHIL 83.8 84.8 77.5  --   --  78.0  --  76.7   HGB 12.6 12.2 14.7 12.2 9.2* 9.6*  --  7.4*    149* 194 177 181 179   < > 204    < > = values in this interval not displayed.     Recent Labs   Lab Test 02/24/22  0830 08/12/21  1033 07/09/21  1538 07/02/21 0727 07/01/21 0713 06/30/21 1717   NA  --  137 136 139 139 136   POTASSIUM  --  3.8 4.5 3.9  3.9 4.4 2.9*   CHLORIDE  --  102 102 104 109 98   CO2  --  34* 34* 25 23 32   ANIONGAP  --  1* <1* 9 7 6   BUN  --  15 9 25 12 13   CR 1.04 1.08* 0.98 0.96 0.88 0.97   JORDON 9.2 9.5 9.5 8.8 8.3* 9.6     Recent Labs   Lab Test 08/12/21  1033 07/01/21  0713 06/30/21  1717 11/02/18  0521 10/03/17  1449   MAG  --  1.8 1.9 1.8  --    PHOS 3.4  --  3.8  --   --    LDH  --   --   --   --  181     Recent Labs   Lab Test 08/12/21  1033 07/02/21  0727 07/01/21  0713 06/30/21 1717 11/01/17  0047 10/03/17  1449   BILITOTAL  --  0.7 0.8 0.7   < > 0.3   ALKPHOS  --  64 58 72   < > 78   ALT  --  19 16 21   < > 21   AST  --  30 33 26   < > 23   ALBUMIN 3.6 3.4 3.0* 4.0   < > 3.9   LDH  --   --   --   --   --  181    < > = values in this interval not displayed.       No results found for this or any previous visit (from the past 24 hour(s)).    Other Data     LEFT breast, 3:00, RFID seed localized lumpectomy:  -Ductal carcinoma in situ (DCIS), nuclear grade 2, size 7 mm, arising in a sclerosed intraductal papilloma  -No invasive carcinoma identified  -Margins are uninvolved by DCIS  -DCIS is 4 mm from the closest (posterior) margin, 5 mm from the lateral margin, and is > 5 mm from the  anterior, superior, inferior, and medial margins  -Other findings: fibrocystic change (including microcysts) and usual ductal hyperplasia  -Calcifications associated with sclerotic stroma and with benign ducts and acini  -Prior core biopsy site change  -DCIS is estrogen receptor positive and progesterone receptor negative by immunohistochemistry (see  biomarker reporting template below)  -See  [Normal Jugular Venous V Waves Present] : normal jugular venous V waves present [Respiration, Rhythm And Depth] : normal respiratory rhythm and effort tumor synoptic below  Electronically signed by Juana Palomares MD on 4/7/2022 at 10:45 PM  .  Synoptic Checklist  DCIS OF THE BREAST: Resection (DCIS OF THE BREAST: COMPLETE EXCISION - All Specimens) 8th Edition -  Protocol posted: 2/26/2020  SPECIMEN  Procedure Excision (less than total mastectomy)  Specimen Laterality Left  .      Labs, imaging and treatment plan reviewed with patient. All questions answered.        30 minutes spent on the date of the encounter doing chart review, review of outside records, review of test results, interpretation of tests, patient visit, documentation and discussion with family        [Auscultation Breath Sounds / Voice Sounds] : lungs were clear to auscultation bilaterally [Heart Sounds] : normal S1 and S2 [Arterial Pulses Normal] : the arterial pulses were normal [Edema] : no peripheral edema present [Irregularly Irregular] : the rhythm was irregularly irregular [Bowel Sounds] : normal bowel sounds [Abdomen Soft] : soft [Abnormal Walk] : normal gait [Cyanosis, Localized] : no localized cyanosis [Petechial Hemorrhages (___cm)] : no petechial hemorrhages [] : no ischemic changes [Skin Turgor] : normal skin turgor [Oriented To Time, Place, And Person] : oriented to person, place, and time [Impaired Insight] : insight and judgment were intact [Affect] : the affect was normal

## 2024-08-14 ENCOUNTER — TELEPHONE (OUTPATIENT)
Dept: FAMILY MEDICINE | Facility: CLINIC | Age: 69
End: 2024-08-14
Payer: MEDICARE

## 2024-08-14 ENCOUNTER — MEDICAL CORRESPONDENCE (OUTPATIENT)
Dept: HEALTH INFORMATION MANAGEMENT | Facility: CLINIC | Age: 69
End: 2024-08-14
Payer: MEDICARE

## 2024-08-14 NOTE — TELEPHONE ENCOUNTER
Forms/Letter Request    Type of form/letter: DME (wheelchair, hospital bed)    Type of DME requested: Hosp bed semi-electr w/ freedom    Do we have the form/letter: Yes: placed in care team 3 providers sign folder    Who is the form from? [facility] (if other please explain)    Where did/will the form come from? form was faxed in    When is form/letter needed by: n/a    How would you like the form/letter returned: Fax : [phone]

## 2024-08-15 ENCOUNTER — TRANSFERRED RECORDS (OUTPATIENT)
Dept: HEALTH INFORMATION MANAGEMENT | Facility: CLINIC | Age: 69
End: 2024-08-15

## 2024-08-15 ENCOUNTER — ONCOLOGY VISIT (OUTPATIENT)
Dept: ONCOLOGY | Facility: CLINIC | Age: 69
End: 2024-08-15
Attending: INTERNAL MEDICINE
Payer: MEDICARE

## 2024-08-15 VITALS
OXYGEN SATURATION: 100 % | DIASTOLIC BLOOD PRESSURE: 76 MMHG | SYSTOLIC BLOOD PRESSURE: 137 MMHG | HEART RATE: 85 BPM | TEMPERATURE: 98.2 F | RESPIRATION RATE: 16 BRPM

## 2024-08-15 DIAGNOSIS — Z12.31 BREAST CANCER SCREENING BY MAMMOGRAM: ICD-10-CM

## 2024-08-15 DIAGNOSIS — D05.12 DUCTAL CARCINOMA IN SITU (DCIS) OF LEFT BREAST: Primary | ICD-10-CM

## 2024-08-15 PROCEDURE — 99214 OFFICE O/P EST MOD 30 MIN: CPT | Performed by: INTERNAL MEDICINE

## 2024-08-15 PROCEDURE — G0463 HOSPITAL OUTPT CLINIC VISIT: HCPCS | Performed by: INTERNAL MEDICINE

## 2024-08-15 RX ORDER — ESTRADIOL 10 UG/1
10 TABLET VAGINAL
COMMUNITY

## 2024-08-15 ASSESSMENT — PAIN SCALES - GENERAL: PAINLEVEL: NO PAIN (0)

## 2024-08-15 NOTE — LETTER
8/15/2024      Marylee Woods  [address]  [facility] [address]      Dear Colleague,    Thank you for referring your patient, Marylee Woods, to the [facility]. Please see a copy of my visit note below.              [facility] Medical Oncology Note  Date of visit: August 15, 2024  Outpatient Clinic Note    Assessment:     Subcm DCIS status postlumpectomy, in a 66-year-old woman who is on medical disability because of progressive multiple sclerosis.  She is in a wheelchair at baseline.  She has incontinence.  She can ambulate 20-25 steps with the assistance of a walker.  But we have to be careful about any therapy worsening her overall quality of life.  She continues to have multiple hospitalizations yearly due to complications from the MS.  We treat ductal carcinoma in situ to prevent the development of invasive disease.  Adjuvant radiotherapy is standard in this setting.  She received this with only adequate tolerance. In fact, she had significant fatigue months after completing the radiotherapy.  This colors her approach to any other therapies such as endocrine treatment.  In terms of systemic therapy, both tamoxifen and aromatase inhibitors have been studied in this setting and have never been shown to affect survival.  They can decrease the relative risk of invasive cancer, but the absolute benefit is in the low to mid single digits.  There is equivalence of 5 mg of tamoxifen for 3 years as compared to 20 mg for 5 years.  But clearly there is no impact on survival.  Marylee wants us to focus on her quality of life.  Therefore in my opinion, the benefit of adjuvant endocrine treatment in this setting is far outweighed by any chance of worsening her tenuous quality of life right now.  Now, 2-1/2 years from the time of her diagnosis, with no obvious evidence of recurrent disease by history or physical exam..  I had a long and involved conversation with Malorie Almendarez. Many  questions were asked and hopefully answered to her satisfaction.    Plan:       Return to clinic in 8 months for routine follow-up, with the next annual mammogram.    Kenny Martinez MD, MSc  Associate Professor of Medicine  [employer]  [facility] [address] [address]  [phone]    __________________________________________________________________    CHIEF COMPLAINT     Sub centimeter grade 2 DCIS, diagnosed at left breast lumpectomy 3/31/2022. Final pathology showed a 7 mm area of grade 2 ductal carcinoma in situ.  There was no evidence of invasion in the specimen.  Margins were uninvolved with the closest margin being 4 mm away.  The tumor was estrogen receptor positive.  Multiple Sclerosis, for which she is severely disabled. She spends most of the day in a wheelchair.  She has urinary incontinence due to a neurogenic bladder.  She does do physical therapy once a week.  With assistance from her  Manuel, she can ambulate about 20 feet with a walker.  She then gets tired.  She has issues with spasmatic muscles and is taking antispasmodics.  She has not had any specific drugs for her multiple sclerosis since 2017.  Things have been pretty stable.      History of Present Illness/THERAPY TO DATE:     Left lumpectomy 3/31/2022.  Adjuvant RT from 6/1-6/5/2022. (2600 cGy in 5 fractions). This was tolerated very poorly.  Observation in the interim. Endocrine therapy was discussed but the risk/benefit ratio was felt to be not worth prescribing.    Interval History:     Marylee is for a yearly visit, she states she has been feeling well overall physically in the past year  Saw Dr. Mercer of urology, who recommended topical estorgen for atrophic vaginitis.  Hospitalized 4/5-9/2024 with acute hypoxic respiratory failure, coincident E. coli UTI, sacral decubitus ulcer, and a type II non-ST elevated myocardial infarction.  Prior to that, she sustained a  right distal femur fracture 11/2023.  Doing pretty well these days.  Has no cough or shortness of breath.  Saw the musical written by an overrated oncologist and brings the program for an autograph.  Wants to know if she should get another mammogram (she should)  No new onset fatigue, no abdominal pain, no back or bone pain   Her and her  monitor closely for pressure sores out  Overall feels comfortable with any adjuvant systemic therapy.        Past Medical History:   I have reviewed this patient's past medical history   Past Medical History:   Diagnosis Date     Acute cystitis without hematuria      Acute pain of right knee      Atypical ductal hyperplasia of left breast 02/2022     Candida esophagitis - 2019 (H)      CKD (chronic kidney disease) stage 3, GFR 30-59 ml/min (H)      Community acquired pneumonia, unspecified laterality      Cough, unspecified type 04/05/2024     Depression      Epigastric pain      Former tobacco use      Fracture of femur, distal, left, closed (H)      Gastro-oesophageal reflux disease      Graves disease      History of fracture of fibula      History of tibial fracture      HSV (herpes simplex virus) infection      Hyperlipidemia with target LDL less than 130      Hyponatremia      L tib fx s/p IM nailing      Lung nodules     Currently managed with follow up imaging by [facility] result Team.        Multiple sclerosis (H)      Osteoporosis      Postablative hypothyroidism           Past Surgical History:    I have reviewed this patient's past surgical history       Social History:   Tobacco, ETOH, and rec drugs reviewed and as noted below with the following exceptions:  Malorie Almendarez grew up in [place] and graduated from [employer] in 1973.  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  After that she was in the  of another [employer] and she really started thinking that she wanted to do something else with her life.  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  She really liked the work.  She ultimately had to retire because of progression of her multiple sclerosis.  Her  is Manuel whom she met outside of the [employer].  [place] were [place] of [place]  They  in 1985.  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  They have 3 children, Marleni, Joss, and Janeth with one 1-year-old grandson named Sami who lives in [place].  Her bucket list included seeing the [place] (done) going to [employer] (done).  She is planning next week to go to [employer] and spent a lot of time in the [employer] themed areas since she is a big fan of those books.  She is a [employer]          Family History:     Family History   Problem Relation Age of Onset     Heart Disease Maternal Grandmother      Cancer Maternal Grandfather      Heart Disease Paternal Grandfather      Heart Disease Mother      Heart Disease Father      Diabetes No family hx of      Coronary Artery Disease No family hx of      Hypertension No family hx of      Hyperlipidemia No family hx of      Cerebrovascular Disease No family hx of      Breast Cancer No family hx of      Colon Cancer No family hx of      Prostate Cancer No family hx of      Other Cancer No family hx of      Depression No family hx of      Anxiety Disorder No family hx of      Mental Illness No family hx of      Substance Abuse No family hx of      Anesthesia Reaction No family hx of      Asthma No family hx of      Osteoporosis No family hx of      Genetic Disorder No family hx of      Thyroid Disease No family hx of      Obesity No family hx of      Unknown/Adopted No family hx of             Medications:     Current Outpatient Medications   Medication Sig Dispense Refill     acetaminophen (TYLENOL) 500 MG tablet Take 1-2 tablets (500-1,000 mg) by mouth every 8 hours as needed for mild pain or  fever (greater than 101 degrees)       acyclovir (ZOVIRAX) 400 MG tablet TAKE 1 TABLET BY MOUTH EVERY 12 HOURS 180 tablet 2     amLODIPine (NORVASC) 5 MG tablet Take 1 tablet (5 mg) by mouth daily 90 tablet 1     Armodafinil 200 MG TABS Take 200 mg by mouth every morning       baclofen (LIORESAL) 10 MG tablet Take 40 mg by mouth at bedtime In addition to morning and noon doses       baclofen (LIORESAL) 10 MG tablet Take 10 mg by mouth 2 times daily 0800 and noon (in addition to bedtime dose)       DULoxetine (CYMBALTA) 20 MG capsule TAKE 1 CAPSULE BY MOUTH ONCE DAILY ALONG  WITH  A  60  MG  TABLET  FOR  A  TOTAL  DOSE  OF  80  MG  DAILY 90 capsule 1     DULoxetine (CYMBALTA) 60 MG capsule Take 1 capsule by mouth once daily 90 capsule 3     famotidine (PEPCID) 40 MG tablet TAKE 1 TABLET BY MOUTH AT BEDTIME 90 tablet 2     gabapentin (NEURONTIN) 300 MG capsule Take 3 capsules (900 mg) by mouth 2 times daily       latanoprost (XALATAN) 0.005 % ophthalmic solution INSTILL 1 DROP INTO BOTH EYES EVERY DAY AS DIRECTED PT WILL NEED TO BE SEEN FOR FUTURE REFILLS       levothyroxine (SYNTHROID/LEVOTHROID) 112 MCG tablet Take 1 tablet (112 mcg) by mouth daily 90 tablet 3     losartan (COZAAR) 50 MG tablet Take 1 tablet (50 mg) by mouth 2 times daily 180 tablet 4     miconazole (MICATIN) 2 % external cream Apply topically 2 times daily       nitroFURantoin macrocrystal-monohydrate (MACROBID) 100 MG capsule Take 1 capsule (100 mg) by mouth daily 90 capsule 4     oxyCODONE (ROXICODONE) 5 MG tablet Take 0.5 tablets (2.5 mg) by mouth every 6 hours as needed for moderate pain 15 tablet 0     polyethylene glycol (MIRALAX) 17 GM/Dose powder Take 17 g by mouth daily as needed for constipation                Physical Exam:   Last menstrual period 01/31/2004, not currently breastfeeding.      Constitutional: WDWN female in NAD, pleasant and appropriate.  She has slight slurred speech and was examined in her wheelchair today.  Head: Full  head of hair  Sclera: Anicteric  Neck: Supple, free range of motion, no obvious jugular venous distention   Respirations: Unforced respiratory effort. Clear to auscultation bilaterally  Cardiovascular: S1-S2 regular rate and rhythm  GI: Nontender   Extremities: She has bilateral braces.  No obvious edema proximal of the knee  Neurologic: alert, answering questions appropriately, with some slurred speech.  She does not move her lower extremities since she is in the wheelchair.  Psych: appropriate affect  Breast exam: The left breast is status post lumpectomy and there is a well-healed periareolar scar without any obvious abnormality.  There is minimal skin thickening and hyperpigmentation from the prior radiation.  There is no discrete palpable mass.  The left axilla is negative.  The right breast has no significant palpable abnormality in the right axilla is negative.  Data:     Mammo/US 4/2024  Examinations: MA DIAGNOSTIC LEFT W/ KIM, LEFT breast ultrasound  4/16/2024 11:04 AM     Comparisons: 8/17/2023, 3/28/2022, 2/11/2022     History: Left breast pain for about a month. History of left breast  DCIS, status post lumpectomy.     BREAST DENSITY: The breasts are heterogeneously dense which may  obscure small masses.     Exam is limited due to patient positioning as the patient is in a  wheelchair.     Findings:     Mammogram:  Posttreatment changes in the left breast. No suspicious findings in  the left breast or significant change compared to prior.     Ultrasound:  Targeted left breast ultrasound evaluation was performed by the  technologist and radiologist. In the area of pain at the o'clock  position, 4 cm from the nipple, there is only normal-appearing breast  tissue. No suspicious findings.                                                                      IMPRESSION: BI-RADS CATEGORY: 2 - Benign.     RECOMMENDED FOLLOW-UP: Routine yearly mammography beginning at age 40  or as discussed with your  provider.     Given lack of imaging findings in the left breast, management of pain  would need to be based on clinical grounds.      Recent Labs   Lab Test 07/02/21  0727 07/01/21  0713 06/30/21  1717 06/10/21  0904 07/03/20  1525 11/27/18  0933 11/15/18  0711 11/12/18  0622   WBC 9.8 10.4 8.1 5.7 3.6* 3.2*  --  3.2*   NEUTROPHIL 83.8 84.8 77.5  --   --  78.0  --  76.7   HGB 12.6 12.2 14.7 12.2 9.2* 9.6*  --  7.4*    149* 194 177 181 179   < > 204    < > = values in this interval not displayed.     Recent Labs   Lab Test 02/24/22 0830 08/12/21  1033 07/09/21  1538 07/02/21 0727 07/01/21 0713 06/30/21 1717   NA  --  137 136 139 139 136   POTASSIUM  --  3.8 4.5 3.9  3.9 4.4 2.9*   CHLORIDE  --  102 102 104 109 98   CO2  --  34* 34* 25 23 32   ANIONGAP  --  1* <1* 9 7 6   BUN  --  15 9 25 12 13   CR 1.04 1.08* 0.98 0.96 0.88 0.97   JORDON 9.2 9.5 9.5 8.8 8.3* 9.6     Recent Labs   Lab Test 08/12/21  1033 07/01/21 0713 06/30/21 1717 11/02/18  0521 10/03/17  1449   MAG  --  1.8 1.9 1.8  --    PHOS 3.4  --  3.8  --   --    LDH  --   --   --   --  181     Recent Labs   Lab Test 08/12/21  1033 07/02/21 0727 07/01/21 0713 06/30/21 1717 11/01/17  0047 10/03/17  1449   BILITOTAL  --  0.7 0.8 0.7   < > 0.3   ALKPHOS  --  64 58 72   < > 78   ALT  --  19 16 21   < > 21   AST  --  30 33 26   < > 23   ALBUMIN 3.6 3.4 3.0* 4.0   < > 3.9   LDH  --   --   --   --   --  181    < > = values in this interval not displayed.       No results found for this or any previous visit (from the past 24 hour(s)).    Other Data     LEFT breast, 3:00, RFID seed localized lumpectomy:  -Ductal carcinoma in situ (DCIS), nuclear grade 2, size 7 mm, arising in a sclerosed intraductal papilloma  -No invasive carcinoma identified  -Margins are uninvolved by DCIS  -DCIS is 4 mm from the closest (posterior) margin, 5 mm from the lateral margin, and is > 5 mm from the  anterior, superior, inferior, and medial margins  -Other findings:  fibrocystic change (including microcysts) and usual ductal hyperplasia  -Calcifications associated with sclerotic stroma and with benign ducts and acini  -Prior core biopsy site change  -DCIS is estrogen receptor positive and progesterone receptor negative by immunohistochemistry (see  biomarker reporting template below)  -See tumor synoptic below  Electronically signed by Juana Palomares MD on 4/7/2022 at 10:45 PM  .  Synoptic Checklist  DCIS OF THE BREAST: Resection (DCIS OF THE BREAST: COMPLETE EXCISION - All Specimens) 8th Edition -  Protocol posted: 2/26/2020  SPECIMEN  Procedure Excision (less than total mastectomy)  Specimen Laterality Left  .      Labs, imaging and treatment plan reviewed with patient. All questions answered.        30 minutes spent on the date of the encounter doing chart review, review of outside records, review of test results, interpretation of tests, patient visit, documentation and discussion with family         Again, thank you for allowing me to participate in the care of your patient.        Sincerely,        Kenny Martinez MD

## 2024-08-15 NOTE — NURSING NOTE
"Oncology Rooming Note    August 15, 2024 10:52 AM   Marylee Woods is a 69 year old female who presents for:    Chief Complaint   Patient presents with    Oncology Clinic Visit    Breast Cancer     Ductal carcinoma in situ (DCIS) of left breast      Initial Vitals: /76 (BP Location: Right arm, Patient Position: Sitting, Cuff Size: Adult Regular)   Pulse 85   Temp 98.2  F (36.8  C) (Oral)   Resp 16   LMP 01/31/2004 (Approximate)   SpO2 100%  Estimated body mass index is 30.53 kg/m  as calculated from the following:    Height as of 4/5/24: 1.727 m (5' 8\").    Weight as of 4/8/24: 91.1 kg (200 lb 12.8 oz). There is no height or weight on file to calculate BSA.  No Pain (0) Comment: Data Unavailable   Patient's last menstrual period was 01/31/2004 (approximate).  Allergies reviewed: Yes  Medications reviewed: Yes    Medications: Medication refills not needed today.  Pharmacy name entered into [facility]:    [id number] - [place], [place] - [address]    Frailty Screening:   Is the patient here for a new oncology consult visit in cancer care? 2. No      Clinical concerns: Pt reports no new concerns today.       Teri Garcia, EMT   "

## 2024-08-16 ENCOUNTER — TRANSFERRED RECORDS (OUTPATIENT)
Dept: HEALTH INFORMATION MANAGEMENT | Facility: CLINIC | Age: 69
End: 2024-08-16
Payer: MEDICARE

## 2024-08-26 NOTE — Clinical Note
Hi Dr. Love: You are seeing Marylee next week - her TSH at her neurologist' was very high at 30 so I bumped up her levothyroxine, but will defer to you to adjust as needed.  Marylee wanted me to be sure to keep you in the loop. Thanks much, Dr. Carolina Pulliam MD / [facility]   John called in and asked if Dr. Salazar could place the order for him to receive the Cologuard test. Please advise.

## 2024-09-04 ENCOUNTER — TRANSFERRED RECORDS (OUTPATIENT)
Dept: HEALTH INFORMATION MANAGEMENT | Facility: CLINIC | Age: 69
End: 2024-09-04
Payer: MEDICARE

## 2024-09-10 DIAGNOSIS — F33.1 MODERATE EPISODE OF RECURRENT MAJOR DEPRESSIVE DISORDER (H): ICD-10-CM

## 2024-09-11 RX ORDER — DULOXETIN HYDROCHLORIDE 20 MG/1
CAPSULE, DELAYED RELEASE ORAL
Qty: 90 CAPSULE | Refills: 0 | Status: SHIPPED | OUTPATIENT
Start: 2024-09-11

## 2024-09-12 ENCOUNTER — MEDICAL CORRESPONDENCE (OUTPATIENT)
Dept: HEALTH INFORMATION MANAGEMENT | Facility: CLINIC | Age: 69
End: 2024-09-12
Payer: MEDICARE

## 2024-09-13 ENCOUNTER — TRANSFERRED RECORDS (OUTPATIENT)
Dept: HEALTH INFORMATION MANAGEMENT | Facility: CLINIC | Age: 69
End: 2024-09-13
Payer: MEDICARE

## 2024-09-17 ENCOUNTER — TELEPHONE (OUTPATIENT)
Dept: FAMILY MEDICINE | Facility: CLINIC | Age: 69
End: 2024-09-17
Payer: MEDICARE

## 2024-09-17 NOTE — TELEPHONE ENCOUNTER
Forms/Letter Request    Type of form/letter: OTHER: Repairs for Power Wheelchair       Do we have the form/letter: Yes: Placed in care team 3    Who is the form from? [employer]    Where did/will the form come from? form was faxed in    When is form/letter needed by: as soon as able    How would you like the form/letter returned: Fax : [phone]

## 2024-09-18 NOTE — TELEPHONE ENCOUNTER
Will do forms when I am back in clinic next week.   Dr. Carolina Pulliam MD / [facility]

## 2024-09-23 ENCOUNTER — NURSE TRIAGE (OUTPATIENT)
Dept: FAMILY MEDICINE | Facility: CLINIC | Age: 69
End: 2024-09-23
Payer: MEDICARE

## 2024-09-23 NOTE — TELEPHONE ENCOUNTER
"States she is chronically constipated  Has taken miralax a few times   On 9/15 drank 10 oz of magnesium citrate  She took this because she was only having a BM once a week   9/16 - 9/19 had diarrhea  9/19 evening had a small soft formed BM  Right \"side\" hurts on palpitation  Lower abdomen   States that the pain and nausea are pretty constant but come and go  We discussed that she can take miralax daily -- she was not open to that  Has been taking other options too (stool softener, normal magnesium)  States that she eats \"fiber cookies. So, it cannot be that\"  Is WC bound so hard to encourage exercise for motility   Did encourage fluids   We did review the concerns about that abdominal pain and nausea   She was hoping this could wait until her 10/4/24 appt with PCP  Advised that she be seen sooner  She takes [employer] and will call to see if she can get a ride to be seen in-clinic tomorrow  If not she will call back and we will figure out a new plan for her to see    Reason for Disposition   Last bowel movement (BM) > 4 days ago    Additional Information   Negative: Abdomen pain is main symptom and male   Negative: Abdomen pain is main symptom and female   Negative: Rectal bleeding or blood in stool is main symptom   Negative: Vomiting bile (green color)   Negative: Patient sounds very sick or weak to the triager   Negative: Constant abdominal pain lasting > 2 hours   Negative: Vomiting and abdomen looks much more swollen than usual   Negative: Rectal pain or fullness from fecal impaction (rectum full of stool) and NOT better after SITZ bath, suppository or enema   Negative: Abdomen is more swollen than usual    Protocols used: Constipation-A-OH    Coretta Hernandez RN  [employer]  "

## 2024-09-24 ENCOUNTER — OFFICE VISIT (OUTPATIENT)
Dept: FAMILY MEDICINE | Facility: CLINIC | Age: 69
End: 2024-09-24
Payer: MEDICARE

## 2024-09-24 VITALS
RESPIRATION RATE: 20 BRPM | TEMPERATURE: 98.1 F | BODY MASS INDEX: 30.41 KG/M2 | SYSTOLIC BLOOD PRESSURE: 128 MMHG | HEART RATE: 95 BPM | OXYGEN SATURATION: 99 % | DIASTOLIC BLOOD PRESSURE: 70 MMHG | WEIGHT: 200 LBS

## 2024-09-24 DIAGNOSIS — K59.09 OTHER CONSTIPATION: Primary | ICD-10-CM

## 2024-09-24 PROCEDURE — 99213 OFFICE O/P EST LOW 20 MIN: CPT

## 2024-09-24 ASSESSMENT — PAIN SCALES - GENERAL: PAINLEVEL: MILD PAIN (3)

## 2024-09-24 ASSESSMENT — PATIENT HEALTH QUESTIONNAIRE - PHQ9
SUM OF ALL RESPONSES TO PHQ QUESTIONS 1-9: 9
SUM OF ALL RESPONSES TO PHQ QUESTIONS 1-9: 9
10. IF YOU CHECKED OFF ANY PROBLEMS, HOW DIFFICULT HAVE THESE PROBLEMS MADE IT FOR YOU TO DO YOUR WORK, TAKE CARE OF THINGS AT HOME, OR GET ALONG WITH OTHER PEOPLE: NOT DIFFICULT AT ALL

## 2024-09-24 NOTE — PROGRESS NOTES
Assessment & Plan     Other constipation - chronic, due to MS.  On polyethylene glycol  Discussed RLQ abdominal pain seems most likely related to her acute on chronic constipation, but cannot rule out other causes without imaging. Overall reassuring that she appears well, afebrile, not vomiting- low suspicion for appendicitis. She would like to get constipation under control first and then see how abdominal pain does from there, she does have follow-up scheduled with her PCP on 10/4 to reassess at that time.   Discussed in the mean time if developing worsening or severe abdominal pain, fevers, vomiting, should be seen in ER.   For constipation, recommend increasing miralax to daily or even twice daily, continue daily stool softener, and increase fiber in diet. Discussed if difficult to increase fiber in diet can supplement with psyllium (metamucil) powder 1-3 times daily over the counter.   Drink plenty of water!  Foods with high fiber content include:  dates, prunes, strawberries, apple with skin, pear with skin, green beans, broccoli, brussles sprouts, carrots, peas, spinach, zucchini, baked beans, kidney beans, peanuts, bran muffins, oatmeal, oat bran, wheat bran and rolled oats.      Declines covid vaccination today.    Subjective Marylee is a 69 year old, presenting for the following health issues:  Constipation and Pain (Right side of abd)        9/24/2024     3:43 PM   Additional Questions   Roomed by Alison   Accompanied by alone         9/24/2024     3:43 PM   Patient Reported Additional Medications   Patient reports taking the following new medications none     History of Present Illness       Reason for visit:  Constipation and pain in my right side.  Symptom onset:  More than a month  Symptoms include:  Chronic constipation, haven't pooped in a week.  Pain in right side, to left of hip, painful, while in car or taking [employer] going over bumps it is painful.  Trying to take Miralax every other  day.  Have been naseous for quite a while.  Symptom intensity:  Moderate  Symptom progression:  Worsening  Had these symptoms before:  Yes  Has tried/received treatment for these symptoms:  Yes  Previous treatment was successful:  No  What makes it worse:  When my side really starts to hurt my appetite goes down.  What makes it better:  I quess pooping but that is a big ordeal and lately my side is very tender afterwards.   She is taking medications regularly.     Pt here for constipation  Deals with chronic constipation, says she is supposed to be taking miralax every other day and a stool softener daily and she tends to forget these.     Keenan 9/15 drank a a 10 oz bottle of magnesium citrate and then had diarrhea all week. Then stools became more soft and formed and now constipated again.     Right lower quadrant abdominal discomfort, pain when going over bumps in the vehicle. Pain is constant, varies in intensity. Seems to be worse the more she eats and the longer she goes without having a bowel movement.     No vomiting, no fevers. No blood in stools or urine.       Review of Systems  Constitutional, HEENT, cardiovascular, pulmonary, gi and gu systems are negative, except as otherwise noted.      Objective    /70 (BP Location: Right arm, Patient Position: Sitting, Cuff Size: Adult Regular)   Pulse 95   Temp 98.1  F (36.7  C) (Temporal)   Resp 20   Wt 90.7 kg (200 lb)   LMP 01/31/2004 (Approximate)   SpO2 99%   BMI 30.41 kg/m    Body mass index is 30.41 kg/m .  Physical Exam   GENERAL: alert and no distress  ABDOMEN: RLQ tenderness to palpation. soft, no hepatosplenomegaly, no masses and bowel sounds normal  MS: wheelchair bound.   NEURO:  mentation intact and speech normal  PSYCH: mentation appears normal, affect normal/bright          Signed Electronically by: SWAPNIL Holley CNP

## 2024-09-24 NOTE — PATIENT INSTRUCTIONS
Psyllium (metamucil) powder in water 1-2 times daily  Miralax daily  Stool softener daily    Drink plenty of water.  Increase fiber in diet- Foods with high fiber content include:  dates, prunes, strawberries, apple with skin, pear with skin, green beans, broccoli, brussles sprouts, carrots, peas, spinach, zucchini, baked beans, kidney beans, peanuts, bran muffins, oatmeal, oat bran, wheat bran and rolled oats.

## 2024-09-25 ENCOUNTER — TRANSFERRED RECORDS (OUTPATIENT)
Dept: HEALTH INFORMATION MANAGEMENT | Facility: CLINIC | Age: 69
End: 2024-09-25
Payer: MEDICARE

## 2024-10-04 ENCOUNTER — OFFICE VISIT (OUTPATIENT)
Dept: FAMILY MEDICINE | Facility: CLINIC | Age: 69
End: 2024-10-04
Payer: MEDICARE

## 2024-10-04 VITALS
RESPIRATION RATE: 20 BRPM | TEMPERATURE: 96.8 F | DIASTOLIC BLOOD PRESSURE: 62 MMHG | HEART RATE: 83 BPM | OXYGEN SATURATION: 97 % | SYSTOLIC BLOOD PRESSURE: 132 MMHG

## 2024-10-04 DIAGNOSIS — K59.09 OTHER CONSTIPATION: ICD-10-CM

## 2024-10-04 DIAGNOSIS — F33.1 MODERATE EPISODE OF RECURRENT MAJOR DEPRESSIVE DISORDER (H): ICD-10-CM

## 2024-10-04 DIAGNOSIS — I10 HYPERTENSION GOAL BP (BLOOD PRESSURE) < 140/90: Primary | ICD-10-CM

## 2024-10-04 PROCEDURE — 99215 OFFICE O/P EST HI 40 MIN: CPT | Performed by: FAMILY MEDICINE

## 2024-10-04 PROCEDURE — G2211 COMPLEX E/M VISIT ADD ON: HCPCS | Performed by: FAMILY MEDICINE

## 2024-10-04 ASSESSMENT — PATIENT HEALTH QUESTIONNAIRE - PHQ9
SUM OF ALL RESPONSES TO PHQ QUESTIONS 1-9: 9
SUM OF ALL RESPONSES TO PHQ QUESTIONS 1-9: 9
10. IF YOU CHECKED OFF ANY PROBLEMS, HOW DIFFICULT HAVE THESE PROBLEMS MADE IT FOR YOU TO DO YOUR WORK, TAKE CARE OF THINGS AT HOME, OR GET ALONG WITH OTHER PEOPLE: SOMEWHAT DIFFICULT

## 2024-10-04 ASSESSMENT — PAIN SCALES - GENERAL: PAINLEVEL: MILD PAIN (3)

## 2024-10-04 NOTE — NURSING NOTE
Writer assessed bottom of right foot near lateral upper bone. Small pressure scab that is unstageable.   Cleansed wound with wound cleanser, patted dry.   Placed small mepilex bandage over area to protect.     Noy Burton, SAEIDN RN  [facility]

## 2024-10-04 NOTE — PROGRESS NOTES
Assessment & Plan     Problem List as of 10/4/2024 Reviewed: 9/24/2024  4:25 PM by Justine Garcia APRN CNP            Noted       Medium    1. Constipation - chronic, due to MS.  On polyethylene glycol 6/30/2021     Overview Signed 10/4/2024  5:24 PM by Carolina Pulliam MD      10/04/2024 A/P:  Continue polyethylene glycol (Miralax) daily and add metamucil daily.           2. Hypertension goal BP (blood pressure) < 140/90 - Primary 4/3/2024     Overview Addendum 4/3/2024  4:28 PM by Carolina Pulliam MD      2024: stopped hydrochlorothiazide due to hyponatremia  BP at goal only ~50% of time.  Increase losartan to 75 mg (2 in PM, 1 in AM)         3. Major depression in complete remission (H) 1/8/2015     Overview Addendum 10/4/2024  5:25 PM by Carolina Pulliam MD      10/04/2024 A/P:  Worsened a bit, and stress of illness and in marriage adds to.  Referred to therapy again (gave her names from April visit)  Also referred to  mental health services  Also discussed asking her physical therapy to help her be able to get from bed to chair independently in the morning    2024: On duloxetine, stable          Relevant Orders     Adult Mental Health  Referral                 Subjective   Marylee is a 69 year old, presenting for the following health issues:  Follow Up (Constipation /Pt also has some concerns about her right foot )        10/4/2024     1:19 PM   Additional Questions   Roomed by Sheng bailey   Accompanied by Self     HPI         Objective    /62   Pulse 83   Temp 96.8  F (36  C) (Temporal)   Resp 20   LMP 01/31/2004 (Approximate)   SpO2 97%   There is no height or weight on file to calculate BMI.  Physical Exam   GENERAL: alert and no distress                I spent a total of 42 minutes on the day of the visit.   Time spent by me doing chart review, history and exam, documentation and further activities per the note  Signed Electronically by: Carolina Pulliam MD    Answers  submitted by the patient for this visit:  Patient Health Questionnaire (Submitted on 10/4/2024)  If you checked off any problems, how difficult have these problems made it for you to do your work, take care of things at home, or get along with other people?: Somewhat difficult  PHQ9 TOTAL SCORE: 9     Patient/Caregiver provided printed discharge information.

## 2024-10-16 ENCOUNTER — LAB (OUTPATIENT)
Dept: LAB | Facility: CLINIC | Age: 69
End: 2024-10-16
Payer: MEDICARE

## 2024-10-16 DIAGNOSIS — N30.00 ACUTE CYSTITIS WITHOUT HEMATURIA: Primary | ICD-10-CM

## 2024-10-16 DIAGNOSIS — I10 HYPERTENSION GOAL BP (BLOOD PRESSURE) < 140/90: ICD-10-CM

## 2024-10-16 LAB
ALBUMIN UR-MCNC: 30 MG/DL
APPEARANCE UR: CLEAR
BACTERIA #/AREA URNS HPF: ABNORMAL /HPF
BILIRUB UR QL STRIP: NEGATIVE
COLOR UR AUTO: YELLOW
GLUCOSE UR STRIP-MCNC: NEGATIVE MG/DL
HGB UR QL STRIP: ABNORMAL
KETONES UR STRIP-MCNC: NEGATIVE MG/DL
LEUKOCYTE ESTERASE UR QL STRIP: ABNORMAL
MUCOUS THREADS #/AREA URNS LPF: PRESENT /LPF
NITRATE UR QL: POSITIVE
PH UR STRIP: 5.5 [PH] (ref 5–7)
RBC #/AREA URNS AUTO: ABNORMAL /HPF
SP GR UR STRIP: 1.01 (ref 1–1.03)
SQUAMOUS #/AREA URNS AUTO: ABNORMAL /LPF
UROBILINOGEN UR STRIP-ACNC: 0.2 E.U./DL
WBC #/AREA URNS AUTO: >100 /HPF
WBC CLUMPS #/AREA URNS HPF: PRESENT /HPF

## 2024-10-16 PROCEDURE — 87088 URINE BACTERIA CULTURE: CPT

## 2024-10-16 PROCEDURE — 87186 SC STD MICRODIL/AGAR DIL: CPT

## 2024-10-16 PROCEDURE — 87086 URINE CULTURE/COLONY COUNT: CPT

## 2024-10-16 PROCEDURE — 81001 URINALYSIS AUTO W/SCOPE: CPT

## 2024-10-16 RX ORDER — NITROFURANTOIN 25; 75 MG/1; MG/1
100 CAPSULE ORAL 2 TIMES DAILY
Qty: 14 CAPSULE | Refills: 0 | Status: SHIPPED | OUTPATIENT
Start: 2024-10-16 | End: 2024-10-23

## 2024-10-17 LAB — BACTERIA UR CULT: ABNORMAL

## 2024-10-18 DIAGNOSIS — N30.00 ACUTE CYSTITIS WITHOUT HEMATURIA: Primary | ICD-10-CM

## 2024-10-18 RX ORDER — CIPROFLOXACIN 500 MG/1
500 TABLET, FILM COATED ORAL 2 TIMES DAILY
Qty: 14 TABLET | Refills: 0 | Status: SHIPPED | OUTPATIENT
Start: 2024-10-18 | End: 2024-10-25

## 2024-10-29 ENCOUNTER — TELEPHONE (OUTPATIENT)
Dept: FAMILY MEDICINE | Facility: CLINIC | Age: 69
End: 2024-10-29
Payer: MEDICARE

## 2024-10-29 LAB
ALBUMIN UR-MCNC: NEGATIVE MG/DL
APPEARANCE UR: ABNORMAL
BACTERIA #/AREA URNS HPF: ABNORMAL /HPF
BILIRUB UR QL STRIP: NEGATIVE
COLOR UR AUTO: YELLOW
GLUCOSE UR STRIP-MCNC: NEGATIVE MG/DL
HGB UR QL STRIP: NEGATIVE
KETONES UR STRIP-MCNC: NEGATIVE MG/DL
LEUKOCYTE ESTERASE UR QL STRIP: ABNORMAL
MUCOUS THREADS #/AREA URNS LPF: PRESENT /LPF
NITRATE UR QL: NEGATIVE
PH UR STRIP: 7 [PH] (ref 5–7)
RBC #/AREA URNS AUTO: ABNORMAL /HPF
SP GR UR STRIP: 1.01 (ref 1–1.03)
SQUAMOUS #/AREA URNS AUTO: ABNORMAL /LPF
UROBILINOGEN UR STRIP-ACNC: 0.2 E.U./DL
WBC #/AREA URNS AUTO: ABNORMAL /HPF

## 2024-10-29 PROCEDURE — 87086 URINE CULTURE/COLONY COUNT: CPT

## 2024-10-29 PROCEDURE — 81001 URINALYSIS AUTO W/SCOPE: CPT

## 2024-10-29 NOTE — TELEPHONE ENCOUNTER
"  General Call    Contacts       Contact Date/Time Type Contact Phone/Fax    10/29/2024 11:35 AM CDT Phone (Incoming) Woods, Marylee (Self) [phone] (M)          Reason for Call:  Another UA sample/ Constipation update    What are your questions or concerns:      Patient calling clinic as she recently had a UTI, and was prescribed ciprofloxacin. Patient finished this on Saturday, but still has burning with urination. Patient is calling to inform writer that due to these continued symptoms, patient's spouse will be bringing in a \"urine sample\" to  clinic. Patient states there is a standing order Dr. Pulliam wrote for patient for these specific reasons. There does appear to be a standing order for UA.    Patient also wanted to just inform PCP about her recommendation on constipation. Patient has been taking Miralax and Metamucil together. Once patient has BM, her right side (near right hip area) still hurts. Patient is wondering what this might mean? This tends to hurt all the time. This is always tender/sore. Patient does not think this is life threatening as it has been going on for at least a year. Patient was just curious and wanted to ask Dr. Pulliam.    Date of last appointment with provider: 10/04/2024    Could we send this information to you in [place] or would you prefer to receive a phone call?:   Patient would prefer a phone call   Okay to leave a detailed message?: Yes at Cell number on file:    Telephone Information:   Mobile [phone]       "

## 2024-10-29 NOTE — TELEPHONE ENCOUNTER
I will watch for the UA sample.    And since it has been going on for a year and not worsening it probably just means distention from her chronic constipation.  Next time I see her though have her bring it up to me and we can look and see if she has not had imaging and I can do an exam of that area.

## 2024-10-30 LAB — BACTERIA UR CULT: NORMAL

## 2024-10-30 NOTE — TELEPHONE ENCOUNTER
This all relayed to pt as well as the fact her UC shows no infection    She was also has burning and I advised she only use plain warm water on her bottom and minimal if any soaps for clean up. She has been using commercial wipes and I advised against this. Loose cotton clothing.     If sx persist she was advised to make in person appt and she agrees.    Mabel KWOK RN, BSN  [facility]

## 2024-12-02 ENCOUNTER — LAB (OUTPATIENT)
Dept: LAB | Facility: CLINIC | Age: 69
End: 2024-12-02
Payer: MEDICARE

## 2024-12-02 ENCOUNTER — OFFICE VISIT (OUTPATIENT)
Dept: ENDOCRINOLOGY | Facility: CLINIC | Age: 69
End: 2024-12-02
Payer: MEDICARE

## 2024-12-02 VITALS — SYSTOLIC BLOOD PRESSURE: 145 MMHG | DIASTOLIC BLOOD PRESSURE: 74 MMHG | OXYGEN SATURATION: 100 % | HEART RATE: 87 BPM

## 2024-12-02 DIAGNOSIS — E04.2 MULTIPLE THYROID NODULES: ICD-10-CM

## 2024-12-02 DIAGNOSIS — M81.0 OSTEOPOROSIS OF MULTIPLE SITES: ICD-10-CM

## 2024-12-02 DIAGNOSIS — E89.0 POSTABLATIVE HYPOTHYROIDISM: ICD-10-CM

## 2024-12-02 DIAGNOSIS — M81.0 OSTEOPOROSIS OF MULTIPLE SITES: Primary | ICD-10-CM

## 2024-12-02 LAB
ALBUMIN SERPL BCG-MCNC: 4 G/DL (ref 3.5–5.2)
ANION GAP SERPL CALCULATED.3IONS-SCNC: 8 MMOL/L (ref 7–15)
BUN SERPL-MCNC: 17.4 MG/DL (ref 8–23)
CALCIUM SERPL-MCNC: 9.9 MG/DL (ref 8.8–10.4)
CHLORIDE SERPL-SCNC: 104 MMOL/L (ref 98–107)
CREAT SERPL-MCNC: 0.88 MG/DL (ref 0.51–0.95)
EGFRCR SERPLBLD CKD-EPI 2021: 71 ML/MIN/1.73M2
GLUCOSE SERPL-MCNC: 84 MG/DL (ref 70–99)
HCO3 SERPL-SCNC: 29 MMOL/L (ref 22–29)
PHOSPHATE SERPL-MCNC: 3.5 MG/DL (ref 2.5–4.5)
POTASSIUM SERPL-SCNC: 5 MMOL/L (ref 3.4–5.3)
PTH-INTACT SERPL-MCNC: 71 PG/ML (ref 15–65)
SODIUM SERPL-SCNC: 141 MMOL/L (ref 135–145)
T4 FREE SERPL-MCNC: 1.4 NG/DL (ref 0.9–1.7)
TSH SERPL DL<=0.005 MIU/L-ACNC: 0.57 UIU/ML (ref 0.3–4.2)

## 2024-12-02 PROCEDURE — 80069 RENAL FUNCTION PANEL: CPT | Performed by: PATHOLOGY

## 2024-12-02 PROCEDURE — 82306 VITAMIN D 25 HYDROXY: CPT | Performed by: INTERNAL MEDICINE

## 2024-12-02 PROCEDURE — G2211 COMPLEX E/M VISIT ADD ON: HCPCS | Performed by: INTERNAL MEDICINE

## 2024-12-02 PROCEDURE — 84443 ASSAY THYROID STIM HORMONE: CPT | Performed by: PATHOLOGY

## 2024-12-02 PROCEDURE — 83970 ASSAY OF PARATHORMONE: CPT | Performed by: PATHOLOGY

## 2024-12-02 PROCEDURE — 36415 COLL VENOUS BLD VENIPUNCTURE: CPT | Performed by: PATHOLOGY

## 2024-12-02 PROCEDURE — 99000 SPECIMEN HANDLING OFFICE-LAB: CPT | Performed by: PATHOLOGY

## 2024-12-02 PROCEDURE — 84439 ASSAY OF FREE THYROXINE: CPT | Performed by: PATHOLOGY

## 2024-12-02 PROCEDURE — 99214 OFFICE O/P EST MOD 30 MIN: CPT | Mod: GC | Performed by: INTERNAL MEDICINE

## 2024-12-02 RX ORDER — ALBUTEROL SULFATE 90 UG/1
1-2 INHALANT RESPIRATORY (INHALATION)
Start: 2024-12-09

## 2024-12-02 RX ORDER — DIPHENHYDRAMINE HYDROCHLORIDE 50 MG/ML
25 INJECTION INTRAMUSCULAR; INTRAVENOUS
Start: 2024-12-09

## 2024-12-02 RX ORDER — EPINEPHRINE 1 MG/ML
0.3 INJECTION, SOLUTION, CONCENTRATE INTRAVENOUS EVERY 5 MIN PRN
OUTPATIENT
Start: 2024-12-09

## 2024-12-02 RX ORDER — MEPERIDINE HYDROCHLORIDE 25 MG/ML
25 INJECTION INTRAMUSCULAR; INTRAVENOUS; SUBCUTANEOUS
OUTPATIENT
Start: 2024-12-09

## 2024-12-02 RX ORDER — DIPHENHYDRAMINE HYDROCHLORIDE 50 MG/ML
50 INJECTION INTRAMUSCULAR; INTRAVENOUS
Start: 2024-12-09

## 2024-12-02 RX ORDER — ALBUTEROL SULFATE 0.83 MG/ML
2.5 SOLUTION RESPIRATORY (INHALATION)
OUTPATIENT
Start: 2024-12-09

## 2024-12-02 ASSESSMENT — PAIN SCALES - GENERAL: PAINLEVEL_OUTOF10: NO PAIN (0)

## 2024-12-02 NOTE — RESULT ENCOUNTER NOTE
The thyroid hormones and the calcium are normal.  The parathyroid hormone is borderline elevated, not significantly changed compared with the prior lab results.  The vitamin D level is pending.

## 2024-12-02 NOTE — PROGRESS NOTES
[facility]       Follow up for Osteoporosis and post-ablative hypothyroidism    ASSESSMENT/PLAN:     1.  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  Recent TSH has been elevated with subsequent adjustments in Levothyroxine doses. Will recheck today    2.  Osteoporosis, severe, based on history of femoral fractures after falling from a standing height, in 2009 and 2018 and after a wheelchair accident in 11/2023.  Left total hip T-score -6 in 3/27/2024, risk factors history of smoking, menopause, aging, Graves disease. Plan for Evenity injections with consolidative treatment following.    Recommendations:  Lab check today TSH, T4, PTH, Vitamin D, BMP, phos, albumin  Start Evenity    Return to clinic in 1 year, follow up with Dr. Flowers for Evenity monitoring in April as scheduled.    Orders Placed This Encounter   Procedures    TSH    T4 free    Basic metabolic panel    Phosphorus    Albumin level    Parathyroid Hormone Intact    25 Hydroxyvitamin D2 and D3     The patient was seen and discussed with attending, Dr. Bean, who is in agreement with plan.    Abby Lemus, DO  Internal Medicine PGY3    =========================================================================================    Interval history  Had been dealing with elevated TSH this summer, increased Levothyroxine to 112 mcg. Has chronic constipation, no significant weight change, brittle nails but hair normal. Falls asleep well but medications sometimes keep her up. Endorses cold intolerance.     Had been planned to start Evenity injections monthly at last visit, however, initially had some confusion about how to schedule these. She wanted to check with her neurologist prior to starting this medication and they have ok'ed it. She was also delayed as she spent time in [place] with her daughter who recently had a child. Still interested in treatment with Evenity, no new  cardiovascular disease or events.   - weight stable, no new falls, PT 2x/week    Most recent DXA scan 3/27/24 T-score -6 left total hip    Osteoporosis medication since last visit: last Reclast infusion 3/23, tolerated this well  received Reclast infusion in 3/23, 2/22  Treatment with Fosamax was recommended by her orthopedic surgeon, in 2018. As per patient, she started Fosamax in 2020 and she took it for approximately 1 year.    Calcium supplement ~600 mg daily  Vitamin D: in her calcium  ~800 international unit(s) daily, plus MVI  Physical activity: step therapy 2x/week  Dietary calcium: 1-2x/week    Prior fractures:  2009 Right nondisplaced intertrochanteric fracture.  2013 Left tibial and fibular fracture   10/2018 Transverse fracture of the distal left femoral diaphysis, after falling from standing height.  6/2020 Left tibial plateau fracture   11/2023 right distal femoral fracture and left malleolus fracture after the wheelchair felt on a side.  Orthopedics following and managing conservatively.    Hypothyroidism  Has been takign LT4 112 mcg in am as directed    HPI  1.  Post ablative hypothyroidism, which ensured following radioiodine treatment for Graves' disease  Marylee follows up with Dr. Redman for MS, at [facility].  The patient has had the thyroid hormone levels checked quite frequently, through her neurology clinic, given prior history of treatment with [place], in 4522-2829.  She was formally diagnosed with seropositive Graves' disease in October 2020.  Thyroid-stimulating immunoglobulins were positive at 6.4. Subsequently, she underwent treatment with 9.3 mCi I-131, on 2/9/2021.  Prior to treatment, the scan revealed a 24-hour uptake of 87.5%, with a computer estimated weight of 42.2 g.  The radiotracer uptake was uniform, with the exception of the caudal region in the inferior left thyroid lobe, which corresponded to a nodule biopsied in 2017.     In 2017, she was  incidentally diagnosed with thyroid nodules during a CAT scan.  The thyroid ultrasound from 9/7/2017 revealed multiple thyroid nodules.  The left inferior #4 thyroid nodule, measuring 2.2 cm, was benign on biopsy. Overall, the nodules had a benign ultrasound appearance, mostly spongiform.  The biopsied nodule had an internal macrocalcification, minimal internal vascularity, but no definite features suggestive of malignancy.  The patient has no prior history of radiation exposure or a family history of thyroid disease.  On the follow-up thyroid ultrasound from 1/14/2021, there were no significant changes of the thyroid nodules.    For the last 6-7 years, she has been using a wheelchair, prior to hip fracture in 11/23, she was able to stand and pivot, is working with  PT and homehealth to get back to standing and taking a few steps.    She remains on treatment with Pepcid, at night.  Marylee smoked since age 17, with intermittent breaks, generally less than half a pack a day.  She quit smoking in 2022.   She has both upper and lower dentures.   The left foot has a heel sore and wears a boot.     Past Medical History  Past Medical History:   Diagnosis Date    Gastro-oesophageal reflux disease     Multiple sclerosis (H) 1990s   May 2020, diagnosed with Covid  Depression  Pancytopenia dating back to at least 2014  Hyperlipidemia  Lung nodules  Candida esophagitis  UTI  R intertrochanteric femoral fracture 2009   Left tibial and fibular fracture in 2013  Left tibial plateau fracture June 2020   Left femoral fracture in 10/2018, after falling from standing height (above the knee)    Medications    Current Outpatient Medications:     acetaminophen (TYLENOL) 500 MG tablet, Take 1-2 tablets (500-1,000 mg) by mouth every 8 hours as needed for mild pain or fever (greater than 101 degrees), Disp: , Rfl:     acyclovir (ZOVIRAX) 400 MG tablet, TAKE 1 TABLET BY MOUTH EVERY 12 HOURS, Disp: 180 tablet, Rfl: 2    amLODIPine (NORVASC)  5 MG tablet, Take 1 tablet (5 mg) by mouth daily, Disp: 90 tablet, Rfl: 1    Armodafinil 200 MG TABS, Take 200 mg by mouth every morning, Disp: , Rfl:     baclofen (LIORESAL) 10 MG tablet, Take 40 mg by mouth at bedtime In addition to morning and noon doses, Disp: , Rfl:     baclofen (LIORESAL) 10 MG tablet, Take 10 mg by mouth 2 times daily 0800 and noon (in addition to bedtime dose), Disp: , Rfl:     Calcium Carbonate Antacid 600 MG CHEW, Take 600 mg by mouth daily, Disp: , Rfl:     DULoxetine (CYMBALTA) 20 MG capsule, TAKE 1 CAPSULE BY MOUTH ONCE DAILY ALONG  WITH  A  60  MG  TABLET  FOR  A  TOTAL  DOSE  OF  80  MG  DAILY, Disp: 90 capsule, Rfl: 0    DULoxetine (CYMBALTA) 60 MG capsule, Take 1 capsule by mouth once daily, Disp: 90 capsule, Rfl: 3    famotidine (PEPCID) 40 MG tablet, TAKE 1 TABLET BY MOUTH AT BEDTIME, Disp: 90 tablet, Rfl: 2    gabapentin (NEURONTIN) 300 MG capsule, Take 3 capsules (900 mg) by mouth 2 times daily, Disp: , Rfl:     latanoprost (XALATAN) 0.005 % ophthalmic solution, INSTILL 1 DROP INTO BOTH EYES EVERY DAY AS DIRECTED PT WILL NEED TO BE SEEN FOR FUTURE REFILLS, Disp: , Rfl:     levothyroxine (SYNTHROID/LEVOTHROID) 112 MCG tablet, Take 1 tablet (112 mcg) by mouth daily, Disp: 90 tablet, Rfl: 3    losartan (COZAAR) 50 MG tablet, Take 1 tablet (50 mg) by mouth 2 times daily, Disp: 180 tablet, Rfl: 4    Multiple Vitamins-Minerals (MULTIPLE VITAMINS/WOMENS PO), Take by mouth daily, Disp: , Rfl:     nitroFURantoin macrocrystal-monohydrate (MACROBID) 100 MG capsule, Take 1 capsule (100 mg) by mouth daily, Disp: 90 capsule, Rfl: 4    polyethylene glycol (MIRALAX) 17 GM/Dose powder, Take 17 g by mouth daily as needed for constipation, Disp: , Rfl:     YUVAFEM 10 MCG TABS vaginal tablet, Place 10 mcg vaginally twice a week, Disp: , Rfl:     Allergies  Allergies   Allergen Reactions    Bactrim [Sulfamethoxazole-Trimethoprim] Hallucination    Hydrochlorothiazide      Hyponatremia     Sulfamethizole     Amantadine      hallucinations    Budesonide     Budesonide-Formoterol Fumarate Rash     Family History  family history includes Cancer in her maternal grandfather; Heart Disease in her father, maternal grandmother, mother, and paternal grandfather.  She has positive family of autoimmune disease. Her mom has lupus and hypopituitarism. No family history of thyroid disease.  Maternal grandfather had throat cancer. Type 1 diabetes - maternal grandmother. Diabetes - maternal aunt.     Social History  , 2 children.  She denies drinking alcohol or using illicit drugs.  History of long-term smoking.  Social History     Tobacco Use    Smoking status: Former     Current packs/day: 0.00     Types: Cigarettes     Quit date: 2022     Years since quittin.9     Passive exposure: Current    Smokeless tobacco: Never    Tobacco comments:     Once in a while    Vaping Use    Vaping status: Never Used   Substance Use Topics    Alcohol use: Yes     Alcohol/week: 0.0 standard drinks of alcohol     Comment: occasional     Drug use: No      Physical Exam   LMP 2004 (Approximate)     Wt Readings from Last 10 Encounters:   24 90.7 kg (200 lb)   24 91.1 kg (200 lb 12.8 oz)   24 69.4 kg (153 lb)   24 58.7 kg (129 lb 6.6 oz)   23 62.6 kg (138 lb)   23 62.6 kg (138 lb 0.1 oz)   23 66.1 kg (145 lb 11.2 oz)   23 67.6 kg (149 lb)   22 59 kg (130 lb)   22 59 kg (130 lb)     Physical exam:  General appearance: Pleasant, sitting in an electric scooter   Eyes: conjunctivae and extraocular motions are normal. Pupils are equal, round, and reactive to light. No lid lag, no stare.  HENT: neck no JVD, no bruits, no thyromegaly, no palpable nodules  Cardiovascular: regular rhythm, no murmurs, distal pulses palpable, bilateral lower extremities edema, B/L feet boot  Respiratory: chest clear, no rales, no rhonchi  Neurologic: No resting tremor; upper left  extremity paresis   Psychiatric: affect and judgment normal     DATA REVIEW  Lab Results   Component Value Date    JORDON 10.3 (H) 06/11/2024    JORDON 9.8 05/02/2024    JORDON 10.5 (H) 04/17/2024    JORDON 8.7 (L) 04/07/2024    ALBUMIN 4.1 06/11/2024    ALT 12 04/05/2024    PHOS 3.8 06/11/2024    PTHI 70 (H) 01/29/2024    PTHI 43 01/30/2023    PTHI 96 (H) 08/12/2021    PTHI 30 04/15/2013    AST 22 04/05/2024    BILITOTAL 0.2 04/05/2024    CR 0.89 05/02/2024    CR 0.68 04/17/2024    CR 0.67 04/07/2024     05/02/2024    TSH 4.81 (H) 06/11/2024    ALKPHOS 89 04/05/2024    VITDT 28 (L) 04/15/2013    HGB 9.8 (L) 04/17/2024       TSH   Date Value Ref Range Status   06/11/2024 4.81 (H) 0.30 - 4.20 uIU/mL Final   11/05/2021 1.33 0.40 - 4.00 mU/L Final   06/30/2021 191.68 (H) 0.40 - 4.00 mU/L Final     I, Neli Bean, was present with the resident who participated in the service and in the documentation of the note.  I have verified the history and personally performed the physical exam and medical decision making.  I agree with the assessment and plan of care as documented in the note.     Neli Bean MD    The longitudinal plan of care for the diagnosis(es)/condition(s) as documented were addressed during this visit. Due to the added complexity in care, I will continue to support Marylee in the subsequent management and with ongoing continuity of care.

## 2024-12-02 NOTE — NURSING NOTE
"Chief Complaint   Patient presents with    Thyroid Problem   Vital signs:      BP: (!) 145/74 Pulse: 87     SpO2: 100 %          Estimated body mass index is 30.41 kg/m  as calculated from the following:    Height as of 4/5/24: 1.727 m (5' 8\").    Weight as of 9/24/24: 90.7 kg (200 lb).        "

## 2024-12-02 NOTE — LETTER
12/2/2024       RE: Marylee Woods  [address]  [facility] [address]     Dear Colleague,    Thank you for referring your patient, Marylee Woods, to the [facility] [place] at [facility]. Please see a copy of my visit note below.    11/26/24 7:55 AM  PATIENT LAB/IMAGING STATUS : No pending lab orders           Endocrinology [facility]       Follow up for Osteoporosis and post-ablative hypothyroidism    ASSESSMENT/PLAN:     1.  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  Recent TSH has been elevated with subsequent adjustments in Levothyroxine doses. Will recheck today    2.  Osteoporosis, severe, based on history of femoral fractures after falling from a standing height, in 2009 and 2018 and after a wheelchair accident in 11/2023.  Left total hip T-score -6 in 3/27/2024, risk factors history of smoking, menopause, aging, Graves disease. Plan for Evenity injections with consolidative treatment following.    Recommendations:  Lab check today TSH, T4, PTH, Vitamin D, BMP, phos, albumin  Start Evenity    Return to clinic in 1 year, follow up with Dr. Flowers for Evenity monitoring in April as scheduled.    Orders Placed This Encounter   Procedures     TSH     T4 free     Basic metabolic panel     Phosphorus     Albumin level     Parathyroid Hormone Intact     25 Hydroxyvitamin D2 and D3     The patient was seen and discussed with attending, Dr. Bean, who is in agreement with plan.    Abby Lemus, DO  Internal Medicine PGY3    =========================================================================================    Interval history  Had been dealing with elevated TSH this summer, increased Levothyroxine to 112 mcg. Has chronic constipation, no significant weight change, brittle nails but hair normal. Falls asleep well but medications sometimes keep her up. Endorses  cold intolerance.     Had been planned to start Evenity injections monthly at last visit, however, initially had some confusion about how to schedule these. She wanted to check with her neurologist prior to starting this medication and they have ok'ed it. She was also delayed as she spent time in [place] with her daughter who recently had a child. Still interested in treatment with Evenity, no new cardiovascular disease or events.   - weight stable, no new falls, PT 2x/week    Most recent DXA scan 3/27/24 T-score -6 left total hip    Osteoporosis medication since last visit: last Reclast infusion 3/23, tolerated this well  received Reclast infusion in 3/23, 2/22  Treatment with Fosamax was recommended by her orthopedic surgeon, in 2018. As per patient, she started Fosamax in 2020 and she took it for approximately 1 year.    Calcium supplement ~600 mg daily  Vitamin D: in her calcium  ~800 international unit(s) daily, plus MVI  Physical activity: step therapy 2x/week  Dietary calcium: 1-2x/week    Prior fractures:  2009 Right nondisplaced intertrochanteric fracture.  2013 Left tibial and fibular fracture   10/2018 Transverse fracture of the distal left femoral diaphysis, after falling from standing height.  6/2020 Left tibial plateau fracture   11/2023 right distal femoral fracture and left malleolus fracture after the wheelchair felt on a side.  Orthopedics following and managing conservatively.    Hypothyroidism  Has been takign LT4 112 mcg in am as directed    HPI  1.  Post ablative hypothyroidism, which ensured following radioiodine treatment for Graves' disease  Marylee follows up with Dr. Redman for MS, at [facility].  The patient has had the thyroid hormone levels checked quite frequently, through her neurology clinic, given prior history of treatment with [place], in [id number].  She was formally diagnosed with seropositive Graves' disease in October 2020.  Thyroid-stimulating  immunoglobulins were positive at 6.4. Subsequently, she underwent treatment with 9.3 mCi I-131, on 2/9/2021.  Prior to treatment, the scan revealed a 24-hour uptake of 87.5%, with a computer estimated weight of 42.2 g.  The radiotracer uptake was uniform, with the exception of the caudal region in the inferior left thyroid lobe, which corresponded to a nodule biopsied in 2017.     In 2017, she was incidentally diagnosed with thyroid nodules during a CAT scan.  The thyroid ultrasound from 9/7/2017 revealed multiple thyroid nodules.  The left inferior #4 thyroid nodule, measuring 2.2 cm, was benign on biopsy. Overall, the nodules had a benign ultrasound appearance, mostly spongiform.  The biopsied nodule had an internal macrocalcification, minimal internal vascularity, but no definite features suggestive of malignancy.  The patient has no prior history of radiation exposure or a family history of thyroid disease.  On the follow-up thyroid ultrasound from 1/14/2021, there were no significant changes of the thyroid nodules.    For the last 6-7 years, she has been using a wheelchair, prior to hip fracture in 11/23, she was able to stand and pivot, is working with  PT and homehealth to get back to standing and taking a few steps.    She remains on treatment with Pepcid, at night.  Marylee smoked since age 17, with intermittent breaks, generally less than half a pack a day.  She quit smoking in 2022.   She has both upper and lower dentures.   The left foot has a heel sore and wears a boot.     Past Medical History  Past Medical History:   Diagnosis Date     Gastro-oesophageal reflux disease      Multiple sclerosis (H) 1990s   May 2020, diagnosed with Covid  Depression  Pancytopenia dating back to at least 2014  Hyperlipidemia  Lung nodules  Candida esophagitis  UTI  R intertrochanteric femoral fracture 2009   Left tibial and fibular fracture in 2013  Left tibial plateau fracture June 2020   Left femoral fracture in  10/2018, after falling from standing height (above the knee)    Medications    Current Outpatient Medications:      acetaminophen (TYLENOL) 500 MG tablet, Take 1-2 tablets (500-1,000 mg) by mouth every 8 hours as needed for mild pain or fever (greater than 101 degrees), Disp: , Rfl:      acyclovir (ZOVIRAX) 400 MG tablet, TAKE 1 TABLET BY MOUTH EVERY 12 HOURS, Disp: 180 tablet, Rfl: 2     amLODIPine (NORVASC) 5 MG tablet, Take 1 tablet (5 mg) by mouth daily, Disp: 90 tablet, Rfl: 1     Armodafinil 200 MG TABS, Take 200 mg by mouth every morning, Disp: , Rfl:      baclofen (LIORESAL) 10 MG tablet, Take 40 mg by mouth at bedtime In addition to morning and noon doses, Disp: , Rfl:      baclofen (LIORESAL) 10 MG tablet, Take 10 mg by mouth 2 times daily 0800 and noon (in addition to bedtime dose), Disp: , Rfl:      Calcium Carbonate Antacid 600 MG CHEW, Take 600 mg by mouth daily, Disp: , Rfl:      DULoxetine (CYMBALTA) 20 MG capsule, TAKE 1 CAPSULE BY MOUTH ONCE DAILY ALONG  WITH  A  60  MG  TABLET  FOR  A  TOTAL  DOSE  OF  80  MG  DAILY, Disp: 90 capsule, Rfl: 0     DULoxetine (CYMBALTA) 60 MG capsule, Take 1 capsule by mouth once daily, Disp: 90 capsule, Rfl: 3     famotidine (PEPCID) 40 MG tablet, TAKE 1 TABLET BY MOUTH AT BEDTIME, Disp: 90 tablet, Rfl: 2     gabapentin (NEURONTIN) 300 MG capsule, Take 3 capsules (900 mg) by mouth 2 times daily, Disp: , Rfl:      latanoprost (XALATAN) 0.005 % ophthalmic solution, INSTILL 1 DROP INTO BOTH EYES EVERY DAY AS DIRECTED PT WILL NEED TO BE SEEN FOR FUTURE REFILLS, Disp: , Rfl:      levothyroxine (SYNTHROID/LEVOTHROID) 112 MCG tablet, Take 1 tablet (112 mcg) by mouth daily, Disp: 90 tablet, Rfl: 3     losartan (COZAAR) 50 MG tablet, Take 1 tablet (50 mg) by mouth 2 times daily, Disp: 180 tablet, Rfl: 4     Multiple Vitamins-Minerals (MULTIPLE VITAMINS/WOMENS PO), Take by mouth daily, Disp: , Rfl:      nitroFURantoin macrocrystal-monohydrate (MACROBID) 100 MG capsule, Take 1  capsule (100 mg) by mouth daily, Disp: 90 capsule, Rfl: 4     polyethylene glycol (MIRALAX) 17 GM/Dose powder, Take 17 g by mouth daily as needed for constipation, Disp: , Rfl:      YUVAFEM 10 MCG TABS vaginal tablet, Place 10 mcg vaginally twice a week, Disp: , Rfl:     Allergies  Allergies   Allergen Reactions     Bactrim [Sulfamethoxazole-Trimethoprim] Hallucination     Hydrochlorothiazide      Hyponatremia     Sulfamethizole      Amantadine      hallucinations     Budesonide      Budesonide-Formoterol Fumarate Rash     Family History  family history includes Cancer in her maternal grandfather; Heart Disease in her father, maternal grandmother, mother, and paternal grandfather.  She has positive family of autoimmune disease. Her mom has lupus and hypopituitarism. No family history of thyroid disease.  Maternal grandfather had throat cancer. Type 1 diabetes - maternal grandmother. Diabetes - maternal aunt.     Social History  , 2 children.  She denies drinking alcohol or using illicit drugs.  History of long-term smoking.  Social History     Tobacco Use     Smoking status: Former     Current packs/day: 0.00     Types: Cigarettes     Quit date: 2022     Years since quittin.9     Passive exposure: Current     Smokeless tobacco: Never     Tobacco comments:     Once in a while    Vaping Use     Vaping status: Never Used   Substance Use Topics     Alcohol use: Yes     Alcohol/week: 0.0 standard drinks of alcohol     Comment: occasional      Drug use: No      Physical Exam   LMP 2004 (Approximate)     Wt Readings from Last 10 Encounters:   24 90.7 kg (200 lb)   24 91.1 kg (200 lb 12.8 oz)   24 69.4 kg (153 lb)   24 58.7 kg (129 lb 6.6 oz)   23 62.6 kg (138 lb)   23 62.6 kg (138 lb 0.1 oz)   23 66.1 kg (145 lb 11.2 oz)   23 67.6 kg (149 lb)   22 59 kg (130 lb)   22 59 kg (130 lb)     Physical exam:  General appearance: Pleasant, sitting in  an electric scooter   Eyes: conjunctivae and extraocular motions are normal. Pupils are equal, round, and reactive to light. No lid lag, no stare.  HENT: neck no JVD, no bruits, no thyromegaly, no palpable nodules  Cardiovascular: regular rhythm, no murmurs, distal pulses palpable, bilateral lower extremities edema, B/L feet boot  Respiratory: chest clear, no rales, no rhonchi  Neurologic: No resting tremor; upper left extremity paresis   Psychiatric: affect and judgment normal     DATA REVIEW  Lab Results   Component Value Date    JORDON 10.3 (H) 06/11/2024    JORDON 9.8 05/02/2024    JORDON 10.5 (H) 04/17/2024    JORDON 8.7 (L) 04/07/2024    ALBUMIN 4.1 06/11/2024    ALT 12 04/05/2024    PHOS 3.8 06/11/2024    PTHI 70 (H) 01/29/2024    PTHI 43 01/30/2023    PTHI 96 (H) 08/12/2021    PTHI 30 04/15/2013    AST 22 04/05/2024    BILITOTAL 0.2 04/05/2024    CR 0.89 05/02/2024    CR 0.68 04/17/2024    CR 0.67 04/07/2024     05/02/2024    TSH 4.81 (H) 06/11/2024    ALKPHOS 89 04/05/2024    VITDT 28 (L) 04/15/2013    HGB 9.8 (L) 04/17/2024       TSH   Date Value Ref Range Status   06/11/2024 4.81 (H) 0.30 - 4.20 uIU/mL Final   11/05/2021 1.33 0.40 - 4.00 mU/L Final   06/30/2021 191.68 (H) 0.40 - 4.00 mU/L Final     I, Neli Bean, was present with the resident who participated in the service and in the documentation of the note.  I have verified the history and personally performed the physical exam and medical decision making.  I agree with the assessment and plan of care as documented in the note.     Neli Bean MD    The longitudinal plan of care for the diagnosis(es)/condition(s) as documented were addressed during this visit. Due to the added complexity in care, I will continue to support Marylee in the subsequent management and with ongoing continuity of care.      Again, thank you for allowing me to participate in the care of your patient.      Sincerely,    Neli Bean MD

## 2024-12-02 NOTE — PROGRESS NOTES
=========================================================================================    ASSESSMENT/PLAN:     1.  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  Recent TSH has been elevated, in the context of missed levothyroxine dosages.  The patient confirms she has been taking levothyroxine as instructed since being recently discharged from the hospital.  The plan is to recheck the thyroid hormone levels in 1 month and adjust the dose of levothyroxine accordingly.    2.  Osteoporosis, severe, based on history of femoral fractures after falling from a standing height, in 2009 and 2018 and after a wheelchair accident in 11/2023.    The DXA scan from 11/21 confirmed very low T-scores.  Risk factors for osteoporosis identified: Postmenopausal status, sedentary lifestyle due to MS, treatment with PPI, hyperthyroidism, smoking.  Screening for celiac disease was negative.   From 6/2020 until 2021, the patient was medicated with Fosamax (for ~1 year).  She received 2 dosages of Reclast in 2/22 and 3/23.    Recommendations:  Follow-up BMP, albumin, vitamin D and PTH levels  Adjust the dose of vitamin D and calcium based on the lab results.   Transition to Evenity for one year, then resume Reclast.  Counseled the patient on the questionable increased risk of cardiovascular disease.    2.  Borderline cortisol on the dexamethasone suppression test from December 2021  Salivary cortisol was unreliable due to mucosal bleeding.   Clinically, the patient does not endorse definite signs or symptoms suggestive of Cushing syndrome.   Plan:   Pursue an 8 mg dexamethasone suppression test.     4. Thyroid nodules, with no ultrasound features suggestive of malignancy  Clinical exam today was unremarkable.    No orders of the defined types were placed in this encounter.    =========================================================================================    1.  Post  ablative hypothyroidism, which ensured following radioiodine treatment for Graves' disease  Marylee follows up with Dr. Redman for MS, at [facility].  The patient has had the thyroid hormone levels checked quite frequently, through her neurology clinic, given prior history of treatment with [place], in [id number].  She was formally diagnosed with seropositive Graves' disease in October 2020.  Thyroid-stimulating immunoglobulins were positive at 6.4. Subsequently, she underwent treatment with 9.3 mCi I-131, on 2/9/2021.  Prior to treatment, the scan revealed a 24-hour uptake of 87.5%, with a computer estimated weight of 42.2 g.  The radiotracer uptake was uniform, with the exception of the caudal region in the inferior left thyroid lobe, which corresponded to a nodule biopsied in 2017.     In 2017, she was incidentally diagnosed with thyroid nodules during a CAT scan.  The thyroid ultrasound from 9/7/2017 revealed multiple thyroid nodules.  The left inferior #4 thyroid nodule, measuring 2.2 cm, was benign on biopsy. Overall, the nodules had a benign ultrasound appearance, mostly spongiform.  The biopsied nodule had an internal macrocalcification, minimal internal vascularity, but no definite features suggestive of malignancy.  The patient has no prior history of radiation exposure or a family history of thyroid disease.  On the follow-up thyroid ultrasound from 1/14/2021, there were no significant changes of the thyroid nodules.    Reports being compliant in taking 112 mcg levothyroxine as instructed.  On the most recent lab work from 6/11/2024, TSH was 4.81, free T4 was 1.1.    She continues to endorse fatigue, constipation and cold intolerance.      2. Osteoporosis     Prior fractures:  2009 Right nondisplaced intertrochanteric fracture - S/P right hip replacement.  2013 Left tibial and fibular fracture   10/2018 Transverse fracture of the distal left femoral diaphysis, after falling from  standing height.  6/2020 Left tibial plateau fracture   11/2023 right distal femoral fracture and left malleolus fracture after the wheelchair felt on a side.      Treatment with Fosamax was recommended by her orthopedic surgeon, in 2018. As per patient, she started Fosamax in 2020 and she took it for approximately 1 year.  For the last 7-8 years, she has been using a wheelchair.    She remains on treatment with Pepcid, at night.  Marylee smoked since age 17, with intermittent breaks, generally less than half a pack a day.  She quit smoking in 2022.   She has both upper and lower dentures.   The left foot has a heel sore and wears a boot.     Her  confirms she has been taking the following calcium and vitamin D supplements:  600 mg calcium +800 units vitamin D BID. The calcium is  >4 hrs from levothyroxine. Takes 2 tablets at night.   A multivitamin which contains 1000 units vitamin D daily. Was told to change to prenatal MVI.   She used to take vitamin D 5000 units daily and this was discontinued by me in 2023, given the vitamin D toxic levels.  Trying to have cheese in her diet. Doesn't drink milk.     11/5/2021 DXA  L2-L4 T score was -5.3, left femoral neck T score was -5.1, total left femur T score -6, 33% distal radius T score -3.5.    The patient received 2 dosages of Reclast, on 2/24/2022 and 3/24/23.     3/17/24 DXA   Lumbar spine T-score -4.8, left femoral neck T-score -5.1, left total hip T-score -6, 33% distal radius T-score -4.1.  Compared with the prior study, there was a decrease of 13.2% of bone mineral density at the lumbar spine and 10.3% at the radius.    In June 2024 she was evaluated by Dr. Flowers and started on treatment with Evenity.    Most recent PTH was 43, in January 2023, when vitamin D level was 119.  Following this lab work, the high-dose of 5000 units vitamin D was discontinued.  6/11/2024:  Calcium 10.3 (8.8-10.2), vitamin D 57, magnesium 1.8, phosphorus 3.8, albumin  4.1.  On prior lab work, PTH has been mildly elevated in the context of CKD stage II, not associated with hypercalcemia.  On lab work, the calcium levels have been quite variable.  F/up apt with Dr. Flowers is scheduled in April 2024.     3. Adrenal nodularity, incidentally noticed on the abdominal CT images from June 2021, small and bilateral.     Pertinent labs reviewed:  2/9/24 1 mg dexamethasone suppressed cortisol 1.5   1/6/24 cortisol 12.2 at 9:38 am   11/5/21   ACTH<10, cortisol 7.9  2/4/22 salivary cortisol 0.624 (0.022-0.254) - contaminated by blood   12/21 negative tissue transglutaminase antibodies, 1 mg dexamethasone suppression test revealed a cortisol level 2.2, with an appropriate dexamethasone level.    2011 cortisol 23.2     RA  Systemic symptoms: fatigue; weight stable. Appetite is good.   Longstanding dry eyes - uses eye drops   Otolaryngeal symptoms: some dysphagia - intermittent and longstanding   Cardiovascular symptoms: no CP or palpitations   Pulmonary symptoms: No pulmonary symptoms.  Gastrointestinal symptoms: constipation relived by miralax   Genitourinary symptoms: No genitourinary symptoms.  Endocrine symptoms: longstanding cold intolerance  Hematologic symptoms: No hematologic symptoms.  Musculoskeletal symptoms: occasional hip pain with movement     Skin symptoms: dry skin, hair loss; no easy bruising noted; no stretch marks, no hirsutism or acne    Past Medical History  Past Medical History:   Diagnosis Date    Gastro-oesophageal reflux disease     Multiple sclerosis (H) 1990s   May 2020, diagnosed with Covid  Depression  Pancytopenia dating back to at least 2014  Hyperlipidemia  Lung nodules  Candida esophagitis  UTI  R intertrochanteric femoral fracture 2009   Left tibial and fibular fracture in 2013  Left tibial plateau fracture June 2020   Left femoral fracture in 10/2018, after falling from standing height (above the knee)    Medications    Current Outpatient Medications:      acetaminophen (TYLENOL) 500 MG tablet, Take 1-2 tablets (500-1,000 mg) by mouth every 8 hours as needed for mild pain or fever (greater than 101 degrees), Disp: , Rfl:     acyclovir (ZOVIRAX) 400 MG tablet, TAKE 1 TABLET BY MOUTH EVERY 12 HOURS, Disp: 180 tablet, Rfl: 2    amLODIPine (NORVASC) 5 MG tablet, Take 1 tablet (5 mg) by mouth daily, Disp: 90 tablet, Rfl: 1    Armodafinil 200 MG TABS, Take 200 mg by mouth every morning, Disp: , Rfl:     baclofen (LIORESAL) 10 MG tablet, Take 40 mg by mouth at bedtime In addition to morning and noon doses, Disp: , Rfl:     baclofen (LIORESAL) 10 MG tablet, Take 10 mg by mouth 2 times daily 0800 and noon (in addition to bedtime dose), Disp: , Rfl:     Calcium Carbonate Antacid 600 MG CHEW, Take 600 mg by mouth daily, Disp: , Rfl:     DULoxetine (CYMBALTA) 20 MG capsule, TAKE 1 CAPSULE BY MOUTH ONCE DAILY ALONG  WITH  A  60  MG  TABLET  FOR  A  TOTAL  DOSE  OF  80  MG  DAILY, Disp: 90 capsule, Rfl: 0    DULoxetine (CYMBALTA) 60 MG capsule, Take 1 capsule by mouth once daily, Disp: 90 capsule, Rfl: 3    famotidine (PEPCID) 40 MG tablet, TAKE 1 TABLET BY MOUTH AT BEDTIME, Disp: 90 tablet, Rfl: 2    gabapentin (NEURONTIN) 300 MG capsule, Take 3 capsules (900 mg) by mouth 2 times daily, Disp: , Rfl:     latanoprost (XALATAN) 0.005 % ophthalmic solution, INSTILL 1 DROP INTO BOTH EYES EVERY DAY AS DIRECTED PT WILL NEED TO BE SEEN FOR FUTURE REFILLS, Disp: , Rfl:     levothyroxine (SYNTHROID/LEVOTHROID) 112 MCG tablet, Take 1 tablet (112 mcg) by mouth daily, Disp: 90 tablet, Rfl: 3    losartan (COZAAR) 50 MG tablet, Take 1 tablet (50 mg) by mouth 2 times daily, Disp: 180 tablet, Rfl: 4    Multiple Vitamins-Minerals (MULTIPLE VITAMINS/WOMENS PO), Take by mouth daily, Disp: , Rfl:     nitroFURantoin macrocrystal-monohydrate (MACROBID) 100 MG capsule, Take 1 capsule (100 mg) by mouth daily, Disp: 90 capsule, Rfl: 4    polyethylene glycol (MIRALAX) 17 GM/Dose powder, Take 17 g by mouth  daily as needed for constipation, Disp: , Rfl:     YUVAFEM 10 MCG TABS vaginal tablet, Place 10 mcg vaginally twice a week, Disp: , Rfl:     Allergies  Allergies   Allergen Reactions    Bactrim [Sulfamethoxazole-Trimethoprim] Hallucination    Hydrochlorothiazide      Hyponatremia    Sulfamethizole     Amantadine      hallucinations    Budesonide     Budesonide-Formoterol Fumarate Rash     Family History  family history includes Cancer in her maternal grandfather; Heart Disease in her father, maternal grandmother, mother, and paternal grandfather.  She has positive family of autoimmune disease. Her mom has lupus and hypopituitarism. No family history of thyroid disease.  Maternal grandfather had throat cancer. Type 1 diabetes - maternal grandmother. Diabetes - maternal aunt.     Social History  , 2 children.  She denies drinking alcohol or using illicit drugs.  History of long-term smoking.  Social History     Tobacco Use    Smoking status: Former     Current packs/day: 0.00     Types: Cigarettes     Quit date: 2022     Years since quittin.9     Passive exposure: Current    Smokeless tobacco: Never    Tobacco comments:     Once in a while    Vaping Use    Vaping status: Never Used   Substance Use Topics    Alcohol use: Yes     Alcohol/week: 0.0 standard drinks of alcohol     Comment: occasional     Drug use: No      Physical Exam     Wt Readings from Last 10 Encounters:   24 90.7 kg (200 lb)   24 91.1 kg (200 lb 12.8 oz)   24 69.4 kg (153 lb)   24 58.7 kg (129 lb 6.6 oz)   23 62.6 kg (138 lb)   23 62.6 kg (138 lb 0.1 oz)   23 66.1 kg (145 lb 11.2 oz)   23 67.6 kg (149 lb)   22 59 kg (130 lb)   22 59 kg (130 lb)     BP Readings from Last 6 Encounters:   10/04/24 132/62   24 128/70   08/15/24 137/76   24 (!) 162/91   24 (!) 169/90   04/10/24 (!) 142/77     LMP 2004 (Approximate)   There is no height or weight on file to  calculate BMI.     General appearance: Pleasant, lying in an electric scooter   Eyes: conjunctivae and extraocular motions are normal. Pupils are equal, round, and reactive to light. No lid lag, no stare.  HENT: neck no JVD, no bruits, no thyromegaly, no palpable nodules  Cardiovascular: regular rhythm, no murmurs, distal pulses palpable, bilateral lower extremities edema, left foot boot  Respiratory: chest clear, no rales, no rhonchi  Neurologic: No resting tremor; upper left extremity paresis   Psychiatric: affect and judgment normal   Skin: No abdominal striae, no hirsutism or acne    DATA REVIEW    TSH   Date Value Ref Range Status   06/11/2024 4.81 (H) 0.30 - 4.20 uIU/mL Final   11/05/2021 1.33 0.40 - 4.00 mU/L Final   06/30/2021 191.68 (H) 0.40 - 4.00 mU/L Final     T4 Total   Date Value Ref Range Status   02/28/2011 7.6 4.7 - 13.3 ug/dl Final     T4 Free   Date Value Ref Range Status   06/30/2021 0.67 (L) 0.76 - 1.46 ng/dL Final     Free T4   Date Value Ref Range Status   06/11/2024 1.10 0.90 - 1.70 ng/dL Final     45 minutes spent on the date of the encounter doing chart review, history and exam, documentation and further activities as noted above.

## 2024-12-04 LAB
DEPRECATED CALCIDIOL+CALCIFEROL SERPL-MC: <66 UG/L (ref 20–75)
VITAMIN D2 SERPL-MCNC: <5 UG/L
VITAMIN D3 SERPL-MCNC: 61 UG/L

## 2024-12-11 ENCOUNTER — TELEPHONE (OUTPATIENT)
Dept: FAMILY MEDICINE | Facility: CLINIC | Age: 69
End: 2024-12-11
Payer: MEDICARE

## 2024-12-11 NOTE — TELEPHONE ENCOUNTER
Patient see at the [facility] for [facility] on 12/2/24 and received a letter indicating that her blood test was positive for TB. She is fatigued, but unsure if related and no other symptoms.    Please advise. Thank you.    Myriam Gonzalez, RN, BSN, PHN  [facility]

## 2024-12-11 NOTE — TELEPHONE ENCOUNTER
Patient called and advised. Scheduled for same day slot tomorrow 9:50 arrival time. Advised to wear well fitting mask.    Myriam Gonzalez RN, BSN, PHN  [facility]

## 2024-12-11 NOTE — TELEPHONE ENCOUNTER
Please schedule her for an in person office visit with one of my colleagues or me if I have space.    This doesn't sound like active TB but is more likely latent TB found on routine screening, so not urgent urgent but should be seen within a week or so ideally.      Please explain to her the difference between active and latent TB to help calm her nerves about this result.      Dr. Carolina Pulliam MD / [facility]

## 2024-12-12 ENCOUNTER — ANCILLARY PROCEDURE (OUTPATIENT)
Dept: GENERAL RADIOLOGY | Facility: CLINIC | Age: 69
End: 2024-12-12
Attending: STUDENT IN AN ORGANIZED HEALTH CARE EDUCATION/TRAINING PROGRAM
Payer: MEDICARE

## 2024-12-12 ENCOUNTER — OFFICE VISIT (OUTPATIENT)
Dept: FAMILY MEDICINE | Facility: CLINIC | Age: 69
End: 2024-12-12
Payer: MEDICARE

## 2024-12-12 VITALS
OXYGEN SATURATION: 95 % | HEIGHT: 68 IN | RESPIRATION RATE: 16 BRPM | BODY MASS INDEX: 30.41 KG/M2 | HEART RATE: 91 BPM | DIASTOLIC BLOOD PRESSURE: 70 MMHG | TEMPERATURE: 97.2 F | SYSTOLIC BLOOD PRESSURE: 136 MMHG

## 2024-12-12 DIAGNOSIS — E03.8 OTHER SPECIFIED HYPOTHYROIDISM: ICD-10-CM

## 2024-12-12 DIAGNOSIS — D63.8 ANEMIA OF CHRONIC DISEASE: ICD-10-CM

## 2024-12-12 DIAGNOSIS — R53.83 OTHER FATIGUE: ICD-10-CM

## 2024-12-12 DIAGNOSIS — R76.12 POSITIVE QUANTIFERON-TB GOLD TEST: Primary | ICD-10-CM

## 2024-12-12 DIAGNOSIS — R76.12 POSITIVE QUANTIFERON-TB GOLD TEST: ICD-10-CM

## 2024-12-12 LAB
BASOPHILS # BLD AUTO: 0 10E3/UL (ref 0–0.2)
BASOPHILS NFR BLD AUTO: 1 %
EOSINOPHIL # BLD AUTO: 0.2 10E3/UL (ref 0–0.7)
EOSINOPHIL NFR BLD AUTO: 5 %
ERYTHROCYTE [DISTWIDTH] IN BLOOD BY AUTOMATED COUNT: 12.8 % (ref 10–15)
HCT VFR BLD AUTO: 32.2 % (ref 35–47)
HGB BLD-MCNC: 10 G/DL (ref 11.7–15.7)
IMM GRANULOCYTES # BLD: 0 10E3/UL
IMM GRANULOCYTES NFR BLD: 0 %
LYMPHOCYTES # BLD AUTO: 0.9 10E3/UL (ref 0.8–5.3)
LYMPHOCYTES NFR BLD AUTO: 23 %
MCH RBC QN AUTO: 28.7 PG (ref 26.5–33)
MCHC RBC AUTO-ENTMCNC: 31.1 G/DL (ref 31.5–36.5)
MCV RBC AUTO: 92 FL (ref 78–100)
MONOCYTES # BLD AUTO: 0.3 10E3/UL (ref 0–1.3)
MONOCYTES NFR BLD AUTO: 7 %
NEUTROPHILS # BLD AUTO: 2.7 10E3/UL (ref 1.6–8.3)
NEUTROPHILS NFR BLD AUTO: 64 %
PLATELET # BLD AUTO: 218 10E3/UL (ref 150–450)
RBC # BLD AUTO: 3.49 10E6/UL (ref 3.8–5.2)
WBC # BLD AUTO: 4.1 10E3/UL (ref 4–11)

## 2024-12-12 ASSESSMENT — PAIN SCALES - GENERAL: PAINLEVEL_OUTOF10: NO PAIN (0)

## 2024-12-12 NOTE — PROGRESS NOTES
Assessment & Plan     Positive QuantiFERON-TB Gold test  Positive TB Quant Gold test on 12/2. Discussed active versus latent TB versus possible false positive testing with patient. Answered all questions.     No active symptoms of TB. Vitals signs and exam within normal limits/unremarkable. Suspect false positive or latent TB. Today, recheck Quantiferon TB test and check CXR.     - Quantiferon TB Gold Plus; Future  - XR Chest 2 Views; Future  - Quantiferon TB Gold Plus    Other fatigue  Anemia of chronic disease  Hypothyroidism  MS  History of hypothyroidism managed well with recent normal thyroid levels and anemia of chronic disease. She is worried that anemia may have worsened recently with more fatigue but does acknowledge that it could be due to MS. Check CBC with diff today to ensure stable. If stable, can continue to monitor with Nephrology. If fatigue severely worsening can discuss with team: PCP, Neurologist, and Nephrologist.         The longitudinal plan of care for the diagnosis(es)/condition(s) as documented were addressed during this visit. Due to the added complexity in care, I will continue to support Marylee in the subsequent management and with ongoing continuity of care.      Subjective   Marylee is a 69 year old, presenting for the following health issues:  Blood Culture Positive (positive QuantiFERON )        12/12/2024     9:51 AM   Additional Questions   Roomed by Sheng Iqbal   Accompanied by      History of Present Illness       Reason for visit:  Certified Letter saying blood tests taken December 2 showed I tested positive for TB  Symptom onset:  1-3 days ago  Symptoms include:  None   She is taking medications regularly.     Patient had positive QuantiFERON gold test on 12/2/24.   No history of TB or recent or past exposure to TB.   Denies any symptoms of active TB disease: cough, SOB, fevers, night sweats, weight loss.   She has a history of anemia and wonders if it could have  "worsened with more fatigue over the last few months. Does have history of hypothyroidism with recent normal thyroid levels and normal BMP, parathyroid hormone, phosphorous, and Vitamin D, completed through endocrinology.   Otherwise no concerns and feels well.   Very concerned about this test result.                     Objective    /70   Pulse 91   Temp 97.2  F (36.2  C) (Temporal)   Resp 16   Ht 1.727 m (5' 8\")   LMP 01/31/2004 (Approximate)   SpO2 95%   BMI 30.41 kg/m    Body mass index is 30.41 kg/m .  Physical Exam   GENERAL: alert and no distress  EYES: Eyes grossly normal to inspection, PERRL and conjunctivae and sclerae normal  HENT: ear canals and TM's normal, nose and mouth without ulcers or lesions  NECK: no adenopathy, no asymmetry, masses, or scars  RESP: lungs clear to auscultation - no rales, rhonchi or wheezes  CV: regular rate and rhythm, normal S1 S2, no S3 or S4, no murmur, click or rub, no peripheral edema  NEURO: Normal strength and tone, mentation intact and speech normal  PSYCH: mentation appears normal, affect normal/bright            Signed Electronically by: Arben Pham PA-C    "

## 2024-12-13 ENCOUNTER — TELEPHONE (OUTPATIENT)
Dept: FAMILY MEDICINE | Facility: CLINIC | Age: 69
End: 2024-12-13

## 2024-12-13 NOTE — TELEPHONE ENCOUNTER
Test Results    Contacts       Contact Date/Time Type Contact Phone/Fax    12/13/2024 11:55 AM CST Phone (Incoming) Woods, Marylee (Self) [phone] (M)        Who ordered the test:  Arben HOROWITZ    Type of test: Lab    Date of test:  12/12/24    Where was the test performed:  [facility]    What are your questions/concerns?:  Patient was seen in clinic yesterday for + TB Gold blood test.  Patient concerned about active TB.  CXR done and Quantiferon TB Gold Plus in process    Could [facility] this information [facility] you [facility] or would you prefer to receive a phone call?:   Patient would prefer a phone call   Okay to leave a detailed message?: Yes at Cell number on file:    Telephone Information:   Mobile [phone]     Message routed to Arben HOROWITZ for review  / plan.    Luz Mathews RN  [facility]

## 2024-12-13 NOTE — TELEPHONE ENCOUNTER
CXR was negative and did not show any signs of active TB. Quant gold test still pending to decide if this is latent infection versus false positive test. We will reach out once we have results.     Thanks!   HTM

## 2024-12-16 ENCOUNTER — TELEPHONE (OUTPATIENT)
Dept: FAMILY MEDICINE | Facility: CLINIC | Age: 69
End: 2024-12-16
Payer: MEDICARE

## 2024-12-16 NOTE — TELEPHONE ENCOUNTER
----- Message from Arben Pham sent at 12/16/2024  8:47 AM CST -----  Team - please call patient with results.    Quantiferon gold test was negative, meaning the first test was likely a false negative. No further treatment or action needed.     Anemia is stable/unchanged. From review of EMR - Dating back until at least 2011.  Thought to be related to MS medications as well. If any rectal/other bleeding, severe new fatigue, or other concerning symptoms, recommend follow-up. Can continue to monitor with Dr. Pulliam.     Let me know if she has any questions/concerns.     HTM

## 2024-12-16 NOTE — TELEPHONE ENCOUNTER
Left message on answering machine for patient to call clinic triage.   Please call us to review this result message.  HEIDY Stock

## 2024-12-22 DIAGNOSIS — I10 HYPERTENSION GOAL BP (BLOOD PRESSURE) < 140/90: ICD-10-CM

## 2024-12-22 RX ORDER — AMLODIPINE BESYLATE 5 MG/1
5 TABLET ORAL DAILY
Qty: 90 TABLET | Refills: 2 | Status: SHIPPED | OUTPATIENT
Start: 2024-12-22

## 2025-02-10 ENCOUNTER — TELEPHONE (OUTPATIENT)
Dept: FAMILY MEDICINE | Facility: CLINIC | Age: 70
End: 2025-02-10
Payer: MEDICARE

## 2025-02-10 NOTE — TELEPHONE ENCOUNTER
Forms/Letter Request    Type of form/letter: OTHER: practitioner standard written order       Do we have the form/letter: Yes: placed in care team 3 providers sign folder    Who is the form from? [employer] (if other please explain)    Where did/will the form [employer] from? form was faxed in    When is form/letter needed by: n/a    How would you like the form/letter returned: Fax : [phone]

## 2025-02-13 ENCOUNTER — TRANSFERRED RECORDS (OUTPATIENT)
Dept: HEALTH INFORMATION MANAGEMENT | Facility: CLINIC | Age: 70
End: 2025-02-13
Payer: MEDICARE

## 2025-02-16 DIAGNOSIS — F33.1 MODERATE EPISODE OF RECURRENT MAJOR DEPRESSIVE DISORDER (H): ICD-10-CM

## 2025-02-17 RX ORDER — DULOXETIN HYDROCHLORIDE 20 MG/1
CAPSULE, DELAYED RELEASE ORAL
Qty: 90 CAPSULE | Refills: 4 | Status: SHIPPED | OUTPATIENT
Start: 2025-02-17

## 2025-02-19 ENCOUNTER — OFFICE VISIT (OUTPATIENT)
Dept: FAMILY MEDICINE | Facility: CLINIC | Age: 70
End: 2025-02-19
Payer: MEDICARE

## 2025-02-19 VITALS
HEART RATE: 91 BPM | DIASTOLIC BLOOD PRESSURE: 72 MMHG | SYSTOLIC BLOOD PRESSURE: 134 MMHG | BODY MASS INDEX: 30.41 KG/M2 | RESPIRATION RATE: 21 BRPM | TEMPERATURE: 97.2 F | HEIGHT: 68 IN | OXYGEN SATURATION: 97 %

## 2025-02-19 DIAGNOSIS — Z87.891 HISTORY OF TOBACCO USE, PRESENTING HAZARDS TO HEALTH: ICD-10-CM

## 2025-02-19 DIAGNOSIS — Z17.0 MALIGNANT NEOPLASM OF UPPER-OUTER QUADRANT OF LEFT BREAST IN FEMALE, ESTROGEN RECEPTOR POSITIVE (H): ICD-10-CM

## 2025-02-19 DIAGNOSIS — D63.8 ANEMIA OF CHRONIC DISEASE: ICD-10-CM

## 2025-02-19 DIAGNOSIS — F33.1 MODERATE EPISODE OF RECURRENT MAJOR DEPRESSIVE DISORDER (H): ICD-10-CM

## 2025-02-19 DIAGNOSIS — G35 MS (MULTIPLE SCLEROSIS) (H): Primary | ICD-10-CM

## 2025-02-19 DIAGNOSIS — G47.39 TREATMENT-EMERGENT CENTRAL SLEEP APNEA: ICD-10-CM

## 2025-02-19 DIAGNOSIS — E66.811 CLASS 1 OBESITY WITH SERIOUS COMORBIDITY AND BODY MASS INDEX (BMI) OF 30.0 TO 30.9 IN ADULT, UNSPECIFIED OBESITY TYPE: ICD-10-CM

## 2025-02-19 DIAGNOSIS — C50.412 MALIGNANT NEOPLASM OF UPPER-OUTER QUADRANT OF LEFT BREAST IN FEMALE, ESTROGEN RECEPTOR POSITIVE (H): ICD-10-CM

## 2025-02-19 PROCEDURE — 96127 BRIEF EMOTIONAL/BEHAV ASSMT: CPT | Performed by: FAMILY MEDICINE

## 2025-02-19 PROCEDURE — 99215 OFFICE O/P EST HI 40 MIN: CPT | Performed by: FAMILY MEDICINE

## 2025-02-19 PROCEDURE — G2211 COMPLEX E/M VISIT ADD ON: HCPCS | Performed by: FAMILY MEDICINE

## 2025-02-19 RX ORDER — DULOXETIN HYDROCHLORIDE 60 MG/1
CAPSULE, DELAYED RELEASE ORAL
Qty: 90 CAPSULE | Refills: 4 | Status: SHIPPED | OUTPATIENT
Start: 2025-02-19

## 2025-02-19 ASSESSMENT — COLUMBIA-SUICIDE SEVERITY RATING SCALE - C-SSRS
6. HAVE YOU EVER DONE ANYTHING, STARTED TO DO ANYTHING, OR PREPARED TO DO ANYTHING TO END YOUR LIFE?: NO
2. IN THE PAST MONTH, HAVE YOU ACTUALLY HAD ANY THOUGHTS OF KILLING YOURSELF?: NO
1. WITHIN THE PAST MONTH, HAVE YOU WISHED YOU WERE DEAD OR WISHED YOU COULD GO TO SLEEP AND NOT WAKE UP?: NO

## 2025-02-19 ASSESSMENT — PATIENT HEALTH QUESTIONNAIRE - PHQ9: SUM OF ALL RESPONSES TO PHQ QUESTIONS 1-9: 9

## 2025-02-19 ASSESSMENT — PAIN SCALES - GENERAL: PAINLEVEL_OUTOF10: NO PAIN (0)

## 2025-02-19 NOTE — PROGRESS NOTES
Assessment & Plan     Problem List as of 2/19/2025 Reviewed: 12/12/2024 12:26 PM by Arben Pham PA-C            Noted       Medium    1. Anemia of chronic disease 11/27/2011     Overview Addendum 2/19/2025  5:55 PM by Carolina Pluliam MD      02/19/2025 A/P:  Stable. Dating back until at least 2011.  Thought to be related to MS medications as well.         2. Class 1 obesity with serious comorbidity and body mass index (BMI) of 30.0 to 30.9 in adult, unspecified obesity type 2/19/2025     Overview Signed 2/19/2025  5:56 PM by Carolina Pulliam MD      02/19/2025 A/P:  Would like nutrition therapy.  Referred to [facility] program.          Relevant Orders     Adult Comprehensive Weight Management  Referral    3. Major depression in complete remission (H) 1/8/2015     Overview Addendum 2/19/2025  5:55 PM by Carolina Pulliam MD      02/19/2025 A/P:  Didn't call therapists I gave her in April and Oct last year  Things a bit better now  Will refer to our care coordinator  (thank you Mayra!) to help do any intermediate therapy over the phone, and help her get access to more resources.    10/04/2024 A/P:  Worsened a bit, and stress of illness and in marriage adds to.  Referred to therapy again (gave her names from April visit)  Also referred to  mental health services  Also discussed asking her physical therapy to help her be able to get from bed to chair independently in the morning    2024: On duloxetine, stable          Relevant Medications     DULoxetine (CYMBALTA) 60 MG capsule     Other Relevant Orders     Primary Care - Care Coordination Referral    4. Malignant neoplasm of upper-outer quadrant of left breast in female, estrogen receptor positive (H) 2/19/2025     Overview Signed 2/19/2025  5:53 PM by Carolina Pulliam MD      02/19/2025 A/P:  Follows with Dr. Martinez, reviewed last notes.  Lumpectomy 3/31/2022.  Rx 6/2022 (tolerated poorly)  Follow with  oncology every 6-12 months for 5 yrs         5. MS (multiple sclerosis) (H) - dx'd in 1993, followed by Dr. Redman [facility] 4/12/2013     Overview Addendum 2/19/2025  5:45 PM by Carolina Pulliam MD      02/19/2025 A/P:  Follows with Dr. Redman  Starting disease modifying therapy infusion next week.  A little anxious.  But MS is flaring so needs to treat.  Lots of pain and stiffness.  I referred for massage therapy at [facility] and also wrote letter so HSA hopefully covers it.          Relevant Orders     Massage Therapy Referral    6. Treatment-emergent central sleep apnea and Hypersomnia - followed by Dr Say Lopez 2024 3/28/2022     Overview Addendum 2/19/2025  5:55 PM by Carolina Pulliam MD      02/19/2025 A/P:  Hasn't been wearing mask.  Suspect this as cause of fatigue.  Will start.    2024: reviewed last sleep note. using EPAP rather than CPAP. Started Nuvigil for daytime sleepiness  2022: titration PSG with CPAP ineffective, success on ASV at default settings                    Depression Screening Follow Up        2/19/2025     3:11 PM   PHQ   PHQ-9 Total Score 9   Q9: Thoughts of better off dead/self-harm past 2 weeks Several days                 2/19/2025     5:58 PM   C-SSRS (Brief [place])   Within the last month, have you wished you were dead or wished you could go to sleep and not wake up? No   Within the last month, have you had any actual thoughts of killing yourself? No   Within the last month, have you ever done anything, started to do anything, or prepared to do anything to end your life? No       Follow Up Actions Taken  Crisis resource information provided in the After Visit Summary    Discussed the following ways the patient can remain in a safe environment.        Subjective Marylee is a 69 year old, presenting for the following health issues:  Anemia        2/19/2025     3:09 PM   Additional Questions   Roomed by  "Cortney RUVALCABA   Accompanied by      History of Present Illness       Reason for visit:  Follow-up visit. Extreme fatigue.Possible other issues.    She eats 0-1 servings of fruits and vegetables daily.She consumes 1 sweetened beverage(s) daily.She exercises with enough effort to increase her heart rate 9 or less minutes per day.  She exercises with enough effort to increase her heart rate 3 or less days per week.   She is taking medications regularly.           Objective    /72 (BP Location: Right arm, Patient Position: Sitting, Cuff Size: Adult Regular)   Pulse 91   Temp 97.2  F (36.2  C) (Temporal)   Resp 21   Ht 1.727 m (5' 8\")   LMP 01/31/2004 (Approximate)   SpO2 97%   BMI 30.41 kg/m    Body mass index is 30.41 kg/m .  Physical Exam   GENERAL: alert and no distress          Signed Electronically by: Carolina Pulliam MD      The longitudinal plan of care for the diagnosis(es)/condition(s) as documented were addressed during this visit. Due to the added complexity in care, I will continue to support Marylee in the subsequent management and with ongoing continuity of care.  "

## 2025-02-19 NOTE — LETTER
February 19, 2025      Marylee Woods  [address]  [place] [address]        To Whom It May Concern,     Marylee Woods 1955 is a patient under my care.  She has multiple sclerosis and would medically benefit from massage therapy.  Please allow her HSA/FSA funds to be used for this or cover it under her insurance if possible.        Sincerely,        Carolina Pulliam MD    Electronically signed

## 2025-02-20 ENCOUNTER — PATIENT OUTREACH (OUTPATIENT)
Dept: CARE COORDINATION | Facility: CLINIC | Age: 70
End: 2025-02-20
Payer: MEDICARE

## 2025-02-20 NOTE — LETTER
[facility] CARE COORDINATION  [address]  [place] [address]    February 20, 2025    Marylee Woods  [address]  [place] [address]      Dear Marylee,    I am a clinic care coordinator who works with Carolina Pulliam MD with the [facility]. I recently tried to call and was unable to reach you. Below is a description of clinic care coordination and how I can further assist you.       The clinic care coordination team is made up of a registered nurse, , financial resource worker and community health worker who understand the health care system. The goal of clinic care coordination is to help you manage your health and improve access to the health care system. Our team works alongside your provider to assist you in determining your health and social needs. We can help you obtain health care and community resources, providing you with necessary information and education. We can work with you through any barriers and develop a care plan that helps coordinate and strengthen the communication between you and your care team.  Our services are voluntary and are offered without charge to you personally.    Please feel free to contact me with any questions or concerns regarding care coordination and what we can offer.      We are focused on providing you with the highest-quality healthcare experience possible.    Sincerely,     Mayra Brown [facility]  Social Work Care Coordinator  [facility]  [email or web address]  Office: [phone]  Gender pronouns: she/her

## 2025-02-20 NOTE — PROGRESS NOTES
Clinic Care Coordination Contact  [facility]/Voicemail    Clinical Data: Care Coordinator Outreach    Outreach Documentation Number of Outreach Attempt   2/20/2025   3:15 PM 1       Left message on patient's voicemail with call back information and requested return call.      Plan: Care Coordinator sent care coordination introduction letter on 2/20/25 via [place]. Care Coordinator will try to reach patient again in 1-2 business days.    HUYEN John  Social Work Care Coordinator  [facility]  [email or web address]  Office: [phone]  Gender pronouns: she/her

## 2025-03-03 ENCOUNTER — TELEPHONE (OUTPATIENT)
Dept: FAMILY MEDICINE | Facility: CLINIC | Age: 70
End: 2025-03-03
Payer: MEDICARE

## 2025-03-03 NOTE — TELEPHONE ENCOUNTER
General Call    Reason for Call: Diet    What are your questions or concerns: My household has the norovirus. It's been about 10 days. I want to know what I should eat and drink. Writer reviewed foods to the best of her knowledge and encouraged drinking fluids. Pt verbalized understanding.    Date of last appointment with provider: 2/19/25    Kenyon RUVALCABA RN  [facility]

## 2025-03-06 ENCOUNTER — LAB (OUTPATIENT)
Dept: LAB | Facility: CLINIC | Age: 70
End: 2025-03-06
Payer: MEDICARE

## 2025-03-06 LAB
ALBUMIN UR-MCNC: 100 MG/DL
AMORPH CRY #/AREA URNS HPF: ABNORMAL /HPF
APPEARANCE UR: ABNORMAL
BACTERIA #/AREA URNS HPF: ABNORMAL /HPF
BILIRUB UR QL STRIP: ABNORMAL
COLOR UR AUTO: YELLOW
GLUCOSE UR STRIP-MCNC: NEGATIVE MG/DL
HGB UR QL STRIP: NEGATIVE
KETONES UR STRIP-MCNC: NEGATIVE MG/DL
LEUKOCYTE ESTERASE UR QL STRIP: ABNORMAL
NITRATE UR QL: NEGATIVE
PH UR STRIP: 5.5 [PH] (ref 5–7)
RBC #/AREA URNS AUTO: ABNORMAL /HPF
SP GR UR STRIP: 1.02 (ref 1–1.03)
SQUAMOUS #/AREA URNS AUTO: ABNORMAL /LPF
UROBILINOGEN UR STRIP-ACNC: 0.2 E.U./DL
WBC #/AREA URNS AUTO: ABNORMAL /HPF

## 2025-03-08 LAB — BACTERIA UR CULT: ABNORMAL

## 2025-03-09 ENCOUNTER — HOSPITAL ENCOUNTER (INPATIENT)
Facility: CLINIC | Age: 70
DRG: 690 | End: 2025-03-09
Attending: EMERGENCY MEDICINE | Admitting: HOSPITALIST
Payer: MEDICARE

## 2025-03-09 ENCOUNTER — APPOINTMENT (OUTPATIENT)
Dept: CT IMAGING | Facility: CLINIC | Age: 70
DRG: 690 | End: 2025-03-09
Attending: EMERGENCY MEDICINE
Payer: MEDICARE

## 2025-03-09 DIAGNOSIS — K52.9 ACUTE COLITIS: ICD-10-CM

## 2025-03-09 DIAGNOSIS — B95.2 UTI (URINARY TRACT INFECTION) DUE TO ENTEROCOCCUS: ICD-10-CM

## 2025-03-09 DIAGNOSIS — N39.0 UTI (URINARY TRACT INFECTION) DUE TO ENTEROCOCCUS: ICD-10-CM

## 2025-03-09 DIAGNOSIS — N10 ACUTE PYELONEPHRITIS: ICD-10-CM

## 2025-03-09 LAB
ALBUMIN SERPL BCG-MCNC: 4.2 G/DL (ref 3.5–5.2)
ALBUMIN UR-MCNC: 10 MG/DL
ALP SERPL-CCNC: 69 U/L (ref 40–150)
ALT SERPL W P-5'-P-CCNC: 14 U/L (ref 0–50)
ANION GAP SERPL CALCULATED.3IONS-SCNC: 15 MMOL/L (ref 7–15)
APPEARANCE UR: CLEAR
AST SERPL W P-5'-P-CCNC: 21 U/L (ref 0–45)
BASOPHILS # BLD AUTO: 0 10E3/UL (ref 0–0.2)
BASOPHILS NFR BLD AUTO: 0 %
BILIRUB SERPL-MCNC: 0.5 MG/DL
BILIRUB UR QL STRIP: NEGATIVE
BUN SERPL-MCNC: 15.8 MG/DL (ref 8–23)
CALCIUM SERPL-MCNC: 9.6 MG/DL (ref 8.8–10.4)
CHLORIDE SERPL-SCNC: 101 MMOL/L (ref 98–107)
COLOR UR AUTO: ABNORMAL
CREAT SERPL-MCNC: 0.67 MG/DL (ref 0.51–0.95)
EGFRCR SERPLBLD CKD-EPI 2021: >90 ML/MIN/1.73M2
EOSINOPHIL # BLD AUTO: 0.1 10E3/UL (ref 0–0.7)
EOSINOPHIL NFR BLD AUTO: 1 %
ERYTHROCYTE [DISTWIDTH] IN BLOOD BY AUTOMATED COUNT: 13.2 % (ref 10–15)
GLUCOSE SERPL-MCNC: 102 MG/DL (ref 70–99)
GLUCOSE UR STRIP-MCNC: NEGATIVE MG/DL
HCO3 SERPL-SCNC: 22 MMOL/L (ref 22–29)
HCT VFR BLD AUTO: 35.7 % (ref 35–47)
HGB BLD-MCNC: 11.9 G/DL (ref 11.7–15.7)
HGB UR QL STRIP: NEGATIVE
HOLD SPECIMEN: NORMAL
IMM GRANULOCYTES # BLD: 0 10E3/UL
IMM GRANULOCYTES NFR BLD: 0 %
KETONES UR STRIP-MCNC: 20 MG/DL
LACTATE SERPL-SCNC: 0.9 MMOL/L (ref 0.7–2)
LEUKOCYTE ESTERASE UR QL STRIP: NEGATIVE
LIPASE SERPL-CCNC: 48 U/L (ref 13–60)
LYMPHOCYTES # BLD AUTO: 0.5 10E3/UL (ref 0.8–5.3)
LYMPHOCYTES NFR BLD AUTO: 7 %
MCH RBC QN AUTO: 28.4 PG (ref 26.5–33)
MCHC RBC AUTO-ENTMCNC: 33.3 G/DL (ref 31.5–36.5)
MCV RBC AUTO: 85 FL (ref 78–100)
MONOCYTES # BLD AUTO: 0.3 10E3/UL (ref 0–1.3)
MONOCYTES NFR BLD AUTO: 4 %
MUCOUS THREADS #/AREA URNS LPF: PRESENT /LPF
NEUTROPHILS # BLD AUTO: 5.9 10E3/UL (ref 1.6–8.3)
NEUTROPHILS NFR BLD AUTO: 87 %
NITRATE UR QL: NEGATIVE
NRBC # BLD AUTO: 0 10E3/UL
NRBC BLD AUTO-RTO: 0 /100
PH UR STRIP: 5.5 [PH] (ref 5–7)
PLATELET # BLD AUTO: 255 10E3/UL (ref 150–450)
POTASSIUM SERPL-SCNC: 3.5 MMOL/L (ref 3.4–5.3)
PROT SERPL-MCNC: 7.7 G/DL (ref 6.4–8.3)
RBC # BLD AUTO: 4.19 10E6/UL (ref 3.8–5.2)
RBC URINE: 1 /HPF
SODIUM SERPL-SCNC: 138 MMOL/L (ref 135–145)
SP GR UR STRIP: 1.02 (ref 1–1.03)
SQUAMOUS EPITHELIAL: 7 /HPF
UROBILINOGEN UR STRIP-MCNC: NORMAL MG/DL
WBC # BLD AUTO: 6.9 10E3/UL (ref 4–11)
WBC URINE: 5 /HPF

## 2025-03-09 PROCEDURE — 81001 URINALYSIS AUTO W/SCOPE: CPT | Performed by: EMERGENCY MEDICINE

## 2025-03-09 PROCEDURE — 96365 THER/PROPH/DIAG IV INF INIT: CPT

## 2025-03-09 PROCEDURE — 250N000009 HC RX 250: Performed by: EMERGENCY MEDICINE

## 2025-03-09 PROCEDURE — 96361 HYDRATE IV INFUSION ADD-ON: CPT

## 2025-03-09 PROCEDURE — 83605 ASSAY OF LACTIC ACID: CPT | Performed by: EMERGENCY MEDICINE

## 2025-03-09 PROCEDURE — 250N000013 HC RX MED GY IP 250 OP 250 PS 637: Performed by: NURSE PRACTITIONER

## 2025-03-09 PROCEDURE — 250N000011 HC RX IP 250 OP 636: Performed by: EMERGENCY MEDICINE

## 2025-03-09 PROCEDURE — 96375 TX/PRO/DX INJ NEW DRUG ADDON: CPT

## 2025-03-09 PROCEDURE — 99285 EMERGENCY DEPT VISIT HI MDM: CPT | Mod: 25

## 2025-03-09 PROCEDURE — G0378 HOSPITAL OBSERVATION PER HR: HCPCS

## 2025-03-09 PROCEDURE — 250N000011 HC RX IP 250 OP 636: Performed by: NURSE PRACTITIONER

## 2025-03-09 PROCEDURE — 250N000013 HC RX MED GY IP 250 OP 250 PS 637: Performed by: EMERGENCY MEDICINE

## 2025-03-09 PROCEDURE — 96376 TX/PRO/DX INJ SAME DRUG ADON: CPT

## 2025-03-09 PROCEDURE — 85025 COMPLETE CBC W/AUTO DIFF WBC: CPT | Performed by: EMERGENCY MEDICINE

## 2025-03-09 PROCEDURE — 74177 CT ABD & PELVIS W/CONTRAST: CPT

## 2025-03-09 PROCEDURE — 87086 URINE CULTURE/COLONY COUNT: CPT | Performed by: EMERGENCY MEDICINE

## 2025-03-09 PROCEDURE — 258N000003 HC RX IP 258 OP 636: Performed by: EMERGENCY MEDICINE

## 2025-03-09 PROCEDURE — 99222 1ST HOSP IP/OBS MODERATE 55: CPT | Performed by: NURSE PRACTITIONER

## 2025-03-09 PROCEDURE — 36415 COLL VENOUS BLD VENIPUNCTURE: CPT | Performed by: EMERGENCY MEDICINE

## 2025-03-09 PROCEDURE — 85041 AUTOMATED RBC COUNT: CPT | Performed by: EMERGENCY MEDICINE

## 2025-03-09 PROCEDURE — 87040 BLOOD CULTURE FOR BACTERIA: CPT | Performed by: EMERGENCY MEDICINE

## 2025-03-09 PROCEDURE — 250N000013 HC RX MED GY IP 250 OP 250 PS 637: Performed by: HOSPITALIST

## 2025-03-09 PROCEDURE — 258N000003 HC RX IP 258 OP 636: Performed by: NURSE PRACTITIONER

## 2025-03-09 PROCEDURE — 80053 COMPREHEN METABOLIC PANEL: CPT | Performed by: EMERGENCY MEDICINE

## 2025-03-09 PROCEDURE — 83690 ASSAY OF LIPASE: CPT | Performed by: EMERGENCY MEDICINE

## 2025-03-09 RX ORDER — AMLODIPINE BESYLATE 5 MG/1
5 TABLET ORAL DAILY
Status: DISCONTINUED | OUTPATIENT
Start: 2025-03-09 | End: 2025-03-14 | Stop reason: HOSPADM

## 2025-03-09 RX ORDER — ACETAMINOPHEN 500 MG
500-1000 TABLET ORAL EVERY 8 HOURS PRN
Status: DISCONTINUED | OUTPATIENT
Start: 2025-03-09 | End: 2025-03-14 | Stop reason: HOSPADM

## 2025-03-09 RX ORDER — IOPAMIDOL 755 MG/ML
101 INJECTION, SOLUTION INTRAVASCULAR ONCE
Status: COMPLETED | OUTPATIENT
Start: 2025-03-09 | End: 2025-03-09

## 2025-03-09 RX ORDER — ONDANSETRON 2 MG/ML
4 INJECTION INTRAMUSCULAR; INTRAVENOUS ONCE
Status: COMPLETED | OUTPATIENT
Start: 2025-03-09 | End: 2025-03-09

## 2025-03-09 RX ORDER — LATANOPROST 50 UG/ML
1 SOLUTION/ DROPS OPHTHALMIC AT BEDTIME
Status: DISCONTINUED | OUTPATIENT
Start: 2025-03-09 | End: 2025-03-14 | Stop reason: HOSPADM

## 2025-03-09 RX ORDER — BACLOFEN 10 MG/1
30 TABLET ORAL EVERY EVENING
Status: DISCONTINUED | OUTPATIENT
Start: 2025-03-09 | End: 2025-03-14 | Stop reason: HOSPADM

## 2025-03-09 RX ORDER — AMPICILLIN AND SULBACTAM 2; 1 G/1; G/1
3 INJECTION, POWDER, FOR SOLUTION INTRAMUSCULAR; INTRAVENOUS EVERY 6 HOURS
Status: DISCONTINUED | OUTPATIENT
Start: 2025-03-09 | End: 2025-03-10

## 2025-03-09 RX ORDER — BACLOFEN 10 MG/1
10 TABLET ORAL 2 TIMES DAILY
Status: DISCONTINUED | OUTPATIENT
Start: 2025-03-09 | End: 2025-03-09

## 2025-03-09 RX ORDER — LEVOTHYROXINE SODIUM 112 UG/1
112 TABLET ORAL DAILY
Status: DISCONTINUED | OUTPATIENT
Start: 2025-03-09 | End: 2025-03-14 | Stop reason: HOSPADM

## 2025-03-09 RX ORDER — ONDANSETRON 2 MG/ML
4 INJECTION INTRAMUSCULAR; INTRAVENOUS EVERY 6 HOURS PRN
Status: DISCONTINUED | OUTPATIENT
Start: 2025-03-09 | End: 2025-03-14 | Stop reason: HOSPADM

## 2025-03-09 RX ORDER — AMOXICILLIN 250 MG
2 CAPSULE ORAL 2 TIMES DAILY PRN
Status: DISCONTINUED | OUTPATIENT
Start: 2025-03-09 | End: 2025-03-14 | Stop reason: HOSPADM

## 2025-03-09 RX ORDER — HYDRALAZINE HYDROCHLORIDE 20 MG/ML
10 INJECTION INTRAMUSCULAR; INTRAVENOUS EVERY 4 HOURS PRN
Status: DISCONTINUED | OUTPATIENT
Start: 2025-03-09 | End: 2025-03-14 | Stop reason: HOSPADM

## 2025-03-09 RX ORDER — SODIUM CHLORIDE, SODIUM LACTATE, POTASSIUM CHLORIDE, CALCIUM CHLORIDE 600; 310; 30; 20 MG/100ML; MG/100ML; MG/100ML; MG/100ML
INJECTION, SOLUTION INTRAVENOUS CONTINUOUS
Status: DISCONTINUED | OUTPATIENT
Start: 2025-03-09 | End: 2025-03-10

## 2025-03-09 RX ORDER — BACLOFEN 10 MG/1
20 TABLET ORAL EVERY MORNING
Status: DISCONTINUED | OUTPATIENT
Start: 2025-03-10 | End: 2025-03-14 | Stop reason: HOSPADM

## 2025-03-09 RX ORDER — AMOXICILLIN 250 MG
1 CAPSULE ORAL 2 TIMES DAILY PRN
Status: DISCONTINUED | OUTPATIENT
Start: 2025-03-09 | End: 2025-03-14 | Stop reason: HOSPADM

## 2025-03-09 RX ORDER — HYDRALAZINE HYDROCHLORIDE 10 MG/1
10 TABLET, FILM COATED ORAL EVERY 4 HOURS PRN
Status: DISCONTINUED | OUTPATIENT
Start: 2025-03-09 | End: 2025-03-14 | Stop reason: HOSPADM

## 2025-03-09 RX ORDER — PROCHLORPERAZINE MALEATE 5 MG/1
5 TABLET ORAL EVERY 6 HOURS PRN
Status: DISCONTINUED | OUTPATIENT
Start: 2025-03-09 | End: 2025-03-14 | Stop reason: HOSPADM

## 2025-03-09 RX ORDER — LOSARTAN POTASSIUM 50 MG/1
50 TABLET ORAL 2 TIMES DAILY
Status: DISCONTINUED | OUTPATIENT
Start: 2025-03-09 | End: 2025-03-14 | Stop reason: HOSPADM

## 2025-03-09 RX ORDER — ACETAMINOPHEN 650 MG/1
650 SUPPOSITORY RECTAL EVERY 4 HOURS PRN
Status: DISCONTINUED | OUTPATIENT
Start: 2025-03-09 | End: 2025-03-09

## 2025-03-09 RX ORDER — DULOXETIN HYDROCHLORIDE 20 MG/1
20 CAPSULE, DELAYED RELEASE ORAL DAILY
Status: DISCONTINUED | OUTPATIENT
Start: 2025-03-09 | End: 2025-03-14 | Stop reason: HOSPADM

## 2025-03-09 RX ORDER — FAMOTIDINE 20 MG/1
40 TABLET, FILM COATED ORAL AT BEDTIME
Status: DISCONTINUED | OUTPATIENT
Start: 2025-03-09 | End: 2025-03-14 | Stop reason: HOSPADM

## 2025-03-09 RX ORDER — ACETAMINOPHEN 325 MG/1
650 TABLET ORAL ONCE
Status: COMPLETED | OUTPATIENT
Start: 2025-03-09 | End: 2025-03-09

## 2025-03-09 RX ORDER — DULOXETIN HYDROCHLORIDE 60 MG/1
60 CAPSULE, DELAYED RELEASE ORAL DAILY
Status: DISCONTINUED | OUTPATIENT
Start: 2025-03-09 | End: 2025-03-14 | Stop reason: HOSPADM

## 2025-03-09 RX ORDER — ARMODAFINIL 200 MG/1
200 TABLET ORAL EVERY MORNING
Status: DISCONTINUED | OUTPATIENT
Start: 2025-03-10 | End: 2025-03-09

## 2025-03-09 RX ORDER — SODIUM CHLORIDE, SODIUM LACTATE, POTASSIUM CHLORIDE, CALCIUM CHLORIDE 600; 310; 30; 20 MG/100ML; MG/100ML; MG/100ML; MG/100ML
INJECTION, SOLUTION INTRAVENOUS CONTINUOUS
Status: DISCONTINUED | OUTPATIENT
Start: 2025-03-09 | End: 2025-03-14

## 2025-03-09 RX ORDER — ACETAMINOPHEN 325 MG/1
650 TABLET ORAL EVERY 4 HOURS PRN
Status: DISCONTINUED | OUTPATIENT
Start: 2025-03-09 | End: 2025-03-09

## 2025-03-09 RX ORDER — ONDANSETRON 4 MG/1
4 TABLET, ORALLY DISINTEGRATING ORAL EVERY 6 HOURS PRN
Status: DISCONTINUED | OUTPATIENT
Start: 2025-03-09 | End: 2025-03-14 | Stop reason: HOSPADM

## 2025-03-09 RX ORDER — GABAPENTIN 300 MG/1
900 CAPSULE ORAL 2 TIMES DAILY
Status: DISCONTINUED | OUTPATIENT
Start: 2025-03-09 | End: 2025-03-14 | Stop reason: HOSPADM

## 2025-03-09 RX ORDER — AMPICILLIN AND SULBACTAM 2; 1 G/1; G/1
3 INJECTION, POWDER, FOR SOLUTION INTRAMUSCULAR; INTRAVENOUS ONCE
Status: COMPLETED | OUTPATIENT
Start: 2025-03-09 | End: 2025-03-09

## 2025-03-09 RX ADMIN — IOPAMIDOL 101 ML: 755 INJECTION, SOLUTION INTRAVENOUS at 12:01

## 2025-03-09 RX ADMIN — ONDANSETRON 4 MG: 2 INJECTION, SOLUTION INTRAMUSCULAR; INTRAVENOUS at 09:24

## 2025-03-09 RX ADMIN — AMLODIPINE BESYLATE 5 MG: 5 TABLET ORAL at 21:00

## 2025-03-09 RX ADMIN — ACETAMINOPHEN 650 MG: 325 TABLET, FILM COATED ORAL at 12:26

## 2025-03-09 RX ADMIN — FAMOTIDINE 40 MG: 20 TABLET, FILM COATED ORAL at 21:01

## 2025-03-09 RX ADMIN — DULOXETINE HYDROCHLORIDE 20 MG: 20 CAPSULE, DELAYED RELEASE ORAL at 21:01

## 2025-03-09 RX ADMIN — SODIUM CHLORIDE 1000 ML: 0.9 INJECTION, SOLUTION INTRAVENOUS at 09:24

## 2025-03-09 RX ADMIN — GABAPENTIN 900 MG: 300 CAPSULE ORAL at 20:59

## 2025-03-09 RX ADMIN — AMPICILLIN SODIUM AND SULBACTAM SODIUM 3 G: 2; 1 INJECTION, POWDER, FOR SOLUTION INTRAMUSCULAR; INTRAVENOUS at 20:59

## 2025-03-09 RX ADMIN — LATANOPROST 1 DROP: 50 SOLUTION OPHTHALMIC at 21:01

## 2025-03-09 RX ADMIN — LEVOTHYROXINE SODIUM 112 MCG: 112 TABLET ORAL at 20:59

## 2025-03-09 RX ADMIN — BACLOFEN 30 MG: 10 TABLET ORAL at 21:00

## 2025-03-09 RX ADMIN — LOSARTAN POTASSIUM 50 MG: 50 TABLET, FILM COATED ORAL at 21:00

## 2025-03-09 RX ADMIN — AMPICILLIN SODIUM AND SULBACTAM SODIUM 3 G: 2; 1 INJECTION, POWDER, FOR SOLUTION INTRAMUSCULAR; INTRAVENOUS at 09:57

## 2025-03-09 RX ADMIN — SODIUM CHLORIDE 69 ML: 9 INJECTION, SOLUTION INTRAVENOUS at 12:01

## 2025-03-09 RX ADMIN — DULOXETINE HYDROCHLORIDE 60 MG: 60 CAPSULE, DELAYED RELEASE ORAL at 21:00

## 2025-03-09 RX ADMIN — SODIUM CHLORIDE, SODIUM LACTATE, POTASSIUM CHLORIDE, AND CALCIUM CHLORIDE: .6; .31; .03; .02 INJECTION, SOLUTION INTRAVENOUS at 21:02

## 2025-03-09 ASSESSMENT — ACTIVITIES OF DAILY LIVING (ADL)
ADLS_ACUITY_SCORE: 67
ADLS_ACUITY_SCORE: 71
ADLS_ACUITY_SCORE: 73
ADLS_ACUITY_SCORE: 73
ADLS_ACUITY_SCORE: 71
ADLS_ACUITY_SCORE: 73
ADLS_ACUITY_SCORE: 71

## 2025-03-09 NOTE — PHARMACY-ADMISSION MEDICATION HISTORY
Pharmacy Intern Admission Medication History    Addendum:  Med list updated to reflect the proper dosing instructions for baclofen. Previously 2 different instructions were listed (10 BID listed in addition to the correct dosing listed below).  Jude Ramirez [facility] on 3/9/2025 at 7:56 PM      Admission medication history is complete. The information provided in this note is only as accurate as the sources available at the time of the update.    Information Source(s): Patient and CareEverywhere/SureScripts via in-person    Pertinent Information:  -Patient starting Amoxicillin antibiotic course on 03/07/2025 for 5 days. She stated taking 1 tablet on Friday and 1 tablet this morning.  -Pt is planing to hold her Macrobid dose until she is done with her amoxicillin.    Changes made to PTA medication list:  Added: None  Deleted:   Duplicated baclofen 10 mg tablet  Yuvafem 10 mcg tablet  Changed:   Baclofen dosage regimen 10 mg BID--> 20 mg in the morning and 30 mg in the evening.    Allergies reviewed with patient and updates made in EHR: yes    Medication History Completed By: Buck Aguiar 3/9/2025 10:32 AM    [facility] Med List   Medication Sig Note Last Dose/Taking   acetaminophen (TYLENOL) 500 MG tablet Take 1-2 tablets (500-1,000 mg) by mouth every 8 hours as needed for mild pain or fever (greater than 101 degrees)  Taking As Needed   acyclovir (ZOVIRAX) 400 MG tablet TAKE 1 TABLET BY MOUTH EVERY 12 HOURS  3/7/2025 Evening   amLODIPine (NORVASC) 5 MG tablet Take 1 tablet by mouth once daily  3/7/2025 Morning   amoxicillin (AMOXIL) 500 MG capsule Take 1 capsule (500 mg) by mouth 3 times daily for 5 days.  3/9/2025 Morning   Armodafinil 200 MG TABS Take 200 mg by mouth every morning  3/7/2025 Morning   baclofen (LIORESAL) 10 MG tablet Take 2 tablets (20 mg) in the morning and 3 tablets (30 mg) in the evening.  3/6/2025 Evening   Calcium Carbonate Antacid 600 MG CHEW Take 600 mg by mouth daily  3/7/2025 Evening   CRANBERRY  PO Take by mouth. Take 1 capsule once daily.  3/7/2025 Morning   DULoxetine (CYMBALTA) 20 MG capsule TAKE 1 CAPSULE BY MOUTH ONCE DAILY ALONG  WITH  A  60  MG  TABLET  FOR  A  TOTAL  DOSE  OF  80  MG  DAILY  3/7/2025 Evening   famotidine (PEPCID) 40 MG tablet TAKE 1 TABLET BY MOUTH AT BEDTIME  3/7/2025 Bedtime   gabapentin (NEURONTIN) 300 MG capsule Take 3 capsules (900 mg) by mouth 2 times daily  3/7/2025 Bedtime   latanoprost (XALATAN) 0.005 % ophthalmic solution INSTILL 1 DROP INTO BOTH EYES EVERY DAY AS DIRECTED PT WILL NEED TO BE SEEN FOR FUTURE REFILLS  3/7/2025 Bedtime   levothyroxine (SYNTHROID/LEVOTHROID) 112 MCG tablet Take 1 tablet (112 mcg) by mouth daily  3/7/2025 Morning   losartan (COZAAR) 50 MG tablet Take 1 tablet (50 mg) by mouth 2 times daily  3/7/2025 Morning   Multiple Vitamins-Minerals (MULTIPLE VITAMINS/WOMENS PO) Take by mouth daily  3/7/2025 Evening   nitroFURantoin macrocrystal-monohydrate (MACROBID) 100 MG capsule Take 1 capsule (100 mg) by mouth daily 3/9/2025: Has not been taking it for few days.  Past Week

## 2025-03-09 NOTE — PROGRESS NOTES
RECEIVING UNIT ED HANDOFF REVIEW    ED Nurse Handoff Report was reviewed by: Prince Radha RN on March 9, 2025 at 3:34 PM

## 2025-03-09 NOTE — H&P
[facility]    History and Physical - Hospitalist Service       Date of Admission:  3/9/2025    Assessment & Plan      Marylee Woods is a 69 year old female with significant past medical history for multiple sclerosis wheelchair bound, malignant neoplasm of left breast status postlumpectomy in 2022, obstructive sleep apnea, hypertension, anemia, and depression who presented to the emergency department accompanied by her  due to weakness, fever, recent UTI diagnosed and recent norovirus. The hospitalist service requested to admit for further treatment evaluation.    Generalized weakness secondary to:  Acute cystitis  Colitis, suspect secondary to norovirus   -patient seen at pcp on 3/6 diagnosed with UTI  -Urine culture growth enterococcus faecalis and was initiated on amoxicillin.   -was continuing to have burning urination, LQ pain, with weakness, therefore CT abd/pelvis was completed in ER  -CT notable for suspected cystitis and colitis.  -Recently family with norovirus throughout household patient has been having nausea and diarrhea for 6 days reporting that today is the first day that she has not had profuse diarrhea.  -patient received 1 L NS bolus while in emergency department and will continue gentle IV hydration at 75 mL/h  -She also received Unasyn while in emergency department and will continue this for Enterococcus faecalis coverage in the setting of acute cystitis  -Will try to obtain enteric pathogen's panel if she is able to have bowel movement  -Can advance diet as tolerated  -Recheck labs in 3/10/2025    Multiple sclerosis  Wheelchair-bound  -follows with Dr. Redman   -receives Briumvi infusions  -continue baclofen and gabapentin     Hypertension  -continue amlodopine and losartan  -hydralazine for SBP >180    Depression  -continue cymbalta    History of malignant neoplasm left breast status postlumpectomy  -follows with Dr. Martinez oncology    Obstructive sleep  apnea  -continue armodafinil    Hypothyroidism  -continue levothyroxine     Glaucoma   -latanoprost         Diet: Regular Diet Adult    DVT Prophylaxis: Pneumatic Compression Devices  Verduzco Catheter: Not present  Lines: None     Cardiac Monitoring: None  Code Status:  Full code    Clinically Significant Risk Factors Present on Admission                   # Hypertension: Noted on problem list               # Financial/Environmental Concerns:           Disposition Plan     Medically Ready for Discharge: Anticipated Tomorrow         The patient's care was discussed with the Attending Physician, Dr. Sauceda .    Tootie Cleveland, DUNG APRN CNP  Hospitalist Service  [facility]  Securely message with [employer] (more info)  Text page via [facility] Paging/Directory     ______________________________________________________________________    Chief Complaint   Weakness, fever    History is obtained from the patient, electronic health record, emergency department physician, and patient's spouse    History of Present Illness   Marylee Woods is a 69 year old female with significant past medical history for multiple sclerosis wheelchair bound, malignant neoplasm of left breast status postlumpectomy in 2022, obstructive sleep apnea, hypertension, anemia, and depression who presented to the emergency department accompanied by her  due to weakness, fever, recent UTI diagnosed and recent norovirus. The hospitalist service requested to admit for further treatment evaluation.    Patient seen while still in emergency department with her  at bedside.  Patient is alert, oriented, very pleasant.  She endorses fever and weakness at home.  She denies any dizziness.  She reports episodes of shortness of breath awakening from sleep with hx of MARYLU. Denies chest pain.  She states she has had diarrhea and nausea over the past 5 to 6 days today being the first without having profuse diarrhea.  Norovirus is  reported to been throughout the household.  She also reports burning with urination with recent urinary tract infection diagnosed on amoxicillin.  She is wheelchair-bound at baseline lives with her  at home.      Past Medical History    Past Medical History:   Diagnosis Date    Acute cystitis without hematuria     Acute pain of right knee     Atypical ductal hyperplasia of left breast 02/2022    Candida esophagitis - 2019 (H)     CKD (chronic kidney disease) stage 3, GFR 30-59 ml/min (H)     Community acquired pneumonia, unspecified laterality     Cough, unspecified type 04/05/2024    Depression     Epigastric pain     Former tobacco use     Fracture of femur, distal, left, closed (H)     Gastro-oesophageal reflux disease     Graves disease     History of fracture of fibula     History of tibial fracture     HSV (herpes simplex virus) infection     Hyperlipidemia with target LDL less than 130     Hyponatremia     L tib fx s/p IM nailing     Lung nodules     Currently managed with follow up imaging by [facility] result Team.       Multiple sclerosis (H)     Osteoporosis     Postablative hypothyroidism        Past Surgical History   Past Surgical History:   Procedure Laterality Date    C/SECTION, LOW TRANSVERSE  1992    COLONOSCOPY  2007    COLONOSCOPY N/A 1/26/2017    Procedure: COMBINED COLONOSCOPY, SINGLE OR MULTIPLE BIOPSY/POLYPECTOMY BY BIOPSY;  Surgeon: Mayo Adkins MD, MD;  Location:  GI    ESOPHAGOSCOPY, GASTROSCOPY, DUODENOSCOPY (EGD), COMBINED  1/26/2017    Dr. Adkins [facility]    ESOPHAGOSCOPY, GASTROSCOPY, DUODENOSCOPY (EGD), COMBINED N/A 1/26/2017    Procedure: COMBINED ESOPHAGOSCOPY, GASTROSCOPY, DUODENOSCOPY (EGD), BIOPSY SINGLE OR MULTIPLE;  Surgeon: Mayo Adkins MD, MD;  Location:  GI    ESOPHAGOSCOPY, GASTROSCOPY, DUODENOSCOPY (EGD), COMBINED N/A 4/20/2023    Procedure: ESOPHAGOGASTRODUODENOSCOPY, WITH BIOPSY;  Surgeon: Cristina Zuniga MD;  Location:  GI    HIP  SURGERY  2009    femur ortho surgery    LAPAROSCOPIC CHOLECYSTECTOMY  6/24/2014    Procedure: LAPAROSCOPIC CHOLECYSTECTOMY;  Surgeon: Randy Bailey MD;  Location: [facility]    LUMPECTOMY BREAST Left 3/31/2022    Procedure: LEFT Radiofrequency Identification Seed-Localized Lumpectomy;  Surgeon: Amanda Liu MD;  Location: [facility] OR    OPEN REDUCTION INTERNAL FIXATION FEMUR DISTAL Left 11/1/2018    Procedure: OPEN REDUCTION INTERNAL FIXATION LEFT FEMUR DISTAL;  Surgeon: Jose Valadez MD;  Location: [facility]    OPEN REDUCTION INTERNAL FIXATION RODDING INTRAMEDULLARY TIBIA  4/14/2013    Procedure: OPEN REDUCTION INTERNAL FIXATION RODDING INTRAMEDULLARY TIBIA;;  Surgeon: Sajan Cast MD;  Location: [facility]    ORTHOPEDIC SURGERY  2009    surgery right upper femur fx near hip       Prior to Admission Medications   Prior to Admission Medications   Prescriptions Last Dose Informant Patient Reported? Taking?   Armodafinil 200 MG TABS 3/7/2025 Morning  Yes Yes   Sig: Take 200 mg by mouth every morning   CRANBERRY PO 3/7/2025 Morning  Yes Yes   Sig: Take by mouth. Take 1 capsule once daily.   Calcium Carbonate Antacid 600 MG CHEW 3/7/2025 Evening  Yes Yes   Sig: Take 600 mg by mouth daily   DULoxetine (CYMBALTA) 20 MG capsule 3/7/2025 Evening  No Yes   Sig: TAKE 1 CAPSULE BY MOUTH ONCE DAILY ALONG  WITH  A  60  MG  TABLET  FOR  A  TOTAL  DOSE  OF  80  MG  DAILY   DULoxetine (CYMBALTA) 60 MG capsule   No No   Sig: Take 1 capsule by mouth once daily   Patient taking differently: Take 1 capsule by mouth once daily, along with 20 mg for total of 80 mg daily dose.   Multiple Vitamins-Minerals (MULTIPLE VITAMINS/WOMENS PO) 3/7/2025 Evening  Yes Yes   Sig: Take by mouth daily   acetaminophen (TYLENOL) 500 MG tablet   No Yes   Sig: Take 1-2 tablets (500-1,000 mg) by mouth every 8 hours as needed for mild pain or fever (greater than 101 degrees)   acyclovir (ZOVIRAX) 400 MG tablet 3/7/2025 Evening  No Yes   Sig:  TAKE 1 TABLET BY MOUTH EVERY 12 HOURS   amLODIPine (NORVASC) 5 MG tablet 3/7/2025 Morning  No Yes   Sig: Take 1 tablet by mouth once daily   amoxicillin (AMOXIL) 500 MG capsule 3/9/2025 Morning  No Yes   Sig: Take 1 capsule (500 mg) by mouth 3 times daily for 5 days.   baclofen (LIORESAL) 10 MG tablet 3/6/2025 Evening  Yes Yes   Sig: Take 10 mg by mouth 2 times daily. Take 2 tablets (20 mg) in the morning and 3 tablets (30 mg) in the evening.   famotidine (PEPCID) 40 MG tablet 3/7/2025 Bedtime  No Yes   Sig: TAKE 1 TABLET BY MOUTH AT BEDTIME   gabapentin (NEURONTIN) 300 MG capsule 3/7/2025 Bedtime  No Yes   Sig: Take 3 capsules (900 mg) by mouth 2 times daily   latanoprost (XALATAN) 0.005 % ophthalmic solution 3/7/2025 Bedtime  Yes Yes   Sig: INSTILL 1 DROP INTO BOTH EYES EVERY DAY AS DIRECTED PT WILL NEED TO BE SEEN FOR FUTURE REFILLS   levothyroxine (SYNTHROID/LEVOTHROID) 112 MCG tablet 3/7/2025 Morning  No Yes   Sig: Take 1 tablet (112 mcg) by mouth daily   losartan (COZAAR) 50 MG tablet 3/7/2025 Morning  No Yes   Sig: Take 1 tablet (50 mg) by mouth 2 times daily   nitroFURantoin macrocrystal-monohydrate (MACROBID) 100 MG capsule Past Week  No Yes   Sig: Take 1 capsule (100 mg) by mouth daily   polyethylene glycol (MIRALAX) 17 GM/Dose powder Not Taking  No No   Sig: Take 17 g by mouth daily as needed for constipation   Patient not taking: Reported on 3/9/2025      Facility-Administered Medications: None        Social History   I have reviewed this patient's social history and updated it with pertinent information if needed.  Social History     Tobacco Use    Smoking status: Former     Current packs/day: 0.00     Types: Cigarettes     Quit date: 1/1/2022     Years since quitting: 3.1     Passive exposure: Current    Smokeless tobacco: Never    Tobacco comments:     Once in a while    Vaping Use    Vaping status: Never Used   Substance Use Topics    Alcohol use: Yes     Alcohol/week: 0.0 standard drinks of alcohol      Comment: occasional     Drug use: No        Physical Exam   Vital Signs: Temp: 98  F (36.7  C)   BP: (!) 168/78 Pulse: 93   Resp: 15 SpO2: 100 % O2 Device: None (Room air)    Weight: 0 lbs 0 oz    Physical Exam  Constitutional:       Appearance: Normal appearance.   Cardiovascular:      Rate and Rhythm: Normal rate and regular rhythm.   Pulmonary:      Effort: Pulmonary effort is normal.      Breath sounds: Normal breath sounds.   Abdominal:      General: Bowel sounds are normal.      Palpations: Abdomen is soft.      Tenderness: There is abdominal tenderness.   Musculoskeletal:      Right lower leg: Edema present.      Left lower leg: Edema present.      Comments: Baseline lower extremity swelling and pedal contractures   Skin:     General: Skin is warm and dry.   Neurological:      General: No focal deficit present.      Mental Status: She is alert and oriented to person, place, and time. Mental status is at baseline.          Medical Decision Making       61 MINUTES SPENT BY ME on the date of service doing chart review, history, exam, documentation & further activities per the note.      Data     I have personally reviewed the following data over the past 24 hrs:    6.9  \   11.9   / 255     138 101 15.8 /  102 (H)   3.5 22 0.67 \     ALT: 14 AST: 21 AP: 69 TBILI: 0.5   ALB: 4.2 TOT PROTEIN: 7.7 LIPASE: 48     Procal: N/A CRP: N/A Lactic Acid: 0.9         Imaging results reviewed over the past 24 hrs:   Recent Results (from the past 24 hours)   CT Abdomen Pelvis w Contrast    Narrative    EXAM: CT ABDOMEN PELVIS W CONTRAST  LOCATION: [facility]  DATE: 3/9/2025    INDICATION: check for pyelonephritis, Appendicitis  RUQ pain and N V  COMPARISON: CT abdomen pelvis 6/30/2021  TECHNIQUE: CT scan of the abdomen and pelvis was performed following injection of IV contrast. Multiplanar reformats were obtained. Dose reduction techniques were used.  CONTRAST: 101mL Isovue 370    FINDINGS:    LOWER CHEST: Small hiatal hernia.    HEPATOBILIARY: Unremarkable liver. Cholecystectomy. No biliary dilatation.    PANCREAS: Normal.    SPLEEN: Normal.    ADRENAL GLANDS: Normal.    KIDNEYS/BLADDER: No significant mass, stones, or hydronephrosis. There are simple or benign cysts. No follow up is needed. Normal enhancement of kidneys with no evidence of pyelonephritis. There is diffuse mucosal hyperenhancement of urinary bladder   which may represent cystitis.    BOWEL: Small hiatal hernia. Unremarkable small bowel. No evidence of appendicitis. Wall thickening of the transverse, descending and sigmoid colon suspicious for colitis.    LYMPH NODES: Normal.    VASCULATURE: Moderate vascular calcifications.    PELVIC ORGANS: Normal.    MUSCULOSKELETAL: ORIF right proximal femur.      Impression    IMPRESSION:   1.  Mucosal enhancement of the urinary bladder may represent cystitis. No evidence of pyelonephritis.  2.  Findings suspicious for colitis, likely infectious/inflammatory.

## 2025-03-09 NOTE — ED PROVIDER NOTES
Emergency Department Note      History of Present Illness     Chief Complaint   Fever    HPI   Marylee Woods is a 69 year old female with a history of multiple sclerosis, hypertension, stage 3 CKD, and breast cancer presenting to the ED for the evaluation of fever and other symptoms. The patient is accompanied to the ED by her  who contributes to the history. Marylee has been experiencing nausea, vomiting, diarrhea, fatigue, decreased appetite, and intermittent fever since 2/26/25. She also reports right upper quadrant pain that began a few days ago. While her diarrhea and vomiting has improved some since onset, her nausea has remained persistent. She suspects a potential norovirus infection as her  and daughter both were infected with it this past week. Marylee ultimately decided to come to the ED today due to concern for dehydration since she has been unable to keep food or liquids down. Her primary care doctor also recommended she be seen at the ED due to her MS diagnosis as well as two recent infusions of her new MS drug, Briumvi. Her most recent infusion took place this past Thursday (3/6/25) and the previous infusion occurred 2 weeks prior to that. She denies cough, chest pain, difficulty breathing, or skin infections. She mentions a history of sores on the left side of her bottom, though these are not currently present. The patient confirms history of cholecystectomy but denies appendectomy. She denies known allergies to IV contrast or history of kidney failure. Of note, she recently was seen at her clinic where she brought a urine sample and it was determined that she had a urine infection.    Independent Historian    as detailed above.    Review of External Notes   I reviewed a telephone note from 3/3/25 regarding patient's concern that she may have norovirus, stating that it has been going around her household.  I reviewed the patient's clinic notes.  I reviewed a 3/6/25 urine  culture and UA displaying 10,000-50,000 CFU/mL Enterococcus faecalis susceptible to Ampicillin, Nitrofurantoin, and Vancomycin.    Medical History and Problem List   Anemia  Ataxic gait  Atypical ductal hyperplasia, left breast  Candida esophagitis  Cataract  CKD stage 3  Depression  Ductal carcinoma in situ, left breast  Former tobacco use  Femur fracture  Fibula fracture  GERD  Graves disease  HSV  Hyperlipidemia  Hypertension  Hyponatremia  Hypoxemia  Hypothyroidism  Lung nodules  Malignant neoplasm, left breast  Multiple sclerosis  Neurogenic bladder  Obesity  Optic neuropathy  Osteoporosis  MARYLU  Pneumonia  Postablative hypothyroidism  Tibial fracture  UTI  Vitamin D deficiency    Medications   Acyclovir  Amlodipine  Amoxicillin  Armodafinil  Baclofen  Briumvi  Duloxetine  Famotidine  Gabapentin  Levothyroxine  Losartan  Oxybutynin  Nitrofurantoin  Valacyclovir  Yuvafem    Surgical History    section  Colonoscopy  Esophagoscopy, gastroscopy, duodenoscopy  Femur fracture reduction  Cholecystectomy  Breast lumpectomy  Tibia fracture reduction    Physical Exam     Patient Vitals for the past 24 hrs:   BP Temp Pulse Resp SpO2   25 0805 (!) 169/96 98  F (36.7  C) 84 18 100 %     Physical Exam  Nursing note and vitals reviewed.  Constitutional:  Appears well-developed and well-nourished.   HENT:   Head:    Atraumatic.   Mouth/Throat:   Oropharynx is clear and moist. No oropharyngeal exudate.   Eyes:    Pupils are equal, round, and reactive to light.   Neck:    Normal range of motion. Neck supple.      No tracheal deviation present. No thyromegaly present.   Cardiovascular:  Normal rate, regular rhythm, no murmur   Pulmonary/Chest: Breath sounds are clear and equal without wheezes or crackles.  Abdominal:   Soft. Bowel sounds are normal. Exhibits no distension and      no mass. There is right upper quadrant and right lower quadrant tenderness. There is no rebound and no guarding.   Back:                           Mild right CVA tenderness.  Musculoskeletal:  Exhibits no edema.   Lymphadenopathy:  No cervical adenopathy.   Neurological:   Alert and oriented to person, place, and time.   Skin:    Skin is warm and dry. No rash noted. No pallor.       Diagnostics     Lab Results   Labs Ordered and Resulted from Time of ED Arrival to Time of ED Departure   COMPREHENSIVE METABOLIC PANEL - Abnormal       Result Value    Sodium 138      Potassium 3.5      Carbon Dioxide (CO2) 22      Anion Gap 15      Urea Nitrogen 15.8      Creatinine 0.67      GFR Estimate >90      Calcium 9.6      Chloride 101      Glucose 102 (*)     Alkaline Phosphatase 69      AST 21      ALT 14      Protein Total 7.7      Albumin 4.2      Bilirubin Total 0.5     ROUTINE UA WITH MICROSCOPIC REFLEX TO CULTURE - Abnormal    Color Urine Light Yellow      Appearance Urine Clear      Glucose Urine Negative      Bilirubin Urine Negative      Ketones Urine 20 (*)     Specific Gravity Urine 1.019      Blood Urine Negative      pH Urine 5.5      Protein Albumin Urine 10 (*)     Urobilinogen Urine Normal      Nitrite Urine Negative      Leukocyte Esterase Urine Negative      Mucus Urine Present (*)     RBC Urine 1      WBC Urine 5      Squamous Epithelials Urine 7 (*)    CBC WITH PLATELETS AND DIFFERENTIAL - Abnormal    WBC Count 6.9      RBC Count 4.19      Hemoglobin 11.9      Hematocrit 35.7      MCV 85      MCH 28.4      MCHC 33.3      RDW 13.2      Platelet Count 255      % Neutrophils 87      % Lymphocytes 7      % Monocytes 4      % Eosinophils 1      % Basophils 0      % Immature Granulocytes 0      NRBCs per 100 WBC 0      Absolute Neutrophils 5.9      Absolute Lymphocytes 0.5 (*)     Absolute Monocytes 0.3      Absolute Eosinophils 0.1      Absolute Basophils 0.0      Absolute Immature Granulocytes 0.0      Absolute NRBCs 0.0     LIPASE - Normal    Lipase 48     LACTIC ACID WHOLE BLOOD WITH 1X REPEAT IN 2 HR WHEN >2 - Normal    Lactic Acid, Initial 0.9      BLOOD CULTURE   BLOOD CULTURE   URINE CULTURE   ENTERIC BACTERIA AND VIRUS PANEL BY PCR   C. DIFFICILE TOXIN B PCR WITH REFLEX TO C. DIFFICILE EIA       Imaging   CT Abdomen Pelvis w Contrast   Final Result   IMPRESSION:    1.  Mucosal enhancement of the urinary bladder may represent cystitis. No evidence of pyelonephritis.   2.  Findings suspicious for colitis, likely infectious/inflammatory.              Independent Interpretation   None    ED Course      Medications Administered   Medications   sodium chloride 0.9% BOLUS 1,000 mL (0 mLs Intravenous Stopped 3/9/25 1039)   ondansetron (ZOFRAN) injection 4 mg (4 mg Intravenous $Given 3/9/25 0924)   ampicillin-sulbactam (UNASYN) 3 g vial to attach to  mL bag (0 g Intravenous Stopped 3/9/25 1038)   iopamidol (ISOVUE-370) solution 101 mL (101 mLs Intravenous $Given 3/9/25 1201)   Saline (69 mLs As instructed $Given 3/9/25 1201)   acetaminophen (TYLENOL) tablet 650 mg (650 mg Oral $Given 3/9/25 1226)       Procedures   Procedures     Discussion of Management   Admitting Hospitalist, Tootie Cleveland CNP admitting with Dr. Owens  Pharmacy    ED Course   ED Course as of 03/09/25 1406   Sun Mar 09, 2025   0818 I obtained the history and examined the patient as noted above.      0933 I consulted with the pharmacist regarding antibiotics for the patient at this time.   1400 I spoke to Tootie Cleveland CNP admitting for Dr. Owens of the hospitalist service who accepts the patient for admission.       Additional Documentation  None    Medical Decision Making / Diagnosis     CMS Diagnoses: None    MIPS       None    [facility]   Marylee Woods is a 69 year old female who I found to have acute pyelonephritis based on her recent urinalysis which showed sign of infection and grew Enterococcus faecalis.  Her culture was sensitive to ampicillin so she was given Unasyn IV.  Her CT scan shows signs of cystitis but no definite pyelonephritis or abscess.  She also has signs of colitis  which would account for her recent vomiting and diarrhea illness which is likely due to norovirus and her diarrhea is currently resolved.  There was no sign of appendicitis at this time and no sign of bowel perforation.  She has had her gallbladder removed so there is no concern for cholecystitis.  Her lipase is normal and CT does not show any biliary obstruction.  LFTs are normal and there is no sign of ascending cholangitis.  She was admitted to the care of the hospitalist service for further evaluation treatment.    Disposition   The patient was admitted to the hospital.     Diagnosis     ICD-10-CM    1. Acute pyelonephritis  N10       2. Acute colitis  K52.9            Discharge Medications   New Prescriptions    No medications on file         Scribe Disclosure:  Shruthi COLON, am serving as a scribe at 8:08 AM on 3/9/2025 to document services personally performed by Rylie Guevara MD based on my observations and the provider's statements to me.        Rylie Guevara MD  03/09/25 [id number]       Rylie Guevara MD  03/09/25 [id number]

## 2025-03-09 NOTE — ED NOTES
[facility]  ED Nurse Handoff Report    ED Chief complaint: Fever      ED Diagnosis:   Final diagnoses:   Acute pyelonephritis   Acute colitis       Code Status: See order    Allergies:   Allergies   Allergen Reactions    Bactrim [Sulfamethoxazole-Trimethoprim] Hallucination    Hydrochlorothiazide      Hyponatremia    Sulfamethizole     Amantadine      hallucinations    Budesonide     Budesonide-Formoterol Fumarate Rash       Patient Story: Patient here with complaints of abdominal pain and generalized malaise. Hx MS.  Focused Assessment:  Afebrile. HTN.    Treatments and/or interventions provided: Tylenol, fluids, imaging, labs.  Patient's response to treatments and/or interventions: Tolerating.    To be done/followed up on inpatient unit:  Need stool sample. See orders.    Does this patient have any cognitive concerns?: Forgetful    Activity level - Baseline/Home:  Wheelchair  Activity Level - Current:   Total Care    Patient's Preferred language: English   Needed?: No    Isolation: None and Enteric  Infection: C-Diff Pending  Patient tested for COVID 19 prior to admission: NO  Bariatric?: No    Vital Signs:   Vitals:    03/09/25 1000 03/09/25 1100 03/09/25 1230 03/09/25 1300   BP: (!) 177/81 (!) 188/91 (!) 168/78 (!) 168/84   Pulse: 85 106 93 98   Resp: 16 13 15 16   Temp:       SpO2: 97% 99% 100% 98%       Cardiac Rhythm:     Was the PSS-3 completed:   Yes  What interventions are required if any?               Family Comments: Spouse at bedside.  OBS brochure/video discussed/provided to patient/family: N/A              Name of person given brochure if not patient: NA              Relationship to patient: NA    For the majority of the shift this patient's behavior was Green. Anxious.  Behavioral interventions performed were NA.    ED NURSE PHONE NUMBER: [phone]

## 2025-03-09 NOTE — ED TRIAGE NOTES
Dx with UTI yesterday and given amox.  Only had 1 dose and has been feeling nauseous.  Concerned for dehydration, UTI not getting better, and feeling gen malaise.  Reports had a fever this morning - temp unknown.      Triage Assessment (Adult)       Row Name 03/09/25 0801          Triage Assessment    Airway WDL WDL        Respiratory WDL    Respiratory WDL WDL        Cardiac WDL    Cardiac WDL WDL

## 2025-03-10 ENCOUNTER — APPOINTMENT (OUTPATIENT)
Dept: PHYSICAL THERAPY | Facility: CLINIC | Age: 70
DRG: 690 | End: 2025-03-10
Attending: NURSE PRACTITIONER
Payer: MEDICARE

## 2025-03-10 LAB
ALBUMIN SERPL BCG-MCNC: 3.4 G/DL (ref 3.5–5.2)
ALP SERPL-CCNC: 56 U/L (ref 40–150)
ALT SERPL W P-5'-P-CCNC: 10 U/L (ref 0–50)
ANION GAP SERPL CALCULATED.3IONS-SCNC: 16 MMOL/L (ref 7–15)
AST SERPL W P-5'-P-CCNC: 14 U/L (ref 0–45)
BILIRUB SERPL-MCNC: 0.4 MG/DL
BUN SERPL-MCNC: 9.7 MG/DL (ref 8–23)
CALCIUM SERPL-MCNC: 8.7 MG/DL (ref 8.8–10.4)
CHLORIDE SERPL-SCNC: 105 MMOL/L (ref 98–107)
CREAT SERPL-MCNC: 0.65 MG/DL (ref 0.51–0.95)
EGFRCR SERPLBLD CKD-EPI 2021: >90 ML/MIN/1.73M2
ERYTHROCYTE [DISTWIDTH] IN BLOOD BY AUTOMATED COUNT: 13 % (ref 10–15)
GLUCOSE SERPL-MCNC: 75 MG/DL (ref 70–99)
HCO3 SERPL-SCNC: 19 MMOL/L (ref 22–29)
HCT VFR BLD AUTO: 28.6 % (ref 35–47)
HGB BLD-MCNC: 10.1 G/DL (ref 11.7–15.7)
MAGNESIUM SERPL-MCNC: 1.4 MG/DL (ref 1.7–2.3)
MAGNESIUM SERPL-MCNC: 2.7 MG/DL (ref 1.7–2.3)
MCH RBC QN AUTO: 29.6 PG (ref 26.5–33)
MCHC RBC AUTO-ENTMCNC: 35.3 G/DL (ref 31.5–36.5)
MCV RBC AUTO: 84 FL (ref 78–100)
PLATELET # BLD AUTO: 220 10E3/UL (ref 150–450)
POTASSIUM SERPL-SCNC: 3.2 MMOL/L (ref 3.4–5.3)
POTASSIUM SERPL-SCNC: 3.3 MMOL/L (ref 3.4–5.3)
POTASSIUM SERPL-SCNC: 3.5 MMOL/L (ref 3.4–5.3)
PROT SERPL-MCNC: 6.3 G/DL (ref 6.4–8.3)
RBC # BLD AUTO: 3.41 10E6/UL (ref 3.8–5.2)
SODIUM SERPL-SCNC: 140 MMOL/L (ref 135–145)
WBC # BLD AUTO: 3.9 10E3/UL (ref 4–11)

## 2025-03-10 PROCEDURE — G0378 HOSPITAL OBSERVATION PER HR: HCPCS

## 2025-03-10 PROCEDURE — 120N000001 HC R&B MED SURG/OB

## 2025-03-10 PROCEDURE — 250N000013 HC RX MED GY IP 250 OP 250 PS 637: Performed by: HOSPITALIST

## 2025-03-10 PROCEDURE — 36415 COLL VENOUS BLD VENIPUNCTURE: CPT | Performed by: PHYSICIAN ASSISTANT

## 2025-03-10 PROCEDURE — 82040 ASSAY OF SERUM ALBUMIN: CPT | Performed by: NURSE PRACTITIONER

## 2025-03-10 PROCEDURE — 36415 COLL VENOUS BLD VENIPUNCTURE: CPT | Performed by: HOSPITALIST

## 2025-03-10 PROCEDURE — 999N000111 HC STATISTIC OT IP EVAL DEFER

## 2025-03-10 PROCEDURE — 250N000011 HC RX IP 250 OP 636: Performed by: PHYSICIAN ASSISTANT

## 2025-03-10 PROCEDURE — 84132 ASSAY OF SERUM POTASSIUM: CPT | Performed by: PHYSICIAN ASSISTANT

## 2025-03-10 PROCEDURE — 36415 COLL VENOUS BLD VENIPUNCTURE: CPT | Performed by: NURSE PRACTITIONER

## 2025-03-10 PROCEDURE — 97161 PT EVAL LOW COMPLEX 20 MIN: CPT | Mod: GP

## 2025-03-10 PROCEDURE — 83735 ASSAY OF MAGNESIUM: CPT | Performed by: PHYSICIAN ASSISTANT

## 2025-03-10 PROCEDURE — 258N000003 HC RX IP 258 OP 636: Performed by: NURSE PRACTITIONER

## 2025-03-10 PROCEDURE — 82947 ASSAY GLUCOSE BLOOD QUANT: CPT | Performed by: NURSE PRACTITIONER

## 2025-03-10 PROCEDURE — 250N000011 HC RX IP 250 OP 636: Performed by: NURSE PRACTITIONER

## 2025-03-10 PROCEDURE — 97530 THERAPEUTIC ACTIVITIES: CPT | Mod: GP

## 2025-03-10 PROCEDURE — 99232 SBSQ HOSP IP/OBS MODERATE 35: CPT | Performed by: PHYSICIAN ASSISTANT

## 2025-03-10 PROCEDURE — 84132 ASSAY OF SERUM POTASSIUM: CPT | Performed by: HOSPITALIST

## 2025-03-10 PROCEDURE — 85027 COMPLETE CBC AUTOMATED: CPT | Performed by: NURSE PRACTITIONER

## 2025-03-10 PROCEDURE — 250N000013 HC RX MED GY IP 250 OP 250 PS 637: Performed by: PHYSICIAN ASSISTANT

## 2025-03-10 RX ORDER — POTASSIUM CHLORIDE 1.5 G/1.58G
40 POWDER, FOR SOLUTION ORAL ONCE
Status: COMPLETED | OUTPATIENT
Start: 2025-03-10 | End: 2025-03-10

## 2025-03-10 RX ORDER — AMPICILLIN 1 G/1
1 INJECTION, POWDER, FOR SOLUTION INTRAMUSCULAR; INTRAVENOUS EVERY 6 HOURS
Status: DISCONTINUED | OUTPATIENT
Start: 2025-03-10 | End: 2025-03-14 | Stop reason: HOSPADM

## 2025-03-10 RX ORDER — MAGNESIUM SULFATE HEPTAHYDRATE 40 MG/ML
4 INJECTION, SOLUTION INTRAVENOUS ONCE
Status: COMPLETED | OUTPATIENT
Start: 2025-03-10 | End: 2025-03-10

## 2025-03-10 RX ORDER — ACETAMINOPHEN 325 MG/10.15ML
650 LIQUID ORAL EVERY 4 HOURS PRN
Status: DISCONTINUED | OUTPATIENT
Start: 2025-03-10 | End: 2025-03-14 | Stop reason: HOSPADM

## 2025-03-10 RX ADMIN — AMPICILLIN SODIUM AND SULBACTAM SODIUM 3 G: 2; 1 INJECTION, POWDER, FOR SOLUTION INTRAMUSCULAR; INTRAVENOUS at 08:54

## 2025-03-10 RX ADMIN — POTASSIUM CHLORIDE 40 MEQ: 1.5 POWDER, FOR SOLUTION ORAL at 12:46

## 2025-03-10 RX ADMIN — BACLOFEN 20 MG: 10 TABLET ORAL at 12:53

## 2025-03-10 RX ADMIN — SODIUM CHLORIDE, SODIUM LACTATE, POTASSIUM CHLORIDE, AND CALCIUM CHLORIDE: .6; .31; .03; .02 INJECTION, SOLUTION INTRAVENOUS at 21:29

## 2025-03-10 RX ADMIN — AMPICILLIN SODIUM 1 G: 1 INJECTION, POWDER, FOR SOLUTION INTRAMUSCULAR; INTRAVENOUS at 21:06

## 2025-03-10 RX ADMIN — ONDANSETRON 4 MG: 2 INJECTION, SOLUTION INTRAMUSCULAR; INTRAVENOUS at 01:01

## 2025-03-10 RX ADMIN — ONDANSETRON 4 MG: 2 INJECTION, SOLUTION INTRAMUSCULAR; INTRAVENOUS at 09:00

## 2025-03-10 RX ADMIN — AMPICILLIN SODIUM 1 G: 1 INJECTION, POWDER, FOR SOLUTION INTRAMUSCULAR; INTRAVENOUS at 16:32

## 2025-03-10 RX ADMIN — MAGNESIUM SULFATE HEPTAHYDRATE 4 G: 40 INJECTION, SOLUTION INTRAVENOUS at 12:46

## 2025-03-10 RX ADMIN — AMPICILLIN SODIUM AND SULBACTAM SODIUM 3 G: 2; 1 INJECTION, POWDER, FOR SOLUTION INTRAMUSCULAR; INTRAVENOUS at 01:01

## 2025-03-10 RX ADMIN — POTASSIUM CHLORIDE 40 MEQ: 1.5 POWDER, FOR SOLUTION ORAL at 18:03

## 2025-03-10 ASSESSMENT — ACTIVITIES OF DAILY LIVING (ADL)
ADLS_ACUITY_SCORE: 82
DEPENDENT_IADLS:: CLEANING;LAUNDRY;MEAL PREPARATION;TRANSPORTATION
ADLS_ACUITY_SCORE: 84
ADLS_ACUITY_SCORE: 73
ADLS_ACUITY_SCORE: 73
ADLS_ACUITY_SCORE: 84
ADLS_ACUITY_SCORE: 73
ADLS_ACUITY_SCORE: 82
ADLS_ACUITY_SCORE: 73
ADLS_ACUITY_SCORE: 73
ADLS_ACUITY_SCORE: 84
ADLS_ACUITY_SCORE: 73
ADLS_ACUITY_SCORE: 84
ADLS_ACUITY_SCORE: 73
ADLS_ACUITY_SCORE: 73
ADLS_ACUITY_SCORE: 82
ADLS_ACUITY_SCORE: 84
ADLS_ACUITY_SCORE: 82
ADLS_ACUITY_SCORE: 73
ADLS_ACUITY_SCORE: 82
ADLS_ACUITY_SCORE: 84
ADLS_ACUITY_SCORE: 77
ADLS_ACUITY_SCORE: 84
ADLS_ACUITY_SCORE: 82

## 2025-03-10 NOTE — PROGRESS NOTES
PRIMARY Concern: Weakness, UTI+, colitis   SAFETY RISK Concerns (fall risk, behaviors, etc.): Fall      Aggression Tool Color: Green   Isolation/Type: Enteric r/o   Tests/Procedures for NEXT shift: Stool sample needed, no BM throughout shift   Consults? (Pending/following, signed-off?) PT/CC following   Where is patient from? (Home, TCU, etc.): Home   Other Important info for NEXT shift: None   Anticipated DC date & active delays: TBD back to home   _____________________________________________________________________________  SUMMARY NOTE:   Orientation/Cognitive: AOx4  Observation Goals (Met/ Not Met): Not met   Mobility Level/Assist Equipment: WC bound at baseline. A2, PRN repo   Antibiotics & Plan (IV/po, length of tx left): IV Unasyn q6hr   Pain Management: Denies   Complete Pain Reassessment: Y/N Yes  Due next: Next shift   Tele/VS/O2: VSS on RA   ABNL Lab/BG: K+ 3.3; Mg 1.4; both replaced, rechecks ordered. WBC 3.9; Alb 3.4; Hgb 10.1; Protein 6.3  Diet: Clears   Bowel/Bladder: Incontinent of B&B   Skin Concerns: WNL   Drains/Devices: PIV infusing LR at 100   Patient Stated Goal for Today: Rest

## 2025-03-10 NOTE — PLAN OF CARE
PRIMARY Concern: Weakness  SAFETY RISK Concerns (fall risk, behaviors, etc.): Fall      Aggression Tool Color: Green  Isolation/Type: Entric Rule out  Tests/Procedures for NEXT shift: Stool Sampling   Consults? (Pending/following, signed-off?)   Where is patient from? (Home, TCU, etc.): Home  Other Important info for NEXT shift:   Anticipated DC date & active delays: TBD  _____________________________________________________________________________  SUMMARY NOTE:   Orientation/Cognitive: A&OX4  Observation Goals (Met/ Not Met): Not Met  Mobility Level/Assist Equipment: WC Bound   Antibiotics & Plan (IV/po, length of tx left): UNASYN Q6H,   Pain Management: Denies  Complete Pain Reassessment: Y Due next: Next Shift  Tele/VS/O2: VSS/RA  ABNL Lab/BG: See Chart  Diet: Regular Diet  Bowel/Bladder: Incontinent  Drains/Devices: PIV/SL  Patient Stated Goal for Today: Rest       Observation goals  PRIOR TO DISCHARGE       Comments: -diagnostic tests and consults completed and resulted -Not Met  -vital signs normal or at patient baseline -Not Met  -tolerating oral intake to maintain hydration -Not Met  -infection is improving -Not Met  Nurse to notify provider when observation goals have been met and patient is ready for discharge.

## 2025-03-10 NOTE — PLAN OF CARE
Physical Therapy Discharge Summary    Reason for therapy discharge:    All goals and outcomes met, no further needs identified.    Progress towards therapy goal(s). See goals on Care Plan in Epic electronic health record for goal details.  Goals met    Therapy recommendation(s):    No further therapy is recommended. PT Discharge Planning  PT Plan: DC  PT Discharge Recommendation (DC Rec): home with assist  PT Rationale for DC Rec: Anticipate Pt near baseline mobility. Lives w/ spouse and daughter in a house that is WC accesible. Slide board transfers at home w/ assistance to power WC. Currently completing slide board transfers w/ SBA. Anticipate when medically ready Pt can return to home w/ assist from daughter and spouse. All acute PT goals met will sign off.  PT Brief overview of current status: Goals of therapy will be to address safe mobility and make recs for d/c to next level of care. Pt and RN will continue to follow all falls risk precautions as documented by RN staff while hospitalized  PT Total Distance Amb During Session (feet): 0      Recommendation above provided by last treating therapist.

## 2025-03-10 NOTE — PLAN OF CARE
Occupational Therapy: Orders received. Chart reviewed and discussed with care team.? Occupational Therapy not indicated as the patient is at/near baseline for ADL per evaluating physical therapist. Pt will have assist as needed for all I/ADLs.? Defer discharge recommendations to PT/care team.? Will complete orders.

## 2025-03-10 NOTE — PLAN OF CARE
Goal Outcome Evaluation:      Plan of Care Reviewed With: patient    Overall Patient Progress: improvingOverall Patient Progress: improving    Outcome Evaluation: when medically ready, discahrge home with family

## 2025-03-10 NOTE — CONSULTS
Care Management Initial Consult    General Information  Assessment completed with: Patient, Marylee  Type of CM/SW Visit: Initial Assessment    Primary Care Provider verified and updated as needed: Yes   Readmission within the last 30 days: no previous admission in last 30 days      Reason for Consult: discharge planning  Advance Care Planning: Advance Care Planning Reviewed: no concerns identified          Communication Assessment  Patient's communication style: spoken language (English or Bilingual)             Cognitive  Cognitive/Neuro/Behavioral: WDL        Orientation: oriented x 4  Mood/Behavior: calm, cooperative          Living Environment:   People in home: spouse, child(ally), adult     Current living Arrangements: house      Able to return to prior arrangements: yes       Family/Social Support:  Care provided by: self, spouse/significant other, child(ally)  Provides care for:    Marital Status:   Support system:            Description of Support System:           Current Resources:   Patient receiving home care services: No        Community Resources: None  Equipment currently used at home: wheelchair, power, transfer board  Supplies currently used at home:      Employment/Financial:  Employment Status: retired        Financial Concerns: none           Does the patient's insurance plan have a 3 day qualifying hospital stay waiver?  Yes     Which insurance plan 3 day waiver is available? [employer]    Will the waiver be used for post-acute placement? No    Lifestyle & Psychosocial Needs:  Social Drivers of Health     Food Insecurity: Low Risk  (4/2/2024)    Food Insecurity     Within the past 12 months, did you worry that your food would run out before you got money to buy more?: No     Within the past 12 months, did the food you bought just not last and you didn t have money to get more?: No   Depression: Not at risk (2/19/2025)    PHQ-2     PHQ-2 Score: 2   Housing Stability: Low Risk  (4/2/2024)     Housing Stability     Do you have housing? : Yes     Are you worried about losing your housing?: No   Tobacco Use: Medium Risk (2/19/2025)    Patient History     Smoking Tobacco Use: Former     Smokeless Tobacco Use: Never     Passive Exposure: Current   Financial Resource Strain: Low Risk  (4/2/2024)    Financial Resource Strain     Within the past 12 months, have you or your family members you live with been unable to get utilities (heat, electricity) when it was really needed?: No   Alcohol Use: Not on file   Transportation Needs: Low Risk  (4/2/2024)    Transportation Needs     Within the past 12 months, has lack of transportation kept you from medical appointments, getting your medicines, non-medical meetings or appointments, work, or from getting things that you need?: No   Physical Activity: Inactive (4/2/2024)    Exercise Vital Sign     Days of Exercise per Week: 0 days     Minutes of Exercise per Session: 20 min   Interpersonal Safety: Low Risk  (9/24/2024)    Interpersonal Safety     Do you feel physically and emotionally safe where you currently live?: Yes     Within the past 12 months, have you been hit, slapped, kicked or otherwise physically hurt by someone?: No     Within the past 12 months, have you been humiliated or emotionally abused in other ways by your partner or ex-partner?: No   Stress: No Stress Concern Present (4/2/2024)    [employer] - Occupational Stress Questionnaire     Feeling of Stress : Only a little   Social Connections: Unknown (4/2/2024)    Social Connection and Isolation Panel [NHANES]     Frequency of Communication with Friends and Family: Not on file     Frequency of Social Gatherings with Friends and Family: Once a week     Attends Yarsani Services: Not on file     Active Member of Clubs or Organizations: Not on file     Attends Club or Organization Meetings: Not on file     Marital Status: Not on file   Health Literacy: Not on file  "      Functional Status:  Prior to admission patient needed assistance:   Dependent ADLs:: Bathing, Dressing, Positioning, Transfers, Wheelchair-independent, Toileting  Dependent IADLs:: Cleaning, Laundry, Meal Preparation, Transportation       Mental Health Status:  Mental Health Status: No Current Concerns       Chemical Dependency Status:                Values/Beliefs:  Spiritual, Cultural Beliefs, [employer] Practices, Values that affect care: no               Discussed  Partnership in Safe Discharge Planning  document with patient/family: Yes: verbally with patient    Additional Information:  Per H&P patient was admitted for Acute cystitis and Colitits related to getting the norovirus. \"I was sick for 10 days before coming in.\"  -Writer met with patient at bedside. Patient lives in a house with her spouse and daughter who are both very helpful to patient. She has a power wheelchair and slides from the wheelchair to her seat, car vs. At home.   Writer went over Medicare Observation Notice/MOON. She wonders if she will be inpatient or not. She was seen by PT and recommended to go back home with family assist.   Patient hopes to be able to tolerate her Vegetable Broth she ordered.  No CC/SW needs at this time. If patient needs help making an appointment, no consult is needed.    Next Steps: sign off    Liv Cason RN      "

## 2025-03-10 NOTE — PROGRESS NOTES
Observation goals  PRIOR TO DISCHARGE        -diagnostic tests and consults completed and resulted -Not Met  -vital signs normal or at patient baseline -Not Met  -tolerating oral intake to maintain hydration -Partially met   -infection is improving -Partially met    Nurse to notify provider when observation goals have been met and patient is ready for discharge.

## 2025-03-10 NOTE — PROGRESS NOTES
Observation goals  PRIOR TO DISCHARGE       Comments: -diagnostic tests and consults completed and resulted -Not Met  -vital signs normal or at patient baseline -Not Met  -tolerating oral intake to maintain hydration -Not Met  -infection is improving -Not Met  Nurse to notify provider when observation goals have been met and patient is ready for discharge.

## 2025-03-10 NOTE — PROGRESS NOTES
03/10/25 1047   Appointment Info   Signing Clinician's Name / Credentials (PT) Basim Wilson DPT   Living Environment   People in Home spouse;child(ally), adult   Current Living Arrangements house   Home Accessibility wheelchair accessible   Transportation Anticipated family or friend will provide   Living Environment Comments Reports living in a house w/ spouse and daughter. Ramp to enter. WC accesible.   Self-Care   Usual Activity Tolerance moderate   Current Activity Tolerance moderate   Equipment Currently Used at Home wheelchair, power;transfer board   Fall history within last six months no   Activity/Exercise/Self-Care Comment Reports spouse assists most ADLs. Can dress upper half of body. Uses a slide board for transfers to power WC. Spouse assists.   General Information   Onset of Illness/Injury or Date of Surgery 03/09/25   Referring Physician Tootie Cleveland APRN CNP   Patient/Family Therapy Goals Statement (PT) Return to home   Pertinent History of Current Problem (include personal factors and/or comorbidities that impact the POC) Marylee Woods is a 69 year old female with significant past medical history for multiple sclerosis wheelchair bound, malignant neoplasm of left breast status postlumpectomy in 2022, obstructive sleep apnea, hypertension, anemia, and depression who presented to the emergency department accompanied by her  due to weakness, fever, recent UTI diagnosed and recent norovirus. The hospitalist service requested to admit for further treatment evaluation.   Existing Precautions/Restrictions fall   Cognition   Affect/Mental Status (Cognition) WFL   Orientation Status (Cognition) oriented x 4   Follows Commands (Cognition) WFL   Pain Assessment   Patient Currently in Pain No   Integumentary/Edema   Integumentary/Edema no deficits were identifed   Posture    Posture Comments LEs in valgus positioning w/ bilateral feet DF and in everted position   Range of Motion (ROM)   ROM Comment  WFLs for mobility and transfers no formal testing completed   Strength (Manual Muscle Testing)   Strength Comments WFLs for mobility and transfers no formal testing completed   Bed Mobility   Comment, (Bed Mobility) Supine to sitting EOB w/ Min A x1   Transfers   Comment, (Transfers) Slide board transfer w/ SBA   Gait/Stairs (Locomotion)   Comment, (Gait/Stairs) Defered does not ambulate at baseline   Balance   Balance Comments No overt LOB noted   Clinical Impression   Criteria for Skilled Therapeutic Intervention Yes, treatment indicated   PT Diagnosis (PT) Impaired ambulation   Influenced by the following impairments Impaired strength, balance and activity tolerance   Functional limitations due to impairments Impaired ADLs, IADLs and functional mobility   Clinical Presentation (PT Evaluation Complexity) stable   Clinical Presentation Rationale Clinical judgment   Clinical Decision Making (Complexity) low complexity   Planned Therapy Interventions (PT) balance training;bed mobility training;home exercise program;patient/family education;transfer training;progressive activity/exercise   Risk & Benefits of therapy have been explained evaluation/treatment results reviewed;care plan/treatment goals reviewed;risks/benefits reviewed;current/potential barriers reviewed;participants voiced agreement with care plan;participants included;patient   PT Total Evaluation Time   PT Eval, Low Complexity Minutes (02237) 10   Physical Therapy Goals   PT Frequency One time eval and treatment only   PT Predicted Duration/Target Date for Goal Attainment 03/20/25   PT Goals Bed Mobility;Transfers   PT: Bed Mobility Minimal assist;Supine to/from sit;Goal Met   PT: Transfers Supervision/stand-by assist;Bed to/from chair;Assistive device;Goal Met   Interventions   Interventions Quick Adds Therapeutic Activity   Therapeutic Activity   Therapeutic Activities: dynamic activities to improve functional performance Minutes (38576) 15   Symptoms  Noted During/After Treatment None   Treatment Detail/Skilled Intervention Pt supine in bed at start of session. Agreeable to PT. Extra time for room set up. Supine to sit w/ assistance at BLEs to get to EOB. Reports this is how she completes at home. Able to weight shift to L w/ good tolerance for slide board transfer. Slideboard transfer from EOB <> power WC w/ SBA. Good sequencing noted. Far forward at EOB needing A x1 to scoot backwards. Min A x1 for sit to supine. A x1 to boost in bed. Slidding L and R x2 each direction w/ Min A x1 to place slide sheet and chucks pad. All needs met w/ call light in place and bed alarm on.   PT Discharge Planning   PT Plan DC   PT Discharge Recommendation (DC Rec) home with assist   PT Rationale for DC Rec Anticipate Pt near baseline mobility. Lives w/ spouse and daughter in a house that is WC accesible. Slide board transfers at home w/ assistance to power WC. Currently completing slide board transfers w/ SBA. Anticipate when medically ready Pt can return to home w/ assist from daughter and spouse. All acute PT goals met will sign off.   PT Brief overview of current status Goals of therapy will be to address safe mobility and make recs for d/c to next level of care. Pt and RN will continue to follow all falls risk precautions as documented by RN staff while hospitalized   PT Total Distance Amb During Session (feet) 0   Physical Therapy Time and Intention   Timed Code Treatment Minutes 15   Total Session Time (sum of timed and untimed services) 25

## 2025-03-10 NOTE — PROGRESS NOTES
[facility]  Hospitalist Progress Note    Assessment & Plan   Marylee Woods is a 69 year old female with significant past medical history for multiple sclerosis wheelchair bound, malignant neoplasm of left breast status postlumpectomy in 2022, obstructive sleep apnea, hypertension, anemia, and depression admitted on 3/10/25 with cystitis and colitis.     Acute cystitis  *Diagnosed with cystitis via PCP on 3/6 and prescribed Amoxicillin on 3/7. Urine culture grew Enterococcus faecalis. She states she took only 3 doses prior to ED visit 3/9.  *Afebrile, no leukocytosis, normal lactic acid. CT A/P shows evidence of cystitis.  - Switch from IV Unasyn to IV Ampicillin  - Urine culture pending    Acute colitis likely due to Norovirus  *Patient reports family members with Norovirus. She endorsed N/V/D. No melena or hematochezia. Diarrhea has since been improving per patient but she remains very nauseous.  *CT A/P showed findings of colitis in the transverse, descending and sigmoid colon.  - If any ongoing diarrhea, will send enteric panel  - Antiemetics PRN  - Continue IVF until oral intake improves    Generalized weakness  - PT evaluation  - SW consult for discharge planning    Hypokalemia  *K 3.3 due to GI losses.  - RN replacement protocol  - Check magnesium level     Multiple sclerosis  Wheelchair-bound  *Follows with Dr. Redman and receives Briumvi infusions as outpatient. At baseline, utilizes wheelchair and slide board for transfers.  - Continue PTA baclofen and gabapentin      Hypertension  - Continue PTA amlodopine and losartan  - IV hydralazine for SBP >180     Depression  - Continue PTA cymbalta     History of malignant neoplasm left breast status postlumpectomy  *Follows with Dr. Martinez oncology     Obstructive sleep apnea  - Continue PTA armodafinil     Hypothyroidism  - Continue PTA levothyroxine      Glaucoma   - Continue PTA latanoprost     Clinically Significant Risk Factors  Present on Admission        # Hypokalemia: Lowest K = 3.3 mmol/L in last 2 days, will replace as needed        # Hypoalbuminemia: Lowest albumin = 3.4 g/dL at 3/10/2025  8:07 AM, will monitor as appropriate     # Hypertension: Noted on problem list      # Anemia: based on hgb <11           # Financial/Environmental Concerns:             Diet: Advance Diet as Tolerated: Clear Liquid Diet     DVT Prophylaxis: Pneumatic Compression Devices   Verduzco Catheter: Not present  Lines: None     Cardiac Monitoring: None  Code Status: Full Code      Disposition Plan       Expected Discharge Date: 03/11/2025              Entered: Aisha Salguero PA-C 03/10/2025, 9:58 AM        Medically Ready for Discharge: Anticipated Tomorrow      The patient's care was discussed with the Attending Physician, Dr. Hagan, Bedside Nurse, and Patient.    The patient has been discussed with Dr. Hagan, who agrees with the assessment and plan at this time.    Aisha Salugero PA-C  [facility]  Securely message with the Vocera Web Console (learn more here)      Interval History   Patient reports ongoing nausea and poor appetite. Taking pills makes her very nauseous. Only doing sips of liquids. She reports some abdominal pain. No diarrhea overnight. Still overall feeling poorly.    -Data reviewed today: I reviewed all new labs and imaging results over the last 24 hours. I personally reviewed no images or EKG's today.    Physical Exam   Temp: 98.4  F (36.9  C) Temp src: Oral BP: (!) 149/84 Pulse: 95   Resp: 16 SpO2: 95 % O2 Device: None (Room air)    There were no vitals filed for this visit.  Vital Signs with Ranges  Temp:  [98.2  F (36.8  C)-98.9  F (37.2  C)] 98.4  F (36.9  C)  Pulse:  [] 95  Resp:  [13-18] 16  BP: (127-188)/(71-91) 149/84  SpO2:  [95 %-100 %] 95 %  I/O last 3 completed shifts:  In: 200 [IV Piggyback:200]  Out: 250 [Urine:250]      Constitutional: Awake, alert, cooperative, no apparent distress.     ENT: Normocephalic, without obvious abnormality, atraumatic. Normal sclera.    Neck: Supple, symmetrical, trachea midline  Pulmonary: No increased work of breathing, good air exchange, clear to auscultation bilaterally  Cardiovascular: Regular rate and rhythm  GI: Normal bowel sounds, soft, non-distended, right sided tenderness on exam without guarding  Skin: Visualized skin appeared clear.  Neuro: Oriented x 3. Moves all extremities spontaneously. No focal neuro deficits.  Psych:  Normal affect and mood  Extremities: Normal muscle tone. No gross deformities noted. Calves non-tender b/l. No pitting edema b/l LE    Medical Decision Making       35 MINUTES SPENT BY ME on the date of service doing chart review, history, exam, documentation & further activities per the note.         Medications   Current Facility-Administered Medications   Medication Dose Route Frequency Provider Last Rate Last Admin    lactated ringers infusion   Intravenous Continuous Tootie Cleveland APRN  mL/hr at 03/09/25 2102 New Bag at 03/09/25 2102    lactated ringers infusion   Intravenous Continuous Tootie Cleveland APRN CNP         Current Facility-Administered Medications   Medication Dose Route Frequency Provider Last Rate Last Admin    amLODIPine (NORVASC) tablet 5 mg  5 mg Oral Daily Tootie Cleveland APRN CNP   5 mg at 03/09/25 2100    ampicillin-sulbactam (UNASYN) 3 g vial to attach to  mL bag  3 g Intravenous Q6H Tootie Cleveland APRN CNP   3 g at 03/10/25 0854    armodafinil (NUVIGIL) tablet 200 mg  200 mg Oral QAM Andrés Owens, DO        baclofen (LIORESAL) tablet 20 mg  20 mg Oral QAM Andrés Owens Edsam, DO        baclofen (LIORESAL) tablet 30 mg  30 mg Oral QPM Andrés Owens, DO   30 mg at 03/09/25 2100    DULoxetine (CYMBALTA) DR capsule 20 mg  20 mg Oral Daily Tootie Cleveland APRN CNP   20 mg at 03/09/25 2101    DULoxetine (CYMBALTA) DR capsule 60 mg  60 mg Oral Daily Megha  SWAPNIL Mendoza CNP   60 mg at 03/09/25 2100    famotidine (PEPCID) tablet 40 mg  40 mg Oral At Bedtime Awdiane, SWAPNIL Mendoza CNP   40 mg at 03/09/25 2101    gabapentin (NEURONTIN) capsule 900 mg  900 mg Oral BID Awdiane, SWAPNIL Mendoza CNP   900 mg at 03/09/25 2059    latanoprost (XALATAN) 0.005 % ophthalmic solution 1 drop  1 drop Both Eyes At Bedtime Awdiane, SWAPNIL Mendoza CNP   1 drop at 03/09/25 2101    levothyroxine (SYNTHROID/LEVOTHROID) tablet 112 mcg  112 mcg Oral Daily Awdiane, SWAPNIL Mendoza CNP   112 mcg at 03/09/25 2059    losartan (COZAAR) tablet 50 mg  50 mg Oral BID Awdiane, SWAPNIL Mendoza CNP   50 mg at 03/09/25 2100       Data   Recent Labs   Lab 03/10/25  0807 03/09/25  0916   WBC 3.9* 6.9   HGB 10.1* 11.9   MCV 84 85    255    138   POTASSIUM 3.3* 3.5   CHLORIDE 105 101   CO2 19* 22   BUN 9.7 15.8   CR 0.65 0.67   ANIONGAP 16* 15   JORDON 8.7* 9.6   GLC 75 102*   ALBUMIN 3.4* 4.2   PROTTOTAL 6.3* 7.7   BILITOTAL 0.4 0.5   ALKPHOS 56 69   ALT 10 14   AST 14 21   LIPASE  --  48       Recent Results (from the past 24 hours)   CT Abdomen Pelvis w Contrast    Narrative    EXAM: CT ABDOMEN PELVIS W CONTRAST  LOCATION: [facility]  DATE: 3/9/2025    INDICATION: check for pyelonephritis, Appendicitis  RUQ pain and N V  COMPARISON: CT abdomen pelvis 6/30/2021  TECHNIQUE: CT scan of the abdomen and pelvis was performed following injection of IV contrast. Multiplanar reformats were obtained. Dose reduction techniques were used.  CONTRAST: 101mL Isovue 370    FINDINGS:   LOWER CHEST: Small hiatal hernia.    HEPATOBILIARY: Unremarkable liver. Cholecystectomy. No biliary dilatation.    PANCREAS: Normal.    SPLEEN: Normal.    ADRENAL GLANDS: Normal.    KIDNEYS/BLADDER: No significant mass, stones, or hydronephrosis. There are simple or benign cysts. No follow up is needed. Normal enhancement of kidneys with no evidence of pyelonephritis. There is diffuse mucosal  hyperenhancement of urinary bladder   which may represent cystitis.    BOWEL: Small hiatal hernia. Unremarkable small bowel. No evidence of appendicitis. Wall thickening of the transverse, descending and sigmoid colon suspicious for colitis.    LYMPH NODES: Normal.    VASCULATURE: Moderate vascular calcifications.    PELVIC ORGANS: Normal.    MUSCULOSKELETAL: ORIF right proximal femur.      Impression    IMPRESSION:   1.  Mucosal enhancement of the urinary bladder may represent cystitis. No evidence of pyelonephritis.  2.  Findings suspicious for colitis, likely infectious/inflammatory.

## 2025-03-11 LAB
ADV 40+41 DNA STL QL NAA+NON-PROBE: NEGATIVE
ANION GAP SERPL CALCULATED.3IONS-SCNC: 10 MMOL/L (ref 7–15)
ASTRO TYP 1-8 RNA STL QL NAA+NON-PROBE: NEGATIVE
BACTERIA UR CULT: NO GROWTH
BASOPHILS # BLD AUTO: 0 10E3/UL (ref 0–0.2)
BASOPHILS NFR BLD AUTO: 0 %
BUN SERPL-MCNC: 4.8 MG/DL (ref 8–23)
C CAYETANENSIS DNA STL QL NAA+NON-PROBE: NEGATIVE
CALCIUM SERPL-MCNC: 8.7 MG/DL (ref 8.8–10.4)
CAMPYLOBACTER DNA SPEC NAA+PROBE: NEGATIVE
CHLORIDE SERPL-SCNC: 105 MMOL/L (ref 98–107)
CREAT SERPL-MCNC: 0.6 MG/DL (ref 0.51–0.95)
CRYPTOSP DNA STL QL NAA+NON-PROBE: NEGATIVE
E COLI O157 DNA STL QL NAA+NON-PROBE: ABNORMAL
E HISTOLYT DNA STL QL NAA+NON-PROBE: NEGATIVE
EAEC ASTA GENE ISLT QL NAA+PROBE: NEGATIVE
EC STX1+STX2 GENES STL QL NAA+NON-PROBE: NEGATIVE
EGFRCR SERPLBLD CKD-EPI 2021: >90 ML/MIN/1.73M2
EOSINOPHIL # BLD AUTO: 0.1 10E3/UL (ref 0–0.7)
EOSINOPHIL NFR BLD AUTO: 2 %
EPEC EAE GENE STL QL NAA+NON-PROBE: NEGATIVE
ERYTHROCYTE [DISTWIDTH] IN BLOOD BY AUTOMATED COUNT: 13 % (ref 10–15)
ETEC LTA+ST1A+ST1B TOX ST NAA+NON-PROBE: NEGATIVE
G LAMBLIA DNA STL QL NAA+NON-PROBE: NEGATIVE
GLUCOSE SERPL-MCNC: 100 MG/DL (ref 70–99)
HCO3 SERPL-SCNC: 24 MMOL/L (ref 22–29)
HCT VFR BLD AUTO: 30.1 % (ref 35–47)
HGB BLD-MCNC: 10.2 G/DL (ref 11.7–15.7)
IMM GRANULOCYTES # BLD: 0 10E3/UL
IMM GRANULOCYTES NFR BLD: 0 %
LYMPHOCYTES # BLD AUTO: 0.5 10E3/UL (ref 0.8–5.3)
LYMPHOCYTES NFR BLD AUTO: 10 %
MAGNESIUM SERPL-MCNC: 1.7 MG/DL (ref 1.7–2.3)
MCH RBC QN AUTO: 28.3 PG (ref 26.5–33)
MCHC RBC AUTO-ENTMCNC: 33.9 G/DL (ref 31.5–36.5)
MCV RBC AUTO: 84 FL (ref 78–100)
MONOCYTES # BLD AUTO: 0.2 10E3/UL (ref 0–1.3)
MONOCYTES NFR BLD AUTO: 4 %
NEUTROPHILS # BLD AUTO: 4.1 10E3/UL (ref 1.6–8.3)
NEUTROPHILS NFR BLD AUTO: 83 %
NOROVIRUS GI+II RNA STL QL NAA+NON-PROBE: POSITIVE
NRBC # BLD AUTO: 0 10E3/UL
NRBC BLD AUTO-RTO: 0 /100
P SHIGELLOIDES DNA STL QL NAA+NON-PROBE: NEGATIVE
PLATELET # BLD AUTO: 205 10E3/UL (ref 150–450)
POTASSIUM SERPL-SCNC: 3.4 MMOL/L (ref 3.4–5.3)
POTASSIUM SERPL-SCNC: 3.6 MMOL/L (ref 3.4–5.3)
RBC # BLD AUTO: 3.6 10E6/UL (ref 3.8–5.2)
RVA RNA STL QL NAA+NON-PROBE: NEGATIVE
SALMONELLA SP RPOD STL QL NAA+PROBE: NEGATIVE
SAPO I+II+IV+V RNA STL QL NAA+NON-PROBE: NEGATIVE
SHIGELLA SP+EIEC IPAH ST NAA+NON-PROBE: NEGATIVE
SODIUM SERPL-SCNC: 139 MMOL/L (ref 135–145)
V CHOLERAE DNA SPEC QL NAA+PROBE: NEGATIVE
VIBRIO DNA SPEC NAA+PROBE: NEGATIVE
WBC # BLD AUTO: 4.9 10E3/UL (ref 4–11)
Y ENTEROCOL DNA STL QL NAA+PROBE: NEGATIVE

## 2025-03-11 PROCEDURE — 99232 SBSQ HOSP IP/OBS MODERATE 35: CPT | Performed by: PHYSICIAN ASSISTANT

## 2025-03-11 PROCEDURE — 85014 HEMATOCRIT: CPT | Performed by: PHYSICIAN ASSISTANT

## 2025-03-11 PROCEDURE — 80048 BASIC METABOLIC PNL TOTAL CA: CPT | Performed by: PHYSICIAN ASSISTANT

## 2025-03-11 PROCEDURE — 258N000003 HC RX IP 258 OP 636: Performed by: NURSE PRACTITIONER

## 2025-03-11 PROCEDURE — 82310 ASSAY OF CALCIUM: CPT | Performed by: PHYSICIAN ASSISTANT

## 2025-03-11 PROCEDURE — 250N000013 HC RX MED GY IP 250 OP 250 PS 637: Performed by: STUDENT IN AN ORGANIZED HEALTH CARE EDUCATION/TRAINING PROGRAM

## 2025-03-11 PROCEDURE — 36415 COLL VENOUS BLD VENIPUNCTURE: CPT | Performed by: PHYSICIAN ASSISTANT

## 2025-03-11 PROCEDURE — 87507 IADNA-DNA/RNA PROBE TQ 12-25: CPT | Performed by: EMERGENCY MEDICINE

## 2025-03-11 PROCEDURE — 84132 ASSAY OF SERUM POTASSIUM: CPT | Performed by: PHYSICIAN ASSISTANT

## 2025-03-11 PROCEDURE — 120N000001 HC R&B MED SURG/OB

## 2025-03-11 PROCEDURE — 85004 AUTOMATED DIFF WBC COUNT: CPT | Performed by: PHYSICIAN ASSISTANT

## 2025-03-11 PROCEDURE — 250N000013 HC RX MED GY IP 250 OP 250 PS 637: Performed by: PHYSICIAN ASSISTANT

## 2025-03-11 PROCEDURE — 250N000011 HC RX IP 250 OP 636: Performed by: PHYSICIAN ASSISTANT

## 2025-03-11 PROCEDURE — 250N000011 HC RX IP 250 OP 636: Performed by: NURSE PRACTITIONER

## 2025-03-11 PROCEDURE — 82565 ASSAY OF CREATININE: CPT | Performed by: PHYSICIAN ASSISTANT

## 2025-03-11 PROCEDURE — 83735 ASSAY OF MAGNESIUM: CPT | Performed by: PHYSICIAN ASSISTANT

## 2025-03-11 PROCEDURE — 85041 AUTOMATED RBC COUNT: CPT | Performed by: PHYSICIAN ASSISTANT

## 2025-03-11 RX ORDER — POTASSIUM CHLORIDE 20MEQ/15ML
40 LIQUID (ML) ORAL ONCE
Status: DISCONTINUED | OUTPATIENT
Start: 2025-03-11 | End: 2025-03-11 | Stop reason: ALTCHOICE

## 2025-03-11 RX ORDER — POTASSIUM CHLORIDE 7.45 MG/ML
10 INJECTION INTRAVENOUS
Status: COMPLETED | OUTPATIENT
Start: 2025-03-11 | End: 2025-03-11

## 2025-03-11 RX ORDER — ENOXAPARIN SODIUM 100 MG/ML
40 INJECTION SUBCUTANEOUS EVERY 24 HOURS
Status: DISCONTINUED | OUTPATIENT
Start: 2025-03-11 | End: 2025-03-14 | Stop reason: HOSPADM

## 2025-03-11 RX ADMIN — ACETAMINOPHEN 650 MG: 325 SUSPENSION ORAL at 08:35

## 2025-03-11 RX ADMIN — POTASSIUM CHLORIDE 10 MEQ: 7.46 INJECTION, SOLUTION INTRAVENOUS at 11:18

## 2025-03-11 RX ADMIN — Medication 5 MG: at 21:14

## 2025-03-11 RX ADMIN — ONDANSETRON 4 MG: 2 INJECTION, SOLUTION INTRAMUSCULAR; INTRAVENOUS at 08:22

## 2025-03-11 RX ADMIN — ACETAMINOPHEN 650 MG: 325 SUSPENSION ORAL at 18:26

## 2025-03-11 RX ADMIN — AMPICILLIN SODIUM 1 G: 1 INJECTION, POWDER, FOR SOLUTION INTRAMUSCULAR; INTRAVENOUS at 15:49

## 2025-03-11 RX ADMIN — POTASSIUM CHLORIDE 10 MEQ: 7.46 INJECTION, SOLUTION INTRAVENOUS at 13:20

## 2025-03-11 RX ADMIN — AMPICILLIN SODIUM 1 G: 1 INJECTION, POWDER, FOR SOLUTION INTRAMUSCULAR; INTRAVENOUS at 02:55

## 2025-03-11 RX ADMIN — AMPICILLIN SODIUM 1 G: 1 INJECTION, POWDER, FOR SOLUTION INTRAMUSCULAR; INTRAVENOUS at 21:18

## 2025-03-11 RX ADMIN — ENOXAPARIN SODIUM 40 MG: 40 INJECTION SUBCUTANEOUS at 09:42

## 2025-03-11 RX ADMIN — LATANOPROST 1 DROP: 50 SOLUTION OPHTHALMIC at 21:24

## 2025-03-11 RX ADMIN — AMPICILLIN SODIUM 1 G: 1 INJECTION, POWDER, FOR SOLUTION INTRAMUSCULAR; INTRAVENOUS at 09:41

## 2025-03-11 RX ADMIN — ONDANSETRON 4 MG: 2 INJECTION, SOLUTION INTRAMUSCULAR; INTRAVENOUS at 19:22

## 2025-03-11 RX ADMIN — SODIUM CHLORIDE, SODIUM LACTATE, POTASSIUM CHLORIDE, AND CALCIUM CHLORIDE: .6; .31; .03; .02 INJECTION, SOLUTION INTRAVENOUS at 10:56

## 2025-03-11 RX ADMIN — ACETAMINOPHEN 650 MG: 325 SUSPENSION ORAL at 13:26

## 2025-03-11 ASSESSMENT — ACTIVITIES OF DAILY LIVING (ADL)
ADLS_ACUITY_SCORE: 86
ADLS_ACUITY_SCORE: 86
ADLS_ACUITY_SCORE: 84
ADLS_ACUITY_SCORE: 82
ADLS_ACUITY_SCORE: 84
ADLS_ACUITY_SCORE: 82
ADLS_ACUITY_SCORE: 82
ADLS_ACUITY_SCORE: 84
ADLS_ACUITY_SCORE: 86
ADLS_ACUITY_SCORE: 82
ADLS_ACUITY_SCORE: 86
ADLS_ACUITY_SCORE: 82
ADLS_ACUITY_SCORE: 84
ADLS_ACUITY_SCORE: 82
ADLS_ACUITY_SCORE: 84

## 2025-03-11 NOTE — PLAN OF CARE
Goal Outcome Evaluation:           Observation goals  PRIOR TO DISCHARGE       Comments: -diagnostic tests and consults completed and resulted: Not Met   -vital signs normal or at patient baseline: partially Met   -tolerating oral intake to maintain hydration: Not Met   -infection is improving: Partially Met   Nurse to notify provider when observation goals have been met and patient is ready for discharge.

## 2025-03-11 NOTE — PLAN OF CARE
Orientation: alert and oriented x4- forgetful    Vitals/Tele: vitals stable on room air-intermittent fever (Tmax 100.6) and elevated blood pressure, discomfort and fever treated with tylenol, nausea treated with zofran x1    IV Access/drains: L AC PIV- painful with flushing after administration of potassium- new PIV placed infusing LR @ 75ml/hr w/ intermittent abx, purewick    Diet: fulls-can advance if tolerating    Mobility: MS- wheelchair bound at baseline, assist 2 w/ lift, turn and repositioning as Pt allows    GI/: incontinent of bowel and bladder- purewick, 1xBM - labs sent    Wound/Skin: blanchable redness to coccyx/sacrum, dependent edema +2, dry    Consults: OT, CC    Discharge Plan: when medically ready will discharge home with family    See Flow sheets for assessment

## 2025-03-11 NOTE — PROGRESS NOTES
PRIMARY Concern: Weakness, UTI+, N/V/D   SAFETY RISK Concerns (fall risk, behaviors, etc.): Fall risk  Aggression Tool Color: Green   Isolation/Type: Enteric r/o   Tests/Procedures for NEXT shift: Stool sample needed  Consults? (Pending/following, signed-off?) PT/CC following   Where is patient from? (Home, TCU, etc.): Home   Other Important info for NEXT shift: Patient wanting to discharge tomorrow AM if possible and complete outpt testing if needed. Refusing evening oral medications.  Anticipated DC date & active delays: 3/11?  _____________________________________________________________________________  SUMMARY NOTE:   Orientation/Cognitive: AxOx4  Observation Goals (Met/ Not Met): Inpt  Mobility Level/Assist Equipment: WC bound at baseline. A2 lift. PRN repo   Antibiotics & Plan (IV/po, length of tx left): IV Ampicillin Q6hr   Pain Management: Denies   Complete Pain Reassessment: Y/N Yes  Due next: Next shift   Tele/VS/O2: VSS on RA   ABNL Lab/BG: K+ 3.2 (replaced), Mg= 2.7  Diet: Clears   Bowel/Bladder: Incontinent of B&B, purewick @ night. No BM this shift  Skin Concerns: Scattered bruising   Drains/Devices: PIV infusing LR at 75ml/hr  Patient Stated Goal for Today: Rest

## 2025-03-11 NOTE — PLAN OF CARE
Goal Outcome Evaluation:           Overall Patient Progress: improvingOverall Patient Progress: improving    Outcome Evaluation: patient slept throughout the shift    PRIMARY Concern: Weakness, nausea ,vomiting and UTI   SAFETY RISK Concerns (fall risk, behaviors, etc.): Fall risk  Aggression Tool Color: Green   Isolation/Type: Enteric  Rule out   Tests/Procedures for NEXT shift: Stool sample needed  Consults? (Pending/following, signed-off?)PT    Where is patient from? (Home, TCU, etc.): Home   Other Important info for NEXT shift: On potassium protocol and magnesium  , no replacement needed this shift. Recheck in the morning. Frequent safety rounds provided. Fall and safety precautions in place.    Anticipated DC date & active delays: TBD .    _____________________________________________________________________________  SUMMARY NOTE: 03/10/25. [id number].   Orientation/Cognitive:  patient is alert and oriented X4, Patient make all needs known .   Observation Goals (Met/ Not Met): Inpatient   Mobility Level/Assist Equipment: Up with assist of 2 with lift . Turn and Repo per patient request. Wheel bound   Antibiotics & Plan (IV/po, length of tx left): On IV abx , no adverse reactions noted or reported.  Pain Management: Patient denied pain.    Complete Pain Reassessment: Y/N Yes  Due next: NA   Tele/VS/O2: VSS on RA  with sat/  ABNL Lab/BG: potassium 3.5  Mg= 2.7  Diet: Clears   Bowel/Bladder: Incontinent of B&B, Pure wick at bedtime .  Skin Concerns: Scattered bruising   Drains/Devices: PIV infusing LR at 75ml/hr  Patient Stated Goal for Today:

## 2025-03-11 NOTE — PROVIDER NOTIFICATION
MD Notification    Notified Person: MD    Notified Person Name: Aisha RecioJose M- PA    Notification Date/Time: 3/11 @ 9399    Notification Interaction: vocera    Purpose of Notification: Pt's potassium low, replacing with IV- Pt states it hurt too much during the second bag, refusing administration- now open to PO Kayciel solution but unsure how to dose. Do you want to just recheck potassium now and replace according to current level or do you have a dose in mind for additional PO administration? Please advise, thanks Carlie MOODY [phone]    Orders Received: recheck @ 1700, replace from there    Comments:

## 2025-03-12 LAB
MAGNESIUM SERPL-MCNC: 1.4 MG/DL (ref 1.7–2.3)
MAGNESIUM SERPL-MCNC: 2.4 MG/DL (ref 1.7–2.3)
POTASSIUM SERPL-SCNC: 3.3 MMOL/L (ref 3.4–5.3)
POTASSIUM SERPL-SCNC: 4.4 MMOL/L (ref 3.4–5.3)

## 2025-03-12 PROCEDURE — 250N000013 HC RX MED GY IP 250 OP 250 PS 637: Performed by: INTERNAL MEDICINE

## 2025-03-12 PROCEDURE — 250N000013 HC RX MED GY IP 250 OP 250 PS 637: Performed by: PHYSICIAN ASSISTANT

## 2025-03-12 PROCEDURE — 250N000011 HC RX IP 250 OP 636: Performed by: INTERNAL MEDICINE

## 2025-03-12 PROCEDURE — 99232 SBSQ HOSP IP/OBS MODERATE 35: CPT | Performed by: INTERNAL MEDICINE

## 2025-03-12 PROCEDURE — 250N000013 HC RX MED GY IP 250 OP 250 PS 637: Performed by: STUDENT IN AN ORGANIZED HEALTH CARE EDUCATION/TRAINING PROGRAM

## 2025-03-12 PROCEDURE — 120N000001 HC R&B MED SURG/OB

## 2025-03-12 PROCEDURE — 36415 COLL VENOUS BLD VENIPUNCTURE: CPT | Performed by: STUDENT IN AN ORGANIZED HEALTH CARE EDUCATION/TRAINING PROGRAM

## 2025-03-12 PROCEDURE — 83735 ASSAY OF MAGNESIUM: CPT | Performed by: INTERNAL MEDICINE

## 2025-03-12 PROCEDURE — 250N000013 HC RX MED GY IP 250 OP 250 PS 637: Performed by: NURSE PRACTITIONER

## 2025-03-12 PROCEDURE — 84132 ASSAY OF SERUM POTASSIUM: CPT | Performed by: PHYSICIAN ASSISTANT

## 2025-03-12 PROCEDURE — 250N000011 HC RX IP 250 OP 636: Performed by: NURSE PRACTITIONER

## 2025-03-12 PROCEDURE — 258N000003 HC RX IP 258 OP 636: Performed by: NURSE PRACTITIONER

## 2025-03-12 PROCEDURE — 83735 ASSAY OF MAGNESIUM: CPT | Performed by: STUDENT IN AN ORGANIZED HEALTH CARE EDUCATION/TRAINING PROGRAM

## 2025-03-12 PROCEDURE — 84132 ASSAY OF SERUM POTASSIUM: CPT | Performed by: INTERNAL MEDICINE

## 2025-03-12 PROCEDURE — 36415 COLL VENOUS BLD VENIPUNCTURE: CPT | Performed by: INTERNAL MEDICINE

## 2025-03-12 PROCEDURE — 250N000011 HC RX IP 250 OP 636: Performed by: PHYSICIAN ASSISTANT

## 2025-03-12 RX ORDER — MAGNESIUM SULFATE HEPTAHYDRATE 40 MG/ML
4 INJECTION, SOLUTION INTRAVENOUS ONCE
Status: COMPLETED | OUTPATIENT
Start: 2025-03-12 | End: 2025-03-12

## 2025-03-12 RX ORDER — POTASSIUM CHLORIDE 1.5 G/1.58G
40 POWDER, FOR SOLUTION ORAL ONCE
Status: COMPLETED | OUTPATIENT
Start: 2025-03-12 | End: 2025-03-12

## 2025-03-12 RX ADMIN — Medication 5 MG: at 20:09

## 2025-03-12 RX ADMIN — MAGNESIUM SULFATE HEPTAHYDRATE 4 G: 40 INJECTION, SOLUTION INTRAVENOUS at 10:05

## 2025-03-12 RX ADMIN — ONDANSETRON 4 MG: 2 INJECTION, SOLUTION INTRAMUSCULAR; INTRAVENOUS at 09:02

## 2025-03-12 RX ADMIN — AMLODIPINE BESYLATE 5 MG: 5 TABLET ORAL at 15:42

## 2025-03-12 RX ADMIN — AMPICILLIN SODIUM 1 G: 1 INJECTION, POWDER, FOR SOLUTION INTRAMUSCULAR; INTRAVENOUS at 15:14

## 2025-03-12 RX ADMIN — POTASSIUM CHLORIDE 40 MEQ: 1.5 POWDER, FOR SOLUTION ORAL at 10:05

## 2025-03-12 RX ADMIN — AMPICILLIN SODIUM 1 G: 1 INJECTION, POWDER, FOR SOLUTION INTRAMUSCULAR; INTRAVENOUS at 08:28

## 2025-03-12 RX ADMIN — ACETAMINOPHEN 650 MG: 325 SUSPENSION ORAL at 03:09

## 2025-03-12 RX ADMIN — LATANOPROST 1 DROP: 50 SOLUTION OPHTHALMIC at 20:10

## 2025-03-12 RX ADMIN — SODIUM CHLORIDE, SODIUM LACTATE, POTASSIUM CHLORIDE, AND CALCIUM CHLORIDE: .6; .31; .03; .02 INJECTION, SOLUTION INTRAVENOUS at 02:44

## 2025-03-12 RX ADMIN — ENOXAPARIN SODIUM 40 MG: 40 INJECTION SUBCUTANEOUS at 10:05

## 2025-03-12 RX ADMIN — ACETAMINOPHEN 650 MG: 325 SUSPENSION ORAL at 10:05

## 2025-03-12 RX ADMIN — SODIUM CHLORIDE, SODIUM LACTATE, POTASSIUM CHLORIDE, AND CALCIUM CHLORIDE: .6; .31; .03; .02 INJECTION, SOLUTION INTRAVENOUS at 14:11

## 2025-03-12 RX ADMIN — LOSARTAN POTASSIUM 50 MG: 50 TABLET, FILM COATED ORAL at 15:42

## 2025-03-12 RX ADMIN — LOSARTAN POTASSIUM 50 MG: 50 TABLET, FILM COATED ORAL at 20:09

## 2025-03-12 RX ADMIN — AMPICILLIN SODIUM 1 G: 1 INJECTION, POWDER, FOR SOLUTION INTRAMUSCULAR; INTRAVENOUS at 02:49

## 2025-03-12 RX ADMIN — AMPICILLIN SODIUM 1 G: 1 INJECTION, POWDER, FOR SOLUTION INTRAMUSCULAR; INTRAVENOUS at 21:28

## 2025-03-12 ASSESSMENT — ACTIVITIES OF DAILY LIVING (ADL)
ADLS_ACUITY_SCORE: 82
ADLS_ACUITY_SCORE: 78
ADLS_ACUITY_SCORE: 82
ADLS_ACUITY_SCORE: 78
ADLS_ACUITY_SCORE: 78
ADLS_ACUITY_SCORE: 82
ADLS_ACUITY_SCORE: 78
ADLS_ACUITY_SCORE: 82
ADLS_ACUITY_SCORE: 82
ADLS_ACUITY_SCORE: 78
ADLS_ACUITY_SCORE: 82
ADLS_ACUITY_SCORE: 78

## 2025-03-12 NOTE — PLAN OF CARE
Here for Norovirus, UTI. A&O x4.  Neuros intact x baseline weakness and numbness in lower extremities. VSS on RA. W/c bound at baseline, turn/repo in bed when pt agreeable. Skin assessment WNL. Given tylenol for pain and zofran for nausea. Voiding in purewick. Tolerating regular diet, appetite is fair. Takes pills whole. Alarms on. Plan for discharge in 1-2 days pending diet tolerance.

## 2025-03-12 NOTE — PROGRESS NOTES
"Pt refusing to take morning medications because of stomach discomfort and little food in her stomach. Currently tolerating full liquid diet. Complaining of nausea \"all the time\". Will give PRN zofran. IVF running. Pt agreeable to try oral medications later today when feeling better.   "

## 2025-03-12 NOTE — PROGRESS NOTES
[facility]    Medicine Progress Note - Hospitalist Service    Date of Admission:  3/9/2025    Assessment & Plan   Marylee Woods is a 69 year old female with significant past medical history for multiple sclerosis wheelchair bound, malignant neoplasm of left breast status postlumpectomy in 2022, obstructive sleep apnea, hypertension, anemia, and depression admitted on 3/10/25 with cystitis and colitis.     Acute cystitis  *Diagnosed with cystitis via PCP on 3/6 and prescribed Amoxicillin on 3/7. Urine culture grew Enterococcus faecalis. She states she took only 3 doses prior to ED visit 3/9. Reported ongoing dysuria.  *Afebrile, no leukocytosis, normal lactic acid. CT A/P shows evidence of cystitis.  - Continue IV Ampicillin  - Urine culture no growth (note she was on abx prior)     Acute colitis likely due to Norovirus  *Patient reports family members with Norovirus. She endorsed N/V/D. No melena or hematochezia. Diarrhea has since been improving per patient but she remains very nauseous.  *CT A/P showed findings of colitis in the transverse, descending and sigmoid colon.  *No further diarrhea but ongoing nausea with poor oral intake   - Antiemetics PRN  - Continue IVF until oral intake improves.  Encourage antiemetics and ADAT if tolerated    Generalized weakness  - PT recommend home with assist     Hypokalemia  Hypomagnesemia  - RN replacement protocol     Multiple sclerosis  Wheelchair-bound  *Follows with Dr. Redman and receives Briumvi infusions as outpatient. At baseline, utilizes wheelchair and slide board for transfers.  - Continue PTA baclofen and gabapentin      Hypertension  - Continue PTA amlodopine and losartan  - IV hydralazine for SBP >180     Depression  - Continue PTA cymbalta     History of malignant neoplasm left breast status postlumpectomy  *Follows with Dr. Martinez oncology     Obstructive sleep apnea  - Continue PTA armodafinil     Hypothyroidism  - Continue PTA  "levothyroxine      Glaucoma   - Continue PTA latanoprost           Diet: Regular Diet Adult    DVT Prophylaxis: Enoxaparin (Lovenox) SQ  Verduzco Catheter: Not present  Lines: None     Cardiac Monitoring: None  Code Status: Full Code      Clinically Significant Risk Factors        # Hypokalemia: Lowest K = 3.2 mmol/L in last 2 days, will replace as needed      # Hypomagnesemia: Lowest Mg = 1.4 mg/dL in last 2 days, will replace as needed   # Hypoalbuminemia: Lowest albumin = 3.4 g/dL at 3/10/2025  8:07 AM, will monitor as appropriate     # Hypertension: Noted on problem list                # Financial/Environmental Concerns: none         Social Drivers of Health    Tobacco Use: Medium Risk (2/19/2025)    Patient History     Smoking Tobacco Use: Former     Smokeless Tobacco Use: Never     Passive Exposure: Current   Physical Activity: Inactive (4/2/2024)    Exercise Vital Sign     Days of Exercise per Week: 0 days     Minutes of Exercise per Session: 20 min   Social Connections: Unknown (4/2/2024)    Social Connection and Isolation Panel [NHANES]     Frequency of Social Gatherings with Friends and Family: Once a week          Disposition Plan     Medically Ready for Discharge: Anticipated Tomorrow if no further nausea and vomiting and tolerating p.o.       Basil Bacon MD  Hospitalist Service  [facility]  Securely message with [employer]ep[employer]! (more info)  Text page via [employer] Paging/Directory   _\"This dictation was performed with voice recognition software and may contain errors,  omissions and inadvertent word substitution.\"    _____________________________________________________________________    Interval History   Feeling generally unwell today.  Had nausea but no vomiting  Denies any fever/chills    Physical Exam   BP (!) 154/76 (BP Location: Right arm)   Pulse 72   Temp 98.8  F (37.1  C) (Oral)   Resp 19   Ht 1.727 m (5' 8\")   Wt 62.2 kg (137 lb 2 oz)   LMP 01/31/2004 (Approximate)   SpO2 " 99%   BMI 20.85 kg/m    Gen- pleasant   Neck- supple  CVS- I+II+ no m/r/g  RS- CTAB  Abdo- soft, no tenderness. No g/r/r   Ext- no edema     Medical Decision Making       45 MINUTES SPENT BY ME on the date of service doing chart review, history, exam, documentation & further activities per the note.      Data   ------------------------- PAST 24 HR DATA REVIEWED -----------------------------------------------    I have personally reviewed the following data over the past 24 hrs:    N/A  \   N/A   / N/A     N/A N/A N/A /  N/A   3.3 (L) N/A N/A \       Imaging results reviewed over the past 24 hrs:   No results found for this or any previous visit (from the past 24 hours).

## 2025-03-12 NOTE — PROGRESS NOTES
PRIMARY Concern: Weakness, UTI+, N/V/D   SAFETY RISK Concerns (fall risk, behaviors, etc.): Fall risk  Aggression Tool Color: Green   Isolation/Type: Enteric r/o   Tests/Procedures for NEXT shift: Stool sample needed  Consults? (Pending/following, signed-off?) PT/CC following   Where is patient from? (Home, TCU, etc.): Home   Other Important info for NEXT shift: Refusing evening oral medications. Requesting to be left to sleep overnight with awakenings minimized.  Anticipated DC date & active delays: TBD  _____________________________________________________________________________  SUMMARY NOTE:  Pt requested for writer to allow her to sleep overnight, with minimal disruptions.   PRN Melatonin ordered and given at HS. Around 0300, pt complaining of head ache and feeling hot. Tylenol given for symptom relief.   Orientation/Cognitive: AxOx4  Observation Goals (Met/ Not Met): Inpt  Mobility Level/Assist Equipment: WC bound at baseline. A2 lift. PRN repo   Antibiotics & Plan (IV/po, length of tx left): IV Ampicillin Q6hr   Pain Management: Denies   Complete Pain Reassessment: Y/N Yes  Due next: Next shift   Tele/VS/O2: VSS on RA   ABNL Lab/BG: K and Mag protocol. Rechecks in AM 3/12  Diet: Clears   Bowel/Bladder: Incontinent of B&B, purewick @ night. No BM this shift  Skin Concerns: Scattered bruising. Sacrum Coccyx blanchable redness. (Mepi in place)   Drains/Devices: PIV infusing LR at 75ml/hr  Patient Stated Goal for Today: Rest

## 2025-03-13 VITALS
HEIGHT: 68 IN | TEMPERATURE: 98.7 F | WEIGHT: 137.13 LBS | DIASTOLIC BLOOD PRESSURE: 76 MMHG | HEART RATE: 79 BPM | SYSTOLIC BLOOD PRESSURE: 149 MMHG | BODY MASS INDEX: 20.78 KG/M2 | RESPIRATION RATE: 16 BRPM | OXYGEN SATURATION: 98 %

## 2025-03-13 LAB
ANION GAP SERPL CALCULATED.3IONS-SCNC: 10 MMOL/L (ref 7–15)
BACTERIA BLD CULT: NORMAL
BACTERIA BLD CULT: NORMAL
BUN SERPL-MCNC: 5.1 MG/DL (ref 8–23)
CALCIUM SERPL-MCNC: 9 MG/DL (ref 8.8–10.4)
CHLORIDE SERPL-SCNC: 103 MMOL/L (ref 98–107)
CREAT SERPL-MCNC: 0.68 MG/DL (ref 0.51–0.95)
EGFRCR SERPLBLD CKD-EPI 2021: >90 ML/MIN/1.73M2
GLUCOSE SERPL-MCNC: 115 MG/DL (ref 70–99)
HCO3 SERPL-SCNC: 27 MMOL/L (ref 22–29)
MAGNESIUM SERPL-MCNC: 1.7 MG/DL (ref 1.7–2.3)
POTASSIUM SERPL-SCNC: 3.3 MMOL/L (ref 3.4–5.3)
POTASSIUM SERPL-SCNC: 3.7 MMOL/L (ref 3.4–5.3)
SODIUM SERPL-SCNC: 140 MMOL/L (ref 135–145)

## 2025-03-13 PROCEDURE — 250N000013 HC RX MED GY IP 250 OP 250 PS 637: Performed by: INTERNAL MEDICINE

## 2025-03-13 PROCEDURE — 250N000013 HC RX MED GY IP 250 OP 250 PS 637: Performed by: HOSPITALIST

## 2025-03-13 PROCEDURE — 99233 SBSQ HOSP IP/OBS HIGH 50: CPT | Performed by: INTERNAL MEDICINE

## 2025-03-13 PROCEDURE — 250N000013 HC RX MED GY IP 250 OP 250 PS 637: Performed by: PHYSICIAN ASSISTANT

## 2025-03-13 PROCEDURE — 250N000013 HC RX MED GY IP 250 OP 250 PS 637: Performed by: NURSE PRACTITIONER

## 2025-03-13 PROCEDURE — 83735 ASSAY OF MAGNESIUM: CPT | Performed by: INTERNAL MEDICINE

## 2025-03-13 PROCEDURE — 250N000011 HC RX IP 250 OP 636: Performed by: HOSPITALIST

## 2025-03-13 PROCEDURE — 250N000011 HC RX IP 250 OP 636: Performed by: PHYSICIAN ASSISTANT

## 2025-03-13 PROCEDURE — 120N000001 HC R&B MED SURG/OB

## 2025-03-13 PROCEDURE — 250N000009 HC RX 250: Performed by: INTERNAL MEDICINE

## 2025-03-13 PROCEDURE — 258N000003 HC RX IP 258 OP 636: Performed by: NURSE PRACTITIONER

## 2025-03-13 PROCEDURE — 36415 COLL VENOUS BLD VENIPUNCTURE: CPT | Performed by: INTERNAL MEDICINE

## 2025-03-13 PROCEDURE — 80048 BASIC METABOLIC PNL TOTAL CA: CPT | Performed by: INTERNAL MEDICINE

## 2025-03-13 PROCEDURE — 84132 ASSAY OF SERUM POTASSIUM: CPT | Performed by: INTERNAL MEDICINE

## 2025-03-13 PROCEDURE — 250N000011 HC RX IP 250 OP 636: Performed by: NURSE PRACTITIONER

## 2025-03-13 RX ORDER — POTASSIUM CHLORIDE 1500 MG/1
40 TABLET, EXTENDED RELEASE ORAL ONCE
Status: COMPLETED | OUTPATIENT
Start: 2025-03-13 | End: 2025-03-13

## 2025-03-13 RX ORDER — POTASSIUM CHLORIDE 7.45 MG/ML
10 INJECTION INTRAVENOUS
Status: DISPENSED | OUTPATIENT
Start: 2025-03-13 | End: 2025-03-13

## 2025-03-13 RX ORDER — POTASSIUM CHLORIDE 7.45 MG/ML
10 INJECTION INTRAVENOUS
Status: DISCONTINUED | OUTPATIENT
Start: 2025-03-13 | End: 2025-03-13

## 2025-03-13 RX ORDER — SCOPOLAMINE 1 MG/3D
1 PATCH, EXTENDED RELEASE TRANSDERMAL
Status: DISCONTINUED | OUTPATIENT
Start: 2025-03-13 | End: 2025-03-14 | Stop reason: HOSPADM

## 2025-03-13 RX ORDER — POTASSIUM CHLORIDE 1.5 G/1.58G
40 POWDER, FOR SOLUTION ORAL ONCE
Status: COMPLETED | OUTPATIENT
Start: 2025-03-13 | End: 2025-03-13

## 2025-03-13 RX ADMIN — ENOXAPARIN SODIUM 40 MG: 40 INJECTION SUBCUTANEOUS at 09:27

## 2025-03-13 RX ADMIN — PROCHLORPERAZINE EDISYLATE 5 MG: 5 INJECTION INTRAMUSCULAR; INTRAVENOUS at 09:52

## 2025-03-13 RX ADMIN — ACETAMINOPHEN 650 MG: 325 SUSPENSION ORAL at 08:18

## 2025-03-13 RX ADMIN — AMPICILLIN SODIUM 1 G: 1 INJECTION, POWDER, FOR SOLUTION INTRAMUSCULAR; INTRAVENOUS at 14:41

## 2025-03-13 RX ADMIN — LOSARTAN POTASSIUM 50 MG: 50 TABLET, FILM COATED ORAL at 08:11

## 2025-03-13 RX ADMIN — AMPICILLIN SODIUM 1 G: 1 INJECTION, POWDER, FOR SOLUTION INTRAMUSCULAR; INTRAVENOUS at 03:41

## 2025-03-13 RX ADMIN — DULOXETINE HYDROCHLORIDE 20 MG: 20 CAPSULE, DELAYED RELEASE ORAL at 08:11

## 2025-03-13 RX ADMIN — GABAPENTIN 900 MG: 300 CAPSULE ORAL at 08:10

## 2025-03-13 RX ADMIN — LATANOPROST 1 DROP: 50 SOLUTION OPHTHALMIC at 21:28

## 2025-03-13 RX ADMIN — ACETAMINOPHEN 650 MG: 325 SUSPENSION ORAL at 16:36

## 2025-03-13 RX ADMIN — SODIUM CHLORIDE, SODIUM LACTATE, POTASSIUM CHLORIDE, AND CALCIUM CHLORIDE: .6; .31; .03; .02 INJECTION, SOLUTION INTRAVENOUS at 03:35

## 2025-03-13 RX ADMIN — BACLOFEN 20 MG: 10 TABLET ORAL at 08:10

## 2025-03-13 RX ADMIN — SCOLOPAMINE TRANSDERMAL SYSTEM 1 PATCH: 1 PATCH, EXTENDED RELEASE TRANSDERMAL at 13:13

## 2025-03-13 RX ADMIN — ONDANSETRON 4 MG: 2 INJECTION, SOLUTION INTRAMUSCULAR; INTRAVENOUS at 04:21

## 2025-03-13 RX ADMIN — ACETAMINOPHEN 650 MG: 325 SUSPENSION ORAL at 00:41

## 2025-03-13 RX ADMIN — AMPICILLIN SODIUM 1 G: 1 INJECTION, POWDER, FOR SOLUTION INTRAMUSCULAR; INTRAVENOUS at 21:11

## 2025-03-13 RX ADMIN — AMLODIPINE BESYLATE 5 MG: 5 TABLET ORAL at 08:08

## 2025-03-13 RX ADMIN — ARMODAFINIL 200 MG: 150 TABLET ORAL at 08:08

## 2025-03-13 RX ADMIN — AMPICILLIN SODIUM 1 G: 1 INJECTION, POWDER, FOR SOLUTION INTRAMUSCULAR; INTRAVENOUS at 09:27

## 2025-03-13 RX ADMIN — LEVOTHYROXINE SODIUM 112 MCG: 112 TABLET ORAL at 08:11

## 2025-03-13 RX ADMIN — DULOXETINE HYDROCHLORIDE 60 MG: 60 CAPSULE, DELAYED RELEASE ORAL at 08:11

## 2025-03-13 RX ADMIN — POTASSIUM CHLORIDE 40 MEQ: 1.5 POWDER, FOR SOLUTION ORAL at 13:13

## 2025-03-13 RX ADMIN — SODIUM CHLORIDE, SODIUM LACTATE, POTASSIUM CHLORIDE, AND CALCIUM CHLORIDE: .6; .31; .03; .02 INJECTION, SOLUTION INTRAVENOUS at 14:41

## 2025-03-13 ASSESSMENT — ACTIVITIES OF DAILY LIVING (ADL)
ADLS_ACUITY_SCORE: 82
ADLS_ACUITY_SCORE: 82
ADLS_ACUITY_SCORE: 78
ADLS_ACUITY_SCORE: 86
ADLS_ACUITY_SCORE: 82
ADLS_ACUITY_SCORE: 78
ADLS_ACUITY_SCORE: 86
ADLS_ACUITY_SCORE: 78
ADLS_ACUITY_SCORE: 82
ADLS_ACUITY_SCORE: 86
ADLS_ACUITY_SCORE: 82
ADLS_ACUITY_SCORE: 78
ADLS_ACUITY_SCORE: 86
ADLS_ACUITY_SCORE: 86
ADLS_ACUITY_SCORE: 82
ADLS_ACUITY_SCORE: 78
ADLS_ACUITY_SCORE: 82
ADLS_ACUITY_SCORE: 86
ADLS_ACUITY_SCORE: 86
ADLS_ACUITY_SCORE: 82
ADLS_ACUITY_SCORE: 82

## 2025-03-13 NOTE — PLAN OF CARE
Goal Outcome Evaluation:    PRIMARY Concern: Weakness, UTI+, N/V/D   SAFETY RISK Concerns (fall risk, behaviors, etc.): Fall risk  Aggression Tool Color: Green   Isolation/Type: Enteric r/o   Tests/Procedures for NEXT shift: K+ recheck   Consults? (Pending/following, signed-off?) PT/CC following   Where is patient from? (Home, TCU, etc.): Home   Other Important info for NEXT shift: Scope patch behind L ear. Pt unable to keep anything down at this morning, improving now, Ensure supplement ordered   Anticipated DC date & active delays: TBD  _____________________________________________________________________________  SUMMARY NOTE:  Orientation/Cognitive: AxOx4, flat affect   Observation Goals (Met/ Not Met): Inpt  Mobility Level/Assist Equipment: WC bound at baseline. A2 lift. PRN repo   Antibiotics & Plan (IV/po, length of tx left): IV Ampicillin Q6hr   Pain Management: Denies, Compazine given for nausea   Complete Pain Reassessment: Y/N Yes  Due next: Next shift   Tele/VS/O2: VSS on RA   ABNL Lab/BG: K+ 3.3, replaced, recheck at 1745, Mag recheck in AM.   Diet: Regular, poor intake    Bowel/Bladder: Incontinent of B&B, purewick per pt request. No BM this shift  Skin Concerns: Scattered bruising. Sacrum Coccyx blanchable redness. (Mepi in place)   Drains/Devices: PIV infusing LR at 75ml/hr  Patient Stated Goal for Today: Rest

## 2025-03-13 NOTE — PROGRESS NOTES
[facility]    Medicine Progress Note - Hospitalist Service    Date of Admission:  3/9/2025    Assessment & Plan   Marylee Woods is a 69 year old female with significant past medical history for multiple sclerosis wheelchair bound, malignant neoplasm of left breast status postlumpectomy in 2022, obstructive sleep apnea, hypertension, anemia, and depression admitted on 3/10/25 with cystitis and colitis.     Acute cystitis  *Diagnosed with cystitis via PCP on 3/6 and prescribed Amoxicillin on 3/7. Urine culture grew Enterococcus faecalis. She states she took only 3 doses prior to ED visit 3/9. Reported ongoing dysuria.  *Afebrile, no leukocytosis, normal lactic acid. CT A/P shows evidence of cystitis.  - Continue IV Ampicillin  - Urine culture no growth (note she was on abx prior)     Acute colitis likely due to Norovirus  *Patient reports family members with Norovirus. She endorsed N/V/D. No melena or hematochezia. Diarrhea has since been improving per patient but she remains very nauseous.  *CT A/P showed findings of colitis in the transverse, descending and sigmoid colon.  *No further diarrhea but ongoing nausea with poor oral intake   - Antiemetics PRN  - Continue IVF until oral intake improves.  Encourage antiemetics and ADAT if tolerated  3/13: Add scopolamine patch    Generalized weakness  - PT recommend home with assist     Hypokalemia  Hypomagnesemia  - RN replacement protocol     Multiple sclerosis  Wheelchair-bound  *Follows with Dr. Redman and receives Briumvi infusions as outpatient. At baseline, utilizes wheelchair and slide board for transfers.  - Continue PTA baclofen and gabapentin      Hypertension  - Continue PTA amlodopine and losartan  - IV hydralazine for SBP >180     Depression  - Continue PTA cymbalta     History of malignant neoplasm left breast status postlumpectomy  *Follows with Dr. Martinez oncology     Obstructive sleep apnea  - Continue PTA armodafinil    "  Hypothyroidism  - Continue PTA levothyroxine      Glaucoma   - Continue PTA latanoprost           Diet: Regular Diet Adult  Snacks/Supplements Adult: Ensure Enlive; With Meals    DVT Prophylaxis: Enoxaparin (Lovenox) SQ  Verduzco Catheter: Not present  Lines: None     Cardiac Monitoring: None  Code Status: Full Code      Clinically Significant Risk Factors        # Hypokalemia: Lowest K = 3.3 mmol/L in last 2 days, will replace as needed      # Hypomagnesemia: Lowest Mg = 1.4 mg/dL in last 2 days, will replace as needed   # Hypoalbuminemia: Lowest albumin = 3.4 g/dL at 3/10/2025  8:07 AM, will monitor as appropriate     # Hypertension: Noted on problem list                # Financial/Environmental Concerns: none         Social Drivers of Health    Tobacco Use: Medium Risk (2/19/2025)    Patient History     Smoking Tobacco Use: Former     Smokeless Tobacco Use: Never     Passive Exposure: Current   Physical Activity: Inactive (4/2/2024)    Exercise Vital Sign     Days of Exercise per Week: 0 days     Minutes of Exercise per Session: 20 min   Social Connections: Unknown (4/2/2024)    Social Connection and Isolation Panel [NHANES]     Frequency of Social Gatherings with Friends and Family: Once a week          Disposition Plan     Medically Ready for Discharge: Anticipated Tomorrow if no further nausea and vomiting and tolerating p.o.       Basil Bacon MD  Hospitalist Service  [facility]  Securely message with [employer] (more info)  Text page via [employer] Paging/Directory   _\"This dictation was performed with voice recognition software and may contain errors,  omissions and inadvertent word substitution.\"    _____________________________________________________________________    Interval History   Continues with significant nausea.  Minimal oral intake  Diarrhea with some improvement    Physical Exam   BP (!) 158/91 (BP Location: Right arm, Patient Position: Semi-Liriano's)   Pulse 85   Temp 99 " " F (37.2  C) (Oral)   Resp 16   Ht 1.727 m (5' 8\")   Wt 62.2 kg (137 lb 2 oz)   LMP 01/31/2004 (Approximate)   SpO2 98%   BMI 20.85 kg/m    Gen- pleasant   Neck- supple  CVS- I+II+ no m/r/g  RS- CTAB  Abdo- soft, diffuse mild tenderness. No g/r/r   Ext- no edema     Medical Decision Making       51 MINUTES SPENT BY ME on the date of service doing chart review, history, exam, documentation & further activities per the note.      Data   ------------------------- PAST 24 HR DATA REVIEWED -----------------------------------------------    I have personally reviewed the following data over the past 24 hrs:    N/A  \   N/A   / N/A     140 103 5.1 (L) /  115 (H)   3.3 (L) 27 0.68 \       Imaging results reviewed over the past 24 hrs:   No results found for this or any previous visit (from the past 24 hours).  "

## 2025-03-13 NOTE — PLAN OF CARE
[id number]  PRIMARY Concern: Norovirus   SAFETY RISK Concerns (fall risk, behaviors, etc.): fall risk      Aggression Tool Color: green  Isolation/Type: enteric   Tests/Procedures for NEXT shift: labs   Consults? (Pending/following, signed-off?) CM/SW following   Where is patient from? (Home, TCU, etc.): home  Other Important info for NEXT shift: patient's back pain improves when she is up in recliner   Anticipated DC date & active delays: TBD  _____________________________________________________________________________  SUMMARY NOTE:   Orientation/Cognitive: A&Ox4  Mobility Level/Assist Equipment: lift Ax2  Antibiotics & Plan (IV/po, length of tx left): IV abx Q6H  Pain Management: PRN liquid Tylenol given for back pain   Complete Pain Reassessment: Y/N Y Due next: n/a  Tele/VS/O2: VSS on RA  ABNL Lab/BG: n/a  Diet: regular   Bowel/Bladder: incontinent, purewick in place  Skin Concerns: blanchable redness on coccyx/sacrum, scattered bruising   Drains/Devices: IV infusing LR @75 on left wrist, left AC IV-SL  Patient Stated Goal for Today: pain control

## 2025-03-13 NOTE — PROGRESS NOTES
PRIMARY Concern: Weakness, UTI+, N/V/D   SAFETY RISK Concerns (fall risk, behaviors, etc.): Fall risk  Aggression Tool Color: Green   Isolation/Type: Enteric r/o   Tests/Procedures for NEXT shift: Stool sample needed  Consults? (Pending/following, signed-off?) PT/CC following   Where is patient from? (Home, TCU, etc.): Home   Other Important info for NEXT shift: Refusing evening oral medications. Requesting to be left to sleep overnight with awakenings minimized.  Anticipated DC date & active delays: TBD  _____________________________________________________________________________  SUMMARY NOTE:  Pt requested for writer to allow her to sleep overnight, with minimal disruptions.   Pt declining most PM medications. Agreed to take BP medication.  PRN Melatonin given at HS. Around 0045, pt complaining of headache. Tylenol given. Zofran given for nausea   Orientation/Cognitive: AxOx4  Observation Goals (Met/ Not Met): Inpt  Mobility Level/Assist Equipment: WC bound at baseline. A2 lift. PRN repo   Antibiotics & Plan (IV/po, length of tx left): IV Ampicillin Q6hr   Pain Management: Denies   Complete Pain Reassessment: Y/N Yes  Due next: Next shift   Tele/VS/O2: VSS on RA   ABNL Lab/BG: K and Mag protocol. Rechecks in AM 3/13  Diet: Clears   Bowel/Bladder: Incontinent of B&B, purewick @ night. Incontinent of stool early this morning   Skin Concerns: Scattered bruising. Sacrum Coccyx blanchable redness.   Drains/Devices: PIV infusing LR at 75ml/hr  Patient Stated Goal for Today: Rest

## 2025-03-14 VITALS
TEMPERATURE: 98.6 F | BODY MASS INDEX: 20.78 KG/M2 | HEART RATE: 73 BPM | WEIGHT: 137.13 LBS | HEIGHT: 68 IN | RESPIRATION RATE: 16 BRPM | DIASTOLIC BLOOD PRESSURE: 68 MMHG | SYSTOLIC BLOOD PRESSURE: 164 MMHG | OXYGEN SATURATION: 93 %

## 2025-03-14 LAB
ANION GAP SERPL CALCULATED.3IONS-SCNC: 8 MMOL/L (ref 7–15)
BACTERIA BLD CULT: NO GROWTH
BACTERIA BLD CULT: NO GROWTH
BUN SERPL-MCNC: 5.1 MG/DL (ref 8–23)
CALCIUM SERPL-MCNC: 9.1 MG/DL (ref 8.8–10.4)
CHLORIDE SERPL-SCNC: 104 MMOL/L (ref 98–107)
CREAT SERPL-MCNC: 0.7 MG/DL (ref 0.51–0.95)
EGFRCR SERPLBLD CKD-EPI 2021: >90 ML/MIN/1.73M2
ERYTHROCYTE [DISTWIDTH] IN BLOOD BY AUTOMATED COUNT: 13.3 % (ref 10–15)
GLUCOSE SERPL-MCNC: 100 MG/DL (ref 70–99)
HCO3 SERPL-SCNC: 26 MMOL/L (ref 22–29)
HCT VFR BLD AUTO: 29.6 % (ref 35–47)
HGB BLD-MCNC: 9.8 G/DL (ref 11.7–15.7)
MAGNESIUM SERPL-MCNC: 1.4 MG/DL (ref 1.7–2.3)
MCH RBC QN AUTO: 28.3 PG (ref 26.5–33)
MCHC RBC AUTO-ENTMCNC: 33.1 G/DL (ref 31.5–36.5)
MCV RBC AUTO: 86 FL (ref 78–100)
PLATELET # BLD AUTO: 247 10E3/UL (ref 150–450)
POTASSIUM SERPL-SCNC: 3.9 MMOL/L (ref 3.4–5.3)
RBC # BLD AUTO: 3.46 10E6/UL (ref 3.8–5.2)
SODIUM SERPL-SCNC: 138 MMOL/L (ref 135–145)
WBC # BLD AUTO: 6.3 10E3/UL (ref 4–11)

## 2025-03-14 PROCEDURE — 250N000011 HC RX IP 250 OP 636: Performed by: NURSE PRACTITIONER

## 2025-03-14 PROCEDURE — 80048 BASIC METABOLIC PNL TOTAL CA: CPT | Performed by: INTERNAL MEDICINE

## 2025-03-14 PROCEDURE — 36415 COLL VENOUS BLD VENIPUNCTURE: CPT | Performed by: INTERNAL MEDICINE

## 2025-03-14 PROCEDURE — 85018 HEMOGLOBIN: CPT | Performed by: INTERNAL MEDICINE

## 2025-03-14 PROCEDURE — 250N000013 HC RX MED GY IP 250 OP 250 PS 637: Performed by: HOSPITALIST

## 2025-03-14 PROCEDURE — 83735 ASSAY OF MAGNESIUM: CPT | Performed by: INTERNAL MEDICINE

## 2025-03-14 PROCEDURE — 250N000011 HC RX IP 250 OP 636: Performed by: PHYSICIAN ASSISTANT

## 2025-03-14 PROCEDURE — 85048 AUTOMATED LEUKOCYTE COUNT: CPT | Performed by: INTERNAL MEDICINE

## 2025-03-14 PROCEDURE — 258N000003 HC RX IP 258 OP 636: Performed by: NURSE PRACTITIONER

## 2025-03-14 RX ORDER — MAGNESIUM SULFATE HEPTAHYDRATE 40 MG/ML
4 INJECTION, SOLUTION INTRAVENOUS ONCE
Status: COMPLETED | OUTPATIENT
Start: 2025-03-14 | End: 2025-03-14

## 2025-03-14 RX ORDER — SCOPOLAMINE 1 MG/3D
1 PATCH, EXTENDED RELEASE TRANSDERMAL
Qty: 2 PATCH | Refills: 0 | Status: SHIPPED | OUTPATIENT
Start: 2025-03-16 | End: 2025-03-22

## 2025-03-14 RX ORDER — ONDANSETRON 4 MG/1
4 TABLET, ORALLY DISINTEGRATING ORAL EVERY 6 HOURS PRN
Qty: 12 TABLET | Refills: 0 | Status: SHIPPED | OUTPATIENT
Start: 2025-03-14

## 2025-03-14 RX ORDER — AMOXICILLIN 500 MG/1
500 CAPSULE ORAL 3 TIMES DAILY
Qty: 9 CAPSULE | Refills: 0 | Status: SHIPPED | OUTPATIENT
Start: 2025-03-14 | End: 2025-03-17

## 2025-03-14 RX ADMIN — BACLOFEN 20 MG: 10 TABLET ORAL at 09:51

## 2025-03-14 RX ADMIN — SODIUM CHLORIDE, SODIUM LACTATE, POTASSIUM CHLORIDE, AND CALCIUM CHLORIDE: .6; .31; .03; .02 INJECTION, SOLUTION INTRAVENOUS at 04:12

## 2025-03-14 RX ADMIN — AMPICILLIN SODIUM 1 G: 1 INJECTION, POWDER, FOR SOLUTION INTRAMUSCULAR; INTRAVENOUS at 08:14

## 2025-03-14 RX ADMIN — ONDANSETRON 4 MG: 4 TABLET, ORALLY DISINTEGRATING ORAL at 08:13

## 2025-03-14 RX ADMIN — AMPICILLIN SODIUM 1 G: 1 INJECTION, POWDER, FOR SOLUTION INTRAMUSCULAR; INTRAVENOUS at 04:13

## 2025-03-14 ASSESSMENT — ACTIVITIES OF DAILY LIVING (ADL)
ADLS_ACUITY_SCORE: 82

## 2025-03-14 NOTE — DISCHARGE SUMMARY
PRIMARY Concern: Here with N/V/D, admitted for acute Colitis likely due to Noro virus.Hx of MS, W/C bound baseline.Weakness, UTI+ culture grew enterococcus faecalis  SAFETY RISK Concerns (fall risk, behaviors, etc.): Fall risk  Aggression Tool Color: Green   Isolation/Type: Enteric r/o   Tests/Procedures for NEXT shift: labs   Consults? (Pending/following, signed-off?) PT signed off, rec home with assist, CC following   Where is patient from? (Home, TCU, etc.): Home   Other Important info for NEXT shift: Scope patch behind L ear.   Anticipated DC date & active delays: 3/14/25   _____________________________________________________________________________  SUMMARY NOTE:  Orientation/Cognitive: AxOx4,  Observation Goals (Met/ Not Met): Inpt  Mobility Level/Assist Equipment: WC bound at baseline. Ax2 lift. PRN repo per request  Antibiotics & Plan (IV/po, length of tx left): IV Ampicillin Q6hr   Pain Management: Reports mild back pain, declines any med, denies nausea this shift but still refused PO meds  Complete Pain Reassessment: Y Due next: shift   Tele/VS/O2: VSS on RA   ABNL Lab/BG: AM labs  Diet: Regular  Bowel/Bladder: Incontinent of B&B, pure wick in place, good UOP , no BM this shift  Skin Concerns: Scattered bruising.   Drains/Devices: None   Patient Stated Goal for Today: Rest    Patient cleared to discharge home by hospitalist. PIVs removed. AVS printed and reviewed with patient; all questions answered. Medications reviewed with patient. Patient changed into home clothing and all belongings gathered. Patient brought down to door 2 by transport.

## 2025-03-14 NOTE — PLAN OF CARE
Goal Outcome Evaluation:      Plan of Care Reviewed With: patient    Overall Patient Progress: improving     PRIMARY Concern: Here with N/V/D, admitted for acute Colitis likely due to Noro virus.Hx of MS, W/C bound baseline.Weakness, UTI+ culture grew enterococcus faecalis  SAFETY RISK Concerns (fall risk, behaviors, etc.): Fall risk  Aggression Tool Color: Green   Isolation/Type: Enteric r/o   Tests/Procedures for NEXT shift: labs   Consults? (Pending/following, signed-off?) PT signed off, rec home with assist, CC following   Where is patient from? (Home, TCU, etc.): Home   Other Important info for NEXT shift: Pt continues to refuse all PO meds, denies any nausea this shift but still declined to take PO meds. Scope patch behind L ear.   Anticipated DC date & active delays: Awaits clinical improvement  _____________________________________________________________________________  SUMMARY NOTE:  Orientation/Cognitive: AxOx4,  Observation Goals (Met/ Not Met): Inpt  Mobility Level/Assist Equipment: WC bound at baseline. Ax2 lift. PRN repo per request  Antibiotics & Plan (IV/po, length of tx left): IV Ampicillin Q6hr   Pain Management: Reports mild back pain, declines any med, denies nausea this shift but still refused PO meds  Complete Pain Reassessment: Y Due next: shift   Tele/VS/O2: VSS on RA   ABNL Lab/BG: AM labs  Diet: Regular  Bowel/Bladder: Incontinent of B&B, pure wick in place, good UOP , no BM this shift  Skin Concerns: Scattered bruising.   Drains/Devices: PIV infusing LR at 75ml/hr  Patient Stated Goal for Today: Rest

## 2025-03-14 NOTE — DISCHARGE SUMMARY
[facility]  Hospitalist Discharge Summary      Date of Admission:  3/9/2025  Date of Discharge:  3/14/2025  Discharging Provider: Basil Bacon MD  Discharge Service: Hospitalist Service    Discharge Diagnoses     Acute cystitis    Acute colitis likely due to Norovirus    Generalized weakness    Hypokalemia  Hypomagnesemia    Multiple sclerosis  Wheelchair-bound    Hypertension    Depression    History of malignant neoplasm left breast status postlumpectomy    Obstructive sleep apnea    Hypothyroidism    Glaucoma     Clinically Significant Risk Factors          Follow-ups Needed After Discharge   Follow-up Appointments       Hospital Follow-up with Existing Primary Care Provider (PCP)      Please see details below         Schedule Primary Care visit within: 30 Days             Unresulted Labs Ordered in the Past 30 Days of this Admission       Date and Time Order Name Status Description    3/9/2025  8:27 AM Blood Culture Peripheral Blood Preliminary     3/9/2025  8:27 AM Blood Culture Peripheral Blood Preliminary             Discharge Disposition   Discharged to home  Condition at discharge: Stable    Hospital Course   Marylee Woods is a 69 year old female with significant past medical history for multiple sclerosis wheelchair bound, malignant neoplasm of left breast status postlumpectomy in 2022, obstructive sleep apnea, hypertension, anemia, and depression admitted on 3/10/25 with cystitis and colitis.     Acute cystitis  *Diagnosed with cystitis via PCP on 3/6 and prescribed Amoxicillin on 3/7. Urine culture grew Enterococcus faecalis. She states she took only 3 doses prior to ED visit 3/9. Reported ongoing dysuria.  *Afebrile, no leukocytosis, normal lactic acid. CT A/P shows evidence of cystitis.  - Continued IV Ampicillin and changed to p.o. amoxicillin on discharge     Acute colitis likely due to Norovirus  *Patient reports family members with Norovirus. She endorsed N/V/D. No  melena or hematochezia. Diarrhea has since been improving per patient but she remains very nauseous.  *CT A/P showed findings of colitis in the transverse, descending and sigmoid colon.  *No further diarrhea but ongoing nausea with poor oral intake   -Managed conservatively and was able to tolerate diet before discharge     Generalized weakness  -Evaluated by PT     Hypokalemia  Hypomagnesemia  - RN replacement protocol     Multiple sclerosis  Wheelchair-bound  *Follows with Dr. Redman and receives Briumvi infusions as outpatient. At baseline, utilizes wheelchair and slide board for transfers.  - Continued PTA baclofen and gabapentin      Hypertension  - Continued PTA amlodopine and losartan     Depression  - Continued PTA cymbalta     History of malignant neoplasm left breast status postlumpectomy  *Follows with Dr. Martinez oncology     Obstructive sleep apnea  - Continued PTA armodafinil     Hypothyroidism  - Continued PTA levothyroxine      Glaucoma   - Continued PTA latanoprost     Consultations This Hospital Stay   CARE MANAGEMENT / SOCIAL WORK IP CONSULT  PHYSICAL THERAPY ADULT IP CONSULT  OCCUPATIONAL THERAPY ADULT IP CONSULT  CARE MANAGEMENT / SOCIAL WORK IP CONSULT    Code Status   Full Code    Time Spent on this Encounter   I, Basil Bacon MD, personally saw the patient today and spent greater than 30 minutes discharging this patient.       Basil Bacon MD  [facility] EXTENDED RECOVERY AND SHORT STAY  [address] [place] [id number]  Phone: [phone]  ______________________________________________________________________    Physical Exam   Vital Signs: Temp: 98.6  F (37  C) Temp src: Oral BP: (!) 164/68 Pulse: 73   Resp: 16 SpO2: 93 % O2 Device: None (Room air)    Weight: 137 lbs 2.02 oz  Gen- pleasant   Neck- supple  CVS- I+II+ no m/r/g  RS- CTAB  Abdo- soft, diffuse mild tenderness. No g/r/r   Ext- no edema        Primary Care Physician   Carolina Paredes  [place]    Discharge Orders      Primary Care - Care Coordination Referral      Reason for your hospital stay    Marylee Woods is a 69 year old female with significant past medical history for multiple sclerosis wheelchair bound, malignant neoplasm of left breast status postlumpectomy in 2022, obstructive sleep apnea, hypertension, anemia, and depression admitted on 3/10/25 with cystitis and colitis.     Activity    Your activity upon discharge: activity as tolerated     Tubes and Drains    Current Tubes and Drains:     Drain  Duration           Urinary Drain 03/10/25 External Catheter 3 days              When to contact your care team    Call your primary doctor if you have any of the following: temperature greater than 100.4 or less than 96,  increased shortness of breath, or increased pain.     Diet    Follow this diet upon discharge: Current Diet:Orders Placed This Encounter      Snacks/Supplements Adult: Ensure Enlive; With Meals      Regular Diet Adult     Hospital Follow-up with Existing Primary Care Provider (PCP)    Please see details below            Significant Results and Procedures   Results for orders placed or performed during the hospital encounter of 03/09/25   CT Abdomen Pelvis w Contrast    Narrative    EXAM: CT ABDOMEN PELVIS W CONTRAST  LOCATION: [facility]  DATE: 3/9/2025    INDICATION: check for pyelonephritis, Appendicitis  RUQ pain and N V  COMPARISON: CT abdomen pelvis 6/30/2021  TECHNIQUE: CT scan of the abdomen and pelvis was performed following injection of IV contrast. Multiplanar reformats were obtained. Dose reduction techniques were used.  CONTRAST: 101mL Isovue 370    FINDINGS:   LOWER CHEST: Small hiatal hernia.    HEPATOBILIARY: Unremarkable liver. Cholecystectomy. No biliary dilatation.    PANCREAS: Normal.    SPLEEN: Normal.    ADRENAL GLANDS: Normal.    KIDNEYS/BLADDER: No significant mass, stones, or hydronephrosis. There are simple or benign cysts.  No follow up is needed. Normal enhancement of kidneys with no evidence of pyelonephritis. There is diffuse mucosal hyperenhancement of urinary bladder   which may represent cystitis.    BOWEL: Small hiatal hernia. Unremarkable small bowel. No evidence of appendicitis. Wall thickening of the transverse, descending and sigmoid colon suspicious for colitis.    LYMPH NODES: Normal.    VASCULATURE: Moderate vascular calcifications.    PELVIC ORGANS: Normal.    MUSCULOSKELETAL: ORIF right proximal femur.      Impression    IMPRESSION:   1.  Mucosal enhancement of the urinary bladder may represent cystitis. No evidence of pyelonephritis.  2.  Findings suspicious for colitis, likely infectious/inflammatory.         *Note: Due to a large number of results and/or encounters for the requested time period, some results have not been displayed. A complete set of results can be found in Results Review.       Discharge Medications   Current Discharge Medication List        START taking these medications    Details   ondansetron (ZOFRAN ODT) 4 MG ODT tab Take 1 tablet (4 mg) by mouth every 6 hours as needed for nausea or vomiting.  Qty: 12 tablet, Refills: 0    Associated Diagnoses: Acute colitis      scopolamine (TRANSDERM) 1 MG/3DAYS 72 hr patch Place 1 patch over 72 hours onto the skin every 72 hours for 6 days.  Qty: 2 patch, Refills: 0    Associated Diagnoses: Acute colitis           CONTINUE these medications which have CHANGED    Details   amoxicillin (AMOXIL) 500 MG capsule Take 1 capsule (500 mg) by mouth 3 times daily for 3 days.  Qty: 9 capsule, Refills: 0    Associated Diagnoses: UTI (urinary tract infection) due to Enterococcus           CONTINUE these medications which have NOT CHANGED    Details   acetaminophen (TYLENOL) 500 MG tablet Take 1-2 tablets (500-1,000 mg) by mouth every 8 hours as needed for mild pain or fever (greater than 101 degrees)      acyclovir (ZOVIRAX) 400 MG tablet TAKE 1 TABLET BY MOUTH EVERY 12  HOURS  Qty: 180 tablet, Refills: 2    Associated Diagnoses: HSV (herpes simplex virus) infection      amLODIPine (NORVASC) 5 MG tablet Take 1 tablet by mouth once daily  Qty: 90 tablet, Refills: 2    Associated Diagnoses: Hypertension goal BP (blood pressure) < 140/90      Armodafinil 200 MG TABS Take 200 mg by mouth every morning      baclofen (LIORESAL) 10 MG tablet Take 2 tablets (20 mg) in the morning and 3 tablets (30 mg) in the evening.      Calcium Carbonate Antacid 600 MG CHEW Take 600 mg by mouth daily      CRANBERRY PO Take by mouth. Take 1 capsule once daily.      !! DULoxetine (CYMBALTA) 20 MG capsule TAKE 1 CAPSULE BY MOUTH ONCE DAILY ALONG  WITH  A  60  MG  TABLET  FOR  A  TOTAL  DOSE  OF  80  MG  DAILY  Qty: 90 capsule, Refills: 4    Associated Diagnoses: Moderate episode of recurrent major depressive disorder (H)      famotidine (PEPCID) 40 MG tablet TAKE 1 TABLET BY MOUTH AT BEDTIME  Qty: 90 tablet, Refills: 2    Associated Diagnoses: Gastroesophageal reflux disease without esophagitis      gabapentin (NEURONTIN) 300 MG capsule Take 3 capsules (900 mg) by mouth 2 times daily    Associated Diagnoses: MS (multiple sclerosis) (H)      latanoprost (XALATAN) 0.005 % ophthalmic solution INSTILL 1 DROP INTO BOTH EYES EVERY DAY AS DIRECTED PT WILL NEED TO BE SEEN FOR FUTURE REFILLS      levothyroxine (SYNTHROID/LEVOTHROID) 112 MCG tablet Take 1 tablet (112 mcg) by mouth daily  Qty: 90 tablet, Refills: 3    Associated Diagnoses: Postablative hypothyroidism      losartan (COZAAR) 50 MG tablet Take 1 tablet (50 mg) by mouth 2 times daily  Qty: 180 tablet, Refills: 4    Associated Diagnoses: Hypertension goal BP (blood pressure) < 140/90      Multiple Vitamins-Minerals (MULTIPLE VITAMINS/WOMENS PO) Take by mouth daily      nitroFURantoin macrocrystal-monohydrate (MACROBID) 100 MG capsule Take 1 capsule (100 mg) by mouth daily  Qty: 90 capsule, Refills: 4    Associated Diagnoses: Acute cystitis without hematuria       !! DULoxetine (CYMBALTA) 60 MG capsule Take 1 capsule by mouth once daily  Qty: 90 capsule, Refills: 4    Associated Diagnoses: Moderate episode of recurrent major depressive disorder (H)       !! - Potential duplicate medications found. Please discuss with provider.        STOP taking these medications       polyethylene glycol (MIRALAX) 17 GM/Dose powder Comments:   Reason for Stopping:             Allergies   Allergies   Allergen Reactions    Bactrim [Sulfamethoxazole-Trimethoprim] Hallucination    Hydrochlorothiazide      Hyponatremia    Sulfamethizole     Amantadine      hallucinations    Budesonide     Budesonide-Formoterol Fumarate Rash

## 2025-03-17 ENCOUNTER — PATIENT OUTREACH (OUTPATIENT)
Dept: CARE COORDINATION | Facility: CLINIC | Age: 70
End: 2025-03-17
Payer: MEDICARE

## 2025-03-17 NOTE — PROGRESS NOTES
Clinic Care Coordination Contact  Transitions of Care Outreach  JUSTIN SINGH called patient on this date to follow-up on how she is doing since recent hospitalization. Patient voiced she is doing better and hasn't had any symptoms of being sick for 4 days. Patient vocalized that she has all her medication and discharge instructions. Patient voiced she has a PCP appointment next month and declined making an earlier appointment. Patient voiced that hospital was okay with her waiting until next month and she voiced she knows PCP books up in advance so doesn't want to see another provider. Patient voiced she hasn't worked on Care Coordination goal of finding therapy since she's been sick. JUSTIN SINGH voiced she will follow-up with patient later this month about goals, etc. Patient voiced no questions or concerns at this time. JUSTIN SINGH remains available as needed.   Chief Complaint   Patient presents with    Clinic Care Coordination - Post Hospital       Most Recent Admission Date: 3/9/2025   Most Recent Admission Diagnosis: Acute pyelonephritis - N10  Acute colitis - K52.9     Most Recent Discharge Date: 3/14/2025   Most Recent Discharge Diagnosis: Acute pyelonephritis - N10  Acute colitis - K52.9  UTI (urinary tract infection) due to Enterococcus - N39.0, B95.2     Transitions of Care Assessment    Discharge Assessment  How are you doing now that you are home?: I'm feeling better  How are your symptoms? (Red Flag symptoms escalate to triage hotline per guidelines): Improved  Do you know how to contact your clinic care team if you have future questions or changes to your health status? : Yes  Were you started on any new medications or were there changes to any of your previous medications? : Yes  Does the patient have all of their medications?: Yes  Do you have questions regarding any of your medications? : No    Post-op (W [facility]A Only)  If the patient had a surgery or procedure, do they have any questions for a nurse?: No    Post-op  (Clinicians Only)  Did the patient have surgery or a procedure: No  Fever: No  Chills: No  Eating & Drinking: eating and drinking without complaints/concerns  PO Intake: regular diet  Bowel Function: normal  Urinary Status: voiding without complaint/concerns      Follow up Plan     Discharge Follow-Up  Discharge follow up appointment scheduled in alignment with recommended follow up timeframe or Transitions of Risk Category? (Low = within 30 days; Moderate= within 14 days; High= within 7 days): Yes  Discharge Follow Up Appointment Date: 04/16/25  Discharge Follow Up Appointment Scheduled with?: Primary Care Provider    Future Appointments   Date Time Provider Department Center   3/20/2025  2:20 PM [facility]   4/15/2025  8:00 AM Estela Flowers MD [facility]   4/16/2025  1:40 PM Carolina Pulliam MD [facility]   5/8/2025  9:15 AM [facility]   5/8/2025 10:00 AM Kenny Martinez MD [facility]   12/15/2025  8:30 AM Neli Bean MD [facility]       Outpatient Plan as outlined on AVS reviewed with patient.    For any urgent concerns, please contact our 24 hour nurse triage line: [phone] ([phone])       HUYEN John  Social Work Care Coordinator  [facility]  [email or web address]  Office: [phone]  Gender pronouns: she/her

## 2025-03-19 ENCOUNTER — NURSE TRIAGE (OUTPATIENT)
Dept: FAMILY MEDICINE | Facility: CLINIC | Age: 70
End: 2025-03-19
Payer: MEDICARE

## 2025-03-19 NOTE — TELEPHONE ENCOUNTER
Nurse Triage SBAR    Is this a 2nd Level Triage? YES, LICENSED PRACTITIONER REVIEW IS REQUIRED    S-(situation):     Red line Transfer    B-(background):       ED/Hospital Admit: 3/9/2025    Per patient and spouse:  MS (multiple sclerosis) (H) - dx'd in 1993, followed by Dr. Redman [facility]  Patient/Spouse called Neurology: Recommending Blood Tests and MRI: can return to ED.    Spouse reports over time, episodes are becoming more frequent and significant.    A-(assessment):     Monday night, went to bed and woke up this AM.  Spouse and patient report (Monday night through last night): patient with words became gibberish, patient calling out for her mother, manual dexterity impaired.    Today: patient with clear thoughts/speech.   Reports current symptom: top of head feels (fuzzy).    R-(recommendations):     Please review and advise: no same day appointment with PCP available today.    Protocol Recommended Disposition:   See in Office Today    Routed to provider    Does the patient meet one of the following criteria for ADS visit consideration?     NOTES: Disposition was determined by the first positive assessment question, therefore all previous assessment questions were negative.         Reason for Disposition   Patient wants to be seen    Additional Information   Negative: SEVERE weakness (e.g., unable to walk or barely able to walk, requires support) and new-onset or getting worse   Negative: Difficult to awaken or acting confused (e.g., disoriented, slurred speech)   Negative: Sudden onset of weakness of the face, arm or leg on one side of the body and present now (Exception: Bell's palsy suspected: weakness on one side of the face developing over hours to days, with no other symptoms.)   Negative: Sudden onset of numbness of the face, arm or leg on one side of the body and present now   Negative: Sudden onset of loss of speech or garbled speech and present now   Negative:  Sounds like a life-threatening emergency to the triager   Negative: Confusion, disorientation, or hallucinations is main symptom   Negative: Dizziness is main symptom   Negative: Followed a head injury within last 3 days   Negative: Headache (with neurologic deficit)   Negative: Unable to urinate (or only a few drops) and bladder feels very full   Negative: Loss of bladder or bowel control (urine or bowel incontinence; wetting self, leaking stool) of new-onset   Negative: Back pain with numbness (loss of sensation) in groin or rectal area   Negative: Neurologic deficit that was brief (now gone), ANY of the following: * Weakness of the face, arm, or leg on one side of the body * Numbness of the face, arm, or leg on one side of the body * Loss of speech or garbled speech   Negative: Patient sounds very sick or weak to the triager   Negative: Neurologic deficit of gradual onset (e.g., days to weeks), ANY of the following: * Weakness of the face, arm, or leg on one side of the body* Numbness of the face, arm, or leg on one side of the body* Loss of speech or garbled speech   Negative: [place] palsy suspected (i.e., weakness only one side of the face, developing over hours to days, no other symptoms)   Negative: Tingling (e.g., pins and needles) of the face, arm or leg on one side of the body, that is present now  (Exceptions: Chronic or recurrent symptom lasting > 4 weeks; or from known cause, such as: bumped elbow, carpal tunnel, pinched nerve.)   Negative: Neck pain (with neurologic deficit)   Negative: Back pain (with neurologic deficit)    Protocols used: Neurologic Deficit-A-OH

## 2025-03-19 NOTE — TELEPHONE ENCOUNTER
As ADS can not accept today and she is no longer having any other symptoms except for top of head fuzzy feelings, ok to monitor today and see how she does.   If worsening of symptoms or not improving by tomorrow - ER.    Otherwise follow up in the clinic tomorrow pending availability.

## 2025-03-19 NOTE — TELEPHONE ENCOUNTER
Writer called patient and relayed plan per Dr. Landaverde.  Patient verbalized understanding and in agreement with plan.    Writer offered the only clinic appt option at [facility] on 3/20/25: 1350 double booked appt with Paco Huerta PA-C. Patient declined appt due to CT scan scheduled at 1400 on 3/20/25.    Patient's spouse joined phone call and requested appt at [facility], along with MRI and labs, which is what patient's Neurologist recommends.  Writer explained to patient and spouse that whichever clinician evaluates patient would be the individual to determine what/if imaging/labs are ordered.  Alternatively, Neurologist could perhaps order imaging and labs.    Patient and spouse verbalized understanding and requested appt for evaluation at [facility].  Writer transferred call with patient and spouse's permission to Central Scheduling.    SAEID LowN, RN-[facility]

## 2025-03-20 ENCOUNTER — OFFICE VISIT (OUTPATIENT)
Dept: FAMILY MEDICINE | Facility: CLINIC | Age: 70
End: 2025-03-20
Payer: MEDICARE

## 2025-03-20 ENCOUNTER — ANCILLARY PROCEDURE (OUTPATIENT)
Dept: CT IMAGING | Facility: CLINIC | Age: 70
End: 2025-03-20
Attending: FAMILY MEDICINE
Payer: MEDICARE

## 2025-03-20 VITALS
OXYGEN SATURATION: 92 % | TEMPERATURE: 97 F | HEIGHT: 68 IN | BODY MASS INDEX: 20.76 KG/M2 | WEIGHT: 137 LBS | SYSTOLIC BLOOD PRESSURE: 107 MMHG | RESPIRATION RATE: 10 BRPM | HEART RATE: 87 BPM | DIASTOLIC BLOOD PRESSURE: 68 MMHG

## 2025-03-20 DIAGNOSIS — E83.42 HYPOMAGNESEMIA: ICD-10-CM

## 2025-03-20 DIAGNOSIS — N95.8 GENITOURINARY SYNDROME OF MENOPAUSE: ICD-10-CM

## 2025-03-20 DIAGNOSIS — Z09 HOSPITAL DISCHARGE FOLLOW-UP: ICD-10-CM

## 2025-03-20 DIAGNOSIS — R40.4 TRANSIENT ALTERATION OF AWARENESS: Primary | ICD-10-CM

## 2025-03-20 DIAGNOSIS — R39.9 URINARY SYMPTOM OR SIGN: ICD-10-CM

## 2025-03-20 DIAGNOSIS — Z87.891 HISTORY OF TOBACCO USE, PRESENTING HAZARDS TO HEALTH: ICD-10-CM

## 2025-03-20 LAB
ALBUMIN UR-MCNC: 30 MG/DL
APPEARANCE UR: CLEAR
BACTERIA #/AREA URNS HPF: ABNORMAL /HPF
BILIRUB UR QL STRIP: NEGATIVE
COLOR UR AUTO: YELLOW
ERYTHROCYTE [DISTWIDTH] IN BLOOD BY AUTOMATED COUNT: 13.9 % (ref 10–15)
GLUCOSE UR STRIP-MCNC: NEGATIVE MG/DL
HCT VFR BLD AUTO: 32.7 % (ref 35–47)
HGB BLD-MCNC: 10.2 G/DL (ref 11.7–15.7)
HGB UR QL STRIP: ABNORMAL
KETONES UR STRIP-MCNC: NEGATIVE MG/DL
LEUKOCYTE ESTERASE UR QL STRIP: ABNORMAL
MCH RBC QN AUTO: 28.6 PG (ref 26.5–33)
MCHC RBC AUTO-ENTMCNC: 31.2 G/DL (ref 31.5–36.5)
MCV RBC AUTO: 92 FL (ref 78–100)
NITRATE UR QL: NEGATIVE
PH UR STRIP: 7 [PH] (ref 5–7)
PLATELET # BLD AUTO: 252 10E3/UL (ref 150–450)
RBC # BLD AUTO: 3.57 10E6/UL (ref 3.8–5.2)
RBC #/AREA URNS AUTO: ABNORMAL /HPF
SP GR UR STRIP: 1.02 (ref 1–1.03)
SQUAMOUS #/AREA URNS AUTO: ABNORMAL /LPF
UROBILINOGEN UR STRIP-ACNC: 0.2 E.U./DL
WBC # BLD AUTO: 6.1 10E3/UL (ref 4–11)
WBC #/AREA URNS AUTO: >100 /HPF
WBC CLUMPS #/AREA URNS HPF: PRESENT /HPF

## 2025-03-20 PROCEDURE — 71271 CT THORAX LUNG CANCER SCR C-: CPT | Mod: GC | Performed by: RADIOLOGY

## 2025-03-20 RX ORDER — ESTRADIOL 0.1 MG/G
2 CREAM VAGINAL
Qty: 42.5 G | Refills: 4 | Status: SHIPPED | OUTPATIENT
Start: 2025-03-20

## 2025-03-20 ASSESSMENT — PAIN SCALES - GENERAL: PAINLEVEL_OUTOF10: NO PAIN (0)

## 2025-03-20 NOTE — PATIENT INSTRUCTIONS
Flexeril and/or Xanax:   WARNING:  May cause drowsiness.  May impair ability to operate vehicles or machinery.  Do not use in combination with alcohol.   Take your magnesium glycinate at night everyday

## 2025-03-20 NOTE — PROGRESS NOTES
Assessment & Plan     Transient alteration of awareness  Reports transient changes to mental status - see HPI. Symptoms could be consistent with TIA vs MS exacerbation. Patient reports neurology did not think this was a side effect of her new infusion. They requested MRI for eval. Patient is at neurological baseline today with no acute deficits. Collaborated with ADS to schedule MRI tomorrow at [facility]. Will check labs today..   - CBC with platelets  - Comprehensive metabolic panel (BMP + Alb, Alk Phos, ALT, AST, Total. Bili, TP)  - Magnesium  - CBC with platelets  - Comprehensive metabolic panel (BMP + Alb, Alk Phos, ALT, AST, Total. Bili, TP)  - Magnesium    Urinary symptom or sign  Concern for recurrent UTI given neurological changes and patient reports frequent UTIs. No frequency, urgency, fever, or back pain. Will check UA/UC today.   - UA Macroscopic with reflex to Microscopic and Culture - Lab Collect  - UA Macroscopic with reflex to Microscopic and Culture - Lab Collect  - Urine Microscopic Exam  - Urine Culture    Genitourinary syndrome of menopause  Hx of frequent UTI. Recommended vaginal estrogen cream to reduce risk of UTI. Advised patient to use twice weekly.  - estradiol (ESTRACE) 0.1 MG/GM vaginal cream  Dispense: 42.5 g; Refill: 4    Hypomagnesemia  Low magnesium 3/14/2025  Magnesium   Date Value Ref Range Status   03/14/2025 1.4 (L) 1.7 - 2.3 mg/dL Final   07/01/2021 1.8 1.6 - 2.3 mg/dL Final     Will recheck today and advise on magnesium replacement  - Magnesium  - Magnesium      Hospital discharge follow-up  Was doing well up until this new episode as noted above        MED REC REQUIRED  Post Medication Reconciliation Status: discharge medications reconciled, continue medications without change        Subjective Marylee is a 69 year old, presenting for the following health issues:  See triage note    HPI          3/17/2025   Post Discharge Outreach   How are you doing now that you are home? I'm  "feeling better   How are your symptoms? (Red Flag symptoms escalate to triage hotline per guidelines) Improved   Were you started on any new medications or were there changes to any of your previous medications?  Yes   Does the patient have all of their medications? Yes   Do you have questions regarding any of your medications?  No   Discharge Follow Up Appointment Date 4/16/2025   Discharge Follow Up Appointment Scheduled with? Primary Care Provider     Marylee recently had a UTI then norovirus for which she was hospitalized. She was on amoxicillin for a UTI then norovirus hit. She went to the ED 3/9 and was discharged 3/14.   She got home Friday 3/14 and felt well over the weekend.    Monday night 3/17 her  reports she was very talkative and her eyes were wide and \"wild\". Monday night into Tuesday morning she woke and was speaking incoherently and yelling for her mother. Her head felt \"fuzzy\" around the top of her head which she has not experienced prior to this. Tuesday during the day she couldn't talk on the phone as she was incoherent and unable to speak full sentences.  Wednesday morning she woke up coherent and was at her baseline neurologically.   Wednesday night her head started feeling\"fuzzy\" again but she did not have any changes in her neuro status. She called her neurologist who recommended an MRI.     She did not have any new motor deficits during these episodes of symptoms. She has not taken any new sleep medication    Just had 2 new infusions for MS, narcisa- last infusion March 6.     Hospital Follow-up Visit:    Hospital/Nursing Home/IP Rehab Facility: [facility]  Most Recent Admission Date: 3/9/2025   Most Recent Admission Diagnosis: Acute pyelonephritis - N10  Acute colitis - K52.9     Most Recent Discharge Date: 3/14/2025   Most Recent Discharge Diagnosis: Acute pyelonephritis - N10  Acute colitis - K52.9  UTI (urinary tract infection) due to Enterococcus - " "N39.0, B95.2   Was the patient in the ICU or did the patient experience delirium during hospitalization?  No  Do you have any other stressors you would like to discuss with your provider? No    Problems taking medications regularly:  None  Medication changes since discharge: None  Problems adhering to non-medication therapy:  None    Summary of hospitalization:  [facility] discharge summary reviewed  Diagnostic Tests/Treatments reviewed.  Follow up needed: none  Other Healthcare Providers Involved in Patient s Care:         Specialist appointment - saw urology  Update since discharge: fluctuating course.         Plan of care communicated with patient and family             Review of Systems  Detailed as above      Objective    /68 (BP Location: Right arm, Patient Position: Sitting, Cuff Size: Adult Regular)   Pulse 87   Temp 97  F (36.1  C) (Tympanic)   Resp 10   Ht 1.727 m (5' 8\")   Wt 62.1 kg (137 lb)   LMP 01/31/2004 (Approximate)   SpO2 92%   BMI 20.83 kg/m    There is no height or weight on file to calculate BMI.  Physical Exam  Constitutional:       General: She is not in acute distress.     Appearance: Normal appearance.      Comments: Motorized wheelchair use   HENT:      Head: Normocephalic and atraumatic.   Neurological:      General: No focal deficit present.      Mental Status: She is alert and oriented to person, place, and time. Mental status is at baseline.      Comments: Weakness in left leg at baseline d/t MS   Psychiatric:         Mood and Affect: Mood normal.         Behavior: Behavior normal.         Thought Content: Thought content normal.         Judgment: Judgment normal.                  I saw this patient in collaboration with Marguerite Ruffin NP student      I was present with the APRN/PA student who participated in the service and in the documentation of the services provided. I have verified the history and personally performed the physical exam and medical " decision making, as documented by the student and edited by me.     SWAPNIL Sheehan, SHADY Ruffin NP Student      Signed Electronically by: SWAPNIL Hicks CNP

## 2025-03-20 NOTE — RESULT ENCOUNTER NOTE
Your urine is dirty but lets wait for the culture to return considering your recent antibiotic use. I don't want to cause more problems than needed. Especially too because of the norovirus and gut stuff as well!

## 2025-03-21 ENCOUNTER — HOSPITAL ENCOUNTER (OUTPATIENT)
Dept: MRI IMAGING | Facility: CLINIC | Age: 70
Discharge: HOME | End: 2025-03-21
Attending: FAMILY MEDICINE | Admitting: FAMILY MEDICINE
Payer: MEDICARE

## 2025-03-21 DIAGNOSIS — R41.82 ALTERED MENTAL STATUS, UNSPECIFIED ALTERED MENTAL STATUS TYPE: ICD-10-CM

## 2025-03-21 PROCEDURE — 70551 MRI BRAIN STEM W/O DYE: CPT

## 2025-03-24 ENCOUNTER — TELEPHONE (OUTPATIENT)
Dept: FAMILY MEDICINE | Facility: CLINIC | Age: 70
End: 2025-03-24
Payer: MEDICARE

## 2025-03-24 NOTE — TELEPHONE ENCOUNTER
Yes she can do the amoxicillin for 5 days. That'd be great. Let us know if any new symptoms develop.     SWAPNIL Sheehan CNP

## 2025-03-24 NOTE — TELEPHONE ENCOUNTER
Spouse spoke with patient regarding Urine Culture results. Patient stated she is having a burning sensation with urination for the past 1 day.     She has 10 tablets amoxicillin 500 mg leftover from a previous UTI 2 weeks ago.

## 2025-03-24 NOTE — TELEPHONE ENCOUNTER
Spoke with pt's  about Provider's note. Pt verbalized understanding and has no further questions or concerns.     Eugene Jackson RN  [facility]

## 2025-03-26 ENCOUNTER — LAB (OUTPATIENT)
Dept: LAB | Facility: CLINIC | Age: 70
End: 2025-03-26
Payer: MEDICARE

## 2025-03-26 ENCOUNTER — HOSPITAL ENCOUNTER (INPATIENT)
Facility: CLINIC | Age: 70
Discharge: HOME | DRG: 758 | End: 2025-03-26
Attending: EMERGENCY MEDICINE
Payer: MEDICARE

## 2025-03-26 DIAGNOSIS — R41.0 CONFUSION: ICD-10-CM

## 2025-03-26 DIAGNOSIS — G35 MS (MULTIPLE SCLEROSIS) (H): ICD-10-CM

## 2025-03-26 DIAGNOSIS — N30.00 ACUTE CYSTITIS WITHOUT HEMATURIA: ICD-10-CM

## 2025-03-26 DIAGNOSIS — N39.0 URINARY TRACT INFECTION WITHOUT HEMATURIA, SITE UNSPECIFIED: Primary | ICD-10-CM

## 2025-03-26 LAB
ALBUMIN SERPL BCG-MCNC: 3.9 G/DL (ref 3.5–5.2)
ALBUMIN UR-MCNC: NEGATIVE MG/DL
ALP SERPL-CCNC: 127 U/L (ref 40–150)
ALT SERPL W P-5'-P-CCNC: 10 U/L (ref 0–50)
ANION GAP SERPL CALCULATED.3IONS-SCNC: 10 MMOL/L (ref 7–15)
APPEARANCE UR: CLEAR
AST SERPL W P-5'-P-CCNC: 19 U/L (ref 0–45)
BACTERIA #/AREA URNS HPF: ABNORMAL /HPF
BASOPHILS # BLD AUTO: 0 10E3/UL (ref 0–0.2)
BASOPHILS NFR BLD AUTO: 1 %
BILIRUB SERPL-MCNC: 0.2 MG/DL
BILIRUB UR QL STRIP: NEGATIVE
BUN SERPL-MCNC: 17.4 MG/DL (ref 8–23)
CALCIUM SERPL-MCNC: 9.8 MG/DL (ref 8.8–10.4)
CHLORIDE SERPL-SCNC: 102 MMOL/L (ref 98–107)
COLOR UR AUTO: YELLOW
CREAT SERPL-MCNC: 0.9 MG/DL (ref 0.51–0.95)
CRP SERPL-MCNC: 11.02 MG/L
EGFRCR SERPLBLD CKD-EPI 2021: 69 ML/MIN/1.73M2
EOSINOPHIL # BLD AUTO: 0.2 10E3/UL (ref 0–0.7)
EOSINOPHIL NFR BLD AUTO: 3 %
ERYTHROCYTE [DISTWIDTH] IN BLOOD BY AUTOMATED COUNT: 13.8 % (ref 10–15)
FLUAV RNA SPEC QL NAA+PROBE: NEGATIVE
FLUBV RNA RESP QL NAA+PROBE: NEGATIVE
GLUCOSE SERPL-MCNC: 112 MG/DL (ref 70–99)
GLUCOSE UR STRIP-MCNC: NEGATIVE MG/DL
HCO3 SERPL-SCNC: 28 MMOL/L (ref 22–29)
HCT VFR BLD AUTO: 34.1 % (ref 35–47)
HGB BLD-MCNC: 11 G/DL (ref 11.7–15.7)
HGB UR QL STRIP: ABNORMAL
IMM GRANULOCYTES # BLD: 0 10E3/UL
IMM GRANULOCYTES NFR BLD: 0 %
KETONES UR STRIP-MCNC: NEGATIVE MG/DL
LACTATE SERPL-SCNC: 1.1 MMOL/L (ref 0.7–2)
LEUKOCYTE ESTERASE UR QL STRIP: ABNORMAL
LYMPHOCYTES # BLD AUTO: 0.8 10E3/UL (ref 0.8–5.3)
LYMPHOCYTES NFR BLD AUTO: 15 %
MAGNESIUM SERPL-MCNC: 2 MG/DL (ref 1.7–2.3)
MCH RBC QN AUTO: 29 PG (ref 26.5–33)
MCHC RBC AUTO-ENTMCNC: 32.3 G/DL (ref 31.5–36.5)
MCV RBC AUTO: 90 FL (ref 78–100)
MONOCYTES # BLD AUTO: 0.5 10E3/UL (ref 0–1.3)
MONOCYTES NFR BLD AUTO: 9 %
NEUTROPHILS # BLD AUTO: 3.8 10E3/UL (ref 1.6–8.3)
NEUTROPHILS NFR BLD AUTO: 71 %
NITRATE UR QL: NEGATIVE
NRBC # BLD AUTO: 0 10E3/UL
NRBC BLD AUTO-RTO: 0 /100
PH UR STRIP: 7 [PH] (ref 5–7)
PLAT MORPH BLD: NORMAL
PLATELET # BLD AUTO: 302 10E3/UL (ref 150–450)
POTASSIUM SERPL-SCNC: 4.9 MMOL/L (ref 3.4–5.3)
PROT SERPL-MCNC: 7.9 G/DL (ref 6.4–8.3)
RBC # BLD AUTO: 3.79 10E6/UL (ref 3.8–5.2)
RBC #/AREA URNS AUTO: ABNORMAL /HPF
RBC MORPH BLD: NORMAL
RSV RNA SPEC NAA+PROBE: NEGATIVE
SARS-COV-2 RNA RESP QL NAA+PROBE: NEGATIVE
SODIUM SERPL-SCNC: 140 MMOL/L (ref 135–145)
SP GR UR STRIP: 1.01 (ref 1–1.03)
TSH SERPL DL<=0.005 MIU/L-ACNC: 1.31 UIU/ML (ref 0.3–4.2)
UROBILINOGEN UR STRIP-ACNC: 0.2 E.U./DL
WBC # BLD AUTO: 5.3 10E3/UL (ref 4–11)
WBC #/AREA URNS AUTO: ABNORMAL /HPF
YEAST #/AREA URNS HPF: ABNORMAL /HPF

## 2025-03-26 PROCEDURE — 96365 THER/PROPH/DIAG IV INF INIT: CPT

## 2025-03-26 PROCEDURE — 80053 COMPREHEN METABOLIC PANEL: CPT | Performed by: EMERGENCY MEDICINE

## 2025-03-26 PROCEDURE — 85004 AUTOMATED DIFF WBC COUNT: CPT | Performed by: EMERGENCY MEDICINE

## 2025-03-26 PROCEDURE — 81001 URINALYSIS AUTO W/SCOPE: CPT

## 2025-03-26 PROCEDURE — 87637 SARSCOV2&INF A&B&RSV AMP PRB: CPT | Performed by: EMERGENCY MEDICINE

## 2025-03-26 PROCEDURE — G0378 HOSPITAL OBSERVATION PER HR: HCPCS

## 2025-03-26 PROCEDURE — 250N000011 HC RX IP 250 OP 636: Performed by: EMERGENCY MEDICINE

## 2025-03-26 PROCEDURE — 86140 C-REACTIVE PROTEIN: CPT | Performed by: HOSPITALIST

## 2025-03-26 PROCEDURE — 87086 URINE CULTURE/COLONY COUNT: CPT

## 2025-03-26 PROCEDURE — 85018 HEMOGLOBIN: CPT | Performed by: EMERGENCY MEDICINE

## 2025-03-26 PROCEDURE — 36415 COLL VENOUS BLD VENIPUNCTURE: CPT | Performed by: EMERGENCY MEDICINE

## 2025-03-26 PROCEDURE — 99291 CRITICAL CARE FIRST HOUR: CPT | Mod: 25

## 2025-03-26 PROCEDURE — 83605 ASSAY OF LACTIC ACID: CPT | Performed by: EMERGENCY MEDICINE

## 2025-03-26 PROCEDURE — 99223 1ST HOSP IP/OBS HIGH 75: CPT | Performed by: HOSPITALIST

## 2025-03-26 PROCEDURE — 84443 ASSAY THYROID STIM HORMONE: CPT | Performed by: EMERGENCY MEDICINE

## 2025-03-26 PROCEDURE — 83735 ASSAY OF MAGNESIUM: CPT | Performed by: EMERGENCY MEDICINE

## 2025-03-26 PROCEDURE — 87040 BLOOD CULTURE FOR BACTERIA: CPT | Performed by: EMERGENCY MEDICINE

## 2025-03-26 RX ORDER — CEFTRIAXONE 1 G/1
1 INJECTION, POWDER, FOR SOLUTION INTRAMUSCULAR; INTRAVENOUS ONCE
Status: COMPLETED | OUTPATIENT
Start: 2025-03-26 | End: 2025-03-26

## 2025-03-26 RX ORDER — BACLOFEN 10 MG/1
20 TABLET ORAL DAILY
Status: DISCONTINUED | OUTPATIENT
Start: 2025-03-27 | End: 2025-03-29 | Stop reason: HOSPADM

## 2025-03-26 RX ORDER — AMLODIPINE BESYLATE 5 MG/1
5 TABLET ORAL DAILY
Status: DISCONTINUED | OUTPATIENT
Start: 2025-03-27 | End: 2025-03-29 | Stop reason: HOSPADM

## 2025-03-26 RX ORDER — FAMOTIDINE 20 MG/1
40 TABLET, FILM COATED ORAL AT BEDTIME
Status: DISCONTINUED | OUTPATIENT
Start: 2025-03-26 | End: 2025-03-29 | Stop reason: HOSPADM

## 2025-03-26 RX ORDER — ARMODAFINIL 50 MG/1
200 TABLET ORAL EVERY MORNING
Status: DISCONTINUED | OUTPATIENT
Start: 2025-03-27 | End: 2025-03-29 | Stop reason: HOSPADM

## 2025-03-26 RX ORDER — LATANOPROST 50 UG/ML
1 SOLUTION/ DROPS OPHTHALMIC DAILY
Status: DISCONTINUED | OUTPATIENT
Start: 2025-03-27 | End: 2025-03-29 | Stop reason: HOSPADM

## 2025-03-26 RX ORDER — LOSARTAN POTASSIUM 50 MG/1
50 TABLET ORAL 2 TIMES DAILY
Status: DISCONTINUED | OUTPATIENT
Start: 2025-03-27 | End: 2025-03-29 | Stop reason: HOSPADM

## 2025-03-26 RX ORDER — ACETAMINOPHEN 500 MG
500-1000 TABLET ORAL EVERY 8 HOURS PRN
Status: DISCONTINUED | OUTPATIENT
Start: 2025-03-26 | End: 2025-03-29 | Stop reason: HOSPADM

## 2025-03-26 RX ORDER — GABAPENTIN 300 MG/1
900 CAPSULE ORAL AT BEDTIME
COMMUNITY

## 2025-03-26 RX ORDER — ACYCLOVIR 400 MG/1
400 TABLET ORAL EVERY 12 HOURS
Status: DISCONTINUED | OUTPATIENT
Start: 2025-03-27 | End: 2025-03-29 | Stop reason: HOSPADM

## 2025-03-26 RX ORDER — GABAPENTIN 300 MG/1
900 CAPSULE ORAL 2 TIMES DAILY
Status: DISCONTINUED | OUTPATIENT
Start: 2025-03-26 | End: 2025-03-28

## 2025-03-26 RX ORDER — CEFTRIAXONE 2 G/1
2 INJECTION, POWDER, FOR SOLUTION INTRAMUSCULAR; INTRAVENOUS EVERY 24 HOURS
Status: DISCONTINUED | OUTPATIENT
Start: 2025-03-27 | End: 2025-03-27

## 2025-03-26 RX ORDER — MAGNESIUM OXIDE 400 MG/1
400 TABLET ORAL DAILY
COMMUNITY

## 2025-03-26 RX ORDER — LEVOTHYROXINE SODIUM 112 UG/1
112 TABLET ORAL DAILY
Status: DISCONTINUED | OUTPATIENT
Start: 2025-03-27 | End: 2025-03-29 | Stop reason: HOSPADM

## 2025-03-26 RX ADMIN — CEFTRIAXONE SODIUM 1 G: 1 INJECTION, POWDER, FOR SOLUTION INTRAMUSCULAR; INTRAVENOUS at 19:55

## 2025-03-26 ASSESSMENT — ACTIVITIES OF DAILY LIVING (ADL)
ADLS_ACUITY_SCORE: 65

## 2025-03-26 NOTE — ED TRIAGE NOTES
Pt recently discharged Friday been having increased confusion that is intermittent since then but today has increased. Pt was also at PT at today staff noticed pt had slow and confused speech along with falling asleep while driving her wheel chair. They called neurologist and advised to present to ER

## 2025-03-26 NOTE — ED PROVIDER NOTES
"  Emergency Department Note      History of Present Illness     Chief Complaint   Altered Mental Status    HPI   Marylee Woods is a 69 year old female with a history of MS, graves disease, hyperlipidemia  presenting with intermittent confusion. The the last three weeks Marylee's  reports that she  has been intermittently confused and reports that the top of her head feels \"fuzzy\". She was diagnosed with a UTI on 3/09/25 and stayed in the hospital for combined UTI and norovirus. She was put on amoxicillin and given IV fluids. After the discharge she was still experiencing intermittent confusion and head \"fuzziness\" along with urinary frequency and dysuria on Monday. The patient denies fever, cough, rhinorrhea, congestion, nausea, vomiting, chest pain, or shortness of breath.  She recently restarted the amoxicillin that she was previously prescribed.  Urine analysis from this morning demonstrates potential recurrent UTI.    Independent Historian    as detailed above.    Review of External Notes   3/14/25 - discharge summary   3/26/25 - urine analysis     Past Medical History     Medical History and Problem List   Past Medical History:   Diagnosis Date    Acute cystitis without hematuria     Acute pain of right knee     Atypical ductal hyperplasia of left breast 02/2022    Candida esophagitis - 2019 (H)     CKD (chronic kidney disease) stage 3, GFR 30-59 ml/min (H)     Community acquired pneumonia, unspecified laterality     Cough, unspecified type 04/05/2024    Depression     Epigastric pain     Former tobacco use     Fracture of femur, distal, left, closed (H)     Gastro-oesophageal reflux disease     Graves disease     History of fracture of fibula     History of tibial fracture     HSV (herpes simplex virus) infection     Hyperlipidemia with target LDL less than 130     Hyponatremia     L tib fx s/p IM nailing     Lung nodules     Multiple sclerosis (H)     Osteoporosis     Postablative hypothyroidism "        Medications   acetaminophen (TYLENOL) 500 MG tablet  acyclovir (ZOVIRAX) 400 MG tablet  amLODIPine (NORVASC) 5 MG tablet  Armodafinil 200 MG TABS  baclofen (LIORESAL) 10 MG tablet  Calcium Carbonate Antacid 600 MG CHEW  CRANBERRY PO  DULoxetine (CYMBALTA) 20 MG capsule  DULoxetine (CYMBALTA) 60 MG capsule  estradiol (ESTRACE) 0.1 MG/GM vaginal cream  famotidine (PEPCID) 40 MG tablet  gabapentin (NEURONTIN) 300 MG capsule  latanoprost (XALATAN) 0.005 % ophthalmic solution  levothyroxine (SYNTHROID/LEVOTHROID) 112 MCG tablet  losartan (COZAAR) 50 MG tablet  Multiple Vitamins-Minerals (MULTIPLE VITAMINS/WOMENS PO)  nitroFURantoin macrocrystal-monohydrate (MACROBID) 100 MG capsule  ondansetron (ZOFRAN ODT) 4 MG ODT tab        Surgical History   Past Surgical History:   Procedure Laterality Date    C/SECTION, LOW TRANSVERSE  1992    COLONOSCOPY  2007    COLONOSCOPY N/A 1/26/2017    Procedure: COMBINED COLONOSCOPY, SINGLE OR MULTIPLE BIOPSY/POLYPECTOMY BY BIOPSY;  Surgeon: Mayo Adkins MD, MD;  Location:  GI    ESOPHAGOSCOPY, GASTROSCOPY, DUODENOSCOPY (EGD), COMBINED  1/26/2017    Dr. Adkins [facility]    ESOPHAGOSCOPY, GASTROSCOPY, DUODENOSCOPY (EGD), COMBINED N/A 1/26/2017    Procedure: COMBINED ESOPHAGOSCOPY, GASTROSCOPY, DUODENOSCOPY (EGD), BIOPSY SINGLE OR MULTIPLE;  Surgeon: Mayo Adkins MD, MD;  Location:  GI    ESOPHAGOSCOPY, GASTROSCOPY, DUODENOSCOPY (EGD), COMBINED N/A 4/20/2023    Procedure: ESOPHAGOGASTRODUODENOSCOPY, WITH BIOPSY;  Surgeon: Cristina Zuniga MD;  Location:  GI    HIP SURGERY  2009    femur ortho surgery    LAPAROSCOPIC CHOLECYSTECTOMY  6/24/2014    Procedure: LAPAROSCOPIC CHOLECYSTECTOMY;  Surgeon: Randy Bailey MD;  Location:  [facility]    LUMPECTOMY BREAST Left 3/31/2022    Procedure: LEFT Radiofrequency Identification Seed-Localized Lumpectomy;  Surgeon: Amanda Liu MD;  Location: [facility] OR    OPEN REDUCTION INTERNAL FIXATION FEMUR DISTAL Left 11/1/2018     Procedure: OPEN REDUCTION INTERNAL FIXATION LEFT FEMUR DISTAL;  Surgeon: Jose Valadez MD;  Location: [facility]    OPEN REDUCTION INTERNAL FIXATION RODDING INTRAMEDULLARY TIBIA  4/14/2013    Procedure: OPEN REDUCTION INTERNAL FIXATION RODDING INTRAMEDULLARY TIBIA;;  Surgeon: Sajan Cast MD;  Location: [facility]    ORTHOPEDIC SURGERY  2009    surgery right upper femur fx near hip       Physical Exam     Patient Vitals for the past 24 hrs:   BP Temp Pulse Resp SpO2   03/26/25 1859 -- -- -- -- 94 %   03/26/25 1831 -- -- -- -- 99 %   03/26/25 1827 125/72 97.3  F (36.3  C) 90 16 --     Physical Exam  Constitutional:       Appearance: Normal appearance.   HENT:      Head: Normocephalic and atraumatic.   Eyes:      Extraocular Movements: Extraocular movements intact.      Conjunctiva/sclera: Conjunctivae normal.   Cardiovascular:      Rate and Rhythm: Normal rate and regular rhythm.   Pulmonary:      Effort: Pulmonary effort is normal. No respiratory distress.      Breath sounds: Clear to auscultation bilaterally.  No wheezing.  No stridor.  Abdominal:      General: Abdomen is flat. There is no distension.      Palpations: Abdomen is soft.      Tenderness: There is no abdominal tenderness.   Musculoskeletal:      Cervical back: Normal range of motion. No rigidity.       Right lower leg: No edema.      Left lower leg: No edema.   Skin:     General: Skin is warm and dry.   Neurological:      General: No focal deficit present.      Mental Status: Alert and oriented to person, place, and time.   Psychiatric:         Mood and Affect: Mood normal.         Behavior: Behavior normal.    Diagnostics     Lab Results   Labs Ordered and Resulted from Time of ED Arrival to Time of ED Departure   COMPREHENSIVE METABOLIC PANEL - Abnormal       Result Value    Sodium 140      Potassium 4.9      Carbon Dioxide (CO2) 28      Anion Gap 10      Urea Nitrogen 17.4      Creatinine 0.90      GFR Estimate 69      Calcium 9.8       Chloride 102      Glucose 112 (*)     Alkaline Phosphatase 127      AST 19      ALT 10      Protein Total 7.9      Albumin 3.9      Bilirubin Total 0.2     CBC WITH PLATELETS AND DIFFERENTIAL - Abnormal    WBC Count 5.3      RBC Count 3.79 (*)     Hemoglobin 11.0 (*)     Hematocrit 34.1 (*)     MCV 90      MCH 29.0      MCHC 32.3      RDW 13.8      Platelet Count       MAGNESIUM - Normal    Magnesium 2.0     TSH WITH FREE T4 REFLEX - Normal    TSH 1.31     INFLUENZA A/B, RSV AND SARS-COV2 PCR - Normal    Influenza A PCR Negative      Influenza B PCR Negative      RSV PCR Negative      SARS CoV2 PCR Negative     LACTIC ACID WHOLE BLOOD WITH 1X REPEAT IN 2 HR WHEN >2 - Normal    Lactic Acid, Initial 1.1     RBC AND PLATELET MORPHOLOGY   CRP INFLAMMATION   BLOOD CULTURE   BLOOD CULTURE       Imaging   No orders to display       EKG   None    Independent Interpretation   None    ED Course      Medications Administered   Medications   cefTRIAXone (ROCEPHIN) 1 g vial to attach to  mL bag for ADULTS or NS 50 mL bag for PEDS (1 g Intravenous $New Bag 3/1955)       Procedures   Procedures     Discussion of Management   See ED Course    ED Course   ED Course as of 03/26/25 2017   Wed Mar 26, 2025   1843 I obtained the history and examined the patient as noted above.      1855 I rechecked and updated the patient.    2008 I spoke with Dr. Rojas, hospitalist, regarding the patient's history and presentation in the ED today. Dr. Rojas accepts the patient for admission.          Additional Documentation  None    Medical Decision Making / Diagnosis     CMS Diagnoses: None    MIPS       None    [facility]   Marylee Woods is a 69 year old female as described above presents to the emergency department for altered mental status.  Patient hemodynamically stable at time of evaluation.  Afebrile.  At time of evaluation, patient is alert and oriented, but per , patient has been having intermittent confusion and there is now  concerns about ability to care for patient at home due to her frequency of confusion episodes.  Recently was hospitalized for acute cystitis and norovirus.  Patient had increased urinary frequency/volume today and resumed her amoxicillin, but outpatient urinalysis today demonstrates potential recurrence versus incompletely treated UTI.  Will administer IV Rocephin.  Infectious workup.  Suspect encephalopathy likely secondary to underlying urinary tract infection.  3/21/2025 MRI brain reviewed does not demonstrate any acute findings.  There is findings of nonspecific white matter lesions which is consistent with patient's MS disease.  Certainly possibility dementia related to MS.  At this time, there is no indication for repeat head imaging.  Will evaluate for electrolyte derangements for potential metabolic causes of encephalopathy.   requests observation admission for potential further PT/OT/cognitive evaluation due to patient's worsening confusions at home.  Discussed care plan with patient and  who voiced understanding and agreement with plan.  Answered all questions.  Additional workup and orders as listed in chart.    Ultimately, work up shows no evidence of significant leukocytosis to suggest severe systemic infection.  No evidence of significant metabolic derangements.  Lactic acid 1.1.  Viral swab negative.  Patient was admitted to the hospital service for further evaluation and treatment.    Please refer to ED course above as part of continuation of MDM for details on the patient's treatment course and any potential changes or updates beyond my initial evaluation and MDM creation.    Disposition   The patient was admitted to the hospital.     Diagnosis     ICD-10-CM    1. Urinary tract infection without hematuria, site unspecified  N39.0       2. Confusion  R41.0            Discharge Medications   New Prescriptions    No medications on file         Scribe Disclosure:  Juana COLON am  serving as a scribe at 6:59 PM on 3/26/2025 to document services personally performed by Tod Hutchison DO based on my observations and the provider's statements to me.        Tod Hutchison DO  03/26/25 2019

## 2025-03-27 ENCOUNTER — APPOINTMENT (OUTPATIENT)
Dept: PHYSICAL THERAPY | Facility: CLINIC | Age: 70
DRG: 758 | End: 2025-03-27
Attending: HOSPITALIST
Payer: MEDICARE

## 2025-03-27 LAB
BACTERIA BLD CULT: NORMAL
BACTERIA BLD CULT: NORMAL
BACTERIA UR CULT: NORMAL

## 2025-03-27 PROCEDURE — 97530 THERAPEUTIC ACTIVITIES: CPT | Mod: GP | Performed by: PHYSICAL THERAPIST

## 2025-03-27 PROCEDURE — G0378 HOSPITAL OBSERVATION PER HR: HCPCS

## 2025-03-27 PROCEDURE — 99223 1ST HOSP IP/OBS HIGH 75: CPT | Performed by: PSYCHIATRY & NEUROLOGY

## 2025-03-27 PROCEDURE — 120N000001 HC R&B MED SURG/OB

## 2025-03-27 PROCEDURE — 250N000013 HC RX MED GY IP 250 OP 250 PS 637: Performed by: EMERGENCY MEDICINE

## 2025-03-27 PROCEDURE — 99418 PROLNG IP/OBS E/M EA 15 MIN: CPT | Performed by: PSYCHIATRY & NEUROLOGY

## 2025-03-27 PROCEDURE — 250N000013 HC RX MED GY IP 250 OP 250 PS 637: Performed by: PHYSICIAN ASSISTANT

## 2025-03-27 PROCEDURE — 97161 PT EVAL LOW COMPLEX 20 MIN: CPT | Mod: GP | Performed by: PHYSICAL THERAPIST

## 2025-03-27 PROCEDURE — 99232 SBSQ HOSP IP/OBS MODERATE 35: CPT | Performed by: PHYSICIAN ASSISTANT

## 2025-03-27 PROCEDURE — 250N000013 HC RX MED GY IP 250 OP 250 PS 637: Performed by: HOSPITALIST

## 2025-03-27 RX ORDER — POLYETHYLENE GLYCOL 3350 17 G/17G
17 POWDER, FOR SOLUTION ORAL DAILY
Status: DISCONTINUED | OUTPATIENT
Start: 2025-03-27 | End: 2025-03-29 | Stop reason: HOSPADM

## 2025-03-27 RX ORDER — AMOXICILLIN 250 MG
1 CAPSULE ORAL 2 TIMES DAILY
Status: DISCONTINUED | OUTPATIENT
Start: 2025-03-27 | End: 2025-03-29 | Stop reason: HOSPADM

## 2025-03-27 RX ORDER — PROCHLORPERAZINE MALEATE 5 MG/1
5 TABLET ORAL EVERY 6 HOURS PRN
Status: DISCONTINUED | OUTPATIENT
Start: 2025-03-27 | End: 2025-03-29 | Stop reason: HOSPADM

## 2025-03-27 RX ORDER — AMOXICILLIN 250 MG
2 CAPSULE ORAL 2 TIMES DAILY PRN
Status: DISCONTINUED | OUTPATIENT
Start: 2025-03-27 | End: 2025-03-29 | Stop reason: HOSPADM

## 2025-03-27 RX ORDER — ONDANSETRON 4 MG/1
4 TABLET, ORALLY DISINTEGRATING ORAL EVERY 6 HOURS PRN
Status: DISCONTINUED | OUTPATIENT
Start: 2025-03-27 | End: 2025-03-29 | Stop reason: HOSPADM

## 2025-03-27 RX ORDER — ONDANSETRON 2 MG/ML
4 INJECTION INTRAMUSCULAR; INTRAVENOUS EVERY 6 HOURS PRN
Status: DISCONTINUED | OUTPATIENT
Start: 2025-03-27 | End: 2025-03-29 | Stop reason: HOSPADM

## 2025-03-27 RX ORDER — AMOXICILLIN 250 MG
2 CAPSULE ORAL 2 TIMES DAILY
Status: DISCONTINUED | OUTPATIENT
Start: 2025-03-27 | End: 2025-03-29 | Stop reason: HOSPADM

## 2025-03-27 RX ORDER — BACLOFEN 10 MG/1
30 TABLET ORAL EVERY EVENING
Status: DISCONTINUED | OUTPATIENT
Start: 2025-03-27 | End: 2025-03-29 | Stop reason: HOSPADM

## 2025-03-27 RX ORDER — GABAPENTIN 300 MG/1
900 CAPSULE ORAL ONCE
Status: COMPLETED | OUTPATIENT
Start: 2025-03-27 | End: 2025-03-27

## 2025-03-27 RX ORDER — BACLOFEN 10 MG/1
30 TABLET ORAL ONCE
Status: COMPLETED | OUTPATIENT
Start: 2025-03-27 | End: 2025-03-27

## 2025-03-27 RX ORDER — AMOXICILLIN 250 MG
1 CAPSULE ORAL 2 TIMES DAILY PRN
Status: DISCONTINUED | OUTPATIENT
Start: 2025-03-27 | End: 2025-03-29 | Stop reason: HOSPADM

## 2025-03-27 RX ORDER — AMOXICILLIN 500 MG/1
500 CAPSULE ORAL EVERY 8 HOURS SCHEDULED
Status: DISCONTINUED | OUTPATIENT
Start: 2025-03-27 | End: 2025-03-29 | Stop reason: HOSPADM

## 2025-03-27 RX ADMIN — FAMOTIDINE 40 MG: 20 TABLET, FILM COATED ORAL at 01:29

## 2025-03-27 RX ADMIN — AMLODIPINE BESYLATE 5 MG: 5 TABLET ORAL at 08:40

## 2025-03-27 RX ADMIN — AMOXICILLIN 500 MG: 500 CAPSULE ORAL at 21:19

## 2025-03-27 RX ADMIN — GABAPENTIN 900 MG: 300 CAPSULE ORAL at 01:30

## 2025-03-27 RX ADMIN — GABAPENTIN 900 MG: 300 CAPSULE ORAL at 08:40

## 2025-03-27 RX ADMIN — FAMOTIDINE 40 MG: 20 TABLET, FILM COATED ORAL at 21:19

## 2025-03-27 RX ADMIN — BACLOFEN 30 MG: 10 TABLET ORAL at 01:30

## 2025-03-27 RX ADMIN — GABAPENTIN 900 MG: 300 CAPSULE ORAL at 21:18

## 2025-03-27 RX ADMIN — ARMODAFINIL 200 MG: 50 TABLET ORAL at 13:05

## 2025-03-27 RX ADMIN — ACYCLOVIR 400 MG: 400 TABLET ORAL at 12:41

## 2025-03-27 RX ADMIN — LOSARTAN POTASSIUM 50 MG: 50 TABLET, FILM COATED ORAL at 21:18

## 2025-03-27 RX ADMIN — AMOXICILLIN 500 MG: 500 CAPSULE ORAL at 14:43

## 2025-03-27 RX ADMIN — BACLOFEN 20 MG: 10 TABLET ORAL at 08:40

## 2025-03-27 RX ADMIN — BACLOFEN 30 MG: 10 TABLET ORAL at 21:18

## 2025-03-27 RX ADMIN — LOSARTAN POTASSIUM 50 MG: 50 TABLET, FILM COATED ORAL at 08:40

## 2025-03-27 RX ADMIN — DULOXETINE HYDROCHLORIDE 80 MG: 60 CAPSULE, DELAYED RELEASE ORAL at 08:40

## 2025-03-27 RX ADMIN — LEVOTHYROXINE SODIUM 112 MCG: 112 TABLET ORAL at 08:40

## 2025-03-27 RX ADMIN — LOSARTAN POTASSIUM 50 MG: 50 TABLET, FILM COATED ORAL at 01:31

## 2025-03-27 RX ADMIN — POLYETHYLENE GLYCOL 3350 17 G: 17 POWDER, FOR SOLUTION ORAL at 08:41

## 2025-03-27 RX ADMIN — ACETAMINOPHEN 1000 MG: 500 TABLET, FILM COATED ORAL at 19:15

## 2025-03-27 RX ADMIN — ACYCLOVIR 400 MG: 400 TABLET ORAL at 01:30

## 2025-03-27 ASSESSMENT — ACTIVITIES OF DAILY LIVING (ADL)
ADLS_ACUITY_SCORE: 88
ADLS_ACUITY_SCORE: 76
ADLS_ACUITY_SCORE: 88
ADLS_ACUITY_SCORE: 76
DEPENDENT_IADLS:: CLEANING;COOKING;LAUNDRY;SHOPPING;TRANSPORTATION
ADLS_ACUITY_SCORE: 76
ADLS_ACUITY_SCORE: 88
ADLS_ACUITY_SCORE: 65
ADLS_ACUITY_SCORE: 88
ADLS_ACUITY_SCORE: 76
ADLS_ACUITY_SCORE: 76
ADLS_ACUITY_SCORE: 65
ADLS_ACUITY_SCORE: 88
ADLS_ACUITY_SCORE: 65
ADLS_ACUITY_SCORE: 76
ADLS_ACUITY_SCORE: 88
ADLS_ACUITY_SCORE: 88
ADLS_ACUITY_SCORE: 76

## 2025-03-27 NOTE — PROGRESS NOTES
Care Management Follow Up    Length of Stay (days): 0    Expected Discharge Date: 03/29/2025     Concerns to be Addressed: discharge planning     Patient plan of care discussed at interdisciplinary rounds: Yes    Anticipated Discharge Disposition: TCU              Anticipated Discharge Services: None  Anticipated Discharge DME: None    Patient/family educated on Medicare website which has current facility and service quality ratings:    Education Provided on the Discharge Plan: Yes  Patient/Family in Agreement with the Plan: yes    Referrals Placed by CM/SW: Internal Clinic Care Coordination  Private pay costs discussed: private room/amenity fees    Discussed  Partnership in Safe Discharge Planning  document with patient/family: No     Handoff Completed: Yes, [facility] PCP: Internal handoff referral completed    Additional Information:    SW met with patient at bedside to get TCU choices. Patient prefers [facility]. Patient is also agreeable to [facility] and [facility]. SW sent referrals.    Next Steps: secure TCU bed    MARLYS Vides

## 2025-03-27 NOTE — ED NOTES
[facility]  ED Nurse Handoff Report    ED Chief complaint: Altered Mental Status      ED Diagnosis:   Final diagnoses:   Urinary tract infection without hematuria, site unspecified   Confusion       Code Status: Full Code    Allergies:   Allergies   Allergen Reactions    Bactrim [Sulfamethoxazole-Trimethoprim] Hallucination    Hydrochlorothiazide      Hyponatremia    Sulfamethizole     Amantadine      hallucinations    Budesonide     Budesonide-Formoterol Fumarate Rash       Patient Story: pt presents for increased confusion.  reports for the last 3 weeks she has had confusion and feeling of fuzzyness on her head. Pt Dx with UTI on 3/09 and stayed in hospital for uti and norovirus. Pt was discharged and after feels dysuria.   Focused Assessment:  pt is wheel chair bound with motorized scooter due to MS.  describes confusion as pt falls asleep out of no where and states speech has been slow and confused. Work up for suspected encephalopathy vs possible dementia. Plan for IV abx and observation admission     Medications   cefTRIAXone (ROCEPHIN) 1 g vial to attach to  mL bag for ADULTS or NS 50 mL bag for PEDS (1 g Intravenous $New Bag 3/1955)        Treatments and/or interventions provided: blood work IV abx   Patient's response to treatments and/or interventions: denies pain     To be done/followed up on inpatient unit:  ID follow up care     Does this patient have any cognitive concerns?: Forgetful    Activity level - Baseline/Home:  Wheelchair  Activity Level - Current:   Wheelchair    Patient's Preferred language: English   Needed?: No    Isolation: None  Infection: Not Applicable  Patient tested for COVID 19 prior to admission: NO  Bariatric?: No    Vital Signs:   Vitals:    03/26/25 1827 03/26/25 1831 03/26/25 1859   BP: 125/72     Pulse: 90     Resp: 16     Temp: 97.3  F (36.3  C)     SpO2:  99% 94%       Cardiac Rhythm:     Was the PSS-3 completed:    Yes  What interventions are required if any?               Family Comments: concern for continued infection   OBS brochure/video discussed/provided to patient/family: No              Name of person given brochure if not patient:               Relationship to patient:      For the majority of the shift this patient's behavior was Green.   Behavioral interventions performed were none .    ED NURSE PHONE NUMBER: ED RN

## 2025-03-27 NOTE — CONSULTS
No further care management intervention anticipated at this time.  Please re-consult if further needs arise.  Care management signing off.           Judith Rodney RN, BSN, ACM   Care Transitions Specialist  [facility]  Care Transitions Specialist  [address] [place]. [address]  [email or web address]  Office: [phone] Fax: [phone]  [facility]

## 2025-03-27 NOTE — PROGRESS NOTES
Care Management Follow Up    Length of Stay (days): 0    Expected Discharge Date: 03/28/2025     Concerns to be Addressed: discharge planning     Patient plan of care discussed at interdisciplinary rounds: Yes    Anticipated Discharge Disposition: Home, Outpatient Rehab (PT, OT, SLP, Cardiac or Pulmonary)     Anticipated Discharge Services: None  Anticipated Discharge DME: None    Patient/family educated on Medicare website which has current facility and service quality ratings:    Education Provided on the Discharge Plan: Yes  Patient/Family in Agreement with the Plan: yes    Referrals Placed by CM/SW: Internal Clinic Care Coordination  Private pay costs discussed: Not applicable    Discussed  Partnership in Safe Discharge Planning  document with patient/family: No     Handoff Completed: No, handoff not indicated or clinically appropriate    Additional Information:  Writer talked with patient regarding TCU. Provided TCU list with [facility]. Explained coverage for TCU. Patient is going to look over the list and SW will come back and get choices today.    Next Steps: Care management will continue to follow and get TCU choices     STEPHANY Hansen

## 2025-03-27 NOTE — PHARMACY-ADMISSION MEDICATION HISTORY
Pharmacist Admission Medication History    Admission medication history is complete. The information provided in this note is only as accurate as the sources available at the time of the update.    Information Source(s): Patient, Family member, Hospital records, and CareEverywhere/SureScripts via in-person    Pertinent Information:    - patient recently started Estrace on 3/23    Changes made to PTA medication list:  Added: Magnesium  Deleted: None  Changed: Gabapentin from 900mg BID to 600mg AM and 900mg PM      Medication History Completed By: Edgard Becker RPH 3/26/2025 9:07 PM    PTA Med List   Medication Sig Note Last Dose/Taking    acetaminophen (TYLENOL) 500 MG tablet Take 1-2 tablets (500-1,000 mg) by mouth every 8 hours as needed for mild pain or fever (greater than 101 degrees)  Unknown    acyclovir (ZOVIRAX) 400 MG tablet TAKE 1 TABLET BY MOUTH EVERY 12 HOURS  3/26/2025 Morning    amLODIPine (NORVASC) 5 MG tablet Take 1 tablet by mouth once daily  3/26/2025 Morning    Armodafinil 200 MG TABS Take 200 mg by mouth every morning  3/26/2025 Morning    baclofen (LIORESAL) 10 MG tablet Take 2 tablets (20 mg) in the morning and 3 tablets (30 mg) in the evening.  3/26/2025 Morning    Calcium Carbonate Antacid 600 MG CHEW Take 600 mg by mouth daily  3/25/2025 Evening    CRANBERRY PO Take by mouth. Take 1 capsule once daily.  3/26/2025 Morning    DULoxetine (CYMBALTA) 20 MG capsule TAKE 1 CAPSULE BY MOUTH ONCE DAILY ALONG  WITH  A  60  MG  TABLET  FOR  A  TOTAL  DOSE  OF  80  MG  DAILY  3/26/2025 Morning    DULoxetine (CYMBALTA) 60 MG capsule Take 1 capsule by mouth once daily (Patient taking differently: Take 1 capsule by mouth once daily, along with 20 mg for total of 80 mg daily dose.)  3/26/2025 Morning    estradiol (ESTRACE) 0.1 MG/GM vaginal cream Place 2 g vaginally twice a week. 3/26/2025: First dose was 3/23/25 3/23/2025    famotidine (PEPCID) 40 MG tablet TAKE 1 TABLET BY MOUTH AT BEDTIME  3/25/2025  Evening    gabapentin (NEURONTIN) 300 MG capsule Take 900 mg by mouth at bedtime.  3/25/2025 Bedtime    gabapentin (NEURONTIN) 300 MG capsule Take 3 capsules (900 mg) by mouth 2 times daily (Patient taking differently: Take 600 mg by mouth every morning.)  3/26/2025 Morning    latanoprost (XALATAN) 0.005 % ophthalmic solution INSTILL 1 DROP INTO BOTH EYES EVERY DAY AS DIRECTED PT WILL NEED TO BE SEEN FOR FUTURE REFILLS  3/25/2025 Evening    levothyroxine (SYNTHROID/LEVOTHROID) 112 MCG tablet Take 1 tablet (112 mcg) by mouth daily  3/26/2025 Morning    losartan (COZAAR) 50 MG tablet Take 1 tablet (50 mg) by mouth 2 times daily  3/26/2025 Morning    magnesium oxide (MAG-OX) 400 MG tablet Take 400 mg by mouth daily.  3/25/2025 Bedtime    Multiple Vitamins-Minerals (MULTIPLE VITAMINS/WOMENS PO) Take by mouth daily  3/25/2025 Evening    nitroFURantoin macrocrystal-monohydrate (MACROBID) 100 MG capsule Take 1 capsule (100 mg) by mouth daily  3/26/2025 Morning    ondansetron (ZOFRAN ODT) 4 MG ODT tab Take 1 tablet (4 mg) by mouth every 6 hours as needed for nausea or vomiting.  Unknown

## 2025-03-27 NOTE — CONSULTS
"[facility]  Neurology Consultation    Patient Name:  Marylee Woods  MRN:  [id number]    :  1955  Date of Service:  2025  Primary care provider:  Carolina Pulliam      Neurology consultation service was asked to see Marylee Woods by Dr. Rojas to evaluate weakness, altered mental status.    Chief Complaint:  weakness, altered mental status    History of Present Illness:   Marylee Woods is a 69 year old female with history of multiple sclerosis who presents with worsening altered mental status in the setting of urinary tract infection.  Neurology consulted to assess for multiple sclerosis flare.    Notably, she has been doing poorly for the last couple of weeks.  She had a particularly bad episode last Tuesday of altered mental status calling for her mom and being confused.  This has however fluctuated.  She is being treated for multiple infections over the last couple weeks including norovirus and now a urinary tract infection.  Overall she is feeling grossly weaker, having more difficulty with assisting with transfers, although she is dependent on  for assistance.  She reports this has overall weakness and no new focal symptoms.    The other symptoms she is concerned about is \"head drop\".  She will be sitting in her wheelchair for a period of time when all of a sudden her head will drop down.  Immediately with her head falling down she will wake up in chair quite back up.  This occurs while sitting and has not created a fall.  She does note that this is worse on days after she used a breathing machine at night.  She does see a sleep physician as an outpatient and she reports a question for narcolepsy but they have not done a study for it.  He has told her he thinks this might be cataplexy.    Residual symptoms from her multiple sclerosis include chronic left greater than right weakness in upper extremities and significant weakness, paraplegia in the " "lower extremities slightly worse on the left side as well.  She is wheelchair-bound requires assistance with transfers but prior to the last couple weeks has typically been able to participate in assistance with the transfer with her arms.    It is unclear when her last MS flare truly was.  She thinks she potentially had imaging most recently as last summer.  She did have an MRI on Friday, was told this was \"stable\" but no contrast was utilized and I am unable to see previous images for comparison.    She was started on Briumvi this February.  She has already completed her first and second infusions, next infusion is due in 5 months.  She was previously on Lemtrada, this was transitioned to Briumvi \"because the blood test said that my neuron breakdown levels were the highest my neurologist is ever seen\".  She does not recall an image being completed around that time to evaluate for flares.    Per chart review, limited neurology notes found as provider is at outside facility. Was able to find this brief summary on care everywhere:  Pt and  present virtually for hospital follow up, symptom management and discuss plan for her MS. Pt was hospitalized 11/16-11/30/23 for Klebsiella UTI, multiple fractures after fall and metabolic encephalopathy. Pt fell on 11/16/23 resulting in a right distal femur fracture and left medial malleolus fracture, while in the ER she was given multiple narcotics resulting in altered level of consciousness and subsequently intubated to protect her airway. Brain CT/MRI with no acute findings. EEG was negative for seizure activity. UTI was treated with IV ceftriaxone. Pt was discharged home on po antibiotics. Pt was readmitted to the hospital on 1/5-1/18/24 for hypovolemic hyponatremia, UTI, yeast infection and stage 2 left coccyx pressure ulcer. Pt presented to hospital with worsening confusion and after PCP informed her that her sodium was low. During hospital stay she was noted to have " hypovolemic hyponatremia secondary to use of hydrochlorothiazide in a setting with limited po intake and diarrhea secondary to antibiotic use. The concern of SIADH and pt. was placed on fluid restriction. UTI was treated with Ceftriaxone. Pt was discharged home with home PT/OT and home wound care.Discussed that both hospital stays were not related to MS and that she should continue care with home rehab services and home care. Pt and  state that since she has been home she has developed a new symptom of body twitching followed by her falling asleep- please see  problem point for Narcolepsy. Pt was last seen on 10/2023 at that point she was to initiate Briumvi. Due to elevated Octave MS lab 6.0, This was postponed due to recent hospital stays and at this time pt would like to wait to start any DMT until she follows up with Dr. Redman in the summer of 2024. Since patients has not started any DMT, recommended pt have EDSS appointment in spring 2024 after she is seen by sleep medicine.       ROS  A comprehensive ROS was performed and pertinent findings were included in HPI.     PMH  Past Medical History:   Diagnosis Date    Acute cystitis without hematuria     Acute pain of right knee     Atypical ductal hyperplasia of left breast 02/2022    Candida esophagitis - 2019 (H)     CKD (chronic kidney disease) stage 3, GFR 30-59 ml/min (H)     Community acquired pneumonia, unspecified laterality     Cough, unspecified type 04/05/2024    Depression     Epigastric pain     Former tobacco use     Fracture of femur, distal, left, closed (H)     Gastro-oesophageal reflux disease     Graves disease     History of fracture of fibula     History of tibial fracture     HSV (herpes simplex virus) infection     Hyperlipidemia with target LDL less than 130     Hyponatremia     L tib fx s/p IM nailing     Lung nodules     Currently managed with follow up imaging by [facility] result Team.       Multiple  sclerosis (H)     Osteoporosis     Postablative hypothyroidism      Past Surgical History:   Procedure Laterality Date    C/SECTION, LOW TRANSVERSE  1992    COLONOSCOPY  2007    COLONOSCOPY N/A 1/26/2017    Procedure: COMBINED COLONOSCOPY, SINGLE OR MULTIPLE BIOPSY/POLYPECTOMY BY BIOPSY;  Surgeon: Mayo Adkins MD, MD;  Location:  GI    ESOPHAGOSCOPY, GASTROSCOPY, DUODENOSCOPY (EGD), COMBINED  1/26/2017    Dr. Adkins [facility]    ESOPHAGOSCOPY, GASTROSCOPY, DUODENOSCOPY (EGD), COMBINED N/A 1/26/2017    Procedure: COMBINED ESOPHAGOSCOPY, GASTROSCOPY, DUODENOSCOPY (EGD), BIOPSY SINGLE OR MULTIPLE;  Surgeon: Mayo Adkins MD, MD;  Location:  GI    ESOPHAGOSCOPY, GASTROSCOPY, DUODENOSCOPY (EGD), COMBINED N/A 4/20/2023    Procedure: ESOPHAGOGASTRODUODENOSCOPY, WITH BIOPSY;  Surgeon: Cristina Zuniga MD;  Location: [facility]    HIP SURGERY  2009    femur ortho surgery    LAPAROSCOPIC CHOLECYSTECTOMY  6/24/2014    Procedure: LAPAROSCOPIC CHOLECYSTECTOMY;  Surgeon: Randy Bailey MD;  Location:  [facility]    LUMPECTOMY BREAST Left 3/31/2022    Procedure: LEFT Radiofrequency Identification Seed-Localized Lumpectomy;  Surgeon: Amanda Liu MD;  Location: [facility] OR    OPEN REDUCTION INTERNAL FIXATION FEMUR DISTAL Left 11/1/2018    Procedure: OPEN REDUCTION INTERNAL FIXATION LEFT FEMUR DISTAL;  Surgeon: Jose Valadez MD;  Location: [facility] OR    OPEN REDUCTION INTERNAL FIXATION RODDING INTRAMEDULLARY TIBIA  4/14/2013    Procedure: OPEN REDUCTION INTERNAL FIXATION RODDING INTRAMEDULLARY TIBIA;;  Surgeon: Sajan Cast MD;  Location: [facility]    ORTHOPEDIC SURGERY  2009    surgery right upper femur fx near hip       Medications   I have personally reviewed the patient's medication list.     Allergies  I have personally reviewed the patient's allergy list.     Social History  Former smoker, social alcohol drinker, denies recreational drug use    Family History    Noncontributory      Physical Examination  "  Vitals: /62 (BP Location: Left arm)   Pulse 91   Temp 98.3  F (36.8  C) (Oral)   Resp 18   Ht 1.727 m (5' 8\")   Wt 67.7 kg (149 lb 4 oz)   LMP 01/31/2004 (Approximate)   SpO2 95%   BMI 22.69 kg/m    General: Lying in bed, NAD  Head: NC/AT  Respiratory: non-labored on RA  Psych: Mood pleasant, affect congruent    Neuro:   Mental status: Awake, alert, attentive, oriented to self, time, place, and circumstance. Language is fluent and coherent with intact comprehension of complex commands, naming and repetition.  Tangential.  Cranial nerves: VFF, PERRL, conjugate gaze, EOMI with some saccadic pursuit, facial sensation intact, face symmetric, shoulder shrug strong, tongue/uvula midline, no dysarthria.   Motor: Atrophy noted in LUE > RUE and bilateral lower extremities.  Tone reduced throughout lower extremities.  No abnormal movements. 5/5 strength in right deltoids, biceps, triceps, hand , 4/5 strength in left equivalents except 3/5 with finger extension, wrist extension, and finger abduction.  1/5 strength in left hip flexion, 2/5 in right hip flexion, 1/5 in bilateral knee extension/flexion, left dorsiflexion 1/5, right dorsiflexion 2/5, able to wiggle toes on the left slightly.    Reflexes: Decreased throughout, symmetric.  No clonus noted.   Sensory: Intact to light touch in BUE, diminished on left side   Coordination: FNF without ataxia or dysmetria.   Gait: Wheelchair-bound at baseline    Investigations   I have personally reviewed pertinent labs, tests, and radiological imaging. Discussion of notable findings is included under Impression.     MRI brain without contrast 3/21/2025 - personally reviewed & notably consistent with history of demyelinating disorder.  IMPRESSION:  1.  No acute infarct, mass, hemorrhage, or herniation.  2.  Nonspecific white matter lesions which could be due to chronic microvascular ischemic disease and/or demyelination given the provided history. No prior images " available for comparison at the time of this dictation.    Impression  Marylee Woods is a 69 year old female with history of multiple sclerosis who presents with worsening altered mental status and overall weakness in the setting of prolonged course of norovirus followed by urinary tract infection.  Per patient report, her exam seems to be very consistent with previous MS flares albeit with an overall generalized weakness.  We discussed that it is as expected to be a bit weaker diffusely especially with prolonged illness as she has been dealing with in addition to deconditioning.  At this point I am not appreciating any new focal findings based off of the patient's report of her baseline.  Unfortunately it is very difficult for me to say for complete certainty whether she has had any recent flares as there are no previous images on file for comparison/consideration of new flare.  On the MRI I am able to see from Friday, contrast was not used so unable to definitively say whether any active lesions were present. That being said, I see no evidence from clinical history of gradual decline during illness, multiple ED visits/hospitalizations, and no new focal findings on exam supporting any MS flare and this was discussed with the patient. I would recommend against steroid burst given no clear flare and also an ongoing infectious process.    As for her concern for the head drop spells, I suspect is not related to her multiple sclerosis directly.  It is possible this is an hypotensive episode with prolonged sitting or perhaps sleep-related phenomenon associated with poor sleep quality at night, particularly if she notices that this seems to worsen if she uses her CPAP.  Mostly likely this is her falling asleep due to poor sleep quality. She has notably been seen by a sleep physician [facility] to evaluate for narcolepsy, but it seems like this has not been fully evaluated.  This would be  something she should follow-up with as an outpatient and we discussed this would not be something we could evaluate during hospitalization.     Recommendations  - no medication recommendations at this time, would not recommend steroids  - Recommending evaluate blood pressure while sitting to assess for hypotension, low suspicion for this but if hypotensive may warrant decreasing antihypertensives  - Recommend following up with sleep neurologist as an outpatient to assess for sleep-related etiology of the head drop - she currently follows with Dr. Deal at [facility]  - Recommend follow-up with Dr. Redman, MS outpatient provider, after discharge  -Highly recommend consideration of rehab placement to optimize treatment of deconditioning after prolonged illness    Thank you for involving Neurology in the care of Marylee Woods.  Neurology will sign off at this time, but please do not hesitate to call with additional questions/concerns.      Leia Tai MD   of Neurology  [facility]  Department of Neurology     100 minutes spent on the date of the encounter doing chart review, history and exam, documentation and further activities as noted above. Including discussion with outpatient nurse practitioner, Ekta, from MS clinic.

## 2025-03-27 NOTE — PROGRESS NOTES
RECEIVING UNIT ED HANDOFF REVIEW    ED Nurse Handoff Report was reviewed by: Domonique Pitt RN on March 27, 2025 at 1:58 AM

## 2025-03-27 NOTE — PROGRESS NOTES
"[facility]  Hospitalist Progress Note    Assessment & Plan   Marylee Woods is a 69 year old female with significant past medical history for multiple sclerosis wheelchair bound, malignant neoplasm of left breast status postlumpectomy in 2022, obstructive sleep apnea, hypertension, anemia, and depression admitted on 3/2/625 with intermittent confusion and weakness.    Patient was recently hospitalized 3/9-3/14 for UTI and acute colitis due to Norovirus. She was treated with IV Ampicillin -> PO Amoxicillin and discharged home.     She was doing well for a few days after discharge but then started to have some intermittent confusion and description of \"fullness\" in the back of her head. She has had intermittent head drop, and dropping objects. Appears spacey at times. She had an outpatient MRI brain without contrast 3/21 which showed non specific white matter lesions, patient states results were faxed to her neurologist but she did not hear back from them. She had a UA done via PCP on 3/20 for dysuria which was contaminated, and started Amoxicillin 3/24 for UTI symptoms. She reports she was having some N/V with those symptoms, which have since resolved along with the dysuria. Denies fevers or chills.     Acute cystitis  *Patient had UA done 3/20 for workup of her intermittent confusion, which was a contaminated sample. She reported new dysuria on 3/24 and was instructed to take Amoxicillin (patient had leftover prescription from previous hospitalization) which she started on 3/25.  *UA on admission had infectious markers and yeast. Urine culture 3/26 was <10k urogenital germaine, but patient had been on abx. Afebrile, no leukocytosis.  - S/p IV Rocephin, will change to PO Amoxicillin to complete 5-7 day course given her urinary symptoms    Multiple sclerosis  Wheelchair-bound  *Follows with Dr. Redman and receives Briumvi infusions as outpatient. At baseline, utilizes wheelchair and slide board " for transfers.  *As noted above, has been having intermittent confusion and lethargy, head drop and dropping objects at times. She had MRI brain w/o contrast on 3/21 which showed non specific white matter lesions. Unclear how much of her presentation is due to possible UTI vs related to MS flare or side effect from Briumvi infusion.  - General neurology consult  - Continue PTA baclofen and gabapentin     Acute metabolic/infectious encephalopathy  *Due to possible UTI vs MS flare vs related to Briumvi   - Continue management as outlined above  - General neurology following     Generalized weakness  - PT consult     Hypertension  - Continue PTA amlodopine and losartan  - IV hydralazine for SBP >180     Depression  - Continue PTA cymbalta     History of malignant neoplasm left breast status postlumpectomy  *Follows with Dr. Martinez oncology     Obstructive sleep apnea  - Continue PTA armodafinil     Hypothyroidism  - Continue PTA levothyroxine      Glaucoma   - Continue PTA latanoprost     Clinically Significant Risk Factors Present on Admission                   # Hypertension: Noted on problem list               # Financial/Environmental Concerns:             Diet: Regular Diet Adult     DVT Prophylaxis: Pneumatic Compression Devices   Verduzco Catheter: Not present  Lines: None     Cardiac Monitoring: None  Code Status: Full Code      Disposition Plan       Expected Discharge Date: 03/27/2025              Entered: Aisha Salguero PA-C 03/27/2025, 9:26 AM        Medically Ready for Discharge: Anticipated Tomorrow      The patient's care was discussed with the Attending Physician, Dr. Molina and Patient.    The patient has been discussed with Dr. Molina, who agrees with the assessment and plan at this time.    Aisha Salguero PA-C  [facility]  Securely message with the Vocera Web Console (learn more here)        Interval History   Patient states she was having intermittent head drop this  past week, and dropping objects. She has been intermittently confused. She did have some dysuria earlier, which has improved since she was told to restart Amoxicillin by PCP. No further N/V. She did have outpatient MRI w/o contrast last week that was faxed to her neurologist but she states she did not hear back regarding their interpretation of results.    -Data reviewed today: I reviewed all new labs and imaging results over the last 24 hours. I personally reviewed no images or EKG's today.    Physical Exam   Temp: 98.3  F (36.8  C) Temp src: Oral BP: 127/62 Pulse: 91   Resp: 18 SpO2: 95 % O2 Device: None (Room air)    Vitals:    03/27/25 0230   Weight: 67.7 kg (149 lb 4 oz)     Vital Signs with Ranges  Temp:  [97.3  F (36.3  C)-98.3  F (36.8  C)] 98.3  F (36.8  C)  Pulse:  [] 91  Resp:  [16-18] 18  BP: (121-146)/(61-78) 127/62  SpO2:  [94 %-100 %] 95 %  No intake/output data recorded.      Constitutional: Awake, alert, cooperative, no apparent distress.    ENT: Normocephalic, without obvious abnormality, atraumatic. Normal sclera.    Neck: Supple, symmetrical, trachea midline  Pulmonary: No increased work of breathing, diminished  Cardiovascular: Regular rate and rhythm  GI: Normal bowel sounds, soft, non-distended, non-tender.   Skin: Visualized skin appeared clear.  Neuro: Oriented x 3. Moves all extremities spontaneously. No focal neuro deficits.  Psych:  Normal affect and mood  Extremities: No gross deformities noted. Calves non-tender b/l. No pitting edema b/l LE      Medical Decision Making       45 MINUTES SPENT BY ME on the date of service doing chart review, history, exam, documentation & further activities per the note.         Medications   Current Facility-Administered Medications   Medication Dose Route Frequency Provider Last Rate Last Admin     Current Facility-Administered Medications   Medication Dose Route Frequency Provider Last Rate Last Admin    acyclovir (ZOVIRAX) tablet 400 mg  400 mg  Oral Q12H Ivanna Rojas MD   400 mg at 03/27/25 0130    amLODIPine (NORVASC) tablet 5 mg  5 mg Oral Daily Ivanna Rojas MD   5 mg at 03/27/25 0840    armodafinil (NUVIGIL) tablet TABS 200 mg  200 mg Oral QAM Ivanna Rojas MD        baclofen (LIORESAL) tablet 20 mg  20 mg Oral Daily Ivanna Rojas MD   20 mg at 03/27/25 0840    baclofen (LIORESAL) tablet 30 mg  30 mg Oral QPM Ivanna Rojas MD        cefTRIAXone (ROCEPHIN) 2 g vial to attach to  ml bag for ADULTS or NS 50 ml bag for PEDS  2 g Intravenous Q24H Ivanna Rojas MD        DULoxetine (CYMBALTA) DR capsule 80 mg  80 mg Oral Daily Ivanna Rojas MD   80 mg at 03/27/25 0840    famotidine (PEPCID) tablet 40 mg  40 mg Oral At Bedtime Ivanna Rojas MD   40 mg at 03/27/25 0129    gabapentin (NEURONTIN) capsule 900 mg  900 mg Oral BID Ivanna Rojas MD   900 mg at 03/27/25 0840    latanoprost (XALATAN) 0.005 % ophthalmic solution 1 drop  1 drop Both Eyes Daily Ivanna Rojas MD        levothyroxine (SYNTHROID/LEVOTHROID) tablet 112 mcg  112 mcg Oral Daily Ivanna Rojas MD   112 mcg at 03/27/25 0840    losartan (COZAAR) tablet 50 mg  50 mg Oral BID Ivanna Rojas MD   50 mg at 03/27/25 0840    polyethylene glycol (MIRALAX) Packet 17 g  17 g Oral Daily Ivanna Rojas MD   17 g at 03/27/25 0841    senna-docusate (SENOKOT-S/PERICOLACE) 8.6-50 MG per tablet 1 tablet  1 tablet Oral BID Ivanna Rojas MD        Or    senna-docusate (SENOKOT-S/PERICOLACE) 8.6-50 MG per tablet 2 tablet  2 tablet Oral BID Ivanna Rojas MD           Data   Recent Labs   Lab 03/26/25  1911 03/20/25  1303   WBC 5.3 6.1   HGB 11.0* 10.2*   MCV 90 92    252    136   POTASSIUM 4.9 5.2   CHLORIDE 102 100   CO2 28 23   BUN 17.4 18.0   CR 0.90 1.22*   ANIONGAP 10 13   JORDON 9.8 9.9   * 97   ALBUMIN 3.9 3.8   PROTTOTAL 7.9 7.0   BILITOTAL 0.2 0.2   ALKPHOS 127 83    ALT 10 12   AST 19 23       No results found for this or any previous visit (from the past 24 hours).

## 2025-03-27 NOTE — PLAN OF CARE
Goal Outcome Evaluation:      Plan of Care Reviewed With: patient          Outcome Evaluation: discharge to home pending antibiotic plan and plan of care patient to resume outpatient physical therapy

## 2025-03-27 NOTE — PLAN OF CARE
Goal Outcome Evaluation:    [id number]  Orientation: AOx4, forgetful at times  Aggression Stop Light: Green  Activity: Up A2 and lift, T/R q 2 hrs.  Diet/BS Checks: Reg diet.  Tele: N/A  IV Access/Drains: L PIV SL.  Pain Management: Pt reports 4/10 RLE nerve pain but declines any pain medications.  Abnormal VS/Results: VSS on RA ex tachy at times.  Bowel/Bladder: Incontinent of B/B, 2 wet briefs. No BM this shift.  Skin/Wounds: Healed sacral scar. Blanchable redness to sacrum, barrier cream applied. Scattered bruising throughout. +1-2 BLE edema noted.  Consults: PT and SW following. Neuro signed off.  D/C Disposition: PT recommending discharge to TCU, SW working on sending out referrals.

## 2025-03-27 NOTE — CONSULTS
Care Management Initial Consult    General Information  Assessment completed with: Patient, Patient  Type of CM/SW Visit: CM Role Introduction  Outpatient therapy every Wednesday at [facility]    Primary Care Provider verified and updated as needed: Yes   Readmission within the last 30 days: other (see comments) (readmission for UTI vs Neuro work up)   Return Category: New Diagnosis  Reason for Consult: discharge planning  Advance Care Planning: not in chart        Communication Assessment  Patient's communication style: spoken language (English or Bilingual)    Hearing Difficulty or Deaf: no   Wear Glasses or Blind: yes    Cognitive  Cognitive/Neuro/Behavioral: .WDL except, all  Level of Consciousness: alert  Arousal Level: opens eyes spontaneously  Orientation: oriented x 4, other (see comments) (but slight intermittent confusion)  Mood/Behavior: calm, cooperative  Best Language: 0 - No aphasia  Speech: clear, logical, spontaneous    Living Environment:   People in home: child(ally), adult, spouse  Manuel  Current living Arrangements: house      Able to return to prior arrangements: yes       Family/Social Support:  Care provided by: self, spouse/significant other, child(ally), other (see comments) (outpatient therapy)  Provides care for: no one  Marital Status:   Support system: , Children  Manuel       Description of Support System: Supportive, Involved    Support Assessment: Adequate family and caregiver support, Adequate social supports    Current Resources:   Patient receiving home care services: No        Community Resources: None  Equipment currently used at home: grab bar, toilet, walker, rolling, wheelchair, manual, wheelchair, power  Supplies currently used at home: None    Employment/Financial:  Employment Status: retired        Financial Concerns: none   Referral to Financial Worker: No       Does the patient's insurance plan have a 3 day qualifying hospital stay waiver?   Yes     Which insurance plan 3 day waiver is available? [employer]    Will the waiver be used for post-acute placement? Undetermined at this time    Lifestyle & Psychosocial Needs:  Social Drivers of Health     Food Insecurity: Low Risk  (3/27/2025)    Food Insecurity     Within the past 12 months, did you worry that your food would run out before you got money to buy more?: No     Within the past 12 months, did the food you bought just not last and you didn t have money to get more?: No   Depression: Not at risk (2/19/2025)    PHQ-2     PHQ-2 Score: 2   Housing Stability: Low Risk  (3/27/2025)    Housing Stability     Do you have housing? : Yes     Are you worried about losing your housing?: No   Tobacco Use: Medium Risk (3/21/2025)    Patient History     Smoking Tobacco Use: Former     Smokeless Tobacco Use: Never     Passive Exposure: Current   Financial Resource Strain: Low Risk  (3/27/2025)    Financial Resource Strain     Within the past 12 months, have you or your family members you live with been unable to get utilities (heat, electricity) when it was really needed?: No   Alcohol Use: Not on file   Transportation Needs: Low Risk  (3/27/2025)    Transportation Needs     Within the past 12 months, has lack of transportation kept you from medical appointments, getting your medicines, non-medical meetings or appointments, work, or from getting things that you need?: No   Physical Activity: Inactive (4/2/2024)    Exercise Vital Sign     Days of Exercise per Week: 0 days     Minutes of Exercise per Session: 20 min   Interpersonal Safety: Low Risk  (9/24/2024)    Interpersonal Safety     Do you feel physically and emotionally safe where you currently live?: Yes     Within the past 12 months, have you been hit, slapped, kicked or otherwise physically hurt by someone?: No     Within the past 12 months, have you been humiliated or emotionally abused in other ways by your partner or ex-partner?: No   Stress: No Stress  Concern Present (4/2/2024)    [employer] - Occupational Stress Questionnaire     Feeling of Stress : Only a little   Social Connections: Unknown (4/2/2024)    Social Connection and Isolation Panel [NHANES]     Frequency of Communication with Friends and Family: Not on file     Frequency of Social Gatherings with Friends and Family: Once a week     Attends Mandaen Services: Not on file     Active Member of Clubs or Organizations: Not on file     Attends Club or Organization Meetings: Not on file     Marital Status: Not on file   Health Literacy: Not on file       Functional Status:  Prior to admission patient needed assistance:   Dependent ADLs:: Independent, Eating, Wheelchair-independent  Dependent IADLs:: Cleaning, Cooking, Laundry, Shopping, Transportation       Mental Health Status:  Mental Health Status: Other (see comment) (Outpatient SW working on mental health resources for the patient)       Chemical Dependency Status:  Chemical Dependency Status: No Current Concerns             Values/Beliefs:  Spiritual, Cultural Beliefs, Mandaen Practices, Values that affect care: yes  Description of Beliefs that Will Affect Care: Jourdan            Discussed  Partnership in Safe Discharge Planning  document with patient/family: No    Additional Information:  Writer met with the patient at the bedside. Patient is A+Ox4. Writer went over role and scope of safe discharge planning. Writer went over Medicare for GONZALEZ and answered all questions.  Patient is hopeful to have recommendations for antibiotics and discharge for today pending final recommendations.  Patient noted prior to this admission she resides at home with a Spouse and Daughter who assist her as needed.  She uses both a manual wheelchair for use of arm strength and a power wheelchair in the home. All needs met on the main floor of the home.  Patient noted her Spouse is a Hospice Chaplain and works out of the home 3xweek. She  is able to get around the home, make food and her power wheelchair rises to allow her to grab from shelves etc.  Patient noted her spouse does cares/bathing and she washes her hair.  Patient attends outpatient therapy weekly and hopes to resume as soon as possible.  Patient does not anticipate any further needs for discharge at this time.  Writer received hand in from outpatient clinic and they are working on mental health resources for the patient.  Patient is aware that she will require outpatient follow up with PCP and will schedule as needed.  Patient sees [facility] and has close contact with her provider.  Patient has her wheelchair with her and anticipates her Spouse will be coming in today and will provide transportation to home.      Judith Rodney RN, BSN, AC   Care Transitions Specialist  [facility]  Care Transitions Specialist  [address] [place]. [address]  [email or web address]  Office: [phone] Fax: [phone]  [facility]

## 2025-03-27 NOTE — PLAN OF CARE
Goal Outcome Evaluation:         Orientation: history of Multiple sclerosis, wheelchair bound, neurogenic bladder, recurrent UTI, CKD, HTN   Admitted for worsening intermittent confusion and weakness  Aggression Stop Light: green  Activity: lift  Diet/BS Checks: regular  Tele:  none  IV Access/Drains: left forearm PIV, saline locked  Pain Management: denies pain for the most part. Intermittent muscle spasm, patient is on baclofen.   Abnormal VS/Results: vitals are with in normal limits  Bowel/Bladder: incontinent of urine and bladder. Constipated. On Isa lax. Patient does not have regular bowel movement. Goes without for a week or two. Declined senna  Skin/Wounds: with in normal limits. Coccyx and buttocks blanchable redness. Paste applied.  Consults: Neuro, PT  D/C Disposition: TCU, likely tomorrow  Other Info: wheel chair bound.

## 2025-03-27 NOTE — H&P
[facility]    History and Physical  Hospitalist       Date of Admission:  3/26/2025    Assessment & Plan   Marylee Woods is a 69 year old female with a history of multiple sclerosis, wheelchair-bound, neurogenic bladder with incontinence, frequent UTIs, CKD 3, HTN, HLP, GERD, postablative hypothyroidism, HSV, MDD with anxiety, central sleep apnea, known lung nodules, breast cancer who was recently hospitalized between 3/9-3/14/2025 with acute cystitis as well as norovirus colitis and now presents with concerns for worsening intermittent confusion and weakness.    Acute metabolic/infectious encephalopathy  ?  Recurrent/persistent UTI  H/o MS, wheelchair-bound- concern for side effects from drug/?  Possibility of MS progression/flare  Generalized weakness  After discharge on 3/14, patient was apparently doing okay for a few days but then noted to have some intermittent.'s of feeling unwell/confusion-patient describes a feeling of fuzziness in the back of her head.   notes that she would drop objects randomly from her hands, appeared weak and intermittently confused.  She finished her course of amoxicillin as prescribed at discharge.  They contacted her primary neurologist who recommended MRI brain which was done on 3/21-did not show any acute findings, showed nonspecific white matter lesions.  Neurologist apparently told patient and  that MRI appeared stable.  They also had repeat urine culture done for patient on 3/20 that showed polymicrobial growth concerning for contaminant.  Outpatient provider apparently asked patient to resume taking amoxicillin from a previous prescription that she had leftover and patient has been taking it for last few days with concern that she might have persistent UTI.  Today brought back to the ED due to concerns for ongoing confusion and weakness.  In the ED noted to be with stable vitals, afebrile, not hypoxic.  Patient is alert and answering  most questions appropriately though appears forgetful and weak and defers most answers to her .  Lab work all unremarkable.  Normal lactic acid.  UA showed trace blood, large LE, moderate bacteria, moderate yeast.  Negative COVID/RSV/flu PCR.  Concern for persistent UTI and patient was given 1 dose IV ceftriaxone and admitted for further management.  *Follows with Dr. Redman and receives Briumvi infusions for MS as outpatient. At baseline, utilizes wheelchair and slide board for transfers.    -Registered observation  -Follow urine culture and blood culture.  Continue IV ceftriaxone.  She could just be deconditioned from her recent UTI/norovirus infection.  States diarrhea has fully resolved.  Noted elevated CRP around 11.  -Consult to neurology.  She last received Briumvi infusion in early March.  Apparently just started it in February?.  Unclear if this would be related to the drug,?  Progression of underlying MS/flare.  -PT consult  - Continued PTA baclofen and gabapentin     Hypertension  - Continued PTA amlodopine and losartan     Depression  - Continued PTA cymbalta     History of malignant neoplasm left breast status postlumpectomy  *Follows with Dr. Martinez oncology     Obstructive sleep apnea  - Continued PTA armodafinil     Hypothyroidism  - Continued PTA levothyroxine      Glaucoma   - Continued PTA latanoprost     DVT Prophylaxis: Pneumatic Compression Devices  Code Status: Full Code     Expected Discharge Date: 03/27/2025             Medically Ready for Discharge: Anticipated Tomorrow pending workup, neurology consult    Ivanna Rojas MD    Medical Decision Making       Please see A&P for additional details of medical decision making.         Clinically Significant Risk Factors Present on Admission                   # Hypertension: Noted on problem list               # Financial/Environmental Concerns:             Primary Care Physician   Carolina Pulliam    Chief Complaint   Weakness and  confusion    History is obtained from the patient, patient's , ED provider and chart review    History of Present Illness   Marylee Woods is a 69 year old female with a history of multiple sclerosis, wheelchair-bound, neurogenic bladder with incontinence, frequent UTIs, CKD 3, HTN, HLP, GERD, postablative hypothyroidism, HSV, MDD with anxiety, central sleep apnea, known lung nodules, breast cancer who was recently hospitalized between 3/9-3/14/2025 with acute cystitis as well as norovirus colitis and now presents with concerns for worsening intermittent confusion and weakness.    After discharge on 3/14, patient was apparently doing okay for a few days but then noted to have some intermittent.'s of feeling unwell/confusion-patient describes a feeling of fuzziness in the back of her head.   notes that she would drop objects randomly from her hands, appeared weak and intermittently confused.  She finished her course of amoxicillin as prescribed at discharge.  They contacted her primary neurologist who recommended MRI brain which was done on 3/21-did not show any acute findings, showed nonspecific white matter lesions.  Neurologist apparently told patient and  that MRI appeared stable.  They also had repeat urine culture done for patient on 3/20 that showed polymicrobial growth concerning for contaminant.  Outpatient provider apparently asked patient to resume taking amoxicillin from a previous prescription that she had leftover and patient has been taking it for last few days with concern that she might have persistent UTI.  Today brought back to the ED due to concerns for ongoing confusion and weakness.  In the ED noted to be with stable vitals, afebrile, not hypoxic.  Lab work all unremarkable.  UA showed trace blood, large LE, moderate bacteria, moderate yeast.  Negative COVID/RSV/flu PCR.  Concern for persistent UTI and patient was given 1 dose IV ceftriaxone and admitted for further  management.    Past Medical History    I have reviewed this patient's medical history and updated it with pertinent information if needed.   Past Medical History:   Diagnosis Date    Acute cystitis without hematuria     Acute pain of right knee     Atypical ductal hyperplasia of left breast 02/2022    Candida esophagitis - 2019 (H)     CKD (chronic kidney disease) stage 3, GFR 30-59 ml/min (H)     Community acquired pneumonia, unspecified laterality     Cough, unspecified type 04/05/2024    Depression     Epigastric pain     Former tobacco use     Fracture of femur, distal, left, closed (H)     Gastro-oesophageal reflux disease     Graves disease     History of fracture of fibula     History of tibial fracture     HSV (herpes simplex virus) infection     Hyperlipidemia with target LDL less than 130     Hyponatremia     L tib fx s/p IM nailing     Lung nodules     Currently managed with follow up imaging by [facility] result Team.       Multiple sclerosis (H)     Osteoporosis     Postablative hypothyroidism        Past Surgical History   I have reviewed this patient's surgical history and updated it with pertinent information if needed.  Past Surgical History:   Procedure Laterality Date    C/SECTION, LOW TRANSVERSE  1992    COLONOSCOPY  2007    COLONOSCOPY N/A 1/26/2017    Procedure: COMBINED COLONOSCOPY, SINGLE OR MULTIPLE BIOPSY/POLYPECTOMY BY BIOPSY;  Surgeon: Mayo Adkins MD, MD;  Location:  GI    ESOPHAGOSCOPY, GASTROSCOPY, DUODENOSCOPY (EGD), COMBINED  1/26/2017    Dr. Adkins [facility]    ESOPHAGOSCOPY, GASTROSCOPY, DUODENOSCOPY (EGD), COMBINED N/A 1/26/2017    Procedure: COMBINED ESOPHAGOSCOPY, GASTROSCOPY, DUODENOSCOPY (EGD), BIOPSY SINGLE OR MULTIPLE;  Surgeon: Mayo Adkins MD, MD;  Location:  GI    ESOPHAGOSCOPY, GASTROSCOPY, DUODENOSCOPY (EGD), COMBINED N/A 4/20/2023    Procedure: ESOPHAGOGASTRODUODENOSCOPY, WITH BIOPSY;  Surgeon: Cristina Zuniga MD;  Location:  GI    HIP  SURGERY  2009    femur ortho surgery    LAPAROSCOPIC CHOLECYSTECTOMY  6/24/2014    Procedure: LAPAROSCOPIC CHOLECYSTECTOMY;  Surgeon: Randy Bailey MD;  Location: [facility]    LUMPECTOMY BREAST Left 3/31/2022    Procedure: LEFT Radiofrequency Identification Seed-Localized Lumpectomy;  Surgeon: Amanda Liu MD;  Location: [facility] OR    OPEN REDUCTION INTERNAL FIXATION FEMUR DISTAL Left 11/1/2018    Procedure: OPEN REDUCTION INTERNAL FIXATION LEFT FEMUR DISTAL;  Surgeon: Jose Valadez MD;  Location: [facility]    OPEN REDUCTION INTERNAL FIXATION RODDING INTRAMEDULLARY TIBIA  4/14/2013    Procedure: OPEN REDUCTION INTERNAL FIXATION RODDING INTRAMEDULLARY TIBIA;;  Surgeon: Sajan Cast MD;  Location: [facility]    ORTHOPEDIC SURGERY  2009    surgery right upper femur fx near hip       Prior to Admission Medications   Prior to Admission Medications   Prescriptions Last Dose Informant Patient Reported? Taking?   Armodafinil 200 MG TABS   Yes No   Sig: Take 200 mg by mouth every morning   CRANBERRY PO   Yes No   Sig: Take by mouth. Take 1 capsule once daily.   Calcium Carbonate Antacid 600 MG CHEW   Yes No   Sig: Take 600 mg by mouth daily   DULoxetine (CYMBALTA) 20 MG capsule   No No   Sig: TAKE 1 CAPSULE BY MOUTH ONCE DAILY ALONG  WITH  A  60  MG  TABLET  FOR  A  TOTAL  DOSE  OF  80  MG  DAILY   DULoxetine (CYMBALTA) 60 MG capsule   No No   Sig: Take 1 capsule by mouth once daily   Patient taking differently: Take 1 capsule by mouth once daily, along with 20 mg for total of 80 mg daily dose.   Multiple Vitamins-Minerals (MULTIPLE VITAMINS/WOMENS PO)   Yes No   Sig: Take by mouth daily   acetaminophen (TYLENOL) 500 MG tablet   No No   Sig: Take 1-2 tablets (500-1,000 mg) by mouth every 8 hours as needed for mild pain or fever (greater than 101 degrees)   acyclovir (ZOVIRAX) 400 MG tablet   No No   Sig: TAKE 1 TABLET BY MOUTH EVERY 12 HOURS   amLODIPine (NORVASC) 5 MG tablet   No No   Sig: Take 1 tablet  by mouth once daily   baclofen (LIORESAL) 10 MG tablet   Yes No   Sig: Take 2 tablets (20 mg) in the morning and 3 tablets (30 mg) in the evening.   estradiol (ESTRACE) 0.1 MG/GM vaginal cream   No No   Sig: Place 2 g vaginally twice a week.   famotidine (PEPCID) 40 MG tablet   No No   Sig: TAKE 1 TABLET BY MOUTH AT BEDTIME   gabapentin (NEURONTIN) 300 MG capsule   No No   Sig: Take 3 capsules (900 mg) by mouth 2 times daily   latanoprost (XALATAN) 0.005 % ophthalmic solution   Yes No   Sig: INSTILL 1 DROP INTO BOTH EYES EVERY DAY AS DIRECTED PT WILL NEED TO BE SEEN FOR FUTURE REFILLS   levothyroxine (SYNTHROID/LEVOTHROID) 112 MCG tablet   No No   Sig: Take 1 tablet (112 mcg) by mouth daily   losartan (COZAAR) 50 MG tablet   No No   Sig: Take 1 tablet (50 mg) by mouth 2 times daily   nitroFURantoin macrocrystal-monohydrate (MACROBID) 100 MG capsule   No No   Sig: Take 1 capsule (100 mg) by mouth daily   ondansetron (ZOFRAN ODT) 4 MG ODT tab   No No   Sig: Take 1 tablet (4 mg) by mouth every 6 hours as needed for nausea or vomiting.      Facility-Administered Medications: None     Allergies   Allergies   Allergen Reactions    Bactrim [Sulfamethoxazole-Trimethoprim] Hallucination    Hydrochlorothiazide      Hyponatremia    Sulfamethizole     Amantadine      hallucinations    Budesonide     Budesonide-Formoterol Fumarate Rash       Social History   I have reviewed this patient's social history and updated it with pertinent information if needed. Marylee Woods  reports that she quit smoking about 3 years ago. Her smoking use included cigarettes. She has been exposed to tobacco smoke. She has never used smokeless tobacco. She reports current alcohol use. She reports that she does not use drugs.    Family History   I have reviewed this patient's family history and updated it with pertinent information if needed.   Family History   Problem Relation Age of Onset    Heart Disease Maternal Grandmother     Cancer Maternal  Grandfather     Heart Disease Paternal Grandfather     Heart Disease Mother     Heart Disease Father     Diabetes No family hx of     Coronary Artery Disease No family hx of     Hypertension No family hx of     Hyperlipidemia No family hx of     Cerebrovascular Disease No family hx of     Breast Cancer No family hx of     Colon Cancer No family hx of     Prostate Cancer No family hx of     Other Cancer No family hx of     Depression No family hx of     Anxiety Disorder No family hx of     Mental Illness No family hx of     Substance Abuse No family hx of     Anesthesia Reaction No family hx of     Asthma No family hx of     Osteoporosis No family hx of     Genetic Disorder No family hx of     Thyroid Disease No family hx of     Obesity No family hx of     Unknown/Adopted No family hx of        Review of Systems   The 10 point Review of Systems is negative other than noted in the HPI or here.     Physical Exam   Temp: 97.3  F (36.3  C)   BP: 125/72 Pulse: 90   Resp: 16 SpO2: 94 % O2 Device: None (Room air)    Vital Signs with Ranges  Temp:  [97.3  F (36.3  C)] 97.3  F (36.3  C)  Pulse:  [90] 90  Resp:  [16] 16  BP: (125)/(72) 125/72  SpO2:  [94 %-99 %] 94 %  0 lbs 0 oz    Constitutional: Awake, alert, cooperative, no apparent distress.  Appears weak and fatigued  Eyes: no icterus, EOMs intact  HEENT: Moist mucous membranes  Respiratory: Clear to auscultation bilaterally, no crackles or wheezing.  Cardiovascular: Regular rate and rhythm, normal S1 and S2, and no murmur noted.  GI: Soft, non-distended, non-tender, normal bowel sounds.  Skin: No rashes, no cyanosis, no edema.  Neurologic: Alert, engages in conversation and answering simple questions, weakness noted in both lower extremities, can only barely move her feet but no movement in legs, sensation intact in both legs, able to freely move upper extremities, speech is fluent but she is slow to answer questions  Psychiatric: Calm and pleasant, no obvious anxiety or  depression.     Data   Data reviewed today:  I personally reviewed MRI scan with result as noted above  Recent Labs   Lab 03/26/25  1911 03/20/25  1303   WBC 5.3 6.1   HGB 11.0* 10.2*   MCV 90 92   PLT  --  252    136   POTASSIUM 4.9 5.2   CHLORIDE 102 100   CO2 28 23   BUN 17.4 18.0   CR 0.90 1.22*   ANIONGAP 10 13   JORDON 9.8 9.9   * 97   ALBUMIN 3.9 3.8   PROTTOTAL 7.9 7.0   BILITOTAL 0.2 0.2   ALKPHOS 127 83   ALT 10 12   AST 19 23       No results found for this or any previous visit (from the past 24 hours).

## 2025-03-27 NOTE — PROGRESS NOTES
"   03/27/25 1200   Appointment Info   Signing Clinician's Name / Credentials (PT) Antione Pulliam DPT       Present no   Living Environment   People in Home spouse;child(ally), adult   Current Living Arrangements house   Home Accessibility wheelchair accessible   Transportation Anticipated family or friend will provide   Living Environment Comments Pt reports living in a house with her spouse and daughter. WC accessible. Spouse will provide assist. Pt's spouse is a hospice chaplain 3x/week outside of the home.   Self-Care   Usual Activity Tolerance moderate   Current Activity Tolerance poor   Regular Exercise No   Equipment Currently Used at Home grab bar, toilet;walker, rolling;wheelchair, manual;wheelchair, power   Fall history within last six months no   Activity/Exercise/Self-Care Comment Pt reports receiving assist with ADLs at baseline. Pt uses a slideboard for transfers into power WC. Pt currently attending OPPT.   General Information   Onset of Illness/Injury or Date of Surgery 03/27/25   Referring Physician Ivanna Rojas MD   Patient/Family Therapy Goals Statement (PT) \"To get better\"   Pertinent History of Current Problem (include personal factors and/or comorbidities that impact the POC) Per Chart: Marylee Woods is a 69 year old female with a history of multiple sclerosis, wheelchair-bound, neurogenic bladder with incontinence, frequent UTIs, CKD 3, HTN, HLP, GERD, postablative hypothyroidism, HSV, MDD with anxiety, central sleep apnea, known lung nodules, breast cancer who was recently hospitalized between 3/9-3/14/2025 with acute cystitis as well as norovirus colitis and now presents with concerns for worsening intermittent confusion and weakness.   Existing Precautions/Restrictions fall   Weight-Bearing Status - LLE full weight-bearing   Weight-Bearing Status - RLE full weight-bearing   Cognition   Orientation Status (Cognition) oriented x 3   Pain Assessment   Patient " Currently in Pain No   Integumentary/Edema   Integumentary/Edema no deficits were identifed   Posture    Posture Forward head position   Range of Motion (ROM)   Range of Motion ROM deficits secondary to weakness   ROM Comment AROM limited due to weakness in BLEs; PROM in BLEs WFL   Strength (Manual Muscle Testing)   Strength (Manual Muscle Testing) Deficits observed during functional mobility   Strength Comments BLE Hip Flexion: 2/5   Bed Mobility   Comment, (Bed Mobility) Supine>sit w/ mod A x 1   Transfers   Comment, (Transfers) Unable to complete slideboard transfer w/ A x 2   Balance   Balance Comments Impaired static sitting balance   Sensory Examination   Sensory Perception patient reports no sensory changes   Clinical Impression   Criteria for Skilled Therapeutic Intervention Yes, treatment indicated   PT Diagnosis (PT) Impaired transfers   Influenced by the following impairments Decreased activity tolerance; decreased balance; decreased strength   Functional limitations due to impairments Impaired functional mobility   Clinical Presentation (PT Evaluation Complexity) stable   Clinical Presentation Rationale Clinical judgement   Clinical Decision Making (Complexity) low complexity   Planned Therapy Interventions (PT) balance training;bed mobility training;home exercise program;patient/family education;transfer training;progressive activity/exercise   Risk & Benefits of therapy have been explained evaluation/treatment results reviewed;care plan/treatment goals reviewed;risks/benefits reviewed;current/potential barriers reviewed;participants voiced agreement with care plan;participants included;patient   PT Total Evaluation Time   PT Eval, Low Complexity Minutes (94505) 10   Physical Therapy Goals   PT Frequency 5x/week   PT Predicted Duration/Target Date for Goal Attainment 04/02/25   PT Goals Bed Mobility;Transfers   PT: Bed Mobility Minimal assist;Supine to/from sit   PT: Transfers Supervision/stand-by  assist;Bed to/from chair;Assistive device   Therapeutic Activity   Therapeutic Activities: dynamic activities to improve functional performance Minutes (61046) 40   Symptoms Noted During/After Treatment Fatigue   Treatment Detail/Skilled Intervention Greeted pt supine in bed, agreed to PT. VSS on RA throughout session. Extra time required for room set-up to emulate home and getting equipment. Pt voicing feeling significantly weaker than baseline. Total assist to don shoes in supine, which is baseline. Pt performed supine>sit w/ mod A x 1, needing assist to safely lift BLEs off of bed and trunk control. Pt unsteady in sitting, presenting with poor trunk control, needing assist from rehab aide to maintain midline sitting balance. When assist is removed, pt begins to have LOB, unable to self correct. Additional assist required to scoot to EOB as pt unable to complete despite attempting. PT placed slideboard under pt to attempt transfer. Pt attempted slideboard transfer w/ max A x 2, pt reporting she is unable to complete due to weakness. Pt asking to cease session. Pt returned to supine w/ max A x 2, needing assist to safely lift BLEs back into bed and trunk control. Total assist to boost up in bed. Discussed discharge recommendations with pt, pt reporting she does not feel like she can manage at home or get into car safely. Pt willing to consider TCU. Pt ended session supine in bed, with all needs met and call light within reach.   PT Discharge Planning   PT Plan Bed mobility; sitting balance; slideboard transfer to power    PT Discharge Recommendation (DC Rec) Transitional Care Facility   PT Rationale for DC Rec Pt is below baseline. Pt currently requiring heavy assist with all functional mobility. Pt presents with deficits in activity tolerance, balance, and strength. Due to these deficits, pt currently unable to complete slideboard transfer which she will have to do at home. Pt would benefit from continued  skilled PT services via TCU to address deficits and improve IND with safety and functional mobility.   PT Brief overview of current status Goals of therapy will be to address safe mobility and make recs for d/c to next level of care. Pt and RN will continue to follow all falls risk precautions as documented by RN staff while hospitalized. Recommend lift for transfers   PT Total Distance Amb During Session (feet) 0   Physical Therapy Time and Intention   Timed Code Treatment Minutes 40   Total Session Time (sum of timed and untimed services) 50

## 2025-03-27 NOTE — PLAN OF CARE
Admitted to unit from ED at 0230  Shift note [id number]    Orientation: A&Ox4, occasionally forgetful   Aggression Stop Light: Green  Activity: A2 T/R. Not oob this shift. Pt can help with just one person for turns and repos.   Diet/BS Checks: Reg  IV Access/Drains: L PIV SL with int abx   Pain Management: Denies pain this shift  Abnormal VS/Results: VSS on RA   Bowel/Bladder: Incontinent of B/B- hx of neurogenic bladder secondary to MS. Incontinence cares provided.   Skin/Wounds: Dry BLE. Old healed scar on sacrum from reported old wound. Scattered bruises. Blanchable redness to sacrum/ jana. Otherwise intact. Wears compression stockings.   Consults: Neurology, PT  D/C Disposition: Pending     Other Info:   -BC and UC pending  -Respiratory swabs negative  -UA (+) UTI  -MRI of the brain showed no acute changes

## 2025-03-28 ENCOUNTER — APPOINTMENT (OUTPATIENT)
Dept: PHYSICAL THERAPY | Facility: CLINIC | Age: 70
DRG: 758 | End: 2025-03-28
Payer: MEDICARE

## 2025-03-28 VITALS
RESPIRATION RATE: 18 BRPM | WEIGHT: 149.25 LBS | SYSTOLIC BLOOD PRESSURE: 100 MMHG | TEMPERATURE: 98.5 F | OXYGEN SATURATION: 98 % | BODY MASS INDEX: 22.62 KG/M2 | HEIGHT: 68 IN | DIASTOLIC BLOOD PRESSURE: 51 MMHG | HEART RATE: 90 BPM

## 2025-03-28 PROCEDURE — 120N000001 HC R&B MED SURG/OB

## 2025-03-28 PROCEDURE — 99207 PR NO BILLABLE SERVICE THIS VISIT: CPT | Performed by: HOSPITALIST

## 2025-03-28 PROCEDURE — 250N000013 HC RX MED GY IP 250 OP 250 PS 637: Performed by: PHYSICIAN ASSISTANT

## 2025-03-28 PROCEDURE — 250N000013 HC RX MED GY IP 250 OP 250 PS 637: Performed by: HOSPITALIST

## 2025-03-28 PROCEDURE — 99232 SBSQ HOSP IP/OBS MODERATE 35: CPT | Performed by: PHYSICIAN ASSISTANT

## 2025-03-28 PROCEDURE — 97530 THERAPEUTIC ACTIVITIES: CPT | Mod: GP

## 2025-03-28 RX ORDER — AMOXICILLIN 500 MG/1
500 CAPSULE ORAL EVERY 8 HOURS
Qty: 8 CAPSULE | DISCHARGE
Start: 2025-03-29

## 2025-03-28 RX ORDER — NITROFURANTOIN 25; 75 MG/1; MG/1
100 CAPSULE ORAL DAILY
DISCHARGE
Start: 2025-03-28

## 2025-03-28 RX ORDER — GABAPENTIN 300 MG/1
600 CAPSULE ORAL EVERY MORNING
DISCHARGE
Start: 2025-03-28

## 2025-03-28 RX ORDER — GABAPENTIN 300 MG/1
300 CAPSULE ORAL DAILY PRN
Status: DISCONTINUED | OUTPATIENT
Start: 2025-03-28 | End: 2025-03-29 | Stop reason: HOSPADM

## 2025-03-28 RX ORDER — GABAPENTIN 300 MG/1
900 CAPSULE ORAL AT BEDTIME
Status: DISCONTINUED | OUTPATIENT
Start: 2025-03-28 | End: 2025-03-29 | Stop reason: HOSPADM

## 2025-03-28 RX ORDER — GABAPENTIN 600 MG/1
600 TABLET ORAL DAILY
Status: DISCONTINUED | OUTPATIENT
Start: 2025-03-29 | End: 2025-03-29 | Stop reason: HOSPADM

## 2025-03-28 RX ADMIN — ACYCLOVIR 400 MG: 400 TABLET ORAL at 23:47

## 2025-03-28 RX ADMIN — ACYCLOVIR 400 MG: 400 TABLET ORAL at 12:47

## 2025-03-28 RX ADMIN — GABAPENTIN 900 MG: 300 CAPSULE ORAL at 22:30

## 2025-03-28 RX ADMIN — LATANOPROST 1 DROP: 50 SOLUTION OPHTHALMIC at 08:27

## 2025-03-28 RX ADMIN — BACLOFEN 20 MG: 10 TABLET ORAL at 08:27

## 2025-03-28 RX ADMIN — AMLODIPINE BESYLATE 5 MG: 5 TABLET ORAL at 08:26

## 2025-03-28 RX ADMIN — AMOXICILLIN 500 MG: 500 CAPSULE ORAL at 08:26

## 2025-03-28 RX ADMIN — AMOXICILLIN 500 MG: 500 CAPSULE ORAL at 22:30

## 2025-03-28 RX ADMIN — FAMOTIDINE 40 MG: 20 TABLET, FILM COATED ORAL at 22:30

## 2025-03-28 RX ADMIN — BACLOFEN 30 MG: 10 TABLET ORAL at 20:45

## 2025-03-28 RX ADMIN — LEVOTHYROXINE SODIUM 112 MCG: 112 TABLET ORAL at 08:27

## 2025-03-28 RX ADMIN — DULOXETINE HYDROCHLORIDE 80 MG: 60 CAPSULE, DELAYED RELEASE ORAL at 08:26

## 2025-03-28 RX ADMIN — POLYETHYLENE GLYCOL 3350 17 G: 17 POWDER, FOR SOLUTION ORAL at 08:27

## 2025-03-28 RX ADMIN — GABAPENTIN 900 MG: 300 CAPSULE ORAL at 08:26

## 2025-03-28 RX ADMIN — ARMODAFINIL 200 MG: 50 TABLET ORAL at 09:55

## 2025-03-28 RX ADMIN — AMOXICILLIN 500 MG: 500 CAPSULE ORAL at 15:42

## 2025-03-28 RX ADMIN — GABAPENTIN 300 MG: 300 CAPSULE ORAL at 15:42

## 2025-03-28 RX ADMIN — LOSARTAN POTASSIUM 50 MG: 50 TABLET, FILM COATED ORAL at 08:27

## 2025-03-28 RX ADMIN — ACYCLOVIR 400 MG: 400 TABLET ORAL at 00:19

## 2025-03-28 RX ADMIN — LOSARTAN POTASSIUM 50 MG: 50 TABLET, FILM COATED ORAL at 20:45

## 2025-03-28 ASSESSMENT — ACTIVITIES OF DAILY LIVING (ADL)
ADLS_ACUITY_SCORE: 86
ADLS_ACUITY_SCORE: 86
ADLS_ACUITY_SCORE: 88
ADLS_ACUITY_SCORE: 86
ADLS_ACUITY_SCORE: 87
ADLS_ACUITY_SCORE: 88
ADLS_ACUITY_SCORE: 86
ADLS_ACUITY_SCORE: 85
ADLS_ACUITY_SCORE: 86
ADLS_ACUITY_SCORE: 88
ADLS_ACUITY_SCORE: 85
ADLS_ACUITY_SCORE: 86
ADLS_ACUITY_SCORE: 86
ADLS_ACUITY_SCORE: 88
ADLS_ACUITY_SCORE: 87

## 2025-03-28 NOTE — PLAN OF CARE
Orientation: AOx4, forgetful at times  Aggression Stop Light: Green  Activity: Up A2 and lift, T/R q 2 hrs.  Diet/BS Checks: Reg diet.  Tele: N/A  IV Access/Drains: PIV SL  Pain Management: Nerve pain managed with scheduled elizabeth, Baclofen, PRN Tyl available  Abnormal VS/Results: VSS on RA   Bowel/Bladder: Incontinent of B/B  Skin/Wounds: Healed sacral scar. Blanchable redness to sacrum, barrier cream applied. Scattered bruising throughout. +1-2 BLE edema noted.  Consults: PT and SW following. Neuro signed off.  D/C Disposition: PT recommending discharge to TCU, SW working on sending out referrals.

## 2025-03-28 NOTE — RESULT ENCOUNTER NOTE
Patient currently in hospital - will defer treatment to hospital team. Dr. Carolina Reyna MD / [facility]

## 2025-03-28 NOTE — PROGRESS NOTES
Care Management Follow Up    Length of Stay (days): 1    Expected Discharge Date: 03/29/2025     Concerns to be Addressed: discharge planning     Patient plan of care discussed at interdisciplinary rounds: Yes    Anticipated Discharge Disposition: Home, Outpatient Rehab (PT, OT, SLP, Cardiac or Pulmonary)     Anticipated Discharge Services: None  Anticipated Discharge DME: None    Patient/family educated on Medicare website which has current facility and service quality ratings:    Education Provided on the Discharge Plan: Yes  Patient/Family in Agreement with the Plan: yes    Referrals Placed by CM/SW: Internal Clinic Care Coordination  Private pay costs discussed: Not applicable    Discussed  Partnership in Safe Discharge Planning  document with patient/family: No     Handoff Completed: No, handoff not indicated or clinically appropriate    Additional Information:  Writer spoke with Juanita from [facility]. She is wondering if the Briumvi infusions can either be held while in TCU or it completed before she leaves the hospital and then would not be able to get another infusion until she leaves the TCU. Writer messaged MD. Per Juanita at [facility] if we can get those answers, we can look at admission on Monday. This infusion is expensive and will be a barrier for any facility.     Addendum 1500: Writer talked with MD. Infusions are only every 5-6 months and they will not be needed while in TCU. They will be held. Writer called Juanita with [facility]. They can accept her tomorrow. Juanita states they can accept anytime between [id number]. Writer talked with patient and spouse Manuel. Patient would like to set up a [employer] [facility] Ride for discharge. She is calling and scheduling a 1300  from [facility] [address] for tomorrow 3/29. Patient will need to be dressed and ready at [facility] [address] in her wheelchair by 1300. Writer updated Juanita at [facility]. Weekend Admissions can be reached at  [phone]. Orders can be sent through the in basket. JUSTIN updated MD on discharge for tomorrow. PAS completed.    PAS-RR    D: Per DHS regulation, JUSTIN completed and submitted PAS-RR to [place] [employer] Direct Connect via the Senior LinkAge Line.  PAS-RR confirmation # is : [id number]      P: Further questions may be directed to [facility] LinkAge Line at #[phone], option #4 for PAS-RR staff.      Next Steps: Discharge tomorrow to [facility] TCU     STEPHANY Hansen

## 2025-03-28 NOTE — PLAN OF CARE
Date & Time: 3/28/25 [id number]  Orientation/Cognitive Concerns: A/O x4, forgetful  Abnl VS/O2: VSS on RA  Tele: N/A  Pain Management: PRN dose gabapentin added  Abnl Labs/BG: no new  Behavior/Aggression Tool Color: green  Mobility: A/2 lift  Diet: regular  Bowel/Bladder: incontinent of B/B  Skin: blanchable redness to sacrum  Drains/Devices: PIV SL  Test/Procedures: N/A  Anticipated DC date: to [facility] 3/28, ride at 1244 by [place]

## 2025-03-28 NOTE — PROGRESS NOTES
"[facility]  Hospitalist Progress Note    Assessment & Plan   Marylee Woods is a 69 year old female with significant past medical history for multiple sclerosis wheelchair bound, malignant neoplasm of left breast status postlumpectomy in 2022, obstructive sleep apnea, hypertension, anemia, and depression admitted on 3/2/625 with intermittent confusion and weakness.     Patient was recently hospitalized 3/9-3/14 for UTI and acute colitis due to Norovirus. She was treated with IV Ampicillin -> PO Amoxicillin and discharged home.      She was doing well for a few days after discharge but then started to have some intermittent confusion and description of \"fullness\" in the back of her head. She has had intermittent head drop, and dropping objects. Appears spacey at times. She had an outpatient MRI brain without contrast 3/21 which showed non specific white matter lesions, patient states results were faxed to her neurologist but she did not hear back from them. She had a UA done via PCP on 3/20 for dysuria which was contaminated, and started Amoxicillin 3/24 for UTI symptoms. She reports she was having some N/V with those symptoms, which have since resolved along with the dysuria. Denies fevers or chills.     Acute cystitis  *Patient had UA done 3/20 for workup of her intermittent confusion, which was a contaminated sample. She reported new dysuria on 3/24 and was instructed to take Amoxicillin (patient had leftover prescription from previous hospitalization) which she started on 3/25.  *UA on admission had infectious markers and yeast. Urine culture 3/26 was <10k urogenital germaine, but patient had been on abx. Afebrile, no leukocytosis.  - S/p IV Rocephin on admission, changed to PO Amoxicillin on 3/27 to complete 5-7 day course given her urinary symptoms     Multiple sclerosis  Wheelchair-bound  *Follows with Dr. Redman and receives Briumvi infusions as outpatient. At baseline, utilizes " wheelchair and slide board for transfers.  *As noted above, has been having intermittent confusion and lethargy, head drop and dropping objects at times. She had MRI brain w/o contrast on 3/21 which showed non specific white matter lesions. Unclear how much of her presentation is due to possible UTI vs related to MS flare.  - Appreciate general neurology consult 3/26, there is no evidence to support MS flare at this time. Follow up with Dr. Redman as outpatient.  - Continue PTA baclofen and gabapentin      Acute metabolic/infectious encephalopathy, resolved  *No encephalopathy noted this hospitalization. Prior to admission, was drowsy and confused at times with head drop.  - Continue management as outlined above for UTI  - Appreciate general neurology consult   - Evaluate orthostatic blood pressures (negative)   - Follow up with sleep neurologist as outpatient to assess for sleep related etiology of the head drop     Generalized weakness  - PT recommending TCU     Hypertension  - Continue PTA amlodopine and losartan  - IV hydralazine for SBP >180     Depression  - Continue PTA cymbalta     History of malignant neoplasm left breast status postlumpectomy  *Follows with Dr. Martinez oncology     Obstructive sleep apnea  - Continue PTA armodafinil     Hypothyroidism  - Continue PTA levothyroxine      Glaucoma   - Continue PTA latanoprost     Clinically Significant Risk Factors                   # Hypertension: Noted on problem list                # Financial/Environmental Concerns: none           Diet: Regular Diet Adult     DVT Prophylaxis: Pneumatic Compression Devices   Verduzco Catheter: Not present  Lines: None     Cardiac Monitoring: None  Code Status: Full Code      Disposition Plan       Expected Discharge Date: 03/29/2025    Discharge Delays: *Medically Ready for Discharge  Placement - TCU  Destination: home with family;other (comment) (resume outpatient physical therapy)        Entered: Aisha Salguero PA-C  03/28/2025, 1:24 PM        Medically Ready for Discharge: Anticipated Today      The patient's care was discussed with the Attending Physician, Dr. Garcia and Patient.    The patient has been discussed with Dr. Garcia, who agrees with the assessment and plan at this time.    Aisha Salguero PA-C  [facility]  Securely message with the Vocera Web Console (learn more here)        Interval History   Discussed neurology recs from yesterday. She states her orthostatics this morning were negative. Discussed TCU placement, stable for discharge once bed secured.    -Data reviewed today: I reviewed all new labs and imaging results over the last 24 hours. I personally reviewed no images or EKG's today.    Physical Exam   Temp: 98.3  F (36.8  C) Temp src: Oral BP: 100/51 Pulse: 81   Resp: 16 SpO2: 96 % O2 Device: None (Room air)    Vitals:    03/27/25 0230   Weight: 67.7 kg (149 lb 4 oz)     Vital Signs with Ranges  Temp:  [98.3  F (36.8  C)-98.8  F (37.1  C)] 98.3  F (36.8  C)  Pulse:  [] 81  Resp:  [16-18] 16  BP: (100-125)/(51-86) 100/51  SpO2:  [96 %-100 %] 96 %  I/O last 3 completed shifts:  In: 500 [P.O.:500]  Out: -       Constitutional: Awake, alert, cooperative, no apparent distress.    ENT: Normocephalic, without obvious abnormality, atraumatic. Normal sclera.    Neck: Supple, symmetrical, trachea midline  Pulmonary: No increased work of breathing  Skin: Visualized skin appeared clear.  Neuro: Oriented x 3. Moves all extremities spontaneously. No focal neuro deficits.  Psych:  Normal affect and mood  Extremities: No gross deformities noted.       Medical Decision Making       35 MINUTES SPENT BY ME on the date of service doing chart review, history, exam, documentation & further activities per the note.         Medications   Current Facility-Administered Medications   Medication Dose Route Frequency Provider Last Rate Last Admin     Current Facility-Administered Medications    Medication Dose Route Frequency Provider Last Rate Last Admin    acyclovir (ZOVIRAX) tablet 400 mg  400 mg Oral Q12H Ivanna Rojas MD   400 mg at 03/28/25 1247    amLODIPine (NORVASC) tablet 5 mg  5 mg Oral Daily Ivanna Rojas MD   5 mg at 03/28/25 0826    amoxicillin (AMOXIL) capsule 500 mg  500 mg Oral Q8H Aisha Scruggs PA-C   500 mg at 03/28/25 0826    armodafinil (NUVIGIL) tablet TABS 200 mg  200 mg Oral QAM Ivanna Rojas MD   200 mg at 03/28/25 0955    baclofen (LIORESAL) tablet 20 mg  20 mg Oral Daily Ivanna Rojas MD   20 mg at 03/28/25 0827    baclofen (LIORESAL) tablet 30 mg  30 mg Oral QPM Ivanna Rojas MD   30 mg at 03/27/25 2118    DULoxetine (CYMBALTA) DR capsule 80 mg  80 mg Oral Daily Ivanna Rojas MD   80 mg at 03/28/25 0826    famotidine (PEPCID) tablet 40 mg  40 mg Oral At Bedtime Ivanna Rojas MD   40 mg at 03/27/25 2119    gabapentin (NEURONTIN) capsule 900 mg  900 mg Oral BID Ivanna Rojas MD   900 mg at 03/28/25 0826    latanoprost (XALATAN) 0.005 % ophthalmic solution 1 drop  1 drop Both Eyes Daily Ivanna Rojas MD   1 drop at 03/28/25 0827    levothyroxine (SYNTHROID/LEVOTHROID) tablet 112 mcg  112 mcg Oral Daily Ivanna Rojas MD   112 mcg at 03/28/25 0827    losartan (COZAAR) tablet 50 mg  50 mg Oral BID Ivanna Rojas MD   50 mg at 03/28/25 0827    polyethylene glycol (MIRALAX) Packet 17 g  17 g Oral Daily Ivanna Rojas MD   17 g at 03/28/25 0827    senna-docusate (SENOKOT-S/PERICOLACE) 8.6-50 MG per tablet 1 tablet  1 tablet Oral BID Ivanna Rojas MD        Or    senna-docusate (SENOKOT-S/PERICOLACE) 8.6-50 MG per tablet 2 tablet  2 tablet Oral BID Ivanna Rojas MD           Data   Recent Labs   Lab 03/26/25 1911   WBC 5.3   HGB 11.0*   MCV 90         POTASSIUM 4.9   CHLORIDE 102   CO2 28   BUN 17.4   CR 0.90   ANIONGAP 10   JORDON 9.8   *   ALBUMIN  3.9   PROTTOTAL 7.9   BILITOTAL 0.2   ALKPHOS 127   ALT 10   AST 19       No results found for this or any previous visit (from the past 24 hours).

## 2025-03-29 VITALS
BODY MASS INDEX: 22.62 KG/M2 | RESPIRATION RATE: 16 BRPM | OXYGEN SATURATION: 98 % | SYSTOLIC BLOOD PRESSURE: 129 MMHG | HEIGHT: 68 IN | DIASTOLIC BLOOD PRESSURE: 63 MMHG | WEIGHT: 149.25 LBS | TEMPERATURE: 98.7 F | HEART RATE: 89 BPM

## 2025-03-29 PROCEDURE — 99239 HOSP IP/OBS DSCHRG MGMT >30: CPT | Performed by: HOSPITALIST

## 2025-03-29 PROCEDURE — 250N000013 HC RX MED GY IP 250 OP 250 PS 637: Performed by: HOSPITALIST

## 2025-03-29 PROCEDURE — 250N000013 HC RX MED GY IP 250 OP 250 PS 637: Performed by: PHYSICIAN ASSISTANT

## 2025-03-29 RX ADMIN — LATANOPROST 1 DROP: 50 SOLUTION OPHTHALMIC at 09:10

## 2025-03-29 RX ADMIN — DULOXETINE HYDROCHLORIDE 80 MG: 60 CAPSULE, DELAYED RELEASE ORAL at 09:04

## 2025-03-29 RX ADMIN — BACLOFEN 20 MG: 10 TABLET ORAL at 09:04

## 2025-03-29 RX ADMIN — ACYCLOVIR 400 MG: 400 TABLET ORAL at 11:44

## 2025-03-29 RX ADMIN — POLYETHYLENE GLYCOL 3350 17 G: 17 POWDER, FOR SOLUTION ORAL at 09:03

## 2025-03-29 RX ADMIN — AMOXICILLIN 500 MG: 500 CAPSULE ORAL at 06:46

## 2025-03-29 RX ADMIN — AMLODIPINE BESYLATE 5 MG: 5 TABLET ORAL at 09:04

## 2025-03-29 RX ADMIN — GABAPENTIN 600 MG: 600 TABLET, FILM COATED ORAL at 09:04

## 2025-03-29 RX ADMIN — LOSARTAN POTASSIUM 50 MG: 50 TABLET, FILM COATED ORAL at 09:04

## 2025-03-29 RX ADMIN — ARMODAFINIL 200 MG: 50 TABLET ORAL at 09:03

## 2025-03-29 RX ADMIN — LEVOTHYROXINE SODIUM 112 MCG: 112 TABLET ORAL at 09:04

## 2025-03-29 ASSESSMENT — ACTIVITIES OF DAILY LIVING (ADL)
ADLS_ACUITY_SCORE: 87

## 2025-03-29 NOTE — PLAN OF CARE
Goal Outcome Evaluation:  Orientation: A/O x4  Aggression Stop Light: Green  Activity: A2 lift; repos q2   Diet/BS Checks: Reg   Tele: N/A  IV Access/Drains: PIV SL  Pain Management: BLE neuron pain managed with scheduled medications and repositioning of extremities   Abnormal VS/Results: VSS RA   Bowel/Bladder: Incont B/B; PW in place; no BM  Skin/Wounds: Intact   Consults: PT, SW     D/C Disposition: Today; needs to be at door 2 by 12:45   Other Info:

## 2025-03-29 NOTE — PROGRESS NOTES
Care Management Discharge Note    Discharge Date: 03/29/2025       Discharge Disposition: Skilled Nursing Facility    Discharge Services: None    Discharge DME: None    Discharge Transportation: family or friend will provide    Private pay costs discussed: Not applicable    Does the patient's insurance plan have a 3 day qualifying hospital stay waiver?  No    PAS Confirmation Code: [id number]  Patient/family educated on Medicare website which has current facility and service quality ratings: yes    Education Provided on the Discharge Plan: Yes  Persons Notified of Discharge Plans: huc, bedside Rn, charge Rn, pt   Patient/Family in Agreement with the Plan: yes    Handoff Referral Completed: No, handoff not indicated or clinically appropriate    Additional Information:  Writer able to see that doctor has signed discharge orders. Writer faxed discharge orders to [facility].  Writer spoke with bedside RN and notified her of discharge time for 1pm. Bedside Rn is aware to bring pt down to door 2 as previously set up transport by patient yesterday.    Writer placed phone call to [facility] at 853am. No answer. Writer left voicemail informing them that pt is set to discharge at 1pm and writer has faxed discharge orders. Writer requesting a call back.  Writer updated charge Rn and sean of transport/ discharge time.    Addendum: Writer spoke with Nicki with [facility] who confirms that they are aware of pt discharge at 1pm to their facility. Writer informed her [facility] writer had faxed discharge orders already. Nicki states understanding. She states no questions or concerns at this time.     Ashley Yarbrough, BSW  Social Work  [facility]

## 2025-03-29 NOTE — PROGRESS NOTES
Patient discharged at 12:34 PM to Discharged to rehabilitation facility  IV was discontinued. Pain at time of discharge was 0/10. Belongings returned to patient.  Discharge instructions and medications reviewed with patient.  Patient verbalized understanding and all questions were answered.  Prescriptions given to patient.  At time of discharge, patient condition was stable and left the unit via wheelchair escorted by spouse and [employer] transport.

## 2025-03-29 NOTE — PLAN OF CARE
Orientation: A&Ox4, forgetful   Aggression Stop Light: Green  Activity: A2 lift, T/R   Diet/BS Checks: Regular diet  Tele:  N/a  IV Access/Drains: PIV SL  Pain Management: Denies pain, has scheduled meds   Abnormal VS/Results: VSS on RA  Bowel/Bladder: Incontinent B/B - purewick in place  Skin/Wounds: Scattered bruising, redness to sacrum  Consults: SW  D/C Disposition: Plan to discharge to TCU today at 1300.   Other Info:    -Pt to go to TCU via [employer] bus with personal wheelchair.

## 2025-03-29 NOTE — DISCHARGE SUMMARY
"[facility]    Discharge Summary  Hospitalist    Date of Admission:  3/26/2025  Date of Discharge:  3/29/2025    Discharge Diagnoses      Urinary tract infection without hematuria, site unspecified  Confusion  MS (multiple sclerosis) (H)  Acute cystitis without hematuria    History of Present Illness   Marylee Woods is an 69 year old female who presented with multiple sclerosis with progressive weakness and infectious encephalopathy    Hospital Course   Marylee Woods was admitted on 3/26/2025.  The following problems were addressed during her hospitalization:    Marylee Woods is a 69 year old female with significant past medical history for multiple sclerosis wheelchair bound, malignant neoplasm of left breast status postlumpectomy in 2022, obstructive sleep apnea, hypertension, anemia, and depression admitted on 3/2/625 with intermittent confusion and weakness.     Patient was recently hospitalized 3/9-3/14 for UTI and acute colitis due to Norovirus. She was treated with IV Ampicillin -> PO Amoxicillin and discharged home.      She was doing well for a few days after discharge but then started to have some intermittent confusion and description of \"fullness\" in the back of her head. She has had intermittent head drop, and dropping objects. Appears spacey at times. She had an outpatient MRI brain without contrast 3/21 which showed non specific white matter lesions, patient states results were faxed to her neurologist but she did not hear back from them. She had a UA done via PCP on 3/20 for dysuria which was contaminated, and started Amoxicillin 3/24 for UTI symptoms. She reports she was having some N/V with those symptoms, which have since resolved along with the dysuria. Denies fevers or chills.     Acute cystitis  *Patient had UA done 3/20 for workup of her intermittent confusion, which was a contaminated sample. She reported new dysuria on 3/24 and was instructed to take Amoxicillin " (patient had leftover prescription from previous hospitalization) which she started on 3/25.  *UA on admission had infectious markers and yeast. Urine culture 3/26 was <10k urogenital germaine, but patient had been on abx. Afebrile, no leukocytosis.  - S/p IV Rocephin on admission, changed to PO Amoxicillin on 3/27 to complete 5-7 day course given her urinary symptoms     Multiple sclerosis  Wheelchair-bound  *Follows with Dr. Redman and receives Briumvi infusions as outpatient. At baseline, utilizes wheelchair and slide board for transfers.  *As noted above, has been having intermittent confusion and lethargy, head drop and dropping objects at times. She had MRI brain w/o contrast on 3/21 which showed non specific white matter lesions. Unclear how much of her presentation is due to possible UTI vs related to MS flare.  - Appreciate general neurology consult 3/26, there is no evidence to support MS flare at this time. Follow up with Dr. Redman as outpatient.  - Continue PTA baclofen and gabapentin      Acute metabolic/infectious encephalopathy, resolved  *No encephalopathy noted this hospitalization. Prior to admission, was drowsy and confused at times with head drop.  - Continue management as outlined above for UTI  - Appreciate general neurology consult   - Evaluate orthostatic blood pressures (negative)   - Follow up with sleep neurologist as outpatient to assess for sleep related etiology of the head drop     Generalized weakness  - PT recommending TCU     Hypertension  - Continue PTA amlodopine and losartan  - IV hydralazine for SBP >180     Depression  - Continue PTA cymbalta     History of malignant neoplasm left breast status postlumpectomy  *Follows with Dr. Martinez oncology     Obstructive sleep apnea  - Continue PTA armodafinil     Hypothyroidism  - Continue PTA levothyroxine      Glaucoma   - Continue PTA latanoprost      Clinically Significant Risk Factors                   # Hypertension: Noted on  problem list                # Financial/Environmental Concerns: none          Lanie Garcia MD, MD    Pending Results   These results will be followed up by PCP  Unresulted Labs Ordered in the Past 30 Days of this Admission       Date and Time Order Name Status Description    3/26/2025  6:54 PM Blood Culture Peripheral Blood Preliminary     3/26/2025  6:54 PM Blood Culture Peripheral Blood Preliminary           Code Status   Full Code       Primary Care Physician   Carolina Pulliam    Physical Exam                      Vitals:    03/27/25 0230   Weight: 67.7 kg (149 lb 4 oz)     Vital Signs with Ranges     I/O last 3 completed shifts:  In: 1080 [P.O.:1080]  Out: -     Physical Exam  Constitutional:       Appearance: She is ill-appearing.   Cardiovascular:      Rate and Rhythm: Normal rate and regular rhythm.      Pulses: Normal pulses.      Heart sounds: Normal heart sounds.   Pulmonary:      Effort: Pulmonary effort is normal. No respiratory distress.      Breath sounds: Normal breath sounds.   Abdominal:      General: Abdomen is flat. Bowel sounds are normal. There is no distension.      Tenderness: There is no abdominal tenderness. There is no guarding.   Skin:     General: Skin is warm and dry.   Neurological:      General: No focal deficit present.      Comments: Chronic MS associated changes.  Wheelchair-bound.           Discharge Disposition   Discharged to short-term care facility  Condition at discharge: Stable    Consultations This Hospital Stay   PHYSICAL THERAPY ADULT IP CONSULT  NEUROLOGY IP CONSULT  CARE MANAGEMENT / SOCIAL WORK IP CONSULT  CARE MANAGEMENT / SOCIAL WORK IP CONSULT  CARE MANAGEMENT / SOCIAL WORK IP CONSULT  PHYSICAL THERAPY ADULT IP CONSULT  OCCUPATIONAL THERAPY ADULT IP CONSULT    Time Spent on this Encounter   Lanie COLON MD, personally saw the patient today and spent greater than 30 minutes discharging this patient.    Discharge Orders      Primary Care - Care Coordination  Referral      General info for SNF    Length of Stay Estimate: Short Term Care: Estimated # of Days <30  Condition at Discharge: Stable  Level of care:skilled   Rehabilitation Potential: Good  Admission H&P remains valid and up-to-date: Yes  Recent Chemotherapy: N/A  Use Nursing Home Standing Orders: Yes     Mantoux instructions    Give two-step Mantoux (PPD) Per Facility Policy Yes     Follow Up and recommended labs and tests    Follow up with Nursing home physician.  No follow up labs or test are needed.  Follow up with your neurologist as planned     Reason for your hospital stay    You were hospitalized with generalized weakness and UTI     Activity - Up with nursing assistance     Additional Discharge Instructions    Plan to hold Briumvi infusions while at TCU (she is not due for another 5 or 6 months, regardless)     Full Code     Physical Therapy Adult Consult    Evaluate and treat as clinically indicated.    Reason:  MS, deconditioning     Occupational Therapy Adult Consult    Evaluate and treat as clinically indicated.    Reason:  MS, deconditioning     Diet    Follow this diet upon discharge: Current Diet:Orders Placed This Encounter      Regular Diet Adult     Discharge Medications   Discharge Medication List as of 3/29/2025 11:29 AM        START taking these medications    Details   amoxicillin (AMOXIL) 500 MG capsule Take 1 capsule (500 mg) by mouth every 8 hours., Disp-8 capsule, Transitional           CONTINUE these medications which have CHANGED    Details   !! gabapentin (NEURONTIN) 300 MG capsule Take 2 capsules (600 mg) by mouth every morning., Transitional      nitroFURantoin macrocrystal-monohydrate (MACROBID) 100 MG capsule Take 1 capsule (100 mg) by mouth daily., TransitionalHold until done with Amoxicillin course       !! - Potential duplicate medications found. Please discuss with provider.        CONTINUE these medications which have NOT CHANGED    Details   acetaminophen (TYLENOL) 500 MG  tablet Take 1-2 tablets (500-1,000 mg) by mouth every 8 hours as needed for mild pain or fever (greater than 101 degrees), No Print Out      acyclovir (ZOVIRAX) 400 MG tablet TAKE 1 TABLET BY MOUTH EVERY 12 HOURS, Disp-180 tablet, R-2, E-Prescribe      amLODIPine (NORVASC) 5 MG tablet Take 1 tablet by mouth once daily, Disp-90 tablet, R-2, E-Prescribe      Armodafinil 200 MG TABS Take 200 mg by mouth every morning, Historical      baclofen (LIORESAL) 10 MG tablet Take 2 tablets (20 mg) in the morning and 3 tablets (30 mg) in the evening., Historical      Calcium Carbonate Antacid 600 MG CHEW Take 600 mg by mouth daily, Historical      CRANBERRY PO Take by mouth. Take 1 capsule once daily., Historical      !! DULoxetine (CYMBALTA) 20 MG capsule TAKE 1 CAPSULE BY MOUTH ONCE DAILY ALONG  WITH  A  60  MG  TABLET  FOR  A  TOTAL  DOSE  OF  80  MG  DAILY, Disp-90 capsule, R-4, E-Prescribe      !! DULoxetine (CYMBALTA) 60 MG capsule Take 1 capsule by mouth once daily, Disp-90 capsule, R-4, E-Prescribe      estradiol (ESTRACE) 0.1 MG/GM vaginal cream Place 2 g vaginally twice a week.Disp-42.5 g, [id number]      famotidine (PEPCID) 40 MG tablet TAKE 1 TABLET BY MOUTH AT BEDTIME, Disp-90 tablet, R-2, E-Prescribe      !! gabapentin (NEURONTIN) 300 MG capsule Take 900 mg by mouth at bedtime., Historical      latanoprost (XALATAN) 0.005 % ophthalmic solution INSTILL 1 DROP INTO BOTH EYES EVERY DAY AS DIRECTED PT WILL NEED TO BE SEEN FOR FUTURE REFILLS, Historical      levothyroxine (SYNTHROID/LEVOTHROID) 112 MCG tablet Take 1 tablet (112 mcg) by mouth daily, Disp-90 tablet, R-3, E-Prescribe      losartan (COZAAR) 50 MG tablet Take 1 tablet (50 mg) by mouth 2 times daily, Disp-180 tablet, R-4, E-Prescribe      magnesium oxide (MAG-OX) 400 MG tablet Take 400 mg by mouth daily., Historical      Multiple Vitamins-Minerals (MULTIPLE VITAMINS/WOMENS PO) Take by mouth daily, Historical      ondansetron (ZOFRAN ODT) 4 MG ODT tab Take  1 tablet (4 mg) by mouth every 6 hours as needed for nausea or vomiting., Disp-12 tablet, R-0, E-Prescribe       !! - Potential duplicate medications found. Please discuss with provider.        Allergies   Allergies   Allergen Reactions    Bactrim [Sulfamethoxazole-Trimethoprim] Hallucination    Hydrochlorothiazide      Hyponatremia    Sulfamethizole     Amantadine      hallucinations    Budesonide     Budesonide-Formoterol Fumarate Rash     Data   Recent Labs   Lab Test 03/26/25  1911 03/20/25  1303 03/14/25  0730 11/19/23  0609 11/19/23  0608 11/18/23  0532 11/17/23  1215 11/01/18  0556 10/31/18  1859   WBC 5.3 6.1 6.3   < >  --    < >  --    < > 7.1   HGB 11.0* 10.2* 9.8*   < >  --    < >  --    < > 9.9*   MCV 90 92 86   < >  --    < >  --    < > 95    252 247   < >  --    < >  --    < > 124*   INR  --   --   --   --  1.17*  --  1.26*  --  1.03    < > = values in this interval not displayed.      Recent Labs   Lab Test 03/26/25  1911 03/20/25  1303 03/14/25  0730    136 138   POTASSIUM 4.9 5.2 3.9   CHLORIDE 102 100 104   CO2 28 23 26   BUN 17.4 18.0 5.1*   CR 0.90 1.22* 0.70   ANIONGAP 10 13 8   JORDON 9.8 9.9 9.1   * 97 100*         Results for orders placed or performed during the hospital encounter of 03/21/25   MR Brain w/o Contrast    Narrative    EXAM: MR BRAIN W/O CONTRAST  LOCATION: [facility]  DATE: 3/21/2025    INDICATION: History of MS, episode of altered state earlier this week. No new deficits.   COMPARISON: None available at the time of this dictation.  TECHNIQUE: Routine multiplanar multisequence head MRI without intravenous contrast. Per the technologist, IV access could not be obtained so exam was done without contrast.    FINDINGS:  INTRACRANIAL CONTENTS: No restricted diffusion to suggest acute infarct. No mass effect or midline shift. No acute intracranial hemorrhage. Ventricular size is within normal limits without evidence of hydrocephalus. Mild  diffuse parenchymal volume loss.   Patchy deep and subcortical white matter T2 hyperintensities. No abnormal extra-axial fluid collection.    SELLA: No abnormality accounting for technique.    OSSEOUS STRUCTURES/SOFT TISSUES: Normal marrow signal. The major intracranial vascular flow voids are maintained.     ORBITS: No abnormality accounting for technique.     SINUSES/MASTOIDS: No paranasal sinus mucosal disease. Large right mastoid effusion.       Impression    IMPRESSION:  1.  No acute infarct, mass, hemorrhage, or herniation.  2.  Nonspecific white matter lesions which could be due to chronic microvascular ischemic disease and/or demyelination given the provided history. No prior images available for comparison at the time of this dictation.     *Note: Due to a large number of results and/or encounters for the requested time period, some results have not been displayed. A complete set of results can be found in Results Review.

## 2025-03-30 NOTE — PLAN OF CARE
Physical Therapy Discharge Summary    Reason for therapy discharge:    Discharged to transitional care facility.    Progress towards therapy goal(s). See goals on Care Plan in [facility] electronic health record for goal details.  Goals partially met.  Barriers to achieving goals:   discharge from facility.    Therapy recommendation(s):    Continued therapy is recommended.  Rationale/Recommendations:  To improve IND w/ mobility as pt below baseline at this time.

## 2025-03-31 ENCOUNTER — PATIENT OUTREACH (OUTPATIENT)
Dept: CARE COORDINATION | Facility: CLINIC | Age: 70
End: 2025-03-31

## 2025-03-31 ENCOUNTER — TRANSITIONAL CARE UNIT VISIT (OUTPATIENT)
Dept: GERIATRICS | Facility: CLINIC | Age: 70
End: 2025-03-31
Payer: MEDICARE

## 2025-03-31 ENCOUNTER — DOCUMENTATION ONLY (OUTPATIENT)
Dept: GERIATRICS | Facility: CLINIC | Age: 70
End: 2025-03-31

## 2025-03-31 VITALS
HEART RATE: 77 BPM | SYSTOLIC BLOOD PRESSURE: 120 MMHG | WEIGHT: 149.3 LBS | BODY MASS INDEX: 22.63 KG/M2 | OXYGEN SATURATION: 93 % | RESPIRATION RATE: 18 BRPM | TEMPERATURE: 97.9 F | HEIGHT: 68 IN | DIASTOLIC BLOOD PRESSURE: 67 MMHG

## 2025-03-31 DIAGNOSIS — G35 MULTIPLE SCLEROSIS (H): ICD-10-CM

## 2025-03-31 DIAGNOSIS — I10 ESSENTIAL HYPERTENSION: ICD-10-CM

## 2025-03-31 DIAGNOSIS — C50.412 MALIGNANT NEOPLASM OF UPPER-OUTER QUADRANT OF LEFT BREAST IN FEMALE, ESTROGEN RECEPTOR POSITIVE (H): ICD-10-CM

## 2025-03-31 DIAGNOSIS — K59.01 SLOW TRANSIT CONSTIPATION: ICD-10-CM

## 2025-03-31 DIAGNOSIS — M62.81 GENERALIZED MUSCLE WEAKNESS: ICD-10-CM

## 2025-03-31 DIAGNOSIS — E03.8 OTHER SPECIFIED HYPOTHYROIDISM: ICD-10-CM

## 2025-03-31 DIAGNOSIS — G47.33 OSA (OBSTRUCTIVE SLEEP APNEA): ICD-10-CM

## 2025-03-31 DIAGNOSIS — R30.0 DYSURIA: ICD-10-CM

## 2025-03-31 DIAGNOSIS — Z79.899 POLYPHARMACY: ICD-10-CM

## 2025-03-31 DIAGNOSIS — G82.20 SPASTIC PARAPLEGIA SECONDARY TO MULTIPLE SCLEROSIS (H): ICD-10-CM

## 2025-03-31 DIAGNOSIS — Z78.9 TAKES DIETARY SUPPLEMENTS: ICD-10-CM

## 2025-03-31 DIAGNOSIS — Z17.0 MALIGNANT NEOPLASM OF UPPER-OUTER QUADRANT OF LEFT BREAST IN FEMALE, ESTROGEN RECEPTOR POSITIVE (H): ICD-10-CM

## 2025-03-31 DIAGNOSIS — F32.9 MAJOR DEPRESSIVE DISORDER, REMISSION STATUS UNSPECIFIED, UNSPECIFIED WHETHER RECURRENT: ICD-10-CM

## 2025-03-31 DIAGNOSIS — G35 SPASTIC PARAPLEGIA SECONDARY TO MULTIPLE SCLEROSIS (H): ICD-10-CM

## 2025-03-31 DIAGNOSIS — R41.89 EPISODE OF ALTERED COGNITION: Primary | ICD-10-CM

## 2025-03-31 LAB
BACTERIA BLD CULT: NO GROWTH
BACTERIA BLD CULT: NO GROWTH

## 2025-03-31 PROCEDURE — 99310 SBSQ NF CARE HIGH MDM 45: CPT

## 2025-03-31 NOTE — LETTER
3/31/2025      Marylee Woods  [address]  [facility] [place] [address]        [facility] GERIATRICS    PRIMARY CARE PROVIDER AND CLINIC:  Carolina Pulliam MD, [address] [address] / [place] [address]***  Chief Complaint   Patient presents with    Hospital F/U      [facility] Medical Record Number:  [id number]  Place of Service where encounter took place:  [facility] (SNF) [[id number]]    Marylee Woods  is a 69 year old  (1955), {fgsinitialoption:541275}.   HPI:    ***    CODE STATUS/ADVANCE DIRECTIVES DISCUSSION:  Full Code  {CODE STATUS:854199}  ALLERGIES:   Allergies   Allergen Reactions    Bactrim [Sulfamethoxazole-Trimethoprim] Hallucination    Hydrochlorothiazide      Hyponatremia    Sulfamethizole     Amantadine      hallucinations    Budesonide     Budesonide-Formoterol Fumarate Rash      PAST MEDICAL HISTORY:   Past Medical History:   Diagnosis Date    Acute cystitis without hematuria     Acute pain of right knee     Atypical ductal hyperplasia of left breast 02/2022    Candida esophagitis - 2019 (H)     CKD (chronic kidney disease) stage 3, GFR 30-59 ml/min (H)     Community acquired pneumonia, unspecified laterality     Cough, unspecified type 04/05/2024    Depression     Epigastric pain     Former tobacco use     Fracture of femur, distal, left, closed (H)     Gastro-oesophageal reflux disease     Graves disease     History of fracture of fibula     History of tibial fracture     HSV (herpes simplex virus) infection     Hyperlipidemia with target LDL less than 130     Hyponatremia     L tib fx s/p IM nailing     Lung nodules     Currently managed with follow up imaging by [facility] result Team.       Multiple sclerosis (H)     Osteoporosis     Postablative hypothyroidism       PAST SURGICAL HISTORY:   has a past surgical history that includes hip surgery (2009); c/section, low transverse (1992); Laparoscopic cholecystectomy (6/24/2014); colonoscopy (2007);  Esophagoscopy, gastroscopy, duodenoscopy (EGD), combined (1/26/2017); orthopedic surgery (2009); Open reduction internal fixation rodding intramedullary tibia (4/14/2013); Esophagoscopy, gastroscopy, duodenoscopy (EGD), combined (N/A, 1/26/2017); Colonoscopy (N/A, 1/26/2017); Open reduction internal fixation femur distal (Left, 11/1/2018); Lumpectomy breast (Left, 3/31/2022); and Esophagoscopy, gastroscopy, duodenoscopy (EGD), combined (N/A, 4/20/2023).  FAMILY HISTORY: family history includes Cancer in her maternal grandfather; Heart Disease in her father, maternal grandmother, mother, and paternal grandfather.  SOCIAL HISTORY:   reports that she quit smoking about 3 years ago. Her smoking use included cigarettes. She has been exposed to tobacco smoke. She has never used smokeless tobacco. She reports current alcohol use. She reports that she does not use drugs.  Patient's living condition: {LIVES WITH (NURSING HOME):169834}    Post Discharge Medication Reconciliation Status:   MED REC REQUIRED{TIP  Click the link below to document or use med rec list, use list to pull in response :644496}  Post Medication Reconciliation Status: {MED REC LIST:357763}       Current Outpatient Medications   Medication Sig Dispense Refill    acetaminophen (TYLENOL) 500 MG tablet Take 1-2 tablets (500-1,000 mg) by mouth every 8 hours as needed for mild pain or fever (greater than 101 degrees)      acyclovir (ZOVIRAX) 400 MG tablet TAKE 1 TABLET BY MOUTH EVERY 12 HOURS 180 tablet 2    amLODIPine (NORVASC) 5 MG tablet Take 1 tablet by mouth once daily 90 tablet 2    amoxicillin (AMOXIL) 500 MG capsule Take 1 capsule (500 mg) by mouth every 8 hours. 8 capsule     Armodafinil 200 MG TABS Take 200 mg by mouth every morning      baclofen (LIORESAL) 10 MG tablet Take 2 tablets (20 mg) in the morning and 3 tablets (30 mg) in the evening.      Calcium Carbonate Antacid 600 MG CHEW Take 600 mg by mouth daily      CRANBERRY PO Take by mouth.  Take 1 capsule once daily.      DULoxetine (CYMBALTA) 20 MG capsule TAKE 1 CAPSULE BY MOUTH ONCE DAILY ALONG  WITH  A  60  MG  TABLET  FOR  A  TOTAL  DOSE  OF  80  MG  DAILY 90 capsule 4    DULoxetine (CYMBALTA) 60 MG capsule Take 1 capsule by mouth once daily (Patient taking differently: Take 1 capsule by mouth once daily, along with 20 mg for total of 80 mg daily dose.) 90 capsule 4    estradiol (ESTRACE) 0.1 MG/GM vaginal cream Place 2 g vaginally twice a week. 42.5 g 4    famotidine (PEPCID) 40 MG tablet TAKE 1 TABLET BY MOUTH AT BEDTIME 90 tablet 2    gabapentin (NEURONTIN) 300 MG capsule Take 2 capsules (600 mg) by mouth every morning.      gabapentin (NEURONTIN) 300 MG capsule Take 900 mg by mouth at bedtime.      latanoprost (XALATAN) 0.005 % ophthalmic solution INSTILL 1 DROP INTO BOTH EYES EVERY DAY AS DIRECTED PT WILL NEED TO BE SEEN FOR FUTURE REFILLS      levothyroxine (SYNTHROID/LEVOTHROID) 112 MCG tablet Take 1 tablet (112 mcg) by mouth daily 90 tablet 3    losartan (COZAAR) 50 MG tablet Take 1 tablet (50 mg) by mouth 2 times daily 180 tablet 4    magnesium oxide (MAG-OX) 400 MG tablet Take 400 mg by mouth daily.      Multiple Vitamins-Minerals (MULTIPLE VITAMINS/WOMENS PO) Take by mouth daily      nitroFURantoin macrocrystal-monohydrate (MACROBID) 100 MG capsule Take 1 capsule (100 mg) by mouth daily.      ondansetron (ZOFRAN ODT) 4 MG ODT tab Take 1 tablet (4 mg) by mouth every 6 hours as needed for nausea or vomiting. 12 tablet 0     No current facility-administered medications for this visit.       ROS:  {ROS FGS:394502}    Vitals:  LMP 01/31/2004 (Approximate)   Exam:  {Nursing home physical exam :342602}    Lab/Diagnostic data:  {fgslab:862337}    ASSESSMENT/PLAN:    {FGS DX2:359149}    Orders:  {fgsorders:610334}  ***    Electronically signed by:  Jennifer Varma ***                     Sincerely,        Jovanna Nobles, SWAPNIL CNP    Electronically signed   daily 90 tablet 2     amoxicillin (AMOXIL) 500 MG capsule Take 1 capsule (500 mg) by mouth every 8 hours. 8 capsule      Armodafinil 200 MG TABS Take 200 mg by mouth every morning       baclofen (LIORESAL) 10 MG tablet Take 2 tablets (20 mg) in the morning and 3 tablets (30 mg) in the evening.       Calcium Carbonate Antacid 600 MG CHEW Take 600 mg by mouth daily       CRANBERRY PO Take by mouth. Take 1 capsule once daily.       DULoxetine (CYMBALTA) 20 MG capsule TAKE 1 CAPSULE BY MOUTH ONCE DAILY ALONG  WITH  A  60  MG  TABLET  FOR  A  TOTAL  DOSE  OF  80  MG  DAILY 90 capsule 4     DULoxetine (CYMBALTA) 60 MG capsule Take 1 capsule by mouth once daily (Patient taking differently: Take 1 capsule by mouth once daily, along with 20 mg for total of 80 mg daily dose.) 90 capsule 4     estradiol (ESTRACE) 0.1 MG/GM vaginal cream Place 2 g vaginally twice a week. 42.5 g 4     famotidine (PEPCID) 40 MG tablet TAKE 1 TABLET BY MOUTH AT BEDTIME 90 tablet 2     gabapentin (NEURONTIN) 300 MG capsule Take 2 capsules (600 mg) by mouth every morning.       gabapentin (NEURONTIN) 300 MG capsule Take 900 mg by mouth at bedtime.       latanoprost (XALATAN) 0.005 % ophthalmic solution INSTILL 1 DROP INTO BOTH EYES EVERY DAY AS DIRECTED PT WILL NEED TO BE SEEN FOR FUTURE REFILLS       levothyroxine (SYNTHROID/LEVOTHROID) 112 MCG tablet Take 1 tablet (112 mcg) by mouth daily 90 tablet 3     losartan (COZAAR) 50 MG tablet Take 1 tablet (50 mg) by mouth 2 times daily 180 tablet 4     magnesium oxide (MAG-OX) 400 MG tablet Take 400 mg by mouth daily.       Multiple Vitamins-Minerals (MULTIPLE VITAMINS/WOMENS PO) Take by mouth daily       nitroFURantoin macrocrystal-monohydrate (MACROBID) 100 MG capsule Take 1 capsule (100 mg) by mouth daily.       ondansetron (ZOFRAN ODT) 4 MG ODT tab Take 1 tablet (4 mg) by mouth every 6 hours as needed for nausea or vomiting. 12 tablet 0     No current facility-administered medications for this visit.  "      ROS:  4 point ROS including Respiratory, CV, GI and , other than that noted in the HPI,  is negative    Vitals:  /67   Pulse 77   Temp 97.9  F (36.6  C)   Resp 18   Ht 1.727 m (5' 8\")   Wt 67.7 kg (149 lb 4.8 oz)   LMP 01/31/2004 (Approximate)   SpO2 93%   BMI 22.70 kg/m    Exam:  GENERAL APPEARANCE:  Alert, in no distress  HEENT:  atraumatic, EOM intact, moist mucus membranes  RESP:  non-labored breathing, lungs clear on auscultation, no respiratory distress, no cough  CV:  Rate regular, S1 S2 noted, 1+ BLE edema  ABDOMEN:  soft, non-distended, non-tender, bowel sounds active  M/S:   wheelchair bound, strength and tone equal bilaterally, no calf pain  SKIN:  warm, dry, thin, fragile, no obvious rash, lesions, ulcerations or petechiae   NEURO:   Face is symmetric, examination of sensation by touch normal, follows and tracks, slow speech  PSYCH:  calm, cooperative      Lab/Diagnostic data:  Labs reviewed as per Epic and/or Care Everywhere.      ASSESSMENT/PLAN:       Episode of altered cognition  Reason for hospitalization. Etiology unclear. IP neurology consulted; less concerned of MS flare. Today, no concerns.   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 Redman from [facility]; MRI on 3/21 reportedly stable. Today, patient notes spasm in the afternoon; discussed concern of medication side effects if added another dose of baclofen; consider decreasing morning dose of gabapentin or adjusting current dose of baclofen to accommodate multiple doses through the day. Patient agree to trial change morning baclofen to afternoon time.   -change baclofen 20 mg qAM to afternoon  -continue baclofen 30 mg at bedtime  -continue gabapentin 600 mg every morning and 300 mg at bedtime  -follow-up with neurology as recommended    Essential hypertension  //88 mmHg. 72-79 bpm.   -continue " losartan 50 mg twice daily  -continue amlodipine 5 mg daily    Dysuria  Recent cultures with no bacterial growth despite reports of dysuria. Discharged from hospital with 5 days of amoxicillin, last day 4/1. Today, VSS.  -PTA macrobid 100 mg daily for prophylaxis  -PTA estradiol 2 gram twice weekly  -monitor for worsening signs/symptoms    MARYLU (obstructive sleep apnea)  IP neurology recommended seeing sleep neurologist to evaluate for sleep-related etiology for head drop spells.  -PTA armodafinil 300 mg daily    Major depressive disorder, remission status unspecified, unspecified whether recurrent  -duloxetine 80 mg daily  -monitor mood and behaviors    Malignant neoplasm of upper-outer quadrant of left breast in female, estrogen receptor positive (H)  Previously on Lemtrada; transitioned Briumvi in Feb 2025; next infusion due in 5 months.   -follow-up with oncology    Other specified hypothyroidism  Last TSH 1.31 (3/26/25)  -levothyroxine 112 mcg daily  -trend TSH periodically    Slow transit constipation   -start miralax 17 g daily  -adjust bowel meds as needed    Polypharmacy  Likely contributing to altered cognition.  -reduce polypharmacy as able    Takes dietary supplements   Clarified orders today.  -calcium carbonate 500 mg daily while in TCU vs 600 mg due to no supply per pharmacy  -take cranberry supplement 125 mg daily while in TCU    Generalized muscle weakness  Secondary to diagnoses above and recent hospitalization. In TCU for rehabilitation.   -PT/OT eval and treat - adv per recommendations   -SW available for safe discharge planning ongoing        65 minutes spent on the date of this encounter doing chart review, review labs, discussion with nursing staff, patient visit, and documentation.       Electronically signed by:  Dr. Jovanna Nobles, DNP, APRN, AGNP-BC, PHN    This note was completed with the assistance of dictation software. Typos and word substitution-errors are expected and  unintended.                      Sincerely,        Jovanna Nobles, APRN CNP    Electronically signed

## 2025-03-31 NOTE — PROGRESS NOTES
"[facility] GERIATRICS    PRIMARY CARE PROVIDER AND CLINIC:  Carolina Pulliam MD, [address] / [place] [address]  Chief Complaint   Patient presents with    Hospital F/U      [facility] Medical Record Number:  [id number]  Place of Service where encounter took place:  [facility] [[id number]]    HPI:    Marylee Woods  is a 69 year old  (1955), {fgsinitialoption:546065}.     Today:  Nursing staff reports no acute medical concerns. Amoxicillin ordered until 4/2, then she will restart her daily Macrobid. Pharmacy is unable to fill calcium carbonate 600 mg and cranberry supplement; needs dose adjustment.     Patient is seen today for a visit in her room. She is sitting in her wheelchair while nursing aide rearranges her room.  Feels \"good\". \"No\" pain. Therapies went \"fine\"; recalls working on \"sliding board transfer\" which was successful today.  Appetite is \"good\"; notes dislike to \"some foods\" but endorses adequate intake. Last BM was \"this morning\"; feels some constipation, requested for daily Miralax. \"No\" belly pain. \"No\" dysuria.  Sleeping is difficult at night because \"it's very warm in here\". Mood is \"good\". \"No\" depression or SI. Denies CP, palpitations, lightheadedness, dizziness, fatigue, SOB, fever, chills, nausea/vomiting concerns today.      CODE STATUS/ADVANCE DIRECTIVES DISCUSSION:  Full Code  CPR/Full code   ALLERGIES:   Allergies   Allergen Reactions    Bactrim [Sulfamethoxazole-Trimethoprim] Hallucination    Hydrochlorothiazide      Hyponatremia    Sulfamethizole     Amantadine      hallucinations    Budesonide     Budesonide-Formoterol Fumarate Rash      PAST MEDICAL HISTORY:   Past Medical History:   Diagnosis Date    Acute cystitis without hematuria     Acute pain of right knee     Atypical ductal hyperplasia of left breast 02/2022    Candida esophagitis - 2019 (H)     CKD (chronic kidney disease) stage 3, GFR 30-59 ml/min (H)     Community acquired pneumonia, " unspecified laterality     Cough, unspecified type 04/05/2024    Depression     Epigastric pain     Former tobacco use     Fracture of femur, distal, left, closed (H)     Gastro-oesophageal reflux disease     Graves disease     History of fracture of fibula     History of tibial fracture     HSV (herpes simplex virus) infection     Hyperlipidemia with target LDL less than 130     Hyponatremia     L tib fx s/p IM nailing     Lung nodules     Currently managed with follow up imaging by [facility] result Team.       Multiple sclerosis (H)     Osteoporosis     Postablative hypothyroidism       PAST SURGICAL HISTORY:   has a past surgical history that includes hip surgery (2009); c/section, low transverse (1992); Laparoscopic cholecystectomy (6/24/2014); colonoscopy (2007); Esophagoscopy, gastroscopy, duodenoscopy (EGD), combined (1/26/2017); orthopedic surgery (2009); Open reduction internal fixation rodding intramedullary tibia (4/14/2013); Esophagoscopy, gastroscopy, duodenoscopy (EGD), combined (N/A, 1/26/2017); Colonoscopy (N/A, 1/26/2017); Open reduction internal fixation femur distal (Left, 11/1/2018); Lumpectomy breast (Left, 3/31/2022); and Esophagoscopy, gastroscopy, duodenoscopy (EGD), combined (N/A, 4/20/2023).  FAMILY HISTORY: family history includes Cancer in her maternal grandfather; Heart Disease in her father, maternal grandmother, mother, and paternal grandfather.  SOCIAL HISTORY:   reports that she quit smoking about 3 years ago. Her smoking use included cigarettes. She has been exposed to tobacco smoke. She has never used smokeless tobacco. She reports current alcohol use. She reports that she does not use drugs.  Patient's living condition: {LIVES WITH (NURSING HOME):474750}    Post Discharge Medication Reconciliation Status:   MED REC REQUIRED{TIP  Click the link below to document or use med rec list, use list to pull in response :457021}  Post Medication Reconciliation Status: {MED  REC LIST:389505}       Current Outpatient Medications   Medication Sig Dispense Refill    acetaminophen (TYLENOL) 500 MG tablet Take 1-2 tablets (500-1,000 mg) by mouth every 8 hours as needed for mild pain or fever (greater than 101 degrees)      acyclovir (ZOVIRAX) 400 MG tablet TAKE 1 TABLET BY MOUTH EVERY 12 HOURS 180 tablet 2    amLODIPine (NORVASC) 5 MG tablet Take 1 tablet by mouth once daily 90 tablet 2    amoxicillin (AMOXIL) 500 MG capsule Take 1 capsule (500 mg) by mouth every 8 hours. 8 capsule     Armodafinil 200 MG TABS Take 200 mg by mouth every morning      baclofen (LIORESAL) 10 MG tablet Take 2 tablets (20 mg) in the morning and 3 tablets (30 mg) in the evening.      Calcium Carbonate Antacid 600 MG CHEW Take 600 mg by mouth daily      CRANBERRY PO Take by mouth. Take 1 capsule once daily.      DULoxetine (CYMBALTA) 20 MG capsule TAKE 1 CAPSULE BY MOUTH ONCE DAILY ALONG  WITH  A  60  MG  TABLET  FOR  A  TOTAL  DOSE  OF  80  MG  DAILY 90 capsule 4    DULoxetine (CYMBALTA) 60 MG capsule Take 1 capsule by mouth once daily (Patient taking differently: Take 1 capsule by mouth once daily, along with 20 mg for total of 80 mg daily dose.) 90 capsule 4    estradiol (ESTRACE) 0.1 MG/GM vaginal cream Place 2 g vaginally twice a week. 42.5 g 4    famotidine (PEPCID) 40 MG tablet TAKE 1 TABLET BY MOUTH AT BEDTIME 90 tablet 2    gabapentin (NEURONTIN) 300 MG capsule Take 2 capsules (600 mg) by mouth every morning.      gabapentin (NEURONTIN) 300 MG capsule Take 900 mg by mouth at bedtime.      latanoprost (XALATAN) 0.005 % ophthalmic solution INSTILL 1 DROP INTO BOTH EYES EVERY DAY AS DIRECTED PT WILL NEED TO BE SEEN FOR FUTURE REFILLS      levothyroxine (SYNTHROID/LEVOTHROID) 112 MCG tablet Take 1 tablet (112 mcg) by mouth daily 90 tablet 3    losartan (COZAAR) 50 MG tablet Take 1 tablet (50 mg) by mouth 2 times daily 180 tablet 4    magnesium oxide (MAG-OX) 400 MG tablet Take 400 mg by mouth daily.       "Multiple Vitamins-Minerals (MULTIPLE VITAMINS/WOMENS PO) Take by mouth daily      nitroFURantoin macrocrystal-monohydrate (MACROBID) 100 MG capsule Take 1 capsule (100 mg) by mouth daily.      ondansetron (ZOFRAN ODT) 4 MG ODT tab Take 1 tablet (4 mg) by mouth every 6 hours as needed for nausea or vomiting. 12 tablet 0     No current facility-administered medications for this visit.       ROS:  {ROS FGS:182283}    Vitals:  /67   Pulse 77   Temp 97.9  F (36.6  C)   Resp 18   Ht 1.727 m (5' 8\")   Wt 67.7 kg (149 lb 4.8 oz)   LMP 01/31/2004 (Approximate)   SpO2 93%   BMI 22.70 kg/m    Exam:  GENERAL APPEARANCE:  Alert, in no distress  HEENT:  atraumatic, EOM intact, moist mucus membranes  RESP:  non-labored breathing, lungs clear on auscultation, no respiratory distress, no cough  CV:  Rate regular, S1 S2 noted, 1+ BLE edema  ABDOMEN:  soft, non-distended, non-tender, bowel sounds active  M/S:   wheelchair bound, strength and tone equal bilaterally, no calf pain  SKIN:  warm, dry, thin, fragile, no obvious rash, lesions, ulcerations or petechiae   NEURO:   Face is symmetric, examination of sensation by touch normal, follows and tracks, slow speech  PSYCH:  calm, cooperative      Lab/Diagnostic data:  {fgslab:277060}    ASSESSMENT/PLAN:    {FGS DX2:942493}    Orders:  {fgsorders:170326}  ***  -miralax daily  -change baclofen 20 mg qAM to afternoon  -clarify orders: calcium carbonate 500 mg daily while in TCU, cranberry supplement 125 mg daily     *** minutes spent on the date of this encounter doing chart review, review labs, discussion with nursing staff, patient visit, and documentation.       Electronically signed by:  Dr. Jovanna Nobles, DNP, APRN, AGNP-BC, PHN                  " "Pulse 77   Temp 97.9  F (36.6  C)   Resp 18   Ht 1.727 m (5' 8\")   Wt 67.7 kg (149 lb 4.8 oz)   LMP 01/31/2004 (Approximate)   SpO2 93%   BMI 22.70 kg/m    Exam:  GENERAL APPEARANCE:  Alert, in no distress  HEENT:  atraumatic, EOM intact, moist mucus membranes  RESP:  non-labored breathing, lungs clear on auscultation, no respiratory distress, no cough  CV:  Rate regular, S1 S2 noted, 1+ BLE edema  ABDOMEN:  soft, non-distended, non-tender, bowel sounds active  M/S:   wheelchair bound, strength and tone equal bilaterally, no calf pain  SKIN:  warm, dry, thin, fragile, no obvious rash, lesions, ulcerations or petechiae   NEURO:   Face is symmetric, examination of sensation by touch normal, follows and tracks, slow speech  PSYCH:  calm, cooperative      Lab/Diagnostic data:  Labs reviewed as per Epic and/or Care Everywhere.      ASSESSMENT/PLAN:       Episode of altered cognition  Reason for hospitalization. Etiology unclear. IP neurology consulted; less concerned of MS flare. Today, no concerns.   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 Redman from [facility]; MRI on 3/21 reportedly stable. Today, patient notes spasm in the afternoon; discussed concern of medication side effects if added another dose of baclofen; consider decreasing morning dose of gabapentin or adjusting current dose of baclofen to accommodate multiple doses through the day. Patient agree to trial change morning baclofen to afternoon time.   -change baclofen 20 mg qAM to afternoon  -continue baclofen 30 mg at bedtime  -continue gabapentin 600 mg every morning and 300 mg at bedtime  -follow-up with neurology as recommended    Essential hypertension  //88 mmHg. 72-79 bpm.   -continue losartan 50 mg twice daily  -continue amlodipine 5 mg daily    Dysuria  Recent cultures with no bacterial growth despite reports of dysuria. " Discharged from hospital with 5 days of amoxicillin, last day 4/1. Today, VSS.  -PTA macrobid 100 mg daily for prophylaxis  -PTA estradiol 2 gram twice weekly  -monitor for worsening signs/symptoms    MARYLU (obstructive sleep apnea)  IP neurology recommended seeing sleep neurologist to evaluate for sleep-related etiology for head drop spells.  -PTA armodafinil 300 mg daily    Major depressive disorder, remission status unspecified, unspecified whether recurrent  -duloxetine 80 mg daily  -monitor mood and behaviors    Malignant neoplasm of upper-outer quadrant of left breast in female, estrogen receptor positive (H)  Previously on Lemtrada; transitioned Briumvi in Feb 2025; next infusion due in 5 months.   -follow-up with oncology    Other specified hypothyroidism  Last TSH 1.31 (3/26/25)  -levothyroxine 112 mcg daily  -trend TSH periodically    Slow transit constipation   -start miralax 17 g daily  -adjust bowel meds as needed    Polypharmacy  Likely contributing to altered cognition.  -reduce polypharmacy as able    Takes dietary supplements   Clarified orders today.  -calcium carbonate 500 mg daily while in TCU vs 600 mg due to no supply per pharmacy  -take cranberry supplement 125 mg daily while in TCU    Generalized muscle weakness  Secondary to diagnoses above and recent hospitalization. In TCU for rehabilitation.   -PT/OT eval and treat - adv per recommendations   -SW available for safe discharge planning ongoing        65 minutes spent on the date of this encounter doing chart review, review labs, discussion with nursing staff, patient visit, and documentation.       Electronically signed by:  Dr. Jovanna Nobles, DNP, APRN, AGNP-BC, PHN    This note was completed with the assistance of dictation software. Typos and word substitution-errors are expected and unintended.

## 2025-03-31 NOTE — PROGRESS NOTES
Clinic Care Coordination Contact  Care Coordination Transition Communication    Clinical Data: Patient was hospitalized at [facility] from 3/26/25 to 3/29/25 with diagnosis of Urinary Tract Infection without Hematuria, MS, and Acute Cystitis without Hematuria.     Assessment: Patient has transitioned to TCU/ARU for short term rehabilitation:    Facility Name: [facility]  Transition Communication:  Notified facility of Primary Care- Care Coordination support via Telephone. JUSTIN CC spoke to Geetha. ([phone])    Plan: Care Coordinator will await notification from facility staff informing of patient's discharge plans/needs. Care Coordinator will review chart and outreach to facility staff every 4 weeks and as needed.     Mayra Brown [facility]  Social Work Care Coordinator  [facility]  [email or web address]  Office: [phone]  Gender pronouns: she/her

## 2025-04-03 ENCOUNTER — TRANSITIONAL CARE UNIT VISIT (OUTPATIENT)
Dept: GERIATRICS | Facility: CLINIC | Age: 70
End: 2025-04-03
Payer: MEDICARE

## 2025-04-03 VITALS
SYSTOLIC BLOOD PRESSURE: 132 MMHG | WEIGHT: 149 LBS | OXYGEN SATURATION: 96 % | HEART RATE: 71 BPM | DIASTOLIC BLOOD PRESSURE: 87 MMHG | HEIGHT: 68 IN | BODY MASS INDEX: 22.58 KG/M2 | TEMPERATURE: 97.1 F | RESPIRATION RATE: 16 BRPM

## 2025-04-03 DIAGNOSIS — R30.0 DYSURIA: ICD-10-CM

## 2025-04-03 DIAGNOSIS — G35 MS (MULTIPLE SCLEROSIS) (H): ICD-10-CM

## 2025-04-03 DIAGNOSIS — N76.0 VAGINITIS AND VULVOVAGINITIS: ICD-10-CM

## 2025-04-03 DIAGNOSIS — N95.8 GENITOURINARY SYNDROME OF MENOPAUSE: ICD-10-CM

## 2025-04-03 DIAGNOSIS — G82.20 SPASTIC PARAPLEGIA SECONDARY TO MULTIPLE SCLEROSIS (H): ICD-10-CM

## 2025-04-03 DIAGNOSIS — Z79.899 POLYPHARMACY: ICD-10-CM

## 2025-04-03 DIAGNOSIS — G47.33 OSA (OBSTRUCTIVE SLEEP APNEA): ICD-10-CM

## 2025-04-03 DIAGNOSIS — R68.89 SENSATION OF FEELING HOT: ICD-10-CM

## 2025-04-03 DIAGNOSIS — E03.8 OTHER SPECIFIED HYPOTHYROIDISM: ICD-10-CM

## 2025-04-03 DIAGNOSIS — G47.39 TREATMENT-EMERGENT CENTRAL SLEEP APNEA: ICD-10-CM

## 2025-04-03 DIAGNOSIS — B37.31 VULVOVAGINAL CANDIDIASIS: ICD-10-CM

## 2025-04-03 DIAGNOSIS — G35 SPASTIC PARAPLEGIA SECONDARY TO MULTIPLE SCLEROSIS (H): ICD-10-CM

## 2025-04-03 DIAGNOSIS — R41.89 EPISODE OF ALTERED COGNITION: Primary | ICD-10-CM

## 2025-04-03 DIAGNOSIS — G47.411 NARCOLEPSY AND CATAPLEXY: ICD-10-CM

## 2025-04-03 NOTE — PROGRESS NOTES
"[facility] GERIATRICS    PRIMARY CARE PROVIDER AND CLINIC:  Carolina Pulliam MD, [address] / [place] [address]  Chief Complaint   Patient presents with    Hospital F/U      [facility] Medical Record Number:  [id number]  Place of Service where encounter took place:  [facility] ([facility]) [[id number]]    Marylee Woods  is a 69 year old  (1955), multiple sclerosis with spasticity, MARYLU/CSA c/b narcolepsy, GUSM who was admitted to the above facility from  [facility]. Hospital stay 3/26/25 through 3/29/25.    HPI:    Hospital course    Presented to the ED due to concerns for confusion and feeling \"fuzzy.\"  Reports she had been diagnosed with UTI 3/9/2025 and admitted to the hospital related to this and norovirus.  Was started on IV amoxicillin and discharged with oral amoxicillin.  Still experiencing confusion and fussiness following her discharge.  She then developed increased urinary frequency and dysuria again.  No other associated symptoms.  In the ED, evaluation with overall normal BMP, CBC with mild anemia.  Negative flu, RSV, COVID test.  TSH normal.  UA relatively unchanged from prior with trace blood, leuk esterase, bacteria, and yeast.  Urine culture ultimately with no growth (notably culture from 3/9/2025 also with no growth).      She had been seen in her clinic 3/20/2025 with similar neurological symptoms.  UA at that time was collected and did have polymicrobial growth.  However noted to have been collected in reusable hat and concern for contamination.  Symptoms had resolved and so antibacterials were not sent.  She was also seen on the 21st with ongoing cognitive changes and so an MRI was obtained to evaluate for possible new MS plaque.  This showed chronic microvascular ischemic changes and possible demyelination, but no obvious plaque or other acute changes.    Admitted for further evaluation.  She did receive IV ceftriaxone for presumed infection. "  Neurology was consulted.  Overall did not suspect MS flare.  There were concerns for head drop which was felt to be related to MARYLU and poor sleep quality.  Recommended outpatient sleep evaluation.    Discharged on amoxicillin to complete 5-day course (has since been completed) and to resume home daily Macrobid prophylaxis after this.    Seen previously by my colleague.  Adjustments to her home medications were made.     Today  She notes that she completed her amoxicillin yesterday and that she is having burning with urination again.  Unclear if she has worsening urinary frequency or hematuria.  She is incontinent and often does not feel her urination has not recently seen it with pad changes.  She does note that a sensation of feeling warm and has open the windows and goes in her room to cool down.  No abdominal pain.  No nausea or vomiting.  No chills.  No diarrhea or constipation.  No chest pain or chest pressure, no shortness of breath.      She has been having difficulty with sleep.  Experiences spasms at night.  Additionally will experience spasms in her foot with her braces.  She noted she got her 20 mg of baclofen around lunchtime and recently received her 30 mg of baclofen.  However at home she typically takes her 20 mg baclofen in the morning when she wakes up and 30 mg of baclofen at night before bed.  Seems as though this was working fairly well for her.    She lives at home with her spouse and daughter who assists with most things.  She does have a power chair she is independently mobile with.    CODE STATUS/ADVANCE DIRECTIVES DISCUSSION:  Full Code   ALLERGIES:   Allergies   Allergen Reactions    Bactrim [Sulfamethoxazole-Trimethoprim] Hallucination    Hydrochlorothiazide      Hyponatremia    Sulfamethizole     Amantadine      hallucinations    Budesonide     Budesonide-Formoterol Fumarate Rash      PAST MEDICAL HISTORY:   Past Medical History:   Diagnosis Date    Acute cystitis without hematuria      Acute pain of right knee     Atypical ductal hyperplasia of left breast 02/2022    Candida esophagitis - 2019 (H)     CKD (chronic kidney disease) stage 3, GFR 30-59 ml/min (H)     Community acquired pneumonia, unspecified laterality     Cough, unspecified type 04/05/2024    Depression     Epigastric pain     Former tobacco use     Fracture of femur, distal, left, closed (H)     Gastro-oesophageal reflux disease     Graves disease     History of fracture of fibula     History of tibial fracture     HSV (herpes simplex virus) infection     Hyperlipidemia with target LDL less than 130     Hyponatremia     L tib fx s/p IM nailing     Lung nodules     Currently managed with follow up imaging by [facility] result Team.       Multiple sclerosis (H)     Osteoporosis     Postablative hypothyroidism       PAST SURGICAL HISTORY:   has a past surgical history that includes hip surgery (2009); c/section, low transverse (1992); Laparoscopic cholecystectomy (6/24/2014); colonoscopy (2007); Esophagoscopy, gastroscopy, duodenoscopy (EGD), combined (1/26/2017); orthopedic surgery (2009); Open reduction internal fixation rodding intramedullary tibia (4/14/2013); Esophagoscopy, gastroscopy, duodenoscopy (EGD), combined (N/A, 1/26/2017); Colonoscopy (N/A, 1/26/2017); Open reduction internal fixation femur distal (Left, 11/1/2018); Lumpectomy breast (Left, 3/31/2022); and Esophagoscopy, gastroscopy, duodenoscopy (EGD), combined (N/A, 4/20/2023).  FAMILY HISTORY: family history includes Cancer in her maternal grandfather; Heart Disease in her father, maternal grandmother, mother, and paternal grandfather.  SOCIAL HISTORY:   reports that she quit smoking about 3 years ago. Her smoking use included cigarettes. She has been exposed to tobacco smoke. She has never used smokeless tobacco. She reports current alcohol use. She reports that she does not use drugs.  Patient's living condition: lives with spouse    Post Discharge  Medication Reconciliation Status:   MED REC REQUIRED  Post Medication Reconciliation Status: medication reconcilation previously completed during another office visit       Current Outpatient Medications   Medication Sig Dispense Refill    acetaminophen (TYLENOL) 500 MG tablet Take 1-2 tablets (500-1,000 mg) by mouth every 8 hours as needed for mild pain or fever (greater than 101 degrees)      acyclovir (ZOVIRAX) 400 MG tablet TAKE 1 TABLET BY MOUTH EVERY 12 HOURS 180 tablet 2    amLODIPine (NORVASC) 5 MG tablet Take 1 tablet by mouth once daily 90 tablet 2    amoxicillin (AMOXIL) 500 MG capsule Take 1 capsule (500 mg) by mouth every 8 hours. 8 capsule     Armodafinil 200 MG TABS Take 200 mg by mouth every morning      baclofen (LIORESAL) 10 MG tablet Take 2 tablets (20 mg) in the morning and 3 tablets (30 mg) in the evening.      Calcium Carbonate Antacid 600 MG CHEW Take 600 mg by mouth daily      CRANBERRY PO Take by mouth. Take 1 capsule once daily.      DULoxetine (CYMBALTA) 20 MG capsule TAKE 1 CAPSULE BY MOUTH ONCE DAILY ALONG  WITH  A  60  MG  TABLET  FOR  A  TOTAL  DOSE  OF  80  MG  DAILY 90 capsule 4    DULoxetine (CYMBALTA) 60 MG capsule Take 1 capsule by mouth once daily (Patient taking differently: Take 1 capsule by mouth once daily, along with 20 mg for total of 80 mg daily dose.) 90 capsule 4    estradiol (ESTRACE) 0.1 MG/GM vaginal cream Place 2 g vaginally twice a week. 42.5 g 4    famotidine (PEPCID) 40 MG tablet TAKE 1 TABLET BY MOUTH AT BEDTIME 90 tablet 2    gabapentin (NEURONTIN) 300 MG capsule Take 2 capsules (600 mg) by mouth every morning.      gabapentin (NEURONTIN) 300 MG capsule Take 900 mg by mouth at bedtime.      latanoprost (XALATAN) 0.005 % ophthalmic solution INSTILL 1 DROP INTO BOTH EYES EVERY DAY AS DIRECTED PT WILL NEED TO BE SEEN FOR FUTURE REFILLS      levothyroxine (SYNTHROID/LEVOTHROID) 112 MCG tablet Take 1 tablet (112 mcg) by mouth daily 90 tablet 3    losartan (COZAAR) 50  "MG tablet Take 1 tablet (50 mg) by mouth 2 times daily 180 tablet 4    magnesium oxide (MAG-OX) 400 MG tablet Take 400 mg by mouth daily.      Multiple Vitamins-Minerals (MULTIPLE VITAMINS/WOMENS PO) Take by mouth daily      nitroFURantoin macrocrystal-monohydrate (MACROBID) 100 MG capsule Take 1 capsule (100 mg) by mouth daily.      ondansetron (ZOFRAN ODT) 4 MG ODT tab Take 1 tablet (4 mg) by mouth every 6 hours as needed for nausea or vomiting. 12 tablet 0     No current facility-administered medications for this visit.     Vitals:  /87   Pulse 71   Temp 97.1  F (36.2  C)   Resp 16   Ht 1.727 m (5' 8\")   Wt 67.6 kg (149 lb)   LMP 01/31/2004 (Approximate)   SpO2 96%   BMI 22.66 kg/m    Exam:    GENERAL APPEARANCE: Seated comfortably, NAD though did have spasm of right foot during encounter  HENT:  NCAT, mildly [place]  PULM  Normal WOB on RA, lungs CTAB, no wheezes or crackles  CV:  RRR, S1/S2 normal, no murmurs; no LE edema  ABDOMEN: Abdomen soft, not tender, not distended, BS normal and active throughout   : Vulva with moderate erythema with atrophy and retraction of bilateral labia majora leading to urethral orifice exposure with friable tissue. TTP of vulva. No discharge.   NEURO: Alert, answering questions appropriately, normal thought processes; CN II-XII; bilateral leg braces in place  PSYCH:  Mood normal with congruent affect    Lab/Diagnostic data:    Recent labs and imaging in [facility] reviewed by me today.      ASSESSMENT/PLAN:    (R41.89) Episode of altered cognition  (primary encounter diagnosis)  Comment: Etiology unclear.  Suspect multiple contributors including polypharmacy, possible related to underlying MS, MARYLU/CSA and narcolepsy.  Seems to have resolved at this time.    (Z04.529) Polypharmacy  Comment: As noted possibly contributing to the above.  Possibly contribution of relatively elevated dose of duloxetine, gabapentin, baclofen, famotidine.  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 Redman with [facility], [facility].  Does receive infusions.  Was evaluated by neurology during admission and did not suspect MS flare as cause of her symptoms.  Do wonder MS associated cognitive impairment.  Plan:   -Change baclofen to home dosing 20 mg a.m., 30 mg p.m.-  --would consider multiple lower doses through the day, f.e.,  10 mg 4 times daily  -Consider decreased dose of gabapentin    (N95.8) Genitourinary syndrome of menopause  (N76.0) Vaginitis and vulvovaginitis  (R30.0) Dysuria  Comment: Continues to have episodes of dysuria.  I do not suspect acute cystitis.  She has been on amoxicillin for essentially 1 month.  She has had no bacterial growth in her cultures.  Suspect dysuria is primarily related to significant vulvovaginitis noted on exam with associated atrophy and exposure of the tissues.  Plan:   -Increase Estrace cream to 2 g daily for 2 weeks then reevaluate    (B37.31) Vulvovaginal candidiasis  Comment: Significant vulvovaginal erythema and tenderness to palpation during exam.  Particularly in the context of multiple antibacterial courses, may represent overlapping vulvovaginal candidiasis in addition to GUSM  Plan:   -Fluconazole 150 mg daily x 3 days    (R68.89) Sensation of feeling hot  Comment: Etiology unclear.  May represent recurrence of vasomotor symptoms of menopause.  TSH in the hospital reasonable for age and function at 1.31.  Do not suspect infectious etiology.  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  Does states she uses her AVS at home regularly.  May need adjustment.  Additionally on armodafinil related to narcolepsy but may be having breakthrough symptoms.  Plan:   -Recommend follow-up with current outpatient sleep neurologist    (E03.8) Other specified hypothyroidism  Comment: As noted, TSH reasonable.  Do not suspect leading to feeling of hyperthermia.  Plan:   -Continue levothyroxine 112 mcg daily    Orders:  [x] Adjust baclofen to morning and evening. Add afternoon PRN dose while transitioning  [x] Ibuprofen 400mg as needed for pain  [x] Fluconazole 150mg x 3 days  [x] Increase estrogen cream to daily x 2 weeks       Electronically signed by:    Benjamin Rosenstein, MD, MA  [facility]     This note was completed with the assistance of dictation software. Typos and word substitution-errors are expected and unintended.      81 MINUTES SPENT BY ME on the date of service doing chart review, history, exam, documentation & further activities per the note.

## 2025-04-03 NOTE — LETTER
" 4/3/2025      Marylee Woods  [address]  [facility] [address]        [facility] GERIATRICS    PRIMARY CARE PROVIDER AND CLINIC:  Carolina Pulliam MD, [address] [address] / [place] [address]  Chief Complaint   Patient presents with     Hospital F/U      [facility] Record Number:  [id number]  Place of Service where encounter took place:  [facility] ([facility]) [[id number]]    Marylee Woods  is a 69 year old  (1955), multiple sclerosis with spasticity, MARYLU/CSA c/b narcolepsy, GUSM who was admitted to the above facility from  [facility]. Hospital stay 3/26/25 through 3/29/25.    HPI:    Hospital course    Presented to the ED due to concerns for confusion and feeling \"fuzzy.\"  Reports she had been diagnosed with UTI 3/9/2025 and admitted to the hospital related to this and norovirus.  Was started on IV amoxicillin and discharged with oral amoxicillin.  Still experiencing confusion and fussiness following her discharge.  She then developed increased urinary frequency and dysuria again.  No other associated symptoms.  In the ED, evaluation with overall normal BMP, CBC with mild anemia.  Negative flu, RSV, COVID test.  TSH normal.  UA relatively unchanged from prior with trace blood, leuk esterase, bacteria, and yeast.  Urine culture ultimately with no growth (notably culture from 3/9/2025 also with no growth).      She had been seen in her clinic 3/20/2025 with similar neurological symptoms.  UA at that time was collected and did have polymicrobial growth.  However noted to have been collected in reusable hat and concern for contamination.  Symptoms had resolved and so antibacterials were not sent.  She was also seen on the 21st with ongoing cognitive changes and so an MRI was obtained to evaluate for possible new MS plaque.  This showed chronic microvascular ischemic changes and possible demyelination, but no obvious plaque or other acute " changes.    Admitted for further evaluation.  She did receive IV ceftriaxone for presumed infection.  Neurology was consulted.  Overall did not suspect MS flare.  There were concerns for head drop which was felt to be related to MARYLU and poor sleep quality.  Recommended outpatient sleep evaluation.    Discharged on amoxicillin to complete 5-day course (has since been completed) and to resume home daily Macrobid prophylaxis after this.    Seen previously by my colleague.  Adjustments to her home medications were made.     Today  She notes that she completed her amoxicillin yesterday and that she is having burning with urination again.  Unclear if she has worsening urinary frequency or hematuria.  She is incontinent and often does not feel her urination has not recently seen it with pad changes.  She does note that a sensation of feeling warm and has open the windows and goes in her room to cool down.  No abdominal pain.  No nausea or vomiting.  No chills.  No diarrhea or constipation.  No chest pain or chest pressure, no shortness of breath.      She has been having difficulty with sleep.  Experiences spasms at night.  Additionally will experience spasms in her foot with her braces.  She noted she got her 20 mg of baclofen around lunchtime and recently received her 30 mg of baclofen.  However at home she typically takes her 20 mg baclofen in the morning when she wakes up and 30 mg of baclofen at night before bed.  Seems as though this was working fairly well for her.    She lives at home with her spouse and daughter who assists with most things.  She does have a power chair she is independently mobile with.    CODE STATUS/ADVANCE DIRECTIVES DISCUSSION:  Full Code   ALLERGIES:   Allergies   Allergen Reactions     Bactrim [Sulfamethoxazole-Trimethoprim] Hallucination     Hydrochlorothiazide      Hyponatremia     Sulfamethizole      Amantadine      hallucinations     Budesonide      Budesonide-Formoterol Fumarate Rash       PAST MEDICAL HISTORY:   Past Medical History:   Diagnosis Date     Acute cystitis without hematuria      Acute pain of right knee      Atypical ductal hyperplasia of left breast 02/2022     Candida esophagitis - 2019 (H)      CKD (chronic kidney disease) stage 3, GFR 30-59 ml/min (H)      Community acquired pneumonia, unspecified laterality      Cough, unspecified type 04/05/2024     Depression      Epigastric pain      Former tobacco use      Fracture of femur, distal, left, closed (H)      Gastro-oesophageal reflux disease      Graves disease      History of fracture of fibula      History of tibial fracture      HSV (herpes simplex virus) infection      Hyperlipidemia with target LDL less than 130      Hyponatremia      L tib fx s/p IM nailing      Lung nodules     Currently managed with follow up imaging by [facility] result Team.        Multiple sclerosis (H)      Osteoporosis      Postablative hypothyroidism       PAST SURGICAL HISTORY:   has a past surgical history that includes hip surgery (2009); c/section, low transverse (1992); Laparoscopic cholecystectomy (6/24/2014); colonoscopy (2007); Esophagoscopy, gastroscopy, duodenoscopy (EGD), combined (1/26/2017); orthopedic surgery (2009); Open reduction internal fixation rodding intramedullary tibia (4/14/2013); Esophagoscopy, gastroscopy, duodenoscopy (EGD), combined (N/A, 1/26/2017); Colonoscopy (N/A, 1/26/2017); Open reduction internal fixation femur distal (Left, 11/1/2018); Lumpectomy breast (Left, 3/31/2022); and Esophagoscopy, gastroscopy, duodenoscopy (EGD), combined (N/A, 4/20/2023).  FAMILY HISTORY: family history includes Cancer in her maternal grandfather; Heart Disease in her father, maternal grandmother, mother, and paternal grandfather.  SOCIAL HISTORY:   reports that she quit smoking about 3 years ago. Her smoking use included cigarettes. She has been exposed to tobacco smoke. She has never used smokeless tobacco. She reports  current alcohol use. She reports that she does not use drugs.  Patient's living condition: lives with spouse    Post Discharge Medication Reconciliation Status:   MED REC REQUIRED  Post Medication Reconciliation Status: medication reconcilation previously completed during another office visit       Current Outpatient Medications   Medication Sig Dispense Refill     acetaminophen (TYLENOL) 500 MG tablet Take 1-2 tablets (500-1,000 mg) by mouth every 8 hours as needed for mild pain or fever (greater than 101 degrees)       acyclovir (ZOVIRAX) 400 MG tablet TAKE 1 TABLET BY MOUTH EVERY 12 HOURS 180 tablet 2     amLODIPine (NORVASC) 5 MG tablet Take 1 tablet by mouth once daily 90 tablet 2     amoxicillin (AMOXIL) 500 MG capsule Take 1 capsule (500 mg) by mouth every 8 hours. 8 capsule      Armodafinil 200 MG TABS Take 200 mg by mouth every morning       baclofen (LIORESAL) 10 MG tablet Take 2 tablets (20 mg) in the morning and 3 tablets (30 mg) in the evening.       Calcium Carbonate Antacid 600 MG CHEW Take 600 mg by mouth daily       CRANBERRY PO Take by mouth. Take 1 capsule once daily.       DULoxetine (CYMBALTA) 20 MG capsule TAKE 1 CAPSULE BY MOUTH ONCE DAILY ALONG  WITH  A  60  MG  TABLET  FOR  A  TOTAL  DOSE  OF  80  MG  DAILY 90 capsule 4     DULoxetine (CYMBALTA) 60 MG capsule Take 1 capsule by mouth once daily (Patient taking differently: Take 1 capsule by mouth once daily, along with 20 mg for total of 80 mg daily dose.) 90 capsule 4     estradiol (ESTRACE) 0.1 MG/GM vaginal cream Place 2 g vaginally twice a week. 42.5 g 4     famotidine (PEPCID) 40 MG tablet TAKE 1 TABLET BY MOUTH AT BEDTIME 90 tablet 2     gabapentin (NEURONTIN) 300 MG capsule Take 2 capsules (600 mg) by mouth every morning.       gabapentin (NEURONTIN) 300 MG capsule Take 900 mg by mouth at bedtime.       latanoprost (XALATAN) 0.005 % ophthalmic solution INSTILL 1 DROP INTO BOTH EYES EVERY DAY AS DIRECTED PT WILL NEED TO BE SEEN FOR  "FUTURE REFILLS       levothyroxine (SYNTHROID/LEVOTHROID) 112 MCG tablet Take 1 tablet (112 mcg) by mouth daily 90 tablet 3     losartan (COZAAR) 50 MG tablet Take 1 tablet (50 mg) by mouth 2 times daily 180 tablet 4     magnesium oxide (MAG-OX) 400 MG tablet Take 400 mg by mouth daily.       Multiple Vitamins-Minerals (MULTIPLE VITAMINS/WOMENS PO) Take by mouth daily       nitroFURantoin macrocrystal-monohydrate (MACROBID) 100 MG capsule Take 1 capsule (100 mg) by mouth daily.       ondansetron (ZOFRAN ODT) 4 MG ODT tab Take 1 tablet (4 mg) by mouth every 6 hours as needed for nausea or vomiting. 12 tablet 0     No current facility-administered medications for this visit.     Vitals:  /87   Pulse 71   Temp 97.1  F (36.2  C)   Resp 16   Ht 1.727 m (5' 8\")   Wt 67.6 kg (149 lb)   LMP 01/31/2004 (Approximate)   SpO2 96%   BMI 22.66 kg/m    Exam:    GENERAL APPEARANCE: Seated comfortably, NAD though did have spasm of right foot during encounter  HENT:  NCAT, mildly Belkofski  PULM  Normal WOB on RA, lungs CTAB, no wheezes or crackles  CV:  RRR, S1/S2 normal, no murmurs; no LE edema  ABDOMEN: Abdomen soft, not tender, not distended, BS normal and active throughout   : Vulva with moderate erythema with atrophy and retraction of bilateral labia majora leading to urethral orifice exposure with friable tissue. TTP of vulva. No discharge.   NEURO: Alert, answering questions appropriately, normal thought processes; CN II-XII; bilateral leg braces in place  PSYCH:  Mood normal with congruent affect    Lab/Diagnostic data:    Recent labs and imaging in [facility] reviewed by me today.      ASSESSMENT/PLAN:    (R41.50) Episode of altered cognition  (primary encounter diagnosis)  Comment: Etiology unclear.  Suspect multiple contributors including polypharmacy, possible related to underlying MS, MARYLU/CSA and narcolepsy.  Seems to have resolved at this time.    (Z99.618) Polypharmacy  Comment: As noted possibly contributing to " the above.  Possibly contribution of relatively elevated dose of duloxetine, gabapentin, baclofen, famotidine.  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 Redman with [facility], [facility].  Does receive infusions.  Was evaluated by neurology during admission and did not suspect MS flare as cause of her symptoms.  Do wonder MS associated cognitive impairment.  Plan:   -Change baclofen to home dosing 20 mg a.m., 30 mg p.m.-  --would consider multiple lower doses through the day, f.e.,  10 mg 4 times daily  -Consider decreased dose of gabapentin    (N95.8) Genitourinary syndrome of menopause  (N76.0) Vaginitis and vulvovaginitis  (R30.0) Dysuria  Comment: Continues to have episodes of dysuria.  I do not suspect acute cystitis.  She has been on amoxicillin for essentially 1 month.  She has had no bacterial growth in her cultures.  Suspect dysuria is primarily related to significant vulvovaginitis noted on exam with associated atrophy and exposure of the tissues.  Plan:   -Increase Estrace cream to 2 g daily for 2 weeks then reevaluate    (B37.31) Vulvovaginal candidiasis  Comment: Significant vulvovaginal erythema and tenderness to palpation during exam.  Particularly in the context of multiple antibacterial courses, may represent overlapping vulvovaginal candidiasis in addition to GUSM  Plan:   -Fluconazole 150 mg daily x 3 days    (R68.89) Sensation of feeling hot  Comment: Etiology unclear.  May represent recurrence of vasomotor symptoms of menopause.  TSH in the hospital reasonable for age and function at 1.31.  Do not suspect infectious etiology.  Plan:   -Continue conservative treatment measures  -Monitor for improvement with adjustments of baclofen  -Possibly consider oral estrogen pending course    (G47.33) MARYLU (obstructive  sleep apnea)  (G47.39) Treatment-emergent central sleep apnea  (G47.411) Narcolepsy and cataplexy  Comment: Suspect may be contributing to episodes of altered cognition.  Does states she uses her AVS at home regularly.  May need adjustment.  Additionally on armodafinil related to narcolepsy but may be having breakthrough symptoms.  Plan:   -Recommend follow-up with current outpatient sleep neurologist    (E03.8) Other specified hypothyroidism  Comment: As noted, TSH reasonable.  Do not suspect leading to feeling of hyperthermia.  Plan:   -Continue levothyroxine 112 mcg daily    Orders:  [x] Adjust baclofen to morning and evening. Add afternoon PRN dose while transitioning  [x] Ibuprofen 400mg as needed for pain  [x] Fluconazole 150mg x 3 days  [x] Increase estrogen cream to daily x 2 weeks       Electronically signed by:    Benjamin Rosenstein, MD, MA  [facility]     This note was completed with the assistance of dictation software. Typos and word substitution-errors are expected and unintended.      81 MINUTES SPENT BY ME on the date of service doing chart review, history, exam, documentation & further activities per the note.          Sincerely,        Benjamin Rosenstein, MD    Electronically signed

## 2025-04-07 ENCOUNTER — TRANSITIONAL CARE UNIT VISIT (OUTPATIENT)
Dept: GERIATRICS | Facility: CLINIC | Age: 70
End: 2025-04-07
Payer: MEDICARE

## 2025-04-07 VITALS
SYSTOLIC BLOOD PRESSURE: 121 MMHG | HEIGHT: 68 IN | WEIGHT: 149 LBS | BODY MASS INDEX: 22.58 KG/M2 | RESPIRATION RATE: 18 BRPM | TEMPERATURE: 97.1 F | DIASTOLIC BLOOD PRESSURE: 62 MMHG | HEART RATE: 70 BPM | OXYGEN SATURATION: 99 %

## 2025-04-07 DIAGNOSIS — B37.31 VULVOVAGINAL CANDIDIASIS: ICD-10-CM

## 2025-04-07 DIAGNOSIS — R41.89 EPISODE OF ALTERED COGNITION: Primary | ICD-10-CM

## 2025-04-07 DIAGNOSIS — G35 SPASTIC PARAPLEGIA SECONDARY TO MULTIPLE SCLEROSIS (H): ICD-10-CM

## 2025-04-07 DIAGNOSIS — Z17.0 MALIGNANT NEOPLASM OF UPPER-OUTER QUADRANT OF LEFT BREAST IN FEMALE, ESTROGEN RECEPTOR POSITIVE (H): ICD-10-CM

## 2025-04-07 DIAGNOSIS — K59.01 SLOW TRANSIT CONSTIPATION: ICD-10-CM

## 2025-04-07 DIAGNOSIS — M62.81 GENERALIZED MUSCLE WEAKNESS: ICD-10-CM

## 2025-04-07 DIAGNOSIS — I10 ESSENTIAL HYPERTENSION: ICD-10-CM

## 2025-04-07 DIAGNOSIS — E03.8 OTHER SPECIFIED HYPOTHYROIDISM: ICD-10-CM

## 2025-04-07 DIAGNOSIS — Z78.9 TAKES DIETARY SUPPLEMENTS: ICD-10-CM

## 2025-04-07 DIAGNOSIS — G35 MS (MULTIPLE SCLEROSIS) (H): ICD-10-CM

## 2025-04-07 DIAGNOSIS — N95.8 GENITOURINARY SYNDROME OF MENOPAUSE: ICD-10-CM

## 2025-04-07 DIAGNOSIS — F32.9 MAJOR DEPRESSIVE DISORDER, REMISSION STATUS UNSPECIFIED, UNSPECIFIED WHETHER RECURRENT: ICD-10-CM

## 2025-04-07 DIAGNOSIS — G47.33 OSA (OBSTRUCTIVE SLEEP APNEA): ICD-10-CM

## 2025-04-07 DIAGNOSIS — G82.20 SPASTIC PARAPLEGIA SECONDARY TO MULTIPLE SCLEROSIS (H): ICD-10-CM

## 2025-04-07 DIAGNOSIS — C50.412 MALIGNANT NEOPLASM OF UPPER-OUTER QUADRANT OF LEFT BREAST IN FEMALE, ESTROGEN RECEPTOR POSITIVE (H): ICD-10-CM

## 2025-04-07 DIAGNOSIS — R30.0 DYSURIA: ICD-10-CM

## 2025-04-07 PROCEDURE — 99316 NF DSCHRG MGMT 30 MIN+: CPT

## 2025-04-07 NOTE — LETTER
4/7/2025      Marylee Woods  [address]  [facility] [address]        [facility] GERIATRICS DISCHARGE SUMMARY  PATIENT'S NAME: Marylee Woods  YOB: 1955  MEDICAL RECORD NUMBER:  [id number]  Place of Service where encounter took place:  [facility] (SNF) [[id number]]    PRIMARY CARE PROVIDER AND CLINIC RESPONSIBLE AFTER TRANSFER:   Carolina Pulliam MD, [address] [place] [address] / [place] [address]        Transferring providers: Jovanna Nobles DNP, Benjamin Rosenstein, MD  Recent Hospitalization/ED:  [facility] stay 3/26/2025 to 3/29/2025.  Date of SNF Admission: 3/29/2025  Date of SNF (anticipated) Discharge: 4/8/25  Discharged to: previous independent home  Cognitive Scores: Short blessed: 2/28  Physical Function: Wheelchair dependent  DME: No DME needed    CODE STATUS/ADVANCE DIRECTIVES DISCUSSION:  Full Code   ALLERGIES: Bactrim [sulfamethoxazole-trimethoprim], Hydrochlorothiazide, Sulfamethizole, Amantadine, Budesonide, and Budesonide-formoterol fumarate    NURSING FACILITY COURSE   Medication Changes/Rationale:   See below    Summary of nursing facility stay:   Marylee Woods  is a 69 year old  (1955), with PMH: multiple sclerosis wheelchair bound, malignant neoplasm of left breast s/p lumpectomy in 2022, MARYLU, HTN, anemia, and depression.  **[facility] 3/9-3/14: admitted for UTI and acute colitis due to norovirus. Treated with IV ampicillin -> PO amoxicillin and discharged back home.  **Seen by PCP on 3/20 who ordered UA/UC for concern of recurrent UTI and reported ongoing neurological changes since last hospital discharge; UC likely contaminated. On 3/24, patient reported dysuria. Patient had 10 tabs left of amoxicillin from previous UTI; PCP advised to take the amoxicillin for 5 days.   **This hospitalization: presented to ED for ongoing confusion and weakness. CRP 11. UA showed trace blood, large LE, moderate bacteria, and  "moderate yeast. Given IV ceftriaxone x1 dose in ED. Neurology consulted; no new focal findings on exam to support MS flare; weakness likely prolonged illness and deconditioning; head drop spells may be hypotension related from prolonged sitting or possible sleep-related phenomenon; consider follow-up with sleep neurologist to assess for sleep-related etiology. UC grew urogenital germaine; IV rocephin-> PO amoxicillin on 3/27; plan for 5 day amoxicillin given urinary symptoms. Discharged to this facility for medical management, rehab, and nursing care    Today:  Nursing staff reports no acute medical concerns. Patient dozes off often during the day.    Patient is seen today for a visit in her room. She is sleeping in her bed. Takes some time to awaken her. Feels \"just a mess\". \"No\" pain. She does not recall working with therapies; notes, \"I was here, I just came here from the hospital\".  Appetite is \"fine\". Endorses her bowels are moving and she has \"no\" belly pain. When asked about bladder, \"no\" dysuria, \"no\" burning sensation, \"no\" pain. Sleeping \"not well\". Denies worsening mood or depression. Denies CP, palpitations, lightheadedness, dizziness, SOB, fever, chills, nausea/vomiting concerns today. Patient dozes off frequently during visit.     ASSESSMENT/PLAN:        Episode of altered cognition  Reason for hospitalization. Etiology unclear. IP neurology consulted; less concerned of MS flare. Today, no concerns.   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 Redman from [facility]; MRI on 3/21 reportedly stable. In TCU, PRN baclofen added for reported afternoon spasms. Today, patient denies concerns.  -baclofen 20 mg qAM and baclofen 30 mg at bedtime; 20 mg daily as needed  -continue gabapentin 600 mg every morning and 900 mg at bedtime  -follow-up with neurology as recommended     Essential hypertension  BP " 103//76 mmHg. 71-80 bpm.   -continue losartan 50 mg twice daily  -continue amlodipine 5 mg daily     Genitourinary syndrome of menopause   Dysuria  Recent cultures with no bacterial growth despite reports of dysuria. Discharged from hospital with 5 days of amoxicillin, last day 4/1.  In TCU, PTA estradiol increased to 2 grams daily for 2 weeks on  4/3. Today, patient reports no symptoms.  -PTA macrobid 100 mg daily for prophylaxis  -continue estradiol 2 gram daily for 2 weeks then re-evaluate  -monitor for worsening signs/symptoms    Vulvovaginal candidiasis   Treated in TCU with fluconazole x 3 days (end 4/6). Today, no concerns.  - Follow clinically     MARYLU (obstructive sleep apnea)  IP neurology recommended seeing sleep neurologist to evaluate for sleep-related etiology for head drop spells.  -PTA armodafinil 300 mg daily     Major depressive disorder, remission status unspecified, unspecified whether recurrent  -duloxetine 80 mg daily  -monitor mood and behaviors     Malignant neoplasm of upper-outer quadrant of left breast in female, estrogen receptor positive (H)  Previously on Lemtrada; transitioned Briumvi in Feb 2025; next infusion due in 5 months.   -follow-up with oncology     Other specified hypothyroidism  Last TSH 1.31 (3/26/25)  -levothyroxine 112 mcg daily  -trend TSH periodically     Slow transit constipation   -Continue miralax 17 g daily  -adjust bowel meds as needed     Polypharmacy  Likely contributing to altered cognition.  -reduce polypharmacy as able     Takes dietary supplements   Clarified orders in TCU.  -calcium carbonate 500 mg daily while in TCU vs 600 mg due to no supply per pharmacy  -take cranberry supplement 125 mg daily while in TCU     Generalized muscle weakness  Secondary to diagnoses above and recent hospitalization. In TCU for rehabilitation.   -Plan for home health      Discharge Medications:  MED REC REQUIRED  Post Medication Reconciliation Status: discharge  medications reconciled and changed, per note/orders       Current Outpatient Medications   Medication Sig Dispense Refill     estradiol (ESTRACE) 0.1 MG/GM vaginal cream Place 2 g vaginally twice a week. (Patient taking differently: Place 2 g vaginally daily. For 2 weeks.) 42.5 g 4     acetaminophen (TYLENOL) 500 MG tablet Take 1-2 tablets (500-1,000 mg) by mouth every 8 hours as needed for mild pain or fever (greater than 101 degrees)       acyclovir (ZOVIRAX) 400 MG tablet TAKE 1 TABLET BY MOUTH EVERY 12 HOURS 180 tablet 2     amLODIPine (NORVASC) 5 MG tablet Take 1 tablet by mouth once daily 90 tablet 2     amoxicillin (AMOXIL) 500 MG capsule Take 1 capsule (500 mg) by mouth every 8 hours. 8 capsule      Armodafinil 200 MG TABS Take 200 mg by mouth every morning       baclofen (LIORESAL) 10 MG tablet Take 2 tablets (20 mg) in the morning and 3 tablets (30 mg) in the evening. May take one extra 20 mg dose in the afternoon as needed       Calcium Carbonate Antacid 600 MG CHEW Take 600 mg by mouth daily       CRANBERRY PO Take by mouth. Take 1 capsule once daily.       DULoxetine (CYMBALTA) 20 MG capsule TAKE 1 CAPSULE BY MOUTH ONCE DAILY ALONG  WITH  A  60  MG  TABLET  FOR  A  TOTAL  DOSE  OF  80  MG  DAILY 90 capsule 4     DULoxetine (CYMBALTA) 60 MG capsule Take 1 capsule by mouth once daily (Patient taking differently: Take 1 capsule by mouth once daily, along with 20 mg for total of 80 mg daily dose.) 90 capsule 4     famotidine (PEPCID) 40 MG tablet TAKE 1 TABLET BY MOUTH AT BEDTIME 90 tablet 2     gabapentin (NEURONTIN) 300 MG capsule Take 2 capsules (600 mg) by mouth every morning.       gabapentin (NEURONTIN) 300 MG capsule Take 900 mg by mouth at bedtime.       latanoprost (XALATAN) 0.005 % ophthalmic solution INSTILL 1 DROP INTO BOTH EYES EVERY DAY AS DIRECTED PT WILL NEED TO BE SEEN FOR FUTURE REFILLS       levothyroxine (SYNTHROID/LEVOTHROID) 112 MCG tablet Take 1 tablet (112 mcg) by mouth daily 90  "tablet 3     losartan (COZAAR) 50 MG tablet Take 1 tablet (50 mg) by mouth 2 times daily 180 tablet 4     magnesium oxide (MAG-OX) 400 MG tablet Take 400 mg by mouth daily.       Multiple Vitamins-Minerals (MULTIPLE VITAMINS/WOMENS PO) Take by mouth daily       nitroFURantoin macrocrystal-monohydrate (MACROBID) 100 MG capsule Take 1 capsule (100 mg) by mouth daily.       ondansetron (ZOFRAN ODT) 4 MG ODT tab Take 1 tablet (4 mg) by mouth every 6 hours as needed for nausea or vomiting. 12 tablet 0       Controlled medications:   not applicable/none     Past Medical History:   Past Medical History:   Diagnosis Date     Acute cystitis without hematuria      Acute pain of right knee      Atypical ductal hyperplasia of left breast 02/2022     Candida esophagitis - 2019 (H)      CKD (chronic kidney disease) stage 3, GFR 30-59 ml/min (H)      Community acquired pneumonia, unspecified laterality      Cough, unspecified type 04/05/2024     Depression      Epigastric pain      Former tobacco use      Fracture of femur, distal, left, closed (H)      Gastro-oesophageal reflux disease      Graves disease      History of fracture of fibula      History of tibial fracture      HSV (herpes simplex virus) infection      Hyperlipidemia with target LDL less than 130      Hyponatremia      L tib fx s/p IM nailing      Lung nodules     Currently managed with follow up imaging by [facility] result Team.        Multiple sclerosis (H)      Osteoporosis      Postablative hypothyroidism      Physical Exam:   Vitals: /62   Pulse 70   Temp 97.1  F (36.2  C)   Resp 18   Ht 1.727 m (5' 8\")   Wt 67.6 kg (149 lb)   LMP 01/31/2004 (Approximate)   SpO2 99%   BMI 22.66 kg/m    BMI: Body mass index is 22.66 kg/m .  GENERAL APPEARANCE:  In no distress  HEENT:  atraumatic, moist mucus membranes  RESP:  non-labored breathing, lungs clear on auscultation, no respiratory distress, no cough  CV:  Rate regular, S1 S2 noted, no " edema  ABDOMEN:  soft, non-distended, non-tender, bowel sounds active  M/S:   wheelchair bound, moves all extremities, strength and tone equal bilaterally, no calf pain, arthritic changes noted in hands  SKIN:  warm, dry, thin, fragile, no obvious rash, lesions, ulcerations or petechiae   NEURO:   Face is symmetric, examination of sensation by touch normal, slow speech  PSYCH:  comfortable, sleepy       SNF labs: Labs reviewed as per [facility] and/or Care Everywhere.      DISCHARGE PLAN:  Follow up labs: No labs orders/due  Medical Follow Up:      Follow up with primary care provider in 5-7 days  Follow up with neurology   [facility] scheduled appointments:  Appointments in Next Year      Apr 15, 2025 8:00 AM  (Arrive by 7:45 AM)  RETURN ENDOCRINE with Estela Flowers MD  [facility] ([facility] ) [phone]     Apr 16, 2025 1:40 PM  (Arrive by 1:20 PM)  Annual Wellness Visit with Carolina Pulliam MD  [facility] ([facility] ) [phone]     May 08, 2025 9:15 AM  (Arrive by 9:00 AM)  MA SCREENING BILATERAL W/ KIM with [email or web address]  [facility] [place] ([facility] ) [phone]     May 08, 2025 10:00 AM  (Arrive by 9:45 AM)  Return Patient with Kenny Martinez MD  [facility] ([facility] ) [phone]     Dec 15, 2025 8:30 AM  (Arrive by 8:15 AM)  RETURN ENDOCRINE with Neli Bean MD  [facility] [place] ([facility] ) [phone]           Discharge Services: Home Care:  Occupational Therapy, Physical Therapy, Registered Nurse, and Home Health Aide      TOTAL DISCHARGE TIME:   Greater than 30 minutes  Electronically signed by:  Dr. Jovanna Nobles, DNP, APRN, AGNP-BC,  PHN      This note was completed with the assistance of dictation software. Typos and word substitution-errors are expected and unintended.                  Sincerely,        Jovanna Nobles, SWAPNIL CNP    Electronically signed

## 2025-04-07 NOTE — PROGRESS NOTES
[facility] GERIATRICS DISCHARGE SUMMARY  PATIENT'S NAME: Marylee Woods  YOB: 1955  MEDICAL RECORD NUMBER:  [id number]  Place of Service where encounter took place:  [facility] (SNF) [[id number]]    PRIMARY CARE PROVIDER AND CLINIC RESPONSIBLE AFTER TRANSFER:   Carolina Pulliam MD, [address] / [place] [address]        Transferring providers: Jovanna Nboles DNP, Benjamin Rosenstein, MD  Recent Hospitalization/ED:  [facility] stay 3/26/2025 to 3/29/2025.  Date of SNF Admission: 3/29/2025  Date of SNF (anticipated) Discharge: 4/8/25  Discharged to: previous independent home  Cognitive Scores: Short blessed: 2/28  Physical Function: Wheelchair dependent  DME: No DME needed    CODE STATUS/ADVANCE DIRECTIVES DISCUSSION:  Full Code   ALLERGIES: Bactrim [sulfamethoxazole-trimethoprim], Hydrochlorothiazide, Sulfamethizole, Amantadine, Budesonide, and Budesonide-formoterol fumarate    NURSING FACILITY COURSE   Medication Changes/Rationale:   See below    Summary of nursing facility stay:   Marylee Woods  is a 69 year old  (1955), with PMH: multiple sclerosis wheelchair bound, malignant neoplasm of left breast s/p lumpectomy in 2022, MARYLU, HTN, anemia, and depression.  **[facility] 3/9-3/14: admitted for UTI and acute colitis due to norovirus. Treated with IV ampicillin -> PO amoxicillin and discharged back home.  **Seen by PCP on 3/20 who ordered UA/UC for concern of recurrent UTI and reported ongoing neurological changes since last hospital discharge; UC likely contaminated. On 3/24, patient reported dysuria. Patient had 10 tabs left of amoxicillin from previous UTI; PCP advised to take the amoxicillin for 5 days.   **This hospitalization: presented to ED for ongoing confusion and weakness. CRP 11. UA showed trace blood, large LE, moderate bacteria, and moderate yeast. Given IV ceftriaxone x1 dose in ED. Neurology consulted; no new  "focal findings on exam to support MS flare; weakness likely prolonged illness and deconditioning; head drop spells may be hypotension related from prolonged sitting or possible sleep-related phenomenon; consider follow-up with sleep neurologist to assess for sleep-related etiology. UC grew urogenital germaine; IV rocephin-> PO amoxicillin on 3/27; plan for 5 day amoxicillin given urinary symptoms. Discharged to this facility for medical management, rehab, and nursing care    Today:  Nursing staff reports no acute medical concerns. Patient dozes off often during the day.    Patient is seen today for a visit in her room. She is sleeping in her bed. Takes some time to awaken her. Feels \"just a mess\". \"No\" pain. She does not recall working with therapies; notes, \"I was here, I just came here from the hospital\".  Appetite is \"fine\". Endorses her bowels are moving and she has \"no\" belly pain. When asked about bladder, \"no\" dysuria, \"no\" burning sensation, \"no\" pain. Sleeping \"not well\". Denies worsening mood or depression. Denies CP, palpitations, lightheadedness, dizziness, SOB, fever, chills, nausea/vomiting concerns today. Patient dozes off frequently during visit.     ASSESSMENT/PLAN:        Episode of altered cognition  Reason for hospitalization. Etiology unclear. IP neurology consulted; less concerned of MS flare. Today, no concerns.   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 Redman from [facility]; MRI on 3/21 reportedly stable. In TCU, PRN baclofen added for reported afternoon spasms. Today, patient denies concerns.  -baclofen 20 mg qAM and baclofen 30 mg at bedtime; 20 mg daily as needed  -continue gabapentin 600 mg every morning and 900 mg at bedtime  -follow-up with neurology as recommended     Essential hypertension  //76 mmHg. 71-80 bpm.   -continue losartan 50 mg twice daily  -continue " amlodipine 5 mg daily     Genitourinary syndrome of menopause   Dysuria  Recent cultures with no bacterial growth despite reports of dysuria. Discharged from hospital with 5 days of amoxicillin, last day 4/1.  In TCU, PTA estradiol increased to 2 grams daily for 2 weeks on  4/3. Today, patient reports no symptoms.  -PTA macrobid 100 mg daily for prophylaxis  -continue estradiol 2 gram daily for 2 weeks then re-evaluate  -monitor for worsening signs/symptoms    Vulvovaginal candidiasis   Treated in TCU with fluconazole x 3 days (end 4/6). Today, no concerns.  - Follow clinically     MARYLU (obstructive sleep apnea)  IP neurology recommended seeing sleep neurologist to evaluate for sleep-related etiology for head drop spells.  -PTA armodafinil 300 mg daily     Major depressive disorder, remission status unspecified, unspecified whether recurrent  -duloxetine 80 mg daily  -monitor mood and behaviors     Malignant neoplasm of upper-outer quadrant of left breast in female, estrogen receptor positive (H)  Previously on Lemtrada; transitioned Briumvi in Feb 2025; next infusion due in 5 months.   -follow-up with oncology     Other specified hypothyroidism  Last TSH 1.31 (3/26/25)  -levothyroxine 112 mcg daily  -trend TSH periodically     Slow transit constipation   -Continue miralax 17 g daily  -adjust bowel meds as needed     Polypharmacy  Likely contributing to altered cognition.  -reduce polypharmacy as able     Takes dietary supplements   Clarified orders in TCU.  -calcium carbonate 500 mg daily while in TCU vs 600 mg due to no supply per pharmacy  -take cranberry supplement 125 mg daily while in TCU     Generalized muscle weakness  Secondary to diagnoses above and recent hospitalization. In TCU for rehabilitation.   -Plan for home health      Discharge Medications:  MED REC REQUIRED  Post Medication Reconciliation Status: discharge medications reconciled and changed, per note/orders       Current Outpatient Medications    Medication Sig Dispense Refill    estradiol (ESTRACE) 0.1 MG/GM vaginal cream Place 2 g vaginally twice a week. (Patient taking differently: Place 2 g vaginally daily. For 2 weeks.) 42.5 g 4    acetaminophen (TYLENOL) 500 MG tablet Take 1-2 tablets (500-1,000 mg) by mouth every 8 hours as needed for mild pain or fever (greater than 101 degrees)      acyclovir (ZOVIRAX) 400 MG tablet TAKE 1 TABLET BY MOUTH EVERY 12 HOURS 180 tablet 2    amLODIPine (NORVASC) 5 MG tablet Take 1 tablet by mouth once daily 90 tablet 2    amoxicillin (AMOXIL) 500 MG capsule Take 1 capsule (500 mg) by mouth every 8 hours. 8 capsule     Armodafinil 200 MG TABS Take 200 mg by mouth every morning      baclofen (LIORESAL) 10 MG tablet Take 2 tablets (20 mg) in the morning and 3 tablets (30 mg) in the evening. May take one extra 20 mg dose in the afternoon as needed      Calcium Carbonate Antacid 600 MG CHEW Take 600 mg by mouth daily      CRANBERRY PO Take by mouth. Take 1 capsule once daily.      DULoxetine (CYMBALTA) 20 MG capsule TAKE 1 CAPSULE BY MOUTH ONCE DAILY ALONG  WITH  A  60  MG  TABLET  FOR  A  TOTAL  DOSE  OF  80  MG  DAILY 90 capsule 4    DULoxetine (CYMBALTA) 60 MG capsule Take 1 capsule by mouth once daily (Patient taking differently: Take 1 capsule by mouth once daily, along with 20 mg for total of 80 mg daily dose.) 90 capsule 4    famotidine (PEPCID) 40 MG tablet TAKE 1 TABLET BY MOUTH AT BEDTIME 90 tablet 2    gabapentin (NEURONTIN) 300 MG capsule Take 2 capsules (600 mg) by mouth every morning.      gabapentin (NEURONTIN) 300 MG capsule Take 900 mg by mouth at bedtime.      latanoprost (XALATAN) 0.005 % ophthalmic solution INSTILL 1 DROP INTO BOTH EYES EVERY DAY AS DIRECTED PT WILL NEED TO BE SEEN FOR FUTURE REFILLS      levothyroxine (SYNTHROID/LEVOTHROID) 112 MCG tablet Take 1 tablet (112 mcg) by mouth daily 90 tablet 3    losartan (COZAAR) 50 MG tablet Take 1 tablet (50 mg) by mouth 2 times daily 180 tablet 4     "magnesium oxide (MAG-OX) 400 MG tablet Take 400 mg by mouth daily.      Multiple Vitamins-Minerals (MULTIPLE VITAMINS/WOMENS PO) Take by mouth daily      nitroFURantoin macrocrystal-monohydrate (MACROBID) 100 MG capsule Take 1 capsule (100 mg) by mouth daily.      ondansetron (ZOFRAN ODT) 4 MG ODT tab Take 1 tablet (4 mg) by mouth every 6 hours as needed for nausea or vomiting. 12 tablet 0       Controlled medications:   not applicable/none     Past Medical History:   Past Medical History:   Diagnosis Date    Acute cystitis without hematuria     Acute pain of right knee     Atypical ductal hyperplasia of left breast 02/2022    Candida esophagitis - 2019 (H)     CKD (chronic kidney disease) stage 3, GFR 30-59 ml/min (H)     Community acquired pneumonia, unspecified laterality     Cough, unspecified type 04/05/2024    Depression     Epigastric pain     Former tobacco use     Fracture of femur, distal, left, closed (H)     Gastro-oesophageal reflux disease     Graves disease     History of fracture of fibula     History of tibial fracture     HSV (herpes simplex virus) infection     Hyperlipidemia with target LDL less than 130     Hyponatremia     L tib fx s/p IM nailing     Lung nodules     Currently managed with follow up imaging by [facility] result Team.       Multiple sclerosis (H)     Osteoporosis     Postablative hypothyroidism      Physical Exam:   Vitals: /62   Pulse 70   Temp 97.1  F (36.2  C)   Resp 18   Ht 1.727 m (5' 8\")   Wt 67.6 kg (149 lb)   LMP 01/31/2004 (Approximate)   SpO2 99%   BMI 22.66 kg/m    BMI: Body mass index is 22.66 kg/m .  GENERAL APPEARANCE:  In no distress  HEENT:  atraumatic, moist mucus membranes  RESP:  non-labored breathing, lungs clear on auscultation, no respiratory distress, no cough  CV:  Rate regular, S1 S2 noted, no edema  ABDOMEN:  soft, non-distended, non-tender, bowel sounds active  M/S:   wheelchair bound, moves all extremities, strength and " tone equal bilaterally, no calf pain, arthritic changes noted in hands  SKIN:  warm, dry, thin, fragile, no obvious rash, lesions, ulcerations or petechiae   NEURO:   Face is symmetric, examination of sensation by touch normal, slow speech  PSYCH:  comfortable, sleepy       SNF labs: Labs reviewed as per Epic and/or Care Everywhere.      DISCHARGE PLAN:  Follow up labs: No labs orders/due  Medical Follow Up:      Follow up with primary care provider in 5-7 days  Follow up with neurology   [facility] scheduled appointments:  Appointments in Next Year      Apr 15, 2025 8:00 AM  (Arrive by 7:45 AM)  RETURN ENDOCRINE with Estela Flowers MD  [facility] [place] ([facility] ) [phone]     Apr 16, 2025 1:40 PM  (Arrive by 1:20 PM)  Annual Wellness Visit with Carolina Pulliam MD  [facility] ([facility] ) [phone]     May 08, 2025 9:15 AM  (Arrive by 9:00 AM)  MA SCREENING BILATERAL W/ KIM with [email or web address]  [facility] [place] ([facility] ) [phone]     May 08, 2025 10:00 AM  (Arrive by 9:45 AM)  Return Patient with Kenny Martinez MD  [facility] ([facility] ) [phone]     Dec 15, 2025 8:30 AM  (Arrive by 8:15 AM)  RETURN ENDOCRINE with Neli Bean MD  [facility] [place] ([facility] ) [phone]           Discharge Services: Home Care:  Occupational Therapy, Physical Therapy, Registered Nurse, and Home Health Aide      TOTAL DISCHARGE TIME:   Greater than 30 minutes  Electronically signed by:  Dr. Jovanna Nobles, DNP, APRN, AGNP-BC, PHN      This note was completed with the assistance of dictation software. Typos and word substitution-errors are expected and  unintended.

## 2025-04-08 ENCOUNTER — APPOINTMENT (OUTPATIENT)
Dept: CT IMAGING | Facility: CLINIC | Age: 70
DRG: 092 | End: 2025-04-08
Attending: INTERNAL MEDICINE
Payer: MEDICARE

## 2025-04-08 ENCOUNTER — HOSPITAL ENCOUNTER (INPATIENT)
Facility: CLINIC | Age: 70
DRG: 092 | End: 2025-04-08
Attending: INTERNAL MEDICINE | Admitting: STUDENT IN AN ORGANIZED HEALTH CARE EDUCATION/TRAINING PROGRAM
Payer: MEDICARE

## 2025-04-08 ENCOUNTER — APPOINTMENT (OUTPATIENT)
Dept: CT IMAGING | Facility: CLINIC | Age: 70
End: 2025-04-08
Attending: INTERNAL MEDICINE
Payer: MEDICARE

## 2025-04-08 DIAGNOSIS — G35 MS (MULTIPLE SCLEROSIS) (H): Primary | ICD-10-CM

## 2025-04-08 DIAGNOSIS — R40.1 STUPOR: ICD-10-CM

## 2025-04-08 DIAGNOSIS — N39.0 RECURRENT UTI: ICD-10-CM

## 2025-04-08 DIAGNOSIS — N39.0 URINARY TRACT INFECTION WITHOUT HEMATURIA, SITE UNSPECIFIED: ICD-10-CM

## 2025-04-08 LAB
ALBUMIN SERPL BCG-MCNC: 4.1 G/DL (ref 3.5–5.2)
ALBUMIN UR-MCNC: NEGATIVE MG/DL
ALP SERPL-CCNC: 93 U/L (ref 40–150)
ALT SERPL W P-5'-P-CCNC: 11 U/L (ref 0–50)
AMMONIA PLAS-SCNC: 11 UMOL/L (ref 11–51)
ANION GAP SERPL CALCULATED.3IONS-SCNC: 13 MMOL/L (ref 7–15)
APPEARANCE UR: ABNORMAL
APTT PPP: 29 SECONDS (ref 22–38)
AST SERPL W P-5'-P-CCNC: 21 U/L (ref 0–45)
BASE EXCESS BLDV CALC-SCNC: 0.4 MMOL/L (ref -3–3)
BASE EXCESS BLDV CALC-SCNC: 2 MMOL/L (ref -3–3)
BASOPHILS # BLD AUTO: 0.1 10E3/UL (ref 0–0.2)
BASOPHILS NFR BLD AUTO: 1 %
BILIRUB SERPL-MCNC: 0.2 MG/DL
BILIRUB UR QL STRIP: NEGATIVE
BUN SERPL-MCNC: 18.3 MG/DL (ref 8–23)
CALCIUM SERPL-MCNC: 9.8 MG/DL (ref 8.8–10.4)
CHLORIDE SERPL-SCNC: 103 MMOL/L (ref 98–107)
COLOR UR AUTO: ABNORMAL
CREAT BLD-MCNC: 1.3 MG/DL (ref 0.5–1)
CREAT SERPL-MCNC: 1.03 MG/DL (ref 0.51–0.95)
CRP SERPL-MCNC: 6.5 MG/L
CYSTATIN C (ROCHE): 1.8 MG/L (ref 0.6–1)
EGFRCR SERPLBLD CKD-EPI 2021: 44 ML/MIN/1.73M2
EGFRCR SERPLBLD CKD-EPI 2021: 59 ML/MIN/1.73M2
EOSINOPHIL # BLD AUTO: 0.2 10E3/UL (ref 0–0.7)
EOSINOPHIL NFR BLD AUTO: 2 %
ERYTHROCYTE [DISTWIDTH] IN BLOOD BY AUTOMATED COUNT: 13.9 % (ref 10–15)
ERYTHROCYTE [DISTWIDTH] IN BLOOD BY AUTOMATED COUNT: 14 % (ref 10–15)
ERYTHROCYTE [SEDIMENTATION RATE] IN BLOOD BY WESTERGREN METHOD: 46 MM/HR (ref 0–30)
GFR/BSA.PRED SERPLBLD CYS-BASED-ARV: 32 ML/MIN/1.73M2
GLUCOSE SERPL-MCNC: 111 MG/DL (ref 70–99)
GLUCOSE UR STRIP-MCNC: NEGATIVE MG/DL
HCO3 BLDV-SCNC: 27 MMOL/L (ref 21–28)
HCO3 BLDV-SCNC: 27 MMOL/L (ref 21–28)
HCO3 SERPL-SCNC: 25 MMOL/L (ref 22–29)
HCT VFR BLD AUTO: 36.5 % (ref 35–47)
HCT VFR BLD AUTO: 37.2 % (ref 35–47)
HGB BLD-MCNC: 11.4 G/DL (ref 11.7–15.7)
HGB BLD-MCNC: 11.9 G/DL (ref 11.7–15.7)
HGB UR QL STRIP: ABNORMAL
HYALINE CASTS: 6 /LPF
IMM GRANULOCYTES # BLD: 0 10E3/UL
IMM GRANULOCYTES NFR BLD: 0 %
INR BLD: 1.1
KETONES UR STRIP-MCNC: NEGATIVE MG/DL
LACTATE BLD-SCNC: 0.7 MMOL/L (ref 0.7–2)
LACTATE SERPL-SCNC: 1.3 MMOL/L (ref 0.7–2)
LEUKOCYTE ESTERASE UR QL STRIP: ABNORMAL
LYMPHOCYTES # BLD AUTO: 0.9 10E3/UL (ref 0.8–5.3)
LYMPHOCYTES NFR BLD AUTO: 14 %
MAGNESIUM SERPL-MCNC: 2.6 MG/DL (ref 1.7–2.3)
MCH RBC QN AUTO: 28.9 PG (ref 26.5–33)
MCH RBC QN AUTO: 29.3 PG (ref 26.5–33)
MCHC RBC AUTO-ENTMCNC: 31.2 G/DL (ref 31.5–36.5)
MCHC RBC AUTO-ENTMCNC: 32 G/DL (ref 31.5–36.5)
MCV RBC AUTO: 92 FL (ref 78–100)
MCV RBC AUTO: 93 FL (ref 78–100)
MONOCYTES # BLD AUTO: 0.4 10E3/UL (ref 0–1.3)
MONOCYTES NFR BLD AUTO: 6 %
NEUTROPHILS # BLD AUTO: 5.1 10E3/UL (ref 1.6–8.3)
NEUTROPHILS NFR BLD AUTO: 77 %
NITRATE UR QL: NEGATIVE
NRBC # BLD AUTO: 0 10E3/UL
NRBC BLD AUTO-RTO: 0 /100
O2/TOTAL GAS SETTING VFR VENT: 21 %
OXYHGB MFR BLDV: 77 % (ref 70–75)
PCO2 BLDV: 42 MM HG (ref 40–50)
PCO2 BLDV: 52 MM HG (ref 40–50)
PH BLDV: 7.33 [PH] (ref 7.32–7.43)
PH BLDV: 7.41 [PH] (ref 7.32–7.43)
PH UR STRIP: 8 [PH] (ref 5–7)
PLATELET # BLD AUTO: 369 10E3/UL (ref 150–450)
PLATELET # BLD AUTO: 380 10E3/UL (ref 150–450)
PO2 BLDV: 46 MM HG (ref 25–47)
PO2 BLDV: 46 MM HG (ref 25–47)
POTASSIUM SERPL-SCNC: 4.6 MMOL/L (ref 3.4–5.3)
PROCALCITONIN SERPL IA-MCNC: 0.12 NG/ML
PROT SERPL-MCNC: 7.9 G/DL (ref 6.4–8.3)
RBC # BLD AUTO: 3.94 10E6/UL (ref 3.8–5.2)
RBC # BLD AUTO: 4.06 10E6/UL (ref 3.8–5.2)
RBC URINE: 2 /HPF
SAO2 % BLDV: 78.1 % (ref 70–75)
SAO2 % BLDV: 82 % (ref 70–75)
SODIUM SERPL-SCNC: 141 MMOL/L (ref 135–145)
SP GR UR STRIP: 1.02 (ref 1–1.03)
SQUAMOUS EPITHELIAL: 40 /HPF
T4 FREE SERPL-MCNC: 1.08 NG/DL (ref 0.9–1.7)
TRANSITIONAL EPI: <1 /HPF
TSH SERPL DL<=0.005 MIU/L-ACNC: 4.45 UIU/ML (ref 0.3–4.2)
UROBILINOGEN UR STRIP-MCNC: NORMAL MG/DL
WBC # BLD AUTO: 12.4 10E3/UL (ref 4–11)
WBC # BLD AUTO: 6.6 10E3/UL (ref 4–11)
WBC URINE: 71 /HPF

## 2025-04-08 PROCEDURE — 85730 THROMBOPLASTIN TIME PARTIAL: CPT | Performed by: INTERNAL MEDICINE

## 2025-04-08 PROCEDURE — 250N000011 HC RX IP 250 OP 636

## 2025-04-08 PROCEDURE — 85004 AUTOMATED DIFF WBC COUNT: CPT | Performed by: INTERNAL MEDICINE

## 2025-04-08 PROCEDURE — 120N000002 HC R&B MED SURG/OB UMMC

## 2025-04-08 PROCEDURE — 99291 CRITICAL CARE FIRST HOUR: CPT | Mod: GC | Performed by: STUDENT IN AN ORGANIZED HEALTH CARE EDUCATION/TRAINING PROGRAM

## 2025-04-08 PROCEDURE — 99291 CRITICAL CARE FIRST HOUR: CPT | Mod: 25 | Performed by: INTERNAL MEDICINE

## 2025-04-08 PROCEDURE — 85652 RBC SED RATE AUTOMATED: CPT | Performed by: INTERNAL MEDICINE

## 2025-04-08 PROCEDURE — 82805 BLOOD GASES W/O2 SATURATION: CPT | Performed by: INTERNAL MEDICINE

## 2025-04-08 PROCEDURE — 82565 ASSAY OF CREATININE: CPT

## 2025-04-08 PROCEDURE — 82310 ASSAY OF CALCIUM: CPT | Performed by: INTERNAL MEDICINE

## 2025-04-08 PROCEDURE — 70496 CT ANGIOGRAPHY HEAD: CPT | Mod: 26 | Performed by: RADIOLOGY

## 2025-04-08 PROCEDURE — 82610 CYSTATIN C: CPT

## 2025-04-08 PROCEDURE — 36415 COLL VENOUS BLD VENIPUNCTURE: CPT | Performed by: INTERNAL MEDICINE

## 2025-04-08 PROCEDURE — 70450 CT HEAD/BRAIN W/O DYE: CPT

## 2025-04-08 PROCEDURE — 99222 1ST HOSP IP/OBS MODERATE 55: CPT | Mod: AI | Performed by: INTERNAL MEDICINE

## 2025-04-08 PROCEDURE — 250N000011 HC RX IP 250 OP 636: Performed by: INTERNAL MEDICINE

## 2025-04-08 PROCEDURE — 81003 URINALYSIS AUTO W/O SCOPE: CPT | Performed by: INTERNAL MEDICINE

## 2025-04-08 PROCEDURE — 83605 ASSAY OF LACTIC ACID: CPT | Performed by: INTERNAL MEDICINE

## 2025-04-08 PROCEDURE — 83735 ASSAY OF MAGNESIUM: CPT | Performed by: INTERNAL MEDICINE

## 2025-04-08 PROCEDURE — 84443 ASSAY THYROID STIM HORMONE: CPT | Performed by: INTERNAL MEDICINE

## 2025-04-08 PROCEDURE — 70496 CT ANGIOGRAPHY HEAD: CPT

## 2025-04-08 PROCEDURE — 87186 SC STD MICRODIL/AGAR DIL: CPT | Performed by: INTERNAL MEDICINE

## 2025-04-08 PROCEDURE — 96365 THER/PROPH/DIAG IV INF INIT: CPT | Performed by: INTERNAL MEDICINE

## 2025-04-08 PROCEDURE — 84145 PROCALCITONIN (PCT): CPT | Performed by: INTERNAL MEDICINE

## 2025-04-08 PROCEDURE — 999N000127 HC STATISTIC PERIPHERAL IV START W US GUIDANCE

## 2025-04-08 PROCEDURE — 250N000013 HC RX MED GY IP 250 OP 250 PS 637: Performed by: INTERNAL MEDICINE

## 2025-04-08 PROCEDURE — 250N000013 HC RX MED GY IP 250 OP 250 PS 637

## 2025-04-08 PROCEDURE — 999N000187 HC STATISTIC TRAUMA CODE W/ ACCESS

## 2025-04-08 PROCEDURE — 999N000285 HC STATISTIC VASC ACCESS LAB DRAW WITH PIV START

## 2025-04-08 PROCEDURE — 99291 CRITICAL CARE FIRST HOUR: CPT | Performed by: INTERNAL MEDICINE

## 2025-04-08 PROCEDURE — 87040 BLOOD CULTURE FOR BACTERIA: CPT | Performed by: INTERNAL MEDICINE

## 2025-04-08 PROCEDURE — 85610 PROTHROMBIN TIME: CPT

## 2025-04-08 PROCEDURE — 70498 CT ANGIOGRAPHY NECK: CPT | Mod: 26 | Performed by: RADIOLOGY

## 2025-04-08 PROCEDURE — 83605 ASSAY OF LACTIC ACID: CPT

## 2025-04-08 PROCEDURE — 84439 ASSAY OF FREE THYROXINE: CPT | Performed by: INTERNAL MEDICINE

## 2025-04-08 PROCEDURE — 36415 COLL VENOUS BLD VENIPUNCTURE: CPT

## 2025-04-08 PROCEDURE — 82140 ASSAY OF AMMONIA: CPT | Performed by: INTERNAL MEDICINE

## 2025-04-08 PROCEDURE — 86140 C-REACTIVE PROTEIN: CPT | Performed by: INTERNAL MEDICINE

## 2025-04-08 PROCEDURE — 85027 COMPLETE CBC AUTOMATED: CPT

## 2025-04-08 RX ORDER — GABAPENTIN 300 MG/1
600 CAPSULE ORAL EVERY MORNING
Status: CANCELLED | OUTPATIENT
Start: 2025-04-09

## 2025-04-08 RX ORDER — BACLOFEN 10 MG/1
30 TABLET ORAL EVERY EVENING
Status: DISCONTINUED | OUTPATIENT
Start: 2025-04-08 | End: 2025-04-10

## 2025-04-08 RX ORDER — GABAPENTIN 300 MG/1
600 CAPSULE ORAL EVERY MORNING
Status: DISCONTINUED | OUTPATIENT
Start: 2025-04-09 | End: 2025-04-16 | Stop reason: HOSPADM

## 2025-04-08 RX ORDER — BACLOFEN 20 MG/1
20 TABLET ORAL 2 TIMES DAILY
Status: DISCONTINUED | OUTPATIENT
Start: 2025-04-08 | End: 2025-04-08

## 2025-04-08 RX ORDER — BACLOFEN 10 MG/1
20 TABLET ORAL DAILY
Status: DISCONTINUED | OUTPATIENT
Start: 2025-04-09 | End: 2025-04-10

## 2025-04-08 RX ORDER — ACYCLOVIR 400 MG/1
400 TABLET ORAL 2 TIMES DAILY
Status: DISCONTINUED | OUTPATIENT
Start: 2025-04-08 | End: 2025-04-16 | Stop reason: HOSPADM

## 2025-04-08 RX ORDER — ONDANSETRON 4 MG/1
4 TABLET, ORALLY DISINTEGRATING ORAL EVERY 6 HOURS PRN
Status: DISCONTINUED | OUTPATIENT
Start: 2025-04-08 | End: 2025-04-16 | Stop reason: HOSPADM

## 2025-04-08 RX ORDER — HEPARIN SODIUM 5000 [USP'U]/.5ML
5000 INJECTION, SOLUTION INTRAVENOUS; SUBCUTANEOUS EVERY 8 HOURS SCHEDULED
Status: DISCONTINUED | OUTPATIENT
Start: 2025-04-08 | End: 2025-04-16 | Stop reason: HOSPADM

## 2025-04-08 RX ORDER — LOSARTAN POTASSIUM 50 MG/1
50 TABLET ORAL 2 TIMES DAILY
Status: DISCONTINUED | OUTPATIENT
Start: 2025-04-08 | End: 2025-04-16 | Stop reason: HOSPADM

## 2025-04-08 RX ORDER — LATANOPROST 50 UG/ML
1 SOLUTION/ DROPS OPHTHALMIC AT BEDTIME
Status: DISCONTINUED | OUTPATIENT
Start: 2025-04-08 | End: 2025-04-16 | Stop reason: HOSPADM

## 2025-04-08 RX ORDER — CALCIUM CARBONATE 500 MG/1
500 TABLET, CHEWABLE ORAL DAILY
Status: DISCONTINUED | OUTPATIENT
Start: 2025-04-09 | End: 2025-04-16 | Stop reason: HOSPADM

## 2025-04-08 RX ORDER — GABAPENTIN 300 MG/1
900 CAPSULE ORAL AT BEDTIME
Status: DISCONTINUED | OUTPATIENT
Start: 2025-04-08 | End: 2025-04-16 | Stop reason: HOSPADM

## 2025-04-08 RX ORDER — FAMOTIDINE 20 MG/1
20 TABLET, FILM COATED ORAL AT BEDTIME
Status: DISCONTINUED | OUTPATIENT
Start: 2025-04-08 | End: 2025-04-16 | Stop reason: HOSPADM

## 2025-04-08 RX ORDER — AMLODIPINE BESYLATE 5 MG/1
5 TABLET ORAL DAILY
Status: DISCONTINUED | OUTPATIENT
Start: 2025-04-09 | End: 2025-04-16 | Stop reason: HOSPADM

## 2025-04-08 RX ORDER — GABAPENTIN 300 MG/1
600 CAPSULE ORAL AT BEDTIME
Status: DISCONTINUED | OUTPATIENT
Start: 2025-04-08 | End: 2025-04-08

## 2025-04-08 RX ORDER — LEVOTHYROXINE SODIUM 112 UG/1
112 TABLET ORAL DAILY
Status: DISCONTINUED | OUTPATIENT
Start: 2025-04-09 | End: 2025-04-16 | Stop reason: HOSPADM

## 2025-04-08 RX ORDER — GABAPENTIN 300 MG/1
900 CAPSULE ORAL AT BEDTIME
Status: CANCELLED | OUTPATIENT
Start: 2025-04-08

## 2025-04-08 RX ORDER — GABAPENTIN 300 MG/1
900 CAPSULE ORAL AT BEDTIME
Status: DISCONTINUED | OUTPATIENT
Start: 2025-04-08 | End: 2025-04-08

## 2025-04-08 RX ORDER — PIPERACILLIN SODIUM, TAZOBACTAM SODIUM 3; .375 G/15ML; G/15ML
3.38 INJECTION, POWDER, LYOPHILIZED, FOR SOLUTION INTRAVENOUS EVERY 6 HOURS
Status: DISCONTINUED | OUTPATIENT
Start: 2025-04-08 | End: 2025-04-09

## 2025-04-08 RX ORDER — IOPAMIDOL 755 MG/ML
67 INJECTION, SOLUTION INTRAVASCULAR ONCE
Status: COMPLETED | OUTPATIENT
Start: 2025-04-08 | End: 2025-04-08

## 2025-04-08 RX ORDER — ARMODAFINIL 50 MG/1
200 TABLET ORAL EVERY MORNING
Status: DISCONTINUED | OUTPATIENT
Start: 2025-04-09 | End: 2025-04-16 | Stop reason: HOSPADM

## 2025-04-08 RX ORDER — MAGNESIUM OXIDE 400 MG/1
400 TABLET ORAL DAILY
Status: DISCONTINUED | OUTPATIENT
Start: 2025-04-08 | End: 2025-04-16 | Stop reason: HOSPADM

## 2025-04-08 RX ADMIN — BACLOFEN 30 MG: 20 TABLET ORAL at 21:14

## 2025-04-08 RX ADMIN — PIPERACILLIN AND TAZOBACTAM 3.38 G: 3; .375 INJECTION, POWDER, LYOPHILIZED, FOR SOLUTION INTRAVENOUS at 13:26

## 2025-04-08 RX ADMIN — FAMOTIDINE 20 MG: 20 TABLET, FILM COATED ORAL at 21:15

## 2025-04-08 RX ADMIN — ACYCLOVIR 400 MG: 400 TABLET ORAL at 21:15

## 2025-04-08 RX ADMIN — LATANOPROST 1 DROP: 50 SOLUTION OPHTHALMIC at 21:15

## 2025-04-08 RX ADMIN — LOSARTAN POTASSIUM 50 MG: 50 TABLET, FILM COATED ORAL at 21:15

## 2025-04-08 RX ADMIN — PIPERACILLIN AND TAZOBACTAM 3.38 G: 3; .375 INJECTION, POWDER, LYOPHILIZED, FOR SOLUTION INTRAVENOUS at 21:16

## 2025-04-08 RX ADMIN — IOPAMIDOL 67 ML: 755 INJECTION, SOLUTION INTRAVENOUS at 09:34

## 2025-04-08 ASSESSMENT — ACTIVITIES OF DAILY LIVING (ADL)
ADLS_ACUITY_SCORE: 67

## 2025-04-08 ASSESSMENT — COLUMBIA-SUICIDE SEVERITY RATING SCALE - C-SSRS: IS THE PATIENT NOT ABLE TO COMPLETE C-SSRS: UNABLE TO VERBALIZE

## 2025-04-08 NOTE — ED TRIAGE NOTES
BIBA from rehab facility where patient is currently staying. Staff  went into patients room to try to wake her up and found patient to be difficult to wake. Called 911, who noted patient to have a facial droop.      Triage Assessment (Adult)       Row Name 04/08/25 0921          Triage Assessment    Airway WDL WDL        Respiratory WDL    Respiratory WDL WDL        Skin Circulation/Temperature WDL    Skin Circulation/Temperature WDL WDL        Cardiac WDL    Cardiac WDL WDL        Peripheral/Neurovascular WDL    Peripheral Neurovascular WDL WDL        Cognitive/Neuro/Behavioral WDL    Cognitive/Neuro/Behavioral WDL X

## 2025-04-08 NOTE — PHARMACY-CONSULT NOTE
Pharmacy Stroke Code Response    Pharmacist responded as part of the Stroke Code Team activation to patient care area ED01. The Stroke Team determined that the patient was not a candidate for IV thrombolytic therapy and the pharmacy team was dismissed at 931.    JEFE HEARN RPH

## 2025-04-08 NOTE — PROGRESS NOTES
[facility]     Stroke Code Note          History of Present Illness     Chief Complaint: Stroke Symptoms and Altered Mental Status      Marylee Woods is a 69 year old female with past medical history of multiple sclerosis (wheelchair bound), malignant neoplasm of left breast s/p lumpectomy in 2022, MARYLU, HTN, anemia, and depression. Presented from TCU after staff were unable to arouse, altered mental status since 9:30 am 4/8 although was last known well on the night of 4/7. Contacted TCU who states that morning of 4/7, patient was feeling tired which she attributed to being up since 0600, however still awake and alert, speaking with staff and interactive. Took morning medications without issue however did not participate in therapies because of tiredness. Later that afternoon, patient became more somnolent, less responsive so EMS was called, however patient was able to arouse and vitally stable so was not taken to ED. The following morning, staff attempted to wake the patient around 0930 and she was not responding. Also noted to be leaning to the right. At this time, EMS was called and she was brought to ED where a stroke code was initiated.    On initial exam, patient was lying in hospital bed, eyes closed minimally responsive to surroundings without eye opening. Did have grimace to sternal rub, one instance of spontaneous eye movement, and withdrawal to pain in BLE albeit slowed. She received NIH 21 for decreased LoC, not answering questions correctly, not following both commands, no blink to threat on left, no effort against gravity in BLE or BUE, and no usable speech or auditory comprehension. Suspect exam is confounded by encephalopathy. Was subsequently taken to imaging where CTH was negative for acute bleed, and CTA head and neck negative for LVO.       Patient came to ED from TCU, where she had was admitted since 3/29/2025 due to ongoing UTI symptoms along  with intermittent confusion and weakness. Recent cultures show no bacterial growth despite symtpoms. Patient has been taking multiple antibiotics. Discharged from [facility] on 3/14 after being admitted for UTI and acute colitis due to norovirus. Patient started baclofen in TCU for afternoon spasms. Patient also taking gabapentin bid (600mg+900mg)    Baseline: patient is verbal, typically fatigued, and wheelchair bound. Weakness and numbness in lower extremities due to MS. Numbness  Tolerates regular diet. Takes whole pills.  reports that patient had been less interactive before (in the past six weeks) but would rest and recover to her baseline the next day. This time, the  reports that the patient was not responding at all.           Past Medical History     Stroke risk factors: Hypertension, MARYLU, Anemia, multiple sclerosis, history of malignancy     Preadmission antithrombotic regimen: None    Modified McCook Score (Pre-morbid)  4-Moderately severe disability; unable to walk without assistance and unable to attend to own bodily                   Assessment and Plan       1.  Stroke is less concerning given negative CT head and CTA imaging, as well as the patient's presentation following an encephalopathic pattern. Progressive MS also possible and should be kept in differentials. Toxic metabolic work up is indicated due to possible encephalopathy. Urinalysis indicated due to AMS presentation along with recent strong history of recurrent UTIs. MRI with and without contrast can rule out stroke and also provide insight into possible MS development. Of note, patient takes Baclofen twice daily, last dose yesterday morning 4/7.     Differentials currently include: Toxic Metabolic Encephalopathy,Progression of Multiple Sclerosis , Ischemic Stroke     Intravenous Thrombolysis  Not given due to:   - unclear or unfavorable risk-benefit profile for extended window thrombolysis beyond the conventional 4.5 hour  time window     Endovascular Treatment  Not initiated due to absence of proximal vessel occlusion     Plan:    - Toxic metabolic work up   - Brain MRI with and without contrast   - Urinalysis  - If stoke is confirmed: stroke work up will be started       ___________________________________________________________________    The patient was discussed with  Stroke Staff Dr. Nunez.    Latia Mahan, DO  Neurology Resident  Pager:  [phone]  ___________________________________________________________________    Beth COLON, am serving as a scribe at 12:48 pm on 8th April, 2025 to document services personally performed by Latia Mahan based on my observations and the provider's statements to me.       Imaging/Labs   (personally reviewed )    CT head: No acute intracranial hemorrhage   CTA head/neck: No stenosis of major intracranial vessels           Physical Examination     BP: (!) 152/74   Pulse: 90   Resp: 16   Temp: 98.3  F (36.8  C)   Temp src: Axillary   SpO2: 100 %   O2 Device: None (Room air)   Weight: 70.6 kg (155 lb 11.2 oz)    Wt Readings from Last 2 Encounters:   04/08/25 70.6 kg (155 lb 11.2 oz)   04/07/25 67.6 kg (149 lb)           Neuro Exam  Mental Status:   Grimace with sternal rub, minimal spontaneous eye opening verbal, or motor response, protrudes tongue when prompted otherwise does not follow commands, no intelligible speech to assess dysarthria  Cranial Nerves:  blinks to threat on right but not on left, PERRl, does not track to voice, VOR absent, facial movements symmetric, unable to assess facial sensation due to encephalopathy, not consistently following commands,, tongue protrusion midline  Motor:  normal muscle tone and bulk, No effort against gravity in all four limbs, all for limbs fall to bed immediately   Reflexes:  4 beats left ankle clonus, no clonus on right  Sensory:  slow withdrawal to pain in BLE, no withdrawal to pain in BUE (does not react to IV pokes)   Coordination:    unable to assess due to mental status, not following commands   Station/Gait:  deferred        Stroke Scales        [employer] Stroke Scale  Exam Interval: Baseline   Score    Level of consciousness: (2)   Not alert; repeat stim to attend strong stim/pain to move    LOC questions: (2)   Answers neither question correctly    LOC commands: (1)   Performs one task correctly    Best gaze: (0)   Normal    Visual: (1)   Partial hemianopia    Facial palsy: (0)   Normal symmetrical movements    Motor arm (left): (3)   No effort against gravity    Motor arm (right): (3)   No effort against gravity    Motor leg (left): (3)   No effort against gravity    Motor leg (right): (3)   No effort against gravity    Limb ataxia: (0)   Absent    Sensory: (0)   Normal- no sensory loss    Best language: (3)   Mute- global aphasia    Dysarthria: (0)   Normal    Extinction and inattention: (0)   No abnormality        Total Score:  21 (4/8/2025) at 0930                 Labs     CBC  Lab Results   Component Value Date    HGB 11.4 (L) 04/08/2025    HCT 36.5 04/08/2025    WBC 6.6 04/08/2025     04/08/2025       BMP  Lab Results   Component Value Date     04/08/2025    POTASSIUM 4.6 04/08/2025    CHLORIDE 103 04/08/2025    CO2 25 04/08/2025    BUN 18.3 04/08/2025    CR 1.3 (H) 04/08/2025     (H) 04/08/2025    JORDON 9.8 04/08/2025       INR  INR   Date Value Ref Range Status   11/19/2023 1.17 (H) 0.85 - 1.15 Final   11/17/2023 1.26 (H) 0.85 - 1.15 Final     INR POCT   Date Value Ref Range Status   04/08/2025 1.1 0.9 - 1.3 Final       Data   Stroke Code Data  (for stroke code without tele)  Stroke code activated 04/08/25  0920   First stroke provider response 04/08/25  0923   Last known normal 04/07/25  0935   Time of discovery (or onset of symptoms) 04/08/25  0900   Head CT read by Stroke Neuro Provider 04/08/25  0940   Was stroke code de-escalated? Yes  04/08/25  0945        Clinically Significant Risk  Factors Present on Admission                  # Acute Kidney Injury, unspecified: based on a >150% or 0.3 mg/dL increase in last creatinine compared to past 90 day average, will monitor renal function  # Hypertension: Noted on problem list               # Financial/Environmental Concerns:        # CKD, Stage 3 (unspecified if a or b) (GFR 30 - 59): Will monitor and treat as appropriate  - last Cr =  1.3 mg/dL (Ref range: 0.5 - 1.0 mg/dL)  - last GFR = 44 mL/min/1.73m2 (Ref range: >60 mL/min/1.73m2)       Time Spent on this Encounter   Billing:

## 2025-04-08 NOTE — ED PROVIDER NOTES
"    [place] EMERGENCY DEPARTMENT ([facility])    4/08/25       ED PROVIDER NOTE     History     Chief Complaint   Patient presents with    Stroke Symptoms    Altered Mental Status     The history is provided by medical records and the EMS personnel. The history is limited by the condition of the patient.     Marylee Woods is a 69 year old female with history of multiple sclerosis with spastic paraplegia (uses wheelchair at baseline, follows with Dr. Redman and receives Briumvi infusions as outpatient), obstructive sleep apnea, hypertension, anemia, and depression who presents to the Emergency Department via [place] [facility] with altered mental status and EMS concerns for possible stroke symptoms. History provided entirely by medics and transfer packet, patient unable to provide any history. Paramedics were called by [facility] rehab staff. Plan was made for patient to be discharged from TCU today. However when staff went to wake her up this morning, she appeared to be in deep sleep and was not rousing. Medics had difficulty rousing patient as well  but did get her to open her eyes. She appeared to have a right sided facial droop and nonverbal. Normally she is able to speak in full sentences and communicate without issues.   She was transported as a red patient for possible stroke symptoms within past 24 hours. Her last known well time was 9AM yesterday. Prehospital glucose 120. Not on anticoagulation, no known trauma. Vital signs stable. Here patient noncontributory to history and resists exam.     Epic records reviewed. Seen by geriatrics NP in rehab yesterday morning in her room, nurses had noted that she \"dozes off often during the day.\" NP noted yesterday that \"Patient dozes off frequently during visit.\" It took some time for patient to awaken, patient did report feeling \"just a mess\" but denied any pain complaints. POLST dated 3/31/2025 lists patient as full code.    Recent timeline: " "  3/9-3/14/25: Hospitalized for UTI and acute colitis due to Norovirus. She was treated with IV Ampicillin -> PO Amoxicillin and discharged home.     She was doing well for a few days after discharge but then started to have some intermittent confusion and description of \"fullness\" in the back of her head. She has had intermittent head drop and was dropping objects, appeared spacey at times.     3/20/25 UA done via PCP for dysuria which was contaminated    3/21/25 outpatient MRI brain without contrast showed non specific white matter lesions, patient states results were faxed to her neurologist but she did not hear back from them.     3/24/25 Started Amoxicillin for UTI symptoms.     3/26/2025 - 3/29/2025 Hospitalized at [facility] for UTI and confusion. Was treated with IV Rocephin on admission, changed to PO Amoxicillin on 3/27 to complete 5-7 day course given her urinary symptoms. During her hospitalization she did have confusion. Neurology was consulted, symptoms not felt to be consistent with MS flare.     3/29/25 - 4/8/25 Underwent rehab at [facility].     4/7/25 Seen by geriatrics NP in rehab yesterday morning in her room, nurses had noted that she \"dozes off often during the day.\" NP noted yesterday that \"Patient dozes off frequently during visit.\" It took some time for patient to awaken, patient did report feeling \"just a mess\" but denied any pain complaints.      Past Medical and Surgical History, Medications, Allergies, and Social History were reviewed in the electronic medical record. Review with the patient was attempted but limited due to altered mental status.   A complete review of systems was attempted but limited due to altered mental status.    Physical Exam   BP: (!) 147/110  Pulse: 81  Temp: 98.3  F (36.8  C)  Resp: 16  Weight: 70.6 kg (155 lb 11.2 oz)  SpO2: 100 %    Physical Exam  Vitals and nursing note reviewed.   Constitutional:       General: She is not in acute " distress.     Appearance: She is not toxic-appearing or diaphoretic.      Comments: Obtunded and not following commands.   HENT:      Head: Atraumatic.      Mouth/Throat:      Mouth: Mucous membranes are moist.   Eyes:      General: No scleral icterus.     Comments: Resisting manual opening of eyelids and mouth but eventually opened them spontaneously. Pupils midrange and post surgical.    Cardiovascular:      Rate and Rhythm: Normal rate and regular rhythm.   Pulmonary:      Effort: Pulmonary effort is normal. No respiratory distress.      Breath sounds: No stridor. No wheezing.   Abdominal:      Palpations: Abdomen is soft.      Tenderness: There is no guarding.   Skin:     General: Skin is warm and dry.   Neurological:      General: No focal deficit present.      Mental Status: She is lethargic and confused.      Cranial Nerves: No facial asymmetry.      Comments: resisting manual opening of eyelids but was able to open. has pupils midrange and post surgical. Resisting mouth opening, did not cooperate with commands or exam. Neuro at bedside doing more extensive neuro exam.       ED Course, Procedures, & Data     ED Course as of 04/08/25 1758 Tue Apr 08, 2025   0923 Stroke team arrived   0943 Stroke team viewed CT scans, no evidence of stroke    0955 Stroke team provided recommendations.      Procedures           IV Antibiotics given and/or elevated Lactate of 1.3 and no sepsis note found - Delete this reminder and enter the sepsis note or '.edcms' before signing chart.>>>     Results for orders placed or performed during the hospital encounter of 04/08/25   Head CT w/o contrast     Status: None    Narrative    CT HEAD W/O CONTRAST 4/8/2025 9:59 AM    History:  stroke?     Comparison: MRI 3/21/2025     Technique: Using multidetector thin collimation helical acquisition  technique, axial, coronal and sagittal CT images from the skull base  to the vertex were obtained without intravenous contrast.     Findings:  There is no intracranial hemorrhage, mass effect or midline  shift. There is no focal loss of gray-white matter differentiation,  insular ribbon sign, or focally hyperdense artery to suggest acute  infarction. The ventricles are proportionate to the cerebral sulci.  Generalized parenchymal volume loss. Periventricular and subcortical  white matter hypodensities, likely due to chronic small vessel  ischemic disease.    The bony calvaria and the bones of the skull base appear normal. The  visualized portions of the paranasal sinuses are clear. Chronic  dependent right mastoid effusion.      Impression    Impression:   1. No acute intracranial hemorrhage.  2. Mild to moderate parenchymal volume loss and sequelae of chronic  small vessel ischemic disease.     [Stroke Code Result: Negative]    Finding was identified on 4/8/2025 10:01 AM.     Dr. Mahan was contacted by Dr. Thorpe on 4/8/2025 10:22 AM via  iRule and responded with understanding of results.    [facility] Stroke Code Communication Guidelines:  [place] ED: [phone] (EB ED)  [place] Inpatient: [phone] (Resident Pager)   or iRule 'Stroke First Call Resident [[place]]' or Amcom 0979  [facility] ED: [phone] ([facility] ED)  [facility] Inpatient: Page [phone]     I have personally reviewed the examination and initial interpretation  and I agree with the findings.    ESTEFANI COEL MD         SYSTEM ID:  [id number]   CTA Head Neck with Contrast     Status: None    Narrative    EXAM: CTA HEAD NECK W CONTRAST  4/8/2025 10:11 AM     HISTORY:  stroke?       COMPARISON:  MRI 3/21/2025    TECHNIQUE:  HEAD and NECK CTA: During rapid bolus intravenous injection of  nonionic contrast material, axial images were obtained using thin  collimation multidetector helical technique from the base of the upper  aortic arch through the cranial vertex. This CT angiogram data was  reconstructed at thin intervals with mild overlap. Images were sent to  the 3D workstation, and 3D  reconstructions were obtained. The axial  source images, multiplanar reformations, 3D reconstructions in both  maximum intensity projection display and volume rendered models were  reviewed, with reconstructions performed by the technologist.    CONTRAST: iopamidol (ISOVUE-370) solution 67 mL    FINDINGS:  Head CTA demonstrates no large vessel arterial occlusion or stenosis  of the major intracranial arteries. Saccular aneurysm arising from the  distal cavernous right internal carotid artery measuring 3 x 3 mm and  projecting posteromedially (series 5, image 312).    Neck CTA demonstrates patent great vessel origins arising from aortic  arch. Minimal atherosclerotic plaque of the bilateral carotid bulbs  without associated stenosis. No vertebral artery stenosis.    No acute finding in the visualized neck soft tissues or in the  superior mediastinum/thorax.      Impression    IMPRESSION:    1. Head CTA demonstrates no stenosis of the major intracranial  arteries. 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  2. Neck CTA demonstrates no stenosis of the major cervical arteries    I have personally reviewed the examination and initial interpretation  and I agree with the findings.    ESTEFANI COLE MD         SYSTEM ID:  [id number]   [place] Draw     Status: None (In process)    Narrative    The following orders were created for panel order [place] Draw.  Procedure                               Abnormality         Status                     ---------                               -----------         ------                     Extra Blue Top Tube[[id number]]                             In process                 Extra Red Top Tube[[id number]]                              In process                 Extra Green Top (Lithiu...[[id number]]                      In process                 Extra Purple Top Tube[[id number]]                           In process                   Please view  results for these tests on the individual orders.   Creatinine POCT     Status: Abnormal   Result Value Ref Range    Creatinine POCT 1.3 (H) 0.5 - 1.0 mg/dL    GFR, ESTIMATED POCT 44 (L) >60 mL/min/1.73m2    Narrative    Reference intervals for this test were updated on 03/10/2025 to standardize reference intervals for creatinine measured by different instruments. There may be differences in the flagging of prior results with similar values performed with this method. Those prior results can be interpreted in the context of the updated reference intervals.   iStat INR, POCT     Status: Normal   Result Value Ref Range    INR POCT 1.1 0.9 - 1.3    Narrative    Effective 11/13/2024, the abnormal flagging for this assay has changed to alert for values below or above the reference range for the patient population. Previous tests were flagged below or above the therapeutic level, so there may be differences in the flagging of prior results with similar values performed with this method.   Comprehensive metabolic panel     Status: Abnormal   Result Value Ref Range    Sodium 141 135 - 145 mmol/L    Potassium 4.6 3.4 - 5.3 mmol/L    Carbon Dioxide (CO2) 25 22 - 29 mmol/L    Anion Gap 13 7 - 15 mmol/L    Urea Nitrogen 18.3 8.0 - 23.0 mg/dL    Creatinine 1.03 (H) 0.51 - 0.95 mg/dL    GFR Estimate 59 (L) >60 mL/min/1.73m2    Calcium 9.8 8.8 - 10.4 mg/dL    Chloride 103 98 - 107 mmol/L    Glucose 111 (H) 70 - 99 mg/dL    Alkaline Phosphatase 93 40 - 150 U/L    AST 21 0 - 45 U/L    ALT 11 0 - 50 U/L    Protein Total 7.9 6.4 - 8.3 g/dL    Albumin 4.1 3.5 - 5.2 g/dL    Bilirubin Total 0.2 <=1.2 mg/dL   Lactic acid whole blood with 1x repeat in 2 hr when >2     Status: Normal   Result Value Ref Range    Lactic Acid, Initial 1.3 0.7 - 2.0 mmol/L   Partial thromboplastin time     Status: Normal   Result Value Ref Range    aPTT 29 22 - 38 Seconds   Procalcitonin     Status: Normal   Result Value Ref Range    Procalcitonin 0.12 <0.50  ng/mL   Magnesium     Status: Abnormal   Result Value Ref Range    Magnesium 2.6 (H) 1.7 - 2.3 mg/dL   CRP inflammation     Status: Abnormal   Result Value Ref Range    CRP Inflammation 6.50 (H) <5.00 mg/L   Erythrocyte sedimentation rate auto     Status: Abnormal   Result Value Ref Range    Erythrocyte Sedimentation Rate 46 (H) 0 - 30 mm/hr   UA with Microscopic reflex to Culture     Status: Abnormal    Specimen: Urine, Catheter   Result Value Ref Range    Color Urine Light Yellow Colorless, Straw, Light Yellow, Yellow    Appearance Urine Slightly Cloudy (A) Clear    Glucose Urine Negative Negative mg/dL    Bilirubin Urine Negative Negative    Ketones Urine Negative Negative mg/dL    Specific Gravity Urine 1.024 1.003 - 1.035    Blood Urine Trace (A) Negative    pH Urine 8.0 (H) 5.0 - 7.0    Protein Albumin Urine Negative Negative mg/dL    Urobilinogen Urine Normal Normal mg/dL    Nitrite Urine Negative Negative    Leukocyte Esterase Urine Large (A) Negative    RBC Urine 2 <=2 /HPF    WBC Urine 71 (H) <=5 /HPF    Squamous Epithelials Urine 40 (H) <=1 /HPF    Transitional Epithelials Urine <1 <=1 /HPF    Hyaline Casts Urine 6 (H) <=2 /LPF    Narrative    Urine Culture ordered based on laboratory criteria   Ammonia     Status: Normal   Result Value Ref Range    Ammonia 11 11 - 51 umol/L   TSH with free T4 reflex     Status: Abnormal   Result Value Ref Range    TSH 4.45 (H) 0.30 - 4.20 uIU/mL   CBC with platelets and differential     Status: Abnormal   Result Value Ref Range    WBC Count 6.6 4.0 - 11.0 10e3/uL    RBC Count 3.94 3.80 - 5.20 10e6/uL    Hemoglobin 11.4 (L) 11.7 - 15.7 g/dL    Hematocrit 36.5 35.0 - 47.0 %    MCV 93 78 - 100 fL    MCH 28.9 26.5 - 33.0 pg    MCHC 31.2 (L) 31.5 - 36.5 g/dL    RDW 13.9 10.0 - 15.0 %    Platelet Count 380 150 - 450 10e3/uL    % Neutrophils 77 %    % Lymphocytes 14 %    % Monocytes 6 %    % Eosinophils 2 %    % Basophils 1 %    % Immature Granulocytes 0 %    NRBCs per 100 WBC  0 <1 /100    Absolute Neutrophils 5.1 1.6 - 8.3 10e3/uL    Absolute Lymphocytes 0.9 0.8 - 5.3 10e3/uL    Absolute Monocytes 0.4 0.0 - 1.3 10e3/uL    Absolute Eosinophils 0.2 0.0 - 0.7 10e3/uL    Absolute Basophils 0.1 0.0 - 0.2 10e3/uL    Absolute Immature Granulocytes 0.0 <=0.4 10e3/uL    Absolute NRBCs 0.0 10e3/uL   Blood gas venous     Status: Abnormal   Result Value Ref Range    pH Venous 7.33 7.32 - 7.43    pCO2 Venous 52 (H) 40 - 50 mm Hg    pO2 Venous 46 25 - 47 mm Hg    Bicarbonate Venous 27 21 - 28 mmol/L    Base Excess/Deficit Venous 0.4 -3.0 - 3.0 mmol/L    FIO2 21     Oxyhemoglobin Venous 77 (H) 70 - 75 %    O2 Sat, Venous 78.1 (H) 70.0 - 75.0 %    Narrative    In healthy individuals, oxyhemoglobin (O2Hb) and oxygen saturation (SO2) are approximately equal. In the presence of dyshemoglobins, oxyhemoglobin can be considerably lower than oxygen saturation.   T4 free     Status: Normal   Result Value Ref Range    Free T4 1.08 0.90 - 1.70 ng/dL   iStat Gases (lactate) venous, POCT     Status: Abnormal   Result Value Ref Range    Lactic Acid POCT 0.7 0.7 - 2.0 mmol/L    Bicarbonate Venous POCT 27 21 - 28 mmol/L    O2 Sat, Venous POCT 82 (H) 70 - 75 %    pCO2 Venous POCT 42 40 - 50 mm Hg    pH Venous POCT 7.41 7.32 - 7.43    pO2 Venous POCT 46 25 - 47 mm Hg    Base Excess/Deficit (+/-) POCT 2.0 -3.0 - 3.0 mmol/L   CBC with platelets differential     Status: Abnormal    Narrative    The following orders were created for panel order CBC with platelets differential.  Procedure                               Abnormality         Status                     ---------                               -----------         ------                     CBC with platelets and ...[[id number]]  Abnormal            Final result                 Please view results for these tests on the individual orders.     Medications   piperacillin-tazobactam (ZOSYN) 3.375 g vial to attach to  mL bag (0 g Intravenous Stopped 4/8/25 4389)    sodium chloride (PF) 0.9% PF flush 90 mL (90 mLs Intravenous $Given 4/8/25 0934)   iopamidol (ISOVUE-370) solution 67 mL (67 mLs Intravenous $Given 4/8/25 0934)     Labs Ordered and Resulted from Time of ED Arrival to Time of ED Departure   ISTAT CREATININE POCT - Abnormal       Result Value    Creatinine POCT 1.3 (*)     GFR, ESTIMATED POCT 44 (*)    COMPREHENSIVE METABOLIC PANEL - Abnormal    Sodium 141      Potassium 4.6      Carbon Dioxide (CO2) 25      Anion Gap 13      Urea Nitrogen 18.3      Creatinine 1.03 (*)     GFR Estimate 59 (*)     Calcium 9.8      Chloride 103      Glucose 111 (*)     Alkaline Phosphatase 93      AST 21      ALT 11      Protein Total 7.9      Albumin 4.1      Bilirubin Total 0.2     MAGNESIUM - Abnormal    Magnesium 2.6 (*)    CRP INFLAMMATION - Abnormal    CRP Inflammation 6.50 (*)    ERYTHROCYTE SEDIMENTATION RATE AUTO - Abnormal    Erythrocyte Sedimentation Rate 46 (*)    ROUTINE UA WITH MICROSCOPIC REFLEX TO CULTURE - Abnormal    Color Urine Light Yellow      Appearance Urine Slightly Cloudy (*)     Glucose Urine Negative      Bilirubin Urine Negative      Ketones Urine Negative      Specific Gravity Urine 1.024      Blood Urine Trace (*)     pH Urine 8.0 (*)     Protein Albumin Urine Negative      Urobilinogen Urine Normal      Nitrite Urine Negative      Leukocyte Esterase Urine Large (*)     RBC Urine 2      WBC Urine 71 (*)     Squamous Epithelials Urine 40 (*)     Transitional Epithelials Urine <1      Hyaline Casts Urine 6 (*)    TSH WITH FREE T4 REFLEX - Abnormal    TSH 4.45 (*)    CBC WITH PLATELETS AND DIFFERENTIAL - Abnormal    WBC Count 6.6      RBC Count 3.94      Hemoglobin 11.4 (*)     Hematocrit 36.5      MCV 93      MCH 28.9      MCHC 31.2 (*)     RDW 13.9      Platelet Count 380      % Neutrophils 77      % Lymphocytes 14      % Monocytes 6      % Eosinophils 2      % Basophils 1      % Immature Granulocytes 0      NRBCs per 100 WBC 0      Absolute Neutrophils  5.1      Absolute Lymphocytes 0.9      Absolute Monocytes 0.4      Absolute Eosinophils 0.2      Absolute Basophils 0.1      Absolute Immature Granulocytes 0.0      Absolute NRBCs 0.0     BLOOD GAS VENOUS - Abnormal    pH Venous 7.33      pCO2 Venous 52 (*)     pO2 Venous 46      Bicarbonate Venous 27      Base Excess/Deficit Venous 0.4      FIO2 21      Oxyhemoglobin Venous 77 (*)     O2 Sat, Venous 78.1 (*)    ISTAT GASES LACTATE VENOUS POCT - Abnormal    Lactic Acid POCT 0.7      Bicarbonate Venous POCT 27      O2 Sat, Venous POCT 82 (*)     pCO2 Venous POCT 42      pH Venous POCT 7.41      pO2 Venous POCT 46      Base Excess/Deficit (+/-) POCT 2.0     ISTAT INR POCT - Normal    INR POCT 1.1     LACTIC ACID WHOLE BLOOD WITH 1X REPEAT IN 2 HR WHEN >2 - Normal    Lactic Acid, Initial 1.3     PARTIAL THROMBOPLASTIN TIME - Normal    aPTT 29     PROCALCITONIN - Normal    Procalcitonin 0.12     AMMONIA - Normal    Ammonia 11     T4 FREE - Normal    Free T4 1.08     CYSTATIN C WITH GFR   BLOOD CULTURE   BLOOD CULTURE   URINE CULTURE     CTA Head Neck with Contrast   Final Result   IMPRESSION:     1. Head CTA demonstrates no stenosis of the major intracranial   arteries. Distal cavernous right internal carotid artery saccular   aneurysm measuring 3 x 3 mm.   2. Neck CTA demonstrates no stenosis of the major cervical arteries      I have personally reviewed the examination and initial interpretation   and I agree with the findings.      ESTEFANI COLE MD            SYSTEM ID:  [id number]      Head CT w/o contrast   Final Result   Impression:    1. No acute intracranial hemorrhage.   2. Mild to moderate parenchymal volume loss and sequelae of chronic   small vessel ischemic disease.       [Stroke Code Result: Negative]      Finding was identified on 4/8/2025 10:01 AM.       Dr. Mahan was contacted by Dr. Thorpe on 4/8/2025 10:22 AM via   PocketMobile and responded with understanding of results.      P Stroke Code  Communication Guidelines:   [facility] ED: [phone] (EB ED)   [facility] Inpatient: [phone] (Resident Pager)    or [place] 'Stroke First Call Resident [[facility]]' or Amcom 0979   [facility] ED: [phone] ([facility] ED)   [facility] Inpatient: Page [phone]       I have personally reviewed the examination and initial interpretation   and I agree with the findings.      ESTEFANI COLE MD            SYSTEM ID:  [id number]      MR Brain w/o & w Contrast    (Results Pending)          Critical Care Addendum  My initial assessment, based on my review of prehospital provider report, review of nursing observations, review of vital signs, focused history, physical exam, review of cardiac rhythm monitor, and 12 lead ECG analysis, established a high suspicion that Marylee Woods has altered mental status, which requires immediate intervention, and therefore she is critically ill.     After the initial assessment, the care team initiated multiple lab tests, initiated IV fluid administration, and consulted with neuro-stroke  to provide stabilization care. Due to the critical nature of this patient, I reassessed nursing observations, vital signs, physical exam, review of cardiac rhythm monitor, and 12 lead ECG analysis multiple times prior to her disposition.     Time also spent performing documentation, discussion with family to obtain medical information for decision making, reviewing test results, discussion with consultants, and coordination of care.     Critical care time (excluding teaching time and procedures): 40 minutes.       Assessment & Plan    MS patient wheelchair bound and verbal at baseline presents with AMS which, after stroke code de-escalated and upon discussion with her  who arrived a bit later, seems to be an ongoing and recurrent issue in the last several months. Being worked up for encephalopathy. No indicatio for emergent LP. Neuro team is at bedside and reviewing brain and neck imaging today. Signed out  to medicine for further sandoval and mgmt.     I have reviewed the nursing notes. I have reviewed the findings, diagnosis, plan and need for follow up with the patient.    New Prescriptions    No medications on file       Final diagnoses:   Stupor     DARLENE, Deana Nobles, am serving as a trained medical scribe to document services personally performed by Maru Mendoza MD based on the provider's statements to me on April 8, 2025.  This document has been checked and approved by the attending provider.    I, Maru Mendoza MD, was physically present and have reviewed and verified the accuracy of this note documented by Deana Nobles, medical scribe.      Maru Mendoza MD   [facility] EMERGENCY DEPARTMENT  4/8/2025     Maru Mendoza MD  04/08/25 2351

## 2025-04-08 NOTE — H&P
[facility]    History and Physical - Medicine Service, KENJI TEAM 5       Date of Admission:  4/8/2025    Assessment & Plan      Marylee Woods is a 69 year old female admitted on 4/8/2025 who has past medical history of multiple sclerosis (wheelchair bound), malignant neoplasm of left breast s/p lumpectomy in 2022, MARYLU, HTN, anemia, and depression who presents with recurrent episodes of encephalopathy.    Acute Encephalopathy, Toxic Metabolic vs Medication vs MS Flare vs Seizure  Patient with presenting with intermittent episodes of decreased mentation over the last few months lasting hours to a day, but more recently lasting multiple days. Elevated Cystatin C with estimated GFR of 32, but also increased Cr 1.03 from baseline of 7.0. Question of whether there is medication component given note that at least one morning she took morning meds without issues but later in afternoon became more somnolent. Also consideration for recurrence of UTI as possible contributor. Patient with mildly elevated TSH but with normal T4, currently on levothyroxine. Other metabolic etiologies felt less likely given current levels of electrolytes, BUN, glucose, no current respiratory concerns concerning for hypercarbia or hypoxia, normal LFTs and Ammonia reassuring against hepatic encephalopathy. CT head currently without concern for intracranial structural abnormality or hemorrhage. No leukocytosis or otherwise localizing symptoms concerning for other infection at this time.  - Stroke Neuro Consulted  - Toxic metabolic work up   - Brain MRI with and without contrast   - Urinalysis  - Neurology Consulted: For consideration of MS Flare, seizure, or other neurological etiology  - Holding off ordering MRI with and w/o contrast given concern for renal function, will continue to monitor overnight  - Ordered Cystatin C  - Holding PTA Armodafinil  - Holding PTA Baclofen 20mg/30mg AM/PM  -  Holding PTA Gabapentin 600/900mg    [facility] [place]  - Cont PTA Acyclovir    Hypertension  - Continued PTA amlodopine and losartan     Depression  - Continued PTA cymbalta     History of malignant neoplasm left breast status postlumpectomy  *Follows with Dr. 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   - Continued PTA levothyroxine      Glaucoma   - Continued PTA latanoprost           Diet:  Regular  DVT Prophylaxis: Heparin SQ  Verduzco Catheter: PRESENT, indication:    Fluids: None  Lines: None     Cardiac Monitoring: None  Code Status:  Full    Clinically Significant Risk Factors Present on Admission                  # Acute Kidney Injury, unspecified: based on a >150% or 0.3 mg/dL increase in last creatinine compared to past 90 day average, will monitor renal function  # Hypertension: Noted on problem list               # Financial/Environmental Concerns:           Disposition Plan      Expected Discharge Date: 04/10/2025                The patient's care was discussed with the Attending Physician, Dr. Swain .      Moiz De Los Santos MD  Medicine Service, [address]  Securely message with [employer] (more info)  Text page via [facility] Paging/Directory   See signed in provider for up to date coverage information  ______________________________________________________________________    Chief Complaint   Confusion    History is obtained from the patient    History of Present Illness   Marylee Woods is a 69 year old female who with past medical history of multiple sclerosis (wheelchair bound), malignant neoplasm of left breast s/p lumpectomy in 2022, MARYLU, HTN, anemia, and depression who presents with recurrent episodes of encephalopathy.    Majority of history attained from patient spouse, with son at bedside, except for review  "of systems. Patient family reports that over the prior months there have been relatively brief episodes wherein patient becomes more somnolent for the span of hours to around a day. In these episodes she would be able respond appropriately to questions before falling back asleep. Family report that at baseline, patient is \"completely normal\" from a cognitive standpoint, able to have nuanced conversation, read books, and do taxes while watch TV. Report that around 3-4wks ago was more somnolent and had EMS called, taken to the ED and was admitted for UTI. Was treated for UTI and acute colitis due to norovirus with waxing and waning mentation but ultimately improving prior to discharge from [facility] 3/14, was home for a few days, re-admitted later and thought to have UTI. Was at a TCU for the last week and was originally planning to discharge but then once again had recurrence in her mental status such that she was brought to the ED here from the TCU.    While in the ED thought to have a stroke due to facial droop and stroke code called, CT non-con and CTA reassuring against stroke. Was hemodynamically stable with -160/60-70s, HR 80-90s, afebrile, RA. Lactate wnl, ammonia wnl, elevated CRP to 6.5, normal procal, TSH 4.45 with normal free T4.  Labs:   Imaging:   Interventions:       Past Medical History    Past Medical History:   Diagnosis Date    Acute cystitis without hematuria     Acute pain of right knee     Atypical ductal hyperplasia of left breast 02/2022    Candida esophagitis - 2019 (H)     CKD (chronic kidney disease) stage 3, GFR 30-59 ml/min (H)     Community acquired pneumonia, unspecified laterality     Cough, unspecified type 04/05/2024    Depression     Epigastric pain     Former tobacco use     Fracture of femur, distal, left, closed (H)     Gastro-oesophageal reflux disease     Graves disease     History of fracture of fibula     History of tibial fracture     HSV (herpes simplex virus) " infection     Hyperlipidemia with target LDL less than 130     Hyponatremia     L tib fx s/p IM nailing     Lung nodules     Currently managed with follow up imaging by [facility] result Team.       Multiple sclerosis (H)     Osteoporosis     Postablative hypothyroidism        Past Surgical History   Past Surgical History:   Procedure Laterality Date    C/SECTION, LOW TRANSVERSE  1992    COLONOSCOPY  2007    COLONOSCOPY N/A 1/26/2017    Procedure: COMBINED COLONOSCOPY, SINGLE OR MULTIPLE BIOPSY/POLYPECTOMY BY BIOPSY;  Surgeon: Mayo Adkins MD, MD;  Location:  GI    ESOPHAGOSCOPY, GASTROSCOPY, DUODENOSCOPY (EGD), COMBINED  1/26/2017    Dr. Adkins [facility]    ESOPHAGOSCOPY, GASTROSCOPY, DUODENOSCOPY (EGD), COMBINED N/A 1/26/2017    Procedure: COMBINED ESOPHAGOSCOPY, GASTROSCOPY, DUODENOSCOPY (EGD), BIOPSY SINGLE OR MULTIPLE;  Surgeon: Mayo Adkins MD, MD;  Location:  GI    ESOPHAGOSCOPY, GASTROSCOPY, DUODENOSCOPY (EGD), COMBINED N/A 4/20/2023    Procedure: ESOPHAGOGASTRODUODENOSCOPY, WITH BIOPSY;  Surgeon: Cristina Zuniga MD;  Location: [facility]    HIP SURGERY  2009    femur ortho surgery    LAPAROSCOPIC CHOLECYSTECTOMY  6/24/2014    Procedure: LAPAROSCOPIC CHOLECYSTECTOMY;  Surgeon: Randy Bailey MD;  Location:  [facility]    LUMPECTOMY BREAST Left 3/31/2022    Procedure: LEFT Radiofrequency Identification Seed-Localized Lumpectomy;  Surgeon: Amanda Liu MD;  Location: [facility] OR    OPEN REDUCTION INTERNAL FIXATION FEMUR DISTAL Left 11/1/2018    Procedure: OPEN REDUCTION INTERNAL FIXATION LEFT FEMUR DISTAL;  Surgeon: Jose Valadez MD;  Location: [facility] OR    OPEN REDUCTION INTERNAL FIXATION RODDING INTRAMEDULLARY TIBIA  4/14/2013    Procedure: OPEN REDUCTION INTERNAL FIXATION RODDING INTRAMEDULLARY TIBIA;;  Surgeon: Sajan Cast MD;  Location:  OR    ORTHOPEDIC SURGERY  2009    surgery right upper femur fx near hip       Prior to Admission Medications   Prior to  Admission Medications   Prescriptions Last Dose Informant Patient Reported? Taking?   Armodafinil 200 MG TABS   Yes No   Sig: Take 200 mg by mouth every morning   CRANBERRY PO   Yes No   Sig: Take by mouth. Take 1 capsule once daily.   Calcium Carbonate Antacid 600 MG CHEW   Yes No   Sig: Take 600 mg by mouth daily   DULoxetine (CYMBALTA) 20 MG capsule   No No   Sig: TAKE 1 CAPSULE BY MOUTH ONCE DAILY ALONG  WITH  A  60  MG  TABLET  FOR  A  TOTAL  DOSE  OF  80  MG  DAILY   DULoxetine (CYMBALTA) 60 MG capsule   No No   Sig: Take 1 capsule by mouth once daily   Patient taking differently: Take 1 capsule by mouth once daily, along with 20 mg for total of 80 mg daily dose.   Multiple Vitamins-Minerals (MULTIPLE VITAMINS/WOMENS PO)   Yes No   Sig: Take by mouth daily   acetaminophen (TYLENOL) 500 MG tablet   No No   Sig: Take 1-2 tablets (500-1,000 mg) by mouth every 8 hours as needed for mild pain or fever (greater than 101 degrees)   acyclovir (ZOVIRAX) 400 MG tablet   No No   Sig: TAKE 1 TABLET BY MOUTH EVERY 12 HOURS   amLODIPine (NORVASC) 5 MG tablet   No No   Sig: Take 1 tablet by mouth once daily   amoxicillin (AMOXIL) 500 MG capsule   No No   Sig: Take 1 capsule (500 mg) by mouth every 8 hours.   baclofen (LIORESAL) 10 MG tablet   Yes No   Sig: Take 2 tablets (20 mg) in the morning and 3 tablets (30 mg) in the evening. May take one extra 20 mg dose in the afternoon as needed   estradiol (ESTRACE) 0.1 MG/GM vaginal cream   No No   Sig: Place 2 g vaginally twice a week.   Patient taking differently: Place 2 g vaginally daily. For 2 weeks.   famotidine (PEPCID) 40 MG tablet   No No   Sig: TAKE 1 TABLET BY MOUTH AT BEDTIME   gabapentin (NEURONTIN) 300 MG capsule   Yes No   Sig: Take 900 mg by mouth at bedtime.   gabapentin (NEURONTIN) 300 MG capsule   No No   Sig: Take 2 capsules (600 mg) by mouth every morning.   latanoprost (XALATAN) 0.005 % ophthalmic solution   Yes No   Sig: INSTILL 1 DROP INTO BOTH EYES EVERY  DAY AS DIRECTED PT WILL NEED TO BE SEEN FOR FUTURE REFILLS   levothyroxine (SYNTHROID/LEVOTHROID) 112 MCG tablet   No No   Sig: Take 1 tablet (112 mcg) by mouth daily   losartan (COZAAR) 50 MG tablet   No No   Sig: Take 1 tablet (50 mg) by mouth 2 times daily   magnesium oxide (MAG-OX) 400 MG tablet   Yes No   Sig: Take 400 mg by mouth daily.   nitroFURantoin macrocrystal-monohydrate (MACROBID) 100 MG capsule   No No   Sig: Take 1 capsule (100 mg) by mouth daily.   ondansetron (ZOFRAN ODT) 4 MG ODT tab   No No   Sig: Take 1 tablet (4 mg) by mouth every 6 hours as needed for nausea or vomiting.      Facility-Administered Medications: None           Physical Exam   Vital Signs: Temp: 98.3  F (36.8  C) Temp src: Axillary BP: (!) 151/67 Pulse: 83   Resp: 16 SpO2: 100 % O2 Device: None (Room air)    Weight: 155 lbs 11.2 oz    Constitutional: awake, alert, cooperative, no apparent distress, and appears stated age  Respiratory: No increased work of breathing, good air exchange, clear to auscultation bilaterally, no crackles or wheezing  Cardiovascular: Regular rate and rhythm, no murmur noted  GI: Normal bowel sounds, soft, non-distended, non-tender  Neurologic: Awake, alert, oriented to name, place and time.  Neuropsychiatric: General: normal, calm, and normal eye contact        Data   ------------------------- PAST 24 HR DATA REVIEWED -----------------------------------------------    I have personally reviewed the following data over the past 24 hrs:    6.6  \   11.4 (L)   / 380     141 103 18.3 /  111 (H)   4.6 25 1.3 (H) \     ALT: 11 AST: 21 AP: 93 TBILI: 0.2   ALB: 4.1 TOT PROTEIN: 7.9 LIPASE: N/A     TSH: 4.45 (H) T4: 1.08 A1C: N/A     Procal: 0.12 CRP: 6.50 (H) Lactic Acid: 0.7       INR:  1.1 PTT:  29   D-dimer:  N/A Fibrinogen:  N/A       Imaging results reviewed over the past 24 hrs:   Recent Results (from the past 24 hours)   Head CT w/o contrast    Narrative    CT HEAD W/O CONTRAST 4/8/2025 9:59  AM    History:  stroke?     Comparison: MRI 3/21/2025     Technique: Using multidetector thin collimation helical acquisition  technique, axial, coronal and sagittal CT images from the skull base  to the vertex were obtained without intravenous contrast.     Findings: There is no intracranial hemorrhage, mass effect or midline  shift. There is no focal loss of gray-white matter differentiation,  insular ribbon sign, or focally hyperdense artery to suggest acute  infarction. The ventricles are proportionate to the cerebral sulci.  Generalized parenchymal volume loss. Periventricular and subcortical  white matter hypodensities, likely due to chronic small vessel  ischemic disease.    The bony calvaria and the bones of the skull base appear normal. The  visualized portions of the paranasal sinuses are clear. Chronic  dependent right mastoid effusion.      Impression    Impression:   1. No acute intracranial hemorrhage.  2. Mild to moderate parenchymal volume loss and sequelae of chronic  small vessel ischemic disease.     [Stroke Code Result: Negative]    Finding was identified on 4/8/2025 10:01 AM.     Dr. Mahan was contacted by Dr. Thorpe on 4/8/2025 10:22 AM via  [employer] and responded with understanding of results.    [facility] Stroke Code Communication Guidelines:  [place] ED: [phone] (EB ED)  [place] Inpatient: [phone] (Resident Pager)   or [place] 'Stroke First Call Resident [[place]]' or Amcom [phone]  [facility] ED: [phone] ([facility] ED)  [facility] Inpatient: Page [phone]     I have personally reviewed the examination and initial interpretation  and I agree with the findings.    ESTEFANI COLE MD         SYSTEM ID:  [id number]   CTA Head Neck with Contrast    Narrative    EXAM: CTA HEAD NECK W CONTRAST  4/8/2025 10:11 AM     HISTORY:  stroke?       COMPARISON:  MRI 3/21/2025    TECHNIQUE:  HEAD and NECK CTA: During rapid bolus intravenous injection of  nonionic contrast material, axial images were obtained  using thin  collimation multidetector helical technique from the base of the upper  aortic arch through the cranial vertex. This CT angiogram data was  reconstructed at thin intervals with mild overlap. Images were sent to  the 3D workstation, and 3D reconstructions were obtained. The axial  source images, multiplanar reformations, 3D reconstructions in both  maximum intensity projection display and volume rendered models were  reviewed, with reconstructions performed by the technologist.    CONTRAST: iopamidol (ISOVUE-370) solution 67 mL    FINDINGS:  Head CTA demonstrates no large vessel arterial occlusion or stenosis  of the major intracranial arteries. Saccular aneurysm arising from the  distal cavernous right internal carotid artery measuring 3 x 3 mm and  projecting posteromedially (series 5, image 312).    Neck CTA demonstrates patent great vessel origins arising from aortic  arch. Minimal atherosclerotic plaque of the bilateral carotid bulbs  without associated stenosis. No vertebral artery stenosis.    No acute finding in the visualized neck soft tissues or in the  superior mediastinum/thorax.      Impression    IMPRESSION:    1. Head CTA demonstrates no stenosis of the major intracranial  arteries. 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  2. Neck CTA demonstrates no stenosis of the major cervical arteries    I have personally reviewed the examination and initial interpretation  and I agree with the findings.    ESTEFANI COLE MD         SYSTEM ID:  [id number]

## 2025-04-08 NOTE — CODE/RAPID RESPONSE
Stroke Code Nurse-Responder Note    Arrival Time to Stroke Code: 09:22    Stroke Code Team interventions:   - De-escalated at 09:45 by Dr. Ml Mahan    ED/Bedside Nurse providing handoff: Melanie Winter RN    Time left for CT: 09:30    Time arrived to next location (ED/Unit/IR): 09:38    ED/Bedside Nurse given handoff (name/time): 09:45 HEIDY Hudson RN

## 2025-04-09 ENCOUNTER — APPOINTMENT (OUTPATIENT)
Dept: NEUROLOGY | Facility: CLINIC | Age: 70
End: 2025-04-09
Payer: MEDICARE

## 2025-04-09 LAB
ANION GAP SERPL CALCULATED.3IONS-SCNC: 12 MMOL/L (ref 7–15)
BUN SERPL-MCNC: 17.5 MG/DL (ref 8–23)
CALCIUM SERPL-MCNC: 9.5 MG/DL (ref 8.8–10.4)
CHLORIDE SERPL-SCNC: 105 MMOL/L (ref 98–107)
CREAT SERPL-MCNC: 0.86 MG/DL (ref 0.51–0.95)
CYSTATIN C (ROCHE): 1.5 MG/L (ref 0.6–1)
EGFRCR SERPLBLD CKD-EPI 2021: 73 ML/MIN/1.73M2
ERYTHROCYTE [DISTWIDTH] IN BLOOD BY AUTOMATED COUNT: 14.1 % (ref 10–15)
GFR/BSA.PRED SERPLBLD CYS-BASED-ARV: 41 ML/MIN/1.73M2
GLUCOSE SERPL-MCNC: 120 MG/DL (ref 70–99)
HCO3 SERPL-SCNC: 23 MMOL/L (ref 22–29)
HCT VFR BLD AUTO: 37.2 % (ref 35–47)
HGB BLD-MCNC: 11.6 G/DL (ref 11.7–15.7)
MCH RBC QN AUTO: 29 PG (ref 26.5–33)
MCHC RBC AUTO-ENTMCNC: 31.2 G/DL (ref 31.5–36.5)
MCV RBC AUTO: 93 FL (ref 78–100)
PLATELET # BLD AUTO: 305 10E3/UL (ref 150–450)
POTASSIUM SERPL-SCNC: 4.8 MMOL/L (ref 3.4–5.3)
RBC # BLD AUTO: 4 10E6/UL (ref 3.8–5.2)
SODIUM SERPL-SCNC: 140 MMOL/L (ref 135–145)
WBC # BLD AUTO: 7.9 10E3/UL (ref 4–11)

## 2025-04-09 PROCEDURE — 95720 EEG PHY/QHP EA INCR W/VEEG: CPT | Performed by: PSYCHIATRY & NEUROLOGY

## 2025-04-09 PROCEDURE — 250N000013 HC RX MED GY IP 250 OP 250 PS 637

## 2025-04-09 PROCEDURE — 80048 BASIC METABOLIC PNL TOTAL CA: CPT

## 2025-04-09 PROCEDURE — 258N000003 HC RX IP 258 OP 636

## 2025-04-09 PROCEDURE — 36415 COLL VENOUS BLD VENIPUNCTURE: CPT

## 2025-04-09 PROCEDURE — 250N000011 HC RX IP 250 OP 636

## 2025-04-09 PROCEDURE — 82610 CYSTATIN C: CPT

## 2025-04-09 PROCEDURE — 120N000002 HC R&B MED SURG/OB UMMC

## 2025-04-09 PROCEDURE — 99232 SBSQ HOSP IP/OBS MODERATE 35: CPT | Mod: GC | Performed by: INTERNAL MEDICINE

## 2025-04-09 PROCEDURE — 250N000013 HC RX MED GY IP 250 OP 250 PS 637: Performed by: INTERNAL MEDICINE

## 2025-04-09 PROCEDURE — 85027 COMPLETE CBC AUTOMATED: CPT

## 2025-04-09 PROCEDURE — 99233 SBSQ HOSP IP/OBS HIGH 50: CPT | Mod: GC | Performed by: PSYCHIATRY & NEUROLOGY

## 2025-04-09 PROCEDURE — 95714 VEEG EA 12-26 HR UNMNTR: CPT

## 2025-04-09 RX ORDER — LIDOCAINE 40 MG/G
CREAM TOPICAL
Status: DISCONTINUED | OUTPATIENT
Start: 2025-04-09 | End: 2025-04-16 | Stop reason: HOSPADM

## 2025-04-09 RX ORDER — ACETAMINOPHEN 325 MG/1
975 TABLET ORAL 3 TIMES DAILY PRN
Status: DISCONTINUED | OUTPATIENT
Start: 2025-04-09 | End: 2025-04-16 | Stop reason: HOSPADM

## 2025-04-09 RX ORDER — ACETAMINOPHEN 325 MG/1
650 TABLET ORAL
Status: COMPLETED | OUTPATIENT
Start: 2025-04-09 | End: 2025-04-09

## 2025-04-09 RX ORDER — AMOXICILLIN 250 MG
1 CAPSULE ORAL 2 TIMES DAILY PRN
Status: DISCONTINUED | OUTPATIENT
Start: 2025-04-09 | End: 2025-04-16 | Stop reason: HOSPADM

## 2025-04-09 RX ORDER — PIPERACILLIN SODIUM, TAZOBACTAM SODIUM 4; .5 G/20ML; G/20ML
4.5 INJECTION, POWDER, LYOPHILIZED, FOR SOLUTION INTRAVENOUS EVERY 6 HOURS
Status: DISCONTINUED | OUTPATIENT
Start: 2025-04-09 | End: 2025-04-11

## 2025-04-09 RX ORDER — AMOXICILLIN 250 MG
2 CAPSULE ORAL 2 TIMES DAILY PRN
Status: DISCONTINUED | OUTPATIENT
Start: 2025-04-09 | End: 2025-04-16 | Stop reason: HOSPADM

## 2025-04-09 RX ADMIN — LOSARTAN POTASSIUM 50 MG: 50 TABLET, FILM COATED ORAL at 09:19

## 2025-04-09 RX ADMIN — ONDANSETRON 4 MG: 4 TABLET, ORALLY DISINTEGRATING ORAL at 19:11

## 2025-04-09 RX ADMIN — LATANOPROST 1 DROP: 50 SOLUTION OPHTHALMIC at 22:40

## 2025-04-09 RX ADMIN — ACETAMINOPHEN 650 MG: 325 TABLET, FILM COATED ORAL at 01:24

## 2025-04-09 RX ADMIN — LEVOTHYROXINE SODIUM 112 MCG: 0.11 TABLET ORAL at 06:23

## 2025-04-09 RX ADMIN — HEPARIN SODIUM 5000 UNITS: 5000 INJECTION, SOLUTION INTRAVENOUS; SUBCUTANEOUS at 00:12

## 2025-04-09 RX ADMIN — HEPARIN SODIUM 5000 UNITS: 5000 INJECTION, SOLUTION INTRAVENOUS; SUBCUTANEOUS at 13:41

## 2025-04-09 RX ADMIN — PIPERACILLIN AND TAZOBACTAM 3.38 G: 3; .375 INJECTION, POWDER, LYOPHILIZED, FOR SOLUTION INTRAVENOUS at 09:13

## 2025-04-09 RX ADMIN — FAMOTIDINE 20 MG: 20 TABLET, FILM COATED ORAL at 21:33

## 2025-04-09 RX ADMIN — AMLODIPINE BESYLATE 5 MG: 5 TABLET ORAL at 09:19

## 2025-04-09 RX ADMIN — ACYCLOVIR 400 MG: 400 TABLET ORAL at 21:33

## 2025-04-09 RX ADMIN — ACYCLOVIR 400 MG: 400 TABLET ORAL at 09:19

## 2025-04-09 RX ADMIN — SODIUM CHLORIDE, SODIUM LACTATE, POTASSIUM CHLORIDE, AND CALCIUM CHLORIDE 500 ML: .6; .31; .03; .02 INJECTION, SOLUTION INTRAVENOUS at 23:45

## 2025-04-09 RX ADMIN — HEPARIN SODIUM 5000 UNITS: 5000 INJECTION, SOLUTION INTRAVENOUS; SUBCUTANEOUS at 21:36

## 2025-04-09 RX ADMIN — DULOXETINE 80 MG: 60 CAPSULE, DELAYED RELEASE ORAL at 09:19

## 2025-04-09 RX ADMIN — ACETAMINOPHEN 975 MG: 325 TABLET, FILM COATED ORAL at 20:06

## 2025-04-09 RX ADMIN — PIPERACILLIN AND TAZOBACTAM 3.38 G: 3; .375 INJECTION, POWDER, LYOPHILIZED, FOR SOLUTION INTRAVENOUS at 01:31

## 2025-04-09 RX ADMIN — HEPARIN SODIUM 5000 UNITS: 5000 INJECTION, SOLUTION INTRAVENOUS; SUBCUTANEOUS at 06:23

## 2025-04-09 RX ADMIN — LOSARTAN POTASSIUM 50 MG: 50 TABLET, FILM COATED ORAL at 21:33

## 2025-04-09 RX ADMIN — PIPERACILLIN AND TAZOBACTAM 4.5 G: 4; .5 INJECTION, POWDER, LYOPHILIZED, FOR SOLUTION INTRAVENOUS at 13:41

## 2025-04-09 RX ADMIN — PIPERACILLIN AND TAZOBACTAM 4.5 G: 4; .5 INJECTION, POWDER, LYOPHILIZED, FOR SOLUTION INTRAVENOUS at 20:00

## 2025-04-09 ASSESSMENT — ACTIVITIES OF DAILY LIVING (ADL)
ADLS_ACUITY_SCORE: 81
ADLS_ACUITY_SCORE: 81
ADLS_ACUITY_SCORE: 78
ADLS_ACUITY_SCORE: 81
ADLS_ACUITY_SCORE: 75
ADLS_ACUITY_SCORE: 81
ADLS_ACUITY_SCORE: 75
ADLS_ACUITY_SCORE: 75
ADLS_ACUITY_SCORE: 81
ADLS_ACUITY_SCORE: 78
ADLS_ACUITY_SCORE: 75
ADLS_ACUITY_SCORE: 75
ADLS_ACUITY_SCORE: 81
ADLS_ACUITY_SCORE: 75
ADLS_ACUITY_SCORE: 81
ADLS_ACUITY_SCORE: 81
ADLS_ACUITY_SCORE: 79
ADLS_ACUITY_SCORE: 67

## 2025-04-09 NOTE — PROGRESS NOTES
[facility]    Progress Note - Medicine Service, KENJI TEAM 5       Date of Admission:  4/8/2025    Assessment & Plan   Marylee Woods is a 69 year old female admitted on 4/8/2025 who has past medical history of multiple sclerosis (ambulates with wheelchair), malignant neoplasm of left breast s/p lumpectomy in 2022, MARYLU, HTN, anemia of chronic disease, and depression who presents for evaluation of altered mental status.     Acute Encephalopathy, Toxic Metabolic vs Medication vs Infection  Patient's mentation has improved today. Her WBC count improved to 7.9 on 4/9 with IV Zosyn, down from 12.4 on 4/8. Was 6.6 on first check of admission 4/8. Ddx includes toxic metabolic encephalopathy 2/2 infection or medication vs. MS flare vs. seizure. There may be a component of encephalopathy 2/2 medication in setting of DENNISE, as the patient's alertness has improved since holding PTA meds. Consideration for UTI as component of her encephalopathy and given clinical improvement with antibiotics. CNS infection considered as patient endorsed headache and neck pain, but on exam and with clinical improvement thus far felt less likely after discussion with neuro, unlikely to be HSV encephalitis given patient has otherwise been on her ppx with acyclovir. Also felt that her clinical course is inconsistent with MS flare or PML. Toxic metabolic syndrome 2/2 UTI is most likely given patient's recent hx of UTI, UA/preliminary culture with evidence of infection, and clinical improvement after IV Zosyn. Blood culture is negative.  CNS infection is a possibility as patient endorses headache and neck pain.   -Continue IV Zosyn, urine culture pending for organism identification and sensitivity   -Continue holding PTA meds gabapentin, baclofen, and armodafinil  -Consulted Neurology   -Patient declined MRI of brain due to claustrophobia  -Started 24 hr EEG monitoring to r/o seizure  -Defer LP at this  time, as patient is clinically improving and lower suspicion for CNS infection at this time    DENNISE  Cr has improved from 1.03 on 4/8 to 0.86 4/9.  Suspect prerenal secondary to poor oral intake given evidence of hyaline casts.  -Started ensure supplementation    HSV Ppx  - Cont PTA Acyclovir     Hypertension  - Continued PTA amlodopine and losartan     Depression  - Continued PTA cymbalta     History of malignant neoplasm left breast status postlumpectomy  - Follows with Dr. 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   - Continued PTA levothyroxine      Glaucoma   - Continued PTA latanoprost           Diet: Regular Diet Adult  Snacks/Supplements Adult: Ensure Enlive; Between Meals    DVT Prophylaxis: Heparin SQ  Verduzco Catheter: PRESENT  Fluids: none  Lines: None     Cardiac Monitoring: None  Code Status:  Full Code    Clinically Significant Risk Factors Present on Admission                   # Hypertension: Noted on problem list               # Financial/Environmental Concerns:           Social Drivers of Health   Tobacco Use: Medium Risk (3/21/2025)    Patient History     Smoking Tobacco Use: Former     Smokeless Tobacco Use: Never     Passive Exposure: Current   Physical Activity: Inactive (4/2/2024)    Exercise Vital Sign     Days of Exercise per Week: 0 days     Minutes of Exercise per Session: 20 min   Social Connections: Unknown (4/2/2024)    Social Connection and Isolation Panel [NHANES]     Frequency of Social Gatherings with Friends and Family: Once a week         Disposition Plan     Medically Ready for Discharge: Anticipated in 2-4 Days      The patient's care was discussed with the Attending Physician, Dr. Swain and Chief Resident/Fellow.    Clair Simmons  Medical Student  Medicine Service, [facility] TEAM [address] [place] [address]  [facility]  Securely message with [employer]oc[employer] (more info)  Text page via [facility] Paging/Directory   See signed in provider for up to date coverage information    I was present with the medical student who participated in the service and in the documentation of the note. I have verified the history and personally performed the physical exam and medical decision making. I agree with the assessment and plan of care as documented in the note.    Moiz De Los Santos MD  Internal Medicine PGY3    ______________________________________________________________________    Interval History   This morning, patient was unable to recall events from admission. She endorses a headache and neck pain, but does not recall when it began. She was given Tylenol last night for her headache but does not recall if it helped her pain. She denies dysuria, urinary frequency/urgency, SOB, or chest pain.       Physical Exam   Vital Signs: Temp: 98.3  F (36.8  C) Temp src: Oral BP: (!) 149/80 Pulse: 88   Resp: 16 SpO2: 99 % O2 Device: None (Room air)    Weight: 155 lbs 11.2 oz    Gen: Awake and alert, NAD.  HEENT: Normocephalic, sclera non-icteric.   Respiratory: Normal respiratory effort.  Cardiac: RRR  Neuro/MSK: Patient is alert and oriented to self and location. Able to track finger with eyes. Light touch sensation intact on face, upper, and lower extremities bilaterally. Face is symmetric with normal eye closure, smile, and puffing of cheeks. Shoulder shrug intact. 5/5 strength in upper extremities. 2/5 strength in hip and knee flexion bilaterally. L leg 2/5 strength for ankle flexion, R ankle flexion 3/5. Ambulates with wheelchair at baseline.       Medical Decision Making       Please see A&P for additional details of medical decision making.      Data   ------------------------- PAST 24 HR DATA REVIEWED -----------------------------------------------    I have personally reviewed the following data over the past 24 hrs:    7.9  \   11.6 (L)   /  305     140 105 17.5 /  120 (H)   4.8 23 0.86 \       Imaging results reviewed over the past 24 hrs:   No results found for this or any previous visit (from the past 24 hours).

## 2025-04-09 NOTE — CONSULTS
[facility]  Neurology Consultation    Patient Name:  Marylee Woods  MRN:  [id number]    :  1955  Date of Service:  2025  Primary care provider:  Carolina Pulliam      Neurology consultation service was asked to see Marylee Woods by Dr. Cohen to evaluate recurrent episodes of AMS.    Chief Complaint:  Altered mental status    History of Present Illness:   Marylee Woods is a 69 year old female, with history of  multiple sclerosis with spastic paraplegia, HSV, hypothyroidism, obstructive sleep apnea, hypertension, anemia, and depression who presented to the emergency department on  after she was found to be unresponsive at her TCU.     Patient presented this hospitalization with with AMS and decreased ability to be awaken while at TCU. EMS was called and en route was concerned for stroke and subsequently initiated a stroke code on arrival to ED which was found to be unremarkable.      Patient has had multiple episodes of decreased mentation over the last month and in years previous. Patient was recently hospitalized multiple times in the month of march for infections(norovirus and UTI) of which her most recent stay from 3/26-3/29 was associated with AMS. Additionally, her AMS from her last hospitalization is similar in presentation to her current hospitalization.    When discussing with the patient on 25 she states that she has severe LE weakness due to residual MS symptoms and has been wheel chair bound since she broke her leg in . She adds that her leg weakness is currently at her baseline. She also states that she doesn't recall having any MS flairs recently. With regard to her MS treatment patient was started on Briumvi this February and was previously on Lemtrada. Limited Neurology notes found as provider is at an outside facility. Patient endorsed a headache and denied chest pain, Nausea, vomiting, abdominal pain.     She is very fearful of an  MRI due to claustrophobia.     ROS  A comprehensive ROS was performed and pertinent findings were included in HPI.     PMH  Past Medical History:   Diagnosis Date    Acute cystitis without hematuria     Acute pain of right knee     Atypical ductal hyperplasia of left breast 02/2022    Candida esophagitis - 2019 (H)     CKD (chronic kidney disease) stage 3, GFR 30-59 ml/min (H)     Community acquired pneumonia, unspecified laterality     Cough, unspecified type 04/05/2024    Depression     Epigastric pain     Former tobacco use     Fracture of femur, distal, left, closed (H)     Gastro-oesophageal reflux disease     Graves disease     History of fracture of fibula     History of tibial fracture     HSV (herpes simplex virus) infection     Hyperlipidemia with target LDL less than 130     Hyponatremia     L tib fx s/p IM nailing     Lung nodules     Currently managed with follow up imaging by [facility] result Team.       Multiple sclerosis (H)     Osteoporosis     Postablative hypothyroidism      Past Surgical History:   Procedure Laterality Date    C/SECTION, LOW TRANSVERSE  1992    COLONOSCOPY  2007    COLONOSCOPY N/A 1/26/2017    Procedure: COMBINED COLONOSCOPY, SINGLE OR MULTIPLE BIOPSY/POLYPECTOMY BY BIOPSY;  Surgeon: Mayo Adkins MD, MD;  Location:  GI    ESOPHAGOSCOPY, GASTROSCOPY, DUODENOSCOPY (EGD), COMBINED  1/26/2017    Dr. Adkins [facility]    ESOPHAGOSCOPY, GASTROSCOPY, DUODENOSCOPY (EGD), COMBINED N/A 1/26/2017    Procedure: COMBINED ESOPHAGOSCOPY, GASTROSCOPY, DUODENOSCOPY (EGD), BIOPSY SINGLE OR MULTIPLE;  Surgeon: Mayo Adkins MD, MD;  Location:  GI    ESOPHAGOSCOPY, GASTROSCOPY, DUODENOSCOPY (EGD), COMBINED N/A 4/20/2023    Procedure: ESOPHAGOGASTRODUODENOSCOPY, WITH BIOPSY;  Surgeon: Cristina Zuniga MD;  Location:  GI    HIP SURGERY  2009    femur ortho surgery    LAPAROSCOPIC CHOLECYSTECTOMY  6/24/2014    Procedure: LAPAROSCOPIC CHOLECYSTECTOMY;  Surgeon: Randy Bailey  MD Raúl;  Location:  [facility]    LUMPECTOMY BREAST Left 3/31/2022    Procedure: LEFT Radiofrequency Identification Seed-Localized Lumpectomy;  Surgeon: Amanda Liu MD;  Location: [facility] OR    OPEN REDUCTION INTERNAL FIXATION FEMUR DISTAL Left 11/1/2018    Procedure: OPEN REDUCTION INTERNAL FIXATION LEFT FEMUR DISTAL;  Surgeon: Jose Valadez MD;  Location: [facility] OR    OPEN REDUCTION INTERNAL FIXATION RODDING INTRAMEDULLARY TIBIA  4/14/2013    Procedure: OPEN REDUCTION INTERNAL FIXATION RODDING INTRAMEDULLARY TIBIA;;  Surgeon: Sajan Cast MD;  Location: [facility] OR    ORTHOPEDIC SURGERY  2009    surgery right upper femur fx near hip       Medications   I have personally reviewed the patient's medication list.     Allergies  I have personally reviewed the patient's allergy list.     Social History  Per dennis review, previous smoker, social alcohol drinker, has denied recreational drug use    Family History    Per chart review, Noncontributory      Physical Examination   Vitals: BP (!) 149/80 (BP Location: Left arm, Patient Position: Semi-Liriano's, Cuff Size: Adult Regular)   Pulse 88   Temp 98.3  F (36.8  C) (Oral)   Resp 16   Wt 70.6 kg (155 lb 11.2 oz)   LMP 01/31/2004 (Approximate)   SpO2 99%   BMI 23.67 kg/m    General: Lying in bed, NAD  Head: NC/AT  Eyes: no icterus, op pink and moist  Cardiac: RRR. Extremities warm, no edema.   Respiratory: non-labored on RA  GI: S/NT/ND  Skin: No rash or lesion on exposed skin  Psych: Mood pleasant, affect congruent  Neuro:  Mental status: Awake, alert, attentive, oriented to self but not time, place, or circumstance. Language is fluent and coherent with intact comprehension of complex commands(Left hand to right ear), naming and repetition.  Cranial nerves: VFF, conjugate gaze, EOMI with saccadic pursuit, facial sensation intact, face symmetric, shoulder shrug strong, tongue/uvula midline, no dysarthria.   Motor: No abnormal movements. 5/5 strength in  deltoids, biceps, triceps hand  bilaterally. 4/5 finger extension on left but full on right. LE motor exam revealed 1/5 strength in hip flexors, hip extensors, knee flexion, knee extension bilaterally with 2/5 strength with plantarflexion, dorsiflexion bilaterally   Reflexes: decreased symmetrically throughout biceps, brachioradialis, patellae, and achilles. Negative Wagoner, no clonus.  Sensory: Light touch sensation diminished in LUE and LLE  Coordination: FNF without ataxia or dysmetria  Gait: wheel chair bound at baseline    Investigations   I have personally reviewed pertinent labs, tests, and radiological imaging. Discussion of notable findings is included under Impression.     Was patient transferred from outside hospital?   No    Impression  Marylee Woods is a 69 year old female, with history including with history of  multiple sclerosis with spastic paraplegia, HSV, hypothyroidism, obstructive sleep apnea, hypertension, anemia, and depression, who presents with recurrent episodes of severe encephalopathy including most recently on 4/8. As far as we can tell based on discussion with the patient and review of the medical record, her current physical exam is at, or very near, her baseline and without new focal findings.     With regard to the cause of this patient's neurologic presentation, toxic metabolic encephalopathy 2/2 to UTI seems most likely given her global confusion w/o focal findings, UA with significant LE and WBC, past history of UTI's with subsequent AMS, and improvement with supportive care and Abx.  While the patient was never seen to have any abnormal movements, paroxysmal unresponsiveness like observed in this patient can be caused by seizure and so would be prudent to evaluate with a period of EEG monitoring to assess for epileptiform discharges or other abnormal findings.  The possibility of CNS infection was questioned by the primary team, and we view this to be quite unlikely given  the patient's improvement in leukocytosis with Zosyn (which has poor CNS penetrance), her lack of fever, and lack of meningeal signs on examination.  Similarly, the patient's presentation is not suggestive of MS flare.    Recommendations  -Initiate video EEG monitoring.  -Patient expressed opposition to the idea of having an MRI during this hospitalization.  While this would be an ideal way to assess for objective evidence of CNS infection or new demyelinating lesions, our clinical suspicion for both of these processes is low and therefore we are comfortable deferring MRI at this time.  -Neurology will continue to follow.    Thank you for involving Neurology in the care of Marylee Woods.  Please do not hesitate to call with questions/concerns (consult pager [phone]).      Patient was seen and discussed with Dr. Sandoval.    Jarred Hugo    Resident/Fellow Attestation   I, Paco Pearson MD, was present with the medical student who participated in the service and in the documentation of the note.  I have verified the history and personally performed the physical exam and medical decision making.  I agree with the assessment and plan of care as documented in the note.      Signed,    Paco Pearson MD  Neurology, PGY3

## 2025-04-09 NOTE — MEDICATION SCRIBE - ADMISSION MEDICATION HISTORY
Medication Scribe Admission Medication History    Admission medication history is complete. The information provided in this note is only as accurate as the sources available at the time of the update.    Information Source(s): Family member via phone    Pertinent Information: Manuel () reports the current medication list he has is from 03/04/25. He mentioned that Marylee typically manages her own medications. He is not sure if she is still taking the ondansetron (ZOFRAN ODT) 4 MG ODT tab. The nitroFURantoin macrocrystal-monohydrate (MACROBID) 100 MG capsule is currently on hold while she is taking the amoxicillin (AMOXIL) 500 MG capsule. He denies that she is taking any other medications. Dispense report and outside medication reconciliation list have been reviewed.     Changes made to PTA medication list:  Added: None  Deleted: None  Changed: None    Allergies reviewed with patient and updates made in EHR: yes    Medication History Completed By: Jennifer Pedro 4/8/2025 9:35 PM    PTA Med List   Medication Sig Note Last Dose/Taking    acetaminophen (TYLENOL) 500 MG tablet Take 1-2 tablets (500-1,000 mg) by mouth every 8 hours as needed for mild pain or fever (greater than 101 degrees)  Taking As Needed    acyclovir (ZOVIRAX) 400 MG tablet TAKE 1 TABLET BY MOUTH EVERY 12 HOURS  4/7/2025 Bedtime    amLODIPine (NORVASC) 5 MG tablet Take 1 tablet by mouth once daily  4/7/2025 Morning    amoxicillin (AMOXIL) 500 MG capsule Take 1 capsule (500 mg) by mouth every 8 hours.  4/7/2025 Morning    Armodafinil 200 MG TABS Take 200 mg by mouth every morning  4/7/2025 Morning    baclofen (LIORESAL) 10 MG tablet Take 2 tablets (20 mg) in the morning and 3 tablets (30 mg) in the evening. May take one extra 20 mg dose in the afternoon as needed  4/7/2025 Bedtime    Calcium Carbonate Antacid 600 MG CHEW Take 600 mg by mouth daily  4/7/2025 Bedtime    CRANBERRY PO Take by mouth. Take 1 capsule once daily.  4/7/2025 Bedtime     DULoxetine (CYMBALTA) 20 MG capsule TAKE 1 CAPSULE BY MOUTH ONCE DAILY ALONG  WITH  A  60  MG  TABLET  FOR  A  TOTAL  DOSE  OF  80  MG  DAILY  4/7/2025 Morning    DULoxetine (CYMBALTA) 60 MG capsule Take 1 capsule by mouth once daily (Patient taking differently: Take 1 capsule by mouth once daily, along with 20 mg for total of 80 mg daily dose.)  4/7/2025 Morning    estradiol (ESTRACE) 0.1 MG/GM vaginal cream Place 2 g vaginally twice a week. (Patient taking differently: Place 2 g vaginally daily. For 2 weeks.)  Taking Differently    famotidine (PEPCID) 40 MG tablet TAKE 1 TABLET BY MOUTH AT BEDTIME  4/7/2025 Bedtime    gabapentin (NEURONTIN) 300 MG capsule Take 2 capsules (600 mg) by mouth every morning.  4/7/2025 Morning    gabapentin (NEURONTIN) 300 MG capsule Take 900 mg by mouth at bedtime.  4/7/2025 Bedtime    latanoprost (XALATAN) 0.005 % ophthalmic solution INSTILL 1 DROP INTO BOTH EYES EVERY DAY AS DIRECTED PT WILL NEED TO BE SEEN FOR FUTURE REFILLS  4/7/2025 Bedtime    levothyroxine (SYNTHROID/LEVOTHROID) 112 MCG tablet Take 1 tablet (112 mcg) by mouth daily  4/7/2025 Morning    losartan (COZAAR) 50 MG tablet Take 1 tablet (50 mg) by mouth 2 times daily  4/7/2025 Bedtime    magnesium oxide (MAG-OX) 400 MG tablet Take 400 mg by mouth daily.  4/7/2025 Bedtime    Multiple Vitamins-Minerals (MULTIPLE VITAMINS/WOMENS PO) Take by mouth daily  4/7/2025 Bedtime    nitroFURantoin macrocrystal-monohydrate (MACROBID) 100 MG capsule Take 1 capsule (100 mg) by mouth daily.  Taking    ondansetron (ZOFRAN ODT) 4 MG ODT tab Take 1 tablet (4 mg) by mouth every 6 hours as needed for nausea or vomiting. 4/8/2025: Not sure Taking As Needed

## 2025-04-09 NOTE — PROGRESS NOTES
RN    Vital signs: BP (!) 149/80 (BP Location: Left arm, Patient Position: Semi-Liriano's, Cuff Size: Adult Regular)   Pulse 88   Temp 98.3  F (36.8  C) (Oral)   Resp 16   Wt 70.6 kg (155 lb 11.2 oz)   LMP 01/31/2004 (Approximate)   SpO2 99%   BMI 23.67 kg/m      Status: pt grew progressively more alert throughout the day  Neuro: This morning pt was A&Ox2. Pt oriented to person and place, disoriented to situation and time. At 1600 pt is A&Ox4.   Pain/Nausea: reports a headache, denies nausea   Cardiac: WDL, denies chest pain  Respiratory: WDL, denies SOB  Mobility: wheelchair bound, assist x2  Diet: regular  Labs: reviewed  LDAs: RPIV, LPIV  Skin/incisions: barrier cream applied to sacrum per pt request  GI/: BM x2 today, urinary catheter    Plan: Pt is connected to a 24 EEG. continue with plan of care, notify provider with changes

## 2025-04-09 NOTE — PLAN OF CARE
Goal Outcome Evaluation:  BP (!) 146/80 (BP Location: Left arm)   Pulse 88   Temp 98.2  F (36.8  C) (Oral)   Resp 20   Wt 70.6 kg (155 lb 11.2 oz)   LMP 01/31/2004 (Approximate)   SpO2 99%   BMI 23.67 kg/m      [id number]    Pt noted to be comfortable on this shift, VSS, but BP noted to be slightly elevated, but still WNL. PT is alert and oriented with some intermittent confusion, slow to respond,  arousable by voice, is able to verbalize her need. PT did c/o headache, PRN tylenol x1 administer, is on intermittent ABX, no adverse effects noted. Voiding via cazares, which is patent and draining well, had small bm x1. Cont POC.

## 2025-04-10 ENCOUNTER — APPOINTMENT (OUTPATIENT)
Dept: NEUROLOGY | Facility: CLINIC | Age: 70
End: 2025-04-10
Payer: MEDICARE

## 2025-04-10 ENCOUNTER — DOCUMENTATION ONLY (OUTPATIENT)
Dept: OTHER | Facility: CLINIC | Age: 70
End: 2025-04-10
Payer: MEDICARE

## 2025-04-10 VITALS
OXYGEN SATURATION: 96 % | SYSTOLIC BLOOD PRESSURE: 99 MMHG | DIASTOLIC BLOOD PRESSURE: 49 MMHG | BODY MASS INDEX: 23.67 KG/M2 | RESPIRATION RATE: 18 BRPM | WEIGHT: 155.7 LBS | TEMPERATURE: 98.7 F | HEART RATE: 86 BPM

## 2025-04-10 LAB
ANION GAP SERPL CALCULATED.3IONS-SCNC: 15 MMOL/L (ref 7–15)
BACTERIA BLD CULT: NORMAL
BACTERIA BLD CULT: NORMAL
BUN SERPL-MCNC: 15.1 MG/DL (ref 8–23)
CALCIUM SERPL-MCNC: 9.4 MG/DL (ref 8.8–10.4)
CHLORIDE SERPL-SCNC: 106 MMOL/L (ref 98–107)
CREAT SERPL-MCNC: 0.77 MG/DL (ref 0.51–0.95)
EGFRCR SERPLBLD CKD-EPI 2021: 83 ML/MIN/1.73M2
ERYTHROCYTE [DISTWIDTH] IN BLOOD BY AUTOMATED COUNT: 14.1 % (ref 10–15)
GLUCOSE BLDC GLUCOMTR-MCNC: 99 MG/DL (ref 70–99)
GLUCOSE SERPL-MCNC: 141 MG/DL (ref 70–99)
HCO3 SERPL-SCNC: 18 MMOL/L (ref 22–29)
HCT VFR BLD AUTO: 39 % (ref 35–47)
HGB BLD-MCNC: 11.4 G/DL (ref 11.7–15.7)
MAGNESIUM SERPL-MCNC: 2 MG/DL (ref 1.7–2.3)
MCH RBC QN AUTO: 29.6 PG (ref 26.5–33)
MCHC RBC AUTO-ENTMCNC: 29.2 G/DL (ref 31.5–36.5)
MCV RBC AUTO: 101 FL (ref 78–100)
PLATELET # BLD AUTO: 216 10E3/UL (ref 150–450)
PLATELET # BLD AUTO: 302 10E3/UL (ref 150–450)
POTASSIUM SERPL-SCNC: 4.7 MMOL/L (ref 3.4–5.3)
RBC # BLD AUTO: 3.85 10E6/UL (ref 3.8–5.2)
SODIUM SERPL-SCNC: 139 MMOL/L (ref 135–145)
WBC # BLD AUTO: 8.3 10E3/UL (ref 4–11)

## 2025-04-10 PROCEDURE — 120N000002 HC R&B MED SURG/OB UMMC

## 2025-04-10 PROCEDURE — 250N000011 HC RX IP 250 OP 636

## 2025-04-10 PROCEDURE — 36415 COLL VENOUS BLD VENIPUNCTURE: CPT

## 2025-04-10 PROCEDURE — 83735 ASSAY OF MAGNESIUM: CPT

## 2025-04-10 PROCEDURE — 99232 SBSQ HOSP IP/OBS MODERATE 35: CPT | Mod: GC | Performed by: PSYCHIATRY & NEUROLOGY

## 2025-04-10 PROCEDURE — 85049 AUTOMATED PLATELET COUNT: CPT

## 2025-04-10 PROCEDURE — 250N000013 HC RX MED GY IP 250 OP 250 PS 637

## 2025-04-10 PROCEDURE — 99232 SBSQ HOSP IP/OBS MODERATE 35: CPT | Mod: GC | Performed by: INTERNAL MEDICINE

## 2025-04-10 PROCEDURE — 95718 EEG PHYS/QHP 2-12 HR W/VEEG: CPT | Performed by: PSYCHIATRY & NEUROLOGY

## 2025-04-10 PROCEDURE — 95711 VEEG 2-12 HR UNMONITORED: CPT

## 2025-04-10 PROCEDURE — 80048 BASIC METABOLIC PNL TOTAL CA: CPT

## 2025-04-10 PROCEDURE — 85027 COMPLETE CBC AUTOMATED: CPT

## 2025-04-10 RX ORDER — BACLOFEN 10 MG/1
10 TABLET ORAL DAILY
Status: DISCONTINUED | OUTPATIENT
Start: 2025-04-10 | End: 2025-04-11

## 2025-04-10 RX ORDER — BACLOFEN 10 MG/1
10 TABLET ORAL DAILY
Status: DISCONTINUED | OUTPATIENT
Start: 2025-04-11 | End: 2025-04-10

## 2025-04-10 RX ORDER — BACLOFEN 10 MG/1
20 TABLET ORAL
Status: COMPLETED | OUTPATIENT
Start: 2025-04-10 | End: 2025-04-10

## 2025-04-10 RX ORDER — METHOCARBAMOL 500 MG/1
500 TABLET, FILM COATED ORAL
Status: DISCONTINUED | OUTPATIENT
Start: 2025-04-10 | End: 2025-04-10

## 2025-04-10 RX ADMIN — BACLOFEN 15 MG: 5 TABLET ORAL at 19:51

## 2025-04-10 RX ADMIN — HEPARIN SODIUM 5000 UNITS: 5000 INJECTION, SOLUTION INTRAVENOUS; SUBCUTANEOUS at 23:08

## 2025-04-10 RX ADMIN — LOSARTAN POTASSIUM 50 MG: 50 TABLET, FILM COATED ORAL at 19:52

## 2025-04-10 RX ADMIN — BACLOFEN 20 MG: 10 TABLET ORAL at 00:58

## 2025-04-10 RX ADMIN — ACYCLOVIR 400 MG: 400 TABLET ORAL at 08:10

## 2025-04-10 RX ADMIN — PIPERACILLIN AND TAZOBACTAM 4.5 G: 4; .5 INJECTION, POWDER, LYOPHILIZED, FOR SOLUTION INTRAVENOUS at 08:10

## 2025-04-10 RX ADMIN — DULOXETINE 80 MG: 60 CAPSULE, DELAYED RELEASE ORAL at 08:11

## 2025-04-10 RX ADMIN — PIPERACILLIN AND TAZOBACTAM 4.5 G: 4; .5 INJECTION, POWDER, LYOPHILIZED, FOR SOLUTION INTRAVENOUS at 20:08

## 2025-04-10 RX ADMIN — CALCIUM CARBONATE (ANTACID) CHEW TAB 500 MG 500 MG: 500 CHEW TAB at 08:10

## 2025-04-10 RX ADMIN — ACYCLOVIR 400 MG: 400 TABLET ORAL at 19:51

## 2025-04-10 RX ADMIN — FAMOTIDINE 20 MG: 20 TABLET, FILM COATED ORAL at 23:07

## 2025-04-10 RX ADMIN — PIPERACILLIN AND TAZOBACTAM 4.5 G: 4; .5 INJECTION, POWDER, LYOPHILIZED, FOR SOLUTION INTRAVENOUS at 14:11

## 2025-04-10 RX ADMIN — LOSARTAN POTASSIUM 50 MG: 50 TABLET, FILM COATED ORAL at 08:10

## 2025-04-10 RX ADMIN — AMLODIPINE BESYLATE 5 MG: 5 TABLET ORAL at 08:10

## 2025-04-10 RX ADMIN — HEPARIN SODIUM 5000 UNITS: 5000 INJECTION, SOLUTION INTRAVENOUS; SUBCUTANEOUS at 06:08

## 2025-04-10 RX ADMIN — PIPERACILLIN AND TAZOBACTAM 4.5 G: 4; .5 INJECTION, POWDER, LYOPHILIZED, FOR SOLUTION INTRAVENOUS at 02:13

## 2025-04-10 RX ADMIN — BACLOFEN 10 MG: 10 TABLET ORAL at 14:10

## 2025-04-10 RX ADMIN — LATANOPROST 1 DROP: 50 SOLUTION OPHTHALMIC at 23:08

## 2025-04-10 RX ADMIN — LEVOTHYROXINE SODIUM 112 MCG: 0.11 TABLET ORAL at 06:08

## 2025-04-10 RX ADMIN — HEPARIN SODIUM 5000 UNITS: 5000 INJECTION, SOLUTION INTRAVENOUS; SUBCUTANEOUS at 14:10

## 2025-04-10 ASSESSMENT — ACTIVITIES OF DAILY LIVING (ADL)
ADLS_ACUITY_SCORE: 79
ADLS_ACUITY_SCORE: 77
ADLS_ACUITY_SCORE: 79
ADLS_ACUITY_SCORE: 77
ADLS_ACUITY_SCORE: 79
ADLS_ACUITY_SCORE: 77
ADLS_ACUITY_SCORE: 79
ADLS_ACUITY_SCORE: 77

## 2025-04-10 NOTE — PLAN OF CARE
/67 (BP Location: Right arm)   Pulse 105   Temp 98.3  F (36.8  C) (Oral)   Resp 16   Wt 70.6 kg (155 lb 11.2 oz)   LMP 01/31/2004 (Approximate)   SpO2 97%   BMI 23.67 kg/m      Goal Outcome Evaluation:      Plan of Care Reviewed With: patient    Overall Patient Progress: no changeOverall Patient Progress: no change     A&Ox4, VSS on RA, Pain managed w/ PRN tylenol, EEG monitoring intact, PIV x2 SL, Nausea relieved w/ PRN zofran, BM incontinence x1, Verduzco intact, 2A -- repos Q2, Regular diet, Labs reviewed.    Provider notified about low urine output from 8768-2251 of 200 mL.    Admitted/transferred from: ED  2 RN full   skin assessment completed by Bob Schneider RN and Dasia BELL RN.    Skin assessment finding: issues found Scattered bruising on BUE, Blanchable redness to heels and elbows, Blanchable redness on coccyx, Darker spot on coccyx -- picture taken and in chart, PIV x2, Verduzco          Interventions/actions: skin interventions Q2 repos, Mepilex on coccyx, Low air loss pump added to mattress      Bedside Emergency Equipment Present:  Suction Regulator: Yes  Suction Canister: Yes  Tubing between Regulator and Canister: Yes  O2 Regulator with Tree: Yes  Ambu Bag: Yes

## 2025-04-10 NOTE — PROGRESS NOTES
VEEG monitoring preliminary results:    VEEG has been running for over one hour.  EEG showed no significant abnormalities. Patient had 2 spells of vertigo, without EEG correlations.  No epileptiform activities, no clinical or electrographic seizures were observed.    Yuriy Ojeda MD  Neurology

## 2025-04-10 NOTE — PLAN OF CARE
Goal Outcome Evaluation:      Plan of Care Reviewed With: patient    Overall Patient Progress: no change       Assumed Care: [id number]    Pt A&Ox4, able to verbalize needs. VSS, RA. Ax2 c lift. R&L PIV JOSÉ Verduzco in place, concerns of decreased UOP, 500mL bolus LR @ 250mL/hr given, c AOUP this shift. Pt not OOB, Q2 repositioning. Pt endorses spasms of BLE towards feet, baclofen given c relief. EEG overnight. Continue POC.

## 2025-04-10 NOTE — PROGRESS NOTES
"[facility]  Neurology Consultation - Progress Note    Patient Name:  Marylee Woods  Date of Service:  April 10, 2025    Subjective:    NAEO. Talking with patient today she states that she feels much better than the past couple days, but says that her neck has been hurting and didn't get much sleep. She states that she just ordered food and is wondering when she will get the EEG removed.     Objective:    Vitals: /66   Pulse 105   Temp 97.8  F (36.6  C) (Oral)   Resp 20   Wt 70.6 kg (155 lb 11.2 oz)   LMP 01/31/2004 (Approximate)   SpO2 100%   BMI 23.67 kg/m    General: Lying in bed, NAD  Head: Atraumatic, normocephalic   Cardiac: no lower extremity edema  Neurologic:  Mental Status: Fully alert, attentive and oriented. Speech clear and fluent. Able to follow complex commands(L hand touch R ear and stick tongue out) Appropriately names 3 objects  Cranial Nerves: VFF, conjugate gaze, EOMI, facial sensation intact, face symmetric, shoulder shrug strong, tongue/uvula midline, no dysarthria.   Motor: No abnormal movements. Full strength in UE, with exception of 4+/5 finger extension on left. Wiggles toes in LE but otherwise severely weak.   Sensory: Intact to light touch x 4 extremities but decreased in LE compared to UE.   Coordination: FNF without ataxia or dysmetria   Station/Gait:  wheel chair bound at baseline     Pertinent Investigations:    I have personally reviewed most recent and pertinent labs, tests, and radiological images.     EEG 4/10/25: \"Impression: This is a normal waking and sleep EEG. Patient had a few spells of vertigo, with no EEG correlations.\"    Assessment  Marylee Woods is a 69 year old female, with history including with history of multiple sclerosis with spastic paraplegia, HSV, hypothyroidism, obstructive sleep apnea, hypertension, anemia, and depression, who presented with recurrent episodes of severe encephalopathy including most recently on " 4/8. Neurology consulted for potential neurological etiology.     Based on discussion with the patient and review of the medical record, her physical exam remains at or near baseline. She exhibited an episode of global confusion and somnolence without new focal findings, but her overall mentation has progressively improved and no longer appears encephalopathic.    With regard to the cause of this patient's neurologic presentation, toxic metabolic encephalopathy 2/2 to UTI continues to seem most likely. Seizure is less likely given EEG monitoring showed no epileptiform discharges or other abnormal findings. CNS infection is unlikely given the patient's improvement with Zosyn (which has poor CNS penetrance) and MS flare is also unlikely given the patient's overall presentation.     Recommendations:   -Patient continues to decline MRI. This would definitively rule out CNS infection or new demyelinating lesions, however, our clinical suspicion for both of these processes is still low and therefore we are comfortable deferring MRI at this time.  -Given frequent DENNISE's, suggest reducing gabapentin to 300-600mg when reinitiated (PTA 900mg)  -No additional neurologic investigations indicated at this time, so neurology will sign off.    Thank you for involving Neurology in the care of Marylee Woods.  Please do not hesitate to call with questions/concerns (consult pager [phone]).      Patient was seen and discussed with Dr. Sandoval.    Jarred Hugo      Resident/Fellow Attestation   I, Paco Pearson MD, was present with the medical student who participated in the service and in the documentation of the note.  I have verified the history and personally performed the physical exam and medical decision making.  I agree with the assessment and plan of care as documented in the note.      Signed,    Paco Pearson MD  Neurology, PGY3

## 2025-04-10 NOTE — PLAN OF CARE
Goal Outcome Evaluation:      Plan of Care Reviewed With: patient    Overall Patient Progress: improvingOverall Patient Progress: improving    Temp: 98.4  F (36.9  C) Temp src: Oral BP: 119/57 Pulse: 104   Resp: 20 SpO2: 100 % O2 Device: None (Room air)       Pt is A&O x4, on RA, VSS. Able to make needs known. C/O neck pain, team aware. Pain managed with heat therapy. Denies any chest pain or nausea. Pt is wheelchair bound baseline. Movement minimal on BLE. Turned/repositioned in bed Q2H. Verduzco catheter removed today at 1130, urine output was 700 ml. Now due to void. +BS, BM x1. On regular diet, tolerating - denies nausea. Continue with POC.

## 2025-04-10 NOTE — CONSULTS
Care Management Initial Consult    General Information  Assessment completed with:  patient at bedside, along with chart review.      Primary Care Provider verified and updated as needed:   Yes. Dr. Carolina Pulliam MD  [facility]    Readmission within the last 30 days:   yes. Prior admission on 3/26 pm.         Advance Care Planning:     Pt is full code, but does not have ACP docs. SW provided pt with a blank health care directive to complete.    Communication Assessment  Patient's communication style: spoken language - English  Hearing Difficulty or Deaf: no   Wear Glasses or Blind: yes    Cognitive  Cognitive/Neuro/Behavioral: WDL  Level of Consciousness: alert (forgetful)  Arousal Level: opens eyes spontaneously  Orientation: oriented x 4  Mood/Behavior: calm, cooperative  Best Language: 0 - No aphasia  Speech: spontaneous, clear, logical    Living Environment:   People in home:    Pt lives with her spouse Manuel and her daughter.  Current living Arrangements:    Pt lives in a 2-story home in [place], [place]. There is a ramp to enter into the home. Once in the home, everything is accessible from the main level. The main level has a kitchen, living room, bedroom and bathroom with a claw-foot tub. There are grab bars by the toilet in the bathroom. The pt's daughter lives upstairs and is available to help if/when needed.   Able to return to prior arrangements:  yes. Pt would like to return home vs TCU. Pt stated that she is not wanting to return to [facility], where she was at before admission.      Family/Social Support:  Care provided by:  spouse, Manuel and daughter (also in home)  Provides care for:  none  Support system:    Pt's spouse, daughter and son. Pt also has a sister as an emergency contact, but she is not available for other supports.      Current Resources:   Patient receiving home care services:  Pt has had home care in the past with [facility]. Pt reports that she was getting OT and PT, but  not nursing that she can remember.   Community Resources:  None  Equipment currently used at home: grab bar, toilet, wheelchair - power, transfer board, 2 walkers  Supplies currently used at home:  none    Employment/Financial:  Employment Status:     not working  Financial Concerns:   none    Does the patient's insurance plan have a 3 day qualifying hospital stay waiver?  No    Lifestyle & Psychosocial Needs:  Social Drivers of Health     Food Insecurity: Low Risk  (4/9/2025)    Food Insecurity     Within the past 12 months, did you worry that your food would run out before you got money to buy more?: No     Within the past 12 months, did the food you bought just not last and you didn t have money to get more?: No   Depression: Not at risk (2/19/2025)    PHQ-2     PHQ-2 Score: 2   Housing Stability: Low Risk  (4/9/2025)    Housing Stability     Do you have housing? : Yes     Are you worried about losing your housing?: No   Tobacco Use: Medium Risk (3/21/2025)    Patient History     Smoking Tobacco Use: Former     Smokeless Tobacco Use: Never     Passive Exposure: Current   Financial Resource Strain: Low Risk  (4/9/2025)    Financial Resource Strain     Within the past 12 months, have you or your family members you live with been unable to get utilities (heat, electricity) when it was really needed?: No   Alcohol Use: Not on file   Transportation Needs: Low Risk  (4/9/2025)    Transportation Needs     Within the past 12 months, has lack of transportation kept you from medical appointments, getting your medicines, non-medical meetings or appointments, work, or from getting things that you need?: No   Physical Activity: Inactive (4/2/2024)    Exercise Vital Sign     Days of Exercise per Week: 0 days     Minutes of Exercise per Session: 20 min   Interpersonal Safety: Low Risk  (4/9/2025)    Interpersonal Safety     Do you feel physically and emotionally safe where you currently live?: Yes     Within the past 12 months,  "have you been hit, slapped, kicked or otherwise physically hurt by someone?: No     Within the past 12 months, have you been humiliated or emotionally abused in other ways by your partner or ex-partner?: No   Stress: No Stress Concern Present (4/2/2024)    [employer] - Occupational Stress Questionnaire     Feeling of Stress : Only a little   Social Connections: Unknown (4/2/2024)    Social Connection and Isolation Panel [NHANES]     Frequency of Communication with Friends and Family: Not on file     Frequency of Social Gatherings with Friends and Family: Once a week     Attends Taoism Services: Not on file     Active Member of Clubs or Organizations: Not on file     Attends Club or Organization Meetings: Not on file     Marital Status: Not on file   Health Literacy: Not on file     Functional Status:  Prior to admission patient needed assistance:  Yes. Pt was not working, driving, cooking cleaning or running errands. Pt reports that she does manage her own medications. Pt ambulates in the home with her power w/c, but hopes to use her walker as well.      Mental Health Status:  Pt reports that she struggles with depression. She is currenlty taking Fluoxetine 80- mg and states that it is helping her symptoms. Pt does not have a therapist, but is interested in having one.     SW [facility], to help pt with a therapist.     Chemical Dependency Status:  Pt reports that she occasionally drinks alcohol, but does not use marijuana or other drugs - aside from \"gummies\" at times. Pt reports being a past smoker, but quit in 2024.     Values/Beliefs:  Spiritual, Cultural Beliefs, Taoism Practices, Values that affect care:    Pt is Rastafari/Mandaen          Discussed  Partnership in Safe Discharge Planning  document with patient/family: Yes: Discussed with pt and provided handout.     Additional Information:    , covering for Unit " "7B completed chart review and attended rounds. SW received consult for an assessment due to pt having a high risk score. SW met with pt at bedside to complete CMA.     Per H&P, \"patient is a 69 year old female admitted on 4/8/2025 who has past medical history of multiple sclerosis (wheelchair bound), malignant neoplasm of left breast s/p lumpectomy in 2022, MARYLU, HTN, anemia, and depression who presents with recurrent episodes of encephalopathy.\"    Patient reports that she has been feeling a little bit better. When pt is not in the hospital she enjoys doing crafts.     Next Steps: SW follow pt for pending PT/OT recs and discharge planning.     Kiara Scruggs, SCOTT, LGSW  Coverage   [facility]  [email or web address]        "

## 2025-04-11 LAB
ANION GAP SERPL CALCULATED.3IONS-SCNC: 12 MMOL/L (ref 7–15)
BACTERIA UR CULT: ABNORMAL
BUN SERPL-MCNC: 12.6 MG/DL (ref 8–23)
CALCIUM SERPL-MCNC: 9.4 MG/DL (ref 8.8–10.4)
CHLORIDE SERPL-SCNC: 108 MMOL/L (ref 98–107)
CREAT SERPL-MCNC: 0.84 MG/DL (ref 0.51–0.95)
EGFRCR SERPLBLD CKD-EPI 2021: 75 ML/MIN/1.73M2
ERYTHROCYTE [DISTWIDTH] IN BLOOD BY AUTOMATED COUNT: 14.6 % (ref 10–15)
GLUCOSE SERPL-MCNC: 100 MG/DL (ref 70–99)
HCO3 SERPL-SCNC: 21 MMOL/L (ref 22–29)
HCT VFR BLD AUTO: 30.7 % (ref 35–47)
HGB BLD-MCNC: 9.8 G/DL (ref 11.7–15.7)
MCH RBC QN AUTO: 28.7 PG (ref 26.5–33)
MCHC RBC AUTO-ENTMCNC: 31.9 G/DL (ref 31.5–36.5)
MCV RBC AUTO: 90 FL (ref 78–100)
PLATELET # BLD AUTO: 286 10E3/UL (ref 150–450)
POTASSIUM SERPL-SCNC: 4.1 MMOL/L (ref 3.4–5.3)
RBC # BLD AUTO: 3.42 10E6/UL (ref 3.8–5.2)
SODIUM SERPL-SCNC: 141 MMOL/L (ref 135–145)
WBC # BLD AUTO: 3.6 10E3/UL (ref 4–11)

## 2025-04-11 PROCEDURE — 80048 BASIC METABOLIC PNL TOTAL CA: CPT

## 2025-04-11 PROCEDURE — 120N000002 HC R&B MED SURG/OB UMMC

## 2025-04-11 PROCEDURE — 99221 1ST HOSP IP/OBS SF/LOW 40: CPT | Mod: GC | Performed by: STUDENT IN AN ORGANIZED HEALTH CARE EDUCATION/TRAINING PROGRAM

## 2025-04-11 PROCEDURE — 250N000011 HC RX IP 250 OP 636

## 2025-04-11 PROCEDURE — 99223 1ST HOSP IP/OBS HIGH 75: CPT | Performed by: INTERNAL MEDICINE

## 2025-04-11 PROCEDURE — 250N000013 HC RX MED GY IP 250 OP 250 PS 637: Performed by: INTERNAL MEDICINE

## 2025-04-11 PROCEDURE — 250N000013 HC RX MED GY IP 250 OP 250 PS 637

## 2025-04-11 PROCEDURE — 36415 COLL VENOUS BLD VENIPUNCTURE: CPT

## 2025-04-11 PROCEDURE — G0545 PR INHRENT VISIT TO INPT/OBS W CNFRM/SUSPCT INFCT DIS BY INFCT DIS SPCIALST: HCPCS | Performed by: INTERNAL MEDICINE

## 2025-04-11 PROCEDURE — 85014 HEMATOCRIT: CPT

## 2025-04-11 PROCEDURE — 99233 SBSQ HOSP IP/OBS HIGH 50: CPT | Mod: GC | Performed by: INTERNAL MEDICINE

## 2025-04-11 RX ORDER — BACLOFEN 10 MG/1
20 TABLET ORAL DAILY
Status: DISCONTINUED | OUTPATIENT
Start: 2025-04-12 | End: 2025-04-15

## 2025-04-11 RX ORDER — BACLOFEN 10 MG/1
30 TABLET ORAL EVERY EVENING
Status: DISCONTINUED | OUTPATIENT
Start: 2025-04-11 | End: 2025-04-15

## 2025-04-11 RX ORDER — CIPROFLOXACIN 500 MG/1
500 TABLET, FILM COATED ORAL EVERY 12 HOURS SCHEDULED
Status: DISCONTINUED | OUTPATIENT
Start: 2025-04-11 | End: 2025-04-11

## 2025-04-11 RX ORDER — CIPROFLOXACIN 500 MG/1
500 TABLET, FILM COATED ORAL EVERY 12 HOURS SCHEDULED
Status: COMPLETED | OUTPATIENT
Start: 2025-04-11 | End: 2025-04-14

## 2025-04-11 RX ADMIN — LATANOPROST 1 DROP: 50 SOLUTION OPHTHALMIC at 23:34

## 2025-04-11 RX ADMIN — LOSARTAN POTASSIUM 50 MG: 50 TABLET, FILM COATED ORAL at 20:31

## 2025-04-11 RX ADMIN — LEVOTHYROXINE SODIUM 112 MCG: 0.11 TABLET ORAL at 06:43

## 2025-04-11 RX ADMIN — ACYCLOVIR 400 MG: 400 TABLET ORAL at 20:31

## 2025-04-11 RX ADMIN — CIPROFLOXACIN 500 MG: 500 TABLET ORAL at 18:55

## 2025-04-11 RX ADMIN — HEPARIN SODIUM 5000 UNITS: 5000 INJECTION, SOLUTION INTRAVENOUS; SUBCUTANEOUS at 06:43

## 2025-04-11 RX ADMIN — ACETAMINOPHEN 975 MG: 325 TABLET, FILM COATED ORAL at 14:19

## 2025-04-11 RX ADMIN — HEPARIN SODIUM 5000 UNITS: 5000 INJECTION, SOLUTION INTRAVENOUS; SUBCUTANEOUS at 14:05

## 2025-04-11 RX ADMIN — CALCIUM CARBONATE (ANTACID) CHEW TAB 500 MG 500 MG: 500 CHEW TAB at 08:25

## 2025-04-11 RX ADMIN — FAMOTIDINE 20 MG: 20 TABLET, FILM COATED ORAL at 23:34

## 2025-04-11 RX ADMIN — PIPERACILLIN AND TAZOBACTAM 4.5 G: 4; .5 INJECTION, POWDER, LYOPHILIZED, FOR SOLUTION INTRAVENOUS at 00:59

## 2025-04-11 RX ADMIN — PIPERACILLIN AND TAZOBACTAM 4.5 G: 4; .5 INJECTION, POWDER, LYOPHILIZED, FOR SOLUTION INTRAVENOUS at 14:05

## 2025-04-11 RX ADMIN — BACLOFEN 10 MG: 10 TABLET ORAL at 08:24

## 2025-04-11 RX ADMIN — ACYCLOVIR 400 MG: 400 TABLET ORAL at 08:24

## 2025-04-11 RX ADMIN — DULOXETINE 80 MG: 60 CAPSULE, DELAYED RELEASE ORAL at 08:25

## 2025-04-11 RX ADMIN — BACLOFEN 30 MG: 10 TABLET ORAL at 20:31

## 2025-04-11 RX ADMIN — AMLODIPINE BESYLATE 5 MG: 5 TABLET ORAL at 08:26

## 2025-04-11 RX ADMIN — PIPERACILLIN AND TAZOBACTAM 4.5 G: 4; .5 INJECTION, POWDER, LYOPHILIZED, FOR SOLUTION INTRAVENOUS at 08:26

## 2025-04-11 RX ADMIN — ACETAMINOPHEN 975 MG: 325 TABLET, FILM COATED ORAL at 23:34

## 2025-04-11 RX ADMIN — HEPARIN SODIUM 5000 UNITS: 5000 INJECTION, SOLUTION INTRAVENOUS; SUBCUTANEOUS at 23:33

## 2025-04-11 RX ADMIN — LOSARTAN POTASSIUM 50 MG: 50 TABLET, FILM COATED ORAL at 08:25

## 2025-04-11 ASSESSMENT — ACTIVITIES OF DAILY LIVING (ADL)
ADLS_ACUITY_SCORE: 83
ADLS_ACUITY_SCORE: 77
ADLS_ACUITY_SCORE: 83
ADLS_ACUITY_SCORE: 83
ADLS_ACUITY_SCORE: 77
ADLS_ACUITY_SCORE: 83
ADLS_ACUITY_SCORE: 87
ADLS_ACUITY_SCORE: 83
ADLS_ACUITY_SCORE: 87
ADLS_ACUITY_SCORE: 83
ADLS_ACUITY_SCORE: 87
ADLS_ACUITY_SCORE: 83
ADLS_ACUITY_SCORE: 83
ADLS_ACUITY_SCORE: 87
ADLS_ACUITY_SCORE: 87
ADLS_ACUITY_SCORE: 83
ADLS_ACUITY_SCORE: 77

## 2025-04-11 NOTE — PROGRESS NOTES
[facility]    Progress Note - Medicine Service, KENJI TEAM 5       Date of Admission:  4/8/2025    Today  -Consulted ID   -Pending formal recommendation, plan to switch abx per recommendations  -Consulted PM&R   -Agrees that encephalopathy is most likely 2/2 infection, though gabapentin in the setting of DENNISE may have contributed to AMS due to poor renal clearance. Gabapentin more likely to cause AMS than baclofen.   -Hold gabapentin for further management by outpatient neurology  -Increase baclofen back to PTA dosing  -Consulted Neurology   -Reassured by patient's continual improvement in sx that her AMS is not consistent with MS flare.   -Will continue to discuss with patient and spouse rationale regarding MRI, may request neurology assistance  -Consulted PT/OT   -Evaluating for appropriate discharge dispo    Assessment & Plan   Marylee Woods is a 69 year old female admitted on 4/8/2025. She has past medical history of multiple sclerosis (ambulates with wheelchair), malignant neoplasm of left breast s/p lumpectomy in 2022, MARYLU, HTN, anemia of chronic disease, and depression who presents for evaluation of altered mental status.     Acute Encephalopathy, Toxic Metabolic vs Medication vs Infection  Patient's mentation is stable from 4/11. Her WBC count was elevated on admission, then normalized 4/10-4/11, is now low at 3.6 on 4/11. Patient has documented pancytopenia dating back to at least 2014 and undergoes Briumvi infusions for MS, which can lead to neutropenia. Most likely diagnosis at this time is acute encephalopathy 2/2 to infection and medication, possible contribution of an DENNISE leading to poor renal clearance of gabapentin. Patient clinically improved after antibiotics were initiated and home meds were held simultaneously on admission, thus difficult to say what the definitive cause of her AMS. Likely multifactorial. CNS infection considered as patient endorsed  headache and neck pain, but on exam and with clinical improvement thus far felt less likely after discussion with neuro, unlikely to be HSV encephalitis given patient has otherwise been on her ppx with acyclovir. Also felt that her clinical course is inconsistent with MS flare or PML. 24 hour EEG completed 4/9-4/10 was normal, thus seizures are unlikely. Blood cultures negative.   -Consulted ID  - Urine culture shows multiple organisms with multi-drug resistance. Continue on Zosyn, plan to switch abx regimen with formal recommendation  - Consulted PM&R for assistance in managing spasticity, question of risk-benefit of considering other spasticity agent, if there is component of baclofen in the encephalopathy.               - PM&R agrees the most likely source of encephalopathy is UTI. Prerenal DENNISE could have decreased the clearance of gabapentin and baclofen, though gabapentin is more likely to cause AMS than baclofen  - Consulted Neurology              -Patient initially declined MRI due to claustrophobia, though is now agreeable. Neurology is reassured by exam this is unlikely to be MS flare and does not recommend one at this time   -Will reach out to Neurology 4/12 to discuss with patient rationale for not recommending scan at this time  -Defer LP at this time, as patient is clinically improving and lower suspicion for CNS infection at this time  - Removed Verduzco 4/10, TOV     Headache and Neck Pain  Patient's headache and neck pain has improved today, which is reassuring that a CNS infection is less likely. Has endorsed headache since admission accompanied by neck pain. Most likely tension headache due to patient's positioning while somnolent. Deferred NSAIDs due to patient's recent DENNISE.   -Continue using heat pad for headache and neck pain    Bowel Incontinent  Patient had 1 episode of bowel incontinence this am. Has had episodes in the past, though has had constipation more recently.     DENNISE  Cr has remained  WNL from 4/9-4/11, was 0.84 today, down from 1.03 on 4/8. Suspect prerenal secondary to poor oral intake given evidence of hyaline casts and improvement of Cr with increased oral intake.   -Continue ensure supplementation      Coccyx pressure wound  Per nursing note on 4/9, was found to have wound on coccyx, likely pressure sore.   -Continue low air loss mattress for pressure sore prevention  -Consulted WOC     HSV Ppx  - Cont PTA Acyclovir     Hypertension  - Continued PTA amlodipine and losartan     Depression  - Continued PTA Cymbalta     History of malignant neoplasm left breast status postlumpectomy  - Follows with Dr. 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   - Continued PTA levothyroxine      Glaucoma   - Continued PTA latanoprost         Diet: Snacks/Supplements Adult: Ensure Enlive; Between Meals  Combination Diet Regular Diet Adult    DVT Prophylaxis: Heparin SQ  Verduzco Catheter: Not present  Fluids: none  Lines: None     Cardiac Monitoring: None  Code Status: Full Code      Clinically Significant Risk Factors          # Hyperchloremia: Highest Cl = 108 mmol/L in last 2 days, will monitor as appropriate              # Hypertension: Noted on problem list                # Financial/Environmental Concerns:           Social Drivers of Health   Tobacco Use: Medium Risk (3/21/2025)    Patient History     Smoking Tobacco Use: Former     Smokeless Tobacco Use: Never     Passive Exposure: Current   Physical Activity: Inactive (4/2/2024)    Exercise Vital Sign     Days of Exercise per Week: 0 days     Minutes of Exercise per Session: 20 min   Social Connections: Unknown (4/2/2024)    Social Connection and Isolation Panel [NHANES]     Frequency of Social Gatherings with Friends and Family: Once a week         Disposition Plan     Medically Ready for Discharge:  Anticipated in 2-4 Days    The patient's care was discussed with the Attending Physician, Dr. Swain and Chief Resident/Fellow.    Clair Simmons  Medical Student  Medicine Service, [facility] TEAM 5  [facility]  Securely message with Vocera (more info)  Text page via [facility] Paging/Directory   See signed in provider for up to date coverage information    I was present with the medical student who participated in the service and in the documentation of the note. I have verified the history and personally performed the physical exam and medical decision making. I agree with the assessment and plan of care as documented in the note.    Moiz De Los Santos MD  Internal Medicine PGY3  ______________________________________________________________________    Interval History   Today, the patient's headache and neck pain have improved. She has been using heat packs on her neck with benefit. However, she had 1 episode of bowel incontinence with loose stool this morning. She reports urgency and a feeling of inability to control the bowel movement. She has had episodes of bowel incontinence in the past, but prior to this admission, has recently had constipation with 7-10 days in between bowel movements. Additionally, she continues to have muscle spasms in her ankles bilaterally, though denies neuropathy. Denies subjective fever, chills, abdominal pain, continues to deny dysuria, and urinary frequency or urgency.     Physical Exam   Vital Signs: Temp: 98.3  F (36.8  C) Temp src: Oral BP: 133/63 Pulse: 82   Resp: 18 SpO2: 99 % O2 Device: None (Room air)    Weight: 155 lbs 11.2 oz    Gen: Awake and alert, NAD.  HEENT: Normocephalic, sclera non-icteric.   Respiratory: Normal respiratory effort.  Cardiac: RRR  Neuro/MSK: Patient is alert and oriented to self, time, and location. Able to track finger with eyes. Light touch sensation intact on face, upper, and lower extremities bilaterally.  Face is symmetric with normal eye closure, smile, and puffing of cheeks. Shoulder shrug intact. 5/5 strength in upper extremities. 2/5 strength in hip and knee flexion bilaterally. L leg 2/5 strength for ankle flexion, R ankle flexion 3/5. Ambulates with wheelchair at baseline.      Medical Decision Making       Please see A&P for additional details of medical decision making.      Data   ------------------------- PAST 24 HR DATA REVIEWED -----------------------------------------------    I have personally reviewed the following data over the past 24 hrs:    3.6 (L)  \   9.8 (L)   / 286     141 108 (H) 12.6 /  100 (H)   4.1 21 (L) 0.84 \       Imaging results reviewed over the past 24 hrs:   No results found for this or any previous visit (from the past 24 hours).

## 2025-04-11 NOTE — PROGRESS NOTES
Antimicrobial Stewardship Team Note    Antimicrobial Stewardship Program - A joint venture between [facility] and  Physicians to optimize antibiotic management.  NOT a formal consult - Restricted Antimicrobial Review     Patient: Marylee Woods  MRN: [id number]  Allergies: Bactrim [sulfamethoxazole-trimethoprim], Hydrochlorothiazide, Sulfamethizole, Amantadine, Budesonide, and Budesonide-formoterol fumarate    Brief Summary: Marylee Woods is a 69 year-old female with PMH MS, neurogenic bladder with incontinence, frequent UTIs, CKD 3, HSV, HTN, HLP, GERD, MDD with anxiety, hypothyroidism, MARYLU, known lung nodules, and breast cancer s/p lumpectomy with a recent acute cystitis hospitalization 3/2025 who presented to ED from TCU with recurrent episodes of encephalopathy.    History of Present Illness: On 4/8, patient presented to the ED with intermittent episodes of decreased mentation over the last few months lasting hours to a day, but more recently lasting multiple days. Labs upon arrival include WBC 6.6, ammonia 11, CRP 6.5, ESR 46, lactate 1.3, and LFTs WNL. In the ED, she was hemodynamically stable with -160/60-70s, HR 80-90s, and afebrile. Given concern for toxic or drug-induced encephalopathy, multiple PTA meds were held upon admission. CT imaging demonstrated no intracranial structural abnormality or hemorrhage. UA was positive for large leukocyte esterase and elevated WBC. Blood and catheter urine cultures were collected on 4/8, and the patient was then started on Zosyn. Verduzco catheter was removed yesterday for a trial of voiding.  Currently, blood cultures show no growth to date, and urine cultures have grown 10-50k Enterobacter cloacae, 10-50k Enterococcus faecalis, and <10k Staph haemolyticus x 2. Susceptibility testing revealed resistance to Zosyn by Enterobacter. During the course of hospitalization, the patient has remained afebrile and vitally stable with labile WBC count.  Following initial presentation, WBC jeffrey to 12.4 on 4/8, but was WNL the following day (7.9). Most recent WBC of 3.6. Her mentation continues to improve, and she remains on Zosyn.          Active Anti-infective Medications   (From admission, onward)                 Start     Stop    04/09/25 1330  piperacillin-tazobactam  4.5 g,   Intravenous,   EVERY 6 HOURS        Sepsis       --    04/08/25 2000  acyclovir  400 mg,   Oral,   2 TIMES DAILY        Prophylaxis of Herpes Simplex       --                  Assessment: Acute encephalopathy 2/2 toxic metabolite vs medication vs infection  Patient's mentation is currently improving; however, cause of acute encephalopathy has not been determined. Blood cultures have resulted negative, and urine cultures have grown bacteria with 2 species meeting catheter-associated UTI cutoff of >/= 10k CFU/mL. Due to low inoculum, growth of Staphylococcus haemolyticus likely represents colonization. Today is day 4 of Zosyn therapy, and antibiotic susceptibility shows resistance to Zosyn by Enterobacter cloacae and Staphylococcus haemolyticus x2. As the patient has improved while on ineffective or insufficient antibiotic therapy, suspicion for UTI etiology is low. Additionally, given recent cazares removal, IDSA CAUTI guidelines suggest a 3-day antimicrobial regimen may be sufficient for women </= 65 years who develop CAUTI without upper urinary tract symptoms after an indwelling catheter has been removed. While the patient does not meet the age cutoff of this recommendation, it is reassuring given she has received 4 days of therapy thus far.  If improving mentation is associated with the treatment of Enterococcus faecalis and continued UTI treatment is necessary, deescalation from Zosyn is recommended due to resistance. For alternative antibiotic options, ciprofloxacin would be suitable following review of bacterial susceptibility. While Staphylococcus haemolyticus is listed as resistant,  colony cultures do not meet the CAUTI cutoff of at least 10k CFU/mL; therefore, treatment of this species is not imperative as it likely represents colonization.    Recommendations:  Discontinue antibiotic therapy as the patient improved on ineffective or insufficient antibiotics given resistance of Zosyn by 3/4 isolates  If antibiotic therapy is deemed necessary, deescalate Zosyn to ciprofloxacin 500 mg BID through 4/12 (i.e. 3 doses)    Pharmacy took the following actions: Electronic note created.    Discussed with ID Staff GUSTAVO Freedman and Arnaldo Davalos [facility]  Lisha Qiu, P4 pharmacy student    Vital Signs/Clinical Features:  Vitals         04/09 0700  04/10 0659 04/10 0700 04/11 0659 04/11 0700  04/11 1232   Most Recent      Temp ( F) 97.8 -  98.3    98.4 -  98.7      98.3     98.3 (36.8) 04/11 0806    Pulse 101 -  105    86 -  104      82     82 04/11 0806    Resp 16 -  20    18 -  20      18     18 04/11 0806    /71 -  149/80    99/49 -  122/65      133/63     133/63 04/11 0806    SpO2 (%) 97 -  100    96 -  100      99     99 04/11 0806            Labs  Estimated Creatinine Clearance: 70.4 mL/min (based on SCr of 0.84 mg/dL).  Recent Labs   Lab Test 03/26/25  1911 04/08/25  0928 04/08/25  0930 04/09/25  0723 04/10/25  1003 04/11/25  0812   CR 0.90 1.03* 1.3* 0.86 0.77 0.84       Recent Labs   Lab Test 10/31/18  1859 11/01/18  0556 11/12/18  0622 11/15/18  0711 11/27/18  0933 07/03/20  1525 06/30/21  1717 07/01/21  0713 07/02/21  0727 03/09/23  0903 03/26/25  1911 04/08/25  0928 04/08/25  2229 04/09/25  0907 04/10/25  1003 04/10/25  2345 04/11/25  0812   WBC 7.1   < > 3.2*  --  3.2*   < > 8.1 10.4 9.8   < > 5.3 6.6 12.4* 7.9 8.3  --  3.6*   ANEU 6.1  --  2.4  --  2.5  --  6.3 8.8* 8.2  --   --   --   --   --   --   --   --    ALYM 0.5*  --  0.4*  --  0.4*  --  1.4 1.0 0.9  --   --   --   --   --   --   --   --    JENNIFER 0.4  --  0.2  --  0.2  --  0.3 0.4 0.3  --   --   --   --   --    --   --   --    AEOS 0.1  --  0.1  --  0.1  --  0.1 0.2 0.2  --   --   --   --   --   --   --   --    HGB 9.9*   < > 7.4*  --  9.6*   < > 14.7 12.2 12.6   < > 11.0* 11.4* 11.9 11.6* 11.4*  --  9.8*   HCT 30.7*   < > 23.3*  --  30.6*   < > 43.0 34.1* 36.6   < > 34.1* 36.5 37.2 37.2 39.0  --  30.7*   MCV 95   < > 94  --  96   < > 96 92 97   < > 90 93 92 93 101*  --  90   *   < > 204   < > 179   < > 194 149* 164   < > 302 380 369 305 216 302 286    < > = values in this interval not displayed.       Recent Labs   Lab Test 04/05/24  1307 06/11/24  0900 12/02/24 1007 03/09/25  0916 03/10/25  0807 03/20/25  1303 03/26/25 1911 04/08/25  0928   BILITOTAL 0.2  --   --  0.5 0.4 0.2 0.2 0.2   ALKPHOS 89  --   --  69 56 83 127 93   ALBUMIN 4.1   < > 4.0 4.2 3.4* 3.8 3.9 4.1   AST 22  --   --  21 14 23 19 21   ALT 12  --   --  14 10 12 10 11    < > = values in this interval not displayed.       Recent Labs   Lab Test 11/01/17  0047 06/30/21  1717 07/01/21  0243 11/18/23  0532 11/18/23  0533 01/06/24  0938 04/05/24  1307 03/09/25  0916 03/26/25 1911 04/08/25  0928 04/08/25 1007 04/08/25  1254   PCAL <0.05  --   --  0.20  --   --   --   --   --  0.12  --   --    LACT 1.2  --   --   --    < > 1.0 0.9 0.9 1.1  --  1.3 0.7   CRP  --  <2.9  --   --   --   --   --   --   --   --   --   --    CRPI  --   --   --   --   --   --   --   --  11.02* 6.50*  --   --    SED  --   --  17  --   --   --   --   --   --  46*  --   --     < > = values in this interval not displayed.             Culture Results:  7-Day Micro Results       Procedure Component Value Units Date/Time    Urine Culture [[id number]]  (Abnormal)  (Susceptibility) Collected: 04/08/25 1056    Order Status: Completed Lab Status: Edited Result - FINAL Updated: 04/11/25 0737    Specimen: Urine, Catheter      Culture 10,000-50,000 CFU/mL Enterobacter cloacae complex      10,000-50,000 CFU/mL Enterococcus faecalis      <10,000 CFU/mL Staphylococcus haemolyticus       "<10,000 CFU/mL Staphylococcus haemolyticus    Narrative:      ID and Susceptibility testing requested by Dr. Moiz De Los Santos Phone [phone]      Multiple morphotypes present with no predominant organism.  Growth consistent with probable contamination during collection.  Suggest repeat specimen if clinically indicated.     Susceptibility       Staphylococcus haemolyticus (3)       Antibiotic Interpretation Sensitivity   Method Status    Cefoxitin Screen  [*]  Resistant   DISK DIFFUSION SUSCEPTIBILITY Final    Oxacillin Resistant >=4 ug/mL VICKI Final     Oxacillin susceptible isolates are susceptible to cephalosporins (example: cefazolin and cephalexin) and beta lactam combination agents. Oxacillin resistant isolates are resistant to these agents.       Gentamicin Resistant >=16 ug/mL VICKI Final    Ciprofloxacin Resistant >=8 ug/mL VICKI Final    Levofloxacin Resistant >=8 ug/mL VICKI Final    Inducible macrolide resistance test  [*]  Negative Negative ug/mL VICKI Final    Erythromycin  [*]  Resistant >=8 ug/mL VICKI Final    Clindamycin  [*]  Resistant >=4 ug/mL VICKI Final    Linezolid  [*]  Susceptible 2 ug/mL VICKI Final    Vancomycin Susceptible 1 ug/mL VICKI Final    Daptomycin Susceptible 1 ug/mL VICKI Final    Tetracycline Susceptible 2 ug/mL VICKI Final    Doxycycline Susceptible 1 ug/mL VICKI Final    Tigecycline  [*]  No interpretation available <=0.12 ug/mL VICKI Final    Nitrofurantoin Susceptible 32 ug/mL VICKI Final    Rifampin  [*]  Resistant >=32 ug/mL VICKI Final    Trimethoprim/Sulfamethoxazole Resistant   VICKI Final      Susceptibility Comments            Antibiotics listed as \"No Interpretation\" have no regulatory guidelines for susceptibility/resistance available.                      Staphylococcus haemolyticus (4)       Antibiotic Interpretation Sensitivity   Method Status    Cefoxitin Screen  [*]  Resistant   DISK DIFFUSION SUSCEPTIBILITY Final    Oxacillin Resistant >=4 ug/mL VICKI Final     Oxacillin susceptible isolates " "are susceptible to cephalosporins (example: cefazolin and cephalexin) and beta lactam combination agents. Oxacillin resistant isolates are resistant to these agents.       Gentamicin Resistant >=16 ug/mL VICKI Final    Ciprofloxacin Resistant >=8 ug/mL VICKI Final    Levofloxacin Resistant >=8 ug/mL VICKI Final    Inducible macrolide resistance test  [*]  Negative Negative ug/mL VICKI Final    Erythromycin  [*]  Resistant >=8 ug/mL VICKI Final    Clindamycin  [*]  Resistant >=4 ug/mL VICKI Final    Linezolid  [*]  Susceptible 2 ug/mL VICKI Final    Vancomycin Susceptible 2 ug/mL VICKI Final    Daptomycin Susceptible 0.5 ug/mL VICKI Final    Tetracycline Susceptible 2 ug/mL VICKI Final    Doxycycline Susceptible 1 ug/mL VICKI Final    Tigecycline  [*]  No interpretation available 0.25 ug/mL VICKI Final    Nitrofurantoin Susceptible 32 ug/mL VICKI Final    Rifampin  [*]  Resistant >=32 ug/mL VICKI Final    Trimethoprim/Sulfamethoxazole Resistant   VICKI Final      Susceptibility Comments            Antibiotics listed as \"No Interpretation\" have no regulatory guidelines for susceptibility/resistance available.                      Enterobacter cloacae complex (1)       Antibiotic Interpretation Sensitivity   Method Status    Ampicillin Resistant   VICKI Final     Intrinsically Resistant       Ampicillin/ Sulbactam Resistant   VICKI Final     Intrinsically Resistant       Piperacillin/Tazobactam Resistant   VICKI Final    Cefazolin Resistant   VICKI Final     Intrinsically Resistant       Ceftazidime Resistant   VICKI Final    Ceftriaxone Resistant   VICKI Final    Cefepime Susceptible Dose Dependent 8 ug/mL VICKI Final    Ertapenem  [*]  Intermediate 1 ug/mL VICKI Final    Meropenem  [*]  Susceptible <=0.25 ug/mL VICKI Final    Gentamicin Susceptible <=1 ug/mL VICKI Final    Ciprofloxacin Susceptible <=0.06 ug/mL VICKI Final    Levofloxacin Susceptible <=0.12 ug/mL VICKI Final    Nitrofurantoin Resistant 256 ug/mL VICKI Final    Trimethoprim/Sulfamethoxazole Susceptible " <=1/19 ug/mL VICKI Final      Susceptibility Comments       Enterobacter cloacae, Klebsiella aerogenes, and Citrobacter freundii have moderate to high levels of inducible AmpC ß-lactamase expression. The use of 3rd generation cephalosporins including ceftriaxone and ceftazidime, as well as   piperacillin-tazobactam, should be avoided for invasive infections, regardless of susceptibility results.                      Enterococcus faecalis (2)       Antibiotic Interpretation Sensitivity   Method Status    Ampicillin Susceptible <=2 ug/mL VICKI Final    Gentamicin Synergy  [*]  Susceptible Susceptible ug/mL VICKI Final     No high level gentamicin resistance found - therefore combination therapy with an aminoglycoside may be indicated for serious enterococcal infections such as bacteremia and endocarditis.       Streptomycin Synergy  [*]  Susceptible Susceptible ug/mL VICKI Final    Ciprofloxacin  [*]  Susceptible 1 ug/mL VICKI Final    Levofloxacin  [*]  Susceptible 1 ug/mL VICKI Final    Linezolid  [*]  Susceptible 2 ug/mL VICKI Final    Vancomycin Susceptible 1 ug/mL VICKI Final    Daptomycin  [*]  Susceptible 2 ug/mL VICKI Final    Doxycycline  [*]  Intermediate 8 ug/mL VICKI Final    Tigecycline  [*]  Susceptible <=0.12 ug/mL VICKI Final    Nitrofurantoin Susceptible <=16 ug/mL VICKI Final               [*]  Suppressed Antibiotic                   Blood Culture Peripheral Blood [[id number]]  (Normal) Collected: 04/08/25 1007    Order Status: Completed Lab Status: Preliminary result Updated: 04/11/25 1206    Specimen: Peripheral Blood      Culture No growth after 3 days    Blood Culture Peripheral Blood [[id number]]  (Normal) Collected: 04/08/25 1007    Order Status: Completed Lab Status: Preliminary result Updated: 04/11/25 1206    Specimen: Peripheral Blood      Culture No growth after 3 days            Recent Labs   Lab Test 03/06/25  0700 03/09/25  1105 03/20/25  1147 03/26/25  1100 04/08/25  1056   URINEPH 5.5 5.5 7.0 7.0 8.0*    NITRITE Negative Negative Negative Negative Negative   LEUKEST Trace* Negative Large* Large* Large*   WBCU 10-25* 5 >100* 25-50* 71*                         Imaging: EEG Video 2-12 HRS Ummonitored    Result Date: 4/10/2025  EEG Video 2-12 HRS Ummonitored Result VIDEO EEG DATE: 4/10/2025 VIDEO EEG [id number] VIDEO EEG DAY#: 2 VIDEO EEG SOURCE FILE DURATION: 7 hoiurs 41 min PATIENT INFORMATION: Marylee Woods is a 69 year old year old female who presents with seizure like activities. EEG is being done to evaluate for seizures.  MEDICATIONS: Nuvigil held Baclofen 20 mg once @ 0058 Cymbalta 80 mg qd Gabapentin held  TECHNICAL SUMMARY: This is a video-EEG monitoring study. EEG was recorded from 23 scalp electrodes placed according to the 10-20 international system. Additional electrodes were utilized for referencing, artifact detection, and recording from other cerebral regions. Qualified technicians attached EEG electrodes, set up the study, monitored and reviewed EEG recordings, and disconnected EEG electrodes when appropriate. Video was continuously recorded. Video was reviewed for clinical correlation and to assist with EEG interpretations.  RESULTS: BACKGROUND ACTIVITIES: During maximal wakefulness, there is symmetric, moderate amplitude poorly formed 8 - 8 1/2 Hz posterior dominant rhythm, which attenuates with eye opening. Sleep stages were recorded with poorly formed sleep architectures. ACTIVATION PROCEDURES: Hyperventilation and photic stimulations were not performed. INTERICTAL ACTIVITIES: There are no epileptiform abnormalities. ICTAL ACTIVITIES: There are no electrographic seizures during this recording. Impression:  This is a normal waking and sleep EEG. Summary of 2 days of VEE monitoring:  This is a normal waking and sleep EEG. Patient had a few spells of vertigo during day #1, with no EEG correlations. Yuriy Ojeda MD EPILEPSY STAFF     EEG Video 12-26 hr Unmonitored    Result Date: 4/10/2025  EEG  Video 12-26 hr Unmonitored Result VIDEO EEG DATE: 25 VIDEO EEG [id number] VIDEO EEG DAY#: 2 VIDEO EEG SOURCE FILE DURATION: 13 hours 53 min PATIENT INFORMATION: Marylee Woods is a 69 year old year old female who presents with seizure like activities. EEG is being done to evaluate for seizures. MEDICATIONS: Nuvigil held Baclofen 20 mg once @ 0058 Cymbalta 80 mg qd Gabapentin held TECHNICAL SUMMARY: This is a video-EEG monitoring study. EEG was recorded from 23 scalp electrodes placed according to the 10-20 international system. Additional electrodes were utilized for referencing, artifact detection, and recording from other cerebral regions. Qualified technicians attached EEG electrodes, set up the study, monitored and reviewed EEG recordings, and disconnected EEG electrodes when appropriate. Video was continuously recorded. Video was reviewed for clinical correlation and to assist with EEG interpretations. RESULTS: BACKGROUND ACTIVITIES: During maximal wakefulness, there is symmetric, moderate amplitude poorly formed 8 - 8 1/2 Hz posterior dominant rhythm, which attenuates with eye opening. Sleep stages were recorded with poorly formed sleep architectures. ACTIVATION PROCEDURES: Hyperventilation and photic stimulations were not performed. INTERICTAL ACTIVITIES: There are no epileptiform abnormalities. ICTAL ACTIVITIES: There are no electrographic seizures during this recording. Patient had a few spells of vertigo at 16:29, 19:06, 21:25, with no EEG correlations. Impression:  This is a normal waking and sleep EEG. Patient had a few spells of vertigo, with no EEG correlations. Yuriy Ojeda MD EPILEPSY STAFF     CTA Head Neck with Contrast    Result Date: 2025  EXAM: CTA HEAD NECK W CONTRAST  2025 10:11 AM HISTORY:  stroke?   COMPARISON:  MRI 3/21/2025 TECHNIQUE: HEAD and NECK CTA: During rapid bolus intravenous injection of nonionic contrast material, axial images were obtained using thin collimation  multidetector helical technique from the base of the upper aortic arch through the cranial vertex. This CT angiogram data was reconstructed at thin intervals with mild overlap. Images were sent to the 3D workstation, and 3D reconstructions were obtained. The axial source images, multiplanar reformations, 3D reconstructions in both maximum intensity projection display and volume rendered models were reviewed, with reconstructions performed by the technologist. CONTRAST: iopamidol (ISOVUE-370) solution 67 mL FINDINGS: Head CTA demonstrates no large vessel arterial occlusion or stenosis of the major intracranial arteries. 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 Neck CTA demonstrates patent great vessel origins arising from aortic arch. Minimal atherosclerotic plaque of the bilateral carotid bulbs without associated stenosis. No vertebral artery stenosis. No acute finding in the visualized neck soft tissues or in the superior mediastinum/thorax.     IMPRESSION:  1. Head CTA demonstrates no stenosis of the major intracranial arteries. 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 2. Neck CTA demonstrates no stenosis of the major cervical arteries I have personally reviewed the examination and initial interpretation and I agree with the findings. ESTEFANI COLE MD   SYSTEM ID:  [id number]    Head CT w/o contrast    Result Date: 4/8/2025  CT HEAD W/O CONTRAST 4/8/2025 9:59 AM History:  stroke?  Comparison: MRI 3/21/2025 Technique: Using multidetector thin collimation helical acquisition technique, axial, coronal and sagittal CT images from the skull base to the vertex were obtained without intravenous contrast. Findings: There is no intracranial hemorrhage, mass effect or midline shift. There is no focal loss of gray-white matter differentiation, insular ribbon sign, or  focally hyperdense artery to suggest acute infarction. The ventricles are proportionate to the cerebral sulci. Generalized parenchymal volume loss. Periventricular and subcortical white matter hypodensities, likely due to chronic small vessel ischemic disease. The bony calvaria and the bones of the skull base appear normal. The visualized portions of the paranasal sinuses are clear. Chronic dependent right mastoid effusion.     Impression: 1. No acute intracranial hemorrhage. 2. Mild to moderate parenchymal volume loss and sequelae of chronic small vessel ischemic disease. [Stroke Code Result: Negative] Finding was identified on 4/8/2025 10:01 AM. Dr. Mahan was contacted by Dr. Thorpe on 4/8/2025 10:22 AM via [employer] [facility] and responded with understanding of results. [facility] Stroke Code Communication Guidelines: [place] ED: [phone] ([facility] ED) [facility] Inpatient: [phone] (Resident Pager)  or [place] 'Stroke First Call Resident [[facility]]' or [employer] 0979 [facility] ED: [phone] ([facility] ED) [facility] Inpatient: Page [phone] I have personally reviewed the examination and initial interpretation and I agree with the findings. ESTEFANI COLE MD   SYSTEM ID:  [id number]

## 2025-04-11 NOTE — PROGRESS NOTES
1530 - 1930    Outcome Evaluation: Pt alert and oriented x4. R/A. VSS. afebrile. Repositioned q 2 hours. Skin intact. Ate 100% of meals. Appetite minimal. Pt oliguric. MD aware. 300 cc in the conainer. Purewick intact. Pt did have a BM this am.passing gas. No further concerns at this time. will continue to monitor alonzo.

## 2025-04-11 NOTE — CONSULTS
General ID Green Service: Consult Note     Patient:  Marylee Woods, Date of birth 1955, Medical record number [id number]  Date of Visit:  04/11/2025  Reason for consult: positive urine culture         Assessment and Recommendations:     ID Problem list:  Positive urine culture with mixed organisms 4/8 (E.cloacae, E.faecalis, Staph haemolyticus)  Sulfa allergy  Multiple sclerosis  Encephalopathy   DENNISE on CKD      Discussion:  Patient currently awake/alert/oriented and yet currently denies any urinary complaints (including no dysuria, no suprapubic pain). She also has no fevers to otherwise suggest systemic infection. Her blood cultures were negative. Her urine culture from 4/8 (collected from catheterization) grew mixed bacteria which is suggestive of possible colonization vs contamination. Thus, overall would agree with Antimicrobial Stewardship Team that no clear infection warranting antibiotics has been clearly identified at this time. However, if there is high ongoing clinical concern for UTI or if patient develops urinary symptoms then would agree that, of the mixed organisms that grew in the 4/8 urine culture, that E.cloacae would be the most likely pathogen implicated such that ciprofloxacin (a short 3-day course for cystitis given her lack of fevers or positive blood cultures and is now s/p indwelling cazares removal) would be reasonable option if that were the case . Agree with neurology evaluation for alternative causes of her AMS (polypharmacy/drug-induced, metabolic, MS, etc) as well; if neuro has concern for CNS infection the would agree LP/MRI brain would be warranted (which patient has thus far declined).       Recs:  Lower concern for UTI at current time given lack of urinary symptoms currently so low suspicion for UTI to be cause of her clinical status  If patient develops urinary symptoms if high ongoing concern for UTI, then would agree with AST team that 3-day course PO cipro would be  reasonable if that were the case            Case discussed with primary team. Thank you for allowing us to participate in the care of this patient. Green Inpatient ID service will sign off. Can see [facility] coverage schedule for paging details if questions.     80 minutes spent by me on the date of the encounter doing chart review, history and exam, documentation and further activities per the note. This patient visit is eligible for billing by the  code due to complex infectious disease decision making, antimicrobial therapy, and management by an Infectious Diseases physician.     Steffen Cohn MD    Infectious Diseases   04/11/2025           History of Present Illness:     69F with PMH including MS, neurogenic bladder with incontinence, prior UTIs, CKD 3, HSV, HTN, HLP, GERD, MDD with anxiety, hypothyroidism, MARYLU, and breast cancer s/p lumpectomy who presented to ED from TCU 4/8 with recurrent episodes of encephalopathy.    Per report, she has had multiple episodes of lethargy/AMS over last few months lasting hours to days, including during this most recent episode at the TCU that prompted her hospitalization. On admission, she was afebrile, hemodynamically stable, CT head was unrevealing for acute pathology, labs revealed DENNISE, blood cultures were negative, and UA was abnormal with urine culture (sent from catheterization) grew 10k-50k E.cloacae, 10k-50k E.faecalis, and <10k Staph haemolyticus. She was treated with IV zosyn (4/8-4/11) followed by ciprofloxacin (4/11-) when susceptibilities reveled E.cloacae resistant to zosyn but susceptible to cipro. Neuro and medicine teams have offered MRI brain and LP to further work up which patient has declined.     Patient currently denies dysuria or suprapubic pain. She did have a cazares for first few days, but says they removed it a few days ago and now she just has a purwick. She notes that the last time she was treated for UTI at [facility] that she did  have dysuria at that time which improved with amoxicillin, but no dysuria currently. No fevers but she chronically feels cold. No diarrhea. No neck stiffness or headache or photophobia (but she believes she did have this days prior).           Review of Systems:   ROS obtained, pertinent positives and negatives as above.       Past Medical History:     Past Medical History:   Diagnosis Date    Acute cystitis without hematuria     Acute pain of right knee     Atypical ductal hyperplasia of left breast 02/2022    Candida esophagitis - 2019 (H)     CKD (chronic kidney disease) stage 3, GFR 30-59 ml/min (H)     Community acquired pneumonia, unspecified laterality     Cough, unspecified type 04/05/2024    Depression     Epigastric pain     Former tobacco use     Fracture of femur, distal, left, closed (H)     Gastro-oesophageal reflux disease     Graves disease     History of fracture of fibula     History of tibial fracture     HSV (herpes simplex virus) infection     Hyperlipidemia with target LDL less than 130     Hyponatremia     L tib fx s/p IM nailing     Lung nodules     Currently managed with follow up imaging by [facility] result Team.       Multiple sclerosis (H)     Osteoporosis     Postablative hypothyroidism      Past Surgical History:   Procedure Laterality Date    C/SECTION, LOW TRANSVERSE  1992    COLONOSCOPY  2007    COLONOSCOPY N/A 1/26/2017    Procedure: COMBINED COLONOSCOPY, SINGLE OR MULTIPLE BIOPSY/POLYPECTOMY BY BIOPSY;  Surgeon: Mayo Adkins MD, MD;  Location:  GI    ESOPHAGOSCOPY, GASTROSCOPY, DUODENOSCOPY (EGD), COMBINED  1/26/2017    Dr. Adkins [facility]    ESOPHAGOSCOPY, GASTROSCOPY, DUODENOSCOPY (EGD), COMBINED N/A 1/26/2017    Procedure: COMBINED ESOPHAGOSCOPY, GASTROSCOPY, DUODENOSCOPY (EGD), BIOPSY SINGLE OR MULTIPLE;  Surgeon: Mayo Adkins MD, MD;  Location:  GI    ESOPHAGOSCOPY, GASTROSCOPY, DUODENOSCOPY (EGD), COMBINED N/A 4/20/2023    Procedure:  ESOPHAGOGASTRODUODENOSCOPY, WITH BIOPSY;  Surgeon: Cristina Zuniga MD;  Location: [facility] GI    HIP SURGERY  2009    femur ortho surgery    LAPAROSCOPIC CHOLECYSTECTOMY  6/24/2014    Procedure: LAPAROSCOPIC CHOLECYSTECTOMY;  Surgeon: Randy Bailey MD;  Location:  [facility]    LUMPECTOMY BREAST Left 3/31/2022    Procedure: LEFT Radiofrequency Identification Seed-Localized Lumpectomy;  Surgeon: Amanda Liu MD;  Location: [facility] OR    OPEN REDUCTION INTERNAL FIXATION FEMUR DISTAL Left 11/1/2018    Procedure: OPEN REDUCTION INTERNAL FIXATION LEFT FEMUR DISTAL;  Surgeon: Jose Valadez MD;  Location: [facility]    OPEN REDUCTION INTERNAL FIXATION RODDING INTRAMEDULLARY TIBIA  4/14/2013    Procedure: OPEN REDUCTION INTERNAL FIXATION RODDING INTRAMEDULLARY TIBIA;;  Surgeon: Sajan Cast MD;  Location: [facility]    ORTHOPEDIC SURGERY  2009    surgery right upper femur fx near hip     Otherwise as per HPI      Allergies:      Allergies   Allergen Reactions    Bactrim [Sulfamethoxazole-Trimethoprim] Hallucination    Hydrochlorothiazide      Hyponatremia    Sulfamethizole     Amantadine      hallucinations    Budesonide     Budesonide-Formoterol Fumarate Rash            Current Antimicrobials:   PO ciprofloxacin       Family History:   Reviewed and noncontributory       Social History:     Social History     Tobacco Use    Smoking status: Former     Current packs/day: 0.00     Types: Cigarettes     Quit date: 1/1/2022     Years since quitting: 3.2     Passive exposure: Current    Smokeless tobacco: Never    Tobacco comments:     Once in a while    Vaping Use    Vaping status: Never Used   Substance Use Topics    Alcohol use: Yes     Alcohol/week: 0.0 standard drinks of alcohol     Comment: occasional     Drug use: No     Otherwise as per HPI         Physical Exam:   Ranges forvital signs:  Temp:  [98.3  F (36.8  C)-98.7  F (37.1  C)] 98.5  F (36.9  C)  Pulse:  [] 104  Resp:  [16-18] 16  BP:  ()/(49-66) 136/66  SpO2:  [96 %-100 %] 100 %  GENERAL:  adult female lying in bed in no acute distress.   ENT:  Head is normocephalic, atraumatic.   EYES:  No conjunctival injection.  NECK:  Supple.  LUNGS:  Unlabored breathing on room air.  ABDOMEN:  Non-distended, soft, nontender.  : no suprapubic tenderness; no CVA tenderness  MSK: +left lower back pain (not in CVA area)  SKIN:  No acute rashes visible on exposed skin.   NEUROLOGIC:  Awake, alert, interactive.   PSYCH: anxious. Answering questions.          Laboratory Data:     Inflammatory Markers    Recent Labs   Lab Test 04/08/25  0928 03/26/25  1911 07/01/21  0243   SED 46*  --  17   CRPI 6.50* 11.02*  --        Hematology Studies    Recent Labs   Lab Test 04/11/25  0812 04/10/25  2345 04/10/25  1003 04/09/25  0907 04/08/25  2229 04/08/25  0928 03/26/25  1911 03/09/23  0903 07/02/21  0727 07/01/21  0713 06/30/21  1717 07/03/20  1525 11/27/18  0933 11/15/18  0711 11/12/18  0622 11/01/18  0556 10/31/18  1859   WBC 3.6*  --  8.3 7.9 12.4* 6.6 5.3   < > 9.8 10.4 8.1   < > 3.2*  --  3.2*   < > 7.1   ANEU  --   --   --   --   --   --   --   --  8.2 8.8* 6.3  --  2.5  --  2.4  --  6.1   AEOS  --   --   --   --   --   --   --   --  0.2 0.2 0.1  --  0.1  --  0.1  --  0.1   HGB 9.8*  --  11.4* 11.6* 11.9 11.4* 11.0*   < > 12.6 12.2 14.7   < > 9.6*  --  7.4*   < > 9.9*   MCV 90  --  101* 93 92 93 90   < > 97 92 96   < > 96  --  94   < > 95    302 216 305 369 380 302   < > 164 149* 194   < > 179   < > 204   < > 124*    < > = values in this interval not displayed.       Metabolic Studies     Recent Labs   Lab Test 04/11/25  0812 04/10/25  1003 04/09/25  0723 04/08/25  0930 04/08/25  0928 03/26/25  1911    139 140  --  141 140   POTASSIUM 4.1 4.7 4.8  --  4.6 4.9   CHLORIDE 108* 106 105  --  103 102   CO2 21* 18* 23  --  25 28   BUN 12.6 15.1 17.5  --  18.3 17.4   CR 0.84 0.77 0.86 1.3* 1.03* 0.90   GFRESTIMATED 75 83 73 44* 59* 69       Hepatic  Studies    Recent Labs   Lab Test 04/08/25  0928 03/26/25  1911 03/20/25  1303 03/10/25  0807 03/09/25  0916 12/02/24  1007 06/11/24  0900 04/05/24  1307   BILITOTAL 0.2 0.2 0.2 0.4 0.5  --   --  0.2   ALKPHOS 93 127 83 56 69  --   --  89   ALBUMIN 4.1 3.9 3.8 3.4* 4.2 4.0   < > 4.1   AST 21 19 23 14 21  --   --  22   ALT 11 10 12 10 14  --   --  12    < > = values in this interval not displayed.       Microbiology:  Culture   Date Value Ref Range Status   04/08/2025 10,000-50,000 CFU/mL Enterobacter cloacae complex (A)  Corrected   04/08/2025 10,000-50,000 CFU/mL Enterococcus faecalis (A)  Corrected   04/08/2025 <10,000 CFU/mL Staphylococcus haemolyticus (A)  Corrected   04/08/2025 <10,000 CFU/mL Staphylococcus haemolyticus (A)  Corrected   04/08/2025 No growth after 3 days  Preliminary   04/08/2025 No growth after 3 days  Preliminary   03/26/2025 No Growth  Final   03/26/2025 No Growth  Final   03/26/2025 <10,000 CFU/mL Mixture of Urogenital Germaine  Final   03/20/2025 (A)  Final    <10,000 CFU/mL Non lactose fermenting gram negative bacilli   03/20/2025 <10,000 CFU/mL Gram positive cocci (A)  Final   03/20/2025 <10,000 CFU/mL Yeast (A)  Final   03/20/2025 (A)  Final    <10,000 CFU/mL Non lactose fermenting gram negative bacilli   03/09/2025 No Growth  Final   03/09/2025 No Growth  Final   03/09/2025 No Growth  Final   03/06/2025 10,000-50,000 CFU/mL Enterococcus faecalis (A)  Final   10/29/2024 <10,000 CFU/mL Mixture of Urogenital Germaine  Final   10/16/2024 >100,000 CFU/mL Enterobacter cloacae complex (A)  Final   06/25/2024 50,000-100,000 CFU/mL Mixture of urogenital germaine  Final   04/17/2024 10,000-50,000 CFU/mL Staphylococcus epidermidis (A)  Final   04/17/2024 <10,000 CFU/mL Urogenital germaine  Final   04/02/2024 >100,000 CFU/mL Escherichia coli (A)  Final   01/15/2024 50,000-100,000 CFU/mL Candida albicans (A)  Final     Comment:     Susceptibilities not routinely done, refer to antibiogram to view typical  susceptibility profiles   2024 10,000-50,000 CFU/mL Candida albicans (A)  Final     Comment:     Susceptibilities not routinely done, refer to antibiogram to view typical susceptibility profiles   2024 No Growth  Final   2024 No Growth  Final   2024 50,000-100,000 CFU/mL Mixture of Urogenital Germaine  Final   2023 50,000-100,000 CFU/mL Klebsiella pneumoniae (A)  Final   2023 No Growth  Final   2023 No Growth  Final   2023 10,000-50,000 CFU/mL Klebsiella pneumoniae (A)  Final   10/05/2023 50,000-100,000 CFU/mL Klebsiella pneumoniae (A)  Final   2023 10,000-50,000 CFU/mL Mixture of urogenital germaine  Final   2023 >100,000 CFU/mL Mixture of urogenital germaine  Final   2022 >100,000 CFU/mL Escherichia coli (A)  Final   2021 >100,000 CFU/mL Escherichia coli (A)  Final   2021 >100,000 CFU/mL Escherichia coli (A)  Final       Urine Studies    Recent Labs   Lab Test 25  1056 25  1100 25  1147 25  1105 25  0700   LEUKEST Large* Large* Large* Negative Trace*   WBCU 71* 25-50* >100* 5 10-25*              Imagin/8/25 CTA head neck report:  IMPRESSION:    1. Head CTA demonstrates no stenosis of the major intracranial  arteries. 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  2. Neck CTA demonstrates no stenosis of the major cervical arteries

## 2025-04-11 NOTE — PLAN OF CARE
BP 99/49 (BP Location: Left arm)   Pulse 86   Temp 98.7  F (37.1  C) (Oral)   Resp 18   Wt 70.6 kg (155 lb 11.2 oz)   LMP 01/31/2004 (Approximate)   SpO2 96%   BMI 23.67 kg/m      Goal Outcome Evaluation:      Plan of Care Reviewed With: patient    Overall Patient Progress: no changeOverall Patient Progress: no change     A&Ox4, VSS on RA, Denies pain, EEG monitoring intact, PIV infusing TKO, Denies nausea, No BM, Purewick intact, x1 urine incontinence, 2A -- repos Q2, Regular diet, Labs reviewed.     Voiding since cazares removal.

## 2025-04-11 NOTE — CONSULTS
[facility]     PM&R CONSULT        Consulting Provider:   Reason for Consult: Assessment of rehabilitation   Location of Patient: [facility]  Date of Encounter: 4/11/2025   Date of Admission: 4/8/2025        ASSESSMENT/PLAN:    Ms. Marylee Woods is a right handed 69 year old female with a relevant PMH of multiple sclerosis, malignant neoplasm of left breast status postlumpectomy in 2022, MARYLU, hypertension, anemia of chronic disease, depression with a rehabilitation diagnosis of spasticity resulting in pain, decreased strength, imbalance, decreased tolerance to activity, functional impairments in mobility, functional impairments in gait, functional impairments in ADLs/iADLs.    Physical Medicine and Rehabilitation have been consulted to evaluate patient for spasticity management.    Recommendations:   #Spasticity  #Multiple Sclerosis  Acute encephalopathy thought to be infectious vs medication related. Agree that UTI is most likely source of encephalopathy, but the prerenal DENNISE could have also decreased clearance of both gabapentin and baclofen. In general I have more concern about her gabapentin dose contributing to AMS than the baclofen. Home regimen baclofen 20 mg qAM + 30 mg at bedtime + prn 20 mg qPM, gabapentin 600 mg QAM + 900 mg HS. Current regimen: gabapentin held. Baclofen at half dose - 10 mg qAM, 15 mg HS, no prn dose. Her spasticity has been managed with baclofen for many years (records only go back to 2009, was on 10-15 mg QID at that time). Given her recurrent acute encephalopathy, would prioritize addressing recurrent UTIs and AKIs, as baclofen and gabapentin are likely not the primary sources of her AMS, but a result of decreased drug clearance from DENNISE.   - Agree with neurology that, given frequent AKIs, would be beneficial to decrease gabapentin dose to 300-600mg when reinitiated. Pt would like to try not taking it for awhile to see if neuropathy symptoms  return.  - encouraged increasing hydration  - Recommend returning to home regimen of baclofen: 20mg QAM + 30mg at bedtime (evening 20mg PRN), monitor for AMS but she has tolerated this dose for a long time  - Please ensure pt has follow up with her neurologist that manages her MS  - recommend PT and OT now for reconditioning and assessment for disposition safety.  - spouse will bring patients AFOs in for future therapy sessions    Patient reviewed with attending physician, Dr. Lawrence, who agrees with the assessment and plan.    Marguerite Ricks MD  N PM&R PGY-2  04/11/2025  Pager #: [phone]        HPI:    The patient is a right handed 69 year old female with a PMH of multiple sclerosis, spastic paraplegia, HSV, malignant neoplasm of left breast status post lumpectomy 2022, MARYLU, hypertension, anemia of chronic disease, depression admitted on 4/8/2025 for altered mental status determined to be acute encephalopathy likely due to UTI versus DENNISE decreasing clearance of baclofen and gabapentin causing medication induced encephalopathy.    White blood cell count has improved with IV Zosyn.  Mentation has also improved.  EEG was unremarkable.  DENNISE has resolved,  thought to be prerenal. Baclofen and gabapentin initially held on admission due to DENNISE and encephalopathy, primary team restarted baclofen at half home dose yesterday, 10 mg in the morning, 15 mg in the evening.  Gabapentin has not been restarted.  Has been having headache admitted, neck pain which is atypical for her.  Also has muscle spasms and ankles.    Per chart review patient has been on baclofen at least since 2009, records do not appear to go back before then.  In 2009 she was on baclofen 10-15 mg 4 times daily.  She was also on Neurontin, Ditropan, interferon beta for her MS.    PM&R consulted for management of spasticity, discussion of the risk of baclofen given concern that it was involved in development of encephalopathy, and consideration of  alternative spasticity management options.     She was seen at bedside with her .  We discussed how the kidney injury can affect the processing of the baclofen.  Her ankles have been spasming. The gabapentin and baclofen at the reduced dose hasn't been very helpful.  Normally, on her full dose, the medications at bedtime reduce spasms and prevent night issues.  We discussed possibly delaying returning to her home dose of gabapentin.  Her  was concerned that she has not been out of bed.  PT and OT have not stopped by this admission.  We discussed a therapy regimen while in the hospital.    NURSING REPORT:  Outcome Evaluation: A&O x 4. Denies pain. Assist of 2 with lift. Wheelchair bound at baseline. Repositioned every 2 hours. Pressure injuty to coccyx/sacrum. PIV infusing intermitent ABX. External urinary catheter- oliguric team aware per notes. Bladder scan this shift 136 mL. Incontinent of B&B. Last BM 4/10/2025.     CURRENT PRECAUTIONS/RESTRICTIONS:  None    DVT PPX:  Per primary team - heparin currently    PREVIOUS LEVEL OF FUNCTION:  PT:  Self-Care  Equipment Currently Used at Home: grab bar, toilet, wheelchair, power, transfer board  Fall history within last six months: no   and daughter assist with most IADLs, she does have a power wheelchair she is independently mobile with. She doesn't usually go to the toilet; bowel movements are managed in bed.  She is independent with feeding and upper body hygiene.      CURRENT FUNCTION:   Timed Code Treatment Minutes: (not recorded)   Has not been assessed by PT.    OT:   Timed Code Treatment Minutes: (not recorded)  Has not been assessed by OT.    SLP: Has not been assessed by SLP.  Timed Code Treatment Minutes: (not recorded)    LIVING SITUATION/SUPPORT:  Patient lives with  and daughter in a house that is wheelchair accessible.      Support system includes  (hospice chaplain), adult children live nearby    Work status: Retired,  previously worked as a . Had to retire due to progression of MS symptoms.    PAST MEDICAL HISTORY:  Past Medical History:   Diagnosis Date    Acute cystitis without hematuria     Acute pain of right knee     Atypical ductal hyperplasia of left breast 02/2022    Candida esophagitis - 2019 (H)     CKD (chronic kidney disease) stage 3, GFR 30-59 ml/min (H)     Community acquired pneumonia, unspecified laterality     Cough, unspecified type 04/05/2024    Depression     Epigastric pain     Former tobacco use     Fracture of femur, distal, left, closed (H)     Gastro-oesophageal reflux disease     Graves disease     History of fracture of fibula     History of tibial fracture     HSV (herpes simplex virus) infection     Hyperlipidemia with target LDL less than 130     Hyponatremia     L tib fx s/p IM nailing     Lung nodules     Currently managed with follow up imaging by [facility] result Team.       Multiple sclerosis (H)     Osteoporosis     Postablative hypothyroidism        CURRENT MEDICATIONS:  Current Facility-Administered Medications   Medication Dose Route Frequency Provider Last Rate Last Admin    acetaminophen (TYLENOL) tablet 975 mg  975 mg Oral TID PRN Preethi Swain MD   975 mg at 04/09/25 2006    acyclovir (ZOVIRAX) tablet 400 mg  400 mg Oral BID Moiz De Los Santos MD   400 mg at 04/11/25 0824    amLODIPine (NORVASC) tablet 5 mg  5 mg Oral Daily Moiz De Los Santos MD   5 mg at 04/11/25 0826    [Held by provider] armodafinil (NUVIGIL) tablet TABS 200 mg  200 mg Oral QAM Moiz De Los Santos MD        baclofen (LIORESAL) tablet 10 mg  10 mg Oral Daily Moiz De Los Santos MD   10 mg at 04/11/25 0824    And    Baclofen (LIORESAL) tablet 15 mg  15 mg Oral QPM Moiz De Los Santos MD   15 mg at 04/10/25 1951    calcium carbonate (TUMS) chewable tablet 500 mg  500 mg Oral Daily Moiz De Los Santos MD   500 mg at 04/11/25 0825    DULoxetine (CYMBALTA) DR capsule 80 mg  80 mg Oral Daily Moiz De Los Santso MD   80 mg at  04/11/25 0825    famotidine (PEPCID) tablet 20 mg  20 mg Oral At Bedtime Moiz De Los Santos MD   20 mg at 04/10/25 2307    [Held by provider] gabapentin (NEURONTIN) capsule 600 mg  600 mg Oral QAM Moiz De Los Santos MD        [Held by provider] gabapentin (NEURONTIN) capsule 900 mg  900 mg Oral At Bedtime Moiz De Los Santos MD        heparin ANTICOAGULANT injection 5,000 Units  5,000 Units Subcutaneous Q8H JIMENEZ Moiz De Los Santos MD   5,000 Units at 04/11/25 0643    latanoprost (XALATAN) 0.005 % ophthalmic solution 1 drop  1 drop Both Eyes At Bedtime Moiz De Los Santos MD   1 drop at 04/10/25 2308    levothyroxine (SYNTHROID/LEVOTHROID) tablet 112 mcg  112 mcg Oral Daily Moiz De Los Santos MD   112 mcg at 04/11/25 0643    lidocaine (LMX4) cream   Topical Q1H PRN Moiz De Los Santos MD        lidocaine 1 % 0.1-1 mL  0.1-1 mL Other Q1H PRN Moiz De Los Santos MD        losartan (COZAAR) tablet 50 mg  50 mg Oral BID Moiz De Los Santos MD   50 mg at 04/11/25 0825    [Held by provider] magnesium oxide (MAG-OX) tablet 400 mg  400 mg Oral Daily Moiz De Los Santos MD        melatonin tablet 1 mg  1 mg Oral At Bedtime PRN Moiz De Los Santos MD        ondansetron (ZOFRAN ODT) ODT tab 4 mg  4 mg Oral Q6H PRN Moiz De Los Santos MD   4 mg at 04/09/25 1911    Patient is already receiving anticoagulation with heparin, enoxaparin (LOVENOX), warfarin (COUMADIN)  or other anticoagulant medication   Does not apply Continuous PRN Moiz De Los Santos MD        piperacillin-tazobactam (ZOSYN) 4.5 g vial to attach to  mL bag  4.5 g Intravenous Q6H Yandel Soto MEAGHAN   4.5 g at 04/11/25 0826    senna-docusate (SENOKOT-S/PERICOLACE) 8.6-50 MG per tablet 1 tablet  1 tablet Oral BID PRN Moiz De Los Santos MD        Or    senna-docusate (SENOKOT-S/PERICOLACE) 8.6-50 MG per tablet 2 tablet  2 tablet Oral BID PRN Moiz De Los Santos MD        sodium chloride (PF) 0.9% PF flush 3 mL  3 mL Intracatheter Q8H [facility] Moiz De Los Santos MD   3 mL at 04/11/25 0644    sodium chloride (PF) 0.9% PF flush 3 mL  3 mL Intracatheter q1  "min Moiz Skinner MD           EXAMINATION:  Vital signs:  Temp: 98.3  F (36.8  C) Temp src: Oral BP: 133/63 Pulse: 82   Resp: 18 SpO2: 99 % O2 Device: None (Room air)     Weight: 70.6 kg (155 lb 11.2 oz)  Estimated body mass index is 23.67 kg/m  as calculated from the following:    Height as of 4/7/25: 1.727 m (5' 8\").    Weight as of this encounter: 70.6 kg (155 lb 11.2 oz).    General: Awake, alert, sitting comfortably in bed  HEENT: Normocephalic, atraumatic, MMM  Cardiac: No cyanosis, trace LE edema bilaterally, extremities well perfused  Pulm: Breathing comfortably on RA, equal bilateral chest rise  GI: Nondistended  MSK/neuro:   Mental Status:  alert and oriented x3 to self, place, time (and reason)   Cranial Nerves: No obvious focal CN deficits on gross examination   Strength:    SF  EF  EE  WE  G  I  HF  KE  DF  EHL  PF   R  5 5 4 5 5 5 2 3 4 - 4  L  5  5 4 5 5 5 1 1 1 - 2   Wagoner's test: negative bilaterally    Tone per modified Hailey Scale: left wrist flexor 1+, left biceps 1+, left triceps 1+.   Abnormal movements: 2 beats clonus left ankle   ROM: mild contracture in right gastroc.  Inverted right ankle.  Inversion contracture right ankle.  Left ankle plantarflexion contracture 35 degrees short of neutral.   Speech: Clear, no aphasia, dysarthria  Reflexes:  Scored: _/4 Left Right   Patellar trace trace         LABS  BMP  Recent Labs   Lab 04/11/25  0812 04/10/25  1003 04/09/25  0723 04/08/25  0930 04/08/25  0928    139 140  --  141   POTASSIUM 4.1 4.7 4.8  --  4.6   CHLORIDE 108* 106 105  --  103   JORDON 9.4 9.4 9.5  --  9.8   CO2 21* 18* 23  --  25   BUN 12.6 15.1 17.5  --  18.3   CR 0.84 0.77 0.86 1.3* 1.03*   * 141* 120*  --  111*     CBC  Recent Labs   Lab 04/11/25  0812 04/10/25  2345 04/10/25  1003 04/09/25  0907 04/08/25  2229   WBC 3.6*  --  8.3 7.9 12.4*   RBC 3.42*  --  3.85 4.00 4.06   HGB 9.8*  --  11.4* 11.6* 11.9   HCT 30.7*  --  39.0 37.2 37.2   MCV 90  --  101* 93 92 "   MCH 28.7  --  29.6 29.0 29.3   MCHC 31.9  --  29.2* 31.2* 32.0   RDW 14.6  --  14.1 14.1 14.0    302 216 305 369     INR   Recent Labs   Lab 04/08/25  0930   INR 1.1       IMAGING  No results found for this or any previous visit (from the past 24 hours).

## 2025-04-11 NOTE — PLAN OF CARE
BP 99/49 (BP Location: Left arm)   Pulse 86   Temp 98.7  F (37.1  C) (Oral)   Resp 18   Wt 70.6 kg (155 lb 11.2 oz)   LMP 01/31/2004 (Approximate)   SpO2 96%   BMI 23.67 kg/m        Outcome Evaluation: A&O x 4. Denies pain. Assist of 2 with lift. Wheelchair bound at baseline. Repositioned every 2 hours. Pressure injuty to coccyx/sacrum. PIV infusing intermitent ABX. External urinary catheter- oliguric team aware per notes. Bladder scan this shift 136 mL. Incontinent of B&B. Last BM 4/10/2025.      Goal Outcome Evaluation:Ongoing      Plan of Care Reviewed With: patient    Overall Patient Progress: no change        Problem: Adult Inpatient Plan of Care  Goal: Absence of Hospital-Acquired Illness or Injury  Intervention: Identify and Manage Fall Risk  Recent Flowsheet Documentation  Taken 4/11/2025 0100 by Veronique Herrera RN  Safety Promotion/Fall Prevention:   activity supervised   assistive device/personal items within reach   clutter free environment maintained   lighting adjusted   room near nurse's station   room organization consistent   safety round/check completed   supervised activity     Problem: Adult Inpatient Plan of Care  Goal: Absence of Hospital-Acquired Illness or Injury  Intervention: Prevent Skin Injury  Recent Flowsheet Documentation  Taken 4/11/2025 0100 by Veronique Herrera, RN  Skin Protection:   adhesive use limited   tubing/devices free from skin contact     Problem: Adult Inpatient Plan of Care  Goal: Absence of Hospital-Acquired Illness or Injury  Intervention: Prevent Infection  Recent Flowsheet Documentation  Taken 4/11/2025 0100 by Veronique Herrera, RN  Infection Prevention:   cohorting utilized   rest/sleep promoted   single patient room provided     Problem: Adult Inpatient Plan of Care  Goal: Optimal Comfort and Wellbeing  Intervention: Monitor Pain and Promote Comfort  Recent Flowsheet Documentation  Taken 4/11/2025 0100 by Veronique Herrera, RN  Pain Management Interventions:   rest    repositioned     Problem: Delirium  Goal: Improved Sleep  Intervention: Promote Sleep  Recent Flowsheet Documentation  Taken 4/11/2025 0209 by Veronique Herrera, RN  Sleep/Rest Enhancement:   awakenings minimized   room darkened   regular sleep/rest pattern promoted     Problem: Delirium  Goal: Improved Behavioral Control  Intervention: Minimize Safety Risk  Recent Flowsheet Documentation  Taken 4/11/2025 0100 by Veronique Herrera, RN  Enhanced Safety Measures:   review medications for side effects with activity   room near unit station  Trust Relationship/Rapport:   care explained   choices provided   questions answered   questions encouraged

## 2025-04-11 NOTE — PLAN OF CARE
Goal Outcome Evaluation: No change       Plan of Care Reviewed With: patient    Overall Patient Progress: no changeOverall Patient Progress: no change    Outcome Evaluation: Pt alert and oriented x4. R/A. VSS. afebrile. Repositioned q 2 hours. Skin intact. Ate 100% of meals. Appetite minimal.  came to visit. No concerns. PRN Tylenol given for generalized pain x1. Pt oliguric. MD aware. 200 cc in the conainer. Purewick intact. Pt did have a BM this am.passing gas. No further concerns at this time. will continue to monitor alonzo.

## 2025-04-12 ENCOUNTER — APPOINTMENT (OUTPATIENT)
Dept: PHYSICAL THERAPY | Facility: CLINIC | Age: 70
End: 2025-04-12
Attending: INTERNAL MEDICINE
Payer: MEDICARE

## 2025-04-12 LAB
ANION GAP SERPL CALCULATED.3IONS-SCNC: 11 MMOL/L (ref 7–15)
BUN SERPL-MCNC: 9 MG/DL (ref 8–23)
CALCIUM SERPL-MCNC: 9.6 MG/DL (ref 8.8–10.4)
CHLORIDE SERPL-SCNC: 109 MMOL/L (ref 98–107)
CREAT SERPL-MCNC: 0.72 MG/DL (ref 0.51–0.95)
EGFRCR SERPLBLD CKD-EPI 2021: 90 ML/MIN/1.73M2
ERYTHROCYTE [DISTWIDTH] IN BLOOD BY AUTOMATED COUNT: 14.2 % (ref 10–15)
GLUCOSE SERPL-MCNC: 93 MG/DL (ref 70–99)
HCO3 SERPL-SCNC: 19 MMOL/L (ref 22–29)
HCT VFR BLD AUTO: 30.4 % (ref 35–47)
HGB BLD-MCNC: 9.7 G/DL (ref 11.7–15.7)
MCH RBC QN AUTO: 28.8 PG (ref 26.5–33)
MCHC RBC AUTO-ENTMCNC: 31.9 G/DL (ref 31.5–36.5)
MCV RBC AUTO: 90 FL (ref 78–100)
PLATELET # BLD AUTO: 204 10E3/UL (ref 150–450)
POTASSIUM SERPL-SCNC: 4.2 MMOL/L (ref 3.4–5.3)
RBC # BLD AUTO: 3.37 10E6/UL (ref 3.8–5.2)
SODIUM SERPL-SCNC: 139 MMOL/L (ref 135–145)
WBC # BLD AUTO: 3.9 10E3/UL (ref 4–11)

## 2025-04-12 PROCEDURE — 97530 THERAPEUTIC ACTIVITIES: CPT | Mod: GP

## 2025-04-12 PROCEDURE — 97161 PT EVAL LOW COMPLEX 20 MIN: CPT | Mod: GP

## 2025-04-12 PROCEDURE — 250N000013 HC RX MED GY IP 250 OP 250 PS 637: Performed by: INTERNAL MEDICINE

## 2025-04-12 PROCEDURE — 85027 COMPLETE CBC AUTOMATED: CPT

## 2025-04-12 PROCEDURE — 250N000011 HC RX IP 250 OP 636

## 2025-04-12 PROCEDURE — 120N000002 HC R&B MED SURG/OB UMMC

## 2025-04-12 PROCEDURE — 80048 BASIC METABOLIC PNL TOTAL CA: CPT

## 2025-04-12 PROCEDURE — 250N000013 HC RX MED GY IP 250 OP 250 PS 637

## 2025-04-12 PROCEDURE — 99232 SBSQ HOSP IP/OBS MODERATE 35: CPT | Mod: GC | Performed by: INTERNAL MEDICINE

## 2025-04-12 PROCEDURE — 36415 COLL VENOUS BLD VENIPUNCTURE: CPT

## 2025-04-12 RX ORDER — CIPROFLOXACIN 500 MG/1
500 TABLET, FILM COATED ORAL EVERY 12 HOURS
Qty: 4 TABLET | Refills: 0 | Status: SHIPPED | OUTPATIENT
Start: 2025-04-12 | End: 2025-04-14

## 2025-04-12 RX ADMIN — ACETAMINOPHEN 975 MG: 325 TABLET, FILM COATED ORAL at 22:09

## 2025-04-12 RX ADMIN — DULOXETINE 80 MG: 60 CAPSULE, DELAYED RELEASE ORAL at 09:59

## 2025-04-12 RX ADMIN — ACYCLOVIR 400 MG: 400 TABLET ORAL at 09:59

## 2025-04-12 RX ADMIN — HEPARIN SODIUM 5000 UNITS: 5000 INJECTION, SOLUTION INTRAVENOUS; SUBCUTANEOUS at 06:50

## 2025-04-12 RX ADMIN — LATANOPROST 1 DROP: 50 SOLUTION OPHTHALMIC at 22:05

## 2025-04-12 RX ADMIN — AMLODIPINE BESYLATE 5 MG: 5 TABLET ORAL at 10:00

## 2025-04-12 RX ADMIN — CIPROFLOXACIN 500 MG: 500 TABLET ORAL at 09:59

## 2025-04-12 RX ADMIN — CALCIUM CARBONATE (ANTACID) CHEW TAB 500 MG 500 MG: 500 CHEW TAB at 10:00

## 2025-04-12 RX ADMIN — HEPARIN SODIUM 5000 UNITS: 5000 INJECTION, SOLUTION INTRAVENOUS; SUBCUTANEOUS at 14:22

## 2025-04-12 RX ADMIN — BACLOFEN 20 MG: 10 TABLET ORAL at 09:56

## 2025-04-12 RX ADMIN — ACYCLOVIR 400 MG: 400 TABLET ORAL at 19:59

## 2025-04-12 RX ADMIN — BACLOFEN 30 MG: 10 TABLET ORAL at 22:04

## 2025-04-12 RX ADMIN — ACETAMINOPHEN 975 MG: 325 TABLET, FILM COATED ORAL at 06:50

## 2025-04-12 RX ADMIN — LOSARTAN POTASSIUM 50 MG: 50 TABLET, FILM COATED ORAL at 10:00

## 2025-04-12 RX ADMIN — FAMOTIDINE 20 MG: 20 TABLET, FILM COATED ORAL at 22:05

## 2025-04-12 RX ADMIN — LEVOTHYROXINE SODIUM 112 MCG: 0.11 TABLET ORAL at 06:50

## 2025-04-12 RX ADMIN — LOSARTAN POTASSIUM 50 MG: 50 TABLET, FILM COATED ORAL at 19:59

## 2025-04-12 RX ADMIN — CIPROFLOXACIN 500 MG: 500 TABLET ORAL at 19:59

## 2025-04-12 RX ADMIN — HEPARIN SODIUM 5000 UNITS: 5000 INJECTION, SOLUTION INTRAVENOUS; SUBCUTANEOUS at 22:04

## 2025-04-12 ASSESSMENT — ACTIVITIES OF DAILY LIVING (ADL)
ADLS_ACUITY_SCORE: 85
ADLS_ACUITY_SCORE: 83
ADLS_ACUITY_SCORE: 83
ADLS_ACUITY_SCORE: 85
ADLS_ACUITY_SCORE: 83
ADLS_ACUITY_SCORE: 85
ADLS_ACUITY_SCORE: 83
ADLS_ACUITY_SCORE: 85
ADLS_ACUITY_SCORE: 83
ADLS_ACUITY_SCORE: 83
ADLS_ACUITY_SCORE: 85
ADLS_ACUITY_SCORE: 83
ADLS_ACUITY_SCORE: 85
ADLS_ACUITY_SCORE: 85
ADLS_ACUITY_SCORE: 83
ADLS_ACUITY_SCORE: 85
ADLS_ACUITY_SCORE: 83

## 2025-04-12 NOTE — PLAN OF CARE
Goal Outcome Evaluation:      Plan of Care Reviewed With: patient, spouse    Overall Patient Progress: no changeOverall Patient Progress: no change  Pt. alert & Ox4. VS stable on RA.  Right PIV with NS at TKO for abx. Pt. turned q 2 hours, up with lift and 2 assist. Voiding via purewick, stool smear x1 this shift.  Stool needs to be sent to r/o C-Diff.  Call light in reach.

## 2025-04-12 NOTE — PLAN OF CARE
Goal Outcome Evaluation:      Plan of Care Reviewed With: patient    Overall Patient Progress: no changeOverall Patient Progress: no change     Temp: 98.2  F (36.8  C) Temp src: Oral BP: 138/62 Pulse: 104   Resp: 17 SpO2: 96 % O2 Device: None (Room air)      Neuro: Alert and Oriented  x4, let needs known  Cardiac: WNL, denies cardiac chest pain, lowers non edematous  Respiratory: LS clear and dim on room air   GI/: Voiding AUOP; known oliguria but adequate UOP overnight; last BM 4-11  Diet/Appetite: regular, denies nausea  Skin: blanchable redness to coccyx with mepilex in place;   LDA: L PIV running TKO  Activity: NOOB this shift  Pain: gave PRN tylenol x1; scheduled baclofen and repositioning was most helpful  Plan: continue plan of care

## 2025-04-12 NOTE — PROGRESS NOTES
04/12/25 1100   Appointment Info   Signing Clinician's Name / Credentials (PT) RASHEL Farias   Student Supervision Direct Patient Contact Provided;Therapy services provided with the co-signing licensed therapist guiding and directing the services, and providing the skilled judgement and assessment throughout the session   Rehab Comments (PT) WC bound, needs B AFOs and shoes to trial standing/slide board   Living Environment   People in Home spouse;child(ally), adult   Current Living Arrangements house   Home Accessibility wheelchair accessible   Transportation Anticipated family or friend will provide   Self-Care   Usual Activity Tolerance fair   Current Activity Tolerance poor   Regular Exercise No   Equipment Currently Used at Home grab bar, toilet;wheelchair, power;transfer board   Activity/Exercise/Self-Care Comment Pt reports needing assistance for ADLs, bed mobility, transfers - typically from . Typically attends OP PT 1x/week. Used slide board for transfers and trialed standing at TCU previously, has not done either since admission to [facility].   General Information   Onset of Illness/Injury or Date of Surgery 04/11/25   Referring Physician Preethi Swain MD   Patient/Family Therapy Goals Statement (PT) none specifically stated   Pertinent History of Current Problem (include personal factors and/or comorbidities that impact the POC) Marylee Woods is a 69 year old female admitted on 4/8/2025. She has past medical history of multiple sclerosis (ambulates with wheelchair), malignant neoplasm of left breast s/p lumpectomy in 2022, MARYLU, HTN, anemia of chronic disease, and depression who presents for evaluation of altered mental status.   Existing Precautions/Restrictions fall   Cognition   Affect/Mental Status (Cognition) WFL   Orientation Status (Cognition) oriented x 4   Follows Commands (Cognition) WFL;follows one-step commands;over 90% accuracy   Pain Assessment   Patient Currently in Pain No    Posture    Posture Forward head position;Protracted shoulders   Range of Motion (ROM)   Range of Motion ROM deficits secondary to weakness   ROM Comment AROM in BLE limited d/t weakness and deficits in muscle activation   Strength (Manual Muscle Testing)   Strength Comments Grossly >3/5 BUE strength and <3/5 BLE strength   Bed Mobility   Comment, (Bed Mobility) Supine>sit with ModAx1   Transfers   Comment, (Transfers) impaired per clinical reasoning   Balance   Balance Comments Sitting balance with CGA and BUE support after assisted into position   Clinical Impression   Criteria for Skilled Therapeutic Intervention Yes, treatment indicated   PT Diagnosis (PT) Impaired functional mobility   Influenced by the following impairments strength, balance, activity tolerance, spasticity, pain, dizziness   Functional limitations due to impairments bed mobility, transfers, standing tolerance, functional endurance, WC mobility   Clinical Presentation (PT Evaluation Complexity) stable   Clinical Presentation Rationale per clinical reasoning   Clinical Decision Making (Complexity) low complexity   Planned Therapy Interventions (PT) balance training;bed mobility training;home exercise program;neuromuscular re-education;patient/family education;postural re-education;ROM (range of motion);strengthening;stretching;transfer training;progressive activity/exercise;risk factor education;home program guidelines;wheelchair management/propulsion training   Risk & Benefits of therapy have been explained evaluation/treatment results reviewed;care plan/treatment goals reviewed;risks/benefits reviewed;patient   PT Total Evaluation Time   PT Eval, Low Complexity Minutes (46286) 8   Physical Therapy Goals   PT Frequency 5x/week   PT Predicted Duration/Target Date for Goal Attainment 05/03/25   PT Goals Bed Mobility;Transfers;Wheelchair Mobility   PT: Bed Mobility Minimal assist;Supine to/from sit   PT: Transfers Supervision/stand-by assist;Bed  to/from chair   PT: Wheelchair Mobility power wheelchair;Greater than 500 feet   PT Discharge Planning   PT Plan Bed mobility; sitting balance; slideboard transfer to    PT Discharge Recommendation (DC Rec) Transitional Care Facility   PT Rationale for DC Rec Pt below baseling and requiring assist with all mobility. Impaired activity tolerance, LE strength, and balance. Pt has not done any sliding board transfers or attempted standing with support since previous TCU stay. Recommend TCU at d/c for appropriate support and safety while improving functional mobility.   PT Brief overview of current status Ax1 with bed mobility, sitting EOB   PT Total Distance Amb During Session (feet) 0   Physical Therapy Time and Intention   Timed Code Treatment Minutes 24   Total Session Time (sum of timed and untimed services) 32

## 2025-04-12 NOTE — PLAN OF CARE
"Goal Outcome Evaluation: No change    BP (!) 142/67   Pulse 89   Temp 98  F (36.7  C) (Oral)   Resp 16   Wt 70.6 kg (155 lb 11.2 oz)   LMP 01/31/2004 (Approximate)   SpO2 100%   BMI 23.67 kg/m         Plan of Care Reviewed With: patient    Overall Patient Progress: no changeOverall Patient Progress: no change    Outcome Evaluation: Pt is alert and oriented x4. R/A. VSS. afebrile. Repositioned q 2 hours. Skin intact. Ate 100% or meals. Refused chocolate ensure, she states, \"this will give me diarrhea\". Pt had a x-large loose stool this am. New order placed for C-Diff check. Need stool specimen sent. Denies pain or nausea. No further concerns at this time. will continue to monitor closely.      "

## 2025-04-12 NOTE — PROGRESS NOTES
[facility]    Progress Note - Medicine Service, KENJI TEAM 5       Date of Admission:  4/8/2025    Today  - Cont Ciprofloxacin for 3d course  - Cont PTA Baclofen at 20/30mg  - Discontinue Gabapentin  - PT recommends TCU   - Ordered SW referral to assist in placement  - C diff testing for diarrhea    Assessment & Plan   Marylee Woods is a 69 year old female admitted on 4/8/2025. She has past medical history of multiple sclerosis (ambulates with wheelchair), malignant neoplasm of left breast s/p lumpectomy in 2022, MARYLU, HTN, anemia of chronic disease, and depression who presented for evaluation of altered mental status    Acute Encephalopathy, Toxic Metabolic w/ Medication vs Infection  Patient presented with intermittent episodes of decreased mentation over the last few months lasting hours to a day, but more recently lasting multiple days. Elevated Cystatin C with estimated GFR of 32, but also increased Cr 1.03 from baseline of 7.0. Question of whether there is medication component given note that at least one morning she took morning meds without issues but later in afternoon became more somnolent as well as consideration for recurrence of UTI as possible contributor. Significantly improved in the following days upon ceasing armodafinil, gabapentin, and baclofen, and starting pip/tazo. Urine cultures showing MDRO with enterobacter (pip/tazo resistant), enterococcus, and staph haemolyticus. Discussed with ID, and it was felt that infectious etiology less likely but not unreasonable to treat with cipro x3d for urinary clearance, low concern for CNS infection. Discussed with neuro, low suspicion for lupus flare, negative seizure workup with EEG. Discussed with PM&R, patient has been stable on baclofen for years, less likely causative. Discussed with patient, ultimately will treat for UTI and hold off on restarting armodafinil and gabapentin for now.    Other etiologies  considered: Patient with mildly elevated TSH but with normal T4, currently on levothyroxine. Other metabolic etiologies felt less likely given current levels of electrolytes, BUN, glucose, no current respiratory concerns concerning for hypercarbia or hypoxia, normal LFTs and Ammonia reassuring against hepatic encephalopathy. CT head currently without concern for intracranial structural abnormality or hemorrhage. No leukocytosis or otherwise localizing symptoms concerning for other infection at this time.     - ID Consult   - Lower concern for UTI at current time given lack of urinary symptoms currently so low suspicion for UTI to be cause of her clinical status    - If patient develops urinary symptoms if high ongoing concern for UTI, then would agree with AST team that 3-day course PO cipro would be reasonable if that were the case   - Consulted PM&R for assistance in managing spasticity, question of risk-benefit of considering other spasticity agent, if there is component of baclofen in the encephalopathy.               - Patient stable on baclofen for many years, recommend return to PTA regimen   - Agree with neuro that with frequent AKIs may benefit from decreased gabapentin when re-initiated, consider 300-600mg  - Ensure pt has follow up with neurologist that manages her MS  - PT and OT  - Spouse bring patient AFOs in for future therapy  - Consulted Neurology              - Low clinical suspicion for CNS infection or new demyelinating lesions, comfortable deferring MRI.  - Given frequent DENNISE's, suggest reducing gabapentin to 300-600mg when reinitiated (PTA 900mg)    DENNISE, Improved  Presented with Cr up to 1.03 with prior baseline down to 0.6-0.7 in 3/2025 the prior month. Suspect prerenal secondary to poor oral intake given evidence of hyaline casts and improvement of Cr with increased oral intake.   - Encourage PO intake    Diarrhea  Patient noted to have increased volume of stools in the setting of antibiotic  usage  - Ordered C diff testing    Coccyx pressure wound  Per nursing note on 4/9, was found to have wound on coccyx, likely pressure sore.   -Continue low air loss mattress for pressure sore prevention  -Consulted WOC     HSV Ppx  - Cont PTA Acyclovir     Hypertension  - Continued PTA amlodipine and losartan     Depression  - Continued PTA Cymbalta     History of malignant neoplasm left breast status postlumpectomy  - Follows with Dr. 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   - Continued PTA levothyroxine      Glaucoma   - Continued PTA latanoprost         Diet: Snacks/Supplements Adult: Ensure Enlive; Between Meals  Combination Diet Regular Diet Adult    DVT Prophylaxis: Heparin SQ  Verduzco Catheter: Not present  Fluids: none  Lines: None     Cardiac Monitoring: None  Code Status: Full Code      Clinically Significant Risk Factors          # Hyperchloremia: Highest Cl = 109 mmol/L in last 2 days, will monitor as appropriate              # Hypertension: Noted on problem list                # Financial/Environmental Concerns:           Social Drivers of Health   Tobacco Use: Medium Risk (3/21/2025)    Patient History     Smoking Tobacco Use: Former     Smokeless Tobacco Use: Never     Passive Exposure: Current   Physical Activity: Inactive (4/2/2024)    Exercise Vital Sign     Days of Exercise per Week: 0 days     Minutes of Exercise per Session: 20 min   Social Connections: Unknown (4/2/2024)    Social Connection and Isolation Panel [NHANES]     Frequency of Social Gatherings with Friends and Family: Once a week         Disposition Plan     Medically Ready for Discharge: Anticipated in 2-4 Days    The patient's care was discussed with the Attending Physician, Dr. Swain and Chief Resident/Fellow.    Moiz De Los Santos MD  Internal Medicine PGY3  Medicine Service, [facility]  TEAM 5  [facility]  Securely message with [employer] (more info)  Text page via [facility] Paging/Directory   See signed in provider for up to date coverage information  ______________________________________________________________________    Interval History   NAEO. Patient reports that she feels better compared to when she first came in. Discussed the workup thus far, after discussion patient would be amenable to continuing PTA robaxin, tx for UTI, stopping gabapentin, and deferring MRI.    Physical Exam   Vital Signs: Temp: 98.3  F (36.8  C) Temp src: Oral BP: (!) 144/77 (rn notified) Pulse: 89   Resp: 20 SpO2: 97 % O2 Device: None (Room air)    Weight: 155 lbs 11.2 oz    Gen: Awake and alert, NAD.  HEENT: Normocephalic, sclera non-icteric.   Respiratory: Normal respiratory effort.  Cardiac: RRR  Neuro/MSK: Patient is alert and oriented to self, time, and location. Able to track finger with eyes. Light touch sensation intact on face, upper, and lower extremities bilaterally. Face is symmetric with normal eye closure, smile, and puffing of cheeks. Shoulder shrug intact. 5/5 strength in upper extremities. 2/5 strength in hip and knee flexion bilaterally. L leg 2/5 strength for ankle flexion, R ankle flexion 3/5. Ambulates with wheelchair at baseline.    Medical Decision Making       Please see A&P for additional details of medical decision making.      Data   ------------------------- PAST 24 HR DATA REVIEWED -----------------------------------------------    I have personally reviewed the following data over the past 24 hrs:    3.9 (L)  \   9.7 (L)   / 204     139 109 (H) 9.0 /  93   4.2 19 (L) 0.72 \       Imaging results reviewed over the past 24 hrs:   No results found for this or any previous visit (from the past 24 hours).

## 2025-04-13 ENCOUNTER — APPOINTMENT (OUTPATIENT)
Dept: OCCUPATIONAL THERAPY | Facility: CLINIC | Age: 70
End: 2025-04-13
Attending: INTERNAL MEDICINE
Payer: MEDICARE

## 2025-04-13 LAB
BACTERIA BLD CULT: NO GROWTH
BACTERIA BLD CULT: NO GROWTH
C DIFF TOX B STL QL: NEGATIVE

## 2025-04-13 PROCEDURE — 99232 SBSQ HOSP IP/OBS MODERATE 35: CPT | Performed by: INTERNAL MEDICINE

## 2025-04-13 PROCEDURE — 250N000011 HC RX IP 250 OP 636

## 2025-04-13 PROCEDURE — 250N000013 HC RX MED GY IP 250 OP 250 PS 637: Performed by: INTERNAL MEDICINE

## 2025-04-13 PROCEDURE — 87493 C DIFF AMPLIFIED PROBE: CPT | Performed by: INTERNAL MEDICINE

## 2025-04-13 PROCEDURE — 120N000002 HC R&B MED SURG/OB UMMC

## 2025-04-13 PROCEDURE — 250N000013 HC RX MED GY IP 250 OP 250 PS 637

## 2025-04-13 PROCEDURE — 97166 OT EVAL MOD COMPLEX 45 MIN: CPT | Mod: GO

## 2025-04-13 PROCEDURE — 97535 SELF CARE MNGMENT TRAINING: CPT | Mod: GO

## 2025-04-13 RX ADMIN — BACLOFEN 30 MG: 10 TABLET ORAL at 20:25

## 2025-04-13 RX ADMIN — LOSARTAN POTASSIUM 50 MG: 50 TABLET, FILM COATED ORAL at 20:25

## 2025-04-13 RX ADMIN — ACETAMINOPHEN 975 MG: 325 TABLET, FILM COATED ORAL at 21:39

## 2025-04-13 RX ADMIN — HEPARIN SODIUM 5000 UNITS: 5000 INJECTION, SOLUTION INTRAVENOUS; SUBCUTANEOUS at 06:02

## 2025-04-13 RX ADMIN — AMLODIPINE BESYLATE 5 MG: 5 TABLET ORAL at 08:18

## 2025-04-13 RX ADMIN — CIPROFLOXACIN 500 MG: 500 TABLET ORAL at 20:25

## 2025-04-13 RX ADMIN — HEPARIN SODIUM 5000 UNITS: 5000 INJECTION, SOLUTION INTRAVENOUS; SUBCUTANEOUS at 14:25

## 2025-04-13 RX ADMIN — ACYCLOVIR 400 MG: 400 TABLET ORAL at 20:25

## 2025-04-13 RX ADMIN — DULOXETINE 80 MG: 60 CAPSULE, DELAYED RELEASE ORAL at 08:17

## 2025-04-13 RX ADMIN — LATANOPROST 1 DROP: 50 SOLUTION OPHTHALMIC at 21:39

## 2025-04-13 RX ADMIN — LOSARTAN POTASSIUM 50 MG: 50 TABLET, FILM COATED ORAL at 08:18

## 2025-04-13 RX ADMIN — CALCIUM CARBONATE (ANTACID) CHEW TAB 500 MG 500 MG: 500 CHEW TAB at 08:17

## 2025-04-13 RX ADMIN — CIPROFLOXACIN 500 MG: 500 TABLET ORAL at 08:17

## 2025-04-13 RX ADMIN — ACETAMINOPHEN 975 MG: 325 TABLET, FILM COATED ORAL at 15:42

## 2025-04-13 RX ADMIN — LEVOTHYROXINE SODIUM 112 MCG: 0.11 TABLET ORAL at 06:02

## 2025-04-13 RX ADMIN — ACYCLOVIR 400 MG: 400 TABLET ORAL at 08:17

## 2025-04-13 RX ADMIN — HEPARIN SODIUM 5000 UNITS: 5000 INJECTION, SOLUTION INTRAVENOUS; SUBCUTANEOUS at 21:39

## 2025-04-13 RX ADMIN — BACLOFEN 20 MG: 10 TABLET ORAL at 08:17

## 2025-04-13 RX ADMIN — FAMOTIDINE 20 MG: 20 TABLET, FILM COATED ORAL at 21:39

## 2025-04-13 ASSESSMENT — ACTIVITIES OF DAILY LIVING (ADL)
ADLS_ACUITY_SCORE: 85

## 2025-04-13 NOTE — PROGRESS NOTES
[facility]    Medicine Progress Note - Medicine Service, KENJI TEAM 5    Date of Admission:  4/8/2025    Assessment & Plan   Marylee Woods is a 69 year old female admitted on 4/8/2025. She has past medical history of multiple sclerosis (ambulates with wheelchair), malignant neoplasm of left breast s/p lumpectomy in 2022, MARYLU, HTN, anemia of chronic disease, and depression who presented for evaluation of altered mental status.     Updates today:  --Patient continues with improved mental status that appears to be at her baseline, no complaints for me this morning  --C. difficile PCR negative  --Patient completes 3-day course of ciprofloxacin for UTI tomorrow morning 4/14  --PT recommending TCU and patient and spouse agreeable, social work consult placed 4/12, appreciate assistance     Acute Encephalopathy, Toxic Metabolic w/ Medication vs Infection, now resolved  Patient presented with intermittent episodes of decreased mentation over the last few months lasting hours to a day, but more recently lasting multiple days.   --Strongly suspect medication effect with gabapentin in the setting of likely fluctuating renal clearance as encephalopathy resolved with cessation of this medication and she has been on fluctuating doses of this medication for at least the past month across 2 hospitalizations with waxing and waning mental status reported  -- Neurology consulted, lower suspicion for lupus flare, negative seizure workup with EEG, low suspicion for CNS infection and neurology recommending against MRI this admission.  --Urine cultures showing MDRO with enterobacter (pip/tazo resistant), enterococcus, and staph haemolyticus. Discussed with ID, and it was felt that infectious etiology less likely but not unreasonable to treat with cipro x3d for urinary clearance, low concern for CNS infection.  Patient will complete UTI treatment tomorrow morning 4/14  --Mildly elevated TSH but  with normal T4, currently on levothyroxine.  --CT head currently without concern for intracranial structural abnormality or hemorrhage.     Multiple sclerosis  History of spasticity secondary to MS  - Consulted PM&R for assistance in managing spasticity:              - Patient stable on baclofen for many years, returned to PTA regimen during this admission at that changes in mentation  - Ensure pt has follow up with neurologist that manages her MS  - PT and OT  - Spouse bring patient AFOs in for future therapy  - Patient follows with Dr. Redman at [facility] neurology [facility]       DENNISE, resolved  Elevated Cystatin C with estimated GFR of 32, creatinine fluctuating 0.60 to 1.22 since 3/9/2025. Suspect prerenal secondary to poor oral intake given evidence of hyaline casts and improvement of Cr with increased oral intake.      Diarrhea, resolving, likely secondary to antibiotics  Patient noted to have increased volume of stools in the setting of antibiotic usage.  C. difficile PCR negative 4/13.     Coccyx pressure wound  Per nursing note on 4/9, was found to have pressure wound on coccyx  -Continue low air loss mattress for pressure sore prevention  -Consulted WOC     HSV Ppx  - Cont PTA Acyclovir, patient is uncertain when she is taking this medication or who prescribes it, but is willing to discuss this with her PCP at next follow-up     Hypertension  - Continued PTA amlodipine and losartan     Depression  - Continued PTA Cymbalta     History of malignant neoplasm left breast status postlumpectomy  - Follows with Dr. 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   - Continued PTA levothyroxine      Glaucoma   - Continued PTA  latanoprost           Diet: Snacks/Supplements Adult: Ensure Enlive; Between Meals  Combination Diet Regular Diet Adult    DVT Prophylaxis: Heparin SQ  Verduzco Catheter: Not present  Lines: None     Cardiac Monitoring: None  Code Status: Full Code      Clinically Significant Risk Factors          # Hyperchloremia: Highest Cl = 109 mmol/L in last 2 days, will monitor as appropriate              # Hypertension: Noted on problem list                # Financial/Environmental Concerns:           Social Drivers of Health    Tobacco Use: Medium Risk (3/21/2025)    Patient History     Smoking Tobacco Use: Former     Smokeless Tobacco Use: Never     Passive Exposure: Current   Physical Activity: Inactive (4/2/2024)    Exercise Vital Sign     Days of Exercise per Week: 0 days     Minutes of Exercise per Session: 20 min   Social Connections: Unknown (4/2/2024)    Social Connection and Isolation Panel [NHANES]     Frequency of Social Gatherings with Friends and Family: Once a week          Disposition Plan     Medically Ready for Discharge: Ready Now             Preethi Swain MD  Medicine Service, [facility] TEAM [address] [place] [address]  Securely message with [employer] (more info)  Text page via [employer] Paging/Directory   See signed in provider for up to date coverage information  ______________________________________________________________________    Interval History   No acute events overnight.  Patient feeling reasonably well this morning and looking to order some breakfast.  Happy that she has a lab holiday this morning.  No report of pain or uncontrolled spasticity symptoms.    Physical Exam   Vital Signs: Temp: 98.7  F (37.1  C) Temp src: Oral BP: 132/72 Pulse: 97   Resp: 18 SpO2: 98 % O2 Device: None (Room air)    Weight: 155 lbs 11.2 oz    Gen: NAD, lying comfortably in bed, pleasant and cooperative with interview and exam  HEENT: normocephalic, no scleral icterus, no perioral lesions,  oral mucosa moist  CV: RRR at time of exam  Pulm: good air movement bilaterally without wheezing  Abd: soft, +bs, nontender to palpation diffusely, nondistended  LE: Stable 1+ edema bilaterally  Skin: no rash or jaundice on limited exam  Neuro: AOx3  Psych: mood-affect congruent      Medical Decision Making             Data   Last Comprehensive Metabolic Panel:  Lab Results   Component Value Date     04/12/2025    POTASSIUM 4.2 04/12/2025    CHLORIDE 109 (H) 04/12/2025    CO2 19 (L) 04/12/2025    ANIONGAP 11 04/12/2025    GLC 93 04/12/2025    BUN 9.0 04/12/2025    CR 0.72 04/12/2025    GFRESTIMATED 90 04/12/2025    JORDON 9.6 04/12/2025       CBC RESULTS:   Recent Labs   Lab Test 04/12/25  0917   WBC 3.9*   RBC 3.37*   HGB 9.7*   HCT 30.4*   MCV 90   MCH 28.8   MCHC 31.9   RDW 14.2

## 2025-04-13 NOTE — PROGRESS NOTES
04/13/25 1000   Appointment Info   Signing Clinician's Name / Credentials (OT) Taylor Trevino OTR/L   Rehab Comments (OT) WC bound, needs B AFOs and shoes to trial standing/slide board   Living Environment   People in Home spouse;child(ally), adult   Current Living Arrangements house   Home Accessibility wheelchair accessible   Transportation Anticipated family or friend will provide   Living Environment Comments Pt reports living in a one level home with ramp and all needs met on ML.   Self-Care   Usual Activity Tolerance fair   Current Activity Tolerance fair   Regular Exercise No   Equipment Currently Used at Home grab bar, toilet;wheelchair, power;transfer board   Fall history within last six months no   Activity/Exercise/Self-Care Comment Pt reports needing A from spouse for all ADLs, able to complete SB transfers with A from spouse into w/c. OP therapy 1x weekly. Was at a TCU prior to this stay and working on SB transfers.,   General Information   Onset of Illness/Injury or Date of Surgery 04/08/25   Referring Physician Preethi Swain MD   Patient/Family Therapy Goal Statement (OT) Return home.   Additional Occupational Profile Info/Pertinent History of Current Problem Marylee Woods is a 69 year old female admitted on 4/8/2025. She has past medical history of multiple sclerosis (ambulates with wheelchair), malignant neoplasm of left breast s/p lumpectomy in 2022, MARYLU, HTN, anemia of chronic disease, and depression who presents for evaluation of altered mental status.   Existing Precautions/Restrictions fall   Left Upper Extremity (Weight-bearing Status) full weight-bearing (FWB)   Right Upper Extremity (Weight-bearing Status) full weight-bearing (FWB)   Cognitive Status Examination   Orientation Status person;place;time   Affect/Mental Status (Cognitive) WFL   Follows Commands follows one-step commands;75-90% accuracy   Visual Perception   Visual Impairment/Limitations corrective lenses full-time    Sensory   Sensory Quick Adds sensation intact   Range of Motion Comprehensive   General Range of Motion bilateral upper extremity ROM WFL   Strength Comprehensive (MMT)   General Manual Muscle Testing (MMT) Assessment upper extremity strength deficits identified   Comment, General Manual Muscle Testing (MMT) Assessment Deconditioning   Coordination   Upper Extremity Coordination No deficits were identified   Coordination Comments WFL   Bed Mobility   Bed Mobility supine-sit;sit-supine   Comment (Bed Mobility) Mod A   Transfers   Transfer Comments power w/c not in room so unable to attempt SB transfers   Balance   Balance Assessment sitting dynamic balance   Balance Comments CGA   Activities of Daily Living   BADL Assessment/Intervention toileting;lower body dressing;bathing   Bathing Assessment/Intervention   [place] Level (Bathing) maximum assist (25% patient effort)   Lower Body Dressing Assessment/Training   [place] Level (Lower Body Dressing) dependent (less than 25% patient effort)   Toileting   [place] Level (Toileting) maximum assist (25% patient effort)   Clinical Impression   Criteria for Skilled Therapeutic Interventions Met (OT) Yes, treatment indicated   OT Diagnosis Decreased ADL function   OT Problem List-Impairments impacting ADL problems related to;activity tolerance impaired;balance;strength   Assessment of Occupational Performance 3-5 Performance Deficits   Planned Therapy Interventions (OT) ADL retraining;IADL retraining;transfer training;home program guidelines;progressive activity/exercise;ROM;strengthening   Clinical Decision Making Complexity (OT) detailed assessment/moderate complexity   Risk & Benefits of therapy have been explained evaluation/treatment results reviewed;care plan/treatment goals reviewed;risks/benefits reviewed;current/potential barriers reviewed;participants voiced agreement with care plan;participants included;patient   Clinical Impression Comments Pt  below baseline and will benefit from skilled OT throughout hospital stay to promote return to functional baseline and assist with d/c planning.   OT Total Evaluation Time   OT Eval, Moderate Complexity Minutes (64463) 10   OT Goals   Therapy Frequency (OT) 3 times/week   OT Predicted Duration/Target Date for Goal Attainment 04/30/25   OT Goals Upper Body Dressing;Upper Body Bathing;Bed Mobility;Transfers;Toilet Transfer/Toileting   OT: Upper Body Dressing Modified independent;using adaptive equipment   OT: Upper Body Bathing Modified independent;using adaptive equipment   OT: Bed Mobility Minimal assist   OT: Transfer Minimal assist;with assistive device   OT: Toilet Transfer/Toileting Minimal assist;toilet transfer;using adaptive equipment   OT Discharge Planning   OT Plan EOB ADLs, SB trnasfers with power w/c, dressing   OT Discharge Recommendation (DC Rec) Transitional Care Facility   OT Rationale for DC Rec Pt below baseline currenlty limited by weakness and confusion. TCu rec at this time to progress pt back to functional baseline. Pending LOS and ability to complete SB transfers may progress back home with 24/7 support from family. Will continue to assess.   OT Brief overview of current status mod A bed mobility.

## 2025-04-13 NOTE — PLAN OF CARE
Goal Outcome Evaluation: Progressing    Temp: 98.9  F (37.2  C) Temp src: Oral BP: (!) 146/79 Pulse: 80   Resp: 18 SpO2: 100 % O2 Device: None (Room air)        Plan of Care Reviewed With: patient    Overall Patient Progress: no changeOverall Patient Progress: no change    Outcome Evaluation: Pt alert and oriented x4. R/A. VSS. Afebrile. Repostioned q 2-3 hours per pt request. Ate 100% of meals. Pt had a large stool this am. Specimen sent to lab. C-Diff negative. Pt is no longer on contact precautions. Pt denies pain this shift. No prn Tyelenol given. Plan is to discharge to TCU upon placement. No further concerns at this time. will continue to monitor closely.

## 2025-04-13 NOTE — PLAN OF CARE
Goal Outcome Evaluation:      Plan of Care Reviewed With: patient    Overall Patient Progress: no change    Outcome Evaluation: Assumed cares: [id number]  Vitals: VSS on RA  Neuros: A&Ox4, able to make needs known  Cardiac: WNL, denies chest pain  IV: PIV TKO  Labs/Electrolytes: Review AM labs  Resp: WNL, denies SOB  Diet: Regular   : Purewick in place, adequate output  GI: No BM during shift  Skin: No new concerns noted  Pain: headache managed with PRN tylenol  Activity: Ax2, Lift.  Plan: Still need stool sample, continue to follow POC, notify provider of any changes

## 2025-04-13 NOTE — PLAN OF CARE
Goal Outcome Evaluation:      Plan of Care Reviewed With: patient    Overall Patient Progress: no changeOverall Patient Progress: no change     Time: 8761-6125    Vitals: B/P: 146/79, T: 98.7, P: 92, R: 18   Neuro: A&Ox4, makes needs known  Cardiac: Denies chest pain, WDL  Resp: Denies SOB, sating >92% on RA  GI: last BM this am, passing gas  : purewick in place - AUOP  Pain: reports achyness in legs, PRN tylenol given x1  N/V: Denies  Skin: coccyx wound - mepilex in place  Lines: PIV - SL  Drains: purewick  Labs: Reviewed  Diet: Regular, tolerating well  Activity: Ax2 w/lift, turn Q2    Plan: Continue with POC.      Nati Allen RN

## 2025-04-14 ENCOUNTER — APPOINTMENT (OUTPATIENT)
Dept: PHYSICAL THERAPY | Facility: CLINIC | Age: 70
DRG: 092 | End: 2025-04-14
Payer: MEDICARE

## 2025-04-14 ENCOUNTER — TELEPHONE (OUTPATIENT)
Dept: ENDOCRINOLOGY | Facility: CLINIC | Age: 70
End: 2025-04-14
Payer: MEDICARE

## 2025-04-14 ENCOUNTER — MYC MEDICAL ADVICE (OUTPATIENT)
Dept: ENDOCRINOLOGY | Facility: CLINIC | Age: 70
End: 2025-04-14
Payer: MEDICARE

## 2025-04-14 LAB
ANION GAP SERPL CALCULATED.3IONS-SCNC: 11 MMOL/L (ref 7–15)
BUN SERPL-MCNC: 8.4 MG/DL (ref 8–23)
CALCIUM SERPL-MCNC: 9.4 MG/DL (ref 8.8–10.4)
CHLORIDE SERPL-SCNC: 109 MMOL/L (ref 98–107)
CREAT SERPL-MCNC: 0.74 MG/DL (ref 0.51–0.95)
EGFRCR SERPLBLD CKD-EPI 2021: 87 ML/MIN/1.73M2
ERYTHROCYTE [DISTWIDTH] IN BLOOD BY AUTOMATED COUNT: 14.3 % (ref 10–15)
GLUCOSE SERPL-MCNC: 104 MG/DL (ref 70–99)
HCO3 SERPL-SCNC: 20 MMOL/L (ref 22–29)
HCT VFR BLD AUTO: 28.6 % (ref 35–47)
HGB BLD-MCNC: 9.3 G/DL (ref 11.7–15.7)
MCH RBC QN AUTO: 28.5 PG (ref 26.5–33)
MCHC RBC AUTO-ENTMCNC: 32.5 G/DL (ref 31.5–36.5)
MCV RBC AUTO: 88 FL (ref 78–100)
PLATELET # BLD AUTO: 203 10E3/UL (ref 150–450)
PLATELET # BLD AUTO: 223 10E3/UL (ref 150–450)
POTASSIUM SERPL-SCNC: 3.5 MMOL/L (ref 3.4–5.3)
RBC # BLD AUTO: 3.26 10E6/UL (ref 3.8–5.2)
SODIUM SERPL-SCNC: 140 MMOL/L (ref 135–145)
WBC # BLD AUTO: 2.8 10E3/UL (ref 4–11)

## 2025-04-14 PROCEDURE — 250N000011 HC RX IP 250 OP 636

## 2025-04-14 PROCEDURE — 250N000013 HC RX MED GY IP 250 OP 250 PS 637: Performed by: INTERNAL MEDICINE

## 2025-04-14 PROCEDURE — 85049 AUTOMATED PLATELET COUNT: CPT

## 2025-04-14 PROCEDURE — 99232 SBSQ HOSP IP/OBS MODERATE 35: CPT | Mod: GC | Performed by: INTERNAL MEDICINE

## 2025-04-14 PROCEDURE — 120N000002 HC R&B MED SURG/OB UMMC

## 2025-04-14 PROCEDURE — 85049 AUTOMATED PLATELET COUNT: CPT | Performed by: INTERNAL MEDICINE

## 2025-04-14 PROCEDURE — 36415 COLL VENOUS BLD VENIPUNCTURE: CPT | Performed by: INTERNAL MEDICINE

## 2025-04-14 PROCEDURE — 250N000013 HC RX MED GY IP 250 OP 250 PS 637

## 2025-04-14 PROCEDURE — 97530 THERAPEUTIC ACTIVITIES: CPT | Mod: GP | Performed by: PHYSICAL THERAPIST

## 2025-04-14 PROCEDURE — 80048 BASIC METABOLIC PNL TOTAL CA: CPT | Performed by: INTERNAL MEDICINE

## 2025-04-14 PROCEDURE — 36415 COLL VENOUS BLD VENIPUNCTURE: CPT

## 2025-04-14 RX ADMIN — HEPARIN SODIUM 5000 UNITS: 5000 INJECTION, SOLUTION INTRAVENOUS; SUBCUTANEOUS at 21:04

## 2025-04-14 RX ADMIN — FAMOTIDINE 20 MG: 20 TABLET, FILM COATED ORAL at 21:04

## 2025-04-14 RX ADMIN — BACLOFEN 20 MG: 10 TABLET ORAL at 09:40

## 2025-04-14 RX ADMIN — ACYCLOVIR 400 MG: 400 TABLET ORAL at 19:41

## 2025-04-14 RX ADMIN — LEVOTHYROXINE SODIUM 112 MCG: 0.11 TABLET ORAL at 05:37

## 2025-04-14 RX ADMIN — BACLOFEN 30 MG: 10 TABLET ORAL at 19:41

## 2025-04-14 RX ADMIN — AMLODIPINE BESYLATE 5 MG: 5 TABLET ORAL at 09:40

## 2025-04-14 RX ADMIN — LATANOPROST 1 DROP: 50 SOLUTION OPHTHALMIC at 21:04

## 2025-04-14 RX ADMIN — ACYCLOVIR 400 MG: 400 TABLET ORAL at 09:41

## 2025-04-14 RX ADMIN — CALCIUM CARBONATE (ANTACID) CHEW TAB 500 MG 500 MG: 500 CHEW TAB at 15:02

## 2025-04-14 RX ADMIN — ACETAMINOPHEN 975 MG: 325 TABLET, FILM COATED ORAL at 15:02

## 2025-04-14 RX ADMIN — HEPARIN SODIUM 5000 UNITS: 5000 INJECTION, SOLUTION INTRAVENOUS; SUBCUTANEOUS at 05:37

## 2025-04-14 RX ADMIN — ACETAMINOPHEN 975 MG: 325 TABLET, FILM COATED ORAL at 02:52

## 2025-04-14 RX ADMIN — DULOXETINE 80 MG: 60 CAPSULE, DELAYED RELEASE ORAL at 09:40

## 2025-04-14 RX ADMIN — LOSARTAN POTASSIUM 50 MG: 50 TABLET, FILM COATED ORAL at 19:42

## 2025-04-14 RX ADMIN — CIPROFLOXACIN 500 MG: 500 TABLET ORAL at 09:40

## 2025-04-14 RX ADMIN — HEPARIN SODIUM 5000 UNITS: 5000 INJECTION, SOLUTION INTRAVENOUS; SUBCUTANEOUS at 15:01

## 2025-04-14 RX ADMIN — ACETAMINOPHEN 975 MG: 325 TABLET, FILM COATED ORAL at 09:56

## 2025-04-14 RX ADMIN — LOSARTAN POTASSIUM 50 MG: 50 TABLET, FILM COATED ORAL at 09:41

## 2025-04-14 ASSESSMENT — ACTIVITIES OF DAILY LIVING (ADL)
ADLS_ACUITY_SCORE: 85
ADLS_ACUITY_SCORE: 93
ADLS_ACUITY_SCORE: 85
ADLS_ACUITY_SCORE: 89
ADLS_ACUITY_SCORE: 85
ADLS_ACUITY_SCORE: 93
ADLS_ACUITY_SCORE: 93
ADLS_ACUITY_SCORE: 85
ADLS_ACUITY_SCORE: 89
ADLS_ACUITY_SCORE: 89
ADLS_ACUITY_SCORE: 93
ADLS_ACUITY_SCORE: 93
ADLS_ACUITY_SCORE: 85
ADLS_ACUITY_SCORE: 93
ADLS_ACUITY_SCORE: 89
ADLS_ACUITY_SCORE: 89
ADLS_ACUITY_SCORE: 85
ADLS_ACUITY_SCORE: 85

## 2025-04-14 NOTE — TELEPHONE ENCOUNTER
R/S needed but pt is also hospitalized. Estimated discharge date is today, but the date has been pushed out several times. If pt is available for a PM appt tomorrow please r/s per Dr. Flowers's instructions below. If pt will still be hospitalized or can't make it to the clinic tomorrow, please reschedule to Dr. Flowers's next available SKYLER (currently 04/16).    I hope this message finds you well. Below is an important message from Dr. Flowers regarding a family matter, as you are both covering for Mi Huber:  I have an urgent appointment with my son next tuesday 4/15 and need to reschedule my clinic patients from 7am to 9;30am.  I can offer to see these patients at 11:30am  (please double book) 1:30pm (please double book) 2pm 2:30pm and 3PM. I apologize but this is a very urgent appointment I need to attend with my son.    Allison Mitchell on 4/14/2025 at 10:05 AM

## 2025-04-14 NOTE — PROGRESS NOTES
[facility]    Progress Note - Medicine Service, MARBHUMI TEAM 5       Date of Admission:  4/8/2025    Assessment & Plan   Marylee Woods is a 69 year old female admitted on 4/8/2025. She has past medical history of multiple sclerosis (ambulates with wheelchair), malignant neoplasm of left breast s/p lumpectomy in 2022, MARYLU, HTN, anemia of chronic disease, and depression who presents for evaluation of altered mental status.     Today  - Patient continues with improved mental status that appears to be at her baseline  - s/p 3d course of ciprofloxacin  - Medically ready for discharge to TCU    Acute Encephalopathy, Toxic Metabolic 2/2 Medication, resolved  Multiple Sclerosis  Patient presented with intermittent episodes of decreased mentation over the last few months lasting hours to a day, but more recently lasting multiple days.   --Strongly suspect medication effect with gabapentin in the setting of likely fluctuating renal clearance as encephalopathy resolved with cessation of this medication and she has been on fluctuating doses of this medication for at least the past month across 2 hospitalizations with waxing and waning mental status reported  -- Neurology consulted, lower suspicion for lupus flare, negative seizure workup with EEG, low suspicion for CNS infection and neurology recommending against MRI this admission.  --Urine cultures showing MDRO with enterobacter (pip/tazo resistant), enterococcus, and staph haemolyticus. Discussed with ID, and it was felt that infectious etiology less likely but not unreasonable to treat with cipro x3d for urinary clearance, low concern for CNS infection. Now s/p cipro course.  --Mildly elevated TSH but with normal T4, currently on levothyroxine.  --CT head currently without concern for intracranial structural abnormality or hemorrhage.    Right internal carotid artery saccular aneurysm  Found on CT of Head and neck on 4/8,  measures 3 x 3 mm.   -Follow-up with outpatient PCP for further evaluation    DENNISE, resolved  Cr has remained WNL from 4/9-4/14, WNL a 0.74 on 4/14, down from 1.03 on 4/8. Suspect prerenal secondary to poor oral intake given evidence of hyaline casts and improvement of Cr with increased oral intake.      Coccyx pressure wound  Per nursing note on 4/9, was found to have wound on coccyx, likely pressure sore.   -Continue low air loss mattress for pressure sore prevention  -Consulted WOC    Diarrhea, resolved  Patient has had episodes of diarrhea in the each morning since 4/11, though resolved on 4/14.  -C diff negative 4/13     HSV Ppx  - Cont PTA Acyclovir     Hypertension  - Continued PTA amlodipine and losartan     Depression  - Continued PTA Cymbalta     History of malignant neoplasm left breast status postlumpectomy  - Follows with Dr. 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   - Continued PTA levothyroxine      Glaucoma   - Continued PTA latanoprost        Diet: Snacks/Supplements Adult: Ensure Enlive; Between Meals  Combination Diet Regular Diet Adult  Diet    DVT Prophylaxis: Heparin SQ  Verduzco Catheter: Not present  Fluids: none  Lines: None     Cardiac Monitoring: None  Code Status: Full Code      Clinically Significant Risk Factors          # Hyperchloremia: Highest Cl = 109 mmol/L in last 2 days, will monitor as appropriate              # Hypertension: Noted on problem list                # Financial/Environmental Concerns:           Social Drivers of Health   Tobacco Use: Medium Risk (3/21/2025)    Patient History     Smoking Tobacco Use: Former     Smokeless Tobacco Use: Never     Passive Exposure: Current   Physical Activity: Inactive (4/2/2024)    Exercise Vital Sign     Days of Exercise per Week: 0 days     Minutes of Exercise per Session: 20 min   Social  Connections: Unknown (4/2/2024)    Social Connection and Isolation Panel [NHANES]     Frequency of Social Gatherings with Friends and Family: Once a week         Disposition Plan     Medically Ready for Discharge: Anticipated Tomorrow       The patient's care was discussed with the Attending Physician, Dr. Swain and Chief Resident/Fellow.    Clair Simmons  Medical Student  Medicine Service, [address]  Securely message with [employer] (more info)  Text page via [employer] Paging/Directory   See signed in provider for up to date coverage information    I was present with the medical student who participated in the service and in the documentation of the note. I have verified the history and personally performed the physical exam and medical decision making. I agree with the assessment and plan of care as documented in the note.    Moiz De Los Santos MD  Internal Medicine PGY3    ______________________________________________________________________    Interval History   NAEO. Today, she finished her 3 day course of ciprofloxacin. She continues to deny dysuria, urinary frequency, urinary urgency, and subjective fever/chills. She notes she has no longer been having loose stools in the morning.     Physical Exam   Vital Signs: Temp: 98.3  F (36.8  C) Temp src: Oral BP: (!) 148/73 Pulse: 95   Resp: 18 SpO2: 98 % O2 Device: None (Room air)    Weight: 155 lbs 11.2 oz    Gen: Alert and oriented, NAD  HEENT: Normocephalic, sclera non-icteric.   Respiratory: Normal respiratory effort.  Cardiac: RRR; extremities well perfused  Abdomen: Soft, non-distended; no TTP  Neuro/MSK: Patient is alert and oriented to self, time, and location. Able to track finger with eyes. Light touch sensation intact on face, upper, and lower extremities bilaterally. Face is symmetric with normal eye closure, smile, and puffing of cheeks. Shoulder shrug intact. 5/5 strength in upper extremities. 2/5  strength in hip and knee flexion bilaterally. L leg 2/5 strength for ankle flexion, R ankle flexion 3/5. Ambulates with wheelchair at baseline.       Medical Decision Making       Please see A&P for additional details of medical decision making.      Data   ------------------------- PAST 24 HR DATA REVIEWED -----------------------------------------------    I have personally reviewed the following data over the past 24 hrs:    2.8 (L)  \   9.3 (L)   / 203     140 109 (H) 8.4 /  104 (H)   3.5 20 (L) 0.74 \       Imaging results reviewed over the past 24 hrs:   No results found for this or any previous visit (from the past 24 hours).

## 2025-04-14 NOTE — PLAN OF CARE
Goal Outcome Evaluation:      Plan of Care Reviewed With: patient    Overall Patient Progress: no changeOverall Patient Progress: no change    Temp: 98.3  F (36.8  C) Temp src: Oral BP: 129/63 Pulse: 80   Resp: 18 SpO2: 98 % O2 Device: None (Room air)      Neuro: Alert and Oriented  x4, let needs known  Cardiac: WNL, denies cardiac chest pain, lowers non edematous  Respiratory: LS clear and dim on room air   GI/: Voiding AUOP; known oliguria but adequate UOP overnight; last BM 4-13  Diet/Appetite: regular, denies nausea  Skin: blanchable redness to coccyx with mepilex in place; applied cream for discomfort to bottom  LDA: L PIV SL  Activity: NOOB this shift  Pain: gave PRN tylenol x2; scheduled baclofen and repositioning was most helpful  Plan: continue plan of care

## 2025-04-14 NOTE — CONSULTS
Care Management Follow Up    Length of Stay (days): 6    Expected Discharge Date: 04/14/2025     Concerns to be Addressed:  none at this time.  Patient plan of care discussed at interdisciplinary rounds: Yes    Anticipated Discharge Disposition:  TCU     Anticipated Discharge Services:  n/a  Anticipated Discharge DME:  n/a    Patient/family educated on Medicare website which has current facility and service quality ratings:  yes  Education Provided on the Discharge Plan:  yes  Patient/Family in Agreement with the Plan:  yes    Referrals Placed by CM/SW:      Declined    [facility]  [address]  4th [place] [address]  P: [phone]   4/14: Referral placed. SW reached out to liaison for bed openings. Liaison confirmed that there are no current bed openings this week.     [facility]  [address], [place] [address]  P: [phone]  4/14: SW called and left a vm to inquire about referral and bed openings. SW received a call back stating that there are currently no beds available this week. Admissions reported to call back next week for a phone referral.     Pending:     [facility]   [address] [place] [address]  P: [phone]   Adm: [phone]  F: [phone]   4/14: SW placed initial referral.     [facility] ([facility] known [facility])  [address] [place]  [address]  P: [phone]  Admissions: [phone]  F: [phone]   4/14: SW placed initial referral.     [facility]  [address] [place]  [address]  P: [phone]  Admissions Jenelle/Radha: [phone]  F: [phone]    4/14: SW placed initial referral.     Private pay costs discussed: Not applicable    Discussed  Partnership in Safe Discharge Planning  document with patient/family: No     Handoff Completed: No, handoff not indicated or clinically appropriate    Additional Information:  See JUSTIN note from Santi  Erica at 12:34pm per previous conversation.     SW received an update from [facility] regarding [facility] stating that there are currently no beds available this week. SW met with pt at bedside to provide an update on the two referrals that were placed. Pt stated that the list that was provided was mostly TCUs in [place] and would prefer options in [place]. Pt declined another list and the SW and pt agreed that the SW would reach out to pt's  for other options.    SW then reached out to pt's spouse by phone about the TCU updates and other options. Pt's spouse provided 3 other options and confirmed to start there. Pt's spouse confirmed to be at the hospital later on tonight and tomorrow. SW confirmed to stop by the room tomorrow for updates on the TCUs.     Next Steps: SW to continue to follow up on pending TCU referrals for bed availability.     LAKHWINDER Mazariegos   SCOTT  [facility]  Available via Vocera  [address] JUSTIN  Ph: [phone]

## 2025-04-14 NOTE — DISCHARGE SUMMARY
[facility]  Hospitalist Discharge Summary      Date of Admission:  4/8/2025  Date of Discharge:  4/16/2025  Discharging Provider: Moiz De Los Santos MD  Discharge Service: Medicine Service, KENJI TEAM 5    Discharge Diagnoses   Acute Encephalopathy, Toxic Metabolic due to Medication, now resolved  DENNISE, resolved  Multiple sclerosis  History of spasticity secondary to MS  Diarrhea, resolved, likely secondary to antibiotics    Clinically Significant Risk Factors          Follow-ups Needed After Discharge   Follow-up Appointments       Follow Up and recommended labs and tests      Follow up with [facility] physician.  The following labs/tests are recommended: BMP, CBC.              To follow up:  - Patient endorsed dysuria on 4/14, though suspected to be due to discomfort from her PureWick. Her PureWick was removed on 4/14, though patient unsure if she is still experiencing dysuria on 4/16.  - Please follow-up with her, and if she endorses dysuria, complete a UA with culture  - Follow-up with Neurology outpatient   - Discontinued gabapentin, as this had the highest likelihood for encephalopathy   - Continue on PTA baclofen for muscle spasms  - Follow-up with PCP in 2 weeks   - Repeat scan in 6 months recommended for evaluation of carotid aneurysm found in head CT on 4/8   - Further management of levothyroxine needed, TSH was elevated at 4.45 on admission 4/8  - Follow-up with sleep medicine   - Discuss management of armodafinil, which was held on 4/8 due to concerns it may have contributed to patient's encephalopathy  -Follow-up with Oncology with Dr. Martinez on 5/8/25    Discharge Disposition   Discharged to rehabilitation facility  Condition at discharge: Stable    Hospital Course   Marylee Woods is a 69 year old female admitted on 4/8/2025. She has past medical history of multiple sclerosis (ambulates with wheelchair), malignant neoplasm of left breast s/p lumpectomy in 2022,  MARYLU, HTN, anemia of chronic disease, and depression who presented for evaluation of altered mental status. Workup unremarkable for infectious or metabolic etiology. Felt this most likely represents sedation secondary to gabapentin in the setting of DENNISE, which has since improved with cessation. Neurology consulted on presentation, stroke workup unrevealing for acute process but noted to have incidental small R carotid saccular aneurysm requiring monitoring outpatient. EEG negative for seizure activity, felt no indication for MRI at this time. ID was consulted to evaluate MDRO UTI and felt this was less likely to represent true infection. She completed 3 days of Cipro out of abundance of caution. She is back to her cognitive baseline with plan for TCU prior to discharge to home.      Acute Encephalopathy, Toxic Metabolic due to Medication, now resolved  Patient presented with intermittent episodes of decreased mentation over the last few months lasting hours to a day, but more recently lasting multiple days. After evaluation this admission and review of prior hospitalizations, discussions with specialty teams, highest suspicion for encephalopathy due to medication effect (gabapentin) in setting of fluctuating renal function.  --Strongly suspect medication effect with gabapentin in the setting of likely fluctuating renal clearance as encephalopathy resolved with cessation of this medication and she has been on fluctuating doses of this medication for at least the past month across 2 hospitalizations with waxing and waning mental status reported  -- Neurology consulted, lower suspicion for MS flare, negative seizure workup with EEG, low suspicion for CNS infection and neurology recommending against MRI repeat during this admission.  --Urine cultures showing MDRO with enterobacter (pip/tazo resistant), enterococcus, and staph haemolyticus. Consult completed with ID who felt that infectious etiology is less likely to be  "cause of encephalopathy (please note patient's encephalopathy resolved without antibiotics that covered organisms in urine culture). ID noted it was not unreasonable to treat with cipro x3d for purpose of urinary clearance, also had low concern for CNS infection as again patient improved without antibiotic coverage that offered CNS penetration.  Patient completed treatment for urine clearance on 4/14 with ciprofloxacin.  - Holding PTA prophylactic Macrobid on discharge given above- PCP follow up to discuss whether UTI ppx indicated   --Mildly elevated TSH but with normal T4, currently on levothyroxine.  --4/8/25 CT head and CTA head/neck with contrast completed, currently without concern for intracranial structural abnormality or hemorrhage, Neurology Stroke team reviewed imaging and did not see anything that they felt caused the clinical presentation present on admission.     Multiple sclerosis  History of spasticity secondary to MS  - Consulted PM&R for assistance in managing spasticity:              - Patient stable on baclofen for many years, returned to PTA regimen during this admission at that changes in mentation  - Ensure pt has follow up with neurologist that manages her MS  - PT and OT  - Spouse bring patient AFOs in for future therapy  - Patient follows with Dr. Redman at [facility] neurology [facility]     DENNISE, resolved  Elevated Cystatin C with estimated GFR of 32, creatinine fluctuating 0.60 to 1.22 since 3/9/2025. Suspect prerenal secondary to poor oral intake given evidence of hyaline casts and improvement of Cr with increased oral intake.      Carotid artery saccular aneurysm:   4/8/25 Head/neck CTA with/without contrast noted per Radiology \"Distal cavernous right internal carotid artery saccular  aneurysm measuring 3 x 3 mm.\" This imaging was ordered per stroke protocol by Neurology in ED and reviewed by Neuro Stroke team.   --Recommend repeat imaging in 6 months to ensure " stability     Diarrhea, resolved, likely secondary to antibiotics  Patient noted to have increased volume of stools in the setting of antibiotic usage.  C. difficile PCR negative 4/13.     Concern for coccyx pressure wound  Per nursing note on 4/9, was found to have pressure wound on coccyx.   -WOC team was consulted for sacrum and reported patient has some scar tissue noted, no current concerns. Mepilex in place for prevention.   -Continue low air loss mattress for pressure sore prevention     HSV Ppx  - Continue PTA Acyclovir, patient is uncertain when she is taking this medication or who prescribes it (thinks perhaps her Neurologist prescribed it), but is willing to discuss this at next follow-up in clinic and continue for now.     Hypertension  - Continued PTA amlodipine and losartan     Depression  - Continue PTA Cymbalta     History of malignant neoplasm left breast status postlumpectomy  - Follows with Dr. 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 TSH was elevated on 4/8 at 4.45.  - Continue PTA levothyroxine   - Follow-up with PCP for further management     Glaucoma   - Continue PTA latanoprost        Consultations This Hospital Stay   NEUROLOGY GENERAL ADULT IP CONSULT  CARE MANAGEMENT / SOCIAL WORK IP CONSULT  PHYSICAL MEDICINE & REHAB GENERAL ADULT IP CONSULT  PHYSICAL THERAPY ADULT IP CONSULT  OCCUPATIONAL THERAPY ADULT IP CONSULT  INFECTIOUS DISEASE GENERAL ADULT IP CONSULT  SOCIAL WORK IP CONSULT  PHYSICAL THERAPY ADULT IP CONSULT  OCCUPATIONAL THERAPY ADULT IP CONSULT  INFECTIOUS DISEASE GENERAL ADULT IP CONSULT    Code Status   Full Code    Time Spent on this Encounter   Clair COLON, personally saw the patient today and spent  less than or equal to 30 minutes discharging this patient.       SWAPNIL Davis CRNA  [facility] [address] [address]  Phone: [phone]        Physician Attestation   I, Monica Alves DO, was present with the medical/ABHISHEK student who participated in the service and in the documentation of the note.  I have verified the history and personally performed the physical exam and medical decision making.  I agree with the assessment and plan of care as documented in the note.      Small findings: Marylee presented with encephalopathy. Thorough workup largely unrevealing. Held gabapentin with improvement and will continue to hold on discharge. She completed three days of Cipro to cover for possible UTI, though felt unlikely to represent true infection per ID. She will follow up with PCP and neurology outpatient.     Please see A&P for additional details of medical decision making.          Monica Alves DO  Date of Service (when I saw the patient): 04/16/25   ______________________________________________________________________    Physical Exam   Vital Signs: Temp: 97.1  F (36.2  C) Temp src: Oral BP: 118/52 Pulse: 72   Resp: 18 SpO2: 99 % O2 Device: None (Room air)    Weight: 155 lbs 11.2 oz    Gen: Alert and oriented, NAD  HEENT: Normocephalic, sclera non-icteric.   Respiratory: Normal respiratory effort.  Cardiac: RRR; extremities well perfused  Abdomen: Soft, non-distended; no TTP  Neuro/MSK: Patient is alert and oriented to self, time, and location. Able to track finger with eyes. Light touch sensation intact on face, upper, and lower extremities bilaterally. Face is symmetric with normal eye closure, smile, and puffing of cheeks. Shoulder shrug intact. 5/5 strength in upper extremities. 2/5 strength in hip and knee flexion bilaterally. L leg 2/5 strength for ankle flexion, R ankle flexion 3/5. Ambulates with wheelchair at baseline.        Primary Care  Physician   Carolina Pulliam    Discharge Orders      Primary Care - Care Coordination Referral      General info for SNF    Length of Stay Estimate: Short Term Care: Estimated # of Days <30  Condition at Discharge: Improving  Level of care:skilled   Rehabilitation Potential: Fair  Admission H&P remains valid and up-to-date: Yes  Recent Chemotherapy: N/A  Use Nursing Home Standing Orders: Yes     Follow Up and recommended labs and tests    Follow up with detention physician.  The following labs/tests are recommended: BMP, CBC.     Reason for your hospital stay    You were admitted with confusion and sleepiness that we think is due to medication effect with some changes in kidney function also probably contributing. You got better when we stopped some medications     Activity - Up with nursing assistance     Additional Discharge Instructions    For Marylee when she departs TCU:    Dear Marylee Woods    Your were hospitalized at [facility] with sleepiness and confused thinking and treated with medication adjustments.  Over your hospitalization your thinking improved and today you are ready to be discharged.  You should continue to improve but if you develop fever, shortness of breath, light headedness, chest pain or confusion again, please seek medical attention.    We are suggesting the following medication changes:  --Please stop gabapentin  --Please stop armodafenil until you talk with your sleep doctor  --Please stop nitrofurantoin    Please set up an appointment with:  --Please follow up with your Neurology clinic as scheduled  --Please check in with your primary care doctor within 2 weeks to make sure you are continuing to feel better. Please talk with your primary care doctor about ordering a repeat CT of your head and neck in 6 months (around 10/8/25) to ensure stability of an incidentally seen carotid artery aneurysm that is very small (found on imaging completed in the  ED).    It was a pleasure meeting with you today. Thank you for allowing me and my team the privilege of caring for you today. You are the reason we are here, and I truly hope we provided you with the excellent service you deserve. Please let us know if there is anything else we can do for you so that we can be sure you are leaving completely satisfied with your care experience.     Physical Therapy Adult Consult    Evaluate and treat as clinically indicated.    Reason:  deconditioning in setting of MS     Occupational Therapy Adult Consult    Evaluate and treat as clinically indicated.    Reason:  deconditioning in setting of MS     Diet    Follow this diet upon discharge: Current Diet:Orders Placed This Encounter      Snacks/Supplements Adult: Ensure Enlive; Between Meals      Combination Diet Regular Diet Adult       Significant Results and Procedures   Most Recent 3 CBC's:  Recent Labs   Lab Test 04/15/25  0710 04/14/25  0644 04/14/25  0004 04/12/25  0917   WBC 3.0* 2.8*  --  3.9*   HGB 9.2* 9.3*  --  9.7*   MCV 88 88  --  90    203 223 204     Most Recent 3 BMP's:  Recent Labs   Lab Test 04/14/25  0646 04/12/25  0917 04/11/25  0812    139 141   POTASSIUM 3.5 4.2 4.1   CHLORIDE 109* 109* 108*   CO2 20* 19* 21*   BUN 8.4 9.0 12.6   CR 0.74 0.72 0.84   ANIONGAP 11 11 12   JORDON 9.4 9.6 9.4   * 93 100*       Discharge Medications   Current Discharge Medication List        CONTINUE these medications which have NOT CHANGED    Details   acetaminophen (TYLENOL) 500 MG tablet Take 1-2 tablets (500-1,000 mg) by mouth every 8 hours as needed for mild pain or fever (greater than 101 degrees)      acyclovir (ZOVIRAX) 400 MG tablet TAKE 1 TABLET BY MOUTH EVERY 12 HOURS  Qty: 180 tablet, Refills: 2    Associated Diagnoses: HSV (herpes simplex virus) infection      amLODIPine (NORVASC) 5 MG tablet Take 1 tablet by mouth once daily  Qty: 90 tablet, Refills: 2    Associated Diagnoses: Hypertension goal BP  (blood pressure) < 140/90      baclofen (LIORESAL) 10 MG tablet Take 2 tablets (20 mg) in the morning and 3 tablets (30 mg) in the evening. May take one extra 20 mg dose in the afternoon as needed      Calcium Carbonate Antacid 600 MG CHEW Take 600 mg by mouth daily      CRANBERRY PO Take by mouth. Take 1 capsule once daily.      !! DULoxetine (CYMBALTA) 20 MG capsule TAKE 1 CAPSULE BY MOUTH ONCE DAILY ALONG  WITH  A  60  MG  TABLET  FOR  A  TOTAL  DOSE  OF  80  MG  DAILY  Qty: 90 capsule, Refills: 4    Associated Diagnoses: Moderate episode of recurrent major depressive disorder (H)      !! DULoxetine (CYMBALTA) 60 MG capsule Take 1 capsule by mouth once daily  Qty: 90 capsule, Refills: 4    Associated Diagnoses: Moderate episode of recurrent major depressive disorder (H)      estradiol (ESTRACE) 0.1 MG/GM vaginal cream Place 2 g vaginally twice a week.  Qty: 42.5 g, Refills: 4    Associated Diagnoses: Genitourinary syndrome of menopause      famotidine (PEPCID) 40 MG tablet TAKE 1 TABLET BY MOUTH AT BEDTIME  Qty: 90 tablet, Refills: 2    Associated Diagnoses: Gastroesophageal reflux disease without esophagitis      latanoprost (XALATAN) 0.005 % ophthalmic solution INSTILL 1 DROP INTO BOTH EYES EVERY DAY AS DIRECTED PT WILL NEED TO BE SEEN FOR FUTURE REFILLS      levothyroxine (SYNTHROID/LEVOTHROID) 112 MCG tablet Take 1 tablet (112 mcg) by mouth daily  Qty: 90 tablet, Refills: 3    Associated Diagnoses: Postablative hypothyroidism      losartan (COZAAR) 50 MG tablet Take 1 tablet (50 mg) by mouth 2 times daily  Qty: 180 tablet, Refills: 4    Associated Diagnoses: Hypertension goal BP (blood pressure) < 140/90      magnesium oxide (MAG-OX) 400 MG tablet Take 400 mg by mouth daily.      Multiple Vitamins-Minerals (MULTIPLE VITAMINS/WOMENS PO) Take by mouth daily      ondansetron (ZOFRAN ODT) 4 MG ODT tab Take 1 tablet (4 mg) by mouth every 6 hours as needed for nausea or vomiting.  Qty: 12 tablet, Refills: 0     Associated Diagnoses: Acute colitis       !! - Potential duplicate medications found. Please discuss with provider.        STOP taking these medications       amoxicillin (AMOXIL) 500 MG capsule Comments:   Reason for Stopping:         Armodafinil 200 MG TABS Comments:   Reason for Stopping:         gabapentin (NEURONTIN) 300 MG capsule Comments:   Reason for Stopping:         gabapentin (NEURONTIN) 300 MG capsule Comments:   Reason for Stopping:         nitroFURantoin macrocrystal-monohydrate (MACROBID) 100 MG capsule Comments:   Reason for Stopping:             Allergies   Allergies   Allergen Reactions    Bactrim [Sulfamethoxazole-Trimethoprim] Hallucination    Hydrochlorothiazide      Hyponatremia    Sulfamethizole     Amantadine      hallucinations    Budesonide     Budesonide-Formoterol Fumarate Rash

## 2025-04-14 NOTE — PROGRESS NOTES
CARE MANAGEMENT FOLLOW UP    Additional Information:   04/14: CHW delegated by JUSTIN, has completed the Pre-Admission Screening through [facility] [employer] [facility], which is needed for this Pt to be admitted to a TCU and the confirmation number is the following: [id number]    Zenaida Macdonald   Community Health Worker, [address].  [place], [place]   P [phone]   Fax: [phone]  Email: [email or web address]

## 2025-04-14 NOTE — PROGRESS NOTES
Care Management Follow Up    Length of Stay (days): 6    Expected Discharge Date: 04/14/2025     Concerns to be Addressed: none      Patient plan of care discussed at interdisciplinary rounds: Yes    Anticipated Discharge Disposition:  TCU     Anticipated Discharge Services:  n/a  Anticipated Discharge DME:  none at this time.    Patient/family educated on Medicare website which has current facility and service quality ratings:  yes  Education Provided on the Discharge Plan: yes   Patient/Family in Agreement with the Plan:  yes    Referrals Placed by CM/SW:    Private pay costs discussed: transportation costs    Discussed  Partnership in Safe Discharge Planning  document with patient/family: No     Handoff Completed: No, handoff not indicated or clinically appropriate    Additional Information:  JUSTIN met with pt and pt's spouse at bedside about TCU options and returning back to their previous TCU at     [facility]   [address] [place] [address]  Ph: [phone]  Admissions Ph: [phone]  F: [phone]     Pt stated that they do not want to go back after stating that they did not fine PT helpful there, and requested for a referral to  TCU due to staying there in the past, and [facility] (pt's spouse works for [employer]). JUSTIN then provided a list of TCU's in the area where the pt lives and encouraged the pt to select 3 more options in case the first two do not have bed openings. Pt and pt's spouse confirmed to look at the list.     After the meeting, JUSTIN referred pt to  TCU and left a voicemail to [facility], after not finding a referral link for that specific [facility]. JUSTIN then reached out to the  TCU liaison Mi about the referral. Mi confirmed that pt meets the requirements, but reports that there are currently no beds available for the week. Mi suggested that the SW and pt explore other TCUs.     Attending team then met with  the SW and the SW relayed the message about [facility] not having any bed openings, and leaving a vm with [facility].      Next Steps: SW to continue to look for TCUs for discharge.     LAKHWINDER Mazariegos   SCOTT  [facility]  Available via Vocera  [address]   Ph: [phone]

## 2025-04-14 NOTE — PLAN OF CARE
Goal Outcome Evaluation:Patient report achy bilat. Knees,relief with Tylenol.tolerated food/fluids.Voiding,no BM. Declined OOB to W/C.,turned every 2 hours.Noted bilat.buttock area redden,blanchable,intact.Requested barrier cream.Continue to mnitor

## 2025-04-15 ENCOUNTER — APPOINTMENT (OUTPATIENT)
Dept: PHYSICAL THERAPY | Facility: CLINIC | Age: 70
DRG: 092 | End: 2025-04-15
Payer: MEDICARE

## 2025-04-15 ENCOUNTER — TELEPHONE (OUTPATIENT)
Dept: FAMILY MEDICINE | Facility: CLINIC | Age: 70
End: 2025-04-15

## 2025-04-15 LAB
ERYTHROCYTE [DISTWIDTH] IN BLOOD BY AUTOMATED COUNT: 14.5 % (ref 10–15)
HCT VFR BLD AUTO: 27.9 % (ref 35–47)
HGB BLD-MCNC: 9.2 G/DL (ref 11.7–15.7)
MCH RBC QN AUTO: 28.9 PG (ref 26.5–33)
MCHC RBC AUTO-ENTMCNC: 33 G/DL (ref 31.5–36.5)
MCV RBC AUTO: 88 FL (ref 78–100)
PLATELET # BLD AUTO: 202 10E3/UL (ref 150–450)
RBC # BLD AUTO: 3.18 10E6/UL (ref 3.8–5.2)
WBC # BLD AUTO: 3 10E3/UL (ref 4–11)

## 2025-04-15 PROCEDURE — 250N000013 HC RX MED GY IP 250 OP 250 PS 637: Performed by: INTERNAL MEDICINE

## 2025-04-15 PROCEDURE — 97530 THERAPEUTIC ACTIVITIES: CPT | Mod: GP | Performed by: PHYSICAL THERAPIST

## 2025-04-15 PROCEDURE — 250N000013 HC RX MED GY IP 250 OP 250 PS 637

## 2025-04-15 PROCEDURE — 36415 COLL VENOUS BLD VENIPUNCTURE: CPT

## 2025-04-15 PROCEDURE — 120N000002 HC R&B MED SURG/OB UMMC

## 2025-04-15 PROCEDURE — 250N000011 HC RX IP 250 OP 636

## 2025-04-15 PROCEDURE — 85018 HEMOGLOBIN: CPT

## 2025-04-15 PROCEDURE — 99232 SBSQ HOSP IP/OBS MODERATE 35: CPT | Mod: GC | Performed by: STUDENT IN AN ORGANIZED HEALTH CARE EDUCATION/TRAINING PROGRAM

## 2025-04-15 RX ORDER — BACLOFEN 10 MG/1
20 TABLET ORAL DAILY
Status: DISCONTINUED | OUTPATIENT
Start: 2025-04-16 | End: 2025-04-16 | Stop reason: HOSPADM

## 2025-04-15 RX ORDER — BACLOFEN 10 MG/1
30 TABLET ORAL EVERY EVENING
Status: DISCONTINUED | OUTPATIENT
Start: 2025-04-15 | End: 2025-04-16 | Stop reason: HOSPADM

## 2025-04-15 RX ADMIN — HEPARIN SODIUM 5000 UNITS: 5000 INJECTION, SOLUTION INTRAVENOUS; SUBCUTANEOUS at 05:54

## 2025-04-15 RX ADMIN — LOSARTAN POTASSIUM 50 MG: 50 TABLET, FILM COATED ORAL at 08:12

## 2025-04-15 RX ADMIN — ACETAMINOPHEN 975 MG: 325 TABLET, FILM COATED ORAL at 21:30

## 2025-04-15 RX ADMIN — DULOXETINE 80 MG: 60 CAPSULE, DELAYED RELEASE ORAL at 08:12

## 2025-04-15 RX ADMIN — HEPARIN SODIUM 5000 UNITS: 5000 INJECTION, SOLUTION INTRAVENOUS; SUBCUTANEOUS at 14:22

## 2025-04-15 RX ADMIN — LOSARTAN POTASSIUM 50 MG: 50 TABLET, FILM COATED ORAL at 21:30

## 2025-04-15 RX ADMIN — ACYCLOVIR 400 MG: 400 TABLET ORAL at 08:12

## 2025-04-15 RX ADMIN — ACETAMINOPHEN 975 MG: 325 TABLET, FILM COATED ORAL at 14:22

## 2025-04-15 RX ADMIN — FAMOTIDINE 20 MG: 20 TABLET, FILM COATED ORAL at 21:30

## 2025-04-15 RX ADMIN — ACYCLOVIR 400 MG: 400 TABLET ORAL at 21:59

## 2025-04-15 RX ADMIN — AMLODIPINE BESYLATE 5 MG: 5 TABLET ORAL at 08:12

## 2025-04-15 RX ADMIN — LEVOTHYROXINE SODIUM 112 MCG: 0.11 TABLET ORAL at 05:54

## 2025-04-15 RX ADMIN — HEPARIN SODIUM 5000 UNITS: 5000 INJECTION, SOLUTION INTRAVENOUS; SUBCUTANEOUS at 21:30

## 2025-04-15 RX ADMIN — CALCIUM CARBONATE (ANTACID) CHEW TAB 500 MG 500 MG: 500 CHEW TAB at 14:22

## 2025-04-15 RX ADMIN — BACLOFEN 20 MG: 10 TABLET ORAL at 08:12

## 2025-04-15 RX ADMIN — BACLOFEN 30 MG: 10 TABLET ORAL at 21:30

## 2025-04-15 RX ADMIN — LATANOPROST 1 DROP: 50 SOLUTION OPHTHALMIC at 21:59

## 2025-04-15 ASSESSMENT — ACTIVITIES OF DAILY LIVING (ADL)
ADLS_ACUITY_SCORE: 89
ADLS_ACUITY_SCORE: 93
ADLS_ACUITY_SCORE: 89

## 2025-04-15 NOTE — PROGRESS NOTES
[facility]    Progress Note - Medicine Service, KENJI TEAM 5       Date of Admission:  4/8/2025    Assessment & Plan   Marylee Woods is a 69 year old female admitted on 4/8/2025. She has past medical history of multiple sclerosis (ambulates with wheelchair), malignant neoplasm of left breast s/p lumpectomy in 2022, MARYLU, HTN, anemia of chronic disease, and depression who presents for evaluation of altered mental status.      Today  - Patient continues with improved mental status that appears to be at her baseline  - s/p 3d course of ciprofloxacin completed 4/14  - Medically ready for discharge to TCU   -SW expects TCU placement by 2:30pm on 4/16  -Consider checking UA if patient experiences dysuria prior to discharge 4/16     Acute Encephalopathy, Toxic Metabolic 2/2 Medication, resolved  Multiple Sclerosis  Patient presented with intermittent episodes of decreased mentation over the last few months lasting hours to a day, but more recently lasting multiple days.   --Strongly suspect medication effect with gabapentin in the setting of likely fluctuating renal clearance as encephalopathy resolved with cessation of this medication and she has been on fluctuating doses of this medication for at least the past month across 2 hospitalizations with waxing and waning mental status reported  -- Neurology consulted, lower suspicion for lupus flare, negative seizure workup with EEG, low suspicion for CNS infection and neurology recommending against MRI this admission.  --Urine cultures showing MDRO with enterobacter (pip/tazo resistant), enterococcus, and staph haemolyticus. Discussed with ID, and it was felt that infectious etiology less likely but not unreasonable to treat with cipro x3d for urinary clearance, low concern for CNS infection. Now s/p cipro course.  --Mildly elevated TSH but with normal T4, currently on levothyroxine.  --CT head currently without concern for  intracranial structural abnormality or hemorrhage.    Dysuria  Likely due to PureWick, as patient noted improvement after its removal 4/15.  - Continue to monitor 4/16 prior to discharge, consider UA if not fully resolved     Right internal carotid artery saccular aneurysm  Found on CT of Head and neck on 4/8, measures 3 x 3 mm.   -Follow-up with outpatient PCP for further evaluation     DENNISE, resolved  Cr has remained WNL from 4/9-4/14, WNL a 0.74 on 4/14, down from 1.03 on 4/8. Suspect prerenal secondary to poor oral intake given evidence of hyaline casts and improvement of Cr with increased oral intake.      Coccyx pressure wound  Per nursing note on 4/9, was found to have wound on coccyx, likely pressure sore.   -Continue low air loss mattress for pressure sore prevention  -Consulted WOC     Diarrhea, resolved  Patient has had episodes of diarrhea in the each morning since 4/11, though resolved on 4/14.  -C diff negative 4/13      HSV Ppx  - Cont PTA Acyclovir     Hypertension  - Continued PTA amlodipine and losartan     Depression  - Continued PTA Cymbalta     History of malignant neoplasm left breast status postlumpectomy  - Follows with Dr. 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   - Continued PTA levothyroxine      Glaucoma   - Continued PTA latanoprost        Diet: Combination Diet Regular Diet Adult  Diet  Snacks/Supplements Adult: Ensure Clear; Between Meals    DVT Prophylaxis: Heparin SQ  Verduzco Catheter: Not present  Fluids: none  Lines: None     Cardiac Monitoring: None  Code Status: Full Code      Clinically Significant Risk Factors          # Hyperchloremia: Highest Cl = 109 mmol/L in last 2 days, will monitor as appropriate              # Hypertension: Noted on problem list                # Financial/Environmental Concerns:           Social Drivers of  Health   Tobacco Use: Medium Risk (3/21/2025)    Patient History     Smoking Tobacco Use: Former     Smokeless Tobacco Use: Never     Passive Exposure: Current   Physical Activity: Inactive (4/2/2024)    Exercise Vital Sign     Days of Exercise per Week: 0 days     Minutes of Exercise per Session: 20 min   Social Connections: Unknown (4/2/2024)    Social Connection and Isolation Panel [NHANES]     Frequency of Social Gatherings with Friends and Family: Once a week         Disposition Plan     Medically Ready for Discharge: Anticipated Tomorrow         The patient's care was discussed with the Attending Physician, Dr. Alves and Chief Resident/Fellow.    Clair Simmons  Medical Student  Medicine Service, [address]  Securely message with [employer] (more info)  Text page via [employer] Paging/Directory   See signed in provider for up to date coverage information  ______________________________________________________________________    Interval History     NAEO. She is awaiting placement to a TCU. She notes some pain with urination, though suspects this may be due to the PureWick. After removing the PureWick she feels this has resolved. Denies urinary urgency/frequency, fever, chills.     Physical Exam   Vital Signs: Temp: 98.3  F (36.8  C) Temp src: Oral BP: 139/62 Pulse: 90   Resp: 18 SpO2: 97 % O2 Device: None (Room air)    Weight: 155 lbs 11.2 oz    Gen: Alert and oriented, NAD  HEENT: Normocephalic, sclera non-icteric.   Respiratory: Normal respiratory effort.  Cardiac: RRR; extremities well perfused  Abdomen: Soft, non-distended; no TTP  Neuro/MSK: Patient is alert and oriented to self, time, and location. Able to track finger with eyes. Light touch sensation intact on face, upper, and lower extremities bilaterally. Face is symmetric with normal eye closure, smile, and puffing of cheeks. Shoulder shrug intact. 5/5 strength in upper extremities. 2/5 strength  in hip and knee flexion bilaterally. L leg 2/5 strength for ankle flexion, R ankle flexion 3/5. Ambulates with wheelchair at baseline.     Medical Decision Making       Please see A&P for additional details of medical decision making.      Data   ------------------------- PAST 24 HR DATA REVIEWED -----------------------------------------------    I have personally reviewed the following data over the past 24 hrs:    3.0 (L)  \   9.2 (L)   / 202     N/A N/A N/A /  N/A   N/A N/A N/A \       Imaging results reviewed over the past 24 hrs:   No results found for this or any previous visit (from the past 24 hours).

## 2025-04-15 NOTE — PLAN OF CARE
Goal Outcome Evaluation:      Plan of Care Reviewed With: patient    Overall Patient Progress: no changeOverall Patient Progress: no change  Neuro: AOx4, cooperative with cares  Cardiac: WDL, denies chest pain  Respiratory: on RA, denies SOB  GI/: purwick in placed, passing flatus, no BM, +BS  Diet/Appetite: regular, denies N/V  Skin: mepilex to coccyx, no new deficit  LDA: PIV-SL  Activity: Ax2 with repo.   Pain: denies  Plan: awaiting TCU placement, continue POC

## 2025-04-15 NOTE — PROGRESS NOTES
Occupational Therapy Discharge Summary    Reason for therapy discharge:    Discharged to transitional care facility.    Progress towards therapy goal(s). See goals on Care Plan in [facility] electronic health record for goal details.  Goals partially met.  Barriers to achieving goals:   Physical therapy to assist with continued goals of SBT and sitting tolerance.    Therapy recommendation(s):    No further therapy is recommended.  Physical therapy to continue to follow pt for progression of SBT as well as sitting tolerance/core activation. Pt requires assist for ADLs at baseline and will continue to progress with transfers with one discipline following.

## 2025-04-15 NOTE — PLAN OF CARE
Goal Outcome Evaluation:      Plan of Care Reviewed With: patient    Overall Patient Progress: no changeOverall Patient Progress: no change       Time: 5641-4923     Vitals: B/P: 139/62, T: 98.3, P: 90, R: 18   Neuro: A&Ox4, makes needs known  Cardiac: Denies chest pain, WDL  Resp: Denies SOB, sating >92% on RA  GI: last BM 4/13, passing gas  : Voiding Spontaneously - AUOP, brief on per pt  Pain: PRN tylenol given x1  N/V: Denies  Skin: coccyx wound - mepilex in place, changed today  Lines: PIV - SL  Drains: none  Labs: Reviewed  Diet: Regular, tolerating well  Activity: Ax2 w/lift, turn Q2     Plan: Waiting on TCU placement, possible discharge at 2:30 pm. Continue with POC.       Nati Allen RN

## 2025-04-15 NOTE — PROGRESS NOTES
Care Management Follow Up    Length of Stay (days): 7    Expected Discharge Date: 04/16/2025     Concerns to be Addressed:       Patient plan of care discussed at interdisciplinary rounds: Yes    Anticipated Discharge Disposition:  Transitional Care Facility     Anticipated Discharge Services:  none  Anticipated Discharge DME:  none at this time    Patient/family educated on Medicare website which has current facility and service quality ratings:  yes  Education Provided on the Discharge Plan:  yes  Patient/Family in Agreement with the Plan:  yes    Referrals Placed by CM/SW:      Skye Martinez ([facility])  [address] [place] [address]  [phone]   4/15: Initial referral placed by SW    [facility] [address] [address]  P: [phone] (Rita)  F: [phone]   4/15: Initial referral placed by SW    Private pay costs discussed: transportation costs    Discussed  Partnership in Safe Discharge Planning  document with patient/family: No     Handoff Completed: No, handoff not indicated or clinically appropriate    Additional Information:  SW received a phone call from [facility] in regards to the referral that was placed yesterday. Admissions stated that they could take pt, but confirmed that it's still the original place where she was at, and there is not a second location. SW confirmed that he would need to talk to the pt and pt's spouse again as the pt stated that they do not want to go back there. Admissions confirmed that a bed would be available after 11:00am.     SW then met with pt at bedside and provided a new list of [facility] TCUs. Pt also called their spouse to be a part of the conversation. Pt was informed that as of now she was accepted to her old TCU and the rest are currently pending. SW confirmed to continue to follow up with them if the pt chooses to not go back to original TCU. Pt was adamant about not  "going back. Pt's spouse attempted to inform her that they can provide more feedback about the lack of PT if she chooses to go there. Pt still declined stating, \"they were not helpful with PT\". Pt and pt's spouse were able to identify putting a referral into [facility] and [facility] [place]. Pt and pt's spouse informed the SW that they are really focused on finding something nearby [facility] [place] or [place]. SW then confirmed to put the referrals in and would report back if any new development occurs in the afternoon. Pt's spouse confirmed that he would be in the hospital later on this afternoon.      SW then placed in the additional referrals and informed [facility] that the pt has declined. The [facility] did confirm that a bed would be available tomorrow as well if the pt decides to change their mind.     SW then checked in with pt again about updates around [facility]. Pt stated that she was open to going back now to her former TCU at [facility] since they accepted her, but asked the SW to advocate for her about getting more PT than her last experience. SW confirmed to do so. Pt then stated that her  was in a meeting at work around 3:00pm and would call him about the plan once he is out. SW then provided the pt with the address for the hospital and address for the TCU so pt could schedule metro mobility ride. Pt declined for any assistance. Pt and SW confirmed to schedule the ride between 2:00pm-2:30pm. SW confirmed to stop by pt's room tomorrow for updates around the discharge.     JUSTIN then left a voicemail to [facility] about pt being interested in going back tomorrow.     Next Steps: JUSTIN will continue to follow up with pending TCU referrals for discharge. SW will check in tomorrow about possible [facility].     Santi Maldonado, LAKHWINDER   SCOTT  [facility]  Available via Vocera  [address] JUSTIN  Ph: [phone]      "

## 2025-04-15 NOTE — TELEPHONE ENCOUNTER
FYI - Status Update    Who is Calling: patient    Update: Patient wanted Dr Pulliam to know she is in the hospital and will be moved to a TCU    Does caller want a call/response back: No

## 2025-04-15 NOTE — PROGRESS NOTES
Blood pressure (!) 148/73, pulse 95, temperature 98.3  F (36.8  C), temperature source Oral, resp. rate 18, weight 70.6 kg (155 lb 11.2 oz), last menstrual period 01/31/2004, SpO2 98%, not currently breastfeeding.    Activity: A2 w/lift NOOB, T&R q2h  Neuros:  AOX4 makes needs known  Cardiac: HTN/Tachy, denies chest pain  Respiratory: WDL, RA  GI/: AUOP, Oliguria, PureWick  Diet: Regular, tolerating  Skin/Incisions/Drains: Blanchable coccyx w/mepilex,   Lines: Lt PIV TKO  Pain: Schedule meds  Plan: Waiting for TCU plackement

## 2025-04-15 NOTE — PROGRESS NOTES
CLINICAL NUTRITION SERVICES    Reviewed nutrition risk factors due to LOS. No concerns with oral intake per pt/[employer] (room service meal ordering system). Reviewed wt hx. No unintentional wt loss. No indication of pressure injury.    MD ordered Ensure chocolate flavor for patient.  Patient reports she has diarrhea with this and prefers Ensure Clear berry.  Modified supplement order for this.     Follow Up / Monitoring:   Per policy unless consulted    Rach Son, MS, RD, LD, CCTD, CNSC  7A + 7B (beds 2421-3920)  Available on Vocera [7A or 7B Clinical Dietitian]   Weekend/Holiday: Vocera [Weekend Clinical Dietitian]

## 2025-04-16 ENCOUNTER — APPOINTMENT (OUTPATIENT)
Dept: PHYSICAL THERAPY | Facility: CLINIC | Age: 70
DRG: 092 | End: 2025-04-16
Payer: MEDICARE

## 2025-04-16 VITALS
RESPIRATION RATE: 18 BRPM | WEIGHT: 155.7 LBS | OXYGEN SATURATION: 99 % | HEART RATE: 72 BPM | SYSTOLIC BLOOD PRESSURE: 118 MMHG | BODY MASS INDEX: 23.67 KG/M2 | DIASTOLIC BLOOD PRESSURE: 52 MMHG | TEMPERATURE: 97.1 F

## 2025-04-16 PROCEDURE — 99239 HOSP IP/OBS DSCHRG MGMT >30: CPT | Performed by: STUDENT IN AN ORGANIZED HEALTH CARE EDUCATION/TRAINING PROGRAM

## 2025-04-16 PROCEDURE — 250N000011 HC RX IP 250 OP 636

## 2025-04-16 PROCEDURE — 250N000013 HC RX MED GY IP 250 OP 250 PS 637: Performed by: INTERNAL MEDICINE

## 2025-04-16 PROCEDURE — 250N000013 HC RX MED GY IP 250 OP 250 PS 637

## 2025-04-16 PROCEDURE — 97530 THERAPEUTIC ACTIVITIES: CPT | Mod: GP | Performed by: PHYSICAL THERAPIST

## 2025-04-16 RX ADMIN — BACLOFEN 20 MG: 10 TABLET ORAL at 08:56

## 2025-04-16 RX ADMIN — ACYCLOVIR 400 MG: 400 TABLET ORAL at 08:56

## 2025-04-16 RX ADMIN — ACETAMINOPHEN 975 MG: 325 TABLET, FILM COATED ORAL at 05:24

## 2025-04-16 RX ADMIN — HEPARIN SODIUM 5000 UNITS: 5000 INJECTION, SOLUTION INTRAVENOUS; SUBCUTANEOUS at 05:23

## 2025-04-16 RX ADMIN — HEPARIN SODIUM 5000 UNITS: 5000 INJECTION, SOLUTION INTRAVENOUS; SUBCUTANEOUS at 13:12

## 2025-04-16 RX ADMIN — AMLODIPINE BESYLATE 5 MG: 5 TABLET ORAL at 08:56

## 2025-04-16 RX ADMIN — DULOXETINE 80 MG: 60 CAPSULE, DELAYED RELEASE ORAL at 08:56

## 2025-04-16 RX ADMIN — LOSARTAN POTASSIUM 50 MG: 50 TABLET, FILM COATED ORAL at 08:56

## 2025-04-16 RX ADMIN — LEVOTHYROXINE SODIUM 112 MCG: 0.11 TABLET ORAL at 05:23

## 2025-04-16 ASSESSMENT — ACTIVITIES OF DAILY LIVING (ADL)
ADLS_ACUITY_SCORE: 93

## 2025-04-16 NOTE — PROGRESS NOTES
Care Management Discharge Note    Discharge Date: 04/16/2025       Discharge Disposition: Transitional Care    [facility]  [address] [place]  [address]  P: [phone]  P: [phone] - Admissions  F: [phone]     Discharge Services: None    Discharge DME: None    Discharge Transportation: metro mobility ride set up for 2:30pm    Private pay costs discussed: transportation costs    Does the patient's insurance plan have a 3 day qualifying hospital stay waiver?  Yes     Which insurance plan 3 day waiver is available? Alternative insurance waiver    Will the waiver be used for post-acute placement? Yes    PAS Confirmation Code: [id number]  Patient/family educated on Medicare website which has current facility and service quality ratings: yes    Education Provided on the Discharge Plan: Yes  Persons Notified of Discharge Plans: pt, pt's spouse, facility, attending team, nursing  Patient/Family in Agreement with the Plan:      Handoff Referral Completed: No, handoff not indicated or clinically appropriate    Additional Information:  SW confirmed with TCU staff and pt that the plan is still for the pt to discharge back to the TCU around 2:30pm. TCU staff confirmed that this would work, and pt confirmed that the metro mobility ride is set for 2:30pm. Pt also confirmed that she notified her  about the discharge when the SW met with pt at bedside to confirm the ride. Nursing confirmed to bring pt down around 2:15pm to the front entrance. IMM was provided on 4/14 and PAS was completed prior to discharge.     LAKHWINDER Mazariegos   SCOTT  [facility]  Available via [employer]  [address]   Ph: [phone]

## 2025-04-16 NOTE — PLAN OF CARE
Goal Outcome Evaluation:      Plan of Care Reviewed With: patient    Overall Patient Progress: no changeOverall Patient Progress: no change    Temp: 98.5  F (36.9  C) Temp src: Oral BP: (!) 144/74 Pulse: 90   Resp: 19 SpO2: 96 % O2 Device: None (Room air)       Neuro: Alert and Oriented  x4, let needs known  Cardiac: WNL, denies cardiac chest pain, lowers non edematous  Respiratory: LS clear and dim on room air   GI/: Voiding AUOP; known oliguria but adequate UOP overnight; last BM 4-13  Diet/Appetite: regular, denies nausea  Skin: blanchable redness to coccyx with mepilex in place; applied cream for discomfort to bottom  LDA: L PIV SL  Activity: NOOB this shift  Pain: gave PRN tylenol x2; scheduled baclofen and repositioning was most helpful  Plan: discharge to TCU 4-16  at 1430

## 2025-04-16 NOTE — PLAN OF CARE
Pt. discharged at 1:40 pm to [facility], was accompanied by her  and transported with metro mobility, and left with personal belongings. Pt. received complete discharge paperwork and medications as filled by discharge pharmacy. Pt. was given times of last dose for all discharge medications in writing on discharge medication sheets. Discharge teaching included medication instructions, pain management, activity restrictions, and signs and symptoms of infection. Dressing supplies sent home. Pt. had no further questions at the time of discharge and no unmet needs were identified.

## 2025-04-16 NOTE — TELEPHONE ENCOUNTER
"\"Can you see if you can find her an appointment with me in next 2 weeks for hospital follow up?  Thank you!  Let me know if you need help finding a spot\"      Connected to patient who is now in TCU. Unsure how long. Advised to connect to care team when aware of discharge date/is discharged and we will help get on calendar-  "

## 2025-04-17 ENCOUNTER — TRANSITIONAL CARE UNIT VISIT (OUTPATIENT)
Dept: GERIATRICS | Facility: CLINIC | Age: 70
End: 2025-04-17
Payer: MEDICARE

## 2025-04-17 ENCOUNTER — DOCUMENTATION ONLY (OUTPATIENT)
Dept: GERIATRICS | Facility: CLINIC | Age: 70
End: 2025-04-17

## 2025-04-17 ENCOUNTER — PATIENT OUTREACH (OUTPATIENT)
Dept: CARE COORDINATION | Facility: CLINIC | Age: 70
End: 2025-04-17

## 2025-04-17 VITALS
HEIGHT: 68 IN | RESPIRATION RATE: 18 BRPM | BODY MASS INDEX: 29.37 KG/M2 | WEIGHT: 193.8 LBS | OXYGEN SATURATION: 94 % | HEART RATE: 65 BPM | TEMPERATURE: 98.2 F | DIASTOLIC BLOOD PRESSURE: 55 MMHG | SYSTOLIC BLOOD PRESSURE: 114 MMHG

## 2025-04-17 DIAGNOSIS — R41.89 EPISODE OF ALTERED COGNITION: Primary | ICD-10-CM

## 2025-04-17 DIAGNOSIS — Z79.899 POLYPHARMACY: ICD-10-CM

## 2025-04-17 DIAGNOSIS — E03.8 OTHER SPECIFIED HYPOTHYROIDISM: ICD-10-CM

## 2025-04-17 DIAGNOSIS — N17.9 ACUTE KIDNEY INJURY: ICD-10-CM

## 2025-04-17 DIAGNOSIS — G35 SPASTIC PARAPLEGIA SECONDARY TO MULTIPLE SCLEROSIS (H): ICD-10-CM

## 2025-04-17 DIAGNOSIS — G47.33 OSA (OBSTRUCTIVE SLEEP APNEA): ICD-10-CM

## 2025-04-17 DIAGNOSIS — C50.412 MALIGNANT NEOPLASM OF UPPER-OUTER QUADRANT OF LEFT BREAST IN FEMALE, ESTROGEN RECEPTOR POSITIVE (H): ICD-10-CM

## 2025-04-17 DIAGNOSIS — R30.0 DYSURIA: ICD-10-CM

## 2025-04-17 DIAGNOSIS — K59.01 SLOW TRANSIT CONSTIPATION: ICD-10-CM

## 2025-04-17 DIAGNOSIS — R53.81 PHYSICAL DECONDITIONING: ICD-10-CM

## 2025-04-17 DIAGNOSIS — I67.1 RIGHT INTERNAL CAROTID ARTERY ANEURYSM: ICD-10-CM

## 2025-04-17 DIAGNOSIS — N95.8 GENITOURINARY SYNDROME OF MENOPAUSE: ICD-10-CM

## 2025-04-17 DIAGNOSIS — I10 ESSENTIAL HYPERTENSION: ICD-10-CM

## 2025-04-17 DIAGNOSIS — G35 MS (MULTIPLE SCLEROSIS) (H): ICD-10-CM

## 2025-04-17 DIAGNOSIS — G82.20 SPASTIC PARAPLEGIA SECONDARY TO MULTIPLE SCLEROSIS (H): ICD-10-CM

## 2025-04-17 DIAGNOSIS — F32.9 MAJOR DEPRESSIVE DISORDER, REMISSION STATUS UNSPECIFIED, UNSPECIFIED WHETHER RECURRENT: ICD-10-CM

## 2025-04-17 DIAGNOSIS — Z17.0 MALIGNANT NEOPLASM OF UPPER-OUTER QUADRANT OF LEFT BREAST IN FEMALE, ESTROGEN RECEPTOR POSITIVE (H): ICD-10-CM

## 2025-04-17 PROCEDURE — 99310 SBSQ NF CARE HIGH MDM 45: CPT

## 2025-04-17 NOTE — PROGRESS NOTES
[facility] GERIATRICS    PRIMARY CARE PROVIDER AND CLINIC:  Carolina Pulliam MD, [address] [address] / [place] [address]***  Chief Complaint   Patient presents with    Hospital F/U      [facility] Record Number:  [id number]  Place of Service where encounter took place:  [facility] ([facility]) [[id number]]    Marylee Woods  is a 69 year old  (1955), admitted to the above facility from  [facility]. Hospital stay 4/8/25 through 4/16/25..   HPI:    ***    CODE STATUS/ADVANCE DIRECTIVES DISCUSSION:  Prior  {CODE STATUS:431805}  ALLERGIES:   Allergies   Allergen Reactions    Bactrim [Sulfamethoxazole-Trimethoprim] Hallucination    Hydrochlorothiazide      Hyponatremia    Sulfamethizole     Amantadine      hallucinations    Budesonide     Budesonide-Formoterol Fumarate Rash      PAST MEDICAL HISTORY:   Past Medical History:   Diagnosis Date    Acute cystitis without hematuria     Acute pain of right knee     Atypical ductal hyperplasia of left breast 02/2022    Candida esophagitis - 2019 (H)     CKD (chronic kidney disease) stage 3, GFR 30-59 ml/min (H)     Community acquired pneumonia, unspecified laterality     Cough, unspecified type 04/05/2024    Depression     Epigastric pain     Former tobacco use     Fracture of femur, distal, left, closed (H)     Gastro-oesophageal reflux disease     Graves disease     History of fracture of fibula     History of tibial fracture     HSV (herpes simplex virus) infection     Hyperlipidemia with target LDL less than 130     Hyponatremia     L tib fx s/p IM nailing     Lung nodules     Currently managed with follow up imaging by [facility] result Team.       Multiple sclerosis (H)     Osteoporosis     Postablative hypothyroidism       PAST SURGICAL HISTORY:   has a past surgical history that includes hip surgery (2009); c/section, low transverse (1992); Laparoscopic cholecystectomy (6/24/2014);  colonoscopy (2007); Esophagoscopy, gastroscopy, duodenoscopy (EGD), combined (1/26/2017); orthopedic surgery (2009); Open reduction internal fixation rodding intramedullary tibia (4/14/2013); Esophagoscopy, gastroscopy, duodenoscopy (EGD), combined (N/A, 1/26/2017); Colonoscopy (N/A, 1/26/2017); Open reduction internal fixation femur distal (Left, 11/1/2018); Lumpectomy breast (Left, 3/31/2022); and Esophagoscopy, gastroscopy, duodenoscopy (EGD), combined (N/A, 4/20/2023).  FAMILY HISTORY: family history includes Cancer in her maternal grandfather; Heart Disease in her father, maternal grandmother, mother, and paternal grandfather.  SOCIAL HISTORY:   reports that she quit smoking about 3 years ago. Her smoking use included cigarettes. She has been exposed to tobacco smoke. She has never used smokeless tobacco. She reports current alcohol use. She reports that she does not use drugs.  Patient's living condition: {LIVES WITH (NURSING HOME):063151}    Post Discharge Medication Reconciliation Status:   MED REC REQUIRED{TIP  Click the link below to document or use med rec list, use list to pull in response :891692}  Post Medication Reconciliation Status: {MED REC LIST:779152}       Current Outpatient Medications   Medication Sig Dispense Refill    acetaminophen (TYLENOL) 500 MG tablet Take 1-2 tablets (500-1,000 mg) by mouth every 8 hours as needed for mild pain or fever (greater than 101 degrees)      acyclovir (ZOVIRAX) 400 MG tablet TAKE 1 TABLET BY MOUTH EVERY 12 HOURS 180 tablet 2    amLODIPine (NORVASC) 5 MG tablet Take 1 tablet by mouth once daily 90 tablet 2    baclofen (LIORESAL) 10 MG tablet Take 2 tablets (20 mg) in the morning and 3 tablets (30 mg) in the evening. May take one extra 20 mg dose in the afternoon as needed      Calcium Carbonate Antacid 600 MG CHEW Take 600 mg by mouth daily      CRANBERRY PO Take by mouth. Take 1 capsule once daily.      DULoxetine (CYMBALTA) 20 MG capsule TAKE 1 CAPSULE BY  "MOUTH ONCE DAILY ALONG  WITH  A  60  MG  TABLET  FOR  A  TOTAL  DOSE  OF  80  MG  DAILY 90 capsule 4    DULoxetine (CYMBALTA) 60 MG capsule Take 1 capsule by mouth once daily (Patient taking differently: Take 1 capsule by mouth once daily, along with 20 mg for total of 80 mg daily dose.) 90 capsule 4    estradiol (ESTRACE) 0.1 MG/GM vaginal cream Place 2 g vaginally twice a week. (Patient taking differently: Place 2 g vaginally daily. For 2 weeks.) 42.5 g 4    famotidine (PEPCID) 40 MG tablet TAKE 1 TABLET BY MOUTH AT BEDTIME 90 tablet 2    latanoprost (XALATAN) 0.005 % ophthalmic solution INSTILL 1 DROP INTO BOTH EYES EVERY DAY AS DIRECTED PT WILL NEED TO BE SEEN FOR FUTURE REFILLS      levothyroxine (SYNTHROID/LEVOTHROID) 112 MCG tablet Take 1 tablet (112 mcg) by mouth daily 90 tablet 3    losartan (COZAAR) 50 MG tablet Take 1 tablet (50 mg) by mouth 2 times daily 180 tablet 4    magnesium oxide (MAG-OX) 400 MG tablet Take 400 mg by mouth daily.      Multiple Vitamins-Minerals (MULTIPLE VITAMINS/WOMENS PO) Take by mouth daily      ondansetron (ZOFRAN ODT) 4 MG ODT tab Take 1 tablet (4 mg) by mouth every 6 hours as needed for nausea or vomiting. 12 tablet 0     No current facility-administered medications for this visit.       ROS:  {ROS FGS:724504}    Vitals:  /55   Pulse 65   Temp 98.2  F (36.8  C)   Resp 18   Ht 1.727 m (5' 8\")   Wt 87.9 kg (193 lb 12.8 oz)   LMP 01/31/2004 (Approximate)   SpO2 94%   BMI 29.47 kg/m    Exam:  GENERAL APPEARANCE:  Alert,in no distress  HEENT:  atraumatic, EOM intact, moist mucus membranes  RESP:  non-labored breathing, lungs clear on auscultation, no respiratory distress, no cough  CV:  Rate regular, S1 S2 noted, 1+ BLE edema  ABDOMEN:  soft, non-distended, non-tender, bowel sounds active  M/S:   wheelchair bound, strength and tone equal bilaterally, no calf pain, arthritic changes noted in hands  SKIN:  warm, dry, thin, fragile, no obvious rash, lesions, ulcerations " or petechiae   NEURO:   Face is symmetric, examination of sensation by touch normal, follows and tracks, slow speech  PSYCH:  calm, cooperative      Lab/Diagnostic data:  {fgslab:977328}    ASSESSMENT/PLAN:    {[facility] DX2:989765}    Orders:  {sorders:116173}  ***  Start miralax 17 g daily    Electronically signed by:  Jennifer Varma ***                 "TO BE SEEN FOR FUTURE REFILLS      levothyroxine (SYNTHROID/LEVOTHROID) 112 MCG tablet Take 1 tablet (112 mcg) by mouth daily 90 tablet 3    losartan (COZAAR) 50 MG tablet Take 1 tablet (50 mg) by mouth 2 times daily 180 tablet 4    magnesium oxide (MAG-OX) 400 MG tablet Take 400 mg by mouth daily.      Multiple Vitamins-Minerals (MULTIPLE VITAMINS/WOMENS PO) Take by mouth daily      ondansetron (ZOFRAN ODT) 4 MG ODT tab Take 1 tablet (4 mg) by mouth every 6 hours as needed for nausea or vomiting. 12 tablet 0     No current facility-administered medications for this visit.       ROS:  4 point ROS including Respiratory, CV, GI and , other than that noted in the HPI,  is negative    Vitals:  /55   Pulse 65   Temp 98.2  F (36.8  C)   Resp 18   Ht 1.727 m (5' 8\")   Wt 87.9 kg (193 lb 12.8 oz)   LMP 01/31/2004 (Approximate)   SpO2 94%   BMI 29.47 kg/m    Exam:  GENERAL APPEARANCE:  Alert,in no distress  HEENT:  atraumatic, EOM intact, moist mucus membranes  RESP:  non-labored breathing, lungs clear on auscultation, no respiratory distress, no cough  CV:  Rate regular, S1 S2 noted, 1+ BLE edema  ABDOMEN:  soft, non-distended, non-tender, bowel sounds active  M/S:   wheelchair bound, strength and tone equal bilaterally, no calf pain, arthritic changes noted in hands  SKIN:  warm, dry, thin, fragile, no obvious rash, lesions, ulcerations or petechiae   NEURO:   Face is symmetric, examination of sensation by touch normal, follows and tracks, slow speech  PSYCH:  calm, cooperative      Lab/Diagnostic data:  Labs reviewed as per [facility] and/or Care Everywhere.      ASSESSMENT/PLAN:       Episode of altered cognition  Polypharmacy  Reason this hospitalization and previous one. IP neurology consulted; felt less likely MS flare. Strong suspicion for medication effect; PTA gabapentin and armodafinil discontinued at hospital discharge.  -follow up with neurology  -monitor mood and behaviors    Acute kidney " injury  Noted on hospital admission. Likely contributed to medication effect. DENNISE likely prerenal secondary to poor intake; hyaline casts noted. Ensure supplementation started in hospital. 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 Follows Dr. Redman from [facility]; MRI on 3/21 reportedly stable. In TCU, PRN baclofen added on 4/3 for reported afternoon spasms. Today, no concerns  -baclofen 20 mg qAM and baclofen 30 mg at bedtime; 20 mg daily as needed  -follow-up with neurology as recommended    Essential hypertension  //73 mmHg. 77-85 bpm.   -continue losartan 50 mg twice daily  -continue amlodipine 5 mg daily  -follow BP and adjust medications as needed    Right internal carotid artery aneurysm  Incidental finding of right internal carotid artery saccular aneurysm on 4/8; recommend outpatient follow-up.    Genitourinary syndrome of menopause   PTA prophylactic Macrobid discontinued at hospital discharge. Today, patient denies dysuria.   -continue estradiol 2 gram every Mon and Thursday  -monitor for worsening signs/symptoms     MARYLU (obstructive sleep apnea)  IP neurology recommended seeing sleep neurologist to evaluate for sleep-related etiology for head drop spells.  -PTA armodafinil 300 mg daily     Major depressive disorder, remission status unspecified, unspecified whether recurrent  -duloxetine 80 mg daily  -monitor mood and behaviors     Malignant neoplasm of upper-outer quadrant of left breast in female, estrogen receptor positive (H)  Previously on Lemtrada; transitioned Briumvi in Feb 2025; next infusion due in 5 months.   -follow-up with oncology     Other specified hypothyroidism  Last TSH 1.31 (3/26/25)  -levothyroxine 112 mcg daily  -trend TSH periodically     Slow transit constipation   -Start miralax 17 g  daily  -adjust bowel meds as needed    Takes dietary supplements   Clarified orders in TCU.  -calcium carbonate 500 mg daily while in TCU vs 600 mg due to no supply per pharmacy  -take cranberry supplement 200 mg daily while in TCU    Physical deconditioning  Secondary to diagnoses above and recent hospitalization. In TCU for rehabilitation.   -PT/OT eval and treat - adv per recommendations   -SW available for safe discharge planning ongoing        45 minutes spent on the date of this encounter doing chart review, review labs, discussion with nursing staff, patient visit, and documentation.       Electronically signed by:  Dr. Jovanna Nobles, DNP, APRN, AGNP-BC, PHN    This note was completed with the assistance of dictation software. Typos and word substitution-errors are expected and unintended.

## 2025-04-17 NOTE — PROGRESS NOTES
Clinic Care Coordination Contact  Care Coordination Transition Communication    Clinical Data: Patient was hospitalized at [facility] from 4/8/25 to 4/16/25 with diagnosis of Acute Encephalopathy, Toxic Metabolic due to Medication, now resolved DENNISE.     Assessment: Patient has transitioned to TCU/ARU for short term rehabilitation:    [facility]  Transition Communication:  Notified facility of Primary Care- Care Coordination support via Telephone. JUSTIN SNIGH left a voicemail for Geetha. ([phone]).    Plan: Care Coordinator will await notification from facility staff informing of patient's discharge plans/needs. Care Coordinator will review chart and outreach to facility staff every 4 weeks and as needed.     Mayra Brown [facility]  Social Work Care Coordinator  [facility]  [email or web address]  Office: [phone]  Gender pronouns: she/her

## 2025-04-17 NOTE — LETTER
4/17/2025      Marylee Woods  [address]  [facility] [address]        [facility] GERIATRICS    PRIMARY CARE PROVIDER AND CLINIC:  Carolina Pulliam MD, [address] [address] / [place] [address]***  Chief Complaint   Patient presents with    Hospital F/U      [facility] Record Number:  [id number]  Place of Service where encounter took place:  [facility] (SNF) [[id number]]    Marylee Woods  is a 69 year old  (1955), {fgsinitialoption:198702}.   HPI:    ***    CODE STATUS/ADVANCE DIRECTIVES DISCUSSION:  Prior  {CODE STATUS:432854}  ALLERGIES:   Allergies   Allergen Reactions    Bactrim [Sulfamethoxazole-Trimethoprim] Hallucination    Hydrochlorothiazide      Hyponatremia    Sulfamethizole     Amantadine      hallucinations    Budesonide     Budesonide-Formoterol Fumarate Rash      PAST MEDICAL HISTORY:   Past Medical History:   Diagnosis Date    Acute cystitis without hematuria     Acute pain of right knee     Atypical ductal hyperplasia of left breast 02/2022    Candida esophagitis - 2019 (H)     CKD (chronic kidney disease) stage 3, GFR 30-59 ml/min (H)     Community acquired pneumonia, unspecified laterality     Cough, unspecified type 04/05/2024    Depression     Epigastric pain     Former tobacco use     Fracture of femur, distal, left, closed (H)     Gastro-oesophageal reflux disease     Graves disease     History of fracture of fibula     History of tibial fracture     HSV (herpes simplex virus) infection     Hyperlipidemia with target LDL less than 130     Hyponatremia     L tib fx s/p IM nailing     Lung nodules     Currently managed with follow up imaging by [facility] result Team.       Multiple sclerosis (H)     Osteoporosis     Postablative hypothyroidism       PAST SURGICAL HISTORY:   has a past surgical history that includes hip surgery (2009); c/section, low transverse (1992); Laparoscopic cholecystectomy (6/24/2014); colonoscopy (2007);  Esophagoscopy, gastroscopy, duodenoscopy (EGD), combined (1/26/2017); orthopedic surgery (2009); Open reduction internal fixation rodding intramedullary tibia (4/14/2013); Esophagoscopy, gastroscopy, duodenoscopy (EGD), combined (N/A, 1/26/2017); Colonoscopy (N/A, 1/26/2017); Open reduction internal fixation femur distal (Left, 11/1/2018); Lumpectomy breast (Left, 3/31/2022); and Esophagoscopy, gastroscopy, duodenoscopy (EGD), combined (N/A, 4/20/2023).  FAMILY HISTORY: family history includes Cancer in her maternal grandfather; Heart Disease in her father, maternal grandmother, mother, and paternal grandfather.  SOCIAL HISTORY:   reports that she quit smoking about 3 years ago. Her smoking use included cigarettes. She has been exposed to tobacco smoke. She has never used smokeless tobacco. She reports current alcohol use. She reports that she does not use drugs.  Patient's living condition: {LIVES WITH (NURSING HOME):969050}    Post Discharge Medication Reconciliation Status:   MED REC REQUIRED{TIP  Click the link below to document or use med rec list, use list to pull in response :963969}  Post Medication Reconciliation Status: {MED REC LIST:933129}       Current Outpatient Medications   Medication Sig Dispense Refill    acetaminophen (TYLENOL) 500 MG tablet Take 1-2 tablets (500-1,000 mg) by mouth every 8 hours as needed for mild pain or fever (greater than 101 degrees)      acyclovir (ZOVIRAX) 400 MG tablet TAKE 1 TABLET BY MOUTH EVERY 12 HOURS 180 tablet 2    amLODIPine (NORVASC) 5 MG tablet Take 1 tablet by mouth once daily 90 tablet 2    baclofen (LIORESAL) 10 MG tablet Take 2 tablets (20 mg) in the morning and 3 tablets (30 mg) in the evening. May take one extra 20 mg dose in the afternoon as needed      Calcium Carbonate Antacid 600 MG CHEW Take 600 mg by mouth daily      CRANBERRY PO Take by mouth. Take 1 capsule once daily.      DULoxetine (CYMBALTA) 20 MG capsule TAKE 1 CAPSULE BY MOUTH ONCE DAILY ALONG   WITH  A  60  MG  TABLET  FOR  A  TOTAL  DOSE  OF  80  MG  DAILY 90 capsule 4    DULoxetine (CYMBALTA) 60 MG capsule Take 1 capsule by mouth once daily (Patient taking differently: Take 1 capsule by mouth once daily, along with 20 mg for total of 80 mg daily dose.) 90 capsule 4    estradiol (ESTRACE) 0.1 MG/GM vaginal cream Place 2 g vaginally twice a week. (Patient taking differently: Place 2 g vaginally daily. For 2 weeks.) 42.5 g 4    famotidine (PEPCID) 40 MG tablet TAKE 1 TABLET BY MOUTH AT BEDTIME 90 tablet 2    latanoprost (XALATAN) 0.005 % ophthalmic solution INSTILL 1 DROP INTO BOTH EYES EVERY DAY AS DIRECTED PT WILL NEED TO BE SEEN FOR FUTURE REFILLS      levothyroxine (SYNTHROID/LEVOTHROID) 112 MCG tablet Take 1 tablet (112 mcg) by mouth daily 90 tablet 3    losartan (COZAAR) 50 MG tablet Take 1 tablet (50 mg) by mouth 2 times daily 180 tablet 4    magnesium oxide (MAG-OX) 400 MG tablet Take 400 mg by mouth daily.      Multiple Vitamins-Minerals (MULTIPLE VITAMINS/WOMENS PO) Take by mouth daily      ondansetron (ZOFRAN ODT) 4 MG ODT tab Take 1 tablet (4 mg) by mouth every 6 hours as needed for nausea or vomiting. 12 tablet 0     No current facility-administered medications for this visit.       ROS:  {ROS FGS:973204}    Vitals:  LMP 01/31/2004 (Approximate)   Exam:  {Nursing home physical exam :819069}    Lab/Diagnostic data:  {fgslab:754712}    ASSESSMENT/PLAN:    {FGS DX2:936387}    Orders:  {fgsorders:799252}  ***    Electronically signed by:  Jennifer Varma ***                     Sincerely,        Jovanna Nobles, SWAPNIL CNP    Electronically signed   4/20/2023).  FAMILY HISTORY: family history includes Cancer in her maternal grandfather; Heart Disease in her father, maternal grandmother, mother, and paternal grandfather.  SOCIAL HISTORY:   reports that she quit smoking about 3 years ago. Her smoking use included cigarettes. She has been exposed to tobacco smoke. She has never used smokeless tobacco. She reports current alcohol use. She reports that she does not use drugs.  Patient's living condition: lives with spouse    Post Discharge Medication Reconciliation Status:   MED REC REQUIRED  Post Medication Reconciliation Status: discharge medications reconciled and changed, per note/orders       Current Outpatient Medications   Medication Sig Dispense Refill     acetaminophen (TYLENOL) 500 MG tablet Take 1-2 tablets (500-1,000 mg) by mouth every 8 hours as needed for mild pain or fever (greater than 101 degrees)       acyclovir (ZOVIRAX) 400 MG tablet TAKE 1 TABLET BY MOUTH EVERY 12 HOURS 180 tablet 2     amLODIPine (NORVASC) 5 MG tablet Take 1 tablet by mouth once daily 90 tablet 2     baclofen (LIORESAL) 10 MG tablet Take 2 tablets (20 mg) in the morning and 3 tablets (30 mg) in the evening. May take one extra 20 mg dose in the afternoon as needed       Calcium Carbonate Antacid 600 MG CHEW Take 600 mg by mouth daily       CRANBERRY PO Take by mouth. Take 1 capsule once daily.       DULoxetine (CYMBALTA) 20 MG capsule TAKE 1 CAPSULE BY MOUTH ONCE DAILY ALONG  WITH  A  60  MG  TABLET  FOR  A  TOTAL  DOSE  OF  80  MG  DAILY 90 capsule 4     DULoxetine (CYMBALTA) 60 MG capsule Take 1 capsule by mouth once daily (Patient taking differently: Take 1 capsule by mouth once daily, along with 20 mg for total of 80 mg daily dose.) 90 capsule 4     estradiol (ESTRACE) 0.1 MG/GM vaginal cream Place 2 g vaginally twice a week. (Patient taking differently: Place 2 g vaginally daily. For 2 weeks.) 42.5 g 4     famotidine (PEPCID) 40 MG tablet TAKE 1 TABLET BY MOUTH AT BEDTIME  "90 tablet 2     latanoprost (XALATAN) 0.005 % ophthalmic solution INSTILL 1 DROP INTO BOTH EYES EVERY DAY AS DIRECTED PT WILL NEED TO BE SEEN FOR FUTURE REFILLS       levothyroxine (SYNTHROID/LEVOTHROID) 112 MCG tablet Take 1 tablet (112 mcg) by mouth daily 90 tablet 3     losartan (COZAAR) 50 MG tablet Take 1 tablet (50 mg) by mouth 2 times daily 180 tablet 4     magnesium oxide (MAG-OX) 400 MG tablet Take 400 mg by mouth daily.       Multiple Vitamins-Minerals (MULTIPLE VITAMINS/WOMENS PO) Take by mouth daily       ondansetron (ZOFRAN ODT) 4 MG ODT tab Take 1 tablet (4 mg) by mouth every 6 hours as needed for nausea or vomiting. 12 tablet 0     No current facility-administered medications for this visit.       ROS:  4 point ROS including Respiratory, CV, GI and , other than that noted in the HPI,  is negative    Vitals:  /55   Pulse 65   Temp 98.2  F (36.8  C)   Resp 18   Ht 1.727 m (5' 8\")   Wt 87.9 kg (193 lb 12.8 oz)   LMP 01/31/2004 (Approximate)   SpO2 94%   BMI 29.47 kg/m    Exam:  GENERAL APPEARANCE:  Alert,in no distress  HEENT:  atraumatic, EOM intact, moist mucus membranes  RESP:  non-labored breathing, lungs clear on auscultation, no respiratory distress, no cough  CV:  Rate regular, S1 S2 noted, 1+ BLE edema  ABDOMEN:  soft, non-distended, non-tender, bowel sounds active  M/S:   wheelchair bound, strength and tone equal bilaterally, no calf pain, arthritic changes noted in hands  SKIN:  warm, dry, thin, fragile, no obvious rash, lesions, ulcerations or petechiae   NEURO:   Face is symmetric, examination of sensation by touch normal, follows and tracks, slow speech  PSYCH:  calm, cooperative      Lab/Diagnostic data:  Labs reviewed as per [facility] and/or Care Everywhere.      ASSESSMENT/PLAN:       Episode of altered cognition  Polypharmacy  Reason this hospitalization and previous one. IP neurology consulted; felt less likely MS flare. Strong suspicion for medication effect; PTA gabapentin " and armodafinil discontinued at hospital discharge.  -follow up with neurology  -monitor mood and behaviors    Acute kidney injury  Noted on hospital admission. Likely contributed to medication effect. DENNISE likely prerenal secondary to poor intake; hyaline casts noted. Ensure supplementation started in hospital. 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 Follows Dr. Redman from [facility]; MRI on 3/21 reportedly stable. In TCU, PRN baclofen added on 4/3 for reported afternoon spasms. Today, no concerns  -baclofen 20 mg qAM and baclofen 30 mg at bedtime; 20 mg daily as needed  -follow-up with neurology as recommended    Essential hypertension  //73 mmHg. 77-85 bpm.   -continue losartan 50 mg twice daily  -continue amlodipine 5 mg daily  -follow BP and adjust medications as needed    Right internal carotid artery aneurysm  Incidental finding of right internal carotid artery saccular aneurysm on 4/8; recommend outpatient follow-up.    Genitourinary syndrome of menopause   PTA prophylactic Macrobid discontinued at hospital discharge. Today, patient denies dysuria.   -continue estradiol 2 gram every Mon and Thursday  -monitor for worsening signs/symptoms     MARYLU (obstructive sleep apnea)  IP neurology recommended seeing sleep neurologist to evaluate for sleep-related etiology for head drop spells.  -PTA armodafinil 300 mg daily     Major depressive disorder, remission status unspecified, unspecified whether recurrent  -duloxetine 80 mg daily  -monitor mood and behaviors     Malignant neoplasm of upper-outer quadrant of left breast in female, estrogen receptor positive (H)  Previously on Lemtrada; transitioned Briumvi in Feb 2025; next infusion due in 5 months.   -follow-up with oncology     Other specified hypothyroidism  Last TSH  1.31 (3/26/25)  -levothyroxine 112 mcg daily  -trend TSH periodically     Slow transit constipation   -Start miralax 17 g daily  -adjust bowel meds as needed    Takes dietary supplements   Clarified orders in TCU.  -calcium carbonate 500 mg daily while in TCU vs 600 mg due to no supply per pharmacy  -take cranberry supplement 200 mg daily while in TCU    Physical deconditioning  Secondary to diagnoses above and recent hospitalization. In TCU for rehabilitation.   -PT/OT eval and treat - adv per recommendations   -SW available for safe discharge planning ongoing        45 minutes spent on the date of this encounter doing chart review, review labs, discussion with nursing staff, patient visit, and documentation.       Electronically signed by:  Dr. Jovanna Nobles, DNP, APRN, AGNP-BC, PHN    This note was completed with the assistance of dictation software. Typos and word substitution-errors are expected and unintended.                    Sincerely,        SWAPNIL Reddy CNP    Electronically signed

## 2025-04-21 ENCOUNTER — TRANSITIONAL CARE UNIT VISIT (OUTPATIENT)
Dept: GERIATRICS | Facility: CLINIC | Age: 70
End: 2025-04-21
Payer: MEDICARE

## 2025-04-21 ENCOUNTER — LAB REQUISITION (OUTPATIENT)
Dept: LAB | Facility: CLINIC | Age: 70
End: 2025-04-21
Payer: MEDICARE

## 2025-04-21 VITALS
SYSTOLIC BLOOD PRESSURE: 115 MMHG | WEIGHT: 142.6 LBS | TEMPERATURE: 97.6 F | RESPIRATION RATE: 16 BRPM | HEART RATE: 86 BPM | OXYGEN SATURATION: 94 % | DIASTOLIC BLOOD PRESSURE: 72 MMHG | HEIGHT: 68 IN | BODY MASS INDEX: 21.61 KG/M2

## 2025-04-21 DIAGNOSIS — G35 MS (MULTIPLE SCLEROSIS) (H): ICD-10-CM

## 2025-04-21 DIAGNOSIS — N18.32 STAGE 3B CHRONIC KIDNEY DISEASE (H): ICD-10-CM

## 2025-04-21 DIAGNOSIS — D64.9 ANEMIA, UNSPECIFIED: ICD-10-CM

## 2025-04-21 DIAGNOSIS — Z79.899 POLYPHARMACY: ICD-10-CM

## 2025-04-21 DIAGNOSIS — G35 SPASTIC PARAPLEGIA SECONDARY TO MULTIPLE SCLEROSIS (H): ICD-10-CM

## 2025-04-21 DIAGNOSIS — G47.33 OSA (OBSTRUCTIVE SLEEP APNEA): ICD-10-CM

## 2025-04-21 DIAGNOSIS — G82.20 SPASTIC PARAPLEGIA SECONDARY TO MULTIPLE SCLEROSIS (H): ICD-10-CM

## 2025-04-21 DIAGNOSIS — G92.8 TOXIC METABOLIC ENCEPHALOPATHY: ICD-10-CM

## 2025-04-21 DIAGNOSIS — D72.819 LEUKOPENIA, UNSPECIFIED TYPE: ICD-10-CM

## 2025-04-21 DIAGNOSIS — D64.9 NORMOCYTIC ANEMIA: ICD-10-CM

## 2025-04-21 DIAGNOSIS — N95.8 GENITOURINARY SYNDROME OF MENOPAUSE: ICD-10-CM

## 2025-04-21 DIAGNOSIS — G47.411 NARCOLEPSY AND CATAPLEXY: ICD-10-CM

## 2025-04-21 DIAGNOSIS — I67.1 RIGHT INTERNAL CAROTID ARTERY ANEURYSM: ICD-10-CM

## 2025-04-21 DIAGNOSIS — R41.89 EPISODE OF ALTERED COGNITION: Primary | ICD-10-CM

## 2025-04-21 NOTE — LETTER
4/21/2025      Marylee Woods  [address]  [facility] [address]        [facility] GERIATRICS    PRIMARY CARE PROVIDER AND CLINIC:  Carolina Pulliam MD, [address] [address] / [place] [address]  Chief Complaint   Patient presents with     Hospital F/U     Readmission      [place] Medical Record Number:  [id number]  Place of Service where encounter took place:  [facility] ([facility]) [[id number]]    Marylee Woods  is a 69 year old  (1955), with pmhx including multiple sclerosis with spasticity, MARYLU/CSA c/b narcolepsy, GUSM, recently admitted to this facility 3/29-4/8/25, re-admitted to the above facility from  [facility]. Hospital stay 4/8/25 - 4/16/25. .   HPI:      Hospital Course  Presented to the ED from this TCU due to decreased level of consciousness.  She was supposed to discharge from TCU, however found to to be essentially unresponsive.  Recommended to be evaluated in the emergency department.  Notably, per EMS report, additionally concern for right-sided facial droop in addition to being nonverbal and decreased level of consciousness.  Code stroke called.  CT head without acute hemorrhage or other concerning findings.  CTA of head and neck without any significant occlusions.  BMP with increased creatinine to 1.03 from baseline of approximately 0.7.  Else normal.  CBC without leukocytosis, mild anemia stable from prior.  UA without specific findings, though concentrated.    Neurology consulted.  Noted to have multiple of these types of events over the past few months.  Evaluated further with video EEG.  Video EEG without evidence of seizure. Less consistent with MS flare and also she wished to defer MRI.  Concern for cystitis and started on Zosyn.  Seen by PMNR who recommended decreased dose of gabapentin, continue baclofen. Armodafinil also held  Infectious disease was consulted and recommended discontinuing antibacterials given  low likelihood of true infection. She did develop diarrhea, C. difficile testing negative.    Cognitive status improved during her course.  Gabapentin ultimately discontinued.  Armodafinil also discontinued until she is able to discuss further with sleep physician per her preference.  Given urinary findings and resistance concerns, Macrobid discontinued as well.    Today  Seen previously by my colleague, bowel regimen adjusted. Else doing well.     Seen in her room today.  Feeling better.  No concerns.  She has not noticed any worsening pain since discontinuing gabapentin.  She does continue to get spasms in her feet and legs at night primarily.  Continues on baclofen is working well.  Again with does not wish to resume armodafinil until follow-up with her sleep physician.  She has been sleeping well otherwise.  She is still feeling constipated but is not concerned given current bowel regimen.  Says she had diarrhea in the hospital due to being given senna which she is very sensitive to.    She again wishes to return home.  She does have therapy group she has already been working with.  Discussed may be able to discharge later this week pending her course.    CODE STATUS/ADVANCE DIRECTIVES DISCUSSION: CPR/Full code   ALLERGIES:   Allergies   Allergen Reactions     Bactrim [Sulfamethoxazole-Trimethoprim] Hallucination     Hydrochlorothiazide      Hyponatremia     Sulfamethizole      Amantadine      hallucinations     Budesonide      Budesonide-Formoterol Fumarate Rash      PAST MEDICAL HISTORY:   Past Medical History:   Diagnosis Date     Acute cystitis without hematuria      Acute pain of right knee      Atypical ductal hyperplasia of left breast 02/2022     Candida esophagitis - 2019 (H)      CKD (chronic kidney disease) stage 3, GFR 30-59 ml/min (H)      Community acquired pneumonia, unspecified laterality      Cough, unspecified type 04/05/2024     Depression      Epigastric pain      Former tobacco use       Fracture of femur, distal, left, closed (H)      Gastro-oesophageal reflux disease      Graves disease      History of fracture of fibula      History of tibial fracture      HSV (herpes simplex virus) infection      Hyperlipidemia with target LDL less than 130      Hyponatremia      L tib fx s/p IM nailing      Lung nodules     Currently managed with follow up imaging by [facility] result Team.        Multiple sclerosis (H)      Osteoporosis      Postablative hypothyroidism       PAST SURGICAL HISTORY:   has a past surgical history that includes hip surgery (2009); c/section, low transverse (1992); Laparoscopic cholecystectomy (6/24/2014); colonoscopy (2007); Esophagoscopy, gastroscopy, duodenoscopy (EGD), combined (1/26/2017); orthopedic surgery (2009); Open reduction internal fixation rodding intramedullary tibia (4/14/2013); Esophagoscopy, gastroscopy, duodenoscopy (EGD), combined (N/A, 1/26/2017); Colonoscopy (N/A, 1/26/2017); Open reduction internal fixation femur distal (Left, 11/1/2018); Lumpectomy breast (Left, 3/31/2022); and Esophagoscopy, gastroscopy, duodenoscopy (EGD), combined (N/A, 4/20/2023).  FAMILY HISTORY: family history includes Cancer in her maternal grandfather; Heart Disease in her father, maternal grandmother, mother, and paternal grandfather.  SOCIAL HISTORY:   reports that she quit smoking about 3 years ago. Her smoking use included cigarettes. She has been exposed to tobacco smoke. She has never used smokeless tobacco. She reports current alcohol use. She reports that she does not use drugs.  Patient's living condition: lives with spouse    Post Discharge Medication Reconciliation Status:   MED REC REQUIRED  Post Medication Reconciliation Status: medication reconcilation previously completed during another office visit       Current Outpatient Medications   Medication Sig Dispense Refill     acetaminophen (TYLENOL) 500 MG tablet Take 1-2 tablets (500-1,000 mg) by mouth every  "8 hours as needed for mild pain or fever (greater than 101 degrees)       acyclovir (ZOVIRAX) 400 MG tablet TAKE 1 TABLET BY MOUTH EVERY 12 HOURS 180 tablet 2     amLODIPine (NORVASC) 5 MG tablet Take 1 tablet by mouth once daily 90 tablet 2     baclofen (LIORESAL) 10 MG tablet Take 2 tablets (20 mg) in the morning and 3 tablets (30 mg) in the evening. May take one extra 20 mg dose in the afternoon as needed       Calcium Carbonate Antacid 600 MG CHEW Take 600 mg by mouth daily       CRANBERRY PO Take by mouth. Take 1 capsule once daily.       DULoxetine (CYMBALTA) 20 MG capsule TAKE 1 CAPSULE BY MOUTH ONCE DAILY ALONG  WITH  A  60  MG  TABLET  FOR  A  TOTAL  DOSE  OF  80  MG  DAILY 90 capsule 4     DULoxetine (CYMBALTA) 60 MG capsule Take 1 capsule by mouth once daily (Patient taking differently: Take 1 capsule by mouth once daily, along with 20 mg for total of 80 mg daily dose.) 90 capsule 4     estradiol (ESTRACE) 0.1 MG/GM vaginal cream Place 2 g vaginally twice a week. (Patient taking differently: Place 2 g vaginally daily. For 2 weeks.) 42.5 g 4     famotidine (PEPCID) 40 MG tablet TAKE 1 TABLET BY MOUTH AT BEDTIME 90 tablet 2     latanoprost (XALATAN) 0.005 % ophthalmic solution INSTILL 1 DROP INTO BOTH EYES EVERY DAY AS DIRECTED PT WILL NEED TO BE SEEN FOR FUTURE REFILLS       levothyroxine (SYNTHROID/LEVOTHROID) 112 MCG tablet Take 1 tablet (112 mcg) by mouth daily 90 tablet 3     losartan (COZAAR) 50 MG tablet Take 1 tablet (50 mg) by mouth 2 times daily 180 tablet 4     magnesium oxide (MAG-OX) 400 MG tablet Take 400 mg by mouth daily.       Multiple Vitamins-Minerals (MULTIPLE VITAMINS/WOMENS PO) Take by mouth daily       ondansetron (ZOFRAN ODT) 4 MG ODT tab Take 1 tablet (4 mg) by mouth every 6 hours as needed for nausea or vomiting. 12 tablet 0     No current facility-administered medications for this visit.     Vitals:  /72   Pulse 86   Temp 97.6  F (36.4  C)   Resp 16   Ht 1.727 m (5' 8\")  "  Wt 64.7 kg (142 lb 9.6 oz)   LMP 01/31/2004 (Approximate)   SpO2 94%   BMI 21.68 kg/m    Exam:    GENERAL APPEARANCE: Laying in bed comfortably, NAD  HENT:  NCAT, mildly [place], good dentition  EYES:  Conjunctiva clear, anicteric, EOMI, PERRL  PULM  Normal WOB on RA, lungs CTAB, no wheezes or crackles  CV:  RRR, S1/S2 normal, no murmurs; no LE edema  ABDOMEN: Abdomen soft, not tender, not distended, BS normal and active throughout   NEURO: Alert, answering questions appropriately, normal thought processes; CN II-XII grossly intact  PSYCH: Mood normal with congruent affect    Lab/Diagnostic data:    Recent labs and imaging in [facility] reviewed by me today.      ASSESSMENT/PLAN:    (R41.89) Episode of altered cognition  (primary encounter diagnosis)  (G92.8) Toxic metabolic encephalopathy  Comment: Readmitted for encephalopathy, similar episode to her original admission to this facility.  Likely multifactorial related to polypharmacy, MARYLU and narcolepsy, possibly related to MS.  Significant improvement after discontinuing gabapentin.    (Z79.899) Polypharmacy  Comment: Related to multiple conditions.  Particularly in context of likely overestimated renal function as shown by Cystatin C.  Doing well without gabapentin.  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    (G47.33) MARYLU (obstructive sleep apnea)  Comment: Possibly contributing to episodes of altered cognition.  She does have an AVS she uses regularly.  Agree with close follow-up with her sleep physician.    (N18.32) Stage 3b chronic kidney disease (H)  Comment: Cystatin C showing estimated GFR of 40.  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  Otherwise relatively stable.  Spasticity reasonably controlled with current dose of baclofen.    (N95.8) Genitourinary syndrome of menopause  Comment: Contributing to episodes of dysuria.  She is likely colonized, no findings to suggest bacterial cystitis.  Previously increased her dose of Estrace given significant findings of atrophy and irritation.    (D64.9) Normocytic anemia  Comment: Etiology unclear, likely anemia of CKD as well as dilutional component.    (D72.819) Leukopenia, unspecified type  Comment: Etiology unclear.  Possibly last marrow suppression related to acute conditions.  Plan:   - CBC    (I67.1) Right internal carotid artery aneurysm  Comment: Noted on head and neck CTA as part of overall evaluation.  Saccular aneurysm measuring 3 x 3 mm.  Plan  - Repeat imaging recommended in 6 months (approximately October)    Orders:  [x] CBC w/diff    Electronically signed by:    Benjamin Rosenstein, MD, MA  [facility]     This note was completed with the assistance of dictation software. Typos and word substitution-errors are expected and unintended.      54 MINUTES SPENT BY ME on the date of service doing chart review, history, exam, documentation & further activities per the note.        Sincerely,        Benjamin Rosenstein, MD    Electronically signed

## 2025-04-21 NOTE — PROGRESS NOTES
[facility] GERIATRICS    PRIMARY CARE PROVIDER AND CLINIC:  Carolina Pulliam MD, [address] / [place] [address]  Chief Complaint   Patient presents with    Hospital F/U     Readmission      [place] Medical Record Number:  [id number]  Place of Service where encounter took place:  [facility] ([facility]) [[id number]]    Marylee Woods  is a 69 year old  (1955), with pmhx including multiple sclerosis with spasticity, MARYLU/CSA c/b narcolepsy, GUSM, recently admitted to this facility 3/29-4/8/25, re-admitted to the above facility from  [facility]. Hospital stay 4/8/25 - 4/16/25. .   HPI:      Hospital Course  Presented to the ED from this TCU due to decreased level of consciousness.  She was supposed to discharge from TCU, however found to to be essentially unresponsive.  Recommended to be evaluated in the emergency department.  Notably, per EMS report, additionally concern for right-sided facial droop in addition to being nonverbal and decreased level of consciousness.  Code stroke called.  CT head without acute hemorrhage or other concerning findings.  CTA of head and neck without any significant occlusions.  BMP with increased creatinine to 1.03 from baseline of approximately 0.7.  Else normal.  CBC without leukocytosis, mild anemia stable from prior.  UA without specific findings, though concentrated.    Neurology consulted.  Noted to have multiple of these types of events over the past few months.  Evaluated further with video EEG.  Video EEG without evidence of seizure. Less consistent with MS flare and also she wished to defer MRI.  Concern for cystitis and started on Zosyn.  Seen by PMNR who recommended decreased dose of gabapentin, continue baclofen. Armodafinil also held  Infectious disease was consulted and recommended discontinuing antibacterials given low likelihood of true infection. She did develop diarrhea, C. difficile testing  negative.    Cognitive status improved during her course.  Gabapentin ultimately discontinued.  Armodafinil also discontinued until she is able to discuss further with sleep physician per her preference.  Given urinary findings and resistance concerns, Macrobid discontinued as well.    Today  Seen previously by my colleague, bowel regimen adjusted. Else doing well.     Seen in her room today.  Feeling better.  No concerns.  She has not noticed any worsening pain since discontinuing gabapentin.  She does continue to get spasms in her feet and legs at night primarily.  Continues on baclofen is working well.  Again with does not wish to resume armodafinil until follow-up with her sleep physician.  She has been sleeping well otherwise.  She is still feeling constipated but is not concerned given current bowel regimen.  Says she had diarrhea in the hospital due to being given senna which she is very sensitive to.    She again wishes to return home.  She does have therapy group she has already been working with.  Discussed may be able to discharge later this week pending her course.    CODE STATUS/ADVANCE DIRECTIVES DISCUSSION: CPR/Full code   ALLERGIES:   Allergies   Allergen Reactions    Bactrim [Sulfamethoxazole-Trimethoprim] Hallucination    Hydrochlorothiazide      Hyponatremia    Sulfamethizole     Amantadine      hallucinations    Budesonide     Budesonide-Formoterol Fumarate Rash      PAST MEDICAL HISTORY:   Past Medical History:   Diagnosis Date    Acute cystitis without hematuria     Acute pain of right knee     Atypical ductal hyperplasia of left breast 02/2022    Candida esophagitis - 2019 (H)     CKD (chronic kidney disease) stage 3, GFR 30-59 ml/min (H)     Community acquired pneumonia, unspecified laterality     Cough, unspecified type 04/05/2024    Depression     Epigastric pain     Former tobacco use     Fracture of femur, distal, left, closed (H)     Gastro-oesophageal reflux disease     Graves disease      History of fracture of fibula     History of tibial fracture     HSV (herpes simplex virus) infection     Hyperlipidemia with target LDL less than 130     Hyponatremia     L tib fx s/p IM nailing     Lung nodules     Currently managed with follow up imaging by [facility] result Team.       Multiple sclerosis (H)     Osteoporosis     Postablative hypothyroidism       PAST SURGICAL HISTORY:   has a past surgical history that includes hip surgery (2009); c/section, low transverse (1992); Laparoscopic cholecystectomy (6/24/2014); colonoscopy (2007); Esophagoscopy, gastroscopy, duodenoscopy (EGD), combined (1/26/2017); orthopedic surgery (2009); Open reduction internal fixation rodding intramedullary tibia (4/14/2013); Esophagoscopy, gastroscopy, duodenoscopy (EGD), combined (N/A, 1/26/2017); Colonoscopy (N/A, 1/26/2017); Open reduction internal fixation femur distal (Left, 11/1/2018); Lumpectomy breast (Left, 3/31/2022); and Esophagoscopy, gastroscopy, duodenoscopy (EGD), combined (N/A, 4/20/2023).  FAMILY HISTORY: family history includes Cancer in her maternal grandfather; Heart Disease in her father, maternal grandmother, mother, and paternal grandfather.  SOCIAL HISTORY:   reports that she quit smoking about 3 years ago. Her smoking use included cigarettes. She has been exposed to tobacco smoke. She has never used smokeless tobacco. She reports current alcohol use. She reports that she does not use drugs.  Patient's living condition: lives with spouse    Post Discharge Medication Reconciliation Status:   MED REC REQUIRED  Post Medication Reconciliation Status: medication reconcilation previously completed during another office visit       Current Outpatient Medications   Medication Sig Dispense Refill    acetaminophen (TYLENOL) 500 MG tablet Take 1-2 tablets (500-1,000 mg) by mouth every 8 hours as needed for mild pain or fever (greater than 101 degrees)      acyclovir (ZOVIRAX) 400 MG tablet TAKE 1  "TABLET BY MOUTH EVERY 12 HOURS 180 tablet 2    amLODIPine (NORVASC) 5 MG tablet Take 1 tablet by mouth once daily 90 tablet 2    baclofen (LIORESAL) 10 MG tablet Take 2 tablets (20 mg) in the morning and 3 tablets (30 mg) in the evening. May take one extra 20 mg dose in the afternoon as needed      Calcium Carbonate Antacid 600 MG CHEW Take 600 mg by mouth daily      CRANBERRY PO Take by mouth. Take 1 capsule once daily.      DULoxetine (CYMBALTA) 20 MG capsule TAKE 1 CAPSULE BY MOUTH ONCE DAILY ALONG  WITH  A  60  MG  TABLET  FOR  A  TOTAL  DOSE  OF  80  MG  DAILY 90 capsule 4    DULoxetine (CYMBALTA) 60 MG capsule Take 1 capsule by mouth once daily (Patient taking differently: Take 1 capsule by mouth once daily, along with 20 mg for total of 80 mg daily dose.) 90 capsule 4    estradiol (ESTRACE) 0.1 MG/GM vaginal cream Place 2 g vaginally twice a week. (Patient taking differently: Place 2 g vaginally daily. For 2 weeks.) 42.5 g 4    famotidine (PEPCID) 40 MG tablet TAKE 1 TABLET BY MOUTH AT BEDTIME 90 tablet 2    latanoprost (XALATAN) 0.005 % ophthalmic solution INSTILL 1 DROP INTO BOTH EYES EVERY DAY AS DIRECTED PT WILL NEED TO BE SEEN FOR FUTURE REFILLS      levothyroxine (SYNTHROID/LEVOTHROID) 112 MCG tablet Take 1 tablet (112 mcg) by mouth daily 90 tablet 3    losartan (COZAAR) 50 MG tablet Take 1 tablet (50 mg) by mouth 2 times daily 180 tablet 4    magnesium oxide (MAG-OX) 400 MG tablet Take 400 mg by mouth daily.      Multiple Vitamins-Minerals (MULTIPLE VITAMINS/WOMENS PO) Take by mouth daily      ondansetron (ZOFRAN ODT) 4 MG ODT tab Take 1 tablet (4 mg) by mouth every 6 hours as needed for nausea or vomiting. 12 tablet 0     No current facility-administered medications for this visit.     Vitals:  /72   Pulse 86   Temp 97.6  F (36.4  C)   Resp 16   Ht 1.727 m (5' 8\")   Wt 64.7 kg (142 lb 9.6 oz)   LMP 01/31/2004 (Approximate)   SpO2 94%   BMI 21.68 kg/m    Exam:    GENERAL APPEARANCE: " Laying in bed comfortably, NAD  HENT:  NCAT, mildly [place], good dentition  EYES:  Conjunctiva clear, anicteric, EOMI, PERRL  PULM  Normal WOB on RA, lungs CTAB, no wheezes or crackles  CV:  RRR, S1/S2 normal, no murmurs; no LE edema  ABDOMEN: Abdomen soft, not tender, not distended, BS normal and active throughout   NEURO: Alert, answering questions appropriately, normal thought processes; CN II-XII grossly intact  PSYCH: Mood normal with congruent affect    Lab/Diagnostic data:    Recent labs and imaging in [facility] reviewed by me today.      ASSESSMENT/PLAN:    (R41.89) Episode of altered cognition  (primary encounter diagnosis)  (G92.8) Toxic metabolic encephalopathy  Comment: Readmitted for encephalopathy, similar episode to her original admission to this facility.  Likely multifactorial related to polypharmacy, MARYLU and narcolepsy, possibly related to MS.  Significant improvement after discontinuing gabapentin.    (Z79.899) Polypharmacy  Comment: Related to multiple conditions.  Particularly in context of likely overestimated renal function as shown by Cystatin C.  Doing well without gabapentin.  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    (G47.33) MARYLU (obstructive sleep apnea)  Comment: Possibly contributing to episodes of altered cognition.  She does have an AVS she uses regularly.  Agree with close follow-up with her sleep physician.    (N18.32) Stage 3b chronic kidney disease (H)  Comment: Cystatin C showing estimated GFR of 40.  Likely more accurate given significantly decreased mobility related to MS and likely associated sarcopenia    (G35) MS (multiple sclerosis) (H)  (G82.20,  G35) Spastic paraplegia secondary to multiple sclerosis (H)  Comment: Episodes not suggestive of MS flare.  Otherwise relatively stable.   Spasticity reasonably controlled with current dose of baclofen.    (N95.8) Genitourinary syndrome of menopause  Comment: Contributing to episodes of dysuria.  She is likely colonized, no findings to suggest bacterial cystitis.  Previously increased her dose of Estrace given significant findings of atrophy and irritation.    (D64.9) Normocytic anemia  Comment: Etiology unclear, likely anemia of CKD as well as dilutional component.    (D72.819) Leukopenia, unspecified type  Comment: Etiology unclear.  Possibly last marrow suppression related to acute conditions.  Plan:   - CBC    (I67.1) Right internal carotid artery aneurysm  Comment: Noted on head and neck CTA as part of overall evaluation.  Saccular aneurysm measuring 3 x 3 mm.  Plan  - Repeat imaging recommended in 6 months (approximately October)    Orders:  [x] CBC w/diff    Electronically signed by:    Benjamin Rosenstein, MD, MA  [facility]     This note was completed with the assistance of dictation software. Typos and word substitution-errors are expected and unintended.      54 MINUTES SPENT BY ME on the date of service doing chart review, history, exam, documentation & further activities per the note.

## 2025-04-22 ENCOUNTER — TELEPHONE (OUTPATIENT)
Dept: GERIATRICS | Facility: CLINIC | Age: 70
End: 2025-04-22
Payer: MEDICARE

## 2025-04-22 RX ORDER — METHOCARBAMOL 500 MG/1
250 TABLET, FILM COATED ORAL DAILY PRN
COMMUNITY

## 2025-04-22 NOTE — TELEPHONE ENCOUNTER
"[facility] Geriatrics Triage Call    Provider: SWAPNIL Enamorado DNP  Facility: [facility]  Facility Type:  TCU    Caller: Rosey  Call Back Number: [phone]    Allergies:    Allergies   Allergen Reactions    Bactrim [Sulfamethoxazole-Trimethoprim] Hallucination    Hydrochlorothiazide      Hyponatremia    Sulfamethizole     Amantadine      hallucinations    Budesonide     Budesonide-Formoterol Fumarate Rash        SBAR:     S-(situation): Nurse called to report that patient is crying in pain due to muscle spasms she is having in her legs.      B-(background): MS with spasticity     A-(assessment): Patient states the muscle spasms started around 1 am.  PRN Baclofen given at that time.  Patient receives scheduled Baclofen 20 mg in AM, 30 mg in PM, and has a daily PRN dose of 20 mg.  Nurse this morning gave PRN Tylenol since Baclofen dose was already given.  Patient is not getting any relief and is crying.  Patient states it feels like her muscles are \"bruising\".  Patient recently hospitalized for acute encephalopathy.  Hospital discontinued some medications due to the possible cause of this.  One of those medications was Gabapentin which patient took for her muscle spasms.        R-(recommendations): Please advise       Telephone encounter sent to:  SWAPNIL Enamorado DNP    Please send response/orders to \"Geriatrics Nurse Pool\"    Opal Becker RN      "

## 2025-04-23 ENCOUNTER — LAB REQUISITION (OUTPATIENT)
Dept: LAB | Facility: CLINIC | Age: 70
End: 2025-04-23
Payer: MEDICARE

## 2025-04-23 ENCOUNTER — TELEPHONE (OUTPATIENT)
Dept: GERIATRICS | Facility: CLINIC | Age: 70
End: 2025-04-23

## 2025-04-23 DIAGNOSIS — D64.9 ANEMIA, UNSPECIFIED: ICD-10-CM

## 2025-04-23 LAB
BASOPHILS # BLD AUTO: 0.1 10E3/UL (ref 0–0.2)
BASOPHILS NFR BLD AUTO: 1 %
EOSINOPHIL # BLD AUTO: 0.3 10E3/UL (ref 0–0.7)
EOSINOPHIL NFR BLD AUTO: 5 %
ERYTHROCYTE [DISTWIDTH] IN BLOOD BY AUTOMATED COUNT: 15.4 % (ref 10–15)
HCT VFR BLD AUTO: 28.4 % (ref 35–47)
HGB BLD-MCNC: 8.9 G/DL (ref 11.7–15.7)
IMM GRANULOCYTES # BLD: 0 10E3/UL
IMM GRANULOCYTES NFR BLD: 0 %
LYMPHOCYTES # BLD AUTO: 1 10E3/UL (ref 0.8–5.3)
LYMPHOCYTES NFR BLD AUTO: 20 %
MCH RBC QN AUTO: 29.4 PG (ref 26.5–33)
MCHC RBC AUTO-ENTMCNC: 31.3 G/DL (ref 31.5–36.5)
MCV RBC AUTO: 94 FL (ref 78–100)
MONOCYTES # BLD AUTO: 0.4 10E3/UL (ref 0–1.3)
MONOCYTES NFR BLD AUTO: 9 %
NEUTROPHILS # BLD AUTO: 3.1 10E3/UL (ref 1.6–8.3)
NEUTROPHILS NFR BLD AUTO: 64 %
NRBC # BLD AUTO: 0 10E3/UL
NRBC BLD AUTO-RTO: 0 /100
PLATELET # BLD AUTO: 217 10E3/UL (ref 150–450)
RBC # BLD AUTO: 3.03 10E6/UL (ref 3.8–5.2)
WBC # BLD AUTO: 4.8 10E3/UL (ref 4–11)

## 2025-04-23 PROCEDURE — 85025 COMPLETE CBC W/AUTO DIFF WBC: CPT | Mod: ORL | Performed by: FAMILY MEDICINE

## 2025-04-23 PROCEDURE — P9604 ONE-WAY ALLOW PRORATED TRIP: HCPCS | Mod: ORL | Performed by: FAMILY MEDICINE

## 2025-04-23 PROCEDURE — 36415 COLL VENOUS BLD VENIPUNCTURE: CPT | Mod: ORL | Performed by: FAMILY MEDICINE

## 2025-04-23 NOTE — TELEPHONE ENCOUNTER
----- Message from Jovanna Nobles sent at 4/23/2025 11:35 AM CDT -----  Recheck Hgb, Fe sats, and Ferritin, on 4/28. Monitor for signs of bleeding.

## 2025-04-24 ENCOUNTER — MEDICAL CORRESPONDENCE (OUTPATIENT)
Dept: HEALTH INFORMATION MANAGEMENT | Facility: CLINIC | Age: 70
End: 2025-04-24

## 2025-04-24 ENCOUNTER — TRANSITIONAL CARE UNIT VISIT (OUTPATIENT)
Dept: GERIATRICS | Facility: CLINIC | Age: 70
End: 2025-04-24
Payer: MEDICARE

## 2025-04-24 VITALS
BODY MASS INDEX: 21.52 KG/M2 | OXYGEN SATURATION: 94 % | HEART RATE: 86 BPM | DIASTOLIC BLOOD PRESSURE: 72 MMHG | TEMPERATURE: 97.6 F | WEIGHT: 142 LBS | SYSTOLIC BLOOD PRESSURE: 115 MMHG | HEIGHT: 68 IN | RESPIRATION RATE: 16 BRPM

## 2025-04-24 DIAGNOSIS — Z79.899 POLYPHARMACY: ICD-10-CM

## 2025-04-24 DIAGNOSIS — N17.9 ACUTE KIDNEY INJURY: ICD-10-CM

## 2025-04-24 DIAGNOSIS — I10 ESSENTIAL HYPERTENSION: ICD-10-CM

## 2025-04-24 DIAGNOSIS — I67.1 RIGHT INTERNAL CAROTID ARTERY ANEURYSM: ICD-10-CM

## 2025-04-24 DIAGNOSIS — G47.33 OSA (OBSTRUCTIVE SLEEP APNEA): ICD-10-CM

## 2025-04-24 DIAGNOSIS — K59.01 SLOW TRANSIT CONSTIPATION: ICD-10-CM

## 2025-04-24 DIAGNOSIS — G35 MS (MULTIPLE SCLEROSIS) (H): ICD-10-CM

## 2025-04-24 DIAGNOSIS — E03.8 OTHER SPECIFIED HYPOTHYROIDISM: ICD-10-CM

## 2025-04-24 DIAGNOSIS — Z17.0 MALIGNANT NEOPLASM OF UPPER-OUTER QUADRANT OF LEFT BREAST IN FEMALE, ESTROGEN RECEPTOR POSITIVE (H): ICD-10-CM

## 2025-04-24 DIAGNOSIS — G82.20 SPASTIC PARAPLEGIA SECONDARY TO MULTIPLE SCLEROSIS (H): ICD-10-CM

## 2025-04-24 DIAGNOSIS — G35 SPASTIC PARAPLEGIA SECONDARY TO MULTIPLE SCLEROSIS (H): ICD-10-CM

## 2025-04-24 DIAGNOSIS — N95.8 GENITOURINARY SYNDROME OF MENOPAUSE: ICD-10-CM

## 2025-04-24 DIAGNOSIS — R41.89 EPISODE OF ALTERED COGNITION: Primary | ICD-10-CM

## 2025-04-24 DIAGNOSIS — C50.412 MALIGNANT NEOPLASM OF UPPER-OUTER QUADRANT OF LEFT BREAST IN FEMALE, ESTROGEN RECEPTOR POSITIVE (H): ICD-10-CM

## 2025-04-24 DIAGNOSIS — R53.81 PHYSICAL DECONDITIONING: ICD-10-CM

## 2025-04-24 DIAGNOSIS — F32.9 MAJOR DEPRESSIVE DISORDER, REMISSION STATUS UNSPECIFIED, UNSPECIFIED WHETHER RECURRENT: ICD-10-CM

## 2025-04-24 DIAGNOSIS — G92.8 TOXIC METABOLIC ENCEPHALOPATHY: ICD-10-CM

## 2025-04-24 NOTE — LETTER
4/24/2025      Marylee Woods  [address]  [place] [address]        [facility] GERIATRICS DISCHARGE SUMMARY  PATIENT'S NAME: Marylee Woods  YOB: 1955  MEDICAL RECORD NUMBER:  [id number]  Place of Service where encounter took place:  [facility] ([facility]) [[id number]]    PRIMARY CARE PROVIDER AND CLINIC RESPONSIBLE AFTER TRANSFER:   Carolina Pulliam MD, [address] [place] [address] / [place] [address]      Transferring providers: Jovanna Nobles DNP, Benjamin Rosenstein, MD  Recent Hospitalization/ED:  [facility] Hospital stay 4/8/25 to 4/16/25.  Date of SNF Admission: 4/16/25  Date of SNF (anticipated) Discharge: 4/26/25  Discharged to: previous independent home  Cognitive Scores: BIMS: 15/15  Physical Function: Wheelchair dependent  DME: No new DME needed    CODE STATUS/ADVANCE DIRECTIVES DISCUSSION:  Prior   ALLERGIES: Bactrim [sulfamethoxazole-trimethoprim], Hydrochlorothiazide, Sulfamethizole, Amantadine, Budesonide, and Budesonide-formoterol fumarate    NURSING FACILITY COURSE   Medication Changes/Rationale:   See below    Summary of nursing facility stay:   Marylee Woods  is a 69 year old  (1955), with PMH: multiple sclerosis wheelchair bound with spastic paraplegia (follows Dr. Redman and receives Briumvi infusions as outpatient), malignant neoplasm of left breast s/p lumpectomy in 2022, MARYLU, HTN, anemia, and depression.  **[facility] 3/9-3/14: admitted for UTI and acute colitis due to norovirus. Treated with IV ampicillin -> PO amoxicillin and discharged back home.  **Seen by PCP on 3/20 who ordered UA/UC for concern of recurrent UTI and reported ongoing neurological changes since last hospital discharge; UC likely contaminated. On 3/24, patient reported dysuria. Patient had 10 tabs left of amoxicillin from previous UTI; PCP advised to take the amoxicillin for 5 days.   **[facility] 3/26/25-3/29/25: Presented to ED for ongoing  confusion and weakness. CRP 11. UA showed trace blood, large LE, moderate bacteria, and moderate yeast. Given IV ceftriaxone x1 dose in ED. Neurology consulted; no new focal findings on exam to support MS flare; weakness likely prolonged illness and deconditioning; head drop spells may be hypotension related from prolonged sitting or possible sleep-related phenomenon; consider follow-up with sleep neurologist to assess for sleep-related etiology. UC grew urogenital germaine; IV rocephin-> PO amoxicillin on 3/27; plan for 5 day amoxicillin given urinary symptoms. Discharged to this facility for rehab.  **This hospitalization: Sent to ED from TCU for evaluation of altered mental status; patient was planning to discharge from TCU but staff had difficulty waking her up; patient was non-verbal with right sided facial droop noted. Stroke code unremarkable. CT head without concern. Labs noted Cr 1.03, increased from baseline. PTA armodafinil, baclofen, and gabapentin held   Neurology consulted; no focal findings suggestive of MS flare; EGG with no significant abnormalities;  neurologic presentation likely toxic metabolic encephalopathy. UC grew MDRO with enterobacter, enterococcus, and staph. ID consulted; felt lower concern for UTI given lack of urinary symptoms but not unreasonable to treat with 3-day course of Cipro as recommended by Antibiotic Stewardship team. Mental status improved over time. Strong suspicion for medication effect; PTA gabapentin and armodafinil discontinued. Also noted diarrhea but negative for C. Diff. Discharged to this facility for medical management, rehab, and nursing care.    In TCU:  4/23: Nursing staff reported patient crying due to pain and spasms in the afternoon; writer ordered to start Robaxin 250 mg as needed.     Today:  Nursing staff reports patient c/o pain and spasms last night; tylenol, baclofen, and PRN robaxin were given and symptoms resolved.    Patient is seen today for a visit in  "her room. She is sitting in her WC watching television. Feels \"pretty well\"; notes \"hot then cold\"; she was feeling warm in her room and opened her window but now she feels cold. Feels \"no pain right now\". Worked with therapies this morning; reportedly did stretching and worked on \"slide board transfer\".  Appetite is \"good\". Last BM was this morning; \"no\" belly pain. \"No\" concerns with bladder; notes incontinence but denies dysuria. Sleeping \"iffy\"; recalls spasms last night. Nursing staff gave her medications and floated her heels which help gave her relief. Mood is \"anxious to go home\". \"No\" depression or SI. Denies CP, palpitations, lightheadedness, dizziness, fatigue, SOB, fever, chills, nausea/vomiting concerns today.    ASSESSMENT/PLAN:        Episode of altered cognition  Toxic metabolic encephalopathy   Polypharmacy  Reason for this hospitalization and previous one. IP neurology consulted; felt less likely MS flare. Strong suspicion for medication effect; PTA gabapentin and armodafinil discontinued at hospital discharge.  -follow up with neurology  -monitor mood and behaviors     Acute kidney injury  Noted on hospital admission. Likely contributed to medication effect. DENNISE likely prerenal secondary to poor intake; hyaline casts noted. Ensure supplementation started in hospital. 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 Follows Dr. Redman from [facility]; MRI on 3/21 reportedly stable. In TCU, PRN baclofen added on 4/3 for reported afternoon spasms. Today, no concerns  -baclofen 20 mg qAM and baclofen 30 mg at bedtime; 20 mg daily as needed  -follow-up with neurology as recommended     Essential hypertension  /62- 133/69 mmHg. 81-90 bpm.   -continue losartan 50 mg twice daily  -continue amlodipine 5 mg daily  -follow BP " and adjust medications as needed     Right internal carotid artery aneurysm  Incidental finding of right internal carotid artery saccular aneurysm on 4/8; recommend outpatient follow-up.     Genitourinary syndrome of menopause   PTA prophylactic Macrobid discontinued at hospital discharge. Today, patient denies dysuria.   -continue estradiol 2 gram every Mon and Thursday  -monitor for worsening signs/symptoms     MARYLU (obstructive sleep apnea)  IP neurology recommended seeing sleep neurologist to evaluate for sleep-related etiology for head drop spells.  -follow with sleep medicine     Major depressive disorder, remission status unspecified, unspecified whether recurrent  -duloxetine 80 mg daily  -monitor mood and behaviors     Malignant neoplasm of upper-outer quadrant of left breast in female, estrogen receptor positive (H)  Previously on Lemtrada; transitioned Briumvi in Feb 2025; next infusion due in 5 months.   -follow-up with oncology     Other specified hypothyroidism  Last TSH 1.31 (3/26/25)  -levothyroxine 112 mcg daily  -trend TSH periodically     Slow transit constipation   -continue miralax 17 g daily  -adjust bowel meds as needed     Takes dietary supplements   Clarified orders in TCU.  -calcium carbonate 500 mg daily vs 600 mg due to no supply per pharmacy; may return to 500 mg daily at TCU discharge  -take cranberry supplement 200 mg daily while in TCU     Physical deconditioning  Secondary to diagnoses above and recent hospitalization. In TCU for rehabilitation.   -Plan to discharge with home health      Discharge Medications:  MED REC REQUIRED  Post Medication Reconciliation Status: discharge medications reconciled and changed, per note/orders       Current Outpatient Medications   Medication Sig Dispense Refill     acetaminophen (TYLENOL) 500 MG tablet Take 1-2 tablets (500-1,000 mg) by mouth every 8 hours as needed for mild pain or fever (greater than 101 degrees)       acyclovir (ZOVIRAX) 400 MG  tablet TAKE 1 TABLET BY MOUTH EVERY 12 HOURS 180 tablet 2     amLODIPine (NORVASC) 5 MG tablet Take 1 tablet by mouth once daily 90 tablet 2     baclofen (LIORESAL) 10 MG tablet Take 2 tablets (20 mg) in the morning and 3 tablets (30 mg) in the evening. May take one extra 20 mg dose in the afternoon as needed       Calcium Carbonate Antacid 600 MG CHEW Take 600 mg by mouth daily       CRANBERRY PO Take by mouth. Take 1 capsule once daily.       DULoxetine (CYMBALTA) 20 MG capsule TAKE 1 CAPSULE BY MOUTH ONCE DAILY ALONG  WITH  A  60  MG  TABLET  FOR  A  TOTAL  DOSE  OF  80  MG  DAILY 90 capsule 4     DULoxetine (CYMBALTA) 60 MG capsule Take 1 capsule by mouth once daily (Patient taking differently: Take 1 capsule by mouth once daily, along with 20 mg for total of 80 mg daily dose.) 90 capsule 4     estradiol (ESTRACE) 0.1 MG/GM vaginal cream Place 2 g vaginally twice a week. (Patient taking differently: Place 2 g vaginally daily. For 2 weeks.) 42.5 g 4     famotidine (PEPCID) 40 MG tablet TAKE 1 TABLET BY MOUTH AT BEDTIME 90 tablet 2     latanoprost (XALATAN) 0.005 % ophthalmic solution INSTILL 1 DROP INTO BOTH EYES EVERY DAY AS DIRECTED PT WILL NEED TO BE SEEN FOR FUTURE REFILLS       levothyroxine (SYNTHROID/LEVOTHROID) 112 MCG tablet Take 1 tablet (112 mcg) by mouth daily 90 tablet 3     losartan (COZAAR) 50 MG tablet Take 1 tablet (50 mg) by mouth 2 times daily 180 tablet 4     magnesium oxide (MAG-OX) 400 MG tablet Take 400 mg by mouth daily.       methocarbamol (ROBAXIN) 500 MG tablet Take 250 mg by mouth daily as needed for muscle spasms.       Multiple Vitamins-Minerals (MULTIPLE VITAMINS/WOMENS PO) Take by mouth daily       ondansetron (ZOFRAN ODT) 4 MG ODT tab Take 1 tablet (4 mg) by mouth every 6 hours as needed for nausea or vomiting. 12 tablet 0       Controlled medications:   not applicable/none     Past Medical History:   Past Medical History:   Diagnosis Date     Acute cystitis without hematuria       "Acute pain of right knee      Atypical ductal hyperplasia of left breast 02/2022     Candida esophagitis - 2019 (H)      CKD (chronic kidney disease) stage 3, GFR 30-59 ml/min (H)      Community acquired pneumonia, unspecified laterality      Cough, unspecified type 04/05/2024     Depression      Epigastric pain      Former tobacco use      Fracture of femur, distal, left, closed (H)      Gastro-oesophageal reflux disease      Graves disease      History of fracture of fibula      History of tibial fracture      HSV (herpes simplex virus) infection      Hyperlipidemia with target LDL less than 130      Hyponatremia      L tib fx s/p IM nailing      Lung nodules     Currently managed with follow up imaging by [facility] result Team.        Multiple sclerosis (H)      Osteoporosis      Postablative hypothyroidism      Physical Exam:   Vitals: /72   Pulse 86   Temp 97.6  F (36.4  C)   Resp 16   Ht 1.727 m (5' 8\")   Wt 64.4 kg (142 lb)   LMP 01/31/2004 (Approximate)   SpO2 94%   BMI 21.59 kg/m    BMI: Body mass index is 21.59 kg/m .  GENERAL APPEARANCE:  Alert, in no distress  HEENT:  atraumatic, EOM intact, moist mucus membranes  RESP:  non-labored breathing, lungs clear on auscultation, no respiratory distress, no cough  CV:  Rate regular, S1 S2 noted, 1+ BLE edema  ABDOMEN:  soft, non-distended, non-tender, bowel sounds active  M/S:   wheelchair bound, self feeds, AFO on BLE, tone equal bilaterally  SKIN:  warm, dry, thin, fragile, no obvious rash, lesions, ulcerations or petechiae   NEURO:   Face is symmetric, examination of sensation by touch normal, follows and tracks, slow speech  PSYCH:  calm, cooperative       SNF labs: Labs reviewed as per [facility] and/or Care Everywhere.      DISCHARGE PLAN:  Follow up labs: No labs orders/due  Medical Follow Up:      Follow up with primary care provider in 5-7 days  Follow up with neurology   Follow up with sleep medicine    Current [place] scheduled " appointments:  Appointments in Next Year      May 08, 2025 9:15 AM  (Arrive by 9:00 AM)  MA SCREENING BILATERAL W/ KIM with [id number]  [facility] [place] ([facility] ) [phone]     May 08, 2025 10:00 AM  (Arrive by 9:45 AM)  Return Patient with Kenny Martinez MD  [facility] ([facility] ) [phone]     Dec 15, 2025 8:30 AM  (Arrive by 8:15 AM)  RETURN ENDOCRINE with Neli Bean MD  [facility] ) [phone]          Discharge Services: Home Care:  Physical Therapy, Registered Nurse, and Home Health Aide      TOTAL DISCHARGE TIME:   Greater than 30 minutes  Electronically signed by:  Dr. Jovanna Nobles, DNP, APRN, AGNP-BC, PHN      This note was completed with the assistance of dictation software. Typos and word substitution-errors are expected and unintended.                Sincerely,        SWAPNIL Reddy CNP    Electronically signed

## 2025-04-24 NOTE — PROGRESS NOTES
[facility] GERIATRICS DISCHARGE SUMMARY  PATIENT'S NAME: Marylee Woods  YOB: 1955  MEDICAL RECORD NUMBER:  [id number]  Place of Service where encounter took place:  [facility] ([facility]) [[id number]]    PRIMARY CARE PROVIDER AND CLINIC RESPONSIBLE AFTER TRANSFER:   Carolina Pulliam MD, [address] [place] [address]      Transferring providers: Jovanna Nobles DNP, Benjamin Rosenstein, MD  Recent Hospitalization/ED:  [facility] to 4/16/25.  Date of SNF Admission: 4/16/25  Date of SNF (anticipated) Discharge: 4/26/25  Discharged to: previous independent home  Cognitive Scores: BIMS: 15/15  Physical Function: Wheelchair dependent  DME: No new DME needed    CODE STATUS/ADVANCE DIRECTIVES DISCUSSION:  Prior   ALLERGIES: Bactrim [sulfamethoxazole-trimethoprim], Hydrochlorothiazide, Sulfamethizole, Amantadine, Budesonide, and Budesonide-formoterol fumarate    NURSING FACILITY COURSE   Medication Changes/Rationale:   See below    Summary of nursing facility stay:   Marylee Woods  is a 69 year old  (1955), with PMH: multiple sclerosis wheelchair bound with spastic paraplegia (follows Dr. Redman and receives Briumvi infusions as outpatient), malignant neoplasm of left breast s/p lumpectomy in 2022, MARYLU, HTN, anemia, and depression.  **[facility] 3/9-3/14: admitted for UTI and acute colitis due to norovirus. Treated with IV ampicillin -> PO amoxicillin and discharged back home.  **Seen by PCP on 3/20 who ordered UA/UC for concern of recurrent UTI and reported ongoing neurological changes since last hospital discharge; UC likely contaminated. On 3/24, patient reported dysuria. Patient had 10 tabs left of amoxicillin from previous UTI; PCP advised to take the amoxicillin for 5 days.   **[facility] 3/26/25-3/29/25: Presented to ED for ongoing confusion and weakness. CRP 11. UA showed trace blood, large LE, moderate bacteria, and  "moderate yeast. Given IV ceftriaxone x1 dose in ED. Neurology consulted; no new focal findings on exam to support MS flare; weakness likely prolonged illness and deconditioning; head drop spells may be hypotension related from prolonged sitting or possible sleep-related phenomenon; consider follow-up with sleep neurologist to assess for sleep-related etiology. UC grew urogenital germaine; IV rocephin-> PO amoxicillin on 3/27; plan for 5 day amoxicillin given urinary symptoms. Discharged to this facility for rehab.  **This hospitalization: Sent to ED from TCU for evaluation of altered mental status; patient was planning to discharge from TCU but staff had difficulty waking her up; patient was non-verbal with right sided facial droop noted. Stroke code unremarkable. CT head without concern. Labs noted Cr 1.03, increased from baseline. PTA armodafinil, baclofen, and gabapentin held   Neurology consulted; no focal findings suggestive of MS flare; EGG with no significant abnormalities;  neurologic presentation likely toxic metabolic encephalopathy. UC grew MDRO with enterobacter, enterococcus, and staph. ID consulted; felt lower concern for UTI given lack of urinary symptoms but not unreasonable to treat with 3-day course of Cipro as recommended by Antibiotic Stewardship team. Mental status improved over time. Strong suspicion for medication effect; PTA gabapentin and armodafinil discontinued. Also noted diarrhea but negative for C. Diff. Discharged to this facility for medical management, rehab, and nursing care.    In TCU:  4/23: Nursing staff reported patient crying due to pain and spasms in the afternoon; writer ordered to start Robaxin 250 mg as needed.     Today:  Nursing staff reports patient c/o pain and spasms last night; tylenol, baclofen, and PRN robaxin were given and symptoms resolved.    Patient is seen today for a visit in her room. She is sitting in her WC watching television. Feels \"pretty well\"; notes \"hot " "then cold\"; she was feeling warm in her room and opened her window but now she feels cold. Feels \"no pain right now\". Worked with therapies this morning; reportedly did stretching and worked on \"slide board transfer\".  Appetite is \"good\". Last BM was this morning; \"no\" belly pain. \"No\" concerns with bladder; notes incontinence but denies dysuria. Sleeping \"iffy\"; recalls spasms last night. Nursing staff gave her medications and floated her heels which help gave her relief. Mood is \"anxious to go home\". \"No\" depression or SI. Denies CP, palpitations, lightheadedness, dizziness, fatigue, SOB, fever, chills, nausea/vomiting concerns today.    ASSESSMENT/PLAN:        Episode of altered cognition  Toxic metabolic encephalopathy   Polypharmacy  Reason for this hospitalization and previous one. IP neurology consulted; felt less likely MS flare. Strong suspicion for medication effect; PTA gabapentin and armodafinil discontinued at hospital discharge.  -follow up with neurology  -monitor mood and behaviors     Acute kidney injury  Noted on hospital admission. Likely contributed to medication effect. DENNISE likely prerenal secondary to poor intake; hyaline casts noted. Ensure supplementation started in hospital. 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 Follows Dr. Redman from [facility]; MRI on 3/21 reportedly stable. In TCU, PRN baclofen added on 4/3 for reported afternoon spasms. Today, no concerns  -baclofen 20 mg qAM and baclofen 30 mg at bedtime; 20 mg daily as needed  -follow-up with neurology as recommended     Essential hypertension  /62- 133/69 mmHg. 81-90 bpm.   -continue losartan 50 mg twice daily  -continue amlodipine 5 mg daily  -follow BP and adjust medications as needed     Right internal carotid artery aneurysm  Incidental " finding of right internal carotid artery saccular aneurysm on 4/8; recommend outpatient follow-up.     Genitourinary syndrome of menopause   PTA prophylactic Macrobid discontinued at hospital discharge. Today, patient denies dysuria.   -continue estradiol 2 gram every Mon and Thursday  -monitor for worsening signs/symptoms     MARYLU (obstructive sleep apnea)  IP neurology recommended seeing sleep neurologist to evaluate for sleep-related etiology for head drop spells.  -follow with sleep medicine     Major depressive disorder, remission status unspecified, unspecified whether recurrent  -duloxetine 80 mg daily  -monitor mood and behaviors     Malignant neoplasm of upper-outer quadrant of left breast in female, estrogen receptor positive (H)  Previously on Lemtrada; transitioned Briumvi in Feb 2025; next infusion due in 5 months.   -follow-up with oncology     Other specified hypothyroidism  Last TSH 1.31 (3/26/25)  -levothyroxine 112 mcg daily  -trend TSH periodically     Slow transit constipation   -continue miralax 17 g daily  -adjust bowel meds as needed     Takes dietary supplements   Clarified orders in TCU.  -calcium carbonate 500 mg daily vs 600 mg due to no supply per pharmacy; may return to 500 mg daily at TCU discharge  -take cranberry supplement 200 mg daily while in TCU     Physical deconditioning  Secondary to diagnoses above and recent hospitalization. In TCU for rehabilitation.   -Plan to discharge with home health      Discharge Medications:  MED REC REQUIRED  Post Medication Reconciliation Status: discharge medications reconciled and changed, per note/orders       Current Outpatient Medications   Medication Sig Dispense Refill    acetaminophen (TYLENOL) 500 MG tablet Take 1-2 tablets (500-1,000 mg) by mouth every 8 hours as needed for mild pain or fever (greater than 101 degrees)      acyclovir (ZOVIRAX) 400 MG tablet TAKE 1 TABLET BY MOUTH EVERY 12 HOURS 180 tablet 2    amLODIPine (NORVASC) 5 MG  tablet Take 1 tablet by mouth once daily 90 tablet 2    baclofen (LIORESAL) 10 MG tablet Take 2 tablets (20 mg) in the morning and 3 tablets (30 mg) in the evening. May take one extra 20 mg dose in the afternoon as needed      Calcium Carbonate Antacid 600 MG CHEW Take 600 mg by mouth daily      CRANBERRY PO Take by mouth. Take 1 capsule once daily.      DULoxetine (CYMBALTA) 20 MG capsule TAKE 1 CAPSULE BY MOUTH ONCE DAILY ALONG  WITH  A  60  MG  TABLET  FOR  A  TOTAL  DOSE  OF  80  MG  DAILY 90 capsule 4    DULoxetine (CYMBALTA) 60 MG capsule Take 1 capsule by mouth once daily (Patient taking differently: Take 1 capsule by mouth once daily, along with 20 mg for total of 80 mg daily dose.) 90 capsule 4    estradiol (ESTRACE) 0.1 MG/GM vaginal cream Place 2 g vaginally twice a week. (Patient taking differently: Place 2 g vaginally daily. For 2 weeks.) 42.5 g 4    famotidine (PEPCID) 40 MG tablet TAKE 1 TABLET BY MOUTH AT BEDTIME 90 tablet 2    latanoprost (XALATAN) 0.005 % ophthalmic solution INSTILL 1 DROP INTO BOTH EYES EVERY DAY AS DIRECTED PT WILL NEED TO BE SEEN FOR FUTURE REFILLS      levothyroxine (SYNTHROID/LEVOTHROID) 112 MCG tablet Take 1 tablet (112 mcg) by mouth daily 90 tablet 3    losartan (COZAAR) 50 MG tablet Take 1 tablet (50 mg) by mouth 2 times daily 180 tablet 4    magnesium oxide (MAG-OX) 400 MG tablet Take 400 mg by mouth daily.      methocarbamol (ROBAXIN) 500 MG tablet Take 250 mg by mouth daily as needed for muscle spasms.      Multiple Vitamins-Minerals (MULTIPLE VITAMINS/WOMENS PO) Take by mouth daily      ondansetron (ZOFRAN ODT) 4 MG ODT tab Take 1 tablet (4 mg) by mouth every 6 hours as needed for nausea or vomiting. 12 tablet 0       Controlled medications:   not applicable/none     Past Medical History:   Past Medical History:   Diagnosis Date    Acute cystitis without hematuria     Acute pain of right knee     Atypical ductal hyperplasia of left breast 02/2022    Candida esophagitis  "- 2019 (H)     CKD (chronic kidney disease) stage 3, GFR 30-59 ml/min (H)     Community acquired pneumonia, unspecified laterality     Cough, unspecified type 04/05/2024    Depression     Epigastric pain     Former tobacco use     Fracture of femur, distal, left, closed (H)     Gastro-oesophageal reflux disease     Graves disease     History of fracture of fibula     History of tibial fracture     HSV (herpes simplex virus) infection     Hyperlipidemia with target LDL less than 130     Hyponatremia     L tib fx s/p IM nailing     Lung nodules     Currently managed with follow up imaging by [facility] result Team.       Multiple sclerosis (H)     Osteoporosis     Postablative hypothyroidism      Physical Exam:   Vitals: /72   Pulse 86   Temp 97.6  F (36.4  C)   Resp 16   Ht 1.727 m (5' 8\")   Wt 64.4 kg (142 lb)   LMP 01/31/2004 (Approximate)   SpO2 94%   BMI 21.59 kg/m    BMI: Body mass index is 21.59 kg/m .  GENERAL APPEARANCE:  Alert, in no distress  HEENT:  atraumatic, EOM intact, moist mucus membranes  RESP:  non-labored breathing, lungs clear on auscultation, no respiratory distress, no cough  CV:  Rate regular, S1 S2 noted, 1+ BLE edema  ABDOMEN:  soft, non-distended, non-tender, bowel sounds active  M/S:   wheelchair bound, self feeds, AFO on BLE, tone equal bilaterally  SKIN:  warm, dry, thin, fragile, no obvious rash, lesions, ulcerations or petechiae   NEURO:   Face is symmetric, examination of sensation by touch normal, follows and tracks, slow speech  PSYCH:  calm, cooperative       SNF labs: Labs reviewed as per [facility] and/or Care Everywhere.      DISCHARGE PLAN:  Follow up labs: No labs orders/due  Medical Follow Up:      Follow up with primary care provider in 5-7 days  Follow up with neurology   Follow up with sleep medicine    Current [facility] scheduled appointments:  Appointments in Next Year      May 08, 2025 9:15 AM  (Arrive by 9:00 AM)  MA SCREENING BILATERAL W/ KIM " with [id number]  [facility] [place] ([facility] ) [phone]     May 08, 2025 10:00 AM  (Arrive by 9:45 AM)  Return Patient with Kenny Martinez MD  [facility] ([facility] ) [phone]     Dec 15, 2025 8:30 AM  (Arrive by 8:15 AM)  RETURN ENDOCRINE with Neli Bean MD  [facility] [place] ([facility] ) [phone]          Discharge Services: Home Care:  Physical Therapy, Registered Nurse, and Home Health Aide      TOTAL DISCHARGE TIME:   Greater than 30 minutes  Electronically signed by:  Dr. Jovanna Nobles, DNP, APRN, AGNP-BC, PHN      This note was completed with the assistance of dictation software. Typos and word substitution-errors are expected and unintended.

## 2025-04-26 ENCOUNTER — MEDICAL CORRESPONDENCE (OUTPATIENT)
Dept: HEALTH INFORMATION MANAGEMENT | Facility: CLINIC | Age: 70
End: 2025-04-26
Payer: MEDICARE

## 2025-04-28 ENCOUNTER — PATIENT OUTREACH (OUTPATIENT)
Dept: CARE COORDINATION | Facility: CLINIC | Age: 70
End: 2025-04-28
Payer: MEDICARE

## 2025-04-28 ASSESSMENT — ACTIVITIES OF DAILY LIVING (ADL): DEPENDENT_IADLS:: TRANSPORTATION;CLEANING;LAUNDRY;SHOPPING

## 2025-04-28 NOTE — PROGRESS NOTES
Clinic Care Coordination Contact  Clinic Care Coordination Contact  OUTREACH with Post Discharge Assessment    Referral Information:  Referral Source: PCP    Chief Complaint   Patient presents with    Clinic Care Coordination - Post Hospital     [facility] stating that patient is discharging tomorrow (4/26/25) and is going home with [facility] for PT, OT, HHA.     JUSTIN SINGH then called patient on this date to complete TCM follow-up. Patient answered and voiced she's doing okay. Patient voiced she's happy to be home with her cats and has been improving. Patient voiced that she needs to make follow-up doctor appointments including a PCP appointment. JUSTIN SINGH offered to assist her with making PCP appointment, but patient declined. Patient voiced that she is waiting to hear from [facility] to schedule home care. JUSTIN SINGH provided number for [facility] and offered to assist with calling [facility] if they don't call patient to schedule. Patient voiced she will plan on calling [facility] if she doesn't hear from them. Patient voiced no further questions or concerns at this time.    JUSTIN SINGH will follow-up with patient monthly and as needed.      Universal Utilization: See information below     Utilization      No Show Count (past year)  0             ED Visits  3             Hospital Admissions  3                    Current as of: 4/28/2025  2:49 AM                Clinical Concerns:  Current Medical Concerns:  NA- recently discharged home from hospital/TCU.     Current Behavioral Concerns: NA    Education Provided to patient: JUSTIN SINGH encouraged patient to work with home care and reach out to PCP for scheduling hospital follow-up appointment.       Health Maintenance Reviewed:    Clinical Pathway: None    Transitions of Care Outreach    Most Recent Admission Date: 4/8/2025   Most Recent Admission Diagnosis: Stupor - R40.1     Most Recent Discharge  Date: 4/16/2025   Most Recent Discharge Diagnosis: Stupor - R40.1  MS (multiple sclerosis) (H) - G35  Recurrent UTI - N39.0  Urinary tract infection without hematuria, site unspecified - N39.0     Transitions of Care Assessment    Discharge Assessment  How are you doing now that you are home?: I'm doing okay  How are your symptoms? (Red Flag symptoms escalate to triage hotline per guidelines): Improved  Do you know how to contact your clinic care team if you have future questions or changes to your health status? : Yes  Does the patient have their discharge instructions? : Yes  Does the patient have questions regarding their discharge instructions? : No  Were you started on any new medications or were there changes to any of your previous medications? : Yes  Does the patient have all of their medications?: Yes  Do you have questions regarding any of your medications? : No  Do you have all of your needed medical supplies or equipment (DME)?  (i.e. oxygen tank, CPAP, cane, etc.): Yes    Post-op ([facility]A Only)  If the patient had a surgery or procedure, do they have any questions for a nurse?: No    Post-op (Clinicians Only)  Did the patient have surgery or a procedure: No  Fever: No  Chills: No  Eating & Drinking: eating and drinking without complaints/concerns  PO Intake: regular diet  Bowel Function: normal  Urinary Status: voiding without complaint/concerns    Medication Management:  Medication review status: Medication not reviewed at this time.   Current Outpatient Medications   Medication Sig Dispense Refill    acetaminophen (TYLENOL) 500 MG tablet Take 1-2 tablets (500-1,000 mg) by mouth every 8 hours as needed for mild pain or fever (greater than 101 degrees)      acyclovir (ZOVIRAX) 400 MG tablet TAKE 1 TABLET BY MOUTH EVERY 12 HOURS 180 tablet 2    amLODIPine (NORVASC) 5 MG tablet Take 1 tablet by mouth once daily 90 tablet 2    baclofen (LIORESAL) 10 MG tablet Take 2 tablets (20 mg) in the morning and 3  tablets (30 mg) in the evening. May take one extra 20 mg dose in the afternoon as needed      Calcium Carbonate Antacid 600 MG CHEW Take 600 mg by mouth daily      CRANBERRY PO Take by mouth. Take 1 capsule once daily.      DULoxetine (CYMBALTA) 20 MG capsule TAKE 1 CAPSULE BY MOUTH ONCE DAILY ALONG  WITH  A  60  MG  TABLET  FOR  A  TOTAL  DOSE  OF  80  MG  DAILY 90 capsule 4    DULoxetine (CYMBALTA) 60 MG capsule Take 1 capsule by mouth once daily (Patient taking differently: Take 1 capsule by mouth once daily, along with 20 mg for total of 80 mg daily dose.) 90 capsule 4    estradiol (ESTRACE) 0.1 MG/GM vaginal cream Place 2 g vaginally twice a week. (Patient taking differently: Place 2 g vaginally daily. For 2 weeks.) 42.5 g 4    famotidine (PEPCID) 40 MG tablet TAKE 1 TABLET BY MOUTH AT BEDTIME 90 tablet 2    latanoprost (XALATAN) 0.005 % ophthalmic solution INSTILL 1 DROP INTO BOTH EYES EVERY DAY AS DIRECTED PT WILL NEED TO BE SEEN FOR FUTURE REFILLS      levothyroxine (SYNTHROID/LEVOTHROID) 112 MCG tablet Take 1 tablet (112 mcg) by mouth daily 90 tablet 3    losartan (COZAAR) 50 MG tablet Take 1 tablet (50 mg) by mouth 2 times daily 180 tablet 4    magnesium oxide (MAG-OX) 400 MG tablet Take 400 mg by mouth daily.      methocarbamol (ROBAXIN) 500 MG tablet Take 250 mg by mouth daily as needed for muscle spasms.      Multiple Vitamins-Minerals (MULTIPLE VITAMINS/WOMENS PO) Take by mouth daily      ondansetron (ZOFRAN ODT) 4 MG ODT tab Take 1 tablet (4 mg) by mouth every 6 hours as needed for nausea or vomiting. 12 tablet 0     No current facility-administered medications for this visit.     Functional Status:  Dependent ADLs:: Wheelchair-independent  Dependent IADLs:: Transportation, Cleaning, Laundry, Shopping    Living Situation:  Current living arrangement:: I live in a private home with family    Lifestyle & Psychosocial Needs:    Social Drivers of Health     Food Insecurity: Low Risk  (4/9/2025)    Food  Insecurity     Within the past 12 months, did you worry that your food would run out before you got money to buy more?: No     Within the past 12 months, did the food you bought just not last and you didn t have money to get more?: No   Depression: Not at risk (2/19/2025)    PHQ-2     PHQ-2 Score: 2   Housing Stability: Low Risk  (4/9/2025)    Housing Stability     Do you have housing? : Yes     Are you worried about losing your housing?: No   Tobacco Use: Medium Risk (3/21/2025)    Patient History     Smoking Tobacco Use: Former     Smokeless Tobacco Use: Never     Passive Exposure: Current   Financial Resource Strain: Low Risk  (4/9/2025)    Financial Resource Strain     Within the past 12 months, have you or your family members you live with been unable to get utilities (heat, electricity) when it was really needed?: No   Alcohol Use: Not on file   Transportation Needs: Low Risk  (4/9/2025)    Transportation Needs     Within the past 12 months, has lack of transportation kept you from medical appointments, getting your medicines, non-medical meetings or appointments, work, or from getting things that you need?: No   Physical Activity: Inactive (4/2/2024)    Exercise Vital Sign     Days of Exercise per Week: 0 days     Minutes of Exercise per Session: 20 min   Interpersonal Safety: Low Risk  (4/9/2025)    Interpersonal Safety     Do you feel physically and emotionally safe where you currently live?: Yes     Within the past 12 months, have you been hit, slapped, kicked or otherwise physically hurt by someone?: No     Within the past 12 months, have you been humiliated or emotionally abused in other ways by your partner or ex-partner?: No   Stress: No Stress Concern Present (4/2/2024)    [employer] - Occupational Stress Questionnaire     Feeling of Stress : Only a little   Social Connections: Unknown (4/2/2024)    Social Connection and Isolation Panel [NHANES]     Frequency of  Communication with Friends and Family: Not on file     Frequency of Social Gatherings with Friends and Family: Once a week     Attends [employer] Services: Not on file     Active Member of Clubs or Organizations: Not on file     Attends Club or Organization Meetings: Not on file     Marital Status: Not on file   Health Literacy: Not on file      Resources and Interventions:  Current Resources:   Skilled Home Care Services: Skilled Nursing, Physical Therapy, Home Health Aid, Occupational Therapy  Community Resources: None     Equipment Currently Used at Home: grab bar, toilet, walker, rolling, wheelchair, manual, wheelchair, power    Referrals Placed: None     Care Plan:   Care Plan: Mental Health       Problem: Mental Health Symptoms Need Improvement       Goal: Improve management of mental health symptoms and establish with mental health/psychosocial supports       Start Date: 2/24/2025 Expected End Date: 5/25/2025    Recent Progress: 10%    Priority: High    Note:     Barriers: Time and finding someone patient feels comfortable with  Strengths: Willing to accept Care Coordination and motivated  Patient expressed understanding of goal: Yes  Action steps to achieve this goal:  My provider will make a referral for Mental Health [facility].   I will receive a call within 2 business days from Mental Health [facility].  I will call Mental Health [facility] if I don't hear anything. Phone number is: [phone]  I will work with [facility] on finding a therapist.  I will have a therapy session and discuss my mental health concerns.  I will reach out to Care Coordinator with any questions or concerns.     4/28/25: Recently discharged from hospital/TCU. Patient is still needing to schedule a therapy appointment.                                 Patient/Caregiver understanding: Patient reports understanding and denies any additional questions or concerns at this times. JUSTIN CC engaged in AIDET communication during  encounter.       Future Appointments                In 1 week [facility]    In 1 week Kenny Martinez MD [facility]    In 7 months Neli Bean MD [facility]            Follow up Plan     Discharge Follow-Up  Discharge follow up appointment scheduled in alignment with recommended follow up timeframe or Transitions of Risk Category? (Low = within 30 days; Moderate= within 14 days; High= within 7 days): No  Discharge Follow Up Appointment Scheduled with?: Primary Care Provider  Patient's follow up appointment not scheduled: Patient declined scheduling support. Education on the importance of transitions of care follow up. Provided scheduling phone number.    Future Appointments   Date Time Provider Department Center   5/8/2025  9:15 AM [address]   5/8/2025 10:00 AM Kenny Martinez MD [facility]   12/15/2025  8:30 AM Neli Bean MD [facility]       Outpatient Plan as outlined on AVS reviewed with patient.    For any urgent concerns, please contact our 24 hour nurse triage line: [phone] ([phone])       Mayra Brown [facility]  Social Work Care Coordinator  [facility]  [email or web address]  Office: [phone]  Gender pronouns: she/her

## 2025-04-29 ENCOUNTER — TELEPHONE (OUTPATIENT)
Dept: FAMILY MEDICINE | Facility: CLINIC | Age: 70
End: 2025-04-29
Payer: MEDICARE

## 2025-04-29 ENCOUNTER — MEDICAL CORRESPONDENCE (OUTPATIENT)
Dept: HEALTH INFORMATION MANAGEMENT | Facility: CLINIC | Age: 70
End: 2025-04-29

## 2025-04-29 NOTE — TELEPHONE ENCOUNTER
Home Care is calling regarding an established patient with [facility].  Requesting orders from: Carolina Pulliam  RN APPROVED: RN able to provide verbal orders.  Home Care will send orders for signature.  RN will close encounter.  Is this a request for a temporary pause in the home care episode?  No    Orders Requested    Skilled Nursing  Request for initial certification (first set of orders)   Frequency: once a week for 4 weeks   RN gave verbal order: Yes      Physical Therapy  Request for initial evaluation and treatment (one time)   RN gave verbal order: Yes      Occupational Therapy  Request for initial evaluation and treatment (one time)   RN gave verbal order: Yes      HHA (Home Health Aide)  Request for initial certification (first set of orders)   Frequency: once a week for 2 weeks   RN gave verbal order: Yes        Phone number Home Care can be reached at: [phone]  Okay to leave a detailed message?: N/A      Coretta Hernandez RN

## 2025-04-29 NOTE — TELEPHONE ENCOUNTER
Reason for Call:  Appointment Request    Patient requesting this type of appt:  Hospital/ED Follow-Up     Requested provider: Carolina Pulliam    Reason patient unable to be scheduled: Not with their preferred provider during needed time frame    When does patient want to be seen/preferred time: 1-2 days    Comments: Per patient she was recently discharged. Has had a lot of medication changes. Writer advised if unable to schedule w/ PCP we schedule w/ other clinicians on site to ensure time frame completion. Patient states visit must be with PCP. You have nothing until next week-please advise thanks!    Could we send this information to you in [employer] or would you prefer to receive a phone call?:   Patient would prefer a phone call   Okay to leave a detailed message?: Yes at Home number on file [phone] (home)    Call taken on 4/29/2025 at 1:19 PM by Julisa Carlton

## 2025-04-30 ENCOUNTER — PATIENT OUTREACH (OUTPATIENT)
Dept: CARE COORDINATION | Facility: CLINIC | Age: 70
End: 2025-04-30
Payer: MEDICARE

## 2025-05-01 ENCOUNTER — TELEPHONE (OUTPATIENT)
Dept: FAMILY MEDICINE | Facility: CLINIC | Age: 70
End: 2025-05-01

## 2025-05-01 ENCOUNTER — OFFICE VISIT (OUTPATIENT)
Dept: FAMILY MEDICINE | Facility: CLINIC | Age: 70
End: 2025-05-01
Payer: MEDICARE

## 2025-05-01 VITALS
DIASTOLIC BLOOD PRESSURE: 68 MMHG | SYSTOLIC BLOOD PRESSURE: 116 MMHG | WEIGHT: 142.2 LBS | OXYGEN SATURATION: 100 % | RESPIRATION RATE: 20 BRPM | TEMPERATURE: 97.4 F | HEART RATE: 84 BPM | BODY MASS INDEX: 21.55 KG/M2 | HEIGHT: 68 IN

## 2025-05-01 DIAGNOSIS — N39.0 RECURRENT UTI: ICD-10-CM

## 2025-05-01 DIAGNOSIS — Z09 HOSPITAL DISCHARGE FOLLOW-UP: Primary | ICD-10-CM

## 2025-05-01 DIAGNOSIS — K59.09 OTHER CONSTIPATION: Chronic | ICD-10-CM

## 2025-05-01 DIAGNOSIS — G35 MS (MULTIPLE SCLEROSIS) (H): ICD-10-CM

## 2025-05-01 DIAGNOSIS — N95.8 GENITOURINARY SYNDROME OF MENOPAUSE: ICD-10-CM

## 2025-05-01 DIAGNOSIS — Z91.89 AT RISK FOR POLYPHARMACY: ICD-10-CM

## 2025-05-01 DIAGNOSIS — Z09 HOSPITAL DISCHARGE FOLLOW-UP: ICD-10-CM

## 2025-05-01 DIAGNOSIS — N31.9 NEUROGENIC BLADDER: ICD-10-CM

## 2025-05-01 DIAGNOSIS — I10 HYPERTENSION GOAL BP (BLOOD PRESSURE) < 140/90: ICD-10-CM

## 2025-05-01 DIAGNOSIS — B00.9 HSV (HERPES SIMPLEX VIRUS) INFECTION: ICD-10-CM

## 2025-05-01 PROBLEM — R40.1 STUPOR: Status: RESOLVED | Noted: 2025-04-08 | Resolved: 2025-05-01

## 2025-05-01 PROBLEM — R09.02 HYPOXEMIA: Status: RESOLVED | Noted: 2024-04-05 | Resolved: 2025-05-01

## 2025-05-01 PROBLEM — E03.8 OTHER SPECIFIED HYPOTHYROIDISM: Status: RESOLVED | Noted: 2024-12-12 | Resolved: 2025-05-01

## 2025-05-01 RX ORDER — ESTRADIOL 0.1 MG/G
1 CREAM VAGINAL
Qty: 42.5 G | Refills: 4 | Status: SHIPPED | OUTPATIENT
Start: 2025-05-01

## 2025-05-01 RX ORDER — DULOXETIN HYDROCHLORIDE 60 MG/1
CAPSULE, DELAYED RELEASE ORAL
Qty: 90 CAPSULE | Refills: 4 | Status: SHIPPED | OUTPATIENT
Start: 2025-05-01

## 2025-05-01 ASSESSMENT — PAIN SCALES - GENERAL: PAINLEVEL_OUTOF10: MILD PAIN (2)

## 2025-05-01 NOTE — PROGRESS NOTES
Assessment & Plan     TCU discharge visit - already had hospital follow up visit in TCU.    We reviewed discharge summary and TCU notes and plan  Also discussed below:  Problem List as of 5/1/2025 Reviewed: 5/1/2025 12:28 PM by Carolina Pulliam MD            Noted       Medium    1. At risk for polypharmacy causing somnloence 5/1/2025     Overview Signed 5/1/2025 12:22 PM by Carolina Pulliam MD      05/01/2025 A/P: Somnolence from polypharmacy causing ICU admission 2025.  Stopped gabapentin (Neurontin), antibiotics for urinary tract infection prophylaxis, and armodanafil.  Also stopped methocarbamol.  Okay to continue baclofen for prn muscle spasms with MS - managed by neurologist         2. Constipation - chronic, due to MS.  On polyethylene glycol 6/30/2021     Overview Addendum 5/1/2025 12:22 PM by Carolina Pulliam MD      05/01/2025 A/P:  Continue polyethylene glycol (Miralax) daily; working well.  Also adding more fruit (grapes)           3. HSV (herpes simplex virus) infection 5/1/2025     Overview Addendum 5/1/2025 12:08 PM by Carolina Pulliam MD      05/01/2025 A/P: Is on daily acyclovir with Briumvi infusions.  I did review - doesn't look like needs this but also called to verify with Dr. Redman neurologist before she stops the medicine.         4. Hypertension goal BP (blood pressure) < 140/90 4/3/2024     Overview Addendum 5/1/2025 12:23 PM by Carolina Pulliam MD      05/01/2025 A/P: Stable on losartan 50 mg BID and amlodipine 5 mg daily.    History: stopped hydrochlorothiazide due to hyponatremia         5. MS (multiple sclerosis) (H) - dx'd in 1993, followed by Dr. Redman [facility] 4/12/2013     Overview Addendum 2/19/2025  5:45 PM by Carolina Pulliam MD      02/19/2025 A/P:  Follows with Dr. Redman  Starting disease modifying therapy infusion next week.  A little anxious.  But MS is flaring so needs to treat.  Lots of pain and stiffness.  I  referred for massage therapy at [facility] and also wrote letter so [facility] hopefully covers it.          Relevant Medications     ublituximab-xiiy (BRIUMVI) in sodium chloride 0.9 % 250 mL via CADD pump    6. Neurogenic bladder 1/31/2024     Overview Addendum 5/1/2025 12:24 PM by Carolina Pulliam MD      05/01/2025 A/P: managed by Dr. Redman.  Oxybutynin stopped due to polypharmacy/somnolence concerns.           7. Recurrent UTI 4/3/2024     Overview Addendum 5/1/2025 12:27 PM by Carolina Pulliam MD      05/01/2025 A/P: No daily suppressive therapy after hospitalization.  Plan: if has symptoms in future review ID note from 4/2025 hospitalization and plan for when to treat (rec 3 d course po Cipro only if has symptoms and positive culture)                  MED REC REQUIRED  Post Medication Reconciliation Status: discharge medications reconciled and changed, per note/orders        Subjective Marylee is a 69 year old, presenting for the following health issues:  Hospital F/U (DOS:4/8/2025/[facility]//4/26/2025 discharged from the transitional care )        5/1/2025    11:07 AM   Additional Questions   Roomed by MERLENE Trujillo   Accompanied by Self     HPI Urinary Tract Infection (UTI) Symptoms  - Marylee Woods, 69 years  - Previously experienced symptoms of a urinary tract infection.  - Hospitalized where it was determined there was an infection, but not necessarily a bladder infection.  - Presence of skin cells in urine noted.  - Amoxicillin was stopped due to these findings.    Muscle Spasms  - Experiences spasms in feet, particularly at night.  - Takes baclofen for spasms, which helps but causes sleepiness.  - Uses magnesium oxide, 400 mg, as an additional measure for spasms.    Medication Adjustments  - Hospitalization led to the discontinuation of several medications: gabapentin, amoxicillin, Macrobid, and another unspecified medication.  - Continues to take  baclofen, levothyroxine, acyclovir, losartan, amlodipine, duloxetine, and Pepcid.  - Acyclovir is taken due to immunosuppressive therapy for multiple sclerosis (MS).    Multiple Sclerosis (MS)  - On immunosuppressive therapy for MS.  - Scheduled for next infusion in August.    Bowel Movements  - Experienced frequent bowel movements while hospitalized due to Senna.  - Currently using Miralax daily to regulate bowel movements.    Eye Surgery  - Scheduled for eyelid surgery on September 23, 2025, due to drooping eyelids affecting vision.    Family and Social Context  - Daughter living with her temporarily.  - Engages in physical therapy at home to manage spasms.        4/28/2025   Post Discharge Outreach   How are you doing now that you are home? I'm doing okay   How are your symptoms? (Red Flag symptoms escalate to triage hotline per guidelines) Improved   Does the patient have their discharge instructions?  Yes   Does the patient have questions regarding their discharge instructions?  No   Were you started on any new medications or were there changes to any of your previous medications?  Yes   Does the patient have all of their medications? Yes   Do you have questions regarding any of your medications?  No   Do you have all of your needed medical supplies or equipment (DME)?  (i.e. oxygen tank, CPAP, cane, etc.) Yes   Discharge Follow Up Appointment Scheduled with? Primary Care Provider       Hospital Follow-up Visit:    Hospital/Nursing Home/IP Rehab Facility: [facility]  Most Recent Admission Date: 4/8/2025   Most Recent Admission Diagnosis: Stupor - R40.1     Most Recent Discharge Date: 4/16/2025   Most Recent Discharge Diagnosis: Stupor - R40.1  MS (multiple sclerosis) (H) - G35  Recurrent UTI - N39.0  Urinary tract infection without hematuria, site unspecified - N39.0     Do you have any other stressors you would like to discuss with your provider? No    Problems  taking medications regularly:  None  Medication changes since discharge: yes, many discontinued  Problems adhering to non-medication therapy:  None    Summary of hospitalization:  [facility] hospital discharge summary reviewed  Diagnostic Tests/Treatments reviewed.  Follow up needed: none  Other Healthcare Providers Involved in Patient s Care:         Specialist appointment - neurology  Update since discharge: improved.       Episode of altered cognition  Toxic metabolic encephalopathy   Polypharmacy  Reason for this hospitalization and previous one. IP neurology consulted; felt less likely MS flare. Strong suspicion for medication effect; PTA gabapentin and armodafinil discontinued at hospital discharge.  -follow up with neurology  -monitor mood and behaviors     Acute kidney injury  Noted on hospital admission. Likely contributed to medication effect. DENNISE likely prerenal secondary to poor intake; hyaline casts noted. Ensure supplementation started in hospital. 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 Follows Dr. Redman from [facility]; MRI on 3/21 reportedly stable. In TCU, PRN baclofen added on 4/3 for reported afternoon spasms. Today, no concerns  -baclofen 20 mg qAM and baclofen 30 mg at bedtime; 20 mg daily as needed  -follow-up with neurology as recommended     Essential hypertension  /62- 133/69 mmHg. 81-90 bpm.   -continue losartan 50 mg twice daily  -continue amlodipine 5 mg daily  -follow BP and adjust medications as needed     Right internal carotid artery aneurysm  Incidental finding of right internal carotid artery saccular aneurysm on 4/8; recommend outpatient follow-up.     Genitourinary syndrome of menopause   PTA prophylactic Macrobid discontinued at hospital discharge. Today, patient denies dysuria.    -continue estradiol 2 gram every Mon and Thursday  -monitor for worsening signs/symptoms     MARYLU (obstructive sleep apnea)  IP neurology recommended seeing sleep neurologist to evaluate for sleep-related etiology for head drop spells.  -follow with sleep medicine     Major depressive disorder, remission status unspecified, unspecified whether recurrent  -duloxetine 80 mg daily  -monitor mood and behaviors     Malignant neoplasm of upper-outer quadrant of left breast in female, estrogen receptor positive (H)  Previously on Lemtrada; transitioned Briumvi in Feb 2025; next infusion due in 5 months.   -follow-up with oncology     Other specified hypothyroidism  Last TSH 1.31 (3/26/25)  -levothyroxine 112 mcg daily  -trend TSH periodically     Slow transit constipation   -continue miralax 17 g daily  -adjust bowel meds as needed     Takes dietary supplements   Clarified orders in TCU.  -calcium carbonate 500 mg daily vs 600 mg due to no supply per pharmacy; may return to 500 mg daily at TCU discharge  -take cranberry supplement 200 mg daily while in TCU     Physical deconditioning  Secondary to diagnoses above and recent hospitalization. In TCU for rehabilitation.   -Plan to discharge with home health             HPI:   History of Present Illness-  Urinary Tract Infection (UTI) Symptoms  - Marylee Woods, 69 years  - Previously experienced symptoms of a urinary tract infection.  - Hospitalized where it was determined there was an infection, but not necessarily a bladder infection.  - Presence of skin cells in urine noted.  - Amoxicillin was stopped due to these findings.    Muscle Spasms  - Experiences spasms in feet, particularly at night.  - Takes baclofen for spasms, which helps but causes sleepiness.  - Uses magnesium oxide, 400 mg, as an additional measure for spasms.    Medication Adjustments  - Hospitalization led to the discontinuation of several medications: gabapentin, amoxicillin, Macrobid, and another  "unspecified medication.  - Continues to take baclofen, levothyroxine, acyclovir, losartan, amlodipine, duloxetine, and Pepcid.  - Acyclovir is taken due to immunosuppressive therapy for multiple sclerosis (MS).    Multiple Sclerosis (MS)  - On immunosuppressive therapy for MS.  - Scheduled for next infusion in August.    Bowel Movements  - Experienced frequent bowel movements while hospitalized due to Senna.  - Currently using Miralax daily to regulate bowel movements.    Eye Surgery  - Scheduled for eyelid surgery on September 23, 2025, due to drooping eyelids affecting vision.    Family and Social Context  - Daughter living with her temporarily.  - Engages in physical therapy at home to manage spasms.        Objective    /68 (BP Location: Right arm, Patient Position: Sitting, Cuff Size: Adult Regular)   Pulse 84   Temp 97.4  F (36.3  C) (Temporal)   Resp 20   Ht 1.727 m (5' 8\")   Wt 64.5 kg (142 lb 3.2 oz)   LMP 01/31/2004 (Approximate)   SpO2 100%   BMI 21.62 kg/m    Body mass index is 21.62 kg/m .  Physical Exam   GENERAL: alert and no distress          Signed Electronically by: Carolina Pulliam MD    Answers submitted by the patient for this visit:  Patient Health Questionnaire (Submitted on 5/1/2025)  If you checked off any problems, how difficult have these problems made it for you to do your work, take care of things at home, or get along with other people?: Somewhat difficult  PHQ9 TOTAL SCORE: 4        I spent a total of 41 minutes on the day of the visit.   Time spent by me today doing chart review, history and exam, documentation and further activities per the noteThe longitudinal plan of care for the diagnosis(es)/condition(s) as documented were addressed during this visit. Due to the added complexity in care, I will continue to support Marylee in the subsequent management and with ongoing continuity of care.  "

## 2025-05-01 NOTE — TELEPHONE ENCOUNTER
Home Care is calling regarding an established patient with [facility].  Requesting orders from: Carolina Pulliam  RN APPROVED: RN able to provide verbal orders.  Home Care will send orders for signature.  RN will close encounter.  Is this a request for a temporary pause in the home care episode?  No    Orders Requested  Physical Therapy  Request for initial certification (first set of orders)   Frequency: 1x/week x 1 week, then 2x/week x 2 weeks, then 1x/week x 4 weeks  RN gave verbal order: Yes      Contacts       Contact Date/Time Type Contact Phone/Fax    05/01/2025 02:45 PM CDT Phone (Incoming) CHEIKH Baldwin (Home Care) [phone]          Jes Gupta RN BSN  [facility]

## 2025-05-02 ENCOUNTER — TELEPHONE (OUTPATIENT)
Dept: FAMILY MEDICINE | Facility: CLINIC | Age: 70
End: 2025-05-02
Payer: MEDICARE

## 2025-05-02 NOTE — TELEPHONE ENCOUNTER
Forms/Letter Request    Type of form/letter: Home Health Certification / cert/plan of care 4/29/2025 - 6/27/2025      Do we have the form/letter: Yes: placed in care team 2 providers sign folder    Who is the form from? Home care    Where did/will the form come from? form was faxed in    When is form/letter needed by: n/a    How would you like the form/letter returned: Fax : [phone]

## 2025-05-03 ENCOUNTER — HEALTH MAINTENANCE LETTER (OUTPATIENT)
Age: 70
End: 2025-05-03

## 2025-05-04 DIAGNOSIS — I10 HYPERTENSION GOAL BP (BLOOD PRESSURE) < 140/90: ICD-10-CM

## 2025-05-05 ENCOUNTER — TELEPHONE (OUTPATIENT)
Dept: PHARMACY | Facility: OTHER | Age: 70
End: 2025-05-05
Payer: MEDICARE

## 2025-05-05 RX ORDER — LOSARTAN POTASSIUM 50 MG/1
50 TABLET ORAL 2 TIMES DAILY
Qty: 180 TABLET | Refills: 2 | Status: SHIPPED | OUTPATIENT
Start: 2025-05-05

## 2025-05-05 NOTE — TELEPHONE ENCOUNTER
MTM referral from: Transitions of Care (recent hospital discharge, TCU discharge, or ED visit)    MTM referral outreach attempt #2 on May 5, 2025 at 2:38 PM      Outcome: Spoke with patient declined    Use [facility] TCU Discharge: 4/26/25 for the carrier/Plan on the flowsheet          Ruby Johnson [facility]  -[facility]  [phone]

## 2025-05-06 NOTE — PROGRESS NOTES
[facility] Medical Oncology Note  Date of visit: May 8, 2025  Outpatient Clinic Note    Assessment:     Subcm DCIS status postlumpectomy, in a 66-year-old woman who is on medical disability because of progressive multiple sclerosis.  She is in a wheelchair at baseline.  She has incontinence.  She can ambulate 20-25 steps with the assistance of a walker.  But we have to be careful about any therapy worsening her overall quality of life.  She continues to have multiple hospitalizations yearly due to complications from the MS.  We treat ductal carcinoma in situ to prevent the development of invasive disease.  Adjuvant radiotherapy is standard in this setting.  She received this with only adequate tolerance. In fact, she had significant fatigue months after completing the radiotherapy.  This colors her approach to any other therapies such as endocrine treatment.  Now,  over 3 years from the time of her diagnosis, with no obvious evidence of recurrent disease by history, physical exam or today's mammogram.  Recent hospitalization for encephalopathy thought due to Neurontin.  He really shows the competing causes of morbidity and mortality for medically.  She is off of it now, but is still having the spasm from her multiple sclerosis.  I will prescribe medical marijuana for her today.  I had a long and involved conversation with Malorie Almendarez. Many questions were asked and hopefully answered to her satisfaction.    Plan:       Return to clinic in 12 months for routine follow-up, with the next annual mammogram.  I did certify her for medical marijuana today, to treat the spasms associated with her multiple sclerosis.  The longitudinal plan of care for the diagnosis(es)/condition(s) as documented were addressed during this visit. Due to the added complexity in care, I will continue to support Marylee in the subsequent management and with ongoing continuity of care.     Kenny Martinez MD, MSc    of Medicine  [facility]  [facility]  [address] [place] [address]  [phone]    __________________________________________________________________    DIAGNOSIS     Sub centimeter grade 2 DCIS, diagnosed at left breast lumpectomy 3/31/2022. Final pathology showed a 7 mm area of grade 2 ductal carcinoma in situ.  There was no evidence of invasion in the specimen.  Margins were uninvolved with the closest margin being 4 mm away.  The tumor was estrogen receptor positive.  Multiple Sclerosis, for which she is severely disabled. She spends most of the day in a wheelchair.  She has urinary incontinence due to a neurogenic bladder.  She does do physical therapy once a week.  With assistance from her  Manuel, she can ambulate about 20 feet with a walker.  She then gets tired.  She has issues with spasmatic muscles and is taking antispasmodics.  She has not had any specific drugs for her multiple sclerosis since 2017.  Things have been pretty stable. Recently hospitalized (see below) for acute enxephalopathy die to neurontin.      History of Present Illness/THERAPY TO DATE:     Left lumpectomy 3/31/2022.  Adjuvant RT from 6/1-6/5/2022. (2600 cGy in 5 fractions). This was tolerated very poorly.  Observation in the interim. Endocrine therapy was discussed but the risk/benefit ratio was felt to be not worth prescribing.    Interval History:     Hospitalized 4/8-16/2025 for acute encephalopathy felt due to gabapentin. Imaging negative, though with incidental small R carotid saccular aneurysm requiring monitoring. Had multiple pathogens in urine, but encephalopathy improved prior to initiation of antibiotics. Also found to have a coccyx pressure wound.  Marylee is back for a yearly visit with ayaka Jackson.  Hospitalization above discussed.  Happy to be off of Neurontin.  Feels she is back to her baseline cognitively.  Is open to a prescription for medical marijuana to  treat her spasms which occur daily.  Happy that today's mammogram is negative.  She still has some soreness.  Really does feel she is back to baseline.  Has no cough or shortness of breath.  Her and her  monitor closely for pressure sores   Overall feels comfortable with any adjuvant systemic therapy.        Past Medical History:   I have reviewed this patient's past medical history   Past Medical History:   Diagnosis Date    Acute cystitis without hematuria     Acute pain of right knee     Atypical ductal hyperplasia of left breast 02/2022    Candida esophagitis - 2019 (H)     CKD (chronic kidney disease) stage 3, GFR 30-59 ml/min (H)     Community acquired pneumonia, unspecified laterality     Cough, unspecified type 04/05/2024    Depression     Epigastric pain     Former tobacco use     Fracture of femur, distal, left, closed (H)     Gastro-oesophageal reflux disease     Graves disease     History of fracture of fibula     History of tibial fracture     HSV (herpes simplex virus) infection     Hyperlipidemia with target LDL less than 130     Hyponatremia     Hypoxemia 04/05/2024    L tib fx s/p IM nailing     Lung nodules     Currently managed with follow up imaging by [facility] result Team.       Multiple sclerosis (H)     Osteoporosis     Other specified hypothyroidism 12/12/2024    Postablative hypothyroidism     Stupor 04/08/2025    Urinary tract infection without hematuria, site unspecified 03/26/2025          Past Surgical History:    I have reviewed this patient's past surgical history       Social History:   Tobacco, ETOH, and rec drugs reviewed and as noted below with the following exceptions:  Malorie Almendarez grew up in [place] and graduated from [employer] in 1973.  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  After that she was in the  of another [employer] and she really started thinking that she wanted to do something else with her life.  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  She really liked the work.  She ultimately had to retire because of progression of her multiple sclerosis.  Her  is Manuel whom she met outside of the [place].  [place]  They  in 1985.  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  They have 3 children, Marleni, Joss, and Janeth with one 1-year-old grandson named Sami who lives in [place].  Her bucket list included seeing the [place] in [place] (done) going to [employer] (done).  She is planning next week to go to [employer] and spent a lot of time in the [employer] themed areas since she is a big fan of those books.  She is a [employer]          Family History:     Family History   Problem Relation Age of Onset    Heart Disease Maternal Grandmother     Cancer Maternal Grandfather     Heart Disease Paternal Grandfather     Heart Disease Mother     Heart Disease Father     Diabetes No family hx of     Coronary Artery Disease No family hx of     Hypertension No family hx of     Hyperlipidemia No family hx of     Cerebrovascular Disease No family hx of     Breast Cancer No family hx of     Colon Cancer No family hx of     Prostate Cancer No family hx of     Other Cancer No family hx of     Depression No family hx of     Anxiety Disorder No family hx of     Mental Illness No family hx of     Substance Abuse No family hx of     Anesthesia Reaction No family hx of     Asthma No family hx of     Osteoporosis No family hx of     Genetic Disorder No family hx of     Thyroid Disease No family hx of     Obesity No family hx of     Unknown/Adopted No family hx of             Medications:     Current Outpatient Medications   Medication Sig Dispense Refill    acetaminophen (TYLENOL) 500 MG tablet Take 1-2 tablets (500-1,000 mg) by mouth every 8 hours as needed for mild pain or fever (greater than 101  degrees)      acyclovir (ZOVIRAX) 400 MG tablet TAKE 1 TABLET BY MOUTH EVERY 12 HOURS 180 tablet 2    amLODIPine (NORVASC) 5 MG tablet Take 1 tablet by mouth once daily 90 tablet 2    baclofen (LIORESAL) 10 MG tablet Take 2 tablets (20 mg) in the morning and 3 tablets (30 mg) in the evening. May take one extra 20 mg dose in the afternoon as needed      Calcium Carbonate Antacid 600 MG CHEW Take 600 mg by mouth daily      CRANBERRY PO Take by mouth. Take 1 capsule once daily.      DULoxetine (CYMBALTA) 20 MG capsule TAKE 1 CAPSULE BY MOUTH ONCE DAILY ALONG  WITH  A  60  MG  TABLET  FOR  A  TOTAL  DOSE  OF  80  MG  DAILY 90 capsule 4    DULoxetine (CYMBALTA) 60 MG capsule Take 1 capsule by mouth once daily, along with 20 mg for total of 80 mg daily dose. 90 capsule 4    estradiol (ESTRACE) 0.1 MG/GM vaginal cream Place 1 g vaginally twice a week. 42.5 g 4    famotidine (PEPCID) 40 MG tablet TAKE 1 TABLET BY MOUTH AT BEDTIME 90 tablet 2    latanoprost (XALATAN) 0.005 % ophthalmic solution INSTILL 1 DROP INTO BOTH EYES EVERY DAY AS DIRECTED PT WILL NEED TO BE SEEN FOR FUTURE REFILLS      levothyroxine (SYNTHROID/LEVOTHROID) 112 MCG tablet Take 1 tablet (112 mcg) by mouth daily 90 tablet 3    losartan (COZAAR) 50 MG tablet Take 1 tablet by mouth twice daily 180 tablet 2    magnesium oxide (MAG-OX) 400 MG tablet Take 400 mg by mouth daily.      Multiple Vitamins-Minerals (MULTIPLE VITAMINS/WOMENS PO) Take by mouth daily      ublituximab-xiiy (BRIUMVI) in sodium chloride 0.9 % 250 mL via CADD pump Infuse into the vein. Allow bag to come to room temperature. Do not shake. Continuous rate: 10 mL/hr x30 min, 20 mL/hr x30 min, 35 mL/hr x60 min, 100 mL/hr x120 min. Flush bag with 20 mL NS to flush tubing.                Physical Exam:   Last menstrual period 01/31/2004, not currently breastfeeding.      Constitutional: WDWN female in NAD, pleasant and appropriate.  She has slight slurred speech and was examined in her  wheelchair today.  Head: Full head of hair  Sclera: Anicteric  Neck: Supple, free range of motion, no obvious jugular venous distention   Respirations: Unforced respiratory effort. Clear to auscultation bilaterally  Cardiovascular: S1-S2 regular rate and rhythm  GI: Nontender   Extremities: She has bilateral braces.  No obvious edema proximal of the knee  Neurologic: alert, answering questions appropriately, with some slurred speech.  She does not move her lower extremities since she is in the wheelchair.  Psych: appro not done today due to patient request.  She did have a mammogram.  Data:     Today's mammogram (extensively discussed with Dr. Shaylee Lara):  No sign of malignancy.  Continue yearly screening.           Recent Labs   Lab Test 07/02/21  0727 07/01/21  0713 06/30/21  1717 06/10/21  0904 07/03/20  1525 11/27/18  0933 11/15/18  0711 11/12/18  0622   WBC 9.8 10.4 8.1 5.7 3.6* 3.2*  --  3.2*   NEUTROPHIL 83.8 84.8 77.5  --   --  78.0  --  76.7   HGB 12.6 12.2 14.7 12.2 9.2* 9.6*  --  7.4*    149* 194 177 181 179   < > 204    < > = values in this interval not displayed.     Recent Labs   Lab Test 02/24/22  0830 08/12/21  1033 07/09/21  1538 07/02/21  0727 07/01/21  0713 06/30/21  1717   NA  --  137 136 139 139 136   POTASSIUM  --  3.8 4.5 3.9  3.9 4.4 2.9*   CHLORIDE  --  102 102 104 109 98   CO2  --  34* 34* 25 23 32   ANIONGAP  --  1* <1* 9 7 6   BUN  --  15 9 25 12 13   CR 1.04 1.08* 0.98 0.96 0.88 0.97   JORDON 9.2 9.5 9.5 8.8 8.3* 9.6     Recent Labs   Lab Test 08/12/21  1033 07/01/21  0713 06/30/21  1717 11/02/18  0521 10/03/17  1449   MAG  --  1.8 1.9 1.8  --    PHOS 3.4  --  3.8  --   --    LDH  --   --   --   --  181     Recent Labs   Lab Test 08/12/21  1033 07/02/21  0727 07/01/21  0713 06/30/21  1717 11/01/17  0047 10/03/17  1449   BILITOTAL  --  0.7 0.8 0.7   < > 0.3   ALKPHOS  --  64 58 72   < > 78   ALT  --  19 16 21   < > 21   AST  --  30 33 26   < > 23   ALBUMIN 3.6 3.4 3.0* 4.0   < >  3.9   LDH  --   --   --   --   --  181    < > = values in this interval not displayed.       No results found for this or any previous visit (from the past 24 hours).    Other Data     LEFT breast, 3:00, RFID seed localized lumpectomy:  -Ductal carcinoma in situ (DCIS), nuclear grade 2, size 7 mm, arising in a sclerosed intraductal papilloma  -No invasive carcinoma identified  -Margins are uninvolved by DCIS  -DCIS is 4 mm from the closest (posterior) margin, 5 mm from the lateral margin, and is > 5 mm from the  anterior, superior, inferior, and medial margins  -Other findings: fibrocystic change (including microcysts) and usual ductal hyperplasia  -Calcifications associated with sclerotic stroma and with benign ducts and acini  -Prior core biopsy site change  -DCIS is estrogen receptor positive and progesterone receptor negative by immunohistochemistry (see  biomarker reporting template below)  -See tumor synoptic below  Electronically signed by Juana Palomares MD on 4/7/2022 at 10:45 PM  .  Synoptic Checklist  DCIS OF THE BREAST: Resection (DCIS OF THE BREAST: COMPLETE EXCISION - All Specimens) 8th Edition -  Protocol posted: 2/26/2020  SPECIMEN  Procedure Excision (less than total mastectomy)  Specimen Laterality Left  .      Labs, imaging and treatment plan reviewed with patient. All questions answered.        30 minutes spent on the date of the encounter doing chart review, review of outside records, review of test results, interpretation of tests, patient visit, documentation and discussion with family

## 2025-05-08 ENCOUNTER — ANCILLARY PROCEDURE (OUTPATIENT)
Dept: MAMMOGRAPHY | Facility: CLINIC | Age: 70
End: 2025-05-08
Payer: MEDICARE

## 2025-05-08 ENCOUNTER — ONCOLOGY VISIT (OUTPATIENT)
Dept: ONCOLOGY | Facility: CLINIC | Age: 70
End: 2025-05-08
Attending: INTERNAL MEDICINE
Payer: MEDICARE

## 2025-05-08 VITALS
OXYGEN SATURATION: 100 % | RESPIRATION RATE: 16 BRPM | SYSTOLIC BLOOD PRESSURE: 129 MMHG | HEART RATE: 74 BPM | DIASTOLIC BLOOD PRESSURE: 79 MMHG | TEMPERATURE: 98.1 F

## 2025-05-08 DIAGNOSIS — D05.12 DUCTAL CARCINOMA IN SITU (DCIS) OF LEFT BREAST: ICD-10-CM

## 2025-05-08 DIAGNOSIS — Z12.31 ENCOUNTER FOR SCREENING MAMMOGRAM FOR MALIGNANT NEOPLASM OF BREAST: ICD-10-CM

## 2025-05-08 DIAGNOSIS — G35 MS (MULTIPLE SCLEROSIS) (H): Primary | ICD-10-CM

## 2025-05-08 DIAGNOSIS — Z12.31 BREAST CANCER SCREENING BY MAMMOGRAM: ICD-10-CM

## 2025-05-08 PROCEDURE — 77063 BREAST TOMOSYNTHESIS BI: CPT | Performed by: RADIOLOGY

## 2025-05-08 PROCEDURE — 77067 SCR MAMMO BI INCL CAD: CPT | Performed by: RADIOLOGY

## 2025-05-08 PROCEDURE — G0463 HOSPITAL OUTPT CLINIC VISIT: HCPCS | Performed by: INTERNAL MEDICINE

## 2025-05-08 ASSESSMENT — PAIN SCALES - GENERAL: PAINLEVEL_OUTOF10: NO PAIN (0)

## 2025-05-08 NOTE — NURSING NOTE
"Oncology Rooming Note    May 8, 2025 9:59 AM   Marylee Woods is a 69 year old female who presents for:    Chief Complaint   Patient presents with    Oncology Clinic Visit     Malignant Neoplasm of Left Breast     Initial Vitals: /79 (BP Location: Right arm, Patient Position: Sitting, Cuff Size: Adult Regular)   Pulse 74   Temp 98.1  F (36.7  C) (Oral)   Resp 16   LMP 01/31/2004 (Approximate)   SpO2 100%  Estimated body mass index is 21.62 kg/m  as calculated from the following:    Height as of 5/1/25: 1.727 m (5' 8\").    Weight as of 5/1/25: 64.5 kg (142 lb 3.2 oz). There is no height or weight on file to calculate BSA.  No Pain (0) Comment: Data Unavailable   Patient's last menstrual period was 01/31/2004 (approximate).  Allergies reviewed: Yes  Medications reviewed: Yes    Medications: Medication refills not needed today.  Pharmacy name entered into EPIC:    [facility]  [id number] - [place], [place] - [address] [place] [address] E    Frailty Screening:   Is the patient here for a new oncology consult visit in cancer care? 2. No    PHQ9:  Did this patient require a PHQ9?: No      Clinical concerns: Mammogram results       Anna Garcia LPN  5/8/2025                "

## 2025-05-08 NOTE — LETTER
5/8/2025      Marylee Woods  [address]  [facility] [address]      Dear Colleague,    Thank you for referring your patient, Marylee Woods, to the [facility]. Please see a copy of my visit note below.              [facility] Medical Oncology Note  Date of visit: May 8, 2025  Outpatient Clinic Note    Assessment:     Subcm DCIS status postlumpectomy, in a 66-year-old woman who is on medical disability because of progressive multiple sclerosis.  She is in a wheelchair at baseline.  She has incontinence.  She can ambulate 20-25 steps with the assistance of a walker.  But we have to be careful about any therapy worsening her overall quality of life.  She continues to have multiple hospitalizations yearly due to complications from the MS.  We treat ductal carcinoma in situ to prevent the development of invasive disease.  Adjuvant radiotherapy is standard in this setting.  She received this with only adequate tolerance. In fact, she had significant fatigue months after completing the radiotherapy.  This colors her approach to any other therapies such as endocrine treatment.  Now,  over 3 years from the time of her diagnosis, with no obvious evidence of recurrent disease by history, physical exam or today's mammogram.  Recent hospitalization for encephalopathy thought due to Neurontin.  He really shows the competing causes of morbidity and mortality for medically.  She is off of it now, but is still having the spasm from her multiple sclerosis.  I will prescribe medical marijuana for her today.  I had a long and involved conversation with Malorie Almendarez. Many questions were asked and hopefully answered to her satisfaction.    Plan:       Return to clinic in 12 months for routine follow-up, with the next annual mammogram.  I did certify her for medical marijuana today, to treat the spasms associated with her multiple sclerosis.  The longitudinal plan of care for the  diagnosis(es)/condition(s) as documented were addressed during this visit. Due to the added complexity in care, I will continue to support Marylee in the subsequent management and with ongoing continuity of care.     Kenny Martinez MD, MSc  Associate Professor of Medicine  [employer] [employer]  [facility]  [address] [place] [address]  [phone]    __________________________________________________________________    DIAGNOSIS     Sub centimeter grade 2 DCIS, diagnosed at left breast lumpectomy 3/31/2022. Final pathology showed a 7 mm area of grade 2 ductal carcinoma in situ.  There was no evidence of invasion in the specimen.  Margins were uninvolved with the closest margin being 4 mm away.  The tumor was estrogen receptor positive.  Multiple Sclerosis, for which she is severely disabled. She spends most of the day in a wheelchair.  She has urinary incontinence due to a neurogenic bladder.  She does do physical therapy once a week.  With assistance from her  Manuel, she can ambulate about 20 feet with a walker.  She then gets tired.  She has issues with spasmatic muscles and is taking antispasmodics.  She has not had any specific drugs for her multiple sclerosis since 2017.  Things have been pretty stable. Recently hospitalized (see below) for acute enxephalopathy die to neurontin.      History of Present Illness/THERAPY TO DATE:     Left lumpectomy 3/31/2022.  Adjuvant RT from 6/1-6/5/2022. (2600 cGy in 5 fractions). This was tolerated very poorly.  Observation in the interim. Endocrine therapy was discussed but the risk/benefit ratio was felt to be not worth prescribing.    Interval History:     Hospitalized 4/8-16/2025 for acute encephalopathy felt due to gabapentin. Imaging negative, though with incidental small R carotid saccular aneurysm requiring monitoring. Had multiple pathogens in urine, but encephalopathy improved prior to initiation of antibiotics.  Also found to have a coccyx pressure wound.  Marylee is back for a yearly visit with ayaka Jackson.  Hospitalization above discussed.  Happy to be off of Neurontin.  Feels she is back to her baseline cognitively.  Is open to a prescription for medical marijuana to treat her spasms which occur daily.  Happy that today's mammogram is negative.  She still has some soreness.  Really does feel she is back to baseline.  Has no cough or shortness of breath.  Her and her  monitor closely for pressure sores   Overall feels comfortable with any adjuvant systemic therapy.        Past Medical History:   I have reviewed this patient's past medical history   Past Medical History:   Diagnosis Date     Acute cystitis without hematuria      Acute pain of right knee      Atypical ductal hyperplasia of left breast 02/2022     Candida esophagitis - 2019 (H)      CKD (chronic kidney disease) stage 3, GFR 30-59 ml/min (H)      Community acquired pneumonia, unspecified laterality      Cough, unspecified type 04/05/2024     Depression      Epigastric pain      Former tobacco use      Fracture of femur, distal, left, closed (H)      Gastro-oesophageal reflux disease      Graves disease      History of fracture of fibula      History of tibial fracture      HSV (herpes simplex virus) infection      Hyperlipidemia with target LDL less than 130      Hyponatremia      Hypoxemia 04/05/2024     L tib fx s/p IM nailing      Lung nodules     Currently managed with follow up imaging by [facility] result Team.        Multiple sclerosis (H)      Osteoporosis      Other specified hypothyroidism 12/12/2024     Postablative hypothyroidism      Stupor 04/08/2025     Urinary tract infection without hematuria, site unspecified 03/26/2025          Past Surgical History:    I have reviewed this patient's past surgical history       Social History:   Tobacco, ETOH, and rec drugs reviewed and as noted below with the following  exceptions:  Malorie Almendarez grew up in [place] and graduated from [employer] in 1973.  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  After that she was in the  of another [employer] and she really started thinking that she wanted to do something else with her life.  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  She really liked the work.  She ultimately had to retire because of progression of her multiple sclerosis.  Her  is Manuel whom she met outside of the "[employer] [facility].  They were fans of the rock through the [facility] in those years.  They  in 1985.  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  They have 3 children, Marleni, Joss, and Janeth with one 1-year-old grandson named Sami who lives in [place].  Her bucket list included seeing the [place] in [place] (done) going to [employer] (done).  She is planning next week to go to [employer] and spent a lot of time in the [employer] themed areas since she is a big fan of those books.  She is a [employer]          Family History:     Family History   Problem Relation Age of Onset     Heart Disease Maternal Grandmother      Cancer Maternal Grandfather      Heart Disease Paternal Grandfather      Heart Disease Mother      Heart Disease Father      Diabetes No family hx of      Coronary Artery Disease No family hx of      Hypertension No family hx of      Hyperlipidemia No family hx of      Cerebrovascular Disease No family hx of      Breast Cancer No family hx of      Colon Cancer No family hx of      Prostate Cancer No family hx of      Other Cancer No family hx of      Depression No family hx of      Anxiety Disorder No family hx of      Mental Illness No family hx of      Substance Abuse No family hx of      Anesthesia Reaction No family hx of      Asthma No family hx of      Osteoporosis No family hx of      Genetic Disorder No family hx of      Thyroid  Disease No family hx of      Obesity No family hx of      Unknown/Adopted No family hx of             Medications:     Current Outpatient Medications   Medication Sig Dispense Refill     acetaminophen (TYLENOL) 500 MG tablet Take 1-2 tablets (500-1,000 mg) by mouth every 8 hours as needed for mild pain or fever (greater than 101 degrees)       acyclovir (ZOVIRAX) 400 MG tablet TAKE 1 TABLET BY MOUTH EVERY 12 HOURS 180 tablet 2     amLODIPine (NORVASC) 5 MG tablet Take 1 tablet by mouth once daily 90 tablet 2     baclofen (LIORESAL) 10 MG tablet Take 2 tablets (20 mg) in the morning and 3 tablets (30 mg) in the evening. May take one extra 20 mg dose in the afternoon as needed       Calcium Carbonate Antacid 600 MG CHEW Take 600 mg by mouth daily       CRANBERRY PO Take by mouth. Take 1 capsule once daily.       DULoxetine (CYMBALTA) 20 MG capsule TAKE 1 CAPSULE BY MOUTH ONCE DAILY ALONG  WITH  A  60  MG  TABLET  FOR  A  TOTAL  DOSE  OF  80  MG  DAILY 90 capsule 4     DULoxetine (CYMBALTA) 60 MG capsule Take 1 capsule by mouth once daily, along with 20 mg for total of 80 mg daily dose. 90 capsule 4     estradiol (ESTRACE) 0.1 MG/GM vaginal cream Place 1 g vaginally twice a week. 42.5 g 4     famotidine (PEPCID) 40 MG tablet TAKE 1 TABLET BY MOUTH AT BEDTIME 90 tablet 2     latanoprost (XALATAN) 0.005 % ophthalmic solution INSTILL 1 DROP INTO BOTH EYES EVERY DAY AS DIRECTED PT WILL NEED TO BE SEEN FOR FUTURE REFILLS       levothyroxine (SYNTHROID/LEVOTHROID) 112 MCG tablet Take 1 tablet (112 mcg) by mouth daily 90 tablet 3     losartan (COZAAR) 50 MG tablet Take 1 tablet by mouth twice daily 180 tablet 2     magnesium oxide (MAG-OX) 400 MG tablet Take 400 mg by mouth daily.       Multiple Vitamins-Minerals (MULTIPLE VITAMINS/WOMENS PO) Take by mouth daily       ublituximab-xiiy (BRIUMVI) in sodium chloride 0.9 % 250 mL via CADD pump Infuse into the vein. Allow bag to come to room temperature. Do not shake. Continuous  rate: 10 mL/hr x30 min, 20 mL/hr x30 min, 35 mL/hr x60 min, 100 mL/hr x120 min. Flush bag with 20 mL NS to flush tubing.                Physical Exam:   Last menstrual period 01/31/2004, not currently breastfeeding.      Constitutional: WDWN female in NAD, pleasant and appropriate.  She has slight slurred speech and was examined in her wheelchair today.  Head: Full head of hair  Sclera: Anicteric  Neck: Supple, free range of motion, no obvious jugular venous distention   Respirations: Unforced respiratory effort. Clear to auscultation bilaterally  Cardiovascular: S1-S2 regular rate and rhythm  GI: Nontender   Extremities: She has bilateral braces.  No obvious edema proximal of the knee  Neurologic: alert, answering questions appropriately, with some slurred speech.  She does not move her lower extremities since she is in the wheelchair.  Psych: appro not done today due to patient request.  She did have a mammogram.  Data:     Today's mammogram (extensively discussed with Dr. Shaylee Lara):  No sign of malignancy.  Continue yearly screening.           Recent Labs   Lab Test 07/02/21  0727 07/01/21  0713 06/30/21  1717 06/10/21  0904 07/03/20  1525 11/27/18  0933 11/15/18  0711 11/12/18  0622   WBC 9.8 10.4 8.1 5.7 3.6* 3.2*  --  3.2*   NEUTROPHIL 83.8 84.8 77.5  --   --  78.0  --  76.7   HGB 12.6 12.2 14.7 12.2 9.2* 9.6*  --  7.4*    149* 194 177 181 179   < > 204    < > = values in this interval not displayed.     Recent Labs   Lab Test 02/24/22  0830 08/12/21  1033 07/09/21  1538 07/02/21  0727 07/01/21  0713 06/30/21  1717   NA  --  137 136 139 139 136   POTASSIUM  --  3.8 4.5 3.9  3.9 4.4 2.9*   CHLORIDE  --  102 102 104 109 98   CO2  --  34* 34* 25 23 32   ANIONGAP  --  1* <1* 9 7 6   BUN  --  15 9 25 12 13   CR 1.04 1.08* 0.98 0.96 0.88 0.97   JORDON 9.2 9.5 9.5 8.8 8.3* 9.6     Recent Labs   Lab Test 08/12/21  1033 07/01/21  0713 06/30/21  1717 11/02/18  0521 10/03/17  1449   MAG  --  1.8 1.9 1.8  --     PHOS 3.4  --  3.8  --   --    LDH  --   --   --   --  181     Recent Labs   Lab Test 08/12/21  1033 07/02/21  0727 07/01/21  0713 06/30/21  1717 11/01/17  0047 10/03/17  1449   BILITOTAL  --  0.7 0.8 0.7   < > 0.3   ALKPHOS  --  64 58 72   < > 78   ALT  --  19 16 21   < > 21   AST  --  30 33 26   < > 23   ALBUMIN 3.6 3.4 3.0* 4.0   < > 3.9   LDH  --   --   --   --   --  181    < > = values in this interval not displayed.       No results found for this or any previous visit (from the past 24 hours).    Other Data     LEFT breast, 3:00, RFID seed localized lumpectomy:  -Ductal carcinoma in situ (DCIS), nuclear grade 2, size 7 mm, arising in a sclerosed intraductal papilloma  -No invasive carcinoma identified  -Margins are uninvolved by DCIS  -DCIS is 4 mm from the closest (posterior) margin, 5 mm from the lateral margin, and is > 5 mm from the  anterior, superior, inferior, and medial margins  -Other findings: fibrocystic change (including microcysts) and usual ductal hyperplasia  -Calcifications associated with sclerotic stroma and with benign ducts and acini  -Prior core biopsy site change  -DCIS is estrogen receptor positive and progesterone receptor negative by immunohistochemistry (see  biomarker reporting template below)  -See tumor synoptic below  Electronically signed by Juana Palomares MD on 4/7/2022 at 10:45 PM  .  Synoptic Checklist  DCIS OF THE BREAST: Resection (DCIS OF THE BREAST: COMPLETE EXCISION - All Specimens) 8th Edition -  Protocol posted: 2/26/2020  SPECIMEN  Procedure Excision (less than total mastectomy)  Specimen Laterality Left  .      Labs, imaging and treatment plan reviewed with patient. All questions answered.        30 minutes spent on the date of the encounter doing chart review, review of outside records, review of test results, interpretation of tests, patient visit, documentation and discussion with family         Again, thank you for allowing me to participate in the care  of your patient.        Sincerely,        Kenny Martinez MD    Electronically signed

## 2025-05-12 ENCOUNTER — TELEPHONE (OUTPATIENT)
Dept: FAMILY MEDICINE | Facility: CLINIC | Age: 70
End: 2025-05-12
Payer: MEDICARE

## 2025-05-12 NOTE — TELEPHONE ENCOUNTER
Forms/Letter Request    Type of form/letter: Home Health Certification      Do we have the form/letter: Yes: CT 2    Who is the form from? Home care    Where did/will the form come from? form was faxed in    When is form/letter needed by: ASAP    How would you like the form/letter returned: Fax : [facility] [phone]

## 2025-05-14 DIAGNOSIS — Z53.9 DIAGNOSIS NOT YET DEFINED: Primary | ICD-10-CM

## 2025-05-14 PROCEDURE — G0180 MD CERTIFICATION HHA PATIENT: HCPCS | Performed by: FAMILY MEDICINE

## 2025-05-14 NOTE — TELEPHONE ENCOUNTER
Faxed completed home health cert forms to [facility] [phone], faxed stat and kept in clinic

## 2025-05-15 DIAGNOSIS — E89.0 POSTABLATIVE HYPOTHYROIDISM: ICD-10-CM

## 2025-05-16 NOTE — TELEPHONE ENCOUNTER
Last Written Prescription:  levothyroxine (SYNTHROID/LEVOTHROID) 112 MCG tablet  112 mcg, DAILY           Summary: Take 1 tablet (112 mcg) by mouth daily, Disp-90 tablet, R-3, E-Prescribe  Dose, Route, Frequency: 112 mcg, Oral, DAILYStart: 06/15/2024Ordered On: 06/15/2024Pharmacy: [id number] - [place], [place] - [address] EReportDx Associated: Taking: Long-term: Med Note:                Directions: Take 1 tablet (112 mcg) by mouth daily  Ordering Department: [facility]  Authorized By: Estela Flowers MD  Dispense: 90 tablet  Refills: 3 ordered       ----------------------  Last Visit Date: 12/02/2024 Office Visit ROSAMARIA Patel   Future Visit Date: 12/15/25  ----------------------      Refill decision: Medication refilled per  Medication Refill in Ambulatory Care  policy.   []  If no future appointment scheduled: Route to Clinic Coordinators to contact the pt for appointment.      Refill decision: Medication unable to be refilled by RN due to:  Abnormal labs/test:    Last TSH was 4.45 on 4/8/25, Was in ED/hospitalized. Per discharge note: - Further management of levothyroxine needed, TSH was elevated at 4.45 on admission 4/8   Provider please review    Request from pharmacy:  Requested Prescriptions   Pending Prescriptions Disp Refills    levothyroxine (SYNTHROID/LEVOTHROID) 112 MCG tablet [Pharmacy Med Name: Levothyroxine Sodium 112 MCG Oral Tablet] 90 tablet 0     Sig: Take 1 tablet by mouth once daily       Thyroid Protocol Failed - 5/16/2025  4:01 PM        Failed - Normal TSH on file in past 12 months     Recent Labs   Lab Test 04/08/25  0928   TSH 4.45*              Passed - Patient is 12 years or older        Passed - Medication is active on med list and the sig matches. RN to manually verify dose and sig if red X/fail.     If the protocol passes (green check), you do not need to verify med dose and sig.    A prescription matches if they are the same clinical intention.    For  Example: once daily and every morning are the same.    The protocol can not identify upper and lower case letters as matching and will fail.     For Example: Take 1 tablet (50 mg) by mouth daily     TAKE 1 TABLET (50 MG) BY MOUTH DAILY    For all fails (red x), verify dose and sig.    If the refill does match what is on file, the RN can still proceed to approve the refill request.       If they do not match, route to the appropriate provider.             Passed - Recent (12 mo) or future (90 days) visit within the authorizing provider's specialty     The patient must have completed an in-person or virtual visit within the past 12 months or has a future visit scheduled within the next 90 days with the authorizing provider s specialty.  Urgent care and e-visits do not qualify as an office visit for this protocol.          Passed - Medication indicated for associated diagnosis     Medication is associated with one or more of the following diagnoses:  Hypothyroidism  Thyroid stimulating hormone suppression therapy  Thyroid cancer  Acquired atrophy of thyroid          Passed - No active pregnancy on record     If patient is pregnant or has had a positive pregnancy test, please check TSH.          Passed - No positive pregnancy test in past 12 months     If patient is pregnant or has had a positive pregnancy test, please check TSH.

## 2025-05-17 RX ORDER — LEVOTHYROXINE SODIUM 112 UG/1
112 TABLET ORAL DAILY
Qty: 90 TABLET | Refills: 2 | Status: SHIPPED | OUTPATIENT
Start: 2025-05-17

## 2025-05-28 ENCOUNTER — PATIENT OUTREACH (OUTPATIENT)
Dept: CARE COORDINATION | Facility: CLINIC | Age: 70
End: 2025-05-28
Payer: MEDICARE

## 2025-05-28 NOTE — PROGRESS NOTES
Clinic Care Coordination Contact  [facility]/Voicemail    Clinical Data: Care Coordinator Outreach    Outreach Documentation Number of Outreach Attempt   5/28/2025  11:18 AM 1       Left message on patient's voicemail with call back information and requested return call.      Plan: Care Coordinator will try to reach patient again in 10 business days.    HUYEN John  Social Work Care Coordinator  [facility]  [email or web address]  Office: [phone]  Gender pronouns: she/her

## 2025-06-02 DIAGNOSIS — K21.9 GASTROESOPHAGEAL REFLUX DISEASE WITHOUT ESOPHAGITIS: ICD-10-CM

## 2025-06-02 DIAGNOSIS — B00.9 HSV (HERPES SIMPLEX VIRUS) INFECTION: ICD-10-CM

## 2025-06-02 RX ORDER — ACYCLOVIR 400 MG/1
400 TABLET ORAL EVERY 12 HOURS
Qty: 180 TABLET | Refills: 2 | Status: SHIPPED | OUTPATIENT
Start: 2025-06-02

## 2025-06-02 RX ORDER — FAMOTIDINE 40 MG/1
40 TABLET, FILM COATED ORAL AT BEDTIME
Qty: 90 TABLET | Refills: 2 | Status: SHIPPED | OUTPATIENT
Start: 2025-06-02

## 2025-06-12 ENCOUNTER — PATIENT OUTREACH (OUTPATIENT)
Dept: CARE COORDINATION | Facility: CLINIC | Age: 70
End: 2025-06-12
Payer: MEDICARE

## 2025-06-12 NOTE — LETTER
[facility] C[facility] COORDINATION  [address]  [place] [address]    June 12, 2025    Marylee Woods  [address]  [place] [address]      Dear Marylee,    I have been attempting to reach you since our last contact. I would like to continue to work with you and provide any additional support you may need on achieving your health care related goals. I would appreciate if you would give me a call at [phone] to let me know if you would like to continue working together. I know that there are many things that can affect our ability to communicate and I hope we can continue to work together.    We are focused on providing you with the highest-quality healthcare experience possible.    Sincerely,    Mayra Brown [facility]  Social Work Care Coordinator  [facility]  [email or web address]  Office: [phone]  Gender pronouns: she/her

## 2025-06-12 NOTE — LETTER
Dear Marylee,       Juan Jose is an updated Patient Centered Plan of Care for your continued enrollment in Care Coordination. Please let us know if you have additional questions, concerns or goals that we can assist with.     Sincerely,     HUYEN John  Social Work Care Coordinator  [facility]  [email or web address]  Office: [phone]  Gender pronouns: she/her    [facility]  Patient Centered Plan of Care  About Me:        Patient Name:  Marylee Woods    YOB: 1955  Age:         70 year old   Brock MRN:    [id number] Telephone Information:  Home Phone [phone]   Mobile [phone]       Address:  [address] [place] [id number] Email address:  [email or web address]      Emergency Contact(s)    Name Relationship Lgl Grd Work Phone Home Phone Mobile Phone   1. ANOOP SHARMA Spouse No   [phone]   2. NATALIYA SHARMA Son No   [phone]   3. YOMAIRA PROCTOR* Sister No   [phone]           Primary language:  English     needed? No   [place] [employer]:  [phone] op. 1  Other communication barriers:None    Preferred Method of Communication:  Mail  Current living arrangement: I live in a private home with family    Mobility Status/ Medical Equipment: No data recorded      Health Maintenance  Health Maintenance Reviewed: Up to date      My Access Plan  Medical Emergency 911   Primary Clinic Line [facility] [phone]   24 Hour Appointment Line [phone] or  [phone] ([phone]) (toll-free)   24 Hour Nurse Line [phone] (toll-free)   Preferred Urgent Care No data recorded   Preferred Hospital No data recorded   Preferred Pharmacy [facility]     Behavioral Health Crisis Line The National Suicide Prevention Lifeline at 1-691.429.2769 or Text/Call 188           My Care Team Members  Patient Care Team         Relationship Specialty Notifications  Start End    Carolina Pulliam MD PCP - General [facility] Medicine  3/17/23     Phone: [phone] Fax: [phone]         [address] [facility] [address] [place] [address]    Jose Valadez MD MD Orthopedics  1/9/19     Phone: [phone] Fax: [phone]         [address] [place] [address]    Amanda Liu MD MD Surgery Admissions 3/9/22     Phone: [phone] Fax: [phone]         [address] [place] [address]    Maru Tipton NP Referring Physician Nurse Practitioner - Family Admissions 3/9/22     Refer to surg onc    Phone: [phone] Fax: [phone]         [address] [facility] [address] [place] [address]    Rod Cintron MD MD Critical Care  3/27/23     Phone: [phone] Fax: [phone]         [address] [place] [address]    Reny Spence PA-C Physician Assistant Physician Assistant - Medical  3/27/23     Phone: [phone] Fax: [phone]         [address] [facility] [address] [place] [address]    Carolina Pulliam MD Assigned PCP   4/1/23     Phone: [phone] Fax: [phone]         [address]  [place] [address]    Radha Dias, RN Specialty Care Coordinator Hematology & Oncology Admissions 8/17/23     Kenny Martinez MD Assigned Cancer Care Provider   11/18/23     Phone: [phone] Fax: [phone]         [address] [facility] [address]    Jero Em MD Assigned Musculoskeletal Provider   12/16/23     Phone: [phone] Fax: [phone]         [address] [facility] [address]    Estela Flowers MD MD Endocrinology, Diabetes, and Metabolism  4/15/24     Phone: [phone] Fax: [phone]         [address] [facility] [address]    Neli Bean MD Assigned Endocrinology Provider   12/23/24     Phone: [phone] Fax: [phone] [address] [facility] [address]    Mayra Brown LICSW Lead Care Coordinator  Admissions 2/24/25                  My Care Plans  Self Management and Treatment Plan    Care Plan  Care Plan: Mental Health       Problem: Mental Health Symptoms Need Improvement       Goal: Improve management of mental health symptoms and establish with mental health/psychosocial supports       Start Date: 2/24/2025 Expected End Date: 5/25/2025    Recent Progress: 10%    Priority: High    Note:     Barriers: Time and finding someone patient feels comfortable with  Strengths: Willing to accept Care Coordination and motivated  Patient expressed understanding of goal: Yes  Action steps to achieve this goal:  My provider will make a referral for Mental Health .   I will receive a call within 2 business days from Mental Health .  I will call Mental Health  if I don't hear anything. Phone number is: [phone]  I will work with Mental Health  on finding a therapist.  I will have a therapy session and discuss my mental health concerns.  I will reach out to Care Coordinator with any questions or concerns.     4/28/25: Recently discharged from hospital/TCU. Patient is still needing to schedule a therapy appointment.                                 Action Plans on File:            Depression          Advance Care Plans/Directives:   Advanced Care Plan/Directives on file: No    Discussed with patient/caregiver(s): No data recorded             My Medical and Care Information  Problem List   Patient Active Problem List   Diagnosis    Anemia of chronic disease    MS (multiple sclerosis) (H) - dx'd in 1993, followed by Dr. Redman [facility]    Major depression in complete remission (H)    Constipation - chronic, due to MS.  On polyethylene glycol    Postablative hypothyroidism - which ensued post radioiodine ablation for Graves with 9.3 mCi I-131, on 2/9/2021.    Multiple thyroid nodules - with no ultrasound features suggestive of malignancy 2021    Osteoporosis, severe based  on history of femoral fractures after falling from a standing height, in 2009 and 2018 and after a wheelchair accident in 11/2023.    Ductal carcinoma in situ (DCIS) of left breast    Treatment-emergent central sleep apnea and Hypersomnia - followed by Dr Say Lopez 2024    Optic neuropathy    Spasticity    Neurogenic bladder    Ataxic gait    Hypertension goal BP (blood pressure) < 140/90    Recurrent UTI    Malignant neoplasm of upper-outer quadrant of left breast in female, estrogen receptor positive (H)    Class 1 obesity with serious comorbidity and body mass index (BMI) of 30.0 to 30.9 in adult, unspecified obesity type    Acute pyelonephritis    Acute colitis    Confusion    HSV (herpes simplex virus) infection    At risk for polypharmacy causing somnloence      Current Medications:  Please refer to the most recent medication list provided to you by your medical team and reach out to your provider with any questions or to make any corrections.    Care Coordination Start Date: 2/19/2025   Frequency of Care Coordination: monthly, more frequently as needed     Form Last Updated: 06/12/2025

## 2025-06-12 NOTE — PROGRESS NOTES
Clinic Care Coordination Contact  [facility]/Voicemail    Clinical Data: Care Coordinator Outreach    Outreach Documentation Number of Outreach Attempt   5/28/2025  11:18 AM 1   6/12/2025  10:08 AM 2       Left message on patient's voicemail with call back information and requested return call.      Plan: Care Coordinator will send unable to contact letter with care coordinator contact information via Webymaster. Care Coordinator will try to reach patient again in 10 business days.    Mayra Brown [facility]  Social Work Care Coordinator  [facility]  [email or web address]  Office: [phone]  Gender pronouns: she/her

## 2025-06-30 ENCOUNTER — PATIENT OUTREACH (OUTPATIENT)
Dept: CARE COORDINATION | Facility: CLINIC | Age: 70
End: 2025-06-30
Payer: MEDICARE

## 2025-06-30 NOTE — LETTER
[facility] CARE COORDINATION  [address] [facility] [address]  [place] [address]    June 30, 2025    Marylee Woods  [address]  [facility] [address]      Dear Marylee,    I have been unsuccessful in reaching you since our last contact. At this time the Care Coordination team will make no further attempts to reach you, however this does not change your ability to continue receiving care from your providers at your primary care clinic. If you need additional support from a care coordinator in the future please contact Mayra Brown at [phone].    We are focused on providing you with the highest-quality healthcare experience possible.    Sincerely,    Mayra Brown, [facility]  Social Work Care Coordinator  [facility]  [email or web address]  Office: [phone]  Gender pronouns: she/her

## 2025-06-30 NOTE — PROGRESS NOTES
Clinic Care Coordination Contact  [facility]/Voicemail    Clinical Data: Care Coordinator Outreach    Outreach Documentation Number of Outreach Attempt   5/28/2025  11:18 AM 1   6/12/2025  10:08 AM 2   6/30/2025   1:15 PM 3       Left message on patient's voicemail with call back information and requested return call.      Plan: Care Coordinator will send disenrollment letter with care coordinator contact information via MyChart and mail. Care Coordinator will do no further outreaches at this time.    Mayra Brown [facility]  Social Work Care Coordinator  [facility]  [email or web address]  Office: [phone]  Gender pronouns: she/her

## 2025-06-30 NOTE — LETTER
[facility] CARE COORDINATION  [address] [facility] [address]  [place] [address]    June 30, 2025    Marylee Woods  [address]  [facility] [address]      Dear Marylee,    I have been unsuccessful in reaching you since our last contact. At this time the Care Coordination team will make no further attempts to reach you, however this does not change your ability to continue receiving care from your providers at your primary care clinic. If you need additional support from a care coordinator in the future please contact Mayra Brown at [phone].    We are focused on providing you with the highest-quality healthcare experience possible.    Sincerely,    Mayra Brown, [facility]  Social Work Care Coordinator  [facility]  [email or web address]  Office: [phone]  Gender pronouns: she/her

## 2025-08-12 ENCOUNTER — TELEPHONE (OUTPATIENT)
Dept: FAMILY MEDICINE | Facility: CLINIC | Age: 70
End: 2025-08-12
Payer: MEDICARE

## 2025-08-12 ENCOUNTER — LAB (OUTPATIENT)
Dept: LAB | Facility: CLINIC | Age: 70
End: 2025-08-12
Payer: MEDICARE

## 2025-08-12 DIAGNOSIS — Z79.899 POLYPHARMACY: ICD-10-CM

## 2025-08-12 DIAGNOSIS — Z87.440 HISTORY OF RECURRENT UTIS: ICD-10-CM

## 2025-08-12 DIAGNOSIS — Z87.440 HISTORY OF RECURRENT UTIS: Primary | ICD-10-CM

## 2025-08-12 LAB
ALBUMIN UR-MCNC: NEGATIVE MG/DL
APPEARANCE UR: CLEAR
BACTERIA #/AREA URNS HPF: ABNORMAL /HPF
BILIRUB UR QL STRIP: NEGATIVE
COLOR UR AUTO: YELLOW
GLUCOSE UR STRIP-MCNC: NEGATIVE MG/DL
HGB UR QL STRIP: ABNORMAL
KETONES UR STRIP-MCNC: NEGATIVE MG/DL
LEUKOCYTE ESTERASE UR QL STRIP: ABNORMAL
NITRATE UR QL: NEGATIVE
PH UR STRIP: 7 [PH] (ref 5–7)
RBC #/AREA URNS AUTO: ABNORMAL /HPF
SP GR UR STRIP: 1.01 (ref 1–1.03)
SQUAMOUS #/AREA URNS AUTO: ABNORMAL /LPF
UROBILINOGEN UR STRIP-ACNC: 0.2 E.U./DL
WBC #/AREA URNS AUTO: ABNORMAL /HPF
WBC CLUMPS #/AREA URNS HPF: PRESENT /HPF

## 2025-08-12 PROCEDURE — 81001 URINALYSIS AUTO W/SCOPE: CPT

## 2025-08-12 PROCEDURE — 87086 URINE CULTURE/COLONY COUNT: CPT

## 2025-08-13 LAB — BACTERIA UR CULT: NORMAL

## (undated) DEVICE — SU MONOCRYL 4-0 P-3 18" UND Y494G

## (undated) DEVICE — SOL WATER IRRIG 500ML BOTTLE 2F7113

## (undated) DEVICE — GLOVE PROTEXIS W/NEU-THERA 7.5  2D73TE75

## (undated) DEVICE — CLIP HORIZON SM RED WIDE SLOT 001201

## (undated) DEVICE — LOCALIZER INSTRUMENT COVER KIT

## (undated) DEVICE — ENDO FORCEP ENDOJAW BIOPSY 2.8MMX230CM FB-220U

## (undated) DEVICE — SOL WATER IRRIG 1000ML BOTTLE 2F7114

## (undated) DEVICE — SOL ISOPROPYL RUBBING ALCOHOL USP 70% 4OZ HDX-20 I0020

## (undated) DEVICE — NDL 15GA 1.5" 8881200029

## (undated) DEVICE — NDL 30GA 0.5" 305106

## (undated) DEVICE — DRAPE U SPLIT 74X120" 29440

## (undated) DEVICE — LINEN GOWN X4 5410

## (undated) DEVICE — BASIN EMESIS STERILE  SSK9005A

## (undated) DEVICE — LOCALIZER SURGICAL PROBE

## (undated) DEVICE — SYR 05ML LL W/O NDL

## (undated) DEVICE — GLOVE PROTEXIS BLUE W/NEU-THERA 6.0  2D73EB60

## (undated) DEVICE — SU VICRYL 3-0 SH 27" UND J416H

## (undated) DEVICE — BASIN SET MAJOR

## (undated) DEVICE — BLADE KNIFE SURG 15 371115

## (undated) DEVICE — SYR 10ML FINGER CONTROL W/O NDL 309695

## (undated) DEVICE — SPONGE LAP 18X18" X8435

## (undated) DEVICE — SU VICRYL 2-0 CT-1 27" UND J259H

## (undated) DEVICE — PREP CHLORAPREP 26ML TINTED ORANGE  260815

## (undated) DEVICE — CAST PADDING 6" STERILE 9046S

## (undated) DEVICE — SU MONOCRYL 3-0 PS-2 18" UND Y497G

## (undated) DEVICE — ESU ELEC BLADE 2.75" COATED/INSULATED E1455

## (undated) DEVICE — DRSG PRIMAPORE 03 1/8X6" 66000318

## (undated) DEVICE — SU VICRYL 0 CT-1 CR 8X18" J740D

## (undated) DEVICE — CLIP HORIZON MED BLUE 002200

## (undated) DEVICE — PACK MINOR CUSTOM ASC

## (undated) DEVICE — GUIDE WIRE 2.5X200MM 310.243

## (undated) DEVICE — MARKER MARGIN MARKER STD 6 COLOR SGL USE MMS6

## (undated) DEVICE — STRAP KNEE/BODY 31143004

## (undated) DEVICE — CATH TRAY FOLEY SURESTEP 16FR WDRAIN BAG STLK LATEX A300316A

## (undated) DEVICE — Device

## (undated) DEVICE — ESU GROUND PAD ADULT W/CORD E7507

## (undated) DEVICE — SUCTION MANIFOLD DORNOCH ULTRA CART UL-CL500

## (undated) DEVICE — DRAPE STERI INCISE 1050

## (undated) DEVICE — GLOVE PROTEXIS BLUE W/NEU-THERA 8.0  2D73EB80

## (undated) DEVICE — DRAPE C-ARM W/STRAPS 42X72" 07-CA104

## (undated) DEVICE — ENDO FORCEP ENDOJAW BIOPSY 2.8MMX160CM FB-220K

## (undated) DEVICE — DRSG PRIMAPORE 02X3" 7133

## (undated) DEVICE — GOWN IMPERVIOUS SPECIALTY XLG/XLONG 32474

## (undated) DEVICE — PEN MARKING SKIN W/LABELS 31145918

## (undated) DEVICE — DRSG KERLIX FLUFFS X5

## (undated) DEVICE — BLADE SAW SAGITTAL STRK 25X79.5X1.24MM 4/2000 2108-318-000

## (undated) DEVICE — WIPES FOLEY CARE SURESTEP PROVON DFC100

## (undated) DEVICE — DRSG STERI STRIP 1/2X4" R1547

## (undated) DEVICE — GLOVE PROTEXIS MICRO 5.5  2D73PM55

## (undated) DEVICE — CAST PADDING 4" STERILE 9044S

## (undated) DEVICE — DRAPE POUCH INSTRUMENT 1018

## (undated) DEVICE — ESU PENCIL SMOKE EVAC W/ROCKER SWITCH 0703-047-000

## (undated) DEVICE — DRILL BIT SYN 4.3X180MM  310.431

## (undated) DEVICE — DRAPE U-DRAPE 1015NSD NON-STERILE

## (undated) DEVICE — ESU PENCIL W/HOLSTER E2350H

## (undated) DEVICE — LINEN TOWEL PACK X5 5464

## (undated) DEVICE — DRAPE C-ARMOR 5 SIDED 5523

## (undated) DEVICE — LINEN BACK PACK 5440

## (undated) DEVICE — KIT ENDO TURNOVER/PROCEDURE CARRY-ON 101822

## (undated) DEVICE — SU DERMABOND ADVANCED .7ML DNX12

## (undated) DEVICE — SPECIMEN CONTAINER 5OZ STERILE 2600SA

## (undated) DEVICE — BNDG ELASTIC 6" DBL LENGTH UNSTERILE 6611-16

## (undated) DEVICE — DRAPE CONVERTORS U-DRAPE 60X72" 8476

## (undated) DEVICE — DRSG ABDOMINAL 07 1/2X8" 7197D

## (undated) DEVICE — ESU GROUND PAD UNIVERSAL W/O CORD

## (undated) DEVICE — SOL NACL 0.9% IRRIG 1000ML BOTTLE 2F7124

## (undated) DEVICE — SOL NACL 0.9% IRRIG 500ML BOTTLE 2F7123

## (undated) RX ORDER — FENTANYL CITRATE 50 UG/ML
INJECTION, SOLUTION INTRAMUSCULAR; INTRAVENOUS
Status: DISPENSED
Start: 2022-03-31

## (undated) RX ORDER — BUPIVACAINE HYDROCHLORIDE 2.5 MG/ML
INJECTION, SOLUTION EPIDURAL; INFILTRATION; INTRACAUDAL
Status: DISPENSED
Start: 2018-11-01

## (undated) RX ORDER — HYDROMORPHONE HYDROCHLORIDE 1 MG/ML
INJECTION, SOLUTION INTRAMUSCULAR; INTRAVENOUS; SUBCUTANEOUS
Status: DISPENSED
Start: 2018-11-01

## (undated) RX ORDER — ONDANSETRON 2 MG/ML
INJECTION INTRAMUSCULAR; INTRAVENOUS
Status: DISPENSED
Start: 2022-03-31

## (undated) RX ORDER — ACETAMINOPHEN 325 MG/1
TABLET ORAL
Status: DISPENSED
Start: 2022-03-31

## (undated) RX ORDER — LIDOCAINE HYDROCHLORIDE 20 MG/ML
INJECTION, SOLUTION EPIDURAL; INFILTRATION; INTRACAUDAL; PERINEURAL
Status: DISPENSED
Start: 2022-03-31

## (undated) RX ORDER — PROPOFOL 10 MG/ML
INJECTION, EMULSION INTRAVENOUS
Status: DISPENSED
Start: 2022-03-31

## (undated) RX ORDER — CEFAZOLIN SODIUM 2 G/50ML
SOLUTION INTRAVENOUS
Status: DISPENSED
Start: 2022-03-31

## (undated) RX ORDER — FENTANYL CITRATE 50 UG/ML
INJECTION, SOLUTION INTRAMUSCULAR; INTRAVENOUS
Status: DISPENSED
Start: 2018-11-01

## (undated) RX ORDER — FENTANYL CITRATE 50 UG/ML
INJECTION, SOLUTION INTRAMUSCULAR; INTRAVENOUS
Status: DISPENSED
Start: 2017-01-26

## (undated) RX ORDER — EPINEPHRINE 1 MG/ML
INJECTION, SOLUTION INTRAMUSCULAR; SUBCUTANEOUS
Status: DISPENSED
Start: 2022-03-31

## (undated) RX ORDER — CEFAZOLIN SODIUM 2 G/100ML
INJECTION, SOLUTION INTRAVENOUS
Status: DISPENSED
Start: 2018-11-01

## (undated) RX ORDER — DEXAMETHASONE SODIUM PHOSPHATE 4 MG/ML
INJECTION, SOLUTION INTRA-ARTICULAR; INTRALESIONAL; INTRAMUSCULAR; INTRAVENOUS; SOFT TISSUE
Status: DISPENSED
Start: 2022-03-31

## (undated) RX ORDER — PROPOFOL 10 MG/ML
INJECTION, EMULSION INTRAVENOUS
Status: DISPENSED
Start: 2018-11-01

## (undated) RX ORDER — LIDOCAINE HYDROCHLORIDE 20 MG/ML
INJECTION, SOLUTION EPIDURAL; INFILTRATION; INTRACAUDAL; PERINEURAL
Status: DISPENSED
Start: 2018-11-01

## (undated) RX ORDER — LIDOCAINE HYDROCHLORIDE AND EPINEPHRINE 10; 10 MG/ML; UG/ML
INJECTION, SOLUTION INFILTRATION; PERINEURAL
Status: DISPENSED
Start: 2022-03-31

## (undated) RX ORDER — FENTANYL CITRATE 50 UG/ML
INJECTION, SOLUTION INTRAMUSCULAR; INTRAVENOUS
Status: DISPENSED
Start: 2023-04-20

## (undated) RX ORDER — ONDANSETRON 2 MG/ML
INJECTION INTRAMUSCULAR; INTRAVENOUS
Status: DISPENSED
Start: 2018-11-01

## (undated) RX ORDER — OXYCODONE HYDROCHLORIDE 5 MG/1
TABLET ORAL
Status: DISPENSED
Start: 2022-03-31